# Patient Record
Sex: MALE | Race: WHITE | NOT HISPANIC OR LATINO | URBAN - METROPOLITAN AREA
[De-identification: names, ages, dates, MRNs, and addresses within clinical notes are randomized per-mention and may not be internally consistent; named-entity substitution may affect disease eponyms.]

---

## 2017-09-06 ENCOUNTER — INPATIENT (INPATIENT)
Facility: HOSPITAL | Age: 72
LOS: 5 days | Discharge: ANOTHER HOSPITAL | DRG: 280 | End: 2017-09-12
Attending: INTERNAL MEDICINE | Admitting: INTERNAL MEDICINE
Payer: MEDICARE

## 2017-09-06 VITALS
RESPIRATION RATE: 18 BRPM | TEMPERATURE: 98 F | HEIGHT: 68 IN | DIASTOLIC BLOOD PRESSURE: 100 MMHG | WEIGHT: 190.04 LBS | OXYGEN SATURATION: 98 % | HEART RATE: 74 BPM | SYSTOLIC BLOOD PRESSURE: 207 MMHG

## 2017-09-06 DIAGNOSIS — I21.4 NON-ST ELEVATION (NSTEMI) MYOCARDIAL INFARCTION: ICD-10-CM

## 2017-09-06 DIAGNOSIS — I16.1 HYPERTENSIVE EMERGENCY: ICD-10-CM

## 2017-09-06 DIAGNOSIS — Z29.9 ENCOUNTER FOR PROPHYLACTIC MEASURES, UNSPECIFIED: ICD-10-CM

## 2017-09-06 DIAGNOSIS — N20.0 CALCULUS OF KIDNEY: ICD-10-CM

## 2017-09-06 DIAGNOSIS — E11.22 TYPE 2 DIABETES MELLITUS WITH DIABETIC CHRONIC KIDNEY DISEASE: ICD-10-CM

## 2017-09-06 DIAGNOSIS — N17.9 ACUTE KIDNEY FAILURE, UNSPECIFIED: ICD-10-CM

## 2017-09-06 DIAGNOSIS — N12 TUBULO-INTERSTITIAL NEPHRITIS, NOT SPECIFIED AS ACUTE OR CHRONIC: ICD-10-CM

## 2017-09-06 LAB
ALBUMIN SERPL ELPH-MCNC: 3.7 G/DL — SIGNIFICANT CHANGE UP (ref 3.5–5)
ALP SERPL-CCNC: 83 U/L — SIGNIFICANT CHANGE UP (ref 40–120)
ALT FLD-CCNC: 19 U/L DA — SIGNIFICANT CHANGE UP (ref 10–60)
ANION GAP SERPL CALC-SCNC: 8 MMOL/L — SIGNIFICANT CHANGE UP (ref 5–17)
APPEARANCE UR: ABNORMAL
APTT BLD: 35.9 SEC — SIGNIFICANT CHANGE UP (ref 27.5–37.4)
AST SERPL-CCNC: 15 U/L — SIGNIFICANT CHANGE UP (ref 10–40)
BASOPHILS # BLD AUTO: 0 K/UL — SIGNIFICANT CHANGE UP (ref 0–0.2)
BASOPHILS NFR BLD AUTO: 0.3 % — SIGNIFICANT CHANGE UP (ref 0–2)
BILIRUB SERPL-MCNC: 0.4 MG/DL — SIGNIFICANT CHANGE UP (ref 0.2–1.2)
BILIRUB UR-MCNC: NEGATIVE — SIGNIFICANT CHANGE UP
BUN SERPL-MCNC: 35 MG/DL — HIGH (ref 7–18)
CALCIUM SERPL-MCNC: 10 MG/DL — SIGNIFICANT CHANGE UP (ref 8.4–10.5)
CHLORIDE SERPL-SCNC: 107 MMOL/L — SIGNIFICANT CHANGE UP (ref 96–108)
CO2 SERPL-SCNC: 25 MMOL/L — SIGNIFICANT CHANGE UP (ref 22–31)
COLOR SPEC: ABNORMAL
CREAT SERPL-MCNC: 3.55 MG/DL — HIGH (ref 0.5–1.3)
DIFF PNL FLD: ABNORMAL
EOSINOPHIL # BLD AUTO: 0 K/UL — SIGNIFICANT CHANGE UP (ref 0–0.5)
EOSINOPHIL NFR BLD AUTO: 0 % — SIGNIFICANT CHANGE UP (ref 0–6)
GLUCOSE SERPL-MCNC: 189 MG/DL — HIGH (ref 70–99)
GLUCOSE UR QL: 50 MG/DL
HCT VFR BLD CALC: 39.8 % — SIGNIFICANT CHANGE UP (ref 39–50)
HGB BLD-MCNC: 12.9 G/DL — LOW (ref 13–17)
INR BLD: 1.13 RATIO — SIGNIFICANT CHANGE UP (ref 0.88–1.16)
KETONES UR-MCNC: ABNORMAL
LEUKOCYTE ESTERASE UR-ACNC: ABNORMAL
LYMPHOCYTES # BLD AUTO: 1 K/UL — SIGNIFICANT CHANGE UP (ref 1–3.3)
LYMPHOCYTES # BLD AUTO: 7.7 % — LOW (ref 13–44)
MCHC RBC-ENTMCNC: 28.2 PG — SIGNIFICANT CHANGE UP (ref 27–34)
MCHC RBC-ENTMCNC: 32.5 GM/DL — SIGNIFICANT CHANGE UP (ref 32–36)
MCV RBC AUTO: 86.9 FL — SIGNIFICANT CHANGE UP (ref 80–100)
MONOCYTES # BLD AUTO: 0.4 K/UL — SIGNIFICANT CHANGE UP (ref 0–0.9)
MONOCYTES NFR BLD AUTO: 3.3 % — SIGNIFICANT CHANGE UP (ref 2–14)
NEUTROPHILS # BLD AUTO: 11.9 K/UL — HIGH (ref 1.8–7.4)
NEUTROPHILS NFR BLD AUTO: 88.7 % — HIGH (ref 43–77)
NITRITE UR-MCNC: POSITIVE
PH UR: 5 — SIGNIFICANT CHANGE UP (ref 5–8)
PLATELET # BLD AUTO: 229 K/UL — SIGNIFICANT CHANGE UP (ref 150–400)
POTASSIUM SERPL-MCNC: 5.1 MMOL/L — SIGNIFICANT CHANGE UP (ref 3.5–5.3)
POTASSIUM SERPL-SCNC: 5.1 MMOL/L — SIGNIFICANT CHANGE UP (ref 3.5–5.3)
PROT SERPL-MCNC: 8 G/DL — SIGNIFICANT CHANGE UP (ref 6–8.3)
PROT UR-MCNC: 500 MG/DL
PROTHROM AB SERPL-ACNC: 12.3 SEC — SIGNIFICANT CHANGE UP (ref 9.8–12.7)
RBC # BLD: 4.58 M/UL — SIGNIFICANT CHANGE UP (ref 4.2–5.8)
RBC # FLD: 13.1 % — SIGNIFICANT CHANGE UP (ref 10.3–14.5)
SODIUM SERPL-SCNC: 140 MMOL/L — SIGNIFICANT CHANGE UP (ref 135–145)
SP GR SPEC: 1.01 — SIGNIFICANT CHANGE UP (ref 1.01–1.02)
TROPONIN I SERPL-MCNC: 0.19 NG/ML — HIGH (ref 0–0.04)
UROBILINOGEN FLD QL: NEGATIVE — SIGNIFICANT CHANGE UP
WBC # BLD: 13.5 K/UL — HIGH (ref 3.8–10.5)
WBC # FLD AUTO: 13.5 K/UL — HIGH (ref 3.8–10.5)

## 2017-09-06 PROCEDURE — 71010: CPT | Mod: 26

## 2017-09-06 PROCEDURE — 99222 1ST HOSP IP/OBS MODERATE 55: CPT

## 2017-09-06 PROCEDURE — 74176 CT ABD & PELVIS W/O CONTRAST: CPT | Mod: 26

## 2017-09-06 PROCEDURE — 99285 EMERGENCY DEPT VISIT HI MDM: CPT

## 2017-09-06 PROCEDURE — 76770 US EXAM ABDO BACK WALL COMP: CPT | Mod: 26

## 2017-09-06 PROCEDURE — 76775 US EXAM ABDO BACK WALL LIM: CPT | Mod: 26

## 2017-09-06 RX ORDER — METOPROLOL TARTRATE 50 MG
50 TABLET ORAL
Qty: 0 | Refills: 0 | Status: DISCONTINUED | OUTPATIENT
Start: 2017-09-06 | End: 2017-09-07

## 2017-09-06 RX ORDER — ASPIRIN/CALCIUM CARB/MAGNESIUM 324 MG
81 TABLET ORAL DAILY
Qty: 0 | Refills: 0 | Status: DISCONTINUED | OUTPATIENT
Start: 2017-09-06 | End: 2017-09-12

## 2017-09-06 RX ORDER — LABETALOL HCL 100 MG
10 TABLET ORAL ONCE
Qty: 0 | Refills: 0 | Status: COMPLETED | OUTPATIENT
Start: 2017-09-06 | End: 2017-09-06

## 2017-09-06 RX ORDER — DEXTROSE 50 % IN WATER 50 %
12.5 SYRINGE (ML) INTRAVENOUS ONCE
Qty: 0 | Refills: 0 | Status: DISCONTINUED | OUTPATIENT
Start: 2017-09-06 | End: 2017-09-07

## 2017-09-06 RX ORDER — HYDROMORPHONE HYDROCHLORIDE 2 MG/ML
0.5 INJECTION INTRAMUSCULAR; INTRAVENOUS; SUBCUTANEOUS
Qty: 0 | Refills: 0 | Status: DISCONTINUED | OUTPATIENT
Start: 2017-09-06 | End: 2017-09-10

## 2017-09-06 RX ORDER — ASPIRIN/CALCIUM CARB/MAGNESIUM 324 MG
325 TABLET ORAL ONCE
Qty: 0 | Refills: 0 | Status: DISCONTINUED | OUTPATIENT
Start: 2017-09-06 | End: 2017-09-06

## 2017-09-06 RX ORDER — CEFTRIAXONE 500 MG/1
1 INJECTION, POWDER, FOR SOLUTION INTRAMUSCULAR; INTRAVENOUS EVERY 24 HOURS
Qty: 0 | Refills: 0 | Status: DISCONTINUED | OUTPATIENT
Start: 2017-09-06 | End: 2017-09-12

## 2017-09-06 RX ORDER — HEPARIN SODIUM 5000 [USP'U]/ML
INJECTION INTRAVENOUS; SUBCUTANEOUS
Qty: 25000 | Refills: 0 | Status: DISCONTINUED | OUTPATIENT
Start: 2017-09-06 | End: 2017-09-07

## 2017-09-06 RX ORDER — HEPARIN SODIUM 5000 [USP'U]/ML
4000 INJECTION INTRAVENOUS; SUBCUTANEOUS EVERY 6 HOURS
Qty: 0 | Refills: 0 | Status: DISCONTINUED | OUTPATIENT
Start: 2017-09-06 | End: 2017-09-06

## 2017-09-06 RX ORDER — ACETAMINOPHEN WITH CODEINE 300MG-30MG
1 TABLET ORAL EVERY 4 HOURS
Qty: 0 | Refills: 0 | Status: DISCONTINUED | OUTPATIENT
Start: 2017-09-06 | End: 2017-09-06

## 2017-09-06 RX ORDER — HEPARIN SODIUM 5000 [USP'U]/ML
4000 INJECTION INTRAVENOUS; SUBCUTANEOUS ONCE
Qty: 0 | Refills: 0 | Status: COMPLETED | OUTPATIENT
Start: 2017-09-06 | End: 2017-09-06

## 2017-09-06 RX ORDER — PANTOPRAZOLE SODIUM 20 MG/1
40 TABLET, DELAYED RELEASE ORAL
Qty: 0 | Refills: 0 | Status: DISCONTINUED | OUTPATIENT
Start: 2017-09-06 | End: 2017-09-11

## 2017-09-06 RX ORDER — ATORVASTATIN CALCIUM 80 MG/1
80 TABLET, FILM COATED ORAL AT BEDTIME
Qty: 0 | Refills: 0 | Status: DISCONTINUED | OUTPATIENT
Start: 2017-09-06 | End: 2017-09-12

## 2017-09-06 RX ORDER — SODIUM CHLORIDE 9 MG/ML
1000 INJECTION INTRAMUSCULAR; INTRAVENOUS; SUBCUTANEOUS ONCE
Qty: 0 | Refills: 0 | Status: COMPLETED | OUTPATIENT
Start: 2017-09-06 | End: 2017-09-06

## 2017-09-06 RX ORDER — ONDANSETRON 8 MG/1
4 TABLET, FILM COATED ORAL ONCE
Qty: 0 | Refills: 0 | Status: COMPLETED | OUTPATIENT
Start: 2017-09-06 | End: 2017-09-06

## 2017-09-06 RX ORDER — NITROGLYCERIN 6.5 MG
10 CAPSULE, EXTENDED RELEASE ORAL
Qty: 50 | Refills: 0 | Status: DISCONTINUED | OUTPATIENT
Start: 2017-09-06 | End: 2017-09-06

## 2017-09-06 RX ORDER — CLOPIDOGREL BISULFATE 75 MG/1
75 TABLET, FILM COATED ORAL DAILY
Qty: 0 | Refills: 0 | Status: DISCONTINUED | OUTPATIENT
Start: 2017-09-06 | End: 2017-09-10

## 2017-09-06 RX ORDER — ACETAMINOPHEN WITH CODEINE 300MG-30MG
1 TABLET ORAL ONCE
Qty: 0 | Refills: 0 | Status: DISCONTINUED | OUTPATIENT
Start: 2017-09-06 | End: 2017-09-06

## 2017-09-06 RX ORDER — METOPROLOL TARTRATE 50 MG
5 TABLET ORAL ONCE
Qty: 0 | Refills: 0 | Status: DISCONTINUED | OUTPATIENT
Start: 2017-09-06 | End: 2017-09-06

## 2017-09-06 RX ORDER — INSULIN LISPRO 100/ML
VIAL (ML) SUBCUTANEOUS
Qty: 0 | Refills: 0 | Status: DISCONTINUED | OUTPATIENT
Start: 2017-09-06 | End: 2017-09-10

## 2017-09-06 RX ORDER — GLUCAGON INJECTION, SOLUTION 0.5 MG/.1ML
1 INJECTION, SOLUTION SUBCUTANEOUS ONCE
Qty: 0 | Refills: 0 | Status: DISCONTINUED | OUTPATIENT
Start: 2017-09-06 | End: 2017-09-07

## 2017-09-06 RX ORDER — DEXTROSE 50 % IN WATER 50 %
25 SYRINGE (ML) INTRAVENOUS ONCE
Qty: 0 | Refills: 0 | Status: DISCONTINUED | OUTPATIENT
Start: 2017-09-06 | End: 2017-09-07

## 2017-09-06 RX ORDER — CLOPIDOGREL BISULFATE 75 MG/1
300 TABLET, FILM COATED ORAL ONCE
Qty: 0 | Refills: 0 | Status: COMPLETED | OUTPATIENT
Start: 2017-09-06 | End: 2017-09-06

## 2017-09-06 RX ORDER — BUDESONIDE AND FORMOTEROL FUMARATE DIHYDRATE 160; 4.5 UG/1; UG/1
2 AEROSOL RESPIRATORY (INHALATION)
Qty: 0 | Refills: 0 | Status: DISCONTINUED | OUTPATIENT
Start: 2017-09-06 | End: 2017-09-12

## 2017-09-06 RX ORDER — NITROGLYCERIN 6.5 MG
0.4 CAPSULE, EXTENDED RELEASE ORAL ONCE
Qty: 0 | Refills: 0 | Status: DISCONTINUED | OUTPATIENT
Start: 2017-09-06 | End: 2017-09-06

## 2017-09-06 RX ORDER — DEXTROSE 50 % IN WATER 50 %
1 SYRINGE (ML) INTRAVENOUS ONCE
Qty: 0 | Refills: 0 | Status: DISCONTINUED | OUTPATIENT
Start: 2017-09-06 | End: 2017-09-07

## 2017-09-06 RX ORDER — NIFEDIPINE 30 MG
30 TABLET, EXTENDED RELEASE 24 HR ORAL DAILY
Qty: 0 | Refills: 0 | Status: DISCONTINUED | OUTPATIENT
Start: 2017-09-07 | End: 2017-09-08

## 2017-09-06 RX ORDER — TAMSULOSIN HYDROCHLORIDE 0.4 MG/1
0.4 CAPSULE ORAL AT BEDTIME
Qty: 0 | Refills: 0 | Status: DISCONTINUED | OUTPATIENT
Start: 2017-09-06 | End: 2017-09-06

## 2017-09-06 RX ORDER — ATORVASTATIN CALCIUM 80 MG/1
40 TABLET, FILM COATED ORAL AT BEDTIME
Qty: 0 | Refills: 0 | Status: DISCONTINUED | OUTPATIENT
Start: 2017-09-06 | End: 2017-09-06

## 2017-09-06 RX ORDER — SODIUM CHLORIDE 9 MG/ML
1000 INJECTION, SOLUTION INTRAVENOUS
Qty: 0 | Refills: 0 | Status: DISCONTINUED | OUTPATIENT
Start: 2017-09-06 | End: 2017-09-07

## 2017-09-06 RX ORDER — TAMSULOSIN HYDROCHLORIDE 0.4 MG/1
0.4 CAPSULE ORAL AT BEDTIME
Qty: 0 | Refills: 0 | Status: DISCONTINUED | OUTPATIENT
Start: 2017-09-06 | End: 2017-09-12

## 2017-09-06 RX ORDER — METOPROLOL TARTRATE 50 MG
25 TABLET ORAL
Qty: 0 | Refills: 0 | Status: DISCONTINUED | OUTPATIENT
Start: 2017-09-06 | End: 2017-09-06

## 2017-09-06 RX ORDER — ASPIRIN/CALCIUM CARB/MAGNESIUM 324 MG
325 TABLET ORAL ONCE
Qty: 0 | Refills: 0 | Status: COMPLETED | OUTPATIENT
Start: 2017-09-06 | End: 2017-09-06

## 2017-09-06 RX ORDER — NITROGLYCERIN 6.5 MG
0.4 CAPSULE, EXTENDED RELEASE ORAL ONCE
Qty: 0 | Refills: 0 | Status: COMPLETED | OUTPATIENT
Start: 2017-09-06 | End: 2017-09-06

## 2017-09-06 RX ORDER — RANOLAZINE 500 MG/1
1000 TABLET, FILM COATED, EXTENDED RELEASE ORAL
Qty: 0 | Refills: 0 | Status: DISCONTINUED | OUTPATIENT
Start: 2017-09-06 | End: 2017-09-06

## 2017-09-06 RX ADMIN — CLOPIDOGREL BISULFATE 300 MILLIGRAM(S): 75 TABLET, FILM COATED ORAL at 18:24

## 2017-09-06 RX ADMIN — HEPARIN SODIUM 4000 UNIT(S): 5000 INJECTION INTRAVENOUS; SUBCUTANEOUS at 18:34

## 2017-09-06 RX ADMIN — Medication 1 TABLET(S): at 16:52

## 2017-09-06 RX ADMIN — CEFTRIAXONE 100 GRAM(S): 500 INJECTION, POWDER, FOR SOLUTION INTRAMUSCULAR; INTRAVENOUS at 20:38

## 2017-09-06 RX ADMIN — Medication 325 MILLIGRAM(S): at 21:07

## 2017-09-06 RX ADMIN — Medication 1 TABLET(S): at 17:10

## 2017-09-06 RX ADMIN — Medication 10 MILLIGRAM(S): at 19:15

## 2017-09-06 RX ADMIN — ONDANSETRON 4 MILLIGRAM(S): 8 TABLET, FILM COATED ORAL at 16:52

## 2017-09-06 RX ADMIN — SODIUM CHLORIDE 2000 MILLILITER(S): 9 INJECTION INTRAMUSCULAR; INTRAVENOUS; SUBCUTANEOUS at 16:53

## 2017-09-06 RX ADMIN — TAMSULOSIN HYDROCHLORIDE 0.4 MILLIGRAM(S): 0.4 CAPSULE ORAL at 22:33

## 2017-09-06 RX ADMIN — Medication 50 MILLIGRAM(S): at 20:38

## 2017-09-06 RX ADMIN — BUDESONIDE AND FORMOTEROL FUMARATE DIHYDRATE 2 PUFF(S): 160; 4.5 AEROSOL RESPIRATORY (INHALATION) at 23:03

## 2017-09-06 RX ADMIN — HEPARIN SODIUM 1000 UNIT(S)/HR: 5000 INJECTION INTRAVENOUS; SUBCUTANEOUS at 18:35

## 2017-09-06 RX ADMIN — ATORVASTATIN CALCIUM 80 MILLIGRAM(S): 80 TABLET, FILM COATED ORAL at 22:34

## 2017-09-06 NOTE — H&P ADULT - PROBLEM SELECTOR PLAN 3
LIN on CKD, last creatinine 2.1 in 2016  CT abdomen/pelvis: right hydronephrosis, right ureteral stone; bilateral renal calculi, left renal atrophy.  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  patient refused james  - check urine Na, creatinine, calculate FENA  - urology consult tonight LIN on CKD, last creatinine 2.1 in 2016  CT abdomen/pelvis: right hydronephrosis, right ureteral stone; bilateral renal calculi, left renal atrophy.  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  patient refused james  - check urine Na, creatinine, calculate FENA  - urology consult tonight - Dr Irvin consulted

## 2017-09-06 NOTE — ED PROVIDER NOTE - OBJECTIVE STATEMENT
71 y/o M pt with a significant PMHx of coronary atherosclerosis, lumbar radiculopathy, HTN, DM, HLD, Bipolar 1 disorder, cholecystitis, hydronephrosis, dizziness, cardiac arrhythmia, fainting, COPD, CKD, acute renal failure and anemia presents to the ED c/o intermittent R flank pain becoming severe today associated with nausea and NBNB vomiting. Pt is Austrian speaking with daughter as ; pt refuses translation phone services. 73 y/o M pt with a significant PMHx of coronary atherosclerosis, lumbar radiculopathy, HTN, DM, HLD, Bipolar 1 disorder, cholecystitis, hydronephrosis, dizziness, cardiac arrhythmia, fainting, COPD, CKD, acute renal failure and anemia presents to the ED c/o intermittent R flank pain becoming severe today associated with nausea and NBNB vomiting. Pt is Finnish speaking with daughter as ; pt refuses translation phone services. Pt was sent in by his pmd for uncontrollable pain despite taking Vimovo and inability to tolerate PO. Pt notes he has had multiple CT and US demonstrating numerous kidney stones. Pt is intermittently adherent with Flomax. Pt also complains of subjective fever x today. Pt refused IV medication at the ED and requests tylenol number 3. Pt also refuses urinary catheter but will agree to IV line, labs and an attempt to control pain and nausea. Pt denies diarrhea or any other complaints. NKDA. 71 y/o M pt with a significant PMHx of coronary atherosclerosis, lumbar radiculopathy, HTN, DM, HLD, Bipolar 1 disorder, cholecystitis, hydronephrosis, dizziness, cardiac arrhythmia, fainting, COPD, CKD, acute renal failure and anemia presents to the ED c/o intermittent R flank pain becoming severe today associated with nausea and NBNB vomiting. Pt is [place] speaking with daughter as ; pt refuses translation phone services. Pt was sent in by his pPMD for uncontrollable pain despite taking Vimovo and inability to tolerate PO. Pt notes he has had multiple CT and US demonstrating numerous kidney.  Requests Tylenol #3 for pain, refuses IV Tylenol, Percocet, or Morphine. Pt also refuses urinary catheter but will agree to IV line, labs and an attempt to control pain and nausea. Pt denies diarrhea or any other complaints. NKDA.

## 2017-09-06 NOTE — H&P ADULT - ASSESSMENT
71yo male, Nauruan speaking, wife is current patient in ICU, daughter at bedside to [facility], [facility] CAD s/p 12-14 stents (placed [phone]), DM, HTN, HLD, CVA (4 yrs ago, residual left sided facial weakness), lumbar radiculopathy with herniated disc, COPD (never smoker, passive smoke exposure), CKD (last creatinine 2 months ago was 1.2), bipolar disorder (no meds, sees psychiatrist), chronic kidney stones (has had ureteral stent in past) p/w 2 days intractable right flank pain radiating to groin, chills, vomiting 2/2 kidney stones/UTI, rule out pyelonephritis/obstruction.  Patient with NSTEMI with unstable angina and TWI lateral leads

## 2017-09-06 NOTE — H&P ADULT - PROBLEM SELECTOR PLAN 4
fever, chills, vomiting, perinephric stranding on CT scan, + R CVAT  UA positive nitrite/LE/WBC  - ceftriaxone  - follow up UCx and Bcx

## 2017-09-06 NOTE — H&P ADULT - HISTORY OF PRESENT ILLNESS
73yo male, Albanian speaking, wife is current patient in ICU, daughter at bedside to [facility], [facility] CAD s/p 12-14 stents (placed [id number]), DM, HTN, HLD, CVA (4 yrs ago, residual left sided facial weakness), lumbar radiculopathy with herniated disc, COPD (never smoker, passive smoke exposure), CKD (last creatinine 2 months ago was 1.2), bipolar disorder (no meds, sees psychiatrist), chronic kidney stones (has had ureteral stent in past) p/w 2 days intractable right flank pain radiating to groin, chills, vomiting.  BIBA.  In ED, endorsed 10/10 substernal chest tightness with radiation to jaw.  ROS + chest pain at rest, CHAMPION after 2 steps, lower abdominal pain.  Denies fever, orthopnea, leg swelling, sore throat, diarrhea, dysuria, hematuria.  Last cardiac cath 2months ago, showed stent migration and stenosis, but patient refused stent.  Patient refusing james    In ED,  patient comfortable in stretcher  tmax 99 P 74 /100 RR 18 02 sat 98% RA  /77 after labetolol 10 IV and lopressor 5mg IV  s/p , plavix 300, NTG 0.4 SL  UA + WBC and RBC  Cr 3.55, renal ultrasound

## 2017-09-06 NOTE — ED ADULT TRIAGE NOTE - CHIEF COMPLAINT QUOTE
via ambulance ambulatory w/ daughter sent from pmd office for Rt flank pains this morning w/ nausea and vomiting

## 2017-09-06 NOTE — ED PROVIDER NOTE - CONSTITUTIONAL, MLM
normal... Well appearing, well nourished, awake, alert, oriented to person, place, time/situation and in no acute distress.

## 2017-09-06 NOTE — ED ADULT NURSE NOTE - OBJECTIVE STATEMENT
Pt co having right flank pain, HX kidney stones. co nausea and vomiting today. denies diarrhea and trouble urination

## 2017-09-06 NOTE — ED PROVIDER NOTE - MUSCULOSKELETAL, MLM
Spine appears normal, range of motion is not limited, no muscle or joint tenderness. R CVA tenderness.

## 2017-09-06 NOTE — ED PROVIDER NOTE - NS_ ATTENDINGSCRIBEDETAILS _ED_A_ED_FT
The scribe's documentation has been prepared under my direction and personally reviewed by me, Saskia Win MD, in its entirety. I confirm that the note above accurately reflects all work, treatment, procedures, and medical decision making performed by me.

## 2017-09-06 NOTE — ED PROVIDER NOTE - MEDICAL DECISION MAKING DETAILS
71 y/o M pt presents with R flank pain likely 2/2 recurrent kidney stones. Will give IV fluids, Zofran, labs, tylenol number 3 and consider Toradol when creatine is back, US and reassess. 71 y/o M pt presents with R flank pain likely 2/2 recurrent kidney stones. Will give IV fluids, Zofran, labs, tylenol number 3 and consider Toradol when creatine is back, US and reassess.    ADDENDUM by Kenia Mcneil MD: Please see  clinical event note for further clinical course.

## 2017-09-06 NOTE — H&P ADULT - PROBLEM SELECTOR PLAN 1
patient presented with unstable patient presented with unstable angina in ED  TWI precordial and lateral leads, elevated troponin  currently, chest pain free after ASA, plavix loading, sl NTG, heparin gtt  no beds in CCU and no cath tonight given naman on ckd  - trend troponins  - TTE in AM  - ASA, statin, BB, heparin gtt  - cardiology consult in AM

## 2017-09-06 NOTE — H&P ADULT - ATTENDING COMMENTS
72 male with hx of CAD s/p prior PCI, CKD, HTN, nephrolithiasis, ureteral stent, CVA, COPD, presented to ED complaining of right flank pain along with chest pain.  Found to have kidney stones, mild hydronephrosis, LIN, UTI.  Also with NSTEMI, EKG abnormalities (inverted T-waves in precordial leads).  Transfer center contacted however they currently have no CCU beds, and they do not want to cath given the LIN.    Will admit to ICU, start ASA, plavix, heparin drip, nitro drip, abx for UTI, metoprolol, statin.  Will check echo, troponins, obtain cardiology consult in AM.  CT abdomen/pelvis ordered.

## 2017-09-06 NOTE — ED PROVIDER NOTE - PMH
Acute renal failure    Anemia    Bipolar 1 disorder    Cardiac arrhythmia    Cholecystitis    CKD (chronic kidney disease)    COPD (chronic obstructive pulmonary disease)    Coronary atherosclerosis    Dizziness    DM (diabetes mellitus)    Fainting    HLD (hyperlipidemia)    HTN (hypertension)    Hydronephrosis    Left heart failure    Lumbar radiculopathy    Reflux esophagitis

## 2017-09-06 NOTE — ED PROVIDER NOTE - CARE PLAN
Principal Discharge DX:	NSTEMI (non-ST elevated myocardial infarction) Principal Discharge DX:	NSTEMI (non-ST elevated myocardial infarction)  Secondary Diagnosis:	Renal stone

## 2017-09-06 NOTE — CONSULT NOTE ADULT - SUBJECTIVE AND OBJECTIVE BOX
HPI:  73yo male, Tanzanian speaking, wife is current patient in ICU, daughter at bedside to [facility], [facility] CAD s/p 12-14 stents (placed [phone]), DM, HTN, HLD, CVA (4 yrs ago, residual left sided facial weakness), lumbar radiculopathy with herniated disc, COPD (never smoker, passive smoke exposure), CKD (last creatinine 2 months ago was 1.2), bipolar disorder (no meds, sees psychiatrist), chronic kidney stones (has had ureteral stent in past) p/w 2 days intractable right flank pain radiating to groin, chills, vomiting.  BIBA.  In ED, endorsed 10/10 substernal chest tightness with radiation to jaw.  ROS + chest pain at rest, CHAMPION after 2 steps, lower abdominal pain.  Denies fever, orthopnea, leg swelling, sore throat, diarrhea, dysuria, hematuria.  Last cardiac cath 2months ago, showed stent migration and stenosis, but patient refused stent.  Patient refusing james    In ED,  patient comfortable in stretcher  tmax 99 P 74 /100 RR 18 02 sat 98% RA  /77 after labetolol 10 IV and lopressor 5mg IV  s/p , plavix 300, NTG 0.4 SL  UA + WBC and RBC  Cr 3.55, renal ultrasound (06 Sep 2017 20:37)      PAST MEDICAL & SURGICAL HISTORY:      Vital Signs Last 24 Hrs  T(C): 37.6 (06 Sep 2017 20:59), Max: 37.6 (06 Sep 2017 20:59)  T(F): 99.6 (06 Sep 2017 20:59), Max: 99.6 (06 Sep 2017 20:59)  HR: 83 (06 Sep 2017 21:45) (74 - 99)  BP: 160/76 (06 Sep 2017 21:45) (159/78 - 207/100)  BP(mean): 97 (06 Sep 2017 21:45) (97 - 104)  RR: 17 (06 Sep 2017 21:45) (17 - 25)  SpO2: 99% (06 Sep 2017 21:45) (97% - 99%)                          12.9   13.5  )-----------( 229      ( 06 Sep 2017 16:27 )             39.8     09-06    140  |  107  |  35<H>  ----------------------------<  189<H>  5.1   |  25  |  3.55<H>    Ca    10.0      06 Sep 2017 16:27    TPro  8.0  /  Alb  3.7  /  TBili  0.4  /  DBili  x   /  AST  15  /  ALT  19  /  AlkPhos  83  09-06    PT/INR - ( 06 Sep 2017 16:27 )   PT: 12.3 sec;   INR: 1.13 ratio         PTT - ( 06 Sep 2017 16:27 )  PTT:35.9 sec      < from: US Renal (09.06.17 @ 16:50) >  Right kidney: Normal size, measures 12.0 x 6.1 x 6.2 cm. There is a 1.0   cm shadowing calculus in the lower pole. Mild hydronephrosis. No   perinephric fluid collection.    Left kidney: Normal size, measures 10.2 x 4.3 x 4.8 cm. Multiple   shadowing calculi measuring up to 1.2 cm. No hydronephrosis or   perinephric fluid collection. There is a cyst in the upper pole measuring   1.6 cm.    Urinary bladder contains a 1.9 cm shadowing calculus. Urinary bladder is   incompletely distended, which limits evaluation.    < end of copied text >    PHYSICAL EXAM  resting comfortably  Abdomen: soft, mild suprapubic tenderness    ASSESSMENT/ PLAN:  71 y/o male admitted to ICU with Acute on CRF, NSTEMI, renal calculi with mild R hydro  on hep gtt per ICU  pt/family refusing james  iv abx per ICU; trend wbc, chem  needs med/cardiac clearance; w/u  urology will follow  case discussed with Dr Irvin

## 2017-09-06 NOTE — ED PROVIDER NOTE - PROGRESS NOTE DETAILS
NIC HOWARD  ATTENDING, MD: During sign out, as per RN, patient c/o chest pain and EKG was obtained.   EKG-signed and handed to Dr. Mcneil at sign out.

## 2017-09-07 DIAGNOSIS — N20.0 CALCULUS OF KIDNEY: ICD-10-CM

## 2017-09-07 DIAGNOSIS — N18.9 CHRONIC KIDNEY DISEASE, UNSPECIFIED: ICD-10-CM

## 2017-09-07 DIAGNOSIS — E87.2 ACIDOSIS: ICD-10-CM

## 2017-09-07 DIAGNOSIS — I15.1 HYPERTENSION SECONDARY TO OTHER RENAL DISORDERS: ICD-10-CM

## 2017-09-07 LAB
ALBUMIN SERPL ELPH-MCNC: 3.1 G/DL — LOW (ref 3.5–5)
ALP SERPL-CCNC: 69 U/L — SIGNIFICANT CHANGE UP (ref 40–120)
ALT FLD-CCNC: 13 U/L DA — SIGNIFICANT CHANGE UP (ref 10–60)
ANION GAP SERPL CALC-SCNC: 10 MMOL/L — SIGNIFICANT CHANGE UP (ref 5–17)
ANION GAP SERPL CALC-SCNC: 11 MMOL/L — SIGNIFICANT CHANGE UP (ref 5–17)
APPEARANCE UR: CLEAR — SIGNIFICANT CHANGE UP
APTT BLD: 64.8 SEC — HIGH (ref 27.5–37.4)
APTT BLD: 68.8 SEC — HIGH (ref 27.5–37.4)
APTT BLD: 77.8 SEC — HIGH (ref 27.5–37.4)
APTT BLD: 86.5 SEC — HIGH (ref 27.5–37.4)
AST SERPL-CCNC: 19 U/L — SIGNIFICANT CHANGE UP (ref 10–40)
BASE EXCESS BLDA CALC-SCNC: -3.6 MMOL/L — LOW (ref -2–2)
BASOPHILS # BLD AUTO: 0.1 K/UL — SIGNIFICANT CHANGE UP (ref 0–0.2)
BASOPHILS NFR BLD AUTO: 0.7 % — SIGNIFICANT CHANGE UP (ref 0–2)
BILIRUB SERPL-MCNC: 0.6 MG/DL — SIGNIFICANT CHANGE UP (ref 0.2–1.2)
BILIRUB UR-MCNC: NEGATIVE — SIGNIFICANT CHANGE UP
BLOOD GAS COMMENTS ARTERIAL: SIGNIFICANT CHANGE UP
BUN SERPL-MCNC: 42 MG/DL — HIGH (ref 7–18)
BUN SERPL-MCNC: 43 MG/DL — HIGH (ref 7–18)
CALCIUM SERPL-MCNC: 8.6 MG/DL — SIGNIFICANT CHANGE UP (ref 8.4–10.5)
CALCIUM SERPL-MCNC: 9.3 MG/DL — SIGNIFICANT CHANGE UP (ref 8.4–10.5)
CHLORIDE SERPL-SCNC: 108 MMOL/L — SIGNIFICANT CHANGE UP (ref 96–108)
CHLORIDE SERPL-SCNC: 108 MMOL/L — SIGNIFICANT CHANGE UP (ref 96–108)
CHOLEST SERPL-MCNC: 171 MG/DL — SIGNIFICANT CHANGE UP (ref 10–199)
CK MB BLD-MCNC: 2.5 % — SIGNIFICANT CHANGE UP (ref 0–3.5)
CK MB CFR SERPL CALC: 4.3 NG/ML — HIGH (ref 0–3.6)
CK SERPL-CCNC: 175 U/L — SIGNIFICANT CHANGE UP (ref 35–232)
CO2 SERPL-SCNC: 21 MMOL/L — LOW (ref 22–31)
CO2 SERPL-SCNC: 22 MMOL/L — SIGNIFICANT CHANGE UP (ref 22–31)
COLOR SPEC: YELLOW — SIGNIFICANT CHANGE UP
CREAT SERPL-MCNC: 3.92 MG/DL — HIGH (ref 0.5–1.3)
CREAT SERPL-MCNC: 3.93 MG/DL — HIGH (ref 0.5–1.3)
CULTURE RESULTS: NO GROWTH — SIGNIFICANT CHANGE UP
DIFF PNL FLD: ABNORMAL
EOSINOPHIL # BLD AUTO: 0 K/UL — SIGNIFICANT CHANGE UP (ref 0–0.5)
EOSINOPHIL NFR BLD AUTO: 0.4 % — SIGNIFICANT CHANGE UP (ref 0–6)
GLUCOSE SERPL-MCNC: 122 MG/DL — HIGH (ref 70–99)
GLUCOSE SERPL-MCNC: 145 MG/DL — HIGH (ref 70–99)
GLUCOSE UR QL: NEGATIVE — SIGNIFICANT CHANGE UP
HBA1C BLD-MCNC: 6.5 % — HIGH (ref 4–5.6)
HCO3 BLDA-SCNC: 20 MMOL/L — LOW (ref 23–27)
HCT VFR BLD CALC: 34.6 % — LOW (ref 39–50)
HCT VFR BLD CALC: 36.3 % — LOW (ref 39–50)
HCT VFR BLD CALC: 39.9 % — SIGNIFICANT CHANGE UP (ref 39–50)
HDLC SERPL-MCNC: 36 MG/DL — LOW (ref 40–125)
HGB BLD-MCNC: 11.3 G/DL — LOW (ref 13–17)
HGB BLD-MCNC: 11.7 G/DL — LOW (ref 13–17)
HGB BLD-MCNC: 12.7 G/DL — LOW (ref 13–17)
HOROWITZ INDEX BLDA+IHG-RTO: 100 — SIGNIFICANT CHANGE UP
INR BLD: 1.15 RATIO — SIGNIFICANT CHANGE UP (ref 0.88–1.16)
IRON SATN MFR SERPL: 20 % — SIGNIFICANT CHANGE UP (ref 20–55)
IRON SATN MFR SERPL: 50 UG/DL — LOW (ref 65–170)
KETONES UR-MCNC: ABNORMAL
LEUKOCYTE ESTERASE UR-ACNC: ABNORMAL
LIPID PNL WITH DIRECT LDL SERPL: 104 MG/DL — SIGNIFICANT CHANGE UP
LYMPHOCYTES # BLD AUTO: 1.8 K/UL — SIGNIFICANT CHANGE UP (ref 1–3.3)
LYMPHOCYTES # BLD AUTO: 17.5 % — SIGNIFICANT CHANGE UP (ref 13–44)
MAGNESIUM SERPL-MCNC: 2.1 MG/DL — SIGNIFICANT CHANGE UP (ref 1.6–2.6)
MCHC RBC-ENTMCNC: 27.6 PG — SIGNIFICANT CHANGE UP (ref 27–34)
MCHC RBC-ENTMCNC: 27.7 PG — SIGNIFICANT CHANGE UP (ref 27–34)
MCHC RBC-ENTMCNC: 27.9 PG — SIGNIFICANT CHANGE UP (ref 27–34)
MCHC RBC-ENTMCNC: 31.9 GM/DL — LOW (ref 32–36)
MCHC RBC-ENTMCNC: 32.2 GM/DL — SIGNIFICANT CHANGE UP (ref 32–36)
MCHC RBC-ENTMCNC: 32.5 GM/DL — SIGNIFICANT CHANGE UP (ref 32–36)
MCV RBC AUTO: 85.1 FL — SIGNIFICANT CHANGE UP (ref 80–100)
MCV RBC AUTO: 85.6 FL — SIGNIFICANT CHANGE UP (ref 80–100)
MCV RBC AUTO: 87.4 FL — SIGNIFICANT CHANGE UP (ref 80–100)
MONOCYTES # BLD AUTO: 0.8 K/UL — SIGNIFICANT CHANGE UP (ref 0–0.9)
MONOCYTES NFR BLD AUTO: 8.2 % — SIGNIFICANT CHANGE UP (ref 2–14)
NEUTROPHILS # BLD AUTO: 7.5 K/UL — HIGH (ref 1.8–7.4)
NEUTROPHILS NFR BLD AUTO: 73.2 % — SIGNIFICANT CHANGE UP (ref 43–77)
NITRITE UR-MCNC: NEGATIVE — SIGNIFICANT CHANGE UP
OSMOLALITY UR: 357 MOS/KG — SIGNIFICANT CHANGE UP (ref 50–1200)
PCO2 BLDA: 36 MMHG — SIGNIFICANT CHANGE UP (ref 32–46)
PH BLDA: 7.38 — SIGNIFICANT CHANGE UP (ref 7.35–7.45)
PH UR: 5 — SIGNIFICANT CHANGE UP (ref 5–8)
PHOSPHATE SERPL-MCNC: 4.6 MG/DL — HIGH (ref 2.5–4.5)
PLATELET # BLD AUTO: 194 K/UL — SIGNIFICANT CHANGE UP (ref 150–400)
PLATELET # BLD AUTO: 195 K/UL — SIGNIFICANT CHANGE UP (ref 150–400)
PLATELET # BLD AUTO: 210 K/UL — SIGNIFICANT CHANGE UP (ref 150–400)
PO2 BLDA: 70 MMHG — LOW (ref 74–108)
POTASSIUM SERPL-MCNC: 4.1 MMOL/L — SIGNIFICANT CHANGE UP (ref 3.5–5.3)
POTASSIUM SERPL-MCNC: 4.4 MMOL/L — SIGNIFICANT CHANGE UP (ref 3.5–5.3)
POTASSIUM SERPL-SCNC: 4.1 MMOL/L — SIGNIFICANT CHANGE UP (ref 3.5–5.3)
POTASSIUM SERPL-SCNC: 4.4 MMOL/L — SIGNIFICANT CHANGE UP (ref 3.5–5.3)
PROT ?TM UR-MCNC: 50 MG/DL — HIGH (ref 0–12)
PROT SERPL-MCNC: 6.7 G/DL — SIGNIFICANT CHANGE UP (ref 6–8.3)
PROT UR-MCNC: 100
PROTHROM AB SERPL-ACNC: 12.6 SEC — SIGNIFICANT CHANGE UP (ref 9.8–12.7)
RBC # BLD: 4 M/UL — LOW (ref 4.2–5.8)
RBC # BLD: 4.06 M/UL — LOW (ref 4.2–5.8)
RBC # BLD: 4.24 M/UL — SIGNIFICANT CHANGE UP (ref 4.2–5.8)
RBC # BLD: 4.57 M/UL — SIGNIFICANT CHANGE UP (ref 4.2–5.8)
RBC # FLD: 12.9 % — SIGNIFICANT CHANGE UP (ref 10.3–14.5)
RBC # FLD: 13 % — SIGNIFICANT CHANGE UP (ref 10.3–14.5)
RBC # FLD: 13.1 % — SIGNIFICANT CHANGE UP (ref 10.3–14.5)
RETICS #: 36.4 K/UL — SIGNIFICANT CHANGE UP (ref 25–125)
RETICS/RBC NFR: 0.9 % — SIGNIFICANT CHANGE UP (ref 0.5–2.5)
SAO2 % BLDA: 94 % — SIGNIFICANT CHANGE UP (ref 92–96)
SODIUM SERPL-SCNC: 140 MMOL/L — SIGNIFICANT CHANGE UP (ref 135–145)
SODIUM SERPL-SCNC: 140 MMOL/L — SIGNIFICANT CHANGE UP (ref 135–145)
SODIUM UR-SCNC: 47 MMOL/L — SIGNIFICANT CHANGE UP (ref 40–220)
SP GR SPEC: 1.02 — SIGNIFICANT CHANGE UP (ref 1.01–1.02)
SPECIMEN SOURCE: SIGNIFICANT CHANGE UP
TIBC SERPL-MCNC: 249 UG/DL — LOW (ref 250–450)
TOTAL CHOLESTEROL/HDL RATIO MEASUREMENT: 4.8 RATIO — SIGNIFICANT CHANGE UP (ref 3.4–9.6)
TRIGL SERPL-MCNC: 153 MG/DL — HIGH (ref 10–149)
TROPONIN I SERPL-MCNC: 1.18 NG/ML — HIGH (ref 0–0.04)
TROPONIN I SERPL-MCNC: 1.51 NG/ML — HIGH (ref 0–0.04)
TROPONIN I SERPL-MCNC: 1.89 NG/ML — HIGH (ref 0–0.04)
UIBC SERPL-MCNC: 199 UG/DL — SIGNIFICANT CHANGE UP (ref 110–370)
UROBILINOGEN FLD QL: NEGATIVE — SIGNIFICANT CHANGE UP
WBC # BLD: 10.2 K/UL — SIGNIFICANT CHANGE UP (ref 3.8–10.5)
WBC # BLD: 11 K/UL — HIGH (ref 3.8–10.5)
WBC # BLD: 11 K/UL — HIGH (ref 3.8–10.5)
WBC # FLD AUTO: 10.2 K/UL — SIGNIFICANT CHANGE UP (ref 3.8–10.5)
WBC # FLD AUTO: 11 K/UL — HIGH (ref 3.8–10.5)
WBC # FLD AUTO: 11 K/UL — HIGH (ref 3.8–10.5)

## 2017-09-07 PROCEDURE — 71010: CPT | Mod: 26

## 2017-09-07 RX ORDER — HYDROMORPHONE HYDROCHLORIDE 2 MG/ML
2 INJECTION INTRAMUSCULAR; INTRAVENOUS; SUBCUTANEOUS ONCE
Qty: 0 | Refills: 0 | Status: DISCONTINUED | OUTPATIENT
Start: 2017-09-07 | End: 2017-09-07

## 2017-09-07 RX ORDER — HYDROMORPHONE HYDROCHLORIDE 2 MG/ML
0.5 INJECTION INTRAMUSCULAR; INTRAVENOUS; SUBCUTANEOUS ONCE
Qty: 0 | Refills: 0 | Status: DISCONTINUED | OUTPATIENT
Start: 2017-09-07 | End: 2017-09-07

## 2017-09-07 RX ORDER — METOPROLOL TARTRATE 50 MG
25 TABLET ORAL ONCE
Qty: 0 | Refills: 0 | Status: COMPLETED | OUTPATIENT
Start: 2017-09-07 | End: 2017-09-07

## 2017-09-07 RX ORDER — HEPARIN SODIUM 5000 [USP'U]/ML
900 INJECTION INTRAVENOUS; SUBCUTANEOUS
Qty: 25000 | Refills: 0 | Status: DISCONTINUED | OUTPATIENT
Start: 2017-09-07 | End: 2017-09-09

## 2017-09-07 RX ORDER — LABETALOL HCL 100 MG
10 TABLET ORAL ONCE
Qty: 0 | Refills: 0 | Status: COMPLETED | OUTPATIENT
Start: 2017-09-07 | End: 2017-09-07

## 2017-09-07 RX ORDER — NITROGLYCERIN 6.5 MG
0.4 CAPSULE, EXTENDED RELEASE ORAL ONCE
Qty: 0 | Refills: 0 | Status: COMPLETED | OUTPATIENT
Start: 2017-09-07 | End: 2017-09-07

## 2017-09-07 RX ORDER — METOPROLOL TARTRATE 50 MG
75 TABLET ORAL
Qty: 0 | Refills: 0 | Status: DISCONTINUED | OUTPATIENT
Start: 2017-09-08 | End: 2017-09-08

## 2017-09-07 RX ORDER — HYDROMORPHONE HYDROCHLORIDE 2 MG/ML
1 INJECTION INTRAMUSCULAR; INTRAVENOUS; SUBCUTANEOUS ONCE
Qty: 0 | Refills: 0 | Status: DISCONTINUED | OUTPATIENT
Start: 2017-09-07 | End: 2017-09-07

## 2017-09-07 RX ORDER — FUROSEMIDE 40 MG
80 TABLET ORAL ONCE
Qty: 0 | Refills: 0 | Status: COMPLETED | OUTPATIENT
Start: 2017-09-07 | End: 2017-09-07

## 2017-09-07 RX ORDER — NITROGLYCERIN 6.5 MG
0.4 CAPSULE, EXTENDED RELEASE ORAL
Qty: 0 | Refills: 0 | Status: COMPLETED | OUTPATIENT
Start: 2017-09-07 | End: 2017-09-07

## 2017-09-07 RX ORDER — NITROGLYCERIN 6.5 MG
10 CAPSULE, EXTENDED RELEASE ORAL
Qty: 50 | Refills: 0 | Status: DISCONTINUED | OUTPATIENT
Start: 2017-09-07 | End: 2017-09-08

## 2017-09-07 RX ADMIN — TAMSULOSIN HYDROCHLORIDE 0.4 MILLIGRAM(S): 0.4 CAPSULE ORAL at 22:32

## 2017-09-07 RX ADMIN — Medication 0.4 MILLIGRAM(S): at 17:14

## 2017-09-07 RX ADMIN — Medication 30 MILLIGRAM(S): at 06:04

## 2017-09-07 RX ADMIN — HEPARIN SODIUM 900 UNIT(S)/HR: 5000 INJECTION INTRAVENOUS; SUBCUTANEOUS at 01:19

## 2017-09-07 RX ADMIN — Medication 0.4 MILLIGRAM(S): at 20:39

## 2017-09-07 RX ADMIN — PANTOPRAZOLE SODIUM 40 MILLIGRAM(S): 20 TABLET, DELAYED RELEASE ORAL at 06:04

## 2017-09-07 RX ADMIN — BUDESONIDE AND FORMOTEROL FUMARATE DIHYDRATE 2 PUFF(S): 160; 4.5 AEROSOL RESPIRATORY (INHALATION) at 12:31

## 2017-09-07 RX ADMIN — HEPARIN SODIUM 900 UNIT(S)/HR: 5000 INJECTION INTRAVENOUS; SUBCUTANEOUS at 09:04

## 2017-09-07 RX ADMIN — Medication 25 MILLIGRAM(S): at 20:39

## 2017-09-07 RX ADMIN — Medication 81 MILLIGRAM(S): at 12:26

## 2017-09-07 RX ADMIN — Medication 50 MILLIGRAM(S): at 17:39

## 2017-09-07 RX ADMIN — HEPARIN SODIUM 900 UNIT(S)/HR: 5000 INJECTION INTRAVENOUS; SUBCUTANEOUS at 15:06

## 2017-09-07 RX ADMIN — ATORVASTATIN CALCIUM 80 MILLIGRAM(S): 80 TABLET, FILM COATED ORAL at 22:32

## 2017-09-07 RX ADMIN — HYDROMORPHONE HYDROCHLORIDE 1 MILLIGRAM(S): 2 INJECTION INTRAMUSCULAR; INTRAVENOUS; SUBCUTANEOUS at 13:56

## 2017-09-07 RX ADMIN — Medication 0.4 MILLIGRAM(S): at 19:18

## 2017-09-07 RX ADMIN — Medication 116 MILLIGRAM(S): at 22:31

## 2017-09-07 RX ADMIN — Medication 0.4 MILLIGRAM(S): at 13:57

## 2017-09-07 RX ADMIN — Medication 50 MILLIGRAM(S): at 06:04

## 2017-09-07 RX ADMIN — HEPARIN SODIUM 900 UNIT(S)/HR: 5000 INJECTION INTRAVENOUS; SUBCUTANEOUS at 22:34

## 2017-09-07 RX ADMIN — BUDESONIDE AND FORMOTEROL FUMARATE DIHYDRATE 2 PUFF(S): 160; 4.5 AEROSOL RESPIRATORY (INHALATION) at 22:38

## 2017-09-07 RX ADMIN — Medication 2: at 17:32

## 2017-09-07 RX ADMIN — CEFTRIAXONE 100 GRAM(S): 500 INJECTION, POWDER, FOR SOLUTION INTRAMUSCULAR; INTRAVENOUS at 18:54

## 2017-09-07 RX ADMIN — Medication 10 MILLIGRAM(S): at 23:33

## 2017-09-07 RX ADMIN — CLOPIDOGREL BISULFATE 75 MILLIGRAM(S): 75 TABLET, FILM COATED ORAL at 12:26

## 2017-09-07 RX ADMIN — Medication 0.4 MILLIGRAM(S): at 17:01

## 2017-09-07 RX ADMIN — HYDROMORPHONE HYDROCHLORIDE 1 MILLIGRAM(S): 2 INJECTION INTRAMUSCULAR; INTRAVENOUS; SUBCUTANEOUS at 13:30

## 2017-09-07 NOTE — CONSULT NOTE ADULT - ASSESSMENT
73yo male, St [place] speaking, wife is current patient in ICU, daughter at bedside to [facility], [facility] CAD s/p 12-14 stents (placed [phone]), DM, HTN, HLD, CVA (4 yrs ago, residual left sided facial weakness), lumbar radiculopathy with herniated disc, COPD (never smoker, passive smoke exposure), CKD (last creatinine 2 months ago was 1.2), bipolar disorder (no meds, sees psychiatrist), chronic kidney stones (has had ureteral stent in past) p/w 2 days intractable right flank pain radiating to groin, chills, vomiting.  BIBA.    ID is consulted for treatment of pyelonephritis.     Impression and plan :   1.complicated UTI with b/l U stones and mild right hydronephrosis. u/a grossly positive, nitrite +, likley G -ve infection.     Patient was started on ceftriaxone and responded well. Afebrile, hemodynamically stable, WBC trended down and no hx of MDR UTI in past.     ·	continue ceftriaxone.   ·	follow up final urine and blood cultures.   ·	urology follow up for need of cystoscopy and stone removal    2. LIN on CKD:   follow up I and O   daily BMP    3. Positive troponins, hxof CAD:   management as per ICU and cards.     Case discussed in detail with the primary team.  Thanks for this consultation. please call me if any questions at [phone].

## 2017-09-07 NOTE — CONSULT NOTE ADULT - PROBLEM SELECTOR RECOMMENDATION 4
hx of recurrent renal stones /ureteral stones, with rt.mild hydro  -plan as per urology  -pt will need renal stone w/u as outpatient  -may have uric acid stones  -f/u urine PH

## 2017-09-07 NOTE — PROGRESS NOTE ADULT - SUBJECTIVE AND OBJECTIVE BOX
INTERVAL HPI/OVERNIGHT EVENTS: Patient currently denied chest pain. Vitals are stable.         Antimicrobial:  cefTRIAXone   IVPB 1 Gram(s) IV Intermittent every 24 hours    Cardiovascular:  tamsulosin 0.4 milliGRAM(s) Oral at bedtime  metoprolol 50 milliGRAM(s) Oral two times a day  NIFEdipine XL 30 milliGRAM(s) Oral daily    Pulmonary:  buDESOnide  80 MICROgram(s)/formoterol 4.5 MICROgram(s) Inhaler 2 Puff(s) Inhalation two times a day    Hematalogic:  heparin  Infusion.  Unit(s)/Hr IV Continuous <Continuous>  aspirin  chewable 81 milliGRAM(s) Oral daily  clopidogrel Tablet 75 milliGRAM(s) Oral daily    Other:  HYDROmorphone  Injectable 0.5 milliGRAM(s) IV Push every 3 hours PRN  pantoprazole    Tablet 40 milliGRAM(s) Oral before breakfast  atorvastatin 80 milliGRAM(s) Oral at bedtime  insulin lispro (HumaLOG) corrective regimen sliding scale   SubCutaneous Before meals and at bedtime  dextrose 5%. 1000 milliLiter(s) IV Continuous <Continuous>  dextrose Gel 1 Dose(s) Oral once PRN  dextrose 50% Injectable 12.5 Gram(s) IV Push once  dextrose 50% Injectable 25 Gram(s) IV Push once  dextrose 50% Injectable 25 Gram(s) IV Push once  glucagon  Injectable 1 milliGRAM(s) IntraMuscular once PRN    heparin  Infusion.  Unit(s)/Hr IV Continuous <Continuous>  HYDROmorphone  Injectable 0.5 milliGRAM(s) IV Push every 3 hours PRN  aspirin  chewable 81 milliGRAM(s) Oral daily  cefTRIAXone   IVPB 1 Gram(s) IV Intermittent every 24 hours  buDESOnide  80 MICROgram(s)/formoterol 4.5 MICROgram(s) Inhaler 2 Puff(s) Inhalation two times a day  tamsulosin 0.4 milliGRAM(s) Oral at bedtime  pantoprazole    Tablet 40 milliGRAM(s) Oral before breakfast  metoprolol 50 milliGRAM(s) Oral two times a day  NIFEdipine XL 30 milliGRAM(s) Oral daily  atorvastatin 80 milliGRAM(s) Oral at bedtime  insulin lispro (HumaLOG) corrective regimen sliding scale   SubCutaneous Before meals and at bedtime  dextrose 5%. 1000 milliLiter(s) IV Continuous <Continuous>  dextrose Gel 1 Dose(s) Oral once PRN  dextrose 50% Injectable 12.5 Gram(s) IV Push once  dextrose 50% Injectable 25 Gram(s) IV Push once  dextrose 50% Injectable 25 Gram(s) IV Push once  glucagon  Injectable 1 milliGRAM(s) IntraMuscular once PRN  clopidogrel Tablet 75 milliGRAM(s) Oral daily    Drug Dosing Weight  Height (cm): 172.72 (06 Sep 2017 21:30)  Weight (kg): 88.7 (06 Sep 2017 21:30)  BMI (kg/m2): 29.7 (06 Sep 2017 21:30)  BSA (m2): 2.02 (06 Sep 2017 21:30)    CENTRAL LINE: [ ] YES [x ] NO  LOCATION:   DATE INSERTED:  REMOVE: [ ] YES [ ] NO  EXPLAIN:    CERVANTES: [ ] YES [x ] NO    DATE INSERTED:  REMOVE:  [ ] YES [ ] NO  EXPLAIN:    A-LINE:  [ ] YES [x ] NO  LOCATION:   DATE INSERTED:  REMOVE:  [ ] YES [ ] NO  EXPLAIN:    PMH -reviewed admission note, no change since admission    ICU Vital Signs Last 24 Hrs  T(C): 35.9 (07 Sep 2017 06:00), Max: 37.6 (06 Sep 2017 20:59)  T(F): 96.7 (07 Sep 2017 06:00), Max: 99.6 (06 Sep 2017 20:59)  HR: 92 (07 Sep 2017 06:00) (67 - 99)  BP: 158/101 (07 Sep 2017 06:00) (138/82 - 207/100)  BP(mean): 108 (07 Sep 2017 06:00) (88 - 108)  ABP: --  ABP(mean): --  RR: 12 (07 Sep 2017 06:00) (12 - 26)  SpO2: 97% (07 Sep 2017 06:00) (97% - 99%)             @ 07:01  -  - @ 07:00  --------------------------------------------------------  IN: 65 mL / OUT: 275 mL / NET: -210 mL            PHYSICAL EXAM:    GENERAL: [x ]NAD, [x ]well-groomed, [x ]well-developed  HEAD:  [ x]Atraumatic, [ x]Normocephalic  EYES: [x ]EOMI, [x ]PERRLA, [ ]conjunctiva and sclera clear  ENMT: [x ]No tonsillar erythema, exudates, or enlargement; [ ]Moist mucous membranes, [ ]Good dentition, [ ]No lesions  NECK: [ x]Supple, normal appearance, [ ]No JVD; [ ]Normal thyroid; [ ]Trachea midline  NERVOUS SYSTEM:  [x ]Alert & Oriented X3, [x ]Good concentration; [ ]Motor Strength 5/5 B/L upper and lower extremities; [ ]DTRs 2+ intact and symmetric  CHEST/LUNG: [x ]No chest deformity; [ ]Normal percussion bilaterally; [ ]No rales, rhonchi, wheezing   HEART: [x ]Regular rate and rhythm; [ ]No murmurs, rubs, or gallops  ABDOMEN: [x ]Soft, Nontender, Nondistended; [ ]Bowel sounds present  EXTREMITIES:  [ x]2+ Peripheral Pulses, [ ]No clubbing, cyanosis, or edema  LYMPH: [x ]No lymphadenopathy noted  SKIN: [ x]No rashes or lesions; [ ]Good capillary refill      LABS:  CBC Full  -  ( 07 Sep 2017 06:38 )  WBC Count : 10.2 K/uL  Hemoglobin : 11.7 g/dL  Hematocrit : 36.3 %  Platelet Count - Automated : 194 K/uL  Mean Cell Volume : 85.6 fl  Mean Cell Hemoglobin : 27.6 pg  Mean Cell Hemoglobin Concentration : 32.2 gm/dL  Auto Neutrophil # : 7.5 K/uL  Auto Lymphocyte # : 1.8 K/uL  Auto Monocyte # : 0.8 K/uL  Auto Eosinophil # : 0.0 K/uL  Auto Basophil # : 0.1 K/uL  Auto Neutrophil % : 73.2 %  Auto Lymphocyte % : 17.5 %  Auto Monocyte % : 8.2 %  Auto Eosinophil % : 0.4 %  Auto Basophil % : 0.7 %        140  |  108  |  42<H>  ----------------------------<  122<H>  4.1   |  21<L>  |  3.93<H>    Ca    8.6      07 Sep 2017 06:37  Phos  4.6       Mg     2.1         TPro  6.7  /  Alb  3.1<L>  /  TBili  0.6  /  DBili  x   /  AST  19  /  ALT  13  /  AlkPhos  69  -    PT/INR - ( 06 Sep 2017 16:27 )   PT: 12.3 sec;   INR: 1.13 ratio         PTT - ( 07 Sep 2017 07:05 )  PTT:64.8 sec  Urinalysis Basic - ( 06 Sep 2017 17:50 )    Color: Red / Appearance: x / S.010 / pH: x  Gluc: x / Ketone: Trace  / Bili: Negative / Urobili: Negative   Blood: x / Protein: 500 mg/dL / Nitrite: Positive   Leuk Esterase: Small / RBC: >50 /HPF / WBC 6-10 /HPF   Sq Epi: x / Non Sq Epi: x / Bacteria: Trace /HPF          RADIOLOGY & ADDITIONAL STUDIES REVIEWED:  ***    [ ]GOALS OF CARE DISCUSSION WITH PATIENT/FAMILY/PROXY:    CRITICAL CARE TIME SPENT: 35 minutes INTERVAL HPI/OVERNIGHT EVENTS: Patient currently denied chest pain. Vitals are stable.         Antimicrobial:  cefTRIAXone   IVPB 1 Gram(s) IV Intermittent every 24 hours    Cardiovascular:  tamsulosin 0.4 milliGRAM(s) Oral at bedtime  metoprolol 50 milliGRAM(s) Oral two times a day  NIFEdipine XL 30 milliGRAM(s) Oral daily    Pulmonary:  buDESOnide  80 MICROgram(s)/formoterol 4.5 MICROgram(s) Inhaler 2 Puff(s) Inhalation two times a day    Hematalogic:  heparin  Infusion.  Unit(s)/Hr IV Continuous <Continuous>  aspirin  chewable 81 milliGRAM(s) Oral daily  clopidogrel Tablet 75 milliGRAM(s) Oral daily    Other:  HYDROmorphone  Injectable 0.5 milliGRAM(s) IV Push every 3 hours PRN  pantoprazole    Tablet 40 milliGRAM(s) Oral before breakfast  atorvastatin 80 milliGRAM(s) Oral at bedtime  insulin lispro (HumaLOG) corrective regimen sliding scale   SubCutaneous Before meals and at bedtime  dextrose 5%. 1000 milliLiter(s) IV Continuous <Continuous>  dextrose Gel 1 Dose(s) Oral once PRN  dextrose 50% Injectable 12.5 Gram(s) IV Push once  dextrose 50% Injectable 25 Gram(s) IV Push once  dextrose 50% Injectable 25 Gram(s) IV Push once  glucagon  Injectable 1 milliGRAM(s) IntraMuscular once PRN    heparin  Infusion.  Unit(s)/Hr IV Continuous <Continuous>  HYDROmorphone  Injectable 0.5 milliGRAM(s) IV Push every 3 hours PRN  aspirin  chewable 81 milliGRAM(s) Oral daily  cefTRIAXone   IVPB 1 Gram(s) IV Intermittent every 24 hours  buDESOnide  80 MICROgram(s)/formoterol 4.5 MICROgram(s) Inhaler 2 Puff(s) Inhalation two times a day  tamsulosin 0.4 milliGRAM(s) Oral at bedtime  pantoprazole    Tablet 40 milliGRAM(s) Oral before breakfast  metoprolol 50 milliGRAM(s) Oral two times a day  NIFEdipine XL 30 milliGRAM(s) Oral daily  atorvastatin 80 milliGRAM(s) Oral at bedtime  insulin lispro (HumaLOG) corrective regimen sliding scale   SubCutaneous Before meals and at bedtime  dextrose 5%. 1000 milliLiter(s) IV Continuous <Continuous>  dextrose Gel 1 Dose(s) Oral once PRN  dextrose 50% Injectable 12.5 Gram(s) IV Push once  dextrose 50% Injectable 25 Gram(s) IV Push once  dextrose 50% Injectable 25 Gram(s) IV Push once  glucagon  Injectable 1 milliGRAM(s) IntraMuscular once PRN  clopidogrel Tablet 75 milliGRAM(s) Oral daily    Drug Dosing Weight  Height (cm): 172.72 (06 Sep 2017 21:30)  Weight (kg): 88.7 (06 Sep 2017 21:30)  BMI (kg/m2): 29.7 (06 Sep 2017 21:30)  BSA (m2): 2.02 (06 Sep 2017 21:30)    CENTRAL LINE: [ ] YES [x ] NO  LOCATION:   DATE INSERTED:  REMOVE: [ ] YES [ ] NO  EXPLAIN:    CERVANTES: [ ] YES [x ] NO    DATE INSERTED:  REMOVE:  [ ] YES [ ] NO  EXPLAIN:    A-LINE:  [ ] YES [x ] NO  LOCATION:   DATE INSERTED:  REMOVE:  [ ] YES [ ] NO  EXPLAIN:    PMH -reviewed admission note, no change since admission    ICU Vital Signs Last 24 Hrs  T(C): 35.9 (07 Sep 2017 06:00), Max: 37.6 (06 Sep 2017 20:59)  T(F): 96.7 (07 Sep 2017 06:00), Max: 99.6 (06 Sep 2017 20:59)  HR: 92 (07 Sep 2017 06:00) (67 - 99)  BP: 158/101 (07 Sep 2017 06:00) (138/82 - 207/100)  BP(mean): 108 (07 Sep 2017 06:00) (88 - 108)  ABP: --  ABP(mean): --  RR: 12 (07 Sep 2017 06:00) (12 - 26)  SpO2: 97% (07 Sep 2017 06:00) (97% - 99%)             @ 07:01  -  - @ 07:00  --------------------------------------------------------  IN: 65 mL / OUT: 275 mL / NET: -210 mL            PHYSICAL EXAM:    GENERAL: [x ]NAD, [x ]well-groomed, [x ]well-developed  HEAD:  [ x]Atraumatic, [ x]Normocephalic  EYES: [x ]EOMI, [x ]PERRLA, [ ]conjunctiva and sclera clear  ENMT: [x ]No tonsillar erythema, exudates, or enlargement; [ ]Moist mucous membranes, [ ]Good dentition, [ ]No lesions  NECK: [ x]Supple, normal appearance, [ ]No JVD; [ ]Normal thyroid; [ ]Trachea midline  NERVOUS SYSTEM:  [x ]Alert & Oriented X3, [x ]Good concentration; [ ]Motor Strength 5/5 B/L upper and lower extremities; [ ]DTRs 2+ intact and symmetric  CHEST/LUNG: [x ]No chest deformity; [ ]Normal percussion bilaterally; [ ]No rales, rhonchi, wheezing   HEART: [x ]Regular rate and rhythm; [ ]No murmurs, rubs, or gallops  ABDOMEN: [x ]Soft, Nontender, Nondistended; [ ]Bowel sounds present  EXTREMITIES:  [ x]2+ Peripheral Pulses, [ ]No clubbing, cyanosis, or edema  LYMPH: [x ]No lymphadenopathy noted  SKIN: [ x]No rashes or lesions; [ ]Good capillary refill      LABS:  CBC Full  -  ( 07 Sep 2017 06:38 )  WBC Count : 10.2 K/uL  Hemoglobin : 11.7 g/dL  Hematocrit : 36.3 %  Platelet Count - Automated : 194 K/uL  Mean Cell Volume : 85.6 fl  Mean Cell Hemoglobin : 27.6 pg  Mean Cell Hemoglobin Concentration : 32.2 gm/dL  Auto Neutrophil # : 7.5 K/uL  Auto Lymphocyte # : 1.8 K/uL  Auto Monocyte # : 0.8 K/uL  Auto Eosinophil # : 0.0 K/uL  Auto Basophil # : 0.1 K/uL  Auto Neutrophil % : 73.2 %  Auto Lymphocyte % : 17.5 %  Auto Monocyte % : 8.2 %  Auto Eosinophil % : 0.4 %  Auto Basophil % : 0.7 %        140  |  108  |  42<H>  ----------------------------<  122<H>  4.1   |  21<L>  |  3.93<H>    Ca    8.6      07 Sep 2017 06:37  Phos  4.6       Mg     2.1         TPro  6.7  /  Alb  3.1<L>  /  TBili  0.6  /  DBili  x   /  AST  19  /  ALT  13  /  AlkPhos  69  -    PT/INR - ( 06 Sep 2017 16:27 )   PT: 12.3 sec;   INR: 1.13 ratio         PTT - ( 07 Sep 2017 07:05 )  PTT:64.8 sec  Urinalysis Basic - ( 06 Sep 2017 17:50 )    Color: Red / Appearance: x / S.010 / pH: x  Gluc: x / Ketone: Trace  / Bili: Negative / Urobili: Negative   Blood: x / Protein: 500 mg/dL / Nitrite: Positive   Leuk Esterase: Small / RBC: >50 /HPF / WBC 6-10 /HPF   Sq Epi: x / Non Sq Epi: x / Bacteria: Trace /HPF        [x ]GOALS OF CARE DISCUSSION WITH PATIENT/FAMILY/PROXY: Discussed with daughter    CRITICAL CARE TIME SPENT: 35 minutes

## 2017-09-07 NOTE — PROGRESS NOTE ADULT - ATTENDING COMMENTS
73yo male, [place] speaking, wife is current patient in ICU, daughter at bedside to [facility], [facility] CAD s/p 12-14 stents (placed [id number]), DM, HTN, HLD, CVA (4 yrs ago, residual left sided facial weakness), lumbar radiculopathy with herniated disc, COPD (never smoker, passive smoke exposure), CKD (last creatinine 2 months ago was 1.2), bipolar disorder (no meds, sees psychiatrist), chronic kidney stones (has had ureteral stent in past) p/w 2 days intractable right flank pain radiating to groin, chills, vomiting 2/2 kidney stones/UTI, rule out pyelonephritis/obstruction.  Patient with NSTEMI with unstable angina and TWI lateral leads    Problem/Plan - 1:  ·  Problem: NSTEMI (non-ST elevated myocardial infarction).  Plan: patient presented with unstable angina in ED  TWI precordial and lateral leads, elevated troponin  currently, chest pain free after ASA, plavix loading, s/p NTG, heparin gtt  no beds in CCU and no cath tonight given lin on ckd  - trend troponin: last troponin 1.8.   - TTE in AM  - ASA, statin, BB, heparin gtt  - cardiology consult -Dr Briseno.     Problem/Plan - 2:  ·  Problem: Hypertensive emergency.  Plan: SBP 200s, responded to labetalol and lopressor IV  - NGT drip for 3 hour and switched to procardia and metoprolol.     Problem/Plan - 3:  ·  Problem: LIN (acute kidney injury).  Plan: LIN on CKD, last creatinine 2.1 in 2016  CT abdomen/pelvis: right hydronephrosis, right ureteral stone; bilateral renal calculi, left renal atrophy.  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  patient refused james  - urology consult tonight - Dr Irvin consulted.     Problem/Plan - 4:  ·  Problem: Pyelonephritis.  Plan: fever, chills, vomiting, perinephric stranding on CT scan, + R CVAT  UA positive nitrite/LE/WBC  - ceftriaxone day 1  - follow up UCx and Bcx.     Problem/Plan - 5:  ·  Problem: Type 2 diabetes mellitus with diabetic chronic kidney disease, unspecified CKD stage, unspecified long term insulin use status.  Plan: BSL controlled  - hold oral hypoglycemics  - diabetic diet, HSS, follow up A1C.     Problem/Plan - 6:  Problem: Need for prophylactic measure. Plan: improve score = 2, on heparin gtt 71yo male, [place] speaking, wife is current patient in ICU, daughter at bedside to [facility], [facility] CAD s/p 12-14 stents (placed [id number]), DM, HTN, HLD, CVA (4 yrs ago, residual left sided facial weakness), lumbar radiculopathy with herniated disc, COPD (never smoker, passive smoke exposure), CKD (last creatinine 2 months ago was 1.2), bipolar disorder (no meds, sees psychiatrist), chronic kidney stones (has had ureteral stent in past) p/w 2 days intractable right flank pain radiating to groin, chills, vomiting 2/2 kidney stones/UTI, rule out pyelonephritis/obstruction.  Patient with NSTEMI with unstable angina and TWI lateral leads    Problem/Plan - 1:  ·  Problem: NSTEMI (non-ST elevated myocardial infarction).  Plan: patient presented with unstable angina in ED  TWI precordial and lateral leads, elevated troponin    I spoke with daughter at bedside about plan and care  currently, chest pain free after ASA, plavix loading, s/p NTG, heparin gtt  no beds in CCU and no cath tonight given lin on ckd  - trend troponin: last troponin 1.8.   - TTE in AM  - ASA, statin, BB, heparin gtt  - cardiology consult -Dr Briseno.     Problem/Plan - 2:  ·  Problem: Hypertensive emergency.  Plan: SBP 200s, responded to labetalol and lopressor IV  - NGT drip for 3 hour and switched to procardia and metoprolol.     Problem/Plan - 3:  ·  Problem: LIN (acute kidney injury).  Plan: LIN on CKD, last creatinine 2.1 in 2016  CT abdomen/pelvis: right hydronephrosis, right ureteral stone; bilateral renal calculi, left renal atrophy.  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  patient refused james  - urology consult tonight - Dr Irvin consulted.     Problem/Plan - 4:  ·  Problem: Pyelonephritis.  Plan: fever, chills, vomiting, perinephric stranding on CT scan, + R CVAT  UA positive nitrite/LE/WBC  - ceftriaxone day 1  - follow up UCx and Bcx.     Problem/Plan - 5:  ·  Problem: Type 2 diabetes mellitus with diabetic chronic kidney disease, unspecified CKD stage, unspecified long term insulin use status.  Plan: BSL controlled  - hold oral hypoglycemics  - diabetic diet, HSS, follow up A1C.     Problem/Plan - 6:  Problem: Need for prophylactic measure. Plan: improve score = 2, on heparin gtt

## 2017-09-07 NOTE — PROGRESS NOTE ADULT - PROBLEM SELECTOR PLAN 4
fever, chills, vomiting, perinephric stranding on CT scan, + R CVAT  UA positive nitrite/LE/WBC  - ceftriaxone day 1  - follow up UCx and Bcx

## 2017-09-07 NOTE — CONSULT NOTE ADULT - PROBLEM SELECTOR RECOMMENDATION 2
stage uknown, r/o stage 3 , secondary to dm/htn  -Keep patient euvolemic and renal diet  -Avoid Nephrotoxic Meds/ Agents such as (NSAIDs, IV contrast, Aminoglycosides such as gentamicin, -Gadolinium contrast, Phosphate containing enemas, etc..)  -Adjust Medications according to eGFR

## 2017-09-07 NOTE — CONSULT NOTE ADULT - SUBJECTIVE AND OBJECTIVE BOX
Patient is a 72y Male whom presented to the hospital with flank pain ,being treated for NSTEMI, noted to have naman.    Medical HPI:  71yo male, Icelandic speaking, wife is current patient in ICU, daughter at bedside to translate, PMH CAD s/p 12-14 stents (placed [id number]), DM, HTN, HLD, CVA (4 yrs ago, residual left sided facial weakness), lumbar radiculopathy with herniated disc, COPD (never smoker, passive smoke exposure), CKD (last creatinine 2 months ago was 1.2), bipolar disorder (no meds, sees psychiatrist), chronic kidney stones (has had ureteral stent in past) p/w 2 days intractable right flank pain radiating to groin, chills, vomiting.  BIBA.  In ED, endorsed 10/10 substernal chest tightness with radiation to jaw.  ROS + chest pain at rest, CHAMPION after 2 steps, lower abdominal pain.  Denies fever, orthopnea, leg swelling, sore throat, diarrhea, dysuria, hematuria.  Last cardiac cath 2months ago, showed stent migration and stenosis, but patient refused stent.  Patient refusing james    In ED,  patient comfortable in stretcher  tmax 99 P 74 /100 RR 18 02 sat 98% RA  /77 after labetolol 10 IV and lopressor 5mg IV  s/p , plavix 300, NTG 0.4 SL  UA + WBC and RBC  Cr 3.55, renal ultrasound (06 Sep 2017 20:37)  Renal HPI:  pts current chart reviewed and case discussed with resident  pt is known to have renal ds(ckd) with baseline cr of 2.0 in 2017 as per pts primary renal MD.pt also gives hx of renal stones/naman secondary to obstructive uropathy    He denies pmh of nephrotic syndrome, recurrent uti ,bph or proteinuria  pt denies Nsaids use or otc other nephrotoxic supplements    PAST MEDICAL & SURGICAL HISTORY:  HLD (hyperlipidemia)  Anemia  Acute renal failure  CKD (chronic kidney disease)  COPD (chronic obstructive pulmonary disease)  Fainting  Cardiac arrhythmia  Dizziness  Hydronephrosis  Cholecystitis  Bipolar 1 disorder  DM (diabetes mellitus)  HTN (hypertension)  Lumbar radiculopathy  Left heart failure  Reflux esophagitis  Coronary atherosclerosis  No significant past surgical history      Home Medications: Reviewed    MEDICATIONS  (STANDING):  aspirin  chewable 81 milliGRAM(s) Oral daily  cefTRIAXone   IVPB 1 Gram(s) IV Intermittent every 24 hours  buDESOnide  80 MICROgram(s)/formoterol 4.5 MICROgram(s) Inhaler 2 Puff(s) Inhalation two times a day  tamsulosin 0.4 milliGRAM(s) Oral at bedtime  pantoprazole    Tablet 40 milliGRAM(s) Oral before breakfast  metoprolol 50 milliGRAM(s) Oral two times a day  NIFEdipine XL 30 milliGRAM(s) Oral daily  atorvastatin 80 milliGRAM(s) Oral at bedtime  insulin lispro (HumaLOG) corrective regimen sliding scale   SubCutaneous Before meals and at bedtime  clopidogrel Tablet 75 milliGRAM(s) Oral daily  heparin  Infusion. 900 Unit(s)/Hr (9 mL/Hr) IV Continuous <Continuous>    MEDICATIONS  (PRN):  HYDROmorphone  Injectable 0.5 milliGRAM(s) IV Push every 3 hours PRN pain  nitroglycerin     SubLingual 0.4 milliGRAM(s) SubLingual every 5 minutes PRN Chest Pain      Allergies    No Known Allergies    Intolerances        SOCIAL HISTORY:  Alcohol use [X ] No  [ ] Yes  Smoking  [ X] No  [ ] Yes  Drug Abuse [X ] No  [ ] Yes  Tattoo [X ] No  [ ] Yes  History of Blood transfusion [ X] No  [ ] Yes    FAMILY HISTORY:n/c      REVIEW OF SYSTEMS:  CONSTITUTIONAL: No weakness, fevers or chills  EYES/ENT: No visual changes;  No vertigo or throat pain   NECK: No pain or stiffness  RESPIRATORY: No cough, wheezing, hemoptysis; + shortness of breath  CARDIOVASCULAR: + chest pain but no palpitations  GASTROINTESTINAL: No abdominal or epigastric pain. No nausea, vomiting, or hematemesis; No diarrhea or constipation. No melena or hematochezia.  GENITOURINARY: No dysuria, frequency or hematuria.pt has rt.sided filank pain on admission , but denies any pain now.  NEUROLOGICAL: No numbness or weakness  SKIN: No itching, burning, rashes, or lesions   All other review of systems is negative unless indicated above    Vital Signs  T(F): 96.8 (17 @ 16:54), Max: 99.6 (17 @ 20:59)  HR: 99 (17 @ 17:00) (67 - 99)  BP: 179/90 (17 @ 17:00) (138/82 - 198/113)  ABP: --  RR: 22 (17 @ 17:00) (12 - 26)  SpO2: 87% (17 @ 17:00) (86% - 100%)  Wt(kg): --  CVP(cm H2O): --  CO: --  PCWP: --    I and O's:     @ 07:01  -   @ 07:00  --------------------------------------------------------  IN:    heparin  Infusion.: 65 mL  Total IN: 65 mL    OUT:    Voided: 275 mL  Total OUT: 275 mL    Total NET: -210 mL       @ 07:01  -   @ 17:51  --------------------------------------------------------  IN:    heparin  Infusion.: 9 mL    heparin  Infusion.: 63 mL    Oral Fluid: 500 mL  Total IN: 572 mL    OUT:    Voided: 300 mL  Total OUT: 300 mL    Total NET: 272 mL        Daily Height in cm: 172.72 (06 Sep 2017 21:30)    Daily     PHYSICAL EXAM:  Constitutional: well developed, obese  and in nad  HEENT: PERRLA,  no icteric sclera and non  pallor of conjunctiva noted  Neck: No JVD, thyromegaly or adenopathy  Respiratory: reduced air entry lower lungs with no rales, wheezing or rhonchi  Cardiovascular: S1 and S2 normally heard  Gastrointestinal:Obese, soft, nondistended, nontender and normal bowel sounds heard  Extremities: No peripheral edema or cyanosis  Neurological: A/O x 3, no focal deficits  Psychiatric: Normal mood, normal affect  : No flank tenderness  Skin: No rashes        LABS:                        12.7   11.0  )-----------( 210      ( 07 Sep 2017 14:03 )             39.9     4.6  --            140  |  108  |  43<H>  ----------------------------<  145<H>  4.4   |  22  |  3.92<H>      140  |  108  |  42<H>  ----------------------------<  122<H>  4.1   |  21<L>  |  3.93<H>      140  |  107  |  35<H>  ----------------------------<  189<H>  5.1   |  25  |  3.55<H>    Ca    9.3      07 Sep 2017 14:03  Ca    8.6      07 Sep 2017 06:37  Ca    10.0      06 Sep 2017 16:27  Phos  4.6       Mg     2.1         TPro  6.7  /  Alb  3.1<L>  /  TBili  0.6  /  DBili  x   /  AST  19  /  ALT  13  /  AlkPhos  69    TPro  8.0  /  Alb  3.7  /  TBili  0.4  /  DBili  x   /  AST  15  /  ALT  19  /  AlkPhos  83            URINE STUDIES:  Urinalysis Basic - ( 06 Sep 2017 17:50 )    Color: Red / Appearance: x / S.010 / pH: x  Gluc: x / Ketone: Trace  / Bili: Negative / Urobili: Negative   Blood: x / Protein: 500 mg/dL / Nitrite: Positive   Leuk Esterase: Small / RBC: >50 /HPF / WBC 6-10 /HPF   Sq Epi: x / Non Sq Epi: x / Bacteria: Trace /HPF        Sodium, Random Urine: 47 mmol/L ( @ 14:03)  Osmolality, Random Urine: 357 mos/kg ( @ 14:03)        Radiology studies:      < from: CT Abdomen and Pelvis No Cont (17 @ 21:23) >  EXAM:  CT ABDOMEN AND PELVIS                            PROCEDURE DATE:  2017          INTERPRETATION:  CLINICAL INFORMATION: History of renal stones    COMPARISON: 5/3/2012 and ultrasound dated 2017    PROCEDURE:     Serial axial sections through the abdomen and pelvis are obtained without   the use of intravenous contrast from the diaphragms to the symphysis   pubis. 3-D coronal and sagittal reformations were then created from the   axial images.      FINDINGS:    LOWER CHEST: Smallright pleural effusion. Bibasilar atelectasis and   septal thickening. Coronary artery calcifications. Small pericardial   effusion.    Evaluation of solid visceral organs and vascular structures is limited by   lack of intravenous contrast.     ABDOMEN:  LIVER: Within normal limits.  BILE DUCTS: Normal caliber.  GALLBLADDER: No calcified gallstones. Normal caliber wall.  PANCREAS: Stable appearance of pancreas.  SPLEEN: Within normal limits.  ADRENALS: Within normal limits.  KIDNEYS/URETERS: Tworight proximal ureteral calculi measuring 7 mm and 8   mm. Mild right hydronephrosis and right perinephric stranding/fluid.   Additional nonobstructive right renal calculi measuring up to 10 mm.   Atrophic left kidney containing nonobstructive calculi measuring up to 2   mm. No left hydronephrosis. Hypodense renal lesions, possibly cysts.    PELVIS:  REPRODUCTIVE ORGANS: The prostate gland and seminal vesicles are within   normal limits.  URINARY BLADDER: Left ureterovesical junction calculi measuring up to 12   mm.    BOWEL: Colonic diverticulosis. No bowel obstruction. Duodenal   diverticulum.  APPENDIX: Within normal limits.  PERITONEUM: No ascites or free air. No fluid collection.  VESSELS: Atherosclerotic changes.  RETROPERITONEUM: Within normal limits.  ABDOMINAL WALL: Tiny fat-containing umbilical hernia.  BONES: Spinal degenerative changes.    IMPRESSION:      Two RIGHT proximal ureteral calculi measuring 7 mm and 8 mm.   Mild RIGHT hydronephrosis.    < end of copied text >      < from: US Renal (17 @ 16:50) >  EXAM:  US KIDNEY(S)                            PROCEDURE DATE:  2017          INTERPRETATION:  EXAM: US KIDNEY(S)   INDICATION: Kidney pain.  COMPARISON: None.    TECHNIQUE: Grayscale ultrasound of the kidneys was performed.    FINDINGS:    Right kidney: Normal size, measures 12.0 x 6.1 x 6.2 cm. There is a 1.0   cm shadowing calculus in the lower pole. Mild hydronephrosis. No   perinephric fluid collection.    Left kidney: Normal size, measures 10.2 x 4.3 x 4.8 cm. Multiple   shadowing calculi measuring up to 1.2 cm. No hydronephrosis or   perinephric fluid collection. There is a cyst in the upper pole measuring   1.6 cm.    Urinary bladder contains a 1.9 cm shadowing calculus. Urinary bladder is   incompletely distended, which limits evaluation.    Visualized proximal abdominal aorta measures 1.9 cm in AP dimension.   Visualized IVC is grossly unremarkable.    IMPRESSION:   Bilateral renal calculi. Mild right-sided hydronephrosis. Urinary bladder   calculus.        < end of copied text >  < from: Xray Chest 1 View AP- PORTABLE-Urgent (17 @ 19:34) >  EXAM:  CHEST-PORTABLE URGENT                            PROCEDURE DATE:  2017          INTERPRETATION:  PORTABLE CHEST X-RAY    HISTORY: CP.    COMPARISON: None.    FINDINGS:  There is diffuse interstitial prominence of the right lung and theleft   lung base, which may be due to asymmetric edema or infection.  No pneumothorax or significant pleural effusion.   The cardiac silhouette is not accurately assessed by AP technique. Aortic   calcifications.    IMPRESSION:  Diffuse interstitial prominence of the right lung and the left lung base,   which may be due to asymmetric edema or infection.      < end of copied text > Patient is a 72y Male whom presented to the hospital with flank pain ,being treated for NSTEMI, noted to have naman.    Medical HPI:  71yo male, Maltese speaking, wife is current patient in ICU, daughter at bedside to translate, PMH CAD s/p 12-14 stents (placed [id number]), DM, HTN, HLD, CVA (4 yrs ago, residual left sided facial weakness), lumbar radiculopathy with herniated disc, COPD (never smoker, passive smoke exposure), CKD (last creatinine 2 months ago was 1.2), bipolar disorder (no meds, sees psychiatrist), chronic kidney stones (has had ureteral stent in past) p/w 2 days intractable right flank pain radiating to groin, chills, vomiting.  BIBA.  In ED, endorsed 10/10 substernal chest tightness with radiation to jaw.  ROS + chest pain at rest, CHAMPION after 2 steps, lower abdominal pain.  Denies fever, orthopnea, leg swelling, sore throat, diarrhea, dysuria, hematuria.  Last cardiac cath 2months ago, showed stent migration and stenosis, but patient refused stent.  Patient refusing james    In ED,  patient comfortable in stretcher  tmax 99 P 74 /100 RR 18 02 sat 98% RA  /77 after labetolol 10 IV and lopressor 5mg IV  s/p , plavix 300, NTG 0.4 SL  UA + WBC and RBC  Cr 3.55, renal ultrasound (06 Sep 2017 20:37)  Renal HPI:Hx obtained from pts daughter at bedside and renal status d/w pts primary Renal MD  pts current chart reviewed and case discussed with resident  pt is known to have renal ds(ckd) with baseline cr of 2.0 in 2017 as per pts primary renal MD.pt also gives hx of renal stones/naman secondary to obstructive uropathy    He denies pmh of nephrotic syndrome, recurrent uti ,bph or proteinuria  pt denies Nsaids use or otc other nephrotoxic supplements    PAST MEDICAL & SURGICAL HISTORY:  HLD (hyperlipidemia)  Anemia  Acute renal failure  CKD (chronic kidney disease)  COPD (chronic obstructive pulmonary disease)  Fainting  Cardiac arrhythmia  Dizziness  Hydronephrosis  Cholecystitis  Bipolar 1 disorder  DM (diabetes mellitus)  HTN (hypertension)  Lumbar radiculopathy  Left heart failure  Reflux esophagitis  Coronary atherosclerosis  No significant past surgical history      Home Medications: Reviewed    MEDICATIONS  (STANDING):  aspirin  chewable 81 milliGRAM(s) Oral daily  cefTRIAXone   IVPB 1 Gram(s) IV Intermittent every 24 hours  buDESOnide  80 MICROgram(s)/formoterol 4.5 MICROgram(s) Inhaler 2 Puff(s) Inhalation two times a day  tamsulosin 0.4 milliGRAM(s) Oral at bedtime  pantoprazole    Tablet 40 milliGRAM(s) Oral before breakfast  metoprolol 50 milliGRAM(s) Oral two times a day  NIFEdipine XL 30 milliGRAM(s) Oral daily  atorvastatin 80 milliGRAM(s) Oral at bedtime  insulin lispro (HumaLOG) corrective regimen sliding scale   SubCutaneous Before meals and at bedtime  clopidogrel Tablet 75 milliGRAM(s) Oral daily  heparin  Infusion. 900 Unit(s)/Hr (9 mL/Hr) IV Continuous <Continuous>    MEDICATIONS  (PRN):  HYDROmorphone  Injectable 0.5 milliGRAM(s) IV Push every 3 hours PRN pain  nitroglycerin     SubLingual 0.4 milliGRAM(s) SubLingual every 5 minutes PRN Chest Pain      Allergies    No Known Allergies    Intolerances        SOCIAL HISTORY:  Alcohol use [X ] No  [ ] Yes  Smoking  [ X] No  [ ] Yes  Drug Abuse [X ] No  [ ] Yes  Tattoo [X ] No  [ ] Yes  History of Blood transfusion [ X] No  [ ] Yes    FAMILY HISTORY:n/c      REVIEW OF SYSTEMS:  CONSTITUTIONAL: No weakness, fevers or chills  EYES/ENT: No visual changes;  No vertigo or throat pain   NECK: No pain or stiffness  RESPIRATORY: No cough, wheezing, hemoptysis; + shortness of breath  CARDIOVASCULAR: + chest pain but no palpitations  GASTROINTESTINAL: No abdominal or epigastric pain. No nausea, vomiting, or hematemesis; No diarrhea or constipation. No melena or hematochezia.  GENITOURINARY: No dysuria, frequency or hematuria.pt had rt.sided filank pain on admission , but denies any pain now.  NEUROLOGICAL: No numbness or weakness  SKIN: No itching, burning, rashes, or lesions   All other review of systems is negative unless indicated above    Vital Signs  T(F): 96.8 (17 @ 16:54), Max: 99.6 (17 @ 20:59)  HR: 99 (17 @ 17:00) (67 - 99)  BP: 179/90 (17 @ 17:00) (138/82 - 198/113)  ABP: --  RR: 22 (17 @ 17:00) (12 - 26)  SpO2: 87% (17 @ 17:00) (86% - 100%)  Wt(kg): --  CVP(cm H2O): --  CO: --  PCWP: --    I and O's:     @ 07:01  -   @ 07:00  --------------------------------------------------------  IN:    heparin  Infusion.: 65 mL  Total IN: 65 mL    OUT:    Voided: 275 mL  Total OUT: 275 mL    Total NET: -210 mL       @ 07:01  -   @ 17:51  --------------------------------------------------------  IN:    heparin  Infusion.: 9 mL    heparin  Infusion.: 63 mL    Oral Fluid: 500 mL  Total IN: 572 mL    OUT:    Voided: 300 mL  Total OUT: 300 mL    Total NET: 272 mL        Daily Height in cm: 172.72 (06 Sep 2017 21:30)    Daily     PHYSICAL EXAM:  Constitutional: well developed, obese  and in nad  HEENT: PERRLA,  no icteric sclera and non  pallor of conjunctiva noted  Neck: No JVD, thyromegaly or adenopathy  Respiratory: reduced air entry lower lungs with no rales, wheezing or rhonchi  Cardiovascular: S1 and S2 normally heard  Gastrointestinal:Obese, soft, nondistended, nontender and normal bowel sounds heard  Extremities: No peripheral edema or cyanosis  Neurological: A/O x 3, no focal deficits  Psychiatric: Normal mood, normal affect  : No flank tenderness  Skin: No rashes        LABS:                        12.7   11.0  )-----------( 210      ( 07 Sep 2017 14:03 )             39.9     4.6  --            140  |  108  |  43<H>  ----------------------------<  145<H>  4.4   |  22  |  3.92<H>      140  |  108  |  42<H>  ----------------------------<  122<H>  4.1   |  21<L>  |  3.93<H>      140  |  107  |  35<H>  ----------------------------<  189<H>  5.1   |  25  |  3.55<H>    Ca    9.3      07 Sep 2017 14:03  Ca    8.6      07 Sep 2017 06:37  Ca    10.0      06 Sep 2017 16:27  Phos  4.6       Mg     2.1         TPro  6.7  /  Alb  3.1<L>  /  TBili  0.6  /  DBili  x   /  AST  19  /  ALT  13  /  AlkPhos  69    TPro  8.0  /  Alb  3.7  /  TBili  0.4  /  DBili  x   /  AST  15  /  ALT  19  /  AlkPhos  83            URINE STUDIES:  Urinalysis Basic - ( 06 Sep 2017 17:50 )    Color: Red / Appearance: x / S.010 / pH: x  Gluc: x / Ketone: Trace  / Bili: Negative / Urobili: Negative   Blood: x / Protein: 500 mg/dL / Nitrite: Positive   Leuk Esterase: Small / RBC: >50 /HPF / WBC 6-10 /HPF   Sq Epi: x / Non Sq Epi: x / Bacteria: Trace /HPF        Sodium, Random Urine: 47 mmol/L ( @ 14:03)  Osmolality, Random Urine: 357 mos/kg ( @ 14:03)        Radiology studies:      < from: CT Abdomen and Pelvis No Cont (17 @ 21:23) >  EXAM:  CT ABDOMEN AND PELVIS                            PROCEDURE DATE:  2017          INTERPRETATION:  CLINICAL INFORMATION: History of renal stones    COMPARISON: 5/3/2012 and ultrasound dated 2017    PROCEDURE:     Serial axial sections through the abdomen and pelvis are obtained without   the use of intravenous contrast from the diaphragms to the symphysis   pubis. 3-D coronal and sagittal reformations were then created from the   axial images.      FINDINGS:    LOWER CHEST: Smallright pleural effusion. Bibasilar atelectasis and   septal thickening. Coronary artery calcifications. Small pericardial   effusion.    Evaluation of solid visceral organs and vascular structures is limited by   lack of intravenous contrast.     ABDOMEN:  LIVER: Within normal limits.  BILE DUCTS: Normal caliber.  GALLBLADDER: No calcified gallstones. Normal caliber wall.  PANCREAS: Stable appearance of pancreas.  SPLEEN: Within normal limits.  ADRENALS: Within normal limits.  KIDNEYS/URETERS: Tworight proximal ureteral calculi measuring 7 mm and 8   mm. Mild right hydronephrosis and right perinephric stranding/fluid.   Additional nonobstructive right renal calculi measuring up to 10 mm.   Atrophic left kidney containing nonobstructive calculi measuring up to 2   mm. No left hydronephrosis. Hypodense renal lesions, possibly cysts.    PELVIS:  REPRODUCTIVE ORGANS: The prostate gland and seminal vesicles are within   normal limits.  URINARY BLADDER: Left ureterovesical junction calculi measuring up to 12   mm.    BOWEL: Colonic diverticulosis. No bowel obstruction. Duodenal   diverticulum.  APPENDIX: Within normal limits.  PERITONEUM: No ascites or free air. No fluid collection.  VESSELS: Atherosclerotic changes.  RETROPERITONEUM: Within normal limits.  ABDOMINAL WALL: Tiny fat-containing umbilical hernia.  BONES: Spinal degenerative changes.    IMPRESSION:      Two RIGHT proximal ureteral calculi measuring 7 mm and 8 mm.   Mild RIGHT hydronephrosis.    < end of copied text >      < from: US Renal (17 @ 16:50) >  EXAM:  US KIDNEY(S)                            PROCEDURE DATE:  2017          INTERPRETATION:  EXAM: US KIDNEY(S)   INDICATION: Kidney pain.  COMPARISON: None.    TECHNIQUE: Grayscale ultrasound of the kidneys was performed.    FINDINGS:    Right kidney: Normal size, measures 12.0 x 6.1 x 6.2 cm. There is a 1.0   cm shadowing calculus in the lower pole. Mild hydronephrosis. No   perinephric fluid collection.    Left kidney: Normal size, measures 10.2 x 4.3 x 4.8 cm. Multiple   shadowing calculi measuring up to 1.2 cm. No hydronephrosis or   perinephric fluid collection. There is a cyst in the upper pole measuring   1.6 cm.    Urinary bladder contains a 1.9 cm shadowing calculus. Urinary bladder is   incompletely distended, which limits evaluation.    Visualized proximal abdominal aorta measures 1.9 cm in AP dimension.   Visualized IVC is grossly unremarkable.    IMPRESSION:   Bilateral renal calculi. Mild right-sided hydronephrosis. Urinary bladder   calculus.        < end of copied text >  < from: Xray Chest 1 View AP- PORTABLE-Urgent (17 @ 19:34) >  EXAM:  CHEST-PORTABLE URGENT                            PROCEDURE DATE:  2017          INTERPRETATION:  PORTABLE CHEST X-RAY    HISTORY: CP.    COMPARISON: None.    FINDINGS:  There is diffuse interstitial prominence of the right lung and theleft   lung base, which may be due to asymmetric edema or infection.  No pneumothorax or significant pleural effusion.   The cardiac silhouette is not accurately assessed by AP technique. Aortic   calcifications.    IMPRESSION:  Diffuse interstitial prominence of the right lung and the left lung base,   which may be due to asymmetric edema or infection.      < end of copied text >

## 2017-09-07 NOTE — CONSULT NOTE ADULT - SUBJECTIVE AND OBJECTIVE BOX
HPI:  71yo male, [place] speaking, wife is current patient in ICU, daughter at bedside to [facility], [facility] CAD s/p 12-14 stents (placed [id number]), DM, HTN, HLD, CVA (4 yrs ago, residual left sided facial weakness), lumbar radiculopathy with herniated disc, COPD (never smoker, passive smoke exposure), CKD (last creatinine 2 months ago was 1.2), bipolar disorder (no meds, sees psychiatrist), chronic kidney stones (has had ureteral stent in past) p/w 2 days intractable right flank pain radiating to groin, chills, vomiting.  BIBA.  In ED, endorsed 10/10 substernal chest tightness with radiation to jaw.  ROS + chest pain at rest, CHAMPION after 2 steps, lower abdominal pain.  Denies fever, orthopnea, leg swelling, sore throat, diarrhea, dysuria, hematuria.  Last cardiac cath 2months ago, showed stent migration and stenosis, but patient refused stent.  Patient refusing james    In ED,  patient comfortable in stretcher  tmax 99 P 74 /100 RR 18 02 sat 98% RA  /77 after labetolol 10 IV and lopressor 5mg IV  s/p , plavix 300, NTG 0.4 SL  UA + WBC and RBC  Cr 3.55, renal ultrasound (06 Sep 2017 20:37)   Patient seen and examined.     PAST MEDICAL & SURGICAL HISTORY:  HLD (hyperlipidemia)  Anemia  Acute renal failure  CKD (chronic kidney disease)  COPD (chronic obstructive pulmonary disease)  Fainting  Cardiac arrhythmia  Dizziness  Hydronephrosis  Cholecystitis  Bipolar 1 disorder  DM (diabetes mellitus)  HTN (hypertension)  Lumbar radiculopathy  Left heart failure  Reflux esophagitis  Coronary atherosclerosis  No significant past surgical history      Allergies    No Known Allergies    Intolerances        MEDICATIONS  (STANDING):  aspirin  chewable 81 milliGRAM(s) Oral daily  cefTRIAXone   IVPB 1 Gram(s) IV Intermittent every 24 hours  buDESOnide  80 MICROgram(s)/formoterol 4.5 MICROgram(s) Inhaler 2 Puff(s) Inhalation two times a day  tamsulosin 0.4 milliGRAM(s) Oral at bedtime  pantoprazole    Tablet 40 milliGRAM(s) Oral before breakfast  metoprolol 50 milliGRAM(s) Oral two times a day  NIFEdipine XL 30 milliGRAM(s) Oral daily  atorvastatin 80 milliGRAM(s) Oral at bedtime  insulin lispro (HumaLOG) corrective regimen sliding scale   SubCutaneous Before meals and at bedtime  clopidogrel Tablet 75 milliGRAM(s) Oral daily  heparin  Infusion. 900 Unit(s)/Hr (9 mL/Hr) IV Continuous <Continuous>      MEDICATIONS  (PRN):  HYDROmorphone  Injectable 0.5 milliGRAM(s) IV Push every 3 hours PRN pain   Medications up to date at time of exam.    Medications up to date at time of exam.    FAMILY HISTORY:    no pertinent.      SOCIAL HISTORY:  lives with wife  walks with cane  never smoker  quit etoh 13 years ago  Smoking History:   Living Condition:  Work History:   Travel History: denies recent travel  Illicit Substance Use: denies  Alcohol Use: denies  Pets:    REVIEW OF SYSTEMS:    Detailed 10 point ROS done.       PHYSICAL EXAMINATION:      Vital Signs Last 24 Hrs  T(C): 35.9 (07 Sep 2017 06:00), Max: 37.6 (06 Sep 2017 20:59)  T(F): 96.7 (07 Sep 2017 06:00), Max: 99.6 (06 Sep 2017 20:59)  HR: 70 (07 Sep 2017 08:00) (67 - 99)  BP: 153/72 (07 Sep 2017 08:00) (138/82 - 207/100)  BP(mean): 92 (07 Sep 2017 08:00) (88 - 108)  RR: 18 (07 Sep 2017 08:00) (12 - 26)  SpO2: 98% (07 Sep 2017 08:00) (97% - 99%)   (if applicable)    GENERAL: The patient is a well-developed, well-nourished, in no apparent distress.     HEENT: Head is normocephalic and atraumatic. Extraocular muscles are intact. Mucous membranes are moist.     NECK: Supple, no palpable adenopathy.    LUNGS: Clear to auscultation, no wheezing, rales, or rhonchi.    HEART: Regular rate and rhythm without murmur.    ABDOMEN: Soft, nontender, and nondistended.  No hepatosplenomegaly is noted.    EXTREMITIES: Without any cyanosis, clubbing, rash, lesions or edema.    NEUROLOGIC: Awake, alert, oriented.     SKIN: Warm, dry, good turgor.      LABS:                        11.7   10.2  )-----------( 194      ( 07 Sep 2017 06:38 )             36.3         140  |  108  |  42<H>  ----------------------------<  122<H>  4.1   |  21<L>  |  3.93<H>    Ca    8.6      07 Sep 2017 06:37  Phos  4.6       Mg     2.1         TPro  6.7  /  Alb  3.1<L>  /  TBili  0.6  /  DBili  x   /  AST  19  /  ALT  13  /  AlkPhos  69      PT/INR - ( 06 Sep 2017 16:27 )   PT: 12.3 sec;   INR: 1.13 ratio         PTT - ( 07 Sep 2017 07:05 )  PTT:64.8 sec  Urinalysis Basic - ( 06 Sep 2017 17:50 )    Color: Red / Appearance: x / S.010 / pH: x  Gluc: x / Ketone: Trace  / Bili: Negative / Urobili: Negative   Blood: x / Protein: 500 mg/dL / Nitrite: Positive   Leuk Esterase: Small / RBC: >50 /HPF / WBC 6-10 /HPF   Sq Epi: x / Non Sq Epi: x / Bacteria: Trace /HPF        CARDIAC MARKERS ( 07 Sep 2017 06:37 )  1.890 ng/mL / x     / 159 U/L / x     / 4.3 ng/mL  CARDIAC MARKERS ( 07 Sep 2017 00:39 )  1.180 ng/mL / x     / x     / x     / x      CARDIAC MARKERS ( 06 Sep 2017 19:06 )  0.193 ng/mL / x     / x     / x     / x      CARDIAC MARKERS ( 06 Sep 2017 17:56 )  0.120 ng/mL / x     / x     / x     / x            < from: CT Abdomen and Pelvis No Cont (17 @ 21:23) >  IMPRESSION:      Two RIGHT proximal ureteral calculi measuring 7 mm and 8 mm.   Mild RIGHT hydronephrosis.  Atrophic left kidney, possibly chronic.  Additional nonobstructive bilateral renal calculi.  Left ureterovesical junction calculi measuring up to 12 mm. No left   hydronephrosis.  Small right pleural effusion.    < end of copied text >        < from: US Renal (17 @ 16:50) >  IMPRESSION:   Bilateral renal calculi. Mild right-sided hydronephrosis. Urinary bladder   calculus.    < end of copied text >      < from: Xray Chest 1 View AP- PORTABLE-Urgent (17 @ 19:34) >  IMPRESSION:  Diffuse interstitial prominence of the right lung and the left lung base,   which may be due to asymmetric edema or infection.    < end of copied text >            MICROBIOLOGY: (if applicable)    RADIOLOGY & ADDITIONAL STUDIES:  EKG:   CXR:  ECHO:      RECOMMENDATIONS: HPI:(HPI frpm chart, G son , daughter)  71yo male, Vincentian speaking, wife is current patient in ICU, daughter at bedside to translate, [facility] CAD s/p 12-14 stents (placed [id number]), DM, HTN, HLD, CVA (4 yrs ago, residual left sided facial weakness), lumbar radiculopathy with herniated disc, COPD (never smoker, passive smoke exposure), CKD (last creatinine 2 months ago was 1.2), bipolar disorder (no meds, sees psychiatrist), chronic kidney stones (has had ureteral stent in past) p/w 2 days intractable right flank pain radiating to groin, chills, vomiting.  BIBA.  In ED, endorsed 10/10 substernal chest tightness with radiation to jaw.  ROS + chest pain at rest, CHAMPION after 2 steps, lower abdominal pain.  Denies fever, orthopnea, leg swelling, sore throat, diarrhea, dysuria, hematuria.  Last cardiac cath 2months ago, showed stent migration and stenosis, but patient refused stent.  Patient refusing james    In ED,  patient comfortable in stretcher  tmax 99 P 74 /100 RR 18 02 sat 98% RA  /77 after labetolol 10 IV and lopressor 5mg IV  s/p , plavix 300, NTG 0.4 SL  UA + WBC and RBC  Cr 3.55, renal ultrasound (06 Sep 2017 20:37)     Patient seen and examined. at bedside. G son translated   pt had off and on long standing chest  and right flank pain. he also s/o some discomfort on urination for months.   no N/V/D.   no other symptom on 10 point ROS.   pt feels better since admission.     PAST MEDICAL & SURGICAL HISTORY:  HLD (hyperlipidemia)  Anemia  Acute renal failure  CKD (chronic kidney disease)  COPD (chronic obstructive pulmonary disease)  Fainting  Cardiac arrhythmia  Dizziness  Hydronephrosis  Cholecystitis  Bipolar 1 disorder  DM (diabetes mellitus)  HTN (hypertension)  Lumbar radiculopathy  Left heart failure  Reflux esophagitis  Coronary atherosclerosis  No significant past surgical history      Allergies    No Known Allergies    Intolerances        MEDICATIONS  (STANDING):  aspirin  chewable 81 milliGRAM(s) Oral daily  cefTRIAXone   IVPB 1 Gram(s) IV Intermittent every 24 hours  buDESOnide  80 MICROgram(s)/formoterol 4.5 MICROgram(s) Inhaler 2 Puff(s) Inhalation two times a day  tamsulosin 0.4 milliGRAM(s) Oral at bedtime  pantoprazole    Tablet 40 milliGRAM(s) Oral before breakfast  metoprolol 50 milliGRAM(s) Oral two times a day  NIFEdipine XL 30 milliGRAM(s) Oral daily  atorvastatin 80 milliGRAM(s) Oral at bedtime  insulin lispro (HumaLOG) corrective regimen sliding scale   SubCutaneous Before meals and at bedtime  clopidogrel Tablet 75 milliGRAM(s) Oral daily  heparin  Infusion. 900 Unit(s)/Hr (9 mL/Hr) IV Continuous <Continuous>      MEDICATIONS  (PRN):  HYDROmorphone  Injectable 0.5 milliGRAM(s) IV Push every 3 hours PRN pain   Medications up to date at time of exam.    Medications up to date at time of exam.    FAMILY HISTORY:    no pertinent.      SOCIAL HISTORY:  lives with wife  walks with cane  never smoker  quit etoh 13 years ago  Smoking History:   Living Condition:  Work History:   Travel History: denies recent travel  Illicit Substance Use: denies  Alcohol Use: denies  Pets:    REVIEW OF SYSTEMS:    Detailed 10 point ROS done.       PHYSICAL EXAMINATION:      Vital Signs Last 24 Hrs  T(C): 35.9 (07 Sep 2017 06:00), Max: 37.6 (06 Sep 2017 20:59)  T(F): 96.7 (07 Sep 2017 06:00), Max: 99.6 (06 Sep 2017 20:59)  HR: 70 (07 Sep 2017 08:00) (67 - 99)  BP: 153/72 (07 Sep 2017 08:00) (138/82 - 207/100)  BP(mean): 92 (07 Sep 2017 08:00) (88 - 108)  RR: 18 (07 Sep 2017 08:00) (12 - 26)  SpO2: 98% (07 Sep 2017 08:00) (97% - 99%)   (if applicable)    GENERAL: The patient is a well-developed, well-nourished, in no apparent distress.     HEENT: Head is normocephalic and atraumatic. Extraocular muscles are intact. Mucous membranes are moist.     NECK: Supple, no palpable adenopathy.    LUNGS: Clear to auscultation, no wheezing, rales, or rhonchi.    HEART: Regular rate and rhythm without murmur.    ABDOMEN: Soft, nontender, and nondistended.  No hepatosplenomegaly is noted. no suprapubic or CVA tenderness.     EXTREMITIES: Without any cyanosis, clubbing, rash, lesions or edema.    NEUROLOGIC: Awake, alert, oriented.     SKIN: Warm, dry, good turgor.      LABS:                        11.7   10.2  )-----------( 194      ( 07 Sep 2017 06:38 )             36.3         140  |  108  |  42<H>  ----------------------------<  122<H>  4.1   |  21<L>  |  3.93<H>    Ca    8.6      07 Sep 2017 06:37  Phos  4.6       Mg     2.1         TPro  6.7  /  Alb  3.1<L>  /  TBili  0.6  /  DBili  x   /  AST  19  /  ALT  13  /  AlkPhos  69      PT/INR - ( 06 Sep 2017 16:27 )   PT: 12.3 sec;   INR: 1.13 ratio         PTT - ( 07 Sep 2017 07:05 )  PTT:64.8 sec  Urinalysis Basic - ( 06 Sep 2017 17:50 )    Color: Red / Appearance: x / S.010 / pH: x  Gluc: x / Ketone: Trace  / Bili: Negative / Urobili: Negative   Blood: x / Protein: 500 mg/dL / Nitrite: Positive   Leuk Esterase: Small / RBC: >50 /HPF / WBC 6-10 /HPF   Sq Epi: x / Non Sq Epi: x / Bacteria: Trace /HPF        CARDIAC MARKERS ( 07 Sep 2017 06:37 )  1.890 ng/mL / x     / 159 U/L / x     / 4.3 ng/mL  CARDIAC MARKERS ( 07 Sep 2017 00:39 )  1.180 ng/mL / x     / x     / x     / x      CARDIAC MARKERS ( 06 Sep 2017 19:06 )  0.193 ng/mL / x     / x     / x     / x      CARDIAC MARKERS ( 06 Sep 2017 17:56 )  0.120 ng/mL / x     / x     / x     / x            < from: CT Abdomen and Pelvis No Cont (17 @ 21:23) >  IMPRESSION:      Two RIGHT proximal ureteral calculi measuring 7 mm and 8 mm.   Mild RIGHT hydronephrosis.  Atrophic left kidney, possibly chronic.  Additional nonobstructive bilateral renal calculi.  Left ureterovesical junction calculi measuring up to 12 mm. No left   hydronephrosis.  Small right pleural effusion.    < end of copied text >        < from: US Renal (17 @ 16:50) >  IMPRESSION:   Bilateral renal calculi. Mild right-sided hydronephrosis. Urinary bladder   calculus.    < end of copied text >      < from: Xray Chest 1 View AP- PORTABLE-Urgent (17 @ 19:34) >  IMPRESSION:  Diffuse interstitial prominence of the right lung and the left lung base,   which may be due to asymmetric edema or infection.    < end of copied text >            MICROBIOLOGY: (if applicable)    RADIOLOGY & ADDITIONAL STUDIES:  EKG:   CXR:  ECHO:      RECOMMENDATIONS:

## 2017-09-07 NOTE — PROGRESS NOTE ADULT - SUBJECTIVE AND OBJECTIVE BOX
INTERVAL HPI/OVERNIGHT EVENTS:    Pt seen and examined at bedside. Admits to mild right flank pain, no abdominal pain, no nausea, vomiting. Admits to dysuria, hematuria. Refusing james.  Denies fever, chills, SOB or CP.      Vital Signs Last 24 Hrs  T(C): 35.9 (07 Sep 2017 06:00), Max: 37.6 (06 Sep 2017 20:59)  T(F): 96.7 (07 Sep 2017 06:00), Max: 99.6 (06 Sep 2017 20:59)  HR: 70 (07 Sep 2017 08:00) (67 - 99)  BP: 153/72 (07 Sep 2017 08:00) (138/82 - 207/100)  BP(mean): 92 (07 Sep 2017 08:00) (88 - 108)  RR: 18 (07 Sep 2017 08:00) (12 - 26)  SpO2: 98% (07 Sep 2017 08:00) (97% - 99%)  I&O's Detail    06 Sep 2017 07:01  -  07 Sep 2017 07:00  --------------------------------------------------------  IN:    heparin  Infusion.: 65 mL  Total IN: 65 mL    OUT:    Voided: 275 mL  Total OUT: 275 mL    Total NET: -210 mL        cefTRIAXone   IVPB 1 Gram(s) IV Intermittent every 24 hours  pantoprazole    Tablet 40 milliGRAM(s) Oral before breakfast      Physical Exam  General: AAOx3, No acute distress  Abdomen: obese, soft, nondistended, mild suprapubic TTP, no rebound tenderness, no guarding, no palpable masses, no CVA TTP b/l  : Normal external genitalia  Extremities: non edematous, no calf pain bilaterally      Labs:                        11.7   10.2  )-----------( 194      ( 07 Sep 2017 06:38 )             36.3     09-07    140  |  108  |  42<H>  ----------------------------<  122<H>  4.1   |  21<L>  |  3.93<H>    Ca    8.6      07 Sep 2017 06:37  Phos  4.6     09-07  Mg     2.1     09-07    TPro  6.7  /  Alb  3.1<L>  /  TBili  0.6  /  DBili  x   /  AST  19  /  ALT  13  /  AlkPhos  69  09-07    PT/INR - ( 06 Sep 2017 16:27 )   PT: 12.3 sec;   INR: 1.13 ratio         PTT - ( 07 Sep 2017 07:05 )  PTT:64.8 sec

## 2017-09-07 NOTE — PROGRESS NOTE ADULT - PROBLEM SELECTOR PLAN 1
Pt refusing james   C/w IV abx   Monitor Cr levels   Cardio/ medical clearance for poss cysto/ stent  Care as per ICU   Will follow

## 2017-09-07 NOTE — PROGRESS NOTE ADULT - ASSESSMENT
73 y/o Male w/ b/l renal and b/l ureteral calculi, mild right   hydroureteronephrosis, UTI, NSTEMI on hep gtt

## 2017-09-07 NOTE — PROGRESS NOTE ADULT - PROBLEM SELECTOR PLAN 3
LIN on CKD, last creatinine 2.1 in 2016  CT abdomen/pelvis: right hydronephrosis, right ureteral stone; bilateral renal calculi, left renal atrophy.  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  patient refused james  - urology consult tonight - Dr Irvin consulted

## 2017-09-07 NOTE — PROGRESS NOTE ADULT - PROBLEM SELECTOR PLAN 2
SBP 200s, responded to labetalol and lopressor IV  - NGT drip for 3 hour and switched to procardia and metoprolol.

## 2017-09-07 NOTE — CONSULT NOTE ADULT - PROBLEM SELECTOR RECOMMENDATION 9
-r/o secondary to rt.sided obstructive uropathy plus pre renal azotemia secondary to NSTEMI ? .Pts renal function is unchanged last 24 hrs  -Keep patient euvolemic and renal diet  -Avoid Nephrotoxic Meds/ Agents such as (NSAIDs, IV contrast, Aminoglycosides such as gentamicin, -Gadolinium contrast, Phosphate containing enemas, etc..)  -Adjust Medications according to eGFR  -f/u pth and serum uric acid

## 2017-09-07 NOTE — PROGRESS NOTE ADULT - SUBJECTIVE AND OBJECTIVE BOX
Please see the consult  and follow up notes from our service.  This patient has calculi in both ureters and kidneys, with minimal right hydronephrosis and no hydronephrosis seen in an atrophic left kidney.  The patient is not a candidate at this point from urologic stenting secondary to his acute and chronic medical issues.  In the past he had refused intervention and is now also refusing urethral catherization to better monitor his i/o status.    If clinically indicated, the interventional radiology staff can be consulted to consider nephrostomy tube placement.  I have discussed my recommendations with both the attending and resident ICU staff

## 2017-09-07 NOTE — PROGRESS NOTE ADULT - PROBLEM SELECTOR PLAN 1
patient presented with unstable angina in ED  TWI precordial and lateral leads, elevated troponin  currently, chest pain free after ASA, plavix loading, s/p NTG, heparin gtt  no beds in CCU and no cath tonight given naman on ckd  - trend troponin: last troponin 1.8.   - TTE in AM  - ASA, statin, BB, heparin gtt  - cardiology consult -Dr Briseno

## 2017-09-07 NOTE — PROGRESS NOTE ADULT - ASSESSMENT
73yo male, [place] speaking, wife is current patient in ICU, daughter at bedside to [facility], [facility] CAD s/p 12-14 stents (placed [id number]), DM, HTN, HLD, CVA (4 yrs ago, residual left sided facial weakness), lumbar radiculopathy with herniated disc, COPD (never smoker, passive smoke exposure), CKD (last creatinine 2 months ago was 1.2), bipolar disorder (no meds, sees psychiatrist), chronic kidney stones (has had ureteral stent in past) p/w 2 days intractable right flank pain radiating to groin, chills, vomiting 2/2 kidney stones/UTI, rule out pyelonephritis/obstruction.  Patient with NSTEMI with unstable angina and TWI lateral leads

## 2017-09-07 NOTE — CONSULT NOTE ADULT - SUBJECTIVE AND OBJECTIVE BOX
CHIEF COMPLAINT: Patient is a 72y old  Male who presents with a chief complaint of right flank pain, chest pain (06 Sep 2017 20:37)      HPI:  71yo male, American speaking, wife is current patient in ICU, daughter at bedside to [facility], [facility] CAD s/p 12-14 stents (placed [id number]), DM, HTN, HLD, CVA (4 yrs ago, residual left sided facial weakness), lumbar radiculopathy with herniated disc, COPD (never smoker, passive smoke exposure), CKD (last creatinine 2 months ago was 1.2), bipolar disorder (no meds, sees psychiatrist), chronic kidney stones (has had ureteral stent in past) p/w 2 days intractable right flank pain radiating to groin, chills, vomiting.  BIBA.  In ED, endorsed 10/10 substernal chest tightness with radiation to jaw.  ROS + chest pain at rest, CHAMPION after 2 steps, lower abdominal pain.  Denies fever, orthopnea, leg swelling, sore throat, diarrhea, dysuria, hematuria.  Last cardiac cath 2months ago, showed stent migration and stenosis, but patient refused stent.  Patient refusing james    In ED,  patient comfortable in stretcher  tmax 99 P 74 /100 RR 18 02 sat 98% RA  /77 after labetolol 10 IV and lopressor 5mg IV  s/p , plavix 300, NTG 0.4 SL  UA + WBC and RBC  Cr 3.55, renal ultrasound (06 Sep 2017 20:37)   Patient seen and examined.     PAST MEDICAL & SURGICAL HISTORY:  HLD (hyperlipidemia)  Anemia  Acute renal failure  CKD (chronic kidney disease)  COPD (chronic obstructive pulmonary disease)  Fainting  Cardiac arrhythmia  Dizziness  Hydronephrosis  Cholecystitis  Bipolar 1 disorder  DM (diabetes mellitus)  HTN (hypertension)  Lumbar radiculopathy  Left heart failure  Reflux esophagitis  Coronary atherosclerosis  No significant past surgical history      Allergies    No Known Allergies    Intolerances        MEDICATIONS  (STANDING):  aspirin  chewable 81 milliGRAM(s) Oral daily  cefTRIAXone   IVPB 1 Gram(s) IV Intermittent every 24 hours  buDESOnide  80 MICROgram(s)/formoterol 4.5 MICROgram(s) Inhaler 2 Puff(s) Inhalation two times a day  tamsulosin 0.4 milliGRAM(s) Oral at bedtime  pantoprazole    Tablet 40 milliGRAM(s) Oral before breakfast  metoprolol 50 milliGRAM(s) Oral two times a day  NIFEdipine XL 30 milliGRAM(s) Oral daily  atorvastatin 80 milliGRAM(s) Oral at bedtime  insulin lispro (HumaLOG) corrective regimen sliding scale   SubCutaneous Before meals and at bedtime  clopidogrel Tablet 75 milliGRAM(s) Oral daily  heparin  Infusion. 900 Unit(s)/Hr (9 mL/Hr) IV Continuous <Continuous>      MEDICATIONS  (PRN):  HYDROmorphone  Injectable 0.5 milliGRAM(s) IV Push every 3 hours PRN pain       Medications up to date at time of exam.    FAMILY HISTORY:      SOCIAL HISTORY  Smoking History: [   ] smoking/smoke exposure, [   ] former smoker  Living Condition: [   ] apartment, [   ] private house  Work History:   Travel History: denies recent travel  Illicit Substance Use: denies  Alcohol Use: denies    REVIEW OF SYSTEMS:    CONSTITUTIONAL:  denies fevers, chills, sweats, weight loss    HEENT:  denies diplopia or blurred vision, sore throat or runny nose.    CARDIOVASCULAR:  denies pressure, squeezing, tightness, or heaviness about the chest; no palpitations.    RESPIRATORY:  +SOB, cough, CHAMPION, wheezing.    GASTROINTESTINAL:  denies abdominal pain, nausea, vomiting or diarrhea.    GENITOURINARY: denies dysuria, frequency or urgency.    NEUROLOGIC:  denies numbness, tingling, seizures or weakness.    PSYCHIATRIC:  denies disorder of thought or mood.    MSK: denies swelling, redness      PHYSICAL EXAMINATION:    GENERAL: The patient is a well-developed, well-nourished, in no apparent distress.     Vital Signs Last 24 Hrs  T(C): 36.1 (07 Sep 2017 08:00), Max: 37.6 (06 Sep 2017 20:59)  T(F): 97 (07 Sep 2017 08:00), Max: 99.6 (06 Sep 2017 20:59)  HR: 71 (07 Sep 2017 12:00) (67 - 99)  BP: 158/93 (07 Sep 2017 12:00) (138/82 - 207/100)  BP(mean): 111 (07 Sep 2017 12:00) (87 - 111)  RR: 18 (07 Sep 2017 12:00) (12 - 26)  SpO2: 96% (07 Sep 2017 12:00) (96% - 100%)    HEENT: head is normocephalic and atraumatic. mucous membranes are moist.     NECK: supple, no palpable adenopathy.    LUNGS: clear to auscultation, no wheezing, rales, or rhonchi.    HEART: regular rate and rhythm + II/VI HSM.    ABDOMEN: soft, nontender, and nondistended.     EXTREMITIES: without any cyanosis, clubbing, rash, lesions or edema.    NEUROLOGIC: awake, alert, oriented.     SKIN: warm, dry, good turgor.      LABS:                        11.7   10.2  )-----------( 194      ( 07 Sep 2017 06:38 )             36.3     -07    140  |  108  |  42<H>  ----------------------------<  122<H>  4.1   |  21<L>  |  3.93<H>    Ca    8.6      07 Sep 2017 06:37  Phos  4.6       Mg     2.1         TPro  6.7  /  Alb  3.1<L>  /  TBili  0.6  /  DBili  x   /  AST  19  /  ALT  13  /  AlkPhos  69  -07    PT/INR - ( 06 Sep 2017 16:27 )   PT: 12.3 sec;   INR: 1.13 ratio         PTT - ( 07 Sep 2017 07:05 )  PTT:64.8 sec  Urinalysis Basic - ( 06 Sep 2017 17:50 )    Color: Red / Appearance: x / S.010 / pH: x  Gluc: x / Ketone: Trace  / Bili: Negative / Urobili: Negative   Blood: x / Protein: 500 mg/dL / Nitrite: Positive   Leuk Esterase: Small / RBC: >50 /HPF / WBC 6-10 /HPF   Sq Epi: x / Non Sq Epi: x / Bacteria: Trace /HPF        CARDIAC MARKERS ( 07 Sep 2017 06:37 )  1.890 ng/mL / x     / 159 U/L / x     / 4.3 ng/mL  CARDIAC MARKERS ( 07 Sep 2017 00:39 )  1.180 ng/mL / x     / x     / x     / x      CARDIAC MARKERS ( 06 Sep 2017 19:06 )  0.193 ng/mL / x     / x     / x     / x      CARDIAC MARKERS ( 06 Sep 2017 17:56 )  0.120 ng/mL / x     / x     / x     / x                    Troponin 1.890  @ 06:37   09-07 @ 06:37  CKMB --  @ 06:37  Troponin 1.180  @ 00:39  CK -- - @ 00:39  CKMB --  @ 00:39  Troponin 0.193  @ 19:06  CK -- - @ 19:06  CKMB --  @ 19:06  .    MICROBIOLOGY: (if applicable)    RADIOLOGY & ADDITIONAL STUDIES:  EKG: ischemia anterior leads  CXR:  ECHO:  TELE: SR w/ TWI    IMPRESSION: 72y Male PAST MEDICAL & SURGICAL HISTORY:  HLD (hyperlipidemia)  Anemia  Acute renal failure  CKD (chronic kidney disease)  COPD (chronic obstructive pulmonary disease)  Fainting  Cardiac arrhythmia  Dizziness  Hydronephrosis  Cholecystitis  Bipolar 1 disorder  DM (diabetes mellitus)  HTN (hypertension)  Lumbar radiculopathy  Left heart failure  Reflux esophagitis  Coronary atherosclerosis  No significant past surgical history     p/w CP, SOB due to NSTEMI. Pt w/ hx of 15 stents, refused stents 2 months ago.    RECOMMENDATIONS:    Discussed w/ patient's PMD from [facility] Dr. Moss, patient's baseline creatinine 2 months ago 2.5, has been as high as 4. Patient does not want any interventions at present time, medical mgmt for NSTEMI.    Echo pending

## 2017-09-08 LAB
APTT BLD: 67 SEC — HIGH (ref 27.5–37.4)
BASE EXCESS BLDA CALC-SCNC: -3.6 MMOL/L — LOW (ref -2–2)
BLOOD GAS COMMENTS ARTERIAL: SIGNIFICANT CHANGE UP
CALCIUM SERPL-MCNC: 8.5 MG/DL — SIGNIFICANT CHANGE UP (ref 8.4–10.5)
CK MB BLD-MCNC: 2.3 % — SIGNIFICANT CHANGE UP (ref 0–3.5)
CK MB CFR SERPL CALC: 2.2 NG/ML — SIGNIFICANT CHANGE UP (ref 0–3.6)
CK SERPL-CCNC: 94 U/L — SIGNIFICANT CHANGE UP (ref 35–232)
FERRITIN SERPL-MCNC: 88 NG/ML — SIGNIFICANT CHANGE UP (ref 30–400)
HCO3 BLDA-SCNC: 21 MMOL/L — LOW (ref 23–27)
HCT VFR BLD CALC: 32.2 % — LOW (ref 39–50)
HCT VFR BLD CALC: 34.7 % — LOW (ref 39–50)
HGB BLD-MCNC: 10.6 G/DL — LOW (ref 13–17)
HGB BLD-MCNC: 11.5 G/DL — LOW (ref 13–17)
HOROWITZ INDEX BLDA+IHG-RTO: 100 — SIGNIFICANT CHANGE UP
MCHC RBC-ENTMCNC: 28.2 PG — SIGNIFICANT CHANGE UP (ref 27–34)
MCHC RBC-ENTMCNC: 28.3 PG — SIGNIFICANT CHANGE UP (ref 27–34)
MCHC RBC-ENTMCNC: 32.9 GM/DL — SIGNIFICANT CHANGE UP (ref 32–36)
MCHC RBC-ENTMCNC: 33.3 GM/DL — SIGNIFICANT CHANGE UP (ref 32–36)
MCV RBC AUTO: 85.2 FL — SIGNIFICANT CHANGE UP (ref 80–100)
MCV RBC AUTO: 85.8 FL — SIGNIFICANT CHANGE UP (ref 80–100)
PCO2 BLDA: 36 MMHG — SIGNIFICANT CHANGE UP (ref 32–46)
PH BLDA: 7.37 — SIGNIFICANT CHANGE UP (ref 7.35–7.45)
PHOSPHATE 24H UR-MCNC: 45 MG/DL — SIGNIFICANT CHANGE UP
PLATELET # BLD AUTO: 198 K/UL — SIGNIFICANT CHANGE UP (ref 150–400)
PLATELET # BLD AUTO: 208 K/UL — SIGNIFICANT CHANGE UP (ref 150–400)
PO2 BLDA: 116 MMHG — HIGH (ref 74–108)
PTH-INTACT FLD-MCNC: 156 PG/ML — HIGH (ref 15–65)
RBC # BLD: 3.75 M/UL — LOW (ref 4.2–5.8)
RBC # BLD: 4.07 M/UL — LOW (ref 4.2–5.8)
RBC # FLD: 12.5 % — SIGNIFICANT CHANGE UP (ref 10.3–14.5)
RBC # FLD: 12.7 % — SIGNIFICANT CHANGE UP (ref 10.3–14.5)
SAO2 % BLDA: 98 % — HIGH (ref 92–96)
TROPONIN I SERPL-MCNC: 0.89 NG/ML — HIGH (ref 0–0.04)
URATE SERPL-MCNC: 8.7 MG/DL — SIGNIFICANT CHANGE UP (ref 3.4–8.8)
URATE UR-MCNC: 25.2 MG/DL — SIGNIFICANT CHANGE UP
WBC # BLD: 12.5 K/UL — HIGH (ref 3.8–10.5)
WBC # BLD: 12.8 K/UL — HIGH (ref 3.8–10.5)
WBC # FLD AUTO: 12.5 K/UL — HIGH (ref 3.8–10.5)
WBC # FLD AUTO: 12.8 K/UL — HIGH (ref 3.8–10.5)

## 2017-09-08 PROCEDURE — 71010: CPT | Mod: 26

## 2017-09-08 RX ORDER — NIFEDIPINE 30 MG
60 TABLET, EXTENDED RELEASE 24 HR ORAL DAILY
Qty: 0 | Refills: 0 | Status: DISCONTINUED | OUTPATIENT
Start: 2017-09-08 | End: 2017-09-08

## 2017-09-08 RX ORDER — ISOSORBIDE MONONITRATE 60 MG/1
30 TABLET, EXTENDED RELEASE ORAL DAILY
Qty: 0 | Refills: 0 | Status: DISCONTINUED | OUTPATIENT
Start: 2017-09-08 | End: 2017-09-08

## 2017-09-08 RX ORDER — LABETALOL HCL 100 MG
10 TABLET ORAL ONCE
Qty: 0 | Refills: 0 | Status: COMPLETED | OUTPATIENT
Start: 2017-09-08 | End: 2017-09-08

## 2017-09-08 RX ORDER — FUROSEMIDE 40 MG
80 TABLET ORAL ONCE
Qty: 0 | Refills: 0 | Status: COMPLETED | OUTPATIENT
Start: 2017-09-08 | End: 2017-09-08

## 2017-09-08 RX ORDER — NITROGLYCERIN 6.5 MG
10 CAPSULE, EXTENDED RELEASE ORAL
Qty: 50 | Refills: 0 | Status: DISCONTINUED | OUTPATIENT
Start: 2017-09-08 | End: 2017-09-10

## 2017-09-08 RX ORDER — METOPROLOL TARTRATE 50 MG
100 TABLET ORAL
Qty: 0 | Refills: 0 | Status: DISCONTINUED | OUTPATIENT
Start: 2017-09-08 | End: 2017-09-10

## 2017-09-08 RX ORDER — ISOSORBIDE MONONITRATE 60 MG/1
30 TABLET, EXTENDED RELEASE ORAL DAILY
Qty: 0 | Refills: 0 | Status: DISCONTINUED | OUTPATIENT
Start: 2017-09-08 | End: 2017-09-10

## 2017-09-08 RX ADMIN — CEFTRIAXONE 100 GRAM(S): 500 INJECTION, POWDER, FOR SOLUTION INTRAMUSCULAR; INTRAVENOUS at 18:52

## 2017-09-08 RX ADMIN — PANTOPRAZOLE SODIUM 40 MILLIGRAM(S): 20 TABLET, DELAYED RELEASE ORAL at 06:16

## 2017-09-08 RX ADMIN — CLOPIDOGREL BISULFATE 75 MILLIGRAM(S): 75 TABLET, FILM COATED ORAL at 11:42

## 2017-09-08 RX ADMIN — Medication 2: at 18:31

## 2017-09-08 RX ADMIN — Medication 3 MICROGRAM(S)/MIN: at 00:30

## 2017-09-08 RX ADMIN — Medication 80 MILLIGRAM(S): at 09:20

## 2017-09-08 RX ADMIN — Medication 10 MILLIGRAM(S): at 02:36

## 2017-09-08 RX ADMIN — BUDESONIDE AND FORMOTEROL FUMARATE DIHYDRATE 2 PUFF(S): 160; 4.5 AEROSOL RESPIRATORY (INHALATION) at 11:02

## 2017-09-08 RX ADMIN — BUDESONIDE AND FORMOTEROL FUMARATE DIHYDRATE 2 PUFF(S): 160; 4.5 AEROSOL RESPIRATORY (INHALATION) at 21:03

## 2017-09-08 RX ADMIN — HEPARIN SODIUM 900 UNIT(S)/HR: 5000 INJECTION INTRAVENOUS; SUBCUTANEOUS at 08:34

## 2017-09-08 RX ADMIN — Medication 100 MILLIGRAM(S): at 18:33

## 2017-09-08 RX ADMIN — ISOSORBIDE MONONITRATE 30 MILLIGRAM(S): 60 TABLET, EXTENDED RELEASE ORAL at 22:01

## 2017-09-08 RX ADMIN — Medication 2: at 06:29

## 2017-09-08 RX ADMIN — ATORVASTATIN CALCIUM 80 MILLIGRAM(S): 80 TABLET, FILM COATED ORAL at 21:03

## 2017-09-08 RX ADMIN — TAMSULOSIN HYDROCHLORIDE 0.4 MILLIGRAM(S): 0.4 CAPSULE ORAL at 21:03

## 2017-09-08 RX ADMIN — Medication 81 MILLIGRAM(S): at 11:42

## 2017-09-08 RX ADMIN — Medication 2: at 21:03

## 2017-09-08 RX ADMIN — HYDROMORPHONE HYDROCHLORIDE 0.5 MILLIGRAM(S): 2 INJECTION INTRAMUSCULAR; INTRAVENOUS; SUBCUTANEOUS at 13:47

## 2017-09-08 RX ADMIN — Medication 75 MILLIGRAM(S): at 06:16

## 2017-09-08 RX ADMIN — Medication 2: at 11:53

## 2017-09-08 RX ADMIN — HYDROMORPHONE HYDROCHLORIDE 0.5 MILLIGRAM(S): 2 INJECTION INTRAMUSCULAR; INTRAVENOUS; SUBCUTANEOUS at 14:18

## 2017-09-08 RX ADMIN — Medication 60 MILLIGRAM(S): at 06:16

## 2017-09-08 NOTE — PROGRESS NOTE ADULT - PROBLEM SELECTOR PLAN 4
fever, chills, vomiting, perinephric stranding on CT scan, + R CVAT  UA positive nitrite/LE/WBC  - ceftriaxone day 1  - follow up UCx and Bcx fever, chills, vomiting, perinephric stranding on CT scan, + R CVAT  UA positive nitrite/LE/WBC  - ceftriaxone   - follow up UCx and Bcx

## 2017-09-08 NOTE — PROGRESS NOTE ADULT - SUBJECTIVE AND OBJECTIVE BOX
INTERVAL HPI/OVERNIGHT EVENTS: Patient got more SOB overnight and CXR was repeated        Antimicrobial:  cefTRIAXone   IVPB 1 Gram(s) IV Intermittent every 24 hours    Cardiovascular:  tamsulosin 0.4 milliGRAM(s) Oral at bedtime  metoprolol 75 milliGRAM(s) Oral two times a day  nitroglycerin  Infusion 10 MICROgram(s)/Min IV Continuous <Continuous>  NIFEdipine XL 60 milliGRAM(s) Oral daily    Pulmonary:  buDESOnide  80 MICROgram(s)/formoterol 4.5 MICROgram(s) Inhaler 2 Puff(s) Inhalation two times a day    Hematalogic:  aspirin  chewable 81 milliGRAM(s) Oral daily  clopidogrel Tablet 75 milliGRAM(s) Oral daily  heparin  Infusion. 900 Unit(s)/Hr IV Continuous <Continuous>    Other:  HYDROmorphone  Injectable 0.5 milliGRAM(s) IV Push every 3 hours PRN  pantoprazole    Tablet 40 milliGRAM(s) Oral before breakfast  atorvastatin 80 milliGRAM(s) Oral at bedtime  insulin lispro (HumaLOG) corrective regimen sliding scale   SubCutaneous Before meals and at bedtime    HYDROmorphone  Injectable 0.5 milliGRAM(s) IV Push every 3 hours PRN  aspirin  chewable 81 milliGRAM(s) Oral daily  cefTRIAXone   IVPB 1 Gram(s) IV Intermittent every 24 hours  buDESOnide  80 MICROgram(s)/formoterol 4.5 MICROgram(s) Inhaler 2 Puff(s) Inhalation two times a day  tamsulosin 0.4 milliGRAM(s) Oral at bedtime  pantoprazole    Tablet 40 milliGRAM(s) Oral before breakfast  atorvastatin 80 milliGRAM(s) Oral at bedtime  insulin lispro (HumaLOG) corrective regimen sliding scale   SubCutaneous Before meals and at bedtime  clopidogrel Tablet 75 milliGRAM(s) Oral daily  heparin  Infusion. 900 Unit(s)/Hr IV Continuous <Continuous>  metoprolol 75 milliGRAM(s) Oral two times a day  nitroglycerin  Infusion 10 MICROgram(s)/Min IV Continuous <Continuous>  NIFEdipine XL 60 milliGRAM(s) Oral daily    Drug Dosing Weight  Height (cm): 172.72 (06 Sep 2017 21:30)  Weight (kg): 88.7 (06 Sep 2017 21:30)  BMI (kg/m2): 29.7 (06 Sep 2017 21:30)  BSA (m2): 2.02 (06 Sep 2017 21:30)    CENTRAL LINE: [ ] YES [ ] NO  LOCATION:   DATE INSERTED:  REMOVE: [ ] YES [ ] NO  EXPLAIN:    CERVANTES: [ ] YES [ ] NO    DATE INSERTED:  REMOVE:  [ ] YES [ ] NO  EXPLAIN:    A-LINE:  [ ] YES [ ] NO  LOCATION:   DATE INSERTED:  REMOVE:  [ ] YES [ ] NO  EXPLAIN:    PMH -reviewed admission note, no change since admission  Heart faliure: acute [ ] chronic [ ] acute or chronic [ ] diastolic [ ] systolic [ ] combied systolic and diastolic[ ]  LIN: ATN[ ] renal medullary necrosis [ ] CKD I [ ]CKDII [ ]CKD III [ ]CKD IV [ ]CKD V [ ]Other pathological lesions [ ]  Abdominal Nutrition Status: malnutrition [ ] cachexia [ ] morbid obesity/BMI=40 [ ] Supplement ordered [___________]     ICU Vital Signs Last 24 Hrs  T(C): 36.6 (08 Sep 2017 08:00), Max: 36.8 (07 Sep 2017 23:00)  T(F): 97.8 (08 Sep 2017 08:00), Max: 98.3 (07 Sep 2017 23:00)  HR: 85 (08 Sep 2017 08:30) (67 - 99)  BP: 149/90 (08 Sep 2017 08:30) (144/69 - 179/90)  BP(mean): 105 (08 Sep 2017 08:30) (79 - 112)  ABP: --  ABP(mean): --  RR: 23 (08 Sep 2017 08:30) (13 - 37)  SpO2: 91% (08 Sep 2017 08:30) (86% - 100%)      ABG - ( 08 Sep 2017 04:51 )  pH: 7.37  /  pCO2: 36    /  pO2: 116   / HCO3: 21    / Base Excess: -3.6  /  SaO2: 98                     @ 07:01  -   @ 07:00  --------------------------------------------------------  IN: 922 mL / OUT: 1255 mL / NET: -333 mL          PHYSICAL EXAM:    GENERAL: [x ]NAD, [x ]well-groomed, [x ]well-developed  HEAD:  [ x]Atraumatic, [ x]Normocephalic  EYES: [x ]EOMI, [x ]PERRLA, [ ]conjunctiva and sclera clear  ENMT: [x ]No tonsillar erythema, exudates, or enlargement; [ ]Moist mucous membranes, [ ]Good dentition, [ ]No lesions  NECK: [ x]Supple, normal appearance, [ ]No JVD; [ ]Normal thyroid; [ ]Trachea midline  NERVOUS SYSTEM:  [x ]Alert & Oriented X3, [x ]Good concentration; [ ]Motor Strength 5/5 B/L upper and lower extremities; [ ]DTRs 2+ intact and symmetric  CHEST/LUNG: [x ]No chest deformity; [ ]Normal percussion bilaterally; [ ]No rales, rhonchi, wheezing   HEART: [x ]Regular rate and rhythm; [ ]No murmurs, rubs, or gallops  ABDOMEN: [x ]Soft, Nontender, Nondistended; [ ]Bowel sounds present  EXTREMITIES:  [ x]2+ Peripheral Pulses, [ ]No clubbing, cyanosis, or edema  LYMPH: [x ]No lymphadenopathy noted  SKIN: [ x]No rashes or lesions; [ ]Good capillary refill          LABS:  CBC Full  -  ( 08 Sep 2017 06:32 )  WBC Count : 12.5 K/uL  Hemoglobin : 11.5 g/dL  Hematocrit : 34.7 %  Platelet Count - Automated : 208 K/uL  Mean Cell Volume : 85.2 fl  Mean Cell Hemoglobin : 28.3 pg  Mean Cell Hemoglobin Concentration : 33.3 gm/dL        140  |  108  |  43<H>  ----------------------------<  145<H>  4.4   |  22  |  3.92<H>    Ca    9.3      07 Sep 2017 14:03  Phos  4.6       Mg     2.1         TPro  6.7  /  Alb  3.1<L>  /  TBili  0.6  /  DBili  x   /  AST  19  /  ALT  13  /  AlkPhos  69      PT/INR - ( 07 Sep 2017 14:03 )   PT: 12.6 sec;   INR: 1.15 ratio         PTT - ( 08 Sep 2017 06:31 )  PTT:67.0 sec  Urinalysis Basic - ( 07 Sep 2017 19:24 )    Color: Yellow / Appearance: Clear / S.020 / pH: x  Gluc: x / Ketone: Trace  / Bili: Negative / Urobili: Negative   Blood: x / Protein: 100 / Nitrite: Negative   Leuk Esterase: Small / RBC: >50 /HPF / WBC 6-10 /HPF   Sq Epi: x / Non Sq Epi: Few / Bacteria: Moderate /HPF          RADIOLOGY & ADDITIONAL STUDIES REVIEWED:  ***    [ ]GOALS OF CARE DISCUSSION WITH PATIENT/FAMILY/PROXY:    CRITICAL CARE TIME SPENT: 35 minutes INTERVAL HPI/OVERNIGHT EVENTS: Patient got more SOB overnight and CXR was repeated. Today patient was saturating 90% on venti mask and tapered down to Nasal cannula and breathing comfortably.         Antimicrobial:  cefTRIAXone   IVPB 1 Gram(s) IV Intermittent every 24 hours    Cardiovascular:  tamsulosin 0.4 milliGRAM(s) Oral at bedtime  metoprolol 75 milliGRAM(s) Oral two times a day  nitroglycerin  Infusion 10 MICROgram(s)/Min IV Continuous <Continuous>  NIFEdipine XL 60 milliGRAM(s) Oral daily    Pulmonary:  buDESOnide  80 MICROgram(s)/formoterol 4.5 MICROgram(s) Inhaler 2 Puff(s) Inhalation two times a day    Hematalogic:  aspirin  chewable 81 milliGRAM(s) Oral daily  clopidogrel Tablet 75 milliGRAM(s) Oral daily  heparin  Infusion. 900 Unit(s)/Hr IV Continuous <Continuous>    Other:  HYDROmorphone  Injectable 0.5 milliGRAM(s) IV Push every 3 hours PRN  pantoprazole    Tablet 40 milliGRAM(s) Oral before breakfast  atorvastatin 80 milliGRAM(s) Oral at bedtime  insulin lispro (HumaLOG) corrective regimen sliding scale   SubCutaneous Before meals and at bedtime    HYDROmorphone  Injectable 0.5 milliGRAM(s) IV Push every 3 hours PRN  aspirin  chewable 81 milliGRAM(s) Oral daily  cefTRIAXone   IVPB 1 Gram(s) IV Intermittent every 24 hours  buDESOnide  80 MICROgram(s)/formoterol 4.5 MICROgram(s) Inhaler 2 Puff(s) Inhalation two times a day  tamsulosin 0.4 milliGRAM(s) Oral at bedtime  pantoprazole    Tablet 40 milliGRAM(s) Oral before breakfast  atorvastatin 80 milliGRAM(s) Oral at bedtime  insulin lispro (HumaLOG) corrective regimen sliding scale   SubCutaneous Before meals and at bedtime  clopidogrel Tablet 75 milliGRAM(s) Oral daily  heparin  Infusion. 900 Unit(s)/Hr IV Continuous <Continuous>  metoprolol 75 milliGRAM(s) Oral two times a day  nitroglycerin  Infusion 10 MICROgram(s)/Min IV Continuous <Continuous>  NIFEdipine XL 60 milliGRAM(s) Oral daily    Drug Dosing Weight  Height (cm): 172.72 (06 Sep 2017 21:30)  Weight (kg): 88.7 (06 Sep 2017 21:30)  BMI (kg/m2): 29.7 (06 Sep 2017 21:30)  BSA (m2): 2.02 (06 Sep 2017 21:30)    CENTRAL LINE: [ ] YES [x ] NO  LOCATION:   DATE INSERTED:  REMOVE: [ ] YES [ ] NO  EXPLAIN:    CERVANTES: [x ] YES [ ] NO    DATE INSERTED:  REMOVE:  [ ] YES [ ] NO  EXPLAIN:    A-LINE:  [ ] YES [x ] NO  LOCATION:   DATE INSERTED:  REMOVE:  [ ] YES [ ] NO  EXPLAIN:    PMH -reviewed admission note, no change since admission  Heart faliure: acute [ ] chronic [ ] acute or chronic [ ] diastolic [ ] systolic [ ] combied systolic and diastolic[ ]  LIN: ATN[ ] renal medullary necrosis [ ] CKD I [ ]CKDII [ ]CKD III [ ]CKD IV [ ]CKD V [ ]Other pathological lesions [ ]  Abdominal Nutrition Status: malnutrition [ ] cachexia [ ] morbid obesity/BMI=40 [ ] Supplement ordered [___________]     ICU Vital Signs Last 24 Hrs  T(C): 36.6 (08 Sep 2017 08:00), Max: 36.8 (07 Sep 2017 23:00)  T(F): 97.8 (08 Sep 2017 08:00), Max: 98.3 (07 Sep 2017 23:00)  HR: 85 (08 Sep 2017 08:30) (67 - 99)  BP: 149/90 (08 Sep 2017 08:30) (144/69 - 179/90)  BP(mean): 105 (08 Sep 2017 08:30) (79 - 112)  ABP: --  ABP(mean): --  RR: 23 (08 Sep 2017 08:30) (13 - 37)  SpO2: 91% (08 Sep 2017 08:30) (86% - 100%)      ABG - ( 08 Sep 2017 04:51 )  pH: 7.37  /  pCO2: 36    /  pO2: 116   / HCO3: 21    / Base Excess: -3.6  /  SaO2: 98                     @ 07:01  -   @ 07:00  --------------------------------------------------------  IN: 922 mL / OUT: 1255 mL / NET: -333 mL          PHYSICAL EXAM:    GENERAL: [x ]NAD, [x ]well-groomed, [x ]well-developed  HEAD:  [ x]Atraumatic, [ x]Normocephalic  EYES: [x ]EOMI, [x ]PERRLA, [ ]conjunctiva and sclera clear  ENMT: [x ]No tonsillar erythema, exudates, or enlargement; [ ]Moist mucous membranes, [ ]Good dentition, [ ]No lesions  NECK: [ x]Supple, normal appearance, [ ]No JVD; [ ]Normal thyroid; [ ]Trachea midline  NERVOUS SYSTEM:  [x ]Alert & Oriented X3, [x ]Good concentration; [ ]Motor Strength 5/5 B/L upper and lower extremities; [ ]DTRs 2+ intact and symmetric  CHEST/LUNG: [x ]No chest deformity; [ ]Normal percussion bilaterally; [ ]No rales, rhonchi, wheezing   HEART: [x ]Regular rate and rhythm; [ ]No murmurs, rubs, or gallops  ABDOMEN: [x ]Soft, Nontender, Nondistended; [ ]Bowel sounds present  EXTREMITIES:  [ x]2+ Peripheral Pulses, [ ]No clubbing, cyanosis, or edema  LYMPH: [x ]No lymphadenopathy noted  SKIN: [ x]No rashes or lesions; [ ]Good capillary refill          LABS:  CBC Full  -  ( 08 Sep 2017 06:32 )  WBC Count : 12.5 K/uL  Hemoglobin : 11.5 g/dL  Hematocrit : 34.7 %  Platelet Count - Automated : 208 K/uL  Mean Cell Volume : 85.2 fl  Mean Cell Hemoglobin : 28.3 pg  Mean Cell Hemoglobin Concentration : 33.3 gm/dL        140  |  108  |  43<H>  ----------------------------<  145<H>  4.4   |  22  |  3.92<H>    Ca    9.3      07 Sep 2017 14:03  Phos  4.6       Mg     2.1         TPro  6.7  /  Alb  3.1<L>  /  TBili  0.6  /  DBili  x   /  AST  19  /  ALT  13  /  AlkPhos  69      PT/INR - ( 07 Sep 2017 14:03 )   PT: 12.6 sec;   INR: 1.15 ratio         PTT - ( 08 Sep 2017 06:31 )  PTT:67.0 sec  Urinalysis Basic - ( 07 Sep 2017 19:24 )    Color: Yellow / Appearance: Clear / S.020 / pH: x  Gluc: x / Ketone: Trace  / Bili: Negative / Urobili: Negative   Blood: x / Protein: 100 / Nitrite: Negative   Leuk Esterase: Small / RBC: >50 /HPF / WBC 6-10 /HPF   Sq Epi: x / Non Sq Epi: Few / Bacteria: Moderate /HPF          RADIOLOGY & ADDITIONAL STUDIES REVIEWED:  ***    [ ]GOALS OF CARE DISCUSSION WITH PATIENT/FAMILY/PROXY:    CRITICAL CARE TIME SPENT: 35 minutes

## 2017-09-08 NOTE — PROGRESS NOTE ADULT - SUBJECTIVE AND OBJECTIVE BOX
Patient seen and examined.   feels okay.   c/o left flank pain and mild dysuria.     MEDICATIONS  (STANDING):  aspirin  chewable 81 milliGRAM(s) Oral daily  cefTRIAXone   IVPB 1 Gram(s) IV Intermittent every 24 hours  buDESOnide  80 MICROgram(s)/formoterol 4.5 MICROgram(s) Inhaler 2 Puff(s) Inhalation two times a day  tamsulosin 0.4 milliGRAM(s) Oral at bedtime  pantoprazole    Tablet 40 milliGRAM(s) Oral before breakfast  atorvastatin 80 milliGRAM(s) Oral at bedtime  insulin lispro (HumaLOG) corrective regimen sliding scale   SubCutaneous Before meals and at bedtime  clopidogrel Tablet 75 milliGRAM(s) Oral daily  heparin  Infusion. 900 Unit(s)/Hr (9 mL/Hr) IV Continuous <Continuous>  metoprolol 75 milliGRAM(s) Oral two times a day  nitroglycerin  Infusion 10 MICROgram(s)/Min (3 mL/Hr) IV Continuous <Continuous>  NIFEdipine XL 60 milliGRAM(s) Oral daily  isosorbide   mononitrate ER Tablet (IMDUR) 30 milliGRAM(s) Oral daily      MEDICATIONS  (PRN):  HYDROmorphone  Injectable 0.5 milliGRAM(s) IV Push every 3 hours PRN pain       Medications up to date at time of exam.      PHYSICAL EXAMINATION:      Vital Signs Last 24 Hrs  T(C): 37.1 (08 Sep 2017 12:00), Max: 37.1 (08 Sep 2017 12:00)  T(F): 98.7 (08 Sep 2017 12:00), Max: 98.7 (08 Sep 2017 12:00)  HR: 87 (08 Sep 2017 12:30) (73 - 99)  BP: 143/66 (08 Sep 2017 12:30) (134/70 - 179/90)  BP(mean): 85 (08 Sep 2017 12:30) (79 - 112)  RR: 15 (08 Sep 2017 12:30) (14 - 37)  SpO2: 94% (08 Sep 2017 12:30) (86% - 100%)   (if applicable)    GENERAL: The patient is a well-developed, well-nourished, in no apparent distress.     HEENT: Head is normocephalic and atraumatic. Extraocular muscles are intact. Mucous membranes are moist.     NECK: Supple, no palpable adenopathy.    LUNGS: Clear to auscultation, no wheezing, rales, or rhonchi.    HEART: Regular rate and rhythm without murmur.    ABDOMEN: Soft, nontender, and nondistended.  No hepatosplenomegaly is noted. right suprapubic tendermness    EXTREMITIES: Without any cyanosis, clubbing, rash, lesions or edema.    NEUROLOGIC: Awake, alert, oriented.     SKIN: Warm, dry, good turgor.      LABS:                        11.5   12.5  )-----------( 208      ( 08 Sep 2017 06:32 )             34.7         137  |  106  |  55<H>  ----------------------------<  161<H>  4.3   |  20<L>  |  4.96<H>    Ca    8.7      08 Sep 2017 06:31  Phos  4.6       Mg     2.1         TPro  6.7  /  Alb  3.1<L>  /  TBili  0.6  /  DBili  x   /  AST  19  /  ALT  13  /  AlkPhos  69  -07    PT/INR - ( 07 Sep 2017 14:03 )   PT: 12.6 sec;   INR: 1.15 ratio         PTT - ( 08 Sep 2017 06:31 )  PTT:67.0 sec  Urinalysis Basic - ( 07 Sep 2017 19:24 )    Color: Yellow / Appearance: Clear / S.020 / pH: x  Gluc: x / Ketone: Trace  / Bili: Negative / Urobili: Negative   Blood: x / Protein: 100 / Nitrite: Negative   Leuk Esterase: Small / RBC: >50 /HPF / WBC 6-10 /HPF   Sq Epi: x / Non Sq Epi: Few / Bacteria: Moderate /HPF      ABG - ( 08 Sep 2017 04:51 )  pH: 7.37  /  pCO2: 36    /  pO2: 116   / HCO3: 21    / Base Excess: -3.6  /  SaO2: 98                CARDIAC MARKERS ( 07 Sep 2017 14:03 )  1.510 ng/mL / x     / 175 U/L / x     / 4.3 ng/mL  CARDIAC MARKERS ( 07 Sep 2017 06:37 )  1.890 ng/mL / x     / 159 U/L / x     / 4.3 ng/mL  CARDIAC MARKERS ( 07 Sep 2017 00:39 )  1.180 ng/mL / x     / x     / x     / x      CARDIAC MARKERS ( 06 Sep 2017 19:06 )  0.193 ng/mL / x     / x     / x     / x      CARDIAC MARKERS ( 06 Sep 2017 17:56 )  0.120 ng/mL / x     / x     / x     / x            Culture - Blood (17 @ 10:02)    Specimen Source: .Blood Blood-Peripheral    Culture Results:   No growth to date.    Culture - Urine (17 @ 23:53)    Specimen Source: .Urine Clean Catch (Midstream)    Culture Results:   No growth    < from: CT Abdomen and Pelvis No Cont (17 @ 21:23) >  IMPRESSION:      Two RIGHT proximal ureteral calculi measuring 7 mm and 8 mm.   Mild RIGHT hydronephrosis.  Atrophic left kidney, possibly chronic.  Additional nonobstructive bilateral renal calculi.  Left ureterovesical junction calculi measuring up to 12 mm. No left   hydronephrosis.  Small right pleural effusion.    < end of copied text >    < from: Xray Chest 1 View AP- PORTABLE-Urgent (17 @ 20:15) >  Impression: New airspace opacification in the right mid to lower lung   zone for which clinical correlation with pneumonia is recommended.    No gross pleural effusion or pneumothorax. Skinfold on the left.    Stable cardiac silhouette.    < end of copied text >    < from: Transthoracic Echocardiogram (17 @ 06:51) >  CONCLUSIONS:  1. Mitral annular calcification, and calcified mitral  leaflets with normal diastolic opening. Mild mitral  regurgitation.  2. Aortic valve leaflet morphology not well visualized,  appears calcified with reduced opening. Peak transaortic  valve gradient equals 19 mm Hg, mean transaortic valve  gradient equals 10 mm Hg, consistent with mild aortic  stenosis. Trace aortic regurgitation.  3. Normal left ventricular internal dimensions and wall  thicknesses.  4. Severe segmental left ventricular dysfunction.  The mid  to distal anterior wall, distal inferior wall, anteroseptum  and apex are akinetic. Moderate diastolic dysfunction  (Stage II).  5. Normal right ventricular size and function.  6. RVS Pressure is 46 mm Hg.  Mild pulmonary hypertension.    < end of copied text >    culture  MICROBIOLOGY: (if applicable)    RADIOLOGY & ADDITIONAL STUDIES:  EKG:   CXR:  ECHO:      RECOMMENDATIONS:

## 2017-09-08 NOTE — PROGRESS NOTE ADULT - ASSESSMENT
73yo male, Citizen of [place] speaking, wife is current patient in ICU, daughter at bedside to [facility], [facility] CAD s/p 12-14 stents (placed [phone]), DM, HTN, HLD, CVA (4 yrs ago, residual left sided facial weakness), lumbar radiculopathy with herniated disc, COPD (never smoker, passive smoke exposure), CKD (last creatinine 2 months ago was 1.2), bipolar disorder (no meds, sees psychiatrist), chronic kidney stones (has had ureteral stent in past) p/w 2 days intractable right flank pain radiating to groin, chills, vomiting 2/2 kidney stones/UTI, rule out pyelonephritis/obstruction.  Patient with NSTEMI with unstable angina and TWI lateral leads

## 2017-09-08 NOTE — PROGRESS NOTE ADULT - ASSESSMENT
73yo male, [place] Islander speaking, wife is current patient in ICU, daughter at bedside to [facility], [facility] CAD s/p 12-14 stents (placed [phone]), DM, HTN, HLD, CVA (4 yrs ago, residual left sided facial weakness), lumbar radiculopathy with herniated disc, COPD (never smoker, passive smoke exposure), CKD (last creatinine 2 months ago was 1.2), bipolar disorder (no meds, sees psychiatrist), chronic kidney stones (has had ureteral stent in past) p/w 2 days intractable right flank pain radiating to groin, chills, vomiting.  BIBA.    ID is consulted for treatment of pyelonephritis.     Impression and plan :   1.complicated UTI with b/l U stones and mild right hydronephrosis. u/a grossly positive, nitrite +, likley G -ve infection.     Patient was started on ceftriaxone and responded well. Afebrile, hemodynamically stable, WBC trended down and no hx of MDR UTI in past.     ·	continue ceftriaxone.   ·	urology follow up for need of cystoscopy and stone removal    2. LIN on CKD:   follow up I and O   daily BMP    3. Positive troponins, hxof CAD:   management as per ICU and cards.     4. ? pneumonia on CXR: continue ceftriaxone.     Case discussed in detail with the primary team.  Thanks for this consultation. please call me if any questions at [phone]. 73yo male, [place] speaking, wife is current patient in ICU, daughter at bedside to [facility], [facility] CAD s/p 12-14 stents (placed [phone]), DM, HTN, HLD, CVA (4 yrs ago, residual left sided facial weakness), lumbar radiculopathy with herniated disc, COPD (never smoker, passive smoke exposure), CKD (last creatinine 2 months ago was 1.2), bipolar disorder (no meds, sees psychiatrist), chronic kidney stones (has had ureteral stent in past) p/w 2 days intractable right flank pain radiating to groin, chills, vomiting.  BIBA.    ID is consulted for treatment of pyelonephritis.     Impression and plan :   1.complicated UTI with b/l U stones and mild right hydronephrosis. u/a grossly positive, nitrite +, likley G -ve infection.     Patient was started on ceftriaxone and responded well. Afebrile, hemodynamically stable, WBC trended down and no hx of MDR UTI in past.     ·	continue ceftriaxone.   ·	urology follow up for need of cystoscopy and stone removal    2. LIN on CKD:   follow up I and O   daily BMP    3. Positive troponins, hxof CAD:   management as per ICU and cards.     4. ? pneumonia on CXR: possibly pul edema in setting of CHF. pt on diuresis.   can follow CXR after diuresis.    Case discussed in detail with the primary team.  Thanks for this consultation. please call me if any questions at [phone].

## 2017-09-08 NOTE — PROGRESS NOTE ADULT - SUBJECTIVE AND OBJECTIVE BOX
Patient seen and examined.   Time of visit:    MEDICATIONS  (STANDING):  aspirin  chewable 81 milliGRAM(s) Oral daily  cefTRIAXone   IVPB 1 Gram(s) IV Intermittent every 24 hours  buDESOnide  80 MICROgram(s)/formoterol 4.5 MICROgram(s) Inhaler 2 Puff(s) Inhalation two times a day  tamsulosin 0.4 milliGRAM(s) Oral at bedtime  pantoprazole    Tablet 40 milliGRAM(s) Oral before breakfast  atorvastatin 80 milliGRAM(s) Oral at bedtime  insulin lispro (HumaLOG) corrective regimen sliding scale   SubCutaneous Before meals and at bedtime  clopidogrel Tablet 75 milliGRAM(s) Oral daily  heparin  Infusion. 900 Unit(s)/Hr (9 mL/Hr) IV Continuous <Continuous>  metoprolol 75 milliGRAM(s) Oral two times a day  nitroglycerin  Infusion 10 MICROgram(s)/Min (3 mL/Hr) IV Continuous <Continuous>  isosorbide   mononitrate ER Tablet (IMDUR) 30 milliGRAM(s) Oral daily      MEDICATIONS  (PRN):  HYDROmorphone  Injectable 0.5 milliGRAM(s) IV Push every 3 hours PRN pain   Medications up to date at time of exam.      PHYSICAL EXAMINATION:  Patient has no new complaints.  GENERAL: The patient is a well-developed, well-nourished, in no apparent distress.     Vital Signs Last 24 Hrs  T(C): 37.1 (08 Sep 2017 12:00), Max: 37.1 (08 Sep 2017 12:00)  T(F): 98.7 (08 Sep 2017 12:00), Max: 98.7 (08 Sep 2017 12:00)  HR: 87 (08 Sep 2017 12:30) (73 - 99)  BP: 143/66 (08 Sep 2017 12:30) (134/70 - 179/90)  BP(mean): 85 (08 Sep 2017 12:30) (79 - 112)  RR: 15 (08 Sep 2017 12:30) (15 - 37)  SpO2: 94% (08 Sep 2017 12:30) (86% - 100%)   (if applicable)      HEENT: Head is normocephalic and atraumatic.     NECK: Supple, no palpable adenopathy.    LUNGS: Clear to auscultation, no wheezing, rales, or rhonchi.    HEART: Regular rate and rhythm + II/VI HSM.    ABDOMEN: Soft, nontender, and nondistended.  No hepatosplenomegaly is noted.    EXTREMITIES: Without any cyanosis, clubbing, rash, lesions or edema.    NEUROLOGIC: Awake, alert.    SKIN: Warm, dry, good turgor.      LABS:                        11.5   12.5  )-----------( 208      ( 08 Sep 2017 06:32 )             34.7         137  |  106  |  55<H>  ----------------------------<  161<H>  4.3   |  20<L>  |  4.96<H>    Ca    8.7      08 Sep 2017 06:31  Phos  4.6       Mg     2.1         TPro  6.7  /  Alb  3.1<L>  /  TBili  0.6  /  DBili  x   /  AST  19  /  ALT  13  /  AlkPhos  69  07    PT/INR - ( 07 Sep 2017 14:03 )   PT: 12.6 sec;   INR: 1.15 ratio         PTT - ( 08 Sep 2017 06:31 )  PTT:67.0 sec  Urinalysis Basic - ( 07 Sep 2017 19:24 )    Color: Yellow / Appearance: Clear / S.020 / pH: x  Gluc: x / Ketone: Trace  / Bili: Negative / Urobili: Negative   Blood: x / Protein: 100 / Nitrite: Negative   Leuk Esterase: Small / RBC: >50 /HPF / WBC 6-10 /HPF   Sq Epi: x / Non Sq Epi: Few / Bacteria: Moderate /HPF      ABG - ( 08 Sep 2017 04:51 )  pH: 7.37  /  pCO2: 36    /  pO2: 116   / HCO3: 21    / Base Excess: -3.6  /  SaO2: 98                CARDIAC MARKERS ( 07 Sep 2017 14:03 )  1.510 ng/mL / x     / 175 U/L / x     / 4.3 ng/mL  CARDIAC MARKERS ( 07 Sep 2017 06:37 )  1.890 ng/mL / x     / 159 U/L / x     / 4.3 ng/mL  CARDIAC MARKERS ( 07 Sep 2017 00:39 )  1.180 ng/mL / x     / x     / x     / x      CARDIAC MARKERS ( 06 Sep 2017 19:06 )  0.193 ng/mL / x     / x     / x     / x      CARDIAC MARKERS ( 06 Sep 2017 17:56 )  0.120 ng/mL / x     / x     / x     / x                    MICROBIOLOGY: (if applicable)    RADIOLOGY & ADDITIONAL STUDIES:  EKG:   CXR:  ECHO:  < from: Transthoracic Echocardiogram (17 @ 06:51) >  Patient name: JIMI BUCHANAN  YOB: 1945   Age: 72 (M)   MR#: [id number]  Study Date: 2017  Location: [place] [id number]: Jimi Nguyen [facility]  Study quality: Fair  Referring Physician:  JAMILA LOVELACE MD  Blood Pressure: 153/72 mmHg  Height: 172 cm  Weight: 88 kg  BSA: 2 m2  ------------------------------------------------------------------------    PROCEDURE: Transthoracic echocardiogram with 2-D, M-Mode  and complete spectral and color flow Doppler.  INDICATION: Unstable angina (I20.0)  ------------------------------------------------------------------------  DIMENSIONS:  Dimensions:     Normal Values:  LA:     3.1 cm    2.0 - 4.0 cm  Ao:     3.1 cm    2.0 - 3.8 cm  SEPTUM: 1.0 cm    0.6 - 1.2 cm  PWT:    0.8 cm    0.6 - 1.1 cm  LVIDd:  4.9 cm    3.0 - 5.6 cm  LVIDs:  4.2 cm    1.8 - 4.0 cm      Derived Variables:  LVMI: 76 g/m2  RWT: 0.32  Ejection Fraction Visual Estimate: 30-35 %  Peak Velocity (m/sec): AoV=2.2  ------------------------------------------------------------------------  OBSERVATIONS:  Mitral Valve: Mitral annular calcification, and calcified  mitral leaflets with normal diastolic opening. Mild mitral  regurgitation.  Aortic Root: Normal aortic root.  Aortic Valve: Aortic valve leaflet morphology not well  visualized, appears calcified with reduced opening. Peak  transaortic valve gradient equals 19 mm Hg, mean  transaortic valve gradient equals 10 mm Hg, consistent with  mild aortic stenosis. Trace aortic regurgitation.  Left Atrium: Normal left atrium.  Left Ventricle: Severe segmental left ventricular  dysfunction.  The mid to distal anterior wall, distal  inferior wall, anteroseptum and apex are akinetic. Moderate  diastolic dysfunction (Stage II). Normal left ventricular  internal dimensions and wall thicknesses.  Right Heart: Normal right atrium. Normal right ventricular  size and function. There is trace tricuspid regurgitation.  Normal pulmonic valve.  Pericardium/PleuraNormal pericardium with no pericardial  effusion.  Hemodynamic: RA Pressure is 10 mm Hg. RVS Pressure is 46 mm  Hg.  Mild pulmonary hypertension.  ------------------------------------------------------------------------  CONCLUSIONS:  1. Mitral annular calcification, and calcified mitral  leaflets with normal diastolic opening. Mild mitral  regurgitation.  2. Aortic valve leaflet morphology not well visualized,  appears calcified with reduced opening. Peak transaortic  valve gradient equals 19 mm Hg, mean transaortic valve  gradient equals 10 mm Hg, consistent with mild aortic  stenosis. Trace aortic regurgitation.  3. Normal left ventricular internal dimensions and wall  thicknesses.  4. Severe segmental left ventricular dysfunction.  The mid  to distal anterior wall, distal inferior wall, anteroseptum  and apex are akinetic. Moderate diastolic dysfunction  (Stage II).  5. Normal right ventricular size and function.  6. RVS Pressure is 46 mm Hg.  Mild pulmonary hypertension.    < end of copied text >      IMPRESSION: 72y Male PAST MEDICAL & SURGICAL HISTORY:  HLD (hyperlipidemia)  Anemia  Acute renal failure  CKD (chronic kidney disease)  COPD (chronic obstructive pulmonary disease)  Fainting  Cardiac arrhythmia  Dizziness  Hydronephrosis  Cholecystitis  Bipolar 1 disorder  DM (diabetes mellitus)  HTN (hypertension)  Lumbar radiculopathy  Left heart failure  Reflux esophagitis  Coronary atherosclerosis  No significant past surgical history       p/w CP, SOB due to NSTEMI. Pt w/ hx of 15 stents, refused stents 2 months ago.    + worsening creatinine 4.96 today    RECOMMENDATIONS:    Echo shows EF 30 - 35%, mild aortic stenosis, moderate diastolic dysfunction  Discussed w/ patient's PMD from [facility] Dr. Moss, patient's baseline creatinine 2 months ago 2.5, has been as high as 4. Patient does not want any interventions at present time, therefore medical mgmt for now.

## 2017-09-08 NOTE — PROGRESS NOTE ADULT - ATTENDING COMMENTS
- pat required high FiO2 supplementation overnight due to worsening pulmonary edema. Started on high dose lasix today. Keep james to monitor i/o  taper iv NTG and medical management. not a candidate for cardiac cath due to renal failure. He has mild hydronephrosis but pat and familly refuse james and percutanous nephrostomy

## 2017-09-08 NOTE — PROGRESS NOTE ADULT - PROBLEM SELECTOR PLAN 2
SBP 200s, responded to labetalol and lopressor IV  - NGT drip for 3 hour and switched to procardia and metoprolol. SBP in 140/150, responded to labetalol and lopressor IV  - NGT drip for 3 hour and switched to procardia and metoprolol.

## 2017-09-08 NOTE — PROGRESS NOTE ADULT - PROBLEM SELECTOR PLAN 1
patient presented with unstable angina in ED  TWI precordial and lateral leads, elevated troponin  currently, chest pain free after ASA, plavix loading, s/p NTG, heparin gtt  no beds in CCU and no cath tonight given naman on ckd  - trend troponin: last troponin 1.8.   - TTE in AM  - ASA, statin, BB, heparin gtt  - cardiology consult -Dr Briseno patient presented with unstable angina in ED  TWI precordial and lateral leads, elevated troponin  currently, with minimal chest pain after ASA, Plavix loading, put back on  NTG, heparin gtt  not as candidate for cath given naman on ckd  - trend troponin: last troponin 1.8->1.5.   - TTE in AM  - ASA, statin, BB, heparin gtt  - cardiology consult -Dr Briseno patient presented with unstable angina in ED  TWI precordial and lateral leads, elevated troponin  currently, with minimal chest pain after ASA, Plavix loading, put back on  NTG, heparin gtt  not as candidate for cath given naman on ckd  - trend troponin: last troponin 1.8->1.5.   - TTE with 35% EF and G2DD  - ASA, statin, BB, heparin gtt  - cardiology consult -Dr Briseno

## 2017-09-08 NOTE — PROGRESS NOTE ADULT - SUBJECTIVE AND OBJECTIVE BOX
CC: Patient denies CP, SOB, n/v/d, fever or chills.    Vital Signs Last 24 Hrs  T(C): 37.1 (08 Sep 2017 12:00), Max: 37.1 (08 Sep 2017 12:00)  T(F): 98.7 (08 Sep 2017 12:00), Max: 98.7 (08 Sep 2017 12:00)  HR: 87 (08 Sep 2017 12:30) (73 - 99)  BP: 143/66 (08 Sep 2017 12:30) (134/70 - 179/90)  BP(mean): 85 (08 Sep 2017 12:30) (79 - 112)  RR: 15 (08 Sep 2017 12:30) (15 - 37)  SpO2: 94% (08 Sep 2017 12:30) (86% - 100%)     @ 07:  -   @ 07:00  --------------------------------------------------------  IN: 922 mL / OUT: 1255 mL / NET: -333 mL     @ 07:  -  08 @ 15:07  --------------------------------------------------------  IN: 505 mL / OUT: 450 mL / NET: 55 mL    PHYSICAL EXAM:  Constitutional: well developed, obese  and in nad  HEENT: Sclera anicteric  Neck: No JVD, supple  Respiratory: reduced air entry lower lungs   Cardiovascular: S1 and S2 normally heard  Gastrointestinal: Obese, soft, nondistended, nontender and normal bowel sounds heard  Extremities: No peripheral edema   Skin: Normal turgor    MEDICATIONS:  HYDROmorphone  Injectable 0.5 milliGRAM(s) IV Push every 3 hours PRN  aspirin  chewable 81 milliGRAM(s) Oral daily  cefTRIAXone   IVPB 1 Gram(s) IV Intermittent every 24 hours  buDESOnide  80 MICROgram(s)/formoterol 4.5 MICROgram(s) Inhaler 2 Puff(s) Inhalation two times a day  tamsulosin 0.4 milliGRAM(s) Oral at bedtime  pantoprazole    Tablet 40 milliGRAM(s) Oral before breakfast  atorvastatin 80 milliGRAM(s) Oral at bedtime  insulin lispro (HumaLOG) corrective regimen sliding scale   SubCutaneous Before meals and at bedtime  clopidogrel Tablet 75 milliGRAM(s) Oral daily  heparin  Infusion. 900 Unit(s)/Hr IV Continuous <Continuous>  metoprolol 75 milliGRAM(s) Oral two times a day  nitroglycerin  Infusion 10 MICROgram(s)/Min IV Continuous <Continuous>  isosorbide   mononitrate ER Tablet (IMDUR) 30 milliGRAM(s) Oral daily    LABS:                      11.5   12.5  )-----------( 208      ( 08 Sep 2017 06:32 )             34.7         137  |  106  |  55<H>  ----------------------------<  161<H>  4.3   |  20<L>  |  4.96<H>    Ca    8.7      08 Sep 2017 06:31  Phos  4.6       Mg     2.1         TPro  6.7  /  Alb  3.1<L>  /  TBili  0.6  /  DBili  x   /  AST  19  /  ALT  13  /  AlkPhos  69      PT/INR - ( 07 Sep 2017 14:03 )   PT: 12.6 sec;   INR: 1.15 ratio       PTT - ( 08 Sep 2017 06:31 )  PTT:67.0 sec  Urinalysis Basic - ( 07 Sep 2017 19:24 )    Color: Yellow / Appearance: Clear / S.020 / pH: x  Gluc: x / Ketone: Trace  / Bili: Negative / Urobili: Negative   Blood: x / Protein: 100 / Nitrite: Negative   Leuk Esterase: Small / RBC: >50 /HPF / WBC 6-10 /HPF   Sq Epi: x / Non Sq Epi: Few / Bacteria: Moderate /HPF    Intact PTH: 156 pg/mL ( @ 09:29)    ASSESSMENT AND PLAN:     Problem/Recommendation - 1:  Problem: LIN (acute kidney injury) secondary to rt.sided obstructive uropathy plus pre renal azotemia secondary to NSTEMI. No improvement of renal function.  -Keep patient euvolemic and renal diet  -Avoid Nephrotoxic Meds/ Agents such as (NSAIDs, IV contrast, Aminoglycosides such as gentamicin, -Gadolinium contrast, Phosphate containing enemas, etc..)  -Adjust Medications according to eGFR      Problem/Recommendation - 2:  ·  Problem: Chronic kidney disease, unspecified CKD stage secondary to dm/htn    Problem/Recommendation - 3:  ·  Problem: Acidosis, metabolic.  Recommendation: mild , secondary to lin on ckd  -f/o co2 daily  -get abg if co2 is worsening    Problem/Recommendation - 4:  ·  Problem: Renal stones.  Recommendation: hx of recurrent renal stones /ureteral stones, with rt.mild hydro  -plan as per urology  -pt will need renal stone w/u as outpatient    Problem/Recommendation - 5:  ·  Problem: Hypertension secondary to other renal disorders.   -Titrate BP medication to keep systolic bp <140.     Problem/Recommendation - 6:  - Secondary Hyperparathyroidism. No need for Rx.  - Observe PTH.

## 2017-09-09 LAB
ALBUMIN SERPL ELPH-MCNC: 2.5 G/DL — LOW (ref 3.5–5)
ALP SERPL-CCNC: 66 U/L — SIGNIFICANT CHANGE UP (ref 40–120)
ALT FLD-CCNC: 13 U/L DA — SIGNIFICANT CHANGE UP (ref 10–60)
ANION GAP SERPL CALC-SCNC: 15 MMOL/L — SIGNIFICANT CHANGE UP (ref 5–17)
APTT BLD: 34.5 SEC — SIGNIFICANT CHANGE UP (ref 27.5–37.4)
AST SERPL-CCNC: 47 U/L — HIGH (ref 10–40)
BASOPHILS # BLD AUTO: 0.1 K/UL — SIGNIFICANT CHANGE UP (ref 0–0.2)
BASOPHILS NFR BLD AUTO: 0.5 % — SIGNIFICANT CHANGE UP (ref 0–2)
BILIRUB SERPL-MCNC: 0.5 MG/DL — SIGNIFICANT CHANGE UP (ref 0.2–1.2)
BUN SERPL-MCNC: 70 MG/DL — HIGH (ref 7–18)
CALCIUM SERPL-MCNC: 8.8 MG/DL — SIGNIFICANT CHANGE UP (ref 8.4–10.5)
CHLORIDE SERPL-SCNC: 102 MMOL/L — SIGNIFICANT CHANGE UP (ref 96–108)
CO2 SERPL-SCNC: 18 MMOL/L — LOW (ref 22–31)
CREAT SERPL-MCNC: 6.17 MG/DL — HIGH (ref 0.5–1.3)
EOSINOPHIL # BLD AUTO: 0 K/UL — SIGNIFICANT CHANGE UP (ref 0–0.5)
EOSINOPHIL NFR BLD AUTO: 0.2 % — SIGNIFICANT CHANGE UP (ref 0–6)
GLUCOSE SERPL-MCNC: 191 MG/DL — HIGH (ref 70–99)
HCT VFR BLD CALC: 32.2 % — LOW (ref 39–50)
HGB BLD-MCNC: 10.7 G/DL — LOW (ref 13–17)
INR BLD: 1.2 RATIO — HIGH (ref 0.88–1.16)
LYMPHOCYTES # BLD AUTO: 1.2 K/UL — SIGNIFICANT CHANGE UP (ref 1–3.3)
LYMPHOCYTES # BLD AUTO: 10.2 % — LOW (ref 13–44)
MAGNESIUM SERPL-MCNC: 2.2 MG/DL — SIGNIFICANT CHANGE UP (ref 1.6–2.6)
MCHC RBC-ENTMCNC: 27.8 PG — SIGNIFICANT CHANGE UP (ref 27–34)
MCHC RBC-ENTMCNC: 33.1 GM/DL — SIGNIFICANT CHANGE UP (ref 32–36)
MCV RBC AUTO: 84 FL — SIGNIFICANT CHANGE UP (ref 80–100)
MONOCYTES # BLD AUTO: 0.8 K/UL — SIGNIFICANT CHANGE UP (ref 0–0.9)
MONOCYTES NFR BLD AUTO: 7 % — SIGNIFICANT CHANGE UP (ref 2–14)
NEUTROPHILS # BLD AUTO: 9.5 K/UL — HIGH (ref 1.8–7.4)
NEUTROPHILS NFR BLD AUTO: 82 % — HIGH (ref 43–77)
PCP SPEC-MCNC: SIGNIFICANT CHANGE UP
PHOSPHATE SERPL-MCNC: 5.1 MG/DL — HIGH (ref 2.5–4.5)
PLATELET # BLD AUTO: 216 K/UL — SIGNIFICANT CHANGE UP (ref 150–400)
POTASSIUM SERPL-MCNC: 4.4 MMOL/L — SIGNIFICANT CHANGE UP (ref 3.5–5.3)
POTASSIUM SERPL-SCNC: 4.4 MMOL/L — SIGNIFICANT CHANGE UP (ref 3.5–5.3)
PROT SERPL-MCNC: 6.9 G/DL — SIGNIFICANT CHANGE UP (ref 6–8.3)
PROTHROM AB SERPL-ACNC: 13.1 SEC — HIGH (ref 9.8–12.7)
RBC # BLD: 3.84 M/UL — LOW (ref 4.2–5.8)
RBC # FLD: 12.8 % — SIGNIFICANT CHANGE UP (ref 10.3–14.5)
SODIUM SERPL-SCNC: 135 MMOL/L — SIGNIFICANT CHANGE UP (ref 135–145)
WBC # BLD: 11.6 K/UL — HIGH (ref 3.8–10.5)
WBC # FLD AUTO: 11.6 K/UL — HIGH (ref 3.8–10.5)

## 2017-09-09 PROCEDURE — 71010: CPT | Mod: 26

## 2017-09-09 RX ADMIN — BUDESONIDE AND FORMOTEROL FUMARATE DIHYDRATE 2 PUFF(S): 160; 4.5 AEROSOL RESPIRATORY (INHALATION) at 23:20

## 2017-09-09 RX ADMIN — HYDROMORPHONE HYDROCHLORIDE 0.5 MILLIGRAM(S): 2 INJECTION INTRAMUSCULAR; INTRAVENOUS; SUBCUTANEOUS at 17:23

## 2017-09-09 RX ADMIN — HYDROMORPHONE HYDROCHLORIDE 0.5 MILLIGRAM(S): 2 INJECTION INTRAMUSCULAR; INTRAVENOUS; SUBCUTANEOUS at 12:08

## 2017-09-09 RX ADMIN — BUDESONIDE AND FORMOTEROL FUMARATE DIHYDRATE 2 PUFF(S): 160; 4.5 AEROSOL RESPIRATORY (INHALATION) at 10:33

## 2017-09-09 RX ADMIN — Medication 100 MILLIGRAM(S): at 18:30

## 2017-09-09 RX ADMIN — Medication 100 MILLIGRAM(S): at 05:00

## 2017-09-09 RX ADMIN — CLOPIDOGREL BISULFATE 75 MILLIGRAM(S): 75 TABLET, FILM COATED ORAL at 11:46

## 2017-09-09 RX ADMIN — TAMSULOSIN HYDROCHLORIDE 0.4 MILLIGRAM(S): 0.4 CAPSULE ORAL at 21:15

## 2017-09-09 RX ADMIN — HYDROMORPHONE HYDROCHLORIDE 0.5 MILLIGRAM(S): 2 INJECTION INTRAMUSCULAR; INTRAVENOUS; SUBCUTANEOUS at 22:29

## 2017-09-09 RX ADMIN — CEFTRIAXONE 100 GRAM(S): 500 INJECTION, POWDER, FOR SOLUTION INTRAMUSCULAR; INTRAVENOUS at 21:15

## 2017-09-09 RX ADMIN — HYDROMORPHONE HYDROCHLORIDE 0.5 MILLIGRAM(S): 2 INJECTION INTRAMUSCULAR; INTRAVENOUS; SUBCUTANEOUS at 08:25

## 2017-09-09 RX ADMIN — Medication 81 MILLIGRAM(S): at 11:46

## 2017-09-09 RX ADMIN — HYDROMORPHONE HYDROCHLORIDE 0.5 MILLIGRAM(S): 2 INJECTION INTRAMUSCULAR; INTRAVENOUS; SUBCUTANEOUS at 08:15

## 2017-09-09 RX ADMIN — Medication: at 11:41

## 2017-09-09 RX ADMIN — ISOSORBIDE MONONITRATE 30 MILLIGRAM(S): 60 TABLET, EXTENDED RELEASE ORAL at 11:46

## 2017-09-09 RX ADMIN — HYDROMORPHONE HYDROCHLORIDE 0.5 MILLIGRAM(S): 2 INJECTION INTRAMUSCULAR; INTRAVENOUS; SUBCUTANEOUS at 12:06

## 2017-09-09 RX ADMIN — HYDROMORPHONE HYDROCHLORIDE 0.5 MILLIGRAM(S): 2 INJECTION INTRAMUSCULAR; INTRAVENOUS; SUBCUTANEOUS at 01:45

## 2017-09-09 RX ADMIN — ATORVASTATIN CALCIUM 80 MILLIGRAM(S): 80 TABLET, FILM COATED ORAL at 21:15

## 2017-09-09 RX ADMIN — PANTOPRAZOLE SODIUM 40 MILLIGRAM(S): 20 TABLET, DELAYED RELEASE ORAL at 06:00

## 2017-09-09 RX ADMIN — HYDROMORPHONE HYDROCHLORIDE 0.5 MILLIGRAM(S): 2 INJECTION INTRAMUSCULAR; INTRAVENOUS; SUBCUTANEOUS at 01:50

## 2017-09-09 RX ADMIN — Medication 4: at 21:14

## 2017-09-09 RX ADMIN — HYDROMORPHONE HYDROCHLORIDE 0.5 MILLIGRAM(S): 2 INJECTION INTRAMUSCULAR; INTRAVENOUS; SUBCUTANEOUS at 17:05

## 2017-09-09 NOTE — PHYSICAL THERAPY INITIAL EVALUATION ADULT - IMPAIRMENTS FOUND, PT EVAL
gait, locomotion, and balance/muscle strength/aerobic capacity/endurance/ventilation and respiration/gas exchange

## 2017-09-09 NOTE — PROGRESS NOTE ADULT - SUBJECTIVE AND OBJECTIVE BOX
Patient is seen and examined at the bed side, is afebrile. The Leukocytosis is trending down, He is c/o Lower abdominal tenderness but refusing all  work up including Placing a james catheter.            ROS: All other review systems are negative            ICU Vital Signs Last 24 Hrs  T(C): 36.8 (08 Sep 2017 17:30), Max: 36.8 (08 Sep 2017 17:30)  T(F): 98.2 (08 Sep 2017 17:30), Max: 98.2 (08 Sep 2017 17:30)  HR: 90 (09 Sep 2017 15:00) (83 - 137)  BP: 138/62 (09 Sep 2017 15:00) (109/72 - 149/76)  BP(mean): 82 (09 Sep 2017 15:00) (76 - 93)  ABP: --  ABP(mean): --  RR: 20 (09 Sep 2017 15:00) (14 - 25)  SpO2: 92% (09 Sep 2017 15:00) (81% - 94%)          PHYSICAL EXAMINATION:      GENERAL: Not in acute distress  CVS: s1 and s2 present  RESP: Air entry b/L  ABDOMEN: RLQ tenderness > LLQ tenderness  EXTREMITIES: No pedal edema.  NEUROLOGIC: Awake, alert, oriented.                 LABS:                        10.7   11.6  )-----------( 216      ( 09 Sep 2017 04:08 )             32.2                           11.5   12.5  )-----------( 208      ( 08 Sep 2017 06:32 )             34.7             09-09    135  |  102  |  70<H>  ----------------------------<  191<H>  4.4   |  18<L>  |  6.17<H>    Ca    8.8      09 Sep 2017 04:08  Phos  5.1     09-09  Mg     2.2     -    TPro  6.9  /  Alb  2.5<L>  /  TBili  0.5  /  DBili  x   /  AST  47<H>  /  ALT  13  /  AlkPhos  66  09-09        TPro  6.7  /  Alb  3.1<L>  /  TBili  0.6  /  DBili  x   /  AST  19  /  ALT  13  /  AlkPhos  69  09-07    PT/INR - ( 07 Sep 2017 14:03 )   PT: 12.6 sec;   INR: 1.15 ratio         PTT - ( 08 Sep 2017 06:31 )  PTT:67.0 sec  Urinalysis Basic - ( 07 Sep 2017 19:24 )    Color: Yellow / Appearance: Clear / S.020 / pH: x  Gluc: x / Ketone: Trace  / Bili: Negative / Urobili: Negative   Blood: x / Protein: 100 / Nitrite: Negative   Leuk Esterase: Small / RBC: >50 /HPF / WBC 6-10 /HPF   Sq Epi: x / Non Sq Epi: Few / Bacteria: Moderate /HPF      ABG - ( 08 Sep 2017 04:51 )  pH: 7.37  /  pCO2: 36    /  pO2: 116   / HCO3: 21    / Base Excess: -3.6  /  SaO2: 98              MICROBIOLOGY DATA:        Culture - Blood (17 @ 10:02)    Specimen Source: .Blood Blood-Peripheral    Culture Results:   No growth to date.    Culture - Urine (17 @ 23:53)    Specimen Source: .Urine Clean Catch (Midstream)    Culture Results:   No growth        RADIOLOGY DATA:      17: CT Abdomen and Pelvis No Cont (17 @ 21:23) : Two RIGHT proximal ureteral calculi measuring 7 mm and 8 mm. Mild RIGHT hydronephrosis.  Atrophic left kidney, possibly chronic. Additional nonobstructive bilateral renal calculi. Left ureterovesical junction calculi measuring up to 12 mm. No left   hydronephrosis. Small right pleural effusion.      17 : Xray Chest 1 View AP- PORTABLE-Urgent (17 @ 20:15) Impression: New airspace opacification in the right mid to lower lung zone for which clinical correlation with pneumonia is recommended. No gross pleural effusion or pneumothorax. Skinfold on the left. Stable cardiac silhouette.        < from: Transthoracic Echocardiogram (17 @ 06:51) >  CONCLUSIONS:  1. Mitral annular calcification, and calcified mitral  leaflets with normal diastolic opening. Mild mitral  regurgitation.  2. Aortic valve leaflet morphology not well visualized,  appears calcified with reduced opening. Peak transaortic  valve gradient equals 19 mm Hg, mean transaortic valve  gradient equals 10 mm Hg, consistent with mild aortic  stenosis. Trace aortic regurgitation.  3. Normal left ventricular internal dimensions and wall  thicknesses.  4. Severe segmental left ventricular dysfunction.  The mid  to distal anterior wall, distal inferior wall, anteroseptum  and apex are akinetic. Moderate diastolic dysfunction  (Stage II).  5. Normal right ventricular size and function.  6. RVS Pressure is 46 mm Hg.  Mild pulmonary hypertension.

## 2017-09-09 NOTE — PHYSICAL THERAPY INITIAL EVALUATION ADULT - DIAGNOSIS, PT EVAL
impaired cardiopulmonary function; impaired mobility and function; shortness of breath with exertion/activity

## 2017-09-09 NOTE — PROGRESS NOTE ADULT - ASSESSMENT
71yo male, German speaking, wife is current patient in ICU, daughter at bedside to [facility], [facility] CAD s/p 12-14 stents (placed [phone]), DM, HTN, HLD, CVA (4 yrs ago, residual left sided facial weakness), lumbar radiculopathy with herniated disc, COPD (never smoker, passive smoke exposure), CKD (last creatinine 2 months ago was 1.2), bipolar disorder (no meds, sees psychiatrist), chronic kidney stones (has had ureteral stent in past) p/w 2 days intractable right flank pain radiating to groin, chills, vomiting.  BIBA.       # complicated UTI with b/l U stones and mild right hydronephrosis. u/a grossly positive, nitrite +, likley G -ve infection.     Would recommendation:  1. Continue Ceftriaxone  2. Urology recommendation  noted  3. Monitor WBC count, is trending down  4. IVF     d/w patient and ICU  team

## 2017-09-10 LAB
ALBUMIN SERPL ELPH-MCNC: 2.8 G/DL — LOW (ref 3.5–5)
ALP SERPL-CCNC: 75 U/L — SIGNIFICANT CHANGE UP (ref 40–120)
ALT FLD-CCNC: 11 U/L DA — SIGNIFICANT CHANGE UP (ref 10–60)
ANION GAP SERPL CALC-SCNC: 16 MMOL/L — SIGNIFICANT CHANGE UP (ref 5–17)
APTT BLD: 35.7 SEC — SIGNIFICANT CHANGE UP (ref 27.5–37.4)
AST SERPL-CCNC: 26 U/L — SIGNIFICANT CHANGE UP (ref 10–40)
BASE EXCESS BLDA CALC-SCNC: -10.5 MMOL/L — LOW (ref -2–2)
BASE EXCESS BLDA CALC-SCNC: -7.2 MMOL/L — LOW (ref -2–2)
BASOPHILS # BLD AUTO: 0 K/UL — SIGNIFICANT CHANGE UP (ref 0–0.2)
BASOPHILS NFR BLD AUTO: 0.3 % — SIGNIFICANT CHANGE UP (ref 0–2)
BILIRUB SERPL-MCNC: 0.6 MG/DL — SIGNIFICANT CHANGE UP (ref 0.2–1.2)
BLOOD GAS COMMENTS ARTERIAL: SIGNIFICANT CHANGE UP
BLOOD GAS COMMENTS ARTERIAL: SIGNIFICANT CHANGE UP
BUN SERPL-MCNC: 90 MG/DL — HIGH (ref 7–18)
CALCIUM SERPL-MCNC: 9 MG/DL — SIGNIFICANT CHANGE UP (ref 8.4–10.5)
CHLORIDE SERPL-SCNC: 100 MMOL/L — SIGNIFICANT CHANGE UP (ref 96–108)
CO2 SERPL-SCNC: 17 MMOL/L — LOW (ref 22–31)
CREAT SERPL-MCNC: 8.23 MG/DL — HIGH (ref 0.5–1.3)
EOSINOPHIL # BLD AUTO: 0 K/UL — SIGNIFICANT CHANGE UP (ref 0–0.5)
EOSINOPHIL NFR BLD AUTO: 0.1 % — SIGNIFICANT CHANGE UP (ref 0–6)
GLUCOSE SERPL-MCNC: 180 MG/DL — HIGH (ref 70–99)
HAV IGM SER-ACNC: SIGNIFICANT CHANGE UP
HBV CORE IGM SER-ACNC: SIGNIFICANT CHANGE UP
HBV SURFACE AG SER-ACNC: SIGNIFICANT CHANGE UP
HCO3 BLDA-SCNC: 16 MMOL/L — LOW (ref 23–27)
HCO3 BLDA-SCNC: 17 MMOL/L — LOW (ref 23–27)
HCT VFR BLD CALC: 36.6 % — LOW (ref 39–50)
HCV AB S/CO SERPL IA: 0.22 S/CO — SIGNIFICANT CHANGE UP
HCV AB SERPL-IMP: SIGNIFICANT CHANGE UP
HGB BLD-MCNC: 12 G/DL — LOW (ref 13–17)
HOROWITZ INDEX BLDA+IHG-RTO: 100 — SIGNIFICANT CHANGE UP
HOROWITZ INDEX BLDA+IHG-RTO: 28 — SIGNIFICANT CHANGE UP
INR BLD: 1.25 RATIO — HIGH (ref 0.88–1.16)
LYMPHOCYTES # BLD AUTO: 0.9 K/UL — LOW (ref 1–3.3)
LYMPHOCYTES # BLD AUTO: 6.6 % — LOW (ref 13–44)
MAGNESIUM SERPL-MCNC: 2.4 MG/DL — SIGNIFICANT CHANGE UP (ref 1.6–2.6)
MCHC RBC-ENTMCNC: 28.3 PG — SIGNIFICANT CHANGE UP (ref 27–34)
MCHC RBC-ENTMCNC: 32.8 GM/DL — SIGNIFICANT CHANGE UP (ref 32–36)
MCV RBC AUTO: 86.3 FL — SIGNIFICANT CHANGE UP (ref 80–100)
MONOCYTES # BLD AUTO: 0.9 K/UL — SIGNIFICANT CHANGE UP (ref 0–0.9)
MONOCYTES NFR BLD AUTO: 7 % — SIGNIFICANT CHANGE UP (ref 2–14)
NEUTROPHILS # BLD AUTO: 11.1 K/UL — HIGH (ref 1.8–7.4)
NEUTROPHILS NFR BLD AUTO: 86 % — HIGH (ref 43–77)
PCO2 BLDA: 34 MMHG — SIGNIFICANT CHANGE UP (ref 32–46)
PCO2 BLDA: 36 MMHG — SIGNIFICANT CHANGE UP (ref 32–46)
PH BLDA: 7.25 — LOW (ref 7.35–7.45)
PH BLDA: 7.33 — LOW (ref 7.35–7.45)
PHOSPHATE SERPL-MCNC: 6.3 MG/DL — HIGH (ref 2.5–4.5)
PLATELET # BLD AUTO: 226 K/UL — SIGNIFICANT CHANGE UP (ref 150–400)
PO2 BLDA: 61 MMHG — LOW (ref 74–108)
PO2 BLDA: 69 MMHG — LOW (ref 74–108)
POTASSIUM SERPL-MCNC: 4.4 MMOL/L — SIGNIFICANT CHANGE UP (ref 3.5–5.3)
POTASSIUM SERPL-SCNC: 4.4 MMOL/L — SIGNIFICANT CHANGE UP (ref 3.5–5.3)
PROT SERPL-MCNC: 7.6 G/DL — SIGNIFICANT CHANGE UP (ref 6–8.3)
PROTHROM AB SERPL-ACNC: 13.7 SEC — HIGH (ref 9.8–12.7)
RBC # BLD: 4.25 M/UL — SIGNIFICANT CHANGE UP (ref 4.2–5.8)
RBC # FLD: 13 % — SIGNIFICANT CHANGE UP (ref 10.3–14.5)
SAO2 % BLDA: 88 % — LOW (ref 92–96)
SAO2 % BLDA: 90 % — LOW (ref 92–96)
SODIUM SERPL-SCNC: 133 MMOL/L — LOW (ref 135–145)
WBC # BLD: 12.9 K/UL — HIGH (ref 3.8–10.5)
WBC # FLD AUTO: 12.9 K/UL — HIGH (ref 3.8–10.5)

## 2017-09-10 PROCEDURE — 74000: CPT | Mod: 26

## 2017-09-10 PROCEDURE — 71010: CPT | Mod: 26,76

## 2017-09-10 RX ORDER — CLOPIDOGREL BISULFATE 75 MG/1
75 TABLET, FILM COATED ORAL DAILY
Qty: 0 | Refills: 0 | Status: DISCONTINUED | OUTPATIENT
Start: 2017-09-10 | End: 2017-09-12

## 2017-09-10 RX ORDER — SODIUM CHLORIDE 9 MG/ML
1000 INJECTION, SOLUTION INTRAVENOUS
Qty: 0 | Refills: 0 | Status: DISCONTINUED | OUTPATIENT
Start: 2017-09-10 | End: 2017-09-10

## 2017-09-10 RX ORDER — DEXTROSE 50 % IN WATER 50 %
12.5 SYRINGE (ML) INTRAVENOUS ONCE
Qty: 0 | Refills: 0 | Status: DISCONTINUED | OUTPATIENT
Start: 2017-09-10 | End: 2017-09-10

## 2017-09-10 RX ORDER — FUROSEMIDE 40 MG
15 TABLET ORAL
Qty: 100 | Refills: 0 | Status: DISCONTINUED | OUTPATIENT
Start: 2017-09-10 | End: 2017-09-10

## 2017-09-10 RX ORDER — MORPHINE SULFATE 50 MG/1
2 CAPSULE, EXTENDED RELEASE ORAL ONCE
Qty: 0 | Refills: 0 | Status: DISCONTINUED | OUTPATIENT
Start: 2017-09-10 | End: 2017-09-10

## 2017-09-10 RX ORDER — FUROSEMIDE 40 MG
15 TABLET ORAL
Qty: 500 | Refills: 0 | Status: DISCONTINUED | OUTPATIENT
Start: 2017-09-10 | End: 2017-09-10

## 2017-09-10 RX ORDER — NITROGLYCERIN 6.5 MG
10 CAPSULE, EXTENDED RELEASE ORAL
Qty: 50 | Refills: 0 | Status: DISCONTINUED | OUTPATIENT
Start: 2017-09-10 | End: 2017-09-10

## 2017-09-10 RX ORDER — DEXTROSE 50 % IN WATER 50 %
25 SYRINGE (ML) INTRAVENOUS ONCE
Qty: 0 | Refills: 0 | Status: DISCONTINUED | OUTPATIENT
Start: 2017-09-10 | End: 2017-09-10

## 2017-09-10 RX ORDER — PHENYLEPHRINE HYDROCHLORIDE 10 MG/ML
0.1 INJECTION INTRAVENOUS
Qty: 160 | Refills: 0 | Status: DISCONTINUED | OUTPATIENT
Start: 2017-09-10 | End: 2017-09-11

## 2017-09-10 RX ORDER — DEXTROSE 50 % IN WATER 50 %
1 SYRINGE (ML) INTRAVENOUS ONCE
Qty: 0 | Refills: 0 | Status: DISCONTINUED | OUTPATIENT
Start: 2017-09-10 | End: 2017-09-10

## 2017-09-10 RX ORDER — PROPOFOL 10 MG/ML
0.1 INJECTION, EMULSION INTRAVENOUS
Qty: 1000 | Refills: 0 | Status: DISCONTINUED | OUTPATIENT
Start: 2017-09-10 | End: 2017-09-11

## 2017-09-10 RX ORDER — HEPARIN SODIUM 5000 [USP'U]/ML
5000 INJECTION INTRAVENOUS; SUBCUTANEOUS EVERY 12 HOURS
Qty: 0 | Refills: 0 | Status: DISCONTINUED | OUTPATIENT
Start: 2017-09-10 | End: 2017-09-12

## 2017-09-10 RX ORDER — FUROSEMIDE 40 MG
15 TABLET ORAL
Qty: 500 | Refills: 0 | Status: DISCONTINUED | OUTPATIENT
Start: 2017-09-10 | End: 2017-09-11

## 2017-09-10 RX ORDER — GLUCAGON INJECTION, SOLUTION 0.5 MG/.1ML
1 INJECTION, SOLUTION SUBCUTANEOUS ONCE
Qty: 0 | Refills: 0 | Status: DISCONTINUED | OUTPATIENT
Start: 2017-09-10 | End: 2017-09-10

## 2017-09-10 RX ORDER — INSULIN LISPRO 100/ML
VIAL (ML) SUBCUTANEOUS EVERY 6 HOURS
Qty: 0 | Refills: 0 | Status: DISCONTINUED | OUTPATIENT
Start: 2017-09-10 | End: 2017-09-12

## 2017-09-10 RX ORDER — HYDROMORPHONE HYDROCHLORIDE 2 MG/ML
1 INJECTION INTRAMUSCULAR; INTRAVENOUS; SUBCUTANEOUS EVERY 4 HOURS
Qty: 0 | Refills: 0 | Status: DISCONTINUED | OUTPATIENT
Start: 2017-09-10 | End: 2017-09-11

## 2017-09-10 RX ADMIN — Medication 7.5 MG/HR: at 18:05

## 2017-09-10 RX ADMIN — MORPHINE SULFATE 2 MILLIGRAM(S): 50 CAPSULE, EXTENDED RELEASE ORAL at 01:08

## 2017-09-10 RX ADMIN — Medication 2: at 06:04

## 2017-09-10 RX ADMIN — Medication 3 MICROGRAM(S)/MIN: at 00:30

## 2017-09-10 RX ADMIN — TAMSULOSIN HYDROCHLORIDE 0.4 MILLIGRAM(S): 0.4 CAPSULE ORAL at 21:55

## 2017-09-10 RX ADMIN — ATORVASTATIN CALCIUM 80 MILLIGRAM(S): 80 TABLET, FILM COATED ORAL at 21:55

## 2017-09-10 RX ADMIN — PROPOFOL 0.05 MICROGRAM(S)/KG/MIN: 10 INJECTION, EMULSION INTRAVENOUS at 13:25

## 2017-09-10 RX ADMIN — CLOPIDOGREL BISULFATE 75 MILLIGRAM(S): 75 TABLET, FILM COATED ORAL at 18:12

## 2017-09-10 RX ADMIN — HYDROMORPHONE HYDROCHLORIDE 0.5 MILLIGRAM(S): 2 INJECTION INTRAMUSCULAR; INTRAVENOUS; SUBCUTANEOUS at 04:43

## 2017-09-10 RX ADMIN — Medication 3 MICROGRAM(S)/MIN: at 09:23

## 2017-09-10 RX ADMIN — CEFTRIAXONE 100 GRAM(S): 500 INJECTION, POWDER, FOR SOLUTION INTRAMUSCULAR; INTRAVENOUS at 19:15

## 2017-09-10 RX ADMIN — Medication 7.5 MG/HR: at 14:59

## 2017-09-10 RX ADMIN — PROPOFOL 0.05 MICROGRAM(S)/KG/MIN: 10 INJECTION, EMULSION INTRAVENOUS at 12:30

## 2017-09-10 RX ADMIN — PROPOFOL 0.05 MICROGRAM(S)/KG/MIN: 10 INJECTION, EMULSION INTRAVENOUS at 21:16

## 2017-09-10 RX ADMIN — Medication 81 MILLIGRAM(S): at 13:17

## 2017-09-10 RX ADMIN — BUDESONIDE AND FORMOTEROL FUMARATE DIHYDRATE 2 PUFF(S): 160; 4.5 AEROSOL RESPIRATORY (INHALATION) at 09:22

## 2017-09-10 RX ADMIN — HEPARIN SODIUM 5000 UNIT(S): 5000 INJECTION INTRAVENOUS; SUBCUTANEOUS at 18:12

## 2017-09-10 RX ADMIN — PANTOPRAZOLE SODIUM 40 MILLIGRAM(S): 20 TABLET, DELAYED RELEASE ORAL at 06:01

## 2017-09-10 RX ADMIN — Medication 7.5 MG/HR: at 13:14

## 2017-09-10 RX ADMIN — HYDROMORPHONE HYDROCHLORIDE 0.5 MILLIGRAM(S): 2 INJECTION INTRAMUSCULAR; INTRAVENOUS; SUBCUTANEOUS at 07:22

## 2017-09-10 RX ADMIN — HYDROMORPHONE HYDROCHLORIDE 1 MILLIGRAM(S): 2 INJECTION INTRAMUSCULAR; INTRAVENOUS; SUBCUTANEOUS at 08:25

## 2017-09-10 RX ADMIN — Medication 100 MILLIGRAM(S): at 06:02

## 2017-09-10 RX ADMIN — PHENYLEPHRINE HYDROCHLORIDE 1.66 MICROGRAM(S)/KG/MIN: 10 INJECTION INTRAVENOUS at 11:30

## 2017-09-10 RX ADMIN — HYDROMORPHONE HYDROCHLORIDE 1 MILLIGRAM(S): 2 INJECTION INTRAMUSCULAR; INTRAVENOUS; SUBCUTANEOUS at 08:55

## 2017-09-10 RX ADMIN — Medication 2: at 13:16

## 2017-09-10 NOTE — PROGRESS NOTE ADULT - ASSESSMENT
71yo male, Turkish speaking, wife is current patient in ICU, daughter at bedside to [facility], [facility] CAD s/p 12-14 stents (placed [phone]), DM, HTN, HLD, CVA (4 yrs ago, residual left sided facial weakness), lumbar radiculopathy with herniated disc, COPD (never smoker, passive smoke exposure), CKD (last creatinine 2 months ago was 1.2), bipolar disorder (no meds, sees psychiatrist), chronic kidney stones (has had ureteral stent in past) p/w 2 days intractable right flank pain radiating to groin, chills, vomiting 2/2 kidney stones/UTI, rule out pyelonephritis/obstruction.  Patient with NSTEMI with unstable angina and TWI lateral leads

## 2017-09-10 NOTE — PROGRESS NOTE ADULT - ASSESSMENT
A 73yo male, Ivorian speaking, wife is current patient in ICU, daughter at bedside to [facility], [facility] CAD s/p 12-14 stents (placed [phone]), DM, HTN, HLD, CVA (4 yrs ago, residual left sided facial weakness), lumbar radiculopathy with herniated disc, COPD (never smoker, passive smoke exposure), CKD (last creatinine 2 months ago was 1.2), bipolar disorder (no meds, sees psychiatrist), chronic kidney stones (has had ureteral stent in past) p/w 2 days intractable right flank pain radiating to groin, chills, vomiting.  BIBA.       # complicated UTI with b/l U stones and mild right hydronephrosis. u/a grossly positive, nitrite +, likley G -ve infection.     Would recommendation:    1. Obtain repeat UA and culture  2. Continue Ceftriaxone for now  2. Urology recommendation  noted  3. Monitor WBC count, is mildly elevated  4. IVF     d/w Family at the bed side and ICU  team

## 2017-09-10 NOTE — PROGRESS NOTE ADULT - PROBLEM SELECTOR PLAN 3
LIN on CKD, last creatinine 2.1 in 2016  CT abdomen/pelvis: right hydronephrosis, right ureteral stone; bilateral renal calculi, left renal atrophy.  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  patient refused james  - urology consult tonight - Dr Irvin consulted LIN on CKD, last creatinine increasing to 8.23 with BUN 90.   Patient refused james, dialysis and IR guided nephrostomy.   CT abdomen/pelvis: right hydronephrosis, right ureteral stone; bilateral renal calculi, left renal atrophy.  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    - urology consult tonight - Dr Irvin consulted

## 2017-09-10 NOTE — PROGRESS NOTE ADULT - PROBLEM SELECTOR PLAN 5
BSL controlled  - hold oral hypoglycemics  - diabetic diet, HSS, follow up A1C BSL controlled  - hold oral hypoglycemics  - diabetic diet, HSS,

## 2017-09-10 NOTE — DIETITIAN INITIAL EVALUATION ADULT. - OTHER INFO
Patient seen in ICU for length of stay. Patient from home. Visited pt. intubated in am & on vent, per RN plans to HD today via PermCath, presently no family at bedside, wife also admitted to medical floor, skin intact. Propofol infusing noted.

## 2017-09-10 NOTE — AIRWAY PLACEMENT NOTE ADULT - POST AIRWAY PLACEMENT ASSESSMENT:
positive end tidal CO2 noted/CXR pending/chest excursion noted/breath sounds bilateral/breath sounds equal/skin color improved

## 2017-09-10 NOTE — PROCEDURE NOTE - NSPROCDETAILS_GEN_ALL_CORE
sterile dressing applied/guidewire recovered/sterile technique, catheter placed/lumen(s) aspirated and flushed

## 2017-09-10 NOTE — PROGRESS NOTE ADULT - PROBLEM SELECTOR PLAN 2
SBP in 140/150, responded to labetalol and lopressor IV  - NGT drip for 3 hour and switched to procardia and metoprolol. SBP in 140/150, responded to labetalol and lopressor IV  - NGT drip was restated due to patient recurrent chest pain.

## 2017-09-10 NOTE — DIETITIAN INITIAL EVALUATION ADULT. - MD RECOMMEND
Advance diet with nutrition supplement as medically feasible. or consider alternate nutrition support if NPO prolonged. If tube feeding considered recommend (without propofol) Nepro, goal: 40ml/hr x 24hrs (969ml, 1728 Kcal, Protein 78 grams, Free Water 704 ml) MD to adust fluids as needed, as medically feasible

## 2017-09-10 NOTE — CHART NOTE - NSCHARTNOTEFT_GEN_A_CORE
pat became agitated , tachypneic, tachycardiac , using accessory muscles for resp and paradoxic abd muscles too. CXR shows b/l pul edema and woresning renal fx with metabolic acidosis. Family and renal agreed for emergent dialysis . Pat was intubated in icu for acute resp failure.  Dialysis cath will be inserted by surgery and dialysis team is coming in as per nursing supervisor

## 2017-09-10 NOTE — PROGRESS NOTE ADULT - SUBJECTIVE AND OBJECTIVE BOX
CC: Patient is on ventilator, NAD. No fever or chills.    Vital Signs Last 24 Hrs  T(C): 36.1 (10 Sep 2017 06:17), Max: 37.4 (10 Sep 2017 00:09)  T(F): 96.9 (10 Sep 2017 06:17), Max: 99.4 (10 Sep 2017 00:09)  HR: 125 (10 Sep 2017 11:34) (89 - 125)  BP: 138/79 (10 Sep 2017 08:30) (121/85 - 161/81)  BP(mean): 93 (10 Sep 2017 08:30) (81 - 120)  RR: 14 (10 Sep 2017 08:30) (10 - 30)  SpO2: 96% (10 Sep 2017 11:34) (86% - 96%)    09-09 @ 07:01  -  09-10 @ 07:00  --------------------------------------------------------  IN: 600 mL / OUT: 675 mL / NET: -75 mL    PHYSICAL EXAM:  Constitutional: well developed, obese   HEENT: Sclera anicteric  Neck: No JVD, supple  Respiratory: reduced air entry lower lungs, bilatera crackles  Cardiovascular: S1 and S2 normally heard  Gastrointestinal: Obese, soft, nondistended, nontender and normal bowel sounds heard  Extremities: No peripheral edema   Skin: Normal turgor  MEDICATIONS:  aspirin  chewable 81 milliGRAM(s) Oral daily  cefTRIAXone   IVPB 1 Gram(s) IV Intermittent every 24 hours  buDESOnide  80 MICROgram(s)/formoterol 4.5 MICROgram(s) Inhaler 2 Puff(s) Inhalation two times a day  tamsulosin 0.4 milliGRAM(s) Oral at bedtime  pantoprazole    Tablet 40 milliGRAM(s) Oral before breakfast  atorvastatin 80 milliGRAM(s) Oral at bedtime  metoprolol 100 milliGRAM(s) Oral two times a day  isosorbide   mononitrate ER Tablet (IMDUR) 30 milliGRAM(s) Oral daily  nitroglycerin  Infusion 10 MICROgram(s)/Min IV Continuous <Continuous>  HYDROmorphone  Injectable 1 milliGRAM(s) IV Push every 4 hours PRN  insulin lispro (HumaLOG) corrective regimen sliding scale   SubCutaneous every 6 hours  phenylephrine    Infusion 0.1 MICROgram(s)/kG/Min IV Continuous <Continuous>  propofol Infusion 0.1 MICROgram(s)/kG/Min IV Continuous <Continuous>  furosemide Infusion 15 mG/Hr IV Continuous <Continuous>    LABS:                        12.0   12.9  )-----------( 226      ( 10 Sep 2017 05:12 )             36.6     09-10    133<L>  |  100  |  90<H>  ----------------------------<  180<H>  4.4   |  17<L>  |  8.23<H>    Ca    9.0      10 Sep 2017 05:12  Phos  6.3     09-10  Mg     2.4     09-10    TPro  7.6  /  Alb  2.8<L>  /  TBili  0.6  /  DBili  x   /  AST  26  /  ALT  11  /  AlkPhos  75  09-10    PT/INR - ( 10 Sep 2017 05:12 )   PT: 13.7 sec;   INR: 1.25 ratio         PTT - ( 10 Sep 2017 05:12 )  PTT:35.7 sec    Magnesium, Serum: 2.4 mg/dL (09-10 @ 05:12)  Phosphorus Level, Serum: 6.3 mg/dL (09-10 @ 05:12)    ASSESSMENT AND PLAN:     Problem/Recommendation - 1:  Problem: LIN (acute kidney injury). Worsening of renal function with severe pulmonary congestion requiring intubation.  Family consented for dialysis. Risks, benefits, complications and alternatives explained.  -Surgery for temporary HD catheter placement  -HD today with UF as tolerated.  -Avoid Nephrotoxic Meds/ Agents such as (NSAIDs, IV contrast, Aminoglycosides such as gentamicin, -Gadolinium contrast, Phosphate containing enemas, etc..)  -Adjust Medications according to eGFR    Problem/Recommendation - 2:  ·  Problem: Chronic kidney disease, unspecified CKD stage secondary to dm/htn    Problem/Recommendation - 3:  ·  Problem: Acidosis, metabolic.  Recommendation: mild , secondary to lin on ckd  -f/o co2 daily    Problem/Recommendation - 4:  ·  Problem: Renal stones.  Recommendation: hx of recurrent renal stones /ureteral stones, with rt.mild hydro  -plan as per urology  -pt will need renal stone w/u as outpatient    Problem/Recommendation - 5:  ·  Problem: Hypertension secondary to other renal disorders.   -Titrate BP medication to keep systolic bp <140.     Problem/Recommendation - 6:  - Secondary Hyperparathyroidism. No need for Rx.  - Observe PTH.  Above discussed with family at bedside.  Time spent 40 min.

## 2017-09-10 NOTE — DIETITIAN INITIAL EVALUATION ADULT. - PROBLEM SELECTOR PLAN 3
LIN on CKD, last creatinine 2.1 in 2016  CT abdomen/pelvis: right hydronephrosis, right ureteral stone; bilateral renal calculi, left renal atrophy.  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  patient refused james  - check urine Na, creatinine, calculate FENA  - urology consult tonight - Dr Irvin consulted

## 2017-09-10 NOTE — PROGRESS NOTE ADULT - ATTENDING COMMENTS
-pat with acute NSTEMI with LIN now with total body fluid over load , metabolic acidosis requiring emergent dialysis. Because of renal failure, cardiac cath was not done.  -dialysis cath placed by surgery  -HD today as per renal  -family now agreed for james's  -continue current care]  -antibx for pyelonephritis

## 2017-09-10 NOTE — PROGRESS NOTE ADULT - SUBJECTIVE AND OBJECTIVE BOX
Patient seen and examined.   Time of visit:    MEDICATIONS  (STANDING):  aspirin  chewable 81 milliGRAM(s) Oral daily  cefTRIAXone   IVPB 1 Gram(s) IV Intermittent every 24 hours  buDESOnide  80 MICROgram(s)/formoterol 4.5 MICROgram(s) Inhaler 2 Puff(s) Inhalation two times a day  tamsulosin 0.4 milliGRAM(s) Oral at bedtime  pantoprazole    Tablet 40 milliGRAM(s) Oral before breakfast  atorvastatin 80 milliGRAM(s) Oral at bedtime  insulin lispro (HumaLOG) corrective regimen sliding scale   SubCutaneous Before meals and at bedtime  clopidogrel Tablet 75 milliGRAM(s) Oral daily  metoprolol 100 milliGRAM(s) Oral two times a day  isosorbide   mononitrate ER Tablet (IMDUR) 30 milliGRAM(s) Oral daily  nitroglycerin  Infusion 10 MICROgram(s)/Min (3 mL/Hr) IV Continuous <Continuous>  furosemide Infusion 15 mG/Hr (7.5 mL/Hr) IV Continuous <Continuous>      MEDICATIONS  (PRN):  HYDROmorphone  Injectable 1 milliGRAM(s) IV Push every 4 hours PRN Severe Pain (7 - 10)   Medications up to date at time of exam.      PHYSICAL EXAMINATION:  Patient has no new complaints.  GENERAL: The patient is a well-developed, well-nourished, in no apparent distress.     Vital Signs Last 24 Hrs  T(C): 36.1 (10 Sep 2017 06:17), Max: 37.4 (10 Sep 2017 00:09)  T(F): 96.9 (10 Sep 2017 06:17), Max: 99.4 (10 Sep 2017 00:09)  HR: 109 (10 Sep 2017 08:30) (84 - 117)  BP: 138/79 (10 Sep 2017 08:30) (121/85 - 161/81)  BP(mean): 93 (10 Sep 2017 08:30) (81 - 120)  RR: 14 (10 Sep 2017 08:30) (10 - 30)  SpO2: 88% (10 Sep 2017 08:30) (81% - 96%)   (if applicable)      HEENT: Head is normocephalic and atraumatic. Extraocular muscles are intact. Mucous membranes are moist.     NECK: Supple, no palpable adenopathy.    LUNGS: Bilateral rales    HEART: Regular rate and rhythm II/VI HSM @ LSB    ABDOMEN: Soft, nontender, distended    EXTREMITIES: Without any cyanosis, clubbing, rash, lesions 2+ edema    NEUROLOGIC: Awake, alert, oriented.     SKIN: Warm, dry, good turgor.      LABS:                        12.0   12.9  )-----------( 226      ( 10 Sep 2017 05:12 )             36.6     09-10    133<L>  |  100  |  90<H>  ----------------------------<  180<H>  4.4   |  17<L>  |  8.23<H>    Ca    9.0      10 Sep 2017 05:12  Phos  6.3     09-10  Mg     2.4     09-10    TPro  7.6  /  Alb  2.8<L>  /  TBili  0.6  /  DBili  x   /  AST  26  /  ALT  11  /  AlkPhos  75  09-10    PT/INR - ( 10 Sep 2017 05:12 )   PT: 13.7 sec;   INR: 1.25 ratio         PTT - ( 10 Sep 2017 05:12 )  PTT:35.7 sec    ABG - ( 10 Sep 2017 07:18 )  pH: 7.33  /  pCO2: 34    /  pO2: 61    / HCO3: 17    / Base Excess: -7.2  /  SaO2: 88                CARDIAC MARKERS ( 08 Sep 2017 15:14 )  0.885 ng/mL / x     / 94 U/L / x     / 2.2 ng/mL                MICROBIOLOGY: (if applicable)    RADIOLOGY & ADDITIONAL STUDIES:  EKG:   CXR:  ECHO:    IMPRESSION: 72y Male PAST MEDICAL & SURGICAL HISTORY:  HLD (hyperlipidemia)  Anemia  Acute renal failure  CKD (chronic kidney disease)  COPD (chronic obstructive pulmonary disease)  Fainting  Cardiac arrhythmia  Dizziness  Hydronephrosis  Cholecystitis  Bipolar 1 disorder  DM (diabetes mellitus)  HTN (hypertension)  Lumbar radiculopathy  Left heart failure  Reflux esophagitis  Coronary atherosclerosis  No significant past surgical history   p/w     RECOMMENDATIONS:  Pt is currently in acute renal failure with creatinine increasing to 8.2  He is fluid overloaded with no urine output  He will need urgent hemodialysis   Will discuss with the daughter patient will most likely need a cardiac cath.  He has been refusing the cath up until now.

## 2017-09-10 NOTE — PROGRESS NOTE ADULT - PROBLEM SELECTOR PLAN 4
fever, chills, vomiting, perinephric stranding on CT scan, + R CVAT  UA positive nitrite/LE/WBC  - ceftriaxone   - follow up UCx and Bcx fever, chills, vomiting, perinephric stranding on CT scan, + R CVAT  UA positive nitrite/LE/WBC  - ceftriaxone day 5  - follow up UCx and Bcx- negative up to date

## 2017-09-10 NOTE — PROGRESS NOTE ADULT - SUBJECTIVE AND OBJECTIVE BOX
Patient is seen and examined at the bed side, is afebrile. He is s/p intubated after clinically deteriorate yesterday, family agrees to intubation and james catheter.  He has also started on dialysis.            ROS: Unable to obtain due to intubation              ICU Vital Signs Last 24 Hrs  T(C): 36.3 (10 Sep 2017 16:00), Max: 37.4 (10 Sep 2017 00:09)  T(F): 97.3 (10 Sep 2017 16:00), Max: 99.4 (10 Sep 2017 00:09)  HR: 81 (10 Sep 2017 16:30) (70 - 128)  BP: 129/70 (10 Sep 2017 16:30) (109/59 - 161/139)  BP(mean): 85 (10 Sep 2017 16:30) (72 - 145)  ABP: --  ABP(mean): --  RR: 16 (10 Sep 2017 16:30) (10 - 28)  SpO2: 100% (10 Sep 2017 16:30) (83% - 100%)            PHYSICAL EXAMINATION:      GENERAL: Intubated /sedated  HEENT: ET tube in placed  CVS: s1 and s2 present  RESP: Air entry b/L  GI: RLQ tenderness > LLQ tenderness  : james catheter in placed  EXTREMITIES: No pedal edema.  NEUROLOGIC: intubated/sedated                 LABS:                        12.0   12.9  )-----------( 226      ( 10 Sep 2017 05:12 )             36.6                           10.7   11.6  )-----------( 216      ( 09 Sep 2017 04:08 )             32.2                           11.5   12.5  )-----------( 208      ( 08 Sep 2017 06:32 )             34.7             09-10    133<L>  |  100  |  90<H>  ----------------------------<  180<H>  4.4   |  17<L>  |  8.23<H>    Ca    9.0      10 Sep 2017 05:12  Phos  6.3     09-10  Mg     2.4     09-10    TPro  7.6  /  Alb  2.8<L>  /  TBili  0.6  /  DBili  x   /  AST  26  /  ALT  11  /  AlkPhos  75  09-10         PTT - ( 08 Sep 2017 06:31 )  PTT:67.0 sec  Urinalysis Basic - ( 07 Sep 2017 19:24 )    Color: Yellow / Appearance: Clear / S.020 / pH: x  Gluc: x / Ketone: Trace  / Bili: Negative / Urobili: Negative   Blood: x / Protein: 100 / Nitrite: Negative   Leuk Esterase: Small / RBC: >50 /HPF / WBC 6-10 /HPF   Sq Epi: x / Non Sq Epi: Few / Bacteria: Moderate /HPF      ABG - ( 08 Sep 2017 04:51 )  pH: 7.37  /  pCO2: 36    /  pO2: 116   / HCO3: 21    / Base Excess: -3.6  /  SaO2: 98                    MEDICATIONS  (STANDING):  aspirin  chewable 81 milliGRAM(s) Oral daily  cefTRIAXone   IVPB 1 Gram(s) IV Intermittent every 24 hours  buDESOnide  80 MICROgram(s)/formoterol 4.5 MICROgram(s) Inhaler 2 Puff(s) Inhalation two times a day  tamsulosin 0.4 milliGRAM(s) Oral at bedtime  pantoprazole    Tablet 40 milliGRAM(s) Oral before breakfast  atorvastatin 80 milliGRAM(s) Oral at bedtime  insulin lispro (HumaLOG) corrective regimen sliding scale   SubCutaneous every 6 hours  phenylephrine    Infusion 0.1 MICROgram(s)/kG/Min (1.663 mL/Hr) IV Continuous <Continuous>  propofol Infusion 0.1 MICROgram(s)/kG/Min (0.053 mL/Hr) IV Continuous <Continuous>  clopidogrel Tablet 75 milliGRAM(s) Oral daily  heparin  Injectable 5000 Unit(s) SubCutaneous every 12 hours  furosemide Infusion 15 mG/Hr (7.5 mL/Hr) IV Continuous <Continuous>                MICROBIOLOGY DATA:        Culture - Blood (17 @ 10:02)    Specimen Source: .Blood Blood-Peripheral    Culture Results:   No growth to date.    Culture - Urine (17 @ 23:53)    Specimen Source: .Urine Clean Catch (Midstream)    Culture Results:   No growth        RADIOLOGY DATA:      17: CT Abdomen and Pelvis No Cont (17 @ 21:23) : Two RIGHT proximal ureteral calculi measuring 7 mm and 8 mm. Mild RIGHT hydronephrosis.  Atrophic left kidney, possibly chronic. Additional nonobstructive bilateral renal calculi. Left ureterovesical junction calculi measuring up to 12 mm. No left   hydronephrosis. Small right pleural effusion.      17 : Xray Chest 1 View AP- PORTABLE-Urgent (17 @ 20:15) Impression: New airspace opacification in the right mid to lower lung zone for which clinical correlation with pneumonia is recommended. No gross pleural effusion or pneumothorax. Sk[place]old on the left. Stable cardiac silhouette.        < from: Transthoracic Echocardiogram (17 @ 06:51) >  CONCLUSIONS:  1. Mitral annular calcification, and calcified mitral  leaflets with normal diastolic opening. Mild mitral  regurgitation.  2. Aortic valve leaflet morphology not well visualized,  appears calcified with reduced opening. Peak transaortic  valve gradient equals 19 mm Hg, mean transaortic valve  gradient equals 10 mm Hg, consistent with mild aortic  stenosis. Trace aortic regurgitation.  3. Normal left ventricular internal dimensions and wall  thicknesses.  4. Severe segmental left ventricular dysfunction.  The mid  to distal anterior wall, distal inferior wall, anteroseptum  and apex are akinetic. Moderate diastolic dysfunction  (Stage II).  5. Normal right ventricular size and function.  6. RVS Pressure is 46 mm Hg.  Mild pulmonary hypertension.

## 2017-09-10 NOTE — PROGRESS NOTE ADULT - SUBJECTIVE AND OBJECTIVE BOX
INTERVAL HPI/OVERNIGHT EVENTS: Overnight pt continued to have episodic CP, and was agitated.  Nitro drip was restarted.       Antimicrobial:  cefTRIAXone   IVPB 1 Gram(s) IV Intermittent every 24 hours    Cardiovascular:  tamsulosin 0.4 milliGRAM(s) Oral at bedtime  metoprolol 75 milliGRAM(s) Oral two times a day  nitroglycerin  Infusion 10 MICROgram(s)/Min IV Continuous <Continuous>  NIFEdipine XL 60 milliGRAM(s) Oral daily    Pulmonary:  buDESOnide  80 MICROgram(s)/formoterol 4.5 MICROgram(s) Inhaler 2 Puff(s) Inhalation two times a day    Hematalogic:  aspirin  chewable 81 milliGRAM(s) Oral daily  clopidogrel Tablet 75 milliGRAM(s) Oral daily  heparin  Infusion. 900 Unit(s)/Hr IV Continuous <Continuous>    Other:  HYDROmorphone  Injectable 0.5 milliGRAM(s) IV Push every 3 hours PRN  pantoprazole    Tablet 40 milliGRAM(s) Oral before breakfast  atorvastatin 80 milliGRAM(s) Oral at bedtime  insulin lispro (HumaLOG) corrective regimen sliding scale   SubCutaneous Before meals and at bedtime    HYDROmorphone  Injectable 0.5 milliGRAM(s) IV Push every 3 hours PRN  aspirin  chewable 81 milliGRAM(s) Oral daily  cefTRIAXone   IVPB 1 Gram(s) IV Intermittent every 24 hours  buDESOnide  80 MICROgram(s)/formoterol 4.5 MICROgram(s) Inhaler 2 Puff(s) Inhalation two times a day  tamsulosin 0.4 milliGRAM(s) Oral at bedtime  pantoprazole    Tablet 40 milliGRAM(s) Oral before breakfast  atorvastatin 80 milliGRAM(s) Oral at bedtime  insulin lispro (HumaLOG) corrective regimen sliding scale   SubCutaneous Before meals and at bedtime  clopidogrel Tablet 75 milliGRAM(s) Oral daily  heparin  Infusion. 900 Unit(s)/Hr IV Continuous <Continuous>  metoprolol 75 milliGRAM(s) Oral two times a day  nitroglycerin  Infusion 10 MICROgram(s)/Min IV Continuous <Continuous>  NIFEdipine XL 60 milliGRAM(s) Oral daily    Drug Dosing Weight  Height (cm): 172.72 (06 Sep 2017 21:30)  Weight (kg): 88.7 (06 Sep 2017 21:30)  BMI (kg/m2): 29.7 (06 Sep 2017 21:30)  BSA (m2): 2.02 (06 Sep 2017 21:30)    CENTRAL LINE: [ ] YES [x ] NO  LOCATION:   DATE INSERTED:  REMOVE: [ ] YES [ ] NO  EXPLAIN:    CERVANTES: [ ] YES [x ] NO    DATE INSERTED:  REMOVE:  [ ] YES [ ] NO  EXPLAIN:    A-LINE:  [ ] YES [x ] NO  LOCATION:   DATE INSERTED:  REMOVE:  [ ] YES [ ] NO  EXPLAIN:    PMH -reviewed admission note, no change since admission    ICU Vital Signs Last 24 Hrs  T(C): 37.4 (10 Sep 2017 00:09), Max: 37.4 (10 Sep 2017 00:09)  T(F): 99.4 (10 Sep 2017 00:09), Max: 99.4 (10 Sep 2017 00:09)  HR: 113 (10 Sep 2017 01:00) (84 - 117)  BP: 143/112 (10 Sep 2017 01:00) (128/75 - 161/81)  BP(mean): 120 (10 Sep 2017 01:00) (81 - 120)  ABP: --  ABP(mean): --  RR: 26 (10 Sep 2017 01:00) (17 - 30)  SpO2: 89% (10 Sep 2017 01:00) (81% - 95%)      ABG - ( 08 Sep 2017 04:51 )  pH: 7.37  /  pCO2: 36    /  pO2: 116   / HCO3: 21    / Base Excess: -3.6  /  SaO2: 98              PHYSICAL EXAM:    GENERAL: [x ]NAD, [x ]well-groomed, [x ]well-developed  HEAD:  [ x]Atraumatic, [ x]Normocephalic  EYES: [x ]EOMI, [x ]PERRLA, [ ]conjunctiva and sclera clear  ENMT: [x ]No tonsillar erythema, exudates, or enlargement; [ ]Moist mucous membranes, [ ]Good dentition, [ ]No lesions  NECK: [ x]Supple, normal appearance, [ ]No JVD; [ ]Normal thyroid; [ ]Trachea midline  NERVOUS SYSTEM:  [x ]Alert & Oriented X3, [x ]Good concentration; [ ]Motor Strength 5/5 B/L upper and lower extremities; [ ]DTRs 2+ intact and symmetric  CHEST/LUNG: [x ]No chest deformity; [ ]Normal percussion bilaterally; [ ]No rales, rhonchi, wheezing   HEART: [x ]Regular rate and rhythm; [ ]No murmurs, rubs, or gallops  ABDOMEN: [x ]Soft, Nontender, Nondistended; [ ]Bowel sounds present  EXTREMITIES:  [ x]2+ Peripheral Pulses, [ ]No clubbing, cyanosis, or edema  LYMPH: [x ]No lymphadenopathy noted  SKIN: [ x]No rashes or lesions; [ ]Good capillary refill          LABS:                   Color: Yellow / Appearance: Clear / S.020 / pH: x  Gluc: x / Ketone: Trace  / Bili: Negative / Urobili: Negative   Blood: x / Protein: 100 / Nitrite: Negative   Leuk Esterase: Small / RBC: >50 /HPF / WBC 6-10 /HPF   Sq Epi: x / Non Sq Epi: Few / Bacteria: Moderate /HPF          RADIOLOGY & ADDITIONAL STUDIES REVIEWED:  ***    [ ]GOALS OF CARE DISCUSSION WITH PATIENT/FAMILY/PROXY:    CRITICAL CARE TIME SPENT: 35 minutes INTERVAL HPI/OVERNIGHT EVENTS: Overnight pt continued to have episodic CP, and was agitated.  Nitro drip was restarted.       Antimicrobial:  cefTRIAXone   IVPB 1 Gram(s) IV Intermittent every 24 hours    Cardiovascular:  tamsulosin 0.4 milliGRAM(s) Oral at bedtime  metoprolol 75 milliGRAM(s) Oral two times a day  nitroglycerin  Infusion 10 MICROgram(s)/Min IV Continuous <Continuous>  NIFEdipine XL 60 milliGRAM(s) Oral daily    Pulmonary:  buDESOnide  80 MICROgram(s)/formoterol 4.5 MICROgram(s) Inhaler 2 Puff(s) Inhalation two times a day    Hematalogic:  aspirin  chewable 81 milliGRAM(s) Oral daily  clopidogrel Tablet 75 milliGRAM(s) Oral daily  heparin  Infusion. 900 Unit(s)/Hr IV Continuous <Continuous>    Other:  HYDROmorphone  Injectable 0.5 milliGRAM(s) IV Push every 3 hours PRN  pantoprazole    Tablet 40 milliGRAM(s) Oral before breakfast  atorvastatin 80 milliGRAM(s) Oral at bedtime  insulin lispro (HumaLOG) corrective regimen sliding scale   SubCutaneous Before meals and at bedtime    HYDROmorphone  Injectable 0.5 milliGRAM(s) IV Push every 3 hours PRN  aspirin  chewable 81 milliGRAM(s) Oral daily  cefTRIAXone   IVPB 1 Gram(s) IV Intermittent every 24 hours  buDESOnide  80 MICROgram(s)/formoterol 4.5 MICROgram(s) Inhaler 2 Puff(s) Inhalation two times a day  tamsulosin 0.4 milliGRAM(s) Oral at bedtime  pantoprazole    Tablet 40 milliGRAM(s) Oral before breakfast  atorvastatin 80 milliGRAM(s) Oral at bedtime  insulin lispro (HumaLOG) corrective regimen sliding scale   SubCutaneous Before meals and at bedtime  clopidogrel Tablet 75 milliGRAM(s) Oral daily  heparin  Infusion. 900 Unit(s)/Hr IV Continuous <Continuous>  metoprolol 75 milliGRAM(s) Oral two times a day  nitroglycerin  Infusion 10 MICROgram(s)/Min IV Continuous <Continuous>  NIFEdipine XL 60 milliGRAM(s) Oral daily    Drug Dosing Weight  Height (cm): 172.72 (06 Sep 2017 21:30)  Weight (kg): 88.7 (06 Sep 2017 21:30)  BMI (kg/m2): 29.7 (06 Sep 2017 21:30)  BSA (m2): 2.02 (06 Sep 2017 21:30)    CENTRAL LINE: [ ] YES [x ] NO  LOCATION:   DATE INSERTED:  REMOVE: [ ] YES [ ] NO  EXPLAIN:    CERVANTES: [ ] YES [x ] NO    DATE INSERTED:  REMOVE:  [ ] YES [ ] NO  EXPLAIN:    A-LINE:  [ ] YES [x ] NO  LOCATION:   DATE INSERTED:  REMOVE:  [ ] YES [ ] NO  EXPLAIN:    PMH -reviewed admission note, no change since admission    ICU Vital Signs Last 24 Hrs  T(C): 37.4 (10 Sep 2017 00:09), Max: 37.4 (10 Sep 2017 00:09)  T(F): 99.4 (10 Sep 2017 00:09), Max: 99.4 (10 Sep 2017 00:09)  HR: 113 (10 Sep 2017 01:00) (84 - 117)  BP: 143/112 (10 Sep 2017 01:00) (128/75 - 161/81)  BP(mean): 120 (10 Sep 2017 01:00) (81 - 120)  ABP: --  ABP(mean): --  RR: 26 (10 Sep 2017 01:00) (17 - 30)  SpO2: 89% (10 Sep 2017 01:00) (81% - 95%)      ABG - ( 08 Sep 2017 04:51 )  pH: 7.37  /  pCO2: 36    /  pO2: 116   / HCO3: 21    / Base Excess: -3.6  /  SaO2: 98              PHYSICAL EXAM:    GENERAL: [x ]NAD, [x ]well-groomed, [x ]well-developed  HEAD:  [ x]Atraumatic, [ x]Normocephalic  EYES: [x ]EOMI, [x ]PERRLA, [ ]conjunctiva and sclera clear  ENMT: [x ]No tonsillar erythema, exudates, or enlargement; [ ]Moist mucous membranes, [ ]Good dentition, [ ]No lesions  NECK: [ x]Supple, normal appearance, [ ]No JVD; [ ]Normal thyroid; [ ]Trachea midline  NERVOUS SYSTEM:  [x ]Alert & Oriented X3, [x ]Good concentration; [ ]Motor Strength 5/5 B/L upper and lower extremities; [ ]DTRs 2+ intact and symmetric  CHEST/LUNG: [x ]No chest deformity; [ ]Normal percussion bilaterally; [ ]No rales, rhonchi, wheezing   HEART: [x ]Regular rate and rhythm; [ ]No murmurs, rubs, or gallops  ABDOMEN: [x ]Soft, Nontender, Nondistended; [ ]Bowel sounds present  EXTREMITIES:  [ x]2+ Peripheral Pulses, [ ]No clubbing, cyanosis, or edema  LYMPH: [x ]No lymphadenopathy noted  SKIN: [ x]No rashes or lesions; [ ]Good capillary refill          LABS:    Labs:                        12.0   12.9  )-----------( 226      ( 10 Sep 2017 05:12 )             36.6     09-10    133<L>  |  100  |  90<H>  ----------------------------<  180<H>  4.4   |  17<L>  |  8.23<H>    Ca    9.0      10 Sep 2017 05:12  Phos  6.3     09-10  Mg     2.4     09-10    TPro  7.6  /  Alb  2.8<L>  /  TBili  0.6  /  DBili  x   /  AST  26  /  ALT  11  /  AlkPhos  75  -10    09-07 Chol 171  HDL 36<L> Trig 153<H>                 Color: Yellow / Appearance: Clear / S.020 / pH: x  Gluc: x / Ketone: Trace  / Bili: Negative / Urobili: Negative   Blood: x / Protein: 100 / Nitrite: Negative   Leuk Esterase: Small / RBC: >50 /HPF / WBC 6-10 /HPF   Sq Epi: x / Non Sq Epi: Few / Bacteria: Moderate /HPF          RADIOLOGY & ADDITIONAL STUDIES REVIEWED:  ***    [ ]GOALS OF CARE DISCUSSION WITH PATIENT/FAMILY/PROXY:    CRITICAL CARE TIME SPENT: 35 minutes

## 2017-09-10 NOTE — PROGRESS NOTE ADULT - SUBJECTIVE AND OBJECTIVE BOX
· Subjective and Objective: 	  INTERVAL HPI/OVERNIGHT EVENTS: Overnight pt continued to have episodic CP, and was agitated.  Nitro drip was restarted.   Patient became a gitated,agressive,confused  and may require intubation,family was notified and agreed with plan    Antimicrobial:  cefTRIAXone   IVPB 1 Gram(s) IV Intermittent every 24 hours    Cardiovascular:  tamsulosin 0.4 milliGRAM(s) Oral at bedtime  metoprolol 75 milliGRAM(s) Oral two times a day  nitroglycerin  Infusion 10 MICROgram(s)/Min IV Continuous <Continuous>  NIFEdipine XL 60 milliGRAM(s) Oral daily    Pulmonary:  buDESOnide  80 MICROgram(s)/formoterol 4.5 MICROgram(s) Inhaler 2 Puff(s) Inhalation two times a day    Hematalogic:  aspirin  chewable 81 milliGRAM(s) Oral daily  clopidogrel Tablet 75 milliGRAM(s) Oral daily  heparin  Infusion. 900 Unit(s)/Hr IV Continuous <Continuous>    Other:  HYDROmorphone  Injectable 0.5 milliGRAM(s) IV Push every 3 hours PRN  pantoprazole    Tablet 40 milliGRAM(s) Oral before breakfast  atorvastatin 80 milliGRAM(s) Oral at bedtime  insulin lispro (HumaLOG) corrective regimen sliding scale   SubCutaneous Before meals and at bedtime    HYDROmorphone  Injectable 0.5 milliGRAM(s) IV Push every 3 hours PRN  aspirin  chewable 81 milliGRAM(s) Oral daily  cefTRIAXone   IVPB 1 Gram(s) IV Intermittent every 24 hours  buDESOnide  80 MICROgram(s)/formoterol 4.5 MICROgram(s) Inhaler 2 Puff(s) Inhalation two times a day  tamsulosin 0.4 milliGRAM(s) Oral at bedtime  pantoprazole    Tablet 40 milliGRAM(s) Oral before breakfast  atorvastatin 80 milliGRAM(s) Oral at bedtime  insulin lispro (HumaLOG) corrective regimen sliding scale   SubCutaneous Before meals and at bedtime  clopidogrel Tablet 75 milliGRAM(s) Oral daily  heparin  Infusion. 900 Unit(s)/Hr IV Continuous <Continuous>  metoprolol 75 milliGRAM(s) Oral two times a day  nitroglycerin  Infusion 10 MICROgram(s)/Min IV Continuous <Continuous>  NIFEdipine XL 60 milliGRAM(s) Oral daily    Drug Dosing Weight  Height (cm): 172.72 (06 Sep 2017 21:30)  Weight (kg): 88.7 (06 Sep 2017 21:30)  BMI (kg/m2): 29.7 (06 Sep 2017 21:30)  BSA (m2): 2.02 (06 Sep 2017 21:30)    CENTRAL LINE: [ ] YES [x ] NO  LOCATION:   DATE INSERTED:  REMOVE: [ ] YES [ ] NO  EXPLAIN:    JAMES: [ ] YES [x ] NO    DATE INSERTED:  REMOVE:  [ ] YES [ ] NO  EXPLAIN:    A-LINE:  [ ] YES [x ] NO  LOCATION:   DATE INSERTED:  REMOVE:  [ ] YES [ ] NO  EXPLAIN:    PMH -reviewed admission note, no change since admission    ICU Vital Signs Last 24 Hrs  T(C): 37.4 (10 Sep 2017 00:09), Max: 37.4 (10 Sep 2017 00:09)  T(F): 99.4 (10 Sep 2017 00:09), Max: 99.4 (10 Sep 2017 00:09)  HR: 113 (10 Sep 2017 01:00) (84 - 117)  BP: 143/112 (10 Sep 2017 01:00) (128/75 - 161/81)  BP(mean): 120 (10 Sep 2017 01:00) (81 - 120)  ABP: --  ABP(mean): --  RR: 26 (10 Sep 2017 01:00) (17 - 30)  SpO2: 89% (10 Sep 2017 01:00) (81% - 95%)      ABG - ( 08 Sep 2017 04:51 )  pH: 7.37  /  pCO2: 36    /  pO2: 116   / HCO3: 21    / Base Excess: -3.6  /  SaO2: 98              PHYSICAL EXAM:    GENERAL: [x ]NAD, [x ]well-groomed, [x ]well-developed  HEAD:  [ x]Atraumatic, [ x]Normocephalic  EYES: [x ]EOMI, [x ]PERRLA, [ ]conjunctiva and sclera clear  ENMT: [x ]No tonsillar erythema, exudates, or enlargement; [ ]Moist mucous membranes, [ ]Good dentition, [ ]No lesions  NECK: [ x]Supple, normal appearance, [ ]No JVD; [ ]Normal thyroid; [ ]Trachea midline  NERVOUS SYSTEM:  [x ]Alert & Oriented X3, [x ]Good concentration; [ ]Motor Strength 5/5 B/L upper and lower extremities; [ ]DTRs 2+ intact and symmetric  CHEST/LUNG: [x ]No chest deformity; [ ]Normal percussion bilaterally; [ ]No rales, rhonchi, wheezing   HEART: [x ]Regular rate and rhythm; [ ]No murmurs, rubs, or gallops  ABDOMEN: [x ]Soft, Nontender, Nondistended; [ ]Bowel sounds present  EXTREMITIES:  [ x]2+ Peripheral Pulses, [ ]No clubbing, cyanosis, or edema  LYMPH: [x ]No lymphadenopathy noted  SKIN: [ x]No rashes or lesions; [ ]Good capillary refill          LABS:    Labs:                        12.0   12.9  )-----------( 226      ( 10 Sep 2017 05:12 )             36.6     09-10    133<L>  |  100  |  90<H>  ----------------------------<  180<H>  4.4   |  17<L>  |  8.23<H>    Ca    9.0      10 Sep 2017 05:12  Phos  6.3     09-10  Mg     2.4     09-10    TPro  7.6  /  Alb  2.8<L>  /  TBili  0.6  /  DBili  x   /  AST  26  /  ALT  11  /  AlkPhos  75  09-10    09-07 Chol 171  HDL 36<L> Trig 153<H>                 Color: Yellow / Appearance: Clear / S.020 / pH: x  Gluc: x / Ketone: Trace  / Bili: Negative / Urobili: Negative   Blood: x / Protein: 100 / Nitrite: Negative   Leuk Esterase: Small / RBC: >50 /HPF / WBC 6-10 /HPF   Sq Epi: x / Non Sq Epi: Few / Bacteria: Moderate /HPF          RADIOLOGY & ADDITIONAL STUDIES REVIEWED:  ***    [ ]GOALS OF CARE DISCUSSION WITH PATIENT/FAMILY/PROXY:    CRITICAL CARE TIME SPENT: 35 minutes   INTERVAL HPI/OVERNIGHT EVENTS: Overnight pt continued to have episodic CP, and was agitated.  Nitro drip was restarted.       Antimicrobial:  cefTRIAXone   IVPB 1 Gram(s) IV Intermittent every 24 hours    Cardiovascular:  tamsulosin 0.4 milliGRAM(s) Oral at bedtime  metoprolol 75 milliGRAM(s) Oral two times a day  nitroglycerin  Infusion 10 MICROgram(s)/Min IV Continuous <Continuous>  NIFEdipine XL 60 milliGRAM(s) Oral daily    Pulmonary:  buDESOnide  80 MICROgram(s)/formoterol 4.5 MICROgram(s) Inhaler 2 Puff(s) Inhalation two times a day    Hematalogic:  aspirin  chewable 81 milliGRAM(s) Oral daily  clopidogrel Tablet 75 milliGRAM(s) Oral daily  heparin  Infusion. 900 Unit(s)/Hr IV Continuous <Continuous>    Other:  HYDROmorphone  Injectable 0.5 milliGRAM(s) IV Push every 3 hours PRN  pantoprazole    Tablet 40 milliGRAM(s) Oral before breakfast  atorvastatin 80 milliGRAM(s) Oral at bedtime  insulin lispro (HumaLOG) corrective regimen sliding scale   SubCutaneous Before meals and at bedtime    HYDROmorphone  Injectable 0.5 milliGRAM(s) IV Push every 3 hours PRN  aspirin  chewable 81 milliGRAM(s) Oral daily  cefTRIAXone   IVPB 1 Gram(s) IV Intermittent every 24 hours  buDESOnide  80 MICROgram(s)/formoterol 4.5 MICROgram(s) Inhaler 2 Puff(s) Inhalation two times a day  tamsulosin 0.4 milliGRAM(s) Oral at bedtime  pantoprazole    Tablet 40 milliGRAM(s) Oral before breakfast  atorvastatin 80 milliGRAM(s) Oral at bedtime  insulin lispro (HumaLOG) corrective regimen sliding scale   SubCutaneous Before meals and at bedtime  clopidogrel Tablet 75 milliGRAM(s) Oral daily  heparin  Infusion. 900 Unit(s)/Hr IV Continuous <Continuous>  metoprolol 75 milliGRAM(s) Oral two times a day  nitroglycerin  Infusion 10 MICROgram(s)/Min IV Continuous <Continuous>  NIFEdipine XL 60 milliGRAM(s) Oral daily    Drug Dosing Weight  Height (cm): 172.72 (06 Sep 2017 21:30)  Weight (kg): 88.7 (06 Sep 2017 21:30)  BMI (kg/m2): 29.7 (06 Sep 2017 21:30)  BSA (m2): 2.02 (06 Sep 2017 21:30)    CENTRAL LINE: [ ] YES [x ] NO  LOCATION:   DATE INSERTED:  REMOVE: [ ] YES [ ] NO  EXPLAIN:    JAMES: [ ] YES [x ] NO    DATE INSERTED:  REMOVE:  [ ] YES [ ] NO  EXPLAIN:    A-LINE:  [ ] YES [x ] NO  LOCATION:   DATE INSERTED:  REMOVE:  [ ] YES [ ] NO  EXPLAIN:    PMH -reviewed admission note, no change since admission    ICU Vital Signs Last 24 Hrs  T(C): 37.4 (10 Sep 2017 00:09), Max: 37.4 (10 Sep 2017 00:09)  T(F): 99.4 (10 Sep 2017 00:09), Max: 99.4 (10 Sep 2017 00:09)  HR: 113 (10 Sep 2017 01:) (84 - 117)  BP: 143/112 (10 Sep 2017 01:) (128/75 - 161/81)  BP(mean): 120 (10 Sep 2017 01:) (81 - 120)  ABP: --  ABP(mean): --  RR: 26 (10 Sep 2017 01:) (17 - 30)  SpO2: 89% (10 Sep 2017 01:) (81% - 95%)      ABG - ( 08 Sep 2017 04:51 )  pH: 7.37  /  pCO2: 36    /  pO2: 116   / HCO3: 21    / Base Excess: -3.6  /  SaO2: 98              PHYSICAL EXAM:    GENERAL: [x ]NAD, [x ]well-groomed, [x ]well-developed  HEAD:  [ x]Atraumatic, [ x]Normocephalic  EYES: [x ]EOMI, [x ]PERRLA, [ ]conjunctiva and sclera clear  ENMT: [x ]No tonsillar erythema, exudates, or enlargement; [ ]Moist mucous membranes, [ ]Good dentition, [ ]No lesions  NECK: [ x]Supple, normal appearance, [ ]No JVD; [ ]Normal thyroid; [ ]Trachea midline  NERVOUS SYSTEM:  [x ]Alert & Oriented X3, [x ]Good concentration; [ ]Motor Strength 5/5 B/L upper and lower extremities; [ ]DTRs 2+ intact and symmetric  CHEST/LUNG: [x ]No chest deformity; [ ]Normal percussion bilaterally; [ ]No rales, rhonchi, wheezing   HEART: [x ]Regular rate and rhythm; [ ]No murmurs, rubs, or gallops  ABDOMEN: [x ]Soft, Nontender, Nondistended; [ ]Bowel sounds present  EXTREMITIES:  [ x]2+ Peripheral Pulses, [ ]No clubbing, cyanosis, or edema  LYMPH: [x ]No lymphadenopathy noted  SKIN: [ x]No rashes or lesions; [ ]Good capillary refill          LABS:                   Color: Yellow / Appearance: Clear / S.020 / pH: x  Gluc: x / Ketone: Trace  / Bili: Negative / Urobili: Negative   Blood: x / Protein: 100 / Nitrite: Negative   Leuk Esterase: Small / RBC: >50 /HPF / WBC 6-10 /HPF   Sq Epi: x / Non Sq Epi: Few / Bacteria: Moderate /HPF          RADIOLOGY & ADDITIONAL STUDIES REVIEWED:  ***    [ ]GOALS OF CARE DISCUSSION WITH PATIENT/FAMILY/PROXY:    CRITICAL CARE TIME SPENT: 35 minutes     Assessment and Plan:   · Assessment		  71yo male, [place] speaking, wife is current patient in ICU, daughter at bedside to [facility], [facility] CAD s/p 12-14 stents (placed [id number]), DM, HTN, HLD, CVA (4 yrs ago, residual left sided facial weakness), lumbar radiculopathy with herniated disc, COPD (never smoker, passive smoke exposure), CKD (last creatinine 2 months ago was 1.2), bipolar disorder (no meds, sees psychiatrist), chronic kidney stones (has had ureteral stent in past) p/w 2 days intractable right flank pain radiating to groin, chills, vomiting 2/2 kidney stones/UTI, rule out pyelonephritis/obstruction.  Patient with NSTEMI with unstable angina and TWI lateral leads     Problem/Plan - 1:  ·  Problem: NSTEMI (non-ST elevated myocardial infarction)./Respiratory failure-kev be placed on respirator    Plan: patient presented with unstable angina in ED  TWI precordial and lateral leads, elevated troponin  currently, with minimal chest pain after ASA, Plavix loading, put back on  NTG, heparin gtt  not as candidate for cath given lin on ckd  - trend troponin: last troponin 1.8->1.5.   - TTE with 35% EF and G2DD  - ASA, statin, BB, heparin gtt  - cardiology consult -Dr Briseno.      Problem/Plan - 2:  ·  Problem: Hypertensive emergency.  Plan: SBP in 140/150, responded to labetalol and lopressor IV  - NGT drip was restated due to patient recurrent chest pain. SBP in 140/150, responded to labetalol and lopressor IV  - NGT drip for 3 hour and switched to procardia and metoprolol.      Problem/Plan - 3:  ·  Problem: LIN (acute kidney injury).  Plan: LIN on CKD, last creatinine increasing to 8.23 with BUN 90.   Patient refused james, dialysis and IR guided nephrostomy.   CT abdomen/pelvis: right hydronephrosis, right ureteral stone; bilateral renal calculi, left renal atrophy.  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    - urology consult tonight - Dr Irvin consulted. LIN on CKD, last creatinine 2.1 in 2016  CT abdomen/pelvis: right hydronephrosis, right ureteral stone; bilateral renal calculi, left renal atrophy.  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  patient refused james  - urology consult tonight - Dr Irvin consulted      Problem/Plan - 4:  ·  Problem: Pyelonephritis.  Plan: fever, chills, vomiting, perinephric stranding on CT scan, + R CVAT  UA positive nitrite/LE/WBC  - ceftriaxone day 5  - follow up UCx and Bcx- negative up to date. fever, chills, vomiting, perinephric stranding on CT scan, + R CVAT  UA positive nitrite/LE/WBC  - ceftriaxone   - follow up UCx and Bcx      Problem/Plan - 5:  ·  Problem: Type 2 diabetes mellitus with diabetic chronic kidney disease, unspecified CKD stage, unspecified long term insulin use status.  Plan: BSL controlled  - hold oral hypoglycemics  - diabetic diet, HSS, BSL controlled  - hold oral hypoglycemics  - diabetic diet, HSS, follow up A1C      Problem/Plan - 6:  Problem: Need for prophylactic measure. Plan: improve score = 2, on heparin gtt  GI ppx.    7.FULL CODE  D/W family abnd agrred with plan of treatment

## 2017-09-10 NOTE — PROGRESS NOTE ADULT - PROBLEM SELECTOR PLAN 1
patient presented with unstable angina in ED  TWI precordial and lateral leads, elevated troponin  currently, with minimal chest pain after ASA, Plavix loading, put back on  NTG, heparin gtt  not as candidate for cath given naman on ckd  - trend troponin: last troponin 1.8->1.5.   - TTE with 35% EF and G2DD  - ASA, statin, BB, heparin gtt  - cardiology consult -Dr Briseno

## 2017-09-11 DIAGNOSIS — E83.39 OTHER DISORDERS OF PHOSPHORUS METABOLISM: ICD-10-CM

## 2017-09-11 DIAGNOSIS — R06.00 DYSPNEA, UNSPECIFIED: ICD-10-CM

## 2017-09-11 LAB
ALBUMIN SERPL ELPH-MCNC: 2.4 G/DL — LOW (ref 3.5–5)
ALP SERPL-CCNC: 78 U/L — SIGNIFICANT CHANGE UP (ref 40–120)
ALT FLD-CCNC: 11 U/L DA — SIGNIFICANT CHANGE UP (ref 10–60)
ANION GAP SERPL CALC-SCNC: 14 MMOL/L — SIGNIFICANT CHANGE UP (ref 5–17)
APTT BLD: 31.7 SEC — SIGNIFICANT CHANGE UP (ref 27.5–37.4)
AST SERPL-CCNC: 35 U/L — SIGNIFICANT CHANGE UP (ref 10–40)
BASE EXCESS BLDA CALC-SCNC: -3 MMOL/L — LOW (ref -2–2)
BASOPHILS # BLD AUTO: 0 K/UL — SIGNIFICANT CHANGE UP (ref 0–0.2)
BASOPHILS NFR BLD AUTO: 0.4 % — SIGNIFICANT CHANGE UP (ref 0–2)
BILIRUB SERPL-MCNC: 0.6 MG/DL — SIGNIFICANT CHANGE UP (ref 0.2–1.2)
BLOOD GAS COMMENTS ARTERIAL: SIGNIFICANT CHANGE UP
BUN SERPL-MCNC: 76 MG/DL — HIGH (ref 7–18)
CALCIUM SERPL-MCNC: 8.8 MG/DL — SIGNIFICANT CHANGE UP (ref 8.4–10.5)
CHLORIDE SERPL-SCNC: 102 MMOL/L — SIGNIFICANT CHANGE UP (ref 96–108)
CO2 SERPL-SCNC: 23 MMOL/L — SIGNIFICANT CHANGE UP (ref 22–31)
CREAT SERPL-MCNC: 6.86 MG/DL — HIGH (ref 0.5–1.3)
EOSINOPHIL # BLD AUTO: 0.1 K/UL — SIGNIFICANT CHANGE UP (ref 0–0.5)
EOSINOPHIL NFR BLD AUTO: 0.6 % — SIGNIFICANT CHANGE UP (ref 0–6)
GLUCOSE SERPL-MCNC: 89 MG/DL — SIGNIFICANT CHANGE UP (ref 70–99)
HCO3 BLDA-SCNC: 21 MMOL/L — LOW (ref 23–27)
HCT VFR BLD CALC: 31.7 % — LOW (ref 39–50)
HGB BLD-MCNC: 10.4 G/DL — LOW (ref 13–17)
HOROWITZ INDEX BLDA+IHG-RTO: 40 — SIGNIFICANT CHANGE UP
INR BLD: 1.21 RATIO — HIGH (ref 0.88–1.16)
LYMPHOCYTES # BLD AUTO: 1.3 K/UL — SIGNIFICANT CHANGE UP (ref 1–3.3)
LYMPHOCYTES # BLD AUTO: 13.1 % — SIGNIFICANT CHANGE UP (ref 13–44)
MAGNESIUM SERPL-MCNC: 2.6 MG/DL — SIGNIFICANT CHANGE UP (ref 1.6–2.6)
MCHC RBC-ENTMCNC: 27.6 PG — SIGNIFICANT CHANGE UP (ref 27–34)
MCHC RBC-ENTMCNC: 32.8 GM/DL — SIGNIFICANT CHANGE UP (ref 32–36)
MCV RBC AUTO: 84.1 FL — SIGNIFICANT CHANGE UP (ref 80–100)
MONOCYTES # BLD AUTO: 0.7 K/UL — SIGNIFICANT CHANGE UP (ref 0–0.9)
MONOCYTES NFR BLD AUTO: 7.3 % — SIGNIFICANT CHANGE UP (ref 2–14)
NEUTROPHILS # BLD AUTO: 8 K/UL — HIGH (ref 1.8–7.4)
NEUTROPHILS NFR BLD AUTO: 78.5 % — HIGH (ref 43–77)
PCO2 BLDA: 34 MMHG — SIGNIFICANT CHANGE UP (ref 32–46)
PH BLDA: 7.4 — SIGNIFICANT CHANGE UP (ref 7.35–7.45)
PHOSPHATE SERPL-MCNC: 7 MG/DL — HIGH (ref 2.5–4.5)
PLATELET # BLD AUTO: 304 K/UL — SIGNIFICANT CHANGE UP (ref 150–400)
PO2 BLDA: 92 MMHG — SIGNIFICANT CHANGE UP (ref 74–108)
POTASSIUM SERPL-MCNC: 3.9 MMOL/L — SIGNIFICANT CHANGE UP (ref 3.5–5.3)
POTASSIUM SERPL-SCNC: 3.9 MMOL/L — SIGNIFICANT CHANGE UP (ref 3.5–5.3)
PROT SERPL-MCNC: 7.2 G/DL — SIGNIFICANT CHANGE UP (ref 6–8.3)
PROTHROM AB SERPL-ACNC: 13.2 SEC — HIGH (ref 9.8–12.7)
RBC # BLD: 3.77 M/UL — LOW (ref 4.2–5.8)
RBC # FLD: 12.8 % — SIGNIFICANT CHANGE UP (ref 10.3–14.5)
SAO2 % BLDA: 96 % — SIGNIFICANT CHANGE UP (ref 92–96)
SODIUM SERPL-SCNC: 139 MMOL/L — SIGNIFICANT CHANGE UP (ref 135–145)
WBC # BLD: 10.2 K/UL — SIGNIFICANT CHANGE UP (ref 3.8–10.5)
WBC # FLD AUTO: 10.2 K/UL — SIGNIFICANT CHANGE UP (ref 3.8–10.5)

## 2017-09-11 PROCEDURE — 71010: CPT | Mod: 26

## 2017-09-11 RX ORDER — NITROGLYCERIN 6.5 MG
0.6 CAPSULE, EXTENDED RELEASE ORAL ONCE
Qty: 0 | Refills: 0 | Status: COMPLETED | OUTPATIENT
Start: 2017-09-11 | End: 2017-09-11

## 2017-09-11 RX ORDER — METOPROLOL TARTRATE 50 MG
5 TABLET ORAL ONCE
Qty: 0 | Refills: 0 | Status: COMPLETED | OUTPATIENT
Start: 2017-09-11 | End: 2017-09-11

## 2017-09-11 RX ORDER — NIFEDIPINE 30 MG
60 TABLET, EXTENDED RELEASE 24 HR ORAL DAILY
Qty: 0 | Refills: 0 | Status: DISCONTINUED | OUTPATIENT
Start: 2017-09-11 | End: 2017-09-12

## 2017-09-11 RX ORDER — PANTOPRAZOLE SODIUM 20 MG/1
40 TABLET, DELAYED RELEASE ORAL DAILY
Qty: 0 | Refills: 0 | Status: DISCONTINUED | OUTPATIENT
Start: 2017-09-11 | End: 2017-09-12

## 2017-09-11 RX ORDER — ISOSORBIDE MONONITRATE 60 MG/1
30 TABLET, EXTENDED RELEASE ORAL DAILY
Qty: 0 | Refills: 0 | Status: DISCONTINUED | OUTPATIENT
Start: 2017-09-11 | End: 2017-09-12

## 2017-09-11 RX ORDER — SEVELAMER CARBONATE 2400 MG/1
1600 POWDER, FOR SUSPENSION ORAL
Qty: 0 | Refills: 0 | Status: DISCONTINUED | OUTPATIENT
Start: 2017-09-11 | End: 2017-09-12

## 2017-09-11 RX ORDER — METOPROLOL TARTRATE 50 MG
50 TABLET ORAL EVERY 8 HOURS
Qty: 0 | Refills: 0 | Status: DISCONTINUED | OUTPATIENT
Start: 2017-09-11 | End: 2017-09-12

## 2017-09-11 RX ADMIN — PANTOPRAZOLE SODIUM 40 MILLIGRAM(S): 20 TABLET, DELAYED RELEASE ORAL at 12:46

## 2017-09-11 RX ADMIN — SEVELAMER CARBONATE 1600 MILLIGRAM(S): 2400 POWDER, FOR SUSPENSION ORAL at 17:54

## 2017-09-11 RX ADMIN — BUDESONIDE AND FORMOTEROL FUMARATE DIHYDRATE 2 PUFF(S): 160; 4.5 AEROSOL RESPIRATORY (INHALATION) at 23:37

## 2017-09-11 RX ADMIN — PROPOFOL 0.05 MICROGRAM(S)/KG/MIN: 10 INJECTION, EMULSION INTRAVENOUS at 07:36

## 2017-09-11 RX ADMIN — PROPOFOL 0.05 MICROGRAM(S)/KG/MIN: 10 INJECTION, EMULSION INTRAVENOUS at 05:13

## 2017-09-11 RX ADMIN — HEPARIN SODIUM 5000 UNIT(S): 5000 INJECTION INTRAVENOUS; SUBCUTANEOUS at 17:51

## 2017-09-11 RX ADMIN — Medication 50 MILLIGRAM(S): at 21:13

## 2017-09-11 RX ADMIN — Medication 50 MILLIGRAM(S): at 17:50

## 2017-09-11 RX ADMIN — Medication 5 MILLIGRAM(S): at 15:26

## 2017-09-11 RX ADMIN — Medication 60 MILLIGRAM(S): at 14:55

## 2017-09-11 RX ADMIN — CEFTRIAXONE 100 GRAM(S): 500 INJECTION, POWDER, FOR SOLUTION INTRAMUSCULAR; INTRAVENOUS at 18:50

## 2017-09-11 RX ADMIN — ISOSORBIDE MONONITRATE 30 MILLIGRAM(S): 60 TABLET, EXTENDED RELEASE ORAL at 14:55

## 2017-09-11 RX ADMIN — Medication 0.6 MILLIGRAM(S): at 15:21

## 2017-09-11 RX ADMIN — Medication 81 MILLIGRAM(S): at 12:46

## 2017-09-11 RX ADMIN — TAMSULOSIN HYDROCHLORIDE 0.4 MILLIGRAM(S): 0.4 CAPSULE ORAL at 21:13

## 2017-09-11 RX ADMIN — CLOPIDOGREL BISULFATE 75 MILLIGRAM(S): 75 TABLET, FILM COATED ORAL at 12:46

## 2017-09-11 RX ADMIN — ATORVASTATIN CALCIUM 80 MILLIGRAM(S): 80 TABLET, FILM COATED ORAL at 21:13

## 2017-09-11 RX ADMIN — HEPARIN SODIUM 5000 UNIT(S): 5000 INJECTION INTRAVENOUS; SUBCUTANEOUS at 05:13

## 2017-09-11 NOTE — PROGRESS NOTE ADULT - PROBLEM SELECTOR PLAN 2
r/o stage 3 , secondary to dm/htn  -Keep patient euvolemic and renal diet  -Avoid Nephrotoxic Meds/ Agents such as (NSAIDs, IV contrast, Aminoglycosides such as gentamicin, -Gadolinium contrast, Phosphate containing enemas, etc..)  -Adjust Medications according to eGFR.

## 2017-09-11 NOTE — PROGRESS NOTE ADULT - PROBLEM SELECTOR PLAN 1
r/o secondary to rt.sided obstructive uropathy plus ATN  secondary to NSTEMI ?   -s/p HD X 1 yesterday for fluid overload/chf  -no need for hd today. we will plan for hd tomorrow  -we will avoid Lasix as pt ha spoor U/O on lasix drip  -Keep patient euvolemic and renal diet  -Avoid Nephrotoxic Meds/ Agents such as (NSAIDs, IV contrast, Aminoglycosides such as gentamicin, -Gadolinium contrast, Phosphate containing enemas, etc..)  -Adjust Medications according to eGFR  -pt needs cardiac cath.pt is high risk for CN post  cardiac cath and may develop irreversible renal damage and may need hd for long term/permanently r/o secondary to rt.sided obstructive uropathy plus ATN  secondary to NSTEMI ?   -s/p HD X 1 yesterday for fluid overload/chf  -no need for hd today. we will plan for hd tomorrow  -we will avoid Lasix as pt ha spoor U/O on lasix drip  -Keep patient euvolemic and renal diet  -Avoid Nephrotoxic Meds/ Agents such as (NSAIDs, IV contrast, Aminoglycosides such as gentamicin, -Gadolinium contrast, Phosphate containing enemas, etc..)  -Adjust Medications according to eGFR  -pt needs cardiac cath.pt is high risk for CN post  cardiac cath and may develop irreversible renal damage and may need hd for long term/permanently   d/w pts daughter at length pts above risk and benefits from cardiac cath

## 2017-09-11 NOTE — PROGRESS NOTE ADULT - SUBJECTIVE AND OBJECTIVE BOX
Patient seen and examined.   MEDICATIONS  (STANDING):  aspirin  chewable 81 milliGRAM(s) Oral daily  cefTRIAXone   IVPB 1 Gram(s) IV Intermittent every 24 hours  buDESOnide  80 MICROgram(s)/formoterol 4.5 MICROgram(s) Inhaler 2 Puff(s) Inhalation two times a day  tamsulosin 0.4 milliGRAM(s) Oral at bedtime  atorvastatin 80 milliGRAM(s) Oral at bedtime  insulin lispro (HumaLOG) corrective regimen sliding scale   SubCutaneous every 6 hours  clopidogrel Tablet 75 milliGRAM(s) Oral daily  heparin  Injectable 5000 Unit(s) SubCutaneous every 12 hours  furosemide Infusion 15 mG/Hr (7.5 mL/Hr) IV Continuous <Continuous>  pantoprazole  Injectable 40 milliGRAM(s) IV Push daily  NIFEdipine XL 60 milliGRAM(s) Oral daily  isosorbide   mononitrate ER Tablet (IMDUR) 30 milliGRAM(s) Oral daily  nitroglycerin     SubLingual 0.6 milliGRAM(s) SubLingual once  metoprolol Injectable 5 milliGRAM(s) IV Push once      MEDICATIONS  (PRN):   Medications up to date at time of exam.      PHYSICAL EXAMINATION:  Patient has no new complaints.  GENERAL: The patient is a well-developed, well-nourished, in no apparent distress.     Vital Signs Last 24 Hrs  T(C): 37.5 (11 Sep 2017 12:00), Max: 37.5 (11 Sep 2017 12:00)  T(F): 99.5 (11 Sep 2017 12:00), Max: 99.5 (11 Sep 2017 12:00)  HR: 105 (11 Sep 2017 13:00) (68 - 121)  BP: 178/82 (11 Sep 2017 13:00) (112/62 - 178/82)  BP(mean): 106 (11 Sep 2017 13:00) (74 - 106)  RR: 17 (11 Sep 2017 13:00) (13 - 25)  SpO2: 94% (11 Sep 2017 13:00) (88% - 100%)  Mode: CPAP with PS  FiO2: 40  PEEP: 5  PS: 5  ITime: 1  MAP: 6  PIP: 11   (if applicable)      HEENT: Head is normocephalic and atraumatic.     NECK: Supple, no palpable adenopathy.    LUNGS: Clear to auscultation, no wheezing, rales, or rhonchi.    HEART: Regular rate and rhythm + II/VI HSM @ LSB.    ABDOMEN: Soft, nontender, and nondistended.  No hepatosplenomegaly is noted.    EXTREMITIES: Without any cyanosis, clubbing, rash, lesions or edema.    NEUROLOGIC: Awake, alert.    SKIN: Warm, dry, good turgor.      LABS:                        10.4   10.2  )-----------( 304      ( 11 Sep 2017 06:21 )             31.7     09-11    139  |  102  |  76<H>  ----------------------------<  89  3.9   |  23  |  6.86<H>    Ca    8.8      11 Sep 2017 06:21  Phos  7.0     09-11  Mg     2.6     09-11    TPro  7.2  /  Alb  2.4<L>  /  TBili  0.6  /  DBili  x   /  AST  35  /  ALT  11  /  AlkPhos  78  09-11    PT/INR - ( 11 Sep 2017 06:21 )   PT: 13.2 sec;   INR: 1.21 ratio         PTT - ( 11 Sep 2017 06:21 )  PTT:31.7 sec    ABG - ( 11 Sep 2017 04:52 )  pH: 7.40  /  pCO2: 34    /  pO2: 92    / HCO3: 21    / Base Excess: -3.0  /  SaO2: 96                              MICROBIOLOGY: (if applicable)    RADIOLOGY & ADDITIONAL STUDIES:  EKG:   CXR:  ECHO:    IMPRESSION: 72y Male PAST MEDICAL & SURGICAL HISTORY:  HLD (hyperlipidemia)  Anemia  Acute renal failure  CKD (chronic kidney disease)  COPD (chronic obstructive pulmonary disease)  Fainting  Cardiac arrhythmia  Dizziness  Hydronephrosis  Cholecystitis  Bipolar 1 disorder  DM (diabetes mellitus)  HTN (hypertension)  Lumbar radiculopathy  Left heart failure  Reflux esophagitis  Coronary atherosclerosis  No significant past surgical history       p/w CP, SOB due to NSTEMI. Pt w/ hx of 15 stents, refused stents 2 months ago.    + s/p intubation, now extubated  + worsening creatinine s/p emergent HD    RECOMMENDATIONS:    Echo shows EF 30 - 35%, mild aortic stenosis, moderate diastolic dysfunction  Discussed w/ patient's PMD from [facility] Dr. Moss, patient's baseline creatinine 2 months ago 2.5, has been as high as 4. Patient intubated over the weekend. If creatinine not due to obstruction, then patient can have cardiac cath, however pt still does not want any interventions at present time, therefore medical mgmt for now. Patient seen and examined.   MEDICATIONS  (STANDING):  aspirin  chewable 81 milliGRAM(s) Oral daily  cefTRIAXone   IVPB 1 Gram(s) IV Intermittent every 24 hours  buDESOnide  80 MICROgram(s)/formoterol 4.5 MICROgram(s) Inhaler 2 Puff(s) Inhalation two times a day  tamsulosin 0.4 milliGRAM(s) Oral at bedtime  atorvastatin 80 milliGRAM(s) Oral at bedtime  insulin lispro (HumaLOG) corrective regimen sliding scale   SubCutaneous every 6 hours  clopidogrel Tablet 75 milliGRAM(s) Oral daily  heparin  Injectable 5000 Unit(s) SubCutaneous every 12 hours  furosemide Infusion 15 mG/Hr (7.5 mL/Hr) IV Continuous <Continuous>  pantoprazole  Injectable 40 milliGRAM(s) IV Push daily  NIFEdipine XL 60 milliGRAM(s) Oral daily  isosorbide   mononitrate ER Tablet (IMDUR) 30 milliGRAM(s) Oral daily  nitroglycerin     SubLingual 0.6 milliGRAM(s) SubLingual once  metoprolol Injectable 5 milliGRAM(s) IV Push once      MEDICATIONS  (PRN):   Medications up to date at time of exam.      PHYSICAL EXAMINATION:  Patient has no new complaints.  GENERAL: The patient is a well-developed, well-nourished, in no apparent distress.     Vital Signs Last 24 Hrs  T(C): 37.5 (11 Sep 2017 12:00), Max: 37.5 (11 Sep 2017 12:00)  T(F): 99.5 (11 Sep 2017 12:00), Max: 99.5 (11 Sep 2017 12:00)  HR: 105 (11 Sep 2017 13:00) (68 - 121)  BP: 178/82 (11 Sep 2017 13:00) (112/62 - 178/82)  BP(mean): 106 (11 Sep 2017 13:00) (74 - 106)  RR: 17 (11 Sep 2017 13:00) (13 - 25)  SpO2: 94% (11 Sep 2017 13:00) (88% - 100%)  Mode: CPAP with PS  FiO2: 40  PEEP: 5  PS: 5  ITime: 1  MAP: 6  PIP: 11   (if applicable)      HEENT: Head is normocephalic and atraumatic.     NECK: Supple, no palpable adenopathy.    LUNGS: Clear to auscultation, no wheezing, rales, or rhonchi.    HEART: Regular rate and rhythm + II/VI HSM @ LSB.    ABDOMEN: Soft, nontender, and nondistended.  No hepatosplenomegaly is noted.    EXTREMITIES: Without any cyanosis, clubbing, rash, lesions or edema.    NEUROLOGIC: Awake, alert.    SKIN: Warm, dry, good turgor.      LABS:                        10.4   10.2  )-----------( 304      ( 11 Sep 2017 06:21 )             31.7     09-11    139  |  102  |  76<H>  ----------------------------<  89  3.9   |  23  |  6.86<H>    Ca    8.8      11 Sep 2017 06:21  Phos  7.0     09-11  Mg     2.6     09-11    TPro  7.2  /  Alb  2.4<L>  /  TBili  0.6  /  DBili  x   /  AST  35  /  ALT  11  /  AlkPhos  78  09-11    PT/INR - ( 11 Sep 2017 06:21 )   PT: 13.2 sec;   INR: 1.21 ratio         PTT - ( 11 Sep 2017 06:21 )  PTT:31.7 sec    ABG - ( 11 Sep 2017 04:52 )  pH: 7.40  /  pCO2: 34    /  pO2: 92    / HCO3: 21    / Base Excess: -3.0  /  SaO2: 96                              MICROBIOLOGY: (if applicable)    RADIOLOGY & ADDITIONAL STUDIES:  EKG:   CXR:  ECHO:    IMPRESSION: 72y Male PAST MEDICAL & SURGICAL HISTORY:  HLD (hyperlipidemia)  Anemia  Acute renal failure  CKD (chronic kidney disease)  COPD (chronic obstructive pulmonary disease)  Fainting  Cardiac arrhythmia  Dizziness  Hydronephrosis  Cholecystitis  Bipolar 1 disorder  DM (diabetes mellitus)  HTN (hypertension)  Lumbar radiculopathy  Left heart failure  Reflux esophagitis  Coronary atherosclerosis  No significant past surgical history       p/w CP, SOB due to NSTEMI. Pt w/ hx of 15 stents, refused stents 2 months ago.    + s/p intubation, now extubated  + worsening creatinine s/p emergent HD    RECOMMENDATIONS:    Echo shows EF 30 - 35%, mild aortic stenosis, moderate diastolic dysfunction  Discussed w/ patient's PMD from [facility] Dr. Moss, patient's baseline creatinine 2 months ago 2.5, has been as high as 4. Patient intubated over the weekend.     Patient scheduled for HD per primary team tomorrow. Now consenting for cardiac cath. Plan is to have patient transferred after HD tomorrow to [place] for cath w/ Dr. Maria Ines Graf.

## 2017-09-11 NOTE — PROGRESS NOTE ADULT - ASSESSMENT
R hydro, R proximal ureteral calculi s/p james catheter placement, emergent HD. Intubated yesterday.  Cr improving. urine output improved

## 2017-09-11 NOTE — PROGRESS NOTE ADULT - ASSESSMENT
A 73yo male, Bur[place]ian speaking, wife is current patient in ICU, daughter at bedside to [facility], [facility] CAD s/p 12-14 stents (placed [phone]), DM, HTN, HLD, CVA (4 yrs ago, residual left sided facial weakness), lumbar radiculopathy with herniated disc, COPD (never smoker, passive smoke exposure), CKD (last creatinine 2 months ago was 1.2), bipolar disorder (no meds, sees psychiatrist), chronic kidney stones (has had ureteral stent in past) p/w 2 days intractable right flank pain radiating to groin, chills, vomiting.  BIBA.       # complicated UTI with b/l U stones and mild right hydronephrosis. u/a grossly positive, nitrite +, likley G -ve infection.     Would recommendation:    1. Obtain repeat UA and culture  2. Continue Ceftriaxone for now  2. Urology recommendation  noted  3. Monitor WBC count, is mildly elevated  4. IVF     d/w Family at the bed side and ICU  team    note not complete yet!. A 73yo male, Macanese speaking, wife is current patient in ICU, daughter at bedside to [facility], [facility] CAD s/p 12-14 stents (placed [phone]), DM, HTN, HLD, CVA (4 yrs ago, residual left sided facial weakness), lumbar radiculopathy with herniated disc, COPD (never smoker, passive smoke exposure), CKD (last creatinine 2 months ago was 1.2), bipolar disorder (no meds, sees psychiatrist), chronic kidney stones (has had ureteral stent in past) p/w 2 days intractable right flank pain radiating to groin, chills, vomiting.  BIBA.       # complicated UTI with b/l U stones and mild right hydronephrosis. u/a grossly positive, nitrite +, likley G -ve infection.   also noted that patient was intubated, now extubated, sec to pulmonary edema, fluid overload and received HD.     Would recommendation:    1. Obtain repeat UA and culture  2. Continue Ceftriaxone for now  2. Urology recommendation  noted  3. Monitor WBC count    d/w Family at the bed side and ICU  team    note not complete yet!. A 71yo male, [place] Island[place] speaking, wife is current patient in ICU, daughter at bedside to [facility] [place], [facility] CAD s/p 12-14 stents (placed [phone]), DM, HTN, HLD, CVA (4 yrs ago, residual left sided facial weakness), lumbar radiculopathy with herniated disc, COPD (never smoker, passive smoke exposure), CKD (last creatinine 2 months ago was 1.2), bipolar disorder (no meds, sees psychiatrist), chronic kidney stones (has had ureteral stent in past) p/w 2 days intractable right flank pain radiating to groin, chills, vomiting.  BIBA.       # complicated UTI with b/l U stones and mild right hydronephrosis. u/a grossly positive, nitrite +, likley G -ve infection.   also noted that patient was intubated, now extubated, sec to pulmonary edema, fluid overload and received HD.     Would recommendation:    1. Obtain repeat UA and culture  2. Continue Ceftriaxone for now  2. Urology recommendation  noted  3. Monitor WBC count    d/w  ICU  team

## 2017-09-11 NOTE — PROGRESS NOTE ADULT - ASSESSMENT
73yo male, Micronesian speaking, wife is current patient in ICU, daughter at bedside to [facility], [facility] CAD s/p 12-14 stents (placed [phone]), DM, HTN, HLD, CVA (4 yrs ago, residual left sided facial weakness), lumbar radiculopathy with herniated disc, COPD (never smoker, passive smoke exposure), CKD (last creatinine 2 months ago was 1.2), bipolar disorder (no meds, sees psychiatrist), chronic kidney stones (has had ureteral stent in past) p/w 2 days intractable right flank pain radiating to groin, chills, vomiting 2/2 kidney stones/UTI, rule out pyelonephritis/obstruction.  Patient with NSTEMI with unstable angina and TWI lateral leads

## 2017-09-11 NOTE — PROGRESS NOTE ADULT - SUBJECTIVE AND OBJECTIVE BOX
INTERVAL HPI/ OVERNIGHT EVENTS: Patient was intubated and put on Lasix drip due to fluid overload. Patient had emergency HD yesterday.         Antimicrobial:  cefTRIAXone   IVPB 1 Gram(s) IV Intermittent every 24 hours    Cardiovascular:  tamsulosin 0.4 milliGRAM(s) Oral at bedtime  phenylephrine    Infusion 0.1 MICROgram(s)/kG/Min IV Continuous <Continuous>  furosemide Infusion 15 mG/Hr IV Continuous <Continuous>    Pulmonary:  buDESOnide  80 MICROgram(s)/formoterol 4.5 MICROgram(s) Inhaler 2 Puff(s) Inhalation two times a day    Hematalogic:  aspirin  chewable 81 milliGRAM(s) Oral daily  clopidogrel Tablet 75 milliGRAM(s) Oral daily  heparin  Injectable 5000 Unit(s) SubCutaneous every 12 hours    Other:  atorvastatin 80 milliGRAM(s) Oral at bedtime  HYDROmorphone  Injectable 1 milliGRAM(s) IV Push every 4 hours PRN  insulin lispro (HumaLOG) corrective regimen sliding scale   SubCutaneous every 6 hours  propofol Infusion 0.1 MICROgram(s)/kG/Min IV Continuous <Continuous>  pantoprazole  Injectable 40 milliGRAM(s) IV Push daily    aspirin  chewable 81 milliGRAM(s) Oral daily  cefTRIAXone   IVPB 1 Gram(s) IV Intermittent every 24 hours  buDESOnide  80 MICROgram(s)/formoterol 4.5 MICROgram(s) Inhaler 2 Puff(s) Inhalation two times a day  tamsulosin 0.4 milliGRAM(s) Oral at bedtime  atorvastatin 80 milliGRAM(s) Oral at bedtime  HYDROmorphone  Injectable 1 milliGRAM(s) IV Push every 4 hours PRN  insulin lispro (HumaLOG) corrective regimen sliding scale   SubCutaneous every 6 hours  phenylephrine    Infusion 0.1 MICROgram(s)/kG/Min IV Continuous <Continuous>  propofol Infusion 0.1 MICROgram(s)/kG/Min IV Continuous <Continuous>  clopidogrel Tablet 75 milliGRAM(s) Oral daily  heparin  Injectable 5000 Unit(s) SubCutaneous every 12 hours  furosemide Infusion 15 mG/Hr IV Continuous <Continuous>  pantoprazole  Injectable 40 milliGRAM(s) IV Push daily    Drug Dosing Weight  Height (cm): 172.72 (06 Sep 2017 21:30)  Weight (kg): 88.7 (06 Sep 2017 21:30)  BMI (kg/m2): 29.7 (06 Sep 2017 21:30)  BSA (m2): 2.02 (06 Sep 2017 21:30)    CENTRAL LINE: [ ] YES [ ] NO  LOCATION:   DATE INSERTED:  REMOVE: [ ] YES [ ] NO  EXPLAIN:    CERVANTES: [ ] YES [ ] NO    DATE INSERTED:  REMOVE:  [ ] YES [ ] NO  EXPLAIN:    A-LINE:  [ ] YES [ ] NO  LOCATION:   DATE INSERTED:  REMOVE:  [ ] YES [ ] NO  EXPLAIN:    PMH -reviewed admission note, no change since admission  Heart faliure: acute [x ] chronic [x ] acute or chronic [ ] diastolic [x ] systolic [ ] combied systolic and diastolic[ ]  LIN: +    ICU Vital Signs Last 24 Hrs  T(C): 36.8 (11 Sep 2017 04:00), Max: 36.8 (11 Sep 2017 04:00)  T(F): 98.3 (11 Sep 2017 04:00), Max: 98.3 (11 Sep 2017 04:00)  HR: 87 (11 Sep 2017 07:30) (68 - 128)  BP: 146/70 (11 Sep 2017 07:30) (109/59 - 161/139)  BP(mean): 89 (11 Sep 2017 07:30) (72 - 145)  ABP: --  ABP(mean): --  RR: 17 (11 Sep 2017 07:30) (14 - 26)  SpO2: 99% (11 Sep 2017 07:30) (83% - 100%)      ABG - ( 11 Sep 2017 04:52 )  pH: 7.40  /  pCO2: 34    /  pO2: 92    / HCO3: 21    / Base Excess: -3.0  /  SaO2: 96                    09-10 @ 07:01  -  09-11 @ 07:00  --------------------------------------------------------  IN: 1128.5 mL / OUT: 2264 mL / NET: -1135.5 mL        Mode: AC/ CMV (Assist Control/ Continuous Mandatory Ventilation)  RR (machine): 16  TV (machine): 500  FiO2: 40  PEEP: 5  ITime: 1  MAP: 9.8  PIP: 23      PHYSICAL EXAM:    GENERAL: [x ]NAD, [x ]well-groomed, [x ]well-developed  HEAD:  [ x]Atraumatic, [ x]Normocephalic  EYES: [x ]EOMI, [x ]PERRLA, [ ]conjunctiva and sclera clear  ENMT: [x ]No tonsillar erythema, exudates, or enlargement; [ ]Moist mucous membranes, [ ]Good dentition, [ ]No lesions  NECK: [ x]Supple, normal appearance, [ ]No JVD; [ ]Normal thyroid; [ ]Trachea midline  NERVOUS SYSTEM:  [x ]Alert & Oriented X3, [x ]Good concentration; [ ]Motor Strength 5/5 B/L upper and lower extremities; [ ]DTRs 2+ intact and symmetric  CHEST/LUNG: [x ]No chest deformity; [ x) rales, rhonchi, and intubated  HEART: [x ]Regular rate and rhythm; [ ]No murmurs, rubs, or gallops  ABDOMEN: [x ]Soft, Nontender, Nondistended; [ ]Bowel sounds present  EXTREMITIES:  [ x]2+ Peripheral Pulses, [ ]No clubbing, cyanosis, or edema  LYMPH: [x ]No lymphadenopathy noted  SKIN: [ x]No rashes or lesions; [ ]Good capillary refill        LABS:  CBC Full  -  ( 11 Sep 2017 06:21 )  WBC Count : 10.2 K/uL  Hemoglobin : 10.4 g/dL  Hematocrit : 31.7 %  Platelet Count - Automated : 304 K/uL  Mean Cell Volume : 84.1 fl  Mean Cell Hemoglobin : 27.6 pg  Mean Cell Hemoglobin Concentration : 32.8 gm/dL  Auto Neutrophil # : 8.0 K/uL  Auto Lymphocyte # : 1.3 K/uL  Auto Monocyte # : 0.7 K/uL  Auto Eosinophil # : 0.1 K/uL  Auto Basophil # : 0.0 K/uL  Auto Neutrophil % : 78.5 %  Auto Lymphocyte % : 13.1 %  Auto Monocyte % : 7.3 %  Auto Eosinophil % : 0.6 %  Auto Basophil % : 0.4 %    09-11    139  |  102  |  76<H>  ----------------------------<  89  3.9   |  23  |  6.86<H>    Ca    8.8      11 Sep 2017 06:21  Phos  7.0     09-11  Mg     2.6     09-11    TPro  7.2  /  Alb  2.4<L>  /  TBili  0.6  /  DBili  x   /  AST  35  /  ALT  11  /  AlkPhos  78  09-11    PT/INR - ( 11 Sep 2017 06:21 )   PT: 13.2 sec;   INR: 1.21 ratio         PTT - ( 11 Sep 2017 06:21 )  PTT:31.7 sec        [x ]GOALS OF CARE DISCUSSION WITH PATIENT/FAMILY/PROXY: Daughter    CRITICAL CARE TIME SPENT: 35 minutes

## 2017-09-11 NOTE — CHART NOTE - NSCHARTNOTEFT_GEN_A_CORE
Cardiologist Dr Fernando explained patient the need for cardiac cath in Am and patient defer the decision to daughter.   Daughter Anisa was informed by myself, Dr Dumas about the risk of permanent dialysis after cath even with dialysis after.   Daughter understand and agree to go through with  cath and transfer.

## 2017-09-11 NOTE — PROGRESS NOTE ADULT - PROBLEM SELECTOR PLAN 5
fever, chills, vomiting, perinephric stranding on CT scan, + R CVAT  UA positive nitrite/LE/WBC  - ceftriaxone day 6  - follow up UCx and Bcx- negative up to date

## 2017-09-11 NOTE — PROGRESS NOTE ADULT - SUBJECTIVE AND OBJECTIVE BOX
Pt seen at bedside  Patient is a 72y old  Male who presents with a chief complaint of right flank pain, chest pain (06 Sep 2017 20:37)      INTERVAL HPI/OVERNIGHT EVENTS:  Intubated yesterday. Received emergent HD yesterday    Vital Signs Last 24 Hrs  T(C): 37.2 (11 Sep 2017 08:00), Max: 37.2 (11 Sep 2017 08:00)  T(F): 99 (11 Sep 2017 08:00), Max: 99 (11 Sep 2017 08:00)  HR: 99 (11 Sep 2017 09:30) (68 - 128)  BP: 158/76 (11 Sep 2017 09:30) (109/59 - 161/139)  BP(mean): 96 (11 Sep 2017 09:30) (72 - 145)  RR: 13 (11 Sep 2017 09:30) (13 - 26)  SpO2: 99% (11 Sep 2017 09:30) (83% - 100%)    Physical Exam:    Gen: intubated but awake, alert   HEENT: NGT in place with bilious output  Abd: soft NT ND no suprapubic tenderness  Pelvic:  james catheter in place with clear urine  Ext: warm to touch no c/c/e    MEDICATIONS  (STANDING):  aspirin  chewable 81 milliGRAM(s) Oral daily  cefTRIAXone   IVPB 1 Gram(s) IV Intermittent every 24 hours  buDESOnide  80 MICROgram(s)/formoterol 4.5 MICROgram(s) Inhaler 2 Puff(s) Inhalation two times a day  tamsulosin 0.4 milliGRAM(s) Oral at bedtime  atorvastatin 80 milliGRAM(s) Oral at bedtime  insulin lispro (HumaLOG) corrective regimen sliding scale   SubCutaneous every 6 hours  clopidogrel Tablet 75 milliGRAM(s) Oral daily  heparin  Injectable 5000 Unit(s) SubCutaneous every 12 hours  furosemide Infusion 15 mG/Hr (7.5 mL/Hr) IV Continuous <Continuous>  pantoprazole  Injectable 40 milliGRAM(s) IV Push daily    MEDICATIONS  (PRN):                            10.4   10.2  )-----------( 304      ( 11 Sep 2017 06:21 )             31.7     09-11    139  |  102  |  76<H>  ----------------------------<  89  3.9   |  23  |  6.86<H>    Ca    8.8      11 Sep 2017 06:21  Phos  7.0     09-11  Mg     2.6     09-11    TPro  7.2  /  Alb  2.4<L>  /  TBili  0.6  /  DBili  x   /  AST  35  /  ALT  11  /  AlkPhos  78  09-11    PT/INR - ( 11 Sep 2017 06:21 )   PT: 13.2 sec;   INR: 1.21 ratio         PTT - ( 11 Sep 2017 06:21 )  PTT:31.7 sec    I&O's Detail    10 Sep 2017 07:01  -  11 Sep 2017 07:00  --------------------------------------------------------  IN:    furosemide Infusion: 22.5 mL    furosemide Infusion: 135 mL    nitroglycerin  Infusion: 6 mL    nitroglycerin  Infusion: 36 mL    phenylephrine   Infusion: 320.4 mL    propofol Infusion: 558.6 mL    Solution: 50 mL  Total IN: 1128.5 mL    OUT:    Indwelling Catheter - Urethral: 1924 mL    Nasoenteral Tube: 350 mL  Total OUT: 2274 mL    Total NET: -1145.5 mL      11 Sep 2017 07:01  -  11 Sep 2017 10:07  --------------------------------------------------------  IN:    furosemide Infusion: 15 mL    propofol Infusion: 26.6 mL  Total IN: 41.6 mL    OUT:    Indwelling Catheter - Urethral: 10 mL  Total OUT: 10 mL    Total NET: 31.6 mL

## 2017-09-11 NOTE — PROGRESS NOTE ADULT - PROBLEM SELECTOR PLAN 4
LIN on CKD, last creatinine increasing to 8.23 with BUN 90.   Patient refused james, dialysis and IR guided nephrostomy.   CT abdomen/pelvis: right hydronephrosis, right ureteral stone; bilateral renal calculi, left renal atrophy.  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    - urology consult tonight - Dr Irvin consulted

## 2017-09-11 NOTE — PROGRESS NOTE ADULT - PROBLEM SELECTOR PLAN 3
SBP in 140/150, responded to labetalol and lopressor IV  - NGT drip was restated due to patient recurrent chest pain.

## 2017-09-11 NOTE — PROGRESS NOTE ADULT - PROBLEM SELECTOR PLAN 4
hx of recurrent renal stones /ureteral stones, with rt.mild hydro  -plan as per urology  -pt will need renal stone w/u as outpatient after cardiac work up  -may have uric acid stones  -f/u urine PH.

## 2017-09-11 NOTE — PROGRESS NOTE ADULT - PROBLEM SELECTOR PLAN 1
Hypoxic respiratory distress from Pul edema from CHF from acute NSTEMI  Intubated and sedated on propofol.   Lasix drip was started yesterday with /hr with total negative 1.4 L during hospital stay.

## 2017-09-11 NOTE — PROGRESS NOTE ADULT - ATTENDING COMMENTS
72 male with hx of CAD s/p prior PCI, CKD, HTN, nephrolithiasis, ureteral stent, CVA, COPD, presented to with chest and flank pain, found to have NSTEMI, CHRrEF, mild AS, kidney stones/ hydronephrosis, LIN eventually requiring dialysis, subsequently developing acute resp failure requiring intubation.  Patient initially refusing james, ureteral stent, or cardiac cath.  Will continue diuresis, may need nephrostomy tube.  Will discuss goals of care with family.  Total CC time 35 min.

## 2017-09-11 NOTE — PROGRESS NOTE ADULT - SUBJECTIVE AND OBJECTIVE BOX
Patient seen and examined.   tm 99    MEDICATIONS  (STANDING):  aspirin  chewable 81 milliGRAM(s) Oral daily  cefTRIAXone   IVPB 1 Gram(s) IV Intermittent every 24 hours  buDESOnide  80 MICROgram(s)/formoterol 4.5 MICROgram(s) Inhaler 2 Puff(s) Inhalation two times a day  tamsulosin 0.4 milliGRAM(s) Oral at bedtime  atorvastatin 80 milliGRAM(s) Oral at bedtime  insulin lispro (HumaLOG) corrective regimen sliding scale   SubCutaneous every 6 hours  clopidogrel Tablet 75 milliGRAM(s) Oral daily  heparin  Injectable 5000 Unit(s) SubCutaneous every 12 hours  furosemide Infusion 15 mG/Hr (7.5 mL/Hr) IV Continuous <Continuous>  pantoprazole  Injectable 40 milliGRAM(s) IV Push daily      MEDICATIONS  (PRN):       Medications up to date at time of exam.      PHYSICAL EXAMINATION:      Vital Signs Last 24 Hrs  T(C): 37.2 (11 Sep 2017 08:00), Max: 37.2 (11 Sep 2017 08:00)  T(F): 99 (11 Sep 2017 08:00), Max: 99 (11 Sep 2017 08:00)  HR: 99 (11 Sep 2017 09:30) (68 - 128)  BP: 158/76 (11 Sep 2017 09:30) (109/59 - 161/139)  BP(mean): 96 (11 Sep 2017 09:30) (72 - 145)  RR: 13 (11 Sep 2017 09:30) (13 - 26)  SpO2: 99% (11 Sep 2017 09:30) (83% - 100%)  Mode: CPAP with PS  FiO2: 40  PEEP: 5  PS: 5  ITime: 1  MAP: 6  PIP: 11   (if applicable)      GENERAL: The patient is a well-developed, well-nourished, in no apparent distress.     HEENT: Head is normocephalic and atraumatic. Extraocular muscles are intact. Mucous membranes are moist.     NECK: Supple, no palpable adenopathy.    LUNGS: Clear to auscultation, no wheezing, rales, or rhonchi.    HEART: Regular rate and rhythm without murmur.    ABDOMEN: Soft, nontender, and nondistended.  No hepatosplenomegaly is noted. right suprapubic tendermness    EXTREMITIES: Without any cyanosis, clubbing, rash, lesions or edema.    NEUROLOGIC: Awake, alert, oriented.     SKIN: Warm, dry, good turgor.    LABS:                        10.4   10.2  )-----------( 304      ( 11 Sep 2017 06:21 )             31.7     09-11    139  |  102  |  76<H>  ----------------------------<  89  3.9   |  23  |  6.86<H>    Ca    8.8      11 Sep 2017 06:21  Phos  7.0     09-11  Mg     2.6     09-11    TPro  7.2  /  Alb  2.4<L>  /  TBili  0.6  /  DBili  x   /  AST  35  /  ALT  11  /  AlkPhos  78  09-11    PT/INR - ( 11 Sep 2017 06:21 )   PT: 13.2 sec;   INR: 1.21 ratio         PTT - ( 11 Sep 2017 06:21 )  PTT:31.7 sec    ABG - ( 11 Sep 2017 04:52 )  pH: 7.40  /  pCO2: 34    /  pO2: 92    / HCO3: 21    / Base Excess: -3.0  /  SaO2: 96            Culture - Blood (09.08.17 @ 14:17)    Specimen Source: .Blood Blood    Culture Results:   No growth to date.    Culture - Blood (09.07.17 @ 10:02)    Specimen Source: .Blood Blood-Peripheral    Culture Results:   No growth to date.    Culture - Urine (09.06.17 @ 23:53)    Specimen Source: .Urine Clean Catch (Midstream)    Culture Results:   No growth          < from: Xray Chest 1 View AP -PORTABLE-Routine (09.11.17 @ 09:59) >  INTERPRETATION:  AP semierect chest on September 11 at 8:53 AM.    Heart magnified by technique.    Endotracheal tube and nasogastric tube remain.    On September 10 there were significant bilateral infiltrates.    Present film suggests slight improvement particularly in the right lung.    There is an increasing right effusion on present study.    IMPRESSION: As above.    < end of copied text >      < from: Xray Chest 1 View AP -PORTABLE-Routine (09.10.17 @ 10:25) >    Impression: The heart is unchanged. There is acute diffuse interstitial   opacification consistent with pulmonary edema.    < end of copied text >        culture  MICROBIOLOGY: (if applicable)    RADIOLOGY & ADDITIONAL STUDIES:  EKG:   CXR:  ECHO:      RECOMMENDATIONS: Patient seen and examined.   tm 99  extubated  no acute distress    MEDICATIONS  (STANDING):  aspirin  chewable 81 milliGRAM(s) Oral daily  cefTRIAXone   IVPB 1 Gram(s) IV Intermittent every 24 hours  buDESOnide  80 MICROgram(s)/formoterol 4.5 MICROgram(s) Inhaler 2 Puff(s) Inhalation two times a day  tamsulosin 0.4 milliGRAM(s) Oral at bedtime  atorvastatin 80 milliGRAM(s) Oral at bedtime  insulin lispro (HumaLOG) corrective regimen sliding scale   SubCutaneous every 6 hours  clopidogrel Tablet 75 milliGRAM(s) Oral daily  heparin  Injectable 5000 Unit(s) SubCutaneous every 12 hours  furosemide Infusion 15 mG/Hr (7.5 mL/Hr) IV Continuous <Continuous>  pantoprazole  Injectable 40 milliGRAM(s) IV Push daily      MEDICATIONS  (PRN):       Medications up to date at time of exam.      PHYSICAL EXAMINATION:      Vital Signs Last 24 Hrs  T(C): 37.2 (11 Sep 2017 08:00), Max: 37.2 (11 Sep 2017 08:00)  T(F): 99 (11 Sep 2017 08:00), Max: 99 (11 Sep 2017 08:00)  HR: 99 (11 Sep 2017 09:30) (68 - 128)  BP: 158/76 (11 Sep 2017 09:30) (109/59 - 161/139)  BP(mean): 96 (11 Sep 2017 09:30) (72 - 145)  RR: 13 (11 Sep 2017 09:30) (13 - 26)  SpO2: 99% (11 Sep 2017 09:30) (83% - 100%)  Mode: CPAP with PS  FiO2: 40  PEEP: 5  PS: 5  ITime: 1  MAP: 6  PIP: 11   (if applicable)      GENERAL: The patient is a well-developed, well-nourished, in no apparent distress.     HEENT: Head is normocephalic and atraumatic. Extraocular muscles are intact. Mucous membranes are moist.     NECK: Supple, no palpable adenopathy.    LUNGS: BLAE present all over, some basal crepts,    HEART: Regular rate and rhythm without murmur.    ABDOMEN: Soft, nontender, and nondistended.  No hepatosplenomegaly is noted. right suprapubic tendermness  right groin HD catheter.     EXTREMITIES: Without any cyanosis, clubbing, rash, lesions or edema.    NEUROLOGIC: Awake, alert, oriented.     SKIN: Warm, dry, good turgor.    LABS:                        10.4   10.2  )-----------( 304      ( 11 Sep 2017 06:21 )             31.7     09-11    139  |  102  |  76<H>  ----------------------------<  89  3.9   |  23  |  6.86<H>    Ca    8.8      11 Sep 2017 06:21  Phos  7.0     09-11  Mg     2.6     09-11    TPro  7.2  /  Alb  2.4<L>  /  TBili  0.6  /  DBili  x   /  AST  35  /  ALT  11  /  AlkPhos  78  09-11    PT/INR - ( 11 Sep 2017 06:21 )   PT: 13.2 sec;   INR: 1.21 ratio         PTT - ( 11 Sep 2017 06:21 )  PTT:31.7 sec    ABG - ( 11 Sep 2017 04:52 )  pH: 7.40  /  pCO2: 34    /  pO2: 92    / HCO3: 21    / Base Excess: -3.0  /  SaO2: 96            Culture - Blood (09.08.17 @ 14:17)    Specimen Source: .Blood Blood    Culture Results:   No growth to date.    Culture - Blood (09.07.17 @ 10:02)    Specimen Source: .Blood Blood-Peripheral    Culture Results:   No growth to date.    Culture - Urine (09.06.17 @ 23:53)    Specimen Source: .Urine Clean Catch (Midstream)    Culture Results:   No growth          < from: Xray Chest 1 View AP -PORTABLE-Routine (09.11.17 @ 09:59) >  INTERPRETATION:  AP semierect chest on September 11 at 8:53 AM.    Heart magnified by technique.    Endotracheal tube and nasogastric tube remain.    On September 10 there were significant bilateral infiltrates.    Present film suggests slight improvement particularly in the right lung.    There is an increasing right effusion on present study.    IMPRESSION: As above.    < end of copied text >      < from: Xray Chest 1 View AP -PORTABLE-Routine (09.10.17 @ 10:25) >    Impression: The heart is unchanged. There is acute diffuse interstitial   opacification consistent with pulmonary edema.    < end of copied text >        < from: Transthoracic Echocardiogram (09.07.17 @ 06:51) >  CONCLUSIONS:  1. Mitral annular calcification, and calcified mitral  leaflets with normal diastolic opening. Mild mitral  regurgitation.  2. Aortic valve leaflet morphology not well visualized,  appears calcified with reduced opening. Peak transaortic  valve gradient equals 19 mm Hg, mean transaortic valve  gradient equals 10 mm Hg, consistent with mild aortic  stenosis. Trace aortic regurgitation.  3. Normal left ventricular internal dimensions and wall  thicknesses.  4. Severe segmental left ventricular dysfunction.  The mid  to distal anterior wall, distal inferior wall, anteroseptum  and apex are akinetic. Moderate diastolic dysfunction  (Stage II).  5. Normal right ventricular size and function.  6. RVS Pressure is 46 mm Hg.  Mild pulmonary hypertension.    < end of copied text >  culture  MICROBIOLOGY: (if applicable)    RADIOLOGY & ADDITIONAL STUDIES:  EKG:   CXR:  ECHO:      RECOMMENDATIONS:

## 2017-09-11 NOTE — PROGRESS NOTE ADULT - SUBJECTIVE AND OBJECTIVE BOX
pt seen and examined.    SUBJECTIVE:  pt is doing much better today, s/p HD X 1 , now extubated   pt is comfortble on nasal o2 but still having left sided ches dyscomfort but no sob, gi symptons or uremic symptons.    REVIEW OF SYSTEMS:  CONSTITUTIONAL: No weakness, fevers or chills  EYES/ENT: No visual changes;  No vertigo or throat pain   NECK: No pain or stiffness  RESPIRATORY: No cough, wheezing, hemoptysis; No shortness of breath  CARDIOVASCULAR: + chest pain, no  palpitations  GASTROINTESTINAL: No abdominal or epigastric pain. No nausea, vomiting, or hematemesis; No diarrhea or constipation. No melena or hematochezia.  GENITOURINARY: No dysuria, frequency , hematuria, flank pain or nocturia  NEUROLOGICAL: No numbness or weakness  SKIN: No itching, burning, rashes, or lesions   All other review of systems is negative unless indicated above    Current meds:    aspirin  chewable 81 milliGRAM(s) Oral daily  cefTRIAXone   IVPB 1 Gram(s) IV Intermittent every 24 hours  buDESOnide  80 MICROgram(s)/formoterol 4.5 MICROgram(s) Inhaler 2 Puff(s) Inhalation two times a day  tamsulosin 0.4 milliGRAM(s) Oral at bedtime  atorvastatin 80 milliGRAM(s) Oral at bedtime  insulin lispro (HumaLOG) corrective regimen sliding scale   SubCutaneous every 6 hours  clopidogrel Tablet 75 milliGRAM(s) Oral daily  heparin  Injectable 5000 Unit(s) SubCutaneous every 12 hours  pantoprazole  Injectable 40 milliGRAM(s) IV Push daily  NIFEdipine XL 60 milliGRAM(s) Oral daily  isosorbide   mononitrate ER Tablet (IMDUR) 30 milliGRAM(s) Oral daily  metoprolol 50 milliGRAM(s) Oral every 8 hours  sevelamer hydrochloride 1600 milliGRAM(s) Oral three times a day with meals      Vital Signs    T(F): 99.5 (17 @ 12:00), Max: 99.5 (17 @ 12:00)  HR: 139 (17 @ 15:30) (68 - 159)  BP: 144/84 (17 @ 15:30) (112/62 - 180/82)  ABP: --  RR: 21 (17 @ 15:30) (13 - 28)  SpO2: 92% (17 @ 15:30) (88% - 100%)  Wt(kg): --  CVP(cm H2O): --  CO: --  PCWP: --    I and O's:     @ 07:  -  09-10 @ 07:00  --------------------------------------------------------  IN:    nitroglycerin  Infusion: 6 mL    Oral Fluid: 600 mL  Total IN: 606 mL    OUT:    Voided: 675 mL  Total OUT: 675 mL    Total NET: -69 mL      09-10 @ 07:  -   @ 07:00  --------------------------------------------------------  IN:    furosemide Infusion: 22.5 mL    furosemide Infusion: 135 mL    nitroglycerin  Infusion: 6 mL    nitroglycerin  Infusion: 36 mL    phenylephrine   Infusion: 320.4 mL    propofol Infusion: 558.6 mL    Solution: 50 mL  Total IN: 1128.5 mL    OUT:    Indwelling Catheter - Urethral: 1924 mL    Nasoenteral Tube: 350 mL  Total OUT: 2274 mL    Total NET: -1145.5 mL       @ :  -   @ 16:45  --------------------------------------------------------  IN:    furosemide Infusion: 52.5 mL    propofol Infusion: 26.6 mL  Total IN: 79.1 mL    OUT:    Indwelling Catheter - Urethral: 50 mL    Nasoenteral Tube: 150 mL  Total OUT: 200 mL    Total NET: -120.9 mL        Daily     Daily Weight in k.3 (11 Sep 2017 08:00)    PHYSICAL EXAM:  Constitutional: well developed, mildly obese  and in nad  HEENT: PERRLA,  no icteric sclera and mild pallor of conjunctiva noted  Neck: No JVD, thyromegaly or adenopathy  Respiratory: reduced air entry lower lungs with no rales, wheezing or rhonchi  Cardiovascular: S1 and S2 normally heard  Gastrointestinal: soft, nondistended, nontender and normal bowel sounds heard  Extremities: No peripheral edema or cyanosis  Neurological: A/O x 3, no focal deficits  : No flank or cva tenderness palpated.  Skin: No rashes      LABS:    CBC:                          10.4   10.2  )-----------( 304      ( 11 Sep 2017 06:21 )             31.7           BMP:        139  |  102  |  76<H>  ----------------------------<  89  3.9   |  23  |  6.86<H>  09-10    133<L>  |  100  |  90<H>  ----------------------------<  180<H>  4.4   |  17<L>  |  8.23<H>      135  |  102  |  70<H>  ----------------------------<  191<H>  4.4   |  18<L>  |  6.17<H>    Ca    8.8      11 Sep 2017 06:21  Ca    9.0      10 Sep 2017 05:12  Ca    8.8      09 Sep 2017 04:08  Phos  7.0       Phos  6.3     09-10  Phos  5.1       Mg     2.6       Mg     2.4     09-10  Mg     2.2         TPro  7.2  /  Alb  2.4<L>  /  TBili  0.6  /  DBili  x   /  AST  35  /  ALT  11  /  AlkPhos  78    TPro  7.6  /  Alb  2.8<L>  /  TBili  0.6  /  DBili  x   /  AST  26  /  ALT  11  /  AlkPhos  75  09-10  TPro  6.9  /  Alb  2.5<L>  /  TBili  0.5  /  DBili  x   /  AST  47<H>  /  ALT  13  /  AlkPhos  66            URINE STUDIES:        Creatinine, Random Urine: 126 mg/dL ( @ 18:06)  Sodium, Random Urine: 47 mmol/L ( @ 14:03)  Osmolality, Random Urine: 357 mos/kg ( @ 14:03)                  RADIOLOGY & ADDITIONAL STUDIES:  < from: Xray Chest 1 View AP -PORTABLE-Routine (17 @ 09:59) >  EXAM:  CHEST PORTABLE ROUTINE                            PROCEDURE DATE:  2017          INTERPRETATION:  AP semierect chest on  at 8:53 AM.    Heart magnified by technique.    Endotracheal tube and nasogastric tube remain.    On September 10 there were significant bilateral infiltrates.    Present film suggests slight improvement particularly in the right lung.    There is an increasing right effusion on present study.    IMPRESSION: As above.    < end of copied text >

## 2017-09-12 ENCOUNTER — INPATIENT (INPATIENT)
Facility: HOSPITAL | Age: 72
LOS: 35 days | Discharge: HOME | DRG: 216 | End: 2017-10-18
Attending: INTERNAL MEDICINE | Admitting: INTERNAL MEDICINE
Payer: MEDICARE

## 2017-09-12 ENCOUNTER — TRANSCRIPTION ENCOUNTER (OUTPATIENT)
Age: 72
End: 2017-09-12

## 2017-09-12 VITALS
WEIGHT: 179.9 LBS | SYSTOLIC BLOOD PRESSURE: 147 MMHG | RESPIRATION RATE: 16 BRPM | HEART RATE: 86 BPM | OXYGEN SATURATION: 96 % | DIASTOLIC BLOOD PRESSURE: 78 MMHG | HEIGHT: 68 IN

## 2017-09-12 VITALS — RESPIRATION RATE: 24 BRPM | OXYGEN SATURATION: 95 % | HEART RATE: 84 BPM

## 2017-09-12 DIAGNOSIS — I21.4 NON-ST ELEVATION (NSTEMI) MYOCARDIAL INFARCTION: ICD-10-CM

## 2017-09-12 DIAGNOSIS — Z98.890 OTHER SPECIFIED POSTPROCEDURAL STATES: Chronic | ICD-10-CM

## 2017-09-12 LAB
ALBUMIN SERPL ELPH-MCNC: 2.4 G/DL — LOW (ref 3.5–5)
ALP SERPL-CCNC: 82 U/L — SIGNIFICANT CHANGE UP (ref 40–120)
ALT FLD-CCNC: 13 U/L DA — SIGNIFICANT CHANGE UP (ref 10–60)
ANION GAP SERPL CALC-SCNC: 17 MMOL/L — SIGNIFICANT CHANGE UP (ref 5–17)
APTT BLD: 43.4 SEC — HIGH (ref 27.5–37.4)
AST SERPL-CCNC: 24 U/L — SIGNIFICANT CHANGE UP (ref 10–40)
BASOPHILS # BLD AUTO: 0.1 K/UL — SIGNIFICANT CHANGE UP (ref 0–0.2)
BASOPHILS NFR BLD AUTO: 0.7 % — SIGNIFICANT CHANGE UP (ref 0–2)
BILIRUB SERPL-MCNC: 0.8 MG/DL — SIGNIFICANT CHANGE UP (ref 0.2–1.2)
BUN SERPL-MCNC: 103 MG/DL — HIGH (ref 7–18)
CALCIUM SERPL-MCNC: 8.9 MG/DL — SIGNIFICANT CHANGE UP (ref 8.4–10.5)
CHLORIDE SERPL-SCNC: 100 MMOL/L — SIGNIFICANT CHANGE UP (ref 96–108)
CO2 SERPL-SCNC: 18 MMOL/L — LOW (ref 22–31)
CREAT SERPL-MCNC: 9.63 MG/DL — HIGH (ref 0.5–1.3)
CULTURE RESULTS: SIGNIFICANT CHANGE UP
EOSINOPHIL # BLD AUTO: 0 K/UL — SIGNIFICANT CHANGE UP (ref 0–0.5)
EOSINOPHIL NFR BLD AUTO: 0.4 % — SIGNIFICANT CHANGE UP (ref 0–6)
GLUCOSE SERPL-MCNC: 135 MG/DL — HIGH (ref 70–99)
HCT VFR BLD CALC: 33.6 % — LOW (ref 39–50)
HGB BLD-MCNC: 11.2 G/DL — LOW (ref 13–17)
LYMPHOCYTES # BLD AUTO: 1 K/UL — SIGNIFICANT CHANGE UP (ref 1–3.3)
LYMPHOCYTES # BLD AUTO: 9.5 % — LOW (ref 13–44)
MAGNESIUM SERPL-MCNC: 2.8 MG/DL — HIGH (ref 1.6–2.6)
MCHC RBC-ENTMCNC: 28.4 PG — SIGNIFICANT CHANGE UP (ref 27–34)
MCHC RBC-ENTMCNC: 33.3 GM/DL — SIGNIFICANT CHANGE UP (ref 32–36)
MCV RBC AUTO: 85.3 FL — SIGNIFICANT CHANGE UP (ref 80–100)
MONOCYTES # BLD AUTO: 0.9 K/UL — SIGNIFICANT CHANGE UP (ref 0–0.9)
MONOCYTES NFR BLD AUTO: 9 % — SIGNIFICANT CHANGE UP (ref 2–14)
NEUTROPHILS # BLD AUTO: 8.1 K/UL — HIGH (ref 1.8–7.4)
NEUTROPHILS NFR BLD AUTO: 80.4 % — HIGH (ref 43–77)
PHOSPHATE SERPL-MCNC: 8.8 MG/DL — HIGH (ref 2.5–4.5)
PLATELET # BLD AUTO: 248 K/UL — SIGNIFICANT CHANGE UP (ref 150–400)
POTASSIUM SERPL-MCNC: 4.2 MMOL/L — SIGNIFICANT CHANGE UP (ref 3.5–5.3)
POTASSIUM SERPL-SCNC: 4.2 MMOL/L — SIGNIFICANT CHANGE UP (ref 3.5–5.3)
PROT SERPL-MCNC: 7.4 G/DL — SIGNIFICANT CHANGE UP (ref 6–8.3)
RBC # BLD: 3.94 M/UL — LOW (ref 4.2–5.8)
RBC # FLD: 12.5 % — SIGNIFICANT CHANGE UP (ref 10.3–14.5)
SODIUM SERPL-SCNC: 135 MMOL/L — SIGNIFICANT CHANGE UP (ref 135–145)
SPECIMEN SOURCE: SIGNIFICANT CHANGE UP
WBC # BLD: 10.1 K/UL — SIGNIFICANT CHANGE UP (ref 3.8–10.5)
WBC # FLD AUTO: 10.1 K/UL — SIGNIFICANT CHANGE UP (ref 3.8–10.5)

## 2017-09-12 PROCEDURE — 76770 US EXAM ABDO BACK WALL COMP: CPT

## 2017-09-12 PROCEDURE — 83970 ASSAY OF PARATHORMONE: CPT

## 2017-09-12 PROCEDURE — 83935 ASSAY OF URINE OSMOLALITY: CPT

## 2017-09-12 PROCEDURE — 83036 HEMOGLOBIN GLYCOSYLATED A1C: CPT

## 2017-09-12 PROCEDURE — 85610 PROTHROMBIN TIME: CPT

## 2017-09-12 PROCEDURE — 83735 ASSAY OF MAGNESIUM: CPT

## 2017-09-12 PROCEDURE — 82728 ASSAY OF FERRITIN: CPT

## 2017-09-12 PROCEDURE — 80074 ACUTE HEPATITIS PANEL: CPT

## 2017-09-12 PROCEDURE — 80048 BASIC METABOLIC PNL TOTAL CA: CPT

## 2017-09-12 PROCEDURE — 71045 X-RAY EXAM CHEST 1 VIEW: CPT

## 2017-09-12 PROCEDURE — 80061 LIPID PANEL: CPT

## 2017-09-12 PROCEDURE — 87040 BLOOD CULTURE FOR BACTERIA: CPT

## 2017-09-12 PROCEDURE — 76775 US EXAM ABDO BACK WALL LIM: CPT

## 2017-09-12 PROCEDURE — 83550 IRON BINDING TEST: CPT

## 2017-09-12 PROCEDURE — 80307 DRUG TEST PRSMV CHEM ANLYZR: CPT

## 2017-09-12 PROCEDURE — 84100 ASSAY OF PHOSPHORUS: CPT

## 2017-09-12 PROCEDURE — 81001 URINALYSIS AUTO W/SCOPE: CPT

## 2017-09-12 PROCEDURE — 85045 AUTOMATED RETICULOCYTE COUNT: CPT

## 2017-09-12 PROCEDURE — 87086 URINE CULTURE/COLONY COUNT: CPT

## 2017-09-12 PROCEDURE — 36415 COLL VENOUS BLD VENIPUNCTURE: CPT

## 2017-09-12 PROCEDURE — 93010 ELECTROCARDIOGRAM REPORT: CPT

## 2017-09-12 PROCEDURE — 93005 ELECTROCARDIOGRAM TRACING: CPT

## 2017-09-12 PROCEDURE — 71010: CPT | Mod: 26

## 2017-09-12 PROCEDURE — 82570 ASSAY OF URINE CREATININE: CPT

## 2017-09-12 PROCEDURE — 84105 ASSAY OF URINE PHOSPHORUS: CPT

## 2017-09-12 PROCEDURE — 82553 CREATINE MB FRACTION: CPT

## 2017-09-12 PROCEDURE — 82803 BLOOD GASES ANY COMBINATION: CPT

## 2017-09-12 PROCEDURE — 80053 COMPREHEN METABOLIC PANEL: CPT

## 2017-09-12 PROCEDURE — 93458 L HRT ARTERY/VENTRICLE ANGIO: CPT | Mod: 26,GC

## 2017-09-12 PROCEDURE — 84484 ASSAY OF TROPONIN QUANT: CPT

## 2017-09-12 PROCEDURE — 82310 ASSAY OF CALCIUM: CPT

## 2017-09-12 PROCEDURE — 85730 THROMBOPLASTIN TIME PARTIAL: CPT

## 2017-09-12 PROCEDURE — 82550 ASSAY OF CK (CPK): CPT

## 2017-09-12 PROCEDURE — 74018 RADEX ABDOMEN 1 VIEW: CPT

## 2017-09-12 PROCEDURE — 94640 AIRWAY INHALATION TREATMENT: CPT

## 2017-09-12 PROCEDURE — 85027 COMPLETE CBC AUTOMATED: CPT

## 2017-09-12 PROCEDURE — 84560 ASSAY OF URINE/URIC ACID: CPT

## 2017-09-12 PROCEDURE — 99261: CPT

## 2017-09-12 PROCEDURE — 94003 VENT MGMT INPAT SUBQ DAY: CPT

## 2017-09-12 PROCEDURE — 74176 CT ABD & PELVIS W/O CONTRAST: CPT

## 2017-09-12 PROCEDURE — 99285 EMERGENCY DEPT VISIT HI MDM: CPT | Mod: 25

## 2017-09-12 PROCEDURE — 84300 ASSAY OF URINE SODIUM: CPT

## 2017-09-12 PROCEDURE — 84156 ASSAY OF PROTEIN URINE: CPT

## 2017-09-12 PROCEDURE — 84550 ASSAY OF BLOOD/URIC ACID: CPT

## 2017-09-12 PROCEDURE — 93306 TTE W/DOPPLER COMPLETE: CPT

## 2017-09-12 RX ORDER — SEVELAMER CARBONATE 2400 MG/1
2 POWDER, FOR SUSPENSION ORAL
Qty: 0 | Refills: 0 | COMMUNITY
Start: 2017-09-12

## 2017-09-12 RX ORDER — DEXTROSE 50 % IN WATER 50 %
25 SYRINGE (ML) INTRAVENOUS ONCE
Qty: 0 | Refills: 0 | Status: DISCONTINUED | OUTPATIENT
Start: 2017-09-12 | End: 2017-09-22

## 2017-09-12 RX ORDER — INSULIN LISPRO 100/ML
VIAL (ML) SUBCUTANEOUS
Qty: 0 | Refills: 0 | Status: DISCONTINUED | OUTPATIENT
Start: 2017-09-12 | End: 2017-09-22

## 2017-09-12 RX ORDER — LINACLOTIDE 145 UG/1
1 CAPSULE, GELATIN COATED ORAL
Qty: 0 | Refills: 0 | COMMUNITY

## 2017-09-12 RX ORDER — GLIMEPIRIDE 1 MG
1 TABLET ORAL
Qty: 0 | Refills: 0 | COMMUNITY

## 2017-09-12 RX ORDER — NITROGLYCERIN 6.5 MG
0.6 CAPSULE, EXTENDED RELEASE ORAL ONCE
Qty: 0 | Refills: 0 | Status: COMPLETED | OUTPATIENT
Start: 2017-09-12 | End: 2017-09-12

## 2017-09-12 RX ORDER — ASPIRIN/CALCIUM CARB/MAGNESIUM 324 MG
81 TABLET ORAL ONCE
Qty: 0 | Refills: 0 | Status: COMPLETED | OUTPATIENT
Start: 2017-09-12 | End: 2017-09-12

## 2017-09-12 RX ORDER — SITAGLIPTIN AND METFORMIN HYDROCHLORIDE 500; 50 MG/1; MG/1
1 TABLET, FILM COATED ORAL
Qty: 0 | Refills: 0 | COMMUNITY

## 2017-09-12 RX ORDER — ATORVASTATIN CALCIUM 80 MG/1
40 TABLET, FILM COATED ORAL AT BEDTIME
Qty: 0 | Refills: 0 | Status: DISCONTINUED | OUTPATIENT
Start: 2017-09-12 | End: 2017-09-15

## 2017-09-12 RX ORDER — TAMSULOSIN HYDROCHLORIDE 0.4 MG/1
0.4 CAPSULE ORAL AT BEDTIME
Qty: 0 | Refills: 0 | Status: DISCONTINUED | OUTPATIENT
Start: 2017-09-12 | End: 2017-10-18

## 2017-09-12 RX ORDER — PENCICLOVIR 10 MG/G
1 CREAM TOPICAL
Qty: 0 | Refills: 0 | COMMUNITY

## 2017-09-12 RX ORDER — PANTOPRAZOLE SODIUM 20 MG/1
40 TABLET, DELAYED RELEASE ORAL
Qty: 0 | Refills: 0 | COMMUNITY
Start: 2017-09-12

## 2017-09-12 RX ORDER — HEPARIN SODIUM 5000 [USP'U]/ML
5000 INJECTION INTRAVENOUS; SUBCUTANEOUS EVERY 6 HOURS
Qty: 0 | Refills: 0 | Status: DISCONTINUED | OUTPATIENT
Start: 2017-09-12 | End: 2017-09-14

## 2017-09-12 RX ORDER — HEPARIN SODIUM 5000 [USP'U]/ML
0 INJECTION INTRAVENOUS; SUBCUTANEOUS
Qty: 0 | Refills: 0 | COMMUNITY
Start: 2017-09-12

## 2017-09-12 RX ORDER — HEPARIN SODIUM 5000 [USP'U]/ML
INJECTION INTRAVENOUS; SUBCUTANEOUS
Qty: 25000 | Refills: 0 | Status: DISCONTINUED | OUTPATIENT
Start: 2017-09-12 | End: 2017-09-14

## 2017-09-12 RX ORDER — CLOPIDOGREL BISULFATE 75 MG/1
75 TABLET, FILM COATED ORAL DAILY
Qty: 0 | Refills: 0 | Status: DISCONTINUED | OUTPATIENT
Start: 2017-09-13 | End: 2017-10-18

## 2017-09-12 RX ORDER — INSULIN LISPRO 100/ML
VIAL (ML) SUBCUTANEOUS AT BEDTIME
Qty: 0 | Refills: 0 | Status: DISCONTINUED | OUTPATIENT
Start: 2017-09-12 | End: 2017-09-22

## 2017-09-12 RX ORDER — NITROGLYCERIN 6.5 MG
1 CAPSULE, EXTENDED RELEASE ORAL
Qty: 0 | Refills: 0 | COMMUNITY

## 2017-09-12 RX ORDER — TAMSULOSIN HYDROCHLORIDE 0.4 MG/1
1 CAPSULE ORAL
Qty: 0 | Refills: 0 | COMMUNITY

## 2017-09-12 RX ORDER — ISOSORBIDE MONONITRATE 60 MG/1
1 TABLET, EXTENDED RELEASE ORAL
Qty: 0 | Refills: 0 | COMMUNITY
Start: 2017-09-12

## 2017-09-12 RX ORDER — INSULIN LISPRO 100/ML
0 VIAL (ML) SUBCUTANEOUS
Qty: 0 | Refills: 0 | COMMUNITY
Start: 2017-09-12

## 2017-09-12 RX ORDER — HEPARIN SODIUM 5000 [USP'U]/ML
5000 INJECTION INTRAVENOUS; SUBCUTANEOUS ONCE
Qty: 0 | Refills: 0 | Status: DISCONTINUED | OUTPATIENT
Start: 2017-09-12 | End: 2017-09-12

## 2017-09-12 RX ORDER — CLOPIDOGREL BISULFATE 75 MG/1
75 TABLET, FILM COATED ORAL ONCE
Qty: 0 | Refills: 0 | Status: COMPLETED | OUTPATIENT
Start: 2017-09-12 | End: 2017-09-12

## 2017-09-12 RX ORDER — NITROGLYCERIN 6.5 MG
0.4 CAPSULE, EXTENDED RELEASE ORAL ONCE
Qty: 0 | Refills: 0 | Status: COMPLETED | OUTPATIENT
Start: 2017-09-12 | End: 2017-09-12

## 2017-09-12 RX ORDER — ALBUTEROL 90 UG/1
2 AEROSOL, METERED ORAL EVERY 6 HOURS
Qty: 0 | Refills: 0 | Status: DISCONTINUED | OUTPATIENT
Start: 2017-09-12 | End: 2017-09-22

## 2017-09-12 RX ORDER — FLUTICASONE FUROATE AND VILANTEROL TRIFENATATE 100; 25 UG/1; UG/1
1 POWDER RESPIRATORY (INHALATION)
Qty: 0 | Refills: 0 | COMMUNITY

## 2017-09-12 RX ORDER — DEXTROSE 50 % IN WATER 50 %
12.5 SYRINGE (ML) INTRAVENOUS ONCE
Qty: 0 | Refills: 0 | Status: DISCONTINUED | OUTPATIENT
Start: 2017-09-12 | End: 2017-09-22

## 2017-09-12 RX ORDER — SEVELAMER CARBONATE 2400 MG/1
800 POWDER, FOR SUSPENSION ORAL
Qty: 0 | Refills: 0 | Status: DISCONTINUED | OUTPATIENT
Start: 2017-09-12 | End: 2017-09-19

## 2017-09-12 RX ORDER — CEFTRIAXONE 500 MG/1
INJECTION, POWDER, FOR SOLUTION INTRAMUSCULAR; INTRAVENOUS
Qty: 0 | Refills: 0 | Status: DISCONTINUED | OUTPATIENT
Start: 2017-09-12 | End: 2017-09-12

## 2017-09-12 RX ORDER — NIFEDIPINE 30 MG
60 TABLET, EXTENDED RELEASE 24 HR ORAL DAILY
Qty: 0 | Refills: 0 | Status: DISCONTINUED | OUTPATIENT
Start: 2017-09-12 | End: 2017-09-18

## 2017-09-12 RX ORDER — HEPARIN SODIUM 5000 [USP'U]/ML
INJECTION INTRAVENOUS; SUBCUTANEOUS
Qty: 25000 | Refills: 0 | Status: DISCONTINUED | OUTPATIENT
Start: 2017-09-12 | End: 2017-09-12

## 2017-09-12 RX ORDER — RANOLAZINE 500 MG/1
1000 TABLET, FILM COATED, EXTENDED RELEASE ORAL
Qty: 0 | Refills: 0 | Status: DISCONTINUED | OUTPATIENT
Start: 2017-09-12 | End: 2017-09-22

## 2017-09-12 RX ORDER — ALTEPLASE 100 MG
2 KIT INTRAVENOUS ONCE
Qty: 0 | Refills: 0 | Status: COMPLETED | OUTPATIENT
Start: 2017-09-12 | End: 2017-09-12

## 2017-09-12 RX ORDER — SODIUM CHLORIDE 9 MG/ML
1000 INJECTION, SOLUTION INTRAVENOUS
Qty: 0 | Refills: 0 | Status: DISCONTINUED | OUTPATIENT
Start: 2017-09-12 | End: 2017-09-22

## 2017-09-12 RX ORDER — DEXTROSE 50 % IN WATER 50 %
1 SYRINGE (ML) INTRAVENOUS ONCE
Qty: 0 | Refills: 0 | Status: DISCONTINUED | OUTPATIENT
Start: 2017-09-12 | End: 2017-09-22

## 2017-09-12 RX ORDER — LIPASE/PROTEASE/AMYLASE 16-48-48K
1 CAPSULE,DELAYED RELEASE (ENTERIC COATED) ORAL
Qty: 0 | Refills: 0 | COMMUNITY

## 2017-09-12 RX ORDER — METOPROLOL TARTRATE 50 MG
1 TABLET ORAL
Qty: 0 | Refills: 0 | COMMUNITY
Start: 2017-09-12

## 2017-09-12 RX ORDER — CEFTRIAXONE 500 MG/1
1 INJECTION, POWDER, FOR SOLUTION INTRAMUSCULAR; INTRAVENOUS ONCE
Qty: 0 | Refills: 0 | Status: COMPLETED | OUTPATIENT
Start: 2017-09-12 | End: 2017-09-12

## 2017-09-12 RX ORDER — BUDESONIDE AND FORMOTEROL FUMARATE DIHYDRATE 160; 4.5 UG/1; UG/1
0 AEROSOL RESPIRATORY (INHALATION)
Qty: 0 | Refills: 0 | COMMUNITY
Start: 2017-09-12

## 2017-09-12 RX ORDER — ASPIRIN/CALCIUM CARB/MAGNESIUM 324 MG
1 TABLET ORAL
Qty: 0 | Refills: 0 | COMMUNITY
Start: 2017-09-12

## 2017-09-12 RX ORDER — LUBIPROSTONE 24 UG/1
1 CAPSULE, GELATIN COATED ORAL
Qty: 0 | Refills: 0 | COMMUNITY

## 2017-09-12 RX ORDER — GLUCAGON INJECTION, SOLUTION 0.5 MG/.1ML
1 INJECTION, SOLUTION SUBCUTANEOUS ONCE
Qty: 0 | Refills: 0 | Status: DISCONTINUED | OUTPATIENT
Start: 2017-09-12 | End: 2017-09-22

## 2017-09-12 RX ORDER — NIFEDIPINE 30 MG
1 TABLET, EXTENDED RELEASE 24 HR ORAL
Qty: 0 | Refills: 0 | COMMUNITY
Start: 2017-09-12

## 2017-09-12 RX ORDER — ALBUTEROL 90 UG/1
1 AEROSOL, METERED ORAL
Qty: 0 | Refills: 0 | COMMUNITY

## 2017-09-12 RX ORDER — ISOSORBIDE MONONITRATE 60 MG/1
30 TABLET, EXTENDED RELEASE ORAL DAILY
Qty: 0 | Refills: 0 | Status: DISCONTINUED | OUTPATIENT
Start: 2017-09-12 | End: 2017-09-22

## 2017-09-12 RX ORDER — DEXLANSOPRAZOLE 30 MG/1
1 CAPSULE, DELAYED RELEASE ORAL
Qty: 0 | Refills: 0 | COMMUNITY

## 2017-09-12 RX ORDER — CEFTRIAXONE 500 MG/1
INJECTION, POWDER, FOR SOLUTION INTRAMUSCULAR; INTRAVENOUS
Qty: 0 | Refills: 0 | Status: DISCONTINUED | OUTPATIENT
Start: 2017-09-12 | End: 2017-09-22

## 2017-09-12 RX ORDER — LIDOCAINE 4 G/100G
1 CREAM TOPICAL
Qty: 0 | Refills: 0 | COMMUNITY

## 2017-09-12 RX ORDER — BUDESONIDE AND FORMOTEROL FUMARATE DIHYDRATE 160; 4.5 UG/1; UG/1
2 AEROSOL RESPIRATORY (INHALATION)
Qty: 0 | Refills: 0 | Status: DISCONTINUED | OUTPATIENT
Start: 2017-09-12 | End: 2017-09-22

## 2017-09-12 RX ORDER — METOPROLOL TARTRATE 50 MG
50 TABLET ORAL EVERY 8 HOURS
Qty: 0 | Refills: 0 | Status: DISCONTINUED | OUTPATIENT
Start: 2017-09-12 | End: 2017-09-15

## 2017-09-12 RX ORDER — ALTEPLASE 100 MG
2 KIT INTRAVENOUS ONCE
Qty: 0 | Refills: 0 | Status: DISCONTINUED | OUTPATIENT
Start: 2017-09-12 | End: 2017-09-12

## 2017-09-12 RX ORDER — CEFTRIAXONE 500 MG/1
1 INJECTION, POWDER, FOR SOLUTION INTRAMUSCULAR; INTRAVENOUS ONCE
Qty: 0 | Refills: 0 | Status: DISCONTINUED | OUTPATIENT
Start: 2017-09-12 | End: 2017-09-12

## 2017-09-12 RX ORDER — ASPIRIN/CALCIUM CARB/MAGNESIUM 324 MG
81 TABLET ORAL DAILY
Qty: 0 | Refills: 0 | Status: DISCONTINUED | OUTPATIENT
Start: 2017-09-13 | End: 2017-09-22

## 2017-09-12 RX ORDER — CEFTRIAXONE 500 MG/1
1 INJECTION, POWDER, FOR SOLUTION INTRAMUSCULAR; INTRAVENOUS EVERY 24 HOURS
Qty: 0 | Refills: 0 | Status: DISCONTINUED | OUTPATIENT
Start: 2017-09-13 | End: 2017-09-22

## 2017-09-12 RX ADMIN — HEPARIN SODIUM 5000 UNIT(S): 5000 INJECTION INTRAVENOUS; SUBCUTANEOUS at 06:28

## 2017-09-12 RX ADMIN — SEVELAMER CARBONATE 1600 MILLIGRAM(S): 2400 POWDER, FOR SUSPENSION ORAL at 07:57

## 2017-09-12 RX ADMIN — ALTEPLASE 2 MILLIGRAM(S): KIT at 10:12

## 2017-09-12 RX ADMIN — RANOLAZINE 1000 MILLIGRAM(S): 500 TABLET, FILM COATED, EXTENDED RELEASE ORAL at 22:46

## 2017-09-12 RX ADMIN — TAMSULOSIN HYDROCHLORIDE 0.4 MILLIGRAM(S): 0.4 CAPSULE ORAL at 22:46

## 2017-09-12 RX ADMIN — Medication 81 MILLIGRAM(S): at 15:06

## 2017-09-12 RX ADMIN — Medication 0.6 MILLIGRAM(S): at 11:13

## 2017-09-12 RX ADMIN — Medication 60 MILLIGRAM(S): at 06:54

## 2017-09-12 RX ADMIN — Medication 0.6 MILLIGRAM(S): at 04:57

## 2017-09-12 RX ADMIN — CLOPIDOGREL BISULFATE 75 MILLIGRAM(S): 75 TABLET, FILM COATED ORAL at 15:06

## 2017-09-12 RX ADMIN — PANTOPRAZOLE SODIUM 40 MILLIGRAM(S): 20 TABLET, DELAYED RELEASE ORAL at 14:44

## 2017-09-12 RX ADMIN — Medication 50 MILLIGRAM(S): at 14:43

## 2017-09-12 RX ADMIN — ATORVASTATIN CALCIUM 40 MILLIGRAM(S): 80 TABLET, FILM COATED ORAL at 22:46

## 2017-09-12 RX ADMIN — Medication 50 MILLIGRAM(S): at 06:28

## 2017-09-12 NOTE — PROGRESS NOTE ADULT - PROBLEM SELECTOR PROBLEM 6
Hyperphosphatemia
Hyperphosphatemia
Need for prophylactic measure
Type 2 diabetes mellitus with diabetic chronic kidney disease, unspecified CKD stage, unspecified long term insulin use status
Type 2 diabetes mellitus with diabetic chronic kidney disease, unspecified CKD stage, unspecified long term insulin use status

## 2017-09-12 NOTE — PROGRESS NOTE ADULT - PROBLEM SELECTOR PROBLEM 4
Renal stones
Renal stones
LIN (acute kidney injury)
LIN (acute kidney injury)
Pyelonephritis

## 2017-09-12 NOTE — PROGRESS NOTE ADULT - PROBLEM SELECTOR PROBLEM 2
Chronic kidney disease, unspecified CKD stage
Chronic kidney disease, unspecified CKD stage
Hypertensive emergency
NSTEMI (non-ST elevated myocardial infarction)
NSTEMI (non-ST elevated myocardial infarction)

## 2017-09-12 NOTE — PROGRESS NOTE ADULT - ASSESSMENT
A 71yo male, [place] speaking, wife is current patient in ICU, daughter at bedside to [facility] CAD s/p 12-14 stents (placed [phone]), DM, HTN, HLD, CVA (4 yrs ago, residual left sided facial weakness), lumbar radiculopathy with herniated disc, COPD (never smoker, passive smoke exposure), CKD (last creatinine 2 months ago was 1.2), bipolar disorder (no meds, sees psychiatrist), chronic kidney stones (has had ureteral stent in past) p/w 2 days intractable right flank pain radiating to groin, chills, vomiting.  BIBA.       # complicated UTI with b/l U stones and mild right hydronephrosis. u/a grossly positive, nitrite +, likley G -ve infection.   patient is s/p intubation, now extubated, sec to pulmonary edema, fluid overload and received HD.   last 24 hrs has mild fever. but no leukocytosis.  urine culture and blood cultures no growth.     Would recommendation:    1. Obtain repeat UA and culture  2. Continue Ceftriaxone for now  3. Urology follow up  4. Monitor WBC count    d/w ICU  team       A 71yo male, [place] speaking, wife is current patient in ICU, daughter at bedside to [facility] CAD s/p 12-14 stents (placed [phone]), DM, HTN, HLD, CVA (4 yrs ago, residual left sided facial weakness), lumbar radiculopathy with herniated disc, COPD (never smoker, passive smoke exposure), CKD (last creatinine 2 months ago was 1.2), bipolar disorder (no meds, sees psychiatrist), chronic kidney stones (has had ureteral stent in past) p/w 2 days intractable right flank pain radiating to groin, chills, vomiting.  BIBA.       # complicated UTI with b/l U stones and mild right hydronephrosis. u/a grossly positive, nitrite +, likley G -ve infection.     Would recommendation:    1. Obtain repeat UA and culture  2. Continue Ceftriaxone for now  2. Urology recommendation  noted  3. Monitor WBC count, is mildly elevated  4. IVF     d/w Family at the bed side and ICU  team    note not complete yet!. A 73yo male, [place] speaking, wife is current patient in ICU, daughter at bedside to [facility] CAD s/p 12-14 stents (placed [phone]), DM, HTN, HLD, CVA (4 yrs ago, residual left sided facial weakness), lumbar radiculopathy with herniated disc, COPD (never smoker, passive smoke exposure), CKD (last creatinine 2 months ago was 1.2), bipolar disorder (no meds, sees psychiatrist), chronic kidney stones (has had ureteral stent in past) p/w 2 days intractable right flank pain radiating to groin, chills, vomiting.  BIBA.       # complicated UTI with b/l U stones and mild right hydronephrosis. u/a grossly positive, nitrite +, likley G -ve infection.   patient is s/p intubation, now extubated, sec to pulmonary edema, fluid overload and received HD.   last 24 hrs has mild fever. but no leukocytosis.  urine culture and blood cultures no growth.     Would recommendation:    1. Obtain repeat UA and culture  2. Continue Ceftriaxone for now. Aim to treat for 10 days.  3. Urology follow up  4. Monitor WBC count    d/w ICU  team       A 73yo male, [place] speaking, wife is current patient in ICU, daughter at bedside to [facility] CAD s/p 12-14 stents (placed [phone]), DM, HTN, HLD, CVA (4 yrs ago, residual left sided facial weakness), lumbar radiculopathy with herniated disc, COPD (never smoker, passive smoke exposure), CKD (last creatinine 2 months ago was 1.2), bipolar disorder (no meds, sees psychiatrist), chronic kidney stones (has had ureteral stent in past) p/w 2 days intractable right flank pain radiating to groin, chills, vomiting.  BIBA.       # complicated UTI with b/l U stones and mild right hydronephrosis. u/a grossly positive, nitrite +, likley G -ve infection.     Would recommendation:    1. Obtain repeat UA and culture  2. Continue Ceftriaxone for now  2. Urology recommendation  noted  3. Monitor WBC count, is mildly elevated  4. IVF     d/w Family at the bed side and ICU  team    note not complete yet!.

## 2017-09-12 NOTE — PROGRESS NOTE ADULT - PROVIDER SPECIALTY LIST ADULT
Cardiology
Critical Care
Infectious Disease
Internal Medicine
Internal Medicine
Nephrology
Urology
Urology
Internal Medicine
Urology

## 2017-09-12 NOTE — PROGRESS NOTE ADULT - SUBJECTIVE AND OBJECTIVE BOX
Patient seen and examined.   Time of visit: 0830    MEDICATIONS  (STANDING):  aspirin  chewable 81 milliGRAM(s) Oral daily  cefTRIAXone   IVPB 1 Gram(s) IV Intermittent every 24 hours  buDESOnide  80 MICROgram(s)/formoterol 4.5 MICROgram(s) Inhaler 2 Puff(s) Inhalation two times a day  tamsulosin 0.4 milliGRAM(s) Oral at bedtime  atorvastatin 80 milliGRAM(s) Oral at bedtime  insulin lispro (HumaLOG) corrective regimen sliding scale   SubCutaneous every 6 hours  clopidogrel Tablet 75 milliGRAM(s) Oral daily  heparin  Injectable 5000 Unit(s) SubCutaneous every 12 hours  pantoprazole  Injectable 40 milliGRAM(s) IV Push daily  NIFEdipine XL 60 milliGRAM(s) Oral daily  isosorbide   mononitrate ER Tablet (IMDUR) 30 milliGRAM(s) Oral daily  metoprolol 50 milliGRAM(s) Oral every 8 hours  sevelamer hydrochloride 1600 milliGRAM(s) Oral three times a day with meals      MEDICATIONS  (PRN):   Medications up to date at time of exam.      PHYSICAL EXAMINATION:  Patient has no new complaints.  GENERAL: The patient is a well-developed, well-nourished, in no apparent distress.     Vital Signs Last 24 Hrs  T(C): 36.7 (12 Sep 2017 07:00), Max: 37.5 (11 Sep 2017 12:00)  T(F): 98 (12 Sep 2017 07:00), Max: 99.5 (11 Sep 2017 12:00)  HR: 81 (12 Sep 2017 08:00) (81 - 159)  BP: 120/54 (12 Sep 2017 08:00) (105/57 - 180/82)  BP(mean): 68 (12 Sep 2017 08:00) (68 - 106)  RR: 22 (12 Sep 2017 08:00) (13 - 28)  SpO2: 94% (12 Sep 2017 08:00) (88% - 100%)  Mode: CPAP with PS  FiO2: 40  PEEP: 5  PS: 5  ITime: 1  MAP: 6  PIP: 11   (if applicable)      HEENT: Head is normocephalic and atraumatic.     NECK: Supple, no palpable adenopathy.    LUNGS: Clear to auscultation, no wheezing, rales, or rhonchi.    HEART: Regular rate and rhythm + II/VI HSM.    ABDOMEN: Soft, nontender, and nondistended.  No hepatosplenomegaly is noted.    EXTREMITIES: Without any cyanosis, clubbing, rash, lesions or edema.    NEUROLOGIC: Awake, alert.    SKIN: Warm, dry, good turgor.      LABS:                        11.2   10.1  )-----------( 248      ( 12 Sep 2017 04:47 )             33.6     09-12    135  |  100  |  103<H>  ----------------------------<  135<H>  4.2   |  18<L>  |  9.63<H>    Ca    8.9      12 Sep 2017 04:47  Phos  8.8     09-12  Mg     2.8     09-12    TPro  7.4  /  Alb  2.4<L>  /  TBili  0.8  /  DBili  x   /  AST  24  /  ALT  13  /  AlkPhos  82  09-12    PT/INR - ( 11 Sep 2017 06:21 )   PT: 13.2 sec;   INR: 1.21 ratio         PTT - ( 11 Sep 2017 06:21 )  PTT:31.7 sec    ABG - ( 11 Sep 2017 04:52 )  pH: 7.40  /  pCO2: 34    /  pO2: 92    / HCO3: 21    / Base Excess: -3.0  /  SaO2: 96            MICROBIOLOGY: (if applicable)    RADIOLOGY & ADDITIONAL STUDIES:  EKG:   CXR:  ECHO:    IMPRESSION: 72y Male PAST MEDICAL & SURGICAL HISTORY:  HLD (hyperlipidemia)  Anemia  Acute renal failure  CKD (chronic kidney disease)  COPD (chronic obstructive pulmonary disease)  Fainting  Cardiac arrhythmia  Dizziness  Hydronephrosis  Cholecystitis  Bipolar 1 disorder  DM (diabetes mellitus)  HTN (hypertension)  Lumbar radiculopathy  Left heart failure  Reflux esophagitis  Coronary atherosclerosis  No significant past surgical history       p/w CP, SOB due to NSTEMI. Pt w/ hx of 15 stents, refused stents 2 months ago.    + s/p intubation, now extubated  + worsening creatinine s/p emergent HD    RECOMMENDATIONS:    Echo shows EF 30 - 35%, mild aortic stenosis, moderate diastolic dysfunction  Discussed w/ patient's PMD from [facility] Dr. Moss, patient's baseline creatinine 2 months ago 2.5, has been as high as 4. Patient intubated over the weekend.     Patient at HD now. Now consenting for cardiac cath. Plan is to have patient transferred after HD to [place] for cath w/ Dr. Maria Ines Graf Patient seen and examined.   Time of visit: 0830    MEDICATIONS  (STANDING):  aspirin  chewable 81 milliGRAM(s) Oral daily  cefTRIAXone   IVPB 1 Gram(s) IV Intermittent every 24 hours  buDESOnide  80 MICROgram(s)/formoterol 4.5 MICROgram(s) Inhaler 2 Puff(s) Inhalation two times a day  tamsulosin 0.4 milliGRAM(s) Oral at bedtime  atorvastatin 80 milliGRAM(s) Oral at bedtime  insulin lispro (HumaLOG) corrective regimen sliding scale   SubCutaneous every 6 hours  clopidogrel Tablet 75 milliGRAM(s) Oral daily  heparin  Injectable 5000 Unit(s) SubCutaneous every 12 hours  pantoprazole  Injectable 40 milliGRAM(s) IV Push daily  NIFEdipine XL 60 milliGRAM(s) Oral daily  isosorbide   mononitrate ER Tablet (IMDUR) 30 milliGRAM(s) Oral daily  metoprolol 50 milliGRAM(s) Oral every 8 hours  sevelamer hydrochloride 1600 milliGRAM(s) Oral three times a day with meals      MEDICATIONS  (PRN):   Medications up to date at time of exam.      PHYSICAL EXAMINATION:  Patient has no new complaints.  GENERAL: The patient is a well-developed, well-nourished, in no apparent distress.     Vital Signs Last 24 Hrs  T(C): 36.7 (12 Sep 2017 07:00), Max: 37.5 (11 Sep 2017 12:00)  T(F): 98 (12 Sep 2017 07:00), Max: 99.5 (11 Sep 2017 12:00)  HR: 81 (12 Sep 2017 08:00) (81 - 159)  BP: 120/54 (12 Sep 2017 08:00) (105/57 - 180/82)  BP(mean): 68 (12 Sep 2017 08:00) (68 - 106)  RR: 22 (12 Sep 2017 08:00) (13 - 28)  SpO2: 94% (12 Sep 2017 08:00) (88% - 100%)  Mode: CPAP with PS  FiO2: 40  PEEP: 5  PS: 5  ITime: 1  MAP: 6  PIP: 11   (if applicable)      HEENT: Head is normocephalic and atraumatic.     NECK: Supple, no palpable adenopathy.    LUNGS: Clear to auscultation, no wheezing, rales, or rhonchi.    HEART: Regular rate and rhythm + II/VI HSM.    ABDOMEN: Soft, nontender, and nondistended.  No hepatosplenomegaly is noted.    EXTREMITIES: Without any cyanosis, clubbing, rash, lesions or edema.    NEUROLOGIC: Awake, alert.    SKIN: Warm, dry, good turgor.      LABS:                        11.2   10.1  )-----------( 248      ( 12 Sep 2017 04:47 )             33.6     09-12    135  |  100  |  103<H>  ----------------------------<  135<H>  4.2   |  18<L>  |  9.63<H>    Ca    8.9      12 Sep 2017 04:47  Phos  8.8     09-12  Mg     2.8     09-12    TPro  7.4  /  Alb  2.4<L>  /  TBili  0.8  /  DBili  x   /  AST  24  /  ALT  13  /  AlkPhos  82  09-12    PT/INR - ( 11 Sep 2017 06:21 )   PT: 13.2 sec;   INR: 1.21 ratio         PTT - ( 11 Sep 2017 06:21 )  PTT:31.7 sec    ABG - ( 11 Sep 2017 04:52 )  pH: 7.40  /  pCO2: 34    /  pO2: 92    / HCO3: 21    / Base Excess: -3.0  /  SaO2: 96            MICROBIOLOGY: (if applicable)    RADIOLOGY & ADDITIONAL STUDIES:  EKG:   CXR:  ECHO:    IMPRESSION: 72y Male PAST MEDICAL & SURGICAL HISTORY:  HLD (hyperlipidemia)  Anemia  Acute renal failure  CKD (chronic kidney disease)  COPD (chronic obstructive pulmonary disease)  Fainting  Cardiac arrhythmia  Dizziness  Hydronephrosis  Cholecystitis  Bipolar 1 disorder  DM (diabetes mellitus)  HTN (hypertension)  Lumbar radiculopathy  Left heart failure  Reflux esophagitis  Coronary atherosclerosis  No significant past surgical history       p/w CP, SOB due to NSTEMI. Pt w/ hx of 15 stents, refused stents 2 months ago.    + s/p intubation, now extubated  + worsening creatinine s/p emergent HD    RECOMMENDATIONS:    Echo shows EF 30 - 35%, mild aortic stenosis, moderate diastolic dysfunction  Discussed w/ patient's PMD from [facility] Dr. Moss, patient's baseline creatinine 2 months ago 2.5, has been as high as 4. Patient intubated over the weekend.     Patient at HD now. Now consenting for cardiac cath. Plan is to have patient transferred after HD to [place] for cath w/ Dr. Maria Ines Graf, confirmed w/ cath booking Diana.

## 2017-09-12 NOTE — DISCHARGE NOTE ADULT - CARE PLAN
Principal Discharge DX:	NSTEMI (non-ST elevated myocardial infarction)  Goal:	to get cardiac cath and proceed accordingly.  Instructions for follow-up, activity and diet:	Continue aspirin, statin and Plavix. Transfer to [place] for cardiac cath and get hemodialysis after. Proceed as per cardio.  Secondary Diagnosis:	Acute systolic heart failure  Instructions for follow-up, activity and diet:	Continue aspirin, statin and Plavix. Transfer to [place] for cardiac cath and get hemodialysis after. Proceed as per cardio. Optimize BP control. Continue anti-HTN medication as prescribed and adjust as necessary.  Secondary Diagnosis:	Acute renal failure  Instructions for follow-up, activity and diet:	s/p 2x hemodialysis. If renal function does not improve, patient will need new HD set up. Plan to HD after cardiac cath.  Secondary Diagnosis:	Pyelonephritis  Instructions for follow-up, activity and diet:	Continue Rocephin as blood and urine culture negative. But UA positive.  Secondary Diagnosis:	Renal stone  Instructions for follow-up, activity and diet:	Partial obstruction with mild hydronephrosis on right side. Possible candidate for stent or nephrostomy after cardiac cath and stabilized.

## 2017-09-12 NOTE — PROGRESS NOTE ADULT - PROBLEM SELECTOR PLAN 1
Hypoxic respiratory distress from Pul edema from CHF from acute NSTEMI  Patient got extubated yesterday and Lasix drip was stopped since pt not making urine at all.   -Will dialysis him today for fluid overload.

## 2017-09-12 NOTE — PROGRESS NOTE ADULT - PROBLEM SELECTOR PROBLEM 1
LIN (acute kidney injury)
LIN (acute kidney injury)
Renal stones
NSTEMI (non-ST elevated myocardial infarction)
Respiratory distress
Respiratory distress
Renal stones

## 2017-09-12 NOTE — PROGRESS NOTE ADULT - PROBLEM SELECTOR PROBLEM 3
Acidosis, metabolic
Acidosis, metabolic
Hypertensive emergency
Hypertensive emergency
LIN (acute kidney injury)

## 2017-09-12 NOTE — PROGRESS NOTE ADULT - PROBLEM SELECTOR PLAN 6
secondary to naman on ckd  -we will add renagel 1600 mg po tid with meals  -f/u phos level daily
secondary to naman on ckd  -we will continue  renagel 1600 mg po tid with meals  -f/u phos level daily
BSL controlled  - hold oral hypoglycemics  - diabetic diet, HSS,
BSL controlled  - hold oral hypoglycemics  - diabetic diet, HSS,
improve score = 2, on heparin gtt  GI ppx

## 2017-09-12 NOTE — PROGRESS NOTE ADULT - PROBLEM SELECTOR PLAN 1
r/o secondary to rt.sided obstructive uropathy plus ATN  secondary to NSTEMI ?  , on hd  -pt was dialysed today.pts dialysis cath was not functioning well despite cathflow use.pt may need new dialysis cath for next hd  -Hd orders written and discussed with dialysis RN.  -Keep patient euvolemic and renal diet  -Avoid Nephrotoxic Meds/ Agents such as (NSAIDs, IV contrast, Aminoglycosides such as gentamicin, -Gadolinium contrast, Phosphate containing enemas, etc..)  -Adjust Medications according to eGFR  -pt needs cardiac cath.pt is high risk for CN post  cardiac cath and may develop irreversible renal damage and may need hd for long term/permanently   d/w pts daughter at length pts above risk and benefits from cardiac cath

## 2017-09-12 NOTE — PROGRESS NOTE ADULT - PROBLEM SELECTOR PROBLEM 5
Hypertension secondary to other renal disorders
Hypertension secondary to other renal disorders
Pyelonephritis
Pyelonephritis
Type 2 diabetes mellitus with diabetic chronic kidney disease, unspecified CKD stage, unspecified long term insulin use status

## 2017-09-12 NOTE — DISCHARGE NOTE ADULT - MEDICATION SUMMARY - MEDICATIONS TO STOP TAKING
I will STOP taking the medications listed below when I get home from the hospital:    Creon 24,000 units oral delayed release capsule  -- 1 cap(s) by mouth 3 times a day    Flomax 0.4 mg oral capsule  -- 1 cap(s) by mouth once a day    Dexilant 60 mg oral delayed release capsule  -- 1 cap(s) by mouth once a day    Janumet 50 mg-500 mg oral tablet  -- 1 tab(s) by mouth 2 times a day    Linzess 145 mcg oral capsule  -- 1 cap(s) by mouth once a day    Amaryl 4 mg oral tablet  -- 1 tab(s) by mouth once a day    Amitiza 24 mcg oral capsule  -- 1 cap(s) by mouth 2 times a day

## 2017-09-12 NOTE — PROGRESS NOTE ADULT - SUBJECTIVE AND OBJECTIVE BOX
pt seen and examined.pts current chart reviewed and case discussed with resident covering.    SUBJECTIVE:  pt feels ok now , had cp prior to dialysis but felt better after Nitro and tolerated the dialysis well  pt is now being transferred to [facility] for cardiac catth  pts daughter at bedside    REVIEW OF SYSTEMS:  CONSTITUTIONAL: No weakness, fevers or chills  EYES/ENT: No visual changes;  No vertigo or throat pain   NECK: No pain or stiffness  RESPIRATORY: No cough, wheezing, hemoptysis; No shortness of breath  CARDIOVASCULAR: No chest pain or palpitations  GASTROINTESTINAL: No abdominal or epigastric pain. No nausea, vomiting, or hematemesis; No diarrhea or constipation. No melena or hematochezia.  GENITOURINARY: No dysuria, frequency , hematuria, flank pain or nocturia  NEUROLOGICAL: No numbness or weakness  SKIN: No itching, burning, rashes, or lesions   All other review of systems is negative unless indicated above    Current meds:    aspirin  chewable 81 milliGRAM(s) Oral daily  cefTRIAXone   IVPB 1 Gram(s) IV Intermittent every 24 hours  buDESOnide  80 MICROgram(s)/formoterol 4.5 MICROgram(s) Inhaler 2 Puff(s) Inhalation two times a day  tamsulosin 0.4 milliGRAM(s) Oral at bedtime  atorvastatin 80 milliGRAM(s) Oral at bedtime  insulin lispro (HumaLOG) corrective regimen sliding scale   SubCutaneous every 6 hours  clopidogrel Tablet 75 milliGRAM(s) Oral daily  heparin  Injectable 5000 Unit(s) SubCutaneous every 12 hours  pantoprazole  Injectable 40 milliGRAM(s) IV Push daily  NIFEdipine XL 60 milliGRAM(s) Oral daily  isosorbide   mononitrate ER Tablet (IMDUR) 30 milliGRAM(s) Oral daily  metoprolol 50 milliGRAM(s) Oral every 8 hours  sevelamer hydrochloride 1600 milliGRAM(s) Oral three times a day with meals      Vital Signs    T(F): 97.4 (17 @ 15:16), Max: 99.5 (17 @ 19:49)  HR: 82 (17 @ 16:00) (81 - 102)  BP: 134/75 (17 @ 16:00) (105/57 - 165/82)  ABP: --  RR: 25 (17 @ 16:00) (19 - 27)  SpO2: 94% (17 @ 16:00) (91% - 97%)  Wt(kg): --  CVP(cm H2O): --  CO: --  PCWP: --    I and O's:    09-10 @ 07:  -   @ 07:00  --------------------------------------------------------  IN:    furosemide Infusion: 22.5 mL    furosemide Infusion: 135 mL    nitroglycerin  Infusion: 6 mL    nitroglycerin  Infusion: 36 mL    phenylephrine   Infusion: 320.4 mL    propofol Infusion: 558.6 mL    Solution: 50 mL  Total IN: 1128.5 mL    OUT:    Indwelling Catheter - Urethral: 1924 mL    Nasoenteral Tube: 350 mL  Total OUT: 2274 mL    Total NET: -1145.5 mL       @ :  -   @ 07:00  --------------------------------------------------------  IN:    furosemide Infusion: 60 mL    Oral Fluid: 630 mL    propofol Infusion: 26.6 mL    Solution: 50 mL  Total IN: 766.6 mL    OUT:    Indwelling Catheter - Urethral: 195 mL    Nasoenteral Tube: 150 mL  Total OUT: 345 mL    Total NET: 421.6 mL       @ :  -   @ 16:17  --------------------------------------------------------  IN:    Oral Fluid: 240 mL  Total IN: 240 mL    OUT:    Indwelling Catheter - Urethral: 40 mL  Total OUT: 40 mL    Total NET: 200 mL        Daily     Daily Weight in k.3 (12 Sep 2017 08:00)    PHYSICAL EXAM:  Constitutional: well developed, mildly obese  and in nad  HEENT: PERRLA,  no icteric sclera and mild pallor of conjunctiva noted  Neck: No JVD, thyromegaly or adenopathy  Respiratory: reduced air entry lower lungs with no rales, wheezing or rhonchi  Cardiovascular: S1 and S2 normally heard  Gastrointestinal: soft, nondistended, nontender and normal bowel sounds heard  Extremities: No peripheral edema or cyanosis  Neurological: A/O x 3, no focal deficits  : No flank or cva tenderness palpated.  Skin: No rashes      LABS:    CBC:                          11.2   10.1  )-----------( 248      ( 12 Sep 2017 04:47 )             33.6           BMP:        135  |  100  |  103<H>  ----------------------------<  135<H>  4.2   |  18<L>  |  9.63<H>      139  |  102  |  76<H>  ----------------------------<  89  3.9   |  23  |  6.86<H>  09-10    133<L>  |  100  |  90<H>  ----------------------------<  180<H>  4.4   |  17<L>  |  8.23<H>    Ca    8.9      12 Sep 2017 04:47  Ca    8.8      11 Sep 2017 06:21  Ca    9.0      10 Sep 2017 05:12  Phos  8.8       Phos  7.0       Phos  6.3     09-10  Mg     2.8       Mg     2.6       Mg     2.4     09-10    TPro  7.4  /  Alb  2.4<L>  /  TBili  0.8  /  DBili  x   /  AST  24  /  ALT  13  /  AlkPhos  82    TPro  7.2  /  Alb  2.4<L>  /  TBili  0.6  /  DBili  x   /  AST  35  /  ALT  11  /  AlkPhos  78    TPro  7.6  /  Alb  2.8<L>  /  TBili  0.6  /  DBili  x   /  AST  26  /  ALT  11  /  AlkPhos  75  09-10          URINE STUDIES:        Creatinine, Random Urine: 126 mg/dL ( @ 18:06)  Sodium, Random Urine: 47 mmol/L ( @ 14:03)  Osmolality, Random Urine: 357 mos/kg ( @ 14:03)                  RADIOLOGY & ADDITIONAL STUDIES:    < from: Xray Chest 1 View AP -PORTABLE-Routine (17 @ 09:59) >  EXAM:  CHEST PORTABLE ROUTINE                            PROCEDURE DATE:  2017          INTERPRETATION:  AP semierect chest on  at 8:53 AM.    Heart magnified by technique.    Endotracheal tube and nasogastric tube remain.    On September 10 there were significant bilateral infiltrates.    Present film suggests slight improvement particularly in the right lung.    There is an increasing right effusion on present study.    IMPRESSION: As above.      < end of copied text >

## 2017-09-12 NOTE — DISCHARGE NOTE ADULT - PATIENT PORTAL LINK FT
“You can access the [facility] Patient Portal, offered by [facility], by registering with the following website: [email or web address]”

## 2017-09-12 NOTE — PROGRESS NOTE ADULT - SUBJECTIVE AND OBJECTIVE BOX
Patient seen and examined.   low grade fevers    MEDICATIONS  (STANDING):  aspirin  chewable 81 milliGRAM(s) Oral daily  cefTRIAXone   IVPB 1 Gram(s) IV Intermittent every 24 hours  buDESOnide  80 MICROgram(s)/formoterol 4.5 MICROgram(s) Inhaler 2 Puff(s) Inhalation two times a day  tamsulosin 0.4 milliGRAM(s) Oral at bedtime  atorvastatin 80 milliGRAM(s) Oral at bedtime  insulin lispro (HumaLOG) corrective regimen sliding scale   SubCutaneous every 6 hours  clopidogrel Tablet 75 milliGRAM(s) Oral daily  heparin  Injectable 5000 Unit(s) SubCutaneous every 12 hours  pantoprazole  Injectable 40 milliGRAM(s) IV Push daily  NIFEdipine XL 60 milliGRAM(s) Oral daily  isosorbide   mononitrate ER Tablet (IMDUR) 30 milliGRAM(s) Oral daily  metoprolol 50 milliGRAM(s) Oral every 8 hours  sevelamer hydrochloride 1600 milliGRAM(s) Oral three times a day with meals  alteplase for catheter clearance 2 milliGRAM(s) Catheter once  nitroglycerin     SubLingual 0.6 milliGRAM(s) SubLingual once      MEDICATIONS  (PRN):       Medications up to date at time of exam.      PHYSICAL EXAMINATION:      Vital Signs Last 24 Hrs  T(C): 36.7 (12 Sep 2017 07:00), Max: 37.5 (11 Sep 2017 12:00)  T(F): 98 (12 Sep 2017 07:00), Max: 99.5 (11 Sep 2017 12:00)  HR: 81 (12 Sep 2017 08:00) (81 - 159)  BP: 120/54 (12 Sep 2017 08:00) (105/57 - 180/82)  BP(mean): 68 (12 Sep 2017 08:00) (68 - 106)  RR: 22 (12 Sep 2017 08:00) (17 - 28)  SpO2: 94% (12 Sep 2017 08:00) (88% - 97%)   (if applicable)      GENERAL: The patient is a well-developed, well-nourished, in no apparent distress.     HEENT: Head is normocephalic and atraumatic. Extraocular muscles are intact. Mucous membranes are moist.     NECK: Supple, no palpable adenopathy.    LUNGS: BLAE present all over, some basal crepts,    HEART: Regular rate and rhythm without murmur.    ABDOMEN: Soft, nontender, and nondistended.  No hepatosplenomegaly is noted. right suprapubic tendermness  right groin HD catheter.     EXTREMITIES: Without any cyanosis, clubbing, rash, lesions or edema.    NEUROLOGIC: Awake, alert, oriented.     SKIN: Warm, dry, good turgor.    LABS:                        11.2   10.1  )-----------( 248      ( 12 Sep 2017 04:47 )             33.6     09-12    135  |  100  |  103<H>  ----------------------------<  135<H>  4.2   |  18<L>  |  9.63<H>    Ca    8.9      12 Sep 2017 04:47  Phos  8.8     09-12  Mg     2.8     09-12    TPro  7.4  /  Alb  2.4<L>  /  TBili  0.8  /  DBili  x   /  AST  24  /  ALT  13  /  AlkPhos  82  09-12    PT/INR - ( 11 Sep 2017 06:21 )   PT: 13.2 sec;   INR: 1.21 ratio         PTT - ( 11 Sep 2017 06:21 )  PTT:31.7 sec    ABG - ( 11 Sep 2017 04:52 )  pH: 7.40  /  pCO2: 34    /  pO2: 92    / HCO3: 21    / Base Excess: -3.0  /  SaO2: 96              Culture - Blood (09.08.17 @ 14:17)    Specimen Source: .Blood Blood    Culture Results:   No growth to date.    Culture - Blood (09.07.17 @ 10:02)    Specimen Source: .Blood Blood-Peripheral    Culture Results:   No growth to date.    Culture - Urine (09.06.17 @ 23:53)    Specimen Source: .Urine Clean Catch (Midstream)    Culture Results:   No growth      < from: Xray Chest 1 View AP -PORTABLE-Routine (09.11.17 @ 09:59) >  INTERPRETATION:  AP semierect chest on September 11 at 8:53 AM.    Heart magnified by technique.    Endotracheal tube and nasogastric tube remain.    On September 10 there were significant bilateral infiltrates.    Present film suggests slight improvement particularly in the right lung.    There is an increasing right effusion on present study.    < end of copied text >      < from: Transthoracic Echocardiogram (09.07.17 @ 06:51) >  CONCLUSIONS:  1. Mitral annular calcification, and calcified mitral  leaflets with normal diastolic opening. Mild mitral  regurgitation.  2. Aortic valve leaflet morphology not well visualized,  appears calcified with reduced opening. Peak transaortic  valve gradient equals 19 mm Hg, mean transaortic valve  gradient equals 10 mm Hg, consistent with mild aortic  stenosis. Trace aortic regurgitation.  3. Normal left ventricular internal dimensions and wall  thicknesses.  4. Severe segmental left ventricular dysfunction.  The mid  to distal anterior wall, distal inferior wall, anteroseptum  and apex are akinetic. Moderate diastolic dysfunction  (Stage II).  5. Normal right ventricular size and function.  6. RVS Pressure is 46 mm Hg.  Mild pulmonary hypertension.    < end of copied text >    MICROBIOLOGY: (if applicable)    RADIOLOGY & ADDITIONAL STUDIES:  EKG:   CXR:  ECHO:      RECOMMENDATIONS: Patient seen and examined.   low grade fevers  getting HD    MEDICATIONS  (STANDING):  aspirin  chewable 81 milliGRAM(s) Oral daily  cefTRIAXone   IVPB 1 Gram(s) IV Intermittent every 24 hours  buDESOnide  80 MICROgram(s)/formoterol 4.5 MICROgram(s) Inhaler 2 Puff(s) Inhalation two times a day  tamsulosin 0.4 milliGRAM(s) Oral at bedtime  atorvastatin 80 milliGRAM(s) Oral at bedtime  insulin lispro (HumaLOG) corrective regimen sliding scale   SubCutaneous every 6 hours  clopidogrel Tablet 75 milliGRAM(s) Oral daily  heparin  Injectable 5000 Unit(s) SubCutaneous every 12 hours  pantoprazole  Injectable 40 milliGRAM(s) IV Push daily  NIFEdipine XL 60 milliGRAM(s) Oral daily  isosorbide   mononitrate ER Tablet (IMDUR) 30 milliGRAM(s) Oral daily  metoprolol 50 milliGRAM(s) Oral every 8 hours  sevelamer hydrochloride 1600 milliGRAM(s) Oral three times a day with meals  alteplase for catheter clearance 2 milliGRAM(s) Catheter once  nitroglycerin     SubLingual 0.6 milliGRAM(s) SubLingual once      MEDICATIONS  (PRN):       Medications up to date at time of exam.      PHYSICAL EXAMINATION:      Vital Signs Last 24 Hrs  T(C): 36.7 (12 Sep 2017 07:00), Max: 37.5 (11 Sep 2017 12:00)  T(F): 98 (12 Sep 2017 07:00), Max: 99.5 (11 Sep 2017 12:00)  HR: 81 (12 Sep 2017 08:00) (81 - 159)  BP: 120/54 (12 Sep 2017 08:00) (105/57 - 180/82)  BP(mean): 68 (12 Sep 2017 08:00) (68 - 106)  RR: 22 (12 Sep 2017 08:00) (17 - 28)  SpO2: 94% (12 Sep 2017 08:00) (88% - 97%)   (if applicable)      GENERAL: The patient is a well-developed, well-nourished, in no apparent distress.     HEENT: Head is normocephalic and atraumatic. Extraocular muscles are intact. Mucous membranes are moist.     NECK: Supple, no palpable adenopathy.    LUNGS: BLAE present all over, some basal crepts,    HEART: Regular rate and rhythm without murmur.    ABDOMEN: Soft, nontender, and nondistended.  No hepatosplenomegaly is noted. right suprapubic tendermness  right groin HD catheter.     EXTREMITIES: Without any cyanosis, clubbing, rash, lesions or edema.    NEUROLOGIC: Awake, alert, oriented.     SKIN: Warm, dry, good turgor.    LABS:                        11.2   10.1  )-----------( 248      ( 12 Sep 2017 04:47 )             33.6     09-12    135  |  100  |  103<H>  ----------------------------<  135<H>  4.2   |  18<L>  |  9.63<H>    Ca    8.9      12 Sep 2017 04:47  Phos  8.8     09-12  Mg     2.8     09-12    TPro  7.4  /  Alb  2.4<L>  /  TBili  0.8  /  DBili  x   /  AST  24  /  ALT  13  /  AlkPhos  82  09-12    PT/INR - ( 11 Sep 2017 06:21 )   PT: 13.2 sec;   INR: 1.21 ratio         PTT - ( 11 Sep 2017 06:21 )  PTT:31.7 sec    ABG - ( 11 Sep 2017 04:52 )  pH: 7.40  /  pCO2: 34    /  pO2: 92    / HCO3: 21    / Base Excess: -3.0  /  SaO2: 96              Culture - Blood (09.08.17 @ 14:17)    Specimen Source: .Blood Blood    Culture Results:   No growth to date.    Culture - Blood (09.07.17 @ 10:02)    Specimen Source: .Blood Blood-Peripheral    Culture Results:   No growth to date.    Culture - Urine (09.06.17 @ 23:53)    Specimen Source: .Urine Clean Catch (Midstream)    Culture Results:   No growth      < from: Xray Chest 1 View AP -PORTABLE-Routine (09.11.17 @ 09:59) >  INTERPRETATION:  AP semierect chest on September 11 at 8:53 AM.    Heart magnified by technique.    Endotracheal tube and nasogastric tube remain.    On September 10 there were significant bilateral infiltrates.    Present film suggests slight improvement particularly in the right lung.    There is an increasing right effusion on present study.    < end of copied text >      < from: Transthoracic Echocardiogram (09.07.17 @ 06:51) >  CONCLUSIONS:  1. Mitral annular calcification, and calcified mitral  leaflets with normal diastolic opening. Mild mitral  regurgitation.  2. Aortic valve leaflet morphology not well visualized,  appears calcified with reduced opening. Peak transaortic  valve gradient equals 19 mm Hg, mean transaortic valve  gradient equals 10 mm Hg, consistent with mild aortic  stenosis. Trace aortic regurgitation.  3. Normal left ventricular internal dimensions and wall  thicknesses.  4. Severe segmental left ventricular dysfunction.  The mid  to distal anterior wall, distal inferior wall, anteroseptum  and apex are akinetic. Moderate diastolic dysfunction  (Stage II).  5. Normal right ventricular size and function.  6. RVS Pressure is 46 mm Hg.  Mild pulmonary hypertension.    < end of copied text >    MICROBIOLOGY: (if applicable)    RADIOLOGY & ADDITIONAL STUDIES:  EKG:   CXR:  ECHO:      RECOMMENDATIONS:

## 2017-09-12 NOTE — PROGRESS NOTE ADULT - PROBLEM SELECTOR PLAN 3
SBP in 140/150, s/p labetalol  - s/p NGT drip  -on Imdur and nifedipine, lopressor  -not on ACEI/ARB as acute renal failure

## 2017-09-12 NOTE — PROGRESS NOTE ADULT - SUBJECTIVE AND OBJECTIVE BOX
INTERVAL HPI/ OVERNIGHT EVENTS: No major event overnight.       Antimicrobial:  cefTRIAXone   IVPB 1 Gram(s) IV Intermittent every 24 hours    Cardiovascular:  tamsulosin 0.4 milliGRAM(s) Oral at bedtime  NIFEdipine XL 60 milliGRAM(s) Oral daily  isosorbide   mononitrate ER Tablet (IMDUR) 30 milliGRAM(s) Oral daily  metoprolol 50 milliGRAM(s) Oral every 8 hours    Pulmonary:  buDESOnide  80 MICROgram(s)/formoterol 4.5 MICROgram(s) Inhaler 2 Puff(s) Inhalation two times a day    Hematalogic:  aspirin  chewable 81 milliGRAM(s) Oral daily  clopidogrel Tablet 75 milliGRAM(s) Oral daily  heparin  Injectable 5000 Unit(s) SubCutaneous every 12 hours    Other:  atorvastatin 80 milliGRAM(s) Oral at bedtime  insulin lispro (HumaLOG) corrective regimen sliding scale   SubCutaneous every 6 hours  pantoprazole  Injectable 40 milliGRAM(s) IV Push daily  sevelamer hydrochloride 1600 milliGRAM(s) Oral three times a day with meals    aspirin  chewable 81 milliGRAM(s) Oral daily  cefTRIAXone   IVPB 1 Gram(s) IV Intermittent every 24 hours  buDESOnide  80 MICROgram(s)/formoterol 4.5 MICROgram(s) Inhaler 2 Puff(s) Inhalation two times a day  tamsulosin 0.4 milliGRAM(s) Oral at bedtime  atorvastatin 80 milliGRAM(s) Oral at bedtime  insulin lispro (HumaLOG) corrective regimen sliding scale   SubCutaneous every 6 hours  clopidogrel Tablet 75 milliGRAM(s) Oral daily  heparin  Injectable 5000 Unit(s) SubCutaneous every 12 hours  pantoprazole  Injectable 40 milliGRAM(s) IV Push daily  NIFEdipine XL 60 milliGRAM(s) Oral daily  isosorbide   mononitrate ER Tablet (IMDUR) 30 milliGRAM(s) Oral daily  metoprolol 50 milliGRAM(s) Oral every 8 hours  sevelamer hydrochloride 1600 milliGRAM(s) Oral three times a day with meals    Drug Dosing Weight  Height (cm): 172.72 (06 Sep 2017 21:30)  Weight (kg): 88.7 (06 Sep 2017 21:30)  BMI (kg/m2): 29.7 (06 Sep 2017 21:30)  BSA (m2): 2.02 (06 Sep 2017 21:30)    CENTRAL LINE: [ ] YES [x ] NO  LOCATION:   DATE INSERTED:  REMOVE: [ ] YES [ ] NO  EXPLAIN:    CERVANTES: [x ] YES [ ] NO    DATE INSERTED:  REMOVE:  [ ] YES [x ] NO  EXPLAIN: I/O monitor    A-LINE:  [ ] YES [x ] NO  LOCATION:   DATE INSERTED:  REMOVE:  [ ] YES [ ] NO  EXPLAIN:    PMH -reviewed admission note, no change since admission      ICU Vital Signs Last 24 Hrs  T(C): 37.5 (12 Sep 2017 00:00), Max: 37.5 (11 Sep 2017 12:00)  T(F): 99.5 (12 Sep 2017 00:00), Max: 99.5 (11 Sep 2017 12:00)  HR: 97 (12 Sep 2017 06:00) (85 - 159)  BP: 134/56 (12 Sep 2017 06:00) (105/57 - 180/82)  BP(mean): 75 (12 Sep 2017 06:00) (68 - 106)  ABP: --  ABP(mean): --  RR: 22 (12 Sep 2017 06:00) (13 - 28)  SpO2: 95% (12 Sep 2017 06:00) (88% - 100%)      ABG - ( 11 Sep 2017 04:52 )  pH: 7.40  /  pCO2: 34    /  pO2: 92    / HCO3: 21    / Base Excess: -3.0  /  SaO2: 96                    09-11 @ 07:01  -  09-12 @ 07:00  --------------------------------------------------------  IN: 766.6 mL / OUT: 325 mL / NET: 441.6 mL        Mode: CPAP with PS  FiO2: 40  PEEP: 5  PS: 5  ITime: 1  MAP: 6  PIP: 11      PHYSICAL EXAM:    GENERAL: [x ]NAD, [x ]well-groomed, [x ]well-developed  HEAD:  [ x]Atraumatic, [ x]Normocephalic  EYES: [x ]EOMI, [x ]PERRLA, [ ]conjunctiva and sclera clear  ENMT: [x ]No tonsillar erythema, exudates, or enlargement; [ ]Moist mucous membranes, [ ]Good dentition, [ ]No lesions  NECK: [ x]Supple, normal appearance, [ ]No JVD; [ ]Normal thyroid; [ ]Trachea midline  NERVOUS SYSTEM:  [x ]Alert & Oriented X3, [x ]Good concentration; [ ]Motor Strength 5/5 B/L upper and lower extremities; [ ]DTRs 2+ intact and symmetric  CHEST/LUNG: [x ]No chest deformity; [ x) rales, rhonchi, and intubated  HEART: [x ]Regular rate and rhythm; [ ]No murmurs, rubs, or gallops  ABDOMEN: [x ]Soft, Nontender, Nondistended; [ ]Bowel sounds present  EXTREMITIES:  [ x]2+ Peripheral Pulses, [ ]No clubbing, cyanosis, or edema  LYMPH: [x ]No lymphadenopathy noted  SKIN: [ x]No rashes or lesions; [ ]Good capillary refill      LABS:  CBC Full  -  ( 12 Sep 2017 04:47 )  WBC Count : 10.1 K/uL  Hemoglobin : 11.2 g/dL  Hematocrit : 33.6 %  Platelet Count - Automated : 248 K/uL  Mean Cell Volume : 85.3 fl  Mean Cell Hemoglobin : 28.4 pg  Mean Cell Hemoglobin Concentration : 33.3 gm/dL  Auto Neutrophil # : 8.1 K/uL  Auto Lymphocyte # : 1.0 K/uL  Auto Monocyte # : 0.9 K/uL  Auto Eosinophil # : 0.0 K/uL  Auto Basophil # : 0.1 K/uL  Auto Neutrophil % : 80.4 %  Auto Lymphocyte % : 9.5 %  Auto Monocyte % : 9.0 %  Auto Eosinophil % : 0.4 %  Auto Basophil % : 0.7 %    09-12    135  |  100  |  103<H>  ----------------------------<  135<H>  4.2   |  18<L>  |  9.63<H>    Ca    8.9      12 Sep 2017 04:47  Phos  8.8     09-12  Mg     2.8     09-12    TPro  7.4  /  Alb  2.4<L>  /  TBili  0.8  /  DBili  x   /  AST  24  /  ALT  13  /  AlkPhos  82  09-12    PT/INR - ( 11 Sep 2017 06:21 )   PT: 13.2 sec;   INR: 1.21 ratio         PTT - ( 11 Sep 2017 06:21 )  PTT:31.7 sec        [x ]GOALS OF CARE DISCUSSION WITH PATIENT/FAMILY/PROXY: Daughter Augusta    CRITICAL CARE TIME SPENT: 35 minutes

## 2017-09-12 NOTE — PROGRESS NOTE ADULT - SUBJECTIVE AND OBJECTIVE BOX
· Subjective and Objective: 	  INTERVAL HPI/ OVERNIGHT EVENTS: No major event overnight.       Antimicrobial:  cefTRIAXone   IVPB 1 Gram(s) IV Intermittent every 24 hours    Cardiovascular:  tamsulosin 0.4 milliGRAM(s) Oral at bedtime  NIFEdipine XL 60 milliGRAM(s) Oral daily  isosorbide   mononitrate ER Tablet (IMDUR) 30 milliGRAM(s) Oral daily  metoprolol 50 milliGRAM(s) Oral every 8 hours    Pulmonary:  buDESOnide  80 MICROgram(s)/formoterol 4.5 MICROgram(s) Inhaler 2 Puff(s) Inhalation two times a day    Hematalogic:  aspirin  chewable 81 milliGRAM(s) Oral daily  clopidogrel Tablet 75 milliGRAM(s) Oral daily  heparin  Injectable 5000 Unit(s) SubCutaneous every 12 hours    Other:  atorvastatin 80 milliGRAM(s) Oral at bedtime  insulin lispro (HumaLOG) corrective regimen sliding scale   SubCutaneous every 6 hours  pantoprazole  Injectable 40 milliGRAM(s) IV Push daily  sevelamer hydrochloride 1600 milliGRAM(s) Oral three times a day with meals    aspirin  chewable 81 milliGRAM(s) Oral daily  cefTRIAXone   IVPB 1 Gram(s) IV Intermittent every 24 hours  buDESOnide  80 MICROgram(s)/formoterol 4.5 MICROgram(s) Inhaler 2 Puff(s) Inhalation two times a day  tamsulosin 0.4 milliGRAM(s) Oral at bedtime  atorvastatin 80 milliGRAM(s) Oral at bedtime  insulin lispro (HumaLOG) corrective regimen sliding scale   SubCutaneous every 6 hours  clopidogrel Tablet 75 milliGRAM(s) Oral daily  heparin  Injectable 5000 Unit(s) SubCutaneous every 12 hours  pantoprazole  Injectable 40 milliGRAM(s) IV Push daily  NIFEdipine XL 60 milliGRAM(s) Oral daily  isosorbide   mononitrate ER Tablet (IMDUR) 30 milliGRAM(s) Oral daily  metoprolol 50 milliGRAM(s) Oral every 8 hours  sevelamer hydrochloride 1600 milliGRAM(s) Oral three times a day with meals    Drug Dosing Weight  Height (cm): 172.72 (06 Sep 2017 21:30)  Weight (kg): 88.7 (06 Sep 2017 21:30)  BMI (kg/m2): 29.7 (06 Sep 2017 21:30)  BSA (m2): 2.02 (06 Sep 2017 21:30)    CENTRAL LINE: [ ] YES [x ] NO  LOCATION:   DATE INSERTED:  REMOVE: [ ] YES [ ] NO  EXPLAIN:    JAMES: [x ] YES [ ] NO    DATE INSERTED:  REMOVE:  [ ] YES [x ] NO  EXPLAIN: I/O monitor    A-LINE:  [ ] YES [x ] NO  LOCATION:   DATE INSERTED:  REMOVE:  [ ] YES [ ] NO  EXPLAIN:    PMH -reviewed admission note, no change since admission      ICU Vital Signs Last 24 Hrs  T(C): 37.5 (12 Sep 2017 00:00), Max: 37.5 (11 Sep 2017 12:00)  T(F): 99.5 (12 Sep 2017 00:00), Max: 99.5 (11 Sep 2017 12:00)  HR: 97 (12 Sep 2017 06:00) (85 - 159)  BP: 134/56 (12 Sep 2017 06:00) (105/57 - 180/82)  BP(mean): 75 (12 Sep 2017 06:00) (68 - 106)  ABP: --  ABP(mean): --  RR: 22 (12 Sep 2017 06:00) (13 - 28)  SpO2: 95% (12 Sep 2017 06:00) (88% - 100%)      ABG - ( 11 Sep 2017 04:52 )  pH: 7.40  /  pCO2: 34    /  pO2: 92    / HCO3: 21    / Base Excess: -3.0  /  SaO2: 96                    09-11 @ 07:01  -  09-12 @ 07:00  --------------------------------------------------------  IN: 766.6 mL / OUT: 325 mL / NET: 441.6 mL        Mode: CPAP with PS  FiO2: 40  PEEP: 5  PS: 5  ITime: 1  MAP: 6  PIP: 11      PHYSICAL EXAM:    GENERAL: [x ]NAD, [x ]well-groomed, [x ]well-developed  HEAD:  [ x]Atraumatic, [ x]Normocephalic  EYES: [x ]EOMI, [x ]PERRLA, [ ]conjunctiva and sclera clear  ENMT: [x ]No tonsillar erythema, exudates, or enlargement; [ ]Moist mucous membranes, [ ]Good dentition, [ ]No lesions  NECK: [ x]Supple, normal appearance, [ ]No JVD; [ ]Normal thyroid; [ ]Trachea midline  NERVOUS SYSTEM:  [x ]Alert & Oriented X3, [x ]Good concentration; [ ]Motor Strength 5/5 B/L upper and lower extremities; [ ]DTRs 2+ intact and symmetric  CHEST/LUNG: [x ]No chest deformity; [ x) rales, rhonchi, and intubated  HEART: [x ]Regular rate and rhythm; [ ]No murmurs, rubs, or gallops  ABDOMEN: [x ]Soft, Nontender, Nondistended; [ ]Bowel sounds present  EXTREMITIES:  [ x]2+ Peripheral Pulses, [ ]No clubbing, cyanosis, or edema  LYMPH: [x ]No lymphadenopathy noted  SKIN: [ x]No rashes or lesions; [ ]Good capillary refill      LABS:  CBC Full  -  ( 12 Sep 2017 04:47 )  WBC Count : 10.1 K/uL  Hemoglobin : 11.2 g/dL  Hematocrit : 33.6 %  Platelet Count - Automated : 248 K/uL  Mean Cell Volume : 85.3 fl  Mean Cell Hemoglobin : 28.4 pg  Mean Cell Hemoglobin Concentration : 33.3 gm/dL  Auto Neutrophil # : 8.1 K/uL  Auto Lymphocyte # : 1.0 K/uL  Auto Monocyte # : 0.9 K/uL  Auto Eosinophil # : 0.0 K/uL  Auto Basophil # : 0.1 K/uL  Auto Neutrophil % : 80.4 %  Auto Lymphocyte % : 9.5 %  Auto Monocyte % : 9.0 %  Auto Eosinophil % : 0.4 %  Auto Basophil % : 0.7 %    09-12    135  |  100  |  103<H>  ----------------------------<  135<H>  4.2   |  18<L>  |  9.63<H>    Ca    8.9      12 Sep 2017 04:47  Phos  8.8     09-12  Mg     2.8     09-12    TPro  7.4  /  Alb  2.4<L>  /  TBili  0.8  /  DBili  x   /  AST  24  /  ALT  13  /  AlkPhos  82  09-12    PT/INR - ( 11 Sep 2017 06:21 )   PT: 13.2 sec;   INR: 1.21 ratio         PTT - ( 11 Sep 2017 06:21 )  PTT:31.7 sec        [x ]GOALS OF CARE DISCUSSION WITH PATIENT/FAMILY/PROXY: Daughter Augusta    CRITICAL CARE TIME SPENT: 35 minutes    Assessment and Plan:   · Assessment		  71yo male, [place]ian speaking, wife is current patient in ICU, daughter at bedside to [facility], [facility] CAD s/p 12-14 stents (placed [id number]), DM, HTN, HLD, CVA (4 yrs ago, residual left sided facial weakness), lumbar radiculopathy with herniated disc, COPD (never smoker, passive smoke exposure), CKD (last creatinine 2 months ago was 1.2), bipolar disorder (no meds, sees psychiatrist), chronic kidney stones (has had ureteral stent in past) p/w 2 days intractable right flank pain radiating to groin, chills, vomiting 2/2 kidney stones/UTI, rule out pyelonephritis/obstruction.  Patient with NSTEMI with unstable angina and TWI lateral leads     Problem/Plan - 1:  ·  Problem: Respiratory distress.  Plan: Hypoxic respiratory distress from Pul edema from CHF from acute NSTEMI  Patient got extubated yesterday and Lasix drip was stopped since pt not making urine at all.   -Will dialysis him today for fluid overload.      Problem/Plan - 2:  ·  Problem: NSTEMI (non-ST elevated myocardial infarction).  Plan: patient presented with unstable angina in ED  TWI precordial and lateral leads, elevated troponin  currently, with minimal chest pain after ASA, Plavix loading,   s/p NTG, heparin gtt  - trend troponin: last troponin 1.8->1.5.   - TTE with 35% EF and G2DD(new onset)  - ASA, statin, BB, heparin gtt  - cardiology consult -Dr Briseno  -Transfer to [place] today fro cardiac cath.      Problem/Plan - 3:  ·  Problem: Hypertensive emergency.  Plan: SBP in 140/150, s/p labetalol  - s/p NGT drip  -on Imdur and nifedipine, lopressor  -not on ACEI/ARB as acute renal failure.      Problem/Plan - 4:  ·  Problem: LIN (acute kidney injury).  Plan: LIN on CKD, last creatinine increasing to 8.23 with BUN 90.   Patient refused james, dialysis and IR guided nephrostomy in the beginning of the hospital stay.   CT abdomen/pelvis: right hydronephrosis, right ureteral stone; bilateral renal calculi, left renal atrophy.  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    - urology consult tonight - Dr Irvin consulted  -Patient agreed to HD and james after repeated family meeting and had dialysed 2 times..  Today is the last HD.      Problem/Plan - 5:  ·  Problem: Pyelonephritis.  Plan: fever, chills, vomiting, perinephric stranding on CT scan, + R CVAT  UA positive nitrite/LE/WBC  - ceftriaxone day 6  - follow up UCx and Bcx- negative up to date.      Problem/Plan - 6:  Problem: Type 2 diabetes mellitus with diabetic chronic kidney disease, unspecified CKD stage, unspecified long term insulin use status. Plan: BSL controlled  - hold oral hypoglycemics  - diabetic diet, HSS,     Problem/Plan - 7:  ·  Problem: Need for prophylactic measure.  Plan: improve score = 2, on heparin Q8  GI ppx.

## 2017-09-12 NOTE — PROGRESS NOTE ADULT - PROBLEM SELECTOR PLAN 2
patient presented with unstable angina in ED  TWI precordial and lateral leads, elevated troponin  currently, with minimal chest pain after ASA, Plavix loading,   s/p NTG, heparin gtt  - trend troponin: last troponin 1.8->1.5.   - TTE with 35% EF and G2DD(new onset)  - ASA, statin, BB, heparin gtt  - cardiology consult -Dr Briseno  -Transfer to [place] today fro cardiac cath.

## 2017-09-12 NOTE — DISCHARGE NOTE ADULT - CARE PROVIDER_API CALL
Raji Hinojosa), Cardiology Medicine  [address] [place] [address]  Phone: [phone]  Fax: [phone]

## 2017-09-12 NOTE — PROGRESS NOTE ADULT - PROBLEM SELECTOR PLAN 4
LIN on CKD, last creatinine increasing to 8.23 with BUN 90.   Patient refused james, dialysis and IR guided nephrostomy in the beginning of the hospital stay.   CT abdomen/pelvis: right hydronephrosis, right ureteral stone; bilateral renal calculi, left renal atrophy.  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    - urology consult tonight - Dr Irvin consulted  -Patient agreed to HD and james after repeated family meeting and had dialysed 2 times..  Today is the last HD.

## 2017-09-12 NOTE — PROGRESS NOTE ADULT - ATTENDING COMMENTS
72 male with hx of CAD s/p prior PCI, CKD, HTN, nephrolithiasis, ureteral stent, CVA, COPD, presented to with chest and flank pain, found to have NSTEMI, CHRrEF, mild AS, kidney stones/ hydronephrosis, LIN eventually requiring dialysis, subsequently developing acute resp failure requiring intubation, extubated 9/11.  Patient initially refusing james, ureteral stent, or cardiac cath, now agreeable.  Possible transfer to [facility] for cardiac cath today.  Total CC time spent 35 min.

## 2017-09-12 NOTE — H&P CARDIOLOGY - HISTORY OF PRESENT ILLNESS
This is 73 yo male  who is Bangladeshi speaking male with PMH CAD s/p 12-14  stents placed [id number], T2 DM. HTN HLD, CVA 4 years ago with residual left sided facial weaknes, radiculopathy with herniated disc, COPD, CKD creatine 2 months ago 1.2, bipolar  ( no meds followed by psychiatry) , chronic kidney stones and has had urethral tents in the past who presented to [facility] hospital  with 2 days intractable right flank pain radiating  to the groin with  with chills.. Patient was admitted  with pyelonephritis started on ceftriaxone. 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 Patient is now s/p james making no urine, dialysis today via femoral catheter for creatine of . Patient treated in the ICU for  NSTEMI with TWI precordial and lateral leads  with increased troponin I  0.120>0.193 . Patient was also started on Heparin gtt, loaded with ASa and plavix, TTE with EF 35 %. He was intubated with hypoxic respiratory distress from pulm edema from the acute NSTEMI This is 71 yo male  who is Namibian speaking male with PMH CAD s/p 12-14  stents placed [id number], T2 DM. HTN HLD, CVA 4 years ago with residual left sided facial weaknes, radiculopathy with herniated disc, COPD, CKD creatine 2 months ago 1.2 ( nephrologist is Dr berg [phone]) , bipolar  ( no meds followed by psychiatry) , chronic kidney stones and has had urethral tents in the past who presented to [facility] hospital  with 2 days intractable right flank pain radiating  to the groin with  with chills.. Patient was admitted  with pyelonephritis started on ceftriaxone. 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 Patient is now s/p james making no urine, dialysis today via femoral catheter for creatine of . Patient treated in the ICU for  NSTEMI with TWI precordial and lateral leads  with increased troponin I  0.120>0.193 . Patient was also started on Heparin gtt, loaded with ASa and plavix, TTE with EF 35 %. He was intubated with hypoxic respiratory distress from pulm edema from the acute NSTEMI as well as treated for  Hypertensive emergency with NTG gtt.  He is now transfered to [facility]  Hospital  for cardiac angiogram This is 71 yo male  who is Kyrgyz speaking male with PMH CAD s/p 12-14  stents placed [id number], T2 DM. HTN HLD, CVA 4 years ago with residual left sided facial weaknes, radiculopathy with herniated disc, COPD, CKD creatine 2 months ago 1.2 ( nephrologist is Dr berg [phone]) , bipolar  ( no meds followed by psychiatry) , chronic kidney stones and has had urethral tents in the past who presented to [facility] hospital  with 2 days intractable right flank pain radiating  to the groin with  with chills.. Patient was admitted  with pyelonephritis started on ceftriaxone. 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 Patient is now s/p james making no urine, 2nd dialysis today via femoral catheter for creatine of . Patient treated in the ICU for  NSTEMI with TWI precordial and lateral leads  with increased troponin I  0.120>0.193 . Patient was also started on Heparin gtt, loaded with ASa and plavix, TTE with EF 35 %. He was intubated with hypoxic respiratory distress from pulm edema from the acute NSTEMI as well as treated for  Hypertensive emergency with NTG gtt.  He is now transfered to [facility]  Hospital  for cardiac angiogram    Hx was obtained from chart and daughter Anisa Lara  [phone] and entered post procedure This is 71 yo male  who is Salvadorean speaking male with PMH CAD s/p 12-14  stents placed [phone], T2 DM. HTN HLD, CVA 4 years ago with residual left sided facial weaknes, radiculopathy with herniated disc, COPD, CKD creatine 2 months ago 1.2 ( nephrologist is Dr berg [phone]) , bipolar  ( no meds followed by psychiatry) , chronic kidney stones and has had urethral tents in the past who presented to [facility] hospital  with 2 days intractable right flank pain radiating  to the groin with  with chills.. Patient was admitted  with pyelonephritis started on ceftriaxone. 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 Patient is now s/p james making no urine, 2nd dialysis today via femoral catheter for creatine of . Patient treated in the ICU for  NSTEMI with TWI precordial and lateral leads  with increased troponin I  0.120>0.193 . Patient was also started on Heparin gtt, loaded with ASa and plavix, TTE with EF 35 %. He was intubated with hypoxic respiratory distress from pulm edema from the acute NSTEMI as well as treated for  Hypertensive emergency with NTG gtt.  He is now transfered to [facility]  Hospital  for cardiac angiogram    Hx was obtained from chart and daughter Anisa Lara  [phone] and entered post procedure   renal is dr charles mosquera [phone]  cardiology dr lord

## 2017-09-12 NOTE — H&P CARDIOLOGY - PMH
Bipolar affective disorder    BPH (benign prostatic hyperplasia)    CKD (chronic kidney disease)    COPD (chronic obstructive pulmonary disease)    Coronary artery disease    CVA (cerebral vascular accident)    Diabetes mellitus    Dyslipidemia    Hypertension    Pancreatitis

## 2017-09-12 NOTE — PROGRESS NOTE ADULT - ASSESSMENT
71yo male, Algerian speaking, wife is current patient in ICU, daughter at bedside to [facility] CAD s/p 12-14 stents (placed [id number]), DM, HTN, HLD, CVA (4 yrs ago, residual left sided facial weakness), lumbar radiculopathy with herniated disc, COPD (never smoker, passive smoke exposure), CKD (last creatinine 2 months ago was 1.2), bipolar disorder (no meds, sees psychiatrist), chronic kidney stones (has had ureteral stent in past) p/w 2 days intractable right flank pain radiating to groin, chills, vomiting 2/2 kidney stones/UTI, rule out pyelonephritis/obstruction.  Patient with NSTEMI with unstable angina and TWI lateral leads

## 2017-09-12 NOTE — DISCHARGE NOTE ADULT - PLAN OF CARE
s/p 2x hemodialysis. If renal function does not improve, patient will need new HD set up. Plan to HD after cardiac cath. Continue Rocephin as blood and urine culture negative. But UA positive. Partial obstruction with mild hydronephrosis on right side. Possible candidate for stent or nephrostomy after cardiac cath and stabilized. to get cardiac cath and proceed accordingly. Continue aspirin, statin and Plavix. Transfer to [place] for cardiac cath and get hemodialysis after. Proceed as per cardio. Continue aspirin, statin and Plavix. Transfer to [place] for cardiac cath and get hemodialysis after. Proceed as per cardio. Optimize BP control. Continue anti-HTN medication as prescribed and adjust as necessary.

## 2017-09-12 NOTE — DISCHARGE NOTE ADULT - HOSPITAL COURSE
71yo male, Turkish speaking, wife is current patient in ICU, daughter at bedside to [facility] CAD s/p 12-14 stents (placed [phone]), DM, HTN, HLD, CVA (4 yrs ago, residual left sided facial weakness), lumbar radiculopathy with herniated disc, COPD (never smoker, passive smoke exposure), CKD (last creatinine 2 months ago was 1.2), bipolar disorder (no meds, sees psychiatrist), chronic kidney stones (has had ureteral stent in past) p/w 2 days intractable right flank pain radiating to groin, chills, vomiting 2/2 kidney stones/UTI, rule out pyelonephritis/obstruction.  Patient with NSTEMI with unstable angina and TWI lateral leads     Problem/Plan - 1:  ·  Problem: Respiratory distress.  Plan: Hypoxic respiratory distress from Pul edema from CHF from acute NSTEMI  Patient got extubated yesterday and Lasix drip was stopped since pt not making urine at all.   -Will dialysis him today for fluid overload.      Problem/Plan - 2:  ·  Problem: NSTEMI (non-ST elevated myocardial infarction).  Plan: patient presented with unstable angina in ED  TWI precordial and lateral leads, elevated troponin  currently, with minimal chest pain after ASA, Plavix loading,   s/p NTG, heparin gtt  - trend troponin: last troponin 1.8->1.5.   - TTE with 35% EF and G2DD(new onset)  - ASA, statin, BB, heparin gtt  - cardiology consult -Dr Briseno  -Transfer to [place] today fro cardiac cath.      Problem/Plan - 3:  ·  Problem: Hypertensive emergency.  Plan: SBP in 140/150, s/p labetalol  - s/p NGT drip  -on Imdur and nifedipine, lopressor  -not on ACEI/ARB as acute renal failure.      Problem/Plan - 4:  ·  Problem: LIN (acute kidney injury).  Plan: LIN on CKD, last creatinine increasing to 8.23 with BUN 90.   Patient refused james, dialysis and IR guided nephrostomy in the beginning of the hospital stay.   CT abdomen/pelvis: right hydronephrosis, right ureteral stone; bilateral renal calculi, left renal atrophy.  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    - urology consult tonight - Dr Irvin consulted  -Patient agreed to HD and james after repeated family meeting and had dialysed 2 times..  Today is the last HD.      Problem/Plan - 5:  ·  Problem: Pyelonephritis.  Plan: fever, chills, vomiting, perinephric stranding on CT scan, + R CVAT  UA positive nitrite/LE/WBC  - ceftriaxone day 6  - follow up UCx and Bcx- negative up to date.      Problem/Plan - 6:  Problem: Type 2 diabetes mellitus with diabetic chronic kidney disease, unspecified CKD stage, unspecified long term insulin use status. Plan: BSL controlled  - hold oral hypoglycemics  - diabetic diet, HSS,     Problem/Plan - 7:  ·  Problem: Need for prophylactic measure.  Plan: improve score = 2, on heparin Q8  GI ppx.       Discussed with ICU attending and patient is medically stable to transfer to [place] for cardiac cath.

## 2017-09-13 LAB
ANION GAP SERPL CALC-SCNC: 28 MMOL/L — HIGH (ref 5–17)
APTT BLD: 71.8 SEC — HIGH (ref 27.5–37.4)
APTT BLD: 77.5 SEC — HIGH (ref 27.5–37.4)
BUN SERPL-MCNC: 111 MG/DL — HIGH (ref 7–23)
CALCIUM SERPL-MCNC: 9.5 MG/DL — SIGNIFICANT CHANGE UP (ref 8.4–10.5)
CHLORIDE SERPL-SCNC: 91 MMOL/L — LOW (ref 96–108)
CO2 SERPL-SCNC: 17 MMOL/L — LOW (ref 22–31)
CREAT SERPL-MCNC: 9.79 MG/DL — HIGH (ref 0.5–1.3)
CULTURE RESULTS: SIGNIFICANT CHANGE UP
GLUCOSE SERPL-MCNC: 145 MG/DL — HIGH (ref 70–99)
HBA1C BLD-MCNC: 6.5 % — HIGH (ref 4–5.6)
HCT VFR BLD CALC: 34.8 % — LOW (ref 39–50)
HGB BLD-MCNC: 11.3 G/DL — LOW (ref 13–17)
MCHC RBC-ENTMCNC: 28.1 PG — SIGNIFICANT CHANGE UP (ref 27–34)
MCHC RBC-ENTMCNC: 32.6 GM/DL — SIGNIFICANT CHANGE UP (ref 32–36)
MCV RBC AUTO: 86.1 FL — SIGNIFICANT CHANGE UP (ref 80–100)
PLATELET # BLD AUTO: 292 K/UL — SIGNIFICANT CHANGE UP (ref 150–400)
POTASSIUM SERPL-MCNC: 4.8 MMOL/L — SIGNIFICANT CHANGE UP (ref 3.5–5.3)
POTASSIUM SERPL-SCNC: 4.8 MMOL/L — SIGNIFICANT CHANGE UP (ref 3.5–5.3)
RBC # BLD: 4.04 M/UL — LOW (ref 4.2–5.8)
RBC # FLD: 12.3 % — SIGNIFICANT CHANGE UP (ref 10.3–14.5)
SODIUM SERPL-SCNC: 136 MMOL/L — SIGNIFICANT CHANGE UP (ref 135–145)
SPECIMEN SOURCE: SIGNIFICANT CHANGE UP
WBC # BLD: 10.9 K/UL — HIGH (ref 3.8–10.5)
WBC # FLD AUTO: 10.9 K/UL — HIGH (ref 3.8–10.5)

## 2017-09-13 PROCEDURE — 93306 TTE W/DOPPLER COMPLETE: CPT | Mod: 26

## 2017-09-13 PROCEDURE — 93010 ELECTROCARDIOGRAM REPORT: CPT

## 2017-09-13 RX ADMIN — SEVELAMER CARBONATE 800 MILLIGRAM(S): 2400 POWDER, FOR SUSPENSION ORAL at 11:30

## 2017-09-13 RX ADMIN — RANOLAZINE 1000 MILLIGRAM(S): 500 TABLET, FILM COATED, EXTENDED RELEASE ORAL at 05:24

## 2017-09-13 RX ADMIN — SEVELAMER CARBONATE 800 MILLIGRAM(S): 2400 POWDER, FOR SUSPENSION ORAL at 08:18

## 2017-09-13 RX ADMIN — Medication 81 MILLIGRAM(S): at 05:24

## 2017-09-13 RX ADMIN — CEFTRIAXONE 100 GRAM(S): 500 INJECTION, POWDER, FOR SOLUTION INTRAMUSCULAR; INTRAVENOUS at 05:23

## 2017-09-13 RX ADMIN — Medication 5 MILLIGRAM(S): at 18:21

## 2017-09-13 RX ADMIN — Medication 50 MILLIGRAM(S): at 05:24

## 2017-09-13 RX ADMIN — HEPARIN SODIUM 900 UNIT(S)/HR: 5000 INJECTION INTRAVENOUS; SUBCUTANEOUS at 09:01

## 2017-09-13 RX ADMIN — CLOPIDOGREL BISULFATE 75 MILLIGRAM(S): 75 TABLET, FILM COATED ORAL at 05:24

## 2017-09-13 RX ADMIN — Medication 1: at 11:28

## 2017-09-13 RX ADMIN — Medication 1: at 21:35

## 2017-09-13 RX ADMIN — RANOLAZINE 1000 MILLIGRAM(S): 500 TABLET, FILM COATED, EXTENDED RELEASE ORAL at 18:21

## 2017-09-13 RX ADMIN — HEPARIN SODIUM 1000 UNIT(S)/HR: 5000 INJECTION INTRAVENOUS; SUBCUTANEOUS at 01:31

## 2017-09-13 RX ADMIN — Medication 5 MILLIGRAM(S): at 05:24

## 2017-09-13 RX ADMIN — Medication 50 MILLIGRAM(S): at 16:46

## 2017-09-13 RX ADMIN — ATORVASTATIN CALCIUM 40 MILLIGRAM(S): 80 TABLET, FILM COATED ORAL at 21:33

## 2017-09-13 RX ADMIN — SEVELAMER CARBONATE 800 MILLIGRAM(S): 2400 POWDER, FOR SUSPENSION ORAL at 18:20

## 2017-09-13 RX ADMIN — Medication 60 MILLIGRAM(S): at 05:24

## 2017-09-13 RX ADMIN — TAMSULOSIN HYDROCHLORIDE 0.4 MILLIGRAM(S): 0.4 CAPSULE ORAL at 21:33

## 2017-09-13 RX ADMIN — ISOSORBIDE MONONITRATE 30 MILLIGRAM(S): 60 TABLET, EXTENDED RELEASE ORAL at 11:28

## 2017-09-13 RX ADMIN — BUDESONIDE AND FORMOTEROL FUMARATE DIHYDRATE 2 PUFF(S): 160; 4.5 AEROSOL RESPIRATORY (INHALATION) at 18:21

## 2017-09-13 RX ADMIN — CEFTRIAXONE 100 GRAM(S): 500 INJECTION, POWDER, FOR SOLUTION INTRAMUSCULAR; INTRAVENOUS at 21:42

## 2017-09-13 RX ADMIN — Medication 50 MILLIGRAM(S): at 21:33

## 2017-09-13 RX ADMIN — HEPARIN SODIUM 900 UNIT(S)/HR: 5000 INJECTION INTRAVENOUS; SUBCUTANEOUS at 16:46

## 2017-09-13 RX ADMIN — BUDESONIDE AND FORMOTEROL FUMARATE DIHYDRATE 2 PUFF(S): 160; 4.5 AEROSOL RESPIRATORY (INHALATION) at 05:24

## 2017-09-13 NOTE — CONSULT NOTE ADULT - SUBJECTIVE AND OBJECTIVE BOX
Pt is 73 yo male Egyptian speaking with PMH CAD s/p stents, T2 DM, HTN HLD, CVA 4 years ago with residual left sided facial weaknes, radiculopathy with herniated disc, COPD, CKD creatine 2 months ago 1.2 ( nephrologist is Dr Devlin [phone]) , bipolar  ( no meds followed by psychiatry) , kidney stones, had ureteral stents in the past who presented to [facility] with 2 days intractable right flank pain radiating  to the groin with chills. Patient was admitted  with pyelonephritis, started on Ceftriaxone.  On CT noted to have L kidney atrophy and R hydro due to stone, noted to have obstructive LIN. Patient is s/p james with minimal UO, started on HD in [facility]. Per chart declined  intervention. Patient was also noted to have NSTEMI. He is now transfered to [facility]  for cardiac angiogram. Due for HD today. Awake, alert, poor historian, this am denies CP, SOB, N/V, D/C, F/C, pain.    Past Medical History:  Bipolar affective disorder    BPH (benign prostatic hyperplasia)    CKD (chronic kidney disease)    COPD (chronic obstructive pulmonary disease)    Coronary artery disease    CVA (cerebral vascular accident)    Diabetes mellitus    Dyslipidemia    Hypertension    Pancreatitis.      Social History: negative    FH: [place]    Review of Systems: as above    All: NKDA    Meds:  insulin lispro (HumaLOG) corrective regimen sliding scale   SubCutaneous three times a day before meals  insulin lispro (HumaLOG) corrective regimen sliding scale   SubCutaneous at bedtime  dextrose 5%. 1000 milliLiter(s) IV Continuous <Continuous>  dextrose Gel 1 Dose(s) Oral once PRN  dextrose 50% Injectable 12.5 Gram(s) IV Push once  dextrose 50% Injectable 25 Gram(s) IV Push once  dextrose 50% Injectable 25 Gram(s) IV Push once  glucagon  Injectable 1 milliGRAM(s) IntraMuscular once PRN  aspirin enteric coated 81 milliGRAM(s) Oral daily  tamsulosin 0.4 milliGRAM(s) Oral at bedtime  isosorbide   mononitrate ER Tablet (IMDUR) 30 milliGRAM(s) Oral daily  ranolazine 1000 milliGRAM(s) Oral two times a day  atorvastatin 40 milliGRAM(s) Oral at bedtime  clopidogrel Tablet 75 milliGRAM(s) Oral daily  heparin  Infusion.  Unit(s)/Hr IV Continuous <Continuous>  heparin  Injectable 5000 Unit(s) IV Push every 6 hours PRN  cefTRIAXone   IVPB 1 Gram(s) IV Intermittent every 24 hours  metoprolol 50 milliGRAM(s) Oral every 8 hours  NIFEdipine XL 60 milliGRAM(s) Oral daily  buDESOnide  80 MICROgram(s)/formoterol 4.5 MICROgram(s) Inhaler 2 Puff(s) Inhalation two times a day  ALBUTerol    90 MICROgram(s) HFA Inhaler 2 Puff(s) Inhalation every 6 hours PRN  sevelamer hydrochloride 800 milliGRAM(s) Oral three times a day with meals  busPIRone 5 milliGRAM(s) Oral two times a day  cefTRIAXone   IVPB        Vital Signs Last 24 Hrs  T(C): 36.6 (09-13-17 @ 13:00), Max: 37.1 (09-13-17 @ 12:06)  T(F): 97.8 (09-13-17 @ 13:00), Max: 98.8 (09-13-17 @ 12:06)  HR: 80 (09-13-17 @ 13:00) (80 - 97)  BP: 131/68 (09-13-17 @ 13:00) (126/74 - 147/81)  BP(mean): 101 (09-12-17 @ 19:23) (101 - 101)  RR: 17 (09-13-17 @ 13:00) (16 - 18)  SpO2: 95% (09-13-17 @ 13:00) (93% - 96%)     Physical Exam:  · Constitutional	Well-developed, well nourished	  · Neck	supple	  		  · Respiratory Details	decr BS at bases b/l	  · Cardiovascular	Regular rate & rhythm, normal S1, S2; no murmurs, gallops or rubs	  · Gastrointestinal	Soft, non-tender, ND, normal bowel sounds	  · Extremities	no edema	  · Extremities Comments	Right femoral dialysis catheter in place	  		  		  		                            11.3   10.9  )-----------( 292      ( 13 Sep 2017 07:46 )             34.8     13 Sep 2017 07:46    136    |  91     |  111    ----------------------------<  145    4.8     |  17     |  9.79     Ca    9.5        13 Sep 2017 07:46       Assessment /  Plan:    S/p cath 9/12  Obstructive LIN on CKD started on HD in [facility]  HD #3 today via femoral cath  TMP as able  2K bath  Heparin free  For PCI 9/14  HD again post PCI 9/14 pm or 9/15 am  Pt w/LIN on CKD in setting of L kidney atrophy and obstructed R kidney  Will get renal SONO to eval for R hydro   eval  BMP daily  No nephrotoxins

## 2017-09-13 NOTE — PROGRESS NOTE ADULT - SUBJECTIVE AND OBJECTIVE BOX
Patient is a 72y old  Male who presents with CAD 12 Sep 2017 19:27). S/p Cardiac cath - diagnostic LAD 75%, Cx moderate disease, RCA (80%).           Allergies    No Known Allergies    Intolerances        Medications:  insulin lispro (HumaLOG) corrective regimen sliding scale   SubCutaneous three times a day before meals  insulin lispro (HumaLOG) corrective regimen sliding scale   SubCutaneous at bedtime  dextrose 5%. 1000 milliLiter(s) IV Continuous <Continuous>  dextrose Gel 1 Dose(s) Oral once PRN  dextrose 50% Injectable 12.5 Gram(s) IV Push once  dextrose 50% Injectable 25 Gram(s) IV Push once  dextrose 50% Injectable 25 Gram(s) IV Push once  glucagon  Injectable 1 milliGRAM(s) IntraMuscular once PRN  aspirin enteric coated 81 milliGRAM(s) Oral daily  tamsulosin 0.4 milliGRAM(s) Oral at bedtime  isosorbide   mononitrate ER Tablet (IMDUR) 30 milliGRAM(s) Oral daily  ranolazine 1000 milliGRAM(s) Oral two times a day  atorvastatin 40 milliGRAM(s) Oral at bedtime  clopidogrel Tablet 75 milliGRAM(s) Oral daily  heparin  Infusion.  Unit(s)/Hr IV Continuous <Continuous>  heparin  Injectable 5000 Unit(s) IV Push every 6 hours PRN  cefTRIAXone   IVPB 1 Gram(s) IV Intermittent every 24 hours  metoprolol 50 milliGRAM(s) Oral every 8 hours  NIFEdipine XL 60 milliGRAM(s) Oral daily  buDESOnide  80 MICROgram(s)/formoterol 4.5 MICROgram(s) Inhaler 2 Puff(s) Inhalation two times a day  ALBUTerol    90 MICROgram(s) HFA Inhaler 2 Puff(s) Inhalation every 6 hours PRN  sevelamer hydrochloride 800 milliGRAM(s) Oral three times a day with meals  busPIRone 5 milliGRAM(s) Oral two times a day  cefTRIAXone   IVPB          Vitals:  T(C): 36.7 (17 @ 05:21), Max: 36.9 (17 @ 19:40)  HR: 91 (17 @ 05:21) (86 - 97)  BP: 128/94 (17 @ 05:21) (126/74 - 147/81)  BP(mean): 101 (17 @ 19:23) (101 - 101)  RR: 17 (17 @ 05:21) (16 - 17)  SpO2: 94% (17 @ 05:21) (93% - 96%)  Wt(kg): --  Daily Height in cm: 172.72 (12 Sep 2017 19:40)    Daily Weight in k.6 (12 Sep 2017 19:23)  I&O's Summary        Physical Exam:  Appearance: Normal  HENT: Normal oral mus[place]a, NC/AT  Cardiovascular: S1S2, RRR, No M/R/G, no JVD, No Lower extremity edema  Procedural Access Site: Right radial  No hematoma, Non-tender to palpation, 2+ pulse, No bruit, No Ecchymosis  Respiratory: Clear to auscultation bilaterally  Gastrointestinal: Soft, Non tender, Normal Bowel Sounds  Musculoskeletal: No clubbing, No joint deformity   Neurologic: Non-focal  Psychiatry: AAOx3, Mood & affect appropriate  Skin: No rashes, No ecchymoses, No cyanosis      PTT - ( 12 Sep 2017 22:51 )  PTT:43.4 sec    ECG:    Interpretation of Telemetry:    Assessment/Plan  Now s/p diagnostic cardiac cath LAD (75%), Cx moderate disease. RCA (80%) via right radial . Pt tolerated the procedure well. cardiac cath site benign. Plan for HD and TTE  today and PCI with impella assisted pending ? Thus

## 2017-09-14 DIAGNOSIS — J44.9 CHRONIC OBSTRUCTIVE PULMONARY DISEASE, UNSPECIFIED: ICD-10-CM

## 2017-09-14 DIAGNOSIS — I10 ESSENTIAL (PRIMARY) HYPERTENSION: ICD-10-CM

## 2017-09-14 DIAGNOSIS — I50.21 ACUTE SYSTOLIC (CONGESTIVE) HEART FAILURE: ICD-10-CM

## 2017-09-14 DIAGNOSIS — N17.9 ACUTE KIDNEY FAILURE, UNSPECIFIED: ICD-10-CM

## 2017-09-14 DIAGNOSIS — E78.5 HYPERLIPIDEMIA, UNSPECIFIED: ICD-10-CM

## 2017-09-14 DIAGNOSIS — E11.9 TYPE 2 DIABETES MELLITUS WITHOUT COMPLICATIONS: ICD-10-CM

## 2017-09-14 DIAGNOSIS — N12 TUBULO-INTERSTITIAL NEPHRITIS, NOT SPECIFIED AS ACUTE OR CHRONIC: ICD-10-CM

## 2017-09-14 DIAGNOSIS — Z29.9 ENCOUNTER FOR PROPHYLACTIC MEASURES, UNSPECIFIED: ICD-10-CM

## 2017-09-14 DIAGNOSIS — I25.118 ATHEROSCLEROTIC HEART DISEASE OF NATIVE CORONARY ARTERY WITH OTHER FORMS OF ANGINA PECTORIS: ICD-10-CM

## 2017-09-14 LAB
ANION GAP SERPL CALC-SCNC: 28 MMOL/L — HIGH (ref 5–17)
APTT BLD: 90.9 SEC — HIGH (ref 27.5–37.4)
BUN SERPL-MCNC: 95 MG/DL — HIGH (ref 7–23)
CALCIUM SERPL-MCNC: 9.5 MG/DL — SIGNIFICANT CHANGE UP (ref 8.4–10.5)
CHLORIDE SERPL-SCNC: 92 MMOL/L — LOW (ref 96–108)
CO2 SERPL-SCNC: 17 MMOL/L — LOW (ref 22–31)
CREAT SERPL-MCNC: 8.84 MG/DL — HIGH (ref 0.5–1.3)
GLUCOSE SERPL-MCNC: 137 MG/DL — HIGH (ref 70–99)
HCT VFR BLD CALC: 33.8 % — LOW (ref 39–50)
HGB BLD-MCNC: 11.4 G/DL — LOW (ref 13–17)
INR BLD: 1.34 RATIO — HIGH (ref 0.88–1.16)
MCHC RBC-ENTMCNC: 28.8 PG — SIGNIFICANT CHANGE UP (ref 27–34)
MCHC RBC-ENTMCNC: 33.7 GM/DL — SIGNIFICANT CHANGE UP (ref 32–36)
MCV RBC AUTO: 85.4 FL — SIGNIFICANT CHANGE UP (ref 80–100)
PLATELET # BLD AUTO: 285 K/UL — SIGNIFICANT CHANGE UP (ref 150–400)
POTASSIUM SERPL-MCNC: 5.3 MMOL/L — SIGNIFICANT CHANGE UP (ref 3.5–5.3)
POTASSIUM SERPL-SCNC: 5.3 MMOL/L — SIGNIFICANT CHANGE UP (ref 3.5–5.3)
PROTHROM AB SERPL-ACNC: 14.7 SEC — HIGH (ref 9.8–12.7)
RBC # BLD: 3.95 M/UL — LOW (ref 4.2–5.8)
RBC # FLD: 12.3 % — SIGNIFICANT CHANGE UP (ref 10.3–14.5)
SODIUM SERPL-SCNC: 137 MMOL/L — SIGNIFICANT CHANGE UP (ref 135–145)
WBC # BLD: 12.3 K/UL — HIGH (ref 3.8–10.5)
WBC # FLD AUTO: 12.3 K/UL — HIGH (ref 3.8–10.5)

## 2017-09-14 PROCEDURE — 99233 SBSQ HOSP IP/OBS HIGH 50: CPT | Mod: GC

## 2017-09-14 PROCEDURE — 93010 ELECTROCARDIOGRAM REPORT: CPT

## 2017-09-14 PROCEDURE — 76775 US EXAM ABDO BACK WALL LIM: CPT | Mod: 26

## 2017-09-14 RX ORDER — NITROGLYCERIN 6.5 MG
0.5 CAPSULE, EXTENDED RELEASE ORAL ONCE
Qty: 0 | Refills: 0 | Status: COMPLETED | OUTPATIENT
Start: 2017-09-14 | End: 2017-09-14

## 2017-09-14 RX ORDER — ACETYLCYSTEINE 200 MG/ML
600 VIAL (ML) MISCELLANEOUS
Qty: 0 | Refills: 0 | Status: COMPLETED | OUTPATIENT
Start: 2017-09-14 | End: 2017-09-16

## 2017-09-14 RX ORDER — FENTANYL CITRATE 50 UG/ML
25 INJECTION INTRAVENOUS ONCE
Qty: 0 | Refills: 0 | Status: DISCONTINUED | OUTPATIENT
Start: 2017-09-14 | End: 2017-09-14

## 2017-09-14 RX ADMIN — Medication 1: at 23:08

## 2017-09-14 RX ADMIN — Medication 5 MILLIGRAM(S): at 23:30

## 2017-09-14 RX ADMIN — FENTANYL CITRATE 25 MICROGRAM(S): 50 INJECTION INTRAVENOUS at 16:52

## 2017-09-14 RX ADMIN — Medication 50 MILLIGRAM(S): at 23:10

## 2017-09-14 RX ADMIN — Medication 50 MILLIGRAM(S): at 05:41

## 2017-09-14 RX ADMIN — BUDESONIDE AND FORMOTEROL FUMARATE DIHYDRATE 2 PUFF(S): 160; 4.5 AEROSOL RESPIRATORY (INHALATION) at 23:32

## 2017-09-14 RX ADMIN — CEFTRIAXONE 100 GRAM(S): 500 INJECTION, POWDER, FOR SOLUTION INTRAMUSCULAR; INTRAVENOUS at 23:11

## 2017-09-14 RX ADMIN — RANOLAZINE 1000 MILLIGRAM(S): 500 TABLET, FILM COATED, EXTENDED RELEASE ORAL at 23:30

## 2017-09-14 RX ADMIN — HEPARIN SODIUM 0 UNIT(S)/HR: 5000 INJECTION INTRAVENOUS; SUBCUTANEOUS at 05:46

## 2017-09-14 RX ADMIN — ATORVASTATIN CALCIUM 40 MILLIGRAM(S): 80 TABLET, FILM COATED ORAL at 23:09

## 2017-09-14 RX ADMIN — Medication 600 MILLIGRAM(S): at 23:30

## 2017-09-14 RX ADMIN — BUDESONIDE AND FORMOTEROL FUMARATE DIHYDRATE 2 PUFF(S): 160; 4.5 AEROSOL RESPIRATORY (INHALATION) at 05:41

## 2017-09-14 RX ADMIN — HEPARIN SODIUM 750 UNIT(S)/HR: 5000 INJECTION INTRAVENOUS; SUBCUTANEOUS at 06:18

## 2017-09-14 RX ADMIN — Medication 0.5 INCH(S): at 14:45

## 2017-09-14 RX ADMIN — Medication 60 MILLIGRAM(S): at 05:41

## 2017-09-14 RX ADMIN — ISOSORBIDE MONONITRATE 30 MILLIGRAM(S): 60 TABLET, EXTENDED RELEASE ORAL at 16:52

## 2017-09-14 RX ADMIN — Medication 50 MILLIGRAM(S): at 16:52

## 2017-09-14 RX ADMIN — FENTANYL CITRATE 25 MICROGRAM(S): 50 INJECTION INTRAVENOUS at 17:22

## 2017-09-14 RX ADMIN — Medication 81 MILLIGRAM(S): at 05:41

## 2017-09-14 RX ADMIN — RANOLAZINE 1000 MILLIGRAM(S): 500 TABLET, FILM COATED, EXTENDED RELEASE ORAL at 05:40

## 2017-09-14 RX ADMIN — CLOPIDOGREL BISULFATE 75 MILLIGRAM(S): 75 TABLET, FILM COATED ORAL at 05:41

## 2017-09-14 RX ADMIN — TAMSULOSIN HYDROCHLORIDE 0.4 MILLIGRAM(S): 0.4 CAPSULE ORAL at 23:10

## 2017-09-14 RX ADMIN — Medication 5 MILLIGRAM(S): at 05:40

## 2017-09-14 NOTE — CONSULT NOTE ADULT - ASSESSMENT
71 y/o M with Right functional solitary kidney now obstructed and in renal failure with NSTEMI    -Currently patient undergoing cardiac cath. Of prime importance is patient's cardiac status and hemodynamic stability. Given active MI, patient is very high risk for cardiac morbidity if given anesthesia for possible right ureteral stent. Discussed with IR for Right nephrostomy tube under local but patient already on heparin gtt and plavix loaded and is at considerable risk for bleeding post-op and is also not a candidate for this    -Continue HD as needed with Renal  -Once patient stabilizes from a cardiac stand point we will look into options for decompressing the right kidney, however too high risk at this point  -Urine and blood cultures are negative from original hospital, however, patient with nitrite positive urine. Would treat with a total of 7 day of abx  -Will continue to follow while inpatient    -Case discussed with Chief Resident Anthony Campo and Attendings Eugenio Yanes and Hector Cochran

## 2017-09-14 NOTE — PROGRESS NOTE ADULT - PROBLEM SELECTOR PLAN 1
PMH CAD w/ multiple stents. Diagnostic cath 9/12 diffuse 99% stenosis in mid LAD, 30% stenosis in mid PCX, 80% stenosis in mid RCA, 85% distal RCA. Therapeutic cath 9/14 - 2 stents in LAD, 3 in RCA.  - c/w ASA, plavix  - c/w ranexa, imdur  - followed by cards PMH CAD w/ multiple stents. Diagnostic cath 9/12 diffuse 99% stenosis in mid LAD, 30% stenosis in mid PCX, 80% stenosis in mid RCA, 85% distal RCA. Therapeutic cath 9/14 - 2 stents in LAD, 3 in RCA.  - c/w ASA, plavix, lipitor  - c/w ranexa, imdur  - followed by cards PMH CAD w/ multiple stents. Diagnostic cath 9/12 diffuse 99% stenosis in mid LAD, 30% stenosis in mid PCX, 80% stenosis in mid RCA, 85% distal RCA. Therapeutic cath 9/14 - 2 stents in LAD, 3 in RCA.  - c/w ASA, plavix, lipitor  - c/w ranexa, imdur  - c/w procardia xl  - followed by cards

## 2017-09-14 NOTE — PROGRESS NOTE ADULT - PROBLEM SELECTOR PLAN 2
Pt. w/o PMH CHF. Echo showing EF 35%. Mild-moderate MR, moderate AS, mild LV enlargement, eccentric LV hypertrophy (dilated LV w/ nl relative wall thickness). Mod-severe segmental LV sys dysfunction. The mid to distal septum, mid to distal inferior, apical cap akinetic. Nl RV size & sys fxn  - c/w procardia xl  - c/w metoprolol 40 q8 Pt. w/o PMH CHF. Echo showing EF 35%. Mild-moderate MR, moderate AS, mild LV enlargement, eccentric LV hypertrophy (dilated LV w/ nl relative wall thickness). Mod-severe segmental LV sys dysfunction. The mid to distal septum, mid to distal inferior, apical cap akinetic. Nl RV size & sys fxn  - c/w metoprolol 40 q8

## 2017-09-14 NOTE — PROGRESS NOTE ADULT - SUBJECTIVE AND OBJECTIVE BOX
No CP, SOB    Vital Signs Last 24 Hrs  T(C): 36.8 (09-14-17 @ 11:45), Max: 36.9 (09-13-17 @ 19:28)  T(F): 98.3 (09-14-17 @ 11:45), Max: 98.4 (09-13-17 @ 19:28)  HR: 78 (09-14-17 @ 11:45) (78 - 84)  BP: 145/89 (09-14-17 @ 11:45) (131/68 - 148/81)  BP(mean): --  RR: 17 (09-14-17 @ 11:45) (17 - 18)  SpO2: 94% (09-14-17 @ 11:45) (94% - 97%)    Physical Exam:  · Constitutional	Well-developed, well nourished	  · Neck	supple	  		  · Respiratory Details	decr BS at bases b/l	  · Cardiovascular	Regular rate & rhythm, normal S1, S2; no murmurs, gallops or rubs	  · Gastrointestinal	Soft, non-tender, ND, normal bowel sounds	  · Extremities	no edema	  · Extremities Comments	Right femoral dialysis catheter in place	  		  		  		      insulin lispro (HumaLOG) corrective regimen sliding scale   SubCutaneous three times a day before meals  insulin lispro (HumaLOG) corrective regimen sliding scale   SubCutaneous at bedtime  dextrose 5%. 1000 milliLiter(s) IV Continuous <Continuous>  dextrose Gel 1 Dose(s) Oral once PRN  dextrose 50% Injectable 12.5 Gram(s) IV Push once  dextrose 50% Injectable 25 Gram(s) IV Push once  dextrose 50% Injectable 25 Gram(s) IV Push once  glucagon  Injectable 1 milliGRAM(s) IntraMuscular once PRN  aspirin enteric coated 81 milliGRAM(s) Oral daily  tamsulosin 0.4 milliGRAM(s) Oral at bedtime  isosorbide   mononitrate ER Tablet (IMDUR) 30 milliGRAM(s) Oral daily  ranolazine 1000 milliGRAM(s) Oral two times a day  atorvastatin 40 milliGRAM(s) Oral at bedtime  clopidogrel Tablet 75 milliGRAM(s) Oral daily  heparin  Infusion.  Unit(s)/Hr IV Continuous <Continuous>  heparin  Injectable 5000 Unit(s) IV Push every 6 hours PRN  cefTRIAXone   IVPB 1 Gram(s) IV Intermittent every 24 hours  metoprolol 50 milliGRAM(s) Oral every 8 hours  NIFEdipine XL 60 milliGRAM(s) Oral daily  buDESOnide  80 MICROgram(s)/formoterol 4.5 MICROgram(s) Inhaler 2 Puff(s) Inhalation two times a day  ALBUTerol    90 MICROgram(s) HFA Inhaler 2 Puff(s) Inhalation every 6 hours PRN  sevelamer hydrochloride 800 milliGRAM(s) Oral three times a day with meals  busPIRone 5 milliGRAM(s) Oral two times a day  cefTRIAXone   IVPB                            11.4   12.3  )-----------( 285      ( 14 Sep 2017 05:06 )             33.8     14 Sep 2017 05:06    137    |  92     |  95     ----------------------------<  137    5.3     |  17     |  8.84     Ca    9.5        14 Sep 2017 05:06           Assessment /  Plan:    DM, obstructive LIN on CKD in setting of L kidney atrophy and R hydro due to stone,  started on HD in [facility]  S/p cath 9/12  S?p HD #3 9/13 am  2K bath  Heparin free  For PCI today  HD again post PCI 9/14 pm or 9/15 am  Pt w/LIN on CKD in setting of L kidney atrophy and obstructed R kidney   to eval  BMP daily  No nephrotoxins

## 2017-09-14 NOTE — PROGRESS NOTE ADULT - PROBLEM SELECTOR PLAN 3
Patient with h/o HTN  - LIN on CKD in setting of L kidney atrophy (on US) and obstructed R kidney with hydro. Cr 2 mo ago 1.2. 9.8 on admission to [facility]. Patient began HD at [facility]. HD#3 9/13 am.   - HD tonight or tomorrow morning  - pull groin shiley HD cath after next HD   - will talk to IR in morning about placing new shiley & possible percutaneous nephrostomy  - currently with indwelling james

## 2017-09-14 NOTE — PROGRESS NOTE ADULT - SUBJECTIVE AND OBJECTIVE BOX
CC:    HPI:          Daughter Anisa Lara  [phone]  renal is dr charles mosquera [phone]  cardiology dr lord (12 Sep 2017 19:23)      PAST MEDICAL & SURGICAL HISTORY:  CVA (cerebral vascular accident)  COPD (chronic obstructive pulmonary disease)  BPH (benign prostatic hyperplasia)  Bipolar affective disorder  CKD (chronic kidney disease)  Pancreatitis  Hypertension  Dyslipidemia  Diabetes mellitus  Coronary artery disease  History of cardiac catheterization      Review of Systems:   CONSTITUTIONAL: No fever, weight loss, or fatigue  EYES: No eye pain, visual disturbances, or discharge  ENMT:  No difficulty hearing, tinnitus, vertigo; No sinus or throat pain  NECK: No pain or stiffness  BREASTS: No pain, masses, or nipple discharge  RESPIRATORY: No cough, wheezing, chills or hemoptysis; No shortness of breath  CARDIOVASCULAR: No chest pain, palpitations, dizziness, or leg swelling  GASTROINTESTINAL: No abdominal or epigastric pain. No nausea, vomiting, or hematemesis; No diarrhea or constipation. No melena or hematochezia.  GENITOURINARY: No dysuria, frequency, hematuria, or incontinence  NEUROLOGICAL: No headaches, memory loss, loss of strength, numbness, or tremors  SKIN: No itching, burning, rashes, or lesions   LYMPH NODES: No enlarged glands  ENDOCRINE: No heat or cold intolerance; No hair loss  MUSCULOSKELETAL: No joint pain or swelling; No muscle, back, or extremity pain  PSYCHIATRIC: No depression, anxiety, mood swings, or difficulty sleeping  HEME/LYMPH: No easy bruising, or bleeding gums  ALLERY AND IMMUNOLOGIC: No hives or eczema  [ ] all other systems negative or as per HPI    Allergies  No Known Allergies    Social History:       FAMILY HISTORY:      MEDICATIONS  (STANDING):  insulin lispro (HumaLOG) corrective regimen sliding scale   SubCutaneous three times a day before meals  insulin lispro (HumaLOG) corrective regimen sliding scale   SubCutaneous at bedtime  dextrose 5%. 1000 milliLiter(s) (50 mL/Hr) IV Continuous <Continuous>  dextrose 50% Injectable 12.5 Gram(s) IV Push once  dextrose 50% Injectable 25 Gram(s) IV Push once  dextrose 50% Injectable 25 Gram(s) IV Push once  aspirin enteric coated 81 milliGRAM(s) Oral daily  tamsulosin 0.4 milliGRAM(s) Oral at bedtime  isosorbide   mononitrate ER Tablet (IMDUR) 30 milliGRAM(s) Oral daily  ranolazine 1000 milliGRAM(s) Oral two times a day  atorvastatin 40 milliGRAM(s) Oral at bedtime  clopidogrel Tablet 75 milliGRAM(s) Oral daily  cefTRIAXone   IVPB 1 Gram(s) IV Intermittent every 24 hours  metoprolol 50 milliGRAM(s) Oral every 8 hours  NIFEdipine XL 60 milliGRAM(s) Oral daily  buDESOnide  80 MICROgram(s)/formoterol 4.5 MICROgram(s) Inhaler 2 Puff(s) Inhalation two times a day  sevelamer hydrochloride 800 milliGRAM(s) Oral three times a day with meals  busPIRone 5 milliGRAM(s) Oral two times a day  cefTRIAXone   IVPB      acetylcysteine  Oral Solution 600 milliGRAM(s) Oral two times a day    MEDICATIONS  (PRN):  dextrose Gel 1 Dose(s) Oral once PRN Blood Glucose LESS THAN 70 milliGRAM(s)/deciliter  glucagon  Injectable 1 milliGRAM(s) IntraMuscular once PRN Glucose LESS THAN 70 milligrams/deciliter  ALBUTerol    90 MICROgram(s) HFA Inhaler 2 Puff(s) Inhalation every 6 hours PRN Shortness of Breath      Vital Signs Last 24 Hrs  T(C): 36.8 (09-14-17 @ 11:45)  T(F): 98.3 (09-14-17 @ 11:45), Max: 98.4 (09-13-17 @ 19:28)  HR: 80 (09-14-17 @ 15:20) (78 - 80)  BP: 158/80 (09-14-17 @ 15:20)  BP(mean): --  RR: 18 (09-14-17 @ 15:20) (17 - 18)  SpO2: 95% (09-14-17 @ 15:20) (94% - 97%)  Wt(kg): --    09-13 @ 07:01  -  09-14 @ 07:00  --------------------------------------------------------  IN: 840 mL / OUT: 2550 mL / NET: -1710 mL    09-14 @ 07:01 - 09-14 @ 16:11  --------------------------------------------------------  IN: 480 mL / OUT: 150 mL / NET: 330 mL      CAPILLARY BLOOD GLUCOSE  168 (14 Sep 2017 11:05)  171 (14 Sep 2017 07:26)  271 (13 Sep 2017 20:57)  148 (13 Sep 2017 17:09)          PHYSICAL EXAM:  GENERAL: NAD, well-developed  HEAD:  Atraumatic, Normocephalic  EYES: EOMI, PERRLA, conjunctiva and sclera clear  NECK: Supple, No JVD  CHEST/LUNG: Clear to auscultation bilaterally; No wheeze  HEART: Regular rate and rhythm; No murmurs, rubs, or gallops  ABDOMEN: Soft, Nontender, Nondistended; Bowel sounds present  EXTREMITIES:  2+ Peripheral Pulses, No clubbing, cyanosis, or edema  PSYCH: AAOx3  NEUROLOGY: non-focal  SKIN: No rashes or lesions    LABS:                        11.4   12.3  )-----------( 285      ( 14 Sep 2017 05:06 )             33.8     09-14    137  |  92<L>  |  95<H>  ----------------------------<  137<H>  5.3   |  17<L>  |  8.84<H>    Ca    9.5      14 Sep 2017 05:06    PT/INR - ( 14 Sep 2017 05:06 )   PT: 14.7 sec;   INR: 1.34 ratio    PTT - ( 14 Sep 2017 05:06 )  PTT:90.9 sec        RADIOLOGY & ADDITIONAL TESTS:    Imaging Personally Reviewed:    Consultant(s) Notes Reviewed:      Care Discussed with Consultants/Other Providers: CC:    HPI:          Daughter Anisa Lara  [phone]  renal is dr charles mosquera [phone]  cardiology dr lord (12 Sep 2017 19:23)      PAST MEDICAL & SURGICAL HISTORY:  CVA (cerebral vascular accident)  COPD (chronic obstructive pulmonary disease)  BPH (benign prostatic hyperplasia)  Bipolar affective disorder  CKD (chronic kidney disease)  Pancreatitis  Hypertension  Dyslipidemia  Diabetes mellitus  Coronary artery disease  History of cardiac catheterization      Review of Systems:   CONSTITUTIONAL: No fever, weight loss, or fatigue  EYES: No eye pain, visual disturbances, or discharge  ENMT:  No difficulty hearing, tinnitus, vertigo; No sinus or throat pain  NECK: No pain or stiffness  BREASTS: No pain, masses, or nipple discharge  RESPIRATORY: No cough, wheezing, chills or hemoptysis; No shortness of breath  CARDIOVASCULAR: No chest pain, palpitations, dizziness, or leg swelling  GASTROINTESTINAL: No abdominal or epigastric pain. No nausea, vomiting, or hematemesis; No diarrhea or constipation. No melena or hematochezia.  GENITOURINARY: No dysuria, frequency, hematuria, or incontinence  NEUROLOGICAL: No headaches, memory loss, loss of strength, numbness, or tremors  SKIN: No itching, burning, rashes, or lesions   LYMPH NODES: No enlarged glands  ENDOCRINE: No heat or cold intolerance; No hair loss  MUSCULOSKELETAL: No joint pain or swelling; No muscle, back, or extremity pain  PSYCHIATRIC: No depression, anxiety, mood swings, or difficulty sleeping  HEME/LYMPH: No easy bruising, or bleeding gums  ALLERY AND IMMUNOLOGIC: No hives or eczema  [ ] all other systems negative or as per HPI    Allergies  No Known Allergies    Social History:       FAMILY HISTORY:      MEDICATIONS  (STANDING):  insulin lispro (HumaLOG) corrective regimen sliding scale   SubCutaneous three times a day before meals  insulin lispro (HumaLOG) corrective regimen sliding scale   SubCutaneous at bedtime  dextrose 5%. 1000 milliLiter(s) (50 mL/Hr) IV Continuous <Continuous>  dextrose 50% Injectable 12.5 Gram(s) IV Push once  dextrose 50% Injectable 25 Gram(s) IV Push once  dextrose 50% Injectable 25 Gram(s) IV Push once  aspirin enteric coated 81 milliGRAM(s) Oral daily  tamsulosin 0.4 milliGRAM(s) Oral at bedtime  isosorbide   mononitrate ER Tablet (IMDUR) 30 milliGRAM(s) Oral daily  ranolazine 1000 milliGRAM(s) Oral two times a day  atorvastatin 40 milliGRAM(s) Oral at bedtime  clopidogrel Tablet 75 milliGRAM(s) Oral daily  cefTRIAXone   IVPB 1 Gram(s) IV Intermittent every 24 hours  metoprolol 50 milliGRAM(s) Oral every 8 hours  NIFEdipine XL 60 milliGRAM(s) Oral daily  buDESOnide  80 MICROgram(s)/formoterol 4.5 MICROgram(s) Inhaler 2 Puff(s) Inhalation two times a day  sevelamer hydrochloride 800 milliGRAM(s) Oral three times a day with meals  busPIRone 5 milliGRAM(s) Oral two times a day  cefTRIAXone   IVPB      acetylcysteine  Oral Solution 600 milliGRAM(s) Oral two times a day    MEDICATIONS  (PRN):  dextrose Gel 1 Dose(s) Oral once PRN Blood Glucose LESS THAN 70 milliGRAM(s)/deciliter  glucagon  Injectable 1 milliGRAM(s) IntraMuscular once PRN Glucose LESS THAN 70 milligrams/deciliter  ALBUTerol    90 MICROgram(s) HFA Inhaler 2 Puff(s) Inhalation every 6 hours PRN Shortness of Breath      Vital Signs Last 24 Hrs  T(C): 36.8 (09-14-17 @ 11:45)  T(F): 98.3 (09-14-17 @ 11:45), Max: 98.4 (09-13-17 @ 19:28)  HR: 80 (09-14-17 @ 15:20) (78 - 80)  BP: 158/80 (09-14-17 @ 15:20)  BP(mean): --  RR: 18 (09-14-17 @ 15:20) (17 - 18)  SpO2: 95% (09-14-17 @ 15:20) (94% - 97%)  Wt(kg): --    09-13 @ 07:01  -  09-14 @ 07:00  --------------------------------------------------------  IN: 840 mL / OUT: 2550 mL / NET: -1710 mL    09-14 @ 07:01 - 09-14 @ 16:11  --------------------------------------------------------  IN: 480 mL / OUT: 150 mL / NET: 330 mL      CAPILLARY BLOOD GLUCOSE  168 (14 Sep 2017 11:05)  171 (14 Sep 2017 07:26)  271 (13 Sep 2017 20:57)  148 (13 Sep 2017 17:09)          PHYSICAL EXAM:  GENERAL: NAD, well-developed  HEAD:  Atraumatic, Normocephalic  EYES: EOMI, PERRLA, conjunctiva and sclera clear  NECK: Supple, No JVD  CHEST/LUNG: Clear to auscultation bilaterally; No wheeze  HEART: Regular rate and rhythm; No murmurs, rubs, or gallops  ABDOMEN: Soft, Nontender, Nondistended; Bowel sounds present  EXTREMITIES:  2+ Peripheral Pulses, No clubbing, cyanosis, or edema  PSYCH: AAOx3  NEUROLOGY: non-focal  SKIN: No rashes or lesions    LABS:    Labs reviewed personally.                        11.4   12.3  )-----------( 285      ( 14 Sep 2017 05:06 )             33.8     Hgb Trend: 11.4<--, 11.3<--  09-14    137  |  92<L>  |  95<H>  ----------------------------<  137<H>  5.3   |  17<L>  |  8.84<H>    Ca    9.5      14 Sep 2017 05:06    Creatinine Trend: 8.84<--, 9.79<--    PT/INR - ( 14 Sep 2017 05:06 )   PT: 14.7 sec;   INR: 1.34 ratio         PTT - ( 14 Sep 2017 05:06 )  PTT:90.9 sec      Imaging reviewed personally. 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 Patient was admitted and started on ceftriaxone.           Daughter Anisa Lara  [phone]  renal is dr charles mosquera [phone]  cardiology dr lord (12 Sep 2017 19:23)      PAST MEDICAL & SURGICAL HISTORY:  CVA (cerebral vascular accident)  COPD (chronic obstructive pulmonary disease)  BPH (benign prostatic hyperplasia)  Bipolar affective disorder  CKD (chronic kidney disease)  Pancreatitis  Hypertension  Dyslipidemia  Diabetes mellitus  Coronary artery disease  History of cardiac catheterization      Review of Systems:   CONSTITUTIONAL: No fever, weight loss, or fatigue  EYES: No eye pain, visual disturbances, or discharge  ENMT:  No difficulty hearing, tinnitus, vertigo; No sinus or throat pain  NECK: No pain or stiffness  BREASTS: No pain, masses, or nipple discharge  RESPIRATORY: No cough, wheezing, chills or hemoptysis; No shortness of breath  CARDIOVASCULAR: No chest pain, palpitations, dizziness, or leg swelling  GASTROINTESTINAL: No abdominal or epigastric pain. No nausea, vomiting, or hematemesis; No diarrhea or constipation. No melena or hematochezia.  GENITOURINARY: No dysuria, frequency, hematuria, or incontinence  NEUROLOGICAL: No headaches, memory loss, loss of strength, numbness, or tremors  SKIN: No itching, burning, rashes, or lesions   LYMPH NODES: No enlarged glands  ENDOCRINE: No heat or cold intolerance; No hair loss  MUSCULOSKELETAL: No joint pain or swelling; No muscle, back, or extremity pain  PSYCHIATRIC: No depression, anxiety, mood swings, or difficulty sleeping  HEME/LYMPH: No easy bruising, or bleeding gums  ALLERY AND IMMUNOLOGIC: No hives or eczema  [ ] all other systems negative or as per HPI    Allergies  No Known Allergies    Social History:       FAMILY HISTORY:      MEDICATIONS  (STANDING):  insulin lispro (HumaLOG) corrective regimen sliding scale   SubCutaneous three times a day before meals  insulin lispro (HumaLOG) corrective regimen sliding scale   SubCutaneous at bedtime  dextrose 5%. 1000 milliLiter(s) (50 mL/Hr) IV Continuous <Continuous>  dextrose 50% Injectable 12.5 Gram(s) IV Push once  dextrose 50% Injectable 25 Gram(s) IV Push once  dextrose 50% Injectable 25 Gram(s) IV Push once  aspirin enteric coated 81 milliGRAM(s) Oral daily  tamsulosin 0.4 milliGRAM(s) Oral at bedtime  isosorbide   mononitrate ER Tablet (IMDUR) 30 milliGRAM(s) Oral daily  ranolazine 1000 milliGRAM(s) Oral two times a day  atorvastatin 40 milliGRAM(s) Oral at bedtime  clopidogrel Tablet 75 milliGRAM(s) Oral daily  cefTRIAXone   IVPB 1 Gram(s) IV Intermittent every 24 hours  metoprolol 50 milliGRAM(s) Oral every 8 hours  NIFEdipine XL 60 milliGRAM(s) Oral daily  buDESOnide  80 MICROgram(s)/formoterol 4.5 MICROgram(s) Inhaler 2 Puff(s) Inhalation two times a day  sevelamer hydrochloride 800 milliGRAM(s) Oral three times a day with meals  busPIRone 5 milliGRAM(s) Oral two times a day  cefTRIAXone   IVPB      acetylcysteine  Oral Solution 600 milliGRAM(s) Oral two times a day    MEDICATIONS  (PRN):  dextrose Gel 1 Dose(s) Oral once PRN Blood Glucose LESS THAN 70 milliGRAM(s)/deciliter  glucagon  Injectable 1 milliGRAM(s) IntraMuscular once PRN Glucose LESS THAN 70 milligrams/deciliter  ALBUTerol    90 MICROgram(s) HFA Inhaler 2 Puff(s) Inhalation every 6 hours PRN Shortness of Breath      Vital Signs Last 24 Hrs  T(C): 36.8 (09-14-17 @ 11:45)  T(F): 98.3 (09-14-17 @ 11:45), Max: 98.4 (09-13-17 @ 19:28)  HR: 80 (09-14-17 @ 15:20) (78 - 80)  BP: 158/80 (09-14-17 @ 15:20)  BP(mean): --  RR: 18 (09-14-17 @ 15:20) (17 - 18)  SpO2: 95% (09-14-17 @ 15:20) (94% - 97%)  Wt(kg): --    09-13 @ 07:01 - 09-14 @ 07:00  --------------------------------------------------------  IN: 840 mL / OUT: 2550 mL / NET: -1710 mL    09-14 @ 07:01 - 09-14 @ 16:11  --------------------------------------------------------  IN: 480 mL / OUT: 150 mL / NET: 330 mL      CAPILLARY BLOOD GLUCOSE  168 (14 Sep 2017 11:05)  171 (14 Sep 2017 07:26)  271 (13 Sep 2017 20:57)  148 (13 Sep 2017 17:09)          PHYSICAL EXAM:  GENERAL: NAD, well-developed  HEAD:  Atraumatic, Normocephalic  EYES: EOMI, PERRLA, conjunctiva and sclera clear  NECK: Supple, No JVD  CHEST/LUNG: Clear to auscultation bilaterally; No wheeze  HEART: Regular rate and rhythm; No murmurs, rubs, or gallops  ABDOMEN: Soft, Nontender, Nondistended; Bowel sounds present  EXTREMITIES:  2+ Peripheral Pulses, No clubbing, cyanosis, or edema  PSYCH: AAOx3  NEUROLOGY: non-focal  SKIN: No rashes or lesions    LABS:    Labs reviewed personally.                        11.4   12.3  )-----------( 285      ( 14 Sep 2017 05:06 )             33.8     Hgb Trend: 11.4<--, 11.3<--  09-14    137  |  92<L>  |  95<H>  ----------------------------<  137<H>  5.3   |  17<L>  |  8.84<H>    Ca    9.5      14 Sep 2017 05:06    Creatinine Trend: 8.84<--, 9.79<--    PT/INR - ( 14 Sep 2017 05:06 )   PT: 14.7 sec;   INR: 1.34 ratio         PTT - ( 14 Sep 2017 05:06 )  PTT:90.9 sec      Imaging reviewed personally. 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 Patient was admitted and started on ceftriaxone. 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 Patient was making no urine, and [facility] Course c/b NSTEMI with +CE, and was started on heparin gtt, ASA, plavix. TTE showed new onset CHF with EF 35%. Patient was intubated in ICU for hypoxic respiratory distress from pulmonary edema. Course further c/b hypertensive emergency with NTG gtt. Patient was transferred to [facility] for cardiac angiogram on 9/12.     At [facility] patient received diagnostic cath showing stenosis in LAD and RCA. LAD (2) and RCA (3) stents placed on 9/14. Patient is continuing to receive dialysis as his LIN is not resolving. Patient was also seen by urology who believe patient is cardiologically too high risk for R ureteral stent.     At time of exam, patient is s/p stent placement. He endorses 8/10 crushing chest pain that radiates to his armpit, and feels "choking". Pain is unrelieved by nitropaste. Patient denies SOB, F/C.     Daughter Anisa Lara  [phone]  renal is dr charles mosquera [phone]  cardiology dr lord (12 Sep 2017 19:23)      PAST MEDICAL & SURGICAL HISTORY:  CVA (cerebral vascular accident)  COPD (chronic obstructive pulmonary disease)  BPH (benign prostatic hyperplasia)  Bipolar affective disorder  CKD (chronic kidney disease)  Pancreatitis  Hypertension  Dyslipidemia  Diabetes mellitus  Coronary artery disease  History of cardiac catheterization      REVIEW OF SYSTEMS:    CONSTITUTIONAL: Denies weakness, fevers and chills  EYES/ENT: Denies visual changes;  denies vertigo and throat pain   NECK: Denies neck pain and stiffness  RESPIRATORY: Denies shortness of breath, denies cough, wheezing, hemoptysis  CARDIOVASCULAR: ++ Chest pain, Denies palpitations  ENDOCRINE: Denies weight loss/gain. Denies cold or heat intolerance  GASTROINTESTINAL: Denies abdominal or epigastric pain. denies nausea, vomiting, and hematemesis; Denies diarrhea & constipation. Denies melena and hematochezia.  GENITOURINARY: Denies dysuria, frequency and hematuria  NEUROLOGICAL: Denies numbness and weakness  SKIN: Denies itching and rashes    Allergies  No Known Allergies    Social History: Lives at home with his wife. Denies a/t/d.    MEDICATIONS  (STANDING):  insulin lispro (HumaLOG) corrective regimen sliding scale   SubCutaneous three times a day before meals  insulin lispro (HumaLOG) corrective regimen sliding scale   SubCutaneous at bedtime  dextrose 5%. 1000 milliLiter(s) (50 mL/Hr) IV Continuous <Continuous>  dextrose 50% Injectable 12.5 Gram(s) IV Push once  dextrose 50% Injectable 25 Gram(s) IV Push once  dextrose 50% Injectable 25 Gram(s) IV Push once  aspirin enteric coated 81 milliGRAM(s) Oral daily  tamsulosin 0.4 milliGRAM(s) Oral at bedtime  isosorbide   mononitrate ER Tablet (IMDUR) 30 milliGRAM(s) Oral daily  ranolazine 1000 milliGRAM(s) Oral two times a day  atorvastatin 40 milliGRAM(s) Oral at bedtime  clopidogrel Tablet 75 milliGRAM(s) Oral daily  cefTRIAXone   IVPB 1 Gram(s) IV Intermittent every 24 hours  metoprolol 50 milliGRAM(s) Oral every 8 hours  NIFEdipine XL 60 milliGRAM(s) Oral daily  buDESOnide  80 MICROgram(s)/formoterol 4.5 MICROgram(s) Inhaler 2 Puff(s) Inhalation two times a day  sevelamer hydrochloride 800 milliGRAM(s) Oral three times a day with meals  busPIRone 5 milliGRAM(s) Oral two times a day  cefTRIAXone   IVPB      acetylcysteine  Oral Solution 600 milliGRAM(s) Oral two times a day    MEDICATIONS  (PRN):  dextrose Gel 1 Dose(s) Oral once PRN Blood Glucose LESS THAN 70 milliGRAM(s)/deciliter  glucagon  Injectable 1 milliGRAM(s) IntraMuscular once PRN Glucose LESS THAN 70 milligrams/deciliter  ALBUTerol    90 MICROgram(s) HFA Inhaler 2 Puff(s) Inhalation every 6 hours PRN Shortness of Breath      Vital Signs Last 24 Hrs  T(C): 36.8 (09-14-17 @ 11:45)  T(F): 98.3 (09-14-17 @ 11:45), Max: 98.4 (09-13-17 @ 19:28)  HR: 80 (09-14-17 @ 15:20) (78 - 80)  BP: 158/80 (09-14-17 @ 15:20)  BP(mean): --  RR: 18 (09-14-17 @ 15:20) (17 - 18)  SpO2: 95% (09-14-17 @ 15:20) (94% - 97%)  Wt(kg): --    09-13 @ 07:01  -  09-14 @ 07:00  --------------------------------------------------------  IN: 840 mL / OUT: 2550 mL / NET: -1710 mL    09-14 @ 07:01  -  09-14 @ 16:11  --------------------------------------------------------  IN: 480 mL / OUT: 150 mL / NET: 330 mL      CAPILLARY BLOOD GLUCOSE  168 (14 Sep 2017 11:05)  171 (14 Sep 2017 07:26)  271 (13 Sep 2017 20:57)  148 (13 Sep 2017 17:09)          PHYSICAL EXAM:  GENERAL: NAD, well-developed  HEAD:  Atraumatic, Normocephalic  EYES: EOMI, PERRLA, conjunctiva and sclera clear  NECK: Supple, No JVD  CHEST/LUNG: Clear to auscultation bilaterally; No wheeze  HEART: Regular rate and rhythm; No murmurs, rubs, or gallops  ABDOMEN: Soft, Nontender, Nondistended; Bowel sounds present  EXTREMITIES:  1+ L DP 2+ other pulses, No clubbing, cyanosis, or edema  PSYCH: AAOx3  NEUROLOGY: non-focal  SKIN: Hematoma at site of impella in L femoral region. No rashes or lesions  TUBES: Shiley R femoral. L arm peripheral IV. James    LABS:    Labs reviewed personally.                        11.4   12.3  )-----------( 285      ( 14 Sep 2017 05:06 )             33.8     Hgb Trend: 11.4<--, 11.3<--  09-14    137  |  92<L>  |  95<H>  ----------------------------<  137<H>  5.3   |  17<L>  |  8.84<H>    Ca    9.5      14 Sep 2017 05:06    Creatinine Trend: 8.84<--, 9.79<--    PT/INR - ( 14 Sep 2017 05:06 )   PT: 14.7 sec;   INR: 1.34 ratio         PTT - ( 14 Sep 2017 05:06 )  PTT:90.9 sec      Imaging reviewed personally. 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 Patient was admitted and started on ceftriaxone. 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 Patient was making no urine, and was started on HD x2 at [facility]. Course c/b NSTEMI with +CE, and was started on heparin gtt, ASA, plavix. TTE showed new onset CHF with EF 35%. Patient was intubated in ICU for hypoxic respiratory distress from pulmonary edema. Course further c/b hypertensive emergency with NTG gtt. Patient was transferred to [facility] for cardiac angiogram on 9/12.     At [facility] patient received diagnostic cath showing stenosis in LAD and RCA. LAD (2) and RCA (3) stents placed on 9/14. Patient is continuing to receive dialysis as his LIN is not resolving. Patient was also seen by urology who believe patient is cardiologically too high risk for R ureteral stent.     At time of exam, patient is s/p stent placement. He endorses 8/10 crushing chest pain that radiates to his armpit, and feels "choking". Pain is unrelieved by nitropaste. Patient denies SOB, F/C. EKG reviewed by cardiology largely unchanged since cath this afternoon.     Daughter Anisa Lara  [phone]  renal is dr charles mosquera [phone]  cardiology dr lord (12 Sep 2017 19:23)      PAST MEDICAL & SURGICAL HISTORY:  CVA (cerebral vascular accident)  COPD (chronic obstructive pulmonary disease)  BPH (benign prostatic hyperplasia)  Bipolar affective disorder  CKD (chronic kidney disease)  Pancreatitis  Hypertension  Dyslipidemia  Diabetes mellitus  Coronary artery disease  History of cardiac catheterization      REVIEW OF SYSTEMS:    CONSTITUTIONAL: Denies weakness, fevers and chills  EYES/ENT: Denies visual changes;  denies vertigo and throat pain   NECK: Denies neck pain and stiffness  RESPIRATORY: Denies shortness of breath, denies cough, wheezing, hemoptysis  CARDIOVASCULAR: ++ Chest pain, Denies palpitations  ENDOCRINE: Denies weight loss/gain. Denies cold or heat intolerance  GASTROINTESTINAL: Denies abdominal or epigastric pain. denies nausea, vomiting, and hematemesis; Denies diarrhea & constipation. Denies melena and hematochezia.  GENITOURINARY: Denies dysuria, frequency and hematuria  NEUROLOGICAL: Denies numbness and weakness  SKIN: Denies itching and rashes    Allergies  No Known Allergies    Social History: Lives at home with his wife. Denies a/t/d.    MEDICATIONS  (STANDING):  insulin lispro (HumaLOG) corrective regimen sliding scale   SubCutaneous three times a day before meals  insulin lispro (HumaLOG) corrective regimen sliding scale   SubCutaneous at bedtime  dextrose 5%. 1000 milliLiter(s) (50 mL/Hr) IV Continuous <Continuous>  dextrose 50% Injectable 12.5 Gram(s) IV Push once  dextrose 50% Injectable 25 Gram(s) IV Push once  dextrose 50% Injectable 25 Gram(s) IV Push once  aspirin enteric coated 81 milliGRAM(s) Oral daily  tamsulosin 0.4 milliGRAM(s) Oral at bedtime  isosorbide   mononitrate ER Tablet (IMDUR) 30 milliGRAM(s) Oral daily  ranolazine 1000 milliGRAM(s) Oral two times a day  atorvastatin 40 milliGRAM(s) Oral at bedtime  clopidogrel Tablet 75 milliGRAM(s) Oral daily  cefTRIAXone   IVPB 1 Gram(s) IV Intermittent every 24 hours  metoprolol 50 milliGRAM(s) Oral every 8 hours  NIFEdipine XL 60 milliGRAM(s) Oral daily  buDESOnide  80 MICROgram(s)/formoterol 4.5 MICROgram(s) Inhaler 2 Puff(s) Inhalation two times a day  sevelamer hydrochloride 800 milliGRAM(s) Oral three times a day with meals  busPIRone 5 milliGRAM(s) Oral two times a day  cefTRIAXone   IVPB      acetylcysteine  Oral Solution 600 milliGRAM(s) Oral two times a day    MEDICATIONS  (PRN):  dextrose Gel 1 Dose(s) Oral once PRN Blood Glucose LESS THAN 70 milliGRAM(s)/deciliter  glucagon  Injectable 1 milliGRAM(s) IntraMuscular once PRN Glucose LESS THAN 70 milligrams/deciliter  ALBUTerol    90 MICROgram(s) HFA Inhaler 2 Puff(s) Inhalation every 6 hours PRN Shortness of Breath      Vital Signs Last 24 Hrs  T(C): 36.8 (09-14-17 @ 11:45)  T(F): 98.3 (09-14-17 @ 11:45), Max: 98.4 (09-13-17 @ 19:28)  HR: 80 (09-14-17 @ 15:20) (78 - 80)  BP: 158/80 (09-14-17 @ 15:20)  BP(mean): --  RR: 18 (09-14-17 @ 15:20) (17 - 18)  SpO2: 95% (09-14-17 @ 15:20) (94% - 97%)  Wt(kg): --    09-13 @ 07:01  -  09-14 @ 07:00  --------------------------------------------------------  IN: 840 mL / OUT: 2550 mL / NET: -1710 mL    09-14 @ 07:01  -  09-14 @ 16:11  --------------------------------------------------------  IN: 480 mL / OUT: 150 mL / NET: 330 mL      CAPILLARY BLOOD GLUCOSE  168 (14 Sep 2017 11:05)  171 (14 Sep 2017 07:26)  271 (13 Sep 2017 20:57)  148 (13 Sep 2017 17:09)          PHYSICAL EXAM:  GENERAL: NAD, well-developed  HEAD:  Atraumatic, Normocephalic  EYES: EOMI, PERRLA, conjunctiva and sclera clear  NECK: Supple, No JVD  CHEST/LUNG: Clear to auscultation bilaterally; No wheeze  HEART: Regular rate and rhythm; No murmurs, rubs, or gallops  ABDOMEN: Soft, Nontender, Nondistended; Bowel sounds present  EXTREMITIES:  1+ L DP 2+ other pulses, No clubbing, cyanosis, or edema  PSYCH: AAOx3  NEUROLOGY: non-focal  SKIN: Hematoma at site of impella in L femoral region. No rashes or lesions  TUBES: Shiley R femoral. L arm peripheral IV. James    LABS:    Labs reviewed personally.                        11.4   12.3  )-----------( 285      ( 14 Sep 2017 05:06 )             33.8     Hgb Trend: 11.4<--, 11.3<--  09-14    137  |  92<L>  |  95<H>  ----------------------------<  137<H>  5.3   |  17<L>  |  8.84<H>    Ca    9.5      14 Sep 2017 05:06    Creatinine Trend: 8.84<--, 9.79<--    PT/INR - ( 14 Sep 2017 05:06 )   PT: 14.7 sec;   INR: 1.34 ratio         PTT - ( 14 Sep 2017 05:06 )  PTT:90.9 sec      Imaging reviewed personally. 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 on 9/6 with 2 days intractable R flank pain radiating to the groin with associated chills. Patient was admitted and started on ceftriaxone. 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 Patient was making no urine, and was started on HD x2 at [facility]. Course c/b NSTEMI with +CE, and was started on heparin gtt, ASA, plavix. TTE showed new onset CHF with EF 35%. Patient was intubated in ICU for hypoxic respiratory distress from pulmonary edema. Course further c/b hypertensive emergency with NTG gtt. Patient was transferred to [facility] for cardiac angiogram on 9/12.     At [facility] patient received diagnostic cath showing stenosis in LAD and RCA. LAD (2) and RCA (3) stents placed on 9/14. Patient is continuing to receive dialysis as his LIN is not resolving. Patient was also seen by urology who believe patient is cardiologically too high risk for R ureteral stent.     At time of exam, patient is s/p stent placement. He endorses 8/10 crushing chest pain that radiates to his armpit, and feels "choking". Pain is unrelieved by nitropaste. However, he does not appear in acute distress. Patient denies SOB, F/C. EKG reviewed by cardiology largely unchanged since cath this afternoon.     Daughter Anisa Lara  [phone]  renal is dr charles msoquera [phone]  cardiology dr lord (12 Sep 2017 19:23)      PAST MEDICAL & SURGICAL HISTORY:  CVA (cerebral vascular accident)  COPD (chronic obstructive pulmonary disease)  BPH (benign prostatic hyperplasia)  Bipolar affective disorder  CKD (chronic kidney disease)  Pancreatitis  Hypertension  Dyslipidemia  Diabetes mellitus  Coronary artery disease  History of cardiac catheterization      REVIEW OF SYSTEMS:    CONSTITUTIONAL: Denies weakness, fevers and chills  EYES/ENT: Denies visual changes;  denies vertigo and throat pain   NECK: Denies neck pain and stiffness  RESPIRATORY: Denies shortness of breath, denies cough, wheezing, hemoptysis  CARDIOVASCULAR: ++ Chest pain, Denies palpitations  ENDOCRINE: Denies weight loss/gain. Denies cold or heat intolerance  GASTROINTESTINAL: Denies abdominal or epigastric pain. denies nausea, vomiting, and hematemesis; Denies diarrhea & constipation. Denies melena and hematochezia.  GENITOURINARY: Denies dysuria, frequency and hematuria  NEUROLOGICAL: Denies numbness and weakness  SKIN: Denies itching and rashes    Allergies  No Known Allergies    Social History: Lives at home with his wife. Denies a/t/d.    MEDICATIONS  (STANDING):  insulin lispro (HumaLOG) corrective regimen sliding scale   SubCutaneous three times a day before meals  insulin lispro (HumaLOG) corrective regimen sliding scale   SubCutaneous at bedtime  dextrose 5%. 1000 milliLiter(s) (50 mL/Hr) IV Continuous <Continuous>  dextrose 50% Injectable 12.5 Gram(s) IV Push once  dextrose 50% Injectable 25 Gram(s) IV Push once  dextrose 50% Injectable 25 Gram(s) IV Push once  aspirin enteric coated 81 milliGRAM(s) Oral daily  tamsulosin 0.4 milliGRAM(s) Oral at bedtime  isosorbide   mononitrate ER Tablet (IMDUR) 30 milliGRAM(s) Oral daily  ranolazine 1000 milliGRAM(s) Oral two times a day  atorvastatin 40 milliGRAM(s) Oral at bedtime  clopidogrel Tablet 75 milliGRAM(s) Oral daily  cefTRIAXone   IVPB 1 Gram(s) IV Intermittent every 24 hours  metoprolol 50 milliGRAM(s) Oral every 8 hours  NIFEdipine XL 60 milliGRAM(s) Oral daily  buDESOnide  80 MICROgram(s)/formoterol 4.5 MICROgram(s) Inhaler 2 Puff(s) Inhalation two times a day  sevelamer hydrochloride 800 milliGRAM(s) Oral three times a day with meals  busPIRone 5 milliGRAM(s) Oral two times a day  cefTRIAXone   IVPB      acetylcysteine  Oral Solution 600 milliGRAM(s) Oral two times a day    MEDICATIONS  (PRN):  dextrose Gel 1 Dose(s) Oral once PRN Blood Glucose LESS THAN 70 milliGRAM(s)/deciliter  glucagon  Injectable 1 milliGRAM(s) IntraMuscular once PRN Glucose LESS THAN 70 milligrams/deciliter  ALBUTerol    90 MICROgram(s) HFA Inhaler 2 Puff(s) Inhalation every 6 hours PRN Shortness of Breath      Vital Signs Last 24 Hrs  T(C): 36.8 (09-14-17 @ 11:45)  T(F): 98.3 (09-14-17 @ 11:45), Max: 98.4 (09-13-17 @ 19:28)  HR: 80 (09-14-17 @ 15:20) (78 - 80)  BP: 158/80 (09-14-17 @ 15:20)  BP(mean): --  RR: 18 (09-14-17 @ 15:20) (17 - 18)  SpO2: 95% (09-14-17 @ 15:20) (94% - 97%)  Wt(kg): --    09-13 @ 07:01  -  09-14 @ 07:00  --------------------------------------------------------  IN: 840 mL / OUT: 2550 mL / NET: -1710 mL    09-14 @ 07:01  -  09-14 @ 16:11  --------------------------------------------------------  IN: 480 mL / OUT: 150 mL / NET: 330 mL      CAPILLARY BLOOD GLUCOSE  168 (14 Sep 2017 11:05)  171 (14 Sep 2017 07:26)  271 (13 Sep 2017 20:57)  148 (13 Sep 2017 17:09)          PHYSICAL EXAM:  GENERAL: NAD, well-developed  HEAD:  Atraumatic, Normocephalic  EYES: EOMI, PERRLA, conjunctiva and sclera clear  NECK: Supple, No JVD  CHEST/LUNG: Clear to auscultation bilaterally; No wheeze  HEART: Regular rate and rhythm; No murmurs, rubs, or gallops  ABDOMEN: Soft, Nontender, Nondistended; Bowel sounds present  EXTREMITIES:  1+ L DP 2+ other pulses, No clubbing, cyanosis, or edema  PSYCH: AAOx3  NEUROLOGY: non-focal  SKIN: Hematoma at site of impella in L femoral region. No rashes or lesions  TUBES: Shiley R femoral. L arm peripheral IV. James    LABS:    Labs reviewed personally.                        11.4   12.3  )-----------( 285      ( 14 Sep 2017 05:06 )             33.8     Hgb Trend: 11.4<--, 11.3<--  09-14    137  |  92<L>  |  95<H>  ----------------------------<  137<H>  5.3   |  17<L>  |  8.84<H>    Ca    9.5      14 Sep 2017 05:06    Creatinine Trend: 8.84<--, 9.79<--    PT/INR - ( 14 Sep 2017 05:06 )   PT: 14.7 sec;   INR: 1.34 ratio         PTT - ( 14 Sep 2017 05:06 )  PTT:90.9 sec      Imaging reviewed personally.

## 2017-09-14 NOTE — PROGRESS NOTE ADULT - ASSESSMENT
Patient is a 73 yo male transferred from [facility] for NSTEMI. Patient now s/p 5 stent placement, with new onset CHF, LIN on CKD, pyelonephritis.

## 2017-09-14 NOTE — CHART NOTE - NSCHARTNOTEFT_GEN_A_CORE
FEMORAL ANGIOSEAL ASSESSMENT NOTE    s/p impella assisted PCI ( impella was placed via LEFT groin- artery sealed post procedure by Md Portillo)   left groin hematoma noted at seal site, measuring approx 2X2  pt noted to be moving lower extremities often despite being told multiple times to keep left extremity straight   pt is Palestinian speaking ONLY,  services used,  # [id number]  pt very combative during hematoma compression despite understanding rationale for  doing so ( as explained by ) and after speaking to daughter, over the phone, as well     LEFT groin hematoma manually compressed by cath lab nurse Dominique Green with good hemostatis, for approx 10 min  site now w/o any bleeding or hematoma  Pulses in the LEFT lower extremity are palpable, ( weak + 1)  left groin is soft/non tender, + capillary refill   Patient denies leg/s  or foot pain, numbness, tingling,  or chest pain  post 12 lead EKG WNL, no ST or T wave changes noted when compared to pre procedural EKG  Md Homar Portillo at bedside s/p cath, assessed pt and EKG himself   no further intervention to be taken at this time    cardiology consult called to follow up on patient   pt is now under hospitalist service team 4        Comments: right radial band to be take off at 16:30 but cath lab RN FEMORAL ANGIOSEAL ASSESSMENT NOTE    s/p impella assisted PCI ( impella was placed via LEFT groin- artery sealed post procedure by Md Portillo)   left groin hematoma noted at seal site, measuring approx 2X2  pt noted to be moving lower extremities often despite being told multiple times to keep left extremity straight   pt is [place] speaking ONLY,  services used,  # [id number]  pt very combative during hematoma compression despite understanding rationale for  doing so ( as explained by ) and after speaking to daughter, over the phone, as well     LEFT groin hematoma manually compressed by cath lab nurse Dominique Green with good hemostatis, for approx 10 min  site now w/o any bleeding or hematoma  Pulses in the LEFT lower extremity are palpable, ( weak + 1)  left groin is soft/non tender, + capillary refill   Patient denies leg/s  or foot pain, numbness, tingling,  or chest pain        cardiology consult called to follow up on patient   pt is now under hospitalist service team 4        Comments: right radial band to be take off at 16:30 but cath lab RN

## 2017-09-14 NOTE — PROGRESS NOTE ADULT - PROBLEM SELECTOR PROBLEM 4
Pyelonephritis Type 2 diabetes mellitus without complication, unspecified long term insulin use status Essential hypertension

## 2017-09-14 NOTE — PROGRESS NOTE ADULT - PROBLEM SELECTOR PLAN 5
Patient started on ceftriaxone ~9/6 for pyelonephritis.  - c/w ceftriaxone  - ID Patient started on ceftriaxone ~9/6 for pyelonephritis.  - c/w ceftriaxone  - ID following

## 2017-09-14 NOTE — PROGRESS NOTE ADULT - ATTENDING COMMENTS
NSTEMI  s/p LHC w/ BABAR x5 to LAD and RCA  - c/w ASA, plavix  - c/w statin    LIN on CKD, requiring HD  Likely obstructive nephropathy   started on HD  - f/u renal     Pyelonephritis  - c/w CTX

## 2017-09-14 NOTE — CONSULT NOTE ADULT - SUBJECTIVE AND OBJECTIVE BOX
Patient is a 72y old  Male who presents with a chief complaint of s/p cardiac cath (12 Sep 2017 19:27)    HPI:  This is 71 yo male  who is [place]an speaking male with PMH CAD s/p 12-14  stents placed [id number], T2 DM. HTN HLD, CVA 4 years ago with residual left sided facial weaknes, radiculopathy with herniated disc, COPD, CKD creatine 2 months ago 1.2 ( nephrologist is Dr berg [phone]) , bipolar  ( no meds followed by psychiatry) , chronic kidney stones and has had urethral tents in the past who presented to [facility]  with 2 days intractable right flank pain radiating  to the groin with  with chills.. Patient was admitted  with pyelonephritis started on ceftriaxone. 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 Patient is now s/p james making no urine, 2nd dialysis today via femoral catheter for creatine of . Patient treated in the ICU for  NSTEMI with TWI precordial and lateral leads  with increased troponin I  0.120>0.193 . Patient was also started on Heparin gtt, loaded with ASa and plavix, TTE with EF 35 %. He was intubated with hypoxic respiratory distress from pulm edema from the acute NSTEMI as well as treated for  Hypertensive emergency with NTG gtt.  He is now transfered to [facility]  for cardiac angiogram    Hx was obtained from chart and daughter Anisa Lara  [phone] and entered post procedure   renal is dr charles mosquera [phone]  cardiology dr lord (12 Sep 2017 19:23)  While at [facility], he had a negative blood culutre and urine culture and was treated for about 8 days with empiric ceftriaxone.     PAST MEDICAL & SURGICAL HISTORY:  CVA (cerebral vascular accident)  COPD (chronic obstructive pulmonary disease)  BPH (benign prostatic hyperplasia)  Bipolar affective disorder  CKD (chronic kidney disease)  Pancreatitis  Hypertension  Dyslipidemia  Diabetes mellitus  Coronary artery disease  History of cardiac catheterization      Social history:    FAMILY HISTORY:      Allergies    No Known Allergies    Intolerances        Antimicrobials:    cefTRIAXone   IVPB 1 Gram(s) IV Intermittent every 24 hours  cefTRIAXone   IVPB            Vital Signs Last 24 Hrs  T(C): 36.8 (14 Sep 2017 11:45), Max: 36.9 (13 Sep 2017 19:28)  T(F): 98.3 (14 Sep 2017 11:45), Max: 98.4 (13 Sep 2017 19:28)  HR: 80 (14 Sep 2017 14:35) (78 - 84)  BP: 157/84 (14 Sep 2017 14:35) (132/73 - 157/84)  BP(mean): --  RR: 18 (14 Sep 2017 14:35) (17 - 18)  SpO2: 96% (14 Sep 2017 14:35) (94% - 97%)    PHYSICAL EXAM:    was not able to exam the valentinoeitn because he was at procedure during the afternoon.                             11.4   12.3  )-----------( 285      ( 14 Sep 2017 05:06 )             33.8         09-14    137  |  92<L>  |  95<H>  ----------------------------<  137<H>  5.3   |  17<L>  |  8.84<H>    Ca    9.5      14 Sep 2017 05:06        RECENT CULTURES:      MICROBIOLOGY:          Radiology:      Assessment:        Recommendations and Plan:    Pager [phone]  After 5 pm/weekends or if no response :[phone]

## 2017-09-14 NOTE — PROGRESS NOTE ADULT - ASSESSMENT
This is 73 yo male  who is Portuguese speaking male with PMH CAD s/p 12-14  stents placed [id number], T2 DM. HTN HLD, CVA 4 years ago with residual left sided facial weaknes, radiculopathy with herniated disc, COPD, CKD creatine 2 months ago 1.2 ( nephrologist is Dr berg [phone]) , bipolar  ( no meds followed by psychiatry) , chronic kidney stones and has had urethral tents in the past who presented to [facility] hospital  with 2 days intractable right flank pain radiating  to the groin with  with chills.. Patient was admitted  with pyelonephritis started on ceftriaxone. 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 Patient is now s/p james making no urine, 2nd dialysis today via femoral catheter for creatine of . Patient treated in the ICU for  NSTEMI with TWI precordial and lateral leads  with increased troponin I  0.120>0.193 . Patient was also started on Heparin gtt, loaded with ASa and plavix, TTE with EF 35 %. He was intubated with hypoxic respiratory distress from pulm edema from the acute NSTEMI as well as treated for  Hypertensive emergency with NTG gtt.  He is now transfered to [facility]  for cardiac angiogram    Hx was obtained from chart and daughter Anisa Lara  [phone] and entered post procedure   renal is dr charles mosquera [phone]  cardiology dr lord (12 Sep 2017 19:23)

## 2017-09-14 NOTE — CONSULT NOTE ADULT - ASSESSMENT
pt with multiple medical problems including recent AMI has an obstructed kidney from stones and renal insuffiencey . He is having a cath procedure and the family may agree to nephrostomy, although they previously did not want it.  Cultures have been negative and repeat cultures are to be sent. At present he is not hypotensive and it seems reason to continue the ceftriaxone longer until we see if he is going to go for the needed nephrostomy. After the latter ,the antibiotics can be stopped. ID service to cover.

## 2017-09-14 NOTE — CONSULT NOTE ADULT - SUBJECTIVE AND OBJECTIVE BOX
This is 71 yo male  who is [place]isian speaking male with PMH CAD s/p 12-14  stents placed [id number], T2 DM. 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 Patient was admitted  with pyelonephritis started on ceftriaxone. 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 Patient is now s/p james making no urine, 2nd dialysis today via femoral catheter for creatine of . Patient treated in the ICU for  NSTEMI with TWI precordial and lateral leads  with increased troponin I  0.120>0.193 . Patient was also started on Heparin gtt, loaded with ASa and plavix, TTE with EF 35 %. He was intubated with hypoxic respiratory distress from pulm edema from the acute NSTEMI as well as treated for  Hypertensive emergency with NTG gtt.  He is now transfered to [facility]  for cardiac angiogram.    Currently the patient is in Cardiac cath undergoing PCI.  He was extubated once he was transferred to [facility].       PAST MEDICAL & SURGICAL HISTORY:  CVA (cerebral vascular accident)  COPD (chronic obstructive pulmonary disease)  BPH (benign prostatic hyperplasia)  Bipolar affective disorder  CKD (chronic kidney disease)  Pancreatitis  Hypertension  Dyslipidemia  Diabetes mellitus  Coronary artery disease  History of cardiac catheterization    MEDICATIONS  (STANDING):  insulin lispro (HumaLOG) corrective regimen sliding scale   SubCutaneous three times a day before meals  insulin lispro (HumaLOG) corrective regimen sliding scale   SubCutaneous at bedtime  dextrose 5%. 1000 milliLiter(s) (50 mL/Hr) IV Continuous <Continuous>  dextrose 50% Injectable 12.5 Gram(s) IV Push once  dextrose 50% Injectable 25 Gram(s) IV Push once  dextrose 50% Injectable 25 Gram(s) IV Push once  aspirin enteric coated 81 milliGRAM(s) Oral daily  tamsulosin 0.4 milliGRAM(s) Oral at bedtime  isosorbide   mononitrate ER Tablet (IMDUR) 30 milliGRAM(s) Oral daily  ranolazine 1000 milliGRAM(s) Oral two times a day  atorvastatin 40 milliGRAM(s) Oral at bedtime  clopidogrel Tablet 75 milliGRAM(s) Oral daily  heparin  Infusion.  Unit(s)/Hr (10 mL/Hr) IV Continuous <Continuous>  cefTRIAXone   IVPB 1 Gram(s) IV Intermittent every 24 hours  metoprolol 50 milliGRAM(s) Oral every 8 hours  NIFEdipine XL 60 milliGRAM(s) Oral daily  buDESOnide  80 MICROgram(s)/formoterol 4.5 MICROgram(s) Inhaler 2 Puff(s) Inhalation two times a day  sevelamer hydrochloride 800 milliGRAM(s) Oral three times a day with meals  busPIRone 5 milliGRAM(s) Oral two times a day  cefTRIAXone   IVPB      acetylcysteine  Oral Solution 600 milliGRAM(s) Oral two times a day    MEDICATIONS  (PRN):  dextrose Gel 1 Dose(s) Oral once PRN Blood Glucose LESS THAN 70 milliGRAM(s)/deciliter  glucagon  Injectable 1 milliGRAM(s) IntraMuscular once PRN Glucose LESS THAN 70 milligrams/deciliter  heparin  Injectable 5000 Unit(s) IV Push every 6 hours PRN For aPTT less than 40  ALBUTerol    90 MICROgram(s) HFA Inhaler 2 Puff(s) Inhalation every 6 hours PRN Shortness of Breath    FAMILY HISTORY:    Allergies    No Known Allergies      SOCIAL HISTORY:   Tobacco hx: non smoker    REVIEW OF SYSTEMS: Pertinent positives and negatives as stated in HPI, otherwise negative    Vital signs  T(C): 36.8 (09-14-17 @ 11:45), Max: 36.9 (09-13-17 @ 19:28)  HR: 78 (09-14-17 @ 11:45)  BP: 145/89 (09-14-17 @ 11:45)  SpO2: 94% (09-14-17 @ 11:45)  Wt(kg): --    Output  I&O's Summary    13 Sep 2017 07:01  -  14 Sep 2017 07:00  --------------------------------------------------------  IN: 840 mL / OUT: 2550 mL / NET: -1710 mL    14 Sep 2017 07:01  -  14 Sep 2017 12:56  --------------------------------------------------------  IN: 480 mL / OUT: 150 mL / NET: 330 mL      Physical Exam  Gen: NAD  Pulm: No respiratory distress, no subcostal retractions  CV: RRR, no JVD  Abd:  Back:   MSK: No edema present    LABS:          09-14 @ 05:06    WBC 12.3  / Hct 33.8  / SCr 8.84     09-13 @ 07:46    WBC 10.9  / Hct 34.8  / SCr 9.79     09-14    137  |  92<L>  |  95<H>  ----------------------------<  137<H>  5.3   |  17<L>  |  8.84<H>    Ca    9.5      14 Sep 2017 05:06      PT/INR - ( 14 Sep 2017 05:06 )   PT: 14.7 sec;   INR: 1.34 ratio         PTT - ( 14 Sep 2017 05:06 )  PTT:90.9 sec      Urine Cx:     Blood Cx:    RADIOLOGY:  CT Mild Rt hydro, 2 prox stones, 7mm and 8mm  No left hydro, 12mm LUVJ stone This is 71 yo male  who is Vincentian speaking male with PMH CAD s/p 12-14  stents placed [phone], T2 DM. 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 Devlin [phone]) , bipolar (no meds followed by psychiatry) , chronic kidney stones treated with ureteral stents the past who presented to [facility] with 2 days intractable right flank pain radiating  to the groin. Patient was admitted with nitrite positive urine and started on ceftriaxone. He had LIN most likely secondary to post obstructive LIN on CKD but he initially refused james and IR guided nephrostomy. Patient was assessed by IR and Urology at [place] but was deemed too high risk at that time for intervention given his cardiac status. Patient is now s/p james and anuric. He was started on HD by renal secondary to fluid overload and florid renal failure. Just completed 3rd dialysis via femoral catheter. Patient treated in the ICU for NSTEMI with TWI in the precordial and lateral leads with increased troponin I  0.120>0.193 . Patient was also started on Heparin gtt, loaded with ASA and plavix with a TTE with EF 35 %. He was intubated with hypoxic respiratory distress from pulm edema from the acute NSTEMI as well as treated for Hypertensive emergency with NTG gtt.  He is now transferred to [facility]  for cardiac angiogram.    Pt was extubated once he was transferred to [facility].  Currently the patient is in Cardiac cath undergoing PCI and unable to be fully assessed.     PAST MEDICAL & SURGICAL HISTORY:  CVA (cerebral vascular accident)  COPD (chronic obstructive pulmonary disease)  BPH (benign prostatic hyperplasia)  Bipolar affective disorder  CKD (chronic kidney disease)  Pancreatitis  Hypertension  Dyslipidemia  Diabetes mellitus  Coronary artery disease  History of cardiac catheterization    MEDICATIONS  (STANDING):  insulin lispro (HumaLOG) corrective regimen sliding scale   SubCutaneous three times a day before meals  insulin lispro (HumaLOG) corrective regimen sliding scale   SubCutaneous at bedtime  dextrose 5%. 1000 milliLiter(s) (50 mL/Hr) IV Continuous <Continuous>  dextrose 50% Injectable 12.5 Gram(s) IV Push once  dextrose 50% Injectable 25 Gram(s) IV Push once  dextrose 50% Injectable 25 Gram(s) IV Push once  aspirin enteric coated 81 milliGRAM(s) Oral daily  tamsulosin 0.4 milliGRAM(s) Oral at bedtime  isosorbide   mononitrate ER Tablet (IMDUR) 30 milliGRAM(s) Oral daily  ranolazine 1000 milliGRAM(s) Oral two times a day  atorvastatin 40 milliGRAM(s) Oral at bedtime  clopidogrel Tablet 75 milliGRAM(s) Oral daily  heparin  Infusion.  Unit(s)/Hr (10 mL/Hr) IV Continuous <Continuous>  cefTRIAXone   IVPB 1 Gram(s) IV Intermittent every 24 hours  metoprolol 50 milliGRAM(s) Oral every 8 hours  NIFEdipine XL 60 milliGRAM(s) Oral daily  buDESOnide  80 MICROgram(s)/formoterol 4.5 MICROgram(s) Inhaler 2 Puff(s) Inhalation two times a day  sevelamer hydrochloride 800 milliGRAM(s) Oral three times a day with meals  busPIRone 5 milliGRAM(s) Oral two times a day  cefTRIAXone   IVPB      acetylcysteine  Oral Solution 600 milliGRAM(s) Oral two times a day    MEDICATIONS  (PRN):  dextrose Gel 1 Dose(s) Oral once PRN Blood Glucose LESS THAN 70 milliGRAM(s)/deciliter  glucagon  Injectable 1 milliGRAM(s) IntraMuscular once PRN Glucose LESS THAN 70 milligrams/deciliter  heparin  Injectable 5000 Unit(s) IV Push every 6 hours PRN For aPTT less than 40  ALBUTerol    90 MICROgram(s) HFA Inhaler 2 Puff(s) Inhalation every 6 hours PRN Shortness of Breath    FAMILY HISTORY:    Allergies    No Known Allergies      SOCIAL HISTORY:   Tobacco hx: non smoker    REVIEW OF SYSTEMS: Pertinent positives and negatives as stated in HPI, otherwise negative    Vital signs  T(C): 36.8 (09-14-17 @ 11:45), Max: 36.9 (09-13-17 @ 19:28)  HR: 78 (09-14-17 @ 11:45)  BP: 145/89 (09-14-17 @ 11:45)  SpO2: 94% (09-14-17 @ 11:45)  Wt(kg): --    Output  I&O's Summary    13 Sep 2017 07:01  -  14 Sep 2017 07:00  --------------------------------------------------------  IN: 840 mL / OUT: 2550 mL / NET: -1710 mL    14 Sep 2017 07:01  -  14 Sep 2017 12:56  --------------------------------------------------------  IN: 480 mL / OUT: 150 mL / NET: 330 mL      Physical Exam  UNABLE TO PERFORM AT THIS TIME BECAUSE PATIENT BEING TRANSPORTED TO CATH LAB. WILL REASSESS AFTER CATHETERIZATION    LABS:          09-14 @ 05:06    WBC 12.3  / Hct 33.8  / SCr 8.84     09-13 @ 07:46    WBC 10.9  / Hct 34.8  / SCr 9.79     09-14    137  |  92<L>  |  95<H>  ----------------------------<  137<H>  5.3   |  17<L>  |  8.84<H>    Ca    9.5      14 Sep 2017 05:06      PT/INR - ( 14 Sep 2017 05:06 )   PT: 14.7 sec;   INR: 1.34 ratio         PTT - ( 14 Sep 2017 05:06 )  PTT:90.9 sec      Urine Cx:     Blood Cx:    RADIOLOGY:  CT Mild Rt hydro, 2 prox stones, 7mm and 8mm  No left hydro, 12mm LUVJ stone

## 2017-09-14 NOTE — PROGRESS NOTE ADULT - PROBLEM SELECTOR PROBLEM 3
Type 2 diabetes mellitus without complication, unspecified long term insulin use status Essential hypertension Acute kidney injury superimposed on CKD

## 2017-09-14 NOTE — PROGRESS NOTE ADULT - PROBLEM SELECTOR PROBLEM 6
Acute kidney injury superimposed on CKD Type 2 diabetes mellitus without complication, unspecified long term insulin use status

## 2017-09-14 NOTE — CHART NOTE - NSCHARTNOTEFT_GEN_A_CORE
This is 73 yo male  who is Senegalese speaking male with PMH CAD s/p 12-14  stents placed [id number], T2 DM. 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 Patient was admitted  with pyelonephritis started on ceftriaxone. 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 Patient is now s/p james making no urine, 2nd dialysis today via femoral catheter for creatine of . Patient treated in the ICU for  NSTEMI with TWI precordial and lateral leads  with increased troponin I  0.120>0.193 . Patient was also started on Heparin gtt, loaded with ASa and plavix, TTE with EF 35 %. He was intubated with hypoxic respiratory distress from pulm edema from the acute NSTEMI as well as treated for  Hypertensive emergency with NTG gtt.  He is now transfered to [facility]  for cardiac angiogram    9/14 s/p cath via RRA impella assisted via RRA   LAD X2 BABAR and RCA X3 BABAR     ICU Vital Signs Last 24 Hrs  T(C): 36.8 (14 Sep 2017 11:45), Max: 36.9 (13 Sep 2017 19:28)  T(F): 98.3 (14 Sep 2017 11:45), Max: 98.4 (13 Sep 2017 19:28)  HR: 83 (14 Sep 2017 17:20) (78 - 83)  BP: 114/85 (14 Sep 2017 17:20) (114/85 - 164/89)  BP(mean): --  ABP: --  ABP(mean): --  RR: 16 (14 Sep 2017 17:20) (16 - 18)  SpO2: 98% (14 Sep 2017 17:20) (94% - 99%)    MEDICATIONS  (STANDING):  insulin lispro (HumaLOG) corrective regimen sliding scale   SubCutaneous three times a day before meals  insulin lispro (HumaLOG) corrective regimen sliding scale   SubCutaneous at bedtime  dextrose 5%. 1000 milliLiter(s) (50 mL/Hr) IV Continuous <Continuous>  dextrose 50% Injectable 12.5 Gram(s) IV Push once  dextrose 50% Injectable 25 Gram(s) IV Push once  dextrose 50% Injectable 25 Gram(s) IV Push once  aspirin enteric coated 81 milliGRAM(s) Oral daily  tamsulosin 0.4 milliGRAM(s) Oral at bedtime  isosorbide   mononitrate ER Tablet (IMDUR) 30 milliGRAM(s) Oral daily  ranolazine 1000 milliGRAM(s) Oral two times a day  atorvastatin 40 milliGRAM(s) Oral at bedtime  clopidogrel Tablet 75 milliGRAM(s) Oral daily  cefTRIAXone   IVPB 1 Gram(s) IV Intermittent every 24 hours  metoprolol 50 milliGRAM(s) Oral every 8 hours  NIFEdipine XL 60 milliGRAM(s) Oral daily  buDESOnide  80 MICROgram(s)/formoterol 4.5 MICROgram(s) Inhaler 2 Puff(s) Inhalation two times a day  sevelamer hydrochloride 800 milliGRAM(s) Oral three times a day with meals  busPIRone 5 milliGRAM(s) Oral two times a day  cefTRIAXone   IVPB      acetylcysteine  Oral Solution 600 milliGRAM(s) Oral two times a day    MEDICATIONS  (PRN):  dextrose Gel 1 Dose(s) Oral once PRN Blood Glucose LESS THAN 70 milliGRAM(s)/deciliter  glucagon  Injectable 1 milliGRAM(s) IntraMuscular once PRN Glucose LESS THAN 70 milligrams/deciliter  ALBUTerol    90 MICROgram(s) HFA Inhaler 2 Puff(s) Inhalation every 6 hours PRN Shortness of Breath      PROBLEM 1 :CHEST PAIN   9/14 s/p cath via RRA impella assisted via RRA   LAD X2 BABAR and RCA X3 BABAR     Patient developed chest pain while getting cardiac stents in lab  out in [facility] post cath cont to have chest pain. Chest pain is located "across chest", intermittent, non radiating,  8/10  Md Homar Portillo aware, came to assess patient at bed side, post EKG w/o any significant changes post cath when compared to pre cath EKG   Nitroglycerin ointment 2% .5 inch applied to chest.  CP most likely related to "stretching" of arteries s/p X5 stents placement ( as expected)   cont to monitor on tele    Despite Nitroglycerin, pt cont to have Chest pain now 7/10   Md Gumaro Castro called at bed side, (MD is Senegalese speaking)  EKG repeated, as per Md Persits no significant findings noted on EKG  cont to monitor, recommends Fentanyl 25mg IVP X1   Md Graf aware and spoke to Md Castro about findings/plan and agrees with current interventions     Chest pain is currently down to a 4/10. Patient comfortably resting in bed  in no acute distress  left groin benign ( w/o any bleeding or hematoma)  RRA benign ( w/o any bleeding pr hematoma)   pt denies leg/s or arm/wrist pain This is 73 yo male  who is Belgian speaking male with PMH CAD s/p 12-14  stents placed [id number], T2 DM. 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 Patient was admitted  with pyelonephritis started on ceftriaxone. 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 Patient is now s/p james making no urine, 2nd dialysis today via femoral catheter for creatine of . Patient treated in the ICU for  NSTEMI with TWI precordial and lateral leads  with increased troponin I  0.120>0.193 . Patient was also started on Heparin gtt, loaded with ASa and plavix, TTE with EF 35 %. He was intubated with hypoxic respiratory distress from pulm edema from the acute NSTEMI as well as treated for  Hypertensive emergency with NTG gtt.  He is now transfered to [facility]  for cardiac angiogram    9/14 s/p cath via RRA impella assisted via LFA   LAD X2 BABAR and RCA X3 BABAR     ICU Vital Signs Last 24 Hrs  T(C): 36.8 (14 Sep 2017 11:45), Max: 36.9 (13 Sep 2017 19:28)  T(F): 98.3 (14 Sep 2017 11:45), Max: 98.4 (13 Sep 2017 19:28)  HR: 83 (14 Sep 2017 17:20) (78 - 83)  BP: 114/85 (14 Sep 2017 17:20) (114/85 - 164/89)  BP(mean): --  ABP: --  ABP(mean): --  RR: 16 (14 Sep 2017 17:20) (16 - 18)  SpO2: 98% (14 Sep 2017 17:20) (94% - 99%)    MEDICATIONS  (STANDING):  insulin lispro (HumaLOG) corrective regimen sliding scale   SubCutaneous three times a day before meals  insulin lispro (HumaLOG) corrective regimen sliding scale   SubCutaneous at bedtime  dextrose 5%. 1000 milliLiter(s) (50 mL/Hr) IV Continuous <Continuous>  dextrose 50% Injectable 12.5 Gram(s) IV Push once  dextrose 50% Injectable 25 Gram(s) IV Push once  dextrose 50% Injectable 25 Gram(s) IV Push once  aspirin enteric coated 81 milliGRAM(s) Oral daily  tamsulosin 0.4 milliGRAM(s) Oral at bedtime  isosorbide   mononitrate ER Tablet (IMDUR) 30 milliGRAM(s) Oral daily  ranolazine 1000 milliGRAM(s) Oral two times a day  atorvastatin 40 milliGRAM(s) Oral at bedtime  clopidogrel Tablet 75 milliGRAM(s) Oral daily  cefTRIAXone   IVPB 1 Gram(s) IV Intermittent every 24 hours  metoprolol 50 milliGRAM(s) Oral every 8 hours  NIFEdipine XL 60 milliGRAM(s) Oral daily  buDESOnide  80 MICROgram(s)/formoterol 4.5 MICROgram(s) Inhaler 2 Puff(s) Inhalation two times a day  sevelamer hydrochloride 800 milliGRAM(s) Oral three times a day with meals  busPIRone 5 milliGRAM(s) Oral two times a day  cefTRIAXone   IVPB      acetylcysteine  Oral Solution 600 milliGRAM(s) Oral two times a day    MEDICATIONS  (PRN):  dextrose Gel 1 Dose(s) Oral once PRN Blood Glucose LESS THAN 70 milliGRAM(s)/deciliter  glucagon  Injectable 1 milliGRAM(s) IntraMuscular once PRN Glucose LESS THAN 70 milligrams/deciliter  ALBUTerol    90 MICROgram(s) HFA Inhaler 2 Puff(s) Inhalation every 6 hours PRN Shortness of Breath      PROBLEM 1 :CHEST PAIN   9/14 s/p cath via RRA,  impella assisted via LFA   LAD X2 BABAR and RCA X3 BABAR     Patient developed chest pain while getting cardiac stents in lab  out in [facility] post cath cont to have chest pain. Chest pain is located "across chest", intermittent, non radiating,  8/10  Md Homar Portillo aware, came to assess patient at bed side, post EKG w/o any significant changes post cath when compared to pre cath EKG   Nitroglycerin ointment 2% .5 inch applied to chest.  CP most likely related to "stretching" of arteries s/p X5 stents placement ( as expected)   cont to monitor on tele    Despite Nitroglycerin, pt cont to have Chest pain now 7/10   Md Gumaro Castro called at bed side, (MD is Belgian speaking)  EKG repeated, as per Md Hansenits no significant findings noted on EKG  cont to monitor, recommends Fentanyl 25mg IVP X1   Md Graf aware and spoke to Md Castro about findings/plan and agrees with current interventions     Chest pain is currently down to a 4/10. Patient comfortably resting in bed  in no acute distress  left groin benign ( w/o any bleeding or hematoma)  RRA benign ( w/o any bleeding pr hematoma)   pt denies leg/s or arm/wrist pain

## 2017-09-15 DIAGNOSIS — I25.110 ATHEROSCLEROTIC HEART DISEASE OF NATIVE CORONARY ARTERY WITH UNSTABLE ANGINA PECTORIS: ICD-10-CM

## 2017-09-15 DIAGNOSIS — E66.9 OBESITY, UNSPECIFIED: ICD-10-CM

## 2017-09-15 DIAGNOSIS — N18.3 CHRONIC KIDNEY DISEASE, STAGE 3 (MODERATE): ICD-10-CM

## 2017-09-15 DIAGNOSIS — E11.22 TYPE 2 DIABETES MELLITUS WITH DIABETIC CHRONIC KIDNEY DISEASE: ICD-10-CM

## 2017-09-15 DIAGNOSIS — Z95.5 PRESENCE OF CORONARY ANGIOPLASTY IMPLANT AND GRAFT: ICD-10-CM

## 2017-09-15 DIAGNOSIS — I13.0 HYPERTENSIVE HEART AND CHRONIC KIDNEY DISEASE WITH HEART FAILURE AND STAGE 1 THROUGH STAGE 4 CHRONIC KIDNEY DISEASE, OR UNSPECIFIED CHRONIC KIDNEY DISEASE: ICD-10-CM

## 2017-09-15 DIAGNOSIS — J96.01 ACUTE RESPIRATORY FAILURE WITH HYPOXIA: ICD-10-CM

## 2017-09-15 DIAGNOSIS — Z79.84 LONG TERM (CURRENT) USE OF ORAL HYPOGLYCEMIC DRUGS: ICD-10-CM

## 2017-09-15 DIAGNOSIS — Z79.02 LONG TERM (CURRENT) USE OF ANTITHROMBOTICS/ANTIPLATELETS: ICD-10-CM

## 2017-09-15 DIAGNOSIS — N13.6 PYONEPHROSIS: ICD-10-CM

## 2017-09-15 DIAGNOSIS — D72.829 ELEVATED WHITE BLOOD CELL COUNT, UNSPECIFIED: ICD-10-CM

## 2017-09-15 DIAGNOSIS — R31.9 HEMATURIA, UNSPECIFIED: ICD-10-CM

## 2017-09-15 DIAGNOSIS — E83.39 OTHER DISORDERS OF PHOSPHORUS METABOLISM: ICD-10-CM

## 2017-09-15 DIAGNOSIS — J44.9 CHRONIC OBSTRUCTIVE PULMONARY DISEASE, UNSPECIFIED: ICD-10-CM

## 2017-09-15 DIAGNOSIS — I69.392 FACIAL WEAKNESS FOLLOWING CEREBRAL INFARCTION: ICD-10-CM

## 2017-09-15 DIAGNOSIS — I16.1 HYPERTENSIVE EMERGENCY: ICD-10-CM

## 2017-09-15 DIAGNOSIS — I21.4 NON-ST ELEVATION (NSTEMI) MYOCARDIAL INFARCTION: ICD-10-CM

## 2017-09-15 DIAGNOSIS — F31.9 BIPOLAR DISORDER, UNSPECIFIED: ICD-10-CM

## 2017-09-15 DIAGNOSIS — N17.0 ACUTE KIDNEY FAILURE WITH TUBULAR NECROSIS: ICD-10-CM

## 2017-09-15 DIAGNOSIS — Z87.442 PERSONAL HISTORY OF URINARY CALCULI: ICD-10-CM

## 2017-09-15 DIAGNOSIS — I50.21 ACUTE SYSTOLIC (CONGESTIVE) HEART FAILURE: ICD-10-CM

## 2017-09-15 DIAGNOSIS — E87.2 ACIDOSIS: ICD-10-CM

## 2017-09-15 DIAGNOSIS — E78.5 HYPERLIPIDEMIA, UNSPECIFIED: ICD-10-CM

## 2017-09-15 LAB
ALBUMIN SERPL ELPH-MCNC: 3.3 G/DL — SIGNIFICANT CHANGE UP (ref 3.3–5)
ALP SERPL-CCNC: 110 U/L — SIGNIFICANT CHANGE UP (ref 40–120)
ALT FLD-CCNC: 28 U/L RC — SIGNIFICANT CHANGE UP (ref 10–45)
ANION GAP SERPL CALC-SCNC: 22 MMOL/L — HIGH (ref 5–17)
AST SERPL-CCNC: 36 U/L — SIGNIFICANT CHANGE UP (ref 10–40)
BILIRUB SERPL-MCNC: 0.4 MG/DL — SIGNIFICANT CHANGE UP (ref 0.2–1.2)
BUN SERPL-MCNC: 83 MG/DL — HIGH (ref 7–23)
CALCIUM SERPL-MCNC: 9 MG/DL — SIGNIFICANT CHANGE UP (ref 8.4–10.5)
CHLORIDE SERPL-SCNC: 91 MMOL/L — LOW (ref 96–108)
CK MB CFR SERPL CALC: 4.7 NG/ML — SIGNIFICANT CHANGE UP (ref 0–6.7)
CK SERPL-CCNC: 85 U/L — SIGNIFICANT CHANGE UP (ref 30–200)
CO2 SERPL-SCNC: 20 MMOL/L — LOW (ref 22–31)
CREAT SERPL-MCNC: 7.96 MG/DL — HIGH (ref 0.5–1.3)
GLUCOSE SERPL-MCNC: 178 MG/DL — HIGH (ref 70–99)
HCT VFR BLD CALC: 30.4 % — LOW (ref 39–50)
HGB BLD-MCNC: 10.4 G/DL — LOW (ref 13–17)
MAGNESIUM SERPL-MCNC: 2.5 MG/DL — SIGNIFICANT CHANGE UP (ref 1.6–2.6)
MCHC RBC-ENTMCNC: 29 PG — SIGNIFICANT CHANGE UP (ref 27–34)
MCHC RBC-ENTMCNC: 34.2 GM/DL — SIGNIFICANT CHANGE UP (ref 32–36)
MCV RBC AUTO: 85 FL — SIGNIFICANT CHANGE UP (ref 80–100)
PHOSPHATE SERPL-MCNC: 8.4 MG/DL — HIGH (ref 2.5–4.5)
PLATELET # BLD AUTO: 299 K/UL — SIGNIFICANT CHANGE UP (ref 150–400)
POTASSIUM SERPL-MCNC: 5 MMOL/L — SIGNIFICANT CHANGE UP (ref 3.5–5.3)
POTASSIUM SERPL-SCNC: 5 MMOL/L — SIGNIFICANT CHANGE UP (ref 3.5–5.3)
PROT SERPL-MCNC: 7.5 G/DL — SIGNIFICANT CHANGE UP (ref 6–8.3)
RBC # BLD: 3.58 M/UL — LOW (ref 4.2–5.8)
RBC # FLD: 12.2 % — SIGNIFICANT CHANGE UP (ref 10.3–14.5)
SODIUM SERPL-SCNC: 133 MMOL/L — LOW (ref 135–145)
TROPONIN T SERPL-MCNC: 2.85 NG/ML — HIGH (ref 0–0.06)
WBC # BLD: 8.3 K/UL — SIGNIFICANT CHANGE UP (ref 3.8–10.5)
WBC # FLD AUTO: 8.3 K/UL — SIGNIFICANT CHANGE UP (ref 3.8–10.5)

## 2017-09-15 PROCEDURE — 93010 ELECTROCARDIOGRAM REPORT: CPT

## 2017-09-15 PROCEDURE — 99232 SBSQ HOSP IP/OBS MODERATE 35: CPT

## 2017-09-15 PROCEDURE — 99233 SBSQ HOSP IP/OBS HIGH 50: CPT

## 2017-09-15 PROCEDURE — 99233 SBSQ HOSP IP/OBS HIGH 50: CPT | Mod: GC

## 2017-09-15 PROCEDURE — 74176 CT ABD & PELVIS W/O CONTRAST: CPT | Mod: 26

## 2017-09-15 RX ORDER — METOPROLOL TARTRATE 50 MG
150 TABLET ORAL DAILY
Qty: 0 | Refills: 0 | Status: DISCONTINUED | OUTPATIENT
Start: 2017-09-16 | End: 2017-09-22

## 2017-09-15 RX ORDER — ATORVASTATIN CALCIUM 80 MG/1
80 TABLET, FILM COATED ORAL AT BEDTIME
Qty: 0 | Refills: 0 | Status: DISCONTINUED | OUTPATIENT
Start: 2017-09-15 | End: 2017-09-22

## 2017-09-15 RX ORDER — METOPROLOL TARTRATE 50 MG
50 TABLET ORAL
Qty: 0 | Refills: 0 | Status: COMPLETED | OUTPATIENT
Start: 2017-09-15 | End: 2017-09-15

## 2017-09-15 RX ORDER — HEPARIN SODIUM 5000 [USP'U]/ML
5000 INJECTION INTRAVENOUS; SUBCUTANEOUS EVERY 8 HOURS
Qty: 0 | Refills: 0 | Status: DISCONTINUED | OUTPATIENT
Start: 2017-09-15 | End: 2017-09-20

## 2017-09-15 RX ORDER — METOPROLOL TARTRATE 50 MG
50 TABLET ORAL EVERY 8 HOURS
Qty: 0 | Refills: 0 | Status: DISCONTINUED | OUTPATIENT
Start: 2017-09-15 | End: 2017-09-15

## 2017-09-15 RX ORDER — METOPROLOL TARTRATE 50 MG
150 TABLET ORAL DAILY
Qty: 0 | Refills: 0 | Status: DISCONTINUED | OUTPATIENT
Start: 2017-09-15 | End: 2017-09-15

## 2017-09-15 RX ORDER — ACETAMINOPHEN 500 MG
650 TABLET ORAL EVERY 6 HOURS
Qty: 0 | Refills: 0 | Status: DISCONTINUED | OUTPATIENT
Start: 2017-09-15 | End: 2017-09-22

## 2017-09-15 RX ORDER — OXYCODONE HYDROCHLORIDE 5 MG/1
5 TABLET ORAL EVERY 4 HOURS
Qty: 0 | Refills: 0 | Status: DISCONTINUED | OUTPATIENT
Start: 2017-09-15 | End: 2017-09-20

## 2017-09-15 RX ADMIN — RANOLAZINE 1000 MILLIGRAM(S): 500 TABLET, FILM COATED, EXTENDED RELEASE ORAL at 05:29

## 2017-09-15 RX ADMIN — Medication 5 MILLIGRAM(S): at 06:13

## 2017-09-15 RX ADMIN — Medication 81 MILLIGRAM(S): at 08:11

## 2017-09-15 RX ADMIN — Medication 650 MILLIGRAM(S): at 16:18

## 2017-09-15 RX ADMIN — OXYCODONE HYDROCHLORIDE 5 MILLIGRAM(S): 5 TABLET ORAL at 21:37

## 2017-09-15 RX ADMIN — Medication 650 MILLIGRAM(S): at 14:34

## 2017-09-15 RX ADMIN — SEVELAMER CARBONATE 800 MILLIGRAM(S): 2400 POWDER, FOR SUSPENSION ORAL at 16:54

## 2017-09-15 RX ADMIN — BUDESONIDE AND FORMOTEROL FUMARATE DIHYDRATE 2 PUFF(S): 160; 4.5 AEROSOL RESPIRATORY (INHALATION) at 16:56

## 2017-09-15 RX ADMIN — OXYCODONE HYDROCHLORIDE 5 MILLIGRAM(S): 5 TABLET ORAL at 21:08

## 2017-09-15 RX ADMIN — Medication 50 MILLIGRAM(S): at 23:24

## 2017-09-15 RX ADMIN — Medication 1: at 11:59

## 2017-09-15 RX ADMIN — Medication 5 MILLIGRAM(S): at 16:55

## 2017-09-15 RX ADMIN — Medication 0.5 INCH(S): at 02:30

## 2017-09-15 RX ADMIN — CEFTRIAXONE 100 GRAM(S): 500 INJECTION, POWDER, FOR SOLUTION INTRAMUSCULAR; INTRAVENOUS at 23:24

## 2017-09-15 RX ADMIN — HEPARIN SODIUM 5000 UNIT(S): 5000 INJECTION INTRAVENOUS; SUBCUTANEOUS at 23:24

## 2017-09-15 RX ADMIN — Medication 1: at 08:07

## 2017-09-15 RX ADMIN — CLOPIDOGREL BISULFATE 75 MILLIGRAM(S): 75 TABLET, FILM COATED ORAL at 08:11

## 2017-09-15 RX ADMIN — Medication 50 MILLIGRAM(S): at 14:50

## 2017-09-15 RX ADMIN — RANOLAZINE 1000 MILLIGRAM(S): 500 TABLET, FILM COATED, EXTENDED RELEASE ORAL at 16:55

## 2017-09-15 RX ADMIN — BUDESONIDE AND FORMOTEROL FUMARATE DIHYDRATE 2 PUFF(S): 160; 4.5 AEROSOL RESPIRATORY (INHALATION) at 05:30

## 2017-09-15 RX ADMIN — Medication 600 MILLIGRAM(S): at 05:27

## 2017-09-15 RX ADMIN — SEVELAMER CARBONATE 800 MILLIGRAM(S): 2400 POWDER, FOR SUSPENSION ORAL at 08:08

## 2017-09-15 RX ADMIN — TAMSULOSIN HYDROCHLORIDE 0.4 MILLIGRAM(S): 0.4 CAPSULE ORAL at 23:24

## 2017-09-15 RX ADMIN — Medication 50 MILLIGRAM(S): at 05:27

## 2017-09-15 RX ADMIN — Medication 2: at 16:52

## 2017-09-15 RX ADMIN — Medication 60 MILLIGRAM(S): at 05:27

## 2017-09-15 RX ADMIN — ISOSORBIDE MONONITRATE 30 MILLIGRAM(S): 60 TABLET, EXTENDED RELEASE ORAL at 08:10

## 2017-09-15 RX ADMIN — SEVELAMER CARBONATE 800 MILLIGRAM(S): 2400 POWDER, FOR SUSPENSION ORAL at 11:57

## 2017-09-15 RX ADMIN — Medication 600 MILLIGRAM(S): at 23:24

## 2017-09-15 RX ADMIN — ATORVASTATIN CALCIUM 80 MILLIGRAM(S): 80 TABLET, FILM COATED ORAL at 23:24

## 2017-09-15 NOTE — PROGRESS NOTE ADULT - PROBLEM SELECTOR PLAN 7
Patient with h/o COPD  - symbicort BID  - albuterol PRN  - nasal cannula O2 PRN Patient with h/o COPD  - Symbicort BID  - albuterol PRN  - nasal cannula O2 PRN

## 2017-09-15 NOTE — PROGRESS NOTE ADULT - PROBLEM SELECTOR PLAN 4
Patient with h/o HTN  - c/w lopressor, procardia Patient with h/o HTN  - c/w procardia  - metoprolol succ 150

## 2017-09-15 NOTE — PROGRESS NOTE ADULT - ASSESSMENT
71 y/o M with Right functional solitary kidney now obstructed with ureteral stone c/b renal failure requiring initiation of hemodialysis,  new NSTEMI s/p cardiac cath with 5 stents.    --patient not a candidate for nephrostomy tube given he is on plavix  --currently still very high risk for general anesthesia with severe cardiac disease, on anticoagulation  --would continue to optimize medically, continue flomax, strict I/Os, dialysis as needed  --if able to be optimized for OR, will consider ureteral stent insertion on this admission  --d/w Dr. Yanes

## 2017-09-15 NOTE — PROGRESS NOTE ADULT - ASSESSMENT
72 white male with a history of DM, HTN, CAD, CHF, CAD, CVA, RT hydro, S/P james.  S/P MI, S/P cath with BABAR x 5 in LAD and RCA.   note appreciated, patient needs PCN to relieve pressure in the right kidney.  Also needs a perma cath or change over the dialysis catheter into the jugular vein.  in the meanwhile will continue maintenance dialysis.    and Cardiology to decide on the further course of action in consultation with the family.   spoke to family at bed side.

## 2017-09-15 NOTE — PROGRESS NOTE ADULT - PROBLEM SELECTOR PLAN 5
Patient started on ceftriaxone ~9/6 for pyelonephritis.  - c/w ceftriaxone  - ID following Patient started on ceftriaxone ~9/6 for pyelonephritis. Day 10/14 today.  - c/w ceftriaxone  - ID following

## 2017-09-15 NOTE — PROGRESS NOTE ADULT - PROBLEM SELECTOR PLAN 1
PMH CAD w/ multiple stents. Diagnostic cath 9/12 diffuse 99% stenosis in mid LAD, 30% stenosis in mid PCX, 80% stenosis in mid RCA, 85% distal RCA. Therapeutic cath 9/14 - 2 stents in LAD, 3 in RCA.  - c/w ASA, plavix, lipitor  - c/w ranexa, imdur  - c/w procardia xl  - followed by cards PMH CAD w/ multiple stents. Diagnostic cath 9/12 diffuse 99% stenosis in mid LAD, 30% stenosis in mid PCX, 80% stenosis in mid RCA, 85% distal RCA. Therapeutic cath 9/14 - 2 stents in LAD, 3 in RCA.  - c/w ASA, plavix  - increased lipitor from 40 to 80  - c/w ranexa, imdur  - c/w procardia xl  - followed by cards  - daily EKG

## 2017-09-15 NOTE — PROGRESS NOTE ADULT - ATTENDING COMMENTS
Sean Gallego  Attending Physician   Division of Infectious Disease  Pager #[phone]  After 5pm/weekend #[phone]    Please call the ID service [phone] with questions or concerns over the weekend.

## 2017-09-15 NOTE — PROGRESS NOTE ADULT - PROBLEM SELECTOR PLAN 2
Pt. w/o PMH CHF. Echo showing EF 35%. Mild-moderate MR, moderate AS, mild LV enlargement, eccentric LV hypertrophy (dilated LV w/ nl relative wall thickness). Mod-severe segmental LV sys dysfunction. The mid to distal septum, mid to distal inferior, apical cap akinetic. Nl RV size & sys fxn  - c/w metoprolol 40 q8 Pt. w/o PMH CHF. Echo showing EF 35%. Mild-moderate MR, moderate AS, mild LV enlargement, eccentric LV hypertrophy (dilated LV w/ nl relative wall thickness). Mod-severe segmental LV sys dysfunction. The mid to distal septum, mid to distal inferior, apical cap akinetic. Nl RV size & sys fxn  - change metoprolol 40 q8 to metoprolol succ 150 - will titrate w/ BPs

## 2017-09-15 NOTE — PROGRESS NOTE ADULT - ASSESSMENT
71 yo male transferred from [facility] for NSTEMI. Patient now s/p 5 stent placement, with new onset CHF, LIN on CKD, pyelonephritis.

## 2017-09-15 NOTE — PROGRESS NOTE ADULT - PROBLEM SELECTOR PLAN 3
LIN on CKD in setting of L kidney atrophy (on US) and obstructed R kidney with hydro. Cr 2 mo ago 1.2. 9.8 on admission to [facility]. Patient began HD at [facility]. HD#3 9/13 am.   - HD tonight or tomorrow morning  - pull groin shiley HD cath after next HD   - will talk to IR in morning about placing new shiley & possible percutaneous nephrostomy  - currently with indwelling james LIN on CKD in setting of L kidney atrophy (on US) and obstructed R kidney with hydro. Cr 2 mo ago 1.2. 9.8 on admission to [facility]. Patient began HD at [facility]. HD#4 9/14.   - IR will not put in new HD cath at this time as patient is bleeding risk  - also with high bleeding risk for percutaneous nephrostomy  - currently with indwelling james  - tylenol for pain PRN

## 2017-09-15 NOTE — PROGRESS NOTE ADULT - SUBJECTIVE AND OBJECTIVE BOX
Subjective    Patient seen and examined. S/P cardiac cath with stent yesterday. Transferred to floor. Complains of R flank pain. No n/v. No fevers. Tolerating diet.     Objective    Vital signs  T(F): , Max: 98.6 (09-14-17 @ 21:25)  HR: 79 (09-15-17 @ 11:44)  BP: 136/80 (09-15-17 @ 11:44)  SpO2: 98% (09-15-17 @ 11:44)  Wt(kg): --    Output     09-14 @ 07:01  -  09-15 @ 07:00  --------------------------------------------------------  IN: 1760 mL / OUT: 2100 mL / NET: -340 mL    09-15 @ 07:01  -  09-15 @ 17:06  --------------------------------------------------------  IN: 720 mL / OUT: 0 mL / NET: 720 mL        General: NAD  Abdomen: soft/non-tender/non-distended      Labs      09-15 @ 05:33    WBC 8.3   / Hct 30.4  / SCr 7.96     09-14 @ 05:06    WBC 12.3  / Hct 33.8  / SCr 8.84

## 2017-09-15 NOTE — PROGRESS NOTE ADULT - SUBJECTIVE AND OBJECTIVE BOX
Patient is a 72y old  Male who presents with a chief complaint of s/p cardiac cath (12 Sep 2017 19:27)      SUBJECTIVE / OVERNIGHT EVENTS: No acute events overnight. Patient denies F/C, HA, CP, SOB, abdominal pain, N/V/D/C.     MEDICATIONS  (STANDING):  insulin lispro (HumaLOG) corrective regimen sliding scale   SubCutaneous three times a day before meals  insulin lispro (HumaLOG) corrective regimen sliding scale   SubCutaneous at bedtime  dextrose 5%. 1000 milliLiter(s) (50 mL/Hr) IV Continuous <Continuous>  dextrose 50% Injectable 12.5 Gram(s) IV Push once  dextrose 50% Injectable 25 Gram(s) IV Push once  dextrose 50% Injectable 25 Gram(s) IV Push once  aspirin enteric coated 81 milliGRAM(s) Oral daily  tamsulosin 0.4 milliGRAM(s) Oral at bedtime  isosorbide   mononitrate ER Tablet (IMDUR) 30 milliGRAM(s) Oral daily  ranolazine 1000 milliGRAM(s) Oral two times a day  atorvastatin 40 milliGRAM(s) Oral at bedtime  clopidogrel Tablet 75 milliGRAM(s) Oral daily  cefTRIAXone   IVPB 1 Gram(s) IV Intermittent every 24 hours  metoprolol 50 milliGRAM(s) Oral every 8 hours  NIFEdipine XL 60 milliGRAM(s) Oral daily  buDESOnide  80 MICROgram(s)/formoterol 4.5 MICROgram(s) Inhaler 2 Puff(s) Inhalation two times a day  sevelamer hydrochloride 800 milliGRAM(s) Oral three times a day with meals  busPIRone 5 milliGRAM(s) Oral two times a day  cefTRIAXone   IVPB      acetylcysteine  Oral Solution 600 milliGRAM(s) Oral two times a day    MEDICATIONS  (PRN):  dextrose Gel 1 Dose(s) Oral once PRN Blood Glucose LESS THAN 70 milliGRAM(s)/deciliter  glucagon  Injectable 1 milliGRAM(s) IntraMuscular once PRN Glucose LESS THAN 70 milligrams/deciliter  ALBUTerol    90 MICROgram(s) HFA Inhaler 2 Puff(s) Inhalation every 6 hours PRN Shortness of Breath      T(F): 97.9 (09-15 @ 04:38), Max: 98.6 (09-14 @ 21:25)  HR: 85 (09-15 @ 04:38) (77 - 85)  BP: 133/75 (09-15 @ 04:38) (104/66 - 164/89)  RR: 18 (09-15 @ 04:38) (16 - 18)  SpO2: 97% (09-15 @ 04:38) (94% - 99%)    CAPILLARY BLOOD GLUCOSE  177 (15 Sep 2017 07:22)  274 (14 Sep 2017 22:44)  148 (14 Sep 2017 17:16)  168 (14 Sep 2017 11:05)  171 (14 Sep 2017 07:26)          I&O's Summary    14 Sep 2017 07:01  -  15 Sep 2017 07:00  --------------------------------------------------------  IN: 1760 mL / OUT: 2100 mL / NET: -340 mL        PHYSICAL EXAM:  GEN: Appears in no acute distress  HEENT: PERRLA, EOMI and accommodate, neck supple, no lymphadenopathy, no JVD  MOUTH: moist mucous membranes, no exudates or lesions   PULM: Clear to auscultation bilaterally, no wheezes, rales, rhonchi  CV: Regular rate and rhythm, S1S2, no murmurs, rubs or gallops  ABD: Soft, nontender to palpation, non-distended, normoactive bowel sounds  EXTREMITIES: No edema, + peripheral pulses  NEURO: AAOx3, moving all four extremities spontaneously  SKIN: No rashes, lesions, hematomas, ecchymosis    LABS & IMAGING:  Labs personally reviewed [X]                        10.4   8.3   )-----------( 299      ( 15 Sep 2017 05:33 )             30.4     Hgb Trend: 10.4<--, 11.4<--, 11.3<--    09-15    133<L>  |  91<L>  |  83<H>  ----------------------------<  178<H>  5.0   |  20<L>  |  7.96<H>    Ca    9.0      15 Sep 2017 05:33    TPro  7.5  /  Alb  3.3  /  TBili  0.4  /  DBili  x   /  AST  36  /  ALT  28  /  AlkPhos  110  09-15    Creatinine Trend: 7.96<--, 8.84<--, 9.79<--    PT/INR - ( 14 Sep 2017 05:06 )   PT: 14.7 sec;   INR: 1.34 ratio         PTT - ( 14 Sep 2017 05:06 )  PTT:90.9 sec Patient is a 72y old  Male who presents with a chief complaint of s/p cardiac cath (12 Sep 2017 19:27)      SUBJECTIVE / OVERNIGHT EVENTS: No acute events overnight. Patient endorses R flank pain that has been long-standing. He has some residual chest pain that is currently not bothering him. He denies SOB, abdominal pain, N/V/D/C.     Tele: NSV 70s-90s.     MEDICATIONS  (STANDING):  insulin lispro (HumaLOG) corrective regimen sliding scale   SubCutaneous three times a day before meals  insulin lispro (HumaLOG) corrective regimen sliding scale   SubCutaneous at bedtime  dextrose 5%. 1000 milliLiter(s) (50 mL/Hr) IV Continuous <Continuous>  dextrose 50% Injectable 12.5 Gram(s) IV Push once  dextrose 50% Injectable 25 Gram(s) IV Push once  dextrose 50% Injectable 25 Gram(s) IV Push once  aspirin enteric coated 81 milliGRAM(s) Oral daily  tamsulosin 0.4 milliGRAM(s) Oral at bedtime  isosorbide   mononitrate ER Tablet (IMDUR) 30 milliGRAM(s) Oral daily  ranolazine 1000 milliGRAM(s) Oral two times a day  atorvastatin 40 milliGRAM(s) Oral at bedtime  clopidogrel Tablet 75 milliGRAM(s) Oral daily  cefTRIAXone   IVPB 1 Gram(s) IV Intermittent every 24 hours  metoprolol 50 milliGRAM(s) Oral every 8 hours  NIFEdipine XL 60 milliGRAM(s) Oral daily  buDESOnide  80 MICROgram(s)/formoterol 4.5 MICROgram(s) Inhaler 2 Puff(s) Inhalation two times a day  sevelamer hydrochloride 800 milliGRAM(s) Oral three times a day with meals  busPIRone 5 milliGRAM(s) Oral two times a day  cefTRIAXone   IVPB      acetylcysteine  Oral Solution 600 milliGRAM(s) Oral two times a day    MEDICATIONS  (PRN):  dextrose Gel 1 Dose(s) Oral once PRN Blood Glucose LESS THAN 70 milliGRAM(s)/deciliter  glucagon  Injectable 1 milliGRAM(s) IntraMuscular once PRN Glucose LESS THAN 70 milligrams/deciliter  ALBUTerol    90 MICROgram(s) HFA Inhaler 2 Puff(s) Inhalation every 6 hours PRN Shortness of Breath      T(F): 97.9 (09-15 @ 04:38), Max: 98.6 (09-14 @ 21:25)  HR: 85 (09-15 @ 04:38) (77 - 85)  BP: 133/75 (09-15 @ 04:38) (104/66 - 164/89)  RR: 18 (09-15 @ 04:38) (16 - 18)  SpO2: 97% (09-15 @ 04:38) (94% - 99%)    CAPILLARY BLOOD GLUCOSE  177 (15 Sep 2017 07:22)  274 (14 Sep 2017 22:44)  148 (14 Sep 2017 17:16)  168 (14 Sep 2017 11:05)  171 (14 Sep 2017 07:26)          I&O's Summary    14 Sep 2017 07:01  -  15 Sep 2017 07:00  --------------------------------------------------------  IN: 1760 mL / OUT: 2100 mL / NET: -340 mL        PHYSICAL EXAM:  GEN: Appears in no acute distress  HEENT: PERRLA, EOMI and accommodate, neck supple, no lymphadenopathy, no JVD  MOUTH: moist mucous membranes, no exudates or lesions   PULM: Clear to auscultation bilaterally, no wheezes, rales, rhonchi  CV: Regular rate and rhythm, S1S2, no murmurs, rubs or gallops  ABD: Soft, nontender to palpation, non-distended, normoactive bowel sounds  EXTREMITIES: No edema, + peripheral pulses  NEURO: AAOx3, moving all four extremities spontaneously  SKIN: No rashes, lesions, hematomas, ecchymosis    LABS & IMAGING:  Labs personally reviewed [X]                        10.4   8.3   )-----------( 299      ( 15 Sep 2017 05:33 )             30.4     Hgb Trend: 10.4<--, 11.4<--, 11.3<--    09-15    133<L>  |  91<L>  |  83<H>  ----------------------------<  178<H>  5.0   |  20<L>  |  7.96<H>    Ca    9.0      15 Sep 2017 05:33    TPro  7.5  /  Alb  3.3  /  TBili  0.4  /  DBili  x   /  AST  36  /  ALT  28  /  AlkPhos  110  09-15    Creatinine Trend: 7.96<--, 8.84<--, 9.79<--    PT/INR - ( 14 Sep 2017 05:06 )   PT: 14.7 sec;   INR: 1.34 ratio         PTT - ( 14 Sep 2017 05:06 )  PTT:90.9 sec Patient is a 72y old  Male who presents with a chief complaint of s/p cardiac cath (12 Sep 2017 19:27)      SUBJECTIVE / OVERNIGHT EVENTS: No acute events overnight. Patient endorses R flank pain that has been long-standing. He has some residual chest pain that is currently not bothering him. He denies SOB, abdominal pain, N/V/D/C.     Tele: NSV 70s-90s.     MEDICATIONS  (STANDING):  insulin lispro (HumaLOG) corrective regimen sliding scale   SubCutaneous three times a day before meals  insulin lispro (HumaLOG) corrective regimen sliding scale   SubCutaneous at bedtime  dextrose 5%. 1000 milliLiter(s) (50 mL/Hr) IV Continuous <Continuous>  dextrose 50% Injectable 12.5 Gram(s) IV Push once  dextrose 50% Injectable 25 Gram(s) IV Push once  dextrose 50% Injectable 25 Gram(s) IV Push once  aspirin enteric coated 81 milliGRAM(s) Oral daily  tamsulosin 0.4 milliGRAM(s) Oral at bedtime  isosorbide   mononitrate ER Tablet (IMDUR) 30 milliGRAM(s) Oral daily  ranolazine 1000 milliGRAM(s) Oral two times a day  atorvastatin 40 milliGRAM(s) Oral at bedtime  clopidogrel Tablet 75 milliGRAM(s) Oral daily  cefTRIAXone   IVPB 1 Gram(s) IV Intermittent every 24 hours  metoprolol 50 milliGRAM(s) Oral every 8 hours  NIFEdipine XL 60 milliGRAM(s) Oral daily  buDESOnide  80 MICROgram(s)/formoterol 4.5 MICROgram(s) Inhaler 2 Puff(s) Inhalation two times a day  sevelamer hydrochloride 800 milliGRAM(s) Oral three times a day with meals  busPIRone 5 milliGRAM(s) Oral two times a day  cefTRIAXone   IVPB      acetylcysteine  Oral Solution 600 milliGRAM(s) Oral two times a day    MEDICATIONS  (PRN):  dextrose Gel 1 Dose(s) Oral once PRN Blood Glucose LESS THAN 70 milliGRAM(s)/deciliter  glucagon  Injectable 1 milliGRAM(s) IntraMuscular once PRN Glucose LESS THAN 70 milligrams/deciliter  ALBUTerol    90 MICROgram(s) HFA Inhaler 2 Puff(s) Inhalation every 6 hours PRN Shortness of Breath      T(F): 97.9 (09-15 @ 04:38), Max: 98.6 (09-14 @ 21:25)  HR: 85 (09-15 @ 04:38) (77 - 85)  BP: 133/75 (09-15 @ 04:38) (104/66 - 164/89)  RR: 18 (09-15 @ 04:38) (16 - 18)  SpO2: 97% (09-15 @ 04:38) (94% - 99%)    CAPILLARY BLOOD GLUCOSE  177 (15 Sep 2017 07:22)  274 (14 Sep 2017 22:44)  148 (14 Sep 2017 17:16)  168 (14 Sep 2017 11:05)  171 (14 Sep 2017 07:26)    I&O's Summary    14 Sep 2017 07:01  -  15 Sep 2017 07:00  --------------------------------------------------------  IN: 1760 mL / OUT: 2100 mL / NET: -340 mL    PHYSICAL EXAM:  GEN: Appears in no acute distress  HEENT: PERRLA, EOMI and accommodate, neck supple, no lymphadenopathy, no JVD  MOUTH: moist mucous membranes, no exudates or lesions   PULM: Clear to auscultation bilaterally, no wheezes, rales, rhonchi  CV: distant heart sounds, Regular rate and rhythm, S1S2, no murmurs, rubs or gallops  ABD: Soft, nontender to palpation, non-distended, normoactive bowel sounds  EXTREMITIES: No edema, + peripheral pulses  NEURO: AAOx3, moving all four extremities spontaneously  SKIN: No rashes, lesions, hematomas, ecchymosis  TUBES: L peripheral IV, james draining clear yellow urine low volume, shiley R femoral    LABS & IMAGING:  Labs personally reviewed [X]                        10.4   8.3   )-----------( 299      ( 15 Sep 2017 05:33 )             30.4     Hgb Trend: 10.4<--, 11.4<--, 11.3<--    09-15    133<L>  |  91<L>  |  83<H>  ----------------------------<  178<H>  5.0   |  20<L>  |  7.96<H>    Ca    9.0      15 Sep 2017 05:33    TPro  7.5  /  Alb  3.3  /  TBili  0.4  /  DBili  x   /  AST  36  /  ALT  28  /  AlkPhos  110  09-15    Creatinine Trend: 7.96<--, 8.84<--, 9.79<--    PT/INR - ( 14 Sep 2017 05:06 )   PT: 14.7 sec;   INR: 1.34 ratio         PTT - ( 14 Sep 2017 05:06 )  PTT:90.9 sec Patient is a 72y old  Male who presents with a chief complaint of s/p cardiac cath (12 Sep 2017 19:27)      SUBJECTIVE / OVERNIGHT EVENTS: No acute events overnight. Patient endorses R flank pain that has been long-standing. He has some residual chest pain that is currently not bothering him. He denies SOB, abdominal pain, N/V/D/C.     Tele: NSV 70s-90s.     MEDICATIONS  (STANDING):  insulin lispro (HumaLOG) corrective regimen sliding scale   SubCutaneous three times a day before meals  insulin lispro (HumaLOG) corrective regimen sliding scale   SubCutaneous at bedtime  dextrose 5%. 1000 milliLiter(s) (50 mL/Hr) IV Continuous <Continuous>  dextrose 50% Injectable 12.5 Gram(s) IV Push once  dextrose 50% Injectable 25 Gram(s) IV Push once  dextrose 50% Injectable 25 Gram(s) IV Push once  aspirin enteric coated 81 milliGRAM(s) Oral daily  tamsulosin 0.4 milliGRAM(s) Oral at bedtime  isosorbide   mononitrate ER Tablet (IMDUR) 30 milliGRAM(s) Oral daily  ranolazine 1000 milliGRAM(s) Oral two times a day  clopidogrel Tablet 75 milliGRAM(s) Oral daily  cefTRIAXone   IVPB 1 Gram(s) IV Intermittent every 24 hours  NIFEdipine XL 60 milliGRAM(s) Oral daily  buDESOnide  80 MICROgram(s)/formoterol 4.5 MICROgram(s) Inhaler 2 Puff(s) Inhalation two times a day  sevelamer hydrochloride 800 milliGRAM(s) Oral three times a day with meals  busPIRone 5 milliGRAM(s) Oral two times a day  cefTRIAXone   IVPB      acetylcysteine  Oral Solution 600 milliGRAM(s) Oral two times a day  atorvastatin 80 milliGRAM(s) Oral at bedtime  metoprolol succinate  milliGRAM(s) Oral daily    MEDICATIONS  (PRN):  dextrose Gel 1 Dose(s) Oral once PRN Blood Glucose LESS THAN 70 milliGRAM(s)/deciliter  glucagon  Injectable 1 milliGRAM(s) IntraMuscular once PRN Glucose LESS THAN 70 milligrams/deciliter  ALBUTerol    90 MICROgram(s) HFA Inhaler 2 Puff(s) Inhalation every 6 hours PRN Shortness of Breath  acetaminophen   Tablet. 650 milliGRAM(s) Oral every 6 hours PRN pain      T(F): 97.9 (09-15 @ 04:38), Max: 98.6 (09-14 @ 21:25)  HR: 85 (09-15 @ 04:38) (77 - 85)  BP: 133/75 (09-15 @ 04:38) (104/66 - 164/89)  RR: 18 (09-15 @ 04:38) (16 - 18)  SpO2: 97% (09-15 @ 04:38) (94% - 99%)    CAPILLARY BLOOD GLUCOSE  177 (15 Sep 2017 07:22)  274 (14 Sep 2017 22:44)  148 (14 Sep 2017 17:16)  168 (14 Sep 2017 11:05)  171 (14 Sep 2017 07:26)    I&O's Summary    14 Sep 2017 07:01  -  15 Sep 2017 07:00  --------------------------------------------------------  IN: 1760 mL / OUT: 2100 mL / NET: -340 mL    PHYSICAL EXAM:  GEN: Appears in no acute distress  HEENT: PERRLA, EOMI and accommodate, neck supple, no lymphadenopathy, no JVD  MOUTH: moist mucous membranes, no exudates or lesions   PULM: Clear to auscultation bilaterally, no wheezes, rales, rhonchi  CV: distant heart sounds, Regular rate and rhythm, S1S2, no murmurs, rubs or gallops  ABD: Soft, nontender to palpation, non-distended, normoactive bowel sounds  EXTREMITIES: No edema, + peripheral pulses  NEURO: AAOx3, moving all four extremities spontaneously  SKIN: No rashes, lesions, hematomas, ecchymosis  TUBES: L peripheral IV, james draining clear yellow urine low volume, shiley R femoral    LABS & IMAGING:  Labs personally reviewed [X]                        10.4   8.3   )-----------( 299      ( 15 Sep 2017 05:33 )             30.4     Hgb Trend: 10.4<--, 11.4<--, 11.3<--    09-15    133<L>  |  91<L>  |  83<H>  ----------------------------<  178<H>  5.0   |  20<L>  |  7.96<H>    Ca    9.0      15 Sep 2017 05:33    TPro  7.5  /  Alb  3.3  /  TBili  0.4  /  DBili  x   /  AST  36  /  ALT  28  /  AlkPhos  110  09-15    Creatinine Trend: 7.96<--, 8.84<--, 9.79<--    PT/INR - ( 14 Sep 2017 05:06 )   PT: 14.7 sec;   INR: 1.34 ratio         PTT - ( 14 Sep 2017 05:06 )  PTT:90.9 sec

## 2017-09-15 NOTE — PROGRESS NOTE ADULT - ASSESSMENT
Patient is a 73 yo male transferred from [facility] for NSTEMI. Patient now s/p 5 stent placement, with new onset CHF, LIN on CKD, pyelonephritis.

## 2017-09-15 NOTE — PROGRESS NOTE ADULT - SUBJECTIVE AND OBJECTIVE BOX
JIMI BUCHANAN 72y [id number]    Patient is a 72y old  Male who presents with a chief complaint of s/p cardiac cath (12 Sep 2017 19:27)      Follow Up/CC:  Pyelonephritis    Interval History/ROS:    Allergies    No Known Allergies    Intolerances        ANTIMICROBIALS:  cefTRIAXone   IVPB 1 every 24 hours  cefTRIAXone   IVPB        MEDICATIONS  (STANDING):  insulin lispro (HumaLOG) corrective regimen sliding scale   SubCutaneous three times a day before meals  insulin lispro (HumaLOG) corrective regimen sliding scale   SubCutaneous at bedtime  dextrose 5%. 1000 milliLiter(s) (50 mL/Hr) IV Continuous <Continuous>  dextrose 50% Injectable 12.5 Gram(s) IV Push once  dextrose 50% Injectable 25 Gram(s) IV Push once  dextrose 50% Injectable 25 Gram(s) IV Push once  aspirin enteric coated 81 milliGRAM(s) Oral daily  tamsulosin 0.4 milliGRAM(s) Oral at bedtime  isosorbide   mononitrate ER Tablet (IMDUR) 30 milliGRAM(s) Oral daily  ranolazine 1000 milliGRAM(s) Oral two times a day  atorvastatin 40 milliGRAM(s) Oral at bedtime  clopidogrel Tablet 75 milliGRAM(s) Oral daily  cefTRIAXone   IVPB 1 Gram(s) IV Intermittent every 24 hours  metoprolol 50 milliGRAM(s) Oral every 8 hours  NIFEdipine XL 60 milliGRAM(s) Oral daily  buDESOnide  80 MICROgram(s)/formoterol 4.5 MICROgram(s) Inhaler 2 Puff(s) Inhalation two times a day  sevelamer hydrochloride 800 milliGRAM(s) Oral three times a day with meals  busPIRone 5 milliGRAM(s) Oral two times a day  cefTRIAXone   IVPB      acetylcysteine  Oral Solution 600 milliGRAM(s) Oral two times a day    MEDICATIONS  (PRN):  dextrose Gel 1 Dose(s) Oral once PRN Blood Glucose LESS THAN 70 milliGRAM(s)/deciliter  glucagon  Injectable 1 milliGRAM(s) IntraMuscular once PRN Glucose LESS THAN 70 milligrams/deciliter  ALBUTerol    90 MICROgram(s) HFA Inhaler 2 Puff(s) Inhalation every 6 hours PRN Shortness of Breath  acetaminophen   Tablet. 650 milliGRAM(s) Oral every 6 hours PRN pain        Vital Signs Last 24 Hrs  T(C): 36.6 (15 Sep 2017 04:38), Max: 37 (14 Sep 2017 21:25)  T(F): 97.9 (15 Sep 2017 04:38), Max: 98.6 (14 Sep 2017 21:25)  HR: 85 (15 Sep 2017 04:38) (77 - 85)  BP: 133/75 (15 Sep 2017 04:38) (104/66 - 164/89)  BP(mean): --  RR: 18 (15 Sep 2017 04:38) (16 - 18)  SpO2: 97% (15 Sep 2017 04:38) (94% - 99%)    CBC Full  -  ( 15 Sep 2017 05:33 )  WBC Count : 8.3 K/uL  Hemoglobin : 10.4 g/dL  Hematocrit : 30.4 %  Platelet Count - Automated : 299 K/uL  Mean Cell Volume : 85.0 fl  Mean Cell Hemoglobin : 29.0 pg  Mean Cell Hemoglobin Concentration : 34.2 gm/dL  Auto Neutrophil # : x  Auto Lymphocyte # : x  Auto Monocyte # : x  Auto Eosinophil # : x  Auto Basophil # : x  Auto Neutrophil % : x  Auto Lymphocyte % : x  Auto Monocyte % : x  Auto Eosinophil % : x  Auto Basophil % : x    09-15    133<L>  |  91<L>  |  83<H>  ----------------------------<  178<H>  5.0   |  20<L>  |  7.96<H>    Ca    9.0      15 Sep 2017 05:33  Phos  8.4     09-15  Mg     2.5     09-15    TPro  7.5  /  Alb  3.3  /  TBili  0.4  /  DBili  x   /  AST  36  /  ALT  28  /  AlkPhos  110  09-15    LIVER FUNCTIONS - ( 15 Sep 2017 05:33 )  Alb: 3.3 g/dL / Pro: 7.5 g/dL / ALK PHOS: 110 U/L / ALT: 28 U/L RC / AST: 36 U/L / GGT: x               MICROBIOLOGY:          v            RADIOLOGY    US Renal (09.14.17 @ 10:20) >  Mild to moderate right-sided hydronephrosis and proximal right   hydroureter. The mid and distal right ureter not visualized. No previous   abdominal imaging for comparison.    Bilateral nonobstructing renal calculi.    Bilateral tiny/small renal cysts. JIMI BUCHANAN 72y [id number]    Patient is a 72y old  Male who presents with a chief complaint of s/p cardiac cath (12 Sep 2017 19:27)      Follow Up/CC:  Pyelonephritis    Interval History/ROS: no acute issues    Allergies    No Known Allergies    Intolerances        ANTIMICROBIALS:  cefTRIAXone   IVPB 1 every 24 hours  cefTRIAXone   IVPB        MEDICATIONS  (STANDING):  insulin lispro (HumaLOG) corrective regimen sliding scale   SubCutaneous three times a day before meals  insulin lispro (HumaLOG) corrective regimen sliding scale   SubCutaneous at bedtime  dextrose 5%. 1000 milliLiter(s) (50 mL/Hr) IV Continuous <Continuous>  dextrose 50% Injectable 12.5 Gram(s) IV Push once  dextrose 50% Injectable 25 Gram(s) IV Push once  dextrose 50% Injectable 25 Gram(s) IV Push once  aspirin enteric coated 81 milliGRAM(s) Oral daily  tamsulosin 0.4 milliGRAM(s) Oral at bedtime  isosorbide   mononitrate ER Tablet (IMDUR) 30 milliGRAM(s) Oral daily  ranolazine 1000 milliGRAM(s) Oral two times a day  atorvastatin 40 milliGRAM(s) Oral at bedtime  clopidogrel Tablet 75 milliGRAM(s) Oral daily  cefTRIAXone   IVPB 1 Gram(s) IV Intermittent every 24 hours  metoprolol 50 milliGRAM(s) Oral every 8 hours  NIFEdipine XL 60 milliGRAM(s) Oral daily  buDESOnide  80 MICROgram(s)/formoterol 4.5 MICROgram(s) Inhaler 2 Puff(s) Inhalation two times a day  sevelamer hydrochloride 800 milliGRAM(s) Oral three times a day with meals  busPIRone 5 milliGRAM(s) Oral two times a day  cefTRIAXone   IVPB      acetylcysteine  Oral Solution 600 milliGRAM(s) Oral two times a day    MEDICATIONS  (PRN):  dextrose Gel 1 Dose(s) Oral once PRN Blood Glucose LESS THAN 70 milliGRAM(s)/deciliter  glucagon  Injectable 1 milliGRAM(s) IntraMuscular once PRN Glucose LESS THAN 70 milligrams/deciliter  ALBUTerol    90 MICROgram(s) HFA Inhaler 2 Puff(s) Inhalation every 6 hours PRN Shortness of Breath  acetaminophen   Tablet. 650 milliGRAM(s) Oral every 6 hours PRN pain        Vital Signs Last 24 Hrs  T(C): 36.6 (15 Sep 2017 04:38), Max: 37 (14 Sep 2017 21:25)  T(F): 97.9 (15 Sep 2017 04:38), Max: 98.6 (14 Sep 2017 21:25)  HR: 85 (15 Sep 2017 04:38) (77 - 85)  BP: 133/75 (15 Sep 2017 04:38) (104/66 - 164/89)  BP(mean): --  RR: 18 (15 Sep 2017 04:38) (16 - 18)  SpO2: 97% (15 Sep 2017 04:38) (94% - 99%)    CBC Full  -  ( 15 Sep 2017 05:33 )  WBC Count : 8.3 K/uL  Hemoglobin : 10.4 g/dL  Hematocrit : 30.4 %  Platelet Count - Automated : 299 K/uL  Mean Cell Volume : 85.0 fl  Mean Cell Hemoglobin : 29.0 pg  Mean Cell Hemoglobin Concentration : 34.2 gm/dL  Auto Neutrophil # : x  Auto Lymphocyte # : x  Auto Monocyte # : x  Auto Eosinophil # : x  Auto Basophil # : x  Auto Neutrophil % : x  Auto Lymphocyte % : x  Auto Monocyte % : x  Auto Eosinophil % : x  Auto Basophil % : x    09-15    133<L>  |  91<L>  |  83<H>  ----------------------------<  178<H>  5.0   |  20<L>  |  7.96<H>    Ca    9.0      15 Sep 2017 05:33  Phos  8.4     09-15  Mg     2.5     09-15    TPro  7.5  /  Alb  3.3  /  TBili  0.4  /  DBili  x   /  AST  36  /  ALT  28  /  AlkPhos  110  09-15    LIVER FUNCTIONS - ( 15 Sep 2017 05:33 )  Alb: 3.3 g/dL / Pro: 7.5 g/dL / ALK PHOS: 110 U/L / ALT: 28 U/L RC / AST: 36 U/L / GGT: x               MICROBIOLOGY:          v            RADIOLOGY    US Renal (09.14.17 @ 10:20) >  Mild to moderate right-sided hydronephrosis and proximal right   hydroureter. The mid and distal right ureter not visualized. No previous   abdominal imaging for comparison.    Bilateral nonobstructing renal calculi.    Bilateral tiny/small renal cysts.

## 2017-09-15 NOTE — PROGRESS NOTE ADULT - SUBJECTIVE AND OBJECTIVE BOX
24H hour events: Hematoma was compressed overnight by [facility] NPs. Patient found sitting up this am at bedside w/ HD catheter in RFV. I alerted NP/RN staff and asked patient to remain laying supine until catheter relocated.    MEDICATIONS:  aspirin enteric coated 81 milliGRAM(s) Oral daily  tamsulosin 0.4 milliGRAM(s) Oral at bedtime  isosorbide   mononitrate ER Tablet (IMDUR) 30 milliGRAM(s) Oral daily  ranolazine 1000 milliGRAM(s) Oral two times a day  clopidogrel Tablet 75 milliGRAM(s) Oral daily  metoprolol 50 milliGRAM(s) Oral every 8 hours  NIFEdipine XL 60 milliGRAM(s) Oral daily    cefTRIAXone   IVPB 1 Gram(s) IV Intermittent every 24 hours  cefTRIAXone   IVPB        buDESOnide  80 MICROgram(s)/formoterol 4.5 MICROgram(s) Inhaler 2 Puff(s) Inhalation two times a day  ALBUTerol    90 MICROgram(s) HFA Inhaler 2 Puff(s) Inhalation every 6 hours PRN    busPIRone 5 milliGRAM(s) Oral two times a day  acetaminophen   Tablet. 650 milliGRAM(s) Oral every 6 hours PRN      insulin lispro (HumaLOG) corrective regimen sliding scale   SubCutaneous three times a day before meals  insulin lispro (HumaLOG) corrective regimen sliding scale   SubCutaneous at bedtime  dextrose Gel 1 Dose(s) Oral once PRN  dextrose 50% Injectable 12.5 Gram(s) IV Push once  dextrose 50% Injectable 25 Gram(s) IV Push once  dextrose 50% Injectable 25 Gram(s) IV Push once  glucagon  Injectable 1 milliGRAM(s) IntraMuscular once PRN  atorvastatin 40 milliGRAM(s) Oral at bedtime    dextrose 5%. 1000 milliLiter(s) IV Continuous <Continuous>      REVIEW OF SYSTEMS:  Ongoing right flank pain; denies CP, SOB, orthopnea, pnd, palpitations    PHYSICAL EXAM:  T(C): 36.6 (09-15-17 @ 04:38), Max: 37 (09-14-17 @ 21:25)  HR: 85 (09-15-17 @ 04:38) (77 - 85)  BP: 133/75 (09-15-17 @ 04:38) (104/66 - 164/89)  RR: 18 (09-15-17 @ 04:38) (16 - 18)  SpO2: 97% (09-15-17 @ 04:38) (94% - 99%)  Wt(kg): --  I&O's Summary    14 Sep 2017 07:01  -  15 Sep 2017 07:00  --------------------------------------------------------  IN: 1760 mL / OUT: 2100 mL / NET: -340 mL    15 Sep 2017 07:01  -  15 Sep 2017 10:17  --------------------------------------------------------  IN: 360 mL / OUT: 0 mL / NET: 360 mL      Appearance: Normal, appears stated age  HEENT:   Normal oral mucosa, PERRL, EOMI	  Lymphatic: No lymphadenopathy  Cardiovascular: Normal S1 S2, No JVD, No murmurs, No edema  Respiratory: Lungs clear to auscultation	  Psychiatry: A & O x 3, Mood & affect appropriate  Gastrointestinal:  Soft, Non-tender, + BS	  Skin: No rashes, No ecchymoses, No cyanosis	  Neurologic: Non-focal  Extremities: Normal range of motion, No clubbing, cyanosis or edema  Vascular: Peripheral pulses palpable 2+ bilaterally      LABS:	 	    CBC Full  -  ( 15 Sep 2017 05:33 )  WBC Count : 8.3 K/uL  Hemoglobin : 10.4 g/dL  Hematocrit : 30.4 %  Platelet Count - Automated : 299 K/uL  Mean Cell Volume : 85.0 fl  Mean Cell Hemoglobin : 29.0 pg  Mean Cell Hemoglobin Concentration : 34.2 gm/dL  Auto Neutrophil # : x  Auto Lymphocyte # : x  Auto Monocyte # : x  Auto Eosinophil # : x  Auto Basophil # : x  Auto Neutrophil % : x  Auto Lymphocyte % : x  Auto Monocyte % : x  Auto Eosinophil % : x  Auto Basophil % : x    09-15    133<L>  |  91<L>  |  83<H>  ----------------------------<  178<H>  5.0   |  20<L>  |  7.96<H>  09-14    137  |  92<L>  |  95<H>  ----------------------------<  137<H>  5.3   |  17<L>  |  8.84<H>    Ca    9.0      15 Sep 2017 05:33  Ca    9.5      14 Sep 2017 05:06  Phos  8.4     09-15  Mg     2.5     09-15    TPro  7.5  /  Alb  3.3  /  TBili  0.4  /  DBili  x   /  AST  36  /  ALT  28  /  AlkPhos  110  09-15      proBNP:   Lipid Profile:   HgA1c:   TSH:       CARDIAC MARKERS:            TELEMETRY: 	    ECG:  	  RADIOLOGY:  OTHER: 	    PREVIOUS DIAGNOSTIC TESTING:    [ ] Echocardiogram:  [ ]  Catheterization:  [ ] Stress Test:  	  	  ASSESSMENT/PLAN: 	    73 yo man, Colombian speaking man ( #[id number])    -CAD s/p 12-14  stents placed [id number],   -T2 DM. 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,   -radiculopathy with herniated disc,   -COPD,   -CKD creatine 2 months ago 1.2 ( nephrologist is Dr berg [phone]) , chronic kidney stones  -bipolar  ( no meds followed by psychiatry) ,     presented to [facility] hospital with 2 days intractable right flank pain radiating  to the groin with  with chillls, found pyelonephritis started on ceftriaxone; found LIN on CKD started on HD via RFV temp catheter, c/b NSTEMI s/p BABAR x5 9/14/17    daughter Anisa Lara  [phone]  renal is dr charles mosquera [phone]  cardiology dr lord    nstemi  -cont 24H hour events: Hematoma was compressed overnight by [facility] NPs. Patient found sitting up this am at bedside w/ HD catheter in RFV. I alerted NP/RN staff and asked patient to remain laying supine until catheter relocated.    MEDICATIONS:  aspirin enteric coated 81 milliGRAM(s) Oral daily  tamsulosin 0.4 milliGRAM(s) Oral at bedtime  isosorbide   mononitrate ER Tablet (IMDUR) 30 milliGRAM(s) Oral daily  ranolazine 1000 milliGRAM(s) Oral two times a day  clopidogrel Tablet 75 milliGRAM(s) Oral daily  metoprolol 50 milliGRAM(s) Oral every 8 hours  NIFEdipine XL 60 milliGRAM(s) Oral daily    cefTRIAXone   IVPB 1 Gram(s) IV Intermittent every 24 hours  cefTRIAXone   IVPB        buDESOnide  80 MICROgram(s)/formoterol 4.5 MICROgram(s) Inhaler 2 Puff(s) Inhalation two times a day  ALBUTerol    90 MICROgram(s) HFA Inhaler 2 Puff(s) Inhalation every 6 hours PRN    busPIRone 5 milliGRAM(s) Oral two times a day  acetaminophen   Tablet. 650 milliGRAM(s) Oral every 6 hours PRN      insulin lispro (HumaLOG) corrective regimen sliding scale   SubCutaneous three times a day before meals  insulin lispro (HumaLOG) corrective regimen sliding scale   SubCutaneous at bedtime  dextrose Gel 1 Dose(s) Oral once PRN  dextrose 50% Injectable 12.5 Gram(s) IV Push once  dextrose 50% Injectable 25 Gram(s) IV Push once  dextrose 50% Injectable 25 Gram(s) IV Push once  glucagon  Injectable 1 milliGRAM(s) IntraMuscular once PRN  atorvastatin 40 milliGRAM(s) Oral at bedtime    dextrose 5%. 1000 milliLiter(s) IV Continuous <Continuous>      REVIEW OF SYSTEMS:  Ongoing right flank pain; denies CP, SOB, orthopnea, pnd, palpitations    PHYSICAL EXAM:  T(C): 36.6 (09-15-17 @ 04:38), Max: 37 (09-14-17 @ 21:25)  HR: 85 (09-15-17 @ 04:38) (77 - 85)  BP: 133/75 (09-15-17 @ 04:38) (104/66 - 164/89)  RR: 18 (09-15-17 @ 04:38) (16 - 18)  SpO2: 97% (09-15-17 @ 04:38) (94% - 99%)  Wt(kg): --  I&O's Summary    14 Sep 2017 07:01  -  15 Sep 2017 07:00  --------------------------------------------------------  IN: 1760 mL / OUT: 2100 mL / NET: -340 mL    15 Sep 2017 07:01  -  15 Sep 2017 10:17  --------------------------------------------------------  IN: 360 mL / OUT: 0 mL / NET: 360 mL      Appearance: Normal, appears stated age  HEENT:   Normal oral mucosa, PERRL, EOMI	  Lymphatic: No lymphadenopathy  Cardiovascular: Normal S1 S2, No JVD, No murmurs, No edema  right radial pulse intact, hand strength/sensation equal b/l  left fem access large echymotic area, tender to palp, distal pulse doplerable  right fem vein HD catheter distal pulse doplerable, site nontender  Respiratory: Lungs clear to auscultation	  Psychiatry: A & O x 3, Mood & affect appropriate  Gastrointestinal:  Soft, Non-tender, + BS; right flank tender to deep palp  Skin: No rashes, No ecchymoses, No cyanosis	  Neurologic: Non-focal  Extremities: Normal range of motion, No clubbing, cyanosis or edema  Vascular: Peripheral pulses palpable 2+ bilaterally      LABS:	 	    CBC Full  -  ( 15 Sep 2017 05:33 )  WBC Count : 8.3 K/uL  Hemoglobin : 10.4 g/dL  Hematocrit : 30.4 %  Platelet Count - Automated : 299 K/uL  Mean Cell Volume : 85.0 fl  Mean Cell Hemoglobin : 29.0 pg  Mean Cell Hemoglobin Concentration : 34.2 gm/dL  Auto Neutrophil # : x  Auto Lymphocyte # : x  Auto Monocyte # : x  Auto Eosinophil # : x  Auto Basophil # : x  Auto Neutrophil % : x  Auto Lymphocyte % : x  Auto Monocyte % : x  Auto Eosinophil % : x  Auto Basophil % : x    09-15    133<L>  |  91<L>  |  83<H>  ----------------------------<  178<H>  5.0   |  20<L>  |  7.96<H>  09-14    137  |  92<L>  |  95<H>  ----------------------------<  137<H>  5.3   |  17<L>  |  8.84<H>    Ca    9.0      15 Sep 2017 05:33  Ca    9.5      14 Sep 2017 05:06  Phos  8.4     09-15  Mg     2.5     09-15    TPro  7.5  /  Alb  3.3  /  TBili  0.4  /  DBili  x   /  AST  36  /  ALT  28  /  AlkPhos  110  09-15      TELEMETRY: sinus 70-80	      < from: TTE with Doppler (w/Cont) (09.13.17 @ 22:23) >  Dimensions:    Normal Values:  LA:     4.4    2.0 - 4.0 cm  Ao:            2.0 - 3.8 cm  SEPTUM: 1.2    0.6 - 1.2 cm  PWT:    1.0    0.6 - 1.1 cm  LVIDd:  5.2    3.0 - 5.6 cm  LVIDs:  4.2    1.8 - 4.0 cm  Derived variables:  LVMI: 113 g/m2  RWT: 0.38  Fractional short: 19 %  EF (Visual Estimate): 35 %  Doppler Peak Velocity (m/sec): AoV=1.9    < end of copied text >  < from: TTE with Doppler (w/Cont) (09.13.17 @ 22:23) >  -----------------------------------------------------------------------  Conclusions:  1. Mild mitral annular calcification. Thickened and  tethered mitral valve leaflets with normal opening.  Mild-moderate mitral regurgitation.  2. Aortic valve not well visualized; appears calcified.  Peak transaortic valve gradient equals 14 mm Hg, mean  transaortic valve gradient equals 7 mm Hg, estimated aortic  valve area equals 1.4 sqcm (by continuity equation), aortic  valve velocity time integral equals 41 cm, consistent with  moderate aortic stenosis. No aortic valve regurgitation  seen.  3. Mild left ventricular enlargement. Eccentric left  ventricular hypertrophy (dilated left ventricle with normal  relative wall thickness).  4. Endocardial visualization enhanced with intravenous  injection of echo contrast (Definity). Moderate-severe  segmental left ventricular systolic dysfunction. The mid to  distal septum, mid to distal inferior, and the apical cap  are akinetic.  No left ventricular thrombus.  5. Normal right ventricular size and systolic function.  6. Thickened pericardium inferior to the right heart. Trace  pericardial effusion adjacent to the right heart. No  evidence of hemodynamic compromise.  *** No previous Echo exam.  ------------------------------------------------------------------------    < end of copied text >	    ASSESSMENT/PLAN: 	    73 yo man, Moldovan speaking man ( #[id number])    -CAD s/p 12-14  stents placed [id number],   -T2 DM. 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,   -radiculopathy with herniated disc,   -COPD,   -CKD creatine 2 months ago 1.2 ( nephrologist is Dr berg [phone]) , chronic kidney stones  -bipolar  ( no meds followed by psychiatry) ,     presented to [facility] hospital with 2 days intractable right flank pain radiating  to the groin with  with chillls, found pyelonephritis started on ceftriaxone; found LIN on CKD started on HD via RFV temp catheter, c/b NSTEMI s/p BABAR x5 (p/m LAD and p/m/d RCA) 9/14/17 impella assisted    daughter Anisa Lara  [phone]  renal is dr charles mosquera [phone]  cardiology dr lord    nstemi  -cont dapt 12mo  -inc statin to atorvastatin 80mg  -vascular checks q4h  **please recheck CT abd/pelv eval impela access site and right flank pain    HFrEF  -ACE/ARB when LIN resolves or if committed to HD  -convert metoprolol to succinate 150mg daily; titrate as BP tolerates/HR tolerates  -maintain negative balance - via HD?    LIN on CKD  ****please move catheter site as patient may try to sit up; explained to him importance of remaining supine and laying flat****    [id number] 24H hour events: Hematoma was compressed overnight by [facility] NPs. Patient found sitting up this am at bedside w/ HD catheter in RFV. I alerted NP/RN staff and asked patient to remain laying supine until catheter relocated.    MEDICATIONS:  aspirin enteric coated 81 milliGRAM(s) Oral daily  tamsulosin 0.4 milliGRAM(s) Oral at bedtime  isosorbide   mononitrate ER Tablet (IMDUR) 30 milliGRAM(s) Oral daily  ranolazine 1000 milliGRAM(s) Oral two times a day  clopidogrel Tablet 75 milliGRAM(s) Oral daily  metoprolol 50 milliGRAM(s) Oral every 8 hours  NIFEdipine XL 60 milliGRAM(s) Oral daily    cefTRIAXone   IVPB 1 Gram(s) IV Intermittent every 24 hours  cefTRIAXone   IVPB        buDESOnide  80 MICROgram(s)/formoterol 4.5 MICROgram(s) Inhaler 2 Puff(s) Inhalation two times a day  ALBUTerol    90 MICROgram(s) HFA Inhaler 2 Puff(s) Inhalation every 6 hours PRN    busPIRone 5 milliGRAM(s) Oral two times a day  acetaminophen   Tablet. 650 milliGRAM(s) Oral every 6 hours PRN      insulin lispro (HumaLOG) corrective regimen sliding scale   SubCutaneous three times a day before meals  insulin lispro (HumaLOG) corrective regimen sliding scale   SubCutaneous at bedtime  dextrose Gel 1 Dose(s) Oral once PRN  dextrose 50% Injectable 12.5 Gram(s) IV Push once  dextrose 50% Injectable 25 Gram(s) IV Push once  dextrose 50% Injectable 25 Gram(s) IV Push once  glucagon  Injectable 1 milliGRAM(s) IntraMuscular once PRN  atorvastatin 40 milliGRAM(s) Oral at bedtime    dextrose 5%. 1000 milliLiter(s) IV Continuous <Continuous>      REVIEW OF SYSTEMS:  Ongoing right flank pain; denies CP, SOB, orthopnea, pnd, palpitations    PHYSICAL EXAM:  T(C): 36.6 (09-15-17 @ 04:38), Max: 37 (09-14-17 @ 21:25)  HR: 85 (09-15-17 @ 04:38) (77 - 85)  BP: 133/75 (09-15-17 @ 04:38) (104/66 - 164/89)  RR: 18 (09-15-17 @ 04:38) (16 - 18)  SpO2: 97% (09-15-17 @ 04:38) (94% - 99%)  Wt(kg): --  I&O's Summary    14 Sep 2017 07:01  -  15 Sep 2017 07:00  --------------------------------------------------------  IN: 1760 mL / OUT: 2100 mL / NET: -340 mL    15 Sep 2017 07:01  -  15 Sep 2017 10:17  --------------------------------------------------------  IN: 360 mL / OUT: 0 mL / NET: 360 mL      Appearance: Normal, appears stated age  HEENT:   Normal oral mucosa, PERRL, EOMI	  Lymphatic: No lymphadenopathy  Cardiovascular: Normal S1 S2, No JVD, No murmurs, No edema  right radial pulse intact, hand strength/sensation equal b/l  left fem access large echymotic area, tender to palp, distal pulse doplerable  right fem vein HD catheter distal pulse doplerable, site nontender  Respiratory: Lungs clear to auscultation	  Psychiatry: A & O x 3, Mood & affect appropriate  Gastrointestinal:  Soft, Non-tender, + BS; right flank tender to deep palp  Skin: No rashes, No ecchymoses, No cyanosis	  Neurologic: Non-focal  Extremities: Normal range of motion, No clubbing, cyanosis or edema  Vascular: Peripheral pulses palpable 2+ bilaterally      LABS:	 	    CBC Full  -  ( 15 Sep 2017 05:33 )  WBC Count : 8.3 K/uL  Hemoglobin : 10.4 g/dL  Hematocrit : 30.4 %  Platelet Count - Automated : 299 K/uL  Mean Cell Volume : 85.0 fl  Mean Cell Hemoglobin : 29.0 pg  Mean Cell Hemoglobin Concentration : 34.2 gm/dL  Auto Neutrophil # : x  Auto Lymphocyte # : x  Auto Monocyte # : x  Auto Eosinophil # : x  Auto Basophil # : x  Auto Neutrophil % : x  Auto Lymphocyte % : x  Auto Monocyte % : x  Auto Eosinophil % : x  Auto Basophil % : x    09-15    133<L>  |  91<L>  |  83<H>  ----------------------------<  178<H>  5.0   |  20<L>  |  7.96<H>  09-14    137  |  92<L>  |  95<H>  ----------------------------<  137<H>  5.3   |  17<L>  |  8.84<H>    Ca    9.0      15 Sep 2017 05:33  Ca    9.5      14 Sep 2017 05:06  Phos  8.4     09-15  Mg     2.5     09-15    TPro  7.5  /  Alb  3.3  /  TBili  0.4  /  DBili  x   /  AST  36  /  ALT  28  /  AlkPhos  110  09-15      TELEMETRY: sinus 70-80	      < from: TTE with Doppler (w/Cont) (09.13.17 @ 22:23) >  Dimensions:    Normal Values:  LA:     4.4    2.0 - 4.0 cm  Ao:            2.0 - 3.8 cm  SEPTUM: 1.2    0.6 - 1.2 cm  PWT:    1.0    0.6 - 1.1 cm  LVIDd:  5.2    3.0 - 5.6 cm  LVIDs:  4.2    1.8 - 4.0 cm  Derived variables:  LVMI: 113 g/m2  RWT: 0.38  Fractional short: 19 %  EF (Visual Estimate): 35 %  Doppler Peak Velocity (m/sec): AoV=1.9    < end of copied text >  < from: TTE with Doppler (w/Cont) (09.13.17 @ 22:23) >  -----------------------------------------------------------------------  Conclusions:  1. Mild mitral annular calcification. Thickened and  tethered mitral valve leaflets with normal opening.  Mild-moderate mitral regurgitation.  2. Aortic valve not well visualized; appears calcified.  Peak transaortic valve gradient equals 14 mm Hg, mean  transaortic valve gradient equals 7 mm Hg, estimated aortic  valve area equals 1.4 sqcm (by continuity equation), aortic  valve velocity time integral equals 41 cm, consistent with  moderate aortic stenosis. No aortic valve regurgitation  seen.  3. Mild left ventricular enlargement. Eccentric left  ventricular hypertrophy (dilated left ventricle with normal  relative wall thickness).  4. Endocardial visualization enhanced with intravenous  injection of echo contrast (Definity). Moderate-severe  segmental left ventricular systolic dysfunction. The mid to  distal septum, mid to distal inferior, and the apical cap  are akinetic.  No left ventricular thrombus.  5. Normal right ventricular size and systolic function.  6. Thickened pericardium inferior to the right heart. Trace  pericardial effusion adjacent to the right heart. No  evidence of hemodynamic compromise.  *** No previous Echo exam.  ------------------------------------------------------------------------    < end of copied text >	    ASSESSMENT/PLAN: 	    71 yo man, Congolese speaking man ( #[id number])    -CAD s/p 12-14  stents placed [id number],   -T2 DM. 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,   -radiculopathy with herniated disc,   -COPD,   -CKD creatine 2 months ago 1.2 ( nephrologist is Dr berg [phone]) , chronic kidney stones  -bipolar  ( no meds followed by psychiatry) ,     presented to [facility] hospital with 2 days intractable right flank pain radiating  to the groin with  with chillls, found pyelonephritis started on ceftriaxone; found LIN on CKD started on HD via RFV temp catheter, c/b NSTEMI s/p BABAR x5 (p/m LAD and p/m/d RCA) 9/14/17 impella assisted    daughter Anisa Lara  [phone]  renal is dr charles mosquera [phone]  cardiology dr lord    nstemi  -cont dapt 12mo  -inc statin to atorvastatin 80mg  -vascular checks q4h  **please recheck CT abd/pelv eval impela access site and right flank pain  **daily EKG    HFrEF  -ACE/ARB when LIN resolves or if committed to HD  -convert metoprolol to succinate 150mg daily; titrate as BP tolerates/HR tolerates  -maintain negative balance - via HD?    LIN on CKD  ****please move catheter site as patient may try to sit up; explained to him importance of remaining supine and laying flat****    [id number] 24H hour events: Hematoma was compressed overnight by [facility] NPs. Patient found sitting up this am at bedside w/ HD catheter in RFV. I alerted NP/RN staff and asked patient to remain laying supine until catheter relocated.    MEDICATIONS:  aspirin enteric coated 81 milliGRAM(s) Oral daily  tamsulosin 0.4 milliGRAM(s) Oral at bedtime  isosorbide   mononitrate ER Tablet (IMDUR) 30 milliGRAM(s) Oral daily  ranolazine 1000 milliGRAM(s) Oral two times a day  clopidogrel Tablet 75 milliGRAM(s) Oral daily  metoprolol 50 milliGRAM(s) Oral every 8 hours  NIFEdipine XL 60 milliGRAM(s) Oral daily  cefTRIAXone   IVPB 1 Gram(s) IV Intermittent every 24 hours  cefTRIAXone   IVPB      buDESOnide  80 MICROgram(s)/formoterol 4.5 MICROgram(s) Inhaler 2 Puff(s) Inhalation two times a day  ALBUTerol    90 MICROgram(s) HFA Inhaler 2 Puff(s) Inhalation every 6 hours PRN  busPIRone 5 milliGRAM(s) Oral two times a day  acetaminophen   Tablet. 650 milliGRAM(s) Oral every 6 hours PRN  insulin lispro (HumaLOG) corrective regimen sliding scale   SubCutaneous three times a day before meals  insulin lispro (HumaLOG) corrective regimen sliding scale   SubCutaneous at bedtime  dextrose Gel 1 Dose(s) Oral once PRN  dextrose 50% Injectable 12.5 Gram(s) IV Push once  dextrose 50% Injectable 25 Gram(s) IV Push once  dextrose 50% Injectable 25 Gram(s) IV Push once  glucagon  Injectable 1 milliGRAM(s) IntraMuscular once PRN  atorvastatin 40 milliGRAM(s) Oral at bedtime  dextrose 5%. 1000 milliLiter(s) IV Continuous <Continuous>    REVIEW OF SYSTEMS:  Ongoing right flank pain; denies CP, SOB, orthopnea, pnd, palpitations    PHYSICAL EXAM:  T(C): 36.6 (09-15-17 @ 04:38), Max: 37 (09-14-17 @ 21:25)  HR: 85 (09-15-17 @ 04:38) (77 - 85)  BP: 133/75 (09-15-17 @ 04:38) (104/66 - 164/89)  RR: 18 (09-15-17 @ 04:38) (16 - 18)  SpO2: 97% (09-15-17 @ 04:38) (94% - 99%)    Appearance: Normal, appears stated age  HEENT:   Normal oral mucosa, PERRL, EOMI	  Lymphatic: No lymphadenopathy  Cardiovascular: Normal S1 S2, No JVD, No murmurs, No edema  right radial pulse intact, hand strength/sensation equal b/l  left fem access large echymotic area, tender to palp, distal pulse Doppler-able  right fem vein HD catheter distal pulse Doppler-able, site nontender  Respiratory: Lungs clear to auscultation	  Psychiatry: A & O x 3, Mood & affect appropriate  Gastrointestinal:  Soft, Non-tender, + BS; right flank tender to deep palp  Skin: No rashes, No ecchymoses, No cyanosis	  Neurologic: Non-focal  Extremities: Normal range of motion, No clubbing, cyanosis or edema    Tele:  OSIEL     I&O's Summary    14 Sep 2017 07:01  -  15 Sep 2017 07:00  --------------------------------------------------------  IN: 1760 mL / OUT: 2100 mL / NET: -340 mL    15 Sep 2017 07:01  -  15 Sep 2017 10:17  --------------------------------------------------------  IN: 360 mL / OUT: 0 mL / NET: 360 mL    LABS:	 	    CBC Full  -  ( 15 Sep 2017 05:33 )  WBC Count : 8.3 K/uL  Hemoglobin : 10.4 g/dL  Hematocrit : 30.4 %  Platelet Count - Automated : 299 K/uL    09-15  133<L>  |  91<L>  |  83<H>  ----------------------------<  178<H>  5.0   |  20<L>  |  7.96<H>    09-14  137  |  92<L>  |  95<H>  ----------------------------<  137<H>  5.3   |  17<L>  |  8.84<H>    Ca    9.0      15 Sep 2017 05:33  Ca    9.5      14 Sep 2017 05:06    Phos  8.4     09-15  Mg     2.5     09-15    TPro  7.5  /  Alb  3.3  /  TBili  0.4  /  DBili  x   /  AST  36  /  ALT  28  /  AlkPhos  110  09-15    TTE with Doppler (w/Cont) (09.13.17 @ 22:23) >  Dimensions:    Normal Values:  LA:     4.4    2.0 - 4.0 cm  Ao:            2.0 - 3.8 cm  SEPTUM: 1.2    0.6 - 1.2 cm  PWT:    1.0    0.6 - 1.1 cm  LVIDd:  5.2    3.0 - 5.6 cm  LVIDs:  4.2    1.8 - 4.0 cm  Derived variables:  LVMI: 113 g/m2  RWT: 0.38  Fractional short: 19 %  EF (Visual Estimate): 35 %  Doppler Peak Velocity (m/sec): AoV=1.9  ----------------------------------------------------------------------  Conclusions:  1. Mild mitral annular calcification. Thickened and tethered mitral valve leaflets with normal opening. Mild-moderate mitral regurgitation.  2. Aortic valve not well visualized; appears calcified. Peak transaortic valve gradient equals 14 mm Hg, mean transaortic valve gradient equals 7 mm Hg, estimated aortic valve area equals 1.4 sqcm (by continuity equation), aortic valve velocity time integral equals 41 cm, consistent with moderate aortic stenosis. No aortic valve regurgitation seen.  3. Mild left ventricular enlargement. Eccentric left ventricular hypertrophy (dilated left ventricle with normal relative wall thickness).  4. Endocardial visualization enhanced with intravenous injection of echo contrast (Definity). Moderate-severe segmental left ventricular systolic dysfunction. The mid to distal septum, mid to distal inferior, and the apical cap are akinetic.  No left ventricular thrombus.  5. Normal right ventricular size and systolic function.   6. Thickened pericardium inferior to the right heart. Trace pericardial effusion adjacent to the right heart. No evidence of hemodynamic compromise.  *** No previous Echo exam.	      ASSESSMENT/PLAN: 	  73 yo man, [place] speaking man ( #[id number])    -CAD s/p 12-14  stents placed [id number],   -T2 DM. 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,   -radiculopathy with herniated disc,   -COPD,   -CKD creatine 2 months ago 1.2 ( nephrologist is Dr berg [phone]) , chronic kidney stones  -bipolar  ( no meds followed by psychiatry) ,     presented to [facility] with 2 days intractable right flank pain radiating  to the groin with  with chillls, found pyelonephritis started on ceftriaxone; found LIN on CKD started on HD via RFV temp catheter, c/b NSTEMI s/p BABAR x5 (p/m LAD and p/m/d RCA) 9/14/17 impella assisted    daughter Anisa Lara  [phone]  renal is Dr. Betty Foss [phone]  cardiology Dr. Hinojosa    nstemi  -cont dapt 12mo  -inc statin to atorvastatin 80mg  -vascular checks q4h  **please recheck CT abd/pelv eval impella access site and right flank pain  **daily EKG    HFrEF  -ACE/ARB when LIN resolves or if committed to HD  -convert metoprolol to succinate 150mg daily; titrate as BP tolerates/HR tolerates  -maintain negative balance - via HD?    LIN on CKD  ****please move catheter site as patient may try to sit up; explained to him importance of remaining supine and laying flat****    DESHAWN Mark M.D.  [id number]  Patient seen and examined; discussed with cardiology fellow. Hx., exam and labs as above.  I agree with the assessment and recommendations.  JAMES Roy M.D.  Cardiology Consult Service  [phone] or [phone]

## 2017-09-15 NOTE — PROGRESS NOTE ADULT - SUBJECTIVE AND OBJECTIVE BOX
JIMI BUCHANAN  72y  Male    Patient is a 72y old  Male who presents with a chief complaint of s/p cardiac cath (12 Sep 2017 19:27)    family at bed side. complains of right flank pain. Denies any chest pain or shortness of breath.  had cardiac cath and had BABAR in LAD and RCA. now on plavix and asa.  Renal sonogram shows right hydronephrosis.  Has a james catheter urine out put is minimal          PAST MEDICAL & SURGICAL HISTORY:  CVA (cerebral vascular accident)  COPD (chronic obstructive pulmonary disease)  BPH (benign prostatic hyperplasia)  Bipolar affective disorder  CKD (chronic kidney disease)  Pancreatitis  Hypertension  Dyslipidemia  Diabetes mellitus  Coronary artery disease  History of cardiac catheterization          PHYSICAL EXAM:    T(C): 36.7 (09-15-17 @ 11:44), Max: 37 (09-14-17 @ 21:25)  HR: 79 (09-15-17 @ 11:44) (77 - 85)  BP: 136/80 (09-15-17 @ 11:44) (104/66 - 164/89)  RR: 18 (09-15-17 @ 11:44) (16 - 18)  SpO2: 98% (09-15-17 @ 11:44) (94% - 99%)  Wt(kg): --    I&O's Detail    14 Sep 2017 07:01  -  15 Sep 2017 07:00  --------------------------------------------------------  IN:    Oral Fluid: 960 mL    Other: 800 mL  Total IN: 1760 mL    OUT:    Indwelling Catheter - Urethral: 300 mL    Other: 1800 mL  Total OUT: 2100 mL    Total NET: -340 mL      15 Sep 2017 07:01  -  15 Sep 2017 12:35  --------------------------------------------------------  IN:    Oral Fluid: 360 mL  Total IN: 360 mL    OUT:  Total OUT: 0 mL    Total NET: 360 mL          Respiratory: clear anteriorly, decreased BS at bases  Cardiovascular: S1 S2  Gastrointestinal: soft NT ND +BS  Extremities: edema   Neuro: Awake and alert    MEDICATIONS  (STANDING):  insulin lispro (HumaLOG) corrective regimen sliding scale   SubCutaneous three times a day before meals  insulin lispro (HumaLOG) corrective regimen sliding scale   SubCutaneous at bedtime  dextrose 5%. 1000 milliLiter(s) (50 mL/Hr) IV Continuous <Continuous>  dextrose 50% Injectable 12.5 Gram(s) IV Push once  dextrose 50% Injectable 25 Gram(s) IV Push once  dextrose 50% Injectable 25 Gram(s) IV Push once  aspirin enteric coated 81 milliGRAM(s) Oral daily  tamsulosin 0.4 milliGRAM(s) Oral at bedtime  isosorbide   mononitrate ER Tablet (IMDUR) 30 milliGRAM(s) Oral daily  ranolazine 1000 milliGRAM(s) Oral two times a day  clopidogrel Tablet 75 milliGRAM(s) Oral daily  cefTRIAXone   IVPB 1 Gram(s) IV Intermittent every 24 hours  NIFEdipine XL 60 milliGRAM(s) Oral daily  buDESOnide  80 MICROgram(s)/formoterol 4.5 MICROgram(s) Inhaler 2 Puff(s) Inhalation two times a day  sevelamer hydrochloride 800 milliGRAM(s) Oral three times a day with meals  busPIRone 5 milliGRAM(s) Oral two times a day  cefTRIAXone   IVPB      acetylcysteine  Oral Solution 600 milliGRAM(s) Oral two times a day  atorvastatin 80 milliGRAM(s) Oral at bedtime  metoprolol succinate  milliGRAM(s) Oral daily    MEDICATIONS  (PRN):  dextrose Gel 1 Dose(s) Oral once PRN Blood Glucose LESS THAN 70 milliGRAM(s)/deciliter  glucagon  Injectable 1 milliGRAM(s) IntraMuscular once PRN Glucose LESS THAN 70 milligrams/deciliter  ALBUTerol    90 MICROgram(s) HFA Inhaler 2 Puff(s) Inhalation every 6 hours PRN Shortness of Breath  acetaminophen   Tablet. 650 milliGRAM(s) Oral every 6 hours PRN pain                            10.4   8.3   )-----------( 299      ( 15 Sep 2017 05:33 )             30.4       09-15    133<L>  |  91<L>  |  83<H>  ----------------------------<  178<H>  5.0   |  20<L>  |  7.96<H>    Ca    9.0      15 Sep 2017 05:33  Phos  8.4     09-15  Mg     2.5     09-15    TPro  7.5  /  Alb  3.3  /  TBili  0.4  /  DBili  x   /  AST  36  /  ALT  28  /  AlkPhos  110  09-15

## 2017-09-16 LAB
ALBUMIN SERPL ELPH-MCNC: 3.1 G/DL — LOW (ref 3.3–5)
ALP SERPL-CCNC: 99 U/L — SIGNIFICANT CHANGE UP (ref 40–120)
ALT FLD-CCNC: 21 U/L — SIGNIFICANT CHANGE UP (ref 10–45)
ANION GAP SERPL CALC-SCNC: 27 MMOL/L — HIGH (ref 5–17)
APTT BLD: 35.7 SEC — SIGNIFICANT CHANGE UP (ref 27.5–37.4)
AST SERPL-CCNC: 31 U/L — SIGNIFICANT CHANGE UP (ref 10–40)
BILIRUB SERPL-MCNC: 0.5 MG/DL — SIGNIFICANT CHANGE UP (ref 0.2–1.2)
BLD GP AB SCN SERPL QL: NEGATIVE — SIGNIFICANT CHANGE UP
BUN SERPL-MCNC: 103 MG/DL — HIGH (ref 7–23)
CALCIUM SERPL-MCNC: 9.7 MG/DL — SIGNIFICANT CHANGE UP (ref 8.4–10.5)
CHLORIDE SERPL-SCNC: 87 MMOL/L — LOW (ref 96–108)
CK MB CFR SERPL CALC: 5.3 NG/ML — SIGNIFICANT CHANGE UP (ref 0–6.7)
CK MB CFR SERPL CALC: 6 NG/ML — SIGNIFICANT CHANGE UP (ref 0–6.7)
CK SERPL-CCNC: 85 U/L — SIGNIFICANT CHANGE UP (ref 30–200)
CK SERPL-CCNC: 91 U/L — SIGNIFICANT CHANGE UP (ref 30–200)
CO2 SERPL-SCNC: 15 MMOL/L — LOW (ref 22–31)
CREAT SERPL-MCNC: 9.55 MG/DL — HIGH (ref 0.5–1.3)
GLUCOSE SERPL-MCNC: 148 MG/DL — HIGH (ref 70–99)
HCT VFR BLD CALC: 28 % — LOW (ref 39–50)
HGB BLD-MCNC: 9.4 G/DL — LOW (ref 13–17)
INR BLD: 1.37 RATIO — HIGH (ref 0.88–1.16)
MAGNESIUM SERPL-MCNC: 2.6 MG/DL — SIGNIFICANT CHANGE UP (ref 1.6–2.6)
MCHC RBC-ENTMCNC: 26.9 PG — LOW (ref 27–34)
MCHC RBC-ENTMCNC: 33.6 GM/DL — SIGNIFICANT CHANGE UP (ref 32–36)
MCV RBC AUTO: 80 FL — SIGNIFICANT CHANGE UP (ref 80–100)
PHOSPHATE SERPL-MCNC: 10.7 MG/DL — HIGH (ref 2.5–4.5)
PLATELET # BLD AUTO: 294 K/UL — SIGNIFICANT CHANGE UP (ref 150–400)
POTASSIUM SERPL-MCNC: 6.2 MMOL/L — CRITICAL HIGH (ref 3.5–5.3)
POTASSIUM SERPL-SCNC: 6.2 MMOL/L — CRITICAL HIGH (ref 3.5–5.3)
PROT SERPL-MCNC: 7.3 G/DL — SIGNIFICANT CHANGE UP (ref 6–8.3)
PROTHROM AB SERPL-ACNC: 14.9 SEC — HIGH (ref 9.8–12.7)
RBC # BLD: 3.5 M/UL — LOW (ref 4.2–5.8)
RBC # FLD: 13.4 % — SIGNIFICANT CHANGE UP (ref 10.3–14.5)
RH IG SCN BLD-IMP: NEGATIVE — SIGNIFICANT CHANGE UP
SODIUM SERPL-SCNC: 129 MMOL/L — LOW (ref 135–145)
TROPONIN T SERPL-MCNC: 2.52 NG/ML — HIGH (ref 0–0.06)
TROPONIN T SERPL-MCNC: 2.85 NG/ML — HIGH (ref 0–0.06)
WBC # BLD: 11.99 K/UL — HIGH (ref 3.8–10.5)
WBC # FLD AUTO: 11.99 K/UL — HIGH (ref 3.8–10.5)

## 2017-09-16 PROCEDURE — 93010 ELECTROCARDIOGRAM REPORT: CPT

## 2017-09-16 PROCEDURE — 99233 SBSQ HOSP IP/OBS HIGH 50: CPT | Mod: GC

## 2017-09-16 PROCEDURE — 99233 SBSQ HOSP IP/OBS HIGH 50: CPT

## 2017-09-16 PROCEDURE — 71010: CPT | Mod: 26

## 2017-09-16 RX ORDER — MORPHINE SULFATE 50 MG/1
2 CAPSULE, EXTENDED RELEASE ORAL ONCE
Qty: 0 | Refills: 0 | Status: DISCONTINUED | OUTPATIENT
Start: 2017-09-16 | End: 2017-09-16

## 2017-09-16 RX ADMIN — Medication 81 MILLIGRAM(S): at 11:10

## 2017-09-16 RX ADMIN — CLOPIDOGREL BISULFATE 75 MILLIGRAM(S): 75 TABLET, FILM COATED ORAL at 11:10

## 2017-09-16 RX ADMIN — CEFTRIAXONE 100 GRAM(S): 500 INJECTION, POWDER, FOR SOLUTION INTRAMUSCULAR; INTRAVENOUS at 21:54

## 2017-09-16 RX ADMIN — ISOSORBIDE MONONITRATE 30 MILLIGRAM(S): 60 TABLET, EXTENDED RELEASE ORAL at 18:04

## 2017-09-16 RX ADMIN — BUDESONIDE AND FORMOTEROL FUMARATE DIHYDRATE 2 PUFF(S): 160; 4.5 AEROSOL RESPIRATORY (INHALATION) at 06:02

## 2017-09-16 RX ADMIN — RANOLAZINE 1000 MILLIGRAM(S): 500 TABLET, FILM COATED, EXTENDED RELEASE ORAL at 18:05

## 2017-09-16 RX ADMIN — HEPARIN SODIUM 5000 UNIT(S): 5000 INJECTION INTRAVENOUS; SUBCUTANEOUS at 21:51

## 2017-09-16 RX ADMIN — BUDESONIDE AND FORMOTEROL FUMARATE DIHYDRATE 2 PUFF(S): 160; 4.5 AEROSOL RESPIRATORY (INHALATION) at 18:04

## 2017-09-16 RX ADMIN — SEVELAMER CARBONATE 800 MILLIGRAM(S): 2400 POWDER, FOR SUSPENSION ORAL at 08:29

## 2017-09-16 RX ADMIN — Medication 600 MILLIGRAM(S): at 07:00

## 2017-09-16 RX ADMIN — Medication 1: at 18:07

## 2017-09-16 RX ADMIN — Medication 1: at 08:28

## 2017-09-16 RX ADMIN — TAMSULOSIN HYDROCHLORIDE 0.4 MILLIGRAM(S): 0.4 CAPSULE ORAL at 21:51

## 2017-09-16 RX ADMIN — RANOLAZINE 1000 MILLIGRAM(S): 500 TABLET, FILM COATED, EXTENDED RELEASE ORAL at 06:03

## 2017-09-16 RX ADMIN — Medication 5 MILLIGRAM(S): at 06:03

## 2017-09-16 RX ADMIN — Medication 150 MILLIGRAM(S): at 06:06

## 2017-09-16 RX ADMIN — Medication 60 MILLIGRAM(S): at 06:02

## 2017-09-16 RX ADMIN — ATORVASTATIN CALCIUM 80 MILLIGRAM(S): 80 TABLET, FILM COATED ORAL at 21:51

## 2017-09-16 RX ADMIN — Medication 5 MILLIGRAM(S): at 18:05

## 2017-09-16 RX ADMIN — SEVELAMER CARBONATE 800 MILLIGRAM(S): 2400 POWDER, FOR SUSPENSION ORAL at 18:04

## 2017-09-16 NOTE — PROGRESS NOTE ADULT - PROBLEM SELECTOR PLAN 5
Patient started on ceftriaxone ~9/6 for pyelonephritis. Day 10/14 today.  - c/w ceftriaxone  - ID following Patient started on ceftriaxone ~9/6 for pyelonephritis. Day 11/14 today.  - c/w ceftriaxone  - ID following

## 2017-09-16 NOTE — PROGRESS NOTE ADULT - SUBJECTIVE AND OBJECTIVE BOX
Subjective: Seen on HD. Acutely SOB earlier today. BW noted including K of 6.2      MEDICATIONS  (STANDING):  insulin lispro (HumaLOG) corrective regimen sliding scale   SubCutaneous three times a day before meals  insulin lispro (HumaLOG) corrective regimen sliding scale   SubCutaneous at bedtime  dextrose 5%. 1000 milliLiter(s) (50 mL/Hr) IV Continuous <Continuous>  dextrose 50% Injectable 12.5 Gram(s) IV Push once  dextrose 50% Injectable 25 Gram(s) IV Push once  dextrose 50% Injectable 25 Gram(s) IV Push once  aspirin enteric coated 81 milliGRAM(s) Oral daily  tamsulosin 0.4 milliGRAM(s) Oral at bedtime  isosorbide   mononitrate ER Tablet (IMDUR) 30 milliGRAM(s) Oral daily  ranolazine 1000 milliGRAM(s) Oral two times a day  clopidogrel Tablet 75 milliGRAM(s) Oral daily  cefTRIAXone   IVPB 1 Gram(s) IV Intermittent every 24 hours  NIFEdipine XL 60 milliGRAM(s) Oral daily  buDESOnide  80 MICROgram(s)/formoterol 4.5 MICROgram(s) Inhaler 2 Puff(s) Inhalation two times a day  sevelamer hydrochloride 800 milliGRAM(s) Oral three times a day with meals  busPIRone 5 milliGRAM(s) Oral two times a day  cefTRIAXone   IVPB      atorvastatin 80 milliGRAM(s) Oral at bedtime  metoprolol succinate  milliGRAM(s) Oral daily  heparin  Injectable 5000 Unit(s) SubCutaneous every 8 hours    MEDICATIONS  (PRN):  dextrose Gel 1 Dose(s) Oral once PRN Blood Glucose LESS THAN 70 milliGRAM(s)/deciliter  glucagon  Injectable 1 milliGRAM(s) IntraMuscular once PRN Glucose LESS THAN 70 milligrams/deciliter  ALBUTerol    90 MICROgram(s) HFA Inhaler 2 Puff(s) Inhalation every 6 hours PRN Shortness of Breath  acetaminophen   Tablet. 650 milliGRAM(s) Oral every 6 hours PRN pain  oxyCODONE    IR 5 milliGRAM(s) Oral every 4 hours PRN Severe Pain (7 - 10)          T(C): 36.4 (09-16-17 @ 11:08), Max: 36.7 (09-15-17 @ 20:01)  HR: 75 (09-16-17 @ 11:08) (71 - 79)  BP: 108/74 (09-16-17 @ 11:08) (108/74 - 148/83)  RR: 18 (09-16-17 @ 11:08) (18 - 19)  SpO2: 92% (09-16-17 @ 11:08) (92% - 100%)  Wt(kg): --        I&O's Detail    15 Sep 2017 07:01  -  16 Sep 2017 07:00  --------------------------------------------------------  IN:    Oral Fluid: 720 mL  Total IN: 720 mL    OUT:    Indwelling Catheter - Urethral: 100 mL  Total OUT: 100 mL    Total NET: 620 mL      16 Sep 2017 07:01  -  16 Sep 2017 13:02  --------------------------------------------------------  IN:    Oral Fluid: 340 mL  Total IN: 340 mL    OUT:    Indwelling Catheter - Urethral: 400 mL  Total OUT: 400 mL    Total NET: -60 mL               PHYSICAL EXAM:    GENERAL: dyspneic  EYES: EOMI, PERRLA, conjunctiva and sclera clear  NECK: Supple, no inc in JVP  CHEST/LUNG: crackles  HEART: S1S2  ABDOMEN: Soft, Nontender, Nondistended; Bowel sounds present  EXTREMITIES: trace edema   ACCESS: R fem QC      LABS:  CBC Full  -  ( 16 Sep 2017 08:13 )  WBC Count : 11.99 K/uL  Hemoglobin : 9.4 g/dL  Hematocrit : 28.0 %  Platelet Count - Automated : 294 K/uL  Mean Cell Volume : 80.0 fl  Mean Cell Hemoglobin : 26.9 pg  Mean Cell Hemoglobin Concentration : 33.6 gm/dL  Auto Neutrophil # : x  Auto Lymphocyte # : x  Auto Monocyte # : x  Auto Eosinophil # : x  Auto Basophil # : x  Auto Neutrophil % : x  Auto Lymphocyte % : x  Auto Monocyte % : x  Auto Eosinophil % : x  Auto Basophil % : x    09-16    129<L>  |  87<L>  |  103<H>  ----------------------------<  148<H>  6.2<HH>   |  15<L>  |  9.55<H>    Ca    9.7      16 Sep 2017 08:13  Phos  10.7     09-16  Mg     2.6     09-16    TPro  7.3  /  Alb  3.1<L>  /  TBili  0.5  /  DBili  x   /  AST  31  /  ALT  21  /  AlkPhos  99  09-16    PT/INR - ( 16 Sep 2017 07:20 )   PT: 14.9 sec;   INR: 1.37 ratio         PTT - ( 16 Sep 2017 07:20 )  PTT:35.7 sec        ASSESSMENT and PLAN:    * LIN due to an obstructed solitary functioning R kidney. Awaiting Urological decompression  * Remains HD dependent. Dialysis #3 today. UF goal 2.5-3kg as BP permits to tx pulm edema. K free for last 30 min for moderated hyperK  * Fem HD catheter over 1 week old. Will maintain for now. However, if pt becomes febrile, dc temp access and give a dose of Vanco

## 2017-09-16 NOTE — PROGRESS NOTE ADULT - SUBJECTIVE AND OBJECTIVE BOX
[facility] Progress Note  Consult Spectra [id number]    Interval Events:  Pt overnight with some chest pain resolving with NRB. Not noted to be hypoxic at that time. EKGS reviewed - no significant change in ST segments. Troponin noted to be elevated post-PCI without elevation in CK or CK-MB.     The chest pressure/squeezing has been present for several days now. worse with lying down. Accompanied by orthopnea.    Review of Systems:  Constitutional: [- ] Fever [- ] Chills [ ] Fatigue [ ] Weight change   HEENT: [ ] Blurred vision [ ] Eye Pain [ -] Headache [ ] Runny nose [ ] Sore Throat   Respiratory: [ ] Cough [ ] Wheezing [x ] Shortness of breath  Cardiovascular: [x ] Chest Pain [- ] Palpitations [ ] CHAMPION [ ] PND [x ] Orthopnea  Gastrointestinal: [- ] Abdominal Pain [ ] Diarrhea [ ] Constipation [ ] Hemorrhoids [- ] Nausea [- ] Vomiting  Genitourinary: [ ] Nocturia [- ] Dysuria [ ] Incontinence  Extremities: [- ] Swelling [- ] Joint Pain  Neurologic: [ ] Focal deficit [ ] Paresthesias [- ] Syncope  Skin: [- ] Rash [ ] Ecchymoses [ ] Wounds [ ] Lesions  Psychiatry: [- ] Depression [ ] Suicidal/Homicidal Ideation [ ] Anxiety [ ] Sleep Disturbances  [ x] 10 point review of systems is otherwise negative except as mentioned above            [ ]Unable to obtain    MEDICATIONS:  aspirin enteric coated 81 milliGRAM(s) Oral daily  tamsulosin 0.4 milliGRAM(s) Oral at bedtime  isosorbide   mononitrate ER Tablet (IMDUR) 30 milliGRAM(s) Oral daily  ranolazine 1000 milliGRAM(s) Oral two times a day  clopidogrel Tablet 75 milliGRAM(s) Oral daily  NIFEdipine XL 60 milliGRAM(s) Oral daily  metoprolol succinate  milliGRAM(s) Oral daily  heparin  Injectable 5000 Unit(s) SubCutaneous every 8 hours    cefTRIAXone   IVPB 1 Gram(s) IV Intermittent every 24 hours  cefTRIAXone   IVPB        buDESOnide  80 MICROgram(s)/formoterol 4.5 MICROgram(s) Inhaler 2 Puff(s) Inhalation two times a day  ALBUTerol    90 MICROgram(s) HFA Inhaler 2 Puff(s) Inhalation every 6 hours PRN    busPIRone 5 milliGRAM(s) Oral two times a day  acetaminophen   Tablet. 650 milliGRAM(s) Oral every 6 hours PRN  oxyCODONE    IR 5 milliGRAM(s) Oral every 4 hours PRN  morphine  - Injectable 2 milliGRAM(s) IV Push once  insulin lispro (HumaLOG) corrective regimen sliding scale   SubCutaneous three times a day before meals  insulin lispro (HumaLOG) corrective regimen sliding scale   SubCutaneous at bedtime    dextrose 5%. 1000 milliLiter(s) IV Continuous <Continuous>    PHYSICAL EXAM:  T(C): 36.4 (09-16-17 @ 08:03), Max: 36.7 (09-15-17 @ 11:44)  HR: 71 (09-16-17 @ 08:23) (71 - 79)  BP: 129/82 (09-16-17 @ 08:03) (125/74 - 148/83)  RR: 19 (09-16-17 @ 08:23) (18 - 19)  SpO2: 96% (09-16-17 @ 08:23) (94% - 100%)    15 Sep 2017 07:01  -  16 Sep 2017 07:00  --------------------------------------------------------  IN: 720 mL / OUT: 100 mL / NET: 620 mL    16 Sep 2017 07:01  -  16 Sep 2017 10:51  --------------------------------------------------------  IN: 340 mL / OUT: 400 mL / NET: -60 mL    Daily     Daily     Appearance: No acute distress  HEENT:   mmm	  Cardiovascular: Normal S1 S2, JVP moderately elevated with +HJR  Respiratory: bibasilar crackles with mildly reduced air movement	  Psychiatry: mood & affect appropriate  Gastrointestinal:  soft nt nd  Skin: No rashes, No cyanosis	  Neurologic: reports some short term memory issues  Extremities: no LE edema    LABS:	 	  CBC Full  -  ( 16 Sep 2017 08:13 )  WBC Count : 11.99 K/uL  Hemoglobin : 9.4 g/dL  Hematocrit : 28.0 %  Platelet Count - Automated : 294 K/uL    09-15    133<L>  |  91<L>  |  83<H>  ----------------------------<  178<H>  5.0   |  20<L>  |  7.96<H>    Ca    9.0      15 Sep 2017 05:33  Phos  8.4     09-15  Mg     2.5     09-15    TPro  7.5  /  Alb  3.3  /  TBili  0.4  /  DBili  x   /  AST  36  /  ALT  28  /  AlkPhos  110  09-15  HgA1c: Hemoglobin A1C, Whole Blood: 6.5 % (09-13 @ 10:32)    TSH:     CARDIAC MARKERS:  Troponin T, Serum: 2.85 ng/mL (09-16 @ 04:29)  Troponin T, Serum: 2.85 ng/mL (09-15 @ 23:11)    TELEMETRY: 	SR 1AVB    ECG:  SR, ant q waves with nonspecific twave changes [facility] Progress Note  Consult Spectra [id number]    Pt overnight with some chest pain resolving with NRB. Not noted to be hypoxic at that time. EKGS reviewed - no significant change in ST segments. Troponin noted to be elevated post-PCI without elevation in CK or CK-MB.     The chest pressure/squeezing has been present for several days now. worse with lying down. Accompanied by orthopnea.    Review of Systems:  Constitutional: [- ] Fever [- ] Chills [ ] Fatigue [ ] Weight change   HEENT: [ ] Blurred vision [ ] Eye Pain [ -] Headache [ ] Runny nose [ ] Sore Throat   Respiratory: [ ] Cough [ ] Wheezing [x ] Shortness of breath  Cardiovascular: [x ] Chest Pain [- ] Palpitations [ ] CHAMPION [ ] PND [x ] Orthopnea  Gastrointestinal: [- ] Abdominal Pain [ ] Diarrhea [ ] Constipation [ ] Hemorrhoids [- ] Nausea [- ] Vomiting  Genitourinary: [ ] Nocturia [- ] Dysuria [ ] Incontinence  Extremities: [- ] Swelling [- ] Joint Pain  Neurologic: [ ] Focal deficit [ ] Paresthesias [- ] Syncope  Skin: [- ] Rash [ ] Ecchymoses [ ] Wounds [ ] Lesions  Psychiatry: [- ] Depression [ ] Suicidal/Homicidal Ideation [ ] Anxiety [ ] Sleep Disturbances  [ x] 10 point review of systems is otherwise negative except as mentioned above            [ ]Unable to obtain    MEDICATIONS:  aspirin enteric coated 81 milliGRAM(s) Oral daily  tamsulosin 0.4 milliGRAM(s) Oral at bedtime  isosorbide   mononitrate ER Tablet (IMDUR) 30 milliGRAM(s) Oral daily  ranolazine 1000 milliGRAM(s) Oral two times a day  clopidogrel Tablet 75 milliGRAM(s) Oral daily  NIFEdipine XL 60 milliGRAM(s) Oral daily  metoprolol succinate  milliGRAM(s) Oral daily  heparin  Injectable 5000 Unit(s) SubCutaneous every 8 hours  cefTRIAXone   IVPB 1 Gram(s) IV Intermittent every 24 hours  cefTRIAXone   IVPB      buDESOnide  80 MICROgram(s)/formoterol 4.5 MICROgram(s) Inhaler 2 Puff(s) Inhalation two times a day  ALBUTerol    90 MICROgram(s) HFA Inhaler 2 Puff(s) Inhalation every 6 hours PRN  busPIRone 5 milliGRAM(s) Oral two times a day  acetaminophen   Tablet. 650 milliGRAM(s) Oral every 6 hours PRN  oxyCODONE    IR 5 milliGRAM(s) Oral every 4 hours PRN  morphine  - Injectable 2 milliGRAM(s) IV Push once  insulin lispro (HumaLOG) corrective regimen sliding scale   SubCutaneous three times a day before meals  insulin lispro (HumaLOG) corrective regimen sliding scale   SubCutaneous at bedtime  dextrose 5%. 1000 milliLiter(s) IV Continuous <Continuous>    PHYSICAL EXAM:  T(C): 36.4 (09-16-17 @ 08:03), Max: 36.7 (09-15-17 @ 11:44)  HR: 71 (09-16-17 @ 08:23) (71 - 79)  BP: 129/82 (09-16-17 @ 08:03) (125/74 - 148/83)  RR: 19 (09-16-17 @ 08:23) (18 - 19)  SpO2: 96% (09-16-17 @ 08:23) (94% - 100%)    Appearance: No acute distress  HEENT:   mmm	  Cardiovascular: Normal S1 S2, JVP moderately elevated with +HJR  Respiratory: bibasilar crackles with mildly reduced air movement	  Psychiatry: mood & affect appropriate  Gastrointestinal:  soft nt nd  Skin: No rashes, No cyanosis	  Neurologic: reports some short term memory issues  Extremities: no LE edema    TELEMETRY: 	SR 1AVB      ECG:  SR, ant q waves with nonspecific twave changes    15 Sep 2017 07:01  -  16 Sep 2017 07:00  --------------------------------------------------------  IN: 720 mL / OUT: 100 mL / NET: 620 mL    16 Sep 2017 07:01  -  16 Sep 2017 10:51  --------------------------------------------------------  IN: 340 mL / OUT: 400 mL / NET: -60 mL    LABS:	 	  CBC Full  -  ( 16 Sep 2017 08:13 )  WBC Count : 11.99 K/uL  Hemoglobin : 9.4 g/dL  Hematocrit : 28.0 %  Platelet Count - Automated : 294 K/uL    09-15    133<L>  |  91<L>  |  83<H>  ----------------------------<  178<H>  5.0   |  20<L>  |  7.96<H>    Ca    9.0      15 Sep 2017 05:33  Phos  8.4     09-15  Mg     2.5     09-15    TPro  7.5  /  Alb  3.3  /  TBili  0.4  /  DBili  x   /  AST  36  /  ALT  28  /  AlkPhos  110  09-15  HgA1c: Hemoglobin A1C, Whole Blood: 6.5 % (09-13 @ 10:32)    CARDIAC MARKERS:  Troponin T, Serum: 2.85 ng/mL (09-16 @ 04:29)  Troponin T, Serum: 2.85 ng/mL (09-15 @ 23:11)

## 2017-09-16 NOTE — PROGRESS NOTE ADULT - PROBLEM SELECTOR PLAN 1
PMH CAD w/ multiple stents. Diagnostic cath 9/12 diffuse 99% stenosis in mid LAD, 30% stenosis in mid PCX, 80% stenosis in mid RCA, 85% distal RCA. Therapeutic cath 9/14 - 2 stents in LAD, 3 in RCA.  - cards on board with new onset CP o/n  - continue to trend CE today  - c/w ASA, plavix  - c/w lipitor 80  - c/w ranexa, imdur  - c/w procardia xl  - followed by cards  - daily EKG PMH CAD w/ multiple stents. Diagnostic cath 9/12 diffuse 99% stenosis in mid LAD, 30% stenosis in mid PCX, 80% stenosis in mid RCA, 85% distal RCA. Therapeutic cath 9/14 - 2 stents in LAD, 3 in RCA.  - cards on board with new onset CP o/n  - likely 2/2 fluid overload - will get HD today  - continue to trend CE today - likely just from CKD + recent stent  - c/w ASA, plavix  - c/w lipitor 80  - c/w ranexa, imdur  - per cards, if BP still high after HD, will give another Imdur 30  - c/w procardia xl  - followed by cards  - daily EKG

## 2017-09-16 NOTE — PROGRESS NOTE ADULT - PROBLEM SELECTOR PLAN 7
Patient with h/o COPD  - Symbicort BID  - albuterol PRN  - nasal cannula O2 PRN Patient with h/o COPD  - Symbicort BID  - albuterol PRN  - nasal cannula O2 PRN  - if very dyspneic, will start Bipap

## 2017-09-16 NOTE — PROGRESS NOTE ADULT - PROBLEM SELECTOR PLAN 3
LIN on CKD in setting of L kidney atrophy (on US) and obstructed R kidney with hydro. Cr 2 mo ago 1.2. 9.8 on admission to [facility]. Patient began HD at [facility]. HD#4 9/14.   - IR will not put in new HD cath at this time as patient is bleeding risk  - also with high bleeding risk for percutaneous nephrostomy  - currently with indwelling james  - tylenol for pain PRN  - oxycodone IR 5 q4 for severe pain LIN on CKD in setting of L kidney atrophy (on US) and obstructed R kidney with hydro. Cr 2 mo ago 1.2. 9.8 on admission to [facility]. Patient began HD at [facility]. HD#4 9/14.   - HD next today via R femoral Shiley  - IR will not put in new HD cath at this time as patient is bleeding risk  - also with high bleeding risk for percutaneous nephrostomy  - currently with indwelling james draining blood in urine likely 2/2 ACs  - tylenol for pain PRN  - oxycodone IR 5 q4 for severe pain LIN on CKD in setting of L kidney atrophy (on US) and obstructed R kidney with hydro. Cr 2 mo ago 1.2. 9.8 on admission to [facility]. Patient began HD at [facility]. HD#4 9/14.   - patient with SOB & crackles today - likely fluid overload  - HD next today via R femoral Shiley - per nephro, will pull if patient febrile  - IR will not put in new HD cath at this time as patient is bleeding risk  - also with high bleeding risk for percutaneous nephrostomy  - currently with indwelling james draining blood in urine likely 2/2 ACs  - tylenol for pain PRN  - oxycodone IR 5 q4 for severe pain

## 2017-09-16 NOTE — PROGRESS NOTE ADULT - PROBLEM SELECTOR PLAN 2
Pt. w/o PMH CHF. Echo showing EF 35%. Mild-moderate MR, moderate AS, mild LV enlargement, eccentric LV hypertrophy (dilated LV w/ nl relative wall thickness). Mod-severe segmental LV sys dysfunction. The mid to distal septum, mid to distal inferior, apical cap akinetic. Nl RV size & sys fxn  - change metoprolol 40 q8 to metoprolol succ 150 - will titrate w/ BPs Pt. w/o PMH CHF. Echo showing EF 35%. Mild-moderate MR, moderate AS, mild LV enlargement, eccentric LV hypertrophy (dilated LV w/ nl relative wall thickness). Mod-severe segmental LV sys dysfunction. The mid to distal septum, mid to distal inferior, apical cap akinetic. Nl RV size & sys fxn  - metoprolol succ 150 - will titrate w/ BPs

## 2017-09-16 NOTE — PROGRESS NOTE ADULT - SUBJECTIVE AND OBJECTIVE BOX
Patient is a 72y old  Male who presents with a chief complaint of s/p cardiac cath (12 Sep 2017 19:27)      SUBJECTIVE / OVERNIGHT EVENTS: Overnight, night intern was called for patient having chest pain and tightness. On assessment, his chest pain was improved on NRB. Cardiology examined patient. EKG at that time showed some ST elevation in V1-V3 though it did not meet criteria for STEMI. Troponins 2.85, CKMD 4.7, CK 85. (last troponins from [facility] during NSTEMI 0.19 and 0.12) Repeat CE 6 hours later were unchanged. Cardiology discussed possible heparin gtt, however patient has bleeding into james and had an episode of blood tinged sputum. PT/PTT/INR ordered. Cardiology will see in morning and reassess.    This morning, patient said his chest pain is very minimal. His breathing is improved on NRB, and he was switched to NC. Will keep in close contact with cardiology about plan.     MEDICATIONS  (STANDING):  insulin lispro (HumaLOG) corrective regimen sliding scale   SubCutaneous three times a day before meals  insulin lispro (HumaLOG) corrective regimen sliding scale   SubCutaneous at bedtime  dextrose 5%. 1000 milliLiter(s) (50 mL/Hr) IV Continuous <Continuous>  dextrose 50% Injectable 12.5 Gram(s) IV Push once  dextrose 50% Injectable 25 Gram(s) IV Push once  dextrose 50% Injectable 25 Gram(s) IV Push once  aspirin enteric coated 81 milliGRAM(s) Oral daily  tamsulosin 0.4 milliGRAM(s) Oral at bedtime  isosorbide   mononitrate ER Tablet (IMDUR) 30 milliGRAM(s) Oral daily  ranolazine 1000 milliGRAM(s) Oral two times a day  clopidogrel Tablet 75 milliGRAM(s) Oral daily  cefTRIAXone   IVPB 1 Gram(s) IV Intermittent every 24 hours  NIFEdipine XL 60 milliGRAM(s) Oral daily  buDESOnide  80 MICROgram(s)/formoterol 4.5 MICROgram(s) Inhaler 2 Puff(s) Inhalation two times a day  sevelamer hydrochloride 800 milliGRAM(s) Oral three times a day with meals  busPIRone 5 milliGRAM(s) Oral two times a day  cefTRIAXone   IVPB      atorvastatin 80 milliGRAM(s) Oral at bedtime  metoprolol succinate  milliGRAM(s) Oral daily  heparin  Injectable 5000 Unit(s) SubCutaneous every 8 hours    MEDICATIONS  (PRN):  dextrose Gel 1 Dose(s) Oral once PRN Blood Glucose LESS THAN 70 milliGRAM(s)/deciliter  glucagon  Injectable 1 milliGRAM(s) IntraMuscular once PRN Glucose LESS THAN 70 milligrams/deciliter  ALBUTerol    90 MICROgram(s) HFA Inhaler 2 Puff(s) Inhalation every 6 hours PRN Shortness of Breath  acetaminophen   Tablet. 650 milliGRAM(s) Oral every 6 hours PRN pain  oxyCODONE    IR 5 milliGRAM(s) Oral every 4 hours PRN Severe Pain (7 - 10)      T(F): 97.5 (09-16 @ 08:03), Max: 98.1 (09-15 @ 11:44)  HR: 71 (09-16 @ 08:23) (71 - 79)  BP: 129/82 (09-16 @ 08:03) (125/74 - 148/83)  RR: 19 (09-16 @ 08:23) (18 - 19)  SpO2: 96% (09-16 @ 08:23) (94% - 100%)    CAPILLARY BLOOD GLUCOSE  151 (16 Sep 2017 07:33)  137 (15 Sep 2017 21:39)  205 (15 Sep 2017 16:13)  188 (15 Sep 2017 11:15)      I&O's Summary    15 Sep 2017 07:01  -  16 Sep 2017 07:00  --------------------------------------------------------  IN: 720 mL / OUT: 100 mL / NET: 620 mL      PHYSICAL EXAM:  GEN: Appears in no acute distress  HEENT: PERRLA, EOMI and accommodate, neck supple, no lymphadenopathy, no JVD  MOUTH: moist mucous membranes, no exudates or lesions   PULM: Clear to auscultation bilaterally, no wheezes, rales, rhonchi  CV: distant heart sounds, Regular rate and rhythm, S1S2, no murmurs, rubs or gallops  ABD: Soft, nontender to palpation, non-distended, normoactive bowel sounds  EXTREMITIES: No edema, + peripheral pulses  NEURO: AAOx3, moving all four extremities spontaneously  SKIN: No rashes, lesions, hematomas, ecchymosis  TUBES: L peripheral IV, james draining dark urine with blood in it low volume, shiley R femoral      LABS & IMAGING:  Labs personally reviewed [X]                        9.4    11.99 )-----------( 294      ( 16 Sep 2017 08:13 )             28.0     Hgb Trend: 9.4<--, 10.4<--, 11.4<--, 11.3<--    09-15    133<L>  |  91<L>  |  83<H>  ----------------------------<  178<H>  5.0   |  20<L>  |  7.96<H>    Ca    9.0      15 Sep 2017 05:33  Phos  8.4     09-15  Mg     2.5     09-15    TPro  7.5  /  Alb  3.3  /  TBili  0.4  /  DBili  x   /  AST  36  /  ALT  28  /  AlkPhos  110  09-15    Creatinine Trend: 7.96<--, 8.84<--, 9.79<--    PT/INR - ( 16 Sep 2017 07:20 )   PT: 14.9 sec;   INR: 1.37 ratio         PTT - ( 16 Sep 2017 07:20 )  PTT:35.7 sec      Imaging personally reviewed [ ] Patient is a 72y old  Male who presents with a chief complaint of s/p cardiac cath (12 Sep 2017 19:27)      SUBJECTIVE / OVERNIGHT EVENTS: Overnight, night intern was called for patient having chest pain and tightness. On assessment, his chest pain was improved on NRB. Cardiology examined patient. EKG at that time showed some ST elevation in V1-V3 though it did not meet criteria for STEMI. Troponins 2.85, CKMD 4.7, CK 85. (last troponins from [facility] during NSTEMI 0.19 and 0.12) Repeat CE 6 hours later were unchanged. Cardiology discussed possible heparin gtt, however patient has bleeding into james and had an episode of blood tinged sputum. PT/PTT/INR ordered. Cardiology will see in morning and reassess.    This morning, patient said his chest pain is very minimal. His breathing is improved on NRB, and he was switched to NC. Will keep in close contact with cardiology about plan.     Tele: NSR 1st degree 70s-90s    MEDICATIONS  (STANDING):  insulin lispro (HumaLOG) corrective regimen sliding scale   SubCutaneous three times a day before meals  insulin lispro (HumaLOG) corrective regimen sliding scale   SubCutaneous at bedtime  dextrose 5%. 1000 milliLiter(s) (50 mL/Hr) IV Continuous <Continuous>  dextrose 50% Injectable 12.5 Gram(s) IV Push once  dextrose 50% Injectable 25 Gram(s) IV Push once  dextrose 50% Injectable 25 Gram(s) IV Push once  aspirin enteric coated 81 milliGRAM(s) Oral daily  tamsulosin 0.4 milliGRAM(s) Oral at bedtime  isosorbide   mononitrate ER Tablet (IMDUR) 30 milliGRAM(s) Oral daily  ranolazine 1000 milliGRAM(s) Oral two times a day  clopidogrel Tablet 75 milliGRAM(s) Oral daily  cefTRIAXone   IVPB 1 Gram(s) IV Intermittent every 24 hours  NIFEdipine XL 60 milliGRAM(s) Oral daily  buDESOnide  80 MICROgram(s)/formoterol 4.5 MICROgram(s) Inhaler 2 Puff(s) Inhalation two times a day  sevelamer hydrochloride 800 milliGRAM(s) Oral three times a day with meals  busPIRone 5 milliGRAM(s) Oral two times a day  cefTRIAXone   IVPB      atorvastatin 80 milliGRAM(s) Oral at bedtime  metoprolol succinate  milliGRAM(s) Oral daily  heparin  Injectable 5000 Unit(s) SubCutaneous every 8 hours    MEDICATIONS  (PRN):  dextrose Gel 1 Dose(s) Oral once PRN Blood Glucose LESS THAN 70 milliGRAM(s)/deciliter  glucagon  Injectable 1 milliGRAM(s) IntraMuscular once PRN Glucose LESS THAN 70 milligrams/deciliter  ALBUTerol    90 MICROgram(s) HFA Inhaler 2 Puff(s) Inhalation every 6 hours PRN Shortness of Breath  acetaminophen   Tablet. 650 milliGRAM(s) Oral every 6 hours PRN pain  oxyCODONE    IR 5 milliGRAM(s) Oral every 4 hours PRN Severe Pain (7 - 10)      T(F): 97.5 (09-16 @ 08:03), Max: 98.1 (09-15 @ 11:44)  HR: 71 (09-16 @ 08:23) (71 - 79)  BP: 129/82 (09-16 @ 08:03) (125/74 - 148/83)  RR: 19 (09-16 @ 08:23) (18 - 19)  SpO2: 96% (09-16 @ 08:23) (94% - 100%)    CAPILLARY BLOOD GLUCOSE  151 (16 Sep 2017 07:33)  137 (15 Sep 2017 21:39)  205 (15 Sep 2017 16:13)  188 (15 Sep 2017 11:15)      I&O's Summary    15 Sep 2017 07:01  -  16 Sep 2017 07:00  --------------------------------------------------------  IN: 720 mL / OUT: 100 mL / NET: 620 mL      PHYSICAL EXAM:  GEN: Appears in no acute distress  HEENT: PERRLA, EOMI and accommodate, neck supple, no lymphadenopathy, no JVD  MOUTH: moist mucous membranes, no exudates or lesions   PULM: Clear to auscultation bilaterally, no wheezes, rales, rhonchi  CV: distant heart sounds, Regular rate and rhythm, S1S2, no murmurs, rubs or gallops  ABD: Soft, nontender to palpation, non-distended, normoactive bowel sounds  EXTREMITIES: No edema, + peripheral pulses  NEURO: AAOx3, moving all four extremities spontaneously  SKIN: No rashes, lesions, hematomas, ecchymosis  TUBES: L peripheral IV, james draining dark urine with blood in it low volume, shiley R femoral      LABS & IMAGING:  Labs personally reviewed [X]                        9.4    11.99 )-----------( 294      ( 16 Sep 2017 08:13 )             28.0     Hgb Trend: 9.4<--, 10.4<--, 11.4<--, 11.3<--      Creatinine Trend: 7.96<--, 8.84<--, 9.79<--    PT/INR - ( 16 Sep 2017 07:20 )   PT: 14.9 sec;   INR: 1.37 ratio         PTT - ( 16 Sep 2017 07:20 )  PTT:35.7 sec      Imaging personally reviewed [ ] Patient is a 72y old  Male who presents with a chief complaint of s/p cardiac cath (12 Sep 2017 19:27)      SUBJECTIVE / OVERNIGHT EVENTS: Overnight, night intern was called for patient having chest pain and tightness. On assessment, his chest pain was improved on NRB. Cardiology examined patient. EKG at that time showed some ST elevation in V1-V3 though it did not meet criteria for STEMI. Troponins 2.85, CKMD 4.7, CK 85. (last troponins from [facility] during NSTEMI 0.19 and 0.12) Repeat CE 6 hours later were unchanged. Cardiology discussed possible heparin gtt, however patient has bleeding into james and had an episode of blood tinged sputum. PT/PTT/INR ordered. Cardiology will see in morning and reassess.    This morning, patient said his chest pain is very minimal. His breathing is improved on NRB.. Will keep in close contact with cardiology about plan.     Tele: NSR 1st degree 70s-90s    MEDICATIONS  (STANDING):  insulin lispro (HumaLOG) corrective regimen sliding scale   SubCutaneous three times a day before meals  insulin lispro (HumaLOG) corrective regimen sliding scale   SubCutaneous at bedtime  dextrose 5%. 1000 milliLiter(s) (50 mL/Hr) IV Continuous <Continuous>  dextrose 50% Injectable 12.5 Gram(s) IV Push once  dextrose 50% Injectable 25 Gram(s) IV Push once  dextrose 50% Injectable 25 Gram(s) IV Push once  aspirin enteric coated 81 milliGRAM(s) Oral daily  tamsulosin 0.4 milliGRAM(s) Oral at bedtime  isosorbide   mononitrate ER Tablet (IMDUR) 30 milliGRAM(s) Oral daily  ranolazine 1000 milliGRAM(s) Oral two times a day  clopidogrel Tablet 75 milliGRAM(s) Oral daily  cefTRIAXone   IVPB 1 Gram(s) IV Intermittent every 24 hours  NIFEdipine XL 60 milliGRAM(s) Oral daily  buDESOnide  80 MICROgram(s)/formoterol 4.5 MICROgram(s) Inhaler 2 Puff(s) Inhalation two times a day  sevelamer hydrochloride 800 milliGRAM(s) Oral three times a day with meals  busPIRone 5 milliGRAM(s) Oral two times a day  cefTRIAXone   IVPB      atorvastatin 80 milliGRAM(s) Oral at bedtime  metoprolol succinate  milliGRAM(s) Oral daily  heparin  Injectable 5000 Unit(s) SubCutaneous every 8 hours    MEDICATIONS  (PRN):  dextrose Gel 1 Dose(s) Oral once PRN Blood Glucose LESS THAN 70 milliGRAM(s)/deciliter  glucagon  Injectable 1 milliGRAM(s) IntraMuscular once PRN Glucose LESS THAN 70 milligrams/deciliter  ALBUTerol    90 MICROgram(s) HFA Inhaler 2 Puff(s) Inhalation every 6 hours PRN Shortness of Breath  acetaminophen   Tablet. 650 milliGRAM(s) Oral every 6 hours PRN pain  oxyCODONE    IR 5 milliGRAM(s) Oral every 4 hours PRN Severe Pain (7 - 10)      T(F): 97.5 (09-16 @ 08:03), Max: 98.1 (09-15 @ 11:44)  HR: 71 (09-16 @ 08:23) (71 - 79)  BP: 129/82 (09-16 @ 08:03) (125/74 - 148/83)  RR: 19 (09-16 @ 08:23) (18 - 19)  SpO2: 96% (09-16 @ 08:23) (94% - 100%)    CAPILLARY BLOOD GLUCOSE  151 (16 Sep 2017 07:33)  137 (15 Sep 2017 21:39)  205 (15 Sep 2017 16:13)  188 (15 Sep 2017 11:15)      I&O's Summary    15 Sep 2017 07:01  -  16 Sep 2017 07:00  --------------------------------------------------------  IN: 720 mL / OUT: 100 mL / NET: 620 mL      PHYSICAL EXAM:  GEN: Appears in no acute distress  HEENT: PERRLA, EOMI and accommodate, neck supple, no lymphadenopathy, no JVD  MOUTH: moist mucous membranes, no exudates or lesions   PULM: bilateral crackles. Pt on NRB satting 94% at time of exam  CV: distant heart sounds, Regular rate and rhythm, S1S2, no murmurs, rubs or gallops  ABD: Soft, nontender to palpation, non-distended, normoactive bowel sounds  EXTREMITIES: No edema, + peripheral pulses  NEURO: AAOx3, moving all four extremities spontaneously  SKIN: No rashes, lesions, hematomas, ecchymosis  TUBES: L peripheral IV, james draining dark urine (casts? blood?) in it low volume, sammi KRAUES femoral      LABS & IMAGING:  Labs personally reviewed [X]                        9.4    11.99 )-----------( 294      ( 16 Sep 2017 08:13 )             28.0     Hgb Trend: 9.4<--, 10.4<--, 11.4<--, 11.3<--      Creatinine Trend: 7.96<--, 8.84<--, 9.79<--    PT/INR - ( 16 Sep 2017 07:20 )   PT: 14.9 sec;   INR: 1.37 ratio         PTT - ( 16 Sep 2017 07:20 )  PTT:35.7 sec      Imaging personally reviewed [ ] Patient is a 72y old  Male who presents with a chief complaint of s/p cardiac cath (12 Sep 2017 19:27)      SUBJECTIVE / OVERNIGHT EVENTS: Overnight, night intern was called for patient having chest pain and tightness. On assessment, his chest pain was improved on NRB. Cardiology examined patient. EKG at that time showed some ST elevation in V1-V3 though it did not meet criteria for STEMI. Troponins 2.85, CKMD 4.7, CK 85. (last troponins from [facility] during NSTEMI 0.19 and 0.12) Repeat CE 6 hours later were unchanged. Cardiology discussed possible heparin gtt, however patient has bleeding into james and had an episode of blood tinged sputum. PT/PTT/INR ordered. Cardiology will see in morning and reassess.    This morning, patient said his chest pain is very minimal. His breathing is improved on NRB.. Will keep in close contact with cardiology about plan.     Tele: NSR 1st degree 70s-90s    MEDICATIONS  (STANDING):  insulin lispro (HumaLOG) corrective regimen sliding scale   SubCutaneous three times a day before meals  insulin lispro (HumaLOG) corrective regimen sliding scale   SubCutaneous at bedtime  dextrose 5%. 1000 milliLiter(s) (50 mL/Hr) IV Continuous <Continuous>  dextrose 50% Injectable 12.5 Gram(s) IV Push once  dextrose 50% Injectable 25 Gram(s) IV Push once  dextrose 50% Injectable 25 Gram(s) IV Push once  aspirin enteric coated 81 milliGRAM(s) Oral daily  tamsulosin 0.4 milliGRAM(s) Oral at bedtime  isosorbide   mononitrate ER Tablet (IMDUR) 30 milliGRAM(s) Oral daily  ranolazine 1000 milliGRAM(s) Oral two times a day  clopidogrel Tablet 75 milliGRAM(s) Oral daily  cefTRIAXone   IVPB 1 Gram(s) IV Intermittent every 24 hours  NIFEdipine XL 60 milliGRAM(s) Oral daily  buDESOnide  80 MICROgram(s)/formoterol 4.5 MICROgram(s) Inhaler 2 Puff(s) Inhalation two times a day  sevelamer hydrochloride 800 milliGRAM(s) Oral three times a day with meals  busPIRone 5 milliGRAM(s) Oral two times a day  cefTRIAXone   IVPB      atorvastatin 80 milliGRAM(s) Oral at bedtime  metoprolol succinate  milliGRAM(s) Oral daily  heparin  Injectable 5000 Unit(s) SubCutaneous every 8 hours    MEDICATIONS  (PRN):  dextrose Gel 1 Dose(s) Oral once PRN Blood Glucose LESS THAN 70 milliGRAM(s)/deciliter  glucagon  Injectable 1 milliGRAM(s) IntraMuscular once PRN Glucose LESS THAN 70 milligrams/deciliter  ALBUTerol    90 MICROgram(s) HFA Inhaler 2 Puff(s) Inhalation every 6 hours PRN Shortness of Breath  acetaminophen   Tablet. 650 milliGRAM(s) Oral every 6 hours PRN pain  oxyCODONE    IR 5 milliGRAM(s) Oral every 4 hours PRN Severe Pain (7 - 10)      T(F): 97.5 (09-16 @ 08:03), Max: 98.1 (09-15 @ 11:44)  HR: 71 (09-16 @ 08:23) (71 - 79)  BP: 129/82 (09-16 @ 08:03) (125/74 - 148/83)  RR: 19 (09-16 @ 08:23) (18 - 19)  SpO2: 96% (09-16 @ 08:23) (94% - 100%)    CAPILLARY BLOOD GLUCOSE  151 (16 Sep 2017 07:33)  137 (15 Sep 2017 21:39)  205 (15 Sep 2017 16:13)  188 (15 Sep 2017 11:15)      I&O's Summary    15 Sep 2017 07:01  -  16 Sep 2017 07:00  --------------------------------------------------------  IN: 720 mL / OUT: 100 mL / NET: 620 mL      PHYSICAL EXAM:  GEN: Appears in no acute distress  HEENT: PERRLA, EOMI and accommodate, neck supple, no lymphadenopathy, no JVD  MOUTH: moist mucous membranes, no exudates or lesions   PULM: bilateral crackles. Pt on NRB satting 94% at time of exam  CV: distant heart sounds, Regular rate and rhythm, S1S2, no murmurs, rubs or gallops  ABD: Soft, nontender to palpation, non-distended, normoactive bowel sounds  EXTREMITIES: No edema, + peripheral pulses  NEURO: AAOx3, moving all four extremities spontaneously  SKIN: No rashes, lesions, hematomas, ecchymosis  TUBES: L peripheral IV, james draining dark urine (casts? blood?) in it low volume, sammi R femoral      LABS & IMAGING:  Labs personally reviewed [X]                        9.4    11.99 )-----------( 294      ( 16 Sep 2017 08:13 )             28.0     Hgb Trend: 9.4<--, 10.4<--, 11.4<--, 11.3<--    09-16    129<L>  |  87<L>  |  103<H>  ----------------------------<  148<H>  6.2<HH>   |  15<L>  |  9.55<H>    Ca    9.7      16 Sep 2017 08:13  Phos  10.7     09-16  Mg     2.6     09-16    TPro  7.3  /  Alb  3.1<L>  /  TBili  0.5  /  DBili  x   /  AST  31  /  ALT  21  /  AlkPhos  99  09-16    Creatinine Trend: 7.96<--, 8.84<--, 9.79<--    PT/INR - ( 16 Sep 2017 07:20 )   PT: 14.9 sec;   INR: 1.37 ratio         PTT - ( 16 Sep 2017 07:20 )  PTT:35.7 sec      Imaging personally reviewed [ ]

## 2017-09-16 NOTE — PROGRESS NOTE ADULT - ASSESSMENT
Patient is a 71 yo male transferred from [facility] for NSTEMI. Patient now s/p 5 stent placement, with new onset CHF, LIN on CKD, pyelonephritis. [facility] is a 71 [facility] Patient now s/p 5 stent placement, with new onset CHF, LIN on CKD, pyelonephritis. Patient with increased chest tightness and SOB o/n, with some EKG changes, not meeting criteria for STEMI. Cardiology evaluating. Patient is a 73 yo male transferred from [facility] for NSTEMI. Patient now s/p 5 stent placement, with new onset CHF, LIN on CKD, pyelonephritis. Patient with increased chest tightness and SOB o/n, with some EKG changes, not meeting criteria for STEMI. Cardiac vs fluid overload in etiology. Cardiology evaluating. Patient is a 71 yo male transferred from [facility] for NSTEMI. Patient now s/p 5 stent placement, with new onset CHF, LIN on CKD, pyelonephritis. Patient with increased chest tightness and SOB o/n, with some EKG changes, not meeting criteria for STEMI. Cardiac vs fluid overload in etiology.

## 2017-09-16 NOTE — PROGRESS NOTE ADULT - ASSESSMENT
72M CAD with multiple stents, DM2, HTN, HLD, COPD, LIN on CKD currently minimal UOP and on HD presented with pyelonephritis c/b LIN on CKD, c/b NSTEMI s/p BABAR x 5 (LAD and RCA) on 9/14/2017 with use of impella device.    Chest pain - EKG not significantly changed accounting for difference in lead placement. Pt with elevated SBP's and evidence of fluid overload. +JVP, +HJR, and orthopnea. trop elevation with nl CK/CKMB. SOB though not hypoxic.  -afterload reduction - cont current BP meds, increase isosorbide mononitrate to 60mg daily  -minimal UOP - recommend additional fluid removal during HD. Pt is scheduled for HD today.    NSTEMI - s/p PCI  -cont DAPT  -cont atorvastatin 80mg daily  -given decreasing Hgb (with recent intervention, reported hematuria), rec evaluating site for possible bleed/trending hgb  -bblocker as above    HFrEF - with evidence of fluid overload. suspect this is the etiology of his symptoms.  -if pt to be on permanent HD, would start ACEi  -cont metoprolol succinate 150mg daily  -fluid removal during HD as above 72M CAD with multiple stent  DM2, HTN, HLD, COPD  LIN on CKD currently minimal UOP and on HD  Presented with pyelonephritis, c/b LIN on CKD, c/b NSTEMI s/p BABAR x 5 (LAD and RCA) on 9/14/2017 with use of Impella device.    Chest pain - EKG not significantly changed, accounting for difference in lead placement. Pt with elevated SBP's and evidence of fluid overload. +JVP, +HJR, and orthopnea. trop elevation with nl CK/CKMB. SOB though not hypoxic.  -afterload reduction - cont current BP meds, increase isosorbide mononitrate to 60mg daily  -minimal UOP - recommend additional fluid removal during HD, if possible. Pt is scheduled for HD today.    NSTEMI - s/p PCI  -cont DAPT  -cont atorvastatin 80mg daily  -given decreasing Hgb (with recent intervention, reported hematuria), rec evaluating site for possible bleed/trending hgb  -bblocker as above    HFrEF - with evidence of fluid overload. suspect this is the etiology of his symptoms.  -if pt to be on permanent HD, would start ACEi  -cont metoprolol succinate 150mg daily  -continue fluid removal during HD, as above

## 2017-09-16 NOTE — CHART NOTE - NSCHARTNOTEFT_GEN_A_CORE
RRT called for respiratory distress.  Per RN, pt had been on NRB all night for SOB, titrated down to ventimask, but after some time he began to c/o SOB again (without hypoxia), placed back on NRB.   Upon arrival, pt appeared relatively comfortable, satting at 95% O2 on 100% NRB, RR 18 not tachypneic, not using accessory muscles, bilateral crackles to mid-lung without wheezing. RN who is Swiss-speaking provided translation- pt reported SOB improved with NRB. No events on telemetry.    Mr. Lara is a 73yo M with complicated medical hx, newly diagnosed HFrEF and ESRD now on HD via femoral shiley, CAD s/p 5 PCI 2 days ago. CXR in AM showed diffuse pulmonary vascular congestion suggestive of pulmonary edema. Labs also remarkable for hyperkalemia to 6.3 and hyperphosphatemia to 10.7. Pt is planned for urgent HD soon.     Plan:   - HD now  - c/w NRB for now  - consider BiPAP if respiratory sxs worsen  - D/w primary Team 4 intern  - D/w hospitalist Dr. Dusty Chavez, PGY3  MAR  Spectra [phone]

## 2017-09-16 NOTE — CHART NOTE - NSCHARTNOTEFT_GEN_A_CORE
Notified by nurse a little after 11pm that patient was having some chest pain and tightness. Went to assess patient with family at bedside, reported that chest pain subsided when on the NRB. Obtained EKG which in comparison with prior EKG shows some degree of ST changes in V1 and V3. Ordered stat Cardiac Enzymes and called cards fellow on call. As per conversation, no emergent or revealing ST elevation EKG changes that would warrant emergent cath.     Called cards fellow again at midnight when Troponin came back at 2.85 with CKMB at 4.7 and CK 85. Last Troponin level was at OSH (09/12) which was 0.19 from 0.12 at an earlier draw. EKG repeated    Repeat Enzymes ordered at 4:30 AM along with EKG Notified by nurse a little after 11pm that patient was having some chest pain and tightness. Went to assess patient with family at bedside, reported that chest pain subsided when on the NRB. Obtained EKG which in comparison with prior EKG shows some degree of ST changes in V1 and V3. Ordered stat Cardiac Enzymes and called cards fellow on call, Dr. Padilla. From conversation, no emerging changes that would warrant emergent cath. Patient initially came in with NSTEMI s/p BABAR x 5 to LAD and RCA     Called cards fellow again at midnight when Troponin came back at 2.85 with CKMB at 4.7 and CK 85. Last Troponin level was at OSH (09/12) which was 0.19 from 0.12 at an earlier draw. EKG repeated. Cardiology fellow came up to see the patient, who denies any active chest pain and reports some shortness of breath but better on the NRB. Repeat EKG showing some elevation in V2 and V3, but not meeting STEMI criteria. Pattern could be his baseline post stent. Will continue to assess and monitor with repeat enzymes ordered for 4:30 AM with EKG. Notified by nurse a little after 11pm that patient was having some chest pain and tightness. Went to assess patient with family at bedside, reported that chest pain subsided when on the NRB. Obtained EKG which in comparison with prior EKG shows some degree of ST changes in V1 and V3. Ordered stat Cardiac Enzymes and called cards fellow on call, Dr. Padilla. From conversation, no emerging changes that would warrant emergent cath. Patient initially came in with NSTEMI s/p BABAR x 5 to LAD and RCA     Called cards fellow again at midnight when Troponin came back at 2.85 with CKMB at 4.7 and CK 85. Last Troponin level was at OSH (09/12) which was 0.19 from 0.12 at an earlier draw. EKG repeated. Cardiology fellow came up to see the patient, who denies any active chest pain and reports some shortness of breath but better on the NRB. Repeat EKG showing some elevation in V2 and V3, but not meeting STEMI criteria. Pattern could be his baseline post stent. Will continue to assess and monitor with repeat enzymes ordered for 4:30 AM with EKG.    4:30 AM EKG looks essentially unchanged, discussed imaging with cardiac fellow. Will follow up cardiac enzymes. Notified by nurse a little after 11pm that patient was having some chest pain and tightness. Went to assess patient with family at bedside, reported that chest pain subsided when on the NRB. Obtained EKG which in comparison with prior EKG shows some degree of ST changes in V1 and V3. Ordered stat Cardiac Enzymes and called cards fellow on call, Dr. Padilla. From conversation, no emerging changes that would warrant emergent cath. Patient initially came in with NSTEMI s/p BABAR x 5 to LAD and RCA     Called cards fellow again at midnight when Troponin came back at 2.85 with CKMB at 4.7 and CK 85. Last Troponin level was at OSH (09/12) which was 0.19 from 0.12 at an earlier draw. EKG repeated. Cardiology fellow came up to see the patient, who denies any active chest pain and reports some shortness of breath but better on the NRB. Repeat EKG showing some elevation in V2 and V3, but not meeting STEMI criteria. Pattern could be his baseline post stent. Will continue to assess and monitor with repeat enzymes ordered for 4:30 AM with EKG.    4:30 AM EKG looks essentially unchanged, discussed imaging with cardiac fellow. No remarkable changes in cardiac enzymes, Troponin stayed the same with very minimal change in CKMB but still below threshold. Notified by nurse a little after 11pm that patient was having some chest pain and tightness. Went to assess patient with family at bedside, reported that chest pain subsided when on the NRB. Obtained EKG which in comparison with prior EKG shows some degree of ST changes in V1 and V3. Ordered stat Cardiac Enzymes and called cards fellow on call, Dr. Padilla. From conversation, no emerging changes that would warrant emergent cath. Patient initially came in with NSTEMI s/p BABAR x 5 to LAD and RCA     Called cards fellow again at midnight when Troponin came back at 2.85 with CKMB at 4.7 and CK 85. Last Troponin level was at OSH (09/12) which was 0.19 from 0.12 at an earlier draw. EKG repeated. Cardiology fellow came up to see the patient, who denies any active chest pain and reports some shortness of breath but better on the NRB. Repeat EKG showing some elevation in V2 and V3, but not meeting STEMI criteria. Pattern could be his baseline post stent. Will continue to assess and monitor with repeat enzymes ordered for 4:30 AM with EKG.    4:30 AM EKG looks essentially unchanged, discussed imaging with cardiac fellow. No remarkable changes in cardiac enzymes, Troponin stayed the same with very minimal change in CKMB but still below threshold.    Discussed the possibility of starting heparin drip but notified by nurse that patient has blood in his james and had an episode of blood tinged sputum. PT/INR, PTT stat ordered. Spoke with fellow who will sign out to day team, regarding heparin drip.

## 2017-09-16 NOTE — PROGRESS NOTE ADULT - SUBJECTIVE AND OBJECTIVE BOX
Subjective  Friend translating at bedside.  Patient seen and examined. Patient complaining of chest pain overnight, improved this AM with non-rebreather mask, cardiology closely following. States right flank pain is currently mild. No n/v. No fevers. Tolerating diet.     Objective  T(C): 36.4 (09-16-17 @ 08:03), Max: 36.7 (09-15-17 @ 11:44)  HR: 71 (09-16-17 @ 08:23) (71 - 79)  BP: 129/82 (09-16-17 @ 08:03) (125/74 - 148/83)  ABP: --  ABP(mean): --  RR: 19 (09-16-17 @ 08:23) (18 - 19)  SpO2: 96% (09-16-17 @ 08:23) (94% - 100%)  Wt(kg): --  CVP(mm Hg): --      09-15 @ 07:01  -  09-16 @ 07:00  --------------------------------------------------------  IN:    Oral Fluid: 720 mL  Total IN: 720 mL    OUT:    Indwelling Catheter - Urethral: 100 mL  Total OUT: 100 mL    Total NET: 620 mL    General: NAD  Abdomen: soft/non-tender/non-distended  : No CVA tenderness,   Extremities: Left Groin hematoma from dialysis catheter, Patient has new dialysis catheter in right groin, femoral vein

## 2017-09-16 NOTE — PROGRESS NOTE ADULT - ASSESSMENT
73 y/o M with Right functional solitary kidney now obstructed with ureteral stone c/b renal failure requiring initiation of hemodialysis,  new NSTEMI s/p cardiac cath with 5 stents. Patient has extensive high risk cardiac disease with multiple prior stents.    --patient's represent a high risk nephrostomy tube given plavix  --currently still very high risk for general anesthesia with severe cardiac disease, on anticoagulation  --continue to optimize medically, continue flomax, strict I/Os, dialysis as needed  --Follow up cardiology/medicine/nephrology.  --if able to be optimized for OR, will consider ureteral stent insertion on this admission

## 2017-09-17 DIAGNOSIS — D64.9 ANEMIA, UNSPECIFIED: ICD-10-CM

## 2017-09-17 LAB
ALBUMIN SERPL ELPH-MCNC: 3.4 G/DL — SIGNIFICANT CHANGE UP (ref 3.3–5)
ALP SERPL-CCNC: 106 U/L — SIGNIFICANT CHANGE UP (ref 40–120)
ALT FLD-CCNC: 23 U/L — SIGNIFICANT CHANGE UP (ref 10–45)
ANION GAP SERPL CALC-SCNC: 23 MMOL/L — HIGH (ref 5–17)
ANION GAP SERPL CALC-SCNC: 24 MMOL/L — HIGH (ref 5–17)
ANION GAP SERPL CALC-SCNC: 28 MMOL/L — HIGH (ref 5–17)
AST SERPL-CCNC: 29 U/L — SIGNIFICANT CHANGE UP (ref 10–40)
BILIRUB SERPL-MCNC: 0.5 MG/DL — SIGNIFICANT CHANGE UP (ref 0.2–1.2)
BUN SERPL-MCNC: 48 MG/DL — HIGH (ref 7–23)
BUN SERPL-MCNC: 72 MG/DL — HIGH (ref 7–23)
BUN SERPL-MCNC: 97 MG/DL — HIGH (ref 7–23)
CALCIUM SERPL-MCNC: 9.3 MG/DL — SIGNIFICANT CHANGE UP (ref 8.4–10.5)
CALCIUM SERPL-MCNC: 9.7 MG/DL — SIGNIFICANT CHANGE UP (ref 8.4–10.5)
CALCIUM SERPL-MCNC: 9.7 MG/DL — SIGNIFICANT CHANGE UP (ref 8.4–10.5)
CHLORIDE SERPL-SCNC: 87 MMOL/L — LOW (ref 96–108)
CHLORIDE SERPL-SCNC: 90 MMOL/L — LOW (ref 96–108)
CHLORIDE SERPL-SCNC: 90 MMOL/L — LOW (ref 96–108)
CO2 SERPL-SCNC: 16 MMOL/L — LOW (ref 22–31)
CO2 SERPL-SCNC: 20 MMOL/L — LOW (ref 22–31)
CO2 SERPL-SCNC: 22 MMOL/L — SIGNIFICANT CHANGE UP (ref 22–31)
CREAT SERPL-MCNC: 5.05 MG/DL — HIGH (ref 0.5–1.3)
CREAT SERPL-MCNC: 7.22 MG/DL — HIGH (ref 0.5–1.3)
CREAT SERPL-MCNC: 9.7 MG/DL — HIGH (ref 0.5–1.3)
GLUCOSE SERPL-MCNC: 127 MG/DL — HIGH (ref 70–99)
GLUCOSE SERPL-MCNC: 144 MG/DL — HIGH (ref 70–99)
GLUCOSE SERPL-MCNC: 97 MG/DL — SIGNIFICANT CHANGE UP (ref 70–99)
HCT VFR BLD CALC: 27.4 % — LOW (ref 39–50)
HGB BLD-MCNC: 9.2 G/DL — LOW (ref 13–17)
MAGNESIUM SERPL-MCNC: 2.7 MG/DL — HIGH (ref 1.6–2.6)
MCHC RBC-ENTMCNC: 26.8 PG — LOW (ref 27–34)
MCHC RBC-ENTMCNC: 33.6 GM/DL — SIGNIFICANT CHANGE UP (ref 32–36)
MCV RBC AUTO: 79.9 FL — LOW (ref 80–100)
PHOSPHATE SERPL-MCNC: 10.9 MG/DL — HIGH (ref 2.5–4.5)
PLATELET # BLD AUTO: 306 K/UL — SIGNIFICANT CHANGE UP (ref 150–400)
POTASSIUM SERPL-MCNC: 3.5 MMOL/L — SIGNIFICANT CHANGE UP (ref 3.5–5.3)
POTASSIUM SERPL-MCNC: 5 MMOL/L — SIGNIFICANT CHANGE UP (ref 3.5–5.3)
POTASSIUM SERPL-MCNC: 5.9 MMOL/L — HIGH (ref 3.5–5.3)
POTASSIUM SERPL-SCNC: 3.5 MMOL/L — SIGNIFICANT CHANGE UP (ref 3.5–5.3)
POTASSIUM SERPL-SCNC: 5 MMOL/L — SIGNIFICANT CHANGE UP (ref 3.5–5.3)
POTASSIUM SERPL-SCNC: 5.9 MMOL/L — HIGH (ref 3.5–5.3)
PROT SERPL-MCNC: 7.4 G/DL — SIGNIFICANT CHANGE UP (ref 6–8.3)
RBC # BLD: 3.43 M/UL — LOW (ref 4.2–5.8)
RBC # FLD: 13.7 % — SIGNIFICANT CHANGE UP (ref 10.3–14.5)
SODIUM SERPL-SCNC: 131 MMOL/L — LOW (ref 135–145)
SODIUM SERPL-SCNC: 134 MMOL/L — LOW (ref 135–145)
SODIUM SERPL-SCNC: 135 MMOL/L — SIGNIFICANT CHANGE UP (ref 135–145)
WBC # BLD: 12.62 K/UL — HIGH (ref 3.8–10.5)
WBC # FLD AUTO: 12.62 K/UL — HIGH (ref 3.8–10.5)

## 2017-09-17 PROCEDURE — 99232 SBSQ HOSP IP/OBS MODERATE 35: CPT

## 2017-09-17 PROCEDURE — 93010 ELECTROCARDIOGRAM REPORT: CPT

## 2017-09-17 PROCEDURE — 99233 SBSQ HOSP IP/OBS HIGH 50: CPT | Mod: GC

## 2017-09-17 RX ORDER — ALBUTEROL 90 UG/1
10 AEROSOL, METERED ORAL ONCE
Qty: 0 | Refills: 0 | Status: COMPLETED | OUTPATIENT
Start: 2017-09-17 | End: 2017-09-17

## 2017-09-17 RX ORDER — INSULIN HUMAN 100 [IU]/ML
10 INJECTION, SOLUTION SUBCUTANEOUS ONCE
Qty: 0 | Refills: 0 | Status: COMPLETED | OUTPATIENT
Start: 2017-09-17 | End: 2017-09-17

## 2017-09-17 RX ORDER — DEXTROSE 50 % IN WATER 50 %
50 SYRINGE (ML) INTRAVENOUS ONCE
Qty: 0 | Refills: 0 | Status: COMPLETED | OUTPATIENT
Start: 2017-09-17 | End: 2017-09-17

## 2017-09-17 RX ORDER — ALBUTEROL 90 UG/1
2.5 AEROSOL, METERED ORAL ONCE
Qty: 0 | Refills: 0 | Status: DISCONTINUED | OUTPATIENT
Start: 2017-09-17 | End: 2017-09-17

## 2017-09-17 RX ORDER — SODIUM POLYSTYRENE SULFONATE 4.1 MEQ/G
30 POWDER, FOR SUSPENSION ORAL ONCE
Qty: 0 | Refills: 0 | Status: COMPLETED | OUTPATIENT
Start: 2017-09-17 | End: 2017-09-17

## 2017-09-17 RX ADMIN — SEVELAMER CARBONATE 800 MILLIGRAM(S): 2400 POWDER, FOR SUSPENSION ORAL at 08:44

## 2017-09-17 RX ADMIN — CEFTRIAXONE 100 GRAM(S): 500 INJECTION, POWDER, FOR SOLUTION INTRAMUSCULAR; INTRAVENOUS at 22:10

## 2017-09-17 RX ADMIN — Medication 81 MILLIGRAM(S): at 12:12

## 2017-09-17 RX ADMIN — HEPARIN SODIUM 5000 UNIT(S): 5000 INJECTION INTRAVENOUS; SUBCUTANEOUS at 22:09

## 2017-09-17 RX ADMIN — ATORVASTATIN CALCIUM 80 MILLIGRAM(S): 80 TABLET, FILM COATED ORAL at 22:03

## 2017-09-17 RX ADMIN — Medication 60 MILLIGRAM(S): at 05:32

## 2017-09-17 RX ADMIN — Medication 2: at 12:11

## 2017-09-17 RX ADMIN — Medication 1: at 16:46

## 2017-09-17 RX ADMIN — Medication 5 MILLIGRAM(S): at 05:32

## 2017-09-17 RX ADMIN — HEPARIN SODIUM 5000 UNIT(S): 5000 INJECTION INTRAVENOUS; SUBCUTANEOUS at 05:40

## 2017-09-17 RX ADMIN — Medication 150 MILLIGRAM(S): at 05:32

## 2017-09-17 RX ADMIN — HEPARIN SODIUM 5000 UNIT(S): 5000 INJECTION INTRAVENOUS; SUBCUTANEOUS at 16:48

## 2017-09-17 RX ADMIN — Medication 1: at 08:42

## 2017-09-17 RX ADMIN — RANOLAZINE 1000 MILLIGRAM(S): 500 TABLET, FILM COATED, EXTENDED RELEASE ORAL at 05:33

## 2017-09-17 RX ADMIN — CLOPIDOGREL BISULFATE 75 MILLIGRAM(S): 75 TABLET, FILM COATED ORAL at 12:12

## 2017-09-17 RX ADMIN — SEVELAMER CARBONATE 800 MILLIGRAM(S): 2400 POWDER, FOR SUSPENSION ORAL at 12:12

## 2017-09-17 RX ADMIN — BUDESONIDE AND FORMOTEROL FUMARATE DIHYDRATE 2 PUFF(S): 160; 4.5 AEROSOL RESPIRATORY (INHALATION) at 22:06

## 2017-09-17 RX ADMIN — TAMSULOSIN HYDROCHLORIDE 0.4 MILLIGRAM(S): 0.4 CAPSULE ORAL at 22:03

## 2017-09-17 RX ADMIN — Medication 5 MILLIGRAM(S): at 22:03

## 2017-09-17 RX ADMIN — ISOSORBIDE MONONITRATE 30 MILLIGRAM(S): 60 TABLET, EXTENDED RELEASE ORAL at 22:03

## 2017-09-17 RX ADMIN — RANOLAZINE 1000 MILLIGRAM(S): 500 TABLET, FILM COATED, EXTENDED RELEASE ORAL at 22:04

## 2017-09-17 RX ADMIN — BUDESONIDE AND FORMOTEROL FUMARATE DIHYDRATE 2 PUFF(S): 160; 4.5 AEROSOL RESPIRATORY (INHALATION) at 05:42

## 2017-09-17 NOTE — PROVIDER CONTACT NOTE (MEDICATION) - SITUATION
Potassium 5.9. Dextrose 50% injectable, Insulin Regular 10 units, Kayexalate & Albuterol ordered by Dr. Stefanie Yanez.

## 2017-09-17 NOTE — PROGRESS NOTE ADULT - ATTENDING COMMENTS
Pt fluid status much improved on examination today, no on NC. BMP with elevation in potassium to 5.6. Check ECG, contact nephrology regarding possible dialysis for today. Give Hyperkalemia cocktail in addition to kayexylate while awaiting renal.

## 2017-09-17 NOTE — PROGRESS NOTE ADULT - PROBLEM SELECTOR PLAN 1
PMH CAD w/ multiple stents. Diagnostic cath 9/12 diffuse 99% stenosis in mid LAD, 30% stenosis in mid PCX, 80% stenosis in mid RCA, 85% distal RCA. Therapeutic cath 9/14 - 2 stents in LAD, 3 in RCA with Impella assistance.   --continues to have intermittent chest pain, worse when overloaded  --Jocelyn downtrending, will stop checking  - c/w ASA, plavix, lipitor, BB  - c/w ranexa, imdur  - c/w procardia xl  - appreciate cards recs

## 2017-09-17 NOTE — PROGRESS NOTE ADULT - SUBJECTIVE AND OBJECTIVE BOX
[facility]      24H hour events: no acute events overnight, pt denies CP, SOB, syncope. tele: NSR 70-80s    MEDICATIONS:  aspirin enteric coated 81 milliGRAM(s) Oral daily  tamsulosin 0.4 milliGRAM(s) Oral at bedtime  isosorbide   mononitrate ER Tablet (IMDUR) 30 milliGRAM(s) Oral daily  ranolazine 1000 milliGRAM(s) Oral two times a day  clopidogrel Tablet 75 milliGRAM(s) Oral daily  NIFEdipine XL 60 milliGRAM(s) Oral daily  metoprolol succinate  milliGRAM(s) Oral daily  heparin  Injectable 5000 Unit(s) SubCutaneous every 8 hours    cefTRIAXone   IVPB 1 Gram(s) IV Intermittent every 24 hours  cefTRIAXone   IVPB        buDESOnide  80 MICROgram(s)/formoterol 4.5 MICROgram(s) Inhaler 2 Puff(s) Inhalation two times a day  ALBUTerol    90 MICROgram(s) HFA Inhaler 2 Puff(s) Inhalation every 6 hours PRN    busPIRone 5 milliGRAM(s) Oral two times a day  acetaminophen   Tablet. 650 milliGRAM(s) Oral every 6 hours PRN  oxyCODONE    IR 5 milliGRAM(s) Oral every 4 hours PRN      insulin lispro (HumaLOG) corrective regimen sliding scale   SubCutaneous three times a day before meals  insulin lispro (HumaLOG) corrective regimen sliding scale   SubCutaneous at bedtime  dextrose Gel 1 Dose(s) Oral once PRN  dextrose 50% Injectable 12.5 Gram(s) IV Push once  dextrose 50% Injectable 25 Gram(s) IV Push once  dextrose 50% Injectable 25 Gram(s) IV Push once  glucagon  Injectable 1 milliGRAM(s) IntraMuscular once PRN  atorvastatin 80 milliGRAM(s) Oral at bedtime    dextrose 5%. 1000 milliLiter(s) IV Continuous <Continuous>      REVIEW OF SYSTEMS:  pt denies CP, SOB, palpitations, syncope,  n/v/abd pain, f/c, HA, AMS      PHYSICAL EXAM:  T(C): 36.8 (09-17-17 @ 08:48), Max: 37.2 (09-16-17 @ 20:24)  HR: 73 (09-17-17 @ 08:48) (73 - 78)  BP: 115/73 (09-17-17 @ 08:48) (108/74 - 135/77)  RR: 19 (09-17-17 @ 08:48) (18 - 24)  SpO2: 99% (09-17-17 @ 08:48) (92% - 100%)  Wt(kg): --  I&O's Summary    16 Sep 2017 07:01  -  17 Sep 2017 07:00  --------------------------------------------------------  IN: 780 mL / OUT: 3525 mL / NET: -2745 mL    17 Sep 2017 07:01  -  17 Sep 2017 11:02  --------------------------------------------------------  IN: 220 mL / OUT: 0 mL / NET: 220 mL        Appearance: Normal	  HEENT:   Normal oral mucosa, PERRL, EOMI	  Lymphatic: No lymphadenopathy  Cardiovascular: Normal S1 S2, No JVD, No murmurs, No edema  Respiratory: Coarse bs b/l  Psychiatry: A & O x 3, Mood & affect appropriate  Gastrointestinal:  Soft, Non-tender, + BS	  Skin: No rashes, No ecchymoses, No cyanosis	  Neurologic: Non-focal  Extremities: Normal range of motion, No clubbing, cyanosis or edema  Vascular: Peripheral pulses palpable 2+ bilaterally        LABS:	 	    CBC Full  -  ( 17 Sep 2017 08:27 )  WBC Count : 12.62 K/uL  Hemoglobin : 9.2 g/dL  Hematocrit : 27.4 %  Platelet Count - Automated : 306 K/uL  Mean Cell Volume : 79.9 fl  Mean Cell Hemoglobin : 26.8 pg  Mean Cell Hemoglobin Concentration : 33.6 gm/dL  Auto Neutrophil # : x  Auto Lymphocyte # : x  Auto Monocyte # : x  Auto Eosinophil # : x  Auto Basophil # : x  Auto Neutrophil % : x  Auto Lymphocyte % : x  Auto Monocyte % : x  Auto Eosinophil % : x  Auto Basophil % : x    09-17    131<L>  |  87<L>  |  97<H>  ----------------------------<  144<H>  5.9<H>   |  16<L>  |  9.70<H>  09-16    129<L>  |  87<L>  |  103<H>  ----------------------------<  148<H>  6.2<HH>   |  15<L>  |  9.55<H>    Ca    9.7      17 Sep 2017 08:30  Ca    9.7      16 Sep 2017 08:13  Phos  10.9     09-17  Phos  10.7     09-16  Mg     2.7     09-17  Mg     2.6     09-16    TPro  7.4  /  Alb  3.4  /  TBili  0.5  /  DBili  x   /  AST  29  /  ALT  23  /  AlkPhos  106  09-17  TPro  7.3  /  Alb  3.1<L>  /  TBili  0.5  /  DBili  x   /  AST  31  /  ALT  21  /  AlkPhos  99  09-16      proBNP:   Lipid Profile:   HgA1c:   TSH:       CARDIAC MARKERS:            TELEMETRY: 	    ECG:  	  RADIOLOGY:  OTHER: 	    PREVIOUS DIAGNOSTIC TESTING:    [ ] Echocardiogram: reviewed  [ ]  Catheterization: reviewed  [ ] Stress Test:  	  	  ASSESSMENT/PLAN: 	    72M CAD with multiple stent, DM2, HTN, HLD, COPD, LIN on CKD on HD, initially Presented with pyelonephritis, course c/b LIN on CKD, c/b NSTEMI s/p impella assisted BABAR x 5 (LAD and RCA) on 9/14/2017       1. NSTEMI - s/p PCI  -cont DAPT, cont statin 80mg daily  -cont bbpatsy boschexa  -lifevest eval    2. HFrEF - with evidence of fluid overload. suspect this is the etiology of his symptoms.  -last HD yesterday, with fluid removal, pt with improved symptoms of SOB, LE edema trace  -cont metoprolol succinate 150mg daily, procardia, imdur  -continue fluid removal during HD, as above  -monitor on tele      Gray Kilpatrick [place] [id number] [facility]    24H hour events: no acute events overnight, pt denies CP, SOB, syncope. tele: NSR 70-80s    MEDICATIONS:  aspirin enteric coated 81 milliGRAM(s) Oral daily  tamsulosin 0.4 milliGRAM(s) Oral at bedtime  isosorbide   mononitrate ER Tablet (IMDUR) 30 milliGRAM(s) Oral daily  ranolazine 1000 milliGRAM(s) Oral two times a day  clopidogrel Tablet 75 milliGRAM(s) Oral daily  NIFEdipine XL 60 milliGRAM(s) Oral daily  metoprolol succinate  milliGRAM(s) Oral daily  heparin  Injectable 5000 Unit(s) SubCutaneous every 8 hours  cefTRIAXone   IVPB 1 Gram(s) IV Intermittent every 24 hours  buDESOnide  80 MICROgram(s)/formoterol 4.5 MICROgram(s) Inhaler 2 Puff(s) Inhalation two times a day  ALBUTerol    90 MICROgram(s) HFA Inhaler 2 Puff(s) Inhalation every 6 hours PRN  busPIRone 5 milliGRAM(s) Oral two times a day  acetaminophen   Tablet. 650 milliGRAM(s) Oral every 6 hours PRN  oxyCODONE    IR 5 milliGRAM(s) Oral every 4 hours PRN  insulin lispro (HumaLOG) corrective regimen sliding scale   SubCutaneous three times a day before meals  insulin lispro (HumaLOG) corrective regimen sliding scale   SubCutaneous at bedtime  dextrose Gel 1 Dose(s) Oral once PRN  dextrose 50% Injectable 12.5 Gram(s) IV Push once  dextrose 50% Injectable 25 Gram(s) IV Push once  dextrose 50% Injectable 25 Gram(s) IV Push once  glucagon  Injectable 1 milliGRAM(s) IntraMuscular once PRN  atorvastatin 80 milliGRAM(s) Oral at bedtime  dextrose 5%. 1000 milliLiter(s) IV Continuous <Continuous>    REVIEW OF SYSTEMS:   pt denies CP, SOB, palpitations, syncope,  n/v/abd pain, f/c, HA, AMS  PHYSICAL EXAM:  T(C): 36.8 (09-17-17 @ 08:48), Max: 37.2 (09-16-17 @ 20:24)  HR: 73 (09-17-17 @ 08:48) (73 - 78)  BP: 115/73 (09-17-17 @ 08:48) (108/74 - 135/77)  RR: 19 (09-17-17 @ 08:48) (18 - 24)  SpO2: 99% (09-17-17 @ 08:48) (92% - 100%)    Appearance: Normal	  HEENT:   Normal oral mucosa, PERRL, EOMI	  Lymphatic: No lymphadenopathy  Cardiovascular: Normal S1 S2, No JVD, No murmurs, No edema  Respiratory: Coarse bs b/l  Psychiatry: A & O x 3, Mood & affect appropriate  Gastrointestinal:  Soft, Non-tender, + BS	  Skin: No rashes, No ecchymoses, No cyanosis	  Neurologic: Non-focal  Extremities: Normal range of motion, No clubbing, cyanosis or edema  Vascular: Peripheral pulses palpable 2+ bilaterally    I&O's Summary    16 Sep 2017 07:01  -  17 Sep 2017 07:00  --------------------------------------------------------  IN: 780 mL / OUT: 3525 mL / NET: -2745 mL    17 Sep 2017 07:01  -  17 Sep 2017 11:02  --------------------------------------------------------  IN: 220 mL / OUT: 0 mL / NET: 220 mL    LABS:	 	    CBC Full  -  ( 17 Sep 2017 08:27 )  WBC Count : 12.62 K/uL  Hemoglobin : 9.2 g/dL  Hematocrit : 27.4 %  Platelet Count - Automated : 306 K/uL  Mean Cell Volume : 79.9 fl  Mean Cell Hemoglobin : 26.8 pg  Mean Cell Hemoglobin Concentration : 33.6 gm/dL    09-17  131<L>  |  87<L>  |  97<H>  ----------------------------<  144<H>  5.9<H>   |  16<L>  |  9.70<H>    09-16  129<L>  |  87<L>  |  103<H>  ----------------------------<  148<H>  6.2<HH>   |  15<L>  |  9.55<H>    Ca    9.7      17 Sep 2017 08:30  Ca    9.7      16 Sep 2017 08:13  Phos  10.9     09-17  Phos  10.7     09-16  Mg     2.7     09-17  Mg     2.6     09-16    TPro  7.4  /  Alb  3.4  /  TBili  0.5  /  DBili  x   /  AST  29  /  ALT  23  /  AlkPhos  106  09-17  TPro  7.3  /  Alb  3.1<L>  /  TBili  0.5  /  DBili  x   /  AST  31  /  ALT  21  /  AlkPhos  99  09-16    	  ASSESSMENT/PLAN: 	    72M CAD with multiple stent, DM2, HTN, HLD, COPD, LIN on CKD on HD, initially Presented with pyelonephritis, course c/b LIN on CKD, c/b NSTEMI s/p impella assisted BABAR x 5 (LAD and RCA) on 9/14/2017       1. NSTEMI - s/p PCI  -cont DAPT, cont statin 80mg daily  -cont bblocker, ranexa  -lifevest eval    2. HFrEF - with evidence of fluid overload. suspect this is the etiology of his symptoms.  -last HD yesterday, with fluid removal, pt with improved symptoms of SOB, LE edema trace  -cont metoprolol succinate 150mg daily, procardia, imdur  -continue fluid removal during HD, as above  -monitor on tele      Gray Kilpatrick [place] [id number]  Patient seen and examined; discussed with cardiology fellow. Hx., exam and labs as above.  I agree with the assessment and recommendations.  Erick Roy M.D.  Cardiology Consult Service  [phone] or [phone]

## 2017-09-17 NOTE — PROGRESS NOTE ADULT - PROBLEM SELECTOR PLAN 6
- Patient started on ceftriaxone ~9/6 for pyelonephritis. Day 12/14 today.  - c/w ceftriaxone  - ID following

## 2017-09-17 NOTE — PROGRESS NOTE ADULT - PROBLEM SELECTOR PLAN 3
--Acute renal failure on CKD in setting of obstructed R kidney (L kidney atrophic).   --c/w HD, received yesterday  --R fem shiley one week old  --readdress more permanent access on MONDAY with IR now that patient more stable following cardiac interventions  --pt very high bleeding risk for urological intervention vs perc nephrostomy tube. Unlikely to be general anesthesia candidate.   - mild hematuria, started with DAPTs, continue to monitor, urology following

## 2017-09-17 NOTE — CHART NOTE - NSCHARTNOTEFT_GEN_A_CORE
Hyperkalemia 5.9 today on AM labs (no hemolysis). He was aggressively dialyzed to remove potassium and volume yesterday.     Spoke with nephrology on call [phone]. Patient without any concerning EKG changes, appears similar to prior. Also not significantly volume overloaded. Would prefer not to dialyze today, so will give temporizing measures including kayexalate. Repeat BMP this evening.     Stefanie Yanez MD  [phone]  (After 12 noon, covered by night float pager [phone]).

## 2017-09-17 NOTE — PROGRESS NOTE ADULT - SUBJECTIVE AND OBJECTIVE BOX
Subjective: improved dyspnea after HD/UF yest. Repeat BW noted. Appetite is diminished      MEDICATIONS  (STANDING):  insulin lispro (HumaLOG) corrective regimen sliding scale   SubCutaneous three times a day before meals  insulin lispro (HumaLOG) corrective regimen sliding scale   SubCutaneous at bedtime  dextrose 5%. 1000 milliLiter(s) (50 mL/Hr) IV Continuous <Continuous>  dextrose 50% Injectable 12.5 Gram(s) IV Push once  dextrose 50% Injectable 25 Gram(s) IV Push once  dextrose 50% Injectable 25 Gram(s) IV Push once  aspirin enteric coated 81 milliGRAM(s) Oral daily  tamsulosin 0.4 milliGRAM(s) Oral at bedtime  isosorbide   mononitrate ER Tablet (IMDUR) 30 milliGRAM(s) Oral daily  ranolazine 1000 milliGRAM(s) Oral two times a day  clopidogrel Tablet 75 milliGRAM(s) Oral daily  cefTRIAXone   IVPB 1 Gram(s) IV Intermittent every 24 hours  NIFEdipine XL 60 milliGRAM(s) Oral daily  buDESOnide  80 MICROgram(s)/formoterol 4.5 MICROgram(s) Inhaler 2 Puff(s) Inhalation two times a day  sevelamer hydrochloride 800 milliGRAM(s) Oral three times a day with meals  busPIRone 5 milliGRAM(s) Oral two times a day  cefTRIAXone   IVPB      atorvastatin 80 milliGRAM(s) Oral at bedtime  metoprolol succinate  milliGRAM(s) Oral daily  heparin  Injectable 5000 Unit(s) SubCutaneous every 8 hours  insulin regular  human recombinant 10 Unit(s) IV Push once  dextrose 50% Injectable 50 milliLiter(s) IV Push once  sodium polystyrene sulfonate Suspension 30 Gram(s) Oral once  ALBUTerol    0.083%. 10 milliGRAM(s) Nebulizer once    MEDICATIONS  (PRN):  dextrose Gel 1 Dose(s) Oral once PRN Blood Glucose LESS THAN 70 milliGRAM(s)/deciliter  glucagon  Injectable 1 milliGRAM(s) IntraMuscular once PRN Glucose LESS THAN 70 milligrams/deciliter  ALBUTerol    90 MICROgram(s) HFA Inhaler 2 Puff(s) Inhalation every 6 hours PRN Shortness of Breath  acetaminophen   Tablet. 650 milliGRAM(s) Oral every 6 hours PRN pain  oxyCODONE    IR 5 milliGRAM(s) Oral every 4 hours PRN Severe Pain (7 - 10)          T(C): 36.3 (09-17-17 @ 11:52), Max: 37.2 (09-16-17 @ 20:24)  HR: 73 (09-17-17 @ 12:14) (72 - 78)  BP: 105/65 (09-17-17 @ 12:14) (105/65 - 135/77)  RR: 18 (09-17-17 @ 12:14) (18 - 20)  SpO2: 98% (09-17-17 @ 12:14) (94% - 100%)  Wt(kg): --        I&O's Detail    16 Sep 2017 07:01  -  17 Sep 2017 07:00  --------------------------------------------------------  IN:    Oral Fluid: 780 mL  Total IN: 780 mL    OUT:    Indwelling Catheter - Urethral: 725 mL    Other: 2800 mL  Total OUT: 3525 mL    Total NET: -2745 mL      17 Sep 2017 07:01  -  17 Sep 2017 12:59  --------------------------------------------------------  IN:    Oral Fluid: 220 mL  Total IN: 220 mL    OUT:    Indwelling Catheter - Urethral: 50 mL  Total OUT: 50 mL    Total NET: 170 mL               PHYSICAL EXAM:    GENERAL: more comfortable  EYES: EOMI, PERRLA, conjunctiva and sclera clear  NECK: Supple, no inc in JVP  CHEST/LUNG: crackles  HEART: S1S2  ABDOMEN: Soft, Nontender, Nondistended; Bowel sounds present  EXTREMITIES: trace edema   ACCESS: R fem QC        LABS:  CBC Full  -  ( 17 Sep 2017 08:27 )  WBC Count : 12.62 K/uL  Hemoglobin : 9.2 g/dL  Hematocrit : 27.4 %  Platelet Count - Automated : 306 K/uL  Mean Cell Volume : 79.9 fl  Mean Cell Hemoglobin : 26.8 pg  Mean Cell Hemoglobin Concentration : 33.6 gm/dL  Auto Neutrophil # : x  Auto Lymphocyte # : x  Auto Monocyte # : x  Auto Eosinophil # : x  Auto Basophil # : x  Auto Neutrophil % : x  Auto Lymphocyte % : x  Auto Monocyte % : x  Auto Eosinophil % : x  Auto Basophil % : x    09-17    131<L>  |  87<L>  |  97<H>  ----------------------------<  144<H>  5.9<H>   |  16<L>  |  9.70<H>    Ca    9.7      17 Sep 2017 08:30  Phos  10.9     09-17  Mg     2.7     09-17    TPro  7.4  /  Alb  3.4  /  TBili  0.5  /  DBili  x   /  AST  29  /  ALT  23  /  AlkPhos  106  09-17    PT/INR - ( 16 Sep 2017 07:20 )   PT: 14.9 sec;   INR: 1.37 ratio         PTT - ( 16 Sep 2017 07:20 )  PTT:35.7 sec        ASSESSMENT and PLAN:       LIN due to an obstructed solitary functioning R kidney. Awaiting Urological decompression  * Remains HD dependent. Dialysis #3 yest. BUN/Cr without change over 24h likely due to poor cath flow. HD today again to further tx hyperK, overload.  UF goal 1.5-2kg. K free for last 30 min for moderate hyperK. If cath flow remains poor, will need new temporary access  * Fem HD catheter over 1 week old. Will maintain for now. However, if pt becomes febrile, dc temp access and give a dose of Vanco

## 2017-09-17 NOTE — PROGRESS NOTE ADULT - ASSESSMENT
72M PMH extensive CAD with large # of stents, CKD, COPD, T2DM, ?bipolar, prior CVA transferred from OSH for NSTEMI. Initially had been admitted for pyelonephritis complicated by ARF (due to obstructing stones and infection), required intubation for hypoxic respiratory failure due to pulmonary edema, now requiring HD for his renal failure. Patient now s/p 5 stent placement with acute/chronic HFrEF. Very high risk candidate for urologic intervention to decompress kidney. Likely to require lifelong HD.

## 2017-09-17 NOTE — PROGRESS NOTE ADULT - PROBLEM SELECTOR PLAN 2
--volume status remains an issue (new HD and CHF), today volume status improved following HD yesterday.   --Echo showing EF 35%. Mild-moderate MR, moderate AS, mild LV enlargement, eccentric LV hypertrophy (dilated LV w/ nl relative wall thickness). Mod-severe segmental LV sys dysfunction. The mid to distal septum, mid to distal inferior, apical cap akinetic. Nl RV size & sys fxn  - c/w metoprolol succ 150 - will titrate w/ BPs  - c/w ASA, plavix, lipitor  - c/w ranexa, imdur  - c/w procardia xl  --plan to start ACE-I once patient is deemed ESRD and HD dependant

## 2017-09-17 NOTE — PROGRESS NOTE ADULT - SUBJECTIVE AND OBJECTIVE BOX
Patient is a 72y old  Male who presents with a chief complaint of s/p cardiac cath (12 Sep 2017 19:27)    SUBJECTIVE / OVERNIGHT EVENTS:    [place]  [id number] used.     Yesterday patient had RRT called for chest pain and worsening respiratory status. He required increased oxygenation, up to NRB. Exhibited signs of volume overload. Symptoms improved yesterday after HD.     He currently denies chest pain, SOB, on 4L NC satting 99%. He denies all complaints, however admits to some groin tenderness at prior Impella site. Large ecchymosis but no large palpable hematoma. Some blood tinged urine in the james.     MEDICATIONS  (STANDING):  insulin lispro (HumaLOG) corrective regimen sliding scale   SubCutaneous three times a day before meals  insulin lispro (HumaLOG) corrective regimen sliding scale   SubCutaneous at bedtime  dextrose 5%. 1000 milliLiter(s) (50 mL/Hr) IV Continuous <Continuous>  dextrose 50% Injectable 12.5 Gram(s) IV Push once  dextrose 50% Injectable 25 Gram(s) IV Push once  dextrose 50% Injectable 25 Gram(s) IV Push once  aspirin enteric coated 81 milliGRAM(s) Oral daily  tamsulosin 0.4 milliGRAM(s) Oral at bedtime  isosorbide   mononitrate ER Tablet (IMDUR) 30 milliGRAM(s) Oral daily  ranolazine 1000 milliGRAM(s) Oral two times a day  clopidogrel Tablet 75 milliGRAM(s) Oral daily  cefTRIAXone   IVPB 1 Gram(s) IV Intermittent every 24 hours  NIFEdipine XL 60 milliGRAM(s) Oral daily  buDESOnide  80 MICROgram(s)/formoterol 4.5 MICROgram(s) Inhaler 2 Puff(s) Inhalation two times a day  sevelamer hydrochloride 800 milliGRAM(s) Oral three times a day with meals  busPIRone 5 milliGRAM(s) Oral two times a day  cefTRIAXone   IVPB      atorvastatin 80 milliGRAM(s) Oral at bedtime  metoprolol succinate  milliGRAM(s) Oral daily  heparin  Injectable 5000 Unit(s) SubCutaneous every 8 hours    MEDICATIONS  (PRN):  dextrose Gel 1 Dose(s) Oral once PRN Blood Glucose LESS THAN 70 milliGRAM(s)/deciliter  glucagon  Injectable 1 milliGRAM(s) IntraMuscular once PRN Glucose LESS THAN 70 milligrams/deciliter  ALBUTerol    90 MICROgram(s) HFA Inhaler 2 Puff(s) Inhalation every 6 hours PRN Shortness of Breath  acetaminophen   Tablet. 650 milliGRAM(s) Oral every 6 hours PRN pain  oxyCODONE    IR 5 milliGRAM(s) Oral every 4 hours PRN Severe Pain (7 - 10)    Vital Signs Last 24 Hrs  T(C): 36.8 (09-17-17 @ 08:48)  T(F): 98.2 (09-17-17 @ 08:48), Max: 98.9 (09-16-17 @ 20:24)  HR: 73 (09-17-17 @ 08:48) (73 - 78)  BP: 115/73 (09-17-17 @ 08:48)  BP(mean): --  RR: 19 (09-17-17 @ 08:48) (18 - 24)  SpO2: 99% (09-17-17 @ 08:48) (92% - 100%)  Wt(kg): --    09-16 @ 07:01  -  09-17 @ 07:00  --------------------------------------------------------  IN: 780 mL / OUT: 3525 mL / NET: -2745 mL    09-17 @ 07:01  -  09-17 @ 09:53  --------------------------------------------------------  IN: 220 mL / OUT: 0 mL / NET: 220 mL    CAPILLARY BLOOD GLUCOSE  151 (17 Sep 2017 07:56)  152 (16 Sep 2017 21:12)  175 (16 Sep 2017 16:05)  160 (16 Sep 2017 11:32)    PHYSICAL EXAM:  GENERAL: NAD, well-developed  HEAD:  Atraumatic, Normocephalic  EYES: EOMI, PERRLA, conjunctiva and sclera clear  NECK: Supple, No JVD  CHEST/LUNG: Clear to auscultation bilaterally; No wheeze  HEART: Regular rate and rhythm; No murmurs, rubs, or gallops  ABDOMEN: Soft, mildly distended, Nontender  EXTREMITIES:  1+ Peripheral Pulses, 1+ pitting edema in extremities  PSYCH: AAOx3  NEUROLOGY: non-focal  SKIN: Large ecchymoses L groin with mild tenderness. R groin shiley clean and dry, no signs of infection.   : + james    LABS:             9.2    12.62 )-----------( 306      ( 17 Sep 2017 08:27 )             27.4     129<L>  |  87<L>  |  103<H>  ----------------------------<  148<H>  6.2<HH>   |  15<L>  |  9.55<H>    Ca    9.7      16 Sep 2017 08:13  Phos  10.7     09-16  Mg     2.6     09-16    TPro  7.3  /  Alb  3.1<L>  /  TBili  0.5  /  DBili  x   /  AST  31  /  ALT  21  /  AlkPhos  99  09-16    PT/INR - ( 16 Sep 2017 07:20 )   PT: 14.9 sec;   INR: 1.37 ratio      PTT - ( 16 Sep 2017 07:20 )  PTT:35.7 sec  CARDIAC MARKERS ( 16 Sep 2017 11:56 )  x     / 2.52 ng/mL / 91 U/L / x     / 6.0 ng/mL  CARDIAC MARKERS ( 16 Sep 2017 08:13 )  x     / x     / 85 U/L / x     / x      CARDIAC MARKERS ( 16 Sep 2017 04:29 )  x     / 2.85 ng/mL / x     / x     / 5.3 ng/mL  CARDIAC MARKERS ( 15 Sep 2017 23:11 )  x     / 2.85 ng/mL / 85 U/L / x     / 4.7 ng/mL    RADIOLOGY & ADDITIONAL TESTS:    Imaging Personally Reviewed:    Consultant(s) Notes Reviewed:  Cards, urology, RRT, renal    Care Discussed with Consultants/Other Providers:    Contact: Stefanie Yanez [phone]

## 2017-09-17 NOTE — PROGRESS NOTE ADULT - PROBLEM SELECTOR PLAN 4
--patient with hematuria as well as large ecchymoses in L groin, no signs of hematoma.   --Hgb stable today, but if downtrending, would consider imaging of groin to assess for underlying non-palpable hematoma.  --not requiring transfusion

## 2017-09-18 LAB
ALBUMIN SERPL ELPH-MCNC: 3.2 G/DL — LOW (ref 3.3–5)
ALP SERPL-CCNC: 102 U/L — SIGNIFICANT CHANGE UP (ref 40–120)
ALT FLD-CCNC: 21 U/L — SIGNIFICANT CHANGE UP (ref 10–45)
ANION GAP SERPL CALC-SCNC: 27 MMOL/L — HIGH (ref 5–17)
AST SERPL-CCNC: 28 U/L — SIGNIFICANT CHANGE UP (ref 10–40)
BILIRUB SERPL-MCNC: 0.5 MG/DL — SIGNIFICANT CHANGE UP (ref 0.2–1.2)
BUN SERPL-MCNC: 84 MG/DL — HIGH (ref 7–23)
CALCIUM SERPL-MCNC: 9.5 MG/DL — SIGNIFICANT CHANGE UP (ref 8.4–10.5)
CHLORIDE SERPL-SCNC: 88 MMOL/L — LOW (ref 96–108)
CO2 SERPL-SCNC: 17 MMOL/L — LOW (ref 22–31)
CREAT SERPL-MCNC: 8.75 MG/DL — HIGH (ref 0.5–1.3)
GLUCOSE SERPL-MCNC: 115 MG/DL — HIGH (ref 70–99)
HCT VFR BLD CALC: 26.1 % — LOW (ref 39–50)
HGB BLD-MCNC: 8.8 G/DL — LOW (ref 13–17)
MAGNESIUM SERPL-MCNC: 2.6 MG/DL — SIGNIFICANT CHANGE UP (ref 1.6–2.6)
MCHC RBC-ENTMCNC: 27.1 PG — SIGNIFICANT CHANGE UP (ref 27–34)
MCHC RBC-ENTMCNC: 33.7 GM/DL — SIGNIFICANT CHANGE UP (ref 32–36)
MCV RBC AUTO: 80.3 FL — SIGNIFICANT CHANGE UP (ref 80–100)
PHOSPHATE SERPL-MCNC: 10 MG/DL — HIGH (ref 2.5–4.5)
PLATELET # BLD AUTO: 314 K/UL — SIGNIFICANT CHANGE UP (ref 150–400)
POTASSIUM SERPL-MCNC: 6.1 MMOL/L — HIGH (ref 3.5–5.3)
POTASSIUM SERPL-SCNC: 6.1 MMOL/L — HIGH (ref 3.5–5.3)
PROT SERPL-MCNC: 7.6 G/DL — SIGNIFICANT CHANGE UP (ref 6–8.3)
RBC # BLD: 3.25 M/UL — LOW (ref 4.2–5.8)
RBC # FLD: 13.5 % — SIGNIFICANT CHANGE UP (ref 10.3–14.5)
SODIUM SERPL-SCNC: 132 MMOL/L — LOW (ref 135–145)
WBC # BLD: 13.69 K/UL — HIGH (ref 3.8–10.5)
WBC # FLD AUTO: 13.69 K/UL — HIGH (ref 3.8–10.5)

## 2017-09-18 PROCEDURE — 99233 SBSQ HOSP IP/OBS HIGH 50: CPT | Mod: GC

## 2017-09-18 PROCEDURE — 99232 SBSQ HOSP IP/OBS MODERATE 35: CPT

## 2017-09-18 PROCEDURE — 93010 ELECTROCARDIOGRAM REPORT: CPT

## 2017-09-18 RX ORDER — ALBUTEROL 90 UG/1
2.5 AEROSOL, METERED ORAL ONCE
Qty: 0 | Refills: 0 | Status: COMPLETED | OUTPATIENT
Start: 2017-09-18 | End: 2017-09-18

## 2017-09-18 RX ORDER — INSULIN HUMAN 100 [IU]/ML
10 INJECTION, SOLUTION SUBCUTANEOUS ONCE
Qty: 0 | Refills: 0 | Status: DISCONTINUED | OUTPATIENT
Start: 2017-09-18 | End: 2017-09-18

## 2017-09-18 RX ORDER — HYDRALAZINE HCL 50 MG
10 TABLET ORAL THREE TIMES A DAY
Qty: 0 | Refills: 0 | Status: DISCONTINUED | OUTPATIENT
Start: 2017-09-19 | End: 2017-09-22

## 2017-09-18 RX ORDER — DEXTROSE 50 % IN WATER 50 %
50 SYRINGE (ML) INTRAVENOUS ONCE
Qty: 0 | Refills: 0 | Status: DISCONTINUED | OUTPATIENT
Start: 2017-09-18 | End: 2017-09-18

## 2017-09-18 RX ADMIN — Medication 81 MILLIGRAM(S): at 17:09

## 2017-09-18 RX ADMIN — Medication 2: at 11:57

## 2017-09-18 RX ADMIN — BUDESONIDE AND FORMOTEROL FUMARATE DIHYDRATE 2 PUFF(S): 160; 4.5 AEROSOL RESPIRATORY (INHALATION) at 17:10

## 2017-09-18 RX ADMIN — Medication 5 MILLIGRAM(S): at 17:10

## 2017-09-18 RX ADMIN — RANOLAZINE 1000 MILLIGRAM(S): 500 TABLET, FILM COATED, EXTENDED RELEASE ORAL at 17:09

## 2017-09-18 RX ADMIN — CEFTRIAXONE 100 GRAM(S): 500 INJECTION, POWDER, FOR SOLUTION INTRAMUSCULAR; INTRAVENOUS at 21:42

## 2017-09-18 RX ADMIN — BUDESONIDE AND FORMOTEROL FUMARATE DIHYDRATE 2 PUFF(S): 160; 4.5 AEROSOL RESPIRATORY (INHALATION) at 05:11

## 2017-09-18 RX ADMIN — SEVELAMER CARBONATE 800 MILLIGRAM(S): 2400 POWDER, FOR SUSPENSION ORAL at 17:10

## 2017-09-18 RX ADMIN — RANOLAZINE 1000 MILLIGRAM(S): 500 TABLET, FILM COATED, EXTENDED RELEASE ORAL at 05:06

## 2017-09-18 RX ADMIN — SEVELAMER CARBONATE 800 MILLIGRAM(S): 2400 POWDER, FOR SUSPENSION ORAL at 07:50

## 2017-09-18 RX ADMIN — ATORVASTATIN CALCIUM 80 MILLIGRAM(S): 80 TABLET, FILM COATED ORAL at 21:42

## 2017-09-18 RX ADMIN — TAMSULOSIN HYDROCHLORIDE 0.4 MILLIGRAM(S): 0.4 CAPSULE ORAL at 21:43

## 2017-09-18 RX ADMIN — ISOSORBIDE MONONITRATE 30 MILLIGRAM(S): 60 TABLET, EXTENDED RELEASE ORAL at 17:16

## 2017-09-18 RX ADMIN — Medication 5 MILLIGRAM(S): at 05:06

## 2017-09-18 RX ADMIN — CLOPIDOGREL BISULFATE 75 MILLIGRAM(S): 75 TABLET, FILM COATED ORAL at 17:09

## 2017-09-18 RX ADMIN — OXYCODONE HYDROCHLORIDE 5 MILLIGRAM(S): 5 TABLET ORAL at 11:00

## 2017-09-18 RX ADMIN — Medication 150 MILLIGRAM(S): at 05:05

## 2017-09-18 RX ADMIN — HEPARIN SODIUM 5000 UNIT(S): 5000 INJECTION INTRAVENOUS; SUBCUTANEOUS at 21:43

## 2017-09-18 RX ADMIN — HEPARIN SODIUM 5000 UNIT(S): 5000 INJECTION INTRAVENOUS; SUBCUTANEOUS at 05:08

## 2017-09-18 RX ADMIN — ALBUTEROL 2.5 MILLIGRAM(S): 90 AEROSOL, METERED ORAL at 11:47

## 2017-09-18 RX ADMIN — OXYCODONE HYDROCHLORIDE 5 MILLIGRAM(S): 5 TABLET ORAL at 10:16

## 2017-09-18 RX ADMIN — Medication 60 MILLIGRAM(S): at 05:06

## 2017-09-18 NOTE — PROGRESS NOTE ADULT - ATTENDING COMMENTS
Patient interviewed, examined and chart reviewed.  Case discussed with fellow.  Agree w/ Assessment and Plan as outlined.    P: [phone]  Spectra:  [phone]  Office: [phone]

## 2017-09-18 NOTE — PROGRESS NOTE ADULT - SUBJECTIVE AND OBJECTIVE BOX
Patient is a 72y old  Male who presents with a chief complaint of s/p cardiac cath (12 Sep 2017 19:27)      SUBJECTIVE / OVERNIGHT EVENTS: No acute events overnight. Patient denies F/C, HA, CP, SOB, abdominal pain, N/V/D/C.     MEDICATIONS  (STANDING):  insulin lispro (HumaLOG) corrective regimen sliding scale   SubCutaneous three times a day before meals  insulin lispro (HumaLOG) corrective regimen sliding scale   SubCutaneous at bedtime  dextrose 5%. 1000 milliLiter(s) (50 mL/Hr) IV Continuous <Continuous>  dextrose 50% Injectable 12.5 Gram(s) IV Push once  dextrose 50% Injectable 25 Gram(s) IV Push once  dextrose 50% Injectable 25 Gram(s) IV Push once  aspirin enteric coated 81 milliGRAM(s) Oral daily  tamsulosin 0.4 milliGRAM(s) Oral at bedtime  isosorbide   mononitrate ER Tablet (IMDUR) 30 milliGRAM(s) Oral daily  ranolazine 1000 milliGRAM(s) Oral two times a day  clopidogrel Tablet 75 milliGRAM(s) Oral daily  cefTRIAXone   IVPB 1 Gram(s) IV Intermittent every 24 hours  NIFEdipine XL 60 milliGRAM(s) Oral daily  buDESOnide  80 MICROgram(s)/formoterol 4.5 MICROgram(s) Inhaler 2 Puff(s) Inhalation two times a day  sevelamer hydrochloride 800 milliGRAM(s) Oral three times a day with meals  busPIRone 5 milliGRAM(s) Oral two times a day  cefTRIAXone   IVPB      atorvastatin 80 milliGRAM(s) Oral at bedtime  metoprolol succinate  milliGRAM(s) Oral daily  heparin  Injectable 5000 Unit(s) SubCutaneous every 8 hours    MEDICATIONS  (PRN):  dextrose Gel 1 Dose(s) Oral once PRN Blood Glucose LESS THAN 70 milliGRAM(s)/deciliter  glucagon  Injectable 1 milliGRAM(s) IntraMuscular once PRN Glucose LESS THAN 70 milligrams/deciliter  ALBUTerol    90 MICROgram(s) HFA Inhaler 2 Puff(s) Inhalation every 6 hours PRN Shortness of Breath  acetaminophen   Tablet. 650 milliGRAM(s) Oral every 6 hours PRN pain  oxyCODONE    IR 5 milliGRAM(s) Oral every 4 hours PRN Severe Pain (7 - 10)      T(F): 98 (09-18 @ 04:15), Max: 98.4 (09-17 @ 17:20)  HR: 78 (09-18 @ 05:02) (72 - 78)  BP: 118/69 (09-18 @ 05:02) (105/65 - 133/70)  RR: 19 (09-18 @ 04:15) (18 - 22)  SpO2: 94% (09-18 @ 04:15) (92% - 100%)    CAPILLARY BLOOD GLUCOSE  141 (18 Sep 2017 07:07)  207 (17 Sep 2017 20:58)  160 (17 Sep 2017 16:17)  213 (17 Sep 2017 11:09)  151 (17 Sep 2017 07:56)          I&O's Summary    17 Sep 2017 07:01  -  18 Sep 2017 07:00  --------------------------------------------------------  IN: 560 mL / OUT: 1280 mL / NET: -720 mL        PHYSICAL EXAM:  GEN: Appears in no acute distress  HEENT: PERRLA, EOMI and accommodate, neck supple, no lymphadenopathy, no JVD  MOUTH: moist mucous membranes, no exudates or lesions   PULM: Clear to auscultation bilaterally, no wheezes, rales, rhonchi  CV: Regular rate and rhythm, S1S2, no murmurs, rubs or gallops  ABD: Soft, nontender to palpation, non-distended, normoactive bowel sounds  EXTREMITIES: No edema, + peripheral pulses  NEURO: AAOx3, moving all four extremities spontaneously  SKIN: No rashes, lesions, hematomas, ecchymosis    LABS & IMAGING:  Labs personally reviewed [X]                        9.2    12.62 )-----------( 306      ( 17 Sep 2017 08:27 )             27.4     Hgb Trend: 9.2<--, 9.4<--, 10.4<--, 11.4<--, 11.3<--    09-17    135  |  90<L>  |  48<H>  ----------------------------<  97  3.5   |  22  |  5.05<H>    Ca    9.3      17 Sep 2017 21:48  Phos  10.9     09-17  Mg     2.7     09-17    TPro  7.4  /  Alb  3.4  /  TBili  0.5  /  DBili  x   /  AST  29  /  ALT  23  /  AlkPhos  106  09-17    Creatinine Trend: 5.05<--, 7.22<--, 9.70<--, 9.55<--, 7.96<--, 8.84<--          Imaging personally reviewed [ ] Patient is a 72y old  Male who presents with a chief complaint of s/p cardiac cath (12 Sep 2017 19:27)      SUBJECTIVE / OVERNIGHT EVENTS: No acute events overnight. Patient complains of R flank pain that has been ongoing. Denies CP or SOB. Tolerated last HD well yesterday. Patient denies F/C, HA, N/V/D/C.     MEDICATIONS  (STANDING):  insulin lispro (HumaLOG) corrective regimen sliding scale   SubCutaneous three times a day before meals  insulin lispro (HumaLOG) corrective regimen sliding scale   SubCutaneous at bedtime  dextrose 5%. 1000 milliLiter(s) (50 mL/Hr) IV Continuous <Continuous>  dextrose 50% Injectable 12.5 Gram(s) IV Push once  dextrose 50% Injectable 25 Gram(s) IV Push once  dextrose 50% Injectable 25 Gram(s) IV Push once  aspirin enteric coated 81 milliGRAM(s) Oral daily  tamsulosin 0.4 milliGRAM(s) Oral at bedtime  isosorbide   mononitrate ER Tablet (IMDUR) 30 milliGRAM(s) Oral daily  ranolazine 1000 milliGRAM(s) Oral two times a day  clopidogrel Tablet 75 milliGRAM(s) Oral daily  cefTRIAXone   IVPB 1 Gram(s) IV Intermittent every 24 hours  NIFEdipine XL 60 milliGRAM(s) Oral daily  buDESOnide  80 MICROgram(s)/formoterol 4.5 MICROgram(s) Inhaler 2 Puff(s) Inhalation two times a day  sevelamer hydrochloride 800 milliGRAM(s) Oral three times a day with meals  busPIRone 5 milliGRAM(s) Oral two times a day  cefTRIAXone   IVPB      atorvastatin 80 milliGRAM(s) Oral at bedtime  metoprolol succinate  milliGRAM(s) Oral daily  heparin  Injectable 5000 Unit(s) SubCutaneous every 8 hours    MEDICATIONS  (PRN):  dextrose Gel 1 Dose(s) Oral once PRN Blood Glucose LESS THAN 70 milliGRAM(s)/deciliter  glucagon  Injectable 1 milliGRAM(s) IntraMuscular once PRN Glucose LESS THAN 70 milligrams/deciliter  ALBUTerol    90 MICROgram(s) HFA Inhaler 2 Puff(s) Inhalation every 6 hours PRN Shortness of Breath  acetaminophen   Tablet. 650 milliGRAM(s) Oral every 6 hours PRN pain  oxyCODONE    IR 5 milliGRAM(s) Oral every 4 hours PRN Severe Pain (7 - 10)      T(F): 98 (09-18 @ 04:15), Max: 98.4 (09-17 @ 17:20)  HR: 78 (09-18 @ 05:02) (72 - 78)  BP: 118/69 (09-18 @ 05:02) (105/65 - 133/70)  RR: 19 (09-18 @ 04:15) (18 - 22)  SpO2: 94% (09-18 @ 04:15) (92% - 100%)    CAPILLARY BLOOD GLUCOSE  141 (18 Sep 2017 07:07)  207 (17 Sep 2017 20:58)  160 (17 Sep 2017 16:17)  213 (17 Sep 2017 11:09)  151 (17 Sep 2017 07:56)      I&O's Summary    17 Sep 2017 07:01  -  18 Sep 2017 07:00  --------------------------------------------------------  IN: 560 mL / OUT: 1280 mL / NET: -720 mL      PHYSICAL EXAM:  GEN: Appears in no acute distress  HEENT: PERRLA, EOMI and accommodate, neck supple, no lymphadenopathy, no JVD  MOUTH: moist mucous membranes, no exudates or lesions   PULM: bilateral crackles. Pt on NC at time of exam  CV: distant heart sounds, Regular rate and rhythm, S1S2, no murmurs, rubs or gallops  ABD: Soft, nontender to palpation, non-distended, normoactive bowel sounds  EXTREMITIES: No edema, +1 peripheral pulses  NEURO: AAOx3, moving all four extremities spontaneously  TUBES: L peripheral IV, james draining dark urine (casts? blood?) in it low volume, shiley R femoral    LABS & IMAGING: Patient is a 72y old  Male who presents with a chief complaint of s/p cardiac cath (12 Sep 2017 19:27)      SUBJECTIVE / OVERNIGHT EVENTS: No acute events overnight. Patient complains of R flank pain that has been ongoing. Denies CP or SOB. Tolerated last HD well yesterday. Patient denies F/C, HA, N/V/D/C.     MEDICATIONS  (STANDING):  insulin lispro (HumaLOG) corrective regimen sliding scale   SubCutaneous three times a day before meals  insulin lispro (HumaLOG) corrective regimen sliding scale   SubCutaneous at bedtime  dextrose 5%. 1000 milliLiter(s) (50 mL/Hr) IV Continuous <Continuous>  dextrose 50% Injectable 12.5 Gram(s) IV Push once  dextrose 50% Injectable 25 Gram(s) IV Push once  dextrose 50% Injectable 25 Gram(s) IV Push once  aspirin enteric coated 81 milliGRAM(s) Oral daily  tamsulosin 0.4 milliGRAM(s) Oral at bedtime  isosorbide   mononitrate ER Tablet (IMDUR) 30 milliGRAM(s) Oral daily  ranolazine 1000 milliGRAM(s) Oral two times a day  clopidogrel Tablet 75 milliGRAM(s) Oral daily  cefTRIAXone   IVPB 1 Gram(s) IV Intermittent every 24 hours  NIFEdipine XL 60 milliGRAM(s) Oral daily  buDESOnide  80 MICROgram(s)/formoterol 4.5 MICROgram(s) Inhaler 2 Puff(s) Inhalation two times a day  sevelamer hydrochloride 800 milliGRAM(s) Oral three times a day with meals  busPIRone 5 milliGRAM(s) Oral two times a day  cefTRIAXone   IVPB      atorvastatin 80 milliGRAM(s) Oral at bedtime  metoprolol succinate  milliGRAM(s) Oral daily  heparin  Injectable 5000 Unit(s) SubCutaneous every 8 hours    MEDICATIONS  (PRN):  dextrose Gel 1 Dose(s) Oral once PRN Blood Glucose LESS THAN 70 milliGRAM(s)/deciliter  glucagon  Injectable 1 milliGRAM(s) IntraMuscular once PRN Glucose LESS THAN 70 milligrams/deciliter  ALBUTerol    90 MICROgram(s) HFA Inhaler 2 Puff(s) Inhalation every 6 hours PRN Shortness of Breath  acetaminophen   Tablet. 650 milliGRAM(s) Oral every 6 hours PRN pain  oxyCODONE    IR 5 milliGRAM(s) Oral every 4 hours PRN Severe Pain (7 - 10)      T(F): 98 (09-18 @ 04:15), Max: 98.4 (09-17 @ 17:20)  HR: 78 (09-18 @ 05:02) (72 - 78)  BP: 118/69 (09-18 @ 05:02) (105/65 - 133/70)  RR: 19 (09-18 @ 04:15) (18 - 22)  SpO2: 94% (09-18 @ 04:15) (92% - 100%)    CAPILLARY BLOOD GLUCOSE  141 (18 Sep 2017 07:07)  207 (17 Sep 2017 20:58)  160 (17 Sep 2017 16:17)  213 (17 Sep 2017 11:09)  151 (17 Sep 2017 07:56)      I&O's Summary    17 Sep 2017 07:01  -  18 Sep 2017 07:00  --------------------------------------------------------  IN: 560 mL / OUT: 1280 mL / NET: -720 mL      PHYSICAL EXAM:  GEN: Appears in no acute distress  HEENT: PERRLA, EOMI and accommodate, neck supple, no lymphadenopathy, no JVD  MOUTH: moist mucous membranes, no exudates or lesions   PULM: bilateral crackles. Pt on NC at time of exam  CV: distant heart sounds, Regular rate and rhythm, S1S2, no murmurs, rubs or gallops  ABD: Soft, nontender to palpation, non-distended, normoactive bowel sounds  EXTREMITIES: No edema, +1 peripheral pulses  NEURO: AAOx3, moving all four extremities spontaneously  TUBES: L peripheral IV, james draining dark urine (casts? blood?) in it low volume, shiley R femoral    LABS & IMAGING:  Labs reviewed personally.                        8.8    13.69 )-----------( 314      ( 18 Sep 2017 07:16 )             26.1     Hgb Trend: 8.8<--, 9.2<--, 9.4<--, 10.4<--, 11.4<--        Imaging reviewed personally. Patient is a 72y old  Male who presents with a chief complaint of s/p cardiac cath (12 Sep 2017 19:27)      SUBJECTIVE / OVERNIGHT EVENTS: No acute events overnight. Patient complains of R flank pain that has been ongoing. Denies CP or SOB. Tolerated last HD well yesterday. Patient denies F/C, HA, N/V/D/C. Plan for HD again today.    MEDICATIONS  (STANDING):  insulin lispro (HumaLOG) corrective regimen sliding scale   SubCutaneous three times a day before meals  insulin lispro (HumaLOG) corrective regimen sliding scale   SubCutaneous at bedtime  dextrose 5%. 1000 milliLiter(s) (50 mL/Hr) IV Continuous <Continuous>  dextrose 50% Injectable 12.5 Gram(s) IV Push once  dextrose 50% Injectable 25 Gram(s) IV Push once  dextrose 50% Injectable 25 Gram(s) IV Push once  aspirin enteric coated 81 milliGRAM(s) Oral daily  tamsulosin 0.4 milliGRAM(s) Oral at bedtime  isosorbide   mononitrate ER Tablet (IMDUR) 30 milliGRAM(s) Oral daily  ranolazine 1000 milliGRAM(s) Oral two times a day  clopidogrel Tablet 75 milliGRAM(s) Oral daily  cefTRIAXone   IVPB 1 Gram(s) IV Intermittent every 24 hours  NIFEdipine XL 60 milliGRAM(s) Oral daily  buDESOnide  80 MICROgram(s)/formoterol 4.5 MICROgram(s) Inhaler 2 Puff(s) Inhalation two times a day  sevelamer hydrochloride 800 milliGRAM(s) Oral three times a day with meals  busPIRone 5 milliGRAM(s) Oral two times a day  cefTRIAXone   IVPB      atorvastatin 80 milliGRAM(s) Oral at bedtime  metoprolol succinate  milliGRAM(s) Oral daily  heparin  Injectable 5000 Unit(s) SubCutaneous every 8 hours    MEDICATIONS  (PRN):  dextrose Gel 1 Dose(s) Oral once PRN Blood Glucose LESS THAN 70 milliGRAM(s)/deciliter  glucagon  Injectable 1 milliGRAM(s) IntraMuscular once PRN Glucose LESS THAN 70 milligrams/deciliter  ALBUTerol    90 MICROgram(s) HFA Inhaler 2 Puff(s) Inhalation every 6 hours PRN Shortness of Breath  acetaminophen   Tablet. 650 milliGRAM(s) Oral every 6 hours PRN pain  oxyCODONE    IR 5 milliGRAM(s) Oral every 4 hours PRN Severe Pain (7 - 10)      T(F): 98 (09-18 @ 04:15), Max: 98.4 (09-17 @ 17:20)  HR: 78 (09-18 @ 05:02) (72 - 78)  BP: 118/69 (09-18 @ 05:02) (105/65 - 133/70)  RR: 19 (09-18 @ 04:15) (18 - 22)  SpO2: 94% (09-18 @ 04:15) (92% - 100%)    CAPILLARY BLOOD GLUCOSE  141 (18 Sep 2017 07:07)  207 (17 Sep 2017 20:58)  160 (17 Sep 2017 16:17)  213 (17 Sep 2017 11:09)  151 (17 Sep 2017 07:56)      I&O's Summary    17 Sep 2017 07:01  -  18 Sep 2017 07:00  --------------------------------------------------------  IN: 560 mL / OUT: 1280 mL / NET: -720 mL      PHYSICAL EXAM:  GEN: Appears in no acute distress  HEENT: PERRLA, EOMI and accommodate, neck supple, no lymphadenopathy, no JVD  MOUTH: moist mucous membranes, no exudates or lesions   PULM: bilateral crackles. Pt on NC at time of exam  CV: distant heart sounds, Regular rate and rhythm, S1S2, no murmurs, rubs or gallops  ABD: Soft, nontender to palpation, non-distended, normoactive bowel sounds  EXTREMITIES: No edema, +1 peripheral pulses  NEURO: AAOx3, moving all four extremities spontaneously  TUBES: L peripheral IV, james draining dark urine (casts? blood?) in it low volume, shiley R femoral    LABS & IMAGING:  Labs reviewed personally.                        8.8    13.69 )-----------( 314      ( 18 Sep 2017 07:16 )             26.1     Hgb Trend: 8.8<--, 9.2<--, 9.4<--, 10.4<--, 11.4<--    09-18    132<L>  |  88<L>  |  84<H>  ----------------------------<  115<H>  6.1<H>   |  17<L>  |  8.75<H>    Ca    9.5      18 Sep 2017 09:36  Phos  10.0     09-18  Mg     2.6     09-18    TPro  7.6  /  Alb  3.2<L>  /  TBili  0.5  /  DBili  x   /  AST  28  /  ALT  21  /  AlkPhos  102  09-18        Imaging reviewed personally. Patient is a 72y old  Male who presents with a chief complaint of s/p cardiac cath (12 Sep 2017 19:27)      SUBJECTIVE / OVERNIGHT EVENTS: No acute events overnight. Patient complains of R flank pain that has been ongoing. Denies CP or SOB. Tolerated last HD well yesterday. Patient denies F/C, HA, N/V/D/C. Plan for HD again today.    MEDICATIONS  (STANDING):  insulin lispro (HumaLOG) corrective regimen sliding scale   SubCutaneous three times a day before meals  insulin lispro (HumaLOG) corrective regimen sliding scale   SubCutaneous at bedtime  dextrose 5%. 1000 milliLiter(s) (50 mL/Hr) IV Continuous <Continuous>  dextrose 50% Injectable 12.5 Gram(s) IV Push once  dextrose 50% Injectable 25 Gram(s) IV Push once  dextrose 50% Injectable 25 Gram(s) IV Push once  aspirin enteric coated 81 milliGRAM(s) Oral daily  tamsulosin 0.4 milliGRAM(s) Oral at bedtime  isosorbide   mononitrate ER Tablet (IMDUR) 30 milliGRAM(s) Oral daily  ranolazine 1000 milliGRAM(s) Oral two times a day  clopidogrel Tablet 75 milliGRAM(s) Oral daily  cefTRIAXone   IVPB 1 Gram(s) IV Intermittent every 24 hours  NIFEdipine XL 60 milliGRAM(s) Oral daily  buDESOnide  80 MICROgram(s)/formoterol 4.5 MICROgram(s) Inhaler 2 Puff(s) Inhalation two times a day  sevelamer hydrochloride 800 milliGRAM(s) Oral three times a day with meals  busPIRone 5 milliGRAM(s) Oral two times a day  cefTRIAXone   IVPB      atorvastatin 80 milliGRAM(s) Oral at bedtime  metoprolol succinate  milliGRAM(s) Oral daily  heparin  Injectable 5000 Unit(s) SubCutaneous every 8 hours    MEDICATIONS  (PRN):  dextrose Gel 1 Dose(s) Oral once PRN Blood Glucose LESS THAN 70 milliGRAM(s)/deciliter  glucagon  Injectable 1 milliGRAM(s) IntraMuscular once PRN Glucose LESS THAN 70 milligrams/deciliter  ALBUTerol    90 MICROgram(s) HFA Inhaler 2 Puff(s) Inhalation every 6 hours PRN Shortness of Breath  acetaminophen   Tablet. 650 milliGRAM(s) Oral every 6 hours PRN pain  oxyCODONE    IR 5 milliGRAM(s) Oral every 4 hours PRN Severe Pain (7 - 10)      T(F): 98 (09-18 @ 04:15), Max: 98.4 (09-17 @ 17:20)  HR: 78 (09-18 @ 05:02) (72 - 78)  BP: 118/69 (09-18 @ 05:02) (105/65 - 133/70)  RR: 19 (09-18 @ 04:15) (18 - 22)  SpO2: 94% (09-18 @ 04:15) (92% - 100%)    CAPILLARY BLOOD GLUCOSE  141 (18 Sep 2017 07:07)  207 (17 Sep 2017 20:58)  160 (17 Sep 2017 16:17)  213 (17 Sep 2017 11:09)  151 (17 Sep 2017 07:56)      I&O's Summary    17 Sep 2017 07:01  -  18 Sep 2017 07:00  --------------------------------------------------------  IN: 560 mL / OUT: 1280 mL / NET: -720 mL      PHYSICAL EXAM:  GEN: Appears in no acute distress  HEENT: PERRLA, EOMI and accommodate, neck supple, no lymphadenopathy, no JVD  MOUTH: moist mucous membranes, no exudates or lesions   PULM: bilateral crackles. Pt on NC at time of exam  CV: distant heart sounds, Regular rate and rhythm, S1S2, no murmurs, rubs or gallops  ABD: Soft, nontender to palpation, non-distended, normoactive bowel sounds  EXTREMITIES: No edema, +1 peripheral pulses, large ecchymosis on L side from impella  NEURO: AAOx3, moving all four extremities spontaneously  TUBES: L peripheral IV, james draining dark urine (casts? blood?) in it low volume, shiley R femoral    LABS & IMAGING:  Labs reviewed personally.                        8.8    13.69 )-----------( 314      ( 18 Sep 2017 07:16 )             26.1     Hgb Trend: 8.8<--, 9.2<--, 9.4<--, 10.4<--, 11.4<--    09-18    132<L>  |  88<L>  |  84<H>  ----------------------------<  115<H>  6.1<H>   |  17<L>  |  8.75<H>    Ca    9.5      18 Sep 2017 09:36  Phos  10.0     09-18  Mg     2.6     09-18    TPro  7.6  /  Alb  3.2<L>  /  TBili  0.5  /  DBili  x   /  AST  28  /  ALT  21  /  AlkPhos  102  09-18        Imaging reviewed personally.

## 2017-09-18 NOTE — PROGRESS NOTE ADULT - ASSESSMENT
71 yo male transferred from [facility] for NSTEMI. Patient now s/p 5 stent placement, with new onset CHF, LIN on CKD, pyelonephritis.

## 2017-09-18 NOTE — PROGRESS NOTE ADULT - ATTENDING COMMENTS
Sean Gallego  Attending Physician   Division of Infectious Disease  Pager #[phone]  After 5pm/weekend #[phone]

## 2017-09-18 NOTE — PROGRESS NOTE ADULT - PROBLEM SELECTOR PLAN 2
--volume status remains an issue (new HD and CHF), today volume status improved following HD yesterday.   --Echo showing EF 35%. Mild-moderate MR, moderate AS, mild LV enlargement, eccentric LV hypertrophy (dilated LV w/ nl relative wall thickness). Mod-severe segmental LV sys dysfunction. The mid to distal septum, mid to distal inferior, apical cap akinetic. Nl RV size & sys fxn  - c/w metoprolol succ 150 - will titrate w/ BPs  - c/w ASA, plavix, lipitor  - c/w ranexa, imdur  - c/w procardia xl  --plan to start ACE-I once patient is deemed ESRD and HD dependant - volume status remains an issue (new HD and CHF), today volume status improved following HD yesterday.   - Echo showing EF 35%. Mild-moderate MR, moderate AS, mild LV enlargement, eccentric LV hypertrophy (dilated LV w/ nl relative wall thickness). Mod-severe segmental LV sys dysfunction. The mid to distal septum, mid to distal inferior, apical cap akinetic. Nl RV size & sys fxn  - c/w metoprolol succ 150 - will titrate w/ BPs  - c/w ASA, plavix, lipitor  - c/w ranexa, imdur  - c/w procardia xl  - plan to start ACE-I once patient is deemed ESRD and HD dependant Volume status remains an issue (new HD and CHF), today volume status improved following HD yesterday.   - Echo showing EF 35%. Mild-moderate MR, moderate AS, mild LV enlargement, eccentric LV hypertrophy (dilated LV w/ nl relative wall thickness). Mod-severe segmental LV sys dysfunction. The mid to distal septum, mid to distal inferior, apical cap akinetic. Nl RV size & sys fxn  - c/w metoprolol succ 150 - will titrate w/ BPs  - c/w ASA, plavix, lipitor  - c/w ranexa, imdur  - c/w procardia xl  - plan to start ACE-I once patient is deemed ESRD and HD dependant

## 2017-09-18 NOTE — PROGRESS NOTE ADULT - PROBLEM SELECTOR PLAN 3
- Acute renal failure on CKD in setting of obstructed R kidney (L kidney atrophic).   - c/w HD, received yesterday  - R fem shihal > one week old  - readdress more permanent access today with IR now that patient more stable following cardiac interventions  - pt very high bleeding risk for urological intervention vs perc nephrostomy tube. Unlikely to be general anesthesia candidate.   - mild hematuria, started with DAPTs, continue to monitor, urology following - Acute renal failure on CKD in setting of obstructed R kidney (L kidney atrophic).   - c/w HD, next today  - R fem shiley > one week old  - readdress more permanent access today with IR now that patient more stable following cardiac interventions  - pt very high bleeding risk for urological intervention vs perc nephrostomy tube. Unlikely to be general anesthesia candidate.   - mild hematuria, started with DAPTs, continue to monitor, urology following  - K 6.1 today - given albuterol/insulin/glucose - will get HD this afternoon - Acute renal failure on CKD in setting of obstructed R kidney (L kidney atrophic).   - c/w HD, next today  - R fem shiley > one week old  - readdress more permanent access today with IR now that patient more stable following cardiac interventions  - Urology will proceed with ureteral stent, awaiting urine clx and cardiac clearance  - mild hematuria, started with DAPTs, continue to monitor, urology following  - K 6.1 today - given albuterol/insulin/glucose - will get HD this afternoon

## 2017-09-18 NOTE — PROGRESS NOTE ADULT - SUBJECTIVE AND OBJECTIVE BOX
· Subjective and Objective: 	  Seen on HD, no CP, SOB    Vital Signs Last 24 Hrs  T(C): 36.3 (09-18-17 @ 12:40), Max: 36.9 (09-17-17 @ 17:20)  T(F): 97.4 (09-18-17 @ 12:40), Max: 98.4 (09-17-17 @ 17:20)  HR: 80 (09-18-17 @ 12:40) (75 - 80)  BP: 103/58 (09-18-17 @ 12:40) (103/58 - 133/70)  BP(mean): --  RR: 18 (09-18-17 @ 12:40) (18 - 22)  SpO2: 91% (09-18-17 @ 12:40) (91% - 100%)    Physical Exam:  · Constitutional	Well-developed, well nourished	  · Neck	supple	  		  · Respiratory Details	decr BS at bases b/l	  · Cardiovascular	Regular rate & rhythm, normal S1, S2; no murmurs, gallops or rubs	  · Gastrointestinal	Soft, non-tender, ND, normal bowel sounds	  · Extremities	no edema	  · Extremities Comments	Right femoral dialysis catheter in place	  		  		  		      insulin lispro (HumaLOG) corrective regimen sliding scale   SubCutaneous three times a day before meals  insulin lispro (HumaLOG) corrective regimen sliding scale   SubCutaneous at bedtime  dextrose 5%. 1000 milliLiter(s) IV Continuous <Continuous>  dextrose Gel 1 Dose(s) Oral once PRN  dextrose 50% Injectable 12.5 Gram(s) IV Push once  dextrose 50% Injectable 25 Gram(s) IV Push once  dextrose 50% Injectable 25 Gram(s) IV Push once  glucagon  Injectable 1 milliGRAM(s) IntraMuscular once PRN  aspirin enteric coated 81 milliGRAM(s) Oral daily  tamsulosin 0.4 milliGRAM(s) Oral at bedtime  isosorbide   mononitrate ER Tablet (IMDUR) 30 milliGRAM(s) Oral daily  ranolazine 1000 milliGRAM(s) Oral two times a day  clopidogrel Tablet 75 milliGRAM(s) Oral daily  cefTRIAXone   IVPB 1 Gram(s) IV Intermittent every 24 hours  NIFEdipine XL 60 milliGRAM(s) Oral daily  buDESOnide  80 MICROgram(s)/formoterol 4.5 MICROgram(s) Inhaler 2 Puff(s) Inhalation two times a day  ALBUTerol    90 MICROgram(s) HFA Inhaler 2 Puff(s) Inhalation every 6 hours PRN  sevelamer hydrochloride 800 milliGRAM(s) Oral three times a day with meals  busPIRone 5 milliGRAM(s) Oral two times a day  cefTRIAXone   IVPB      acetaminophen   Tablet. 650 milliGRAM(s) Oral every 6 hours PRN  atorvastatin 80 milliGRAM(s) Oral at bedtime  oxyCODONE    IR 5 milliGRAM(s) Oral every 4 hours PRN  metoprolol succinate  milliGRAM(s) Oral daily  heparin  Injectable 5000 Unit(s) SubCutaneous every 8 hours                                        8.8    13.69 )-----------( 314      ( 18 Sep 2017 07:16 )             26.1     18 Sep 2017 09:36    132    |  88     |  84     ----------------------------<  115    6.1     |  17     |  8.75     Ca    9.5        18 Sep 2017 09:36  Phos  10.0      18 Sep 2017 09:36  Mg     2.6       18 Sep 2017 09:36    TPro  7.6    /  Alb  3.2    /  TBili  0.5    /  DBili  x      /  AST  28     /  ALT  21     /  AlkPhos  102    18 Sep 2017 09:36    LIVER FUNCTIONS - ( 18 Sep 2017 09:36 )  Alb: 3.2 g/dL / Pro: 7.6 g/dL / ALK PHOS: 102 U/L / ALT: 21 U/L / AST: 28 U/L / GGT: x                Assessment /  Plan:    DM, obstructive LIN on CKD in setting of L kidney atrophy and R hydro due to stone  Started on HD in [facility]  S/p cath, PCI  HD today for hyperkalemia  Pt w/LIN on CKD in setting of L kidney atrophy and obstructed R kidney   following  R PCN as soon as cleared by Cardiology  BMP daily  No nephrotoxins  D/w family at bedside

## 2017-09-18 NOTE — PROGRESS NOTE ADULT - PROBLEM SELECTOR PLAN 9
--c/w HSQ as patient very high risk, if HGB continues to downtrend will discontinue - c/w HSQ as patient very high risk, if HGB continues to downtrend will discontinue

## 2017-09-18 NOTE — PROGRESS NOTE ADULT - SUBJECTIVE AND OBJECTIVE BOX
JIMI BUCHANAN 72y [id number]    Patient is a 72y old  Male who presents with a chief complaint of s/p cardiac cath (12 Sep 2017 19:27)      Follow Up/CC:  pyelonephritis    Interval History/ROS: no fevers    Allergies    No Known Allergies    Intolerances        ANTIMICROBIALS:  cefTRIAXone   IVPB 1 every 24 hours  cefTRIAXone   IVPB        MEDICATIONS  (STANDING):  insulin lispro (HumaLOG) corrective regimen sliding scale   SubCutaneous three times a day before meals  insulin lispro (HumaLOG) corrective regimen sliding scale   SubCutaneous at bedtime  dextrose 5%. 1000 milliLiter(s) (50 mL/Hr) IV Continuous <Continuous>  dextrose 50% Injectable 12.5 Gram(s) IV Push once  dextrose 50% Injectable 25 Gram(s) IV Push once  dextrose 50% Injectable 25 Gram(s) IV Push once  aspirin enteric coated 81 milliGRAM(s) Oral daily  tamsulosin 0.4 milliGRAM(s) Oral at bedtime  isosorbide   mononitrate ER Tablet (IMDUR) 30 milliGRAM(s) Oral daily  ranolazine 1000 milliGRAM(s) Oral two times a day  clopidogrel Tablet 75 milliGRAM(s) Oral daily  cefTRIAXone   IVPB 1 Gram(s) IV Intermittent every 24 hours  NIFEdipine XL 60 milliGRAM(s) Oral daily  buDESOnide  80 MICROgram(s)/formoterol 4.5 MICROgram(s) Inhaler 2 Puff(s) Inhalation two times a day  sevelamer hydrochloride 800 milliGRAM(s) Oral three times a day with meals  busPIRone 5 milliGRAM(s) Oral two times a day  cefTRIAXone   IVPB      atorvastatin 80 milliGRAM(s) Oral at bedtime  metoprolol succinate  milliGRAM(s) Oral daily  heparin  Injectable 5000 Unit(s) SubCutaneous every 8 hours    MEDICATIONS  (PRN):  dextrose Gel 1 Dose(s) Oral once PRN Blood Glucose LESS THAN 70 milliGRAM(s)/deciliter  glucagon  Injectable 1 milliGRAM(s) IntraMuscular once PRN Glucose LESS THAN 70 milligrams/deciliter  ALBUTerol    90 MICROgram(s) HFA Inhaler 2 Puff(s) Inhalation every 6 hours PRN Shortness of Breath  acetaminophen   Tablet. 650 milliGRAM(s) Oral every 6 hours PRN pain  oxyCODONE    IR 5 milliGRAM(s) Oral every 4 hours PRN Severe Pain (7 - 10)        Vital Signs Last 24 Hrs  T(C): 36.3 (18 Sep 2017 12:40), Max: 36.9 (17 Sep 2017 17:20)  T(F): 97.4 (18 Sep 2017 12:40), Max: 98.4 (17 Sep 2017 17:20)  HR: 80 (18 Sep 2017 12:40) (75 - 80)  BP: 103/58 (18 Sep 2017 12:40) (103/58 - 133/70)  BP(mean): --  RR: 18 (18 Sep 2017 12:40) (18 - 22)  SpO2: 91% (18 Sep 2017 12:40) (91% - 100%)    CBC Full  -  ( 18 Sep 2017 07:16 )  WBC Count : 13.69 K/uL  Hemoglobin : 8.8 g/dL  Hematocrit : 26.1 %  Platelet Count - Automated : 314 K/uL  Mean Cell Volume : 80.3 fl  Mean Cell Hemoglobin : 27.1 pg  Mean Cell Hemoglobin Concentration : 33.7 gm/dL  Auto Neutrophil # : x  Auto Lymphocyte # : x  Auto Monocyte # : x  Auto Eosinophil # : x  Auto Basophil # : x  Auto Neutrophil % : x  Auto Lymphocyte % : x  Auto Monocyte % : x  Auto Eosinophil % : x  Auto Basophil % : x    09-18    132<L>  |  88<L>  |  84<H>  ----------------------------<  115<H>  6.1<H>   |  17<L>  |  8.75<H>    Ca    9.5      18 Sep 2017 09:36  Phos  10.0     09-18  Mg     2.6     09-18    TPro  7.6  /  Alb  3.2<L>  /  TBili  0.5  /  DBili  x   /  AST  28  /  ALT  21  /  AlkPhos  102  09-18    LIVER FUNCTIONS - ( 18 Sep 2017 09:36 )  Alb: 3.2 g/dL / Pro: 7.6 g/dL / ALK PHOS: 102 U/L / ALT: 21 U/L / AST: 28 U/L / GGT: x               MICROBIOLOGY:                    RADIOLOGY    CXR:    CT HEAD:    CT CHEST:    CT ABDOMEN:    MRI:    OTHER:

## 2017-09-18 NOTE — PROGRESS NOTE ADULT - PROBLEM SELECTOR PLAN 4
- patient with hematuria as well as large ecchymoses in L groin, no signs of hematoma.   - Hgb stable today, but if downtrending, would consider imaging of groin to assess for underlying non-palpable hematoma.  - not requiring transfusion Patient with hematuria as well as large ecchymoses in L groin, no signs of hematoma.   - steadily downtrending - if continues to downtrend, will consider repeat imaging to reassess hematoma from impella  - not requiring transfusion  - daily CBC

## 2017-09-18 NOTE — PROGRESS NOTE ADULT - SUBJECTIVE AND OBJECTIVE BOX
24H hour events: No acute events.    MEDICATIONS:  aspirin enteric coated 81 milliGRAM(s) Oral daily  tamsulosin 0.4 milliGRAM(s) Oral at bedtime  isosorbide   mononitrate ER Tablet (IMDUR) 30 milliGRAM(s) Oral daily  ranolazine 1000 milliGRAM(s) Oral two times a day  clopidogrel Tablet 75 milliGRAM(s) Oral daily  NIFEdipine XL 60 milliGRAM(s) Oral daily  metoprolol succinate  milliGRAM(s) Oral daily  heparin  Injectable 5000 Unit(s) SubCutaneous every 8 hours    cefTRIAXone   IVPB 1 Gram(s) IV Intermittent every 24 hours  cefTRIAXone   IVPB        buDESOnide  80 MICROgram(s)/formoterol 4.5 MICROgram(s) Inhaler 2 Puff(s) Inhalation two times a day  ALBUTerol    90 MICROgram(s) HFA Inhaler 2 Puff(s) Inhalation every 6 hours PRN    busPIRone 5 milliGRAM(s) Oral two times a day  acetaminophen   Tablet. 650 milliGRAM(s) Oral every 6 hours PRN  oxyCODONE    IR 5 milliGRAM(s) Oral every 4 hours PRN      insulin lispro (HumaLOG) corrective regimen sliding scale   SubCutaneous three times a day before meals  insulin lispro (HumaLOG) corrective regimen sliding scale   SubCutaneous at bedtime  dextrose Gel 1 Dose(s) Oral once PRN  dextrose 50% Injectable 12.5 Gram(s) IV Push once  dextrose 50% Injectable 25 Gram(s) IV Push once  dextrose 50% Injectable 25 Gram(s) IV Push once  glucagon  Injectable 1 milliGRAM(s) IntraMuscular once PRN  atorvastatin 80 milliGRAM(s) Oral at bedtime    dextrose 5%. 1000 milliLiter(s) IV Continuous <Continuous>      REVIEW OF SYSTEMS:  Denies CP, SOB, orthopnea. Complains of R flank pain  Remaining 10point ROS negative.    PHYSICAL EXAM:  T(C): 36.7 (09-18-17 @ 04:15), Max: 36.9 (09-17-17 @ 17:20)  HR: 78 (09-18-17 @ 05:02) (72 - 78)  BP: 118/69 (09-18-17 @ 05:02) (105/65 - 133/70)  RR: 19 (09-18-17 @ 04:15) (18 - 22)  SpO2: 94% (09-18-17 @ 04:15) (92% - 100%)  Wt(kg): --  I&O's Summary    17 Sep 2017 07:01  -  18 Sep 2017 07:00  --------------------------------------------------------  IN: 560 mL / OUT: 1280 mL / NET: -720 mL    18 Sep 2017 07:01  -  18 Sep 2017 10:17  --------------------------------------------------------  IN: 220 mL / OUT: 200 mL / NET: 20 mL    Appearance: Normal	  HEENT:   Normal oral mucosa, PERRL, EOMI	  Lymphatic: No lymphadenopathy  Cardiovascular: Normal S1 S2, No JVD, No murmurs, No edema  left femoral temp HD cath  right fem access small hematoma, nontender, distal pulses doplerable  Respiratory: Lungs clear to auscultation	  Psychiatry: A & O x 3, Mood & affect appropriate  Gastrointestinal:  Soft, Non-tender, + BS	  Skin: No rashes, No ecchymoses, No cyanosis	  Neurologic: Non-focal  Extremities: Normal range of motion, No clubbing, cyanosis or edema  Vascular: Peripheral pulses palpable 2+ bilaterally    LABS:	 	    CBC Full  -  ( 18 Sep 2017 07:16 )  WBC Count : 13.69 K/uL  Hemoglobin : 8.8 g/dL  Hematocrit : 26.1 %  Platelet Count - Automated : 314 K/uL  Mean Cell Volume : 80.3 fl  Mean Cell Hemoglobin : 27.1 pg  Mean Cell Hemoglobin Concentration : 33.7 gm/dL  Auto Neutrophil # : x  Auto Lymphocyte # : x  Auto Monocyte # : x  Auto Eosinophil # : x  Auto Basophil # : x  Auto Neutrophil % : x  Auto Lymphocyte % : x  Auto Monocyte % : x  Auto Eosinophil % : x  Auto Basophil % : x    09-17    135  |  90<L>  |  48<H>  ----------------------------<  97  3.5   |  22  |  5.05<H>  09-17    134<L>  |  90<L>  |  72<H>  ----------------------------<  127<H>  5.0   |  20<L>  |  7.22<H>    Ca    9.3      17 Sep 2017 21:48  Ca    9.7      17 Sep 2017 20:32  Phos  10.9     09-17  Mg     2.7     09-17    TPro  7.4  /  Alb  3.4  /  TBili  0.5  /  DBili  x   /  AST  29  /  ALT  23  /  AlkPhos  106  09-17    TELEMETRY: sinus 70-80      < from: TTE with Doppler (w/Cont) (09.13.17 @ 22:23) >  Dimensions:    Normal Values:  LA:     4.4    2.0 - 4.0 cm  Ao:            2.0 - 3.8 cm  SEPTUM: 1.2    0.6 - 1.2 cm  PWT:    1.0    0.6 - 1.1 cm  LVIDd:  5.2    3.0 - 5.6 cm  LVIDs:  4.2    1.8 - 4.0 cm  Derived variables:  LVMI: 113 g/m2  RWT: 0.38  Fractional short: 19 %  EF (Visual Estimate): 35 %  Doppler Peak Velocity (m/sec): AoV=1.9    < end of copied text >  < from: TTE with Doppler (w/Cont) (09.13.17 @ 22:23) >  ------------------------------------------------------------------------  Conclusions:  1. Mild mitral annular calcification. Thickened and  tethered mitral valve leaflets with normal opening.  Mild-moderate mitral regurgitation.  2. Aortic valve not well visualized; appears calcified.  Peak transaortic valve gradient equals 14 mm Hg, mean  transaortic valve gradient equals 7 mm Hg, estimated aortic  valve area equals 1.4 sqcm (by continuity equation), aortic  valve velocity time integral equals 41 cm, consistent with  moderate aortic stenosis. No aortic valve regurgitation  seen.  3. Mild left ventricular enlargement. Eccentric left  ventricular hypertrophy (dilated left ventricle with normal  relative wall thickness).  4. Endocardial visualization enhanced with intravenous  injection of echo contrast (Definity). Moderate-severe  segmental left ventricular systolic dysfunction. The mid to  distal septum, mid to distal inferior, and the apical cap  are akinetic.  No left ventricular thrombus.  5. Normal right ventricular size and systolic function.  6. Thickened pericardium inferior to the right heart. Trace  pericardial effusion adjacent to the right heart. No  evidence of hemodynamic compromise.  *** No previous Echo exam.  ------------------------------------------------------------------------    < end of copied text >    < from: Cardiac Cath Lab - Adult (09.12.17 @ 18:38) >  PROCEDURE:  --  Left heart catheterization.  --  Left coronary angiography.  --  Right coronary angiography.    < end of copied text >  	    ASSESSMENT/PLAN: 	    72 year old man w/ CAD with multiple stent, DM2, HTN, HLD, COPD, LIN on CKD on HD, initially presented with pyelonephritis, course c/b LIN on CKD, c/b NSTEMI s/p impella assisted BABAR x 5 (LAD and RCA) on 9/14/2017       1. NSTEMI - s/p PCI  -cont DAPT, cont statin 80mg daily  -cont bblocker, ranexa  -lifevest eval    2. HFrEF -   -cont metoprolol succinate 150mg daily, imdur  **please add hydralazine 10mg tid; inc to 25mg tid if tolerates **titrate up hydralazine as titrate off nifedipine setting HFrEF  -eventual ACE/ARB when renal function stabilizes - likely outpatient  -continue fluid removal during HD  -monitor on tele    05503 24H hour events: No acute events.    MEDICATIONS:  aspirin enteric coated 81 milliGRAM(s) Oral daily  tamsulosin 0.4 milliGRAM(s) Oral at bedtime  isosorbide   mononitrate ER Tablet (IMDUR) 30 milliGRAM(s) Oral daily  ranolazine 1000 milliGRAM(s) Oral two times a day  clopidogrel Tablet 75 milliGRAM(s) Oral daily  NIFEdipine XL 60 milliGRAM(s) Oral daily  metoprolol succinate  milliGRAM(s) Oral daily  heparin  Injectable 5000 Unit(s) SubCutaneous every 8 hours    cefTRIAXone   IVPB 1 Gram(s) IV Intermittent every 24 hours  cefTRIAXone   IVPB        buDESOnide  80 MICROgram(s)/formoterol 4.5 MICROgram(s) Inhaler 2 Puff(s) Inhalation two times a day  ALBUTerol    90 MICROgram(s) HFA Inhaler 2 Puff(s) Inhalation every 6 hours PRN    busPIRone 5 milliGRAM(s) Oral two times a day  acetaminophen   Tablet. 650 milliGRAM(s) Oral every 6 hours PRN  oxyCODONE    IR 5 milliGRAM(s) Oral every 4 hours PRN      insulin lispro (HumaLOG) corrective regimen sliding scale   SubCutaneous three times a day before meals  insulin lispro (HumaLOG) corrective regimen sliding scale   SubCutaneous at bedtime  dextrose Gel 1 Dose(s) Oral once PRN  dextrose 50% Injectable 12.5 Gram(s) IV Push once  dextrose 50% Injectable 25 Gram(s) IV Push once  dextrose 50% Injectable 25 Gram(s) IV Push once  glucagon  Injectable 1 milliGRAM(s) IntraMuscular once PRN  atorvastatin 80 milliGRAM(s) Oral at bedtime    dextrose 5%. 1000 milliLiter(s) IV Continuous <Continuous>      REVIEW OF SYSTEMS:  Denies CP, SOB, orthopnea. Complains of R flank pain  Remaining 10point ROS negative.    PHYSICAL EXAM:  T(C): 36.7 (09-18-17 @ 04:15), Max: 36.9 (09-17-17 @ 17:20)  HR: 78 (09-18-17 @ 05:02) (72 - 78)  BP: 118/69 (09-18-17 @ 05:02) (105/65 - 133/70)  RR: 19 (09-18-17 @ 04:15) (18 - 22)  SpO2: 94% (09-18-17 @ 04:15) (92% - 100%)  Wt(kg): --  I&O's Summary    17 Sep 2017 07:01  -  18 Sep 2017 07:00  --------------------------------------------------------  IN: 560 mL / OUT: 1280 mL / NET: -720 mL    18 Sep 2017 07:01  -  18 Sep 2017 10:17  --------------------------------------------------------  IN: 220 mL / OUT: 200 mL / NET: 20 mL    Appearance: Normal	  HEENT:   Normal oral mucosa, PERRL, EOMI	  Lymphatic: No lymphadenopathy  Cardiovascular: Normal S1 S2, No JVD, No murmurs, No edema  left femoral temp HD cath  right fem access small hematoma, nontender, distal pulses doplerable  Respiratory: Lungs clear to auscultation	  Psychiatry: A & O x 3, Mood & affect appropriate  Gastrointestinal:  Soft, Non-tender, + BS	  Skin: No rashes, No ecchymoses, No cyanosis	  Neurologic: Non-focal  Extremities: Normal range of motion, No clubbing, cyanosis or edema  Vascular: Peripheral pulses palpable 2+ bilaterally    LABS:	 	    CBC Full  -  ( 18 Sep 2017 07:16 )  WBC Count : 13.69 K/uL  Hemoglobin : 8.8 g/dL  Hematocrit : 26.1 %  Platelet Count - Automated : 314 K/uL  Mean Cell Volume : 80.3 fl  Mean Cell Hemoglobin : 27.1 pg  Mean Cell Hemoglobin Concentration : 33.7 gm/dL  Auto Neutrophil # : x  Auto Lymphocyte # : x  Auto Monocyte # : x  Auto Eosinophil # : x  Auto Basophil # : x  Auto Neutrophil % : x  Auto Lymphocyte % : x  Auto Monocyte % : x  Auto Eosinophil % : x  Auto Basophil % : x    09-17    135  |  90<L>  |  48<H>  ----------------------------<  97  3.5   |  22  |  5.05<H>  09-17    134<L>  |  90<L>  |  72<H>  ----------------------------<  127<H>  5.0   |  20<L>  |  7.22<H>    Ca    9.3      17 Sep 2017 21:48  Ca    9.7      17 Sep 2017 20:32  Phos  10.9     09-17  Mg     2.7     09-17    TPro  7.4  /  Alb  3.4  /  TBili  0.5  /  DBili  x   /  AST  29  /  ALT  23  /  AlkPhos  106  09-17    TELEMETRY: sinus 70-80      < from: TTE with Doppler (w/Cont) (09.13.17 @ 22:23) >  Dimensions:    Normal Values:  LA:     4.4    2.0 - 4.0 cm  Ao:            2.0 - 3.8 cm  SEPTUM: 1.2    0.6 - 1.2 cm  PWT:    1.0    0.6 - 1.1 cm  LVIDd:  5.2    3.0 - 5.6 cm  LVIDs:  4.2    1.8 - 4.0 cm  Derived variables:  LVMI: 113 g/m2  RWT: 0.38  Fractional short: 19 %  EF (Visual Estimate): 35 %  Doppler Peak Velocity (m/sec): AoV=1.9    < end of copied text >  < from: TTE with Doppler (w/Cont) (09.13.17 @ 22:23) >  ------------------------------------------------------------------------  Conclusions:  1. Mild mitral annular calcification. Thickened and  tethered mitral valve leaflets with normal opening.  Mild-moderate mitral regurgitation.  2. Aortic valve not well visualized; appears calcified.  Peak transaortic valve gradient equals 14 mm Hg, mean  transaortic valve gradient equals 7 mm Hg, estimated aortic  valve area equals 1.4 sqcm (by continuity equation), aortic  valve velocity time integral equals 41 cm, consistent with  moderate aortic stenosis. No aortic valve regurgitation  seen.  3. Mild left ventricular enlargement. Eccentric left  ventricular hypertrophy (dilated left ventricle with normal  relative wall thickness).  4. Endocardial visualization enhanced with intravenous  injection of echo contrast (Definity). Moderate-severe  segmental left ventricular systolic dysfunction. The mid to  distal septum, mid to distal inferior, and the apical cap  are akinetic.  No left ventricular thrombus.  5. Normal right ventricular size and systolic function.  6. Thickened pericardium inferior to the right heart. Trace  pericardial effusion adjacent to the right heart. No  evidence of hemodynamic compromise.  *** No previous Echo exam.  ------------------------------------------------------------------------    < end of copied text >    < from: Cardiac Cath Lab - Adult (09.12.17 @ 18:38) >  PROCEDURE:  --  Left heart catheterization.  --  Left coronary angiography.  --  Right coronary angiography.    < end of copied text >  	    ASSESSMENT/PLAN: 	    72 year old man w/ CAD with multiple stent, DM2, HTN, HLD, COPD, LIN on CKD on HD, initially presented with pyelonephritis, course c/b LIN on CKD, c/b NSTEMI s/p impella assisted BABAR x 5 (LAD and RCA) on 9/14/2017       1. NSTEMI - s/p PCI  -cont DAPT, cont statin 80mg daily  **to whom it may concern, please maintain on dual antiplatelets in light of recent coronary stenting unless life threatening bleed or high risk procedure  -cont bblocker, ranexa  -lifevest eval    2. HFrEF -   -cont metoprolol succinate 150mg daily, imdur  **please add hydralazine 10mg tid; inc to 25mg tid if tolerates **titrate up hydralazine as titrate off nifedipine setting HFrEF  -eventual ACE/ARB when renal function stabilizes - likely outpatient  -continue fluid removal during HD  -monitor on tele    23054 24H hour events: No acute events.    MEDICATIONS:  aspirin enteric coated 81 milliGRAM(s) Oral daily  tamsulosin 0.4 milliGRAM(s) Oral at bedtime  isosorbide   mononitrate ER Tablet (IMDUR) 30 milliGRAM(s) Oral daily  ranolazine 1000 milliGRAM(s) Oral two times a day  clopidogrel Tablet 75 milliGRAM(s) Oral daily  NIFEdipine XL 60 milliGRAM(s) Oral daily  metoprolol succinate  milliGRAM(s) Oral daily  heparin  Injectable 5000 Unit(s) SubCutaneous every 8 hours    cefTRIAXone   IVPB 1 Gram(s) IV Intermittent every 24 hours  cefTRIAXone   IVPB        buDESOnide  80 MICROgram(s)/formoterol 4.5 MICROgram(s) Inhaler 2 Puff(s) Inhalation two times a day  ALBUTerol    90 MICROgram(s) HFA Inhaler 2 Puff(s) Inhalation every 6 hours PRN    busPIRone 5 milliGRAM(s) Oral two times a day  acetaminophen   Tablet. 650 milliGRAM(s) Oral every 6 hours PRN  oxyCODONE    IR 5 milliGRAM(s) Oral every 4 hours PRN      insulin lispro (HumaLOG) corrective regimen sliding scale   SubCutaneous three times a day before meals  insulin lispro (HumaLOG) corrective regimen sliding scale   SubCutaneous at bedtime  dextrose Gel 1 Dose(s) Oral once PRN  dextrose 50% Injectable 12.5 Gram(s) IV Push once  dextrose 50% Injectable 25 Gram(s) IV Push once  dextrose 50% Injectable 25 Gram(s) IV Push once  glucagon  Injectable 1 milliGRAM(s) IntraMuscular once PRN  atorvastatin 80 milliGRAM(s) Oral at bedtime    dextrose 5%. 1000 milliLiter(s) IV Continuous <Continuous>      REVIEW OF SYSTEMS:  Denies CP, SOB, orthopnea. Complains of R flank pain  Remaining 10point ROS negative.    PHYSICAL EXAM:  T(C): 36.7 (09-18-17 @ 04:15), Max: 36.9 (09-17-17 @ 17:20)  HR: 78 (09-18-17 @ 05:02) (72 - 78)  BP: 118/69 (09-18-17 @ 05:02) (105/65 - 133/70)  RR: 19 (09-18-17 @ 04:15) (18 - 22)  SpO2: 94% (09-18-17 @ 04:15) (92% - 100%)  Wt(kg): --  I&O's Summary    17 Sep 2017 07:01  -  18 Sep 2017 07:00  --------------------------------------------------------  IN: 560 mL / OUT: 1280 mL / NET: -720 mL    18 Sep 2017 07:01  -  18 Sep 2017 10:17  --------------------------------------------------------  IN: 220 mL / OUT: 200 mL / NET: 20 mL    Appearance: Normal	  HEENT:   Normal oral mucosa, PERRL, EOMI	  Lymphatic: No lymphadenopathy  Cardiovascular: Normal S1 S2, No JVD, No murmurs, No edema  left femoral temp HD cath  right fem access small hematoma, nontender, distal pulses doplerable  Respiratory: Lungs clear to auscultation	  Psychiatry: A & O x 3, Mood & affect appropriate  Gastrointestinal:  Soft, Non-tender, + BS	  Skin: No rashes, No ecchymoses, No cyanosis	  Neurologic: Non-focal  Extremities: Normal range of motion, No clubbing, cyanosis or edema  Vascular: Peripheral pulses palpable 2+ bilaterally    LABS:	 	    CBC Full  -  ( 18 Sep 2017 07:16 )  WBC Count : 13.69 K/uL  Hemoglobin : 8.8 g/dL  Hematocrit : 26.1 %  Platelet Count - Automated : 314 K/uL  Mean Cell Volume : 80.3 fl  Mean Cell Hemoglobin : 27.1 pg  Mean Cell Hemoglobin Concentration : 33.7 gm/dL  Auto Neutrophil # : x  Auto Lymphocyte # : x  Auto Monocyte # : x  Auto Eosinophil # : x  Auto Basophil # : x  Auto Neutrophil % : x  Auto Lymphocyte % : x  Auto Monocyte % : x  Auto Eosinophil % : x  Auto Basophil % : x    09-17    135  |  90<L>  |  48<H>  ----------------------------<  97  3.5   |  22  |  5.05<H>  09-17    134<L>  |  90<L>  |  72<H>  ----------------------------<  127<H>  5.0   |  20<L>  |  7.22<H>    Ca    9.3      17 Sep 2017 21:48  Ca    9.7      17 Sep 2017 20:32  Phos  10.9     09-17  Mg     2.7     09-17    TPro  7.4  /  Alb  3.4  /  TBili  0.5  /  DBili  x   /  AST  29  /  ALT  23  /  AlkPhos  106  09-17    TELEMETRY: sinus 70-80      < from: TTE with Doppler (w/Cont) (09.13.17 @ 22:23) >  Dimensions:    Normal Values:  LA:     4.4    2.0 - 4.0 cm  Ao:            2.0 - 3.8 cm  SEPTUM: 1.2    0.6 - 1.2 cm  PWT:    1.0    0.6 - 1.1 cm  LVIDd:  5.2    3.0 - 5.6 cm  LVIDs:  4.2    1.8 - 4.0 cm  Derived variables:  LVMI: 113 g/m2  RWT: 0.38  Fractional short: 19 %  EF (Visual Estimate): 35 %  Doppler Peak Velocity (m/sec): AoV=1.9    < end of copied text >  < from: TTE with Doppler (w/Cont) (09.13.17 @ 22:23) >  ------------------------------------------------------------------------  Conclusions:  1. Mild mitral annular calcification. Thickened and  tethered mitral valve leaflets with normal opening.  Mild-moderate mitral regurgitation.  2. Aortic valve not well visualized; appears calcified.  Peak transaortic valve gradient equals 14 mm Hg, mean  transaortic valve gradient equals 7 mm Hg, estimated aortic  valve area equals 1.4 sqcm (by continuity equation), aortic  valve velocity time integral equals 41 cm, consistent with  moderate aortic stenosis. No aortic valve regurgitation  seen.  3. Mild left ventricular enlargement. Eccentric left  ventricular hypertrophy (dilated left ventricle with normal  relative wall thickness).  4. Endocardial visualization enhanced with intravenous  injection of echo contrast (Definity). Moderate-severe  segmental left ventricular systolic dysfunction. The mid to  distal septum, mid to distal inferior, and the apical cap  are akinetic.  No left ventricular thrombus.  5. Normal right ventricular size and systolic function.  6. Thickened pericardium inferior to the right heart. Trace  pericardial effusion adjacent to the right heart. No  evidence of hemodynamic compromise.  *** No previous Echo exam.  ------------------------------------------------------------------------    < end of copied text >    < from: Cardiac Cath Lab - Adult (09.12.17 @ 18:38) >  PROCEDURE:  --  Left heart catheterization.  --  Left coronary angiography.  --  Right coronary angiography.    < end of copied text >  	    ASSESSMENT/PLAN: 	    72 year old man w/ CAD with multiple stent, DM2, HTN, HLD, COPD, LIN on CKD on HD, initially presented with pyelonephritis, course c/b LIN on CKD, c/b NSTEMI s/p impella assisted BABAR x 5 (LAD and RCA) on 9/14/2017       1. NSTEMI - s/p PCI  -cont DAPT, cont statin 80mg daily  **to whom it may concern, please maintain on dual antiplatelets in light of recent coronary stenting unless life threatening bleed or high risk procedure  **anesthesia request preop eval - rcri score 3 (11% risk major cardiac event) patient is high risk; please cont periop beta blocker  -cont bblocker, ranexa  -lifevest eval    2. HFrEF -   -cont metoprolol succinate 150mg daily, imdur  **please add hydralazine 10mg tid; inc to 25mg tid if tolerates **titrate up hydralazine as titrate off nifedipine setting HFrEF  -eventual ACE/ARB when renal function stabilizes - likely outpatient  -continue fluid removal during HD  -monitor on tele    32023 24H hour events: No acute events.  c/o weakness/ fatigue  MEDICATIONS:  aspirin enteric coated 81 milliGRAM(s) Oral daily  tamsulosin 0.4 milliGRAM(s) Oral at bedtime  isosorbide   mononitrate ER Tablet (IMDUR) 30 milliGRAM(s) Oral daily  ranolazine 1000 milliGRAM(s) Oral two times a day  clopidogrel Tablet 75 milliGRAM(s) Oral daily  NIFEdipine XL 60 milliGRAM(s) Oral daily  metoprolol succinate  milliGRAM(s) Oral daily  heparin  Injectable 5000 Unit(s) SubCutaneous every 8 hours    cefTRIAXone   IVPB 1 Gram(s) IV Intermittent every 24 hours  cefTRIAXone   IVPB        buDESOnide  80 MICROgram(s)/formoterol 4.5 MICROgram(s) Inhaler 2 Puff(s) Inhalation two times a day  ALBUTerol    90 MICROgram(s) HFA Inhaler 2 Puff(s) Inhalation every 6 hours PRN    busPIRone 5 milliGRAM(s) Oral two times a day  acetaminophen   Tablet. 650 milliGRAM(s) Oral every 6 hours PRN  oxyCODONE    IR 5 milliGRAM(s) Oral every 4 hours PRN      insulin lispro (HumaLOG) corrective regimen sliding scale   SubCutaneous three times a day before meals  insulin lispro (HumaLOG) corrective regimen sliding scale   SubCutaneous at bedtime  dextrose Gel 1 Dose(s) Oral once PRN  dextrose 50% Injectable 12.5 Gram(s) IV Push once  dextrose 50% Injectable 25 Gram(s) IV Push once  dextrose 50% Injectable 25 Gram(s) IV Push once  glucagon  Injectable 1 milliGRAM(s) IntraMuscular once PRN  atorvastatin 80 milliGRAM(s) Oral at bedtime    dextrose 5%. 1000 milliLiter(s) IV Continuous <Continuous>      REVIEW OF SYSTEMS:  Denies CP, SOB, orthopnea. Complains of R flank pain  Remaining 10point ROS negative.    PHYSICAL EXAM:  T(C): 36.7 (09-18-17 @ 04:15), Max: 36.9 (09-17-17 @ 17:20)  HR: 78 (09-18-17 @ 05:02) (72 - 78)  BP: 118/69 (09-18-17 @ 05:02) (105/65 - 133/70)  RR: 19 (09-18-17 @ 04:15) (18 - 22)  SpO2: 94% (09-18-17 @ 04:15) (92% - 100%)  Wt(kg): --  I&O's Summary    17 Sep 2017 07:01  -  18 Sep 2017 07:00  --------------------------------------------------------  IN: 560 mL / OUT: 1280 mL / NET: -720 mL    18 Sep 2017 07:01  -  18 Sep 2017 10:17  --------------------------------------------------------  IN: 220 mL / OUT: 200 mL / NET: 20 mL    Appearance: Normal	  HEENT:   Normal oral mucosa, PERRL, EOMI	  Lymphatic: No lymphadenopathy  Cardiovascular: Normal S1 S2, No JVD, No murmurs, No edema  left femoral temp HD cath  right fem access small hematoma, nontender, distal pulses doplerable  Respiratory: Lungs clear to auscultation	  Psychiatry: A & O x 3, Mood & affect appropriate  Gastrointestinal:  Soft, Non-tender, + BS	  Skin: No rashes, No ecchymoses, No cyanosis	  Neurologic: Non-focal  Extremities: Normal range of motion, No clubbing, cyanosis or edema  Vascular: Peripheral pulses palpable 2+ bilaterally    LABS:	 	    CBC Full  -  ( 18 Sep 2017 07:16 )  WBC Count : 13.69 K/uL  Hemoglobin : 8.8 g/dL  Hematocrit : 26.1 %  Platelet Count - Automated : 314 K/uL  Mean Cell Volume : 80.3 fl  Mean Cell Hemoglobin : 27.1 pg  Mean Cell Hemoglobin Concentration : 33.7 gm/dL  Auto Neutrophil # : x  Auto Lymphocyte # : x  Auto Monocyte # : x  Auto Eosinophil # : x  Auto Basophil # : x  Auto Neutrophil % : x  Auto Lymphocyte % : x  Auto Monocyte % : x  Auto Eosinophil % : x  Auto Basophil % : x    09-17    135  |  90<L>  |  48<H>  ----------------------------<  97  3.5   |  22  |  5.05<H>  09-17    134<L>  |  90<L>  |  72<H>  ----------------------------<  127<H>  5.0   |  20<L>  |  7.22<H>    Ca    9.3      17 Sep 2017 21:48  Ca    9.7      17 Sep 2017 20:32  Phos  10.9     09-17  Mg     2.7     09-17    TPro  7.4  /  Alb  3.4  /  TBili  0.5  /  DBili  x   /  AST  29  /  ALT  23  /  AlkPhos  106  09-17    TELEMETRY: sinus 70-80      < from: TTE with Doppler (w/Cont) (09.13.17 @ 22:23) >  Dimensions:    Normal Values:  LA:     4.4    2.0 - 4.0 cm  Ao:            2.0 - 3.8 cm  SEPTUM: 1.2    0.6 - 1.2 cm  PWT:    1.0    0.6 - 1.1 cm  LVIDd:  5.2    3.0 - 5.6 cm  LVIDs:  4.2    1.8 - 4.0 cm  Derived variables:  LVMI: 113 g/m2  RWT: 0.38  Fractional short: 19 %  EF (Visual Estimate): 35 %  Doppler Peak Velocity (m/sec): AoV=1.9    < end of copied text >  < from: TTE with Doppler (w/Cont) (09.13.17 @ 22:23) >  ------------------------------------------------------------------------  Conclusions:  1. Mild mitral annular calcification. Thickened and  tethered mitral valve leaflets with normal opening.  Mild-moderate mitral regurgitation.  2. Aortic valve not well visualized; appears calcified.  Peak transaortic valve gradient equals 14 mm Hg, mean  transaortic valve gradient equals 7 mm Hg, estimated aortic  valve area equals 1.4 sqcm (by continuity equation), aortic  valve velocity time integral equals 41 cm, consistent with  moderate aortic stenosis. No aortic valve regurgitation  seen.  3. Mild left ventricular enlargement. Eccentric left  ventricular hypertrophy (dilated left ventricle with normal  relative wall thickness).  4. Endocardial visualization enhanced with intravenous  injection of echo contrast (Definity). Moderate-severe  segmental left ventricular systolic dysfunction. The mid to  distal septum, mid to distal inferior, and the apical cap  are akinetic.  No left ventricular thrombus.  5. Normal right ventricular size and systolic function.  6. Thickened pericardium inferior to the right heart. Trace  pericardial effusion adjacent to the right heart. No  evidence of hemodynamic compromise.  *** No previous Echo exam.  ------------------------------------------------------------------------    < end of copied text >    < from: Cardiac Cath Lab - Adult (09.12.17 @ 18:38) >  PROCEDURE:  --  Left heart catheterization.  --  Left coronary angiography.  --  Right coronary angiography.    < end of copied text >  	    ASSESSMENT/PLAN: 	    72 year old man w/ CAD with multiple stent, DM2, HTN, HLD, COPD, LIN on CKD on HD, initially presented with pyelonephritis, course c/b LIN on CKD, c/b NSTEMI s/p impella assisted BABAR x 5 (LAD and RCA) on 9/14/2017       1. NSTEMI - s/p PCI  -cont DAPT, cont statin 80mg daily  **to whom it may concern, please maintain on dual antiplatelets in light of recent coronary stenting unless life threatening bleed or high risk procedure  **anesthesia request preop eval - rcri score 3 (11% risk major cardiac event) patient is high risk; please cont periop beta blocker  -cont bblocker, ranexa  -lifevest eval    2. HFrEF -   -cont metoprolol succinate 150mg daily, imdur  **please add hydralazine 10mg tid; inc to 25mg tid if tolerates **titrate up hydralazine as titrate off nifedipine setting HFrEF  -eventual ACE/ARB when renal function stabilizes - likely outpatient  -continue fluid removal during HD  -monitor on tele    [id number]

## 2017-09-18 NOTE — PROGRESS NOTE ADULT - PROBLEM SELECTOR PLAN 1
PMH CAD w/ multiple stents. Diagnostic cath 9/12 diffuse 99% stenosis in mid LAD, 30% stenosis in mid PCX, 80% stenosis in mid RCA, 85% distal RCA. Therapeutic cath 9/14 - 2 stents in LAD, 3 in RCA with Impella assistance.   - continues to have intermittent chest pain, worse when overloaded  - Jocelyn downtrending, stopped checking  - c/w ASA, plavix, lipitor, BB  - c/w ranexa, imdur  - c/w procardia xl  - appreciate cards recs

## 2017-09-18 NOTE — PROGRESS NOTE ADULT - PROBLEM SELECTOR PLAN 6
- Patient started on ceftriaxone ~9/6 for pyelonephritis. Day 12/14 today.  - c/w ceftriaxone  - ID following - Patient started on ceftriaxone ~9/6 for pyelonephritis. Day 13 today.  - c/w ceftriaxone  - ID following  - given increased WBC today, will get UClx, BClx

## 2017-09-19 LAB
ANION GAP SERPL CALC-SCNC: 22 MMOL/L — HIGH (ref 5–17)
ANION GAP SERPL CALC-SCNC: 27 MMOL/L — HIGH (ref 5–17)
ANION GAP SERPL CALC-SCNC: 28 MMOL/L — HIGH (ref 5–17)
BASOPHILS # BLD AUTO: 0.04 K/UL — SIGNIFICANT CHANGE UP (ref 0–0.2)
BASOPHILS NFR BLD AUTO: 0.2 % — SIGNIFICANT CHANGE UP (ref 0–2)
BUN SERPL-MCNC: 68 MG/DL — HIGH (ref 7–23)
BUN SERPL-MCNC: 76 MG/DL — HIGH (ref 7–23)
BUN SERPL-MCNC: 90 MG/DL — HIGH (ref 7–23)
CALCIUM SERPL-MCNC: 9.4 MG/DL — SIGNIFICANT CHANGE UP (ref 8.4–10.5)
CALCIUM SERPL-MCNC: 9.6 MG/DL — SIGNIFICANT CHANGE UP (ref 8.4–10.5)
CALCIUM SERPL-MCNC: 9.8 MG/DL — SIGNIFICANT CHANGE UP (ref 8.4–10.5)
CHLORIDE SERPL-SCNC: 84 MMOL/L — LOW (ref 96–108)
CHLORIDE SERPL-SCNC: 85 MMOL/L — LOW (ref 96–108)
CHLORIDE SERPL-SCNC: 87 MMOL/L — LOW (ref 96–108)
CO2 SERPL-SCNC: 17 MMOL/L — LOW (ref 22–31)
CO2 SERPL-SCNC: 19 MMOL/L — LOW (ref 22–31)
CO2 SERPL-SCNC: 22 MMOL/L — SIGNIFICANT CHANGE UP (ref 22–31)
CREAT SERPL-MCNC: 7.46 MG/DL — HIGH (ref 0.5–1.3)
CREAT SERPL-MCNC: 7.75 MG/DL — HIGH (ref 0.5–1.3)
CREAT SERPL-MCNC: 9.28 MG/DL — HIGH (ref 0.5–1.3)
CULTURE RESULTS: NO GROWTH — SIGNIFICANT CHANGE UP
EOSINOPHIL # BLD AUTO: 0.12 K/UL — SIGNIFICANT CHANGE UP (ref 0–0.5)
EOSINOPHIL NFR BLD AUTO: 0.6 % — SIGNIFICANT CHANGE UP (ref 0–6)
GLUCOSE SERPL-MCNC: 101 MG/DL — HIGH (ref 70–99)
GLUCOSE SERPL-MCNC: 119 MG/DL — HIGH (ref 70–99)
GLUCOSE SERPL-MCNC: 86 MG/DL — SIGNIFICANT CHANGE UP (ref 70–99)
HCT VFR BLD CALC: 27.6 % — LOW (ref 39–50)
HGB BLD-MCNC: 9.1 G/DL — LOW (ref 13–17)
IMM GRANULOCYTES NFR BLD AUTO: 0.6 % — SIGNIFICANT CHANGE UP (ref 0–1.5)
LYMPHOCYTES # BLD AUTO: 1.75 K/UL — SIGNIFICANT CHANGE UP (ref 1–3.3)
LYMPHOCYTES # BLD AUTO: 8.6 % — LOW (ref 13–44)
MAGNESIUM SERPL-MCNC: 2.7 MG/DL — HIGH (ref 1.6–2.6)
MCHC RBC-ENTMCNC: 26.8 PG — LOW (ref 27–34)
MCHC RBC-ENTMCNC: 33 GM/DL — SIGNIFICANT CHANGE UP (ref 32–36)
MCV RBC AUTO: 81.2 FL — SIGNIFICANT CHANGE UP (ref 80–100)
MONOCYTES # BLD AUTO: 1.59 K/UL — HIGH (ref 0–0.9)
MONOCYTES NFR BLD AUTO: 7.8 % — SIGNIFICANT CHANGE UP (ref 2–14)
NEUTROPHILS # BLD AUTO: 16.67 K/UL — HIGH (ref 1.8–7.4)
NEUTROPHILS NFR BLD AUTO: 82.2 % — HIGH (ref 43–77)
PHOSPHATE SERPL-MCNC: 9.8 MG/DL — HIGH (ref 2.5–4.5)
PLATELET # BLD AUTO: 393 K/UL — SIGNIFICANT CHANGE UP (ref 150–400)
POTASSIUM SERPL-MCNC: 5.9 MMOL/L — HIGH (ref 3.5–5.3)
POTASSIUM SERPL-MCNC: 6.1 MMOL/L — HIGH (ref 3.5–5.3)
POTASSIUM SERPL-MCNC: 6.4 MMOL/L — CRITICAL HIGH (ref 3.5–5.3)
POTASSIUM SERPL-SCNC: 5.9 MMOL/L — HIGH (ref 3.5–5.3)
POTASSIUM SERPL-SCNC: 6.1 MMOL/L — HIGH (ref 3.5–5.3)
POTASSIUM SERPL-SCNC: 6.4 MMOL/L — CRITICAL HIGH (ref 3.5–5.3)
RBC # BLD: 3.4 M/UL — LOW (ref 4.2–5.8)
RBC # FLD: 13.5 % — SIGNIFICANT CHANGE UP (ref 10.3–14.5)
SODIUM SERPL-SCNC: 130 MMOL/L — LOW (ref 135–145)
SODIUM SERPL-SCNC: 130 MMOL/L — LOW (ref 135–145)
SODIUM SERPL-SCNC: 131 MMOL/L — LOW (ref 135–145)
SPECIMEN SOURCE: SIGNIFICANT CHANGE UP
WBC # BLD: 20.29 K/UL — HIGH (ref 3.8–10.5)
WBC # FLD AUTO: 20.29 K/UL — HIGH (ref 3.8–10.5)

## 2017-09-19 PROCEDURE — 99232 SBSQ HOSP IP/OBS MODERATE 35: CPT | Mod: GC

## 2017-09-19 PROCEDURE — 93010 ELECTROCARDIOGRAM REPORT: CPT

## 2017-09-19 PROCEDURE — 99233 SBSQ HOSP IP/OBS HIGH 50: CPT | Mod: GC

## 2017-09-19 RX ORDER — ALBUTEROL 90 UG/1
2.5 AEROSOL, METERED ORAL ONCE
Qty: 0 | Refills: 0 | Status: COMPLETED | OUTPATIENT
Start: 2017-09-19 | End: 2017-09-19

## 2017-09-19 RX ORDER — INSULIN HUMAN 100 [IU]/ML
10 INJECTION, SOLUTION SUBCUTANEOUS ONCE
Qty: 0 | Refills: 0 | Status: COMPLETED | OUTPATIENT
Start: 2017-09-19 | End: 2017-09-19

## 2017-09-19 RX ORDER — SEVELAMER CARBONATE 2400 MG/1
1600 POWDER, FOR SUSPENSION ORAL
Qty: 0 | Refills: 0 | Status: DISCONTINUED | OUTPATIENT
Start: 2017-09-19 | End: 2017-09-22

## 2017-09-19 RX ORDER — SODIUM POLYSTYRENE SULFONATE 4.1 MEQ/G
30 POWDER, FOR SUSPENSION ORAL ONCE
Qty: 0 | Refills: 0 | Status: COMPLETED | OUTPATIENT
Start: 2017-09-19 | End: 2017-09-19

## 2017-09-19 RX ORDER — DEXTROSE 50 % IN WATER 50 %
50 SYRINGE (ML) INTRAVENOUS ONCE
Qty: 0 | Refills: 0 | Status: COMPLETED | OUTPATIENT
Start: 2017-09-19 | End: 2017-09-19

## 2017-09-19 RX ORDER — SODIUM POLYSTYRENE SULFONATE 4.1 MEQ/G
15 POWDER, FOR SUSPENSION ORAL EVERY 4 HOURS
Qty: 0 | Refills: 0 | Status: DISCONTINUED | OUTPATIENT
Start: 2017-09-19 | End: 2017-09-20

## 2017-09-19 RX ORDER — INSULIN HUMAN 100 [IU]/ML
10 INJECTION, SOLUTION SUBCUTANEOUS ONCE
Qty: 0 | Refills: 0 | Status: DISCONTINUED | OUTPATIENT
Start: 2017-09-19 | End: 2017-09-19

## 2017-09-19 RX ADMIN — SEVELAMER CARBONATE 1600 MILLIGRAM(S): 2400 POWDER, FOR SUSPENSION ORAL at 12:05

## 2017-09-19 RX ADMIN — Medication 10 MILLIGRAM(S): at 21:24

## 2017-09-19 RX ADMIN — Medication 5 MILLIGRAM(S): at 05:04

## 2017-09-19 RX ADMIN — CLOPIDOGREL BISULFATE 75 MILLIGRAM(S): 75 TABLET, FILM COATED ORAL at 12:05

## 2017-09-19 RX ADMIN — SODIUM POLYSTYRENE SULFONATE 15 GRAM(S): 4.1 POWDER, FOR SUSPENSION ORAL at 21:24

## 2017-09-19 RX ADMIN — Medication 81 MILLIGRAM(S): at 12:05

## 2017-09-19 RX ADMIN — ALBUTEROL 2.5 MILLIGRAM(S): 90 AEROSOL, METERED ORAL at 02:20

## 2017-09-19 RX ADMIN — HEPARIN SODIUM 5000 UNIT(S): 5000 INJECTION INTRAVENOUS; SUBCUTANEOUS at 05:04

## 2017-09-19 RX ADMIN — ALBUTEROL 2.5 MILLIGRAM(S): 90 AEROSOL, METERED ORAL at 14:23

## 2017-09-19 RX ADMIN — INSULIN HUMAN 10 UNIT(S): 100 INJECTION, SOLUTION SUBCUTANEOUS at 02:35

## 2017-09-19 RX ADMIN — HEPARIN SODIUM 5000 UNIT(S): 5000 INJECTION INTRAVENOUS; SUBCUTANEOUS at 14:14

## 2017-09-19 RX ADMIN — BUDESONIDE AND FORMOTEROL FUMARATE DIHYDRATE 2 PUFF(S): 160; 4.5 AEROSOL RESPIRATORY (INHALATION) at 05:04

## 2017-09-19 RX ADMIN — ISOSORBIDE MONONITRATE 30 MILLIGRAM(S): 60 TABLET, EXTENDED RELEASE ORAL at 12:04

## 2017-09-19 RX ADMIN — ALBUTEROL 2.5 MILLIGRAM(S): 90 AEROSOL, METERED ORAL at 18:00

## 2017-09-19 RX ADMIN — RANOLAZINE 1000 MILLIGRAM(S): 500 TABLET, FILM COATED, EXTENDED RELEASE ORAL at 18:00

## 2017-09-19 RX ADMIN — INSULIN HUMAN 10 UNIT(S): 100 INJECTION, SOLUTION SUBCUTANEOUS at 14:12

## 2017-09-19 RX ADMIN — Medication 10 MILLIGRAM(S): at 14:15

## 2017-09-19 RX ADMIN — HEPARIN SODIUM 5000 UNIT(S): 5000 INJECTION INTRAVENOUS; SUBCUTANEOUS at 21:24

## 2017-09-19 RX ADMIN — RANOLAZINE 1000 MILLIGRAM(S): 500 TABLET, FILM COATED, EXTENDED RELEASE ORAL at 05:04

## 2017-09-19 RX ADMIN — Medication 50 MILLILITER(S): at 02:35

## 2017-09-19 RX ADMIN — Medication 2: at 12:01

## 2017-09-19 RX ADMIN — TAMSULOSIN HYDROCHLORIDE 0.4 MILLIGRAM(S): 0.4 CAPSULE ORAL at 21:24

## 2017-09-19 RX ADMIN — BUDESONIDE AND FORMOTEROL FUMARATE DIHYDRATE 2 PUFF(S): 160; 4.5 AEROSOL RESPIRATORY (INHALATION) at 18:00

## 2017-09-19 RX ADMIN — SEVELAMER CARBONATE 800 MILLIGRAM(S): 2400 POWDER, FOR SUSPENSION ORAL at 08:17

## 2017-09-19 RX ADMIN — INSULIN HUMAN 10 UNIT(S): 100 INJECTION, SOLUTION SUBCUTANEOUS at 17:53

## 2017-09-19 RX ADMIN — SODIUM POLYSTYRENE SULFONATE 30 GRAM(S): 4.1 POWDER, FOR SUSPENSION ORAL at 17:49

## 2017-09-19 RX ADMIN — Medication 50 MILLILITER(S): at 14:11

## 2017-09-19 RX ADMIN — CEFTRIAXONE 100 GRAM(S): 500 INJECTION, POWDER, FOR SOLUTION INTRAMUSCULAR; INTRAVENOUS at 23:47

## 2017-09-19 RX ADMIN — Medication 50 MILLILITER(S): at 17:54

## 2017-09-19 RX ADMIN — Medication 10 MILLIGRAM(S): at 05:04

## 2017-09-19 RX ADMIN — ATORVASTATIN CALCIUM 80 MILLIGRAM(S): 80 TABLET, FILM COATED ORAL at 21:24

## 2017-09-19 RX ADMIN — SODIUM POLYSTYRENE SULFONATE 30 GRAM(S): 4.1 POWDER, FOR SUSPENSION ORAL at 14:11

## 2017-09-19 RX ADMIN — Medication 5 MILLIGRAM(S): at 18:01

## 2017-09-19 RX ADMIN — Medication 150 MILLIGRAM(S): at 05:04

## 2017-09-19 NOTE — PROGRESS NOTE ADULT - PROBLEM SELECTOR PLAN 6
- Patient started on ceftriaxone ~9/6 for pyelonephritis. Day 13 today.  - c/w ceftriaxone  - ID following  - given increased WBC today, will get UClx, BClx - Patient started on ceftriaxone ~9/6 for pyelonephritis. Day 14 today.  - c/w ceftriaxone  - ID following  - given increased WBC today, pending UClx, BClx results Patient with h/o HTN  - c/w procardia, imdur, metoprolol. Start ACE-I when deemed ESRD.

## 2017-09-19 NOTE — PROGRESS NOTE ADULT - PROBLEM SELECTOR PLAN 4
Patient with hematuria as well as large ecchymoses in L groin, no signs of hematoma.   - steadily downtrending - if continues to downtrend, will consider repeat imaging to reassess hematoma from impella  - not requiring transfusion  - daily CBC Patient w/ hematuria as well as large ecchymoses in L groin, no signs of hematoma.   - steadily downtrending - if continues to downtrend, will consider repeat imaging to reassess hematoma from impella  - not requiring transfusion  - daily CBC Volume status remains an issue (new HD & CHF)  - Echo: EF 35%. Mild-moderate MR, mod AS, mild LV enlargement, eccentric LV hypertrophy (dilated LV w/ nl relative wall thickness). Mod-severe segmental LV sys dysfunction. The mid to distal septum, mid to distal inferior, apical cap akinetic. Nl RV size & sys fxn  - c/w metoprolol succ 150 - will titrate w/ BPs  - c/w ASA, plavix, lipitor  - c/w ranexa, imdur  - c/w procardia xl  - plan to start ACE-I once patient is deemed ESRD and HD dependant Volume status remains an issue (new HD & CHF)  - Echo: EF 35%. Mild-moderate MR, mod AS, mild LV enlargement, eccentric LV hypertrophy (dilated LV w/ nl relative wall thickness). Mod-severe segmental LV sys dysfunction. The mid to distal septum, mid to distal inferior, apical cap akinetic. Nl RV size & sys fxn  - c/w metoprolol succ 150 - will titrate w/ BPs  - c/w ASA, plavix, lipitor  - c/w ranexa, imdur  - c/w hydralazine 10 tid  - plan to start ACE-I once patient is deemed ESRD and HD dependant

## 2017-09-19 NOTE — CHART NOTE - NSCHARTNOTEFT_GEN_A_CORE
K 6.1, underwent HD 9/18. Post-HD K 5.9. Repeat EKG showed no peaked T waves. With [place]  phone, pt refused to answer questions about whether symptomatic except c/o back pain that improved with repositioning and refused to be examined; he wanted to sleep. Pt was in NAD, refused kayexalate. K 6.1, underwent HD 9/18. Post-HD K 5.9. Repeat EKG showed no peaked T waves. With [place]  phone, pt refused to answer questions about whether symptomatic except c/o back pain that improved with repositioning and refused to be examined; he wanted to sleep. Pt was in NAD, refused kayexalate. Insulin regular 10u x 1, D50, albuterol neb ordered. K 6.1, underwent HD 9/18. Post-HD K 5.9. Repeat EKG showed no peaked T waves. With [place]  phone, pt refused to answer questions about whether symptomatic except c/o back pain that improved with repositioning and refused to be examined; he wanted to sleep. Pt was in NAD. Refused kayexalate even though explained he needed treatment for high potassium which could be dangerous for the heart. Insulin regular 10u x 1, D50, albuterol neb ordered.

## 2017-09-19 NOTE — PROGRESS NOTE ADULT - SUBJECTIVE AND OBJECTIVE BOX
Interventional Radiology     71 y/o Male with PMH CVA (cerebral vascular accident), COPD (chronic obstructive pulmonary disease), BPH (benign prostatic hyperplasia), Bipolar affective disorder, CKD (chronic kidney disease), Pancreatitis, Hypertension, Dyslipidemia, Diabetes mellitus, Coronary artery disease now referred to IR for temporary HD catheter placement.  Previously pt was on HD via groin temp HD Catheter.      Allergies    No Known Allergies    Intolerances      Pertinent labs:                        9.1    20.29 )-----------( 393      ( 19 Sep 2017 07:32 )             27.6     09-19    130<L>  |  85<L>  |  76<H>  ----------------------------<  86  6.1<H>   |  17<L>  |  7.75<H>    Ca    9.8      19 Sep 2017 07:33  Phos  9.8     09-19  Mg     2.7     09-19    TPro  7.6  /  Alb  3.2<L>  /  TBili  0.5  /  DBili  x   /  AST  28  /  ALT  21  /  AlkPhos  102  09-18    Activated Partial Thromboplastin Time in AM (09.16.17 @ 07:20)    Activated Partial Thromboplastin Time: 35.7  Prothrombin Time and INR, Plasma in AM (09.16.17 @ 07:20)    Prothrombin Time, Plasma: 14.9: Effective March 21st, the reference range for PT has changed. sec    INR: 1.37    A/P   71 y/o M with PMH as above with LIN on CKD on HD    Will obtain consent and plan for procedure [place]  via pacific telephone  services (ID# [id number] first name: NOEMI)    Interventional Radiology     71 y/o Male with PMH CVA (cerebral vascular accident), COPD (chronic obstructive pulmonary disease), BPH (benign prostatic hyperplasia), Bipolar affective disorder, CKD (chronic kidney disease), Pancreatitis, Hypertension, Dyslipidemia, Diabetes mellitus, Coronary artery disease now referred to IR for temporary HD catheter placement.  Previously pt was on HD via groin temp HD Catheter that was since removed.    Allergies    No Known Allergies    Intolerances    PE:  Gen Appearance: pt is in NAD    MS: Alert OX3     Pertinent labs:                        9.1    20.29 )-----------( 393      ( 19 Sep 2017 07:32 )             27.6     09-19    130<L>  |  85<L>  |  76<H>  ----------------------------<  86  6.1<H>   |  17<L>  |  7.75<H>    Ca    9.8      19 Sep 2017 07:33  Phos  9.8     09-19  Mg     2.7     09-19    TPro  7.6  /  Alb  3.2<L>  /  TBili  0.5  /  DBili  x   /  AST  28  /  ALT  21  /  AlkPhos  102  09-18    Activated Partial Thromboplastin Time in AM (09.16.17 @ 07:20)    Activated Partial Thromboplastin Time: 35.7  Prothrombin Time and INR, Plasma in AM (09.16.17 @ 07:20)    Prothrombin Time, Plasma: 14.9: Effective March 21st, the reference range for PT has changed. sec    INR: 1.37    A/P   71 y/o M with PMH as above with LIN on CKD on HD    After discussion with the patient at length with [place]  via pacific telephone  services (ID# [id number] first name: NOEMI) and explaining the risks, benefits, alternatives of the procedure and necessity of the procedure and the risks of not having dialysis including death the patient continued to refuse the procedure.  The primary team intern Dr. RITA White came to IR and also spoke with the patient using the same [place] telephone  and the patient maintained his decision to refuse the procedure.  As a result of the patient's decision the procedure was cancelled and pt was returned to [address].  Primary team is aware of the above.      HIEN Guzman  [facility] [id number]  Ext [phone]

## 2017-09-19 NOTE — PROGRESS NOTE ADULT - PROBLEM SELECTOR PLAN 3
- Acute renal failure on CKD in setting of obstructed R kidney (L kidney atrophic).   - c/w HD, next today  - R fem shiley > one week old  - readdress more permanent access today with IR now that patient more stable following cardiac interventions  - Urology will proceed with ureteral stent, awaiting urine clx and cardiac clearance  - mild hematuria, started with DAPTs, continue to monitor, urology following  - K 6.1 today - given albuterol/insulin/glucose - will get HD this afternoon - Acute renal failure on CKD in setting of obstructed R kidney (L kidney atrophic).   - c/w HD, last yesterday  - R fem shiley > one week old - IR to replace today  - Urology will proceed with ureteral stent, awaiting urine clx and cardiac clearance  - mild hematuria, started with DAPTs, continue to monitor, urology following  - monitor daily K and BMP CAD w/ multiple stents. s/p cath 9/14 - 2 stents LAD, 3 RCA w/ Impella assistance.   - continues to have intermittent chest pain, worse when overloaded  - c/w ASA, plavix, lipitor, BB  - c/w ranexa, imdur  - c/w procardia xl  - appreciate cards recs CAD w/ multiple stents. s/p cath 9/14 - 2 stents LAD, 3 RCA w/ Impella assistance.   - continues to have intermittent chest pain, worse when overloaded  - c/w ASA, plavix, lipitor, BB  - c/w ranexa, imdur  - c/w hydralazine 10 tid  - appreciate cards recs

## 2017-09-19 NOTE — PROGRESS NOTE ADULT - ASSESSMENT
72M PMH extensive CAD with large # of stents, CKD, COPD, T2DM, ?bipolar, prior CVA transferred from OSH for NSTEMI. Initially had been admitted for pyelonephritis complicated by ARF (due to obstructing stones and infection), required intubation for hypoxic respiratory failure due to pulmonary edema, now requiring HD for his renal failure. Patient now s/p 5 stent placement with acute/chronic HFrEF. Very high risk candidate for urologic intervention to decompress kidney. Likely to require lifelong HD. 72M PMH extensive CAD with large # of stents, CKD, COPD, T2DM, ?bipolar, prior CVA transferred from OSH for NSTEMI. Initially had been admitted for pyelonephritis complicated by ARF (due to obstructing stones and infection), required intubation for hypoxic respiratory failure due to pulmonary edema, now requiring HD for his renal failure. Patient now s/p 5 stent placement with acute/chronic HFrEF. Urology will likely do ureteral stent to decompress kidney after clearance by ID and cardiology.

## 2017-09-19 NOTE — PROGRESS NOTE ADULT - PROBLEM SELECTOR PLAN 1
PMH CAD w/ multiple stents. Diagnostic cath 9/12 diffuse 99% stenosis in mid LAD, 30% stenosis in mid PCX, 80% stenosis in mid RCA, 85% distal RCA. Therapeutic cath 9/14 - 2 stents in LAD, 3 in RCA with Impella assistance.   - continues to have intermittent chest pain, worse when overloaded  - Jocelyn downtrending, stopped checking  - c/w ASA, plavix, lipitor, BB  - c/w ranexa, imdur  - c/w procardia xl  - appreciate cards recs CAD w/ multiple stents. s/p cath 9/14 - 2 stents LAD, 3 RCA w/ Impella assistance.   - continues to have intermittent chest pain, worse when overloaded  - c/w ASA, plavix, lipitor, BB  - c/w ranexa, imdur  - c/w procardia xl  - appreciate cards recs - Acute renal failure on CKD in setting of obstructed R kidney (L kidney atrophic).   - c/w HD, last yesterday  - R fem shiley > one week old - IR to replace today  - Urology will proceed with ureteral stent, awaiting urine clx and cardiac clearance  - mild hematuria, started with DAPTs, continue to monitor, urology following  - monitor daily K and BMP - Acute renal failure on CKD in setting of obstructed R kidney (L kidney atrophic).   - c/w HD, last yesterday  - R fem shiley > one week old - IR to replace today  - Urology will proceed with ureteral stent, awaiting urine clx and cardiac clearance  - mild hematuria, started with DAPTs, continue to monitor, urology following  - monitor daily K and BMP  - K 6.1 today - albuterol/insulin/d50/kayexalate as HD will be later today

## 2017-09-19 NOTE — CHART NOTE - NSCHARTNOTEFT_GEN_A_CORE
Spoke with patient and family members, brother, daughter extensively at bedside regarding refusal of temporizing measures for hyperkalemia as well as repeat potassium given that patient refused HD catheter and dialysis. Patient understands he is at risk for death given refusal of HD especially in the setting of his hyperkalemia. Eventually agreed after family convinced to take insulin, D50, and kayexelate. Also, after much convincing agreed to BMP. Patient aware of risk of death, stating that he is 72 years old. Patient expressing frustration with situation but still refusing HD catheter and dialysis despite family's attempt to convince him otherwise. Attempted to discuss DNR status with patient which he refused to discuss. Explained he may develop an arrhythmia and his heart may stop and resuscitation will be futile given his decision to refuse HD, which he continued to refuse. Will follow up repeat BMP and continue with temporizing measures for now. Plan discussed with attending, Dr. Orosco.

## 2017-09-19 NOTE — PROGRESS NOTE ADULT - SUBJECTIVE AND OBJECTIVE BOX
24H hour events: No acute events.     MEDICATIONS:  aspirin enteric coated 81 milliGRAM(s) Oral daily  tamsulosin 0.4 milliGRAM(s) Oral at bedtime  isosorbide   mononitrate ER Tablet (IMDUR) 30 milliGRAM(s) Oral daily  ranolazine 1000 milliGRAM(s) Oral two times a day  clopidogrel Tablet 75 milliGRAM(s) Oral daily  metoprolol succinate  milliGRAM(s) Oral daily  heparin  Injectable 5000 Unit(s) SubCutaneous every 8 hours  hydrALAZINE 10 milliGRAM(s) Oral three times a day    cefTRIAXone   IVPB 1 Gram(s) IV Intermittent every 24 hours  cefTRIAXone   IVPB        buDESOnide  80 MICROgram(s)/formoterol 4.5 MICROgram(s) Inhaler 2 Puff(s) Inhalation two times a day  ALBUTerol    90 MICROgram(s) HFA Inhaler 2 Puff(s) Inhalation every 6 hours PRN    busPIRone 5 milliGRAM(s) Oral two times a day  acetaminophen   Tablet. 650 milliGRAM(s) Oral every 6 hours PRN  oxyCODONE    IR 5 milliGRAM(s) Oral every 4 hours PRN      insulin lispro (HumaLOG) corrective regimen sliding scale   SubCutaneous three times a day before meals  insulin lispro (HumaLOG) corrective regimen sliding scale   SubCutaneous at bedtime  dextrose Gel 1 Dose(s) Oral once PRN  dextrose 50% Injectable 12.5 Gram(s) IV Push once  dextrose 50% Injectable 25 Gram(s) IV Push once  dextrose 50% Injectable 25 Gram(s) IV Push once  glucagon  Injectable 1 milliGRAM(s) IntraMuscular once PRN  atorvastatin 80 milliGRAM(s) Oral at bedtime    dextrose 5%. 1000 milliLiter(s) IV Continuous <Continuous>      REVIEW OF SYSTEMS:  Denies CP, SOB, orthopnea, pnd, palpitations. right flank pain improved.    PHYSICAL EXAM:  T(C): 36.8 (09-19-17 @ 12:07), Max: 36.8 (09-18-17 @ 20:18)  HR: 73 (09-19-17 @ 12:07) (72 - 83)  BP: 114/72 (09-19-17 @ 12:07) (102/68 - 144/79)  RR: 18 (09-19-17 @ 04:30) (18 - 18)  SpO2: 98% (09-19-17 @ 12:07) (96% - 100%)  Wt(kg): --  I&O's Summary    18 Sep 2017 07:01  -  19 Sep 2017 07:00  --------------------------------------------------------  IN: 830 mL / OUT: 2560 mL / NET: -1730 mL    19 Sep 2017 07:01  -  19 Sep 2017 14:40  --------------------------------------------------------  IN: 470 mL / OUT: 0 mL / NET: 470 mL    Appearance: Normal	  HEENT:   Normal oral mucosa, PERRL, EOMI	  Lymphatic: No lymphadenopathy  Cardiovascular: Normal S1 S2, No JVD, No murmurs, No edema  right femoral temp HD cath  left fem access small hematoma, nontender, distal pulses doplerable  Respiratory: Lungs clear to auscultation	  Psychiatry: A & O x 3, Mood & affect appropriate  Gastrointestinal:  Soft, Non-tender, + BS	  Skin: No rashes, No ecchymoses, No cyanosis	  Neurologic: Non-focal  Extremities: Normal range of motion, No clubbing, cyanosis or edema  Vascular: Peripheral pulses palpable 2+ bilaterally      LABS:	 	    CBC Full  -  ( 19 Sep 2017 07:32 )  WBC Count : 20.29 K/uL  Hemoglobin : 9.1 g/dL  Hematocrit : 27.6 %  Platelet Count - Automated : 393 K/uL  Mean Cell Volume : 81.2 fl  Mean Cell Hemoglobin : 26.8 pg  Mean Cell Hemoglobin Concentration : 33.0 gm/dL  Auto Neutrophil # : x  Auto Lymphocyte # : x  Auto Monocyte # : x  Auto Eosinophil # : x  Auto Basophil # : x  Auto Neutrophil % : x  Auto Lymphocyte % : x  Auto Monocyte % : x  Auto Eosinophil % : x  Auto Basophil % : x    09-19    130<L>  |  85<L>  |  76<H>  ----------------------------<  86  6.1<H>   |  17<L>  |  7.75<H>  09-18    131<L>  |  87<L>  |  68<H>  ----------------------------<  119<H>  5.9<H>   |  22  |  7.46<H>    Ca    9.8      19 Sep 2017 07:33  Ca    9.4      18 Sep 2017 23:53  Phos  9.8     09-19  Phos  10.0     09-18  Mg     2.7     09-19  Mg     2.6     09-18    TPro  7.6  /  Alb  3.2<L>  /  TBili  0.5  /  DBili  x   /  AST  28  /  ALT  21  /  AlkPhos  102  09-18    TELEMETRY: sinus 60-80      < from: TTE with Doppler (w/Cont) (09.13.17 @ 22:23) >  Dimensions:    Normal Values:  LA:     4.4    2.0 - 4.0 cm  Ao:            2.0 - 3.8 cm  SEPTUM: 1.2    0.6 - 1.2 cm  PWT:    1.0    0.6 - 1.1 cm  LVIDd:  5.2    3.0 - 5.6 cm  LVIDs:  4.2    1.8 - 4.0 cm  Derived variables:  LVMI: 113 g/m2  RWT: 0.38  Fractional short: 19 %  EF (Visual Estimate): 35 %  Doppler Peak Velocity (m/sec): AoV=1.9    < end of copied text >    	  ASSESSMENT/PLAN: 	    72 year old man w/ CAD with multiple stent, DM2, HTN, HLD, COPD, LIN on CKD on HD, initially presented with pyelonephritis, course c/b LIN on CKD, c/b NSTEMI s/p impella assisted BABAR x 5 (LAD and RCA) on 9/14/2017     1. NSTEMI - s/p PCI  -cont DAPT, cont statin 80mg daily  **please maintain on dual antiplatelets in light of recent coronary stenting unless life threatening bleed or high risk procedure  **anesthesia request preop eval - rcri score 3 (11% risk major cardiac event) patient is high risk; please cont periop beta blocker  -cont bblocker, ranexa  -lifevest    2. HFrEF -   -cont metoprolol succinate 150mg daily, imdur  -cont hydralazine 10mg tid; inc as tolerated  -eventual ACE/ARB when renal function stabilizes - likely outpatient  -continue fluid removal during HD  -monitor on tele    [id number]   24H hour events: No acute events.    MEDICATIONS:  aspirin enteric coated 81 milliGRAM(s) Oral daily  tamsulosin 0.4 milliGRAM(s) Oral at bedtime  isosorbide   mononitrate ER Tablet (IMDUR) 30 milliGRAM(s) Oral daily  ranolazine 1000 milliGRAM(s) Oral two times a day  clopidogrel Tablet 75 milliGRAM(s) Oral daily  NIFEdipine XL 60 milliGRAM(s) Oral daily  metoprolol succinate  milliGRAM(s) Oral daily  heparin  Injectable 5000 Unit(s) SubCutaneous every 8 hours    cefTRIAXone   IVPB 1 Gram(s) IV Intermittent every 24 hours  cefTRIAXone   IVPB        buDESOnide  80 MICROgram(s)/formoterol 4.5 MICROgram(s) Inhaler 2 Puff(s) Inhalation two times a day  ALBUTerol    90 MICROgram(s) HFA Inhaler 2 Puff(s) Inhalation every 6 hours PRN    busPIRone 5 milliGRAM(s) Oral two times a day  acetaminophen   Tablet. 650 milliGRAM(s) Oral every 6 hours PRN  oxyCODONE    IR 5 milliGRAM(s) Oral every 4 hours PRN      insulin lispro (HumaLOG) corrective regimen sliding scale   SubCutaneous three times a day before meals  insulin lispro (HumaLOG) corrective regimen sliding scale   SubCutaneous at bedtime  dextrose Gel 1 Dose(s) Oral once PRN  dextrose 50% Injectable 12.5 Gram(s) IV Push once  dextrose 50% Injectable 25 Gram(s) IV Push once  dextrose 50% Injectable 25 Gram(s) IV Push once  glucagon  Injectable 1 milliGRAM(s) IntraMuscular once PRN  atorvastatin 80 milliGRAM(s) Oral at bedtime    dextrose 5%. 1000 milliLiter(s) IV Continuous <Continuous>      REVIEW OF SYSTEMS:  Denies CP, SOB, orthopnea. Complains of R flank pain  Remaining 10point ROS negative.    PHYSICAL EXAM:  T(C): 36.7 (09-18-17 @ 04:15), Max: 36.9 (09-17-17 @ 17:20)  HR: 78 (09-18-17 @ 05:02) (72 - 78)  BP: 118/69 (09-18-17 @ 05:02) (105/65 - 133/70)  RR: 19 (09-18-17 @ 04:15) (18 - 22)  SpO2: 94% (09-18-17 @ 04:15) (92% - 100%)  Wt(kg): --  I&O's Summary    17 Sep 2017 07:01  -  18 Sep 2017 07:00  --------------------------------------------------------  IN: 560 mL / OUT: 1280 mL / NET: -720 mL    18 Sep 2017 07:01  -  18 Sep 2017 10:17  --------------------------------------------------------  IN: 220 mL / OUT: 200 mL / NET: 20 mL    Appearance: Normal	  HEENT:   Normal oral mucosa, PERRL, EOMI	  Lymphatic: No lymphadenopathy  Cardiovascular: Normal S1 S2, No JVD, No murmurs, No edema  left femoral temp HD cath  right fem access small hematoma, nontender, distal pulses doplerable  Respiratory: Lungs clear to auscultation	  Psychiatry: A & O x 3, Mood & affect appropriate  Gastrointestinal:  Soft, Non-tender, + BS	  Skin: No rashes, No ecchymoses, No cyanosis	  Neurologic: Non-focal  Extremities: Normal range of motion, No clubbing, cyanosis or edema  Vascular: Peripheral pulses palpable 2+ bilaterally    LABS:	 	    CBC Full  -  ( 18 Sep 2017 07:16 )  WBC Count : 13.69 K/uL  Hemoglobin : 8.8 g/dL  Hematocrit : 26.1 %  Platelet Count - Automated : 314 K/uL  Mean Cell Volume : 80.3 fl  Mean Cell Hemoglobin : 27.1 pg  Mean Cell Hemoglobin Concentration : 33.7 gm/dL  Auto Neutrophil # : x  Auto Lymphocyte # : x  Auto Monocyte # : x  Auto Eosinophil # : x  Auto Basophil # : x  Auto Neutrophil % : x  Auto Lymphocyte % : x  Auto Monocyte % : x  Auto Eosinophil % : x  Auto Basophil % : x    09-17    135  |  90<L>  |  48<H>  ----------------------------<  97  3.5   |  22  |  5.05<H>  09-17    134<L>  |  90<L>  |  72<H>  ----------------------------<  127<H>  5.0   |  20<L>  |  7.22<H>    Ca    9.3      17 Sep 2017 21:48  Ca    9.7      17 Sep 2017 20:32  Phos  10.9     09-17  Mg     2.7     09-17    TPro  7.4  /  Alb  3.4  /  TBili  0.5  /  DBili  x   /  AST  29  /  ALT  23  /  AlkPhos  106  09-17    TELEMETRY: sinus 70-80      < from: TTE with Doppler (w/Cont) (09.13.17 @ 22:23) >  Dimensions:    Normal Values:  LA:     4.4    2.0 - 4.0 cm  Ao:            2.0 - 3.8 cm  SEPTUM: 1.2    0.6 - 1.2 cm  PWT:    1.0    0.6 - 1.1 cm  LVIDd:  5.2    3.0 - 5.6 cm  LVIDs:  4.2    1.8 - 4.0 cm  Derived variables:  LVMI: 113 g/m2  RWT: 0.38  Fractional short: 19 %  EF (Visual Estimate): 35 %  Doppler Peak Velocity (m/sec): AoV=1.9    < end of copied text >  < from: TTE with Doppler (w/Cont) (09.13.17 @ 22:23) >  ------------------------------------------------------------------------  Conclusions:  1. Mild mitral annular calcification. Thickened and  tethered mitral valve leaflets with normal opening.  Mild-moderate mitral regurgitation.  2. Aortic valve not well visualized; appears calcified.  Peak transaortic valve gradient equals 14 mm Hg, mean  transaortic valve gradient equals 7 mm Hg, estimated aortic  valve area equals 1.4 sqcm (by continuity equation), aortic  valve velocity time integral equals 41 cm, consistent with  moderate aortic stenosis. No aortic valve regurgitation  seen.  3. Mild left ventricular enlargement. Eccentric left  ventricular hypertrophy (dilated left ventricle with normal  relative wall thickness).  4. Endocardial visualization enhanced with intravenous  injection of echo contrast (Definity). Moderate-severe  segmental left ventricular systolic dysfunction. The mid to  distal septum, mid to distal inferior, and the apical cap  are akinetic.  No left ventricular thrombus.  5. Normal right ventricular size and systolic function.  6. Thickened pericardium inferior to the right heart. Trace  pericardial effusion adjacent to the right heart. No  evidence of hemodynamic compromise.  *** No previous Echo exam.  ------------------------------------------------------------------------    < end of copied text >    < from: Cardiac Cath Lab - Adult (09.12.17 @ 18:38) >  PROCEDURE:  --  Left heart catheterization.  --  Left coronary angiography.  --  Right coronary angiography.    < end of copied text >  	    ASSESSMENT/PLAN: 	    72 year old man w/ CAD with multiple stent, DM2, HTN, HLD, COPD, LIN on CKD on HD, initially presented with pyelonephritis, course c/b LIN on CKD, c/b NSTEMI s/p impella assisted BABAR x 5 (LAD and RCA) on 9/14/2017       1. NSTEMI - s/p PCI  -cont DAPT, cont statin 80mg daily  **to whom it may concern, please maintain on dual antiplatelets in light of recent coronary stenting unless life threatening bleed or high risk procedure  **anesthesia request preop eval - rcri score 3 (11% risk major cardiac event) patient is high risk; please cont periop beta blocker  -cont bblocker, ranexa  -lifevest eval    2. HFrEF -   -cont metoprolol succinate 150mg daily, imdur  **please add hydralazine 10mg tid; inc to 25mg tid if tolerates **titrate up hydralazine as titrate off nifedipine setting HFrEF  -eventual ACE/ARB when renal function stabilizes - likely outpatient  -continue fluid removal during HD  -monitor on tele    [id number] cc: "I am better"    24H hour events: No acute events.     MEDICATIONS:  aspirin enteric coated 81 milliGRAM(s) Oral daily  tamsulosin 0.4 milliGRAM(s) Oral at bedtime  isosorbide   mononitrate ER Tablet (IMDUR) 30 milliGRAM(s) Oral daily  ranolazine 1000 milliGRAM(s) Oral two times a day  clopidogrel Tablet 75 milliGRAM(s) Oral daily  metoprolol succinate  milliGRAM(s) Oral daily  heparin  Injectable 5000 Unit(s) SubCutaneous every 8 hours  hydrALAZINE 10 milliGRAM(s) Oral three times a day    cefTRIAXone   IVPB 1 Gram(s) IV Intermittent every 24 hours  cefTRIAXone   IVPB        buDESOnide  80 MICROgram(s)/formoterol 4.5 MICROgram(s) Inhaler 2 Puff(s) Inhalation two times a day  ALBUTerol    90 MICROgram(s) HFA Inhaler 2 Puff(s) Inhalation every 6 hours PRN    busPIRone 5 milliGRAM(s) Oral two times a day  acetaminophen   Tablet. 650 milliGRAM(s) Oral every 6 hours PRN  oxyCODONE    IR 5 milliGRAM(s) Oral every 4 hours PRN      insulin lispro (HumaLOG) corrective regimen sliding scale   SubCutaneous three times a day before meals  insulin lispro (HumaLOG) corrective regimen sliding scale   SubCutaneous at bedtime  dextrose Gel 1 Dose(s) Oral once PRN  dextrose 50% Injectable 12.5 Gram(s) IV Push once  dextrose 50% Injectable 25 Gram(s) IV Push once  dextrose 50% Injectable 25 Gram(s) IV Push once  glucagon  Injectable 1 milliGRAM(s) IntraMuscular once PRN  atorvastatin 80 milliGRAM(s) Oral at bedtime    dextrose 5%. 1000 milliLiter(s) IV Continuous <Continuous>      REVIEW OF SYSTEMS:  Denies CP, SOB, orthopnea, pnd, palpitations. right flank pain improved.    PHYSICAL EXAM:  T(C): 36.8 (09-19-17 @ 12:07), Max: 36.8 (09-18-17 @ 20:18)  HR: 73 (09-19-17 @ 12:07) (72 - 83)  BP: 114/72 (09-19-17 @ 12:07) (102/68 - 144/79)  RR: 18 (09-19-17 @ 04:30) (18 - 18)  SpO2: 98% (09-19-17 @ 12:07) (96% - 100%)  Wt(kg): --  I&O's Summary    18 Sep 2017 07:01  -  19 Sep 2017 07:00  --------------------------------------------------------  IN: 830 mL / OUT: 2560 mL / NET: -1730 mL    19 Sep 2017 07:01  -  19 Sep 2017 14:40  --------------------------------------------------------  IN: 470 mL / OUT: 0 mL / NET: 470 mL    Appearance: Normal	  HEENT:   Normal oral mucosa, PERRL, EOMI	  Lymphatic: No lymphadenopathy  Cardiovascular: Normal S1 S2, No JVD, No murmurs, No edema  right femoral temp HD cath  left fem access small hematoma, nontender, distal pulses doplerable  Respiratory: Lungs clear to auscultation	  Psychiatry: A & O x 3, Mood & affect appropriate  Gastrointestinal:  Soft, Non-tender, + BS	  Skin: No rashes, No ecchymoses, No cyanosis	  Neurologic: Non-focal  Extremities: Normal range of motion, No clubbing, cyanosis or edema  Vascular: Peripheral pulses palpable 2+ bilaterally      LABS:	 	    CBC Full  -  ( 19 Sep 2017 07:32 )  WBC Count : 20.29 K/uL  Hemoglobin : 9.1 g/dL  Hematocrit : 27.6 %  Platelet Count - Automated : 393 K/uL  Mean Cell Volume : 81.2 fl  Mean Cell Hemoglobin : 26.8 pg  Mean Cell Hemoglobin Concentration : 33.0 gm/dL  Auto Neutrophil # : x  Auto Lymphocyte # : x  Auto Monocyte # : x  Auto Eosinophil # : x  Auto Basophil # : x  Auto Neutrophil % : x  Auto Lymphocyte % : x  Auto Monocyte % : x  Auto Eosinophil % : x  Auto Basophil % : x    09-19    130<L>  |  85<L>  |  76<H>  ----------------------------<  86  6.1<H>   |  17<L>  |  7.75<H>  09-18    131<L>  |  87<L>  |  68<H>  ----------------------------<  119<H>  5.9<H>   |  22  |  7.46<H>    Ca    9.8      19 Sep 2017 07:33  Ca    9.4      18 Sep 2017 23:53  Phos  9.8     09-19  Phos  10.0     09-18  Mg     2.7     09-19  Mg     2.6     09-18    TPro  7.6  /  Alb  3.2<L>  /  TBili  0.5  /  DBili  x   /  AST  28  /  ALT  21  /  AlkPhos  102  09-18    TELEMETRY: sinus 60-80      < from: TTE with Doppler (w/Cont) (09.13.17 @ 22:23) >  Dimensions:    Normal Values:  LA:     4.4    2.0 - 4.0 cm  Ao:            2.0 - 3.8 cm  SEPTUM: 1.2    0.6 - 1.2 cm  PWT:    1.0    0.6 - 1.1 cm  LVIDd:  5.2    3.0 - 5.6 cm  LVIDs:  4.2    1.8 - 4.0 cm  Derived variables:  LVMI: 113 g/m2  RWT: 0.38  Fractional short: 19 %  EF (Visual Estimate): 35 %  Doppler Peak Velocity (m/sec): AoV=1.9    < end of copied text >    	  ASSESSMENT/PLAN: 	    72 year old man w/ CAD with multiple stent, DM2, HTN, HLD, COPD, LIN on CKD on HD, initially presented with pyelonephritis, course c/b LIN on CKD, c/b NSTEMI s/p impella assisted BABAR x 5 (LAD and RCA) on 9/14/2017     1. NSTEMI - s/p PCI  -cont DAPT, cont statin 80mg daily  **please maintain on dual antiplatelets in light of recent coronary stenting unless life threatening bleed or high risk procedure  **anesthesia request preop eval - rcri score 3 (11% risk major cardiac event) patient is high risk; please cont periop beta blocker  -cont bblocker, ranexa  -lifevest    2. HFrEF -   -cont metoprolol succinate 150mg daily, imdur  -cont hydralazine 10mg tid; inc as tolerated  -eventual ACE/ARB when renal function stabilizes - likely outpatient  -continue fluid removal during HD  -monitor on tele    [id number]   24H hour events: No acute events.    MEDICATIONS:  aspirin enteric coated 81 milliGRAM(s) Oral daily  tamsulosin 0.4 milliGRAM(s) Oral at bedtime  isosorbide   mononitrate ER Tablet (IMDUR) 30 milliGRAM(s) Oral daily  ranolazine 1000 milliGRAM(s) Oral two times a day  clopidogrel Tablet 75 milliGRAM(s) Oral daily  NIFEdipine XL 60 milliGRAM(s) Oral daily  metoprolol succinate  milliGRAM(s) Oral daily  heparin  Injectable 5000 Unit(s) SubCutaneous every 8 hours    cefTRIAXone   IVPB 1 Gram(s) IV Intermittent every 24 hours  cefTRIAXone   IVPB        buDESOnide  80 MICROgram(s)/formoterol 4.5 MICROgram(s) Inhaler 2 Puff(s) Inhalation two times a day  ALBUTerol    90 MICROgram(s) HFA Inhaler 2 Puff(s) Inhalation every 6 hours PRN    busPIRone 5 milliGRAM(s) Oral two times a day  acetaminophen   Tablet. 650 milliGRAM(s) Oral every 6 hours PRN  oxyCODONE    IR 5 milliGRAM(s) Oral every 4 hours PRN      insulin lispro (HumaLOG) corrective regimen sliding scale   SubCutaneous three times a day before meals  insulin lispro (HumaLOG) corrective regimen sliding scale   SubCutaneous at bedtime  dextrose Gel 1 Dose(s) Oral once PRN  dextrose 50% Injectable 12.5 Gram(s) IV Push once  dextrose 50% Injectable 25 Gram(s) IV Push once  dextrose 50% Injectable 25 Gram(s) IV Push once  glucagon  Injectable 1 milliGRAM(s) IntraMuscular once PRN  atorvastatin 80 milliGRAM(s) Oral at bedtime    dextrose 5%. 1000 milliLiter(s) IV Continuous <Continuous>      REVIEW OF SYSTEMS:  Denies CP, SOB, orthopnea. Complains of R flank pain  Remaining 10point ROS negative.    PHYSICAL EXAM:  T(C): 36.7 (09-18-17 @ 04:15), Max: 36.9 (09-17-17 @ 17:20)  HR: 78 (09-18-17 @ 05:02) (72 - 78)  BP: 118/69 (09-18-17 @ 05:02) (105/65 - 133/70)  RR: 19 (09-18-17 @ 04:15) (18 - 22)  SpO2: 94% (09-18-17 @ 04:15) (92% - 100%)  Wt(kg): --  I&O's Summary    17 Sep 2017 07:01  -  18 Sep 2017 07:00  --------------------------------------------------------  IN: 560 mL / OUT: 1280 mL / NET: -720 mL    18 Sep 2017 07:01  -  18 Sep 2017 10:17  --------------------------------------------------------  IN: 220 mL / OUT: 200 mL / NET: 20 mL    Appearance: Normal	  HEENT:   Normal oral mucosa, PERRL, EOMI	  Lymphatic: No lymphadenopathy  Cardiovascular: Normal S1 S2, No JVD, No murmurs, No edema  left femoral temp HD cath  right fem access small hematoma, nontender, distal pulses doplerable  Respiratory: Lungs clear to auscultation	  Psychiatry: A & O x 3, Mood & affect appropriate  Gastrointestinal:  Soft, Non-tender, + BS	  Skin: No rashes, No ecchymoses, No cyanosis	  Neurologic: Non-focal  Extremities: Normal range of motion, No clubbing, cyanosis or edema  Vascular: Peripheral pulses palpable 2+ bilaterally    LABS:	 	    CBC Full  -  ( 18 Sep 2017 07:16 )  WBC Count : 13.69 K/uL  Hemoglobin : 8.8 g/dL  Hematocrit : 26.1 %  Platelet Count - Automated : 314 K/uL  Mean Cell Volume : 80.3 fl  Mean Cell Hemoglobin : 27.1 pg  Mean Cell Hemoglobin Concentration : 33.7 gm/dL  Auto Neutrophil # : x  Auto Lymphocyte # : x  Auto Monocyte # : x  Auto Eosinophil # : x  Auto Basophil # : x  Auto Neutrophil % : x  Auto Lymphocyte % : x  Auto Monocyte % : x  Auto Eosinophil % : x  Auto Basophil % : x    09-17    135  |  90<L>  |  48<H>  ----------------------------<  97  3.5   |  22  |  5.05<H>  09-17    134<L>  |  90<L>  |  72<H>  ----------------------------<  127<H>  5.0   |  20<L>  |  7.22<H>    Ca    9.3      17 Sep 2017 21:48  Ca    9.7      17 Sep 2017 20:32  Phos  10.9     09-17  Mg     2.7     09-17    TPro  7.4  /  Alb  3.4  /  TBili  0.5  /  DBili  x   /  AST  29  /  ALT  23  /  AlkPhos  106  09-17    TELEMETRY: sinus 70-80      < from: TTE with Doppler (w/Cont) (09.13.17 @ 22:23) >  Dimensions:    Normal Values:  LA:     4.4    2.0 - 4.0 cm  Ao:            2.0 - 3.8 cm  SEPTUM: 1.2    0.6 - 1.2 cm  PWT:    1.0    0.6 - 1.1 cm  LVIDd:  5.2    3.0 - 5.6 cm  LVIDs:  4.2    1.8 - 4.0 cm  Derived variables:  LVMI: 113 g/m2  RWT: 0.38  Fractional short: 19 %  EF (Visual Estimate): 35 %  Doppler Peak Velocity (m/sec): AoV=1.9    < end of copied text >  < from: TTE with Doppler (w/Cont) (09.13.17 @ 22:23) >  ------------------------------------------------------------------------  Conclusions:  1. Mild mitral annular calcification. Thickened and  tethered mitral valve leaflets with normal opening.  Mild-moderate mitral regurgitation.  2. Aortic valve not well visualized; appears calcified.  Peak transaortic valve gradient equals 14 mm Hg, mean  transaortic valve gradient equals 7 mm Hg, estimated aortic  valve area equals 1.4 sqcm (by continuity equation), aortic  valve velocity time integral equals 41 cm, consistent with  moderate aortic stenosis. No aortic valve regurgitation  seen.  3. Mild left ventricular enlargement. Eccentric left  ventricular hypertrophy (dilated left ventricle with normal  relative wall thickness).  4. Endocardial visualization enhanced with intravenous  injection of echo contrast (Definity). Moderate-severe  segmental left ventricular systolic dysfunction. The mid to  distal septum, mid to distal inferior, and the apical cap  are akinetic.  No left ventricular thrombus.  5. Normal right ventricular size and systolic function.  6. Thickened pericardium inferior to the right heart. Trace  pericardial effusion adjacent to the right heart. No  evidence of hemodynamic compromise.  *** No previous Echo exam.  ------------------------------------------------------------------------    < end of copied text >    < from: Cardiac Cath Lab - Adult (09.12.17 @ 18:38) >  PROCEDURE:  --  Left heart catheterization.  --  Left coronary angiography.  --  Right coronary angiography.    < end of copied text >  	    ASSESSMENT/PLAN: 	    72 year old man w/ CAD with multiple stent, DM2, HTN, HLD, COPD, LIN on CKD on HD, initially presented with pyelonephritis, course c/b LIN on CKD, c/b NSTEMI s/p impella assisted BABAR x 5 (LAD and RCA) on 9/14/2017       1. NSTEMI - s/p PCI  -cont DAPT, cont statin 80mg daily  **to whom it may concern, please maintain on dual antiplatelets in light of recent coronary stenting unless life threatening bleed or high risk procedure  **anesthesia request preop eval - rcri score 3 (11% risk major cardiac event) patient is high risk; please cont periop beta blocker  -cont bblocker, ranexa  -lifevest eval    2. HFrEF -   -cont metoprolol succinate 150mg daily, imdur  **please add hydralazine 10mg tid; inc to 25mg tid if tolerates **titrate up hydralazine as titrate off nifedipine setting HFrEF  -eventual ACE/ARB when renal function stabilizes - likely outpatient  -continue fluid removal during HD  -monitor on tele    [id number]

## 2017-09-19 NOTE — PROGRESS NOTE ADULT - PROBLEM SELECTOR PROBLEM 1
Coronary artery disease of native artery of native heart with stable angina pectoris Acute kidney injury superimposed on CKD

## 2017-09-19 NOTE — PROGRESS NOTE ADULT - PROBLEM SELECTOR PLAN 5
Patient with h/o HTN  - c/w procardia, imdur, metoprolol. Start ACE-I when deemed ESRD. Patient w/ hematuria as well as large ecchymoses in L groin, no signs of hematoma.   - steadily downtrending - if continues to downtrend, will consider repeat imaging to reassess hematoma from impella  - not requiring transfusion  - daily CBC Patient w/ hematuria as well as large ecchymoses in L groin, no signs of hematoma.   - stable  - not requiring transfusion  - daily CBC

## 2017-09-19 NOTE — PROGRESS NOTE ADULT - PROBLEM SELECTOR PLAN 2
Volume status remains an issue (new HD and CHF), today volume status improved following HD yesterday.   - Echo showing EF 35%. Mild-moderate MR, moderate AS, mild LV enlargement, eccentric LV hypertrophy (dilated LV w/ nl relative wall thickness). Mod-severe segmental LV sys dysfunction. The mid to distal septum, mid to distal inferior, apical cap akinetic. Nl RV size & sys fxn  - c/w metoprolol succ 150 - will titrate w/ BPs  - c/w ASA, plavix, lipitor  - c/w ranexa, imdur  - c/w procardia xl  - plan to start ACE-I once patient is deemed ESRD and HD dependant Volume status remains an issue (new HD & CHF)  - Echo: EF 35%. Mild-moderate MR, mod AS, mild LV enlargement, eccentric LV hypertrophy (dilated LV w/ nl relative wall thickness). Mod-severe segmental LV sys dysfunction. The mid to distal septum, mid to distal inferior, apical cap akinetic. Nl RV size & sys fxn  - c/w metoprolol succ 150 - will titrate w/ BPs  - c/w ASA, plavix, lipitor  - c/w ranexa, imdur  - c/w procardia xl  - plan to start ACE-I once patient is deemed ESRD and HD dependant - Patient started on ceftriaxone ~9/6 for pyelonephritis. Day 14 today.  - c/w ceftriaxone  - ID following  - given increased WBC today, pending UClx, BClx results - Patient started on ceftriaxone ~9/6 for pyelonephritis. Day 14 today.  - c/w ceftriaxone  - ID following  - given increased WBC today, pending UClx, BClx results  - repeat US renal given increasing WBC

## 2017-09-19 NOTE — PROVIDER CONTACT NOTE (FALL NOTIFICATION) - ASSESSMENT
Upon assessment, patient was OOB, ambulating around bed with alarm going off and unsteady gait.  Patient was assisted to floor.

## 2017-09-19 NOTE — CHART NOTE - NSCHARTNOTEFT_GEN_A_CORE
Called by RENNY ANNE patient refusing HD cath replacement. Had extensive conversation using [employer] . Spent upwards of 20 minutes explaining thoroughly to the patient the risk of not getting the HD cathether as patient requires HD. Explained that the risk of not getting HD includes the risk of death especially in the setting of his known hyperkalemia, patient still refused. Patient AAOX3, aware of risk of death, still refused catheter. Procedure cancelled. Attending notified.    Christi White, R2  [phone]

## 2017-09-19 NOTE — PROVIDER CONTACT NOTE (FALL NOTIFICATION) - BACKGROUND
Patient admitted for STEMI. PMH of CVA, COPD, BPH, Bipolar affective disorder, CKD, Pancreatitis, HTN, Dyslipidemia, Diabetes mellitus, CAD.

## 2017-09-19 NOTE — PROGRESS NOTE ADULT - SUBJECTIVE AND OBJECTIVE BOX
Patient is a 72y old  Male who presents with a chief complaint of s/p cardiac cath (12 Sep 2017 19:27)      SUBJECTIVE / OVERNIGHT EVENTS: No acute events overnight. Patient denies F/C, HA, CP, SOB, abdominal pain, N/V/D/C.     MEDICATIONS  (STANDING):  insulin lispro (HumaLOG) corrective regimen sliding scale   SubCutaneous three times a day before meals  insulin lispro (HumaLOG) corrective regimen sliding scale   SubCutaneous at bedtime  dextrose 5%. 1000 milliLiter(s) (50 mL/Hr) IV Continuous <Continuous>  dextrose 50% Injectable 12.5 Gram(s) IV Push once  dextrose 50% Injectable 25 Gram(s) IV Push once  dextrose 50% Injectable 25 Gram(s) IV Push once  aspirin enteric coated 81 milliGRAM(s) Oral daily  tamsulosin 0.4 milliGRAM(s) Oral at bedtime  isosorbide   mononitrate ER Tablet (IMDUR) 30 milliGRAM(s) Oral daily  ranolazine 1000 milliGRAM(s) Oral two times a day  clopidogrel Tablet 75 milliGRAM(s) Oral daily  cefTRIAXone   IVPB 1 Gram(s) IV Intermittent every 24 hours  buDESOnide  80 MICROgram(s)/formoterol 4.5 MICROgram(s) Inhaler 2 Puff(s) Inhalation two times a day  sevelamer hydrochloride 800 milliGRAM(s) Oral three times a day with meals  busPIRone 5 milliGRAM(s) Oral two times a day  cefTRIAXone   IVPB      atorvastatin 80 milliGRAM(s) Oral at bedtime  metoprolol succinate  milliGRAM(s) Oral daily  heparin  Injectable 5000 Unit(s) SubCutaneous every 8 hours  hydrALAZINE 10 milliGRAM(s) Oral three times a day    MEDICATIONS  (PRN):  dextrose Gel 1 Dose(s) Oral once PRN Blood Glucose LESS THAN 70 milliGRAM(s)/deciliter  glucagon  Injectable 1 milliGRAM(s) IntraMuscular once PRN Glucose LESS THAN 70 milligrams/deciliter  ALBUTerol    90 MICROgram(s) HFA Inhaler 2 Puff(s) Inhalation every 6 hours PRN Shortness of Breath  acetaminophen   Tablet. 650 milliGRAM(s) Oral every 6 hours PRN pain  oxyCODONE    IR 5 milliGRAM(s) Oral every 4 hours PRN Severe Pain (7 - 10)      T(F): 98.3 (09-19 @ 04:30), Max: 98.3 (09-18 @ 20:18)  HR: 83 (09-19 @ 04:30) (72 - 83)  BP: 121/77 (09-19 @ 04:30) (102/68 - 144/79)  RR: 18 (09-19 @ 04:30) (18 - 18)  SpO2: 98% (09-19 @ 04:30) (91% - 100%)    CAPILLARY BLOOD GLUCOSE  122 (19 Sep 2017 03:34)  129 (18 Sep 2017 21:17)  138 (18 Sep 2017 16:37)  216 (18 Sep 2017 11:27)          I&O's Summary    18 Sep 2017 07:01  -  19 Sep 2017 07:00  --------------------------------------------------------  IN: 680 mL / OUT: 2560 mL / NET: -1880 mL        PHYSICAL EXAM:  GEN: Appears in no acute distress  HEENT: PERRLA, EOMI and accommodate, neck supple, no lymphadenopathy, no JVD  MOUTH: moist mucous membranes, no exudates or lesions   PULM: Clear to auscultation bilaterally, no wheezes, rales, rhonchi  CV: Regular rate and rhythm, S1S2, no murmurs, rubs or gallops  ABD: Soft, nontender to palpation, non-distended, normoactive bowel sounds  EXTREMITIES: No edema, + peripheral pulses  NEURO: AAOx3, moving all four extremities spontaneously  SKIN: No rashes, lesions, hematomas, ecchymosis    LABS & IMAGING:  Labs personally reviewed [X]                        8.8    13.69 )-----------( 314      ( 18 Sep 2017 07:16 )             26.1     Hgb Trend: 8.8<--, 9.2<--, 9.4<--, 10.4<--, 11.4<--    09-18    131<L>  |  87<L>  |  68<H>  ----------------------------<  119<H>  5.9<H>   |  22  |  7.46<H>    Ca    9.4      18 Sep 2017 23:53  Phos  10.0     09-18  Mg     2.6     09-18    TPro  7.6  /  Alb  3.2<L>  /  TBili  0.5  /  DBili  x   /  AST  28  /  ALT  21  /  AlkPhos  102  09-18    Creatinine Trend: 7.46<--, 8.75<--, 5.05<--, 7.22<--, 9.70<--, 9.55<--          Imaging personally reviewed [ ] Patient is a 72y old  Male who presents with a chief complaint of s/p cardiac cath (12 Sep 2017 19:27)      SUBJECTIVE / OVERNIGHT EVENTS: Repeat BMP after dialysis showed K 5.9. No changed on EKG, asymptomatic. Patient given insulin/d50/albuterol, but refused kayexalate. Patient has been drinking coconut water given by family, and was counselled on avoiding K containing foods. This morning, patient feeling well. Continues to have R flank pain. Denies CP, SOB, abdominal pain.     MEDICATIONS  (STANDING):  insulin lispro (HumaLOG) corrective regimen sliding scale   SubCutaneous three times a day before meals  insulin lispro (HumaLOG) corrective regimen sliding scale   SubCutaneous at bedtime  dextrose 5%. 1000 milliLiter(s) (50 mL/Hr) IV Continuous <Continuous>  dextrose 50% Injectable 12.5 Gram(s) IV Push once  dextrose 50% Injectable 25 Gram(s) IV Push once  dextrose 50% Injectable 25 Gram(s) IV Push once  aspirin enteric coated 81 milliGRAM(s) Oral daily  tamsulosin 0.4 milliGRAM(s) Oral at bedtime  isosorbide   mononitrate ER Tablet (IMDUR) 30 milliGRAM(s) Oral daily  ranolazine 1000 milliGRAM(s) Oral two times a day  clopidogrel Tablet 75 milliGRAM(s) Oral daily  cefTRIAXone   IVPB 1 Gram(s) IV Intermittent every 24 hours  buDESOnide  80 MICROgram(s)/formoterol 4.5 MICROgram(s) Inhaler 2 Puff(s) Inhalation two times a day  sevelamer hydrochloride 800 milliGRAM(s) Oral three times a day with meals  busPIRone 5 milliGRAM(s) Oral two times a day  cefTRIAXone   IVPB      atorvastatin 80 milliGRAM(s) Oral at bedtime  metoprolol succinate  milliGRAM(s) Oral daily  heparin  Injectable 5000 Unit(s) SubCutaneous every 8 hours  hydrALAZINE 10 milliGRAM(s) Oral three times a day    MEDICATIONS  (PRN):  dextrose Gel 1 Dose(s) Oral once PRN Blood Glucose LESS THAN 70 milliGRAM(s)/deciliter  glucagon  Injectable 1 milliGRAM(s) IntraMuscular once PRN Glucose LESS THAN 70 milligrams/deciliter  ALBUTerol    90 MICROgram(s) HFA Inhaler 2 Puff(s) Inhalation every 6 hours PRN Shortness of Breath  acetaminophen   Tablet. 650 milliGRAM(s) Oral every 6 hours PRN pain  oxyCODONE    IR 5 milliGRAM(s) Oral every 4 hours PRN Severe Pain (7 - 10)      T(F): 98.3 (09-19 @ 04:30), Max: 98.3 (09-18 @ 20:18)  HR: 83 (09-19 @ 04:30) (72 - 83)  BP: 121/77 (09-19 @ 04:30) (102/68 - 144/79)  RR: 18 (09-19 @ 04:30) (18 - 18)  SpO2: 98% (09-19 @ 04:30) (91% - 100%)    CAPILLARY BLOOD GLUCOSE  122 (19 Sep 2017 03:34)  129 (18 Sep 2017 21:17)  138 (18 Sep 2017 16:37)  216 (18 Sep 2017 11:27)    I&O's Summary    18 Sep 2017 07:01  -  19 Sep 2017 07:00  --------------------------------------------------------  IN: 680 mL / OUT: 2560 mL / NET: -1880 mL        PHYSICAL EXAM:  GEN: Appears in no acute distress  HEENT: PERRLA, EOMI and accommodate, neck supple, no lymphadenopathy, no JVD  MOUTH: moist mucous membranes, no exudates or lesions   PULM: bilateral crackles. Pt on NC at time of exam  CV: distant heart sounds, Regular rate and rhythm, S1S2, no murmurs, rubs or gallops  ABD: Soft, nontender to palpation, non-distended, normoactive bowel sounds  EXTREMITIES: No edema, +1 peripheral pulses  NEURO: AAOx3, moving all four extremities spontaneously  TUBES: L peripheral IV, james draining dark urine (casts? blood?) in it low volume, shiley R femoral      LABS & IMAGING:  Labs personally reviewed [X]                        8.8    13.69 )-----------( 314      ( 18 Sep 2017 07:16 )             26.1     Hgb Trend: 8.8<--, 9.2<--, 9.4<--, 10.4<--, 11.4<--    09-18    131<L>  |  87<L>  |  68<H>  ----------------------------<  119<H>  5.9<H>   |  22  |  7.46<H>    Ca    9.4      18 Sep 2017 23:53  Phos  10.0     09-18  Mg     2.6     09-18    TPro  7.6  /  Alb  3.2<L>  /  TBili  0.5  /  DBili  x   /  AST  28  /  ALT  21  /  AlkPhos  102  09-18    Creatinine Trend: 7.46<--, 8.75<--, 5.05<--, 7.22<--, 9.70<--, 9.55<--          Imaging personally reviewed [ ] Patient is a 72y old  Male who presents with a chief complaint of s/p cardiac cath (12 Sep 2017 19:27)      SUBJECTIVE / OVERNIGHT EVENTS: Repeat BMP after dialysis showed K 5.9. No changed on EKG, asymptomatic. Patient given insulin/d50/albuterol, but refused kayexalate. Patient has been drinking coconut water given by family, and was counselled on avoiding K containing foods. This morning, patient feeling well. Continues to have R flank pain. Denies CP, SOB, abdominal pain.     MEDICATIONS  (STANDING):  insulin lispro (HumaLOG) corrective regimen sliding scale   SubCutaneous three times a day before meals  insulin lispro (HumaLOG) corrective regimen sliding scale   SubCutaneous at bedtime  dextrose 5%. 1000 milliLiter(s) (50 mL/Hr) IV Continuous <Continuous>  dextrose 50% Injectable 12.5 Gram(s) IV Push once  dextrose 50% Injectable 25 Gram(s) IV Push once  dextrose 50% Injectable 25 Gram(s) IV Push once  aspirin enteric coated 81 milliGRAM(s) Oral daily  tamsulosin 0.4 milliGRAM(s) Oral at bedtime  isosorbide   mononitrate ER Tablet (IMDUR) 30 milliGRAM(s) Oral daily  ranolazine 1000 milliGRAM(s) Oral two times a day  clopidogrel Tablet 75 milliGRAM(s) Oral daily  cefTRIAXone   IVPB 1 Gram(s) IV Intermittent every 24 hours  buDESOnide  80 MICROgram(s)/formoterol 4.5 MICROgram(s) Inhaler 2 Puff(s) Inhalation two times a day  sevelamer hydrochloride 800 milliGRAM(s) Oral three times a day with meals  busPIRone 5 milliGRAM(s) Oral two times a day  cefTRIAXone   IVPB      atorvastatin 80 milliGRAM(s) Oral at bedtime  metoprolol succinate  milliGRAM(s) Oral daily  heparin  Injectable 5000 Unit(s) SubCutaneous every 8 hours  hydrALAZINE 10 milliGRAM(s) Oral three times a day    MEDICATIONS  (PRN):  dextrose Gel 1 Dose(s) Oral once PRN Blood Glucose LESS THAN 70 milliGRAM(s)/deciliter  glucagon  Injectable 1 milliGRAM(s) IntraMuscular once PRN Glucose LESS THAN 70 milligrams/deciliter  ALBUTerol    90 MICROgram(s) HFA Inhaler 2 Puff(s) Inhalation every 6 hours PRN Shortness of Breath  acetaminophen   Tablet. 650 milliGRAM(s) Oral every 6 hours PRN pain  oxyCODONE    IR 5 milliGRAM(s) Oral every 4 hours PRN Severe Pain (7 - 10)      T(F): 98.3 (09-19 @ 04:30), Max: 98.3 (09-18 @ 20:18)  HR: 83 (09-19 @ 04:30) (72 - 83)  BP: 121/77 (09-19 @ 04:30) (102/68 - 144/79)  RR: 18 (09-19 @ 04:30) (18 - 18)  SpO2: 98% (09-19 @ 04:30) (91% - 100%)    CAPILLARY BLOOD GLUCOSE  122 (19 Sep 2017 03:34)  129 (18 Sep 2017 21:17)  138 (18 Sep 2017 16:37)  216 (18 Sep 2017 11:27)    I&O's Summary    18 Sep 2017 07:01  -  19 Sep 2017 07:00  --------------------------------------------------------  IN: 680 mL / OUT: 2560 mL / NET: -1880 mL        PHYSICAL EXAM:  GEN: Appears in no acute distress  HEENT: PERRLA, EOMI and accommodate, neck supple, no lymphadenopathy, no JVD  MOUTH: moist mucous membranes, no exudates or lesions   PULM: bilateral crackles. Pt on NC at time of exam  CV: distant heart sounds, Regular rate and rhythm, S1S2, no murmurs, rubs or gallops  ABD: Soft, nontender to palpation, non-distended, normoactive bowel sounds  EXTREMITIES: No edema, +1 peripheral pulses, large ecchymosis on L side from impella  NEURO: AAOx3, moving all four extremities spontaneously  TUBES: L peripheral IV, james draining dark urine (casts? blood?) in it low volume, shiley R femoral      LABS & IMAGING:  Labs personally reviewed [X]                        8.8    13.69 )-----------( 314      ( 18 Sep 2017 07:16 )             26.1     Hgb Trend: 8.8<--, 9.2<--, 9.4<--, 10.4<--, 11.4<--    09-18    131<L>  |  87<L>  |  68<H>  ----------------------------<  119<H>  5.9<H>   |  22  |  7.46<H>    Ca    9.4      18 Sep 2017 23:53  Phos  10.0     09-18  Mg     2.6     09-18    TPro  7.6  /  Alb  3.2<L>  /  TBili  0.5  /  DBili  x   /  AST  28  /  ALT  21  /  AlkPhos  102  09-18    Creatinine Trend: 7.46<--, 8.75<--, 5.05<--, 7.22<--, 9.70<--, 9.55<--          Imaging personally reviewed [ ] Patient is a 72y old  Male who presents with a chief complaint of s/p cardiac cath (12 Sep 2017 19:27)      SUBJECTIVE / OVERNIGHT EVENTS: Repeat BMP after dialysis showed K 5.9. No changed on EKG, asymptomatic. Patient given insulin/d50/albuterol, but refused kayexalate. Patient has been drinking coconut water given by family, and was counselled on avoiding K containing foods. This morning, patient feeling well. Continues to have R flank pain. Denies CP, SOB, abdominal pain.     MEDICATIONS  (STANDING):  insulin lispro (HumaLOG) corrective regimen sliding scale   SubCutaneous three times a day before meals  insulin lispro (HumaLOG) corrective regimen sliding scale   SubCutaneous at bedtime  dextrose 5%. 1000 milliLiter(s) (50 mL/Hr) IV Continuous <Continuous>  dextrose 50% Injectable 12.5 Gram(s) IV Push once  dextrose 50% Injectable 25 Gram(s) IV Push once  dextrose 50% Injectable 25 Gram(s) IV Push once  aspirin enteric coated 81 milliGRAM(s) Oral daily  tamsulosin 0.4 milliGRAM(s) Oral at bedtime  isosorbide   mononitrate ER Tablet (IMDUR) 30 milliGRAM(s) Oral daily  ranolazine 1000 milliGRAM(s) Oral two times a day  clopidogrel Tablet 75 milliGRAM(s) Oral daily  cefTRIAXone   IVPB 1 Gram(s) IV Intermittent every 24 hours  buDESOnide  80 MICROgram(s)/formoterol 4.5 MICROgram(s) Inhaler 2 Puff(s) Inhalation two times a day  sevelamer hydrochloride 800 milliGRAM(s) Oral three times a day with meals  busPIRone 5 milliGRAM(s) Oral two times a day  cefTRIAXone   IVPB      atorvastatin 80 milliGRAM(s) Oral at bedtime  metoprolol succinate  milliGRAM(s) Oral daily  heparin  Injectable 5000 Unit(s) SubCutaneous every 8 hours  hydrALAZINE 10 milliGRAM(s) Oral three times a day    MEDICATIONS  (PRN):  dextrose Gel 1 Dose(s) Oral once PRN Blood Glucose LESS THAN 70 milliGRAM(s)/deciliter  glucagon  Injectable 1 milliGRAM(s) IntraMuscular once PRN Glucose LESS THAN 70 milligrams/deciliter  ALBUTerol    90 MICROgram(s) HFA Inhaler 2 Puff(s) Inhalation every 6 hours PRN Shortness of Breath  acetaminophen   Tablet. 650 milliGRAM(s) Oral every 6 hours PRN pain  oxyCODONE    IR 5 milliGRAM(s) Oral every 4 hours PRN Severe Pain (7 - 10)      T(F): 98.3 (09-19 @ 04:30), Max: 98.3 (09-18 @ 20:18)  HR: 83 (09-19 @ 04:30) (72 - 83)  BP: 121/77 (09-19 @ 04:30) (102/68 - 144/79)  RR: 18 (09-19 @ 04:30) (18 - 18)  SpO2: 98% (09-19 @ 04:30) (91% - 100%)    CAPILLARY BLOOD GLUCOSE  122 (19 Sep 2017 03:34)  129 (18 Sep 2017 21:17)  138 (18 Sep 2017 16:37)  216 (18 Sep 2017 11:27)    I&O's Summary    18 Sep 2017 07:01  -  19 Sep 2017 07:00  --------------------------------------------------------  IN: 680 mL / OUT: 2560 mL / NET: -1880 mL        PHYSICAL EXAM:  GEN: Appears in no acute distress  HEENT: PERRLA, EOMI and accommodate, neck supple, no lymphadenopathy, no JVD  MOUTH: moist mucous membranes, no exudates or lesions   PULM: bilateral crackles. Pt on NC at time of exam  CV: distant heart sounds, Regular rate and rhythm, S1S2, no murmurs, rubs or gallops  ABD: Soft, nontender to palpation, non-distended, normoactive bowel sounds  EXTREMITIES: No edema, +1 peripheral pulses, large ecchymosis on L side from impella  NEURO: AAOx3, moving all four extremities spontaneously  TUBES: L peripheral IV, james draining dark urine (casts? blood?) in it low volume, shiley R femoral      LABS & IMAGING:  Labs reviewed personally.                        9.1    20.29 )-----------( 393      ( 19 Sep 2017 07:32 )             27.6     Hgb Trend: 9.1<--, 8.8<--, 9.2<--, 9.4<--, 10.4<--  09-19    130<L>  |  85<L>  |  76<H>  ----------------------------<  86  6.1<H>   |  17<L>  |  7.75<H>    Ca    9.8      19 Sep 2017 07:33  Phos  9.8     09-19  Mg     2.7     09-19    TPro  7.6  /  Alb  3.2<L>  /  TBili  0.5  /  DBili  x   /  AST  28  /  ALT  21  /  AlkPhos  102  09-18    Creatinine Trend: 7.75<--, 7.46<--, 8.75<--, 5.05<--, 7.22<--, 9.70<--        Imaging reviewed personally.

## 2017-09-19 NOTE — PROGRESS NOTE ADULT - SUBJECTIVE AND OBJECTIVE BOX
No CP, SOB, c/o R flank pain    Vital Signs Last 24 Hrs  T(C): 36.8 (09-19-17 @ 04:30), Max: 36.8 (09-18-17 @ 20:18)  T(F): 98.3 (09-19-17 @ 04:30), Max: 98.3 (09-18-17 @ 20:18)  HR: 83 (09-19-17 @ 04:30) (72 - 83)  BP: 121/77 (09-19-17 @ 04:30) (102/68 - 144/79)  BP(mean): --  RR: 18 (09-19-17 @ 04:30) (18 - 18)  SpO2: 98% (09-19-17 @ 04:30) (91% - 100%)    Physical Exam:  · Constitutional	Well-developed, well nourished	  · Neck	supple	  		  · Respiratory Details	decr BS at bases b/l	  · Cardiovascular	Regular rate & rhythm, normal S1, S2; no murmurs, gallops or rubs	  · Gastrointestinal	Soft, non-tender, ND, normal bowel sounds	  · Extremities	no edema	  · Extremities Comments	Right femoral dialysis catheter in place	  		  		  		    insulin lispro (HumaLOG) corrective regimen sliding scale   SubCutaneous three times a day before meals  insulin lispro (HumaLOG) corrective regimen sliding scale   SubCutaneous at bedtime  dextrose 5%. 1000 milliLiter(s) IV Continuous <Continuous>  dextrose Gel 1 Dose(s) Oral once PRN  dextrose 50% Injectable 12.5 Gram(s) IV Push once  dextrose 50% Injectable 25 Gram(s) IV Push once  dextrose 50% Injectable 25 Gram(s) IV Push once  glucagon  Injectable 1 milliGRAM(s) IntraMuscular once PRN  aspirin enteric coated 81 milliGRAM(s) Oral daily  tamsulosin 0.4 milliGRAM(s) Oral at bedtime  isosorbide   mononitrate ER Tablet (IMDUR) 30 milliGRAM(s) Oral daily  ranolazine 1000 milliGRAM(s) Oral two times a day  clopidogrel Tablet 75 milliGRAM(s) Oral daily  cefTRIAXone   IVPB 1 Gram(s) IV Intermittent every 24 hours  buDESOnide  80 MICROgram(s)/formoterol 4.5 MICROgram(s) Inhaler 2 Puff(s) Inhalation two times a day  ALBUTerol    90 MICROgram(s) HFA Inhaler 2 Puff(s) Inhalation every 6 hours PRN  busPIRone 5 milliGRAM(s) Oral two times a day  cefTRIAXone   IVPB      acetaminophen   Tablet. 650 milliGRAM(s) Oral every 6 hours PRN  atorvastatin 80 milliGRAM(s) Oral at bedtime  oxyCODONE    IR 5 milliGRAM(s) Oral every 4 hours PRN  metoprolol succinate  milliGRAM(s) Oral daily  heparin  Injectable 5000 Unit(s) SubCutaneous every 8 hours  hydrALAZINE 10 milliGRAM(s) Oral three times a day  sevelamer hydrochloride 1600 milliGRAM(s) Oral three times a day with meals                                            9.1    20.29 )-----------( 393      ( 19 Sep 2017 07:32 )             27.6     19 Sep 2017 07:33    130    |  85     |  76     ----------------------------<  86     6.1     |  17     |  7.75     Ca    9.8        19 Sep 2017 07:33  Phos  9.8       19 Sep 2017 07:33  Mg     2.7       19 Sep 2017 07:33    TPro  7.6    /  Alb  3.2    /  TBili  0.5    /  DBili  x      /  AST  28     /  ALT  21     /  AlkPhos  102    18 Sep 2017 09:36    LIVER FUNCTIONS - ( 18 Sep 2017 09:36 )  Alb: 3.2 g/dL / Pro: 7.6 g/dL / ALK PHOS: 102 U/L / ALT: 21 U/L / AST: 28 U/L / GGT: x                Assessment /  Plan:    DM, obstructive LIN on CKD in setting of L kidney atrophy and R hydro due to stone  Started on HD [facility]  S/p cath, PCI  HD today  Pt w/LIN on CKD in setting of L kidney atrophy and obstructed R kidney   following, R PCN/stent as soon as possible  BMP daily  No nephrotoxins  Femoral HD cath to be d/c-d and new cath placed by IR hopefully today  Renal diet

## 2017-09-19 NOTE — PROGRESS NOTE ADULT - ATTENDING COMMENTS
Patient interviewed, examined and chart reviewed.  Case discussed with fellow.  Agree w/ Assessment and Plan as outlined.    Iggy Ken MD [facility]  P: [phone]  Spectra:  [phone]  Office: [phone]

## 2017-09-19 NOTE — PROVIDER CONTACT NOTE (FALL NOTIFICATION) - ACTION/TREATMENT ORDERED:
Please see fall assessment kit.  Patient placed back into bed with bed locked and lowest to ground, bed alarm set.  Red fall socks and yellow fall risk bracelet remain in place.  Re-education provided

## 2017-09-20 LAB
ANION GAP SERPL CALC-SCNC: 26 MMOL/L — HIGH (ref 5–17)
ANION GAP SERPL CALC-SCNC: 31 MMOL/L — HIGH (ref 5–17)
BASOPHILS # BLD AUTO: 0.02 K/UL — SIGNIFICANT CHANGE UP (ref 0–0.2)
BASOPHILS NFR BLD AUTO: 0.1 % — SIGNIFICANT CHANGE UP (ref 0–2)
BLD GP AB SCN SERPL QL: NEGATIVE — SIGNIFICANT CHANGE UP
BUN SERPL-MCNC: 85 MG/DL — HIGH (ref 7–23)
BUN SERPL-MCNC: 94 MG/DL — HIGH (ref 7–23)
CALCIUM SERPL-MCNC: 8.7 MG/DL — SIGNIFICANT CHANGE UP (ref 8.4–10.5)
CALCIUM SERPL-MCNC: 9.3 MG/DL — SIGNIFICANT CHANGE UP (ref 8.4–10.5)
CHLORIDE SERPL-SCNC: 83 MMOL/L — LOW (ref 96–108)
CHLORIDE SERPL-SCNC: 88 MMOL/L — LOW (ref 96–108)
CK MB BLD-MCNC: 3.7 % — HIGH (ref 0–3.5)
CK MB BLD-MCNC: 3.9 % — HIGH (ref 0–3.5)
CK MB CFR SERPL CALC: 3.1 NG/ML — SIGNIFICANT CHANGE UP (ref 0–6.7)
CK MB CFR SERPL CALC: 3.6 NG/ML — SIGNIFICANT CHANGE UP (ref 0–6.7)
CK SERPL-CCNC: 83 U/L — SIGNIFICANT CHANGE UP (ref 30–200)
CK SERPL-CCNC: 92 U/L — SIGNIFICANT CHANGE UP (ref 30–200)
CO2 SERPL-SCNC: 17 MMOL/L — LOW (ref 22–31)
CO2 SERPL-SCNC: 22 MMOL/L — SIGNIFICANT CHANGE UP (ref 22–31)
CREAT SERPL-MCNC: 10.55 MG/DL — HIGH (ref 0.5–1.3)
CREAT SERPL-MCNC: 8.96 MG/DL — HIGH (ref 0.5–1.3)
EOSINOPHIL # BLD AUTO: 0.05 K/UL — SIGNIFICANT CHANGE UP (ref 0–0.5)
EOSINOPHIL NFR BLD AUTO: 0.3 % — SIGNIFICANT CHANGE UP (ref 0–6)
GLUCOSE SERPL-MCNC: 156 MG/DL — HIGH (ref 70–99)
GLUCOSE SERPL-MCNC: 160 MG/DL — HIGH (ref 70–99)
HCT VFR BLD CALC: 23.6 % — LOW (ref 39–50)
HCT VFR BLD CALC: 25.4 % — LOW (ref 39–50)
HGB BLD-MCNC: 7.8 G/DL — LOW (ref 13–17)
HGB BLD-MCNC: 8.4 G/DL — LOW (ref 13–17)
IMM GRANULOCYTES NFR BLD AUTO: 0.6 % — SIGNIFICANT CHANGE UP (ref 0–1.5)
LYMPHOCYTES # BLD AUTO: 1.27 K/UL — SIGNIFICANT CHANGE UP (ref 1–3.3)
LYMPHOCYTES # BLD AUTO: 8.1 % — LOW (ref 13–44)
MAGNESIUM SERPL-MCNC: 2.5 MG/DL — SIGNIFICANT CHANGE UP (ref 1.6–2.6)
MAGNESIUM SERPL-MCNC: 2.6 MG/DL — SIGNIFICANT CHANGE UP (ref 1.6–2.6)
MCHC RBC-ENTMCNC: 26.4 PG — LOW (ref 27–34)
MCHC RBC-ENTMCNC: 28.1 PG — SIGNIFICANT CHANGE UP (ref 27–34)
MCHC RBC-ENTMCNC: 32.9 GM/DL — SIGNIFICANT CHANGE UP (ref 32–36)
MCHC RBC-ENTMCNC: 33.1 GM/DL — SIGNIFICANT CHANGE UP (ref 32–36)
MCV RBC AUTO: 79.9 FL — LOW (ref 80–100)
MCV RBC AUTO: 85.5 FL — SIGNIFICANT CHANGE UP (ref 80–100)
MONOCYTES # BLD AUTO: 1.15 K/UL — HIGH (ref 0–0.9)
MONOCYTES NFR BLD AUTO: 7.3 % — SIGNIFICANT CHANGE UP (ref 2–14)
NEUTROPHILS # BLD AUTO: 13.15 K/UL — HIGH (ref 1.8–7.4)
NEUTROPHILS NFR BLD AUTO: 83.6 % — HIGH (ref 43–77)
PHOSPHATE SERPL-MCNC: 10.6 MG/DL — HIGH (ref 2.5–4.5)
PHOSPHATE SERPL-MCNC: 12.3 MG/DL — HIGH (ref 2.5–4.5)
PLATELET # BLD AUTO: 295 K/UL — SIGNIFICANT CHANGE UP (ref 150–400)
PLATELET # BLD AUTO: 387 K/UL — SIGNIFICANT CHANGE UP (ref 150–400)
POTASSIUM SERPL-MCNC: 3.9 MMOL/L — SIGNIFICANT CHANGE UP (ref 3.5–5.3)
POTASSIUM SERPL-MCNC: 5.3 MMOL/L — SIGNIFICANT CHANGE UP (ref 3.5–5.3)
POTASSIUM SERPL-SCNC: 3.9 MMOL/L — SIGNIFICANT CHANGE UP (ref 3.5–5.3)
POTASSIUM SERPL-SCNC: 5.3 MMOL/L — SIGNIFICANT CHANGE UP (ref 3.5–5.3)
RBC # BLD: 2.76 M/UL — LOW (ref 4.2–5.8)
RBC # BLD: 3.18 M/UL — LOW (ref 4.2–5.8)
RBC # FLD: 12.2 % — SIGNIFICANT CHANGE UP (ref 10.3–14.5)
RBC # FLD: 13.4 % — SIGNIFICANT CHANGE UP (ref 10.3–14.5)
RH IG SCN BLD-IMP: NEGATIVE — SIGNIFICANT CHANGE UP
SODIUM SERPL-SCNC: 131 MMOL/L — LOW (ref 135–145)
SODIUM SERPL-SCNC: 136 MMOL/L — SIGNIFICANT CHANGE UP (ref 135–145)
TROPONIN T SERPL-MCNC: 1.98 NG/ML — HIGH (ref 0–0.06)
TROPONIN T SERPL-MCNC: 2.1 NG/ML — HIGH (ref 0–0.06)
WBC # BLD: 15.73 K/UL — HIGH (ref 3.8–10.5)
WBC # BLD: 17.5 K/UL — HIGH (ref 3.8–10.5)
WBC # FLD AUTO: 15.73 K/UL — HIGH (ref 3.8–10.5)
WBC # FLD AUTO: 17.5 K/UL — HIGH (ref 3.8–10.5)

## 2017-09-20 PROCEDURE — 76937 US GUIDE VASCULAR ACCESS: CPT | Mod: 26

## 2017-09-20 PROCEDURE — 71010: CPT | Mod: 26

## 2017-09-20 PROCEDURE — 36556 INSERT NON-TUNNEL CV CATH: CPT

## 2017-09-20 PROCEDURE — 99233 SBSQ HOSP IP/OBS HIGH 50: CPT | Mod: GC

## 2017-09-20 PROCEDURE — 77001 FLUOROGUIDE FOR VEIN DEVICE: CPT | Mod: 26

## 2017-09-20 PROCEDURE — 93010 ELECTROCARDIOGRAM REPORT: CPT

## 2017-09-20 PROCEDURE — 99232 SBSQ HOSP IP/OBS MODERATE 35: CPT | Mod: GC

## 2017-09-20 RX ORDER — OXYCODONE HYDROCHLORIDE 5 MG/1
5 TABLET ORAL EVERY 4 HOURS
Qty: 0 | Refills: 0 | Status: DISCONTINUED | OUTPATIENT
Start: 2017-09-20 | End: 2017-09-22

## 2017-09-20 RX ADMIN — SEVELAMER CARBONATE 1600 MILLIGRAM(S): 2400 POWDER, FOR SUSPENSION ORAL at 17:55

## 2017-09-20 RX ADMIN — HEPARIN SODIUM 5000 UNIT(S): 5000 INJECTION INTRAVENOUS; SUBCUTANEOUS at 06:07

## 2017-09-20 RX ADMIN — Medication 10 MILLIGRAM(S): at 06:07

## 2017-09-20 RX ADMIN — Medication 10 MILLIGRAM(S): at 23:00

## 2017-09-20 RX ADMIN — Medication 1: at 17:54

## 2017-09-20 RX ADMIN — BUDESONIDE AND FORMOTEROL FUMARATE DIHYDRATE 2 PUFF(S): 160; 4.5 AEROSOL RESPIRATORY (INHALATION) at 06:08

## 2017-09-20 RX ADMIN — RANOLAZINE 1000 MILLIGRAM(S): 500 TABLET, FILM COATED, EXTENDED RELEASE ORAL at 06:07

## 2017-09-20 RX ADMIN — Medication 5 MILLIGRAM(S): at 06:07

## 2017-09-20 RX ADMIN — CLOPIDOGREL BISULFATE 75 MILLIGRAM(S): 75 TABLET, FILM COATED ORAL at 17:56

## 2017-09-20 RX ADMIN — Medication 1: at 08:17

## 2017-09-20 RX ADMIN — SODIUM POLYSTYRENE SULFONATE 15 GRAM(S): 4.1 POWDER, FOR SUSPENSION ORAL at 09:29

## 2017-09-20 RX ADMIN — ATORVASTATIN CALCIUM 80 MILLIGRAM(S): 80 TABLET, FILM COATED ORAL at 23:00

## 2017-09-20 RX ADMIN — Medication 150 MILLIGRAM(S): at 06:07

## 2017-09-20 RX ADMIN — RANOLAZINE 1000 MILLIGRAM(S): 500 TABLET, FILM COATED, EXTENDED RELEASE ORAL at 17:55

## 2017-09-20 RX ADMIN — Medication 5 MILLIGRAM(S): at 17:56

## 2017-09-20 RX ADMIN — ISOSORBIDE MONONITRATE 30 MILLIGRAM(S): 60 TABLET, EXTENDED RELEASE ORAL at 17:55

## 2017-09-20 RX ADMIN — SEVELAMER CARBONATE 1600 MILLIGRAM(S): 2400 POWDER, FOR SUSPENSION ORAL at 12:53

## 2017-09-20 RX ADMIN — TAMSULOSIN HYDROCHLORIDE 0.4 MILLIGRAM(S): 0.4 CAPSULE ORAL at 23:00

## 2017-09-20 RX ADMIN — Medication 81 MILLIGRAM(S): at 17:57

## 2017-09-20 RX ADMIN — BUDESONIDE AND FORMOTEROL FUMARATE DIHYDRATE 2 PUFF(S): 160; 4.5 AEROSOL RESPIRATORY (INHALATION) at 17:56

## 2017-09-20 RX ADMIN — Medication 1: at 12:53

## 2017-09-20 RX ADMIN — SODIUM POLYSTYRENE SULFONATE 15 GRAM(S): 4.1 POWDER, FOR SUSPENSION ORAL at 06:07

## 2017-09-20 RX ADMIN — SEVELAMER CARBONATE 1600 MILLIGRAM(S): 2400 POWDER, FOR SUSPENSION ORAL at 08:18

## 2017-09-20 RX ADMIN — SODIUM POLYSTYRENE SULFONATE 15 GRAM(S): 4.1 POWDER, FOR SUSPENSION ORAL at 01:18

## 2017-09-20 RX ADMIN — CEFTRIAXONE 100 GRAM(S): 500 INJECTION, POWDER, FOR SOLUTION INTRAMUSCULAR; INTRAVENOUS at 20:00

## 2017-09-20 NOTE — PROGRESS NOTE ADULT - ATTENDING COMMENTS
Pt amenable to receiving IJ dialysis catheter today in IR. Plan to dialyze post catheter placement given electrolyte derangements and worsening uremia. Monitor CBC given episode of blood in stool this am.

## 2017-09-20 NOTE — PROGRESS NOTE ADULT - SUBJECTIVE AND OBJECTIVE BOX
Patient seen in follow up for ESRD. Earlier he developed brief syncopal episode with hypotension and tachycardia about 25 minutes into treatment. UF at the time was 300 ml which was returned. HD was terminated; patient denied chest pain but complained of throat pain; EKG done. Patient was evaluated by team.    He is currently comfortable, no distress. Mild throat discomfort; family at bedside; BP and HR stable.    MEDICATIONS  (STANDING):  insulin lispro (HumaLOG) corrective regimen sliding scale   SubCutaneous three times a day before meals  insulin lispro (HumaLOG) corrective regimen sliding scale   SubCutaneous at bedtime  dextrose 5%. 1000 milliLiter(s) (50 mL/Hr) IV Continuous <Continuous>  dextrose 50% Injectable 12.5 Gram(s) IV Push once  dextrose 50% Injectable 25 Gram(s) IV Push once  dextrose 50% Injectable 25 Gram(s) IV Push once  aspirin enteric coated 81 milliGRAM(s) Oral daily  tamsulosin 0.4 milliGRAM(s) Oral at bedtime  isosorbide   mononitrate ER Tablet (IMDUR) 30 milliGRAM(s) Oral daily  ranolazine 1000 milliGRAM(s) Oral two times a day  clopidogrel Tablet 75 milliGRAM(s) Oral daily  cefTRIAXone   IVPB 1 Gram(s) IV Intermittent every 24 hours  buDESOnide  80 MICROgram(s)/formoterol 4.5 MICROgram(s) Inhaler 2 Puff(s) Inhalation two times a day  busPIRone 5 milliGRAM(s) Oral two times a day  cefTRIAXone   IVPB      atorvastatin 80 milliGRAM(s) Oral at bedtime  metoprolol succinate  milliGRAM(s) Oral daily  heparin  Injectable 5000 Unit(s) SubCutaneous every 8 hours  hydrALAZINE 10 milliGRAM(s) Oral three times a day  sevelamer hydrochloride 1600 milliGRAM(s) Oral three times a day with meals    MEDICATIONS  (PRN):  dextrose Gel 1 Dose(s) Oral once PRN Blood Glucose LESS THAN 70 milliGRAM(s)/deciliter  glucagon  Injectable 1 milliGRAM(s) IntraMuscular once PRN Glucose LESS THAN 70 milligrams/deciliter  ALBUTerol    90 MICROgram(s) HFA Inhaler 2 Puff(s) Inhalation every 6 hours PRN Shortness of Breath  acetaminophen   Tablet. 650 milliGRAM(s) Oral every 6 hours PRN pain  oxyCODONE    IR 5 milliGRAM(s) Oral every 4 hours PRN Severe Pain (7 - 10)    PHYSICAL EXAM:      T(C): 36.7 (09-20-17 @ 15:53), Max: 36.8 (09-19-17 @ 20:25)  HR: 91 (09-20-17 @ 15:53) (71 - 91)  BP: 129/86 (09-20-17 @ 15:53) (119/69 - 149/81)  RR: 18 (09-20-17 @ 15:53) (17 - 18)  SpO2: 97% (09-20-17 @ 15:53) (93% - 100%)  Wt(kg): --  Respiratory: clear anteriorly, decreased BS at bases  Cardiovascular: S1 S2 irregular  Gastrointestinal: soft NT ND +BS  Extremities:   1 edema                                    7.8    17.5  )-----------( 295      ( 20 Sep 2017 15:02 )             23.6     09-20    136  |  88<L>  |  85<H>  ----------------------------<  160<H>  3.9   |  22  |  8.96<H>    Ca    8.7      20 Sep 2017 15:02  Phos  10.6     09-20  Mg     2.5     09-20            Assessment and Plan:    ESRD on HD; syncopal episode with tachycardia, afib/flutter; hypotension.  Hold HD for today; cardiology follow up;  Phos binder rx.  Will evaluate for tomorrow.

## 2017-09-20 NOTE — PROVIDER CONTACT NOTE (CHANGE IN STATUS NOTIFICATION) - RECOMMENDATIONS
Treatment terminated, Normal saline bolus 200 cc given EKG done . administered oxygen 2l/min nasal cannula. Bailey srivastava gp back to floor per. Dr. White

## 2017-09-20 NOTE — PROGRESS NOTE ADULT - ASSESSMENT
71 yo male transferred from [facility] for NSTEMI. Patient now s/p 5 stent placement, with new onset CHF, LIN on CKD, pyelonephritis.   samaria paniagua  plan to complete the ceftriaxone on  Friday

## 2017-09-20 NOTE — PROGRESS NOTE ADULT - SUBJECTIVE AND OBJECTIVE BOX
Subjective    Patient seen and examined. Denies fevers/chills, n/v, flank pain. Noted to be high risk by medicine and cardiology for anesthesia. Continues to make some urine. Initially refusing HD, but had agreed. HD stopped today due to unstable vital signs.     Objective    Vital signs  T(F): , Max: 98.2 (09-20-17 @ 20:47)  HR: 88 (09-20-17 @ 21:14)  BP: 138/75 (09-20-17 @ 21:14)  SpO2: 100% (09-20-17 @ 21:14)  Wt(kg): --    Output     09-19 @ 07:01  -  09-20 @ 07:00  --------------------------------------------------------  IN: 760 mL / OUT: 95 mL / NET: 665 mL    09-20 @ 07:01  -  09-20 @ 22:25  --------------------------------------------------------  IN: 1080 mL / OUT: 334 mL / NET: 746 mL        General: NAD  Resp: non-labored, on 02  Abdomen: soft/non-tender/non-distended  : james in place, draining yellow urine, intermittent cranberry, no clots      Labs      09-20 @ 15:02    WBC 17.5  / Hct 23.6  / SCr 8.96     09-20 @ 07:30    WBC 15.73 / Hct 25.4  / SCr --

## 2017-09-20 NOTE — PROGRESS NOTE ADULT - PROBLEM SELECTOR PLAN 4
Volume status remains an issue (new HD & CHF)  - Echo: EF 35%. Mild-moderate MR, mod AS, mild LV enlargement, eccentric LV hypertrophy (dilated LV w/ nl relative wall thickness). Mod-severe segmental LV sys dysfunction. The mid to distal septum, mid to distal inferior, apical cap akinetic. Nl RV size & sys fxn  - c/w metoprolol succ 150 - will titrate w/ BPs  - c/w ASA, plavix, lipitor  - c/w ranexa, imdur  - c/w hydralazine 10 tid  - plan to start ACE-I once patient is deemed ESRD and HD dependant

## 2017-09-20 NOTE — PROGRESS NOTE ADULT - ASSESSMENT
73 y/o M with Right functional solitary kidney now obstructed with ureteral stone c/b renal failure requiring initiation of hemodialysis,  new NSTEMI s/p cardiac cath with 5 stents. Patient has extensive high risk cardiac disease with multiple prior stents.    --patient still with active medical issues, electrolyte abnormalities, not able to tolerate dialysis today.   --currently still very high risk for general anesthesia with severe cardiac disease, on anticoagulation. Needs further optimization before consideration for ureteral stent insertion.   --continue to optimize medically, continue flomax, strict I/Os, dialysis as needed  --Follow up cardiology/medicine/nephrology.  --d/w Dr. Yanes

## 2017-09-20 NOTE — PROCEDURE NOTE - NSPROCDETAILS_GEN_ALL_CORE
guidewire recovered/sterile dressing applied/sterile technique, catheter placed/ultrasound guidance/lumen(s) aspirated and flushed

## 2017-09-20 NOTE — PROGRESS NOTE ADULT - PROBLEM SELECTOR PLAN 2
- Patient started on ceftriaxone ~9/6 for pyelonephritis. Day 14 today.  - c/w ceftriaxone  - ID following  - given increased WBC today, pending UClx, BClx results  - repeat US renal given increasing WBC - c/w ceftriaxone 9/6 for pyelonephritis. Day 15 today - likely to finish Friday  - ID following  - UClx neg  - BClx NGTD

## 2017-09-20 NOTE — PROVIDER CONTACT NOTE (CHANGE IN STATUS NOTIFICATION) - BACKGROUND
Pt had new Right IJ insertion at IR placement was confirmed started hemo dialysis treatment. past 20 minutes on treatment ,pt became hypotensive and tachy and was passing out. b/p=88/52 LG=096's

## 2017-09-20 NOTE — PROGRESS NOTE ADULT - SUBJECTIVE AND OBJECTIVE BOX
73 yo Irish speaking male CAD s/p stents and CKD presented to IR for non tunneled  HD catheter. Pt is ao X 2 ( doesn't know where he is) . Spoke with pt via Irish  ([id number]) and family at bedside .     Allergies:No Known Allergies      PAST MEDICAL & SURGICAL HISTORY:  CVA (cerebral vascular accident)  COPD (chronic obstructive pulmonary disease)  BPH (benign prostatic hyperplasia)  Bipolar affective disorder  CKD (chronic kidney disease)  Pancreatitis  Hypertension  Dyslipidemia  Diabetes mellitus  Coronary artery disease  History of cardiac catheterization        Pertinent labs:                      8.4    15.73 )-----------( 387      ( 20 Sep 2017 07:30 )             25.4   09-20    131<L>  |  83<L>  |  94<H>  ----------------------------<  156<H>  5.3   |  17<L>  |  10.55<H>    Ca    9.3      20 Sep 2017 07:25  Phos  12.3     09-20  Mg     2.6     09-20        Consent: Procedure/risks/ Benefits explained. Informed consent obtained from daughter via phone. Pt's daughter  verbalizes understanding.

## 2017-09-20 NOTE — CHART NOTE - NSCHARTNOTEFT_GEN_A_CORE
Called by RN in dialysis as patient syncopized during dialysis. Received approximately 30 minutes of dialysis prior to syncope, had 300cc returned, patient returned to baseline mental status. Noted to be tachycardic to the 130s. Patient denies chest pain, shortness of breath. Reports "tightness" feeling in his throat. EKG obtained. Noted to have new Afib/flutter. BPs stable. Full set of labs obtained - CBC, BMP, Mag, Phos, cardiac enzymes. Will call cardiology. HD stopped. Patient to be transferred back to the floor. Called by RN in dialysis as patient syncopized during dialysis. Received approximately 30 minutes of dialysis prior to syncope, had 300cc returned, patient returned to baseline mental status. Noted to be tachycardic to the 130s. Spoke with patient via [employer]  #[phone] and patient denies chest pain, shortness of breath. Reports "tightness" feeling in his throat. EKG obtained. Noted to have new Afib/flutter. BPs stable. Full set of labs obtained - CBC, BMP, Mag, Phos, cardiac enzymes. Will call cardiology. HD stopped. Patient to be transferred back to the floor.

## 2017-09-20 NOTE — PROGRESS NOTE ADULT - PROBLEM SELECTOR PLAN 3
CAD w/ multiple stents. s/p cath 9/14 - 2 stents LAD, 3 RCA w/ Impella assistance.   - continues to have intermittent chest pain, worse when overloaded  - c/w ASA, plavix, lipitor, BB  - c/w ranexa, imdur  - c/w hydralazine 10 tid  - appreciate cards recs

## 2017-09-20 NOTE — PROGRESS NOTE ADULT - PROBLEM SELECTOR PLAN 5
Patient w/ hematuria as well as large ecchymoses in L groin, no signs of hematoma.   - stable  - not requiring transfusion  - daily CBC

## 2017-09-20 NOTE — PROGRESS NOTE ADULT - PROBLEM SELECTOR PLAN 1
- Acute renal failure on CKD in setting of obstructed R kidney (L kidney atrophic).   - c/w HD, last yesterday  - R fem shiley > one week old - IR to replace today  - Urology will proceed with ureteral stent, awaiting urine clx and cardiac clearance  - mild hematuria, started with DAPTs, continue to monitor, urology following  - monitor daily K and BMP  - K 6.1 today - albuterol/insulin/d50/kayexalate as HD will be later today - Acute renal failure on CKD in setting of obstructed R kidney (L kidney atrophic).   - c/w HD, next today  - R fem shiley removed yesterday without complication  - IR to replace today in IJ  - Urology will proceed with ureteral stent, awaiting urine clx and cardiac clearance  - mild hematuria, started with DAPTs, continue to monitor, urology following  - monitor daily K and BMP  - K 6.1 today - albuterol/insulin/d50/kayexalate as HD will be later today - Acute renal failure on CKD in setting of obstructed R kidney (L kidney atrophic).   - c/w HD, next today  - R fem shiley removed yesterday without complication  - IR to replace today in IJ  - Urology will proceed with ureteral stent, awaiting urine clx and cardiac clearance  - mild hematuria, started with DAPTs, continue to monitor, urology following  - monitor daily K and BMP  - K 5.3 s/p kayexalate overnight

## 2017-09-20 NOTE — PROGRESS NOTE ADULT - ASSESSMENT
72M PMH extensive CAD with large # of stents, CKD, COPD, T2DM, ?bipolar, prior CVA transferred [facility] for NSTEMI. Initially had been admitted for pyelonephritis complicated by ARF (due to obstructing stones and infection), required intubation for hypoxic respiratory failure due to pulmonary edema, now requiring HD for his renal failure. Patient now s/p 5 stent placement with acute/chronic HFrEF. Urology will likely do ureteral stent to decompress kidney after clearance by ID and cardiology.

## 2017-09-20 NOTE — PROVIDER CONTACT NOTE (CHANGE IN STATUS NOTIFICATION) - ACTION/TREATMENT ORDERED:
Seen and evaluated by Dr. White with  [id number]. read EKG, cardiac enzymes sent CBC, BMP ,POst vitals b/p 121/82 HR=89 O2 sat=98% on 2 l/min N.C.. Report given to primary floor RN

## 2017-09-20 NOTE — PROGRESS NOTE ADULT - SUBJECTIVE AND OBJECTIVE BOX
infectious diseases progress note:    Patient is a 72y old  Male who presents with a chief complaint of s/p cardiac cath (12 Sep 2017 19:27)        Non-ST elevation (NSTEMI) myocardial infarction        ROS:  CONSTITUTIONAL:  Negative fever or chills, feels well, good appetite  EYES:  Negative  blurry vision or double vision  CARDIOVASCULAR:  Negative for chest pain or palpitations  RESPIRATORY:  Negative for cough, wheezing, or SOB   GASTROINTESTINAL:  Negative for nausea, vomiting, diarrhea, constipation, or abdominal pain  GENITOURINARY:  Negative frequency, urgency or dysuria  NEUROLOGIC:  No headache, confusion, dizziness, lightheadedness    Allergies    No Known Allergies    Intolerances        ANTIBIOTICS/RELEVANT:  antimicrobials  cefTRIAXone   IVPB 1 Gram(s) IV Intermittent every 24 hours  cefTRIAXone   IVPB        immunologic:    OTHER:  insulin lispro (HumaLOG) corrective regimen sliding scale   SubCutaneous three times a day before meals  insulin lispro (HumaLOG) corrective regimen sliding scale   SubCutaneous at bedtime  dextrose 5%. 1000 milliLiter(s) IV Continuous <Continuous>  dextrose Gel 1 Dose(s) Oral once PRN  dextrose 50% Injectable 12.5 Gram(s) IV Push once  dextrose 50% Injectable 25 Gram(s) IV Push once  dextrose 50% Injectable 25 Gram(s) IV Push once  glucagon  Injectable 1 milliGRAM(s) IntraMuscular once PRN  aspirin enteric coated 81 milliGRAM(s) Oral daily  tamsulosin 0.4 milliGRAM(s) Oral at bedtime  isosorbide   mononitrate ER Tablet (IMDUR) 30 milliGRAM(s) Oral daily  ranolazine 1000 milliGRAM(s) Oral two times a day  clopidogrel Tablet 75 milliGRAM(s) Oral daily  buDESOnide  80 MICROgram(s)/formoterol 4.5 MICROgram(s) Inhaler 2 Puff(s) Inhalation two times a day  ALBUTerol    90 MICROgram(s) HFA Inhaler 2 Puff(s) Inhalation every 6 hours PRN  busPIRone 5 milliGRAM(s) Oral two times a day  acetaminophen   Tablet. 650 milliGRAM(s) Oral every 6 hours PRN  atorvastatin 80 milliGRAM(s) Oral at bedtime  oxyCODONE    IR 5 milliGRAM(s) Oral every 4 hours PRN  metoprolol succinate  milliGRAM(s) Oral daily  heparin  Injectable 5000 Unit(s) SubCutaneous every 8 hours  hydrALAZINE 10 milliGRAM(s) Oral three times a day  sevelamer hydrochloride 1600 milliGRAM(s) Oral three times a day with meals  sodium polystyrene sulfonate Suspension 15 Gram(s) Oral every 4 hours      Objective:  Vital Signs Last 24 Hrs  T(C): 36.7 (20 Sep 2017 05:03), Max: 36.8 (19 Sep 2017 12:07)  T(F): 98 (20 Sep 2017 05:03), Max: 98.3 (19 Sep 2017 20:25)  HR: 82 (20 Sep 2017 05:03) (70 - 82)  BP: 149/81 (20 Sep 2017 05:03) (112/69 - 149/81)  BP(mean): --  RR: 18 (20 Sep 2017 05:03) (17 - 18)  SpO2: 96% (20 Sep 2017 05:03) (93% - 98%)    PHYSICAL EXAM:  Constitutional:Well-developed, well nourished--no acute distress  Eyes:CHARLINE, EOMI  Ear/Nose/Throat: no oral lesion, no sinus tenderness on percussion	  Neck:no JVD, no lymphadenopathy, supple  Respiratory: CTA susan  Cardiovascular: S1S2 RRR, no murmurs  Gastrointestinal:soft, (+) BS, no HSM  Extremities:no e/e/c        LABS:                        9.1    20.29 )-----------( 393      ( 19 Sep 2017 07:32 )             27.6     09-19    130<L>  |  84<L>  |  90<H>  ----------------------------<  101<H>  6.4<HH>   |  19<L>  |  9.28<H>    Ca    9.6      19 Sep 2017 19:45  Phos  9.8     09-19  Mg     2.7     09-19    TPro  7.6  /  Alb  3.2<L>  /  TBili  0.5  /  DBili  x   /  AST  28  /  ALT  21  /  AlkPhos  102  09-18            MICROBIOLOGY:    RECENT CULTURES:        RESPIRATORY CULTURES:              RADIOLOGY & ADDITIONAL STUDIES:        Pager [phone]  After 5 pm/weekends or if no response :[phone]

## 2017-09-20 NOTE — PROGRESS NOTE ADULT - SUBJECTIVE AND OBJECTIVE BOX
Patient is a 72y old  Male who presents with a chief complaint of s/p cardiac cath (12 Sep 2017 19:27)      SUBJECTIVE / OVERNIGHT EVENTS: No acute events overnight. Patient denies F/C, HA, CP, SOB, abdominal pain, N/V/D/C.     MEDICATIONS  (STANDING):  insulin lispro (HumaLOG) corrective regimen sliding scale   SubCutaneous three times a day before meals  insulin lispro (HumaLOG) corrective regimen sliding scale   SubCutaneous at bedtime  dextrose 5%. 1000 milliLiter(s) (50 mL/Hr) IV Continuous <Continuous>  dextrose 50% Injectable 12.5 Gram(s) IV Push once  dextrose 50% Injectable 25 Gram(s) IV Push once  dextrose 50% Injectable 25 Gram(s) IV Push once  aspirin enteric coated 81 milliGRAM(s) Oral daily  tamsulosin 0.4 milliGRAM(s) Oral at bedtime  isosorbide   mononitrate ER Tablet (IMDUR) 30 milliGRAM(s) Oral daily  ranolazine 1000 milliGRAM(s) Oral two times a day  clopidogrel Tablet 75 milliGRAM(s) Oral daily  cefTRIAXone   IVPB 1 Gram(s) IV Intermittent every 24 hours  buDESOnide  80 MICROgram(s)/formoterol 4.5 MICROgram(s) Inhaler 2 Puff(s) Inhalation two times a day  busPIRone 5 milliGRAM(s) Oral two times a day  cefTRIAXone   IVPB      atorvastatin 80 milliGRAM(s) Oral at bedtime  metoprolol succinate  milliGRAM(s) Oral daily  heparin  Injectable 5000 Unit(s) SubCutaneous every 8 hours  hydrALAZINE 10 milliGRAM(s) Oral three times a day  sevelamer hydrochloride 1600 milliGRAM(s) Oral three times a day with meals  sodium polystyrene sulfonate Suspension 15 Gram(s) Oral every 4 hours    MEDICATIONS  (PRN):  dextrose Gel 1 Dose(s) Oral once PRN Blood Glucose LESS THAN 70 milliGRAM(s)/deciliter  glucagon  Injectable 1 milliGRAM(s) IntraMuscular once PRN Glucose LESS THAN 70 milligrams/deciliter  ALBUTerol    90 MICROgram(s) HFA Inhaler 2 Puff(s) Inhalation every 6 hours PRN Shortness of Breath  acetaminophen   Tablet. 650 milliGRAM(s) Oral every 6 hours PRN pain  oxyCODONE    IR 5 milliGRAM(s) Oral every 4 hours PRN Severe Pain (7 - 10)      T(F): 98 (09-20 @ 05:03), Max: 98.3 (09-19 @ 20:25)  HR: 82 (09-20 @ 05:03) (70 - 82)  BP: 149/81 (09-20 @ 05:03) (112/69 - 149/81)  RR: 18 (09-20 @ 05:03) (17 - 18)  SpO2: 96% (09-20 @ 05:03) (93% - 98%)    CAPILLARY BLOOD GLUCOSE  137 (19 Sep 2017 21:02)  138 (19 Sep 2017 17:46)  205 (19 Sep 2017 11:32)  140 (19 Sep 2017 07:48)          I&O's Summary    19 Sep 2017 07:01  -  20 Sep 2017 07:00  --------------------------------------------------------  IN: 760 mL / OUT: 95 mL / NET: 665 mL        PHYSICAL EXAM:  GEN: Appears in no acute distress  HEENT: PERRLA, EOMI and accommodate, neck supple, no lymphadenopathy, no JVD  MOUTH: moist mucous membranes, no exudates or lesions   PULM: Clear to auscultation bilaterally, no wheezes, rales, rhonchi  CV: Regular rate and rhythm, S1S2, no murmurs, rubs or gallops  ABD: Soft, nontender to palpation, non-distended, normoactive bowel sounds  EXTREMITIES: No edema, + peripheral pulses  NEURO: AAOx3, moving all four extremities spontaneously  SKIN: No rashes, lesions, hematomas, ecchymosis    LABS & IMAGING:  Labs personally reviewed [X]                        9.1    20.29 )-----------( 393      ( 19 Sep 2017 07:32 )             27.6     Hgb Trend: 9.1<--, 8.8<--, 9.2<--, 9.4<--, 10.4<--    09-19    130<L>  |  84<L>  |  90<H>  ----------------------------<  101<H>  6.4<HH>   |  19<L>  |  9.28<H>    Ca    9.6      19 Sep 2017 19:45  Phos  9.8     09-19  Mg     2.7     09-19    TPro  7.6  /  Alb  3.2<L>  /  TBili  0.5  /  DBili  x   /  AST  28  /  ALT  21  /  AlkPhos  102  09-18    Creatinine Trend: 9.28<--, 7.75<--, 7.46<--, 8.75<--, 5.05<--, 7.22<--          Imaging personally reviewed [ ] Patient is a 72y old  Male who presents with a chief complaint of s/p cardiac cath (12 Sep 2017 19:27)      SUBJECTIVE / OVERNIGHT EVENTS: Shiley in groin removed yesterday without complication. Patient refused replacement Shiley. Was extensively counseled on need for HD cath and emergent dialysis given his hyperkalemia. Was informed multiple times of his risk of sudden death. Patient's family was present. Patient showed capacity and reiterated that he did not want the cath despite his known risk of death. Patient was not agreeable to sign DNR at that time, though it was explained that he may not be able to be revived if he has a code from emergent hyperkalemia.     Patient was given kayexalate last night after K 6.4 result, had 3 BM o/n. This morning, BM had justin blood in it. Patient denied abdominal pain, dizziness.     This morning, patient was agreeable to receive the procedure, without hesitation. Need for the procedure, and of the need to sign the consent for it were reiterated. Patient continues to complain of R flank pain. Denies chest pain or shortness of breath.     MEDICATIONS  (STANDING):  insulin lispro (HumaLOG) corrective regimen sliding scale   SubCutaneous three times a day before meals  insulin lispro (HumaLOG) corrective regimen sliding scale   SubCutaneous at bedtime  dextrose 5%. 1000 milliLiter(s) (50 mL/Hr) IV Continuous <Continuous>  dextrose 50% Injectable 12.5 Gram(s) IV Push once  dextrose 50% Injectable 25 Gram(s) IV Push once  dextrose 50% Injectable 25 Gram(s) IV Push once  aspirin enteric coated 81 milliGRAM(s) Oral daily  tamsulosin 0.4 milliGRAM(s) Oral at bedtime  isosorbide   mononitrate ER Tablet (IMDUR) 30 milliGRAM(s) Oral daily  ranolazine 1000 milliGRAM(s) Oral two times a day  clopidogrel Tablet 75 milliGRAM(s) Oral daily  cefTRIAXone   IVPB 1 Gram(s) IV Intermittent every 24 hours  buDESOnide  80 MICROgram(s)/formoterol 4.5 MICROgram(s) Inhaler 2 Puff(s) Inhalation two times a day  busPIRone 5 milliGRAM(s) Oral two times a day  cefTRIAXone   IVPB      atorvastatin 80 milliGRAM(s) Oral at bedtime  metoprolol succinate  milliGRAM(s) Oral daily  heparin  Injectable 5000 Unit(s) SubCutaneous every 8 hours  hydrALAZINE 10 milliGRAM(s) Oral three times a day  sevelamer hydrochloride 1600 milliGRAM(s) Oral three times a day with meals  sodium polystyrene sulfonate Suspension 15 Gram(s) Oral every 4 hours    MEDICATIONS  (PRN):  dextrose Gel 1 Dose(s) Oral once PRN Blood Glucose LESS THAN 70 milliGRAM(s)/deciliter  glucagon  Injectable 1 milliGRAM(s) IntraMuscular once PRN Glucose LESS THAN 70 milligrams/deciliter  ALBUTerol    90 MICROgram(s) HFA Inhaler 2 Puff(s) Inhalation every 6 hours PRN Shortness of Breath  acetaminophen   Tablet. 650 milliGRAM(s) Oral every 6 hours PRN pain  oxyCODONE    IR 5 milliGRAM(s) Oral every 4 hours PRN Severe Pain (7 - 10)      T(F): 98 (09-20 @ 05:03), Max: 98.3 (09-19 @ 20:25)  HR: 82 (09-20 @ 05:03) (70 - 82)  BP: 149/81 (09-20 @ 05:03) (112/69 - 149/81)  RR: 18 (09-20 @ 05:03) (17 - 18)  SpO2: 96% (09-20 @ 05:03) (93% - 98%)    CAPILLARY BLOOD GLUCOSE  137 (19 Sep 2017 21:02)  138 (19 Sep 2017 17:46)  205 (19 Sep 2017 11:32)  140 (19 Sep 2017 07:48)          I&O's Summary    19 Sep 2017 07:01  -  20 Sep 2017 07:00  --------------------------------------------------------  IN: 760 mL / OUT: 95 mL / NET: 665 mL        PHYSICAL EXAM:  GEN: Appears in no acute distress  HEENT: PERRLA, EOMI and accommodate, neck supple, no lymphadenopathy, no JVD  MOUTH: moist mucous membranes, no exudates or lesions   PULM: Clear to auscultation bilaterally, no wheezes, rales, rhonchi  CV: Regular rate and rhythm, S1S2, no murmurs, rubs or gallops  ABD: Soft, nontender to palpation, non-distended, normoactive bowel sounds  EXTREMITIES: No edema, + peripheral pulses  NEURO: AAOx3, moving all four extremities spontaneously  SKIN: No rashes, lesions, hematomas, ecchymosis    LABS & IMAGING:  Labs reviewed personally.                        8.4    15.73 )-----------( 387      ( 20 Sep 2017 07:30 )             25.4     Hgb Trend: 8.4<--, 9.1<--, 8.8<--, 9.2<--, 9.4<--  09-20    131<L>  |  83<L>  |  94<H>  ----------------------------<  156<H>  5.3   |  17<L>  |  10.55<H>    Ca    9.3      20 Sep 2017 07:25  Phos  12.3     09-20  Mg     2.6     09-20      Creatinine Trend: 10.55<--, 9.28<--, 7.75<--, 7.46<--, 8.75<--, 5.05<--        Imaging reviewed personally. Patient is a 72y old  Male who presents with a chief complaint of s/p cardiac cath (12 Sep 2017 19:27)      SUBJECTIVE / OVERNIGHT EVENTS: Shiley in groin removed yesterday without complication. Patient refused replacement Shiley. Was extensively counseled on need for HD cath and emergent dialysis given his hyperkalemia. Was informed multiple times of his risk of sudden death. Patient's family was present. Patient showed capacity and reiterated that he did not want the cath despite his known risk of death. Patient was not agreeable to sign DNR at that time, though it was explained that he may not be able to be revived if he has a code from emergent hyperkalemia.     Patient was given kayexalate last night after K 6.4 result, had 3 BM o/n. This morning, BM had justin blood in it. Patient denied abdominal pain, dizziness.     This morning, patient was agreeable to receive the procedure, without hesitation. Need for the procedure, and of the need to sign the consent for it were reiterated. Patient continues to complain of R flank pain. Denies chest pain or shortness of breath.     MEDICATIONS  (STANDING):  insulin lispro (HumaLOG) corrective regimen sliding scale   SubCutaneous three times a day before meals  insulin lispro (HumaLOG) corrective regimen sliding scale   SubCutaneous at bedtime  dextrose 5%. 1000 milliLiter(s) (50 mL/Hr) IV Continuous <Continuous>  dextrose 50% Injectable 12.5 Gram(s) IV Push once  dextrose 50% Injectable 25 Gram(s) IV Push once  dextrose 50% Injectable 25 Gram(s) IV Push once  aspirin enteric coated 81 milliGRAM(s) Oral daily  tamsulosin 0.4 milliGRAM(s) Oral at bedtime  isosorbide   mononitrate ER Tablet (IMDUR) 30 milliGRAM(s) Oral daily  ranolazine 1000 milliGRAM(s) Oral two times a day  clopidogrel Tablet 75 milliGRAM(s) Oral daily  cefTRIAXone   IVPB 1 Gram(s) IV Intermittent every 24 hours  buDESOnide  80 MICROgram(s)/formoterol 4.5 MICROgram(s) Inhaler 2 Puff(s) Inhalation two times a day  busPIRone 5 milliGRAM(s) Oral two times a day  cefTRIAXone   IVPB      atorvastatin 80 milliGRAM(s) Oral at bedtime  metoprolol succinate  milliGRAM(s) Oral daily  heparin  Injectable 5000 Unit(s) SubCutaneous every 8 hours  hydrALAZINE 10 milliGRAM(s) Oral three times a day  sevelamer hydrochloride 1600 milliGRAM(s) Oral three times a day with meals  sodium polystyrene sulfonate Suspension 15 Gram(s) Oral every 4 hours    MEDICATIONS  (PRN):  dextrose Gel 1 Dose(s) Oral once PRN Blood Glucose LESS THAN 70 milliGRAM(s)/deciliter  glucagon  Injectable 1 milliGRAM(s) IntraMuscular once PRN Glucose LESS THAN 70 milligrams/deciliter  ALBUTerol    90 MICROgram(s) HFA Inhaler 2 Puff(s) Inhalation every 6 hours PRN Shortness of Breath  acetaminophen   Tablet. 650 milliGRAM(s) Oral every 6 hours PRN pain  oxyCODONE    IR 5 milliGRAM(s) Oral every 4 hours PRN Severe Pain (7 - 10)      T(F): 98 (09-20 @ 05:03), Max: 98.3 (09-19 @ 20:25)  HR: 82 (09-20 @ 05:03) (70 - 82)  BP: 149/81 (09-20 @ 05:03) (112/69 - 149/81)  RR: 18 (09-20 @ 05:03) (17 - 18)  SpO2: 96% (09-20 @ 05:03) (93% - 98%)    CAPILLARY BLOOD GLUCOSE  137 (19 Sep 2017 21:02)  138 (19 Sep 2017 17:46)  205 (19 Sep 2017 11:32)  140 (19 Sep 2017 07:48)          I&O's Summary    19 Sep 2017 07:01  -  20 Sep 2017 07:00  --------------------------------------------------------  IN: 760 mL / OUT: 95 mL / NET: 665 mL      PHYSICAL EXAM:  GEN: Appears in no acute distress  HEENT: PERRLA, EOMI and accommodate, neck supple, no lymphadenopathy, no JVD  MOUTH: moist mucous membranes, no exudates or lesions   PULM: bilateral crackles. Pt on RA at time of exam  CV: distant heart sounds, Regular rate and rhythm, S1S2, no murmurs, rubs or gallops  ABD: Soft, nontender to palpation, non-distended, normoactive bowel sounds  EXTREMITIES: No edema, +1 peripheral pulses, large ecchymosis on L side from impella  NEURO: AAOx3, moving all four extremities spontaneously  TUBES: L peripheral IV, james draining dark urine (casts? blood?) in it low volume, sammi KRAUSE femoral        LABS & IMAGING:  Labs reviewed personally.                        8.4    15.73 )-----------( 387      ( 20 Sep 2017 07:30 )             25.4     Hgb Trend: 8.4<--, 9.1<--, 8.8<--, 9.2<--, 9.4<--  09-20    131<L>  |  83<L>  |  94<H>  ----------------------------<  156<H>  5.3   |  17<L>  |  10.55<H>    Ca    9.3      20 Sep 2017 07:25  Phos  12.3     09-20  Mg     2.6     09-20      Creatinine Trend: 10.55<--, 9.28<--, 7.75<--, 7.46<--, 8.75<--, 5.05<--        Imaging reviewed personally.

## 2017-09-21 DIAGNOSIS — I48.91 UNSPECIFIED ATRIAL FIBRILLATION: ICD-10-CM

## 2017-09-21 LAB
ALBUMIN SERPL ELPH-MCNC: 3 G/DL — LOW (ref 3.3–5)
ALBUMIN SERPL ELPH-MCNC: 3.3 G/DL — SIGNIFICANT CHANGE UP (ref 3.3–5)
ALP SERPL-CCNC: 84 U/L — SIGNIFICANT CHANGE UP (ref 40–120)
ALP SERPL-CCNC: 86 U/L — SIGNIFICANT CHANGE UP (ref 40–120)
ALT FLD-CCNC: 17 U/L RC — SIGNIFICANT CHANGE UP (ref 10–45)
ALT FLD-CCNC: 20 U/L RC — SIGNIFICANT CHANGE UP (ref 10–45)
ANION GAP SERPL CALC-SCNC: 18 MMOL/L — HIGH (ref 5–17)
ANION GAP SERPL CALC-SCNC: 23 MMOL/L — HIGH (ref 5–17)
APTT BLD: 190.9 SEC — CRITICAL HIGH (ref 27.5–37.4)
APTT BLD: 36.7 SEC — SIGNIFICANT CHANGE UP (ref 27.5–37.4)
AST SERPL-CCNC: 20 U/L — SIGNIFICANT CHANGE UP (ref 10–40)
AST SERPL-CCNC: 25 U/L — SIGNIFICANT CHANGE UP (ref 10–40)
BASOPHILS # BLD AUTO: 0 K/UL — SIGNIFICANT CHANGE UP (ref 0–0.2)
BASOPHILS NFR BLD AUTO: 0.1 % — SIGNIFICANT CHANGE UP (ref 0–2)
BILIRUB SERPL-MCNC: 0.5 MG/DL — SIGNIFICANT CHANGE UP (ref 0.2–1.2)
BILIRUB SERPL-MCNC: 0.5 MG/DL — SIGNIFICANT CHANGE UP (ref 0.2–1.2)
BUN SERPL-MCNC: 27 MG/DL — HIGH (ref 7–23)
BUN SERPL-MCNC: 86 MG/DL — HIGH (ref 7–23)
CALCIUM SERPL-MCNC: 8.9 MG/DL — SIGNIFICANT CHANGE UP (ref 8.4–10.5)
CALCIUM SERPL-MCNC: 8.9 MG/DL — SIGNIFICANT CHANGE UP (ref 8.4–10.5)
CHLORIDE SERPL-SCNC: 89 MMOL/L — LOW (ref 96–108)
CHLORIDE SERPL-SCNC: 96 MMOL/L — SIGNIFICANT CHANGE UP (ref 96–108)
CK MB CFR SERPL CALC: 3.6 NG/ML — SIGNIFICANT CHANGE UP (ref 0–6.7)
CK SERPL-CCNC: 54 U/L — SIGNIFICANT CHANGE UP (ref 30–200)
CK SERPL-CCNC: 76 U/L — SIGNIFICANT CHANGE UP (ref 30–200)
CO2 SERPL-SCNC: 22 MMOL/L — SIGNIFICANT CHANGE UP (ref 22–31)
CO2 SERPL-SCNC: 22 MMOL/L — SIGNIFICANT CHANGE UP (ref 22–31)
CREAT SERPL-MCNC: 3.1 MG/DL — HIGH (ref 0.5–1.3)
CREAT SERPL-MCNC: 8.92 MG/DL — HIGH (ref 0.5–1.3)
EOSINOPHIL # BLD AUTO: 0.1 K/UL — SIGNIFICANT CHANGE UP (ref 0–0.5)
EOSINOPHIL NFR BLD AUTO: 1 % — SIGNIFICANT CHANGE UP (ref 0–6)
GAS PNL BLDA: SIGNIFICANT CHANGE UP
GLUCOSE SERPL-MCNC: 116 MG/DL — HIGH (ref 70–99)
GLUCOSE SERPL-MCNC: 201 MG/DL — HIGH (ref 70–99)
HCT VFR BLD CALC: 24.5 % — LOW (ref 39–50)
HCT VFR BLD CALC: 25.9 % — LOW (ref 39–50)
HCT VFR BLD CALC: 26.4 % — LOW (ref 39–50)
HGB BLD-MCNC: 8.4 G/DL — LOW (ref 13–17)
HGB BLD-MCNC: 8.9 G/DL — LOW (ref 13–17)
HGB BLD-MCNC: 8.9 G/DL — LOW (ref 13–17)
INR BLD: 1.41 RATIO — HIGH (ref 0.88–1.16)
INR BLD: 1.44 RATIO — HIGH (ref 0.88–1.16)
LYMPHOCYTES # BLD AUTO: 1.2 K/UL — SIGNIFICANT CHANGE UP (ref 1–3.3)
LYMPHOCYTES # BLD AUTO: 11 % — LOW (ref 13–44)
MAGNESIUM SERPL-MCNC: 2.4 MG/DL — SIGNIFICANT CHANGE UP (ref 1.6–2.6)
MCHC RBC-ENTMCNC: 29.1 PG — SIGNIFICANT CHANGE UP (ref 27–34)
MCHC RBC-ENTMCNC: 29.2 PG — SIGNIFICANT CHANGE UP (ref 27–34)
MCHC RBC-ENTMCNC: 29.4 PG — SIGNIFICANT CHANGE UP (ref 27–34)
MCHC RBC-ENTMCNC: 33.6 GM/DL — SIGNIFICANT CHANGE UP (ref 32–36)
MCHC RBC-ENTMCNC: 34.1 GM/DL — SIGNIFICANT CHANGE UP (ref 32–36)
MCHC RBC-ENTMCNC: 34.3 GM/DL — SIGNIFICANT CHANGE UP (ref 32–36)
MCV RBC AUTO: 85.6 FL — SIGNIFICANT CHANGE UP (ref 80–100)
MCV RBC AUTO: 85.7 FL — SIGNIFICANT CHANGE UP (ref 80–100)
MCV RBC AUTO: 86.5 FL — SIGNIFICANT CHANGE UP (ref 80–100)
MONOCYTES # BLD AUTO: 0.7 K/UL — SIGNIFICANT CHANGE UP (ref 0–0.9)
MONOCYTES NFR BLD AUTO: 6.5 % — SIGNIFICANT CHANGE UP (ref 2–14)
NEUTROPHILS # BLD AUTO: 8.9 K/UL — HIGH (ref 1.8–7.4)
NEUTROPHILS NFR BLD AUTO: 81.4 % — HIGH (ref 43–77)
PHOSPHATE SERPL-MCNC: 10.3 MG/DL — HIGH (ref 2.5–4.5)
PLATELET # BLD AUTO: 288 K/UL — SIGNIFICANT CHANGE UP (ref 150–400)
PLATELET # BLD AUTO: 296 K/UL — SIGNIFICANT CHANGE UP (ref 150–400)
PLATELET # BLD AUTO: 341 K/UL — SIGNIFICANT CHANGE UP (ref 150–400)
POTASSIUM SERPL-MCNC: 3.5 MMOL/L — SIGNIFICANT CHANGE UP (ref 3.5–5.3)
POTASSIUM SERPL-MCNC: 3.6 MMOL/L — SIGNIFICANT CHANGE UP (ref 3.5–5.3)
POTASSIUM SERPL-SCNC: 3.5 MMOL/L — SIGNIFICANT CHANGE UP (ref 3.5–5.3)
POTASSIUM SERPL-SCNC: 3.6 MMOL/L — SIGNIFICANT CHANGE UP (ref 3.5–5.3)
PROT SERPL-MCNC: 7.3 G/DL — SIGNIFICANT CHANGE UP (ref 6–8.3)
PROT SERPL-MCNC: 7.4 G/DL — SIGNIFICANT CHANGE UP (ref 6–8.3)
PROTHROM AB SERPL-ACNC: 15.5 SEC — HIGH (ref 9.8–12.7)
PROTHROM AB SERPL-ACNC: 15.7 SEC — HIGH (ref 9.8–12.7)
RBC # BLD: 2.86 M/UL — LOW (ref 4.2–5.8)
RBC # BLD: 3.02 M/UL — LOW (ref 4.2–5.8)
RBC # BLD: 3.05 M/UL — LOW (ref 4.2–5.8)
RBC # FLD: 12.3 % — SIGNIFICANT CHANGE UP (ref 10.3–14.5)
SODIUM SERPL-SCNC: 134 MMOL/L — LOW (ref 135–145)
SODIUM SERPL-SCNC: 136 MMOL/L — SIGNIFICANT CHANGE UP (ref 135–145)
TROPONIN T SERPL-MCNC: 1.96 NG/ML — HIGH (ref 0–0.06)
TROPONIN T SERPL-MCNC: 2.06 NG/ML — HIGH (ref 0–0.06)
WBC # BLD: 10.9 K/UL — HIGH (ref 3.8–10.5)
WBC # BLD: 12.3 K/UL — HIGH (ref 3.8–10.5)
WBC # BLD: 12.7 K/UL — HIGH (ref 3.8–10.5)
WBC # FLD AUTO: 10.9 K/UL — HIGH (ref 3.8–10.5)
WBC # FLD AUTO: 12.3 K/UL — HIGH (ref 3.8–10.5)
WBC # FLD AUTO: 12.7 K/UL — HIGH (ref 3.8–10.5)

## 2017-09-21 PROCEDURE — 99232 SBSQ HOSP IP/OBS MODERATE 35: CPT

## 2017-09-21 PROCEDURE — 99233 SBSQ HOSP IP/OBS HIGH 50: CPT | Mod: GC

## 2017-09-21 PROCEDURE — 76775 US EXAM ABDO BACK WALL LIM: CPT | Mod: 26

## 2017-09-21 PROCEDURE — 93010 ELECTROCARDIOGRAM REPORT: CPT | Mod: 76

## 2017-09-21 PROCEDURE — 93010 ELECTROCARDIOGRAM REPORT: CPT | Mod: 77

## 2017-09-21 PROCEDURE — 71010: CPT | Mod: 26

## 2017-09-21 RX ORDER — HEPARIN SODIUM 5000 [USP'U]/ML
5000 INJECTION INTRAVENOUS; SUBCUTANEOUS EVERY 8 HOURS
Qty: 0 | Refills: 0 | Status: DISCONTINUED | OUTPATIENT
Start: 2017-09-21 | End: 2017-09-22

## 2017-09-21 RX ORDER — PANTOPRAZOLE SODIUM 20 MG/1
40 TABLET, DELAYED RELEASE ORAL ONCE
Qty: 0 | Refills: 0 | Status: COMPLETED | OUTPATIENT
Start: 2017-09-21 | End: 2017-09-22

## 2017-09-21 RX ORDER — PROPOFOL 10 MG/ML
10 INJECTION, EMULSION INTRAVENOUS
Qty: 500 | Refills: 0 | Status: DISCONTINUED | OUTPATIENT
Start: 2017-09-21 | End: 2017-09-22

## 2017-09-21 RX ORDER — NOREPINEPHRINE BITARTRATE/D5W 8 MG/250ML
0.05 PLASTIC BAG, INJECTION (ML) INTRAVENOUS
Qty: 8 | Refills: 0 | Status: DISCONTINUED | OUTPATIENT
Start: 2017-09-21 | End: 2017-09-22

## 2017-09-21 RX ADMIN — Medication 5 MILLIGRAM(S): at 05:23

## 2017-09-21 RX ADMIN — Medication 10 MILLIGRAM(S): at 05:23

## 2017-09-21 RX ADMIN — Medication 10 MILLIGRAM(S): at 22:10

## 2017-09-21 RX ADMIN — Medication 5 MILLIGRAM(S): at 17:03

## 2017-09-21 RX ADMIN — CLOPIDOGREL BISULFATE 75 MILLIGRAM(S): 75 TABLET, FILM COATED ORAL at 11:44

## 2017-09-21 RX ADMIN — Medication 10 MILLIGRAM(S): at 14:37

## 2017-09-21 RX ADMIN — HEPARIN SODIUM 5000 UNIT(S): 5000 INJECTION INTRAVENOUS; SUBCUTANEOUS at 22:10

## 2017-09-21 RX ADMIN — SEVELAMER CARBONATE 1600 MILLIGRAM(S): 2400 POWDER, FOR SUSPENSION ORAL at 12:27

## 2017-09-21 RX ADMIN — ISOSORBIDE MONONITRATE 30 MILLIGRAM(S): 60 TABLET, EXTENDED RELEASE ORAL at 11:47

## 2017-09-21 RX ADMIN — RANOLAZINE 1000 MILLIGRAM(S): 500 TABLET, FILM COATED, EXTENDED RELEASE ORAL at 05:24

## 2017-09-21 RX ADMIN — BUDESONIDE AND FORMOTEROL FUMARATE DIHYDRATE 2 PUFF(S): 160; 4.5 AEROSOL RESPIRATORY (INHALATION) at 17:04

## 2017-09-21 RX ADMIN — RANOLAZINE 1000 MILLIGRAM(S): 500 TABLET, FILM COATED, EXTENDED RELEASE ORAL at 17:54

## 2017-09-21 RX ADMIN — TAMSULOSIN HYDROCHLORIDE 0.4 MILLIGRAM(S): 0.4 CAPSULE ORAL at 22:10

## 2017-09-21 RX ADMIN — BUDESONIDE AND FORMOTEROL FUMARATE DIHYDRATE 2 PUFF(S): 160; 4.5 AEROSOL RESPIRATORY (INHALATION) at 05:24

## 2017-09-21 RX ADMIN — Medication 3: at 12:26

## 2017-09-21 RX ADMIN — SEVELAMER CARBONATE 1600 MILLIGRAM(S): 2400 POWDER, FOR SUSPENSION ORAL at 17:03

## 2017-09-21 RX ADMIN — Medication 150 MILLIGRAM(S): at 05:24

## 2017-09-21 RX ADMIN — Medication 81 MILLIGRAM(S): at 11:44

## 2017-09-21 RX ADMIN — CEFTRIAXONE 100 GRAM(S): 500 INJECTION, POWDER, FOR SOLUTION INTRAMUSCULAR; INTRAVENOUS at 22:11

## 2017-09-21 RX ADMIN — ATORVASTATIN CALCIUM 80 MILLIGRAM(S): 80 TABLET, FILM COATED ORAL at 22:10

## 2017-09-21 NOTE — PROGRESS NOTE ADULT - PROBLEM SELECTOR PLAN 9
- c/w HSQ as patient very high risk, if HGB continues to downtrend will discontinue - Symbicort BID  - albuterol PRN  - nasal cannula O2 PRN  - if very dyspneic, will start Bipap

## 2017-09-21 NOTE — PROGRESS NOTE ADULT - PROBLEM SELECTOR PROBLEM 7
Type 2 diabetes mellitus without complication, unspecified long term insulin use status Essential hypertension

## 2017-09-21 NOTE — PROGRESS NOTE ADULT - PROBLEM SELECTOR PLAN 7
A1C 6.5. Patient on sitagliptin + metformin at home.  - low SSI  - FS at goal Patient with h/o HTN  - c/w procardia, imdur, metoprolol. Start ACE-I when deemed ESRD.

## 2017-09-21 NOTE — PROGRESS NOTE ADULT - SUBJECTIVE AND OBJECTIVE BOX
Subjective    Patient seen and examined. Noted to have intermittent A fib. Tolerating dialysis. No fevers. Denies flank pain. UOP improved.     Objective    Vital signs  T(F): , Max: 99.2 (09-21-17 @ 18:20)  HR: 81 (09-21-17 @ 18:20)  BP: 122/71 (09-21-17 @ 18:20)  SpO2: 100% (09-21-17 @ 18:20)  Wt(kg): --    Output     09-20 @ 07:01  -  09-21 @ 07:00  --------------------------------------------------------  IN: 1560 mL / OUT: 1834 mL / NET: -274 mL    09-21 @ 07:01  -  09-21 @ 19:36  --------------------------------------------------------  IN: 790 mL / OUT: 1000 mL / NET: -210 mL        General: NAD  Abdomen: soft/non-tender/non-distended  : james clear yellow, mild sediment, draining well     Labs      09-21 @ 03:59    WBC 10.9  / Hct 25.9  / SCr 8.92     09-21 @ 01:17    WBC 12.3  / Hct 24.5  / SCr --         < from: US Renal (09.21.17 @ 15:59) >  IMPRESSION:     No hydronephrosis bilaterally.    Atrophic appearing left kidney as was seen on CT examination and prior   sonography dated September 14, 2017    < end of copied text >  Imaging

## 2017-09-21 NOTE — PROGRESS NOTE ADULT - SUBJECTIVE AND OBJECTIVE BOX
infectious diseases progress note:    Patient is a 72y old  Male who presents with a chief complaint of s/p cardiac cath (12 Sep 2017 19:27)        Non-ST elevation (NSTEMI) myocardial infarction        ROS:  CONSTITUTIONAL:  Negative fever or chills, feels well, good appetite  EYES:  Negative  blurry vision or double vision  CARDIOVASCULAR:  Negative for chest pain or palpitations  RESPIRATORY:  Negative for cough, wheezing, or SOB   GASTROINTESTINAL:  Negative for nausea, vomiting, diarrhea, constipation, or abdominal pain  GENITOURINARY:  Negative frequency, urgency or dysuria  NEUROLOGIC:  No headache, confusion, dizziness, lightheadedness    Allergies    No Known Allergies    Intolerances        ANTIBIOTICS/RELEVANT:  antimicrobials  cefTRIAXone   IVPB 1 Gram(s) IV Intermittent every 24 hours  cefTRIAXone   IVPB        immunologic:    OTHER:  insulin lispro (HumaLOG) corrective regimen sliding scale   SubCutaneous three times a day before meals  insulin lispro (HumaLOG) corrective regimen sliding scale   SubCutaneous at bedtime  dextrose 5%. 1000 milliLiter(s) IV Continuous <Continuous>  dextrose Gel 1 Dose(s) Oral once PRN  dextrose 50% Injectable 12.5 Gram(s) IV Push once  dextrose 50% Injectable 25 Gram(s) IV Push once  dextrose 50% Injectable 25 Gram(s) IV Push once  glucagon  Injectable 1 milliGRAM(s) IntraMuscular once PRN  aspirin enteric coated 81 milliGRAM(s) Oral daily  tamsulosin 0.4 milliGRAM(s) Oral at bedtime  isosorbide   mononitrate ER Tablet (IMDUR) 30 milliGRAM(s) Oral daily  ranolazine 1000 milliGRAM(s) Oral two times a day  clopidogrel Tablet 75 milliGRAM(s) Oral daily  buDESOnide  80 MICROgram(s)/formoterol 4.5 MICROgram(s) Inhaler 2 Puff(s) Inhalation two times a day  ALBUTerol    90 MICROgram(s) HFA Inhaler 2 Puff(s) Inhalation every 6 hours PRN  busPIRone 5 milliGRAM(s) Oral two times a day  acetaminophen   Tablet. 650 milliGRAM(s) Oral every 6 hours PRN  atorvastatin 80 milliGRAM(s) Oral at bedtime  metoprolol succinate  milliGRAM(s) Oral daily  hydrALAZINE 10 milliGRAM(s) Oral three times a day  sevelamer hydrochloride 1600 milliGRAM(s) Oral three times a day with meals  oxyCODONE    IR 5 milliGRAM(s) Oral every 4 hours PRN      Objective:  Vital Signs Last 24 Hrs  T(C): 36.4 (21 Sep 2017 04:35), Max: 37 (21 Sep 2017 02:52)  T(F): 97.6 (21 Sep 2017 04:35), Max: 98.6 (21 Sep 2017 02:52)  HR: 103 (21 Sep 2017 04:35) (74 - 110)  BP: 139/77 (21 Sep 2017 04:35) (112/69 - 144/79)  BP(mean): --  RR: 18 (21 Sep 2017 04:35) (17 - 20)  SpO2: 98% (21 Sep 2017 04:35) (93% - 100%)    PHYSICAL EXAM:  Constitutional:Well-developed, well nourished--no acute distress  Eyes:CHARLINE, EOMI  Ear/Nose/Throat: no oral lesion, no sinus tenderness on percussion	  Neck:no JVD, no lymphadenopathy, supple  Respiratory: CTA susan  Cardiovascular: S1S2 RRR, no murmurs  Gastrointestinal:soft, (+) BS, no HSM  Extremities:no e/e/c        LABS:                        8.9    10.9  )-----------( 296      ( 21 Sep 2017 03:59 )             25.9     09-21    134<L>  |  89<L>  |  86<H>  ----------------------------<  116<H>  3.5   |  22  |  8.92<H>    Ca    8.9      21 Sep 2017 03:59  Phos  10.3     09-21  Mg     2.4     09-21    TPro  7.3  /  Alb  3.0<L>  /  TBili  0.5  /  DBili  x   /  AST  20  /  ALT  17  /  AlkPhos  84  09-21    PT/INR - ( 21 Sep 2017 03:59 )   PT: 15.5 sec;   INR: 1.41 ratio         PTT - ( 21 Sep 2017 03:59 )  PTT:36.7 sec        MICROBIOLOGY:    RECENT CULTURES:        RESPIRATORY CULTURES:              RADIOLOGY & ADDITIONAL STUDIES:        Pager [phone]  After 5 pm/weekends or if no response :[phone]

## 2017-09-21 NOTE — PROGRESS NOTE ADULT - SUBJECTIVE AND OBJECTIVE BOX
24H hour events: Yesterday, while at HD developed atrial fibrillation. Had temp HD access changed via IR from R fem to R IJ    MEDICATIONS:  aspirin enteric coated 81 milliGRAM(s) Oral daily  tamsulosin 0.4 milliGRAM(s) Oral at bedtime  isosorbide   mononitrate ER Tablet (IMDUR) 30 milliGRAM(s) Oral daily  ranolazine 1000 milliGRAM(s) Oral two times a day  clopidogrel Tablet 75 milliGRAM(s) Oral daily  metoprolol succinate  milliGRAM(s) Oral daily  hydrALAZINE 10 milliGRAM(s) Oral three times a day    cefTRIAXone   IVPB 1 Gram(s) IV Intermittent every 24 hours  cefTRIAXone   IVPB        buDESOnide  80 MICROgram(s)/formoterol 4.5 MICROgram(s) Inhaler 2 Puff(s) Inhalation two times a day  ALBUTerol    90 MICROgram(s) HFA Inhaler 2 Puff(s) Inhalation every 6 hours PRN    busPIRone 5 milliGRAM(s) Oral two times a day  acetaminophen   Tablet. 650 milliGRAM(s) Oral every 6 hours PRN  oxyCODONE    IR 5 milliGRAM(s) Oral every 4 hours PRN      insulin lispro (HumaLOG) corrective regimen sliding scale   SubCutaneous three times a day before meals  insulin lispro (HumaLOG) corrective regimen sliding scale   SubCutaneous at bedtime  dextrose Gel 1 Dose(s) Oral once PRN  dextrose 50% Injectable 12.5 Gram(s) IV Push once  dextrose 50% Injectable 25 Gram(s) IV Push once  dextrose 50% Injectable 25 Gram(s) IV Push once  glucagon  Injectable 1 milliGRAM(s) IntraMuscular once PRN  atorvastatin 80 milliGRAM(s) Oral at bedtime    dextrose 5%. 1000 milliLiter(s) IV Continuous <Continuous>      REVIEW OF SYSTEMS:  Denies CP, SOB, orthopnea, pnd, LH.  Complete 10point ROS negative.    PHYSICAL EXAM:  T(C): 36.8 (09-21-17 @ 07:55), Max: 37 (09-21-17 @ 02:52)  HR: 100 (09-21-17 @ 07:55) (74 - 110)  BP: 114/75 (09-21-17 @ 07:55) (112/69 - 144/79)  RR: 18 (09-21-17 @ 07:55) (17 - 20)  SpO2: 99% (09-21-17 @ 07:55) (93% - 100%)  Wt(kg): --  I&O's Summary    20 Sep 2017 07:01  -  21 Sep 2017 07:00  --------------------------------------------------------  IN: 1560 mL / OUT: 1834 mL / NET: -274 mL      Appearance: Normal	  HEENT:   Normal oral mucosa, PERRL, EOMI	  Lymphatic: No lymphadenopathy  Cardiovascular: Normal S1 S2, No JVD, No murmurs, No edema  left fem impella site large echymosis, spreading, nontender, no palp hematoma  right fem temp HD nontender, small echymosis, no hematoma  distal pulses doplerable  Respiratory: Lungs clear to auscultation	  Psychiatry: A & O x 3, Mood & affect appropriate  Gastrointestinal:  Soft, Non-tender, + BS	  Skin: No rashes, No ecchymoses, No cyanosis	  Neurologic: Non-focal  Extremities: Normal range of motion, No clubbing, cyanosis or edema  Vascular: Peripheral pulses palpable 2+ bilaterally      LABS:	 	    CBC Full  -  ( 21 Sep 2017 03:59 )  WBC Count : 10.9 K/uL  Hemoglobin : 8.9 g/dL  Hematocrit : 25.9 %  Platelet Count - Automated : 296 K/uL  Mean Cell Volume : 85.7 fl  Mean Cell Hemoglobin : 29.4 pg  Mean Cell Hemoglobin Concentration : 34.3 gm/dL  Auto Neutrophil # : 8.9 K/uL  Auto Lymphocyte # : 1.2 K/uL  Auto Monocyte # : 0.7 K/uL  Auto Eosinophil # : 0.1 K/uL  Auto Basophil # : 0.0 K/uL  Auto Neutrophil % : 81.4 %  Auto Lymphocyte % : 11.0 %  Auto Monocyte % : 6.5 %  Auto Eosinophil % : 1.0 %  Auto Basophil % : 0.1 %    09-21    134<L>  |  89<L>  |  86<H>  ----------------------------<  116<H>  3.5   |  22  |  8.92<H>  09-20    136  |  88<L>  |  85<H>  ----------------------------<  160<H>  3.9   |  22  |  8.96<H>    Ca    8.9      21 Sep 2017 03:59  Ca    8.7      20 Sep 2017 15:02  Phos  10.3     09-21  Phos  10.6     09-20  Mg     2.4     09-21  Mg     2.5     09-20    TPro  7.3  /  Alb  3.0<L>  /  TBili  0.5  /  DBili  x   /  AST  20  /  ALT  17  /  AlkPhos  84  09-21    TELEMETRY: a fib/flutter 70-120s      	  ASSESSMENT/PLAN: 	    72 year old man w/ CAD with multiple stent, DM2, HTN, HLD, COPD, LIN on CKD on HD, initially presented with pyelonephritis, course c/b LIN on CKD, c/b NSTEMI s/p impella assisted BABAR x 5 (LAD and RCA) on 9/14/2017     1. NSTEMI - s/p PCI  -cont DAPT, cont statin 80mg daily  **please maintain on dual antiplatelets in light of recent coronary stenting unless life threatening bleed or high risk procedure  **anesthesia request preop eval - rcri score 3 (11% risk major cardiac event) patient is high risk; please cont periop beta blocker  -cont bblocker, ranexa  -lifevest on d/c    2. HFrEF -   -cont metoprolol succinate 150mg daily, imdur  -cont hydralazine 10mg tid; inc as tolerated  -eventual ACE/ARB when renal function stabilizes or committed to HD - likely outpatient  -maintain euvol via HD    3. new atrial fibrillation  -please check EKG today  -rate control goal HR <110 via metoprolol  -chadsvasc 4 - will require systemic therapeutic anticoagulation for cva ppx when stable from medical perspective 24H hour events: No acute events.    MEDICATIONS:  aspirin enteric coated 81 milliGRAM(s) Oral daily  tamsulosin 0.4 milliGRAM(s) Oral at bedtime  isosorbide   mononitrate ER Tablet (IMDUR) 30 milliGRAM(s) Oral daily  ranolazine 1000 milliGRAM(s) Oral two times a day  clopidogrel Tablet 75 milliGRAM(s) Oral daily  NIFEdipine XL 60 milliGRAM(s) Oral daily  metoprolol succinate  milliGRAM(s) Oral daily  heparin  Injectable 5000 Unit(s) SubCutaneous every 8 hours    cefTRIAXone   IVPB 1 Gram(s) IV Intermittent every 24 hours  cefTRIAXone   IVPB        buDESOnide  80 MICROgram(s)/formoterol 4.5 MICROgram(s) Inhaler 2 Puff(s) Inhalation two times a day  ALBUTerol    90 MICROgram(s) HFA Inhaler 2 Puff(s) Inhalation every 6 hours PRN    busPIRone 5 milliGRAM(s) Oral two times a day  acetaminophen   Tablet. 650 milliGRAM(s) Oral every 6 hours PRN  oxyCODONE    IR 5 milliGRAM(s) Oral every 4 hours PRN      insulin lispro (HumaLOG) corrective regimen sliding scale   SubCutaneous three times a day before meals  insulin lispro (HumaLOG) corrective regimen sliding scale   SubCutaneous at bedtime  dextrose Gel 1 Dose(s) Oral once PRN  dextrose 50% Injectable 12.5 Gram(s) IV Push once  dextrose 50% Injectable 25 Gram(s) IV Push once  dextrose 50% Injectable 25 Gram(s) IV Push once  glucagon  Injectable 1 milliGRAM(s) IntraMuscular once PRN  atorvastatin 80 milliGRAM(s) Oral at bedtime    dextrose 5%. 1000 milliLiter(s) IV Continuous <Continuous>      REVIEW OF SYSTEMS:  Denies CP, SOB, orthopnea. Complains of R flank pain  Remaining 10point ROS negative.    PHYSICAL EXAM:  T(C): 36.7 (09-18-17 @ 04:15), Max: 36.9 (09-17-17 @ 17:20)  HR: 78 (09-18-17 @ 05:02) (72 - 78)  BP: 118/69 (09-18-17 @ 05:02) (105/65 - 133/70)  RR: 19 (09-18-17 @ 04:15) (18 - 22)  SpO2: 94% (09-18-17 @ 04:15) (92% - 100%)  Wt(kg): --  I&O's Summary    17 Sep 2017 07:01  -  18 Sep 2017 07:00  --------------------------------------------------------  IN: 560 mL / OUT: 1280 mL / NET: -720 mL    18 Sep 2017 07:01  -  18 Sep 2017 10:17  --------------------------------------------------------  IN: 220 mL / OUT: 200 mL / NET: 20 mL    Appearance: Normal	  HEENT:   Normal oral mucosa, PERRL, EOMI	  Lymphatic: No lymphadenopathy  Cardiovascular: Normal S1 S2, No JVD, No murmurs, No edema  left femoral temp HD cath  right fem access small hematoma, nontender, distal pulses doplerable  Respiratory: Lungs clear to auscultation	  Psychiatry: A & O x 3, Mood & affect appropriate  Gastrointestinal:  Soft, Non-tender, + BS	  Skin: No rashes, No ecchymoses, No cyanosis	  Neurologic: Non-focal  Extremities: Normal range of motion, No clubbing, cyanosis or edema  Vascular: Peripheral pulses palpable 2+ bilaterally    LABS:	 	    CBC Full  -  ( 18 Sep 2017 07:16 )  WBC Count : 13.69 K/uL  Hemoglobin : 8.8 g/dL  Hematocrit : 26.1 %  Platelet Count - Automated : 314 K/uL  Mean Cell Volume : 80.3 fl  Mean Cell Hemoglobin : 27.1 pg  Mean Cell Hemoglobin Concentration : 33.7 gm/dL  Auto Neutrophil # : x  Auto Lymphocyte # : x  Auto Monocyte # : x  Auto Eosinophil # : x  Auto Basophil # : x  Auto Neutrophil % : x  Auto Lymphocyte % : x  Auto Monocyte % : x  Auto Eosinophil % : x  Auto Basophil % : x    09-17    135  |  90<L>  |  48<H>  ----------------------------<  97  3.5   |  22  |  5.05<H>  09-17    134<L>  |  90<L>  |  72<H>  ----------------------------<  127<H>  5.0   |  20<L>  |  7.22<H>    Ca    9.3      17 Sep 2017 21:48  Ca    9.7      17 Sep 2017 20:32  Phos  10.9     09-17  Mg     2.7     09-17    TPro  7.4  /  Alb  3.4  /  TBili  0.5  /  DBili  x   /  AST  29  /  ALT  23  /  AlkPhos  106  09-17    TELEMETRY: sinus 70-80      < from: TTE with Doppler (w/Cont) (09.13.17 @ 22:23) >  Dimensions:    Normal Values:  LA:     4.4    2.0 - 4.0 cm  Ao:            2.0 - 3.8 cm  SEPTUM: 1.2    0.6 - 1.2 cm  PWT:    1.0    0.6 - 1.1 cm  LVIDd:  5.2    3.0 - 5.6 cm  LVIDs:  4.2    1.8 - 4.0 cm  Derived variables:  LVMI: 113 g/m2  RWT: 0.38  Fractional short: 19 %  EF (Visual Estimate): 35 %  Doppler Peak Velocity (m/sec): AoV=1.9    < end of copied text >  < from: TTE with Doppler (w/Cont) (09.13.17 @ 22:23) >  ------------------------------------------------------------------------  Conclusions:  1. Mild mitral annular calcification. Thickened and  tethered mitral valve leaflets with normal opening.  Mild-moderate mitral regurgitation.  2. Aortic valve not well visualized; appears calcified.  Peak transaortic valve gradient equals 14 mm Hg, mean  transaortic valve gradient equals 7 mm Hg, estimated aortic  valve area equals 1.4 sqcm (by continuity equation), aortic  valve velocity time integral equals 41 cm, consistent with  moderate aortic stenosis. No aortic valve regurgitation  seen.  3. Mild left ventricular enlargement. Eccentric left  ventricular hypertrophy (dilated left ventricle with normal  relative wall thickness).  4. Endocardial visualization enhanced with intravenous  injection of echo contrast (Definity). Moderate-severe  segmental left ventricular systolic dysfunction. The mid to  distal septum, mid to distal inferior, and the apical cap  are akinetic.  No left ventricular thrombus.  5. Normal right ventricular size and systolic function.  6. Thickened pericardium inferior to the right heart. Trace  pericardial effusion adjacent to the right heart. No  evidence of hemodynamic compromise.  *** No previous Echo exam.  ------------------------------------------------------------------------    < end of copied text >    < from: Cardiac Cath Lab - Adult (09.12.17 @ 18:38) >  PROCEDURE:  --  Left heart catheterization.  --  Left coronary angiography.  --  Right coronary angiography.    < end of copied text >  	    ASSESSMENT/PLAN: 	    72 year old man w/ CAD with multiple stent, DM2, HTN, HLD, COPD, LIN on CKD on HD, initially presented with pyelonephritis, course c/b LIN on CKD, c/b NSTEMI s/p impella assisted BABAR x 5 (LAD and RCA) on 9/14/2017       1. NSTEMI - s/p PCI  -cont DAPT, cont statin 80mg daily  **to whom it may concern, please maintain on dual antiplatelets in light of recent coronary stenting unless life threatening bleed or high risk procedure  **anesthesia request preop eval - rcri score 3 (11% risk major cardiac event) patient is high risk; please cont periop beta blocker  -cont bblocker, ranexa  -lifevest eval    2. HFrEF -   -cont metoprolol succinate 150mg daily, imdur  **please add hydralazine 10mg tid; inc to 25mg tid if tolerates **titrate up hydralazine as titrate off nifedipine setting HFrEF  -eventual ACE/ARB when renal function stabilizes - likely outpatient  -continue fluid removal during HD  -monitor on tele    [phone] [id number]

## 2017-09-21 NOTE — PROGRESS NOTE ADULT - PROBLEM SELECTOR PROBLEM 8
Chronic obstructive pulmonary disease, unspecified COPD type Type 2 diabetes mellitus without complication, unspecified long term insulin use status

## 2017-09-21 NOTE — PROGRESS NOTE ADULT - SUBJECTIVE AND OBJECTIVE BOX
Patient is a 72y old  Male who presents with a chief complaint of s/p cardiac cath (12 Sep 2017 19:27)      SUBJECTIVE / OVERNIGHT EVENTS: No acute events overnight. Patient denies F/C, HA, CP, SOB, abdominal pain, N/V/D/C.     MEDICATIONS  (STANDING):  insulin lispro (HumaLOG) corrective regimen sliding scale   SubCutaneous three times a day before meals  insulin lispro (HumaLOG) corrective regimen sliding scale   SubCutaneous at bedtime  dextrose 5%. 1000 milliLiter(s) (50 mL/Hr) IV Continuous <Continuous>  dextrose 50% Injectable 12.5 Gram(s) IV Push once  dextrose 50% Injectable 25 Gram(s) IV Push once  dextrose 50% Injectable 25 Gram(s) IV Push once  aspirin enteric coated 81 milliGRAM(s) Oral daily  tamsulosin 0.4 milliGRAM(s) Oral at bedtime  isosorbide   mononitrate ER Tablet (IMDUR) 30 milliGRAM(s) Oral daily  ranolazine 1000 milliGRAM(s) Oral two times a day  clopidogrel Tablet 75 milliGRAM(s) Oral daily  cefTRIAXone   IVPB 1 Gram(s) IV Intermittent every 24 hours  buDESOnide  80 MICROgram(s)/formoterol 4.5 MICROgram(s) Inhaler 2 Puff(s) Inhalation two times a day  busPIRone 5 milliGRAM(s) Oral two times a day  cefTRIAXone   IVPB      atorvastatin 80 milliGRAM(s) Oral at bedtime  metoprolol succinate  milliGRAM(s) Oral daily  hydrALAZINE 10 milliGRAM(s) Oral three times a day  sevelamer hydrochloride 1600 milliGRAM(s) Oral three times a day with meals    MEDICATIONS  (PRN):  dextrose Gel 1 Dose(s) Oral once PRN Blood Glucose LESS THAN 70 milliGRAM(s)/deciliter  glucagon  Injectable 1 milliGRAM(s) IntraMuscular once PRN Glucose LESS THAN 70 milligrams/deciliter  ALBUTerol    90 MICROgram(s) HFA Inhaler 2 Puff(s) Inhalation every 6 hours PRN Shortness of Breath  acetaminophen   Tablet. 650 milliGRAM(s) Oral every 6 hours PRN pain  oxyCODONE    IR 5 milliGRAM(s) Oral every 4 hours PRN Severe Pain (7 - 10)      T(F): 97.6 (09-21 @ 04:35), Max: 98.6 (09-21 @ 02:52)  HR: 103 (09-21 @ 04:35) (74 - 110)  BP: 139/77 (09-21 @ 04:35) (112/69 - 144/79)  RR: 18 (09-21 @ 04:35) (17 - 20)  SpO2: 98% (09-21 @ 04:35) (93% - 100%)    CAPILLARY BLOOD GLUCOSE  216 (20 Sep 2017 21:00)  160 (20 Sep 2017 16:08)  153 (20 Sep 2017 12:52)          I&O's Summary    20 Sep 2017 07:01  -  21 Sep 2017 07:00  --------------------------------------------------------  IN: 1560 mL / OUT: 1834 mL / NET: -274 mL        PHYSICAL EXAM:  GEN: Appears in no acute distress  HEENT: PERRLA, EOMI and accommodate, neck supple, no lymphadenopathy, no JVD  MOUTH: moist mucous membranes, no exudates or lesions   PULM: Clear to auscultation bilaterally, no wheezes, rales, rhonchi  CV: Regular rate and rhythm, S1S2, no murmurs, rubs or gallops  ABD: Soft, nontender to palpation, non-distended, normoactive bowel sounds  EXTREMITIES: No edema, + peripheral pulses  NEURO: AAOx3, moving all four extremities spontaneously  SKIN: No rashes, lesions, hematomas, ecchymosis    LABS & IMAGING:  Labs personally reviewed [X]                        8.9    10.9  )-----------( 296      ( 21 Sep 2017 03:59 )             25.9     Hgb Trend: 8.9<--, 8.4<--, 7.8<--, 8.4<--, 9.1<--    09-21    134<L>  |  89<L>  |  86<H>  ----------------------------<  116<H>  3.5   |  22  |  8.92<H>    Ca    8.9      21 Sep 2017 03:59  Phos  10.3     09-21  Mg     2.4     09-21    TPro  7.3  /  Alb  3.0<L>  /  TBili  0.5  /  DBili  x   /  AST  20  /  ALT  17  /  AlkPhos  84  09-21    Creatinine Trend: 8.92<--, 8.96<--, 10.55<--, 9.28<--, 7.75<--, 7.46<--    PT/INR - ( 21 Sep 2017 03:59 )   PT: 15.5 sec;   INR: 1.41 ratio         PTT - ( 21 Sep 2017 03:59 )  PTT:36.7 sec      CARDIAC MARKERS ( 21 Sep 2017 03:59 )  x     / 2.06 ng/mL / 76 U/L / x     / 3.6 ng/mL  CARDIAC MARKERS ( 20 Sep 2017 22:26 )  x     / 1.98 ng/mL / 92 U/L / x     / 3.6 ng/mL  CARDIAC MARKERS ( 20 Sep 2017 15:02 )  x     / 2.10 ng/mL / 83 U/L / x     / 3.1 ng/mL Patient is a 72y old  Male who presents with a chief complaint of s/p cardiac cath (12 Sep 2017 19:27)      SUBJECTIVE / OVERNIGHT EVENTS: Patient in and out of afib and aflutter, rate controlled overnight. Patient asymptomatic. This morning, patient in aflutter in 90s, still asymptomatic. Patient supposed to get HD, but will need to be moved to remain on heart monitor. Denies CP, SOB, flank pain. Patient making more urine today.     MEDICATIONS  (STANDING):  insulin lispro (HumaLOG) corrective regimen sliding scale   SubCutaneous three times a day before meals  insulin lispro (HumaLOG) corrective regimen sliding scale   SubCutaneous at bedtime  dextrose 5%. 1000 milliLiter(s) (50 mL/Hr) IV Continuous <Continuous>  dextrose 50% Injectable 12.5 Gram(s) IV Push once  dextrose 50% Injectable 25 Gram(s) IV Push once  dextrose 50% Injectable 25 Gram(s) IV Push once  aspirin enteric coated 81 milliGRAM(s) Oral daily  tamsulosin 0.4 milliGRAM(s) Oral at bedtime  isosorbide   mononitrate ER Tablet (IMDUR) 30 milliGRAM(s) Oral daily  ranolazine 1000 milliGRAM(s) Oral two times a day  clopidogrel Tablet 75 milliGRAM(s) Oral daily  cefTRIAXone   IVPB 1 Gram(s) IV Intermittent every 24 hours  buDESOnide  80 MICROgram(s)/formoterol 4.5 MICROgram(s) Inhaler 2 Puff(s) Inhalation two times a day  busPIRone 5 milliGRAM(s) Oral two times a day  cefTRIAXone   IVPB      atorvastatin 80 milliGRAM(s) Oral at bedtime  metoprolol succinate  milliGRAM(s) Oral daily  hydrALAZINE 10 milliGRAM(s) Oral three times a day  sevelamer hydrochloride 1600 milliGRAM(s) Oral three times a day with meals    MEDICATIONS  (PRN):  dextrose Gel 1 Dose(s) Oral once PRN Blood Glucose LESS THAN 70 milliGRAM(s)/deciliter  glucagon  Injectable 1 milliGRAM(s) IntraMuscular once PRN Glucose LESS THAN 70 milligrams/deciliter  ALBUTerol    90 MICROgram(s) HFA Inhaler 2 Puff(s) Inhalation every 6 hours PRN Shortness of Breath  acetaminophen   Tablet. 650 milliGRAM(s) Oral every 6 hours PRN pain  oxyCODONE    IR 5 milliGRAM(s) Oral every 4 hours PRN Severe Pain (7 - 10)      T(F): 97.6 (09-21 @ 04:35), Max: 98.6 (09-21 @ 02:52)  HR: 103 (09-21 @ 04:35) (74 - 110)  BP: 139/77 (09-21 @ 04:35) (112/69 - 144/79)  RR: 18 (09-21 @ 04:35) (17 - 20)  SpO2: 98% (09-21 @ 04:35) (93% - 100%)    CAPILLARY BLOOD GLUCOSE  216 (20 Sep 2017 21:00)  160 (20 Sep 2017 16:08)  153 (20 Sep 2017 12:52)    I&O's Summary    20 Sep 2017 07:01  -  21 Sep 2017 07:00  --------------------------------------------------------  IN: 1560 mL / OUT: 1834 mL / NET: -274 mL  Urine: 1500    PHYSICAL EXAM:  GEN: Appears in no acute distress  HEENT: PERRLA, EOMI and accommodate, neck supple, no lymphadenopathy, no JVD  MOUTH: moist mucous membranes, no exudates or lesions   PULM: bilateral minimal crackles. Pt on 3L nc at time of exam  CV: distant heart sounds, atrial fibrillation  ABD: Soft, nontender to palpation, non-distended, normoactive bowel sounds  EXTREMITIES: No edema, +1 peripheral pulses, large ecchymosis on L side from impella  NEURO: AAOx3, moving all four extremities spontaneously  TUBES: L peripheral IV, james draining clear yellow urine with some muddy casts. R IJ HD cath      LABS & IMAGING:  Labs personally reviewed [X]                        8.9    10.9  )-----------( 296      ( 21 Sep 2017 03:59 )             25.9     Hgb Trend: 8.9<--, 8.4<--, 7.8<--, 8.4<--, 9.1<--    09-21    134<L>  |  89<L>  |  86<H>  ----------------------------<  116<H>  3.5   |  22  |  8.92<H>    Ca    8.9      21 Sep 2017 03:59  Phos  10.3     09-21  Mg     2.4     09-21    TPro  7.3  /  Alb  3.0<L>  /  TBili  0.5  /  DBili  x   /  AST  20  /  ALT  17  /  AlkPhos  84  09-21    Creatinine Trend: 8.92<--, 8.96<--, 10.55<--, 9.28<--, 7.75<--, 7.46<--    PT/INR - ( 21 Sep 2017 03:59 )   PT: 15.5 sec;   INR: 1.41 ratio         PTT - ( 21 Sep 2017 03:59 )  PTT:36.7 sec      CARDIAC MARKERS ( 21 Sep 2017 03:59 )  x     / 2.06 ng/mL / 76 U/L / x     / 3.6 ng/mL  CARDIAC MARKERS ( 20 Sep 2017 22:26 )  x     / 1.98 ng/mL / 92 U/L / x     / 3.6 ng/mL  CARDIAC MARKERS ( 20 Sep 2017 15:02 )  x     / 2.10 ng/mL / 83 U/L / x     / 3.1 ng/mL

## 2017-09-21 NOTE — PROGRESS NOTE ADULT - PROBLEM SELECTOR PLAN 2
- c/w ceftriaxone 9/6 for pyelonephritis. Day 15 today - likely to finish Friday  - ID following  - UClx neg  - BClx NGTD - Acute renal failure on CKD in setting of obstructed R kidney (L kidney atrophic).   - patient had syncope at HD yesterday, fluid returned, new onset afib/aflutter  - c/w HD, next today - will need to remain on telemetry during HD  - s/p R IJ HD cath 9/20   - Urology recs: needs cardiac optimization prior to proceeding with ureteral stent  - monitor daily K and BMP  - K wnl

## 2017-09-21 NOTE — PROGRESS NOTE ADULT - ASSESSMENT
71 yo male transferred from [facility] for NSTEMI. Patient now s/p 5 stent placement, with new onset CHF, LIN on CKD, pyelonephritis.   overall better  plan to complete the ceftriaxone on  Friday     dc ceftriaxone in  am and reculture prn

## 2017-09-21 NOTE — PROGRESS NOTE ADULT - ASSESSMENT
71 y/o M with Right functional solitary kidney now obstructed with ureteral stone c/b renal failure requiring initiation of hemodialysis,  new NSTEMI s/p cardiac cath with 5 stents. Patient has extensive high risk cardiac disease with multiple prior stents.    --ultrasound today with no hydronephrosis of R kidney. UOP improving.   --recommend CT A/P non-contrast to assess for interval passage of ureteral stones  --continue flomax, strain urine, strict i/o  --f/up medicine, cards, nephrology

## 2017-09-21 NOTE — PROGRESS NOTE ADULT - SUBJECTIVE AND OBJECTIVE BOX
Subjective: decreased nausea. Appetite is somewhat better. UO 300cc/24h      MEDICATIONS  (STANDING):  insulin lispro (HumaLOG) corrective regimen sliding scale   SubCutaneous three times a day before meals  insulin lispro (HumaLOG) corrective regimen sliding scale   SubCutaneous at bedtime  dextrose 5%. 1000 milliLiter(s) (50 mL/Hr) IV Continuous <Continuous>  dextrose 50% Injectable 12.5 Gram(s) IV Push once  dextrose 50% Injectable 25 Gram(s) IV Push once  dextrose 50% Injectable 25 Gram(s) IV Push once  aspirin enteric coated 81 milliGRAM(s) Oral daily  tamsulosin 0.4 milliGRAM(s) Oral at bedtime  isosorbide   mononitrate ER Tablet (IMDUR) 30 milliGRAM(s) Oral daily  ranolazine 1000 milliGRAM(s) Oral two times a day  clopidogrel Tablet 75 milliGRAM(s) Oral daily  cefTRIAXone   IVPB 1 Gram(s) IV Intermittent every 24 hours  buDESOnide  80 MICROgram(s)/formoterol 4.5 MICROgram(s) Inhaler 2 Puff(s) Inhalation two times a day  busPIRone 5 milliGRAM(s) Oral two times a day  cefTRIAXone   IVPB      atorvastatin 80 milliGRAM(s) Oral at bedtime  metoprolol succinate  milliGRAM(s) Oral daily  hydrALAZINE 10 milliGRAM(s) Oral three times a day  sevelamer hydrochloride 1600 milliGRAM(s) Oral three times a day with meals    MEDICATIONS  (PRN):  dextrose Gel 1 Dose(s) Oral once PRN Blood Glucose LESS THAN 70 milliGRAM(s)/deciliter  glucagon  Injectable 1 milliGRAM(s) IntraMuscular once PRN Glucose LESS THAN 70 milligrams/deciliter  ALBUTerol    90 MICROgram(s) HFA Inhaler 2 Puff(s) Inhalation every 6 hours PRN Shortness of Breath  acetaminophen   Tablet. 650 milliGRAM(s) Oral every 6 hours PRN pain  oxyCODONE    IR 5 milliGRAM(s) Oral every 4 hours PRN Severe Pain (7 - 10)          T(C): 36.6 (09-21-17 @ 12:21), Max: 37 (09-21-17 @ 02:52)  HR: 94 (09-21-17 @ 12:21) (80 - 110)  BP: 136/88 (09-21-17 @ 12:21) (112/69 - 143/92)  RR: 18 (09-21-17 @ 12:21) (17 - 20)  SpO2: 100% (09-21-17 @ 12:21) (93% - 100%)  Wt(kg): --        I&O's Detail    20 Sep 2017 07:01  -  21 Sep 2017 07:00  --------------------------------------------------------  IN:    Oral Fluid: 1060 mL    Other: 500 mL  Total IN: 1560 mL    OUT:    Indwelling Catheter - Urethral: 1500 mL    Other: 334 mL  Total OUT: 1834 mL    Total NET: -274 mL      21 Sep 2017 07:01  -  21 Sep 2017 14:33  --------------------------------------------------------  IN:    Oral Fluid: 430 mL  Total IN: 430 mL    OUT:    Indwelling Catheter - Urethral: 300 mL  Total OUT: 300 mL    Total NET: 130 mL               PHYSICAL EXAM:    GENERAL: no distress.   EYES: EOMI, PERRLA, conjunctiva and sclera clear  NECK: Supple, no inc in JVP  CHEST/LUNG: Clear  HEART: S1S2  ABDOMEN: Soft, Nontender, Nondistended; Bowel sounds present  EXTREMITIES:  trace edema  NEURO: no asterixis  ACCESS: R IJ PC      LABS:  CBC Full  -  ( 21 Sep 2017 03:59 )  WBC Count : 10.9 K/uL  Hemoglobin : 8.9 g/dL  Hematocrit : 25.9 %  Platelet Count - Automated : 296 K/uL  Mean Cell Volume : 85.7 fl  Mean Cell Hemoglobin : 29.4 pg  Mean Cell Hemoglobin Concentration : 34.3 gm/dL  Auto Neutrophil # : 8.9 K/uL  Auto Lymphocyte # : 1.2 K/uL  Auto Monocyte # : 0.7 K/uL  Auto Eosinophil # : 0.1 K/uL  Auto Basophil # : 0.0 K/uL  Auto Neutrophil % : 81.4 %  Auto Lymphocyte % : 11.0 %  Auto Monocyte % : 6.5 %  Auto Eosinophil % : 1.0 %  Auto Basophil % : 0.1 %    09-21    134<L>  |  89<L>  |  86<H>  ----------------------------<  116<H>  3.5   |  22  |  8.92<H>    Ca    8.9      21 Sep 2017 03:59  Phos  10.3     09-21  Mg     2.4     09-21    TPro  7.3  /  Alb  3.0<L>  /  TBili  0.5  /  DBili  x   /  AST  20  /  ALT  17  /  AlkPhos  84  09-21    PT/INR - ( 21 Sep 2017 03:59 )   PT: 15.5 sec;   INR: 1.41 ratio         PTT - ( 21 Sep 2017 03:59 )  PTT:36.7 sec      ASSESSMENT and PLAN:  HD dependent LIN due to an obstructed solitary functioning R kidney.   * s/p NSTEMI -- S/p impella assisted multivessel BABAR X5.   * Awaiting urological intervention to decompress obstructed solitary kidney pending Cardiac clearance  * Remains HD dependent with no signs of recovery. Maint dialysis today via new temporary access.

## 2017-09-21 NOTE — PROGRESS NOTE ADULT - PROBLEM SELECTOR PLAN 6
Patient with h/o HTN  - c/w procardia, imdur, metoprolol. Start ACE-I when deemed ESRD. - c/w ceftriaxone 9/6 for pyelonephritis. Day 16/17 today  - ID following  - UClx neg  - BClx NGTD

## 2017-09-21 NOTE — PROGRESS NOTE ADULT - PROBLEM SELECTOR PLAN 1
- Acute renal failure on CKD in setting of obstructed R kidney (L kidney atrophic).   - c/w HD, next today  - R fem shiley removed yesterday without complication  - IR to replace today in IJ  - Urology will proceed with ureteral stent, awaiting urine clx and cardiac clearance  - mild hematuria, started with DAPTs, continue to monitor, urology following  - monitor daily K and BMP  - K 5.3 s/p kayexalate overnight New onset during syncopal episode in HD on 9/20. Continues to be in New onset during syncopal episode in HD on 9/20. Continues to be in aflutter in 90s.   - CHADSVASc: 7 (11.2% risk/year)  - BRBPR yesterday - will address AC with cardiology who continue to follow  - will need to remain on monitor through HD today  - cont. telemetry

## 2017-09-21 NOTE — AIRWAY PLACEMENT NOTE ADULT - POST AIRWAY PLACEMENT ASSESSMENT:
positive end tidal CO2 noted/CXR pending/breath sounds bilateral/breath sounds equal/chest excursion noted

## 2017-09-21 NOTE — PROGRESS NOTE ADULT - PROBLEM SELECTOR PLAN 8
- Symbicort BID  - albuterol PRN  - nasal cannula O2 PRN  - if very dyspneic, will start Bipap A1C 6.5. Patient on sitagliptin + metformin at home.  - low SSI  - FS at goal

## 2017-09-21 NOTE — PROGRESS NOTE ADULT - PROBLEM SELECTOR PLAN 5
Patient w/ hematuria as well as large ecchymoses in L groin, no signs of hematoma.   - stable  - not requiring transfusion  - daily CBC Volume status remains an issue (new HD & CHF)  - Echo: EF 35%. Mild-moderate MR, mod AS, mild LV enlargement, eccentric LV hypertrophy (dilated LV w/ nl relative wall thickness). Mod-severe segmental LV sys dysfunction. The mid to distal septum, mid to distal inferior, apical cap akinetic. Nl RV size & sys fxn  - c/w metoprolol succ 150 - will titrate w/ BPs  - c/w ASA, plavix, lipitor  - c/w ranexa, imdur  - c/w hydralazine 10 tid  - plan to start ACE-I once patient is deemed ESRD and HD dependant

## 2017-09-21 NOTE — PROGRESS NOTE ADULT - PROBLEM SELECTOR PLAN 4
Volume status remains an issue (new HD & CHF)  - Echo: EF 35%. Mild-moderate MR, mod AS, mild LV enlargement, eccentric LV hypertrophy (dilated LV w/ nl relative wall thickness). Mod-severe segmental LV sys dysfunction. The mid to distal septum, mid to distal inferior, apical cap akinetic. Nl RV size & sys fxn  - c/w metoprolol succ 150 - will titrate w/ BPs  - c/w ASA, plavix, lipitor  - c/w ranexa, imdur  - c/w hydralazine 10 tid  - plan to start ACE-I once patient is deemed ESRD and HD dependant CAD w/ multiple stents. s/p cath 9/14 - 2 stents LAD, 3 RCA w/ Impella assistance.   - continues to have intermittent chest pain, worse when overloaded  - c/w ASA, plavix, lipitor, BB  - c/w ranexa, imdur  - c/w hydralazine 10 tid  - appreciate cards recs

## 2017-09-21 NOTE — PROGRESS NOTE ADULT - PROBLEM SELECTOR PLAN 3
CAD w/ multiple stents. s/p cath 9/14 - 2 stents LAD, 3 RCA w/ Impella assistance.   - continues to have intermittent chest pain, worse when overloaded  - c/w ASA, plavix, lipitor, BB  - c/w ranexa, imdur  - c/w hydralazine 10 tid  - appreciate cards recs Patient w/ hematuria as well as large ecchymoses in L groin, no signs of hematoma. Yesterday had BRBPR after multiple BMs 2/2 kayexalate   - Hgb 7.8 yesterday - given 1/2 u pRBCS to avoid vol overload  - Hgb 8.9 today s/p 1/2 unit  - daily CBC - will continue to monitor for further blood in stool

## 2017-09-21 NOTE — PROGRESS NOTE ADULT - ATTENDING COMMENTS
Pt appears comfortable on NC, making more urine. Now in A flutter. Pt scheduled for dialysis today. Given syncopal episode and arrhythmia at dialysis yesterday, pt requires telemetry monitoring during dialysis. Plan to arrange pt to be transferred to a room capable of accomodating telemetry with dialysis.

## 2017-09-21 NOTE — CHART NOTE - NSCHARTNOTEFT_GEN_A_CORE
HPI:  This is 71 yo male  who is Grenadian speaking male with PMH CAD s/p 12-14  stents placed [id number], T2 DM. 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 Patient was admitted  with pyelonephritis started on ceftriaxone. He had LIN most likely secondary to  post obstructive neuropathy but he initially refused james and IR guided nephrostomy. Patient is now s/p james making no urine. Patient was started on dialysis there. Patient treated in the ICU for NSTEMI with TWI precordial and lateral leads  with increased troponin I  0.120>0.193 . Patient was also started on Heparin gtt, loaded with ASa and plavix, TTE with EF 35 %. He was intubated with hypoxic respiratory distress from pulm edema from the acute NSTEMI as well as treated for  Hypertensive emergency with NTG gtt.  Transferred to [facility]  for cardiac angiogram on Sept 12.     RRT called for hypotension and altered mental status.   Patient was unresponsive. No reaction to painful stimuli.  Non reactive pupils. Faint lung and heart sounds.  Patient was hypotensive to 68 systolic.   Patient had Dialysis earlier and 2L were removed.  Patient received dose of hydralazine today as well.   Patient was started on 1L NS on pressor bag.  Blood work was repeated included cardiac enzymes and ABG.   Patient's EKG was obtained.  Initially patient's HR was in the 70s but dropped to 20.  Patient lost pulse.   Cardiac compressions were initiated. Patient was given a total of 5 rounds of Epinephrine.  300 mg of Amiodarone during the third pulse check when he was in VT.  Patient went back to PEA and compressions were continued for the second two rounds.  Patient also received amp of bicarb and calcium.  Patient was intubated by anesthesia.  After the fifth pulse check, the code was stopped, no pulse, however couple minutes later patient's pulse came back.  Patient's initial BP was 120s systolic, Levophed was initiated. Patient was in VT and Amiodarone was started.  Patient was transferred to CCU for further care. HPI:  This is 71 yo male  who is Martiniquais speaking male with PMH CAD s/p 12-14  stents placed [id number], T2 DM. 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 Patient was admitted  with pyelonephritis started on ceftriaxone. He had LIN most likely secondary to  post obstructive neuropathy but he initially refused james and IR guided nephrostomy. Patient is now s/p james making no urine. Patient was started on dialysis there. Patient treated in the ICU for NSTEMI with TWI precordial and lateral leads  with increased troponin I  0.120>0.193 . Patient was also started on Heparin gtt, loaded with ASa and plavix, TTE with EF 35 %. He was intubated with hypoxic respiratory distress from pulm edema from the acute NSTEMI as well as treated for  Hypertensive emergency with NTG gtt.  Transferred to [facility]  for cardiac angiogram on Sept 12.     RRT called for hypotension and altered mental status.   Patient was unresponsive. No reaction to painful stimuli.  Non reactive pupils. Faint lung and heart sounds.  Patient was hypotensive to 68 systolic.   Patient had Dialysis earlier and 2L were removed.  Patient received dose of hydralazine today as well. Patient was started on 1L NS on pressor bag.  Blood work was repeated included cardiac enzymes and ABG. Patient's EKG was obtained.  Initially patient's HR was in the 70s but dropped to 20.  Patient lost pulse.   Cardiac compressions were initiated. Patient was given a total of 5 rounds of Epinephrine.  300 mg of Amiodarone during the third pulse check when he was in VT.  Patient went back to PEA and compressions were continued for the second two rounds.  Patient also received amp of bicarb and calcium.  Patient was intubated by anesthesia.  After the fifth pulse check, the code was stopped, no pulse, however couple minutes later patient's pulse came back.  Patient's initial BP was 120s systolic, Levophed was initiated. Patient was in VT and Amiodarone was started.  Patient was transferred to CCU for further care.

## 2017-09-21 NOTE — PROGRESS NOTE ADULT - PROBLEM SELECTOR PROBLEM 4
Acute systolic congestive heart failure Coronary artery disease of native artery of native heart with stable angina pectoris

## 2017-09-22 ENCOUNTER — TRANSCRIPTION ENCOUNTER (OUTPATIENT)
Age: 72
End: 2017-09-22

## 2017-09-22 DIAGNOSIS — J96.90 RESPIRATORY FAILURE, UNSPECIFIED, UNSPECIFIED WHETHER WITH HYPOXIA OR HYPERCAPNIA: ICD-10-CM

## 2017-09-22 DIAGNOSIS — I46.9 CARDIAC ARREST, CAUSE UNSPECIFIED: ICD-10-CM

## 2017-09-22 DIAGNOSIS — I63.9 CEREBRAL INFARCTION, UNSPECIFIED: ICD-10-CM

## 2017-09-22 LAB
ALBUMIN SERPL ELPH-MCNC: 2.7 G/DL — LOW (ref 3.3–5)
ALBUMIN SERPL ELPH-MCNC: 3.1 G/DL — LOW (ref 3.3–5)
ALBUMIN SERPL ELPH-MCNC: 3.1 G/DL — LOW (ref 3.3–5)
ALBUMIN SERPL ELPH-MCNC: 3.2 G/DL — LOW (ref 3.3–5)
ALP SERPL-CCNC: 77 U/L — SIGNIFICANT CHANGE UP (ref 40–120)
ALP SERPL-CCNC: 79 U/L — SIGNIFICANT CHANGE UP (ref 40–120)
ALP SERPL-CCNC: 83 U/L — SIGNIFICANT CHANGE UP (ref 40–120)
ALP SERPL-CCNC: 88 U/L — SIGNIFICANT CHANGE UP (ref 40–120)
ALT FLD-CCNC: 103 U/L RC — HIGH (ref 10–45)
ALT FLD-CCNC: 112 U/L RC — HIGH (ref 10–45)
ALT FLD-CCNC: 113 U/L RC — HIGH (ref 10–45)
ALT FLD-CCNC: 120 U/L RC — HIGH (ref 10–45)
ANION GAP SERPL CALC-SCNC: 16 MMOL/L — SIGNIFICANT CHANGE UP (ref 5–17)
ANION GAP SERPL CALC-SCNC: 18 MMOL/L — HIGH (ref 5–17)
ANION GAP SERPL CALC-SCNC: 18 MMOL/L — HIGH (ref 5–17)
ANION GAP SERPL CALC-SCNC: 19 MMOL/L — HIGH (ref 5–17)
ANION GAP SERPL CALC-SCNC: 23 MMOL/L — HIGH (ref 5–17)
ANISOCYTOSIS BLD QL: SLIGHT — SIGNIFICANT CHANGE UP
APTT BLD: 163.5 SEC — CRITICAL HIGH (ref 27.5–37.4)
APTT BLD: 94.7 SEC — HIGH (ref 27.5–37.4)
AST SERPL-CCNC: 184 U/L — HIGH (ref 10–40)
AST SERPL-CCNC: 185 U/L — HIGH (ref 10–40)
AST SERPL-CCNC: 190 U/L — HIGH (ref 10–40)
AST SERPL-CCNC: 379 U/L — HIGH (ref 10–40)
BASOPHILS # BLD AUTO: 0 K/UL — SIGNIFICANT CHANGE UP (ref 0–0.2)
BASOPHILS # BLD AUTO: 0 K/UL — SIGNIFICANT CHANGE UP (ref 0–0.2)
BASOPHILS NFR BLD AUTO: 0 % — SIGNIFICANT CHANGE UP (ref 0–2)
BASOPHILS NFR BLD AUTO: 0.1 % — SIGNIFICANT CHANGE UP (ref 0–2)
BILIRUB DIRECT SERPL-MCNC: 0.2 MG/DL — SIGNIFICANT CHANGE UP (ref 0–0.2)
BILIRUB INDIRECT FLD-MCNC: 0.4 MG/DL — SIGNIFICANT CHANGE UP (ref 0.2–1)
BILIRUB SERPL-MCNC: 0.4 MG/DL — SIGNIFICANT CHANGE UP (ref 0.2–1.2)
BILIRUB SERPL-MCNC: 0.4 MG/DL — SIGNIFICANT CHANGE UP (ref 0.2–1.2)
BILIRUB SERPL-MCNC: 0.6 MG/DL — SIGNIFICANT CHANGE UP (ref 0.2–1.2)
BILIRUB SERPL-MCNC: 0.6 MG/DL — SIGNIFICANT CHANGE UP (ref 0.2–1.2)
BUN SERPL-MCNC: 29 MG/DL — HIGH (ref 7–23)
BUN SERPL-MCNC: 33 MG/DL — HIGH (ref 7–23)
BUN SERPL-MCNC: 39 MG/DL — HIGH (ref 7–23)
BUN SERPL-MCNC: 42 MG/DL — HIGH (ref 7–23)
BUN SERPL-MCNC: 49 MG/DL — HIGH (ref 7–23)
CALCIUM SERPL-MCNC: 8.4 MG/DL — SIGNIFICANT CHANGE UP (ref 8.4–10.5)
CALCIUM SERPL-MCNC: 8.5 MG/DL — SIGNIFICANT CHANGE UP (ref 8.4–10.5)
CALCIUM SERPL-MCNC: 8.9 MG/DL — SIGNIFICANT CHANGE UP (ref 8.4–10.5)
CALCIUM SERPL-MCNC: 9 MG/DL — SIGNIFICANT CHANGE UP (ref 8.4–10.5)
CALCIUM SERPL-MCNC: 9.3 MG/DL — SIGNIFICANT CHANGE UP (ref 8.4–10.5)
CHLORIDE SERPL-SCNC: 94 MMOL/L — LOW (ref 96–108)
CHLORIDE SERPL-SCNC: 95 MMOL/L — LOW (ref 96–108)
CHLORIDE SERPL-SCNC: 96 MMOL/L — SIGNIFICANT CHANGE UP (ref 96–108)
CK MB BLD-MCNC: 3.1 % — SIGNIFICANT CHANGE UP (ref 0–3.5)
CK MB BLD-MCNC: 3.1 % — SIGNIFICANT CHANGE UP (ref 0–3.5)
CK MB BLD-MCNC: 4.8 % — HIGH (ref 0–3.5)
CK MB BLD-MCNC: 5.7 % — HIGH (ref 0–3.5)
CK MB CFR SERPL CALC: 2.6 NG/ML — SIGNIFICANT CHANGE UP (ref 0–6.7)
CK MB CFR SERPL CALC: 26.8 NG/ML — HIGH (ref 0–6.7)
CK MB CFR SERPL CALC: 31.8 NG/ML — HIGH (ref 0–6.7)
CK MB CFR SERPL CALC: 4.9 NG/ML — SIGNIFICANT CHANGE UP (ref 0–6.7)
CK MB CFR SERPL CALC: 53.3 NG/ML — HIGH (ref 0–6.7)
CK SERPL-CCNC: 160 U/L — SIGNIFICANT CHANGE UP (ref 30–200)
CK SERPL-CCNC: 664 U/L — HIGH (ref 30–200)
CK SERPL-CCNC: 865 U/L — HIGH (ref 30–200)
CK SERPL-CCNC: 928 U/L — HIGH (ref 30–200)
CO2 SERPL-SCNC: 18 MMOL/L — LOW (ref 22–31)
CO2 SERPL-SCNC: 20 MMOL/L — LOW (ref 22–31)
CO2 SERPL-SCNC: 20 MMOL/L — LOW (ref 22–31)
CO2 SERPL-SCNC: 22 MMOL/L — SIGNIFICANT CHANGE UP (ref 22–31)
CO2 SERPL-SCNC: 23 MMOL/L — SIGNIFICANT CHANGE UP (ref 22–31)
CREAT SERPL-MCNC: 3.28 MG/DL — HIGH (ref 0.5–1.3)
CREAT SERPL-MCNC: 3.84 MG/DL — HIGH (ref 0.5–1.3)
CREAT SERPL-MCNC: 4.61 MG/DL — HIGH (ref 0.5–1.3)
CREAT SERPL-MCNC: 4.86 MG/DL — HIGH (ref 0.5–1.3)
CREAT SERPL-MCNC: 5.7 MG/DL — HIGH (ref 0.5–1.3)
DACRYOCYTES BLD QL SMEAR: SLIGHT — SIGNIFICANT CHANGE UP
EOSINOPHIL # BLD AUTO: 0 K/UL — SIGNIFICANT CHANGE UP (ref 0–0.5)
EOSINOPHIL # BLD AUTO: 0 K/UL — SIGNIFICANT CHANGE UP (ref 0–0.5)
EOSINOPHIL NFR BLD AUTO: 0.1 % — SIGNIFICANT CHANGE UP (ref 0–6)
EOSINOPHIL NFR BLD AUTO: 1 % — SIGNIFICANT CHANGE UP (ref 0–6)
GAS PNL BLDA: SIGNIFICANT CHANGE UP
GAS PNL BLDV: SIGNIFICANT CHANGE UP
GLUCOSE SERPL-MCNC: 110 MG/DL — HIGH (ref 70–99)
GLUCOSE SERPL-MCNC: 173 MG/DL — HIGH (ref 70–99)
GLUCOSE SERPL-MCNC: 258 MG/DL — HIGH (ref 70–99)
GLUCOSE SERPL-MCNC: 328 MG/DL — HIGH (ref 70–99)
GLUCOSE SERPL-MCNC: 359 MG/DL — HIGH (ref 70–99)
HCT VFR BLD CALC: 21.7 % — LOW (ref 39–50)
HCT VFR BLD CALC: 22.5 % — LOW (ref 39–50)
HCT VFR BLD CALC: 25.1 % — LOW (ref 39–50)
HCT VFR BLD CALC: 25.1 % — LOW (ref 39–50)
HCT VFR BLD CALC: 25.3 % — LOW (ref 39–50)
HGB BLD-MCNC: 7.3 G/DL — LOW (ref 13–17)
HGB BLD-MCNC: 7.4 G/DL — LOW (ref 13–17)
HGB BLD-MCNC: 8.4 G/DL — LOW (ref 13–17)
HGB BLD-MCNC: 8.4 G/DL — LOW (ref 13–17)
HGB BLD-MCNC: 8.5 G/DL — LOW (ref 13–17)
HYPOCHROMIA BLD QL: SLIGHT — SIGNIFICANT CHANGE UP
INR BLD: 1.42 RATIO — HIGH (ref 0.88–1.16)
INR BLD: 1.46 RATIO — HIGH (ref 0.88–1.16)
LACTATE SERPL-SCNC: 1.8 MMOL/L — SIGNIFICANT CHANGE UP (ref 0.7–2)
LACTATE SERPL-SCNC: 2.7 MMOL/L — HIGH (ref 0.7–2)
LYMPHOCYTES # BLD AUTO: 0.8 K/UL — LOW (ref 1–3.3)
LYMPHOCYTES # BLD AUTO: 1.4 K/UL — SIGNIFICANT CHANGE UP (ref 1–3.3)
LYMPHOCYTES # BLD AUTO: 2 % — LOW (ref 13–44)
LYMPHOCYTES # BLD AUTO: 4.4 % — LOW (ref 13–44)
MAGNESIUM SERPL-MCNC: 2 MG/DL — SIGNIFICANT CHANGE UP (ref 1.6–2.6)
MAGNESIUM SERPL-MCNC: 2.3 MG/DL — SIGNIFICANT CHANGE UP (ref 1.6–2.6)
MCHC RBC-ENTMCNC: 29 PG — SIGNIFICANT CHANGE UP (ref 27–34)
MCHC RBC-ENTMCNC: 29.1 PG — SIGNIFICANT CHANGE UP (ref 27–34)
MCHC RBC-ENTMCNC: 29.4 PG — SIGNIFICANT CHANGE UP (ref 27–34)
MCHC RBC-ENTMCNC: 29.5 PG — SIGNIFICANT CHANGE UP (ref 27–34)
MCHC RBC-ENTMCNC: 29.6 PG — SIGNIFICANT CHANGE UP (ref 27–34)
MCHC RBC-ENTMCNC: 33.1 GM/DL — SIGNIFICANT CHANGE UP (ref 32–36)
MCHC RBC-ENTMCNC: 33.4 GM/DL — SIGNIFICANT CHANGE UP (ref 32–36)
MCHC RBC-ENTMCNC: 33.4 GM/DL — SIGNIFICANT CHANGE UP (ref 32–36)
MCHC RBC-ENTMCNC: 33.5 GM/DL — SIGNIFICANT CHANGE UP (ref 32–36)
MCHC RBC-ENTMCNC: 33.9 GM/DL — SIGNIFICANT CHANGE UP (ref 32–36)
MCV RBC AUTO: 87.2 FL — SIGNIFICANT CHANGE UP (ref 80–100)
MCV RBC AUTO: 87.2 FL — SIGNIFICANT CHANGE UP (ref 80–100)
MCV RBC AUTO: 87.5 FL — SIGNIFICANT CHANGE UP (ref 80–100)
MCV RBC AUTO: 87.6 FL — SIGNIFICANT CHANGE UP (ref 80–100)
MCV RBC AUTO: 88.3 FL — SIGNIFICANT CHANGE UP (ref 80–100)
METAMYELOCYTES # FLD: 1 % — HIGH (ref 0–0)
MONOCYTES # BLD AUTO: 1.3 K/UL — HIGH (ref 0–0.9)
MONOCYTES # BLD AUTO: 1.7 K/UL — HIGH (ref 0–0.9)
MONOCYTES NFR BLD AUTO: 5.4 % — SIGNIFICANT CHANGE UP (ref 2–14)
MONOCYTES NFR BLD AUTO: 7 % — SIGNIFICANT CHANGE UP (ref 2–14)
NEUTROPHILS # BLD AUTO: 26 K/UL — HIGH (ref 1.8–7.4)
NEUTROPHILS # BLD AUTO: 28.8 K/UL — HIGH (ref 1.8–7.4)
NEUTROPHILS NFR BLD AUTO: 86 % — HIGH (ref 43–77)
NEUTROPHILS NFR BLD AUTO: 90.1 % — HIGH (ref 43–77)
NEUTS BAND # BLD: 3 % — SIGNIFICANT CHANGE UP (ref 0–8)
NEUTS HYPERSEG # BLD: PRESENT — SIGNIFICANT CHANGE UP
OVALOCYTES BLD QL SMEAR: SLIGHT — SIGNIFICANT CHANGE UP
PHOSPHATE SERPL-MCNC: 4.5 MG/DL — SIGNIFICANT CHANGE UP (ref 2.5–4.5)
PHOSPHATE SERPL-MCNC: 5.9 MG/DL — HIGH (ref 2.5–4.5)
PHOSPHATE SERPL-MCNC: 6.1 MG/DL — HIGH (ref 2.5–4.5)
PHOSPHATE SERPL-MCNC: 6.4 MG/DL — HIGH (ref 2.5–4.5)
PLAT MORPH BLD: NORMAL — SIGNIFICANT CHANGE UP
PLATELET # BLD AUTO: 131 K/UL — LOW (ref 150–400)
PLATELET # BLD AUTO: 151 K/UL — SIGNIFICANT CHANGE UP (ref 150–400)
PLATELET # BLD AUTO: 166 K/UL — SIGNIFICANT CHANGE UP (ref 150–400)
PLATELET # BLD AUTO: 175 K/UL — SIGNIFICANT CHANGE UP (ref 150–400)
PLATELET # BLD AUTO: 195 K/UL — SIGNIFICANT CHANGE UP (ref 150–400)
POIKILOCYTOSIS BLD QL AUTO: SLIGHT — SIGNIFICANT CHANGE UP
POLYCHROMASIA BLD QL SMEAR: SLIGHT — SIGNIFICANT CHANGE UP
POTASSIUM SERPL-MCNC: 4 MMOL/L — SIGNIFICANT CHANGE UP (ref 3.5–5.3)
POTASSIUM SERPL-MCNC: 4.3 MMOL/L — SIGNIFICANT CHANGE UP (ref 3.5–5.3)
POTASSIUM SERPL-MCNC: 4.6 MMOL/L — SIGNIFICANT CHANGE UP (ref 3.5–5.3)
POTASSIUM SERPL-MCNC: 4.7 MMOL/L — SIGNIFICANT CHANGE UP (ref 3.5–5.3)
POTASSIUM SERPL-MCNC: 4.8 MMOL/L — SIGNIFICANT CHANGE UP (ref 3.5–5.3)
POTASSIUM SERPL-SCNC: 4 MMOL/L — SIGNIFICANT CHANGE UP (ref 3.5–5.3)
POTASSIUM SERPL-SCNC: 4.3 MMOL/L — SIGNIFICANT CHANGE UP (ref 3.5–5.3)
POTASSIUM SERPL-SCNC: 4.6 MMOL/L — SIGNIFICANT CHANGE UP (ref 3.5–5.3)
POTASSIUM SERPL-SCNC: 4.7 MMOL/L — SIGNIFICANT CHANGE UP (ref 3.5–5.3)
POTASSIUM SERPL-SCNC: 4.8 MMOL/L — SIGNIFICANT CHANGE UP (ref 3.5–5.3)
PROT SERPL-MCNC: 6.4 G/DL — SIGNIFICANT CHANGE UP (ref 6–8.3)
PROT SERPL-MCNC: 6.5 G/DL — SIGNIFICANT CHANGE UP (ref 6–8.3)
PROT SERPL-MCNC: 6.6 G/DL — SIGNIFICANT CHANGE UP (ref 6–8.3)
PROT SERPL-MCNC: 6.9 G/DL — SIGNIFICANT CHANGE UP (ref 6–8.3)
PROTHROM AB SERPL-ACNC: 15.4 SEC — HIGH (ref 9.8–12.7)
PROTHROM AB SERPL-ACNC: 15.9 SEC — HIGH (ref 9.8–12.7)
RBC # BLD: 2.47 M/UL — LOW (ref 4.2–5.8)
RBC # BLD: 2.57 M/UL — LOW (ref 4.2–5.8)
RBC # BLD: 2.86 M/UL — LOW (ref 4.2–5.8)
RBC # BLD: 2.87 M/UL — LOW (ref 4.2–5.8)
RBC # BLD: 2.88 M/UL — LOW (ref 4.2–5.8)
RBC # FLD: 12.3 % — SIGNIFICANT CHANGE UP (ref 10.3–14.5)
RBC # FLD: 12.3 % — SIGNIFICANT CHANGE UP (ref 10.3–14.5)
RBC # FLD: 12.4 % — SIGNIFICANT CHANGE UP (ref 10.3–14.5)
RBC # FLD: 12.5 % — SIGNIFICANT CHANGE UP (ref 10.3–14.5)
RBC # FLD: 12.6 % — SIGNIFICANT CHANGE UP (ref 10.3–14.5)
RBC BLD AUTO: ABNORMAL
SODIUM SERPL-SCNC: 134 MMOL/L — LOW (ref 135–145)
SODIUM SERPL-SCNC: 134 MMOL/L — LOW (ref 135–145)
SODIUM SERPL-SCNC: 135 MMOL/L — SIGNIFICANT CHANGE UP (ref 135–145)
SODIUM SERPL-SCNC: 135 MMOL/L — SIGNIFICANT CHANGE UP (ref 135–145)
SODIUM SERPL-SCNC: 136 MMOL/L — SIGNIFICANT CHANGE UP (ref 135–145)
TOXIC GRANULES BLD QL SMEAR: PRESENT — SIGNIFICANT CHANGE UP
TROPONIN T SERPL-MCNC: 1.98 NG/ML — HIGH (ref 0–0.06)
TROPONIN T SERPL-MCNC: 3.24 NG/ML — HIGH (ref 0–0.06)
TROPONIN T SERPL-MCNC: 6.3 NG/ML — HIGH (ref 0–0.06)
TROPONIN T SERPL-MCNC: 8.14 NG/ML — HIGH (ref 0–0.06)
WBC # BLD: 24.4 K/UL — HIGH (ref 3.8–10.5)
WBC # BLD: 26.5 K/UL — HIGH (ref 3.8–10.5)
WBC # BLD: 28.2 K/UL — HIGH (ref 3.8–10.5)
WBC # BLD: 28.4 K/UL — HIGH (ref 3.8–10.5)
WBC # BLD: 31.9 K/UL — HIGH (ref 3.8–10.5)
WBC # FLD AUTO: 24.4 K/UL — HIGH (ref 3.8–10.5)
WBC # FLD AUTO: 26.5 K/UL — HIGH (ref 3.8–10.5)
WBC # FLD AUTO: 28.2 K/UL — HIGH (ref 3.8–10.5)
WBC # FLD AUTO: 28.4 K/UL — HIGH (ref 3.8–10.5)
WBC # FLD AUTO: 31.9 K/UL — HIGH (ref 3.8–10.5)

## 2017-09-22 PROCEDURE — 99223 1ST HOSP IP/OBS HIGH 75: CPT | Mod: GC

## 2017-09-22 PROCEDURE — 70450 CT HEAD/BRAIN W/O DYE: CPT | Mod: 26

## 2017-09-22 PROCEDURE — 99291 CRITICAL CARE FIRST HOUR: CPT

## 2017-09-22 PROCEDURE — 71010: CPT | Mod: 26

## 2017-09-22 PROCEDURE — 93306 TTE W/DOPPLER COMPLETE: CPT | Mod: 26

## 2017-09-22 PROCEDURE — 93010 ELECTROCARDIOGRAM REPORT: CPT

## 2017-09-22 RX ORDER — AMIODARONE HYDROCHLORIDE 400 MG/1
1 TABLET ORAL
Qty: 900 | Refills: 0 | Status: DISCONTINUED | OUTPATIENT
Start: 2017-09-22 | End: 2017-09-23

## 2017-09-22 RX ORDER — FENTANYL CITRATE 50 UG/ML
1 INJECTION INTRAVENOUS
Qty: 2500 | Refills: 0 | Status: DISCONTINUED | OUTPATIENT
Start: 2017-09-22 | End: 2017-09-22

## 2017-09-22 RX ORDER — SODIUM CHLORIDE 9 MG/ML
250 INJECTION INTRAMUSCULAR; INTRAVENOUS; SUBCUTANEOUS ONCE
Qty: 0 | Refills: 0 | Status: COMPLETED | OUTPATIENT
Start: 2017-09-22 | End: 2017-09-22

## 2017-09-22 RX ORDER — GLUCAGON INJECTION, SOLUTION 0.5 MG/.1ML
1 INJECTION, SOLUTION SUBCUTANEOUS ONCE
Qty: 0 | Refills: 0 | Status: DISCONTINUED | OUTPATIENT
Start: 2017-09-22 | End: 2017-09-22

## 2017-09-22 RX ORDER — SODIUM CHLORIDE 9 MG/ML
1000 INJECTION, SOLUTION INTRAVENOUS
Qty: 0 | Refills: 0 | Status: DISCONTINUED | OUTPATIENT
Start: 2017-09-22 | End: 2017-09-22

## 2017-09-22 RX ORDER — INSULIN HUMAN 100 [IU]/ML
2 INJECTION, SOLUTION SUBCUTANEOUS
Qty: 100 | Refills: 0 | Status: DISCONTINUED | OUTPATIENT
Start: 2017-09-22 | End: 2017-09-22

## 2017-09-22 RX ORDER — VANCOMYCIN HCL 1 G
1000 VIAL (EA) INTRAVENOUS EVERY 12 HOURS
Qty: 0 | Refills: 0 | Status: DISCONTINUED | OUTPATIENT
Start: 2017-09-22 | End: 2017-09-22

## 2017-09-22 RX ORDER — ASPIRIN/CALCIUM CARB/MAGNESIUM 324 MG
81 TABLET ORAL DAILY
Qty: 0 | Refills: 0 | Status: DISCONTINUED | OUTPATIENT
Start: 2017-09-22 | End: 2017-10-12

## 2017-09-22 RX ORDER — INSULIN LISPRO 100/ML
VIAL (ML) SUBCUTANEOUS
Qty: 0 | Refills: 0 | Status: DISCONTINUED | OUTPATIENT
Start: 2017-09-22 | End: 2017-09-22

## 2017-09-22 RX ORDER — METOPROLOL TARTRATE 50 MG
12.5 TABLET ORAL
Qty: 0 | Refills: 0 | Status: DISCONTINUED | OUTPATIENT
Start: 2017-09-22 | End: 2017-09-22

## 2017-09-22 RX ORDER — ATORVASTATIN CALCIUM 80 MG/1
40 TABLET, FILM COATED ORAL AT BEDTIME
Qty: 0 | Refills: 0 | Status: DISCONTINUED | OUTPATIENT
Start: 2017-09-22 | End: 2017-10-18

## 2017-09-22 RX ORDER — PANTOPRAZOLE SODIUM 20 MG/1
40 TABLET, DELAYED RELEASE ORAL DAILY
Qty: 0 | Refills: 0 | Status: DISCONTINUED | OUTPATIENT
Start: 2017-09-22 | End: 2017-09-26

## 2017-09-22 RX ORDER — INSULIN LISPRO 100/ML
5 VIAL (ML) SUBCUTANEOUS ONCE
Qty: 0 | Refills: 0 | Status: COMPLETED | OUTPATIENT
Start: 2017-09-22 | End: 2017-09-22

## 2017-09-22 RX ORDER — FENTANYL CITRATE 50 UG/ML
1 INJECTION INTRAVENOUS
Qty: 5000 | Refills: 0 | Status: DISCONTINUED | OUTPATIENT
Start: 2017-09-22 | End: 2017-09-22

## 2017-09-22 RX ORDER — AMIODARONE HYDROCHLORIDE 400 MG/1
0.5 TABLET ORAL
Qty: 900 | Refills: 0 | Status: COMPLETED | OUTPATIENT
Start: 2017-09-22 | End: 2017-09-22

## 2017-09-22 RX ORDER — MIDAZOLAM HYDROCHLORIDE 1 MG/ML
1 INJECTION, SOLUTION INTRAMUSCULAR; INTRAVENOUS
Qty: 100 | Refills: 0 | Status: DISCONTINUED | OUTPATIENT
Start: 2017-09-22 | End: 2017-09-22

## 2017-09-22 RX ORDER — PIPERACILLIN AND TAZOBACTAM 4; .5 G/20ML; G/20ML
3.38 INJECTION, POWDER, LYOPHILIZED, FOR SOLUTION INTRAVENOUS EVERY 12 HOURS
Qty: 0 | Refills: 0 | Status: DISCONTINUED | OUTPATIENT
Start: 2017-09-22 | End: 2017-09-23

## 2017-09-22 RX ORDER — DEXTROSE 50 % IN WATER 50 %
1 SYRINGE (ML) INTRAVENOUS ONCE
Qty: 0 | Refills: 0 | Status: DISCONTINUED | OUTPATIENT
Start: 2017-09-22 | End: 2017-09-22

## 2017-09-22 RX ORDER — DEXTROSE 50 % IN WATER 50 %
25 SYRINGE (ML) INTRAVENOUS ONCE
Qty: 0 | Refills: 0 | Status: DISCONTINUED | OUTPATIENT
Start: 2017-09-22 | End: 2017-09-22

## 2017-09-22 RX ORDER — VANCOMYCIN HCL 1 G
1000 VIAL (EA) INTRAVENOUS EVERY 24 HOURS
Qty: 0 | Refills: 0 | Status: DISCONTINUED | OUTPATIENT
Start: 2017-09-22 | End: 2017-09-22

## 2017-09-22 RX ORDER — DEXTROSE 50 % IN WATER 50 %
12.5 SYRINGE (ML) INTRAVENOUS ONCE
Qty: 0 | Refills: 0 | Status: DISCONTINUED | OUTPATIENT
Start: 2017-09-22 | End: 2017-09-22

## 2017-09-22 RX ORDER — INSULIN LISPRO 100/ML
VIAL (ML) SUBCUTANEOUS EVERY 6 HOURS
Qty: 0 | Refills: 0 | Status: DISCONTINUED | OUTPATIENT
Start: 2017-09-22 | End: 2017-09-29

## 2017-09-22 RX ADMIN — AMIODARONE HYDROCHLORIDE 16.67 MG/MIN: 400 TABLET ORAL at 19:55

## 2017-09-22 RX ADMIN — Medication 5 MILLIGRAM(S): at 17:01

## 2017-09-22 RX ADMIN — PANTOPRAZOLE SODIUM 40 MILLIGRAM(S): 20 TABLET, DELAYED RELEASE ORAL at 07:08

## 2017-09-22 RX ADMIN — PANTOPRAZOLE SODIUM 40 MILLIGRAM(S): 20 TABLET, DELAYED RELEASE ORAL at 11:48

## 2017-09-22 RX ADMIN — Medication 250 MILLIGRAM(S): at 11:29

## 2017-09-22 RX ADMIN — PIPERACILLIN AND TAZOBACTAM 25 GRAM(S): 4; .5 INJECTION, POWDER, LYOPHILIZED, FOR SOLUTION INTRAVENOUS at 17:01

## 2017-09-22 RX ADMIN — AMIODARONE HYDROCHLORIDE 33.33 MG/MIN: 400 TABLET ORAL at 01:44

## 2017-09-22 RX ADMIN — Medication 5 UNIT(S): at 03:04

## 2017-09-22 RX ADMIN — Medication 81 MILLIGRAM(S): at 11:48

## 2017-09-22 RX ADMIN — Medication 5 MILLIGRAM(S): at 07:08

## 2017-09-22 RX ADMIN — CLOPIDOGREL BISULFATE 75 MILLIGRAM(S): 75 TABLET, FILM COATED ORAL at 11:48

## 2017-09-22 RX ADMIN — HEPARIN SODIUM 5000 UNIT(S): 5000 INJECTION INTRAVENOUS; SUBCUTANEOUS at 07:09

## 2017-09-22 RX ADMIN — TAMSULOSIN HYDROCHLORIDE 0.4 MILLIGRAM(S): 0.4 CAPSULE ORAL at 21:10

## 2017-09-22 RX ADMIN — FENTANYL CITRATE 4.08 MICROGRAM(S)/KG/HR: 50 INJECTION INTRAVENOUS at 01:28

## 2017-09-22 RX ADMIN — ATORVASTATIN CALCIUM 40 MILLIGRAM(S): 80 TABLET, FILM COATED ORAL at 21:10

## 2017-09-22 RX ADMIN — SODIUM CHLORIDE 500 MILLILITER(S): 9 INJECTION INTRAMUSCULAR; INTRAVENOUS; SUBCUTANEOUS at 08:03

## 2017-09-22 RX ADMIN — AMIODARONE HYDROCHLORIDE 16.67 MG/MIN: 400 TABLET ORAL at 10:05

## 2017-09-22 RX ADMIN — PROPOFOL 4.9 MICROGRAM(S)/KG/MIN: 10 INJECTION, EMULSION INTRAVENOUS at 01:28

## 2017-09-22 NOTE — PROGRESS NOTE ADULT - ATTENDING COMMENTS
Patient is seen and examined with fellow, NP and the CCU house-staff. I agree with the history, physical and the assessment and plan.  cardiac arrest most likely due to fluid shift  maintain CVP 10--12  wean off of pressors  repeat ABG now - possible CPAP  no HD today  trend CE

## 2017-09-22 NOTE — DISCHARGE NOTE ADULT - CARE PLAN
Principal Discharge DX:	CVA (cerebral vascular accident)  Goal:	Rehabilitation  Secondary Diagnosis:	NSTEMI (non-ST elevated myocardial infarction)  Instructions for follow-up, activity and diet:	You were brought to [employer] for a cardiac event you had at an outside hospital that required 5 stents to be placed. You have been followed closely by cardiology while admitted to the hospital. Because you have these stents, please continue to take your daily PLAVIX and ASPIRIN to prevent clots at the stents.  Secondary Diagnosis:	ESRD (end stage renal disease)  Instructions for follow-up, activity and diet:	Because of the blockage in you kidney by the stone you had, you developed end stage renal disease. This is also because your left kidney is non functioning. You have required dialysis while you were admitted as you were unable to make sufficient urine. You have received a permacath while admitted so that you can continue your dialysis after you are discharged. Please go to all of your dialysis appointments. If you miss appointments, you can develop a serious arrhythmia due to electrolyte imbalance or severe shortness of breath from too much fluid.  Secondary Diagnosis:	Hypertension  Instructions for follow-up, activity and diet:	You have high blood pressure (also called hypertension). This means the force of blood against your artery walls is too strong. It also means your heart is working hard to move blood. Take your blood pressure medicine exactly as directed. Don't skip doses. Missing doses can cause your blood pressure to get out of control.  Secondary Diagnosis:	Type 2 diabetes mellitus  Instructions for follow-up, activity and diet:	You were diagnosed with diabetes prior to admission to the hospital. We have been controlling your blood sugars with insulin to ensure we have close control of the levels. Upon discharge, please take your home diabetes medications as you have before admission.  Secondary Diagnosis:	Atrial fibrillation and flutter  Instructions for follow-up, activity and diet:	You developed an arrhythmia in your heart called atrial fibrillation and atrial flutter. We have ensured that your heart rate was controlled well while you were admitted. We have also started you on an anticoagulant called warfarin (or coumadin) to prevent clots than can cause a stroke in the long run. This medication requires close monitoring of a lab value, INR for dosing. Please go regularly to get your INR checked so that the warfarin remains within a particular level.  Secondary Diagnosis:	Acute systolic congestive heart failure  Instructions for follow-up, activity and diet:	You came in with prior issues with your heart. While you were here, you have a cardiac arrest. This cardiac arrest damaged your heart further, so you now have severe dysfunction. Please follow closely with a cardiologist to monitor your heart function. Please take your medications to ensure your heart failure does not worsen or cause other problems. Principal Discharge DX:	CVA (cerebral vascular accident)  Goal:	Rehabilitation  Instructions for follow-up, activity and diet:	While in the hospital, you experienced a cerebral vascular accident with neurological defects likely related to your arrythmia in your heart. Please continue taking coumadin as directed to thin your blood and prevent future emboli from causing infarct in the brain.  Secondary Diagnosis:	NSTEMI (non-ST elevated myocardial infarction)  Goal:	Management  Instructions for follow-up, activity and diet:	You were brought to [place] [facility] for a cardiac event you had at an outside hospital that required 5 stents to be placed. You have been followed closely by cardiology while admitted to the hospital. Because you have these stents, please continue to take your daily PLAVIX and ASPIRIN to prevent clots at the stents.  Secondary Diagnosis:	ESRD (end stage renal disease)  Goal:	Management  Instructions for follow-up, activity and diet:	Because of the blockage in you kidney by the stone you had, you developed end stage renal disease. This is also because your left kidney is non functioning. You have required dialysis while you were admitted as you were unable to make sufficient urine. You have received a permacath while admitted so that you can continue your dialysis after you are discharged. Please go to all of your dialysis appointments. If you miss appointments, you can develop a serious arrhythmia due to electrolyte imbalance or severe shortness of breath from too much fluid.  Secondary Diagnosis:	Hypertension  Goal:	Management  Instructions for follow-up, activity and diet:	You have high blood pressure (also called hypertension). This means the force of blood against your artery walls is too strong. It also means your heart is working hard to move blood. Take your blood pressure medicine exactly as directed. Don't skip doses. Missing doses can cause your blood pressure to get out of control.  Secondary Diagnosis:	Type 2 diabetes mellitus  Goal:	Management  Instructions for follow-up, activity and diet:	You were diagnosed with diabetes prior to admission to the hospital. We have been controlling your blood sugars with insulin to ensure we have close control of the levels. Upon discharge, please take your home diabetes medications as you have before admission.  Secondary Diagnosis:	Atrial fibrillation and flutter  Goal:	Management  Instructions for follow-up, activity and diet:	You developed an arrhythmia in your heart called atrial fibrillation and atrial flutter. We have ensured that your heart rate was controlled well while you were admitted. We have also started you on an anticoagulant called warfarin (or coumadin) to prevent clots than can cause a stroke in the long run. This medication requires close monitoring of a lab value, INR for dosing. Please go regularly to get your INR checked so that the warfarin remains within a particular level.  Secondary Diagnosis:	Acute systolic congestive heart failure  Goal:	Management  Instructions for follow-up, activity and diet:	You came in with prior issues with your heart. While you were here, you have a cardiac arrest. This cardiac arrest damaged your heart further, so you now have severe dysfunction. Please follow closely with a cardiologist to monitor your heart function. Please take your medications to ensure your heart failure does not worsen or cause other problems.

## 2017-09-22 NOTE — DIETITIAN INITIAL EVALUATION ADULT. - ENERGY NEEDS
Height: 68 inches, Weight:179 pounds (noted wt fluctuation 186.2lbs(9/20)-177.4lbs(today) fluid shift?  BMI: 27 kg/m2 IBW: 154 pounds (+/-10%), %IBW: 116%  Pertinent Info: Per chart, 71 y/o Ghanaian speaking male with PMH of CAD s/p 12-14 stents, T2DM, HTN, HLD, CVA 4 years ago, COPD, ESRD started on HD this admission, admitted with NSTEMI s/p impella assisted LHC (1 week ago), s/p cardiac arrest/v tach and ROSC, hypoxic respiratory distress 2/2 pulmonary edema and HTN emergency, intubated and sedated, s/p Code Stroke today, on bedside HD (9/21) off sedation and possible CPAP trial today per RN. No edema, no pressure ulcers noted at this time.

## 2017-09-22 NOTE — DISCHARGE NOTE ADULT - SECONDARY DIAGNOSIS.
NSTEMI (non-ST elevated myocardial infarction) ESRD (end stage renal disease) Hypertension Type 2 diabetes mellitus Atrial fibrillation and flutter Acute systolic congestive heart failure

## 2017-09-22 NOTE — DISCHARGE NOTE ADULT - CARE PROVIDER_API CALL
Veronica Prado), Internal Medicine  [address] [place] [address]  Phone: [phone]  Fax: [phone]    Raji Hinojosa), Cardiology Medicine  [address] [place] [address]  Phone: [phone]  Fax: [phone]

## 2017-09-22 NOTE — DIETITIAN INITIAL EVALUATION ADULT. - OTHER INFO
Pt seen for LOS in CCU. Limited subjective information obtained at this time as pt intubated with no family at bedside. Pt had fair to good po intakes in-house prior to being intubated, noted % po intakes per flowsheet. No acute GI distress reported, +BM 9/20. Per chart, pt with T2DM, well controlled with HgbA1c 6.5% on Janumet and Glimepiride per H&P.

## 2017-09-22 NOTE — CONSULT NOTE ADULT - ASSESSMENT
73YO [place] speaking male with CAD s/p 12-14  stents placed [id number], DM. HTN HLD, CKD, bipolar transferred 9/12 for cardiac cath s/p 4-5 stents and s/p code blue today after 18 minutes of CPR/compressions, with code stroke called for decreased movement on R side. Primary team unable to provide accurate last known normal, per family he was "normal" at some point before being intubated. Currently pt intubated with behavioral issues, limiting exam. Appears to have more spontaneous movement on the L side overall. Changes may be due to acute CVA- pt had episode of hypotension / anoxia for 18 min during CPR, vs cardiac source for embolus.   CT Head shows no acute intracranial changes.          - MRI Brain, MRA Head/Neck when stable   - can Rp Head CT within 24-48 hrs if unable to obtain MRI Brain   - c/w dual antiplatelet as per cards   - will continue to follow up imaging

## 2017-09-22 NOTE — PROCEDURE NOTE - NSINFORMCONSENT_GEN_A_CORE
Jose Lara/Benefits, risks, and possible complications of procedure explained to patient/caregiver who verbalized understanding and gave verbal consent.
from family/Benefits, risks, and possible complications of procedure explained to patient/caregiver who verbalized understanding and gave verbal consent.

## 2017-09-22 NOTE — STROKE CODE NOTE - NIH STROKE SCALE: 1A. LEVEL OF CONSCIOUSNESS, QM
(1) Not alert; but arousable by minor stimulation to obey, answer, or respond
(1) Not alert; but arousable by minor stimulation to obey, answer, or respond

## 2017-09-22 NOTE — PROGRESS NOTE ADULT - SUBJECTIVE AND OBJECTIVE BOX
PATIENT:  JIMI BUCHANAN  [id number]    CHIEF COMPLAINT:  Patient is a 72y old  Male who presents with a chief complaint of back pain sent by [facility] for NSTEMI in hospital.      INTERVAL HISTORY:  Patient seen and examined at bedside. Patient is intubated and sedated, unable to obtain ros.     MEDICATIONS  (STANDING):  aspirin enteric coated 81 milliGRAM(s) Oral daily  tamsulosin 0.4 milliGRAM(s) Oral at bedtime  clopidogrel Tablet 75 milliGRAM(s) Oral daily  cefTRIAXone   IVPB 1 Gram(s) IV Intermittent every 24 hours  busPIRone 5 milliGRAM(s) Oral two times a day  cefTRIAXone   IVPB      atorvastatin 80 milliGRAM(s) Oral at bedtime  sevelamer hydrochloride 1600 milliGRAM(s) Oral three times a day with meals  heparin  Injectable 5000 Unit(s) SubCutaneous every 8 hours  propofol Infusion 10 MICROgram(s)/kG/Min (4.896 mL/Hr) IV Continuous <Continuous>  pantoprazole  Injectable 40 milliGRAM(s) IV Push once  norepinephrine Infusion 0.05 MICROgram(s)/kG/Min (7.65 mL/Hr) IV Continuous <Continuous>  fentaNYL   Infusion 1 MICROgram(s)/kG/Hr (4.08 mL/Hr) IV Continuous <Continuous>  amiodarone Infusion 1 mG/Min (33.333 mL/Hr) IV Continuous <Continuous>  amiodarone Infusion 0.5 mG/Min (16.667 mL/Hr) IV Continuous <Continuous>  pantoprazole  Injectable 40 milliGRAM(s) IV Push daily  insulin Infusion 2 Unit(s)/Hr (2 mL/Hr) IV Continuous <Continuous>      OBJECTIVE:  ICU Vital Signs Last 24 Hrs  T(C): 36.4 (22 Sep 2017 06:00), Max: 37.3 (21 Sep 2017 18:20)  T(F): 97.6 (22 Sep 2017 06:00), Max: 99.2 (21 Sep 2017 18:20)  HR: 68 (22 Sep 2017 06:00) (68 - 122)  BP: 109/57 (22 Sep 2017 06:00) (86/52 - 168/87)  BP(mean): 67 (22 Sep 2017 06:00) (62 - 117)  ABP: --  ABP(mean): --  RR: 12 (22 Sep 2017 06:00) (12 - 32)  SpO2: 100% (22 Sep 2017 06:00) (96% - 100%)    Mode: AC/ CMV (Assist Control/ Continuous Mandatory Ventilation)  RR (machine): 12  TV (machine): 500  FiO2: 70  PEEP: 5  ITime: 1  MAP: 11  PIP: 19    CAPILLARY BLOOD GLUCOSE  317 (22 Sep 2017 06:00)  377 (22 Sep 2017 01:50)  377 (22 Sep 2017 00:41)  118 (21 Sep 2017 20:38)  137 (21 Sep 2017 16:05)  276 (21 Sep 2017 11:22)  132 (21 Sep 2017 07:44)        CAPILLARY BLOOD GLUCOSE  317 (22 Sep 2017 06:00)          I&O's Summary    20 Sep 2017 07:01  -  21 Sep 2017 07:00  --------------------------------------------------------  IN: 1560 mL / OUT: 1834 mL / NET: -274 mL    21 Sep 2017 07:01  -  22 Sep 2017 06:48  --------------------------------------------------------  IN: 2283.7 mL / OUT: 4165 mL / NET: -1881.3 mL      Daily     Daily Weight in k.5 (22 Sep 2017 00:35)    PHYSICAL EXAM:  General: Intubated, sedated, easily awake    HEENT: ET tubes in place. R SC shiley L IJ triple lumen.    CVS: RRR, nl S1, S2, no murmurs, gallops, regurg  Pulm: Lungs CTA b/l anteriorly  GI: Nl BS, soft, nontender, nondistended  Ext: +2 Peripheral pulses, no edema, cyanosis, clubbing  Neuro: Pt spontaneously moving LLE and LUE but not RUE or RLE     TELEMETRY:   NSR, no vtach sicne admission to ccu     EKG:     IMAGING:  CXR:   LABS:  ABG - ( 22 Sep 2017 00:25 )  pH: 7.39  /  pCO2: 34    /  pO2: 374   / HCO3: 20    / Base Excess: -3.8  /  SaO2: 100                                     8.5    28.2  )-----------( 166      ( 22 Sep 2017 04:17 )             25.1         135  |  96  |  33<H>  ----------------------------<  328<H>  4.6   |  20<L>  |  3.84<H>    Ca    9.0      22 Sep 2017 04:17  Phos  4.5       Mg     2.0         TPro  7.4  /  Alb  3.3  /  TBili  0.5  /  DBili  x   /  AST  25  /  ALT  20  /  AlkPhos  86      LIVER FUNCTIONS - ( 21 Sep 2017 23:04 )  Alb: 3.3 g/dL / Pro: 7.4 g/dL / ALK PHOS: 86 U/L / ALT: 20 U/L RC / AST: 25 U/L / GGT: x           PT/INR - ( 22 Sep 2017 04:17 )   PT: 15.9 sec;   INR: 1.46 ratio         PTT - ( 22 Sep 2017 04:17 )  PTT:94.7 sec  CKMB Units: 4.9 ng/mL ( @ 00:31)  Creatine Kinase, Serum: 160 U/L ( @ 00:31)  Troponin T, Serum: 1.98 ng/mL ( @ 00:31)  Creatine Kinase, Serum: 54 U/L ( @ 23:04)  CKMB Units: 2.6 ng/mL ( @ 23:04)  Troponin T, Serum: 1.96 ng/mL ( @ 23:04) PATIENT:  JIMI BUCHANAN  [id number]    CHIEF COMPLAINT:  Patient is a 72y old  Male who presents with a chief complaint of back pain sent by [facility] for NSTEMI in hospital.      INTERVAL HISTORY:  Patient seen and examined at bedside. Patient is intubated and sedated, unable to obtain ros.     MEDICATIONS  (STANDING):  aspirin enteric coated 81 milliGRAM(s) Oral daily  tamsulosin 0.4 milliGRAM(s) Oral at bedtime  clopidogrel Tablet 75 milliGRAM(s) Oral daily  cefTRIAXone   IVPB 1 Gram(s) IV Intermittent every 24 hours  busPIRone 5 milliGRAM(s) Oral two times a day  cefTRIAXone   IVPB      atorvastatin 80 milliGRAM(s) Oral at bedtime  sevelamer hydrochloride 1600 milliGRAM(s) Oral three times a day with meals  heparin  Injectable 5000 Unit(s) SubCutaneous every 8 hours  propofol Infusion 10 MICROgram(s)/kG/Min (4.896 mL/Hr) IV Continuous <Continuous>  pantoprazole  Injectable 40 milliGRAM(s) IV Push once  norepinephrine Infusion 0.05 MICROgram(s)/kG/Min (7.65 mL/Hr) IV Continuous <Continuous>  fentaNYL   Infusion 1 MICROgram(s)/kG/Hr (4.08 mL/Hr) IV Continuous <Continuous>  amiodarone Infusion 1 mG/Min (33.333 mL/Hr) IV Continuous <Continuous>  amiodarone Infusion 0.5 mG/Min (16.667 mL/Hr) IV Continuous <Continuous>  pantoprazole  Injectable 40 milliGRAM(s) IV Push daily  insulin Infusion 2 Unit(s)/Hr (2 mL/Hr) IV Continuous <Continuous>      OBJECTIVE:  ICU Vital Signs Last 24 Hrs  T(C): 36.4 (22 Sep 2017 06:00), Max: 37.3 (21 Sep 2017 18:20)  T(F): 97.6 (22 Sep 2017 06:00), Max: 99.2 (21 Sep 2017 18:20)  HR: 68 (22 Sep 2017 06:00) (68 - 122)  BP: 109/57 (22 Sep 2017 06:00) (86/52 - 168/87)  BP(mean): 67 (22 Sep 2017 06:00) (62 - 117)  ABP: --  ABP(mean): --  RR: 12 (22 Sep 2017 06:00) (12 - 32)  SpO2: 100% (22 Sep 2017 06:00) (96% - 100%)    Mode: AC/ CMV (Assist Control/ Continuous Mandatory Ventilation)  RR (machine): 12  TV (machine): 500  FiO2: 70  PEEP: 5  ITime: 1  MAP: 11  PIP: 19    CAPILLARY BLOOD GLUCOSE  317 (22 Sep 2017 06:00)  377 (22 Sep 2017 01:50)  377 (22 Sep 2017 00:41)  118 (21 Sep 2017 20:38)  137 (21 Sep 2017 16:05)  276 (21 Sep 2017 11:22)  132 (21 Sep 2017 07:44)        CAPILLARY BLOOD GLUCOSE  317 (22 Sep 2017 06:00)          I&O's Summary    20 Sep 2017 07:01  -  21 Sep 2017 07:00  --------------------------------------------------------  IN: 1560 mL / OUT: 1834 mL / NET: -274 mL    21 Sep 2017 07:01  -  22 Sep 2017 06:48  --------------------------------------------------------  IN: 2283.7 mL / OUT: 4165 mL / NET: -1881.3 mL      Daily     Daily Weight in k.5 (22 Sep 2017 00:35)    PHYSICAL EXAM:  General: Intubated, sedated, easily awake    HEENT: ET tubes in place. R SC shiley L IJ triple lumen.    CVS: RRR, nl S1, S2, no murmurs, gallops, regurg  Pulm: Lungs CTA b/l anteriorly  GI: Nl BS, soft, nontender, nondistended  Ext: +2 Peripheral pulses, no edema, cyanosis, clubbing  Neuro: Pt spontaneously moving LLE and LUE but not RUE or RLE overnight, no movement this morning as pt sedated     TELEMETRY:   NSR, no vtach sicne admission to ccu     EKG:  NSR, poor R wave progression in lateral leads,     IMAGING:  CXR:   LABS:  ABG - ( 22 Sep 2017 00:25 )  pH: 7.39  /  pCO2: 34    /  pO2: 374   / HCO3: 20    / Base Excess: -3.8  /  SaO2: 100                                     8.5    28.2  )-----------( 166      ( 22 Sep 2017 04:17 )             25.1         135  |  96  |  33<H>  ----------------------------<  328<H>  4.6   |  20<L>  |  3.84<H>    Ca    9.0      22 Sep 2017 04:17  Phos  4.5       Mg     2.0         TPro  7.4  /  Alb  3.3  /  TBili  0.5  /  DBili  x   /  AST  25  /  ALT  20  /  AlkPhos  86      LIVER FUNCTIONS - ( 21 Sep 2017 23:04 )  Alb: 3.3 g/dL / Pro: 7.4 g/dL / ALK PHOS: 86 U/L / ALT: 20 U/L RC / AST: 25 U/L / GGT: x           PT/INR - ( 22 Sep 2017 04:17 )   PT: 15.9 sec;   INR: 1.46 ratio         PTT - ( 22 Sep 2017 04:17 )  PTT:94.7 sec  CKMB Units: 4.9 ng/mL ( @ 00:31)  Creatine Kinase, Serum: 160 U/L ( @ 00:31)  Troponin T, Serum: 1.98 ng/mL ( @ 00:31)  Creatine Kinase, Serum: 54 U/L ( @ 23:04)  CKMB Units: 2.6 ng/mL ( @ 23:04)  Troponin T, Serum: 1.96 ng/mL ( @ 23:04) PATIENT:  JIMI BUCHANAN  [id number]    CHIEF COMPLAINT:  Patient is a 72y old  Male who presents with a chief complaint of back pain sent by [facility] for NSTEMI in hospital.      INTERVAL HISTORY:  Patient seen and examined at bedside. Patient is intubated and sedated, unable to obtain ros.     MEDICATIONS  (STANDING):  aspirin enteric coated 81 milliGRAM(s) Oral daily  tamsulosin 0.4 milliGRAM(s) Oral at bedtime  clopidogrel Tablet 75 milliGRAM(s) Oral daily  cefTRIAXone   IVPB 1 Gram(s) IV Intermittent every 24 hours  busPIRone 5 milliGRAM(s) Oral two times a day  cefTRIAXone   IVPB      atorvastatin 80 milliGRAM(s) Oral at bedtime  sevelamer hydrochloride 1600 milliGRAM(s) Oral three times a day with meals  heparin  Injectable 5000 Unit(s) SubCutaneous every 8 hours  propofol Infusion 10 MICROgram(s)/kG/Min (4.896 mL/Hr) IV Continuous <Continuous>  pantoprazole  Injectable 40 milliGRAM(s) IV Push once  norepinephrine Infusion 0.05 MICROgram(s)/kG/Min (7.65 mL/Hr) IV Continuous <Continuous>  fentaNYL   Infusion 1 MICROgram(s)/kG/Hr (4.08 mL/Hr) IV Continuous <Continuous>  amiodarone Infusion 1 mG/Min (33.333 mL/Hr) IV Continuous <Continuous>  amiodarone Infusion 0.5 mG/Min (16.667 mL/Hr) IV Continuous <Continuous>  pantoprazole  Injectable 40 milliGRAM(s) IV Push daily  insulin Infusion 2 Unit(s)/Hr (2 mL/Hr) IV Continuous <Continuous>      OBJECTIVE:  ICU Vital Signs Last 24 Hrs  T(C): 36.4 (22 Sep 2017 06:00), Max: 37.3 (21 Sep 2017 18:20)  T(F): 97.6 (22 Sep 2017 06:00), Max: 99.2 (21 Sep 2017 18:20)  HR: 68 (22 Sep 2017 06:00) (68 - 122)  BP: 109/57 (22 Sep 2017 06:00) (86/52 - 168/87)  BP(mean): 67 (22 Sep 2017 06:00) (62 - 117)  ABP: --  ABP(mean): --  RR: 12 (22 Sep 2017 06:00) (12 - 32)  SpO2: 100% (22 Sep 2017 06:00) (96% - 100%)    Mode: AC/ CMV (Assist Control/ Continuous Mandatory Ventilation)  RR (machine): 12  TV (machine): 500  FiO2: 70  PEEP: 5  ITime: 1  MAP: 11  PIP: 19    CAPILLARY BLOOD GLUCOSE  317 (22 Sep 2017 06:00)  377 (22 Sep 2017 01:50)  377 (22 Sep 2017 00:41)  118 (21 Sep 2017 20:38)  137 (21 Sep 2017 16:05)  276 (21 Sep 2017 11:22)  132 (21 Sep 2017 07:44)        CAPILLARY BLOOD GLUCOSE  317 (22 Sep 2017 06:00)          I&O's Summary    20 Sep 2017 07:01  -  21 Sep 2017 07:00  --------------------------------------------------------  IN: 1560 mL / OUT: 1834 mL / NET: -274 mL    21 Sep 2017 07:01  -  22 Sep 2017 06:48  --------------------------------------------------------  IN: 2283.7 mL / OUT: 4165 mL / NET: -1881.3 mL      Daily     Daily Weight in k.5 (22 Sep 2017 00:35)    PHYSICAL EXAM:  General: Intubated, sedated, easily awake    HEENT: ET tubes in place. R SC shiley L IJ triple lumen.    CVS: RRR, nl S1, S2, no murmurs, gallops, regurg  Pulm: Lungs CTA b/l anteriorly  GI: Nl BS, soft, nontender, nondistended  : james in place. L groin ecchymosis, appears stable.   Ext: +2 Peripheral pulses, no edema, cyanosis, clubbing  Neuro: Pt spontaneously moving LLE and LUE but not RUE or RLE overnight, no movement this morning as pt sedated     TELEMETRY:   NSR, no vtach sicne admission to ccu     EKG:  NSR, poor R wave progression in lateral leads,     IMAGING:  CXR:   LABS:  ABG - ( 22 Sep 2017 00:25 )  pH: 7.39  /  pCO2: 34    /  pO2: 374   / HCO3: 20    / Base Excess: -3.8  /  SaO2: 100                                     8.5    28.2  )-----------( 166      ( 22 Sep 2017 04:17 )             25.1         135  |  96  |  33<H>  ----------------------------<  328<H>  4.6   |  20<L>  |  3.84<H>    Ca    9.0      22 Sep 2017 04:17  Phos  4.5       Mg     2.0         TPro  7.4  /  Alb  3.3  /  TBili  0.5  /  DBili  x   /  AST  25  /  ALT  20  /  AlkPhos  86      LIVER FUNCTIONS - ( 21 Sep 2017 23:04 )  Alb: 3.3 g/dL / Pro: 7.4 g/dL / ALK PHOS: 86 U/L / ALT: 20 U/L RC / AST: 25 U/L / GGT: x           PT/INR - ( 22 Sep 2017 04:17 )   PT: 15.9 sec;   INR: 1.46 ratio         PTT - ( 22 Sep 2017 04:17 )  PTT:94.7 sec  CKMB Units: 4.9 ng/mL ( @ 00:31)  Creatine Kinase, Serum: 160 U/L ( @ 00:31)  Troponin T, Serum: 1.98 ng/mL ( @ 00:31)  Creatine Kinase, Serum: 54 U/L ( @ 23:04)  CKMB Units: 2.6 ng/mL ( @ 23:04)  Troponin T, Serum: 1.96 ng/mL ( @ 23:04)

## 2017-09-22 NOTE — DISCHARGE NOTE ADULT - HOSPITAL COURSE
History of present illness:  73 yo English speaking M w pmhx of CAD s/p 14(?) stents in 3086-2869, CKD stage I 2mo ago w acute progression to ESRD on HD this admission ([facility]), T2DM (A1C 6.5), COPD, CVA 4 years ago (family denies and states no deficits at baseline), Bipolar Disorder (not on meds)  initially presented to OSH with 2 days of right flank pain radiating to the groin associated with chills. Patient was admitted for pyelonephritis and started on ceftriaxone. Patient has a hx of chronic kidney stones and has had urethral stents in the past. Initially patient had LIN presumably secondary to post obstructive nephropathy but refused james/nephrostomy. Patient subsequently stopped making urine and was started on HD. Hospital course was complicated by NSTEMI with TWI in precordial and lateral leads and troponin I 0.120, 0.193. Patient was started on heparin gtt, loaded with aspirin, plavix. EF on TTE was 35%. Patient had hypoxic respiratory distress from pulmonary edema in the setting of acute NSTEMI requiring intubation for 1 day and ICU monitoring for 3-4 days at the outside hospital. Patient was transferred to [facility] for PCI on Sep 12.     Hospital course:  At [facility] patient underwent LHC with impella assistance and had BABAR x 3 to prox/mid/distal RCA and BABAR x 2 to prox/mid LAD. Patient was continued on ceftriaxone and received HD almost daily. Hospital course was complicated by intermittent episodes of SOB resolving with brief NRB treatment. Shiley was removed for replacement on 9/19 due to difficulty with HD however, patient refused new shiley placement. At that time, potassium was uptrending into high 6 but patient absolutely refused HD and new catheter placement. On 9/20, patient developed new BRBPR episode. He was agreeable to shiley, and had it placed with subsequent HD. Patient syncopized during the HD and require fluid return. During this time, he developed new episode of afib/aflutter. He was given half a unit of blood. On 21st patient had no BRBPR and Hb stabilized. He had HD with 2L removed. Patient was also started on hydralazine and heparin sq.     While on the Medicine floor, shortly after an HD session, pt was noted to become unresponsive with low systolic BP before becoming bradycardic. Shortly after, the patient went into cardiac arrest. He was coded for ~ 20 minutes before ROSC, during which and after he was noted to have VT. He was given a bolus of Amiodarone followed by infusion. Patient was also noted to exhibit apparent motor weakness in the RUE/RLE. A Code Stroke was called, and a subsequent head CT showed multiple areas of hypodensities within the bilateral cerebral and   cerebellar hemispheres concerning for embolic infarct. Patient was transferred to the CCU for further management after cardiac arrest.    While in the CCU, patient was started on a Heparin gtt given his A-fib and evidence of possible embolic infarcts on head CT. Goal pTT was 50-70 per Neuro's recommendations. Patient was continued on DAPT and Lipitor as well. TTE was obtained on 9/22 which showed severe segmental LV systolic dysfunction (EF ~ 20%). Patient's blood pressure improved and his rate was grossly controlled while on an Amiodarone gtt. Patient's respiratory status also improved and he was able to be extubated on 9/25. Patient has been breathing comfortably and saturating well on RA since that time. After extubation, patient has been seen to move his LUE on command and is able to move his proximal LLE. However, no movement has been seen of his RUE/RLE. Patient has also remained grossly nonverbal since extubation, though he is alert and able to follow some commands. A repeat head CT was obtained on day of transfer which showed no evidence for hemorrhagic transformation. or acute intracranial hemorrhage. In addition, patient has continued to undergo HD per Renal recs. After d/w Renal, pt will likely require a Permacath placement. Patient was started on Lopressor 25mg BID; however, starting patient on an ACEi/ARB has been held for now per Renal recs. Patient is currently NPO pending a formal S+S evaluation, which is pending. An NGT is currently in place. Amiodarone was transitioned to PO and plan is for a 10g load. Patient had a repeat TTE prior to transfer which found an EF of 20% as well. EP was consulted for an ICD evaluation and are currently following. Plan is to bridge patient from Heparin gtt to Coumadin. Patient is ordered for his first dose of Coumadin on night of transfer. Of note, patient's Hgb has been around 7-8 recently. His hospital course had been c/b a left groin hematoma prior to transferring to the CCU; however, a repeat CT A+P showed a decrease in size of the hematoma as well as no evidence of an RP bleed. Patient has had no overt signs of bleeding clinically. Patient is currently HDS and is medically stable for transfer to telemetry. History of present illness:  71 yo Japanese speaking M w pmhx of CAD s/p 14(?) stents in 3833-5587, CKD stage I 2mo ago w acute progression to ESRD on HD this admission ([facility]), T2DM (A1C 6.5), COPD, CVA 4 years ago (family denies and states no deficits at baseline), Bipolar Disorder (not on meds)  initially presented to OSH with 2 days of right flank pain radiating to the groin associated with chills. Patient was admitted for pyelonephritis and started on ceftriaxone. Patient has a hx of chronic kidney stones and has had urethral stents in the past. Initially patient had LIN presumably secondary to post obstructive nephropathy but refused james/nephrostomy. Patient subsequently stopped making urine and was started on HD. Hospital course was complicated by NSTEMI with TWI in precordial and lateral leads and troponin I 0.120, 0.193. Patient was started on heparin gtt, loaded with aspirin, plavix. EF on TTE was 35%. Patient had hypoxic respiratory distress from pulmonary edema in the setting of acute NSTEMI requiring intubation for 1 day and ICU monitoring for 3-4 days at the outside hospital. Patient was transferred to [facility] for PCI on Sep 12.     Hospital course:  At [facility] patient underwent LHC with impella assistance and had BABAR x 3 to prox/mid/distal RCA and BABAR x 2 to prox/mid LAD. Patient was continued on ceftriaxone and received HD almost daily. Hospital course was complicated by intermittent episodes of SOB resolving with brief NRB treatment. Shiley was removed for replacement on 9/19 due to difficulty with HD however, patient refused new shiley placement. At that time, potassium was uptrending into high 6 but patient absolutely refused HD and new catheter placement. On 9/20, patient developed new BRBPR episode. He was agreeable to shiley, and had it placed with subsequent HD. Patient syncopized during the HD and require fluid return. During this time, he developed new episode of afib/aflutter. He was given half a unit of blood. On 21st patient had no BRBPR and Hb stabilized. He had HD with 2L removed. Patient was also started on hydralazine and heparin sq.     While on the Medicine floor, shortly after an HD session, pt was noted to become unresponsive with low systolic BP before becoming bradycardic. Shortly after, the patient went into cardiac arrest. He was coded for ~ 20 minutes before ROSC, during which and after he was noted to have VT. He was given a bolus of Amiodarone followed by infusion. Patient was also noted to exhibit apparent motor weakness in the RUE/RLE. A Code Stroke was called, and a subsequent head CT showed multiple areas of hypodensities within the bilateral cerebral and   cerebellar hemispheres concerning for embolic infarct. Patient was transferred to the CCU for further management after cardiac arrest.    While in the CCU, patient was started on a Heparin gtt given his A-fib and evidence of possible embolic infarcts on head CT. Goal pTT was 50-70 per Neuro's recommendations. Patient was continued on DAPT and Lipitor as well. TTE was obtained on 9/22 which showed severe segmental LV systolic dysfunction (EF ~ 20%). Patient's blood pressure improved and his rate was grossly controlled while on an Amiodarone gtt. Patient's respiratory status also improved and he was able to be extubated on 9/25. Patient has been breathing comfortably and saturating well on RA since that time. After extubation, patient has been seen to move his LUE on command and is able to move his proximal LLE. However, no movement has been seen of his RUE/RLE. Patient has also remained grossly nonverbal since extubation, though he is alert and able to follow some commands. A repeat head CT was obtained on day of transfer which showed no evidence for hemorrhagic transformation. or acute intracranial hemorrhage. In addition, patient has continued to undergo HD per Renal recs. After d/w Renal, pt will likely require a Permacath placement. Patient was started on Lopressor 25mg BID; however, starting patient on an ACEi/ARB has been held for now per Renal recs. Patient is currently NPO pending a formal S+S evaluation, which is pending. An NGT is currently in place. Amiodarone was transitioned to PO and plan is for a 10g load. Patient had a repeat TTE prior to transfer which found an EF of 20% as well. EP was consulted for an ICD evaluation and are currently following. Plan is to bridge patient from Heparin gtt to Coumadin. Patient is ordered for his first dose of Coumadin on night of transfer. Of note, patient's Hgb has been around 7-8 recently. His hospital course had been c/b a left groin hematoma prior to transferring to the CCU; however, a repeat CT A+P showed a decrease in size of the hematoma as well as no evidence of an RP bleed. Patient has had no overt signs of bleeding clinically. Patient is HDS and is medically stable for transfer to telemetry.    The patient was transferred back to the floors on 9/29. Speech and swallow eval determined a dysphagia 1 diet with honey liquids. Palliative care was consulted. A meeting was arranged with the patient's daughter, HCP. Patient was made DNR/DNI and the expressed wishes to continue for rehab and continued hemodialysis. IR was consulted for permacath placement, pending INR<1.5. Coumadin was held. Permacath in Right thorax was placed by IR. Pt continued to received HD on MWF. Coumadin was restarted for goal INR of 2-3. PPM placement was discussed with daughter, and she expressed wanting to wait until he is stronger. Coumadin was held for PPM placement by EP. History of present illness:  73 yo Armenian speaking M w pmhx of CAD s/p 14(?) stents in 6219-5861, CKD stage I 2mo ago w acute progression to ESRD on HD this admission ([facility]), T2DM (A1C 6.5), COPD, CVA 4 years ago (family denies and states no deficits at baseline), Bipolar Disorder (not on meds)  initially presented to OSH with 2 days of right flank pain radiating to the groin associated with chills. Patient was admitted for pyelonephritis and started on ceftriaxone. Patient has a hx of chronic kidney stones and has had urethral stents in the past. Initially patient had LIN presumably secondary to post obstructive nephropathy but refused james/nephrostomy. Patient subsequently stopped making urine and was started on HD. Hospital course was complicated by NSTEMI with TWI in precordial and lateral leads and troponin I 0.120, 0.193. Patient was started on heparin gtt, loaded with aspirin, plavix. EF on TTE was 35%. Patient had hypoxic respiratory distress from pulmonary edema in the setting of acute NSTEMI requiring intubation for 1 day and ICU monitoring for 3-4 days at the outside hospital. Patient was transferred to [facility] for PCI on Sep 12.     Hospital course:  At [facility] patient underwent LHC with impella assistance and had BABAR x 3 to prox/mid/distal RCA and BABAR x 2 to prox/mid LAD. Patient was continued on ceftriaxone and received HD almost daily. Hospital course was complicated by intermittent episodes of SOB resolving with brief NRB treatment. Shiley was removed for replacement on 9/19 due to difficulty with HD however, patient refused new shiley placement. At that time, potassium was uptrending into high 6 but patient absolutely refused HD and new catheter placement. On 9/20, patient developed new BRBPR episode. He was agreeable to shiley, and had it placed with subsequent HD. Patient syncopized during the HD and require fluid return. During this time, he developed new episode of afib/aflutter. He was given half a unit of blood. On 21st patient had no BRBPR and Hb stabilized. He had HD with 2L removed. Patient was also started on hydralazine and heparin sq.     While on the Medicine floor, shortly after an HD session, pt was noted to become unresponsive with low systolic BP before becoming bradycardic. Shortly after, the patient went into cardiac arrest. He was coded for ~ 20 minutes before ROSC, during which and after he was noted to have VT. He was given a bolus of Amiodarone followed by infusion. Patient was also noted to exhibit apparent motor weakness in the RUE/RLE. A Code Stroke was called, and a subsequent head CT showed multiple areas of hypodensities within the bilateral cerebral and   cerebellar hemispheres concerning for embolic infarct. Patient was transferred to the CCU for further management after cardiac arrest.    While in the CCU, patient was started on a Heparin gtt given his A-fib and evidence of possible embolic infarcts on head CT. Goal pTT was 50-70 per Neuro's recommendations. Patient was continued on DAPT and Lipitor as well. TTE was obtained on 9/22 which showed severe segmental LV systolic dysfunction (EF ~ 20%). Patient's blood pressure improved and his rate was grossly controlled while on an Amiodarone gtt. Patient's respiratory status also improved and he was able to be extubated on 9/25. Patient has been breathing comfortably and saturating well on RA since that time. After extubation, patient has been seen to move his LUE on command and is able to move his proximal LLE. However, no movement has been seen of his RUE/RLE. Patient has also remained grossly nonverbal since extubation, though he is alert and able to follow some commands. A repeat head CT was obtained on day of transfer which showed no evidence for hemorrhagic transformation. or acute intracranial hemorrhage. In addition, patient has continued to undergo HD per Renal recs. After d/w Renal, pt will likely require a Permacath placement. Patient was started on Lopressor 25mg BID; however, starting patient on an ACEi/ARB has been held for now per Renal recs. Patient is currently NPO pending a formal S+S evaluation, which is pending. An NGT is currently in place. Amiodarone was transitioned to PO and plan is for a 10g load. Patient had a repeat TTE prior to transfer which found an EF of 20% as well. EP was consulted for an ICD evaluation and are currently following. Plan is to bridge patient from Heparin gtt to Coumadin. Patient is ordered for his first dose of Coumadin on night of transfer. Of note, patient's Hgb has been around 7-8 recently. His hospital course had been c/b a left groin hematoma prior to transferring to the CCU; however, a repeat CT A+P showed a decrease in size of the hematoma as well as no evidence of an RP bleed. Patient has had no overt signs of bleeding clinically. Patient is HDS and is medically stable for transfer to telemetry.    The patient was transferred back to the floors on 9/29. Speech and swallow eval determined a dysphagia 1 diet with honey liquids. Palliative care was consulted. A meeting was arranged with the patient's daughter, HCP. Patient was made DNR/DNI and the expressed wishes to continue for rehab and continued hemodialysis. IR was consulted for permacath placement, pending INR<1.5. Coumadin was held. Permacath in Right thorax was placed by IR. Pt continued to received HD on MWF. Coumadin was restarted for goal INR of 2-3. PPM placement was discussed with daughter, and she expressed wanting to wait until he is stronger. PPM declined by daughter later.

## 2017-09-22 NOTE — DIETITIAN INITIAL EVALUATION ADULT. - PERTINENT LABORATORY DATA
Na 135 [135 - 145], K+ 4.7 [3.5 - 5.3], BUN 39 [7 - 23], Cr 4.61 [0.50 - 1.30],  [70 - 99], Phos 6.4 [2.5 - 4.5], Alk Phos 83 [40 - 120],  [10 - 40],  [10 - 45], Mg 2.3 [1.6 - 2.6], Ca 8.9 [8.4 - 10.5], HbA1c 6.5%; [employer] (9/20-22) [phone]

## 2017-09-22 NOTE — DISCHARGE NOTE ADULT - OTHER SIGNIFICANT FINDINGS
EXAM:  CT BRAIN                            PROCEDURE DATE:  09/27/2017            INTERPRETATION:  Noncontrast CT of the brain.    CLINICAL INDICATION: Follow-up acute multifocal infarcts.    TECHNIQUE : Axial CT scanning of the brain was obtained from the skull   base to the vertex without the administration of intravenous contrast.      COMPARISON: CT brain 9/23/2017 at 9/22/2017    FINDINGS:  Redemonstration of acute infarct involving the bilateral   inferior cerebellar hemispheres, right greater than left parietal lobes,   and posterior right temporal lobe. No CT evidence for hemorrhagic   transformation. No acute intracranial hemorrhage.    Chronic right occipital and bilateral basal ganglia infarcts are again   seen.    IMPRESSION: Redemonstration of acute infarct involving the bilateral   inferior cerebellar hemispheres, right greater than left parietal lobes,   and posterior right temporal lobe. No CT evidence for hemorrhagic   transformation. No acute intracranial hemorrhage.    Chronic right occipital and bilateral basal ganglia infarcts are again   seen.

## 2017-09-22 NOTE — PROCEDURE NOTE - NSPROCDETAILS_GEN_ALL_CORE
ultrasound guidance/guidewire recovered/lumen(s) aspirated and flushed/sterile dressing applied/sterile technique, catheter placed

## 2017-09-22 NOTE — STROKE CODE NOTE - NIH STROKE SCALE: 8. SENSORY, QM
(0) Normal; no sensory loss
(1) Mild-to-moderate sensory loss; patient feels pinprick is less sharp or is dull on the affected side; or there is a loss of superficial pain with pinprick, but patient is aware of being touched

## 2017-09-22 NOTE — DISCHARGE NOTE ADULT - NS AS DC FOLLOWUP STROKE INST
Stroke (includes: TIA/SAH/ICH/Ischemic Stroke) Stroke (includes: TIA/SAH/ICH/Ischemic Stroke)/Coumadin/Warfarin

## 2017-09-22 NOTE — PROGRESS NOTE ADULT - ASSESSMENT
HD dependent LIN due to an obstructed solitary functioning Right kidney.   S/p arrest, s/p NSTEMI -- S/p impella assisted multivessel BABAR X5.   Awaiting urological intervention to decompress obstructed solitary kidney pending Cardiac stabilization.  Will plan for dialysis tomorrow via temporary access.   D/w house staff.

## 2017-09-22 NOTE — DIETITIAN INITIAL EVALUATION ADULT. - PT NOT SOURCE
Per chart, pt speaks [place], no family at bedside, intubated and off sedation today, unable to conduct interview/intubated

## 2017-09-22 NOTE — CHART NOTE - NSCHARTNOTEFT_GEN_A_CORE
====================  CCU MIDNIGHT ROUNDS  ====================    JIMI BUCHANAN  [id number]  Patient is a 72y old  Male who presents with a chief complaint of s/p cardiac cath (22 Sep 2017 12:34)      ====================  SUMMARY:  ====================    71 yo M w extensive CAD, T2DM, ESRD new HD, prior CVA who was transferred from OSH for NSTEMI s/p impella assisted LHC 1wk ago being treated for pyelonephritis with course c/b obstructive nephropathy requiring HD transferred from floor to CCU after hypotensive episode leading into sinus anthony and subsequent cardiac arrest, vtach and ROSC. CCU course complicated by focal R sided neurological deficits on admission with code stroke called, neg CT head, but decline in MS and spontaneous movements during the day. Patient failed trial of CPAP earlier today, currently off sedation or pressor support.     ====================  NEW EVENTS:  ====================    Patient remains heavily sedated with minimal response to noxious stimuli. He is overbreathing the vent and maintaining good bp without support.     ====================  VITALS (Last 12 hrs):  ====================    T(C): 36.9 (09-22-17 @ 22:00), Max: 38 (09-22-17 @ 16:30)  T(F): 98.5 (09-22-17 @ 22:00), Max: 100.4 (09-22-17 @ 16:30)  HR: 108 (09-22-17 @ 22:30) (70 - 108)  BP: --  BP(mean): --  ABP: 124/56 (09-22-17 @ 22:30) (104/46 - 148/60)  ABP(mean): 76 (09-22-17 @ 22:30) (62 - 92)  RR: 23 (09-22-17 @ 22:30) (12 - 23)  SpO2: 98% (09-22-17 @ 22:30) (93% - 100%)  Wt(kg): --  CVP(mm Hg): 2 (09-22-17 @ 22:30) (2 - 4)    I&O's Summary    21 Sep 2017 07:01  -  22 Sep 2017 07:00  --------------------------------------------------------  IN: 2335.1 mL / OUT: 4170 mL / NET: -1834.9 mL    22 Sep 2017 07:01  -  22 Sep 2017 22:54  --------------------------------------------------------  IN: 1594.5 mL / OUT: 15 mL / NET: 1579.5 mL        Mode: AC/ CMV (Assist Control/ Continuous Mandatory Ventilation)  RR (machine): 14  TV (machine): 500  FiO2: 40  PEEP: 5  ITime: 1  MAP: 9  PIP: 19      ====================  NEW LABS:  ====================                          8.4    24.4  )-----------( 151      ( 22 Sep 2017 21:57 )             25.3     09-22    134<L>  |  96  |  49<H>  ----------------------------<  110<H>  4.3   |  20<L>  |  5.70<H>    Ca    8.5      22 Sep 2017 21:57  Phos  5.9     09-22  Mg     2.0     09-22    TPro  6.4  /  Alb  3.2<L>  /  TBili  0.6  /  DBili  x   /  AST  379<H>  /  ALT  112<H>  /  AlkPhos  79  09-22    PT/INR - ( 22 Sep 2017 04:17 )   PT: 15.9 sec;   INR: 1.46 ratio         PTT - ( 22 Sep 2017 04:17 )  PTT:94.7 sec  Creatine Kinase, Serum: 865 U/L <H> (09-22-17 @ 21:57)  Troponin T, Serum: 8.14 ng/mL <H> (09-22-17 @ 21:57)  Creatine Kinase, Serum: 928 U/L <H> (09-22-17 @ 12:02)  Troponin T, Serum: 6.30 ng/mL <H> (09-22-17 @ 12:02)  Creatine Kinase, Serum: 664 U/L <H> (09-22-17 @ 04:17)  Troponin T, Serum: 3.24 ng/mL <H> (09-22-17 @ 04:17)  Creatine Kinase, Serum: 160 U/L (09-22-17 @ 00:31)  Troponin T, Serum: 1.98 ng/mL <H> (09-22-17 @ 00:31)  Creatine Kinase, Serum: 54 U/L (09-21-17 @ 23:04)  Troponin T, Serum: 1.96 ng/mL <H> (09-21-17 @ 23:04)    CKMB Units: 26.8 ng/mL (09-22 @ 21:57)  CKMB Units: 53.3 ng/mL (09-22 @ 12:02)  CKMB Units: 31.8 ng/mL (09-22 @ 04:17)  CKMB Units: 4.9 ng/mL (09-22 @ 00:31)  CKMB Units: 2.6 ng/mL (09-21 @ 23:04)    ABG - ( 22 Sep 2017 14:25 )  pH: 7.39  /  pCO2: 38    /  pO2: 139   / HCO3: 23    / Base Excess: -1.6  /  SaO2: 99                    ====================  PLAN:  ====================  Neuro:       MS not significantly improved from this morning despite being off sedation. Will maintain off sedation, neuro checks q1h.        Plan for MRI brain after more stable vs repeat CT head in am if no improvement in neuro status    Cards:     Maintain on amio gtt at 0.5 tonight - 24 hr load completed at midnight tonight; however pt npo and cannot give po maintenance. would switch to IV amio 150 bolus daily if prolonged intubation.  Monitor LFTs       Cw asa, plavix, lipitor 40.       Repeat TTE concerning for significantly decreased LV function compared to last week. Troponins uptrending.  New MI entirely possible in the setting of hypotension, poor perfusion especially given the new hydralazine dose yesterday prior to code which could have created coronary steal. At this time pt not stable for LHC, but will require a repeat PCI after more stable.         Pulm:      Minimal vent settings, overbreathing the vent, will try CPAP in the am.     GI:     Npo for now     Renal:      No urine output, worsening renal function despite lack of obstructive process as per last renal US. Plan for HD tomorrow. Maybe possible cause of currently sedative state.     Heme:    Leukocytosis - cw zosyn and vanc for now. F/u cultures. ====================  CCU MIDNIGHT ROUNDS  ====================    JIMI BUCHANAN  [id number]  Patient is a 72y old  Male who presents with a chief complaint of s/p cardiac cath (22 Sep 2017 12:34)      ====================  SUMMARY:  ====================    71 yo M w extensive CAD, T2DM, ESRD new HD, prior CVA who was transferred from OSH for NSTEMI s/p impella assisted LHC 1wk ago being treated for pyelonephritis with course c/b obstructive nephropathy requiring HD transferred from floor to CCU after hypotensive episode leading into sinus anthony and subsequent cardiac arrest, vtach and ROSC. CCU course complicated by focal R sided neurological deficits on admission with code stroke called, neg CT head, but decline in MS and spontaneous movements during the day. Patient failed trial of CPAP earlier today, currently off sedation or pressor support.     ====================  NEW EVENTS:  ====================    Patient remains heavily sedated with minimal response to noxious stimuli. He is overbreathing the vent and maintaining good bp without support.     ====================  VITALS (Last 12 hrs):  ====================    T(C): 36.9 (09-22-17 @ 22:00), Max: 38 (09-22-17 @ 16:30)  T(F): 98.5 (09-22-17 @ 22:00), Max: 100.4 (09-22-17 @ 16:30)  HR: 108 (09-22-17 @ 22:30) (70 - 108)  BP: --  BP(mean): --  ABP: 124/56 (09-22-17 @ 22:30) (104/46 - 148/60)  ABP(mean): 76 (09-22-17 @ 22:30) (62 - 92)  RR: 23 (09-22-17 @ 22:30) (12 - 23)  SpO2: 98% (09-22-17 @ 22:30) (93% - 100%)  Wt(kg): --  CVP(mm Hg): 2 (09-22-17 @ 22:30) (2 - 4)    I&O's Summary    21 Sep 2017 07:01  -  22 Sep 2017 07:00  --------------------------------------------------------  IN: 2335.1 mL / OUT: 4170 mL / NET: -1834.9 mL    22 Sep 2017 07:01  -  22 Sep 2017 22:54  --------------------------------------------------------  IN: 1594.5 mL / OUT: 15 mL / NET: 1579.5 mL        Mode: AC/ CMV (Assist Control/ Continuous Mandatory Ventilation)  RR (machine): 14  TV (machine): 500  FiO2: 40  PEEP: 5  ITime: 1  MAP: 9  PIP: 19      ====================  NEW LABS:  ====================                          8.4    24.4  )-----------( 151      ( 22 Sep 2017 21:57 )             25.3     09-22    134<L>  |  96  |  49<H>  ----------------------------<  110<H>  4.3   |  20<L>  |  5.70<H>    Ca    8.5      22 Sep 2017 21:57  Phos  5.9     09-22  Mg     2.0     09-22    TPro  6.4  /  Alb  3.2<L>  /  TBili  0.6  /  DBili  x   /  AST  379<H>  /  ALT  112<H>  /  AlkPhos  79  09-22    PT/INR - ( 22 Sep 2017 04:17 )   PT: 15.9 sec;   INR: 1.46 ratio         PTT - ( 22 Sep 2017 04:17 )  PTT:94.7 sec  Creatine Kinase, Serum: 865 U/L <H> (09-22-17 @ 21:57)  Troponin T, Serum: 8.14 ng/mL <H> (09-22-17 @ 21:57)  Creatine Kinase, Serum: 928 U/L <H> (09-22-17 @ 12:02)  Troponin T, Serum: 6.30 ng/mL <H> (09-22-17 @ 12:02)  Creatine Kinase, Serum: 664 U/L <H> (09-22-17 @ 04:17)  Troponin T, Serum: 3.24 ng/mL <H> (09-22-17 @ 04:17)  Creatine Kinase, Serum: 160 U/L (09-22-17 @ 00:31)  Troponin T, Serum: 1.98 ng/mL <H> (09-22-17 @ 00:31)  Creatine Kinase, Serum: 54 U/L (09-21-17 @ 23:04)  Troponin T, Serum: 1.96 ng/mL <H> (09-21-17 @ 23:04)    CKMB Units: 26.8 ng/mL (09-22 @ 21:57)  CKMB Units: 53.3 ng/mL (09-22 @ 12:02)  CKMB Units: 31.8 ng/mL (09-22 @ 04:17)  CKMB Units: 4.9 ng/mL (09-22 @ 00:31)  CKMB Units: 2.6 ng/mL (09-21 @ 23:04)    ABG - ( 22 Sep 2017 14:25 )  pH: 7.39  /  pCO2: 38    /  pO2: 139   / HCO3: 23    / Base Excess: -1.6  /  SaO2: 99                    ====================  PLAN:  ====================  Neuro:       MS not significantly improved from this morning despite being off sedation. Will maintain off sedation, neuro checks q1h.        Plan for MRI brain after more stable vs repeat CT head in am if no improvement in neuro status    Cards:     Maintain on amio gtt at 0.5 tonight - 24 hr load completed at midnight tonight; however pt npo and cannot give po maintenance. would switch to IV amio 150 bolus daily if prolonged intubation.  Monitor LFTs       Cw asa, plavix, lipitor 40.       Repeat TTE concerning for significantly decreased LV function compared to last week. Troponins uptrending.  New MI entirely possible in the setting of hypotension, poor perfusion especially given the new hydralazine dose yesterday prior to code which could have created coronary steal. At this time pt not stable for LHC, but will require a repeat PCI after more stable.       CVP 6 - goal 10-14. Pt s/p 1.5u pRBC w adequate Hb response, but may benefit from additional 0.5 u to improve oxygenation as well as preload. Will discuss w fellow.         Pulm:      Minimal vent settings, overbreathing the vent, will try CPAP in the am.     GI:     Npo for now     Renal:      No urine output, worsening renal function despite lack of obstructive process as per last renal US. Plan for HD tomorrow. Maybe possible cause of currently sedative state.     Heme:    Leukocytosis - cw zosyn and vanc for now. F/u cultures.

## 2017-09-22 NOTE — DIETITIAN INITIAL EVALUATION ADULT. - NS AS NUTRI INTERV MEALS SNACK
RD remains available for recommendation. Consider Consistent Carbohydrate Renal diet in consistency tolerated by pt when medically feasible

## 2017-09-22 NOTE — CONSULT NOTE ADULT - SUBJECTIVE AND OBJECTIVE BOX
NEUROLOGY CONSULT       HPI:  This is 73 yo male who is Turkish speaking male with PMH CAD s/p 12-14  stents placed [id number], T2 DM. 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 Patient was admitted  with pyelonephritis started on ceftriaxone. 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 Patient is now s/p james making no urine, 2nd dialysis today via femoral catheter for creatine of . Patient treated in the ICU for  NSTEMI with TWI precordial and lateral leads  with increased troponin I  0.120>0.193 . Patient was also started on Heparin gtt, loaded with ASa and plavix, TTE with EF 35 %. He was intubated with hypoxic respiratory distress from pulm edema from the acute NSTEMI as well as treated for  Hypertensive emergency with NTG gtt.  He is transfered to [facility] for cardiac angiogram. S/p 4-5 stents.    Code Blue called 2-3 hours prior for hypotension and altered mental status.   Patient was unresponsive. No reaction to painful stimuli.  Non reactive pupils. Faint lung and heart sounds.  Patient was hypotensive to 68 systolic.   Patient had Dialysis earlier and 2L were removed.  Patient received dose of hydralazine today as well. Patient was started on 1L NS on pressor bag.  Blood work was repeated included cardiac enzymes and ABG. Patient's EKG was obtained.  Initially patient's HR was in the 70s but dropped to 20.  Patient lost pulse.   Cardiac compressions were initiated. Patient was given a total of 5 rounds of Epinephrine.  300 mg of Amiodarone during the third pulse check when he was in VT.  Patient went back to PEA and compressions were continued for the second two rounds.  Patient also received amp of bicarb and calcium.  Patient was intubated by anesthesia.  After the fifth pulse check, the code was stopped, no pulse, however couple minutes later patient's pulse came back.  Patient's initial BP was 120s systolic, Levophed was initiated. Patient was in VT and Amiodarone was started.  Patient was transferred to CCU for further care.     Code stroke now called for decreased movement on L side. Exam inconsistent, limited due to cooperation. Last known normal unclear. During code blue, pt was not moving extremities x4, no exam noted. Per family it was at some point prior to intubation that they are able to verify that he was normal. Pt taken for stat CT Head which shows no acute changes.   NIHSS 14 , MRS 2 prior to discharge.       PAST MEDICAL & SURGICAL HISTORY:  CVA (cerebral vascular accident)  COPD (chronic obstructive pulmonary disease)  BPH (benign prostatic hyperplasia)  Bipolar affective disorder  CKD (chronic kidney disease)  Pancreatitis  Hypertension  Dyslipidemia  Diabetes mellitus  Coronary artery disease  History of cardiac catheterization      Allergies    No Known Allergies    Intolerances      MEDICATIONS  (STANDING):  insulin lispro (HumaLOG) corrective regimen sliding scale   SubCutaneous three times a day before meals  insulin lispro (HumaLOG) corrective regimen sliding scale   SubCutaneous at bedtime  dextrose 5%. 1000 milliLiter(s) (50 mL/Hr) IV Continuous <Continuous>  dextrose 50% Injectable 12.5 Gram(s) IV Push once  dextrose 50% Injectable 25 Gram(s) IV Push once  dextrose 50% Injectable 25 Gram(s) IV Push once  aspirin enteric coated 81 milliGRAM(s) Oral daily  tamsulosin 0.4 milliGRAM(s) Oral at bedtime  isosorbide   mononitrate ER Tablet (IMDUR) 30 milliGRAM(s) Oral daily  ranolazine 1000 milliGRAM(s) Oral two times a day  clopidogrel Tablet 75 milliGRAM(s) Oral daily  cefTRIAXone   IVPB 1 Gram(s) IV Intermittent every 24 hours  buDESOnide  80 MICROgram(s)/formoterol 4.5 MICROgram(s) Inhaler 2 Puff(s) Inhalation two times a day  busPIRone 5 milliGRAM(s) Oral two times a day  cefTRIAXone   IVPB      atorvastatin 80 milliGRAM(s) Oral at bedtime  metoprolol succinate  milliGRAM(s) Oral daily  hydrALAZINE 10 milliGRAM(s) Oral three times a day  sevelamer hydrochloride 1600 milliGRAM(s) Oral three times a day with meals  heparin  Injectable 5000 Unit(s) SubCutaneous every 8 hours  propofol Infusion 10 MICROgram(s)/kG/Min (4.896 mL/Hr) IV Continuous <Continuous>  pantoprazole  Injectable 40 milliGRAM(s) IV Push once  norepinephrine Infusion 0.05 MICROgram(s)/kG/Min (7.65 mL/Hr) IV Continuous <Continuous>  fentaNYL   Infusion 1 MICROgram(s)/kG/Hr (4.08 mL/Hr) IV Continuous <Continuous>  amiodarone Infusion 1 mG/Min (33.333 mL/Hr) IV Continuous <Continuous>  amiodarone Infusion 0.5 mG/Min (16.667 mL/Hr) IV Continuous <Continuous>    MEDICATIONS  (PRN):  dextrose Gel 1 Dose(s) Oral once PRN Blood Glucose LESS THAN 70 milliGRAM(s)/deciliter  glucagon  Injectable 1 milliGRAM(s) IntraMuscular once PRN Glucose LESS THAN 70 milligrams/deciliter  ALBUTerol    90 MICROgram(s) HFA Inhaler 2 Puff(s) Inhalation every 6 hours PRN Shortness of Breath  acetaminophen   Tablet. 650 milliGRAM(s) Oral every 6 hours PRN pain      SOCIAL HISTORY: Unknown      FAMILY HISTORY:  Unknown      REVIEW OF SYSTEMS:  CONSTITUTIONAL:  No weight loss, fever, chills, weakness or fatigue.  HEENT:  Eyes:  No visual loss, blurred vision, double vision or yellow sclerae. Ears, Nose, Throat:  No hearing loss, sneezing, congestion, runny nose or sore throat.  SKIN:  No rash or itching.  CARDIOVASCULAR:  No chest pain, chest pressure or chest discomfort. No palpitations or edema.  RESPIRATORY:  No shortness of breath, cough or sputum.  GASTROINTESTINAL:  No anorexia, nausea, vomiting or diarrhea. No abdominal pain or blood.  GENITOURINARY:  denies incontinence/retention   NEUROLOGICAL:  see HPI   MUSCULOSKELETAL:  No muscle, back pain, joint pain or stiffness.  HEMATOLOGIC:  No anemia, bleeding or bruising.  LYMPHATICS:  No enlarged nodes. No history of splenectomy.  PSYCHIATRIC:  No history of depression or anxiety.  ENDOCRINOLOGIC:  No reports of sweating, cold or heat intolerance. No polyuria or polydipsia.      Vital Signs Last 24 Hrs  T(C): 36.5 (21 Sep 2017 23:45), Max: 37.3 (21 Sep 2017 18:20)  T(F): 97.7 (21 Sep 2017 23:45), Max: 99.2 (21 Sep 2017 18:20)  HR: 84 (22 Sep 2017 01:35) (79 - 122)  BP: 113/64 (22 Sep 2017 01:35) (110/64 - 168/87)  BP(mean): 78 (22 Sep 2017 01:35) (74 - 117)  RR: 17 (22 Sep 2017 01:35) (17 - 32)  SpO2: 100% (22 Sep 2017 01:35) (96% - 100%)        PHYSICAL EXAM:   General appearance: Intubated. No sedation. Mild acute distress, agitated /combative               Mental Status: Intubated, does not follow commands, eyes closed most of the time however opened them spontaneously at times and attentive to people in room, seems behavioral   Cranial Nerves: EOMI, no obvious visual neglect, PERRL, face symmetric  Motor: 3/5 RUE, at least 4/5 LUE, 3/5 b/l LE  Sensation: no withdrawal to pain x4 extremities.   Coordination: unable to assess      LABS:                          8.4    28.4  )-----------( 131      ( 22 Sep 2017 00:31 )             25.1     09-22    136  |  95<L>  |  29<H>  ----------------------------<  359<H>  4.0   |  18<L>  |  3.28<H>    Ca    9.3      22 Sep 2017 00:31  Phos  6.1     09-22  Mg     2.0     09-22    TPro  7.4  /  Alb  3.3  /  TBili  0.5  /  DBili  x   /  AST  25  /  ALT  20  /  AlkPhos  86  09-21      IMAGING:    CT Head:   No acute intracranial hemorrhage, mass effect, or CT evidence of a large  acute or transcortical infarct. Chronic-appearing bilateral basal ganglia  and right occipital infarcts and mild chronic microvascular change.

## 2017-09-22 NOTE — PROCEDURE NOTE - NSPOSTPRCRAD_GEN_A_CORE
central line located in the superior vena cava/post procedure radiography not performed/no pneumothorax
central line located in the superior vena cava/no pneumothorax

## 2017-09-22 NOTE — DISCHARGE NOTE ADULT - NS AS DC STROKE ED MATERIALS
Need for Followup After Discharge/Stroke Warning Signs and Symptoms/Call 911 for Stroke/Stroke Education Booklet/Risk Factors for Stroke/Prescribed Medications

## 2017-09-22 NOTE — DISCHARGE NOTE ADULT - PATIENT PORTAL LINK FT
“You can access the [facility] Patient Portal, offered by [facility], by registering with the following website: [email or web address]”

## 2017-09-22 NOTE — STROKE CODE NOTE - NIH STROKE SCALE: 11. EXTINCTION AND INATTENTION, QM
(0) No abnormality
(1) Visual, tactile, auditory, spatial, or personal inattention or extinction to bilateral simultaneous stimulation in one of the sensory modalities

## 2017-09-22 NOTE — DISCHARGE NOTE ADULT - PLAN OF CARE
Rehabilitation You were brought to [place] for a cardiac event you had at an outside hospital that required 5 stents to be placed. You have been followed closely by cardiology while admitted to the hospital. Because you have these stents, please continue to take your daily PLAVIX and ASPIRIN to prevent clots at the stents. Because of the blockage in you kidney by the stone you had, you developed end stage renal disease. This is also because your left kidney is non functioning. You have required dialysis while you were admitted as you were unable to make sufficient urine. You have received a permacath while admitted so that you can continue your dialysis after you are discharged. Please go to all of your dialysis appointments. If you miss appointments, you can develop a serious arrhythmia due to electrolyte imbalance or severe shortness of breath from too much fluid. You have high blood pressure (also called hypertension). This means the force of blood against your artery walls is too strong. It also means your heart is working hard to move blood. Take your blood pressure medicine exactly as directed. Don't skip doses. Missing doses can cause your blood pressure to get out of control. You were diagnosed with diabetes prior to admission to the hospital. We have been controlling your blood sugars with insulin to ensure we have close control of the levels. Upon discharge, please take your home diabetes medications as you have before admission. You developed an arrhythmia in your heart called atrial fibrillation and atrial flutter. We have ensured that your heart rate was controlled well while you were admitted. We have also started you on an anticoagulant called warfarin (or coumadin) to prevent clots than can cause a stroke in the long run. This medication requires close monitoring of a lab value, INR for dosing. Please go regularly to get your INR checked so that the warfarin remains within a particular level. You came in with prior issues with your heart. While you were here, you have a cardiac arrest. This cardiac arrest damaged your heart further, so you now have severe dysfunction. Please follow closely with a cardiologist to monitor your heart function. Please take your medications to ensure your heart failure does not worsen or cause other problems. While in the hospital, you experienced a cerebral vascular accident with neurological defects likely related to your arrythmia in your heart. Please continue taking coumadin as directed to thin your blood and prevent future emboli from causing infarct in the brain. Management

## 2017-09-22 NOTE — DISCHARGE NOTE ADULT - MEDICATION SUMMARY - MEDICATIONS TO TAKE
I will START or STAY ON the medications listed below when I get home from the hospital:    Aspirin Enteric Coated 81 mg oral delayed release tablet  -- 1 tab(s) by mouth once a day, hosp  -- Indication: For Coronary artery disease    tamsulosin 0.4 mg oral capsule  -- 1 cap(s) by mouth once a day, home  -- Indication: For BPH (benign prostatic hyperplasia)    Flomax 0.4 mg oral capsule  -- 1 cap(s) by mouth once a day  -- Indication: For BPH (benign prostatic hyperplasia)    amiodarone 200 mg oral tablet  -- 1 tab(s) by mouth once a day  -- Indication: For Atrial fibrillation and flutter    Coumadin 3 mg oral tablet  -- 1 tab(s) by mouth once a day  -- Indication: For Atrial fibrillation and flutter    Januvia 50 mg oral tablet  -- 1 tab(s) by mouth once a day  -- Indication: For Diabetes mellitus    atorvastatin 40 mg oral tablet  -- 1 tab(s) by mouth once a day (at bedtime)  -- Indication: For Dyslipidemia    clopidogrel 75 mg oral tablet  -- 1 tab(s) by mouth once a day  -- Indication: For Coronary artery disease    busPIRone 5 mg oral tablet  -- 1 tab(s) by mouth 2 times a day  -- Indication: For Coronary artery disease    metoprolol tartrate 25 mg oral tablet  -- 1 tab(s) by mouth 2 times a day  -- Indication: For Coronary artery disease    ProAir HFA 90 mcg/inh inhalation aerosol  -- 2 puff(s) inhaled 2 times a day, As Needed, home  -- Indication: For Chronic obstructive pulmonary disease, unspecified COPD type    Breo Ellipta 100 mcg-25 mcg/inh inhalation powder  -- 1 puff(s) inhaled once a day, home  -- Indication: For Chronic obstructive pulmonary disease, unspecified COPD type    sevelamer hydrochloride 800 mg oral tablet  -- 2 tab(s) by mouth 3 times a day (with meals)  -- Indication: For ESRD (end stage renal disease)    pantoprazole 40 mg intravenous injection  -- 40 milligram(s) intravenous once a day  -- Indication: For Need for prophylactic measure    Vitamin D3 50,000 intl units oral capsule  -- 1 cap(s) by mouth once a month  -- Indication: For ESRD (end stage renal disease)

## 2017-09-22 NOTE — PROGRESS NOTE ADULT - ASSESSMENT
71 yo M w extensive CAD, T2DM, ESRD new HD, prior CVA who was transferred from OSH for NSTEMI s/p impella assisted LHC 1wk ago transferred from floor to CCU after cardiac arrest, vtach and subsequent ROSC. 71 yo M w extensive CAD, T2DM, ESRD new HD, prior CVA who was transferred from OSH for NSTEMI s/p impella assisted C 1wk ago being treated for pyelonephritis with course c/b obstructive nephropathy requiring HD transferred from floor to CCU after cardiac arrest, vtach and subsequent ROSC.    #Neuro       Intubated and sedated.       - Possible hypoperfusion/ischemia in brain              No spontaneous RUE/RLE movement earlier in the night when patient awake.              S/p Code stroke. CT head negative for acute infarct/hemorrhage. Pt not a tPA candidate.              Pt heavily sedated this morning making assessment of neurological function difficult. Will require repeat CT head or MRI if stable later today.     #CVS     - S/p Cardiac arrest/vtach                Unclear etiology. Patient was sinus anthony -- asystole -- vtach -- NSR.  Has been NSR in CCU.                C/w amio gtt. Arrythmia likely triggered by electrolyte abnormalities in the setting of pt refusing HD for few days.                Given the recent NSTEMI, complicated cath, ischemia/cath complication incl. instent thrombosis a possibility. Cardiac enzymes minimally elevated, down from before. Will repeat TTE to assess cardiac function.     - c/w aspirin, plavix, lipitor.  Hold BB/Ace in the setting of hypotension.     #Pulm:      - Acute hypoxic respiratory failure in the setting of cardiac arrest              Decreasing vent requirement overnight, FiO2 down to 60% now with O2 sat 100%.  Will try CPAP today and plan to extubate if successful.        #GI:        NG tube in place, pt NPO except for medication.     #Renal:      - Pyelo with LIN progressing to ESRD requiring HD:  on Ceftriaxone day 11. Acute leukocytosis more likely 2/2 cardiac arrest related systemic inflammatory response.                   No hydro on recent US. Pt not producing urine w acute kidney injury. Atrophic kidneys - possibly ATN vs intrinsic kidney disease. Requires further renal evaluation for the etiology of his renal failure. Will discuss w nephro.                 Would escalate abx to zosyn for now.     #ID:      Leukocytosis, likely systemic response. Will monitor for now.     #Endo        T2DM 71 yo M w extensive CAD, T2DM, ESRD new HD, prior CVA who was transferred from OSH for NSTEMI s/p impella assisted C 1wk ago being treated for pyelonephritis with course c/b obstructive nephropathy requiring HD transferred from floor to CCU after cardiac arrest, vtach and subsequent ROSC.    #Neuro       Intubated and sedated.       - Possible hypoperfusion/ischemia in brain              No spontaneous RUE/RLE movement earlier in the night when patient awake.              S/p Code stroke. CT head negative for acute infarct/hemorrhage. Pt not a tPA candidate.              Pt heavily sedated this morning making assessment of neurological function difficult. Will require repeat CT head or MRI if stable later today.     #CVS     - S/p Cardiac arrest/vtach                Unclear etiology. Patient was sinus anthony -- asystole -- vtach -- NSR.  Has been NSR in CCU.                C/w amio gtt. Arrythmia likely triggered by electrolyte abnormalities in the setting of pt refusing HD for few days.                Given the recent NSTEMI, complicated cath, ischemia/cath complication incl. instent thrombosis a possibility. Cardiac enzymes minimally elevated, down from before. Will repeat TTE to assess cardiac function.     - c/w aspirin, plavix, lipitor.  Hold BB/Ace in the setting of hypotension.     - CVP 6, IVC collapsable. Will give 250 cc bolus.     #Pulm:      - Acute hypoxic respiratory failure in the setting of cardiac arrest              Decreasing vent requirement overnight, FiO2 down to 60% now with O2 sat 100%.  Will try CPAP today and plan to extubate if successful.        #GI:        NG tube in place, pt NPO except for medication.     #Renal:      - Pyelo with LIN progressing to ESRD requiring HD:  on Ceftriaxone day 11. Acute leukocytosis more likely 2/2 cardiac arrest related systemic inflammatory response.                   No hydro on recent US. Pt not producing urine w acute kidney injury. Atrophic kidneys - possibly ATN vs intrinsic kidney disease. Requires further renal evaluation for the etiology of his renal failure. Will discuss w nephro.                 Would escalate abx to zosyn for now.     #ID:      Leukocytosis, likely systemic response. Will monitor for now.     #Endo        T2DM A1C 6.5   Blood glucose >380 ON, AG 23. On insulin gtt, continue till AG closes. pH nl, unlikely to be DKA.     #Heme     DVT ppx: hep sq 73 yo M w extensive CAD, T2DM, ESRD new HD, prior CVA who was transferred from OSH for NSTEMI s/p impella assisted LHC 1wk ago being treated for pyelonephritis with course c/b obstructive nephropathy requiring HD transferred from floor to CCU after cardiac arrest, vtach and subsequent ROSC.    #Neuro       Intubated and sedated.       - Possible hypoperfusion/ischemia in brain              No spontaneous RUE/RLE movement earlier in the night when patient awake.              S/p Code stroke. CT head negative for acute infarct/hemorrhage. Pt not a tPA candidate.              Pt heavily sedated this morning making assessment of neurological function difficult. Will require repeat CT head or MRI if stable later today. Fu Neuro recs     #CVS     - S/p Cardiac arrest/vtach                Unclear etiology. Patient was sinus anthoyn -- asystole -- vtach -- NSR.  Has been NSR in CCU.                C/w amio gtt. Arrythmia likely triggered by electrolyte abnormalities in the setting of pt refusing HD for few days.                Given the recent NSTEMI, complicated cath, ischemia/cath complication incl. instent thrombosis a possibility. Cardiac enzymes minimally elevated, down from before. Will repeat TTE to assess cardiac function.     - c/w aspirin, plavix, lipitor.  Hold BB/Ace in the setting of hypotension.     - CVP 6, IVC collapsable. Will give 250 cc bolus.     #Pulm:      - Acute hypoxic respiratory failure in the setting of cardiac arrest              Decreasing vent requirement overnight, FiO2 down to 60% now with O2 sat 100%.  Will try CPAP today and plan to extubate if successful.        #GI:        NG tube in place, pt NPO except for medication.     #Renal:      - Pyelo with LIN progressing to ESRD requiring HD:  on Ceftriaxone day 11. Acute leukocytosis more likely 2/2 cardiac arrest related systemic inflammatory response.                   No hydro on recent US. Pt not producing urine w acute kidney injury. Atrophic kidneys - possibly ATN vs intrinsic kidney disease. Requires further renal evaluation for the etiology of his renal failure. Will discuss w nephro.                 Would escalate abx to zosyn for now.     #ID:      Leukocytosis, likely systemic response. Will monitor for now.     #Endo        T2DM A1C 6.5   Blood glucose >380 ON, AG 23. On insulin gtt, continue till AG closes. pH nl, unlikely to be DKA.     #Heme     DVT ppx: hep sq

## 2017-09-22 NOTE — DISCHARGE NOTE ADULT - MEDICATION SUMMARY - MEDICATIONS TO STOP TAKING
I will STOP taking the medications listed below when I get home from the hospital:    BuSpar 5 mg oral tablet  -- 3 tab(s) by mouth 2 times a day, home    simvastatin 80 mg oral tablet  -- 1 tab(s) by mouth once a day (at bedtime) home & hosp    Metoprolol Succinate  mg oral tablet, extended release  -- 1 tab(s) by mouth once a day, home    NIFEdipine 60 mg oral tablet, extended release  -- 1 tab(s) by mouth once a day, home & hosp    Edarbi 80 mg oral tablet  -- 1 tab(s) by mouth once a day, home    nitroglycerin 0.6 mg/hr transdermal film, extended release  -- 1 patch by transdermal patch once a day 12 hours on for 12 hours, , home    Janumet 50 mg-1000 mg oral tablet  -- 1 tab(s) by mouth 2 times a day, home    glimepiride 4 mg oral tablet  -- 1 tab(s) by mouth once a day, home    Ranexa 1000 mg oral tablet, extended release  -- 1 tab(s) by mouth 2 times a day, home    cefTRIAXone 1 g injection  -- 1 gram(s) injectable every 24 hours,hosp started on 9/6    Imdur 30 mg oral tablet, extended release  -- 1 tab(s) by mouth once a day (in the morning),hosp    heparin 5000 units/mL injectable solution  -- injectable every 12 hours,hosp

## 2017-09-23 DIAGNOSIS — I63.49 CEREBRAL INFARCTION DUE TO EMBOLISM OF OTHER CEREBRAL ARTERY: ICD-10-CM

## 2017-09-23 LAB
ALBUMIN SERPL ELPH-MCNC: 2.9 G/DL — LOW (ref 3.3–5)
ALP SERPL-CCNC: 77 U/L — SIGNIFICANT CHANGE UP (ref 40–120)
ALT FLD-CCNC: 95 U/L RC — HIGH (ref 10–45)
ANION GAP SERPL CALC-SCNC: 21 MMOL/L — HIGH (ref 5–17)
APTT BLD: 37.9 SEC — HIGH (ref 27.5–37.4)
AST SERPL-CCNC: 336 U/L — HIGH (ref 10–40)
BASOPHILS # BLD AUTO: 0.1 K/UL — SIGNIFICANT CHANGE UP (ref 0–0.2)
BASOPHILS NFR BLD AUTO: 0.3 % — SIGNIFICANT CHANGE UP (ref 0–2)
BILIRUB SERPL-MCNC: 0.5 MG/DL — SIGNIFICANT CHANGE UP (ref 0.2–1.2)
BUN SERPL-MCNC: 56 MG/DL — HIGH (ref 7–23)
CALCIUM SERPL-MCNC: 8.4 MG/DL — SIGNIFICANT CHANGE UP (ref 8.4–10.5)
CHLORIDE SERPL-SCNC: 94 MMOL/L — LOW (ref 96–108)
CK MB BLD-MCNC: 2.4 % — SIGNIFICANT CHANGE UP (ref 0–3.5)
CK MB CFR SERPL CALC: 17.7 NG/ML — HIGH (ref 0–6.7)
CK SERPL-CCNC: 729 U/L — HIGH (ref 30–200)
CO2 SERPL-SCNC: 19 MMOL/L — LOW (ref 22–31)
CREAT SERPL-MCNC: 6.13 MG/DL — HIGH (ref 0.5–1.3)
EOSINOPHIL # BLD AUTO: 0 K/UL — SIGNIFICANT CHANGE UP (ref 0–0.5)
EOSINOPHIL NFR BLD AUTO: 0.2 % — SIGNIFICANT CHANGE UP (ref 0–6)
GAS PNL BLDA: SIGNIFICANT CHANGE UP
GLUCOSE SERPL-MCNC: 141 MG/DL — HIGH (ref 70–99)
HCT VFR BLD CALC: 23.5 % — LOW (ref 39–50)
HGB BLD-MCNC: 7.9 G/DL — LOW (ref 13–17)
LYMPHOCYTES # BLD AUTO: 1.8 K/UL — SIGNIFICANT CHANGE UP (ref 1–3.3)
LYMPHOCYTES # BLD AUTO: 7.8 % — LOW (ref 13–44)
MAGNESIUM SERPL-MCNC: 2 MG/DL — SIGNIFICANT CHANGE UP (ref 1.6–2.6)
MCHC RBC-ENTMCNC: 29.5 PG — SIGNIFICANT CHANGE UP (ref 27–34)
MCHC RBC-ENTMCNC: 33.7 GM/DL — SIGNIFICANT CHANGE UP (ref 32–36)
MCV RBC AUTO: 87.4 FL — SIGNIFICANT CHANGE UP (ref 80–100)
MONOCYTES # BLD AUTO: 1.6 K/UL — HIGH (ref 0–0.9)
MONOCYTES NFR BLD AUTO: 7 % — SIGNIFICANT CHANGE UP (ref 2–14)
NEUTROPHILS # BLD AUTO: 19.3 K/UL — HIGH (ref 1.8–7.4)
NEUTROPHILS NFR BLD AUTO: 84.7 % — HIGH (ref 43–77)
PHOSPHATE SERPL-MCNC: 5.8 MG/DL — HIGH (ref 2.5–4.5)
PLATELET # BLD AUTO: 168 K/UL — SIGNIFICANT CHANGE UP (ref 150–400)
POTASSIUM SERPL-MCNC: 4.3 MMOL/L — SIGNIFICANT CHANGE UP (ref 3.5–5.3)
POTASSIUM SERPL-SCNC: 4.3 MMOL/L — SIGNIFICANT CHANGE UP (ref 3.5–5.3)
PROT SERPL-MCNC: 6.4 G/DL — SIGNIFICANT CHANGE UP (ref 6–8.3)
RBC # BLD: 2.69 M/UL — LOW (ref 4.2–5.8)
RBC # FLD: 12.5 % — SIGNIFICANT CHANGE UP (ref 10.3–14.5)
SODIUM SERPL-SCNC: 134 MMOL/L — LOW (ref 135–145)
TROPONIN T SERPL-MCNC: 7.64 NG/ML — HIGH (ref 0–0.06)
VANCOMYCIN TROUGH SERPL-MCNC: 12.7 UG/ML — SIGNIFICANT CHANGE UP (ref 10–20)
WBC # BLD: 22.8 K/UL — HIGH (ref 3.8–10.5)
WBC # FLD AUTO: 22.8 K/UL — HIGH (ref 3.8–10.5)

## 2017-09-23 PROCEDURE — 70450 CT HEAD/BRAIN W/O DYE: CPT | Mod: 26

## 2017-09-23 PROCEDURE — 71010: CPT | Mod: 26

## 2017-09-23 PROCEDURE — 99233 SBSQ HOSP IP/OBS HIGH 50: CPT

## 2017-09-23 PROCEDURE — 99291 CRITICAL CARE FIRST HOUR: CPT

## 2017-09-23 PROCEDURE — 93010 ELECTROCARDIOGRAM REPORT: CPT

## 2017-09-23 PROCEDURE — 74176 CT ABD & PELVIS W/O CONTRAST: CPT | Mod: 26

## 2017-09-23 RX ORDER — MEROPENEM 1 G/30ML
500 INJECTION INTRAVENOUS EVERY 24 HOURS
Qty: 0 | Refills: 0 | Status: DISCONTINUED | OUTPATIENT
Start: 2017-09-23 | End: 2017-09-26

## 2017-09-23 RX ORDER — AMIODARONE HYDROCHLORIDE 400 MG/1
0.5 TABLET ORAL
Qty: 900 | Refills: 0 | Status: DISCONTINUED | OUTPATIENT
Start: 2017-09-23 | End: 2017-09-23

## 2017-09-23 RX ORDER — ACETAMINOPHEN 500 MG
650 TABLET ORAL ONCE
Qty: 0 | Refills: 0 | Status: COMPLETED | OUTPATIENT
Start: 2017-09-23 | End: 2017-09-23

## 2017-09-23 RX ORDER — FENTANYL CITRATE 50 UG/ML
25 INJECTION INTRAVENOUS ONCE
Qty: 0 | Refills: 0 | Status: DISCONTINUED | OUTPATIENT
Start: 2017-09-23 | End: 2017-09-23

## 2017-09-23 RX ORDER — VANCOMYCIN HCL 1 G
1000 VIAL (EA) INTRAVENOUS ONCE
Qty: 0 | Refills: 0 | Status: COMPLETED | OUTPATIENT
Start: 2017-09-23 | End: 2017-09-23

## 2017-09-23 RX ORDER — HEPARIN SODIUM 5000 [USP'U]/ML
5000 INJECTION INTRAVENOUS; SUBCUTANEOUS EVERY 8 HOURS
Qty: 0 | Refills: 0 | Status: DISCONTINUED | OUTPATIENT
Start: 2017-09-23 | End: 2017-09-24

## 2017-09-23 RX ORDER — SODIUM CHLORIDE 9 MG/ML
250 INJECTION INTRAMUSCULAR; INTRAVENOUS; SUBCUTANEOUS ONCE
Qty: 0 | Refills: 0 | Status: COMPLETED | OUTPATIENT
Start: 2017-09-23 | End: 2017-09-23

## 2017-09-23 RX ORDER — ACETAMINOPHEN 500 MG
650 TABLET ORAL ONCE
Qty: 0 | Refills: 0 | Status: DISCONTINUED | OUTPATIENT
Start: 2017-09-23 | End: 2017-09-23

## 2017-09-23 RX ORDER — AMIODARONE HYDROCHLORIDE 400 MG/1
0.5 TABLET ORAL
Qty: 900 | Refills: 0 | Status: DISCONTINUED | OUTPATIENT
Start: 2017-09-23 | End: 2017-09-24

## 2017-09-23 RX ORDER — CHLORHEXIDINE GLUCONATE 213 G/1000ML
15 SOLUTION TOPICAL
Qty: 0 | Refills: 0 | Status: DISCONTINUED | OUTPATIENT
Start: 2017-09-23 | End: 2017-09-25

## 2017-09-23 RX ADMIN — CHLORHEXIDINE GLUCONATE 15 MILLILITER(S): 213 SOLUTION TOPICAL at 18:41

## 2017-09-23 RX ADMIN — ATORVASTATIN CALCIUM 40 MILLIGRAM(S): 80 TABLET, FILM COATED ORAL at 22:06

## 2017-09-23 RX ADMIN — Medication 81 MILLIGRAM(S): at 12:11

## 2017-09-23 RX ADMIN — Medication 5 MILLIGRAM(S): at 22:06

## 2017-09-23 RX ADMIN — Medication 250 MILLIGRAM(S): at 22:06

## 2017-09-23 RX ADMIN — Medication 5 MILLIGRAM(S): at 05:05

## 2017-09-23 RX ADMIN — TAMSULOSIN HYDROCHLORIDE 0.4 MILLIGRAM(S): 0.4 CAPSULE ORAL at 22:06

## 2017-09-23 RX ADMIN — HEPARIN SODIUM 5000 UNIT(S): 5000 INJECTION INTRAVENOUS; SUBCUTANEOUS at 22:06

## 2017-09-23 RX ADMIN — FENTANYL CITRATE 25 MICROGRAM(S): 50 INJECTION INTRAVENOUS at 20:30

## 2017-09-23 RX ADMIN — PANTOPRAZOLE SODIUM 40 MILLIGRAM(S): 20 TABLET, DELAYED RELEASE ORAL at 12:11

## 2017-09-23 RX ADMIN — AMIODARONE HYDROCHLORIDE 16.67 MG/MIN: 400 TABLET ORAL at 18:46

## 2017-09-23 RX ADMIN — CLOPIDOGREL BISULFATE 75 MILLIGRAM(S): 75 TABLET, FILM COATED ORAL at 12:11

## 2017-09-23 RX ADMIN — SODIUM CHLORIDE 500 MILLILITER(S): 9 INJECTION INTRAMUSCULAR; INTRAVENOUS; SUBCUTANEOUS at 00:14

## 2017-09-23 RX ADMIN — Medication 650 MILLIGRAM(S): at 08:46

## 2017-09-23 RX ADMIN — Medication 1: at 12:20

## 2017-09-23 RX ADMIN — FENTANYL CITRATE 25 MICROGRAM(S): 50 INJECTION INTRAVENOUS at 20:09

## 2017-09-23 RX ADMIN — MEROPENEM 200 MILLIGRAM(S): 1 INJECTION INTRAVENOUS at 20:09

## 2017-09-23 RX ADMIN — AMIODARONE HYDROCHLORIDE 16.67 MG/MIN: 400 TABLET ORAL at 19:15

## 2017-09-23 RX ADMIN — PIPERACILLIN AND TAZOBACTAM 25 GRAM(S): 4; .5 INJECTION, POWDER, LYOPHILIZED, FOR SOLUTION INTRAVENOUS at 05:05

## 2017-09-23 NOTE — PROGRESS NOTE ADULT - SUBJECTIVE AND OBJECTIVE BOX
CHIEF COMPLAINT: Cardiac arrest.    Interval Events: Afib 's ON, HDS. Amiodarone continued, resolved. H/H continuing to drop s/p 1.5 u pRBC, CTH AP performed pending read. Fentanyl undialyzable as per conversation with renal, plan for HD today.    REVIEW OF SYSTEMS:  Constitutional:   Eyes:  ENT:  CV:  Resp:  GI:  :  MSK:  Integumentary:  Neurological:  Psychiatric:  Endocrine:  Hematologic/Lymphatic:  Allergic/Immunologic:  [ ] All other systems negative  [x] Unable to assess ROS because Unable to assess due to intubation    OBJECTIVE:  ICU Vital Signs Last 24 Hrs  T(C): 37.4 (23 Sep 2017 05:00), Max: 38 (22 Sep 2017 16:30)  T(F): 99.3 (23 Sep 2017 05:00), Max: 100.4 (22 Sep 2017 16:30)  HR: 96 (23 Sep 2017 07:00) (68 - 116)  BP: 106/60 (22 Sep 2017 07:50) (106/60 - 143/71)  BP(mean): 74 (22 Sep 2017 07:50) (74 - 92)  ABP: 136/58 (23 Sep 2017 07:00) (80/44 - 148/60)  ABP(mean): 82 (23 Sep 2017 07:00) (56 - 92)  RR: 23 (23 Sep 2017 07:00) (11 - 23)  SpO2: 95% (23 Sep 2017 07:00) (76% - 100%)    Mode: AC/ CMV (Assist Control/ Continuous Mandatory Ventilation), RR (machine): 14, TV (machine): 500, FiO2: 40, PEEP: 5, ITime: 1, MAP: 8, PIP: 18    09-22 @ 07:01 - 09-23 @ 07:00  --------------------------------------------------------  IN: 2268.1 mL / OUT: 30 mL / NET: 2238.1 mL    09-23 @ 07:01 - 09-23 @ 07:42  --------------------------------------------------------  IN: 25 mL / OUT: 0 mL / NET: 25 mL      CAPILLARY BLOOD GLUCOSE  142 (23 Sep 2017 05:00)    PHYSICAL EXAM:  General: NAD  HEENT: NCAT, Sclera nonicteric, PERRLA  Lymph Nodes: No LAD  Neck: ET tube inplace. Trachea midline, thyroid non palpable.  Respiratory:  CTAB, no wheezes, rales or rhonchi.   Cardiovascular: Normal S1, S2, no murmurs rubs or gallops  Abdomen: Soft, Nondistended, Nontender. +BS x4.  Extremities: No clubbing, cyanosis or edema.  Skin: Clean, Dry Intact.  Neurological: Intubated.  Psychiatry: Intubated.    HOSPITAL MEDICATIONS:  MEDICATIONS  (STANDING):  tamsulosin 0.4 milliGRAM(s) Oral at bedtime  clopidogrel Tablet 75 milliGRAM(s) Oral daily  busPIRone 5 milliGRAM(s) Oral two times a day  amiodarone Infusion 1 mG/Min (33.333 mL/Hr) IV Continuous <Continuous>  pantoprazole  Injectable 40 milliGRAM(s) IV Push daily  atorvastatin 40 milliGRAM(s) Oral at bedtime  piperacillin/tazobactam IVPB. 3.375 Gram(s) IV Intermittent every 12 hours  aspirin  chewable 81 milliGRAM(s) Oral daily  insulin lispro (HumaLOG) corrective regimen sliding scale   SubCutaneous every 6 hours    MEDICATIONS  (PRN):      LABS:                        7.9    22.8  )-----------( 168      ( 23 Sep 2017 05:01 )             23.5     09-23    134<L>  |  94<L>  |  56<H>  ----------------------------<  141<H>  4.3   |  19<L>  |  6.13<H>    Ca    8.4      23 Sep 2017 05:01  Phos  5.8     09-23  Mg     2.0     09-23    TPro  6.4  /  Alb  2.9<L>  /  TBili  0.5  /  DBili  x   /  AST  336<H>  /  ALT  95<H>  /  AlkPhos  77  09-23    PT/INR - ( 22 Sep 2017 04:17 )   PT: 15.9 sec;   INR: 1.46 ratio         PTT - ( 23 Sep 2017 05:01 )  PTT:37.9 sec    Arterial Blood Gas:  09-23 @ 04:59  7.43/31/129/20/99/-3.3  ABG lactate: --  Arterial Blood Gas:  09-22 @ 14:25  7.39/38/139/23/99/-1.6  ABG lactate: --  Arterial Blood Gas:  09-22 @ 09:32  7.34/46/110/24/98/-1.3  ABG lactate: --  Arterial Blood Gas:  09-22 @ 08:08  7.33/46/204/24/100/-1.5  ABG lactate: --  Arterial Blood Gas:  09-22 @ 00:25  7.39/34/374/20/100/-3.8  ABG lactate: --  Arterial Blood Gas:  09-21 @ 22:46  7.46/34/87/24/97/.3  ABG lactate: --    Venous Blood Gas:  09-22 @ 04:19  7.35/49/41/26/66  VBG Lactate: 2.2      MICROBIOLOGY:     RADIOLOGY:  [ ] Reviewed and interpreted by me    EKG:    ASSESSMENT AND PLAN: CHIEF COMPLAINT: Cardiac arrest.    Interval Events: Afib 's ON, HDS. Amiodarone continued, resolved. H/H continuing to drop s/p 1.5 u pRBC, CTH AP performed pending read. Fentanyl undialyzable as per conversation with renal, plan for HD today.    REVIEW OF SYSTEMS:  Constitutional:   Eyes:  ENT:  CV:  Resp:  GI:  :  MSK:  Integumentary:  Neurological:  Psychiatric:  Endocrine:  Hematologic/Lymphatic:  Allergic/Immunologic:  [ ] All other systems negative  [x] Unable to assess ROS because Unable to assess due to intubation    OBJECTIVE:  ICU Vital Signs Last 24 Hrs  T(C): 37.4 (23 Sep 2017 05:00), Max: 38 (22 Sep 2017 16:30)  T(F): 99.3 (23 Sep 2017 05:00), Max: 100.4 (22 Sep 2017 16:30)  HR: 96 (23 Sep 2017 07:00) (68 - 116)  BP: 106/60 (22 Sep 2017 07:50) (106/60 - 143/71)  BP(mean): 74 (22 Sep 2017 07:50) (74 - 92)  ABP: 136/58 (23 Sep 2017 07:00) (80/44 - 148/60)  ABP(mean): 82 (23 Sep 2017 07:00) (56 - 92)  RR: 23 (23 Sep 2017 07:00) (11 - 23)  SpO2: 95% (23 Sep 2017 07:00) (76% - 100%)    Mode: AC/ CMV (Assist Control/ Continuous Mandatory Ventilation), RR (machine): 14, TV (machine): 500, FiO2: 40, PEEP: 5, ITime: 1, MAP: 8, PIP: 18    09-22 @ 07:01 - 09-23 @ 07:00  --------------------------------------------------------  IN: 2268.1 mL / OUT: 30 mL / NET: 2238.1 mL    09-23 @ 07:01 - 09-23 @ 07:42  --------------------------------------------------------  IN: 25 mL / OUT: 0 mL / NET: 25 mL      CAPILLARY BLOOD GLUCOSE  142 (23 Sep 2017 05:00)    PHYSICAL EXAM:  General: NAD  HEENT: NCAT, Sclera nonicteric, PERRLA  Lymph Nodes: No LAD  Neck: ET tube inplace. Trachea midline, thyroid non palpable.  Respiratory:  CTAB, no wheezes, rales or rhonchi.   Cardiovascular: Normal S1, S2, no murmurs rubs or gallops  Abdomen: Soft, Nondistended, Nontender. +BS x4.  Extremities: No clubbing, cyanosis or edema. Hematoma in the L groin stable.  Skin: Clean, Dry Intact.  Neurological: Intubated. Opening eyes spontaneously. Responsive to noxious stimuli on the L hand side, not the R.   Psychiatry: Intubated.    HOSPITAL MEDICATIONS:  MEDICATIONS  (STANDING):  tamsulosin 0.4 milliGRAM(s) Oral at bedtime  clopidogrel Tablet 75 milliGRAM(s) Oral daily  busPIRone 5 milliGRAM(s) Oral two times a day  amiodarone Infusion 1 mG/Min (33.333 mL/Hr) IV Continuous <Continuous>  pantoprazole  Injectable 40 milliGRAM(s) IV Push daily  atorvastatin 40 milliGRAM(s) Oral at bedtime  piperacillin/tazobactam IVPB. 3.375 Gram(s) IV Intermittent every 12 hours  aspirin  chewable 81 milliGRAM(s) Oral daily  insulin lispro (HumaLOG) corrective regimen sliding scale   SubCutaneous every 6 hours    MEDICATIONS  (PRN):      LABS:                        7.9    22.8  )-----------( 168      ( 23 Sep 2017 05:01 )             23.5     09-23    134<L>  |  94<L>  |  56<H>  ----------------------------<  141<H>  4.3   |  19<L>  |  6.13<H>    Ca    8.4      23 Sep 2017 05:01  Phos  5.8     09-23  Mg     2.0     09-23    TPro  6.4  /  Alb  2.9<L>  /  TBili  0.5  /  DBili  x   /  AST  336<H>  /  ALT  95<H>  /  AlkPhos  77  09-23    PT/INR - ( 22 Sep 2017 04:17 )   PT: 15.9 sec;   INR: 1.46 ratio         PTT - ( 23 Sep 2017 05:01 )  PTT:37.9 sec    Arterial Blood Gas:  09-23 @ 04:59  7.43/31/129/20/99/-3.3  ABG lactate: --  Arterial Blood Gas:  09-22 @ 14:25  7.39/38/139/23/99/-1.6  ABG lactate: --  Arterial Blood Gas:  09-22 @ 09:32  7.34/46/110/24/98/-1.3  ABG lactate: --  Arterial Blood Gas:  09-22 @ 08:08  7.33/46/204/24/100/-1.5  ABG lactate: --  Arterial Blood Gas:  09-22 @ 00:25  7.39/34/374/20/100/-3.8  ABG lactate: --  Arterial Blood Gas:  09-21 @ 22:46  7.46/34/87/24/97/.3  ABG lactate: --    Venous Blood Gas:  09-22 @ 04:19  7.35/49/41/26/66  VBG Lactate: 2.2      MICROBIOLOGY:     RADIOLOGY:  [ ] Reviewed and interpreted by me    EKG:    ASSESSMENT AND PLAN:

## 2017-09-23 NOTE — PROGRESS NOTE ADULT - PROBLEM SELECTOR PLAN 1
Cardioembolic shower secondary to atrial fibrillation.   Strokes are small enough to warrant anticoagulation.  Primary team to choose heparin-- recommend patient specific nomogram with no bolus and goal ptt 50-70. Discussed with covering resident   - MRI Brain, MRA Head/Neck when stable   - can Rp Head CT within 24-48 hrs if unable to obtain MRI Brain

## 2017-09-23 NOTE — CHART NOTE - NSCHARTNOTEFT_GEN_A_CORE
====================  CCU MIDNIGHT ROUNDS  ====================    JIMI BUCHANAN  [id number]  Patient is a 72y old  Male who presents with a chief complaint of s/p cardiac cath (22 Sep 2017 12:34)      ====================  SUMMARY:  73 yo M w extensive CAD, T2DM, ESRD new HD, prior CVA who was transferred from OSH for NSTEMI s/p impella assisted C 1wk ago being treated for pyelonephritis with course c/b obstructive nephropathy requiring HD transferred from floor to CCU after cardiac arrest, vtach and subsequent ROSC.    ====================        ====================  NEW EVENTS: 1900 HD in progress no acute distress noted  2000 patient using accessory muscles, increase HR, and BP ordered and administered Fentanyl 25mcg with good effects  ====================        ====================  VITALS (Last 12 hrs):  ====================    T(C): 37.5 (09-23-17 @ 20:00), Max: 38.2 (09-23-17 @ 10:00)  T(F): 99.5 (09-23-17 @ 20:00), Max: 100.7 (09-23-17 @ 10:00)  HR: 102 (09-23-17 @ 20:12) (80 - 114)  BP: --  BP(mean): --  ABP: 160/70 (09-23-17 @ 20:00) (106/56 - 160/70)  ABP(mean): 104 (09-23-17 @ 20:00) (68 - 104)  RR: 28 (09-23-17 @ 20:00) (18 - 32)  SpO2: 100% (09-23-17 @ 20:12) (95% - 100%)  Wt(kg): --  CVP(mm Hg): 10 (09-23-17 @ 20:00) (5 - 11)  CVP(cm H2O): --  CO: --  CI: --  PA: --  PA(mean): --  PCWP: --  SVR: --  PVR: --    I&O's Summary    22 Sep 2017 07:01  -  23 Sep 2017 07:00  --------------------------------------------------------  IN: 2268.1 mL / OUT: 30 mL / NET: 2238.1 mL    23 Sep 2017 07:01  -  23 Sep 2017 20:57  --------------------------------------------------------  IN: 487.1 mL / OUT: 13 mL / NET: 474.1 mL        Mode: AC/ CMV (Assist Control/ Continuous Mandatory Ventilation)  RR (machine): 14  TV (machine): 500  FiO2: 40  PEEP: 5  ITime: 1  MAP: 10  PIP: 18      ====================  NEW LABS:  ====================                          7.9    22.8  )-----------( 168      ( 23 Sep 2017 05:01 )             23.5     09-23    134<L>  |  94<L>  |  56<H>  ----------------------------<  141<H>  4.3   |  19<L>  |  6.13<H>    Ca    8.4      23 Sep 2017 05:01  Phos  5.8     09-23  Mg     2.0     09-23    TPro  6.4  /  Alb  2.9<L>  /  TBili  0.5  /  DBili  x   /  AST  336<H>  /  ALT  95<H>  /  AlkPhos  77  09-23    PT/INR - ( 22 Sep 2017 04:17 )   PT: 15.9 sec;   INR: 1.46 ratio         PTT - ( 23 Sep 2017 05:01 )  PTT:37.9 sec  Creatine Kinase, Serum: 729 U/L <H> (09-23-17 @ 05:01)  Troponin T, Serum: 7.64 ng/mL <H> (09-23-17 @ 05:01)  Creatine Kinase, Serum: 865 U/L <H> (09-22-17 @ 21:57)  Troponin T, Serum: 8.14 ng/mL <H> (09-22-17 @ 21:57)    CKMB Units: 17.7 ng/mL (09-23 @ 05:01)  CKMB Units: 26.8 ng/mL (09-22 @ 21:57)    ABG - ( 23 Sep 2017 04:59 )  pH: 7.43  /  pCO2: 31    /  pO2: 129   / HCO3: 20    / Base Excess: -3.3  /  SaO2: 99          ====================  PLAN:  ====================  #Neuro       Intubated, Sedation d/c.      - Possible hypoperfusion/ischemia in brain              F.u CT Head.    #CVS     - S/p Cardiac arrest/vtach               Afib with RVR: Continue amiodarone.               C/w amio gtt. Arrythmia likely triggered by electrolyte abnormalities in the setting of pt refusing HD for few days.                Given the recent NSTEMI, complicated cath, ischemia/cath complication incl. instent thrombosis a possibility. Cardiac enzymes  elevated, possible s/p code.     - c/w aspirin, plavix, lipitor.        #Pulm:      - Acute hypoxic respiratory failure in the setting of cardiac arrest              Will try CPAP today and plan to extubate if successful.        #GI:        NG tube in place, pt NPO except for medication.     #Renal:      - Pyelo with LIN progressing to ESRD requiring HD:  on Vanc and Zosyn since 9/22. Cultures pending. OSH contacted for culture data. Acute leukocytosis more likely 2/2 cardiac arrest related systemic inflammatory response.                 HD per renal        #ID:     On Vanc and Zosyn since 9/22. Cultures pending. OSH contacted for culture data.  Hx of Pyelonephritis.    #Endo        Transitioned to insulin SS.    #Heme: Inappropriate response to 1.5 units of pRBC, will f/u read of CTAP for source of possible bleeding.

## 2017-09-23 NOTE — PROGRESS NOTE ADULT - ATTENDING COMMENTS
Patient is seen and examined with fellow, NP and the CCU house-staff. I agree with the history, physical and the assessment and plan.  CT c/w new stroke - neuro following  CPAP trial today  on DAPT  rate/rhythm control with Amiodarone  d/w neuro regarding AC with heparin

## 2017-09-23 NOTE — PROGRESS NOTE ADULT - SUBJECTIVE AND OBJECTIVE BOX
Patient seen in follow up for dialysis dependent LIN. 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 Cardiac status preventing urologic intervention.   Events noted, remains in CCU off pressors, comfortable, responsive, vented, does not follow commands. Scant U.O. Started on antibiotics.    PMH:  CVA (cerebral vascular accident)  COPD (chronic obstructive pulmonary disease)  BPH (benign prostatic hyperplasia)  Bipolar affective disorder  CKD (chronic kidney disease)  Pancreatitis  Hypertension  Dyslipidemia  Diabetes mellitus  Coronary artery disease  Cardiomyopathy    MEDICATIONS  (STANDING):  tamsulosin 0.4 milliGRAM(s) Oral at bedtime  clopidogrel Tablet 75 milliGRAM(s) Oral daily  busPIRone 5 milliGRAM(s) Oral two times a day  amiodarone Infusion 1 mG/Min (33.333 mL/Hr) IV Continuous <Continuous>  pantoprazole  Injectable 40 milliGRAM(s) IV Push daily  atorvastatin 40 milliGRAM(s) Oral at bedtime  aspirin  chewable 81 milliGRAM(s) Oral daily  insulin lispro (HumaLOG) corrective regimen sliding scale   SubCutaneous every 6 hours  amiodarone Infusion 1 mG/Min (33.333 mL/Hr) IV Continuous <Continuous>  vancomycin  IVPB 1000 milliGRAM(s) IV Intermittent once  meropenem IVPB 500 milliGRAM(s) IV Intermittent every 24 hours    MEDICATIONS  (PRN):    T(C): 38.2 (09-23-17 @ 10:00), Max: 38.3 (09-23-17 @ 08:00)  HR: 96 (09-23-17 @ 13:00) (64 - 116)  BP: 106/60 (09-22-17 @ 07:50) (72/44 - 143/71)  RR: 23 (09-23-17 @ 13:00) (11 - 24)  SpO2: 100% (09-23-17 @ 13:00) (76% - 100%)  Wt(kg): --  I&O's Detail    22 Sep 2017 07:01  -  23 Sep 2017 07:00  --------------------------------------------------------  IN:    amiodarone Infusion: 200.4 mL    amiodarone Infusion: 200.4 mL    Enteral Tube Flush: 420 mL    insulin Infusion: 57 mL    IV PiggyBack: 350 mL    norepinephrine Infusion: 15.3 mL    Packed Red Blood Cells: 525 mL    Sodium Chloride 0.9% IV Bolus: 500 mL  Total IN: 2268.1 mL    OUT:    Indwelling Catheter - Urethral: 30 mL  Total OUT: 30 mL    Total NET: 2238.1 mL      23 Sep 2017 07:01  -  23 Sep 2017 13:47  --------------------------------------------------------  IN:    amiodarone Infusion: 100.2 mL    Enteral Tube Flush: 120 mL    IV PiggyBack: 50 mL  Total IN: 270.2 mL    OUT:    Indwelling Catheter - Urethral: 7 mL  Total OUT: 7 mL    Total NET: 263.2 mL          PHYSICAL EXAM:  General: NAD, vented. Right radial A. Line, Right I.J. HD catheter, left Triple lumen, Landa  Respiratory: b/l air entry, clear anteriorly  Cardiovascular: S1 S2 reg  Gastrointestinal: soft, NT, BS present  Extremities:  no edema, feet are cool to touch  No rash    CBC Full  -  ( 23 Sep 2017 05:01 )  WBC Count : 22.8 K/uL  Hemoglobin : 7.9 g/dL  Hematocrit : 23.5 %  Platelet Count - Automated : 168 K/uL  Mean Cell Volume : 87.4 fl  Mean Cell Hemoglobin : 29.5 pg  Mean Cell Hemoglobin Concentration : 33.7 gm/dL  Auto Neutrophil # : 19.3 K/uL  Auto Lymphocyte # : 1.8 K/uL  Auto Monocyte # : 1.6 K/uL  Auto Eosinophil # : 0.0 K/uL  Auto Basophil # : 0.1 K/uL  Auto Neutrophil % : 84.7 %  Auto Lymphocyte % : 7.8 %  Auto Monocyte % : 7.0 %  Auto Eosinophil % : 0.2 %  Auto Basophil % : 0.3 %    09-23    134<L>  |  94<L>  |  56<H>  ----------------------------<  141<H>  4.3   |  19<L>  |  6.13<H>    Ca    8.4      23 Sep 2017 05:01  Phos  5.8     09-23  Mg     2.0     09-23    TPro  6.4  /  Alb  2.9<L>  /  TBili  0.5  /  DBili  x   /  AST  336<H>  /  ALT  95<H>  /  AlkPhos  77  09-23    ABG - ( 23 Sep 2017 04:59 )  pH: 7.43  /  pCO2: 31    /  pO2: 129   / HCO3: 20    / Base Excess: -3.3  /  SaO2: 99                  Sodium, Serum: 134 (09-23 @ 05:01)  Sodium, Serum: 134 (09-22 @ 21:57)  Sodium, Serum: 134 (09-22 @ 14:40)  Sodium, Serum: 135 (09-22 @ 08:18)  Sodium, Serum: 135 (09-22 @ 04:17)  Sodium, Serum: 136 (09-22 @ 00:31)  Sodium, Serum: 136 (09-21 @ 23:04)  Sodium, Serum: 134 (09-21 @ 03:59)  Sodium, Serum: 136 (09-20 @ 15:02)    Creatinine, Serum: 6.13 (09-23 @ 05:01)  Creatinine, Serum: 5.70 (09-22 @ 21:57)  Creatinine, Serum: 4.86 (09-22 @ 14:40)  Creatinine, Serum: 4.61 (09-22 @ 08:18)  Creatinine, Serum: 3.84 (09-22 @ 04:17)  Creatinine, Serum: 3.28 (09-22 @ 00:31)  Creatinine, Serum: 3.10 (09-21 @ 23:04)  Creatinine, Serum: 8.92 (09-21 @ 03:59)  Creatinine, Serum: 8.96 (09-20 @ 15:02)    Potassium, Serum: 4.3 (09-23 @ 05:01)  Potassium, Serum: 4.3 (09-22 @ 21:57)  Potassium, Serum: 4.8 (09-22 @ 14:40)  Potassium, Serum: 4.7 (09-22 @ 08:18)  Potassium, Serum: 4.6 (09-22 @ 04:17)  Potassium, Serum: 4.0 (09-22 @ 00:31)  Potassium, Serum: 3.6 (09-21 @ 23:04)  Potassium, Serum: 3.5 (09-21 @ 03:59)  Potassium, Serum: 3.9 (09-20 @ 15:02)    Hemoglobin: 7.9 (09-23 @ 05:01)  Hemoglobin: 8.4 (09-22 @ 21:57)  Hemoglobin: 7.4 (09-22 @ 14:40)  Hemoglobin: 7.3 (09-22 @ 08:18)  Hemoglobin: 8.5 (09-22 @ 04:17)  Hemoglobin: 8.4 (09-22 @ 00:31)  Hemoglobin: 8.9 (09-21 @ 23:04)  Hemoglobin: 8.9 (09-21 @ 03:59)  Hemoglobin: 8.4 (09-21 @ 01:17)  Hemoglobin: 7.8 (09-20 @ 15:02)

## 2017-09-23 NOTE — PROGRESS NOTE ADULT - SUBJECTIVE AND OBJECTIVE BOX
Subjective    Patient seen and examined. Afib 's ON resolved with amiodarone drip. Pt obtained CT a/p overnight which shows resolution of hydro with migration of UVJ stone. No longer obstructing stones on CT. Patient remains critically ill on dialysis.    Objective    T(C): 38.2 (09-23-17 @ 10:00), Max: 38.3 (09-23-17 @ 08:00)  HR: 108 (09-23-17 @ 10:00) (68 - 116)  BP: --  ABP: 126/58 (09-23-17 @ 10:00) (104/46 - 148/60)  ABP(mean): 80 (09-23-17 @ 10:00) (62 - 102)  RR: 23 (09-23-17 @ 10:00) (12 - 23)  SpO2: 100% (09-23-17 @ 10:00) (93% - 100%)  Wt(kg): --  CVP(mm Hg): 6 (09-23-17 @ 10:00) (2 - 9)      09-22 @ 07:01  -  09-23 @ 07:00  --------------------------------------------------------  IN:    amiodarone Infusion: 200.4 mL    amiodarone Infusion: 200.4 mL    Enteral Tube Flush: 420 mL    insulin Infusion: 57 mL    IV PiggyBack: 350 mL    norepinephrine Infusion: 15.3 mL    Packed Red Blood Cells: 525 mL    Sodium Chloride 0.9% IV Bolus: 500 mL  Total IN: 2268.1 mL    OUT:    Indwelling Catheter - Urethral: 30 mL  Total OUT: 30 mL    Total NET: 2238.1 mL      09-23 @ 07:01  -  09-23 @ 10:34  --------------------------------------------------------  IN:    amiodarone Infusion: 50.1 mL    Enteral Tube Flush: 60 mL    IV PiggyBack: 50 mL  Total IN: 160.1 mL    OUT:    Indwelling Catheter - Urethral: 5 mL  Total OUT: 5 mL    Total NET: 155.1 mL      General: NAD  Abdomen: soft/non-tender/non-distended  : james clear yellow, mild sediment, draining well                                               7.9                   Neurophils% (auto):   84.7   (09-23 @ 05:01):    22.8 )-----------(168          Lymphocytes% (auto):  7.8                                           23.5                   Eosinphils% (auto):   0.2      Manual%: Neutrophils x    ; Lymphocytes x    ; Eosinophils x    ; Bands%: x    ; Blasts x          09-23    134<L>  |  94<L>  |  56<H>  ----------------------------<  141<H>  4.3   |  19<L>  |  6.13<H>    Ca    8.4      23 Sep 2017 05:01  Phos  5.8     09-23  Mg     2.0     09-23    TPro  6.4  /  Alb  2.9<L>  /  TBili  0.5  /  DBili  x   /  AST  336<H>  /  ALT  95<H>  /  AlkPhos  77  09-23    ( 09-23 @ 05:01 )   PT: x    ;   INR: x      aPTT: 37.9 sec    ABG - ( 23 Sep 2017 04:59 )  pH: 7.43  /  pCO2: 31    /  pO2: 129   / HCO3: 20    / Base Excess: -3.3  /  SaO2: 99    / Lactate: x        VBG - ( 22 Sep 2017 04:19 )  pH: 7.35  /  pCO2: 49    /  pO2: 41    / HCO3: 26    / Base Excess: 1.0   /  SvO2: 66    / Lactate: 2.2      CT A/P:  KIDNEYS/URETERS: The right hydronephrosis and hydroureter have resolved.  Nonobstructing calculi in the upper and midpole of the right kidney, the  largest of which measures 4 mm in the right upper pole. Right renal vascular  calcifications are unchanged. The calculi at the right UVJ has migrated into  the bladder.    The left kidney is atrophic, a finding which is unchanged. The  nonobstructing calculi, the largest of which measures 5 mm, are unchanged.  No left hydronephrosis or hydroureter.

## 2017-09-23 NOTE — PROGRESS NOTE ADULT - ASSESSMENT
71 y/o M with Right functional solitary kidney previously obstructed with ureteral stone c/b renal failure requiring initiation of hemodialysis, new NSTEMI s/p cardiac cath with 5 stents. Patient has extensive high risk cardiac disease with multiple prior stents. Patient recently coded following suspected NSTEMI and stroke.    --CT A/P demonstrates passage of stone and resolution of hydronephrosis.  --No urological intervention warranted following stone passage.  --Monitor creatinine and follow nephrology recommendations.  --continue flomax, strain urine, strict i/o  --f/up medicine, cards, nephrology

## 2017-09-23 NOTE — PROGRESS NOTE ADULT - SUBJECTIVE AND OBJECTIVE BOX
S: called to bedside for CT results and tele revealed AF    O:  ICU Vital Signs Last 24 Hrs  T(C): 38.2 (23 Sep 2017 10:00), Max: 38.3 (23 Sep 2017 08:00)  T(F): 100.7 (23 Sep 2017 10:00), Max: 101 (23 Sep 2017 08:00)  HR: 96 (23 Sep 2017 13:00) (74 - 116)  BP: --  BP(mean): --  ABP: 126/56 (23 Sep 2017 13:00) (104/46 - 152/66)  ABP(mean): 76 (23 Sep 2017 13:00) (62 - 102)  RR: 23 (23 Sep 2017 13:00) (13 - 24)  SpO2: 100% (23 Sep 2017 13:00) (94% - 100%) S: called to bedside for CT results and tele revealed AF    O:  ICU Vital Signs Last 24 Hrs  T(C): 38.2 (23 Sep 2017 10:00), Max: 38.3 (23 Sep 2017 08:00)  T(F): 100.7 (23 Sep 2017 10:00), Max: 101 (23 Sep 2017 08:00)  HR: 96 (23 Sep 2017 13:00) (74 - 116)  BP: --  BP(mean): --  ABP: 126/56 (23 Sep 2017 13:00) (104/46 - 152/66)  ABP(mean): 76 (23 Sep 2017 13:00) (62 - 102)  RR: 23 (23 Sep 2017 13:00) (13 - 24)  SpO2: 100% (23 Sep 2017 13:00) (94% - 100%)    PHYSICAL EXAM:   General appearance: Intubated. No sedation.             Mental Status: Intubated, does not follow commands, eyes open spontaneously and attentive/tracking  Cranial Nerves: EOMI, no obvious visual neglect, PERRL, face symmetric  Motor: 3/5 RUE, at least 4/5 LUE, 3/5 b/l LE moving left side spontaneously  Sensation: no withdrawal to pain x4 extremities.   Coordination: unable to assess                          7.9    22.8  )-----------( 168      ( 23 Sep 2017 05:01 )             23.5   09-23    134<L>  |  94<L>  |  56<H>  ----------------------------<  141<H>  4.3   |  19<L>  |  6.13<H>    Ca    8.4      23 Sep 2017 05:01  Phos  5.8     09-23  Mg     2.0     09-23    TPro  6.4  /  Alb  2.9<L>  /  TBili  0.5  /  DBili  x   /  AST  336<H>  /  ALT  95<H>  /  AlkPhos  77  09-23  Troponin T, Serum: 7.64: Reference Interval for Troponin T  Less than 0.06 ng/mL - includes the 99th percentile of a healthy  population at a method C.V. of 10% or less.  NOTE: Troponin T is measured by the Roche ECLIA method and these  results are not interchangable with Tro7.64: ponin I results since they are  different assays with different reference intervals. ng/mL (09.23.17 @ 05:01)    < from: CT Head No Cont (09.23.17 @ 06:32) >  There are multiple areas of hypodensities within bilateral cerebral   hemispheres, as well as the bilateral cerebellar hemispheres. Findings   are concerning for embolic infarcts.    Chronic right occipital, left basal ganglia and right thalamic infarcts   are again noted.    There is no CT evidence of acute intracranial hemorrhage, extra-axial   collection, vasogenic edema, mass effect, midline shift, central   herniation, or hydrocephalus.     There is age-appropriate cerebral volume loss. There is moderate patchy   white matter hypoattenuation which is likely related to chronic   microvascular ischemic disease.    < end of copied text >

## 2017-09-23 NOTE — PROGRESS NOTE ADULT - ASSESSMENT
73YO [place] speaking male with CAD s/p 12-14  stents placed [phone], DM. HTN HLD, CKD, bipolar transferred 9/12 for cardiac cath s/p 4-5 stents and s/p code blue today after 18 minutes of CPR/compressions, with code stroke called for decreased movement on R side--was not a tpa candidate.

## 2017-09-23 NOTE — PROGRESS NOTE ADULT - ATTENDING COMMENTS
VASCULAR NEUROLOGY ATTENDING  The patient is seen and examined the history and imaging are reviewed. I agree with the resident note unless otherwise noted. Suspect cardioembolism in the setting of PAF and markedly reduced EF. No neurologic contraindication to heparin to PTT 60-70. May obtain MRI when able to determine precise infarct burden.

## 2017-09-23 NOTE — PROGRESS NOTE ADULT - ASSESSMENT
HD dependent LIN due to an obstructed solitary functioning Right kidney and probable ischemic injury.   S/p arrest on 9/21/17, s/p NSTEMI -- S/p impella assisted multivessel BABAR X5.  Vented, unresponsive.  Awaiting urological intervention to decompress obstructed solitary kidney pending Cardiac and overall stabilization.  For dialysis today via temporary access, fluid removal as tolerable.

## 2017-09-23 NOTE — PROGRESS NOTE ADULT - ASSESSMENT
71 yo M w extensive CAD, T2DM, ESRD new HD, prior CVA who was transferred from OSH for NSTEMI s/p impella assisted LHC 1wk ago being treated for pyelonephritis with course c/b obstructive nephropathy requiring HD transferred from floor to CCU after cardiac arrest, vtach and subsequent ROSC.    #Neuro       Intubated, Sedation d/c.      - Possible hypoperfusion/ischemia in brain              F.u CT Head.    #CVS     - S/p Cardiac arrest/vtach               Afib with RVR: Continue amiodarone.               C/w amio gtt. Arrythmia likely triggered by electrolyte abnormalities in the setting of pt refusing HD for few days.                Given the recent NSTEMI, complicated cath, ischemia/cath complication incl. instent thrombosis a possibility. Cardiac enzymes  elevated, possible s/p code.     - c/w aspirin, plavix, lipitor.        #Pulm:      - Acute hypoxic respiratory failure in the setting of cardiac arrest              Will try CPAP today and plan to extubate if successful.        #GI:        NG tube in place, pt NPO except for medication.     #Renal:      - Pyelo with LIN progressing to ESRD requiring HD:  on Vanc and Zosyn since 9/22. Cultures pending. OSH contacted for culture data. Acute leukocytosis more likely 2/2 cardiac arrest related systemic inflammatory response.                 HD per renal        #ID:     On Vanc and Zosyn since 9/22. Cultures pending. OSH contacted for culture data.  Hx of Pyelonephritis.    #Endo        Transitioned to insulin SS.    #Heme: Inappropriate response to 1.5 units of pRBC, will f/u read of CTAP for source of possible bleeding.

## 2017-09-24 LAB
ALBUMIN SERPL ELPH-MCNC: 3 G/DL — LOW (ref 3.3–5)
ALBUMIN SERPL ELPH-MCNC: 3.1 G/DL — LOW (ref 3.3–5)
ALP SERPL-CCNC: 78 U/L — SIGNIFICANT CHANGE UP (ref 40–120)
ALP SERPL-CCNC: 83 U/L — SIGNIFICANT CHANGE UP (ref 40–120)
ALT FLD-CCNC: 67 U/L RC — HIGH (ref 10–45)
ALT FLD-CCNC: 70 U/L RC — HIGH (ref 10–45)
ANION GAP SERPL CALC-SCNC: 15 MMOL/L — SIGNIFICANT CHANGE UP (ref 5–17)
ANION GAP SERPL CALC-SCNC: 18 MMOL/L — HIGH (ref 5–17)
AST SERPL-CCNC: 158 U/L — HIGH (ref 10–40)
AST SERPL-CCNC: 176 U/L — HIGH (ref 10–40)
BASOPHILS # BLD AUTO: 0 K/UL — SIGNIFICANT CHANGE UP (ref 0–0.2)
BASOPHILS NFR BLD AUTO: 0.1 % — SIGNIFICANT CHANGE UP (ref 0–2)
BILIRUB SERPL-MCNC: 0.7 MG/DL — SIGNIFICANT CHANGE UP (ref 0.2–1.2)
BILIRUB SERPL-MCNC: 0.7 MG/DL — SIGNIFICANT CHANGE UP (ref 0.2–1.2)
BLD GP AB SCN SERPL QL: NEGATIVE — SIGNIFICANT CHANGE UP
BUN SERPL-MCNC: 32 MG/DL — HIGH (ref 7–23)
BUN SERPL-MCNC: 38 MG/DL — HIGH (ref 7–23)
CALCIUM SERPL-MCNC: 8.5 MG/DL — SIGNIFICANT CHANGE UP (ref 8.4–10.5)
CALCIUM SERPL-MCNC: 8.6 MG/DL — SIGNIFICANT CHANGE UP (ref 8.4–10.5)
CHLORIDE SERPL-SCNC: 92 MMOL/L — LOW (ref 96–108)
CHLORIDE SERPL-SCNC: 95 MMOL/L — LOW (ref 96–108)
CO2 SERPL-SCNC: 24 MMOL/L — SIGNIFICANT CHANGE UP (ref 22–31)
CO2 SERPL-SCNC: 24 MMOL/L — SIGNIFICANT CHANGE UP (ref 22–31)
CREAT SERPL-MCNC: 4.17 MG/DL — HIGH (ref 0.5–1.3)
CREAT SERPL-MCNC: 4.66 MG/DL — HIGH (ref 0.5–1.3)
CULTURE RESULTS: SIGNIFICANT CHANGE UP
CULTURE RESULTS: SIGNIFICANT CHANGE UP
EOSINOPHIL # BLD AUTO: 0 K/UL — SIGNIFICANT CHANGE UP (ref 0–0.5)
EOSINOPHIL NFR BLD AUTO: 0 % — SIGNIFICANT CHANGE UP (ref 0–6)
GAS PNL BLDA: SIGNIFICANT CHANGE UP
GLUCOSE SERPL-MCNC: 195 MG/DL — HIGH (ref 70–99)
GLUCOSE SERPL-MCNC: 211 MG/DL — HIGH (ref 70–99)
HCT VFR BLD CALC: 22.1 % — LOW (ref 39–50)
HCT VFR BLD CALC: 23.7 % — LOW (ref 39–50)
HCT VFR BLD CALC: 23.9 % — LOW (ref 39–50)
HCT VFR BLD CALC: 25.7 % — LOW (ref 39–50)
HGB BLD-MCNC: 7.6 G/DL — LOW (ref 13–17)
HGB BLD-MCNC: 8.1 G/DL — LOW (ref 13–17)
HGB BLD-MCNC: 8.2 G/DL — LOW (ref 13–17)
HGB BLD-MCNC: 8.7 G/DL — LOW (ref 13–17)
LYMPHOCYTES # BLD AUTO: 1.1 K/UL — SIGNIFICANT CHANGE UP (ref 1–3.3)
LYMPHOCYTES # BLD AUTO: 5 % — LOW (ref 13–44)
MAGNESIUM SERPL-MCNC: 2.1 MG/DL — SIGNIFICANT CHANGE UP (ref 1.6–2.6)
MAGNESIUM SERPL-MCNC: 2.1 MG/DL — SIGNIFICANT CHANGE UP (ref 1.6–2.6)
MCHC RBC-ENTMCNC: 29.9 PG — SIGNIFICANT CHANGE UP (ref 27–34)
MCHC RBC-ENTMCNC: 30 PG — SIGNIFICANT CHANGE UP (ref 27–34)
MCHC RBC-ENTMCNC: 30.2 PG — SIGNIFICANT CHANGE UP (ref 27–34)
MCHC RBC-ENTMCNC: 30.3 PG — SIGNIFICANT CHANGE UP (ref 27–34)
MCHC RBC-ENTMCNC: 34 GM/DL — SIGNIFICANT CHANGE UP (ref 32–36)
MCHC RBC-ENTMCNC: 34 GM/DL — SIGNIFICANT CHANGE UP (ref 32–36)
MCHC RBC-ENTMCNC: 34.1 GM/DL — SIGNIFICANT CHANGE UP (ref 32–36)
MCHC RBC-ENTMCNC: 34.3 GM/DL — SIGNIFICANT CHANGE UP (ref 32–36)
MCV RBC AUTO: 87.5 FL — SIGNIFICANT CHANGE UP (ref 80–100)
MCV RBC AUTO: 88.1 FL — SIGNIFICANT CHANGE UP (ref 80–100)
MCV RBC AUTO: 88.4 FL — SIGNIFICANT CHANGE UP (ref 80–100)
MCV RBC AUTO: 88.7 FL — SIGNIFICANT CHANGE UP (ref 80–100)
MONOCYTES # BLD AUTO: 1.3 K/UL — HIGH (ref 0–0.9)
MONOCYTES NFR BLD AUTO: 5.9 % — SIGNIFICANT CHANGE UP (ref 2–14)
NEUTROPHILS # BLD AUTO: 19.4 K/UL — HIGH (ref 1.8–7.4)
NEUTROPHILS NFR BLD AUTO: 88.9 % — HIGH (ref 43–77)
OB PNL STL: NEGATIVE — SIGNIFICANT CHANGE UP
PHOSPHATE SERPL-MCNC: 4.3 MG/DL — SIGNIFICANT CHANGE UP (ref 2.5–4.5)
PHOSPHATE SERPL-MCNC: 4.5 MG/DL — SIGNIFICANT CHANGE UP (ref 2.5–4.5)
PLATELET # BLD AUTO: 143 K/UL — LOW (ref 150–400)
PLATELET # BLD AUTO: 156 K/UL — SIGNIFICANT CHANGE UP (ref 150–400)
PLATELET # BLD AUTO: 158 K/UL — SIGNIFICANT CHANGE UP (ref 150–400)
PLATELET # BLD AUTO: 171 K/UL — SIGNIFICANT CHANGE UP (ref 150–400)
POTASSIUM SERPL-MCNC: 4.1 MMOL/L — SIGNIFICANT CHANGE UP (ref 3.5–5.3)
POTASSIUM SERPL-MCNC: 4.2 MMOL/L — SIGNIFICANT CHANGE UP (ref 3.5–5.3)
POTASSIUM SERPL-SCNC: 4.1 MMOL/L — SIGNIFICANT CHANGE UP (ref 3.5–5.3)
POTASSIUM SERPL-SCNC: 4.2 MMOL/L — SIGNIFICANT CHANGE UP (ref 3.5–5.3)
PROT SERPL-MCNC: 6.3 G/DL — SIGNIFICANT CHANGE UP (ref 6–8.3)
PROT SERPL-MCNC: 6.7 G/DL — SIGNIFICANT CHANGE UP (ref 6–8.3)
RBC # BLD: 2.5 M/UL — LOW (ref 4.2–5.8)
RBC # BLD: 2.71 M/UL — LOW (ref 4.2–5.8)
RBC # BLD: 2.72 M/UL — LOW (ref 4.2–5.8)
RBC # BLD: 2.9 M/UL — LOW (ref 4.2–5.8)
RBC # FLD: 12.6 % — SIGNIFICANT CHANGE UP (ref 10.3–14.5)
RBC # FLD: 12.8 % — SIGNIFICANT CHANGE UP (ref 10.3–14.5)
RH IG SCN BLD-IMP: NEGATIVE — SIGNIFICANT CHANGE UP
SODIUM SERPL-SCNC: 134 MMOL/L — LOW (ref 135–145)
SODIUM SERPL-SCNC: 134 MMOL/L — LOW (ref 135–145)
SPECIMEN SOURCE: SIGNIFICANT CHANGE UP
SPECIMEN SOURCE: SIGNIFICANT CHANGE UP
VANCOMYCIN TROUGH SERPL-MCNC: 22.1 UG/ML — HIGH (ref 10–20)
WBC # BLD: 21.8 K/UL — HIGH (ref 3.8–10.5)
WBC # BLD: 24.1 K/UL — HIGH (ref 3.8–10.5)
WBC # BLD: 24.2 K/UL — HIGH (ref 3.8–10.5)
WBC # BLD: 24.9 K/UL — HIGH (ref 3.8–10.5)
WBC # FLD AUTO: 21.8 K/UL — HIGH (ref 3.8–10.5)
WBC # FLD AUTO: 24.1 K/UL — HIGH (ref 3.8–10.5)
WBC # FLD AUTO: 24.2 K/UL — HIGH (ref 3.8–10.5)
WBC # FLD AUTO: 24.9 K/UL — HIGH (ref 3.8–10.5)

## 2017-09-24 PROCEDURE — 71010: CPT | Mod: 26,77

## 2017-09-24 PROCEDURE — 71010: CPT | Mod: 26

## 2017-09-24 PROCEDURE — 99291 CRITICAL CARE FIRST HOUR: CPT

## 2017-09-24 PROCEDURE — 93010 ELECTROCARDIOGRAM REPORT: CPT

## 2017-09-24 RX ORDER — ACETAMINOPHEN 500 MG
1000 TABLET ORAL ONCE
Qty: 0 | Refills: 0 | Status: COMPLETED | OUTPATIENT
Start: 2017-09-24 | End: 2017-09-24

## 2017-09-24 RX ORDER — HEPARIN SODIUM 5000 [USP'U]/ML
500 INJECTION INTRAVENOUS; SUBCUTANEOUS
Qty: 25000 | Refills: 0 | Status: DISCONTINUED | OUTPATIENT
Start: 2017-09-24 | End: 2017-09-24

## 2017-09-24 RX ORDER — AMIODARONE HYDROCHLORIDE 400 MG/1
0.5 TABLET ORAL
Qty: 900 | Refills: 0 | Status: DISCONTINUED | OUTPATIENT
Start: 2017-09-24 | End: 2017-09-26

## 2017-09-24 RX ORDER — HEPARIN SODIUM 5000 [USP'U]/ML
500 INJECTION INTRAVENOUS; SUBCUTANEOUS
Qty: 25000 | Refills: 0 | Status: DISCONTINUED | OUTPATIENT
Start: 2017-09-24 | End: 2017-09-25

## 2017-09-24 RX ADMIN — Medication 5 MILLIGRAM(S): at 22:00

## 2017-09-24 RX ADMIN — Medication 81 MILLIGRAM(S): at 10:49

## 2017-09-24 RX ADMIN — Medication 5 MILLIGRAM(S): at 10:48

## 2017-09-24 RX ADMIN — Medication 2: at 18:40

## 2017-09-24 RX ADMIN — Medication 1: at 06:32

## 2017-09-24 RX ADMIN — Medication: at 01:00

## 2017-09-24 RX ADMIN — MEROPENEM 200 MILLIGRAM(S): 1 INJECTION INTRAVENOUS at 19:54

## 2017-09-24 RX ADMIN — HEPARIN SODIUM 5 UNIT(S)/HR: 5000 INJECTION INTRAVENOUS; SUBCUTANEOUS at 03:42

## 2017-09-24 RX ADMIN — CHLORHEXIDINE GLUCONATE 15 MILLILITER(S): 213 SOLUTION TOPICAL at 18:39

## 2017-09-24 RX ADMIN — Medication 2: at 12:36

## 2017-09-24 RX ADMIN — ATORVASTATIN CALCIUM 40 MILLIGRAM(S): 80 TABLET, FILM COATED ORAL at 22:00

## 2017-09-24 RX ADMIN — CHLORHEXIDINE GLUCONATE 15 MILLILITER(S): 213 SOLUTION TOPICAL at 05:28

## 2017-09-24 RX ADMIN — CLOPIDOGREL BISULFATE 75 MILLIGRAM(S): 75 TABLET, FILM COATED ORAL at 10:49

## 2017-09-24 RX ADMIN — HEPARIN SODIUM 5 UNIT(S)/HR: 5000 INJECTION INTRAVENOUS; SUBCUTANEOUS at 20:17

## 2017-09-24 RX ADMIN — TAMSULOSIN HYDROCHLORIDE 0.4 MILLIGRAM(S): 0.4 CAPSULE ORAL at 22:00

## 2017-09-24 RX ADMIN — PANTOPRAZOLE SODIUM 40 MILLIGRAM(S): 20 TABLET, DELAYED RELEASE ORAL at 10:49

## 2017-09-24 RX ADMIN — Medication 1000 MILLIGRAM(S): at 18:08

## 2017-09-24 RX ADMIN — Medication 400 MILLIGRAM(S): at 17:42

## 2017-09-24 NOTE — PROGRESS NOTE ADULT - ASSESSMENT
71 yo M w extensive CAD, T2DM, ESRD new HD, prior CVA who was transferred from OSH for NSTEMI s/p impella assisted LHC 1wk ago being treated for pyelonephritis with course c/b obstructive nephropathy requiring HD transferred from floor to CCU after cardiac arrest, vtach and subsequent ROSC.    #Neuro       Intubated, Sedation d/c.      - Possible hypoperfusion/ischemia in brain              F.u CT Head.    #CVS     - S/p Cardiac arrest/vtach               Afib with RVR: Continue amiodarone.               C/w amio gtt. Arrythmia likely triggered by electrolyte abnormalities in the setting of pt refusing HD for few days.                Given the recent NSTEMI, complicated cath, ischemia/cath complication incl. instent thrombosis a possibility. Cardiac enzymes  elevated, possible s/p code.     - c/w aspirin, plavix, lipitor.        #Pulm:      - Acute hypoxic respiratory failure in the setting of cardiac arrest              Will try CPAP today and plan to extubate if successful.        #GI:        NG tube in place, pt NPO except for medication.     #Renal:      - Pyelo with LIN progressing to ESRD requiring HD:  on Vanc and Zosyn since 9/22. Cultures pending. OSH contacted for culture data. Acute leukocytosis more likely 2/2 cardiac arrest related systemic inflammatory response.                 HD per renal        #ID:     On Vanc and Zosyn since 9/22. Cultures pending. OSH contacted for culture data.  Hx of Pyelonephritis.    #Endo        Transitioned to insulin SS.    #Heme: Inappropriate response to 1.5 units of pRBC, will f/u read of CTAP for source of possible bleeding. 71 yo M w extensive CAD, T2DM, ESRD new HD, prior CVA who was transferred from OSH for NSTEMI s/p impella assisted LHC 1wk ago being treated for pyelonephritis with course c/b obstructive nephropathy requiring HD transferred from floor to CCU after cardiac arrest, vtach and subsequent ROSC.    #Neuro       Intubated, Sedation d/c. MS improving. CT head on 9/23 showed new embolic stroke. Heparin gtt started last night      - heparin gtt on hold over concern for bleed, pending neurology attending input, will f/u recs.       - CPAP trial today for possible extubation           #CVS     - S/p Cardiac arrest/vtach               Afib with RVR: Continue amiodarone.               C/w amio gtt. Arrythmia likely triggered by electrolyte abnormalities in the setting of pt refusing HD for few days.                Given the recent NSTEMI, complicated cath, ischemia/cath complication incl. instent thrombosis a possibility. Cardiac       - c/w aspirin, plavix, lipitor.        #Pulm:      - Acute hypoxic respiratory failure in the setting of cardiac arrest              Will try CPAP today and plan to extubate if successful.        #GI:        NG tube in place, pt NPO except for medication. If pt fails CPAP trial, will start tube feeds.    #Renal:      - Pyelo with LIN progressing to ESRD requiring HD:  on Vanc and Zosyn since 9/22. Cultures ngtd. OSH contacted for culture data. Acute leukocytosis more likely 2/2 cardiac arrest related systemic inflammatory response.                 HD per renal        #ID:     On Vanc since 9/22, meropenem since 9/23. Cultures ngtd. OSH contacted for culture data.  Hx of Pyelonephritis.    #Endo        Transitioned to insulin SS.    #Heme: Inappropriate response to 1.5 units of pRBC, CT a/p showed no RP bleed.  	- check FOBT                  - maintain active T&S

## 2017-09-24 NOTE — PROGRESS NOTE ADULT - ASSESSMENT
HD dependent LIN due to an obstructed solitary functioning Right kidney and probable ischemic injury.   S/p arrest on 9/21/17, s/p NSTEMI -- S/p impella assisted multivessel BABAR X5.  S/p CVA on Heparin drip/ Anemia receiving pRBC.  On antibiotcs for ? PNA  Awaiting urological intervention to decompress obstructed solitary kidney pending Cardiac and overall stabilization.  For dialysis tomorrow via temporary access, fluid removal as tolerable.   Please monitor Vancomycin trough levels.

## 2017-09-24 NOTE — CHART NOTE - NSCHARTNOTEFT_GEN_A_CORE
====================  CCU MIDNIGHT ROUNDS  ====================    JIMI BUCHANAN  [id number]    ====================  SUMMARY:  ====================    73 yo M w extensive CAD, T2DM, ESRD new HD, prior CVA who was transferred from OSH for NSTEMI s/p impella assisted LHC 1wk ago being treated for pyelonephritis with course c/b obstructive nephropathy requiring HD transferred from floor to CCU after cardiac arrest, vtach and subsequent ROSC. c/b a fib RVR, new acute embolic stroke w/ R sided hemiparesis     ====================  NEW EVENTS:  ====================    s/p 1 un PRBC w/ appropriate response, no s/s bleeding. CTAP neg RP bleed.     ====================  VITALS (Last 12 hrs):  ====================    T(C): 37.2 (09-24-17 @ 21:00), Max: 37.5 (09-24-17 @ 14:00)  HR: 86 (09-24-17 @ 22:00) (83 - 102)  ABP: 150/70 (09-24-17 @ 22:00) (134/54 - 176/74)  ABP(mean): 100 (09-24-17 @ 22:00) (80 - 110)  RR: 22 (09-24-17 @ 22:00) (17 - 25)  SpO2: 96% (09-24-17 @ 22:00) (96% - 100%)  CVP(mm Hg): 13 (09-24-17 @ 22:00) (5 - 13)    TELEMETRY: sinus     Mode: AC/ CMV (Assist Control/ Continuous Mandatory Ventilation)  RR (machine): 14  TV (machine): 500  FiO2: 40  PEEP: 5  ITime: 1  MAP: 7  PIP: 13    I&O's Summary    23 Sep 2017 07:01  -  24 Sep 2017 07:00  --------------------------------------------------------  IN: 810.8 mL / OUT: 33 mL / NET: 777.8 mL    24 Sep 2017 07:01  -  24 Sep 2017 23:18  --------------------------------------------------------  IN: 905.5 mL / OUT: 36 mL / NET: 869.5 mL    ====================  PLAN:  ====================  c/w ASA, plavix, lipitor for CAD   w/ acute stroke - c/w ASA, lipitor, eventual PT/OT, MRI   c/w amio and heparin for A fib - currently in sinus - on patient specific s/t recent acute stroke   c/w broad spec ABX, f/u cultures, vanco by level   cpap trial in AM    Jenelle Tejada CCU NP   #[id number] ====================  CCU MIDNIGHT ROUNDS  ====================    JIMI BUCHANAN  [id number]    ====================  SUMMARY:  ====================    73 yo M w extensive CAD, T2DM, ESRD new HD, prior CVA who was transferred from OSH for NSTEMI s/p impella assisted LHC 1wk ago being treated for pyelonephritis with course c/b obstructive nephropathy requiring HD transferred from floor to CCU after cardiac arrest, vtach and subsequent ROSC. c/b a fib RVR, new acute embolic stroke w/ R sided hemiparesis     ====================  NEW EVENTS:  ====================    s/p 1 un PRBC w/ appropriate response, no s/s bleeding. CTAP neg RP bleed.     ====================  VITALS (Last 12 hrs):  ====================    T(C): 37.2 (09-24-17 @ 21:00), Max: 37.5 (09-24-17 @ 14:00)  HR: 86 (09-24-17 @ 22:00) (83 - 102)  ABP: 150/70 (09-24-17 @ 22:00) (134/54 - 176/74)  ABP(mean): 100 (09-24-17 @ 22:00) (80 - 110)  RR: 22 (09-24-17 @ 22:00) (17 - 25)  SpO2: 96% (09-24-17 @ 22:00) (96% - 100%)  CVP(mm Hg): 13 (09-24-17 @ 22:00) (5 - 13)    TELEMETRY: sinus     Mode: AC/ CMV (Assist Control/ Continuous Mandatory Ventilation)  RR (machine): 14  TV (machine): 500  FiO2: 40  PEEP: 5  ITime: 1  MAP: 7  PIP: 13    I&O's Summary    23 Sep 2017 07:01  -  24 Sep 2017 07:00  --------------------------------------------------------  IN: 810.8 mL / OUT: 33 mL / NET: 777.8 mL    24 Sep 2017 07:01  -  24 Sep 2017 23:18  --------------------------------------------------------  IN: 905.5 mL / OUT: 36 mL / NET: 869.5 mL    ====================  PLAN:  ====================  c/w ASA, plavix, lipitor for CAD s/p BABAR x 3 LAD, BABAR x 2 RCA   w/ acute stroke - c/w ASA, lipitor, eventual PT/OT, MRI   c/w amio and heparin for A fib - currently in sinus - on patient specific s/t recent acute stroke   c/w broad spec ABX, f/u cultures, vanco by level   cpap trial in AM  HD per renal   trend H&H, transfuse PRN - no s/s active bleeding    Jenelle Tejada CCU NP   #[id number] ====================  CCU MIDNIGHT ROUNDS  ====================    JIMI BUCHANAN  [id number]    ====================  SUMMARY:  ====================    73 yo M w extensive CAD, T2DM, ESRD new HD, prior CVA who was transferred from OSH for NSTEMI s/p impella assisted LHC 1wk ago being treated for pyelonephritis with course c/b obstructive nephropathy requiring HD transferred from floor to CCU after cardiac arrest, vtach and subsequent ROSC. c/b a fib RVR, new acute embolic stroke w/ R sided hemiparesis     ====================  NEW EVENTS:  ====================    s/p 1 un PRBC w/ appropriate response, no s/s bleeding. CTAP neg RP bleed.     ====================  VITALS (Last 12 hrs):  ====================    T(C): 37.2 (09-24-17 @ 21:00), Max: 37.5 (09-24-17 @ 14:00)  HR: 86 (09-24-17 @ 22:00) (83 - 102)  ABP: 150/70 (09-24-17 @ 22:00) (134/54 - 176/74)  ABP(mean): 100 (09-24-17 @ 22:00) (80 - 110)  RR: 22 (09-24-17 @ 22:00) (17 - 25)  SpO2: 96% (09-24-17 @ 22:00) (96% - 100%)  CVP(mm Hg): 13 (09-24-17 @ 22:00) (5 - 13)    TELEMETRY: sinus     Mode: AC/ CMV (Assist Control/ Continuous Mandatory Ventilation)  RR (machine): 14  TV (machine): 500  FiO2: 40  PEEP: 5  ITime: 1  MAP: 7  PIP: 13    I&O's Summary    23 Sep 2017 07:01  -  24 Sep 2017 07:00  --------------------------------------------------------  IN: 810.8 mL / OUT: 33 mL / NET: 777.8 mL    24 Sep 2017 07:01  -  24 Sep 2017 23:18  --------------------------------------------------------  IN: 905.5 mL / OUT: 36 mL / NET: 869.5 mL    ====================  PLAN:  ====================  c/w ASA, plavix, lipitor for CAD s/p BABAR x 3 LAD, BABAR x 2 RCA   w/ acute stroke - c/w ASA, lipitor, eventual PT/OT, MRI   c/w amio and heparin for A fib - currently in sinus - on patient specific s/t recent acute stroke   c/w broad spec ABX, f/u cultures, vanco by level   cpap trial in AM  HD per renal   trend H&H, transfuse PRN - no s/s active bleeding    Jenelle Tejada CCU NP   #[id number]    Fellow Addendum:   72M w/CAD, T2DM, ESRD new HD, prior CVA who was transferred from OSH for NSTEMI s/p impella assisted LHC managed for obstructive nephropathy and pyelonephritis course c/b cardiac arrest, afib w/RVR, and acute embolic stroke w/ R sided hemiparesis; remains intubated, not on any sedation, following commands more reliably today.   - Tolerated cpap trial approx 3 1/2 hrs with slightly decreased tidal volumes (330-360) compared to 9/23 trial  - Started enteral feeds  - Hb appropriately responded to 1 unit PRBC   - Neuro on board with heparin gtt

## 2017-09-24 NOTE — PROGRESS NOTE ADULT - ATTENDING COMMENTS
Patient is seen and examined with fellow, NP and the CCU house-staff. I agree with the history, physical and the assessment and plan.  on amiodarone for afib   hep gtt n hold until further neuro eval  CPAP trial  on DAPT  more responsive today

## 2017-09-24 NOTE — PROGRESS NOTE ADULT - SUBJECTIVE AND OBJECTIVE BOX
Patient seen in follow up for dialysis dependent LIN. Events noted, remains in CCU off pressors, on Heparin drip for new CVA, receiving pRBC, comfortable, responsive, vented, follows commands. Scant U.O. 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 Cardiac status preventing urologic intervention.     PMH:  CVA (cerebral vascular accident)  COPD (chronic obstructive pulmonary disease)  BPH (benign prostatic hyperplasia)  Bipolar affective disorder  CKD (chronic kidney disease)  Pancreatitis  Hypertension  Dyslipidemia  Diabetes mellitus  Coronary artery disease  Cardiomyopathy      MEDICATIONS  (STANDING):  tamsulosin 0.4 milliGRAM(s) Oral at bedtime  clopidogrel Tablet 75 milliGRAM(s) Oral daily  busPIRone 5 milliGRAM(s) Oral two times a day  pantoprazole  Injectable 40 milliGRAM(s) IV Push daily  atorvastatin 40 milliGRAM(s) Oral at bedtime  aspirin  chewable 81 milliGRAM(s) Oral daily  insulin lispro (HumaLOG) corrective regimen sliding scale   SubCutaneous every 6 hours  meropenem IVPB 500 milliGRAM(s) IV Intermittent every 24 hours  chlorhexidine 0.12% Liquid 15 milliLiter(s) Swish and Spit two times a day  amiodarone Infusion 0.5 mG/Min (16.667 mL/Hr) IV Continuous <Continuous>    MEDICATIONS  (PRN):    T(C): 37 (09-24-17 @ 15:00), Max: 38.3 (09-23-17 @ 08:00)  HR: 102 (09-24-17 @ 16:00) (76 - 114)  BP: --  RR: 25 (09-24-17 @ 16:00) (14 - 36)  SpO2: 100% (09-24-17 @ 16:00) (95% - 100%)  Wt(kg): --  I&O's Detail    23 Sep 2017 07:01  -  24 Sep 2017 07:00  --------------------------------------------------------  IN:    amiodarone Infusion: 167 mL    amiodarone Infusion: 16.7 mL    amiodarone Infusion: 217.1 mL    Enteral Tube Flush: 240 mL    heparin Infusion: 20 mL    IV PiggyBack: 150 mL  Total IN: 810.8 mL    OUT:    Indwelling Catheter - Urethral: 33 mL  Total OUT: 33 mL    Total NET: 777.8 mL      24 Sep 2017 07:01  -  24 Sep 2017 16:42  --------------------------------------------------------  IN:    amiodarone Infusion: 150.3 mL    Enteral Tube Flush: 60 mL    heparin Infusion: 10 mL    Packed Red Blood Cells: 200 mL  Total IN: 420.3 mL    OUT:    Indwelling Catheter - Urethral: 6 mL  Total OUT: 6 mL    Total NET: 414.3 mL          PHYSICAL EXAM:  General: NAD, right side is weak, vented. Right radial A. Line, Right I.J. HD catheter, left Triple lumen, Landa  Respiratory: b/l air entry, clear anteriorly  Cardiovascular: S1 S2 reg  Gastrointestinal: soft, NT, BS present  Extremities:  no edema  No rash    CBC Full  -  ( 24 Sep 2017 13:47 )  WBC Count : 21.8 K/uL  Hemoglobin : 7.6 g/dL  Hematocrit : 22.1 %  Platelet Count - Automated : 143 K/uL  Mean Cell Volume : 88.4 fl  Mean Cell Hemoglobin : 30.3 pg  Mean Cell Hemoglobin Concentration : 34.3 gm/dL  Auto Neutrophil # : 19.4 K/uL  Auto Lymphocyte # : 1.1 K/uL  Auto Monocyte # : 1.3 K/uL  Auto Eosinophil # : 0.0 K/uL  Auto Basophil # : 0.0 K/uL  Auto Neutrophil % : 88.9 %  Auto Lymphocyte % : 5.0 %  Auto Monocyte % : 5.9 %  Auto Eosinophil % : 0.0 %  Auto Basophil % : 0.1 %    09-24    134<L>  |  92<L>  |  38<H>  ----------------------------<  195<H>  4.2   |  24  |  4.66<H>    Ca    8.5      24 Sep 2017 05:55  Phos  4.5     09-24  Mg     2.1     09-24    TPro  6.7  /  Alb  3.0<L>  /  TBili  0.7  /  DBili  x   /  AST  158<H>  /  ALT  67<H>  /  AlkPhos  83  09-24    ABG - ( 24 Sep 2017 05:40 )  pH: 7.50  /  pCO2: 32    /  pO2: 141   / HCO3: 24    / Base Excess: 1.4   /  SaO2: 100                 Sodium, Serum: 134 (09-24 @ 05:55)  Sodium, Serum: 134 (09-24 @ 00:28)  Sodium, Serum: 134 (09-23 @ 05:01)  Sodium, Serum: 134 (09-22 @ 21:57)  Sodium, Serum: 134 (09-22 @ 14:40)  Sodium, Serum: 135 (09-22 @ 08:18)  Sodium, Serum: 135 (09-22 @ 04:17)  Sodium, Serum: 136 (09-22 @ 00:31)  Sodium, Serum: 136 (09-21 @ 23:04)    Creatinine, Serum: 4.66 (09-24 @ 05:55)  Creatinine, Serum: 4.17 (09-24 @ 00:28)  Creatinine, Serum: 6.13 (09-23 @ 05:01)  Creatinine, Serum: 5.70 (09-22 @ 21:57)  Creatinine, Serum: 4.86 (09-22 @ 14:40)  Creatinine, Serum: 4.61 (09-22 @ 08:18)  Creatinine, Serum: 3.84 (09-22 @ 04:17)  Creatinine, Serum: 3.28 (09-22 @ 00:31)  Creatinine, Serum: 3.10 (09-21 @ 23:04)    Potassium, Serum: 4.2 (09-24 @ 05:55)  Potassium, Serum: 4.1 (09-24 @ 00:28)  Potassium, Serum: 4.3 (09-23 @ 05:01)  Potassium, Serum: 4.3 (09-22 @ 21:57)  Potassium, Serum: 4.8 (09-22 @ 14:40)  Potassium, Serum: 4.7 (09-22 @ 08:18)  Potassium, Serum: 4.6 (09-22 @ 04:17)  Potassium, Serum: 4.0 (09-22 @ 00:31)  Potassium, Serum: 3.6 (09-21 @ 23:04)    Hemoglobin: 7.6 (09-24 @ 13:47)  Hemoglobin: 8.2 (09-24 @ 05:57)  Hemoglobin: 8.1 (09-24 @ 00:28)  Hemoglobin: 7.9 (09-23 @ 05:01)  Hemoglobin: 8.4 (09-22 @ 21:57)  Hemoglobin: 7.4 (09-22 @ 14:40)  Hemoglobin: 7.3 (09-22 @ 08:18)  Hemoglobin: 8.5 (09-22 @ 04:17)  Hemoglobin: 8.4 (09-22 @ 00:31)  Hemoglobin: 8.9 (09-21 @ 23:04)

## 2017-09-24 NOTE — PROGRESS NOTE ADULT - SUBJECTIVE AND OBJECTIVE BOX
JIMI BUCHANAN  72y  Male      Patient is a 72y old  Male who presents with a chief complaint of s/p cardiac cath (22 Sep 2017 12:34)      INTERVAL HPI/OVERNIGHT EVENTS:    T(C): 36.9 (09-24-17 @ 06:00), Max: 38.3 (09-23-17 @ 08:00)  HR: 90 (09-24-17 @ 07:00) (76 - 114)  BP: --  RR: 16 (09-24-17 @ 07:00) (14 - 36)  SpO2: 100% (09-24-17 @ 07:00) (95% - 100%)  Wt(kg): --Vital Signs Last 24 Hrs  T(C): 36.9 (24 Sep 2017 06:00), Max: 38.3 (23 Sep 2017 08:00)  T(F): 98.5 (24 Sep 2017 06:00), Max: 101 (23 Sep 2017 08:00)  HR: 90 (24 Sep 2017 07:00) (76 - 114)  BP: --  BP(mean): --  RR: 16 (24 Sep 2017 07:00) (14 - 36)  SpO2: 100% (24 Sep 2017 07:00) (95% - 100%)  Wt(kg): --    PHYSICAL EXAM:  GENERAL: NAD, well-groomed, well-developed  HEAD:  Atraumatic, Normocephalic  EYES: EOMI, PERRLA, conjunctiva and sclera clear  ENMT: No tonsillar erythema, exudates,; Moist mucous membranes. No lesions  NECK: Supple, No JVD, Normal thyroid  CHEST/LUNG: Clear to percussion bilaterally; No rales, rhonchi, wheezing, or rubs  HEART: Regular rate and rhythm; No murmurs, rubs, or gallops  ABDOMEN: Soft, Nontender, Nondistended; Bowel sounds present.  No hepatosplenomegaly  EXTREMITIES:  2+ Peripheral Pulses, No clubbing, cyanosis, or edema  LYMPH: No lymphadenopathy noted  SKIN: No rashes or lesions  PSYCH: Alert & Oriented x3  NERVOUS SYSTEM:  ; Motor Strength 5/5 B/L upper and lower extremities.  Sensory intact    Consultant(s) Notes Reviewed:  [x ] YES  [ ] NO  Care Discussed with Consultants/Other Providers [ x] YES  [ ] NO    LABS:                        8.2    24.9  )-----------( 171      ( 24 Sep 2017 05:57 )             23.9     09-24    134<L>  |  92<L>  |  38<H>  ----------------------------<  195<H>  4.2   |  24  |  4.66<H>    Ca    8.5      24 Sep 2017 05:55  Phos  4.5     09-24  Mg     2.1     09-24    TPro  6.7  /  Alb  3.0<L>  /  TBili  0.7  /  DBili  x   /  AST  158<H>  /  ALT  67<H>  /  AlkPhos  83  09-24    PTT - ( 23 Sep 2017 05:01 )  PTT:37.9 sec    CAPILLARY BLOOD GLUCOSE  190 (24 Sep 2017 06:00)  142 (23 Sep 2017 17:54)  180 (23 Sep 2017 12:15)          ABG - ( 24 Sep 2017 05:40 )  pH: 7.50  /  pCO2: 32    /  pO2: 141   / HCO3: 24    / Base Excess: 1.4   /  SaO2: 100                   RADIOLOGY & ADDITIONAL TESTS:    Imaging Personally Reviewed:  [ ] YES  [ ] NO JIMI BUCHANAN  72y  Male      Patient is a 72y old  Male who presents with a chief complaint of s/p cardiac cath (22 Sep 2017 12:34)      INTERVAL HPI/OVERNIGHT EVENTS: Overnight pt was placed on heparin gtt with goal aPTT of 50-60 for embolic stroke. Pt currently more alert this AM, able to follow commands, responds to voice.     T(C): 36.9 (09-24-17 @ 06:00), Max: 38.3 (09-23-17 @ 08:00)  HR: 90 (09-24-17 @ 07:00) (76 - 114)  BP: --  RR: 16 (09-24-17 @ 07:00) (14 - 36)  SpO2: 100% (09-24-17 @ 07:00) (95% - 100%)  Wt(kg): --Vital Signs Last 24 Hrs  T(C): 36.9 (24 Sep 2017 06:00), Max: 38.3 (23 Sep 2017 08:00)  T(F): 98.5 (24 Sep 2017 06:00), Max: 101 (23 Sep 2017 08:00)  HR: 90 (24 Sep 2017 07:00) (76 - 114)  BP: --  BP(mean): --  RR: 16 (24 Sep 2017 07:00) (14 - 36)  SpO2: 100% (24 Sep 2017 07:00) (95% - 100%)  Wt(kg): --    PHYSICAL EXAM:  GENERAL: Off sedation on ventilator  HEAD:  Atraumatic, Normocephalic  EYES: EOMI, PERRLA, conjunctiva and sclera clear  NECK: ET tube in place, Supple, No JVD, Normal thyroid  CHEST/LUNG: Clear to percussion bilaterally; No rales, rhonchi, wheezing, or rubs  HEART: Regular rate and rhythm; No murmurs, rubs, or gallops  ABDOMEN: Soft, Nontender, Nondistended; Bowel sounds present.   EXTREMITIES:  2+ Peripheral Pulses, No clubbing, cyanosis, or edema  LYMPH: No lymphadenopathy noted  SKIN: No rashes or lesions  NERVOUS SYSTEM:  ; Intubated. Opening eyes spontaneously. Moves L upper and lower extremities on command, unable to move on right.      Consultant(s) Notes Reviewed:  [x ] YES  [ ] NO  Care Discussed with Consultants/Other Providers [ x] YES  [ ] NO    LABS:                        8.2    24.9  )-----------( 171      ( 24 Sep 2017 05:57 )             23.9     09-24    134<L>  |  92<L>  |  38<H>  ----------------------------<  195<H>  4.2   |  24  |  4.66<H>    Ca    8.5      24 Sep 2017 05:55  Phos  4.5     09-24  Mg     2.1     09-24    TPro  6.7  /  Alb  3.0<L>  /  TBili  0.7  /  DBili  x   /  AST  158<H>  /  ALT  67<H>  /  AlkPhos  83  09-24    PTT - ( 23 Sep 2017 05:01 )  PTT:37.9 sec    CAPILLARY BLOOD GLUCOSE  190 (24 Sep 2017 06:00)  142 (23 Sep 2017 17:54)  180 (23 Sep 2017 12:15)          ABG - ( 24 Sep 2017 05:40 )  pH: 7.50  /  pCO2: 32    /  pO2: 141   / HCO3: 24    / Base Excess: 1.4   /  SaO2: 100                   RADIOLOGY & ADDITIONAL TESTS:    Imaging Personally Reviewed:  [ x ] YES  [ ] NO

## 2017-09-25 LAB
ALBUMIN SERPL ELPH-MCNC: 2.5 G/DL — LOW (ref 3.3–5)
ALP SERPL-CCNC: 114 U/L — SIGNIFICANT CHANGE UP (ref 40–120)
ALT FLD-CCNC: 54 U/L RC — HIGH (ref 10–45)
ANION GAP SERPL CALC-SCNC: 19 MMOL/L — HIGH (ref 5–17)
ANION GAP SERPL CALC-SCNC: 19 MMOL/L — HIGH (ref 5–17)
APTT BLD: 43.8 SEC — HIGH (ref 27.5–37.4)
APTT BLD: 47.1 SEC — HIGH (ref 27.5–37.4)
APTT BLD: 47.9 SEC — HIGH (ref 27.5–37.4)
APTT BLD: 69 SEC — HIGH (ref 27.5–37.4)
AST SERPL-CCNC: 77 U/L — HIGH (ref 10–40)
BASE EXCESS BLDA CALC-SCNC: -1.5 MMOL/L — SIGNIFICANT CHANGE UP (ref -2–2)
BASE EXCESS BLDA CALC-SCNC: -2 MMOL/L — SIGNIFICANT CHANGE UP (ref -2–2)
BASE EXCESS BLDA CALC-SCNC: 1.3 MMOL/L — SIGNIFICANT CHANGE UP (ref -2–2)
BASOPHILS # BLD AUTO: 0 K/UL — SIGNIFICANT CHANGE UP (ref 0–0.2)
BASOPHILS # BLD AUTO: 0 K/UL — SIGNIFICANT CHANGE UP (ref 0–0.2)
BASOPHILS NFR BLD AUTO: 0.1 % — SIGNIFICANT CHANGE UP (ref 0–2)
BASOPHILS NFR BLD AUTO: 0.1 % — SIGNIFICANT CHANGE UP (ref 0–2)
BILIRUB SERPL-MCNC: 0.7 MG/DL — SIGNIFICANT CHANGE UP (ref 0.2–1.2)
BUN SERPL-MCNC: 36 MG/DL — HIGH (ref 7–23)
BUN SERPL-MCNC: 60 MG/DL — HIGH (ref 7–23)
CALCIUM SERPL-MCNC: 8.2 MG/DL — LOW (ref 8.4–10.5)
CALCIUM SERPL-MCNC: 8.3 MG/DL — LOW (ref 8.4–10.5)
CHLORIDE SERPL-SCNC: 92 MMOL/L — LOW (ref 96–108)
CHLORIDE SERPL-SCNC: 95 MMOL/L — LOW (ref 96–108)
CO2 BLDA-SCNC: 22 MMOL/L — SIGNIFICANT CHANGE UP (ref 22–30)
CO2 BLDA-SCNC: 23 MMOL/L — SIGNIFICANT CHANGE UP (ref 22–30)
CO2 BLDA-SCNC: 25 MMOL/L — SIGNIFICANT CHANGE UP (ref 22–30)
CO2 SERPL-SCNC: 21 MMOL/L — LOW (ref 22–31)
CO2 SERPL-SCNC: 21 MMOL/L — LOW (ref 22–31)
CREAT SERPL-MCNC: 4.71 MG/DL — HIGH (ref 0.5–1.3)
CREAT SERPL-MCNC: 6.95 MG/DL — HIGH (ref 0.5–1.3)
EOSINOPHIL # BLD AUTO: 0.1 K/UL — SIGNIFICANT CHANGE UP (ref 0–0.5)
EOSINOPHIL # BLD AUTO: 0.1 K/UL — SIGNIFICANT CHANGE UP (ref 0–0.5)
EOSINOPHIL NFR BLD AUTO: 0.3 % — SIGNIFICANT CHANGE UP (ref 0–6)
EOSINOPHIL NFR BLD AUTO: 0.6 % — SIGNIFICANT CHANGE UP (ref 0–6)
GAS PNL BLDA: SIGNIFICANT CHANGE UP
GLUCOSE SERPL-MCNC: 163 MG/DL — HIGH (ref 70–99)
GLUCOSE SERPL-MCNC: 197 MG/DL — HIGH (ref 70–99)
HCO3 BLDA-SCNC: 22 MMOL/L — SIGNIFICANT CHANGE UP (ref 21–29)
HCO3 BLDA-SCNC: 22 MMOL/L — SIGNIFICANT CHANGE UP (ref 21–29)
HCO3 BLDA-SCNC: 24 MMOL/L — SIGNIFICANT CHANGE UP (ref 21–29)
HCT VFR BLD CALC: 23.9 % — LOW (ref 39–50)
HCT VFR BLD CALC: 24 % — LOW (ref 39–50)
HCT VFR BLD CALC: 24.6 % — LOW (ref 39–50)
HCT VFR BLD CALC: 24.9 % — LOW (ref 39–50)
HGB BLD-MCNC: 8.2 G/DL — LOW (ref 13–17)
HGB BLD-MCNC: 8.4 G/DL — LOW (ref 13–17)
HOROWITZ INDEX BLDA+IHG-RTO: 40 — SIGNIFICANT CHANGE UP
LYMPHOCYTES # BLD AUTO: 1.1 K/UL — SIGNIFICANT CHANGE UP (ref 1–3.3)
LYMPHOCYTES # BLD AUTO: 1.1 K/UL — SIGNIFICANT CHANGE UP (ref 1–3.3)
LYMPHOCYTES # BLD AUTO: 6.2 % — LOW (ref 13–44)
LYMPHOCYTES # BLD AUTO: 6.2 % — LOW (ref 13–44)
MAGNESIUM SERPL-MCNC: 2.3 MG/DL — SIGNIFICANT CHANGE UP (ref 1.6–2.6)
MCHC RBC-ENTMCNC: 30.3 PG — SIGNIFICANT CHANGE UP (ref 27–34)
MCHC RBC-ENTMCNC: 30.5 PG — SIGNIFICANT CHANGE UP (ref 27–34)
MCHC RBC-ENTMCNC: 30.7 PG — SIGNIFICANT CHANGE UP (ref 27–34)
MCHC RBC-ENTMCNC: 31.1 PG — SIGNIFICANT CHANGE UP (ref 27–34)
MCHC RBC-ENTMCNC: 33.9 GM/DL — SIGNIFICANT CHANGE UP (ref 32–36)
MCHC RBC-ENTMCNC: 34.3 GM/DL — SIGNIFICANT CHANGE UP (ref 32–36)
MCHC RBC-ENTMCNC: 34.4 GM/DL — SIGNIFICANT CHANGE UP (ref 32–36)
MCHC RBC-ENTMCNC: 35 GM/DL — SIGNIFICANT CHANGE UP (ref 32–36)
MCV RBC AUTO: 88.9 FL — SIGNIFICANT CHANGE UP (ref 80–100)
MCV RBC AUTO: 88.9 FL — SIGNIFICANT CHANGE UP (ref 80–100)
MCV RBC AUTO: 89.3 FL — SIGNIFICANT CHANGE UP (ref 80–100)
MCV RBC AUTO: 89.6 FL — SIGNIFICANT CHANGE UP (ref 80–100)
MONOCYTES # BLD AUTO: 0.8 K/UL — SIGNIFICANT CHANGE UP (ref 0–0.9)
MONOCYTES # BLD AUTO: 1 K/UL — HIGH (ref 0–0.9)
MONOCYTES NFR BLD AUTO: 4.5 % — SIGNIFICANT CHANGE UP (ref 2–14)
MONOCYTES NFR BLD AUTO: 5.2 % — SIGNIFICANT CHANGE UP (ref 2–14)
NEUTROPHILS # BLD AUTO: 15.9 K/UL — HIGH (ref 1.8–7.4)
NEUTROPHILS # BLD AUTO: 16.2 K/UL — HIGH (ref 1.8–7.4)
NEUTROPHILS NFR BLD AUTO: 88.1 % — HIGH (ref 43–77)
NEUTROPHILS NFR BLD AUTO: 88.6 % — HIGH (ref 43–77)
PCO2 BLDA: 33 MMHG — SIGNIFICANT CHANGE UP (ref 32–46)
PCO2 BLDA: 33 MMHG — SIGNIFICANT CHANGE UP (ref 32–46)
PCO2 BLDA: 34 MMHG — SIGNIFICANT CHANGE UP (ref 32–46)
PH BLDA: 7.42 — SIGNIFICANT CHANGE UP (ref 7.35–7.45)
PH BLDA: 7.44 — SIGNIFICANT CHANGE UP (ref 7.35–7.45)
PH BLDA: 7.48 — HIGH (ref 7.35–7.45)
PHOSPHATE SERPL-MCNC: 6.1 MG/DL — HIGH (ref 2.5–4.5)
PLATELET # BLD AUTO: 144 K/UL — LOW (ref 150–400)
PLATELET # BLD AUTO: 146 K/UL — LOW (ref 150–400)
PLATELET # BLD AUTO: 155 K/UL — SIGNIFICANT CHANGE UP (ref 150–400)
PLATELET # BLD AUTO: 156 K/UL — SIGNIFICANT CHANGE UP (ref 150–400)
PO2 BLDA: 127 MMHG — HIGH (ref 74–108)
PO2 BLDA: 128 MMHG — HIGH (ref 74–108)
PO2 BLDA: 130 MMHG — HIGH (ref 74–108)
POTASSIUM SERPL-MCNC: 3.5 MMOL/L — SIGNIFICANT CHANGE UP (ref 3.5–5.3)
POTASSIUM SERPL-MCNC: 3.9 MMOL/L — SIGNIFICANT CHANGE UP (ref 3.5–5.3)
POTASSIUM SERPL-SCNC: 3.5 MMOL/L — SIGNIFICANT CHANGE UP (ref 3.5–5.3)
POTASSIUM SERPL-SCNC: 3.9 MMOL/L — SIGNIFICANT CHANGE UP (ref 3.5–5.3)
PROT SERPL-MCNC: 6.4 G/DL — SIGNIFICANT CHANGE UP (ref 6–8.3)
RBC # BLD: 2.69 M/UL — LOW (ref 4.2–5.8)
RBC # BLD: 2.7 M/UL — LOW (ref 4.2–5.8)
RBC # BLD: 2.74 M/UL — LOW (ref 4.2–5.8)
RBC # BLD: 2.79 M/UL — LOW (ref 4.2–5.8)
RBC # FLD: 12.8 % — SIGNIFICANT CHANGE UP (ref 10.3–14.5)
RBC # FLD: 12.8 % — SIGNIFICANT CHANGE UP (ref 10.3–14.5)
RBC # FLD: 13 % — SIGNIFICANT CHANGE UP (ref 10.3–14.5)
RBC # FLD: 13.1 % — SIGNIFICANT CHANGE UP (ref 10.3–14.5)
SAO2 % BLDA: 99 % — HIGH (ref 92–96)
SODIUM SERPL-SCNC: 132 MMOL/L — LOW (ref 135–145)
SODIUM SERPL-SCNC: 135 MMOL/L — SIGNIFICANT CHANGE UP (ref 135–145)
VANCOMYCIN TROUGH SERPL-MCNC: 16 UG/ML — SIGNIFICANT CHANGE UP (ref 10–20)
VANCOMYCIN TROUGH SERPL-MCNC: 17.6 UG/ML — SIGNIFICANT CHANGE UP (ref 10–20)
WBC # BLD: 17.9 K/UL — HIGH (ref 3.8–10.5)
WBC # BLD: 18 K/UL — HIGH (ref 3.8–10.5)
WBC # BLD: 18.4 K/UL — HIGH (ref 3.8–10.5)
WBC # BLD: 20 K/UL — HIGH (ref 3.8–10.5)
WBC # FLD AUTO: 17.9 K/UL — HIGH (ref 3.8–10.5)
WBC # FLD AUTO: 18 K/UL — HIGH (ref 3.8–10.5)
WBC # FLD AUTO: 18.4 K/UL — HIGH (ref 3.8–10.5)
WBC # FLD AUTO: 20 K/UL — HIGH (ref 3.8–10.5)

## 2017-09-25 PROCEDURE — 93926 LOWER EXTREMITY STUDY: CPT | Mod: 26,LT

## 2017-09-25 PROCEDURE — 99291 CRITICAL CARE FIRST HOUR: CPT

## 2017-09-25 PROCEDURE — 71010: CPT | Mod: 26

## 2017-09-25 PROCEDURE — 93010 ELECTROCARDIOGRAM REPORT: CPT

## 2017-09-25 RX ORDER — METOPROLOL TARTRATE 50 MG
25 TABLET ORAL
Qty: 0 | Refills: 0 | Status: DISCONTINUED | OUTPATIENT
Start: 2017-09-25 | End: 2017-10-06

## 2017-09-25 RX ORDER — LISINOPRIL 2.5 MG/1
5 TABLET ORAL DAILY
Qty: 0 | Refills: 0 | Status: DISCONTINUED | OUTPATIENT
Start: 2017-09-25 | End: 2017-09-25

## 2017-09-25 RX ORDER — CALCIUM GLUCONATE 100 MG/ML
2 VIAL (ML) INTRAVENOUS EVERY 4 HOURS
Qty: 0 | Refills: 0 | Status: COMPLETED | OUTPATIENT
Start: 2017-09-25 | End: 2017-09-25

## 2017-09-25 RX ORDER — HEPARIN SODIUM 5000 [USP'U]/ML
600 INJECTION INTRAVENOUS; SUBCUTANEOUS
Qty: 25000 | Refills: 0 | Status: DISCONTINUED | OUTPATIENT
Start: 2017-09-25 | End: 2017-09-27

## 2017-09-25 RX ORDER — MAGNESIUM SULFATE 500 MG/ML
2 VIAL (ML) INJECTION ONCE
Qty: 0 | Refills: 0 | Status: COMPLETED | OUTPATIENT
Start: 2017-09-25 | End: 2017-09-25

## 2017-09-25 RX ORDER — POTASSIUM CHLORIDE 20 MEQ
40 PACKET (EA) ORAL ONCE
Qty: 0 | Refills: 0 | Status: COMPLETED | OUTPATIENT
Start: 2017-09-25 | End: 2017-09-25

## 2017-09-25 RX ORDER — METOPROLOL TARTRATE 50 MG
25 TABLET ORAL ONCE
Qty: 0 | Refills: 0 | Status: DISCONTINUED | OUTPATIENT
Start: 2017-09-25 | End: 2017-09-25

## 2017-09-25 RX ORDER — HYDRALAZINE HCL 50 MG
10 TABLET ORAL ONCE
Qty: 0 | Refills: 0 | Status: DISCONTINUED | OUTPATIENT
Start: 2017-09-25 | End: 2017-09-25

## 2017-09-25 RX ORDER — HEPARIN SODIUM 5000 [USP'U]/ML
550 INJECTION INTRAVENOUS; SUBCUTANEOUS
Qty: 25000 | Refills: 0 | Status: DISCONTINUED | OUTPATIENT
Start: 2017-09-25 | End: 2017-09-25

## 2017-09-25 RX ORDER — DEXMEDETOMIDINE HYDROCHLORIDE IN 0.9% SODIUM CHLORIDE 4 UG/ML
0.2 INJECTION INTRAVENOUS
Qty: 200 | Refills: 0 | Status: DISCONTINUED | OUTPATIENT
Start: 2017-09-25 | End: 2017-09-25

## 2017-09-25 RX ORDER — METOPROLOL TARTRATE 50 MG
25 TABLET ORAL ONCE
Qty: 0 | Refills: 0 | Status: COMPLETED | OUTPATIENT
Start: 2017-09-25 | End: 2017-09-25

## 2017-09-25 RX ADMIN — Medication 25 MILLIGRAM(S): at 11:50

## 2017-09-25 RX ADMIN — DEXMEDETOMIDINE HYDROCHLORIDE IN 0.9% SODIUM CHLORIDE 4.08 MICROGRAM(S)/KG/HR: 4 INJECTION INTRAVENOUS at 16:00

## 2017-09-25 RX ADMIN — Medication 400 GRAM(S): at 17:50

## 2017-09-25 RX ADMIN — AMIODARONE HYDROCHLORIDE 16.67 MG/MIN: 400 TABLET ORAL at 12:00

## 2017-09-25 RX ADMIN — Medication 400 GRAM(S): at 23:21

## 2017-09-25 RX ADMIN — CHLORHEXIDINE GLUCONATE 15 MILLILITER(S): 213 SOLUTION TOPICAL at 17:55

## 2017-09-25 RX ADMIN — Medication 5 MILLIGRAM(S): at 08:03

## 2017-09-25 RX ADMIN — Medication 1: at 06:25

## 2017-09-25 RX ADMIN — HEPARIN SODIUM 5.5 UNIT(S)/HR: 5000 INJECTION INTRAVENOUS; SUBCUTANEOUS at 02:14

## 2017-09-25 RX ADMIN — HEPARIN SODIUM 6 UNIT(S)/HR: 5000 INJECTION INTRAVENOUS; SUBCUTANEOUS at 22:56

## 2017-09-25 RX ADMIN — Medication 40 MILLIEQUIVALENT(S): at 08:04

## 2017-09-25 RX ADMIN — Medication 1: at 17:58

## 2017-09-25 RX ADMIN — CHLORHEXIDINE GLUCONATE 15 MILLILITER(S): 213 SOLUTION TOPICAL at 05:04

## 2017-09-25 RX ADMIN — HEPARIN SODIUM 6.5 UNIT(S)/HR: 5000 INJECTION INTRAVENOUS; SUBCUTANEOUS at 17:00

## 2017-09-25 RX ADMIN — LISINOPRIL 5 MILLIGRAM(S): 2.5 TABLET ORAL at 16:00

## 2017-09-25 RX ADMIN — Medication 1: at 00:38

## 2017-09-25 RX ADMIN — Medication 1: at 11:51

## 2017-09-25 RX ADMIN — Medication 25 MILLIGRAM(S): at 16:00

## 2017-09-25 RX ADMIN — HEPARIN SODIUM 6 UNIT(S)/HR: 5000 INJECTION INTRAVENOUS; SUBCUTANEOUS at 09:48

## 2017-09-25 RX ADMIN — CLOPIDOGREL BISULFATE 75 MILLIGRAM(S): 75 TABLET, FILM COATED ORAL at 11:50

## 2017-09-25 RX ADMIN — PANTOPRAZOLE SODIUM 40 MILLIGRAM(S): 20 TABLET, DELAYED RELEASE ORAL at 11:50

## 2017-09-25 RX ADMIN — Medication 63.75 MILLIMOLE(S): at 23:22

## 2017-09-25 RX ADMIN — Medication 50 GRAM(S): at 23:19

## 2017-09-25 RX ADMIN — Medication 81 MILLIGRAM(S): at 11:50

## 2017-09-25 RX ADMIN — MEROPENEM 200 MILLIGRAM(S): 1 INJECTION INTRAVENOUS at 21:41

## 2017-09-25 NOTE — CHART NOTE - NSCHARTNOTEFT_GEN_A_CORE
====================  CCU MIDNIGHT ROUNDS  ====================    JIMI BUCHANAN  [id number]    ====================  SUMMARY:  ====================    71 yo M w extensive CAD, T2DM, ESRD new HD, prior CVA who was transferred from OSH for NSTEMI s/p impella assisted C 1wk ago being treated for pyelonephritis with course c/b obstructive nephropathy requiring HD transferred from floor to CCU after cardiac arrest, vtach and subsequent ROSC. c/b a fib RVR, new acute embolic stroke w/ R sided hemiparesis. S/p extubation 9/25     ====================  NEW EVENTS:  ====================    successfully extubated, pending bedside s&s    ====================  VITALS (Last 12 hrs):  ====================    T(C): 37.3 (09-25-17 @ 19:00), Max: 37.3 (09-25-17 @ 19:00)  HR: 78 (09-25-17 @ 23:00) (76 - 119)  ABP: 140/56 (09-25-17 @ 23:00) (126/49 - 158/64)  ABP(mean): 84 (09-25-17 @ 23:00) (74 - 106)  RR: 20 (09-25-17 @ 23:00) (19 - 30)  SpO2: 100% (09-25-17 @ 23:00) (98% - 100%)  CVP(mm Hg): 3 (09-25-17 @ 23:00) (0 - 6)    TELEMETRY: a fib     I&O's Summary    24 Sep 2017 07:01  -  25 Sep 2017 07:00  --------------------------------------------------------  IN: 1273.8 mL / OUT: 66 mL / NET: 1207.8 mL    25 Sep 2017 07:01  -  25 Sep 2017 23:53  --------------------------------------------------------  IN: 1605.9 mL / OUT: 1647 mL / NET: -41.1 mL    ====================  PLAN:  ====================  c/w ASA, plavix, lipitor for CAD s/p BABAR x 3 LAD, BABAR x 2 RCA   acute stroke - c/w ASA, lipitor, plan for MRI when stable, PT/OT   c/w amio and patient specific heparin for a fib, eventual PO amio if passes bedside s&s to 10 gm load   c/w broad spec ABX, f/u cultures, vanco by level   HD per renal   PT, OT, ST Jenelle Tejada CCU NP   #[id number]

## 2017-09-25 NOTE — PROGRESS NOTE ADULT - ASSESSMENT
Patient is a 71 y/o M w/ a PMHx of extensive CAD, T2DM, ESRD new HD, prior CVA who was transferred from OSH for NSTEMI s/p impella assisted LHC and pyelonephritis with course c/b obstructive nephropathy requiring HD transferred from floor to CCU after cardiac arrest, v-tach and subsequent ROSC w/ hospital course further c/b embolic CVA (9/23).    # Neuro       Intubated, Sedation d/c. MS improving. CT head on 9/23 showed new embolic stroke. Heparin gtt started last night      - heparin gtt on hold over concern for bleed, pending neurology attending input, will f/u recs.       - CPAP trial today for possible extubation           #CVS     - S/p Cardiac arrest/vtach               Afib with RVR: Continue amiodarone.               C/w amio gtt. Arrythmia likely triggered by electrolyte abnormalities in the setting of pt refusing HD for few days.                Given the recent NSTEMI, complicated cath, ischemia/cath complication incl. instent thrombosis a possibility. Cardiac       - c/w aspirin, plavix, lipitor.        #Pulm:      - Acute hypoxic respiratory failure in the setting of cardiac arrest              Will try CPAP today and plan to extubate if successful.        #GI:        NG tube in place, pt NPO except for medication. If pt fails CPAP trial, will start tube feeds.    #Renal:      - Pyelo with LIN progressing to ESRD requiring HD:  on Vanc and Zosyn since 9/22. Cultures ngtd. OSH contacted for culture data. Acute leukocytosis more likely 2/2 cardiac arrest related systemic inflammatory response.                 HD per renal        #ID:     On Vanc since 9/22, meropenem since 9/23. Cultures ngtd. OSH contacted for culture data.  Hx of Pyelonephritis.    #Endo        Transitioned to insulin SS.    #Heme: Inappropriate response to 1.5 units of pRBC, CT a/p showed no RP bleed.  	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    # Neuro  - Patient is currently intubated, off sedation. CT head on 9/23 showed new embolic stroke. Mental status is improving. He is following commands this AM.  - Neurology following, appreciate recs. Pt is currently on a Heparin gtt (goal pTT = 50-60). C/w ASA and Lipitor.    # CVS     - S/p Cardiac arrest/vtach               Afib with RVR: Continue amiodarone.               C/w amio gtt. Arrythmia likely triggered by electrolyte abnormalities in the setting of pt refusing HD for few days.                Given the recent NSTEMI, complicated cath, ischemia/cath complication incl. instent thrombosis a possibility. Cardiac       - c/w aspirin, plavix, lipitor.        #Pulm:      - Acute hypoxic respiratory failure in the setting of cardiac arrest              Will try CPAP today and plan to extubate if successful.        #GI:        NG tube in place, pt NPO except for medication. If pt fails CPAP trial, will start tube feeds.    #Renal:      - Pyelo with LIN progressing to ESRD requiring HD:  on Vanc and Zosyn since 9/22. Cultures ngtd. OSH contacted for culture data. Acute leukocytosis more likely 2/2 cardiac arrest related systemic inflammatory response.                 HD per renal        #ID:     On Vanc since 9/22, meropenem since 9/23. Cultures ngtd. OSH contacted for culture data.  Hx of Pyelonephritis.    #Endo        Transitioned to insulin SS.    #Heme: Inappropriate response to 1.5 units of pRBC, CT a/p showed no RP bleed.  	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    # Neuro  - Patient is currently intubated, off sedation. CT head on 9/23 showed new embolic stroke. Mental status is improving. He is following commands this AM.  - Neurology following, appreciate recs. Pt is currently on a Heparin gtt (goal pTT = 50-60). C/w ASA and Lipitor.  # CVS  - Patient initially p/w NSTEMI s/p Impella assisted PCI w/ 3 BABAR placed to LAD and 2 BABAR placed to RCA on 9/14/2017. C/w DAPT.  - Hospital course c/b cardiac arrest/V-tach on 9/21 s/p Intubation. Patient also developed new-onset A-fib. He is currently on an Amiodarone gtt. Will plan for a 10g load. Rate has been grossly controlled while on Amio gtt.  - TTE on 9/22 showed severe segmental LV systolic dysfunction (EF ~ 20%).  - As pt's BP has improved, will start Lopressor 25mg PO BID today. If BP remains stable, will plan to start Lisinopril 5mg in the PM as well.  - C/w Lipitor 40mg QHS (continue with current dose while on Amiodarone).   # Resp   - Pt intubated on 9/21 due to acute hypoxic respiratory failure in the setting of cardiac arrest  - Will try to extubate today pending CPAP trials  # GI:   - Patient is NPO with tube feeds. C/w IV Protonix 40mg QD.  # Renal:   - Patient p/w pyelonephritis and nephrolithiasis w/ LIN which progressed to ESRD requiring HD.   - Nephrology following, appreciate recs. Patient is currently with minimal UOP. Plan for HD today.  - Monitor Is/Os      # ID:   - On Vanc since 9/22, meropenem since 9/23. Cultures ngtd. OSH contacted for culture data.  Hx of Pyelonephritis.     on Vanc and Zosyn since 9/22. Cultures ngtd. OSH contacted for culture data. Acute leukocytosis more likely 2/2 cardiac arrest related systemic inflammatory response.     #Endo        Transitioned to insulin SS.    #Heme: Inappropriate response to 1.5 units of pRBC, CT a/p showed no RP bleed.  	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    # Neuro  - Patient is currently intubated, off sedation. CT head on 9/23 showed new embolic stroke. Mental status is improving. He is following commands this AM.  - Neurology following, appreciate recs. Pt is currently on a Heparin gtt (goal pTT = 50-60).   - C/w ASA and Lipitor.  # CVS  - Patient initially p/w NSTEMI s/p Impella assisted PCI w/ 3 BABAR placed to LAD and 2 BABAR placed to RCA on 9/14/2017. C/w DAPT.  - Hospital course c/b cardiac arrest/V-tach on 9/21 s/p Intubation. Patient also developed new-onset A-fib. He is currently on an Amiodarone gtt. Will plan for a 10g load. Rate has been grossly controlled while on Amio gtt.  - TTE on 9/22 showed severe segmental LV systolic dysfunction (EF ~ 20%).  - As pt's BP has improved, will start Lopressor 25mg PO BID today. If BP remains stable, will plan to start Lisinopril 5mg in the PM as well.  - C/w Lipitor 40mg QHS (continue with current dose while on Amiodarone).   # Resp   - Pt intubated on 9/21 due to acute hypoxic respiratory failure in the setting of cardiac arrest  - Will try to extubate today pending CPAP trials  # GI:   - Patient is NPO with tube feeds. C/w IV Protonix 40mg QD.  # Renal:   - Patient p/w pyelonephritis and nephrolithiasis w/ LIN which progressed to ESRD requiring HD.   - Nephrology following, appreciate recs. Patient is currently with minimal UOP. Plan for HD today.  - Monitor Is/Os      # ID:   - Pt spiked a Temp of 101F on 9/23. Tmax of 100.1F in the last 24 hours. Pt has a leukocytosis which is currently downtrending. Unclear if leukocytosis is 2/2 inflammatory response from recent cardiac arrest.   - Pt is currently on Vancomycin (by-level) (started 9/22) and IV Meropenem 500mg Q24H (started 9/23). Will continue to monitor Vanc trough levels.  - Blood Cx (9/22): NGTD. Unable to obtain a urine sample to assess UA/UCx. No obvious consolidation on CXR. Unclear source for infection at this time.   - Will continue to monitor CBC and fever curve  # Endo  - Patient with T2DM. C/w HISS. Monitor FS  # Heme:  - Hospital course c/b left groin hematoma which was seen to decrease in size on CT A+P on 9/23. Patient does have an area of ecchymosis on his left proximal anterior thigh extending to his LLQ. Will closely monitor for extension of ecchymosis. Patient's Hgb has been around 7-8 recently. Suspect AOCD component given ESRD. Patient is on a Heparin gtt. CT A+P showed no evidence of an RP bleed. No overt signs of bleeding clinically. FOBT was negative. Patient is s/p 4U of pRBCs this admission.   - Will c/w heparin gtt (goal pTT = 50-60). Will monitor CBC closely and maintain and active T+S.  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    # Neuro  - Patient is currently intubated, off sedation. CT head on 9/23 showed new embolic stroke. Mental status is improving. He is following commands this AM.  - Neurology following, appreciate recs. Pt is currently on a Heparin gtt (goal pTT = 50-60).   - C/w ASA and Lipitor.  # CVS  - Patient initially p/w NSTEMI s/p Impella assisted PCI w/ 3 BABAR placed to LAD and 2 BABAR placed to RCA on 9/14/2017. C/w DAPT.  - Hospital course c/b cardiac arrest/V-tach on 9/21 s/p Intubation. Patient also developed new-onset A-fib. He is currently on an Amiodarone gtt. Will plan for a 10g load. Rate has been grossly controlled while on Amio gtt.  - TTE on 9/22 showed severe segmental LV systolic dysfunction (EF ~ 20%).  - As pt's BP has improved, will start Lopressor 25mg PO BID today. If BP remains stable, will plan to start Lisinopril 5mg in the PM as well.  - C/w Lipitor 40mg QHS (continue with current dose while on Amiodarone).   # Resp   - Pt intubated on 9/21 due to acute hypoxic respiratory failure in the setting of cardiac arrest  - Will check an ABG and try to extubate today pending CPAP trials  # GI:   - Patient is NPO with tube feeds. C/w IV Protonix 40mg QD.  # Renal:   - Patient p/w pyelonephritis and nephrolithiasis w/ LIN which progressed to ESRD requiring HD.   - Nephrology following, appreciate recs. Patient is currently with minimal UOP. Plan for HD today.  - Monitor Is/Os      # ID:   - Pt spiked a Temp of 101F on 9/23. Tmax of 100.1F in the last 24 hours. Pt has a leukocytosis which is currently downtrending. Unclear if leukocytosis is 2/2 inflammatory response from recent cardiac arrest.   - Pt is currently on Vancomycin (by-level) (started 9/22) and IV Meropenem 500mg Q24H (started 9/23). Will continue to monitor Vanc trough levels.  - Blood Cx (9/22): NGTD. Unable to obtain a urine sample to assess UA/UCx. No obvious consolidation on CXR. Unclear source for infection at this time.   - Will continue to monitor CBC and fever curve  # Endo  - Patient with T2DM. C/w HISS. Monitor FS  # Heme:  - Hospital course c/b left groin hematoma which was seen to decrease in size on CT A+P on 9/23. Patient does have an area of ecchymosis on his left proximal anterior thigh extending to his LLQ. Will closely monitor for extension of ecchymosis. Patient's Hgb has been around 7-8 recently. Suspect AOCD component given ESRD. Patient is on a Heparin gtt. CT A+P showed no evidence of an RP bleed. No overt signs of bleeding clinically. FOBT was negative. Patient is s/p 4U of pRBCs this admission.   - Will c/w heparin gtt (goal pTT = 50-60). Will monitor CBC closely and maintain and active T+S.  # DVT PPx  - Pt currently on a Heparin gtt Patient is a 71 y/o M w/ a PMHx of extensive CAD, T2DM, ESRD new HD, prior CVA who was transferred from OSH for NSTEMI s/p impella assisted LHC and pyelonephritis with course c/b obstructive nephropathy requiring HD transferred from floor to CCU after cardiac arrest, v-tach and subsequent ROSC w/ hospital course further c/b embolic CVA (9/23).    # Neuro  - Patient is currently intubated, off sedation. CT head on 9/23 showed new embolic stroke. Mental status is improving. He is following commands this AM.  - Neurology following, appreciate recs. Pt is currently on a Heparin gtt (goal pTT = 50-70).   - C/w ASA and Lipitor.  # CVS  - Patient initially p/w NSTEMI s/p Impella assisted PCI w/ 3 BABAR placed to LAD and 2 BABAR placed to RCA on 9/14/2017. C/w DAPT.  - Hospital course c/b cardiac arrest/V-tach on 9/21 s/p Intubation. Patient also developed new-onset A-fib. He is currently on an Amiodarone gtt. Will plan for a 10g load. Rate has been grossly controlled while on Amio gtt.  - TTE on 9/22 showed severe segmental LV systolic dysfunction (EF ~ 20%).  - As pt's BP has improved, will start Lopressor 25mg PO BID today. If BP remains stable, will plan to start Lisinopril 5mg in the PM as well.  - C/w Lipitor 40mg QHS (continue with current dose while on Amiodarone).   # Resp   - Pt intubated on 9/21 due to acute hypoxic respiratory failure in the setting of cardiac arrest  - Will check an ABG and try to extubate today pending CPAP trials  # GI:   - Patient is NPO with tube feeds. C/w IV Protonix 40mg QD.  # Renal:   - Patient p/w pyelonephritis and nephrolithiasis w/ LIN which progressed to ESRD requiring HD.   - Nephrology following, appreciate recs. Patient is currently with minimal UOP. Plan for HD today.  - Monitor Is/Os      # ID:   - Pt spiked a Temp of 101F on 9/23. Tmax of 100.1F in the last 24 hours. Pt has a leukocytosis which is currently downtrending. Unclear if leukocytosis is 2/2 inflammatory response from recent cardiac arrest.   - Pt is currently on Vancomycin (by-level) (started 9/22) and IV Meropenem 500mg Q24H (started 9/23). Will continue to monitor Vanc trough levels.  - Blood Cx (9/22): NGTD. Unable to obtain a urine sample to assess UA/UCx. No obvious consolidation on CXR. Unclear source for infection at this time.   - Will continue to monitor CBC and fever curve  # Endo  - Patient with T2DM. C/w HISS. Monitor FS  # Heme:  - Hospital course c/b left groin hematoma which was seen to decrease in size on CT A+P on 9/23. Patient does have an area of ecchymosis on his left proximal anterior thigh extending to his LLQ. Will closely monitor for extension of ecchymosis. Patient's Hgb has been around 7-8 recently. Suspect AOCD component given ESRD. Patient is on a Heparin gtt. CT A+P showed no evidence of an RP bleed. No overt signs of bleeding clinically. FOBT was negative. Patient is s/p 4U of pRBCs this admission.   - Will c/w heparin gtt (goal pTT = 50-70). Will monitor CBC closely and maintain and active T+S.  # DVT PPx  - Pt currently on a Heparin gtt

## 2017-09-25 NOTE — PROGRESS NOTE ADULT - SUBJECTIVE AND OBJECTIVE BOX
Seen in CCU, awake, alert    Vital Signs Last 24 Hrs  T(C): 36.7 (09-25-17 @ 15:30), Max: 37.8 (09-25-17 @ 07:00)  T(F): 98.1 (09-25-17 @ 15:30), Max: 100.1 (09-25-17 @ 07:00)  HR: 76 (09-25-17 @ 18:00) (76 - 124)  BP: 127/69 (09-25-17 @ 07:00) (127/69 - 127/69)  BP(mean): 93 (09-25-17 @ 07:00) (93 - 93)  RR: 19 (09-25-17 @ 18:00) (17 - 30)  SpO2: 100% (09-25-17 @ 18:00) (96% - 100%)                   8.4    18.4  )-----------( 156      ( 25 Sep 2017 17:42 )             24.6     25 Sep 2017 17:42    135    |  95     |  36     ----------------------------<  163    3.9     |  21     |  4.71     Ca    8.3        25 Sep 2017 17:42  Phos  6.1       25 Sep 2017 06:01  Mg     2.3       25 Sep 2017 06:01    TPro  6.4    /  Alb  2.5    /  TBili  0.7    /  DBili  x      /  AST  77     /  ALT  54     /  AlkPhos  114    25 Sep 2017 17:42    LIVER FUNCTIONS - ( 25 Sep 2017 17:42 )  Alb: 2.5 g/dL / Pro: 6.4 g/dL / ALK PHOS: 114 U/L / ALT: 54 U/L RC / AST: 77 U/L / GGT: x           General: NAD  Respiratory: b/l air entry, clear anteriorly  Cardiovascular: S1 S2 reg  Gastrointestinal: soft, NT, BS present  Extremities:  no edema    tamsulosin 0.4 milliGRAM(s) Oral at bedtime  clopidogrel Tablet 75 milliGRAM(s) Oral daily  busPIRone 5 milliGRAM(s) Oral two times a day  pantoprazole  Injectable 40 milliGRAM(s) IV Push daily  atorvastatin 40 milliGRAM(s) Oral at bedtime  aspirin  chewable 81 milliGRAM(s) Oral daily  insulin lispro (HumaLOG) corrective regimen sliding scale   SubCutaneous every 6 hours  meropenem IVPB 500 milliGRAM(s) IV Intermittent every 24 hours  chlorhexidine 0.12% Liquid 15 milliLiter(s) Swish and Spit two times a day  amiodarone Infusion 0.5 mG/Min IV Continuous <Continuous>  heparin  Infusion 550 Unit(s)/Hr IV Continuous <Continuous>  metoprolol 25 milliGRAM(s) Enteral Tube two times a day  calcium gluconate IVPB 2 Gram(s) IV Intermittent every 4 hours  dexmedetomidine Infusion 0.2 MICROgram(s)/kG/Hr IV Continuous <Continuous>  lisinopril 5 milliGRAM(s) Oral daily      Assessment and Plan:   		  CM, NSTEMI, s/p cath, PCI, s/p arrest, intubation  S/p CVA, RHP  LIN on CKD in setting of non-fx L kidney and R hydro due to stone plus hemodynamic/dye injury  Started on HD in [facility]  Per most recent CT A/P passed stone and hydro resolved  UO still minimal  Daily HD via RIJ HD cath w/fluid removal as able as no signs of renal function recovery so far  While it is likely that pt will require long term dialysis, at this point would avoid ACE/ARB

## 2017-09-25 NOTE — PROGRESS NOTE ADULT - ATTENDING COMMENTS
Patient is seen and examined with fellow, NP and the CCU house-staff. I agree with the history, physical and the assessment and plan.  CPAP trial  start low dose Beta blocker and ace inh  c/w amiodarone  tolerating heparin with stable H/H

## 2017-09-25 NOTE — AIRWAY REMOVAL NOTE  ADULT & PEDS - ARTIFICAL AIRWAY REMOVAL COMMENTS
pt extubated on aerosol mask 40% by respiratory accompanied by RN Terrell, pt tolerating well, no distress noted, will continue to monitor

## 2017-09-25 NOTE — PROGRESS NOTE ADULT - SUBJECTIVE AND OBJECTIVE BOX
Patient is a 72y old  Male who presents with a chief complaint of s/p cardiac cath (22 Sep 2017 12:34)      SUBJECTIVE / OVERNIGHT EVENTS:    MEDICATIONS  (STANDING):  tamsulosin 0.4 milliGRAM(s) Oral at bedtime  clopidogrel Tablet 75 milliGRAM(s) Oral daily  busPIRone 5 milliGRAM(s) Oral two times a day  pantoprazole  Injectable 40 milliGRAM(s) IV Push daily  atorvastatin 40 milliGRAM(s) Oral at bedtime  aspirin  chewable 81 milliGRAM(s) Oral daily  insulin lispro (HumaLOG) corrective regimen sliding scale   SubCutaneous every 6 hours  meropenem IVPB 500 milliGRAM(s) IV Intermittent every 24 hours  chlorhexidine 0.12% Liquid 15 milliLiter(s) Swish and Spit two times a day  amiodarone Infusion 0.5 mG/Min (16.667 mL/Hr) IV Continuous <Continuous>  heparin  Infusion 550 Unit(s)/Hr (5.5 mL/Hr) IV Continuous <Continuous>    MEDICATIONS  (PRN):        CAPILLARY BLOOD GLUCOSE  187 (25 Sep 2017 06:00)  204 (24 Sep 2017 18:23)  214 (24 Sep 2017 12:34)        I&O's Summary    24 Sep 2017 07:01  -  25 Sep 2017 07:00  --------------------------------------------------------  IN: 1273.8 mL / OUT: 66 mL / NET: 1207.8 mL    25 Sep 2017 07:01  -  25 Sep 2017 08:33  --------------------------------------------------------  IN: 122.2 mL / OUT: 10 mL / NET: 112.2 mL        PHYSICAL EXAM:  GENERAL: NAD, well-developed  HEAD:  Atraumatic, Normocephalic  EYES: EOMI, PERRLA, conjunctiva and sclera clear  NECK: Supple, No JVD  CHEST/LUNG: Clear to auscultation bilaterally; No wheeze  HEART: Regular rate and rhythm; No murmurs, rubs, or gallops  ABDOMEN: Soft, Nontender, Nondistended; Bowel sounds present  EXTREMITIES:  2+ Peripheral Pulses, No clubbing, cyanosis, or edema  PSYCH: AAOx3  NEUROLOGY: non-focal  SKIN: No rashes or lesions    LABS:                        8.2    17.9  )-----------( 146      ( 25 Sep 2017 06:01 )             24.0     09-25    132<L>  |  92<L>  |  60<H>  ----------------------------<  197<H>  3.5   |  21<L>  |  6.95<H>    Ca    8.2<L>      25 Sep 2017 06:01  Phos  6.1     09-25  Mg     2.3     09-25    TPro  6.7  /  Alb  3.0<L>  /  TBili  0.7  /  DBili  x   /  AST  158<H>  /  ALT  67<H>  /  AlkPhos  83  09-24    PTT - ( 25 Sep 2017 01:21 )  PTT:43.8 sec          RADIOLOGY & ADDITIONAL TESTS:    Imaging Personally Reviewed:    Consultant(s) Notes Reviewed:      Care Discussed with Consultants/Other Providers: Patient is a 72y old  Male who presents with a chief complaint of s/p cardiac cath (22 Sep 2017 12:34)      SUBJECTIVE / OVERNIGHT EVENTS:    MEDICATIONS  (STANDING):  tamsulosin 0.4 milliGRAM(s) Oral at bedtime  clopidogrel Tablet 75 milliGRAM(s) Oral daily  busPIRone 5 milliGRAM(s) Oral two times a day  pantoprazole  Injectable 40 milliGRAM(s) IV Push daily  atorvastatin 40 milliGRAM(s) Oral at bedtime  aspirin  chewable 81 milliGRAM(s) Oral daily  insulin lispro (HumaLOG) corrective regimen sliding scale   SubCutaneous every 6 hours  meropenem IVPB 500 milliGRAM(s) IV Intermittent every 24 hours  chlorhexidine 0.12% Liquid 15 milliLiter(s) Swish and Spit two times a day  amiodarone Infusion 0.5 mG/Min (16.667 mL/Hr) IV Continuous <Continuous>  heparin  Infusion 550 Unit(s)/Hr (5.5 mL/Hr) IV Continuous <Continuous>    MEDICATIONS  (PRN):        CAPILLARY BLOOD GLUCOSE  187 (25 Sep 2017 06:00)  204 (24 Sep 2017 18:23)  214 (24 Sep 2017 12:34)        I&O's Summary    24 Sep 2017 07:01  -  25 Sep 2017 07:00  --------------------------------------------------------  IN: 1273.8 mL / OUT: 66 mL / NET: 1207.8 mL    25 Sep 2017 07:01  -  25 Sep 2017 08:33  --------------------------------------------------------  IN: 122.2 mL / OUT: 10 mL / NET: 112.2 mL    PHYSICAL EXAM:  GENERAL: Off sedation on ventilator  HEAD:  Atraumatic, Normocephalic  EYES: EOMI, PERRLA, conjunctiva and sclera clear  NECK: ET tube in place, Supple, No JVD, Normal thyroid  CHEST/LUNG: Clear to percussion bilaterally; No rales, rhonchi, wheezing, or rubs  HEART: Regular rate and rhythm; No murmurs, rubs, or gallops  ABDOMEN: Soft, Nontender, Nondistended; Bowel sounds present.   EXTREMITIES:  2+ Peripheral Pulses, No clubbing, cyanosis, or edema  LYMPH: No lymphadenopathy noted  SKIN: No rashes or lesions  NERVOUS SYSTEM:  ; Intubated. Opening eyes spontaneously. Moves L upper and lower extremities on command, unable to move on right.      LABS:                        8.2    17.9  )-----------( 146      ( 25 Sep 2017 06:01 )             24.0     09-25    132<L>  |  92<L>  |  60<H>  ----------------------------<  197<H>  3.5   |  21<L>  |  6.95<H>    Ca    8.2<L>      25 Sep 2017 06:01  Phos  6.1     09-25  Mg     2.3     09-25    TPro  6.7  /  Alb  3.0<L>  /  TBili  0.7  /  DBili  x   /  AST  158<H>  /  ALT  67<H>  /  AlkPhos  83  09-24    PTT - ( 25 Sep 2017 01:21 )  PTT:43.8 sec          RADIOLOGY & ADDITIONAL TESTS:    Imaging Personally Reviewed:    Consultant(s) Notes Reviewed:      Care Discussed with Consultants/Other Providers: Patient is a 72y old  Male who presents with a chief complaint of s/p cardiac cath (22 Sep 2017 12:34)      SUBJECTIVE / OVERNIGHT EVENTS: Patient seen and examined. Patient is intubated and off sedation. He opens his eyes spontaneously and can move LUE and LLE on command. He continues to have hemiparesis on the right side. Rate has been grossly controlled on Amio gtt. He is currently on Heparin gtt as well.    MEDICATIONS  (STANDING):  tamsulosin 0.4 milliGRAM(s) Oral at bedtime  clopidogrel Tablet 75 milliGRAM(s) Oral daily  busPIRone 5 milliGRAM(s) Oral two times a day  pantoprazole  Injectable 40 milliGRAM(s) IV Push daily  atorvastatin 40 milliGRAM(s) Oral at bedtime  aspirin  chewable 81 milliGRAM(s) Oral daily  insulin lispro (HumaLOG) corrective regimen sliding scale   SubCutaneous every 6 hours  meropenem IVPB 500 milliGRAM(s) IV Intermittent every 24 hours  chlorhexidine 0.12% Liquid 15 milliLiter(s) Swish and Spit two times a day  amiodarone Infusion 0.5 mG/Min (16.667 mL/Hr) IV Continuous <Continuous>  heparin  Infusion 550 Unit(s)/Hr (5.5 mL/Hr) IV Continuous <Continuous>    MEDICATIONS  (PRN):        CAPILLARY BLOOD GLUCOSE  187 (25 Sep 2017 06:00)  204 (24 Sep 2017 18:23)  214 (24 Sep 2017 12:34)        I&O's Summary    24 Sep 2017 07:01  -  25 Sep 2017 07:00  --------------------------------------------------------  IN: 1273.8 mL / OUT: 66 mL / NET: 1207.8 mL    25 Sep 2017 07:01  -  25 Sep 2017 08:33  --------------------------------------------------------  IN: 122.2 mL / OUT: 10 mL / NET: 112.2 mL    PHYSICAL EXAM:  GENERAL: Off sedation on ventilator  HEAD:  Atraumatic, Normocephalic  EYES: EOMI, PERRLA, conjunctiva and sclera clear  NECK: ET tube in place, Supple, No JVD, Normal thyroid  CHEST/LUNG: Clear to percussion bilaterally; No rales, rhonchi, wheezing, or rubs  HEART: Regular rate and rhythm; No murmurs, rubs, or gallops  ABDOMEN: Soft, Nontender, Nondistended; Bowel sounds present.   EXTREMITIES:  2+ Peripheral Pulses, No clubbing, cyanosis, or edema  LYMPH: No lymphadenopathy noted  SKIN: No rashes or lesions  NERVOUS SYSTEM:  ; Intubated. Opening eyes spontaneously. Moves L upper and lower extremities on command, unable to move on right.      LABS:                        8.2    17.9  )-----------( 146      ( 25 Sep 2017 06:01 )             24.0     09-25    132<L>  |  92<L>  |  60<H>  ----------------------------<  197<H>  3.5   |  21<L>  |  6.95<H>    Ca    8.2<L>      25 Sep 2017 06:01  Phos  6.1     09-25  Mg     2.3     09-25    TPro  6.7  /  Alb  3.0<L>  /  TBili  0.7  /  DBili  x   /  AST  158<H>  /  ALT  67<H>  /  AlkPhos  83  09-24    PTT - ( 25 Sep 2017 01:21 )  PTT:43.8 sec          RADIOLOGY & ADDITIONAL TESTS:    Imaging Personally Reviewed:    Consultant(s) Notes Reviewed:      Care Discussed with Consultants/Other Providers: Patient is a 72y old  Male who presents with a chief complaint of s/p cardiac cath (22 Sep 2017 12:34)    SUBJECTIVE / OVERNIGHT EVENTS:   Patient is currently intubated and off sedation. He opens his eyes spontaneously and can move LUE and LLE on command. He continues to have hemiparesis on the right side. Rate has been grossly controlled on Amio gtt. He is currently on Heparin gtt as well. No active signs of bleeding has been seen.    MEDICATIONS  (STANDING):  tamsulosin 0.4 milliGRAM(s) Oral at bedtime  clopidogrel Tablet 75 milliGRAM(s) Oral daily  busPIRone 5 milliGRAM(s) Oral two times a day  pantoprazole  Injectable 40 milliGRAM(s) IV Push daily  atorvastatin 40 milliGRAM(s) Oral at bedtime  aspirin  chewable 81 milliGRAM(s) Oral daily  insulin lispro (HumaLOG) corrective regimen sliding scale   SubCutaneous every 6 hours  meropenem IVPB 500 milliGRAM(s) IV Intermittent every 24 hours  chlorhexidine 0.12% Liquid 15 milliLiter(s) Swish and Spit two times a day  amiodarone Infusion 0.5 mG/Min (16.667 mL/Hr) IV Continuous <Continuous>  heparin  Infusion 550 Unit(s)/Hr (5.5 mL/Hr) IV Continuous <Continuous>    MEDICATIONS  (PRN):    CAPILLARY BLOOD GLUCOSE  187 (25 Sep 2017 06:00)  204 (24 Sep 2017 18:23)  214 (24 Sep 2017 12:34)    I&O's Summary    24 Sep 2017 07:01  -  25 Sep 2017 07:00  --------------------------------------------------------  IN: 1273.8 mL / OUT: 66 mL / NET: 1207.8 mL    25 Sep 2017 07:01  -  25 Sep 2017 08:33  --------------------------------------------------------  IN: 122.2 mL / OUT: 10 mL / NET: 112.2 mL    PHYSICAL EXAM:  GENERAL: Off sedation on ventilator, NAD  HEAD: NC/AT  NECK: ET tube in place, Supple  CHEST/LUNG: Grossly ; No rales, rhonchi, wheezing, or rubs  HEART: Regular rate and rhythm; No murmurs, rubs, or gallops  ABDOMEN: Soft, Nontender, Nondistended; Bowel sounds present.   EXTREMITIES:  2+ Peripheral Pulses, No clubbing, cyanosis, or edema  LYMPH: No lymphadenopathy noted  SKIN: No rashes or lesions  NERVOUS SYSTEM:  ; Intubated. Opening eyes spontaneously. Moves L upper and lower extremities on command, unable to move on right.      LABS:                        8.2    17.9  )-----------( 146      ( 25 Sep 2017 06:01 )             24.0     09-25    132<L>  |  92<L>  |  60<H>  ----------------------------<  197<H>  3.5   |  21<L>  |  6.95<H>    Ca    8.2<L>      25 Sep 2017 06:01  Phos  6.1     09-25  Mg     2.3     09-25    TPro  6.7  /  Alb  3.0<L>  /  TBili  0.7  /  DBili  x   /  AST  158<H>  /  ALT  67<H>  /  AlkPhos  83  09-24    PTT - ( 25 Sep 2017 01:21 )  PTT:43.8 sec          RADIOLOGY & ADDITIONAL TESTS:    Imaging Personally Reviewed:    Consultant(s) Notes Reviewed:      Care Discussed with Consultants/Other Providers: Patient is a 72y old  Male who presents with a chief complaint of s/p cardiac cath (22 Sep 2017 12:34)    SUBJECTIVE / OVERNIGHT EVENTS:   Patient is currently intubated and off sedation. He opens his eyes spontaneously and can move LUE and LLE on command. He continues to have hemiparesis on the right side. Rate has been grossly controlled on Amio gtt. He is currently on Heparin gtt as well. No active signs of bleeding has been seen.    MEDICATIONS  (STANDING):  tamsulosin 0.4 milliGRAM(s) Oral at bedtime  clopidogrel Tablet 75 milliGRAM(s) Oral daily  busPIRone 5 milliGRAM(s) Oral two times a day  pantoprazole  Injectable 40 milliGRAM(s) IV Push daily  atorvastatin 40 milliGRAM(s) Oral at bedtime  aspirin  chewable 81 milliGRAM(s) Oral daily  insulin lispro (HumaLOG) corrective regimen sliding scale   SubCutaneous every 6 hours  meropenem IVPB 500 milliGRAM(s) IV Intermittent every 24 hours  chlorhexidine 0.12% Liquid 15 milliLiter(s) Swish and Spit two times a day  amiodarone Infusion 0.5 mG/Min (16.667 mL/Hr) IV Continuous <Continuous>  heparin  Infusion 550 Unit(s)/Hr (5.5 mL/Hr) IV Continuous <Continuous>    MEDICATIONS  (PRN):    CAPILLARY BLOOD GLUCOSE  187 (25 Sep 2017 06:00)  204 (24 Sep 2017 18:23)  214 (24 Sep 2017 12:34)    I&O's Summary    24 Sep 2017 07:01  -  25 Sep 2017 07:00  --------------------------------------------------------  IN: 1273.8 mL / OUT: 66 mL / NET: 1207.8 mL    25 Sep 2017 07:01  -  25 Sep 2017 08:33  --------------------------------------------------------  IN: 122.2 mL / OUT: 10 mL / NET: 112.2 mL    PHYSICAL EXAM:  GENERAL: Off sedation on ventilator, NAD  HEAD: NC/AT  NECK: ET tube in place, Supple  CHEST/LUNG: Grossly ; No rales, rhonchi, wheezing, or rubs  HEART: Irregular rhythm, Normal rate  ABDOMEN: +BS, Soft, No rigidity   EXTREMITIES: No significant peripheral edema  SKIN: Ecchymosis seen on left proximal anterior thigh extending to LLQ  NERVOUS SYSTEM: Pt is Intubated. Opening eyes spontaneously. Moves L upper and lower extremities on command, unable to move on right.  Psych: Unable to assess      LABS:                        8.2    17.9  )-----------( 146      ( 25 Sep 2017 06:01 )             24.0     09-25    132<L>  |  92<L>  |  60<H>  ----------------------------<  197<H>  3.5   |  21<L>  |  6.95<H>    Ca    8.2<L>      25 Sep 2017 06:01  Phos  6.1     09-25  Mg     2.3     09-25    TPro  6.7  /  Alb  3.0<L>  /  TBili  0.7  /  DBili  x   /  AST  158<H>  /  ALT  67<H>  /  AlkPhos  83  09-24    PTT - ( 25 Sep 2017 01:21 )  PTT:43.8 sec    RADIOLOGY & ADDITIONAL TESTS:    TTE (9/22): Endocardial visualization enhanced with intravenous injection of echo contrast (Definity). Severe segmental left ventricular systolic dysfunction. EF approximately 20%. The inferior, anterior, septal, and apical cap are akinetic. The lateral and inferolateral walls are the best preserved. No left ventricular thrombus. The right ventricle is not well visualized; grossly normal right ventricular systolic function. No pericardial effusion seen.    Head CT (9/23): Multiple areas of hypodensities within the bilateral cerebral and cerebellar hemispheres concerning for embolic infarcts.     CT A+P (9/23): No retroperitoneal hemorrhage. Slightly decreased in size of the left hematoma superficial to the left femoral artery and a decrease in the subcutaneous fat stranding in the left inguinal region. The right hydronephrosis and hydroureter have resolved. The calculi at the right UVJ have migrated into the bladder. No change in the additional, bilateral nonobstructing renal calculi. Multiple bladder calculi. Patient is a 72y old  Male who presents with a chief complaint of s/p cardiac cath (22 Sep 2017 12:34)    SUBJECTIVE / OVERNIGHT EVENTS:   Patient is currently intubated and off sedation. He opens his eyes spontaneously and has good strength in his LUE; however, he continues to have hemiparesis on the right side and his LLE is weak. Rate has been grossly controlled on Amio gtt. He is currently on Heparin gtt as well. No active signs of bleeding has been seen.    MEDICATIONS  (STANDING):  tamsulosin 0.4 milliGRAM(s) Oral at bedtime  clopidogrel Tablet 75 milliGRAM(s) Oral daily  busPIRone 5 milliGRAM(s) Oral two times a day  pantoprazole  Injectable 40 milliGRAM(s) IV Push daily  atorvastatin 40 milliGRAM(s) Oral at bedtime  aspirin  chewable 81 milliGRAM(s) Oral daily  insulin lispro (HumaLOG) corrective regimen sliding scale   SubCutaneous every 6 hours  meropenem IVPB 500 milliGRAM(s) IV Intermittent every 24 hours  chlorhexidine 0.12% Liquid 15 milliLiter(s) Swish and Spit two times a day  amiodarone Infusion 0.5 mG/Min (16.667 mL/Hr) IV Continuous <Continuous>  heparin  Infusion 550 Unit(s)/Hr (5.5 mL/Hr) IV Continuous <Continuous>    MEDICATIONS  (PRN):    CAPILLARY BLOOD GLUCOSE  187 (25 Sep 2017 06:00)  204 (24 Sep 2017 18:23)  214 (24 Sep 2017 12:34)    I&O's Summary    24 Sep 2017 07:01  -  25 Sep 2017 07:00  --------------------------------------------------------  IN: 1273.8 mL / OUT: 66 mL / NET: 1207.8 mL    25 Sep 2017 07:01  -  25 Sep 2017 08:33  --------------------------------------------------------  IN: 122.2 mL / OUT: 10 mL / NET: 112.2 mL    PHYSICAL EXAM:  GENERAL: Off sedation on ventilator, NAD  HEAD: NC/AT  NECK: ET tube in place, Supple  CHEST/LUNG: Grossly CTAB anteriorly, No wheeze appreciated  HEART: Irregular rhythm, Normal rate  ABDOMEN: +BS, Soft, No rigidity   EXTREMITIES: No significant peripheral edema  SKIN: Ecchymosis seen on left proximal anterior thigh extending to LLQ  NERVOUS SYSTEM: Pt is Intubated. Opening eyes spontaneously. Moves LUE on command, Occasional movement of LLE is seen, Pt is unable to move RUE/RLE.  Psych: Unable to assess      LABS:                        8.2    17.9  )-----------( 146      ( 25 Sep 2017 06:01 )             24.0     09-25    132<L>  |  92<L>  |  60<H>  ----------------------------<  197<H>  3.5   |  21<L>  |  6.95<H>    Ca    8.2<L>      25 Sep 2017 06:01  Phos  6.1     09-25  Mg     2.3     09-25    TPro  6.7  /  Alb  3.0<L>  /  TBili  0.7  /  DBili  x   /  AST  158<H>  /  ALT  67<H>  /  AlkPhos  83  09-24    PTT - ( 25 Sep 2017 01:21 )  PTT:43.8 sec    RADIOLOGY & ADDITIONAL TESTS:    TTE (9/22): Endocardial visualization enhanced with intravenous injection of echo contrast (Definity). Severe segmental left ventricular systolic dysfunction. EF approximately 20%. The inferior, anterior, septal, and apical cap are akinetic. The lateral and inferolateral walls are the best preserved. No left ventricular thrombus. The right ventricle is not well visualized; grossly normal right ventricular systolic function. No pericardial effusion seen.    Head CT (9/23): Multiple areas of hypodensities within the bilateral cerebral and cerebellar hemispheres concerning for embolic infarcts.     CT A+P (9/23): No retroperitoneal hemorrhage. Slightly decreased in size of the left hematoma superficial to the left femoral artery and a decrease in the subcutaneous fat stranding in the left inguinal region. The right hydronephrosis and hydroureter have resolved. The calculi at the right UVJ have migrated into the bladder. No change in the additional, bilateral nonobstructing renal calculi. Multiple bladder calculi.

## 2017-09-26 LAB
ANION GAP SERPL CALC-SCNC: 17 MMOL/L — SIGNIFICANT CHANGE UP (ref 5–17)
APTT BLD: 57.3 SEC — HIGH (ref 27.5–37.4)
APTT BLD: 57.3 SEC — HIGH (ref 27.5–37.4)
BASOPHILS # BLD AUTO: 0 K/UL — SIGNIFICANT CHANGE UP (ref 0–0.2)
BASOPHILS NFR BLD AUTO: 0.1 % — SIGNIFICANT CHANGE UP (ref 0–2)
BASOPHILS NFR BLD AUTO: 0.1 % — SIGNIFICANT CHANGE UP (ref 0–2)
BASOPHILS NFR BLD AUTO: 0.2 % — SIGNIFICANT CHANGE UP (ref 0–2)
BUN SERPL-MCNC: 49 MG/DL — HIGH (ref 7–23)
CALCIUM SERPL-MCNC: 9.1 MG/DL — SIGNIFICANT CHANGE UP (ref 8.4–10.5)
CHLORIDE SERPL-SCNC: 93 MMOL/L — LOW (ref 96–108)
CO2 SERPL-SCNC: 22 MMOL/L — SIGNIFICANT CHANGE UP (ref 22–31)
CREAT SERPL-MCNC: 5.91 MG/DL — HIGH (ref 0.5–1.3)
EOSINOPHIL # BLD AUTO: 0 K/UL — SIGNIFICANT CHANGE UP (ref 0–0.5)
EOSINOPHIL # BLD AUTO: 0.1 K/UL — SIGNIFICANT CHANGE UP (ref 0–0.5)
EOSINOPHIL # BLD AUTO: 0.1 K/UL — SIGNIFICANT CHANGE UP (ref 0–0.5)
EOSINOPHIL NFR BLD AUTO: 0.4 % — SIGNIFICANT CHANGE UP (ref 0–6)
EOSINOPHIL NFR BLD AUTO: 0.4 % — SIGNIFICANT CHANGE UP (ref 0–6)
EOSINOPHIL NFR BLD AUTO: 0.9 % — SIGNIFICANT CHANGE UP (ref 0–6)
GLUCOSE SERPL-MCNC: 158 MG/DL — HIGH (ref 70–99)
HCT VFR BLD CALC: 22 % — LOW (ref 39–50)
HCT VFR BLD CALC: 22.5 % — LOW (ref 39–50)
HCT VFR BLD CALC: 23.5 % — LOW (ref 39–50)
HGB BLD-MCNC: 7.6 G/DL — LOW (ref 13–17)
HGB BLD-MCNC: 7.7 G/DL — LOW (ref 13–17)
HGB BLD-MCNC: 8 G/DL — LOW (ref 13–17)
LYMPHOCYTES # BLD AUTO: 1.1 K/UL — SIGNIFICANT CHANGE UP (ref 1–3.3)
LYMPHOCYTES # BLD AUTO: 7.8 % — LOW (ref 13–44)
LYMPHOCYTES # BLD AUTO: 8.8 % — LOW (ref 13–44)
LYMPHOCYTES # BLD AUTO: 8.9 % — LOW (ref 13–44)
MAGNESIUM SERPL-MCNC: 2.9 MG/DL — HIGH (ref 1.6–2.6)
MCHC RBC-ENTMCNC: 30.7 PG — SIGNIFICANT CHANGE UP (ref 27–34)
MCHC RBC-ENTMCNC: 30.9 PG — SIGNIFICANT CHANGE UP (ref 27–34)
MCHC RBC-ENTMCNC: 31 PG — SIGNIFICANT CHANGE UP (ref 27–34)
MCHC RBC-ENTMCNC: 34.1 GM/DL — SIGNIFICANT CHANGE UP (ref 32–36)
MCHC RBC-ENTMCNC: 34.2 GM/DL — SIGNIFICANT CHANGE UP (ref 32–36)
MCHC RBC-ENTMCNC: 34.5 GM/DL — SIGNIFICANT CHANGE UP (ref 32–36)
MCV RBC AUTO: 89.9 FL — SIGNIFICANT CHANGE UP (ref 80–100)
MCV RBC AUTO: 89.9 FL — SIGNIFICANT CHANGE UP (ref 80–100)
MCV RBC AUTO: 90.2 FL — SIGNIFICANT CHANGE UP (ref 80–100)
MONOCYTES # BLD AUTO: 1 K/UL — HIGH (ref 0–0.9)
MONOCYTES # BLD AUTO: 1 K/UL — HIGH (ref 0–0.9)
MONOCYTES # BLD AUTO: 1.1 K/UL — HIGH (ref 0–0.9)
MONOCYTES NFR BLD AUTO: 7.2 % — SIGNIFICANT CHANGE UP (ref 2–14)
MONOCYTES NFR BLD AUTO: 8 % — SIGNIFICANT CHANGE UP (ref 2–14)
MONOCYTES NFR BLD AUTO: 8 % — SIGNIFICANT CHANGE UP (ref 2–14)
NEUTROPHILS # BLD AUTO: 10.4 K/UL — HIGH (ref 1.8–7.4)
NEUTROPHILS # BLD AUTO: 10.5 K/UL — HIGH (ref 1.8–7.4)
NEUTROPHILS # BLD AUTO: 12.3 K/UL — HIGH (ref 1.8–7.4)
NEUTROPHILS NFR BLD AUTO: 82.1 % — HIGH (ref 43–77)
NEUTROPHILS NFR BLD AUTO: 82.6 % — HIGH (ref 43–77)
NEUTROPHILS NFR BLD AUTO: 84.4 % — HIGH (ref 43–77)
PHOSPHATE SERPL-MCNC: 6.9 MG/DL — HIGH (ref 2.5–4.5)
PLATELET # BLD AUTO: 145 K/UL — LOW (ref 150–400)
PLATELET # BLD AUTO: 149 K/UL — LOW (ref 150–400)
PLATELET # BLD AUTO: 159 K/UL — SIGNIFICANT CHANGE UP (ref 150–400)
POTASSIUM SERPL-MCNC: 4 MMOL/L — SIGNIFICANT CHANGE UP (ref 3.5–5.3)
POTASSIUM SERPL-SCNC: 4 MMOL/L — SIGNIFICANT CHANGE UP (ref 3.5–5.3)
RBC # BLD: 2.45 M/UL — LOW (ref 4.2–5.8)
RBC # BLD: 2.5 M/UL — LOW (ref 4.2–5.8)
RBC # BLD: 2.61 M/UL — LOW (ref 4.2–5.8)
RBC # FLD: 13.1 % — SIGNIFICANT CHANGE UP (ref 10.3–14.5)
RBC # FLD: 13.2 % — SIGNIFICANT CHANGE UP (ref 10.3–14.5)
RBC # FLD: 13.2 % — SIGNIFICANT CHANGE UP (ref 10.3–14.5)
SODIUM SERPL-SCNC: 132 MMOL/L — LOW (ref 135–145)
WBC # BLD: 12.6 K/UL — HIGH (ref 3.8–10.5)
WBC # BLD: 12.8 K/UL — HIGH (ref 3.8–10.5)
WBC # BLD: 14.6 K/UL — HIGH (ref 3.8–10.5)
WBC # FLD AUTO: 12.6 K/UL — HIGH (ref 3.8–10.5)
WBC # FLD AUTO: 12.8 K/UL — HIGH (ref 3.8–10.5)
WBC # FLD AUTO: 14.6 K/UL — HIGH (ref 3.8–10.5)

## 2017-09-26 PROCEDURE — 93306 TTE W/DOPPLER COMPLETE: CPT | Mod: 26

## 2017-09-26 PROCEDURE — 99291 CRITICAL CARE FIRST HOUR: CPT

## 2017-09-26 PROCEDURE — 71010: CPT | Mod: 26

## 2017-09-26 PROCEDURE — 93010 ELECTROCARDIOGRAM REPORT: CPT

## 2017-09-26 RX ORDER — AMIODARONE HYDROCHLORIDE 400 MG/1
200 TABLET ORAL DAILY
Qty: 0 | Refills: 0 | Status: DISCONTINUED | OUTPATIENT
Start: 2017-10-03 | End: 2017-10-18

## 2017-09-26 RX ORDER — ERYTHROPOIETIN 10000 [IU]/ML
10000 INJECTION, SOLUTION INTRAVENOUS; SUBCUTANEOUS ONCE
Qty: 0 | Refills: 0 | Status: COMPLETED | OUTPATIENT
Start: 2017-09-26 | End: 2017-09-26

## 2017-09-26 RX ORDER — AMIODARONE HYDROCHLORIDE 400 MG/1
400 TABLET ORAL EVERY 8 HOURS
Qty: 0 | Refills: 0 | Status: COMPLETED | OUTPATIENT
Start: 2017-09-27 | End: 2017-10-02

## 2017-09-26 RX ORDER — PANTOPRAZOLE SODIUM 20 MG/1
40 TABLET, DELAYED RELEASE ORAL
Qty: 0 | Refills: 0 | Status: DISCONTINUED | OUTPATIENT
Start: 2017-09-26 | End: 2017-09-28

## 2017-09-26 RX ORDER — AMIODARONE HYDROCHLORIDE 400 MG/1
0.5 TABLET ORAL
Qty: 900 | Refills: 0 | Status: DISCONTINUED | OUTPATIENT
Start: 2017-09-26 | End: 2017-09-27

## 2017-09-26 RX ADMIN — HEPARIN SODIUM 6 UNIT(S)/HR: 5000 INJECTION INTRAVENOUS; SUBCUTANEOUS at 13:15

## 2017-09-26 RX ADMIN — Medication: at 01:30

## 2017-09-26 RX ADMIN — ATORVASTATIN CALCIUM 40 MILLIGRAM(S): 80 TABLET, FILM COATED ORAL at 22:02

## 2017-09-26 RX ADMIN — Medication 1: at 06:09

## 2017-09-26 RX ADMIN — ERYTHROPOIETIN 10000 UNIT(S): 10000 INJECTION, SOLUTION INTRAVENOUS; SUBCUTANEOUS at 17:13

## 2017-09-26 RX ADMIN — Medication 5 MILLIGRAM(S): at 22:02

## 2017-09-26 RX ADMIN — CLOPIDOGREL BISULFATE 75 MILLIGRAM(S): 75 TABLET, FILM COATED ORAL at 22:02

## 2017-09-26 RX ADMIN — AMIODARONE HYDROCHLORIDE 16.67 MG/MIN: 400 TABLET ORAL at 03:28

## 2017-09-26 RX ADMIN — HEPARIN SODIUM 6 UNIT(S)/HR: 5000 INJECTION INTRAVENOUS; SUBCUTANEOUS at 06:09

## 2017-09-26 RX ADMIN — Medication 25 MILLIGRAM(S): at 22:02

## 2017-09-26 RX ADMIN — TAMSULOSIN HYDROCHLORIDE 0.4 MILLIGRAM(S): 0.4 CAPSULE ORAL at 22:02

## 2017-09-26 RX ADMIN — Medication 81 MILLIGRAM(S): at 22:02

## 2017-09-26 NOTE — PHYSICAL THERAPY INITIAL EVALUATION ADULT - LIVES WITH, PROFILE
spouse/as per the  , patient lives in private house with spouse and grandchildren, has 8 hour aide/ 5 days a week ,speaks [place] African , has rolling walker , unsure reg steps present ?? as per the  , patient lives in private house with spouse and grandchildren, has 8 hour aide/ 5 days a week ,speaks Macedonian , has rolling walker , unsure reg steps present ?? 9/28- As per conversation with pt's daughter Anisa [phone] Pt lives alone in apartment with elevator, daughter lives in the same building. Pt ambulates independently with no assistive device, has a rolling walker at home. Pt has [facility] 8 hrs/day (for cleaning, food prep, medications, etc.), grandson stays with him in the evenings, and pt is alone at night./spouse

## 2017-09-26 NOTE — PHYSICAL THERAPY INITIAL EVALUATION ADULT - PASSIVE RANGE OF MOTION EXAMINATION, REHAB EVAL
no Passive ROM deficits were identified/Left UE Passive ROM was WNL (within normal limits)/Right UE Passive ROM was WNL (within normal limits)

## 2017-09-26 NOTE — PHYSICAL THERAPY INITIAL EVALUATION ADULT - PERTINENT HX OF CURRENT PROBLEM, REHAB EVAL
73 yo male  who is Pakistani speaking male with PMH CAD s/p 12-14  stents placed [id number], T2 DM. 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

## 2017-09-26 NOTE — PROGRESS NOTE ADULT - ATTENDING COMMENTS
Patient presented with NSTEMI, now status post PCI, hospital course further complicated by subsequent cardiac arrest.  Await EP evaluation for ICD vs. LifeVest.    Continue dual antiplatelet therapy, high intensity statin, and beta-blocker.  Hold ACEi/ARB in setting of renal insufficiency.

## 2017-09-26 NOTE — PHYSICAL THERAPY INITIAL EVALUATION ADULT - FOLLOWS COMMANDS/ANSWERS QUESTIONS, REHAB EVAL
unsure if aphasic ( expressive ), unable to respond to any commands inspite of interpretor phone, only understands when name called

## 2017-09-26 NOTE — PHYSICAL THERAPY INITIAL EVALUATION ADULT - RANGE OF MOTION EXAMINATION, REHAB EVAL
unable to assess any arom , unable to follow commands, except right upper hemiplegic secondary to recent stroke

## 2017-09-26 NOTE — PHYSICAL THERAPY INITIAL EVALUATION ADULT - IMPAIRED TRANSFERS: SIT/STAND, REHAB EVAL
impaired balance/decreased strength/abnormal muscle tone/impaired motor control/impaired postural control

## 2017-09-26 NOTE — PROGRESS NOTE ADULT - SUBJECTIVE AND OBJECTIVE BOX
Patient is a 72y old  Male who presents with a chief complaint of s/p cardiac cath (22 Sep 2017 12:34)      SUBJECTIVE / OVERNIGHT EVENTS:    MEDICATIONS  (STANDING):  tamsulosin 0.4 milliGRAM(s) Oral at bedtime  clopidogrel Tablet 75 milliGRAM(s) Oral daily  busPIRone 5 milliGRAM(s) Oral two times a day  pantoprazole  Injectable 40 milliGRAM(s) IV Push daily  atorvastatin 40 milliGRAM(s) Oral at bedtime  aspirin  chewable 81 milliGRAM(s) Oral daily  insulin lispro (HumaLOG) corrective regimen sliding scale   SubCutaneous every 6 hours  meropenem IVPB 500 milliGRAM(s) IV Intermittent every 24 hours  metoprolol 25 milliGRAM(s) Enteral Tube two times a day  heparin  Infusion 600 Unit(s)/Hr (6 mL/Hr) IV Continuous <Continuous>  amiodarone Infusion 0.5 mG/Min (16.667 mL/Hr) IV Continuous <Continuous>    MEDICATIONS  (PRN):        CAPILLARY BLOOD GLUCOSE  158 (26 Sep 2017 06:00)  179 (26 Sep 2017 01:00)  182 (25 Sep 2017 12:00)  189 (25 Sep 2017 09:51)        I&O's Summary    25 Sep 2017 07:01  -  26 Sep 2017 07:00  --------------------------------------------------------  IN: 1764.8 mL / OUT: 1697 mL / NET: 67.8 mL        PHYSICAL EXAM:  GENERAL: NAD, well-developed  HEAD:  Atraumatic, Normocephalic  EYES: EOMI, PERRLA, conjunctiva and sclera clear  NECK: Supple, No JVD  CHEST/LUNG: Clear to auscultation bilaterally; No wheeze  HEART: Regular rate and rhythm; No murmurs, rubs, or gallops  ABDOMEN: Soft, Nontender, Nondistended; Bowel sounds present  EXTREMITIES:  2+ Peripheral Pulses, No clubbing, cyanosis, or edema  PSYCH: AAOx3  NEUROLOGY: non-focal  SKIN: No rashes or lesions    LABS:                        7.7    12.8  )-----------( 149      ( 26 Sep 2017 06:09 )             22.5     09-26    132<L>  |  93<L>  |  49<H>  ----------------------------<  158<H>  4.0   |  22  |  5.91<H>    Ca    9.1      26 Sep 2017 05:18  Phos  6.9     09-26  Mg     2.9     09-26    TPro  6.4  /  Alb  2.5<L>  /  TBili  0.7  /  DBili  x   /  AST  77<H>  /  ALT  54<H>  /  AlkPhos  114  09-25    PTT - ( 26 Sep 2017 05:18 )  PTT:57.3 sec    RADIOLOGY & ADDITIONAL TESTS:    TTE (9/22): Endocardial visualization enhanced with intravenous injection of echo contrast (Definity). Severe segmental left ventricular systolic dysfunction. EF approximately 20%. The inferior, anterior, septal, and apical cap are akinetic. The lateral and inferolateral walls are the best preserved. No left ventricular thrombus. The right ventricle is not well visualized; grossly normal right ventricular systolic function. No pericardial effusion seen.    Head CT (9/23): Multiple areas of hypodensities within the bilateral cerebral and cerebellar hemispheres concerning for embolic infarcts.     CT A+P (9/23): No retroperitoneal hemorrhage. Slightly decreased in size of the left hematoma superficial to the left femoral artery and a decrease in the subcutaneous fat stranding in the left inguinal region. The right hydronephrosis and hydroureter have resolved. The calculi at the right UVJ have migrated into the bladder. No change in the additional, bilateral nonobstructing renal calculi. Multiple bladder calculi.: Patient is a 72y old  Male who presents with a chief complaint of s/p cardiac cath (22 Sep 2017 12:34)    SUBJECTIVE / OVERNIGHT EVENTS:  Patient extubated successfully yesterday. No acute events overnight. Patient is Slovak-speaking, and so history this AM was obtained with the help of a  (#[id number]). Patient did not verbally respond to questions. He was able to follow commands with his LUE and was seen to move his proximal LLE. No movement was seen on the right side. Pt did not express that he was in pain. Patient appeared comfortable and was breathing comfortably on room air.     MEDICATIONS  (STANDING):  tamsulosin 0.4 milliGRAM(s) Oral at bedtime  clopidogrel Tablet 75 milliGRAM(s) Oral daily  busPIRone 5 milliGRAM(s) Oral two times a day  pantoprazole  Injectable 40 milliGRAM(s) IV Push daily  atorvastatin 40 milliGRAM(s) Oral at bedtime  aspirin  chewable 81 milliGRAM(s) Oral daily  insulin lispro (HumaLOG) corrective regimen sliding scale   SubCutaneous every 6 hours  meropenem IVPB 500 milliGRAM(s) IV Intermittent every 24 hours  metoprolol 25 milliGRAM(s) Enteral Tube two times a day  heparin  Infusion 600 Unit(s)/Hr (6 mL/Hr) IV Continuous <Continuous>  amiodarone Infusion 0.5 mG/Min (16.667 mL/Hr) IV Continuous <Continuous>    MEDICATIONS  (PRN):      CAPILLARY BLOOD GLUCOSE  158 (26 Sep 2017 06:00)  179 (26 Sep 2017 01:00)  182 (25 Sep 2017 12:00)  189 (25 Sep 2017 09:51)    I&O's Summary    25 Sep 2017 07:01  -  26 Sep 2017 07:00  --------------------------------------------------------  IN: 1764.8 mL / OUT: 1697 mL / NET: 67.8 mL    PHYSICAL EXAM:  GENERAL: NAD  HEAD: NC/AT  NECK: Supple  CHEST/LUNG: Grossly CTAB anteriorly, No wheeze appreciated  HEART: Irregular rhythm, Normal rate  ABDOMEN: +BS, Soft, Nontender, No rigidity  EXTREMITIES: No significant peripheral edema  PSYCH: Calm  NEUROLOGY: PERRLA, Moves LUE on command, Able to move proximal LLE, No movement seen of RUE/RLE  SKIN: Ecchymosis seen on left proximal anterior thigh extending to LLQ     LABS:                        7.7    12.8  )-----------( 149      ( 26 Sep 2017 06:09 )             22.5     09-26    132<L>  |  93<L>  |  49<H>  ----------------------------<  158<H>  4.0   |  22  |  5.91<H>    Ca    9.1      26 Sep 2017 05:18  Phos  6.9     09-26  Mg     2.9     09-26    TPro  6.4  /  Alb  2.5<L>  /  TBili  0.7  /  DBili  x   /  AST  77<H>  /  ALT  54<H>  /  AlkPhos  114  09-25    PTT - ( 26 Sep 2017 05:18 )  PTT:57.3 sec    RADIOLOGY & ADDITIONAL TESTS:    TTE (9/22): Endocardial visualization enhanced with intravenous injection of echo contrast (Definity). Severe segmental left ventricular systolic dysfunction. EF approximately 20%. The inferior, anterior, septal, and apical cap are akinetic. The lateral and inferolateral walls are the best preserved. No left ventricular thrombus. The right ventricle is not well visualized; grossly normal right ventricular systolic function. No pericardial effusion seen.    Head CT (9/23): Multiple areas of hypodensities within the bilateral cerebral and cerebellar hemispheres concerning for embolic infarcts.     CT A+P (9/23): No retroperitoneal hemorrhage. Slightly decreased in size of the left hematoma superficial to the left femoral artery and a decrease in the subcutaneous fat stranding in the left inguinal region. The right hydronephrosis and hydroureter have resolved. The calculi at the right UVJ have migrated into the bladder. No change in the additional, bilateral nonobstructing renal calculi. Multiple bladder calculi.: Patient is a 72y old  Male who presents with a chief complaint of s/p cardiac cath (22 Sep 2017 12:34)    SUBJECTIVE / OVERNIGHT EVENTS:  Patient extubated successfully yesterday. No acute events overnight. Patient is Macanese-speaking, and so history this AM was obtained with the help of a  (#[id number]). Patient did not verbally respond to questions. He was able to follow commands with his LUE and was seen to move his proximal LLE. No movement was seen on the right side. Pt did not express that he was in pain. Patient appeared comfortable and was breathing comfortably on room air.     MEDICATIONS  (STANDING):  tamsulosin 0.4 milliGRAM(s) Oral at bedtime  clopidogrel Tablet 75 milliGRAM(s) Oral daily  busPIRone 5 milliGRAM(s) Oral two times a day  pantoprazole  Injectable 40 milliGRAM(s) IV Push daily  atorvastatin 40 milliGRAM(s) Oral at bedtime  aspirin  chewable 81 milliGRAM(s) Oral daily  insulin lispro (HumaLOG) corrective regimen sliding scale   SubCutaneous every 6 hours  meropenem IVPB 500 milliGRAM(s) IV Intermittent every 24 hours  metoprolol 25 milliGRAM(s) Enteral Tube two times a day  heparin  Infusion 600 Unit(s)/Hr (6 mL/Hr) IV Continuous <Continuous>  amiodarone Infusion 0.5 mG/Min (16.667 mL/Hr) IV Continuous <Continuous>    MEDICATIONS  (PRN):      CAPILLARY BLOOD GLUCOSE  158 (26 Sep 2017 06:00)  179 (26 Sep 2017 01:00)  182 (25 Sep 2017 12:00)  189 (25 Sep 2017 09:51)    I&O's Summary    25 Sep 2017 07:01  -  26 Sep 2017 07:00  --------------------------------------------------------  IN: 1764.8 mL / OUT: 1697 mL / NET: 67.8 mL    PHYSICAL EXAM:  GENERAL: NAD  HEAD: NC/AT  NECK: Supple  CHEST/LUNG: Grossly CTAB anteriorly, No wheeze appreciated  HEART: Irregular rhythm, Normal rate  ABDOMEN: +BS, Soft, Nontender, No rigidity  EXTREMITIES: No significant peripheral edema  PSYCH: Calm  NEUROLOGY: PERRLA, Moves LUE on command, Able to move proximal LLE, No movement seen of RUE/RLE  SKIN: Ecchymosis seen on left proximal anterior thigh extending to LLQ/Left flank which has not worsened compared to yesterday     LABS:                        7.7    12.8  )-----------( 149      ( 26 Sep 2017 06:09 )             22.5     09-26    132<L>  |  93<L>  |  49<H>  ----------------------------<  158<H>  4.0   |  22  |  5.91<H>    Ca    9.1      26 Sep 2017 05:18  Phos  6.9     09-26  Mg     2.9     09-26    TPro  6.4  /  Alb  2.5<L>  /  TBili  0.7  /  DBili  x   /  AST  77<H>  /  ALT  54<H>  /  AlkPhos  114  09-25    PTT - ( 26 Sep 2017 05:18 )  PTT:57.3 sec    RADIOLOGY & ADDITIONAL TESTS:    TTE (9/22): Endocardial visualization enhanced with intravenous injection of echo contrast (Definity). Severe segmental left ventricular systolic dysfunction. EF approximately 20%. The inferior, anterior, septal, and apical cap are akinetic. The lateral and inferolateral walls are the best preserved. No left ventricular thrombus. The right ventricle is not well visualized; grossly normal right ventricular systolic function. No pericardial effusion seen.    Head CT (9/23): Multiple areas of hypodensities within the bilateral cerebral and cerebellar hemispheres concerning for embolic infarcts.     CT A+P (9/23): No retroperitoneal hemorrhage. Slightly decreased in size of the left hematoma superficial to the left femoral artery and a decrease in the subcutaneous fat stranding in the left inguinal region. The right hydronephrosis and hydroureter have resolved. The calculi at the right UVJ have migrated into the bladder. No change in the additional, bilateral nonobstructing renal calculi. Multiple bladder calculi.    LLE U/S (9/25): Small left groin hematoma components, without evidence of pseudoaneurysm.

## 2017-09-26 NOTE — PROGRESS NOTE ADULT - SUBJECTIVE AND OBJECTIVE BOX
THE PATIENT WAS SEEN AND EXAMINED ALBERTO Bishop NP with findings reviewed with Dr. Marshall.   HPI:  This is 71 yo male  who is Mauritian speaking male with PMH CAD s/p stents placed [id number], T2 DM. 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 Patient was admitted  with pyelonephritis started on ceftriaxone. 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 Patient is now s/p james making no urine, undergoing dialysis via femoral catheter . Patient treated in the ICU for  NSTEMI with TWI precordial and lateral leads  with increased troponin I  0.120>0.193 . Patient was also started on Heparin gtt, loaded with ASa and plavix, TTE with EF 35 %. He was intubated with hypoxic respiratory distress from pulm edema from the acute NSTEMI as well as treated for  Hypertensive emergency with NTG gtt.  He was transfered to [facility]  for cardiac angiogram.    Hx was obtained from chart and daughter Anisa Lara  [phone] and entered post procedure   renal is dr charles mosquera [phone]  cardiology dr lord (12 Sep 2017 19:23)      SUBJECTIVE: Called to see patient, non verbal, concern for cerebrovascular etiology.    tamsulosin 0.4 milliGRAM(s) Oral at bedtime  clopidogrel Tablet 75 milliGRAM(s) Oral daily  busPIRone 5 milliGRAM(s) Oral two times a day  atorvastatin 40 milliGRAM(s) Oral at bedtime  aspirin  chewable 81 milliGRAM(s) Oral daily  insulin lispro (HumaLOG) corrective regimen sliding scale   SubCutaneous every 6 hours  metoprolol 25 milliGRAM(s) Enteral Tube two times a day  heparin  Infusion 600 Unit(s)/Hr IV Continuous <Continuous>  amiodarone Infusion 0.5 mG/Min IV Continuous <Continuous>  pantoprazole    Tablet 40 milliGRAM(s) Oral before breakfast  epoetin georgie Injectable 22067 Unit(s) IV Push once      PHYSICAL EXAM:   Vital Signs Last 24 Hrs  T(C): 37.3 (26 Sep 2017 14:00), Max: 37.3 (25 Sep 2017 19:00)  T(F): 99.2 (26 Sep 2017 14:00), Max: 99.2 (26 Sep 2017 14:00)  HR: 80 (26 Sep 2017 16:00) (72 - 108)  BP: --  BP(mean): --  RR: 18 (26 Sep 2017 16:00) (14 - 25)  SpO2: 94% (26 Sep 2017 16:00) (94% - 100%)      Exam is limited as patient is not following commands. Language barrier, Mauritian spearing only, use of [employer]  phone and Mauritian speaking nurse.    General: Well developed, well nourished older male. Resting in bed.  No acute distress. s/p successful extubation yesterday , off sedation.  HEENT: PERRL, EOM intact, able to visually track, no discernible neglect.  Abdomen: Soft, nontender, nondistended. Keeofeed tube insitu, awaiting placement confirmation. Marked dark purple ecchymosis from area below left axilla, down left flank  extending to groin. No extension beyond marking, but areas are firm to touch.   Extremities: No edema    NEUROLOGICAL EXAM: Limited exam despite use of [employer] , Sophia  Mauritian  # [phone] and assist of Mauritian speaking nurse. Patient nods head intermittently to questions. Only one hypophonic "yes" response to question. Concern for aphasia, versus depression in setting of patient with known bipolar disorder on Buspar. No clear differentiation.   Mental status: Awake, alert. Not following commands, shaking head yes or no intermittently and then puts head down, resting chin to chest.   Cranial Nerves: No appreciable facial asymmetry, no nystagmus. Failed dysphagia Keofeed tube in place.  Motor exam: Normal tone, Right sided hemiparesis. Spontaneous movement left side.  Sensation: N/A  Coordination/ Gait: Unable to assess     LABS:                        7.7    12.8  )-----------( 149      ( 26 Sep 2017 06:09 )             22.5    09-26    132<L>  |  93<L>  |  49<H>  ----------------------------<  158<H>  4.0   |  22  |  5.91<H>    Ca    9.1      26 Sep 2017 05:18  Phos  6.9     09-26  Mg     2.9     09-26    TPro  6.4  /  Alb  2.5<L>  /  TBili  0.7  /  DBili  x   /  AST  77<H>  /  ALT  54<H>  /  AlkPhos  114  09-25  PTT - ( 26 Sep 2017 11:30 )  PTT:57.3 sec  Hemoglobin A1C, Whole Blood: 6.5 % (09-13 @ 10:32)      IMAGING:   < from: CT Head No Cont (09.23.17 @ 06:32) >  EXAM:  CT BRAIN                            PROCEDURE DATE:  09/23/2017            INTERPRETATION:  CLINICAL INFORMATION: Right-sided weakness    TECHNIQUE: Noncontrast axial CT images were acquired through the head.   Two-dimensional sagittal and coronal reformats were generated.    COMPARISON STUDY: Brain CT dated 9/20/2017 at 12:58 AM    FINDINGS:       There are multiple areas of hypodensities within bilateral cerebral   hemispheres, as well as the bilateral cerebellar hemispheres. Findings   are concerning for embolic infarcts.    Chronic right occipital, left basal ganglia and right thalamic infarcts   are again noted.    There is no CT evidence of acute intracranial hemorrhage, extra-axial   collection, vasogenic edema, mass effect, midline shift, central   herniation, or hydrocephalus.     There is age-appropriate cerebral volume loss. There is moderate patchy   white matter hypoattenuation which is likely related to chronic   microvascular ischemic disease.    The visualized paranasal sinuses are clear.    The mastoid air cells and middle ear cavities are grossly clear.    The calvarium is intact.    IMPRESSION:   Multiple areas of hypodensities within the bilateral cerebral and   cerebellar hemispheres concerning for embolic infarcts.     Findings were discussed with Dr. Powers by Dr. Chong on 9/23/2017 at   9:15 AM with readback.    < end of copied text >

## 2017-09-26 NOTE — PROGRESS NOTE ADULT - ASSESSMENT
Patient is a 71 y/o M w/ a PMHx of extensive CAD, T2DM, ESRD new HD, prior CVA who was transferred from OSH for NSTEMI s/p impella assisted LHC and pyelonephritis with course c/b obstructive nephropathy requiring HD transferred from floor to CCU after cardiac arrest, v-tach and subsequent ROSC w/ hospital course further c/b embolic CVA (9/23).    # Neuro  - Patient is currently intubated, off sedation. CT head on 9/23 showed new embolic stroke. Mental status is improving. He is following commands this AM.  - Neurology following, appreciate recs. Pt is currently on a Heparin gtt (goal pTT = 50-70).   - C/w ASA and Lipitor.  # CVS  - Patient initially p/w NSTEMI s/p Impella assisted PCI w/ 3 BABAR placed to LAD and 2 BABAR placed to RCA on 9/14/2017. C/w DAPT.  - Hospital course c/b cardiac arrest/V-tach on 9/21 s/p Intubation. Patient also developed new-onset A-fib. He is currently on an Amiodarone gtt. Will plan for a 10g load. Rate has been grossly controlled while on Amio gtt.  - TTE on 9/22 showed severe segmental LV systolic dysfunction (EF ~ 20%).  - As pt's BP has improved, will start Lopressor 25mg PO BID today. If BP remains stable, will plan to start Lisinopril 5mg in the PM as well.  - C/w Lipitor 40mg QHS (continue with current dose while on Amiodarone).   # Resp   - Pt intubated on 9/21 due to acute hypoxic respiratory failure in the setting of cardiac arrest  - Will check an ABG and try to extubate today pending CPAP trials  # GI:   - Patient is NPO with tube feeds. C/w IV Protonix 40mg QD.  # Renal:   - Patient p/w pyelonephritis and nephrolithiasis w/ LIN which progressed to ESRD requiring HD.   - Nephrology following, appreciate recs. Patient is currently with minimal UOP. Plan for HD today.  - Monitor Is/Os      # ID:   - Pt spiked a Temp of 101F on 9/23. Tmax of 100.1F in the last 24 hours. Pt has a leukocytosis which is currently downtrending. Unclear if leukocytosis is 2/2 inflammatory response from recent cardiac arrest.   - Pt is currently on Vancomycin (by-level) (started 9/22) and IV Meropenem 500mg Q24H (started 9/23). Will continue to monitor Vanc trough levels.  - Blood Cx (9/22): NGTD. Unable to obtain a urine sample to assess UA/UCx. No obvious consolidation on CXR. Unclear source for infection at this time.   - Will continue to monitor CBC and fever curve  # Endo  - Patient with T2DM. C/w HISS. Monitor FS  # Heme:  - Hospital course c/b left groin hematoma which was seen to decrease in size on CT A+P on 9/23. Patient does have an area of ecchymosis on his left proximal anterior thigh extending to his LLQ. Will closely monitor for extension of ecchymosis. Patient's Hgb has been around 7-8 recently. Suspect AOCD component given ESRD. Patient is on a Heparin gtt. CT A+P showed no evidence of an RP bleed. No overt signs of bleeding clinically. FOBT was negative. Patient is s/p 4U of pRBCs this admission.   - Will c/w heparin gtt (goal pTT = 50-70). Will monitor CBC closely and maintain and active T+S.  # DVT PPx  - Pt currently on a Heparin gtt Patient is a 71 y/o M w/ a PMHx of extensive CAD, T2DM, ESRD new HD, prior CVA who was transferred from OSH for NSTEMI s/p impella assisted LHC and pyelonephritis with course c/b obstructive nephropathy requiring HD transferred from floor to CCU after cardiac arrest, v-tach and subsequent ROSC w/ hospital course further c/b embolic CVA (9/23).    # Neuro  - CT head on 9/23 showed new embolic stroke. Mental status has improved. Patient is more alert and he is able to follow some commands.  - Neurology following, appreciate recs. Pt is currently on a Heparin gtt (goal pTT = 50-70).   - C/w ASA and Lipitor.  # CVS  - Patient initially p/w NSTEMI s/p Impella assisted PCI w/ 3 BABAR placed to LAD and 2 BABAR placed to RCA on 9/14/2017. C/w DAPT.  - Hospital course c/b cardiac arrest/V-tach on 9/21 s/p Intubation. Patient also developed new-onset A-fib. He is currently on an Amiodarone gtt. Will plan for a 10g load. Rate has been grossly controlled while on Amio gtt.  - TTE on 9/22 showed severe segmental LV systolic dysfunction (EF ~ 20%).  - Patient started on Lopressor 25mg PO BID and Lisinopril 5mg QD yesterday which patient tolerated well. Will c/w Lopressor and Lisinopril.   - C/w Lipitor 40mg QHS (continue with current dose while on Amiodarone).   # Resp   - Pt intubated on 9/21 due to acute hypoxic respiratory failure in the setting of cardiac arrest, s/p extubation on 9/25.  - Patient currently saturating well on RA, breathing comfortably. Will continue to monitor.  # GI:   - Patient is NPO with tube feeds. C/w IV Protonix 40mg QD.  # Renal:   - Patient p/w pyelonephritis and nephrolithiasis w/ LIN which progressed to ESRD requiring HD.   - Nephrology following, appreciate recs. Patient is currently with minimal UOP. Plan for HD today.  - Monitor Is/Os      # ID:   - Pt spiked a Temp of 101F on 9/23. Tmax of 100.1F in the last 24 hours. Pt has a leukocytosis which is currently downtrending. Unclear if leukocytosis is 2/2 inflammatory response from recent cardiac arrest.   - Pt is currently on Vancomycin (by-level) (started 9/22) and IV Meropenem 500mg Q24H (started 9/23). Will continue to monitor Vanc trough levels.  - Blood Cx (9/22): NGTD. Unable to obtain a urine sample to assess UA/UCx. No obvious consolidation on CXR. Unclear source for infection at this time.   - Will continue to monitor CBC and fever curve  # Endo  - Patient with T2DM. C/w HISS. Monitor FS  # Heme:  - Hospital course c/b left groin hematoma which was seen to decrease in size on CT A+P on 9/23. Patient does have an area of ecchymosis on his left proximal anterior thigh extending to his LLQ. Will closely monitor for extension of ecchymosis. Patient's Hgb has been around 7-8 recently. Suspect AOCD component given ESRD. Patient is on a Heparin gtt. CT A+P showed no evidence of an RP bleed. No overt signs of bleeding clinically. FOBT was negative. Patient is s/p 4U of pRBCs this admission.   - Will c/w heparin gtt (goal pTT = 50-70). Will monitor CBC closely and maintain and active T+S.  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    # Neuro  - CT head on 9/23 showed new embolic stroke. Mental status has improved. Patient is more alert and he is able to follow some commands.  - Neurology following, appreciate recs. Pt is currently on a Heparin gtt (goal pTT = 50-70).   - C/w ASA and Lipitor.  # CVS  - Patient initially p/w NSTEMI s/p Impella assisted PCI w/ 3 BABAR placed to LAD and 2 BAABR placed to RCA on 9/14/2017. C/w DAPT.  - Hospital course c/b cardiac arrest/V-tach on 9/21 s/p Intubation. Patient also developed new-onset A-fib. He is currently on an Amiodarone gtt. Will plan for a 10g load. Rate has been grossly controlled while on Amio gtt.  - TTE on 9/22 showed severe segmental LV systolic dysfunction (EF ~ 20%).  - Patient started on Lopressor 25mg PO BID and Lisinopril 5mg QD yesterday which patient tolerated well. Will c/w Lopressor and Lisinopril.   - C/w Lipitor 40mg QHS (continue with current dose while on Amiodarone).   # Resp   - Pt intubated on 9/21 due to acute hypoxic respiratory failure in the setting of cardiac arrest, s/p extubation on 9/25.  - Patient currently saturating well on RA, breathing comfortably. Will continue to monitor.  # GI:   - Given recent CVA and extubation, will keep patient NPO for now pending bedside S+S evaluation.  Pt is currently on PO Protonix 40mg QD.  # Renal:   - Patient p/w pyelonephritis and nephrolithiasis w/ LIN which progressed to ESRD requiring HD.   - Nephrology following, appreciate recs. Patient is currently with minimal UOP. Patient underwent HD yesterday. Plan for another session of HD today.  - Monitor Is/Os      # ID:   - Pt spiked a Temp of 101F on 9/23. Pt afebrile overnight. Pt has a leukocytosis which is currently downtrending. Unclear if leukocytosis is 2/2 inflammatory response from recent cardiac arrest.   - Pt is currently on Vancomycin (by-level) (started 9/22) and IV Meropenem 500mg Q24H (started 9/23). Will continue to monitor Vanc trough levels.  - Blood Cx (9/22): NGTD. Unable to obtain a urine sample to assess UA/UCx. No obvious consolidation on CXR. Unclear source for infection at this time.   - Will continue to monitor CBC and fever curve  # Endo  - Patient with T2DM. C/w HISS. Monitor FS  # Heme:  - Hospital course c/b left groin hematoma which was seen to decrease in size on CT A+P on 9/23. Patient does have an area of ecchymosis on his left proximal anterior thigh extending to his LLQ. Will closely monitor for extension of ecchymosis. Patient's Hgb has been around 7-8 recently. Suspect AOCD component given ESRD. Patient is on a Heparin gtt. CT A+P showed no evidence of an RP bleed. No overt signs of bleeding clinically. FOBT was negative. Patient is s/p 4U of pRBCs this admission.   - Will c/w heparin gtt (goal pTT = 50-70). Will monitor CBC closely and maintain and active T+S.  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    # Neuro  - CT head on 9/23 showed new embolic stroke. Mental status has improved. Patient is more alert and he is able to follow some commands.  - Neurology following, appreciate recs. Pt is currently on a Heparin gtt (goal pTT = 50-70).   - C/w ASA and Lipitor.  # CVS  - Patient initially p/w NSTEMI s/p Impella assisted PCI w/ 3 BABAR placed to LAD and 2 BABAR placed to RCA on 9/14/2017. C/w DAPT.  - Hospital course c/b cardiac arrest/V-tach on 9/21 s/p Intubation. Patient also developed new-onset A-fib. He is currently on an Amiodarone gtt. Will plan for a 10g load. Rate has been grossly controlled while on Amio gtt.  - TTE on 9/22 showed severe segmental LV systolic dysfunction (EF ~ 20%).  - Patient started on Lopressor 25mg PO BID and Lisinopril 5mg QD yesterday which patient tolerated well. Will c/w Lopressor and Lisinopril.   - C/w Lipitor 40mg QHS (continue with current dose while on Amiodarone).   # Resp   - Pt intubated on 9/21 due to acute hypoxic respiratory failure in the setting of cardiac arrest, s/p extubation on 9/25.  - Patient currently saturating well on RA, breathing comfortably. Will continue to monitor.  # GI:   - Given recent CVA and extubation, will keep patient NPO for now pending bedside S+S evaluation.  Pt is currently on PO Protonix 40mg QD.  # Renal:   - Patient p/w pyelonephritis and nephrolithiasis w/ LIN which progressed to ESRD requiring HD.   - Nephrology following, appreciate recs. Patient is currently with minimal UOP. Patient underwent HD yesterday. Plan for another session of HD today.  - Monitor Is/Os      # ID:   - Pt spiked a Temp of 101F on 9/23. Pt afebrile overnight. Pt has a leukocytosis which is currently downtrending. Unclear if leukocytosis is 2/2 inflammatory response from recent cardiac arrest.   - Pt is currently on Vancomycin (by-level) (started 9/22) and IV Meropenem 500mg Q24H (started 9/23). Will continue to monitor Vanc trough levels.  - Blood Cx (9/22): NGTD. Unable to obtain a urine sample to assess UA/UCx. No obvious consolidation on CXR. Unclear source for infection at this time.   - Will continue to monitor CBC and fever curve  # Endo  - Patient with T2DM. C/w HISS. Monitor FS  # Heme:  - Hospital course c/b left groin hematoma which was seen to decrease in size on CT A+P on 9/23. Patient does have an area of ecchymosis on his left proximal anterior thigh extending to his LLQ/left flank which is grossly unchanged. Will closely monitor for extension of ecchymosis. Patient's Hgb has been around 7-8 recently. Suspect AOCD component given ESRD. Patient is on a Heparin gtt. CT A+P showed no evidence of an RP bleed. No overt signs of bleeding clinically. FOBT was negative. Patient is s/p 4U of pRBCs this admission.   - Will c/w heparin gtt (goal pTT = 50-70). Will monitor CBC closely and maintain and active T+S.  # DVT PPx  - Pt currently on a Heparin gtt Patient is a 71 y/o M w/ a PMHx of extensive CAD, T2DM, ESRD new HD, prior CVA who was transferred from OSH for NSTEMI s/p impella assisted LHC and pyelonephritis with course c/b obstructive nephropathy requiring HD transferred from floor to CCU after cardiac arrest, v-tach and subsequent ROSC w/ hospital course further c/b embolic CVA (9/23).    # Neuro  - CT head on 9/23 showed new embolic stroke. Mental status has improved. Patient is more alert and he is able to follow some commands.  - Stroke team following, appreciate recs. Pt is currently on a Heparin gtt (goal pTT = 50-70).   - C/w ASA and Lipitor.  # CVS  - Patient initially p/w NSTEMI s/p Impella assisted PCI w/ 3 BABAR placed to LAD and 2 BABAR placed to RCA on 9/14/2017. C/w DAPT.  - Hospital course c/b cardiac arrest/V-tach on 9/21 s/p Intubation. Patient also developed new-onset A-fib. He is currently on an Amiodarone gtt. Will plan for a 10g load. Rate has been grossly controlled while on Amio gtt.  - TTE on 9/22 showed severe segmental LV systolic dysfunction (EF ~ 20%).  - C/w Lopressor 25mg PO BI. Will hold ACEi/ARB for now per Renal recs.   - C/w Lipitor 40mg QHS (continue with current dose while on Amiodarone).   # Resp   - Pt intubated on 9/21 due to acute hypoxic respiratory failure in the setting of cardiac arrest, s/p extubation on 9/25.  - Patient currently saturating well on RA, breathing comfortably. Will continue to monitor.  # GI:   - Given recent CVA and extubation, will keep patient NPO for now pending bedside S+S evaluation. Will place NGT. Pt is currently on PO Protonix 40mg QD.  # Renal:   - Patient p/w pyelonephritis and nephrolithiasis w/ LIN which progressed to ESRD requiring HD.   - Nephrology following, appreciate recs. Patient is currently with minimal UOP. Patient underwent HD yesterday. Plan for another session of HD today.  - Monitor Is/Os      # ID:   - Pt spiked a Temp of 101F on 9/23. Pt afebrile overnight. Pt has a leukocytosis which is currently downtrending. Leukocytosis may be 2/2 inflammatory response from recent cardiac arrest.   - Pt has been on Vancomycin (by-level) (started 9/22) and IV Meropenem 500mg Q24H (started 9/23).   - Blood Cx (9/22): NGTD. Unable to obtain a urine sample to assess UA/UCx. No obvious consolidation on CXR. As there is no known source for infection at this time, will monitor patient off abx for now. Will plan to resend blood cultures and restart antibiotics if patient becomes febrile.   - Will continue to monitor CBC and fever curve  # Endo  - Patient with T2DM. C/w HISS. Monitor FS  # Heme:  - Hospital course c/b left groin hematoma which was seen to decrease in size on CT A+P on 9/23. Patient does have an area of ecchymosis on his left proximal anterior thigh extending to his LLQ/left flank which is grossly unchanged. Will closely monitor for extension of ecchymosis. Patient's Hgb has been around 7-8 recently. Suspect AOCD component given ESRD. Patient is on a Heparin gtt. CT A+P showed no evidence of an RP bleed. No overt signs of bleeding clinically. FOBT was negative. Patient is s/p 4U of pRBCs this admission.   - Will c/w heparin gtt (goal pTT = 50-70). Will monitor CBC closely and maintain and active T+S.  # DVT PPx  - Pt currently on a Heparin gtt

## 2017-09-26 NOTE — PROGRESS NOTE ADULT - SUBJECTIVE AND OBJECTIVE BOX
Seen in CCU, extubated, awake, alert, non-verbal    Vital Signs Last 24 Hrs  T(C): 36.6 (09-26-17 @ 12:50), Max: 37.3 (09-25-17 @ 19:00)  T(F): 97.9 (09-26-17 @ 12:50), Max: 99.1 (09-25-17 @ 19:00)  HR: 108 (09-26-17 @ 13:00) (72 - 119)  RR: 21 (09-26-17 @ 13:00) (14 - 28)  SpO2: 97% (09-26-17 @ 13:00) (95% - 100%)                                7.7    12.8  )-----------( 149      ( 26 Sep 2017 06:09 )             22.5     26 Sep 2017 05:18    132    |  93     |  49     ----------------------------<  158    4.0     |  22     |  5.91     Ca    9.1        26 Sep 2017 05:18  Phos  6.9       26 Sep 2017 05:18  Mg     2.9       26 Sep 2017 05:18    TPro  6.4    /  Alb  2.5    /  TBili  0.7    /  DBili  x      /  AST  77     /  ALT  54     /  AlkPhos  114    25 Sep 2017 17:42    LIVER FUNCTIONS - ( 25 Sep 2017 17:42 )  Alb: 2.5 g/dL / Pro: 6.4 g/dL / ALK PHOS: 114 U/L / ALT: 54 U/L RC / AST: 77 U/L / GGT: x           General: NAD  Respiratory: b/l air entry, clear anteriorly  Cardiovascular: S1 S2 reg  Gastrointestinal: soft, NT, BS present  Extremities:  no edema    tamsulosin 0.4 milliGRAM(s) Oral at bedtime  clopidogrel Tablet 75 milliGRAM(s) Oral daily  busPIRone 5 milliGRAM(s) Oral two times a day  atorvastatin 40 milliGRAM(s) Oral at bedtime  aspirin  chewable 81 milliGRAM(s) Oral daily  insulin lispro (HumaLOG) corrective regimen sliding scale   SubCutaneous every 6 hours  metoprolol 25 milliGRAM(s) Enteral Tube two times a day  heparin  Infusion 600 Unit(s)/Hr IV Continuous <Continuous>  amiodarone Infusion 0.5 mG/Min IV Continuous <Continuous>  pantoprazole    Tablet 40 milliGRAM(s) Oral before breakfast  epoetin georgie Injectable 52036 Unit(s) IV Push once      Assessment and Plan:   		  CM, NSTEMI, s/p cath, PCI, s/p arrest, intubation  S/p CVA, RHP  Neuro f/u  LIN on CKD in setting of non-fx L kidney and R hydro due to stone plus hemodynamic/dye injury  Started on HD in [facility]  Per most recent CT A/P passed stone and hydro resolved  UO still minimal  Extubated  Will continue HD via temporary RIJ HD cath TTS w/fluid removal as able  Procrit w/HD  While most likely pt will require long term dialysis, at this point would avoid ACE/ARB or other nephrotoxins  D/w CCU team

## 2017-09-26 NOTE — PHYSICAL THERAPY INITIAL EVALUATION ADULT - ADDITIONAL COMMENTS
Pt lives with his wife ,daughter and grandvhildren and has a walker at  home.Pt has an aide 8hrs/5 days/week from [facility].Pt nameshis  daughter:Anisa at [phone] as his  caregiver. PMH : and has had urethral tents in the past who presented to [facility] hospital  with 2 days intractable right flank pain radiating  to the groin with  with chills.. Patient was admitted  with pyelonephritis started on ceftriaxone. 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 Patient is now s/p james making no urine, 2nd dialysis today via femoral   catheter for creatine of  Patient treated in the ICU for  NSTEMI with TWI precordial and lateral leads  with increased troponin I  0.120>0.193 . Patient was also started on Heparin gtt, loaded with ASa and plavix, TTE with EF 35 %. He was intubated with hypoxic respiratory distress from pulm edema from the acute NSTEMI as well as treated for  Hypertensive

## 2017-09-26 NOTE — PHYSICAL THERAPY INITIAL EVALUATION ADULT - IMPAIRMENTS CONTRIBUTING IMPAIRED BED MOBILITY, REHAB EVAL
impaired balance/cognition/impaired motor control/decreased strength/impaired coordination/abnormal muscle tone/decreased ROM

## 2017-09-26 NOTE — CHART NOTE - NSCHARTNOTEFT_GEN_A_CORE
====================  CCU MIDNIGHT ROUNDS  ====================    JIMI BUCHANAN  [id number]    ====================  SUMMARY:  ====================    71 yo M w extensive CAD, T2DM, ESRD new HD, prior CVA who was transferred from OSH for NSTEMI s/p impella assisted C 1wk ago being treated for pyelonephritis with course c/b obstructive nephropathy requiring HD transferred from floor to CCU after cardiac arrest, vtach and subsequent ROSC. c/b a fib RVR, new acute embolic stroke w/ R sided hemiparesis. S/p extubation 9/25     ====================  NEW EVENTS:  ====================        ====================  VITALS (Last 12 hrs):  ====================    T(C): 36.9 (09-26-17 @ 22:00), Max: 37.3 (09-26-17 @ 14:00)  HR: 78 (09-26-17 @ 23:00) (78 - 114)  BP: 107/54 (09-26-17 @ 23:00) (107/54 - 142/85)  BP(mean): 69 (09-26-17 @ 23:00) (69 - 103)  ABP: 140/56 (09-26-17 @ 17:00) (134/50 - 150/60)  ABP(mean): 84 (09-26-17 @ 17:00) (74 - 88)  RR: 11 (09-26-17 @ 23:00) (11 - 26)  SpO2: 96% (09-26-17 @ 23:00) (94% - 99%)  CVP(mm Hg): -2 (09-26-17 @ 23:00) (-4 - 6)    TELEMETRY: a fib     I&O's Summary    25 Sep 2017 07:01  -  26 Sep 2017 07:00  --------------------------------------------------------  IN: 1787.5 mL / OUT: 1697 mL / NET: 90.5 mL    26 Sep 2017 07:01  -  26 Sep 2017 23:05  --------------------------------------------------------  IN: 1083.2 mL / OUT: 2600 mL / NET: -1516.8 mL    ====================  PLAN:  ====================    HEALTH ISSUES - PROBLEM Dx:  Cerebrovascular accident (CVA) due to embolism of other cerebral artery: Cerebrovascular accident (CVA) due to embolism of other cerebral artery  CVA (cerebral vascular accident): CVA (cerebral vascular accident)  Respiratory failure: Respiratory failure  Cardiac arrest with successful resuscitation: Cardiac arrest with successful resuscitation  Atrial fibrillation and flutter: Atrial fibrillation and flutter  Anemia: Anemia  Chronic obstructive pulmonary disease, unspecified COPD type: Chronic obstructive pulmonary disease, unspecified COPD type  Acute kidney injury superimposed on CKD: Acute kidney injury superimposed on CKD  Acute systolic congestive heart failure: Acute systolic congestive heart failure  Need for prophylactic measure: Need for prophylactic measure  Pyelonephritis: Pyelonephritis  Essential hypertension: Essential hypertension  Type 2 diabetes mellitus without complication, unspecified long term insulin use status: Type 2 diabetes mellitus without complication, unspecified long term insulin use status  Dyslipidemia: Dyslipidemia  Hypertension, unspecified type: Hypertension, unspecified type  Coronary artery disease of native artery of native heart with stable angina pectoris: Coronary artery disease of native artery of native heart with stable angina pectoris        HEALTH ISSUES - R/O PROBLEM Dx:      Jenelle Tejada CCU NP   # ====================  CCU MIDNIGHT ROUNDS  ====================    JIMI BUCHANAN  [id number]    ====================  SUMMARY:  ====================    73 yo M w extensive CAD, T2DM, ESRD new HD, prior CVA who was transferred from OSH for NSTEMI s/p impella assisted C 1wk ago being treated for pyelonephritis with course c/b obstructive nephropathy requiring HD transferred from floor to CCU after cardiac arrest, vtach and subsequent ROSC. c/b a fib RVR, new acute embolic stroke w/ R sided hemiparesis. S/p extubation 9/25     ====================  NEW EVENTS:  ====================    failed bedside s&s, KO feed placed. Patient pulled out KO feed, and it was replaced, placement confirmed.     ====================  VITALS (Last 12 hrs):  ====================    T(C): 36.9 (09-26-17 @ 22:00), Max: 37.3 (09-26-17 @ 14:00)  HR: 78 (09-26-17 @ 23:00) (78 - 114)  BP: 107/54 (09-26-17 @ 23:00) (107/54 - 142/85)  BP(mean): 69 (09-26-17 @ 23:00) (69 - 103)  ABP: 140/56 (09-26-17 @ 17:00) (134/50 - 150/60)  ABP(mean): 84 (09-26-17 @ 17:00) (74 - 88)  RR: 11 (09-26-17 @ 23:00) (11 - 26)  SpO2: 96% (09-26-17 @ 23:00) (94% - 99%)  CVP(mm Hg): -2 (09-26-17 @ 23:00) (-4 - 6)    TELEMETRY: a fib     I&O's Summary    25 Sep 2017 07:01  -  26 Sep 2017 07:00  --------------------------------------------------------  IN: 1787.5 mL / OUT: 1697 mL / NET: 90.5 mL    26 Sep 2017 07:01  -  26 Sep 2017 23:05  --------------------------------------------------------  IN: 1083.2 mL / OUT: 2600 mL / NET: -1516.8 mL    ====================  PLAN:  ====================  c/w ASA, plavix, lipitor s/p BABAR x 3 LAD, BABAR x 2 RCA, c/w BB, holding ACE in light of renal function   a fib - c/w heparin gtt, c/w BB  acute stroke - c/w lipitor, ASA, per neuro rec, repeat CTH   failed bedside speech and swallow, KO tube in place, feeds started, if tolerated TF o/n, will start PO amio load to 10 gm for VT   s/p VT arrest - c/w amio 10 gm load for VT, will start PO load in AM if tolerating TF   c/w broad spec ABX, f/u cultures, vanco by level   HD per renal, needs permacath per renal   OT, ST Jenelle Tejada CCU NP   #[id number]

## 2017-09-26 NOTE — PHYSICAL THERAPY INITIAL EVALUATION ADULT - IMPAIRMENTS FOUND, PT EVAL
ROM/aerobic capacity/endurance/arousal, attention, and cognition/muscle strength/gait, locomotion, and balance

## 2017-09-26 NOTE — PHYSICAL THERAPY INITIAL EVALUATION ADULT - MANUAL MUSCLE TESTING RESULTS, REHAB EVAL
unable to assess any arom  secondary to patient unableto follow any commands , on observation  left upper 2 +/5 and left lower 2+/5 right upper and lower 1+/5

## 2017-09-26 NOTE — PHYSICAL THERAPY INITIAL EVALUATION ADULT - CRITERIA FOR SKILLED THERAPEUTIC INTERVENTIONS
predicted duration of therapy intervention/risk reduction/prevention/impairments found/functional limitations in following categories/anticipated discharge recommendation/rehab potential/therapy frequency

## 2017-09-26 NOTE — PROGRESS NOTE ADULT - ASSESSMENT
ASSESSMENT: This is a 72yman CAD s/p stents, T2 DM. HTN HLD, CVA 4 years ago with residual left sided facial weakness, radiculopathy,, COPD, CKD,, bipolar disorder , chronic kidney stones (prior renal stents) presented to [facility] with right flank pain admitted  with pyelonephritis complicated by LIN requiring dialysis. Patient experienced subsequent Acute NSTEMI and  hypoxic respiratory distress from pulm edema as well as treated for  Hypertensive emergency with NTG gtt.  He was transferred to [facility]  for cardiac angiogram with successful revascularization achieved: insertion of  4-5 stents. On 9/23, Patient experienced cardiac arrest with ROSC after 18 minutes. Following arrest patient found to have decreased movement right side, deemed not TPA candidate.  Head  Imaging revealed embolic shower, etiology cardioembolic source likely due to afib. Patient on anticoagulation and intubated for respiratory support. Patient was successfully extubated yesterday, while intubated patient would follow some commands and mimic some gestures during neurological exam and was more interactive> Today  patient is not following commands and is non verbal; concern is for cerebrovascular etiology possible aphasia versus withdrawal due to depression in patient with known bipolar disorder. Utilization of  phone and Moroccan speaking nurse unsuccessful. Patient H&H also dropping, promoting more fatigue, less engagement concern for possible bleeding source.        Findings and recommendations  discussed with CCU resident and Primary nurse.  Recommend Repeat head CT without contrast to rule out  possible ICH, Hemorrhagic conversion.   Recommend  Imaging studies of flank, groin areas for possible bleeding source, given decrease in H&H.   Recommend Psych to evaluate for treatment of  bipolar disorder.      NEURO: Continue close monitoring for neurologic deterioration, permissive HTN, titrate statin to LDL goal less than 70. Physical therapy/OT/Speech eval/treatment.     ANTITHROMBOTIC THERAPY: Currently on full anticoagulation, pending imaging result.    PULMONARY: Protecting airway, saturating well     CARDIOVASCULAR: Continue cardiac monitoring                              SBP goal: <180 Systolic    GASTROINTESTINAL: Ulcer prophylaxis, dysphagia screen       Diet:  pending placement confirmation of Keofeed, start NGT feeding.    RENAL: BUN/Cr 49/ 5.91  on dialysis. Patient is anuric.       HEMATOLOGY: H/H 7.7/25, Platelets 149, 000. Continue to monitor for bleeding.     DVT ppx: Heparin s.c [] LMWH []      Patient currently on full anticoagulation.    ID: afebrile, Increase in WBC, patient is asymptomatic. Continue to monitor.     DISPOSITION: Rehab depending on PT eval once stable and workup is complete      CORE MEASURES:        Admission NIHSS:  Y     TPA: [] YES [x] NO      LDL/HDL: Pending     Depression Screen: Limited due to language barrier, patient to be seen by psych . H/O Bipolar disorder.     Statin Therapy: Y     Dysphagia Screen: [] PASS [x] FAIL     Smoking [] YES [x] NO      Afib [x] YES [] NO     Stroke Education [x] YES [] NO

## 2017-09-27 DIAGNOSIS — R13.10 DYSPHAGIA, UNSPECIFIED: ICD-10-CM

## 2017-09-27 DIAGNOSIS — N18.6 END STAGE RENAL DISEASE: ICD-10-CM

## 2017-09-27 DIAGNOSIS — I21.4 NON-ST ELEVATION (NSTEMI) MYOCARDIAL INFARCTION: ICD-10-CM

## 2017-09-27 DIAGNOSIS — J44.9 CHRONIC OBSTRUCTIVE PULMONARY DISEASE, UNSPECIFIED: ICD-10-CM

## 2017-09-27 DIAGNOSIS — E11.9 TYPE 2 DIABETES MELLITUS WITHOUT COMPLICATIONS: ICD-10-CM

## 2017-09-27 LAB
ALBUMIN SERPL ELPH-MCNC: 2.6 G/DL — LOW (ref 3.3–5)
ALP SERPL-CCNC: 99 U/L — SIGNIFICANT CHANGE UP (ref 40–120)
ALT FLD-CCNC: 40 U/L RC — SIGNIFICANT CHANGE UP (ref 10–45)
ANION GAP SERPL CALC-SCNC: 19 MMOL/L — HIGH (ref 5–17)
APTT BLD: 32.4 SEC — SIGNIFICANT CHANGE UP (ref 27.5–37.4)
APTT BLD: 46.9 SEC — HIGH (ref 27.5–37.4)
APTT BLD: 57 SEC — HIGH (ref 27.5–37.4)
AST SERPL-CCNC: 56 U/L — HIGH (ref 10–40)
BASOPHILS # BLD AUTO: 0 K/UL — SIGNIFICANT CHANGE UP (ref 0–0.2)
BASOPHILS NFR BLD AUTO: 0 % — SIGNIFICANT CHANGE UP (ref 0–2)
BILIRUB SERPL-MCNC: 0.6 MG/DL — SIGNIFICANT CHANGE UP (ref 0.2–1.2)
BUN SERPL-MCNC: 41 MG/DL — HIGH (ref 7–23)
CALCIUM SERPL-MCNC: 8.5 MG/DL — SIGNIFICANT CHANGE UP (ref 8.4–10.5)
CHLORIDE SERPL-SCNC: 94 MMOL/L — LOW (ref 96–108)
CHOLEST SERPL-MCNC: 105 MG/DL — SIGNIFICANT CHANGE UP (ref 10–199)
CO2 SERPL-SCNC: 23 MMOL/L — SIGNIFICANT CHANGE UP (ref 22–31)
CREAT SERPL-MCNC: 4.85 MG/DL — HIGH (ref 0.5–1.3)
CULTURE RESULTS: SIGNIFICANT CHANGE UP
CULTURE RESULTS: SIGNIFICANT CHANGE UP
EOSINOPHIL # BLD AUTO: 0.1 K/UL — SIGNIFICANT CHANGE UP (ref 0–0.5)
EOSINOPHIL NFR BLD AUTO: 0.7 % — SIGNIFICANT CHANGE UP (ref 0–6)
GLUCOSE SERPL-MCNC: 168 MG/DL — HIGH (ref 70–99)
HCT VFR BLD CALC: 23.5 % — LOW (ref 39–50)
HDLC SERPL-MCNC: 35 MG/DL — LOW (ref 40–125)
HGB BLD-MCNC: 8 G/DL — LOW (ref 13–17)
INR BLD: 1.3 RATIO — HIGH (ref 0.88–1.16)
INR BLD: 1.3 RATIO — HIGH (ref 0.88–1.16)
LIPID PNL WITH DIRECT LDL SERPL: 35 MG/DL — SIGNIFICANT CHANGE UP
LYMPHOCYTES # BLD AUTO: 1.2 K/UL — SIGNIFICANT CHANGE UP (ref 1–3.3)
LYMPHOCYTES # BLD AUTO: 13.1 % — SIGNIFICANT CHANGE UP (ref 13–44)
MAGNESIUM SERPL-MCNC: 2.4 MG/DL — SIGNIFICANT CHANGE UP (ref 1.6–2.6)
MCHC RBC-ENTMCNC: 31 PG — SIGNIFICANT CHANGE UP (ref 27–34)
MCHC RBC-ENTMCNC: 33.9 GM/DL — SIGNIFICANT CHANGE UP (ref 32–36)
MCV RBC AUTO: 91.5 FL — SIGNIFICANT CHANGE UP (ref 80–100)
MONOCYTES # BLD AUTO: 0.8 K/UL — SIGNIFICANT CHANGE UP (ref 0–0.9)
MONOCYTES NFR BLD AUTO: 9.5 % — SIGNIFICANT CHANGE UP (ref 2–14)
NEUTROPHILS # BLD AUTO: 6.8 K/UL — SIGNIFICANT CHANGE UP (ref 1.8–7.4)
NEUTROPHILS NFR BLD AUTO: 76.7 % — SIGNIFICANT CHANGE UP (ref 43–77)
PHOSPHATE SERPL-MCNC: 4.4 MG/DL — SIGNIFICANT CHANGE UP (ref 2.5–4.5)
PLATELET # BLD AUTO: 155 K/UL — SIGNIFICANT CHANGE UP (ref 150–400)
POTASSIUM SERPL-MCNC: 4 MMOL/L — SIGNIFICANT CHANGE UP (ref 3.5–5.3)
POTASSIUM SERPL-SCNC: 4 MMOL/L — SIGNIFICANT CHANGE UP (ref 3.5–5.3)
PROT SERPL-MCNC: 6.4 G/DL — SIGNIFICANT CHANGE UP (ref 6–8.3)
PROTHROM AB SERPL-ACNC: 14.1 SEC — HIGH (ref 9.8–12.7)
PROTHROM AB SERPL-ACNC: 14.1 SEC — HIGH (ref 9.8–12.7)
RBC # BLD: 2.57 M/UL — LOW (ref 4.2–5.8)
RBC # FLD: 13.7 % — SIGNIFICANT CHANGE UP (ref 10.3–14.5)
SODIUM SERPL-SCNC: 136 MMOL/L — SIGNIFICANT CHANGE UP (ref 135–145)
SPECIMEN SOURCE: SIGNIFICANT CHANGE UP
SPECIMEN SOURCE: SIGNIFICANT CHANGE UP
TOTAL CHOLESTEROL/HDL RATIO MEASUREMENT: 3 RATIO — LOW (ref 3.4–9.6)
TRIGL SERPL-MCNC: 177 MG/DL — HIGH (ref 10–149)
WBC # BLD: 8.9 K/UL — SIGNIFICANT CHANGE UP (ref 3.8–10.5)
WBC # FLD AUTO: 8.9 K/UL — SIGNIFICANT CHANGE UP (ref 3.8–10.5)

## 2017-09-27 PROCEDURE — 99233 SBSQ HOSP IP/OBS HIGH 50: CPT | Mod: GC

## 2017-09-27 PROCEDURE — 93010 ELECTROCARDIOGRAM REPORT: CPT

## 2017-09-27 PROCEDURE — 99291 CRITICAL CARE FIRST HOUR: CPT

## 2017-09-27 PROCEDURE — 99223 1ST HOSP IP/OBS HIGH 75: CPT

## 2017-09-27 PROCEDURE — 70450 CT HEAD/BRAIN W/O DYE: CPT | Mod: 26

## 2017-09-27 RX ORDER — WARFARIN SODIUM 2.5 MG/1
5 TABLET ORAL ONCE
Qty: 0 | Refills: 0 | Status: COMPLETED | OUTPATIENT
Start: 2017-09-27 | End: 2017-09-27

## 2017-09-27 RX ORDER — HEPARIN SODIUM 5000 [USP'U]/ML
650 INJECTION INTRAVENOUS; SUBCUTANEOUS
Qty: 25000 | Refills: 0 | Status: DISCONTINUED | OUTPATIENT
Start: 2017-09-27 | End: 2017-09-28

## 2017-09-27 RX ADMIN — CLOPIDOGREL BISULFATE 75 MILLIGRAM(S): 75 TABLET, FILM COATED ORAL at 12:33

## 2017-09-27 RX ADMIN — Medication 5 MILLIGRAM(S): at 12:33

## 2017-09-27 RX ADMIN — ATORVASTATIN CALCIUM 40 MILLIGRAM(S): 80 TABLET, FILM COATED ORAL at 21:53

## 2017-09-27 RX ADMIN — HEPARIN SODIUM 6.5 UNIT(S)/HR: 5000 INJECTION INTRAVENOUS; SUBCUTANEOUS at 23:05

## 2017-09-27 RX ADMIN — TAMSULOSIN HYDROCHLORIDE 0.4 MILLIGRAM(S): 0.4 CAPSULE ORAL at 21:53

## 2017-09-27 RX ADMIN — Medication 81 MILLIGRAM(S): at 12:33

## 2017-09-27 RX ADMIN — Medication 2: at 12:33

## 2017-09-27 RX ADMIN — Medication 25 MILLIGRAM(S): at 18:09

## 2017-09-27 RX ADMIN — AMIODARONE HYDROCHLORIDE 400 MILLIGRAM(S): 400 TABLET ORAL at 13:56

## 2017-09-27 RX ADMIN — AMIODARONE HYDROCHLORIDE 400 MILLIGRAM(S): 400 TABLET ORAL at 21:53

## 2017-09-27 RX ADMIN — Medication 25 MILLIGRAM(S): at 05:10

## 2017-09-27 RX ADMIN — Medication 2: at 18:39

## 2017-09-27 RX ADMIN — AMIODARONE HYDROCHLORIDE 400 MILLIGRAM(S): 400 TABLET ORAL at 05:10

## 2017-09-27 RX ADMIN — PANTOPRAZOLE SODIUM 40 MILLIGRAM(S): 20 TABLET, DELAYED RELEASE ORAL at 06:32

## 2017-09-27 RX ADMIN — HEPARIN SODIUM 6.5 UNIT(S)/HR: 5000 INJECTION INTRAVENOUS; SUBCUTANEOUS at 05:55

## 2017-09-27 RX ADMIN — Medication 1: at 06:12

## 2017-09-27 RX ADMIN — WARFARIN SODIUM 5 MILLIGRAM(S): 2.5 TABLET ORAL at 21:53

## 2017-09-27 NOTE — OCCUPATIONAL THERAPY INITIAL EVALUATION ADULT - RANGE OF MOTION EXAMINATION, LOWER EXTREMITY
Right LE Passive ROM was WFL  (within functional limits)/Left LE Active Assistive ROM was WFL (within functional limits)

## 2017-09-27 NOTE — PROGRESS NOTE ADULT - ASSESSMENT
Patient is a 71 y/o M w/ a PMHx of extensive CAD, T2DM, ESRD new HD, prior CVA who was transferred from OSH for NSTEMI s/p impella assisted LHC and pyelonephritis with course c/b obstructive nephropathy requiring HD transferred from floor to CCU after cardiac arrest, v-tach and subsequent ROSC w/ hospital course further c/b embolic CVA (9/23).    # Neuro  - CT head on 9/23 showed new embolic stroke. Mental status has improved. Patient is more alert and he is able to follow some commands.  - Stroke team following, appreciate recs. Pt is currently on a Heparin gtt (goal pTT = 50-70).   - C/w ASA and Lipitor.  # CVS  - Patient initially p/w NSTEMI s/p Impella assisted PCI w/ 3 BABAR placed to LAD and 2 BABAR placed to RCA on 9/14/2017. C/w DAPT.  - Hospital course c/b cardiac arrest/V-tach on 9/21 s/p Intubation. Patient also developed new-onset A-fib. He is currently on an Amiodarone gtt. Will plan for a 10g load. Rate has been grossly controlled while on Amio gtt.  - TTE on 9/22 showed severe segmental LV systolic dysfunction (EF ~ 20%).  - C/w Lopressor 25mg PO BI. Will hold ACEi/ARB for now per Renal recs.   - C/w Lipitor 40mg QHS (continue with current dose while on Amiodarone).   # Resp   - Pt intubated on 9/21 due to acute hypoxic respiratory failure in the setting of cardiac arrest, s/p extubation on 9/25.  - Patient currently saturating well on RA, breathing comfortably. Will continue to monitor.  # GI:   - Given recent CVA and extubation, will keep patient NPO for now pending bedside S+S evaluation. Will place NGT. Pt is currently on PO Protonix 40mg QD.  # Renal:   - Patient p/w pyelonephritis and nephrolithiasis w/ LNI which progressed to ESRD requiring HD.   - Nephrology following, appreciate recs. Patient is currently with minimal UOP. Patient underwent HD yesterday. Plan for another session of HD today.  - Monitor Is/Os      # ID:   - Pt spiked a Temp of 101F on 9/23. Pt afebrile overnight. Pt has a leukocytosis which is currently downtrending. Leukocytosis may be 2/2 inflammatory response from recent cardiac arrest.   - Pt has been on Vancomycin (by-level) (started 9/22) and IV Meropenem 500mg Q24H (started 9/23).   - Blood Cx (9/22): NGTD. Unable to obtain a urine sample to assess UA/UCx. No obvious consolidation on CXR. As there is no known source for infection at this time, will monitor patient off abx for now. Will plan to resend blood cultures and restart antibiotics if patient becomes febrile.   - Will continue to monitor CBC and fever curve  # Endo  - Patient with T2DM. C/w HISS. Monitor FS  # Heme:  - Hospital course c/b left groin hematoma which was seen to decrease in size on CT A+P on 9/23. Patient does have an area of ecchymosis on his left proximal anterior thigh extending to his LLQ/left flank which is grossly unchanged. Will closely monitor for extension of ecchymosis. Patient's Hgb has been around 7-8 recently. Suspect AOCD component given ESRD. Patient is on a Heparin gtt. CT A+P showed no evidence of an RP bleed. No overt signs of bleeding clinically. FOBT was negative. Patient is s/p 4U of pRBCs this admission.   - Will c/w heparin gtt (goal pTT = 50-70). Will monitor CBC closely and maintain and active T+S.  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    # Neuro  - CT head on 9/23 showed new embolic stroke. Mental status has improved. Patient is more alert and he is able to follow some commands.  - Stroke team following, appreciate recs. Will recheck a head CT today. Pt is currently on a Heparin gtt (goal pTT = 50-70). Will start bridge to Coumadin tonight.  - C/w ASA and Lipitor.  # CVS  - Patient initially p/w NSTEMI s/p Impella assisted PCI w/ 3 BABAR placed to LAD and 2 BABAR placed to RCA on 9/14/2017. C/w DAPT.  - Hospital course c/b cardiac arrest/V-tach on 9/21 s/p Intubation. Patient also developed new-onset A-fib. He had been on an Amiodarone gtt which was transitioned to PO Amiodarone through NGT last night. Will plan for a 10g load. Rate has been grossly controlled while on Amiodarone.  - TTE on 9/22 showed severe segmental LV systolic dysfunction (EF ~ 20%). TTE repeated yesterday. Will f/u results.  - EP consulted for ICD evaluation. Will f/u recs.  - C/w Lopressor 25mg PO BI. Will hold ACEi/ARB for now per Renal recs.   - C/w Lipitor 40mg QHS (continue with current dose while on Amiodarone).   # Resp   - Pt intubated on 9/21 due to acute hypoxic respiratory failure in the setting of cardiac arrest, s/p extubation on 9/25.  - Patient currently saturating well on RA, breathing comfortably. Will continue to monitor.  # GI:   - Given recent CVA and extubation, will keep patient NPO for now pending bedside S+S evaluation. NGT currently in place. Pt is currently on PO Protonix 40mg QD.  # Renal:   - Patient p/w pyelonephritis and nephrolithiasis w/ LIN which progressed to ESRD requiring HD.   - Nephrology following, appreciate recs. Patient is currently with minimal UOP. Patient underwent HD yesterday.  - Monitor Is/Os      # ID:   - Pt spiked a Temp of 101F on 9/23. Pt afebrile overnight. Pt had a leukocytosis which resolved this AM. Leukocytosis may be 2/2 inflammatory response from recent cardiac arrest.   - Blood Cx (9/22): NGTD. Unable to obtain a urine sample to assess UA/UCx. No obvious consolidation on CXR.   - Continue to monitor pt off abx. Will plan to resend blood cultures and restart antibiotics if patient becomes febrile.   - Will continue to monitor CBC and fever curve  # Endo  - Patient with T2DM. C/w HISS. Monitor FS  # Heme:  - Hospital course c/b left groin hematoma which was seen to decrease in size on CT A+P on 9/23. Patient does have an area of ecchymosis on his left proximal anterior thigh extending to his LLQ/left flank which is grossly unchanged. Will closely monitor for extension of ecchymosis. Patient's Hgb has been around 7-8 recently. Suspect AOCD component given ESRD. Patient is on a Heparin gtt. CT A+P showed no evidence of an RP bleed. No overt signs of bleeding clinically. FOBT was negative. Patient is s/p 4U of pRBCs this admission.   - Will c/w heparin gtt (goal pTT = 50-70). Plan to transition patient to Coumadin. Will monitor CBC closely and maintain and active T+S.  # DVT PPx  - Pt currently on a Heparin gtt. Plan to bridge to Coumadin tonight.

## 2017-09-27 NOTE — OCCUPATIONAL THERAPY INITIAL EVALUATION ADULT - PERTINENT HX OF CURRENT PROBLEM, REHAB EVAL
73 yo male  who is Welsh speaking male with PMH CAD s/p 12-14  stents placed [id number], T2 DM. HTN HLD, CVA 4 years ago with residual L sided facial weaknes, radiculopathy with herniated disc, COPD, CKD creatine 2 months ago 1.2, bipolar, chronic kidney stones and has had urethral tents in the past who presented to [facility] hospital  with 2 days intractable right flank pain radiating  to the groin with  with chills.

## 2017-09-27 NOTE — PROGRESS NOTE ADULT - PROBLEM SELECTOR PLAN 6
-euvolemic on exam, systolic function worsened s/p NSTEMI/arrest  -per EP, no role for LifeVest. Will discuss with family BiV-ICD, vs PPM vs palliative  -fluid removal by intermittent HD

## 2017-09-27 NOTE — CHART NOTE - NSCHARTNOTEFT_GEN_A_CORE
PT being transferred from CCU to [facility]    HPI:  This is 73 yo male  who is Micronesian speaking male with PMH CAD s/p 12-14  stents placed [id number], T2 DM. HTN HLD, CVA 4 years ago with residual left sided facial weaknes, radiculopathy with herniated disc, COPD, CKD creatine 2 months ago 1.2 , bipolar  ( no meds followed by psychiatry) , chronic kidney stones and has had urethral tents in the past who presented to [facility] hospital  with 2 days intractable right flank pain radiating  to the groin with  with chills.. Patient was admitted  with pyelonephritis started on ceftriaxone. 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 Patient is now s/p james making no urine, 2nd dialysis today via femoral catheter for creatine of . Patient treated in the ICU for  NSTEMI with TWI precordial and lateral leads  with increased troponin I  0.120>0.193 . Patient was also started on Heparin gtt, loaded with ASa and plavix, TTE with EF 35 %. He was intubated with hypoxic respiratory distress from pulm edema from the acute NSTEMI as well as treated for  Hypertensive emergency with NTG gtt.  He was transferred to [facility]  for cardiac angiogram.       He ultimately underwent Impella-assisted PCI to RCA/LAD on 9/14/2017.  Pt was initiated on HD.  He had cardiac arrest on Sept 21, received rosc. Patient had stroke now with R sided weakness.    Patient had another cath with 4 stents placed.  Now total of 19 stents??     For Followup:  -  Patient is currently on a Heparin gtt (goal pTT of 50-70). Bridge to Coumadin to begin tonight. Monitor PT/INR  - C/w PO Amiodarone    - Patient is currently NPO. NGT is in place. Follow up speech swallow.    Primary team to take over care in the Morning - Team 4

## 2017-09-27 NOTE — OCCUPATIONAL THERAPY INITIAL EVALUATION ADULT - MANUAL MUSCLE TESTING RESULTS, REHAB EVAL
grossly assessed due to/RUE/RLE 0/5, L shoulder and elbow 2/5 (difficult to assess with command follow), L digits and wrist 3/5, L hip/knee 2+/5, L ankle 3/5

## 2017-09-27 NOTE — PROGRESS NOTE ADULT - ASSESSMENT
71yo M with PMH of extensive CAD, T2DM, CKD, prior CVA transferred from OSH for NSTEMI s/p Impella-assisted LHC w/ BABAR x5 and obstructive nephropathy requiring HD transferred from CCU after PEA/Vtach arrest with ROSC c/b new embolic CVA.

## 2017-09-27 NOTE — OCCUPATIONAL THERAPY INITIAL EVALUATION ADULT - RANGE OF MOTION EXAMINATION, UPPER EXTREMITY
Left UE Active Assistive ROM was WFL  (within functional limits)/Right UE Passive ROM was WFL  (within functional limits)

## 2017-09-27 NOTE — OCCUPATIONAL THERAPY INITIAL EVALUATION ADULT - NS ASR FOLLOW COMMAND OT EVAL
25% of the time/aphasia/oral apraxia/with verbal, visual and tactile cueing/able to follow single-step instructions

## 2017-09-27 NOTE — PROGRESS NOTE ADULT - PROBLEM SELECTOR PLAN 8
-Hgb A1c = 6.5%  -FS ranging 100-200, given significant debility would not favor tighter control  -c/w HISS/FS q6h while on tube feeds

## 2017-09-27 NOTE — PROGRESS NOTE ADULT - PROBLEM SELECTOR PLAN 2
-c/w PO Amio, load to 10g (completes in 5 days)  -c/w heparin gtt, bridge to coumadin  -daily INR, dose coumadin accordingly  -c/w Tele monitoring  -Cards following

## 2017-09-27 NOTE — PROGRESS NOTE ADULT - PROBLEM SELECTOR PLAN 4
-s/p Impella-assisted Cath with BABAR x5 to LAD/RCA  -c/w DAPT, Statin, and BetaBlocker  -holding ACEI given LIN/CKD, discuss with Renal

## 2017-09-27 NOTE — CONSULT NOTE ADULT - ASSESSMENT
72yoM, hx of CAD (DESx18), CKDm DM2, COPD, CVA (remote, no deficits), Bipolar (not on meds). Presented with flank pain, developed NSTEMI, hypertensive emergency,  TWI. [facility] on 9/14 with BABAR to pRCA.  9/21 had cardiac arrest, tele shows bradycardia, evolving intop VF.  18 minutes CPR, intubated.   9/23 with new CVA dx, new aphasia, weakness. 9/27 EF 20%      VF arrest  - no role for lifevest  - will discuss with family, BiV - ICD, vs pacing only vs palliative.   - EP to continue to follow  - please maintain K>4 , Mg>2  - For any questions or If there are any concerns, please call [phone] during daytime, covered by [id number] after-hours 72yoM, hx of CAD (DESx18), CKDm DM2, COPD, CVA (remote, no deficits), Bipolar (not on meds). Presented with flank pain, developed NSTEMI, hypertensive emergency,  TWI. [facility] on 9/14 with BABAR to pRCA.  9/21 had cardiac arrest, tele shows bradycardia, evolving intop VF.  18 minutes CPR, intubated.   9/23 with new CVA dx, new aphasia, weakness. 9/27 EF 20%      VF arrest  - no role for lifevest  - will discuss with family, pacemaker vs. ICD vs palliative.   - EP to continue to follow  - please maintain K>4 , Mg>2  - For any questions or If there are any concerns, please call [phone] during daytime, covered by [id number] after-hours

## 2017-09-27 NOTE — CHART NOTE - NSCHARTNOTEFT_GEN_A_CORE
CCU Transfer Note    Transfer from: CCU    Transfer to: (  ) Medicine    (x) Telemetry     (   ) RCU        (    ) Palliative         (   ) Stroke Unit          (   ) __________________    Accepting physican:      CCU COURSE:          ASSESSMENT & PLAN:             For Followup:          Vital Signs Last 24 Hrs  T(C): 37.2 (27 Sep 2017 08:00), Max: 97.9 (27 Sep 2017 04:00)  T(F): 98.9 (27 Sep 2017 08:00), Max: 208.2 (27 Sep 2017 04:00)  HR: 78 (27 Sep 2017 14:17) (76 - 114)  BP: 118/58 (27 Sep 2017 12:05) (94/54 - 142/85)  BP(mean): 82 (27 Sep 2017 12:05) (66 - 103)  RR: 20 (27 Sep 2017 14:17) (14 - 26)  SpO2: 100% (27 Sep 2017 14:17) (91% - 100%)  I&O's Summary    26 Sep 2017 07:01  -  27 Sep 2017 07:00  --------------------------------------------------------  IN: 1583.1 mL / OUT: 2600 mL / NET: -1016.9 mL    27 Sep 2017 07:01  -  27 Sep 2017 14:44  --------------------------------------------------------  IN: 202.5 mL / OUT: 0 mL / NET: 202.5 mL        MEDICATIONS  (STANDING):  tamsulosin 0.4 milliGRAM(s) Oral at bedtime  clopidogrel Tablet 75 milliGRAM(s) Oral daily  busPIRone 5 milliGRAM(s) Oral two times a day  atorvastatin 40 milliGRAM(s) Oral at bedtime  aspirin  chewable 81 milliGRAM(s) Oral daily  insulin lispro (HumaLOG) corrective regimen sliding scale   SubCutaneous every 6 hours  metoprolol 25 milliGRAM(s) Enteral Tube two times a day  pantoprazole    Tablet 40 milliGRAM(s) Oral before breakfast  amiodarone    Tablet 400 milliGRAM(s) Oral every 8 hours  heparin  Infusion 650 Unit(s)/Hr (6.5 mL/Hr) IV Continuous <Continuous>    MEDICATIONS  (PRN):        LABS                                            8.0                   Neurophils% (auto):   76.7   (09-27 @ 04:46):    8.9  )-----------(155          Lymphocytes% (auto):  13.1                                          23.5                   Eosinphils% (auto):   0.7      Manual%: Neutrophils x    ; Lymphocytes x    ; Eosinophils x    ; Bands%: x    ; Blasts x                                    136    |  94     |  41                  Calcium: 8.5   / iCa: x      (09-27 @ 04:47)    ----------------------------<  168       Magnesium: 2.4                              4.0     |  23     |  4.85             Phosphorous: 4.4      TPro  6.4    /  Alb  2.6    /  TBili  0.6    /  DBili  x      /  AST  56     /  ALT  40     /  AlkPhos  99     27 Sep 2017 04:47    ( 09-27 @ 12:40 )   PT: x    ;   INR: x      aPTT: 32.4 sec CCU Transfer Note    Transfer from: CCU    Transfer to: (  ) Medicine    (x) Telemetry     (   ) RCU        (    ) Palliative         (   ) Stroke Unit          (   ) __________________    Accepting physican:      CCU COURSE:  Pt is a 73 y/o man with h/o ESRD on HD, CAD, who was transferred to [facility] for [facility] after he was found to have an NSTEMI. At the time, he was being treated for Pyelonephritis. He ultimately underwent Impella-assisted PCI to RCA/LAD on 9/14/2017 without significant acute complication. His hospital course since that time had been notable intermittent dyspnea and difficulty with HD access, confounded by patient refusal to allow HD access catheter to be replaced. Patient underwent HD yesterday. He was later noted to have become unresponsive with low Systolic BP before becoming bradycardic. Shortly after, the patient went into Cardiac Arrest. He was coded for ~ 20 minutes before ROSC, during which and after he was noted to have VT. He was given a bolus of Amiodarone followed by infusion, and up to this time there has not been a recurrence of VT noted. The patient appeared to wake and became combative, attempting to remove ETT, which required sedation. The patient's BP increased to ~ 160s/80s, and Levophed was weaned, though later restarted at a lower dose. In the interim, he was noted to exhibit apparent motor weakness in the RUE/RLE. A Code Stroke was called, and CT Head did not reveal any acute abnormalities. Due recent CPR, among other factors, he was deemed not to be a candidate for tPA. The cause of the patient's decompensation is unclear, but there is suspicion for hypotension leading to poor coronary perfusion followed by arrythmia, as electrolytes/acid-base disorders were not evident on kae-code labs. Will continue to treat supportively for now and keep patient intubated for the time being given recent code. Will continue Amiodarone infusion for now given VT during and post-code.        ASSESSMENT & PLAN:             For Followup:          Vital Signs Last 24 Hrs  T(C): 37.2 (27 Sep 2017 08:00), Max: 97.9 (27 Sep 2017 04:00)  T(F): 98.9 (27 Sep 2017 08:00), Max: 208.2 (27 Sep 2017 04:00)  HR: 78 (27 Sep 2017 14:17) (76 - 114)  BP: 118/58 (27 Sep 2017 12:05) (94/54 - 142/85)  BP(mean): 82 (27 Sep 2017 12:05) (66 - 103)  RR: 20 (27 Sep 2017 14:17) (14 - 26)  SpO2: 100% (27 Sep 2017 14:17) (91% - 100%)  I&O's Summary    26 Sep 2017 07:01  -  27 Sep 2017 07:00  --------------------------------------------------------  IN: 1583.1 mL / OUT: 2600 mL / NET: -1016.9 mL    27 Sep 2017 07:01  -  27 Sep 2017 14:44  --------------------------------------------------------  IN: 202.5 mL / OUT: 0 mL / NET: 202.5 mL        MEDICATIONS  (STANDING):  tamsulosin 0.4 milliGRAM(s) Oral at bedtime  clopidogrel Tablet 75 milliGRAM(s) Oral daily  busPIRone 5 milliGRAM(s) Oral two times a day  atorvastatin 40 milliGRAM(s) Oral at bedtime  aspirin  chewable 81 milliGRAM(s) Oral daily  insulin lispro (HumaLOG) corrective regimen sliding scale   SubCutaneous every 6 hours  metoprolol 25 milliGRAM(s) Enteral Tube two times a day  pantoprazole    Tablet 40 milliGRAM(s) Oral before breakfast  amiodarone    Tablet 400 milliGRAM(s) Oral every 8 hours  heparin  Infusion 650 Unit(s)/Hr (6.5 mL/Hr) IV Continuous <Continuous>    MEDICATIONS  (PRN):        LABS                                            8.0                   Neurophils% (auto):   76.7   (09-27 @ 04:46):    8.9  )-----------(155          Lymphocytes% (auto):  13.1                                          23.5                   Eosinphils% (auto):   0.7      Manual%: Neutrophils x    ; Lymphocytes x    ; Eosinophils x    ; Bands%: x    ; Blasts x                                    136    |  94     |  41                  Calcium: 8.5   / iCa: x      (09-27 @ 04:47)    ----------------------------<  168       Magnesium: 2.4                              4.0     |  23     |  4.85             Phosphorous: 4.4      TPro  6.4    /  Alb  2.6    /  TBili  0.6    /  DBili  x      /  AST  56     /  ALT  40     /  AlkPhos  99     27 Sep 2017 04:47    ( 09-27 @ 12:40 )   PT: x    ;   INR: x      aPTT: 32.4 sec CCU Transfer Note    Transfer from: CCU    Transfer to: (  ) Medicine    (x) Telemetry     (   ) RCU        (    ) Palliative         (   ) Stroke Unit          (   ) __________________    Accepting physican:      CCU COURSE:  Pt is a 73 y/o man with h/o ESRD on HD, CAD, who was transferred to [facility] for C after he was found to have an NSTEMI. At the time, he was being treated for Pyelonephritis. He ultimately underwent Impella-assisted PCI to RCA/LAD on 9/14/2017 without significant acute complication. His hospital course had been notable for intermittent dyspnea and difficulty with HD access, confounded by patient refusal to allow HD access catheter to be replaced. While on the Medicine, shortly after an HD session, pt was noted to have become unresponsive with low Systolic BP before becoming bradycardic. Shortly after, the patient went into cardiac arrest. He was coded for ~ 20 minutes before ROSC, during which and after he was noted to have VT. He was given a bolus of Amiodarone followed by infusion. Patient was also noted to exhibit apparent motor weakness in the RUE/RLE. A Code Stroke was called, and subsequent head CT showed multiple areas of hypodensities within the bilateral cerebral and   cerebellar hemispheres concerning for embolic infarct. Patient was transferred to the CCU for further management after cardiac arrest.    While in the CCU, patient was started on a Heparin gtt given his A-fib and evidence of possible embolic infarcts on head CT. Goal pTT was 50-70 per Neuro's recommendations. Patient was continued on DAPT and Lipitor as well. TTE was obtained on 9/22 which showed severe segmental LV systolic dysfunction (EF ~ 20%). Patient's blood pressure improved and his rate was grossly controlled while on an Amiodarone gtt. Patient's respiratory status also improved and he was able to be extubated on 9/25. Patient has been breathing comfortably and saturating well on RA since that time. After extubation, patient has been seen to move his LUE on command and is able to move his proximal LLE. However, no movement has been seen of his RUE/RLE. Patient has also remained grossly nonverbal since extubation, though he is alert and able to follow some commands. A repeat head CT was obtained on day of transfer which showed no evidence for hemorrhagic transformation. or acute intracranial hemorrhage. In addition, patient has continued to undergo HD per Renal recs. After d/w Renal, pt will likely require a Permacath placement. Patient was started on Lopressor 25mg BID; however, holding starting patient on an ACEi/ARB for now per Renal recs. Patient is currently NPO pending a formal S+S evaluation, which is pending. An NGT is currently in placed. Amiodarone was transitioned to PO and plan is for a 10g load. Patient had a repeat TTE prior to transfer which found an EF of 20% as well. EP was consulted ICD evaluation and are currently following. Plan is to bridge patient from Heparin gtt to Coumadin. Patient is ordered for his first dose of Coumadin on day of transfer. Of note, patient's Hgb has been around 7-8 recently. His hospital course had been c/b a left groin hematoma prior to transferring to the CCU; however, a repeat CT A+P showed a decrease in size of the hematoma as well as no evidence of an RP bleed. Patient has had no overt signs of bleeding clinically. Patient is currently HDS and is medically stable for transfer to telemetry.    For Followup:          Vital Signs Last 24 Hrs  T(C): 37.2 (27 Sep 2017 08:00), Max: 97.9 (27 Sep 2017 04:00)  T(F): 98.9 (27 Sep 2017 08:00), Max: 208.2 (27 Sep 2017 04:00)  HR: 78 (27 Sep 2017 14:17) (76 - 114)  BP: 118/58 (27 Sep 2017 12:05) (94/54 - 142/85)  BP(mean): 82 (27 Sep 2017 12:05) (66 - 103)  RR: 20 (27 Sep 2017 14:17) (14 - 26)  SpO2: 100% (27 Sep 2017 14:17) (91% - 100%)  I&O's Summary    26 Sep 2017 07:01  -  27 Sep 2017 07:00  --------------------------------------------------------  IN: 1583.1 mL / OUT: 2600 mL / NET: -1016.9 mL    27 Sep 2017 07:01  -  27 Sep 2017 14:44  --------------------------------------------------------  IN: 202.5 mL / OUT: 0 mL / NET: 202.5 mL        MEDICATIONS  (STANDING):  tamsulosin 0.4 milliGRAM(s) Oral at bedtime  clopidogrel Tablet 75 milliGRAM(s) Oral daily  busPIRone 5 milliGRAM(s) Oral two times a day  atorvastatin 40 milliGRAM(s) Oral at bedtime  aspirin  chewable 81 milliGRAM(s) Oral daily  insulin lispro (HumaLOG) corrective regimen sliding scale   SubCutaneous every 6 hours  metoprolol 25 milliGRAM(s) Enteral Tube two times a day  pantoprazole    Tablet 40 milliGRAM(s) Oral before breakfast  amiodarone    Tablet 400 milliGRAM(s) Oral every 8 hours  heparin  Infusion 650 Unit(s)/Hr (6.5 mL/Hr) IV Continuous <Continuous>    MEDICATIONS  (PRN):        LABS                                            8.0                   Neurophils% (auto):   76.7   (09-27 @ 04:46):    8.9  )-----------(155          Lymphocytes% (auto):  13.1                                          23.5                   Eosinphils% (auto):   0.7      Manual%: Neutrophils x    ; Lymphocytes x    ; Eosinophils x    ; Bands%: x    ; Blasts x                                    136    |  94     |  41                  Calcium: 8.5   / iCa: x      (09-27 @ 04:47)    ----------------------------<  168       Magnesium: 2.4                              4.0     |  23     |  4.85             Phosphorous: 4.4      TPro  6.4    /  Alb  2.6    /  TBili  0.6    /  DBili  x      /  AST  56     /  ALT  40     /  AlkPhos  99     27 Sep 2017 04:47    ( 09-27 @ 12:40 )   PT: x    ;   INR: x      aPTT: 32.4 sec CCU Transfer Note    Transfer from: CCU    Transfer to: (  ) Medicine    (x) Telemetry     (   ) RCU        (    ) Palliative         (   ) Stroke Unit          (   ) __________________    Accepting physican:      CCU COURSE:  Pt is a 73 y/o man with h/o ESRD on HD, CAD, who was transferred to [facility] for C after he was found to have an NSTEMI. At the time, he was being treated for Pyelonephritis. He ultimately underwent Impella-assisted PCI to RCA/LAD on 9/14/2017 without significant acute complication. His hospital course had been notable for intermittent dyspnea and difficulty with HD access, confounded by patient refusal to allow HD access catheter to be replaced. While on the Medicine, shortly after an HD session, pt was noted to have become unresponsive with low Systolic BP before becoming bradycardic. Shortly after, the patient went into cardiac arrest. He was coded for ~ 20 minutes before ROSC, during which and after he was noted to have VT. He was given a bolus of Amiodarone followed by infusion. Patient was also noted to exhibit apparent motor weakness in the RUE/RLE. A Code Stroke was called, and subsequent head CT showed multiple areas of hypodensities within the bilateral cerebral and   cerebellar hemispheres concerning for embolic infarct. Patient was transferred to the CCU for further management after cardiac arrest.    While in the CCU, patient was started on a Heparin gtt given his A-fib and evidence of possible embolic infarcts on head CT. Goal pTT was 50-70 per Neuro's recommendations. Patient was continued on DAPT and Lipitor as well. TTE was obtained on 9/22 which showed severe segmental LV systolic dysfunction (EF ~ 20%). Patient's blood pressure improved and his rate was grossly controlled while on an Amiodarone gtt. Patient's respiratory status also improved and he was able to be extubated on 9/25. Patient has been breathing comfortably and saturating well on RA since that time. After extubation, patient has been seen to move his LUE on command and is able to move his proximal LLE. However, no movement has been seen of his RUE/RLE. Patient has also remained grossly nonverbal since extubation, though he is alert and able to follow some commands. A repeat head CT was obtained on day of transfer which showed no evidence for hemorrhagic transformation. or acute intracranial hemorrhage. In addition, patient has continued to undergo HD per Renal recs. After d/w Renal, pt will likely require a Permacath placement. Patient was started on Lopressor 25mg BID; however, holding starting patient on an ACEi/ARB for now per Renal recs. Patient is currently NPO pending a formal S+S evaluation, which is pending. An NGT is currently in placed. Amiodarone was transitioned to PO and plan is for a 10g load. Patient had a repeat TTE prior to transfer which found an EF of 20% as well. EP was consulted ICD evaluation and are currently following. Plan is to bridge patient from Heparin gtt to Coumadin. Patient is ordered for his first dose of Coumadin on day of transfer. Of note, patient's Hgb has been around 7-8 recently. His hospital course had been c/b a left groin hematoma prior to transferring to the CCU; however, a repeat CT A+P showed a decrease in size of the hematoma as well as no evidence of an RP bleed. Patient has had no overt signs of bleeding clinically. Patient is currently HDS and is medically stable for transfer to telemetry.    For Followup:  - F/u Cardiology recs. EP is also following for ICD evaluation. C/w ASA, Plavix, Lipitor, and Lopressor. Patient is currently on a Heparin gtt (goal pTT of 50-70). Bridge to Coumadin to begin tonight. Monitor PT/INR  - C/w PO Amiodarone (through NGT). Plan for a 10g load.   - F/u Stroke team recs. Of note, patient with hemiparesis on his right side. He also has been grossly nonverbal since extubation. PT/OT is following.  - Patient is currently NPO pending S+S evaluation which is pending. NGT is in place.  - F/u Renal recs regarding HD. Patient will likely require Permacath placement.   - Patient had spiked a temperature a/w leukocytosis earlier in hospital course. He had been on Vancomycin (by-level) since 9/22 and Meropenem since 9/23 which were discontinued on 9/26. Blood Cx (9/22) was negative. Patient has been afebrile and WBC normalized while off antibiotics. Continue to monitor patient off antibiotics. Restart if patient becomes febrile.    - Trend H/H (Hgb has been stable between 7-8 recently). Patient received 4U of pRBCs this admission (last on 9/24). No overt signs of bleeding currently.    Vital Signs Last 24 Hrs  T(C): 37.2 (27 Sep 2017 08:00), Max: 97.9 (27 Sep 2017 04:00)  T(F): 98.9 (27 Sep 2017 08:00), Max: 208.2 (27 Sep 2017 04:00)  HR: 78 (27 Sep 2017 14:17) (76 - 114)  BP: 118/58 (27 Sep 2017 12:05) (94/54 - 142/85)  BP(mean): 82 (27 Sep 2017 12:05) (66 - 103)  RR: 20 (27 Sep 2017 14:17) (14 - 26)  SpO2: 100% (27 Sep 2017 14:17) (91% - 100%)  I&O's Summary    26 Sep 2017 07:01  -  27 Sep 2017 07:00  --------------------------------------------------------  IN: 1583.1 mL / OUT: 2600 mL / NET: -1016.9 mL    27 Sep 2017 07:01  -  27 Sep 2017 14:44  --------------------------------------------------------  IN: 202.5 mL / OUT: 0 mL / NET: 202.5 mL        MEDICATIONS  (STANDING):  tamsulosin 0.4 milliGRAM(s) Oral at bedtime  clopidogrel Tablet 75 milliGRAM(s) Oral daily  busPIRone 5 milliGRAM(s) Oral two times a day  atorvastatin 40 milliGRAM(s) Oral at bedtime  aspirin  chewable 81 milliGRAM(s) Oral daily  insulin lispro (HumaLOG) corrective regimen sliding scale   SubCutaneous every 6 hours  metoprolol 25 milliGRAM(s) Enteral Tube two times a day  pantoprazole    Tablet 40 milliGRAM(s) Oral before breakfast  amiodarone    Tablet 400 milliGRAM(s) Oral every 8 hours  heparin  Infusion 650 Unit(s)/Hr (6.5 mL/Hr) IV Continuous <Continuous>    MEDICATIONS  (PRN):        LABS                                            8.0                   Neurophils% (auto):   76.7   (09-27 @ 04:46):    8.9  )-----------(155          Lymphocytes% (auto):  13.1                                          23.5                   Eosinphils% (auto):   0.7      Manual%: Neutrophils x    ; Lymphocytes x    ; Eosinophils x    ; Bands%: x    ; Blasts x                                    136    |  94     |  41                  Calcium: 8.5   / iCa: x      (09-27 @ 04:47)    ----------------------------<  168       Magnesium: 2.4                              4.0     |  23     |  4.85             Phosphorous: 4.4      TPro  6.4    /  Alb  2.6    /  TBili  0.6    /  DBili  x      /  AST  56     /  ALT  40     /  AlkPhos  99     27 Sep 2017 04:47    ( 09-27 @ 12:40 )   PT: x    ;   INR: x      aPTT: 32.4 sec CCU Transfer Note    Transfer from: CCU    Transfer to: (  ) Medicine    (x) Telemetry     (   ) RCU        (    ) Palliative         (   ) Stroke Unit          (   ) __________________    Accepting physician: Dr. Destin Ugarte      CCU COURSE:  Pt is a 71 y/o man with h/o ESRD on HD, CAD, who was transferred to [facility] for C after he was found to have an NSTEMI. At the time, he was being treated for Pyelonephritis. He ultimately underwent Impella-assisted PCI to RCA/LAD on 9/14/2017 without significant acute complication. His hospital course had been notable for intermittent dyspnea and difficulty with HD access, confounded by patient refusal to allow HD access catheter to be replaced. While on the Medicine, shortly after an HD session, pt was noted to have become unresponsive with low Systolic BP before becoming bradycardic. Shortly after, the patient went into cardiac arrest. He was coded for ~ 20 minutes before ROSC, during which and after he was noted to have VT. He was given a bolus of Amiodarone followed by infusion. Patient was also noted to exhibit apparent motor weakness in the RUE/RLE. A Code Stroke was called, and subsequent head CT showed multiple areas of hypodensities within the bilateral cerebral and   cerebellar hemispheres concerning for embolic infarct. Patient was transferred to the CCU for further management after cardiac arrest.    While in the CCU, patient was started on a Heparin gtt given his A-fib and evidence of possible embolic infarcts on head CT. Goal pTT was 50-70 per Neuro's recommendations. Patient was continued on DAPT and Lipitor as well. TTE was obtained on 9/22 which showed severe segmental LV systolic dysfunction (EF ~ 20%). Patient's blood pressure improved and his rate was grossly controlled while on an Amiodarone gtt. Patient's respiratory status also improved and he was able to be extubated on 9/25. Patient has been breathing comfortably and saturating well on RA since that time. After extubation, patient has been seen to move his LUE on command and is able to move his proximal LLE. However, no movement has been seen of his RUE/RLE. Patient has also remained grossly nonverbal since extubation, though he is alert and able to follow some commands. A repeat head CT was obtained on day of transfer which showed no evidence for hemorrhagic transformation. or acute intracranial hemorrhage. In addition, patient has continued to undergo HD per Renal recs. After d/w Renal, pt will likely require a Permacath placement. Patient was started on Lopressor 25mg BID; however, holding starting patient on an ACEi/ARB for now per Renal recs. Patient is currently NPO pending a formal S+S evaluation, which is pending. An NGT is currently in placed. Amiodarone was transitioned to PO and plan is for a 10g load. Patient had a repeat TTE prior to transfer which found an EF of 20% as well. EP was consulted ICD evaluation and are currently following. Plan is to bridge patient from Heparin gtt to Coumadin. Patient is ordered for his first dose of Coumadin on day of transfer. Of note, patient's Hgb has been around 7-8 recently. His hospital course had been c/b a left groin hematoma prior to transferring to the CCU; however, a repeat CT A+P showed a decrease in size of the hematoma as well as no evidence of an RP bleed. Patient has had no overt signs of bleeding clinically. Patient is currently HDS and is medically stable for transfer to telemetry.    For Followup:  - F/u Cardiology recs. EP is also following for ICD evaluation. C/w ASA, Plavix, Lipitor, and Lopressor. Patient is currently on a Heparin gtt (goal pTT of 50-70). Bridge to Coumadin to begin tonight. Monitor PT/INR  - C/w PO Amiodarone (through NGT). Plan for a 10g load.   - F/u Stroke team recs. Of note, patient with hemiparesis on his right side. He also has been grossly nonverbal since extubation. PT/OT is following.  - Patient is currently NPO pending S+S evaluation which is pending. NGT is in place.  - F/u Renal recs regarding HD. Patient will likely require Permacath placement.   - Patient had spiked a temperature a/w leukocytosis earlier in hospital course. He had been on Vancomycin (by-level) since 9/22 and Meropenem since 9/23 which were discontinued on 9/26. Blood Cx (9/22) was negative. Patient has been afebrile and WBC normalized while off antibiotics. Continue to monitor patient off antibiotics. Restart if patient becomes febrile.    - Trend H/H (Hgb has been stable between 7-8 recently). Patient received 4U of pRBCs this admission (last on 9/24). No overt signs of bleeding currently.    Vital Signs Last 24 Hrs  T(C): 37.2 (27 Sep 2017 08:00), Max: 97.9 (27 Sep 2017 04:00)  T(F): 98.9 (27 Sep 2017 08:00), Max: 208.2 (27 Sep 2017 04:00)  HR: 78 (27 Sep 2017 14:17) (76 - 114)  BP: 118/58 (27 Sep 2017 12:05) (94/54 - 142/85)  BP(mean): 82 (27 Sep 2017 12:05) (66 - 103)  RR: 20 (27 Sep 2017 14:17) (14 - 26)  SpO2: 100% (27 Sep 2017 14:17) (91% - 100%)  I&O's Summary    26 Sep 2017 07:01  -  27 Sep 2017 07:00  --------------------------------------------------------  IN: 1583.1 mL / OUT: 2600 mL / NET: -1016.9 mL    27 Sep 2017 07:01  -  27 Sep 2017 14:44  --------------------------------------------------------  IN: 202.5 mL / OUT: 0 mL / NET: 202.5 mL        MEDICATIONS  (STANDING):  tamsulosin 0.4 milliGRAM(s) Oral at bedtime  clopidogrel Tablet 75 milliGRAM(s) Oral daily  busPIRone 5 milliGRAM(s) Oral two times a day  atorvastatin 40 milliGRAM(s) Oral at bedtime  aspirin  chewable 81 milliGRAM(s) Oral daily  insulin lispro (HumaLOG) corrective regimen sliding scale   SubCutaneous every 6 hours  metoprolol 25 milliGRAM(s) Enteral Tube two times a day  pantoprazole    Tablet 40 milliGRAM(s) Oral before breakfast  amiodarone    Tablet 400 milliGRAM(s) Oral every 8 hours  heparin  Infusion 650 Unit(s)/Hr (6.5 mL/Hr) IV Continuous <Continuous>    MEDICATIONS  (PRN):        LABS                                            8.0                   Neurophils% (auto):   76.7   (09-27 @ 04:46):    8.9  )-----------(155          Lymphocytes% (auto):  13.1                                          23.5                   Eosinphils% (auto):   0.7      Manual%: Neutrophils x    ; Lymphocytes x    ; Eosinophils x    ; Bands%: x    ; Blasts x                                    136    |  94     |  41                  Calcium: 8.5   / iCa: x      (09-27 @ 04:47)    ----------------------------<  168       Magnesium: 2.4                              4.0     |  23     |  4.85             Phosphorous: 4.4      TPro  6.4    /  Alb  2.6    /  TBili  0.6    /  DBili  x      /  AST  56     /  ALT  40     /  AlkPhos  99     27 Sep 2017 04:47    ( 09-27 @ 12:40 )   PT: x    ;   INR: x      aPTT: 32.4 sec CCU Transfer Note    Transfer from: CCU    Transfer to: (  ) Medicine    (x) Telemetry     (   ) RCU        (    ) Palliative         (   ) Stroke Unit          (   ) __________________    Accepting physician: Dr. Destin Ugarte      CCU COURSE:  Pt is a 73 y/o man with h/o ESRD on HD, CAD, who was transferred to [facility] for C after he was found to have an NSTEMI. At the time, he was being treated for Pyelonephritis. He ultimately underwent Impella-assisted PCI to RCA/LAD on 9/14/2017 without significant acute complication. His hospital course had been notable for intermittent dyspnea and difficulty with HD access, confounded by patient refusal to allow HD access catheter to be replaced. While on the Medicine floor, shortly after an HD session, pt was noted to become unresponsive with low systolic BP before becoming bradycardic. Shortly after, the patient went into cardiac arrest. He was coded for ~ 20 minutes before ROSC, during which and after he was noted to have VT. He was given a bolus of Amiodarone followed by infusion. Patient was also noted to exhibit apparent motor weakness in the RUE/RLE. A Code Stroke was called, and a subsequent head CT showed multiple areas of hypodensities within the bilateral cerebral and   cerebellar hemispheres concerning for embolic infarct. Patient was transferred to the CCU for further management after cardiac arrest.    While in the CCU, patient was started on a Heparin gtt given his A-fib and evidence of possible embolic infarcts on head CT. Goal pTT was 50-70 per Neuro's recommendations. Patient was continued on DAPT and Lipitor as well. TTE was obtained on 9/22 which showed severe segmental LV systolic dysfunction (EF ~ 20%). Patient's blood pressure improved and his rate was grossly controlled while on an Amiodarone gtt. Patient's respiratory status also improved and he was able to be extubated on 9/25. Patient has been breathing comfortably and saturating well on RA since that time. After extubation, patient has been seen to move his LUE on command and is able to move his proximal LLE. However, no movement has been seen of his RUE/RLE. Patient has also remained grossly nonverbal since extubation, though he is alert and able to follow some commands. A repeat head CT was obtained on day of transfer which showed no evidence for hemorrhagic transformation. or acute intracranial hemorrhage. In addition, patient has continued to undergo HD per Renal recs. After d/w Renal, pt will likely require a Permacath placement. Patient was started on Lopressor 25mg BID; however, starting patient on an ACEi/ARB has been held for now per Renal recs. Patient is currently NPO pending a formal S+S evaluation, which is pending. An NGT is currently in place. Amiodarone was transitioned to PO and plan is for a 10g load. Patient had a repeat TTE prior to transfer which found an EF of 20% as well. EP was consulted for an ICD evaluation and are currently following. Plan is to bridge patient from Heparin gtt to Coumadin. Patient is ordered for his first dose of Coumadin on night of transfer. Of note, patient's Hgb has been around 7-8 recently. His hospital course had been c/b a left groin hematoma prior to transferring to the CCU; however, a repeat CT A+P showed a decrease in size of the hematoma as well as no evidence of an RP bleed. Patient has had no overt signs of bleeding clinically. Patient is currently HDS and is medically stable for transfer to telemetry.    For Followup:  - F/u Cardiology recs. EP is also following for ICD evaluation. C/w ASA, Plavix, Lipitor, and Lopressor. Patient is currently on a Heparin gtt (goal pTT of 50-70). Bridge to Coumadin to begin tonight. Monitor PT/INR  - C/w PO Amiodarone (through NGT). Plan for a 10g load.   - F/u Stroke team recs. Of note, patient with hemiparesis on his right side. He also has been grossly nonverbal since extubation. PT/OT is following.  - Patient is currently NPO pending S+S evaluation which is pending. NGT is in place.  - F/u Renal recs regarding HD. Patient will likely require Permacath placement.   - Patient had spiked a temperature a/w leukocytosis earlier in hospital course. He had been on Vancomycin (by-level) since 9/22 and Meropenem since 9/23 which were discontinued on 9/26. Blood Cx (9/22) was negative. Patient has been afebrile and WBC normalized while off antibiotics. Continue to monitor patient off antibiotics. Restart if patient becomes febrile.    - Trend H/H (Hgb has been stable between 7-8 recently). Patient received 4U of pRBCs this admission (last on 9/24). No overt signs of bleeding currently.    Vital Signs Last 24 Hrs  T(C): 37.2 (27 Sep 2017 08:00), Max: 97.9 (27 Sep 2017 04:00)  T(F): 98.9 (27 Sep 2017 08:00), Max: 208.2 (27 Sep 2017 04:00)  HR: 78 (27 Sep 2017 14:17) (76 - 114)  BP: 118/58 (27 Sep 2017 12:05) (94/54 - 142/85)  BP(mean): 82 (27 Sep 2017 12:05) (66 - 103)  RR: 20 (27 Sep 2017 14:17) (14 - 26)  SpO2: 100% (27 Sep 2017 14:17) (91% - 100%)  I&O's Summary    26 Sep 2017 07:01  -  27 Sep 2017 07:00  --------------------------------------------------------  IN: 1583.1 mL / OUT: 2600 mL / NET: -1016.9 mL    27 Sep 2017 07:01  -  27 Sep 2017 14:44  --------------------------------------------------------  IN: 202.5 mL / OUT: 0 mL / NET: 202.5 mL        MEDICATIONS  (STANDING):  tamsulosin 0.4 milliGRAM(s) Oral at bedtime  clopidogrel Tablet 75 milliGRAM(s) Oral daily  busPIRone 5 milliGRAM(s) Oral two times a day  atorvastatin 40 milliGRAM(s) Oral at bedtime  aspirin  chewable 81 milliGRAM(s) Oral daily  insulin lispro (HumaLOG) corrective regimen sliding scale   SubCutaneous every 6 hours  metoprolol 25 milliGRAM(s) Enteral Tube two times a day  pantoprazole    Tablet 40 milliGRAM(s) Oral before breakfast  amiodarone    Tablet 400 milliGRAM(s) Oral every 8 hours  heparin  Infusion 650 Unit(s)/Hr (6.5 mL/Hr) IV Continuous <Continuous>    MEDICATIONS  (PRN):        LABS                                            8.0                   Neurophils% (auto):   76.7   (09-27 @ 04:46):    8.9  )-----------(155          Lymphocytes% (auto):  13.1                                          23.5                   Eosinphils% (auto):   0.7      Manual%: Neutrophils x    ; Lymphocytes x    ; Eosinophils x    ; Bands%: x    ; Blasts x                                    136    |  94     |  41                  Calcium: 8.5   / iCa: x      (09-27 @ 04:47)    ----------------------------<  168       Magnesium: 2.4                              4.0     |  23     |  4.85             Phosphorous: 4.4      TPro  6.4    /  Alb  2.6    /  TBili  0.6    /  DBili  x      /  AST  56     /  ALT  40     /  AlkPhos  99     27 Sep 2017 04:47    ( 09-27 @ 12:40 )   PT: x    ;   INR: x      aPTT: 32.4 sec

## 2017-09-27 NOTE — CONSULT NOTE ADULT - SUBJECTIVE AND OBJECTIVE BOX
HISTORY OF PRESENT ILLNESS:   72yoM, hx of CAD (DESx18), CKDm DM2, COPD, CVA (remote, no deficits), Bipolar (not on meds).    He initially presented to an OSH for flank pain. His CE were found to be elevated and he was transferred for [facility], and had PCI to RCA. 9/14.   9/21 had cardiac arrest, tele shows bradycardia, evolving intop VF.  18 minutes CPR, intubated.   9/23 with new CVA dx, new aphasia, weakness. 9/27 EF 20%    Allergies    No Known Allergies    Intolerances    	    MEDICATIONS:  tamsulosin 0.4 milliGRAM(s) Oral at bedtime  clopidogrel Tablet 75 milliGRAM(s) Oral daily  aspirin  chewable 81 milliGRAM(s) Oral daily  metoprolol 25 milliGRAM(s) Enteral Tube two times a day  amiodarone    Tablet 400 milliGRAM(s) Oral every 8 hours  heparin  Infusion 650 Unit(s)/Hr IV Continuous <Continuous>        busPIRone 5 milliGRAM(s) Oral two times a day    pantoprazole    Tablet 40 milliGRAM(s) Oral before breakfast    atorvastatin 40 milliGRAM(s) Oral at bedtime  insulin lispro (HumaLOG) corrective regimen sliding scale   SubCutaneous every 6 hours        PAST MEDICAL & SURGICAL HISTORY:  CVA (cerebral vascular accident)  COPD (chronic obstructive pulmonary disease)  BPH (benign prostatic hyperplasia)  Bipolar affective disorder  CKD (chronic kidney disease)  Pancreatitis  Hypertension  Dyslipidemia  Diabetes mellitus  Coronary artery disease  History of cardiac catheterization      FAMILY HISTORY:      SOCIAL HISTORY:    [ x] Non-smoker  [ ] Smoker  [ ] Alcohol      REVIEW OF SYSTEMS: unable due to aphasia  All others negative except as stated above and in HPI.    PHYSICAL EXAM:  T(C): 37.2 (09-27-17 @ 08:00), Max: 97.9 (09-27-17 @ 04:00)  HR: 84 (09-27-17 @ 12:05) (76 - 114)  BP: 118/58 (09-27-17 @ 12:05) (94/54 - 142/85)  RR: 14 (09-27-17 @ 12:05) (14 - 26)  SpO2: 97% (09-27-17 @ 12:05) (91% - 99%)  Wt(kg): --  I&O's Summary    26 Sep 2017 07:01  -  27 Sep 2017 07:00  --------------------------------------------------------  IN: 1583.1 mL / OUT: 2600 mL / NET: -1016.9 mL    27 Sep 2017 07:01  -  27 Sep 2017 14:01  --------------------------------------------------------  IN: 202.5 mL / OUT: 0 mL / NET: 202.5 mL        Appearance: Normal	  HEENT:   Normal oral mucosa, PERRL, EOMI	  Lymphatic: No lymphadenopathy  Cardiovascular: Normal S1 S2, No JVD, No murmurs, No edema  Respiratory: Lungs clear to auscultation	  Psychiatry: A & O x 3, Mood & affect appropriate  Gastrointestinal:  Soft, Non-tender, + BS	  Skin: No rashes, No ecchymoses, No cyanosis	  Neurologic: Non-focal  Extremities: Normal range of motion, No clubbing, cyanosis or edema  Vascular: Peripheral pulses palpable 2+ bilaterally        LABS:	 	    CBC Full  -  ( 27 Sep 2017 04:46 )  WBC Count : 8.9 K/uL  Hemoglobin : 8.0 g/dL  Hematocrit : 23.5 %  Platelet Count - Automated : 155 K/uL  Mean Cell Volume : 91.5 fl  Mean Cell Hemoglobin : 31.0 pg  Mean Cell Hemoglobin Concentration : 33.9 gm/dL  Auto Neutrophil # : 6.8 K/uL  Auto Lymphocyte # : 1.2 K/uL  Auto Monocyte # : 0.8 K/uL  Auto Eosinophil # : 0.1 K/uL  Auto Basophil # : 0.0 K/uL  Auto Neutrophil % : 76.7 %  Auto Lymphocyte % : 13.1 %  Auto Monocyte % : 9.5 %  Auto Eosinophil % : 0.7 %  Auto Basophil % : 0.0 %    09-27    136  |  94<L>  |  41<H>  ----------------------------<  168<H>  4.0   |  23  |  4.85<H>  09-26    132<L>  |  93<L>  |  49<H>  ----------------------------<  158<H>  4.0   |  22  |  5.91<H>    Ca    8.5      27 Sep 2017 04:47  Ca    9.1      26 Sep 2017 05:18  Phos  4.4     09-27  Phos  6.9     09-26  Mg     2.4     09-27  Mg     2.9     09-26    TPro  6.4  /  Alb  2.6<L>  /  TBili  0.6  /  DBili  x   /  AST  56<H>  /  ALT  40  /  AlkPhos  99  09-27  TPro  6.4  /  Alb  2.5<L>  /  TBili  0.7  /  DBili  x   /  AST  77<H>  /  ALT  54<H>  /  AlkPhos  114  09-25      proBNP:   Lipid Profile:   HgA1c:   TSH:       CARDIAC MARKERS:            TELEMETRY: 	    ECG:  	  RADIOLOGY:  OTHER: 	    PREVIOUS DIAGNOSTIC TESTING:    [ ] Echocardiogram: < from: TTE with Doppler (w/Cont) (09.26.17 @ 16:50) >    Patient name: JIMI BUCHANAN  YOB: 1945   Age: 72 (M)   MR#: [id number]  Study Date: 9/26/2017  Location: [place] [id number]: Idalia Wilkerson RDCS  Study quality: Technically difficult  Referring Physician: MAYRA LI MD  Blood Pressure: 146/52 mmHg  Height: 173 cm  Weight: 82 kg  BSA: 2 m2  ------------------------------------------------------------------------  PROCEDURE: Transthoracic echocardiogram with 2-D, M-Mode  and complete spectral and color flow Doppler. Verbal  consent was obtained for injection of echo contrast  following a discussion of risks and benefits. Following  intravenous injection of contrast, harmonic imaging was  performed.  INDICATION: Cardiomyopathy, unspecified (I42.9)  ------------------------------------------------------------------------  Observations:  Mitral Valve: Tethered mitral valve leaflets with normal  opening. Mild mitral regurgitation.  Aortic Valve/Aorta: Aortic valve not well visualized. Peak  transaortic valve gradientequals 15 mm Hg, mean  transaortic valve gradient equals 8 mm Hg, aortic valve  velocity time integral equals 30 cm. Peak left ventricular  outflow tract gradient equals 6 mm Hg, mean gradient is  equal to 3 mm Hg, LVOT velocity time integral equals 18 cm.  Aortic Root: 2.5 cm.  Left Atrium: Normal left atrium.  LA volume index = 19  cc/m2.  Left Ventricle: Endocardial visualization enhanced with  intravenous injection of echo contrast (Definity). Severe  left ventricular systolic dysfunction. Regional wall motion  variation is noted. No left ventricular thrombus. EF 20%  Right Heart: Normal right atrium. The right ventricle is  not well visualized; grossly normal right ventricular  systolic function. Normal tricuspid valve. Minimal  tricuspid regurgitation. Normal pulmonic valve.  Pericardium/Pleura: Normal pericardium with no pericardial  effusion.  Hemodynamic: Estimated right atrial pressure is 8 mm Hg.  ------------------------------------------------------------------------  Conclusions:  1. Tethered mitral valve leaflets with normal opening. Mild  mitral regurgitation.  2. Endocardial visualization enhanced with intravenous  injection of echo contrast (Definity). Severe left  ventricular systolic dysfunction. Regional wall motion  variation is noted. No left ventricular thrombus. EF about  20%  3. The right ventricle is not well visualized; grossly  normal right ventricular systolic function.  ------------------------------------------------------------------------  Confirmed on  9/27/2017 - 10:58:16 by Taisha Garay M.D.  ------------------------------------------------------------------------    < end of copied text >    [ ]  Catheterization:  [ ] Stress Test:

## 2017-09-27 NOTE — PROGRESS NOTE ADULT - SUBJECTIVE AND OBJECTIVE BOX
MEDICINE ACCEPT NOTE    CCU Transfer to Medicine/Tele  Accepting Physician: Destin Torito  Admitted to Medicine/Tele 9/12, Transferred to CCU 9/21 (s/p PEA -> VT Arrest), Extubated 9/25, Transferred to Medicine/Tele 9/27    HPI/INTERVAL HISTORY:   73yo [place] speaking M with PMH CAD (s/p 12-14 stents placed in 1990s), HTN, DM2, h/o CVA (residual facial weakness), COPD, AFib, now ESRD (h/o chronic kidney/urethral stents) transferred from OSH for cardiac cath s/p Impella-assisted BABAR x5 to LAD (x2) and RCA (x3) c/b cardiac arrest with CCU stay further complicated by multifocal CVA possibly due to embolic event. Patient presented to OSH w/ flank pain and refused Perc Nephrostomy for obstructive nephropathy. He was newly started on HD when he became anuric and was then found to have NSTEMI and transferred for cardiac cath. Underwent  Impella-assisted cardiac cath with BABAR x5 to LAD (x2) and RCA (x3), tolerated without acute complication. He continued to be dialyzed for his LIN on CKD. Following HD on 9/21, patient was noted to be unresponsive, hypotensive, and bradycardic. He subsequently went into PEA arrest, CPR was initiated and patient was intubated, then was found to be in VT. ROSC after ~20min, started on Amiodarone and was transferred to the CCU. Upon arrival to the CCU, patient was noted to have loss of motor function of the RUE/RLE. A Code Stroke was called, and a subsequent head CT showed multiple areas within the bilateral cerebral and cerebellar hemispheres concerning for embolic infarct. Patient was started on heparin gtt given h/o AFib and concern for embolic infarcts, neuro consulted and following. Patient was extubated on 9/25 and has been satting well without supplemental O2. No appreciable motor fxn/strength noted on R-side and pt has been largely nonverbal but following commands, repeat CTH showed no evidence for hemorrhagic transformation.  Repeat TTE post-arrest showed severe segmental LV systolic dysfunction (EF ~ 20%), EP has been consulted for possible AICD. Patient was rhythm-controlled on Amio and transitioned to PO. Patient has continued on DAPT, statin, and started on beta-blocker.    PAST MEDICAL & SURGICAL HISTORY:  CVA (cerebral vascular accident)  COPD (chronic obstructive pulmonary disease)  BPH (benign prostatic hyperplasia)  Bipolar affective disorder  CKD (chronic kidney disease)  Pancreatitis  Hypertension  Dyslipidemia  Diabetes mellitus  Coronary artery disease  History of cardiac catheterization      REVIEW OF SYSTEMS:   Unable to obtain as patient is non-verbal    ALLERGIES:  No Known Allergies    SOCIAL HISTORY:    No Smoking/EtOH/Substance Abuse    MEDICATIONS  (STANDING):  tamsulosin 0.4 milliGRAM(s) Oral at bedtime  clopidogrel Tablet 75 milliGRAM(s) Oral daily  busPIRone 5 milliGRAM(s) Oral two times a day  atorvastatin 40 milliGRAM(s) Oral at bedtime  aspirin  chewable 81 milliGRAM(s) Oral daily  insulin lispro (HumaLOG) corrective regimen sliding scale   SubCutaneous every 6 hours  metoprolol 25 milliGRAM(s) Enteral Tube two times a day  pantoprazole    Tablet 40 milliGRAM(s) Oral before breakfast  amiodarone    Tablet 400 milliGRAM(s) Oral every 8 hours  heparin  Infusion 650 Unit(s)/Hr (6.5 mL/Hr) IV Continuous <Continuous>  warfarin 5 milliGRAM(s) Oral once    VITALS:  T(C): 36.8 (27 Sep 2017 16:51), Max: 97.9 (27 Sep 2017 04:00)  T(F): 98.2 (27 Sep 2017 16:51), Max: 208.2 (27 Sep 2017 04:00)  HR: 86 (27 Sep 2017 18:00) (76 - 114)  BP: 137/80 (27 Sep 2017 18:00) (94/54 - 156/98)  BP(mean): 97 (27 Sep 2017 16:05) (66 - 103)  RR: 16 (27 Sep 2017 16:51) (14 - 28)  SpO2: 97% (27 Sep 2017 16:51) (91% - 100%)  CAPILLARY BLOOD GLUCOSE  208 (27 Sep 2017 18:02)  209 (27 Sep 2017 12:05)    I&O's:  26 Sep 2017 07:01  -  27 Sep 2017 07:00  --------------------------------------------------------  IN: 1583.1 mL / OUT: 2600 mL / NET: -1016.9 mL    27 Sep 2017 07:01  -  27 Sep 2017 19:28  --------------------------------------------------------  IN: 708 mL / OUT: 0 mL / NET: 708 mL    PHYSICAL EXAM:  GENERAL: NAD, chronically ill-appearing  HEAD:  Atraumatic, Normocephalic  EYES: PERRLA, conjunctiva and sclera clear  NECK: Supple, No JVD  CHEST/LUNG: Clear to auscultation bilaterally  HEART: Regular rate and rhythm; No appreciable murmur  ABDOMEN: Soft, Nontender, Nondistended; Bowel sounds present  EXTREMITIES:  No cyanosis or edema  PSYCH: Awake, following commands  NEUROLOGY: no spontaneous R-sided movement  SKIN: LLE echhymosis    LABS: Personally Read and Reviewed                        8.0    8.9   )-----------( 155      ( 27 Sep 2017 04:46 )             23.5       136  |  94<L>  |  41<H>  ----------------------------<  168<H>  4.0   |  23  |  4.85<H>    Ca    8.5      27 Sep 2017 04:47  Phos  4.4     09-27  Mg     2.4     09-27    TPro  6.4  /  Alb  2.6<L>  /  TBili  0.6  /  DBili  x   /  AST  56<H>  /  ALT  40  /  AlkPhos  99  09-27    PT/INR - ( 27 Sep 2017 12:40 )   PT: 14.1 sec;   INR: 1.30 ratio    PTT - ( 27 Sep 2017 12:40 )  PTT:32.4 sec      RADIOLOGY & ADDITIONAL TESTS:  Imaging Personally Reviewed:  CT Head (9/27): Redemonstration of acute infarct involving the bilateral inferior cerebellar hemispheres, right greater than left parietal lobes, and posterior right temporal lobe. No CT evidence for hemorrhagic transformation. No acute intracranial hemorrhage. Chronic right occipital and bilateral basal ganglia infarcts  CXR (9/26): clear lungs    TTE (9/26): Mild mitral regurgitation. Severe left ventricular systolic dysfunction. No left ventricular thrombus. EF ~20%. Grossly normal right ventricular systolic function.    Consultant(s) Notes Reviewed:    Cardiology  Electrophysiology  Neurology MEDICINE ACCEPT NOTE    CCU Transfer to Medicine/Tele  Accepting Physician: Destin Torito  Admitted to Medicine/Tele 9/12, Transferred to CCU 9/21 (s/p PEA -> VT Arrest), Extubated 9/25, Transferred to Medicine/Tele 9/27    HPI/INTERVAL HISTORY:   73yo [place]an speaking M with PMH CAD (s/p 12-14 stents placed in 1990s), HTN, DM2, h/o CVA (residual facial weakness), COPD, AFib, now ESRD (h/o chronic kidney/urethral stents) transferred from OSH for cardiac cath s/p Impella-assisted BABAR x5 to LAD (x2) and RCA (x3) c/b cardiac arrest with CCU stay further complicated by multifocal CVA possibly due to embolic event. Patient presented to OSH w/ flank pain and refused Perc Nephrostomy for obstructive nephropathy. He was newly started on HD when he became anuric and was then found to have NSTEMI and transferred for cardiac cath. Underwent  Impella-assisted cardiac cath with BABAR x5 to LAD (x2) and RCA (x3), tolerated without acute complication. He continued to be dialyzed for his LIN on CKD. Following HD on 9/21, patient was noted to be unresponsive, hypotensive, and bradycardic. He subsequently went into PEA arrest, CPR was initiated and patient was intubated, then was found to be in VT. ROSC after ~20min, started on Amiodarone and was transferred to the CCU. Upon arrival to the CCU, patient was noted to have loss of motor function of the RUE/RLE. A Code Stroke was called, and a subsequent head CT showed multiple areas within the bilateral cerebral and cerebellar hemispheres concerning for embolic infarct. Patient was started on heparin gtt given h/o AFib and concern for embolic infarcts, neuro consulted and following. Patient was extubated on 9/25 and has been satting well without supplemental O2. No appreciable motor fxn/strength noted on R-side and pt has been largely nonverbal but following commands, repeat CTH showed no evidence for hemorrhagic transformation.  Repeat TTE post-arrest showed severe segmental LV systolic dysfunction (EF ~ 20%), EP has been consulted for possible AICD. Patient was rhythm-controlled on Amio and transitioned to PO. Patient has continued on DAPT, statin, and started on beta-blocker.    PAST MEDICAL & SURGICAL HISTORY:  CVA (cerebral vascular accident)  COPD (chronic obstructive pulmonary disease)  BPH (benign prostatic hyperplasia)  Bipolar affective disorder  CKD (chronic kidney disease)  Pancreatitis  Hypertension  Dyslipidemia  Diabetes mellitus  Coronary artery disease  History of cardiac catheterization      REVIEW OF SYSTEMS:   Unable to obtain as patient is non-verbal    ALLERGIES:  No Known Allergies    SOCIAL HISTORY:    No Smoking/EtOH/Substance Abuse    MEDICATIONS  (STANDING):  tamsulosin 0.4 milliGRAM(s) Oral at bedtime  clopidogrel Tablet 75 milliGRAM(s) Oral daily  busPIRone 5 milliGRAM(s) Oral two times a day  atorvastatin 40 milliGRAM(s) Oral at bedtime  aspirin  chewable 81 milliGRAM(s) Oral daily  insulin lispro (HumaLOG) corrective regimen sliding scale   SubCutaneous every 6 hours  metoprolol 25 milliGRAM(s) Enteral Tube two times a day  pantoprazole    Tablet 40 milliGRAM(s) Oral before breakfast  amiodarone    Tablet 400 milliGRAM(s) Oral every 8 hours  heparin  Infusion 650 Unit(s)/Hr (6.5 mL/Hr) IV Continuous <Continuous>  warfarin 5 milliGRAM(s) Oral once    VITALS:  T(C): 36.8 (27 Sep 2017 16:51), Max: 97.9 (27 Sep 2017 04:00)  T(F): 98.2 (27 Sep 2017 16:51), Max: 208.2 (27 Sep 2017 04:00)  HR: 86 (27 Sep 2017 18:00) (76 - 114)  BP: 137/80 (27 Sep 2017 18:00) (94/54 - 156/98)  BP(mean): 97 (27 Sep 2017 16:05) (66 - 103)  RR: 16 (27 Sep 2017 16:51) (14 - 28)  SpO2: 97% (27 Sep 2017 16:51) (91% - 100%)  CAPILLARY BLOOD GLUCOSE  208 (27 Sep 2017 18:02)  209 (27 Sep 2017 12:05)    I&O's:  26 Sep 2017 07:01  -  27 Sep 2017 07:00  --------------------------------------------------------  IN: 1583.1 mL / OUT: 2600 mL / NET: -1016.9 mL    27 Sep 2017 07:01  -  27 Sep 2017 19:28  --------------------------------------------------------  IN: 708 mL / OUT: 0 mL / NET: 708 mL    PHYSICAL EXAM:  GENERAL: NAD, chronically ill-appearing  HEAD:  Atraumatic, Normocephalic  EYES: PERRLA, conjunctiva and sclera clear  NECK: Supple, No JVD. + R IJ [place]  CHEST/LUNG: Clear to auscultation bilaterally  HEART: Regular rate and rhythm; No appreciable murmur  ABDOMEN: Soft, Nontender, Nondistended; Bowel sounds present  EXTREMITIES:  No cyanosis or edema, resolving L anterior thigh hematoma -> old in appearance  PSYCH: Awake, following commands  NEUROLOGY: no spontaneous R-sided movement  SKIN: LLE echhymosis    LABS: Personally Read and Reviewed                        8.0    8.9   )-----------( 155      ( 27 Sep 2017 04:46 )             23.5       136  |  94<L>  |  41<H>  ----------------------------<  168<H>  4.0   |  23  |  4.85<H>    Ca    8.5      27 Sep 2017 04:47  Phos  4.4     09-27  Mg     2.4     09-27    TPro  6.4  /  Alb  2.6<L>  /  TBili  0.6  /  DBili  x   /  AST  56<H>  /  ALT  40  /  AlkPhos  99  09-27    PT/INR - ( 27 Sep 2017 12:40 )   PT: 14.1 sec;   INR: 1.30 ratio    PTT - ( 27 Sep 2017 12:40 )  PTT:32.4 sec      RADIOLOGY & ADDITIONAL TESTS:  Imaging Personally Reviewed:  CT Head (9/27): Redemonstration of acute infarct involving the bilateral inferior cerebellar hemispheres, right greater than left parietal lobes, and posterior right temporal lobe. No CT evidence for hemorrhagic transformation. No acute intracranial hemorrhage. Chronic right occipital and bilateral basal ganglia infarcts  CXR (9/26): clear lungs    TTE (9/26): Mild mitral regurgitation. Severe left ventricular systolic dysfunction. No left ventricular thrombus. EF ~20%. Grossly normal right ventricular systolic function.    Consultant(s) Notes Reviewed:    Cardiology  Electrophysiology  Neurology

## 2017-09-27 NOTE — OCCUPATIONAL THERAPY INITIAL EVALUATION ADULT - ADDITIONAL COMMENTS
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 He was intubated with hypoxic respiratory distress from pulm edema from the acute NSTEMI as well as treated for  Hypertensive emergency with NTG gtt. Xray Chest: The heart is slightly enlarged. The lungs are clear. NG tube is in good position. The remainder of the life supporting devices are all in good position and unchanged when compared to previous study done 9/26/2017. No pneumothorax. Calcified aortic knob. CT Brain: Multiple areas of hypodensities within the bilateral cerebral and cerebellar hemispheres concerning for embolic infarcts.

## 2017-09-27 NOTE — OCCUPATIONAL THERAPY INITIAL EVALUATION ADULT - LIVES WITH, PROFILE
as per the  , patient lives in private house with spouse and grandchildren, has 8 hour aide/ 5 days a week ,speaks Mal[place]ian , has rolling walker/spouse

## 2017-09-27 NOTE — PROGRESS NOTE ADULT - PROBLEM SELECTOR PLAN 5
-s/p HD yesterday with 2L removal  -will need to transition to Permacath for HD access  -avoid nephrotoxins  -f/u Renal recs

## 2017-09-27 NOTE — PROGRESS NOTE ADULT - PROBLEM SELECTOR PLAN 7
-likely component of chronic disease/inflammation, EPO per Renal  -no overt bleeding at this time, FOBT negative, monitor for bleeding on triple AC  -trend H/H, relatively stable  -s/p CT A/P with no evidence of RP bleed -likely component of chronic disease/inflammation, EPO per Renal  -no overt bleeding at this time, FOBT negative, monitor for bleeding on triple AC  -trend H/H, relatively stable  -check Iron studies, b12, folate  -s/p CT A/P with no evidence of RP bleed

## 2017-09-27 NOTE — OCCUPATIONAL THERAPY INITIAL EVALUATION ADULT - PLANNED THERAPY INTERVENTIONS, OT EVAL
bed mobility training/fine motor coordination training/ROM/strengthening/transfer training/neuromuscular re-education/ADL retraining/balance training

## 2017-09-27 NOTE — PROGRESS NOTE ADULT - PROBLEM SELECTOR PLAN 1
-multifocal infarcts concerning for embolic strokes  -c/w Heparin gtt, goal PTT 50-70, bridge to Coumadin  -will likely require highest intensity statin, currently on Atorva 40mg  -S&S assessment pending, NPO for now  -PT/OT/Neuro following

## 2017-09-28 LAB
ALBUMIN SERPL ELPH-MCNC: 2.9 G/DL — LOW (ref 3.3–5)
ALP SERPL-CCNC: 110 U/L — SIGNIFICANT CHANGE UP (ref 40–120)
ALT FLD-CCNC: 45 U/L RC — SIGNIFICANT CHANGE UP (ref 10–45)
ANION GAP SERPL CALC-SCNC: 22 MMOL/L — HIGH (ref 5–17)
APTT BLD: 55.2 SEC — HIGH (ref 27.5–37.4)
APTT BLD: 56.9 SEC — HIGH (ref 27.5–37.4)
AST SERPL-CCNC: 54 U/L — HIGH (ref 10–40)
BILIRUB SERPL-MCNC: 0.5 MG/DL — SIGNIFICANT CHANGE UP (ref 0.2–1.2)
BUN SERPL-MCNC: 79 MG/DL — HIGH (ref 7–23)
CALCIUM SERPL-MCNC: 8.5 MG/DL — SIGNIFICANT CHANGE UP (ref 8.4–10.5)
CHLORIDE SERPL-SCNC: 93 MMOL/L — LOW (ref 96–108)
CO2 SERPL-SCNC: 21 MMOL/L — LOW (ref 22–31)
CREAT SERPL-MCNC: 7.02 MG/DL — HIGH (ref 0.5–1.3)
FERRITIN SERPL-MCNC: 787 NG/ML — HIGH (ref 30–400)
FOLATE SERPL-MCNC: 6.3 NG/ML — SIGNIFICANT CHANGE UP (ref 4.8–24.2)
GLUCOSE SERPL-MCNC: 225 MG/DL — HIGH (ref 70–99)
HCT VFR BLD CALC: 26.3 % — LOW (ref 39–50)
HGB BLD-MCNC: 8.7 G/DL — LOW (ref 13–17)
INR BLD: 1.24 RATIO — HIGH (ref 0.88–1.16)
IRON SATN MFR SERPL: 18 % — SIGNIFICANT CHANGE UP (ref 16–55)
IRON SATN MFR SERPL: 38 UG/DL — LOW (ref 45–165)
MAGNESIUM SERPL-MCNC: 2.7 MG/DL — HIGH (ref 1.6–2.6)
MCHC RBC-ENTMCNC: 30.6 PG — SIGNIFICANT CHANGE UP (ref 27–34)
MCHC RBC-ENTMCNC: 33.2 GM/DL — SIGNIFICANT CHANGE UP (ref 32–36)
MCV RBC AUTO: 92.1 FL — SIGNIFICANT CHANGE UP (ref 80–100)
PHOSPHATE SERPL-MCNC: 4.8 MG/DL — HIGH (ref 2.5–4.5)
PLATELET # BLD AUTO: 213 K/UL — SIGNIFICANT CHANGE UP (ref 150–400)
POTASSIUM SERPL-MCNC: 4.2 MMOL/L — SIGNIFICANT CHANGE UP (ref 3.5–5.3)
POTASSIUM SERPL-SCNC: 4.2 MMOL/L — SIGNIFICANT CHANGE UP (ref 3.5–5.3)
PROT SERPL-MCNC: 6.9 G/DL — SIGNIFICANT CHANGE UP (ref 6–8.3)
PROTHROM AB SERPL-ACNC: 13.4 SEC — HIGH (ref 9.8–12.7)
RBC # BLD: 2.85 M/UL — LOW (ref 4.2–5.8)
RBC # FLD: 13.6 % — SIGNIFICANT CHANGE UP (ref 10.3–14.5)
SODIUM SERPL-SCNC: 136 MMOL/L — SIGNIFICANT CHANGE UP (ref 135–145)
TIBC SERPL-MCNC: 212 UG/DL — LOW (ref 220–430)
UIBC SERPL-MCNC: 174 UG/DL — SIGNIFICANT CHANGE UP (ref 110–370)
VIT B12 SERPL-MCNC: 492 PG/ML — SIGNIFICANT CHANGE UP (ref 243–894)
WBC # BLD: 12.4 K/UL — HIGH (ref 3.8–10.5)
WBC # FLD AUTO: 12.4 K/UL — HIGH (ref 3.8–10.5)

## 2017-09-28 PROCEDURE — 71010: CPT | Mod: 26

## 2017-09-28 PROCEDURE — 99233 SBSQ HOSP IP/OBS HIGH 50: CPT | Mod: GC

## 2017-09-28 RX ORDER — HEPARIN SODIUM 5000 [USP'U]/ML
650 INJECTION INTRAVENOUS; SUBCUTANEOUS
Qty: 25000 | Refills: 0 | Status: DISCONTINUED | OUTPATIENT
Start: 2017-09-28 | End: 2017-09-29

## 2017-09-28 RX ORDER — WARFARIN SODIUM 2.5 MG/1
5 TABLET ORAL ONCE
Qty: 0 | Refills: 0 | Status: COMPLETED | OUTPATIENT
Start: 2017-09-28 | End: 2017-09-28

## 2017-09-28 RX ORDER — ERYTHROPOIETIN 10000 [IU]/ML
10000 INJECTION, SOLUTION INTRAVENOUS; SUBCUTANEOUS
Qty: 0 | Refills: 0 | Status: DISCONTINUED | OUTPATIENT
Start: 2017-09-28 | End: 2017-10-03

## 2017-09-28 RX ORDER — PANTOPRAZOLE SODIUM 20 MG/1
40 TABLET, DELAYED RELEASE ORAL DAILY
Qty: 0 | Refills: 0 | Status: DISCONTINUED | OUTPATIENT
Start: 2017-09-28 | End: 2017-10-18

## 2017-09-28 RX ORDER — HEPARIN SODIUM 5000 [USP'U]/ML
6.5 INJECTION INTRAVENOUS; SUBCUTANEOUS
Qty: 25000 | Refills: 0 | Status: DISCONTINUED | OUTPATIENT
Start: 2017-09-28 | End: 2017-09-28

## 2017-09-28 RX ADMIN — Medication 3: at 18:51

## 2017-09-28 RX ADMIN — CLOPIDOGREL BISULFATE 75 MILLIGRAM(S): 75 TABLET, FILM COATED ORAL at 18:51

## 2017-09-28 RX ADMIN — Medication 25 MILLIGRAM(S): at 18:51

## 2017-09-28 RX ADMIN — WARFARIN SODIUM 5 MILLIGRAM(S): 2.5 TABLET ORAL at 21:20

## 2017-09-28 RX ADMIN — Medication 5 MILLIGRAM(S): at 00:51

## 2017-09-28 RX ADMIN — PANTOPRAZOLE SODIUM 40 MILLIGRAM(S): 20 TABLET, DELAYED RELEASE ORAL at 15:29

## 2017-09-28 RX ADMIN — Medication 1: at 00:49

## 2017-09-28 RX ADMIN — ERYTHROPOIETIN 10000 UNIT(S): 10000 INJECTION, SOLUTION INTRAVENOUS; SUBCUTANEOUS at 11:38

## 2017-09-28 RX ADMIN — TAMSULOSIN HYDROCHLORIDE 0.4 MILLIGRAM(S): 0.4 CAPSULE ORAL at 21:20

## 2017-09-28 RX ADMIN — Medication 81 MILLIGRAM(S): at 18:51

## 2017-09-28 RX ADMIN — ATORVASTATIN CALCIUM 40 MILLIGRAM(S): 80 TABLET, FILM COATED ORAL at 21:20

## 2017-09-28 RX ADMIN — HEPARIN SODIUM 6.5 UNIT(S)/HR: 5000 INJECTION INTRAVENOUS; SUBCUTANEOUS at 15:46

## 2017-09-28 RX ADMIN — Medication 5 MILLIGRAM(S): at 18:51

## 2017-09-28 RX ADMIN — HEPARIN SODIUM 0.07 UNIT(S)/HR: 5000 INJECTION INTRAVENOUS; SUBCUTANEOUS at 03:04

## 2017-09-28 RX ADMIN — Medication 25 MILLIGRAM(S): at 06:49

## 2017-09-28 RX ADMIN — AMIODARONE HYDROCHLORIDE 400 MILLIGRAM(S): 400 TABLET ORAL at 06:49

## 2017-09-28 RX ADMIN — Medication 2: at 06:48

## 2017-09-28 RX ADMIN — AMIODARONE HYDROCHLORIDE 400 MILLIGRAM(S): 400 TABLET ORAL at 18:51

## 2017-09-28 NOTE — PROGRESS NOTE ADULT - SUBJECTIVE AND OBJECTIVE BOX
Patient is a 72y old  Male who presents with a chief complaint of s/p cardiac cath (22 Sep 2017 12:34)        SUBJECTIVE / OVERNIGHT EVENTS:      MEDICATIONS  (STANDING):  tamsulosin 0.4 milliGRAM(s) Oral at bedtime  clopidogrel Tablet 75 milliGRAM(s) Oral daily  busPIRone 5 milliGRAM(s) Oral two times a day  atorvastatin 40 milliGRAM(s) Oral at bedtime  aspirin  chewable 81 milliGRAM(s) Oral daily  insulin lispro (HumaLOG) corrective regimen sliding scale   SubCutaneous every 6 hours  metoprolol 25 milliGRAM(s) Enteral Tube two times a day  pantoprazole    Tablet 40 milliGRAM(s) Oral before breakfast  amiodarone    Tablet 400 milliGRAM(s) Oral every 8 hours  heparin  Infusion 6.5 Unit(s)/Hr (0.065 mL/Hr) IV Continuous <Continuous>    MEDICATIONS  (PRN):      T(C): 36.9 (09-28-17 @ 04:13), Max: 37.2 (09-27-17 @ 08:00)  HR: 80 (09-28-17 @ 04:13) (78 - 99)  BP: 128/79 (09-28-17 @ 04:13) (99/56 - 156/98)  RR: 18 (09-28-17 @ 04:13) (14 - 28)  SpO2: 98% (09-28-17 @ 04:13) (91% - 100%)  CAPILLARY BLOOD GLUCOSE  225 (28 Sep 2017 06:20)  200 (28 Sep 2017 00:27)  211 (27 Sep 2017 21:17)  208 (27 Sep 2017 18:02)  209 (27 Sep 2017 12:05)        I&O's Summary    27 Sep 2017 07:01  -  28 Sep 2017 07:00  --------------------------------------------------------  IN: 708 mL / OUT: 0 mL / NET: 708 mL        PHYSICAL EXAM  GENERAL: NAD, well-developed  HEAD:  Atraumatic, Normocephalic  EYES: EOMI, PERRLA, conjunctiva and sclera clear  NECK: Supple, No JVD  CHEST/LUNG: Clear to auscultation bilaterally; No wheeze  HEART: Regular rate and rhythm; No murmurs, rubs, or gallops  ABDOMEN: Soft, Nontender, Nondistended; Bowel sounds present  EXTREMITIES:  2+ Peripheral Pulses, No clubbing, cyanosis, or edema  PSYCH: AAOx3  SKIN: No rashes or lesions    LABS:                        8.0    8.9   )-----------( 155      ( 27 Sep 2017 04:46 )             23.5     09-27    136  |  94<L>  |  41<H>  ----------------------------<  168<H>  4.0   |  23  |  4.85<H>    Ca    8.5      27 Sep 2017 04:47  Phos  4.4     09-27  Mg     2.4     09-27    TPro  6.4  /  Alb  2.6<L>  /  TBili  0.6  /  DBili  x   /  AST  56<H>  /  ALT  40  /  AlkPhos  99  09-27    PT/INR - ( 27 Sep 2017 19:45 )   PT: 14.1 sec;   INR: 1.30 ratio         PTT - ( 28 Sep 2017 02:23 )  PTT:56.9 sec          RADIOLOGY & ADDITIONAL TESTS:    Imaging Personally Reviewed:  Consultant(s) Notes Reviewed:    Care Discussed with Consultants/Other Providers: Patient is a 72y old  Male who presents with a chief complaint of s/p cardiac cath (22 Sep 2017 12:34)        SUBJECTIVE / OVERNIGHT EVENTS: Pt minimally verbal. Able to follow commands. Brother at bedside. Pt denied any chest pain with head movements. NGT in place. Pending Dialysis today.      MEDICATIONS  (STANDING):  tamsulosin 0.4 milliGRAM(s) Oral at bedtime  clopidogrel Tablet 75 milliGRAM(s) Oral daily  busPIRone 5 milliGRAM(s) Oral two times a day  atorvastatin 40 milliGRAM(s) Oral at bedtime  aspirin  chewable 81 milliGRAM(s) Oral daily  insulin lispro (HumaLOG) corrective regimen sliding scale   SubCutaneous every 6 hours  metoprolol 25 milliGRAM(s) Enteral Tube two times a day  pantoprazole    Tablet 40 milliGRAM(s) Oral before breakfast  amiodarone    Tablet 400 milliGRAM(s) Oral every 8 hours  heparin  Infusion 6.5 Unit(s)/Hr (0.065 mL/Hr) IV Continuous <Continuous>    MEDICATIONS  (PRN):      T(C): 36.9 (09-28-17 @ 04:13), Max: 37.2 (09-27-17 @ 08:00)  HR: 80 (09-28-17 @ 04:13) (78 - 99)  BP: 128/79 (09-28-17 @ 04:13) (99/56 - 156/98)  RR: 18 (09-28-17 @ 04:13) (14 - 28)  SpO2: 98% (09-28-17 @ 04:13) (91% - 100%)  CAPILLARY BLOOD GLUCOSE  225 (28 Sep 2017 06:20)  200 (28 Sep 2017 00:27)  211 (27 Sep 2017 21:17)  208 (27 Sep 2017 18:02)  209 (27 Sep 2017 12:05)        I&O's Summary    27 Sep 2017 07:01  -  28 Sep 2017 07:00  --------------------------------------------------------  IN: 708 mL / OUT: 0 mL / NET: 708 mL        PHYSICAL EXAM  GENERAL: NAD, well-developed  HEAD:  Atraumatic, Normocephalic  EYES: EOMI, PERRLA, conjunctiva and sclera clear  NECK: Supple, No JVD  CHEST/LUNG: Clear to auscultation bilaterally; No wheeze  HEART:Irregularly, irregular,  No murmurs, rubs, or gallops  ABDOMEN: Soft, Nontender, Nondistended; Bowel sounds present  EXTREMITIES:  2+ Peripheral Pulses, No clubbing, cyanosis, or edema  PSYCH: Unable to assess as pt is nonverbal  SKIN: No rashes or lesions  Lines: NGT in place     LABS:                           8.7    12.4  )-----------( 213      ( 28 Sep 2017 07:09 )             26.3     09-28    136  |  93<L>  |  79<H>  ----------------------------<  225<H>  4.2   |  21<L>  |  7.02<H>    Ca    8.5      28 Sep 2017 07:09  Phos  4.8     09-28  Mg     2.7     09-28    TPro  6.9  /  Alb  2.9<L>  /  TBili  0.5  /  DBili  x   /  AST  54<H>  /  ALT  45  /  AlkPhos  110  09-28    PT/INR - ( 28 Sep 2017 07:09 )   PT: 13.4 sec;   INR: 1.24 ratio         PTT - ( 28 Sep 2017 07:09 )  PTT:55.2 sec              RADIOLOGY & ADDITIONAL TESTS:    Imaging Personally Reviewed:  Consultant(s) Notes Reviewed:    Care Discussed with Consultants/Other Providers: Patient is a 72y old  Male who presents with a chief complaint of s/p cardiac cath (22 Sep 2017 12:34)        SUBJECTIVE / OVERNIGHT EVENTS: Pt minimally verbal. Able to follow commands. Unable to move RUE and RLE. Brother at bedside. Pt denied any chest pain with head movements. NGT in place. Pending Dialysis today.      MEDICATIONS  (STANDING):  tamsulosin 0.4 milliGRAM(s) Oral at bedtime  clopidogrel Tablet 75 milliGRAM(s) Oral daily  busPIRone 5 milliGRAM(s) Oral two times a day  atorvastatin 40 milliGRAM(s) Oral at bedtime  aspirin  chewable 81 milliGRAM(s) Oral daily  insulin lispro (HumaLOG) corrective regimen sliding scale   SubCutaneous every 6 hours  metoprolol 25 milliGRAM(s) Enteral Tube two times a day  pantoprazole    Tablet 40 milliGRAM(s) Oral before breakfast  amiodarone    Tablet 400 milliGRAM(s) Oral every 8 hours  heparin  Infusion 6.5 Unit(s)/Hr (0.065 mL/Hr) IV Continuous <Continuous>    MEDICATIONS  (PRN):      T(C): 36.9 (09-28-17 @ 04:13), Max: 37.2 (09-27-17 @ 08:00)  HR: 80 (09-28-17 @ 04:13) (78 - 99)  BP: 128/79 (09-28-17 @ 04:13) (99/56 - 156/98)  RR: 18 (09-28-17 @ 04:13) (14 - 28)  SpO2: 98% (09-28-17 @ 04:13) (91% - 100%)  CAPILLARY BLOOD GLUCOSE  225 (28 Sep 2017 06:20)  200 (28 Sep 2017 00:27)  211 (27 Sep 2017 21:17)  208 (27 Sep 2017 18:02)  209 (27 Sep 2017 12:05)        I&O's Summary    27 Sep 2017 07:01  -  28 Sep 2017 07:00  --------------------------------------------------------  IN: 708 mL / OUT: 0 mL / NET: 708 mL        PHYSICAL EXAM  GENERAL: NAD, well-developed  HEAD:  Atraumatic, Normocephalic  EYES: EOMI, PERRLA, conjunctiva and sclera clear  NECK: Supple, No JVD  CHEST/LUNG: Clear to auscultation bilaterally; No wheeze  HEART:Irregularly, irregular,  No murmurs, rubs, or gallops  ABDOMEN: Soft, Nontender, Nondistended; Bowel sounds present  EXTREMITIES:  2+ Peripheral Pulses. 5/5 strength on LUE and LLE. 0/5 strength on RUE and RLE.  PSYCH: Unable to assess as pt is nonverbal  SKIN: No rashes or lesions  Lines: NGT in place     LABS:                           8.7    12.4  )-----------( 213      ( 28 Sep 2017 07:09 )             26.3     09-28    136  |  93<L>  |  79<H>  ----------------------------<  225<H>  4.2   |  21<L>  |  7.02<H>    Ca    8.5      28 Sep 2017 07:09  Phos  4.8     09-28  Mg     2.7     09-28    TPro  6.9  /  Alb  2.9<L>  /  TBili  0.5  /  DBili  x   /  AST  54<H>  /  ALT  45  /  AlkPhos  110  09-28    PT/INR - ( 28 Sep 2017 07:09 )   PT: 13.4 sec;   INR: 1.24 ratio         PTT - ( 28 Sep 2017 07:09 )  PTT:55.2 sec              RADIOLOGY & ADDITIONAL TESTS:    Imaging Personally Reviewed:  Consultant(s) Notes Reviewed:    Care Discussed with Consultants/Other Providers:

## 2017-09-28 NOTE — PROGRESS NOTE ADULT - PROBLEM SELECTOR PLAN 7
-likely component of chronic disease/inflammation, EPO per Renal  -no overt bleeding at this time, FOBT negative, monitor for bleeding on triple AC  -trend H/H, relatively stable  -check Iron studies, b12, folate  -s/p CT A/P with no evidence of RP bleed -likely component of chronic disease/inflammation, EPO per Renal  -no overt bleeding at this time, FOBT negative, monitor for bleeding on triple AC  -trend H/H, relatively stable -likely component of chronic disease/inflammation, EPO per Renal  -no overt bleeding at this time, FOBT negative, monitor for bleeding on triple AC  -trend H/H, relatively stable  - likely Anemia of Chronic disease in setting of ESRD and low TIBC

## 2017-09-28 NOTE — PROGRESS NOTE ADULT - PROBLEM SELECTOR PLAN 1
-multifocal infarcts concerning for embolic strokes  -c/w Heparin gtt, goal PTT 50-70, bridge to Coumadin  -will likely require highest intensity statin, currently on Atorva 40mg  -S&S assessment pending, NPO for now  -PT/OT/Neuro following -multifocal infarcts concerning for embolic strokes  -c/w Heparin gtt, goal PTT 50-70, bridge to Coumadin  - dose 5mg coumadin  - Atorvastin 40mg  - S&S assessment pending, NPO for now  -PT/OT/Neuro following  - NGT in place

## 2017-09-28 NOTE — PROGRESS NOTE ADULT - PROBLEM SELECTOR PLAN 5
-s/p HD yesterday with 2L removal  -will need to transition to Permacath for HD access  -avoid nephrotoxins  -f/u Renal recs - pending HD today  -will need to transition to Permacath for HD access  -avoid nephrotoxins  -f/u Renal recs

## 2017-09-28 NOTE — PROGRESS NOTE ADULT - ATTENDING COMMENTS
Scheduled for dialysis today. Pending speech and swallow evaluation given CVA. Consider palliative consult for goals of care discussion pending swallow eval as pt would likely require PEG. EP consulted for possible BIV-AICD placement given EF 20%. Continue heparin bridge to coumadin

## 2017-09-28 NOTE — PROGRESS NOTE ADULT - SUBJECTIVE AND OBJECTIVE BOX
Interval History: Briefly this is a 73 yo M with PMH of extensive CAD, T2DM, CKD, prior CVA transferred from OSH for NSTEMI s/p Impella-assisted LHC w/ BABAR x5 and obstructive nephropathy requiring HD transferred from CCU after PEA/Vtach arrest with ROSC c/b new embolic CVA. s/p transfer from CCU.  Pt minimally verbal. Able to follow commands and currently stable on medical floor.     Review Of Systems:  Constitutional: [ ] Fever [ ] Chills [ ] Fatigue [ ] Weight change   HEENT: [ ] Blurred vision [ ] Eye Pain [ ] Headache [ ] Runny nose [ ] Sore Throat   Respiratory: [ ] Cough [ ] Wheezing [ ] Shortness of breath  Cardiovascular: [ ] Chest Pain [ ] Palpitations [ ] CHAMPION [ ] PND [ ] Orthopnea  Gastrointestinal: [ ] Abdominal Pain [ ] Diarrhea [ ] Constipation [ ] Hemorrhoids [ ] Nausea [ ] Vomiting  Genitourinary: [ ] Nocturia [ ] Dysuria [ ] Incontinence  Extremities: [ ] Swelling [ ] Joint Pain  Neurologic: [ ] Focal deficit [ ] Paresthesias [ ] Syncope  Lymphatic: [ ] Swelling [ ] Lymphadenopathy   Skin: [ ] Rash [ ] Ecchymoses [ ] Wounds [ ] Lesions  Psychiatry: [ ] Depression [ ] Suicidal/Homicidal Ideation [ ] Anxiety [ ] Sleep Disturbances  [ ] 10 point review of systems is otherwise negative except as mentioned above            [x ]Unable to obtain    Medications:  tamsulosin 0.4 milliGRAM(s) Oral at bedtime  clopidogrel Tablet 75 milliGRAM(s) Oral daily  busPIRone 5 milliGRAM(s) Oral two times a day  atorvastatin 40 milliGRAM(s) Oral at bedtime  aspirin  chewable 81 milliGRAM(s) Oral daily  insulin lispro (HumaLOG) corrective regimen sliding scale   SubCutaneous every 6 hours  metoprolol 25 milliGRAM(s) Enteral Tube two times a day  amiodarone    Tablet 400 milliGRAM(s) Oral every 8 hours  warfarin 5 milliGRAM(s) Oral once  epoetin georgie Injectable 67099 Unit(s) IV Push <User Schedule>  pantoprazole  Injectable 40 milliGRAM(s) IV Push daily  heparin  Infusion 650 Unit(s)/Hr IV Continuous <Continuous>    PMH/PSH/FH/SH: [ ] Unchanged  Vitals:  T(C): 37.1 (17 @ 14:48), Max: 37.2 (17 @ 10:15)  HR: 94 (17 @ 18:42) (76 - 101)  BP: 113/77 (17 @ 18:42) (113/77 - 144/77)  BP(mean): 80 (17 @ 04:13) (80 - 80)  RR: 20 (17 @ 14:48) (16 - 20)  SpO2: 99% (17 @ 14:48) (95% - 99%)  Wt(kg): --  Daily     Daily Weight in k.8 (28 Sep 2017 14:00)  I&O's Summary    27 Sep 2017 07:01  -  28 Sep 2017 07:00  --------------------------------------------------------  IN: 2108 mL / OUT: 0 mL / NET: 2108 mL    28 Sep 2017 07:01  -  28 Sep 2017 19:13  --------------------------------------------------------  IN: 100 mL / OUT: 1900 mL / NET: -1800 mL        Physical Exam:  GENERAL: NAD  HEAD: NC/AT  NECK: Supple  CHEST/LUNG: Grossly CTAB anteriorly, No wheeze appreciated  HEART: RRR  ABDOMEN: +BS, Soft, Nontender, No rigidity  EXTREMITIES: No significant peripheral edema  PSYCH: Calm  NEUROLOGY: PERRLA, Moves LUE on command, Able to move proximal LLE, No movement seen of RUE/RLE  SKIN:  ecchymosis on left proximal anterior thigh which extends to LLQ/Left flank    Labs:                        8.7    12.4  )-----------( 213      ( 28 Sep 2017 07:09 )             26.3         136  |  93<L>  |  79<H>  ----------------------------<  225<H>  4.2   |  21<L>  |  7.02<H>    Ca    8.5      28 Sep 2017 07:09  Phos  4.8       Mg     2.7         TPro  6.9  /  Alb  2.9<L>  /  TBili  0.5  /  DBili  x   /  AST  54<H>  /  ALT  45  /  AlkPhos  110      PT/INR - ( 28 Sep 2017 07:09 )   PT: 13.4 sec;   INR: 1.24 ratio         PTT - ( 28 Sep 2017 07:09 )  PTT:55.2 sec      Tele: Sinus Rhythm/Afib occasionally about 100BPM

## 2017-09-28 NOTE — PROGRESS NOTE ADULT - SUBJECTIVE AND OBJECTIVE BOX
24H hour events:  No acute events, the patient feels well, improved since admission.    MEDICATIONS:  tamsulosin 0.4 milliGRAM(s) Oral at bedtime  clopidogrel Tablet 75 milliGRAM(s) Oral daily  aspirin  chewable 81 milliGRAM(s) Oral daily  metoprolol 25 milliGRAM(s) Enteral Tube two times a day  amiodarone    Tablet 400 milliGRAM(s) Oral every 8 hours  heparin  Infusion 6.5 Unit(s)/Hr IV Continuous <Continuous>  warfarin 5 milliGRAM(s) Oral once        busPIRone 5 milliGRAM(s) Oral two times a day    pantoprazole  Injectable 40 milliGRAM(s) IV Push daily    atorvastatin 40 milliGRAM(s) Oral at bedtime  insulin lispro (HumaLOG) corrective regimen sliding scale   SubCutaneous every 6 hours    epoetin georgie Injectable 16551 Unit(s) IV Push <User Schedule>        PHYSICAL EXAM:  T(C): 37.2 (09-28-17 @ 10:15), Max: 37.2 (09-28-17 @ 10:15)  HR: 76 (09-28-17 @ 10:15) (76 - 99)  BP: 123/73 (09-28-17 @ 10:15) (123/73 - 156/98)  RR: 20 (09-28-17 @ 10:15) (16 - 23)  SpO2: 98% (09-28-17 @ 10:15) (96% - 100%)  Wt(kg): --  I&O's Summary    27 Sep 2017 07:01  -  28 Sep 2017 07:00  --------------------------------------------------------  IN: 2108 mL / OUT: 0 mL / NET: 2108 mL    28 Sep 2017 07:01  -  28 Sep 2017 14:27  --------------------------------------------------------  IN: 100 mL / OUT: 0 mL / NET: 100 mL        Appearance: Normal	  HEENT:   Normal oral mucosa  Lymphatic: No lymphadenopathy  Cardiovascular: Normal S1 S2,         No JVD,      No murmurs,      No edema  Respiratory: Lungs clear to auscultation	  Psychiatry: Mood & affect appropriate  Gastrointestinal:  Soft, Non-tender	  Skin: No rashes, No ecchymoses, No cyanosis	  Neurologic: Non-focal  Extremities: Normal range of motion, No clubbing, cyanosis   Vascular: warm extremities        LABS:	 	    CBC Full  -  ( 28 Sep 2017 07:09 )  WBC Count : 12.4 K/uL  Hemoglobin : 8.7 g/dL  Hematocrit : 26.3 %  Platelet Count - Automated : 213 K/uL  Mean Cell Volume : 92.1 fl  Mean Cell Hemoglobin : 30.6 pg  Mean Cell Hemoglobin Concentration : 33.2 gm/dL  Auto Neutrophil # : x  Auto Lymphocyte # : x  Auto Monocyte # : x  Auto Eosinophil # : x  Auto Basophil # : x  Auto Neutrophil % : x  Auto Lymphocyte % : x  Auto Monocyte % : x  Auto Eosinophil % : x  Auto Basophil % : x    09-28    136  |  93<L>  |  79<H>  ----------------------------<  225<H>  4.2   |  21<L>  |  7.02<H>  09-27    136  |  94<L>  |  41<H>  ----------------------------<  168<H>  4.0   |  23  |  4.85<H>    Ca    8.5      28 Sep 2017 07:09  Ca    8.5      27 Sep 2017 04:47  Phos  4.8     09-28  Phos  4.4     09-27  Mg     2.7     09-28  Mg     2.4     09-27    TPro  6.9  /  Alb  2.9<L>  /  TBili  0.5  /  DBili  x   /  AST  54<H>  /  ALT  45  /  AlkPhos  110  09-28  TPro  6.4  /  Alb  2.6<L>  /  TBili  0.6  /  DBili  x   /  AST  56<H>  /  ALT  40  /  AlkPhos  99  09-27    Telemetry : Afib 70-90

## 2017-09-28 NOTE — PROGRESS NOTE ADULT - ASSESSMENT
73yo M with PMH of extensive CAD, T2DM, CKD, prior CVA transferred from OSH for NSTEMI s/p Impella-assisted LHC w/ BABAR x5 and obstructive nephropathy requiring HD transferred from CCU after PEA/Vtach arrest with ROSC c/b new embolic CVA.

## 2017-09-28 NOTE — PROGRESS NOTE ADULT - PROBLEM SELECTOR PLAN 2
-c/w PO Amio, load to 10g (completes in 5 days)  -c/w heparin gtt, bridge to coumadin  -daily INR, dose coumadin accordingly  -c/w Tele monitoring  -Cards following -c/w PO Amio, load to 10g (completes in 5 days)  -c/w heparin gtt, bridge to coumadin  - dose 5mg coumadin  -daily INR  -c/w Tele monitoring  -Cards following

## 2017-09-28 NOTE — PROGRESS NOTE ADULT - ASSESSMENT
72yoM, hx of CAD (DESx18), CKDm DM2, COPD, CVA (remote, no deficits), Bipolar (not on meds). Presented with flank pain, developed NSTEMI, hypertensive emergency,  TWI. [facility] on 9/14 with BABAR to pRCA.  9/21 had cardiac arrest, tele shows bradycardia, evolving intop VF.  18 minutes CPR, intubated.   9/23 with new CVA dx, new aphasia, weakness. 9/27 EF 20%      VF arrest  - I spoke with the main health care decision maker, the patients daughter, Ruth ([phone])   - She is interested in a palliative approach.  - recommend palliative care consult.   - EP to continue to follow  - please maintain K>4 , Mg>2  - For any questions or If there are any concerns, please call [phone] during daytime, covered by [id number] after-hours

## 2017-09-28 NOTE — PROGRESS NOTE ADULT - PROBLEM SELECTOR PLAN 10
-DVT PPX: heparin gtt        Tod Loaiza M.D.  Medicine Resident, PGY-2  Pager: [phone] -DVT PPX: heparin gtt

## 2017-09-28 NOTE — SWALLOW BEDSIDE ASSESSMENT ADULT - PHARYNGEAL PHASE
intermittent repeat swallow/Delayed pharyngeal swallow Delayed pharyngeal swallow/Multiple swallows/increased WOB and RR rate post intake, possibly suggestive of laryngeal penetration and/or aspiration

## 2017-09-28 NOTE — SWALLOW BEDSIDE ASSESSMENT ADULT - NS ASR SWALLOW FINDINGS DISCUS
Physician/Nursing/Patient/Family Covering RN, MD Lima, pt's daughter via telephone/Physician/Patient/Family/Nursing

## 2017-09-28 NOTE — SWALLOW BEDSIDE ASSESSMENT ADULT - SWALLOW EVAL: DIAGNOSIS
Subjective assessment limited; unable to assess vocal quality given pt nonverbal and ? dysphonic. Patient presents with 1) oral and suspected pharyngeal dysphagia characterized by mildly prolonged oral transfer, suspected premature loss, delayed pharyngeal swallow trigger with honey thickened liquids, intermittent repeat swallow suggestive of pharyngeal retention, and increased WOB and subtle wet breath sounds post intake of honey thickened liquids possibly suggestive of laryngeal penetration and/or aspiration. 2) Possible aphasia vs cognitive-linguistic deficits. 3) Dysphonia vs poor respiratory effort. Subjective assessment limited; unable to assess vocal quality given pt nonverbal and ? dysphonic. Patient presents with 1) oral and suspected pharyngeal dysphagia characterized by prolonged oral transfer, suspected premature loss, delayed pharyngeal swallow trigger with honey thickened liquids, intermittent repeat swallow suggestive of pharyngeal retention, and increased WOB and subtle wet breath sounds post intake of honey thickened liquids possibly suggestive of laryngeal penetration and/or aspiration. 2) Possible aphasia vs cognitive-linguistic deficits. 3) Dysphonia vs poor respiratory effort. Subjective assessment limited; unable to assess vocal quality given pt nonverbal and ? dysphonic. Patient presents with 1) oral and suspected pharyngeal dysphagia characterized by prolonged oral transfer, suspected premature loss, delayed pharyngeal swallow trigger, intermittent repeat swallow suggestive of pharyngeal retention, and increased WOB and subtle wet breath sounds post intake of honey thickened liquids possibly suggestive of laryngeal penetration and/or aspiration. 2) Possible aphasia vs cognitive-linguistic deficits. 3) Dysphonia vs poor respiratory effort.

## 2017-09-28 NOTE — SWALLOW BEDSIDE ASSESSMENT ADULT - SWALLOW EVAL: PATIENT/FAMILY GOALS STATEMENT
Unable to obtain as pt nonverbal. Contacted patient's daughter via telephone, reports h/o 2-3 CVAs PTA, however states the only deficit following same was reduced L facial sensation and movement. Endorses baseline cough for years 2/2 h/o taking "sucking tobacco."

## 2017-09-28 NOTE — SWALLOW BEDSIDE ASSESSMENT ADULT - SWALLOW EVAL: FUNCTIONAL LEVEL AT TIME OF EVAL
Hx contd: Seen by EP pt w/ onset of Wenkebach progressing to complete heart block followed by a VF arrest 7 days post revascularization. Prolonged period of resuscitation with subsequent stroke leaving the patient aphasic and hemiparetic but alert. Post code LV function at 20%. At this point he is a candidate for either a dual chamber pacemaker or dual chamber ICD, the latter option, given his age and functional status, pending a goals of care discussion with the family.  *See Addendum for Imaging

## 2017-09-28 NOTE — SWALLOW BEDSIDE ASSESSMENT ADULT - SLP PERTINENT HISTORY OF CURRENT PROBLEM
71 yo M PMH CAD s/p 12-14 stents placed [id number], T2 DM. HTN HLD, CVA 4 yrs ago w/ residual L sided facial weaknes, radiculopathy with herniated disc, COPD, CKD Cr 2 months ago 1.2, bipolar (no meds followed by psychiatry), chronic kidney stones and has had urethral tents in the past who presented to [facility] w/ 2 days intractable right flank pain radiating to groin w/ chills. Pt was admitted w/ pyelonephritis started on ceftriaxone. 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 Pt is now s/p james making no urine. Treated in the ICU for NSTEMI w/ TWI precordial and lateral leads w/ increased troponin I. Also started on Hep gtt, loaded with ASa and plavix, TTE with EF 35 %. He was intubated w/ hypoxic respiratory distress from pulm edema from the acute NSTEMI as well as treated for hypertensive emergency with NTG gtt. Now transferred to [facility] for cardiac angiogram.

## 2017-09-28 NOTE — PROGRESS NOTE ADULT - ASSESSMENT
71 y/o M w/ a PMHx of extensive CAD, T2DM, ESRD new HD, prior CVA who was transferred from OSH for NSTEMI s/p impella assisted LHC and pyelonephritis with course c/b obstructive nephropathy requiring HD transferred from floor to CCU after cardiac arrest, v-tach and subsequent ROSC w/ hospital course further c/b embolic CVA (9/23).    NSTEMI c/b Vtach arrest s/p Impella-assisted Cath with BABAR x5 to LAD/RCA  - c/w DAPT, Lipitor 40mg QHS, and Lopressor 25mg PO BI. .   - holding ACEI given LIN/CKD  - EP had discussion with family. No plan for ICD at this time    Atrial fibrillation and flutter- relatively rate controlled and remains hemodynamically stable  - c/w PO Amio, load to 10g (completes in 5 days)  - c/w heparin gtt, bridge to coumadin 71 y/o M w/ a PMHx of extensive CAD, T2DM, ESRD new HD, prior CVA who was transferred from OSH for NSTEMI s/p impella assisted LHC and pyelonephritis with course c/b obstructive nephropathy requiring HD transferred from floor to CCU after cardiac arrest, v-tach and subsequent ROSC w/ hospital course further c/b embolic CVA (9/23).    NSTEMI c/b Vtach arrest s/p Impella-assisted Cath with BABAR x5 to LAD/RCA  - c/w DAPT, Lipitor 40mg QHS, and Lopressor 25mg PO BI. .   - holding ACEI given LIN/CKD. f/u with renal regarding further dialysis   - EP had discussion with family. No plan for ICD at this time    Atrial fibrillation and flutter- relatively rate controlled and remains hemodynamically stable  - c/w PO Amio, load to 10g (completes in 5 days)  - c/w heparin gtt, bridge to coumadin

## 2017-09-28 NOTE — PROVIDER CONTACT NOTE (MEDICATION) - ASSESSMENT
Patient therapeutic on heparin drip at 6.5 ml hour (650 units/hour). Current order states to infuse at 6.5 units/hr. Previous heparin drip order was also running at 650 units/hour.

## 2017-09-28 NOTE — PROGRESS NOTE ADULT - SUBJECTIVE AND OBJECTIVE BOX
Seen on HD    Vital Signs Last 24 Hrs  T(C): 37.2 (09-28-17 @ 10:15), Max: 37.2 (09-28-17 @ 10:15)  T(F): 98.9 (09-28-17 @ 10:15), Max: 98.9 (09-28-17 @ 10:15)  HR: 76 (09-28-17 @ 10:15) (76 - 99)  BP: 123/73 (09-28-17 @ 10:15) (117/77 - 156/98)  BP(mean): 80 (09-28-17 @ 04:13) (80 - 98)  RR: 20 (09-28-17 @ 10:15) (14 - 28)  SpO2: 98% (09-28-17 @ 10:15) (96% - 100%)                            8.7    12.4  )-----------( 213      ( 28 Sep 2017 07:09 )             26.3     28 Sep 2017 07:09    136    |  93     |  79     ----------------------------<  225    4.2     |  21     |  7.02     Ca    8.5        28 Sep 2017 07:09  Phos  4.8       28 Sep 2017 07:09  Mg     2.7       28 Sep 2017 07:09    TPro  6.9    /  Alb  2.9    /  TBili  0.5    /  DBili  x      /  AST  54     /  ALT  45     /  AlkPhos  110    28 Sep 2017 07:09    LIVER FUNCTIONS - ( 28 Sep 2017 07:09 )  Alb: 2.9 g/dL / Pro: 6.9 g/dL / ALK PHOS: 110 U/L / ALT: 45 U/L RC / AST: 54 U/L / GGT: x           PT/INR - ( 28 Sep 2017 07:09 )   PT: 13.4 sec;   INR: 1.24 ratio      General: NAD  Respiratory: good air entry  Cardiovascular: S1 S2   Gastrointestinal: soft, ND, BS present  Extremities:  no edema    tamsulosin 0.4 milliGRAM(s) Oral at bedtime  clopidogrel Tablet 75 milliGRAM(s) Oral daily  busPIRone 5 milliGRAM(s) Oral two times a day  atorvastatin 40 milliGRAM(s) Oral at bedtime  aspirin  chewable 81 milliGRAM(s) Oral daily  insulin lispro (HumaLOG) corrective regimen sliding scale   SubCutaneous every 6 hours  metoprolol 25 milliGRAM(s) Enteral Tube two times a day  pantoprazole    Tablet 40 milliGRAM(s) Oral before breakfast  amiodarone    Tablet 400 milliGRAM(s) Oral every 8 hours  heparin  Infusion 6.5 Unit(s)/Hr IV Continuous <Continuous>  warfarin 5 milliGRAM(s) Oral once  epoetin georgie Injectable 39586 Unit(s) IV Push <User Schedule>      Assessment and Plan:   		  CM, NSTEMI, s/p cath, PCI, s/p arrest, intubation  Afib, s/p CVA  S/p LIN on CKD in setting of non-fx L kidney and R hydro due to stone plus hemodynamic/dye injury  Started on HD in [facility]  Per most recent CT A/P passed stone and hydro resolved  UO minimal  Extubated  Will continue HD via temporary RIJ HD cath TTS w/fluid removal as able  Procrit w/HD  While most likely pt will require long term dialysis, at this point would avoid ACE/ARB or other nephrotoxins  Pt will need permanent HD access prior to d/c

## 2017-09-28 NOTE — SWALLOW BEDSIDE ASSESSMENT ADULT - SWALLOW EVAL: PROGNOSIS
Hx contd: 9/23 Pt febrile to 101. 9/24 enteral feeds started. Per CCU transfer note 9/27: Pt's respiratory status improved, was extubated on 9/25. Pt has been breathing comfortably and saturating well on RA since that time. Pt able to move his LUE on command and proximal LLE. However no movement seen of RUE/RLE. Also has remained grossly nonverbal since extubation, though he is alert and able to follow some commands. Pt has continued to undergo HD; per d/w Renal, will likely require a Permacath. Pt was started on Lopressor however, starting ACEi/ARB is held for now per Renal. Pt currently NPO pending formal S+S evaluation. NGT in place. Pt had a repeat TTE prior to transfer; EF of 20%.

## 2017-09-28 NOTE — SWALLOW BEDSIDE ASSESSMENT ADULT - SLP GENERAL OBSERVATIONS
Patient encountered supine in bed, positioned upright for purpose of evaluation. +KFT. Evaluation performed in [place] by this clinician who is a fluent speaker. Patient nonverbal; nodded in the affirmative when asked if his name is Shahram. Pt with minimal gestural responsiveness to questions. Patient primarily did not follow directives, however in 2 instances when asked to say "ah" pt noted to attempt to do same; ? dysphonic vs poor respiratory effort.

## 2017-09-28 NOTE — PROVIDER CONTACT NOTE (MEDICATION) - ACTION/TREATMENT ORDERED:
MD made aware. MD to modify order to infuse at 650 units/hour (6.5ml/hour). MD stated to continue with daily PTT as patient has been therapeutic.

## 2017-09-28 NOTE — SWALLOW BEDSIDE ASSESSMENT ADULT - COMMENTS
HC: 9/21 Pt in new afib-> metoprolol given. Per CCU accept note pt transferred to [facility] for LHC. Pt was being treated for Pyelonephritis. He ultimately underwent Impella-assisted PCI to RCA/LAD on 9/14/2017 without significant acute complication. His HC has since been notable for intermittent dyspnea & difficulty w/ HD access, confounded by pt refusal to allow HD access catheter to be replaced. Pt underwent HD and was later noted to become unresponsive w/ low Systolic BP before becoming bradycardic. Shortly after pt went into Cardiac Arrest. He was coded for ~20 minutes before ROSC, during which and after he was noted to have VT. Was given a bolus of Amiodarone followed by infusion, up to this time there has not been a recurrence of VT noted. The pt appeared to wake and became combative, attempting to remove ETT, which required sedation. Pt's BP increased to ~160s/80s, and Levophed was weaned, though later restarted at a lower dose. In the interim, he was noted to exhibit apparent motor weakness in the RUE/RLE. A Code Stroke was called, and CT Head did not reveal any acute abnormalities. Due recent CPR, among other factors, he was deemed not to be a candidate for tPA. The cause of the pt's decompensation is unclear, but there is suspicion for hypotension leading to poor coronary perfusion followed by arrythmia, as electrolytes/acid-base disorders were not evident on kae-code labs. Will continue to treat supportively, keep pt intubated. C/w Amiodarone infusion for now given VT during and post-code. 9/22 Code stroke called x2; initial NIHSS 14, secondary 24. Seen by Neuro, CT brain showed old left MCA distribution stroke involving internal capsule and corona radiata but did not show any acute intracranial pathology. Impression: Cerebral embolism/thrombosis with cerebral infarction. Probably the L MCA distribution stroke - of undetermined etiology versus metabolic insufficiency causing reactivation of old stroke symptoms (MICROs). unable to assess patient's vocal quality as pt nonverbal

## 2017-09-28 NOTE — SWALLOW BEDSIDE ASSESSMENT ADULT - ORAL PHASE
suspected premature loss/Delayed oral transit time Delayed oral transit time/suspected premature loss

## 2017-09-28 NOTE — PROGRESS NOTE ADULT - ATTENDING COMMENTS
72 year old man transferred out of CCU, complicated course as detailed above. Electrophysiology actively involved in management as well. Patient is non-verbal, appears to be tolerating hemodialysis without complaints of chest pain. Amiodarone loading for atrial fibrillation and ventricular tachycardia. Optimizing medical regimen.

## 2017-09-29 LAB
ANION GAP SERPL CALC-SCNC: 22 MMOL/L — HIGH (ref 5–17)
ANISOCYTOSIS BLD QL: SLIGHT — SIGNIFICANT CHANGE UP
APTT BLD: 57 SEC — HIGH (ref 27.5–37.4)
APTT BLD: 62.7 SEC — HIGH (ref 27.5–37.4)
APTT BLD: 72.9 SEC — HIGH (ref 27.5–37.4)
BASOPHILS # BLD AUTO: 0.1 K/UL — SIGNIFICANT CHANGE UP (ref 0–0.2)
BASOPHILS NFR BLD AUTO: 0 % — SIGNIFICANT CHANGE UP (ref 0–2)
BUN SERPL-MCNC: 60 MG/DL — HIGH (ref 7–23)
CALCIUM SERPL-MCNC: 8.7 MG/DL — SIGNIFICANT CHANGE UP (ref 8.4–10.5)
CHLORIDE SERPL-SCNC: 92 MMOL/L — LOW (ref 96–108)
CO2 SERPL-SCNC: 21 MMOL/L — LOW (ref 22–31)
CREAT SERPL-MCNC: 5.34 MG/DL — HIGH (ref 0.5–1.3)
DACRYOCYTES BLD QL SMEAR: SLIGHT — SIGNIFICANT CHANGE UP
EOSINOPHIL # BLD AUTO: 0 K/UL — SIGNIFICANT CHANGE UP (ref 0–0.5)
EOSINOPHIL NFR BLD AUTO: 0 % — SIGNIFICANT CHANGE UP (ref 0–6)
GLUCOSE SERPL-MCNC: 257 MG/DL — HIGH (ref 70–99)
HCT VFR BLD CALC: 26.8 % — LOW (ref 39–50)
HGB BLD-MCNC: 8.8 G/DL — LOW (ref 13–17)
HYPOCHROMIA BLD QL: SLIGHT — SIGNIFICANT CHANGE UP
INR BLD: 1.75 RATIO — HIGH (ref 0.88–1.16)
LYMPHOCYTES # BLD AUTO: 4 % — LOW (ref 13–44)
LYMPHOCYTES # BLD AUTO: SIGNIFICANT CHANGE UP (ref 1–3.3)
MAGNESIUM SERPL-MCNC: 2.3 MG/DL — SIGNIFICANT CHANGE UP (ref 1.6–2.6)
MCHC RBC-ENTMCNC: 30.3 PG — SIGNIFICANT CHANGE UP (ref 27–34)
MCHC RBC-ENTMCNC: 32.9 GM/DL — SIGNIFICANT CHANGE UP (ref 32–36)
MCV RBC AUTO: 92.2 FL — SIGNIFICANT CHANGE UP (ref 80–100)
MONOCYTES # BLD AUTO: 1 K/UL — HIGH (ref 0–0.9)
MONOCYTES NFR BLD AUTO: 8 % — SIGNIFICANT CHANGE UP (ref 2–14)
NEUTROPHILS # BLD AUTO: SIGNIFICANT CHANGE UP (ref 1.8–7.4)
NEUTROPHILS NFR BLD AUTO: 87 % — HIGH (ref 43–77)
NEUTS BAND # BLD: 1 % — SIGNIFICANT CHANGE UP (ref 0–8)
OVALOCYTES BLD QL SMEAR: SLIGHT — SIGNIFICANT CHANGE UP
PHOSPHATE SERPL-MCNC: 3.7 MG/DL — SIGNIFICANT CHANGE UP (ref 2.5–4.5)
PLAT MORPH BLD: NORMAL — SIGNIFICANT CHANGE UP
PLATELET # BLD AUTO: 187 K/UL — SIGNIFICANT CHANGE UP (ref 150–400)
POIKILOCYTOSIS BLD QL AUTO: SLIGHT — SIGNIFICANT CHANGE UP
POLYCHROMASIA BLD QL SMEAR: SLIGHT — SIGNIFICANT CHANGE UP
POTASSIUM SERPL-MCNC: 4.6 MMOL/L — SIGNIFICANT CHANGE UP (ref 3.5–5.3)
POTASSIUM SERPL-SCNC: 4.6 MMOL/L — SIGNIFICANT CHANGE UP (ref 3.5–5.3)
PROTHROM AB SERPL-ACNC: 19.3 SEC — HIGH (ref 9.8–12.7)
RBC # BLD: 2.91 M/UL — LOW (ref 4.2–5.8)
RBC # FLD: 13.7 % — SIGNIFICANT CHANGE UP (ref 10.3–14.5)
RBC BLD AUTO: ABNORMAL
SCHISTOCYTES BLD QL AUTO: SLIGHT — SIGNIFICANT CHANGE UP
SODIUM SERPL-SCNC: 135 MMOL/L — SIGNIFICANT CHANGE UP (ref 135–145)
WBC # BLD: 15.9 K/UL — HIGH (ref 3.8–10.5)
WBC # FLD AUTO: 15.9 K/UL — HIGH (ref 3.8–10.5)

## 2017-09-29 PROCEDURE — 99233 SBSQ HOSP IP/OBS HIGH 50: CPT | Mod: GC

## 2017-09-29 PROCEDURE — 74230 X-RAY XM SWLNG FUNCJ C+: CPT | Mod: 26

## 2017-09-29 RX ORDER — INSULIN LISPRO 100/ML
VIAL (ML) SUBCUTANEOUS
Qty: 0 | Refills: 0 | Status: DISCONTINUED | OUTPATIENT
Start: 2017-09-29 | End: 2017-10-18

## 2017-09-29 RX ORDER — HEPARIN SODIUM 5000 [USP'U]/ML
600 INJECTION INTRAVENOUS; SUBCUTANEOUS
Qty: 25000 | Refills: 0 | Status: DISCONTINUED | OUTPATIENT
Start: 2017-09-29 | End: 2017-09-30

## 2017-09-29 RX ORDER — INSULIN LISPRO 100/ML
VIAL (ML) SUBCUTANEOUS AT BEDTIME
Qty: 0 | Refills: 0 | Status: DISCONTINUED | OUTPATIENT
Start: 2017-09-29 | End: 2017-10-18

## 2017-09-29 RX ORDER — SODIUM CHLORIDE 9 MG/ML
1000 INJECTION, SOLUTION INTRAVENOUS
Qty: 0 | Refills: 0 | Status: DISCONTINUED | OUTPATIENT
Start: 2017-09-29 | End: 2017-10-18

## 2017-09-29 RX ORDER — DEXTROSE 50 % IN WATER 50 %
25 SYRINGE (ML) INTRAVENOUS ONCE
Qty: 0 | Refills: 0 | Status: DISCONTINUED | OUTPATIENT
Start: 2017-09-29 | End: 2017-10-18

## 2017-09-29 RX ORDER — WARFARIN SODIUM 2.5 MG/1
5 TABLET ORAL ONCE
Qty: 0 | Refills: 0 | Status: COMPLETED | OUTPATIENT
Start: 2017-09-29 | End: 2017-09-29

## 2017-09-29 RX ORDER — DEXTROSE 50 % IN WATER 50 %
12.5 SYRINGE (ML) INTRAVENOUS ONCE
Qty: 0 | Refills: 0 | Status: DISCONTINUED | OUTPATIENT
Start: 2017-09-29 | End: 2017-10-18

## 2017-09-29 RX ORDER — DEXTROSE 50 % IN WATER 50 %
1 SYRINGE (ML) INTRAVENOUS ONCE
Qty: 0 | Refills: 0 | Status: DISCONTINUED | OUTPATIENT
Start: 2017-09-29 | End: 2017-10-18

## 2017-09-29 RX ORDER — GLUCAGON INJECTION, SOLUTION 0.5 MG/.1ML
1 INJECTION, SOLUTION SUBCUTANEOUS ONCE
Qty: 0 | Refills: 0 | Status: DISCONTINUED | OUTPATIENT
Start: 2017-09-29 | End: 2017-10-18

## 2017-09-29 RX ADMIN — ATORVASTATIN CALCIUM 40 MILLIGRAM(S): 80 TABLET, FILM COATED ORAL at 21:31

## 2017-09-29 RX ADMIN — Medication 3: at 06:06

## 2017-09-29 RX ADMIN — WARFARIN SODIUM 5 MILLIGRAM(S): 2.5 TABLET ORAL at 21:31

## 2017-09-29 RX ADMIN — HEPARIN SODIUM 6 UNIT(S)/HR: 5000 INJECTION INTRAVENOUS; SUBCUTANEOUS at 09:32

## 2017-09-29 RX ADMIN — CLOPIDOGREL BISULFATE 75 MILLIGRAM(S): 75 TABLET, FILM COATED ORAL at 09:49

## 2017-09-29 RX ADMIN — PANTOPRAZOLE SODIUM 40 MILLIGRAM(S): 20 TABLET, DELAYED RELEASE ORAL at 09:50

## 2017-09-29 RX ADMIN — AMIODARONE HYDROCHLORIDE 400 MILLIGRAM(S): 400 TABLET ORAL at 09:49

## 2017-09-29 RX ADMIN — AMIODARONE HYDROCHLORIDE 400 MILLIGRAM(S): 400 TABLET ORAL at 01:35

## 2017-09-29 RX ADMIN — AMIODARONE HYDROCHLORIDE 400 MILLIGRAM(S): 400 TABLET ORAL at 17:23

## 2017-09-29 RX ADMIN — Medication 25 MILLIGRAM(S): at 17:23

## 2017-09-29 RX ADMIN — Medication 5 MILLIGRAM(S): at 06:00

## 2017-09-29 RX ADMIN — TAMSULOSIN HYDROCHLORIDE 0.4 MILLIGRAM(S): 0.4 CAPSULE ORAL at 21:31

## 2017-09-29 RX ADMIN — HEPARIN SODIUM 6 UNIT(S)/HR: 5000 INJECTION INTRAVENOUS; SUBCUTANEOUS at 17:22

## 2017-09-29 RX ADMIN — Medication 5 MILLIGRAM(S): at 17:22

## 2017-09-29 RX ADMIN — Medication 25 MILLIGRAM(S): at 06:00

## 2017-09-29 RX ADMIN — Medication 81 MILLIGRAM(S): at 09:50

## 2017-09-29 RX ADMIN — Medication 2: at 13:19

## 2017-09-29 RX ADMIN — Medication 2: at 18:16

## 2017-09-29 RX ADMIN — Medication 2: at 00:26

## 2017-09-29 NOTE — PROGRESS NOTE ADULT - SUBJECTIVE AND OBJECTIVE BOX
Interval History: Patient remained stable overnight. No acute issues.     Review Of Systems:  Constitutional: [ ] Fever [ ] Chills [ ] Fatigue [ ] Weight change   HEENT: [ ] Blurred vision [ ] Eye Pain [ ] Headache [ ] Runny nose [ ] Sore Throat   Respiratory: [ ] Cough [ ] Wheezing [ ] Shortness of breath  Cardiovascular: [ ] Chest Pain [ ] Palpitations [ ] CHAMPION [ ] PND [ ] Orthopnea  Gastrointestinal: [ ] Abdominal Pain [ ] Diarrhea [ ] Constipation [ ] Hemorrhoids [ ] Nausea [ ] Vomiting  Genitourinary: [ ] Nocturia [ ] Dysuria [ ] Incontinence  Extremities: [ ] Swelling [ ] Joint Pain  Neurologic: [ ] Focal deficit [ ] Paresthesias [ ] Syncope  Lymphatic: [ ] Swelling [ ] Lymphadenopathy   Skin: [ ] Rash [ ] Ecchymoses [ ] Wounds [ ] Lesions  Psychiatry: [ ] Depression [ ] Suicidal/Homicidal Ideation [ ] Anxiety [ ] Sleep Disturbances  [x] 10 point review of systems is otherwise negative except as mentioned above            [x ]Unable to obtain    Medications:  tamsulosin 0.4 milliGRAM(s) Oral at bedtime  clopidogrel Tablet 75 milliGRAM(s) Oral daily  busPIRone 5 milliGRAM(s) Oral two times a day  atorvastatin 40 milliGRAM(s) Oral at bedtime  aspirin  chewable 81 milliGRAM(s) Oral daily  insulin lispro (HumaLOG) corrective regimen sliding scale   SubCutaneous every 6 hours  metoprolol 25 milliGRAM(s) Enteral Tube two times a day  amiodarone    Tablet 400 milliGRAM(s) Oral every 8 hours  epoetin georgie Injectable 86149 Unit(s) IV Push <User Schedule>  pantoprazole  Injectable 40 milliGRAM(s) IV Push daily  heparin  Infusion 600 Unit(s)/Hr IV Continuous <Continuous>  warfarin 5 milliGRAM(s) Oral once    PMH/PSH/FH/SH: [ ] Unchanged  Vitals:  T(C): 36.7 (17 @ 04:37), Max: 37.2 (17 @ 10:15)  HR: 88 (17 @ 04:37) (76 - 101)  BP: 116/74 (17 @ 04:37) (113/77 - 124/75)  BP(mean): --  RR: 18 (17 @ 04:37) (18 - 20)  SpO2: 97% (17 @ 04:37) (95% - 100%)  Wt(kg): --  Daily     Daily Weight in k (29 Sep 2017 04:37)  I&O's Summary    28 Sep 2017 07:01  -  29 Sep 2017 07:00  --------------------------------------------------------  IN: 378 mL / OUT: 1900 mL / NET: -1522 mL        Physical Exam:  GENERAL: NAD; nonverbal  HEAD: NC/AT  NECK: Supple  CHEST/LUNG: Grossly CTAB anteriorly, No wheeze appreciated  HEART: RRR  ABDOMEN: +BS, Soft, Nontender, No rigidity  EXTREMITIES: No significant peripheral edema  PSYCH: Calm  NEUROLOGY: PERRLA, Moves LUE on command, Able to move proximal LLE, No movement seen of RUE/RLE  SKIN:  ecchymosis on left proximal anterior thigh which extends to LLQ/Left flank    Labs:                        8.8    15.9  )-----------( 187      ( 29 Sep 2017 06:48 )             26.8         135  |  92<L>  |  60<H>  ----------------------------<  257<H>  4.6   |  21<L>  |  5.34<H>    Ca    8.7      29 Sep 2017 06:48  Phos  3.7       Mg     2.3         TPro  6.9  /  Alb  2.9<L>  /  TBili  0.5  /  DBili  x   /  AST  54<H>  /  ALT  45  /  AlkPhos  110      PT/INR - ( 29 Sep 2017 06:48 )   PT: 19.3 sec;   INR: 1.75 ratio         PTT - ( 29 Sep 2017 06:48 )  PTT:72.9 sec    Echo:   Confirmed on  2017 - 09:46:37 by Tiffany Mancuso M.D.  Technically difficult, limited study to evaluate the  pericardium and the left ventricle.  1. Endocardial visualization enhanced with intravenous  injection of echo contrast (Definity). Severe segmental  left ventricular systolic dysfunction. EF approximately  20%. The inferior, anterior, septal, and apical cap are  akinetic. The lateral and inferolateral walls are the best  preserved. No left ventricular thrombus.  2. The right ventricle is not well visualized; grossly  normal right ventricular systolic function.  3. No pericardial effusion seen.  *** Compared with echocardiogram of 2017, severe LV  systolic dysfunction is seen. The LV is better viusalized  on today's study which may account for variation of LV  function.  ------------------------------------------------------------------------      Stress Testing:     Cath:    Imaging:    Interpretation of Telemetry: SR 8090 Interval History: Patient remained stable overnight. No acute issues.     Review Of Systems:  Constitutional: [ ] Fever [ ] Chills [ ] Fatigue [ ] Weight change   HEENT: [ ] Blurred vision [ ] Eye Pain [ ] Headache [ ] Runny nose [ ] Sore Throat   Respiratory: [ ] Cough [ ] Wheezing [ ] Shortness of breath  Cardiovascular: [ ] Chest Pain [ ] Palpitations [ ] CHAMPION [ ] PND [ ] Orthopnea  Gastrointestinal: [ ] Abdominal Pain [ ] Diarrhea [ ] Constipation [ ] Hemorrhoids [ ] Nausea [ ] Vomiting  Genitourinary: [ ] Nocturia [ ] Dysuria [ ] Incontinence  Extremities: [ ] Swelling [ ] Joint Pain  Neurologic: [ ] Focal deficit [ ] Paresthesias [ ] Syncope  Lymphatic: [ ] Swelling [ ] Lymphadenopathy   Skin: [ ] Rash [ ] Ecchymoses [ ] Wounds [ ] Lesions  Psychiatry: [ ] Depression [ ] Suicidal/Homicidal Ideation [ ] Anxiety [ ] Sleep Disturbances  [x] 10 point review of systems is otherwise negative except as mentioned above            [x ]Unable to obtain    Medications:  tamsulosin 0.4 milliGRAM(s) Oral at bedtime  clopidogrel Tablet 75 milliGRAM(s) Oral daily  busPIRone 5 milliGRAM(s) Oral two times a day  atorvastatin 40 milliGRAM(s) Oral at bedtime  aspirin  chewable 81 milliGRAM(s) Oral daily  insulin lispro (HumaLOG) corrective regimen sliding scale   SubCutaneous every 6 hours  metoprolol 25 milliGRAM(s) Enteral Tube two times a day  amiodarone    Tablet 400 milliGRAM(s) Oral every 8 hours  epoetin georgie Injectable 20499 Unit(s) IV Push <User Schedule>  pantoprazole  Injectable 40 milliGRAM(s) IV Push daily  heparin  Infusion 600 Unit(s)/Hr IV Continuous <Continuous>  warfarin 5 milliGRAM(s) Oral once    PMH/PSH/FH/SH: [ ] Unchanged  Vitals:  T(C): 36.7 (17 @ 04:37), Max: 37.2 (17 @ 10:15)  HR: 88 (17 @ 04:37) (76 - 101)  BP: 116/74 (17 @ 04:37) (113/77 - 124/75)  BP(mean): --  RR: 18 (17 @ 04:37) (18 - 20)  SpO2: 97% (17 @ 04:37) (95% - 100%)  Wt(kg): --  Daily     Daily Weight in k (29 Sep 2017 04:37)  I&O's Summary    28 Sep 2017 07:01  -  29 Sep 2017 07:00  --------------------------------------------------------  IN: 378 mL / OUT: 1900 mL / NET: -1522 mL        Physical Exam:  GENERAL: NAD; nonverbal  HEAD: NC/AT  NECK: Supple  CHEST/LUNG: Grossly CTAB anteriorly, No wheeze appreciated  HEART: RRR  ABDOMEN: +BS, Soft, Nontender, No rigidity  EXTREMITIES: No significant peripheral edema  PSYCH: Calm  NEUROLOGY: PERRLA, Moves LUE on command, Able to move proximal LLE, No movement seen of RUE/RLE  SKIN:  ecchymosis on left proximal anterior thigh which extends to LLQ/Left flank    Labs:                        8.8    15.9  )-----------( 187      ( 29 Sep 2017 06:48 )             26.8         135  |  92<L>  |  60<H>  ----------------------------<  257<H>  4.6   |  21<L>  |  5.34<H>    Ca    8.7      29 Sep 2017 06:48  Phos  3.7       Mg     2.3         TPro  6.9  /  Alb  2.9<L>  /  TBili  0.5  /  DBili  x   /  AST  54<H>  /  ALT  45  /  AlkPhos  110      PT/INR - ( 29 Sep 2017 06:48 )   PT: 19.3 sec;   INR: 1.75 ratio         PTT - ( 29 Sep 2017 06:48 )  PTT:72.9 sec    Echo:   Confirmed on  2017 - 09:46:37 by Tiffany Mancuso M.D.  Technically difficult, limited study to evaluate the  pericardium and the left ventricle.  1. Endocardial visualization enhanced with intravenous  injection of echo contrast (Definity). Severe segmental  left ventricular systolic dysfunction. EF approximately  20%. The inferior, anterior, septal, and apical cap are  akinetic. The lateral and inferolateral walls are the best  preserved. No left ventricular thrombus.  2. The right ventricle is not well visualized; grossly  normal right ventricular systolic function.  3. No pericardial effusion seen.  *** Compared with echocardiogram of 2017, severe LV  systolic dysfunction is seen. The LV is better viusalized  on today's study which may account for variation of LV  function.  ------------------------------------------------------------------------      Interpretation of Telemetry: SR 80-90

## 2017-09-29 NOTE — SWALLOW VFSS/MBS ASSESSMENT ADULT - DELAYED INITIATION OF THE PHARYNGEAL SWALLOW (IN SECONDS)
~1.6s up to ~2.4s ~1.8s delay with primary swallow, ~7.5s delay with spontaneous repeat swallow of oral residue ~1.3s delay with primary swallow, ~1.8 with secondary swallow

## 2017-09-29 NOTE — SWALLOW VFSS/MBS ASSESSMENT ADULT - DIAGNOSTIC IMPRESSIONS
Patient presents with oropharyngeal dysphagia characterized by impaired oral management, premature bolus spillover, penetration with thick puree subsequently cleared with liquid wash, silent laryngeal penetration with nectar thickened liquids, and with thin liquids without clearance. Patient with weak cued cough, ineffective in clearing material from laryngeal vestibule. Delayed cough noted post study suggestive of possible aspiration of penetrant. Although nectar thickened liquid appears to be cleared from vestibule, given oral deficits patient is safest managed conservatively at this time.     Disorders include: reduced lingual strength/ROM/Rate of motion, reduced BOT to posterior pharyngeal wall contact, delay in trigger of the swallow reflex, reduced hyo-laryngeal excursion, reduced laryngeal closure, reduced pharyngeal contractility, reduced supraglottic sensation, reduced subglottic sensation. Patient presents with oropharyngeal dysphagia characterized by impaired oral management, premature bolus spillover, penetration with thick puree subsequently cleared with liquid wash, silent laryngeal penetration with nectar thickened liquids, and with thin liquids without clearance. Patient with weak cued cough, ineffective in clearing material from laryngeal vestibule. Delayed cough noted post study suggestive of possible aspiration of penetrant. Although nectar thickened liquid appears to be cleared from vestibule, given oral deficits patient is safest managed conservatively at this time.     Disorders include: reduced lingual strength/ROM/Rate of motion, reduced tongue to palate contact, delay in trigger of the swallow reflex, mildly reduced hyo-laryngeal elevation, reduced laryngeal closure, and s/s of reduced supraglottic sensation. Patient presents with oropharyngeal dysphagia characterized by impaired oral management, premature bolus spillover, penetration with thick puree subsequently cleared with liquid wash, silent laryngeal penetration with nectar thickened liquids, and with thin liquids without clearance. Delayed cough noted post study suggestive of possible aspiration of thin liquid. Although nectar thickened liquid appears to be cleared from vestibule, given oral deficits patient is safest managed conservatively at this time. Patient not a candidate for compensatory strategies 2/2 mentation and impaired command following.     Disorders include: reduced lingual strength/ROM/Rate of motion, reduced tongue to palate contact, delay in trigger of the swallow reflex, mildly reduced hyo-laryngeal elevation, reduced laryngeal closure, and s/s of reduced supraglottic sensation.

## 2017-09-29 NOTE — SWALLOW VFSS/MBS ASSESSMENT ADULT - ORAL PHASE COMMENTS
episode of mild-moderate spillover to the level of the valleculae up to ~10s oral transit time  +mild spillover to the level of the valleculae +mild spillover of oral residue to the level of the valleculae +trace-mild spillover of oral residue to the level of the valleculae moderate-maximal spillover to the level of the valleculae

## 2017-09-29 NOTE — PROGRESS NOTE ADULT - SUBJECTIVE AND OBJECTIVE BOX
Patient is a 72y old  Male who presents with a chief complaint of s/p cardiac cath (22 Sep 2017 12:34)        SUBJECTIVE / OVERNIGHT EVENTS:      MEDICATIONS  (STANDING):  tamsulosin 0.4 milliGRAM(s) Oral at bedtime  clopidogrel Tablet 75 milliGRAM(s) Oral daily  busPIRone 5 milliGRAM(s) Oral two times a day  atorvastatin 40 milliGRAM(s) Oral at bedtime  aspirin  chewable 81 milliGRAM(s) Oral daily  insulin lispro (HumaLOG) corrective regimen sliding scale   SubCutaneous every 6 hours  metoprolol 25 milliGRAM(s) Enteral Tube two times a day  amiodarone    Tablet 400 milliGRAM(s) Oral every 8 hours  epoetin georgie Injectable 62388 Unit(s) IV Push <User Schedule>  pantoprazole  Injectable 40 milliGRAM(s) IV Push daily  heparin  Infusion 650 Unit(s)/Hr (6.5 mL/Hr) IV Continuous <Continuous>    MEDICATIONS  (PRN):      T(C): 36.7 (09-29-17 @ 04:37), Max: 37.2 (09-28-17 @ 10:15)  HR: 88 (09-29-17 @ 04:37) (76 - 101)  BP: 116/74 (09-29-17 @ 04:37) (113/77 - 124/75)  RR: 18 (09-29-17 @ 04:37) (18 - 20)  SpO2: 97% (09-29-17 @ 04:37) (95% - 100%)  CAPILLARY BLOOD GLUCOSE  263 (29 Sep 2017 06:04)  244 (29 Sep 2017 00:14)  269 (28 Sep 2017 17:55)  124 (28 Sep 2017 12:00)        I&O's Summary    28 Sep 2017 07:01  -  29 Sep 2017 07:00  --------------------------------------------------------  IN: 378 mL / OUT: 1900 mL / NET: -1522 mL        PHYSICAL EXAM  GENERAL: NAD, well-developed  HEAD:  Atraumatic, Normocephalic  EYES: EOMI, PERRLA, conjunctiva and sclera clear  NECK: Supple, No JVD  CHEST/LUNG: Clear to auscultation bilaterally; No wheeze  HEART: Regular rate and rhythm; No murmurs, rubs, or gallops  ABDOMEN: Soft, Nontender, Nondistended; Bowel sounds present  EXTREMITIES:  2+ Peripheral Pulses, No clubbing, cyanosis, or edema  PSYCH: AAOx3  SKIN: No rashes or lesions    LABS:                        8.8    15.9  )-----------( 187      ( 29 Sep 2017 06:48 )             26.8     09-28    136  |  93<L>  |  79<H>  ----------------------------<  225<H>  4.2   |  21<L>  |  7.02<H>    Ca    8.5      28 Sep 2017 07:09  Phos  4.8     09-28  Mg     2.7     09-28    TPro  6.9  /  Alb  2.9<L>  /  TBili  0.5  /  DBili  x   /  AST  54<H>  /  ALT  45  /  AlkPhos  110  09-28    PT/INR - ( 29 Sep 2017 06:48 )   PT: 19.3 sec;   INR: 1.75 ratio         PTT - ( 29 Sep 2017 06:48 )  PTT:72.9 sec          RADIOLOGY & ADDITIONAL TESTS:    Imaging Personally Reviewed:  Consultant(s) Notes Reviewed:    Care Discussed with Consultants/Other Providers: Patient is a 72y old  Male who presents with a chief complaint of s/p cardiac cath (22 Sep 2017 12:34)        SUBJECTIVE / OVERNIGHT EVENTS: Pt enroute to [facility]. S/p HD yesterday. EP spoke with daughter yesterday who reports she would like palliative measures. As per nursing, pt remains non verbal.       MEDICATIONS  (STANDING):  tamsulosin 0.4 milliGRAM(s) Oral at bedtime  clopidogrel Tablet 75 milliGRAM(s) Oral daily  busPIRone 5 milliGRAM(s) Oral two times a day  atorvastatin 40 milliGRAM(s) Oral at bedtime  aspirin  chewable 81 milliGRAM(s) Oral daily  insulin lispro (HumaLOG) corrective regimen sliding scale   SubCutaneous every 6 hours  metoprolol 25 milliGRAM(s) Enteral Tube two times a day  amiodarone    Tablet 400 milliGRAM(s) Oral every 8 hours  epoetin georgie Injectable 52140 Unit(s) IV Push <User Schedule>  pantoprazole  Injectable 40 milliGRAM(s) IV Push daily  heparin  Infusion 650 Unit(s)/Hr (6.5 mL/Hr) IV Continuous <Continuous>    MEDICATIONS  (PRN):      T(C): 36.7 (09-29-17 @ 04:37), Max: 37.2 (09-28-17 @ 10:15)  HR: 88 (09-29-17 @ 04:37) (76 - 101)  BP: 116/74 (09-29-17 @ 04:37) (113/77 - 124/75)  RR: 18 (09-29-17 @ 04:37) (18 - 20)  SpO2: 97% (09-29-17 @ 04:37) (95% - 100%)  CAPILLARY BLOOD GLUCOSE  263 (29 Sep 2017 06:04)  244 (29 Sep 2017 00:14)  269 (28 Sep 2017 17:55)  124 (28 Sep 2017 12:00)        I&O's Summary    28 Sep 2017 07:01  -  29 Sep 2017 07:00  --------------------------------------------------------  IN: 378 mL / OUT: 1900 mL / NET: -1522 mL        PHYSICAL EXAM  GENERAL: NAD, well-developed  HEAD:  Atraumatic, Normocephalic  EYES: EOMI, PERRLA, conjunctiva and sclera clear  NECK: Supple, No JVD  CHEST/LUNG: Clear to auscultation bilaterally; No wheeze  HEART: Regular rate and rhythm; No murmurs, rubs, or gallops  ABDOMEN: Soft, Nontender, Nondistended; Bowel sounds present  EXTREMITIES:  2+ Peripheral Pulses, No clubbing, cyanosis, or edema  PSYCH: AAOx3  SKIN: No rashes or lesions    LABS:                                 8.8    15.9  )-----------( 187      ( 29 Sep 2017 06:48 )             26.8     09-29    135  |  92<L>  |  60<H>  ----------------------------<  257<H>  4.6   |  21<L>  |  5.34<H>    Ca    8.7      29 Sep 2017 06:48  Phos  3.7     09-29  Mg     2.3     09-29    TPro  6.9  /  Alb  2.9<L>  /  TBili  0.5  /  DBili  x   /  AST  54<H>  /  ALT  45  /  AlkPhos  110  09-28    PT/INR - ( 29 Sep 2017 06:48 )   PT: 19.3 sec;   INR: 1.75 ratio         PTT - ( 29 Sep 2017 06:48 )  PTT:72.9 sec          RADIOLOGY & ADDITIONAL TESTS:    Imaging Personally Reviewed:  Consultant(s) Notes Reviewed:  EP, Cardio, Nephro  Care Discussed with Consultants/Other Providers:

## 2017-09-29 NOTE — PROGRESS NOTE ADULT - PROBLEM SELECTOR PLAN 2
-c/w PO Amio, load to 10g (completes in 5 days)  -c/w heparin gtt, bridge to coumadin  - dose 5mg coumadin  -daily INR  -c/w Tele monitoring  -Cards following -c/w PO Amio, load to 10g (completes in 3 days)  -c/w heparin gtt, bridge to coumadin  - dose 5mg coumadin  -daily INR  -c/w Tele monitoring  -Cards following

## 2017-09-29 NOTE — SWALLOW VFSS/MBS ASSESSMENT ADULT - ADDITIONAL INFORMATION
+calcification of thyroid and arytenoid cartilages  +? calcification of vessels in lateral neck vs other  +KFT  +small cervical osteophytes    Patient with reduced neck support; exam performed in partial neck flexion. PROM provided intermittently to improve visualization, however pt unable to independently maintain head in midline position.

## 2017-09-29 NOTE — PROGRESS NOTE ADULT - SUBJECTIVE AND OBJECTIVE BOX
JIMI BUCHANAN  72y  Male    Patient is a 72y old  Male who presents with a chief complaint of s/p cardiac cath (22 Sep 2017 12:34)    lethargic and non verbal.  Has a temporary dialysis catheter.       HPI:  This is 73 yo male  who is [place]uvian speaking male with PMH CAD s/p 12-14  stents placed [id number], T2 DM. 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 Patient was admitted  with pyelonephritis started on ceftriaxone. 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 Patient is now s/p james making no urine, 2nd dialysis today via femoral catheter for creatine of . Patient treated in the ICU for  NSTEMI with TWI precordial and lateral leads  with increased troponin I  0.120>0.193 . Patient was also started on Heparin gtt, loaded with ASa and plavix, TTE with EF 35 %. He was intubated with hypoxic respiratory distress from pulm edema from the acute NSTEMI as well as treated for  Hypertensive emergency with NTG gtt.  He is now transfered to [facility]  for cardiac angiogram    PAST MEDICAL & SURGICAL HISTORY:  CVA (cerebral vascular accident)  COPD (chronic obstructive pulmonary disease)  BPH (benign prostatic hyperplasia)  Bipolar affective disorder  CKD (chronic kidney disease)  Pancreatitis  Hypertension  Dyslipidemia  Diabetes mellitus  Coronary artery disease  History of cardiac catheterization          PHYSICAL EXAM:    T(C): 36.6 (09-29-17 @ 13:17), Max: 37.1 (09-28-17 @ 14:48)  HR: 81 (09-29-17 @ 13:17) (81 - 95)  BP: 123/82 (09-29-17 @ 13:17) (113/77 - 123/82)  RR: 19 (09-29-17 @ 13:17) (18 - 20)  SpO2: 98% (09-29-17 @ 13:17) (97% - 100%)  Wt(kg): --    I&O's Detail    28 Sep 2017 07:01  -  29 Sep 2017 07:00  --------------------------------------------------------  IN:    Enteral Tube Flush: 250 mL    heparin Infusion: 78 mL    Nepro with Carb Steady: 50 mL  Total IN: 378 mL    OUT:    Other: 1900 mL  Total OUT: 1900 mL    Total NET: -1522 mL          Respiratory: clear anteriorly, decreased BS at bases  Cardiovascular: S1 S2  Gastrointestinal: soft NT ND +BS  Extremities: edema   Neuro: poorly arousable.    MEDICATIONS  (STANDING):  tamsulosin 0.4 milliGRAM(s) Oral at bedtime  clopidogrel Tablet 75 milliGRAM(s) Oral daily  busPIRone 5 milliGRAM(s) Oral two times a day  atorvastatin 40 milliGRAM(s) Oral at bedtime  aspirin  chewable 81 milliGRAM(s) Oral daily  insulin lispro (HumaLOG) corrective regimen sliding scale   SubCutaneous every 6 hours  metoprolol 25 milliGRAM(s) Enteral Tube two times a day  amiodarone    Tablet 400 milliGRAM(s) Oral every 8 hours  epoetin georgie Injectable 24100 Unit(s) IV Push <User Schedule>  pantoprazole  Injectable 40 milliGRAM(s) IV Push daily  heparin  Infusion 600 Unit(s)/Hr (6 mL/Hr) IV Continuous <Continuous>  warfarin 5 milliGRAM(s) Oral once    MEDICATIONS  (PRN):                            8.8    15.9  )-----------( 187      ( 29 Sep 2017 06:48 )             26.8       09-29    135  |  92<L>  |  60<H>  ----------------------------<  257<H>  4.6   |  21<L>  |  5.34<H>    Ca    8.7      29 Sep 2017 06:48  Phos  3.7     09-29  Mg     2.3     09-29    TPro  6.9  /  Alb  2.9<L>  /  TBili  0.5  /  DBili  x   /  AST  54<H>  /  ALT  45  /  AlkPhos  110  09-28      < from: Xray Chest 1 View AP- PORTABLE-Urgent (09.28.17 @ 16:54) >  INTERPRETATION:  HISTORY: NG tube placement    TECHNIQUE: Portable frontal chest x-ray    COMPARISON: Chest x-ray from September 26, 2017    FINDINGS:    Nasogastric tube seen coursing below the diaphragm, with tip in stomach.  Right-sided central venous catheter with tip in SVC.  Trace left pleural effusion.  No focal consolidation.  There is no pneumothorax.   The cardiomediastinal silhouette cannotbe adequately assessed on this   projection.    IMPRESSION:   Nasogastric tube seen coursing below the diaphragm, with tip in stomach.  Trace left pleural effusion.

## 2017-09-29 NOTE — CHART NOTE - NSCHARTNOTEFT_GEN_A_CORE
Source: Patient [ ]    Family [ ]     other [x]chart, Nurse    Diet : ENTERAL,NPOENTERAL  TEAM 4 STATES PT WILL BE CONTINUING ON HD INTERMITTENTLY AS PER NEPHROLOGY      Patient reports [ ] nausea  [ ] vomiting [ ] diarrhea [ ] constipation  [ ]chewing problems [ ] swallowing issues  [X ] other: PT NON-VERBAL     PO intake:  < 50% [ ] 50-75% [ ]   % [ ]  other :     Source for PO intake [ ] Patient [ ] family [ ] chart [ ] staff [ ] other     Enteral /Parenteral Nutrition: NEPRO @ 50ML/HR X 24 HR. PROVIDES 2160KCAL/97GRAMS PROTEIN. 27KCAL/KG,1.2GRAMS PROTEIN/KG.  BASED ON CURRENT WEIGHT 79KG/BMI 27       Current Weight: 79KG/174.1 POUNDS  % Weight Change:9% LOSS SINCE 9/13    Pertinent Medications: MEDICATIONS  (STANDING):  tamsulosin 0.4 milliGRAM(s) Oral at bedtime  clopidogrel Tablet 75 milliGRAM(s) Oral daily  busPIRone 5 milliGRAM(s) Oral two times a day  atorvastatin 40 milliGRAM(s) Oral at bedtime  aspirin  chewable 81 milliGRAM(s) Oral daily  insulin lispro (HumaLOG) corrective regimen sliding scale   SubCutaneous every 6 hours  metoprolol 25 milliGRAM(s) Enteral Tube two times a day  amiodarone    Tablet 400 milliGRAM(s) Oral every 8 hours  epoetin georgie Injectable 88147 Unit(s) IV Push <User Schedule>  pantoprazole  Injectable 40 milliGRAM(s) IV Push daily  heparin  Infusion 600 Unit(s)/Hr (6 mL/Hr) IV Continuous <Continuous>  warfarin 5 milliGRAM(s) Oral once    MEDICATIONS  (PRN):    Pertinent Labs:  09-29 Na135 mmol/L Glu 257 mg/dL<H> K+ 4.6 mmol/L Cr  5.34 mg/dL<H> BUN 60 mg/dL<H> Phos 3.7 mg/dL       CAPILLARY BLOOD GLUCOSE  263 (29 Sep 2017 06:04)  244 (29 Sep 2017 00:14)  269 (28 Sep 2017 17:55)  124 (28 Sep 2017 12:00)        Hemoglobin A1C, Whole Blood: 6.5 % (09-13 @ 10:32)           Skin: +1 RIGHT/LEFT LEG, +2 RIGHT ARM    Estimated Needs:   [X ] no change since previous assessment  [ ] recalculated:       Previous Nutrition Diagnosis:     [X ] Inadequate PROTEIN-Energy Intake [ ]Inadequate Oral Intake [ ] Excessive Energy Intake     [ ] Underweight [ ] Increased Nutrient Needs [ ] Overweight/Obesity     [ ] Altered GI Function [ ] Unintended Weight Loss [ ] Food & Nutrition Related Knowledge Deficit [ ] Malnutrition          Nutrition Diagnosis is [X ] ongoing  [ ] resolved [ ] not applicable -BEING ADDRESSED WITH TUBE FEEDS         New Nutrition Diagnosis: [X ] not applicable    [ ] Inadequate Protein Energy Intake [ ]Inadequate Oral Intake [ ] Excessive Energy Intake     [ ] Underweight [ ] Increased Nutrient Needs [ ] Overweight/Obesity     [ ] Altered GI Function [ ] Unintended Weight Loss [ ] Food & Nutrition Related Knowledge Deficit[ ] Limited Adherence to nutrition related recommendations [ ] Malnutrition  [ ] other: Free text       Related to:      As evidenced by:      Interventions:     Recommend    [ ] Change Diet To:    [ ] Nutrition Supplement    [ X] Nutrition Support-CONTINUE CURRENT NEPRO @ 50ML/HR X 24 HOURS    [ ] Other:        Monitoring and Evaluation:     [ ] PO intake [ ] Tolerance to diet prescription [ X] weights [ X] follow up per protocol    [ ] other:

## 2017-09-29 NOTE — PROGRESS NOTE ADULT - PROBLEM SELECTOR PLAN 4
-s/p Impella-assisted Cath with BABAR x5 to LAD/RCA  -c/w DAPT, Statin, and BetaBlocker  -holding ACEI given LIN/CKD, discuss with Renal -s/p Impella-assisted Cath with BABAR x5 to LAD/RCA  -c/w DAPT, Statin, and BetaBlocker  -holding ACEI given LIN/CKD, discuss with Renal  - cards following

## 2017-09-29 NOTE — PROGRESS NOTE ADULT - PROBLEM SELECTOR PLAN 3
-NPO given recent CVA and intubations  -S&S assessment pending  -NGT for meds and feeds  - s/s eval appreciated, pendign MBS -NPO given recent CVA and intubations  -NGT for meds and feeds  - s/s eval appreciated, pendign MBS

## 2017-09-29 NOTE — SWALLOW VFSS/MBS ASSESSMENT ADULT - RECOMMENDED FEEDING/EATING TECHNIQUES
crush medication (when feasible)/alternate food with liquid/small sips/bites/no straws/position upright (90 degrees)

## 2017-09-29 NOTE — PROGRESS NOTE ADULT - PROBLEM SELECTOR PLAN 7
-likely component of chronic disease/inflammation, EPO per Renal  -no overt bleeding at this time, FOBT negative, monitor for bleeding on triple AC  -trend H/H, relatively stable  - likely Anemia of Chronic disease in setting of ESRD and low TIBC

## 2017-09-29 NOTE — PROGRESS NOTE ADULT - ATTENDING COMMENTS
72 year old man transferred out of CCU, complicated course as detailed above. Electrophysiology consideration for ICD, but not anticipated at this time. Patient is non-verbal, appears to be tolerating hemodialysis without complaints of chest pain. Amiodarone loading for atrial fibrillation and ventricular tachycardia. Optimizing medical regimen.

## 2017-09-29 NOTE — SWALLOW VFSS/MBS ASSESSMENT ADULT - THE ABOVE FINDINGS WERE DISCUSSED WITH
Nursing/MD Lima, RN Yamile, patient's daughter Anisa ([phone]), BLAINE Burton/Patient/Family/Physician Physician/Family/MD Lima, RN Yaimle, patient's daughter Anisa ([phone]), BLAINE Burton/Patient/Nursing

## 2017-09-29 NOTE — SWALLOW VFSS/MBS ASSESSMENT ADULT - ROSENBEK'S PENETRATION ASPIRATION SCALE
(3) contrast remains above the vocal cords, visible residue remains (penetration) (1) no aspiration, contrast does not enter airway (2) contrast enters airway, remains above the vocal cords, no residue remains (penetration)

## 2017-09-29 NOTE — SWALLOW VFSS/MBS ASSESSMENT ADULT - SLP PERTINENT HISTORY OF CURRENT PROBLEM
71 yo M PMH CAD s/p 12-14 stents placed [id number], T2 DM. 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 Pt was admitted w/ pyelonephritis started on ceftriaxone. 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 Pt is now s/p james making no urine. Treated in the ICU for NSTEMI w/ TWI precordial and lateral leads w/ increased troponin I. Also started on Hep gtt, loaded with ASa and plavix, TTE with EF 35 %. He was intubated w/ hypoxic respiratory distress from pulm edema from the acute NSTEMI as well as treated for hypertensive emergency with NTG gtt. Now transferred to [facility] for cardiac angiogram.

## 2017-09-29 NOTE — SWALLOW VFSS/MBS ASSESSMENT ADULT - PHARYNGEAL PHASE COMMENTS
No penetration noted initially, however following back to back trials given off flouro to assess for decompensation, trace material noted along the laryngeal surface of the epiglottis upon reinitiation of flouro, indicative of silent laryngeal penetration, without retrieval. Material did not descend further into laryngeal vestibule, and was subsequently cleared. Cued cough weak; ineffective in clearing material from laryngeal vestibule. Patient with latent cough following completion of exam, possibly indicative of aspiration of thin liquid.

## 2017-09-29 NOTE — PROGRESS NOTE ADULT - PROBLEM SELECTOR PLAN 6
-euvolemic on exam, systolic function worsened s/p NSTEMI/arrest  -per EP, daughter declined AICD and would like to pursue palliative  -fluid removal by intermittent HD

## 2017-09-29 NOTE — SWALLOW VFSS/MBS ASSESSMENT ADULT - LARYNGEAL PENETRATION DURING THE SWALLOW - SILENT
Trace/over the laryngeal surface of the epiglottis and over the arytenoids, deep, without retrieval/Mild Mild/Trace/shallow, over the arytenoids, appears to be retrieved

## 2017-09-29 NOTE — SWALLOW VFSS/MBS ASSESSMENT ADULT - RESIDUE IN LARYNGEAL VESTIBULE / VENTRICLE
Trace/along the laryngeal surface of the epiglottis Trace/along the laryngeal surface of the epiglottis, cleared on subsequent trials

## 2017-09-29 NOTE — PROGRESS NOTE ADULT - ASSESSMENT
73yo M with PMH of extensive CAD, T2DM, CKD, prior CVA transferred from OSH for NSTEMI s/p Impella-assisted LHC w/ BABAR x5 and LIN requiring HD.   Also had PEA/Vtach arrest with ROSC c/b new embolic CVA.  ESRD on HD, for dialysis in AM via right perma cath   overall prognosis is very poor;  comfort care is more appropriate.

## 2017-09-29 NOTE — PROGRESS NOTE ADULT - PROBLEM SELECTOR PLAN 1
-multifocal infarcts concerning for embolic strokes  -c/w Heparin gtt, goal PTT 50-70, bridge to Coumadin  - dose 5mg coumadin  - Atorvastin 40mg  - S&S assessment pending, NPO for now  - PT/OT/Neuro following  - NGT in place -multifocal infarcts concerning for embolic strokes  -c/w Heparin gtt, goal PTT 50-70, bridge to Coumadin  - dose 5mg coumadin  - Atorvastin 40mg  - S&S assessment, remain NPO, f/u MBS  - PT/OT/Neuro following  - NGT in place

## 2017-09-29 NOTE — PROGRESS NOTE ADULT - ASSESSMENT
71 y/o M w/ a PMHx of extensive CAD, T2DM, ESRD new HD, prior CVA w/ multiple sites of infarcts who was transferred from OSH for NSTEMI s/p impella assisted LHC and pyelonephritis with course c/b obstructive nephropathy requiring HD transferred from floor to CCU after cardiac arrest, v-tach and subsequent ROSC w/ hospital course further c/b embolic CVA (9/23). Pt remains status quo today.  He has a guarded prognosis but otherwise stable. Family is hopeful that patient will regain some function which is possible.     # NSTEMI c/b Vtach arrest s/p Impella-assisted Cath with BABAR x5 to LAD/RCA -- stable on tele w/ no sig arrhythmias.   - c/w DAPT, Lipitor 40mg QHS, and Lopressor 25mg PO BI. .   - holding ACEI given LIN/CKD. f/u with renal regarding further dialysis. Once renal function stabilizes we can introduce a regimen to help with his EF in addition to the beta-blocker.  - EP had discussion with family. No plan for ICD at this time for secondary prevention    # Atrial fibrillation and flutter- relatively rate controlled and remains hemodynamically stable  - c/w PO Amio, load to 10g (completes in 5 days)  - c/w heparin gtt, bridge to coumadin

## 2017-09-29 NOTE — SWALLOW VFSS/MBS ASSESSMENT ADULT - RECOMMENDED CONSISTENCY
Dysphagia 1 with Honey thickened liquids Dysphagia 1 with Honey thickened liquids with all fluids VIA TEASPOON ONLY Dysphagia 1 with Honey thickened liquids with all fluids VIA TEASPOON ONLY. ***NO MOLDED PUREES.

## 2017-09-29 NOTE — SWALLOW VFSS/MBS ASSESSMENT ADULT - NS SWALLOW VFSS REC ASPIR MON
Monitor for s/s aspiration/laryngeal penetration. If noted:  D/C p.o. intake, provide non-oral nutrition/hydration/meds, and contact this service @ [phone]/jodi voice/pneumonia/throat clearing/upper respiratory infection/change of breathing pattern/cough/fever

## 2017-09-30 LAB
ANION GAP SERPL CALC-SCNC: 25 MMOL/L — HIGH (ref 5–17)
APTT BLD: 58.9 SEC — HIGH (ref 27.5–37.4)
BUN SERPL-MCNC: 93 MG/DL — HIGH (ref 7–23)
CALCIUM SERPL-MCNC: 9 MG/DL — SIGNIFICANT CHANGE UP (ref 8.4–10.5)
CHLORIDE SERPL-SCNC: 91 MMOL/L — LOW (ref 96–108)
CO2 SERPL-SCNC: 21 MMOL/L — LOW (ref 22–31)
CREAT SERPL-MCNC: 7.58 MG/DL — HIGH (ref 0.5–1.3)
GAS PNL BLDA: SIGNIFICANT CHANGE UP
GLUCOSE SERPL-MCNC: 194 MG/DL — HIGH (ref 70–99)
HCT VFR BLD CALC: 25.7 % — LOW (ref 39–50)
HGB BLD-MCNC: 8.5 G/DL — LOW (ref 13–17)
INR BLD: 2.46 RATIO — HIGH (ref 0.88–1.16)
MAGNESIUM SERPL-MCNC: 2.8 MG/DL — HIGH (ref 1.6–2.6)
MCHC RBC-ENTMCNC: 30.7 PG — SIGNIFICANT CHANGE UP (ref 27–34)
MCHC RBC-ENTMCNC: 33.3 GM/DL — SIGNIFICANT CHANGE UP (ref 32–36)
MCV RBC AUTO: 92.2 FL — SIGNIFICANT CHANGE UP (ref 80–100)
PHOSPHATE SERPL-MCNC: 6.4 MG/DL — HIGH (ref 2.5–4.5)
PLATELET # BLD AUTO: 215 K/UL — SIGNIFICANT CHANGE UP (ref 150–400)
POTASSIUM SERPL-MCNC: 5.4 MMOL/L — HIGH (ref 3.5–5.3)
POTASSIUM SERPL-SCNC: 5.4 MMOL/L — HIGH (ref 3.5–5.3)
PROTHROM AB SERPL-ACNC: 27.1 SEC — HIGH (ref 9.8–12.7)
RBC # BLD: 2.79 M/UL — LOW (ref 4.2–5.8)
RBC # FLD: 13.8 % — SIGNIFICANT CHANGE UP (ref 10.3–14.5)
SODIUM SERPL-SCNC: 137 MMOL/L — SIGNIFICANT CHANGE UP (ref 135–145)
WBC # BLD: 13.6 K/UL — HIGH (ref 3.8–10.5)
WBC # FLD AUTO: 13.6 K/UL — HIGH (ref 3.8–10.5)

## 2017-09-30 PROCEDURE — 99233 SBSQ HOSP IP/OBS HIGH 50: CPT | Mod: GC

## 2017-09-30 PROCEDURE — 99233 SBSQ HOSP IP/OBS HIGH 50: CPT

## 2017-09-30 RX ORDER — WARFARIN SODIUM 2.5 MG/1
5 TABLET ORAL ONCE
Qty: 0 | Refills: 0 | Status: COMPLETED | OUTPATIENT
Start: 2017-09-30 | End: 2017-09-30

## 2017-09-30 RX ADMIN — HEPARIN SODIUM 6 UNIT(S)/HR: 5000 INJECTION INTRAVENOUS; SUBCUTANEOUS at 07:56

## 2017-09-30 RX ADMIN — AMIODARONE HYDROCHLORIDE 400 MILLIGRAM(S): 400 TABLET ORAL at 21:38

## 2017-09-30 RX ADMIN — WARFARIN SODIUM 5 MILLIGRAM(S): 2.5 TABLET ORAL at 21:38

## 2017-09-30 RX ADMIN — Medication 5 MILLIGRAM(S): at 05:21

## 2017-09-30 RX ADMIN — Medication 25 MILLIGRAM(S): at 18:06

## 2017-09-30 RX ADMIN — HEPARIN SODIUM 6 UNIT(S)/HR: 5000 INJECTION INTRAVENOUS; SUBCUTANEOUS at 00:02

## 2017-09-30 RX ADMIN — Medication 5 MILLIGRAM(S): at 18:05

## 2017-09-30 RX ADMIN — Medication 1: at 18:04

## 2017-09-30 RX ADMIN — ERYTHROPOIETIN 10000 UNIT(S): 10000 INJECTION, SOLUTION INTRAVENOUS; SUBCUTANEOUS at 11:11

## 2017-09-30 RX ADMIN — AMIODARONE HYDROCHLORIDE 400 MILLIGRAM(S): 400 TABLET ORAL at 14:18

## 2017-09-30 RX ADMIN — HEPARIN SODIUM 6 UNIT(S)/HR: 5000 INJECTION INTRAVENOUS; SUBCUTANEOUS at 12:39

## 2017-09-30 RX ADMIN — Medication 81 MILLIGRAM(S): at 14:18

## 2017-09-30 RX ADMIN — TAMSULOSIN HYDROCHLORIDE 0.4 MILLIGRAM(S): 0.4 CAPSULE ORAL at 21:38

## 2017-09-30 RX ADMIN — AMIODARONE HYDROCHLORIDE 400 MILLIGRAM(S): 400 TABLET ORAL at 00:44

## 2017-09-30 RX ADMIN — PANTOPRAZOLE SODIUM 40 MILLIGRAM(S): 20 TABLET, DELAYED RELEASE ORAL at 13:43

## 2017-09-30 RX ADMIN — CLOPIDOGREL BISULFATE 75 MILLIGRAM(S): 75 TABLET, FILM COATED ORAL at 14:19

## 2017-09-30 RX ADMIN — Medication 1: at 07:55

## 2017-09-30 RX ADMIN — ATORVASTATIN CALCIUM 40 MILLIGRAM(S): 80 TABLET, FILM COATED ORAL at 21:38

## 2017-09-30 NOTE — PROGRESS NOTE ADULT - PROBLEM SELECTOR PLAN 1
-multifocal infarcts concerning for embolic strokes  -c/w Heparin gtt, goal PTT 50-70, bridge to Coumadin  - dose 5mg coumadin  - Atorvastin 40mg  - S&S assessment - honey nectar cons.  - PT/OT/Neuro following  - removed NGT

## 2017-09-30 NOTE — PROGRESS NOTE ADULT - ATTENDING COMMENTS
Agree with R1/R2 plan of care  - Off IV heparin as INR 2.48 today, c/w coumadin (INR 2-3)  c/w Amiodarone load 10gm, then 200mg daily, BB, ASA, Plavix, statin  - Cardiology and EP plans noted. No plan for AICD after conversation with family  - HD with UF per Renal  - He is a DNR

## 2017-09-30 NOTE — PROGRESS NOTE ADULT - SUBJECTIVE AND OBJECTIVE BOX
NEPHROLOGY PROGRESS NOTE    CHIEF COMPLAINT:  ESRD    HPI:  Had dialysis earlier this AM.  Now he is lethargic and does not want to eat.    ROS:  unable    EXAM:  T(F): 98.6 (09-30-17 @ 13:35)  HR: 86 (09-30-17 @ 13:35)  BP: 115/73 (09-30-17 @ 13:35)  RR: 18 (09-30-17 @ 13:35)  SpO2: 100% (09-30-17 @ 13:35)    Conversant, in no apparent distress  Normal respiratory effort, lungs clear bilaterally  Heart RRR with no murmur, no peripheral edema         LABS                             8.5    13.6  )-----------( 215      ( 30 Sep 2017 07:11 )             25.7          09-30    137  |  91<L>  |  93<H>  ----------------------------<  194<H>  5.4<H>   |  21<L>  |  7.58<H>    Ca    9.0      30 Sep 2017 07:11  Phos  6.4     09-30  Mg     2.8     09-30      ASSESSMENT:  1.  ESRD on hemodialysis  2.  s/p stroke and cardiac arrest    PLAN:  Continue dialysis on TTS schedule  Do not plan permanent access until goals of care have been delineated

## 2017-09-30 NOTE — PROGRESS NOTE ADULT - PROBLEM SELECTOR PLAN 2
-c/w PO Amio, load to 10g (completes in 3 days)  -c/w heparin gtt, bridge to coumadin  - dose 5mg coumadin  -daily INR  -c/w Tele monitoring  -Cards following  - theurapeutic INR, likely require 24hrs

## 2017-09-30 NOTE — PROGRESS NOTE ADULT - ATTENDING COMMENTS
Patient seen and examined; agree with Cardiology Fellow assessment and plan.  --Amiodarone loading for atrial fibrillation and VT.   --Optimization of CHF regimen as Cr and BP can tolerate.  --Fluid removal via dialysis today; patient remains short-of-breath.   --EP input noted; no plan for device implantation at this time per EPS discussion with family.

## 2017-09-30 NOTE — PROGRESS NOTE ADULT - SUBJECTIVE AND OBJECTIVE BOX
Patient is a 72y old  Male who presents with a chief complaint of s/p cardiac cath (22 Sep 2017 12:34)        SUBJECTIVE / OVERNIGHT EVENTS: Increased work of breathing and diaphoretic o/n, ABG Wnl, , afebrile. Pending HD today. Pt is alert but non verbal, with Right side hemiplegia.        MEDICATIONS  (STANDING):  tamsulosin 0.4 milliGRAM(s) Oral at bedtime  clopidogrel Tablet 75 milliGRAM(s) Oral daily  busPIRone 5 milliGRAM(s) Oral two times a day  atorvastatin 40 milliGRAM(s) Oral at bedtime  aspirin  chewable 81 milliGRAM(s) Oral daily  metoprolol 25 milliGRAM(s) Enteral Tube two times a day  amiodarone    Tablet 400 milliGRAM(s) Oral every 8 hours  epoetin georgie Injectable 79725 Unit(s) IV Push <User Schedule>  pantoprazole  Injectable 40 milliGRAM(s) IV Push daily  heparin  Infusion 600 Unit(s)/Hr (6 mL/Hr) IV Continuous <Continuous>  insulin lispro (HumaLOG) corrective regimen sliding scale   SubCutaneous three times a day before meals  insulin lispro (HumaLOG) corrective regimen sliding scale   SubCutaneous at bedtime  dextrose 5%. 1000 milliLiter(s) (50 mL/Hr) IV Continuous <Continuous>  dextrose 50% Injectable 12.5 Gram(s) IV Push once  dextrose 50% Injectable 25 Gram(s) IV Push once  dextrose 50% Injectable 25 Gram(s) IV Push once    MEDICATIONS  (PRN):  dextrose Gel 1 Dose(s) Oral once PRN Blood Glucose LESS THAN 70 milliGRAM(s)/deciLiter  glucagon  Injectable 1 milliGRAM(s) IntraMuscular once PRN Glucose <70 milliGRAM(s)/deciLiter      T(C): 36.3 (09-30-17 @ 08:10), Max: 36.8 (09-29-17 @ 21:36)  HR: 85 (09-30-17 @ 08:10) (75 - 91)  BP: 130/79 (09-30-17 @ 08:10) (114/76 - 144/82)  RR: 19 (09-30-17 @ 08:10) (18 - 19)  SpO2: 100% (09-30-17 @ 08:10) (91% - 100%)  CAPILLARY BLOOD GLUCOSE  196 (30 Sep 2017 07:50)  202 (30 Sep 2017 02:23)  209 (29 Sep 2017 22:04)  201 (29 Sep 2017 18:07)  223 (29 Sep 2017 13:17)        I&O's Summary    29 Sep 2017 07:01  -  30 Sep 2017 07:00  --------------------------------------------------------  IN: 367 mL / OUT: 0 mL / NET: 367 mL        PHYSICAL EXAM  GENERAL: NAD, well-developed  HEAD:  Atraumatic, Normocephalic  EYES: EOMI, PERRLA, conjunctiva and sclera clear  NECK: Supple, No JVD  CHEST/LUNG: Clear to auscultation bilaterally; No wheeze  HEART: Regular rate and rhythm; No murmurs, rubs, or gallops  ABDOMEN: Soft, Nontender, Nondistended; Bowel sounds present  EXTREMITIES:  2+ Peripheral Pulses, No clubbing, cyanosis, or edema  PSYCH: AAOx3  SKIN: No rashes or lesions    LABS:                        8.5    13.6  )-----------( 215      ( 30 Sep 2017 07:11 )             25.7     09-30    137  |  91<L>  |  93<H>  ----------------------------<  194<H>  5.4<H>   |  21<L>  |  7.58<H>    Ca    9.0      30 Sep 2017 07:11  Phos  6.4     09-30  Mg     2.8     09-30      PT/INR - ( 30 Sep 2017 07:11 )   PT: 27.1 sec;   INR: 2.46 ratio         PTT - ( 30 Sep 2017 07:11 )  PTT:58.9 sec          RADIOLOGY & ADDITIONAL TESTS:    Imaging Personally Reviewed:  Consultant(s) Notes Reviewed:    Care Discussed with Consultants/Other Providers:

## 2017-09-30 NOTE — PROGRESS NOTE ADULT - ASSESSMENT
71 y/o M w/ a PMHx of extensive CAD, T2DM, ESRD new HD, prior CVA w/ multiple sites of infarcts who was transferred from OSH for NSTEMI s/p impella assisted LHC and pyelonephritis with course c/b obstructive nephropathy requiring HD transferred from floor to CCU after cardiac arrest, v-tach and subsequent ROSC w/ hospital course further c/b embolic CVA (9/23). Pt remains status quo today.  He has a guarded prognosis but otherwise stable. Family is hopeful that patient will regain some function which is possible.     # NSTEMI c/b Vtach arrest s/p Impella-assisted Cath with BABAR x5 to LAD/RCA, stable  - c/w DAPT, Lipitor 40mg QHS, and Lopressor 25mg PO BID.   - while not significantly volume overloaded on exam, he continues to complain of increased SOB; will benefit from HD today for fluid removal  - holding ACEI given hyperkalemia; once renal function stabilizes we can introduce a regimen to help with his EF in addition to the beta-blocker  - EP had discussion with family. No plan for ICD at this time for secondary prevention    # Atrial fibrillation and flutter, in SR   - c/w PO Amio, load to 10g (completes in 5 days), once load is completed, change to 200mg OD  - c/w coumadin (INR goal 2-3); may DC heparin given INR is now 2.46      [employer] MD  [id number]

## 2017-09-30 NOTE — PROGRESS NOTE ADULT - SUBJECTIVE AND OBJECTIVE BOX
HPI:  Patient alert but nonverbal and with R hemiplegia. Continues to have shortness of breath and increased work of breathing.    Review Of Systems:  [x] 10 point review of systems is otherwise negative except as mentioned above    Medications:  tamsulosin 0.4 milliGRAM(s) Oral at bedtime  clopidogrel Tablet 75 milliGRAM(s) Oral daily  busPIRone 5 milliGRAM(s) Oral two times a day  atorvastatin 40 milliGRAM(s) Oral at bedtime  aspirin  chewable 81 milliGRAM(s) Oral daily  metoprolol 25 milliGRAM(s) Enteral Tube two times a day  amiodarone    Tablet 400 milliGRAM(s) Oral every 8 hours  epoetin georgie Injectable 23058 Unit(s) IV Push <User Schedule>  pantoprazole  Injectable 40 milliGRAM(s) IV Push daily  insulin lispro (HumaLOG) corrective regimen sliding scale   SubCutaneous three times a day before meals  insulin lispro (HumaLOG) corrective regimen sliding scale   SubCutaneous at bedtime  dextrose 5%. 1000 milliLiter(s) IV Continuous <Continuous>  dextrose Gel 1 Dose(s) Oral once PRN  dextrose 50% Injectable 12.5 Gram(s) IV Push once  dextrose 50% Injectable 25 Gram(s) IV Push once  dextrose 50% Injectable 25 Gram(s) IV Push once  glucagon  Injectable 1 milliGRAM(s) IntraMuscular once PRN  warfarin 5 milliGRAM(s) Oral once    PMH/PSH/FH/SH: [ ] Unchanged  Vitals:  T(C): 36.9 (17 @ 09:15), Max: 36.9 (17 @ 09:15)  HR: 95 (17 @ 09:15) (75 - 95)  BP: 108/76 (17 @ 09:15) (108/76 - 144/82)  BP(mean): --  RR: 16 (17 @ 09:15) (16 - 19)  SpO2: 96% (17 @ 09:15) (91% - 100%)  Wt(kg): --  Daily     Daily Weight in k.8 (30 Sep 2017 09:15)  I&O's Summary    29 Sep 2017 07:01  -  30 Sep 2017 07:00  --------------------------------------------------------  IN: 367 mL / OUT: 0 mL / NET: 367 mL        Physical Exam:  HEENT:   Normal oral mucosa, PERRL, EOMI	  Lymphatic: No lymphadenopathy  Cardiovascular: Normal S1 S2, No JVD, No murmurs, No edema  Respiratory: Decreased breath sounds in bases	  Psychiatry: A & O x 3, Mood & affect appropriate  Gastrointestinal:  Soft, Non-tender, + BS	  Skin: ecchymosis on left proximal anterior thigh which extends to LLQ/Left flank  Neurologic: Nonverbal, R hemiplegia  Extremities: Normal range of motion, No clubbing, cyanosis or edema  Vascular: Peripheral pulses palpable 2+ bilaterally        137  |  91<L>  |  93<H>  ----------------------------<  194<H>  5.4<H>   |  21<L>  |  7.58<H>    Ca    9.0      30 Sep 2017 07:11  Phos  6.4       Mg     2.8     -30    PT/INR - ( 30 Sep 2017 07:11 )   PT: 27.1 sec;   INR: 2.46 ratio      PTT - ( 30 Sep 2017 07:11 )  PTT:58.9 sec    Interpretation of Telemetry: SR

## 2017-10-01 LAB
ANION GAP SERPL CALC-SCNC: 22 MMOL/L — HIGH (ref 5–17)
BASOPHILS # BLD AUTO: 0 K/UL — SIGNIFICANT CHANGE UP (ref 0–0.2)
BASOPHILS NFR BLD AUTO: 0.4 % — SIGNIFICANT CHANGE UP (ref 0–2)
BUN SERPL-MCNC: 81 MG/DL — HIGH (ref 7–23)
CALCIUM SERPL-MCNC: 9 MG/DL — SIGNIFICANT CHANGE UP (ref 8.4–10.5)
CHLORIDE SERPL-SCNC: 96 MMOL/L — SIGNIFICANT CHANGE UP (ref 96–108)
CO2 SERPL-SCNC: 22 MMOL/L — SIGNIFICANT CHANGE UP (ref 22–31)
CREAT SERPL-MCNC: 6.77 MG/DL — HIGH (ref 0.5–1.3)
EOSINOPHIL # BLD AUTO: 0.1 K/UL — SIGNIFICANT CHANGE UP (ref 0–0.5)
EOSINOPHIL NFR BLD AUTO: 0.6 % — SIGNIFICANT CHANGE UP (ref 0–6)
GLUCOSE SERPL-MCNC: 180 MG/DL — HIGH (ref 70–99)
HCT VFR BLD CALC: 26.5 % — LOW (ref 39–50)
HGB BLD-MCNC: 8.7 G/DL — LOW (ref 13–17)
INR BLD: 3.68 RATIO — HIGH (ref 0.88–1.16)
LYMPHOCYTES # BLD AUTO: 1.5 K/UL — SIGNIFICANT CHANGE UP (ref 1–3.3)
LYMPHOCYTES # BLD AUTO: 14.6 % — SIGNIFICANT CHANGE UP (ref 13–44)
MAGNESIUM SERPL-MCNC: 2.6 MG/DL — SIGNIFICANT CHANGE UP (ref 1.6–2.6)
MCHC RBC-ENTMCNC: 30.1 PG — SIGNIFICANT CHANGE UP (ref 27–34)
MCHC RBC-ENTMCNC: 32.7 GM/DL — SIGNIFICANT CHANGE UP (ref 32–36)
MCV RBC AUTO: 92 FL — SIGNIFICANT CHANGE UP (ref 80–100)
MONOCYTES # BLD AUTO: 0.9 K/UL — SIGNIFICANT CHANGE UP (ref 0–0.9)
MONOCYTES NFR BLD AUTO: 9.1 % — SIGNIFICANT CHANGE UP (ref 2–14)
NEUTROPHILS # BLD AUTO: 7.5 K/UL — HIGH (ref 1.8–7.4)
NEUTROPHILS NFR BLD AUTO: 75.2 % — SIGNIFICANT CHANGE UP (ref 43–77)
PHOSPHATE SERPL-MCNC: 6.7 MG/DL — HIGH (ref 2.5–4.5)
PLATELET # BLD AUTO: 215 K/UL — SIGNIFICANT CHANGE UP (ref 150–400)
POTASSIUM SERPL-MCNC: 5.2 MMOL/L — SIGNIFICANT CHANGE UP (ref 3.5–5.3)
POTASSIUM SERPL-SCNC: 5.2 MMOL/L — SIGNIFICANT CHANGE UP (ref 3.5–5.3)
PROTHROM AB SERPL-ACNC: 40.8 SEC — HIGH (ref 9.8–12.7)
RBC # BLD: 2.88 M/UL — LOW (ref 4.2–5.8)
RBC # FLD: 13.7 % — SIGNIFICANT CHANGE UP (ref 10.3–14.5)
SODIUM SERPL-SCNC: 140 MMOL/L — SIGNIFICANT CHANGE UP (ref 135–145)
WBC # BLD: 10 K/UL — SIGNIFICANT CHANGE UP (ref 3.8–10.5)
WBC # FLD AUTO: 10 K/UL — SIGNIFICANT CHANGE UP (ref 3.8–10.5)

## 2017-10-01 PROCEDURE — 99232 SBSQ HOSP IP/OBS MODERATE 35: CPT | Mod: GC

## 2017-10-01 RX ADMIN — Medication 5 MILLIGRAM(S): at 05:31

## 2017-10-01 RX ADMIN — Medication 81 MILLIGRAM(S): at 11:34

## 2017-10-01 RX ADMIN — AMIODARONE HYDROCHLORIDE 400 MILLIGRAM(S): 400 TABLET ORAL at 21:23

## 2017-10-01 RX ADMIN — AMIODARONE HYDROCHLORIDE 400 MILLIGRAM(S): 400 TABLET ORAL at 13:31

## 2017-10-01 RX ADMIN — PANTOPRAZOLE SODIUM 40 MILLIGRAM(S): 20 TABLET, DELAYED RELEASE ORAL at 11:35

## 2017-10-01 RX ADMIN — Medication 25 MILLIGRAM(S): at 05:31

## 2017-10-01 RX ADMIN — TAMSULOSIN HYDROCHLORIDE 0.4 MILLIGRAM(S): 0.4 CAPSULE ORAL at 21:23

## 2017-10-01 RX ADMIN — CLOPIDOGREL BISULFATE 75 MILLIGRAM(S): 75 TABLET, FILM COATED ORAL at 11:34

## 2017-10-01 RX ADMIN — ATORVASTATIN CALCIUM 40 MILLIGRAM(S): 80 TABLET, FILM COATED ORAL at 21:23

## 2017-10-01 RX ADMIN — AMIODARONE HYDROCHLORIDE 400 MILLIGRAM(S): 400 TABLET ORAL at 05:31

## 2017-10-01 RX ADMIN — Medication 1: at 09:19

## 2017-10-01 RX ADMIN — Medication 1: at 13:30

## 2017-10-01 RX ADMIN — Medication 25 MILLIGRAM(S): at 17:08

## 2017-10-01 RX ADMIN — Medication 5 MILLIGRAM(S): at 17:07

## 2017-10-01 NOTE — PROGRESS NOTE ADULT - ATTENDING COMMENTS
Agree with R1/R2 resident plan of care  Afebrile, no acute chest pain, SOB.  - INR 3.68 today, held coumadin (INR 2-3)  - c/w Amiodarone load 10gm, then 200mg daily, BB, ASA, Plavix, statin  - Cardiology and EP plans noted. No plan for AICD after conversation with family  - HD with +/- UF per Renal  - He is DNR . d/c planning with Palliative care/Hospice

## 2017-10-01 NOTE — PROGRESS NOTE ADULT - PROBLEM SELECTOR PLAN 4
-s/p Impella-assisted Cath with BABAR x5 to LAD/RCA  -c/w DAPT, Statin, and BetaBlocker  -holding ACEI given LIN/CKD, discuss with Renal  - cards following -s/p Impella-assisted Cath with BABAR x5 to LAD/RCA  -c/w DAPT, Statin, and BetaBlocker  -holding ACEI given LIN/CKD, discuss with Renal if can restart, atleast on non HD days  - cards following

## 2017-10-01 NOTE — PROGRESS NOTE ADULT - PROBLEM SELECTOR PLAN 10
-DVT PPX: heparin gtt -DVT PPX: heparin gtt  -pall care consult placed, pending family meeting on monday. Daughter will check with job if monday is possible.

## 2017-10-01 NOTE — PROGRESS NOTE ADULT - PROBLEM SELECTOR PLAN 1
-multifocal infarcts concerning for embolic strokes  -c/w Heparin gtt, goal PTT 50-70, bridge to Coumadin  - dose 5mg coumadin  - Atorvastin 40mg  - S&S assessment - honey nectar cons.  - PT/OT/Neuro following  - removed NGT -multifocal infarcts concerning for embolic strokes  - d/cd heparin  - dose 5mg coumadin  - Atorvastin 40mg  - S&S assessment - honey nectar cons.  - PT/OT/Neuro following

## 2017-10-01 NOTE — PROGRESS NOTE ADULT - SUBJECTIVE AND OBJECTIVE BOX
Patient is a 72y old  Male who presents with a chief complaint of s/p cardiac cath (22 Sep 2017 12:34)        SUBJECTIVE / OVERNIGHT EVENTS: Pt nonverbal, tolerating feeds and oral meds. Strength present on L extremities. Daughter reports new bump in posterior right neck.       MEDICATIONS  (STANDING):  tamsulosin 0.4 milliGRAM(s) Oral at bedtime  clopidogrel Tablet 75 milliGRAM(s) Oral daily  busPIRone 5 milliGRAM(s) Oral two times a day  atorvastatin 40 milliGRAM(s) Oral at bedtime  aspirin  chewable 81 milliGRAM(s) Oral daily  metoprolol 25 milliGRAM(s) Enteral Tube two times a day  amiodarone    Tablet 400 milliGRAM(s) Oral every 8 hours  epoetin georgie Injectable 84802 Unit(s) IV Push <User Schedule>  pantoprazole  Injectable 40 milliGRAM(s) IV Push daily  insulin lispro (HumaLOG) corrective regimen sliding scale   SubCutaneous three times a day before meals  insulin lispro (HumaLOG) corrective regimen sliding scale   SubCutaneous at bedtime  dextrose 5%. 1000 milliLiter(s) (50 mL/Hr) IV Continuous <Continuous>  dextrose 50% Injectable 12.5 Gram(s) IV Push once  dextrose 50% Injectable 25 Gram(s) IV Push once  dextrose 50% Injectable 25 Gram(s) IV Push once    MEDICATIONS  (PRN):  dextrose Gel 1 Dose(s) Oral once PRN Blood Glucose LESS THAN 70 milliGRAM(s)/deciLiter  glucagon  Injectable 1 milliGRAM(s) IntraMuscular once PRN Glucose <70 milliGRAM(s)/deciLiter      T(C): 36.6 (10-01-17 @ 04:46), Max: 37 (09-30-17 @ 13:35)  HR: 82 (10-01-17 @ 04:46) (82 - 99)  BP: 113/75 (10-01-17 @ 04:46) (96/61 - 115/73)  RR: 18 (10-01-17 @ 04:46) (16 - 18)  SpO2: 98% (10-01-17 @ 04:46) (96% - 100%)  CAPILLARY BLOOD GLUCOSE  168 (01 Oct 2017 08:45)  179 (30 Sep 2017 21:05)  164 (30 Sep 2017 17:23)  142 (30 Sep 2017 13:35)        I&O's Summary    30 Sep 2017 07:01  -  01 Oct 2017 07:00  --------------------------------------------------------  IN: 320 mL / OUT: 1600 mL / NET: -1280 mL        PHYSICAL EXAM  GENERAL: NAD, well-developed  HEAD:  Atraumatic, Normocephalic  EYES: EOMI, PERRLA, conjunctiva and sclera clear  NECK: Supple, No JVD  CHEST/LUNG: Clear to auscultation bilaterally; No wheeze  HEART: Regular rate and rhythm; No murmurs, rubs, or gallops  ABDOMEN: Soft, Nontender, Nondistended; Bowel sounds present  EXTREMITIES:  2+ Peripheral Pulses, No clubbing, cyanosis, or edema  PSYCH: AAOx3  SKIN: No rashes or lesions    LABS:                        8.7    10.0  )-----------( 215      ( 01 Oct 2017 07:58 )             26.5     10-01    140  |  96  |  81<H>  ----------------------------<  180<H>  5.2   |  22  |  6.77<H>    Ca    9.0      01 Oct 2017 07:58  Phos  6.7     10-01  Mg     2.6     10-01      PT/INR - ( 30 Sep 2017 07:11 )   PT: 27.1 sec;   INR: 2.46 ratio         PTT - ( 30 Sep 2017 07:11 )  PTT:58.9 sec          RADIOLOGY & ADDITIONAL TESTS:    Imaging Personally Reviewed:  Consultant(s) Notes Reviewed:    Care Discussed with Consultants/Other Providers: Patient is a 72y old  Male who presents with a chief complaint of s/p cardiac cath (22 Sep 2017 12:34)        SUBJECTIVE / OVERNIGHT EVENTS: Pt nonverbal, tolerating feeds and oral meds. Strength present on L extremities. Daughter reports new bump in posterior right neck.       MEDICATIONS  (STANDING):  tamsulosin 0.4 milliGRAM(s) Oral at bedtime  clopidogrel Tablet 75 milliGRAM(s) Oral daily  busPIRone 5 milliGRAM(s) Oral two times a day  atorvastatin 40 milliGRAM(s) Oral at bedtime  aspirin  chewable 81 milliGRAM(s) Oral daily  metoprolol 25 milliGRAM(s) Enteral Tube two times a day  amiodarone    Tablet 400 milliGRAM(s) Oral every 8 hours  epoetin georgie Injectable 88932 Unit(s) IV Push <User Schedule>  pantoprazole  Injectable 40 milliGRAM(s) IV Push daily  insulin lispro (HumaLOG) corrective regimen sliding scale   SubCutaneous three times a day before meals  insulin lispro (HumaLOG) corrective regimen sliding scale   SubCutaneous at bedtime  dextrose 5%. 1000 milliLiter(s) (50 mL/Hr) IV Continuous <Continuous>  dextrose 50% Injectable 12.5 Gram(s) IV Push once  dextrose 50% Injectable 25 Gram(s) IV Push once  dextrose 50% Injectable 25 Gram(s) IV Push once    MEDICATIONS  (PRN):  dextrose Gel 1 Dose(s) Oral once PRN Blood Glucose LESS THAN 70 milliGRAM(s)/deciLiter  glucagon  Injectable 1 milliGRAM(s) IntraMuscular once PRN Glucose <70 milliGRAM(s)/deciLiter      T(C): 36.6 (10-01-17 @ 04:46), Max: 37 (09-30-17 @ 13:35)  HR: 82 (10-01-17 @ 04:46) (82 - 99)  BP: 113/75 (10-01-17 @ 04:46) (96/61 - 115/73)  RR: 18 (10-01-17 @ 04:46) (16 - 18)  SpO2: 98% (10-01-17 @ 04:46) (96% - 100%)  CAPILLARY BLOOD GLUCOSE  168 (01 Oct 2017 08:45)  179 (30 Sep 2017 21:05)  164 (30 Sep 2017 17:23)  142 (30 Sep 2017 13:35)        I&O's Summary    30 Sep 2017 07:01  -  01 Oct 2017 07:00  --------------------------------------------------------  IN: 320 mL / OUT: 1600 mL / NET: -1280 mL        PHYSICAL EXAM  GENERAL: NAD, well-developed  HEAD:  Atraumatic, Normocephalic  EYES: EOMI, PERRLA, conjunctiva and sclera clear  NECK: Supple, No JVD,  CHEST/LUNG: Clear to auscultation bilaterally; No wheeze  HEART: Regular rate and rhythm; No murmurs, rubs, or gallops  ABDOMEN: Soft, Nontender, Nondistended; Bowel sounds present  EXTREMITIES:  2+ Peripheral Pulses, No clubbing, cyanosis, or edema  PSYCH: unable to assess  SKIN: No rashes or lesions    LABS:                        8.7    10.0  )-----------( 215      ( 01 Oct 2017 07:58 )             26.5     10-01    140  |  96  |  81<H>  ----------------------------<  180<H>  5.2   |  22  |  6.77<H>    Ca    9.0      01 Oct 2017 07:58  Phos  6.7     10-01  Mg     2.6     10-01      PT/INR - ( 30 Sep 2017 07:11 )   PT: 27.1 sec;   INR: 2.46 ratio         PTT - ( 30 Sep 2017 07:11 )  PTT:58.9 sec          RADIOLOGY & ADDITIONAL TESTS:    Imaging Personally Reviewed:  Consultant(s) Notes Reviewed:    Care Discussed with Consultants/Other Providers: Patient is a 72y old  Male who presents with a chief complaint of s/p cardiac cath (22 Sep 2017 12:34)        SUBJECTIVE / OVERNIGHT EVENTS: Pt nonverbal, tolerating feeds and oral meds. Strength present on L extremities. Daughter reports new bump in posterior right neck.       MEDICATIONS  (STANDING):  tamsulosin 0.4 milliGRAM(s) Oral at bedtime  clopidogrel Tablet 75 milliGRAM(s) Oral daily  busPIRone 5 milliGRAM(s) Oral two times a day  atorvastatin 40 milliGRAM(s) Oral at bedtime  aspirin  chewable 81 milliGRAM(s) Oral daily  metoprolol 25 milliGRAM(s) Enteral Tube two times a day  amiodarone    Tablet 400 milliGRAM(s) Oral every 8 hours  epoetin georgie Injectable 01868 Unit(s) IV Push <User Schedule>  pantoprazole  Injectable 40 milliGRAM(s) IV Push daily  insulin lispro (HumaLOG) corrective regimen sliding scale   SubCutaneous three times a day before meals  insulin lispro (HumaLOG) corrective regimen sliding scale   SubCutaneous at bedtime  dextrose 5%. 1000 milliLiter(s) (50 mL/Hr) IV Continuous <Continuous>  dextrose 50% Injectable 12.5 Gram(s) IV Push once  dextrose 50% Injectable 25 Gram(s) IV Push once  dextrose 50% Injectable 25 Gram(s) IV Push once    MEDICATIONS  (PRN):  dextrose Gel 1 Dose(s) Oral once PRN Blood Glucose LESS THAN 70 milliGRAM(s)/deciLiter  glucagon  Injectable 1 milliGRAM(s) IntraMuscular once PRN Glucose <70 milliGRAM(s)/deciLiter      T(C): 36.6 (10-01-17 @ 04:46), Max: 37 (09-30-17 @ 13:35)  HR: 82 (10-01-17 @ 04:46) (82 - 99)  BP: 113/75 (10-01-17 @ 04:46) (96/61 - 115/73)  RR: 18 (10-01-17 @ 04:46) (16 - 18)  SpO2: 98% (10-01-17 @ 04:46) (96% - 100%)  CAPILLARY BLOOD GLUCOSE  168 (01 Oct 2017 08:45)  179 (30 Sep 2017 21:05)  164 (30 Sep 2017 17:23)  142 (30 Sep 2017 13:35)        I&O's Summary    30 Sep 2017 07:01  -  01 Oct 2017 07:00  --------------------------------------------------------  IN: 320 mL / OUT: 1600 mL / NET: -1280 mL        PHYSICAL EXAM  GENERAL: NAD, well-developed  HEAD:  Atraumatic, Normocephalic  EYES: EOMI, PERRLA, conjunctiva and sclera clear  NECK: Supple, No JVD,[id number] will defined round mass,  CHEST/LUNG: Clear to auscultation bilaterally; No wheeze  HEART: Regular rate and rhythm; No murmurs, rubs, or gallops  ABDOMEN: Soft, Nontender, Nondistended; Bowel sounds present  EXTREMITIES:  2+ Peripheral Pulses, No clubbing, cyanosis, or edema  PSYCH: unable to assess  SKIN: No rashes or lesions    LABS:                        8.7    10.0  )-----------( 215      ( 01 Oct 2017 07:58 )             26.5     10-01    140  |  96  |  81<H>  ----------------------------<  180<H>  5.2   |  22  |  6.77<H>    Ca    9.0      01 Oct 2017 07:58  Phos  6.7     10-01  Mg     2.6     10-01      PT/INR - ( 30 Sep 2017 07:11 )   PT: 27.1 sec;   INR: 2.46 ratio         PTT - ( 30 Sep 2017 07:11 )  PTT:58.9 sec          RADIOLOGY & ADDITIONAL TESTS:    Imaging Personally Reviewed:  Consultant(s) Notes Reviewed:    Care Discussed with Consultants/Other Providers:

## 2017-10-01 NOTE — PROGRESS NOTE ADULT - ATTENDING COMMENTS
Patient seen and examined; agree with Cardiology Fellow assessment and plan.  --No significant acute changes.  --Hold coumadin for today in setting of supratherapeutic INR.  --Amiodarone load in progress.  --Dialysis for fluid removal per Nephrology.  --Modification of CHF regimen as BP and Cr can tolerate.  --EPS follow-up.

## 2017-10-01 NOTE — PROGRESS NOTE ADULT - PROBLEM SELECTOR PLAN 2
-c/w PO Amio, load to 10g (completes in 3 days)  -c/w heparin gtt, bridge to coumadin  - dose 5mg coumadin  -daily INR  -c/w Tele monitoring  -Cards following  - [facility] INR, likely require 24hrs -c/w PO Amio, load to 10g (completes in 3 days)  - dose 5mg coumadin  -daily INR  -c/w Tele monitoring  -Cards following  - [facility] INR -c/w PO Amio, load to 10g (completes in 3 days)  - dose 5mg coumadin  -daily INR check   -c/w Tele monitoring  -Cards following  - [facility] INR

## 2017-10-01 NOTE — PROGRESS NOTE ADULT - PROBLEM SELECTOR PLAN 5
- s/p HD  -will need to transition to Permacath for HD access  -avoid nephrotoxins  -f/u Renal recs - s/p HD  -will need to transition to Permacath for HD access, pending GOC/pall care with family  -avoid nephrotoxins  -f/u Renal recs  - will d/w renal to restart ace i

## 2017-10-01 NOTE — PROGRESS NOTE ADULT - SUBJECTIVE AND OBJECTIVE BOX
24H hour events:   Shortness of breath has improved s/p hemodialysis yesterday. Following dialysis, nephrology notes noted lethargy. This morning he is no longer lethargic. Shortness of breath has resolved    MEDICATIONS:  tamsulosin 0.4 milliGRAM(s) Oral at bedtime  clopidogrel Tablet 75 milliGRAM(s) Oral daily  aspirin  chewable 81 milliGRAM(s) Oral daily  metoprolol 25 milliGRAM(s) Enteral Tube two times a day  amiodarone    Tablet 400 milliGRAM(s) Oral every 8 hours        busPIRone 5 milliGRAM(s) Oral two times a day    pantoprazole  Injectable 40 milliGRAM(s) IV Push daily    atorvastatin 40 milliGRAM(s) Oral at bedtime  insulin lispro (HumaLOG) corrective regimen sliding scale   SubCutaneous three times a day before meals  insulin lispro (HumaLOG) corrective regimen sliding scale   SubCutaneous at bedtime  dextrose Gel 1 Dose(s) Oral once PRN  dextrose 50% Injectable 12.5 Gram(s) IV Push once  dextrose 50% Injectable 25 Gram(s) IV Push once  dextrose 50% Injectable 25 Gram(s) IV Push once  glucagon  Injectable 1 milliGRAM(s) IntraMuscular once PRN    epoetin georgie Injectable 25416 Unit(s) IV Push <User Schedule>  dextrose 5%. 1000 milliLiter(s) IV Continuous <Continuous>      REVIEW OF SYSTEMS:  Complete 10point ROS negative.    PHYSICAL EXAM:  T(C): 36.6 (10-01-17 @ 04:46), Max: 37 (09-30-17 @ 13:35)  HR: 82 (10-01-17 @ 04:46) (82 - 99)  BP: 113/75 (10-01-17 @ 04:46) (96/61 - 130/79)  RR: 18 (10-01-17 @ 04:46) (16 - 19)  SpO2: 98% (10-01-17 @ 04:46) (96% - 100%)  Wt(kg): --  I&O's Summary    30 Sep 2017 07:01  -  01 Oct 2017 07:00  --------------------------------------------------------  IN: 320 mL / OUT: 1600 mL / NET: -1280 mL        Appearance: Normal	  HEENT:   Normal oral mucosa, PERRL, EOMI	  Lymphatic: No lymphadenopathy  Cardiovascular: Normal S1 S2, No JVD, No murmurs, No edema  Respiratory: Lungs clear to auscultation	  Psychiatry: A & O x 3, Mood & affect appropriate  Gastrointestinal:  Soft, Non-tender, + BS	  Skin: ecchymosis on left proximal anterior thigh which extends to LLQ/Left flank	  Neurologic: Nonverbal, R hemiplegia  Extremities: Normal range of motion, No clubbing, cyanosis or edema  Vascular: Peripheral pulses palpable 2+ bilaterally        LABS:	 	    CBC Full  -  ( 30 Sep 2017 07:11 )  WBC Count : 13.6 K/uL  Hemoglobin : 8.5 g/dL  Hematocrit : 25.7 %  Platelet Count - Automated : 215 K/uL  Mean Cell Volume : 92.2 fl  Mean Cell Hemoglobin : 30.7 pg  Mean Cell Hemoglobin Concentration : 33.3 gm/dL  Auto Neutrophil # : x  Auto Lymphocyte # : x  Auto Monocyte # : x  Auto Eosinophil # : x  Auto Basophil # : x  Auto Neutrophil % : x  Auto Lymphocyte % : x  Auto Monocyte % : x  Auto Eosinophil % : x  Auto Basophil % : x    09-30    137  |  91<L>  |  93<H>  ----------------------------<  194<H>  5.4<H>   |  21<L>  |  7.58<H>    Ca    9.0      30 Sep 2017 07:11  Phos  6.4     09-30  Mg     2.8     09-30    LABS PENDING    proBNP:   Lipid Profile:   HgA1c:   TSH:       CARDIAC MARKERS:          TELEMETRY: No events on telemetry  	    PREVIOUS DIAGNOSTIC TESTING:    [ ] Echocardiogram:  Conclusions:  1. Tethered mitral valve leaflets with normal opening. Mild  mitral regurgitation.  2. Endocardial visualization enhanced with intravenous  injection of echo contrast (Definity). Severe left  ventricular systolic dysfunction. Regional wall motion  variation is noted. No left ventricular thrombus. EF about  20%  3. The right ventricle is not well visualized; grossly  normal right ventricular systolic function.  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 Pt remains status quo today.  He has a guarded prognosis but otherwise stable. Family is hopeful that patient will regain some function which is possible.     1. NSTEMI c/b VT arrest s/p impella-assisted PCI with BABAR x 5 to LAD/RCA. Now stable  -cont. DAPT, lipitor 40mg QD, metoprolol 25mg PO BID  -ACEI has been held in the setting of ESRD with hyperkalemia. Will continue to monitor potassium. ACEI may be started at some point with renal input    2. ischemic cardiomyopathy  - NYHA 2-3, ACC/AHA C-currently euvolemic  -holding aceI, Not yet starting aldactone in the setting of hyperkalemia. If aceI cannot be started as inpatient, the combination of hydralazine and isordil may be of benefit  -metoprolol at 25mg bid. HR not yet at goal. BP  systolic. metoprolol should be uptitrated as much as possible prior to discharge. Increase to 37.5mg BID today and monitor BP and HR. He should be transitioned to once daily metoprolol succinate prior to discharge  -EP has been consulted and there is no plan for ICD at this time for secondary prevention    3. atrial fibrillation/flutter   -currently in NSR  -cont PO amio load to 10G (completes in 4 days). Once load is complete, change to 200mg OD  -cont. warfarin with INR goal 2-3. INR yesterday was 2.46. re-check today    Destin Stinson MD  [id number] 24H hour events:   Shortness of breath has improved s/p hemodialysis yesterday. Following dialysis, nephrology notes noted lethargy. This morning he is no longer lethargic. Shortness of breath has resolved    MEDICATIONS:  tamsulosin 0.4 milliGRAM(s) Oral at bedtime  clopidogrel Tablet 75 milliGRAM(s) Oral daily  aspirin  chewable 81 milliGRAM(s) Oral daily  metoprolol 25 milliGRAM(s) Enteral Tube two times a day  amiodarone    Tablet 400 milliGRAM(s) Oral every 8 hours        busPIRone 5 milliGRAM(s) Oral two times a day    pantoprazole  Injectable 40 milliGRAM(s) IV Push daily    atorvastatin 40 milliGRAM(s) Oral at bedtime  insulin lispro (HumaLOG) corrective regimen sliding scale   SubCutaneous three times a day before meals  insulin lispro (HumaLOG) corrective regimen sliding scale   SubCutaneous at bedtime  dextrose Gel 1 Dose(s) Oral once PRN  dextrose 50% Injectable 12.5 Gram(s) IV Push once  dextrose 50% Injectable 25 Gram(s) IV Push once  dextrose 50% Injectable 25 Gram(s) IV Push once  glucagon  Injectable 1 milliGRAM(s) IntraMuscular once PRN    epoetin georgie Injectable 10808 Unit(s) IV Push <User Schedule>  dextrose 5%. 1000 milliLiter(s) IV Continuous <Continuous>      REVIEW OF SYSTEMS:  Complete 10point ROS negative.    PHYSICAL EXAM:  T(C): 36.6 (10-01-17 @ 04:46), Max: 37 (09-30-17 @ 13:35)  HR: 82 (10-01-17 @ 04:46) (82 - 99)  BP: 113/75 (10-01-17 @ 04:46) (96/61 - 130/79)  RR: 18 (10-01-17 @ 04:46) (16 - 19)  SpO2: 98% (10-01-17 @ 04:46) (96% - 100%)  Wt(kg): --  I&O's Summary    30 Sep 2017 07:01  -  01 Oct 2017 07:00  --------------------------------------------------------  IN: 320 mL / OUT: 1600 mL / NET: -1280 mL        Appearance: Normal	  HEENT:   Normal oral mucosa, PERRL, EOMI	  Lymphatic: No lymphadenopathy  Cardiovascular: Normal S1 S2, No JVD, No murmurs, No edema  Respiratory: Lungs clear to auscultation	  Psychiatry: A & O x 3, Mood & affect appropriate  Gastrointestinal:  Soft, Non-tender, + BS	  Skin: ecchymosis on left proximal anterior thigh which extends to LLQ/Left flank	  Neurologic: Nonverbal, R hemiplegia  Extremities: Normal range of motion, No clubbing, cyanosis or edema  Vascular: Peripheral pulses palpable 2+ bilaterally        LABS:	 	    CBC Full  -  ( 30 Sep 2017 07:11 )  WBC Count : 13.6 K/uL  Hemoglobin : 8.5 g/dL  Hematocrit : 25.7 %  Platelet Count - Automated : 215 K/uL  Mean Cell Volume : 92.2 fl  Mean Cell Hemoglobin : 30.7 pg  Mean Cell Hemoglobin Concentration : 33.3 gm/dL  Auto Neutrophil # : x  Auto Lymphocyte # : x  Auto Monocyte # : x  Auto Eosinophil # : x  Auto Basophil # : x  Auto Neutrophil % : x  Auto Lymphocyte % : x  Auto Monocyte % : x  Auto Eosinophil % : x  Auto Basophil % : x    09-30    137  |  91<L>  |  93<H>  ----------------------------<  194<H>  5.4<H>   |  21<L>  |  7.58<H>    Ca    9.0      30 Sep 2017 07:11  Phos  6.4     09-30  Mg     2.8     09-30    LABS PENDING    proBNP:   Lipid Profile:   HgA1c:   TSH:       CARDIAC MARKERS:          TELEMETRY: No events on telemetry  	    PREVIOUS DIAGNOSTIC TESTING:    [ ] Echocardiogram:  Conclusions:  1. Tethered mitral valve leaflets with normal opening. Mild  mitral regurgitation.  2. Endocardial visualization enhanced with intravenous  injection of echo contrast (Definity). Severe left  ventricular systolic dysfunction. Regional wall motion  variation is noted. No left ventricular thrombus. EF about  20%  3. The right ventricle is not well visualized; grossly  normal right ventricular systolic function.  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 Pt remains status quo today.  He has a guarded prognosis but otherwise stable. Family is hopeful that patient will regain some function which is possible.     1. NSTEMI c/b VT arrest s/p impella-assisted PCI with BABAR x 5 to LAD/RCA. Now stable  -cont. DAPT, lipitor 40mg QD, metoprolol 25mg PO BID  -ACEI has been held in the setting of ESRD with hyperkalemia. Will continue to monitor potassium. ACEI may be started at some point with renal input    2. ischemic cardiomyopathy  - NYHA 2-3, ACC/AHA C-currently euvolemic  -holding aceI, Not yet starting aldactone in the setting of hyperkalemia. If aceI cannot be started as inpatient, the combination of hydralazine and isordil may be of benefit  -metoprolol at 25mg bid. HR not yet at goal. BP  systolic. metoprolol should be uptitrated as much as possible prior to discharge. Increase to 37.5mg BID today and monitor BP and HR. He should be transitioned to once daily metoprolol succinate prior to discharge  -EP has been consulted and there is no plan for ICD at this time for secondary prevention    3. atrial fibrillation/flutter   -currently in NSR  -cont PO amio load to 10G (completes in 4 days). Once load is complete, change to 200mg OD  -hold warfarin in the setting of supratheraputic INR. continue to follow.    Destin Stinson MD  [id number]

## 2017-10-02 DIAGNOSIS — Z51.5 ENCOUNTER FOR PALLIATIVE CARE: ICD-10-CM

## 2017-10-02 DIAGNOSIS — R54 AGE-RELATED PHYSICAL DEBILITY: ICD-10-CM

## 2017-10-02 LAB
ANION GAP SERPL CALC-SCNC: 24 MMOL/L — HIGH (ref 5–17)
ANION GAP SERPL CALC-SCNC: 27 MMOL/L — HIGH (ref 5–17)
BASOPHILS # BLD AUTO: 0 K/UL — SIGNIFICANT CHANGE UP (ref 0–0.2)
BASOPHILS NFR BLD AUTO: 0.3 % — SIGNIFICANT CHANGE UP (ref 0–2)
BUN SERPL-MCNC: 115 MG/DL — HIGH (ref 7–23)
BUN SERPL-MCNC: 135 MG/DL — HIGH (ref 7–23)
CALCIUM SERPL-MCNC: 8.6 MG/DL — SIGNIFICANT CHANGE UP (ref 8.4–10.5)
CALCIUM SERPL-MCNC: 9 MG/DL — SIGNIFICANT CHANGE UP (ref 8.4–10.5)
CHLORIDE SERPL-SCNC: 91 MMOL/L — LOW (ref 96–108)
CHLORIDE SERPL-SCNC: 93 MMOL/L — LOW (ref 96–108)
CO2 SERPL-SCNC: 19 MMOL/L — LOW (ref 22–31)
CO2 SERPL-SCNC: 22 MMOL/L — SIGNIFICANT CHANGE UP (ref 22–31)
CREAT SERPL-MCNC: 10.1 MG/DL — HIGH (ref 0.5–1.3)
CREAT SERPL-MCNC: 9.02 MG/DL — HIGH (ref 0.5–1.3)
EOSINOPHIL # BLD AUTO: 0.2 K/UL — SIGNIFICANT CHANGE UP (ref 0–0.5)
EOSINOPHIL NFR BLD AUTO: 1.8 % — SIGNIFICANT CHANGE UP (ref 0–6)
GLUCOSE SERPL-MCNC: 149 MG/DL — HIGH (ref 70–99)
GLUCOSE SERPL-MCNC: 208 MG/DL — HIGH (ref 70–99)
HCT VFR BLD CALC: 26.7 % — LOW (ref 39–50)
HGB BLD-MCNC: 8.9 G/DL — LOW (ref 13–17)
INR BLD: 4.28 RATIO — HIGH (ref 0.88–1.16)
LYMPHOCYTES # BLD AUTO: 1.2 K/UL — SIGNIFICANT CHANGE UP (ref 1–3.3)
LYMPHOCYTES # BLD AUTO: 12.9 % — LOW (ref 13–44)
MAGNESIUM SERPL-MCNC: 2.8 MG/DL — HIGH (ref 1.6–2.6)
MCHC RBC-ENTMCNC: 30.8 PG — SIGNIFICANT CHANGE UP (ref 27–34)
MCHC RBC-ENTMCNC: 33.5 GM/DL — SIGNIFICANT CHANGE UP (ref 32–36)
MCV RBC AUTO: 91.9 FL — SIGNIFICANT CHANGE UP (ref 80–100)
MONOCYTES # BLD AUTO: 0.6 K/UL — SIGNIFICANT CHANGE UP (ref 0–0.9)
MONOCYTES NFR BLD AUTO: 6.9 % — SIGNIFICANT CHANGE UP (ref 2–14)
NEUTROPHILS # BLD AUTO: 6.9 K/UL — SIGNIFICANT CHANGE UP (ref 1.8–7.4)
NEUTROPHILS NFR BLD AUTO: 78.1 % — HIGH (ref 43–77)
PHOSPHATE SERPL-MCNC: 9.3 MG/DL — HIGH (ref 2.5–4.5)
PLATELET # BLD AUTO: 248 K/UL — SIGNIFICANT CHANGE UP (ref 150–400)
POTASSIUM SERPL-MCNC: 5.5 MMOL/L — HIGH (ref 3.5–5.3)
POTASSIUM SERPL-MCNC: 6.2 MMOL/L — CRITICAL HIGH (ref 3.5–5.3)
POTASSIUM SERPL-SCNC: 5.5 MMOL/L — HIGH (ref 3.5–5.3)
POTASSIUM SERPL-SCNC: 6.2 MMOL/L — CRITICAL HIGH (ref 3.5–5.3)
PROTHROM AB SERPL-ACNC: 47.6 SEC — HIGH (ref 9.8–12.7)
RBC # BLD: 2.9 M/UL — LOW (ref 4.2–5.8)
RBC # FLD: 13.7 % — SIGNIFICANT CHANGE UP (ref 10.3–14.5)
SODIUM SERPL-SCNC: 137 MMOL/L — SIGNIFICANT CHANGE UP (ref 135–145)
SODIUM SERPL-SCNC: 139 MMOL/L — SIGNIFICANT CHANGE UP (ref 135–145)
WBC # BLD: 8.9 K/UL — SIGNIFICANT CHANGE UP (ref 3.8–10.5)
WBC # FLD AUTO: 8.9 K/UL — SIGNIFICANT CHANGE UP (ref 3.8–10.5)

## 2017-10-02 PROCEDURE — 93010 ELECTROCARDIOGRAM REPORT: CPT

## 2017-10-02 PROCEDURE — 99232 SBSQ HOSP IP/OBS MODERATE 35: CPT

## 2017-10-02 PROCEDURE — 99232 SBSQ HOSP IP/OBS MODERATE 35: CPT | Mod: GC

## 2017-10-02 PROCEDURE — 99223 1ST HOSP IP/OBS HIGH 75: CPT | Mod: GC

## 2017-10-02 RX ORDER — SEVELAMER CARBONATE 2400 MG/1
800 POWDER, FOR SUSPENSION ORAL
Qty: 0 | Refills: 0 | Status: DISCONTINUED | OUTPATIENT
Start: 2017-10-02 | End: 2017-10-04

## 2017-10-02 RX ORDER — CALCIUM GLUCONATE 100 MG/ML
1 VIAL (ML) INTRAVENOUS ONCE
Qty: 0 | Refills: 0 | Status: COMPLETED | OUTPATIENT
Start: 2017-10-02 | End: 2017-10-02

## 2017-10-02 RX ORDER — SEVELAMER CARBONATE 2400 MG/1
800 POWDER, FOR SUSPENSION ORAL
Qty: 0 | Refills: 0 | Status: DISCONTINUED | OUTPATIENT
Start: 2017-10-02 | End: 2017-10-02

## 2017-10-02 RX ORDER — DEXTROSE 50 % IN WATER 50 %
50 SYRINGE (ML) INTRAVENOUS ONCE
Qty: 0 | Refills: 0 | Status: COMPLETED | OUTPATIENT
Start: 2017-10-02 | End: 2017-10-02

## 2017-10-02 RX ORDER — INSULIN HUMAN 100 [IU]/ML
10 INJECTION, SOLUTION SUBCUTANEOUS ONCE
Qty: 0 | Refills: 0 | Status: COMPLETED | OUTPATIENT
Start: 2017-10-02 | End: 2017-10-02

## 2017-10-02 RX ADMIN — Medication 25 MILLIGRAM(S): at 17:17

## 2017-10-02 RX ADMIN — Medication 200 GRAM(S): at 20:10

## 2017-10-02 RX ADMIN — CLOPIDOGREL BISULFATE 75 MILLIGRAM(S): 75 TABLET, FILM COATED ORAL at 09:40

## 2017-10-02 RX ADMIN — SEVELAMER CARBONATE 800 MILLIGRAM(S): 2400 POWDER, FOR SUSPENSION ORAL at 13:47

## 2017-10-02 RX ADMIN — TAMSULOSIN HYDROCHLORIDE 0.4 MILLIGRAM(S): 0.4 CAPSULE ORAL at 21:07

## 2017-10-02 RX ADMIN — Medication 1: at 09:39

## 2017-10-02 RX ADMIN — AMIODARONE HYDROCHLORIDE 400 MILLIGRAM(S): 400 TABLET ORAL at 05:33

## 2017-10-02 RX ADMIN — Medication 50 MILLILITER(S): at 18:53

## 2017-10-02 RX ADMIN — Medication 81 MILLIGRAM(S): at 09:40

## 2017-10-02 RX ADMIN — Medication 2: at 13:46

## 2017-10-02 RX ADMIN — Medication 5 MILLIGRAM(S): at 17:18

## 2017-10-02 RX ADMIN — SEVELAMER CARBONATE 800 MILLIGRAM(S): 2400 POWDER, FOR SUSPENSION ORAL at 17:17

## 2017-10-02 RX ADMIN — INSULIN HUMAN 10 UNIT(S): 100 INJECTION, SOLUTION SUBCUTANEOUS at 18:53

## 2017-10-02 RX ADMIN — Medication 25 MILLIGRAM(S): at 05:33

## 2017-10-02 RX ADMIN — Medication 2: at 18:00

## 2017-10-02 RX ADMIN — Medication 5 MILLIGRAM(S): at 05:33

## 2017-10-02 RX ADMIN — PANTOPRAZOLE SODIUM 40 MILLIGRAM(S): 20 TABLET, DELAYED RELEASE ORAL at 09:41

## 2017-10-02 RX ADMIN — ATORVASTATIN CALCIUM 40 MILLIGRAM(S): 80 TABLET, FILM COATED ORAL at 21:07

## 2017-10-02 NOTE — CONSULT NOTE ADULT - SUBJECTIVE AND OBJECTIVE BOX
HPI:  This is 71 yo male  who is Surinamese speaking male with PMH CAD s/p 12-14  stents placed [id number], T2 DM. 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 Patient was admitted  with pyelonephritis started on ceftriaxone. 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 Patient is now s/p james making no urine, 2nd dialysis today via femoral catheter for creatine of . Patient treated in the ICU for  NSTEMI with TWI precordial and lateral leads  with increased troponin I  0.120>0.193 . Patient was also started on Heparin gtt, loaded with ASa and plavix, TTE with EF 35 %. He was intubated with hypoxic respiratory distress from pulm edema from the acute NSTEMI as well as treated for  Hypertensive emergency with NTG gtt.  He is now transfered to [facility]  Hospital  for cardiac angiogram. Found to have multifocal infarcts likely embolic strokes, Afib on AC, dysphagia S&S - honey necktar thickened, NSTEMI, ESRD dependent on HD, CHF with worsening systolic function s/p NSTEMI.    Used Surinamese  #[id number]  Patient seen and examined at bedside. With  unable to communicate with patient, not following commands, not acknowledging yes/no questions. Per nursing staff, non-verbal and does not follow commands. Spoke to daughter Emmy over the phone. Scheduled meeting at 2PM tomorrow, it will be over the phone as she is unable to come to the hospital.     PERTINENT PM/SXH:   CVA (cerebral vascular accident)  COPD (chronic obstructive pulmonary disease)  BPH (benign prostatic hyperplasia)  Bipolar affective disorder  CKD (chronic kidney disease)  Pancreatitis  Hypertension  Dyslipidemia  Diabetes mellitus  Coronary artery disease    History of cardiac catheterization    SOCIAL HISTORY:   Significant other/partner:  [ ] YES  [x ] NO               Children:  [ x] YES  [ ] NO                   Jain/Spirituality:  Substance hx:  [ ] YES   [ x] NO                   Tobacco hx:  [ ] YES  [ x] NO                       Alcohol hx: [ ] YES  [x ] NO         Home Opioid hx:  [ ] YES  [x ] NO   Living Situation: [x ] Home  [ ] Long term care  [ ] Rehab [ ] Other    FAMILY HISTORY:    [x ] Family history non-contributory     BASELINE (I)ADLs (prior to admission):  [place]: [ ] total  [x ] moderate [ ] dependent    ADVANCE DIRECTIVES:    DNR Yes    MOLST  [ ] YES [x ] NO                      [ ] Completed  Health Care Proxy [ ] YES  [x ] NO   [ ] Completed  Living Will  [ ] YES [x ] NO             [x ] Surrogate  [ ] HCP  [ ] Guardian: Emmy Lara  Phone#: [phone]    Allergies    No Known Allergies    Intolerances    MEDICATIONS  (STANDING):  tamsulosin 0.4 milliGRAM(s) Oral at bedtime  clopidogrel Tablet 75 milliGRAM(s) Oral daily  busPIRone 5 milliGRAM(s) Oral two times a day  atorvastatin 40 milliGRAM(s) Oral at bedtime  aspirin  chewable 81 milliGRAM(s) Oral daily  metoprolol 25 milliGRAM(s) Enteral Tube two times a day  epoetin georgie Injectable 76355 Unit(s) IV Push <User Schedule>  pantoprazole  Injectable 40 milliGRAM(s) IV Push daily  insulin lispro (HumaLOG) corrective regimen sliding scale   SubCutaneous three times a day before meals  insulin lispro (HumaLOG) corrective regimen sliding scale   SubCutaneous at bedtime  dextrose 5%. 1000 milliLiter(s) (50 mL/Hr) IV Continuous <Continuous>  dextrose 50% Injectable 12.5 Gram(s) IV Push once  dextrose 50% Injectable 25 Gram(s) IV Push once  dextrose 50% Injectable 25 Gram(s) IV Push once  sevelamer hydrochloride 800 milliGRAM(s) Oral three times a day with meals    MEDICATIONS  (PRN):  dextrose Gel 1 Dose(s) Oral once PRN Blood Glucose LESS THAN 70 milliGRAM(s)/deciLiter  glucagon  Injectable 1 milliGRAM(s) IntraMuscular once PRN Glucose <70 milliGRAM(s)/deciLiter    PRESENT SYMPTOMS:  Source: [ ] Patient   [ ] Family   [x ] Team     Pain:                        [x ] No [ ] Yes             [ ] Mild [ ] Moderate [ ] Severe    Onset -  Location -  Duration -  Character -  Alleviating/Aggravating -  Radiation -  Timing -    Dyspnea:                [x ] No [ ] Yes             [ ] Mild [ ] Moderate [ ] Severe    Anxiety:                  [x ] No [ ] Yes             [ ] Mild [ ] Moderate [ ] Severe    Fatigue:                  [ x] No [ ] Yes             [ ] Mild [ ] Moderate [ ] Severe    Nausea:                  [x ] No [ ] Yes             [ ] Mild [ ] Moderate [ ] Severe    Loss of appetite:   [x ] No [ ] Yes             [ ] Mild [ ] Moderate [ ] Severe    Constipation:        [x ] No [ ] Yes             [ ] Mild [ ] Moderate [ ] Severe    Other Symptoms:  [ ] All other review of systems negative   [x ] Unable to obtain due to poor mentation     Karnofsky Performance Score/Palliative Performance Status Version 2:  30%    PHYSICAL EXAM:  Vital Signs Last 24 Hrs  T(C): 36.9 (02 Oct 2017 11:55), Max: 37.1 (02 Oct 2017 04:19)  T(F): 98.4 (02 Oct 2017 11:55), Max: 98.8 (02 Oct 2017 04:19)  HR: 77 (02 Oct 2017 11:55) (73 - 80)  BP: 121/75 (02 Oct 2017 11:55) (98/60 - 121/75)  BP(mean): --  RR: 17 (02 Oct 2017 11:55) (17 - 18)  SpO2: 97% (02 Oct 2017 11:55) (97% - 100%) I&O's Summary    01 Oct 2017 07:01  -  02 Oct 2017 07:00  --------------------------------------------------------  IN: 260 mL / OUT: 0 mL / NET: 260 mL    02 Oct 2017 07:01  -  02 Oct 2017 13:41  --------------------------------------------------------  IN: 100 mL / OUT: 0 mL / NET: 100 mL    General:  [x ] Alert  [ ] Oriented x      [ ] Lethargic  [ ] Agitated   [ ] Cachexia   [ ] Unarousable  [ ] Verbal  [x ] Non-Verbal    HEENT:  [ ] Normal   [x ] Dry mouth   [ ] ET Tube    [ ] Trach  [ ] Oral lesions    Lungs:   [ ] Clear [ ] Tachypnea  [ ] Audible excessive secretions   [x ] Rhonchi        [ ] Right [ ] Left [x ] Bilateral  [ ] Crackles        [ ] Right [ ] Left [ ] Bilateral  [ ] Wheezing     [ ] Right [ ] Left [ ] Bilateral    Cardiovascular:  [x ] Regular [ ] Irregular [ ] Tachycardia   [ ] Bradycardia  [ ] Murmur [ ] Other    Abdomen: [x ] Soft  [ ] Distended   [ ] +BS  [ ] Non tender [ ] Tender  [ ]PEG   [ ]OGT/ NGT   Last BM:       Genitourinary: [ ] Normal [ ] Incontinent   [ ] Oliguria/Anuria   [ ] James    Musculoskeletal:  [ ] Normal   [ ] Weakness  [ ] Bedbound/Wheelchair bound [ ] Edema    Neurological: [ ] No focal deficits  [ ] Cognitive impairment  [ ] Dysphagia [ ] Dysarthria [ ] Paresis [ ] Other     Skin: [ ] Normal   [ ] Pressure ulcer(s)                  [ ] Rash    LABS:                        8.9    8.9   )-----------( 248      ( 02 Oct 2017 06:10 )             26.7     10-02    139  |  93<L>  |  115<H>  ----------------------------<  149<H>  5.5<H>   |  22  |  9.02<H>    Ca    9.0      02 Oct 2017 06:10  Phos  9.3     10-02  Mg     2.8     10-02      PT/INR - ( 02 Oct 2017 06:10 )   PT: 47.6 sec;   INR: 4.28 ratio               Shock: [ ] Septic [ ] Cardiogenic [ ] Neurologic [ ] Hypovolemic  Vasopressors x   Inotrophs x     Protein Calorie Malnutrition: [ ] Mild [ ] Moderate [ ] Severe    Oral Intake: [ ] Unable/mouth care only [ ] Minimal [ ] Moderate [ ] Full Capability  Diet: [ ] NPO [ ] Tube feeds [ ] TPN [ ] Other     RADIOLOGY & ADDITIONAL STUDIES:    REFERRALS:   [ ] Chaplaincy  [ ] Hospice  [ ] Child Life  [ ] Social Work  [ ] Case management [ ] Holistic Therapy HPI:  This is 73 yo male  who is Eritrean speaking male with PMH CAD s/p 12-14  stents placed [id number], T2 DM. 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 Patient was admitted  with pyelonephritis started on ceftriaxone. 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 Patient is now s/p james making no urine, 2nd dialysis today via femoral catheter for creatine of . Patient treated in the ICU for  NSTEMI with TWI precordial and lateral leads  with increased troponin I  0.120>0.193 . Patient was also started on Heparin gtt, loaded with ASa and plavix, TTE with EF 35 %. He was intubated with hypoxic respiratory distress from pulm edema from the acute NSTEMI as well as treated for  Hypertensive emergency with NTG gtt.  He is now transfered to [facility]  for cardiac angiogram. Found to have multifocal infarcts likely embolic strokes, Afib on AC, dysphagia S&S - honey nectar thickened, NSTEMI, ESRD dependent on HD, CHF with worsening systolic function s/p NSTEMI.    Used Eritrean  #[id number]  Patient seen and examined at bedside. With  unable to communicate with patient, not following commands, not acknowledging yes/no questions. Per nursing staff, non-verbal and does not follow commands. Spoke to daughter Emmy over the phone. Scheduled meeting at 2PM tomorrow, it will be over the phone as she is unable to come to the hospital.     PERTINENT PM/SXH:   CVA (cerebral vascular accident)  COPD (chronic obstructive pulmonary disease)  BPH (benign prostatic hyperplasia)  Bipolar affective disorder  CKD (chronic kidney disease)  Pancreatitis  Hypertension  Dyslipidemia  Diabetes mellitus  Coronary artery disease    History of cardiac catheterization    SOCIAL HISTORY:   Significant other/partner:  [ ] YES  [x ] NO               Children:  [ x] YES  [ ] NO                   Hindu/Spirituality:  Substance hx:  [ ] YES   [ x] NO                   Tobacco hx:  [ ] YES  [ x] NO                       Alcohol hx: [ ] YES  [x ] NO         Home Opioid hx:  [ ] YES  [x ] NO   Living Situation: [x ] Home  [ ] Long term care  [ ] Rehab [ ] Other    FAMILY HISTORY:    [x ] Family history non-contributory     BASELINE (I)ADLs (prior to admission):  [place]: [ ] total  [x ] moderate [ ] dependent    ADVANCE DIRECTIVES:    DNR Yes    MOLST  [ ] YES [x ] NO                      [ ] Completed  Health Care Proxy [ ] YES  [x ] NO   [ ] Completed  Living Will  [ ] YES [x ] NO             [x ] Surrogate  [ ] HCP  [ ] Guardian: Emmy Lara  Phone#: [phone]    Allergies    No Known Allergies    Intolerances    MEDICATIONS  (STANDING):  tamsulosin 0.4 milliGRAM(s) Oral at bedtime  clopidogrel Tablet 75 milliGRAM(s) Oral daily  busPIRone 5 milliGRAM(s) Oral two times a day  atorvastatin 40 milliGRAM(s) Oral at bedtime  aspirin  chewable 81 milliGRAM(s) Oral daily  metoprolol 25 milliGRAM(s) Enteral Tube two times a day  epoetin georgie Injectable 38401 Unit(s) IV Push <User Schedule>  pantoprazole  Injectable 40 milliGRAM(s) IV Push daily  insulin lispro (HumaLOG) corrective regimen sliding scale   SubCutaneous three times a day before meals  insulin lispro (HumaLOG) corrective regimen sliding scale   SubCutaneous at bedtime  dextrose 5%. 1000 milliLiter(s) (50 mL/Hr) IV Continuous <Continuous>  dextrose 50% Injectable 12.5 Gram(s) IV Push once  dextrose 50% Injectable 25 Gram(s) IV Push once  dextrose 50% Injectable 25 Gram(s) IV Push once  sevelamer hydrochloride 800 milliGRAM(s) Oral three times a day with meals    MEDICATIONS  (PRN):  dextrose Gel 1 Dose(s) Oral once PRN Blood Glucose LESS THAN 70 milliGRAM(s)/deciLiter  glucagon  Injectable 1 milliGRAM(s) IntraMuscular once PRN Glucose <70 milliGRAM(s)/deciLiter    PRESENT SYMPTOMS:  Source: [ ] Patient   [ ] Family   [x ] Team     Pain:                        [x ] No [ ] Yes             [ ] Mild [ ] Moderate [ ] Severe    Onset -  Location -  Duration -  Character -  Alleviating/Aggravating -  Radiation -  Timing -    Dyspnea:                [x ] No [ ] Yes             [ ] Mild [ ] Moderate [ ] Severe    Anxiety:                  [x ] No [ ] Yes             [ ] Mild [ ] Moderate [ ] Severe    Fatigue:                  [ x] No [ ] Yes             [ ] Mild [ ] Moderate [ ] Severe    Nausea:                  [x ] No [ ] Yes             [ ] Mild [ ] Moderate [ ] Severe    Loss of appetite:   [x ] No [ ] Yes             [ ] Mild [ ] Moderate [ ] Severe    Constipation:        [x ] No [ ] Yes             [ ] Mild [ ] Moderate [ ] Severe    Other Symptoms:  [ ] All other review of systems negative   [x ] Unable to obtain due to poor mentation     Karnofsky Performance Score/Palliative Performance Status Version 2:  30%    PHYSICAL EXAM:  Vital Signs Last 24 Hrs  T(C): 36.9 (02 Oct 2017 11:55), Max: 37.1 (02 Oct 2017 04:19)  T(F): 98.4 (02 Oct 2017 11:55), Max: 98.8 (02 Oct 2017 04:19)  HR: 77 (02 Oct 2017 11:55) (73 - 80)  BP: 121/75 (02 Oct 2017 11:55) (98/60 - 121/75)  BP(mean): --  RR: 17 (02 Oct 2017 11:55) (17 - 18)  SpO2: 97% (02 Oct 2017 11:55) (97% - 100%) I&O's Summary    01 Oct 2017 07:01  -  02 Oct 2017 07:00  --------------------------------------------------------  IN: 260 mL / OUT: 0 mL / NET: 260 mL    02 Oct 2017 07:01  -  02 Oct 2017 13:41  --------------------------------------------------------  IN: 100 mL / OUT: 0 mL / NET: 100 mL    General:  [x ] Alert  [ ] Oriented x      [ ] Lethargic  [ ] Agitated   [ ] Cachexia   [ ] Unarousable  [ ] Verbal  [x ] Non-Verbal    HEENT:  [ ] Normal   [x ] Dry mouth   [ ] ET Tube    [ ] Trach  [ ] Oral lesions    Lungs:   [ ] Clear [ ] Tachypnea  [ ] Audible excessive secretions   [x ] Rhonchi        [ ] Right [ ] Left [x ] Bilateral  [ ] Crackles        [ ] Right [ ] Left [ ] Bilateral  [ ] Wheezing     [ ] Right [ ] Left [ ] Bilateral    Cardiovascular:  [x ] Regular [ ] Irregular [ ] Tachycardia   [ ] Bradycardia  [ ] Murmur [ ] Other    Abdomen: [x ] Soft  [ ] Distended   [ ] +BS  [ ] Non tender [ ] Tender  [ ]PEG   [ ]OGT/ NGT   Last BM:       Genitourinary: [ ] Normal [x ] Incontinent   [ ] Oliguria/Anuria   [ ] James    Musculoskeletal:  [ ] Normal   [ ] Weakness  [x ] Bedbound/Wheelchair bound [ ] Edema    Neurological: [ ] No focal deficits  [x ] Cognitive impairment  [x ] Dysphagia [ ] Dysarthria [ ] Paresis [ ] Other     Skin: [ ] Normal   [ ] Pressure ulcer(s)                  [ ] Rash    LABS:                        8.9    8.9   )-----------( 248      ( 02 Oct 2017 06:10 )             26.7     10-02    139  |  93<L>  |  115<H>  ----------------------------<  149<H>  5.5<H>   |  22  |  9.02<H>    Ca    9.0      02 Oct 2017 06:10  Phos  9.3     10-02  Mg     2.8     10-02      PT/INR - ( 02 Oct 2017 06:10 )   PT: 47.6 sec;   INR: 4.28 ratio      Shock: No [ ] Septic [ ] Cardiogenic [ ] Neurologic [ ] Hypovolemic  Vasopressors x None  Inotrophs x None    Protein Calorie Malnutrition: [ ] Mild [ ] Moderate [ ] Severe    Oral Intake: [ ] Unable/mouth care only [x ] Minimal [ ] Moderate [ ] Full Capability  Diet: [ ] NPO [ ] Tube feeds [ ] TPN [x ] Other     RADIOLOGY & ADDITIONAL STUDIES: reviewed.    REFERRALS:   [ ] Chaplaincy  [ ] Hospice  [ ] Child Life  [ ] Social Work  [ ] Case management [ ] Holistic Therapy

## 2017-10-02 NOTE — PROGRESS NOTE ADULT - SUBJECTIVE AND OBJECTIVE BOX
Interval History: No acute issues overnight.    Review Of Systems:  Constitutional: [ ] Fever [ ] Chills [ ] Fatigue [ ] Weight change   HEENT: [ ] Blurred vision [ ] Eye Pain [ ] Headache [ ] Runny nose [ ] Sore Throat   Respiratory: [ ] Cough [ ] Wheezing [ ] Shortness of breath  Cardiovascular: [ ] Chest Pain [ ] Palpitations [ ] CHAMPION [ ] PND [ ] Orthopnea  Gastrointestinal: [ ] Abdominal Pain [ ] Diarrhea [ ] Constipation [ ] Hemorrhoids [ ] Nausea [ ] Vomiting  Genitourinary: [ ] Nocturia [ ] Dysuria [ ] Incontinence  Extremities: [ ] Swelling [ ] Joint Pain  Neurologic: [ ] Focal deficit [ ] Paresthesias [ ] Syncope  Lymphatic: [ ] Swelling [ ] Lymphadenopathy   Skin: [ ] Rash [ ] Ecchymoses [ ] Wounds [ ] Lesions  Psychiatry: [ ] Depression [ ] Suicidal/Homicidal Ideation [ ] Anxiety [ ] Sleep Disturbances  [x ] 10 point review of systems is otherwise negative except as mentioned above            [ ]Unable to obtain    Medications:  tamsulosin 0.4 milliGRAM(s) Oral at bedtime  clopidogrel Tablet 75 milliGRAM(s) Oral daily  busPIRone 5 milliGRAM(s) Oral two times a day  atorvastatin 40 milliGRAM(s) Oral at bedtime  aspirin  chewable 81 milliGRAM(s) Oral daily  metoprolol 25 milliGRAM(s) Enteral Tube two times a day  epoetin georgie Injectable 47458 Unit(s) IV Push <User Schedule>  pantoprazole  Injectable 40 milliGRAM(s) IV Push daily  insulin lispro (HumaLOG) corrective regimen sliding scale   SubCutaneous three times a day before meals  insulin lispro (HumaLOG) corrective regimen sliding scale   SubCutaneous at bedtime  dextrose 5%. 1000 milliLiter(s) IV Continuous <Continuous>  dextrose Gel 1 Dose(s) Oral once PRN  dextrose 50% Injectable 12.5 Gram(s) IV Push once  dextrose 50% Injectable 25 Gram(s) IV Push once  dextrose 50% Injectable 25 Gram(s) IV Push once  glucagon  Injectable 1 milliGRAM(s) IntraMuscular once PRN  sevelamer hydrochloride 800 milliGRAM(s) Oral three times a day with meals    PMH/PSH/FH/SH: [x ] Unchanged  Vitals:  T(C): 36.9 (10-02-17 @ 11:55), Max: 37.1 (10-02-17 @ 04:19)  HR: 77 (10-02-17 @ 11:55) (73 - 80)  BP: 121/75 (10-02-17 @ 11:55) (98/60 - 121/75)  BP(mean): --  RR: 17 (10-02-17 @ 11:55) (17 - 18)  SpO2: 97% (10-02-17 @ 11:55) (97% - 100%)  Wt(kg): --  Daily     Daily Weight in k.9 (02 Oct 2017 04:19)  I&O's Summary    01 Oct 2017 07:  -  02 Oct 2017 07:00  --------------------------------------------------------  IN: 260 mL / OUT: 0 mL / NET: 260 mL    02 Oct 2017 07:01  -  02 Oct 2017 15:24  --------------------------------------------------------  IN: 340 mL / OUT: 0 mL / NET: 340 mL        Physical Exam:  Appearance: NAD  Eyes: PERRL, EOMI  HENT: Normal oral [place] NC/AT  Lymphatic: No lymphadenopathy  Cardiovascular: Normal S1 S2, No JVD, No murmurs, No edema  Respiratory: Lungs clear to auscultation	  Gastrointestinal:  Soft, Non-tender, + BS	  Skin: ecchymosis on left proximal anterior thigh which extends to LLQ/Left flank	  Neurologic: Nonverbal  Extremities: Normal range of motion, No clubbing, cyanosis or edema  Vascular: Peripheral pulses palpable 2+ bilaterally    Labs:                        8.9    8.9   )-----------( 248      ( 02 Oct 2017 06:10 )             26.7     10    139  |  93<L>  |  115<H>  ----------------------------<  149<H>  5.5<H>   |  22  |  9.02<H>    Ca    9.0      02 Oct 2017 06:10  Phos  9.3     10-02  Mg     2.8     10-02      PT/INR - ( 02 Oct 2017 06:10 )   PT: 47.6 sec;   INR: 4.28 ratio        Interpretation of Telemetry: sinus 60-90's Interval History: No acute issues overnight. Pt non-verbal but able to follow commands.    Review Of Systems:  Constitutional: [ ] Fever [ ] Chills [ ] Fatigue [ ] Weight change   HEENT: [ ] Blurred vision [ ] Eye Pain [ ] Headache [ ] Runny nose [ ] Sore Throat   Respiratory: [ ] Cough [ ] Wheezing [ ] Shortness of breath  Cardiovascular: [ ] Chest Pain [ ] Palpitations [ ] CHAMPION [ ] PND [ ] Orthopnea  Gastrointestinal: [ ] Abdominal Pain [ ] Diarrhea [ ] Constipation [ ] Hemorrhoids [ ] Nausea [ ] Vomiting  Genitourinary: [ ] Nocturia [ ] Dysuria [ ] Incontinence  Extremities: [ ] Swelling [ ] Joint Pain  Neurologic: [ ] Focal deficit [ ] Paresthesias [ ] Syncope  Lymphatic: [ ] Swelling [ ] Lymphadenopathy   Skin: [ ] Rash [ ] Ecchymoses [ ] Wounds [ ] Lesions  Psychiatry: [ ] Depression [ ] Suicidal/Homicidal Ideation [ ] Anxiety [ ] Sleep Disturbances  [x ] 10 point review of systems is otherwise negative except as mentioned above            [ ]Unable to obtain    Medications:  tamsulosin 0.4 milliGRAM(s) Oral at bedtime  clopidogrel Tablet 75 milliGRAM(s) Oral daily  busPIRone 5 milliGRAM(s) Oral two times a day  atorvastatin 40 milliGRAM(s) Oral at bedtime  aspirin  chewable 81 milliGRAM(s) Oral daily  metoprolol 25 milliGRAM(s) Enteral Tube two times a day  epoetin georgie Injectable 41281 Unit(s) IV Push <User Schedule>  pantoprazole  Injectable 40 milliGRAM(s) IV Push daily  insulin lispro (HumaLOG) corrective regimen sliding scale   SubCutaneous three times a day before meals  insulin lispro (HumaLOG) corrective regimen sliding scale   SubCutaneous at bedtime  dextrose 5%. 1000 milliLiter(s) IV Continuous <Continuous>  dextrose Gel 1 Dose(s) Oral once PRN  dextrose 50% Injectable 12.5 Gram(s) IV Push once  dextrose 50% Injectable 25 Gram(s) IV Push once  dextrose 50% Injectable 25 Gram(s) IV Push once  glucagon  Injectable 1 milliGRAM(s) IntraMuscular once PRN  sevelamer hydrochloride 800 milliGRAM(s) Oral three times a day with meals    PMH/PSH/FH/SH: [x ] Unchanged  Vitals:  T(C): 36.9 (10-02-17 @ 11:55), Max: 37.1 (10-02-17 @ 04:19)  HR: 77 (10-02-17 @ 11:55) (73 - 80)  BP: 121/75 (10-02-17 @ 11:55) (98/60 - 121/75)  BP(mean): --  RR: 17 (10-02-17 @ 11:55) (17 - 18)  SpO2: 97% (10-02-17 @ 11:55) (97% - 100%)  Wt(kg): --  Daily     Daily Weight in k.9 (02 Oct 2017 04:19)  I&O's Summary    01 Oct 2017 07:  -  02 Oct 2017 07:00  --------------------------------------------------------  IN: 260 mL / OUT: 0 mL / NET: 260 mL    02 Oct 2017 07:01  -  02 Oct 2017 15:24  --------------------------------------------------------  IN: 340 mL / OUT: 0 mL / NET: 340 mL        Physical Exam:  Appearance: NAD; follows commands  Eyes: PERRL, EOMI  HENT: Normal oral [place] NC/AT  Lymphatic: No lymphadenopathy  Cardiovascular: Normal S1 S2, No JVD, No murmurs, No edema  Respiratory: Lungs clear to auscultation	  Gastrointestinal:  Soft, Non-tender, + BS	  Skin: ecchymosis on left proximal anterior thigh which extends to LLQ/Left flank	  Neurologic: Nonverbal; able to move left arm and leg  Extremities: Normal range of motion, No clubbing, cyanosis or edema  Vascular: Peripheral pulses palpable 2+ bilaterally    Labs:                        8.9    8.9   )-----------( 248      ( 02 Oct 2017 06:10 )             26.7     10-02    139  |  93<L>  |  115<H>  ----------------------------<  149<H>  5.5<H>   |  22  |  9.02<H>    Ca    9.0      02 Oct 2017 06:10  Phos  9.3     10-02  Mg     2.8     10      PT/INR - ( 02 Oct 2017 06:10 )   PT: 47.6 sec;   INR: 4.28 ratio        Interpretation of Telemetry: sinus 60-90's

## 2017-10-02 NOTE — PROGRESS NOTE ADULT - ATTENDING COMMENTS
Agree with R1/R2 resident plan of care.  71yo M with PMH of extensive CAD, T2DM, CKD, prior CVA transferred from OSH for NSTEMI s/p Impella-assisted LHC w/ BABAR x5 and obstructive nephropathy requiring HD transferred from CCU after PEA/Vtach arrest with ROSC c/b new embolic CVA.  - Afebrile, no acute event on Tele, Vitals stable. No distress.  - Will call Family for a meeting with palliative care. If decision is made no to have HD, will ask Hospice consult.  - Spoke to Renal, will not start ACE I now. If family agrres with HD, will arrange permcath and d/c to Rehab. Hospice will not take a patient with Dx of ESRD and HD.  - c/w ASA, Plavix, BB, statin and amiodarone   He is DNR

## 2017-10-02 NOTE — PROVIDER CONTACT NOTE (CRITICAL VALUE NOTIFICATION) - RECOMMENDATIONS
d50 and regulaar insulin
CBC also sent.  Reassess after receiving results of CBC.
Dr. Lima contacted and aware.
COntinue current management, continue to monitor
No heparin gtt currently, continue to monitor for s/s bleeding
continue to monitor and assess
give 1/2 units PRBC, monitor for s/s of bleeding.
waiting serum cbc, give blood as needed.
Continue to monitor.

## 2017-10-02 NOTE — PROGRESS NOTE ADULT - PROBLEM SELECTOR PLAN 6
- systolic function worsened s/p NSTEMI/arrest  -per EP, daughter declined AICD and would like to pursue palliative  -fluid removal by intermittent HD

## 2017-10-02 NOTE — PROGRESS NOTE ADULT - PROBLEM SELECTOR PLAN 5
- s/p HD  -will need to transition to Permacath for HD access, pending GOC/pall care with family  -avoid nephrotoxins  -f/u Renal recs  - will d/w renal to restart Ace-I

## 2017-10-02 NOTE — PROVIDER CONTACT NOTE (CRITICAL VALUE NOTIFICATION) - PERSON GIVING RESULT:
Ariadne Emanuel
Ariadne KNOWLES
Ariadne Mccall
Dany KNOWLES
Mary Baird
Mayito Yeh, lAB
Dany Robles
mona bermudez
Gail Singh/ Geovanni

## 2017-10-02 NOTE — CONSULT NOTE ADULT - PROBLEM SELECTOR RECOMMENDATION 3
Patient with multiple c-morbidities, Poor baseline s/p CVA. Needs assistance with all ADLs. Needs support. Pending discussion with daughter.

## 2017-10-02 NOTE — PROGRESS NOTE ADULT - SUBJECTIVE AND OBJECTIVE BOX
24H hour events:  No acute events    MEDICATIONS:  tamsulosin 0.4 milliGRAM(s) Oral at bedtime  clopidogrel Tablet 75 milliGRAM(s) Oral daily  aspirin  chewable 81 milliGRAM(s) Oral daily  metoprolol 25 milliGRAM(s) Enteral Tube two times a day        busPIRone 5 milliGRAM(s) Oral two times a day    pantoprazole  Injectable 40 milliGRAM(s) IV Push daily    atorvastatin 40 milliGRAM(s) Oral at bedtime  insulin lispro (HumaLOG) corrective regimen sliding scale   SubCutaneous three times a day before meals  insulin lispro (HumaLOG) corrective regimen sliding scale   SubCutaneous at bedtime  dextrose Gel 1 Dose(s) Oral once PRN  dextrose 50% Injectable 12.5 Gram(s) IV Push once  dextrose 50% Injectable 25 Gram(s) IV Push once  dextrose 50% Injectable 25 Gram(s) IV Push once  glucagon  Injectable 1 milliGRAM(s) IntraMuscular once PRN    epoetin georgie Injectable 93131 Unit(s) IV Push <User Schedule>  dextrose 5%. 1000 milliLiter(s) IV Continuous <Continuous>        PHYSICAL EXAM:  T(C): 36.9 (10-02-17 @ 11:55), Max: 37.1 (10-02-17 @ 04:19)  HR: 77 (10-02-17 @ 11:55) (73 - 80)  BP: 121/75 (10-02-17 @ 11:55) (98/60 - 121/75)  RR: 17 (10-02-17 @ 11:55) (17 - 18)  SpO2: 97% (10-02-17 @ 11:55) (97% - 100%)  Wt(kg): --  I&O's Summary    01 Oct 2017 07:01  -  02 Oct 2017 07:00  --------------------------------------------------------  IN: 260 mL / OUT: 0 mL / NET: 260 mL    02 Oct 2017 07:01  -  02 Oct 2017 15:28  --------------------------------------------------------  IN: 340 mL / OUT: 0 mL / NET: 340 mL        Appearance: Normal	  HEENT:   Normal oral mucosa  Lymphatic: No lymphadenopathy  Cardiovascular: Normal S1 S2,         No JVD,      No murmurs,      No edema  Respiratory: Lungs clear to auscultation	  Psychiatry: Mood & affect appropriate  Gastrointestinal:  Soft, Non-tender	  Skin: No rashes, No ecchymoses, No cyanosis	  Neurologic: Non-focal  Extremities: Normal range of motion, No clubbing, cyanosis   Vascular: warm extremities        LABS:	 	    CBC Full  -  ( 02 Oct 2017 06:10 )  WBC Count : 8.9 K/uL  Hemoglobin : 8.9 g/dL  Hematocrit : 26.7 %  Platelet Count - Automated : 248 K/uL  Mean Cell Volume : 91.9 fl  Mean Cell Hemoglobin : 30.8 pg  Mean Cell Hemoglobin Concentration : 33.5 gm/dL  Auto Neutrophil # : 6.9 K/uL  Auto Lymphocyte # : 1.2 K/uL  Auto Monocyte # : 0.6 K/uL  Auto Eosinophil # : 0.2 K/uL  Auto Basophil # : 0.0 K/uL  Auto Neutrophil % : 78.1 %  Auto Lymphocyte % : 12.9 %  Auto Monocyte % : 6.9 %  Auto Eosinophil % : 1.8 %  Auto Basophil % : 0.3 %    10-02    139  |  93<L>  |  115<H>  ----------------------------<  149<H>  5.5<H>   |  22  |  9.02<H>  10-01    140  |  96  |  81<H>  ----------------------------<  180<H>  5.2   |  22  |  6.77<H>    Ca    9.0      02 Oct 2017 06:10  Ca    9.0      01 Oct 2017 07:58  Phos  9.3     10-02  Phos  6.7     10-01  Mg     2.8     10-02  Mg     2.6     10-01        proBNP:     TELEMETRY:  S70

## 2017-10-02 NOTE — PROGRESS NOTE ADULT - PROBLEM SELECTOR PLAN 1
-multifocal infarcts concerning for embolic strokes  - heparin to warfarin bridge, holding warfarin as INR supratherapeutic  - Atorvastin 40mg  - S&S assessment - honey nectar cons.  - PT/OT/Neuro following

## 2017-10-02 NOTE — PROVIDER CONTACT NOTE (CRITICAL VALUE NOTIFICATION) - SITUATION
Potassium 6.2
Patient cardiac arrested the floor
Potassium 6.4 Not hemolyized
XJV=569.9
lactate=5.2
patient hemoglobin/hemocrit  6.9/21.7
pt hemocrit 22 on ABG
PTT= 163.5
k 6.2

## 2017-10-02 NOTE — CONSULT NOTE ADULT - PROBLEM SELECTOR RECOMMENDATION 4
Patient is DNR/DNI. Surrogate is daughter Maggie Lara. Family meeting scheduled for tomorrow 10/3 to discuss: nutritional goals, HD and further plans for the future.

## 2017-10-02 NOTE — CONSULT NOTE ADULT - PROBLEM SELECTOR RECOMMENDATION 9
Multifocal infarcts concerning for embolic strokes. Currently bedbound, non-verbal, does not follow commands. S/p S&S assessment - honey nectar consistency. High risk of aspiration. PT/OT/Neuro following.

## 2017-10-02 NOTE — PROGRESS NOTE ADULT - ATTENDING COMMENTS
Agree with above. Await results of tomorrow's [facility] meeting for decision regarding permanent pacing.

## 2017-10-02 NOTE — CONSULT NOTE ADULT - PROBLEM SELECTOR RECOMMENDATION 2
S/p HD. Scheduled family meeting with patient's daughter at 2PM tomorrow 10/3, to discuss goals of therapy to further assess if pt should have Permacath placed for HD access.

## 2017-10-02 NOTE — CONSULT NOTE ADULT - ATTENDING COMMENTS
I have seen and examined this patient with the stroke neurology team.     History was reviewed with the patient and/or available family members.   ROS: All negative except documented in the HPI.   Neurological exam was performed and agree with exam as documented above.   Laboratory results and imaging studies were reviewed by me.   I agree with the neurology resident note as documented above.    72 years old man with multiple vascular risk factors including HTN, DM II, HPLD, CVA 4 years ago, CKD and age is evaluated at [facility] for right-sided weakness. He was initially admitted to OS with NSTEMI and subsequently transferred to [facility] for coronary angiogram. Hospital course was complicated by hypotension and cardiac arrest. He was reported to have right-sided weakness since then. CT brain showed old left MCA distribution stroke involving internal capsule and corona radiata but did not show any acute intracranial pathology.     Impression:  Cerebral embolism/thrombosis with cerebral infarction. Probably the left MCA distribution stroke - of undetermined etiology versus metabolic insufficiency causing reactivation of old stroke symptoms (MICROs)    Plan:  - Would benefit from continuing with single antiplatelet agent for secondary stroke prevention. No absolute neurological contraindication to dual antiplatelet therapy, if indicated from cardiac standpoint  - Atorvastatin as per cardiac indications and LDL results  - Awaiting MRI brain, MRA head and neck  - Transthoracic echocardiogram with bubble study and continue telemetry to screen for cardiac source of embolism  - Supportive care as per primary team  - PT/OT/speech and swallow evaluation to be considered once medically stable  - Would continue to follow
Agree with above fellow's note.     Onset of Wenkebach progressing to complete heart block followed by a VF arrest 7 days post revascularization. Prolonged period of resuscitation with subsequent stroke leaving the patient aphasic and hemiparetic but alert. Post code LV function at 20%. At this point he is a candidate for either a dual chamber pacemaker or dual chamber ICD, the latter option, given his age and functional status, pending a goals of care discussion with the family.
Agree with above. Will need to clarify patient's baseline functional and mental status. Family meeting with daughter via phone tomorrow at 2pm.

## 2017-10-02 NOTE — PROVIDER CONTACT NOTE (CRITICAL VALUE NOTIFICATION) - ASSESSMENT
asymptomatic
OCTAVIA. NSR of tele.
No overt s/s bleeding present
Pt A&oX3 Pt. denies SOB, chest pain, difficulty breathing. .
Pt intubated, on pressor support.
patient with left groin hematoma, site ecchymotic, no overt s/s of bleeding.
pt with left groin hematoma, site ecchymotic, no overt signs of bleeding at this time,
No overt signs or symptoms of bleeding. Pt not moving right arm. Pt moving left arm. Code stroke called. Labs obtained.

## 2017-10-02 NOTE — PROGRESS NOTE ADULT - ASSESSMENT
71 y/o M w/ a PMHx of extensive CAD, T2DM, ESRD new HD, prior CVA w/ multiple sites of infarcts who was transferred from OSH for NSTEMI s/p impella assisted LHC and pyelonephritis with course c/b obstructive nephropathy requiring HD transferred from floor to CCU after cardiac arrest, v-tach and subsequent ROSC w/ hospital course further c/b embolic CVA (9/23). Pt remains status quo today.  He has a guarded prognosis but otherwise stable. Creatinine has been notable worsening and there is some consideration about permanent dialysis pending family discussion     1. NSTEMI c/b VT arrest s/p impella-assisted PCI with BABAR x 5 to LAD/RCA. Now stable  -cont. DAPT, lipitor 40mg QD, metoprolol 25mg PO BID  - cont holding ACE/ ARB       2. ischemic cardiomyopathy  - NYHA 2-3, ACC/AHA C-currently euvolemic  - hold ACE and aldactone  - c/w metoprolol at 25mg bid  -EP has been consulted and there is no plan for ICD at this time for secondary prevention    3. atrial fibrillation/flutter   -currently in NSR  -cont PO amio 200mg QD  -hold warfarin in the setting of supratheraputic INR. 73 y/o M w/ a PMHx of extensive CAD, T2DM, ESRD new HD, prior CVA w/ multiple sites of infarcts who was transferred from OSH for NSTEMI s/p impella assisted LHC and pyelonephritis with course c/b obstructive nephropathy requiring HD transferred from floor to CCU after cardiac arrest, v-tach and subsequent ROSC w/ hospital course further c/b embolic CVA (9/23). Pt remains status quo today.  He has a guarded prognosis but otherwise stable. Creatinine has been notable worsening and there is some consideration about permanent dialysis pending family discussion     1. NSTEMI c/b VT arrest s/p impella-assisted PCI with BABAR x 5 to LAD/RCA. Now stable  -cont. DAPT, lipitor 40mg QD, metoprolol 25mg PO BID  - cont holding ACE/ ARB       2. ischemic cardiomyopathy  - NYHA 2-3, ACC/AHA C-currently euvolemic  - hold ACE and aldactone  - c/w metoprolol at 25mg bid  -EP has been consulted and there is no plan for ICD at this time for secondary prevention    3. atrial fibrillation/flutter   -currently in NSR  -cont PO amio 200mg QD  -hold warfarin in the setting of supratheraputic INR.

## 2017-10-02 NOTE — PROGRESS NOTE ADULT - PROBLEM SELECTOR PLAN 2
-c/w PO Amio, load to 10g (completes today)  - hold warfarin  -daily INR check   -c/w Tele monitoring  -Cards following  - supratheurapeutic INR

## 2017-10-02 NOTE — PROVIDER CONTACT NOTE (CRITICAL VALUE NOTIFICATION) - TEST AND RESULT REPORTED:
Potassium 6.2
H/h 6.9/21.7
HCT 22
Hemocrit 22
JQB=209.9
Potassium 6.4 Not hemolyized
lactate=5.2
PTT= 163.5
k 6.2

## 2017-10-02 NOTE — PROGRESS NOTE ADULT - ASSESSMENT
72yoM, hx of CAD (DESx18), CKDm DM2, COPD, CVA (remote, no deficits), Bipolar (not on meds). Presented with flank pain, developed NSTEMI, hypertensive emergency,  TWI. [facility] on 9/14 with BABAR to pRCA.  9/21 had cardiac arrest, tele shows bradycardia, evolving intop VF.  18 minutes CPR, intubated.   9/23 with new CVA dx, new aphasia, weakness. 9/27 EF 20%      VF arrest  -  Arrest mediated by bradycardia. A single lead ppm is a minimally invasive short procedure which could help decrease risk of future arrest.   - pt failed s/s. Palliative care meeting planned tomorrow.   - If family decides to stop HD, palliative feeds, then PPM seems not in line with goals.  - if family decides to give PEG, cont HD, then PPM seems reasonable.   - EP to continue to follow  - please maintain K>4 , Mg>2  - For any questions or If there are any concerns, please call [phone] during daytime, covered by [phone] after-hours

## 2017-10-02 NOTE — CHART NOTE - NSCHARTNOTEFT_GEN_A_CORE
Called by RN at 2130 for ecchymosis on pt's L groin extending to flank and rib cage noticed by pt's brother who stated it appeared worse. Pt examined at bedside with brother present. Unable to assess ROS as pt is now nonverbal after recent CVA, however, per pt's brother pt developed ecchymosis after LHC two weeks ago. His coumadin is currently being held in setting of supratherapeutic INR. Of note, pt's most recent INR is 4.28 from 3.    Vital signs:  Temp:   T(C): 37 (10-02-17 @ 20:15), Max: 37.1 (10-02-17 @ 04:19)  HR: 77 (10-02-17 @ 20:15) (77 - 82)  BP: 118/72 (10-02-17 @ 20:15) (111/69 - 127/76)  RR: 18 (10-02-17 @ 20:15) (17 - 18)  SpO2: 98% (10-02-17 @ 20:15) (97% - 98%)    Plan:   -Continue to monitor VS for hypotension/tachycardia  -To go for HD this evening  - Called by RN at 2130 for ecchymosis on pt's L groin extending to flank and rib cage noticed by pt's brother who stated it appeared worse. Pt examined at bedside with brother present. Unable to assess ROS as pt is now nonverbal after recent CVA, however, per pt's brother pt developed ecchymosis after LHC two weeks ago. His coumadin is currently being held in setting of supratherapeutic INR. Of note, pt's most recent INR increased from 3.68 to 4.28. H&H stable from prior.     Vital signs:  T 98.6  HR: 77  BP: 118/72  RR: 18  SpO2: 98% on RA    PE:  Gen: Lying comfortably, NAD  Card: RRR, normal S1/S2, no murmurs  Abd: Soft, nondistended, nontender to palpation  Skin: Large ecchymosis extending from L groin up L flank and ending at ribcage; no tenderness to palpation    A/P:  72M with CAD s/p recent LHC with BABAR x5, afib on coumadin, obstructive nephropathy requiring HD transferred from CCU after cardiac arrest s/p ROSC now with new embolic CVA.   -Per Cardiology note from today, ecchymosis appears to be stable  -VSS  -Continue to monitor VS for hypotension/tachycardia  -To go for HD this evening  -Holding coumadin for supratherapeutic INR

## 2017-10-02 NOTE — PROGRESS NOTE ADULT - SUBJECTIVE AND OBJECTIVE BOX
Patient is a 72y old  Male who presents with a chief complaint of s/p cardiac cath (22 Sep 2017 12:34)        SUBJECTIVE / OVERNIGHT EVENTS:  Phone used for English-speaking patient. Pt non verbal, in no apparent distress. Pt unresponsive on R extremities.       MEDICATIONS  (STANDING):  tamsulosin 0.4 milliGRAM(s) Oral at bedtime  clopidogrel Tablet 75 milliGRAM(s) Oral daily  busPIRone 5 milliGRAM(s) Oral two times a day  atorvastatin 40 milliGRAM(s) Oral at bedtime  aspirin  chewable 81 milliGRAM(s) Oral daily  metoprolol 25 milliGRAM(s) Enteral Tube two times a day  epoetin georgie Injectable 39856 Unit(s) IV Push <User Schedule>  pantoprazole  Injectable 40 milliGRAM(s) IV Push daily  insulin lispro (HumaLOG) corrective regimen sliding scale   SubCutaneous three times a day before meals  insulin lispro (HumaLOG) corrective regimen sliding scale   SubCutaneous at bedtime  dextrose 5%. 1000 milliLiter(s) (50 mL/Hr) IV Continuous <Continuous>  dextrose 50% Injectable 12.5 Gram(s) IV Push once  dextrose 50% Injectable 25 Gram(s) IV Push once  dextrose 50% Injectable 25 Gram(s) IV Push once    MEDICATIONS  (PRN):  dextrose Gel 1 Dose(s) Oral once PRN Blood Glucose LESS THAN 70 milliGRAM(s)/deciLiter  glucagon  Injectable 1 milliGRAM(s) IntraMuscular once PRN Glucose <70 milliGRAM(s)/deciLiter      T(C): 37.1 (10-02-17 @ 04:19), Max: 37.1 (10-02-17 @ 04:19)  HR: 77 (10-02-17 @ 04:19) (73 - 80)  BP: 111/69 (10-02-17 @ 04:19) (98/60 - 113/70)  RR: 18 (10-02-17 @ 04:19) (18 - 18)  SpO2: 98% (10-02-17 @ 04:19) (98% - 100%)  CAPILLARY BLOOD GLUCOSE  214 (01 Oct 2017 20:22)  127 (01 Oct 2017 17:49)  151 (01 Oct 2017 12:39)  168 (01 Oct 2017 08:45)        I&O's Summary    01 Oct 2017 07:01  -  02 Oct 2017 07:00  --------------------------------------------------------  IN: 260 mL / OUT: 0 mL / NET: 260 mL        PHYSICAL EXAM  GENERAL: NAD, well-developed  HEAD:  Atraumatic, Normocephalic  EYES: EOMI, PERRLA, conjunctiva and sclera clear  NECK: Supple, No JVD, [id number], well defined non tender mass in posterior auricular region  CHEST/LUNG: Clear to auscultation bilaterally; b/l wheezing  HEART: Regular rate and rhythm; No murmurs, rubs, or gallops  ABDOMEN: Soft, Nontender, Nondistended; Bowel sounds present  EXTREMITIES:  2+ Peripheral Pulses, No clubbing, cyanosis, or edema  PSYCH: Alert, non verbal, 5/5 strength in RUE and RLE  SKIN: shiley in R upper thorax    LABS:                        8.9    8.9   )-----------( 248      ( 02 Oct 2017 06:10 )             26.7     10-02    139  |  93<L>  |  115<H>  ----------------------------<  149<H>  5.5<H>   |  22  |  9.02<H>    Ca    9.0      02 Oct 2017 06:10  Phos  9.3     10-02  Mg     2.8     10-02      PT/INR - ( 02 Oct 2017 06:10 )   PT: 47.6 sec;   INR: 4.28 ratio         RADIOLOGY & ADDITIONAL TESTS:    Imaging Personally Reviewed:  Consultant(s) Notes Reviewed:  Cardiology, Nephrology  Care Discussed with Consultants/Other Providers: Patient is a 72y old  Male who presents with a chief complaint of s/p cardiac cath.        SUBJECTIVE / OVERNIGHT EVENTS:  Phone used for Azerbaijani-speaking patient. Pt non verbal, in no apparent distress. Pt has been weak on R extremities.       MEDICATIONS  (STANDING):  tamsulosin 0.4 milliGRAM(s) Oral at bedtime  clopidogrel Tablet 75 milliGRAM(s) Oral daily  busPIRone 5 milliGRAM(s) Oral two times a day  atorvastatin 40 milliGRAM(s) Oral at bedtime  aspirin  chewable 81 milliGRAM(s) Oral daily  metoprolol 25 milliGRAM(s) Enteral Tube two times a day  epoetin georgie Injectable 56760 Unit(s) IV Push <User Schedule>  pantoprazole  Injectable 40 milliGRAM(s) IV Push daily  insulin lispro (HumaLOG) corrective regimen sliding scale   SubCutaneous three times a day before meals  insulin lispro (HumaLOG) corrective regimen sliding scale   SubCutaneous at bedtime  dextrose 5%. 1000 milliLiter(s) (50 mL/Hr) IV Continuous <Continuous>  dextrose 50% Injectable 12.5 Gram(s) IV Push once  dextrose 50% Injectable 25 Gram(s) IV Push once  dextrose 50% Injectable 25 Gram(s) IV Push once    MEDICATIONS  (PRN):  dextrose Gel 1 Dose(s) Oral once PRN Blood Glucose LESS THAN 70 milliGRAM(s)/deciLiter  glucagon  Injectable 1 milliGRAM(s) IntraMuscular once PRN Glucose <70 milliGRAM(s)/deciLiter      T(C): 37.1 (10-02-17 @ 04:19), Max: 37.1 (10-02-17 @ 04:19)  HR: 77 (10-02-17 @ 04:19) (73 - 80)  BP: 111/69 (10-02-17 @ 04:19) (98/60 - 113/70)  RR: 18 (10-02-17 @ 04:19) (18 - 18)  SpO2: 98% (10-02-17 @ 04:19) (98% - 100%)  CAPILLARY BLOOD GLUCOSE  214 (01 Oct 2017 20:22)  127 (01 Oct 2017 17:49)  151 (01 Oct 2017 12:39)  168 (01 Oct 2017 08:45)        I&O's Summary    01 Oct 2017 07:01  -  02 Oct 2017 07:00  --------------------------------------------------------  IN: 260 mL / OUT: 0 mL / NET: 260 mL        PHYSICAL EXAM  GENERAL: NAD, well-developed  HEAD:  Atraumatic, Normocephalic  EYES: EOMI, PERRLA, conjunctiva and sclera clear  NECK: Supple, No JVD, [id number], well defined non tender mass in posterior auricular region  CHEST/LUNG: Clear to auscultation bilaterally; b/l wheezing  HEART: Regular rate and rhythm; No murmurs, rubs, or gallops  ABDOMEN: Soft, Nontender, Nondistended; Bowel sounds present  EXTREMITIES:  2+ Peripheral Pulses, No clubbing, cyanosis, or edema  PSYCH: Alert, non verbal, 5/5 strength in RUE and RLE  SKIN: shiley in R upper thorax    LABS:                        8.9    8.9   )-----------( 248      ( 02 Oct 2017 06:10 )             26.7     10-02    139  |  93<L>  |  115<H>  ----------------------------<  149<H>  5.5<H>   |  22  |  9.02<H>    Ca    9.0      02 Oct 2017 06:10  Phos  9.3     10-02  Mg     2.8     10-02      PT/INR - ( 02 Oct 2017 06:10 )   PT: 47.6 sec;   INR: 4.28 ratio         RADIOLOGY & ADDITIONAL TESTS:    Imaging Personally Reviewed:  Consultant(s) Notes Reviewed:  Cardiology, Nephrology  Care Discussed with Consultants/Other Providers:

## 2017-10-02 NOTE — PROGRESS NOTE ADULT - SUBJECTIVE AND OBJECTIVE BOX
No distress, non-verbal post CVA    Vital Signs Last 24 Hrs  T(C): 36.9 (10-02-17 @ 11:55), Max: 37.1 (10-02-17 @ 04:19)  T(F): 98.4 (10-02-17 @ 11:55), Max: 98.8 (10-02-17 @ 04:19)  HR: 77 (10-02-17 @ 11:55) (73 - 80)  BP: 121/75 (10-02-17 @ 11:55) (98/60 - 121/75)  BP(mean): --  RR: 17 (10-02-17 @ 11:55) (17 - 18)  SpO2: 97% (10-02-17 @ 11:55) (97% - 100%)                          8.9    8.9   )-----------( 248      ( 02 Oct 2017 06:10 )             26.7     02 Oct 2017 06:10    139    |  93     |  115    ----------------------------<  149    5.5     |  22     |  9.02     Ca    9.0        02 Oct 2017 06:10  Phos  9.3       02 Oct 2017 06:10  Mg     2.8       02 Oct 2017 06:10      General: NAD  Respiratory: good air entry  Cardiovascular: S1 S2   Gastrointestinal: soft, ND, BS present  Extremities:  no edema    tamsulosin 0.4 milliGRAM(s) Oral at bedtime  clopidogrel Tablet 75 milliGRAM(s) Oral daily  busPIRone 5 milliGRAM(s) Oral two times a day  atorvastatin 40 milliGRAM(s) Oral at bedtime  aspirin  chewable 81 milliGRAM(s) Oral daily  metoprolol 25 milliGRAM(s) Enteral Tube two times a day  epoetin georgie Injectable 08558 Unit(s) IV Push <User Schedule>  pantoprazole  Injectable 40 milliGRAM(s) IV Push daily  insulin lispro (HumaLOG) corrective regimen sliding scale   SubCutaneous three times a day before meals  insulin lispro (HumaLOG) corrective regimen sliding scale   SubCutaneous at bedtime  dextrose 5%. 1000 milliLiter(s) IV Continuous <Continuous>  dextrose Gel 1 Dose(s) Oral once PRN  dextrose 50% Injectable 12.5 Gram(s) IV Push once  dextrose 50% Injectable 25 Gram(s) IV Push once  dextrose 50% Injectable 25 Gram(s) IV Push once  glucagon  Injectable 1 milliGRAM(s) IntraMuscular once PRN  sevelamer hydrochloride 800 milliGRAM(s) Oral three times a day with meals      Assessment and Plan:   		  CM, NSTEMI, s/p cath, PCI  Afib, s/p CVA  S/p arrest, intubation  S/p LIN on CKD in setting of non-fx L kidney and R hydro due to stone plus hemodynamic/dye injury  Started on HD in [facility]  Per most recent CT A/P passed stone and hydro resolved  No signs of renal fx recovery so far  Will continue HD via temporary RIJ HD cath TTS w/fluid removal as able  Procrit w/HD  Renal diet  While most likely pt will require long term dialysis, at this point would avoid ACE/ARB or other nephrotoxins  Plans for perm HD access pending d/w family/HCP re: goal of care  D/w

## 2017-10-02 NOTE — CONSULT NOTE ADULT - ASSESSMENT
This is 71 yo male  who is Jordanian speaking male with PMH CAD s/p 12-14  stents placed [id number], T2 DM. HTN HLD, CVA 4 years ago with residual left sided facial weaknes, radiculopathy with herniated disc, COPD, CKD creatine 2 months ago 1.2 ( nephrologist is Dr berg [phone]) , bipolar  ( no meds followed by psychiatry) , chronic kidney stones and has had urethral tents. Transfered to [facility] from [facility] for cardiac angiogram. Found to have multifocal infarcts likely embolic strokes, Afib on AC, dysphagia S&S - honey nectar thickened, NSTEMI, ESRD dependent on HD, CHF with worsening systolic function s/p NSTEMI.

## 2017-10-02 NOTE — PROGRESS NOTE ADULT - PROBLEM SELECTOR PLAN 10
-DVT PPX: heparin gtt  -pall care consult placed, pending family meeting on monday. Daughter will check with job if monday is possible.

## 2017-10-02 NOTE — PROVIDER CONTACT NOTE (CRITICAL VALUE NOTIFICATION) - ACTION/TREATMENT ORDERED:
Continue to monitor. No heparin.
d50 and regulaar janneth
Dr. Lima contacted and aware. Dialysis scheduled this AM. Will continue to monitor.
Wait for CBC results and reassess.
COntinue current management, continue to monitor
Continue to monitor and assess
No heparin gtt currently, continue to monitor for s/s bleeding
give 1/2 units PRBC, monitor for s/s of bleeding.
waiting serum cbc, give blood as needed.

## 2017-10-02 NOTE — PROGRESS NOTE ADULT - PROBLEM SELECTOR PLAN 4
-s/p Impella-assisted Cath with BABAR x5 to LAD/RCA  -c/w DAPT, Statin, and BetaBlocker  -holding ACEI given LIN/CKD, discuss with Renal if can restart, atleast on non HD days  - cards following

## 2017-10-02 NOTE — PROVIDER CONTACT NOTE (CRITICAL VALUE NOTIFICATION) - BACKGROUND
diaylsis pt
Patient admitted on9/12/17 for NSTEMI
PMH :CVA, COPD.  DX: NSTEMI
Patient received 1/2 unit of blood
Pt admitted for NSTEMI, c/b code blue on 2DSU. Pt tx to CCU for management. HX new ESRD
Pt s./p CODE BLUE On 2DSU, transfer to CCU
patient s/p cardiac arrest
pt s/p cardiac arrest,
Cath procedure done on 9/14/17. Code blue on 9/21/17. Code stroke 9/22/17. Pt not on anticoagulation.

## 2017-10-03 LAB
ANION GAP SERPL CALC-SCNC: 21 MMOL/L — HIGH (ref 5–17)
BUN SERPL-MCNC: 52 MG/DL — HIGH (ref 7–23)
CALCIUM SERPL-MCNC: 8.8 MG/DL — SIGNIFICANT CHANGE UP (ref 8.4–10.5)
CHLORIDE SERPL-SCNC: 94 MMOL/L — LOW (ref 96–108)
CO2 SERPL-SCNC: 24 MMOL/L — SIGNIFICANT CHANGE UP (ref 22–31)
CREAT SERPL-MCNC: 4.75 MG/DL — HIGH (ref 0.5–1.3)
GLUCOSE SERPL-MCNC: 125 MG/DL — HIGH (ref 70–99)
HCT VFR BLD CALC: 30 % — LOW (ref 39–50)
HGB BLD-MCNC: 9.8 G/DL — LOW (ref 13–17)
INR BLD: 4.16 RATIO — HIGH (ref 0.88–1.16)
MAGNESIUM SERPL-MCNC: 2.1 MG/DL — SIGNIFICANT CHANGE UP (ref 1.6–2.6)
MCHC RBC-ENTMCNC: 30.4 PG — SIGNIFICANT CHANGE UP (ref 27–34)
MCHC RBC-ENTMCNC: 32.8 GM/DL — SIGNIFICANT CHANGE UP (ref 32–36)
MCV RBC AUTO: 92.4 FL — SIGNIFICANT CHANGE UP (ref 80–100)
PHOSPHATE SERPL-MCNC: 4.7 MG/DL — HIGH (ref 2.5–4.5)
PLATELET # BLD AUTO: 211 K/UL — SIGNIFICANT CHANGE UP (ref 150–400)
POTASSIUM SERPL-MCNC: 4.7 MMOL/L — SIGNIFICANT CHANGE UP (ref 3.5–5.3)
POTASSIUM SERPL-SCNC: 4.7 MMOL/L — SIGNIFICANT CHANGE UP (ref 3.5–5.3)
PROTHROM AB SERPL-ACNC: 46.7 SEC — HIGH (ref 9.8–12.7)
RBC # BLD: 3.24 M/UL — LOW (ref 4.2–5.8)
RBC # FLD: 14.5 % — SIGNIFICANT CHANGE UP (ref 10.3–14.5)
SODIUM SERPL-SCNC: 139 MMOL/L — SIGNIFICANT CHANGE UP (ref 135–145)
WBC # BLD: 10.6 K/UL — HIGH (ref 3.8–10.5)
WBC # FLD AUTO: 10.6 K/UL — HIGH (ref 3.8–10.5)

## 2017-10-03 PROCEDURE — 99232 SBSQ HOSP IP/OBS MODERATE 35: CPT

## 2017-10-03 PROCEDURE — 99233 SBSQ HOSP IP/OBS HIGH 50: CPT

## 2017-10-03 PROCEDURE — 99497 ADVNCD CARE PLAN 30 MIN: CPT | Mod: 25

## 2017-10-03 PROCEDURE — 99232 SBSQ HOSP IP/OBS MODERATE 35: CPT | Mod: GC

## 2017-10-03 RX ORDER — ERYTHROPOIETIN 10000 [IU]/ML
10000 INJECTION, SOLUTION INTRAVENOUS; SUBCUTANEOUS EVERY OTHER DAY
Qty: 0 | Refills: 0 | Status: DISCONTINUED | OUTPATIENT
Start: 2017-10-03 | End: 2017-10-17

## 2017-10-03 RX ADMIN — CLOPIDOGREL BISULFATE 75 MILLIGRAM(S): 75 TABLET, FILM COATED ORAL at 12:42

## 2017-10-03 RX ADMIN — Medication 81 MILLIGRAM(S): at 12:43

## 2017-10-03 RX ADMIN — TAMSULOSIN HYDROCHLORIDE 0.4 MILLIGRAM(S): 0.4 CAPSULE ORAL at 21:32

## 2017-10-03 RX ADMIN — AMIODARONE HYDROCHLORIDE 200 MILLIGRAM(S): 400 TABLET ORAL at 05:24

## 2017-10-03 RX ADMIN — PANTOPRAZOLE SODIUM 40 MILLIGRAM(S): 20 TABLET, DELAYED RELEASE ORAL at 12:42

## 2017-10-03 RX ADMIN — Medication 5 MILLIGRAM(S): at 17:40

## 2017-10-03 RX ADMIN — Medication 25 MILLIGRAM(S): at 05:25

## 2017-10-03 RX ADMIN — Medication 25 MILLIGRAM(S): at 17:40

## 2017-10-03 RX ADMIN — SEVELAMER CARBONATE 800 MILLIGRAM(S): 2400 POWDER, FOR SUSPENSION ORAL at 09:55

## 2017-10-03 RX ADMIN — SEVELAMER CARBONATE 800 MILLIGRAM(S): 2400 POWDER, FOR SUSPENSION ORAL at 12:43

## 2017-10-03 RX ADMIN — ERYTHROPOIETIN 10000 UNIT(S): 10000 INJECTION, SOLUTION INTRAVENOUS; SUBCUTANEOUS at 00:20

## 2017-10-03 RX ADMIN — SEVELAMER CARBONATE 800 MILLIGRAM(S): 2400 POWDER, FOR SUSPENSION ORAL at 17:40

## 2017-10-03 RX ADMIN — Medication 5 MILLIGRAM(S): at 05:24

## 2017-10-03 RX ADMIN — ATORVASTATIN CALCIUM 40 MILLIGRAM(S): 80 TABLET, FILM COATED ORAL at 21:32

## 2017-10-03 NOTE — PROGRESS NOTE ADULT - SUBJECTIVE AND OBJECTIVE BOX
No distress, non-verbal post CVA    Vital Signs Last 24 Hrs  T(C): 37 (10-03-17 @ 13:03), Max: 37 (10-02-17 @ 20:15)  T(F): 98.6 (10-03-17 @ 13:03), Max: 98.6 (10-02-17 @ 20:15)  HR: 90 (10-03-17 @ 13:03) (75 - 126)  BP: 124/78 (10-03-17 @ 13:03) (109/62 - 127/76)  BP(mean): --  RR: 18 (10-03-17 @ 13:03) (17 - 18)  SpO2: 99% (10-03-17 @ 13:03) (98% - 100%)                                   9.8    10.6  )-----------( 211      ( 03 Oct 2017 04:52 )             30.0     03 Oct 2017 04:50    139    |  94     |  52     ----------------------------<  125    4.7     |  24     |  4.75     Ca    8.8        03 Oct 2017 04:50  Phos  4.7       03 Oct 2017 04:50  Mg     2.1       03 Oct 2017 04:50    General: NAD  Respiratory: good air entry  Cardiovascular: S1 S2   Gastrointestinal: soft, ND, BS present  Extremities:  no edema    amiodarone    Tablet 200 milliGRAM(s) Oral daily  aspirin  chewable 81 milliGRAM(s) Oral daily  atorvastatin 40 milliGRAM(s) Oral at bedtime  busPIRone 5 milliGRAM(s) Oral two times a day  clopidogrel Tablet 75 milliGRAM(s) Oral daily  dextrose 5%. 1000 milliLiter(s) IV Continuous <Continuous>  dextrose 50% Injectable 12.5 Gram(s) IV Push once  dextrose 50% Injectable 25 Gram(s) IV Push once  dextrose 50% Injectable 25 Gram(s) IV Push once  dextrose Gel 1 Dose(s) Oral once PRN  epoetin georgie Injectable 79470 Unit(s) IV Push every other day  glucagon  Injectable 1 milliGRAM(s) IntraMuscular once PRN  insulin lispro (HumaLOG) corrective regimen sliding scale   SubCutaneous three times a day before meals  insulin lispro (HumaLOG) corrective regimen sliding scale   SubCutaneous at bedtime  metoprolol 25 milliGRAM(s) Enteral Tube two times a day  pantoprazole  Injectable 40 milliGRAM(s) IV Push daily  sevelamer hydrochloride 800 milliGRAM(s) Oral three times a day with meals  tamsulosin 0.4 milliGRAM(s) Oral at bedtime        Assessment and Plan:   		  CM, NSTEMI, s/p cath, PCI  S/p arrest, intubation  PAfib, s/p CVA  S/p LIN on CKD in setting of non-fx L kidney and R hydro due to stone plus hemodynamic/dye injury  [facility]  Per most recent CT A/P passed stone and hydro resolved  No signs of renal fx recovery so far, PVR very small  K 6.2 yesterday, s/p HD yesterday for hyperkalemia  Will continue HD via temporary RIJ HD cath MWF w/fluid removal as able  Procrit w/HD  Renal diet  While most likely pt will require long term dialysis, at this point would avoid ACE/ARB or other nephrotoxins  Plans for perm HD access pending d/w family/HCP re: goals of care

## 2017-10-03 NOTE — PROGRESS NOTE ADULT - PROBLEM SELECTOR PLAN 2
S/p HD. Per meeting today with daughter, would want to pursue HD. Patient will need long term access. Discussed with daughter. S/p HD. Per meeting today with daughter, would want to pursue HD. Patient will need long term access. Discussed with daughter who would like to proceed. Patient's primary nephrologist also weighed in and in agreement with plan.

## 2017-10-03 NOTE — PROGRESS NOTE ADULT - ATTENDING COMMENTS
Agree with R1/R2 resident plan of care.  72 M with h/o CAD, T2DM, CKD, prior CVA transferred from OSH for NSTEMI s/p Impella-assisted LHC w/ BABAR x5 and obstructive nephropathy requiring HD transferred from CCU after PEA/Vtach arrest with ROSC c/b new embolic CVA.  - Calm, afebrile, no acute event on Tele, Vitals stable. No distress.  - Had HD for hyperkalemia/acidosis yesterday. Palliative care will speak to family today. If decision is made no to have HD, will ask Hospice consult.  - Spoke to Renal, will not start ACE I now. If family agrres with HD, will arrange permcath and d/c to Rehab.   - c/w ASA, Plavix, BB, statin and amiodarone, Held AC for elevated INR. f/u PT/INR daily   He is DNR .

## 2017-10-03 NOTE — PROGRESS NOTE ADULT - SUBJECTIVE AND OBJECTIVE BOX
Patient is a 72y old  Male who presents with a chief complaint of s/p cardiac cath (22 Sep 2017 12:34)      SUBJECTIVE / OVERNIGHT EVENTS: Yesterday evening, patient noted to have elevated K to 6.2, taken to HD. Episode of Afib 90s-120s, converted on his own to NSR, remains in NSR this morning. He remains non-verbal, however alert and following some commands.    MEDICATIONS  (STANDING):  amiodarone    Tablet 200 milliGRAM(s) Oral daily  aspirin  chewable 81 milliGRAM(s) Oral daily  atorvastatin 40 milliGRAM(s) Oral at bedtime  busPIRone 5 milliGRAM(s) Oral two times a day  clopidogrel Tablet 75 milliGRAM(s) Oral daily  dextrose 5%. 1000 milliLiter(s) (50 mL/Hr) IV Continuous <Continuous>  dextrose 50% Injectable 12.5 Gram(s) IV Push once  dextrose 50% Injectable 25 Gram(s) IV Push once  dextrose 50% Injectable 25 Gram(s) IV Push once  epoetin georgie Injectable 70408 Unit(s) IV Push <User Schedule>  insulin lispro (HumaLOG) corrective regimen sliding scale   SubCutaneous three times a day before meals  insulin lispro (HumaLOG) corrective regimen sliding scale   SubCutaneous at bedtime  metoprolol 25 milliGRAM(s) Enteral Tube two times a day  pantoprazole  Injectable 40 milliGRAM(s) IV Push daily  sevelamer hydrochloride 800 milliGRAM(s) Oral three times a day with meals  tamsulosin 0.4 milliGRAM(s) Oral at bedtime    MEDICATIONS  (PRN):  dextrose Gel 1 Dose(s) Oral once PRN Blood Glucose LESS THAN 70 milliGRAM(s)/deciLiter  glucagon  Injectable 1 milliGRAM(s) IntraMuscular once PRN Glucose <70 milliGRAM(s)/deciLiter    CAPILLARY BLOOD GLUCOSE  160 (02 Oct 2017 21:16)  206 (02 Oct 2017 17:58)  211 (02 Oct 2017 12:36)  168 (02 Oct 2017 08:30)    I&O's Summary    02 Oct 2017 07:01  -  03 Oct 2017 07:00  --------------------------------------------------------  IN: 770 mL / OUT: 1100 mL / NET: -330 mL    PHYSICAL EXAM:  GENERAL: NAD, well-developed  HEAD:  Atraumatic, Normocephalic  EYES: EOMI, PERRLA, conjunctiva and sclera clear  NECK: Supple, No JVD  CHEST/LUNG: Clear to auscultation bilaterally; No wheeze  HEART: Regular rate and rhythm; No murmurs, rubs, or gallops  ABDOMEN: Soft, Nontender, Nondistended; Bowel sounds present  EXTREMITIES:  2+ Peripheral Pulses, No clubbing, cyanosis, or edema  PSYCH: AAOx3  NEUROLOGY: non-focal  SKIN: No rashes or lesions    LABS:                        9.8    10.6  )-----------( 211      ( 03 Oct 2017 04:52 )             30.0     10-03    139  |  94<L>  |  52<H>  ----------------------------<  125<H>  4.7   |  24  |  4.75<H>    Ca    8.8      03 Oct 2017 04:50  Phos  4.7     10-03  Mg     2.1     10-03    PT/INR - ( 03 Oct 2017 04:52 )   PT: 46.7 sec;   INR: 4.16 ratio      RADIOLOGY & ADDITIONAL TESTS:    Imaging Personally Reviewed:    Consultant(s) Notes Reviewed:      Care Discussed with Consultants/Other Providers: Patient is a 72y old  Male who presents with a chief complaint of s/p cardiac cath (22 Sep 2017 12:34)      SUBJECTIVE / OVERNIGHT EVENTS: Yesterday evening, patient noted to have elevated K to 6.2, taken to HD. Episode of Afib 90s-120s, converted on his own to NSR, remains in NSR this morning. He remains non-verbal, however alert and following some commands.    MEDICATIONS  (STANDING):  amiodarone    Tablet 200 milliGRAM(s) Oral daily  aspirin  chewable 81 milliGRAM(s) Oral daily  atorvastatin 40 milliGRAM(s) Oral at bedtime  busPIRone 5 milliGRAM(s) Oral two times a day  clopidogrel Tablet 75 milliGRAM(s) Oral daily  dextrose 5%. 1000 milliLiter(s) (50 mL/Hr) IV Continuous <Continuous>  dextrose 50% Injectable 12.5 Gram(s) IV Push once  dextrose 50% Injectable 25 Gram(s) IV Push once  dextrose 50% Injectable 25 Gram(s) IV Push once  epoetin georgie Injectable 62462 Unit(s) IV Push <User Schedule>  insulin lispro (HumaLOG) corrective regimen sliding scale   SubCutaneous three times a day before meals  insulin lispro (HumaLOG) corrective regimen sliding scale   SubCutaneous at bedtime  metoprolol 25 milliGRAM(s) Enteral Tube two times a day  pantoprazole  Injectable 40 milliGRAM(s) IV Push daily  sevelamer hydrochloride 800 milliGRAM(s) Oral three times a day with meals  tamsulosin 0.4 milliGRAM(s) Oral at bedtime    MEDICATIONS  (PRN):  dextrose Gel 1 Dose(s) Oral once PRN Blood Glucose LESS THAN 70 milliGRAM(s)/deciLiter  glucagon  Injectable 1 milliGRAM(s) IntraMuscular once PRN Glucose <70 milliGRAM(s)/deciLiter    CAPILLARY BLOOD GLUCOSE  160 (02 Oct 2017 21:16)  206 (02 Oct 2017 17:58)  211 (02 Oct 2017 12:36)  168 (02 Oct 2017 08:30)    I&O's Summary    02 Oct 2017 07:01  -  03 Oct 2017 07:00  --------------------------------------------------------  IN: 770 mL / OUT: 1100 mL / NET: -330 mL    PHYSICAL EXAM:  GENERAL: NAD, well-developed  HEAD:  Atraumatic, Normocephalic  EYES: EOMI, PERRLA, conjunctiva and sclera clear  NECK: Supple, No JVD  CHEST/LUNG: Clear to auscultation bilaterally; No wheeze  HEART: Regular rate and rhythm; No murmurs, rubs, or gallops  ABDOMEN: Soft, Nontender, Nondistended; Bowel sounds present  EXTREMITIES:  2+ Peripheral Pulses, No clubbing, cyanosis, or edema  PSYCH: awake and alert, following some commands  NEUROLOGY: right-sided neglect/hemiparesis  SKIN: No rashes or lesions    LABS:                        9.8    10.6  )-----------( 211      ( 03 Oct 2017 04:52 )             30.0     10-03    139  |  94<L>  |  52<H>  ----------------------------<  125<H>  4.7   |  24  |  4.75<H>    Ca    8.8      03 Oct 2017 04:50  Phos  4.7     10-03  Mg     2.1     10-03    PT/INR - ( 03 Oct 2017 04:52 )   PT: 46.7 sec;   INR: 4.16 ratio      RADIOLOGY & ADDITIONAL TESTS:    Imaging Personally Reviewed:    Consultant(s) Notes Reviewed:      Care Discussed with Consultants/Other Providers:

## 2017-10-03 NOTE — PROGRESS NOTE ADULT - PROBLEM SELECTOR PLAN 3
Patient with multiple c-morbidities, Poor baseline s/p CVA. Needs assistance with all ADLs. Needs support.

## 2017-10-03 NOTE — PROGRESS NOTE ADULT - PROBLEM SELECTOR PLAN 2
patient with CTH (9/23) demonstrating multifocal infarcts concerning for embolic strokes  - c/w statin, coumadin on hold for supratherapeutic INR  - S&S assessment - honey nectar consistency  - PT/OT/Neuro following

## 2017-10-03 NOTE — PROGRESS NOTE ADULT - ATTENDING COMMENTS
72 year old man complicated course including ventricular tachycardia cardiac arrest, NSTEMI, Impella assisted PCI, CVA. Electrophysiology consideration for ICD, but not anticipated at this time. Patient is non-verbal, appears to be tolerating hemodialysis without evident chest pain and maintaining sinus rhythm after Amiodarone loading. On optimal medical regimen.

## 2017-10-03 NOTE — PROGRESS NOTE ADULT - PROBLEM SELECTOR PLAN 5
Patient initially presented with NSTEMI, s/p Impella-assisted Cath with BABAR x5 to LAD/RCA  - c/w DAPT, statin, and beta-blocker  - holding ACEI given LIN/CKD, per renal, no plan to start ACE/ARB  - cards following

## 2017-10-03 NOTE — PROGRESS NOTE ADULT - PROBLEM SELECTOR PLAN 7
Patient with anemia, likely anemia of chronic disease in setting of ESRD and low TIBC  - no overt bleeding at this time, FOBT negative, monitor for bleeding on AC  - trend H/H  - c/w EPO per renal

## 2017-10-03 NOTE — PROGRESS NOTE ADULT - ASSESSMENT
73 yo M w/PMH of extensive CAD, T2DM, CKD, prior CVA transferred from OSH for NSTEMI s/p Impella-assisted LHC w/BABAR x 5 and obstructive nephropathy requiring HD transferred from CCU after PEA/Vtach arrest with ROSC c/b new embolic CVA.

## 2017-10-03 NOTE — PROGRESS NOTE ADULT - PROBLEM SELECTOR PLAN 1
Multifocal infarcts concerning for embolic strokes. Currently bedbound, non-verbal, does not follow commands. S/p S&S assessment - honey nectar consistency. High risk of aspiration. PT/OT/Neuro following. Per daughter, patient would not want a feeding tube.

## 2017-10-03 NOTE — PROGRESS NOTE ADULT - PROBLEM SELECTOR PLAN 4
Patient is DNR/DNI. Surrogate is daughter Maggie Lara. After meeting over the phone with daughter, confirmation of DNR/DNI, no feeding tubes and continue with HD. After d/c daughter would want patient to go to rehab. Patient is DNR/DNI. Surrogate is daughter Maggie Lara. After discussion with daughter, confirmation of DNR/DNI, no feeding tubes and with to continue with HD. After d/c daughter would want patient to go to rehab.

## 2017-10-03 NOTE — PROGRESS NOTE ADULT - PROBLEM SELECTOR PLAN 1
patient with new ESRD complicated by hyperkalemia yesterday, now resolved s/p HD  - will need to transition to Permacath for HD access, pending further goals of care discussions with family  - avoid nephrotoxins, renally dose meds  - c/w HD per nephrology

## 2017-10-03 NOTE — PROGRESS NOTE ADULT - ATTENDING COMMENTS
I personally spent 30 minutes with advance care planning as per above. Patient's daughter who is surrorgate decision maker wishes to proceed with rehab and long term HD. Goals of care delineated. Please reconsult as needed.

## 2017-10-03 NOTE — PROGRESS NOTE ADULT - SUBJECTIVE AND OBJECTIVE BOX
INTERVAL HPI/OVERNIGHT EVENTS:    Patient seen and examined at bedside, appears very sleepy. Opens eyes with verbal stimuli for brief moment then goes back to sleep. Had HD yesterday for hyperK. Discussion with daughter over the phone today. See [facility] note.    Allergies    No Known Allergies    Intolerances    ADVANCE DIRECTIVES:    DNR Yes  Yes    MOLST [ ] YES [x ] NO --> will fill out.    MEDICATIONS  (STANDING):  amiodarone    Tablet 200 milliGRAM(s) Oral daily  aspirin  chewable 81 milliGRAM(s) Oral daily  atorvastatin 40 milliGRAM(s) Oral at bedtime  busPIRone 5 milliGRAM(s) Oral two times a day  clopidogrel Tablet 75 milliGRAM(s) Oral daily  dextrose 5%. 1000 milliLiter(s) (50 mL/Hr) IV Continuous <Continuous>  dextrose 50% Injectable 12.5 Gram(s) IV Push once  dextrose 50% Injectable 25 Gram(s) IV Push once  dextrose 50% Injectable 25 Gram(s) IV Push once  epoetin georgie Injectable 62060 Unit(s) IV Push every other day  insulin lispro (HumaLOG) corrective regimen sliding scale   SubCutaneous three times a day before meals  insulin lispro (HumaLOG) corrective regimen sliding scale   SubCutaneous at bedtime  metoprolol 25 milliGRAM(s) Enteral Tube two times a day  pantoprazole  Injectable 40 milliGRAM(s) IV Push daily  sevelamer hydrochloride 800 milliGRAM(s) Oral three times a day with meals  tamsulosin 0.4 milliGRAM(s) Oral at bedtime    MEDICATIONS  (PRN):  dextrose Gel 1 Dose(s) Oral once PRN Blood Glucose LESS THAN 70 milliGRAM(s)/deciLiter  glucagon  Injectable 1 milliGRAM(s) IntraMuscular once PRN Glucose <70 milliGRAM(s)/deciLiter    PRESENT SYMPTOMS:  Source: [ ] Patient   [ ] Family   [x ] Team     Pain:                        [x ] No [ ] Yes             [ ] Mild [ ] Moderate [ ] Severe    Onset -  Location -  Duration -  Character -  Alleviating/Aggravating -  Radiation -  Timing -    Dyspnea:                [x ] No [ ] Yes             [ ] Mild [ ] Moderate [ ] Severe    Anxiety:                  [x ] No [ ] Yes             [ ] Mild [ ] Moderate [ ] Severe    Fatigue:                  [x ] No [ ] Yes             [ ] Mild [ ] Moderate [ ] Severe    Nausea:                  [ x] No [ ] Yes             [ ] Mild [ ] Moderate [ ] Severe    Loss of appetite:   [x ] No [ ] Yes             [ ] Mild [ ] Moderate [ ] Severe    Constipation:        [x ] No [ ] Yes             [ ] Mild [ ] Moderate [ ] Severe    Other Symptoms:  [ ] All other review of systems negative   [ x] Unable to obtain due to poor mentation     Karnofsky Performance Score/Palliative Performance Status Version 2:  30%    PHYSICAL EXAM:  Vital Signs Last 24 Hrs  T(C): 37 (03 Oct 2017 13:03), Max: 37 (02 Oct 2017 20:15)  T(F): 98.6 (03 Oct 2017 13:03), Max: 98.6 (02 Oct 2017 20:15)  HR: 90 (03 Oct 2017 13:03) (75 - 126)  BP: 124/78 (03 Oct 2017 13:03) (109/62 - 124/78)  BP(mean): --  RR: 18 (03 Oct 2017 13:03) (17 - 18)  SpO2: 99% (03 Oct 2017 13:03) (98% - 100%) I&O's Summary    02 Oct 2017 07:01  -  03 Oct 2017 07:00  --------------------------------------------------------  IN: 770 mL / OUT: 1100 mL / NET: -330 mL    03 Oct 2017 07:01  -  03 Oct 2017 16:40  --------------------------------------------------------  IN: 100 mL / OUT: 0 mL / NET: 100 mL    General:  [ ] Alert  [ ] Oriented x      [x ] Lethargic  [ ] Agitated   [ ] Cachexia   [ ] Unarousable  [ ] Verbal  [x ] Non-Verbal    HEENT:  [ ] Normal   [x ] Dry mouth   [ ] ET Tube    [ ] Trach  [ ] Oral lesions    Lungs:   [x ] Clear [ ] Tachypnea  [ ] Audible excessive secretions   [ ] Rhonchi        [ ] Right [ ] Left [ ] Bilateral  [ ] Crackles        [ ] Right [ ] Left [ ] Bilateral  [ ] Wheezing     [ ] Right [ ] Left [ ] Bilateral    Cardiovascular:  [x ] Regular [ ] Irregular [ ] Tachycardia   [ ] Bradycardia  [ ] Murmur [ ] Other    Abdomen: [x ] Soft  [ ] Distended   [ ] +BS  [ ] Non tender [ ] Tender  [ ]PEG   [ ]OGT/ NGT   Last BM:       Genitourinary: [ ] Normal [ ] Incontinent   [x ] Oliguria/Anuria   [ ] Landa    Musculoskeletal:  [ ] Normal   [ x] Weakness  [ ] Bedbound/Wheelchair bound [ ] Edema    Neurological: [ ] No focal deficits  [ x] Cognitive impairment  [ ] Dysphagia [ ] Dysarthria [ ] Paresis [ ] Other     Skin: [ ] Normal   [ ] Pressure ulcer(s)                  [ ] Rash    LABS:                        9.8    10.6  )-----------( 211      ( 03 Oct 2017 04:52 )             30.0     10-03    139  |  94<L>  |  52<H>  ----------------------------<  125<H>  4.7   |  24  |  4.75<H>    Ca    8.8      03 Oct 2017 04:50  Phos  4.7     10-03  Mg     2.1     10-03    PT/INR - ( 03 Oct 2017 04:52 )   PT: 46.7 sec;   INR: 4.16 ratio      Shock: No [ ] Septic [ ] Cardiogenic [ ] Neurologic [ ] Hypovolemic  Vasopressors x None   Inotrophs x None    Oral Intake: [ ] Unable/mouth care only [ ] Minimal [x ] Moderate [ ] Full Capability    Diet: [ ] NPO [ ] Tube feeds [ ] TPN [x ] Other     RADIOLOGY & ADDITIONAL STUDIES: Reviewed.    REFERRALS:   [ ] Chaplaincy  [ ] Hospice  [ ] Child Life  [ ] Social Work  [ ] Case management [ ] Holistic Therapy

## 2017-10-03 NOTE — PROGRESS NOTE ADULT - ASSESSMENT
This is 73 yo male  who is Cymraes speaking male with PMH CAD s/p 12-14  stents placed [id number], T2 DM. 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 Transfered to [facility] from [facility] for cardiac angiogram. Found to have multifocal infarcts likely embolic strokes, Afib on AC, dysphagia S&S - honey nectar thickened, NSTEMI, ESRD dependent on HD, CHF with worsening systolic function s/p NSTEMI.

## 2017-10-03 NOTE — PROGRESS NOTE ADULT - SUBJECTIVE AND OBJECTIVE BOX
Interval History: Remains non-verbal but following commands. Yesterday pt required dialysis for elevated K, went into afib and later in the evening converted back to NSR.     Review Of Systems:  Constitutional: [ ] Fever [ ] Chills [ ] Fatigue [ ] Weight change   HEENT: [ ] Blurred vision [ ] Eye Pain [ ] Headache [ ] Runny nose [ ] Sore Throat   Respiratory: [ ] Cough [ ] Wheezing [ ] Shortness of breath  Cardiovascular: [ ] Chest Pain [ ] Palpitations [ ] CHAMPION [ ] PND [ ] Orthopnea  Gastrointestinal: [ ] Abdominal Pain [ ] Diarrhea [ ] Constipation [ ] Hemorrhoids [ ] Nausea [ ] Vomiting  Genitourinary: [ ] Nocturia [ ] Dysuria [ ] Incontinence  Extremities: [ ] Swelling [ ] Joint Pain  Neurologic: [ ] Focal deficit [ ] Paresthesias [ ] Syncope  Lymphatic: [ ] Swelling [ ] Lymphadenopathy   Skin: [ ] Rash [ ] Ecchymoses [ ] Wounds [ ] Lesions  Psychiatry: [ ] Depression [ ] Suicidal/Homicidal Ideation [ ] Anxiety [ ] Sleep Disturbances  [ ] 10 point review of systems is otherwise negative except as mentioned above            [x ]Unable to obtain    Medications:  amiodarone    Tablet 200 milliGRAM(s) Oral daily  aspirin  chewable 81 milliGRAM(s) Oral daily  atorvastatin 40 milliGRAM(s) Oral at bedtime  busPIRone 5 milliGRAM(s) Oral two times a day  clopidogrel Tablet 75 milliGRAM(s) Oral daily  dextrose 5%. 1000 milliLiter(s) IV Continuous <Continuous>  dextrose 50% Injectable 12.5 Gram(s) IV Push once  dextrose 50% Injectable 25 Gram(s) IV Push once  dextrose 50% Injectable 25 Gram(s) IV Push once  dextrose Gel 1 Dose(s) Oral once PRN  epoetin georgie Injectable 87248 Unit(s) IV Push <User Schedule>  glucagon  Injectable 1 milliGRAM(s) IntraMuscular once PRN  insulin lispro (HumaLOG) corrective regimen sliding scale   SubCutaneous three times a day before meals  insulin lispro (HumaLOG) corrective regimen sliding scale   SubCutaneous at bedtime  metoprolol 25 milliGRAM(s) Enteral Tube two times a day  pantoprazole  Injectable 40 milliGRAM(s) IV Push daily  sevelamer hydrochloride 800 milliGRAM(s) Oral three times a day with meals  tamsulosin 0.4 milliGRAM(s) Oral at bedtime      Vitals:  T(C): 36.9 (10-03-17 @ 04:41), Max: 37 (10-02-17 @ 20:15)  HR: 96 (10-03-17 @ 04:41) (75 - 126)  BP: 112/71 (10-03-17 @ 04:41) (109/62 - 127/76)  BP(mean): --  RR: 18 (10-03-17 @ 04:41) (17 - 18)  SpO2: 99% (10-03-17 @ 04:41) (97% - 100%)  Wt(kg): --  Daily     Daily Weight in k.3 (03 Oct 2017 04:41)  I&O's Summary    02 Oct 2017 07:01  -  03 Oct 2017 07:00  --------------------------------------------------------  IN: 770 mL / OUT: 1100 mL / NET: -330 mL    03 Oct 2017 07:01  -  03 Oct 2017 11:26  --------------------------------------------------------  IN: 100 mL / OUT: 0 mL / NET: 100 mL        Physical Exam:  Appearance: NAD  Eyes: PERRL, EOMI  HENT: Normal oral [place] NC/AT  Cardiovascular: S1, S2, RRR, No m/r/g appreciated, No edema, no elevation in JVP  Respiratory: examined anteriorly - mild rhonchi present  Gastrointestinal: Soft, Non-tender, Non-distended, BS+  Musculoskeletal:  No clubbing, No joint deformity   Neurologic: Non-focal  Lymphatic: No lymphadenopathy  Psychiatry: nonverbal  Skin: scattered ecchymosis     Labs:                        9.8    10.6  )-----------( 211      ( 03 Oct 2017 04:52 )             30.0     10-03    139  |  94<L>  |  52<H>  ----------------------------<  125<H>  4.7   |  24  |  4.75<H>    Ca    8.8      03 Oct 2017 04:50  Phos  4.7     10-03  Mg     2.1     10-03      PT/INR - ( 03 Oct 2017 04:52 )   PT: 46.7 sec;   INR: 4.16 ratio        Interpretation of Telemetry: afib 100-130 converted to sinus around 2:30AM

## 2017-10-03 NOTE — PROGRESS NOTE ADULT - PROBLEM SELECTOR PLAN 3
Patient with new-onset paroxysmal Afib/flutter, additional short episode of Afib overnight, converted to NSR  - c/w amiodarone 200 QD per EP  - daily INR checks, will dose coumadin as INR drops, on hold for now

## 2017-10-03 NOTE — PROGRESS NOTE ADULT - PROBLEM SELECTOR PLAN 6
Patient with worsening systolic dysfunction s/p NSTEMI/arrest  - per EP, daughter declined AICD and would like to pursue palliative  - c/w fluid removal by intermittent HD

## 2017-10-03 NOTE — PROGRESS NOTE ADULT - PROBLEM SELECTOR PLAN 4
Patient with new dysphagia in the setting of CVA  - S&S eval appreciated, honey nectar consistency diet  - tolerating diet, continue to monitor

## 2017-10-03 NOTE — GOALS OF CARE CONVERSATION - PERSONAL ADVANCE DIRECTIVE - CONVERSATION DETAILS
Spoke with the patient's daughter and HCP, Anisa Lara. We discussed the patient's diagnosis of NSTEMI with cardiac arrest, ESRD on HD, and new CVA. I updated her on patient's current treatment and that multiple teams are caring for the patient, including Cardiology, Nephrology, and Neurology. The daughter reports that she would like to make her father DNR at this time but declined DNI. She would like to currently proceed with all other medical care.    I will follow up to set up a palliative care meeting with her and other family.
Discussion with daughter in regards of patients current care. Per daughter, patient would not want to be intubated or resuscitated, would like to allow for natural death. Would like to continue with HD, it was explained to daughter that dialysis would be likely long term and that patient would require long term access. Daughter in agreement to continue HD and placing long term access. Would not want a feeding tube, will continue with thickened food, positioning and assisted feeding. Daughter would want patient to get discharged to rehab.

## 2017-10-03 NOTE — PROGRESS NOTE ADULT - PROBLEM SELECTOR PLAN 8
Patient with HbA1c of 6.5%  - FS ranging 160s-200s, given significant debility would not favor tighter control  - c/w HISS Ac/Hs

## 2017-10-03 NOTE — PROGRESS NOTE ADULT - ASSESSMENT
73 y/o M w/ a PMHx of extensive CAD, T2DM, ESRD new HD, prior CVA w/ multiple sites of infarcts who was transferred from OSH for NSTEMI s/p impella assisted LHC and pyelonephritis with course c/b obstructive nephropathy requiring HD transferred from floor to CCU after cardiac arrest, v-tach and subsequent ROSC w/ hospital course further c/b embolic CVA (9/23).     # GOC - discussion today between palliative and family at 2PM  - this will help guide further cardiac management, specifically, from an EP, HF and nephrology perspective    # NSTEMI c/b VT arrest s/p impella-assisted PCI with BABAR x 5 to LAD/RCA. Now stable  -cont. DAPT, lipitor 40mg QD, metoprolol 25mg PO BID  -cont holding ACE/ ARB. If plans for long term dialysis, recommendations will change.      # ICM (EF~20%)- NYHA 2-3, ACC/AHA C-currently euvolemic  - hold ACE/ARB and aldacton. If plans for long term dialysis, recommendations will change.  - c/w metoprolol at 25mg bid  - EP reccs regarding PPM apprecaited    # Atrial fibrillation/flutter -currently in NSR  -cont PO amio 200mg QD  -hold warfarin in the setting of supratheraputic INR.

## 2017-10-03 NOTE — PROGRESS NOTE ADULT - PROBLEM SELECTOR PLAN 10
- DVT PPx: c/w coumadin as INR allows  - f/u palliative care c/s re: further GOC discussions  - patient is DNR but not DNI per discussion with daughter

## 2017-10-04 DIAGNOSIS — Z71.89 OTHER SPECIFIED COUNSELING: ICD-10-CM

## 2017-10-04 LAB
ANION GAP SERPL CALC-SCNC: 23 MMOL/L — HIGH (ref 5–17)
BUN SERPL-MCNC: 93 MG/DL — HIGH (ref 7–23)
CALCIUM SERPL-MCNC: 8.9 MG/DL — SIGNIFICANT CHANGE UP (ref 8.4–10.5)
CHLORIDE SERPL-SCNC: 94 MMOL/L — LOW (ref 96–108)
CO2 SERPL-SCNC: 21 MMOL/L — LOW (ref 22–31)
CREAT SERPL-MCNC: 8.29 MG/DL — HIGH (ref 0.5–1.3)
GLUCOSE SERPL-MCNC: 109 MG/DL — HIGH (ref 70–99)
HCT VFR BLD CALC: 27.5 % — LOW (ref 39–50)
HGB BLD-MCNC: 9 G/DL — LOW (ref 13–17)
INR BLD: 2.67 RATIO — HIGH (ref 0.88–1.16)
MAGNESIUM SERPL-MCNC: 2.6 MG/DL — SIGNIFICANT CHANGE UP (ref 1.6–2.6)
MCHC RBC-ENTMCNC: 30.2 PG — SIGNIFICANT CHANGE UP (ref 27–34)
MCHC RBC-ENTMCNC: 32.9 GM/DL — SIGNIFICANT CHANGE UP (ref 32–36)
MCV RBC AUTO: 91.8 FL — SIGNIFICANT CHANGE UP (ref 80–100)
PHOSPHATE SERPL-MCNC: 7.7 MG/DL — HIGH (ref 2.5–4.5)
PLATELET # BLD AUTO: 294 K/UL — SIGNIFICANT CHANGE UP (ref 150–400)
POTASSIUM SERPL-MCNC: 5.6 MMOL/L — HIGH (ref 3.5–5.3)
POTASSIUM SERPL-SCNC: 5.6 MMOL/L — HIGH (ref 3.5–5.3)
PROTHROM AB SERPL-ACNC: 29.4 SEC — HIGH (ref 9.8–12.7)
RBC # BLD: 3 M/UL — LOW (ref 4.2–5.8)
RBC # FLD: 14.4 % — SIGNIFICANT CHANGE UP (ref 10.3–14.5)
SODIUM SERPL-SCNC: 138 MMOL/L — SIGNIFICANT CHANGE UP (ref 135–145)
WBC # BLD: 9.1 K/UL — SIGNIFICANT CHANGE UP (ref 3.8–10.5)
WBC # FLD AUTO: 9.1 K/UL — SIGNIFICANT CHANGE UP (ref 3.8–10.5)

## 2017-10-04 PROCEDURE — 99232 SBSQ HOSP IP/OBS MODERATE 35: CPT | Mod: GC

## 2017-10-04 PROCEDURE — 93010 ELECTROCARDIOGRAM REPORT: CPT

## 2017-10-04 PROCEDURE — 99232 SBSQ HOSP IP/OBS MODERATE 35: CPT

## 2017-10-04 RX ORDER — SEVELAMER CARBONATE 2400 MG/1
1600 POWDER, FOR SUSPENSION ORAL
Qty: 0 | Refills: 0 | Status: DISCONTINUED | OUTPATIENT
Start: 2017-10-04 | End: 2017-10-06

## 2017-10-04 RX ADMIN — Medication 1: at 13:11

## 2017-10-04 RX ADMIN — CLOPIDOGREL BISULFATE 75 MILLIGRAM(S): 75 TABLET, FILM COATED ORAL at 09:26

## 2017-10-04 RX ADMIN — Medication 25 MILLIGRAM(S): at 05:50

## 2017-10-04 RX ADMIN — Medication 5 MILLIGRAM(S): at 18:10

## 2017-10-04 RX ADMIN — SEVELAMER CARBONATE 1600 MILLIGRAM(S): 2400 POWDER, FOR SUSPENSION ORAL at 13:11

## 2017-10-04 RX ADMIN — SEVELAMER CARBONATE 1600 MILLIGRAM(S): 2400 POWDER, FOR SUSPENSION ORAL at 18:10

## 2017-10-04 RX ADMIN — ATORVASTATIN CALCIUM 40 MILLIGRAM(S): 80 TABLET, FILM COATED ORAL at 21:28

## 2017-10-04 RX ADMIN — ERYTHROPOIETIN 10000 UNIT(S): 10000 INJECTION, SOLUTION INTRAVENOUS; SUBCUTANEOUS at 13:51

## 2017-10-04 RX ADMIN — Medication 81 MILLIGRAM(S): at 09:23

## 2017-10-04 RX ADMIN — Medication 5 MILLIGRAM(S): at 05:49

## 2017-10-04 RX ADMIN — SEVELAMER CARBONATE 800 MILLIGRAM(S): 2400 POWDER, FOR SUSPENSION ORAL at 09:23

## 2017-10-04 RX ADMIN — TAMSULOSIN HYDROCHLORIDE 0.4 MILLIGRAM(S): 0.4 CAPSULE ORAL at 21:44

## 2017-10-04 RX ADMIN — Medication 25 MILLIGRAM(S): at 18:10

## 2017-10-04 RX ADMIN — PANTOPRAZOLE SODIUM 40 MILLIGRAM(S): 20 TABLET, DELAYED RELEASE ORAL at 13:12

## 2017-10-04 RX ADMIN — AMIODARONE HYDROCHLORIDE 200 MILLIGRAM(S): 400 TABLET ORAL at 05:49

## 2017-10-04 NOTE — PROGRESS NOTE ADULT - SUBJECTIVE AND OBJECTIVE BOX
Patient is a 72y old  Male who presents with a chief complaint of s/p cardiac cath (22 Sep 2017 12:34)        SUBJECTIVE / OVERNIGHT EVENTS: Used  Services([facility] #[id number]). Pt remains nonverbal. Difficult to assess. Pending HD today. As per pall care conversation yesterday, family would to maintain dialysis and cont with DNR. Pt will require permacath.    MEDICATIONS  (STANDING):  amiodarone    Tablet 200 milliGRAM(s) Oral daily  aspirin  chewable 81 milliGRAM(s) Oral daily  atorvastatin 40 milliGRAM(s) Oral at bedtime  busPIRone 5 milliGRAM(s) Oral two times a day  clopidogrel Tablet 75 milliGRAM(s) Oral daily  dextrose 5%. 1000 milliLiter(s) (50 mL/Hr) IV Continuous <Continuous>  dextrose 50% Injectable 12.5 Gram(s) IV Push once  dextrose 50% Injectable 25 Gram(s) IV Push once  dextrose 50% Injectable 25 Gram(s) IV Push once  epoetin georgie Injectable 81476 Unit(s) IV Push every other day  insulin lispro (HumaLOG) corrective regimen sliding scale   SubCutaneous three times a day before meals  insulin lispro (HumaLOG) corrective regimen sliding scale   SubCutaneous at bedtime  metoprolol 25 milliGRAM(s) Enteral Tube two times a day  pantoprazole  Injectable 40 milliGRAM(s) IV Push daily  sevelamer hydrochloride 800 milliGRAM(s) Oral three times a day with meals  tamsulosin 0.4 milliGRAM(s) Oral at bedtime    MEDICATIONS  (PRN):  dextrose Gel 1 Dose(s) Oral once PRN Blood Glucose LESS THAN 70 milliGRAM(s)/deciLiter  glucagon  Injectable 1 milliGRAM(s) IntraMuscular once PRN Glucose <70 milliGRAM(s)/deciLiter      T(C): 36.7 (10-04-17 @ 04:35), Max: 37 (10-03-17 @ 13:03)  HR: 84 (10-04-17 @ 04:35) (81 - 90)  BP: 118/74 (10-04-17 @ 04:35) (114/73 - 124/78)  RR: 18 (10-04-17 @ 04:35) (17 - 18)  SpO2: 98% (10-04-17 @ 04:35) (97% - 99%)  CAPILLARY BLOOD GLUCOSE  144 (04 Oct 2017 08:45)  190 (03 Oct 2017 20:34)  126 (03 Oct 2017 17:43)  135 (03 Oct 2017 12:52)        I&O's Summary    03 Oct 2017 07:01  -  04 Oct 2017 07:00  --------------------------------------------------------  IN: 100 mL / OUT: 0 mL / NET: 100 mL        PHYSICAL EXAM  GENERAL: NAD, well-developed  HEAD:  Atraumatic, Normocephalic  EYES: EOMI, PERRLA, conjunctiva and sclera clear  NECK: Supple, No JVD  CHEST/LUNG: Clear to auscultation bilaterally; No wheeze  HEART: Regular rate and rhythm; No murmurs, rubs, or gallops  ABDOMEN: Soft, Nontender, Nondistended; Bowel sounds present, left groin hematoma  EXTREMITIES:  2+ Peripheral Pulses, No clubbing, cyanosis, or edema  PSYCH: non verbal, R hemiplegia.   SKIN: R thorax shiley site, non erythematous    LABS:                        9.0    9.1   )-----------( 294      ( 04 Oct 2017 06:58 )             27.5     10-04    138  |  94<L>  |  93<H>  ----------------------------<  109<H>  5.6<H>   |  21<L>  |  8.29<H>    Ca    8.9      04 Oct 2017 06:58  Phos  7.7     10-04  Mg     2.6     10-04      PT/INR - ( 04 Oct 2017 06:58 )   PT: 29.4 sec;   INR: 2.67 ratio           RADIOLOGY & ADDITIONAL TESTS:        Imaging Personally Reviewed:  Consultant(s) Notes Reviewed:    Care Discussed with Consultants/Other Providers:

## 2017-10-04 NOTE — PROGRESS NOTE ADULT - ASSESSMENT
73 y/o M w/ a PMHx of extensive CAD, T2DM, ESRD new HD, prior CVA w/ multiple sites of infarcts who was transferred from OSH for NSTEMI s/p impella assisted LHC and pyelonephritis with course c/b obstructive nephropathy requiring HD transferred from floor to CCU after cardiac arrest, v-tach and subsequent ROSC w/ hospital course further c/b embolic CVA (9/23). GOC discussion had and plans are to continue with dialysis and continue thick feeds    # NSTEMI c/b VT arrest s/p impella-assisted PCI with BABAR x 5 to LAD/RCA. Now stable  -cont. DAPT, lipitor 40mg QD, metoprolol 25mg PO BID      # ICM (EF~20%)- NYHA 2-3, ACC/AHA C-currently euvolemic  - hold ACE/ARB. If plans for long term dialysis and nephrology feels it is safe, we can introduce afterload reduction to help optimize his medical regimen. Will await nephrology's input on this.  - c/w metoprolol at 25mg bid  - EP to likely offer pacemaker. Reccs to follow.    # Atrial fibrillation/flutter -currently in NSR  -cont PO amio 200mg QD  -hold warfarin in the setting of supratheraputic INR.

## 2017-10-04 NOTE — PROGRESS NOTE ADULT - PROBLEM SELECTOR PLAN 1
patient with new ESRD complicated by hyperkalemia yesterday, now resolved s/p HD  - will need to transition to Permacath for HD access, as family would like to continue HD  - avoid nephrotoxins, renally dose meds  - c/w HD per nephrology

## 2017-10-04 NOTE — PROGRESS NOTE ADULT - SUBJECTIVE AND OBJECTIVE BOX
Seen on HD, non-verbal post CVA    Vital Signs Last 24 Hrs  T(C): 36.6 (10-04-17 @ 17:00), Max: 37.1 (10-04-17 @ 12:05)  T(F): 97.8 (10-04-17 @ 17:00), Max: 98.7 (10-04-17 @ 12:05)  HR: 99 (10-04-17 @ 17:00) (77 - 99)  BP: 110/67 (10-04-17 @ 17:00) (110/67 - 118/74)  BP(mean): --  RR: 18 (10-04-17 @ 17:00) (16 - 18)  SpO2: 99% (10-04-17 @ 17:00) (97% - 99%)                                   9.0    9.1   )-----------( 294      ( 04 Oct 2017 06:58 )             27.5     04 Oct 2017 06:58    138    |  94     |  93     ----------------------------<  109    5.6     |  21     |  8.29     Ca    8.9        04 Oct 2017 06:58  Phos  7.7       04 Oct 2017 06:58  Mg     2.6       04 Oct 2017 06:58    General: NAD  Respiratory: good air entry  Cardiovascular: S1 S2   Gastrointestinal: soft, ND, BS present  Extremities:  no edema    amiodarone    Tablet 200 milliGRAM(s) Oral daily  aspirin  chewable 81 milliGRAM(s) Oral daily  atorvastatin 40 milliGRAM(s) Oral at bedtime  busPIRone 5 milliGRAM(s) Oral two times a day  clopidogrel Tablet 75 milliGRAM(s) Oral daily  dextrose 5%. 1000 milliLiter(s) IV Continuous <Continuous>  dextrose 50% Injectable 12.5 Gram(s) IV Push once  dextrose 50% Injectable 25 Gram(s) IV Push once  dextrose 50% Injectable 25 Gram(s) IV Push once  dextrose Gel 1 Dose(s) Oral once PRN  epoetin georgie Injectable 18077 Unit(s) IV Push every other day  glucagon  Injectable 1 milliGRAM(s) IntraMuscular once PRN  insulin lispro (HumaLOG) corrective regimen sliding scale   SubCutaneous three times a day before meals  insulin lispro (HumaLOG) corrective regimen sliding scale   SubCutaneous at bedtime  metoprolol 25 milliGRAM(s) Enteral Tube two times a day  pantoprazole  Injectable 40 milliGRAM(s) IV Push daily  sevelamer hydrochloride 1600 milliGRAM(s) Oral three times a day with meals  tamsulosin 0.4 milliGRAM(s) Oral at bedtime      Assessment and Plan:   		  CM, NSTEMI, s/p cath, PCI  S/p arrest, intubation  PAfib, s/p CVA  S/p LIN on CKD in setting of non-fx L kidney and R hydro due to stone plus hemodynamic/dye injury  Started on HD in [facility]  Subsequently passed stone and hydro resolved  No signs of renal fx recovery so far, PVR very small  On HD MWF  Procrit w/HD  Renal diet  While most likely pt will require long term dialysis, at this point would avoid ACE/ARB or other nephrotoxins if possible  Needs perm cath for OP HD prior to d/c

## 2017-10-04 NOTE — PROGRESS NOTE ADULT - ASSESSMENT
71 yo M w/PMH of extensive CAD, T2DM, CKD, prior CVA transferred from OSH for NSTEMI s/p Impella-assisted LHC w/BABAR x 5 and obstructive nephropathy requiring HD transferred from CCU after PEA/Vtach arrest with ROSC c/b new embolic CVA.

## 2017-10-04 NOTE — PROGRESS NOTE ADULT - ATTENDING COMMENTS
Agree with R1/R2 resident plan of care.  72 M with h/o CAD, T2DM, CKD, prior CVA transferred from OSH for NSTEMI s/p Impella-assisted LHC w/ BABAR x5 and obstructive nephropathy requiring HD transferred from CCU after PEA/Vtach arrest with ROSC c/b new embolic CVA.  - No acute event on Tele, Calm, Vitals stable. No distress.  - Palliative care spoke to Daughter, she wants dialysis to continue. made DNR/DNI. Spoke to Dr Solis (Renal). Will arrange for permcath by IR., will not start ACE I now.   - c/w ASA, Plavix, BB, statin and amiodarone, Held AC for elevated INR. f/u PT/INR daily

## 2017-10-04 NOTE — PROGRESS NOTE ADULT - PROBLEM SELECTOR PLAN 5
Patient initially presented with NSTEMI, s/p Impella-assisted Cath with BABAR x5 to LAD/RCA  - c/w DAPT, statin, and beta-blocker  -  per renal, no plan to start ACE/ARB  - cards following

## 2017-10-04 NOTE — PROGRESS NOTE ADULT - ASSESSMENT
72yoM, hx of CAD (DESx18), CKDm DM2, COPD, CVA (remote, no deficits), Bipolar (not on meds). Presented with flank pain, developed NSTEMI, hypertensive emergency,  TWI. [facility] on 9/14 with BABAR to pRCA.  9/21 had cardiac arrest, tele shows bradycardia, evolving intop VF.  18 minutes CPR, intubated.   9/23 with new CVA dx, new aphasia, weakness. 9/27 EF 20%      VF arrest  - I spoke with the patients daughter , Ruth ([phone])   - She was agreeable towards pacemaker. She wants to wait at least one day however, wants it placed prior to DC.   - I will try to speak with her when she comes in the evening.   - EP to continue to follow  - please maintain K>4 , Mg>2  - For any questions or If there are any concerns, please call [phone] during daytime, covered by [id number] after-hours

## 2017-10-04 NOTE — PROGRESS NOTE ADULT - SUBJECTIVE AND OBJECTIVE BOX
Interval History: No acute issues overnight. Pt remains non-verbal.    Review Of Systems:  Constitutional: [ ] Fever [ ] Chills [ ] Fatigue [ ] Weight change   HEENT: [ ] Blurred vision [ ] Eye Pain [ ] Headache [ ] Runny nose [ ] Sore Throat   Respiratory: [ ] Cough [ ] Wheezing [ ] Shortness of breath  Cardiovascular: [ ] Chest Pain [ ] Palpitations [ ] CHAMPION [ ] PND [ ] Orthopnea  Gastrointestinal: [ ] Abdominal Pain [ ] Diarrhea [ ] Constipation [ ] Hemorrhoids [ ] Nausea [ ] Vomiting  Genitourinary: [ ] Nocturia [ ] Dysuria [ ] Incontinence  Extremities: [ ] Swelling [ ] Joint Pain  Neurologic: [ ] Focal deficit [ ] Paresthesias [ ] Syncope  Lymphatic: [ ] Swelling [ ] Lymphadenopathy   Skin: [ ] Rash [ ] Ecchymoses [ ] Wounds [ ] Lesions  Psychiatry: [ ] Depression [ ] Suicidal/Homicidal Ideation [ ] Anxiety [ ] Sleep Disturbances  [ ] 10 point review of systems is otherwise negative except as mentioned above            [x]Unable to obtain    Medications:  amiodarone    Tablet 200 milliGRAM(s) Oral daily  aspirin  chewable 81 milliGRAM(s) Oral daily  atorvastatin 40 milliGRAM(s) Oral at bedtime  busPIRone 5 milliGRAM(s) Oral two times a day  clopidogrel Tablet 75 milliGRAM(s) Oral daily  dextrose 5%. 1000 milliLiter(s) IV Continuous <Continuous>  dextrose 50% Injectable 12.5 Gram(s) IV Push once  dextrose 50% Injectable 25 Gram(s) IV Push once  dextrose 50% Injectable 25 Gram(s) IV Push once  dextrose Gel 1 Dose(s) Oral once PRN  epoetin georgie Injectable 94480 Unit(s) IV Push every other day  glucagon  Injectable 1 milliGRAM(s) IntraMuscular once PRN  insulin lispro (HumaLOG) corrective regimen sliding scale   SubCutaneous three times a day before meals  insulin lispro (HumaLOG) corrective regimen sliding scale   SubCutaneous at bedtime  metoprolol 25 milliGRAM(s) Enteral Tube two times a day  pantoprazole  Injectable 40 milliGRAM(s) IV Push daily  sevelamer hydrochloride 800 milliGRAM(s) Oral three times a day with meals  tamsulosin 0.4 milliGRAM(s) Oral at bedtime      Vitals:  T(C): 36.7 (10-04-17 @ 04:35), Max: 37 (10-03-17 @ 13:03)  HR: 84 (10-04-17 @ 04:35) (81 - 90)  BP: 118/74 (10-04-17 @ 04:35) (114/73 - 124/78)  BP(mean): --  RR: 18 (10-04-17 @ 04:35) (17 - 18)  SpO2: 98% (10-04-17 @ 04:35) (97% - 99%)  Wt(kg): --  Daily     Daily Weight in k (04 Oct 2017 04:35)  I&O's Summary    03 Oct 2017 07:  -  04 Oct 2017 07:00  --------------------------------------------------------  IN: 100 mL / OUT: 0 mL / NET: 100 mL    04 Oct 2017 07:01  -  04 Oct 2017 09:58  --------------------------------------------------------  IN: 240 mL / OUT: 0 mL / NET: 240 mL        Physical Exam:  Appearance: NAD; follows commands  Eyes: PERRL, EOMI  HENT: Normal oral muscosa NC/AT  Lymphatic: No lymphadenopathy  Cardiovascular: Normal S1 S2, No JVD, No murmurs, No edema  Respiratory: Lungs clear to auscultation	  Gastrointestinal:  Soft, Non-tender, + BS	  Skin: ecchymosis on left proximal anterior thigh which extends to LLQ/Left flank	  Neurologic: Nonverbal; able to move left arm and leg  Extremities: Normal range of motion, No clubbing, cyanosis or edema  Vascular: Peripheral pulses palpable 2+ bilaterally    Labs:                        9.0    9.1   )-----------( 294      ( 04 Oct 2017 06:58 )             27.5     10-04    138  |  94<L>  |  93<H>  ----------------------------<  109<H>  5.6<H>   |  21<L>  |  8.29<H>    Ca    8.9      04 Oct 2017 06:58  Phos  7.7     10-04  Mg     2.6     10-04      PT/INR - ( 04 Oct 2017 06:58 )   PT: 29.4 sec;   INR: 2.67 ratio        Interpretation of Telemetry: [id number]

## 2017-10-04 NOTE — PROGRESS NOTE ADULT - SUBJECTIVE AND OBJECTIVE BOX
24H hour events:  aphasic    MEDICATIONS:  amiodarone    Tablet 200 milliGRAM(s) Oral daily  aspirin  chewable 81 milliGRAM(s) Oral daily  clopidogrel Tablet 75 milliGRAM(s) Oral daily  metoprolol 25 milliGRAM(s) Enteral Tube two times a day  tamsulosin 0.4 milliGRAM(s) Oral at bedtime        busPIRone 5 milliGRAM(s) Oral two times a day    pantoprazole  Injectable 40 milliGRAM(s) IV Push daily    atorvastatin 40 milliGRAM(s) Oral at bedtime  dextrose 50% Injectable 12.5 Gram(s) IV Push once  dextrose 50% Injectable 25 Gram(s) IV Push once  dextrose 50% Injectable 25 Gram(s) IV Push once  dextrose Gel 1 Dose(s) Oral once PRN  glucagon  Injectable 1 milliGRAM(s) IntraMuscular once PRN  insulin lispro (HumaLOG) corrective regimen sliding scale   SubCutaneous three times a day before meals  insulin lispro (HumaLOG) corrective regimen sliding scale   SubCutaneous at bedtime    dextrose 5%. 1000 milliLiter(s) IV Continuous <Continuous>  epoetin georgie Injectable 66363 Unit(s) IV Push every other day        PHYSICAL EXAM:  T(C): 36.7 (10-04-17 @ 04:35), Max: 37 (10-03-17 @ 13:03)  HR: 84 (10-04-17 @ 04:35) (81 - 90)  BP: 118/74 (10-04-17 @ 04:35) (114/73 - 124/78)  RR: 18 (10-04-17 @ 04:35) (17 - 18)  SpO2: 98% (10-04-17 @ 04:35) (97% - 99%)  Wt(kg): --  I&O's Summary    03 Oct 2017 07:01  -  04 Oct 2017 07:00  --------------------------------------------------------  IN: 100 mL / OUT: 0 mL / NET: 100 mL    04 Oct 2017 07:01  -  04 Oct 2017 11:46  --------------------------------------------------------  IN: 240 mL / OUT: 0 mL / NET: 240 mL        Appearance: Normal	  HEENT:   Normal oral mucosa  Lymphatic: No lymphadenopathy  Cardiovascular: Normal S1 S2,         No JVD,      No murmurs,      No edema  Respiratory: Lungs clear to auscultation	  Psychiatry: Mood & affect appropriate  Gastrointestinal:  Soft, Non-tender	  Skin: No rashes, No ecchymoses, No cyanosis	  Neurologic: Non-focal  Extremities: Normal range of motion, No clubbing, cyanosis   Vascular: warm extremities        LABS:	 	    CBC Full  -  ( 04 Oct 2017 06:58 )  WBC Count : 9.1 K/uL  Hemoglobin : 9.0 g/dL  Hematocrit : 27.5 %  Platelet Count - Automated : 294 K/uL  Mean Cell Volume : 91.8 fl  Mean Cell Hemoglobin : 30.2 pg  Mean Cell Hemoglobin Concentration : 32.9 gm/dL  Auto Neutrophil # : x  Auto Lymphocyte # : x  Auto Monocyte # : x  Auto Eosinophil # : x  Auto Basophil # : x  Auto Neutrophil % : x  Auto Lymphocyte % : x  Auto Monocyte % : x  Auto Eosinophil % : x  Auto Basophil % : x    10-04    138  |  94<L>  |  93<H>  ----------------------------<  109<H>  5.6<H>   |  21<L>  |  8.29<H>  10-03    139  |  94<L>  |  52<H>  ----------------------------<  125<H>  4.7   |  24  |  4.75<H>    Ca    8.9      04 Oct 2017 06:58  Ca    8.8      03 Oct 2017 04:50  Phos  7.7     10-04  Phos  4.7     10-03  Mg     2.6     10-04  Mg     2.1     10-03        proBNP:     TELEMETRY:

## 2017-10-05 LAB
ANION GAP SERPL CALC-SCNC: 17 MMOL/L — SIGNIFICANT CHANGE UP (ref 5–17)
BASOPHILS # BLD AUTO: 0.1 K/UL — SIGNIFICANT CHANGE UP (ref 0–0.2)
BASOPHILS NFR BLD AUTO: 0.6 % — SIGNIFICANT CHANGE UP (ref 0–2)
BUN SERPL-MCNC: 64 MG/DL — HIGH (ref 7–23)
CALCIUM SERPL-MCNC: 9 MG/DL — SIGNIFICANT CHANGE UP (ref 8.4–10.5)
CHLORIDE SERPL-SCNC: 94 MMOL/L — LOW (ref 96–108)
CO2 SERPL-SCNC: 23 MMOL/L — SIGNIFICANT CHANGE UP (ref 22–31)
CREAT SERPL-MCNC: 6.01 MG/DL — HIGH (ref 0.5–1.3)
EOSINOPHIL # BLD AUTO: 0.1 K/UL — SIGNIFICANT CHANGE UP (ref 0–0.5)
EOSINOPHIL NFR BLD AUTO: 1.2 % — SIGNIFICANT CHANGE UP (ref 0–6)
GLUCOSE SERPL-MCNC: 120 MG/DL — HIGH (ref 70–99)
HCT VFR BLD CALC: 30 % — LOW (ref 39–50)
HGB BLD-MCNC: 9.8 G/DL — LOW (ref 13–17)
INR BLD: 2.5 RATIO — HIGH (ref 0.88–1.16)
LYMPHOCYTES # BLD AUTO: 1.9 K/UL — SIGNIFICANT CHANGE UP (ref 1–3.3)
LYMPHOCYTES # BLD AUTO: 18.6 % — SIGNIFICANT CHANGE UP (ref 13–44)
MAGNESIUM SERPL-MCNC: 2.3 MG/DL — SIGNIFICANT CHANGE UP (ref 1.6–2.6)
MCHC RBC-ENTMCNC: 30 PG — SIGNIFICANT CHANGE UP (ref 27–34)
MCHC RBC-ENTMCNC: 32.7 GM/DL — SIGNIFICANT CHANGE UP (ref 32–36)
MCV RBC AUTO: 91.8 FL — SIGNIFICANT CHANGE UP (ref 80–100)
MONOCYTES # BLD AUTO: 0.7 K/UL — SIGNIFICANT CHANGE UP (ref 0–0.9)
MONOCYTES NFR BLD AUTO: 7.5 % — SIGNIFICANT CHANGE UP (ref 2–14)
NEUTROPHILS # BLD AUTO: 7.2 K/UL — SIGNIFICANT CHANGE UP (ref 1.8–7.4)
NEUTROPHILS NFR BLD AUTO: 72.1 % — SIGNIFICANT CHANGE UP (ref 43–77)
PHOSPHATE SERPL-MCNC: 6 MG/DL — HIGH (ref 2.5–4.5)
PLATELET # BLD AUTO: 306 K/UL — SIGNIFICANT CHANGE UP (ref 150–400)
POTASSIUM SERPL-MCNC: 5.2 MMOL/L — SIGNIFICANT CHANGE UP (ref 3.5–5.3)
POTASSIUM SERPL-SCNC: 5.2 MMOL/L — SIGNIFICANT CHANGE UP (ref 3.5–5.3)
PROTHROM AB SERPL-ACNC: 27.8 SEC — HIGH (ref 9.8–12.7)
RBC # BLD: 3.27 M/UL — LOW (ref 4.2–5.8)
RBC # FLD: 14.7 % — HIGH (ref 10.3–14.5)
SODIUM SERPL-SCNC: 134 MMOL/L — LOW (ref 135–145)
WBC # BLD: 10 K/UL — SIGNIFICANT CHANGE UP (ref 3.8–10.5)
WBC # FLD AUTO: 10 K/UL — SIGNIFICANT CHANGE UP (ref 3.8–10.5)

## 2017-10-05 PROCEDURE — 99232 SBSQ HOSP IP/OBS MODERATE 35: CPT

## 2017-10-05 PROCEDURE — 99232 SBSQ HOSP IP/OBS MODERATE 35: CPT | Mod: GC

## 2017-10-05 RX ADMIN — Medication 25 MILLIGRAM(S): at 06:00

## 2017-10-05 RX ADMIN — TAMSULOSIN HYDROCHLORIDE 0.4 MILLIGRAM(S): 0.4 CAPSULE ORAL at 21:56

## 2017-10-05 RX ADMIN — CLOPIDOGREL BISULFATE 75 MILLIGRAM(S): 75 TABLET, FILM COATED ORAL at 14:23

## 2017-10-05 RX ADMIN — Medication 1: at 14:23

## 2017-10-05 RX ADMIN — AMIODARONE HYDROCHLORIDE 200 MILLIGRAM(S): 400 TABLET ORAL at 06:00

## 2017-10-05 RX ADMIN — Medication 1 TABLET(S): at 14:23

## 2017-10-05 RX ADMIN — Medication 25 MILLIGRAM(S): at 18:17

## 2017-10-05 RX ADMIN — Medication 5 MILLIGRAM(S): at 06:00

## 2017-10-05 RX ADMIN — Medication 81 MILLIGRAM(S): at 14:23

## 2017-10-05 RX ADMIN — PANTOPRAZOLE SODIUM 40 MILLIGRAM(S): 20 TABLET, DELAYED RELEASE ORAL at 14:24

## 2017-10-05 RX ADMIN — Medication 5 MILLIGRAM(S): at 18:18

## 2017-10-05 RX ADMIN — ATORVASTATIN CALCIUM 40 MILLIGRAM(S): 80 TABLET, FILM COATED ORAL at 21:56

## 2017-10-05 NOTE — PROGRESS NOTE ADULT - ATTENDING COMMENTS
Agree with R1/R2 resident plan of care.  72 M with h/o CAD, T2DM, CKD, prior CVA transferred from OSH for NSTEMI s/p Impella-assisted LHC w/ BABAR x5 and obstructive nephropathy requiring HD transferred from CCU after PEA/Vtach arrest with ROSC c/b new embolic CVA.  - Afebrile, no seizure, No acute event on Tele, Vitals stable. No distress.  - Palliative care spoke to Daughter, she wants dialysis to continue. Will put perm cath by IR once INR <2, no coumadin now  - c/w ASA, Plavix, BB, statin and amiodarone  - PPM by card before d/c

## 2017-10-05 NOTE — PROGRESS NOTE ADULT - PROBLEM SELECTOR PLAN 3
Patient with new-onset paroxysmal Afib/flutter, additional short episode of Afib overnight, converted to NSR  - c/w amiodarone 200 QD per EP  - daily INR checks, will dose coumadin as INR drops, on hold for now for possible permacath

## 2017-10-05 NOTE — PROGRESS NOTE ADULT - SUBJECTIVE AND OBJECTIVE BOX
Interval History: No acute issues overnight. Patient remains non-verbal    Review Of Systems:  Constitutional: [ ] Fever [ ] Chills [ ] Fatigue [ ] Weight change   HEENT: [ ] Blurred vision [ ] Eye Pain [ ] Headache [ ] Runny nose [ ] Sore Throat   Respiratory: [ ] Cough [ ] Wheezing [ ] Shortness of breath  Cardiovascular: [ ] Chest Pain [ ] Palpitations [ ] CHAMPION [ ] PND [ ] Orthopnea  Gastrointestinal: [ ] Abdominal Pain [ ] Diarrhea [ ] Constipation [ ] Hemorrhoids [ ] Nausea [ ] Vomiting  Genitourinary: [ ] Nocturia [ ] Dysuria [ ] Incontinence  Extremities: [ ] Swelling [ ] Joint Pain  Neurologic: [ ] Focal deficit [ ] Paresthesias [ ] Syncope  Lymphatic: [ ] Swelling [ ] Lymphadenopathy   Skin: [ ] Rash [ ] Ecchymoses [ ] Wounds [ ] Lesions  Psychiatry: [ ] Depression [ ] Suicidal/Homicidal Ideation [ ] Anxiety [ ] Sleep Disturbances  [ ] 10 point review of systems is otherwise negative except as mentioned above            [x ]Unable to obtain    Medications:  amiodarone    Tablet 200 milliGRAM(s) Oral daily  aspirin  chewable 81 milliGRAM(s) Oral daily  atorvastatin 40 milliGRAM(s) Oral at bedtime  busPIRone 5 milliGRAM(s) Oral two times a day  clopidogrel Tablet 75 milliGRAM(s) Oral daily  dextrose 5%. 1000 milliLiter(s) IV Continuous <Continuous>  dextrose 50% Injectable 12.5 Gram(s) IV Push once  dextrose 50% Injectable 25 Gram(s) IV Push once  dextrose 50% Injectable 25 Gram(s) IV Push once  dextrose Gel 1 Dose(s) Oral once PRN  epoetin georgie Injectable 88562 Unit(s) IV Push every other day  glucagon  Injectable 1 milliGRAM(s) IntraMuscular once PRN  insulin lispro (HumaLOG) corrective regimen sliding scale   SubCutaneous three times a day before meals  insulin lispro (HumaLOG) corrective regimen sliding scale   SubCutaneous at bedtime  metoprolol 25 milliGRAM(s) Enteral Tube two times a day  Nephro-sophie 1 Tablet(s) Oral daily  pantoprazole  Injectable 40 milliGRAM(s) IV Push daily  sevelamer hydrochloride 1600 milliGRAM(s) Oral three times a day with meals  tamsulosin 0.4 milliGRAM(s) Oral at bedtime      Vitals:  T(C): 37.1 (10-05-17 @ 12:32), Max: 37.1 (10-05-17 @ 12:32)  HR: 78 (10-05-17 @ 12:32) (78 - 112)  BP: 109/70 (10-05-17 @ 12:32) (109/70 - 139/84)  BP(mean): --  RR: 18 (10-05-17 @ 12:32) (18 - 19)  SpO2: 100% (10-05-17 @ 12:32) (98% - 100%)  Wt(kg): --  Daily     Daily Weight in k (05 Oct 2017 04:19)  I&O's Summary    04 Oct 2017 07:01  -  05 Oct 2017 07:00  --------------------------------------------------------  IN: 460 mL / OUT: 1000 mL / NET: -540 mL        Physical Exam:  Appearance: NAD; reponds to name  Eyes: PERRL, EOMI  HENT: Normal oral muscosa NC/AT  Lymphatic: No lymphadenopathy  Cardiovascular: Normal S1 S2, No JVD, No murmurs, No edema  Respiratory: Lungs clear to auscultation	  Gastrointestinal:  Soft, Non-tender, + BS	  Skin: ecchymosis on left proximal anterior thigh which extends to LLQ/Left flank	  Neurologic: Nonverbal; able to move left arm and leg  Extremities: Normal range of motion, No clubbing, cyanosis or edema  Vascular: Peripheral pulses palpable 2+ bilaterally    Labs:                        9.8    10.0  )-----------( 306      ( 05 Oct 2017 05:58 )             30.0     10-05    134<L>  |  94<L>  |  64<H>  ----------------------------<  120<H>  5.2   |  23  |  6.01<H>    Ca    9.0      05 Oct 2017 05:58  Phos  6.0     10-05  Mg     2.3     10-05      PT/INR - ( 05 Oct 2017 05:58 )   PT: 27.8 sec;   INR: 2.50 ratio           Interpretation of Telemetry: sinus 70-90's / Yesterday 3-11 shift had documented afib. currently sinus

## 2017-10-05 NOTE — CHART NOTE - NSCHARTNOTEFT_GEN_A_CORE
Source: Patient [ ]    Family [ ]     other [x ]PCA, chart    Diet : PUREED HONEY CONSISTENCY LIQUID/CSTCHOSN/RENAL  pt receives HD, he is non-verbal  PMHx of extensive CAD, T2DM, ESRD new HD, prior CVA w/ multiple sites of infarcts who was transferred from OSH for NSTEMI s/p impella assisted LHC and pyelonephritis with course c/b obstructive nephropathy requiring HD transferred from floor to CCU after cardiac arrest, v-tach and subsequent ROSC w/ hospital course further c/b embolic CVA (9/23). GOC discussion had and plans are to continue with dialysis and continue thick feeds, as per Cardiology note.  Being followed by Palliative    Patient reports [ ] nausea  [ ] vomiting [ ] diarrhea [ ] constipation  [ ]chewing problems [ ] swallowing issues  [ ] other:      PO intake:  < 50% [ ] 50-75% [ ]   % [X ]  other :OB     Source for PO intake [ ] Patient [ ] family [ ] chart [X ] staff [ X] other: OBSERVED AT BREAKFAST THIS MORNING     Enteral /Parenteral Nutrition:       Current Weight: 165.3POUNDS/75KG  % Weight Change 13% LOSS SINCE 9/13 AS PER FLOW SHEET    Pertinent Medications: MEDICATIONS  (STANDING):  amiodarone    Tablet 200 milliGRAM(s) Oral daily  aspirin  chewable 81 milliGRAM(s) Oral daily  atorvastatin 40 milliGRAM(s) Oral at bedtime  busPIRone 5 milliGRAM(s) Oral two times a day  clopidogrel Tablet 75 milliGRAM(s) Oral daily  dextrose 5%. 1000 milliLiter(s) (50 mL/Hr) IV Continuous <Continuous>  dextrose 50% Injectable 12.5 Gram(s) IV Push once  dextrose 50% Injectable 25 Gram(s) IV Push once  dextrose 50% Injectable 25 Gram(s) IV Push once  epoetin georgie Injectable 69222 Unit(s) IV Push every other day  insulin lispro (HumaLOG) corrective regimen sliding scale   SubCutaneous three times a day before meals  insulin lispro (HumaLOG) corrective regimen sliding scale   SubCutaneous at bedtime  metoprolol 25 milliGRAM(s) Enteral Tube two times a day  pantoprazole  Injectable 40 milliGRAM(s) IV Push daily  sevelamer hydrochloride 1600 milliGRAM(s) Oral three times a day with meals  tamsulosin 0.4 milliGRAM(s) Oral at bedtime    MEDICATIONS  (PRN):  dextrose Gel 1 Dose(s) Oral once PRN Blood Glucose LESS THAN 70 milliGRAM(s)/deciLiter  glucagon  Injectable 1 milliGRAM(s) IntraMuscular once PRN Glucose <70 milliGRAM(s)/deciLiter    Pertinent Labs:  10-05 Na134 mmol/L<L> Glu 120 mg/dL<H> K+ 5.2 mmol/L Cr  6.01 mg/dL<H> BUN 64 mg/dL<H> Phos 6.0 mg/dL<H>       CAPILLARY BLOOD GLUCOSE  147 (05 Oct 2017 09:40)  216 (04 Oct 2017 20:52)  147 (04 Oct 2017 18:15)  140 (04 Oct 2017 16:20)  154 (04 Oct 2017 12:48)        Hemoglobin A1C, Whole Blood: 6.5 % (09-13 @ 10:32)           Skin: +1 LEFT ARM, s/p CATH    Estimated Needs:   [X ] no change since previous assessment  [ ] recalculated:       Previous Nutrition Diagnosis:     [X ] Inadequate protein-Energy Intake [ ]Inadequate Oral Intake [ ] Excessive Energy Intake     [ ] Underweight [ ] Increased Nutrient Needs [ ] Overweight/Obesity     [ ] Altered GI Function [ ] Unintended Weight Loss [ ] Food & Nutrition Related Knowledge Deficit [ ] Malnutrition          Nutrition Diagnosis is [x ] ongoing  [ ] resolved [ ] not applicable-being addressed with supplements.         New Nutrition Diagnosis: [ ] not applicable    [ ] Inadequate Protein Energy Intake [ ]Inadequate Oral Intake [ ] Excessive Energy Intake     [ ] Underweight [ ] Increased Nutrient Needs [ ] Overweight/Obesity     [ ] Altered GI Function [ ] Unintended Weight Loss [ ] Food & Nutrition Related Knowledge Deficit[ ] Limited Adherence to nutrition related recommendations [ ] Malnutrition  [ ] other: Free text       Related to:      As evidenced by:      Interventions:     Recommend    [ ] Change Diet To:    [ x] Nutrition Supplement: Nepro x 1 can daily    [ ] Nutrition Support    [ x] Other: Nephro-sophie daily       Monitoring and Evaluation:     [x ] PO intake [ x] Tolerance to diet prescription [ x] weights [ x] follow up per protocol    [ ] other:

## 2017-10-05 NOTE — PROGRESS NOTE ADULT - PROBLEM SELECTOR PLAN 1
patient with new ESRD complicated by hyperkalemia yesterday, now resolved s/p HD  - will need to transition to Permacath for HD access, as family would like to continue HD  - avoid nephrotoxins, renally dose meds  - c/w HD per nephrology  - d/w IR for permacath, will require INR<2 for permacath

## 2017-10-05 NOTE — PROGRESS NOTE ADULT - ASSESSMENT
73 y/o M w/ a PMHx of extensive CAD, T2DM, ESRD new HD, prior CVA w/ multiple sites of infarcts who was transferred from OSH for NSTEMI s/p impella assisted LHC and pyelonephritis with course c/b obstructive nephropathy requiring HD transferred from floor to CCU after cardiac arrest, v-tach and subsequent ROSC w/ hospital course further c/b embolic CVA (9/23). GOC discussion had and plans are to continue with dialysis and continue thick feeds    # NSTEMI c/b VT arrest s/p impella-assisted PCI with BABAR x 5 to LAD/RCA. Now stable  -cont. DAPT, lipitor 40mg QD, metoprolol 25mg PO BID    # ICM (EF~20%)- NYHA 2-3, ACC/AHA C-currently euvolemic  - hold ACE/ARB for now  - c/w metoprolol at 25mg bid  - EP encounter indicates patient to receive pacemaker    # Atrial fibrillation/flutter -currently in NSR  - cont PO amio 200mg QD  - dose coumadin. Goal INR 2-3

## 2017-10-05 NOTE — PROGRESS NOTE ADULT - SUBJECTIVE AND OBJECTIVE BOX
Patient is a 72y old  Male who presents with a chief complaint of s/p cardiac cath (22 Sep 2017 12:34)        SUBJECTIVE / OVERNIGHT EVENTS: Pt nonverbal with R hemiplegia. Responsive with motor strength on left side. Afebrile overnight with acute events. D/w IR, permacath can be done with INR<2.       MEDICATIONS  (STANDING):  amiodarone    Tablet 200 milliGRAM(s) Oral daily  aspirin  chewable 81 milliGRAM(s) Oral daily  atorvastatin 40 milliGRAM(s) Oral at bedtime  busPIRone 5 milliGRAM(s) Oral two times a day  clopidogrel Tablet 75 milliGRAM(s) Oral daily  dextrose 5%. 1000 milliLiter(s) (50 mL/Hr) IV Continuous <Continuous>  dextrose 50% Injectable 12.5 Gram(s) IV Push once  dextrose 50% Injectable 25 Gram(s) IV Push once  dextrose 50% Injectable 25 Gram(s) IV Push once  epoetin georgie Injectable 30094 Unit(s) IV Push every other day  insulin lispro (HumaLOG) corrective regimen sliding scale   SubCutaneous three times a day before meals  insulin lispro (HumaLOG) corrective regimen sliding scale   SubCutaneous at bedtime  metoprolol 25 milliGRAM(s) Enteral Tube two times a day  Nephro-sophie 1 Tablet(s) Oral daily  pantoprazole  Injectable 40 milliGRAM(s) IV Push daily  sevelamer hydrochloride 1600 milliGRAM(s) Oral three times a day with meals  tamsulosin 0.4 milliGRAM(s) Oral at bedtime    MEDICATIONS  (PRN):  dextrose Gel 1 Dose(s) Oral once PRN Blood Glucose LESS THAN 70 milliGRAM(s)/deciLiter  glucagon  Injectable 1 milliGRAM(s) IntraMuscular once PRN Glucose <70 milliGRAM(s)/deciLiter      T(C): 37.1 (10-05-17 @ 12:32), Max: 37.1 (10-05-17 @ 12:32)  HR: 78 (10-05-17 @ 12:32) (78 - 112)  BP: 109/70 (10-05-17 @ 12:32) (109/70 - 139/84)  RR: 18 (10-05-17 @ 12:32) (18 - 19)  SpO2: 100% (10-05-17 @ 12:32) (97% - 100%)  CAPILLARY BLOOD GLUCOSE  147 (05 Oct 2017 09:40)  216 (04 Oct 2017 20:52)  147 (04 Oct 2017 18:15)  140 (04 Oct 2017 16:20)  154 (04 Oct 2017 12:48)        I&O's Summary    04 Oct 2017 07:01  -  05 Oct 2017 07:00  --------------------------------------------------------  IN: 460 mL / OUT: 1000 mL / NET: -540 mL        PHYSICAL EXAM  GENERAL: NAD, well-developed  HEAD:  Atraumatic, Normocephalic  EYES: EOMI, PERRLA, conjunctiva and sclera clear  NECK: Supple, No JVD  CHEST/LUNG: Clear to auscultation bilaterally; No wheeze  HEART: Regular rate and rhythm; No murmurs, rubs, or gallops  ABDOMEN: Soft, Nontender, Nondistended; Bowel sounds present  EXTREMITIES:  2+ Peripheral Pulses, right hemiplegia.   PSYCH: unresponsive.   SKIN: No rashes or lesions    LABS:                        9.8    10.0  )-----------( 306      ( 05 Oct 2017 05:58 )             30.0     10-05    134<L>  |  94<L>  |  64<H>  ----------------------------<  120<H>  5.2   |  23  |  6.01<H>    Ca    9.0      05 Oct 2017 05:58  Phos  6.0     10-05  Mg     2.3     10-05      PT/INR - ( 05 Oct 2017 05:58 )   PT: 27.8 sec;   INR: 2.50 ratio                   RADIOLOGY & ADDITIONAL TESTS:    Imaging Personally Reviewed:  Consultant(s) Notes Reviewed:    Care Discussed with Consultants/Other Providers:

## 2017-10-05 NOTE — PROGRESS NOTE ADULT - SUBJECTIVE AND OBJECTIVE BOX
Non-verbal post CVA, more alert, not in distress    Vital Signs Last 24 Hrs  T(C): 37.1 (10-05-17 @ 12:32), Max: 37.1 (10-05-17 @ 12:32)  T(F): 98.7 (10-05-17 @ 12:32), Max: 98.7 (10-05-17 @ 12:32)  HR: 76 (10-05-17 @ 14:27) (76 - 112)  BP: 124/77 (10-05-17 @ 14:27) (109/70 - 139/84)  BP(mean): --  RR: 18 (10-05-17 @ 12:32) (18 - 19)  SpO2: 98% (10-05-17 @ 14:27) (98% - 100%)                                   9.8    10.0  )-----------( 306      ( 05 Oct 2017 05:58 )             30.0     05 Oct 2017 05:58    134    |  94     |  64     ----------------------------<  120    5.2     |  23     |  6.01     Ca    9.0        05 Oct 2017 05:58  Phos  6.0       05 Oct 2017 05:58  Mg     2.3       05 Oct 2017 05:58      General: NAD  Respiratory: good air entry  Cardiovascular: S1 S2   Gastrointestinal: soft, ND, BS present  Extremities:  no edema    amiodarone    Tablet 200 milliGRAM(s) Oral daily  aspirin  chewable 81 milliGRAM(s) Oral daily  atorvastatin 40 milliGRAM(s) Oral at bedtime  busPIRone 5 milliGRAM(s) Oral two times a day  clopidogrel Tablet 75 milliGRAM(s) Oral daily  dextrose 5%. 1000 milliLiter(s) IV Continuous <Continuous>  dextrose 50% Injectable 12.5 Gram(s) IV Push once  dextrose 50% Injectable 25 Gram(s) IV Push once  dextrose 50% Injectable 25 Gram(s) IV Push once  dextrose Gel 1 Dose(s) Oral once PRN  epoetin georgie Injectable 67125 Unit(s) IV Push every other day  glucagon  Injectable 1 milliGRAM(s) IntraMuscular once PRN  insulin lispro (HumaLOG) corrective regimen sliding scale   SubCutaneous three times a day before meals  insulin lispro (HumaLOG) corrective regimen sliding scale   SubCutaneous at bedtime  metoprolol 25 milliGRAM(s) Enteral Tube two times a day  Nephro-sophie 1 Tablet(s) Oral daily  pantoprazole  Injectable 40 milliGRAM(s) IV Push daily  sevelamer hydrochloride 1600 milliGRAM(s) Oral three times a day with meals  tamsulosin 0.4 milliGRAM(s) Oral at bedtime        Assessment and Plan:   		  CM, NSTEMI, s/p cath, PCI  S/p arrest, intubation  PAfib, s/p CVA  S/p LIN on CKD in setting of non-fx L kidney and R hydro due to stone plus hemodynamic/dye injury  Started on HD in [facility]  Subsequently passed stone and hydro resolved  No signs of renal fx recovery so far  On HD MWF  Procrit w/HD  Renal diet  While most likely pt will require long term dialysis, at this point would avoid ACE/ARB or other nephrotoxins if possible  Needs perm cath for OP HD prior to d/c

## 2017-10-05 NOTE — PROGRESS NOTE ADULT - PROBLEM SELECTOR PLAN 10
- DVT PPx: c/w coumadin as INR allows  - f/u palliative care c/s re: further GOC discussions  - patient is DNR but not DNI per discussion with daughter - DVT PPx: c/w coumadin as INR allows  - patient is DNR/DNI

## 2017-10-05 NOTE — PROGRESS NOTE ADULT - ATTENDING COMMENTS
72 year old man complicated course including ventricular tachycardia cardiac arrest, NSTEMI, Impella assisted PCI, CVA. Electrophysiology consideration for ICD, but not anticipated at this time. Patient is non-verbal, continues to tolerate hemodialysis without evident chest pain and mostly maintaining sinus rhythm after Amiodarone loading. On optimal medical regimen.

## 2017-10-06 LAB
ANION GAP SERPL CALC-SCNC: 25 MMOL/L — HIGH (ref 5–17)
BASOPHILS # BLD AUTO: 0.1 K/UL — SIGNIFICANT CHANGE UP (ref 0–0.2)
BASOPHILS NFR BLD AUTO: 0.7 % — SIGNIFICANT CHANGE UP (ref 0–2)
BUN SERPL-MCNC: 98 MG/DL — HIGH (ref 7–23)
CALCIUM SERPL-MCNC: 8.7 MG/DL — SIGNIFICANT CHANGE UP (ref 8.4–10.5)
CHLORIDE SERPL-SCNC: 93 MMOL/L — LOW (ref 96–108)
CO2 SERPL-SCNC: 20 MMOL/L — LOW (ref 22–31)
CREAT SERPL-MCNC: 8.46 MG/DL — HIGH (ref 0.5–1.3)
EOSINOPHIL # BLD AUTO: 0.2 K/UL — SIGNIFICANT CHANGE UP (ref 0–0.5)
EOSINOPHIL NFR BLD AUTO: 2 % — SIGNIFICANT CHANGE UP (ref 0–6)
GLUCOSE SERPL-MCNC: 101 MG/DL — HIGH (ref 70–99)
HCT VFR BLD CALC: 30.1 % — LOW (ref 39–50)
HGB BLD-MCNC: 10 G/DL — LOW (ref 13–17)
INR BLD: 1.93 RATIO — HIGH (ref 0.88–1.16)
LYMPHOCYTES # BLD AUTO: 1.2 K/UL — SIGNIFICANT CHANGE UP (ref 1–3.3)
LYMPHOCYTES # BLD AUTO: 14.4 % — SIGNIFICANT CHANGE UP (ref 13–44)
MAGNESIUM SERPL-MCNC: 2.5 MG/DL — SIGNIFICANT CHANGE UP (ref 1.6–2.6)
MCHC RBC-ENTMCNC: 30.2 PG — SIGNIFICANT CHANGE UP (ref 27–34)
MCHC RBC-ENTMCNC: 33.1 GM/DL — SIGNIFICANT CHANGE UP (ref 32–36)
MCV RBC AUTO: 91.2 FL — SIGNIFICANT CHANGE UP (ref 80–100)
MONOCYTES # BLD AUTO: 0.5 K/UL — SIGNIFICANT CHANGE UP (ref 0–0.9)
MONOCYTES NFR BLD AUTO: 5.8 % — SIGNIFICANT CHANGE UP (ref 2–14)
NEUTROPHILS # BLD AUTO: 6.4 K/UL — SIGNIFICANT CHANGE UP (ref 1.8–7.4)
NEUTROPHILS NFR BLD AUTO: 77 % — SIGNIFICANT CHANGE UP (ref 43–77)
PHOSPHATE SERPL-MCNC: 8.3 MG/DL — HIGH (ref 2.5–4.5)
PLATELET # BLD AUTO: 340 K/UL — SIGNIFICANT CHANGE UP (ref 150–400)
POTASSIUM SERPL-MCNC: 5.8 MMOL/L — HIGH (ref 3.5–5.3)
POTASSIUM SERPL-SCNC: 5.8 MMOL/L — HIGH (ref 3.5–5.3)
PROTHROM AB SERPL-ACNC: 21.1 SEC — HIGH (ref 9.8–12.7)
RBC # BLD: 3.3 M/UL — LOW (ref 4.2–5.8)
RBC # FLD: 14.3 % — SIGNIFICANT CHANGE UP (ref 10.3–14.5)
SODIUM SERPL-SCNC: 138 MMOL/L — SIGNIFICANT CHANGE UP (ref 135–145)
WBC # BLD: 8.4 K/UL — SIGNIFICANT CHANGE UP (ref 3.8–10.5)
WBC # FLD AUTO: 8.4 K/UL — SIGNIFICANT CHANGE UP (ref 3.8–10.5)

## 2017-10-06 PROCEDURE — 99232 SBSQ HOSP IP/OBS MODERATE 35: CPT

## 2017-10-06 PROCEDURE — 99233 SBSQ HOSP IP/OBS HIGH 50: CPT | Mod: GC

## 2017-10-06 RX ORDER — CALCIUM ACETATE 667 MG
667 TABLET ORAL
Qty: 0 | Refills: 0 | Status: DISCONTINUED | OUTPATIENT
Start: 2017-10-06 | End: 2017-10-18

## 2017-10-06 RX ORDER — METOPROLOL TARTRATE 50 MG
25 TABLET ORAL
Qty: 0 | Refills: 0 | Status: DISCONTINUED | OUTPATIENT
Start: 2017-10-06 | End: 2017-10-18

## 2017-10-06 RX ADMIN — Medication 81 MILLIGRAM(S): at 10:03

## 2017-10-06 RX ADMIN — Medication 5 MILLIGRAM(S): at 06:13

## 2017-10-06 RX ADMIN — CLOPIDOGREL BISULFATE 75 MILLIGRAM(S): 75 TABLET, FILM COATED ORAL at 10:02

## 2017-10-06 RX ADMIN — Medication 25 MILLIGRAM(S): at 06:18

## 2017-10-06 RX ADMIN — Medication 667 MILLIGRAM(S): at 18:52

## 2017-10-06 RX ADMIN — PANTOPRAZOLE SODIUM 40 MILLIGRAM(S): 20 TABLET, DELAYED RELEASE ORAL at 10:02

## 2017-10-06 RX ADMIN — Medication 5 MILLIGRAM(S): at 18:51

## 2017-10-06 RX ADMIN — AMIODARONE HYDROCHLORIDE 200 MILLIGRAM(S): 400 TABLET ORAL at 06:12

## 2017-10-06 RX ADMIN — TAMSULOSIN HYDROCHLORIDE 0.4 MILLIGRAM(S): 0.4 CAPSULE ORAL at 21:21

## 2017-10-06 RX ADMIN — ERYTHROPOIETIN 10000 UNIT(S): 10000 INJECTION, SOLUTION INTRAVENOUS; SUBCUTANEOUS at 14:23

## 2017-10-06 RX ADMIN — ATORVASTATIN CALCIUM 40 MILLIGRAM(S): 80 TABLET, FILM COATED ORAL at 21:21

## 2017-10-06 RX ADMIN — Medication 1 TABLET(S): at 10:02

## 2017-10-06 RX ADMIN — Medication: at 18:50

## 2017-10-06 NOTE — PROGRESS NOTE ADULT - PROBLEM SELECTOR PLAN 3
Patient with new-onset paroxysmal Afib/flutter, additional short episode of Afib overnight, converted to NSR  - c/w amiodarone 200 QD per EP  - daily INR checks, will dose coumadin as INR drops, on hold for now for permacath

## 2017-10-06 NOTE — PROGRESS NOTE ADULT - PROBLEM SELECTOR PLAN 6
Patient with worsening systolic dysfunction s/p NSTEMI/arrest  - as with family GOC, EP can place single lead PPM when ready  - c/w fluid removal by intermittent HD

## 2017-10-06 NOTE — PROGRESS NOTE ADULT - SUBJECTIVE AND OBJECTIVE BOX
Seen on HD, non-verbal post CVA    Vital Signs Last 24 Hrs  T(C): 36.9 (10-06-17 @ 12:28), Max: 37.2 (10-05-17 @ 20:12)  T(F): 98.4 (10-06-17 @ 12:28), Max: 98.9 (10-05-17 @ 20:12)  HR: 77 (10-06-17 @ 12:28) (75 - 82)  BP: 123/77 (10-06-17 @ 12:28) (123/77 - 143/80)  BP(mean): --  RR: 17 (10-06-17 @ 12:28) (17 - 18)  SpO2: 98% (10-06-17 @ 12:28) (97% - 98%)                                            10.0   8.4   )-----------( 340      ( 06 Oct 2017 06:56 )             30.1     06 Oct 2017 06:56    138    |  93     |  98     ----------------------------<  101    5.8     |  20     |  8.46     Ca    8.7        06 Oct 2017 06:56  Phos  8.3       06 Oct 2017 06:56  Mg     2.5       06 Oct 2017 06:56    General: NAD  Respiratory: good air entry  Cardiovascular: S1 S2   Gastrointestinal: soft, ND, BS present  Extremities:  no edema    amiodarone    Tablet 200 milliGRAM(s) Oral daily  aspirin  chewable 81 milliGRAM(s) Oral daily  atorvastatin 40 milliGRAM(s) Oral at bedtime  busPIRone 5 milliGRAM(s) Oral two times a day  calcium acetate 667 milliGRAM(s) Oral three times a day with meals  clopidogrel Tablet 75 milliGRAM(s) Oral daily  dextrose 5%. 1000 milliLiter(s) IV Continuous <Continuous>  dextrose 50% Injectable 12.5 Gram(s) IV Push once  dextrose 50% Injectable 25 Gram(s) IV Push once  dextrose 50% Injectable 25 Gram(s) IV Push once  dextrose Gel 1 Dose(s) Oral once PRN  epoetin georgie Injectable 31625 Unit(s) IV Push every other day  glucagon  Injectable 1 milliGRAM(s) IntraMuscular once PRN  insulin lispro (HumaLOG) corrective regimen sliding scale   SubCutaneous three times a day before meals  insulin lispro (HumaLOG) corrective regimen sliding scale   SubCutaneous at bedtime  metoprolol 25 milliGRAM(s) Oral two times a day  Nephro-sophie 1 Tablet(s) Oral daily  pantoprazole  Injectable 40 milliGRAM(s) IV Push daily  tamsulosin 0.4 milliGRAM(s) Oral at bedtime      Assessment and Plan:   		  CM, NSTEMI, s/p cath, PCI  S/p arrest, intubation  PAfib, s/p CVA  For PPM   S/p LIN on CKD in setting of non-fx L kidney and R hydro due to stone plus hemodynamic/dye injury  Started on HD in [facility]  Subsequently passed stone and hydro resolved, but no signs of renal fx recovery so far  On HD MWF  Procrit w/HD  Renal diet  While most likely pt will require long term dialysis, would still avoid ACE/ARB or other nephrotoxins for now  For perm cath with IR  D/c planning to rehab when ready

## 2017-10-06 NOTE — PROGRESS NOTE ADULT - ATTENDING COMMENTS
72 year old man complicated course including ventricular tachycardia cardiac arrest, NSTEMI, Impella assisted PCI, CVA. Electrophysiology consideration for ICD, but not anticipated at this time, but now pacemaker planned. Patient is non-verbal, continues to tolerate hemodialysis without evident chest pain and mostly maintaining sinus rhythm after Amiodarone loading. On optimal medical regimen.

## 2017-10-06 NOTE — PROGRESS NOTE ADULT - ATTENDING COMMENTS
Agree with R1/R2 resident plan of care  No acute SOB, HA, chest pain.  - IR will put perm cath once INR <1.5, held AC  - c/w current meds, aspiration precaution Agree with R1/R2 resident plan of care  No acute SOB, HA, chest pain.  - IR will put perm cath once INR <1.5, held AC  - c/w current meds, aspiration precaution  - HD per renal  - DNR/DNI

## 2017-10-06 NOTE — PROGRESS NOTE ADULT - PROBLEM SELECTOR PLAN 1
patient with new ESRD complicated by hyperkalemia, resolved s/p HD  - needs transition to Permacath for HD access, as family would like to continue HD  - avoid nephrotoxins, renally dose meds  - c/w HD per nephrology  - d/w IR for permacath, will require INR<2 for permacath  - tentative permacath for monday with IR

## 2017-10-06 NOTE — PROGRESS NOTE ADULT - PROBLEM SELECTOR PLAN 4
Patient with new dysphagia in the setting of CVA  - honey nectar consistency diet  - tolerating diet, continue to monitor

## 2017-10-06 NOTE — PROGRESS NOTE ADULT - SUBJECTIVE AND OBJECTIVE BOX
HPI:  Lying supine, no evident distress, but non-verbal and does not respond, nod, etc. to questions.    Medications:  amiodarone    Tablet 200 milliGRAM(s) Oral daily  aspirin  chewable 81 milliGRAM(s) Oral daily  atorvastatin 40 milliGRAM(s) Oral at bedtime  busPIRone 5 milliGRAM(s) Oral two times a day  clopidogrel Tablet 75 milliGRAM(s) Oral daily  dextrose 5%. 1000 milliLiter(s) IV Continuous <Continuous>  dextrose 50% Injectable 12.5 Gram(s) IV Push once  dextrose 50% Injectable 25 Gram(s) IV Push once  dextrose 50% Injectable 25 Gram(s) IV Push once  dextrose Gel 1 Dose(s) Oral once PRN  epoetin georgie Injectable 17017 Unit(s) IV Push every other day  glucagon  Injectable 1 milliGRAM(s) IntraMuscular once PRN  insulin lispro (HumaLOG) corrective regimen sliding scale   SubCutaneous three times a day before meals  insulin lispro (HumaLOG) corrective regimen sliding scale   SubCutaneous at bedtime  metoprolol 25 milliGRAM(s) Oral two times a day  Nephro-sophie 1 Tablet(s) Oral daily  pantoprazole  Injectable 40 milliGRAM(s) IV Push daily  sevelamer hydrochloride 1600 milliGRAM(s) Oral three times a day with meals  tamsulosin 0.4 milliGRAM(s) Oral at bedtime    PMH/PSH/FH/SH:  Unchanged    ROS:  unable.    Vitals:  T(C): 37.2 (10-06-17 @ 04:20), Max: 37.2 (10-05-17 @ 20:12)  HR: 81 (10-06-17 @ 05:58) (75 - 82)  BP: 140/74 (10-06-17 @ 05:58) (109/70 - 143/80)  BP(mean): --  RR: 18 (10-06-17 @ 04:20) (18 - 18)  SpO2: 98% (10-06-17 @ 04:20) (97% - 100%)  Wt(kg): --  Daily     Daily Weight in k.2 (06 Oct 2017 04:20)    Physical exam:  Appearance: NAD  Eyes: PERRL, EOMI  HENT: Normal oral [place] NC/AT  Lymphatic: No lymphadenopathy  Cardiovascular: Normal S1 S2, No JVD, No murmurs, No edema  Respiratory: Lungs clear to auscultation	  Gastrointestinal:  Soft, Non-tender, + BS	  Skin: ecchymoses  Neurologic: Nonverbal; able to move left arm and leg  Extremities: Normal range of motion, No clubbing, cyanosis or edema  Vascular: Peripheral pulses palpable 2+ bilaterally    I&O's Summary    05 Oct 2017 07:01  -  06 Oct 2017 07:00  --------------------------------------------------------  IN: 60 mL / OUT: 0 mL / NET: 60 mL    06 Oct 2017 07:01  -  06 Oct 2017 10:13  --------------------------------------------------------  IN: 0 mL / OUT: 0 mL / NET: 0 mL                              10.0   8.4   )-----------( 340      ( 06 Oct 2017 06:56 )             30.1     10-06    138  |  93<L>  |  98<H>  ----------------------------<  101<H>  5.8<H>   |  20<L>  |  8.46<H>    Ca    8.7      06 Oct 2017 06:56  Phos  8.3     10-  Mg     2.5     10-      PT/INR - ( 06 Oct 2017 06:56 )   PT: 21.1 sec;   INR: 1.93 ratio      Interpretation of Telemetry: maintaining sinus rhythm, appropriate rates.

## 2017-10-06 NOTE — PROGRESS NOTE ADULT - ASSESSMENT
71 y/o M w/ a PMHx of extensive CAD, T2DM, ESRD new HD, prior CVA w/ multiple sites of infarcts who was transferred from OSH for NSTEMI s/p impella assisted LHC and pyelonephritis with course c/b obstructive nephropathy requiring HD transferred from floor to CCU after cardiac arrest, v-tach and subsequent ROSC w/ hospital course further c/b embolic CVA (9/23). GOC discussion had and plans are to continue with dialysis and continue thick feeds    # NSTEMI with VT arrest, Impella-assisted PCI with BABAR x 5 to LAD/RCA. Now stable  -cont. DAPT, lipitor 40mg QD, metoprolol 25mg PO BID    # ICM (EF~20%)- NYHA 2-3, ACC/AHA C-currently euvolemic  - hold ACE/ARB for now  - c/w metoprolol at 25mg bid  - EP planning pacemaker implant    # Atrial fibrillation/flutter -currently in NSR  - cont PO amio 200mg QD  - warfarin stopped pending pacemaker implant and Permacath placement.

## 2017-10-06 NOTE — PROGRESS NOTE ADULT - SUBJECTIVE AND OBJECTIVE BOX
Patient is a 72y old  Male who presents with a chief complaint of s/p cardiac cath (22 Sep 2017 12:34)        SUBJECTIVE / OVERNIGHT EVENTS: Spoke with patient through  services (Radha #[id number]).Pt remains nonverbal and with right hemiplegia. Awake and alert in no acute distress.         MEDICATIONS  (STANDING):  amiodarone    Tablet 200 milliGRAM(s) Oral daily  aspirin  chewable 81 milliGRAM(s) Oral daily  atorvastatin 40 milliGRAM(s) Oral at bedtime  busPIRone 5 milliGRAM(s) Oral two times a day  clopidogrel Tablet 75 milliGRAM(s) Oral daily  dextrose 5%. 1000 milliLiter(s) (50 mL/Hr) IV Continuous <Continuous>  dextrose 50% Injectable 12.5 Gram(s) IV Push once  dextrose 50% Injectable 25 Gram(s) IV Push once  dextrose 50% Injectable 25 Gram(s) IV Push once  epoetin georgie Injectable 72350 Unit(s) IV Push every other day  insulin lispro (HumaLOG) corrective regimen sliding scale   SubCutaneous three times a day before meals  insulin lispro (HumaLOG) corrective regimen sliding scale   SubCutaneous at bedtime  metoprolol 25 milliGRAM(s) Oral two times a day  Nephro-sophie 1 Tablet(s) Oral daily  pantoprazole  Injectable 40 milliGRAM(s) IV Push daily  sevelamer hydrochloride 1600 milliGRAM(s) Oral three times a day with meals  tamsulosin 0.4 milliGRAM(s) Oral at bedtime    MEDICATIONS  (PRN):  dextrose Gel 1 Dose(s) Oral once PRN Blood Glucose LESS THAN 70 milliGRAM(s)/deciLiter  glucagon  Injectable 1 milliGRAM(s) IntraMuscular once PRN Glucose <70 milliGRAM(s)/deciLiter      T(C): 37.2 (10-06-17 @ 04:20), Max: 37.2 (10-05-17 @ 20:12)  HR: 81 (10-06-17 @ 05:58) (75 - 82)  BP: 140/74 (10-06-17 @ 05:58) (109/70 - 143/80)  RR: 18 (10-06-17 @ 04:20) (18 - 18)  SpO2: 98% (10-06-17 @ 04:20) (97% - 100%)  CAPILLARY BLOOD GLUCOSE  204 (05 Oct 2017 21:23)  100 (05 Oct 2017 17:52)  183 (05 Oct 2017 12:42)  147 (05 Oct 2017 09:40)        I&O's Summary    05 Oct 2017 07:01  -  06 Oct 2017 07:00  --------------------------------------------------------  IN: 60 mL / OUT: 0 mL / NET: 60 mL        PHYSICAL EXAM  GENERAL: NAD, well-developed  HEAD:  Atraumatic, Normocephalic  EYES: EOMI, PERRLA, conjunctiva and sclera clear  NECK: Supple, No JVD  CHEST/LUNG: Clear to auscultation bilaterally; No wheeze  HEART: Regular rate and rhythm; No murmurs, rubs, or gallops  ABDOMEN: Soft, Nontender, Nondistended; Bowel sounds present  EXTREMITIES:  2+ Peripheral Pulses, No clubbing, cyanosis, or edema  PSYCH: non verbal, spont movement of L extremities  SKIN: right upper thorax shiley in place, left groin ecchymosis    LABS:                        10.0   8.4   )-----------( 340      ( 06 Oct 2017 06:56 )             30.1     10-06    138  |  93<L>  |  98<H>  ----------------------------<  101<H>  5.8<H>   |  20<L>  |  8.46<H>    Ca    8.7      06 Oct 2017 06:56  Phos  8.3     10-06  Mg     2.5     10-06      PT/INR - ( 06 Oct 2017 06:56 )   PT: 21.1 sec;   INR: 1.93 ratio                   RADIOLOGY & ADDITIONAL TESTS:    Imaging Personally Reviewed:  Consultant(s) Notes Reviewed:    Care Discussed with Consultants/Other Providers:

## 2017-10-07 LAB
ANION GAP SERPL CALC-SCNC: 18 MMOL/L — HIGH (ref 5–17)
BUN SERPL-MCNC: 61 MG/DL — HIGH (ref 7–23)
CALCIUM SERPL-MCNC: 8.8 MG/DL — SIGNIFICANT CHANGE UP (ref 8.4–10.5)
CHLORIDE SERPL-SCNC: 100 MMOL/L — SIGNIFICANT CHANGE UP (ref 96–108)
CO2 SERPL-SCNC: 22 MMOL/L — SIGNIFICANT CHANGE UP (ref 22–31)
CREAT SERPL-MCNC: 6.01 MG/DL — HIGH (ref 0.5–1.3)
GLUCOSE SERPL-MCNC: 141 MG/DL — HIGH (ref 70–99)
HCT VFR BLD CALC: 28.6 % — LOW (ref 39–50)
HGB BLD-MCNC: 9.4 G/DL — LOW (ref 13–17)
INR BLD: 1.79 RATIO — HIGH (ref 0.88–1.16)
MAGNESIUM SERPL-MCNC: 2.3 MG/DL — SIGNIFICANT CHANGE UP (ref 1.6–2.6)
MCHC RBC-ENTMCNC: 30.1 PG — SIGNIFICANT CHANGE UP (ref 27–34)
MCHC RBC-ENTMCNC: 33 GM/DL — SIGNIFICANT CHANGE UP (ref 32–36)
MCV RBC AUTO: 91.2 FL — SIGNIFICANT CHANGE UP (ref 80–100)
PHOSPHATE SERPL-MCNC: 5.2 MG/DL — HIGH (ref 2.5–4.5)
PLATELET # BLD AUTO: 377 K/UL — SIGNIFICANT CHANGE UP (ref 150–400)
POTASSIUM SERPL-MCNC: 4.8 MMOL/L — SIGNIFICANT CHANGE UP (ref 3.5–5.3)
POTASSIUM SERPL-SCNC: 4.8 MMOL/L — SIGNIFICANT CHANGE UP (ref 3.5–5.3)
PROTHROM AB SERPL-ACNC: 19.7 SEC — HIGH (ref 9.8–12.7)
RBC # BLD: 3.14 M/UL — LOW (ref 4.2–5.8)
RBC # FLD: 14.6 % — HIGH (ref 10.3–14.5)
SODIUM SERPL-SCNC: 140 MMOL/L — SIGNIFICANT CHANGE UP (ref 135–145)
WBC # BLD: 7.5 K/UL — SIGNIFICANT CHANGE UP (ref 3.8–10.5)
WBC # FLD AUTO: 7.5 K/UL — SIGNIFICANT CHANGE UP (ref 3.8–10.5)

## 2017-10-07 PROCEDURE — 99232 SBSQ HOSP IP/OBS MODERATE 35: CPT

## 2017-10-07 RX ADMIN — Medication 5 MILLIGRAM(S): at 17:27

## 2017-10-07 RX ADMIN — AMIODARONE HYDROCHLORIDE 200 MILLIGRAM(S): 400 TABLET ORAL at 05:21

## 2017-10-07 RX ADMIN — Medication 1 TABLET(S): at 09:18

## 2017-10-07 RX ADMIN — Medication 1: at 17:30

## 2017-10-07 RX ADMIN — Medication 25 MILLIGRAM(S): at 05:21

## 2017-10-07 RX ADMIN — TAMSULOSIN HYDROCHLORIDE 0.4 MILLIGRAM(S): 0.4 CAPSULE ORAL at 21:37

## 2017-10-07 RX ADMIN — PANTOPRAZOLE SODIUM 40 MILLIGRAM(S): 20 TABLET, DELAYED RELEASE ORAL at 09:19

## 2017-10-07 RX ADMIN — Medication 1: at 13:36

## 2017-10-07 RX ADMIN — Medication 667 MILLIGRAM(S): at 17:27

## 2017-10-07 RX ADMIN — Medication 25 MILLIGRAM(S): at 17:27

## 2017-10-07 RX ADMIN — CLOPIDOGREL BISULFATE 75 MILLIGRAM(S): 75 TABLET, FILM COATED ORAL at 09:18

## 2017-10-07 RX ADMIN — Medication 667 MILLIGRAM(S): at 13:36

## 2017-10-07 RX ADMIN — Medication 81 MILLIGRAM(S): at 09:18

## 2017-10-07 RX ADMIN — Medication 667 MILLIGRAM(S): at 09:18

## 2017-10-07 RX ADMIN — Medication 5 MILLIGRAM(S): at 05:21

## 2017-10-07 RX ADMIN — ATORVASTATIN CALCIUM 40 MILLIGRAM(S): 80 TABLET, FILM COATED ORAL at 21:37

## 2017-10-07 NOTE — PROGRESS NOTE ADULT - SUBJECTIVE AND OBJECTIVE BOX
Patient is a 72y old  Male who presents with a chief complaint of s/p cardiac cath (22 Sep 2017 12:34)      SUBJECTIVE / OVERNIGHT EVENTS:  No acute events overnight. Patient is non-verbal but laying comfortably in bed.    REVIEW OF SYSTEMS:  -unable to obtain because patient is non-verbal    MEDICATIONS  (STANDING):  amiodarone    Tablet 200 milliGRAM(s) Oral daily  aspirin  chewable 81 milliGRAM(s) Oral daily  atorvastatin 40 milliGRAM(s) Oral at bedtime  busPIRone 5 milliGRAM(s) Oral two times a day  calcium acetate 667 milliGRAM(s) Oral three times a day with meals  clopidogrel Tablet 75 milliGRAM(s) Oral daily  dextrose 5%. 1000 milliLiter(s) (50 mL/Hr) IV Continuous <Continuous>  dextrose 50% Injectable 12.5 Gram(s) IV Push once  dextrose 50% Injectable 25 Gram(s) IV Push once  dextrose 50% Injectable 25 Gram(s) IV Push once  epoetin georgie Injectable 19873 Unit(s) IV Push every other day  insulin lispro (HumaLOG) corrective regimen sliding scale   SubCutaneous three times a day before meals  insulin lispro (HumaLOG) corrective regimen sliding scale   SubCutaneous at bedtime  metoprolol 25 milliGRAM(s) Oral two times a day  Nephro-sophie 1 Tablet(s) Oral daily  pantoprazole  Injectable 40 milliGRAM(s) IV Push daily  tamsulosin 0.4 milliGRAM(s) Oral at bedtime    MEDICATIONS  (PRN):  dextrose Gel 1 Dose(s) Oral once PRN Blood Glucose LESS THAN 70 milliGRAM(s)/deciLiter  glucagon  Injectable 1 milliGRAM(s) IntraMuscular once PRN Glucose <70 milliGRAM(s)/deciLiter      CAPILLARY BLOOD GLUCOSE  148 (07 Oct 2017 09:05)  165 (06 Oct 2017 21:21)  152 (06 Oct 2017 18:52)  122 (06 Oct 2017 15:40)  135 (06 Oct 2017 12:00)        I&O's Summary    06 Oct 2017 07:01  -  07 Oct 2017 07:00  --------------------------------------------------------  IN: 0 mL / OUT: 500 mL / NET: -500 mL    07 Oct 2017 07:01  -  07 Oct 2017 11:52  --------------------------------------------------------  IN: 480 mL / OUT: 0 mL / NET: 480 mL        PHYSICAL EXAM:  GENERAL: NAD, laying comfortably in bed  EYES:  PERRLA, conjunctiva and sclera clear  NECK: Supple, No JVD  CHEST/LUNG: Clear to auscultation bilaterally; No wheeze  HEART: Regular rate and rhythm; No murmurs, rubs, or gallops  ABDOMEN: Soft, Nontender, Nondistended; Bowel sounds present  EXTREMITIES:  No clubbing, cyanosis, or edema  PSYCH: unable to assess  NEUROLOGY: non-verbal; unable to assess  SKIN: No rashes or lesions    LABS:                        9.4    7.5   )-----------( 377      ( 07 Oct 2017 05:56 )             28.6     10-07    140  |  100  |  61<H>  ----------------------------<  141<H>  4.8   |  22  |  6.01<H>    Ca    8.8      07 Oct 2017 05:56  Phos  5.2     10-07  Mg     2.3     10-07      PT/INR - ( 07 Oct 2017 05:56 )   PT: 19.7 sec;   INR: 1.79 ratio         Tele - NSR             RADIOLOGY & ADDITIONAL TESTS:    Imaging Personally Reviewed:    Consultant(s) Notes Reviewed:  Cardiology, Nephrology    Care Discussed with Consultants/Other Providers:

## 2017-10-07 NOTE — PROGRESS NOTE ADULT - PROBLEM SELECTOR PLAN 3
-atrial fibrillation stable  - c/w amiodarone 200 QD per EP; continue metoprolol  - hold warfarin as patient is awaiting catheter placement; goal INR for now < 1.5

## 2017-10-07 NOTE — PROGRESS NOTE ADULT - PROBLEM SELECTOR PLAN 1
patient with CTH (9/23) demonstrating multifocal infarcts concerning for embolic strokes  - c/w aspirin, plavix, atorvastatin  - PT/OT/Neuro following

## 2017-10-07 NOTE — PROGRESS NOTE ADULT - PROBLEM SELECTOR PLAN 7
Patient with HbA1c of 6.5%  - FS ranging 160s-200s, given significant debility would not favor tighter control  - c/w Lispro SS

## 2017-10-07 NOTE — PROGRESS NOTE ADULT - PROBLEM SELECTOR PLAN 2
patient with new ESRD on HD  - needs transition to Permacath for HD access, as family would like to continue HD  - avoid nephrotoxins, renally dose meds  - c/w HD per nephrology; next HD on Monday  - awaiting placement of permacath on Monday by IR

## 2017-10-07 NOTE — PROGRESS NOTE ADULT - SUBJECTIVE AND OBJECTIVE BOX
Non-verbal post CVA    Vital Signs Last 24 Hrs  T(C): 37.5 (10-07-17 @ 13:15), Max: 37.5 (10-07-17 @ 13:15)  T(F): 99.5 (10-07-17 @ 13:15), Max: 99.5 (10-07-17 @ 13:15)  HR: 85 (10-07-17 @ 13:15) (81 - 96)  BP: 135/81 (10-07-17 @ 13:15) (106/62 - 135/81)  BP(mean): --  RR: 18 (10-07-17 @ 13:15) (18 - 18)  SpO2: 97% (10-07-17 @ 13:15) (95% - 98%)                                   9.4    7.5   )-----------( 377      ( 07 Oct 2017 05:56 )             28.6     07 Oct 2017 05:56    140    |  100    |  61     ----------------------------<  141    4.8     |  22     |  6.01     Ca    8.8        07 Oct 2017 05:56  Phos  5.2       07 Oct 2017 05:56  Mg     2.3       07 Oct 2017 05:56    General: NAD  Respiratory: good air entry  Cardiovascular: S1 S2   Gastrointestinal: soft, ND, BS present  Extremities:  no edema    amiodarone    Tablet 200 milliGRAM(s) Oral daily  aspirin  chewable 81 milliGRAM(s) Oral daily  atorvastatin 40 milliGRAM(s) Oral at bedtime  busPIRone 5 milliGRAM(s) Oral two times a day  calcium acetate 667 milliGRAM(s) Oral three times a day with meals  clopidogrel Tablet 75 milliGRAM(s) Oral daily  dextrose 5%. 1000 milliLiter(s) IV Continuous <Continuous>  dextrose 50% Injectable 12.5 Gram(s) IV Push once  dextrose 50% Injectable 25 Gram(s) IV Push once  dextrose 50% Injectable 25 Gram(s) IV Push once  dextrose Gel 1 Dose(s) Oral once PRN  epoetin georgie Injectable 92766 Unit(s) IV Push every other day  glucagon  Injectable 1 milliGRAM(s) IntraMuscular once PRN  insulin lispro (HumaLOG) corrective regimen sliding scale   SubCutaneous three times a day before meals  insulin lispro (HumaLOG) corrective regimen sliding scale   SubCutaneous at bedtime  metoprolol 25 milliGRAM(s) Oral two times a day  Nephro-sophie 1 Tablet(s) Oral daily  pantoprazole  Injectable 40 milliGRAM(s) IV Push daily  tamsulosin 0.4 milliGRAM(s) Oral at bedtime      Assessment and Plan:   		  CM, NSTEMI, s/p cath, PCI  S/p arrest, intubation  PAfib, s/p CVA  For PPM   S/p LIN on CKD in setting of non-fx L kidney and R hydro due to stone plus hemodynamic/dye injury  Started on HD in [facility]  Subsequently passed stone and hydro resolved, but no signs of renal fx recovery so far  On HD MWF  Procrit w/HD  Renal diet  While most likely pt will require long term dialysis, would still avoid ACE/ARB or other nephrotoxins for now  Perm cath with IR  D/c planning to rehab when ready  D/w brother at bedside

## 2017-10-07 NOTE — PROGRESS NOTE ADULT - ATTENDING COMMENTS
Agree with house staff plan of care.  Calm, resting, no acute SOB, HA, chest pain. Vitals stable  - IR will put perm cath once INR <1.5, held AC, will check INR daily. Daughter wanted HD  - c/w current meds, aspiration precaution  - HD per renal  - DNR/DNI. D/C planning to Acute/Subacute rehab with HD next week.

## 2017-10-07 NOTE — PROGRESS NOTE ADULT - PROBLEM SELECTOR PLAN 5
Patient initially presented with NSTEMI, s/p Impella-assisted Cath with BABAR x5 to LAD/RCA; complicated by subsequent PEA arrest  - c/w DAPT, statin, and beta-blocker  -  per renal, no plan to start ACE/ARB given CKD  - cards following

## 2017-10-08 LAB
ANION GAP SERPL CALC-SCNC: 22 MMOL/L — HIGH (ref 5–17)
BUN SERPL-MCNC: 92 MG/DL — HIGH (ref 7–23)
CALCIUM SERPL-MCNC: 9.2 MG/DL — SIGNIFICANT CHANGE UP (ref 8.4–10.5)
CHLORIDE SERPL-SCNC: 98 MMOL/L — SIGNIFICANT CHANGE UP (ref 96–108)
CO2 SERPL-SCNC: 19 MMOL/L — LOW (ref 22–31)
CREAT SERPL-MCNC: 8.33 MG/DL — HIGH (ref 0.5–1.3)
GLUCOSE SERPL-MCNC: 118 MG/DL — HIGH (ref 70–99)
HCT VFR BLD CALC: 30.4 % — LOW (ref 39–50)
HGB BLD-MCNC: 9.8 G/DL — LOW (ref 13–17)
INR BLD: 1.35 RATIO — HIGH (ref 0.88–1.16)
MAGNESIUM SERPL-MCNC: 2.3 MG/DL — SIGNIFICANT CHANGE UP (ref 1.6–2.6)
MCHC RBC-ENTMCNC: 31 PG — SIGNIFICANT CHANGE UP (ref 27–34)
MCHC RBC-ENTMCNC: 32.4 GM/DL — SIGNIFICANT CHANGE UP (ref 32–36)
MCV RBC AUTO: 95.8 FL — SIGNIFICANT CHANGE UP (ref 80–100)
PHOSPHATE SERPL-MCNC: 7.1 MG/DL — HIGH (ref 2.5–4.5)
PLATELET # BLD AUTO: 387 K/UL — SIGNIFICANT CHANGE UP (ref 150–400)
POTASSIUM SERPL-MCNC: 4.9 MMOL/L — SIGNIFICANT CHANGE UP (ref 3.5–5.3)
POTASSIUM SERPL-SCNC: 4.9 MMOL/L — SIGNIFICANT CHANGE UP (ref 3.5–5.3)
PROTHROM AB SERPL-ACNC: 14.7 SEC — HIGH (ref 9.8–12.7)
RBC # BLD: 3.18 M/UL — LOW (ref 4.2–5.8)
RBC # FLD: 14.6 % — HIGH (ref 10.3–14.5)
SODIUM SERPL-SCNC: 139 MMOL/L — SIGNIFICANT CHANGE UP (ref 135–145)
WBC # BLD: 6.9 K/UL — SIGNIFICANT CHANGE UP (ref 3.8–10.5)
WBC # FLD AUTO: 6.9 K/UL — SIGNIFICANT CHANGE UP (ref 3.8–10.5)

## 2017-10-08 PROCEDURE — 99233 SBSQ HOSP IP/OBS HIGH 50: CPT | Mod: GC

## 2017-10-08 RX ADMIN — Medication 667 MILLIGRAM(S): at 09:17

## 2017-10-08 RX ADMIN — Medication 667 MILLIGRAM(S): at 13:09

## 2017-10-08 RX ADMIN — Medication 25 MILLIGRAM(S): at 17:20

## 2017-10-08 RX ADMIN — Medication 5 MILLIGRAM(S): at 05:20

## 2017-10-08 RX ADMIN — Medication 81 MILLIGRAM(S): at 09:17

## 2017-10-08 RX ADMIN — AMIODARONE HYDROCHLORIDE 200 MILLIGRAM(S): 400 TABLET ORAL at 05:20

## 2017-10-08 RX ADMIN — Medication 1 TABLET(S): at 09:17

## 2017-10-08 RX ADMIN — Medication 667 MILLIGRAM(S): at 17:20

## 2017-10-08 RX ADMIN — Medication 5 MILLIGRAM(S): at 17:20

## 2017-10-08 RX ADMIN — PANTOPRAZOLE SODIUM 40 MILLIGRAM(S): 20 TABLET, DELAYED RELEASE ORAL at 09:17

## 2017-10-08 RX ADMIN — TAMSULOSIN HYDROCHLORIDE 0.4 MILLIGRAM(S): 0.4 CAPSULE ORAL at 21:38

## 2017-10-08 RX ADMIN — Medication 1: at 09:18

## 2017-10-08 RX ADMIN — Medication 25 MILLIGRAM(S): at 05:20

## 2017-10-08 RX ADMIN — ATORVASTATIN CALCIUM 40 MILLIGRAM(S): 80 TABLET, FILM COATED ORAL at 21:38

## 2017-10-08 RX ADMIN — CLOPIDOGREL BISULFATE 75 MILLIGRAM(S): 75 TABLET, FILM COATED ORAL at 09:17

## 2017-10-08 NOTE — PROVIDER CONTACT NOTE (OTHER) - ASSESSMENT
MAP remains >60, pt tolerates full vent support
Patient on patient specific heparin drip with 2 therapeutic results (PTT between 50-70). Order states to collect PTT daily after 2 therapeutic results.  1945 PTT = 57  0223 PTT = 56.9  0709 PTT = 55.2
Pt asymptomatic, VSS, upon reviewing the chart with MD it is noted that an EKG was done when pt was in dialysis showing that he was in AFIB. Pt noted to have PAF yesterday as well.
FS = 202, BP = 144/82, HR 80s-90s sinus rhythm on monitor
Patient non-verbal, unable to assess mental status. Left wrist restraint DC'd at 0930 as patient was calm. Patient partially removed NGT; anchoring device found removed with tube pulled back about 10cm. NGT shut off immediately; patient appearing in no apparent respiratory distress. Lung sounds clear to auscultation. NGT re-anchored with additional securement device.
Patient nonverbal, arouses to voice and touch. No s/s of chest pain or shortness of breath noted. NSR on cardiac monitor with . VSS.
Pt c/o of sob, chest pain, and chest tightness. VSS. /82; T 98.1; HR82; O2 95% RR20
Pt is A&Ox4 [place] speaking refusing  phone but able to make needs met. Denies any pain or discomfort, VSS.
Pt is A&Ox4 denies pain/ discomfort. Refusing to use  phone at this time but able to make needs met in English. Basic metabolic panel scheduled for 09/19/17 at 06:00.
Pt nonverbal, VSS.
Pt. Alert & nonverbal. VSS. Pt to receive metoprolol at 1800. No s/s of pain, discomfot, or distress. Asymptomatic
Upon assessment, patient is resting in bed, disgruntled, refusing blood draws, stating he will have them "tomorrow".
Upon assessment, patient noted to have had moderate amount of BRBPR in commode bucket after BM.  As per night shift RNDinora, this is the first BM that he had with blood in it.  No active bleeding noted elsewhere.
VSS. RR 23; SPO2 90%-94% on nonrebreather.
blood tinged sputum and blood tinged urine
no respiratory distress noted at this time
no signs of bleeding
pt alert but nonverbal. pt seems comfortable. no particular expression of pain or discomfort.
pt asymptomatic
pt follows commands Aisha Oh at bedside speaking Russiam to pt, MAP remains >60
pt remains NPO, enteral feeds on hold not started as per Dr. Feliciano
pt tolerating full vent support, MAP remains >60
pt. stable. pt. incontinent of urine

## 2017-10-08 NOTE — PROGRESS NOTE ADULT - SUBJECTIVE AND OBJECTIVE BOX
Resting    Vital Signs Last 24 Hrs  T(C): 37.1 (10-08-17 @ 20:50), Max: 37.1 (10-08-17 @ 20:50)  T(F): 98.8 (10-08-17 @ 20:50), Max: 98.8 (10-08-17 @ 20:50)  HR: 79 (10-08-17 @ 20:50) (71 - 83)  BP: 135/77 (10-08-17 @ 20:50) (113/72 - 156/81)  BP(mean): --  RR: 18 (10-08-17 @ 20:50) (18 - 18)  SpO2: 97% (10-08-17 @ 20:50) (97% - 100%)                                   9.8    6.9   )-----------( 387      ( 08 Oct 2017 06:42 )             30.4     08 Oct 2017 06:42    139    |  98     |  92     ----------------------------<  118    4.9     |  19     |  8.33     Ca    9.2        08 Oct 2017 06:42  Phos  7.1       08 Oct 2017 06:42  Mg     2.3       08 Oct 2017 06:42    Respiratory: good air entry  Cardiovascular: S1 S2   Gastrointestinal: soft, ND, BS present  Extremities:  no edema    amiodarone    Tablet 200 milliGRAM(s) Oral daily  aspirin  chewable 81 milliGRAM(s) Oral daily  atorvastatin 40 milliGRAM(s) Oral at bedtime  busPIRone 5 milliGRAM(s) Oral two times a day  calcium acetate 667 milliGRAM(s) Oral three times a day with meals  clopidogrel Tablet 75 milliGRAM(s) Oral daily  dextrose 5%. 1000 milliLiter(s) IV Continuous <Continuous>  dextrose 50% Injectable 12.5 Gram(s) IV Push once  dextrose 50% Injectable 25 Gram(s) IV Push once  dextrose 50% Injectable 25 Gram(s) IV Push once  dextrose Gel 1 Dose(s) Oral once PRN  epoetin georgie Injectable 41058 Unit(s) IV Push every other day  glucagon  Injectable 1 milliGRAM(s) IntraMuscular once PRN  insulin lispro (HumaLOG) corrective regimen sliding scale   SubCutaneous three times a day before meals  insulin lispro (HumaLOG) corrective regimen sliding scale   SubCutaneous at bedtime  metoprolol 25 milliGRAM(s) Oral two times a day  Nephro-sophie 1 Tablet(s) Oral daily  pantoprazole  Injectable 40 milliGRAM(s) IV Push daily  tamsulosin 0.4 milliGRAM(s) Oral at bedtime        Assessment and Plan:   		  CM, NSTEMI, s/p cath, PCI  S/p arrest, intubation  PAfib, s/p CVA  For PPM   S/p LIN on CKD in setting of non-fx L kidney and R hydro due to stone plus hemodynamic/dye injury  Started on HD in [facility]  Subsequently passed stone and hydro resolved, but no signs of renal fx recovery so far  On HD MWF  Procrit w/HD  Renal diet  While most likely pt will require long term dialysis, would still avoid ACE/ARB or other nephrotoxins for now  Perm cath with IR  D/c planning to rehab when ready

## 2017-10-08 NOTE — PROVIDER CONTACT NOTE (OTHER) - RECOMMENDATIONS
as per pharmacy Vancomycin and meropenem should be given after HD on dialysis days
MD to review PTT results.
?Cardiology consult, ?need for BB now
12 Lead EKG, Give due dose of Lopressor
Assess pt review PMH.
MD Jaramillo made aware. EKG? Trops?
MD Jaramillo made aware. Hold Heparin SQ?
MD contacted and aware.
MD to clarify if patient to go to dialysis now or if he should be held for NGT assessment.
Mamie Lima MD made aware.
Mamie Lima MD made aware.
catheterize patient for urine?
consider CXR to confirm OGT placement
consider cpap trial
consider tylenol, LFTs elevated
continue heparin gtt. rate at 6 cc/hr?
continue to monitor
oxygen 3 LN placed, oxygen 95 % on 3 LN
will continue to monitor
will continue to monitor

## 2017-10-08 NOTE — PROVIDER CONTACT NOTE (OTHER) - BACKGROUND
Afib, s/p cardiac arrest, intubated, s/p CVA, CKD on HD, febrile today
Patient admitted for NSTEMI
Pt admitted with NSTEMI, ARF, CHF, with new dialysis with history of CVA, HTN, kidney stones.
Admitted for NSTEMI
DX:NSTEMI  PMH: CA, COPD, BP, bipolar affective disorder, CKD, pancreatitis, hypertension, dyslipidemia, diabetes mellitus, Coronary artery disease.
Patient admitted for NSTEMI
Patient admitted for NSTEMI
Patient admitted for NSTEMI.  PMH of CVA, COPD, BPH, Bipolar affective disorder, CKD, Pancreatitis, HTN, Dyslipidemia, Diabetes Mellitus, CAD.
Patient admitted for NSTEMI.  PMH of CVA, COPD, BPH, bipolar affective disorder, CKD, Pancreatitis, HTN, dyslipidemia, Diabetes mellitus.
Patient admitted on 9/12/17 for NSTEMI. Pt s/p cath 912/17.
Pt admitted for NSTEMI
Pt admitted for NSTEMI. PMH- CVA, COPD, CKD, HTN, DM
Pt admitted for Non-ST elevation myocardial infraction. Pts problem dx acute kidney injury superimposed on CKD.
Pt admitted for non-ST elevation myocardial infarction. Pts problem dx acute kidney injury superimposed on CKD.
Pt admitted with NSTEMI PMH of CVA, HTN, Pancreatitis, pt had previous episodes of afib which converted into NSR. Pt is nonverbal. Pt is currently DNR.
admitted with NSTEMI
admitted with NSTEMI. ARF
afib- sr   hx cva
intubated, s/p cardiac arrest, on HD, s/p cardiac stents, afib, elevated LFTs
s/p CVA, right LE and right UE no movement, s/p cardiac arrest, intubated, pt remains off sedation
s/p cardiac arrest, ESRD

## 2017-10-08 NOTE — PROGRESS NOTE ADULT - PROBLEM SELECTOR PLAN 1
patient with new ESRD on HD, c/b hyperkalemia   - needs transition to Permacath for HD access, as family would like to continue HD  - avoid nephrotoxins, renally dose meds  - c/w HD per nephrology; next HD on Monday  - awaiting placement of permacath on Monday by IR, advised INR  must be <1.5  - INR at target for procedure

## 2017-10-08 NOTE — PROGRESS NOTE ADULT - ATTENDING COMMENTS
Patient seen and examined by me. Discussed with housestaff and agree with the residents findings and plan as documented in the resident note, with the following revision(s) made as necessary:    pt calm today  awaiting permacath placement likely tomorrow, keep inr < 1.5  cont HD, dnr dni

## 2017-10-08 NOTE — PROVIDER CONTACT NOTE (OTHER) - REASON
ABG results
BRBPR
Ectopy
OGT advanced
Patient c/o sob on nonrebreather
Patient converted from atrial fibrillation to NSR
Patient partially removed NGT; pending transport to dialysis.
Patient refusing blood draws
Potassium 6.1
Pt c/o of sob, chest pain, and chest tightness. VSS
Pt has blood tinged sputum and blood tinged urine. Pt has heparin SQ this AM
UA ordered, pt. is incontinent, catheterize patient for urine?
Urine Output 60 ml during shift
febrile 101 F rectally
hr down to 42 for first time
patient noted to have abdominal breathing and diaphoretic. pox = 91% on RA
pt afib 
pt follows commands
pt received from HD in afib
ptt.  = 62.7
vancomycin and meropenem IVSS doses due now, pt pending HD today
PTT results for patient specific heparin drip
Pt noted to be in afib 100-120

## 2017-10-08 NOTE — PROVIDER CONTACT NOTE (OTHER) - DATE AND TIME:
21-Sep-2017 03:30
03-Oct-2017 02:10
03-Oct-2017 02:55
04-Oct-2017 18:00
08-Oct-2017 08:31
15-Sep-2017 23:22
16-Sep-2017 06:20
16-Sep-2017 12:00
18-Sep-2017 23:10
19-Sep-2017 04:54
19-Sep-2017 15:30
20-Sep-2017 07:20
23-Sep-2017 08:03
24-Sep-2017 10:26
24-Sep-2017 15:43
28-Sep-2017 09:58
29-Sep-2017 20:30
30-Sep-2017 00:00
30-Sep-2017 02:30
30-Sep-2017 05:30
30-Sep-2017 15:45
23-Sep-2017 14:40
28-Sep-2017 08:00

## 2017-10-08 NOTE — PROGRESS NOTE ADULT - PROBLEM SELECTOR PLAN 3
patient with CTH (9/23) demonstrating multifocal infarcts concerning for embolic strokes  - c/w aspirin, plavix, atorvastatin  - PT/OT following

## 2017-10-08 NOTE — PROGRESS NOTE ADULT - PROBLEM SELECTOR PLAN 2
Patient with worsening systolic dysfunction s/p NSTEMI/arrest  - as with family GOC, EP can place single lead PPM when ready  - c/w fluid removal by intermittent HD  - c/w metoprolol 25mg BID  -cardio following

## 2017-10-08 NOTE — PROVIDER CONTACT NOTE (OTHER) - NAME OF MD/NP/PA/DO NOTIFIED:
Dr Burnett
Dr. Avalos
Dr. Deysi Fry
Dr. Feliciano
Dr. Leonard
Dr. Lima [phone]
Emeterio Berry MD
MD Deysi Fry
MD Deysi Fry
MD Jaramillo
MD Jaramillo
MD Jay Team 4
MD Kristi Storm
Mamie Lima MD
Mamie Lima MD
Team 4, MD Goss
lOi Goss MD
Dr. Avalos
Oli Goss MD
Dr Fry

## 2017-10-08 NOTE — PROVIDER CONTACT NOTE (OTHER) - SITUATION
PTT results for patient specific heparin drip
pt in new afib, asymptomatic, VSS,
ABG results
OGT checked for placement and no audible confirmation obtained, OGT advanced with audible confirmation
Patient c/o sob on nonrebreather
Patient converted from atrial fibrillation to NSR
Patient due to have BMP rechecked post Potassium lowering treatment.  Patient is refusing blood draws at this time.
Patient noted to have moderate amount of BRBPR after BM for the first time.
Patient partially removed NGT; pending transport to \[facility]\"".
Pt c/o of sob, chest pain, and chest tightness. VSS
Pt febrile 101 F rectally
Pt has blood tinged sputum and blood tinged urine. Pt due heparin SQ this AM
Pt has james catheter in place, only 60 ml of urine put out during shift. Pt received dialysis today.
Pt returned from dialysis and was placed on tele to be discovered in the A-fib.
Pts potassium resulted at 09:36 as 6.1. Pt received dialysis today.
UA ordered, pt. is incontinent, catheterize patient for urine?
hr down to 42 on tele monitor for first time
patient noted to have abdominal breathing and diaphoretic
pt afib  106/78  hr 90  pox 99 %  room air
pt is able to follow commands, pt squeezed left hand on command, pt took a deep breath on command, pt instructed to lift his head off pillow and pt followed command, Dr. Leonard at bedside
pt received from HD in afib
ptt.  = 62.7
vancomycin and meropenem IVSS doses due now, pt pending HD today

## 2017-10-08 NOTE — PROVIDER CONTACT NOTE (OTHER) - ACTION/TREATMENT ORDERED:
okay to give Vancomycin and meropenem after HD today
MD made aware. MD stated to maintain heparin drip at current rate. MD will order PTT for daily collection on 9/29
Labs ordered by MD, EKG ordered/performed, plan for cardiology consult. Per MD give scheduled metoprolol this morning. -110
MD Fry aware, no further interventions at this time will continue to monitor pt.
MD aware awaiting further lab orders at this time, will continue to monitor pt.
MD made aware. No further actions at this time. Continue to monitor patient.
12 Lead EKG, Give due dose of Lopressor. will continue to monitor and maintain safety.
ABG stat
CXR ordered
MD Jaramillo made aware. As per MD Jaramillo EKG and labs. Will continue to monitor
MD Jaramillo made aware. As per MD Jaramillo hold Heparin SQ this AM. Will continue to monitor
MD aware, will continue to monitor.
MD contacted & aware. Stat chest x ray completed. Dialysis contacted and scheduled; pt off tele for dialysis.
MD made aware; MD stated to send patient to dialysis. MD to evaluate NGT upon return to the floor.
MD to discuss with day team
MD to speak with patient at bedside. Will continue to monitor.
No new orders noted at this time.  MD to assess patient at bedside.  Will continue to closely monitor.
VS- 75hr, 133/80, 18, 99% RA.
continue heparin gtt. rate at 6 cc/hr, ptt. in AM
continue to monitor
cpap trial as tolerated
no new orders at this time
urine analysis and urine culture

## 2017-10-09 LAB
ANION GAP SERPL CALC-SCNC: 23 MMOL/L — HIGH (ref 5–17)
BASOPHILS # BLD AUTO: 0.1 K/UL — SIGNIFICANT CHANGE UP (ref 0–0.2)
BASOPHILS NFR BLD AUTO: 0.9 % — SIGNIFICANT CHANGE UP (ref 0–2)
BUN SERPL-MCNC: 116 MG/DL — HIGH (ref 7–23)
CALCIUM SERPL-MCNC: 9.1 MG/DL — SIGNIFICANT CHANGE UP (ref 8.4–10.5)
CHLORIDE SERPL-SCNC: 97 MMOL/L — SIGNIFICANT CHANGE UP (ref 96–108)
CO2 SERPL-SCNC: 20 MMOL/L — LOW (ref 22–31)
CREAT SERPL-MCNC: 10.11 MG/DL — HIGH (ref 0.5–1.3)
EOSINOPHIL # BLD AUTO: 0.2 K/UL — SIGNIFICANT CHANGE UP (ref 0–0.5)
EOSINOPHIL NFR BLD AUTO: 3.1 % — SIGNIFICANT CHANGE UP (ref 0–6)
GLUCOSE SERPL-MCNC: 125 MG/DL — HIGH (ref 70–99)
HCT VFR BLD CALC: 28.1 % — LOW (ref 39–50)
HGB BLD-MCNC: 9.3 G/DL — LOW (ref 13–17)
INR BLD: 1.33 RATIO — HIGH (ref 0.88–1.16)
LYMPHOCYTES # BLD AUTO: 1.6 K/UL — SIGNIFICANT CHANGE UP (ref 1–3.3)
LYMPHOCYTES # BLD AUTO: 21 % — SIGNIFICANT CHANGE UP (ref 13–44)
MAGNESIUM SERPL-MCNC: 2.7 MG/DL — HIGH (ref 1.6–2.6)
MCHC RBC-ENTMCNC: 30.1 PG — SIGNIFICANT CHANGE UP (ref 27–34)
MCHC RBC-ENTMCNC: 33.1 GM/DL — SIGNIFICANT CHANGE UP (ref 32–36)
MCV RBC AUTO: 91.1 FL — SIGNIFICANT CHANGE UP (ref 80–100)
MONOCYTES # BLD AUTO: 0.5 K/UL — SIGNIFICANT CHANGE UP (ref 0–0.9)
MONOCYTES NFR BLD AUTO: 7.1 % — SIGNIFICANT CHANGE UP (ref 2–14)
NEUTROPHILS # BLD AUTO: 5 K/UL — SIGNIFICANT CHANGE UP (ref 1.8–7.4)
NEUTROPHILS NFR BLD AUTO: 67.9 % — SIGNIFICANT CHANGE UP (ref 43–77)
PHOSPHATE SERPL-MCNC: 8.2 MG/DL — HIGH (ref 2.5–4.5)
PLATELET # BLD AUTO: 399 K/UL — SIGNIFICANT CHANGE UP (ref 150–400)
POTASSIUM SERPL-MCNC: 5.4 MMOL/L — HIGH (ref 3.5–5.3)
POTASSIUM SERPL-SCNC: 5.4 MMOL/L — HIGH (ref 3.5–5.3)
PROTHROM AB SERPL-ACNC: 14.6 SEC — HIGH (ref 9.8–12.7)
RBC # BLD: 3.08 M/UL — LOW (ref 4.2–5.8)
RBC # FLD: 14.4 % — SIGNIFICANT CHANGE UP (ref 10.3–14.5)
SODIUM SERPL-SCNC: 140 MMOL/L — SIGNIFICANT CHANGE UP (ref 135–145)
WBC # BLD: 7.4 K/UL — SIGNIFICANT CHANGE UP (ref 3.8–10.5)
WBC # FLD AUTO: 7.4 K/UL — SIGNIFICANT CHANGE UP (ref 3.8–10.5)

## 2017-10-09 PROCEDURE — 99232 SBSQ HOSP IP/OBS MODERATE 35: CPT

## 2017-10-09 PROCEDURE — 36558 INSERT TUNNELED CV CATH: CPT

## 2017-10-09 PROCEDURE — 76937 US GUIDE VASCULAR ACCESS: CPT | Mod: 26

## 2017-10-09 PROCEDURE — 77001 FLUOROGUIDE FOR VEIN DEVICE: CPT | Mod: 26

## 2017-10-09 RX ORDER — WARFARIN SODIUM 2.5 MG/1
2 TABLET ORAL ONCE
Qty: 0 | Refills: 0 | Status: COMPLETED | OUTPATIENT
Start: 2017-10-09 | End: 2017-10-09

## 2017-10-09 RX ADMIN — Medication 5 MILLIGRAM(S): at 17:50

## 2017-10-09 RX ADMIN — PANTOPRAZOLE SODIUM 40 MILLIGRAM(S): 20 TABLET, DELAYED RELEASE ORAL at 17:49

## 2017-10-09 RX ADMIN — Medication 25 MILLIGRAM(S): at 17:50

## 2017-10-09 RX ADMIN — WARFARIN SODIUM 2 MILLIGRAM(S): 2.5 TABLET ORAL at 22:42

## 2017-10-09 RX ADMIN — Medication 25 MILLIGRAM(S): at 05:46

## 2017-10-09 RX ADMIN — AMIODARONE HYDROCHLORIDE 200 MILLIGRAM(S): 400 TABLET ORAL at 05:46

## 2017-10-09 RX ADMIN — TAMSULOSIN HYDROCHLORIDE 0.4 MILLIGRAM(S): 0.4 CAPSULE ORAL at 22:42

## 2017-10-09 RX ADMIN — ERYTHROPOIETIN 10000 UNIT(S): 10000 INJECTION, SOLUTION INTRAVENOUS; SUBCUTANEOUS at 19:11

## 2017-10-09 RX ADMIN — Medication 667 MILLIGRAM(S): at 17:50

## 2017-10-09 RX ADMIN — Medication 1: at 10:13

## 2017-10-09 RX ADMIN — Medication 5 MILLIGRAM(S): at 05:46

## 2017-10-09 RX ADMIN — ATORVASTATIN CALCIUM 40 MILLIGRAM(S): 80 TABLET, FILM COATED ORAL at 22:42

## 2017-10-09 RX ADMIN — Medication 81 MILLIGRAM(S): at 17:50

## 2017-10-09 RX ADMIN — Medication 1 TABLET(S): at 17:50

## 2017-10-09 RX ADMIN — CLOPIDOGREL BISULFATE 75 MILLIGRAM(S): 75 TABLET, FILM COATED ORAL at 17:49

## 2017-10-09 NOTE — PROGRESS NOTE ADULT - SUBJECTIVE AND OBJECTIVE BOX
Non distress, non-verbal    Vital Signs Last 24 Hrs  T(C): 37.2 (10-09-17 @ 12:20), Max: 37.2 (10-09-17 @ 04:20)  T(F): 98.9 (10-09-17 @ 12:20), Max: 98.9 (10-09-17 @ 04:20)  HR: 81 (10-09-17 @ 12:20) (79 - 81)  BP: 131/74 (10-09-17 @ 12:20) (110/73 - 135/77)  BP(mean): --  RR: 17 (10-09-17 @ 12:20) (17 - 18)  SpO2: 98% (10-09-17 @ 12:20) (97% - 98%)             amiodarone    Tablet 200 milliGRAM(s) Oral daily  aspirin  chewable 81 milliGRAM(s) Oral daily  atorvastatin 40 milliGRAM(s) Oral at bedtime  busPIRone 5 milliGRAM(s) Oral two times a day  calcium acetate 667 milliGRAM(s) Oral three times a day with meals  clopidogrel Tablet 75 milliGRAM(s) Oral daily  dextrose 5%. 1000 milliLiter(s) IV Continuous <Continuous>  dextrose 50% Injectable 12.5 Gram(s) IV Push once  dextrose 50% Injectable 25 Gram(s) IV Push once  dextrose 50% Injectable 25 Gram(s) IV Push once  dextrose Gel 1 Dose(s) Oral once PRN  epoetin georgie Injectable 63046 Unit(s) IV Push every other day  glucagon  Injectable 1 milliGRAM(s) IntraMuscular once PRN  insulin lispro (HumaLOG) corrective regimen sliding scale   SubCutaneous three times a day before meals  insulin lispro (HumaLOG) corrective regimen sliding scale   SubCutaneous at bedtime  metoprolol 25 milliGRAM(s) Oral two times a day  Nephro-sophie 1 Tablet(s) Oral daily  pantoprazole  Injectable 40 milliGRAM(s) IV Push daily  tamsulosin 0.4 milliGRAM(s) Oral at bedtime  warfarin 2 milliGRAM(s) Oral once    Respiratory: good air entry  Cardiovascular: S1 S2   Gastrointestinal: soft, ND, BS present  Extremities:  no edema                          9.3    7.4   )-----------( 399      ( 09 Oct 2017 05:37 )             28.1     09 Oct 2017 05:37    140    |  97     |  116    ----------------------------<  125    5.4     |  20     |  10.11    Ca    9.1        09 Oct 2017 05:37  Phos  8.2       09 Oct 2017 05:37  Mg     2.7       09 Oct 2017 05:37      Assessment and Plan:   		  CM, NSTEMI, s/p cath, PCI  S/p arrest, intubation  PAfib, s/p CVA  For PPM   S/p LIN on CKD in setting of non-fx L kidney and R hydro due to stone plus hemodynamic/dye injury  Started on HD in [facility]  Subsequently passed stone and hydro resolved, but no signs of renal fx recovery so far  On HD MWF  Procrit w/HD  Renal diet  While most likely pt will require long term dialysis, would still avoid ACE/ARB or other nephrotoxins for now  Perm cath with IR today, HD later today once perm cath is in place  D/c planning to rehab when ready

## 2017-10-09 NOTE — PROGRESS NOTE ADULT - ASSESSMENT
73 yo M w/PMH of extensive CAD, T2DM, CKD, prior CVA transferred from OSH for NSTEMI s/p Impella-assisted LHC w/BABAR x 5 and obstructive nephropathy requiring HD transferred from CCU after PEA/Vtach arrest with ROSC c/b new embolic CVA. Pt remains nonverbal, with right hemiplegia, receiving HD MWF,  permacath by IR today (INR 10/9 1.33)    CVS  1.  NSTEMI (non-ST elevated myocardial infarction).  Plan: Patient initially presented with NSTEMI, s/p Impella-assisted Cath with BABAR x5 to LAD/RCA; complicated by subsequent PEA arrest  	- c/w DAPT, statin, and beta-blocker  	-  per renal, no plan to start ACE/ARB given CKD  2. Acute systolic congestive heart failure.  Plan: Patient with worsening systolic dysfunction s/p NSTEMI/arrest  	- as with family GOC, EP can place single lead PPM when ready  	- c/w fluid removal by intermittent HD; permacath by IR today  	- c/w metoprolol 25mg BID  3. CVA (cerebral vascular accident).  Plan: patient with CTH (9/23) demonstrating multifocal infarcts concerning for embolic strokes  	- c/w DAPT  	 -c/w atorvastatin  	- PT/OT following  	- right hemiplegia, can follow commands with left extremities.   4. Atrial fibrillation and flutter.  Plan: -atrial fibrillation stable; INR today 1.33  	- c/w amiodarone 200 QD per EP;   	- c/w continue metoprolol  	- warfarin held for goal INR < 1.5 (1.3 ideal) for catheter placement     Pulmonology  1. COPD (chronic obstructive pulmonary disease).  Plan: - stable on room air  - c/w DuoNebs PRN, incentive spirometer.     Nephrology   1. ESRD (end stage renal disease).  Plan: patient with new ESRD on HD, c/b hyperkalemia   	-permacath will be placed today by IR  	- avoid nephrotoxins, renally dose meds  	- c/w HD per nephrology - MWF.    Gastrointestinal   1. Dysphagia. Plan: Patient with new dysphagia in the setting of CVA  	- honey nectar consistency diet  	    Endocrinology  1. Type 2 diabetes mellitus.  Plan: Patient with HbA1c of 6.5%  	- serum glucose 125, given significant debility would not favor tighter control  	- c/w Lispro SS.     Code: DNR/DNI  DVT PPx: None because permacath will be placed today.  On Coumadin but INR too high for permacath placement.   Diet: Honey nectar consistency diet 71 yo M w/PMH of extensive CAD, T2DM, CKD, prior CVA.  Transferred from OSH for NSTEMI, s/p Impella-assisted LHC w/BABAR x 5.  Obstructive nephropathy, requiring HD   PEA/V Tach arrest with ROSC c/b new embolic CVA.   Pt remains nonverbal, with right hemiplegia, receiving HD MWF,  permacath by IR today (INR 10/9 1.33)    Cardiovascular-  1.  NSTEMI (non-ST elevated MI) Plan: Patient presented with NSTEMI, s/p Impella-assisted Cath with BABAR x5 to LAD/RCA; complicated by subsequent PEA arrest  	- c/w DAPT, statin, and beta-blocker  	- per renal, no plan to start ACE/ARB given CKD    2. Acute systolic congestive heart failure.  Plan: Patient with worsening systolic dysfunction s/p NSTEMI/arrest  	- as with family GOC, EP can place single lead PPM when ready  	- c/w fluid removal by intermittent HD; permacath by IR today  	- c/w metoprolol 25mg BID    3. CVA (cerebral vascular accident).  Plan: patient with CTH (9/23) demonstrating multifocal infarcts concerning for embolic strokes  	- c/w DAPT  	 -c/w atorvastatin  	- PT/OT following  	- right hemiplegia, can follow commands with left extremities.     4. Atrial fibrillation and flutter.  Plan: -atrial fibrillation stable; INR today 1.33  	- c/w amiodarone 200 QD per EP;   	- c/w continue metoprolol  	- warfarin held for goal INR < 1.5 (1.3 ideal) for catheter placement     Pulmonology  1. COPD (chronic obstructive pulmonary disease).  Plan: - stable on room air  - c/w DuoNebs PRN, incentive spirometer.     Nephrology   1. ESRD (end stage renal disease).  Plan: patient with new ESRD on HD, c/b hyperkalemia   	-permacath will be placed today by IR  	- avoid nephrotoxins, renally dose meds  	- c/w HD per nephrology - MWF.    Gastrointestinal   1. Dysphagia. Plan: Patient with new dysphagia in the setting of CVA  	- honey nectar consistency diet  	  Endocrinology  1. Type 2 diabetes mellitus.  Plan: Patient with HbA1c of 6.5%  	- serum glucose 125, given significant debility would not favor tighter control  	- c/w Lispro SS.     Code: DNR/DNI  DVT PPx: None because permacath will be placed today.  On Coumadin but INR too high for permacath placement.   Diet: Honey nectar consistency diet      MONSERRAT Montano, med student    Erick Roy M.D.  Cardiology Consult Service  [phone] or [phone]

## 2017-10-09 NOTE — PROGRESS NOTE ADULT - PROBLEM SELECTOR PLAN 3
patient with CTH (9/23) demonstrating multifocal infarcts concerning for embolic strokes  - c/w aspirin, plavix, atorvastatin  - PT/OT following  - right hemiplegia, can follow commands with left extremities.

## 2017-10-09 NOTE — PROGRESS NOTE ADULT - ATTENDING COMMENTS
Agree with R1/R2 resident plan of care  Unchanged status, little more alert  - Scheduled to have perm cath as INR <1.5 today  - Cardiology to decide with family if they agrees with PPM prior to DC  - Renal f/u for HD  - Resume coumadin tonight, maintain INR 2-3  - c/w other meds.  - DNR/DNI

## 2017-10-09 NOTE — PROGRESS NOTE ADULT - SUBJECTIVE AND OBJECTIVE BOX
Patient is a 72y old  Male who presents with a chief complaint of s/p cardiac cath (22 Sep 2017 12:34)        SUBJECTIVE / OVERNIGHT EVENTS: [place] ([employer]#[id number]) service used. Pt remains non verbal. Responded to command of LUE movement.  No events on tele.       MEDICATIONS  (STANDING):  amiodarone    Tablet 200 milliGRAM(s) Oral daily  aspirin  chewable 81 milliGRAM(s) Oral daily  atorvastatin 40 milliGRAM(s) Oral at bedtime  busPIRone 5 milliGRAM(s) Oral two times a day  calcium acetate 667 milliGRAM(s) Oral three times a day with meals  clopidogrel Tablet 75 milliGRAM(s) Oral daily  dextrose 5%. 1000 milliLiter(s) (50 mL/Hr) IV Continuous <Continuous>  dextrose 50% Injectable 12.5 Gram(s) IV Push once  dextrose 50% Injectable 25 Gram(s) IV Push once  dextrose 50% Injectable 25 Gram(s) IV Push once  epoetin georgie Injectable 54503 Unit(s) IV Push every other day  insulin lispro (HumaLOG) corrective regimen sliding scale   SubCutaneous three times a day before meals  insulin lispro (HumaLOG) corrective regimen sliding scale   SubCutaneous at bedtime  metoprolol 25 milliGRAM(s) Oral two times a day  Nephro-sophie 1 Tablet(s) Oral daily  pantoprazole  Injectable 40 milliGRAM(s) IV Push daily  tamsulosin 0.4 milliGRAM(s) Oral at bedtime  warfarin 2 milliGRAM(s) Oral once    MEDICATIONS  (PRN):  dextrose Gel 1 Dose(s) Oral once PRN Blood Glucose LESS THAN 70 milliGRAM(s)/deciLiter  glucagon  Injectable 1 milliGRAM(s) IntraMuscular once PRN Glucose <70 milliGRAM(s)/deciLiter      T(C): 37.2 (10-09-17 @ 04:20), Max: 37.2 (10-09-17 @ 04:20)  HR: 80 (10-09-17 @ 04:20) (71 - 80)  BP: 110/73 (10-09-17 @ 04:20) (110/73 - 135/77)  RR: 17 (10-09-17 @ 04:20) (17 - 18)  SpO2: 98% (10-09-17 @ 04:20) (97% - 100%)  CAPILLARY BLOOD GLUCOSE  235 (08 Oct 2017 21:35)  116 (08 Oct 2017 17:44)  143 (08 Oct 2017 13:05)  160 (08 Oct 2017 09:19)        I&O's Summary    08 Oct 2017 07:01  -  09 Oct 2017 07:00  --------------------------------------------------------  IN: 290 mL / OUT: 0 mL / NET: 290 mL        PHYSICAL EXAM  GENERAL: NAD, well-developed  HEAD:  Atraumatic, Normocephalic  EYES: EOMI, PERRLA, conjunctiva and sclera clear  NECK: Supple, No JVD  CHEST/LUNG: Clear to auscultation bilaterally; No wheeze  HEART: Regular rate and rhythm; No murmurs, rubs, or gallops  ABDOMEN: Soft, Nontender, Nondistended; Bowel sounds present  EXTREMITIES:  2+ Peripheral Pulses, right hemiplegia  PSYCH: nonverbal, responds to commands  SKIN: right upper thorax [facility]    LABS:                        9.3    7.4   )-----------( 399      ( 09 Oct 2017 05:37 )             28.1     10-09    140  |  97  |  116<H>  ----------------------------<  125<H>  5.4<H>   |  20<L>  |  10.11<H>    Ca    9.1      09 Oct 2017 05:37  Phos  8.2     10-09  Mg     2.7     10-09      PT/INR - ( 09 Oct 2017 05:37 )   PT: 14.6 sec;   INR: 1.33 ratio           RADIOLOGY & ADDITIONAL TESTS:        Imaging Personally Reviewed:  Consultant(s) Notes Reviewed:    Care Discussed with Consultants/Other Providers:

## 2017-10-09 NOTE — PROGRESS NOTE ADULT - SUBJECTIVE AND OBJECTIVE BOX
SUBJ: Patient alert but non responsive. According to nurse, patient is usually very lethargic and does not usually respond to questions.  He responds to pain.      TELE: Overnight NSR; 70-80 bpm.           MEDICATIONS  (STANDING):  amiodarone    Tablet 200 milliGRAM(s) Oral daily  aspirin  chewable 81 milliGRAM(s) Oral daily  atorvastatin 40 milliGRAM(s) Oral at bedtime  busPIRone 5 milliGRAM(s) Oral two times a day  calcium acetate 667 milliGRAM(s) Oral three times a day with meals  clopidogrel Tablet 75 milliGRAM(s) Oral daily  dextrose 5%. 1000 milliLiter(s) (50 mL/Hr) IV Continuous <Continuous>  dextrose 50% Injectable 12.5 Gram(s) IV Push once  dextrose 50% Injectable 25 Gram(s) IV Push once  dextrose 50% Injectable 25 Gram(s) IV Push once  epoetin georgie Injectable 91753 Unit(s) IV Push every other day  insulin lispro (HumaLOG) corrective regimen sliding scale   SubCutaneous three times a day before meals  insulin lispro (HumaLOG) corrective regimen sliding scale   SubCutaneous at bedtime  metoprolol 25 milliGRAM(s) Oral two times a day  Nephro-sophie 1 Tablet(s) Oral daily  pantoprazole  Injectable 40 milliGRAM(s) IV Push daily  tamsulosin 0.4 milliGRAM(s) Oral at bedtime  warfarin 2 milliGRAM(s) Oral once    MEDICATIONS  (PRN):  dextrose Gel 1 Dose(s) Oral once PRN Blood Glucose LESS THAN 70 milliGRAM(s)/deciLiter  glucagon  Injectable 1 milliGRAM(s) IntraMuscular once PRN Glucose <70 milliGRAM(s)/deciLiter            Vital Signs Last 24 Hrs  T(C): 37.2 (09 Oct 2017 04:20), Max: 37.2 (09 Oct 2017 04:20)  T(F): 98.9 (09 Oct 2017 04:20), Max: 98.9 (09 Oct 2017 04:20)  HR: 80 (09 Oct 2017 04:20) (71 - 80)  BP: 110/73 (09 Oct 2017 04:20) (110/73 - 135/77)  BP(mean): --  RR: 17 (09 Oct 2017 04:20) (17 - 18)  SpO2: 98% (09 Oct 2017 04:20) (97% - 100%)    REVIEW OF SYSTEMS:  CONSTITUTIONAL: No fever, weight loss, or fatigue  EYES: No eye pain, visual disturbances, or discharge  ENMT:  No difficulty hearing, tinnitus, vertigo; No sinus or throat pain  NECK: No pain or stiffness  RESPIRATORY: No cough, wheezing, chills or hemoptysis; No shortness of breath  CARDIOVASCULAR: No chest pain, palpitations, dizziness, or leg swelling  GASTROINTESTINAL: No abdominal or epigastric pain. No nausea, vomiting, or hematemesis; No diarrhea or constipation. No melena or hematochezia.  GENITOURINARY: No dysuria, frequency, hematuria, or incontinence  NEUROLOGICAL: No headaches, memory loss, loss of strength, numbness, or tremors  SKIN: No itching, burning, rashes, or lesions   LYMPH NODES: No enlarged glands  ENDOCRINE: No heat or cold intolerance; No hair loss  MUSCULOSKELETAL: No joint pain or swelling; No muscle, back, or extremity pain  PSYCHIATRIC: No depression, anxiety, mood swings, or difficulty sleeping  HEME/LYMPH: No easy bruising, or bleeding gums  ALLERY AND IMMUNOLOGIC: No hives or eczema    PHYSICAL EXAM:  · CONSTITUTIONAL:	Well-developed, well nourished    BMI-  · EYES:	EOMI; PERRL; no drainage or redness  · ENMT	No oral lesions; no gross abnormalities  · NECK:	No bruits; no thyromegaly or nodules  ·BACK:	No deformity or limitation of movement  ·RESPIRATORY:   airway patent; breath sounds equal; good air movement; respirations non-labored; clear to auscultation bilaterally; no chest wall tenderness; no intercostal retractions; no rales,rhonchi or wheeze  · CARDIOVASCULAR	regular rate and rhythm  no rub  no murmur  normal PMI  . GASTROINTESTINAL:  no distention; no masses palpable; bowel sounds normal; no rebound tenderness; no guarding; no rigidity; no organomegaly  · EXTREMITIES: No cyanosis, clubbing or edema  · VASCULAR: 	Equal and normal pulses (carotid, femoral, dorsalis pedis)  ·NEUROLOGICAL:   alert and oriented x 3; sensation intact; deep reflexes intact; cranial nerves intact; no spontaneous movement; superficial reflexes intact; normal strength  · SKIN:	No lesions; no rash  . LYMPH NODES:	No lymphadedenopathy  · MUSCULOSKELETAL:   No calf tenderness  no joint swelling	  TELEMETRY:    ECG:    TTE:    LABS:                        9.3    7.4   )-----------( 399      ( 09 Oct 2017 05:37 )             28.1     10-09    140  |  97  |  116<H>  ----------------------------<  125<H>  5.4<H>   |  20<L>  |  10.11<H>    Ca    9.1      09 Oct 2017 05:37  Phos  8.2     10-09  Mg     2.7     10-09          PT/INR - ( 09 Oct 2017 05:37 )   PT: 14.6 sec;   INR: 1.33 ratio           Creatinine Trend: 10.11<--, 8.33<--, 6.01<--, 8.46<--, 6.01<--, 8.29<--  I&O's Summary    08 Oct 2017 07:01  -  09 Oct 2017 07:00  --------------------------------------------------------  IN: 290 mL / OUT: 0 mL / NET: 290 mL      BNP  RADIOLOGY & ADDITIONAL STUDIES:    IMPRESSION AND PLAN: SUBJ: Patient appears alert but is non verbal. According to nurse, patient is usually very lethargic and does not usually respond to questions.  He responds to pain.      MEDICATIONS  (STANDING):  amiodarone    Tablet 200 milliGRAM(s) Oral daily  aspirin  chewable 81 milliGRAM(s) Oral daily  atorvastatin 40 milliGRAM(s) Oral at bedtime  busPIRone 5 milliGRAM(s) Oral two times a day  calcium acetate 667 milliGRAM(s) Oral three times a day with meals  clopidogrel Tablet 75 milliGRAM(s) Oral daily  dextrose 5%. 1000 milliLiter(s) (50 mL/Hr) IV Continuous <Continuous>  dextrose 50% Injectable 12.5 Gram(s) IV Push once  dextrose 50% Injectable 25 Gram(s) IV Push once  dextrose 50% Injectable 25 Gram(s) IV Push once  epoetin georgie Injectable 60549 Unit(s) IV Push every other day  insulin lispro (HumaLOG) corrective regimen sliding scale   SubCutaneous three times a day before meals  insulin lispro (HumaLOG) corrective regimen sliding scale   SubCutaneous at bedtime  metoprolol 25 milliGRAM(s) Oral two times a day  Nephro-sophie 1 Tablet(s) Oral daily  pantoprazole  Injectable 40 milliGRAM(s) IV Push daily  tamsulosin 0.4 milliGRAM(s) Oral at bedtime  warfarin 2 milliGRAM(s) Oral once    MEDICATIONS  (PRN):  dextrose Gel 1 Dose(s) Oral once PRN Blood Glucose LESS THAN 70 milliGRAM(s)/deciLiter  glucagon  Injectable 1 milliGRAM(s) IntraMuscular once PRN Glucose <70 milliGRAM(s)/deciLiter    REVIEW OF SYSTEMS:  unable to obtain; patient non-verbal.    Vital Signs Last 24 Hrs  T(C): 37.2 (09 Oct 2017 04:20), Max: 37.2 (09 Oct 2017 04:20)  T(F): 98.9 (09 Oct 2017 04:20), Max: 98.9 (09 Oct 2017 04:20)  HR: 80 (09 Oct 2017 04:20) (71 - 80)  BP: 110/73 (09 Oct 2017 04:20) (110/73 - 135/77)  RR: 17 (09 Oct 2017 04:20) (17 - 18)  SpO2: 98% (09 Oct 2017 04:20) (97% - 100%)    PHYSICAL EXAM:  · CONSTITUTIONAL:  Well-developed, well nourished     · EYES:	EOMI; PERRL; no drainage or redness  · ENMT	No oral lesions; no gross abnormalities  · NECK:	No bruits; no thyromegaly or nodules  · BACK:	No deformity or limitation of movement  · RESPIRATORY:   airway patent; breath sounds equal; good air movement; respirations non-labored; clear to auscultation bilaterally; no chest wall tenderness; no intercostal retractions; no rales, rhonchi or wheeze  · CARDIOVASCULAR:  regular rate and rhythm  PMI not displaced.  Normal S1 and S2.  No rub  no murmur    . GASTROINTESTINAL:  no distention; no masses palpable; bowel sounds normal; no rebound tenderness; no guarding; no rigidity; no organomegaly  · EXTREMITIES: No cyanosis, clubbing or edema  · VASCULAR: 	Equal and normal pulses (carotid, femoral, dorsalis pedis)  · NEURO:  sensation intact; deep reflexes intact; cranial nerves intact; no spontaneous movement; superficial reflexes intact.  · SKIN:	No lesions; no rash  . LYMPH NODES:	No lymphadedenopathy  · MUSCULOSKELETAL:   No calf tenderness  no joint swelling	    TELE: Overnight NSR; 70-80 bpm.         LABS:                        9.3    7.4   )-----------( 399      ( 09 Oct 2017 05:37 )             28.1     10-09    140  |  97  |  116<H>  ----------------------------<  125<H>  5.4<H>   |  20<L>  |  10.11<H>    Ca    9.1      09 Oct 2017 05:37  Phos  8.2     10-09  Mg     2.7     10-09    PT/INR - ( 09 Oct 2017 05:37 )   PT: 14.6 sec;   INR: 1.33 ratio      Creatinine Trend: 10.11<--, 8.33<--, 6.01<--, 8.46<--, 6.01<--, 8.29<--    I&O's Summary    08 Oct 2017 07:01  -  09 Oct 2017 07:00  --------------------------------------------------------  IN: 290 mL / OUT: 0 mL / NET: 290 mL

## 2017-10-09 NOTE — PROGRESS NOTE ADULT - ASSESSMENT
73 yo M w/PMH of extensive CAD, T2DM, CKD, prior CVA transferred from OSH for NSTEMI s/p Impella-assisted LHC w/BABAR x 5 and obstructive nephropathy requiring HD transferred from CCU after PEA/Vtach arrest with ROSC c/b new embolic CVA. Pt remains nonverbal, with right hemiplegia, receiving HD MWF, pending permacath by IR today.

## 2017-10-09 NOTE — PROGRESS NOTE ADULT - PROBLEM SELECTOR PLAN 1
patient with new ESRD on HD, c/b hyperkalemia   - avoid nephrotoxins, renally dose meds  - c/w HD per nephrology - MWF, pending today.  - awaiting placement of permacath today by IR, advised INR  must be <1.5  - INR at target for procedure, 1.3

## 2017-10-10 LAB
ANION GAP SERPL CALC-SCNC: 19 MMOL/L — HIGH (ref 5–17)
BASOPHILS # BLD AUTO: 0 K/UL — SIGNIFICANT CHANGE UP (ref 0–0.2)
BASOPHILS NFR BLD AUTO: 0.5 % — SIGNIFICANT CHANGE UP (ref 0–2)
BUN SERPL-MCNC: 51 MG/DL — HIGH (ref 7–23)
CALCIUM SERPL-MCNC: 9.3 MG/DL — SIGNIFICANT CHANGE UP (ref 8.4–10.5)
CHLORIDE SERPL-SCNC: 96 MMOL/L — SIGNIFICANT CHANGE UP (ref 96–108)
CO2 SERPL-SCNC: 25 MMOL/L — SIGNIFICANT CHANGE UP (ref 22–31)
CREAT SERPL-MCNC: 5.24 MG/DL — HIGH (ref 0.5–1.3)
EOSINOPHIL # BLD AUTO: 0.1 K/UL — SIGNIFICANT CHANGE UP (ref 0–0.5)
EOSINOPHIL NFR BLD AUTO: 0.9 % — SIGNIFICANT CHANGE UP (ref 0–6)
GLUCOSE BLDC GLUCOMTR-MCNC: 161 MG/DL — HIGH (ref 70–99)
GLUCOSE BLDC GLUCOMTR-MCNC: 171 MG/DL — HIGH (ref 70–99)
GLUCOSE BLDC GLUCOMTR-MCNC: 210 MG/DL — HIGH (ref 70–99)
GLUCOSE SERPL-MCNC: 144 MG/DL — HIGH (ref 70–99)
HCT VFR BLD CALC: 30.8 % — LOW (ref 39–50)
HGB BLD-MCNC: 10.2 G/DL — LOW (ref 13–17)
INR BLD: 1.41 RATIO — HIGH (ref 0.88–1.16)
LYMPHOCYTES # BLD AUTO: 1.3 K/UL — SIGNIFICANT CHANGE UP (ref 1–3.3)
LYMPHOCYTES # BLD AUTO: 19.4 % — SIGNIFICANT CHANGE UP (ref 13–44)
MAGNESIUM SERPL-MCNC: 2.2 MG/DL — SIGNIFICANT CHANGE UP (ref 1.6–2.6)
MCHC RBC-ENTMCNC: 29.9 PG — SIGNIFICANT CHANGE UP (ref 27–34)
MCHC RBC-ENTMCNC: 33 GM/DL — SIGNIFICANT CHANGE UP (ref 32–36)
MCV RBC AUTO: 90.7 FL — SIGNIFICANT CHANGE UP (ref 80–100)
MONOCYTES # BLD AUTO: 0.6 K/UL — SIGNIFICANT CHANGE UP (ref 0–0.9)
MONOCYTES NFR BLD AUTO: 8 % — SIGNIFICANT CHANGE UP (ref 2–14)
NEUTROPHILS # BLD AUTO: 4.9 K/UL — SIGNIFICANT CHANGE UP (ref 1.8–7.4)
NEUTROPHILS NFR BLD AUTO: 71.2 % — SIGNIFICANT CHANGE UP (ref 43–77)
PHOSPHATE SERPL-MCNC: 5.4 MG/DL — HIGH (ref 2.5–4.5)
PLATELET # BLD AUTO: 370 K/UL — SIGNIFICANT CHANGE UP (ref 150–400)
POTASSIUM SERPL-MCNC: 4.7 MMOL/L — SIGNIFICANT CHANGE UP (ref 3.5–5.3)
POTASSIUM SERPL-SCNC: 4.7 MMOL/L — SIGNIFICANT CHANGE UP (ref 3.5–5.3)
PROTHROM AB SERPL-ACNC: 15.3 SEC — HIGH (ref 9.8–12.7)
RBC # BLD: 3.4 M/UL — LOW (ref 4.2–5.8)
RBC # FLD: 14.6 % — HIGH (ref 10.3–14.5)
SODIUM SERPL-SCNC: 140 MMOL/L — SIGNIFICANT CHANGE UP (ref 135–145)
WBC # BLD: 6.9 K/UL — SIGNIFICANT CHANGE UP (ref 3.8–10.5)
WBC # FLD AUTO: 6.9 K/UL — SIGNIFICANT CHANGE UP (ref 3.8–10.5)

## 2017-10-10 PROCEDURE — 99232 SBSQ HOSP IP/OBS MODERATE 35: CPT

## 2017-10-10 RX ORDER — WARFARIN SODIUM 2.5 MG/1
2 TABLET ORAL ONCE
Qty: 0 | Refills: 0 | Status: COMPLETED | OUTPATIENT
Start: 2017-10-10 | End: 2017-10-10

## 2017-10-10 RX ORDER — ALBUTEROL 90 UG/1
2 AEROSOL, METERED ORAL
Qty: 0 | Refills: 0 | COMMUNITY

## 2017-10-10 RX ADMIN — PANTOPRAZOLE SODIUM 40 MILLIGRAM(S): 20 TABLET, DELAYED RELEASE ORAL at 11:41

## 2017-10-10 RX ADMIN — CLOPIDOGREL BISULFATE 75 MILLIGRAM(S): 75 TABLET, FILM COATED ORAL at 11:41

## 2017-10-10 RX ADMIN — Medication 667 MILLIGRAM(S): at 11:41

## 2017-10-10 RX ADMIN — Medication 81 MILLIGRAM(S): at 11:41

## 2017-10-10 RX ADMIN — Medication 1 TABLET(S): at 11:41

## 2017-10-10 RX ADMIN — WARFARIN SODIUM 2 MILLIGRAM(S): 2.5 TABLET ORAL at 22:50

## 2017-10-10 RX ADMIN — Medication 25 MILLIGRAM(S): at 17:01

## 2017-10-10 RX ADMIN — Medication 25 MILLIGRAM(S): at 05:33

## 2017-10-10 RX ADMIN — Medication 1: at 17:58

## 2017-10-10 RX ADMIN — Medication 2: at 13:08

## 2017-10-10 RX ADMIN — Medication 667 MILLIGRAM(S): at 17:01

## 2017-10-10 RX ADMIN — ATORVASTATIN CALCIUM 40 MILLIGRAM(S): 80 TABLET, FILM COATED ORAL at 22:50

## 2017-10-10 RX ADMIN — AMIODARONE HYDROCHLORIDE 200 MILLIGRAM(S): 400 TABLET ORAL at 05:33

## 2017-10-10 RX ADMIN — Medication: at 10:24

## 2017-10-10 RX ADMIN — TAMSULOSIN HYDROCHLORIDE 0.4 MILLIGRAM(S): 0.4 CAPSULE ORAL at 22:50

## 2017-10-10 RX ADMIN — Medication 5 MILLIGRAM(S): at 05:33

## 2017-10-10 RX ADMIN — Medication 5 MILLIGRAM(S): at 17:01

## 2017-10-10 NOTE — PROGRESS NOTE ADULT - SUBJECTIVE AND OBJECTIVE BOX
SUBJ: Mr. Lara was seen and examined today at bedside.  Mr. Lara is nonverbal. [employer]  ([employer]#[id number]) services used. Patient did not move left hand upon 's command.  Patient raised left hand during the interview.      MEDICATIONS  (STANDING):  amiodarone    Tablet 200 milliGRAM(s) Oral daily  aspirin  chewable 81 milliGRAM(s) Oral daily  atorvastatin 40 milliGRAM(s) Oral at bedtime  busPIRone 5 milliGRAM(s) Oral two times a day  calcium acetate 667 milliGRAM(s) Oral three times a day with meals  clopidogrel Tablet 75 milliGRAM(s) Oral daily  dextrose 5%. 1000 milliLiter(s) (50 mL/Hr) IV Continuous <Continuous>  dextrose 50% Injectable 12.5 Gram(s) IV Push once  dextrose 50% Injectable 25 Gram(s) IV Push once  dextrose 50% Injectable 25 Gram(s) IV Push once  epoetin georgie Injectable 28150 Unit(s) IV Push every other day  insulin lispro (HumaLOG) corrective regimen sliding scale   SubCutaneous three times a day before meals  insulin lispro (HumaLOG) corrective regimen sliding scale   SubCutaneous at bedtime  metoprolol 25 milliGRAM(s) Oral two times a day  Nephro-sophie 1 Tablet(s) Oral daily  pantoprazole  Injectable 40 milliGRAM(s) IV Push daily  tamsulosin 0.4 milliGRAM(s) Oral at bedtime  warfarin 2 milliGRAM(s) Oral once    MEDICATIONS  (PRN):  dextrose Gel 1 Dose(s) Oral once PRN Blood Glucose LESS THAN 70 milliGRAM(s)/deciLiter  glucagon  Injectable 1 milliGRAM(s) IntraMuscular once PRN Glucose <70 milliGRAM(s)/deciLiter            Vital Signs Last 24 Hrs  T(C): 36.4 (10 Oct 2017 05:15), Max: 37.2 (09 Oct 2017 12:20)  T(F): 97.6 (10 Oct 2017 05:15), Max: 98.9 (09 Oct 2017 12:20)  HR: 82 (10 Oct 2017 10:03) (76 - 84)  BP: 130/77 (10 Oct 2017 05:15) (120/68 - 137/67)  BP(mean): --  RR: 18 (10 Oct 2017 05:15) (17 - 18)  SpO2: 96% (10 Oct 2017 10:03) (96% - 100%)    REVIEW OF SYSTEMS:  Not done because patient is nonverbal    PHYSICAL EXAM:  · CONSTITUTIONAL:  Nonverbal.  Lies in bed.  Limited response to commands.  Lethargic. Restricted facial expression  · NECK:	No bruits;  ·RESPIRATORY:  Minimal breath sounds.  CTA bilaterally.  No rhonchi, wheezes, rales appreciated   · CARDIOVASCULAr: regular rate and rhythm.  S1/S2 present  no rub  no murmur  no gallops  normal PMI  · EXTREMITIES: No cyanosis, clubbing or edema  · VASCULAR: 	Equal and normal pulses (carotid, femoral, dorsalis pedis)  ·NEUROLOGICAL:  Patient nonverbal. Opens eyes after shaking his hand and repeatedly saying his name.  Does not respond to command with .     	  TELEMETRY: NSR.  HR 80-90s    ECG:    TTE:    LABS:                        10.2   6.9   )-----------( 370      ( 10 Oct 2017 05:52 )             30.8     10-10    140  |  96  |  51<H>  ----------------------------<  144<H>  4.7   |  25  |  5.24<H>    Ca    9.3      10 Oct 2017 05:52  Phos  5.4     10-10  Mg     2.2     10-10          PT/INR - ( 10 Oct 2017 05:52 )   PT: 15.3 sec;   INR: 1.41 ratio           Creatinine Trend: 5.24<--, 10.11<--, 8.33<--, 6.01<--, 8.46<--, 6.01<--  I&O's Summary    09 Oct 2017 07:01  -  10 Oct 2017 07:00  --------------------------------------------------------  IN: 250 mL / OUT: 500 mL / NET: -250 mL      BNP  RADIOLOGY & ADDITIONAL STUDIES:    IMPRESSION AND PLAN: SUBJ: Mr. Lara was seen and examined today at bedside.  Mr. Lara is non-verbal. [employer]  ([facility]#[id number]) services used. Patient did not move left hand upon 's command.  Patient raised left hand during the interview.    MEDICATIONS  (STANDING):  amiodarone    Tablet 200 milliGRAM(s) Oral daily  aspirin  chewable 81 milliGRAM(s) Oral daily  atorvastatin 40 milliGRAM(s) Oral at bedtime  busPIRone 5 milliGRAM(s) Oral two times a day  calcium acetate 667 milliGRAM(s) Oral three times a day with meals  clopidogrel Tablet 75 milliGRAM(s) Oral daily  dextrose 5%. 1000 milliLiter(s) (50 mL/Hr) IV Continuous <Continuous>  dextrose 50% Injectable 12.5 Gram(s) IV Push once  dextrose 50% Injectable 25 Gram(s) IV Push once  dextrose 50% Injectable 25 Gram(s) IV Push once  epoetin georgie Injectable 37990 Unit(s) IV Push every other day  insulin lispro (HumaLOG) corrective regimen sliding scale   SubCutaneous three times a day before meals  insulin lispro (HumaLOG) corrective regimen sliding scale   SubCutaneous at bedtime  metoprolol 25 milliGRAM(s) Oral two times a day  Nephro-sophie 1 Tablet(s) Oral daily  pantoprazole  Injectable 40 milliGRAM(s) IV Push daily  tamsulosin 0.4 milliGRAM(s) Oral at bedtime  warfarin 2 milliGRAM(s) Oral once    MEDICATIONS  (PRN):  dextrose Gel 1 Dose(s) Oral once PRN Blood Glucose LESS THAN 70 milliGRAM(s)/deciLiter  glucagon  Injectable 1 milliGRAM(s) IntraMuscular once PRN Glucose <70 milliGRAM(s)/deciLiter    REVIEW OF SYSTEMS:  Not done because patient is nonverbal    Vital Signs Last 24 Hrs  T(C): 36.4 (10 Oct 2017 05:15), Max: 37.2 (09 Oct 2017 12:20)  T(F): 97.6 (10 Oct 2017 05:15), Max: 98.9 (09 Oct 2017 12:20)  HR: 82 (10 Oct 2017 10:03) (76 - 84)  BP: 130/77 (10 Oct 2017 05:15) (120/68 - 137/67)  RR: 18 (10 Oct 2017 05:15) (17 - 18)  SpO2: 96% (10 Oct 2017 10:03) (96% - 100%)    PHYSICAL EXAM:  · CONSTITUTIONAL:  Nonverbal.  Lies in bed.  Limited response to commands.  Lethargic. Restricted facial expression  · NECK:	No bruits;  · RESPIRATORY:  Minimal breath sounds.  CTA bilaterally.  No rhonchi, wheezes, rales appreciated   · CARDIOVASCULAR:   PMI not displaced.  Regular rate and rhythm.  S1/S2 normal.  No rub, murmur or gallop.  · EXTREMITIES: No cyanosis, clubbing or edema  · VASCULAR: 	Equal and normal pulses (carotid, femoral, dorsalis pedis)  · NEUROLOGICAL:  Patient non-verbal. Opens eyes after shaking his hand and repeatedly saying his name.  Does not respond to command with .     	  TELEMETRY: NSR,  HR 80-90s    LABS:                        10.2   6.9   )-----------( 370      ( 10 Oct 2017 05:52 )             30.8     10-10    140  |  96  |  51<H>  ----------------------------<  144<H>  4.7   |  25  |  5.24<H>    Ca    9.3      10 Oct 2017 05:52  Phos  5.4     10-10  Mg     2.2     10-10    PT/INR - ( 10 Oct 2017 05:52 )   PT: 15.3 sec;   INR: 1.41 ratio      Creatinine Trend: 5.24<--, 10.11<--, 8.33<--, 6.01<--, 8.46<--, 6.01<--    I&O's Summary    09 Oct 2017 07:01  -  10 Oct 2017 07:00  --------------------------------------------------------  IN: 250 mL / OUT: 500 mL / NET: -250 mL

## 2017-10-10 NOTE — PROGRESS NOTE ADULT - ASSESSMENT
72yoM, hx of CAD (DESx18), CKDm DM2, COPD, CVA (remote, no deficits), Bipolar (not on meds). Presented with flank pain, developed NSTEMI, hypertensive emergency,  TWI. [facility] on 9/14 with BABAR to pRCA.  9/21 had cardiac arrest, tele shows bradycardia, evolving intop VF.  18 minutes CPR, intubated.   9/23 with new CVA dx, new aphasia, weakness. 9/27 EF 20%      VF arrest  - likely for PPM tomorrow  - NPO at midnight.  - EP to continue to follow  - please maintain K>4 , Mg>2  - For any questions or If there are any concerns, please call [phone] during daytime, covered by [phone] after-hours

## 2017-10-10 NOTE — PROGRESS NOTE ADULT - PROBLEM SELECTOR PLAN 1
patient with new ESRD on HD, c/b hyperkalemia   - avoid nephrotoxins, renally dose meds  - c/w HD per nephrology; HD M/W/F  - s/p permacath 10/9 by IR

## 2017-10-10 NOTE — PROGRESS NOTE ADULT - PROBLEM SELECTOR PLAN 2
Patient with worsening systolic dysfunction s/p NSTEMI/arrest  - will d/w family for possible PPM placement  - c/w fluid removal by intermittent HD  - c/w metoprolol 25mg BID  - cardio following

## 2017-10-10 NOTE — PROGRESS NOTE ADULT - ATTENDING COMMENTS
Agree w R1/R2 resident plan of care  72M with h/o CAD (DESx18), CKDm DM2, COPD, CVA (remote, no deficits), Bipolar (not on meds). Presented with flank pain, developed NSTEMI, hypertensive emergency,  TWI. [facility] on 9/14 with BABAR to pRCA.  9/21 had cardiac arrest, tele shows bradycardia, evolving intop VF.  18 minutes CPR, intubated.   9/23 with new CVA dx, new aphasia, weakness. 9/27 EF 20%  - Afebrile, unchanged status, stable vitals, no acute distress  Resumed coumadin yesterday after perm cath done by IR( family wants HD). HD per Renal  - Cardiology plan noted- PPM tomorrow.  - c/w ASA, plavix, BB, statin, amiodarone and AC (INR 2-3)  - DNR Agree w R1/R2 resident plan of care  72M with h/o CAD (DESx18), CKD, DM2, COPD, CVA (remote, no deficits), Bipolar (not on meds). Presented with flank pain, developed NSTEMI, hypertensive emergency,  TWI. [facility] on 9/14 with BABAR to pRCA.  9/21 had cardiac arrest, tele shows bradycardia, evolving intop VF.  18 minutes CPR, intubated.   9/23 with new CVA dx, new aphasia, weakness. 9/27 EF 20%  - Afebrile, unchanged status, stable vitals, no acute distress  Resumed coumadin yesterday after perm cath done by IR( family wants HD). HD per Renal  - Cardiology plan noted- PPM recommended but Family refused PPM  - c/w ASA, plavix, BB, statin, amiodarone and AC (INR 2-3)  - DNR

## 2017-10-10 NOTE — PROGRESS NOTE ADULT - ASSESSMENT
71 yo M w/PMH of extensive CAD, T2DM, CKD, prior CVA transferred from OSH for NSTEMI s/p Impella-assisted LHC w/BABAR x 5 and obstructive nephropathy requiring HD transferred from CCU after PEA/Vtach arrest with ROSC c/b new embolic CVA. S/p permacath placement by IR on 10/9

## 2017-10-10 NOTE — PROGRESS NOTE ADULT - ASSESSMENT
Kelsy Lara is a 71 yo M w/PMH of extensive CAD, prior CVA, T2DM, ESRD HD via permacath, transferred from OSH for NSTEMI, and pyelonephritis with course c/b obstructive nephropathy, s/p impella assisted LHC with DESx5 who suffered cardiac arrest, vtach, and subsequent ROSC w/ hospital course further c/b embolic CVA has now right hemiplegia, and nonverbal.         Neurology-  1.  CVA (cerebral vascular accident).  Plan: patient with CTH (9/23) demonstrating multifocal infarcts concerning for embolic strokes  	- c/w DAPT  	 -c/w atorvastatin  	- PT/OT following  	- non verbal   	-right hemiplegia, can follow commands with left extremities.      Cardiovascular-  1.  NSTEMI (non-ST elevated MI) Plan: Patient presented with NSTEMI, s/p Impella-assisted Cath with BABAR x5 to LAD/RCA; complicated by subsequent PEA arrest  	- c/w DAPT, statin, and beta-blocker  	- per renal, no plan to start ACE/ARB given CKD    2. Acute systolic congestive heart failure (EF-20%, NYHA 2-3, ACC/AHA C-euvolemic).  Plan: Patient with worsening systolic dysfunction s/p NSTEMI/arrest  	- EP will place single lead PPM today or tomorrow as of 10/10 10am.    	- c/w fluid removal by intermittent HD;   	- c/w metoprolol 25mg BID    3. Atrial fibrillation and flutter.  Plan: -atrial fibrillation stable; INR today 1.33  	- c/w amiodarone 200 QD per EP;   	- c/w continue metoprolol  	- warfarin held for goal INR < 1.5 (1.3 ideal) for catheter placement and pacemaker    Pulmonology  1. COPD (chronic obstructive pulmonary disease).  Plan: - stable on room air  - c/w DuoNebs PRN, incentive spirometer.     Nephrology   1. ESRD (end stage renal disease).  Plan: patient with new ESRD on HD, c/b hyperkalemia   	-s/p permacath day 1   	- avoid nephrotoxins, renally dose meds  	- c/w HD per nephrology - MWF.    Gastrointestinal   1. Dysphagia. Plan: Patient with new dysphagia in the setting of CVA  	- honey nectar consistency diet  	  Endocrinology  1. Type 2 diabetes mellitus.  Plan: Patient with HbA1c of 6.5%  	- serum glucose 125, given significant debility would not favor tighter control  	- c/w Lispro SS.     Code: DNR/DNI  DVT PPx: Coumadin held for pacemaker placement.   Diet: Honey nectar consistency diet Mr. Lara is a 71 yo M w/PMH of extensive CAD, prior CVA, T2DM, ESRD HD via permacath.  Transferred from OSH for N-STEMI and pyelonephritis, with course c/b obstructive nephropathy, s/p impella assisted LHC with DESx5 who suffered cardiac arrest, V. Tach, and subsequent ROSC w/ hospital course further c/b embolic CVA has now right hemiplegia, and nonverbal.         Neurology-  1.  CVA (cerebral vascular accident).  Plan: patient with CTH (9/23) demonstrating multifocal infarcts concerning for embolic strokes  	- c/w DAPT  	- c/w atorvastatin  	- PT/OT following  	- non verbal   	- right hemiplegia, can follow commands with left extremities.      Cardiovascular-  1.  NSTEMI (non-ST elevated MI) Plan: Patient presented with NSTEMI, s/p Impella-assisted Cath with BABAR x5 to LAD/RCA; complicated by subsequent PEA arrest  	- c/w DAPT, statin, and beta-blocker  	- per renal, no plan to start ACE/ARB given CKD    2. Acute systolic congestive heart failure (EF-20%, NYHA 2-3, ACC/AHA C-euvolemic).  Plan: Patient with worsening systolic dysfunction s/p NSTEMI/arrest  	- EP will place single lead PPM today or tomorrow as of 10/10 10am.    	- c/w fluid removal by intermittent HD;   	- c/w metoprolol 25mg BID    3. Atrial fibrillation and flutter.  Plan: -atrial fibrillation stable; INR today 1.33  	- c/w amiodarone 200 QD per EP;   	- c/w continue metoprolol  	- warfarin held for goal INR < 1.5 (1.3 ideal) for catheter placement and pacemaker    Pulmonology  1. COPD (chronic obstructive pulmonary disease).  Plan: - stable on room air  - c/w DuoNebs PRN, incentive spirometer.     Nephrology   1. ESRD (end stage renal disease).  Plan: patient with new ESRD on HD, c/b hyperkalemia   	-s/p permacath day 1   	- avoid nephrotoxins, renally dose meds  	- c/w HD per nephrology - MWF.    Gastrointestinal   1. Dysphagia. Plan: Patient with new dysphagia in the setting of CVA  	- honey nectar consistency diet  	  Endocrinology  1. Type 2 diabetes mellitus.  Plan: Patient with HbA1c of 6.5%  	- serum glucose 125, given significant debility would not favor tighter control  	- c/w Lispro SS.     Code: DNR/DNI  DVT PPx: Coumadin held for pacemaker placement.   Diet: Honey nectar consistency diet    MONSERRAT Montano, MS4  [id number]    Patient seen and examined; discussed with student, MONSERRAT Montano and cardiology fellow, RENATE Villalobos. Hx., exam and labs as above.  I agree with the assessment and recommendations.   Erick Roy M.D.  Cardiology Consult Service  [phone] or [phone]

## 2017-10-10 NOTE — PROGRESS NOTE ADULT - SUBJECTIVE AND OBJECTIVE BOX
24H hour events:  aphasic  MEDICATIONS:  amiodarone    Tablet 200 milliGRAM(s) Oral daily  aspirin  chewable 81 milliGRAM(s) Oral daily  clopidogrel Tablet 75 milliGRAM(s) Oral daily  metoprolol 25 milliGRAM(s) Oral two times a day  tamsulosin 0.4 milliGRAM(s) Oral at bedtime  warfarin 2 milliGRAM(s) Oral once        busPIRone 5 milliGRAM(s) Oral two times a day    pantoprazole  Injectable 40 milliGRAM(s) IV Push daily    atorvastatin 40 milliGRAM(s) Oral at bedtime  dextrose 50% Injectable 12.5 Gram(s) IV Push once  dextrose 50% Injectable 25 Gram(s) IV Push once  dextrose 50% Injectable 25 Gram(s) IV Push once  dextrose Gel 1 Dose(s) Oral once PRN  glucagon  Injectable 1 milliGRAM(s) IntraMuscular once PRN  insulin lispro (HumaLOG) corrective regimen sliding scale   SubCutaneous three times a day before meals  insulin lispro (HumaLOG) corrective regimen sliding scale   SubCutaneous at bedtime    calcium acetate 667 milliGRAM(s) Oral three times a day with meals  dextrose 5%. 1000 milliLiter(s) IV Continuous <Continuous>  epoetin georgie Injectable 17664 Unit(s) IV Push every other day  Nephro-sophie 1 Tablet(s) Oral daily        PHYSICAL EXAM:  T(C): 36.4 (10-10-17 @ 05:15), Max: 36.9 (10-09-17 @ 22:00)  HR: 82 (10-10-17 @ 10:03) (76 - 84)  BP: 130/77 (10-10-17 @ 05:15) (120/68 - 137/67)  RR: 18 (10-10-17 @ 05:15) (18 - 18)  SpO2: 96% (10-10-17 @ 10:03) (96% - 100%)  Wt(kg): --  I&O's Summary    09 Oct 2017 07:01  -  10 Oct 2017 07:00  --------------------------------------------------------  IN: 250 mL / OUT: 500 mL / NET: -250 mL    10 Oct 2017 07:01  -  10 Oct 2017 12:43  --------------------------------------------------------  IN: 360 mL / OUT: 0 mL / NET: 360 mL        Appearance: Normal	  HEENT:   Normal oral mucosa  Lymphatic: No lymphadenopathy  Cardiovascular: Normal S1 S2,         No JVD,      No murmurs,      No edema  Respiratory: Lungs clear to auscultation	  Psychiatry: Mood & affect appropriate  Gastrointestinal:  Soft, Non-tender	  Skin: No rashes, No ecchymoses, No cyanosis	  Neurologic: Non-focal  Extremities: Normal range of motion, No clubbing, cyanosis   Vascular: warm extremities        LABS:	 	    CBC Full  -  ( 10 Oct 2017 05:52 )  WBC Count : 6.9 K/uL  Hemoglobin : 10.2 g/dL  Hematocrit : 30.8 %  Platelet Count - Automated : 370 K/uL  Mean Cell Volume : 90.7 fl  Mean Cell Hemoglobin : 29.9 pg  Mean Cell Hemoglobin Concentration : 33.0 gm/dL  Auto Neutrophil # : 4.9 K/uL  Auto Lymphocyte # : 1.3 K/uL  Auto Monocyte # : 0.6 K/uL  Auto Eosinophil # : 0.1 K/uL  Auto Basophil # : 0.0 K/uL  Auto Neutrophil % : 71.2 %  Auto Lymphocyte % : 19.4 %  Auto Monocyte % : 8.0 %  Auto Eosinophil % : 0.9 %  Auto Basophil % : 0.5 %    10-10    140  |  96  |  51<H>  ----------------------------<  144<H>  4.7   |  25  |  5.24<H>  10-09    140  |  97  |  116<H>  ----------------------------<  125<H>  5.4<H>   |  20<L>  |  10.11<H>    Ca    9.3      10 Oct 2017 05:52  Ca    9.1      09 Oct 2017 05:37  Phos  5.4     10-10  Phos  8.2     10-09  Mg     2.2     10-10  Mg     2.7     10-09        proBNP:     TELEMETRY: 	  S 72

## 2017-10-10 NOTE — PROGRESS NOTE ADULT - SUBJECTIVE AND OBJECTIVE BOX
No distress, non-verbal, s/p perm cath for HD 10/9    Vital Signs Last 24 Hrs  T(C): 36.4 (10-10-17 @ 05:15), Max: 36.9 (10-09-17 @ 22:00)  T(F): 97.6 (10-10-17 @ 05:15), Max: 98.5 (10-09-17 @ 22:34)  HR: 82 (10-10-17 @ 10:03) (76 - 84)  BP: 130/77 (10-10-17 @ 05:15) (120/68 - 137/67)  BP(mean): --  RR: 18 (10-10-17 @ 05:15) (18 - 18)  SpO2: 96% (10-10-17 @ 10:03) (96% - 100%)             amiodarone    Tablet 200 milliGRAM(s) Oral daily  aspirin  chewable 81 milliGRAM(s) Oral daily  atorvastatin 40 milliGRAM(s) Oral at bedtime  busPIRone 5 milliGRAM(s) Oral two times a day  calcium acetate 667 milliGRAM(s) Oral three times a day with meals  clopidogrel Tablet 75 milliGRAM(s) Oral daily  dextrose 5%. 1000 milliLiter(s) IV Continuous <Continuous>  dextrose 50% Injectable 12.5 Gram(s) IV Push once  dextrose 50% Injectable 25 Gram(s) IV Push once  dextrose 50% Injectable 25 Gram(s) IV Push once  dextrose Gel 1 Dose(s) Oral once PRN  epoetin georgie Injectable 55493 Unit(s) IV Push every other day  glucagon  Injectable 1 milliGRAM(s) IntraMuscular once PRN  insulin lispro (HumaLOG) corrective regimen sliding scale   SubCutaneous three times a day before meals  insulin lispro (HumaLOG) corrective regimen sliding scale   SubCutaneous at bedtime  metoprolol 25 milliGRAM(s) Oral two times a day  Nephro-sophie 1 Tablet(s) Oral daily  pantoprazole  Injectable 40 milliGRAM(s) IV Push daily  tamsulosin 0.4 milliGRAM(s) Oral at bedtime  warfarin 2 milliGRAM(s) Oral once    Respiratory: good air entry  Cardiovascular: S1 S2   Gastrointestinal: soft, ND, BS present  Extremities:  no edema                           10.2   6.9   )-----------( 370      ( 10 Oct 2017 05:52 )             30.8     10 Oct 2017 05:52    140    |  96     |  51     ----------------------------<  144    4.7     |  25     |  5.24     Ca    9.3        10 Oct 2017 05:52  Phos  5.4       10 Oct 2017 05:52  Mg     2.2       10 Oct 2017 05:52      Assessment and Plan:   		  CM, NSTEMI, s/p cath, PCI  S/p arrest, intubation  PAfib, s/p CVA  For PPM 10/11  S/p LIN on CKD in setting of non-fx L kidney and R hydro due to stone plus hemodynamic/dye injury  Started on HD in [facility]  Subsequently passed stone and hydro resolved, but no renal fx recovery so far  On HD MWF via perm cath  Procrit w/HD  Renal diet  While most likely pt will require long term dialysis, would avoid ACE/ARB or other nephrotoxins for now  D/c planning to rehab when ready

## 2017-10-10 NOTE — PROGRESS NOTE ADULT - SUBJECTIVE AND OBJECTIVE BOX
Patient is a 72y old  Male who presents with a chief complaint of s/p cardiac cath (22 Sep 2017 12:34)        SUBJECTIVE / OVERNIGHT EVENTS: S/p permacath and HD yesterday. Pt remains nonverbal. [facility]  ([facility]#[id number]) services used. Pt move left hand to command with .       MEDICATIONS  (STANDING):  amiodarone    Tablet 200 milliGRAM(s) Oral daily  aspirin  chewable 81 milliGRAM(s) Oral daily  atorvastatin 40 milliGRAM(s) Oral at bedtime  busPIRone 5 milliGRAM(s) Oral two times a day  calcium acetate 667 milliGRAM(s) Oral three times a day with meals  clopidogrel Tablet 75 milliGRAM(s) Oral daily  dextrose 5%. 1000 milliLiter(s) (50 mL/Hr) IV Continuous <Continuous>  dextrose 50% Injectable 12.5 Gram(s) IV Push once  dextrose 50% Injectable 25 Gram(s) IV Push once  dextrose 50% Injectable 25 Gram(s) IV Push once  epoetin georgie Injectable 29042 Unit(s) IV Push every other day  insulin lispro (HumaLOG) corrective regimen sliding scale   SubCutaneous three times a day before meals  insulin lispro (HumaLOG) corrective regimen sliding scale   SubCutaneous at bedtime  metoprolol 25 milliGRAM(s) Oral two times a day  Nephro-sophie 1 Tablet(s) Oral daily  pantoprazole  Injectable 40 milliGRAM(s) IV Push daily  tamsulosin 0.4 milliGRAM(s) Oral at bedtime  warfarin 2 milliGRAM(s) Oral once    MEDICATIONS  (PRN):  dextrose Gel 1 Dose(s) Oral once PRN Blood Glucose LESS THAN 70 milliGRAM(s)/deciLiter  glucagon  Injectable 1 milliGRAM(s) IntraMuscular once PRN Glucose <70 milliGRAM(s)/deciLiter      T(C): 36.4 (10-10-17 @ 05:15), Max: 37.2 (10-09-17 @ 12:20)  HR: 84 (10-10-17 @ 05:15) (76 - 84)  BP: 130/77 (10-10-17 @ 05:15) (120/68 - 137/67)  RR: 18 (10-10-17 @ 05:15) (17 - 18)  SpO2: 97% (10-10-17 @ 05:15) (97% - 100%)  CAPILLARY BLOOD GLUCOSE  126 (09 Oct 2017 22:34)  120 (09 Oct 2017 17:34)        I&O's Summary    09 Oct 2017 07:01  -  10 Oct 2017 07:00  --------------------------------------------------------  IN: 250 mL / OUT: 500 mL / NET: -250 mL        PHYSICAL EXAM  GENERAL: NAD, well-developed  HEAD:  Atraumatic, Normocephalic  EYES: EOMI, PERRLA, conjunctiva and sclera clear  NECK: Supple, No JVD  CHEST/LUNG: Clear to auscultation bilaterally; No wheeze  HEART: Regular rate and rhythm; No murmurs, rubs, or gallops  ABDOMEN: Soft, Nontender, Nondistended; Bowel sounds present  EXTREMITIES:  2+ Peripheral Pulses, No clubbing, cyanosis, or edema  PSYCH: AAOx3  SKIN: R upper thorax permacath    LABS:                        10.2   6.9   )-----------( 370      ( 10 Oct 2017 05:52 )             30.8     10-10    140  |  96  |  51<H>  ----------------------------<  144<H>  4.7   |  25  |  5.24<H>    Ca    9.3      10 Oct 2017 05:52  Phos  5.4     10-10  Mg     2.2     10-10      PT/INR - ( 10 Oct 2017 05:52 )   PT: 15.3 sec;   INR: 1.41 ratio                   RADIOLOGY & ADDITIONAL TESTS:   IR Procedure (10.09.17 @ 14:22)   Impression: Successful placement of the right internal jugular vein   tunneled 19 cm tip to cuff hemodialysis catheter.      Imaging Personally Reviewed:  Consultant(s) Notes Reviewed:  Nephro, Cardio  Care Discussed with Consultants/Other Providers:

## 2017-10-11 LAB
ANION GAP SERPL CALC-SCNC: 21 MMOL/L — HIGH (ref 5–17)
BASOPHILS # BLD AUTO: 0.1 K/UL — SIGNIFICANT CHANGE UP (ref 0–0.2)
BASOPHILS NFR BLD AUTO: 1 % — SIGNIFICANT CHANGE UP (ref 0–2)
BUN SERPL-MCNC: 92 MG/DL — HIGH (ref 7–23)
CALCIUM SERPL-MCNC: 9.1 MG/DL — SIGNIFICANT CHANGE UP (ref 8.4–10.5)
CHLORIDE SERPL-SCNC: 95 MMOL/L — LOW (ref 96–108)
CO2 SERPL-SCNC: 23 MMOL/L — SIGNIFICANT CHANGE UP (ref 22–31)
CREAT SERPL-MCNC: 7.46 MG/DL — HIGH (ref 0.5–1.3)
EOSINOPHIL # BLD AUTO: 0.2 K/UL — SIGNIFICANT CHANGE UP (ref 0–0.5)
EOSINOPHIL NFR BLD AUTO: 2.6 % — SIGNIFICANT CHANGE UP (ref 0–6)
GLUCOSE BLDC GLUCOMTR-MCNC: 112 MG/DL — HIGH (ref 70–99)
GLUCOSE BLDC GLUCOMTR-MCNC: 123 MG/DL — HIGH (ref 70–99)
GLUCOSE BLDC GLUCOMTR-MCNC: 169 MG/DL — HIGH (ref 70–99)
GLUCOSE BLDC GLUCOMTR-MCNC: 200 MG/DL — HIGH (ref 70–99)
GLUCOSE SERPL-MCNC: 131 MG/DL — HIGH (ref 70–99)
HBV SURFACE AB SER-ACNC: <3 MIU/ML — LOW
HBV SURFACE AG SER-ACNC: SIGNIFICANT CHANGE UP
HCT VFR BLD CALC: 31.8 % — LOW (ref 39–50)
HCV AB S/CO SERPL IA: 0.33 S/CO — SIGNIFICANT CHANGE UP
HCV AB SERPL-IMP: SIGNIFICANT CHANGE UP
HGB BLD-MCNC: 10.2 G/DL — LOW (ref 13–17)
INR BLD: 1.5 RATIO — HIGH (ref 0.88–1.16)
LYMPHOCYTES # BLD AUTO: 1.5 K/UL — SIGNIFICANT CHANGE UP (ref 1–3.3)
LYMPHOCYTES # BLD AUTO: 23 % — SIGNIFICANT CHANGE UP (ref 13–44)
MAGNESIUM SERPL-MCNC: 2.4 MG/DL — SIGNIFICANT CHANGE UP (ref 1.6–2.6)
MCHC RBC-ENTMCNC: 29.6 PG — SIGNIFICANT CHANGE UP (ref 27–34)
MCHC RBC-ENTMCNC: 32.1 GM/DL — SIGNIFICANT CHANGE UP (ref 32–36)
MCV RBC AUTO: 92 FL — SIGNIFICANT CHANGE UP (ref 80–100)
MONOCYTES # BLD AUTO: 0.6 K/UL — SIGNIFICANT CHANGE UP (ref 0–0.9)
MONOCYTES NFR BLD AUTO: 8.3 % — SIGNIFICANT CHANGE UP (ref 2–14)
NEUTROPHILS # BLD AUTO: 4.3 K/UL — SIGNIFICANT CHANGE UP (ref 1.8–7.4)
NEUTROPHILS NFR BLD AUTO: 65.1 % — SIGNIFICANT CHANGE UP (ref 43–77)
PHOSPHATE SERPL-MCNC: 7.2 MG/DL — HIGH (ref 2.5–4.5)
PLATELET # BLD AUTO: 377 K/UL — SIGNIFICANT CHANGE UP (ref 150–400)
POTASSIUM SERPL-MCNC: 4.8 MMOL/L — SIGNIFICANT CHANGE UP (ref 3.5–5.3)
POTASSIUM SERPL-SCNC: 4.8 MMOL/L — SIGNIFICANT CHANGE UP (ref 3.5–5.3)
PROTHROM AB SERPL-ACNC: 16.5 SEC — HIGH (ref 9.8–12.7)
RBC # BLD: 3.46 M/UL — LOW (ref 4.2–5.8)
RBC # FLD: 14.3 % — SIGNIFICANT CHANGE UP (ref 10.3–14.5)
SODIUM SERPL-SCNC: 139 MMOL/L — SIGNIFICANT CHANGE UP (ref 135–145)
WBC # BLD: 6.7 K/UL — SIGNIFICANT CHANGE UP (ref 3.8–10.5)
WBC # FLD AUTO: 6.7 K/UL — SIGNIFICANT CHANGE UP (ref 3.8–10.5)

## 2017-10-11 PROCEDURE — 99232 SBSQ HOSP IP/OBS MODERATE 35: CPT

## 2017-10-11 RX ORDER — WARFARIN SODIUM 2.5 MG/1
2 TABLET ORAL ONCE
Qty: 0 | Refills: 0 | Status: COMPLETED | OUTPATIENT
Start: 2017-10-11 | End: 2017-10-11

## 2017-10-11 RX ADMIN — Medication 81 MILLIGRAM(S): at 08:06

## 2017-10-11 RX ADMIN — Medication 5 MILLIGRAM(S): at 05:01

## 2017-10-11 RX ADMIN — ERYTHROPOIETIN 10000 UNIT(S): 10000 INJECTION, SOLUTION INTRAVENOUS; SUBCUTANEOUS at 10:38

## 2017-10-11 RX ADMIN — CLOPIDOGREL BISULFATE 75 MILLIGRAM(S): 75 TABLET, FILM COATED ORAL at 08:06

## 2017-10-11 RX ADMIN — WARFARIN SODIUM 2 MILLIGRAM(S): 2.5 TABLET ORAL at 21:52

## 2017-10-11 RX ADMIN — PANTOPRAZOLE SODIUM 40 MILLIGRAM(S): 20 TABLET, DELAYED RELEASE ORAL at 08:06

## 2017-10-11 RX ADMIN — Medication 667 MILLIGRAM(S): at 12:56

## 2017-10-11 RX ADMIN — ATORVASTATIN CALCIUM 40 MILLIGRAM(S): 80 TABLET, FILM COATED ORAL at 21:51

## 2017-10-11 RX ADMIN — TAMSULOSIN HYDROCHLORIDE 0.4 MILLIGRAM(S): 0.4 CAPSULE ORAL at 21:51

## 2017-10-11 RX ADMIN — Medication 1 TABLET(S): at 08:06

## 2017-10-11 RX ADMIN — Medication 5 MILLIGRAM(S): at 17:04

## 2017-10-11 RX ADMIN — Medication 667 MILLIGRAM(S): at 17:04

## 2017-10-11 RX ADMIN — Medication 1: at 17:49

## 2017-10-11 RX ADMIN — AMIODARONE HYDROCHLORIDE 200 MILLIGRAM(S): 400 TABLET ORAL at 05:01

## 2017-10-11 RX ADMIN — Medication 667 MILLIGRAM(S): at 08:06

## 2017-10-11 RX ADMIN — Medication 25 MILLIGRAM(S): at 17:05

## 2017-10-11 NOTE — PROGRESS NOTE ADULT - PROBLEM SELECTOR PLAN 3
patient with new ESRD on HD, c/b hyperkalemia   - avoid nephrotoxins, renally dose meds  - c/w HD per nephrology; HD M/W/F  - s/p permacath 10/9 by IR -remains NSR  - c/w amiodarone 200 QD per EP; continue metoprolol  - dose coumadin 2mg, goal 2-3 INR

## 2017-10-11 NOTE — PROGRESS NOTE ADULT - SUBJECTIVE AND OBJECTIVE BOX
S/p stable HD today    Vital Signs Last 24 Hrs  T(C): 37.1 (10-11-17 @ 12:35), Max: 37.1 (10-11-17 @ 12:35)  T(F): 98.8 (10-11-17 @ 12:35), Max: 98.8 (10-11-17 @ 12:35)  HR: 99 (10-11-17 @ 12:35) (74 - 99)  BP: 121/73 (10-11-17 @ 12:35) (92/61 - 121/73)  BP(mean): --  RR: 18 (10-11-17 @ 12:35) (16 - 18)  SpO2: 97% (10-11-17 @ 12:35) (97% - 98%)             amiodarone    Tablet 200 milliGRAM(s) Oral daily  aspirin  chewable 81 milliGRAM(s) Oral daily  atorvastatin 40 milliGRAM(s) Oral at bedtime  busPIRone 5 milliGRAM(s) Oral two times a day  calcium acetate 667 milliGRAM(s) Oral three times a day with meals  clopidogrel Tablet 75 milliGRAM(s) Oral daily  dextrose 5%. 1000 milliLiter(s) IV Continuous <Continuous>  dextrose 50% Injectable 12.5 Gram(s) IV Push once  dextrose 50% Injectable 25 Gram(s) IV Push once  dextrose 50% Injectable 25 Gram(s) IV Push once  dextrose Gel 1 Dose(s) Oral once PRN  epoetin georgie Injectable 48949 Unit(s) IV Push every other day  glucagon  Injectable 1 milliGRAM(s) IntraMuscular once PRN  insulin lispro (HumaLOG) corrective regimen sliding scale   SubCutaneous three times a day before meals  insulin lispro (HumaLOG) corrective regimen sliding scale   SubCutaneous at bedtime  metoprolol 25 milliGRAM(s) Oral two times a day  Nephro-sophie 1 Tablet(s) Oral daily  pantoprazole  Injectable 40 milliGRAM(s) IV Push daily  tamsulosin 0.4 milliGRAM(s) Oral at bedtime  warfarin 2 milliGRAM(s) Oral once      Respiratory: good air entry  Cardiovascular: S1 S2   Gastrointestinal: soft, ND, BS present  Extremities:  no edema                          10.2   6.7   )-----------( 377      ( 11 Oct 2017 06:10 )             31.8     11 Oct 2017 06:10    139    |  95     |  92     ----------------------------<  131    4.8     |  23     |  7.46     Ca    9.1        11 Oct 2017 06:10  Phos  7.2       11 Oct 2017 06:10  Mg     2.4       11 Oct 2017 06:10        Assessment and Plan:   		  CM, NSTEMI, s/p cath, PCI  S/p arrest, intubation  PAfib, s/p CVA  S/p LIN on CKD in setting of non-fx L kidney and R hydro due to stone plus hemodynamic/dye injury  Started on HD in [facility]  Subsequently passed stone and hydro resolved, but no renal fx recovery so far  On HD MWF via perm cath  Procrit w/HD  Renal diet  While most likely pt will require long term dialysis, would avoid ACE/ARB or other nephrotoxins for now  D/c planning to rehab when ready

## 2017-10-11 NOTE — PROGRESS NOTE ADULT - ASSESSMENT
73 yo M w/PMH of extensive CAD, T2DM, CKD, prior CVA transferred from OSH for NSTEMI s/p Impella-assisted LHC w/BABAR x 5 and obstructive nephropathy requiring HD transferred from CCU after PEA/Vtach arrest with ROSC c/b new embolic CVA. S/p permacath placement by IR on 10/9, pending placement to rehab 73 yo M w/PMH of extensive CAD, T2DM, CKD, prior CVA transferred from OSH for NSTEMI s/p Impella-assisted LHC w/BABAR x 5 and obstructive nephropathy requiring HD transferred from CCU after PEA/Vtach arrest with ROSC c/b new embolic CVA. S/p permacath placement by IR on 10/9, family does not wish to PPM at this time, pending placement to acute rehab

## 2017-10-11 NOTE — PROGRESS NOTE ADULT - ASSESSMENT
73 yo M w/PMH of extensive CAD, prior CVA, T2DM, ESRD HD via permacath. Transferred from OSH for N-STEMI and pyelonephritis, with course c/b obstructive nephropathy, s/p impella assisted LHC with DESx5 who suffered cardiac arrest, V. Tach, and subsequent ROSC w/ hospital course further c/b embolic CVA has now right hemiplegia, and nonverbal.         # NSTEMI s/p Impella-assisted Cath with BABAR x5 to LAD/RCA; complicated by subsequent VT arrest  - c/w DAPT, statin, and beta-blocker  - per renal, cont holding  ACE/ARB given    # ICM (EF~20%)- NYHA 2-3, ACC/AHA C-currently euvolemic  - In the setting of ESRD and demonstrating dialysis requirements - we can start optimizing her heart failure regimen. If it is okay from renal we can begin introducing ACE to help with after load  - c/w metoprolol at 25mg bid  - Until further notice, appears family is unwilling to continue with PPM. Family not at bedside this morning. Will speak with EP to confirm    # Atrial fibrillation/flutter -currently in NSR  - cont PO amio 200mg QD  - would resume warfarin if no plans for PPM    # CVA (cerebral vascular accident) w/ CTH (9/23) demonstrating multifocal infarcts concerning for embolic strokes  - c/w coumadin  - c/w atorvastatin  - PT/OT following 71 yo M w/PMH of extensive CAD, prior CVA, T2DM, ESRD HD via permacath. Transferred from OSH for N-STEMI and pyelonephritis, with course c/b obstructive nephropathy, s/p impella assisted LHC with DESx5 who suffered cardiac arrest, V. Tach, and subsequent ROSC w/ hospital course further c/b embolic CVA has now right hemiplegia, and nonverbal.         # NSTEMI s/p Impella-assisted Cath with BABAR x5 to LAD/RCA; complicated by subsequent VT arrest  - c/w DAPT, statin, and beta-blocker  - per renal, cont holding  ACE/ARB given    # ICM (EF~20%)- NYHA 2-3, ACC/AHA C-currently euvolemic  - In the setting of ESRD and demonstrating dialysis requirements - we can start optimizing her heart failure regimen. If it is okay from renal we can begin introducing ACE to help with after load. This does not have to hold up discharge planning, however.  - c/w metoprolol at 25mg bid  - Until further notice, appears family is unwilling to continue with PPM. Family not at bedside this morning. Will speak with EP to confirm    # Atrial fibrillation/flutter -currently in NSR  - cont PO amio 200mg QD  - would resume warfarin if no plans for PPM    # CVA (cerebral vascular accident) w/ CTH (9/23) demonstrating multifocal infarcts concerning for embolic strokes  - c/w coumadin  - c/w atorvastatin  - PT/OT following 73 yo M w/PMH of extensive CAD, prior CVA, T2DM, ESRD HD via permacath.   Transferred from OSH for N-STEMI and pyelonephritis, with course c/b obstructive nephropathy  S/P impella assisted LHC with DESx5.  Suffered cardiac arrest, V. Tach, and subsequent ROSC w/ hospital course further c/b embolic CVA has now right hemiplegia, and nonverbal.         # NSTEMI s/p Impella-assisted Cath with BABAR x5 to LAD/RCA; complicated by subsequent VT arrest  - c/w DAPT, statin, and beta-blocker    # ICM (EF~20%)- NYHA 2-3, ACC/AHA C-currently euvolemic  - Renal failure on HD.  Nephrology advises against ACE or ARB at this time.   - c/w metoprolol at 25mg bid  - Until further notice, appears family is unwilling to continue with PPM. Family not at bedside this morning. Will speak with EP to confirm    # Atrial fibrillation/flutter -currently in NSR  - cont PO amio 200mg QD  - would resume warfarin if no plans for PPM    # CVA (cerebral vascular accident) w/ CTH (9/23) demonstrating multifocal infarcts concerning for embolic strokes  - c/w coumadin  - c/w atorvastatin  - PT/OT following       RENATE Villalobos M.D.  [id number]    Patient seen and examined; discussed with cardiology fellow, Dr. Villalobos. Hx., exam and labs as above.  I agree with the assessment and recommendations.   Erick Roy M.D.  Cardiology Consult Service  [phone] or [phone]

## 2017-10-11 NOTE — PROGRESS NOTE ADULT - ASSESSMENT
72yoM, hx of CAD (DESx18), CKDm DM2, COPD, CVA (remote, no deficits), Bipolar (not on meds). Presented with flank pain, developed NSTEMI, hypertensive emergency,  TWI. [facility] on  with BABAR to pRCA.   had cardiac arrest, tele shows bradycardia, evolving intop VF.  18 minutes CPR, intubated.    with new CVA dx, new aphasia, weakness.  EF 20%      Bradycardia mediated VF arrest.   - I spoke with the patient daughter. her mother just  and she would like to not have ppm today.   - suggest ppm prior to dc  - EP to continue to follow  - please maintain K>4 , Mg>2  - For any questions or If there are any concerns, please call [phone] during daytime, covered by [phone] after-hours

## 2017-10-11 NOTE — PROGRESS NOTE ADULT - PROBLEM SELECTOR PLAN 2
-remains NSR  - c/w amiodarone 200 QD per EP; continue metoprolol  - dose coumadin 2mg, goal 2-3 INR patient with CTH (9/23) demonstrating multifocal infarcts concerning for embolic strokes  - c/w aspirin, plavix, atorvastatin  - PT/OT following  - pending placement to acute rehab

## 2017-10-11 NOTE — PROGRESS NOTE ADULT - SUBJECTIVE AND OBJECTIVE BOX
Interval History: No acute issues overnight. Based on IM note - apparently pt's family now refusing PPM.      Review Of Systems:  Constitutional: [ ] Fever [ ] Chills [ ] Fatigue [ ] Weight change   HEENT: [ ] Blurred vision [ ] Eye Pain [ ] Headache [ ] Runny nose [ ] Sore Throat   Respiratory: [ ] Cough [ ] Wheezing [ ] Shortness of breath  Cardiovascular: [ ] Chest Pain [ ] Palpitations [ ] CHAMPION [ ] PND [ ] Orthopnea  Gastrointestinal: [ ] Abdominal Pain [ ] Diarrhea [ ] Constipation [ ] Hemorrhoids [ ] Nausea [ ] Vomiting  Genitourinary: [ ] Nocturia [ ] Dysuria [ ] Incontinence  Extremities: [ ] Swelling [ ] Joint Pain  Neurologic: [ ] Focal deficit [ ] Paresthesias [ ] Syncope  Lymphatic: [ ] Swelling [ ] Lymphadenopathy   Skin: [ ] Rash [ ] Ecchymoses [ ] Wounds [ ] Lesions  Psychiatry: [ ] Depression [ ] Suicidal/Homicidal Ideation [ ] Anxiety [ ] Sleep Disturbances  [ ] 10 point review of systems is otherwise negative except as mentioned above            [ x]Unable to obtain    Medications:  amiodarone    Tablet 200 milliGRAM(s) Oral daily  aspirin  chewable 81 milliGRAM(s) Oral daily  atorvastatin 40 milliGRAM(s) Oral at bedtime  busPIRone 5 milliGRAM(s) Oral two times a day  calcium acetate 667 milliGRAM(s) Oral three times a day with meals  clopidogrel Tablet 75 milliGRAM(s) Oral daily  dextrose 5%. 1000 milliLiter(s) IV Continuous <Continuous>  dextrose 50% Injectable 12.5 Gram(s) IV Push once  dextrose 50% Injectable 25 Gram(s) IV Push once  dextrose 50% Injectable 25 Gram(s) IV Push once  dextrose Gel 1 Dose(s) Oral once PRN  epoetin georgie Injectable 80875 Unit(s) IV Push every other day  glucagon  Injectable 1 milliGRAM(s) IntraMuscular once PRN  insulin lispro (HumaLOG) corrective regimen sliding scale   SubCutaneous three times a day before meals  insulin lispro (HumaLOG) corrective regimen sliding scale   SubCutaneous at bedtime  metoprolol 25 milliGRAM(s) Oral two times a day  Nephro-sophie 1 Tablet(s) Oral daily  pantoprazole  Injectable 40 milliGRAM(s) IV Push daily  tamsulosin 0.4 milliGRAM(s) Oral at bedtime      Vitals:  T(C): 37 (10-11-17 @ 04:46), Max: 37 (10-11-17 @ 04:46)  HR: 81 (10-11-17 @ 04:46) (74 - 85)  BP: 99/66 (10-11-17 @ 04:46) (92/61 - 127/82)  BP(mean): --  RR: 18 (10-11-17 @ 04:46) (18 - 18)  SpO2: 97% (10-11-17 @ 04:46) (96% - 97%)  Wt(kg): --  Daily     Daily Weight in k.2 (11 Oct 2017 04:46)  I&O's Summary    10 Oct 2017 07:01  -  11 Oct 2017 07:00  --------------------------------------------------------  IN: 900 mL / OUT: 0 mL / NET: 900 mL        Physical Exam:  · CONSTITUTIONAL:  Nonverbal.  Lies in bed.  Limited response to commands.  Lethargic. Restricted facial expression  · NECK:	No bruits;  · RESPIRATORY:  Minimal breath sounds.  CTA bilaterally.  No rhonchi, wheezes, rales appreciated   · CARDIOVASCULAR:   PMI not displaced.  Regular rate and rhythm.  S1/S2 normal.  No rub, murmur or gallop.  · EXTREMITIES: No cyanosis, clubbing or edema  · VASCULAR: 	Equal and normal pulses (carotid, femoral, dorsalis pedis)  · NEUROLOGICAL:  Patient non-verbal. Opens eyes after shaking his hand and repeatedly saying his name.  Does not respond to command with .       Labs:                        10.2   6.9   )-----------( 370      ( 10 Oct 2017 05:52 )             30.8     10-11    139  |  95<L>  |  92<H>  ----------------------------<  131<H>  4.8   |  23  |  7.46<H>    Ca    9.1      11 Oct 2017 06:10  Phos  7.2     10-11  Mg     2.4     10-11      PT/INR - ( 11 Oct 2017 06:10 )   PT: 16.5 sec;   INR: 1.50 ratio          Interpretation of Telemetry: SR 70-80s Interval History: No acute issues overnight. Based on IM note - apparently pt's family now refusing PPM.      Review Of Systems:  Constitutional: [ ] Fever [ ] Chills [ ] Fatigue [ ] Weight change   HEENT: [ ] Blurred vision [ ] Eye Pain [ ] Headache [ ] Runny nose [ ] Sore Throat   Respiratory: [ ] Cough [ ] Wheezing [ ] Shortness of breath  Cardiovascular: [ ] Chest Pain [ ] Palpitations [ ] CHAMPION [ ] PND [ ] Orthopnea  Gastrointestinal: [ ] Abdominal Pain [ ] Diarrhea [ ] Constipation [ ] Hemorrhoids [ ] Nausea [ ] Vomiting  Genitourinary: [ ] Nocturia [ ] Dysuria [ ] Incontinence  Extremities: [ ] Swelling [ ] Joint Pain  Neurologic: [ ] Focal deficit [ ] Paresthesias [ ] Syncope  Lymphatic: [ ] Swelling [ ] Lymphadenopathy   Skin: [ ] Rash [ ] Ecchymoses [ ] Wounds [ ] Lesions  Psychiatry: [ ] Depression [ ] Suicidal/Homicidal Ideation [ ] Anxiety [ ] Sleep Disturbances  [ ] 10 point review of systems is otherwise negative except as mentioned above             Medications:  amiodarone    Tablet 200 milliGRAM(s) Oral daily  aspirin  chewable 81 milliGRAM(s) Oral daily  atorvastatin 40 milliGRAM(s) Oral at bedtime  busPIRone 5 milliGRAM(s) Oral two times a day  calcium acetate 667 milliGRAM(s) Oral three times a day with meals  clopidogrel Tablet 75 milliGRAM(s) Oral daily  dextrose 5%. 1000 milliLiter(s) IV Continuous <Continuous>  dextrose 50% Injectable 12.5 Gram(s) IV Push once  dextrose 50% Injectable 25 Gram(s) IV Push once  dextrose 50% Injectable 25 Gram(s) IV Push once  dextrose Gel 1 Dose(s) Oral once PRN  epoetin georgie Injectable 56858 Unit(s) IV Push every other day  glucagon  Injectable 1 milliGRAM(s) IntraMuscular once PRN  insulin lispro (HumaLOG) corrective regimen sliding scale   SubCutaneous three times a day before meals  insulin lispro (HumaLOG) corrective regimen sliding scale   SubCutaneous at bedtime  metoprolol 25 milliGRAM(s) Oral two times a day  Nephro-sophie 1 Tablet(s) Oral daily  pantoprazole  Injectable 40 milliGRAM(s) IV Push daily  tamsulosin 0.4 milliGRAM(s) Oral at bedtime    Vitals:  T(C): 37 (10-11-17 @ 04:46), Max: 37 (10-11-17 @ 04:46)  HR: 81 (10-11-17 @ 04:46) (74 - 85)  BP: 99/66 (10-11-17 @ 04:46) (92/61 - 127/82)  RR: 18 (10-11-17 @ 04:46) (18 - 18)  SpO2: 97% (10-11-17 @ 04:46) (96% - 97%)  Daily Weight in k.2 (11 Oct 2017 04:46)    Physical Exam:  · CONSTITUTIONAL:  Nonverbal.  Lies in bed.  Limited response to commands.  Lethargic. Restricted facial expression  · NECK:	No bruits;  · RESPIRATORY:  Minimal breath sounds.  CTA bilaterally.  No rhonchi, wheezes, rales appreciated   · CARDIOVASCULAR:   PMI not displaced.  Regular rate and rhythm.  S1/S2 normal.  No rub, murmur or gallop.  · EXTREMITIES: No cyanosis, clubbing or edema  · VASCULAR: 	Equal and normal pulses (carotid, femoral, dorsalis pedis)  · NEUROLOGICAL:  Patient non-verbal. Opens eyes after shaking his hand and repeatedly saying his name.  Does not respond to command with .       Interpretation of Telemetry: SR 70-80s    Labs:                        10.2   6.9   )-----------( 370      ( 10 Oct 2017 05:52 )             30.8     10-11    139  |  95<L>  |  92<H>  ----------------------------<  131<H>  4.8   |  23  |  7.46<H>    Ca    9.1      11 Oct 2017 06:10  Phos  7.2     10-11  Mg     2.4     10-11    PT/INR - ( 11 Oct 2017 06:10 )   PT: 16.5 sec;   INR: 1.50 ratio

## 2017-10-11 NOTE — PROGRESS NOTE ADULT - SUBJECTIVE AND OBJECTIVE BOX
24H hour events:  No acute events, remains aphasic    MEDICATIONS:  amiodarone    Tablet 200 milliGRAM(s) Oral daily  aspirin  chewable 81 milliGRAM(s) Oral daily  clopidogrel Tablet 75 milliGRAM(s) Oral daily  metoprolol 25 milliGRAM(s) Oral two times a day  tamsulosin 0.4 milliGRAM(s) Oral at bedtime  warfarin 2 milliGRAM(s) Oral once        busPIRone 5 milliGRAM(s) Oral two times a day    pantoprazole  Injectable 40 milliGRAM(s) IV Push daily    atorvastatin 40 milliGRAM(s) Oral at bedtime  dextrose 50% Injectable 12.5 Gram(s) IV Push once  dextrose 50% Injectable 25 Gram(s) IV Push once  dextrose 50% Injectable 25 Gram(s) IV Push once  dextrose Gel 1 Dose(s) Oral once PRN  glucagon  Injectable 1 milliGRAM(s) IntraMuscular once PRN  insulin lispro (HumaLOG) corrective regimen sliding scale   SubCutaneous three times a day before meals  insulin lispro (HumaLOG) corrective regimen sliding scale   SubCutaneous at bedtime    calcium acetate 667 milliGRAM(s) Oral three times a day with meals  dextrose 5%. 1000 milliLiter(s) IV Continuous <Continuous>  epoetin georgie Injectable 96049 Unit(s) IV Push every other day  Nephro-sophie 1 Tablet(s) Oral daily        PHYSICAL EXAM:  T(C): 37.1 (10-11-17 @ 12:35), Max: 37.1 (10-11-17 @ 12:35)  HR: 99 (10-11-17 @ 12:35) (74 - 99)  BP: 121/73 (10-11-17 @ 12:35) (92/61 - 121/73)  RR: 18 (10-11-17 @ 12:35) (16 - 18)  SpO2: 97% (10-11-17 @ 12:35) (97% - 98%)  Wt(kg): --  I&O's Summary    10 Oct 2017 07:01  -  11 Oct 2017 07:00  --------------------------------------------------------  IN: 900 mL / OUT: 0 mL / NET: 900 mL    11 Oct 2017 07:01  -  11 Oct 2017 15:07  --------------------------------------------------------  IN: 420 mL / OUT: 500 mL / NET: -80 mL        Appearance: Normal	  HEENT:   Normal oral mucosa  Lymphatic: No lymphadenopathy  Cardiovascular: Normal S1 S2,         No JVD,      No murmurs,      No edema  Respiratory: Lungs clear to auscultation	  Psychiatry: Mood & affect appropriate  Gastrointestinal:  Soft, Non-tender	  Skin: No rashes, No ecchymoses, No cyanosis	  Neurologic: Non-focal  Extremities: Normal range of motion, No clubbing, cyanosis   Vascular: warm extremities        LABS:	 	    CBC Full  -  ( 11 Oct 2017 06:10 )  WBC Count : 6.7 K/uL  Hemoglobin : 10.2 g/dL  Hematocrit : 31.8 %  Platelet Count - Automated : 377 K/uL  Mean Cell Volume : 92.0 fl  Mean Cell Hemoglobin : 29.6 pg  Mean Cell Hemoglobin Concentration : 32.1 gm/dL  Auto Neutrophil # : 4.3 K/uL  Auto Lymphocyte # : 1.5 K/uL  Auto Monocyte # : 0.6 K/uL  Auto Eosinophil # : 0.2 K/uL  Auto Basophil # : 0.1 K/uL  Auto Neutrophil % : 65.1 %  Auto Lymphocyte % : 23.0 %  Auto Monocyte % : 8.3 %  Auto Eosinophil % : 2.6 %  Auto Basophil % : 1.0 %    10-11    139  |  95<L>  |  92<H>  ----------------------------<  131<H>  4.8   |  23  |  7.46<H>  10-10    140  |  96  |  51<H>  ----------------------------<  144<H>  4.7   |  25  |  5.24<H>    Ca    9.1      11 Oct 2017 06:10  Ca    9.3      10 Oct 2017 05:52  Phos  7.2     10-11  Phos  5.4     10-10  Mg     2.4     10-11  Mg     2.2     10-10        proBNP:     TELEMETRY: 	  SR 80

## 2017-10-11 NOTE — PROGRESS NOTE ADULT - ATTENDING COMMENTS
Agree with R1/R2 resident plan of care  Unchaged status, not in distress, afebrile  - spoke to Bother at bedside, gave detail plan of care  Acute rehab didn't accept him, will apply for [facility]  - c/w coumadin, asa, plavix, BB, statin. NO ACE I/ARB for frequent hyperkalemia  - PPM after Rehab per family  - HD per renal Agree with R1/R2 resident plan of care  Unchaged status, not in distress, afebrile  - spoke to Bother at bedside, gave detail plan of care  Acute rehab didn't accept him, will apply for [facility]  - c/w coumadin, asa, plavix, BB, statin. NO ACE I/ARB for frequent hyperkalemia  - PPM after Rehab per family  - HD per renal  - DNR

## 2017-10-11 NOTE — PROGRESS NOTE ADULT - PROBLEM SELECTOR PLAN 4
patient with CTH (9/23) demonstrating multifocal infarcts concerning for embolic strokes  - c/w aspirin, plavix, atorvastatin  - PT/OT following patient with new ESRD on HD, c/b hyperkalemia   - avoid nephrotoxins, renally dose meds  - c/w HD per nephrology; HD M/W/F  - s/p permacath 10/9 by IR

## 2017-10-11 NOTE — PROGRESS NOTE ADULT - SUBJECTIVE AND OBJECTIVE BOX
Patient is a 72y old  Male who presents with a chief complaint of s/p cardiac cath (22 Sep 2017 12:34)        SUBJECTIVE / OVERNIGHT EVENTS: D/w daughter yesterday for PPM, she wants to wait until "he is stronger." She understands he will not remain in hospital and will have to coordinate for outpatient procedure. [place]  service used( Yelena #[id number], [place] ). Pt remained nonverbal, responded to verbal command with movement of LUE.       MEDICATIONS  (STANDING):  amiodarone    Tablet 200 milliGRAM(s) Oral daily  aspirin  chewable 81 milliGRAM(s) Oral daily  atorvastatin 40 milliGRAM(s) Oral at bedtime  busPIRone 5 milliGRAM(s) Oral two times a day  calcium acetate 667 milliGRAM(s) Oral three times a day with meals  clopidogrel Tablet 75 milliGRAM(s) Oral daily  dextrose 5%. 1000 milliLiter(s) (50 mL/Hr) IV Continuous <Continuous>  dextrose 50% Injectable 12.5 Gram(s) IV Push once  dextrose 50% Injectable 25 Gram(s) IV Push once  dextrose 50% Injectable 25 Gram(s) IV Push once  epoetin georgie Injectable 36456 Unit(s) IV Push every other day  insulin lispro (HumaLOG) corrective regimen sliding scale   SubCutaneous three times a day before meals  insulin lispro (HumaLOG) corrective regimen sliding scale   SubCutaneous at bedtime  metoprolol 25 milliGRAM(s) Oral two times a day  Nephro-sophie 1 Tablet(s) Oral daily  pantoprazole  Injectable 40 milliGRAM(s) IV Push daily  tamsulosin 0.4 milliGRAM(s) Oral at bedtime  warfarin 2 milliGRAM(s) Oral once    MEDICATIONS  (PRN):  dextrose Gel 1 Dose(s) Oral once PRN Blood Glucose LESS THAN 70 milliGRAM(s)/deciLiter  glucagon  Injectable 1 milliGRAM(s) IntraMuscular once PRN Glucose <70 milliGRAM(s)/deciLiter      T(C): 36.6 (10-11-17 @ 08:20), Max: 37 (10-11-17 @ 04:46)  HR: 85 (10-11-17 @ 08:20) (74 - 85)  BP: 107/61 (10-11-17 @ 08:20) (92/61 - 127/82)  RR: 16 (10-11-17 @ 08:20) (16 - 18)  SpO2: 98% (10-11-17 @ 08:20) (96% - 98%)  CAPILLARY BLOOD GLUCOSE      POCT Blood Glucose.: 123 mg/dL (11 Oct 2017 08:04)  POCT Blood Glucose.: 161 mg/dL (10 Oct 2017 22:32)  POCT Blood Glucose.: 171 mg/dL (10 Oct 2017 17:49)  POCT Blood Glucose.: 210 mg/dL (10 Oct 2017 13:00)    I&O's Summary    10 Oct 2017 07:01  -  11 Oct 2017 07:00  --------------------------------------------------------  IN: 900 mL / OUT: 0 mL / NET: 900 mL        PHYSICAL EXAM  GENERAL: NAD, obese  HEAD:  Atraumatic, Normocephalic  EYES: EOMI, PERRLA, conjunctiva and sclera clear  NECK: Supple, No JVD  CHEST/LUNG: Clear to auscultation bilaterally; No wheeze  HEART: Regular rate and rhythm; No murmurs, rubs, or gallops  ABDOMEN: Soft, Nontender, Nondistended; Bowel sounds present  EXTREMITIES:  2+ Peripheral Pulses, No clubbing, cyanosis, or edema  PSYCH: nonverbal  SKIN: ecchymosis throughout left side, RIJ permacath in place    LABS:                        10.2   6.7   )-----------( 377      ( 11 Oct 2017 06:10 )             31.8     10-11    139  |  95<L>  |  92<H>  ----------------------------<  131<H>  4.8   |  23  |  7.46<H>    Ca    9.1      11 Oct 2017 06:10  Phos  7.2     10-11  Mg     2.4     10-11      PT/INR - ( 11 Oct 2017 06:10 )   PT: 16.5 sec;   INR: 1.50 ratio                   RADIOLOGY & ADDITIONAL TESTS:    Imaging Personally Reviewed:  Consultant(s) Notes Reviewed:  Cardiology, Nephrology, EP  Care Discussed with Consultants/Other Providers: EP, nephro Patient is a 72y old  Male who presents with a chief complaint of s/p cardiac cath (22 Sep 2017 12:34)        SUBJECTIVE / OVERNIGHT EVENTS: D/w daughter yesterday for PPM, she wants to wait until "he is stronger." She understands he will not remain in hospital and will have to coordinate for outpatient procedure. [place]  service used( Yelena #[id number], [place] ). Pt remained nonverbal, responded to verbal command with movement of LUE.       MEDICATIONS  (STANDING):  amiodarone    Tablet 200 milliGRAM(s) Oral daily  aspirin  chewable 81 milliGRAM(s) Oral daily  atorvastatin 40 milliGRAM(s) Oral at bedtime  busPIRone 5 milliGRAM(s) Oral two times a day  calcium acetate 667 milliGRAM(s) Oral three times a day with meals  clopidogrel Tablet 75 milliGRAM(s) Oral daily  dextrose 5%. 1000 milliLiter(s) (50 mL/Hr) IV Continuous <Continuous>  dextrose 50% Injectable 12.5 Gram(s) IV Push once  dextrose 50% Injectable 25 Gram(s) IV Push once  dextrose 50% Injectable 25 Gram(s) IV Push once  epoetin georgie Injectable 60329 Unit(s) IV Push every other day  insulin lispro (HumaLOG) corrective regimen sliding scale   SubCutaneous three times a day before meals  insulin lispro (HumaLOG) corrective regimen sliding scale   SubCutaneous at bedtime  metoprolol 25 milliGRAM(s) Oral two times a day  Nephro-sophie 1 Tablet(s) Oral daily  pantoprazole  Injectable 40 milliGRAM(s) IV Push daily  tamsulosin 0.4 milliGRAM(s) Oral at bedtime  warfarin 2 milliGRAM(s) Oral once    MEDICATIONS  (PRN):  dextrose Gel 1 Dose(s) Oral once PRN Blood Glucose LESS THAN 70 milliGRAM(s)/deciLiter  glucagon  Injectable 1 milliGRAM(s) IntraMuscular once PRN Glucose <70 milliGRAM(s)/deciLiter      T(C): 36.6 (10-11-17 @ 08:20), Max: 37 (10-11-17 @ 04:46)  HR: 85 (10-11-17 @ 08:20) (74 - 85)  BP: 107/61 (10-11-17 @ 08:20) (92/61 - 127/82)  RR: 16 (10-11-17 @ 08:20) (16 - 18)  SpO2: 98% (10-11-17 @ 08:20) (96% - 98%)  CAPILLARY BLOOD GLUCOSE      POCT Blood Glucose.: 123 mg/dL (11 Oct 2017 08:04)  POCT Blood Glucose.: 161 mg/dL (10 Oct 2017 22:32)  POCT Blood Glucose.: 171 mg/dL (10 Oct 2017 17:49)  POCT Blood Glucose.: 210 mg/dL (10 Oct 2017 13:00)    I&O's Summary    10 Oct 2017 07:01  -  11 Oct 2017 07:00  --------------------------------------------------------  IN: 900 mL / OUT: 0 mL / NET: 900 mL        PHYSICAL EXAM  GENERAL: NAD, obese  HEAD:  Atraumatic, Normocephalic  EYES: EOMI, PERRLA, conjunctiva and sclera clear  NECK: Supple, No JVD  CHEST/LUNG: Clear to auscultation bilaterally; No wheeze  HEART: Regular rate and rhythm; No murmurs, rubs, or gallops  ABDOMEN: Soft, Nontender, Nondistended; Bowel sounds present  EXTREMITIES:  2+ Peripheral Pulses, No clubbing, cyanosis, or edema, right hemiplegia  NEURO: nonverbal, right hemiplegia  SKIN: ecchymosis throughout left side, RIJ permacath in place    LABS:                        10.2   6.7   )-----------( 377      ( 11 Oct 2017 06:10 )             31.8     10-11    139  |  95<L>  |  92<H>  ----------------------------<  131<H>  4.8   |  23  |  7.46<H>    Ca    9.1      11 Oct 2017 06:10  Phos  7.2     10-11  Mg     2.4     10-11      PT/INR - ( 11 Oct 2017 06:10 )   PT: 16.5 sec;   INR: 1.50 ratio             RADIOLOGY & ADDITIONAL TESTS:    Imaging Personally Reviewed:  Consultant(s) Notes Reviewed:  Cardiology, Nephrology, EP  Care Discussed with Consultants/Other Providers: EP, nephro

## 2017-10-11 NOTE — PROGRESS NOTE ADULT - PROBLEM SELECTOR PLAN 1
Patient with worsening systolic dysfunction s/p NSTEMI/arrest.   - daughter does not want PPM at this time  - c/w fluid removal by intermittent HD  - c/w metoprolol 25mg BID  - cardio following

## 2017-10-12 DIAGNOSIS — L98.421 NON-PRESSURE CHRONIC ULCER OF BACK LIMITED TO BREAKDOWN OF SKIN: ICD-10-CM

## 2017-10-12 LAB
ANION GAP SERPL CALC-SCNC: 20 MMOL/L — HIGH (ref 5–17)
BASOPHILS # BLD AUTO: 0.1 K/UL — SIGNIFICANT CHANGE UP (ref 0–0.2)
BASOPHILS NFR BLD AUTO: 1.2 % — SIGNIFICANT CHANGE UP (ref 0–2)
BUN SERPL-MCNC: 59 MG/DL — HIGH (ref 7–23)
CALCIUM SERPL-MCNC: 9 MG/DL — SIGNIFICANT CHANGE UP (ref 8.4–10.5)
CHLORIDE SERPL-SCNC: 98 MMOL/L — SIGNIFICANT CHANGE UP (ref 96–108)
CO2 SERPL-SCNC: 22 MMOL/L — SIGNIFICANT CHANGE UP (ref 22–31)
CREAT SERPL-MCNC: 5.41 MG/DL — HIGH (ref 0.5–1.3)
EOSINOPHIL # BLD AUTO: 0.2 K/UL — SIGNIFICANT CHANGE UP (ref 0–0.5)
EOSINOPHIL NFR BLD AUTO: 2.7 % — SIGNIFICANT CHANGE UP (ref 0–6)
GLUCOSE BLDC GLUCOMTR-MCNC: 142 MG/DL — HIGH (ref 70–99)
GLUCOSE BLDC GLUCOMTR-MCNC: 148 MG/DL — HIGH (ref 70–99)
GLUCOSE BLDC GLUCOMTR-MCNC: 159 MG/DL — HIGH (ref 70–99)
GLUCOSE BLDC GLUCOMTR-MCNC: 178 MG/DL — HIGH (ref 70–99)
GLUCOSE SERPL-MCNC: 116 MG/DL — HIGH (ref 70–99)
HCT VFR BLD CALC: 30.2 % — LOW (ref 39–50)
HGB BLD-MCNC: 9.7 G/DL — LOW (ref 13–17)
INR BLD: 1.51 RATIO — HIGH (ref 0.88–1.16)
LYMPHOCYTES # BLD AUTO: 1.5 K/UL — SIGNIFICANT CHANGE UP (ref 1–3.3)
LYMPHOCYTES # BLD AUTO: 22.8 % — SIGNIFICANT CHANGE UP (ref 13–44)
MAGNESIUM SERPL-MCNC: 2.2 MG/DL — SIGNIFICANT CHANGE UP (ref 1.6–2.6)
MCHC RBC-ENTMCNC: 29.6 PG — SIGNIFICANT CHANGE UP (ref 27–34)
MCHC RBC-ENTMCNC: 32 GM/DL — SIGNIFICANT CHANGE UP (ref 32–36)
MCV RBC AUTO: 92.5 FL — SIGNIFICANT CHANGE UP (ref 80–100)
MONOCYTES # BLD AUTO: 0.6 K/UL — SIGNIFICANT CHANGE UP (ref 0–0.9)
MONOCYTES NFR BLD AUTO: 8.5 % — SIGNIFICANT CHANGE UP (ref 2–14)
NEUTROPHILS # BLD AUTO: 4.2 K/UL — SIGNIFICANT CHANGE UP (ref 1.8–7.4)
NEUTROPHILS NFR BLD AUTO: 64.9 % — SIGNIFICANT CHANGE UP (ref 43–77)
PHOSPHATE SERPL-MCNC: 5 MG/DL — HIGH (ref 2.5–4.5)
PLATELET # BLD AUTO: 338 K/UL — SIGNIFICANT CHANGE UP (ref 150–400)
POTASSIUM SERPL-MCNC: 4.4 MMOL/L — SIGNIFICANT CHANGE UP (ref 3.5–5.3)
POTASSIUM SERPL-SCNC: 4.4 MMOL/L — SIGNIFICANT CHANGE UP (ref 3.5–5.3)
PROTHROM AB SERPL-ACNC: 16.5 SEC — HIGH (ref 9.8–12.7)
RBC # BLD: 3.26 M/UL — LOW (ref 4.2–5.8)
RBC # FLD: 14.3 % — SIGNIFICANT CHANGE UP (ref 10.3–14.5)
SODIUM SERPL-SCNC: 140 MMOL/L — SIGNIFICANT CHANGE UP (ref 135–145)
WBC # BLD: 6.6 K/UL — SIGNIFICANT CHANGE UP (ref 3.8–10.5)
WBC # FLD AUTO: 6.6 K/UL — SIGNIFICANT CHANGE UP (ref 3.8–10.5)

## 2017-10-12 PROCEDURE — 99232 SBSQ HOSP IP/OBS MODERATE 35: CPT

## 2017-10-12 RX ORDER — ASPIRIN/CALCIUM CARB/MAGNESIUM 324 MG
81 TABLET ORAL DAILY
Qty: 0 | Refills: 0 | Status: DISCONTINUED | OUTPATIENT
Start: 2017-10-12 | End: 2017-10-18

## 2017-10-12 RX ORDER — WARFARIN SODIUM 2.5 MG/1
3 TABLET ORAL ONCE
Qty: 0 | Refills: 0 | Status: COMPLETED | OUTPATIENT
Start: 2017-10-12 | End: 2017-10-12

## 2017-10-12 RX ADMIN — Medication 1 TABLET(S): at 11:57

## 2017-10-12 RX ADMIN — Medication 25 MILLIGRAM(S): at 18:24

## 2017-10-12 RX ADMIN — Medication 667 MILLIGRAM(S): at 18:47

## 2017-10-12 RX ADMIN — Medication 5 MILLIGRAM(S): at 18:24

## 2017-10-12 RX ADMIN — Medication 667 MILLIGRAM(S): at 09:38

## 2017-10-12 RX ADMIN — Medication 81 MILLIGRAM(S): at 13:18

## 2017-10-12 RX ADMIN — Medication 25 MILLIGRAM(S): at 06:22

## 2017-10-12 RX ADMIN — Medication 1: at 13:18

## 2017-10-12 RX ADMIN — AMIODARONE HYDROCHLORIDE 200 MILLIGRAM(S): 400 TABLET ORAL at 06:22

## 2017-10-12 RX ADMIN — Medication 667 MILLIGRAM(S): at 13:18

## 2017-10-12 RX ADMIN — ATORVASTATIN CALCIUM 40 MILLIGRAM(S): 80 TABLET, FILM COATED ORAL at 21:40

## 2017-10-12 RX ADMIN — Medication 5 MILLIGRAM(S): at 06:22

## 2017-10-12 RX ADMIN — WARFARIN SODIUM 3 MILLIGRAM(S): 2.5 TABLET ORAL at 21:40

## 2017-10-12 RX ADMIN — PANTOPRAZOLE SODIUM 40 MILLIGRAM(S): 20 TABLET, DELAYED RELEASE ORAL at 11:57

## 2017-10-12 RX ADMIN — CLOPIDOGREL BISULFATE 75 MILLIGRAM(S): 75 TABLET, FILM COATED ORAL at 11:57

## 2017-10-12 RX ADMIN — TAMSULOSIN HYDROCHLORIDE 0.4 MILLIGRAM(S): 0.4 CAPSULE ORAL at 21:40

## 2017-10-12 NOTE — PROGRESS NOTE ADULT - SUBJECTIVE AND OBJECTIVE BOX
Interval History: No acute issues overnight. Based on IM note - pt's family declining PPM.      Medications:  amiodarone    Tablet 200 milliGRAM(s) Oral daily  aspirin  chewable 81 milliGRAM(s) Oral daily  atorvastatin 40 milliGRAM(s) Oral at bedtime  busPIRone 5 milliGRAM(s) Oral two times a day  calcium acetate 667 milliGRAM(s) Oral three times a day with meals  clopidogrel Tablet 75 milliGRAM(s) Oral daily  dextrose 5%. 1000 milliLiter(s) IV Continuous <Continuous>  dextrose 50% Injectable 12.5 Gram(s) IV Push once  dextrose 50% Injectable 25 Gram(s) IV Push once  dextrose 50% Injectable 25 Gram(s) IV Push once  dextrose Gel 1 Dose(s) Oral once PRN  epoetin georgie Injectable 08313 Unit(s) IV Push every other day  glucagon  Injectable 1 milliGRAM(s) IntraMuscular once PRN  insulin lispro (HumaLOG) corrective regimen sliding scale   SubCutaneous three times a day before meals  insulin lispro (HumaLOG) corrective regimen sliding scale   SubCutaneous at bedtime  metoprolol 25 milliGRAM(s) Oral two times a day  Nephro-sophie 1 Tablet(s) Oral daily  pantoprazole  Injectable 40 milliGRAM(s) IV Push daily  tamsulosin 0.4 milliGRAM(s) Oral at bedtime  warfarin 3 milliGRAM(s) Oral once    PMH/PSH/FH/SH: Unchanged    Review Of Systems:  Constitutional: [ ] Fever [ ] Chills [ ] Fatigue [ ] Weight change   HEENT: [ ] Blurred vision [ ] Eye Pain [ ] Headache [ ] Runny nose [ ] Sore Throat   Respiratory: [ ] Cough [ ] Wheezing [ ] Shortness of breath  Cardiovascular: [ ] Chest Pain [ ] Palpitations [ ] CHAMPION [ ] PND [ ] Orthopnea  Gastrointestinal: [ ] Abdominal Pain [ ] Diarrhea [ ] Constipation [ ] Hemorrhoids [ ] Nausea [ ] Vomiting  Genitourinary: [ ] Nocturia [ ] Dysuria [ ] Incontinence  Extremities: [ ] Swelling [ ] Joint Pain  Neurologic: [ ] Focal deficit [ ] Paresthesias [ ] Syncope  Lymphatic: [ ] Swelling [ ] Lymphadenopathy   Skin: [ ] Rash [ ] Ecchymoses [ ] Wounds [ ] Lesions  Psychiatry: [ ] Depression [ ] Suicidal/Homicidal Ideation [ ] Anxiety [ ] Sleep Disturbances  [ ] 10 point review of systems is otherwise negative except as mentioned above             Vitals:  T(C): 36.3 (10-12-17 @ 12:43), Max: 36.7 (10-12-17 @ 05:06)  HR: 74 (10-12-17 @ 12:43) (74 - 80)  BP: 121/74 (10-12-17 @ 12:43) (111/72 - 121/74)  RR: 17 (10-12-17 @ 12:43) (17 - 18)  SpO2: 99% (10-12-17 @ 12:43) (97% - 99%)    Physical Exam:  · CONSTITUTIONAL:  Nonverbal.  Lies in bed.  Limited response to commands.  Lethargic. Restricted facial expression  · NECK:	No bruits; no JVD  · RESPIRATORY:  Minimal breath sounds.  CTA bilaterally.  No rhonchi, wheezes, rales appreciated   · CARDIOVASCULAR:   PMI not displaced.  Regular rate and rhythm.  S1/S2 normal.  No rub, murmur or gallop.  · EXTREMITIES: No cyanosis, clubbing or edema  · VASCULAR: 	Equal and normal pulses (carotid, femoral, dorsalis pedis)    ABD:  Soft, NT.  + BS  · NEUROLOGICAL:  Patient non-verbal. Opens eyes after shaking his hand and repeatedly saying his name.  Does not respond to command with .      EXT:  No edema   SKIN:  No rash    Interpretation of Telemetry:  NSR 60-80s    I&O's Summary    11 Oct 2017 07:01  -  12 Oct 2017 07:00  --------------------------------------------------------  IN: 660 mL / OUT: 500 mL / NET: 160 mL    12 Oct 2017 07:01  -  12 Oct 2017 15:05  --------------------------------------------------------  IN: 480 mL / OUT: 0 mL / NET: 480 mL    LABS:    Complete Blood Count + Automated Diff (10.12.17 @ 07:02)    WBC Count: 6.6 K/uL    RBC Count: 3.26 M/uL    Hemoglobin: 9.7 g/dL    Hematocrit: 30.2 %    Mean Cell Volume: 92.5 fl    Mean Cell Hemoglobin: 29.6 pg    Mean Cell Hemoglobin Conc: 32.0 gm/dL    Red Cell Distrib Width: 14.3 %    Platelet Count - Automated: 338 K/uL    Auto Neutrophil #: 4.2 K/uL    Auto Lymphocyte #: 1.5 K/uL    Auto Monocyte #: 0.6 K/uL    Auto Eosinophil #: 0.2 K/uL    Auto Basophil #: 0.1 K/uL    Auto Neutrophil %: 64.9: Differential percentages must be correlated with absolute numbers for  clinical significance. %    Auto Lymphocyte %: 22.8 %    Auto Monocyte %: 8.5 %    Auto Eosinophil %: 2.7 %    Auto Basophil %: 1.2 %    10-12    140  |  98  |  59<H>  ----------------------------<  116<H>  4.4   |  22  |  5.41<H>    Ca    9.0      12 Oct 2017 07:02  Phos  5.0     10-12  Mg     2.2     10-12    PT/INR - ( 12 Oct 2017 07:02 )   PT: 16.5 sec;   INR: 1.51 ratio

## 2017-10-12 NOTE — PROGRESS NOTE ADULT - ASSESSMENT
71 yo M w/PMH of extensive CAD, T2DM, CKD, prior CVA transferred from OSH for NSTEMI s/p Impella-assisted LHC w/BABAR x 5 and obstructive nephropathy requiring HD transferred from CCU after PEA/Vtach arrest with ROSC c/b new embolic CVA. S/p permacath placement by IR on 10/9, family does not wish to PPM at this time, pending placement to acute rehab

## 2017-10-12 NOTE — PROGRESS NOTE ADULT - ASSESSMENT
71 yo M w/PMH of extensive CAD, prior CVA, T2DM, ESRD HD via permacath.   Transferred from OSH for N-STEMI and pyelonephritis, with course c/b obstructive nephropathy  S/P impella assisted LHC with DESx5.  Suffered cardiac arrest, V. Tach, w/ hospital course further c/b embolic CVA.  Now has right hemiplegia, and is non-verbal.         # NSTEMI s/p Impella-assisted Cath with BABAR x5 to LAD/RCA; complicated by subsequent VT arrest  - c/w DAPT, statin, and beta-blocker    # ICM (EF~20%)- NYHA 2-3, ACC/AHA C-currently euvolemic  - Renal failure on HD.  Nephrology advises against ACE or ARB at this time.   - c/w metoprolol at 25mg bid  - Family declining  PPM.     # Paroxysmal atrial fibrillation/flutter -currently in NSR  - cont PO amio 200mg QD  - would resume warfarin if no plans for PPM    # CVA (cerebral vascular accident) w/ CTH (9/23) demonstrating multifocal infarcts concerning for embolic strokes  - c/w coumadin  - c/w atorvastatin  - PT/OT following       RENATE Villalobos M.D.  [id number]    Patient seen and examined; discussed with cardiology fellow, Dr. Villalobos. Hx., exam and labs as above.  I agree with the assessment and recommendations.   Erick Roy M.D.  Cardiology Consult Service  [phone] or [phone]

## 2017-10-12 NOTE — PROGRESS NOTE ADULT - PROBLEM SELECTOR PLAN 2
patient with CTH (9/23) demonstrating multifocal infarcts concerning for embolic strokes  - c/w aspirin, plavix, atorvastatin  - PT/OT following  - consider speech eval  - will d/w neuro for MRI, unlikely to change treatment  - pending placement to acute rehab

## 2017-10-12 NOTE — PROGRESS NOTE ADULT - SUBJECTIVE AND OBJECTIVE BOX
Resting, non-verbal    Vital Signs Last 24 Hrs  T(C): 36.3 (10-12-17 @ 12:43), Max: 36.7 (10-12-17 @ 05:06)  T(F): 97.4 (10-12-17 @ 12:43), Max: 98.1 (10-12-17 @ 05:06)  HR: 74 (10-12-17 @ 12:43) (74 - 80)  BP: 121/74 (10-12-17 @ 12:43) (111/72 - 121/74)  BP(mean): --  RR: 17 (10-12-17 @ 12:43) (17 - 18)  SpO2: 99% (10-12-17 @ 12:43) (97% - 99%)             amiodarone    Tablet 200 milliGRAM(s) Oral daily  aspirin  chewable 81 milliGRAM(s) Oral daily  atorvastatin 40 milliGRAM(s) Oral at bedtime  busPIRone 5 milliGRAM(s) Oral two times a day  calcium acetate 667 milliGRAM(s) Oral three times a day with meals  clopidogrel Tablet 75 milliGRAM(s) Oral daily  dextrose 5%. 1000 milliLiter(s) IV Continuous <Continuous>  dextrose 50% Injectable 12.5 Gram(s) IV Push once  dextrose 50% Injectable 25 Gram(s) IV Push once  dextrose 50% Injectable 25 Gram(s) IV Push once  dextrose Gel 1 Dose(s) Oral once PRN  epoetin georgie Injectable 82271 Unit(s) IV Push every other day  glucagon  Injectable 1 milliGRAM(s) IntraMuscular once PRN  insulin lispro (HumaLOG) corrective regimen sliding scale   SubCutaneous three times a day before meals  insulin lispro (HumaLOG) corrective regimen sliding scale   SubCutaneous at bedtime  metoprolol 25 milliGRAM(s) Oral two times a day  Nephro-sophie 1 Tablet(s) Oral daily  pantoprazole  Injectable 40 milliGRAM(s) IV Push daily  tamsulosin 0.4 milliGRAM(s) Oral at bedtime  warfarin 3 milliGRAM(s) Oral once    Respiratory: good air entry  Cardiovascular: S1 S2   Gastrointestinal: soft, ND, BS present  Extremities:  no edema                          9.7    6.6   )-----------( 338      ( 12 Oct 2017 07:02 )             30.2     12 Oct 2017 07:02    140    |  98     |  59     ----------------------------<  116    4.4     |  22     |  5.41     Ca    9.0        12 Oct 2017 07:02  Phos  5.0       12 Oct 2017 07:02  Mg     2.2       12 Oct 2017 07:02      Assessment and Plan:   		  CM, NSTEMI, s/p cath, PCI  S/p arrest, intubation  PAfib, s/p CVA  S/p LIN on CKD in setting of non-fx L kidney and R hydro due to stone plus hemodynamic/dye injury  Started on HD in [facility]  Subsequently passed stone and hydro resolved, but no renal fx recovery so far  On HD MWF via perm cath  Procrit w/HD  Renal diet  While most likely pt will require long term dialysis, would avoid ACE/ARB or other nephrotoxins for now  D/c planning to rehab when ready

## 2017-10-12 NOTE — CHART NOTE - NSCHARTNOTEFT_GEN_A_CORE
Source: Patient [ ]    Family [ ]     other [x ]PCA, Nurse, chart    Diet : CSTCHOSN/DYS1HC/RENAL  1 x Nepro daily-Nursing states he may benefit from 2 x Nepro daily.    PMH of extensive CAD, T2DM, CKD, prior CVA transferred from OSH for NSTEMI s/p Impella-assisted LHC w/BABAR x 5 and obstructive nephropathy requiring HD transferred from CCU after PEA/Vtach arrest with ROSC c/b new embolic CVA. S/p permacath placement by IR on 10/9, family does not wish to PPM at this time, pending placement to acute rehab    Patient reports [ ] nausea  [ ] vomiting [ ] diarrhea [ ] constipation  [ ]chewing problems [ ] swallowing issues  [x ] other: non-verbal     PO intake:  < 50% [ ] 50-75% [ ]   % [ x]  other :     Source for PO intake [ ] Patient [ ] family [ ] chart [ x] staff [ ] other     Enteral /Parenteral Nutrition:       Current Weight: 157.1pounds/71.3kg  % Weight Change: 5% loss since previous assess on 10/5    Pertinent Medications: MEDICATIONS  (STANDING):  amiodarone    Tablet 200 milliGRAM(s) Oral daily  aspirin  chewable 81 milliGRAM(s) Oral daily  atorvastatin 40 milliGRAM(s) Oral at bedtime  busPIRone 5 milliGRAM(s) Oral two times a day  calcium acetate 667 milliGRAM(s) Oral three times a day with meals  clopidogrel Tablet 75 milliGRAM(s) Oral daily  dextrose 5%. 1000 milliLiter(s) (50 mL/Hr) IV Continuous <Continuous>  dextrose 50% Injectable 12.5 Gram(s) IV Push once  dextrose 50% Injectable 25 Gram(s) IV Push once  dextrose 50% Injectable 25 Gram(s) IV Push once  epoetin georgie Injectable 56447 Unit(s) IV Push every other day  insulin lispro (HumaLOG) corrective regimen sliding scale   SubCutaneous three times a day before meals  insulin lispro (HumaLOG) corrective regimen sliding scale   SubCutaneous at bedtime  metoprolol 25 milliGRAM(s) Oral two times a day  Nephro-sophie 1 Tablet(s) Oral daily  pantoprazole  Injectable 40 milliGRAM(s) IV Push daily  tamsulosin 0.4 milliGRAM(s) Oral at bedtime  warfarin 3 milliGRAM(s) Oral once    MEDICATIONS  (PRN):  dextrose Gel 1 Dose(s) Oral once PRN Blood Glucose LESS THAN 70 milliGRAM(s)/deciLiter  glucagon  Injectable 1 milliGRAM(s) IntraMuscular once PRN Glucose <70 milliGRAM(s)/deciLiter    Pertinent Labs:  10-12 Na140 mmol/L Glu 116 mg/dL<H> K+ 4.4 mmol/L Cr  5.41 mg/dL<H> BUN 59 mg/dL<H> Phos 5.0 mg/dL<H>          POCT Blood Glucose.: 178 mg/dL (12 Oct 2017 12:31)  POCT Blood Glucose.: 142 mg/dL (12 Oct 2017 08:27)  POCT Blood Glucose.: 200 mg/dL (11 Oct 2017 21:06)  POCT Blood Glucose.: 169 mg/dL (11 Oct 2017 17:40)  POCT Blood Glucose.: 112 mg/dL (11 Oct 2017 12:53)    Hemoglobin A1C, Whole Blood: 6.5 % (09-13 @ 10:32)  Hemoglobin A1C, Whole Blood: 6.5 % (09-07 @ 09:57)          Skin: no edema, questionable pressure ulcer on sacrum     Estimated Needs:   [ x] no change since previous assessment  [ ] recalculated:       Previous Nutrition Diagnosis:     [ x] Inadequate Protein-Energy Intake [ ]Inadequate Oral Intake [ ] Excessive Energy Intake     [ ] Underweight [ ] Increased Nutrient Needs [ ] Overweight/Obesity     [ ] Altered GI Function [ ] Unintended Weight Loss [ ] Food & Nutrition Related Knowledge Deficit [ ] Malnutrition          Nutrition Diagnosis is [x ] ongoing  [ ] resolved [ ] not applicable -being addressed with supplements         New Nutrition Diagnosis: [x ] not applicable    [ ] Inadequate Protein Energy Intake [ ]Inadequate Oral Intake [ ] Excessive Energy Intake     [ ] Underweight [ ] Increased Nutrient Needs [ ] Overweight/Obesity     [ ] Altered GI Function [ ] Unintended Weight Loss [ ] Food & Nutrition Related Knowledge Deficit[ ] Limited Adherence to nutrition related recommendations [ ] Malnutrition  [ ] other: Free text       Related to:      As evidenced by:      Interventions:     Recommend    [ ] Change Diet To:    [x ] Nutrition Supplement-increase Nepro to 2 x daily    [ ] Nutrition Support    [ ] Other:        Monitoring and Evaluation:     [x ] PO intake [x ] Tolerance to diet prescription [ x] weights [ x] follow up per protocol    [ ] other:

## 2017-10-12 NOTE — PROGRESS NOTE ADULT - ATTENDING COMMENTS
Agree with R1/R2 resident plan of care  Unchanged mental status- AA, not expressive, afebrile, No distress  - Cardiology f/u plan noted. No PPM as family refused. c/w current meds  - AC bridge with IV heparin gtt and coumadin in progress  - HD per renal  - DNR

## 2017-10-12 NOTE — PROGRESS NOTE ADULT - SUBJECTIVE AND OBJECTIVE BOX
Patient is a 72y old  Male who presents with a chief complaint of s/p cardiac cath (22 Sep 2017 12:34)        SUBJECTIVE / OVERNIGHT EVENTS: Pt remains nonverbal.       MEDICATIONS  (STANDING):  amiodarone    Tablet 200 milliGRAM(s) Oral daily  aspirin  chewable 81 milliGRAM(s) Oral daily  atorvastatin 40 milliGRAM(s) Oral at bedtime  busPIRone 5 milliGRAM(s) Oral two times a day  calcium acetate 667 milliGRAM(s) Oral three times a day with meals  clopidogrel Tablet 75 milliGRAM(s) Oral daily  dextrose 5%. 1000 milliLiter(s) (50 mL/Hr) IV Continuous <Continuous>  dextrose 50% Injectable 12.5 Gram(s) IV Push once  dextrose 50% Injectable 25 Gram(s) IV Push once  dextrose 50% Injectable 25 Gram(s) IV Push once  epoetin georgie Injectable 07407 Unit(s) IV Push every other day  insulin lispro (HumaLOG) corrective regimen sliding scale   SubCutaneous three times a day before meals  insulin lispro (HumaLOG) corrective regimen sliding scale   SubCutaneous at bedtime  metoprolol 25 milliGRAM(s) Oral two times a day  Nephro-sophie 1 Tablet(s) Oral daily  pantoprazole  Injectable 40 milliGRAM(s) IV Push daily  tamsulosin 0.4 milliGRAM(s) Oral at bedtime  warfarin 3 milliGRAM(s) Oral once    MEDICATIONS  (PRN):  dextrose Gel 1 Dose(s) Oral once PRN Blood Glucose LESS THAN 70 milliGRAM(s)/deciLiter  glucagon  Injectable 1 milliGRAM(s) IntraMuscular once PRN Glucose <70 milliGRAM(s)/deciLiter      T(C): 36.7 (10-12-17 @ 05:06), Max: 37.1 (10-11-17 @ 12:35)  HR: 76 (10-12-17 @ 05:06) (76 - 99)  BP: 111/72 (10-12-17 @ 05:06) (111/72 - 121/73)  RR: 18 (10-12-17 @ 05:06) (16 - 18)  SpO2: 98% (10-12-17 @ 05:06) (97% - 98%)  CAPILLARY BLOOD GLUCOSE      POCT Blood Glucose.: 142 mg/dL (12 Oct 2017 08:27)  POCT Blood Glucose.: 200 mg/dL (11 Oct 2017 21:06)  POCT Blood Glucose.: 169 mg/dL (11 Oct 2017 17:40)  POCT Blood Glucose.: 112 mg/dL (11 Oct 2017 12:53)    I&O's Summary    11 Oct 2017 07:01  -  12 Oct 2017 07:00  --------------------------------------------------------  IN: 660 mL / OUT: 500 mL / NET: 160 mL        PHYSICAL EXAM  GENERAL: NAD, well-developed  HEAD:  Atraumatic, Normocephalic  EYES: EOMI, PERRLA, conjunctiva and sclera clear  NECK: Supple, No JVD  CHEST/LUNG: Clear to auscultation bilaterally; No wheeze  HEART: Regular rate and rhythm; No murmurs, rubs, or gallops  ABDOMEN: Soft, Nontender, Nondistended; Bowel sounds present  EXTREMITIES:  2+ Peripheral Pulses, No clubbing, cyanosis, or edema  PSYCH: AAOx3  SKIN: No rashes or lesions    LABS:                        9.7    6.6   )-----------( 338      ( 12 Oct 2017 07:02 )             30.2     10-12    140  |  98  |  59<H>  ----------------------------<  116<H>  4.4   |  22  |  5.41<H>    Ca    9.0      12 Oct 2017 07:02  Phos  5.0     10-12  Mg     2.2     10-12      PT/INR - ( 12 Oct 2017 07:02 )   PT: 16.5 sec;   INR: 1.51 ratio                   RADIOLOGY & ADDITIONAL TESTS:    Imaging Personally Reviewed:  Consultant(s) Notes Reviewed:    Care Discussed with Consultants/Other Providers: Patient is a 72y old  Male who presents with a chief complaint of s/p cardiac cath (22 Sep 2017 12:34)        SUBJECTIVE / OVERNIGHT EVENTS: [place]  services used. Pt remains nonverbal.       MEDICATIONS  (STANDING):  amiodarone    Tablet 200 milliGRAM(s) Oral daily  aspirin  chewable 81 milliGRAM(s) Oral daily  atorvastatin 40 milliGRAM(s) Oral at bedtime  busPIRone 5 milliGRAM(s) Oral two times a day  calcium acetate 667 milliGRAM(s) Oral three times a day with meals  clopidogrel Tablet 75 milliGRAM(s) Oral daily  dextrose 5%. 1000 milliLiter(s) (50 mL/Hr) IV Continuous <Continuous>  dextrose 50% Injectable 12.5 Gram(s) IV Push once  dextrose 50% Injectable 25 Gram(s) IV Push once  dextrose 50% Injectable 25 Gram(s) IV Push once  epoetin georgie Injectable 83883 Unit(s) IV Push every other day  insulin lispro (HumaLOG) corrective regimen sliding scale   SubCutaneous three times a day before meals  insulin lispro (HumaLOG) corrective regimen sliding scale   SubCutaneous at bedtime  metoprolol 25 milliGRAM(s) Oral two times a day  Nephro-sophie 1 Tablet(s) Oral daily  pantoprazole  Injectable 40 milliGRAM(s) IV Push daily  tamsulosin 0.4 milliGRAM(s) Oral at bedtime  warfarin 3 milliGRAM(s) Oral once    MEDICATIONS  (PRN):  dextrose Gel 1 Dose(s) Oral once PRN Blood Glucose LESS THAN 70 milliGRAM(s)/deciLiter  glucagon  Injectable 1 milliGRAM(s) IntraMuscular once PRN Glucose <70 milliGRAM(s)/deciLiter      T(C): 36.7 (10-12-17 @ 05:06), Max: 37.1 (10-11-17 @ 12:35)  HR: 76 (10-12-17 @ 05:06) (76 - 99)  BP: 111/72 (10-12-17 @ 05:06) (111/72 - 121/73)  RR: 18 (10-12-17 @ 05:06) (16 - 18)  SpO2: 98% (10-12-17 @ 05:06) (97% - 98%)  CAPILLARY BLOOD GLUCOSE      POCT Blood Glucose.: 142 mg/dL (12 Oct 2017 08:27)  POCT Blood Glucose.: 200 mg/dL (11 Oct 2017 21:06)  POCT Blood Glucose.: 169 mg/dL (11 Oct 2017 17:40)  POCT Blood Glucose.: 112 mg/dL (11 Oct 2017 12:53)    I&O's Summary    11 Oct 2017 07:01  -  12 Oct 2017 07:00  --------------------------------------------------------  IN: 660 mL / OUT: 500 mL / NET: 160 mL        PHYSICAL EXAM  GENERAL: NAD, well-developed  HEAD:  Atraumatic, Normocephalic  EYES: EOMI, PERRLA, conjunctiva and sclera clear  NECK: Supple, No JVD  CHEST/LUNG: Clear to auscultation bilaterally; No wheeze  HEART: Regular rate and rhythm; No murmurs, rubs, or gallops  ABDOMEN: Soft, Nontender, Nondistended; Bowel sounds present  EXTREMITIES:  2+ Peripheral Pulses, No clubbing, cyanosis, or edema  PSYCH: AAOx3  SKIN: No rashes or lesions    LABS:                        9.7    6.6   )-----------( 338      ( 12 Oct 2017 07:02 )             30.2     10-12    140  |  98  |  59<H>  ----------------------------<  116<H>  4.4   |  22  |  5.41<H>    Ca    9.0      12 Oct 2017 07:02  Phos  5.0     10-12  Mg     2.2     10-12      PT/INR - ( 12 Oct 2017 07:02 )   PT: 16.5 sec;   INR: 1.51 ratio                   RADIOLOGY & ADDITIONAL TESTS:    Imaging Personally Reviewed:  Consultant(s) Notes Reviewed:    Care Discussed with Consultants/Other Providers:

## 2017-10-13 LAB
ANION GAP SERPL CALC-SCNC: 22 MMOL/L — HIGH (ref 5–17)
BASOPHILS # BLD AUTO: 0 K/UL — SIGNIFICANT CHANGE UP (ref 0–0.2)
BASOPHILS NFR BLD AUTO: 0.2 % — SIGNIFICANT CHANGE UP (ref 0–2)
BUN SERPL-MCNC: 89 MG/DL — HIGH (ref 7–23)
CALCIUM SERPL-MCNC: 9 MG/DL — SIGNIFICANT CHANGE UP (ref 8.4–10.5)
CHLORIDE SERPL-SCNC: 98 MMOL/L — SIGNIFICANT CHANGE UP (ref 96–108)
CO2 SERPL-SCNC: 19 MMOL/L — LOW (ref 22–31)
CREAT SERPL-MCNC: 7.53 MG/DL — HIGH (ref 0.5–1.3)
EOSINOPHIL # BLD AUTO: 0.2 K/UL — SIGNIFICANT CHANGE UP (ref 0–0.5)
EOSINOPHIL NFR BLD AUTO: 2.9 % — SIGNIFICANT CHANGE UP (ref 0–6)
GLUCOSE BLDC GLUCOMTR-MCNC: 117 MG/DL — HIGH (ref 70–99)
GLUCOSE BLDC GLUCOMTR-MCNC: 137 MG/DL — HIGH (ref 70–99)
GLUCOSE BLDC GLUCOMTR-MCNC: 238 MG/DL — HIGH (ref 70–99)
GLUCOSE BLDC GLUCOMTR-MCNC: 247 MG/DL — HIGH (ref 70–99)
GLUCOSE SERPL-MCNC: 119 MG/DL — HIGH (ref 70–99)
HCT VFR BLD CALC: 31 % — LOW (ref 39–50)
HGB BLD-MCNC: 10.5 G/DL — LOW (ref 13–17)
INR BLD: 1.57 RATIO — HIGH (ref 0.88–1.16)
LYMPHOCYTES # BLD AUTO: 1.8 K/UL — SIGNIFICANT CHANGE UP (ref 1–3.3)
LYMPHOCYTES # BLD AUTO: 27.5 % — SIGNIFICANT CHANGE UP (ref 13–44)
MAGNESIUM SERPL-MCNC: 2.5 MG/DL — SIGNIFICANT CHANGE UP (ref 1.6–2.6)
MCHC RBC-ENTMCNC: 31.1 PG — SIGNIFICANT CHANGE UP (ref 27–34)
MCHC RBC-ENTMCNC: 33.8 GM/DL — SIGNIFICANT CHANGE UP (ref 32–36)
MCV RBC AUTO: 92.1 FL — SIGNIFICANT CHANGE UP (ref 80–100)
MONOCYTES # BLD AUTO: 0.5 K/UL — SIGNIFICANT CHANGE UP (ref 0–0.9)
MONOCYTES NFR BLD AUTO: 7.7 % — SIGNIFICANT CHANGE UP (ref 2–14)
NEUTROPHILS # BLD AUTO: 4 K/UL — SIGNIFICANT CHANGE UP (ref 1.8–7.4)
NEUTROPHILS NFR BLD AUTO: 61.7 % — SIGNIFICANT CHANGE UP (ref 43–77)
PHOSPHATE SERPL-MCNC: 5.6 MG/DL — HIGH (ref 2.5–4.5)
PLATELET # BLD AUTO: 314 K/UL — SIGNIFICANT CHANGE UP (ref 150–400)
POTASSIUM SERPL-MCNC: 4.7 MMOL/L — SIGNIFICANT CHANGE UP (ref 3.5–5.3)
POTASSIUM SERPL-SCNC: 4.7 MMOL/L — SIGNIFICANT CHANGE UP (ref 3.5–5.3)
PROTHROM AB SERPL-ACNC: 17.1 SEC — HIGH (ref 9.8–12.7)
RBC # BLD: 3.36 M/UL — LOW (ref 4.2–5.8)
RBC # FLD: 14.6 % — HIGH (ref 10.3–14.5)
SODIUM SERPL-SCNC: 139 MMOL/L — SIGNIFICANT CHANGE UP (ref 135–145)
WBC # BLD: 6.5 K/UL — SIGNIFICANT CHANGE UP (ref 3.8–10.5)
WBC # FLD AUTO: 6.5 K/UL — SIGNIFICANT CHANGE UP (ref 3.8–10.5)

## 2017-10-13 PROCEDURE — 99232 SBSQ HOSP IP/OBS MODERATE 35: CPT

## 2017-10-13 RX ORDER — WARFARIN SODIUM 2.5 MG/1
3 TABLET ORAL ONCE
Qty: 0 | Refills: 0 | Status: COMPLETED | OUTPATIENT
Start: 2017-10-13 | End: 2017-10-13

## 2017-10-13 RX ADMIN — Medication 1 TABLET(S): at 13:24

## 2017-10-13 RX ADMIN — WARFARIN SODIUM 3 MILLIGRAM(S): 2.5 TABLET ORAL at 21:24

## 2017-10-13 RX ADMIN — ATORVASTATIN CALCIUM 40 MILLIGRAM(S): 80 TABLET, FILM COATED ORAL at 21:24

## 2017-10-13 RX ADMIN — TAMSULOSIN HYDROCHLORIDE 0.4 MILLIGRAM(S): 0.4 CAPSULE ORAL at 21:24

## 2017-10-13 RX ADMIN — Medication 5 MILLIGRAM(S): at 18:14

## 2017-10-13 RX ADMIN — Medication 667 MILLIGRAM(S): at 18:14

## 2017-10-13 RX ADMIN — Medication 667 MILLIGRAM(S): at 08:04

## 2017-10-13 RX ADMIN — Medication 81 MILLIGRAM(S): at 13:24

## 2017-10-13 RX ADMIN — Medication 667 MILLIGRAM(S): at 13:33

## 2017-10-13 RX ADMIN — Medication 5 MILLIGRAM(S): at 06:06

## 2017-10-13 RX ADMIN — Medication 2: at 18:14

## 2017-10-13 RX ADMIN — AMIODARONE HYDROCHLORIDE 200 MILLIGRAM(S): 400 TABLET ORAL at 06:06

## 2017-10-13 RX ADMIN — PANTOPRAZOLE SODIUM 40 MILLIGRAM(S): 20 TABLET, DELAYED RELEASE ORAL at 13:24

## 2017-10-13 RX ADMIN — Medication 25 MILLIGRAM(S): at 18:14

## 2017-10-13 RX ADMIN — Medication 25 MILLIGRAM(S): at 06:06

## 2017-10-13 RX ADMIN — ERYTHROPOIETIN 10000 UNIT(S): 10000 INJECTION, SOLUTION INTRAVENOUS; SUBCUTANEOUS at 10:01

## 2017-10-13 RX ADMIN — CLOPIDOGREL BISULFATE 75 MILLIGRAM(S): 75 TABLET, FILM COATED ORAL at 13:24

## 2017-10-13 NOTE — PROGRESS NOTE ADULT - SUBJECTIVE AND OBJECTIVE BOX
Patient is a 72y old  Male who presents with a chief complaint of s/p cardiac cath (22 Sep 2017 12:34)        SUBJECTIVE / OVERNIGHT EVENTS: Pt enroute to HD.       MEDICATIONS  (STANDING):  amiodarone    Tablet 200 milliGRAM(s) Oral daily  aspirin  chewable 81 milliGRAM(s) Oral daily  atorvastatin 40 milliGRAM(s) Oral at bedtime  busPIRone 5 milliGRAM(s) Oral two times a day  calcium acetate 667 milliGRAM(s) Oral three times a day with meals  clopidogrel Tablet 75 milliGRAM(s) Oral daily  dextrose 5%. 1000 milliLiter(s) (50 mL/Hr) IV Continuous <Continuous>  dextrose 50% Injectable 12.5 Gram(s) IV Push once  dextrose 50% Injectable 25 Gram(s) IV Push once  dextrose 50% Injectable 25 Gram(s) IV Push once  epoetin georgie Injectable 82534 Unit(s) IV Push every other day  insulin lispro (HumaLOG) corrective regimen sliding scale   SubCutaneous three times a day before meals  insulin lispro (HumaLOG) corrective regimen sliding scale   SubCutaneous at bedtime  metoprolol 25 milliGRAM(s) Oral two times a day  Nephro-sophie 1 Tablet(s) Oral daily  pantoprazole  Injectable 40 milliGRAM(s) IV Push daily  tamsulosin 0.4 milliGRAM(s) Oral at bedtime    MEDICATIONS  (PRN):  dextrose Gel 1 Dose(s) Oral once PRN Blood Glucose LESS THAN 70 milliGRAM(s)/deciLiter  glucagon  Injectable 1 milliGRAM(s) IntraMuscular once PRN Glucose <70 milliGRAM(s)/deciLiter      T(C): 36.7 (10-13-17 @ 09:00), Max: 36.9 (10-12-17 @ 20:15)  HR: 71 (10-13-17 @ 09:00) (71 - 78)  BP: 129/66 (10-13-17 @ 09:00) (121/74 - 148/83)  RR: 17 (10-13-17 @ 09:00) (17 - 18)  SpO2: 98% (10-13-17 @ 09:00) (98% - 99%)  CAPILLARY BLOOD GLUCOSE  137 (13 Oct 2017 07:50)      POCT Blood Glucose.: 137 mg/dL (13 Oct 2017 07:48)  POCT Blood Glucose.: 159 mg/dL (12 Oct 2017 21:32)  POCT Blood Glucose.: 148 mg/dL (12 Oct 2017 17:45)  POCT Blood Glucose.: 178 mg/dL (12 Oct 2017 12:31)    I&O's Summary    12 Oct 2017 07:01  -  13 Oct 2017 07:00  --------------------------------------------------------  IN: 760 mL / OUT: 0 mL / NET: 760 mL    13 Oct 2017 07:01  -  13 Oct 2017 09:42  --------------------------------------------------------  IN: 240 mL / OUT: 0 mL / NET: 240 mL        PHYSICAL EXAM  GENERAL: NAD, well-developed  HEAD:  Atraumatic, Normocephalic  EYES: EOMI, PERRLA, conjunctiva and sclera clear  NECK: Supple, No JVD  CHEST/LUNG: Clear to auscultation bilaterally; No wheeze  HEART: Regular rate and rhythm; No murmurs, rubs, or gallops  ABDOMEN: Soft, Nontender, Nondistended; Bowel sounds present  EXTREMITIES:  2+ Peripheral Pulses, No clubbing, cyanosis, or edema  NEURO: right hemiplegia  SKIN: R upper thorax permacath, sacral ulcer, ecchymosis throughout left side    LABS:                        10.5   6.5   )-----------( 314      ( 13 Oct 2017 06:32 )             31.0     10-13    139  |  98  |  89<H>  ----------------------------<  119<H>  4.7   |  19<L>  |  7.53<H>    Ca    9.0      13 Oct 2017 06:32  Phos  5.6     10-13  Mg     2.5     10-13      PT/INR - ( 12 Oct 2017 07:02 )   PT: 16.5 sec;   INR: 1.51 ratio                   RADIOLOGY & ADDITIONAL TESTS:    Imaging Personally Reviewed:  Consultant(s) Notes Reviewed:    Care Discussed with Consultants/Other Providers:

## 2017-10-13 NOTE — PROGRESS NOTE ADULT - SUBJECTIVE AND OBJECTIVE BOX
24H hour events:  No acute events    MEDICATIONS:  amiodarone    Tablet 200 milliGRAM(s) Oral daily  aspirin  chewable 81 milliGRAM(s) Oral daily  clopidogrel Tablet 75 milliGRAM(s) Oral daily  metoprolol 25 milliGRAM(s) Oral two times a day  tamsulosin 0.4 milliGRAM(s) Oral at bedtime  warfarin 3 milliGRAM(s) Oral once        busPIRone 5 milliGRAM(s) Oral two times a day    pantoprazole  Injectable 40 milliGRAM(s) IV Push daily    atorvastatin 40 milliGRAM(s) Oral at bedtime  dextrose 50% Injectable 12.5 Gram(s) IV Push once  dextrose 50% Injectable 25 Gram(s) IV Push once  dextrose 50% Injectable 25 Gram(s) IV Push once  dextrose Gel 1 Dose(s) Oral once PRN  glucagon  Injectable 1 milliGRAM(s) IntraMuscular once PRN  insulin lispro (HumaLOG) corrective regimen sliding scale   SubCutaneous three times a day before meals  insulin lispro (HumaLOG) corrective regimen sliding scale   SubCutaneous at bedtime    calcium acetate 667 milliGRAM(s) Oral three times a day with meals  dextrose 5%. 1000 milliLiter(s) IV Continuous <Continuous>  epoetin georgie Injectable 67181 Unit(s) IV Push every other day  Nephro-sophie 1 Tablet(s) Oral daily        PHYSICAL EXAM:  T(C): 36.7 (10-13-17 @ 09:00), Max: 36.9 (10-12-17 @ 20:15)  HR: 71 (10-13-17 @ 09:00) (71 - 78)  BP: 129/66 (10-13-17 @ 09:00) (121/74 - 148/83)  RR: 17 (10-13-17 @ 09:00) (17 - 18)  SpO2: 98% (10-13-17 @ 09:00) (98% - 99%)  Wt(kg): --  I&O's Summary    12 Oct 2017 07:01  -  13 Oct 2017 07:00  --------------------------------------------------------  IN: 760 mL / OUT: 0 mL / NET: 760 mL    13 Oct 2017 07:01  -  13 Oct 2017 11:11  --------------------------------------------------------  IN: 240 mL / OUT: 0 mL / NET: 240 mL        Appearance: Normal	  HEENT:   Normal oral mucosa  Lymphatic: No lymphadenopathy  Cardiovascular: Normal S1 S2,         No JVD,      No murmurs,      No edema  Respiratory: Lungs clear to auscultation	  Psychiatry: Mood & affect appropriate  Gastrointestinal:  Soft, Non-tender	  Skin: No rashes, No ecchymoses, No cyanosis	  Neurologic: Non-focal, nonverbal  Extremities: Normal range of motion, No clubbing, cyanosis   Vascular: warm extremities        LABS:	 	    CBC Full  -  ( 13 Oct 2017 06:32 )  WBC Count : 6.5 K/uL  Hemoglobin : 10.5 g/dL  Hematocrit : 31.0 %  Platelet Count - Automated : 314 K/uL  Mean Cell Volume : 92.1 fl  Mean Cell Hemoglobin : 31.1 pg  Mean Cell Hemoglobin Concentration : 33.8 gm/dL  Auto Neutrophil # : 4.0 K/uL  Auto Lymphocyte # : 1.8 K/uL  Auto Monocyte # : 0.5 K/uL  Auto Eosinophil # : 0.2 K/uL  Auto Basophil # : 0.0 K/uL  Auto Neutrophil % : 61.7 %  Auto Lymphocyte % : 27.5 %  Auto Monocyte % : 7.7 %  Auto Eosinophil % : 2.9 %  Auto Basophil % : 0.2 %    10-13    139  |  98  |  89<H>  ----------------------------<  119<H>  4.7   |  19<L>  |  7.53<H>  10-12    140  |  98  |  59<H>  ----------------------------<  116<H>  4.4   |  22  |  5.41<H>    Ca    9.0      13 Oct 2017 06:32  Ca    9.0      12 Oct 2017 07:02  Phos  5.6     10-13  Phos  5.0     10-12  Mg     2.5     10-13  Mg     2.2     10-12        proBNP:     TELEMETRY: 	S 70

## 2017-10-13 NOTE — PROGRESS NOTE ADULT - SUBJECTIVE AND OBJECTIVE BOX
S/p stable HD    Vital Signs Last 24 Hrs  T(C): 36.8 (10-13-17 @ 12:28), Max: 36.9 (10-12-17 @ 20:15)  T(F): 98.3 (10-13-17 @ 12:28), Max: 98.5 (10-12-17 @ 20:15)  HR: 85 (10-13-17 @ 12:28) (71 - 85)  BP: 125/78 (10-13-17 @ 12:28) (124/69 - 148/83)  BP(mean): --  RR: 20 (10-13-17 @ 12:28) (17 - 20)  SpO2: 99% (10-13-17 @ 12:28) (98% - 99%)             amiodarone    Tablet 200 milliGRAM(s) Oral daily  aspirin  chewable 81 milliGRAM(s) Oral daily  atorvastatin 40 milliGRAM(s) Oral at bedtime  busPIRone 5 milliGRAM(s) Oral two times a day  calcium acetate 667 milliGRAM(s) Oral three times a day with meals  clopidogrel Tablet 75 milliGRAM(s) Oral daily  dextrose 5%. 1000 milliLiter(s) IV Continuous <Continuous>  dextrose 50% Injectable 12.5 Gram(s) IV Push once  dextrose 50% Injectable 25 Gram(s) IV Push once  dextrose 50% Injectable 25 Gram(s) IV Push once  dextrose Gel 1 Dose(s) Oral once PRN  epoetin georgie Injectable 04375 Unit(s) IV Push every other day  glucagon  Injectable 1 milliGRAM(s) IntraMuscular once PRN  insulin lispro (HumaLOG) corrective regimen sliding scale   SubCutaneous three times a day before meals  insulin lispro (HumaLOG) corrective regimen sliding scale   SubCutaneous at bedtime  metoprolol 25 milliGRAM(s) Oral two times a day  Nephro-sophie 1 Tablet(s) Oral daily  pantoprazole  Injectable 40 milliGRAM(s) IV Push daily  tamsulosin 0.4 milliGRAM(s) Oral at bedtime  warfarin 3 milliGRAM(s) Oral once      Respiratory: good air entry  Cardiovascular: S1 S2   Gastrointestinal: soft, ND, BS present  Extremities:  no edema                                 10.5   6.5   )-----------( 314      ( 13 Oct 2017 06:32 )             31.0     13 Oct 2017 06:32    139    |  98     |  89     ----------------------------<  119    4.7     |  19     |  7.53     Ca    9.0        13 Oct 2017 06:32  Phos  5.6       13 Oct 2017 06:32  Mg     2.5       13 Oct 2017 06:32        Assessment and Plan:   		  CM, NSTEMI, s/p cath, PCI  S/p arrest, intubation  PAfib, s/p CVA  S/p LIN on CKD in setting of non-fx L kidney and R hydro due to stone plus hemodynamic/dye injury  Started on HD in [facility]  Subsequently passed stone and hydro resolved, but no renal fx recovery so far  On HD MWF via perm cath  Procrit w/HD  Renal diet  While most likely pt will require long term dialysis, would avoid nephrotoxins for now  D/c planning to rehab when ready  PPM prior to d/c if HCP agrees

## 2017-10-13 NOTE — PROGRESS NOTE ADULT - SUBJECTIVE AND OBJECTIVE BOX
SUBJ: ([employer]  used).  Mr. Lara was seen today at bedside.  No acute events reported overnight.        MEDICATIONS  (STANDING):  amiodarone    Tablet 200 milliGRAM(s) Oral daily  aspirin  chewable 81 milliGRAM(s) Oral daily  atorvastatin 40 milliGRAM(s) Oral at bedtime  busPIRone 5 milliGRAM(s) Oral two times a day  calcium acetate 667 milliGRAM(s) Oral three times a day with meals  clopidogrel Tablet 75 milliGRAM(s) Oral daily  dextrose 5%. 1000 milliLiter(s) (50 mL/Hr) IV Continuous <Continuous>  dextrose 50% Injectable 12.5 Gram(s) IV Push once  dextrose 50% Injectable 25 Gram(s) IV Push once  dextrose 50% Injectable 25 Gram(s) IV Push once  epoetin georgie Injectable 39150 Unit(s) IV Push every other day  insulin lispro (HumaLOG) corrective regimen sliding scale   SubCutaneous three times a day before meals  insulin lispro (HumaLOG) corrective regimen sliding scale   SubCutaneous at bedtime  metoprolol 25 milliGRAM(s) Oral two times a day  Nephro-sophie 1 Tablet(s) Oral daily  pantoprazole  Injectable 40 milliGRAM(s) IV Push daily  tamsulosin 0.4 milliGRAM(s) Oral at bedtime  warfarin 3 milliGRAM(s) Oral once    MEDICATIONS  (PRN):  dextrose Gel 1 Dose(s) Oral once PRN Blood Glucose LESS THAN 70 milliGRAM(s)/deciLiter  glucagon  Injectable 1 milliGRAM(s) IntraMuscular once PRN Glucose <70 milliGRAM(s)/deciLiter            Vital Signs Last 24 Hrs  T(C): 36.7 (13 Oct 2017 09:00), Max: 36.9 (12 Oct 2017 20:15)  T(F): 98.1 (13 Oct 2017 09:00), Max: 98.5 (12 Oct 2017 20:15)  HR: 71 (13 Oct 2017 09:00) (71 - 78)  BP: 129/66 (13 Oct 2017 09:00) (121/74 - 148/83)  BP(mean): --  RR: 17 (13 Oct 2017 09:00) (17 - 18)  SpO2: 98% (13 Oct 2017 09:00) (98% - 99%)    REVIEW OF SYSTEMS:  CONSTITUTIONAL: No fever, weight loss, or fatigue  EYES: No eye pain, visual disturbances, or discharge  ENMT:  No difficulty hearing, tinnitus, vertigo; No sinus or throat pain  NECK: No pain or stiffness  RESPIRATORY: No cough, wheezing, chills or hemoptysis; No shortness of breath  CARDIOVASCULAR: No chest pain, palpitations, dizziness, or leg swelling  GASTROINTESTINAL: No abdominal or epigastric pain. No nausea, vomiting, or hematemesis; No diarrhea or constipation. No melena or hematochezia.  GENITOURINARY: No dysuria, frequency, hematuria, or incontinence  NEUROLOGICAL: No headaches, memory loss, loss of strength, numbness, or tremors  SKIN: No itching, burning, rashes, or lesions   LYMPH NODES: No enlarged glands  ENDOCRINE: No heat or cold intolerance; No hair loss  MUSCULOSKELETAL: No joint pain or swelling; No muscle, back, or extremity pain  PSYCHIATRIC: No depression, anxiety, mood swings, or difficulty sleeping  HEME/LYMPH: No easy bruising, or bleeding gums  ALLERY AND IMMUNOLOGIC: No hives or eczema    PHYSICAL EXAM:  · CONSTITUTIONAL:	Well-developed, well nourished    BMI-  · EYES:	EOMI; PERRL; no drainage or redness  · ENMT	No oral lesions; no gross abnormalities  · NECK:	No bruits; no thyromegaly or nodules  ·BACK:	No deformity or limitation of movement  ·RESPIRATORY:   airway patent; breath sounds equal; good air movement; respirations non-labored; clear to auscultation bilaterally; no chest wall tenderness; no intercostal retractions; no rales,rhonchi or wheeze  · CARDIOVASCULAR	regular rate and rhythm  no rub  no murmur  normal PMI  . GASTROINTESTINAL:  no distention; no masses palpable; bowel sounds normal; no rebound tenderness; no guarding; no rigidity; no organomegaly  · EXTREMITIES: No cyanosis, clubbing or edema  · VASCULAR: 	Equal and normal pulses (carotid, femoral, dorsalis pedis)  ·NEUROLOGICAL:   alert and oriented x 3; sensation intact; deep reflexes intact; cranial nerves intact; no spontaneous movement; superficial reflexes intact; normal strength  · SKIN:	No lesions; no rash  . LYMPH NODES:	No lymphadedenopathy  · MUSCULOSKELETAL:   No calf tenderness  no joint swelling	  TELEMETRY: NSR 60-80s    ECG:    TTE:    LABS:                        10.5   6.5   )-----------( 314      ( 13 Oct 2017 06:32 )             31.0     10-13    139  |  98  |  89<H>  ----------------------------<  119<H>  4.7   |  19<L>  |  7.53<H>    Ca    9.0      13 Oct 2017 06:32  Phos  5.6     10-13  Mg     2.5     10-13          PT/INR - ( 13 Oct 2017 09:59 )   PT: 17.1 sec;   INR: 1.57 ratio           Creatinine Trend: 7.53<--, 5.41<--, 7.46<--, 5.24<--, 10.11<--, 8.33<--  I&O's Summary    12 Oct 2017 07:01  -  13 Oct 2017 07:00  --------------------------------------------------------  IN: 760 mL / OUT: 0 mL / NET: 760 mL    13 Oct 2017 07:01  -  13 Oct 2017 10:47  --------------------------------------------------------  IN: 240 mL / OUT: 0 mL / NET: 240 mL      BNP  RADIOLOGY & ADDITIONAL STUDIES:    IMPRESSION AND PLAN: SUBJ: ([employer]  used).  Mr. Lara is nonverbal.  Mr. Lara was seen today at bedside.  No acute events reported overnight.  Patient did not respond to commands with the use of a Serbian .    Unable to conduct a ROS because patient is nonverbal.        MEDICATIONS  (STANDING):  amiodarone    Tablet 200 milliGRAM(s) Oral daily  aspirin  chewable 81 milliGRAM(s) Oral daily  atorvastatin 40 milliGRAM(s) Oral at bedtime  busPIRone 5 milliGRAM(s) Oral two times a day  calcium acetate 667 milliGRAM(s) Oral three times a day with meals  clopidogrel Tablet 75 milliGRAM(s) Oral daily  dextrose 5%. 1000 milliLiter(s) (50 mL/Hr) IV Continuous <Continuous>  dextrose 50% Injectable 12.5 Gram(s) IV Push once  dextrose 50% Injectable 25 Gram(s) IV Push once  dextrose 50% Injectable 25 Gram(s) IV Push once  epoetin georgie Injectable 98789 Unit(s) IV Push every other day  insulin lispro (HumaLOG) corrective regimen sliding scale   SubCutaneous three times a day before meals  insulin lispro (HumaLOG) corrective regimen sliding scale   SubCutaneous at bedtime  metoprolol 25 milliGRAM(s) Oral two times a day  Nephro-sophie 1 Tablet(s) Oral daily  pantoprazole  Injectable 40 milliGRAM(s) IV Push daily  tamsulosin 0.4 milliGRAM(s) Oral at bedtime  warfarin 3 milliGRAM(s) Oral once    MEDICATIONS  (PRN):  dextrose Gel 1 Dose(s) Oral once PRN Blood Glucose LESS THAN 70 milliGRAM(s)/deciLiter  glucagon  Injectable 1 milliGRAM(s) IntraMuscular once PRN Glucose <70 milliGRAM(s)/deciLiter            Vital Signs Last 24 Hrs  T(C): 36.7 (13 Oct 2017 09:00), Max: 36.9 (12 Oct 2017 20:15)  T(F): 98.1 (13 Oct 2017 09:00), Max: 98.5 (12 Oct 2017 20:15)  HR: 71 (13 Oct 2017 09:00) (71 - 78)  BP: 129/66 (13 Oct 2017 09:00) (121/74 - 148/83)  BP(mean): --  RR: 17 (13 Oct 2017 09:00) (17 - 18)  SpO2: 98% (13 Oct 2017 09:00) (98% - 99%)    PHYSICAL EXAM:  · CONSTITUTIONAL: Nonverbal.  Rt hemiplegia.  Non responsive to command.  Eating semi-solid food with the assistance of nurse  	  ·RESPIRATORY: Assessed only anterior lung fields.  Breath sounds equal.  CTA bilaterally of anterior lung fields.  Did not appreciate wheezes, rhonchi, rales of anterior lung fields.  · CARDIOVASCULAR: RRR.  No murmurs, rubs, gallops.  . GASTROINTESTINAL:  Bowels sounds present.  Could not assess tender on palpation because patient is nonresponsive.  No organmegaly appreciated.      TELEMETRY: NSR 60-80s    ECG:    TTE:    LABS:                        10.5   6.5   )-----------( 314      ( 13 Oct 2017 06:32 )             31.0     10-13    139  |  98  |  89<H>  ----------------------------<  119<H>  4.7   |  19<L>  |  7.53<H>    Ca    9.0      13 Oct 2017 06:32  Phos  5.6     10-13  Mg     2.5     10-13          PT/INR - ( 13 Oct 2017 09:59 )   PT: 17.1 sec;   INR: 1.57 ratio           Creatinine Trend: 7.53<--, 5.41<--, 7.46<--, 5.24<--, 10.11<--, 8.33<--  I&O's Summary    12 Oct 2017 07:01  -  13 Oct 2017 07:00  --------------------------------------------------------  IN: 760 mL / OUT: 0 mL / NET: 760 mL    13 Oct 2017 07:01  -  13 Oct 2017 10:47  --------------------------------------------------------  IN: 240 mL / OUT: 0 mL / NET: 240 mL      BNP  RADIOLOGY & ADDITIONAL STUDIES:    IMPRESSION AND PLAN: SUBJ: Mr. Lara is non-verbal.  No acute events reported overnight.  Patient did not respond to commands with the use of a Scottish translato ([employer]  used).        Unable to conduct a ROS because patient is nonverbal.      MEDICATIONS  (STANDING):  amiodarone    Tablet 200 milliGRAM(s) Oral daily  aspirin  chewable 81 milliGRAM(s) Oral daily  atorvastatin 40 milliGRAM(s) Oral at bedtime  busPIRone 5 milliGRAM(s) Oral two times a day  calcium acetate 667 milliGRAM(s) Oral three times a day with meals  clopidogrel Tablet 75 milliGRAM(s) Oral daily  dextrose 5%. 1000 milliLiter(s) (50 mL/Hr) IV Continuous <Continuous>  dextrose 50% Injectable 12.5 Gram(s) IV Push once  dextrose 50% Injectable 25 Gram(s) IV Push once  dextrose 50% Injectable 25 Gram(s) IV Push once  epoetin georgie Injectable 27599 Unit(s) IV Push every other day  insulin lispro (HumaLOG) corrective regimen sliding scale   SubCutaneous three times a day before meals  insulin lispro (HumaLOG) corrective regimen sliding scale   SubCutaneous at bedtime  metoprolol 25 milliGRAM(s) Oral two times a day  Nephro-sophie 1 Tablet(s) Oral daily  pantoprazole  Injectable 40 milliGRAM(s) IV Push daily  tamsulosin 0.4 milliGRAM(s) Oral at bedtime  warfarin 3 milliGRAM(s) Oral once    MEDICATIONS  (PRN):  dextrose Gel 1 Dose(s) Oral once PRN Blood Glucose LESS THAN 70 milliGRAM(s)/deciLiter  glucagon  Injectable 1 milliGRAM(s) IntraMuscular once PRN Glucose <70 milliGRAM(s)/deciLiter    Vital Signs Last 24 Hrs  T(C): 36.7 (13 Oct 2017 09:00), Max: 36.9 (12 Oct 2017 20:15)  T(F): 98.1 (13 Oct 2017 09:00), Max: 98.5 (12 Oct 2017 20:15)  HR: 71 (13 Oct 2017 09:00) (71 - 78)  BP: 129/66 (13 Oct 2017 09:00) (121/74 - 148/83)  RR: 17 (13 Oct 2017 09:00) (17 - 18)  SpO2: 98% (13 Oct 2017 09:00) (98% - 99%)    PHYSICAL EXAM:  . GENERAL:  Alert, no distress  · CONSTITUTIONAL: Non-verbal. Eating semi-solid food with the assistance of nurse  	  . NECK: Normal carotid upstrokes, no bruit; No JVD  · RESPIRATORY:   Chest clear anteriorly; no wheezes, rhonchi, rales.  · CARDIOVASCULAR: RRR.  Normal S1 and S2.  No murmur, rub, gallop.  . GASTROINTESTINAL:  Bowels sounds present.  Could not assess tender on palpation because patient is nonresponsive.  No organmegaly   appreciated.    . EXT:  No edema, no varicosities, 2+ pulses in upper and lower extremities.  . NEURO:   Rt hemiplegia.  Non responsive to command.    . SKIN:  No rash     TELEMETRY: NSR 60-80s    LABS:                        10.5   6.5   )-----------( 314      ( 13 Oct 2017 06:32 )             31.0     10-13    139  |  98  |  89<H>  ----------------------------<  119<H>  4.7   |  19<L>  |  7.53<H>    Ca    9.0      13 Oct 2017 06:32  Phos  5.6     10-13  Mg     2.5     10-13    PT/INR - ( 13 Oct 2017 09:59 )   PT: 17.1 sec;   INR: 1.57 ratio      Creatinine Trend: 7.53<--, 5.41<--, 7.46<--, 5.24<--, 10.11<--, 8.33<--

## 2017-10-13 NOTE — PROGRESS NOTE ADULT - ATTENDING COMMENTS
Agree with R1/R2 resident plan of care  Not in distress, agitated  Mental staus unchanged with no comprehension, no expression  - Reviewed labs, imagings  - Cardiology and EP f/u noted. Patient's daughter refused PPM, she knows the risk of bradycardia, heart block.  will speak to her again.  - c/w ASA, metprolol, amiodarone, plavix, AC bridge with heparin gtt and coumadin(INR 1.57), statin. No ACE I/ARB per Renal given hyperkalemia  - Daughter wants HD, so he has bee on HD per renal via perm cath  - DNR/DNI Agree with R1/R2 resident plan of care  Not in distress, agitated  Mental status unchanged with no comprehension, no expression  - Reviewed labs, imaging  - Cardiology and EP f/u noted. Patient's daughter refused PPM, she knows the risk of bradycardia, heart block.  will speak to her again.  - c/w ASA, metoprolol, amiodarone, Plavix, AC bridge with heparin gtt and coumadin(INR 1.57), statin. No ACE I/ARB per Renal given hyperkalemia  - Daughter wants HD, so he has bee on HD per renal via perm cath  - DNR/DNI

## 2017-10-13 NOTE — PROGRESS NOTE ADULT - PROBLEM SELECTOR PLAN 1
patient with CTH (9/23) demonstrating multifocal infarcts concerning for embolic strokes  - c/w aspirin, plavix, atorvastatin  - PT/OT following  - pending placement to subacute rehab with HD

## 2017-10-13 NOTE — PROGRESS NOTE ADULT - ASSESSMENT
72yoM, hx of CAD (DESx18), CKDm DM2, COPD, CVA (remote, no deficits), Bipolar (not on meds). Presented with flank pain, developed NSTEMI, hypertensive emergency,  TWI. [facility] on 9/14 with BABAR to pRCA.  9/21 had cardiac arrest, tele shows bradycardia, evolving intop VF.  18 minutes CPR, intubated.   9/23 with new CVA dx, new aphasia, weakness. 9/27 EF 20%      Bradycardia mediated VF arrest.   - per my conversations with patients daughter, she is agreeable towards ppm, however wanted to wait.   - would only DC if daughter understands that he will be "unprotected" from a bradycardia mediated cardiac arrest, which was the cause of his prior arrest.   - EP to continue to follow  - please maintain K>4 , Mg>2  - For any questions or If there are any concerns, please call [phone] during daytime, covered by [phone] after-hours

## 2017-10-13 NOTE — PROGRESS NOTE ADULT - ASSESSMENT
73 yo M w/PMH of extensive CAD, prior CVA, T2DM, ESRD HD via permacath,  N-STEMI and pyelonephritis, with course c/b obstructive nephropathy, s/p impella assisted LHC with DESx5, hospital course c/b cardiac arrest, V. Tach, embolic CVA, now has right hemiplegia, and is non-verbal        # NSTEMI s/p Impella-assisted Cath with BABAR x5 to LAD/RCA; complicated by subsequent VT arrest  - c/w DAPT, statin, and beta-blocker    # ICM (EF~20%)- NYHA 2-3, ACC/AHA C-currently euvolemic  - Renal failure on HD.  Nephrology advises against ACE or ARB at this time.   - c/w metoprolol at 25mg bid  - Family declining  PPM.     # Paroxysmal atrial fibrillation/flutter -currently in NSR  - cont PO amio 200mg QD  - would resume warfarin if no plans for PPM    # CVA (cerebral vascular accident) w/ CTH (9/23) demonstrating multifocal infarcts concerning for embolic strokes  - c/w coumadin  - c/w atorvastatin  - PT/OT following     Patient is awaiting approval for rehab service. 71 yo M w/PMH of extensive CAD, prior CVA, T2DM, ESRD HD via permacath,  N-STEMI and pyelonephritis, with course c/b obstructive nephropathy, s/p impella assisted LHC with DESx5, hospital course c/b cardiac arrest, V. Tach, embolic CVA, now has right hemiplegia, and is non-verbal        # NSTEMI s/p Impella-assisted Cath with BABAR x5 to LAD/RCA; complicated by subsequent VT arrest  - c/w DAPT, statin, and beta-blocker    # ICM (EF~20%)- NYHA 2-3, ACC/AHA C-currently euvolemic  - Renal failure on HD.  Nephrology advises against ACE or ARB at this time.   - c/w metoprolol at 25mg bid  - Family declining  PPM.     # Paroxysmal atrial fibrillation/flutter -currently in NSR  - cont PO amio 200mg QD  - would resume warfarin if no plans for PPM    # CVA (cerebral vascular accident) w/ CTH (9/23) demonstrating multifocal infarcts concerning for embolic strokes  - c/w coumadin  - c/w atorvastatin  - PT/OT following     Patient is awaiting approval for rehab service.      MONSERRAT Montano, MS4  [id number]    Patient seen and examined; discussed with cardiology student, MONSERRAT Montano, and fellow, RENATE Villalobos.  Hx., exam and labs as above.  I agree with the assessment and recommendations as outlined.   Erick Roy M.D.  Cardiology Consult Service  [phone] or [phone]

## 2017-10-13 NOTE — PROGRESS NOTE ADULT - PROBLEM SELECTOR PLAN 3
-remains NSR  - c/w amiodarone 200 QD per EP; continue metoprolol  - c/w coumadin 2mg, goal 2-3 INR Patient with worsening systolic dysfunction s/p NSTEMI/arrest.   - daughter denies PPM at this time, would like to wait. As per EP, reported wanting to wait closer to d/c. will cont to discuss with her.   - c/w fluid removal by intermittent HD  - c/w metoprolol 25mg BID  - cardio following

## 2017-10-13 NOTE — PROGRESS NOTE ADULT - NSHPATTENDINGPLANDISCUSS_GEN_ALL_CORE
Patient seen and examined; discussed with cardiology fellow, Dr. Lowry. Hx., exam and labs as above.  I agree with the assessment and recommendations.  Erick Roy M.D.  Cardiology Consult Service  [phone] or [phone]
to reach Cardiology Attending call during weekdays Spectra [phone].
to reach Cardiology Attending call during weekdays Spectra [phone].
to reach Cardiology Attending call during weekdays Spectra [phone].
to reach Cardiology Attending call during weekdays Spectra [phone].
to reach Cardiology Attending call during weekdays Spectra [phone].
cardiology student MONSERRAT Montano and fellow RENATE Villalobos. Patient seen and examined by me; hx., exam and labs as above.  I agree with the assessment and recommendations as outlined.               Erick Roy M.D.  Cardiology Consult Service  [phone] or [phone]
to reach Cardiology Attending call during weekdays Spectra [phone].
to reach Cardiology Attending call during weekdays Spectra [phone].
house staff
Renal, House staff
house staff

## 2017-10-13 NOTE — PROGRESS NOTE ADULT - PROBLEM SELECTOR PLAN 2
likely 2/2 immobility  - currently followed by PT/OT  - wound care consult -remains NSR  - c/w amiodarone 200 QD per EP; continue metoprolol  - increased coumadin to 3mg, goal 2-3 INR

## 2017-10-13 NOTE — PROGRESS NOTE ADULT - PROBLEM SELECTOR PLAN 4
Patient with worsening systolic dysfunction s/p NSTEMI/arrest.   - daughter does not want PPM at this time  - c/w fluid removal by intermittent HD  - c/w metoprolol 25mg BID  - cardio following likely 2/2 immobility  - currently followed by PT/OT  - wound care consult

## 2017-10-14 LAB
ANION GAP SERPL CALC-SCNC: 18 MMOL/L — HIGH (ref 5–17)
BUN SERPL-MCNC: 54 MG/DL — HIGH (ref 7–23)
CALCIUM SERPL-MCNC: 9.1 MG/DL — SIGNIFICANT CHANGE UP (ref 8.4–10.5)
CHLORIDE SERPL-SCNC: 96 MMOL/L — SIGNIFICANT CHANGE UP (ref 96–108)
CO2 SERPL-SCNC: 25 MMOL/L — SIGNIFICANT CHANGE UP (ref 22–31)
CREAT SERPL-MCNC: 5.2 MG/DL — HIGH (ref 0.5–1.3)
GLUCOSE BLDC GLUCOMTR-MCNC: 145 MG/DL — HIGH (ref 70–99)
GLUCOSE BLDC GLUCOMTR-MCNC: 155 MG/DL — HIGH (ref 70–99)
GLUCOSE BLDC GLUCOMTR-MCNC: 168 MG/DL — HIGH (ref 70–99)
GLUCOSE BLDC GLUCOMTR-MCNC: 226 MG/DL — HIGH (ref 70–99)
GLUCOSE SERPL-MCNC: 147 MG/DL — HIGH (ref 70–99)
HCT VFR BLD CALC: 34.5 % — LOW (ref 39–50)
HGB BLD-MCNC: 11.2 G/DL — LOW (ref 13–17)
INR BLD: 1.66 RATIO — HIGH (ref 0.88–1.16)
MAGNESIUM SERPL-MCNC: 2 MG/DL — SIGNIFICANT CHANGE UP (ref 1.6–2.6)
MCHC RBC-ENTMCNC: 30 PG — SIGNIFICANT CHANGE UP (ref 27–34)
MCHC RBC-ENTMCNC: 32.6 GM/DL — SIGNIFICANT CHANGE UP (ref 32–36)
MCV RBC AUTO: 92 FL — SIGNIFICANT CHANGE UP (ref 80–100)
PHOSPHATE SERPL-MCNC: 3.3 MG/DL — SIGNIFICANT CHANGE UP (ref 2.5–4.5)
PLATELET # BLD AUTO: 288 K/UL — SIGNIFICANT CHANGE UP (ref 150–400)
POTASSIUM SERPL-MCNC: 4 MMOL/L — SIGNIFICANT CHANGE UP (ref 3.5–5.3)
POTASSIUM SERPL-SCNC: 4 MMOL/L — SIGNIFICANT CHANGE UP (ref 3.5–5.3)
PROTHROM AB SERPL-ACNC: 18.1 SEC — HIGH (ref 9.8–12.7)
RBC # BLD: 3.75 M/UL — LOW (ref 4.2–5.8)
RBC # FLD: 14.9 % — HIGH (ref 10.3–14.5)
SODIUM SERPL-SCNC: 139 MMOL/L — SIGNIFICANT CHANGE UP (ref 135–145)
WBC # BLD: 6.9 K/UL — SIGNIFICANT CHANGE UP (ref 3.8–10.5)
WBC # FLD AUTO: 6.9 K/UL — SIGNIFICANT CHANGE UP (ref 3.8–10.5)

## 2017-10-14 PROCEDURE — 99233 SBSQ HOSP IP/OBS HIGH 50: CPT | Mod: GC

## 2017-10-14 RX ORDER — WARFARIN SODIUM 2.5 MG/1
3.5 TABLET ORAL ONCE
Qty: 0 | Refills: 0 | Status: COMPLETED | OUTPATIENT
Start: 2017-10-14 | End: 2017-10-14

## 2017-10-14 RX ORDER — WARFARIN SODIUM 2.5 MG/1
3 TABLET ORAL ONCE
Qty: 0 | Refills: 0 | Status: DISCONTINUED | OUTPATIENT
Start: 2017-10-14 | End: 2017-10-14

## 2017-10-14 RX ADMIN — Medication 667 MILLIGRAM(S): at 13:50

## 2017-10-14 RX ADMIN — Medication 667 MILLIGRAM(S): at 17:21

## 2017-10-14 RX ADMIN — Medication 5 MILLIGRAM(S): at 17:21

## 2017-10-14 RX ADMIN — Medication 1 TABLET(S): at 09:49

## 2017-10-14 RX ADMIN — TAMSULOSIN HYDROCHLORIDE 0.4 MILLIGRAM(S): 0.4 CAPSULE ORAL at 21:27

## 2017-10-14 RX ADMIN — CLOPIDOGREL BISULFATE 75 MILLIGRAM(S): 75 TABLET, FILM COATED ORAL at 09:50

## 2017-10-14 RX ADMIN — Medication 81 MILLIGRAM(S): at 09:50

## 2017-10-14 RX ADMIN — Medication 25 MILLIGRAM(S): at 17:21

## 2017-10-14 RX ADMIN — Medication 5 MILLIGRAM(S): at 05:55

## 2017-10-14 RX ADMIN — Medication 1: at 17:31

## 2017-10-14 RX ADMIN — Medication 1: at 09:49

## 2017-10-14 RX ADMIN — AMIODARONE HYDROCHLORIDE 200 MILLIGRAM(S): 400 TABLET ORAL at 05:55

## 2017-10-14 RX ADMIN — ATORVASTATIN CALCIUM 40 MILLIGRAM(S): 80 TABLET, FILM COATED ORAL at 21:26

## 2017-10-14 RX ADMIN — PANTOPRAZOLE SODIUM 40 MILLIGRAM(S): 20 TABLET, DELAYED RELEASE ORAL at 09:50

## 2017-10-14 RX ADMIN — WARFARIN SODIUM 3.5 MILLIGRAM(S): 2.5 TABLET ORAL at 21:27

## 2017-10-14 RX ADMIN — Medication 25 MILLIGRAM(S): at 05:54

## 2017-10-14 RX ADMIN — Medication 667 MILLIGRAM(S): at 09:49

## 2017-10-14 NOTE — PROGRESS NOTE ADULT - PROBLEM SELECTOR PLAN 1
patient with CTH (9/23) demonstrating multifocal infarcts concerning for embolic strokes  - c/w aspirin, plavix, atorvastatin  - PT/OT following  - pending placement to subacute rehab with HD -remains NSR  - c/w amiodarone 200 QD per EP; continue metoprolol  - increased coumadin to 3mg, goal 2-3 INR, trending slowly -remains NSR  - c/w amiodarone 200 QD per EP; continue metoprolol  - increased coumadin to 3mg, goal 2-3 INR, trending slowly  INR monitoring

## 2017-10-14 NOTE — PROGRESS NOTE ADULT - PROBLEM SELECTOR PLAN 3
Patient with worsening systolic dysfunction s/p NSTEMI/arrest.   - daughter denies PPM at this time, would like to wait. As per EP, reported wanting to wait closer to d/c. will cont to discuss with her.   - c/w fluid removal by intermittent HD  - c/w metoprolol 25mg BID  - cardio following

## 2017-10-14 NOTE — PROGRESS NOTE ADULT - ASSESSMENT
73 yo M w/PMH of extensive CAD, T2DM, CKD, prior CVA transferred from OSH for NSTEMI s/p Impella-assisted LHC w/BABAR x 5 and obstructive nephropathy requiring HD transferred from CCU after PEA/Vtach arrest with ROSC c/b new embolic CVA. S/p permacath placement by IR on 10/9, family does not wish to PPM at this time, pending placement to subacute rehab

## 2017-10-14 NOTE — PROGRESS NOTE ADULT - PROBLEM SELECTOR PLAN 2
-remains NSR  - c/w amiodarone 200 QD per EP; continue metoprolol  - increased coumadin to 3mg, goal 2-3 INR -remains NSR  - c/w amiodarone 200 QD per EP; continue metoprolol  - increased coumadin to 3mg, goal 2-3 INR, trending slowly patient with CTH (9/23) demonstrating multifocal infarcts concerning for embolic strokes  - c/w aspirin, plavix, atorvastatin  - PT/OT following  - pending placement to subacute rehab with HD

## 2017-10-14 NOTE — PROGRESS NOTE ADULT - SUBJECTIVE AND OBJECTIVE BOX
Patient is a 72y old  Male who presents with a chief complaint of s/p cardiac cath (22 Sep 2017 12:34)        SUBJECTIVE / OVERNIGHT EVENTS:      MEDICATIONS  (STANDING):  amiodarone    Tablet 200 milliGRAM(s) Oral daily  aspirin  chewable 81 milliGRAM(s) Oral daily  atorvastatin 40 milliGRAM(s) Oral at bedtime  busPIRone 5 milliGRAM(s) Oral two times a day  calcium acetate 667 milliGRAM(s) Oral three times a day with meals  clopidogrel Tablet 75 milliGRAM(s) Oral daily  dextrose 5%. 1000 milliLiter(s) (50 mL/Hr) IV Continuous <Continuous>  dextrose 50% Injectable 12.5 Gram(s) IV Push once  dextrose 50% Injectable 25 Gram(s) IV Push once  dextrose 50% Injectable 25 Gram(s) IV Push once  epoetin georgie Injectable 07709 Unit(s) IV Push every other day  insulin lispro (HumaLOG) corrective regimen sliding scale   SubCutaneous three times a day before meals  insulin lispro (HumaLOG) corrective regimen sliding scale   SubCutaneous at bedtime  metoprolol 25 milliGRAM(s) Oral two times a day  Nephro-sophie 1 Tablet(s) Oral daily  pantoprazole  Injectable 40 milliGRAM(s) IV Push daily  tamsulosin 0.4 milliGRAM(s) Oral at bedtime    MEDICATIONS  (PRN):  dextrose Gel 1 Dose(s) Oral once PRN Blood Glucose LESS THAN 70 milliGRAM(s)/deciLiter  glucagon  Injectable 1 milliGRAM(s) IntraMuscular once PRN Glucose <70 milliGRAM(s)/deciLiter      T(C): 37.3 (10-14-17 @ 05:53), Max: 37.3 (10-14-17 @ 05:53)  HR: 85 (10-14-17 @ 05:53) (71 - 95)  BP: 135/74 (10-14-17 @ 05:53) (124/76 - 138/70)  RR: 19 (10-14-17 @ 05:53) (17 - 20)  SpO2: 95% (10-14-17 @ 05:53) (95% - 99%)  CAPILLARY BLOOD GLUCOSE  137 (13 Oct 2017 07:50)      POCT Blood Glucose.: 238 mg/dL (13 Oct 2017 20:54)  POCT Blood Glucose.: 247 mg/dL (13 Oct 2017 18:06)  POCT Blood Glucose.: 117 mg/dL (13 Oct 2017 13:11)  POCT Blood Glucose.: 137 mg/dL (13 Oct 2017 07:48)    I&O's Summary    13 Oct 2017 07:01  -  14 Oct 2017 07:00  --------------------------------------------------------  IN: 520 mL / OUT: 1000 mL / NET: -480 mL        PHYSICAL EXAM  GENERAL: NAD, well-developed  HEAD:  Atraumatic, Normocephalic  EYES: EOMI, PERRLA, conjunctiva and sclera clear  NECK: Supple, No JVD  CHEST/LUNG: Clear to auscultation bilaterally; No wheeze  HEART: Regular rate and rhythm; No murmurs, rubs, or gallops  ABDOMEN: Soft, Nontender, Nondistended; Bowel sounds present  EXTREMITIES:  2+ Peripheral Pulses, No clubbing, cyanosis, or edema  PSYCH: AAOx3  SKIN: No rashes or lesions    LABS:                        10.5   6.5   )-----------( 314      ( 13 Oct 2017 06:32 )             31.0     10-14    139  |  96  |  54<H>  ----------------------------<  147<H>  4.0   |  25  |  5.20<H>    Ca    9.1      14 Oct 2017 06:45  Phos  3.3     10-14  Mg     2.0     10-14      PT/INR - ( 14 Oct 2017 06:45 )   PT: 18.1 sec;   INR: 1.66 ratio                   RADIOLOGY & ADDITIONAL TESTS:    Imaging Personally Reviewed:  Consultant(s) Notes Reviewed:    Care Discussed with Consultants/Other Providers: Patient is a 72y old  Male who presents with a chief complaint of s/p cardiac cath (22 Sep 2017 12:34)        SUBJECTIVE / OVERNIGHT EVENTS: Pt non verbal with right hemiplegia. PEnding placement to [facility]      MEDICATIONS  (STANDING):  amiodarone    Tablet 200 milliGRAM(s) Oral daily  aspirin  chewable 81 milliGRAM(s) Oral daily  atorvastatin 40 milliGRAM(s) Oral at bedtime  busPIRone 5 milliGRAM(s) Oral two times a day  calcium acetate 667 milliGRAM(s) Oral three times a day with meals  clopidogrel Tablet 75 milliGRAM(s) Oral daily  dextrose 5%. 1000 milliLiter(s) (50 mL/Hr) IV Continuous <Continuous>  dextrose 50% Injectable 12.5 Gram(s) IV Push once  dextrose 50% Injectable 25 Gram(s) IV Push once  dextrose 50% Injectable 25 Gram(s) IV Push once  epoetin georgie Injectable 65805 Unit(s) IV Push every other day  insulin lispro (HumaLOG) corrective regimen sliding scale   SubCutaneous three times a day before meals  insulin lispro (HumaLOG) corrective regimen sliding scale   SubCutaneous at bedtime  metoprolol 25 milliGRAM(s) Oral two times a day  Nephro-sophie 1 Tablet(s) Oral daily  pantoprazole  Injectable 40 milliGRAM(s) IV Push daily  tamsulosin 0.4 milliGRAM(s) Oral at bedtime    MEDICATIONS  (PRN):  dextrose Gel 1 Dose(s) Oral once PRN Blood Glucose LESS THAN 70 milliGRAM(s)/deciLiter  glucagon  Injectable 1 milliGRAM(s) IntraMuscular once PRN Glucose <70 milliGRAM(s)/deciLiter      T(C): 37.3 (10-14-17 @ 05:53), Max: 37.3 (10-14-17 @ 05:53)  HR: 85 (10-14-17 @ 05:53) (71 - 95)  BP: 135/74 (10-14-17 @ 05:53) (124/76 - 138/70)  RR: 19 (10-14-17 @ 05:53) (17 - 20)  SpO2: 95% (10-14-17 @ 05:53) (95% - 99%)  CAPILLARY BLOOD GLUCOSE  137 (13 Oct 2017 07:50)      POCT Blood Glucose.: 238 mg/dL (13 Oct 2017 20:54)  POCT Blood Glucose.: 247 mg/dL (13 Oct 2017 18:06)  POCT Blood Glucose.: 117 mg/dL (13 Oct 2017 13:11)  POCT Blood Glucose.: 137 mg/dL (13 Oct 2017 07:48)    I&O's Summary    13 Oct 2017 07:01  -  14 Oct 2017 07:00  --------------------------------------------------------  IN: 520 mL / OUT: 1000 mL / NET: -480 mL        PHYSICAL EXAM  GENERAL: NAD, well-developed  HEAD:  Atraumatic, Normocephalic  EYES: EOMI, PERRLA, conjunctiva and sclera clear  NECK: Supple, No JVD  CHEST/LUNG: Clear to auscultation bilaterally; No wheeze  HEART: Regular rate and rhythm; No murmurs, rubs, or gallops  ABDOMEN: Soft, Nontender, Nondistended; Bowel sounds present  EXTREMITIES:  2+ Peripheral Pulses, No clubbing, cyanosis, or edema  PSYCH: AAOx3  SKIN: R thorax permacath in place. left groin ecchymosis    LABS:                                 11.2   6.9   )-----------( 288      ( 14 Oct 2017 06:45 )             34.5     10-14    139  |  96  |  54<H>  ----------------------------<  147<H>  4.0   |  25  |  5.20<H>    Ca    9.1      14 Oct 2017 06:45  Phos  3.3     10-14  Mg     2.0     10-14      PT/INR - ( 14 Oct 2017 06:45 )   PT: 18.1 sec;   INR: 1.66 ratio            RADIOLOGY & ADDITIONAL TESTS:    Imaging Personally Reviewed:  Consultant(s) Notes Reviewed:    Care Discussed with Consultants/Other Providers:

## 2017-10-15 LAB
ANION GAP SERPL CALC-SCNC: 21 MMOL/L — HIGH (ref 5–17)
BUN SERPL-MCNC: 87 MG/DL — HIGH (ref 7–23)
CALCIUM SERPL-MCNC: 9.2 MG/DL — SIGNIFICANT CHANGE UP (ref 8.4–10.5)
CHLORIDE SERPL-SCNC: 97 MMOL/L — SIGNIFICANT CHANGE UP (ref 96–108)
CO2 SERPL-SCNC: 23 MMOL/L — SIGNIFICANT CHANGE UP (ref 22–31)
CREAT SERPL-MCNC: 7.08 MG/DL — HIGH (ref 0.5–1.3)
GLUCOSE BLDC GLUCOMTR-MCNC: 142 MG/DL — HIGH (ref 70–99)
GLUCOSE BLDC GLUCOMTR-MCNC: 156 MG/DL — HIGH (ref 70–99)
GLUCOSE BLDC GLUCOMTR-MCNC: 211 MG/DL — HIGH (ref 70–99)
GLUCOSE BLDC GLUCOMTR-MCNC: 215 MG/DL — HIGH (ref 70–99)
GLUCOSE SERPL-MCNC: 127 MG/DL — HIGH (ref 70–99)
HCT VFR BLD CALC: 31.6 % — LOW (ref 39–50)
HGB BLD-MCNC: 10.5 G/DL — LOW (ref 13–17)
INR BLD: 2.25 RATIO — HIGH (ref 0.88–1.16)
MAGNESIUM SERPL-MCNC: 2.3 MG/DL — SIGNIFICANT CHANGE UP (ref 1.6–2.6)
MCHC RBC-ENTMCNC: 30.5 PG — SIGNIFICANT CHANGE UP (ref 27–34)
MCHC RBC-ENTMCNC: 33.3 GM/DL — SIGNIFICANT CHANGE UP (ref 32–36)
MCV RBC AUTO: 91.4 FL — SIGNIFICANT CHANGE UP (ref 80–100)
PHOSPHATE SERPL-MCNC: 5.4 MG/DL — HIGH (ref 2.5–4.5)
PLATELET # BLD AUTO: 284 K/UL — SIGNIFICANT CHANGE UP (ref 150–400)
POTASSIUM SERPL-MCNC: 4.2 MMOL/L — SIGNIFICANT CHANGE UP (ref 3.5–5.3)
POTASSIUM SERPL-SCNC: 4.2 MMOL/L — SIGNIFICANT CHANGE UP (ref 3.5–5.3)
PROTHROM AB SERPL-ACNC: 24.9 SEC — HIGH (ref 9.8–12.7)
RBC # BLD: 3.46 M/UL — LOW (ref 4.2–5.8)
RBC # FLD: 14.4 % — SIGNIFICANT CHANGE UP (ref 10.3–14.5)
SODIUM SERPL-SCNC: 141 MMOL/L — SIGNIFICANT CHANGE UP (ref 135–145)
WBC # BLD: 7.8 K/UL — SIGNIFICANT CHANGE UP (ref 3.8–10.5)
WBC # FLD AUTO: 7.8 K/UL — SIGNIFICANT CHANGE UP (ref 3.8–10.5)

## 2017-10-15 PROCEDURE — 99233 SBSQ HOSP IP/OBS HIGH 50: CPT | Mod: GC

## 2017-10-15 PROCEDURE — 99232 SBSQ HOSP IP/OBS MODERATE 35: CPT | Mod: GC

## 2017-10-15 RX ORDER — WARFARIN SODIUM 2.5 MG/1
3 TABLET ORAL ONCE
Qty: 0 | Refills: 0 | Status: DISCONTINUED | OUTPATIENT
Start: 2017-10-15 | End: 2017-10-15

## 2017-10-15 RX ADMIN — Medication 81 MILLIGRAM(S): at 12:53

## 2017-10-15 RX ADMIN — Medication 25 MILLIGRAM(S): at 06:30

## 2017-10-15 RX ADMIN — Medication 5 MILLIGRAM(S): at 17:39

## 2017-10-15 RX ADMIN — Medication 1: at 18:14

## 2017-10-15 RX ADMIN — TAMSULOSIN HYDROCHLORIDE 0.4 MILLIGRAM(S): 0.4 CAPSULE ORAL at 22:39

## 2017-10-15 RX ADMIN — Medication 2: at 13:39

## 2017-10-15 RX ADMIN — Medication 667 MILLIGRAM(S): at 12:54

## 2017-10-15 RX ADMIN — Medication 667 MILLIGRAM(S): at 17:39

## 2017-10-15 RX ADMIN — Medication 5 MILLIGRAM(S): at 06:28

## 2017-10-15 RX ADMIN — Medication 25 MILLIGRAM(S): at 17:54

## 2017-10-15 RX ADMIN — CLOPIDOGREL BISULFATE 75 MILLIGRAM(S): 75 TABLET, FILM COATED ORAL at 12:58

## 2017-10-15 RX ADMIN — PANTOPRAZOLE SODIUM 40 MILLIGRAM(S): 20 TABLET, DELAYED RELEASE ORAL at 12:58

## 2017-10-15 RX ADMIN — Medication 1 TABLET(S): at 12:53

## 2017-10-15 RX ADMIN — ATORVASTATIN CALCIUM 40 MILLIGRAM(S): 80 TABLET, FILM COATED ORAL at 22:39

## 2017-10-15 RX ADMIN — AMIODARONE HYDROCHLORIDE 200 MILLIGRAM(S): 400 TABLET ORAL at 06:28

## 2017-10-15 RX ADMIN — Medication 667 MILLIGRAM(S): at 09:28

## 2017-10-15 NOTE — PROGRESS NOTE ADULT - PROBLEM SELECTOR PLAN 1
- patient remains in NSR  - c/w amiodarone 200 QD per EP; continue metoprolol  - c/w coumadin 3mg, goal 2-3 INR, now therapeutic  - daily INR checks and coumadin dosing - patient remains in NSR  - c/w amiodarone 200 QD per EP; continue metoprolol  - c/w coumadin 3mg, goal 2-3 INR, now therapeutic/ But is placed on HOLD as per EP for possible PPM if daughter agrees.    - daily INR checks

## 2017-10-15 NOTE — PROGRESS NOTE ADULT - SUBJECTIVE AND OBJECTIVE BOX
Patient is a 72y old  Male who presents with a chief complaint of s/p cardiac cath (22 Sep 2017 12:34)      SUBJECTIVE / OVERNIGHT EVENTS: No acute events overnight. Patient remains non-verbal, following some commands.    MEDICATIONS  (STANDING):  amiodarone    Tablet 200 milliGRAM(s) Oral daily  aspirin  chewable 81 milliGRAM(s) Oral daily  atorvastatin 40 milliGRAM(s) Oral at bedtime  busPIRone 5 milliGRAM(s) Oral two times a day  calcium acetate 667 milliGRAM(s) Oral three times a day with meals  clopidogrel Tablet 75 milliGRAM(s) Oral daily  dextrose 5%. 1000 milliLiter(s) (50 mL/Hr) IV Continuous <Continuous>  dextrose 50% Injectable 12.5 Gram(s) IV Push once  dextrose 50% Injectable 25 Gram(s) IV Push once  dextrose 50% Injectable 25 Gram(s) IV Push once  epoetin georgie Injectable 26766 Unit(s) IV Push every other day  insulin lispro (HumaLOG) corrective regimen sliding scale   SubCutaneous three times a day before meals  insulin lispro (HumaLOG) corrective regimen sliding scale   SubCutaneous at bedtime  metoprolol 25 milliGRAM(s) Oral two times a day  Nephro-sophie 1 Tablet(s) Oral daily  pantoprazole  Injectable 40 milliGRAM(s) IV Push daily  tamsulosin 0.4 milliGRAM(s) Oral at bedtime  warfarin 3 milliGRAM(s) Oral once    MEDICATIONS  (PRN):  dextrose Gel 1 Dose(s) Oral once PRN Blood Glucose LESS THAN 70 milliGRAM(s)/deciLiter  glucagon  Injectable 1 milliGRAM(s) IntraMuscular once PRN Glucose <70 milliGRAM(s)/deciLiter    CAPILLARY BLOOD GLUCOSE  168 (14 Oct 2017 17:32)  POCT Blood Glucose.: 226 mg/dL (14 Oct 2017 21:47)  POCT Blood Glucose.: 168 mg/dL (14 Oct 2017 17:23)  POCT Blood Glucose.: 145 mg/dL (14 Oct 2017 13:10)  POCT Blood Glucose.: 155 mg/dL (14 Oct 2017 09:18)    I&O's Summary    14 Oct 2017 07:01  -  15 Oct 2017 07:00  --------------------------------------------------------  IN: 500 mL / OUT: 0 mL / NET: 500 mL    PHYSICAL EXAM:  GENERAL: NAD, well-developed  HEAD:  Atraumatic, Normocephalic  EYES: EOMI, PERRLA, conjunctiva and sclera clear  NECK: Supple, No JVD  CHEST/LUNG: Clear to auscultation bilaterally; No wheeze  HEART: Regular rate and rhythm; No murmurs, rubs, or gallops  ABDOMEN: Soft, Nontender, Nondistended; Bowel sounds present  EXTREMITIES:  2+ Peripheral Pulses, No clubbing, cyanosis, or edema  PSYCH: awake and alert  NEUROLOGY: non-focal  SKIN: No rashes or lesions    LABS:                        10.5   7.8   )-----------( 284      ( 15 Oct 2017 06:36 )             31.6     10-15    141  |  97  |  87<H>  ----------------------------<  127<H>  4.2   |  23  |  7.08<H>    Ca    9.2      15 Oct 2017 06:36  Phos  5.4     10-15  Mg     2.3     10-15      PT/INR - ( 15 Oct 2017 06:36 )   PT: 24.9 sec;   INR: 2.25 ratio        RADIOLOGY & ADDITIONAL TESTS:    Imaging Personally Reviewed:    Consultant(s) Notes Reviewed:      Care Discussed with Consultants/Other Providers:

## 2017-10-15 NOTE — PROGRESS NOTE ADULT - PROBLEM SELECTOR PLAN 6
Patient initially presented with NSTEMI, s/p Impella-assisted Cath with BABAR x5 to LAD/RCA; complicated by subsequent PEA arrest  - c/w DAPT, statin, and beta-blocker  -  per renal, no plan to start ACE/ARB given ESRD and hyperkalemia  - cards following

## 2017-10-15 NOTE — PROGRESS NOTE ADULT - PROBLEM SELECTOR PLAN 3
Patient with worsening systolic dysfunction s/p NSTEMI/arrest  - daughter declining PPM at this time, would like to wait  - as per EP, reported wanting to wait closer to d/c. will cont to discuss with her   - c/w fluid removal by intermittent HD  - c/w metoprolol 25mg BID  - cardio following

## 2017-10-15 NOTE — PROGRESS NOTE ADULT - ASSESSMENT
72 white male with a history of cardiac arrest, CVA, AF, Sepsis, LIN on CKD now ESRD on HD.  possible PPM tomorrow.  for routine dialysis via the perma cath tomorrow.   labs and medications reviewed. will follow.

## 2017-10-15 NOTE — PROGRESS NOTE ADULT - SUBJECTIVE AND OBJECTIVE BOX
JIMI BUCHANAN  72y  Male    Patient is a 72y old  Male who presents with a chief complaint of s/p cardiac cath (22 Sep 2017 12:34)    lethargic but arousable.         PAST MEDICAL & SURGICAL HISTORY:  CVA (cerebral vascular accident)  COPD (chronic obstructive pulmonary disease)  BPH (benign prostatic hyperplasia)  Bipolar affective disorder  CKD (chronic kidney disease)  Pancreatitis  Hypertension  Dyslipidemia  Diabetes mellitus  Coronary artery disease  HLD (hyperlipidemia)  Anemia  Acute renal failure  CKD (chronic kidney disease)  COPD (chronic obstructive pulmonary disease)  Fainting  Cardiac arrhythmia  Dizziness  Hydronephrosis  Cholecystitis  Bipolar 1 disorder  DM (diabetes mellitus)  HTN (hypertension)  Lumbar radiculopathy  Left heart failure  Reflux esophagitis  Coronary atherosclerosis  History of cardiac catheterization  No significant past surgical history          PHYSICAL EXAM:    T(C): 37.4 (10-15-17 @ 12:15), Max: 37.4 (10-15-17 @ 12:15)  HR: 76 (10-15-17 @ 12:15) (70 - 77)  BP: 125/74 (10-15-17 @ 12:15) (103/64 - 152/82)  RR: 18 (10-15-17 @ 12:15) (18 - 18)  SpO2: 97% (10-15-17 @ 12:15) (97% - 100%)  Wt(kg): --    I&O's Detail    14 Oct 2017 07:01  -  15 Oct 2017 07:00  --------------------------------------------------------  IN:    Oral Fluid: 500 mL  Total IN: 500 mL    OUT:  Total OUT: 0 mL    Total NET: 500 mL      15 Oct 2017 07:01  -  15 Oct 2017 15:59  --------------------------------------------------------  IN:    Oral Fluid: 740 mL  Total IN: 740 mL    OUT:  Total OUT: 0 mL    Total NET: 740 mL          Respiratory: clear anteriorly, decreased BS at bases  Cardiovascular: S1 S2  Gastrointestinal: soft NT ND +BS  Extremities: edema   Neuro: lethargic     MEDICATIONS  (STANDING):  amiodarone    Tablet 200 milliGRAM(s) Oral daily  aspirin  chewable 81 milliGRAM(s) Oral daily  atorvastatin 40 milliGRAM(s) Oral at bedtime  busPIRone 5 milliGRAM(s) Oral two times a day  calcium acetate 667 milliGRAM(s) Oral three times a day with meals  clopidogrel Tablet 75 milliGRAM(s) Oral daily  dextrose 5%. 1000 milliLiter(s) (50 mL/Hr) IV Continuous <Continuous>  dextrose 50% Injectable 12.5 Gram(s) IV Push once  dextrose 50% Injectable 25 Gram(s) IV Push once  dextrose 50% Injectable 25 Gram(s) IV Push once  epoetin georgie Injectable 24715 Unit(s) IV Push every other day  insulin lispro (HumaLOG) corrective regimen sliding scale   SubCutaneous three times a day before meals  insulin lispro (HumaLOG) corrective regimen sliding scale   SubCutaneous at bedtime  metoprolol 25 milliGRAM(s) Oral two times a day  Nephro-sophie 1 Tablet(s) Oral daily  pantoprazole  Injectable 40 milliGRAM(s) IV Push daily  tamsulosin 0.4 milliGRAM(s) Oral at bedtime    MEDICATIONS  (PRN):  dextrose Gel 1 Dose(s) Oral once PRN Blood Glucose LESS THAN 70 milliGRAM(s)/deciLiter  glucagon  Injectable 1 milliGRAM(s) IntraMuscular once PRN Glucose <70 milliGRAM(s)/deciLiter                            10.5   7.8   )-----------( 284      ( 15 Oct 2017 06:36 )             31.6       10-15    141  |  97  |  87<H>  ----------------------------<  127<H>  4.2   |  23  |  7.08<H>    Ca    9.2      15 Oct 2017 06:36  Phos  5.4     10-15  Mg     2.3     10-15

## 2017-10-15 NOTE — PROGRESS NOTE ADULT - PROBLEM SELECTOR PLAN 5
patient with new ESRD on HD, c/b hyperkalemia   - avoid nephrotoxins, renally dose meds  - c/w HD via permacath (placed 10/9) per nephrology; HD M/W/F patient with new ESRD on HD, c/b hyperkalemia which is now resolved  - avoid nephrotoxins, renally dose meds  - c/w HD via permacath (placed 10/9) per nephrology; HD M/W/F

## 2017-10-15 NOTE — PROGRESS NOTE ADULT - PROBLEM SELECTOR PLAN 2
patient with CTH (9/23) demonstrating multifocal infarcts concerning for embolic strokes  - c/w aspirin, Plavix, atorvastatin  - PT/OT following  - pending placement to subacute rehab that offers HD

## 2017-10-15 NOTE — CHART NOTE - NSCHARTNOTEFT_GEN_A_CORE
Attempted to speak to patient using Icelandic , however, he is non verbal currently. Called daughter Anisa Lara and she states that she is not ready for PPM now. She will be in tomorrow to further discuss. For now...    **please make patient NPO after midnight**    EP   [id number]; evening [id number] Attempted to speak to patient using Taiwanese , however, he is non verbal currently. Called daughter Anisa Lara and she states that she is not ready for PPM now. She will be in tomorrow to further discuss. For now...    **please make patient NPO after midnight**  **hold coumadin, start hep gtt for INR <2**    in case daughter is agreeable to procedure.    EP   [id number]; evening [id number] Attempted to speak to patient using Kenyan , however, he is non verbal currently. Called daughter Anisa Lara and she states that she is not ready for PPM now. She will be in tomorrow to further discuss. For now...    **please make patient NPO after midnight / adjust DM meds**  **hold coumadin, start hep gtt for INR <2**    in case daughter is agreeable to procedure.    EP   [id number]; evening [id number]

## 2017-10-15 NOTE — PROGRESS NOTE ADULT - PROBLEM SELECTOR PLAN 7
Patient with new dysphagia in the setting of CVA  - c/w dysphagia 1, honey nectar consistency diet  - tolerating diet, continue to monitor

## 2017-10-15 NOTE — PROGRESS NOTE ADULT - ASSESSMENT
73 yo M w/PMH of extensive CAD, T2DM, CKD, prior CVA transferred from OSH for NSTEMI s/p Impella-assisted LHC w/BABAR x 5 and obstructive nephropathy requiring HD transferred from CCU after PEA/Vtach arrest with ROSC c/b new embolic CVA. S/p permacath placement by IR on 10/9, family does not wish to PPM at this time, pending placement to subacute rehab 71 yo M w/PMH of extensive CAD, T2DM, CKD, prior CVA transferred from OSH for NSTEMI s/p Impella-assisted LHC w/BABAR x 5 and obstructive nephropathy requiring HD transferred from CCU after PEA/Vtach arrest with ROSC c/b new embolic CVA. S/p permacath placement by IR on 10/9, family does not wish to PPM at this time, pending placement to subacute rehab.  Daughter will come to the hospital to discuss with EP team about PPM

## 2017-10-16 LAB
ANION GAP SERPL CALC-SCNC: 23 MMOL/L — HIGH (ref 5–17)
BASOPHILS # BLD AUTO: 0.1 K/UL — SIGNIFICANT CHANGE UP (ref 0–0.2)
BASOPHILS NFR BLD AUTO: 0.7 % — SIGNIFICANT CHANGE UP (ref 0–2)
BUN SERPL-MCNC: 106 MG/DL — HIGH (ref 7–23)
CALCIUM SERPL-MCNC: 9.2 MG/DL — SIGNIFICANT CHANGE UP (ref 8.4–10.5)
CHLORIDE SERPL-SCNC: 95 MMOL/L — LOW (ref 96–108)
CO2 SERPL-SCNC: 20 MMOL/L — LOW (ref 22–31)
CREAT SERPL-MCNC: 8.19 MG/DL — HIGH (ref 0.5–1.3)
EOSINOPHIL # BLD AUTO: 0.2 K/UL — SIGNIFICANT CHANGE UP (ref 0–0.5)
EOSINOPHIL NFR BLD AUTO: 3.4 % — SIGNIFICANT CHANGE UP (ref 0–6)
GLUCOSE BLDC GLUCOMTR-MCNC: 122 MG/DL — HIGH (ref 70–99)
GLUCOSE BLDC GLUCOMTR-MCNC: 144 MG/DL — HIGH (ref 70–99)
GLUCOSE BLDC GLUCOMTR-MCNC: 169 MG/DL — HIGH (ref 70–99)
GLUCOSE BLDC GLUCOMTR-MCNC: 185 MG/DL — HIGH (ref 70–99)
GLUCOSE SERPL-MCNC: 137 MG/DL — HIGH (ref 70–99)
HCT VFR BLD CALC: 33.9 % — LOW (ref 39–50)
HGB BLD-MCNC: 10.6 G/DL — LOW (ref 13–17)
INR BLD: 2.15 RATIO — HIGH (ref 0.88–1.16)
LYMPHOCYTES # BLD AUTO: 1.7 K/UL — SIGNIFICANT CHANGE UP (ref 1–3.3)
LYMPHOCYTES # BLD AUTO: 24.9 % — SIGNIFICANT CHANGE UP (ref 13–44)
MAGNESIUM SERPL-MCNC: 2.4 MG/DL — SIGNIFICANT CHANGE UP (ref 1.6–2.6)
MCHC RBC-ENTMCNC: 28.3 PG — SIGNIFICANT CHANGE UP (ref 27–34)
MCHC RBC-ENTMCNC: 31.1 GM/DL — LOW (ref 32–36)
MCV RBC AUTO: 91 FL — SIGNIFICANT CHANGE UP (ref 80–100)
MONOCYTES # BLD AUTO: 0.5 K/UL — SIGNIFICANT CHANGE UP (ref 0–0.9)
MONOCYTES NFR BLD AUTO: 7.2 % — SIGNIFICANT CHANGE UP (ref 2–14)
NEUTROPHILS # BLD AUTO: 4.3 K/UL — SIGNIFICANT CHANGE UP (ref 1.8–7.4)
NEUTROPHILS NFR BLD AUTO: 63.8 % — SIGNIFICANT CHANGE UP (ref 43–77)
PHOSPHATE SERPL-MCNC: 5.2 MG/DL — HIGH (ref 2.5–4.5)
PLATELET # BLD AUTO: 323 K/UL — SIGNIFICANT CHANGE UP (ref 150–400)
POTASSIUM SERPL-MCNC: 4.6 MMOL/L — SIGNIFICANT CHANGE UP (ref 3.5–5.3)
POTASSIUM SERPL-SCNC: 4.6 MMOL/L — SIGNIFICANT CHANGE UP (ref 3.5–5.3)
PROTHROM AB SERPL-ACNC: 23.6 SEC — HIGH (ref 9.8–12.7)
RBC # BLD: 3.73 M/UL — LOW (ref 4.2–5.8)
RBC # FLD: 14.7 % — HIGH (ref 10.3–14.5)
SODIUM SERPL-SCNC: 138 MMOL/L — SIGNIFICANT CHANGE UP (ref 135–145)
WBC # BLD: 6.8 K/UL — SIGNIFICANT CHANGE UP (ref 3.8–10.5)
WBC # FLD AUTO: 6.8 K/UL — SIGNIFICANT CHANGE UP (ref 3.8–10.5)

## 2017-10-16 PROCEDURE — 99232 SBSQ HOSP IP/OBS MODERATE 35: CPT | Mod: GC

## 2017-10-16 PROCEDURE — 99233 SBSQ HOSP IP/OBS HIGH 50: CPT | Mod: GC

## 2017-10-16 RX ORDER — WARFARIN SODIUM 2.5 MG/1
3 TABLET ORAL ONCE
Qty: 0 | Refills: 0 | Status: COMPLETED | OUTPATIENT
Start: 2017-10-16 | End: 2017-10-16

## 2017-10-16 RX ADMIN — Medication 5 MILLIGRAM(S): at 06:10

## 2017-10-16 RX ADMIN — CLOPIDOGREL BISULFATE 75 MILLIGRAM(S): 75 TABLET, FILM COATED ORAL at 12:22

## 2017-10-16 RX ADMIN — AMIODARONE HYDROCHLORIDE 200 MILLIGRAM(S): 400 TABLET ORAL at 06:10

## 2017-10-16 RX ADMIN — TAMSULOSIN HYDROCHLORIDE 0.4 MILLIGRAM(S): 0.4 CAPSULE ORAL at 21:21

## 2017-10-16 RX ADMIN — Medication 667 MILLIGRAM(S): at 12:23

## 2017-10-16 RX ADMIN — Medication 5 MILLIGRAM(S): at 18:08

## 2017-10-16 RX ADMIN — Medication 25 MILLIGRAM(S): at 18:09

## 2017-10-16 RX ADMIN — Medication 81 MILLIGRAM(S): at 12:23

## 2017-10-16 RX ADMIN — ATORVASTATIN CALCIUM 40 MILLIGRAM(S): 80 TABLET, FILM COATED ORAL at 21:21

## 2017-10-16 RX ADMIN — Medication 1: at 13:27

## 2017-10-16 RX ADMIN — PANTOPRAZOLE SODIUM 40 MILLIGRAM(S): 20 TABLET, DELAYED RELEASE ORAL at 12:25

## 2017-10-16 RX ADMIN — Medication 667 MILLIGRAM(S): at 18:08

## 2017-10-16 RX ADMIN — WARFARIN SODIUM 3 MILLIGRAM(S): 2.5 TABLET ORAL at 21:21

## 2017-10-16 RX ADMIN — Medication 25 MILLIGRAM(S): at 06:13

## 2017-10-16 RX ADMIN — Medication 1 TABLET(S): at 12:22

## 2017-10-16 NOTE — PROGRESS NOTE ADULT - SUBJECTIVE AND OBJECTIVE BOX
Interval History: No acute issues overnight.    Review Of Systems:  Constitutional: [ ] Fever [ ] Chills [ ] Fatigue [ ] Weight change   HEENT: [ ] Blurred vision [ ] Eye Pain [ ] Headache [ ] Runny nose [ ] Sore Throat   Respiratory: [ ] Cough [ ] Wheezing [ ] Shortness of breath  Cardiovascular: [ ] Chest Pain [ ] Palpitations [ ] CHAMPION [ ] PND [ ] Orthopnea  Gastrointestinal: [ ] Abdominal Pain [ ] Diarrhea [ ] Constipation [ ] Hemorrhoids [ ] Nausea [ ] Vomiting  Genitourinary: [ ] Nocturia [ ] Dysuria [ ] Incontinence  Extremities: [ ] Swelling [ ] Joint Pain  Neurologic: [ ] Focal deficit [ ] Paresthesias [ ] Syncope  Lymphatic: [ ] Swelling [ ] Lymphadenopathy   Skin: [ ] Rash [ ] Ecchymoses [ ] Wounds [ ] Lesions  Psychiatry: [ ] Depression [ ] Suicidal/Homicidal Ideation [ ] Anxiety [ ] Sleep Disturbances  [ ] 10 point review of systems is otherwise negative except as mentioned above            [ x]Unable to obtain    Medications:  amiodarone    Tablet 200 milliGRAM(s) Oral daily  aspirin  chewable 81 milliGRAM(s) Oral daily  atorvastatin 40 milliGRAM(s) Oral at bedtime  busPIRone 5 milliGRAM(s) Oral two times a day  calcium acetate 667 milliGRAM(s) Oral three times a day with meals  clopidogrel Tablet 75 milliGRAM(s) Oral daily  dextrose 5%. 1000 milliLiter(s) IV Continuous <Continuous>  dextrose 50% Injectable 12.5 Gram(s) IV Push once  dextrose 50% Injectable 25 Gram(s) IV Push once  dextrose 50% Injectable 25 Gram(s) IV Push once  dextrose Gel 1 Dose(s) Oral once PRN  epoetin georgie Injectable 52411 Unit(s) IV Push every other day  glucagon  Injectable 1 milliGRAM(s) IntraMuscular once PRN  insulin lispro (HumaLOG) corrective regimen sliding scale   SubCutaneous three times a day before meals  insulin lispro (HumaLOG) corrective regimen sliding scale   SubCutaneous at bedtime  metoprolol 25 milliGRAM(s) Oral two times a day  Nephro-sophie 1 Tablet(s) Oral daily  pantoprazole  Injectable 40 milliGRAM(s) IV Push daily  tamsulosin 0.4 milliGRAM(s) Oral at bedtime  warfarin 3 milliGRAM(s) Oral once      Vitals:  T(C): 36.7 (10-16-17 @ 16:50), Max: 37.3 (10-15-17 @ 20:34)  HR: 83 (10-16-17 @ 16:50) (72 - 83)  BP: 123/68 (10-16-17 @ 16:50) (100/63 - 135/72)  BP(mean): --  RR: 18 (10-16-17 @ 16:50) (18 - 18)  SpO2: 96% (10-16-17 @ 16:50) (96% - 98%)  Wt(kg): --  Daily     Daily Weight in k (16 Oct 2017 16:50)  I&O's Summary    15 Oct 2017 07:  -  16 Oct 2017 07:00  --------------------------------------------------------  IN: 1100 mL / OUT: 0 mL / NET: 1100 mL    16 Oct 2017 07:01  -  16 Oct 2017 18:58  --------------------------------------------------------  IN: 240 mL / OUT: 1000 mL / NET: -760 mL        Physical Exam:  · CONSTITUTIONAL:  Nonverbal.  Lies in bed.  Limited response to commands.  Lethargic. Restricted facial expression  · NECK:	No bruits; no JVD  · RESPIRATORY:  Minimal breath sounds.  CTA bilaterally.  No rhonchi, wheezes, rales appreciated   · CARDIOVASCULAR:   PMI not displaced.  Regular rate and rhythm.  S1/S2 normal.  No rub, murmur or gallop.  · EXTREMITIES: No cyanosis, clubbing or edema  · VASCULAR: 	Equal and normal pulses (carotid, femoral, dorsalis pedis)    ABD:  Soft, NT.  + BS  · NEUROLOGICAL:  Patient non-verbal. Opens eyes after shaking his hand and repeatedly saying his name.  Does not respond to command with .      EXT:  No edema   SKIN:  No rash    Labs:                        10.6   6.8   )-----------( 323      ( 16 Oct 2017 06:12 )             33.9     10-16    138  |  95<L>  |  106<H>  ----------------------------<  137<H>  4.6   |  20<L>  |  8.19<H>    Ca    9.2      16 Oct 2017 06:12  Phos  5.2     10-16  Mg     2.4     10-16      PT/INR - ( 16 Oct 2017 06:12 )   PT: 23.6 sec;   INR: 2.15 ratio      Interpretation of Telemetry: SR 70-90s

## 2017-10-16 NOTE — PROGRESS NOTE ADULT - ASSESSMENT
72yoM, hx of CAD (DESx18), CKDm DM2, COPD, CVA (remote, no deficits), Bipolar (not on meds). Presented with flank pain, developed NSTEMI, hypertensive emergency,  TWI. [facility] on 9/14 with BABAR to pRCA.  9/21 had cardiac arrest, tele shows bradycardia, evolving intop VF.  18 minutes CPR, intubated.   9/23 with new CVA dx, new aphasia, weakness. 9/27 EF 20%      Bradycardia mediated VF arrest.   - suggest ppm prior to dc  - daughter has not given consent for PPM  -  EP will sign off, please re-consult as needed, or when ppm desired.  [id number]

## 2017-10-16 NOTE — PROGRESS NOTE ADULT - SUBJECTIVE AND OBJECTIVE BOX
Patient is a 72y old  Male who presents with a chief complaint of s/p cardiac cath (22 Sep 2017 12:34)      SUBJECTIVE / OVERNIGHT EVENTS: No acute events overnight. Patient remains non-verbal, following some commands.    MEDICATIONS  (STANDING):  amiodarone    Tablet 200 milliGRAM(s) Oral daily  aspirin  chewable 81 milliGRAM(s) Oral daily  atorvastatin 40 milliGRAM(s) Oral at bedtime  busPIRone 5 milliGRAM(s) Oral two times a day  calcium acetate 667 milliGRAM(s) Oral three times a day with meals  clopidogrel Tablet 75 milliGRAM(s) Oral daily  dextrose 5%. 1000 milliLiter(s) (50 mL/Hr) IV Continuous <Continuous>  dextrose 50% Injectable 12.5 Gram(s) IV Push once  dextrose 50% Injectable 25 Gram(s) IV Push once  dextrose 50% Injectable 25 Gram(s) IV Push once  epoetin georgie Injectable 97439 Unit(s) IV Push every other day  insulin lispro (HumaLOG) corrective regimen sliding scale   SubCutaneous three times a day before meals  insulin lispro (HumaLOG) corrective regimen sliding scale   SubCutaneous at bedtime  metoprolol 25 milliGRAM(s) Oral two times a day  Nephro-sophie 1 Tablet(s) Oral daily  pantoprazole  Injectable 40 milliGRAM(s) IV Push daily  tamsulosin 0.4 milliGRAM(s) Oral at bedtime  warfarin 3 milliGRAM(s) Oral once    MEDICATIONS  (PRN):  dextrose Gel 1 Dose(s) Oral once PRN Blood Glucose LESS THAN 70 milliGRAM(s)/deciLiter  glucagon  Injectable 1 milliGRAM(s) IntraMuscular once PRN Glucose <70 milliGRAM(s)/deciLiter    CAPILLARY BLOOD GLUCOSE  168 (14 Oct 2017 17:32)  POCT Blood Glucose.: 226 mg/dL (14 Oct 2017 21:47)  POCT Blood Glucose.: 168 mg/dL (14 Oct 2017 17:23)  POCT Blood Glucose.: 145 mg/dL (14 Oct 2017 13:10)  POCT Blood Glucose.: 155 mg/dL (14 Oct 2017 09:18)    I&O's Summary    14 Oct 2017 07:01  -  15 Oct 2017 07:00  --------------------------------------------------------  IN: 500 mL / OUT: 0 mL / NET: 500 mL    PHYSICAL EXAM:  GENERAL: NAD, well-developed  HEAD:  Atraumatic, Normocephalic  EYES: EOMI, PERRLA, conjunctiva and sclera clear  NECK: Supple, No JVD  CHEST/LUNG: Clear to auscultation bilaterally; No wheeze  HEART: Regular rate and rhythm; No murmurs, rubs, or gallops  ABDOMEN: Soft, Nontender, Nondistended; Bowel sounds present  EXTREMITIES:  2+ Peripheral Pulses, No clubbing, cyanosis, or edema  PSYCH: awake and alert  NEUROLOGY: non-focal  SKIN: No rashes or lesions    LABS:                        10.5   7.8   )-----------( 284      ( 15 Oct 2017 06:36 )             31.6     10-15    141  |  97  |  87<H>  ----------------------------<  127<H>  4.2   |  23  |  7.08<H>    Ca    9.2      15 Oct 2017 06:36  Phos  5.4     10-15  Mg     2.3     10-15      PT/INR - ( 15 Oct 2017 06:36 )   PT: 24.9 sec;   INR: 2.25 ratio        RADIOLOGY & ADDITIONAL TESTS:    Imaging Personally Reviewed:    Consultant(s) Notes Reviewed:      Care Discussed with Consultants/Other Providers: TEAM 4 Intern: Oscar Lorenzo  Spectralink: [phone]  Pager: [phone]    HPI/INTERVAL HISTORY: No acute events overnight. Patient seen and examined at bedside. Patient alert, but not following commands with  phone. Spoke with daughter over phone and she is adamant that patient should not get pacemaker placed.  [id number] in [place].    OBJECTIVE:  VITAL SIGNS:  ICU Vital Signs Last 24 Hrs  T(C): 37 (16 Oct 2017 08:00), Max: 37.3 (15 Oct 2017 20:34)  T(F): 98.6 (16 Oct 2017 08:00), Max: 99.1 (15 Oct 2017 20:34)  HR: 72 (16 Oct 2017 08:00) (72 - 76)  BP: 132/77 (16 Oct 2017 08:00) (100/63 - 132/77)  BP(mean): --  ABP: --  ABP(mean): --  RR: 18 (16 Oct 2017 08:00) (18 - 18)  SpO2: 96% (16 Oct 2017 08:00) (96% - 98%)        10-15 @ 07:01  -  10-16 @ 07:00  --------------------------------------------------------  IN: 1100 mL / OUT: 0 mL / NET: 1100 mL      CAPILLARY BLOOD GLUCOSE  168 (14 Oct 2017 17:32)      POCT Blood Glucose.: 185 mg/dL (16 Oct 2017 13:22)      PHYSICAL EXAM:  GENERAL: NAD, well-developed  HEAD:  Atraumatic, Normocephalic  EYES: EOMI, PERRLA, conjunctiva and sclera clear  NECK: Supple, No JVD  CHEST/LUNG: Clear to auscultation bilaterally; No wheeze  HEART: Regular rate and rhythm; No murmurs, rubs, or gallops  ABDOMEN: Soft, Nontender, Nondistended; Bowel sounds present  EXTREMITIES:  2+ Peripheral Pulses, No clubbing, cyanosis, or edema  PSYCH: awake and alert, not following commands  SKIN: No rashes or lesions      LABS:                        10.6   6.8   )-----------( 323      ( 16 Oct 2017 06:12 )             33.9     10-16    138  |  95<L>  |  106<H>  ----------------------------<  137<H>  4.6   |  20<L>  |  8.19<H>    Ca    9.2      16 Oct 2017 06:12  Phos  5.2     10-16  Mg     2.4     10-16        PT/INR - ( 16 Oct 2017 06:12 )   PT: 23.6 sec;   INR: 2.15 ratio                   RADIOLOGY & ADDITIONAL TESTS: Reviewed.    amiodarone    Tablet 200 milliGRAM(s) Oral daily  aspirin  chewable 81 milliGRAM(s) Oral daily  atorvastatin 40 milliGRAM(s) Oral at bedtime  busPIRone 5 milliGRAM(s) Oral two times a day  calcium acetate 667 milliGRAM(s) Oral three times a day with meals  clopidogrel Tablet 75 milliGRAM(s) Oral daily  dextrose 5%. 1000 milliLiter(s) IV Continuous <Continuous>  dextrose 50% Injectable 12.5 Gram(s) IV Push once  dextrose 50% Injectable 25 Gram(s) IV Push once  dextrose 50% Injectable 25 Gram(s) IV Push once  dextrose Gel 1 Dose(s) Oral once PRN  epoetin georgie Injectable 06315 Unit(s) IV Push every other day  glucagon  Injectable 1 milliGRAM(s) IntraMuscular once PRN  insulin lispro (HumaLOG) corrective regimen sliding scale   SubCutaneous three times a day before meals  insulin lispro (HumaLOG) corrective regimen sliding scale   SubCutaneous at bedtime  metoprolol 25 milliGRAM(s) Oral two times a day  Nephro-sophie 1 Tablet(s) Oral daily  pantoprazole  Injectable 40 milliGRAM(s) IV Push daily  tamsulosin 0.4 milliGRAM(s) Oral at bedtime      No Known Allergies

## 2017-10-16 NOTE — PROGRESS NOTE ADULT - SUBJECTIVE AND OBJECTIVE BOX
S/p stable HD    Vital Signs Last 24 Hrs  T(C): 36.8 (10-16-17 @ 13:40), Max: 37.3 (10-15-17 @ 20:34)  T(F): 98.2 (10-16-17 @ 13:40), Max: 99.1 (10-15-17 @ 20:34)  HR: 83 (10-16-17 @ 13:40) (72 - 83)  BP: 135/72 (10-16-17 @ 13:40) (100/63 - 135/72)  BP(mean): --  RR: 18 (10-16-17 @ 13:40) (18 - 18)  SpO2: 97% (10-16-17 @ 13:40) (96% - 98%)             amiodarone    Tablet 200 milliGRAM(s) Oral daily  aspirin  chewable 81 milliGRAM(s) Oral daily  atorvastatin 40 milliGRAM(s) Oral at bedtime  busPIRone 5 milliGRAM(s) Oral two times a day  calcium acetate 667 milliGRAM(s) Oral three times a day with meals  clopidogrel Tablet 75 milliGRAM(s) Oral daily  dextrose 5%. 1000 milliLiter(s) IV Continuous <Continuous>  dextrose 50% Injectable 12.5 Gram(s) IV Push once  dextrose 50% Injectable 25 Gram(s) IV Push once  dextrose 50% Injectable 25 Gram(s) IV Push once  dextrose Gel 1 Dose(s) Oral once PRN  epoetin georgie Injectable 70970 Unit(s) IV Push every other day  glucagon  Injectable 1 milliGRAM(s) IntraMuscular once PRN  insulin lispro (HumaLOG) corrective regimen sliding scale   SubCutaneous three times a day before meals  insulin lispro (HumaLOG) corrective regimen sliding scale   SubCutaneous at bedtime  metoprolol 25 milliGRAM(s) Oral two times a day  Nephro-sophie 1 Tablet(s) Oral daily  pantoprazole  Injectable 40 milliGRAM(s) IV Push daily  tamsulosin 0.4 milliGRAM(s) Oral at bedtime    Respiratory: good air entry  Cardiovascular: S1 S2   Gastrointestinal: soft, ND, BS present  Extremities:  no edema                          10.6   6.8   )-----------( 323      ( 16 Oct 2017 06:12 )             33.9     16 Oct 2017 06:12    138    |  95     |  106    ----------------------------<  137    4.6     |  20     |  8.19     Ca    9.2        16 Oct 2017 06:12  Phos  5.2       16 Oct 2017 06:12  Mg     2.4       16 Oct 2017 06:12      Assessment and Plan:   		  CM, NSTEMI, s/p cath, PCI  S/p arrest, intubation  PAfib, s/p CVA  S/p LIN on CKD in setting of non-fx L kidney and R hydro due to stone plus hemodynamic/dye injury  Started on HD in [facility]  Subsequently passed stone and hydro resolved, but no renal fx recovery so far  On HD MWF via perm cath  Procrit w/HD  Renal diet  While most likely pt will require long term dialysis, would avoid nephrotoxins for now  D/c planning to rehab when ready  PPM prior to d/c if HCP agrees

## 2017-10-16 NOTE — PROGRESS NOTE ADULT - ASSESSMENT
73 yo M w/PMH of extensive CAD, T2DM, CKD, prior CVA transferred from OSH for NSTEMI s/p Impella-assisted LHC w/BABAR x 5 and obstructive nephropathy requiring HD transferred from CCU after PEA/Vtach arrest with ROSC c/b new embolic CVA. S/p permacath placement by IR on 10/9, family does not wish to PPM at this time, pending placement to subacute rehab.  Daughter will come to the hospital to discuss with EP team about PPM 71 yo M w/PMH of extensive CAD, T2DM, CKD, prior CVA transferred from OSH for NSTEMI s/p Impella-assisted LHC w/BABAR x 5 and obstructive nephropathy requiring HD transferred from CCU after PEA/Vtach arrest with ROSC c/b new embolic CVA. S/p permacath placement by IR on 10/9, family does not wish to PPM at this time, pending placement to subacute rehab.

## 2017-10-16 NOTE — PROGRESS NOTE ADULT - SUBJECTIVE AND OBJECTIVE BOX
24H hour events: aphasic    MEDICATIONS:  amiodarone    Tablet 200 milliGRAM(s) Oral daily  aspirin  chewable 81 milliGRAM(s) Oral daily  clopidogrel Tablet 75 milliGRAM(s) Oral daily  metoprolol 25 milliGRAM(s) Oral two times a day  tamsulosin 0.4 milliGRAM(s) Oral at bedtime        busPIRone 5 milliGRAM(s) Oral two times a day    pantoprazole  Injectable 40 milliGRAM(s) IV Push daily    atorvastatin 40 milliGRAM(s) Oral at bedtime  dextrose 50% Injectable 12.5 Gram(s) IV Push once  dextrose 50% Injectable 25 Gram(s) IV Push once  dextrose 50% Injectable 25 Gram(s) IV Push once  dextrose Gel 1 Dose(s) Oral once PRN  glucagon  Injectable 1 milliGRAM(s) IntraMuscular once PRN  insulin lispro (HumaLOG) corrective regimen sliding scale   SubCutaneous three times a day before meals  insulin lispro (HumaLOG) corrective regimen sliding scale   SubCutaneous at bedtime    calcium acetate 667 milliGRAM(s) Oral three times a day with meals  dextrose 5%. 1000 milliLiter(s) IV Continuous <Continuous>  epoetin georgie Injectable 62554 Unit(s) IV Push every other day  Nephro-sophie 1 Tablet(s) Oral daily        PHYSICAL EXAM:  T(C): 37 (10-16-17 @ 08:00), Max: 37.4 (10-15-17 @ 12:15)  HR: 72 (10-16-17 @ 08:00) (72 - 76)  BP: 132/77 (10-16-17 @ 08:00) (100/63 - 132/77)  RR: 18 (10-16-17 @ 08:00) (18 - 18)  SpO2: 96% (10-16-17 @ 08:00) (96% - 98%)  Wt(kg): --  I&O's Summary    15 Oct 2017 07:01  -  16 Oct 2017 07:00  --------------------------------------------------------  IN: 1100 mL / OUT: 0 mL / NET: 1100 mL        Appearance: Normal	  HEENT:   Normal oral mucosa  Lymphatic: No lymphadenopathy  Cardiovascular: Normal S1 S2,         No JVD,      No murmurs,      No edema  Respiratory: Lungs clear to auscultation	  Psychiatry: Mood & affect appropriate  Gastrointestinal:  Soft, Non-tender	  Skin: No rashes, No ecchymoses, No cyanosis	  Neurologic: Non-focal  Extremities: Normal range of motion, No clubbing, cyanosis   Vascular: warm extremities        LABS:	 	    CBC Full  -  ( 16 Oct 2017 06:12 )  WBC Count : 6.8 K/uL  Hemoglobin : 10.6 g/dL  Hematocrit : 33.9 %  Platelet Count - Automated : 323 K/uL  Mean Cell Volume : 91.0 fl  Mean Cell Hemoglobin : 28.3 pg  Mean Cell Hemoglobin Concentration : 31.1 gm/dL  Auto Neutrophil # : 4.3 K/uL  Auto Lymphocyte # : 1.7 K/uL  Auto Monocyte # : 0.5 K/uL  Auto Eosinophil # : 0.2 K/uL  Auto Basophil # : 0.1 K/uL  Auto Neutrophil % : 63.8 %  Auto Lymphocyte % : 24.9 %  Auto Monocyte % : 7.2 %  Auto Eosinophil % : 3.4 %  Auto Basophil % : 0.7 %    10-16    138  |  95<L>  |  106<H>  ----------------------------<  137<H>  4.6   |  20<L>  |  8.19<H>  10-15    141  |  97  |  87<H>  ----------------------------<  127<H>  4.2   |  23  |  7.08<H>    Ca    9.2      16 Oct 2017 06:12  Ca    9.2      15 Oct 2017 06:36  Phos  5.2     10-16  Phos  5.4     10-15  Mg     2.4     10-16  Mg     2.3     10-15        proBNP:     TELEMETRY: 	  s 72

## 2017-10-16 NOTE — PROGRESS NOTE ADULT - ATTENDING COMMENTS
Patient interviewed, examined and chart reviewed.  Case discussed with fellow.  Agree w/ Assessment and Plan as outlined.    Iggy Ken MD [facility]  P: [phone]  Spectra:  [phone]  Office: [phone]

## 2017-10-16 NOTE — PROGRESS NOTE ADULT - ASSESSMENT
71 yo M w/PMH of extensive CAD, prior CVA, T2DM, ESRD HD via permacath. Transferred from OSH for N-STEMI and pyelonephritis, with course c/b obstructive nephropathy S/P impella assisted LHC with DESx5.  Suffered cardiac arrest, V. Tach, w/ hospital course further c/b embolic CVA.  Now has right hemiplegia, and is non-verbal.         # NSTEMI s/p Impella-assisted Cath with BABAR x5 to LAD/RCA; complicated by subsequent VT arrest  - c/w DAPT, statin, and beta-blocker    # ICM (EF~20%)- NYHA 2-3, ACC/AHA C-currently euvolemic  - Renal failure on HD.  Nephrology advises against ACE or ARB at this time.   - c/w metoprolol at 25mg bid  - Family does not want PPM as per medicine conversation  - HF medication regimen optimization as outpatient    # Paroxysmal atrial fibrillation/flutter -currently in NSR  - cont PO amio 200mg QD  - c/w coumadin    # CVA (cerebral vascular accident) w/ CTH (9/23) demonstrating multifocal infarcts concerning for embolic strokes  - c/w coumadin  - c/w atorvastatin  - PT/OT following - JENNIFFER placement pending      RENATE Villalobos M.D.  [id number]

## 2017-10-16 NOTE — PROGRESS NOTE ADULT - PROBLEM SELECTOR PLAN 1
- patient remains in NSR  - c/w amiodarone 200 QD per EP; continue metoprolol  - c/w coumadin 3mg, goal 2-3 INR, now therapeutic/ But is placed on HOLD as per EP for possible PPM if daughter agrees.    - daily INR checks - patient remains in NSR  - c/w amiodarone 200 QD per EP; continue metoprolol  - c/w coumadin 3mg, goal 2-3 INR, now therapeutic  - daily INR checks

## 2017-10-16 NOTE — PROGRESS NOTE ADULT - PROBLEM SELECTOR PLAN 5
patient with new ESRD on HD, c/b hyperkalemia which is now resolved  - avoid nephrotoxins, renally dose meds  - c/w HD via permacath (placed 10/9) per nephrology; HD M/W/F

## 2017-10-17 LAB
ANION GAP SERPL CALC-SCNC: 19 MMOL/L — HIGH (ref 5–17)
BUN SERPL-MCNC: 64 MG/DL — HIGH (ref 7–23)
CALCIUM SERPL-MCNC: 9 MG/DL — SIGNIFICANT CHANGE UP (ref 8.4–10.5)
CHLORIDE SERPL-SCNC: 96 MMOL/L — SIGNIFICANT CHANGE UP (ref 96–108)
CO2 SERPL-SCNC: 24 MMOL/L — SIGNIFICANT CHANGE UP (ref 22–31)
CREAT SERPL-MCNC: 5.49 MG/DL — HIGH (ref 0.5–1.3)
GLUCOSE BLDC GLUCOMTR-MCNC: 137 MG/DL — HIGH (ref 70–99)
GLUCOSE BLDC GLUCOMTR-MCNC: 185 MG/DL — HIGH (ref 70–99)
GLUCOSE BLDC GLUCOMTR-MCNC: 217 MG/DL — HIGH (ref 70–99)
GLUCOSE BLDC GLUCOMTR-MCNC: 228 MG/DL — HIGH (ref 70–99)
GLUCOSE SERPL-MCNC: 135 MG/DL — HIGH (ref 70–99)
HCT VFR BLD CALC: 32.8 % — LOW (ref 39–50)
HGB BLD-MCNC: 11.1 G/DL — LOW (ref 13–17)
INR BLD: 1.73 RATIO — HIGH (ref 0.88–1.16)
MCHC RBC-ENTMCNC: 30.7 PG — SIGNIFICANT CHANGE UP (ref 27–34)
MCHC RBC-ENTMCNC: 33.8 GM/DL — SIGNIFICANT CHANGE UP (ref 32–36)
MCV RBC AUTO: 90.8 FL — SIGNIFICANT CHANGE UP (ref 80–100)
PLATELET # BLD AUTO: 274 K/UL — SIGNIFICANT CHANGE UP (ref 150–400)
POTASSIUM SERPL-MCNC: 4 MMOL/L — SIGNIFICANT CHANGE UP (ref 3.5–5.3)
POTASSIUM SERPL-SCNC: 4 MMOL/L — SIGNIFICANT CHANGE UP (ref 3.5–5.3)
PROTHROM AB SERPL-ACNC: 19.1 SEC — HIGH (ref 9.8–12.7)
RBC # BLD: 3.61 M/UL — LOW (ref 4.2–5.8)
RBC # FLD: 14.5 % — SIGNIFICANT CHANGE UP (ref 10.3–14.5)
SODIUM SERPL-SCNC: 139 MMOL/L — SIGNIFICANT CHANGE UP (ref 135–145)
WBC # BLD: 6.1 K/UL — SIGNIFICANT CHANGE UP (ref 3.8–10.5)
WBC # FLD AUTO: 6.1 K/UL — SIGNIFICANT CHANGE UP (ref 3.8–10.5)

## 2017-10-17 PROCEDURE — 99233 SBSQ HOSP IP/OBS HIGH 50: CPT

## 2017-10-17 PROCEDURE — 99233 SBSQ HOSP IP/OBS HIGH 50: CPT | Mod: GC

## 2017-10-17 RX ORDER — WARFARIN SODIUM 2.5 MG/1
3 TABLET ORAL ONCE
Qty: 0 | Refills: 0 | Status: COMPLETED | OUTPATIENT
Start: 2017-10-17 | End: 2017-10-17

## 2017-10-17 RX ADMIN — Medication 25 MILLIGRAM(S): at 05:08

## 2017-10-17 RX ADMIN — Medication 1: at 18:25

## 2017-10-17 RX ADMIN — AMIODARONE HYDROCHLORIDE 200 MILLIGRAM(S): 400 TABLET ORAL at 05:08

## 2017-10-17 RX ADMIN — Medication 667 MILLIGRAM(S): at 18:25

## 2017-10-17 RX ADMIN — Medication 5 MILLIGRAM(S): at 05:08

## 2017-10-17 RX ADMIN — CLOPIDOGREL BISULFATE 75 MILLIGRAM(S): 75 TABLET, FILM COATED ORAL at 11:37

## 2017-10-17 RX ADMIN — Medication 81 MILLIGRAM(S): at 11:38

## 2017-10-17 RX ADMIN — Medication 1 TABLET(S): at 11:39

## 2017-10-17 RX ADMIN — TAMSULOSIN HYDROCHLORIDE 0.4 MILLIGRAM(S): 0.4 CAPSULE ORAL at 21:11

## 2017-10-17 RX ADMIN — Medication 667 MILLIGRAM(S): at 11:38

## 2017-10-17 RX ADMIN — Medication 5 MILLIGRAM(S): at 18:26

## 2017-10-17 RX ADMIN — WARFARIN SODIUM 3 MILLIGRAM(S): 2.5 TABLET ORAL at 21:11

## 2017-10-17 RX ADMIN — ATORVASTATIN CALCIUM 40 MILLIGRAM(S): 80 TABLET, FILM COATED ORAL at 21:11

## 2017-10-17 RX ADMIN — Medication: at 13:30

## 2017-10-17 RX ADMIN — Medication 25 MILLIGRAM(S): at 18:24

## 2017-10-17 RX ADMIN — PANTOPRAZOLE SODIUM 40 MILLIGRAM(S): 20 TABLET, DELAYED RELEASE ORAL at 11:38

## 2017-10-17 NOTE — PROGRESS NOTE ADULT - PROBLEM SELECTOR PLAN 1
- patient remains in NSR  - c/w amiodarone 200 QD per EP; continue metoprolol  - c/w coumadin 3mg, goal 2-3 INR, now therapeutic  - daily INR checks - patient remains in NSR  - c/w amiodarone 200 QD per EP; continue metoprolol  - c/w coumadin 3mg, goal 2-3 INR  - daily INR checks

## 2017-10-17 NOTE — PROGRESS NOTE ADULT - ASSESSMENT
73 yo M w/PMH of extensive CAD, T2DM, CKD, prior CVA transferred from OSH for NSTEMI s/p Impella-assisted LHC w/BABAR x 5 and obstructive nephropathy requiring HD transferred from CCU after PEA/Vtach arrest with ROSC c/b new embolic CVA. S/p permacath placement by IR on 10/9, family does not wish to PPM at this time, pending placement to subacute rehab.

## 2017-10-17 NOTE — PROGRESS NOTE ADULT - ATTENDING COMMENTS
Patient interviewed, examined and chart reviewed.  Case discussed with fellow.  Agree w/ Assessment and Plan as outlined.    Iggy Ken MD [facility]  P: [phone]  Spectra:  [phone]  Office: [phone]

## 2017-10-17 NOTE — PROGRESS NOTE ADULT - SUBJECTIVE AND OBJECTIVE BOX
Resting, aphasic    Vital Signs Last 24 Hrs  T(C): 36.9 (10-17-17 @ 05:07), Max: 36.9 (10-17-17 @ 05:07)  T(F): 98.4 (10-17-17 @ 05:07), Max: 98.4 (10-17-17 @ 05:07)  HR: 75 (10-17-17 @ 05:07) (75 - 83)  BP: 116/76 (10-17-17 @ 05:07) (116/76 - 135/72)  BP(mean): --  RR: 19 (10-17-17 @ 05:07) (18 - 20)  SpO2: 98% (10-17-17 @ 05:07) (96% - 98%)             amiodarone    Tablet 200 milliGRAM(s) Oral daily  aspirin  chewable 81 milliGRAM(s) Oral daily  atorvastatin 40 milliGRAM(s) Oral at bedtime  busPIRone 5 milliGRAM(s) Oral two times a day  calcium acetate 667 milliGRAM(s) Oral three times a day with meals  clopidogrel Tablet 75 milliGRAM(s) Oral daily  dextrose 5%. 1000 milliLiter(s) IV Continuous <Continuous>  dextrose 50% Injectable 12.5 Gram(s) IV Push once  dextrose 50% Injectable 25 Gram(s) IV Push once  dextrose 50% Injectable 25 Gram(s) IV Push once  dextrose Gel 1 Dose(s) Oral once PRN  glucagon  Injectable 1 milliGRAM(s) IntraMuscular once PRN  insulin lispro (HumaLOG) corrective regimen sliding scale   SubCutaneous three times a day before meals  insulin lispro (HumaLOG) corrective regimen sliding scale   SubCutaneous at bedtime  metoprolol 25 milliGRAM(s) Oral two times a day  Nephro-sophie 1 Tablet(s) Oral daily  pantoprazole  Injectable 40 milliGRAM(s) IV Push daily  tamsulosin 0.4 milliGRAM(s) Oral at bedtime  warfarin 3 milliGRAM(s) Oral once    Respiratory: good air entry  Cardiovascular: S1 S2   Gastrointestinal: soft, ND, BS present  Extremities:  no edema                          11.1   6.1   )-----------( 274      ( 17 Oct 2017 05:19 )             32.8     17 Oct 2017 05:19    139    |  96     |  64     ----------------------------<  135    4.0     |  24     |  5.49     Ca    9.0        17 Oct 2017 05:19  Phos  5.2       16 Oct 2017 06:12  Mg     2.4       16 Oct 2017 06:12      Assessment and Plan:   		  CM, NSTEMI, s/p cath, PCI  S/p arrest, intubation  PAfib, s/p CVA  S/p LIN on CKD in setting of non-fx L kidney and R hydro due to stone plus hemodynamic/dye injury  [facility]  Subsequently passed stone and hydro resolved, but no renal fx recovery   On HD MWF via perm cath  Renal diet  While most likely pt will require long term dialysis, would avoid nephrotoxins for now  D/c planning to rehab when ready

## 2017-10-17 NOTE — PROGRESS NOTE ADULT - SUBJECTIVE AND OBJECTIVE BOX
HPI/INTERVAL HISTORY:  Patient seen and examined at bedside.    OBJECTIVE:  VITAL SIGNS:  ICU Vital Signs Last 24 Hrs  T(C): 36.9 (17 Oct 2017 05:07), Max: 37 (16 Oct 2017 08:00)  T(F): 98.4 (17 Oct 2017 05:07), Max: 98.6 (16 Oct 2017 08:00)  HR: 75 (17 Oct 2017 05:07) (72 - 83)  BP: 116/76 (17 Oct 2017 05:07) (116/76 - 135/72)  BP(mean): --  ABP: --  ABP(mean): --  RR: 19 (17 Oct 2017 05:07) (18 - 20)  SpO2: 98% (17 Oct 2017 05:07) (96% - 98%)        10-15 @ 07:01  -  10-16 @ 07:00  --------------------------------------------------------  IN: 1100 mL / OUT: 0 mL / NET: 1100 mL    10-16 @ 07:01  -  10-17 @ 06:55  --------------------------------------------------------  IN: 360 mL / OUT: 1000 mL / NET: -640 mL      CAPILLARY BLOOD GLUCOSE      POCT Blood Glucose.: 169 mg/dL (16 Oct 2017 20:17)      PHYSICAL EXAM:  Gen:   HEENT: NC/AT; PERRL, anicteric sclera  Neck: supple, no JVD  Resp: clear to ausculation B/L; no wheezes, rales or rhonchi  Cardiovasc: S1S2 normal; RRR; no murmurs, rubs or gallops  GI: soft, nondistended, nontender; +BS  Extr: warm, well-perfused, PT/DP pulses 2+ B/L; no LE edema  Skin: normal color and turgor  Neuro:     LABS:                        11.1   6.1   )-----------( 274      ( 17 Oct 2017 05:19 )             32.8     10-17    139  |  96  |  x   ----------------------------<  x   4.0   |  x   |  x     Ca    9.2      16 Oct 2017 06:12  Phos  5.2     10-16  Mg     2.4     10-16        PT/INR - ( 17 Oct 2017 05:19 )   PT: 19.1 sec;   INR: 1.73 ratio                   RADIOLOGY & ADDITIONAL TESTS: Reviewed.    amiodarone    Tablet 200 milliGRAM(s) Oral daily  aspirin  chewable 81 milliGRAM(s) Oral daily  atorvastatin 40 milliGRAM(s) Oral at bedtime  busPIRone 5 milliGRAM(s) Oral two times a day  calcium acetate 667 milliGRAM(s) Oral three times a day with meals  clopidogrel Tablet 75 milliGRAM(s) Oral daily  dextrose 5%. 1000 milliLiter(s) IV Continuous <Continuous>  dextrose 50% Injectable 12.5 Gram(s) IV Push once  dextrose 50% Injectable 25 Gram(s) IV Push once  dextrose 50% Injectable 25 Gram(s) IV Push once  dextrose Gel 1 Dose(s) Oral once PRN  epoetin georgie Injectable 31347 Unit(s) IV Push every other day  glucagon  Injectable 1 milliGRAM(s) IntraMuscular once PRN  insulin lispro (HumaLOG) corrective regimen sliding scale   SubCutaneous three times a day before meals  insulin lispro (HumaLOG) corrective regimen sliding scale   SubCutaneous at bedtime  metoprolol 25 milliGRAM(s) Oral two times a day  Nephro-sophie 1 Tablet(s) Oral daily  pantoprazole  Injectable 40 milliGRAM(s) IV Push daily  tamsulosin 0.4 milliGRAM(s) Oral at bedtime      No Known Allergies HPI/INTERVAL HISTORY:  Patient seen and examined at bedside. Patient nonverbal at baseline. Attempted to speak with patient with  phone (ID #:[id number]), however patient not following commands but appears alert. In no apparent discomfort.    OBJECTIVE:  VITAL SIGNS:  ICU Vital Signs Last 24 Hrs  T(C): 36.9 (17 Oct 2017 05:07), Max: 37 (16 Oct 2017 08:00)  T(F): 98.4 (17 Oct 2017 05:07), Max: 98.6 (16 Oct 2017 08:00)  HR: 75 (17 Oct 2017 05:07) (72 - 83)  BP: 116/76 (17 Oct 2017 05:07) (116/76 - 135/72)  BP(mean): --  ABP: --  ABP(mean): --  RR: 19 (17 Oct 2017 05:07) (18 - 20)  SpO2: 98% (17 Oct 2017 05:07) (96% - 98%)        10-15 @ 07:01  -  10-16 @ 07:00  --------------------------------------------------------  IN: 1100 mL / OUT: 0 mL / NET: 1100 mL    10-16 @ 07:01  -  10-17 @ 06:55  --------------------------------------------------------  IN: 360 mL / OUT: 1000 mL / NET: -640 mL      CAPILLARY BLOOD GLUCOSE      POCT Blood Glucose.: 169 mg/dL (16 Oct 2017 20:17)      PHYSICAL EXAM:  GENERAL: NAD, well-developed  HEAD:  Atraumatic, Normocephalic  EYES: EOMI, PERRLA, conjunctiva and sclera clear  NECK: Supple, No JVD  CHEST/LUNG: Clear to auscultation bilaterally; No wheeze  HEART: Regular rate and rhythm; No murmurs, rubs, or gallops  ABDOMEN: Soft, Nontender, Nondistended; Bowel sounds present  EXTREMITIES:  2+ Peripheral Pulses, No clubbing, cyanosis, or edema  PSYCH: awake and alert, not following commands  SKIN: No rashes or lesions      LABS:                        11.1   6.1   )-----------( 274      ( 17 Oct 2017 05:19 )             32.8     10-17    139  |  96  |  x   ----------------------------<  x   4.0   |  x   |  x     Ca    9.2      16 Oct 2017 06:12  Phos  5.2     10-16  Mg     2.4     10-16        PT/INR - ( 17 Oct 2017 05:19 )   PT: 19.1 sec;   INR: 1.73 ratio                   RADIOLOGY & ADDITIONAL TESTS: Reviewed.    amiodarone    Tablet 200 milliGRAM(s) Oral daily  aspirin  chewable 81 milliGRAM(s) Oral daily  atorvastatin 40 milliGRAM(s) Oral at bedtime  busPIRone 5 milliGRAM(s) Oral two times a day  calcium acetate 667 milliGRAM(s) Oral three times a day with meals  clopidogrel Tablet 75 milliGRAM(s) Oral daily  dextrose 5%. 1000 milliLiter(s) IV Continuous <Continuous>  dextrose 50% Injectable 12.5 Gram(s) IV Push once  dextrose 50% Injectable 25 Gram(s) IV Push once  dextrose 50% Injectable 25 Gram(s) IV Push once  dextrose Gel 1 Dose(s) Oral once PRN  epoetin georgie Injectable 82932 Unit(s) IV Push every other day  glucagon  Injectable 1 milliGRAM(s) IntraMuscular once PRN  insulin lispro (HumaLOG) corrective regimen sliding scale   SubCutaneous three times a day before meals  insulin lispro (HumaLOG) corrective regimen sliding scale   SubCutaneous at bedtime  metoprolol 25 milliGRAM(s) Oral two times a day  Nephro-sophie 1 Tablet(s) Oral daily  pantoprazole  Injectable 40 milliGRAM(s) IV Push daily  tamsulosin 0.4 milliGRAM(s) Oral at bedtime      No Known Allergies TEAM 4 Medicine Intern: Oscar Lorenzo  Spectralink: [id number]  Pager: [phone]    HPI/INTERVAL HISTORY:  Patient seen and examined at bedside. Patient nonverbal at baseline. Attempted to speak with patient with  phone (ID #:[id number]), however patient not following commands but appears alert. In no apparent discomfort.    OBJECTIVE:  VITAL SIGNS:  ICU Vital Signs Last 24 Hrs  T(C): 36.9 (17 Oct 2017 05:07), Max: 37 (16 Oct 2017 08:00)  T(F): 98.4 (17 Oct 2017 05:07), Max: 98.6 (16 Oct 2017 08:00)  HR: 75 (17 Oct 2017 05:07) (72 - 83)  BP: 116/76 (17 Oct 2017 05:07) (116/76 - 135/72)  BP(mean): --  ABP: --  ABP(mean): --  RR: 19 (17 Oct 2017 05:07) (18 - 20)  SpO2: 98% (17 Oct 2017 05:07) (96% - 98%)        10-15 @ 07:01  -  10-16 @ 07:00  --------------------------------------------------------  IN: 1100 mL / OUT: 0 mL / NET: 1100 mL    10-16 @ 07:01  -  10-17 @ 06:55  --------------------------------------------------------  IN: 360 mL / OUT: 1000 mL / NET: -640 mL      CAPILLARY BLOOD GLUCOSE      POCT Blood Glucose.: 169 mg/dL (16 Oct 2017 20:17)      PHYSICAL EXAM:  GENERAL: NAD, well-developed  HEAD:  Atraumatic, Normocephalic  EYES: EOMI, PERRLA, conjunctiva and sclera clear  NECK: Supple, No JVD  CHEST/LUNG: Clear to auscultation bilaterally; No wheeze  HEART: Regular rate and rhythm; No murmurs, rubs, or gallops  ABDOMEN: Soft, Nontender, Nondistended; Bowel sounds present  EXTREMITIES:  2+ Peripheral Pulses, No clubbing, cyanosis, or edema  PSYCH: awake and alert, not following commands  SKIN: No rashes or lesions      LABS:                        11.1   6.1   )-----------( 274      ( 17 Oct 2017 05:19 )             32.8     10-17    139  |  96  |  x   ----------------------------<  x   4.0   |  x   |  x     Ca    9.2      16 Oct 2017 06:12  Phos  5.2     10-16  Mg     2.4     10-16        PT/INR - ( 17 Oct 2017 05:19 )   PT: 19.1 sec;   INR: 1.73 ratio                   RADIOLOGY & ADDITIONAL TESTS: Reviewed.    amiodarone    Tablet 200 milliGRAM(s) Oral daily  aspirin  chewable 81 milliGRAM(s) Oral daily  atorvastatin 40 milliGRAM(s) Oral at bedtime  busPIRone 5 milliGRAM(s) Oral two times a day  calcium acetate 667 milliGRAM(s) Oral three times a day with meals  clopidogrel Tablet 75 milliGRAM(s) Oral daily  dextrose 5%. 1000 milliLiter(s) IV Continuous <Continuous>  dextrose 50% Injectable 12.5 Gram(s) IV Push once  dextrose 50% Injectable 25 Gram(s) IV Push once  dextrose 50% Injectable 25 Gram(s) IV Push once  dextrose Gel 1 Dose(s) Oral once PRN  epoetin georgie Injectable 66722 Unit(s) IV Push every other day  glucagon  Injectable 1 milliGRAM(s) IntraMuscular once PRN  insulin lispro (HumaLOG) corrective regimen sliding scale   SubCutaneous three times a day before meals  insulin lispro (HumaLOG) corrective regimen sliding scale   SubCutaneous at bedtime  metoprolol 25 milliGRAM(s) Oral two times a day  Nephro-sophie 1 Tablet(s) Oral daily  pantoprazole  Injectable 40 milliGRAM(s) IV Push daily  tamsulosin 0.4 milliGRAM(s) Oral at bedtime      No Known Allergies

## 2017-10-17 NOTE — PROGRESS NOTE ADULT - SUBJECTIVE AND OBJECTIVE BOX
Date of Admission: 9/12/17    24H hour events: No acute events overnight.     MEDICATIONS:  amiodarone    Tablet 200 milliGRAM(s) Oral daily  aspirin  chewable 81 milliGRAM(s) Oral daily  clopidogrel Tablet 75 milliGRAM(s) Oral daily  metoprolol 25 milliGRAM(s) Oral two times a day  tamsulosin 0.4 milliGRAM(s) Oral at bedtime  warfarin 3 milliGRAM(s) Oral once    busPIRone 5 milliGRAM(s) Oral two times a day    pantoprazole  Injectable 40 milliGRAM(s) IV Push daily    atorvastatin 40 milliGRAM(s) Oral at bedtime  dextrose 50% Injectable 12.5 Gram(s) IV Push once  dextrose 50% Injectable 25 Gram(s) IV Push once  dextrose 50% Injectable 25 Gram(s) IV Push once  dextrose Gel 1 Dose(s) Oral once PRN  glucagon  Injectable 1 milliGRAM(s) IntraMuscular once PRN  insulin lispro (HumaLOG) corrective regimen sliding scale   SubCutaneous three times a day before meals  insulin lispro (HumaLOG) corrective regimen sliding scale   SubCutaneous at bedtime    calcium acetate 667 milliGRAM(s) Oral three times a day with meals  dextrose 5%. 1000 milliLiter(s) IV Continuous <Continuous>  epoetin georgie Injectable 55936 Unit(s) IV Push every other day  Nephro-sophie 1 Tablet(s) Oral daily      REVIEW OF SYSTEMS:  Complete 10point ROS negative.    PHYSICAL EXAM:  T(C): 36.9 (10-17-17 @ 05:07), Max: 37 (10-16-17 @ 08:00)  HR: 75 (10-17-17 @ 05:07) (72 - 83)  BP: 116/76 (10-17-17 @ 05:07) (116/76 - 135/72)  RR: 19 (10-17-17 @ 05:07) (18 - 20)  SpO2: 98% (10-17-17 @ 05:07) (96% - 98%)  Wt(kg): --  I&O's Summary    16 Oct 2017 07:01  -  17 Oct 2017 07:00  --------------------------------------------------------  IN: 360 mL / OUT: 1000 mL / NET: -640 mL        General::  Nonverbal.  Lies in bed.  Limited response to commands.  Lethargic. Restricted facial expression  Neck:	No bruits; no JVD  Lung: Minimal breath sounds.  CTA bilaterally.  No rhonchi, wheezes, rales appreciated   CV: PMI not displaced.  Regular rate and rhythm.  S1/S2 normal.  No rub, murmur or gallop.  · EXTREMITIES: No cyanosis, clubbing or edema  Vasc: Equal and normal pulses (carotid, femoral, dorsalis pedis)  Abd:  Soft, NT.  + BS  Neuro: Patient non-verbal. Opens his eyes to voice and physical stimuli  Ext:  No edema  Skin: No rash          LABS:	 	    CBC Full  -  ( 17 Oct 2017 05:19 )  WBC Count : 6.1 K/uL  Hemoglobin : 11.1 g/dL  Hematocrit : 32.8 %  Platelet Count - Automated : 274 K/uL  Mean Cell Volume : 90.8 fl  Mean Cell Hemoglobin : 30.7 pg  Mean Cell Hemoglobin Concentration : 33.8 gm/dL      10-17    139  |  96  |  64<H>  ----------------------------<  135<H>  4.0   |  24  |  5.49<H>  10-16    138  |  95<L>  |  106<H>  ----------------------------<  137<H>  4.6   |  20<L>  |  8.19<H>    Ca    9.0      17 Oct 2017 05:19  Ca    9.2      16 Oct 2017 06:12  Phos  5.2     10-16  Mg     2.4     10-16    TELEMETRY: 	  NSR 70-90s 	    ASSESSMENT/PLAN: 	    73 yo M w/PMH of extensive CAD, prior CVA, T2DM, ESRD HD via permacath. Transferred from OSH for N-STEMI and pyelonephritis, with course c/b obstructive nephropathy S/P impella assisted LHC with DESx5.  Suffered cardiac arrest, V. Tach, w/ hospital course further c/b embolic CVA with right hemiplegia, and is non-verbal.         # NSTEMI s/p Impella-assisted Cath with BABAR x5 to LAD/RCA; complicated by subsequent VT arrest  - c/w DAPT, statin, and BB    # ICM (EF~20%)- NYHA 2-3, ACC/AHA C;Euvolemic  - Renal failure on HD.  Nephrology advises against ACE or ARB at this time.   - c/w metoprolol at 25mg bid  - Family is currently not interested in PPM at this time.  - HF medication regimen optimization as OP    # Paroxysmal atrial fibrillation/flutter -currently in NSR  - cont PO amio 200mg QD  - c/w coumadin, Monitor INR    # CVA (cerebral vascular accident) w/ CTH (9/23) demonstrating multifocal infarcts concerning for embolic strokes  - c/w coumadin  - c/w atorvastatin  - PT/OT following - JENNIFFER placement pending    [id number] Date of Admission: 9/12/17    24H hour events: No acute events overnight.     MEDICATIONS:  amiodarone    Tablet 200 milliGRAM(s) Oral daily  aspirin  chewable 81 milliGRAM(s) Oral daily  clopidogrel Tablet 75 milliGRAM(s) Oral daily  metoprolol 25 milliGRAM(s) Oral two times a day  tamsulosin 0.4 milliGRAM(s) Oral at bedtime  warfarin 3 milliGRAM(s) Oral once    busPIRone 5 milliGRAM(s) Oral two times a day    pantoprazole  Injectable 40 milliGRAM(s) IV Push daily    atorvastatin 40 milliGRAM(s) Oral at bedtime  dextrose 50% Injectable 12.5 Gram(s) IV Push once  dextrose 50% Injectable 25 Gram(s) IV Push once  dextrose 50% Injectable 25 Gram(s) IV Push once  dextrose Gel 1 Dose(s) Oral once PRN  glucagon  Injectable 1 milliGRAM(s) IntraMuscular once PRN  insulin lispro (HumaLOG) corrective regimen sliding scale   SubCutaneous three times a day before meals  insulin lispro (HumaLOG) corrective regimen sliding scale   SubCutaneous at bedtime    calcium acetate 667 milliGRAM(s) Oral three times a day with meals  dextrose 5%. 1000 milliLiter(s) IV Continuous <Continuous>  epoetin georgie Injectable 83529 Unit(s) IV Push every other day  Nephro-sophie 1 Tablet(s) Oral daily      REVIEW OF SYSTEMS:  Complete 10point ROS negative.    PHYSICAL EXAM:  T(C): 36.9 (10-17-17 @ 05:07), Max: 37 (10-16-17 @ 08:00)  HR: 75 (10-17-17 @ 05:07) (72 - 83)  BP: 116/76 (10-17-17 @ 05:07) (116/76 - 135/72)  RR: 19 (10-17-17 @ 05:07) (18 - 20)  SpO2: 98% (10-17-17 @ 05:07) (96% - 98%)  Wt(kg): --  I&O's Summary    16 Oct 2017 07:01  -  17 Oct 2017 07:00  --------------------------------------------------------  IN: 360 mL / OUT: 1000 mL / NET: -640 mL        General::  Nonverbal. Sitting in  a chair at bedside, no distress  Limited response to commands.  Lethargic. Restricted facial expression  Neck:	No bruits; no JVD  Lung: Minimal breath sounds.  CTA bilaterally.  No rhonchi, wheezes, rales appreciated   CV: PMI not displaced.  Regular rate and rhythm.  S1/S2 normal.  No rub, murmur or gallop.  · EXTREMITIES: No cyanosis, clubbing or edema  Vasc: Equal and normal pulses (carotid, femoral, dorsalis pedis)  Abd:  Soft, NT.  + BS  Neuro: Patient non-verbal. Opens his eyes to voice and physical stimuli  Ext:  No edema  Skin: No rash          LABS:	 	    CBC Full  -  ( 17 Oct 2017 05:19 )  WBC Count : 6.1 K/uL  Hemoglobin : 11.1 g/dL  Hematocrit : 32.8 %  Platelet Count - Automated : 274 K/uL  Mean Cell Volume : 90.8 fl  Mean Cell Hemoglobin : 30.7 pg  Mean Cell Hemoglobin Concentration : 33.8 gm/dL      10-17    139  |  96  |  64<H>  ----------------------------<  135<H>  4.0   |  24  |  5.49<H>  10-16    138  |  95<L>  |  106<H>  ----------------------------<  137<H>  4.6   |  20<L>  |  8.19<H>    Ca    9.0      17 Oct 2017 05:19  Ca    9.2      16 Oct 2017 06:12  Phos  5.2     10-16  Mg     2.4     10-16    TELEMETRY: 	  NSR 70-90s 	    ASSESSMENT/PLAN: 	    71 yo M w/PMH of extensive CAD, prior CVA, T2DM, ESRD HD via permacath. Transferred from OSH for N-STEMI and pyelonephritis, with course c/b obstructive nephropathy S/P impella assisted LHC with DESx5.  Suffered cardiac arrest, V. Tach, w/ hospital course further c/b embolic CVA with right hemiplegia, and is non-verbal.         # NSTEMI s/p Impella-assisted Cath with BABAR x5 to LAD/RCA; complicated by subsequent VT arrest  - c/w DAPT, statin, and BB    # ICM (EF~20%)- NYHA 2-3, ACC/AHA C;Euvolemic  - Renal failure on HD.  Nephrology advises against ACE or ARB at this time.   - c/w metoprolol at 25mg bid  - Family is currently not interested in PPM at this time.  - HF medication regimen optimization as OP    # Paroxysmal atrial fibrillation/flutter -currently in NSR  - cont PO amio 200mg QD  - c/w coumadin, Monitor INR    # CVA (cerebral vascular accident) w/ CTH (9/23) demonstrating multifocal infarcts concerning for embolic strokes  - c/w coumadin  - c/w atorvastatin  - PT/OT following - JENNIFFER placement pending    [id number]

## 2017-10-17 NOTE — PROGRESS NOTE ADULT - PROBLEM SELECTOR PLAN 10
- DVT PPx: INR therapeutic on coumadin  - patient is DNR/DNI - DVT PPx: coumadin  - patient is DNR/DNI

## 2017-10-17 NOTE — PROGRESS NOTE ADULT - PROBLEM SELECTOR PLAN 3
Patient with worsening systolic dysfunction s/p NSTEMI/arrest  - daughter declining PPM at this time, would like to wait  - as per EP, reported wanting to wait closer to d/c. will cont to discuss with her   - c/w fluid removal by intermittent HD  - c/w metoprolol 25mg BID  - cardio following Patient with worsening systolic dysfunction s/p NSTEMI/arrest  - daughter declining PPM at this time,   - c/w fluid removal by intermittent HD  - c/w metoprolol 25mg BID  - cardio following

## 2017-10-18 VITALS
DIASTOLIC BLOOD PRESSURE: 66 MMHG | HEART RATE: 88 BPM | OXYGEN SATURATION: 98 % | SYSTOLIC BLOOD PRESSURE: 103 MMHG | TEMPERATURE: 99 F | RESPIRATION RATE: 17 BRPM

## 2017-10-18 LAB
ANION GAP SERPL CALC-SCNC: 23 MMOL/L — HIGH (ref 5–17)
APTT BLD: 34.9 SEC — SIGNIFICANT CHANGE UP (ref 27.5–37.4)
BUN SERPL-MCNC: 97 MG/DL — HIGH (ref 7–23)
CALCIUM SERPL-MCNC: 9.1 MG/DL — SIGNIFICANT CHANGE UP (ref 8.4–10.5)
CHLORIDE SERPL-SCNC: 93 MMOL/L — LOW (ref 96–108)
CO2 SERPL-SCNC: 21 MMOL/L — LOW (ref 22–31)
CREAT SERPL-MCNC: 6.97 MG/DL — HIGH (ref 0.5–1.3)
GLUCOSE BLDC GLUCOMTR-MCNC: 145 MG/DL — HIGH (ref 70–99)
GLUCOSE BLDC GLUCOMTR-MCNC: 192 MG/DL — HIGH (ref 70–99)
GLUCOSE SERPL-MCNC: 134 MG/DL — HIGH (ref 70–99)
HCT VFR BLD CALC: 32.4 % — LOW (ref 39–50)
HGB BLD-MCNC: 11 G/DL — LOW (ref 13–17)
INR BLD: 1.83 RATIO — HIGH (ref 0.88–1.16)
MAGNESIUM SERPL-MCNC: 2.1 MG/DL — SIGNIFICANT CHANGE UP (ref 1.6–2.6)
MCHC RBC-ENTMCNC: 30.7 PG — SIGNIFICANT CHANGE UP (ref 27–34)
MCHC RBC-ENTMCNC: 33.9 GM/DL — SIGNIFICANT CHANGE UP (ref 32–36)
MCV RBC AUTO: 90.5 FL — SIGNIFICANT CHANGE UP (ref 80–100)
PHOSPHATE SERPL-MCNC: 5.2 MG/DL — HIGH (ref 2.5–4.5)
PLATELET # BLD AUTO: 268 K/UL — SIGNIFICANT CHANGE UP (ref 150–400)
POTASSIUM SERPL-MCNC: 4.5 MMOL/L — SIGNIFICANT CHANGE UP (ref 3.5–5.3)
POTASSIUM SERPL-SCNC: 4.5 MMOL/L — SIGNIFICANT CHANGE UP (ref 3.5–5.3)
PROTHROM AB SERPL-ACNC: 20 SEC — HIGH (ref 9.8–12.7)
RBC # BLD: 3.59 M/UL — LOW (ref 4.2–5.8)
RBC # FLD: 14.2 % — SIGNIFICANT CHANGE UP (ref 10.3–14.5)
SODIUM SERPL-SCNC: 137 MMOL/L — SIGNIFICANT CHANGE UP (ref 135–145)
WBC # BLD: 6.9 K/UL — SIGNIFICANT CHANGE UP (ref 3.8–10.5)
WBC # FLD AUTO: 6.9 K/UL — SIGNIFICANT CHANGE UP (ref 3.8–10.5)

## 2017-10-18 PROCEDURE — 99239 HOSP IP/OBS DSCHRG MGMT >30: CPT

## 2017-10-18 RX ORDER — AMIODARONE HYDROCHLORIDE 400 MG/1
1 TABLET ORAL
Qty: 0 | Refills: 0 | COMMUNITY
Start: 2017-10-18

## 2017-10-18 RX ORDER — PANTOPRAZOLE SODIUM 20 MG/1
40 TABLET, DELAYED RELEASE ORAL
Qty: 0 | Refills: 0 | COMMUNITY

## 2017-10-18 RX ORDER — BUDESONIDE AND FORMOTEROL FUMARATE DIHYDRATE 160; 4.5 UG/1; UG/1
2 AEROSOL RESPIRATORY (INHALATION)
Qty: 0 | Refills: 0 | COMMUNITY

## 2017-10-18 RX ORDER — ATORVASTATIN CALCIUM 80 MG/1
1 TABLET, FILM COATED ORAL
Qty: 0 | Refills: 0 | COMMUNITY

## 2017-10-18 RX ORDER — GLIMEPIRIDE 1 MG
1 TABLET ORAL
Qty: 0 | Refills: 0 | COMMUNITY

## 2017-10-18 RX ORDER — PANTOPRAZOLE SODIUM 20 MG/1
40 TABLET, DELAYED RELEASE ORAL
Qty: 0 | Refills: 0 | COMMUNITY
Start: 2017-10-18

## 2017-10-18 RX ORDER — SEVELAMER CARBONATE 2400 MG/1
2 POWDER, FOR SUSPENSION ORAL
Qty: 0 | Refills: 0 | COMMUNITY

## 2017-10-18 RX ORDER — METOPROLOL TARTRATE 50 MG
1 TABLET ORAL
Qty: 0 | Refills: 0 | COMMUNITY

## 2017-10-18 RX ORDER — ATORVASTATIN CALCIUM 80 MG/1
1 TABLET, FILM COATED ORAL
Qty: 0 | Refills: 0 | COMMUNITY
Start: 2017-10-18

## 2017-10-18 RX ORDER — CEFTRIAXONE 500 MG/1
1 INJECTION, POWDER, FOR SOLUTION INTRAMUSCULAR; INTRAVENOUS
Qty: 0 | Refills: 0 | COMMUNITY

## 2017-10-18 RX ORDER — SITAGLIPTIN AND METFORMIN HYDROCHLORIDE 500; 50 MG/1; MG/1
1 TABLET, FILM COATED ORAL
Qty: 0 | Refills: 0 | COMMUNITY

## 2017-10-18 RX ORDER — METOPROLOL TARTRATE 50 MG
1 TABLET ORAL
Qty: 0 | Refills: 0 | COMMUNITY
Start: 2017-10-18

## 2017-10-18 RX ORDER — WARFARIN SODIUM 2.5 MG/1
3 TABLET ORAL ONCE
Qty: 0 | Refills: 0 | Status: DISCONTINUED | OUTPATIENT
Start: 2017-10-18 | End: 2017-10-18

## 2017-10-18 RX ORDER — ISOSORBIDE MONONITRATE 60 MG/1
1 TABLET, EXTENDED RELEASE ORAL
Qty: 0 | Refills: 0 | COMMUNITY

## 2017-10-18 RX ORDER — CLOPIDOGREL BISULFATE 75 MG/1
1 TABLET, FILM COATED ORAL
Qty: 0 | Refills: 0 | COMMUNITY
Start: 2017-10-18

## 2017-10-18 RX ORDER — CLOPIDOGREL BISULFATE 75 MG/1
1 TABLET, FILM COATED ORAL
Qty: 0 | Refills: 0 | COMMUNITY

## 2017-10-18 RX ORDER — RANOLAZINE 500 MG/1
1 TABLET, FILM COATED, EXTENDED RELEASE ORAL
Qty: 0 | Refills: 0 | COMMUNITY

## 2017-10-18 RX ORDER — AZILSARTAN KAMEDOXOMIL 40 MG/1
1 TABLET ORAL
Qty: 0 | Refills: 0 | COMMUNITY

## 2017-10-18 RX ORDER — HEPARIN SODIUM 5000 [USP'U]/ML
0 INJECTION INTRAVENOUS; SUBCUTANEOUS
Qty: 0 | Refills: 0 | COMMUNITY

## 2017-10-18 RX ORDER — NITROGLYCERIN 6.5 MG
1 CAPSULE, EXTENDED RELEASE ORAL
Qty: 0 | Refills: 0 | COMMUNITY

## 2017-10-18 RX ORDER — SIMVASTATIN 20 MG/1
1 TABLET, FILM COATED ORAL
Qty: 0 | Refills: 0 | COMMUNITY

## 2017-10-18 RX ORDER — NIFEDIPINE 30 MG
1 TABLET, EXTENDED RELEASE 24 HR ORAL
Qty: 0 | Refills: 0 | COMMUNITY

## 2017-10-18 RX ADMIN — Medication 5 MILLIGRAM(S): at 05:08

## 2017-10-18 RX ADMIN — Medication 1: at 13:18

## 2017-10-18 RX ADMIN — PANTOPRAZOLE SODIUM 40 MILLIGRAM(S): 20 TABLET, DELAYED RELEASE ORAL at 12:46

## 2017-10-18 RX ADMIN — CLOPIDOGREL BISULFATE 75 MILLIGRAM(S): 75 TABLET, FILM COATED ORAL at 12:25

## 2017-10-18 RX ADMIN — Medication 25 MILLIGRAM(S): at 05:08

## 2017-10-18 RX ADMIN — AMIODARONE HYDROCHLORIDE 200 MILLIGRAM(S): 400 TABLET ORAL at 05:08

## 2017-10-18 RX ADMIN — Medication 1 TABLET(S): at 12:26

## 2017-10-18 RX ADMIN — Medication 81 MILLIGRAM(S): at 12:24

## 2017-10-18 RX ADMIN — Medication 667 MILLIGRAM(S): at 12:25

## 2017-10-18 NOTE — PROGRESS NOTE ADULT - ASSESSMENT
71 yo M w/PMH of extensive CAD, T2DM, CKD, prior CVA transferred from OSH for NSTEMI s/p Impella-assisted LHC w/BABAR x 5 and obstructive nephropathy requiring HD transferred from CCU after PEA/Vtach arrest with ROSC c/b new embolic CVA. S/p permacath placement by IR on 10/9, family does not wish to PPM at this time, pending placement to subacute rehab.

## 2017-10-18 NOTE — PROGRESS NOTE ADULT - SUBJECTIVE AND OBJECTIVE BOX
Seen on HD, aphasic    Vital Signs Last 24 Hrs  T(C): 36.8 (10-18-17 @ 08:30), Max: 37.1 (10-18-17 @ 04:48)  T(F): 98.3 (10-18-17 @ 08:30), Max: 98.7 (10-18-17 @ 04:48)  HR: 71 (10-18-17 @ 08:30) (71 - 83)  BP: 121/67 (10-18-17 @ 08:30) (102/66 - 121/67)  BP(mean): --  RR: 18 (10-18-17 @ 08:30) (18 - 19)  SpO2: 98% (10-18-17 @ 08:30) (98% - 98%)             amiodarone    Tablet 200 milliGRAM(s) Oral daily  aspirin  chewable 81 milliGRAM(s) Oral daily  atorvastatin 40 milliGRAM(s) Oral at bedtime  busPIRone 5 milliGRAM(s) Oral two times a day  calcium acetate 667 milliGRAM(s) Oral three times a day with meals  clopidogrel Tablet 75 milliGRAM(s) Oral daily  dextrose 5%. 1000 milliLiter(s) IV Continuous <Continuous>  dextrose 50% Injectable 12.5 Gram(s) IV Push once  dextrose 50% Injectable 25 Gram(s) IV Push once  dextrose 50% Injectable 25 Gram(s) IV Push once  dextrose Gel 1 Dose(s) Oral once PRN  glucagon  Injectable 1 milliGRAM(s) IntraMuscular once PRN  insulin lispro (HumaLOG) corrective regimen sliding scale   SubCutaneous three times a day before meals  insulin lispro (HumaLOG) corrective regimen sliding scale   SubCutaneous at bedtime  metoprolol 25 milliGRAM(s) Oral two times a day  Nephro-sophie 1 Tablet(s) Oral daily  pantoprazole  Injectable 40 milliGRAM(s) IV Push daily  tamsulosin 0.4 milliGRAM(s) Oral at bedtime    Respiratory: good air entry  Cardiovascular: S1 S2   Gastrointestinal: soft, ND, BS present  Extremities:  no edema                          11.0   6.9   )-----------( 268      ( 18 Oct 2017 05:12 )             32.4     18 Oct 2017 05:12    137    |  93     |  97     ----------------------------<  134    4.5     |  21     |  6.97     Ca    9.1        18 Oct 2017 05:12  Phos  5.2       18 Oct 2017 05:12  Mg     2.1       18 Oct 2017 05:12        Assessment and Plan:   		  CM, NSTEMI, s/p cath, PCI  S/p arrest, intubation  PAfib, s/p CVA  S/p LIN on CKD in setting of non-fx L kidney and R hydro due to stone plus hemodynamic/dye injury  Started on HD in [facility]  Subsequently passed stone and hydro resolved, but no renal fx recovery   On HD MWF via perm cath  Renal diet  D/c planning to rehab

## 2017-10-18 NOTE — PROGRESS NOTE ADULT - PROBLEM SELECTOR PLAN 1
- patient remains in NSR  - c/w amiodarone 200 QD per EP; continue metoprolol  - c/w coumadin 3mg, goal 2-3 INR  - daily INR checks

## 2017-10-18 NOTE — PROGRESS NOTE ADULT - SUBJECTIVE AND OBJECTIVE BOX
No events reported today .  NO fever or diarrhea   Patient received HD today             OBJECTIVE:  VITAL SIGNS:    T(C): 98.7F   HR: 88  BP: 103/66  RR: 17  SpO2: 98% RA     PHYSICAL EXAM:  GENERAL: NAD, well-developed  HEAD:  Atraumatic, Normocephalic  EYES: conjunctiva and sclera clear  NECK: Supple, No JVD  CHEST/LUNG: Clear to auscultation bilaterally; No wheeze  HEART: Regular rate and rhythm; No murmurs, rubs, or gallops  ABDOMEN: Soft, Nontender, Nondistended; Bowel sounds present  EXTREMITIES:  2+ Peripheral Pulses, No clubbing, cyanosis, or edema  PSYCH: awake and alert, not following commands  SKIN: No rashes or lesions                 11.0   6.9   )-----------( 268 ( 18Oct 2017)             32.4        137  |  93  |  97  ----------------------------<  134  4.5   |  21  |  6.97          PT/INR - : ( 10/18)   INR: 1.83

## 2017-10-18 NOTE — PROGRESS NOTE ADULT - PROVIDER SPECIALTY LIST ADULT
CCU
Cardiology
Electrophysiology
Infectious Disease
Internal Medicine
Intervent Radiology
Nephrology
Neurology
Neurology
Palliative Care
Urology
Cardiology
Cardiology
Intervent Radiology
Nephrology
Cardiology
Cardiology
Electrophysiology
Electrophysiology
CCU
Internal Medicine

## 2017-10-18 NOTE — PROGRESS NOTE ADULT - I WAS PHYSICALLY PRESENT FOR THE KEY PORTIONS OF THE EVALUATION AND MANAGEMENT (E/M) SERVICE PROVIDED.  I AGREE WITH THE ABOVE HISTORY, PHYSICAL, AND PLAN WHICH I HAVE REVIEWED AND EDITED WHERE APPROPRIATE

## 2017-10-18 NOTE — PROGRESS NOTE ADULT - ATTENDING COMMENTS
Patient will be d/c to a rehabilitation center today( [facility]) and will have HD at [facility] .  Patient is to have coumadin tonight with INR monitoring  as per Rehab protocol .  Patient's daughter , Jane Lange was called by me and informed of the discharge from the hospital.

## 2017-10-20 ENCOUNTER — INPATIENT (INPATIENT)
Facility: HOSPITAL | Age: 72
LOS: 31 days | Discharge: NURSING, REHAB OR LONG-TERM CARE | DRG: 264 | End: 2017-11-21
Attending: INTERNAL MEDICINE | Admitting: HOSPITALIST
Payer: MEDICARE

## 2017-10-20 VITALS — DIASTOLIC BLOOD PRESSURE: 62 MMHG | HEART RATE: 78 BPM | SYSTOLIC BLOOD PRESSURE: 99 MMHG

## 2017-10-20 DIAGNOSIS — Z98.890 OTHER SPECIFIED POSTPROCEDURAL STATES: Chronic | ICD-10-CM

## 2017-10-20 DIAGNOSIS — I95.9 HYPOTENSION, UNSPECIFIED: ICD-10-CM

## 2017-10-20 LAB
ALBUMIN SERPL ELPH-MCNC: 3 G/DL — LOW (ref 3.3–5)
ALP SERPL-CCNC: 101 U/L — SIGNIFICANT CHANGE UP (ref 40–120)
ALT FLD-CCNC: 38 U/L RC — SIGNIFICANT CHANGE UP (ref 10–45)
ANION GAP SERPL CALC-SCNC: 25 MMOL/L — HIGH (ref 5–17)
APTT BLD: 40.4 SEC — HIGH (ref 27.5–37.4)
AST SERPL-CCNC: 31 U/L — SIGNIFICANT CHANGE UP (ref 10–40)
BASOPHILS # BLD AUTO: 0.1 K/UL — SIGNIFICANT CHANGE UP (ref 0–0.2)
BASOPHILS NFR BLD AUTO: 0.8 % — SIGNIFICANT CHANGE UP (ref 0–2)
BILIRUB SERPL-MCNC: 0.4 MG/DL — SIGNIFICANT CHANGE UP (ref 0.2–1.2)
BUN SERPL-MCNC: 108 MG/DL — HIGH (ref 7–23)
CALCIUM SERPL-MCNC: 8.7 MG/DL — SIGNIFICANT CHANGE UP (ref 8.4–10.5)
CHLORIDE SERPL-SCNC: 97 MMOL/L — SIGNIFICANT CHANGE UP (ref 96–108)
CO2 SERPL-SCNC: 18 MMOL/L — LOW (ref 22–31)
CREAT SERPL-MCNC: 7.37 MG/DL — HIGH (ref 0.5–1.3)
EOSINOPHIL # BLD AUTO: 0.2 K/UL — SIGNIFICANT CHANGE UP (ref 0–0.5)
EOSINOPHIL NFR BLD AUTO: 2.7 % — SIGNIFICANT CHANGE UP (ref 0–6)
GAS PNL BLDV: SIGNIFICANT CHANGE UP
GLUCOSE SERPL-MCNC: 143 MG/DL — HIGH (ref 70–99)
HCT VFR BLD CALC: 32.6 % — LOW (ref 39–50)
HGB BLD-MCNC: 10.4 G/DL — LOW (ref 13–17)
INR BLD: 3.09 RATIO — HIGH (ref 0.88–1.16)
LYMPHOCYTES # BLD AUTO: 1.3 K/UL — SIGNIFICANT CHANGE UP (ref 1–3.3)
LYMPHOCYTES # BLD AUTO: 17.3 % — SIGNIFICANT CHANGE UP (ref 13–44)
MAGNESIUM SERPL-MCNC: 2.4 MG/DL — SIGNIFICANT CHANGE UP (ref 1.6–2.6)
MCHC RBC-ENTMCNC: 28.8 PG — SIGNIFICANT CHANGE UP (ref 27–34)
MCHC RBC-ENTMCNC: 31.9 GM/DL — LOW (ref 32–36)
MCV RBC AUTO: 90.3 FL — SIGNIFICANT CHANGE UP (ref 80–100)
MONOCYTES # BLD AUTO: 0.6 K/UL — SIGNIFICANT CHANGE UP (ref 0–0.9)
MONOCYTES NFR BLD AUTO: 8.6 % — SIGNIFICANT CHANGE UP (ref 2–14)
NEUTROPHILS # BLD AUTO: 5.2 K/UL — SIGNIFICANT CHANGE UP (ref 1.8–7.4)
NEUTROPHILS NFR BLD AUTO: 70.7 % — SIGNIFICANT CHANGE UP (ref 43–77)
PHOSPHATE SERPL-MCNC: 7.4 MG/DL — HIGH (ref 2.5–4.5)
PLATELET # BLD AUTO: 231 K/UL — SIGNIFICANT CHANGE UP (ref 150–400)
POTASSIUM SERPL-MCNC: 4.9 MMOL/L — SIGNIFICANT CHANGE UP (ref 3.5–5.3)
POTASSIUM SERPL-SCNC: 4.9 MMOL/L — SIGNIFICANT CHANGE UP (ref 3.5–5.3)
PROT SERPL-MCNC: 6.8 G/DL — SIGNIFICANT CHANGE UP (ref 6–8.3)
PROTHROM AB SERPL-ACNC: 34.5 SEC — HIGH (ref 9.8–12.7)
RBC # BLD: 3.61 M/UL — LOW (ref 4.2–5.8)
RBC # FLD: 13.8 % — SIGNIFICANT CHANGE UP (ref 10.3–14.5)
SODIUM SERPL-SCNC: 140 MMOL/L — SIGNIFICANT CHANGE UP (ref 135–145)
WBC # BLD: 7.4 K/UL — SIGNIFICANT CHANGE UP (ref 3.8–10.5)
WBC # FLD AUTO: 7.4 K/UL — SIGNIFICANT CHANGE UP (ref 3.8–10.5)

## 2017-10-20 PROCEDURE — 71010: CPT | Mod: 26

## 2017-10-20 PROCEDURE — 93010 ELECTROCARDIOGRAM REPORT: CPT

## 2017-10-20 PROCEDURE — 99285 EMERGENCY DEPT VISIT HI MDM: CPT | Mod: 25,GC

## 2017-10-20 NOTE — ED PROVIDER NOTE - PHYSICAL EXAMINATION
***GEN - unresponsive to questions, withdraws to pain.    ***HEAD - NC/AT  ***EYES/NOSE - PEERL, EOMI, mucous membranes moist, no discharge   ***THROAT: Oral cavity and pharynx normal. No inflammation, swelling, exudate, or lesions.    ***NECK: supple, non-tender no lymphadenopathy  ***PULMONARY - CTA b/l, symmetric breath sounds.   ***CARDIAC- s1s2, RRR, no murmur  ***ABDOMEN - +BS, ND, NT, soft, no guarding, no rebound, no organomegaly  ***BACK - no CVA tenderness, Normal  spine, no spinal TTP  ***EXTREMITIES - symmetric pulses, 2+ dp, capillary refill < 2 seconds, no clubbing, no cyanosis, no edema   ***SKIN - warm, dry, intact, no rash or bruising.  Port anterior R chest wall.    ***NEUROLOGIC - patient with residual R sided weakness.  Unable to participate in neurologic examination.  Withdraws to pain only.

## 2017-10-20 NOTE — ED ADULT NURSE NOTE - OBJECTIVE STATEMENT
72 year old male arrived by EMS for hypotension.  Patient was supposed to have HD (M,W,F) and was not dialyzed because he was hypotensive.  Patient from facility and no family present upon arrival to ED.  Patient lethargic, nonverbal and withdraws form pain.  Patient has right anterior chest wall catheter for HD and site free of redness or drainage.  Patient has equal and symmetrical chest rise and breathing is unlabored.  Patient placed on 2L NC by EMS, but patient does not normally wear O2.  IV established by EMS in left wrist #18 and #20 IV established in left AC by ED.  Patient arrived with brace on right foot and right arm.  Patient not following commands or verbal.  Patient placed on CM and in SR and blood glucose was 146 at bedside.  Safety ensured. 72 year old male arrived by EMS for hypotension.  Patient was supposed to have HD (M,W,F) and was not dialyzed because he was hypotensive.  Patient from facility and no family present upon arrival to ED.  Patient lethargic, nonverbal and withdraws form pain.  Patient has right anterior chest wall catheter for HD and site free of redness or drainage.  Patient has equal and symmetrical chest rise and breathing is unlabored.  Patient placed on 2L NC by EMS, but patient does not normally wear O2.  IV established by EMS in left wrist #18 and #20 IV established in left AC by ED.  Patient arrived with brace on right foot and right arm.  Patient not following commands or verbal.  Patient has healing sacral ulcer free of drainage with bandage CDI and surrounding SDTI.  L/s clear b/l, abdomen is soft and nontender, no swelling of the extremities noted.  Patient placed on CM and in SR and blood glucose was 146 at bedside.  Safety ensured.

## 2017-10-20 NOTE — ED ADULT NURSE NOTE - PMH
Acute renal failure    Anemia    Bipolar 1 disorder    Bipolar affective disorder    BPH (benign prostatic hyperplasia)    Cardiac arrhythmia    Cholecystitis    CKD (chronic kidney disease)    CKD (chronic kidney disease)    COPD (chronic obstructive pulmonary disease)    COPD (chronic obstructive pulmonary disease)    Coronary artery disease    Coronary atherosclerosis    CVA (cerebral vascular accident)    Diabetes mellitus    Dizziness    DM (diabetes mellitus)    Dyslipidemia    Fainting    HLD (hyperlipidemia)    HTN (hypertension)    Hydronephrosis    Hypertension    Left heart failure    Lumbar radiculopathy    Pancreatitis    Reflux esophagitis

## 2017-10-20 NOTE — ED PROVIDER NOTE - MEDICAL DECISION MAKING DETAILS
Hypotension likely normal for patient in setting of dialysis.  Patient has missed dialysis today.  Will admit for dialysis and hypotension. 73 yo M PMH CAD s/p 12-14 stents placed [id number], T2 DM. HTN HLD, w/ residual R sided facial weakness, radiculopathy with herniated disc, COPD, CKD Cr 2 months ago 1.2 on dialysis MWF, Hypotension likely normal for patient in setting of dialysis.  Patient has missed dialysis today.  Will admit for dialysis and hypotension. BP stable in ED, labs, CXR, EKG, reassess

## 2017-10-20 NOTE — ED PROVIDER NOTE - ATTENDING CONTRIBUTION TO CARE
71 yo M PMH CAD s/p 12-14 stents placed [id number], T2 DM. HTN HLD, w/ residual R sided facial weakness, radiculopathy with herniated disc, COPD, CKD Cr 2 months ago 1.2 on dialysis MWF, Hypotension likely normal for patient in setting of dialysis.  Patient has missed dialysis today.  Will admit for dialysis and hypotension. BP stable in ED, labs, CXR, EKG, reassess

## 2017-10-21 DIAGNOSIS — I95.3 HYPOTENSION OF HEMODIALYSIS: ICD-10-CM

## 2017-10-21 DIAGNOSIS — I48.0 PAROXYSMAL ATRIAL FIBRILLATION: ICD-10-CM

## 2017-10-21 DIAGNOSIS — R53.2 FUNCTIONAL QUADRIPLEGIA: ICD-10-CM

## 2017-10-21 DIAGNOSIS — R78.81 BACTEREMIA: ICD-10-CM

## 2017-10-21 DIAGNOSIS — R21 RASH AND OTHER NONSPECIFIC SKIN ERUPTION: ICD-10-CM

## 2017-10-21 DIAGNOSIS — R53.81 OTHER MALAISE: ICD-10-CM

## 2017-10-21 DIAGNOSIS — I21.4 NON-ST ELEVATION (NSTEMI) MYOCARDIAL INFARCTION: ICD-10-CM

## 2017-10-21 DIAGNOSIS — N40.1 BENIGN PROSTATIC HYPERPLASIA WITH LOWER URINARY TRACT SYMPTOMS: ICD-10-CM

## 2017-10-21 DIAGNOSIS — J44.9 CHRONIC OBSTRUCTIVE PULMONARY DISEASE, UNSPECIFIED: ICD-10-CM

## 2017-10-21 DIAGNOSIS — N18.6 END STAGE RENAL DISEASE: ICD-10-CM

## 2017-10-21 DIAGNOSIS — I10 ESSENTIAL (PRIMARY) HYPERTENSION: ICD-10-CM

## 2017-10-21 DIAGNOSIS — E11.9 TYPE 2 DIABETES MELLITUS WITHOUT COMPLICATIONS: ICD-10-CM

## 2017-10-21 DIAGNOSIS — R13.10 DYSPHAGIA, UNSPECIFIED: ICD-10-CM

## 2017-10-21 LAB
-  CANDIDA ALBICANS: SIGNIFICANT CHANGE UP
-  CANDIDA GLABRATA: SIGNIFICANT CHANGE UP
-  CANDIDA KRUSEI: SIGNIFICANT CHANGE UP
-  CANDIDA PARAPSILOSIS: SIGNIFICANT CHANGE UP
-  CANDIDA TROPICALIS: SIGNIFICANT CHANGE UP
-  COAGULASE NEGATIVE STAPHYLOCOCCUS: SIGNIFICANT CHANGE UP
-  K. PNEUMONIAE GROUP: SIGNIFICANT CHANGE UP
-  KPC RESISTANCE GENE: SIGNIFICANT CHANGE UP
-  STREPTOCOCCUS SP. (NOT GRP A, B OR S PNEUMONIAE): SIGNIFICANT CHANGE UP
A BAUMANNII DNA SPEC QL NAA+PROBE: SIGNIFICANT CHANGE UP
ANION GAP SERPL CALC-SCNC: 25 MMOL/L — HIGH (ref 5–17)
BASOPHILS # BLD AUTO: 0.03 K/UL — SIGNIFICANT CHANGE UP (ref 0–0.2)
BASOPHILS NFR BLD AUTO: 0.4 % — SIGNIFICANT CHANGE UP (ref 0–2)
BUN SERPL-MCNC: 114 MG/DL — HIGH (ref 7–23)
CALCIUM SERPL-MCNC: 9 MG/DL — SIGNIFICANT CHANGE UP (ref 8.4–10.5)
CHLORIDE SERPL-SCNC: 97 MMOL/L — SIGNIFICANT CHANGE UP (ref 96–108)
CK MB BLD-MCNC: 2.9 % — SIGNIFICANT CHANGE UP (ref 0–3.5)
CK MB CFR SERPL CALC: 5.9 NG/ML — SIGNIFICANT CHANGE UP (ref 0–6.7)
CK SERPL-CCNC: 176 U/L — SIGNIFICANT CHANGE UP (ref 30–200)
CK SERPL-CCNC: 203 U/L — HIGH (ref 30–200)
CO2 SERPL-SCNC: 20 MMOL/L — LOW (ref 22–31)
CORTIS AM PEAK SERPL-MCNC: 21.3 UG/DL — HIGH (ref 6–18.4)
CREAT SERPL-MCNC: 7.78 MG/DL — HIGH (ref 0.5–1.3)
E CLOAC COMP DNA BLD POS QL NAA+PROBE: SIGNIFICANT CHANGE UP
E COLI DNA BLD POS QL NAA+NON-PROBE: SIGNIFICANT CHANGE UP
ENTEROCOC DNA BLD POS QL NAA+NON-PROBE: SIGNIFICANT CHANGE UP
ENTEROCOC DNA BLD POS QL NAA+NON-PROBE: SIGNIFICANT CHANGE UP
EOSINOPHIL # BLD AUTO: 0.29 K/UL — SIGNIFICANT CHANGE UP (ref 0–0.5)
EOSINOPHIL NFR BLD AUTO: 4.3 % — SIGNIFICANT CHANGE UP (ref 0–6)
GLUCOSE BLDC GLUCOMTR-MCNC: 105 MG/DL — HIGH (ref 70–99)
GLUCOSE BLDC GLUCOMTR-MCNC: 138 MG/DL — HIGH (ref 70–99)
GLUCOSE BLDC GLUCOMTR-MCNC: 162 MG/DL — HIGH (ref 70–99)
GLUCOSE BLDC GLUCOMTR-MCNC: 97 MG/DL — SIGNIFICANT CHANGE UP (ref 70–99)
GLUCOSE SERPL-MCNC: 110 MG/DL — HIGH (ref 70–99)
GP B STREP DNA BLD POS QL NAA+NON-PROBE: SIGNIFICANT CHANGE UP
GRAM STN FLD: SIGNIFICANT CHANGE UP
HAEM INFLU DNA BLD POS QL NAA+NON-PROBE: SIGNIFICANT CHANGE UP
HCT VFR BLD CALC: 32.2 % — LOW (ref 39–50)
HGB BLD-MCNC: 10.1 G/DL — LOW (ref 13–17)
IMM GRANULOCYTES NFR BLD AUTO: 0.1 % — SIGNIFICANT CHANGE UP (ref 0–1.5)
INR BLD: 2.93 RATIO — HIGH (ref 0.88–1.16)
K OXYTOCA DNA BLD POS QL NAA+NON-PROBE: SIGNIFICANT CHANGE UP
L MONOCYTOG DNA BLD POS QL NAA+NON-PROBE: SIGNIFICANT CHANGE UP
LYMPHOCYTES # BLD AUTO: 1.48 K/UL — SIGNIFICANT CHANGE UP (ref 1–3.3)
LYMPHOCYTES # BLD AUTO: 22 % — SIGNIFICANT CHANGE UP (ref 13–44)
MAGNESIUM SERPL-MCNC: 2.3 MG/DL — SIGNIFICANT CHANGE UP (ref 1.6–2.6)
MCHC RBC-ENTMCNC: 28 PG — SIGNIFICANT CHANGE UP (ref 27–34)
MCHC RBC-ENTMCNC: 31.4 GM/DL — LOW (ref 32–36)
MCV RBC AUTO: 89.2 FL — SIGNIFICANT CHANGE UP (ref 80–100)
METHOD TYPE: SIGNIFICANT CHANGE UP
MONOCYTES # BLD AUTO: 0.38 K/UL — SIGNIFICANT CHANGE UP (ref 0–0.9)
MONOCYTES NFR BLD AUTO: 5.7 % — SIGNIFICANT CHANGE UP (ref 2–14)
MRSA SPEC QL CULT: SIGNIFICANT CHANGE UP
MSSA DNA SPEC QL NAA+PROBE: SIGNIFICANT CHANGE UP
N MEN ISLT CULT: SIGNIFICANT CHANGE UP
NEUTROPHILS # BLD AUTO: 4.53 K/UL — SIGNIFICANT CHANGE UP (ref 1.8–7.4)
NEUTROPHILS NFR BLD AUTO: 67.5 % — SIGNIFICANT CHANGE UP (ref 43–77)
P AERUGINOSA DNA BLD POS NAA+NON-PROBE: SIGNIFICANT CHANGE UP
PHOSPHATE SERPL-MCNC: 8 MG/DL — HIGH (ref 2.5–4.5)
PLATELET # BLD AUTO: 240 K/UL — SIGNIFICANT CHANGE UP (ref 150–400)
POTASSIUM SERPL-MCNC: 4.9 MMOL/L — SIGNIFICANT CHANGE UP (ref 3.5–5.3)
POTASSIUM SERPL-SCNC: 4.9 MMOL/L — SIGNIFICANT CHANGE UP (ref 3.5–5.3)
PROTEUS SP DNA BLD POS QL NAA+NON-PROBE: SIGNIFICANT CHANGE UP
PROTHROM AB SERPL-ACNC: 33.8 SEC — HIGH (ref 10–13.1)
RBC # BLD: 3.61 M/UL — LOW (ref 4.2–5.8)
RBC # FLD: 14.7 % — HIGH (ref 10.3–14.5)
S MARCESCENS DNA BLD POS NAA+NON-PROBE: SIGNIFICANT CHANGE UP
S PNEUM DNA BLD POS QL NAA+NON-PROBE: SIGNIFICANT CHANGE UP
S PYO DNA BLD POS QL NAA+NON-PROBE: SIGNIFICANT CHANGE UP
SODIUM SERPL-SCNC: 142 MMOL/L — SIGNIFICANT CHANGE UP (ref 135–145)
SPECIMEN SOURCE: SIGNIFICANT CHANGE UP
TROPONIN T SERPL-MCNC: 0.41 NG/ML — HIGH (ref 0–0.06)
TROPONIN T SERPL-MCNC: 0.43 NG/ML — HIGH (ref 0–0.06)
TSH SERPL-MCNC: 5.93 UIU/ML — HIGH (ref 0.27–4.2)
WBC # BLD: 6.72 K/UL — SIGNIFICANT CHANGE UP (ref 3.8–10.5)
WBC # FLD AUTO: 6.72 K/UL — SIGNIFICANT CHANGE UP (ref 3.8–10.5)

## 2017-10-21 PROCEDURE — 99223 1ST HOSP IP/OBS HIGH 75: CPT | Mod: AI

## 2017-10-21 PROCEDURE — 99222 1ST HOSP IP/OBS MODERATE 55: CPT | Mod: GC

## 2017-10-21 RX ORDER — FLUTICASONE PROPIONATE 220 MCG
1 AEROSOL WITH ADAPTER (GRAM) INHALATION DAILY
Qty: 0 | Refills: 0 | Status: DISCONTINUED | OUTPATIENT
Start: 2017-10-21 | End: 2017-11-16

## 2017-10-21 RX ORDER — DEXTROSE 50 % IN WATER 50 %
1 SYRINGE (ML) INTRAVENOUS ONCE
Qty: 0 | Refills: 0 | Status: DISCONTINUED | OUTPATIENT
Start: 2017-10-21 | End: 2017-11-16

## 2017-10-21 RX ORDER — GLUCAGON INJECTION, SOLUTION 0.5 MG/.1ML
1 INJECTION, SOLUTION SUBCUTANEOUS ONCE
Qty: 0 | Refills: 0 | Status: DISCONTINUED | OUTPATIENT
Start: 2017-10-21 | End: 2017-11-16

## 2017-10-21 RX ORDER — LORATADINE 10 MG/1
10 TABLET ORAL ONCE
Qty: 0 | Refills: 0 | Status: COMPLETED | OUTPATIENT
Start: 2017-10-21 | End: 2017-10-21

## 2017-10-21 RX ORDER — VANCOMYCIN HCL 1 G
1000 VIAL (EA) INTRAVENOUS ONCE
Qty: 0 | Refills: 0 | Status: COMPLETED | OUTPATIENT
Start: 2017-10-21 | End: 2017-10-21

## 2017-10-21 RX ORDER — DEXTROSE 50 % IN WATER 50 %
25 SYRINGE (ML) INTRAVENOUS ONCE
Qty: 0 | Refills: 0 | Status: DISCONTINUED | OUTPATIENT
Start: 2017-10-21 | End: 2017-11-16

## 2017-10-21 RX ORDER — DEXTROSE 50 % IN WATER 50 %
12.5 SYRINGE (ML) INTRAVENOUS ONCE
Qty: 0 | Refills: 0 | Status: DISCONTINUED | OUTPATIENT
Start: 2017-10-21 | End: 2017-11-16

## 2017-10-21 RX ORDER — ATORVASTATIN CALCIUM 80 MG/1
40 TABLET, FILM COATED ORAL AT BEDTIME
Qty: 0 | Refills: 0 | Status: DISCONTINUED | OUTPATIENT
Start: 2017-10-21 | End: 2017-11-16

## 2017-10-21 RX ORDER — ASPIRIN/CALCIUM CARB/MAGNESIUM 324 MG
81 TABLET ORAL DAILY
Qty: 0 | Refills: 0 | Status: DISCONTINUED | OUTPATIENT
Start: 2017-10-21 | End: 2017-11-06

## 2017-10-21 RX ORDER — CLOTRIMAZOLE AND BETAMETHASONE DIPROPIONATE 10; .5 MG/G; MG/G
1 CREAM TOPICAL
Qty: 0 | Refills: 0 | Status: DISCONTINUED | OUTPATIENT
Start: 2017-10-21 | End: 2017-11-16

## 2017-10-21 RX ORDER — PIPERACILLIN AND TAZOBACTAM 4; .5 G/20ML; G/20ML
3.38 INJECTION, POWDER, LYOPHILIZED, FOR SOLUTION INTRAVENOUS EVERY 12 HOURS
Qty: 0 | Refills: 0 | Status: DISCONTINUED | OUTPATIENT
Start: 2017-10-21 | End: 2017-10-23

## 2017-10-21 RX ORDER — CLOPIDOGREL BISULFATE 75 MG/1
75 TABLET, FILM COATED ORAL DAILY
Qty: 0 | Refills: 0 | Status: DISCONTINUED | OUTPATIENT
Start: 2017-10-21 | End: 2017-11-16

## 2017-10-21 RX ORDER — INSULIN LISPRO 100/ML
VIAL (ML) SUBCUTANEOUS
Qty: 0 | Refills: 0 | Status: DISCONTINUED | OUTPATIENT
Start: 2017-10-21 | End: 2017-10-25

## 2017-10-21 RX ORDER — TAMSULOSIN HYDROCHLORIDE 0.4 MG/1
0.4 CAPSULE ORAL AT BEDTIME
Qty: 0 | Refills: 0 | Status: DISCONTINUED | OUTPATIENT
Start: 2017-10-21 | End: 2017-11-16

## 2017-10-21 RX ORDER — METOPROLOL TARTRATE 50 MG
25 TABLET ORAL
Qty: 0 | Refills: 0 | Status: DISCONTINUED | OUTPATIENT
Start: 2017-10-21 | End: 2017-10-21

## 2017-10-21 RX ORDER — SEVELAMER CARBONATE 2400 MG/1
1600 POWDER, FOR SUSPENSION ORAL
Qty: 0 | Refills: 0 | Status: DISCONTINUED | OUTPATIENT
Start: 2017-10-21 | End: 2017-10-30

## 2017-10-21 RX ORDER — TAMSULOSIN HYDROCHLORIDE 0.4 MG/1
0.4 CAPSULE ORAL AT BEDTIME
Qty: 0 | Refills: 0 | Status: DISCONTINUED | OUTPATIENT
Start: 2017-10-21 | End: 2017-10-21

## 2017-10-21 RX ORDER — AMIODARONE HYDROCHLORIDE 400 MG/1
200 TABLET ORAL DAILY
Qty: 0 | Refills: 0 | Status: DISCONTINUED | OUTPATIENT
Start: 2017-10-21 | End: 2017-11-16

## 2017-10-21 RX ORDER — PANTOPRAZOLE SODIUM 20 MG/1
40 TABLET, DELAYED RELEASE ORAL
Qty: 0 | Refills: 0 | Status: DISCONTINUED | OUTPATIENT
Start: 2017-10-21 | End: 2017-10-23

## 2017-10-21 RX ORDER — ALBUTEROL 90 UG/1
2 AEROSOL, METERED ORAL EVERY 6 HOURS
Qty: 0 | Refills: 0 | Status: DISCONTINUED | OUTPATIENT
Start: 2017-10-21 | End: 2017-11-16

## 2017-10-21 RX ORDER — SEVELAMER CARBONATE 2400 MG/1
1600 POWDER, FOR SUSPENSION ORAL
Qty: 0 | Refills: 0 | Status: DISCONTINUED | OUTPATIENT
Start: 2017-10-21 | End: 2017-10-21

## 2017-10-21 RX ORDER — SODIUM CHLORIDE 9 MG/ML
1000 INJECTION, SOLUTION INTRAVENOUS
Qty: 0 | Refills: 0 | Status: DISCONTINUED | OUTPATIENT
Start: 2017-10-21 | End: 2017-11-16

## 2017-10-21 RX ADMIN — TAMSULOSIN HYDROCHLORIDE 0.4 MILLIGRAM(S): 0.4 CAPSULE ORAL at 22:51

## 2017-10-21 RX ADMIN — PANTOPRAZOLE SODIUM 40 MILLIGRAM(S): 20 TABLET, DELAYED RELEASE ORAL at 08:43

## 2017-10-21 RX ADMIN — LORATADINE 10 MILLIGRAM(S): 10 TABLET ORAL at 12:43

## 2017-10-21 RX ADMIN — Medication 5 MILLIGRAM(S): at 06:54

## 2017-10-21 RX ADMIN — Medication 81 MILLIGRAM(S): at 12:43

## 2017-10-21 RX ADMIN — SEVELAMER CARBONATE 1600 MILLIGRAM(S): 2400 POWDER, FOR SUSPENSION ORAL at 12:39

## 2017-10-21 RX ADMIN — ATORVASTATIN CALCIUM 40 MILLIGRAM(S): 80 TABLET, FILM COATED ORAL at 22:50

## 2017-10-21 RX ADMIN — Medication 5 MILLIGRAM(S): at 22:50

## 2017-10-21 RX ADMIN — Medication 1: at 12:40

## 2017-10-21 RX ADMIN — SEVELAMER CARBONATE 1600 MILLIGRAM(S): 2400 POWDER, FOR SUSPENSION ORAL at 22:51

## 2017-10-21 RX ADMIN — CLOTRIMAZOLE AND BETAMETHASONE DIPROPIONATE 1 APPLICATION(S): 10; .5 CREAM TOPICAL at 22:52

## 2017-10-21 RX ADMIN — CLOPIDOGREL BISULFATE 75 MILLIGRAM(S): 75 TABLET, FILM COATED ORAL at 12:43

## 2017-10-21 RX ADMIN — Medication 250 MILLIGRAM(S): at 17:05

## 2017-10-21 RX ADMIN — PIPERACILLIN AND TAZOBACTAM 25 GRAM(S): 4; .5 INJECTION, POWDER, LYOPHILIZED, FOR SOLUTION INTRAVENOUS at 17:05

## 2017-10-21 RX ADMIN — AMIODARONE HYDROCHLORIDE 200 MILLIGRAM(S): 400 TABLET ORAL at 06:54

## 2017-10-21 RX ADMIN — Medication 1 PUFF(S): at 12:40

## 2017-10-21 NOTE — H&P ADULT - NSHPLABSRESULTS_GEN_ALL_CORE
labs reviewed personally by me - cbc stable  inr 3.09  bun 108, creat 7.37, lact 2.5  ekg reviewed personally by me, depressed t's v3-v6 new from 10/2  CXR no acute changes

## 2017-10-21 NOTE — CONSULT NOTE ADULT - SUBJECTIVE AND OBJECTIVE BOX
HPI:  72M with CAD s/p multiple PCI, DM, HTN, HLD, COPD, ESRD on HD, Bipolar disorder admitted 10/20/17 from dialysis for hypotension.   Recently admitted 9/6-10/18/17 for NSTEMI s/p PCI, course complicated by VT arrest and CVA.   Seen with pyelonephritis.       PAST MEDICAL & SURGICAL HISTORY:  CVA (cerebral vascular accident)  COPD (chronic obstructive pulmonary disease)  BPH (benign prostatic hyperplasia)  Bipolar affective disorder  CKD (chronic kidney disease)  Pancreatitis  Hypertension  Dyslipidemia  Diabetes mellitus  Coronary artery disease  HLD (hyperlipidemia)  Anemia  Acute renal failure  CKD (chronic kidney disease)  COPD (chronic obstructive pulmonary disease)  Fainting  Cardiac arrhythmia  Dizziness  Hydronephrosis  Cholecystitis  Bipolar 1 disorder  DM (diabetes mellitus)  HTN (hypertension)  Lumbar radiculopathy  Left heart failure  Reflux esophagitis  Coronary atherosclerosis  History of cardiac catheterization  No significant past surgical history      Social history:     Smoking,    ETOH,      IVDU       FAMILY HISTORY:  No pertinent family history in first degree relatives  - Reviewed,Non contributory   REVIEW OF SYSTEMS  General:	Denies any malaise fatigue or chills. Fevers absent    Skin:No rash  	  Ophthalmologic:Denies any visual complaints,discharge redness or photophobia  	  ENMT:No nasal discharge,headache,sinus congestion or throat pain.No dental complaints    Respiratory and Thorax:No cough,sputum or chest pain.Denies shortness of breath  	  Cardiovascular:	No chest pain,palpitaions or dizziness    Gastrointestinal:	NO nausea,abdominal pain or diarrhea.    Genitourinary:	No dysuria,frequency. No flank pain    Musculoskeletal:	No joint swelling or pain.No weakness    Neurological:No confusion,diziness.No extremity weakness.No bladder or bowel incontinence	    Psychiatric:No delusions or hallucinations	    Hematology/Lymphatics:	No LN swelling.No gum bleeding     Endocrine:	No recent weight gain or loss.No abnormal heat/cold intolerance    Allergic/Immunologic:	No hives or rash   Allergies    No Known Allergies    Intolerances        Antimicrobials:          Vital Signs Last 24 Hrs  T(C): 36.9 (21 Oct 2017 07:30), Max: 36.9 (21 Oct 2017 02:57)  T(F): 98.5 (21 Oct 2017 07:30), Max: 98.5 (21 Oct 2017 07:30)  HR: 85 (21 Oct 2017 07:30) (77 - 85)  BP: 120/69 (21 Oct 2017 07:30) (98/60 - 136/73)  BP(mean): 72 (21 Oct 2017 00:52) (72 - 72)  RR: 18 (21 Oct 2017 07:30) (16 - 19)  SpO2: 99% (21 Oct 2017 07:30) (97% - 100%)    PHYSICAL EXAM:Pleasant patient in no acute distress.      Constitutional:Comfortable.Awake and alert  No cachexia     Eyes:PERRL EOMI.NO discharge or conjunctival injection    ENMT:No sinus tenderness.No thrush.No pharyngeal exudate or erythema.Fair dental hygiene    Neck:Supple,No LN,no JVD      Respiratory:Good air entry bilaterally,CTA    Cardiovascular:S1 S2 wnl, No murmurs,rub or gallops    Gastrointestinal:Soft BS(+) no tenderness no masses ,No rebound or guarding    Genitourinary:No CVA tendereness     Rectal:    Extremities:No cyanosis,clubbing or edema.    Vascular:peripheral pulses felt    Neurological:AAO X 3,No grossly focal deficits    Skin:No rash     Lymph Nodes:No palpable LNs    Musculoskeletal:No joint swelling or LOM    Psychiatric:Affect normal.                                10.1   6.72  )-----------( 240      ( 21 Oct 2017 08:48 )             32.2         10-21    142  |  97  |  114<H>  ----------------------------<  110<H>  4.9   |  20<L>  |  7.78<H>    Ca    9.0      21 Oct 2017 06:09  Phos  8.0     10-21  Mg     2.3     10-21    TPro  6.8  /  Alb  3.0<L>  /  TBili  0.4  /  DBili  x   /  AST  31  /  ALT  38  /  AlkPhos  101  10-20      RECENT CULTURES:  10-21 @ 00:35  .Blood Blood-Peripheral  --  --  --    Growth in aerobic bottle: Gram Variable Rods  ***Blood Panel PCR results on this specimen are available  approximately 3 hours after the Gram stain result.***  Gram stain, PCR, and/or culture results may not always  correspond due to difference in methodologies.  ************************************************************  This PCR assay was performed using [employer].  The following targets are tested for: Enterococcus,  vancomycin resistant enterococci, Listeria monocytogenes,  coagulase negative staphylococci, S. aureus,  methicillin resistant S. aureus, Streptococcus agalactiae  (Group B), S. pneumoniae, S. pyogenes (Group A),  Acinetobacter baumannii, Enterobacter cloacae, E. coli,  Klebsiella oxytoca, K. pneumoniae, Proteus sp.,  Serratia marcescens, Haemophilus influenzae,  Neisseria meningitidis, Pseudomonas aeruginosa, Candida  albicans, C. glabrata, C krusei, C parapsilosis,  C. tropicalis and the KPC resistance gene.  --        Radiology: HPI:  72M with DM, HTN, HLD, CAD s/p numerous PCI, prior CVA with some left hemiparesis, COPD, CKD with chronic nephrolithiasis, and Bipolar disorder was recently admitted 9/6-10/18/17 for NSTEMI s/p PCI, course complicated by PEA/VT arrest, new embolic CVA and   initially for flank pain and pyelonephritis, treat with Ceftriaxone, but hospital course complicated by     was admitted 10/20/17 from his dialysis center for hypotension.   During the course he was seen by ID and treated with a course of Ceftriaxone for pyelonephritis, completed 9/21/17. Blood and urine cultures were all negative.       PAST MEDICAL & SURGICAL HISTORY:  CVA (cerebral vascular accident)  COPD (chronic obstructive pulmonary disease)  BPH (benign prostatic hyperplasia)  Bipolar affective disorder  CKD (chronic kidney disease)  Pancreatitis  Hypertension  Dyslipidemia  Diabetes mellitus  Coronary artery disease  HLD (hyperlipidemia)  Anemia  Acute renal failure  CKD (chronic kidney disease)  COPD (chronic obstructive pulmonary disease)  Fainting  Cardiac arrhythmia  Dizziness  Hydronephrosis  Cholecystitis  Bipolar 1 disorder  DM (diabetes mellitus)  HTN (hypertension)  Lumbar radiculopathy  Left heart failure  Reflux esophagitis  Coronary atherosclerosis  History of cardiac catheterization  No significant past surgical history      Social history:     Smoking,    ETOH,      IVDU       FAMILY HISTORY:  No pertinent family history in first degree relatives  - Reviewed,Non contributory   REVIEW OF SYSTEMS  General:	Denies any malaise fatigue or chills. Fevers absent    Skin:No rash  	  Ophthalmologic:Denies any visual complaints,discharge redness or photophobia  	  ENMT:No nasal discharge,headache,sinus congestion or throat pain.No dental complaints    Respiratory and Thorax:No cough,sputum or chest pain.Denies shortness of breath  	  Cardiovascular:	No chest pain,palpitaions or dizziness    Gastrointestinal:	NO nausea,abdominal pain or diarrhea.    Genitourinary:	No dysuria,frequency. No flank pain    Musculoskeletal:	No joint swelling or pain.No weakness    Neurological:No confusion,diziness.No extremity weakness.No bladder or bowel incontinence	    Psychiatric:No delusions or hallucinations	    Hematology/Lymphatics:	No LN swelling.No gum bleeding     Endocrine:	No recent weight gain or loss.No abnormal heat/cold intolerance    Allergic/Immunologic:	No hives or rash   Allergies    No Known Allergies    Intolerances        Antimicrobials:          Vital Signs Last 24 Hrs  T(C): 36.9 (21 Oct 2017 07:30), Max: 36.9 (21 Oct 2017 02:57)  T(F): 98.5 (21 Oct 2017 07:30), Max: 98.5 (21 Oct 2017 07:30)  HR: 85 (21 Oct 2017 07:30) (77 - 85)  BP: 120/69 (21 Oct 2017 07:30) (98/60 - 136/73)  BP(mean): 72 (21 Oct 2017 00:52) (72 - 72)  RR: 18 (21 Oct 2017 07:30) (16 - 19)  SpO2: 99% (21 Oct 2017 07:30) (97% - 100%)    PHYSICAL EXAM:Pleasant patient in no acute distress.      Constitutional:Comfortable.Awake and alert  No cachexia     Eyes:PERRL EOMI.NO discharge or conjunctival injection    ENMT:No sinus tenderness.No thrush.No pharyngeal exudate or erythema.Fair dental hygiene    Neck:Supple,No LN,no JVD      Respiratory:Good air entry bilaterally,CTA    Cardiovascular:S1 S2 wnl, No murmurs,rub or gallops    Gastrointestinal:Soft BS(+) no tenderness no masses ,No rebound or guarding    Genitourinary:No CVA tendereness     Rectal:    Extremities:No cyanosis,clubbing or edema.    Vascular:peripheral pulses felt    Neurological:AAO X 3,No grossly focal deficits    Skin:No rash     Lymph Nodes:No palpable LNs    Musculoskeletal:No joint swelling or LOM    Psychiatric:Affect normal.                                10.1   6.72  )-----------( 240      ( 21 Oct 2017 08:48 )             32.2         10-21    142  |  97  |  114<H>  ----------------------------<  110<H>  4.9   |  20<L>  |  7.78<H>    Ca    9.0      21 Oct 2017 06:09  Phos  8.0     10-21  Mg     2.3     10-21    TPro  6.8  /  Alb  3.0<L>  /  TBili  0.4  /  DBili  x   /  AST  31  /  ALT  38  /  AlkPhos  101  10-20      RECENT CULTURES:  Culture - Blood (10.21.17 @ 00:35)  Growth in aerobic bottle: Gram Variable Rods    -  Multidrug (KPC pos) resistant organism: Nondet    -  Staphylococcus aureus: Nondet    -  Methicillin resistant Staphylococcus aureus (MRSA): Nondet    -  Coagulase negative Staphylococcus: Nondet    -  Enterococcus species: Nondet    -  Vancomycin resistant Enterococcus sp.: Nondet    -  Escherichia coli: Nondet    -  Klebsiella oxytoca: Nondet    -  Klebsiella pneumoniae: Nondet    -  Serratia marcescens: Nondet    -  Proteus species: Nondet    -  Haemophilus influenzae: Nondet    -  Listeria monocytogenes: Nondet    -  Neisseria meningitidis: Nondet    -  Pseudomonas aeruginosa: Nondet    -  Acinetobacter baumanii: Nondet    -  Enterobacter cloacae complex: Nondet    -  Streptococcus sp. (Not Grp A, B or S pneumoniae): Nondet    -  Streptococcus agalactiae (Group B): Nondet    -  Streptococcus pyogenes (Group A): Nondet    -  Streptococcus pneumoniae: Nondet    -  Candida albicans: Nondet    -  Candida glabrata: Nondet    -  Candida krusei: Nondet    -  Candida parapsilosis: Nondet    -  Candida tropicalis: Nondet    Radiology:  Xray Chest 1 View AP- PORTABLE-Urgent (10.20.17 @ 21:47)   Right-sided dialysis catheter tip in superior vena cava.  No gross opacity. No pleural effusion or pneumothorax.   Cardiomegaly despite projection.   No acute osseous normality.  IMPRESSION:  No acute disease. HPI:  72M with DM, HTN, HLD, CAD s/p numerous PCI, prior CVA with some left hemiparesis, COPD, CKD with chronic nephrolithiasis, and Bipolar disorder was recently admitted 9/6-10/18/17 for NSTEMI s/p PCI, course complicated by PEA/VT arrest, new embolic CVA and progression of renal failure now ESRD on HD via Right permacath. Also presented with flank pain at the time and appears to have been treated for pyelonephritis with Ceftriaxone from 9/6-9/21/17. Blood and urine cultures were all negative.   Readmitted 10/20/17 for hypotension while receiving dialysis.   ID consulted for gram variable rods on blood cultures.   Patient is awake but not very interactive, not talking, unable to interview.     PAST MEDICAL & SURGICAL HISTORY:  CVA (cerebral vascular accident)  COPD (chronic obstructive pulmonary disease)  BPH (benign prostatic hyperplasia)  Bipolar affective disorder  CKD (chronic kidney disease)  Pancreatitis  Hypertension  Dyslipidemia  Diabetes mellitus  Coronary artery disease  HLD (hyperlipidemia)  Anemia  Acute renal failure  CKD (chronic kidney disease)  COPD (chronic obstructive pulmonary disease)  Fainting  Cardiac arrhythmia  Dizziness  Hydronephrosis  Cholecystitis  Bipolar 1 disorder  DM (diabetes mellitus)  HTN (hypertension)  Lumbar radiculopathy  Left heart failure  Reflux esophagitis  Coronary atherosclerosis  History of cardiac catheterization  No significant past surgical history    Social history:  Non smoker. Fully dependent for ADLs.     FAMILY HISTORY:  No pertinent family history in first degree relatives  - Reviewed,Non contributory     REVIEW OF SYSTEMS  Nods his head when asked if he has pain, unable to elaborate, does not answer when visual cues given for pain location   Unable to complete ROS due to clinical condition     Allergies    No Known Allergies    Intolerances        Antimicrobials:          Vital Signs Last 24 Hrs  T(C): 36.9 (21 Oct 2017 07:30), Max: 36.9 (21 Oct 2017 02:57)  T(F): 98.5 (21 Oct 2017 07:30), Max: 98.5 (21 Oct 2017 07:30)  HR: 85 (21 Oct 2017 07:30) (77 - 85)  BP: 120/69 (21 Oct 2017 07:30) (98/60 - 136/73)  BP(mean): 72 (21 Oct 2017 00:52) (72 - 72)  RR: 18 (21 Oct 2017 07:30) (16 - 19)  SpO2: 99% (21 Oct 2017 07:30) (97% - 100%)    PHYSICAL EXAM:  GEN: elderly, somewhat chronically ill appearing but actually in NAD. makes some eye contact, nods his head sometimes, opens mouth to command. not talking.   HEENT: normocephalic, oral mucosa moist, no tongue lesions   Neck: no LND  CVS: distant heart sounds, RRR   RESP: nonlabored on room air, clear, no wheezing or rhonchi   Abdomen: nondistended, bowel sounds present, soft, nontender  Extremities: atraumatic, no wounds to legs/feet. right leg and arm are stabilized in a sort of cast.   Skin: right IJ permacath in place, dressed, no signs of infection. patchy areas of faint erythema to right arm, right medial thigh. some erythema along left hip where diaper is against the skin. no clear cellulitis.   Neuro: awake, alert. unable to assess further                         10.1   6.72  )-----------( 240      ( 21 Oct 2017 08:48 )             32.2         10-21    142  |  97  |  114<H>  ----------------------------<  110<H>  4.9   |  20<L>  |  7.78<H>    Ca    9.0      21 Oct 2017 06:09  Phos  8.0     10-21  Mg     2.3     10-21    TPro  6.8  /  Alb  3.0<L>  /  TBili  0.4  /  DBili  x   /  AST  31  /  ALT  38  /  AlkPhos  101  10-20      RECENT CULTURES:  Culture - Blood (10.21.17 @ 00:35)  Growth in aerobic bottle: Gram Variable Rods    -  Multidrug (KPC pos) resistant organism: Nondet    -  Staphylococcus aureus: Nondet    -  Methicillin resistant Staphylococcus aureus (MRSA): Nondet    -  Coagulase negative Staphylococcus: Nondet    -  Enterococcus species: Nondet    -  Vancomycin resistant Enterococcus sp.: Nondet    -  Escherichia coli: Nondet    -  Klebsiella oxytoca: Nondet    -  Klebsiella pneumoniae: Nondet    -  Serratia marcescens: Nondet    -  Proteus species: Nondet    -  Haemophilus influenzae: Nondet    -  Listeria monocytogenes: Nondet    -  Neisseria meningitidis: Nondet    -  Pseudomonas aeruginosa: Nondet    -  Acinetobacter baumanii: Nondet    -  Enterobacter cloacae complex: Nondet    -  Streptococcus sp. (Not Grp A, B or S pneumoniae): Nondet    -  Streptococcus agalactiae (Group B): Nondet    -  Streptococcus pyogenes (Group A): Nondet    -  Streptococcus pneumoniae: Nondet    -  Candida albicans: Nondet    -  Candida glabrata: Nondet    -  Candida krusei: Nondet    -  Candida parapsilosis: Nondet    -  Candida tropicalis: Nondet    Radiology:  Xray Chest 1 View AP- PORTABLE-Urgent (10.20.17 @ 21:47)   Right-sided dialysis catheter tip in superior vena cava.  No gross opacity. No pleural effusion or pneumothorax.   Cardiomegaly despite projection.   No acute osseous normality.  IMPRESSION:  No acute disease.

## 2017-10-21 NOTE — PROGRESS NOTE ADULT - PROBLEM SELECTOR PLAN 1
Prelim blood cultures with gram variable rods - patient with hypotension in HD.  ID called for consult, repeat blood culture  Patient afebrile, at baseline non verbal, patient also with diffuse maculopapular rash

## 2017-10-21 NOTE — PROGRESS NOTE ADULT - ASSESSMENT
72 M hx ESRD on HD, CAD/PCI, T2DM, CVA with R sided weakness, COPD a/w episode of hypotension in hemodialysis found with diffuse maculopapular rash and now blood cultures with gram variable rods

## 2017-10-21 NOTE — H&P ADULT - PROBLEM SELECTOR PLAN 9
pt currently in sinus rhythm  monitor for episodes of AF on tele  INR slightly supratherapeutic, holding coumadin tonight and ck inr in am

## 2017-10-21 NOTE — PROGRESS NOTE ADULT - PROBLEM SELECTOR PROBLEM 7
pt here with  for evaluation. states she took her BP and it has been low. pts blood pressure at this time WNL. pt sitting in chair in room 2, appears comfortable and offers no complaints. pt states she has hx of anemia and doesn't take iron because it makes her constipated. labs sent. to rw for eval. Benign prostatic hyperplasia with lower urinary tract symptoms, symptom details unspecified

## 2017-10-21 NOTE — H&P ADULT - RS GEN PE MLT RESP DETAILS PC
airway patent/respirations non-labored/clear to auscultation bilaterally/breath sounds equal/good air movement

## 2017-10-21 NOTE — PROGRESS NOTE ADULT - PROBLEM SELECTOR PLAN 2
diffuse maculopapular rash, discussed with daughter - rash is new, no new medications at rehab, no new meds given in ED.

## 2017-10-21 NOTE — CONSULT NOTE ADULT - SUBJECTIVE AND OBJECTIVE BOX
Patient is a 72y old  Male re-admitted with bacteremia. PMH of LIN due to hemodynamic ATN with no recovery on HD via Right IJ Permacath MWF,, CAD s/p 12-14 stents placed [id number], DM. HTN, HLD, Embolic CVA with right hemiplegia, COPD, bipolar, bilateral nephrolithiasis and bladder stones, admitted from rehab facility for episode of hypotension to SBP 90s while at dialysis. Pt was discharged 10/18 after long hospital stay when initially presented with NSTEMI/pulm edema, UTI, VT s/p shock, embolic CVA, new AF, DNR/DNI.  Chronic hypotension. Seen by I.D., started on antibiotics. Has extensive maculo-papular rash probably due to one of the antibiotics given in E.R.    PAST MEDICAL & SURGICAL HISTORY:  CVA (cerebral vascular accident)  COPD (chronic obstructive pulmonary disease)  BPH (benign prostatic hyperplasia)  Bipolar affective disorder  CKD  Pancreatitis  Hypertension  Dyslipidemia  Diabetes mellitus  Coronary artery disease  HLD (hyperlipidemia)  Anemia  Acute renal failure  COPD (chronic obstructive pulmonary disease)  Cardiac arrhythmia  Nephrolithiasis  Hydronephrosis  Cholecystitis  Bipolar 1 disorder  DM (diabetes mellitus)  HTN (hypertension)  Lumbar radiculopathy  Left heart failure  Reflux esophagitis  Coronary atherosclerosis    No Known Allergies    MEDICATIONS  (STANDING):  amiodarone    Tablet 200 milliGRAM(s) Oral daily  aspirin enteric coated 81 milliGRAM(s) Oral daily  atorvastatin 40 milliGRAM(s) Oral at bedtime  busPIRone 5 milliGRAM(s) Oral two times a day  clopidogrel Tablet 75 milliGRAM(s) Oral daily  clotrimazole/betamethasone Cream 1 Application(s) Topical two times a day  dextrose 5%. 1000 milliLiter(s) (50 mL/Hr) IV Continuous <Continuous>  dextrose 50% Injectable 12.5 Gram(s) IV Push once  dextrose 50% Injectable 25 Gram(s) IV Push once  dextrose 50% Injectable 25 Gram(s) IV Push once  fluticasone propionate   110 MICROgram(s) HFA Inhaler 1 Puff(s) Inhalation daily  insulin lispro (HumaLOG) corrective regimen sliding scale   SubCutaneous three times a day before meals  pantoprazole    Tablet 40 milliGRAM(s) Oral before breakfast  piperacillin/tazobactam IVPB. 3.375 Gram(s) IV Intermittent every 12 hours  sevelamer carbonate Powder 1600 milliGRAM(s) Oral three times a day with meals  tamsulosin 0.4 milliGRAM(s) Oral at bedtime  vancomycin  IVPB 1000 milliGRAM(s) IV Intermittent once    MEDICATIONS  (PRN):  ALBUTerol    90 MICROgram(s) HFA Inhaler 2 Puff(s) Inhalation every 6 hours PRN Shortness of Breath and/or Wheezing  dextrose Gel 1 Dose(s) Oral once PRN Blood Glucose LESS THAN 70 milliGRAM(s)/deciliter  glucagon  Injectable 1 milliGRAM(s) IntraMuscular once PRN Glucose LESS THAN 70 milligrams/deciliter    I&O's Detail    Vital Signs Last 24 Hrs  T(C): 36.2 (21 Oct 2017 14:47), Max: 36.9 (21 Oct 2017 02:57)  T(F): 97.2 (21 Oct 2017 14:47), Max: 98.5 (21 Oct 2017 07:30)  HR: 88 (21 Oct 2017 14:47) (77 - 88)  BP: 122/77 (21 Oct 2017 14:47) (98/60 - 136/73)  BP(mean): 72 (21 Oct 2017 00:52) (72 - 72)  RR: 18 (21 Oct 2017 14:47) (16 - 19)  SpO2: 99% (21 Oct 2017 14:47) (97% - 100%)    PHYSICAL EXAM:  General: NAD, responsive to name, makes eye contact when asked  No JVD on Left  Righ IJ Permacath dressed, dry, not tender  Respiratory: b/l air entry, clear  Cardiovascular: S1 S2 regular  Gastrointestinal: soft, NT, BS present  Extremities: no edema     CAPILLARY BLOOD GLUCOSE    POCT Blood Glucose.: 162 mg/dL (21 Oct 2017 11:37)  POCT Blood Glucose.: 105 mg/dL (21 Oct 2017 08:09)  POCT Blood Glucose.: 146 mg/dL (20 Oct 2017 19:56)    10-21    142  |  97  |  114<H>  ----------------------------<  110<H>  4.9   |  20<L>  |  7.78<H>    Ca    9.0      21 Oct 2017 06:09  Phos  8.0     10-21  Mg     2.3     10-21    TPro  6.8  /  Alb  3.0<L>  /  TBili  0.4  /  DBili  x   /  AST  31  /  ALT  38  /  AlkPhos  101  10-20    Hemoglobin: 10.1 g/dL (10-21 @ 08:48)  Hematocrit: 32.2 % (10-21 @ 08:48)  Blood Urea Nitrogen, Serum: 114 mg/dL (10-21 @ 06:09)  Potassium, Serum: 4.9 mmol/L (10-21 @ 06:09)  Calcium, Total Serum: 9.0 mg/dL (10-21 @ 06:09)  Hemoglobin: 10.4 g/dL (10-20 @ 21:00)  Hematocrit: 32.6 % (10-20 @ 21:00)  Potassium, Serum: 4.9 mmol/L (10-20 @ 21:00)  Blood Urea Nitrogen, Serum: 108 mg/dL (10-20 @ 21:00)  Calcium, Total Serum: 8.7 mg/dL (10-20 @ 21:00)      Creatinine, Serum: 7.78 (10-21 @ 06:09)  Creatinine, Serum: 7.37 (10-20 @ 21:00)    CBC Full  -  ( 21 Oct 2017 08:48 )  WBC Count : 6.72 K/uL  Hemoglobin : 10.1 g/dL  Hematocrit : 32.2 %  Platelet Count - Automated : 240 K/uL  Mean Cell Volume : 89.2 fl  Mean Cell Hemoglobin : 28.0 pg  Mean Cell Hemoglobin Concentration : 31.4 gm/dL  Auto Neutrophil # : 4.53 K/uL  Auto Lymphocyte # : 1.48 K/uL  Auto Monocyte # : 0.38 K/uL  Auto Eosinophil # : 0.29 K/uL  Auto Basophil # : 0.03 K/uL  Auto Neutrophil % : 67.5 %  Auto Lymphocyte % : 22.0 %  Auto Monocyte % : 5.7 %  Auto Eosinophil % : 4.3 %  Auto Basophil % : 0.4 %

## 2017-10-21 NOTE — PROGRESS NOTE ADULT - SUBJECTIVE AND OBJECTIVE BOX
Patient is a 72y old  Male who presents with a chief complaint of     SUBJECTIVE / OVERNIGHT EVENTS: Patient seen and examined at bedside, patient awake, nods but nonverbal    ROS:  Unable to obtain due to nonverbal and cannot follow commands    Allergies    No Known Allergies    Intolerances        MEDICATIONS  (STANDING):  amiodarone    Tablet 200 milliGRAM(s) Oral daily  aspirin enteric coated 81 milliGRAM(s) Oral daily  atorvastatin 40 milliGRAM(s) Oral at bedtime  busPIRone 5 milliGRAM(s) Oral two times a day  clopidogrel Tablet 75 milliGRAM(s) Oral daily  dextrose 5%. 1000 milliLiter(s) (50 mL/Hr) IV Continuous <Continuous>  dextrose 50% Injectable 12.5 Gram(s) IV Push once  dextrose 50% Injectable 25 Gram(s) IV Push once  dextrose 50% Injectable 25 Gram(s) IV Push once  fluticasone propionate   110 MICROgram(s) HFA Inhaler 1 Puff(s) Inhalation daily  insulin lispro (HumaLOG) corrective regimen sliding scale   SubCutaneous three times a day before meals  pantoprazole    Tablet 40 milliGRAM(s) Oral before breakfast  sevelamer carbonate Powder 1600 milliGRAM(s) Oral three times a day with meals    MEDICATIONS  (PRN):  ALBUTerol    90 MICROgram(s) HFA Inhaler 2 Puff(s) Inhalation every 6 hours PRN Shortness of Breath and/or Wheezing  dextrose Gel 1 Dose(s) Oral once PRN Blood Glucose LESS THAN 70 milliGRAM(s)/deciliter  glucagon  Injectable 1 milliGRAM(s) IntraMuscular once PRN Glucose LESS THAN 70 milligrams/deciliter      Vital Signs Last 24 Hrs  T(C): 36.9 (21 Oct 2017 07:30), Max: 36.9 (21 Oct 2017 02:57)  T(F): 98.5 (21 Oct 2017 07:30), Max: 98.5 (21 Oct 2017 07:30)  HR: 85 (21 Oct 2017 07:30) (77 - 85)  BP: 120/69 (21 Oct 2017 07:30) (98/60 - 136/73)  BP(mean): 72 (21 Oct 2017 00:52) (72 - 72)  RR: 18 (21 Oct 2017 07:30) (16 - 19)  SpO2: 99% (21 Oct 2017 07:30) (97% - 100%)  CAPILLARY BLOOD GLUCOSE      POCT Blood Glucose.: 162 mg/dL (21 Oct 2017 11:37)  POCT Blood Glucose.: 105 mg/dL (21 Oct 2017 08:09)  POCT Blood Glucose.: 146 mg/dL (20 Oct 2017 19:56)    I&O's Summary      PHYSICAL EXAM:  GENERAL: NAD  HEAD:  Atraumatic, Normocephalic  EYES: EOMI, PERRLA, conjunctiva and sclera clear  NECK: Supple, No JVD  CHEST/LUNG: Clear to auscultation bilaterally; No wheeze  HEART: Regular rate and rhythm; No murmurs, rubs, or gallops  ABDOMEN: Soft, Nontender, Nondistended; Bowel sounds present  EXTREMITIES:  2+ Peripheral Pulses, No clubbing, cyanosis, or edema  NEUROLOGY: AAOx0, non-focal  PSYCH: calm  SKIN: Diffuse maculopapular rash on b/l legs, arms, back, groin    LABS:                        10.1   6.72  )-----------( 240      ( 21 Oct 2017 08:48 )             32.2     10-21    142  |  97  |  114<H>  ----------------------------<  110<H>  4.9   |  20<L>  |  7.78<H>    Ca    9.0      21 Oct 2017 06:09  Phos  8.0     10-21  Mg     2.3     10-21    TPro  6.8  /  Alb  3.0<L>  /  TBili  0.4  /  DBili  x   /  AST  31  /  ALT  38  /  AlkPhos  101  10-20    PT/INR - ( 21 Oct 2017 08:48 )   PT: 33.8 sec;   INR: 2.93 ratio         PTT - ( 20 Oct 2017 21:04 )  PTT:40.4 sec  CARDIAC MARKERS ( 21 Oct 2017 12:00 )  x     / 0.41 ng/mL / 203 U/L / x     / 5.9 ng/mL  CARDIAC MARKERS ( 21 Oct 2017 06:09 )  x     / 0.43 ng/mL / 176 U/L / x     / x      CARDIAC MARKERS ( 20 Oct 2017 21:00 )  x     / 0.43 ng/mL / x     / x     / 3.7 ng/mL          Case Discussed with Family: d/w daughter - rash in new    Goals of Care: DNR

## 2017-10-21 NOTE — H&P ADULT - CONSTITUTIONAL COMMENTS
sleeping, moves left side in response to verbal stimuli but does not open eyes, breathing comfortably

## 2017-10-21 NOTE — H&P ADULT - HISTORY OF PRESENT ILLNESS
71 yo M PMH CAD s/p 12-14 stents placed [id number], T2 DM. HTN HLD, w/ residual R sided facial weakness, radiculopathy with herniated disc, COPD, CKD Cr 2 months ago 1.2 on dialysis MWF, bipolar (no meds followed by psychiatry), chronic kidney stones and has had urethral tents in the past presents from rehab facility for episode of hypotension to SBP 90s while at dialysis today.   Pt was discharged 10/18 after long hospital stay when initially presented with NSTEMI/pulm edema and transferred to [facility] 9/12 for LHC + BABAR x 3 c/b ARF +HD, + UTI on CTX.  Pt declined HD at one point, rising K, did undergo HD and later afterwards + unresponsive, CODE BLUE x 20 min with ROSC c/b VT s/p shock/amio load c/b CVA (r.weakness + embolic CVAs on CT), + new AF + heparin gtt c/b groin hematoma s/p txn, new CHF EF 35%, + palliative care discussions + dnr/dni.  Planned to cont HD via new [facility]even.  dysphagia treated with dysphagia 1 diet with honey liquids, PPM declined by family and sent to rehab.    In ED 99/62, hr 78, rr 18, temp 98.3, sat 100  no interventions done. 73 yo M PMH CAD s/p 12-14 stents placed [id number], T2 DM. HTN HLD, w/ residual R sided facial weakness, radiculopathy with herniated disc, COPD, CKD Cr 2 months ago 1.2 on dialysis MWF, bipolar (no meds followed by psychiatry), chronic kidney stones and has had urethral tents in the past presents from rehab facility for episode of hypotension to SBP 90s while at dialysis today.   Pt was discharged 10/18 after long hospital stay when initially presented with NSTEMI/pulm edema and transferred to [facility] 9/12 for LHC + BABAR x 3 + ARF on CKD +HD, + UTI on CTX.  Pt declined HD at one point, rising K, did undergo HD and later afterwards + unresponsive, CODE BLUE x 20 min with ROSC c/b VT s/p shock/amio load c/b CVA (r.weakness + embolic CVAs on CT), + new AF + heparin gtt c/b groin hematoma s/p txn, new CHF EF 35%, + palliative care discussions + dnr/dni.  [facility]  dysphagia treated with dysphagia 1 diet with honey liquids, PPM declined by family and sent to rehab.  Pt sbps low 100s to 110s.  on discharge trop 7.64    Spoke with daughter Anisa Lara [phone].  Pt has been poorly responsive since CVA.  Reportedly pt can grimace or shake head yes/no to some questions.  Has been eating/drinking and tolerated regular food yesterday.  Pt went to first HD yesterday and became hypotensive but daughter had not noted any change in his behavior or cognition.  Confirmed dnr/dni.    In ED 99/62, hr 78, rr 18, temp 98.3, sat 100  no interventions done.

## 2017-10-21 NOTE — H&P ADULT - PROBLEM SELECTOR PLAN 1
likely 2nd volume shifts in new hemodialysis patient  will monitor bp trend  hold beta blocker  may benefit from midodrine  ck am cortisol and tsh

## 2017-10-21 NOTE — H&P ADULT - PROBLEM SELECTOR PLAN 10
will call palliative care in am to assist in discussion of risks/benefits of HD given overall poor prognosis of patient

## 2017-10-21 NOTE — H&P ADULT - PROBLEM SELECTOR PLAN 2
though pt has depressed t's on ekg trop is dramatically lower than last troponin  will monitor on telemetry  ck Ghanshyam in am for trend  cont asa/plavix

## 2017-10-21 NOTE — PATIENT PROFILE ADULT. - FALL HARM RISK
bones(Osteoporosis,prev fx,steroid use,metastatic bone ca/coagulation(Bleeding disorder R/T clinical cond/anti-coags)/surgery

## 2017-10-22 LAB
-  COAGULASE NEGATIVE STAPHYLOCOCCUS: SIGNIFICANT CHANGE UP
ANION GAP SERPL CALC-SCNC: 16 MMOL/L — SIGNIFICANT CHANGE UP (ref 5–17)
BASOPHILS # BLD AUTO: 0 K/UL — SIGNIFICANT CHANGE UP (ref 0–0.2)
BASOPHILS NFR BLD AUTO: 0.7 % — SIGNIFICANT CHANGE UP (ref 0–2)
BUN SERPL-MCNC: 47 MG/DL — HIGH (ref 7–23)
CALCIUM SERPL-MCNC: 8.7 MG/DL — SIGNIFICANT CHANGE UP (ref 8.4–10.5)
CHLORIDE SERPL-SCNC: 98 MMOL/L — SIGNIFICANT CHANGE UP (ref 96–108)
CO2 SERPL-SCNC: 26 MMOL/L — SIGNIFICANT CHANGE UP (ref 22–31)
CREAT SERPL-MCNC: 3.86 MG/DL — HIGH (ref 0.5–1.3)
CULTURE RESULTS: SIGNIFICANT CHANGE UP
CULTURE RESULTS: SIGNIFICANT CHANGE UP
EOSINOPHIL # BLD AUTO: 0.3 K/UL — SIGNIFICANT CHANGE UP (ref 0–0.5)
EOSINOPHIL NFR BLD AUTO: 4.5 % — SIGNIFICANT CHANGE UP (ref 0–6)
GLUCOSE BLDC GLUCOMTR-MCNC: 110 MG/DL — HIGH (ref 70–99)
GLUCOSE BLDC GLUCOMTR-MCNC: 113 MG/DL — HIGH (ref 70–99)
GLUCOSE BLDC GLUCOMTR-MCNC: 122 MG/DL — HIGH (ref 70–99)
GLUCOSE BLDC GLUCOMTR-MCNC: 156 MG/DL — HIGH (ref 70–99)
GLUCOSE SERPL-MCNC: 101 MG/DL — HIGH (ref 70–99)
GRAM STN FLD: SIGNIFICANT CHANGE UP
HCT VFR BLD CALC: 31.2 % — LOW (ref 39–50)
HGB BLD-MCNC: 10 G/DL — LOW (ref 13–17)
INR BLD: 2.92 RATIO — HIGH (ref 0.88–1.16)
LYMPHOCYTES # BLD AUTO: 1.3 K/UL — SIGNIFICANT CHANGE UP (ref 1–3.3)
LYMPHOCYTES # BLD AUTO: 19.5 % — SIGNIFICANT CHANGE UP (ref 13–44)
MCHC RBC-ENTMCNC: 28.9 PG — SIGNIFICANT CHANGE UP (ref 27–34)
MCHC RBC-ENTMCNC: 32.1 GM/DL — SIGNIFICANT CHANGE UP (ref 32–36)
MCV RBC AUTO: 90.1 FL — SIGNIFICANT CHANGE UP (ref 80–100)
METHOD TYPE: SIGNIFICANT CHANGE UP
MONOCYTES # BLD AUTO: 0.6 K/UL — SIGNIFICANT CHANGE UP (ref 0–0.9)
MONOCYTES NFR BLD AUTO: 9.1 % — SIGNIFICANT CHANGE UP (ref 2–14)
NEUTROPHILS # BLD AUTO: 4.4 K/UL — SIGNIFICANT CHANGE UP (ref 1.8–7.4)
NEUTROPHILS NFR BLD AUTO: 66.1 % — SIGNIFICANT CHANGE UP (ref 43–77)
ORGANISM # SPEC MICROSCOPIC CNT: SIGNIFICANT CHANGE UP
PLATELET # BLD AUTO: 214 K/UL — SIGNIFICANT CHANGE UP (ref 150–400)
POTASSIUM SERPL-MCNC: 3.9 MMOL/L — SIGNIFICANT CHANGE UP (ref 3.5–5.3)
POTASSIUM SERPL-SCNC: 3.9 MMOL/L — SIGNIFICANT CHANGE UP (ref 3.5–5.3)
PROTHROM AB SERPL-ACNC: 32.2 SEC — HIGH (ref 9.8–12.7)
RBC # BLD: 3.46 M/UL — LOW (ref 4.2–5.8)
RBC # FLD: 13.7 % — SIGNIFICANT CHANGE UP (ref 10.3–14.5)
SODIUM SERPL-SCNC: 140 MMOL/L — SIGNIFICANT CHANGE UP (ref 135–145)
SPECIMEN SOURCE: SIGNIFICANT CHANGE UP
SPECIMEN SOURCE: SIGNIFICANT CHANGE UP
WBC # BLD: 6.6 K/UL — SIGNIFICANT CHANGE UP (ref 3.8–10.5)
WBC # FLD AUTO: 6.6 K/UL — SIGNIFICANT CHANGE UP (ref 3.8–10.5)

## 2017-10-22 PROCEDURE — 99233 SBSQ HOSP IP/OBS HIGH 50: CPT

## 2017-10-22 PROCEDURE — 74176 CT ABD & PELVIS W/O CONTRAST: CPT | Mod: 26

## 2017-10-22 RX ORDER — VANCOMYCIN HCL 1 G
1000 VIAL (EA) INTRAVENOUS
Qty: 0 | Refills: 0 | Status: DISCONTINUED | OUTPATIENT
Start: 2017-10-22 | End: 2017-10-23

## 2017-10-22 RX ADMIN — AMIODARONE HYDROCHLORIDE 200 MILLIGRAM(S): 400 TABLET ORAL at 06:47

## 2017-10-22 RX ADMIN — SEVELAMER CARBONATE 1600 MILLIGRAM(S): 2400 POWDER, FOR SUSPENSION ORAL at 13:02

## 2017-10-22 RX ADMIN — ATORVASTATIN CALCIUM 40 MILLIGRAM(S): 80 TABLET, FILM COATED ORAL at 22:08

## 2017-10-22 RX ADMIN — SEVELAMER CARBONATE 1600 MILLIGRAM(S): 2400 POWDER, FOR SUSPENSION ORAL at 08:59

## 2017-10-22 RX ADMIN — Medication 1 PUFF(S): at 22:09

## 2017-10-22 RX ADMIN — Medication 81 MILLIGRAM(S): at 13:01

## 2017-10-22 RX ADMIN — Medication 5 MILLIGRAM(S): at 06:47

## 2017-10-22 RX ADMIN — SEVELAMER CARBONATE 1600 MILLIGRAM(S): 2400 POWDER, FOR SUSPENSION ORAL at 18:54

## 2017-10-22 RX ADMIN — PIPERACILLIN AND TAZOBACTAM 25 GRAM(S): 4; .5 INJECTION, POWDER, LYOPHILIZED, FOR SOLUTION INTRAVENOUS at 08:54

## 2017-10-22 RX ADMIN — CLOTRIMAZOLE AND BETAMETHASONE DIPROPIONATE 1 APPLICATION(S): 10; .5 CREAM TOPICAL at 18:56

## 2017-10-22 RX ADMIN — CLOTRIMAZOLE AND BETAMETHASONE DIPROPIONATE 1 APPLICATION(S): 10; .5 CREAM TOPICAL at 06:48

## 2017-10-22 RX ADMIN — PIPERACILLIN AND TAZOBACTAM 25 GRAM(S): 4; .5 INJECTION, POWDER, LYOPHILIZED, FOR SOLUTION INTRAVENOUS at 19:01

## 2017-10-22 RX ADMIN — Medication 1 DROP(S): at 22:07

## 2017-10-22 RX ADMIN — Medication 1: at 13:46

## 2017-10-22 RX ADMIN — CLOPIDOGREL BISULFATE 75 MILLIGRAM(S): 75 TABLET, FILM COATED ORAL at 13:01

## 2017-10-22 RX ADMIN — PANTOPRAZOLE SODIUM 40 MILLIGRAM(S): 20 TABLET, DELAYED RELEASE ORAL at 06:47

## 2017-10-22 RX ADMIN — TAMSULOSIN HYDROCHLORIDE 0.4 MILLIGRAM(S): 0.4 CAPSULE ORAL at 22:08

## 2017-10-22 RX ADMIN — Medication 5 MILLIGRAM(S): at 22:09

## 2017-10-22 NOTE — PROGRESS NOTE ADULT - SUBJECTIVE AND OBJECTIVE BOX
Patient is a 72y old  Male who presents with a chief complaint of s/p cardiac cath (21 Oct 2017 15:35)    Being followed by ID for bacteremia  fever    Interval history:  Blood Cx also with CNS  Patient appears comfortable-awake but no verbal response  Does not follow commands  NO observed cough or diarrhea per RN      ROS:  Not obtainable       Antimicrobials:    piperacillin/tazobactam IVPB. 3.375 Gram(s) IV Intermittent every 12 hours      Vital Signs Last 24 Hrs  T(C): 37.4 (10-22-17 @ 04:55), Max: 37.4 (10-22-17 @ 04:55)  T(F): 99.4 (10-22-17 @ 04:55), Max: 99.4 (10-22-17 @ 04:55)  HR: 93 (10-22-17 @ 04:55) (66 - 97)  BP: 123/75 (10-22-17 @ 04:55) (117/74 - 137/79)  BP(mean): --  RR: 18 (10-22-17 @ 04:55) (18 - 18)  SpO2: 100% (10-22-17 @ 04:55) (96% - 100%)    Physical Exam:    Constitutional well preserved,comfortable,pleasant    HEENT PERRLA ,No pallor or icterus    No oral exudate or erythema    Neck supple no JVD or LN    Chest Good AE,CTA    R SC HD catheter no erythema or tenderness    CVS RRR S1 S2 WNl No murmur or rub or gallop    Abd soft BS normal No tenderness no masses    Ext No cyanosis clubbing or edema    IV site no erythema tenderness or discharge    Joints no swelling or LOM    Skin R forearm,thigh and left groin-fading macular rash  small sacral decub no purulence or discharge    CNS as above     Lab Data:                          10.0   6.6   )-----------( 214      ( 22 Oct 2017 06:29 )             31.2       10-22    140  |  98  |  47<H>  ----------------------------<  101<H>  3.9   |  26  |  3.86<H>    Ca    8.7      22 Oct 2017 06:29  Phos  8.0     10-21  Mg     2.3     10-21    TPro  6.8  /  Alb  3.0<L>  /  TBili  0.4  /  DBili  x   /  AST  31  /  ALT  38  /  AlkPhos  101  10-20        Culture - Blood (collected 21 Oct 2017 00:35)  Source: .Blood Blood-Peripheral  Gram Stain (22 Oct 2017 02:48):    Growth in aerobic bottle: Gram Positive Cocci in Clusters  Preliminary Report (22 Oct 2017 02:49):    Growth in aerobic bottle: Gram Positive Cocci in Clusters    ***Blood Panel PCR results on this specimen are available    approximately 3 hours after the Gram stain result.***    Gram stain, PCR, and/or culture results may not always    correspond due to difference in methodologies.    ************************************************************    This PCR assay was performed using [employer].    The following targets are tested for: Enterococcus,    vancomycin resistant enterococci, Listeria monocytogenes,    coagulase negative staphylococci, S. aureus,    methicillin resistant S. aureus, Streptococcus agalactiae    (Group B), S. pneumoniae, S. pyogenes (Group A),    Acinetobacter baumannii, Enterobacter cloacae, E. coli,    Klebsiella oxytoca, K. pneumoniae, Proteus sp.,    Serratia marcescens, Haemophilus influenzae,    Neisseria meningitidis, Pseudomonas aeruginosa, Candida    albicans, C. glabrata, C krusei, C parapsilosis,    C. tropicalis and the KPC resistance gene.  Organism: Blood Culture PCR (22 Oct 2017 04:20)  Organism: Blood Culture PCR (22 Oct 2017 04:20)      -  Coagulase negative Staphylococcus: Detec      Method Type: PCR    Culture - Blood (collected 21 Oct 2017 00:35)  Source: .Blood Blood-Peripheral  Gram Stain (21 Oct 2017 11:31):    Growth in aerobic bottle: Gram Variable Rods  Final Report (22 Oct 2017 09:26):    Growth in aerobic bottle: Bacillus species not anthracis    ***Blood Panel PCR results on this specimen are available    approximately 3 hours after the Gram stain result.***    Gram stain, PCR, and/or culture results may not always    correspond due to difference in methodologies.    ************************************************************    This PCR assay was performed using [employer].    The following targets are tested for: Enterococcus,    vancomycin resistant enterococci, Listeria monocytogenes,    coagulase negative staphylococci, S. aureus,    methicillin resistant S. aureus, Streptococcus agalactiae    (Group B), S. pneumoniae, S. pyogenes (Group A),    Acinetobacter baumannii, Enterobacter cloacae, E. coli,    Klebsiella oxytoca, K. pneumoniae, Proteus sp.,    Serratia marcescens, Haemophilus influenzae,    Neisseria meningitidis, Pseudomonas aeruginosa, Candida    albicans, C. glabrata, C krusei, C parapsilosis,    C. tropicalis and the KPC resistance gene.  Organism: Blood Culture PCR (22 Oct 2017 09:26)  Organism: Blood Culture PCR (22 Oct 2017 09:26)      -  Acinetobacter baumanii: Nondet      -  Candida albicans: Nondet      -  Candida glabrata: Nondet      -  Candida krusei: Nondet      -  Candida parapsilosis: Nondet      -  Candida tropicalis: Nondet      -  Coagulase negative Staphylococcus: Nondet      -  Enterobacter cloacae complex: Nondet      -  Enterococcus species: Nondet      -  Escherichia coli: Nondet      -  Haemophilus influenzae: Nondet      -  Klebsiella oxytoca: Nondet      -  Klebsiella pneumoniae: Nondet      -  Listeria monocytogenes: Nondet      -  Methicillin resistant Staphylococcus aureus (MRSA): Nondet      -  Multidrug (KPC pos) resistant organism: Nondet      -  Neisseria meningitidis: Nondet      -  Proteus species: Nondet      -  Pseudomonas aeruginosa: Nondet      -  Serratia marcescens: Nondet      -  Staphylococcus aureus: Nondet      -  Streptococcus agalactiae (Group B): Nondet      -  Streptococcus pneumoniae: Nondet      -  Streptococcus pyogenes (Group A): Nondet      -  Streptococcus sp. (Not Grp A, B or S pneumoniae): Nondet      -  Vancomycin resistant Enterococcus sp.: Nondet      Method Type: PCR

## 2017-10-22 NOTE — PROGRESS NOTE ADULT - PROBLEM SELECTOR PLAN 1
Prelim blood cultures with gram variable rods - patient with hypotension in HD.  ID called for consult, repeat blood culture - source can be sacral decub vs. urine  ?Occult abd foci,?Line related ? contaminant diptheroid  Patient afebrile, at baseline non verbal, patient also with diffuse maculopapular rash ? fungal. Rash was there prior to any administration of antibiotics  Per ID will c/w vancomycin 1g post HD and zosyn Prelim blood cultures with gram variable rods - patient with hypotension in HD.  ID called for consult, repeat blood culture - source can be sacral decub vs. urine  ?Occult abd foci,?Line related ? contaminant diptheroid  Patient afebrile, at baseline non verbal, patient also with diffuse maculopapular rash ? fungal. Rash was there prior to any administration of antibiotics  Per ID will c/w vancomycin 1g post HD and zosyn  Check CT A/P to eval occult cause

## 2017-10-22 NOTE — PROGRESS NOTE ADULT - PROBLEM SELECTOR PLAN 2
diffuse maculopapular rash, discussed with daughter - rash is new, no new medications at rehab, no new meds given in ED.  Rash improved today - monitor closely, if no improvement would consider derm on Monday

## 2017-10-22 NOTE — PROGRESS NOTE ADULT - PROBLEM SELECTOR PLAN 9
pt currently in sinus rhythm  monitor for episodes of AF on tele  INR elevated, holding coumadin tonight again and check inr in am Monday

## 2017-10-22 NOTE — PROGRESS NOTE ADULT - ASSESSMENT
71 yo with DM,ESRD recent HD,CVA ,non verbal.Recent bacteremia,pyelo s/p antimicrobials  Now with hypotension during HD  Exam with faint macular rash RUE,left flank and thigh but not on rest of body  No new recent antimicrobials or meds  Blood Cx with Gram variable rods in aerobic bottle,CNS   Abd exam benign,no observe cough/diarrhea  R SC HD catheter no erythema or tenderness  ?Occult abd foci,?Line related ? contaminant diptheroid.  rec:  1) repeat blood Cx  2) follow ID & sensi  3) Ct abd pelvis  4) Continue empiric vanco post HD and zosyn 3.375 gm IV q 12  5) observe rash-consider derm eval    Tailor plan per course,results.  case d/w med np  Will Follow.  Beeper [id number]-[id number]/afterhours/No response-[phone]

## 2017-10-22 NOTE — PROGRESS NOTE ADULT - ASSESSMENT
72 M hx ESRD on HD, CAD/PCI, T2DM, CVA with R sided weakness, COPD, stage 3 sacral decub a/w episode of hypotension in hemodialysis found with diffuse maculopapular rash and now blood cultures with gram variable rods

## 2017-10-22 NOTE — PROGRESS NOTE ADULT - PROBLEM SELECTOR PLAN 10
Patient with severe debility due to CVA, dementia - supportive care Patient with severe debility due to CVA, dementia - supportive care  Patient is DNR

## 2017-10-22 NOTE — PROGRESS NOTE ADULT - SUBJECTIVE AND OBJECTIVE BOX
Patient is a 72y old  Male who presents with a chief complaint of s/p cardiac cath (21 Oct 2017 15:35)      SUBJECTIVE / OVERNIGHT EVENTS: Patient seen and examined at bedside - patient laying in bed, opens eyes, wakes up but cannot follow directions.    ROS:  All other review of systems negative    Allergies    No Known Allergies    Intolerances        MEDICATIONS  (STANDING):  amiodarone    Tablet 200 milliGRAM(s) Oral daily  aspirin enteric coated 81 milliGRAM(s) Oral daily  atorvastatin 40 milliGRAM(s) Oral at bedtime  busPIRone 5 milliGRAM(s) Oral two times a day  clopidogrel Tablet 75 milliGRAM(s) Oral daily  clotrimazole/betamethasone Cream 1 Application(s) Topical two times a day  dextrose 5%. 1000 milliLiter(s) (50 mL/Hr) IV Continuous <Continuous>  dextrose 50% Injectable 12.5 Gram(s) IV Push once  dextrose 50% Injectable 25 Gram(s) IV Push once  dextrose 50% Injectable 25 Gram(s) IV Push once  fluticasone propionate   110 MICROgram(s) HFA Inhaler 1 Puff(s) Inhalation daily  insulin lispro (HumaLOG) corrective regimen sliding scale   SubCutaneous three times a day before meals  pantoprazole    Tablet 40 milliGRAM(s) Oral before breakfast  piperacillin/tazobactam IVPB. 3.375 Gram(s) IV Intermittent every 12 hours  sevelamer carbonate Powder 1600 milliGRAM(s) Oral three times a day with meals  tamsulosin 0.4 milliGRAM(s) Oral at bedtime    MEDICATIONS  (PRN):  ALBUTerol    90 MICROgram(s) HFA Inhaler 2 Puff(s) Inhalation every 6 hours PRN Shortness of Breath and/or Wheezing  dextrose Gel 1 Dose(s) Oral once PRN Blood Glucose LESS THAN 70 milliGRAM(s)/deciliter  glucagon  Injectable 1 milliGRAM(s) IntraMuscular once PRN Glucose LESS THAN 70 milligrams/deciliter      Vital Signs Last 24 Hrs  T(C): 37.4 (22 Oct 2017 04:55), Max: 37.4 (22 Oct 2017 04:55)  T(F): 99.4 (22 Oct 2017 04:55), Max: 99.4 (22 Oct 2017 04:55)  HR: 93 (22 Oct 2017 04:55) (66 - 97)  BP: 123/75 (22 Oct 2017 04:55) (117/74 - 137/79)  BP(mean): --  RR: 18 (22 Oct 2017 04:55) (18 - 18)  SpO2: 100% (22 Oct 2017 04:55) (96% - 100%)  CAPILLARY BLOOD GLUCOSE      POCT Blood Glucose.: 113 mg/dL (22 Oct 2017 08:57)  POCT Blood Glucose.: 97 mg/dL (21 Oct 2017 22:31)  POCT Blood Glucose.: 138 mg/dL (21 Oct 2017 17:31)  POCT Blood Glucose.: 162 mg/dL (21 Oct 2017 11:37)    I&O's Summary    21 Oct 2017 07:01  -  22 Oct 2017 07:00  --------------------------------------------------------  IN: 1250 mL / OUT: 900 mL / NET: 350 mL        PHYSICAL EXAM:  GENERAL: NAD, well-developed  HEAD:  Atraumatic, Normocephalic  EYES: EOMI, PERRLA, conjunctiva and sclera clear  NECK: Supple, No JVD  CHEST/LUNG: Clear to auscultation bilaterally; No wheeze  HEART: Regular rate and rhythm; No murmurs, rubs, or gallops  ABDOMEN: Soft, Nontender, Nondistended; Bowel sounds present  EXTREMITIES:  2+ Peripheral Pulses, No clubbing, cyanosis, or edema  NEUROLOGY: AAOx0, non-focal, nonverbal  PSYCH: calm  SKIN: Rash maculpapular diffuse improved, stage 3 sacral decub    LABS:                        10.0   6.6   )-----------( 214      ( 22 Oct 2017 06:29 )             31.2     10-22    140  |  98  |  47<H>  ----------------------------<  101<H>  3.9   |  26  |  3.86<H>    Ca    8.7      22 Oct 2017 06:29  Phos  8.0     10-21  Mg     2.3     10-21    TPro  6.8  /  Alb  3.0<L>  /  TBili  0.4  /  DBili  x   /  AST  31  /  ALT  38  /  AlkPhos  101  10-20    PT/INR - ( 22 Oct 2017 06:29 )   PT: 32.2 sec;   INR: 2.92 ratio         PTT - ( 20 Oct 2017 21:04 )  PTT:40.4 sec  CARDIAC MARKERS ( 21 Oct 2017 12:00 )  x     / 0.41 ng/mL / 203 U/L / x     / 5.9 ng/mL  CARDIAC MARKERS ( 21 Oct 2017 06:09 )  x     / 0.43 ng/mL / 176 U/L / x     / x      CARDIAC MARKERS ( 20 Oct 2017 21:00 )  x     / 0.43 ng/mL / x     / x     / 3.7 ng/mL        Telemetery - sinus 80-90    Consultant(s) Notes Reviewed:  ID - 1) repeat blood Cx 2) follow ID & sensi  3) Ct abd pelvis  4) Continue empiric vanco post HD and zosyn 3.375 gm IV q 12  5) observe rash-consider derm eval    Goals of Care: DNR

## 2017-10-22 NOTE — PROGRESS NOTE ADULT - PROBLEM SELECTOR PLAN 4
though pt has depressed t's on ekg trop is dramatically lower than last troponin  will monitor on telemetry  cont asa/plavix

## 2017-10-23 LAB
ALBUMIN SERPL ELPH-MCNC: 3.2 G/DL — LOW (ref 3.3–5)
ALP SERPL-CCNC: 85 U/L — SIGNIFICANT CHANGE UP (ref 40–120)
ALT FLD-CCNC: 29 U/L RC — SIGNIFICANT CHANGE UP (ref 10–45)
ANION GAP SERPL CALC-SCNC: 19 MMOL/L — HIGH (ref 5–17)
APTT BLD: 33.9 SEC — SIGNIFICANT CHANGE UP (ref 27.5–37.4)
AST SERPL-CCNC: 27 U/L — SIGNIFICANT CHANGE UP (ref 10–40)
BILIRUB SERPL-MCNC: 0.5 MG/DL — SIGNIFICANT CHANGE UP (ref 0.2–1.2)
BUN SERPL-MCNC: 63 MG/DL — HIGH (ref 7–23)
CALCIUM SERPL-MCNC: 8.8 MG/DL — SIGNIFICANT CHANGE UP (ref 8.4–10.5)
CHLORIDE SERPL-SCNC: 97 MMOL/L — SIGNIFICANT CHANGE UP (ref 96–108)
CO2 SERPL-SCNC: 26 MMOL/L — SIGNIFICANT CHANGE UP (ref 22–31)
CREAT SERPL-MCNC: 5.62 MG/DL — HIGH (ref 0.5–1.3)
GLUCOSE BLDC GLUCOMTR-MCNC: 111 MG/DL — HIGH (ref 70–99)
GLUCOSE BLDC GLUCOMTR-MCNC: 119 MG/DL — HIGH (ref 70–99)
GLUCOSE BLDC GLUCOMTR-MCNC: 129 MG/DL — HIGH (ref 70–99)
GLUCOSE BLDC GLUCOMTR-MCNC: 144 MG/DL — HIGH (ref 70–99)
GLUCOSE SERPL-MCNC: 93 MG/DL — SIGNIFICANT CHANGE UP (ref 70–99)
HCT VFR BLD CALC: 31.3 % — LOW (ref 39–50)
HGB BLD-MCNC: 10.1 G/DL — LOW (ref 13–17)
INR BLD: 1.72 RATIO — HIGH (ref 0.88–1.16)
MCHC RBC-ENTMCNC: 29.3 PG — SIGNIFICANT CHANGE UP (ref 27–34)
MCHC RBC-ENTMCNC: 32.3 GM/DL — SIGNIFICANT CHANGE UP (ref 32–36)
MCV RBC AUTO: 90.9 FL — SIGNIFICANT CHANGE UP (ref 80–100)
PLATELET # BLD AUTO: 203 K/UL — SIGNIFICANT CHANGE UP (ref 150–400)
POTASSIUM SERPL-MCNC: 4.1 MMOL/L — SIGNIFICANT CHANGE UP (ref 3.5–5.3)
POTASSIUM SERPL-SCNC: 4.1 MMOL/L — SIGNIFICANT CHANGE UP (ref 3.5–5.3)
PROT SERPL-MCNC: 6.5 G/DL — SIGNIFICANT CHANGE UP (ref 6–8.3)
PROTHROM AB SERPL-ACNC: 19 SEC — HIGH (ref 9.8–12.7)
RBC # BLD: 3.44 M/UL — LOW (ref 4.2–5.8)
RBC # FLD: 13.6 % — SIGNIFICANT CHANGE UP (ref 10.3–14.5)
SODIUM SERPL-SCNC: 142 MMOL/L — SIGNIFICANT CHANGE UP (ref 135–145)
WBC # BLD: 6.3 K/UL — SIGNIFICANT CHANGE UP (ref 3.8–10.5)
WBC # FLD AUTO: 6.3 K/UL — SIGNIFICANT CHANGE UP (ref 3.8–10.5)

## 2017-10-23 PROCEDURE — 99232 SBSQ HOSP IP/OBS MODERATE 35: CPT

## 2017-10-23 PROCEDURE — 99233 SBSQ HOSP IP/OBS HIGH 50: CPT

## 2017-10-23 RX ORDER — PANTOPRAZOLE SODIUM 20 MG/1
40 TABLET, DELAYED RELEASE ORAL
Qty: 0 | Refills: 0 | Status: DISCONTINUED | OUTPATIENT
Start: 2017-10-23 | End: 2017-10-25

## 2017-10-23 RX ORDER — WARFARIN SODIUM 2.5 MG/1
3 TABLET ORAL ONCE
Qty: 0 | Refills: 0 | Status: COMPLETED | OUTPATIENT
Start: 2017-10-23 | End: 2017-10-23

## 2017-10-23 RX ADMIN — PIPERACILLIN AND TAZOBACTAM 25 GRAM(S): 4; .5 INJECTION, POWDER, LYOPHILIZED, FOR SOLUTION INTRAVENOUS at 05:40

## 2017-10-23 RX ADMIN — Medication 1 PUFF(S): at 13:52

## 2017-10-23 RX ADMIN — AMIODARONE HYDROCHLORIDE 200 MILLIGRAM(S): 400 TABLET ORAL at 05:39

## 2017-10-23 RX ADMIN — SEVELAMER CARBONATE 1600 MILLIGRAM(S): 2400 POWDER, FOR SUSPENSION ORAL at 09:19

## 2017-10-23 RX ADMIN — SEVELAMER CARBONATE 1600 MILLIGRAM(S): 2400 POWDER, FOR SUSPENSION ORAL at 13:52

## 2017-10-23 RX ADMIN — TAMSULOSIN HYDROCHLORIDE 0.4 MILLIGRAM(S): 0.4 CAPSULE ORAL at 22:22

## 2017-10-23 RX ADMIN — Medication 1 DROP(S): at 13:49

## 2017-10-23 RX ADMIN — Medication 1 DROP(S): at 18:31

## 2017-10-23 RX ADMIN — SEVELAMER CARBONATE 1600 MILLIGRAM(S): 2400 POWDER, FOR SUSPENSION ORAL at 18:34

## 2017-10-23 RX ADMIN — PANTOPRAZOLE SODIUM 40 MILLIGRAM(S): 20 TABLET, DELAYED RELEASE ORAL at 05:47

## 2017-10-23 RX ADMIN — ATORVASTATIN CALCIUM 40 MILLIGRAM(S): 80 TABLET, FILM COATED ORAL at 22:22

## 2017-10-23 RX ADMIN — WARFARIN SODIUM 3 MILLIGRAM(S): 2.5 TABLET ORAL at 22:22

## 2017-10-23 RX ADMIN — Medication 5 MILLIGRAM(S): at 18:32

## 2017-10-23 RX ADMIN — CLOPIDOGREL BISULFATE 75 MILLIGRAM(S): 75 TABLET, FILM COATED ORAL at 13:50

## 2017-10-23 RX ADMIN — Medication 5 MILLIGRAM(S): at 05:39

## 2017-10-23 RX ADMIN — CLOTRIMAZOLE AND BETAMETHASONE DIPROPIONATE 1 APPLICATION(S): 10; .5 CREAM TOPICAL at 18:38

## 2017-10-23 RX ADMIN — Medication 1 DROP(S): at 05:39

## 2017-10-23 RX ADMIN — Medication 81 MILLIGRAM(S): at 13:51

## 2017-10-23 RX ADMIN — CLOTRIMAZOLE AND BETAMETHASONE DIPROPIONATE 1 APPLICATION(S): 10; .5 CREAM TOPICAL at 05:39

## 2017-10-23 RX ADMIN — Medication 1 DROP(S): at 23:21

## 2017-10-23 NOTE — PROGRESS NOTE ADULT - SUBJECTIVE AND OBJECTIVE BOX
Patient is a 72y old  Male who presents with a chief complaint of s/p cardiac cath (21 Oct 2017 15:35)    Being followed by ID for bacteremia    Interval history:More awake  Brothera t bedside-opens eyes  No other acute events      ROS:  Not obtainable    Antimicrobials:    DCed today    Vital Signs Last 24 Hrs  T(C): 36.7 (10-23-17 @ 04:58), Max: 37.2 (10-22-17 @ 20:44)  T(F): 98.1 (10-23-17 @ 04:58), Max: 99 (10-22-17 @ 20:44)  HR: 80 (10-23-17 @ 04:58) (80 - 90)  BP: 123/77 (10-23-17 @ 04:58) (118/75 - 123/77)  BP(mean): --  RR: 18 (10-23-17 @ 04:58) (18 - 18)  SpO2: 100% (10-23-17 @ 04:58) (98% - 100%)    Physical Exam:    comfortable,    HEENT PERRLA EOMI,No pallor or icterus    No oral exudate or erythema    R SC HD catheter no erythema or tenderness    Neck supple no JVD or LN    Chest Good AE,bibasilar crackles    CVS RRR S1 S2 WNl No murmur or rub or gallop    Abd soft BS normal No tenderness no masses  PEG    Ext rash on right arm and groin faded    IV site no erythema tenderness or discharge    Joints no swelling or LOM    CNS  as above   Lab Data:                          10.1   6.3   )-----------( 203      ( 23 Oct 2017 06:10 )             31.3       10-23    142  |  97  |  63<H>  ----------------------------<  93  4.1   |  26  |  5.62<H>    Ca    8.8      23 Oct 2017 06:10    TPro  6.5  /  Alb  3.2<L>  /  TBili  0.5  /  DBili  x   /  AST  27  /  ALT  29  /  AlkPhos  85  10-23        Culture - Blood (collected 22 Oct 2017 09:45)  Source: .Blood Blood  Preliminary Report (23 Oct 2017 10:01):    No growth to date.    Culture - Blood (collected 22 Oct 2017 09:45)  Source: .Blood Blood  Preliminary Report (23 Oct 2017 10:01):    No growth to date.    Culture - Blood (collected 22 Oct 2017 05:02)  Source: .Blood Blood-Peripheral  Preliminary Report (23 Oct 2017 06:00):    No growth to date.    Culture - Blood (collected 22 Oct 2017 05:02)  Source: .Blood Blood-Peripheral  Preliminary Report (23 Oct 2017 06:00):    No growth to date.    Culture - Blood (collected 21 Oct 2017 00:35)  Source: .Blood Blood-Peripheral  Gram Stain (22 Oct 2017 02:48):    Growth in aerobic bottle: Gram Positive Cocci in Clusters  Final Report (22 Oct 2017 19:34):    Growth in aerobic bottle: Coag Negative Staphylococcus    Single set isolate, possible contaminant. Contact    Microbiology if susceptibility testing clinically          Culture - Blood (collected 21 Oct 2017 00:35)  Source: .Blood Blood-Peripheral  Gram Stain (21 Oct 2017 11:31):    Growth in aerobic bottle: Gram Variable Rods  Final Report (22 Oct 2017 09:26):    Growth in aerobic bottle: Bacillus species not anthracis        < from: CT Abdomen and Pelvis No Cont (10.22.17 @ 11:45) >  IMPRESSION: No intra-abdominal source for bacteremia demonstrated.    Nonobstructing renal calculi and bladder calculi.      < from: Xray Chest 1 View AP- PORTABLE-Urgent (10.20.17 @ 21:47) >  IMPRESSION:    No acute disease.             < end of copied text >          < end of copied text >

## 2017-10-23 NOTE — PROGRESS NOTE ADULT - PROBLEM SELECTOR PLAN 6
breo not on formulary  cont fluticasone  cont albuterol prn breo not on formulary  - c/w fluticasone  - c/w albuterol prn

## 2017-10-23 NOTE — CONSULT NOTE ADULT - ATTENDING COMMENTS
Patient seen and examined  Above verified   Multiple comorbidities as above  recent bacteremia,pyelo s/p antimicrobials  Now with hypotension during HD  No ROS available.  Exam with faint macular rash RUE,left flank and thigh but not on rest of body  No new recent antimicrobials or meds  Blood Cx with Gram variable rods in aerobic bottle  Abd exam benign,no observe cough/diarrhea  R SC HD catheter no erythema or tenderness  ?Occult abd foci,?Line related ? contaminant diptheroid.  rec:  1) repeat blood Cx  2) follow ID & sensi  30 Ct abd pelvis  4) empiric vanco x 1 and zosyn 3.375 gm IV q 12  5) observe rash-consider derm eval  ?topical clotrimazole  Tailor plan per course,results.  case d/w med np  Will Follow.  Beeper [id number]-tblx/afterhours/No response-[id number]
Awake , but non responsive,  71 yo diabetic male , non ambulatory, with a horse shoe shaped Stage 3 wound of sacrum and buttocks.  Wound care provided, and orders d/w staff.

## 2017-10-23 NOTE — PROGRESS NOTE ADULT - ASSESSMENT
71 yo with DM,ESRD recent HD,CVA ,non verbal.Recent bacteremia,pyelo s/p antimicrobials  Now with hypotension during HD  Exam with faint macular rash which has resolved  Blood Cx-CNS and Bacillus-not sustained   Repeat Cx negative  CT abd pelvis and CXR negative  Clinically stable  No fevers  ?Contaminant  Less likley HD cath related BSI  Given this would favor stopping antimicrobials and observing  Surveillance blood Cx in 24-48 hrs  Tailor plan per course,results.  case d/w Med NP  Will Follow.  Beeper [id number]-kovx/afterhours/No response-[id number]

## 2017-10-23 NOTE — PROGRESS NOTE ADULT - PROBLEM SELECTOR PLAN 9
pt currently in sinus rhythm  monitor for episodes of AF on tele  INR elevated, holding coumadin tonight again and check inr in am Monday pt currently in sinus rhythm  monitor for episodes of AF on tele  INR now subtherapeutic, will dose Coumadin

## 2017-10-23 NOTE — PROGRESS NOTE ADULT - SUBJECTIVE AND OBJECTIVE BOX
Resting, non-verbal    Vital Signs Last 24 Hrs  T(C): 36.3 (10-23-17 @ 12:35), Max: 37.2 (10-22-17 @ 20:44)  T(F): 97.4 (10-23-17 @ 12:35), Max: 99 (10-22-17 @ 20:44)  HR: 80 (10-23-17 @ 12:35) (80 - 90)  BP: 115/70 (10-23-17 @ 12:35) (115/70 - 123/77)  BP(mean): --  RR: 18 (10-23-17 @ 12:35) (18 - 18)  SpO2: 98% (10-23-17 @ 12:35) (98% - 100%)    Respiratory: b/l air entry, clear  Cardiovascular: S1 S2 regular  Gastrointestinal: soft, NT, BS present  Extremities: no edema     ALBUTerol    90 MICROgram(s) HFA Inhaler 2 Puff(s) Inhalation every 6 hours PRN  amiodarone    Tablet 200 milliGRAM(s) Oral daily  artificial  tears Solution 1 Drop(s) Both EYES four times a day  aspirin enteric coated 81 milliGRAM(s) Oral daily  atorvastatin 40 milliGRAM(s) Oral at bedtime  busPIRone 5 milliGRAM(s) Oral two times a day  clopidogrel Tablet 75 milliGRAM(s) Oral daily  clotrimazole/betamethasone Cream 1 Application(s) Topical two times a day  dextrose 5%. 1000 milliLiter(s) IV Continuous <Continuous>  dextrose 50% Injectable 12.5 Gram(s) IV Push once  dextrose 50% Injectable 25 Gram(s) IV Push once  dextrose 50% Injectable 25 Gram(s) IV Push once  dextrose Gel 1 Dose(s) Oral once PRN  fluticasone propionate   110 MICROgram(s) HFA Inhaler 1 Puff(s) Inhalation daily  glucagon  Injectable 1 milliGRAM(s) IntraMuscular once PRN  insulin lispro (HumaLOG) corrective regimen sliding scale   SubCutaneous three times a day before meals  pantoprazole   Suspension 40 milliGRAM(s) Oral before breakfast  sevelamer carbonate Powder 1600 milliGRAM(s) Oral three times a day with meals  tamsulosin 0.4 milliGRAM(s) Oral at bedtime                          10.1   6.3   )-----------( 203      ( 23 Oct 2017 06:10 )             31.3     23 Oct 2017 06:10    142    |  97     |  63     ----------------------------<  93     4.1     |  26     |  5.62     Ca    8.8        23 Oct 2017 06:10    TPro  6.5    /  Alb  3.2    /  TBili  0.5    /  DBili  x      /  AST  27     /  ALT  29     /  AlkPhos  85     23 Oct 2017 06:10    LIVER FUNCTIONS - ( 23 Oct 2017 06:10 )  Alb: 3.2 g/dL / Pro: 6.5 g/dL / ALK PHOS: 85 U/L / ALT: 29 U/L RC / AST: 27 U/L / GGT: x           PT/INR - ( 23 Oct 2017 06:11 )   PT: 19.0 sec;   INR: 1.72 ratio      Assessment and Recommendation:   · Assessment		  72 year old male with PMH of DM, HTN, HLD, Embolic CVA with right hemiplegia, COPD, bipolar, bilateral nephrolithiasis and bladder stones, ASHD, discharged 10/18 after long hospital stay when initially presented with NSTEMI/pulm edema, UTI, VT s/p shock, embolic CVA, new AF, re-admitted with bacteremia.   Abx per ID  Will f/u repeat bld cx  ESRD, HD TTS  TMP as able

## 2017-10-23 NOTE — PROGRESS NOTE ADULT - ASSESSMENT
72 M hx ESRD on HD, CAD/PCI, T2DM, CVA with R sided weakness, COPD, stage 3 sacral decub a/w episode of hypotension in hemodialysis found with diffuse maculopapular rash and now blood cultures with gram variable rods 72 year old male with PMH of DM, HTN, HLD, Embolic CVA with right hemiplegia, COPD, bipolar, bilateral nephrolithiasis and bladder stones, ASHD, discharged 10/18 after long hospital stay when initially presented with NSTEMI/pulm edema, UTI, VT s/p shock, embolic CVA, new AF, re-admitted after hypotensive episode in dialysis found with bacteremia x2 likely contaminants

## 2017-10-23 NOTE — CHART NOTE - NSCHARTNOTEFT_GEN_A_CORE
Patient known to palliative care from recent admissions. Discussed with primary team- at the moment, goals of care are delineated. Should new needs arise, please call palliative care for official consult.

## 2017-10-23 NOTE — PROGRESS NOTE ADULT - SUBJECTIVE AND OBJECTIVE BOX
TE[employer] 4 Medicine Intern: Oscar Lorenzo  Spectralink: [id number]  Pager: [phone]    HPI/INTERVAL HISTORY: Overnight, no acute events. Patient seen and examined at bedside this AM. Patient unresponsive at baseline. Alert and awake for some portions of exam.    OBJECTIVE:  VITAL SIGNS:  ICU Vital Signs Last 24 Hrs  T(C): 36.7 (23 Oct 2017 04:58), Max: 37.2 (22 Oct 2017 20:44)  T(F): 98.1 (23 Oct 2017 04:58), Max: 99 (22 Oct 2017 20:44)  HR: 80 (23 Oct 2017 04:58) (80 - 90)  BP: 123/77 (23 Oct 2017 04:58) (118/75 - 123/77)  BP(mean): --  ABP: --  ABP(mean): --  RR: 18 (23 Oct 2017 04:58) (18 - 18)  SpO2: 100% (23 Oct 2017 04:58) (98% - 100%)        10-22 @ 07:01  -  10-23 @ 07:00  --------------------------------------------------------  IN: 390 mL / OUT: 0 mL / NET: 390 mL      CAPILLARY BLOOD GLUCOSE      POCT Blood Glucose.: 129 mg/dL (23 Oct 2017 12:28)      PHYSICAL EXAM:  GENERAL: NAD, well-developed  HEAD:  Atraumatic, Normocephalic  EYES: conjunctiva and sclera clear  NECK: Supple, No JVD  CHEST/LUNG: Clear to auscultation bilaterally; No wheeze  HEART: Regular rate and rhythm; No murmurs, rubs, or gallops  ABDOMEN: Soft, Nontender, Nondistended; Bowel sounds present  EXTREMITIES:  2+ Peripheral Pulses, No clubbing, cyanosis, or edema  PSYCH: awake and alert, not following commands  SKIN: small area of maculopapualr rash around R axilla, but seems resolved. sacral decubitus stage 3 ulcer present, permacath site in R anterior chest wall in place    LABS:                        10.1   6.3   )-----------( 203      ( 23 Oct 2017 06:10 )             31.3     10-23    142  |  97  |  63<H>  ----------------------------<  93  4.1   |  26  |  5.62<H>    Ca    8.8      23 Oct 2017 06:10    TPro  6.5  /  Alb  3.2<L>  /  TBili  0.5  /  DBili  x   /  AST  27  /  ALT  29  /  AlkPhos  85  10-23    LIVER FUNCTIONS - ( 23 Oct 2017 06:10 )  Alb: 3.2 g/dL / Pro: 6.5 g/dL / ALK PHOS: 85 U/L / ALT: 29 U/L RC / AST: 27 U/L / GGT: x           PT/INR - ( 23 Oct 2017 06:11 )   PT: 19.0 sec;   INR: 1.72 ratio         PTT - ( 23 Oct 2017 06:11 )  PTT:33.9 sec          RADIOLOGY & ADDITIONAL TESTS: Reviewed.    ALBUTerol    90 MICROgram(s) HFA Inhaler 2 Puff(s) Inhalation every 6 hours PRN  amiodarone    Tablet 200 milliGRAM(s) Oral daily  artificial  tears Solution 1 Drop(s) Both EYES four times a day  aspirin enteric coated 81 milliGRAM(s) Oral daily  atorvastatin 40 milliGRAM(s) Oral at bedtime  busPIRone 5 milliGRAM(s) Oral two times a day  clopidogrel Tablet 75 milliGRAM(s) Oral daily  clotrimazole/betamethasone Cream 1 Application(s) Topical two times a day  dextrose 5%. 1000 milliLiter(s) IV Continuous <Continuous>  dextrose 50% Injectable 12.5 Gram(s) IV Push once  dextrose 50% Injectable 25 Gram(s) IV Push once  dextrose 50% Injectable 25 Gram(s) IV Push once  dextrose Gel 1 Dose(s) Oral once PRN  fluticasone propionate   110 MICROgram(s) HFA Inhaler 1 Puff(s) Inhalation daily  glucagon  Injectable 1 milliGRAM(s) IntraMuscular once PRN  insulin lispro (HumaLOG) corrective regimen sliding scale   SubCutaneous three times a day before meals  pantoprazole   Suspension 40 milliGRAM(s) Oral before breakfast  sevelamer carbonate Powder 1600 milliGRAM(s) Oral three times a day with meals  tamsulosin 0.4 milliGRAM(s) Oral at bedtime      No Known Allergies

## 2017-10-23 NOTE — PROGRESS NOTE ADULT - PROBLEM SELECTOR PLAN 1
Prelim blood cultures with gram variable rods - patient with hypotension in HD.  ID called for consult, repeat blood culture - source can be sacral decub vs. urine  ?Occult abd foci,?Line related ? contaminant diptheroid  Patient afebrile, at baseline non verbal, patient also with diffuse maculopapular rash ? fungal. Rash was there prior to any administration of antibiotics  Per ID will c/w vancomycin 1g post HD and zosyn  Check CT A/P to eval occult cause Prelim blood cultures with bacillus/coag negative staph likely contaminants- patient with hypotension in HD.  ID called for consult, repeat blood culture - source can be sacral decub vs. urine  - maculopapular resolving  - CT A/P negative  Per ID will c/w vancomycin 1g post HD and zosyn Prelim blood cultures with bacillus/coag negative staph likely contaminants- patient with hypotension in HD.  ID called for consult, repeat blood culture - source can be sacral decub vs. urine  - maculopapular resolving  - CT A/P negative, CXR negative  Per ID will d/c antibiotics and observe

## 2017-10-23 NOTE — CONSULT NOTE ADULT - SUBJECTIVE AND OBJECTIVE BOX
Wound SURGERY CONSULT NOTE    HPI:  73 yo M PMH CAD s/p 12-14 stents placed 6273-7251, T2 DM. HTN HLD, w/ residual R sided facial weakness, radiculopathy with herniated disc, COPD, CKD Cr 2 months ago 1.2 on dialysis MWF, bipolar (no meds followed by psychiatry), chronic kidney stones and has had urethral tents in the past presents from rehab facility for episode of hypotension to SBP 90s while at dialysis today.   Pt was discharged 10/18 after long hospital stay when initially presented with NSTEMI/pulm edema and transferred to [facility] 9/12 for LHC + BABAR x 3 + ARF on CKD +HD, + UTI on CTX.  Pt declined HD at one point, rising K, did undergo HD and later afterwards + unresponsive, CODE BLUE x 20 min with ROSC c/b VT s/p shock/amio load c/b CVA (r.weakness + embolic CVAs on CT), + new AF + heparin gtt c/b groin hematoma s/p txn, new CHF EF 35%, + palliative care discussions + dnr/dni.  Planned to cont HD via new r.permcat.  dysphagia treated with dysphagia 1 diet with honey liquids, PPM declined by family and sent to rehab.  Pt sbps low 100s to 110s.  on discharge trop 7.64    Spoke with daughter Anisa Lara [phone].  Pt has been poorly responsive since CVA.  Reportedly pt can grimace or shake head yes/no to some questions.  Has been eating/drinking and tolerated regular food yesterday.  Pt went to first HD yesterday and became hypotensive but daughter had not noted any change in his behavior or cognition.  Confirmed dnr/dni.    In ED 99/62, hr 78, rr 18, temp 98.3, sat 100  no interventions done. (21 Oct 2017 00:52)      PAST MEDICAL & SURGICAL HISTORY:  CVA (cerebral vascular accident)  COPD (chronic obstructive pulmonary disease)  BPH (benign prostatic hyperplasia)  Bipolar affective disorder  CKD (chronic kidney disease)  Pancreatitis  Hypertension  Dyslipidemia  Diabetes mellitus  Coronary artery disease  HLD (hyperlipidemia)  Anemia  Acute renal failure  CKD (chronic kidney disease)  COPD (chronic obstructive pulmonary disease)  Fainting  Cardiac arrhythmia  Dizziness  Hydronephrosis  Cholecystitis  Bipolar 1 disorder  DM (diabetes mellitus)  HTN (hypertension)  Lumbar radiculopathy  Left heart failure  Reflux esophagitis  Coronary atherosclerosis  History of cardiac catheterization  No significant past surgical history      REVIEW OF SYSTEMS      General:	    Skin/Breast:  	  Ophthalmologic:  	  ENMT:	    Respiratory and Thorax:  	  Cardiovascular:	    Gastrointestinal:	    Genitourinary:	    Musculoskeletal:	    Neurological:	    Psychiatric:	    Hematology/Lymphatics:	    Endocrine:	    Allergic/Immunologic:	    MEDICATIONS  (STANDING):  amiodarone    Tablet 200 milliGRAM(s) Oral daily  artificial  tears Solution 1 Drop(s) Both EYES four times a day  aspirin enteric coated 81 milliGRAM(s) Oral daily  atorvastatin 40 milliGRAM(s) Oral at bedtime  busPIRone 5 milliGRAM(s) Oral two times a day  clopidogrel Tablet 75 milliGRAM(s) Oral daily  clotrimazole/betamethasone Cream 1 Application(s) Topical two times a day  dextrose 5%. 1000 milliLiter(s) (50 mL/Hr) IV Continuous <Continuous>  dextrose 50% Injectable 12.5 Gram(s) IV Push once  dextrose 50% Injectable 25 Gram(s) IV Push once  dextrose 50% Injectable 25 Gram(s) IV Push once  fluticasone propionate   110 MICROgram(s) HFA Inhaler 1 Puff(s) Inhalation daily  insulin lispro (HumaLOG) corrective regimen sliding scale   SubCutaneous three times a day before meals  pantoprazole   Suspension 40 milliGRAM(s) Oral before breakfast  piperacillin/tazobactam IVPB. 3.375 Gram(s) IV Intermittent every 12 hours  sevelamer carbonate Powder 1600 milliGRAM(s) Oral three times a day with meals  tamsulosin 0.4 milliGRAM(s) Oral at bedtime  vancomycin  IVPB 1000 milliGRAM(s) IV Intermittent <User Schedule>    MEDICATIONS  (PRN):  ALBUTerol    90 MICROgram(s) HFA Inhaler 2 Puff(s) Inhalation every 6 hours PRN Shortness of Breath and/or Wheezing  dextrose Gel 1 Dose(s) Oral once PRN Blood Glucose LESS THAN 70 milliGRAM(s)/deciliter  glucagon  Injectable 1 milliGRAM(s) IntraMuscular once PRN Glucose LESS THAN 70 milligrams/deciliter      Allergies    No Known Allergies    Intolerances        SOCIAL HISTORY:  / single/ ; (+)HHA; Denies smoking, ETOH, drugs    FAMILY HISTORY:  No pertinent family history in first degree relatives      Vital Signs Last 24 Hrs  T(C): 36.7 (23 Oct 2017 04:58), Max: 37.2 (22 Oct 2017 20:44)  T(F): 98.1 (23 Oct 2017 04:58), Max: 99 (22 Oct 2017 20:44)  HR: 80 (23 Oct 2017 04:58) (80 - 90)  BP: 123/77 (23 Oct 2017 04:58) (109/63 - 123/77)  BP(mean): --  RR: 18 (23 Oct 2017 04:58) (18 - 18)  SpO2: 100% (23 Oct 2017 04:58) (98% - 100%)    NAD / gaurded but stable,  A&Ox3/ Alert  cachectic/ MO/ Obese  WD/ WN/ WG  Total Care Sport/ Versa Care P500 bed    Cardiovascular: RRR (+)m    Respiratory: CTA    Gastrointestinal soft NT/ND (+)BS    Neurology  weakened strength & sensation grossly intact/ paraesthesia  nonverbal, no follow commands    Musculoskeletal/Vascular:  Rt side hemiparesthesis  Passive ROM Lt sided   No edema, BLE equally warm  no deformities/ contractures- RU/LE  w/ splints in place    Skin:  moist w/ good turgor  frail,  ecchymosis w/o hematoma  Sacral probable Stage III pressure ulcer w/ evolving DTI   4cm x 2cm x 0.2cm   serosanguinous drainage  No odor, erythema, increased warmth, tenderness, induration, fluctuance    LABS:                        10.1   6.3   )-----------( 203      ( 23 Oct 2017 06:10 )             31.3     10-23    142  |  97  |  63<H>  ----------------------------<  93  4.1   |  26  |  5.62<H>    Ca    8.8      23 Oct 2017 06:10    TPro  6.5  /  Alb  3.2<L>  /  TBili  0.5  /  DBili  x   /  AST  27  /  ALT  29  /  AlkPhos  85  10-23    PT/INR - ( 23 Oct 2017 06:11 )   PT: 19.0 sec;   INR: 1.72 ratio         PTT - ( 23 Oct 2017 06:11 )  PTT:33.9 sec      Albumin, Serum: 3.2 g/dL (10-23 @ 06:10)  Albumin, Serum: 3.0 g/dL (10-20 @ 21:00)      HgA1c  09-13 @ 10:32  6.5    RADIOLOGY & ADDITIONAL STUDIES:  Cultures:    A/P:    con't Nutrition (as tolerated)  con't Offloading   con't Pericare  Care as per medicine will follow w/ you  [facility] [address] [place] [address] [phone]  Seen w/ attng and D/w team  Thank you for this consult  Kenisha Chicas PA-C CWS [id number] Wound SURGERY CONSULT NOTE    HPI:  71 yo M PMH CAD s/p 12-14 stents placed [id number], T2 DM. HTN HLD, CVA w/ residual R sided facial weakness, radiculopathy with herniated disc, COPD, CKD on dialysis MWF, bipolar (no meds followed by psychiatry), chronic kidney stones and s/p had urethral stents in the past presents from rehab facility for episode of hypotension to SBP 90s while at dialysis.   Pt was discharged 10/18 after long hospital stay when initially presented with NSTEMI/pulm edema and transferred to [facility] 9/12 for LHC + BABAR x 3 + ARF on CKD +HD, + UTI on CTX.  Pt declined HD at one point, rising K, did undergo HD and later afterwards + unresponsive, CODE BLUE x 20 min with ROSC c/b VT s/p shock/amio load c/b CVA (r.weakness + embolic CVAs on CT), + new AF + heparin gtt c/b groin hematoma s/p txn, new CHF EF 35%, + palliative care discussions + dnr/dni.  Planned to cont HD via new r.permcath.  dysphagia treated with dysphagia 1 diet with honey liquids, PPM declined by family and sent to rehab.  Pt sbps low 100s to 110s.  on discharge trop 7.64    Spoke with daughter Anisa Lara [phone].  Pt has been poorly responsive since CVA.  Reportedly pt can grimace or shake head yes/no to some questions.  Has been eating/drinking and tolerated regular food yesterday.  daughter had not noted any change in his behavior or cognition w/ episode of hypotn.  Confirmed dnr/dni.    Wound consult requested to help manage sacral wound.  Foam dressing placed while awaiting consult.  No fevers, redness, warmth, or drainage noted.  (+) incontinent & sedentary.  Off loading and pericare protocols initiated upon admission.      PAST MEDICAL & SURGICAL HISTORY:  CVA (cerebral vascular accident)  COPD (chronic obstructive pulmonary disease)  BPH (benign prostatic hyperplasia)  Bipolar affective disorder  CKD (chronic kidney disease) s/p AVF /permacath on HD  Pancreatitis  Hypertension  Diabetes mellitus  Coronary artery disease s/p  cardiac catheterization  HLD (hyperlipidemia)  Anemia  Fainting/ Dizziness  Cardiac arrhythmia  Hydronephrosis  Cholecystitis  Bipolar 1 disorder  Lumbar radiculopathy  Left heart failure  Reflux esophagitis    No significant past surgical history      REVIEW OF SYSTEMS  Pt unable to offer    MEDICATIONS  (STANDING):  amiodarone    Tablet 200 milliGRAM(s) Oral daily  artificial  tears Solution 1 Drop(s) Both EYES four times a day  aspirin enteric coated 81 milliGRAM(s) Oral daily  atorvastatin 40 milliGRAM(s) Oral at bedtime  busPIRone 5 milliGRAM(s) Oral two times a day  clopidogrel Tablet 75 milliGRAM(s) Oral daily  clotrimazole/betamethasone Cream 1 Application(s) Topical two times a day  dextrose 5%. 1000 milliLiter(s) (50 mL/Hr) IV Continuous <Continuous>  dextrose 50% Injectable 12.5 Gram(s) IV Push once  dextrose 50% Injectable 25 Gram(s) IV Push once  dextrose 50% Injectable 25 Gram(s) IV Push once  fluticasone propionate   110 MICROgram(s) HFA Inhaler 1 Puff(s) Inhalation daily  insulin lispro (HumaLOG) corrective regimen sliding scale   SubCutaneous three times a day before meals  pantoprazole   Suspension 40 milliGRAM(s) Oral before breakfast  piperacillin/tazobactam IVPB. 3.375 Gram(s) IV Intermittent every 12 hours  sevelamer carbonate Powder 1600 milliGRAM(s) Oral three times a day with meals  tamsulosin 0.4 milliGRAM(s) Oral at bedtime  vancomycin  IVPB 1000 milliGRAM(s) IV Intermittent <User Schedule>    MEDICATIONS  (PRN):  ALBUTerol    90 MICROgram(s) HFA Inhaler 2 Puff(s) Inhalation every 6 hours PRN Shortness of Breath and/or Wheezing  dextrose Gel 1 Dose(s) Oral once PRN Blood Glucose LESS THAN 70 milliGRAM(s)/deciliter  glucagon  Injectable 1 milliGRAM(s) IntraMuscular once PRN Glucose LESS THAN 70 milligrams/deciliter    No Known Allergies    SOCIAL HISTORY:  (+)HHA; fully dependent for ADLS; h/o ETOH use, but no longer, Denies smoking, drugs    FAMILY HISTORY:  No pertinent family history in first degree relatives      Vital Signs Last 24 Hrs  T(C): 36.7 (23 Oct 2017 04:58), Max: 37.2 (22 Oct 2017 20:44)  T(F): 98.1 (23 Oct 2017 04:58), Max: 99 (22 Oct 2017 20:44)  HR: 80 (23 Oct 2017 04:58) (80 - 90)  BP: 123/77 (23 Oct 2017 04:58) (109/63 - 123/77)  BP(mean): --  RR: 18 (23 Oct 2017 04:58) (18 - 18)  SpO2: 100% (23 Oct 2017 04:58) (98% - 100%)    NAD /  Alert  Versa Care P500 bed    Cardiovascular: RRR     Respiratory: CTA    Gastrointestinal soft NT/ND (+)BS    Neurology  weakened strength & sensation grossly intact/ paraesthesia  nonverbal, no follow commands    Musculoskeletal/Vascular:  Rt side hemiparesthesis  Passive ROM Lt sided   No edema, BLE equally warm  no deformities/ contractures- RU/LE  w/ splints in place    Skin:  frail w/ decreased turgor, but good cap refil    Sacral probable Stage III pressure ulcer moist & granular w/ evolving DTI noted centrally in wound  4cm x 2cm x 0.2cm   (+) scant serosanguinous drainage  No odor, erythema, increased warmth, tenderness, induration, fluctuance    LABS:                        10.1   6.3   )-----------( 203      ( 23 Oct 2017 06:10 )             31.3     10-23    142  |  97  |  63<H>  ----------------------------<  93  4.1   |  26  |  5.62<H>    Ca    8.8      23 Oct 2017 06:10    TPro  6.5  /  Alb  3.2<L>  /  TBili  0.5  /  DBili  x   /  AST  27  /  ALT  29  /  AlkPhos  85  10-23    PT/INR - ( 23 Oct 2017 06:11 )   PT: 19.0 sec;   INR: 1.72 ratio     PTT - ( 23 Oct 2017 06:11 )  PTT:33.9 sec    HgA1c  09-13 @ 10:32  6.5    RADIOLOGY & ADDITIONAL STUDIES:  Cultures:      < from: CT Abdomen and Pelvis No Cont (10.22.17 @ 11:45) >  LOWER CHEST: Minimal bibasilar linear atelectasis.. Coronary artery   atherosclerosis. Aortic valve and mitral annular calcifications. Venous   catheter terminates at the distal superior vena cava.    LIVER: Within normal limits.  BILE DUCTS: Normal caliber.  GALLBLADDER: Within normal limits.  SPLEEN: Within normal limits.  PANCREAS: Within normal limits.  ADRENALS: Within normal limits.  KIDNEYS/URETERS: Atrophic left kidney. Bilateral subcentimeter   nonobstructing renal calculi, unchanged. No hydronephrosis.    BLADDER: Multiple bladder calculi measuringup to 1.5 cm.. Small amount   of submucosal fat in the anterior bladder wall.  REPRODUCTIVE ORGANS: The prostate is within normal limits.    BOWEL: No bowel obstruction. Colonic diverticulosis. Appendix is normal.  PERITONEUM: No ascites.  VESSELS:  Atherosclerotic changes.  RETROPERITONEUM: No lymphadenopathy.    ABDOMINAL WALL: Within normal limits.  BONES: Degenerative changes of the spine.    < end of copied text > Wound SURGERY CONSULT NOTE    HPI:  73 yo M PMH CAD s/p 12-14 stents placed [id number], T2 DM. HTN HLD, CVA w/ residual R sided facial weakness, radiculopathy with herniated disc, COPD, CKD on dialysis MWF, bipolar (no meds followed by psychiatry), chronic kidney stones and s/p had urethral stents in the past presents from rehab facility for episode of hypotension to SBP 90s while at dialysis.   Pt was discharged 10/18 after long hospital stay when initially presented with NSTEMI/pulm edema and transferred to [facility] 9/12 for LHC + BABAR x 3 + ARF on CKD +HD, + UTI on CTX.  Pt declined HD at one point, rising K, did undergo HD and later afterwards + unresponsive, CODE BLUE x 20 min with ROSC c/b VT s/p shock/amio load c/b CVA (r.weakness + embolic CVAs on CT), + new AF + heparin gtt c/b groin hematoma s/p txn, new CHF EF 35%, + palliative care discussions + dnr/dni.  Planned to cont HD via new r.permcath.  dysphagia treated with dysphagia 1 diet with honey liquids, PPM declined by family and sent to rehab.  Pt sbps low 100s to 110s.  on discharge trop 7.64    Pt has been poorly responsive since CVA.  Reportedly pt can grimace or shake head yes/no to some questions.  Has been eating/drinking and tolerated regular food yesterday.  daughter had not noted any change in his behavior or cognition w/ episode of hypotn.  Confirmed dnr/dni.    Wound consult requested to help manage sacral wound.  Foam dressing placed while awaiting consult.  No fevers, redness, warmth, or drainage noted.  (+) incontinent & sedentary.  Off loading and pericare protocols initiated upon admission.      PAST MEDICAL & SURGICAL HISTORY:  CVA (cerebral vascular accident)  COPD (chronic obstructive pulmonary disease)  BPH (benign prostatic hyperplasia)  Bipolar affective disorder  CKD (chronic kidney disease) s/p AVF /permacath on HD  Pancreatitis  Hypertension  Diabetes mellitus  Coronary artery disease s/p  cardiac catheterization  HLD (hyperlipidemia)  Anemia  Fainting/ Dizziness  Cardiac arrhythmia  Hydronephrosis  Cholecystitis  Bipolar 1 disorder  Lumbar radiculopathy  Left heart failure  Reflux esophagitis    No significant past surgical history      REVIEW OF SYSTEMS  Pt unable to offer    MEDICATIONS  (STANDING):  amiodarone    Tablet 200 milliGRAM(s) Oral daily  artificial  tears Solution 1 Drop(s) Both EYES four times a day  aspirin enteric coated 81 milliGRAM(s) Oral daily  atorvastatin 40 milliGRAM(s) Oral at bedtime  busPIRone 5 milliGRAM(s) Oral two times a day  clopidogrel Tablet 75 milliGRAM(s) Oral daily  clotrimazole/betamethasone Cream 1 Application(s) Topical two times a day  dextrose 5%. 1000 milliLiter(s) (50 mL/Hr) IV Continuous <Continuous>  dextrose 50% Injectable 12.5 Gram(s) IV Push once  dextrose 50% Injectable 25 Gram(s) IV Push once  dextrose 50% Injectable 25 Gram(s) IV Push once  fluticasone propionate   110 MICROgram(s) HFA Inhaler 1 Puff(s) Inhalation daily  insulin lispro (HumaLOG) corrective regimen sliding scale   SubCutaneous three times a day before meals  pantoprazole   Suspension 40 milliGRAM(s) Oral before breakfast  piperacillin/tazobactam IVPB. 3.375 Gram(s) IV Intermittent every 12 hours  sevelamer carbonate Powder 1600 milliGRAM(s) Oral three times a day with meals  tamsulosin 0.4 milliGRAM(s) Oral at bedtime  vancomycin  IVPB 1000 milliGRAM(s) IV Intermittent <User Schedule>    MEDICATIONS  (PRN):  ALBUTerol    90 MICROgram(s) HFA Inhaler 2 Puff(s) Inhalation every 6 hours PRN Shortness of Breath and/or Wheezing  dextrose Gel 1 Dose(s) Oral once PRN Blood Glucose LESS THAN 70 milliGRAM(s)/deciliter  glucagon  Injectable 1 milliGRAM(s) IntraMuscular once PRN Glucose LESS THAN 70 milligrams/deciliter    No Known Allergies    SOCIAL HISTORY:  (+)HHA; fully dependent for ADLS; h/o ETOH use, but no longer, Denies smoking, drugs    FAMILY HISTORY:  No pertinent family history in first degree relatives      Vital Signs Last 24 Hrs  T(C): 36.7 (23 Oct 2017 04:58), Max: 37.2 (22 Oct 2017 20:44)  T(F): 98.1 (23 Oct 2017 04:58), Max: 99 (22 Oct 2017 20:44)  HR: 80 (23 Oct 2017 04:58) (80 - 90)  BP: 123/77 (23 Oct 2017 04:58) (109/63 - 123/77)  BP(mean): --  RR: 18 (23 Oct 2017 04:58) (18 - 18)  SpO2: 100% (23 Oct 2017 04:58) (98% - 100%)    NAD /  Alert  Versa Care P500 bed    Cardiovascular: RRR     Respiratory: CTA    Gastrointestinal soft NT/ND (+)BS    Neurology  Rt sided hemiparesis/ nonverbal, no follow commands    Musculoskeletal/Vascular:  Rt side hemiparesthesis  Passive ROM Lt sided   No edema, BLE equally warm  no deformities/ contractures- RU/LE  w/ splints in place    Skin:  frail w/ decreased turgor, but good cap refil    Sacral probable Stage III pressure ulcer moist & granular w/ evolving DTI noted centrally in wound  4cm x 2cm x 0.2cm   (+) scant serosanguinous drainage  No odor, erythema, increased warmth, tenderness, induration, fluctuance    LABS:                        10.1   6.3   )-----------( 203      ( 23 Oct 2017 06:10 )             31.3     10-23    142  |  97  |  63<H>  ----------------------------<  93  4.1   |  26  |  5.62<H>    Ca    8.8      23 Oct 2017 06:10    TPro  6.5  /  Alb  3.2<L>  /  TBili  0.5  /  DBili  x   /  AST  27  /  ALT  29  /  AlkPhos  85  10-23    PT/INR - ( 23 Oct 2017 06:11 )   PT: 19.0 sec;   INR: 1.72 ratio     PTT - ( 23 Oct 2017 06:11 )  PTT:33.9 sec    HgA1c  09-13 @ 10:32  6.5    RADIOLOGY & ADDITIONAL STUDIES:  Cultures:      < from: CT Abdomen and Pelvis No Cont (10.22.17 @ 11:45) >  LOWER CHEST: Minimal bibasilar linear atelectasis.. Coronary artery   atherosclerosis. Aortic valve and mitral annular calcifications. Venous   catheter terminates at the distal superior vena cava.    LIVER: Within normal limits.  BILE DUCTS: Normal caliber.  GALLBLADDER: Within normal limits.  SPLEEN: Within normal limits.  PANCREAS: Within normal limits.  ADRENALS: Within normal limits.  KIDNEYS/URETERS: Atrophic left kidney. Bilateral subcentimeter   nonobstructing renal calculi, unchanged. No hydronephrosis.    BLADDER: Multiple bladder calculi measuringup to 1.5 cm.. Small amount   of submucosal fat in the anterior bladder wall.  REPRODUCTIVE ORGANS: The prostate is within normal limits.    BOWEL: No bowel obstruction. Colonic diverticulosis. Appendix is normal.  PERITONEUM: No ascites.  VESSELS:  Atherosclerotic changes.  RETROPERITONEUM: No lymphadenopathy.    ABDOMINAL WALL: Within normal limits.  BONES: Degenerative changes of the spine.    < end of copied text >

## 2017-10-23 NOTE — PROGRESS NOTE ADULT - PROBLEM SELECTOR PLAN 4
though pt has depressed t's on ekg trop is dramatically lower than last troponin  will monitor on telemetry  cont asa/plavix though pt has depressed t's on ekg trop is dramatically lower than last troponin  will monitor on telemetry  - cont asa/plavix

## 2017-10-24 ENCOUNTER — TRANSCRIPTION ENCOUNTER (OUTPATIENT)
Age: 72
End: 2017-10-24

## 2017-10-24 LAB
ANION GAP SERPL CALC-SCNC: 22 MMOL/L — HIGH (ref 5–17)
APTT BLD: 30.7 SEC — SIGNIFICANT CHANGE UP (ref 27.5–37.4)
BASOPHILS # BLD AUTO: 0 K/UL — SIGNIFICANT CHANGE UP (ref 0–0.2)
BASOPHILS NFR BLD AUTO: 0.4 % — SIGNIFICANT CHANGE UP (ref 0–2)
BUN SERPL-MCNC: 82 MG/DL — HIGH (ref 7–23)
CALCIUM SERPL-MCNC: 8.9 MG/DL — SIGNIFICANT CHANGE UP (ref 8.4–10.5)
CHLORIDE SERPL-SCNC: 99 MMOL/L — SIGNIFICANT CHANGE UP (ref 96–108)
CO2 SERPL-SCNC: 22 MMOL/L — SIGNIFICANT CHANGE UP (ref 22–31)
CREAT SERPL-MCNC: 7.06 MG/DL — HIGH (ref 0.5–1.3)
EOSINOPHIL # BLD AUTO: 0.3 K/UL — SIGNIFICANT CHANGE UP (ref 0–0.5)
EOSINOPHIL NFR BLD AUTO: 4.1 % — SIGNIFICANT CHANGE UP (ref 0–6)
GLUCOSE BLDC GLUCOMTR-MCNC: 119 MG/DL — HIGH (ref 70–99)
GLUCOSE BLDC GLUCOMTR-MCNC: 125 MG/DL — HIGH (ref 70–99)
GLUCOSE BLDC GLUCOMTR-MCNC: 165 MG/DL — HIGH (ref 70–99)
GLUCOSE BLDC GLUCOMTR-MCNC: 98 MG/DL — SIGNIFICANT CHANGE UP (ref 70–99)
GLUCOSE SERPL-MCNC: 97 MG/DL — SIGNIFICANT CHANGE UP (ref 70–99)
HCT VFR BLD CALC: 29.3 % — LOW (ref 39–50)
HGB BLD-MCNC: 9.7 G/DL — LOW (ref 13–17)
INR BLD: 1.37 RATIO — HIGH (ref 0.88–1.16)
LYMPHOCYTES # BLD AUTO: 1.4 K/UL — SIGNIFICANT CHANGE UP (ref 1–3.3)
LYMPHOCYTES # BLD AUTO: 21.3 % — SIGNIFICANT CHANGE UP (ref 13–44)
MAGNESIUM SERPL-MCNC: 2.2 MG/DL — SIGNIFICANT CHANGE UP (ref 1.6–2.6)
MCHC RBC-ENTMCNC: 29.8 PG — SIGNIFICANT CHANGE UP (ref 27–34)
MCHC RBC-ENTMCNC: 33 GM/DL — SIGNIFICANT CHANGE UP (ref 32–36)
MCV RBC AUTO: 90.4 FL — SIGNIFICANT CHANGE UP (ref 80–100)
MONOCYTES # BLD AUTO: 0.6 K/UL — SIGNIFICANT CHANGE UP (ref 0–0.9)
MONOCYTES NFR BLD AUTO: 8.5 % — SIGNIFICANT CHANGE UP (ref 2–14)
NEUTROPHILS # BLD AUTO: 4.4 K/UL — SIGNIFICANT CHANGE UP (ref 1.8–7.4)
NEUTROPHILS NFR BLD AUTO: 65.7 % — SIGNIFICANT CHANGE UP (ref 43–77)
PHOSPHATE SERPL-MCNC: 5.6 MG/DL — HIGH (ref 2.5–4.5)
PLATELET # BLD AUTO: 208 K/UL — SIGNIFICANT CHANGE UP (ref 150–400)
POTASSIUM SERPL-MCNC: 4.1 MMOL/L — SIGNIFICANT CHANGE UP (ref 3.5–5.3)
POTASSIUM SERPL-SCNC: 4.1 MMOL/L — SIGNIFICANT CHANGE UP (ref 3.5–5.3)
PROTHROM AB SERPL-ACNC: 14.9 SEC — HIGH (ref 9.8–12.7)
RBC # BLD: 3.24 M/UL — LOW (ref 4.2–5.8)
RBC # FLD: 13.5 % — SIGNIFICANT CHANGE UP (ref 10.3–14.5)
SODIUM SERPL-SCNC: 143 MMOL/L — SIGNIFICANT CHANGE UP (ref 135–145)
WBC # BLD: 6.7 K/UL — SIGNIFICANT CHANGE UP (ref 3.8–10.5)
WBC # FLD AUTO: 6.7 K/UL — SIGNIFICANT CHANGE UP (ref 3.8–10.5)

## 2017-10-24 PROCEDURE — 99233 SBSQ HOSP IP/OBS HIGH 50: CPT | Mod: GC

## 2017-10-24 PROCEDURE — 99232 SBSQ HOSP IP/OBS MODERATE 35: CPT

## 2017-10-24 RX ORDER — WARFARIN SODIUM 2.5 MG/1
3 TABLET ORAL ONCE
Qty: 0 | Refills: 0 | Status: COMPLETED | OUTPATIENT
Start: 2017-10-24 | End: 2017-10-24

## 2017-10-24 RX ORDER — ERYTHROPOIETIN 10000 [IU]/ML
10000 INJECTION, SOLUTION INTRAVENOUS; SUBCUTANEOUS
Qty: 0 | Refills: 0 | Status: DISCONTINUED | OUTPATIENT
Start: 2017-10-24 | End: 2017-11-05

## 2017-10-24 RX ADMIN — Medication 81 MILLIGRAM(S): at 12:53

## 2017-10-24 RX ADMIN — WARFARIN SODIUM 3 MILLIGRAM(S): 2.5 TABLET ORAL at 21:31

## 2017-10-24 RX ADMIN — CLOTRIMAZOLE AND BETAMETHASONE DIPROPIONATE 1 APPLICATION(S): 10; .5 CREAM TOPICAL at 06:45

## 2017-10-24 RX ADMIN — Medication 5 MILLIGRAM(S): at 17:12

## 2017-10-24 RX ADMIN — CLOTRIMAZOLE AND BETAMETHASONE DIPROPIONATE 1 APPLICATION(S): 10; .5 CREAM TOPICAL at 17:13

## 2017-10-24 RX ADMIN — Medication 1 DROP(S): at 12:53

## 2017-10-24 RX ADMIN — AMIODARONE HYDROCHLORIDE 200 MILLIGRAM(S): 400 TABLET ORAL at 06:44

## 2017-10-24 RX ADMIN — PANTOPRAZOLE SODIUM 40 MILLIGRAM(S): 20 TABLET, DELAYED RELEASE ORAL at 06:45

## 2017-10-24 RX ADMIN — CLOPIDOGREL BISULFATE 75 MILLIGRAM(S): 75 TABLET, FILM COATED ORAL at 12:54

## 2017-10-24 RX ADMIN — SEVELAMER CARBONATE 1600 MILLIGRAM(S): 2400 POWDER, FOR SUSPENSION ORAL at 17:12

## 2017-10-24 RX ADMIN — Medication 1 DROP(S): at 06:45

## 2017-10-24 RX ADMIN — Medication 1 PUFF(S): at 12:54

## 2017-10-24 RX ADMIN — ATORVASTATIN CALCIUM 40 MILLIGRAM(S): 80 TABLET, FILM COATED ORAL at 21:31

## 2017-10-24 RX ADMIN — SEVELAMER CARBONATE 1600 MILLIGRAM(S): 2400 POWDER, FOR SUSPENSION ORAL at 12:56

## 2017-10-24 RX ADMIN — Medication 5 MILLIGRAM(S): at 06:45

## 2017-10-24 RX ADMIN — Medication 1 DROP(S): at 17:12

## 2017-10-24 RX ADMIN — TAMSULOSIN HYDROCHLORIDE 0.4 MILLIGRAM(S): 0.4 CAPSULE ORAL at 21:31

## 2017-10-24 NOTE — PROGRESS NOTE ADULT - PROBLEM SELECTOR PLAN 9
pt currently in sinus rhythm  monitor for episodes of AF on tele  INR now subtherapeutic, will dose Coumadin pt currently in sinus rhythm  monitor for episodes of AF on tele  INR subtherapeutic, will dose Coumadin pt currently in sinus rhythm  monitor for episodes of AF on tele  INR subtherapeutic, will dose Coumadin/ F/up INR In AM

## 2017-10-24 NOTE — PROGRESS NOTE ADULT - ASSESSMENT
71 yo with DM,ESRD recent HD,CVA ,non verbal.Recent bacteremia,pyelo s/p antimicrobials  Now with hypotension during HD  Exam with faint macular rash which has resolved  Blood Cx-CNS and Bacillus-not sustained   Repeat Cx negative  CT abd pelvis and CXR negative  Clinically stable  No fevers  ?Contaminant  Less likley HD cath related BSI  Stable off abx  Would check Surveillance blood Cx in 24-48 hrs  Tailor plan per course,results.  Other plan per primary team  I am away tomorrow,Infectious Diseases Service will cover .  Please call [phone] if issues

## 2017-10-24 NOTE — DISCHARGE NOTE ADULT - CARE PLAN
Principal Discharge DX:	Bacteremia associated with intravascular line  Goal:	Please follow up with a primary care provider within one week of discharge.  Instructions for follow-up, activity and diet:	During your hospitalization, multiple blood cultures that were drawn grew coagulase negative staphylococcus. The permacath that you had when you came to the hospital was removed and replaced with a new permacath. Please seek medical attention if you develop a high fever or worsening mental status. Please follow up with your primary care provider within one week of your discharge from the hospital.  Secondary Diagnosis:	ESRD (end stage renal disease) on dialysis  Goal:	Please follow up with a primary care provider within one week of discharge as well as vascular surgeon, Dr. Saez, and your nephrologist.  Instructions for follow-up, activity and diet:	You have a history of end stage renal disease which means that your kidney function is significantly reduced. You had an arteriovenous fistula placed in your right arm by Vascular Surgery. Please continue with dialysis treatments three times per week. Please consume a renal diet with Nepro cans three times daily. Please follow up with your primary care provider within one week of your discharge from the hospital as well as your nephrologist, Dr. Solis, and vascular surgeon, Dr. Saez.  Secondary Diagnosis:	Paroxysmal atrial fibrillation  Goal:	Please follow up with a primary care provider within one week of discharge as well as a cardiologist.  Instructions for follow-up, activity and diet:	You have a history of atrial fibrillation which means that you have an irregular heart rate and rhythm. Please take your medication as prescribed. Please seek medical attention if you notice your heart is beating too fast. Please follow up with your primary care provider within one week of your discharge from the hospital as well as a cardiologist.  Secondary Diagnosis:	Diabetes mellitus  Goal:	Please follow up with a primary care provider within one week of discharge.  Instructions for follow-up, activity and diet:	You have a history of diabetes mellitus which means that your blood sugar levels are high. Please limit dietary sugar consumption. Please follow up with your primary care provider within one week of your discharge from the hospital.  Secondary Diagnosis:	Coronary artery disease  Goal:	Please follow up with a primary care provider within one week of discharge as well as a cardiologist.  Instructions for follow-up, activity and diet:	You have a history of heart disease. Please take your medications as prescribed. Please seek medical attention if you develop severe chest pain, nausea, vomiting, sweating, or left shoulder, arm, neck, or jaw pain. Please follow up with your primary care provider within one week of your discharge from the hospital as well as a cardiologist.  Secondary Diagnosis:	CVA (cerebral vascular accident)  Goal:	Please follow up with a primary care provider within one week of discharge.  Instructions for follow-up, activity and diet:	You have a history of stroke. Please take your medications as prescribed. Please seek medical attention if you develop muscle weakness, numbness, tingling, or vision changes. Please follow up with your primary care provider within one week of your discharge from the hospital.  Secondary Diagnosis:	COPD (chronic obstructive pulmonary disease)  Goal:	Please follow up with a primary care provider within one week of discharge.  Instructions for follow-up, activity and diet:	You have a history of chronic obstructive pulmonary disease. Please take your medications as prescribed. Please seek medical attention if you develop severe cough or shortness of breath. Please follow up with your primary care provider within one week of your discharge from the hospital. Principal Discharge DX:	Bacteremia associated with intravascular line  Goal:	Please follow up with a primary care provider within one week of discharge.  Instructions for follow-up, activity and diet:	Gram positive bacteremia with hypotension and sever sepsis on presentation.   During your hospitalization, multiple blood cultures that were drawn grew coagulase negative staphylococcus. The PermCath that you had when you came to the hospital was removed and replaced with a new permacath. Please seek medical attention if you develop a high fever or worsening mental status. Please follow up with your primary care provider within one week of your discharge from the hospital.  Secondary Diagnosis:	ESRD (end stage renal disease) on dialysis  Goal:	Please follow up with a primary care provider within one week of discharge as well as vascular surgeon, Dr. Saez, and your nephrologist.  Instructions for follow-up, activity and diet:	You have a history of end stage renal disease which means that your kidney function is significantly reduced. You had an arteriovenous fistula placed in your right arm by Vascular Surgery. Please continue with dialysis treatments three times per week. Please consume a renal diet with Nepro cans three times daily. Please follow up with your primary care provider within one week of your discharge from the hospital as well as your nephrologist, Dr. Solis, and vascular surgeon, Dr. Saez.  Secondary Diagnosis:	Paroxysmal atrial fibrillation  Goal:	Please follow up with a primary care provider within one week of discharge as well as a cardiologist.  Instructions for follow-up, activity and diet:	You have a history of atrial fibrillation which means that you have an irregular heart rate and rhythm. Please take your medication as prescribed. Please seek medical attention if you notice your heart is beating too fast. Please follow up with your primary care provider within one week of your discharge from the hospital as well as a cardiologist. Will need daily INR with goal INR of 2-3  Secondary Diagnosis:	Diabetes mellitus  Goal:	Please follow up with a primary care provider within one week of discharge.  Instructions for follow-up, activity and diet:	You have a history of diabetes mellitus which means that your blood sugar levels are high. Please limit dietary sugar consumption. Please follow up with your primary care provider within one week of your discharge from the hospital.  Secondary Diagnosis:	Coronary artery disease  Goal:	Please follow up with a primary care provider within one week of discharge as well as a cardiologist.  Instructions for follow-up, activity and diet:	You have a history of heart disease. Please take your medications as prescribed. Please seek medical attention if you develop severe chest pain, nausea, vomiting, sweating, or left shoulder, arm, neck, or jaw pain. Please follow up with your primary care provider within one week of your discharge from the hospital as well as a cardiologist.  Secondary Diagnosis:	CVA (cerebral vascular accident)  Goal:	Please follow up with a primary care provider within one week of discharge.  Instructions for follow-up, activity and diet:	You have a history of stroke. Please take your medications as prescribed. Please seek medical attention if you develop muscle weakness, numbness, tingling, or vision changes. Please follow up with your primary care provider within one week of your discharge from the hospital.  Secondary Diagnosis:	COPD (chronic obstructive pulmonary disease)  Goal:	Please follow up with a primary care provider within one week of discharge.  Instructions for follow-up, activity and diet:	You have a history of chronic obstructive pulmonary disease. Please take your medications as prescribed. Please seek medical attention if you develop severe cough or shortness of breath. Please follow up with your primary care provider within one week of your discharge from the hospital. Principal Discharge DX:	Bacteremia associated with intravascular line  Goal:	Please follow up with a primary care provider within one week of discharge.  Instructions for follow-up, activity and diet:	Gram positive bacteremia with hypotension and sever sepsis on admission.   During your hospitalization, multiple blood cultures that were drawn grew coagulase negative staphylococcus. The PermCath that you had when you came to the hospital was removed and replaced with a new permacath. Please seek medical attention if you develop a high fever or worsening mental status. Please follow up with your primary care provider within one week of your discharge from the hospital.  Secondary Diagnosis:	ESRD (end stage renal disease) on dialysis  Goal:	Please follow up with a primary care provider within one week of discharge as well as vascular surgeon, Dr. Saez, and your nephrologist.  Instructions for follow-up, activity and diet:	You have a history of end stage renal disease which means that your kidney function is significantly reduced. You had an arteriovenous fistula placed in your right arm by Vascular Surgery. Please continue with dialysis treatments three times per week. Please consume a renal diet with Nepro cans three times daily. Please follow up with your primary care provider within one week of your discharge from the hospital as well as your nephrologist, Dr. Solis, and vascular surgeon, Dr. Saez.  Secondary Diagnosis:	Paroxysmal atrial fibrillation  Goal:	Please follow up with a primary care provider within one week of discharge as well as a cardiologist.  Instructions for follow-up, activity and diet:	You have a history of atrial fibrillation which means that you have an irregular heart rate and rhythm. Please take your medication as prescribed. Please seek medical attention if you notice your heart is beating too fast. Please follow up with your primary care provider within one week of your discharge from the hospital as well as a cardiologist. Will need daily INR with goal INR of 2-3  Secondary Diagnosis:	Diabetes mellitus  Goal:	Please follow up with a primary care provider within one week of discharge.  Instructions for follow-up, activity and diet:	You have a history of diabetes mellitus which means that your blood sugar levels are high. Please limit dietary sugar consumption. Please follow up with your primary care provider within one week of your discharge from the hospital. Daily INR with goal INR 2-3  Secondary Diagnosis:	Coronary artery disease  Goal:	Please follow up with a primary care provider within one week of discharge as well as a cardiologist.  Instructions for follow-up, activity and diet:	You have a history of heart disease. Please take your medications as prescribed. Please seek medical attention if you develop severe chest pain, nausea, vomiting, sweating, or left shoulder, arm, neck, or jaw pain. Please follow up with your primary care provider within one week of your discharge from the hospital as well as a cardiologist.  Secondary Diagnosis:	CVA (cerebral vascular accident)  Goal:	Please follow up with a primary care provider within one week of discharge.  Instructions for follow-up, activity and diet:	You have a history of stroke. Please take your medications as prescribed. Please seek medical attention if you develop muscle weakness, numbness, tingling, or vision changes. Please follow up with your primary care provider within one week of your discharge from the hospital.  Secondary Diagnosis:	COPD (chronic obstructive pulmonary disease)  Goal:	Please follow up with a primary care provider within one week of discharge.  Instructions for follow-up, activity and diet:	You have a history of chronic obstructive pulmonary disease. Please take your medications as prescribed. Please seek medical attention if you develop severe cough or shortness of breath. Please follow up with your primary care provider within one week of your discharge from the hospital.

## 2017-10-24 NOTE — PROGRESS NOTE ADULT - SUBJECTIVE AND OBJECTIVE BOX
Patient is a 72y old  Male who presents with a chief complaint of s/p cardiac cath (21 Oct 2017 15:35)    Being followed by ID for bacteremia    Interval history:Undergoing HD  No verbal response  No acute events      ROS:  Not obtainable     Antimicrobials:        Vital Signs Last 24 Hrs  T(C): 36.4 (10-24-17 @ 08:35), Max: 37 (10-23-17 @ 21:01)  T(F): 97.6 (10-24-17 @ 08:35), Max: 98.6 (10-23-17 @ 21:01)  HR: 87 (10-24-17 @ 08:35) (80 - 87)  BP: 132/76 (10-24-17 @ 08:35) (115/70 - 139/74)  BP(mean): --  RR: 18 (10-24-17 @ 08:35) (18 - 18)  SpO2: 99% (10-24-17 @ 08:35) (96% - 99%)    Physical Exam:    Constitutional well preserved,comfortable,pleasant    HEENT PERRLA EOMI,No pallor or icterus    No oral exudate or erythema    Neck supple no JVD or LN    R SC HD acth no erythema or tenderness    Chest Good AE,CTA    CVS RRR S1 S2 WNl No murmur or rub or gallop    Abd soft BS normal No tenderness no masses  PEG    Ext No cyanosis clubbing or edema    IV site no erythema tenderness or discharge    Joints no swelling or LOM    CNS Alert awkw,no verbal rsponse   does not follwo commands      Lab Data:                          9.7    6.7   )-----------( 208      ( 24 Oct 2017 07:15 )             29.3       10-24    143  |  99  |  82<H>  ----------------------------<  97  4.1   |  22  |  7.06<H>    Ca    8.9      24 Oct 2017 07:15  Phos  5.6     10-24  Mg     2.2     10-24    TPro  6.5  /  Alb  3.2<L>  /  TBili  0.5  /  DBili  x   /  AST  27  /  ALT  29  /  AlkPhos  85  10-23        Culture - Blood (collected 22 Oct 2017 09:45)  Source: .Blood Blood  Preliminary Report (23 Oct 2017 10:01):    No growth to date.    Culture - Blood (collected 22 Oct 2017 09:45)  Source: .Blood Blood  Preliminary Report (23 Oct 2017 10:01):    No growth to date.    Culture - Blood (collected 22 Oct 2017 05:02)  Source: .Blood Blood-Peripheral  Preliminary Report (23 Oct 2017 06:00):    No growth to date.    Culture - Blood (collected 22 Oct 2017 05:02)  Source: .Blood Blood-Peripheral  Preliminary Report (23 Oct 2017 06:00):    No growth to date.    Culture - Blood (collected 21 Oct 2017 00:35)  Source: .Blood Blood-Peripheral  Gram Stain (22 Oct 2017 02:48):    Growth in aerobic bottle: Gram Positive Cocci in Clusters  Final Report (22 Oct 2017 19:34):    Growth in aerobic bottle: Coag Negative Staphylococcus    Single set isolate, possible contaminant. Contact       Culture - Blood (collected 21 Oct 2017 00:35)  Source: .Blood Blood-Peripheral  Gram Stain (21 Oct 2017 11:31):    Growth in aerobic bottle: Gram Variable Rods  Final Report (22 Oct 2017 09:26):    Growth in aerobic bottle: Bacillus species not anthracis    *

## 2017-10-24 NOTE — DISCHARGE NOTE ADULT - PATIENT PORTAL LINK FT
“You can access the [facility] Patient Portal, offered by [facility], by registering with the following website: [email or web address]”

## 2017-10-24 NOTE — PROGRESS NOTE ADULT - PROBLEM SELECTOR PLAN 2
- unknown etiology, diffuse maculopapular rash now resolved with small area still present around R axilla  - discussed with daughter - rash is new, no new medications at rehab, no new meds given in ED. - unknown etiology, diffuse maculopapular rash now resolved

## 2017-10-24 NOTE — DISCHARGE NOTE ADULT - CARE PROVIDERS DIRECT ADDRESSES
,[email or web address]

## 2017-10-24 NOTE — DISCHARGE NOTE ADULT - HOSPITAL COURSE
History of Present Illness  71 yo M PMH CAD s/p 12-14 stents placed 2230-2095, T2 DM. HTN HLD, w/ residual R sided facial weakness, radiculopathy with herniated disc, COPD, CKD Cr 2 months ago 1.2 on dialysis MWF, bipolar (no meds followed by psychiatry), chronic kidney stones and has had urethral tents in the past presents from rehab facility for episode of hypotension to SBP 90s while at dialysis today.   Pt was discharged 10/18 after long hospital stay when initially presented with NSTEMI/pulm edema and transferred to [facility] 9/12 for LHC + BABAR x 3 + ARF on CKD +HD, + UTI on CTX.  Pt declined HD at one point, rising K, did undergo HD and later afterwards + unresponsive, CODE BLUE x 20 min with ROSC c/b VT s/p shock/amio load c/b CVA (r.weakness + embolic CVAs on CT), + new AF + heparin gtt c/b groin hematoma s/p txn, new CHF EF 35%, + palliative care discussions + dnr/dni.  [facility]  dysphagia treated with dysphagia 1 diet with honey liquids, PPM declined by family and sent to rehab.  Pt sbps low 100s to 110s.  on discharge trop 7.64    Hospital Course  Spoke with daughter Anisa Lara [phone].  Pt has been poorly responsive since CVA.  Reportedly pt can grimace or shake head yes/no to some questions.  Has been eating/drinking and tolerated regular food yesterday.  Pt went to first HD yesterday and became hypotensive but daughter had not noted any change in his behavior or cognition.  Confirmed DNR/DNI. In ED 99/62, hr 78, rr 18, temp 98.3, sat 100 no interventions done. History of Present Illness  This is a 72 year old male with a PMHx of CAD s/p 12-14 stents placed [id number], T2 DM. HTN HLD, w/ residual R sided facial weakness, radiculopathy with herniated disc, COPD, CKD on dialysis T/Th/S, bipolar (no meds followed by psychiatry), chronic kidney stones and has had urethral stents in the past presents from rehab facility for episode of hypotension to SBP 90s while at dialysis today. Of note, patient was discharged on 10/18 after a long and complicated hospital stay where he initially presented with NSTEMI/pulm edema and transferred to [facility] 9/12 for left heart cath and 3 drug eluding stents. The hospitalization was complicated by acute renal failure requiring dialysis, code blue, new atrial fibrillation, new heart failure with reduced EF, embolic CVA which left him nonverbal, groin hematoma and debilitated, and sepsis 2/2 pyelonephritis. Patient was eventually stabilized, placed on dysphagia 1 diet, and permacath was placed for dialysis. Family declined PPM and was sent to rehab. While at rehab dialysis session, patient was noted to be hypotensive down to systolic blood pressure of 90s.    Hospital Course  In the emergency room, patient was afebrile and vital signs were stable. Initial labs were within normal limits with no leukocytosis. CXR was clear and UA indicated no infection. CT Abdomen and Pelvis demonstrated no intra-abdominal source for bacteremia demonstrated. Patient was admitted to medicine. Patient was found with a diffuse maculopapular rash which resolved within 24 hours. Preliminary blood cultures from admission (10/21) grew gram bacillus and coagulase negative staph. Patient was started on empiric antibiotics for presumed infection. Infectious disease and nephrology were consulted. Repeat blood cultures from 10/22 were all negative so antibiotics were discontinued. However, repeat cultures from 10/24 grew back coagulase negative staph again. Patient was started on 500mg vancomycin post dialysis on 10/26 due to these repeat blood cultures.        10/21	admitted with hypotension, NSTE-MI, Rash, ESRD/HD, house renal was called to find out whether the Fellow got consult message--I was told by service that Fellow got the consult.  Addendum--he has private nephrologist (Sanford Solis)--called, claritin for rash, coumadin on hold.  Blood culture shows gm variable rods in anaerobic bottle, house ID consult called 	  10/21:  3D - Rec'd report for ED.  Patient was seen by ID for bacteremia and noted to have full body rash prior to Vanco and Zosyn.  Unclear etiology for rash, started on Lotrizone cream.  Hold coumadin tonight per cards.  10/21- bc/s GPC in clusters pt on zosyn and vanc  10/22: Pending CT of Abdomen/Pelvis -- source of infection from abdomen??  10/23: Seen by ID.  Discontinue all antibiotics.  Do Surveillance blood cultures  24-48 hours on the day of hemodialysis. Seen by wound care.  Continue present wound care regimen to the sacrum.    10/23 - no fevers. BCx with contaminants x2. ID consult says to consider taking off abx and observing. will reculture during HD tomorrow  10/24 - patient went to HD today, tolerated without an episode of hypotension.   10/25 - patient with episode of sinus tachycardioa after PT assessment which resolved. cultures from 10/24 1 day off abx growing gram positive cocci in clusters in aerobic and anerobic bottles.  10/26: No issues today. Per ID c/w vanco. may need further suppression with abx.  10/27 - status quo  10/28 - For dialysis today, will give vancomycin post HD  10/29 - nothing  10/30 - blood culture drawn  10/31 -[ Getting dialysis today, dose vanc 500mg post HD  11/1 - INR remains subtherapeutic today, dosed 5 mg. Bcx from 10/30 negative after 48 hours  11/2 Dosed coumadin today, dosed Vanc after dialysis, will draw blood culture Last day of vanc is today   11/3 Hold coumadin for today, supratherapetic   11/4: there is a policy that blood cultures are not drawn in dialysis according to RN. hold coumadin for supratherapeutic INR. blood cultures from 11/2 are positive (gram +cocci). will f/u with ID on need for repeat. awaiting draw by phlebotomy. blood cultures remain positive which necessitates removal of permacath however INR is > 3. will dose vancomycin today. IR should rermove on monday.   11/5 Holding coumadin, as IR will not remove permacath until INR is <2   11/6 - patient will have permacath exchanged by IR today. will dose vanco after dialysis session. vascular for AV fistula creation on Thursday  Will need to get in touch with nephrology when next dialysis will be in order to place shiley  11/7 will get a vanc trough to determine if pt will need abx mid week, otherwise will ask Dr. Solis when next dialysis will be. will call IR to set up shiley placement.    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 Of note, patient was discharged on 10/18/2017 after a long and complicated hospital stay where he initially presented with non-ST segment elevation myocardial infarction and pulmonary edema and was transferred to [facility] on 9/12/2017 for left heart catheterization during which 3 drug eluting stents were placed. Patient's course was complicated by acute renal failure requiring dialysis, code blue, new-onset atrial fibrillation, new-onset heart failure with reduced ejection fraction, embolic stroke which left patient nonverbal, debilitated, and with groin hematoma, and sepsis secondary to pyelonephritis. Patient was eventually stabilized, placed on a dysphagia 1 diet, and underwent permacath placement for dialysis. Patient's family declined permanent pacemaker and patient was sent to rehab. While at a dialysis session in rehab, patient was noted to be hypotensive and was brought to the emergency department. In the emergency department, patient was hemodynamically stable. Initial labs were within normal limits with no leukocytosis. CXR was clear and UA indicated no infection. CT Abdomen and Pelvis demonstrated no intra-abdominal source for bacteremia demonstrated. Patient was admitted to medicine. Patient was found with a diffuse maculopapular rash which resolved within 24 hours. Preliminary blood cultures from admission (10/21) grew gram bacillus and coagulase negative staph. Patient was started on empiric antibiotics for presumed infection. Infectious disease and nephrology were consulted. Repeat blood cultures from 10/22 were all negative so antibiotics were discontinued. However, repeat cultures from 10/24 grew back coagulase negative staph again. Patient was started on 500mg vancomycin post dialysis on 10/26 due to these repeat blood cultures.        10/21	admitted with hypotension, NSTE-MI, Rash, ESRD/HD, andriy kent was called to find out whether the Fellow got consult message--I was told by service that Fellow got the consult.  Addendum--he has private nephrologist (Sanford Solis)--called, claritin for rash, coumadin on hold.  Blood culture shows gm variable rods in anaerobic bottle, house ID consult called 	  10/21:  3D - Rec'd report for ED.  Patient was seen by ID for bacteremia and noted to have full body rash prior to Vanco and Zosyn.  Unclear etiology for rash, started on Lotrizone cream.  Hold coumadin tonight per cards.  10/21- bc/s GPC in clusters pt on zosyn and vanc  10/22: Pending CT of Abdomen/Pelvis -- source of infection from abdomen??  10/23: Seen by ID.  Discontinue all antibiotics.  Do Surveillance blood cultures  24-48 hours on the day of hemodialysis. Seen by wound care.  Continue present wound care regimen to the sacrum.    10/23 - no fevers. BCx with contaminants x2. ID consult says to consider taking off abx and observing. will reculture during HD tomorrow  10/24 - patient went to HD today, tolerated without an episode of hypotension.   10/25 - patient with episode of sinus tachycardioa after PT assessment which resolved. cultures from 10/24 1 day off abx growing gram positive cocci in clusters in aerobic and anerobic bottles.  10/26: No issues today. Per ID c/w vanco. may need further suppression with abx.  10/27 - status quo  10/28 - For dialysis today, will give vancomycin post HD  10/29 - nothing  10/30 - blood culture drawn  10/31 -[ Getting dialysis today, dose vanc 500mg post HD  11/1 - INR remains subtherapeutic today, dosed 5 mg. Bcx from 10/30 negative after 48 hours  11/2 Dosed coumadin today, dosed Vanc after dialysis, will draw blood culture Last day of vanc is today   11/3 Hold coumadin for today, supratherapetic   11/4: there is a policy that blood cultures are not drawn in dialysis according to RN. hold coumadin for supratherapeutic INR. blood cultures from 11/2 are positive (gram +cocci). will f/u with ID on need for repeat. awaiting draw by phlebotomy. blood cultures remain positive which necessitates removal of permacath however INR is > 3. will dose vancomycin today. IR should rermove on monday.   11/5 Holding coumadin, as IR will not remove permacath until INR is <2   11/6 - patient will have permacath exchanged by IR today. will dose vanco after dialysis session. vascular for AV fistula creation on Thursday  Will need to get in touch with nephrology when next dialysis will be in order to place shiley  11/7 will get a vanc trough to determine if pt will need abx mid week, otherwise will ask Dr. Solis when next dialysis will be. will call IR to set up shiley placement.    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 Of note, patient was discharged on 10/18/2017 after a long and complicated hospital stay where he initially presented with non-ST segment elevation myocardial infarction and pulmonary edema and was transferred to [facility] on 9/12/2017 for left heart catheterization during which 3 drug eluting stents were placed. Patient's course was complicated by acute renal failure requiring dialysis, code blue, new-onset atrial fibrillation, new-onset heart failure with reduced ejection fraction, embolic stroke which left patient nonverbal, debilitated, and with groin hematoma, and sepsis secondary to pyelonephritis. Patient was eventually stabilized, placed on a dysphagia 1 diet, and underwent permacath placement for dialysis. Patient's family declined permanent pacemaker and patient was sent to rehab. While at a dialysis session in rehab, patient was noted to be hypotensive and was brought to the emergency department. In the emergency department, patient was hypotensive with systolic blood pressure in the 90s but was otherwise hemodynamically stable. Patient did not have leukocytosis. Chest x-ray demonstrated no acute disease. Patient was admitted to medicine for further management. Patient was found to have a diffuse maculopapular rash that resolved with lotrizone cream. Initial blood cultures grew gram variable rods and coagulase negative staphylococcus. Patient was started on empiric antibiotic therapy with vancomycin and zosyn and blood cultures were repeated per Infectious Disease recommendations. CT scan of the abdomen and pelvis demonstrated no intra-abdominal source of bacteremia. Nephrology was consulted to arrange dialysis. Repeat blood cultures came back negative so antibiotics were discontinued. However, surveillance blood cultures grew coagulase negative staphylococcus so vancomycin after dialysis sessions was started. Subsequent blood cultures were negative. Patient completed course of vancomycin. Vascular Surgery was consulted for placement of arteriovenous fistula for dialysis. Repeat surveillance blood cultures, which were obtained per Infectious Disease recommendations, grew coagulase negative staphylococcus. Due to concern that permacath was the source of patient's bacteremia, permacath was removed by Interventional Radiology. Subsequent blood cultures came back negative. Intervention Radiology placed a new permacath which oozed clotted blood. Patient was placed on heparin drip in preparation for arteriovenous fistula placement. Permacath continued to ooze clotted blood but patient was hemodynamically stable. Eventually, permacath ceased to ooze blood. Heparin drip was held beginning at midnight prior to arteriovenous fistula placement. Patient tolerated the procedure well. Patient was bridged back to coumadin with heparin drip. 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 Of note, patient was discharged on 10/18/2017 after a long and complicated hospital stay where he initially presented with non-ST segment elevation myocardial infarction and pulmonary edema and was transferred to [facility] on 9/12/2017 for left heart catheterization during which 3 drug eluting stents were placed. Patient's course was complicated by acute renal failure requiring dialysis, code blue, new-onset atrial fibrillation, new-onset heart failure with reduced ejection fraction, embolic stroke which left patient nonverbal, debilitated, and with groin hematoma, and sepsis secondary to pyelonephritis. Patient was eventually stabilized, placed on a dysphagia 1 diet, and underwent permacath placement for dialysis. Patient's family declined permanent pacemaker and patient was sent to rehab. While at a dialysis session in rehab, patient was noted to be hypotensive and was brought to the emergency department. In the emergency department, patient was hypotensive with systolic blood pressure in the 90s but was otherwise hemodynamically stable. Patient did not have leukocytosis. Chest x-ray demonstrated no acute disease. Patient was admitted to medicine for further management. Patient was found to have a diffuse maculopapular rash that resolved with lotrizone cream. Initial blood cultures grew gram variable rods and coagulase negative staphylococcus. Patient was started on empiric antibiotic therapy with vancomycin and zosyn and blood cultures were repeated per Infectious Disease recommendations. CT scan of the abdomen and pelvis demonstrated no intra-abdominal source of bacteremia. Nephrology was consulted to arrange dialysis. Repeat blood cultures came back negative so antibiotics were discontinued. However, surveillance blood cultures grew coagulase negative staphylococcus so vancomycin after dialysis sessions was started. Subsequent blood cultures were negative. Patient completed course of vancomycin. Vascular Surgery was consulted for placement of arteriovenous fistula for dialysis. Repeat surveillance blood cultures, which were obtained per Infectious Disease recommendations, grew coagulase negative staphylococcus. Due to concern that permacath was the source of patient's bacteremia, permacath was removed by Interventional Radiology. Subsequent blood cultures came back negative. Intervention Radiology placed a new permacath which oozed clotted blood. Patient was placed on heparin drip in preparation for arteriovenous fistula placement. Permacath continued to ooze clotted blood but patient was hemodynamically stable. Eventually, permacath ceased to ooze blood. Heparin drip was held beginning at midnight prior to right upper extremity arteriovenous fistula placement. Patient tolerated and recovered from the procedure well. Patient was bridged back to coumadin with heparin drip. 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 Of note, patient was discharged on 10/18/2017 after a long and complicated hospital stay where he initially presented with non-ST segment elevation myocardial infarction and pulmonary edema and was transferred to [facility] on 9/12/2017 for left heart catheterization during which 3 drug eluting stents were placed. Patient's course was complicated by acute renal failure requiring dialysis, code blue, new-onset atrial fibrillation, new-onset heart failure with reduced ejection fraction, embolic stroke which left patient nonverbal, debilitated, and with groin hematoma, and sepsis secondary to pyelonephritis. Patient was eventually stabilized, placed on a dysphagia 1 diet, and underwent permacath placement for dialysis. Patient's family declined permanent pacemaker and patient was sent to rehab. While at a dialysis session in rehab, patient was noted to be hypotensive and was brought to the emergency department. In the emergency department, patient was hypotensive with systolic blood pressure in the 90s but was otherwise hemodynamically stable. Patient did not have leukocytosis. Chest x-ray demonstrated no acute disease. Patient was admitted to medicine for further management. Patient was found to have a diffuse maculopapular rash that resolved with Lotrisone cream. Initial blood cultures grew gram variable rods and coagulase negative staphylococcus. Patient was started on empiric antibiotic therapy with vancomycin and zosyn and blood cultures were repeated per Infectious Disease recommendations. CT scan of the abdomen and pelvis demonstrated no intra-abdominal source of bacteremia. Nephrology was consulted to arrange dialysis. Repeat blood cultures came back negative so antibiotics were discontinued. However, surveillance blood cultures grew coagulase negative staphylococcus so vancomycin after dialysis sessions was started. Subsequent blood cultures were negative. Patient completed course of vancomycin. Vascular Surgery was consulted for placement of arteriovenous fistula for dialysis. Repeat surveillance blood cultures, which were obtained per Infectious Disease recommendations, grew coagulase negative staphylococcus. Due to concern that permacath was the source of patient's bacteremia, permacath was removed by Interventional Radiology. Subsequent blood cultures came back negative. Intervention Radiology placed a new permacath which oozed clotted blood. Patient was placed on heparin drip in preparation for arteriovenous fistula placement. Permacath continued to ooze clotted blood but patient was hemodynamically stable. Eventually, permacath ceased to ooze blood. Patient tolerated and recovered from the procedure well. Patient was bridged back to coumadin with heparin drip. Given the risk of Bleeding heparin was stopped, last INR:1.71. Will need daily INR with goal INR 2-3. Patient will need wound care for Stage 3 pressure ulcer. 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 Of note, patient was discharged on 10/18/2017 after a long and complicated hospital stay where he initially presented with non-ST segment elevation myocardial infarction and pulmonary edema and was transferred to [facility] on 9/12/2017 for left heart catheterization during which 3 drug eluting stents were placed. Patient's course was complicated by acute renal failure requiring dialysis, code blue, new-onset atrial fibrillation, new-onset heart failure with reduced ejection fraction, embolic stroke which left patient nonverbal, debilitated, and with groin hematoma, and sepsis secondary to pyelonephritis. Patient was eventually stabilized, placed on a dysphagia 1 diet, and underwent permacath placement for dialysis. Patient's family declined permanent pacemaker and patient was sent to rehab. While at a dialysis session in rehab, patient was noted to be hypotensive and was brought to the emergency department. In the emergency department, patient was hypotensive with systolic blood pressure in the 90s but was otherwise hemodynamically stable. Patient did not have leukocytosis. Chest x-ray demonstrated no acute disease. Patient was admitted to medicine for further management. Patient was found to have a diffuse maculopapular rash that resolved with Lotrisone cream. Initial blood cultures grew gram variable rods and coagulase negative staphylococcus. Patient was started on empiric antibiotic therapy with vancomycin and zosyn and blood cultures were repeated per Infectious Disease recommendations. CT scan of the abdomen and pelvis demonstrated no intra-abdominal source of bacteremia. Nephrology was consulted to arrange dialysis. Repeat blood cultures came back negative so antibiotics were discontinued. However, surveillance blood cultures grew coagulase negative staphylococcus so vancomycin after dialysis sessions was started. Subsequent blood cultures were negative. Patient completed course of vancomycin. Vascular Surgery was consulted for placement of arteriovenous fistula for dialysis. Repeat surveillance blood cultures, which were obtained per Infectious Disease recommendations, grew coagulase negative staphylococcus. Due to concern that permacath was the source of patient's bacteremia, permacath was removed by Interventional Radiology. Subsequent blood cultures came back negative. Intervention Radiology placed a new permacath which oozed clotted blood. Patient was placed on heparin drip in preparation for arteriovenous fistula placement. Permacath continued to ooze clotted blood but patient was hemodynamically stable. Eventually, permacath ceased to ooze blood. Patient tolerated and recovered from the procedure well. Patient was bridged back to coumadin with heparin drip. Given the risk of Bleeding heparin was stopped, last INR:1.71. Will need daily INR with goal INR 2-3. Patient will need wound care for Stage 3 pressure ulcer. Patient has poor prognosis.

## 2017-10-24 NOTE — DISCHARGE NOTE ADULT - MEDICATION SUMMARY - MEDICATIONS TO TAKE
I will START or STAY ON the medications listed below when I get home from the hospital:    aspirin 81 mg oral tablet, chewable  -- 1 tab(s) by mouth once a day  -- Indication: For Prophylactic measure    tamsulosin 0.4 mg oral capsule  -- 1 cap(s) by mouth once a day (at bedtime)  -- Indication: For BPH (benign prostatic hyperplasia)    amiodarone 200 mg oral tablet  -- 1 tab(s) by mouth once a day  -- Indication: For Cardiac    Coumadin 3 mg oral tablet  -- 1 tab(s) by mouth once a day  start tomorrow  -- Indication: For Paroxysmal atrial fibrillation    insulin lispro 100 units/mL subcutaneous solution  --  subcutaneous 4 times a day (before meals and at bedtime); 1 Unit(s) if Glucose 151 - 200  2 Unit(s) if Glucose 201 - 250  3 Unit(s) if Glucose 251 - 300  4 Unit(s) if Glucose 301 - 350  5 Unit(s) if Glucose 351 - 400  6 Unit(s) if Glucose Greater Than 400  -- Indication: For Diabetes mellitus    atorvastatin 40 mg oral tablet  -- 1 tab(s) by mouth once a day (at bedtime)  -- Indication: For High cholesterol    clopidogrel 75 mg oral tablet  -- 1 tab(s) by mouth once a day  -- Indication: For Cad    busPIRone 5 mg oral tablet  -- 1 tab(s) by mouth 2 times a day  -- Indication: For Depression    metoprolol tartrate 25 mg oral tablet  -- 1 tab(s) by mouth 2 times a day  -- Indication: For Paroxysmal atrial fibrillation    Breo Ellipta 100 mcg-25 mcg/inh inhalation powder  -- 1 puff(s) inhaled once a day, home  -- Indication: For COPD (chronic obstructive pulmonary disease)    bacitracin 500 units/g topical ointment  -- 1 application on skin 2 times a day  -- Indication: For wound    clotrimazole-betamethasone dipropionate 1%-0.05% topical cream  -- 1 application on skin 2 times a day  -- Indication: For Rash    epoetin georgie  -- 83339 unit(s) ivp every other day intradialysis  -- Indication: For anemia    ocular lubricant ophthalmic solution  -- 1 drop(s) to each affected eye 4 times a day  -- Indication: For Dry eyes    sevelamer hydrochloride 800 mg oral tablet  -- 2 tab(s) by mouth 3 times a day (with meals)  -- Indication: For ESRD (end stage renal disease) on dialysis    pantoprazole 40 mg oral granule, enteric coated  -- 1 dose(s) by mouth once a day  -- Indication: For acid reflux    fluticasone CFC free 110 mcg/inh inhalation aerosol  -- 1 puff(s) inhaled once a day  -- Indication: For allergies    Nephro-Rachel oral tablet  -- 1 tab(s) by mouth once a day  -- Indication: For Supplement    Vitamin D3 50,000 intl units oral capsule  -- 1 cap(s) by mouth once a month  -- Indication: For Supplement I will START or STAY ON the medications listed below when I get home from the hospital:    aspirin 81 mg oral tablet, chewable  -- 1 tab(s) by mouth once a day  -- Indication: For Prophylactic measure    tamsulosin 0.4 mg oral capsule  -- 1 cap(s) by mouth once a day (at bedtime)  -- Indication: For BPH (benign prostatic hyperplasia)    amiodarone 200 mg oral tablet  -- 1 tab(s) by mouth once a day  -- Indication: For Cardiac    Coumadin 3 mg oral tablet  -- 1 tab(s) by mouth once a day  start tomorrow  -- Indication: For Paroxysmal atrial fibrillation    insulin lispro 100 units/mL subcutaneous solution  --  subcutaneous 4 times a day (before meals and at bedtime); 1 Unit(s) if Glucose 151 - 200  2 Unit(s) if Glucose 201 - 250  3 Unit(s) if Glucose 251 - 300  4 Unit(s) if Glucose 301 - 350  5 Unit(s) if Glucose 351 - 400  6 Unit(s) if Glucose Greater Than 400  -- Indication: For Diabetes mellitus    atorvastatin 40 mg oral tablet  -- 1 tab(s) by mouth once a day (at bedtime)  -- Indication: For High cholesterol    clopidogrel 75 mg oral tablet  -- 1 tab(s) by mouth once a day  -- Indication: For Cad    busPIRone 5 mg oral tablet  -- 1 tab(s) by mouth 2 times a day  -- Indication: For Depression    metoprolol tartrate 25 mg oral tablet  -- 1 tab(s) by mouth 2 times a day  -- Indication: For Paroxysmal atrial fibrillation    Breo Ellipta 100 mcg-25 mcg/inh inhalation powder  -- 1 puff(s) inhaled once a day, home  -- Indication: For COPD (chronic obstructive pulmonary disease)    bacitracin 500 units/g topical ointment  -- 1 application on skin 2 times a day  -- Indication: For Hand abrasion    clotrimazole-betamethasone dipropionate 1%-0.05% topical cream  -- 1 application on skin 2 times a day  -- Indication: For Rash    epoetin georgie  -- 11018 unit(s) ivp every other day intradialysis  -- Indication: For anemia    ocular lubricant ophthalmic solution  -- 1 drop(s) to each affected eye 4 times a day  -- Indication: For Dry eyes    sevelamer hydrochloride 800 mg oral tablet  -- 2 tab(s) by mouth 3 times a day (with meals)  -- Indication: For ESRD (end stage renal disease) on dialysis    pantoprazole 40 mg oral granule, enteric coated  -- 1 dose(s) by mouth once a day  -- Indication: For acid reflux    fluticasone CFC free 110 mcg/inh inhalation aerosol  -- 1 puff(s) inhaled once a day  -- Indication: For allergies    Nephro-Rachel oral tablet  -- 1 tab(s) by mouth once a day  -- Indication: For Supplement    Vitamin D3 50,000 intl units oral capsule  -- 1 cap(s) by mouth once a month  -- Indication: For Supplement I will START or STAY ON the medications listed below when I get home from the hospital:    aspirin 81 mg oral tablet, chewable  -- 1 tab(s) by mouth once a day  -- Indication: For Prophylactic measure    tamsulosin 0.4 mg oral capsule  -- 1 cap(s) by mouth once a day (at bedtime)  -- Indication: For BPH (benign prostatic hyperplasia)    amiodarone 200 mg oral tablet  -- 1 tab(s) by mouth once a day  -- Indication: For Cardiac    Coumadin 3 mg oral tablet  -- 1 tab(s) by mouth once a day  start tomorrow  -- Indication: For Paroxysmal atrial fibrillation    insulin lispro 100 units/mL subcutaneous solution  --  subcutaneous 4 times a day (before meals and at bedtime); 1 Unit(s) if Glucose 151 - 200  2 Unit(s) if Glucose 201 - 250  3 Unit(s) if Glucose 251 - 300  4 Unit(s) if Glucose 301 - 350  5 Unit(s) if Glucose 351 - 400  6 Unit(s) if Glucose Greater Than 400  -- Indication: For Diabetes mellitus    atorvastatin 40 mg oral tablet  -- 1 tab(s) by mouth once a day (at bedtime)  -- Indication: For High cholesterol    clopidogrel 75 mg oral tablet  -- 1 tab(s) by mouth once a day  -- Indication: For Cad    busPIRone 5 mg oral tablet  -- 1 tab(s) by mouth 2 times a day  -- Indication: For Depression    metoprolol tartrate 25 mg oral tablet  -- 1 tab(s) by mouth 2 times a day  -- Indication: For Paroxysmal atrial fibrillation    Breo Ellipta 100 mcg-25 mcg/inh inhalation powder  -- 1 puff(s) inhaled once a day, home  -- Indication: For COPD (chronic obstructive pulmonary disease)    DuoNeb 0.5 mg-2.5 mg/3 mL inhalation solution  -- 3 milliliter(s) inhaled 4 times a day, As Needed sob, wheezing  -- Indication: For Chronic obstructive pulmonary disease, unspecified COPD type    bacitracin 500 units/g topical ointment  -- 1 application on skin 2 times a day  -- Indication: For Hand abrasion    clotrimazole-betamethasone dipropionate 1%-0.05% topical cream  -- 1 application on skin 2 times a day  -- Indication: For Rash    epoetin georgie  -- 49472 unit(s) ivp every other day intradialysis  -- Indication: For anemia    ocular lubricant ophthalmic solution  -- 1 drop(s) to each affected eye 4 times a day  -- Indication: For Dry eyes    sevelamer hydrochloride 800 mg oral tablet  -- 2 tab(s) by mouth 3 times a day (with meals)  -- Indication: For ESRD (end stage renal disease) on dialysis    pantoprazole 40 mg oral granule, enteric coated  -- 1 dose(s) by mouth once a day  -- Indication: For acid reflux    fluticasone CFC free 110 mcg/inh inhalation aerosol  -- 1 puff(s) inhaled once a day  -- Indication: For allergies    Nephro-Rachel oral tablet  -- 1 tab(s) by mouth once a day  -- Indication: For Supplement    Vitamin D3 50,000 intl units oral capsule  -- 1 cap(s) by mouth once a month  -- Indication: For Supplement

## 2017-10-24 NOTE — DISCHARGE NOTE ADULT - NS AS DC STROKE ED MATERIALS
Call 911 for Stroke/Risk Factors for Stroke/Prescribed Medications/Need for Followup After Discharge/Stroke Education Booklet/Stroke Warning Signs and Symptoms/pt provided with stroke packet pt provided with stroke packet

## 2017-10-24 NOTE — PROGRESS NOTE ADULT - PROBLEM SELECTOR PLAN 4
though pt has depressed t's on ekg trop is dramatically lower than last troponin  will monitor on telemetry  - cont asa/plavix

## 2017-10-24 NOTE — PROGRESS NOTE ADULT - SUBJECTIVE AND OBJECTIVE BOX
Seen on HD, non-verbal    Vital Signs Last 24 Hrs  T(C): 37 (10-24-17 @ 12:15), Max: 37 (10-23-17 @ 21:01)  T(F): 98.6 (10-24-17 @ 12:15), Max: 98.6 (10-23-17 @ 21:01)  HR: 101 (10-24-17 @ 12:15) (80 - 101)  BP: 134/81 (10-24-17 @ 12:15) (115/70 - 139/74)  BP(mean): --  RR: 18 (10-24-17 @ 12:15) (18 - 18)  SpO2: 98% (10-24-17 @ 12:15) (96% - 99%)    Respiratory: b/l air entry, clear  Cardiovascular: S1 S2 regular  Gastrointestinal: soft, NT, BS present  Extremities: no edema     ALBUTerol    90 MICROgram(s) HFA Inhaler 2 Puff(s) Inhalation every 6 hours PRN  amiodarone    Tablet 200 milliGRAM(s) Oral daily  artificial  tears Solution 1 Drop(s) Both EYES four times a day  aspirin enteric coated 81 milliGRAM(s) Oral daily  atorvastatin 40 milliGRAM(s) Oral at bedtime  busPIRone 5 milliGRAM(s) Oral two times a day  clopidogrel Tablet 75 milliGRAM(s) Oral daily  clotrimazole/betamethasone Cream 1 Application(s) Topical two times a day  dextrose 5%. 1000 milliLiter(s) IV Continuous <Continuous>  dextrose 50% Injectable 12.5 Gram(s) IV Push once  dextrose 50% Injectable 25 Gram(s) IV Push once  dextrose 50% Injectable 25 Gram(s) IV Push once  dextrose Gel 1 Dose(s) Oral once PRN  fluticasone propionate   110 MICROgram(s) HFA Inhaler 1 Puff(s) Inhalation daily  glucagon  Injectable 1 milliGRAM(s) IntraMuscular once PRN  insulin lispro (HumaLOG) corrective regimen sliding scale   SubCutaneous three times a day before meals  pantoprazole   Suspension 40 milliGRAM(s) Oral before breakfast  sevelamer carbonate Powder 1600 milliGRAM(s) Oral three times a day with meals  tamsulosin 0.4 milliGRAM(s) Oral at bedtime                          9.7    6.7   )-----------( 208      ( 24 Oct 2017 07:15 )             29.3     24 Oct 2017 07:15    143    |  99     |  82     ----------------------------<  97     4.1     |  22     |  7.06     Ca    8.9        24 Oct 2017 07:15  Phos  5.6       24 Oct 2017 07:15  Mg     2.2       24 Oct 2017 07:15    TPro  6.5    /  Alb  3.2    /  TBili  0.5    /  DBili  x      /  AST  27     /  ALT  29     /  AlkPhos  85     23 Oct 2017 06:10    LIVER FUNCTIONS - ( 23 Oct 2017 06:10 )  Alb: 3.2 g/dL / Pro: 6.5 g/dL / ALK PHOS: 85 U/L / ALT: 29 U/L RC / AST: 27 U/L / GGT: x           PT/INR - ( 24 Oct 2017 07:15 )   PT: 14.9 sec;   INR: 1.37 ratio        Assessment and Recommendation:     ESRD on HD  HD TTS, TMP as able  Procrit w/HD  HD cath site clean, no erythema, no d/c  Adm w/bacteremia, ? contam  Off Abx per ID  D/c planning to [facility] when ready

## 2017-10-24 NOTE — DISCHARGE NOTE ADULT - SECONDARY DIAGNOSIS.
ESRD (end stage renal disease) on dialysis Paroxysmal atrial fibrillation Diabetes mellitus Coronary artery disease CVA (cerebral vascular accident) COPD (chronic obstructive pulmonary disease)

## 2017-10-24 NOTE — PROGRESS NOTE ADULT - ASSESSMENT
72 year old male with PMH of DM, HTN, HLD, Embolic CVA with right hemiplegia, COPD, bipolar, bilateral nephrolithiasis and bladder stones, ASHD, discharged 10/18 after long hospital stay when initially presented with NSTEMI/pulm edema, UTI, VT s/p shock, embolic CVA, new AF, re-admitted after hypotensive episode in dialysis found with bacteremia x2 likely contaminants

## 2017-10-24 NOTE — DISCHARGE NOTE ADULT - PLAN OF CARE
Please follow up with a primary care provider within one week of discharge. During your hospitalization, multiple blood cultures that were drawn grew coagulase negative staphylococcus. The permacath that you had when you came to the hospital was removed and replaced with a new permacath. Please seek medical attention if you develop a high fever or worsening mental status. Please follow up with your primary care provider within one week of your discharge from the hospital. Please follow up with a primary care provider within one week of discharge as well as vascular surgeon, Dr. Saez, and your nephrologist. You have a history of end stage renal disease which means that your kidney function is significantly reduced. You had an arteriovenous fistula placed in your right arm by Vascular Surgery. Please continue with dialysis treatments three times per week. Please consume a renal diet with Nepro cans three times daily. Please follow up with your primary care provider within one week of your discharge from the hospital as well as your nephrologist, Dr. Solis, and vascular surgeon, Dr. Saez. Please follow up with a primary care provider within one week of discharge as well as a cardiologist. You have a history of atrial fibrillation which means that you have an irregular heart rate and rhythm. Please take your medication as prescribed. Please seek medical attention if you notice your heart is beating too fast. Please follow up with your primary care provider within one week of your discharge from the hospital as well as a cardiologist. You have a history of diabetes mellitus which means that your blood sugar levels are high. Please limit dietary sugar consumption. Please follow up with your primary care provider within one week of your discharge from the hospital. You have a history of heart disease. Please take your medications as prescribed. Please seek medical attention if you develop severe chest pain, nausea, vomiting, sweating, or left shoulder, arm, neck, or jaw pain. Please follow up with your primary care provider within one week of your discharge from the hospital as well as a cardiologist. You have a history of stroke. Please take your medications as prescribed. Please seek medical attention if you develop muscle weakness, numbness, tingling, or vision changes. Please follow up with your primary care provider within one week of your discharge from the hospital. You have a history of chronic obstructive pulmonary disease. Please take your medications as prescribed. Please seek medical attention if you develop severe cough or shortness of breath. Please follow up with your primary care provider within one week of your discharge from the hospital. Gram positive bacteremia with hypotension and sever sepsis on presentation.   During your hospitalization, multiple blood cultures that were drawn grew coagulase negative staphylococcus. The PermCath that you had when you came to the hospital was removed and replaced with a new permacath. Please seek medical attention if you develop a high fever or worsening mental status. Please follow up with your primary care provider within one week of your discharge from the hospital. You have a history of atrial fibrillation which means that you have an irregular heart rate and rhythm. Please take your medication as prescribed. Please seek medical attention if you notice your heart is beating too fast. Please follow up with your primary care provider within one week of your discharge from the hospital as well as a cardiologist. Will need daily INR with goal INR of 2-3 Gram positive bacteremia with hypotension and sever sepsis on admission.   During your hospitalization, multiple blood cultures that were drawn grew coagulase negative staphylococcus. The PermCath that you had when you came to the hospital was removed and replaced with a new permacath. Please seek medical attention if you develop a high fever or worsening mental status. Please follow up with your primary care provider within one week of your discharge from the hospital. You have a history of diabetes mellitus which means that your blood sugar levels are high. Please limit dietary sugar consumption. Please follow up with your primary care provider within one week of your discharge from the hospital. Daily INR with goal INR 2-3

## 2017-10-24 NOTE — PROGRESS NOTE ADULT - SUBJECTIVE AND OBJECTIVE BOX
[facility] Medicine Intern: Oscar Lorenzo  Spectralink: [id number]  Pager: [phone]    HPI/INTERVAL HISTORY: Overnight, no acute events. Patient seen and examined at bedside this AM. Patient is nonverbal at baseline now. Patient tolerated HD today with no hypotension recorded.     OBJECTIVE:  VITAL SIGNS:  ICU Vital Signs Last 24 Hrs  T(C): 37.1 (24 Oct 2017 12:50), Max: 37.1 (24 Oct 2017 12:50)  T(F): 98.8 (24 Oct 2017 12:50), Max: 98.8 (24 Oct 2017 12:50)  HR: 97 (24 Oct 2017 12:50) (82 - 101)  BP: 121/77 (24 Oct 2017 12:50) (120/75 - 139/74)  BP(mean): --  ABP: --  ABP(mean): --  RR: 18 (24 Oct 2017 12:50) (18 - 18)  SpO2: 100% (24 Oct 2017 12:50) (96% - 100%)        10-23 @ 07:01  -  10-24 @ 07:00  --------------------------------------------------------  IN: 360 mL / OUT: 0 mL / NET: 360 mL    10-24 @ 07:01  -  10-24 @ 15:22  --------------------------------------------------------  IN: 120 mL / OUT: 1000 mL / NET: -880 mL      CAPILLARY BLOOD GLUCOSE      POCT Blood Glucose.: 98 mg/dL (24 Oct 2017 12:42)      PHYSICAL EXAM:  GENERAL: NAD, well-developed  HEAD:  Atraumatic, Normocephalic  EYES: conjunctiva and sclera clear  NECK: Supple, No JVD  CHEST/LUNG: Clear to auscultation bilaterally; No wheeze  HEART: Regular rate and rhythm; No murmurs, rubs, or gallops  ABDOMEN: Soft, Nontender, Nondistended; Bowel sounds present  EXTREMITIES:  2+ Peripheral Pulses, No clubbing, cyanosis, or edema  PSYCH: awake and alert, not following commands  SKIN: small area of maculopapualr rash around R axilla, but seems resolved. sacral decubitus stage 3 ulcer present, permacath site in R anterior chest wall in place    LABS:                        9.7    6.7   )-----------( 208      ( 24 Oct 2017 07:15 )             29.3     10-24    143  |  99  |  82<H>  ----------------------------<  97  4.1   |  22  |  7.06<H>    Ca    8.9      24 Oct 2017 07:15  Phos  5.6     10-24  Mg     2.2     10-24    TPro  6.5  /  Alb  3.2<L>  /  TBili  0.5  /  DBili  x   /  AST  27  /  ALT  29  /  AlkPhos  85  10-23    LIVER FUNCTIONS - ( 23 Oct 2017 06:10 )  Alb: 3.2 g/dL / Pro: 6.5 g/dL / ALK PHOS: 85 U/L / ALT: 29 U/L RC / AST: 27 U/L / GGT: x           PT/INR - ( 24 Oct 2017 07:15 )   PT: 14.9 sec;   INR: 1.37 ratio         PTT - ( 24 Oct 2017 07:15 )  PTT:30.7 sec          RADIOLOGY & ADDITIONAL TESTS: Reviewed.    ALBUTerol    90 MICROgram(s) HFA Inhaler 2 Puff(s) Inhalation every 6 hours PRN  amiodarone    Tablet 200 milliGRAM(s) Oral daily  artificial  tears Solution 1 Drop(s) Both EYES four times a day  aspirin enteric coated 81 milliGRAM(s) Oral daily  atorvastatin 40 milliGRAM(s) Oral at bedtime  busPIRone 5 milliGRAM(s) Oral two times a day  clopidogrel Tablet 75 milliGRAM(s) Oral daily  clotrimazole/betamethasone Cream 1 Application(s) Topical two times a day  dextrose 5%. 1000 milliLiter(s) IV Continuous <Continuous>  dextrose 50% Injectable 12.5 Gram(s) IV Push once  dextrose 50% Injectable 25 Gram(s) IV Push once  dextrose 50% Injectable 25 Gram(s) IV Push once  dextrose Gel 1 Dose(s) Oral once PRN  epoetin georgie Injectable 79066 Unit(s) IV Push <User Schedule>  fluticasone propionate   110 MICROgram(s) HFA Inhaler 1 Puff(s) Inhalation daily  glucagon  Injectable 1 milliGRAM(s) IntraMuscular once PRN  insulin lispro (HumaLOG) corrective regimen sliding scale   SubCutaneous three times a day before meals  pantoprazole   Suspension 40 milliGRAM(s) Oral before breakfast  sevelamer carbonate Powder 1600 milliGRAM(s) Oral three times a day with meals  tamsulosin 0.4 milliGRAM(s) Oral at bedtime      No Known Allergies

## 2017-10-24 NOTE — DISCHARGE NOTE ADULT - CARE PROVIDER_API CALL
Elif Solis), Nephrology  [address]  [place], [place] [id number]  Phone: [phone]  Fax: [phone]    Carl Saez), Surgery  Vascular  [address]  [address]  [place] [address]  Phone: [phone]  Fax: [phone]

## 2017-10-24 NOTE — DISCHARGE NOTE ADULT - ADDITIONAL INSTRUCTIONS
Please follow up with your primary care provider within one week of your discharge from hospital as well as your nephrologist, Dr. Solis, and vascular surgeon, Dr. Saez.  Please schedule an appointment with the Wound Center on [address] [facility] ([phone]) for follow up of your stage 3 sacral pressure ulcer. Please follow up with your primary care provider within one week of your discharge from hospital as well as your nephrologist, Dr. Solis, and vascular surgeon, Dr. Saez.  Please schedule an appointment with the Wound Center on [address] [facility] ([phone]) for follow up of your stage 3 sacral pressure ulcer.  Wound care- Cleanse w/ NS. Pat dry. CAVILON to periwound skin . AQUACEL + gauze + tegaderm to wound Please follow up with your primary care provider within one week of your discharge from hospital as well as your nephrologist, Dr. Solis, and vascular surgeon, Dr. Saez.  Please schedule an appointment with the Wound Center on [address] [facility] ([phone]) for follow up of your stage 3 sacral pressure ulcer.  Wound care- Cleanse with Normal saline. Pat dry. CAVILON to periwound skin . Place aquacel,  gauze and tegaderm to wound **PLEASE CHECK INR TOMORROW- PT IS ON COUMADIN**  Please follow up with your primary care provider within one week of your discharge from hospital as well as your nephrologist, Dr. Solis, and vascular surgeon, Dr. Saez.  Please schedule an appointment with the Wound Center on [address] [facility] ([phone]) for follow up of your stage 3 sacral pressure ulcer.  Wound care- Cleanse with Normal saline. Pat dry. CAVILON to periwound skin . Place aquacel,  gauze and tegaderm to wound

## 2017-10-24 NOTE — PROGRESS NOTE ADULT - PROBLEM SELECTOR PLAN 1
Prelim blood cultures with bacillus/coag negative staph likely contaminants- patient with hypotension in HD.  ID called for consult, repeat blood culture - source can be sacral decub vs. urine  - maculopapular resolving  - CT A/P negative, CXR negative  Per ID will d/c antibiotics and observe Prelim blood cultures with bacillus/coag negative staph likely contaminants- patient with hypotension in HD.  - repeat BCx negative (In total, BCx negative x4)  - maculopapular resolved  - CT A/P negative, CXR negative  - antibiotics discontinued on 10/23 no evidence of an infection Prelim blood cultures with bacillus/coag negative staph likely contaminants- patient with hypotension in HD.  - repeat BCx negative (In total, BCx negative x4)  - maculopapular resolved  - CT A/P negative, CXR negative  - antibiotics discontinued on 10/23 no evidence of an infection  will repeat blood culture and monitor off antibiotics

## 2017-10-25 LAB
ANION GAP SERPL CALC-SCNC: 19 MMOL/L — HIGH (ref 5–17)
APTT BLD: 31.1 SEC — SIGNIFICANT CHANGE UP (ref 27.5–37.4)
BASOPHILS # BLD AUTO: 0.1 K/UL — SIGNIFICANT CHANGE UP (ref 0–0.2)
BASOPHILS NFR BLD AUTO: 0.7 % — SIGNIFICANT CHANGE UP (ref 0–2)
BUN SERPL-MCNC: 46 MG/DL — HIGH (ref 7–23)
CALCIUM SERPL-MCNC: 8.9 MG/DL — SIGNIFICANT CHANGE UP (ref 8.4–10.5)
CHLORIDE SERPL-SCNC: 98 MMOL/L — SIGNIFICANT CHANGE UP (ref 96–108)
CO2 SERPL-SCNC: 25 MMOL/L — SIGNIFICANT CHANGE UP (ref 22–31)
CREAT SERPL-MCNC: 4.77 MG/DL — HIGH (ref 0.5–1.3)
EOSINOPHIL # BLD AUTO: 0.3 K/UL — SIGNIFICANT CHANGE UP (ref 0–0.5)
EOSINOPHIL NFR BLD AUTO: 3.6 % — SIGNIFICANT CHANGE UP (ref 0–6)
GLUCOSE BLDC GLUCOMTR-MCNC: 123 MG/DL — HIGH (ref 70–99)
GLUCOSE BLDC GLUCOMTR-MCNC: 126 MG/DL — HIGH (ref 70–99)
GLUCOSE BLDC GLUCOMTR-MCNC: 204 MG/DL — HIGH (ref 70–99)
GLUCOSE BLDC GLUCOMTR-MCNC: 220 MG/DL — HIGH (ref 70–99)
GLUCOSE SERPL-MCNC: 121 MG/DL — HIGH (ref 70–99)
GRAM STN FLD: SIGNIFICANT CHANGE UP
GRAM STN FLD: SIGNIFICANT CHANGE UP
HCT VFR BLD CALC: 30.7 % — LOW (ref 39–50)
HGB BLD-MCNC: 9.9 G/DL — LOW (ref 13–17)
INR BLD: 1.3 RATIO — HIGH (ref 0.88–1.16)
LYMPHOCYTES # BLD AUTO: 1.5 K/UL — SIGNIFICANT CHANGE UP (ref 1–3.3)
LYMPHOCYTES # BLD AUTO: 20.8 % — SIGNIFICANT CHANGE UP (ref 13–44)
MAGNESIUM SERPL-MCNC: 2.2 MG/DL — SIGNIFICANT CHANGE UP (ref 1.6–2.6)
MCHC RBC-ENTMCNC: 29.3 PG — SIGNIFICANT CHANGE UP (ref 27–34)
MCHC RBC-ENTMCNC: 32.4 GM/DL — SIGNIFICANT CHANGE UP (ref 32–36)
MCV RBC AUTO: 90.6 FL — SIGNIFICANT CHANGE UP (ref 80–100)
MONOCYTES # BLD AUTO: 0.6 K/UL — SIGNIFICANT CHANGE UP (ref 0–0.9)
MONOCYTES NFR BLD AUTO: 8.5 % — SIGNIFICANT CHANGE UP (ref 2–14)
NEUTROPHILS # BLD AUTO: 4.9 K/UL — SIGNIFICANT CHANGE UP (ref 1.8–7.4)
NEUTROPHILS NFR BLD AUTO: 66.4 % — SIGNIFICANT CHANGE UP (ref 43–77)
PHOSPHATE SERPL-MCNC: 3.9 MG/DL — SIGNIFICANT CHANGE UP (ref 2.5–4.5)
PLATELET # BLD AUTO: 230 K/UL — SIGNIFICANT CHANGE UP (ref 150–400)
POTASSIUM SERPL-MCNC: 3.7 MMOL/L — SIGNIFICANT CHANGE UP (ref 3.5–5.3)
POTASSIUM SERPL-SCNC: 3.7 MMOL/L — SIGNIFICANT CHANGE UP (ref 3.5–5.3)
PROTHROM AB SERPL-ACNC: 14.2 SEC — HIGH (ref 9.8–12.7)
RBC # BLD: 3.39 M/UL — LOW (ref 4.2–5.8)
RBC # FLD: 13.4 % — SIGNIFICANT CHANGE UP (ref 10.3–14.5)
SODIUM SERPL-SCNC: 142 MMOL/L — SIGNIFICANT CHANGE UP (ref 135–145)
SPECIMEN SOURCE: SIGNIFICANT CHANGE UP
WBC # BLD: 7.4 K/UL — SIGNIFICANT CHANGE UP (ref 3.8–10.5)
WBC # FLD AUTO: 7.4 K/UL — SIGNIFICANT CHANGE UP (ref 3.8–10.5)

## 2017-10-25 PROCEDURE — 99233 SBSQ HOSP IP/OBS HIGH 50: CPT | Mod: GC

## 2017-10-25 RX ORDER — PANTOPRAZOLE SODIUM 20 MG/1
40 TABLET, DELAYED RELEASE ORAL
Qty: 0 | Refills: 0 | Status: DISCONTINUED | OUTPATIENT
Start: 2017-10-25 | End: 2017-11-03

## 2017-10-25 RX ORDER — VANCOMYCIN HCL 1 G
1000 VIAL (EA) INTRAVENOUS ONCE
Qty: 0 | Refills: 0 | Status: COMPLETED | OUTPATIENT
Start: 2017-10-25 | End: 2017-10-25

## 2017-10-25 RX ORDER — INSULIN LISPRO 100/ML
VIAL (ML) SUBCUTANEOUS
Qty: 0 | Refills: 0 | Status: DISCONTINUED | OUTPATIENT
Start: 2017-10-25 | End: 2017-11-16

## 2017-10-25 RX ADMIN — CLOTRIMAZOLE AND BETAMETHASONE DIPROPIONATE 1 APPLICATION(S): 10; .5 CREAM TOPICAL at 17:53

## 2017-10-25 RX ADMIN — Medication 1 DROP(S): at 06:38

## 2017-10-25 RX ADMIN — ATORVASTATIN CALCIUM 40 MILLIGRAM(S): 80 TABLET, FILM COATED ORAL at 21:40

## 2017-10-25 RX ADMIN — Medication 1 DROP(S): at 17:52

## 2017-10-25 RX ADMIN — Medication 5 MILLIGRAM(S): at 17:54

## 2017-10-25 RX ADMIN — Medication 250 MILLIGRAM(S): at 19:01

## 2017-10-25 RX ADMIN — SEVELAMER CARBONATE 1600 MILLIGRAM(S): 2400 POWDER, FOR SUSPENSION ORAL at 09:21

## 2017-10-25 RX ADMIN — PANTOPRAZOLE SODIUM 40 MILLIGRAM(S): 20 TABLET, DELAYED RELEASE ORAL at 06:38

## 2017-10-25 RX ADMIN — AMIODARONE HYDROCHLORIDE 200 MILLIGRAM(S): 400 TABLET ORAL at 06:37

## 2017-10-25 RX ADMIN — Medication 1 DROP(S): at 00:11

## 2017-10-25 RX ADMIN — Medication 1 PUFF(S): at 12:54

## 2017-10-25 RX ADMIN — SEVELAMER CARBONATE 1600 MILLIGRAM(S): 2400 POWDER, FOR SUSPENSION ORAL at 17:52

## 2017-10-25 RX ADMIN — CLOTRIMAZOLE AND BETAMETHASONE DIPROPIONATE 1 APPLICATION(S): 10; .5 CREAM TOPICAL at 06:38

## 2017-10-25 RX ADMIN — TAMSULOSIN HYDROCHLORIDE 0.4 MILLIGRAM(S): 0.4 CAPSULE ORAL at 21:40

## 2017-10-25 RX ADMIN — Medication 2: at 21:41

## 2017-10-25 RX ADMIN — SEVELAMER CARBONATE 1600 MILLIGRAM(S): 2400 POWDER, FOR SUSPENSION ORAL at 12:53

## 2017-10-25 RX ADMIN — Medication 5 MILLIGRAM(S): at 06:37

## 2017-10-25 RX ADMIN — Medication 2: at 13:43

## 2017-10-25 RX ADMIN — Medication 81 MILLIGRAM(S): at 12:52

## 2017-10-25 RX ADMIN — CLOPIDOGREL BISULFATE 75 MILLIGRAM(S): 75 TABLET, FILM COATED ORAL at 12:52

## 2017-10-25 RX ADMIN — Medication 1 TABLET(S): at 21:40

## 2017-10-25 RX ADMIN — Medication 1 DROP(S): at 12:53

## 2017-10-25 NOTE — PHYSICAL THERAPY INITIAL EVALUATION ADULT - IMPAIRMENTS CONTRIBUTING IMPAIRED BED MOBILITY, REHAB EVAL
impaired coordination/impaired motor control/abnormal muscle tone/decreased strength/decreased flexibility

## 2017-10-25 NOTE — PHYSICAL THERAPY INITIAL EVALUATION ADULT - BALANCE DISTURBANCE, IDENTIFIED IMPAIRMENT CONTRIBUTE, REHAB EVAL
abnormal muscle tone/impaired coordination/decreased strength/impaired postural control/impaired motor control

## 2017-10-25 NOTE — PHYSICAL THERAPY INITIAL EVALUATION ADULT - MANUAL MUSCLE TESTING RESULTS, REHAB EVAL
unable to formally assess due to pt unable to follow multi step commands. No movement noted b/l UE, at lest 2-/5 L elbow flexion and finger flexion, at least 2-/5 R finger and wrist flexion

## 2017-10-25 NOTE — PHYSICAL THERAPY INITIAL EVALUATION ADULT - ACTIVE RANGE OF MOTION EXAMINATION, REHAB EVAL
no AROM R UE except for / finger flexion (unable to open hand after squeezing), no active motion noted R LE or L LE, active elbow and finger flexion noted in L UE

## 2017-10-25 NOTE — PROGRESS NOTE ADULT - ATTENDING COMMENTS
I have seen patient and noted the left scapular skin tag with denuded skin, no surrounding erythema, no purulence or induration noted , axillary areas are also examined with no induration  or fluctuance.  On the left hand there is a small area of irritation.  We will continue to monitor patient .  No fever ( Tm= 99.2) with periods of tachycardia.  Of note, Blood culture done off of antibiotics on 10/24/17 with Gram Positive Cocci in Clusters in both aerobic and anerobic bottles / As per Micro, PCR will not be done , unsure if contaminant or not.  Will give one dose of Vancomycin tonight , Patient has HD tomorrow , will get random Vancomycin level tomorrow and f/up Identification of organisma and repeat Blood CX.  I have called and updated patient's daughter , Krystal Lara.        Nicki Hassanre   Hospitalist   [phone] I have seen patient and noted the left scapular skin tag with denuded skin, no surrounding erythema, no purulence or induration noted , axillary areas are also examined with no induration  or fluctuance.  On the left hand there is a small area of irritation.  We will continue to monitor patient .  No fever ( Tm= 99.2) with periods of tachycardia.  Of note, Blood culture done off of antibiotics on 10/24/17 with Gram Positive Cocci in Clusters in both aerobic and anerobic bottles / As per Micro, PCR will not be done , unsure if contaminant or not.  Will give one dose of Vancomycin tonight , Patient has HD tomorrow , will get random Vancomycin level tomorrow and f/up Identification of organisma and repeat Blood CX.  I have called and updated patient's daughter , Krystal Lara.   Coumadin tonight and INR in AM , was reported that patient was off of coumadin which could explain the subtherapeutic iNR.        Nicki Sampson   Hospitalist   [phone]

## 2017-10-25 NOTE — DIETITIAN INITIAL EVALUATION ADULT. - DIET TYPE
renal replacement pts:no protein restr,no conc K & phos, low sodium/dysphagia 1, pureed, honey consistency fluid/consistent carbohydrate (no snacks)

## 2017-10-25 NOTE — DIETITIAN INITIAL EVALUATION ADULT. - PERTINENT LABORATORY DATA
Na 142, K+ 3.7, BUN 46, Cr 4.77, , Phos 3.9,  POCT [id number] Na 142, K+ 3.7, BUN 46, Cr 4.77, , Phos 3.9,  POCT [id number],165,125, HgbA1c 6.5

## 2017-10-25 NOTE — PROGRESS NOTE ADULT - PROBLEM SELECTOR PLAN 3
ESRD on HD, consult called Dr. Elif Solis - HD M, W, F ESRD on HD, consult called Dr. Elif Solis - HD T, Thu, Saturday

## 2017-10-25 NOTE — PROGRESS NOTE ADULT - SUBJECTIVE AND OBJECTIVE BOX
Subjective: no dyspnea. Confused      MEDICATIONS  (STANDING):  amiodarone    Tablet 200 milliGRAM(s) Oral daily  artificial  tears Solution 1 Drop(s) Both EYES four times a day  aspirin enteric coated 81 milliGRAM(s) Oral daily  atorvastatin 40 milliGRAM(s) Oral at bedtime  busPIRone 5 milliGRAM(s) Oral two times a day  clopidogrel Tablet 75 milliGRAM(s) Oral daily  clotrimazole/betamethasone Cream 1 Application(s) Topical two times a day  dextrose 5%. 1000 milliLiter(s) (50 mL/Hr) IV Continuous <Continuous>  dextrose 50% Injectable 12.5 Gram(s) IV Push once  dextrose 50% Injectable 25 Gram(s) IV Push once  dextrose 50% Injectable 25 Gram(s) IV Push once  epoetin georgie Injectable 10517 Unit(s) IV Push <User Schedule>  fluticasone propionate   110 MICROgram(s) HFA Inhaler 1 Puff(s) Inhalation daily  insulin lispro (HumaLOG) corrective regimen sliding scale   SubCutaneous three times a day before meals  pantoprazole   Suspension 40 milliGRAM(s) Oral before breakfast  sevelamer carbonate Powder 1600 milliGRAM(s) Oral three times a day with meals  tamsulosin 0.4 milliGRAM(s) Oral at bedtime    MEDICATIONS  (PRN):  ALBUTerol    90 MICROgram(s) HFA Inhaler 2 Puff(s) Inhalation every 6 hours PRN Shortness of Breath and/or Wheezing  dextrose Gel 1 Dose(s) Oral once PRN Blood Glucose LESS THAN 70 milliGRAM(s)/deciliter  glucagon  Injectable 1 milliGRAM(s) IntraMuscular once PRN Glucose LESS THAN 70 milligrams/deciliter          T(C): 36.7 (10-25-17 @ 04:00), Max: 37.3 (10-25-17 @ 00:10)  HR: 114 (10-25-17 @ 10:50) (91 - 114)  BP: 125/75 (10-25-17 @ 10:50) (99/64 - 129/74)  RR: 18 (10-25-17 @ 04:00) (18 - 18)  SpO2: 97% (10-25-17 @ 10:50) (95% - 100%)  Wt(kg): --        I&O's Detail    24 Oct 2017 07:01  -  25 Oct 2017 07:00  --------------------------------------------------------  IN:    Oral Fluid: 360 mL  Total IN: 360 mL    OUT:    Other: 1000 mL  Total OUT: 1000 mL    Total NET: -640 mL      25 Oct 2017 07:01  -  25 Oct 2017 12:18  --------------------------------------------------------  IN:    Oral Fluid: 120 mL  Total IN: 120 mL    OUT:  Total OUT: 0 mL    Total NET: 120 mL               PHYSICAL EXAM:    GENERAL: no distress  EYES: EOMI, PERRLA, conjunctiva and sclera clear  NECK: Supple, no inc in JVP  CHEST/LUNG: Clear  HEART: S1S2  ABDOMEN: Soft, Nontender, Nondistended; Bowel sounds present  EXTREMITIES:  trace edema  NEURO: non-focal exam. Confused  ACCESS: R chest PC      LABS:  CBC Full  -  ( 25 Oct 2017 06:59 )  WBC Count : 7.4 K/uL  Hemoglobin : 9.9 g/dL  Hematocrit : 30.7 %  Platelet Count - Automated : 230 K/uL  Mean Cell Volume : 90.6 fl  Mean Cell Hemoglobin : 29.3 pg  Mean Cell Hemoglobin Concentration : 32.4 gm/dL  Auto Neutrophil # : 4.9 K/uL  Auto Lymphocyte # : 1.5 K/uL  Auto Monocyte # : 0.6 K/uL  Auto Eosinophil # : 0.3 K/uL  Auto Basophil # : 0.1 K/uL  Auto Neutrophil % : 66.4 %  Auto Lymphocyte % : 20.8 %  Auto Monocyte % : 8.5 %  Auto Eosinophil % : 3.6 %  Auto Basophil % : 0.7 %    10-25    142  |  98  |  46<H>  ----------------------------<  121<H>  3.7   |  25  |  4.77<H>    Ca    8.9      25 Oct 2017 06:58  Phos  3.9     10-25  Mg     2.2     10-25      PT/INR - ( 25 Oct 2017 06:59 )   PT: 14.2 sec;   INR: 1.30 ratio         PTT - ( 25 Oct 2017 06:59 )  PTT:31.1 sec    Culture Results:   Growth in anaerobic bottle: Gram Positive Cocci in Clusters (10-24 @ 17:42)        Assessment and Recommendation:     ESRD on HD, admitted for eval/management of bacteremia presumed to be due to contamination. Monitored off Abx  Next maint HD 10/26. Gentle UF if BP permits. 3K bath  No objection to dc from renal POV

## 2017-10-25 NOTE — PROGRESS NOTE ADULT - PROBLEM SELECTOR PLAN 1
Prelim blood cultures with bacillus/coag negative staph likely contaminants- patient with hypotension in HD.  - repeat BCx negative (In total, BCx negative x4)  - maculopapular resolved  - CT A/P negative, CXR negative  - antibiotics discontinued on 10/23 no evidence of an infection  will repeat blood culture and monitor off antibiotics Prelim blood cultures with bacillus/coag negative staph likely contaminants- patient with hypotension @ rehab HD.  - repeat BCx negative (In total, BCx negative x4)  - maculopapular resolved  - CT A/P negative, CXR negative  - antibiotics discontinued on 10/23 no evidence of an infection  - will repeat blood culture and monitor off antibiotics Prelim blood cultures with bacillus/coag negative staph likely contaminants- patient with hypotension @ rehab HD.  - repeat BCx s/p 24 hours off abx growing gram + cocci in clusters (likely contaminant)  - repeat BCx negative (In total, BCx negative x4)  - maculopapular resolved  - CT A/P negative, CXR negative  - antibiotics discontinued on 10/23 no evidence of an infection  - will repeat blood culture and monitor off antibiotics Prelim blood cultures with bacillus/coag negative staph likely contaminants- patient with hypotension @ rehab HD/ has been stable in the hospital.   - repeat BCx s/p 24 hours off abx growing gram + cocci in clusters (likely contaminant)  - repeat BCx negative (In total, BCx negative x4)  - maculopapular resolved  - CT A/P negative, CXR negative  - antibiotics discontinued on 10/23 no evidence of an infection

## 2017-10-25 NOTE — PROGRESS NOTE ADULT - PROBLEM SELECTOR PLAN 9
pt currently in sinus rhythm  monitor for episodes of AF on tele  INR subtherapeutic, will dose Coumadin/ F/up INR In AM pt currently in sinus rhythm  monitor for episodes of AF on tele  INR subtherapeutic again today, will dose Coumadin/ F/up INR tmrw In AM

## 2017-10-25 NOTE — PHYSICAL THERAPY INITIAL EVALUATION ADULT - PERTINENT HX OF CURRENT PROBLEM, REHAB EVAL
72 y.o. M presents from rehabs/p hypotensive episode while at HD. Pt was d/c from [facility] 10/18 after prolonged hospital stay initially presented with NSTEMI/pulm edema, UTI, VT s/p shock, embolic CVA, new AF. Pt now with + blood culture gram variable rods.

## 2017-10-25 NOTE — PROGRESS NOTE ADULT - SUBJECTIVE AND OBJECTIVE BOX
TEAM 4 Medicine Intern: Oscar Lorenzo  Spectralink: [phone]  Pager: [phone]    HPI/INTERVAL HISTORY:  Patient seen and examined at bedside.    OBJECTIVE:  VITAL SIGNS:  ICU Vital Signs Last 24 Hrs  T(C): 36.7 (25 Oct 2017 04:00), Max: 37.3 (25 Oct 2017 00:10)  T(F): 98.1 (25 Oct 2017 04:00), Max: 99.2 (25 Oct 2017 00:10)  HR: 93 (25 Oct 2017 04:00) (87 - 101)  BP: 122/77 (25 Oct 2017 04:00) (115/75 - 134/81)  BP(mean): --  ABP: --  ABP(mean): --  RR: 18 (25 Oct 2017 04:00) (18 - 18)  SpO2: 95% (25 Oct 2017 04:00) (95% - 100%)        10-24 @ 07:01  -  10-25 @ 07:00  --------------------------------------------------------  IN: 360 mL / OUT: 1000 mL / NET: -640 mL      CAPILLARY BLOOD GLUCOSE      POCT Blood Glucose.: 165 mg/dL (24 Oct 2017 21:28)      PHYSICAL EXAM:  GENERAL: NAD, well-developed, sleeping comfortably in bed  HEAD:  Atraumatic, Normocephalic  EYES: conjunctiva and sclera clear  NECK: Supple, No JVD  CHEST/LUNG: Clear to auscultation bilaterally; No wheeze  HEART: Regular rate and rhythm; No murmurs, rubs, or gallops  ABDOMEN: Soft, Nontender, Nondistended; Bowel sounds present  EXTREMITIES:  2+ Peripheral Pulses, No clubbing, cyanosis, or edema  PSYCH: awake and alert, not following commands  SKIN: small area of maculopapualr rash around R axilla, but seems resolved. sacral decubitus stage 3 ulcer present, permacath site in R anterior chest wall in place    LABS:                        9.9    7.4   )-----------( 230      ( 25 Oct 2017 06:59 )             30.7     10-25    142  |  98  |  46<H>  ----------------------------<  121<H>  3.7   |  25  |  4.77<H>    Ca    8.9      25 Oct 2017 06:58  Phos  3.9     10-25  Mg     2.2     10-25        PT/INR - ( 25 Oct 2017 06:59 )   PT: 14.2 sec;   INR: 1.30 ratio         PTT - ( 25 Oct 2017 06:59 )  PTT:31.1 sec          RADIOLOGY & ADDITIONAL TESTS: Reviewed.    ALBUTerol    90 MICROgram(s) HFA Inhaler 2 Puff(s) Inhalation every 6 hours PRN  amiodarone    Tablet 200 milliGRAM(s) Oral daily  artificial  tears Solution 1 Drop(s) Both EYES four times a day  aspirin enteric coated 81 milliGRAM(s) Oral daily  atorvastatin 40 milliGRAM(s) Oral at bedtime  busPIRone 5 milliGRAM(s) Oral two times a day  clopidogrel Tablet 75 milliGRAM(s) Oral daily  clotrimazole/betamethasone Cream 1 Application(s) Topical two times a day  dextrose 5%. 1000 milliLiter(s) IV Continuous <Continuous>  dextrose 50% Injectable 12.5 Gram(s) IV Push once  dextrose 50% Injectable 25 Gram(s) IV Push once  dextrose 50% Injectable 25 Gram(s) IV Push once  dextrose Gel 1 Dose(s) Oral once PRN  epoetin georgie Injectable 84673 Unit(s) IV Push <User Schedule>  fluticasone propionate   110 MICROgram(s) HFA Inhaler 1 Puff(s) Inhalation daily  glucagon  Injectable 1 milliGRAM(s) IntraMuscular once PRN  insulin lispro (HumaLOG) corrective regimen sliding scale   SubCutaneous three times a day before meals  pantoprazole   Suspension 40 milliGRAM(s) Oral before breakfast  sevelamer carbonate Powder 1600 milliGRAM(s) Oral three times a day with meals  tamsulosin 0.4 milliGRAM(s) Oral at bedtime      No Known Allergies TEAM 4 Medicine Intern: Oscar Lorenzo  Spectralink: [phone]  Pager: [phone]    HPI/INTERVAL HISTORY: Overnight, no acute events. Patient seen and examined at bedside this AM. This morning after PT evaluation, patient was sat up in a chair and was tachycardic to 120s. Telemetry showed sinus tachycardia, pt asymptomatic. Patient is nonverbal at baseline.     OBJECTIVE:  VITAL SIGNS:  ICU Vital Signs Last 24 Hrs  T(C): 36.7 (25 Oct 2017 04:00), Max: 37.3 (25 Oct 2017 00:10)  T(F): 98.1 (25 Oct 2017 04:00), Max: 99.2 (25 Oct 2017 00:10)  HR: 93 (25 Oct 2017 04:00) (87 - 101)  BP: 122/77 (25 Oct 2017 04:00) (115/75 - 134/81)  BP(mean): --  ABP: --  ABP(mean): --  RR: 18 (25 Oct 2017 04:00) (18 - 18)  SpO2: 95% (25 Oct 2017 04:00) (95% - 100%)        10-24 @ 07:01  -  10-25 @ 07:00  --------------------------------------------------------  IN: 360 mL / OUT: 1000 mL / NET: -640 mL      CAPILLARY BLOOD GLUCOSE      POCT Blood Glucose.: 165 mg/dL (24 Oct 2017 21:28)      PHYSICAL EXAM:  GENERAL: NAD, well-developed, sleeping comfortably in bed  HEAD:  Atraumatic, Normocephalic  EYES: conjunctiva and sclera clear  NECK: Supple, No JVD  CHEST/LUNG: Clear to auscultation bilaterally; No wheeze  HEART: Regular rate and rhythm; holosytolic murmur, rubs, or gallops  ABDOMEN: Soft, Nontender, Nondistended; Bowel sounds present  EXTREMITIES:  2+ Peripheral Pulses, No clubbing, cyanosis, or edema  PSYCH: awake and alert, not following commands  SKIN: small area of maculopapualr rash around R axilla, but seems resolved. sacral decubitus stage 3 ulcer present, permacath site in R anterior chest wall in place    LABS:                        9.9    7.4   )-----------( 230      ( 25 Oct 2017 06:59 )             30.7     10-25    142  |  98  |  46<H>  ----------------------------<  121<H>  3.7   |  25  |  4.77<H>    Ca    8.9      25 Oct 2017 06:58  Phos  3.9     10-25  Mg     2.2     10-25        PT/INR - ( 25 Oct 2017 06:59 )   PT: 14.2 sec;   INR: 1.30 ratio         PTT - ( 25 Oct 2017 06:59 )  PTT:31.1 sec          RADIOLOGY & ADDITIONAL TESTS: Reviewed.    ALBUTerol    90 MICROgram(s) HFA Inhaler 2 Puff(s) Inhalation every 6 hours PRN  amiodarone    Tablet 200 milliGRAM(s) Oral daily  artificial  tears Solution 1 Drop(s) Both EYES four times a day  aspirin enteric coated 81 milliGRAM(s) Oral daily  atorvastatin 40 milliGRAM(s) Oral at bedtime  busPIRone 5 milliGRAM(s) Oral two times a day  clopidogrel Tablet 75 milliGRAM(s) Oral daily  clotrimazole/betamethasone Cream 1 Application(s) Topical two times a day  dextrose 5%. 1000 milliLiter(s) IV Continuous <Continuous>  dextrose 50% Injectable 12.5 Gram(s) IV Push once  dextrose 50% Injectable 25 Gram(s) IV Push once  dextrose 50% Injectable 25 Gram(s) IV Push once  dextrose Gel 1 Dose(s) Oral once PRN  epoetin georgie Injectable 97742 Unit(s) IV Push <User Schedule>  fluticasone propionate   110 MICROgram(s) HFA Inhaler 1 Puff(s) Inhalation daily  glucagon  Injectable 1 milliGRAM(s) IntraMuscular once PRN  insulin lispro (HumaLOG) corrective regimen sliding scale   SubCutaneous three times a day before meals  pantoprazole   Suspension 40 milliGRAM(s) Oral before breakfast  sevelamer carbonate Powder 1600 milliGRAM(s) Oral three times a day with meals  tamsulosin 0.4 milliGRAM(s) Oral at bedtime      No Known Allergies

## 2017-10-25 NOTE — PROGRESS NOTE ADULT - ASSESSMENT
72 year old male with PMH of DM, HTN, HLD, Embolic CVA with right hemiplegia, COPD, bipolar, bilateral nephrolithiasis and bladder stones, ASHD, discharged 10/18 after long hospital stay when initially presented with NSTEMI/pulm edema, UTI, VT s/p shock, embolic CVA, new AF, re-admitted after hypotensive episode in dialysis found with bacteremia x2 likely contaminants 72 year old male with PMH of DM, HTN, HLD, Embolic CVA with right hemiplegia, COPD, bipolar, bilateral nephrolithiasis and bladder stones, ASHD, discharged 10/18 after long hospital stay when initially presented with NSTEMI/pulm edema, UTI, VT s/p shock, embolic CVA, new AF, re-admitted after hypotensive episode in dialysis found with bacteremia x2/ with 2 different organisms likely contaminants.

## 2017-10-25 NOTE — PHYSICAL THERAPY INITIAL EVALUATION ADULT - ADDITIONAL COMMENTS
History taken from PT eval initial hospital admission 9/26/17: Pt lives alone in apartment with elevator, daughter lives in the same building. Pt ambulates independently with no assistive device, has a rolling walker at home. Pt has [facility] 8 hrs/day (for cleaning, food prep, medications, etc.), grandson stays with him in the evenings, and pt is alone at night.

## 2017-10-26 LAB
ANION GAP SERPL CALC-SCNC: 20 MMOL/L — HIGH (ref 5–17)
APTT BLD: 31.7 SEC — SIGNIFICANT CHANGE UP (ref 27.5–37.4)
BUN SERPL-MCNC: 75 MG/DL — HIGH (ref 7–23)
CALCIUM SERPL-MCNC: 9.2 MG/DL — SIGNIFICANT CHANGE UP (ref 8.4–10.5)
CHLORIDE SERPL-SCNC: 97 MMOL/L — SIGNIFICANT CHANGE UP (ref 96–108)
CO2 SERPL-SCNC: 22 MMOL/L — SIGNIFICANT CHANGE UP (ref 22–31)
CREAT SERPL-MCNC: 7.26 MG/DL — HIGH (ref 0.5–1.3)
CULTURE RESULTS: SIGNIFICANT CHANGE UP
GLUCOSE BLDC GLUCOMTR-MCNC: 112 MG/DL — HIGH (ref 70–99)
GLUCOSE BLDC GLUCOMTR-MCNC: 129 MG/DL — HIGH (ref 70–99)
GLUCOSE BLDC GLUCOMTR-MCNC: 147 MG/DL — HIGH (ref 70–99)
GLUCOSE BLDC GLUCOMTR-MCNC: 154 MG/DL — HIGH (ref 70–99)
GLUCOSE SERPL-MCNC: 196 MG/DL — HIGH (ref 70–99)
HCT VFR BLD CALC: 29.3 % — LOW (ref 39–50)
HGB BLD-MCNC: 9.8 G/DL — LOW (ref 13–17)
INR BLD: 1.35 RATIO — HIGH (ref 0.88–1.16)
MAGNESIUM SERPL-MCNC: 2.2 MG/DL — SIGNIFICANT CHANGE UP (ref 1.6–2.6)
MCHC RBC-ENTMCNC: 30.2 PG — SIGNIFICANT CHANGE UP (ref 27–34)
MCHC RBC-ENTMCNC: 33.5 GM/DL — SIGNIFICANT CHANGE UP (ref 32–36)
MCV RBC AUTO: 90 FL — SIGNIFICANT CHANGE UP (ref 80–100)
PHOSPHATE SERPL-MCNC: 4.9 MG/DL — HIGH (ref 2.5–4.5)
PLATELET # BLD AUTO: 226 K/UL — SIGNIFICANT CHANGE UP (ref 150–400)
POTASSIUM SERPL-MCNC: 3.6 MMOL/L — SIGNIFICANT CHANGE UP (ref 3.5–5.3)
POTASSIUM SERPL-SCNC: 3.6 MMOL/L — SIGNIFICANT CHANGE UP (ref 3.5–5.3)
PROTHROM AB SERPL-ACNC: 14.7 SEC — HIGH (ref 9.8–12.7)
RBC # BLD: 3.26 M/UL — LOW (ref 4.2–5.8)
RBC # FLD: 13.3 % — SIGNIFICANT CHANGE UP (ref 10.3–14.5)
SODIUM SERPL-SCNC: 139 MMOL/L — SIGNIFICANT CHANGE UP (ref 135–145)
SPECIMEN SOURCE: SIGNIFICANT CHANGE UP
VANCOMYCIN FLD-MCNC: 21.8 UG/ML — SIGNIFICANT CHANGE UP
WBC # BLD: 7.1 K/UL — SIGNIFICANT CHANGE UP (ref 3.8–10.5)
WBC # FLD AUTO: 7.1 K/UL — SIGNIFICANT CHANGE UP (ref 3.8–10.5)

## 2017-10-26 PROCEDURE — 99233 SBSQ HOSP IP/OBS HIGH 50: CPT | Mod: GC

## 2017-10-26 PROCEDURE — 99233 SBSQ HOSP IP/OBS HIGH 50: CPT

## 2017-10-26 RX ORDER — VANCOMYCIN HCL 1 G
1000 VIAL (EA) INTRAVENOUS ONCE
Qty: 0 | Refills: 0 | Status: COMPLETED | OUTPATIENT
Start: 2017-10-26 | End: 2017-10-26

## 2017-10-26 RX ORDER — WARFARIN SODIUM 2.5 MG/1
3.5 TABLET ORAL ONCE
Qty: 0 | Refills: 0 | Status: COMPLETED | OUTPATIENT
Start: 2017-10-26 | End: 2017-10-26

## 2017-10-26 RX ADMIN — TAMSULOSIN HYDROCHLORIDE 0.4 MILLIGRAM(S): 0.4 CAPSULE ORAL at 21:55

## 2017-10-26 RX ADMIN — Medication 1 DROP(S): at 21:55

## 2017-10-26 RX ADMIN — Medication 1 PUFF(S): at 13:05

## 2017-10-26 RX ADMIN — PANTOPRAZOLE SODIUM 40 MILLIGRAM(S): 20 TABLET, DELAYED RELEASE ORAL at 05:19

## 2017-10-26 RX ADMIN — Medication 81 MILLIGRAM(S): at 13:05

## 2017-10-26 RX ADMIN — Medication 250 MILLIGRAM(S): at 14:39

## 2017-10-26 RX ADMIN — Medication 1 DROP(S): at 05:19

## 2017-10-26 RX ADMIN — Medication: at 10:18

## 2017-10-26 RX ADMIN — WARFARIN SODIUM 3.5 MILLIGRAM(S): 2.5 TABLET ORAL at 17:51

## 2017-10-26 RX ADMIN — CLOTRIMAZOLE AND BETAMETHASONE DIPROPIONATE 1 APPLICATION(S): 10; .5 CREAM TOPICAL at 17:28

## 2017-10-26 RX ADMIN — Medication 1 TABLET(S): at 13:04

## 2017-10-26 RX ADMIN — AMIODARONE HYDROCHLORIDE 200 MILLIGRAM(S): 400 TABLET ORAL at 05:19

## 2017-10-26 RX ADMIN — Medication 5 MILLIGRAM(S): at 17:28

## 2017-10-26 RX ADMIN — SEVELAMER CARBONATE 1600 MILLIGRAM(S): 2400 POWDER, FOR SUSPENSION ORAL at 17:28

## 2017-10-26 RX ADMIN — ERYTHROPOIETIN 10000 UNIT(S): 10000 INJECTION, SOLUTION INTRAVENOUS; SUBCUTANEOUS at 10:58

## 2017-10-26 RX ADMIN — SEVELAMER CARBONATE 1600 MILLIGRAM(S): 2400 POWDER, FOR SUSPENSION ORAL at 10:18

## 2017-10-26 RX ADMIN — Medication 1 DROP(S): at 17:28

## 2017-10-26 RX ADMIN — CLOTRIMAZOLE AND BETAMETHASONE DIPROPIONATE 1 APPLICATION(S): 10; .5 CREAM TOPICAL at 05:19

## 2017-10-26 RX ADMIN — ATORVASTATIN CALCIUM 40 MILLIGRAM(S): 80 TABLET, FILM COATED ORAL at 21:55

## 2017-10-26 RX ADMIN — Medication 1 DROP(S): at 00:59

## 2017-10-26 RX ADMIN — CLOPIDOGREL BISULFATE 75 MILLIGRAM(S): 75 TABLET, FILM COATED ORAL at 13:04

## 2017-10-26 RX ADMIN — Medication 1 DROP(S): at 13:04

## 2017-10-26 RX ADMIN — SEVELAMER CARBONATE 1600 MILLIGRAM(S): 2400 POWDER, FOR SUSPENSION ORAL at 13:05

## 2017-10-26 RX ADMIN — Medication 5 MILLIGRAM(S): at 05:19

## 2017-10-26 NOTE — PROGRESS NOTE ADULT - ASSESSMENT
72 year old male with PMH of DM, HTN, HLD, Embolic CVA with right hemiplegia, COPD, bipolar, bilateral nephrolithiasis and bladder stones, ASHD, discharged 10/18 after long hospital stay when initially presented with NSTEMI/pulm edema, UTI, VT s/p shock, embolic CVA, new AF, re-admitted after hypotensive episode in dialysis found with bacteremia x2/ with 2 different organisms likely contaminants.

## 2017-10-26 NOTE — PROGRESS NOTE ADULT - SUBJECTIVE AND OBJECTIVE BOX
TEAM 4     HPI/INTERVAL HISTORY: Overnight, no acute events. Patient seen and examined at bedside this AM. This morning after PT evaluation, patient was sat up in a chair and was tachycardic to 120s. Telemetry showed sinus tachycardia, pt asymptomatic. Patient is nonverbal at baseline.     MEDICATIONS  (STANDING):  amiodarone    Tablet 200 milliGRAM(s) Oral daily  artificial  tears Solution 1 Drop(s) Both EYES four times a day  aspirin enteric coated 81 milliGRAM(s) Oral daily  atorvastatin 40 milliGRAM(s) Oral at bedtime  busPIRone 5 milliGRAM(s) Oral two times a day  clopidogrel Tablet 75 milliGRAM(s) Oral daily  clotrimazole/betamethasone Cream 1 Application(s) Topical two times a day  dextrose 5%. 1000 milliLiter(s) (50 mL/Hr) IV Continuous <Continuous>  dextrose 50% Injectable 12.5 Gram(s) IV Push once  dextrose 50% Injectable 25 Gram(s) IV Push once  dextrose 50% Injectable 25 Gram(s) IV Push once  epoetin georgie Injectable 37943 Unit(s) IV Push <User Schedule>  fluticasone propionate   110 MICROgram(s) HFA Inhaler 1 Puff(s) Inhalation daily  insulin lispro (HumaLOG) corrective regimen sliding scale   SubCutaneous Before meals and at bedtime  Nephro-sophie 1 Tablet(s) Oral daily  pantoprazole    Tablet 40 milliGRAM(s) Oral before breakfast  sevelamer carbonate Powder 1600 milliGRAM(s) Oral three times a day with meals  tamsulosin 0.4 milliGRAM(s) Oral at bedtime  ALBUTerol    90 MICROgram(s) HFA Inhaler 2 Puff(s) Inhalation every 6 hours PRN Shortness of Breath and/or Wheezing  dextrose Gel 1 Dose(s) Oral once PRN Blood Glucose LESS THAN 70 milliGRAM(s)/deciliter  glucagon  Injectable 1 milliGRAM(s) IntraMuscular once PRN Glucose LESS THAN 70 milligrams/deciliter      OBJECTIVE:  ICU Vital Signs Last 24 Hrs  T(C): 36.8 (26 Oct 2017 04:00), Max: 37.1 (25 Oct 2017 13:12)  T(F): 98.2 (26 Oct 2017 04:00), Max: 98.8 (25 Oct 2017 13:12)  HR: 83 (26 Oct 2017 04:00) (82 - 114)  BP: 143/77 (26 Oct 2017 04:00) (99/64 - 143/77)  BP(mean): --  ABP: --  ABP(mean): --  RR: 17 (26 Oct 2017 04:00) (17 - 18)  SpO2: 95% (26 Oct 2017 04:00) (95% - 100%)      PHYSICAL EXAM:  GENERAL: NAD, well-developed, sleeping comfortably in bed  HEAD:  Atraumatic, Normocephalic  EYES: conjunctiva and sclera clear  NECK: Supple, No JVD  CHEST/LUNG: Clear to auscultation bilaterally; No wheeze  HEART: Regular rate and rhythm; holosytolic murmur, rubs, or gallops  ABDOMEN: Soft, Nontender, Nondistended; Bowel sounds present  EXTREMITIES:  2+ Peripheral Pulses, No clubbing, cyanosis, or edema  PSYCH: awake and alert, not following commands  SKIN: small area of maculopapualr rash around R axilla, but seems resolved. sacral decubitus stage 3 ulcer present, permacath site in R anterior chest wall in place    LABS:                        9.8    7.1   )-----------( 226      ( 26 Oct 2017 06:40 )             29.3     Hgb Trend: 9.8<--, 9.9<--, 9.7<--, 10.1<--, 10.0<--  10-26    139  |  97  |  x   ----------------------------<  196<H>  3.6   |  22  |  7.26<H>    Ca    9.2      26 Oct 2017 06:40  Phos  4.9     10-26  Mg     2.2     10-25      Creatinine Trend: 7.26<--, 4.77<--, 7.06<--, 5.62<--, 3.86<--, 7.78<--  PT/INR - ( 26 Oct 2017 06:40 )   PT: 14.7 sec;   INR: 1.35 ratio         PTT - ( 26 Oct 2017 06:40 )  PTT:31.7 sec        Culture - Blood (10.24.17 @ 17:43)    Specimen Source: .Blood Blood-Peripheral    Culture Results:   No growth to date.    Culture - Blood (10.24.17 @ 17:42)    Gram Stain:   Growth in anaerobic bottle: Gram Positive Cocci in Clusters  Growth in aerobic bottle: Gram Positive Cocci in Clusters    Specimen Source: .Blood Blood    Culture Results:   Growth in anaerobic bottle: Gram Positive Cocci in Clusters  Growth in aerobic bottle: Gram Positive Cocci in Clusters        RADIOLOGY & ADDITIONAL TESTS: Reviewed. TEAM 4     HPI/INTERVAL HISTORY: Overnight, no acute events. Patient is nonverbal at baseline. Per nurse, no acute changes. Nurse denies any further bleeding of skin tag on shoulder.      MEDICATIONS  (STANDING):  amiodarone    Tablet 200 milliGRAM(s) Oral daily  artificial  tears Solution 1 Drop(s) Both EYES four times a day  aspirin enteric coated 81 milliGRAM(s) Oral daily  atorvastatin 40 milliGRAM(s) Oral at bedtime  busPIRone 5 milliGRAM(s) Oral two times a day  clopidogrel Tablet 75 milliGRAM(s) Oral daily  clotrimazole/betamethasone Cream 1 Application(s) Topical two times a day  dextrose 5%. 1000 milliLiter(s) (50 mL/Hr) IV Continuous <Continuous>  dextrose 50% Injectable 12.5 Gram(s) IV Push once  dextrose 50% Injectable 25 Gram(s) IV Push once  dextrose 50% Injectable 25 Gram(s) IV Push once  epoetin georgie Injectable 22502 Unit(s) IV Push <User Schedule>  fluticasone propionate   110 MICROgram(s) HFA Inhaler 1 Puff(s) Inhalation daily  insulin lispro (HumaLOG) corrective regimen sliding scale   SubCutaneous Before meals and at bedtime  Nephro-sophie 1 Tablet(s) Oral daily  pantoprazole    Tablet 40 milliGRAM(s) Oral before breakfast  sevelamer carbonate Powder 1600 milliGRAM(s) Oral three times a day with meals  tamsulosin 0.4 milliGRAM(s) Oral at bedtime  ALBUTerol    90 MICROgram(s) HFA Inhaler 2 Puff(s) Inhalation every 6 hours PRN Shortness of Breath and/or Wheezing  dextrose Gel 1 Dose(s) Oral once PRN Blood Glucose LESS THAN 70 milliGRAM(s)/deciliter  glucagon  Injectable 1 milliGRAM(s) IntraMuscular once PRN Glucose LESS THAN 70 milligrams/deciliter      OBJECTIVE:  ICU Vital Signs Last 24 Hrs  T(C): 36.8 (26 Oct 2017 04:00), Max: 37.1 (25 Oct 2017 13:12)  T(F): 98.2 (26 Oct 2017 04:00), Max: 98.8 (25 Oct 2017 13:12)  HR: 83 (26 Oct 2017 04:00) (82 - 114)  BP: 143/77 (26 Oct 2017 04:00) (99/64 - 143/77)  BP(mean): --  ABP: --  ABP(mean): --  RR: 17 (26 Oct 2017 04:00) (17 - 18)  SpO2: 95% (26 Oct 2017 04:00) (95% - 100%)      PHYSICAL EXAM:  GENERAL: NAD, well-developed, sleeping comfortably in bed  HEAD:  Atraumatic, Normocephalic  EYES: conjunctiva and sclera clear  NECK: Supple, No JVD  CHEST/LUNG: Clear to auscultation bilaterally; No wheeze  HEART: Regular rate and rhythm; holosytolic murmur, rubs, or gallops  ABDOMEN: Soft, Nontender, Nondistended; Bowel sounds present  EXTREMITIES:  2+ Peripheral Pulses, No clubbing, cyanosis, or edema  PSYCH: awake and alert, not following commands  SKIN: sacral decubitus stage 3 ulcer present, permacath site in R anterior chest wall in place, small blister on lateral edge of R thumb    LABS:                        9.8    7.1   )-----------( 226      ( 26 Oct 2017 06:40 )	             29.3     Hgb Trend: 9.8<--, 9.9<--, 9.7<--, 10.1<--, 10.0<--  10-26    139  |  97  |  x   ----------------------------<  196<H>  3.6   |  22  |  7.26<H>    Ca    9.2      26 Oct 2017 06:40  Phos  4.9     10-26  Mg     2.2     10-25      Creatinine Trend: 7.26<--, 4.77<--, 7.06<--, 5.62<--, 3.86<--, 7.78<--  PT/INR - ( 26 Oct 2017 06:40 )   PT: 14.7 sec;   INR: 1.35 ratio         PTT - ( 26 Oct 2017 06:40 )  PTT:31.7 sec        Culture - Blood (10.24.17 @ 17:43)    Specimen Source: .Blood Blood-Peripheral    Culture Results:   No growth to date.    Culture - Blood (10.24.17 @ 17:42)    Gram Stain:   Growth in anaerobic bottle: Gram Positive Cocci in Clusters  Growth in aerobic bottle: Gram Positive Cocci in Clusters    Specimen Source: .Blood Blood    Culture Results:   Growth in anaerobic bottle: Gram Positive Cocci in Clusters  Growth in aerobic bottle: Gram Positive Cocci in Clusters        RADIOLOGY & ADDITIONAL TESTS: Reviewed.

## 2017-10-26 NOTE — PROGRESS NOTE ADULT - PROBLEM SELECTOR PLAN 9
pt currently in sinus rhythm  monitor for episodes of AF on tele  INR subtherapeutic again today, will dose Coumadin/ F/up INR tmrw In AM

## 2017-10-26 NOTE — PROGRESS NOTE ADULT - SUBJECTIVE AND OBJECTIVE BOX
Resting, non-verbal    Vital Signs Last 24 Hrs  T(C): 36.3 (10-26-17 @ 13:33), Max: 36.8 (10-25-17 @ 20:58)  T(F): 97.3 (10-26-17 @ 13:33), Max: 98.3 (10-25-17 @ 20:58)  HR: 92 (10-26-17 @ 13:33) (82 - 94)  BP: 100/63 (10-26-17 @ 13:33) (100/63 - 143/77)  BP(mean): --  RR: 18 (10-26-17 @ 13:33) (17 - 18)  SpO2: 98% (10-26-17 @ 13:33) (95% - 100%)    Respiratory: b/l air entry, clear  Cardiovascular: S1 S2 regular  Gastrointestinal: soft, NT, BS present  Extremities: no edema     ALBUTerol    90 MICROgram(s) HFA Inhaler 2 Puff(s) Inhalation every 6 hours PRN  amiodarone    Tablet 200 milliGRAM(s) Oral daily  artificial  tears Solution 1 Drop(s) Both EYES four times a day  aspirin enteric coated 81 milliGRAM(s) Oral daily  atorvastatin 40 milliGRAM(s) Oral at bedtime  busPIRone 5 milliGRAM(s) Oral two times a day  clopidogrel Tablet 75 milliGRAM(s) Oral daily  clotrimazole/betamethasone Cream 1 Application(s) Topical two times a day  dextrose 5%. 1000 milliLiter(s) IV Continuous <Continuous>  dextrose 50% Injectable 12.5 Gram(s) IV Push once  dextrose 50% Injectable 25 Gram(s) IV Push once  dextrose 50% Injectable 25 Gram(s) IV Push once  dextrose Gel 1 Dose(s) Oral once PRN  epoetin georgie Injectable 78356 Unit(s) IV Push <User Schedule>  fluticasone propionate   110 MICROgram(s) HFA Inhaler 1 Puff(s) Inhalation daily  glucagon  Injectable 1 milliGRAM(s) IntraMuscular once PRN  insulin lispro (HumaLOG) corrective regimen sliding scale   SubCutaneous Before meals and at bedtime  Nephro-sophie 1 Tablet(s) Oral daily  pantoprazole    Tablet 40 milliGRAM(s) Oral before breakfast  sevelamer carbonate Powder 1600 milliGRAM(s) Oral three times a day with meals  tamsulosin 0.4 milliGRAM(s) Oral at bedtime                          9.8    7.1   )-----------( 226      ( 26 Oct 2017 06:40 )             29.3     26 Oct 2017 06:40    139    |  97     |  75     ----------------------------<  196    3.6     |  22     |  7.26     Ca    9.2        26 Oct 2017 06:40  Phos  4.9       26 Oct 2017 06:40  Mg     2.2       26 Oct 2017 06:40      Assessment and Recommendation:     ESRD on HD  HD TTS, TMP as able  Procrit w/HD  HD cath site clean, no erythema, no d/c  Adm w/bacteremia, ? contam  Off Abx per ID  D/c planning to [facility] when ready

## 2017-10-26 NOTE — PROGRESS NOTE ADULT - SUBJECTIVE AND OBJECTIVE BOX
Patient is a 72y old  Male who presents with a chief complaint of s/p cardiac cath (24 Oct 2017 15:51)    Being followed by ID for    Interval history:  No other acute events      ROS:  No cough,SOB,CP  No N/V/D  No abd pain  No urinary complaints  No HA  No joint or limb pain  No other complaints      Antimicrobials:        Vital Signs Last 24 Hrs  T(C): 36.7 (10-26-17 @ 09:00), Max: 37.1 (10-25-17 @ 13:12)  T(F): 98.1 (10-26-17 @ 09:00), Max: 98.8 (10-25-17 @ 13:12)  HR: 84 (10-26-17 @ 09:00) (82 - 97)  BP: 137/70 (10-26-17 @ 09:00) (126/81 - 143/77)  BP(mean): --  RR: 18 (10-26-17 @ 09:00) (17 - 18)  SpO2: 100% (10-26-17 @ 09:00) (95% - 100%)    Physical Exam:    Constitutional well preserved,comfortable,pleasant    HEENT PERRLA EOMI,No pallor or icterus    No oral exudate or erythema    Neck supple no JVD or LN    Chest Good AE,CTA    CVS RRR S1 S2 WNl No murmur or rub or gallop    Abd soft BS normal No tenderness no masses    Ext No cyanosis clubbing or edema    IV site no erythema tenderness or discharge    Joints no swelling or LOM    CNS AAO X 3 no focal    Lab Data:                          9.8    7.1   )-----------( 226      ( 26 Oct 2017 06:40 )             29.3       10-26    139  |  97  |  75<H>  ----------------------------<  196<H>  3.6   |  22  |  7.26<H>    Ca    9.2      26 Oct 2017 06:40  Phos  4.9     10-26  Mg     2.2     10-26          Culture - Blood (collected 24 Oct 2017 17:43)  Source: .Blood Blood-Peripheral  Preliminary Report (25 Oct 2017 18:01):    No growth to date.    Culture - Blood (collected 24 Oct 2017 17:42)  Source: .Blood Blood  Gram Stain (25 Oct 2017 14:04):    Growth in anaerobic bottle: Gram Positive Cocci in Clusters    Growth in aerobic bottle: Gram Positive Cocci in Clusters  Final Report (26 Oct 2017 10:44):    Growth in aerobic and anaerobic bottles: Coag Negative Staphylococcus    Single set isolate, possible contaminant. Contact    Microbiology if susceptibility testing clinically    indicated.    Culture - Blood (collected 22 Oct 2017 09:45)  Source: .Blood Blood  Preliminary Report (23 Oct 2017 10:01):    No growth to date.    Culture - Blood (collected 22 Oct 2017 09:45)  Source: .Blood Blood  Preliminary Report (23 Oct 2017 10:01):    No growth to date.    Culture - Blood (collected 22 Oct 2017 05:02)  Source: .Blood Blood-Peripheral  Preliminary Report (23 Oct 2017 06:00):    No growth to date.    Culture - Blood (collected 22 Oct 2017 05:02)  Source: .Blood Blood-Peripheral  Preliminary Report (23 Oct 2017 06:00):    No growth to date.              Vancomycin Level, Random: 21.8 ug/mL (10-26-17 @ 06:40) Patient is a 72y old  Male who presents with a chief complaint of s/p cardiac cath (24 Oct 2017 15:51)    Being followed by ID for bacteremia  Minimal response to stimuli  No other acute events  ROS:  Not obtainable    Antimicrobials:  vanco by levels        Vital Signs Last 24 Hrs  T(C): 36.7 (10-26-17 @ 09:00), Max: 37.1 (10-25-17 @ 13:12)  T(F): 98.1 (10-26-17 @ 09:00), Max: 98.8 (10-25-17 @ 13:12)  HR: 84 (10-26-17 @ 09:00) (82 - 97)  BP: 137/70 (10-26-17 @ 09:00) (126/81 - 143/77)  BP(mean): --  RR: 18 (10-26-17 @ 09:00) (17 - 18)  SpO2: 100% (10-26-17 @ 09:00) (95% - 100%)    Physical Exam:    Constitutional comfortable    HEENT PERRLA EOMI,No pallor or icterus    No oral exudate or erythema    Neck supple no JVD or LN    R SC HD catheter no erythema or discharge    Chest Good AE,CTA    CVS RRR S1 S2 WNl No murmur or rub or gallop    Abd soft BS normal No tenderness no masses    Ext No cyanosis clubbing or edema    IV site no erythema tenderness or discharge    Joints no swelling or LOM    CNS As above     Lab Data:                          9.8    7.1   )-----------( 226      ( 26 Oct 2017 06:40 )             29.3       10-26    139  |  97  |  75<H>  ----------------------------<  196<H>  3.6   |  22  |  7.26<H>    Ca    9.2      26 Oct 2017 06:40  Phos  4.9     10-26  Mg     2.2     10-26          Culture - Blood (collected 24 Oct 2017 17:43)  Source: .Blood Blood-Peripheral  Preliminary Report (25 Oct 2017 18:01):    No growth to date.    Culture - Blood (collected 24 Oct 2017 17:42)  Source: .Blood Blood  Gram Stain (25 Oct 2017 14:04):    Growth in anaerobic bottle: Gram Positive Cocci in Clusters    Growth in aerobic bottle: Gram Positive Cocci in Clusters  Final Report (26 Oct 2017 10:44):    Growth in aerobic and anaerobic bottles: Coag Negative Staphylococcus    Single set isolate, possible contaminant. Contact    Microbiology if susceptibility testing clinically    indicated.    Culture - Blood (collected 22 Oct 2017 09:45)  Source: .Blood Blood  Preliminary Report (23 Oct 2017 10:01):    No growth to date.    Culture - Blood (collected 22 Oct 2017 09:45)  Source: .Blood Blood  Preliminary Report (23 Oct 2017 10:01):    No growth to date.    Culture - Blood (collected 22 Oct 2017 05:02)  Source: .Blood Blood-Peripheral  Preliminary Report (23 Oct 2017 06:00):    No growth to date.    Culture - Blood (collected 22 Oct 2017 05:02)  Source: .Blood Blood-Peripheral  Preliminary Report (23 Oct 2017 06:00):    No growth to date.    Culture - Blood (10.21.17 @ 00:35)    Gram Stain:   Growth in aerobic bottle: Gram Positive Cocci in Clusters    -  Coagulase negative Staphylococcus: Detec    Specimen Source: .Blood Blood-Peripheral    Organism: Blood Culture PCR    Culture Results:   Growth in aerobic bottle: Coag Negative Staphylococcus  Single set isolate, possible contaminant. Contact  Microbiology if susceptibility testing clinically  indicated.  ***Blood Panel PCR results on this specimen are available  approximately 3 hours after the Gram stain result.***  Gram stain, PCR, and/or culture results may not always  correspond due to difference in methodologies.  ************************************************************  This PCR assay was performed using [employer].  The following targets are tested for: Enterococcus,  vancomycin resistant enterococci, Listeria monocytogenes,  coagulase negative staphylococci, S. aureus,  methicillin resistant S. aureus, Streptococcus agalactiae  (Group B), S. pneumoniae, S.pyogenes (Group A),  Acinetobacter baumannii, Enterobacter cloacae, E. coli,  Klebsiella oxytoca, K. pneumoniae, Proteus sp.,  Serratia marcescens, Haemophilus influenzae,  Neisseria meningitidis, Pseudomonas aeruginosa, Candida  albicans, C. glabrata, C krusei, C parapsilosis,  C. tropicalis and the KPC resistance gene.    Organism Identification: Blood Culture PCR    Method Type: PCR                  Vancomycin Level, Random: 21.8 ug/mL (10-26-17 @ 06:40)      Culture - Blood (10.21.17 @ 00:35)    Gram Stain:   Growth in aerobic bottle: Gram Variable Rods    -  Enterococcus species: Nondet    -  Vancomycin resistant Enterococcus sp.: Nondet    -  Escherichia coli: Nondet    -  Klebsiella oxytoca: Nondet    -  Klebsiella pneumoniae: Nondet    -  Serratia marcescens: Nondet    -  Proteus species: Nondet    -  Haemophilus influenzae: Nondet    -  Listeria monocytogenes: Nondet    -  Neisseria meningitidis: Nondet    -  Pseudomonas aeruginosa: Nondet    -  Acinetobacter baumanii: Nondet    -  Enterobacter cloacae complex: Nondet    -  Streptococcus sp. (Not Grp A, B or S pneumoniae): Nondet    -  Streptococcus agalactiae (Group B): Nondet    -  Streptococcus pyogenes (Group A): Nondet    -  Streptococcus pneumoniae: Nondet    -  Candida albicans: Nondet    -  Candida glabrata: Nondet    -  Candida krusei: Nondet    -  Candida parapsilosis: Nondet    -  Candida tropicalis: Nondet    -  Multidrug (KPC pos) resistant organism: Nondet    -  Staphylococcus aureus: Nondet    -  Methicillin resistant Staphylococcus aureus (MRSA): Nondet    -  Coagulase negative Staphylococcus: Nondet    Specimen Source: .Blood Blood-Peripheral    Organism: Blood Culture PCR    Culture Results:   Growth in aerobic bottle: Bacillus species not anthracis  ***Blood Panel PCR results on this specimen are available  approximately 3 hours after the Gram stain result.***  Gram stain, PCR, and/or culture results may not always  correspond due to difference in methodologies.  ************************************************************  This PCR assay was performed using [employer].  The following targets are tested for: Enterococcus,  vancomycin resistant enterococci, Listeria monocytogenes,  coagulase negative staphylococci, S. aureus,  methicillin resistant S. aureus, Streptococcus agalactiae  (Group B), S. pneumoniae, S. pyogenes (Group A),  Acinetobacter baumannii, Enterobacter cloacae, E. coli,  Klebsiella oxytoca, K. pneumoniae, Proteus sp.,  Serratia marcescens, Haemophilus influenzae,  Neisseria meningitidis, Pseudomonas aeruginosa, Candida  albicans, C. glabrata, C krusei, C parapsilosis,  C. tropicalis and the KPC resistance gene.    Organism Identification: Blood Culture PCR    Method Type: PCR

## 2017-10-26 NOTE — PROGRESS NOTE ADULT - ASSESSMENT
73 yo with DM,ESRD recent HD,CVA ,non verbal.Recent bacteremia,pyelo s/p antimicrobia   hypotension during HD    Blood Cx-CNS and Bacillus-Initial Cx negative  But 10/24 blood Cx again with GPC clusters    CT abd pelvis and CXR negative  Clinically stable  No fevers  ?Line related CNS BSI v contamination  Will recommend continue IV vanco 500 mg post HD X 7 days   Repeat surveillance cultures after vanco finishes  If still positive may need HD catheter removal  case d/w primary team  Will Follow.  Beeper [id number]-[id number]/afterhours/No response-[id number]

## 2017-10-26 NOTE — PROGRESS NOTE ADULT - PROBLEM SELECTOR PLAN 1
Prelim blood cultures with bacillus/coag negative staph likely contaminants- patient with hypotension @ rehab HD/ has been stable in the hospital.   - repeat BCx s/p 24 hours off abx growing gram + cocci in clusters in aerobic/anaerobic bottles  - CT A/P negative, CXR negative  - antibiotics discontinued on 10/23 no symptoms, will continue to monitor off of abx Prelim blood cultures with bacillus/coag negative staph likely contaminants- patient with hypotension @ rehab HD/ has been stable in the hospital.   - repeat BCx s/p 24 hours off abx growing gram + cocci in clusters in aerobic/anaerobic bottles  - CT A/P negative, CXR negative  - antibiotics discontinued on 10/23 but Vancomycin restarted due GPC in blood / fup ID and sens

## 2017-10-27 LAB
CULTURE RESULTS: SIGNIFICANT CHANGE UP
GLUCOSE BLDC GLUCOMTR-MCNC: 129 MG/DL — HIGH (ref 70–99)
GLUCOSE BLDC GLUCOMTR-MCNC: 132 MG/DL — HIGH (ref 70–99)
GLUCOSE BLDC GLUCOMTR-MCNC: 134 MG/DL — HIGH (ref 70–99)
GLUCOSE BLDC GLUCOMTR-MCNC: 143 MG/DL — HIGH (ref 70–99)
INR BLD: 1.71 RATIO — HIGH (ref 0.88–1.16)
PROTHROM AB SERPL-ACNC: 18.7 SEC — HIGH (ref 9.8–12.7)
SPECIMEN SOURCE: SIGNIFICANT CHANGE UP
VANCOMYCIN FLD-MCNC: 27.9 UG/ML

## 2017-10-27 PROCEDURE — 99232 SBSQ HOSP IP/OBS MODERATE 35: CPT

## 2017-10-27 PROCEDURE — 99233 SBSQ HOSP IP/OBS HIGH 50: CPT | Mod: GC

## 2017-10-27 RX ORDER — BACITRACIN ZINC 500 UNIT/G
1 OINTMENT IN PACKET (EA) TOPICAL
Qty: 0 | Refills: 0 | Status: DISCONTINUED | OUTPATIENT
Start: 2017-10-27 | End: 2017-11-16

## 2017-10-27 RX ORDER — WARFARIN SODIUM 2.5 MG/1
3 TABLET ORAL ONCE
Qty: 0 | Refills: 0 | Status: COMPLETED | OUTPATIENT
Start: 2017-10-27 | End: 2017-10-27

## 2017-10-27 RX ADMIN — Medication 5 MILLIGRAM(S): at 05:07

## 2017-10-27 RX ADMIN — CLOTRIMAZOLE AND BETAMETHASONE DIPROPIONATE 1 APPLICATION(S): 10; .5 CREAM TOPICAL at 17:15

## 2017-10-27 RX ADMIN — Medication 1 DROP(S): at 23:39

## 2017-10-27 RX ADMIN — Medication 1 DROP(S): at 12:35

## 2017-10-27 RX ADMIN — Medication 1 PUFF(S): at 10:01

## 2017-10-27 RX ADMIN — Medication 1 APPLICATION(S): at 22:50

## 2017-10-27 RX ADMIN — SEVELAMER CARBONATE 1600 MILLIGRAM(S): 2400 POWDER, FOR SUSPENSION ORAL at 10:00

## 2017-10-27 RX ADMIN — WARFARIN SODIUM 3 MILLIGRAM(S): 2.5 TABLET ORAL at 21:55

## 2017-10-27 RX ADMIN — Medication 1 DROP(S): at 05:08

## 2017-10-27 RX ADMIN — Medication 81 MILLIGRAM(S): at 10:00

## 2017-10-27 RX ADMIN — Medication 1 TABLET(S): at 10:02

## 2017-10-27 RX ADMIN — Medication 1 DROP(S): at 17:15

## 2017-10-27 RX ADMIN — SEVELAMER CARBONATE 1600 MILLIGRAM(S): 2400 POWDER, FOR SUSPENSION ORAL at 17:15

## 2017-10-27 RX ADMIN — PANTOPRAZOLE SODIUM 40 MILLIGRAM(S): 20 TABLET, DELAYED RELEASE ORAL at 05:07

## 2017-10-27 RX ADMIN — SEVELAMER CARBONATE 1600 MILLIGRAM(S): 2400 POWDER, FOR SUSPENSION ORAL at 12:34

## 2017-10-27 RX ADMIN — TAMSULOSIN HYDROCHLORIDE 0.4 MILLIGRAM(S): 0.4 CAPSULE ORAL at 21:56

## 2017-10-27 RX ADMIN — ATORVASTATIN CALCIUM 40 MILLIGRAM(S): 80 TABLET, FILM COATED ORAL at 21:56

## 2017-10-27 RX ADMIN — AMIODARONE HYDROCHLORIDE 200 MILLIGRAM(S): 400 TABLET ORAL at 05:08

## 2017-10-27 RX ADMIN — CLOTRIMAZOLE AND BETAMETHASONE DIPROPIONATE 1 APPLICATION(S): 10; .5 CREAM TOPICAL at 05:08

## 2017-10-27 RX ADMIN — CLOPIDOGREL BISULFATE 75 MILLIGRAM(S): 75 TABLET, FILM COATED ORAL at 10:01

## 2017-10-27 RX ADMIN — Medication 5 MILLIGRAM(S): at 17:15

## 2017-10-27 NOTE — PROGRESS NOTE ADULT - PROBLEM SELECTOR PLAN 1
Prelim blood cultures with bacillus/coag negative staph likely contaminants- patient with hypotension @ rehab HD/ has been stable in the hospital.   - repeat BCx s/p 24 hours off abx growing gram + cocci in clusters in aerobic/anaerobic bottles  - CT A/P negative, CXR negative  - antibiotics discontinued on 10/23 but Vancomycin restarted due GPC in blood / fup ID and sens Prelim blood cultures with bacillus/coag negative staph likely contaminants- patient with hypotension @ rehab HD/ has been stable in the hospital.   - repeat BCx s/p 24 hours off abx growing gram + cocci in clusters in aerobic/anaerobic bottles  - CT A/P negative, CXR negative  - antibiotics discontinued on 10/23 but Vancomycin restarted d/t GPC in blood  - as per ID, IV vanco 500 mg post HD X 7 days, repeat surveillance cultures after vanco finishes Prelim blood cultures with bacillus/coag negative staph likely contaminants- patient with hypotension @ rehab HD/ has been stable in the hospital.   - repeat BCx s/p 24 hours off abx growing gram + cocci in clusters in aerobic/anaerobic bottles/ COANS  - CT A/P negative, CXR negative  - antibiotics discontinued on 10/23 but Vancomycin restarted d/t GPC in blood  - as per ID, IV vanco 500 mg post HD X 7 days, repeat surveillance cultures after vanco finishes/ as discussed with ID / Dr Ruelas

## 2017-10-27 NOTE — PROGRESS NOTE ADULT - ASSESSMENT
72 year old male with PMH of DM, HTN, HLD, Embolic CVA with right hemiplegia, COPD, bipolar, bilateral nephrolithiasis and bladder stones, ASHD, discharged 10/18 after long hospital stay when initially presented with NSTEMI/pulm edema, UTI, VT s/p shock, embolic CVA, new AF, re-admitted after hypotensive episode in dialysis found with bacteremia x2/ with 2 different organisms likely contaminants.      now on vancomycin 500 mg with each dialysis. ID followup  dialysis in AM as ordered. will follow.

## 2017-10-27 NOTE — PROGRESS NOTE ADULT - SUBJECTIVE AND OBJECTIVE BOX
TE[employer] Medicine Intern: Oscar Lorenzo  Spectralink: [id number]  Pager: [phone]    HPI/INTERVAL HISTORY: Overnight, no acute events. Patient seen and examined at bedside this AM. Patient is nonverbal at baseline. No further bleeding on skin tag on L shoulder.    OBJECTIVE:  VITAL SIGNS:  ICU Vital Signs Last 24 Hrs  T(C): 36.7 (27 Oct 2017 04:30), Max: 36.8 (26 Oct 2017 21:35)  T(F): 98 (27 Oct 2017 04:30), Max: 98.3 (26 Oct 2017 21:35)  HR: 86 (27 Oct 2017 04:30) (84 - 94)  BP: 113/73 (27 Oct 2017 04:30) (100/63 - 137/70)  BP(mean): --  ABP: --  ABP(mean): --  RR: 18 (27 Oct 2017 04:30) (18 - 18)  SpO2: 96% (27 Oct 2017 04:30) (96% - 100%)        10-26 @ 07:01  -  10-27 @ 07:00  --------------------------------------------------------  IN: 1040 mL / OUT: 1300 mL / NET: -260 mL      CAPILLARY BLOOD GLUCOSE      POCT Blood Glucose.: 112 mg/dL (26 Oct 2017 21:12)      PHYSICAL EXAM:  GENERAL: NAD, well-developed, sleeping comfortably in bed  HEAD:  Atraumatic, Normocephalic  EYES: conjunctiva and sclera clear  NECK: Supple, No JVD  CHEST/LUNG: Clear to auscultation bilaterally; No wheeze  HEART: Regular rate and rhythm; holosytolic murmur, rubs, or gallops  ABDOMEN: Soft, Nontender, Nondistended; Bowel sounds present  EXTREMITIES:  2+ Peripheral Pulses, No clubbing, cyanosis, or edema  PSYCH: awake and alert, not following commands  SKIN: sacral decubitus stage 3 ulcer present, permacath site in R anterior chest wall in place, small blister on lateral edge of R thumb    LABS:                        9.8    7.1   )-----------( 226      ( 26 Oct 2017 06:40 )             29.3     10-26    139  |  97  |  75<H>  ----------------------------<  196<H>  3.6   |  22  |  7.26<H>    Ca    9.2      26 Oct 2017 06:40  Phos  4.9     10-26  Mg     2.2     10-26        PT/INR - ( 26 Oct 2017 06:40 )   PT: 14.7 sec;   INR: 1.35 ratio         PTT - ( 26 Oct 2017 06:40 )  PTT:31.7 sec          RADIOLOGY & ADDITIONAL TESTS: Reviewed.    ALBUTerol    90 MICROgram(s) HFA Inhaler 2 Puff(s) Inhalation every 6 hours PRN  amiodarone    Tablet 200 milliGRAM(s) Oral daily  artificial  tears Solution 1 Drop(s) Both EYES four times a day  aspirin enteric coated 81 milliGRAM(s) Oral daily  atorvastatin 40 milliGRAM(s) Oral at bedtime  busPIRone 5 milliGRAM(s) Oral two times a day  clopidogrel Tablet 75 milliGRAM(s) Oral daily  clotrimazole/betamethasone Cream 1 Application(s) Topical two times a day  dextrose 5%. 1000 milliLiter(s) IV Continuous <Continuous>  dextrose 50% Injectable 12.5 Gram(s) IV Push once  dextrose 50% Injectable 25 Gram(s) IV Push once  dextrose 50% Injectable 25 Gram(s) IV Push once  dextrose Gel 1 Dose(s) Oral once PRN  epoetin georgie Injectable 60059 Unit(s) IV Push <User Schedule>  fluticasone propionate   110 MICROgram(s) HFA Inhaler 1 Puff(s) Inhalation daily  glucagon  Injectable 1 milliGRAM(s) IntraMuscular once PRN  insulin lispro (HumaLOG) corrective regimen sliding scale   SubCutaneous Before meals and at bedtime  Nephro-sophie 1 Tablet(s) Oral daily  pantoprazole    Tablet 40 milliGRAM(s) Oral before breakfast  sevelamer carbonate Powder 1600 milliGRAM(s) Oral three times a day with meals  tamsulosin 0.4 milliGRAM(s) Oral at bedtime      No Known Allergies

## 2017-10-27 NOTE — PROGRESS NOTE ADULT - SUBJECTIVE AND OBJECTIVE BOX
JIMI BUCHANAN  72y  Male    Patient is a 72y old  Male who presents with a chief complaint of s/p cardiac cath (24 Oct 2017 15:51)    seen at bed side,. poorly arousable.       PAST MEDICAL & SURGICAL HISTORY:  CVA (cerebral vascular accident)  COPD (chronic obstructive pulmonary disease)  BPH (benign prostatic hyperplasia)  Bipolar affective disorder  CKD (chronic kidney disease)  Pancreatitis  Hypertension  Dyslipidemia  Diabetes mellitus  Coronary artery disease  HLD (hyperlipidemia)  Anemia  Acute renal failure  CKD (chronic kidney disease)  COPD (chronic obstructive pulmonary disease)  Fainting  Cardiac arrhythmia  Dizziness  Hydronephrosis  Cholecystitis  Bipolar 1 disorder  DM (diabetes mellitus)  HTN (hypertension)  Lumbar radiculopathy  Left heart failure  Reflux esophagitis  Coronary atherosclerosis  History of cardiac catheterization  No significant past surgical history          PHYSICAL EXAM:    T(C): 36.7 (10-27-17 @ 04:30), Max: 36.8 (10-26-17 @ 21:35)  HR: 86 (10-27-17 @ 04:30) (85 - 92)  BP: 113/73 (10-27-17 @ 04:30) (100/63 - 115/74)  RR: 18 (10-27-17 @ 04:30) (18 - 18)  SpO2: 96% (10-27-17 @ 04:30) (96% - 98%)  Wt(kg): --    I&O's Detail    26 Oct 2017 07:01  -  27 Oct 2017 07:00  --------------------------------------------------------  IN:    Oral Fluid: 340 mL    Other: 700 mL  Total IN: 1040 mL    OUT:    Other: 1300 mL  Total OUT: 1300 mL    Total NET: -260 mL          Respiratory: clear anteriorly, decreased BS at bases  Cardiovascular: S1 S2  Gastrointestinal: soft NT ND +BS  Extremities: edema   Neuro: Awake and alert    MEDICATIONS  (STANDING):  amiodarone    Tablet 200 milliGRAM(s) Oral daily  artificial  tears Solution 1 Drop(s) Both EYES four times a day  aspirin enteric coated 81 milliGRAM(s) Oral daily  atorvastatin 40 milliGRAM(s) Oral at bedtime  busPIRone 5 milliGRAM(s) Oral two times a day  clopidogrel Tablet 75 milliGRAM(s) Oral daily  clotrimazole/betamethasone Cream 1 Application(s) Topical two times a day  dextrose 5%. 1000 milliLiter(s) (50 mL/Hr) IV Continuous <Continuous>  dextrose 50% Injectable 12.5 Gram(s) IV Push once  dextrose 50% Injectable 25 Gram(s) IV Push once  dextrose 50% Injectable 25 Gram(s) IV Push once  epoetin georgie Injectable 56402 Unit(s) IV Push <User Schedule>  fluticasone propionate   110 MICROgram(s) HFA Inhaler 1 Puff(s) Inhalation daily  insulin lispro (HumaLOG) corrective regimen sliding scale   SubCutaneous Before meals and at bedtime  Nephro-sophie 1 Tablet(s) Oral daily  pantoprazole    Tablet 40 milliGRAM(s) Oral before breakfast  sevelamer carbonate Powder 1600 milliGRAM(s) Oral three times a day with meals  tamsulosin 0.4 milliGRAM(s) Oral at bedtime    MEDICATIONS  (PRN):  ALBUTerol    90 MICROgram(s) HFA Inhaler 2 Puff(s) Inhalation every 6 hours PRN Shortness of Breath and/or Wheezing  dextrose Gel 1 Dose(s) Oral once PRN Blood Glucose LESS THAN 70 milliGRAM(s)/deciliter  glucagon  Injectable 1 milliGRAM(s) IntraMuscular once PRN Glucose LESS THAN 70 milligrams/deciliter                            9.8    7.1   )-----------( 226      ( 26 Oct 2017 06:40 )             29.3       10-26    139  |  97  |  75<H>  ----------------------------<  196<H>  3.6   |  22  |  7.26<H>    Ca    9.2      26 Oct 2017 06:40  Phos  4.9     10-26  Mg     2.2     10-26    < from: CT Abdomen and Pelvis No Cont (10.22.17 @ 11:45) >  INTERPRETATION:  CLINICAL INFORMATION: Bacteremia, evaluate for   intra-abdominal pathology.    COMPARISON: CT abdomen/pelvis 9/23/2017.    PROCEDURE:   CT of the Abdomen and Pelvis was performed without intravenous contrast.   Intravenous contrast: None.  Oral contrast: None.  Sagittal and coronal reformats were performed.    FINDINGS:    LOWER CHEST: Minimal bibasilar linear atelectasis.. Coronary artery   atherosclerosis. Aortic valve and mitral annular calcifications. Venous   catheter terminates at the distal superior vena cava.    LIVER: Within normal limits.  BILE DUCTS: Normal caliber.  GALLBLADDER: Within normal limits.  SPLEEN: Within normal limits.  PANCREAS: Within normal limits.  ADRENALS: Within normal limits.  KIDNEYS/URETERS: Atrophic left kidney. Bilateral subcentimeter   nonobstructing renal calculi, unchanged. No hydronephrosis.    BLADDER: Multiple bladder calculi measuringup to 1.5 cm.. Small amount   of submucosal fat in the anterior bladder wall.  REPRODUCTIVE ORGANS: The prostate is within normal limits.    BOWEL: No bowel obstruction. Colonic diverticulosis. Appendix is normal.  PERITONEUM: No ascites.  VESSELS:  Atherosclerotic changes.  RETROPERITONEUM: No lymphadenopathy.    ABDOMINAL WALL: Within normal limits.  BONES: Degenerative changes of the spine.    IMPRESSION: No intra-abdominal source for bacteremia demonstrated.    Nonobstructing renal calculi and bladder calculi.

## 2017-10-27 NOTE — PROGRESS NOTE ADULT - ASSESSMENT
72 year old male with PMH of DM, HTN, HLD, Embolic CVA with right hemiplegia, COPD, bipolar, bilateral nephrolithiasis and bladder stones, ASHD, discharged 10/18 after long hospital stay when initially presented with NSTEMI/pulm edema, UTI, VT s/p shock, embolic CVA, new AF, re-admitted after hypotensive episode in dialysis found with bacteremia x2/ with 2 different organisms likely contaminants. 72 year old male with PMH of DM, HTN, HLD, Embolic CVA with right hemiplegia, COPD, bipolar, bilateral nephrolithiasis and bladder stones, ASHD, discharged 10/18 after long hospital stay when initially presented with NSTEMI/pulm edema, UTI, VT s/p shock, embolic CVA, new AF, re-admitted after hypotensive episode in dialysis found with bacteremia x2/ with 2 different organisms likely contaminants.  However, repeat blood culture done off antibiotics grew COANS and was restarted on Vancomycin per ID.

## 2017-10-27 NOTE — PROGRESS NOTE ADULT - ASSESSMENT
73 yo with DM,ESRD recent HD,CVA ,non verbal.Recent bacteremia,pyelo s/p antimicrobia   hypotension during HD    Blood Cx-CNS and Bacillus-Initial Cx negative  But 10/24 blood Cx again with CNS    CT abd pelvis and CXR negative  Clinically stable  No fevers  ?Line related CNS BSI v contamination  Will recommend continue IV vanco 500 mg post HD X 7 days   Repeat surveillance cultures after vanco finishes  If still positive may need HD catheter removal  case d/w primary team-Dr horan  Infectious Diseases Service will cover over weekend.  Please call [phone] if issues

## 2017-10-28 LAB
ALBUMIN SERPL ELPH-MCNC: 3.1 G/DL — LOW (ref 3.3–5)
ALP SERPL-CCNC: 86 U/L — SIGNIFICANT CHANGE UP (ref 40–120)
ALT FLD-CCNC: 37 U/L RC — SIGNIFICANT CHANGE UP (ref 10–45)
ANION GAP SERPL CALC-SCNC: 18 MMOL/L — HIGH (ref 5–17)
APTT BLD: 31.9 SEC — SIGNIFICANT CHANGE UP (ref 27.5–37.4)
AST SERPL-CCNC: 32 U/L — SIGNIFICANT CHANGE UP (ref 10–40)
BASOPHILS # BLD AUTO: 0.1 K/UL — SIGNIFICANT CHANGE UP (ref 0–0.2)
BASOPHILS NFR BLD AUTO: 1 % — SIGNIFICANT CHANGE UP (ref 0–2)
BILIRUB SERPL-MCNC: 0.3 MG/DL — SIGNIFICANT CHANGE UP (ref 0.2–1.2)
BUN SERPL-MCNC: 60 MG/DL — HIGH (ref 7–23)
CALCIUM SERPL-MCNC: 9.3 MG/DL — SIGNIFICANT CHANGE UP (ref 8.4–10.5)
CHLORIDE SERPL-SCNC: 97 MMOL/L — SIGNIFICANT CHANGE UP (ref 96–108)
CO2 SERPL-SCNC: 24 MMOL/L — SIGNIFICANT CHANGE UP (ref 22–31)
CREAT SERPL-MCNC: 5.89 MG/DL — HIGH (ref 0.5–1.3)
EOSINOPHIL # BLD AUTO: 0.5 K/UL — SIGNIFICANT CHANGE UP (ref 0–0.5)
EOSINOPHIL NFR BLD AUTO: 7.2 % — HIGH (ref 0–6)
GLUCOSE BLDC GLUCOMTR-MCNC: 102 MG/DL — HIGH (ref 70–99)
GLUCOSE BLDC GLUCOMTR-MCNC: 123 MG/DL — HIGH (ref 70–99)
GLUCOSE BLDC GLUCOMTR-MCNC: 125 MG/DL — HIGH (ref 70–99)
GLUCOSE BLDC GLUCOMTR-MCNC: 220 MG/DL — HIGH (ref 70–99)
GLUCOSE SERPL-MCNC: 106 MG/DL — HIGH (ref 70–99)
HCT VFR BLD CALC: 31.5 % — LOW (ref 39–50)
HGB BLD-MCNC: 10.1 G/DL — LOW (ref 13–17)
INR BLD: 1.6 RATIO — HIGH (ref 0.88–1.16)
LYMPHOCYTES # BLD AUTO: 2.2 K/UL — SIGNIFICANT CHANGE UP (ref 1–3.3)
LYMPHOCYTES # BLD AUTO: 31.1 % — SIGNIFICANT CHANGE UP (ref 13–44)
MCHC RBC-ENTMCNC: 29.1 PG — SIGNIFICANT CHANGE UP (ref 27–34)
MCHC RBC-ENTMCNC: 32 GM/DL — SIGNIFICANT CHANGE UP (ref 32–36)
MCV RBC AUTO: 90.8 FL — SIGNIFICANT CHANGE UP (ref 80–100)
MONOCYTES # BLD AUTO: 0.6 K/UL — SIGNIFICANT CHANGE UP (ref 0–0.9)
MONOCYTES NFR BLD AUTO: 8.5 % — SIGNIFICANT CHANGE UP (ref 2–14)
NEUTROPHILS # BLD AUTO: 3.6 K/UL — SIGNIFICANT CHANGE UP (ref 1.8–7.4)
NEUTROPHILS NFR BLD AUTO: 52.1 % — SIGNIFICANT CHANGE UP (ref 43–77)
PLATELET # BLD AUTO: 251 K/UL — SIGNIFICANT CHANGE UP (ref 150–400)
POTASSIUM SERPL-MCNC: 3.8 MMOL/L — SIGNIFICANT CHANGE UP (ref 3.5–5.3)
POTASSIUM SERPL-SCNC: 3.8 MMOL/L — SIGNIFICANT CHANGE UP (ref 3.5–5.3)
PROT SERPL-MCNC: 6.5 G/DL — SIGNIFICANT CHANGE UP (ref 6–8.3)
PROTHROM AB SERPL-ACNC: 17.5 SEC — HIGH (ref 9.8–12.7)
RBC # BLD: 3.46 M/UL — LOW (ref 4.2–5.8)
RBC # FLD: 13.4 % — SIGNIFICANT CHANGE UP (ref 10.3–14.5)
SODIUM SERPL-SCNC: 139 MMOL/L — SIGNIFICANT CHANGE UP (ref 135–145)
WBC # BLD: 7 K/UL — SIGNIFICANT CHANGE UP (ref 3.8–10.5)
WBC # FLD AUTO: 7 K/UL — SIGNIFICANT CHANGE UP (ref 3.8–10.5)

## 2017-10-28 PROCEDURE — 99233 SBSQ HOSP IP/OBS HIGH 50: CPT | Mod: GC

## 2017-10-28 RX ORDER — VANCOMYCIN HCL 1 G
500 VIAL (EA) INTRAVENOUS ONCE
Qty: 0 | Refills: 0 | Status: COMPLETED | OUTPATIENT
Start: 2017-10-28 | End: 2017-10-28

## 2017-10-28 RX ORDER — WARFARIN SODIUM 2.5 MG/1
3 TABLET ORAL ONCE
Qty: 0 | Refills: 0 | Status: COMPLETED | OUTPATIENT
Start: 2017-10-28 | End: 2017-10-28

## 2017-10-28 RX ADMIN — Medication 5 MILLIGRAM(S): at 06:00

## 2017-10-28 RX ADMIN — AMIODARONE HYDROCHLORIDE 200 MILLIGRAM(S): 400 TABLET ORAL at 05:59

## 2017-10-28 RX ADMIN — Medication 1 DROP(S): at 18:04

## 2017-10-28 RX ADMIN — PANTOPRAZOLE SODIUM 40 MILLIGRAM(S): 20 TABLET, DELAYED RELEASE ORAL at 06:17

## 2017-10-28 RX ADMIN — WARFARIN SODIUM 3 MILLIGRAM(S): 2.5 TABLET ORAL at 21:28

## 2017-10-28 RX ADMIN — Medication 5 MILLIGRAM(S): at 18:02

## 2017-10-28 RX ADMIN — Medication 1 APPLICATION(S): at 05:59

## 2017-10-28 RX ADMIN — SEVELAMER CARBONATE 1600 MILLIGRAM(S): 2400 POWDER, FOR SUSPENSION ORAL at 09:16

## 2017-10-28 RX ADMIN — ATORVASTATIN CALCIUM 40 MILLIGRAM(S): 80 TABLET, FILM COATED ORAL at 21:50

## 2017-10-28 RX ADMIN — CLOPIDOGREL BISULFATE 75 MILLIGRAM(S): 75 TABLET, FILM COATED ORAL at 18:02

## 2017-10-28 RX ADMIN — Medication 1 TABLET(S): at 18:06

## 2017-10-28 RX ADMIN — SEVELAMER CARBONATE 1600 MILLIGRAM(S): 2400 POWDER, FOR SUSPENSION ORAL at 18:02

## 2017-10-28 RX ADMIN — Medication 1 DROP(S): at 05:59

## 2017-10-28 RX ADMIN — Medication 1 APPLICATION(S): at 18:02

## 2017-10-28 RX ADMIN — Medication 1 PUFF(S): at 18:06

## 2017-10-28 RX ADMIN — ERYTHROPOIETIN 10000 UNIT(S): 10000 INJECTION, SOLUTION INTRAVENOUS; SUBCUTANEOUS at 12:45

## 2017-10-28 RX ADMIN — Medication 1 DROP(S): at 23:15

## 2017-10-28 RX ADMIN — Medication 2: at 21:26

## 2017-10-28 RX ADMIN — TAMSULOSIN HYDROCHLORIDE 0.4 MILLIGRAM(S): 0.4 CAPSULE ORAL at 21:29

## 2017-10-28 RX ADMIN — Medication 100 MILLIGRAM(S): at 23:14

## 2017-10-28 RX ADMIN — CLOTRIMAZOLE AND BETAMETHASONE DIPROPIONATE 1 APPLICATION(S): 10; .5 CREAM TOPICAL at 18:03

## 2017-10-28 RX ADMIN — Medication 81 MILLIGRAM(S): at 18:02

## 2017-10-28 RX ADMIN — CLOTRIMAZOLE AND BETAMETHASONE DIPROPIONATE 1 APPLICATION(S): 10; .5 CREAM TOPICAL at 06:00

## 2017-10-28 NOTE — PROGRESS NOTE ADULT - SUBJECTIVE AND OBJECTIVE BOX
[employer] Intern: Oscar Lorenzo  Spectralink: [phone]  Pager: [phone]    HPI/INTERVAL HISTORY: Overnight, no acute events. VSS. Patient seen and examined at bedside this AM. Patient nonverbal at baseline.    OBJECTIVE:  VITAL SIGNS:  ICU Vital Signs Last 24 Hrs  T(C): 36.9 (28 Oct 2017 04:42), Max: 37.3 (27 Oct 2017 12:48)  T(F): 98.5 (28 Oct 2017 04:42), Max: 99.2 (27 Oct 2017 12:48)  HR: 85 (28 Oct 2017 04:42) (80 - 89)  BP: 158/90 (28 Oct 2017 04:42) (110/68 - 158/90)  BP(mean): --  ABP: --  ABP(mean): --  RR: 18 (28 Oct 2017 04:42) (18 - 18)  SpO2: 96% (28 Oct 2017 04:42) (96% - 97%)        10-27 @ 07:01  -  10-28 @ 07:00  --------------------------------------------------------  IN: 600 mL / OUT: 0 mL / NET: 600 mL      CAPILLARY BLOOD GLUCOSE      POCT Blood Glucose.: 123 mg/dL (28 Oct 2017 08:30)      PHYSICAL EXAM:  GENERAL: NAD, well-developed, sleeping comfortably in bed  HEAD:  Atraumatic, Normocephalic  EYES: conjunctiva and sclera clear  NECK: Supple, No JVD  CHEST/LUNG: Clear to auscultation bilaterally; No wheeze  HEART: Regular rate and rhythm; holosytolic murmur, rubs, or gallops  ABDOMEN: Soft, Nontender, Nondistended; Bowel sounds present  EXTREMITIES:  2+ Peripheral Pulses, No clubbing, cyanosis, or edema  PSYCH: awake and alert, not following commands  SKIN: sacral decubitus stage 3 ulcer present, permacath site in R anterior chest wall in place, small blister on lateral edge of R thumb    LABS:                        10.1   7.0   )-----------( 251      ( 28 Oct 2017 07:23 )             31.5     10-28    139  |  97  |  60<H>  ----------------------------<  106<H>  3.8   |  24  |  5.89<H>    Ca    9.3      28 Oct 2017 07:13    TPro  6.5  /  Alb  3.1<L>  /  TBili  0.3  /  DBili  x   /  AST  32  /  ALT  37  /  AlkPhos  86  10-28    LIVER FUNCTIONS - ( 28 Oct 2017 07:13 )  Alb: 3.1 g/dL / Pro: 6.5 g/dL / ALK PHOS: 86 U/L / ALT: 37 U/L RC / AST: 32 U/L / GGT: x           PT/INR - ( 28 Oct 2017 07:23 )   PT: 17.5 sec;   INR: 1.60 ratio         PTT - ( 28 Oct 2017 07:23 )  PTT:31.9 sec          RADIOLOGY & ADDITIONAL TESTS: Reviewed.    ALBUTerol    90 MICROgram(s) HFA Inhaler 2 Puff(s) Inhalation every 6 hours PRN  amiodarone    Tablet 200 milliGRAM(s) Oral daily  artificial  tears Solution 1 Drop(s) Both EYES four times a day  aspirin enteric coated 81 milliGRAM(s) Oral daily  atorvastatin 40 milliGRAM(s) Oral at bedtime  BACItracin   Ointment 1 Application(s) Topical two times a day  busPIRone 5 milliGRAM(s) Oral two times a day  clopidogrel Tablet 75 milliGRAM(s) Oral daily  clotrimazole/betamethasone Cream 1 Application(s) Topical two times a day  dextrose 5%. 1000 milliLiter(s) IV Continuous <Continuous>  dextrose 50% Injectable 12.5 Gram(s) IV Push once  dextrose 50% Injectable 25 Gram(s) IV Push once  dextrose 50% Injectable 25 Gram(s) IV Push once  dextrose Gel 1 Dose(s) Oral once PRN  epoetin georgie Injectable 94008 Unit(s) IV Push <User Schedule>  fluticasone propionate   110 MICROgram(s) HFA Inhaler 1 Puff(s) Inhalation daily  glucagon  Injectable 1 milliGRAM(s) IntraMuscular once PRN  insulin lispro (HumaLOG) corrective regimen sliding scale   SubCutaneous Before meals and at bedtime  Nephro-sophie 1 Tablet(s) Oral daily  pantoprazole    Tablet 40 milliGRAM(s) Oral before breakfast  sevelamer carbonate Powder 1600 milliGRAM(s) Oral three times a day with meals  tamsulosin 0.4 milliGRAM(s) Oral at bedtime      No Known Allergies

## 2017-10-28 NOTE — PROGRESS NOTE ADULT - ASSESSMENT
72 year old male with PMH of DM, HTN, HLD, Embolic CVA with right hemiplegia, COPD, bipolar, bilateral nephrolithiasis and bladder stones, ASHD, discharged 10/18 after long hospital stay when initially presented with NSTEMI/pulm edema, UTI, VT s/p shock, embolic CVA, new AF, re-admitted after hypotensive episode in dialysis found with bacteremia x2/ with 2 different organisms likely contaminants.  However, repeat blood culture done off antibiotics grew COANS and was restarted on Vancomycin per ID.

## 2017-10-28 NOTE — PROGRESS NOTE ADULT - SUBJECTIVE AND OBJECTIVE BOX
NEPHROLOGY PROGRESS NOTE    CHIEF COMPLAINT:  ESRD    He is due for dialysis later today    ROS:  somnolent    EXAM:  T(F): 98.5 (10-28-17 @ 04:42)  HR: 85 (10-28-17 @ 04:42)  BP: 158/90 (10-28-17 @ 04:42)  RR: 18 (10-28-17 @ 04:42)  SpO2: 96% (10-28-17 @ 04:42)    Normal respiratory effort, lungs clear bilaterally  Heart RRR with no murmur, no peripheral edema         LABS                             10.1   7.0   )-----------( 251      ( 28 Oct 2017 07:23 )             31.5          10-28    139  |  97  |  60<H>  ----------------------------<  106<H>  3.8   |  24  |  5.89<H>    Ca    9.3      28 Oct 2017 07:13    TPro  6.5  /  Alb  3.1<L>  /  TBili  0.3  /  DBili  x   /  AST  32  /  ALT  37  /  AlkPhos  86  10-28      ASSESSMENT:  1.  ESRD on hemodialysis  2.  Gram positive bacteremia    PLAN:  Dialysis will be ordered later today  Vancomycin may be dosed at any time since it is not a dialyzable drug

## 2017-10-28 NOTE — PROGRESS NOTE ADULT - PROBLEM SELECTOR PLAN 1
Prelim blood cultures with bacillus/coag negative staph likely contaminants- patient with hypotension @ rehab HD/ has been stable in the hospital.   - repeat BCx s/p 24 hours off abx growing gram + cocci in clusters in aerobic/anaerobic bottles/ COANS  - CT A/P negative, CXR negative  - antibiotics discontinued on 10/23 but Vancomycin restarted d/t GPC in blood  - as per ID, IV vanco 500 mg post HD X 7 days, repeat surveillance cultures after vanco finishes/ as discussed with ID / Dr Ruelas

## 2017-10-29 LAB
ANION GAP SERPL CALC-SCNC: 16 MMOL/L — SIGNIFICANT CHANGE UP (ref 5–17)
APTT BLD: 33.8 SEC — SIGNIFICANT CHANGE UP (ref 27.5–37.4)
BUN SERPL-MCNC: 34 MG/DL — HIGH (ref 7–23)
CALCIUM SERPL-MCNC: 9.1 MG/DL — SIGNIFICANT CHANGE UP (ref 8.4–10.5)
CHLORIDE SERPL-SCNC: 98 MMOL/L — SIGNIFICANT CHANGE UP (ref 96–108)
CO2 SERPL-SCNC: 26 MMOL/L — SIGNIFICANT CHANGE UP (ref 22–31)
CREAT SERPL-MCNC: 3.73 MG/DL — HIGH (ref 0.5–1.3)
CULTURE RESULTS: SIGNIFICANT CHANGE UP
GLUCOSE BLDC GLUCOMTR-MCNC: 106 MG/DL — HIGH (ref 70–99)
GLUCOSE BLDC GLUCOMTR-MCNC: 130 MG/DL — HIGH (ref 70–99)
GLUCOSE BLDC GLUCOMTR-MCNC: 153 MG/DL — HIGH (ref 70–99)
GLUCOSE BLDC GLUCOMTR-MCNC: 169 MG/DL — HIGH (ref 70–99)
GLUCOSE SERPL-MCNC: 121 MG/DL — HIGH (ref 70–99)
HCT VFR BLD CALC: 31.1 % — LOW (ref 39–50)
HGB BLD-MCNC: 9.8 G/DL — LOW (ref 13–17)
INR BLD: 1.79 RATIO — HIGH (ref 0.88–1.16)
MAGNESIUM SERPL-MCNC: 2.1 MG/DL — SIGNIFICANT CHANGE UP (ref 1.6–2.6)
MCHC RBC-ENTMCNC: 28.7 PG — SIGNIFICANT CHANGE UP (ref 27–34)
MCHC RBC-ENTMCNC: 31.6 GM/DL — LOW (ref 32–36)
MCV RBC AUTO: 90.8 FL — SIGNIFICANT CHANGE UP (ref 80–100)
PHOSPHATE SERPL-MCNC: 2.8 MG/DL — SIGNIFICANT CHANGE UP (ref 2.5–4.5)
PLATELET # BLD AUTO: 279 K/UL — SIGNIFICANT CHANGE UP (ref 150–400)
POTASSIUM SERPL-MCNC: 3.7 MMOL/L — SIGNIFICANT CHANGE UP (ref 3.5–5.3)
POTASSIUM SERPL-SCNC: 3.7 MMOL/L — SIGNIFICANT CHANGE UP (ref 3.5–5.3)
PROTHROM AB SERPL-ACNC: 19.8 SEC — HIGH (ref 9.8–12.7)
RBC # BLD: 3.43 M/UL — LOW (ref 4.2–5.8)
RBC # FLD: 13.5 % — SIGNIFICANT CHANGE UP (ref 10.3–14.5)
SODIUM SERPL-SCNC: 140 MMOL/L — SIGNIFICANT CHANGE UP (ref 135–145)
SPECIMEN SOURCE: SIGNIFICANT CHANGE UP
WBC # BLD: 6.8 K/UL — SIGNIFICANT CHANGE UP (ref 3.8–10.5)
WBC # FLD AUTO: 6.8 K/UL — SIGNIFICANT CHANGE UP (ref 3.8–10.5)

## 2017-10-29 PROCEDURE — 99233 SBSQ HOSP IP/OBS HIGH 50: CPT | Mod: GC

## 2017-10-29 RX ORDER — WARFARIN SODIUM 2.5 MG/1
3 TABLET ORAL ONCE
Qty: 0 | Refills: 0 | Status: COMPLETED | OUTPATIENT
Start: 2017-10-29 | End: 2017-10-29

## 2017-10-29 RX ADMIN — AMIODARONE HYDROCHLORIDE 200 MILLIGRAM(S): 400 TABLET ORAL at 05:40

## 2017-10-29 RX ADMIN — WARFARIN SODIUM 3 MILLIGRAM(S): 2.5 TABLET ORAL at 22:11

## 2017-10-29 RX ADMIN — Medication 1 DROP(S): at 22:11

## 2017-10-29 RX ADMIN — Medication 1 TABLET(S): at 09:35

## 2017-10-29 RX ADMIN — Medication 1 DROP(S): at 13:59

## 2017-10-29 RX ADMIN — Medication 1 APPLICATION(S): at 05:40

## 2017-10-29 RX ADMIN — CLOTRIMAZOLE AND BETAMETHASONE DIPROPIONATE 1 APPLICATION(S): 10; .5 CREAM TOPICAL at 05:41

## 2017-10-29 RX ADMIN — Medication 5 MILLIGRAM(S): at 05:40

## 2017-10-29 RX ADMIN — CLOPIDOGREL BISULFATE 75 MILLIGRAM(S): 75 TABLET, FILM COATED ORAL at 09:36

## 2017-10-29 RX ADMIN — Medication 1 DROP(S): at 18:32

## 2017-10-29 RX ADMIN — SEVELAMER CARBONATE 1600 MILLIGRAM(S): 2400 POWDER, FOR SUSPENSION ORAL at 09:35

## 2017-10-29 RX ADMIN — Medication 1 APPLICATION(S): at 18:32

## 2017-10-29 RX ADMIN — SEVELAMER CARBONATE 1600 MILLIGRAM(S): 2400 POWDER, FOR SUSPENSION ORAL at 18:31

## 2017-10-29 RX ADMIN — Medication 5 MILLIGRAM(S): at 18:32

## 2017-10-29 RX ADMIN — SEVELAMER CARBONATE 1600 MILLIGRAM(S): 2400 POWDER, FOR SUSPENSION ORAL at 13:59

## 2017-10-29 RX ADMIN — ATORVASTATIN CALCIUM 40 MILLIGRAM(S): 80 TABLET, FILM COATED ORAL at 22:11

## 2017-10-29 RX ADMIN — Medication 1: at 22:11

## 2017-10-29 RX ADMIN — CLOTRIMAZOLE AND BETAMETHASONE DIPROPIONATE 1 APPLICATION(S): 10; .5 CREAM TOPICAL at 18:34

## 2017-10-29 RX ADMIN — TAMSULOSIN HYDROCHLORIDE 0.4 MILLIGRAM(S): 0.4 CAPSULE ORAL at 22:11

## 2017-10-29 RX ADMIN — PANTOPRAZOLE SODIUM 40 MILLIGRAM(S): 20 TABLET, DELAYED RELEASE ORAL at 09:36

## 2017-10-29 RX ADMIN — Medication 1 PUFF(S): at 18:34

## 2017-10-29 RX ADMIN — Medication 81 MILLIGRAM(S): at 09:36

## 2017-10-29 RX ADMIN — Medication 1: at 14:00

## 2017-10-29 RX ADMIN — Medication 1 DROP(S): at 05:41

## 2017-10-29 NOTE — PROGRESS NOTE ADULT - SUBJECTIVE AND OBJECTIVE BOX
[employer] Intern: Oscar Lorenzo  Spectralink: [phone]  Pager: [phone]    HPI/INTERVAL HISTORY: Overnight, no acute events. VSS. Patient seen and examined at bedside this AM. Patient nonverbal at baseline.    OBJECTIVE:  VITAL SIGNS:  ICU Vital Signs Last 24 Hrs  T(C): 36.5 (29 Oct 2017 04:00), Max: 36.8 (28 Oct 2017 21:49)  T(F): 97.7 (29 Oct 2017 04:00), Max: 98.3 (28 Oct 2017 21:49)  HR: 85 (29 Oct 2017 04:00) (84 - 86)  BP: 126/79 (29 Oct 2017 04:00) (122/76 - 149/77)  BP(mean): --  ABP: --  ABP(mean): --  RR: 18 (29 Oct 2017 04:00) (17 - 18)  SpO2: 100% (29 Oct 2017 04:00) (99% - 100%)        10-28 @ 07:01  -  10-29 @ 07:00  --------------------------------------------------------  IN: 560 mL / OUT: 800 mL / NET: -240 mL      CAPILLARY BLOOD GLUCOSE      POCT Blood Glucose.: 130 mg/dL (29 Oct 2017 08:36)      PHYSICAL EXAM:  GENERAL: NAD, well-developed, sleeping comfortably in bed  HEAD:  Atraumatic, Normocephalic  EYES: conjunctiva and sclera clear  NECK: Supple, No JVD  CHEST/LUNG: Clear to auscultation bilaterally; No wheeze  HEART: Regular rate and rhythm; holosytolic murmur, rubs, or gallops  ABDOMEN: Soft, Nontender, Nondistended; Bowel sounds present  EXTREMITIES:  2+ Peripheral Pulses, No clubbing, cyanosis, or edema  PSYCH: awake and alert, not following commands  SKIN: sacral decubitus stage 3 ulcer present, permacath site in R anterior chest wall in place, small blister on lateral edge of R thumb        LABS:                        9.8    6.8   )-----------( 279      ( 29 Oct 2017 07:07 )             31.1     10-29    140  |  98  |  34<H>  ----------------------------<  121<H>  3.7   |  26  |  3.73<H>    Ca    9.1      29 Oct 2017 07:07  Phos  2.8     10-29  Mg     2.1     10-29    TPro  6.5  /  Alb  3.1<L>  /  TBili  0.3  /  DBili  x   /  AST  32  /  ALT  37  /  AlkPhos  86  10-28    LIVER FUNCTIONS - ( 28 Oct 2017 07:13 )  Alb: 3.1 g/dL / Pro: 6.5 g/dL / ALK PHOS: 86 U/L / ALT: 37 U/L RC / AST: 32 U/L / GGT: x           PT/INR - ( 29 Oct 2017 07:07 )   PT: 19.8 sec;   INR: 1.79 ratio         PTT - ( 29 Oct 2017 07:07 )  PTT:33.8 sec          RADIOLOGY & ADDITIONAL TESTS: Reviewed.    ALBUTerol    90 MICROgram(s) HFA Inhaler 2 Puff(s) Inhalation every 6 hours PRN  amiodarone    Tablet 200 milliGRAM(s) Oral daily  artificial  tears Solution 1 Drop(s) Both EYES four times a day  aspirin enteric coated 81 milliGRAM(s) Oral daily  atorvastatin 40 milliGRAM(s) Oral at bedtime  BACItracin   Ointment 1 Application(s) Topical two times a day  busPIRone 5 milliGRAM(s) Oral two times a day  clopidogrel Tablet 75 milliGRAM(s) Oral daily  clotrimazole/betamethasone Cream 1 Application(s) Topical two times a day  dextrose 5%. 1000 milliLiter(s) IV Continuous <Continuous>  dextrose 50% Injectable 12.5 Gram(s) IV Push once  dextrose 50% Injectable 25 Gram(s) IV Push once  dextrose 50% Injectable 25 Gram(s) IV Push once  dextrose Gel 1 Dose(s) Oral once PRN  epoetin georgie Injectable 48950 Unit(s) IV Push <User Schedule>  fluticasone propionate   110 MICROgram(s) HFA Inhaler 1 Puff(s) Inhalation daily  glucagon  Injectable 1 milliGRAM(s) IntraMuscular once PRN  insulin lispro (HumaLOG) corrective regimen sliding scale   SubCutaneous Before meals and at bedtime  Nephro-sophie 1 Tablet(s) Oral daily  pantoprazole    Tablet 40 milliGRAM(s) Oral before breakfast  sevelamer carbonate Powder 1600 milliGRAM(s) Oral three times a day with meals  tamsulosin 0.4 milliGRAM(s) Oral at bedtime      No Known Allergies

## 2017-10-29 NOTE — PROGRESS NOTE ADULT - PROBLEM SELECTOR PLAN 1
Prelim blood cultures with bacillus/coag negative staph likely contaminants- patient with hypotension @ rehab HD/ has been stable in the hospital.   - repeat BCx s/p 24 hours off abx growing gram + cocci in clusters in aerobic/anaerobic bottles/ COANS  - CT A/P negative, CXR negative  - antibiotics discontinued on 10/23 but Vancomycin restarted d/t GPC in blood  - as per ID, IV vanco 500 mg post HD X 7 days, repeat surveillance cultures after vanco finishes/ as discussed with ID / Dr Ruelas Prelim blood cultures with bacillus/coag negative staph likely contaminants- patient with hypotension @ rehab HD/ has been stable in the hospital.   - repeat BCx s/p 24 hours off abx growing gram + cocci in clusters in aerobic/anaerobic bottles/ COANS  - CT A/P negative, CXR negative  - antibiotics discontinued on 10/23 but Vancomycin restarted d/t GPC in blood  - as per ID, IV vanco 500 mg post HD X 7 days, repeat surveillance cultures after vanco finishes/ as discussed with ID / Dr Ruelas (10/29 is day 3)

## 2017-10-30 LAB
ANION GAP SERPL CALC-SCNC: 17 MMOL/L — SIGNIFICANT CHANGE UP (ref 5–17)
APTT BLD: 36.4 SEC — SIGNIFICANT CHANGE UP (ref 27.5–37.4)
BUN SERPL-MCNC: 51 MG/DL — HIGH (ref 7–23)
CALCIUM SERPL-MCNC: 9 MG/DL — SIGNIFICANT CHANGE UP (ref 8.4–10.5)
CHLORIDE SERPL-SCNC: 100 MMOL/L — SIGNIFICANT CHANGE UP (ref 96–108)
CO2 SERPL-SCNC: 24 MMOL/L — SIGNIFICANT CHANGE UP (ref 22–31)
CREAT SERPL-MCNC: 5.52 MG/DL — HIGH (ref 0.5–1.3)
GLUCOSE BLDC GLUCOMTR-MCNC: 106 MG/DL — HIGH (ref 70–99)
GLUCOSE BLDC GLUCOMTR-MCNC: 115 MG/DL — HIGH (ref 70–99)
GLUCOSE BLDC GLUCOMTR-MCNC: 148 MG/DL — HIGH (ref 70–99)
GLUCOSE BLDC GLUCOMTR-MCNC: 200 MG/DL — HIGH (ref 70–99)
GLUCOSE SERPL-MCNC: 97 MG/DL — SIGNIFICANT CHANGE UP (ref 70–99)
HCT VFR BLD CALC: 29.5 % — LOW (ref 39–50)
HGB BLD-MCNC: 9.7 G/DL — LOW (ref 13–17)
INR BLD: 2.24 RATIO — HIGH (ref 0.88–1.16)
MAGNESIUM SERPL-MCNC: 2.2 MG/DL — SIGNIFICANT CHANGE UP (ref 1.6–2.6)
MCHC RBC-ENTMCNC: 29.6 PG — SIGNIFICANT CHANGE UP (ref 27–34)
MCHC RBC-ENTMCNC: 32.7 GM/DL — SIGNIFICANT CHANGE UP (ref 32–36)
MCV RBC AUTO: 90.5 FL — SIGNIFICANT CHANGE UP (ref 80–100)
PHOSPHATE SERPL-MCNC: 2.7 MG/DL — SIGNIFICANT CHANGE UP (ref 2.5–4.5)
PLATELET # BLD AUTO: 286 K/UL — SIGNIFICANT CHANGE UP (ref 150–400)
POTASSIUM SERPL-MCNC: 3.7 MMOL/L — SIGNIFICANT CHANGE UP (ref 3.5–5.3)
POTASSIUM SERPL-SCNC: 3.7 MMOL/L — SIGNIFICANT CHANGE UP (ref 3.5–5.3)
PROTHROM AB SERPL-ACNC: 24.6 SEC — HIGH (ref 9.8–12.7)
RBC # BLD: 3.26 M/UL — LOW (ref 4.2–5.8)
RBC # FLD: 13.8 % — SIGNIFICANT CHANGE UP (ref 10.3–14.5)
SODIUM SERPL-SCNC: 141 MMOL/L — SIGNIFICANT CHANGE UP (ref 135–145)
WBC # BLD: 6.7 K/UL — SIGNIFICANT CHANGE UP (ref 3.8–10.5)
WBC # FLD AUTO: 6.7 K/UL — SIGNIFICANT CHANGE UP (ref 3.8–10.5)

## 2017-10-30 PROCEDURE — 99232 SBSQ HOSP IP/OBS MODERATE 35: CPT

## 2017-10-30 PROCEDURE — 99233 SBSQ HOSP IP/OBS HIGH 50: CPT

## 2017-10-30 RX ORDER — WARFARIN SODIUM 2.5 MG/1
3 TABLET ORAL ONCE
Qty: 0 | Refills: 0 | Status: COMPLETED | OUTPATIENT
Start: 2017-10-30 | End: 2017-10-30

## 2017-10-30 RX ADMIN — TAMSULOSIN HYDROCHLORIDE 0.4 MILLIGRAM(S): 0.4 CAPSULE ORAL at 22:42

## 2017-10-30 RX ADMIN — Medication 1 DROP(S): at 18:31

## 2017-10-30 RX ADMIN — Medication 1 TABLET(S): at 13:38

## 2017-10-30 RX ADMIN — CLOPIDOGREL BISULFATE 75 MILLIGRAM(S): 75 TABLET, FILM COATED ORAL at 13:39

## 2017-10-30 RX ADMIN — Medication 1 DROP(S): at 23:15

## 2017-10-30 RX ADMIN — Medication 5 MILLIGRAM(S): at 18:31

## 2017-10-30 RX ADMIN — ATORVASTATIN CALCIUM 40 MILLIGRAM(S): 80 TABLET, FILM COATED ORAL at 22:42

## 2017-10-30 RX ADMIN — Medication 5 MILLIGRAM(S): at 05:33

## 2017-10-30 RX ADMIN — Medication 81 MILLIGRAM(S): at 13:38

## 2017-10-30 RX ADMIN — Medication 1 DROP(S): at 13:37

## 2017-10-30 RX ADMIN — Medication 1 PUFF(S): at 18:32

## 2017-10-30 RX ADMIN — CLOTRIMAZOLE AND BETAMETHASONE DIPROPIONATE 1 APPLICATION(S): 10; .5 CREAM TOPICAL at 05:33

## 2017-10-30 RX ADMIN — AMIODARONE HYDROCHLORIDE 200 MILLIGRAM(S): 400 TABLET ORAL at 05:33

## 2017-10-30 RX ADMIN — WARFARIN SODIUM 3 MILLIGRAM(S): 2.5 TABLET ORAL at 22:42

## 2017-10-30 RX ADMIN — Medication 1 APPLICATION(S): at 18:31

## 2017-10-30 RX ADMIN — PANTOPRAZOLE SODIUM 40 MILLIGRAM(S): 20 TABLET, DELAYED RELEASE ORAL at 05:35

## 2017-10-30 RX ADMIN — Medication 1 DROP(S): at 05:33

## 2017-10-30 RX ADMIN — Medication 1: at 22:54

## 2017-10-30 RX ADMIN — Medication 1 APPLICATION(S): at 05:33

## 2017-10-30 RX ADMIN — CLOTRIMAZOLE AND BETAMETHASONE DIPROPIONATE 1 APPLICATION(S): 10; .5 CREAM TOPICAL at 18:32

## 2017-10-30 NOTE — PROGRESS NOTE ADULT - PROBLEM SELECTOR PLAN 9
pt currently in sinus rhythm  monitor for episodes of AF on tele  INR subtherapeutic again today, will dose Coumadin/ F/up INR tmrw In AM pt currently in sinus rhythm  monitor for episodes of AF on tele  INR therapeutic today, will dose Coumadin/ F/up INR tmrw In AM

## 2017-10-30 NOTE — PROGRESS NOTE ADULT - PROBLEM SELECTOR PLAN 7
"    Preventive Care at the 18 Month Visit  Growth Measurements & Percentiles  Head Circumference: 18\" (45.7 cm) (23 %, Source: WHO (Girls, 0-2 years)) 23 %ile based on WHO (Girls, 0-2 years) head circumference-for-age data using vitals from 4/28/2017.   Weight: 27 lbs 13 oz / 12.6 kg (actual weight) / 88 %ile based on WHO (Girls, 0-2 years) weight-for-age data using vitals from 4/28/2017.   Length: 2' 10\" / 86.4 cm 80 %ile based on WHO (Girls, 0-2 years) length-for-age data using vitals from 4/28/2017.   Weight for length: 83 %ile based on WHO (Girls, 0-2 years) weight-for-recumbent length data using vitals from 4/28/2017.    Your toddler s next Preventive Check-up will be at 4 years of age    Development  At this age, most children will:    Walk fast, run stiffly, walk backwards and walk up stairs with one hand held.    Sit in a small chair and climb into an adult chair.    Kick and throw a ball.    Stack three or four blocks and put rings on a cone.    Turn single pages in a book or magazine, look at pictures and name some objects    Speak four to 10 words, combine two-word phrases, understand and follow simple directions, and point to a body part when asked.    Imitate a crayon stroke on paper.    Feed herself, use a spoon and hold and drink from a sippy cup fairly well.    Use a household toy (like a toy telephone) well.    Feeding Tips    Your toddler's food likes and dislikes may change.  Do not make mealtimes a jackman.  Your toddler may be stubborn, but she often copies your eating habits.  This is not done on purpose.  Give your toddler a good example and eat healthy every day.    Offer your toddler a variety of foods.    The amount of food your toddler should eat should average one  good  meal each day.    To see if your toddler has a healthy diet, look at a four or five day span to see if she is eating a good balance of foods from the food groups.    Your toddler may have an interest in sweets.  Try to " offer nutritional, naturally sweet foods such as fruit or dried fruits.  Offer sweets no more than once each day.  Avoid offering sweets as a reward for completing a meal.    Teach your toddler to wash his or her hands and face often.  This is important before eating and drinking.    Toilet Training    Your toddler may show interest in potty training.  Signs she may be ready include dry naps, use of words like  pee pee,   wee wee  or  poo,  grunting and straining after meals, wanting to be changed when they are dirty, realizing the need to go, going to the potty alone and undressing.  For most children, this interest in toilet training happens between the ages of 2 and 3.    Sleep    Most children this age take one nap a day.  If your toddler does not nap, you may want to start a  quiet time.     Your toddler may have night fears.  Using a night light or opening the bedroom door may help calm fears.    Choose calm activities before bedtime.    Continue your regular nighttime routine: bath, brushing teeth and reading.    Safety    Use an approved toddler car seat every time your child rides in the car.  Make sure to install it in the back seat.  Your toddler should remain rear-facing until 2 years of age.    Protect your toddler from falls, burns, drowning, choking and other accidents.    Keep all medicines, cleaning supplies and poisons out of your toddler s reach. Call the poison control center or your health care provider for directions in case your toddler swallows poison.    Put the poison control number on all phones:  1-793.363.6573.    Use sunscreen with a SPF of more than 15 when your toddler is outside.    Never leave your child alone in the bathtub or near water.    Do not leave your child alone in the car, even if he or she is asleep.    What Your Toddler Needs    Your toddler may become stubborn and possessive.  Do not expect him or her to share toys with other children.  Give your toddler strong toys  that can pull apart, be put together or be used to build.  Stay away from toys with small or sharp parts.    Your toddler may become interested in what s in drawers, cabinets and wastebaskets.  If possible, let her look through (unload and re-load) some drawers or cupboards.    Make sure your toddler is getting consistent discipline at home and at day care. Talk with your  provider if this isn t the case.    Praise your toddler for positive, appropriate behavior.  Your toddler does not understand danger or remember the word  no.     Read to your toddler often.    Dental Care    Brush your toddler s teeth one to two times each day with a soft-bristled toothbrush.    Use a small amount (smaller than pea size) of fluoridated toothpaste once daily.    Let your toddler play with the toothbrush after brushing    Your pediatric provider will speak with you regarding the need for regular dental appointments for cleanings and check-ups starting when your child s first tooth appears. (Your child may need fluoride supplements if you have well water.)           Restart flomax

## 2017-10-30 NOTE — PROGRESS NOTE ADULT - PROBLEM SELECTOR PLAN 1
Prelim blood cultures with bacillus/coag negative staph likely contaminants- patient with hypotension @ rehab HD/ has been stable in the hospital.   - repeat BCx s/p 24 hours off abx growing gram + cocci in clusters in aerobic/anaerobic bottles/ COANS  - CT A/P negative, CXR negative  - antibiotics discontinued on 10/23 but Vancomycin restarted d/t GPC in blood  - as per ID, IV vanco 500 mg post HD X 7 days, repeat surveillance cultures after vanco finishes/ as discussed with ID / Dr Ruelas (10/29 is day 3) Prelim blood cultures with bacillus/coag negative staph likely contaminants- patient with hypotension @ rehab HD/ has been stable in the hospital.   - repeat BCx s/p 24 hours off abx growing gram + cocci in clusters in aerobic/anaerobic bottles/ COANS  - CT A/P negative, CXR negative  - antibiotics discontinued on 10/23 but Vancomycin restarted d/t GPC in blood  - as per ID, IV vanco 500 mg post HD X 7 days, repeat surveillance cultures after vanco finishes/ as discussed with ID / Dr Ruelas (10/30 is day 4)

## 2017-10-30 NOTE — PROGRESS NOTE ADULT - ASSESSMENT
71 yo with DM,ESRD recent HD,CVA ,non verbal.Recent bacteremia,pyelo s/p antimicrobia   hypotension during HD    Blood Cx-CNS and Bacillus-Initial Cx negative  But 10/24 blood Cx again with CNS    CT abd pelvis and CXR negative  Clinically stable  No fevers  ?Line related CNS BSI v contamination  Will recommend continue IV vanco 500 mg post HD X 5 more  days   Repeat surveillance cultures after vanco finishes  If still positive may need HD catheter removal  Other plan per primary team  Will Follow.  Beeper [id number]/afterhours/No response-[phone]

## 2017-10-30 NOTE — PROGRESS NOTE ADULT - SUBJECTIVE AND OBJECTIVE BOX
TEAM 4 Medicine Intern: Oscar Lorenzo  Spectralink: [phone]  Pager: [phone]    HPI/INTERVAL HISTORY: Overnight, no acute events. VSS. Patient seen and examined at bedside this AM. Patient nonverbal at baseline.    OBJECTIVE:  VITAL SIGNS:  ICU Vital Signs Last 24 Hrs  T(C): 36.7 (30 Oct 2017 04:25), Max: 36.8 (29 Oct 2017 21:28)  T(F): 98 (30 Oct 2017 04:25), Max: 98.3 (29 Oct 2017 21:28)  HR: 82 (30 Oct 2017 04:25) (80 - 86)  BP: 117/71 (30 Oct 2017 04:25) (107/78 - 130/81)  BP(mean): --  ABP: --  ABP(mean): --  RR: 18 (30 Oct 2017 04:25) (17 - 18)  SpO2: 97% (30 Oct 2017 04:25) (97% - 102%)        10-29 @ 07:01  -  10-30 @ 07:00  --------------------------------------------------------  IN: 540 mL / OUT: 0 mL / NET: 540 mL      CAPILLARY BLOOD GLUCOSE      POCT Blood Glucose.: 115 mg/dL (30 Oct 2017 10:18)      PHYSICAL EXAM:  GENERAL: NAD, well-developed, sleeping comfortably in bed  HEAD:  Atraumatic, Normocephalic  EYES: conjunctiva and sclera clear  NECK: Supple, No JVD  CHEST/LUNG: Clear to auscultation bilaterally; No wheeze  HEART: Regular rate and rhythm; holosytolic murmur, rubs, or gallops  ABDOMEN: Soft, Nontender, Nondistended; Bowel sounds present  EXTREMITIES:  2+ Peripheral Pulses, No clubbing, cyanosis, or edema  PSYCH: awake and alert, not following commands  SKIN: sacral decubitus stage 3 ulcer present, permacath site in R anterior chest wall in place, small blister on lateral edge of R thumb     LABS:                        9.7    6.7   )-----------( 286      ( 30 Oct 2017 06:50 )             29.5     10-30    141  |  100  |  51<H>  ----------------------------<  97  3.7   |  24  |  5.52<H>    Ca    9.0      30 Oct 2017 06:50  Phos  2.7     10-30  Mg     2.2     10-30        PT/INR - ( 30 Oct 2017 06:50 )   PT: 24.6 sec;   INR: 2.24 ratio         PTT - ( 30 Oct 2017 06:50 )  PTT:36.4 sec    RADIOLOGY & ADDITIONAL TESTS: Reviewed.    ALBUTerol    90 MICROgram(s) HFA Inhaler 2 Puff(s) Inhalation every 6 hours PRN  amiodarone    Tablet 200 milliGRAM(s) Oral daily  artificial  tears Solution 1 Drop(s) Both EYES four times a day  aspirin enteric coated 81 milliGRAM(s) Oral daily  atorvastatin 40 milliGRAM(s) Oral at bedtime  BACItracin   Ointment 1 Application(s) Topical two times a day  busPIRone 5 milliGRAM(s) Oral two times a day  clopidogrel Tablet 75 milliGRAM(s) Oral daily  clotrimazole/betamethasone Cream 1 Application(s) Topical two times a day  dextrose 5%. 1000 milliLiter(s) IV Continuous <Continuous>  dextrose 50% Injectable 12.5 Gram(s) IV Push once  dextrose 50% Injectable 25 Gram(s) IV Push once  dextrose 50% Injectable 25 Gram(s) IV Push once  dextrose Gel 1 Dose(s) Oral once PRN  epoetin georgie Injectable 81428 Unit(s) IV Push <User Schedule>  fluticasone propionate   110 MICROgram(s) HFA Inhaler 1 Puff(s) Inhalation daily  glucagon  Injectable 1 milliGRAM(s) IntraMuscular once PRN  insulin lispro (HumaLOG) corrective regimen sliding scale   SubCutaneous Before meals and at bedtime  Nephro-sophie 1 Tablet(s) Oral daily  pantoprazole    Tablet 40 milliGRAM(s) Oral before breakfast  sevelamer carbonate Powder 1600 milliGRAM(s) Oral three times a day with meals  tamsulosin 0.4 milliGRAM(s) Oral at bedtime      No Known Allergies

## 2017-10-30 NOTE — PROGRESS NOTE ADULT - SUBJECTIVE AND OBJECTIVE BOX
Patient is a 72y old  Male who presents with a chief complaint of s/p cardiac cath (24 Oct 2017 15:51)    Being followed by ID for bacteremia    Interval history:  No acute events      ROS:  Not obtainable  minimal verbal response  Antimicrobials:        Vital Signs Last 24 Hrs  T(C): 36.7 (10-30-17 @ 04:25), Max: 36.8 (10-29-17 @ 21:28)  T(F): 98 (10-30-17 @ 04:25), Max: 98.3 (10-29-17 @ 21:28)  HR: 82 (10-30-17 @ 04:25) (80 - 86)  BP: 117/71 (10-30-17 @ 04:25) (107/78 - 130/81)  BP(mean): --  RR: 18 (10-30-17 @ 04:25) (17 - 18)  SpO2: 97% (10-30-17 @ 04:25) (97% - 102%)    Physical Exam:    Constitutional well preserved,comfortable,pleasant    HEENT PERRLA EOMI,No pallor or icterus    No oral exudate or erythema    HD actheter no erythema or tenderness    Neck supple no JVD or LN    Chest Good AE,CTA    CVS RRR S1 S2 WNl No murmur or rub or gallop    Abd soft BS normal No tenderness no masses    Ext No cyanosis clubbing or edema    IV site no erythema tenderness or discharge    Joints no swelling or LOM    CNS minimal response to stimuli    Lab Data:                          9.7    6.7   )-----------( 286      ( 30 Oct 2017 06:50 )             29.5       10-30    141  |  100  |  51<H>  ----------------------------<  97  3.7   |  24  |  5.52<H>    Ca    9.0      30 Oct 2017 06:50  Phos  2.7     10-30  Mg     2.2     10-30

## 2017-10-30 NOTE — PROGRESS NOTE ADULT - SUBJECTIVE AND OBJECTIVE BOX
Resting, non-verbal    Vital Signs Last 24 Hrs  T(C): 36.7 (10-30-17 @ 04:25), Max: 36.8 (10-29-17 @ 21:28)  T(F): 98 (10-30-17 @ 04:25), Max: 98.3 (10-29-17 @ 21:28)  HR: 82 (10-30-17 @ 04:25) (80 - 86)  BP: 117/71 (10-30-17 @ 04:25) (107/78 - 130/81)  BP(mean): --  RR: 18 (10-30-17 @ 04:25) (17 - 18)  SpO2: 97% (10-30-17 @ 04:25) (97% - 102%)    Respiratory: b/l air entry, clear  Cardiovascular: S1 S2 regular  Gastrointestinal: soft, NT, BS present  Extremities: no edema     ALBUTerol    90 MICROgram(s) HFA Inhaler 2 Puff(s) Inhalation every 6 hours PRN  amiodarone    Tablet 200 milliGRAM(s) Oral daily  artificial  tears Solution 1 Drop(s) Both EYES four times a day  aspirin enteric coated 81 milliGRAM(s) Oral daily  atorvastatin 40 milliGRAM(s) Oral at bedtime  BACItracin   Ointment 1 Application(s) Topical two times a day  busPIRone 5 milliGRAM(s) Oral two times a day  clopidogrel Tablet 75 milliGRAM(s) Oral daily  clotrimazole/betamethasone Cream 1 Application(s) Topical two times a day  dextrose 5%. 1000 milliLiter(s) IV Continuous <Continuous>  dextrose 50% Injectable 12.5 Gram(s) IV Push once  dextrose 50% Injectable 25 Gram(s) IV Push once  dextrose 50% Injectable 25 Gram(s) IV Push once  dextrose Gel 1 Dose(s) Oral once PRN  epoetin georgie Injectable 25837 Unit(s) IV Push <User Schedule>  fluticasone propionate   110 MICROgram(s) HFA Inhaler 1 Puff(s) Inhalation daily  glucagon  Injectable 1 milliGRAM(s) IntraMuscular once PRN  insulin lispro (HumaLOG) corrective regimen sliding scale   SubCutaneous Before meals and at bedtime  Nephro-sophie 1 Tablet(s) Oral daily  pantoprazole    Tablet 40 milliGRAM(s) Oral before breakfast  tamsulosin 0.4 milliGRAM(s) Oral at bedtime  warfarin 3 milliGRAM(s) Oral once                        9.7    6.7   )-----------( 286      ( 30 Oct 2017 06:50 )             29.5     30 Oct 2017 06:50    141    |  100    |  51     ----------------------------<  97     3.7     |  24     |  5.52     Ca    9.0        30 Oct 2017 06:50  Phos  2.7       30 Oct 2017 06:50  Mg     2.2       30 Oct 2017 06:50      Assessment and Recommendation:     ESRD on HD  HD TTS, TMP as able  Procrit w/HD  HD cath site clean, no erythema, no d/c  Adm w/bacteremia, initially thought to be contam, but repeat bld cx w/CNS again  On Vanco 3 x week per ID  If f/u blood cx still positive, would favor d/c perm Cath

## 2017-10-30 NOTE — PROGRESS NOTE ADULT - ATTENDING COMMENTS
Agree with R1/R2 plan of care  - Afebrile, not in distress. Reviewed labs, c/s and imaging  - ID, renal plan noted  - c/w current med  - d/c planning in progress

## 2017-10-31 LAB
ANION GAP SERPL CALC-SCNC: 18 MMOL/L — HIGH (ref 5–17)
APTT BLD: 36.1 SEC — SIGNIFICANT CHANGE UP (ref 27.5–37.4)
BUN SERPL-MCNC: 63 MG/DL — HIGH (ref 7–23)
CALCIUM SERPL-MCNC: 9.6 MG/DL — SIGNIFICANT CHANGE UP (ref 8.4–10.5)
CHLORIDE SERPL-SCNC: 99 MMOL/L — SIGNIFICANT CHANGE UP (ref 96–108)
CO2 SERPL-SCNC: 25 MMOL/L — SIGNIFICANT CHANGE UP (ref 22–31)
CREAT SERPL-MCNC: 6.49 MG/DL — HIGH (ref 0.5–1.3)
GLUCOSE BLDC GLUCOMTR-MCNC: 127 MG/DL — HIGH (ref 70–99)
GLUCOSE BLDC GLUCOMTR-MCNC: 131 MG/DL — HIGH (ref 70–99)
GLUCOSE BLDC GLUCOMTR-MCNC: 145 MG/DL — HIGH (ref 70–99)
GLUCOSE BLDC GLUCOMTR-MCNC: 160 MG/DL — HIGH (ref 70–99)
GLUCOSE BLDC GLUCOMTR-MCNC: 199 MG/DL — HIGH (ref 70–99)
GLUCOSE SERPL-MCNC: 113 MG/DL — HIGH (ref 70–99)
HCT VFR BLD CALC: 31.2 % — LOW (ref 39–50)
HGB BLD-MCNC: 10.5 G/DL — LOW (ref 13–17)
INR BLD: 1.96 RATIO — HIGH (ref 0.88–1.16)
MAGNESIUM SERPL-MCNC: 2.4 MG/DL — SIGNIFICANT CHANGE UP (ref 1.6–2.6)
MCHC RBC-ENTMCNC: 30.5 PG — SIGNIFICANT CHANGE UP (ref 27–34)
MCHC RBC-ENTMCNC: 33.7 GM/DL — SIGNIFICANT CHANGE UP (ref 32–36)
MCV RBC AUTO: 90.3 FL — SIGNIFICANT CHANGE UP (ref 80–100)
PHOSPHATE SERPL-MCNC: 3.1 MG/DL — SIGNIFICANT CHANGE UP (ref 2.5–4.5)
PLATELET # BLD AUTO: 327 K/UL — SIGNIFICANT CHANGE UP (ref 150–400)
POTASSIUM SERPL-MCNC: 4.1 MMOL/L — SIGNIFICANT CHANGE UP (ref 3.5–5.3)
POTASSIUM SERPL-SCNC: 4.1 MMOL/L — SIGNIFICANT CHANGE UP (ref 3.5–5.3)
PROTHROM AB SERPL-ACNC: 21.7 SEC — HIGH (ref 9.8–12.7)
RBC # BLD: 3.46 M/UL — LOW (ref 4.2–5.8)
RBC # FLD: 14.2 % — SIGNIFICANT CHANGE UP (ref 10.3–14.5)
SODIUM SERPL-SCNC: 142 MMOL/L — SIGNIFICANT CHANGE UP (ref 135–145)
WBC # BLD: 7.2 K/UL — SIGNIFICANT CHANGE UP (ref 3.8–10.5)
WBC # FLD AUTO: 7.2 K/UL — SIGNIFICANT CHANGE UP (ref 3.8–10.5)

## 2017-10-31 PROCEDURE — 99233 SBSQ HOSP IP/OBS HIGH 50: CPT

## 2017-10-31 PROCEDURE — 99232 SBSQ HOSP IP/OBS MODERATE 35: CPT

## 2017-10-31 RX ORDER — VANCOMYCIN HCL 1 G
500 VIAL (EA) INTRAVENOUS ONCE
Qty: 0 | Refills: 0 | Status: COMPLETED | OUTPATIENT
Start: 2017-10-31 | End: 2017-10-31

## 2017-10-31 RX ORDER — WARFARIN SODIUM 2.5 MG/1
3 TABLET ORAL ONCE
Qty: 0 | Refills: 0 | Status: COMPLETED | OUTPATIENT
Start: 2017-10-31 | End: 2017-10-31

## 2017-10-31 RX ADMIN — Medication 1 DROP(S): at 05:34

## 2017-10-31 RX ADMIN — Medication: at 08:33

## 2017-10-31 RX ADMIN — Medication 1 DROP(S): at 11:24

## 2017-10-31 RX ADMIN — AMIODARONE HYDROCHLORIDE 200 MILLIGRAM(S): 400 TABLET ORAL at 05:34

## 2017-10-31 RX ADMIN — Medication: at 14:34

## 2017-10-31 RX ADMIN — ERYTHROPOIETIN 10000 UNIT(S): 10000 INJECTION, SOLUTION INTRAVENOUS; SUBCUTANEOUS at 10:54

## 2017-10-31 RX ADMIN — Medication 1 DROP(S): at 18:22

## 2017-10-31 RX ADMIN — Medication 100 MILLIGRAM(S): at 19:36

## 2017-10-31 RX ADMIN — Medication 5 MILLIGRAM(S): at 19:23

## 2017-10-31 RX ADMIN — ATORVASTATIN CALCIUM 40 MILLIGRAM(S): 80 TABLET, FILM COATED ORAL at 23:18

## 2017-10-31 RX ADMIN — PANTOPRAZOLE SODIUM 40 MILLIGRAM(S): 20 TABLET, DELAYED RELEASE ORAL at 05:34

## 2017-10-31 RX ADMIN — CLOTRIMAZOLE AND BETAMETHASONE DIPROPIONATE 1 APPLICATION(S): 10; .5 CREAM TOPICAL at 19:23

## 2017-10-31 RX ADMIN — Medication 81 MILLIGRAM(S): at 14:24

## 2017-10-31 RX ADMIN — CLOPIDOGREL BISULFATE 75 MILLIGRAM(S): 75 TABLET, FILM COATED ORAL at 14:24

## 2017-10-31 RX ADMIN — Medication 1 DROP(S): at 23:19

## 2017-10-31 RX ADMIN — Medication 1 TABLET(S): at 14:24

## 2017-10-31 RX ADMIN — TAMSULOSIN HYDROCHLORIDE 0.4 MILLIGRAM(S): 0.4 CAPSULE ORAL at 23:17

## 2017-10-31 RX ADMIN — Medication 5 MILLIGRAM(S): at 05:34

## 2017-10-31 RX ADMIN — Medication 1 PUFF(S): at 19:23

## 2017-10-31 RX ADMIN — CLOTRIMAZOLE AND BETAMETHASONE DIPROPIONATE 1 APPLICATION(S): 10; .5 CREAM TOPICAL at 05:35

## 2017-10-31 RX ADMIN — Medication 1 APPLICATION(S): at 05:34

## 2017-10-31 RX ADMIN — WARFARIN SODIUM 3 MILLIGRAM(S): 2.5 TABLET ORAL at 23:23

## 2017-10-31 RX ADMIN — Medication 1 APPLICATION(S): at 18:22

## 2017-10-31 NOTE — PROGRESS NOTE ADULT - SUBJECTIVE AND OBJECTIVE BOX
Patient is a 72y old  Male who presents with a chief complaint of s/p cardiac cath (24 Oct 2017 15:51)    Being followed by ID for bacteremia    Interval history:Undergoing HD  no verbal response  No acute events      ROS:  No cough,SOB,CP  No N/V/D./abd pain  No other complaints      Antimicrobials:Vanco post HD        Vital Signs Last 24 Hrs  T(C): 37 (10-31-17 @ 08:30), Max: 37 (10-31-17 @ 08:30)  T(F): 98.6 (10-31-17 @ 08:30), Max: 98.6 (10-31-17 @ 08:30)  HR: 78 (10-31-17 @ 08:30) (77 - 81)  BP: 133/71 (10-31-17 @ 08:30) (118/70 - 133/71)  BP(mean): --  RR: 18 (10-31-17 @ 08:30) (16 - 18)  SpO2: 100% (10-31-17 @ 08:30) (96% - 100%)    Physical Exam:    Constitutional well preserved,comfortable,pleasant    HEENT PERRLA EOMI,No pallor or icterus    No oral exudate or erythema    Neck supple no JVD or LN    R SC HD catheter no erythema or tenderness    Chest Good AE,CTA    CVS RRR S1 S2 WNl No murmur or rub or gallop    Abd soft BS normal No tenderness no masses    Ext No cyanosis clubbing or edema    IV site no erythema tenderness or discharge    Joints no swelling or LOM    CNS non verbal     Lab Data:                          10.5   7.2   )-----------( 327      ( 31 Oct 2017 06:42 )             31.2       10-31    142  |  99  |  63<H>  ----------------------------<  113<H>  4.1   |  25  |  6.49<H>    Ca    9.6      31 Oct 2017 06:42  Phos  3.1     10-31  Mg     2.4     10-31

## 2017-10-31 NOTE — PROGRESS NOTE ADULT - ATTENDING COMMENTS
Seen, examined and agree with R1/R2 resident plan of care  - Afebrile, no distress, or SOB  - ID plan noted, IV vanco 500mg 5 more days post HD, will f/u repeat blood c/s done yesterday  - Will speak to Dr. Solis (Renal) to see if AVF can be arranged as Rehab requires it.  HD per renal.  - c/w other meds

## 2017-10-31 NOTE — PROGRESS NOTE ADULT - PROBLEM SELECTOR PLAN 1
Prelim blood cultures with bacillus/coag negative staph likely contaminants- patient with hypotension @ rehab HD/ has been stable in the hospital.   - repeat BCx s/p 24 hours off abx growing gram + cocci in clusters in aerobic/anaerobic bottles/ COANS  - CT A/P negative, CXR negative  - antibiotics discontinued on 10/23 but Vancomycin restarted d/t GPC in blood  - as per ID, IV vanco 500 mg post HD X 7 days, repeat surveillance cultures after vanco finishes/ as discussed with ID / Dr Ruelas (10/30 is day 4) blood cultures with bacillus/coag negative staph likely contaminants- patient with hypotension @ rehab HD/ has been stable in the hospital.   - repeat BCx s/p 24 hours off abx growing gram + cocci in clusters in aerobic/anaerobic bottles/ COANS  - CT A/P negative, CXR negative  - antibiotics discontinued on 10/23 but Vancomycin restarted d/t GPC in blood  - as per ID, IV vanco 500 mg post HD X 7 days, repeat surveillance cultures after vanco finishes/ as discussed with ID / Dr Ruelas (10/31 is day 5)

## 2017-10-31 NOTE — PROGRESS NOTE ADULT - ASSESSMENT
71 yo with DM,ESRD recent HD,CVA ,non verbal.Recent bacteremia,pyelo s/p antimicrobia   hypotension during HD    Blood Cx-CNS and Bacillus-Initial Cx negative  But 10/24 blood Cx again with CNS      Clinically stable  No fevers  ?Line related CNS BSI v contamination  Will recommend continue IV vanco 500 mg post HD X 5 more  days   Repeat surveillance cultures after vanco finishes  If still positive may need HD catheter removal  Other plan per primary team  Will Follow.  Beeper [id number]-obhb/afterhours/No response-[phone]

## 2017-10-31 NOTE — PROGRESS NOTE ADULT - SUBJECTIVE AND OBJECTIVE BOX
TE[employer] 4 Medicine Intern: Oscar Lorenzo  Spectralink: [phone]  Pager: [phone]    HPI/INTERVAL HISTORY: Overnight, no acute events. VSS. Patient seen and examined at bedside this AM. Patient nonverbal at baseline.    OBJECTIVE:  VITAL SIGNS:  ICU Vital Signs Last 24 Hrs  T(C): 36.4 (31 Oct 2017 04:42), Max: 36.7 (30 Oct 2017 14:20)  T(F): 97.5 (31 Oct 2017 04:42), Max: 98.1 (30 Oct 2017 14:20)  HR: 77 (31 Oct 2017 04:42) (77 - 81)  BP: 131/77 (31 Oct 2017 04:42) (118/70 - 131/77)  BP(mean): --  ABP: --  ABP(mean): --  RR: 17 (31 Oct 2017 04:42) (16 - 17)  SpO2: 100% (31 Oct 2017 04:42) (96% - 100%)        10-30 @ 07:01  -  10-31 @ 07:00  --------------------------------------------------------  IN: 480 mL / OUT: 0 mL / NET: 480 mL      CAPILLARY BLOOD GLUCOSE      POCT Blood Glucose.: 127 mg/dL (31 Oct 2017 08:23)      PHYSICAL EXAM:  GENERAL: NAD, well-developed, sleeping comfortably in bed  HEAD:  Atraumatic, Normocephalic  EYES: conjunctiva and sclera clear  NECK: Supple, No JVD  CHEST/LUNG: Clear to auscultation bilaterally; No wheeze  HEART: Regular rate and rhythm; holosytolic murmur, rubs, or gallops  ABDOMEN: Soft, Nontender, Nondistended; Bowel sounds present  EXTREMITIES:  2+ Peripheral Pulses, No clubbing, cyanosis, or edema  PSYCH: awake and alert, not following commands  SKIN: sacral decubitus stage 3 ulcer present, permacath site in R anterior chest wall in place, small blister on lateral edge of R thumb     LABS:                        10.5   7.2   )-----------( 327      ( 31 Oct 2017 06:42 )             31.2     10-31    142  |  99  |  63<H>  ----------------------------<  113<H>  4.1   |  25  |  6.49<H>    Ca    9.6      31 Oct 2017 06:42  Phos  3.1     10-31  Mg     2.4     10-31        PT/INR - ( 31 Oct 2017 06:42 )   PT: 21.7 sec;   INR: 1.96 ratio         PTT - ( 31 Oct 2017 06:42 )  PTT:36.1 sec          RADIOLOGY & ADDITIONAL TESTS: Reviewed.    ALBUTerol    90 MICROgram(s) HFA Inhaler 2 Puff(s) Inhalation every 6 hours PRN  amiodarone    Tablet 200 milliGRAM(s) Oral daily  artificial  tears Solution 1 Drop(s) Both EYES four times a day  aspirin enteric coated 81 milliGRAM(s) Oral daily  atorvastatin 40 milliGRAM(s) Oral at bedtime  BACItracin   Ointment 1 Application(s) Topical two times a day  busPIRone 5 milliGRAM(s) Oral two times a day  clopidogrel Tablet 75 milliGRAM(s) Oral daily  clotrimazole/betamethasone Cream 1 Application(s) Topical two times a day  dextrose 5%. 1000 milliLiter(s) IV Continuous <Continuous>  dextrose 50% Injectable 12.5 Gram(s) IV Push once  dextrose 50% Injectable 25 Gram(s) IV Push once  dextrose 50% Injectable 25 Gram(s) IV Push once  dextrose Gel 1 Dose(s) Oral once PRN  epoetin georgie Injectable 71568 Unit(s) IV Push <User Schedule>  fluticasone propionate   110 MICROgram(s) HFA Inhaler 1 Puff(s) Inhalation daily  glucagon  Injectable 1 milliGRAM(s) IntraMuscular once PRN  insulin lispro (HumaLOG) corrective regimen sliding scale   SubCutaneous Before meals and at bedtime  Nephro-sophie 1 Tablet(s) Oral daily  pantoprazole    Tablet 40 milliGRAM(s) Oral before breakfast  tamsulosin 0.4 milliGRAM(s) Oral at bedtime  warfarin 3 milliGRAM(s) Oral once      No Known Allergies

## 2017-11-01 DIAGNOSIS — I66.9 OCCLUSION AND STENOSIS OF UNSPECIFIED CEREBRAL ARTERY: ICD-10-CM

## 2017-11-01 DIAGNOSIS — R78.81 BACTEREMIA: ICD-10-CM

## 2017-11-01 LAB
ANION GAP SERPL CALC-SCNC: 15 MMOL/L — SIGNIFICANT CHANGE UP (ref 5–17)
APTT BLD: 34.8 SEC — SIGNIFICANT CHANGE UP (ref 27.5–37.4)
BUN SERPL-MCNC: 37 MG/DL — HIGH (ref 7–23)
CALCIUM SERPL-MCNC: 9 MG/DL — SIGNIFICANT CHANGE UP (ref 8.4–10.5)
CHLORIDE SERPL-SCNC: 96 MMOL/L — SIGNIFICANT CHANGE UP (ref 96–108)
CO2 SERPL-SCNC: 26 MMOL/L — SIGNIFICANT CHANGE UP (ref 22–31)
CREAT SERPL-MCNC: 3.79 MG/DL — HIGH (ref 0.5–1.3)
GLUCOSE BLDC GLUCOMTR-MCNC: 114 MG/DL — HIGH (ref 70–99)
GLUCOSE BLDC GLUCOMTR-MCNC: 130 MG/DL — HIGH (ref 70–99)
GLUCOSE BLDC GLUCOMTR-MCNC: 157 MG/DL — HIGH (ref 70–99)
GLUCOSE BLDC GLUCOMTR-MCNC: 159 MG/DL — HIGH (ref 70–99)
GLUCOSE SERPL-MCNC: 120 MG/DL — HIGH (ref 70–99)
HCT VFR BLD CALC: 34.9 % — LOW (ref 39–50)
HGB BLD-MCNC: 11 G/DL — LOW (ref 13–17)
INR BLD: 1.79 RATIO — HIGH (ref 0.88–1.16)
MCHC RBC-ENTMCNC: 28.9 PG — SIGNIFICANT CHANGE UP (ref 27–34)
MCHC RBC-ENTMCNC: 31.7 GM/DL — LOW (ref 32–36)
MCV RBC AUTO: 91.2 FL — SIGNIFICANT CHANGE UP (ref 80–100)
PLATELET # BLD AUTO: 298 K/UL — SIGNIFICANT CHANGE UP (ref 150–400)
POTASSIUM SERPL-MCNC: 4.3 MMOL/L — SIGNIFICANT CHANGE UP (ref 3.5–5.3)
POTASSIUM SERPL-SCNC: 4.3 MMOL/L — SIGNIFICANT CHANGE UP (ref 3.5–5.3)
PROTHROM AB SERPL-ACNC: 19.6 SEC — HIGH (ref 9.8–12.7)
RBC # BLD: 3.82 M/UL — LOW (ref 4.2–5.8)
RBC # FLD: 14.7 % — HIGH (ref 10.3–14.5)
SODIUM SERPL-SCNC: 137 MMOL/L — SIGNIFICANT CHANGE UP (ref 135–145)
WBC # BLD: 8.1 K/UL — SIGNIFICANT CHANGE UP (ref 3.8–10.5)
WBC # FLD AUTO: 8.1 K/UL — SIGNIFICANT CHANGE UP (ref 3.8–10.5)

## 2017-11-01 PROCEDURE — 99232 SBSQ HOSP IP/OBS MODERATE 35: CPT

## 2017-11-01 PROCEDURE — 99233 SBSQ HOSP IP/OBS HIGH 50: CPT | Mod: GC

## 2017-11-01 RX ORDER — WARFARIN SODIUM 2.5 MG/1
5 TABLET ORAL ONCE
Qty: 0 | Refills: 0 | Status: COMPLETED | OUTPATIENT
Start: 2017-11-01 | End: 2017-11-01

## 2017-11-01 RX ADMIN — CLOTRIMAZOLE AND BETAMETHASONE DIPROPIONATE 1 APPLICATION(S): 10; .5 CREAM TOPICAL at 06:56

## 2017-11-01 RX ADMIN — Medication 1 PUFF(S): at 16:28

## 2017-11-01 RX ADMIN — Medication 5 MILLIGRAM(S): at 16:30

## 2017-11-01 RX ADMIN — WARFARIN SODIUM 5 MILLIGRAM(S): 2.5 TABLET ORAL at 21:06

## 2017-11-01 RX ADMIN — AMIODARONE HYDROCHLORIDE 200 MILLIGRAM(S): 400 TABLET ORAL at 06:55

## 2017-11-01 RX ADMIN — Medication 81 MILLIGRAM(S): at 12:21

## 2017-11-01 RX ADMIN — CLOPIDOGREL BISULFATE 75 MILLIGRAM(S): 75 TABLET, FILM COATED ORAL at 12:21

## 2017-11-01 RX ADMIN — Medication 1 DROP(S): at 16:29

## 2017-11-01 RX ADMIN — Medication 1 DROP(S): at 06:54

## 2017-11-01 RX ADMIN — Medication: at 21:33

## 2017-11-01 RX ADMIN — ATORVASTATIN CALCIUM 40 MILLIGRAM(S): 80 TABLET, FILM COATED ORAL at 21:07

## 2017-11-01 RX ADMIN — CLOTRIMAZOLE AND BETAMETHASONE DIPROPIONATE 1 APPLICATION(S): 10; .5 CREAM TOPICAL at 16:27

## 2017-11-01 RX ADMIN — Medication 1 APPLICATION(S): at 06:55

## 2017-11-01 RX ADMIN — Medication 1 DROP(S): at 12:20

## 2017-11-01 RX ADMIN — Medication 1 TABLET(S): at 12:21

## 2017-11-01 RX ADMIN — Medication 1: at 13:50

## 2017-11-01 RX ADMIN — Medication 5 MILLIGRAM(S): at 06:55

## 2017-11-01 RX ADMIN — Medication 1 DROP(S): at 23:34

## 2017-11-01 RX ADMIN — Medication 1 APPLICATION(S): at 16:28

## 2017-11-01 RX ADMIN — TAMSULOSIN HYDROCHLORIDE 0.4 MILLIGRAM(S): 0.4 CAPSULE ORAL at 21:08

## 2017-11-01 NOTE — PROGRESS NOTE ADULT - PROBLEM SELECTOR PROBLEM 7
Benign prostatic hyperplasia with lower urinary tract symptoms, symptom details unspecified Dysphagia, unspecified type

## 2017-11-01 NOTE — PROGRESS NOTE ADULT - PROBLEM SELECTOR PLAN 2
- unknown etiology, diffuse maculopapular rash now resolved ESRD on HD, consult called Dr. Elif Solis - HD T, Thu, Saturday [facility] [employer]. Spoke to Samantha-Dtr thtt AVF is necessary for placement in HD centers. She agreed to ask Vascular Surgery, once blood c/s is negative

## 2017-11-01 NOTE — PROGRESS NOTE ADULT - SUBJECTIVE AND OBJECTIVE BOX
Patient is a 72y old  Male who presents with a chief complaint of s/p cardiac cath (24 Oct 2017 15:51)    Being followed by ID for bactyeremia    Interval history:  No acute events      ROS:  No ROS obtaqinable as non verbal      Antimicrobials:    vanco post HD    Vital Signs Last 24 Hrs  T(C): 36.7 (11-01-17 @ 10:19), Max: 36.8 (10-31-17 @ 21:18)  T(F): 98 (11-01-17 @ 10:19), Max: 98.2 (10-31-17 @ 21:18)  HR: 83 (11-01-17 @ 10:19) (79 - 90)  BP: 118/72 (11-01-17 @ 10:19) (110/72 - 139/79)  BP(mean): --  RR: 18 (11-01-17 @ 10:19) (17 - 18)  SpO2: 100% (11-01-17 @ 10:19) (96% - 100%)    Physical Exam:    Constitutional well preserved,comfortable,    HEENT PERRLA EOMI,No pallor or icterus    No oral exudate or erythema    Neck supple no JVD or LN    R SC HD actheter no erythema or tenderness    Chest Good AE,CTA    CVS RRR S1 S2 WNl No murmur or rub or gallop    Abd soft BS normal No tenderness no masses    Ext No cyanosis clubbing or edema    IV site no erythema tenderness or discharge    Joints no swelling or LOM    CNS non verbal    Lab Data:                          11.0   8.1   )-----------( 298      ( 01 Nov 2017 06:58 )             34.9       11-01    137  |  96  |  37<H>  ----------------------------<  120<H>  4.3   |  26  |  3.79<H>    Ca    9.0      01 Nov 2017 06:58  Phos  3.1     10-31  Mg     2.4     10-31          Culture - Blood (collected 31 Oct 2017 09:19)  Source: .Blood Blood-Venous  Preliminary Report (01 Nov 2017 10:00):    No growth to date.    Culture - Blood (collected 31 Oct 2017 09:19)  Source: .Blood Blood-Peripheral  Preliminary Report (01 Nov 2017 10:00):    No growth to date.

## 2017-11-01 NOTE — PROGRESS NOTE ADULT - ASSESSMENT
73 yo with DM,ESRD recent HD,CVA ,non verbal.Recent bacteremia,pyelo s/p antimicrobia   hypotension during HD    Blood Cx-CNS and Bacillus-Initial Cx negative  But 10/24 blood Cx again with CNS      Clinically stable  No fevers  ?Line related CNS BSI v contamination  Will recommend continue IV vanco 500 mg post HD X 2 more doses  Repeat surveillance cultures after vanco finishes  If still positive may need HD catheter removal  Other plan per primary team  Will Follow.  case d/w Dr Macias yesterday  Beeper [id number]-rlve/afterhours/No response-[phone]

## 2017-11-01 NOTE — PROGRESS NOTE ADULT - SUBJECTIVE AND OBJECTIVE BOX
CC: hypotension @ dialysis    TEAM 4 Medicine Intern: Oscar Veraeras  Spectralink: [phone]  Pager: [phone]    HPI/INTERVAL HISTORY:  Overnight, no acute events. Patient seen and examined at bedside this AM. Patient nonverbal at baseline.    OBJECTIVE:  VITAL SIGNS:  ICU Vital Signs Last 24 Hrs  T(C): 36.4 (01 Nov 2017 05:49), Max: 36.8 (31 Oct 2017 21:18)  T(F): 97.6 (01 Nov 2017 05:49), Max: 98.2 (31 Oct 2017 21:18)  HR: 79 (01 Nov 2017 05:49) (79 - 90)  BP: 110/72 (01 Nov 2017 05:49) (110/72 - 139/79)  BP(mean): --  ABP: --  ABP(mean): --  RR: 18 (01 Nov 2017 05:49) (17 - 18)  SpO2: 100% (01 Nov 2017 05:49) (96% - 100%)        10-31 @ 07:01  -  11-01 @ 07:00  --------------------------------------------------------  IN: 360 mL / OUT: 700 mL / NET: -340 mL      CAPILLARY BLOOD GLUCOSE      POCT Blood Glucose.: 130 mg/dL (01 Nov 2017 08:42)      PHYSICAL EXAM:  GENERAL: NAD, well-developed, sleeping comfortably in bed  HEAD:  Atraumatic, Normocephalic  EYES: conjunctiva and sclera clear  NECK: Supple, No JVD  CHEST/LUNG: Clear to auscultation bilaterally; No wheeze  HEART: Regular rate and rhythm; holosytolic murmur, rubs, or gallops  ABDOMEN: Soft, Nontender, Nondistended; Bowel sounds present  EXTREMITIES:  2+ Peripheral Pulses, No clubbing, cyanosis, or edema  PSYCH: awake and alert, not following commands  SKIN: sacral decubitus stage 3 ulcer present, permacath site in R anterior chest wall in place, small blister on lateral edge of R thumb     LABS:                        11.0   8.1   )-----------( 298      ( 01 Nov 2017 06:58 )             34.9     11-01    137  |  96  |  37<H>  ----------------------------<  120<H>  4.3   |  26  |  3.79<H>    Ca    9.0      01 Nov 2017 06:58  Phos  3.1     10-31  Mg     2.4     10-31        PT/INR - ( 01 Nov 2017 06:58 )   PT: 19.6 sec;   INR: 1.79 ratio         PTT - ( 01 Nov 2017 06:58 )  PTT:34.8 sec          RADIOLOGY & ADDITIONAL TESTS: Reviewed.    ALBUTerol    90 MICROgram(s) HFA Inhaler 2 Puff(s) Inhalation every 6 hours PRN  amiodarone    Tablet 200 milliGRAM(s) Oral daily  artificial  tears Solution 1 Drop(s) Both EYES four times a day  aspirin enteric coated 81 milliGRAM(s) Oral daily  atorvastatin 40 milliGRAM(s) Oral at bedtime  BACItracin   Ointment 1 Application(s) Topical two times a day  busPIRone 5 milliGRAM(s) Oral two times a day  clopidogrel Tablet 75 milliGRAM(s) Oral daily  clotrimazole/betamethasone Cream 1 Application(s) Topical two times a day  dextrose 5%. 1000 milliLiter(s) IV Continuous <Continuous>  dextrose 50% Injectable 12.5 Gram(s) IV Push once  dextrose 50% Injectable 25 Gram(s) IV Push once  dextrose 50% Injectable 25 Gram(s) IV Push once  dextrose Gel 1 Dose(s) Oral once PRN  epoetin georgie Injectable 20462 Unit(s) IV Push <User Schedule>  fluticasone propionate   110 MICROgram(s) HFA Inhaler 1 Puff(s) Inhalation daily  glucagon  Injectable 1 milliGRAM(s) IntraMuscular once PRN  insulin lispro (HumaLOG) corrective regimen sliding scale   SubCutaneous Before meals and at bedtime  Nephro-sophie 1 Tablet(s) Oral daily  pantoprazole    Tablet 40 milliGRAM(s) Oral before breakfast  tamsulosin 0.4 milliGRAM(s) Oral at bedtime  warfarin 5 milliGRAM(s) Oral once      No Known Allergies

## 2017-11-01 NOTE — PROGRESS NOTE ADULT - PROBLEM SELECTOR PLAN 8
will cont dysphagia 1 honey diet pt currently in sinus rhythm  monitor for episodes of AF on tele  INR subtherapeutic today, will dose Coumadin/ F/up INR tmrw In AM

## 2017-11-01 NOTE — PROGRESS NOTE ADULT - PROBLEM SELECTOR PLAN 9
pt currently in sinus rhythm  monitor for episodes of AF on tele  INR subtherapeutic today, will dose Coumadin/ F/up INR tmrw In AM Patient with severe debility due to CVA, dementia - supportive care  Patient is DNR

## 2017-11-01 NOTE — PROGRESS NOTE ADULT - PROBLEM SELECTOR PLAN 10
Patient with severe debility due to CVA, dementia - supportive care  Patient is DNR Had recent embolic CVA. Has been stable  - c/w ASA, Plavix, coumadin, statin

## 2017-11-01 NOTE — PROGRESS NOTE ADULT - PROBLEM SELECTOR PLAN 1
blood cultures with bacillus/coag negative staph likely contaminants- patient with hypotension @ rehab HD/ has been stable in the hospital.   - repeat BCx s/p 24 hours off abx growing gram + cocci in clusters in aerobic/anaerobic bottles/ COANS  - CT A/P negative, CXR negative  - antibiotics discontinued on 10/23 but Vancomycin restarted d/t GPC in blood  - as per ID, IV vanco 500 mg post HD X 7 days, repeat surveillance cultures after vanco finishes/ as discussed with ID / Dr Ruelas (10/31 is day 5) blood cultures with bacillus/coag negative staph likely contaminants- patient with hypotension @ rehab HD/ has been stable in the hospital.   - repeat BCx s/p 24 hours off abx growing gram + cocci in clusters in aerobic/anaerobic bottles/ COANS  - CT A/P negative, CXR negative  - antibiotics discontinued on 10/23 but Vancomycin restarted d/t GPC in blood  - as per ID, IV vanco 500 mg post HD X 7 days, repeat surveillance cultures after vanco finishes/ as discussed with ID / Dr Ruelas (11/1 is day 6) blood cultures with bacillus/coag negative staph likely contaminants- patient with hypotension @ rehab HD/ has been stable in the hospital.   - repeat BCx s/p 24 hours off abx growing gram + cocci in clusters in aerobic/anaerobic bottles/ COANS  - CT A/P negative, CXR negative  - antibiotics discontinued on 10/23 but Vancomycin restarted d/t GPC in blood  - as per ID, IV vanco 500 mg post HD X 7 days,   - will repeat blood cultures before/after next dialysis session and after last vancomycin tx (Saturday 11/4) as per ID

## 2017-11-01 NOTE — PROGRESS NOTE ADULT - ATTENDING COMMENTS
Seen, examined around 11:30am, agree with R1/R2 resident plan od care  - Afebrile, no event on Tele, not in distress  - spoke to Samantha (Dtr) [phone] about the current status and plan of care in details  - Reviewed labs, c/s and ID plan  - Spoke to Dr Solis. Will c/w ID plan, involve Vascular for AVF if repeat blood c/s negative  - c/w current meds  - PT

## 2017-11-01 NOTE — PROGRESS NOTE ADULT - PROBLEM SELECTOR PLAN 3
ESRD on HD, consult called Dr. Elif Solis - HD T, Thu, Saturday though pt has depressed t's on ekg trop is dramatically lower than last troponin  will monitor on telemetry  - cont asa/plavix

## 2017-11-02 LAB
ANION GAP SERPL CALC-SCNC: 19 MMOL/L — HIGH (ref 5–17)
APTT BLD: 38.5 SEC — HIGH (ref 27.5–37.4)
BUN SERPL-MCNC: 50 MG/DL — HIGH (ref 7–23)
CALCIUM SERPL-MCNC: 9 MG/DL — SIGNIFICANT CHANGE UP (ref 8.4–10.5)
CHLORIDE SERPL-SCNC: 96 MMOL/L — SIGNIFICANT CHANGE UP (ref 96–108)
CO2 SERPL-SCNC: 26 MMOL/L — SIGNIFICANT CHANGE UP (ref 22–31)
CREAT SERPL-MCNC: 4.96 MG/DL — HIGH (ref 0.5–1.3)
GLUCOSE BLDC GLUCOMTR-MCNC: 130 MG/DL — HIGH (ref 70–99)
GLUCOSE BLDC GLUCOMTR-MCNC: 134 MG/DL — HIGH (ref 70–99)
GLUCOSE BLDC GLUCOMTR-MCNC: 183 MG/DL — HIGH (ref 70–99)
GLUCOSE BLDC GLUCOMTR-MCNC: 225 MG/DL — HIGH (ref 70–99)
GLUCOSE SERPL-MCNC: 119 MG/DL — HIGH (ref 70–99)
HCT VFR BLD CALC: 30.4 % — LOW (ref 39–50)
HGB BLD-MCNC: 9.7 G/DL — LOW (ref 13–17)
INR BLD: 2.33 RATIO — HIGH (ref 0.88–1.16)
MAGNESIUM SERPL-MCNC: 2.2 MG/DL — SIGNIFICANT CHANGE UP (ref 1.6–2.6)
MCHC RBC-ENTMCNC: 28.9 PG — SIGNIFICANT CHANGE UP (ref 27–34)
MCHC RBC-ENTMCNC: 31.7 GM/DL — LOW (ref 32–36)
MCV RBC AUTO: 91.1 FL — SIGNIFICANT CHANGE UP (ref 80–100)
PHOSPHATE SERPL-MCNC: 3.5 MG/DL — SIGNIFICANT CHANGE UP (ref 2.5–4.5)
PLATELET # BLD AUTO: 286 K/UL — SIGNIFICANT CHANGE UP (ref 150–400)
POTASSIUM SERPL-MCNC: 3.7 MMOL/L — SIGNIFICANT CHANGE UP (ref 3.5–5.3)
POTASSIUM SERPL-SCNC: 3.7 MMOL/L — SIGNIFICANT CHANGE UP (ref 3.5–5.3)
PROTHROM AB SERPL-ACNC: 25.8 SEC — HIGH (ref 9.8–12.7)
RBC # BLD: 3.34 M/UL — LOW (ref 4.2–5.8)
RBC # FLD: 14.5 % — SIGNIFICANT CHANGE UP (ref 10.3–14.5)
SODIUM SERPL-SCNC: 141 MMOL/L — SIGNIFICANT CHANGE UP (ref 135–145)
WBC # BLD: 6.7 K/UL — SIGNIFICANT CHANGE UP (ref 3.8–10.5)
WBC # FLD AUTO: 6.7 K/UL — SIGNIFICANT CHANGE UP (ref 3.8–10.5)

## 2017-11-02 PROCEDURE — 99232 SBSQ HOSP IP/OBS MODERATE 35: CPT

## 2017-11-02 PROCEDURE — 99233 SBSQ HOSP IP/OBS HIGH 50: CPT

## 2017-11-02 RX ORDER — VANCOMYCIN HCL 1 G
500 VIAL (EA) INTRAVENOUS ONCE
Qty: 0 | Refills: 0 | Status: COMPLETED | OUTPATIENT
Start: 2017-11-02 | End: 2017-11-02

## 2017-11-02 RX ORDER — WARFARIN SODIUM 2.5 MG/1
3 TABLET ORAL ONCE
Qty: 0 | Refills: 0 | Status: COMPLETED | OUTPATIENT
Start: 2017-11-02 | End: 2017-11-02

## 2017-11-02 RX ADMIN — Medication 1 PUFF(S): at 17:28

## 2017-11-02 RX ADMIN — Medication 81 MILLIGRAM(S): at 13:06

## 2017-11-02 RX ADMIN — WARFARIN SODIUM 3 MILLIGRAM(S): 2.5 TABLET ORAL at 21:47

## 2017-11-02 RX ADMIN — Medication 1 DROP(S): at 06:03

## 2017-11-02 RX ADMIN — ERYTHROPOIETIN 10000 UNIT(S): 10000 INJECTION, SOLUTION INTRAVENOUS; SUBCUTANEOUS at 10:30

## 2017-11-02 RX ADMIN — Medication 5 MILLIGRAM(S): at 05:56

## 2017-11-02 RX ADMIN — TAMSULOSIN HYDROCHLORIDE 0.4 MILLIGRAM(S): 0.4 CAPSULE ORAL at 21:42

## 2017-11-02 RX ADMIN — CLOTRIMAZOLE AND BETAMETHASONE DIPROPIONATE 1 APPLICATION(S): 10; .5 CREAM TOPICAL at 06:04

## 2017-11-02 RX ADMIN — ATORVASTATIN CALCIUM 40 MILLIGRAM(S): 80 TABLET, FILM COATED ORAL at 21:43

## 2017-11-02 RX ADMIN — Medication 100 MILLIGRAM(S): at 15:10

## 2017-11-02 RX ADMIN — CLOTRIMAZOLE AND BETAMETHASONE DIPROPIONATE 1 APPLICATION(S): 10; .5 CREAM TOPICAL at 17:28

## 2017-11-02 RX ADMIN — CLOPIDOGREL BISULFATE 75 MILLIGRAM(S): 75 TABLET, FILM COATED ORAL at 13:06

## 2017-11-02 RX ADMIN — Medication 1 DROP(S): at 13:05

## 2017-11-02 RX ADMIN — Medication 5 MILLIGRAM(S): at 17:27

## 2017-11-02 RX ADMIN — Medication 1 TABLET(S): at 17:29

## 2017-11-02 RX ADMIN — AMIODARONE HYDROCHLORIDE 200 MILLIGRAM(S): 400 TABLET ORAL at 06:03

## 2017-11-02 RX ADMIN — Medication 1 DROP(S): at 17:26

## 2017-11-02 RX ADMIN — PANTOPRAZOLE SODIUM 40 MILLIGRAM(S): 20 TABLET, DELAYED RELEASE ORAL at 06:04

## 2017-11-02 RX ADMIN — Medication 2: at 18:21

## 2017-11-02 RX ADMIN — Medication 1 APPLICATION(S): at 06:03

## 2017-11-02 RX ADMIN — Medication 1 APPLICATION(S): at 17:27

## 2017-11-02 RX ADMIN — Medication: at 22:45

## 2017-11-02 NOTE — PROGRESS NOTE ADULT - ASSESSMENT
71 yo with DM,ESRD recent HD,CVA ,non verbal.Recent bacteremia,pyelo s/p antimicrobia   hypotension during HD    Blood Cx-CNS and Bacillus-Initial Cx negative  But 10/24 blood Cx again with CNS      Clinically stable  No fevers  ?Line related CNS BSI v contamination  Will recommend continue IV vanco 500 mg post HD X 2 more doses  Repeat surveillance cultures after vanco finishes  If still positive may need HD catheter removal  Other plan per primary team  Will Follow.  Beeper [phone]-tsce/afterhours/No response-[id number]

## 2017-11-02 NOTE — PROGRESS NOTE ADULT - ATTENDING COMMENTS
Seen, examined. agree with R1/R2 plan of care  spoke to ID, will repeat blood c/s in next HD, IV vanco last dose today  - Vascular Sx consult for AVF prior to d/c to [facility]  - c/w current care, will speak to Dtr for dysphagia w/u

## 2017-11-02 NOTE — PROGRESS NOTE ADULT - SUBJECTIVE AND OBJECTIVE BOX
CC: hypotension @ dialysis    TEAM 4 Medicine Intern: Oscar Lorenzo  Spectralink: [phone]  Pager: [phone] CC: hypotension @ dialysis    TEAM 4 Medicine Intern: Oscar Lorenzo  Spectralink: [phone]  Pager: [phone]    HPI/INTERVAL HISTORY: Overnight, no acute events. Patient daughter noted that patient was complaining of food being stuck in his throat. Talked to patient family at length regarding issue, appears that it was a one time issue. Patient recently had speech and swallow evaluation in September after large stroke. Patient seen and examined at bedside this AM. Patient remains mostly nonverbal with staff at baseline.    OBJECTIVE:  VITAL SIGNS:  ICU Vital Signs Last 24 Hrs  T(C): 37.3 (02 Nov 2017 12:48), Max: 37.3 (02 Nov 2017 12:48)  T(F): 99.2 (02 Nov 2017 12:48), Max: 99.2 (02 Nov 2017 12:48)  HR: 98 (02 Nov 2017 12:48) (81 - 98)  BP: 103/61 (02 Nov 2017 12:48) (102/73 - 145/86)  BP(mean): --  ABP: --  ABP(mean): --  RR: 18 (02 Nov 2017 12:48) (17 - 18)  SpO2: 98% (02 Nov 2017 12:48) (95% - 100%)        11-01 @ 07:01 - 11-02 @ 07:00  --------------------------------------------------------  IN: 540 mL / OUT: 0 mL / NET: 540 mL    11-02 @ 07:01  -  11-02 @ 14:49  --------------------------------------------------------  IN: 320 mL / OUT: 1000 mL / NET: -680 mL      CAPILLARY BLOOD GLUCOSE      POCT Blood Glucose.: 130 mg/dL (02 Nov 2017 12:46)      PHYSICAL EXAM:  GENERAL: NAD, well-developed, sleeping comfortably in bed  HEAD:  Atraumatic, Normocephalic  EYES: conjunctiva and sclera clear  NECK: Supple, No JVD  CHEST/LUNG: Clear to auscultation bilaterally; No wheeze  HEART: Regular rate and rhythm; holosytolic murmur, rubs, or gallops  ABDOMEN: Soft, Nontender, Nondistended; Bowel sounds present  EXTREMITIES:  2+ Peripheral Pulses, No clubbing, cyanosis, or edema  PSYCH: awake and alert, not following commands  SKIN: sacral decubitus stage 3 ulcer present, permacath site in R anterior chest wall in place, small blister on lateral edge of R thumb     LABS:                        9.7    6.7   )-----------( 286      ( 02 Nov 2017 09:38 )             30.4     11-02    141  |  96  |  50<H>  ----------------------------<  119<H>  3.7   |  26  |  4.96<H>    Ca    9.0      02 Nov 2017 09:38  Phos  3.5     11-02  Mg     2.2     11-02        PT/INR - ( 02 Nov 2017 09:38 )   PT: 25.8 sec;   INR: 2.33 ratio         PTT - ( 02 Nov 2017 09:38 )  PTT:38.5 sec          RADIOLOGY & ADDITIONAL TESTS: Reviewed.    ALBUTerol    90 MICROgram(s) HFA Inhaler 2 Puff(s) Inhalation every 6 hours PRN  amiodarone    Tablet 200 milliGRAM(s) Oral daily  artificial  tears Solution 1 Drop(s) Both EYES four times a day  aspirin enteric coated 81 milliGRAM(s) Oral daily  atorvastatin 40 milliGRAM(s) Oral at bedtime  BACItracin   Ointment 1 Application(s) Topical two times a day  busPIRone 5 milliGRAM(s) Oral two times a day  clopidogrel Tablet 75 milliGRAM(s) Oral daily  clotrimazole/betamethasone Cream 1 Application(s) Topical two times a day  dextrose 5%. 1000 milliLiter(s) IV Continuous <Continuous>  dextrose 50% Injectable 12.5 Gram(s) IV Push once  dextrose 50% Injectable 25 Gram(s) IV Push once  dextrose 50% Injectable 25 Gram(s) IV Push once  dextrose Gel 1 Dose(s) Oral once PRN  epoetin georgie Injectable 88869 Unit(s) IV Push <User Schedule>  fluticasone propionate   110 MICROgram(s) HFA Inhaler 1 Puff(s) Inhalation daily  glucagon  Injectable 1 milliGRAM(s) IntraMuscular once PRN  insulin lispro (HumaLOG) corrective regimen sliding scale   SubCutaneous Before meals and at bedtime  Nephro-sophie 1 Tablet(s) Oral daily  pantoprazole    Tablet 40 milliGRAM(s) Oral before breakfast  tamsulosin 0.4 milliGRAM(s) Oral at bedtime  vancomycin  IVPB 500 milliGRAM(s) IV Intermittent once  warfarin 3 milliGRAM(s) Oral once      No Known Allergies

## 2017-11-02 NOTE — PROGRESS NOTE ADULT - SUBJECTIVE AND OBJECTIVE BOX
Patient is a 72y old  Male who presents with a chief complaint of s/p cardiac cath (24 Oct 2017 15:51)    Being followed by ID for bacteremia    Interval history:Undergoing HD  No acute events      ROS:  Not available    Antimicrobials:    vanco post HD     Vital Signs Last 24 Hrs  T(C): 36.7 (11-02-17 @ 08:20), Max: 36.8 (11-01-17 @ 12:53)  T(F): 98.1 (11-02-17 @ 08:20), Max: 98.3 (11-01-17 @ 12:53)  HR: 82 (11-02-17 @ 08:20) (81 - 92)  BP: 130/72 (11-02-17 @ 08:20) (113/73 - 145/86)  BP(mean): --  RR: 17 (11-02-17 @ 08:20) (17 - 18)  SpO2: 99% (11-02-17 @ 08:20) (95% - 100%)    Physical Exam:    Constitutional well preserved,comfortable,pleasant    HEENT PERRLA EOMI,No pallor or icterus    No oral exudate or erythema    Neck supple no JVD or LN    R SC HD no erythema or tenderness    Chest Good AE,CTA    CVS RRR S1 S2 WNl No murmur or rub or gallop    Abd soft BS normal No tenderness no masses    Ext No cyanosis clubbing or edema    IV site no erythema tenderness or discharge    Joints no swelling or LOM    CNS non verbal     Lab Data:                          9.7    6.7   )-----------( 286      ( 02 Nov 2017 09:38 )             30.4       11-02    141  |  96  |  50<H>  ----------------------------<  119<H>  3.7   |  26  |  4.96<H>    Ca    9.0      02 Nov 2017 09:38  Phos  3.5     11-02  Mg     2.2     11-02          Culture - Blood (collected 31 Oct 2017 09:19)  Source: .Blood Blood-Venous  Preliminary Report (01 Nov 2017 10:00):    No growth to date.    Culture - Blood (collected 31 Oct 2017 09:19)  Source: .Blood Blood-Peripheral  Preliminary Report (01 Nov 2017 10:00):    No growth to date.

## 2017-11-02 NOTE — PROGRESS NOTE ADULT - PROBLEM SELECTOR PLAN 1
patient with hypotension @ rehab HD/ has been stable and afebrile in the hospital  - repeat BCx s/p 24 hours growing coagulase negative staph in aerobic/anaerobic bottles  - CT A/P negative, CXR negative  - patient completed 7 day course of vancomycin 500mg post dialysis today (11/2)  - will repeat blood cultures after next dialysis session on Saturday (11/4)  - if repeat blood cultures are positive, will consider removing permacath

## 2017-11-02 NOTE — PROGRESS NOTE ADULT - PROBLEM SELECTOR PLAN 8
pt currently in sinus rhythm  monitor for episodes of AF on tele  INR therapeutic today, will dose Coumadin 3mg tonight and follow up INR in AM

## 2017-11-02 NOTE — PROGRESS NOTE ADULT - SUBJECTIVE AND OBJECTIVE BOX
Subjective: Sitting in bed. Confused. Being assisted with pureed diet. No dyspnea. Stable HD earlier today.       MEDICATIONS  (STANDING):  amiodarone    Tablet 200 milliGRAM(s) Oral daily  artificial  tears Solution 1 Drop(s) Both EYES four times a day  aspirin enteric coated 81 milliGRAM(s) Oral daily  atorvastatin 40 milliGRAM(s) Oral at bedtime  BACItracin   Ointment 1 Application(s) Topical two times a day  busPIRone 5 milliGRAM(s) Oral two times a day  clopidogrel Tablet 75 milliGRAM(s) Oral daily  clotrimazole/betamethasone Cream 1 Application(s) Topical two times a day  dextrose 5%. 1000 milliLiter(s) (50 mL/Hr) IV Continuous <Continuous>  dextrose 50% Injectable 12.5 Gram(s) IV Push once  dextrose 50% Injectable 25 Gram(s) IV Push once  dextrose 50% Injectable 25 Gram(s) IV Push once  epoetin georgie Injectable 07510 Unit(s) IV Push <User Schedule>  fluticasone propionate   110 MICROgram(s) HFA Inhaler 1 Puff(s) Inhalation daily  insulin lispro (HumaLOG) corrective regimen sliding scale   SubCutaneous Before meals and at bedtime  Nephro-sophie 1 Tablet(s) Oral daily  pantoprazole    Tablet 40 milliGRAM(s) Oral before breakfast  tamsulosin 0.4 milliGRAM(s) Oral at bedtime  vancomycin  IVPB 500 milliGRAM(s) IV Intermittent once  warfarin 3 milliGRAM(s) Oral once    MEDICATIONS  (PRN):  ALBUTerol    90 MICROgram(s) HFA Inhaler 2 Puff(s) Inhalation every 6 hours PRN Shortness of Breath and/or Wheezing  dextrose Gel 1 Dose(s) Oral once PRN Blood Glucose LESS THAN 70 milliGRAM(s)/deciliter  glucagon  Injectable 1 milliGRAM(s) IntraMuscular once PRN Glucose LESS THAN 70 milligrams/deciliter          T(C): 37.3 (11-02-17 @ 12:48), Max: 37.3 (11-02-17 @ 12:48)  HR: 98 (11-02-17 @ 12:48) (81 - 98)  BP: 103/61 (11-02-17 @ 12:48) (102/73 - 145/86)  RR: 18 (11-02-17 @ 12:48) (17 - 18)  SpO2: 98% (11-02-17 @ 12:48) (95% - 100%)  Wt(kg): --        I&O's Detail    01 Nov 2017 07:01  -  02 Nov 2017 07:00  --------------------------------------------------------  IN:    Oral Fluid: 540 mL  Total IN: 540 mL    OUT:  Total OUT: 0 mL    Total NET: 540 mL      02 Nov 2017 07:01  -  02 Nov 2017 14:54  --------------------------------------------------------  IN:    Oral Fluid: 320 mL  Total IN: 320 mL    OUT:    Other: 1000 mL  Total OUT: 1000 mL    Total NET: -680 mL               PHYSICAL EXAM:    GENERAL: comfortable.   EYES: EOMI, PERRLA, conjunctiva and sclera clear  NECK: Supple, no inc in JVP  CHEST/LUNG: poor effort  HEART: S1S2  ABDOMEN: Soft, Nontender, Nondistended; Bowel sounds present  EXTREMITIES:  trace edema  NEURO: no asterixis  ACCESS: R chest PC      LABS:  CBC Full  -  ( 02 Nov 2017 09:38 )  WBC Count : 6.7 K/uL  Hemoglobin : 9.7 g/dL  Hematocrit : 30.4 %  Platelet Count - Automated : 286 K/uL  Mean Cell Volume : 91.1 fl  Mean Cell Hemoglobin : 28.9 pg  Mean Cell Hemoglobin Concentration : 31.7 gm/dL  Auto Neutrophil # : x  Auto Lymphocyte # : x  Auto Monocyte # : x  Auto Eosinophil # : x  Auto Basophil # : x  Auto Neutrophil % : x  Auto Lymphocyte % : x  Auto Monocyte % : x  Auto Eosinophil % : x  Auto Basophil % : x    11-02    141  |  96  |  50<H>  ----------------------------<  119<H>  3.7   |  26  |  4.96<H>    Ca    9.0      02 Nov 2017 09:38  Phos  3.5     11-02  Mg     2.2     11-02      PT/INR - ( 02 Nov 2017 09:38 )   PT: 25.8 sec;   INR: 2.33 ratio         PTT - ( 02 Nov 2017 09:38 )  PTT:38.5 sec    Culture Results:   No growth to date. (10-31 @ 09:19)  Culture Results:   No growth to date. (10-31 @ 09:19)      Assessment and Recommendation:     ESRD on HD, admitted for eval/management of bacteremia. On Vanco thrice weekly after HD  Follow repeat BCx. If persistently pos may indicate cath seeding.   AVF eventually once cleared by ID  Stable HD earlier today. 1.5kg removed

## 2017-11-02 NOTE — PROGRESS NOTE ADULT - PROBLEM SELECTOR PLAN 2
ESRD on HD, consult called Dr. Elif Solis - HD T, Thu, Saturday [email or web address]. Spoke to Samantha-Dtr thtt AVF is necessary for placement in HD centers. She agreed to ask Vascular Surgery, once blood c/s is negative

## 2017-11-03 LAB
ANION GAP SERPL CALC-SCNC: 16 MMOL/L — SIGNIFICANT CHANGE UP (ref 5–17)
APTT BLD: 38.8 SEC — HIGH (ref 27.5–37.4)
BUN SERPL-MCNC: 39 MG/DL — HIGH (ref 7–23)
CALCIUM SERPL-MCNC: 9.2 MG/DL — SIGNIFICANT CHANGE UP (ref 8.4–10.5)
CHLORIDE SERPL-SCNC: 97 MMOL/L — SIGNIFICANT CHANGE UP (ref 96–108)
CO2 SERPL-SCNC: 26 MMOL/L — SIGNIFICANT CHANGE UP (ref 22–31)
CREAT SERPL-MCNC: 3.9 MG/DL — HIGH (ref 0.5–1.3)
GLUCOSE BLDC GLUCOMTR-MCNC: 148 MG/DL — HIGH (ref 70–99)
GLUCOSE BLDC GLUCOMTR-MCNC: 197 MG/DL — HIGH (ref 70–99)
GLUCOSE BLDC GLUCOMTR-MCNC: 209 MG/DL — HIGH (ref 70–99)
GLUCOSE BLDC GLUCOMTR-MCNC: 236 MG/DL — HIGH (ref 70–99)
GLUCOSE SERPL-MCNC: 155 MG/DL — HIGH (ref 70–99)
HCT VFR BLD CALC: 32.5 % — LOW (ref 39–50)
HGB BLD-MCNC: 10.2 G/DL — LOW (ref 13–17)
INR BLD: 3.01 RATIO — HIGH (ref 0.88–1.16)
MAGNESIUM SERPL-MCNC: 2.1 MG/DL — SIGNIFICANT CHANGE UP (ref 1.6–2.6)
MCHC RBC-ENTMCNC: 28.5 PG — SIGNIFICANT CHANGE UP (ref 27–34)
MCHC RBC-ENTMCNC: 31.4 GM/DL — LOW (ref 32–36)
MCV RBC AUTO: 90.9 FL — SIGNIFICANT CHANGE UP (ref 80–100)
PHOSPHATE SERPL-MCNC: 2.4 MG/DL — LOW (ref 2.5–4.5)
PLATELET # BLD AUTO: 283 K/UL — SIGNIFICANT CHANGE UP (ref 150–400)
POTASSIUM SERPL-MCNC: 3.8 MMOL/L — SIGNIFICANT CHANGE UP (ref 3.5–5.3)
POTASSIUM SERPL-SCNC: 3.8 MMOL/L — SIGNIFICANT CHANGE UP (ref 3.5–5.3)
PROTHROM AB SERPL-ACNC: 33.2 SEC — HIGH (ref 9.8–12.7)
RBC # BLD: 3.58 M/UL — LOW (ref 4.2–5.8)
RBC # FLD: 14.9 % — HIGH (ref 10.3–14.5)
SODIUM SERPL-SCNC: 139 MMOL/L — SIGNIFICANT CHANGE UP (ref 135–145)
WBC # BLD: 6.8 K/UL — SIGNIFICANT CHANGE UP (ref 3.8–10.5)
WBC # FLD AUTO: 6.8 K/UL — SIGNIFICANT CHANGE UP (ref 3.8–10.5)

## 2017-11-03 PROCEDURE — 99222 1ST HOSP IP/OBS MODERATE 55: CPT | Mod: GC

## 2017-11-03 PROCEDURE — 99232 SBSQ HOSP IP/OBS MODERATE 35: CPT

## 2017-11-03 PROCEDURE — 99233 SBSQ HOSP IP/OBS HIGH 50: CPT

## 2017-11-03 RX ORDER — PANTOPRAZOLE SODIUM 20 MG/1
40 TABLET, DELAYED RELEASE ORAL
Qty: 0 | Refills: 0 | Status: DISCONTINUED | OUTPATIENT
Start: 2017-11-03 | End: 2017-11-16

## 2017-11-03 RX ADMIN — Medication 1 DROP(S): at 00:10

## 2017-11-03 RX ADMIN — Medication 2: at 21:41

## 2017-11-03 RX ADMIN — Medication 81 MILLIGRAM(S): at 12:07

## 2017-11-03 RX ADMIN — Medication 5 MILLIGRAM(S): at 17:34

## 2017-11-03 RX ADMIN — Medication 2: at 13:26

## 2017-11-03 RX ADMIN — Medication 1 DROP(S): at 06:46

## 2017-11-03 RX ADMIN — PANTOPRAZOLE SODIUM 40 MILLIGRAM(S): 20 TABLET, DELAYED RELEASE ORAL at 06:47

## 2017-11-03 RX ADMIN — CLOPIDOGREL BISULFATE 75 MILLIGRAM(S): 75 TABLET, FILM COATED ORAL at 12:07

## 2017-11-03 RX ADMIN — Medication 5 MILLIGRAM(S): at 06:47

## 2017-11-03 RX ADMIN — TAMSULOSIN HYDROCHLORIDE 0.4 MILLIGRAM(S): 0.4 CAPSULE ORAL at 21:41

## 2017-11-03 RX ADMIN — Medication 1 PUFF(S): at 17:34

## 2017-11-03 RX ADMIN — CLOTRIMAZOLE AND BETAMETHASONE DIPROPIONATE 1 APPLICATION(S): 10; .5 CREAM TOPICAL at 17:34

## 2017-11-03 RX ADMIN — ATORVASTATIN CALCIUM 40 MILLIGRAM(S): 80 TABLET, FILM COATED ORAL at 21:41

## 2017-11-03 RX ADMIN — AMIODARONE HYDROCHLORIDE 200 MILLIGRAM(S): 400 TABLET ORAL at 06:47

## 2017-11-03 RX ADMIN — Medication 1 TABLET(S): at 12:07

## 2017-11-03 RX ADMIN — Medication 1 APPLICATION(S): at 17:38

## 2017-11-03 RX ADMIN — Medication 1 DROP(S): at 17:34

## 2017-11-03 RX ADMIN — CLOTRIMAZOLE AND BETAMETHASONE DIPROPIONATE 1 APPLICATION(S): 10; .5 CREAM TOPICAL at 06:47

## 2017-11-03 RX ADMIN — Medication 1 APPLICATION(S): at 06:46

## 2017-11-03 RX ADMIN — Medication 1 DROP(S): at 12:07

## 2017-11-03 RX ADMIN — Medication 1: at 17:35

## 2017-11-03 NOTE — PROGRESS NOTE ADULT - ATTENDING COMMENTS
Seen, examined. No distress, no CP/SOB, afebrile, VSS  Repeat blood c/s in next HD, IV vanco last dose was yesterday.  - ID plan noted.  - Vascular Sx consult for AVF prior to d/c to [facility]  - c/w current care. Seen, examined. No distress, no CP/SOB, afebrile, VSS  Repeat blood c/s in next HD, IV vanco last dose was yesterday.  - ID plan noted.  - Vascular Sx consult for AVF prior to d/c to [facility]  - c/w current care. Spoke to Samantha-Dtr  - DNR

## 2017-11-03 NOTE — PROGRESS NOTE ADULT - SUBJECTIVE AND OBJECTIVE BOX
CC: hypotension @ dialysis    TEAM 4 Medicine Intern: Oscar Lorenzo  Spectralink: [phone]  Pager: [phone] CC: hypotension @ dialysis    TEAM 4 Medicine Intern: Oscar Lorenzo  Spectralink: [phone]  Pager: [phone]    HPI/INTERVAL HISTORY:  Patient seen and examined at bedside.    OBJECTIVE:  VITAL SIGNS:  ICU Vital Signs Last 24 Hrs  T(C): 36.8 (03 Nov 2017 04:29), Max: 37.3 (02 Nov 2017 12:48)  T(F): 98.3 (03 Nov 2017 04:29), Max: 99.2 (02 Nov 2017 12:48)  HR: 98 (03 Nov 2017 04:29) (73 - 98)  BP: 118/47 (03 Nov 2017 04:29) (102/73 - 124/80)  BP(mean): --  ABP: --  ABP(mean): --  RR: 18 (03 Nov 2017 04:29) (18 - 18)  SpO2: 95% (03 Nov 2017 04:29) (95% - 98%)        11-02 @ 07:01  -  11-03 @ 07:00  --------------------------------------------------------  IN: 1060 mL / OUT: 1000 mL / NET: 60 mL      CAPILLARY BLOOD GLUCOSE      POCT Blood Glucose.: 148 mg/dL (03 Nov 2017 08:45)    PHYSICAL EXAM:  GENERAL: NAD, well-developed, sleeping comfortably in bed  HEAD:  Atraumatic, Normocephalic  EYES: conjunctiva and sclera clear  NECK: Supple, No JVD  CHEST/LUNG: Clear to auscultation bilaterally; No wheeze  HEART: Regular rate and rhythm; holosytolic murmur, rubs, or gallops  ABDOMEN: Soft, Nontender, Nondistended; Bowel sounds present  EXTREMITIES:  2+ Peripheral Pulses, No clubbing, cyanosis, or edema  PSYCH: awake and alert, not following commands  SKIN: sacral decubitus stage 3 ulcer present, permacath site in R anterior chest wall in place, small blister on lateral edge of R thumb       LABS:                        9.7    6.7   )-----------( 286      ( 02 Nov 2017 09:38 )             30.4     11-02    141  |  96  |  50<H>  ----------------------------<  119<H>  3.7   |  26  |  4.96<H>    Ca    9.0      02 Nov 2017 09:38  Phos  3.5     11-02  Mg     2.2     11-02        PT/INR - ( 02 Nov 2017 09:38 )   PT: 25.8 sec;   INR: 2.33 ratio         PTT - ( 02 Nov 2017 09:38 )  PTT:38.5 sec          RADIOLOGY & ADDITIONAL TESTS: Reviewed.    ALBUTerol    90 MICROgram(s) HFA Inhaler 2 Puff(s) Inhalation every 6 hours PRN  amiodarone    Tablet 200 milliGRAM(s) Oral daily  artificial  tears Solution 1 Drop(s) Both EYES four times a day  aspirin enteric coated 81 milliGRAM(s) Oral daily  atorvastatin 40 milliGRAM(s) Oral at bedtime  BACItracin   Ointment 1 Application(s) Topical two times a day  busPIRone 5 milliGRAM(s) Oral two times a day  clopidogrel Tablet 75 milliGRAM(s) Oral daily  clotrimazole/betamethasone Cream 1 Application(s) Topical two times a day  dextrose 5%. 1000 milliLiter(s) IV Continuous <Continuous>  dextrose 50% Injectable 12.5 Gram(s) IV Push once  dextrose 50% Injectable 25 Gram(s) IV Push once  dextrose 50% Injectable 25 Gram(s) IV Push once  dextrose Gel 1 Dose(s) Oral once PRN  epoetin georgie Injectable 94917 Unit(s) IV Push <User Schedule>  fluticasone propionate   110 MICROgram(s) HFA Inhaler 1 Puff(s) Inhalation daily  glucagon  Injectable 1 milliGRAM(s) IntraMuscular once PRN  insulin lispro (HumaLOG) corrective regimen sliding scale   SubCutaneous Before meals and at bedtime  Nephro-sophie 1 Tablet(s) Oral daily  pantoprazole    Tablet 40 milliGRAM(s) Oral before breakfast  tamsulosin 0.4 milliGRAM(s) Oral at bedtime      No Known Allergies

## 2017-11-03 NOTE — PROGRESS NOTE ADULT - PROBLEM SELECTOR PLAN 1
patient with hypotension @ rehab HD/ has been stable and afebrile in the hospital  - repeat BCxs growing coagulase negative staph in aerobic/anaerobic bottles  - CT A/P negative, CXR negative  - patient completed 7 day course of vancomycin 500mg post dialysis yesterday (11/2)  - will repeat blood cultures after next dialysis session on Saturday (11/4)  - if repeat blood cultures are positive, will consider removing permacath

## 2017-11-03 NOTE — PROGRESS NOTE ADULT - PROBLEM SELECTOR PLAN 2
ESRD on HD, consult called Dr. Elif Solis - HD T, Thu, Saturday [email or web address]. Spoke to Samantha-Dtr thtt AVF is necessary for placement in HD centers. She agreed to ask Vascular Surgery, once blood c/s is negative

## 2017-11-03 NOTE — PROGRESS NOTE ADULT - ASSESSMENT
72 year old male with PMH of DM, HTN, HLD, Embolic CVA with right hemiplegia, COPD, bipolar, bilateral nephrolithiasis and bladder stones, ASHD, discharged 10/18 after long hospital stay when initially presented with NSTEMI/pulm edema, UTI, VT s/p shock, embolic CVA, new AF, re-admitted after hypotensive episode in dialysis found with bacteremia x2/ with 2 different organisms likely contaminants.  However, repeat blood culture done off antibiotics grew COANS and was restarted on Vancomycin per ID

## 2017-11-03 NOTE — CONSULT NOTE ADULT - ASSESSMENT
72 year old male with PMH of DM. HTN, HLD, Embolic CVA with right hemiplegia, COPD, bipolar, bilateral nephrolithiasis and bladder stones, ASHD, discharged 10/18 after long hospital stay when initially presented with NSTEMI/pulm edema, UTI, VT s/p shock, embolic CVA, new AF, re-admitted with bacteremia. Appears to have drug rash.  We will arrange for routine dialysis today. F/u final cultures results.  I.D. to evaluate antibiotics coverage given drug rash.
72M new ESRD for fistula planning  - f/u vein mapping  - plan for fistula vs graft early next week - will update with more info  - seen and examined with Dr. Se Malagon MD [id number]
A/P:  Sacral pressure ulcer Probable stage III w/ evolving DTI in center  TRIAD cream w/ pericare    con't Nutrition (as tolerated)  con't Offloading   con't Pericare  Care as per medicine will follow w/ you  Upon discharge f/u as outpatient at [facility] [address] [facility] [phone]  Seen w/ attng and D/w team  Thank you for this consult  Kenisha Chicas PA-C CWS [id number]
72M with multiple comorbidities recently admitted 9/6-10/18/17 for NSTEMI s/p PCI, course complicated by PEA/VT arrest, new embolic CVA and progression of CKD now ESRD on HD via Right permacath. Also treated for pyelonephritis with Ceftriaxone from 9/6-9/21/17. Blood and urine cultures were all negative. Readmitted 10/20/17 for hypotension while receiving dialysis.   ID consulted for gram variable rods on blood cultures.   Unclear source or if true bacteremia. No fevers and no leukocytosis. Does not appear toxic.   Rash unclear etiology. Does not appear like cellulitis.     Recommend  -repeat blood cultures   -f/u current cultures in lab   -CT abdomen pelvis noncontrast   -Vancomycin 1GM once then Zosyn 3.375GM IV q12h empirically     Discussed with Dr Huntley and primary team

## 2017-11-03 NOTE — CONSULT NOTE ADULT - SUBJECTIVE AND OBJECTIVE BOX
GENERAL SURGERY CONSULT NOTE    Patient is a 72y old  Male who presents with a chief complaint of s/p cardiac cath (24 Oct 2017 15:51)      HPI:  71 yo M PMH CAD s/p 12-14 stents placed [id number], T2 DM. HTN HLD, w/ residual R sided facial weakness, radiculopathy with herniated disc, COPD, CKD Cr 2 months ago 1.2 on dialysis MWF, bipolar (no meds followed by psychiatry), chronic kidney stones and has had urethral tents in the past presents from rehab facility for episode of hypotension to SBP 90s while at dialysis today.   Pt was discharged 10/18 after long hospital stay when initially presented with NSTEMI/pulm edema and transferred to [facility]  for LHC + BABAR x 3 + ARF on CKD +HD, + UTI on CTX.  Pt declined HD at one point, rising K, did undergo HD and later afterwards + unresponsive, CODE BLUE x 20 min with ROSC c/b VT s/p shock/amio load c/b CVA (r.weakness + embolic CVAs on CT), + new AF + heparin gtt c/b groin hematoma s/p txn, new CHF EF 35%, + palliative care discussions + dnr/dni.  Planned to cont HD via new r.permcath.  dysphagia treated with dysphagia 1 diet with honey liquids, PPM declined by family and sent to rehab.  Pt sbps low 100s to 110s.  on discharge trop 7.64    Pt needs AV Fistula for placement in HD centers. Vascular surgery was consulted in this context. The patient is minimally verbal, but Dr. Saez saw him with his brother at the bedside. I also called his daughter, Anisa, to update her on the vascular surgery plan.    PAST MEDICAL & SURGICAL HISTORY:  CVA (cerebral vascular accident)  COPD (chronic obstructive pulmonary disease)  BPH (benign prostatic hyperplasia)  Bipolar affective disorder  CKD (chronic kidney disease)  Pancreatitis  Hypertension  Dyslipidemia  Diabetes mellitus  Coronary artery disease  HLD (hyperlipidemia)  Anemia  Acute renal failure  CKD (chronic kidney disease)  COPD (chronic obstructive pulmonary disease)  Fainting  Cardiac arrhythmia  Dizziness  Hydronephrosis  Cholecystitis  Bipolar 1 disorder  DM (diabetes mellitus)  HTN (hypertension)  Lumbar radiculopathy  Left heart failure  Reflux esophagitis  Coronary atherosclerosis  History of cardiac catheterization  No significant past surgical history        FAMILY HISTORY:  No pertinent family history in first degree relatives      SOCIAL HISTORY:    MEDICATIONS  (STANDING):  amiodarone    Tablet 200 milliGRAM(s) Oral daily  artificial  tears Solution 1 Drop(s) Both EYES four times a day  aspirin enteric coated 81 milliGRAM(s) Oral daily  atorvastatin 40 milliGRAM(s) Oral at bedtime  BACItracin   Ointment 1 Application(s) Topical two times a day  busPIRone 5 milliGRAM(s) Oral two times a day  clopidogrel Tablet 75 milliGRAM(s) Oral daily  clotrimazole/betamethasone Cream 1 Application(s) Topical two times a day  dextrose 5%. 1000 milliLiter(s) (50 mL/Hr) IV Continuous <Continuous>  dextrose 50% Injectable 12.5 Gram(s) IV Push once  dextrose 50% Injectable 25 Gram(s) IV Push once  dextrose 50% Injectable 25 Gram(s) IV Push once  epoetin georgie Injectable 28367 Unit(s) IV Push <User Schedule>  fluticasone propionate   110 MICROgram(s) HFA Inhaler 1 Puff(s) Inhalation daily  insulin lispro (HumaLOG) corrective regimen sliding scale   SubCutaneous Before meals and at bedtime  Nephro-sophie 1 Tablet(s) Oral daily  pantoprazole    Tablet 40 milliGRAM(s) Oral before breakfast  tamsulosin 0.4 milliGRAM(s) Oral at bedtime    MEDICATIONS  (PRN):  ALBUTerol    90 MICROgram(s) HFA Inhaler 2 Puff(s) Inhalation every 6 hours PRN Shortness of Breath and/or Wheezing  dextrose Gel 1 Dose(s) Oral once PRN Blood Glucose LESS THAN 70 milliGRAM(s)/deciliter  glucagon  Injectable 1 milliGRAM(s) IntraMuscular once PRN Glucose LESS THAN 70 milligrams/deciliter    Allergies    No Known Allergies    Intolerances        Vital Signs Last 24 Hrs  T(C): 37 (2017 12:23), Max: 37.2 (2017 21:03)  T(F): 98.6 (2017 12:23), Max: 98.9 (2017 21:03)  HR: 92 (2017 12:23) (73 - 98)  BP: 122/66 (2017 12:23) (118/47 - 124/80)  BP(mean): --  RR: 18 (2017 12:23) (18 - 18)  SpO2: 97% (2017 12:23) (95% - 98%)  Daily     Daily Weight in k.9 (2017 07:06)    PE:  NAD, awake and alert, nonverbal  Residual Right sided weakness  Resp nonlabored  CV Reg  RUE in splint  No skin changes   Palp radial pulses b/l  Hands warm, well perfused                          10.2   6.8   )-----------( 283      ( 2017 09:32 )             32.5     11-03    139  |  97  |  39<H>  ----------------------------<  155<H>  3.8   |  26  |  3.90<H>    Ca    9.2      2017 09:32  Phos  2.4     11-  Mg     2.1     11-03      PT/INR - ( 2017 09:32 )   PT: 33.2 sec;   INR: 3.01 ratio         PTT - ( 2017 09:32 )  PTT:38.8 sec      IMAGING STUDIES:  Vein mapping pending

## 2017-11-03 NOTE — PROGRESS NOTE ADULT - ASSESSMENT
71 yo with DM,ESRD recent HD,CVA ,non verbal.Recent bacteremia,pyelo s/p antimicrobia   hypotension during HD    Blood Cx-CNS and Bacillus-Initial Cx negative  But 10/24 blood Cx again with CNS      Clinically stable  No fevers  ?Line related CNS BSI v contamination  s/p vanco   For Repeat surveillance cultures after next HD   If still positive may need HD catheter removal  Other plan per primary team  Case d/w Dr Macias yesterday  Infectious Diseases Service will cover over weekend.  Please call [phone] if issues

## 2017-11-03 NOTE — PROGRESS NOTE ADULT - SUBJECTIVE AND OBJECTIVE BOX
Patient is a 72y old  Male who presents with a chief complaint of s/p cardiac cath (24 Oct 2017 15:51)    Being followed by ID for bacteremia     Interval history:  No acute events      ROS:  Not obtainable      Antimicrobials:    vanco post HD     Vital Signs Last 24 Hrs  T(C): 36.8 (11-03-17 @ 04:29), Max: 37.3 (11-02-17 @ 12:48)  T(F): 98.3 (11-03-17 @ 04:29), Max: 99.2 (11-02-17 @ 12:48)  HR: 98 (11-03-17 @ 04:29) (73 - 98)  BP: 118/47 (11-03-17 @ 04:29) (102/73 - 124/80)  BP(mean): --  RR: 18 (11-03-17 @ 04:29) (18 - 18)  SpO2: 95% (11-03-17 @ 04:29) (95% - 98%)    Physical Exam:    Constitutional well preserved,comfortable,pleasant    HEENT PERRLA EOMI,No pallor or icterus    No oral exudate or erythema    Neck supple no JVD or LN  R SC HD catheter no erythema or tenderness    Chest Good AE,CTA    CVS RRR S1 S2 WNl No murmur or rub or gallop    Abd soft BS normal No tenderness no masses    Ext No cyanosis clubbing or edema    IV site no erythema tenderness or discharge    Joints no swelling or LOM    CNS Non verbal     Lab Data:                          10.2   6.8   )-----------( 283      ( 03 Nov 2017 09:32 )             32.5       11-03    139  |  97  |  39<H>  ----------------------------<  155<H>  3.8   |  26  |  3.90<H>    Ca    9.2      03 Nov 2017 09:32  Phos  2.4     11-03  Mg     2.1     11-03          Culture - Blood (collected 31 Oct 2017 09:19)  Source: .Blood Blood-Venous  Preliminary Report (01 Nov 2017 10:00):    No growth to date.    Culture - Blood (collected 31 Oct 2017 09:19)  Source: .Blood Blood-Peripheral  Preliminary Report (01 Nov 2017 10:00):    No growth to date.

## 2017-11-04 LAB
ANION GAP SERPL CALC-SCNC: 13 MMOL/L — SIGNIFICANT CHANGE UP (ref 5–17)
APTT BLD: 45.8 SEC — HIGH (ref 27.5–37.4)
BASOPHILS # BLD AUTO: 0 K/UL — SIGNIFICANT CHANGE UP (ref 0–0.2)
BASOPHILS NFR BLD AUTO: 0.6 % — SIGNIFICANT CHANGE UP (ref 0–2)
BUN SERPL-MCNC: 56 MG/DL — HIGH (ref 7–23)
CALCIUM SERPL-MCNC: 9.3 MG/DL — SIGNIFICANT CHANGE UP (ref 8.4–10.5)
CHLORIDE SERPL-SCNC: 96 MMOL/L — SIGNIFICANT CHANGE UP (ref 96–108)
CO2 SERPL-SCNC: 29 MMOL/L — SIGNIFICANT CHANGE UP (ref 22–31)
CREAT SERPL-MCNC: 4.85 MG/DL — HIGH (ref 0.5–1.3)
EOSINOPHIL # BLD AUTO: 0.3 K/UL — SIGNIFICANT CHANGE UP (ref 0–0.5)
EOSINOPHIL NFR BLD AUTO: 4.8 % — SIGNIFICANT CHANGE UP (ref 0–6)
GLUCOSE BLDC GLUCOMTR-MCNC: 117 MG/DL — HIGH (ref 70–99)
GLUCOSE BLDC GLUCOMTR-MCNC: 131 MG/DL — HIGH (ref 70–99)
GLUCOSE BLDC GLUCOMTR-MCNC: 184 MG/DL — HIGH (ref 70–99)
GLUCOSE BLDC GLUCOMTR-MCNC: 231 MG/DL — HIGH (ref 70–99)
GLUCOSE SERPL-MCNC: 144 MG/DL — HIGH (ref 70–99)
GRAM STN FLD: SIGNIFICANT CHANGE UP
HCT VFR BLD CALC: 29.1 % — LOW (ref 39–50)
HGB BLD-MCNC: 9.6 G/DL — LOW (ref 13–17)
INR BLD: 3.11 RATIO — HIGH (ref 0.88–1.16)
LYMPHOCYTES # BLD AUTO: 2 K/UL — SIGNIFICANT CHANGE UP (ref 1–3.3)
LYMPHOCYTES # BLD AUTO: 29.8 % — SIGNIFICANT CHANGE UP (ref 13–44)
MCHC RBC-ENTMCNC: 30.1 PG — SIGNIFICANT CHANGE UP (ref 27–34)
MCHC RBC-ENTMCNC: 33.1 GM/DL — SIGNIFICANT CHANGE UP (ref 32–36)
MCV RBC AUTO: 91 FL — SIGNIFICANT CHANGE UP (ref 80–100)
MONOCYTES # BLD AUTO: 0.5 K/UL — SIGNIFICANT CHANGE UP (ref 0–0.9)
MONOCYTES NFR BLD AUTO: 7.2 % — SIGNIFICANT CHANGE UP (ref 2–14)
NEUTROPHILS # BLD AUTO: 3.8 K/UL — SIGNIFICANT CHANGE UP (ref 1.8–7.4)
NEUTROPHILS NFR BLD AUTO: 57.6 % — SIGNIFICANT CHANGE UP (ref 43–77)
PLATELET # BLD AUTO: 259 K/UL — SIGNIFICANT CHANGE UP (ref 150–400)
POTASSIUM SERPL-MCNC: 3.6 MMOL/L — SIGNIFICANT CHANGE UP (ref 3.5–5.3)
POTASSIUM SERPL-SCNC: 3.6 MMOL/L — SIGNIFICANT CHANGE UP (ref 3.5–5.3)
PROTHROM AB SERPL-ACNC: 34.7 SEC — HIGH (ref 9.8–12.7)
RBC # BLD: 3.2 M/UL — LOW (ref 4.2–5.8)
RBC # FLD: 15.1 % — HIGH (ref 10.3–14.5)
SODIUM SERPL-SCNC: 138 MMOL/L — SIGNIFICANT CHANGE UP (ref 135–145)
WBC # BLD: 6.5 K/UL — SIGNIFICANT CHANGE UP (ref 3.8–10.5)
WBC # FLD AUTO: 6.5 K/UL — SIGNIFICANT CHANGE UP (ref 3.8–10.5)

## 2017-11-04 PROCEDURE — 99233 SBSQ HOSP IP/OBS HIGH 50: CPT

## 2017-11-04 RX ORDER — VANCOMYCIN HCL 1 G
500 VIAL (EA) INTRAVENOUS ONCE
Qty: 0 | Refills: 0 | Status: COMPLETED | OUTPATIENT
Start: 2017-11-04 | End: 2017-11-04

## 2017-11-04 RX ADMIN — PANTOPRAZOLE SODIUM 40 MILLIGRAM(S): 20 TABLET, DELAYED RELEASE ORAL at 06:59

## 2017-11-04 RX ADMIN — CLOPIDOGREL BISULFATE 75 MILLIGRAM(S): 75 TABLET, FILM COATED ORAL at 13:57

## 2017-11-04 RX ADMIN — CLOTRIMAZOLE AND BETAMETHASONE DIPROPIONATE 1 APPLICATION(S): 10; .5 CREAM TOPICAL at 18:35

## 2017-11-04 RX ADMIN — AMIODARONE HYDROCHLORIDE 200 MILLIGRAM(S): 400 TABLET ORAL at 13:57

## 2017-11-04 RX ADMIN — ERYTHROPOIETIN 10000 UNIT(S): 10000 INJECTION, SOLUTION INTRAVENOUS; SUBCUTANEOUS at 09:34

## 2017-11-04 RX ADMIN — Medication 1 APPLICATION(S): at 18:35

## 2017-11-04 RX ADMIN — Medication 1 DROP(S): at 18:35

## 2017-11-04 RX ADMIN — Medication 5 MILLIGRAM(S): at 07:00

## 2017-11-04 RX ADMIN — Medication 1: at 18:13

## 2017-11-04 RX ADMIN — Medication 100 MILLIGRAM(S): at 18:11

## 2017-11-04 RX ADMIN — ATORVASTATIN CALCIUM 40 MILLIGRAM(S): 80 TABLET, FILM COATED ORAL at 21:46

## 2017-11-04 RX ADMIN — Medication 1 APPLICATION(S): at 07:00

## 2017-11-04 RX ADMIN — Medication 2: at 21:46

## 2017-11-04 RX ADMIN — Medication 1 DROP(S): at 06:59

## 2017-11-04 RX ADMIN — Medication 1 DROP(S): at 00:08

## 2017-11-04 RX ADMIN — Medication 1 DROP(S): at 13:58

## 2017-11-04 RX ADMIN — Medication 81 MILLIGRAM(S): at 13:57

## 2017-11-04 RX ADMIN — Medication 1 TABLET(S): at 13:57

## 2017-11-04 RX ADMIN — CLOTRIMAZOLE AND BETAMETHASONE DIPROPIONATE 1 APPLICATION(S): 10; .5 CREAM TOPICAL at 07:00

## 2017-11-04 RX ADMIN — Medication 5 MILLIGRAM(S): at 18:12

## 2017-11-04 RX ADMIN — TAMSULOSIN HYDROCHLORIDE 0.4 MILLIGRAM(S): 0.4 CAPSULE ORAL at 21:46

## 2017-11-04 NOTE — PROGRESS NOTE ADULT - SUBJECTIVE AND OBJECTIVE BOX
Tiki Grady MD  PGY 2  Pager [phone]      Patient is a 72y old  Male who presents with a chief complaint of s/p cardiac cath (24 Oct 2017 15:51)      INTERVAL HPI/OVERNIGHT EVENTS: no overnight events. Attempted use of pacific  however patient not conversing; PCA and RN state this is his baseline. [employer]  [id number]        REVIEW OF SYSTEMS: unable to obtain in non-verbal patient    T(C): 36.6 (11-04-17 @ 04:07), Max: 37 (11-03-17 @ 12:23)  HR: 81 (11-04-17 @ 04:07) (81 - 92)  BP: 133/73 (11-04-17 @ 04:07) (122/66 - 150/80)  RR: 18 (11-04-17 @ 04:07) (18 - 19)  SpO2: 99% (11-04-17 @ 04:07) (97% - 99%)  Wt(kg): --Vital Signs Last 24 Hrs  T(C): 36.6 (04 Nov 2017 04:07), Max: 37 (03 Nov 2017 12:23)  T(F): 97.8 (04 Nov 2017 04:07), Max: 98.6 (03 Nov 2017 12:23)  HR: 81 (04 Nov 2017 04:07) (81 - 92)  BP: 133/73 (04 Nov 2017 04:07) (122/66 - 150/80)  BP(mean): --  RR: 18 (04 Nov 2017 04:07) (18 - 19)  SpO2: 99% (04 Nov 2017 04:07) (97% - 99%)    PHYSICAL EXAM:  GENERAL: NAD  EYES: clear conjunctiva; EOMI  ENMT: Moist mucous membranes  NECK: Supple, No JVD, Normal thyroid  CHEST/LUNG: Clear to auscultation bilaterally; No rales, rhonchi, wheezing, or rubs  HEART: S1, S2, Regular rate and rhythm  ABDOMEN: Soft, Nontender, Nondistended; Bowel sounds present  NEURO: Alert & Oriented X 0  EXTREMITIES: No LE edema, no calf tenderness  LYMPH: No lymphadenopathy noted  SKIN: No rashes or lesions    Consultant(s) Notes Reviewed:  [x ] YES  [ ] NO  Care Discussed with Consultants/Other Providers [ x] YES  [ ] NO    LABS:                        10.2   6.8   )-----------( 283      ( 03 Nov 2017 09:32 )             32.5     11-03    139  |  97  |  39<H>  ----------------------------<  155<H>  3.8   |  26  |  3.90<H>    Ca    9.2      03 Nov 2017 09:32  Phos  2.4     11-03  Mg     2.1     11-03      PT/INR - ( 03 Nov 2017 09:32 )   PT: 33.2 sec;   INR: 3.01 ratio         PTT - ( 03 Nov 2017 09:32 )  PTT:38.8 sec    CAPILLARY BLOOD GLUCOSE      POCT Blood Glucose.: 236 mg/dL (03 Nov 2017 21:01)  POCT Blood Glucose.: 197 mg/dL (03 Nov 2017 17:13)  POCT Blood Glucose.: 209 mg/dL (03 Nov 2017 13:13)  POCT Blood Glucose.: 148 mg/dL (03 Nov 2017 08:45)            RADIOLOGY & ADDITIONAL TESTS:    Imaging Personally Reviewed:  [ ] YES  [ ] NO

## 2017-11-04 NOTE — PROGRESS NOTE ADULT - PROBLEM SELECTOR PLAN 2
ESRD on HD, consult called Dr. Elif Solis - HD T, Thu, Saturday via permcath. Spoke to Samantha-Dtr thtt AVF is necessary for placement in HD centers.   -Vascular sx consult for AVF.

## 2017-11-04 NOTE — PROGRESS NOTE ADULT - PROBLEM SELECTOR PLAN 6
-will cont dysphagia 1 honey diet, aspiration precaution  -RN requests nutrition cs as patient's family concerned about nutrition.

## 2017-11-04 NOTE — PROGRESS NOTE ADULT - ASSESSMENT
72/M with T2DM, HTN, HLD, Embolic CVA with right hemiplegia, COPD, bipolar, bilateral nephrolithiasis and bladder stones, ASHD, discharged 10/18 after long hospital stay when initially presented with NSTEMI/pulm edema, UTI, VT s/p shock, embolic CVA, new AF, re-admitted after hypotensive episode in dialysis found with bacteremia x2/ with 2 different organisms likely contaminants.  However, repeat blood culture done off antibiotics grew COANS and was restarted on Vancomycin per ID.

## 2017-11-04 NOTE — PROGRESS NOTE ADULT - PROBLEM SELECTOR PLAN 1
patient with hypotension @ rehab HD/ has been stable and afebrile in the hospital  - repeat BCxs growing coagulase negative staph in aerobic/anaerobic bottles  - CT A/P negative, CXR negative  - patient completed 7 day course of vancomycin 500mg post dialysis yesterday (11/2)  - dialysis RN refusal to draw blood cultures due to hospital policy. Ordered for peripheral draw by phlebotomy when patient returns from dialysis. D/w RN.   - if repeat blood cultures are positive, will consider removing permacath Afebrile, No SOB, repeat blood c/s growing GPC-11/2  - c/w IV Vanco 500mg post HD  - CT A/P negative, CXR negative  ( dialysis RN refusal to draw blood cultures due to hospital policy. Ordered for peripheral draw by phlebotomy when patient returns from dialysis. D/w RN.)  Called [facility]- Dr Hall ext [phone] to remove the permcath. Because of INR 3.1 he recommended to remove the permcath once INR <2.0. Renal aware

## 2017-11-04 NOTE — PROGRESS NOTE ADULT - SUBJECTIVE AND OBJECTIVE BOX
Seen on HD, non-verbal    Vital Signs Last 24 Hrs  T(C): 36.5 (11-04-17 @ 09:02), Max: 36.8 (11-03-17 @ 23:50)  T(F): 97.7 (11-04-17 @ 09:02), Max: 98.2 (11-03-17 @ 23:50)  HR: 81 (11-04-17 @ 09:02) (81 - 90)  BP: 133/76 (11-04-17 @ 09:02) (133/73 - 150/80)  BP(mean): --  RR: 18 (11-04-17 @ 09:02) (18 - 19)  SpO2: 100% (11-04-17 @ 09:02) (98% - 100%)    Respiratory: b/l air entry, clear  Cardiovascular: S1 S2 regular  Gastrointestinal: soft, NT, BS present  Extremities: no edema     ALBUTerol    90 MICROgram(s) HFA Inhaler 2 Puff(s) Inhalation every 6 hours PRN  amiodarone    Tablet 200 milliGRAM(s) Oral daily  artificial  tears Solution 1 Drop(s) Both EYES four times a day  aspirin enteric coated 81 milliGRAM(s) Oral daily  atorvastatin 40 milliGRAM(s) Oral at bedtime  BACItracin   Ointment 1 Application(s) Topical two times a day  busPIRone 5 milliGRAM(s) Oral two times a day  clopidogrel Tablet 75 milliGRAM(s) Oral daily  clotrimazole/betamethasone Cream 1 Application(s) Topical two times a day  dextrose 5%. 1000 milliLiter(s) IV Continuous <Continuous>  dextrose 50% Injectable 12.5 Gram(s) IV Push once  dextrose 50% Injectable 25 Gram(s) IV Push once  dextrose 50% Injectable 25 Gram(s) IV Push once  dextrose Gel 1 Dose(s) Oral once PRN  epoetin georgie Injectable 88500 Unit(s) IV Push <User Schedule>  fluticasone propionate   110 MICROgram(s) HFA Inhaler 1 Puff(s) Inhalation daily  glucagon  Injectable 1 milliGRAM(s) IntraMuscular once PRN  insulin lispro (HumaLOG) corrective regimen sliding scale   SubCutaneous Before meals and at bedtime  Nephro-sophie 1 Tablet(s) Oral daily  pantoprazole   Suspension 40 milliGRAM(s) Oral before breakfast  tamsulosin 0.4 milliGRAM(s) Oral at bedtime                        9.6    6.5   )-----------( 259      ( 04 Nov 2017 09:32 )             29.1     04 Nov 2017 09:32    138    |  96     |  56     ----------------------------<  144    3.6     |  29     |  4.85     Ca    9.3        04 Nov 2017 09:32  Phos  2.4       03 Nov 2017 09:32  Mg     2.1       03 Nov 2017 09:32    Assessment and Recommendation:     ESRD on HD  HD TTS, TMP 1-2 kg  Procrit w/HD  HD cath site clean, no erythema, no d/c  Adm w/bacteremia, initially thought to be contam, but repeat bld cx still positive  S/p Vanco 3 x week per ID  Favor d/c perm Cath after HD today  Timing of next HD pending clinical course  Timing of new temp/perm HD cath pending clinical course and ID recommendations  Abx per ID  Vascular to eval for AVF once cleared by ID, Cards  D/w medical team  D/w ID fellow on call

## 2017-11-04 NOTE — PROGRESS NOTE ADULT - ATTENDING COMMENTS
Afebrile, calm, VSS  - repeat blood c/s- 11/2 grew GPC likely from permcath infection, on IV Vancomycin 500mg post HD  Called Rad-Dr Hall to remove the permcath, but INR is 3.1  IR will remove it once INR <2.0. Spoke to Renal-Dr Solis  - Hold coumadin, f/u INR daily  - c/w all other meds  ** spoke to Dtr-Samantha Afebrile, calm, VSS  - repeat blood c/s- 11/2 grew GPC likely from permcath infection, on IV Vancomycin 500mg post HD  Called Rad-Dr Hall to remove the permcath, but INR is 3.1  IR will remove it once INR <2.0. Spoke to Renal-Dr Solis  - Hold coumadin, f/u INR daily  - c/w all other meds, aspiration precaution  - DNR  ** spoke to Dtr-Samantha

## 2017-11-05 LAB
ANION GAP SERPL CALC-SCNC: 18 MMOL/L — HIGH (ref 5–17)
APTT BLD: 38.4 SEC — HIGH (ref 27.5–37.4)
BUN SERPL-MCNC: 35 MG/DL — HIGH (ref 7–23)
CALCIUM SERPL-MCNC: 9.5 MG/DL — SIGNIFICANT CHANGE UP (ref 8.4–10.5)
CHLORIDE SERPL-SCNC: 96 MMOL/L — SIGNIFICANT CHANGE UP (ref 96–108)
CO2 SERPL-SCNC: 25 MMOL/L — SIGNIFICANT CHANGE UP (ref 22–31)
CREAT SERPL-MCNC: 3.94 MG/DL — HIGH (ref 0.5–1.3)
CULTURE RESULTS: SIGNIFICANT CHANGE UP
GLUCOSE BLDC GLUCOMTR-MCNC: 164 MG/DL — HIGH (ref 70–99)
GLUCOSE BLDC GLUCOMTR-MCNC: 169 MG/DL — HIGH (ref 70–99)
GLUCOSE BLDC GLUCOMTR-MCNC: 176 MG/DL — HIGH (ref 70–99)
GLUCOSE BLDC GLUCOMTR-MCNC: 187 MG/DL — HIGH (ref 70–99)
GLUCOSE SERPL-MCNC: 170 MG/DL — HIGH (ref 70–99)
HCT VFR BLD CALC: 36 % — LOW (ref 39–50)
HGB BLD-MCNC: 11.6 G/DL — LOW (ref 13–17)
INR BLD: 2.53 RATIO — HIGH (ref 0.88–1.16)
MCHC RBC-ENTMCNC: 29.2 PG — SIGNIFICANT CHANGE UP (ref 27–34)
MCHC RBC-ENTMCNC: 32.1 GM/DL — SIGNIFICANT CHANGE UP (ref 32–36)
MCV RBC AUTO: 91 FL — SIGNIFICANT CHANGE UP (ref 80–100)
PLATELET # BLD AUTO: 293 K/UL — SIGNIFICANT CHANGE UP (ref 150–400)
POTASSIUM SERPL-MCNC: 4.1 MMOL/L — SIGNIFICANT CHANGE UP (ref 3.5–5.3)
POTASSIUM SERPL-SCNC: 4.1 MMOL/L — SIGNIFICANT CHANGE UP (ref 3.5–5.3)
PROTHROM AB SERPL-ACNC: 27.8 SEC — HIGH (ref 9.8–12.7)
RBC # BLD: 3.96 M/UL — LOW (ref 4.2–5.8)
RBC # FLD: 15.3 % — HIGH (ref 10.3–14.5)
SODIUM SERPL-SCNC: 139 MMOL/L — SIGNIFICANT CHANGE UP (ref 135–145)
SPECIMEN SOURCE: SIGNIFICANT CHANGE UP
WBC # BLD: 7 K/UL — SIGNIFICANT CHANGE UP (ref 3.8–10.5)
WBC # FLD AUTO: 7 K/UL — SIGNIFICANT CHANGE UP (ref 3.8–10.5)

## 2017-11-05 PROCEDURE — 99233 SBSQ HOSP IP/OBS HIGH 50: CPT

## 2017-11-05 RX ADMIN — Medication 1 TABLET(S): at 14:07

## 2017-11-05 RX ADMIN — CLOTRIMAZOLE AND BETAMETHASONE DIPROPIONATE 1 APPLICATION(S): 10; .5 CREAM TOPICAL at 06:47

## 2017-11-05 RX ADMIN — ATORVASTATIN CALCIUM 40 MILLIGRAM(S): 80 TABLET, FILM COATED ORAL at 22:22

## 2017-11-05 RX ADMIN — AMIODARONE HYDROCHLORIDE 200 MILLIGRAM(S): 400 TABLET ORAL at 06:46

## 2017-11-05 RX ADMIN — Medication 1 DROP(S): at 23:09

## 2017-11-05 RX ADMIN — Medication 1 PUFF(S): at 18:21

## 2017-11-05 RX ADMIN — CLOPIDOGREL BISULFATE 75 MILLIGRAM(S): 75 TABLET, FILM COATED ORAL at 14:08

## 2017-11-05 RX ADMIN — Medication: at 18:03

## 2017-11-05 RX ADMIN — Medication 1 APPLICATION(S): at 06:47

## 2017-11-05 RX ADMIN — Medication 81 MILLIGRAM(S): at 14:07

## 2017-11-05 RX ADMIN — Medication 1 DROP(S): at 06:57

## 2017-11-05 RX ADMIN — Medication 1: at 14:08

## 2017-11-05 RX ADMIN — PANTOPRAZOLE SODIUM 40 MILLIGRAM(S): 20 TABLET, DELAYED RELEASE ORAL at 06:47

## 2017-11-05 RX ADMIN — Medication 5 MILLIGRAM(S): at 06:46

## 2017-11-05 RX ADMIN — Medication 1: at 22:22

## 2017-11-05 RX ADMIN — CLOTRIMAZOLE AND BETAMETHASONE DIPROPIONATE 1 APPLICATION(S): 10; .5 CREAM TOPICAL at 17:19

## 2017-11-05 RX ADMIN — Medication 5 MILLIGRAM(S): at 17:18

## 2017-11-05 RX ADMIN — Medication 1 DROP(S): at 14:03

## 2017-11-05 RX ADMIN — Medication 1 DROP(S): at 00:15

## 2017-11-05 RX ADMIN — Medication 1 DROP(S): at 17:19

## 2017-11-05 RX ADMIN — TAMSULOSIN HYDROCHLORIDE 0.4 MILLIGRAM(S): 0.4 CAPSULE ORAL at 22:22

## 2017-11-05 RX ADMIN — Medication 1 APPLICATION(S): at 17:54

## 2017-11-05 NOTE — PROGRESS NOTE ADULT - PROBLEM SELECTOR PLAN 9
Had recent embolic CVA. Has been stable  - c/w ASA, Plavix, coumadin, statin Had recent embolic CVA during last hospital stay. Has been stable  - c/w ASA, Plavix, coumadin, statin

## 2017-11-05 NOTE — PROGRESS NOTE ADULT - SUBJECTIVE AND OBJECTIVE BOX
CC: hypotension @ dialysis    [employer] Medicine Intern: Oscar Lorenzo  Spectralink: [phone]  Pager: [phone] CC: hypotension @ dialysis    [employer] Medicine Intern: Oscar Lorenzo  Spectralink: [phone]  Pager: [phone]    HPI/INTERVAL HISTORY: Overnight, no acute events. Patient seen and examined at bedside this AM. Patient is nonverbal at baseline. However, patient appears to be in no acute distress and with no tenderness or pain on physical examination.    OBJECTIVE:  VITAL SIGNS:  ICU Vital Signs Last 24 Hrs  T(C): 36.7 (05 Nov 2017 04:10), Max: 36.7 (04 Nov 2017 21:25)  T(F): 98.1 (05 Nov 2017 04:10), Max: 98.1 (05 Nov 2017 04:10)  HR: 84 (05 Nov 2017 04:10) (81 - 103)  BP: 132/79 (05 Nov 2017 04:10) (120/75 - 133/76)  BP(mean): --  ABP: --  ABP(mean): --  RR: 18 (05 Nov 2017 04:10) (18 - 18)  SpO2: 100% (05 Nov 2017 04:10) (98% - 100%)        11-04 @ 08:01  -  11-05 @ 07:00  --------------------------------------------------------  IN: 360 mL / OUT: 1000 mL / NET: -640 mL      CAPILLARY BLOOD GLUCOSE      POCT Blood Glucose.: 187 mg/dL (05 Nov 2017 08:47)    PHYSICAL EXAM:  GENERAL: NAD, well-developed, sleeping comfortably in bed  HEAD:  Atraumatic, Normocephalic  EYES: conjunctiva and sclera clear  NECK: Supple, No JVD  CHEST/LUNG: Clear to auscultation bilaterally; No wheeze  HEART: Regular rate and rhythm; holosytolic murmur, rubs, or gallops  ABDOMEN: Soft, Nontender, Nondistended; Bowel sounds present  EXTREMITIES:  2+ Peripheral Pulses, No clubbing, cyanosis, or edema  PSYCH: awake and alert, not following commands  SKIN: sacral decubitus stage 3 ulcer present, permacath site in R anterior chest wall in place, small blister on lateral edge of R thumb     LABS:                        9.6    6.5   )-----------( 259      ( 04 Nov 2017 09:32 )             29.1     11-04    138  |  96  |  56<H>  ----------------------------<  144<H>  3.6   |  29  |  4.85<H>    Ca    9.3      04 Nov 2017 09:32  Phos  2.4     11-03  Mg     2.1     11-03        PT/INR - ( 04 Nov 2017 09:32 )   PT: 34.7 sec;   INR: 3.11 ratio         PTT - ( 04 Nov 2017 09:32 )  PTT:45.8 sec          RADIOLOGY & ADDITIONAL TESTS: Reviewed.    ALBUTerol    90 MICROgram(s) HFA Inhaler 2 Puff(s) Inhalation every 6 hours PRN  amiodarone    Tablet 200 milliGRAM(s) Oral daily  artificial  tears Solution 1 Drop(s) Both EYES four times a day  aspirin enteric coated 81 milliGRAM(s) Oral daily  atorvastatin 40 milliGRAM(s) Oral at bedtime  BACItracin   Ointment 1 Application(s) Topical two times a day  busPIRone 5 milliGRAM(s) Oral two times a day  clopidogrel Tablet 75 milliGRAM(s) Oral daily  clotrimazole/betamethasone Cream 1 Application(s) Topical two times a day  dextrose 5%. 1000 milliLiter(s) IV Continuous <Continuous>  dextrose 50% Injectable 12.5 Gram(s) IV Push once  dextrose 50% Injectable 25 Gram(s) IV Push once  dextrose 50% Injectable 25 Gram(s) IV Push once  dextrose Gel 1 Dose(s) Oral once PRN  epoetin georgie Injectable 58570 Unit(s) IV Push <User Schedule>  fluticasone propionate   110 MICROgram(s) HFA Inhaler 1 Puff(s) Inhalation daily  glucagon  Injectable 1 milliGRAM(s) IntraMuscular once PRN  insulin lispro (HumaLOG) corrective regimen sliding scale   SubCutaneous Before meals and at bedtime  Nephro-sophie 1 Tablet(s) Oral daily  pantoprazole   Suspension 40 milliGRAM(s) Oral before breakfast  tamsulosin 0.4 milliGRAM(s) Oral at bedtime      No Known Allergies

## 2017-11-05 NOTE — PROGRESS NOTE ADULT - ATTENDING COMMENTS
Afebrile, no distress, no HA, SOB  - Reviewed labs, blood c/s- GPC  - ID plan and Renal rec noted  c/w IV vancomycin 500mg post HD  - spoke to Rad yesterday, once INR <2 will remove the permcath and then will do repeat blood c/s  - HD per renal  - c/w all current meds  - DNR  - Dtr-Samantha knows the plan

## 2017-11-05 NOTE — PROGRESS NOTE ADULT - PROBLEM SELECTOR PLAN 7
-supratherapeutic INR yesterday; f/u repeat this AM and will hold coumadin for IR procedure (permacath removal)

## 2017-11-05 NOTE — PROGRESS NOTE ADULT - PROBLEM SELECTOR PLAN 2
ESRD on HD, consult called Dr. Elif Solis - HD T, Thu, Saturday via permcath. Spoke to Samantha-Dtr thtt AVF is necessary for placement in HD centers.   -Vascular sx consult for AVF. ESRD on HD, consult called Dr. Elif Solis - HD T, Thu, Saturday via permcath.   - Plan for removal of permacath after next dialysis session  -Vascular sx consult for AVF, plan for OR sometime next week

## 2017-11-05 NOTE — PROGRESS NOTE ADULT - PROBLEM SELECTOR PLAN 1
Afebrile, No SOB, repeat blood c/s growing GPC-11/2  - c/w IV Vanco 500mg post HD  - CT A/P negative, CXR negative  ( dialysis RN refusal to draw blood cultures due to hospital policy. Ordered for peripheral draw by phlebotomy when patient returns from dialysis. D/w RN.)  Called [facility]- Dr Hall ext [phone] to remove the permcath. Because of INR 3.1 he recommended to remove the permcath once INR <2.0. Renal aware Afebrile, No SOB, repeat blood culture (11/2) growing GPC  - c/w IV Vanco 500mg post HD  - plan for removal of permacath after next dialysis session (Tuesday) if INR <2  - CT A/P negative, CXR negative  - ID and Nephrology following, appreciate recommendations. Would like to give dose of Vanc after next dialysis session on Tues and hold Thursday dialysis session pending nephrology clearance

## 2017-11-06 LAB
ANION GAP SERPL CALC-SCNC: 20 MMOL/L — HIGH (ref 5–17)
APTT BLD: 36.4 SEC — SIGNIFICANT CHANGE UP (ref 27.5–37.4)
BUN SERPL-MCNC: 53 MG/DL — HIGH (ref 7–23)
CALCIUM SERPL-MCNC: 9.5 MG/DL — SIGNIFICANT CHANGE UP (ref 8.4–10.5)
CHLORIDE SERPL-SCNC: 94 MMOL/L — LOW (ref 96–108)
CO2 SERPL-SCNC: 23 MMOL/L — SIGNIFICANT CHANGE UP (ref 22–31)
CREAT SERPL-MCNC: 5.05 MG/DL — HIGH (ref 0.5–1.3)
GLUCOSE BLDC GLUCOMTR-MCNC: 117 MG/DL — HIGH (ref 70–99)
GLUCOSE BLDC GLUCOMTR-MCNC: 138 MG/DL — HIGH (ref 70–99)
GLUCOSE BLDC GLUCOMTR-MCNC: 154 MG/DL — HIGH (ref 70–99)
GLUCOSE BLDC GLUCOMTR-MCNC: 156 MG/DL — HIGH (ref 70–99)
GLUCOSE SERPL-MCNC: 147 MG/DL — HIGH (ref 70–99)
HCT VFR BLD CALC: 33.6 % — LOW (ref 39–50)
HGB BLD-MCNC: 10.9 G/DL — LOW (ref 13–17)
INR BLD: 2.12 RATIO — HIGH (ref 0.88–1.16)
MCHC RBC-ENTMCNC: 29.5 PG — SIGNIFICANT CHANGE UP (ref 27–34)
MCHC RBC-ENTMCNC: 32.3 GM/DL — SIGNIFICANT CHANGE UP (ref 32–36)
MCV RBC AUTO: 91.3 FL — SIGNIFICANT CHANGE UP (ref 80–100)
PLATELET # BLD AUTO: 288 K/UL — SIGNIFICANT CHANGE UP (ref 150–400)
POTASSIUM SERPL-MCNC: 3.9 MMOL/L — SIGNIFICANT CHANGE UP (ref 3.5–5.3)
POTASSIUM SERPL-SCNC: 3.9 MMOL/L — SIGNIFICANT CHANGE UP (ref 3.5–5.3)
PROTHROM AB SERPL-ACNC: 23.3 SEC — HIGH (ref 9.8–12.7)
RBC # BLD: 3.68 M/UL — LOW (ref 4.2–5.8)
RBC # FLD: 15.4 % — HIGH (ref 10.3–14.5)
SODIUM SERPL-SCNC: 137 MMOL/L — SIGNIFICANT CHANGE UP (ref 135–145)
WBC # BLD: 7 K/UL — SIGNIFICANT CHANGE UP (ref 3.8–10.5)
WBC # FLD AUTO: 7 K/UL — SIGNIFICANT CHANGE UP (ref 3.8–10.5)

## 2017-11-06 PROCEDURE — 99232 SBSQ HOSP IP/OBS MODERATE 35: CPT | Mod: GC

## 2017-11-06 PROCEDURE — 99233 SBSQ HOSP IP/OBS HIGH 50: CPT

## 2017-11-06 RX ORDER — VANCOMYCIN HCL 1 G
500 VIAL (EA) INTRAVENOUS ONCE
Qty: 0 | Refills: 0 | Status: COMPLETED | OUTPATIENT
Start: 2017-11-06 | End: 2017-11-06

## 2017-11-06 RX ORDER — ASPIRIN/CALCIUM CARB/MAGNESIUM 324 MG
81 TABLET ORAL DAILY
Qty: 0 | Refills: 0 | Status: DISCONTINUED | OUTPATIENT
Start: 2017-11-06 | End: 2017-11-16

## 2017-11-06 RX ORDER — VANCOMYCIN HCL 1 G
500 VIAL (EA) INTRAVENOUS ONCE
Qty: 0 | Refills: 0 | Status: COMPLETED | OUTPATIENT
Start: 2017-11-06 | End: 2018-10-05

## 2017-11-06 RX ADMIN — Medication 5 MILLIGRAM(S): at 06:15

## 2017-11-06 RX ADMIN — CLOTRIMAZOLE AND BETAMETHASONE DIPROPIONATE 1 APPLICATION(S): 10; .5 CREAM TOPICAL at 06:16

## 2017-11-06 RX ADMIN — Medication 1 TABLET(S): at 19:51

## 2017-11-06 RX ADMIN — Medication 1 DROP(S): at 22:17

## 2017-11-06 RX ADMIN — Medication 1 DROP(S): at 06:16

## 2017-11-06 RX ADMIN — Medication 1 DROP(S): at 13:13

## 2017-11-06 RX ADMIN — CLOPIDOGREL BISULFATE 75 MILLIGRAM(S): 75 TABLET, FILM COATED ORAL at 19:51

## 2017-11-06 RX ADMIN — PANTOPRAZOLE SODIUM 40 MILLIGRAM(S): 20 TABLET, DELAYED RELEASE ORAL at 06:16

## 2017-11-06 RX ADMIN — Medication 100 MILLIGRAM(S): at 23:07

## 2017-11-06 RX ADMIN — Medication 5 MILLIGRAM(S): at 19:51

## 2017-11-06 RX ADMIN — Medication 1: at 22:20

## 2017-11-06 RX ADMIN — Medication: at 13:13

## 2017-11-06 RX ADMIN — Medication 1 APPLICATION(S): at 06:16

## 2017-11-06 RX ADMIN — AMIODARONE HYDROCHLORIDE 200 MILLIGRAM(S): 400 TABLET ORAL at 06:16

## 2017-11-06 RX ADMIN — TAMSULOSIN HYDROCHLORIDE 0.4 MILLIGRAM(S): 0.4 CAPSULE ORAL at 22:18

## 2017-11-06 RX ADMIN — ATORVASTATIN CALCIUM 40 MILLIGRAM(S): 80 TABLET, FILM COATED ORAL at 22:17

## 2017-11-06 NOTE — PROGRESS NOTE ADULT - SUBJECTIVE AND OBJECTIVE BOX
73 yo M PMH CAD s/p 12-14 stents placed [id number], T2 DM. HTN HLD, CVA on coumadin and plavix, CKD  with tunneled HD catheter on  10/9/17 with positive blood culture on 10/21 and 11/2 and negative 11/4, ID Dr. Huntley and Neprology ( dr. Solis ) requesting removal of the tunneled catheter due to high suspicion for bacteriemia due to central line.  Also, d/w Dr. Teague ( IR )     Allergies: No Known Allergies      PAST MEDICAL & SURGICAL HISTORY:  CVA (cerebral vascular accident)  COPD (chronic obstructive pulmonary disease)  BPH (benign prostatic hyperplasia)  Bipolar affective disorder  CKD (chronic kidney disease)  Pancreatitis  Hypertension  Dyslipidemia  Diabetes mellitus  Coronary artery disease  HLD (hyperlipidemia)  Anemia  Acute renal failure  CKD (chronic kidney disease)  COPD (chronic obstructive pulmonary disease)  Fainting  Cardiac arrhythmia  Dizziness  Hydronephrosis  Cholecystitis  Bipolar 1 disorder  DM (diabetes mellitus)  HTN (hypertension)  Lumbar radiculopathy  Left heart failure  Reflux esophagitis  Coronary atherosclerosis  History of cardiac catheterization  No significant past surgical history        Pertinent labs:                      10.9   7.0   )-----------( 288      ( 06 Nov 2017 06:19 )             33.6   11-06    137  |  94<L>  |  53<H>  ----------------------------<  147<H>  3.9   |  23  |  5.05<H>    Ca    9.5      06 Nov 2017 06:19    PT/INR - ( 06 Nov 2017 06:19 )   PT: 23.3 sec;   INR: 2.12 ratio         PTT - ( 06 Nov 2017 06:19 )  PTT:36.4 sec    Consent: Procedure/risks/ Benefits explained. Informed consent obtained from daughter via telephone. Pt's daughter verbalizes understanding.

## 2017-11-06 NOTE — PROGRESS NOTE ADULT - SUBJECTIVE AND OBJECTIVE BOX
Patient is a 72y old  Male who presents with a chief complaint of s/p cardiac cath (24 Oct 2017 15:51)    Being followed by ID for bacteremia    Interval history:Repeat blood Cx after abx stopped again with CNS  For catheter removal      ROS:  Not obtainable    Antimicrobials:  vanco post HD restarted       Vital Signs Last 24 Hrs  T(C): 36.7 (11-06-17 @ 04:47), Max: 36.7 (11-06-17 @ 00:34)  T(F): 98.1 (11-06-17 @ 04:47), Max: 98.1 (11-06-17 @ 04:47)  HR: 88 (11-06-17 @ 04:47) (80 - 91)  BP: 153/90 (11-06-17 @ 04:47) (126/78 - 153/90)  BP(mean): --  RR: 18 (11-06-17 @ 04:47) (17 - 18)  SpO2: 100% (11-06-17 @ 04:47) (99% - 100%)    Physical Exam:        HEENT PERRLA ,No pallor or icterus    No oral exudate or erythema    Neck supple no JVD or LN    R SC catheter site no erythema tenderness or swelling    Chest Good AE,CTA    CVS RRR S1 S2 WNl No murmur or rub or gallop    Abd soft BS normal No tenderness no masses    Ext No cyanosis clubbing or edema    IV site no erythema tenderness or discharge    Joints no swelling or LOM    CNS no verbal response,does not follow commands    Lab Data:                          10.9   7.0   )-----------( 288      ( 06 Nov 2017 06:19 )             33.6       11-06    137  |  94<L>  |  53<H>  ----------------------------<  147<H>  3.9   |  23  |  5.05<H>    Ca    9.5      06 Nov 2017 06:19          Culture - Blood (collected 04 Nov 2017 22:37)  Source: .Blood Blood-Peripheral  Preliminary Report (05 Nov 2017 22:01):    No growth to date.    Culture - Blood (collected 04 Nov 2017 22:37)  Source: .Blood Blood  Preliminary Report (05 Nov 2017 22:01):    No growth to date.    Culture - Blood (collected 02 Nov 2017 18:07)  Source: .Blood Blood-Venous  Preliminary Report (03 Nov 2017 19:01):    No growth to date.    Culture - Blood (collected 02 Nov 2017 18:07)  Source: .Blood Blood-Peripheral  Gram Stain (04 Nov 2017 09:37):    Growth in anaerobic bottle: Gram Positive Cocci in Clusters  Final Report (05 Nov 2017 15:36):    Growth in anaerobic bottle: Coag Negative Staphylococcus    Single set isolate, possible contaminant. Contact    Microbiology if susceptibility testing clinically    indicated.

## 2017-11-06 NOTE — PROGRESS NOTE ADULT - PROBLEM SELECTOR PLAN 9
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 Has been stable  - c/w ASA, Plavix, coumadin, statin

## 2017-11-06 NOTE — PROGRESS NOTE ADULT - PROBLEM SELECTOR PLAN 1
Afebrile, No SOB, repeat blood culture (11/2) growing GPC, however, 11/4 cultures preliminary negative x2  - c/w IV Vanco 500mg post HD  - plan for removal of permacath after next dialysis session (Tuesday) if INR <2  - CT A/P negative, CXR negative  - ID and Nephrology following, appreciate recommendations. Would like to give dose of Vanc after next dialysis session on Tues and hold Thursday dialysis session pending nephrology clearance Afebrile, No SOB, repeat blood culture (11/2) growing GPC, however, 11/4 cultures preliminary negative x2. Patient will have permacath exchanged by IR today.  - c/w IV Vanco 500mg post HD  - CT A/P negative, CXR negative

## 2017-11-06 NOTE — PROGRESS NOTE ADULT - ATTENDING COMMENTS
Not in distress, lying down in HD, afebrile, no SOB  - Spoke to ID, Renal and IR, Blood c/s +ve for CN staph 11/2 but -ve on 11/4  - Plan is to remove Perm cath today after HD  - will do 1 set of blood c/s in am tomorrow  - c/w IV vancomycin 500mg after each HD, temp HD cath in few days per Renal  - c/w all other meds  - DNR  ** spoke to Samantha-Lalito

## 2017-11-06 NOTE — CHART NOTE - NSCHARTNOTEFT_GEN_A_CORE
Interventional Radiology post- permacath removal    Site was prepped and draped with chloro prep. Permacath removed w/o difficulty. Jugular and site pressure held for 10minutes. no active bleeding noted. Pt tolerated well. Please check site with vitals every 15min x1hr then every 30 min x1hr. Contact IR with any questions/ concerns at [phone].    Steph Raphael PA-C  Spectra #[phone]  IR #[id number]

## 2017-11-06 NOTE — PROGRESS NOTE ADULT - ASSESSMENT
73 yo with DM,ESRD recent HD,CVA ,non verbal.Recent bacteremia,pyelo s/p antimicrobia   hypotension during HD    Blood Cx-CNS and Bacillus-Initial Cx negative  But 10/24 blood Cx again with CNS  s/p RX  11/2 blood Cx still with CNS-though single set but persistent growth is suspicious for ?catheter related CNS BSI  Clinically stable  No fevers  Would consider catheter removal  Continue Vanco  Check repeat Cx  Tailor plan per course,results.  case d/w Dr Macias and IR  Will Follow.  Beeper [id number]-uocp/afterhours/No response-[id number]

## 2017-11-06 NOTE — PROGRESS NOTE ADULT - PROBLEM SELECTOR PLAN 2
ESRD on HD, nephrology on board Dr. Elif Solis - HD T, Thu, Saturday via permcath.   - Plan for removal of permacath after next dialysis session on Tuesday and skipping Thursday dialysis pending nephrology clearance  -Vascular sx consult for AVF, plan for OR sometime later this week ESRD on HD, nephrology on board Dr. Elif Solis - HD T, Thu, Saturday via permcath.   - Plan for removal of permacath after today's dialysis session (11/6)   - Vascular sx consult for AVF, plan for OR on Thursday

## 2017-11-06 NOTE — PROGRESS NOTE ADULT - SUBJECTIVE AND OBJECTIVE BOX
No distress, non-verbal    Vital Signs Last 24 Hrs  T(C): 36.7 (11-06-17 @ 13:30), Max: 36.7 (11-06-17 @ 00:34)  T(F): 98.1 (11-06-17 @ 13:30), Max: 98.1 (11-06-17 @ 04:47)  HR: 88 (11-06-17 @ 13:30) (80 - 90)  BP: 135/73 (11-06-17 @ 13:30) (133/82 - 153/90)  BP(mean): --  RR: 18 (11-06-17 @ 13:30) (18 - 18)  SpO2: 98% (11-06-17 @ 13:30) (98% - 100%)    Respiratory: b/l air entry, clear  Cardiovascular: S1 S2 regular  Gastrointestinal: soft, NT, BS present  Extremities: no edema     ALBUTerol    90 MICROgram(s) HFA Inhaler 2 Puff(s) Inhalation every 6 hours PRN  amiodarone    Tablet 200 milliGRAM(s) Oral daily  artificial  tears Solution 1 Drop(s) Both EYES four times a day  aspirin  chewable 81 milliGRAM(s) Oral daily  atorvastatin 40 milliGRAM(s) Oral at bedtime  BACItracin   Ointment 1 Application(s) Topical two times a day  busPIRone 5 milliGRAM(s) Oral two times a day  clopidogrel Tablet 75 milliGRAM(s) Oral daily  clotrimazole/betamethasone Cream 1 Application(s) Topical two times a day  dextrose 5%. 1000 milliLiter(s) IV Continuous <Continuous>  dextrose 50% Injectable 12.5 Gram(s) IV Push once  dextrose 50% Injectable 25 Gram(s) IV Push once  dextrose 50% Injectable 25 Gram(s) IV Push once  dextrose Gel 1 Dose(s) Oral once PRN  fluticasone propionate   110 MICROgram(s) HFA Inhaler 1 Puff(s) Inhalation daily  glucagon  Injectable 1 milliGRAM(s) IntraMuscular once PRN  insulin lispro (HumaLOG) corrective regimen sliding scale   SubCutaneous Before meals and at bedtime  Nephro-sophie 1 Tablet(s) Oral daily  pantoprazole   Suspension 40 milliGRAM(s) Oral before breakfast  tamsulosin 0.4 milliGRAM(s) Oral at bedtime                        10.9   7.0   )-----------( 288      ( 06 Nov 2017 06:19 )             33.6     06 Nov 2017 06:19    137    |  94     |  53     ----------------------------<  147    3.9     |  23     |  5.05     Ca    9.5        06 Nov 2017 06:19    Assessment and Recommendation:     ESRD on HD  Adm w/bacteremia, initially thought to be contam, but repeat bld cx still positive  S/p Vanco 3 x week per ID  Favor d/c perm Cath after HD today  Timing of next HD pending clinical course  Timing of new temp/perm HD cath pending clinical course and ID recommendations  Abx per ID  AVF planned for 11/9 as long as ok w/ID  D/w medical team  D/w IR, d/w vascular

## 2017-11-06 NOTE — PROGRESS NOTE ADULT - SUBJECTIVE AND OBJECTIVE BOX
TEAM 4 Medicine Intern: Oscar Lorenzo  Spectralink: [id number]  Pager: [phone]      Patient is a 72y old  Male who presents with a chief complaint of s/p cardiac cath (24 Oct 2017 15:51)      INTERVAL HPI/OVERNIGHT EVENTS:        REVIEW OF SYSTEMS:  CONSTITUTIONAL: No fever, weight loss, or fatigue  EYES: No eye pain, visual disturbances, or discharge  ENMT:  No difficulty hearing, tinnitus, vertigo; No sinus or throat pain  NECK: No pain or stiffness  BREASTS: No pain, masses, or nipple discharge  RESPIRATORY: No cough, wheezing, chills or hemoptysis; No shortness of breath  CARDIOVASCULAR: No chest pain, palpitations, dizziness, or leg swelling  GASTROINTESTINAL: No abdominal or epigastric pain. No nausea, vomiting, or hematemesis; No diarrhea or constipation. No melena or hematochezia.  GENITOURINARY: No dysuria, frequency, hematuria, or incontinence  NEUROLOGICAL: No headaches, memory loss, loss of strength, numbness, or tremors  SKIN: No itching, burning, rashes, or lesions   LYMPH NODES: No enlarged glands  ENDOCRINE: No heat or cold intolerance; No hair loss  MUSCULOSKELETAL: No joint pain or swelling; No muscle, back, or extremity pain  PSYCHIATRIC: No depression, anxiety, mood swings, or difficulty sleeping  HEME/LYMPH: No easy bruising, or bleeding gums  ALLERY AND IMMUNOLOGIC: No hives or eczema    T(C): 36.7 (11-06-17 @ 04:47), Max: 36.7 (11-06-17 @ 00:34)  HR: 88 (11-06-17 @ 04:47) (80 - 91)  BP: 153/90 (11-06-17 @ 04:47) (126/78 - 153/90)  RR: 18 (11-06-17 @ 04:47) (17 - 18)  SpO2: 100% (11-06-17 @ 04:47) (99% - 100%)  Wt(kg): --Vital Signs Last 24 Hrs  T(C): 36.7 (06 Nov 2017 04:47), Max: 36.7 (06 Nov 2017 00:34)  T(F): 98.1 (06 Nov 2017 04:47), Max: 98.1 (06 Nov 2017 04:47)  HR: 88 (06 Nov 2017 04:47) (80 - 91)  BP: 153/90 (06 Nov 2017 04:47) (126/78 - 153/90)  BP(mean): --  RR: 18 (06 Nov 2017 04:47) (17 - 18)  SpO2: 100% (06 Nov 2017 04:47) (99% - 100%)    PHYSICAL EXAM:  GENERAL: NAD, well-groomed, well-developed  HEAD:  Atraumatic, Normocephalic  EYES: EOMI, PERRLA, conjunctiva and sclera clear  ENMT: No tonsillar erythema, exudates, or enlargement; Moist mucous membranes, Good dentition, No lesions  NECK: Supple, No JVD, Normal thyroid  NERVOUS SYSTEM:  Alert & Oriented X3, Good concentration; Motor Strength 5/5 B/L upper and lower extremities; DTRs 2+ intact and symmetric  CHEST/LUNG: Clear to percussion bilaterally; No rales, rhonchi, wheezing, or rubs  HEART: Regular rate and rhythm; No murmurs, rubs, or gallops  ABDOMEN: Soft, Nontender, Nondistended; Bowel sounds present  EXTREMITIES:  2+ Peripheral Pulses, No clubbing, cyanosis, or edema  LYMPH: No lymphadenopathy noted  SKIN: No rashes or lesions    Consultant(s) Notes Reviewed:  [x ] YES  [ ] NO  Care Discussed with Consultants/Other Providers [ x] YES  [ ] NO    LABS:                        10.9   7.0   )-----------( 288      ( 06 Nov 2017 06:19 )             33.6     11-06    137  |  94<L>  |  53<H>  ----------------------------<  147<H>  3.9   |  23  |  5.05<H>    Ca    9.5      06 Nov 2017 06:19      PT/INR - ( 06 Nov 2017 06:19 )   PT: 23.3 sec;   INR: 2.12 ratio         PTT - ( 06 Nov 2017 06:19 )  PTT:36.4 sec    CAPILLARY BLOOD GLUCOSE      POCT Blood Glucose.: 169 mg/dL (05 Nov 2017 21:26)  POCT Blood Glucose.: 164 mg/dL (05 Nov 2017 17:56)  POCT Blood Glucose.: 176 mg/dL (05 Nov 2017 12:56)  POCT Blood Glucose.: 187 mg/dL (05 Nov 2017 08:47)            RADIOLOGY & ADDITIONAL TESTS:    Imaging Personally Reviewed:  [ ] YES  [ ] NO TEAM 4 Medicine Intern: Oscar Lorenzo  Spectralink: [phone]  Pager: [phone]      Patient is a 72y old  Male who presents with a chief complaint of s/p cardiac cath (24 Oct 2017 15:51)      INTERVAL HPI/OVERNIGHT EVENTS: Overnight, no acute events. Patient vital signs stable. Patient is non verbal with staff at baseline.     T(C): 36.7 (11-06-17 @ 04:47), Max: 36.7 (11-06-17 @ 00:34)  HR: 88 (11-06-17 @ 04:47) (80 - 91)  BP: 153/90 (11-06-17 @ 04:47) (126/78 - 153/90)  RR: 18 (11-06-17 @ 04:47) (17 - 18)  SpO2: 100% (11-06-17 @ 04:47) (99% - 100%)  Wt(kg): --Vital Signs Last 24 Hrs  T(C): 36.7 (06 Nov 2017 04:47), Max: 36.7 (06 Nov 2017 00:34)  T(F): 98.1 (06 Nov 2017 04:47), Max: 98.1 (06 Nov 2017 04:47)  HR: 88 (06 Nov 2017 04:47) (80 - 91)  BP: 153/90 (06 Nov 2017 04:47) (126/78 - 153/90)  BP(mean): --  RR: 18 (06 Nov 2017 04:47) (17 - 18)  SpO2: 100% (06 Nov 2017 04:47) (99% - 100%)    PHYSICAL EXAM:  GENERAL: NAD, well-groomed, well-developed  HEAD:  Atraumatic, Normocephalic  EYES: EOMI, PERRLA, conjunctiva and sclera clear  ENMT: No tonsillar erythema, exudates, or enlargement; Moist mucous membranes, Good dentition, No lesions  NECK: Supple, No JVD, Normal thyroid  NERVOUS SYSTEM:  Alert & Oriented X3, Good concentration; Motor Strength 5/5 B/L upper and lower extremities; DTRs 2+ intact and symmetric  CHEST/LUNG: Clear to percussion bilaterally; No rales, rhonchi, wheezing, or rubs  HEART: Regular rate and rhythm; No murmurs, rubs, or gallops  ABDOMEN: Soft, Nontender, Nondistended; Bowel sounds present  EXTREMITIES:  2+ Peripheral Pulses, No clubbing, cyanosis, or edema  LYMPH: No lymphadenopathy noted  SKIN: No rashes or lesions    Consultant(s) Notes Reviewed:  [x ] YES  [ ] NO  Care Discussed with Consultants/Other Providers [ x] YES  [ ] NO    LABS:                        10.9   7.0   )-----------( 288      ( 06 Nov 2017 06:19 )             33.6     11-06    137  |  94<L>  |  53<H>  ----------------------------<  147<H>  3.9   |  23  |  5.05<H>    Ca    9.5      06 Nov 2017 06:19      PT/INR - ( 06 Nov 2017 06:19 )   PT: 23.3 sec;   INR: 2.12 ratio         PTT - ( 06 Nov 2017 06:19 )  PTT:36.4 sec    CAPILLARY BLOOD GLUCOSE      POCT Blood Glucose.: 169 mg/dL (05 Nov 2017 21:26)  POCT Blood Glucose.: 164 mg/dL (05 Nov 2017 17:56)  POCT Blood Glucose.: 176 mg/dL (05 Nov 2017 12:56)  POCT Blood Glucose.: 187 mg/dL (05 Nov 2017 08:47)            RADIOLOGY & ADDITIONAL TESTS:    Imaging Personally Reviewed:  [ ] YES  [ ] NO TEAM 4 Medicine Intern: Oscar Lorenzo  Spectralink: [id number]  Pager: [phone]      Patient is a 72y old  Male who presents with a chief complaint of s/p cardiac cath (24 Oct 2017 15:51)      INTERVAL HPI/OVERNIGHT EVENTS: Overnight, no acute events. Patient vital signs stable. Blood cultures x2 from 11/4 preliminary read is negative so far. Plan is to remove permacath after dialysis once INR <2.0 after Tuesday dialysis session. Patient examined at bedside this AM. Patient is non verbal with staff at baseline.     T(C): 36.7 (11-06-17 @ 04:47), Max: 36.7 (11-06-17 @ 00:34)  HR: 88 (11-06-17 @ 04:47) (80 - 91)  BP: 153/90 (11-06-17 @ 04:47) (126/78 - 153/90)  RR: 18 (11-06-17 @ 04:47) (17 - 18)  SpO2: 100% (11-06-17 @ 04:47) (99% - 100%)  Wt(kg): --Vital Signs Last 24 Hrs  T(C): 36.7 (06 Nov 2017 04:47), Max: 36.7 (06 Nov 2017 00:34)  T(F): 98.1 (06 Nov 2017 04:47), Max: 98.1 (06 Nov 2017 04:47)  HR: 88 (06 Nov 2017 04:47) (80 - 91)  BP: 153/90 (06 Nov 2017 04:47) (126/78 - 153/90)  BP(mean): --  RR: 18 (06 Nov 2017 04:47) (17 - 18)  SpO2: 100% (06 Nov 2017 04:47) (99% - 100%)    PHYSICAL EXAM:  GENERAL: NAD, well-developed, sleeping comfortably in bed  HEAD:  Atraumatic, Normocephalic  EYES: conjunctiva and sclera clear  NECK: Supple, No JVD  CHEST/LUNG: Clear to auscultation bilaterally; No wheeze  HEART: Regular rate and rhythm; holosytolic murmur, rubs, or gallops  ABDOMEN: Soft, Nontender, Nondistended; Bowel sounds present  EXTREMITIES:  2+ Peripheral Pulses, No clubbing, cyanosis, or edema  PSYCH: awake and alert, not following commands  SKIN: sacral decubitus stage 3 ulcer present, permacath site in R anterior chest wall in place, small blister on lateral edge of R thumb     Consultant(s) Notes Reviewed:  [x ] YES  [ ] NO  Care Discussed with Consultants/Other Providers [ x] YES  [ ] NO    LABS:                        10.9   7.0   )-----------( 288      ( 06 Nov 2017 06:19 )             33.6     11-06    137  |  94<L>  |  53<H>  ----------------------------<  147<H>  3.9   |  23  |  5.05<H>    Ca    9.5      06 Nov 2017 06:19      PT/INR - ( 06 Nov 2017 06:19 )   PT: 23.3 sec;   INR: 2.12 ratio         PTT - ( 06 Nov 2017 06:19 )  PTT:36.4 sec    CAPILLARY BLOOD GLUCOSE      POCT Blood Glucose.: 169 mg/dL (05 Nov 2017 21:26)  POCT Blood Glucose.: 164 mg/dL (05 Nov 2017 17:56)  POCT Blood Glucose.: 176 mg/dL (05 Nov 2017 12:56)  POCT Blood Glucose.: 187 mg/dL (05 Nov 2017 08:47)            RADIOLOGY & ADDITIONAL TESTS:    Imaging Personally Reviewed:  [ ] YES  [ ] NO

## 2017-11-07 LAB
ANION GAP SERPL CALC-SCNC: 16 MMOL/L — SIGNIFICANT CHANGE UP (ref 5–17)
APTT BLD: 35.6 SEC — SIGNIFICANT CHANGE UP (ref 27.5–37.4)
BUN SERPL-MCNC: 31 MG/DL — HIGH (ref 7–23)
CALCIUM SERPL-MCNC: 9.6 MG/DL — SIGNIFICANT CHANGE UP (ref 8.4–10.5)
CHLORIDE SERPL-SCNC: 95 MMOL/L — LOW (ref 96–108)
CO2 SERPL-SCNC: 26 MMOL/L — SIGNIFICANT CHANGE UP (ref 22–31)
CREAT SERPL-MCNC: 3.37 MG/DL — HIGH (ref 0.5–1.3)
CULTURE RESULTS: SIGNIFICANT CHANGE UP
GLUCOSE BLDC GLUCOMTR-MCNC: 139 MG/DL — HIGH (ref 70–99)
GLUCOSE BLDC GLUCOMTR-MCNC: 143 MG/DL — HIGH (ref 70–99)
GLUCOSE BLDC GLUCOMTR-MCNC: 144 MG/DL — HIGH (ref 70–99)
GLUCOSE BLDC GLUCOMTR-MCNC: 218 MG/DL — HIGH (ref 70–99)
GLUCOSE SERPL-MCNC: 123 MG/DL — HIGH (ref 70–99)
HCT VFR BLD CALC: 37.3 % — LOW (ref 39–50)
HGB BLD-MCNC: 11.8 G/DL — LOW (ref 13–17)
INR BLD: 1.75 RATIO — HIGH (ref 0.88–1.16)
MAGNESIUM SERPL-MCNC: 2.2 MG/DL — SIGNIFICANT CHANGE UP (ref 1.6–2.6)
MCHC RBC-ENTMCNC: 29 PG — SIGNIFICANT CHANGE UP (ref 27–34)
MCHC RBC-ENTMCNC: 31.6 GM/DL — LOW (ref 32–36)
MCV RBC AUTO: 91.8 FL — SIGNIFICANT CHANGE UP (ref 80–100)
PHOSPHATE SERPL-MCNC: 3.6 MG/DL — SIGNIFICANT CHANGE UP (ref 2.5–4.5)
PLATELET # BLD AUTO: 296 K/UL — SIGNIFICANT CHANGE UP (ref 150–400)
POTASSIUM SERPL-MCNC: 4.9 MMOL/L — SIGNIFICANT CHANGE UP (ref 3.5–5.3)
POTASSIUM SERPL-SCNC: 4.9 MMOL/L — SIGNIFICANT CHANGE UP (ref 3.5–5.3)
PROTHROM AB SERPL-ACNC: 19.3 SEC — HIGH (ref 9.8–12.7)
RBC # BLD: 4.07 M/UL — LOW (ref 4.2–5.8)
RBC # FLD: 15.4 % — HIGH (ref 10.3–14.5)
SODIUM SERPL-SCNC: 137 MMOL/L — SIGNIFICANT CHANGE UP (ref 135–145)
SPECIMEN SOURCE: SIGNIFICANT CHANGE UP
WBC # BLD: 7.6 K/UL — SIGNIFICANT CHANGE UP (ref 3.8–10.5)
WBC # FLD AUTO: 7.6 K/UL — SIGNIFICANT CHANGE UP (ref 3.8–10.5)

## 2017-11-07 PROCEDURE — 99232 SBSQ HOSP IP/OBS MODERATE 35: CPT

## 2017-11-07 PROCEDURE — 93970 EXTREMITY STUDY: CPT | Mod: 26

## 2017-11-07 PROCEDURE — 99233 SBSQ HOSP IP/OBS HIGH 50: CPT

## 2017-11-07 RX ORDER — SODIUM POLYSTYRENE SULFONATE 4.1 MEQ/G
30 POWDER, FOR SUSPENSION ORAL ONCE
Qty: 0 | Refills: 0 | Status: DISCONTINUED | OUTPATIENT
Start: 2017-11-07 | End: 2017-11-07

## 2017-11-07 RX ADMIN — Medication 1 TABLET(S): at 12:46

## 2017-11-07 RX ADMIN — Medication 1 DROP(S): at 12:45

## 2017-11-07 RX ADMIN — Medication 1 PUFF(S): at 21:30

## 2017-11-07 RX ADMIN — TAMSULOSIN HYDROCHLORIDE 0.4 MILLIGRAM(S): 0.4 CAPSULE ORAL at 21:30

## 2017-11-07 RX ADMIN — Medication 2: at 21:36

## 2017-11-07 RX ADMIN — Medication 1 APPLICATION(S): at 17:55

## 2017-11-07 RX ADMIN — ATORVASTATIN CALCIUM 40 MILLIGRAM(S): 80 TABLET, FILM COATED ORAL at 21:30

## 2017-11-07 RX ADMIN — Medication 5 MILLIGRAM(S): at 17:54

## 2017-11-07 RX ADMIN — Medication 5 MILLIGRAM(S): at 06:12

## 2017-11-07 RX ADMIN — Medication 81 MILLIGRAM(S): at 12:46

## 2017-11-07 RX ADMIN — CLOTRIMAZOLE AND BETAMETHASONE DIPROPIONATE 1 APPLICATION(S): 10; .5 CREAM TOPICAL at 17:53

## 2017-11-07 RX ADMIN — AMIODARONE HYDROCHLORIDE 200 MILLIGRAM(S): 400 TABLET ORAL at 06:12

## 2017-11-07 RX ADMIN — Medication 1 APPLICATION(S): at 06:11

## 2017-11-07 RX ADMIN — PANTOPRAZOLE SODIUM 40 MILLIGRAM(S): 20 TABLET, DELAYED RELEASE ORAL at 06:14

## 2017-11-07 RX ADMIN — CLOPIDOGREL BISULFATE 75 MILLIGRAM(S): 75 TABLET, FILM COATED ORAL at 12:45

## 2017-11-07 RX ADMIN — Medication 1 DROP(S): at 06:12

## 2017-11-07 RX ADMIN — CLOTRIMAZOLE AND BETAMETHASONE DIPROPIONATE 1 APPLICATION(S): 10; .5 CREAM TOPICAL at 06:14

## 2017-11-07 RX ADMIN — Medication 1 DROP(S): at 17:53

## 2017-11-07 NOTE — PROGRESS NOTE ADULT - ASSESSMENT
72/M with T2DM, HTN, HLD, Embolic CVA with right hemiplegia, COPD, bipolar, bilateral nephrolithiasis and bladder stones, ASHD, discharged 10/18 after long hospital stay when initially presented with NSTEMI/pulm edema, UTI, VT s/p shock, embolic CVA, new AF, re-admitted after hypotensive episode in dialysis found with bacteremia x2/ with 2 different organisms likely contaminants.  However, repeat blood culture done off antibiotics grew COANS and was restarted on Vancomycin per ID. 72/M with T2DM, HTN, HLD, Embolic CVA with right hemiplegia, COPD, bipolar, bilateral nephrolithiasis and bladder stones, ASHD, discharged 10/18 after long hospital stay when initially presented with NSTEMI/pulm edema, UTI, VT s/p shock, embolic CVA, new AF, re-admitted after hypotensive episode in dialysis found with bacteremia x2/ with 2 different organisms likely contaminants.  However, repeat blood culture done off antibiotics grew COANS and was restarted on Vancomycin per ID

## 2017-11-07 NOTE — PROGRESS NOTE ADULT - PROBLEM SELECTOR PLAN 6
-will cont dysphagia 1 honey diet, aspiration precaution  -RN requests nutrition cs as patient's family concerned about nutrition. -will cont dysphagia 1 honey diet, aspiration precaution

## 2017-11-07 NOTE — PROGRESS NOTE ADULT - PROBLEM SELECTOR PLAN 7
- INR is >2 today. Will hold coumadin for possible IR permacath removal on Tuesday - holding coumadin in setting of possible procedures for short term dialysis access and AV fistula on Thursday

## 2017-11-07 NOTE — PROGRESS NOTE ADULT - SUBJECTIVE AND OBJECTIVE BOX
SUBJECTIVE: Pt seen, chart reviewed.  Pt w/ some improvement w/ motor function noted  Pt unable to offer complaints  no f/c/s, redness, warmth, odor    ROS skin see HPI  All other systems    aspirin  chewable 81 milliGRAM(s) Oral daily  clopidogrel Tablet 75 milliGRAM(s) Oral daily      Physical Exam:  Vital Signs Last 24 Hrs  T(C): 36.3 (07 Nov 2017 04:35), Max: 36.8 (06 Nov 2017 17:00)  T(F): 97.3 (07 Nov 2017 04:35), Max: 98.3 (06 Nov 2017 17:00)  HR: 70 (07 Nov 2017 04:35) (70 - 94)  BP: 124/75 (07 Nov 2017 04:35) (111/73 - 148/75)  BP(mean): --  RR: 18 (07 Nov 2017 04:35) (18 - 18)  SpO2: 99% (07 Nov 2017 04:35) (98% - 100%)    General Appearance:  NAD, A&Ox3,   Versa Care P500    Skin:  frail w/ decreased turgor, but good cap refill    Sacral probable Stage III pressure ulcer pale moist & granular w/ resolved DTI in wound  3cm x  1.5cm x 0.2cm    (+) scant serosanguinous drainage  No odor, erythema, increased warmth, tenderness, induration, fluctuance    LABS:                        11.8   7.6   )-----------( 296      ( 07 Nov 2017 06:41 )             37.3     PT/INR - ( 07 Nov 2017 06:41 )   PT: 19.3 sec;   INR: 1.75 ratio         PTT - ( 07 Nov 2017 06:41 )  PTT:35.6 sec

## 2017-11-07 NOTE — PROGRESS NOTE ADULT - PROBLEM SELECTOR PLAN 1
Afebrile, No SOB, repeat blood culture (11/2) growing GPC, however, 11/4 cultures preliminary negative x2. Patient will have permacath exchanged by IR today.  - c/w IV Vanco 500mg post HD  - CT A/P negative, CXR negative Afebrile, No SOB, repeat blood culture (11/2) growing GPC, however, 11/4 cultures  negative x2 thus far  - s/p permacath removal yesterday 11/6, repeat BCx x2 pending  - c/w IV Vanco 500mg post HD (s/p vancomycin post-HD for 10 days now)  - CT A/P negative, CXR negative

## 2017-11-07 NOTE — PROGRESS NOTE ADULT - SUBJECTIVE AND OBJECTIVE BOX
No distress, non-verbal    Vital Signs Last 24 Hrs  T(C): 36.3 (11-07-17 @ 04:35), Max: 36.8 (11-06-17 @ 17:00)  T(F): 97.3 (11-07-17 @ 04:35), Max: 98.3 (11-06-17 @ 17:00)  HR: 70 (11-07-17 @ 04:35) (70 - 94)  BP: 124/75 (11-07-17 @ 04:35) (111/73 - 148/75)  BP(mean): --  RR: 18 (11-07-17 @ 04:35) (18 - 18)  SpO2: 99% (11-07-17 @ 04:35) (98% - 100%)    Respiratory: b/l air entry, clear  Cardiovascular: S1 S2 regular  Gastrointestinal: soft, NT, BS present  Extremities: no edema     ALBUTerol    90 MICROgram(s) HFA Inhaler 2 Puff(s) Inhalation every 6 hours PRN  amiodarone    Tablet 200 milliGRAM(s) Oral daily  artificial  tears Solution 1 Drop(s) Both EYES four times a day  aspirin  chewable 81 milliGRAM(s) Oral daily  atorvastatin 40 milliGRAM(s) Oral at bedtime  BACItracin   Ointment 1 Application(s) Topical two times a day  busPIRone 5 milliGRAM(s) Oral two times a day  clopidogrel Tablet 75 milliGRAM(s) Oral daily  clotrimazole/betamethasone Cream 1 Application(s) Topical two times a day  dextrose 5%. 1000 milliLiter(s) IV Continuous <Continuous>  dextrose 50% Injectable 12.5 Gram(s) IV Push once  dextrose 50% Injectable 25 Gram(s) IV Push once  dextrose 50% Injectable 25 Gram(s) IV Push once  dextrose Gel 1 Dose(s) Oral once PRN  fluticasone propionate   110 MICROgram(s) HFA Inhaler 1 Puff(s) Inhalation daily  glucagon  Injectable 1 milliGRAM(s) IntraMuscular once PRN  insulin lispro (HumaLOG) corrective regimen sliding scale   SubCutaneous Before meals and at bedtime  Nephro-sophie 1 Tablet(s) Oral daily  pantoprazole   Suspension 40 milliGRAM(s) Oral before breakfast  tamsulosin 0.4 milliGRAM(s) Oral at bedtime                          11.8   7.6   )-----------( 296      ( 07 Nov 2017 06:41 )             37.3     07 Nov 2017 06:41    137    |  95     |  31     ----------------------------<  123    4.9     |  26     |  3.37     Ca    9.6        07 Nov 2017 06:41  Phos  3.6       07 Nov 2017 06:41  Mg     2.2       07 Nov 2017 06:41    Assessment and Recommendation:     ESRD on HD  Adm w/bacteremia, initially thought to be contam, but repeat bld cx on 10/24 and 11/2 positive for CNS  Vanco 3 x week per ID  Perm Cath d/c-d after HD 11/6  Timing of next HD pending clinical course  Daily BMP  New perm cath in next few days, pls clear w/ID  AVF scheduled for 11/9 as long as ID clears  D/w IR and vascular

## 2017-11-07 NOTE — PROGRESS NOTE ADULT - ASSESSMENT
71 yo with DM,ESRD recent HD,CVA ,non verbal.Recent bacteremia,pyelo s/p antimicrobia   hypotension during HD    Blood Cx-CNS and Bacillus-Initial Cx negative  But 10/24 blood Cx again with CNS  s/p RX  11/2 blood Cx still with CNS-though single set but persistent growth is suspicious for ?catheter related CNS BSI  s/p catheter removal  Clinically stable  No fevers  Continue Vanco post HD   Check repeat blood Cx  Tailor plan per course,results.    Will Follow.  Beeper [id number]-albb/afterhours/No response-[id number]

## 2017-11-07 NOTE — PROGRESS NOTE ADULT - PROBLEM SELECTOR PLAN 9
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 Has been stable  - c/w ASA, Plavix, coumadin, statin Had recent embolic CVA during last hospital stay. Has been stable  - c/w ASA, Plavix, statin  - holding coumadin in setting of possible procedures for short term dialysis access and AV fistula on Thursday

## 2017-11-07 NOTE — PROGRESS NOTE ADULT - ASSESSMENT
A/P:  Sacral pressure ulcer Probable stage III w/ evolving DTI in center  TRIAD cream w/ pericare    con't Nutrition (as tolerated)  con't Offloading   con't Pericare  Care as per medicine will follow w/ you  Upon discharge f/u as outpatient at [facility] [address] [facility] [phone]  Seen w/ attng and D/w team  Kneisha Chicas PA-C CWS [id number]  I spent 15  minutes w/ this pt of which more than 50% of the time was spent counseling & coordinating care of this pt.

## 2017-11-07 NOTE — PROGRESS NOTE ADULT - SUBJECTIVE AND OBJECTIVE BOX
TEAM 4 Medicine Intern: Oscar Lorenzo  Spectralink: [id number]  Pager: [phone]      Patient is a 72y old  Male who presents with a chief complaint of s/p cardiac cath (24 Oct 2017 15:51)      INTERVAL HPI/OVERNIGHT EVENTS:        REVIEW OF SYSTEMS:  CONSTITUTIONAL: No fever, weight loss, or fatigue  EYES: No eye pain, visual disturbances, or discharge  ENMT:  No difficulty hearing, tinnitus, vertigo; No sinus or throat pain  NECK: No pain or stiffness  BREASTS: No pain, masses, or nipple discharge  RESPIRATORY: No cough, wheezing, chills or hemoptysis; No shortness of breath  CARDIOVASCULAR: No chest pain, palpitations, dizziness, or leg swelling  GASTROINTESTINAL: No abdominal or epigastric pain. No nausea, vomiting, or hematemesis; No diarrhea or constipation. No melena or hematochezia.  GENITOURINARY: No dysuria, frequency, hematuria, or incontinence  NEUROLOGICAL: No headaches, memory loss, loss of strength, numbness, or tremors  SKIN: No itching, burning, rashes, or lesions   LYMPH NODES: No enlarged glands  ENDOCRINE: No heat or cold intolerance; No hair loss  MUSCULOSKELETAL: No joint pain or swelling; No muscle, back, or extremity pain  PSYCHIATRIC: No depression, anxiety, mood swings, or difficulty sleeping  HEME/LYMPH: No easy bruising, or bleeding gums  ALLERY AND IMMUNOLOGIC: No hives or eczema    T(C): 36.3 (11-07-17 @ 04:35), Max: 36.8 (11-06-17 @ 17:00)  HR: 70 (11-07-17 @ 04:35) (70 - 94)  BP: 124/75 (11-07-17 @ 04:35) (111/73 - 148/75)  RR: 18 (11-07-17 @ 04:35) (18 - 18)  SpO2: 99% (11-07-17 @ 04:35) (98% - 100%)  Wt(kg): --Vital Signs Last 24 Hrs  T(C): 36.3 (07 Nov 2017 04:35), Max: 36.8 (06 Nov 2017 17:00)  T(F): 97.3 (07 Nov 2017 04:35), Max: 98.3 (06 Nov 2017 17:00)  HR: 70 (07 Nov 2017 04:35) (70 - 94)  BP: 124/75 (07 Nov 2017 04:35) (111/73 - 148/75)  BP(mean): --  RR: 18 (07 Nov 2017 04:35) (18 - 18)  SpO2: 99% (07 Nov 2017 04:35) (98% - 100%)    PHYSICAL EXAM:  GENERAL: NAD, well-groomed, well-developed  HEAD:  Atraumatic, Normocephalic  EYES: EOMI, PERRLA, conjunctiva and sclera clear  ENMT: No tonsillar erythema, exudates, or enlargement; Moist mucous membranes, Good dentition, No lesions  NECK: Supple, No JVD, Normal thyroid  NERVOUS SYSTEM:  Alert & Oriented X3, Good concentration; Motor Strength 5/5 B/L upper and lower extremities; DTRs 2+ intact and symmetric  CHEST/LUNG: Clear to percussion bilaterally; No rales, rhonchi, wheezing, or rubs  HEART: Regular rate and rhythm; No murmurs, rubs, or gallops  ABDOMEN: Soft, Nontender, Nondistended; Bowel sounds present  EXTREMITIES:  2+ Peripheral Pulses, No clubbing, cyanosis, or edema  LYMPH: No lymphadenopathy noted  SKIN: No rashes or lesions    Consultant(s) Notes Reviewed:  [x ] YES  [ ] NO  Care Discussed with Consultants/Other Providers [ x] YES  [ ] NO    LABS:                        11.8   7.6   )-----------( 296      ( 07 Nov 2017 06:41 )             37.3     11-06    137  |  94<L>  |  53<H>  ----------------------------<  147<H>  3.9   |  23  |  5.05<H>    Ca    9.5      06 Nov 2017 06:19      PT/INR - ( 07 Nov 2017 06:41 )   PT: 19.3 sec;   INR: 1.75 ratio         PTT - ( 07 Nov 2017 06:41 )  PTT:35.6 sec    CAPILLARY BLOOD GLUCOSE      POCT Blood Glucose.: 154 mg/dL (06 Nov 2017 22:00)  POCT Blood Glucose.: 117 mg/dL (06 Nov 2017 19:08)  POCT Blood Glucose.: 156 mg/dL (06 Nov 2017 12:54)  POCT Blood Glucose.: 138 mg/dL (06 Nov 2017 09:09)            RADIOLOGY & ADDITIONAL TESTS:    Imaging Personally Reviewed:  [ ] YES  [ ] NO TEAM 4 Medicine Intern: Oscar Lorenzo  Spectralink: [id number]  Pager: [phone]      Patient is a 72y old  Male who presents with a chief complaint of s/p cardiac cath (24 Oct 2017 15:51)      INTERVAL HPI/OVERNIGHT EVENTS: Overnight, no acute events. Yesterday, patient had dialysis sessions after which vancomycin was given and permacath was removed. Will need to touch base with ID and Nephrology regarding plan for next dialysis session and short term dialysis access. Patient is scheduled for OR with vascular surgery on Thursday for AV fistula. This AM patient lying in bed with no distress. Nonverbal with staff at baseline.    T(C): 36.3 (11-07-17 @ 04:35), Max: 36.8 (11-06-17 @ 17:00)  HR: 70 (11-07-17 @ 04:35) (70 - 94)  BP: 124/75 (11-07-17 @ 04:35) (111/73 - 148/75)  RR: 18 (11-07-17 @ 04:35) (18 - 18)  SpO2: 99% (11-07-17 @ 04:35) (98% - 100%)  Wt(kg): --Vital Signs Last 24 Hrs  T(C): 36.3 (07 Nov 2017 04:35), Max: 36.8 (06 Nov 2017 17:00)  T(F): 97.3 (07 Nov 2017 04:35), Max: 98.3 (06 Nov 2017 17:00)  HR: 70 (07 Nov 2017 04:35) (70 - 94)  BP: 124/75 (07 Nov 2017 04:35) (111/73 - 148/75)  BP(mean): --  RR: 18 (07 Nov 2017 04:35) (18 - 18)  SpO2: 99% (07 Nov 2017 04:35) (98% - 100%)    PHYSICAL EXAM:  GENERAL: NAD, well-developed, sleeping comfortably in bed  HEAD:  Atraumatic, Normocephalic  EYES: conjunctiva and sclera clear  NECK: Supple, No JVD  CHEST/LUNG: Clear to auscultation bilaterally; No wheeze, permacath removal site taped and draped no sign of infection/bleeding/erythema  HEART: Regular rate and rhythm; holosytolic murmur, rubs, or gallops  ABDOMEN: Soft, Nontender, Nondistended; Bowel sounds present  EXTREMITIES:  2+ Peripheral Pulses, No clubbing, cyanosis, or edema  PSYCH: awake and alert, not following commands  SKIN: sacral decubitus stage 3 ulcer present, permacath site in R anterior chest wall in place, small blister on lateral edge of R thumb     Consultant(s) Notes Reviewed:  [x ] YES  [ ] NO  Care Discussed with Consultants/Other Providers [ x] YES  [ ] NO    LABS:                        11.8   7.6   )-----------( 296      ( 07 Nov 2017 06:41 )             37.3     11-06    137  |  94<L>  |  53<H>  ----------------------------<  147<H>  3.9   |  23  |  5.05<H>    Ca    9.5      06 Nov 2017 06:19      PT/INR - ( 07 Nov 2017 06:41 )   PT: 19.3 sec;   INR: 1.75 ratio         PTT - ( 07 Nov 2017 06:41 )  PTT:35.6 sec    CAPILLARY BLOOD GLUCOSE      POCT Blood Glucose.: 154 mg/dL (06 Nov 2017 22:00)  POCT Blood Glucose.: 117 mg/dL (06 Nov 2017 19:08)  POCT Blood Glucose.: 156 mg/dL (06 Nov 2017 12:54)  POCT Blood Glucose.: 138 mg/dL (06 Nov 2017 09:09)            RADIOLOGY & ADDITIONAL TESTS:    Imaging Personally Reviewed:  [ ] YES  [ ] NO

## 2017-11-07 NOTE — PROGRESS NOTE ADULT - ATTENDING COMMENTS
Calm, resting comfortably, afebrile, VSS  - will arrange permcath in 2 days by IR, spoke to Renal  -ID wants to c/w vanco 500mg post HD, vanco traugh level tomorrow  - Held coumadin for permcath in 2 days  - c/w asa, plavix, statin, amiodarone

## 2017-11-07 NOTE — PROGRESS NOTE ADULT - PROBLEM SELECTOR PLAN 2
ESRD on HD, nephrology on board Dr. Elif Solis - HD T, Thu, Saturday via permcath.   - Plan for removal of permacath after today's dialysis session (11/6)   - Vascular sx consult for AVF, plan for OR on Thursday ESRD on HD, nephrology on board Dr. Elif Solis - HD T, Thu, Saturday via permcath.   - s/p permacath removal yesterday 11/6  - will need to discuss next dialysis session and short term dialysis access with nephrology and ID  - Vascular sx consult for AVF, plan for OR on Thursday

## 2017-11-07 NOTE — CHART NOTE - NSCHARTNOTEFT_GEN_A_CORE
Vascular Surgery    Consulted for AVF creation.     Vein mapping being performed today. Pt had permcath removed yesterday for persistently positive blood cultures. New blood cultures were drawn today and are still pending.     Discussed with Dr. Huntley from ID, he would recommend waiting for negative blood cultures before performing AVF, especially if there's a chance a foreign body may be implanted (graft).     - Will plan for AVF creation on NEXT Thursday (11/16/17)  - Will continue to follow.   - D/W Dr. Saez.    Beth Duarte   [id number]

## 2017-11-07 NOTE — PROGRESS NOTE ADULT - PROBLEM SELECTOR PLAN 8
-Patient with severe debility due to CVA, dementia - supportive care  -Patient is DNR - Patient with severe debility due to CVA, dementia - supportive care  - Patient is DNR

## 2017-11-07 NOTE — PROGRESS NOTE ADULT - SUBJECTIVE AND OBJECTIVE BOX
Patient is a 72y old  Male who presents with a chief complaint of s/p cardiac cath (24 Oct 2017 15:51)    Being followed by ID for bacteremia    Interval history:s/p permacath removal after dialysis  No other acute events      ROS:  Non verbal    Antimicrobials:    vanco post HD    Vital Signs Last 24 Hrs  T(C): 36.3 (11-07-17 @ 04:35), Max: 36.8 (11-06-17 @ 17:00)  T(F): 97.3 (11-07-17 @ 04:35), Max: 98.3 (11-06-17 @ 17:00)  HR: 70 (11-07-17 @ 04:35) (70 - 94)  BP: 124/75 (11-07-17 @ 04:35) (111/73 - 148/75)  BP(mean): --  RR: 18 (11-07-17 @ 04:35) (18 - 18)  SpO2: 99% (11-07-17 @ 04:35) (98% - 100%)    Physical Exam:    Constitutional comfortable,    HEENT PERRLA EOMI,No pallor or icterus    No oral exudate or erythema    Neck supple no JVD or LN    R SC removed permactah site -no erythema tenderness or discharge     Chest Good AE,CTA    CVS RRR S1 S2 WNl No murmur or rub or gallop    Abd soft BS normal No tenderness no masses    Ext No cyanosis clubbing or edema    IV site no erythema tenderness or discharge    Joints no swelling or LOM    CNS Non verbal -does not follow commands    Lab Data:                          11.8   7.6   )-----------( 296      ( 07 Nov 2017 06:41 )             37.3       11-07    137  |  95<L>  |  31<H>  ----------------------------<  123<H>  4.9   |  26  |  3.37<H>    Ca    9.6      07 Nov 2017 06:41  Phos  3.6     11-07  Mg     2.2     11-07          Culture - Blood (collected 04 Nov 2017 22:37)  Source: .Blood Blood-Peripheral  Preliminary Report (05 Nov 2017 22:01):    No growth to date.    Culture - Blood (collected 04 Nov 2017 22:37)  Source: .Blood Blood  Preliminary Report (05 Nov 2017 22:01):    No growth to date.    Culture - Blood (collected 02 Nov 2017 18:07)  Source: .Blood Blood-Venous  Preliminary Report (03 Nov 2017 19:01):    No growth to date.    Culture - Blood (collected 02 Nov 2017 18:07)  Source: .Blood Blood-Peripheral  Gram Stain (04 Nov 2017 09:37):    Growth in anaerobic bottle: Gram Positive Cocci in Clusters  Final Report (05 Nov 2017 15:36):    Growth in anaerobic bottle: Coag Negative Staphylococcus    Single set isolate, possible contaminant. Contact    Microbiology if susceptibility testing clinically    indicated.

## 2017-11-08 LAB
ANION GAP SERPL CALC-SCNC: 18 MMOL/L — HIGH (ref 5–17)
APTT BLD: 34.3 SEC — SIGNIFICANT CHANGE UP (ref 27.5–37.4)
BUN SERPL-MCNC: 54 MG/DL — HIGH (ref 7–23)
CALCIUM SERPL-MCNC: 9.9 MG/DL — SIGNIFICANT CHANGE UP (ref 8.4–10.5)
CHLORIDE SERPL-SCNC: 96 MMOL/L — SIGNIFICANT CHANGE UP (ref 96–108)
CO2 SERPL-SCNC: 23 MMOL/L — SIGNIFICANT CHANGE UP (ref 22–31)
CREAT SERPL-MCNC: 4.91 MG/DL — HIGH (ref 0.5–1.3)
CULTURE RESULTS: SIGNIFICANT CHANGE UP
GLUCOSE BLDC GLUCOMTR-MCNC: 140 MG/DL — HIGH (ref 70–99)
GLUCOSE BLDC GLUCOMTR-MCNC: 170 MG/DL — HIGH (ref 70–99)
GLUCOSE BLDC GLUCOMTR-MCNC: 174 MG/DL — HIGH (ref 70–99)
GLUCOSE BLDC GLUCOMTR-MCNC: 196 MG/DL — HIGH (ref 70–99)
GLUCOSE SERPL-MCNC: 139 MG/DL — HIGH (ref 70–99)
HCT VFR BLD CALC: 36.8 % — LOW (ref 39–50)
HGB BLD-MCNC: 12 G/DL — LOW (ref 13–17)
INR BLD: 1.57 RATIO — HIGH (ref 0.88–1.16)
MCHC RBC-ENTMCNC: 29.8 PG — SIGNIFICANT CHANGE UP (ref 27–34)
MCHC RBC-ENTMCNC: 32.6 GM/DL — SIGNIFICANT CHANGE UP (ref 32–36)
MCV RBC AUTO: 91.6 FL — SIGNIFICANT CHANGE UP (ref 80–100)
PLATELET # BLD AUTO: 289 K/UL — SIGNIFICANT CHANGE UP (ref 150–400)
POTASSIUM SERPL-MCNC: 4.8 MMOL/L — SIGNIFICANT CHANGE UP (ref 3.5–5.3)
POTASSIUM SERPL-SCNC: 4.8 MMOL/L — SIGNIFICANT CHANGE UP (ref 3.5–5.3)
PROTHROM AB SERPL-ACNC: 17.1 SEC — HIGH (ref 9.8–12.7)
RBC # BLD: 4.02 M/UL — LOW (ref 4.2–5.8)
RBC # FLD: 15.1 % — HIGH (ref 10.3–14.5)
SODIUM SERPL-SCNC: 137 MMOL/L — SIGNIFICANT CHANGE UP (ref 135–145)
SPECIMEN SOURCE: SIGNIFICANT CHANGE UP
WBC # BLD: 6.4 K/UL — SIGNIFICANT CHANGE UP (ref 3.8–10.5)
WBC # FLD AUTO: 6.4 K/UL — SIGNIFICANT CHANGE UP (ref 3.8–10.5)

## 2017-11-08 PROCEDURE — 99233 SBSQ HOSP IP/OBS HIGH 50: CPT

## 2017-11-08 PROCEDURE — 99232 SBSQ HOSP IP/OBS MODERATE 35: CPT

## 2017-11-08 RX ADMIN — Medication 1 TABLET(S): at 11:52

## 2017-11-08 RX ADMIN — AMIODARONE HYDROCHLORIDE 200 MILLIGRAM(S): 400 TABLET ORAL at 06:20

## 2017-11-08 RX ADMIN — Medication 81 MILLIGRAM(S): at 11:53

## 2017-11-08 RX ADMIN — CLOTRIMAZOLE AND BETAMETHASONE DIPROPIONATE 1 APPLICATION(S): 10; .5 CREAM TOPICAL at 17:37

## 2017-11-08 RX ADMIN — TAMSULOSIN HYDROCHLORIDE 0.4 MILLIGRAM(S): 0.4 CAPSULE ORAL at 22:26

## 2017-11-08 RX ADMIN — Medication 1 APPLICATION(S): at 17:36

## 2017-11-08 RX ADMIN — CLOTRIMAZOLE AND BETAMETHASONE DIPROPIONATE 1 APPLICATION(S): 10; .5 CREAM TOPICAL at 06:21

## 2017-11-08 RX ADMIN — Medication 1 DROP(S): at 06:21

## 2017-11-08 RX ADMIN — Medication 1 DROP(S): at 00:15

## 2017-11-08 RX ADMIN — Medication 5 MILLIGRAM(S): at 17:37

## 2017-11-08 RX ADMIN — Medication 1 DROP(S): at 11:52

## 2017-11-08 RX ADMIN — Medication 1: at 22:26

## 2017-11-08 RX ADMIN — Medication 1: at 09:23

## 2017-11-08 RX ADMIN — ATORVASTATIN CALCIUM 40 MILLIGRAM(S): 80 TABLET, FILM COATED ORAL at 22:26

## 2017-11-08 RX ADMIN — Medication 1 PUFF(S): at 17:37

## 2017-11-08 RX ADMIN — CLOPIDOGREL BISULFATE 75 MILLIGRAM(S): 75 TABLET, FILM COATED ORAL at 11:53

## 2017-11-08 RX ADMIN — Medication 1 DROP(S): at 17:36

## 2017-11-08 RX ADMIN — Medication 5 MILLIGRAM(S): at 06:20

## 2017-11-08 RX ADMIN — Medication 1: at 13:07

## 2017-11-08 RX ADMIN — Medication 1 APPLICATION(S): at 06:21

## 2017-11-08 RX ADMIN — PANTOPRAZOLE SODIUM 40 MILLIGRAM(S): 20 TABLET, DELAYED RELEASE ORAL at 06:21

## 2017-11-08 NOTE — PROGRESS NOTE ADULT - ASSESSMENT
71 yo with DM,ESRD recent HD,CVA ,non verbal.Recent bacteremia,pyelo s/p antimicrobia   hypotension during HD    Blood Cx-CNS and Bacillus-Initial Cx negative  But 10/24 blood Cx again with CNS  s/p RX  11/2 blood Cx still with CNS-though single set but persistent growth  suspicious for ?catheter related CNS BSI  s/p catheter removal  Clinically stable  No fevers  Continue Vanco post HD   repeat Cx so far negative   If stay negative at 72 hrs no ID contraindication to permacath    Tailor plan per course,results.  case d/w primary team     Will Follow.  Beeper [id number]-saku/afterhours/No response-[id number]

## 2017-11-08 NOTE — PROGRESS NOTE ADULT - ASSESSMENT
72/M with T2DM, HTN, HLD, Embolic CVA with right hemiplegia, COPD, bipolar, bilateral nephrolithiasis and bladder stones, ASHD, discharged 10/18 after long hospital stay when initially presented with NSTEMI/pulm edema, UTI, VT s/p shock, embolic CVA, new AF, re-admitted after hypotensive episode in dialysis found with bacteremia x2/ with 2 different organisms likely contaminants.  However, repeat blood culture done off antibiotics grew COANS and was restarted on Vancomycin per ID

## 2017-11-08 NOTE — PROGRESS NOTE ADULT - ATTENDING COMMENTS
Afebrile, VSS, calm, no acute distress  - Reviewed labs, imaging, blood c/s neg so far  - will arrange permcath by IR tomorrow if blood c/s remains neg growth, ABF next week on Thursday  - Spoke to ID, Renal  - c/w current meds  ** Dtr knows the plan

## 2017-11-08 NOTE — PROGRESS NOTE ADULT - PROBLEM SELECTOR PLAN 9
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 Has been stable  - c/w ASA, Plavix, statin  - holding coumadin in setting of possible procedures for short term dialysis access and AV fistula on Thursday

## 2017-11-08 NOTE — PROGRESS NOTE ADULT - PROBLEM SELECTOR PLAN 1
Afebrile, No SOB, repeat blood culture (11/2) growing GPC, however, 11/4 cultures  negative x2. Awaiting cultures from 11/7. If negative, will proceed with permacath.  - will check random vancomycin level tomorrow in the AM  - s/p permacath removal 11/6, repeat BCx x2 from 11/7 pending  - c/w IV Vanco 500mg post HD (s/p vancomycin post-HD for 10 days now)  - CT A/P negative, CXR negative

## 2017-11-08 NOTE — PROGRESS NOTE ADULT - SUBJECTIVE AND OBJECTIVE BOX
TE[employer] 4 Medicine Intern: Oscar Lorenzo  Spectralink: [phone]  Pager: [phone]      Patient is a 72y old  Male who presents with a chief complaint of s/p cardiac cath (24 Oct 2017 15:51)      INTERVAL HPI/OVERNIGHT EVENTS: Overnight, no acute events. Discussed with ID regarding placement of permacath, would like to wait 48-72 hours for cultures from 11/7 to result before proceeding. If these cultures are negative, then it would be okay to proceed with permacath, however, if they are negative will need to consider Shiley. Spoke with Dr. Solis yesterday regarding next dialysis session, would like to do this on Thursday or Friday. Patient is nonverbal with staff at baseline.      T(C): 36.6 (11-08-17 @ 04:10), Max: 36.7 (11-07-17 @ 20:49)  HR: 88 (11-08-17 @ 04:10) (70 - 92)  BP: 107/67 (11-08-17 @ 04:10) (107/67 - 137/76)  RR: 178 (11-08-17 @ 04:10) (17 - 178)  SpO2: 99% (11-08-17 @ 04:10) (98% - 99%)  Wt(kg): --Vital Signs Last 24 Hrs  T(C): 36.6 (08 Nov 2017 04:10), Max: 36.7 (07 Nov 2017 20:49)  T(F): 97.9 (08 Nov 2017 04:10), Max: 98 (07 Nov 2017 20:49)  HR: 88 (08 Nov 2017 04:10) (70 - 92)  BP: 107/67 (08 Nov 2017 04:10) (107/67 - 137/76)  BP(mean): --  RR: 178 (08 Nov 2017 04:10) (17 - 178)  SpO2: 99% (08 Nov 2017 04:10) (98% - 99%)    PHYSICAL EXAM:  GENERAL: NAD, well-developed, sleeping comfortably in bed  HEAD:  Atraumatic, Normocephalic  EYES: conjunctiva and sclera clear  NECK: Supple, No JVD  CHEST/LUNG: Clear to auscultation bilaterally; No wheeze, permacath removal site taped and draped no sign of infection/bleeding/erythema  HEART: Regular rate and rhythm; holosytolic murmur, rubs, or gallops  ABDOMEN: Soft, Nontender, Nondistended; Bowel sounds present  EXTREMITIES:  2+ Peripheral Pulses, No clubbing, cyanosis, or edema  PSYCH: awake and alert, not following commands  SKIN: sacral decubitus stage 3 ulcer present, permacath site in R anterior chest wall in place, small blister on lateral edge of R thumb     Consultant(s) Notes Reviewed:  [x ] YES  [ ] NO  Care Discussed with Consultants/Other Providers [ x] YES  [ ] NO    LABS:                        12.0   6.4   )-----------( 289      ( 08 Nov 2017 07:08 )             36.8     11-08    137  |  96  |  54<H>  ----------------------------<  139<H>  4.8   |  23  |  4.91<H>    Ca    9.9      08 Nov 2017 07:08  Phos  3.6     11-07  Mg     2.2     11-07      PT/INR - ( 08 Nov 2017 07:08 )   PT: 17.1 sec;   INR: 1.57 ratio         PTT - ( 08 Nov 2017 07:08 )  PTT:34.3 sec    CAPILLARY BLOOD GLUCOSE  139 (07 Nov 2017 17:52)      POCT Blood Glucose.: 218 mg/dL (07 Nov 2017 21:03)  POCT Blood Glucose.: 139 mg/dL (07 Nov 2017 17:52)  POCT Blood Glucose.: 144 mg/dL (07 Nov 2017 13:15)  POCT Blood Glucose.: 143 mg/dL (07 Nov 2017 08:58)            RADIOLOGY & ADDITIONAL TESTS:    Imaging Personally Reviewed:  [ ] YES  [ ] NO

## 2017-11-08 NOTE — PROGRESS NOTE ADULT - PROBLEM SELECTOR PLAN 2
ESRD on HD, nephrology on board Dr. Elif Solis - HD T, Thu, Saturday via permcath.   - s/p permacath removal 11/6, will place permacath pending 11/7 cultures 48-72 hours. If positive, however, will need to consider shiley placement  - d/w Dr. Solis, would like for next dialysis session to be either on 11/9 or 11/10  - Vascular sx consult for AVF, plan for OR next Thursday

## 2017-11-08 NOTE — PROGRESS NOTE ADULT - SUBJECTIVE AND OBJECTIVE BOX
Patient seen in follow up for ESRD; no distress.    MEDICATIONS  (STANDING):  amiodarone    Tablet 200 milliGRAM(s) Oral daily  artificial  tears Solution 1 Drop(s) Both EYES four times a day  aspirin  chewable 81 milliGRAM(s) Oral daily  atorvastatin 40 milliGRAM(s) Oral at bedtime  BACItracin   Ointment 1 Application(s) Topical two times a day  busPIRone 5 milliGRAM(s) Oral two times a day  clopidogrel Tablet 75 milliGRAM(s) Oral daily  clotrimazole/betamethasone Cream 1 Application(s) Topical two times a day  dextrose 5%. 1000 milliLiter(s) (50 mL/Hr) IV Continuous <Continuous>  dextrose 50% Injectable 12.5 Gram(s) IV Push once  dextrose 50% Injectable 25 Gram(s) IV Push once  dextrose 50% Injectable 25 Gram(s) IV Push once  fluticasone propionate   110 MICROgram(s) HFA Inhaler 1 Puff(s) Inhalation daily  insulin lispro (HumaLOG) corrective regimen sliding scale   SubCutaneous Before meals and at bedtime  Nephro-sophie 1 Tablet(s) Oral daily  pantoprazole   Suspension 40 milliGRAM(s) Oral before breakfast  tamsulosin 0.4 milliGRAM(s) Oral at bedtime    MEDICATIONS  (PRN):  ALBUTerol    90 MICROgram(s) HFA Inhaler 2 Puff(s) Inhalation every 6 hours PRN Shortness of Breath and/or Wheezing  dextrose Gel 1 Dose(s) Oral once PRN Blood Glucose LESS THAN 70 milliGRAM(s)/deciliter  glucagon  Injectable 1 milliGRAM(s) IntraMuscular once PRN Glucose LESS THAN 70 milligrams/deciliter    PHYSICAL EXAM:      T(C): 36.7 (11-08-17 @ 12:48), Max: 36.7 (11-07-17 @ 20:49)  HR: 90 (11-08-17 @ 12:48) (78 - 94)  BP: 143/84 (11-08-17 @ 12:48) (107/67 - 143/84)  RR: 16 (11-08-17 @ 12:48) (15 - 178)  SpO2: 99% (11-08-17 @ 12:48) (94% - 99%)  Wt(kg): --  Respiratory: clear anteriorly, decreased BS at bases  Cardiovascular: S1 S2  Gastrointestinal: soft NT ND +BS  Extremities:   1 edema                                    12.0   6.4   )-----------( 289      ( 08 Nov 2017 07:08 )             36.8     11-08    137  |  96  |  54<H>  ----------------------------<  139<H>  4.8   |  23  |  4.91<H>    Ca    9.9      08 Nov 2017 07:08  Phos  3.6     11-07  Mg     2.2     11-07            Assessment and Plan:  No immediate indication for HD, continue catheter free for now.   Will follow.

## 2017-11-08 NOTE — PROGRESS NOTE ADULT - SUBJECTIVE AND OBJECTIVE BOX
Patient is a 72y old  Male who presents with a chief complaint of s/p cardiac cath (24 Oct 2017 15:51)    Being followed by ID for bacteremia    Interval history:  No acute events      ROS:  Not obtainable  Antimicrobials:  vanco post HD       Other medications reviewed    Vital Signs Last 24 Hrs  T(C): 36.3 (11-08-17 @ 10:00), Max: 36.7 (11-07-17 @ 20:49)  T(F): 97.3 (11-08-17 @ 10:00), Max: 98 (11-07-17 @ 20:49)  HR: 94 (11-08-17 @ 10:00) (78 - 94)  BP: 140/85 (11-08-17 @ 10:00) (107/67 - 140/85)  BP(mean): --  RR: 15 (11-08-17 @ 10:00) (15 - 178)  SpO2: 94% (11-08-17 @ 10:00) (94% - 99%)    Physical Exam:        HEENT PERRLA ,No pallor or icterus    No oral exudate or erythema    Neck supple no JVD or LN  removed HD cath site no erythema or tenderness    Chest Good AE,CTA    CVS RRR S1 S2 WNl No murmur or rub or gallop    Abd soft BS normal No tenderness no masses    Ext No cyanosis clubbing or edema    IV site no erythema tenderness or discharge    Joints no swelling or LOM    CNSNOn verbal     Lab Data:                          12.0   6.4   )-----------( 289      ( 08 Nov 2017 07:08 )             36.8       11-08    137  |  96  |  54<H>  ----------------------------<  139<H>  4.8   |  23  |  4.91<H>    Ca    9.9      08 Nov 2017 07:08  Phos  3.6     11-07  Mg     2.2     11-07          Culture - Blood (collected 07 Nov 2017 08:47)  Source: .Blood Blood-Venous  Preliminary Report (08 Nov 2017 09:01):    No growth to date.    Culture - Blood (collected 07 Nov 2017 08:47)  Source: .Blood Blood-Peripheral  Preliminary Report (08 Nov 2017 09:01):    No growth to date.    Culture - Catheter (collected 06 Nov 2017 23:36)  Source: .Catheter IV Dialysis Perm Cath  Preliminary Report (07 Nov 2017 16:19):    No growth to date.    Culture - Blood (collected 04 Nov 2017 22:37)  Source: .Blood Blood-Peripheral  Preliminary Report (05 Nov 2017 22:01):    No growth to date.    Culture - Blood (collected 04 Nov 2017 22:37)  Source: .Blood Blood  Preliminary Report (05 Nov 2017 22:01):    No growth to date.

## 2017-11-09 LAB
ANION GAP SERPL CALC-SCNC: 22 MMOL/L — HIGH (ref 5–17)
APTT BLD: 32.6 SEC — SIGNIFICANT CHANGE UP (ref 27.5–37.4)
BUN SERPL-MCNC: 71 MG/DL — HIGH (ref 7–23)
CALCIUM SERPL-MCNC: 9.1 MG/DL — SIGNIFICANT CHANGE UP (ref 8.4–10.5)
CHLORIDE SERPL-SCNC: 96 MMOL/L — SIGNIFICANT CHANGE UP (ref 96–108)
CO2 SERPL-SCNC: 23 MMOL/L — SIGNIFICANT CHANGE UP (ref 22–31)
CREAT SERPL-MCNC: 6.02 MG/DL — HIGH (ref 0.5–1.3)
CULTURE RESULTS: SIGNIFICANT CHANGE UP
CULTURE RESULTS: SIGNIFICANT CHANGE UP
GLUCOSE BLDC GLUCOMTR-MCNC: 173 MG/DL — HIGH (ref 70–99)
GLUCOSE BLDC GLUCOMTR-MCNC: 201 MG/DL — HIGH (ref 70–99)
GLUCOSE SERPL-MCNC: 145 MG/DL — HIGH (ref 70–99)
HCT VFR BLD CALC: 33.4 % — LOW (ref 39–50)
HGB BLD-MCNC: 11 G/DL — LOW (ref 13–17)
INR BLD: 1.47 RATIO — HIGH (ref 0.88–1.16)
MCHC RBC-ENTMCNC: 29.7 PG — SIGNIFICANT CHANGE UP (ref 27–34)
MCHC RBC-ENTMCNC: 32.9 GM/DL — SIGNIFICANT CHANGE UP (ref 32–36)
MCV RBC AUTO: 90.4 FL — SIGNIFICANT CHANGE UP (ref 80–100)
PLATELET # BLD AUTO: 283 K/UL — SIGNIFICANT CHANGE UP (ref 150–400)
POTASSIUM SERPL-MCNC: 4.1 MMOL/L — SIGNIFICANT CHANGE UP (ref 3.5–5.3)
POTASSIUM SERPL-SCNC: 4.1 MMOL/L — SIGNIFICANT CHANGE UP (ref 3.5–5.3)
PROTHROM AB SERPL-ACNC: 16.1 SEC — HIGH (ref 9.8–12.7)
RBC # BLD: 3.7 M/UL — LOW (ref 4.2–5.8)
RBC # FLD: 14.8 % — HIGH (ref 10.3–14.5)
SODIUM SERPL-SCNC: 141 MMOL/L — SIGNIFICANT CHANGE UP (ref 135–145)
SPECIMEN SOURCE: SIGNIFICANT CHANGE UP
SPECIMEN SOURCE: SIGNIFICANT CHANGE UP
VANCOMYCIN FLD-MCNC: 17.7 UG/ML — SIGNIFICANT CHANGE UP
WBC # BLD: 6.9 K/UL — SIGNIFICANT CHANGE UP (ref 3.8–10.5)
WBC # FLD AUTO: 6.9 K/UL — SIGNIFICANT CHANGE UP (ref 3.8–10.5)

## 2017-11-09 PROCEDURE — 77001 FLUOROGUIDE FOR VEIN DEVICE: CPT | Mod: 26

## 2017-11-09 PROCEDURE — 76937 US GUIDE VASCULAR ACCESS: CPT | Mod: 26

## 2017-11-09 PROCEDURE — 36558 INSERT TUNNELED CV CATH: CPT

## 2017-11-09 PROCEDURE — 99232 SBSQ HOSP IP/OBS MODERATE 35: CPT

## 2017-11-09 RX ORDER — VANCOMYCIN HCL 1 G
500 VIAL (EA) INTRAVENOUS ONCE
Qty: 0 | Refills: 0 | Status: COMPLETED | OUTPATIENT
Start: 2017-11-09 | End: 2017-11-09

## 2017-11-09 RX ORDER — WARFARIN SODIUM 2.5 MG/1
3 TABLET ORAL ONCE
Qty: 0 | Refills: 0 | Status: COMPLETED | OUTPATIENT
Start: 2017-11-09 | End: 2017-11-09

## 2017-11-09 RX ADMIN — Medication 1 DROP(S): at 13:59

## 2017-11-09 RX ADMIN — Medication 1: at 22:16

## 2017-11-09 RX ADMIN — Medication 1 APPLICATION(S): at 21:38

## 2017-11-09 RX ADMIN — Medication 81 MILLIGRAM(S): at 13:59

## 2017-11-09 RX ADMIN — AMIODARONE HYDROCHLORIDE 200 MILLIGRAM(S): 400 TABLET ORAL at 06:26

## 2017-11-09 RX ADMIN — Medication 1 DROP(S): at 06:25

## 2017-11-09 RX ADMIN — Medication 1 DROP(S): at 21:37

## 2017-11-09 RX ADMIN — ATORVASTATIN CALCIUM 40 MILLIGRAM(S): 80 TABLET, FILM COATED ORAL at 21:38

## 2017-11-09 RX ADMIN — Medication 1 DROP(S): at 00:43

## 2017-11-09 RX ADMIN — CLOTRIMAZOLE AND BETAMETHASONE DIPROPIONATE 1 APPLICATION(S): 10; .5 CREAM TOPICAL at 21:38

## 2017-11-09 RX ADMIN — Medication 1 PUFF(S): at 21:39

## 2017-11-09 RX ADMIN — Medication 2: at 14:00

## 2017-11-09 RX ADMIN — Medication 5 MILLIGRAM(S): at 06:25

## 2017-11-09 RX ADMIN — TAMSULOSIN HYDROCHLORIDE 0.4 MILLIGRAM(S): 0.4 CAPSULE ORAL at 21:39

## 2017-11-09 RX ADMIN — CLOPIDOGREL BISULFATE 75 MILLIGRAM(S): 75 TABLET, FILM COATED ORAL at 13:59

## 2017-11-09 RX ADMIN — PANTOPRAZOLE SODIUM 40 MILLIGRAM(S): 20 TABLET, DELAYED RELEASE ORAL at 06:26

## 2017-11-09 RX ADMIN — CLOTRIMAZOLE AND BETAMETHASONE DIPROPIONATE 1 APPLICATION(S): 10; .5 CREAM TOPICAL at 06:25

## 2017-11-09 RX ADMIN — Medication 1 TABLET(S): at 13:59

## 2017-11-09 RX ADMIN — Medication 5 MILLIGRAM(S): at 21:39

## 2017-11-09 RX ADMIN — WARFARIN SODIUM 3 MILLIGRAM(S): 2.5 TABLET ORAL at 21:38

## 2017-11-09 RX ADMIN — Medication 100 MILLIGRAM(S): at 22:01

## 2017-11-09 RX ADMIN — Medication 1 APPLICATION(S): at 06:25

## 2017-11-09 NOTE — PROGRESS NOTE ADULT - ATTENDING COMMENTS
Not in distress, no chest pain or SOB  - NPO now for pericardial drain by IR around 11:30a after family and the patient decided to go for the procedure    Had 55 min conversation with family and patient yesterday about the plans of care, the risks  - Spoke to Card, Renal and palliative care  - Depending on the characteristic of pericardial fluid (hemorrhagic or not) will resume IV heparin gtt  - HD post drain if stable per Renal  - DNR Calm, no fever, chest pain VSS  - spoke to ID and cleared for PermCath placement today by IR, Repeat blood c/s neg, IV anbx post HD per ID  - Renal agrees with the plan, Will have HD today. Next week he will have AVF per Vascular Sx  - will start coumadin tonight  - c/w all his meds  - PT eval

## 2017-11-09 NOTE — PROGRESS NOTE ADULT - SUBJECTIVE AND OBJECTIVE BOX
TEAM 4 Medicine Intern: Oscar Lorenzo  Spectralink: [phone]  Pager: [phone]      Patient is a 72y old  Male who presents with a chief complaint of s/p cardiac cath (24 Oct 2017 15:51)      INTERVAL HPI/OVERNIGHT EVENTS:        REVIEW OF SYSTEMS:  CONSTITUTIONAL: No fever, weight loss, or fatigue  EYES: No eye pain, visual disturbances, or discharge  ENMT:  No difficulty hearing, tinnitus, vertigo; No sinus or throat pain  NECK: No pain or stiffness  BREASTS: No pain, masses, or nipple discharge  RESPIRATORY: No cough, wheezing, chills or hemoptysis; No shortness of breath  CARDIOVASCULAR: No chest pain, palpitations, dizziness, or leg swelling  GASTROINTESTINAL: No abdominal or epigastric pain. No nausea, vomiting, or hematemesis; No diarrhea or constipation. No melena or hematochezia.  GENITOURINARY: No dysuria, frequency, hematuria, or incontinence  NEUROLOGICAL: No headaches, memory loss, loss of strength, numbness, or tremors  SKIN: No itching, burning, rashes, or lesions   LYMPH NODES: No enlarged glands  ENDOCRINE: No heat or cold intolerance; No hair loss  MUSCULOSKELETAL: No joint pain or swelling; No muscle, back, or extremity pain  PSYCHIATRIC: No depression, anxiety, mood swings, or difficulty sleeping  HEME/LYMPH: No easy bruising, or bleeding gums  ALLERY AND IMMUNOLOGIC: No hives or eczema    T(C): 36.8 (11-09-17 @ 05:25), Max: 36.9 (11-09-17 @ 00:26)  HR: 86 (11-09-17 @ 05:25) (82 - 94)  BP: 149/94 (11-09-17 @ 05:25) (115/75 - 149/94)  RR: 18 (11-09-17 @ 05:25) (15 - 18)  SpO2: 99% (11-09-17 @ 05:25) (94% - 100%)  Wt(kg): --Vital Signs Last 24 Hrs  T(C): 36.8 (09 Nov 2017 05:25), Max: 36.9 (09 Nov 2017 00:26)  T(F): 98.3 (09 Nov 2017 05:25), Max: 98.5 (09 Nov 2017 00:26)  HR: 86 (09 Nov 2017 05:25) (82 - 94)  BP: 149/94 (09 Nov 2017 05:25) (115/75 - 149/94)  BP(mean): --  RR: 18 (09 Nov 2017 05:25) (15 - 18)  SpO2: 99% (09 Nov 2017 05:25) (94% - 100%)    PHYSICAL EXAM:  GENERAL: NAD, well-groomed, well-developed  HEAD:  Atraumatic, Normocephalic  EYES: EOMI, PERRLA, conjunctiva and sclera clear  ENMT: No tonsillar erythema, exudates, or enlargement; Moist mucous membranes, Good dentition, No lesions  NECK: Supple, No JVD, Normal thyroid  NERVOUS SYSTEM:  Alert & Oriented X3, Good concentration; Motor Strength 5/5 B/L upper and lower extremities; DTRs 2+ intact and symmetric  CHEST/LUNG: Clear to percussion bilaterally; No rales, rhonchi, wheezing, or rubs  HEART: Regular rate and rhythm; No murmurs, rubs, or gallops  ABDOMEN: Soft, Nontender, Nondistended; Bowel sounds present  EXTREMITIES:  2+ Peripheral Pulses, No clubbing, cyanosis, or edema  LYMPH: No lymphadenopathy noted  SKIN: No rashes or lesions    Consultant(s) Notes Reviewed:  [x ] YES  [ ] NO  Care Discussed with Consultants/Other Providers [ x] YES  [ ] NO    LABS:                        12.0   6.4   )-----------( 289      ( 08 Nov 2017 07:08 )             36.8     11-08    137  |  96  |  54<H>  ----------------------------<  139<H>  4.8   |  23  |  4.91<H>    Ca    9.9      08 Nov 2017 07:08      PT/INR - ( 08 Nov 2017 07:08 )   PT: 17.1 sec;   INR: 1.57 ratio         PTT - ( 08 Nov 2017 07:08 )  PTT:34.3 sec    CAPILLARY BLOOD GLUCOSE      POCT Blood Glucose.: 196 mg/dL (08 Nov 2017 20:47)  POCT Blood Glucose.: 140 mg/dL (08 Nov 2017 17:48)  POCT Blood Glucose.: 174 mg/dL (08 Nov 2017 12:59)  POCT Blood Glucose.: 170 mg/dL (08 Nov 2017 08:46)            RADIOLOGY & ADDITIONAL TESTS:    Imaging Personally Reviewed:  [ ] YES  [ ] NO TEAM 4 Medicine Intern: Oscar Lorenzo  Spectralink: [phone]  Pager: [phone]      Patient is a 72y old  Male who presents with a chief complaint of s/p cardiac cath (24 Oct 2017 15:51)      INTERVAL HPI/OVERNIGHT EVENTS:        T(C): 36.8 (11-09-17 @ 05:25), Max: 36.9 (11-09-17 @ 00:26)  HR: 86 (11-09-17 @ 05:25) (82 - 94)  BP: 149/94 (11-09-17 @ 05:25) (115/75 - 149/94)  RR: 18 (11-09-17 @ 05:25) (15 - 18)  SpO2: 99% (11-09-17 @ 05:25) (94% - 100%)  Wt(kg): --Vital Signs Last 24 Hrs  T(C): 36.8 (09 Nov 2017 05:25), Max: 36.9 (09 Nov 2017 00:26)  T(F): 98.3 (09 Nov 2017 05:25), Max: 98.5 (09 Nov 2017 00:26)  HR: 86 (09 Nov 2017 05:25) (82 - 94)  BP: 149/94 (09 Nov 2017 05:25) (115/75 - 149/94)  BP(mean): --  RR: 18 (09 Nov 2017 05:25) (15 - 18)  SpO2: 99% (09 Nov 2017 05:25) (94% - 100%)    PHYSICAL EXAM:  GENERAL: NAD, well-developed, sleeping comfortably in bed  HEAD:  Atraumatic, Normocephalic  EYES: conjunctiva and sclera clear  NECK: Supple, No JVD  CHEST/LUNG: Clear to auscultation bilaterally; No wheeze, permacath removal site taped and draped no sign of infection/bleeding/erythema  HEART: Regular rate and rhythm; holosytolic murmur, rubs, or gallops  ABDOMEN: Soft, Nontender, Nondistended; Bowel sounds present  EXTREMITIES:  2+ Peripheral Pulses, No clubbing, cyanosis, or edema  PSYCH: awake and alert, not following commands  SKIN: sacral decubitus stage 3 ulcer present, permacath removal site in R anterior chest wall, small blister on lateral edge of R thumb       Consultant(s) Notes Reviewed:  [x ] YES  [ ] NO  Care Discussed with Consultants/Other Providers [ x] YES  [ ] NO    LABS:                        12.0   6.4   )-----------( 289      ( 08 Nov 2017 07:08 )             36.8     11-08    137  |  96  |  54<H>  ----------------------------<  139<H>  4.8   |  23  |  4.91<H>    Ca    9.9      08 Nov 2017 07:08      PT/INR - ( 08 Nov 2017 07:08 )   PT: 17.1 sec;   INR: 1.57 ratio         PTT - ( 08 Nov 2017 07:08 )  PTT:34.3 sec    CAPILLARY BLOOD GLUCOSE      POCT Blood Glucose.: 196 mg/dL (08 Nov 2017 20:47)  POCT Blood Glucose.: 140 mg/dL (08 Nov 2017 17:48)  POCT Blood Glucose.: 174 mg/dL (08 Nov 2017 12:59)  POCT Blood Glucose.: 170 mg/dL (08 Nov 2017 08:46)            RADIOLOGY & ADDITIONAL TESTS:    Imaging Personally Reviewed:  [ ] YES  [ ] NO TEAM 4 Medicine Intern: Oscar Lorenzo  Spectralink: [phone]  Pager: [phone]      Patient is a 72y old  Male who presents with a chief complaint of s/p cardiac cath (24 Oct 2017 15:51)      INTERVAL HPI/OVERNIGHT EVENTS: Overnight, no acute events. Blood cultures from 11/7 now negative after 48 hours. Patient will go for permacath removal by IR today if cultures remain clear. Morning vanc level at 17.7 Patient non-verbal with staff at baseline.        T(C): 36.8 (11-09-17 @ 05:25), Max: 36.9 (11-09-17 @ 00:26)  HR: 86 (11-09-17 @ 05:25) (82 - 94)  BP: 149/94 (11-09-17 @ 05:25) (115/75 - 149/94)  RR: 18 (11-09-17 @ 05:25) (15 - 18)  SpO2: 99% (11-09-17 @ 05:25) (94% - 100%)  Wt(kg): --Vital Signs Last 24 Hrs  T(C): 36.8 (09 Nov 2017 05:25), Max: 36.9 (09 Nov 2017 00:26)  T(F): 98.3 (09 Nov 2017 05:25), Max: 98.5 (09 Nov 2017 00:26)  HR: 86 (09 Nov 2017 05:25) (82 - 94)  BP: 149/94 (09 Nov 2017 05:25) (115/75 - 149/94)  BP(mean): --  RR: 18 (09 Nov 2017 05:25) (15 - 18)  SpO2: 99% (09 Nov 2017 05:25) (94% - 100%)    PHYSICAL EXAM:  GENERAL: NAD, well-developed, sleeping comfortably in bed  HEAD:  Atraumatic, Normocephalic  EYES: conjunctiva and sclera clear  NECK: Supple, No JVD  CHEST/LUNG: Clear to auscultation bilaterally; No wheeze  HEART: Regular rate and rhythm; holosytolic murmur, rubs, or gallops  ABDOMEN: Soft, Nontender, Nondistended; Bowel sounds present  EXTREMITIES:  2+ Peripheral Pulses, No clubbing, cyanosis, or edema  PSYCH: awake and alert, not following commands  SKIN: sacral decubitus stage 3 ulcer present, permacath removal site in R anterior chest wall no sign of infection/bleeding/erythema, small blister on lateral edge of R thumb       Consultant(s) Notes Reviewed:  [x ] YES  [ ] NO  Care Discussed with Consultants/Other Providers [ x] YES  [ ] NO    LABS:                        12.0   6.4   )-----------( 289      ( 08 Nov 2017 07:08 )             36.8     11-08    137  |  96  |  54<H>  ----------------------------<  139<H>  4.8   |  23  |  4.91<H>    Ca    9.9      08 Nov 2017 07:08      PT/INR - ( 08 Nov 2017 07:08 )   PT: 17.1 sec;   INR: 1.57 ratio         PTT - ( 08 Nov 2017 07:08 )  PTT:34.3 sec    CAPILLARY BLOOD GLUCOSE      POCT Blood Glucose.: 196 mg/dL (08 Nov 2017 20:47)  POCT Blood Glucose.: 140 mg/dL (08 Nov 2017 17:48)  POCT Blood Glucose.: 174 mg/dL (08 Nov 2017 12:59)  POCT Blood Glucose.: 170 mg/dL (08 Nov 2017 08:46)            RADIOLOGY & ADDITIONAL TESTS:    Imaging Personally Reviewed:  [ ] YES  [ ] NO

## 2017-11-09 NOTE — PROGRESS NOTE ADULT - SUBJECTIVE AND OBJECTIVE BOX
Patient is a 72y old  Male who presents with a chief complaint of s/p cardiac cath (24 Oct 2017 15:51)    Being followed by ID for bacteremia    Interval history:  No acute events      ROS:  Not obtainable    Antimicrobials:  vanco post HD    Other medications reviewed    Vital Signs Last 24 Hrs  T(C): 36.6 (11-09-17 @ 13:47), Max: 36.9 (11-09-17 @ 00:26)  T(F): 97.9 (11-09-17 @ 13:47), Max: 98.5 (11-09-17 @ 00:26)  HR: 92 (11-09-17 @ 13:47) (82 - 92)  BP: 157/89 (11-09-17 @ 13:47) (115/75 - 157/89)  BP(mean): --  RR: 18 (11-09-17 @ 13:47) (16 - 18)  SpO2: 100% (11-09-17 @ 13:47) (99% - 100%)    Physical Exam:    Constitutional comfortable,    HEENT PERRLA ,No pallor or icterus    No oral exudate or erythema    Neck supple no JVD or LN  removed HD cath  site no erythema or tenderness    Chest Good AE,CTA    CVS RRR S1 S2 WNl No murmur or rub or gallop    Abd soft BS normal No tenderness no masses    Ext No cyanosis clubbing or edema    IV site no erythema tenderness or discharge    Joints no swelling or LOM    CNS no response to stimuli    Lab Data:                          11.0   6.9   )-----------( 283      ( 09 Nov 2017 06:46 )             33.4       11-09    141  |  96  |  71<H>  ----------------------------<  145<H>  4.1   |  23  |  6.02<H>    Ca    9.1      09 Nov 2017 06:46          Culture - Blood (collected 07 Nov 2017 08:47)  Source: .Blood Blood-Venous  Preliminary Report (08 Nov 2017 09:01):    No growth to date.    Culture - Blood (collected 07 Nov 2017 08:47)  Source: .Blood Blood-Peripheral  Preliminary Report (08 Nov 2017 09:01):    No growth to date.    Culture - Catheter (collected 06 Nov 2017 23:36)  Source: .Catheter IV Dialysis Perm Cath  Final Report (08 Nov 2017 20:04):    No growth at 48 hours    Culture - Blood (collected 04 Nov 2017 22:37)  Source: .Blood Blood-Peripheral  Preliminary Report (05 Nov 2017 22:01):    No growth to date.    Culture - Blood (collected 04 Nov 2017 22:37)  Source: .Blood Blood  Preliminary Report (05 Nov 2017 22:01):    No growth to date.            Vancomycin Level, Random: 17.7 ug/mL (11-09-17 @ 06:46)

## 2017-11-09 NOTE — PROGRESS NOTE ADULT - SUBJECTIVE AND OBJECTIVE BOX
Subjective: resting in bed. Disoriented. Verbalizes very little. R IJ PC inserted this am.       MEDICATIONS  (STANDING):  amiodarone    Tablet 200 milliGRAM(s) Oral daily  artificial  tears Solution 1 Drop(s) Both EYES four times a day  aspirin  chewable 81 milliGRAM(s) Oral daily  atorvastatin 40 milliGRAM(s) Oral at bedtime  BACItracin   Ointment 1 Application(s) Topical two times a day  busPIRone 5 milliGRAM(s) Oral two times a day  clopidogrel Tablet 75 milliGRAM(s) Oral daily  clotrimazole/betamethasone Cream 1 Application(s) Topical two times a day  dextrose 5%. 1000 milliLiter(s) (50 mL/Hr) IV Continuous <Continuous>  dextrose 50% Injectable 12.5 Gram(s) IV Push once  dextrose 50% Injectable 25 Gram(s) IV Push once  dextrose 50% Injectable 25 Gram(s) IV Push once  fluticasone propionate   110 MICROgram(s) HFA Inhaler 1 Puff(s) Inhalation daily  insulin lispro (HumaLOG) corrective regimen sliding scale   SubCutaneous Before meals and at bedtime  Nephro-sophie 1 Tablet(s) Oral daily  pantoprazole   Suspension 40 milliGRAM(s) Oral before breakfast  tamsulosin 0.4 milliGRAM(s) Oral at bedtime    MEDICATIONS  (PRN):  ALBUTerol    90 MICROgram(s) HFA Inhaler 2 Puff(s) Inhalation every 6 hours PRN Shortness of Breath and/or Wheezing  dextrose Gel 1 Dose(s) Oral once PRN Blood Glucose LESS THAN 70 milliGRAM(s)/deciliter  glucagon  Injectable 1 milliGRAM(s) IntraMuscular once PRN Glucose LESS THAN 70 milligrams/deciliter          T(C): 36.6 (11-09-17 @ 13:47), Max: 36.9 (11-09-17 @ 00:26)  HR: 92 (11-09-17 @ 13:47) (82 - 92)  BP: 157/89 (11-09-17 @ 13:47) (115/75 - 157/89)  RR: 18 (11-09-17 @ 13:47) (16 - 18)  SpO2: 100% (11-09-17 @ 13:47) (99% - 100%)  Wt(kg): --        I&O's Detail    08 Nov 2017 07:01  -  09 Nov 2017 07:00  --------------------------------------------------------  IN:  Total IN: 0 mL    OUT:  Total OUT: 0 mL    Total NET: 0 mL               PHYSICAL EXAM:    GENERAL: no distress  EYES: EOMI, PERRLA, conjunctiva and sclera clear  NECK: Supple, no inc in JVP  CHEST/LUNG: Clear  HEART: S1S2  ABDOMEN: Soft, Nontender, Nondistended; Bowel sounds present  EXTREMITIES:  no edema  ACCESS: R chest tunneled cath      LABS:  CBC Full  -  ( 09 Nov 2017 06:46 )  WBC Count : 6.9 K/uL  Hemoglobin : 11.0 g/dL  Hematocrit : 33.4 %  Platelet Count - Automated : 283 K/uL  Mean Cell Volume : 90.4 fl  Mean Cell Hemoglobin : 29.7 pg  Mean Cell Hemoglobin Concentration : 32.9 gm/dL  Auto Neutrophil # : x  Auto Lymphocyte # : x  Auto Monocyte # : x  Auto Eosinophil # : x  Auto Basophil # : x  Auto Neutrophil % : x  Auto Lymphocyte % : x  Auto Monocyte % : x  Auto Eosinophil % : x  Auto Basophil % : x    11-09    141  |  96  |  71<H>  ----------------------------<  145<H>  4.1   |  23  |  6.02<H>    Ca    9.1      09 Nov 2017 06:46      PT/INR - ( 09 Nov 2017 06:46 )   PT: 16.1 sec;   INR: 1.47 ratio         PTT - ( 09 Nov 2017 06:46 )  PTT:32.6 sec    Culture Results:   No growth to date. (11-07 @ 08:47)  Culture Results:   No growth to date. (11-07 @ 08:47)  Culture Results:   No growth at 48 hours (11-06 @ 23:36)        Assessment and Recommendation:     ESRD on HD, admitted for eval/management of bacteremia. On Vanco thrice weekly after HD  S/p perm cath exchange. New tunneled cath placed today by IR  Next maint HD 11/10 as Rxed.   AVF eventually once cleared by ID

## 2017-11-09 NOTE — PROGRESS NOTE ADULT - PROBLEM SELECTOR PLAN 2
ESRD on HD, nephrology on board Dr. Elif Solis - HD T, Thu, Saturday via permcath.   - s/p permacath removal 11/6, will place permacath pending 11/7 cultures 48-72 hours remain negative. If positive, however, will need to consider shiley placement  - d/w Dr. Solis, would like for next dialysis session to be either on 11/9 or 11/10  - Vascular sx consult for AVF, plan for OR next Thursday

## 2017-11-09 NOTE — PROGRESS NOTE ADULT - SUBJECTIVE AND OBJECTIVE BOX
Interventional Radiology Pre-Procedure Note    This is a 72y Male with renal failure requirinf long term dialysis access prior to avf placement.    PAST MEDICAL & SURGICAL HISTORY:  CVA (cerebral vascular accident)  COPD (chronic obstructive pulmonary disease)  BPH (benign prostatic hyperplasia)  Bipolar affective disorder  CKD (chronic kidney disease)  Pancreatitis  Hypertension  Dyslipidemia  Diabetes mellitus  Coronary artery disease  HLD (hyperlipidemia)  Anemia  Acute renal failure  CKD (chronic kidney disease)  COPD (chronic obstructive pulmonary disease)  Fainting  Cardiac arrhythmia  Dizziness  Hydronephrosis  Cholecystitis  Bipolar 1 disorder  DM (diabetes mellitus)  HTN (hypertension)  Lumbar radiculopathy  Left heart failure  Reflux esophagitis  Coronary atherosclerosis  History of cardiac catheterization  No significant past surgical history       Allergies: No Known Allergies      LABS:                        11.0   6.9   )-----------( 283      ( 09 Nov 2017 06:46 )             33.4     11-09    141  |  96  |  71<H>  ----------------------------<  145<H>  4.1   |  23  |  6.02<H>    Ca    9.1      09 Nov 2017 06:46      PT/INR - ( 09 Nov 2017 06:46 )   PT: 16.1 sec;   INR: 1.47 ratio         PTT - ( 09 Nov 2017 06:46 )  PTT:32.6 sec    Procedure/ risks/ benefits were explained, informed consent obtained from patients family, verbalizes understanding.

## 2017-11-09 NOTE — PROGRESS NOTE ADULT - SUBJECTIVE AND OBJECTIVE BOX
SUBJECTIVE: Patient seen and examined in IR recovery suite. He is s/p RIJ permacath placement this morning.    OBJECTIVE    T(C): 36.8 (11-09-17 @ 05:25), Max: 36.9 (11-09-17 @ 00:26)  HR: 86 (11-09-17 @ 05:25) (82 - 88)  BP: 149/94 (11-09-17 @ 05:25) (115/75 - 149/94)  RR: 18 (11-09-17 @ 05:25) (16 - 18)  SpO2: 99% (11-09-17 @ 05:25) (99% - 100%)    EXAM  General: NAD  Extremities: BUE warm, RUE in splint    LABS                        11.0   6.9   )-----------( 283      ( 09 Nov 2017 06:46 )             33.4     11-09    141  |  96  |  71<H>  ----------------------------<  145<H>  4.1   |  23  |  6.02<H>    Ca    9.1      09 Nov 2017 06:46      PT/INR - ( 09 Nov 2017 06:46 )   PT: 16.1 sec;   INR: 1.47 ratio      PTT - ( 09 Nov 2017 06:46 )  PTT:32.6 sec    RADIOLOGY & ADDITIONAL STUDIES    < from: VA Duplex Upper Ext Vein Scan, Bilat (11.07.17 @ 11:53) >  The right and left brachial veins, axillary veins, the left subclavian   vein and the right and left internal jugular veins are patent and free of   thrombus.    The right and left cephalic veins were NOT visualized.    The right  basilic vein joins the brachial vein in the upper arm.    The diameters of the imaged portions of the right and left basilic veins   are tabulated below:        Right Cephalic Vein   NOT visualized       Right Basilic Vein   mid   0.27 cm  distal   0.22  ACF    0.11   prox forearm  0.06  mid      not visualized   distal   not visualized        Left Cephalic Vein   NOT visualized       Left Basilic Vein   prox upper arm  0.22 cm   mid   0.18  distal   0.08  ACF    0.03  prox forearm  0.06  mid   0.06  distal    not visualized       IMPRESSION: The diameters of the IMAGED right and left basilic veins are   entered in the table.     < end of copied text >

## 2017-11-09 NOTE — PROGRESS NOTE ADULT - ASSESSMENT
72M w/ESRD on HD, s/p removal of IR permacath due to positive blood cultures, now with NG x 48 hours in repeat BCx and s/p RIJ permacath placement this morning.    -booked for OR for AV fistula (probable graft) creation next Thursday 11/16/17  -NPOpMN on 11/15/17, preop labs to be drawn 11/15/17 including type and screen  -please continue to observe RUE precautions (NO IVs, blood draws, or BPs in R arm)  -continue care per primary team, ID  -d/w Dr. Saez  -please call with any questions    A Rich, R3  [phone]

## 2017-11-09 NOTE — PROGRESS NOTE ADULT - PROBLEM SELECTOR PLAN 1
Afebrile, No SOB, repeat blood culture (11/2) growing GPC, however, 11/4 cultures  negative x2. Cultures from 11/6 permacath negative x2. Cultures from 11/7 now negative x2 after 48 hours.   - vanc level this morning 17.7  - s/p permacath removal 11/6, repeat BCx x2 from 11/7 negative x2 after 48 hours  - c/w IV Vanco 500mg post HD (s/p vancomycin post-HD for 10 days now)  - CT A/P negative, CXR negative

## 2017-11-09 NOTE — PROGRESS NOTE ADULT - PROBLEM SELECTOR PLAN 9
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 Has been stable  - c/w ASA, Plavix, statin  - holding coumadin in setting of possible procedures for permacath placement

## 2017-11-09 NOTE — PROGRESS NOTE ADULT - ASSESSMENT
73 yo with DM,ESRD recent HD,CVA ,non verbal.Recent bacteremia,pyelo s/p antimicrobia   hypotension during HD    Blood Cx-CNS and Bacillus-Initial Cx negative  But 10/24 blood Cx again with CNS  s/p RX  11/2 blood Cx still with CNS-though single set but persistent growth  suspicious for ?catheter related CNS BSI  s/p catheter removal  Clinically stable  No fevers  Continue Vanco post HD   repeat Cx so far negative   If stay negative at 72 hrs no ID contraindication to permacath  Vaco post HD for about 10 days   other plan per primary  Case  d/w primary team  I will be away for the next 3 days .ID service will cover.Please call [phone] if issues .

## 2017-11-10 LAB
ALBUMIN SERPL ELPH-MCNC: 4.3 G/DL — SIGNIFICANT CHANGE UP (ref 3.3–5)
ALP SERPL-CCNC: 125 U/L — HIGH (ref 40–120)
ALT FLD-CCNC: 30 U/L RC — SIGNIFICANT CHANGE UP (ref 10–45)
ANION GAP SERPL CALC-SCNC: 18 MMOL/L — HIGH (ref 5–17)
APTT BLD: 32.2 SEC — SIGNIFICANT CHANGE UP (ref 27.5–37.4)
AST SERPL-CCNC: 28 U/L — SIGNIFICANT CHANGE UP (ref 10–40)
BILIRUB SERPL-MCNC: 0.5 MG/DL — SIGNIFICANT CHANGE UP (ref 0.2–1.2)
BUN SERPL-MCNC: 35 MG/DL — HIGH (ref 7–23)
CALCIUM SERPL-MCNC: 9.9 MG/DL — SIGNIFICANT CHANGE UP (ref 8.4–10.5)
CHLORIDE SERPL-SCNC: 96 MMOL/L — SIGNIFICANT CHANGE UP (ref 96–108)
CO2 SERPL-SCNC: 25 MMOL/L — SIGNIFICANT CHANGE UP (ref 22–31)
CREAT SERPL-MCNC: 3.77 MG/DL — HIGH (ref 0.5–1.3)
GLUCOSE BLDC GLUCOMTR-MCNC: 128 MG/DL — HIGH (ref 70–99)
GLUCOSE BLDC GLUCOMTR-MCNC: 152 MG/DL — HIGH (ref 70–99)
GLUCOSE BLDC GLUCOMTR-MCNC: 193 MG/DL — HIGH (ref 70–99)
GLUCOSE BLDC GLUCOMTR-MCNC: 221 MG/DL — HIGH (ref 70–99)
GLUCOSE SERPL-MCNC: 103 MG/DL — HIGH (ref 70–99)
HCT VFR BLD CALC: 37.2 % — LOW (ref 39–50)
HGB BLD-MCNC: 11.7 G/DL — LOW (ref 13–17)
INR BLD: 1.46 RATIO — HIGH (ref 0.88–1.16)
MCHC RBC-ENTMCNC: 28.9 PG — SIGNIFICANT CHANGE UP (ref 27–34)
MCHC RBC-ENTMCNC: 31.5 GM/DL — LOW (ref 32–36)
MCV RBC AUTO: 91.7 FL — SIGNIFICANT CHANGE UP (ref 80–100)
PLATELET # BLD AUTO: 319 K/UL — SIGNIFICANT CHANGE UP (ref 150–400)
POTASSIUM SERPL-MCNC: 3.9 MMOL/L — SIGNIFICANT CHANGE UP (ref 3.5–5.3)
POTASSIUM SERPL-SCNC: 3.9 MMOL/L — SIGNIFICANT CHANGE UP (ref 3.5–5.3)
PROT SERPL-MCNC: 7.9 G/DL — SIGNIFICANT CHANGE UP (ref 6–8.3)
PROTHROM AB SERPL-ACNC: 15.9 SEC — HIGH (ref 9.8–12.7)
RBC # BLD: 4.05 M/UL — LOW (ref 4.2–5.8)
RBC # FLD: 14.8 % — HIGH (ref 10.3–14.5)
SODIUM SERPL-SCNC: 139 MMOL/L — SIGNIFICANT CHANGE UP (ref 135–145)
WBC # BLD: 7.1 K/UL — SIGNIFICANT CHANGE UP (ref 3.8–10.5)
WBC # FLD AUTO: 7.1 K/UL — SIGNIFICANT CHANGE UP (ref 3.8–10.5)

## 2017-11-10 PROCEDURE — 99232 SBSQ HOSP IP/OBS MODERATE 35: CPT

## 2017-11-10 PROCEDURE — 99233 SBSQ HOSP IP/OBS HIGH 50: CPT

## 2017-11-10 RX ORDER — WARFARIN SODIUM 2.5 MG/1
3 TABLET ORAL ONCE
Qty: 0 | Refills: 0 | Status: COMPLETED | OUTPATIENT
Start: 2017-11-10 | End: 2017-11-10

## 2017-11-10 RX ADMIN — ATORVASTATIN CALCIUM 40 MILLIGRAM(S): 80 TABLET, FILM COATED ORAL at 22:02

## 2017-11-10 RX ADMIN — TAMSULOSIN HYDROCHLORIDE 0.4 MILLIGRAM(S): 0.4 CAPSULE ORAL at 22:02

## 2017-11-10 RX ADMIN — Medication 1 DROP(S): at 17:44

## 2017-11-10 RX ADMIN — WARFARIN SODIUM 3 MILLIGRAM(S): 2.5 TABLET ORAL at 22:02

## 2017-11-10 RX ADMIN — CLOTRIMAZOLE AND BETAMETHASONE DIPROPIONATE 1 APPLICATION(S): 10; .5 CREAM TOPICAL at 05:39

## 2017-11-10 RX ADMIN — Medication 81 MILLIGRAM(S): at 11:53

## 2017-11-10 RX ADMIN — Medication 1 APPLICATION(S): at 05:39

## 2017-11-10 RX ADMIN — CLOTRIMAZOLE AND BETAMETHASONE DIPROPIONATE 1 APPLICATION(S): 10; .5 CREAM TOPICAL at 17:46

## 2017-11-10 RX ADMIN — Medication 2: at 22:03

## 2017-11-10 RX ADMIN — Medication 1 PUFF(S): at 17:45

## 2017-11-10 RX ADMIN — Medication 1 DROP(S): at 11:53

## 2017-11-10 RX ADMIN — AMIODARONE HYDROCHLORIDE 200 MILLIGRAM(S): 400 TABLET ORAL at 05:40

## 2017-11-10 RX ADMIN — Medication 1 TABLET(S): at 11:53

## 2017-11-10 RX ADMIN — Medication 1 APPLICATION(S): at 17:44

## 2017-11-10 RX ADMIN — Medication 5 MILLIGRAM(S): at 17:46

## 2017-11-10 RX ADMIN — Medication 5 MILLIGRAM(S): at 05:39

## 2017-11-10 RX ADMIN — PANTOPRAZOLE SODIUM 40 MILLIGRAM(S): 20 TABLET, DELAYED RELEASE ORAL at 05:39

## 2017-11-10 RX ADMIN — CLOPIDOGREL BISULFATE 75 MILLIGRAM(S): 75 TABLET, FILM COATED ORAL at 11:53

## 2017-11-10 RX ADMIN — Medication 1 DROP(S): at 05:39

## 2017-11-10 RX ADMIN — Medication 1: at 13:56

## 2017-11-10 RX ADMIN — Medication: at 18:45

## 2017-11-10 NOTE — PROGRESS NOTE ADULT - ASSESSMENT
72M ESRD admitted for bacteremia and sepsis 2/2 infected temporary dialysis catheter, blood cultures cleared for eventual AV fistula. For OR Thursday 11/16.     -recommend staring heparin gtt for anticoagulation while awaiting OR next week.  -vein mapping completed; poor targets, likely will require graft.  -con't R upper extremity precautions  -will need temporary tunneled dialysis catheter to Left; the side opposite planned AV fistula (right)  -plan for fistula vs graft on Thursday 11/16    -Wednesday 11/15 requires: pre-op labs (CBC, BMP/Mg/Phos, PT/PTT/INR, T&S); CXR, EKG  -NPO @ midnight 11/15      SE Skaggs MD (PGY2)  Surgery Jr. Resident      (Please page [phone] for questions/concerns.) 72M ESRD admitted for bacteremia and sepsis 2/2 infected temporary dialysis catheter, blood cultures cleared for eventual AV fistula. For OR Thursday 11/16.     -recommend staring heparin gtt for anticoagulation while awaiting OR next week.  -vein mapping completed; poor targets, likely will require graft.  -con't R upper extremity arm precautions  -will need temporary tunneled dialysis catheter to Left; the side opposite planned AV fistula (right), per Vascular Attending    -plan for fistula vs graft on Thursday 11/16  -Wednesday 11/15 requires: pre-op labs (CBC, BMP/Mg/Phos, PT/PTT/INR, T&S); CXR, EKG  -NPO @ midnight 11/15        SE Skaggs MD (PGY2)  Surgery Jr. Resident      (Please page [phone] for questions/concerns.)

## 2017-11-10 NOTE — PROGRESS NOTE ADULT - ASSESSMENT
72/M with T2DM, HTN, HLD, Embolic CVA with right hemiplegia, COPD, bipolar, bilateral nephrolithiasis and bladder stones, ASHD, discharged 10/18 after long hospital stay when initially presented with NSTEMI/pulm edema, UTI, VT s/p shock, embolic CVA, new AF, re-admitted after hypotensive episode in dialysis found with consistent bacteremia with COANS and was started on Vancomycin post HD s/p permacath exchange    Bacteremia cleared  Non toxic  recent cultures negative    Continue Vanco post HD through about 11/19/17 as per Dr Huntley    Please call ID if needed over weekend

## 2017-11-10 NOTE — PROVIDER CONTACT NOTE (OTHER) - BACKGROUND
Pt had HD and permacath was used 11/9. There was slight bleeding then. Dressing was reinforced on arrival to floor from HD. Pt on coumadin. INR subtherapeutic.

## 2017-11-10 NOTE — PROGRESS NOTE ADULT - ATTENDING COMMENTS
Seen, examined. Calm, not in distress, tele- no event  - Had HD after permcath put in by IR yesterday  - Reviewed labs, imaging  - Renal, ID, Vascular Sx plan noted  - IV vanco post HD 2 weeks from now. Next week on thursday 11/16 he will have AVF  - coumadin to be stopped and IV heparin gtt to be started for the procedure  - c/w all other meds  ** Spoke to Dtmatthew- Samantha (Dtr) [phone]

## 2017-11-10 NOTE — PROGRESS NOTE ADULT - NSHPATTENDINGPLANDISCUSS_GEN_ALL_CORE
house staff

## 2017-11-10 NOTE — PROGRESS NOTE ADULT - SUBJECTIVE AND OBJECTIVE BOX
TEAM 4 Medicine Intern: Oscar Lorenzo  Spectralink: [id number]  Pager: [phone]      Patient is a 72y old  Male who presents with a chief complaint of s/p cardiac cath (24 Oct 2017 15:51)      INTERVAL HPI/OVERNIGHT EVENTS:        REVIEW OF SYSTEMS:  CONSTITUTIONAL: No fever, weight loss, or fatigue  EYES: No eye pain, visual disturbances, or discharge  ENMT:  No difficulty hearing, tinnitus, vertigo; No sinus or throat pain  NECK: No pain or stiffness  BREASTS: No pain, masses, or nipple discharge  RESPIRATORY: No cough, wheezing, chills or hemoptysis; No shortness of breath  CARDIOVASCULAR: No chest pain, palpitations, dizziness, or leg swelling  GASTROINTESTINAL: No abdominal or epigastric pain. No nausea, vomiting, or hematemesis; No diarrhea or constipation. No melena or hematochezia.  GENITOURINARY: No dysuria, frequency, hematuria, or incontinence  NEUROLOGICAL: No headaches, memory loss, loss of strength, numbness, or tremors  SKIN: No itching, burning, rashes, or lesions   LYMPH NODES: No enlarged glands  ENDOCRINE: No heat or cold intolerance; No hair loss  MUSCULOSKELETAL: No joint pain or swelling; No muscle, back, or extremity pain  PSYCHIATRIC: No depression, anxiety, mood swings, or difficulty sleeping  HEME/LYMPH: No easy bruising, or bleeding gums  ALLERY AND IMMUNOLOGIC: No hives or eczema    T(C): 36.7 (11-10-17 @ 05:02), Max: 37.1 (11-09-17 @ 16:50)  HR: 87 (11-10-17 @ 05:02) (87 - 101)  BP: 124/80 (11-10-17 @ 05:02) (124/80 - 157/89)  RR: 18 (11-10-17 @ 05:02) (17 - 18)  SpO2: 96% (11-10-17 @ 05:02) (94% - 100%)  Wt(kg): --Vital Signs Last 24 Hrs  T(C): 36.7 (10 Nov 2017 05:02), Max: 37.1 (09 Nov 2017 16:50)  T(F): 98.1 (10 Nov 2017 05:02), Max: 98.7 (09 Nov 2017 16:50)  HR: 87 (10 Nov 2017 05:02) (87 - 101)  BP: 124/80 (10 Nov 2017 05:02) (124/80 - 157/89)  BP(mean): --  RR: 18 (10 Nov 2017 05:02) (17 - 18)  SpO2: 96% (10 Nov 2017 05:02) (94% - 100%)    PHYSICAL EXAM:  GENERAL: NAD, well-groomed, well-developed  HEAD:  Atraumatic, Normocephalic  EYES: EOMI, PERRLA, conjunctiva and sclera clear  ENMT: No tonsillar erythema, exudates, or enlargement; Moist mucous membranes, Good dentition, No lesions  NECK: Supple, No JVD, Normal thyroid  NERVOUS SYSTEM:  Alert & Oriented X3, Good concentration; Motor Strength 5/5 B/L upper and lower extremities; DTRs 2+ intact and symmetric  CHEST/LUNG: Clear to percussion bilaterally; No rales, rhonchi, wheezing, or rubs  HEART: Regular rate and rhythm; No murmurs, rubs, or gallops  ABDOMEN: Soft, Nontender, Nondistended; Bowel sounds present  EXTREMITIES:  2+ Peripheral Pulses, No clubbing, cyanosis, or edema  LYMPH: No lymphadenopathy noted  SKIN: No rashes or lesions    Consultant(s) Notes Reviewed:  [x ] YES  [ ] NO  Care Discussed with Consultants/Other Providers [ x] YES  [ ] NO    LABS:                        11.7   7.1   )-----------( 319      ( 10 Nov 2017 06:46 )             37.2     11-10    139  |  96  |  35<H>  ----------------------------<  103<H>  3.9   |  25  |  3.77<H>    Ca    9.9      10 Nov 2017 06:46    TPro  7.9  /  Alb  4.3  /  TBili  0.5  /  DBili  x   /  AST  28  /  ALT  30  /  AlkPhos  125<H>  11-10    PT/INR - ( 10 Nov 2017 06:48 )   PT: 15.9 sec;   INR: 1.46 ratio         PTT - ( 10 Nov 2017 06:48 )  PTT:32.2 sec    CAPILLARY BLOOD GLUCOSE      POCT Blood Glucose.: 173 mg/dL (09 Nov 2017 21:06)  POCT Blood Glucose.: 201 mg/dL (09 Nov 2017 13:49)            RADIOLOGY & ADDITIONAL TESTS:    Imaging Personally Reviewed:  [ ] YES  [ ] NO TE[employer] 4 Medicine Intern: Oscar Lorenzo  Spectralink: [id number]  Pager: [phone]      Patient is a 72y old  Male who presents with a chief complaint of s/p cardiac cath (24 Oct 2017 15:51)      INTERVAL HPI/OVERNIGHT EVENTS: Overnight, blood was noted to be oozing from permacath site. However, this AM site is not actively bleeding from the site. Patient is anticoagulated on Coumadin but is not currently therapeutic. Patient is nonverbal with staff at baseline.      T(C): 36.7 (11-10-17 @ 05:02), Max: 37.1 (11-09-17 @ 16:50)  HR: 87 (11-10-17 @ 05:02) (87 - 101)  BP: 124/80 (11-10-17 @ 05:02) (124/80 - 157/89)  RR: 18 (11-10-17 @ 05:02) (17 - 18)  SpO2: 96% (11-10-17 @ 05:02) (94% - 100%)  Wt(kg): --Vital Signs Last 24 Hrs  T(C): 36.7 (10 Nov 2017 05:02), Max: 37.1 (09 Nov 2017 16:50)  T(F): 98.1 (10 Nov 2017 05:02), Max: 98.7 (09 Nov 2017 16:50)  HR: 87 (10 Nov 2017 05:02) (87 - 101)  BP: 124/80 (10 Nov 2017 05:02) (124/80 - 157/89)  BP(mean): --  RR: 18 (10 Nov 2017 05:02) (17 - 18)  SpO2: 96% (10 Nov 2017 05:02) (94% - 100%)    PHYSICAL EXAM:  GENERAL: NAD, well-developed, sleeping comfortably in bed  HEAD:  Atraumatic, Normocephalic  EYES: conjunctiva and sclera clear  NECK: Supple, No JVD  CHEST/LUNG: Clear to auscultation bilaterally; No wheeze  HEART: Regular rate and rhythm; holosytolic murmur, rubs, or gallops  ABDOMEN: Soft, Nontender, Nondistended; Bowel sounds present  EXTREMITIES:  2+ Peripheral Pulses, No clubbing, cyanosis, or edema  PSYCH: awake and alert, not following commands  SKIN: sacral decubitus stage 3 ulcer present, permacath site in R anterior chest wall no sign of infection/active bleeding/erythema dressing c/d/i, small blister on lateral edge of R thumb     Consultant(s) Notes Reviewed:  [x ] YES  [ ] NO  Care Discussed with Consultants/Other Providers [ x] YES  [ ] NO    LABS:                        11.7   7.1   )-----------( 319      ( 10 Nov 2017 06:46 )             37.2     11-10    139  |  96  |  35<H>  ----------------------------<  103<H>  3.9   |  25  |  3.77<H>    Ca    9.9      10 Nov 2017 06:46    TPro  7.9  /  Alb  4.3  /  TBili  0.5  /  DBili  x   /  AST  28  /  ALT  30  /  AlkPhos  125<H>  11-10    PT/INR - ( 10 Nov 2017 06:48 )   PT: 15.9 sec;   INR: 1.46 ratio         PTT - ( 10 Nov 2017 06:48 )  PTT:32.2 sec    CAPILLARY BLOOD GLUCOSE      POCT Blood Glucose.: 173 mg/dL (09 Nov 2017 21:06)  POCT Blood Glucose.: 201 mg/dL (09 Nov 2017 13:49)            RADIOLOGY & ADDITIONAL TESTS:    Imaging Personally Reviewed:  [ ] YES  [ ] NO

## 2017-11-10 NOTE — PROGRESS NOTE ADULT - PROBLEM SELECTOR PLAN 2
ESRD on HD, nephrology on board Dr. Elif Solis - HD T, Thu, Saturday via permcath.   - s/p permacath removal 11/6 and replacement on 11/9, received dialysis on 11/9  - next dialysis session to be on 11/10  - Vascular sx consult for AVF, plan for OR next Thursday

## 2017-11-10 NOTE — PROGRESS NOTE ADULT - SUBJECTIVE AND OBJECTIVE BOX
No distress, non-verbal    Vital Signs Last 24 Hrs  T(C): 37.8 (11-10-17 @ 12:00), Max: 37.8 (11-10-17 @ 12:00)  T(F): 100 (11-10-17 @ 12:00), Max: 100 (11-10-17 @ 12:00)  HR: 87 (11-10-17 @ 12:00) (87 - 101)  BP: 111/67 (11-10-17 @ 12:00) (96/64 - 137/80)  BP(mean): --  RR: 18 (11-10-17 @ 12:00) (17 - 18)  SpO2: 100% (11-10-17 @ 12:00) (96% - 100%)    Respiratory: b/l air entry, clear  Cardiovascular: S1 S2 regular  Gastrointestinal: soft, NT, BS present  Extremities: no edema     ALBUTerol    90 MICROgram(s) HFA Inhaler 2 Puff(s) Inhalation every 6 hours PRN  amiodarone    Tablet 200 milliGRAM(s) Oral daily  artificial  tears Solution 1 Drop(s) Both EYES four times a day  aspirin  chewable 81 milliGRAM(s) Oral daily  atorvastatin 40 milliGRAM(s) Oral at bedtime  BACItracin   Ointment 1 Application(s) Topical two times a day  busPIRone 5 milliGRAM(s) Oral two times a day  clopidogrel Tablet 75 milliGRAM(s) Oral daily  clotrimazole/betamethasone Cream 1 Application(s) Topical two times a day  dextrose 5%. 1000 milliLiter(s) IV Continuous <Continuous>  dextrose 50% Injectable 12.5 Gram(s) IV Push once  dextrose 50% Injectable 25 Gram(s) IV Push once  dextrose 50% Injectable 25 Gram(s) IV Push once  dextrose Gel 1 Dose(s) Oral once PRN  fluticasone propionate   110 MICROgram(s) HFA Inhaler 1 Puff(s) Inhalation daily  glucagon  Injectable 1 milliGRAM(s) IntraMuscular once PRN  insulin lispro (HumaLOG) corrective regimen sliding scale   SubCutaneous Before meals and at bedtime  Nephro-sophie 1 Tablet(s) Oral daily  pantoprazole   Suspension 40 milliGRAM(s) Oral before breakfast  tamsulosin 0.4 milliGRAM(s) Oral at bedtime  warfarin 3 milliGRAM(s) Oral once                          11.7   7.1   )-----------( 319      ( 10 Nov 2017 06:46 )             37.2     10 Nov 2017 06:46    139    |  96     |  35     ----------------------------<  103    3.9     |  25     |  3.77     Ca    9.9        10 Nov 2017 06:46    TPro  7.9    /  Alb  4.3    /  TBili  0.5    /  DBili  x      /  AST  28     /  ALT  30     /  AlkPhos  125    10 Nov 2017 06:46    LIVER FUNCTIONS - ( 10 Nov 2017 06:46 )  Alb: 4.3 g/dL / Pro: 7.9 g/dL / ALK PHOS: 125 U/L / ALT: 30 U/L RC / AST: 28 U/L / GGT: x           PT/INR - ( 10 Nov 2017 06:48 )   PT: 15.9 sec;   INR: 1.46 ratio      Assessment and Recommendation:     ESRD on HD  Adm w/bacteremia, initially thought to be contam, but repeat bld cx on 10/24 and 11/2 positive for CNS  Perm Cath d/c-d after HD 11/6  F/u blood cx are negative  S/p new perm cath 10/9  S/p stable HD 10/9 via new perm cath  Will continue HD TTS  AVF scheduled for 11/16   Abx per ID

## 2017-11-10 NOTE — PROGRESS NOTE ADULT - PROBLEM SELECTOR PLAN 1
Afebrile, No SOB. Cultures from 11/7 now negative x2 after 72 hours. s/p Permacath placement 11/9.   - s/p permacath removal 11/6  - c/w IV Vanco 500mg post HD (last dose yesterday 11/9 after HD))  - CT A/P negative, CXR negative

## 2017-11-10 NOTE — PROGRESS NOTE ADULT - ASSESSMENT
72/M with T2DM, HTN, HLD, Embolic CVA with right hemiplegia, COPD, bipolar, bilateral nephrolithiasis and bladder stones, ASHD, discharged 10/18 after long hospital stay when initially presented with NSTEMI/pulm edema, UTI, VT s/p shock, embolic CVA, new AF, re-admitted after hypotensive episode in dialysis found with bacteremia x2/ with 2 different organisms likely contaminants.  However, repeat blood culture done off antibiotics grew COANS and was restarted on Vancomycin per ID 72/M with T2DM, HTN, HLD, Embolic CVA with right hemiplegia, COPD, bipolar, bilateral nephrolithiasis and bladder stones, ASHD, discharged 10/18 after long hospital stay when initially presented with NSTEMI/pulm edema, UTI, VT s/p shock, embolic CVA, new AF, re-admitted after hypotensive episode in dialysis found with consistent bacteremia with COANS and was started on Vancomycin post HD s/p permacath exchange

## 2017-11-10 NOTE — PROGRESS NOTE ADULT - SUBJECTIVE AND OBJECTIVE BOX
Follow Up:      Interval History/ROS: resting comfortably    Allergies  No Known Allergies        ANTIMICROBIALS:      OTHER MEDS:  MEDICATIONS  (STANDING):  ALBUTerol    90 MICROgram(s) HFA Inhaler 2 every 6 hours PRN  amiodarone    Tablet 200 daily  aspirin  chewable 81 daily  atorvastatin 40 at bedtime  busPIRone 5 two times a day  clopidogrel Tablet 75 daily  dextrose 50% Injectable 12.5 once  dextrose 50% Injectable 25 once  dextrose 50% Injectable 25 once  dextrose Gel 1 once PRN  fluticasone propionate   110 MICROgram(s) HFA Inhaler 1 daily  glucagon  Injectable 1 once PRN  insulin lispro (HumaLOG) corrective regimen sliding scale  Before meals and at bedtime  pantoprazole   Suspension 40 before breakfast  tamsulosin 0.4 at bedtime  warfarin 3 once      Vital Signs Last 24 Hrs  T(C): 37.8 (10 Nov 2017 12:00), Max: 37.8 (10 Nov 2017 12:00)  T(F): 100 (10 Nov 2017 12:00), Max: 100 (10 Nov 2017 12:00)  HR: 87 (10 Nov 2017 12:00) (87 - 101)  BP: 111/67 (10 Nov 2017 12:00) (96/64 - 150/83)  BP(mean): --  RR: 18 (10 Nov 2017 12:00) (17 - 18)  SpO2: 100% (10 Nov 2017 12:00) (94% - 100%)    PHYSICAL EXAM:  General: WN/WD NAD, Non-toxic  Neurology: asleep  Respiratory: Clear to auscultation bilaterally, anterior chest  CV: RRR, S1S2, no murmurs, rubs or gallops  Abdominal: Soft, Non-tender, non-distended, normal bowel sounds  Extremities: No edema, + peripheral pulses  Line Sites: Clear left peripheral line - Left subclavian site dressing clean and dry  Skin: No rash                          11.7   7.1   )-----------( 319      ( 10 Nov 2017 06:46 )             37.2       11-10    139  |  96  |  35<H>  ----------------------------<  103<H>  3.9   |  25  |  3.77<H>    Ca    9.9      10 Nov 2017 06:46    TPro  7.9  /  Alb  4.3  /  TBili  0.5  /  DBili  x   /  AST  28  /  ALT  30  /  AlkPhos  125<H>  11-10          MICROBIOLOGY:  .Blood Blood-Peripheral  11-07-17   No growth to date.  --  --      .Catheter IV Dialysis Perm Cath  11-06-17   No growth at 48 hours  --  --      .Blood Blood  11-04-17   No growth at 5 days.  --  --      .Blood Blood-Peripheral  11-02-17   Growth in anaerobic bottle: Coag Negative Staphylococcus  Single set isolate, possible contaminant. Contact  Microbiology if susceptibility testing clinically  indicated.  --    Growth in anaerobic bottle: Gram Positive Cocci in Clusters      .Blood Blood-Peripheral  10-31-17   No growth at 5 days.  --  --      .Blood Blood-Peripheral  10-24-17   No growth at 5 days.  --  --      .Blood Blood  10-24-17   Growth in aerobic and anaerobic bottles: Coag Negative Staphylococcus  Single set isolate, possible contaminant. Contact  Microbiology if susceptibility testing clinically  indicated.  --    Growth in anaerobic bottle: Gram Positive Cocci in Clusters  Growth in aerobic bottle: Gram Positive Cocci in Clusters      .Blood Blood  10-22-17   No growth at 5 days.  --  --      .Blood Blood-Peripheral  10-22-17   No growth at 5 days.  --  --      .Blood Blood-Peripheral  10-21-17   Growth in aerobic bottle: Bacillus species not anthracis        RADIOLOGY:  < from: IR Procedure (11.09.17 @ 11:04) >  Impression: Successful placement of the right internal jugular vein   tunneled 19 cm tip to cuff hemodialysis catheter.    < end of copied text >        Eleazar Melgoza MD; Division of Infectious Disease; Pager: [phone]; nights and weekends: [phone]

## 2017-11-11 LAB
APTT BLD: 93.3 SEC — HIGH (ref 27.5–37.4)
GLUCOSE BLDC GLUCOMTR-MCNC: 149 MG/DL — HIGH (ref 70–99)
GLUCOSE BLDC GLUCOMTR-MCNC: 186 MG/DL — HIGH (ref 70–99)
GLUCOSE BLDC GLUCOMTR-MCNC: 190 MG/DL — HIGH (ref 70–99)
GLUCOSE BLDC GLUCOMTR-MCNC: 209 MG/DL — HIGH (ref 70–99)
HCT VFR BLD CALC: 36.2 % — LOW (ref 39–50)
HGB BLD-MCNC: 11.6 G/DL — LOW (ref 13–17)
MCHC RBC-ENTMCNC: 29.1 PG — SIGNIFICANT CHANGE UP (ref 27–34)
MCHC RBC-ENTMCNC: 32 GM/DL — SIGNIFICANT CHANGE UP (ref 32–36)
MCV RBC AUTO: 90.9 FL — SIGNIFICANT CHANGE UP (ref 80–100)
PLATELET # BLD AUTO: 312 K/UL — SIGNIFICANT CHANGE UP (ref 150–400)
RBC # BLD: 3.98 M/UL — LOW (ref 4.2–5.8)
RBC # FLD: 14.6 % — HIGH (ref 10.3–14.5)
WBC # BLD: 8.5 K/UL — SIGNIFICANT CHANGE UP (ref 3.8–10.5)
WBC # FLD AUTO: 8.5 K/UL — SIGNIFICANT CHANGE UP (ref 3.8–10.5)

## 2017-11-11 PROCEDURE — 99233 SBSQ HOSP IP/OBS HIGH 50: CPT | Mod: GC

## 2017-11-11 RX ORDER — HEPARIN SODIUM 5000 [USP'U]/ML
INJECTION INTRAVENOUS; SUBCUTANEOUS
Qty: 25000 | Refills: 0 | Status: DISCONTINUED | OUTPATIENT
Start: 2017-11-11 | End: 2017-11-16

## 2017-11-11 RX ORDER — VANCOMYCIN HCL 1 G
500 VIAL (EA) INTRAVENOUS ONCE
Qty: 0 | Refills: 0 | Status: COMPLETED | OUTPATIENT
Start: 2017-11-11 | End: 2017-11-11

## 2017-11-11 RX ORDER — HEPARIN SODIUM 5000 [USP'U]/ML
3000 INJECTION INTRAVENOUS; SUBCUTANEOUS EVERY 6 HOURS
Qty: 0 | Refills: 0 | Status: DISCONTINUED | OUTPATIENT
Start: 2017-11-11 | End: 2017-11-16

## 2017-11-11 RX ORDER — HEPARIN SODIUM 5000 [USP'U]/ML
6500 INJECTION INTRAVENOUS; SUBCUTANEOUS EVERY 6 HOURS
Qty: 0 | Refills: 0 | Status: DISCONTINUED | OUTPATIENT
Start: 2017-11-11 | End: 2017-11-16

## 2017-11-11 RX ADMIN — Medication 1 TABLET(S): at 12:12

## 2017-11-11 RX ADMIN — CLOTRIMAZOLE AND BETAMETHASONE DIPROPIONATE 1 APPLICATION(S): 10; .5 CREAM TOPICAL at 05:33

## 2017-11-11 RX ADMIN — Medication 1 APPLICATION(S): at 17:50

## 2017-11-11 RX ADMIN — Medication 5 MILLIGRAM(S): at 17:44

## 2017-11-11 RX ADMIN — Medication 1 PUFF(S): at 17:45

## 2017-11-11 RX ADMIN — AMIODARONE HYDROCHLORIDE 200 MILLIGRAM(S): 400 TABLET ORAL at 05:30

## 2017-11-11 RX ADMIN — Medication 100 MILLIGRAM(S): at 18:33

## 2017-11-11 RX ADMIN — Medication 1 DROP(S): at 05:30

## 2017-11-11 RX ADMIN — Medication 5 MILLIGRAM(S): at 05:30

## 2017-11-11 RX ADMIN — HEPARIN SODIUM 1400 UNIT(S)/HR: 5000 INJECTION INTRAVENOUS; SUBCUTANEOUS at 16:12

## 2017-11-11 RX ADMIN — Medication 81 MILLIGRAM(S): at 12:12

## 2017-11-11 RX ADMIN — Medication 1 DROP(S): at 03:14

## 2017-11-11 RX ADMIN — Medication 1: at 13:24

## 2017-11-11 RX ADMIN — CLOPIDOGREL BISULFATE 75 MILLIGRAM(S): 75 TABLET, FILM COATED ORAL at 12:12

## 2017-11-11 RX ADMIN — Medication 2: at 18:20

## 2017-11-11 RX ADMIN — ATORVASTATIN CALCIUM 40 MILLIGRAM(S): 80 TABLET, FILM COATED ORAL at 22:20

## 2017-11-11 RX ADMIN — Medication 1 DROP(S): at 12:13

## 2017-11-11 RX ADMIN — Medication 1 APPLICATION(S): at 05:30

## 2017-11-11 RX ADMIN — PANTOPRAZOLE SODIUM 40 MILLIGRAM(S): 20 TABLET, DELAYED RELEASE ORAL at 05:30

## 2017-11-11 RX ADMIN — TAMSULOSIN HYDROCHLORIDE 0.4 MILLIGRAM(S): 0.4 CAPSULE ORAL at 22:20

## 2017-11-11 RX ADMIN — Medication 1: at 22:20

## 2017-11-11 RX ADMIN — CLOTRIMAZOLE AND BETAMETHASONE DIPROPIONATE 1 APPLICATION(S): 10; .5 CREAM TOPICAL at 17:44

## 2017-11-11 RX ADMIN — Medication 1 DROP(S): at 17:43

## 2017-11-11 RX ADMIN — Medication 1 DROP(S): at 23:26

## 2017-11-11 NOTE — PROGRESS NOTE ADULT - ASSESSMENT
72/M with T2DM, HTN, HLD, Embolic CVA with right hemiplegia, COPD, bipolar, bilateral nephrolithiasis and bladder stones, ASHD, discharged 10/18 after long hospital stay when initially presented with NSTEMI/pulm edema, UTI, VT s/p shock, embolic CVA, new AF, re-admitted after hypotensive episode in dialysis found with consistent bacteremia with COANS and was started on Vancomycin post HD s/p permacath exchange

## 2017-11-11 NOTE — PROGRESS NOTE ADULT - PROBLEM SELECTOR PLAN 7
- holding coumadin in setting of possible procedures for permacath placement - holding coumadin in setting of possible procedures for permacath placement, heparin gtt for anticoagulation

## 2017-11-11 NOTE — PROGRESS NOTE ADULT - PROBLEM SELECTOR PLAN 2
ESRD on HD, nephrology on board Dr. Elif Solis - HD T, Thu, Saturday via permcath.   - s/p permacath removal 11/6 and replacement on 11/9, received dialysis on 11/9  - next dialysis session to be on 11/11  - Vascular sx consult for AVF, plan for OR next Thursday ESRD on HD, nephrology on board Dr. Elif Solis - HD T, Thu, Saturday via permcath.   - s/p permacath removal 11/6 and replacement on 11/9, received dialysis on 11/9  - next dialysis session to be on 11/11  - Vascular sx consult for AVF, plan for OR next Thursday - recommend holding coumadin and starting IV heparin gtt for anticoagulation

## 2017-11-11 NOTE — PROGRESS NOTE ADULT - SUBJECTIVE AND OBJECTIVE BOX
TEAM 4 Medicine Intern: Oscar Lorenzo  Spectralink: [phone]  Pager: [phone]      Patient is a 72y old  Male who presents with a chief complaint of s/p cardiac cath (24 Oct 2017 15:51)      INTERVAL HPI/OVERNIGHT EVENTS: Overnight, no acute events. Patient will go for dialysis later today and will dose vancomycin post HD. Patient nonverbal with staff at baseline. Permacath site with no signs of active bleeding, but with dried dark blood around.    T(C): 36.8 (11-11-17 @ 04:14), Max: 37.8 (11-10-17 @ 12:00)  HR: 93 (11-11-17 @ 04:14) (87 - 94)  BP: 126/82 (11-11-17 @ 04:14) (96/64 - 126/82)  RR: 18 (11-11-17 @ 04:14) (18 - 18)  SpO2: 96% (11-11-17 @ 04:14) (96% - 100%)  Wt(kg): --Vital Signs Last 24 Hrs  T(C): 36.8 (11 Nov 2017 04:14), Max: 37.8 (10 Nov 2017 12:00)  T(F): 98.2 (11 Nov 2017 04:14), Max: 100 (10 Nov 2017 12:00)  HR: 93 (11 Nov 2017 04:14) (87 - 94)  BP: 126/82 (11 Nov 2017 04:14) (96/64 - 126/82)  BP(mean): --  RR: 18 (11 Nov 2017 04:14) (18 - 18)  SpO2: 96% (11 Nov 2017 04:14) (96% - 100%)    PHYSICAL EXAM:  GENERAL: NAD, well-developed, sleeping comfortably in bed  HEAD:  Atraumatic, Normocephalic  EYES: conjunctiva and sclera clear  NECK: Supple, No JVD  CHEST/LUNG: Clear to auscultation bilaterally; No wheeze  HEART: Regular rate and rhythm; holosytolic murmur, rubs, or gallops  ABDOMEN: Soft, Nontender, Nondistended; Bowel sounds present  EXTREMITIES:  2+ Peripheral Pulses, No clubbing, cyanosis, or edema  PSYCH: awake and alert, not following commands  SKIN: sacral decubitus stage 3 ulcer present, permacath removal site in R anterior chest wall no sign of infection/bleeding/erythema, small blister on lateral edge of R thumb     Consultant(s) Notes Reviewed:  [x ] YES  [ ] NO  Care Discussed with Consultants/Other Providers [ x] YES  [ ] NO    LABS:                        11.7   7.1   )-----------( 319      ( 10 Nov 2017 06:46 )             37.2     11-10    139  |  96  |  35<H>  ----------------------------<  103<H>  3.9   |  25  |  3.77<H>    Ca    9.9      10 Nov 2017 06:46    TPro  7.9  /  Alb  4.3  /  TBili  0.5  /  DBili  x   /  AST  28  /  ALT  30  /  AlkPhos  125<H>  11-10    PT/INR - ( 10 Nov 2017 06:48 )   PT: 15.9 sec;   INR: 1.46 ratio         PTT - ( 10 Nov 2017 06:48 )  PTT:32.2 sec    CAPILLARY BLOOD GLUCOSE      POCT Blood Glucose.: 149 mg/dL (11 Nov 2017 08:06)  POCT Blood Glucose.: 221 mg/dL (10 Nov 2017 21:41)  POCT Blood Glucose.: 152 mg/dL (10 Nov 2017 18:08)  POCT Blood Glucose.: 193 mg/dL (10 Nov 2017 12:33)            RADIOLOGY & ADDITIONAL TESTS:    Imaging Personally Reviewed:  [ ] YES  [ ] NO

## 2017-11-11 NOTE — PROGRESS NOTE ADULT - ATTENDING COMMENTS
Patient seen and examined, agree with resident note, assessment, and plan (edited where appropriate). Patient is a 72yoM with multiple medical comorbidities, recent prolonged hospitalization for NSTEMI c/b CVA and new AF, who now presents with hypotension and is found to be bacteremic. Patient on vancomycin post-HD, dialysis catheter removed and then reinserted, plan for AVF next week. Patient seen and examined, agree with resident note, assessment, and plan (edited where appropriate). Patient is a 72yoM with multiple medical comorbidities, recent prolonged hospitalization for NSTEMI c/b CVA and new AF, who now presents with hypotension and is found to be bacteremic. Patient on vancomycin post-HD, dialysis catheter removed and then reinserted, plan for AVF next week. Coumadin being held for AVF procedure next week, started on heparin gtt for anticoagulation (CHADSVASC score of 7).

## 2017-11-11 NOTE — PROGRESS NOTE ADULT - PROBLEM SELECTOR PLAN 1
Afebrile, No SOB. Cultures from 11/7negative x2 after 72 hours. s/p Permacath placement 11/9.   - s/p permacath removal 11/6  - c/w IV Vanco 500mg post HD (last dose yesterday 11/9 after HD), will get another dose today 11/11 after HD. Last day of tx is November 23rd.  - CT A/P negative, CXR negative

## 2017-11-11 NOTE — PROGRESS NOTE ADULT - SUBJECTIVE AND OBJECTIVE BOX
Pt being fed by nursing staff. s/p HD today.     MEDICATIONS  (STANDING):  amiodarone    Tablet 200 milliGRAM(s) Oral daily  artificial  tears Solution 1 Drop(s) Both EYES four times a day  aspirin  chewable 81 milliGRAM(s) Oral daily  atorvastatin 40 milliGRAM(s) Oral at bedtime  BACItracin   Ointment 1 Application(s) Topical two times a day  busPIRone 5 milliGRAM(s) Oral two times a day  clopidogrel Tablet 75 milliGRAM(s) Oral daily  clotrimazole/betamethasone Cream 1 Application(s) Topical two times a day  dextrose 5%. 1000 milliLiter(s) (50 mL/Hr) IV Continuous <Continuous>  dextrose 50% Injectable 12.5 Gram(s) IV Push once  dextrose 50% Injectable 25 Gram(s) IV Push once  dextrose 50% Injectable 25 Gram(s) IV Push once  fluticasone propionate   110 MICROgram(s) HFA Inhaler 1 Puff(s) Inhalation daily  insulin lispro (HumaLOG) corrective regimen sliding scale   SubCutaneous Before meals and at bedtime  Nephro-sophie 1 Tablet(s) Oral daily  pantoprazole   Suspension 40 milliGRAM(s) Oral before breakfast  tamsulosin 0.4 milliGRAM(s) Oral at bedtime    MEDICATIONS  (PRN):  ALBUTerol    90 MICROgram(s) HFA Inhaler 2 Puff(s) Inhalation every 6 hours PRN Shortness of Breath and/or Wheezing  dextrose Gel 1 Dose(s) Oral once PRN Blood Glucose LESS THAN 70 milliGRAM(s)/deciliter  glucagon  Injectable 1 milliGRAM(s) IntraMuscular once PRN Glucose LESS THAN 70 milligrams/deciliter    T(C): 36.8 (11-11-17 @ 12:28), Max: 37.8 (11-10-17 @ 12:00)  HR: 108 (11-11-17 @ 12:28) (53 - 108)  BP: 115/77 (11-11-17 @ 12:28) (96/64 - 150/83)  RR: 18 (11-11-17 @ 12:28)  SpO2: 100% (11-11-17 @ 12:28)  Wt(kg): --  I&O's Detail    10 Nov 2017 07:01  -  11 Nov 2017 07:00  --------------------------------------------------------  IN:    Oral Fluid: 960 mL  Total IN: 960 mL    OUT:  Total OUT: 0 mL    Total NET: 960 mL      11 Nov 2017 07:01  -  11 Nov 2017 13:53  --------------------------------------------------------  IN:  Total IN: 0 mL    OUT:    Other: 1000 mL  Total OUT: 1000 mL    Total NET: -1000 mL        General: Rt chest dialysis catheter  Respiratory: b/l air entry, clear  Cardiovascular: S1 S2 regular  Gastrointestinal: soft, NT, BS present  Extremities: no edema       LABORATORY:                        11.7   7.1   )-----------( 319      ( 10 Nov 2017 06:46 )             37.2     11-10    139  |  96  |  35<H>  ----------------------------<  103<H>  3.9   |  25  |  3.77<H>    Ca    9.9      10 Nov 2017 06:46    TPro  7.9  /  Alb  4.3  /  TBili  0.5  /  DBili  x   /  AST  28  /  ALT  30  /  AlkPhos  125<H>  11-10    Sodium, Serum: 139 mmol/L (11-10 @ 06:46)    Potassium, Serum: 3.9 mmol/L (11-10 @ 06:46)    Hemoglobin: 11.7 g/dL (11-10 @ 06:46)  Hemoglobin: 11.0 g/dL (11-09 @ 06:46)    Creatinine, Serum 3.77 (11-10 @ 06:46)  Creatinine, Serum 6.02 (11-09 @ 06:46)        LIVER FUNCTIONS - ( 10 Nov 2017 06:46 )  Alb: 4.3 g/dL / Pro: 7.9 g/dL / ALK PHOS: 125 U/L / ALT: 30 U/L RC / AST: 28 U/L / GGT: x                 Assessment and Recommendation:     ESRD on HD  Adm w/bacteremia, initially thought to be contaminant, but repeat bld cx on 10/24 and 11/2 positive for CNS  Perm Cath d/c-d after HD 11/6. F/u blood cx are negative  S/p new perm cath 10/9. S/p stable HD 10/9 via new perm cath  HD today. Tolerated HD today.   Will continue HD TTS. AVF scheduled for 11/16. Abx per ID.

## 2017-11-11 NOTE — PROVIDER CONTACT NOTE (OTHER) - ASSESSMENT
pt is confused pt  had  diaylsis today  pt  perma cath site   has  small amt of  pt  appears to be uncomfortable

## 2017-11-12 LAB
ANION GAP SERPL CALC-SCNC: 20 MMOL/L — HIGH (ref 5–17)
APTT BLD: 87.5 SEC — HIGH (ref 27.5–37.4)
BUN SERPL-MCNC: 40 MG/DL — HIGH (ref 7–23)
CALCIUM SERPL-MCNC: 9.8 MG/DL — SIGNIFICANT CHANGE UP (ref 8.4–10.5)
CHLORIDE SERPL-SCNC: 97 MMOL/L — SIGNIFICANT CHANGE UP (ref 96–108)
CO2 SERPL-SCNC: 22 MMOL/L — SIGNIFICANT CHANGE UP (ref 22–31)
CREAT SERPL-MCNC: 4.29 MG/DL — HIGH (ref 0.5–1.3)
CULTURE RESULTS: SIGNIFICANT CHANGE UP
CULTURE RESULTS: SIGNIFICANT CHANGE UP
GLUCOSE BLDC GLUCOMTR-MCNC: 125 MG/DL — HIGH (ref 70–99)
GLUCOSE BLDC GLUCOMTR-MCNC: 155 MG/DL — HIGH (ref 70–99)
GLUCOSE BLDC GLUCOMTR-MCNC: 169 MG/DL — HIGH (ref 70–99)
GLUCOSE BLDC GLUCOMTR-MCNC: 207 MG/DL — HIGH (ref 70–99)
GLUCOSE SERPL-MCNC: 172 MG/DL — HIGH (ref 70–99)
HCT VFR BLD CALC: 35.6 % — LOW (ref 39–50)
HGB BLD-MCNC: 11.4 G/DL — LOW (ref 13–17)
INR BLD: 1.51 RATIO — HIGH (ref 0.88–1.16)
MCHC RBC-ENTMCNC: 29.3 PG — SIGNIFICANT CHANGE UP (ref 27–34)
MCHC RBC-ENTMCNC: 32.2 GM/DL — SIGNIFICANT CHANGE UP (ref 32–36)
MCV RBC AUTO: 90.9 FL — SIGNIFICANT CHANGE UP (ref 80–100)
PLATELET # BLD AUTO: 310 K/UL — SIGNIFICANT CHANGE UP (ref 150–400)
POTASSIUM SERPL-MCNC: 3.9 MMOL/L — SIGNIFICANT CHANGE UP (ref 3.5–5.3)
POTASSIUM SERPL-SCNC: 3.9 MMOL/L — SIGNIFICANT CHANGE UP (ref 3.5–5.3)
PROTHROM AB SERPL-ACNC: 16.5 SEC — HIGH (ref 9.8–12.7)
RBC # BLD: 3.91 M/UL — LOW (ref 4.2–5.8)
RBC # FLD: 14.4 % — SIGNIFICANT CHANGE UP (ref 10.3–14.5)
SODIUM SERPL-SCNC: 139 MMOL/L — SIGNIFICANT CHANGE UP (ref 135–145)
SPECIMEN SOURCE: SIGNIFICANT CHANGE UP
SPECIMEN SOURCE: SIGNIFICANT CHANGE UP
WBC # BLD: 7.8 K/UL — SIGNIFICANT CHANGE UP (ref 3.8–10.5)
WBC # FLD AUTO: 7.8 K/UL — SIGNIFICANT CHANGE UP (ref 3.8–10.5)

## 2017-11-12 PROCEDURE — 99233 SBSQ HOSP IP/OBS HIGH 50: CPT | Mod: GC

## 2017-11-12 RX ADMIN — Medication 1 DROP(S): at 05:46

## 2017-11-12 RX ADMIN — CLOTRIMAZOLE AND BETAMETHASONE DIPROPIONATE 1 APPLICATION(S): 10; .5 CREAM TOPICAL at 17:18

## 2017-11-12 RX ADMIN — CLOTRIMAZOLE AND BETAMETHASONE DIPROPIONATE 1 APPLICATION(S): 10; .5 CREAM TOPICAL at 05:48

## 2017-11-12 RX ADMIN — AMIODARONE HYDROCHLORIDE 200 MILLIGRAM(S): 400 TABLET ORAL at 05:47

## 2017-11-12 RX ADMIN — Medication 1 APPLICATION(S): at 05:47

## 2017-11-12 RX ADMIN — Medication 81 MILLIGRAM(S): at 10:08

## 2017-11-12 RX ADMIN — Medication 1 APPLICATION(S): at 17:19

## 2017-11-12 RX ADMIN — ATORVASTATIN CALCIUM 40 MILLIGRAM(S): 80 TABLET, FILM COATED ORAL at 22:36

## 2017-11-12 RX ADMIN — Medication 1: at 09:25

## 2017-11-12 RX ADMIN — TAMSULOSIN HYDROCHLORIDE 0.4 MILLIGRAM(S): 0.4 CAPSULE ORAL at 22:36

## 2017-11-12 RX ADMIN — Medication 1: at 12:50

## 2017-11-12 RX ADMIN — Medication 1 DROP(S): at 17:17

## 2017-11-12 RX ADMIN — Medication 1 DROP(S): at 12:50

## 2017-11-12 RX ADMIN — Medication 5 MILLIGRAM(S): at 05:47

## 2017-11-12 RX ADMIN — Medication 5 MILLIGRAM(S): at 17:18

## 2017-11-12 RX ADMIN — HEPARIN SODIUM 1400 UNIT(S)/HR: 5000 INJECTION INTRAVENOUS; SUBCUTANEOUS at 01:28

## 2017-11-12 RX ADMIN — Medication 2: at 18:38

## 2017-11-12 RX ADMIN — PANTOPRAZOLE SODIUM 40 MILLIGRAM(S): 20 TABLET, DELAYED RELEASE ORAL at 05:47

## 2017-11-12 RX ADMIN — Medication 1 PUFF(S): at 17:19

## 2017-11-12 RX ADMIN — Medication 1 TABLET(S): at 10:09

## 2017-11-12 RX ADMIN — CLOPIDOGREL BISULFATE 75 MILLIGRAM(S): 75 TABLET, FILM COATED ORAL at 10:08

## 2017-11-12 RX ADMIN — HEPARIN SODIUM 1400 UNIT(S)/HR: 5000 INJECTION INTRAVENOUS; SUBCUTANEOUS at 07:22

## 2017-11-12 NOTE — PROGRESS NOTE ADULT - SUBJECTIVE AND OBJECTIVE BOX
Dr. Nette Martinez PGY-2 Pager [phone]      Patient is a 72y old  Male who presents with a chief complaint of s/p cardiac cath (24 Oct 2017 15:51)      INTERVAL HPI/OVERNIGHT EVENTS: No acute events overnight. Was informed by RN this morning that pt's permacath site has been gradually oozing, however is only requiring a change in dressing once in 12 hours. Patient otherwise is at baseline. Patient is arousable to his name. No complaints at this time.       REVIEW OF SYSTEMS:  See HPI     T(C): 37.2 (11-12-17 @ 04:46), Max: 37.2 (11-12-17 @ 04:46)  HR: 98 (11-12-17 @ 04:46) (53 - 108)  BP: 142/88 (11-12-17 @ 04:46) (107/70 - 142/88)  RR: 18 (11-12-17 @ 04:46) (16 - 18)  SpO2: 99% (11-12-17 @ 04:46) (96% - 100%)  Wt(kg): --Vital Signs Last 24 Hrs  T(C): 37.2 (12 Nov 2017 04:46), Max: 37.2 (12 Nov 2017 04:46)  T(F): 99 (12 Nov 2017 04:46), Max: 99 (12 Nov 2017 04:46)  HR: 98 (12 Nov 2017 04:46) (53 - 108)  BP: 142/88 (12 Nov 2017 04:46) (107/70 - 142/88)  BP(mean): --  RR: 18 (12 Nov 2017 04:46) (16 - 18)  SpO2: 99% (12 Nov 2017 04:46) (96% - 100%)    PHYSICAL EXAM:  GENERAL: NAD, well-developed, sleeping comfortably in bed  HEAD:  Atraumatic, Normocephalic  EYES: conjunctiva and sclera clear  NECK: Supple, No JVD  CHEST/LUNG: Clear to auscultation bilaterally; No wheeze  HEART: Regular rate and rhythm; holosytolic murmur, rubs, or gallops  ABDOMEN: Soft, Nontender, Nondistended; Bowel sounds present  EXTREMITIES:  2+ Peripheral Pulses, No clubbing, cyanosis, or edema  PSYCH: awake and alert, not following commands  SKIN: sacral decubitus stage 3 ulcer present, there is oozing at the permacath site, with dressing and gauze in place, not completely saturated.   LABS:                        11.4   7.8   )-----------( 310      ( 12 Nov 2017 06:34 )             35.6     11-12    139  |  97  |  40<H>  ----------------------------<  172<H>  3.9   |  22  |  4.29<H>    Ca    9.8      12 Nov 2017 06:34      PT/INR - ( 12 Nov 2017 06:34 )   PT: 16.5 sec;   INR: 1.51 ratio         PTT - ( 12 Nov 2017 06:34 )  PTT:87.5 sec    CAPILLARY BLOOD GLUCOSE      POCT Blood Glucose.: 190 mg/dL (11 Nov 2017 21:10)  POCT Blood Glucose.: 209 mg/dL (11 Nov 2017 18:04)  POCT Blood Glucose.: 186 mg/dL (11 Nov 2017 13:09)            RADIOLOGY & ADDITIONAL TESTS:    Imaging Personally Reviewed:  [ ] YES  [ ] NO

## 2017-11-12 NOTE — PROGRESS NOTE ADULT - PROBLEM SELECTOR PLAN 7
- holding coumadin in setting of possible procedures for permacath placement, heparin gtt for anticoagulation

## 2017-11-12 NOTE — PROGRESS NOTE ADULT - PROBLEM SELECTOR PLAN 2
ESRD on HD, nephrology on board Dr. Elif Solis - HD T, Thu, Saturday via permcath.   - s/p permacath removal 11/6 and replacement on 11/9, received dialysis on 11/9  - next dialysis session to be on Tuesday.   - With regard to permacath oozing, will continue to monitor with dressing changes, unlikely IR will intervene urgently. If continues to worsen, will touch base with them tomorrow.   - Vascular sx consult for AVF, plan for OR next Thursday - recommend holding coumadin and starting IV heparin gtt for anticoagulation

## 2017-11-12 NOTE — PROGRESS NOTE ADULT - PROBLEM SELECTOR PLAN 1
Afebrile, No SOB. Cultures from 11/7negative x2 after 72 hours. s/p Permacath placement 11/9.   - s/p permacath removal 11/6  - c/w IV Vanco 500mg post HD (last dose yesterday 11/11 after HD). Last day of tx is November 23rd.  - CT A/P negative, CXR negative

## 2017-11-12 NOTE — PROGRESS NOTE ADULT - ATTENDING COMMENTS
Patient seen and examined, agree with resident note, assessment, and plan (edited where appropriate). Patient is a 72yoM with multiple medical comorbidities, recent prolonged hospitalization for NSTEMI c/b CVA and new AF, who now presents with hypotension and is found to be bacteremic. Patient on vancomycin post-HD, dialysis catheter removed and then reinserted, plan for AVF next week. Coumadin being held for AVF procedure next week, started on heparin gtt for anticoagulation (CHADSVASC score of 7) yesterday. Patient currently with oozing from permacath site but H/H is fairly stable and patient is hemodynamically stable - will continue to monitor closely.

## 2017-11-13 DIAGNOSIS — Z29.9 ENCOUNTER FOR PROPHYLACTIC MEASURES, UNSPECIFIED: ICD-10-CM

## 2017-11-13 LAB
ANION GAP SERPL CALC-SCNC: 23 MMOL/L — HIGH (ref 5–17)
APTT BLD: 88 SEC — HIGH (ref 27.5–37.4)
BUN SERPL-MCNC: 60 MG/DL — HIGH (ref 7–23)
CALCIUM SERPL-MCNC: 9.5 MG/DL — SIGNIFICANT CHANGE UP (ref 8.4–10.5)
CHLORIDE SERPL-SCNC: 95 MMOL/L — LOW (ref 96–108)
CO2 SERPL-SCNC: 20 MMOL/L — LOW (ref 22–31)
CREAT SERPL-MCNC: 5.7 MG/DL — HIGH (ref 0.5–1.3)
GLUCOSE BLDC GLUCOMTR-MCNC: 115 MG/DL — HIGH (ref 70–99)
GLUCOSE BLDC GLUCOMTR-MCNC: 134 MG/DL — HIGH (ref 70–99)
GLUCOSE BLDC GLUCOMTR-MCNC: 188 MG/DL — HIGH (ref 70–99)
GLUCOSE BLDC GLUCOMTR-MCNC: 242 MG/DL — HIGH (ref 70–99)
GLUCOSE SERPL-MCNC: 139 MG/DL — HIGH (ref 70–99)
HCT VFR BLD CALC: 31.7 % — LOW (ref 39–50)
HGB BLD-MCNC: 10.3 G/DL — LOW (ref 13–17)
INR BLD: 1.54 RATIO — HIGH (ref 0.88–1.16)
MCHC RBC-ENTMCNC: 29.3 PG — SIGNIFICANT CHANGE UP (ref 27–34)
MCHC RBC-ENTMCNC: 32.5 GM/DL — SIGNIFICANT CHANGE UP (ref 32–36)
MCV RBC AUTO: 90.3 FL — SIGNIFICANT CHANGE UP (ref 80–100)
PLATELET # BLD AUTO: 292 K/UL — SIGNIFICANT CHANGE UP (ref 150–400)
POTASSIUM SERPL-MCNC: 3.9 MMOL/L — SIGNIFICANT CHANGE UP (ref 3.5–5.3)
POTASSIUM SERPL-SCNC: 3.9 MMOL/L — SIGNIFICANT CHANGE UP (ref 3.5–5.3)
PROTHROM AB SERPL-ACNC: 16.9 SEC — HIGH (ref 9.8–12.7)
RBC # BLD: 3.52 M/UL — LOW (ref 4.2–5.8)
RBC # FLD: 14.2 % — SIGNIFICANT CHANGE UP (ref 10.3–14.5)
SODIUM SERPL-SCNC: 138 MMOL/L — SIGNIFICANT CHANGE UP (ref 135–145)
WBC # BLD: 7.8 K/UL — SIGNIFICANT CHANGE UP (ref 3.8–10.5)
WBC # FLD AUTO: 7.8 K/UL — SIGNIFICANT CHANGE UP (ref 3.8–10.5)

## 2017-11-13 PROCEDURE — 99232 SBSQ HOSP IP/OBS MODERATE 35: CPT

## 2017-11-13 PROCEDURE — 99233 SBSQ HOSP IP/OBS HIGH 50: CPT | Mod: GC

## 2017-11-13 RX ORDER — VANCOMYCIN HCL 1 G
500 VIAL (EA) INTRAVENOUS ONCE
Qty: 0 | Refills: 0 | Status: COMPLETED | OUTPATIENT
Start: 2017-11-13 | End: 2017-11-14

## 2017-11-13 RX ORDER — ASCORBIC ACID 60 MG
500 TABLET,CHEWABLE ORAL
Qty: 0 | Refills: 0 | Status: DISCONTINUED | OUTPATIENT
Start: 2017-11-13 | End: 2017-11-16

## 2017-11-13 RX ORDER — ERYTHROPOIETIN 10000 [IU]/ML
10000 INJECTION, SOLUTION INTRAVENOUS; SUBCUTANEOUS EVERY OTHER DAY
Qty: 0 | Refills: 0 | Status: DISCONTINUED | OUTPATIENT
Start: 2017-11-13 | End: 2017-11-16

## 2017-11-13 RX ADMIN — CLOPIDOGREL BISULFATE 75 MILLIGRAM(S): 75 TABLET, FILM COATED ORAL at 09:56

## 2017-11-13 RX ADMIN — Medication 1 TABLET(S): at 09:56

## 2017-11-13 RX ADMIN — Medication 1 DROP(S): at 17:43

## 2017-11-13 RX ADMIN — Medication 500 MILLIGRAM(S): at 17:47

## 2017-11-13 RX ADMIN — CLOTRIMAZOLE AND BETAMETHASONE DIPROPIONATE 1 APPLICATION(S): 10; .5 CREAM TOPICAL at 05:38

## 2017-11-13 RX ADMIN — Medication 1 APPLICATION(S): at 05:38

## 2017-11-13 RX ADMIN — ERYTHROPOIETIN 10000 UNIT(S): 10000 INJECTION, SOLUTION INTRAVENOUS; SUBCUTANEOUS at 20:17

## 2017-11-13 RX ADMIN — Medication 5 MILLIGRAM(S): at 17:43

## 2017-11-13 RX ADMIN — AMIODARONE HYDROCHLORIDE 200 MILLIGRAM(S): 400 TABLET ORAL at 05:37

## 2017-11-13 RX ADMIN — HEPARIN SODIUM 1400 UNIT(S)/HR: 5000 INJECTION INTRAVENOUS; SUBCUTANEOUS at 08:03

## 2017-11-13 RX ADMIN — ATORVASTATIN CALCIUM 40 MILLIGRAM(S): 80 TABLET, FILM COATED ORAL at 23:14

## 2017-11-13 RX ADMIN — Medication 2: at 23:16

## 2017-11-13 RX ADMIN — Medication 1: at 13:36

## 2017-11-13 RX ADMIN — Medication 1 DROP(S): at 23:14

## 2017-11-13 RX ADMIN — Medication 1 PUFF(S): at 17:44

## 2017-11-13 RX ADMIN — CLOTRIMAZOLE AND BETAMETHASONE DIPROPIONATE 1 APPLICATION(S): 10; .5 CREAM TOPICAL at 17:44

## 2017-11-13 RX ADMIN — Medication 1 APPLICATION(S): at 17:43

## 2017-11-13 RX ADMIN — Medication 5 MILLIGRAM(S): at 05:37

## 2017-11-13 RX ADMIN — Medication 1 DROP(S): at 05:38

## 2017-11-13 RX ADMIN — PANTOPRAZOLE SODIUM 40 MILLIGRAM(S): 20 TABLET, DELAYED RELEASE ORAL at 05:37

## 2017-11-13 RX ADMIN — Medication 1 DROP(S): at 03:40

## 2017-11-13 RX ADMIN — TAMSULOSIN HYDROCHLORIDE 0.4 MILLIGRAM(S): 0.4 CAPSULE ORAL at 23:14

## 2017-11-13 RX ADMIN — Medication 1 DROP(S): at 13:36

## 2017-11-13 RX ADMIN — Medication 81 MILLIGRAM(S): at 09:56

## 2017-11-13 NOTE — PROGRESS NOTE ADULT - PROBLEM SELECTOR PLAN 2
ESRD on HD, nephrology on board Dr. Elif Solis - HD T, Thu, Saturday via permcath.   - s/p permacath removal 11/6 and replacement on 11/9, received dialysis on 11/9  - next dialysis session to be on Tuesday 11/14.   - Permacath noted to have been oozing yesterday, will continue to monitor with dressing changes, unlikely IR will intervene urgently. If continues to worsen, will touch base with them.   - Vascular sx planning for AVF on Thursday 11/16 - coumadin being held and IV heparin gtt in place for anticoagulation ESRD on HD, nephrology on board Dr. Elif Solis - HD T, Thu, Saturday via permcath.   - s/p permacath removal 11/6 and replacement on 11/9, received dialysis on 11/9  - next dialysis session to be on Tuesday 11/14.   - Permacath continuing to ooze clotted blood, will continue to monitor with dressing changes, unlikely IR will intervene urgently. If continues to worsen, will touch base with them.   - Vascular sx planning for AVF on Thursday 11/16 - coumadin being held and IV heparin gtt in place for anticoagulation ESRD on HD, nephrology on board Dr. Elif Solis - HD T, Thu, Saturday via permcath.   - s/p permacath removal 11/6 and replacement on 11/9, received dialysis on 11/9  - next dialysis session to be on Tuesday 11/14.   - Permacath continuing to ooze clotted blood, though improving, will continue to monitor with dressing changes, unlikely IR will intervene urgently. If continues to worsen, will touch base with them.   - Vascular sx planning for AVF placement in OR on Thursday 11/16 - coumadin being held and IV heparin gtt in place for anticoagulation, to be held morning of procedure (pt to be kept NPO as of midnight on 11/14, w/ preoperative lab work ordered for AM of 11/14)

## 2017-11-13 NOTE — PROGRESS NOTE ADULT - SUBJECTIVE AND OBJECTIVE BOX
No distress, non-verbal    Vital Signs Last 24 Hrs  T(C): 37.1 (11-13-17 @ 13:10), Max: 37.2 (11-13-17 @ 09:00)  T(F): 98.8 (11-13-17 @ 13:10), Max: 98.9 (11-13-17 @ 09:00)  HR: 84 (11-13-17 @ 13:10) (84 - 94)  BP: 125/80 (11-13-17 @ 13:10) (103/72 - 143/814)  BP(mean): --  RR: 18 (11-13-17 @ 13:10) (17 - 18)  SpO2: 98% (11-13-17 @ 13:10) (96% - 99%)    Respiratory: b/l air entry, clear  Cardiovascular: S1 S2 regular  Gastrointestinal: soft, NT, BS present  Extremities: no edema     ALBUTerol    90 MICROgram(s) HFA Inhaler 2 Puff(s) Inhalation every 6 hours PRN  amiodarone    Tablet 200 milliGRAM(s) Oral daily  artificial  tears Solution 1 Drop(s) Both EYES four times a day  ascorbic acid 500 milliGRAM(s) Oral two times a day  aspirin  chewable 81 milliGRAM(s) Oral daily  atorvastatin 40 milliGRAM(s) Oral at bedtime  BACItracin   Ointment 1 Application(s) Topical two times a day  busPIRone 5 milliGRAM(s) Oral two times a day  clopidogrel Tablet 75 milliGRAM(s) Oral daily  clotrimazole/betamethasone Cream 1 Application(s) Topical two times a day  dextrose 5%. 1000 milliLiter(s) IV Continuous <Continuous>  dextrose 50% Injectable 12.5 Gram(s) IV Push once  dextrose 50% Injectable 25 Gram(s) IV Push once  dextrose 50% Injectable 25 Gram(s) IV Push once  dextrose Gel 1 Dose(s) Oral once PRN  fluticasone propionate   110 MICROgram(s) HFA Inhaler 1 Puff(s) Inhalation daily  glucagon  Injectable 1 milliGRAM(s) IntraMuscular once PRN  heparin  Infusion.  Unit(s)/Hr IV Continuous <Continuous>  heparin  Injectable 6500 Unit(s) IV Push every 6 hours PRN  heparin  Injectable 3000 Unit(s) IV Push every 6 hours PRN  insulin lispro (HumaLOG) corrective regimen sliding scale   SubCutaneous Before meals and at bedtime  Nephro-sophie 1 Tablet(s) Oral daily  pantoprazole   Suspension 40 milliGRAM(s) Oral before breakfast  tamsulosin 0.4 milliGRAM(s) Oral at bedtime                          10.3   7.8   )-----------( 292      ( 13 Nov 2017 06:55 )             31.7     13 Nov 2017 06:55    138    |  95     |  60     ----------------------------<  139    3.9     |  20     |  5.70     Ca    9.5        13 Nov 2017 06:55       Assessment and Recommendation:     ESRD on HD  Adm w/bacteremia, initially thought to be contam, but repeat bld cx on 10/24 and 11/2 positive for CNS  Perm Cath d/c-d after HD 11/6  F/u blood cx are negative  S/p new perm cath 10/9  Will continue HD MWF  AVF scheduled for 11/16   Abx per ID

## 2017-11-13 NOTE — PROGRESS NOTE ADULT - SUBJECTIVE AND OBJECTIVE BOX
SUBJECTIVE: No acute events. Patient receiving dialysis via RIJ catheter.    OBJECTIVE    T(C): 37.2 (11-13-17 @ 09:00), Max: 37.2 (11-13-17 @ 09:00)  HR: 91 (11-13-17 @ 09:00) (87 - 94)  BP: 127/77 (11-13-17 @ 09:00) (103/72 - 143/814)  RR: 18 (11-13-17 @ 09:00) (17 - 18)  SpO2: 98% (11-13-17 @ 09:00) (96% - 100%)    EXAM  General: NAD  Extremities: RUE in splint, warm, well-perfused; dried blood at RIJ catheter insertion site    LABS                        10.3   7.8   )-----------( 292      ( 13 Nov 2017 06:55 )             31.7     11-13    138  |  95<L>  |  60<H>  ----------------------------<  139<H>  3.9   |  20<L>  |  5.70<H>    Ca    9.5      13 Nov 2017 06:55      PT/INR - ( 13 Nov 2017 06:55 )   PT: 16.9 sec;   INR: 1.54 ratio      PTT - ( 13 Nov 2017 06:55 )  PTT:88.0 sec    RADIOLOGY & ADDITIONAL STUDIES  < from: [facility] Duplex Upper Ext Vein Scan, Bilat (11.07.17 @ 11:53) >     Right Cephalic Vein   NOT visualized       Right Basilic Vein   mid   0.27 cm  distal   0.22  ACF    0.11   prox forearm  0.06  mid      not visualized   distal   not visualized        Left Cephalic Vein   NOT visualized       Left Basilic Vein   prox upper arm  0.22 cm   mid   0.18  distal   0.08  ACF    0.03  prox forearm  0.06  mid   0.06  distal    not visualized     < end of copied text >

## 2017-11-13 NOTE — PROGRESS NOTE ADULT - SUBJECTIVE AND OBJECTIVE BOX
Patient is a 72y old  Male who presents with a chief complaint of s/p cardiac cath (24 Oct 2017 15:51)      SUBJECTIVE / OVERNIGHT EVENTS:  Patient seen and examined at bedside. No acute events overnight. Patient non-verbal with housestaff, therefore unable to obtain ROS.    MEDICATIONS  (STANDING):  amiodarone    Tablet 200 milliGRAM(s) Oral daily  artificial  tears Solution 1 Drop(s) Both EYES four times a day  aspirin  chewable 81 milliGRAM(s) Oral daily  atorvastatin 40 milliGRAM(s) Oral at bedtime  BACItracin   Ointment 1 Application(s) Topical two times a day  busPIRone 5 milliGRAM(s) Oral two times a day  clopidogrel Tablet 75 milliGRAM(s) Oral daily  clotrimazole/betamethasone Cream 1 Application(s) Topical two times a day  dextrose 5%. 1000 milliLiter(s) (50 mL/Hr) IV Continuous <Continuous>  dextrose 50% Injectable 12.5 Gram(s) IV Push once  dextrose 50% Injectable 25 Gram(s) IV Push once  dextrose 50% Injectable 25 Gram(s) IV Push once  fluticasone propionate   110 MICROgram(s) HFA Inhaler 1 Puff(s) Inhalation daily  heparin  Infusion.  Unit(s)/Hr (14 mL/Hr) IV Continuous <Continuous>  insulin lispro (HumaLOG) corrective regimen sliding scale   SubCutaneous Before meals and at bedtime  Nephro-sophie 1 Tablet(s) Oral daily  pantoprazole   Suspension 40 milliGRAM(s) Oral before breakfast  tamsulosin 0.4 milliGRAM(s) Oral at bedtime    MEDICATIONS  (PRN):  ALBUTerol    90 MICROgram(s) HFA Inhaler 2 Puff(s) Inhalation every 6 hours PRN Shortness of Breath and/or Wheezing  dextrose Gel 1 Dose(s) Oral once PRN Blood Glucose LESS THAN 70 milliGRAM(s)/deciliter  glucagon  Injectable 1 milliGRAM(s) IntraMuscular once PRN Glucose LESS THAN 70 milligrams/deciliter  heparin  Injectable 6500 Unit(s) IV Push every 6 hours PRN For aPTT less than 40  heparin  Injectable 3000 Unit(s) IV Push every 6 hours PRN For aPTT between 40 - 57      Vital Signs Last 24 Hrs  T(C): 37.1 (13 Nov 2017 04:50), Max: 37.1 (13 Nov 2017 04:50)  T(F): 98.8 (13 Nov 2017 04:50), Max: 98.8 (13 Nov 2017 04:50)  HR: 94 (13 Nov 2017 04:50) (87 - 94)  BP: 103/72 (13 Nov 2017 04:50) (103/72 - 143/814)  BP(mean): --  RR: 18 (13 Nov 2017 04:50) (17 - 18)  SpO2: 99% (13 Nov 2017 04:50) (96% - 100%)  CAPILLARY BLOOD GLUCOSE      POCT Blood Glucose.: 125 mg/dL (12 Nov 2017 21:23)  POCT Blood Glucose.: 207 mg/dL (12 Nov 2017 18:18)  POCT Blood Glucose.: 169 mg/dL (12 Nov 2017 12:39)  POCT Blood Glucose.: 155 mg/dL (12 Nov 2017 09:10)    I&O's Summary    11 Nov 2017 07:01  -  12 Nov 2017 07:00  --------------------------------------------------------  IN: 180 mL / OUT: 1000 mL / NET: -820 mL    12 Nov 2017 07:01  -  13 Nov 2017 05:56  --------------------------------------------------------  IN: 240 mL / OUT: 0 mL / NET: 240 mL        PHYSICAL EXAM:  GENERAL: NAD, well-developed  HEAD:  Atraumatic, Normocephalic  EYES: conjunctiva and sclera clear  NECK: Supple, No JVD  CHEST/LUNG: Clear to auscultation bilaterally; No wheeze  HEART: Regular rate and rhythm; holosytolic murmur, no rubs or gallops  ABDOMEN: Soft, Nontender, Nondistended; Bowel sounds present  EXTREMITIES:  2+ Peripheral Pulses, No clubbing, cyanosis, or edema  PSYCH: awake and alert, not following commands  SKIN: sacral decubitus stage 3 ulcer present, permacath site with overlying dressing CDI    LABS:                        11.4   7.8   )-----------( 310      ( 12 Nov 2017 06:34 )             35.6     WBC Trend: 7.8<--, 8.5<--, 7.1<--  11-12    139  |  97  |  40<H>  ----------------------------<  172<H>  3.9   |  22  |  4.29<H>    Ca    9.8      12 Nov 2017 06:34      Creatinine Trend: 4.29<--, 3.77<--, 6.02<--, 4.91<--, 3.37<--, 5.05<--  PT/INR - ( 12 Nov 2017 06:34 )   PT: 16.5 sec;   INR: 1.51 ratio         PTT - ( 12 Nov 2017 06:34 )  PTT:87.5 sec            RADIOLOGY & ADDITIONAL TESTS:    Imaging Personally Reviewed:    Consultant(s) Notes Reviewed:      Care Discussed with Consultants/Other Providers:    CONTACT #: [id number] Patient is a 72y old  Male who presents with a chief complaint of s/p cardiac cath (24 Oct 2017 15:51)      SUBJECTIVE / OVERNIGHT EVENTS:  Patient seen and examined at bedside. No acute events overnight. Patient non-verbal with housestaff, therefore unable to obtain ROS.    MEDICATIONS  (STANDING):  amiodarone    Tablet 200 milliGRAM(s) Oral daily  artificial  tears Solution 1 Drop(s) Both EYES four times a day  aspirin  chewable 81 milliGRAM(s) Oral daily  atorvastatin 40 milliGRAM(s) Oral at bedtime  BACItracin   Ointment 1 Application(s) Topical two times a day  busPIRone 5 milliGRAM(s) Oral two times a day  clopidogrel Tablet 75 milliGRAM(s) Oral daily  clotrimazole/betamethasone Cream 1 Application(s) Topical two times a day  dextrose 5%. 1000 milliLiter(s) (50 mL/Hr) IV Continuous <Continuous>  dextrose 50% Injectable 12.5 Gram(s) IV Push once  dextrose 50% Injectable 25 Gram(s) IV Push once  dextrose 50% Injectable 25 Gram(s) IV Push once  fluticasone propionate   110 MICROgram(s) HFA Inhaler 1 Puff(s) Inhalation daily  heparin  Infusion.  Unit(s)/Hr (14 mL/Hr) IV Continuous <Continuous>  insulin lispro (HumaLOG) corrective regimen sliding scale   SubCutaneous Before meals and at bedtime  Nephro-sophie 1 Tablet(s) Oral daily  pantoprazole   Suspension 40 milliGRAM(s) Oral before breakfast  tamsulosin 0.4 milliGRAM(s) Oral at bedtime    MEDICATIONS  (PRN):  ALBUTerol    90 MICROgram(s) HFA Inhaler 2 Puff(s) Inhalation every 6 hours PRN Shortness of Breath and/or Wheezing  dextrose Gel 1 Dose(s) Oral once PRN Blood Glucose LESS THAN 70 milliGRAM(s)/deciliter  glucagon  Injectable 1 milliGRAM(s) IntraMuscular once PRN Glucose LESS THAN 70 milligrams/deciliter  heparin  Injectable 6500 Unit(s) IV Push every 6 hours PRN For aPTT less than 40  heparin  Injectable 3000 Unit(s) IV Push every 6 hours PRN For aPTT between 40 - 57      Vital Signs Last 24 Hrs  T(C): 37.1 (13 Nov 2017 04:50), Max: 37.1 (13 Nov 2017 04:50)  T(F): 98.8 (13 Nov 2017 04:50), Max: 98.8 (13 Nov 2017 04:50)  HR: 94 (13 Nov 2017 04:50) (87 - 94)  BP: 103/72 (13 Nov 2017 04:50) (103/72 - 143/814)  BP(mean): --  RR: 18 (13 Nov 2017 04:50) (17 - 18)  SpO2: 99% (13 Nov 2017 04:50) (96% - 100%)  CAPILLARY BLOOD GLUCOSE      POCT Blood Glucose.: 125 mg/dL (12 Nov 2017 21:23)  POCT Blood Glucose.: 207 mg/dL (12 Nov 2017 18:18)  POCT Blood Glucose.: 169 mg/dL (12 Nov 2017 12:39)  POCT Blood Glucose.: 155 mg/dL (12 Nov 2017 09:10)    I&O's Summary    11 Nov 2017 07:01  -  12 Nov 2017 07:00  --------------------------------------------------------  IN: 180 mL / OUT: 1000 mL / NET: -820 mL    12 Nov 2017 07:01  -  13 Nov 2017 05:56  --------------------------------------------------------  IN: 240 mL / OUT: 0 mL / NET: 240 mL        PHYSICAL EXAM:  GENERAL: NAD, well-developed  HEAD:  Atraumatic, Normocephalic  EYES: conjunctiva and sclera clear  NECK: Supple, No JVD  CHEST/LUNG: Clear to auscultation bilaterally; No wheeze  HEART: Regular rate and rhythm; holosytolic murmur, no rubs or gallops  ABDOMEN: Soft, Nontender, Nondistended; Bowel sounds present  EXTREMITIES:  2+ Peripheral Pulses, No clubbing, cyanosis, or edema  PSYCH: awake and alert, not following commands  SKIN: sacral decubitus stage 3 ulcer present, permacath site with overlying dressing CDI    LABS:                        11.4   7.8   )-----------( 310      ( 12 Nov 2017 06:34 )             35.6     WBC Trend: 7.8<--, 8.5<--, 7.1<--  11-12    139  |  97  |  40<H>  ----------------------------<  172<H>  3.9   |  22  |  4.29<H>    Ca    9.8      12 Nov 2017 06:34      Creatinine Trend: 4.29<--, 3.77<--, 6.02<--, 4.91<--, 3.37<--, 5.05<--  PT/INR - ( 12 Nov 2017 06:34 )   PT: 16.5 sec;   INR: 1.51 ratio         PTT - ( 12 Nov 2017 06:34 )  PTT:87.5 sec    Culture - Blood in AM (11.07.17 @ 08:47)    Specimen Source: .Blood Blood-Venous    Culture Results:   No growth at 5 days.    Culture - Blood (11.07.17 @ 08:47)    Specimen Source: .Blood Blood-Peripheral    Culture Results:   No growth at 5 days.    RADIOLOGY & ADDITIONAL TESTS:    Imaging Personally Reviewed:    Consultant(s) Notes Reviewed:      Care Discussed with Consultants/Other Providers:    CONTACT #: [id number] Patient is a 72y old  Male who presents with a chief complaint of s/p cardiac cath (24 Oct 2017 15:51)      SUBJECTIVE / OVERNIGHT EVENTS:  Patient seen and examined at bedside. No acute events overnight. Oozing from permacath site persists. Patient non-verbal with housestaff, therefore unable to obtain ROS.    MEDICATIONS  (STANDING):  amiodarone    Tablet 200 milliGRAM(s) Oral daily  artificial  tears Solution 1 Drop(s) Both EYES four times a day  aspirin  chewable 81 milliGRAM(s) Oral daily  atorvastatin 40 milliGRAM(s) Oral at bedtime  BACItracin   Ointment 1 Application(s) Topical two times a day  busPIRone 5 milliGRAM(s) Oral two times a day  clopidogrel Tablet 75 milliGRAM(s) Oral daily  clotrimazole/betamethasone Cream 1 Application(s) Topical two times a day  dextrose 5%. 1000 milliLiter(s) (50 mL/Hr) IV Continuous <Continuous>  dextrose 50% Injectable 12.5 Gram(s) IV Push once  dextrose 50% Injectable 25 Gram(s) IV Push once  dextrose 50% Injectable 25 Gram(s) IV Push once  fluticasone propionate   110 MICROgram(s) HFA Inhaler 1 Puff(s) Inhalation daily  heparin  Infusion.  Unit(s)/Hr (14 mL/Hr) IV Continuous <Continuous>  insulin lispro (HumaLOG) corrective regimen sliding scale   SubCutaneous Before meals and at bedtime  Nephro-sophie 1 Tablet(s) Oral daily  pantoprazole   Suspension 40 milliGRAM(s) Oral before breakfast  tamsulosin 0.4 milliGRAM(s) Oral at bedtime    MEDICATIONS  (PRN):  ALBUTerol    90 MICROgram(s) HFA Inhaler 2 Puff(s) Inhalation every 6 hours PRN Shortness of Breath and/or Wheezing  dextrose Gel 1 Dose(s) Oral once PRN Blood Glucose LESS THAN 70 milliGRAM(s)/deciliter  glucagon  Injectable 1 milliGRAM(s) IntraMuscular once PRN Glucose LESS THAN 70 milligrams/deciliter  heparin  Injectable 6500 Unit(s) IV Push every 6 hours PRN For aPTT less than 40  heparin  Injectable 3000 Unit(s) IV Push every 6 hours PRN For aPTT between 40 - 57      Vital Signs Last 24 Hrs  T(C): 37.1 (13 Nov 2017 04:50), Max: 37.1 (13 Nov 2017 04:50)  T(F): 98.8 (13 Nov 2017 04:50), Max: 98.8 (13 Nov 2017 04:50)  HR: 94 (13 Nov 2017 04:50) (87 - 94)  BP: 103/72 (13 Nov 2017 04:50) (103/72 - 143/814)  BP(mean): --  RR: 18 (13 Nov 2017 04:50) (17 - 18)  SpO2: 99% (13 Nov 2017 04:50) (96% - 100%)  CAPILLARY BLOOD GLUCOSE      POCT Blood Glucose.: 125 mg/dL (12 Nov 2017 21:23)  POCT Blood Glucose.: 207 mg/dL (12 Nov 2017 18:18)  POCT Blood Glucose.: 169 mg/dL (12 Nov 2017 12:39)  POCT Blood Glucose.: 155 mg/dL (12 Nov 2017 09:10)    I&O's Summary    11 Nov 2017 07:01  -  12 Nov 2017 07:00  --------------------------------------------------------  IN: 180 mL / OUT: 1000 mL / NET: -820 mL    12 Nov 2017 07:01  -  13 Nov 2017 05:56  --------------------------------------------------------  IN: 240 mL / OUT: 0 mL / NET: 240 mL        PHYSICAL EXAM:  GENERAL: NAD, well-developed  HEAD:  Atraumatic, Normocephalic  EYES: conjunctiva and sclera clear  NECK: Supple, No JVD  CHEST/LUNG: Clear to auscultation bilaterally; No wheeze  HEART: Regular rate and rhythm; no murmurs, rubs, or gallops  ABDOMEN: Soft, Nontender, Nondistended; Bowel sounds present  EXTREMITIES:  2+ Peripheral Pulses, No clubbing, cyanosis, or edema  PSYCH: awake and alert, not following commands  SKIN: sacral decubitus stage 3 ulcer present, clotted blood oozing from permacath site, with dressing and gauze in place, not completely saturated    LABS:                        11.4   7.8   )-----------( 310      ( 12 Nov 2017 06:34 )             35.6     WBC Trend: 7.8<--, 8.5<--, 7.1<--  11-12    139  |  97  |  40<H>  ----------------------------<  172<H>  3.9   |  22  |  4.29<H>    Ca    9.8      12 Nov 2017 06:34      Creatinine Trend: 4.29<--, 3.77<--, 6.02<--, 4.91<--, 3.37<--, 5.05<--  PT/INR - ( 12 Nov 2017 06:34 )   PT: 16.5 sec;   INR: 1.51 ratio         PTT - ( 12 Nov 2017 06:34 )  PTT:87.5 sec    Culture - Blood in AM (11.07.17 @ 08:47)    Specimen Source: .Blood Blood-Venous    Culture Results:   No growth at 5 days.    Culture - Blood (11.07.17 @ 08:47)    Specimen Source: .Blood Blood-Peripheral    Culture Results:   No growth at 5 days.    RADIOLOGY & ADDITIONAL TESTS:    Imaging Personally Reviewed:    Consultant(s) Notes Reviewed:      Care Discussed with Consultants/Other Providers:    CONTACT #: [id number] Patient is a 72y old  Male who presents with a chief complaint of s/p cardiac cath (24 Oct 2017 15:51)      SUBJECTIVE / OVERNIGHT EVENTS:  Patient seen and examined at bedside. No acute events overnight. Oozing from permacath site persists. Patient non-verbal with housestaff, therefore unable to obtain ROS.    MEDICATIONS  (STANDING):  amiodarone    Tablet 200 milliGRAM(s) Oral daily  artificial  tears Solution 1 Drop(s) Both EYES four times a day  aspirin  chewable 81 milliGRAM(s) Oral daily  atorvastatin 40 milliGRAM(s) Oral at bedtime  BACItracin   Ointment 1 Application(s) Topical two times a day  busPIRone 5 milliGRAM(s) Oral two times a day  clopidogrel Tablet 75 milliGRAM(s) Oral daily  clotrimazole/betamethasone Cream 1 Application(s) Topical two times a day  dextrose 5%. 1000 milliLiter(s) (50 mL/Hr) IV Continuous <Continuous>  dextrose 50% Injectable 12.5 Gram(s) IV Push once  dextrose 50% Injectable 25 Gram(s) IV Push once  dextrose 50% Injectable 25 Gram(s) IV Push once  fluticasone propionate   110 MICROgram(s) HFA Inhaler 1 Puff(s) Inhalation daily  heparin  Infusion.  Unit(s)/Hr (14 mL/Hr) IV Continuous <Continuous>  insulin lispro (HumaLOG) corrective regimen sliding scale   SubCutaneous Before meals and at bedtime  Nephro-sophie 1 Tablet(s) Oral daily  pantoprazole   Suspension 40 milliGRAM(s) Oral before breakfast  tamsulosin 0.4 milliGRAM(s) Oral at bedtime    MEDICATIONS  (PRN):  ALBUTerol    90 MICROgram(s) HFA Inhaler 2 Puff(s) Inhalation every 6 hours PRN Shortness of Breath and/or Wheezing  dextrose Gel 1 Dose(s) Oral once PRN Blood Glucose LESS THAN 70 milliGRAM(s)/deciliter  glucagon  Injectable 1 milliGRAM(s) IntraMuscular once PRN Glucose LESS THAN 70 milligrams/deciliter  heparin  Injectable 6500 Unit(s) IV Push every 6 hours PRN For aPTT less than 40  heparin  Injectable 3000 Unit(s) IV Push every 6 hours PRN For aPTT between 40 - 57      Vital Signs Last 24 Hrs  T(C): 37.1 (13 Nov 2017 04:50), Max: 37.1 (13 Nov 2017 04:50)  T(F): 98.8 (13 Nov 2017 04:50), Max: 98.8 (13 Nov 2017 04:50)  HR: 94 (13 Nov 2017 04:50) (87 - 94)  BP: 103/72 (13 Nov 2017 04:50) (103/72 - 143/814)  BP(mean): --  RR: 18 (13 Nov 2017 04:50) (17 - 18)  SpO2: 99% (13 Nov 2017 04:50) (96% - 100%)  CAPILLARY BLOOD GLUCOSE      POCT Blood Glucose.: 125 mg/dL (12 Nov 2017 21:23)  POCT Blood Glucose.: 207 mg/dL (12 Nov 2017 18:18)  POCT Blood Glucose.: 169 mg/dL (12 Nov 2017 12:39)  POCT Blood Glucose.: 155 mg/dL (12 Nov 2017 09:10)    I&O's Summary    11 Nov 2017 07:01  -  12 Nov 2017 07:00  --------------------------------------------------------  IN: 180 mL / OUT: 1000 mL / NET: -820 mL    12 Nov 2017 07:01  -  13 Nov 2017 05:56  --------------------------------------------------------  IN: 240 mL / OUT: 0 mL / NET: 240 mL        PHYSICAL EXAM:  GENERAL: NAD, well-developed  HEAD:  Atraumatic, Normocephalic  EYES: conjunctiva and sclera clear  NECK: Supple, No JVD  CHEST/LUNG: Clear to auscultation bilaterally; No wheeze  HEART: Regular rate and rhythm; no murmurs, rubs, or gallops  ABDOMEN: Soft, Nontender, Nondistended; Bowel sounds present  EXTREMITIES:  2+ Peripheral Pulses, No clubbing, cyanosis, or edema  PSYCH: awake and alert, not following commands  SKIN: sacral decubitus stage 3 ulcer present, clotted blood oozing from permacath site, with dressing and gauze in place, not completely saturated    LABS:                                   10.3   7.8   )-----------( 292      ( 13 Nov 2017 06:55 )             31.7     WBC Trend: 7.8<--, 7.8<--, 8.5<--, 7.1<--  11-13    138  |  95<L>  |  60<H>  ----------------------------<  139<H>  3.9   |  20<L>  |  5.70<H>    Ca    9.5      13 Nov 2017 06:55    Prothrombin Time and INR, Plasma in AM (11.13.17 @ 06:55)    Prothrombin Time, Plasma: 16.9: Effective March 21st, the reference range for PT has changed. sec    INR: 1.54: RECOMMENDED RANGES FOR THERAPEUTIC INR:    2.0-3.0 for most medical and surgical thromboembolic states    2.0-3.0 for atrial fibrillation    2.0-3.0 for bileaflet mechanical valve in aortic position    2.5-3.5 for mechanical heart valves   Chest 2004;[id number]  The presence of direct thrombin inhibitors (argatroban, refludan)  may falsely increase results. ratio     Activated Partial Thromboplastin Time in AM (11.13.17 @ 06:55)    Activated Partial Thromboplastin Time: 88.0: The recommended therapeutic heparin range (full dose) is 58-99 seconds.  Recommended therapeutic Argatroban range is 1.5 to 3.0 times the baseline  APTT value, not to exceed 100 seconds. Recommended therapeutic Refludan  range is 1.5 to 2.5 times thebaseline APTT. sec    Culture - Blood in AM (11.07.17 @ 08:47)    Specimen Source: .Blood Blood-Venous    Culture Results:   No growth at 5 days.    Culture - Blood (11.07.17 @ 08:47)    Specimen Source: .Blood Blood-Peripheral    Culture Results:   No growth at 5 days.    RADIOLOGY & ADDITIONAL TESTS:    Imaging Personally Reviewed:    Consultant(s) Notes Reviewed:      Care Discussed with Consultants/Other Providers:    CONTACT #: [id number] Patient is a 72y old  Male who presents with a chief complaint of s/p cardiac cath (24 Oct 2017 15:51)    SUBJECTIVE / OVERNIGHT EVENTS:  Patient seen and examined at bedside. No acute events overnight. Oozing from permacath site persists. Patient non-verbal with housestaff, therefore unable to obtain ROS.    MEDICATIONS  (STANDING):  amiodarone    Tablet 200 milliGRAM(s) Oral daily  artificial  tears Solution 1 Drop(s) Both EYES four times a day  aspirin  chewable 81 milliGRAM(s) Oral daily  atorvastatin 40 milliGRAM(s) Oral at bedtime  BACItracin   Ointment 1 Application(s) Topical two times a day  busPIRone 5 milliGRAM(s) Oral two times a day  clopidogrel Tablet 75 milliGRAM(s) Oral daily  clotrimazole/betamethasone Cream 1 Application(s) Topical two times a day  dextrose 5%. 1000 milliLiter(s) (50 mL/Hr) IV Continuous <Continuous>  dextrose 50% Injectable 12.5 Gram(s) IV Push once  dextrose 50% Injectable 25 Gram(s) IV Push once  dextrose 50% Injectable 25 Gram(s) IV Push once  fluticasone propionate   110 MICROgram(s) HFA Inhaler 1 Puff(s) Inhalation daily  heparin  Infusion.  Unit(s)/Hr (14 mL/Hr) IV Continuous <Continuous>  insulin lispro (HumaLOG) corrective regimen sliding scale   SubCutaneous Before meals and at bedtime  Nephro-sophie 1 Tablet(s) Oral daily  pantoprazole   Suspension 40 milliGRAM(s) Oral before breakfast  tamsulosin 0.4 milliGRAM(s) Oral at bedtime    MEDICATIONS  (PRN):  ALBUTerol    90 MICROgram(s) HFA Inhaler 2 Puff(s) Inhalation every 6 hours PRN Shortness of Breath and/or Wheezing  dextrose Gel 1 Dose(s) Oral once PRN Blood Glucose LESS THAN 70 milliGRAM(s)/deciliter  glucagon  Injectable 1 milliGRAM(s) IntraMuscular once PRN Glucose LESS THAN 70 milligrams/deciliter  heparin  Injectable 6500 Unit(s) IV Push every 6 hours PRN For aPTT less than 40  heparin  Injectable 3000 Unit(s) IV Push every 6 hours PRN For aPTT between 40 - 57      Vital Signs Last 24 Hrs  T(C): 37.1 (13 Nov 2017 04:50), Max: 37.1 (13 Nov 2017 04:50)  T(F): 98.8 (13 Nov 2017 04:50), Max: 98.8 (13 Nov 2017 04:50)  HR: 94 (13 Nov 2017 04:50) (87 - 94)  BP: 103/72 (13 Nov 2017 04:50) (103/72 - 143/814)  BP(mean): --  RR: 18 (13 Nov 2017 04:50) (17 - 18)  SpO2: 99% (13 Nov 2017 04:50) (96% - 100%)    CAPILLARY BLOOD GLUCOSE  POCT Blood Glucose.: 125 mg/dL (12 Nov 2017 21:23)  POCT Blood Glucose.: 207 mg/dL (12 Nov 2017 18:18)  POCT Blood Glucose.: 169 mg/dL (12 Nov 2017 12:39)  POCT Blood Glucose.: 155 mg/dL (12 Nov 2017 09:10)    I&O's Summary    11 Nov 2017 07:01  -  12 Nov 2017 07:00  --------------------------------------------------------  IN: 180 mL / OUT: 1000 mL / NET: -820 mL    12 Nov 2017 07:01  -  13 Nov 2017 05:56  --------------------------------------------------------  IN: 240 mL / OUT: 0 mL / NET: 240 mL        PHYSICAL EXAM:  GENERAL: NAD, well-developed  HEAD:  Atraumatic, Normocephalic  EYES: conjunctiva and sclera clear  NECK: Supple, No JVD  CHEST/LUNG: Clear to auscultation bilaterally; No wheeze  HEART: Regular rate and rhythm; no murmurs, rubs, or gallops  ABDOMEN: Soft, Nontender, Nondistended; Bowel sounds present  EXTREMITIES:  2+ Peripheral Pulses, No clubbing, cyanosis, or edema  PSYCH: awake and alert, not following commands  SKIN: sacral decubitus stage 3 ulcer present, clotted blood oozing from permacath site, with dressing and gauze in place, not completely saturated    LABS:                                   10.3   7.8   )-----------( 292      ( 13 Nov 2017 06:55 )             31.7     WBC Trend: 7.8<--, 7.8<--, 8.5<--, 7.1<--  11-13    138  |  95<L>  |  60<H>  ----------------------------<  139<H>  3.9   |  20<L>  |  5.70<H>    Ca    9.5      13 Nov 2017 06:55    Prothrombin Time and INR, Plasma in AM (11.13.17 @ 06:55)    Prothrombin Time, Plasma: 16.9: Effective March 21st, the reference range for PT has changed. sec    INR: 1.54: RECOMMENDED RANGES FOR THERAPEUTIC INR:    2.0-3.0 for most medical and surgical thromboembolic states    2.0-3.0 for atrial fibrillation    2.0-3.0 for bileaflet mechanical valve in aortic position    2.5-3.5 for mechanical heart valves   Chest 2004;126:[id number]  The presence of direct thrombin inhibitors (argatroban, refludan)  may falsely increase results. ratio     Activated Partial Thromboplastin Time in AM (11.13.17 @ 06:55)    Activated Partial Thromboplastin Time: 88.0: The recommended therapeutic heparin range (full dose) is 58-99 seconds.  Recommended therapeutic Argatroban range is 1.5 to 3.0 times the baseline  APTT value, not to exceed 100 seconds. Recommended therapeutic Refludan  range is 1.5 to 2.5 times thebaseline APTT. sec    Culture - Blood in AM (11.07.17 @ 08:47)    Specimen Source: .Blood Blood-Venous    Culture Results:   No growth at 5 days.    Culture - Blood (11.07.17 @ 08:47)    Specimen Source: .Blood Blood-Peripheral    Culture Results:   No growth at 5 days.    RADIOLOGY & ADDITIONAL TESTS:  Consultant(s) Notes Reviewed:  Infectious Disease, Vascular Surgery    CONTACT #: [id number]

## 2017-11-13 NOTE — PROVIDER CONTACT NOTE (OTHER) - BACKGROUND
Pt admitted because he became hypotensive during dialysis. Pt came from rehab. Pt has a hx of CAD, DM, CKD, CVA's, HTN and COPD.

## 2017-11-13 NOTE — PROGRESS NOTE ADULT - PROBLEM SELECTOR PLAN 3
- holding coumadin as patient is for AVF placement on 11/16, on heparin gtt for anticoagulation - holding Coumadin as patient is for AVF placement on 11/16, on Heparin gtt for anticoagulation titrated to maintain therapeutic levels

## 2017-11-13 NOTE — PROGRESS NOTE ADULT - PROBLEM SELECTOR PLAN 10
DVT PPX: holding coumadin as patient is for AVF placement on 11/16, on heparin gtt    Dispo: PT recommends d/c to [facility]

## 2017-11-13 NOTE — PROGRESS NOTE ADULT - ASSESSMENT
72/M with T2DM, HTN, HLD, Embolic CVA with right hemiplegia, COPD, bipolar, bilateral nephrolithiasis and bladder stones, ASHD, discharged 10/18 after long hospital stay when initially presented with NSTEMI/pulm edema, UTI, VT s/p shock, embolic CVA, new AF, re-admitted after hypotensive episode in dialysis found with consistent bacteremia with COANS and was started on Vancomycin post HD, s/p permacath exchange 72/M with T2DM, HTN, HLD, Embolic CVA with right hemiplegia, COPD, bipolar, bilateral nephrolithiasis and bladder stones, ASHD, discharged 10/18 after long hospital stay when initially presented with NSTEMI/pulm edema, UTI, VT s/p shock, embolic CVA, new AF, re-admitted after hypotensive episode in dialysis found with consistent bacteremia with COANS and was started on Vancomycin post HD, s/p permacath exchange, with plan for AVF fistula placement for continued HD 3x/week.

## 2017-11-13 NOTE — PROGRESS NOTE ADULT - ASSESSMENT
72M w/ESRD on HD.    - schedule for OR THURSDAY 11/16/17 for creation of RIGHT arm arteriovenous fistula, probable graft  - continnue HD via [facility] permacath -- may need to be changed to [facility] if patient experiences RUE swelling in the future to protect AVF/AVG  - please continue to observe right upper extremity precautions (no IVs, blood draws, BPs)  - please keep NPO after midnight on Wednesday in preparation for OR Thursday  - please send preoperative labs on Wednesday AM (type and screen, coags, BMP, CBC)  - heparin gtt to be held on morning of 11/16 prior to OR; will confirm time once schedule finalized  - please call with questions  - d/w Dr. Se Villanueva, R3 72M w/ESRD on HD.    - schedule for OR THURSDAY 11/16/17 for creation of RIGHT arm arteriovenous fistula, probable graft  - continnue HD via [facility]IJ permacath -- may need to be changed to [facility] if patient experiences RUE swelling in the future to protect AVF/AVG  - please continue to observe right upper extremity precautions (no IVs, blood draws, BPs)  - please keep NPO after midnight on Wednesday in preparation for OR Thursday  - please send preoperative labs on Wednesday AM (type and screen, coags, BMP, CBC)  - heparin gtt to be held on morning of 11/16 prior to OR; will confirm time once schedule finalized  - please call with questions  - d/w Dr. Se Villanueva, R3  [id number]

## 2017-11-13 NOTE — PROGRESS NOTE ADULT - SUBJECTIVE AND OBJECTIVE BOX
Patient is a 72y old  Male who presents with a chief complaint of s/p cardiac cath (24 Oct 2017 15:51)    Being followed by ID for CNS bacteremia    Interval history:comfortable  No acute events      ROS:  Not obtainable    Antimicrobials:Vanco post HD       Other medications reviewed    Vital Signs Last 24 Hrs  T(C): 37.1 (11-13-17 @ 04:50), Max: 37.1 (11-13-17 @ 04:50)  T(F): 98.8 (11-13-17 @ 04:50), Max: 98.8 (11-13-17 @ 04:50)  HR: 94 (11-13-17 @ 04:50) (87 - 94)  BP: 103/72 (11-13-17 @ 04:50) (103/72 - 143/814)  BP(mean): --  RR: 18 (11-13-17 @ 04:50) (17 - 18)  SpO2: 99% (11-13-17 @ 04:50) (96% - 100%)    Physical Exam:    Constitutional well preserved,comfortable,pleasant    HEENT PERRLA EOMI,No pallor or icterus    No oral exudate or erythema    Neck supple no JVD or LN    R SC HD catheter -no erythema no tenderness    Chest Good AE,CTA    CVS RRR S1 S2 WNl No murmur or rub or gallop    Abd soft BS normal No tenderness no masses    Ext No cyanosis clubbing or edema    IV site no erythema tenderness or discharge    Joints no swelling or LOM    CNS no verbal response    Lab Data:                          10.3   7.8   )-----------( 292      ( 13 Nov 2017 06:55 )             31.7       11-13    138  |  95<L>  |  60<H>  ----------------------------<  139<H>  3.9   |  20<L>  |  5.70<H>    Ca    9.5      13 Nov 2017 06:55          Culture - Blood in AM (11.07.17 @ 08:47)    Specimen Source: .Blood Blood-Venous    Culture Results:   No growth at 5 days.

## 2017-11-13 NOTE — PROGRESS NOTE ADULT - PROBLEM SELECTOR PLAN 9
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 Has been stable  - c/w ASA, Plavix, statin  - holding coumadin as patient is for AVF placement on 11/16, on heparin gtt for anticoagulation

## 2017-11-13 NOTE — PROGRESS NOTE ADULT - ASSESSMENT
71 yo with DM,ESRD recent HD,CVA ,non verbal.Recent bacteremia,pyelo s/p antimicrobia   hypotension during HD  CNS bacteremia-?HD cath related  s/p replacement  Repeat Cx negative  Continue IV vanco-14 days from HD cath removal  Other plan per primary  Given negative cultures and clinical stability no ID contraindications to planned AVF ,however risks inherent to any invasive procedure/surgery including infection will remain.Risks/benefits of the same should be discussed with the patient and an informed consent should be obtained by the performing physician.        Will Follow.  Beeper [id number]/afterhours/No response-[id number]

## 2017-11-13 NOTE — PROGRESS NOTE ADULT - PROBLEM SELECTOR PLAN 1
Afebrile, No SOB. Cultures from 11/7 negative x2 after 72 hours. S/p Permacath removal on 11/6 and replacement on 11/9.   - c/w IV Vanco 500mg post HD (last dose 11/11 after HD). Last day of tx is November 23rd.  - CT A/P negative, CXR negative Afebrile, No SOB. Cultures from 11/7 negative x2 after 72 hours. S/p Permacath removal on 11/6 and replacement on 11/9.   - c/w IV Vanco 500mg post HD (last dose 11/11 after HD). Last day of tx is November 20th (14days after catheter removal)  - CT A/P negative, CXR negative

## 2017-11-13 NOTE — PROGRESS NOTE ADULT - PROBLEM SELECTOR PLAN 4
though pt had depressed T's on EKG, trop is dramatically lower than last troponin  will continue to monitor on telemetry  - cont asa/plavix though pt had depressed T's on EKG, trop is dramatically lower than last troponin  - cont asa/plavix though pt had depressed T's on EKG, trop is dramatically lower  - cont ASA/Plavix

## 2017-11-14 LAB
ANION GAP SERPL CALC-SCNC: 19 MMOL/L — HIGH (ref 5–17)
APTT BLD: 119.3 SEC — HIGH (ref 27.5–37.4)
APTT BLD: 125.8 SEC — CRITICAL HIGH (ref 27.5–37.4)
BLD GP AB SCN SERPL QL: NEGATIVE — SIGNIFICANT CHANGE UP
BUN SERPL-MCNC: 32 MG/DL — HIGH (ref 7–23)
CALCIUM SERPL-MCNC: 9.7 MG/DL — SIGNIFICANT CHANGE UP (ref 8.4–10.5)
CHLORIDE SERPL-SCNC: 94 MMOL/L — LOW (ref 96–108)
CO2 SERPL-SCNC: 24 MMOL/L — SIGNIFICANT CHANGE UP (ref 22–31)
CREAT SERPL-MCNC: 3.66 MG/DL — HIGH (ref 0.5–1.3)
GLUCOSE BLDC GLUCOMTR-MCNC: 130 MG/DL — HIGH (ref 70–99)
GLUCOSE BLDC GLUCOMTR-MCNC: 138 MG/DL — HIGH (ref 70–99)
GLUCOSE BLDC GLUCOMTR-MCNC: 161 MG/DL — HIGH (ref 70–99)
GLUCOSE BLDC GLUCOMTR-MCNC: 176 MG/DL — HIGH (ref 70–99)
GLUCOSE SERPL-MCNC: 151 MG/DL — HIGH (ref 70–99)
HBV SURFACE AB SER-ACNC: <3 MIU/ML — LOW
HBV SURFACE AG SER-ACNC: SIGNIFICANT CHANGE UP
HCT VFR BLD CALC: 33.3 % — LOW (ref 39–50)
HCV AB S/CO SERPL IA: 0.3 S/CO — SIGNIFICANT CHANGE UP
HCV AB SERPL-IMP: SIGNIFICANT CHANGE UP
HGB BLD-MCNC: 10.6 G/DL — LOW (ref 13–17)
INR BLD: 1.42 RATIO — HIGH (ref 0.88–1.16)
MCHC RBC-ENTMCNC: 28 PG — SIGNIFICANT CHANGE UP (ref 27–34)
MCHC RBC-ENTMCNC: 31.8 GM/DL — LOW (ref 32–36)
MCV RBC AUTO: 87.9 FL — SIGNIFICANT CHANGE UP (ref 80–100)
PLATELET # BLD AUTO: 324 K/UL — SIGNIFICANT CHANGE UP (ref 150–400)
POTASSIUM SERPL-MCNC: 4.1 MMOL/L — SIGNIFICANT CHANGE UP (ref 3.5–5.3)
POTASSIUM SERPL-SCNC: 4.1 MMOL/L — SIGNIFICANT CHANGE UP (ref 3.5–5.3)
PROTHROM AB SERPL-ACNC: 16.2 SEC — HIGH (ref 10–13.1)
RBC # BLD: 3.79 M/UL — LOW (ref 4.2–5.8)
RBC # FLD: 15.2 % — HIGH (ref 10.3–14.5)
RH IG SCN BLD-IMP: NEGATIVE — SIGNIFICANT CHANGE UP
SODIUM SERPL-SCNC: 137 MMOL/L — SIGNIFICANT CHANGE UP (ref 135–145)
WBC # BLD: 7.89 K/UL — SIGNIFICANT CHANGE UP (ref 3.8–10.5)
WBC # FLD AUTO: 7.89 K/UL — SIGNIFICANT CHANGE UP (ref 3.8–10.5)

## 2017-11-14 PROCEDURE — 99232 SBSQ HOSP IP/OBS MODERATE 35: CPT

## 2017-11-14 PROCEDURE — 71010: CPT | Mod: 26

## 2017-11-14 PROCEDURE — 99233 SBSQ HOSP IP/OBS HIGH 50: CPT | Mod: GC

## 2017-11-14 RX ADMIN — Medication 500 MILLIGRAM(S): at 05:21

## 2017-11-14 RX ADMIN — CLOPIDOGREL BISULFATE 75 MILLIGRAM(S): 75 TABLET, FILM COATED ORAL at 12:05

## 2017-11-14 RX ADMIN — Medication 500 MILLIGRAM(S): at 00:53

## 2017-11-14 RX ADMIN — Medication 1 APPLICATION(S): at 17:07

## 2017-11-14 RX ADMIN — Medication 1 DROP(S): at 17:07

## 2017-11-14 RX ADMIN — CLOTRIMAZOLE AND BETAMETHASONE DIPROPIONATE 1 APPLICATION(S): 10; .5 CREAM TOPICAL at 05:31

## 2017-11-14 RX ADMIN — Medication 1 DROP(S): at 05:22

## 2017-11-14 RX ADMIN — PANTOPRAZOLE SODIUM 40 MILLIGRAM(S): 20 TABLET, DELAYED RELEASE ORAL at 05:22

## 2017-11-14 RX ADMIN — Medication 81 MILLIGRAM(S): at 12:06

## 2017-11-14 RX ADMIN — HEPARIN SODIUM 1200 UNIT(S)/HR: 5000 INJECTION INTRAVENOUS; SUBCUTANEOUS at 12:00

## 2017-11-14 RX ADMIN — Medication 5 MILLIGRAM(S): at 17:07

## 2017-11-14 RX ADMIN — Medication 5 MILLIGRAM(S): at 05:21

## 2017-11-14 RX ADMIN — CLOTRIMAZOLE AND BETAMETHASONE DIPROPIONATE 1 APPLICATION(S): 10; .5 CREAM TOPICAL at 17:08

## 2017-11-14 RX ADMIN — HEPARIN SODIUM 1000 UNIT(S)/HR: 5000 INJECTION INTRAVENOUS; SUBCUTANEOUS at 20:22

## 2017-11-14 RX ADMIN — TAMSULOSIN HYDROCHLORIDE 0.4 MILLIGRAM(S): 0.4 CAPSULE ORAL at 22:07

## 2017-11-14 RX ADMIN — Medication 1 APPLICATION(S): at 05:25

## 2017-11-14 RX ADMIN — Medication 1 DROP(S): at 23:23

## 2017-11-14 RX ADMIN — AMIODARONE HYDROCHLORIDE 200 MILLIGRAM(S): 400 TABLET ORAL at 05:22

## 2017-11-14 RX ADMIN — Medication 1: at 17:26

## 2017-11-14 RX ADMIN — Medication 1 TABLET(S): at 12:05

## 2017-11-14 RX ADMIN — Medication 1 DROP(S): at 12:05

## 2017-11-14 RX ADMIN — Medication 0: at 22:07

## 2017-11-14 RX ADMIN — Medication 1: at 08:53

## 2017-11-14 RX ADMIN — Medication 500 MILLIGRAM(S): at 17:07

## 2017-11-14 RX ADMIN — Medication 1 PUFF(S): at 20:32

## 2017-11-14 RX ADMIN — ATORVASTATIN CALCIUM 40 MILLIGRAM(S): 80 TABLET, FILM COATED ORAL at 22:07

## 2017-11-14 NOTE — PROGRESS NOTE ADULT - PROBLEM SELECTOR PLAN 1
Afebrile, No SOB. Cultures from 11/7 negative x2 after 5 days. S/p Permacath removal on 11/6 and replacement on 11/9.   - c/w IV Vanco 500mg post HD (last dose 11/14 after HD). Last day of tx is November 20th (14 days after catheter removal)  - CT A/P negative, CXR negative

## 2017-11-14 NOTE — PROGRESS NOTE ADULT - SUBJECTIVE AND OBJECTIVE BOX
No distress, non-verbal    Vital Signs Last 24 Hrs  T(C): 36.7 (11-14-17 @ 11:03), Max: 37.1 (11-14-17 @ 05:00)  T(F): 98 (11-14-17 @ 11:03), Max: 98.8 (11-14-17 @ 05:00)  HR: 73 (11-14-17 @ 11:03) (73 - 107)  BP: 104/66 (11-14-17 @ 11:03) (104/66 - 145/74)  BP(mean): --  RR: 17 (11-14-17 @ 11:03) (17 - 19)  SpO2: 95% (11-14-17 @ 11:03) (95% - 99%)    Respiratory: b/l air entry, clear  Cardiovascular: S1 S2 regular  Gastrointestinal: soft, NT, BS present  Extremities: no edema     ALBUTerol    90 MICROgram(s) HFA Inhaler 2 Puff(s) Inhalation every 6 hours PRN  amiodarone    Tablet 200 milliGRAM(s) Oral daily  artificial  tears Solution 1 Drop(s) Both EYES four times a day  ascorbic acid 500 milliGRAM(s) Oral two times a day  aspirin  chewable 81 milliGRAM(s) Oral daily  atorvastatin 40 milliGRAM(s) Oral at bedtime  BACItracin   Ointment 1 Application(s) Topical two times a day  busPIRone 5 milliGRAM(s) Oral two times a day  clopidogrel Tablet 75 milliGRAM(s) Oral daily  clotrimazole/betamethasone Cream 1 Application(s) Topical two times a day  dextrose 5%. 1000 milliLiter(s) IV Continuous <Continuous>  dextrose 50% Injectable 12.5 Gram(s) IV Push once  dextrose 50% Injectable 25 Gram(s) IV Push once  dextrose 50% Injectable 25 Gram(s) IV Push once  dextrose Gel 1 Dose(s) Oral once PRN  epoetin georgie Injectable 06280 Unit(s) IV Push every other day  fluticasone propionate   110 MICROgram(s) HFA Inhaler 1 Puff(s) Inhalation daily  glucagon  Injectable 1 milliGRAM(s) IntraMuscular once PRN  heparin  Infusion.  Unit(s)/Hr IV Continuous <Continuous>  heparin  Injectable 6500 Unit(s) IV Push every 6 hours PRN  heparin  Injectable 3000 Unit(s) IV Push every 6 hours PRN  insulin lispro (HumaLOG) corrective regimen sliding scale   SubCutaneous Before meals and at bedtime  Nephro-sophie 1 Tablet(s) Oral daily  pantoprazole   Suspension 40 milliGRAM(s) Oral before breakfast  tamsulosin 0.4 milliGRAM(s) Oral at bedtime                          10.6   7.89  )-----------( 324      ( 14 Nov 2017 07:37 )             33.3     14 Nov 2017 07:37    137    |  94     |  32     ----------------------------<  151    4.1     |  24     |  3.66     Ca    9.7        14 Nov 2017 07:37      Assessment and Recommendation:     ESRD on HD  Adm w/bacteremia, initially thought to be contam, but repeat bld cx on 10/24 and 11/2 positive for CNS  Perm Cath d/c-d after HD 11/6  F/u blood cx are negative  S/p new perm cath 10/9  Will continue HD MWF  AVF scheduled for 11/16   Abx per ID  D/c planning in progress to [facility] w/HD 3 x week after AVF is placed

## 2017-11-14 NOTE — PROGRESS NOTE ADULT - SUBJECTIVE AND OBJECTIVE BOX
Patient is a 72y old  Male who presents with a chief complaint of s/p cardiac cath (24 Oct 2017 15:51)    Being followed by ID for    Interval history:  No acute events      ROS:  Awake-but non verbal-no ROS obtainable      Antimicrobials:vanco post HD-last 11/13      Other medications reviewed    Vital Signs Last 24 Hrs  T(C): 37.1 (11-14-17 @ 05:00), Max: 37.1 (11-13-17 @ 13:10)  T(F): 98.8 (11-14-17 @ 05:00), Max: 98.8 (11-13-17 @ 13:10)  HR: 104 (11-14-17 @ 05:00) (84 - 107)  BP: 124/79 (11-14-17 @ 05:00) (109/77 - 145/74)  BP(mean): --  RR: 17 (11-14-17 @ 05:00) (17 - 19)  SpO2: 97% (11-14-17 @ 05:00) (97% - 99%)    Physical Exam:    Constitutional well preserved,comfortable,pleasant    HEENT PERRLA EOMI,No pallor or icterus    No oral exudate or erythema    Neck supple no JVD or LN    R SC HD catheter no discharge,dressing in place     Chest Good AE,CTA    CVS RRR S1 S2 WNl No murmur or rub or gallop    Abd soft BS normal No tenderness no masses    Ext No cyanosis clubbing or edema    IV site no erythema tenderness or discharge    Joints no swelling or LOM    CNS as above   Lab Data:                          10.6   7.89  )-----------( 324      ( 14 Nov 2017 07:37 )             33.3       11-13    138  |  95<L>  |  60<H>  ----------------------------<  139<H>  3.9   |  20<L>  |  5.70<H>    Ca    9.5      13 Nov 2017 06:55

## 2017-11-14 NOTE — PROGRESS NOTE ADULT - PROBLEM SELECTOR PLAN 2
ESRD on HD, nephrology on board Dr. Elif Solis - HD MWF via permcath.   - s/p permacath removal 11/6 and replacement on 11/9, received dialysis on 11/13  - next dialysis session to be on Wenesday 11/15.   - Permacath continuing to ooze clotted blood, will continue to monitor with dressing changes, unlikely IR will intervene urgently. If continues to worsen, will touch base with them.   - Vascular sx planning for AVF placement in OR on Thursday 11/16 - coumadin being held and IV heparin gtt in place for anticoagulation, to be held morning of procedure (pt to be kept NPO as of midnight on 11/15, w/ preoperative lab work ordered for AM of 11/14) ESRD on HD, nephrology on board Dr. Elif Solis - HD MWF via permcath.   - s/p permacath removal 11/6 and replacement on 11/9, received dialysis on 11/13  - next dialysis session to be on Wednesday 11/15.   - Permacath continuing to ooze clotted blood, will continue to monitor with dressing changes, unlikely IR will intervene urgently. If continues to worsen, will touch base with them.   - Vascular sx planning for AVF placement in OR on Thursday 11/16 - coumadin being held and IV heparin gtt in place for anticoagulation, to be held morning of procedure (pt to be kept NPO as of midnight on 11/15, w/ preoperative lab work ordered for AM of 11/14)

## 2017-11-14 NOTE — PROGRESS NOTE ADULT - SUBJECTIVE AND OBJECTIVE BOX
Patient is a 72y old  Male who presents with a chief complaint of s/p cardiac cath (24 Oct 2017 15:51)      SUBJECTIVE / OVERNIGHT EVENTS:  Patient seen and examined at bedside. Dialyzed and subsequently dosed vanc yesterday. Clotted blood continuing to ooze from [facility] site. Unable to obtain ROS as patient non-verbal with housestaff.    MEDICATIONS  (STANDING):  amiodarone    Tablet 200 milliGRAM(s) Oral daily  artificial  tears Solution 1 Drop(s) Both EYES four times a day  ascorbic acid 500 milliGRAM(s) Oral two times a day  aspirin  chewable 81 milliGRAM(s) Oral daily  atorvastatin 40 milliGRAM(s) Oral at bedtime  BACItracin   Ointment 1 Application(s) Topical two times a day  busPIRone 5 milliGRAM(s) Oral two times a day  clopidogrel Tablet 75 milliGRAM(s) Oral daily  clotrimazole/betamethasone Cream 1 Application(s) Topical two times a day  dextrose 5%. 1000 milliLiter(s) (50 mL/Hr) IV Continuous <Continuous>  dextrose 50% Injectable 12.5 Gram(s) IV Push once  dextrose 50% Injectable 25 Gram(s) IV Push once  dextrose 50% Injectable 25 Gram(s) IV Push once  epoetin georgie Injectable 49367 Unit(s) IV Push every other day  fluticasone propionate   110 MICROgram(s) HFA Inhaler 1 Puff(s) Inhalation daily  heparin  Infusion.  Unit(s)/Hr (14 mL/Hr) IV Continuous <Continuous>  insulin lispro (HumaLOG) corrective regimen sliding scale   SubCutaneous Before meals and at bedtime  Nephro-sophie 1 Tablet(s) Oral daily  pantoprazole   Suspension 40 milliGRAM(s) Oral before breakfast  tamsulosin 0.4 milliGRAM(s) Oral at bedtime    MEDICATIONS  (PRN):  ALBUTerol    90 MICROgram(s) HFA Inhaler 2 Puff(s) Inhalation every 6 hours PRN Shortness of Breath and/or Wheezing  dextrose Gel 1 Dose(s) Oral once PRN Blood Glucose LESS THAN 70 milliGRAM(s)/deciliter  glucagon  Injectable 1 milliGRAM(s) IntraMuscular once PRN Glucose LESS THAN 70 milligrams/deciliter  heparin  Injectable 6500 Unit(s) IV Push every 6 hours PRN For aPTT less than 40  heparin  Injectable 3000 Unit(s) IV Push every 6 hours PRN For aPTT between 40 - 57      Vital Signs Last 24 Hrs  T(C): 37.1 (14 Nov 2017 05:00), Max: 37.2 (13 Nov 2017 09:00)  T(F): 98.8 (14 Nov 2017 05:00), Max: 98.9 (13 Nov 2017 09:00)  HR: 104 (14 Nov 2017 05:00) (84 - 107)  BP: 124/79 (14 Nov 2017 05:00) (109/77 - 145/74)  BP(mean): --  RR: 17 (14 Nov 2017 05:00) (17 - 19)  SpO2: 97% (14 Nov 2017 05:00) (97% - 99%)  CAPILLARY BLOOD GLUCOSE  134 (13 Nov 2017 08:55)      POCT Blood Glucose.: 242 mg/dL (13 Nov 2017 22:42)  POCT Blood Glucose.: 115 mg/dL (13 Nov 2017 17:09)  POCT Blood Glucose.: 188 mg/dL (13 Nov 2017 13:12)  POCT Blood Glucose.: 134 mg/dL (13 Nov 2017 08:57)    I&O's Summary    12 Nov 2017 07:01  -  13 Nov 2017 07:00  --------------------------------------------------------  IN: 408 mL / OUT: 0 mL / NET: 408 mL    13 Nov 2017 07:01  -  14 Nov 2017 05:56  --------------------------------------------------------  IN: 580 mL / OUT: 0 mL / NET: 580 mL        PHYSICAL EXAM:  GENERAL: NAD, well-developed  HEAD:  Atraumatic, Normocephalic  EYES: conjunctiva and sclera clear  NECK: Supple, No JVD  CHEST/LUNG: Clear to auscultation bilaterally; No wheeze  HEART: Regular rate and rhythm; no murmurs, rubs, or gallops  ABDOMEN: Soft, Nontender, Nondistended; Bowel sounds present  EXTREMITIES:  2+ Peripheral Pulses, No clubbing, cyanosis, or edema  PSYCH: awake and alert, not following commands  SKIN: sacral decubitus stage 3 ulcer present, clotted blood oozing from permacath site, with dressing and gauze in place, not completely saturated    LABS:                        10.3   7.8   )-----------( 292      ( 13 Nov 2017 06:55 )             31.7     WBC Trend: 7.8<--, 7.8<--, 8.5<--  11-13    138  |  95<L>  |  60<H>  ----------------------------<  139<H>  3.9   |  20<L>  |  5.70<H>    Ca    9.5      13 Nov 2017 06:55      Creatinine Trend: 5.70<--, 4.29<--, 3.77<--, 6.02<--, 4.91<--, 3.37<--  PT/INR - ( 13 Nov 2017 06:55 )   PT: 16.9 sec;   INR: 1.54 ratio         PTT - ( 13 Nov 2017 06:55 )  PTT:88.0 sec            RADIOLOGY & ADDITIONAL TESTS:    Imaging Personally Reviewed:    Consultant(s) Notes Reviewed: Infectious Disease, Vascular Surgery, Nephrology     Care Discussed with Consultants/Other Providers:    CONTACT #: [id number] Patient is a 72y old  Male who presents with a chief complaint of s/p cardiac cath (24 Oct 2017 15:51)      SUBJECTIVE / OVERNIGHT EVENTS:  Patient seen and examined at bedside. Dialyzed and subsequently dosed vanc yesterday. Clotted blood no longer oozing from permacath site. Overlying dressing only minimally blood-tinged. Unable to obtain ROS as patient non-verbal with housestaff.    MEDICATIONS  (STANDING):  amiodarone    Tablet 200 milliGRAM(s) Oral daily  artificial  tears Solution 1 Drop(s) Both EYES four times a day  ascorbic acid 500 milliGRAM(s) Oral two times a day  aspirin  chewable 81 milliGRAM(s) Oral daily  atorvastatin 40 milliGRAM(s) Oral at bedtime  BACItracin   Ointment 1 Application(s) Topical two times a day  busPIRone 5 milliGRAM(s) Oral two times a day  clopidogrel Tablet 75 milliGRAM(s) Oral daily  clotrimazole/betamethasone Cream 1 Application(s) Topical two times a day  dextrose 5%. 1000 milliLiter(s) (50 mL/Hr) IV Continuous <Continuous>  dextrose 50% Injectable 12.5 Gram(s) IV Push once  dextrose 50% Injectable 25 Gram(s) IV Push once  dextrose 50% Injectable 25 Gram(s) IV Push once  epoetin georgie Injectable 06074 Unit(s) IV Push every other day  fluticasone propionate   110 MICROgram(s) HFA Inhaler 1 Puff(s) Inhalation daily  heparin  Infusion.  Unit(s)/Hr (14 mL/Hr) IV Continuous <Continuous>  insulin lispro (HumaLOG) corrective regimen sliding scale   SubCutaneous Before meals and at bedtime  Nephro-sophie 1 Tablet(s) Oral daily  pantoprazole   Suspension 40 milliGRAM(s) Oral before breakfast  tamsulosin 0.4 milliGRAM(s) Oral at bedtime    MEDICATIONS  (PRN):  ALBUTerol    90 MICROgram(s) HFA Inhaler 2 Puff(s) Inhalation every 6 hours PRN Shortness of Breath and/or Wheezing  dextrose Gel 1 Dose(s) Oral once PRN Blood Glucose LESS THAN 70 milliGRAM(s)/deciliter  glucagon  Injectable 1 milliGRAM(s) IntraMuscular once PRN Glucose LESS THAN 70 milligrams/deciliter  heparin  Injectable 6500 Unit(s) IV Push every 6 hours PRN For aPTT less than 40  heparin  Injectable 3000 Unit(s) IV Push every 6 hours PRN For aPTT between 40 - 57      Vital Signs Last 24 Hrs  T(C): 37.1 (14 Nov 2017 05:00), Max: 37.2 (13 Nov 2017 09:00)  T(F): 98.8 (14 Nov 2017 05:00), Max: 98.9 (13 Nov 2017 09:00)  HR: 104 (14 Nov 2017 05:00) (84 - 107)  BP: 124/79 (14 Nov 2017 05:00) (109/77 - 145/74)  BP(mean): --  RR: 17 (14 Nov 2017 05:00) (17 - 19)  SpO2: 97% (14 Nov 2017 05:00) (97% - 99%)  CAPILLARY BLOOD GLUCOSE  134 (13 Nov 2017 08:55)    POCT  Blood Glucose (11.14.17 @ 07:46)    POCT Blood Glucose.: 176 mg/dL  POCT Blood Glucose.: 242 mg/dL (13 Nov 2017 22:42)  POCT Blood Glucose.: 115 mg/dL (13 Nov 2017 17:09)  POCT Blood Glucose.: 188 mg/dL (13 Nov 2017 13:12)  POCT Blood Glucose.: 134 mg/dL (13 Nov 2017 08:57)    I&O's Summary    12 Nov 2017 07:01  -  13 Nov 2017 07:00  --------------------------------------------------------  IN: 408 mL / OUT: 0 mL / NET: 408 mL    13 Nov 2017 07:01  -  14 Nov 2017 05:56  --------------------------------------------------------  IN: 580 mL / OUT: 0 mL / NET: 580 mL        PHYSICAL EXAM:  GENERAL: NAD, well-developed  HEAD:  Atraumatic, Normocephalic  EYES: conjunctiva and sclera clear  NECK: Supple, No JVD  CHEST/LUNG: Clear to auscultation bilaterally; No wheeze  HEART: Regular rate and rhythm; no murmurs, rubs, or gallops  ABDOMEN: Soft, Nontender, Nondistended; Bowel sounds present  BACK: Bleeding papular lesion in left upper back  EXTREMITIES:  2+ Peripheral Pulses, No clubbing, cyanosis, or edema  PSYCH: awake and alert, not following commands  SKIN: sacral decubitus stage 3 ulcer present, permacath site w/ overlying dressing only minimally blood-tinged    LABS:                                   10.6   7.89  )-----------( 324      ( 14 Nov 2017 07:37 )             33.3     WBC Trend: 7.89<--, 7.8<--, 7.8<--, 8.5<--  11-13    138  |  95<L>  |  60<H>  ----------------------------<  139<H>  3.9   |  20<L>  |  5.70<H>    Ca    9.5      13 Nov 2017 06:55      Creatinine Trend: 5.70<--, 4.29<--, 3.77<--, 6.02<--, 4.91<--, 3.37<--  PT/INR - ( 13 Nov 2017 06:55 )   PT: 16.9 sec;   INR: 1.54 ratio         PTT - ( 13 Nov 2017 06:55 )  PTT:88.0 sec            RADIOLOGY & ADDITIONAL TESTS:    Imaging Personally Reviewed:    Consultant(s) Notes Reviewed: Infectious Disease, Vascular Surgery, Nephrology     Care Discussed with Consultants/Other Providers:    CONTACT #: [id number] Patient is a 72y old  Male who presents with a chief complaint of s/p cardiac cath (24 Oct 2017 15:51)      SUBJECTIVE / OVERNIGHT EVENTS:  Patient seen and examined at bedside. Dialyzed and subsequently dosed vanc yesterday. Clotted blood no longer oozing from permacath site. Overlying dressing only minimally blood-tinged. Unable to obtain ROS as patient non-verbal with housestaff.    MEDICATIONS  (STANDING):  amiodarone    Tablet 200 milliGRAM(s) Oral daily  artificial  tears Solution 1 Drop(s) Both EYES four times a day  ascorbic acid 500 milliGRAM(s) Oral two times a day  aspirin  chewable 81 milliGRAM(s) Oral daily  atorvastatin 40 milliGRAM(s) Oral at bedtime  BACItracin   Ointment 1 Application(s) Topical two times a day  busPIRone 5 milliGRAM(s) Oral two times a day  clopidogrel Tablet 75 milliGRAM(s) Oral daily  clotrimazole/betamethasone Cream 1 Application(s) Topical two times a day  dextrose 5%. 1000 milliLiter(s) (50 mL/Hr) IV Continuous <Continuous>  dextrose 50% Injectable 12.5 Gram(s) IV Push once  dextrose 50% Injectable 25 Gram(s) IV Push once  dextrose 50% Injectable 25 Gram(s) IV Push once  epoetin georgie Injectable 27373 Unit(s) IV Push every other day  fluticasone propionate   110 MICROgram(s) HFA Inhaler 1 Puff(s) Inhalation daily  heparin  Infusion.  Unit(s)/Hr (14 mL/Hr) IV Continuous <Continuous>  insulin lispro (HumaLOG) corrective regimen sliding scale   SubCutaneous Before meals and at bedtime  Nephro-sophie 1 Tablet(s) Oral daily  pantoprazole   Suspension 40 milliGRAM(s) Oral before breakfast  tamsulosin 0.4 milliGRAM(s) Oral at bedtime    MEDICATIONS  (PRN):  ALBUTerol    90 MICROgram(s) HFA Inhaler 2 Puff(s) Inhalation every 6 hours PRN Shortness of Breath and/or Wheezing  dextrose Gel 1 Dose(s) Oral once PRN Blood Glucose LESS THAN 70 milliGRAM(s)/deciliter  glucagon  Injectable 1 milliGRAM(s) IntraMuscular once PRN Glucose LESS THAN 70 milligrams/deciliter  heparin  Injectable 6500 Unit(s) IV Push every 6 hours PRN For aPTT less than 40  heparin  Injectable 3000 Unit(s) IV Push every 6 hours PRN For aPTT between 40 - 57      Vital Signs Last 24 Hrs  T(C): 37.1 (14 Nov 2017 05:00), Max: 37.2 (13 Nov 2017 09:00)  T(F): 98.8 (14 Nov 2017 05:00), Max: 98.9 (13 Nov 2017 09:00)  HR: 104 (14 Nov 2017 05:00) (84 - 107)  BP: 124/79 (14 Nov 2017 05:00) (109/77 - 145/74)  BP(mean): --  RR: 17 (14 Nov 2017 05:00) (17 - 19)  SpO2: 97% (14 Nov 2017 05:00) (97% - 99%)  CAPILLARY BLOOD GLUCOSE  134 (13 Nov 2017 08:55)    POCT  Blood Glucose (11.14.17 @ 07:46)    POCT Blood Glucose.: 176 mg/dL  POCT Blood Glucose.: 242 mg/dL (13 Nov 2017 22:42)  POCT Blood Glucose.: 115 mg/dL (13 Nov 2017 17:09)  POCT Blood Glucose.: 188 mg/dL (13 Nov 2017 13:12)  POCT Blood Glucose.: 134 mg/dL (13 Nov 2017 08:57)    I&O's Summary    12 Nov 2017 07:01  -  13 Nov 2017 07:00  --------------------------------------------------------  IN: 408 mL / OUT: 0 mL / NET: 408 mL    13 Nov 2017 07:01  -  14 Nov 2017 05:56  --------------------------------------------------------  IN: 580 mL / OUT: 0 mL / NET: 580 mL        PHYSICAL EXAM:  GENERAL: NAD, well-developed  HEAD:  Atraumatic, Normocephalic  EYES: conjunctiva and sclera clear  NECK: Supple, No JVD  CHEST/LUNG: Clear to auscultation bilaterally; No wheeze  HEART: Regular rate and rhythm; no murmurs, rubs, or gallops  ABDOMEN: Soft, Nontender, Nondistended; Bowel sounds present  BACK: Bleeding papular lesion in left upper back  EXTREMITIES:  2+ Peripheral Pulses, No clubbing, cyanosis, or edema  PSYCH: awake and alert, not following commands  SKIN: sacral decubitus stage 3 ulcer present, permacath site w/ overlying dressing only minimally blood-tinged    LABS:                                   10.6   7.89  )-----------( 324      ( 14 Nov 2017 07:37 )             33.3     WBC Trend: 7.89<--, 7.8<--, 7.8<--, 8.5<--  11-13    138  |  95<L>  |  60<H>  ----------------------------<  139<H>  3.9   |  20<L>  |  5.70<H>    Ca    9.5      13 Nov 2017 06:55      Creatinine Trend: 5.70<--, 4.29<--, 3.77<--, 6.02<--, 4.91<--, 3.37<--  Prothrombin Time and INR, Plasma (11.14.17 @ 07:37)    Prothrombin Time, Plasma: 16.2 sec    INR: 1.42: Recommended ranges for therapeutic INR:    2.0-3.0 for most medical and surgical thromboembolic states    2.0-3.0 for atrial fibrillation    2.0-3.0 for bileaflet mechanical valve in aortic position    2.5-3.5 for mechanical heart valves    Chest 2004;126:[id number]  The presence of direct thrombin inhibitors (argatroban, refludan)  may falsely increase results. ratio     Activated Partial Thromboplastin Time (11.14.17 @ 07:37)    Activated Partial Thromboplastin Time: 125.8: Specimen integrity verified.  Test repeated.  The recommended therapeutic heparin range(full dose) is 58-99 seconds.  Recommended therapeutic Argatroban range is 1.5 to 3.0 times the baseline  APTT value, not to exceed 100 seconds. Recommended therapeutic Refludan  range is 1.5 to 2.5 times the baseline APTT. sec    RADIOLOGY & ADDITIONAL TESTS:    Imaging Personally Reviewed:    Consultant(s) Notes Reviewed: Infectious Disease, Vascular Surgery, Nephrology     Care Discussed with Consultants/Other Providers:    CONTACT #: [id number]

## 2017-11-14 NOTE — PROGRESS NOTE ADULT - ASSESSMENT
71 yo with DM,ESRD recent HD,CVA ,non verbal.Recent bacteremia,pyelo s/p antimicrobia   hypotension during HD  CNS bacteremia-?HD cath related  s/p replacement  Repeat Cx negative  Continue IV vanco-14 days from HD cath removal  Other plan per primary  Given negative cultures and clinical stability no ID contraindications to planned AVF on thursday ,however risks inherent to any invasive procedure/surgery including infection will remain.Risks/benefits of the same should be discussed with the patient and an informed consent should be obtained by the performing physician.        Will Follow.  Beeper [id number]/afterhours/No response-[id number]

## 2017-11-14 NOTE — PROGRESS NOTE ADULT - SUBJECTIVE AND OBJECTIVE BOX
SUBJECTIVE: Pt seen, chart reviewed.  Pt w/ some improvement w/ motor function noted  MD stated pt more alert- RN stated pt ate good portion of lunch  Pt unable to offer complaints  no f/c/s, redness, warmth, odor noted by team  (+)incontinent & sedentary      ROS skin see HPI  All other systems      aspirin  chewable 81 milliGRAM(s) Oral daily  clopidogrel Tablet 75 milliGRAM(s) Oral daily  heparin  Infusion.  Unit(s)/Hr IV Continuous <Continuous>  heparin  Injectable 6500 Unit(s) IV Push every 6 hours PRN  heparin  Injectable 3000 Unit(s) IV Push every 6 hours PRN      Physical Exam:  Vital Signs Last 24 Hrs  T(C): 36.7 (14 Nov 2017 11:03), Max: 37.1 (14 Nov 2017 05:00)  T(F): 98 (14 Nov 2017 11:03), Max: 98.8 (14 Nov 2017 05:00)  HR: 73 (14 Nov 2017 11:03) (73 - 107)  BP: 104/66 (14 Nov 2017 11:03) (104/66 - 145/74)  BP(mean): --  RR: 17 (14 Nov 2017 11:03) (17 - 19)  SpO2: 95% (14 Nov 2017 11:03) (95% - 99%)      General Appearance:  NAD, A&Ox3,   Versa Care P500    Skin:  frail w/ decreased turgor, but good cap refill    Sacral Stage III pressure ulcer pale moist & granular   3cm x  1.3cm x 0.2cm    (+) scant serosanguinous drainage  No odor, erythema, increased warmth, tenderness, induration, fluctuance      LABS:                        10.6   7.89  )-----------( 324      ( 14 Nov 2017 07:37 )             33.3     PT/INR - ( 14 Nov 2017 07:37 )   PT: 16.2 sec;   INR: 1.42 ratio         PTT - ( 14 Nov 2017 07:37 )  PTT:125.8 sec

## 2017-11-14 NOTE — PROGRESS NOTE ADULT - ASSESSMENT
72/M with T2DM, HTN, HLD, Embolic CVA with right hemiplegia, COPD, bipolar, bilateral nephrolithiasis and bladder stones, ASHD, discharged 10/18 after long hospital stay when initially presented with NSTEMI/pulm edema, UTI, VT s/p shock, embolic CVA, new AF, re-admitted after hypotensive episode in dialysis found with consistent bacteremia with COANS and was started on Vancomycin post HD, s/p permacath exchange, with plan for AVF fistula placement for continued HD 3x/week.

## 2017-11-14 NOTE — PROGRESS NOTE ADULT - PROBLEM SELECTOR PLAN 3
- holding Coumadin as patient is for AVF placement on 11/16, on Heparin gtt for anticoagulation titrated to maintain therapeutic levels

## 2017-11-14 NOTE — PROGRESS NOTE ADULT - ASSESSMENT
A/P:  Sacral pressure ulcer Probable stage III - Aquacel    con't Nutrition (as tolerated)  con't Offloading   con't Pericare  Care as per medicine will follow w/ you  Upon discharge f/u as outpatient [facility] [phone]  Seen w/ attng and D/w team  Kenisha Chicas PA-C [id number]  I spent 15  minutes w/ this pt of which more than 50% of the time was spent counseling & coordinating care of this pt.

## 2017-11-15 LAB
ANION GAP SERPL CALC-SCNC: 23 MMOL/L — HIGH (ref 5–17)
APTT BLD: 155 SEC — CRITICAL HIGH (ref 27.5–37.4)
APTT BLD: 65 SEC — HIGH (ref 27.5–37.4)
APTT BLD: 66.5 SEC — HIGH (ref 27.5–37.4)
APTT BLD: 87.5 SEC — HIGH (ref 27.5–37.4)
BUN SERPL-MCNC: 50 MG/DL — HIGH (ref 7–23)
CALCIUM SERPL-MCNC: 9.4 MG/DL — SIGNIFICANT CHANGE UP (ref 8.4–10.5)
CHLORIDE SERPL-SCNC: 94 MMOL/L — LOW (ref 96–108)
CO2 SERPL-SCNC: 20 MMOL/L — LOW (ref 22–31)
CREAT SERPL-MCNC: 5.03 MG/DL — HIGH (ref 0.5–1.3)
GLUCOSE BLDC GLUCOMTR-MCNC: 144 MG/DL — HIGH (ref 70–99)
GLUCOSE BLDC GLUCOMTR-MCNC: 144 MG/DL — HIGH (ref 70–99)
GLUCOSE BLDC GLUCOMTR-MCNC: 171 MG/DL — HIGH (ref 70–99)
GLUCOSE BLDC GLUCOMTR-MCNC: 195 MG/DL — HIGH (ref 70–99)
GLUCOSE SERPL-MCNC: 117 MG/DL — HIGH (ref 70–99)
HCT VFR BLD CALC: 30.3 % — LOW (ref 39–50)
HGB BLD-MCNC: 9.4 G/DL — LOW (ref 13–17)
INR BLD: 1.21 RATIO — HIGH (ref 0.88–1.16)
MCHC RBC-ENTMCNC: 27.6 PG — SIGNIFICANT CHANGE UP (ref 27–34)
MCHC RBC-ENTMCNC: 31 GM/DL — LOW (ref 32–36)
MCV RBC AUTO: 88.9 FL — SIGNIFICANT CHANGE UP (ref 80–100)
PLATELET # BLD AUTO: 280 K/UL — SIGNIFICANT CHANGE UP (ref 150–400)
POTASSIUM SERPL-MCNC: 4.3 MMOL/L — SIGNIFICANT CHANGE UP (ref 3.5–5.3)
POTASSIUM SERPL-SCNC: 4.3 MMOL/L — SIGNIFICANT CHANGE UP (ref 3.5–5.3)
PROTHROM AB SERPL-ACNC: 13.7 SEC — HIGH (ref 10–13.1)
RBC # BLD: 3.41 M/UL — LOW (ref 4.2–5.8)
RBC # FLD: 14.9 % — HIGH (ref 10.3–14.5)
SODIUM SERPL-SCNC: 137 MMOL/L — SIGNIFICANT CHANGE UP (ref 135–145)
WBC # BLD: 8.07 K/UL — SIGNIFICANT CHANGE UP (ref 3.8–10.5)
WBC # FLD AUTO: 8.07 K/UL — SIGNIFICANT CHANGE UP (ref 3.8–10.5)

## 2017-11-15 PROCEDURE — 99233 SBSQ HOSP IP/OBS HIGH 50: CPT | Mod: GC

## 2017-11-15 PROCEDURE — 93010 ELECTROCARDIOGRAM REPORT: CPT

## 2017-11-15 PROCEDURE — 99232 SBSQ HOSP IP/OBS MODERATE 35: CPT

## 2017-11-15 PROCEDURE — 71010: CPT | Mod: 26

## 2017-11-15 RX ORDER — VANCOMYCIN HCL 1 G
500 VIAL (EA) INTRAVENOUS ONCE
Qty: 0 | Refills: 0 | Status: COMPLETED | OUTPATIENT
Start: 2017-11-15 | End: 2017-11-15

## 2017-11-15 RX ADMIN — Medication 5 MILLIGRAM(S): at 05:10

## 2017-11-15 RX ADMIN — Medication 5 MILLIGRAM(S): at 17:19

## 2017-11-15 RX ADMIN — Medication 1 DROP(S): at 05:09

## 2017-11-15 RX ADMIN — HEPARIN SODIUM 0 UNIT(S)/HR: 5000 INJECTION INTRAVENOUS; SUBCUTANEOUS at 10:10

## 2017-11-15 RX ADMIN — Medication 1 PUFF(S): at 17:19

## 2017-11-15 RX ADMIN — PANTOPRAZOLE SODIUM 40 MILLIGRAM(S): 20 TABLET, DELAYED RELEASE ORAL at 05:09

## 2017-11-15 RX ADMIN — Medication 1: at 21:37

## 2017-11-15 RX ADMIN — Medication 1 DROP(S): at 23:07

## 2017-11-15 RX ADMIN — ATORVASTATIN CALCIUM 40 MILLIGRAM(S): 80 TABLET, FILM COATED ORAL at 21:04

## 2017-11-15 RX ADMIN — Medication 1 TABLET(S): at 12:41

## 2017-11-15 RX ADMIN — TAMSULOSIN HYDROCHLORIDE 0.4 MILLIGRAM(S): 0.4 CAPSULE ORAL at 21:04

## 2017-11-15 RX ADMIN — Medication 1: at 17:56

## 2017-11-15 RX ADMIN — HEPARIN SODIUM 1000 UNIT(S)/HR: 5000 INJECTION INTRAVENOUS; SUBCUTANEOUS at 02:40

## 2017-11-15 RX ADMIN — CLOTRIMAZOLE AND BETAMETHASONE DIPROPIONATE 1 APPLICATION(S): 10; .5 CREAM TOPICAL at 05:09

## 2017-11-15 RX ADMIN — Medication 1 APPLICATION(S): at 17:18

## 2017-11-15 RX ADMIN — Medication 500 MILLIGRAM(S): at 17:19

## 2017-11-15 RX ADMIN — CLOPIDOGREL BISULFATE 75 MILLIGRAM(S): 75 TABLET, FILM COATED ORAL at 12:41

## 2017-11-15 RX ADMIN — AMIODARONE HYDROCHLORIDE 200 MILLIGRAM(S): 400 TABLET ORAL at 05:11

## 2017-11-15 RX ADMIN — Medication 100 MILLIGRAM(S): at 13:49

## 2017-11-15 RX ADMIN — Medication 81 MILLIGRAM(S): at 12:41

## 2017-11-15 RX ADMIN — Medication 1 DROP(S): at 12:41

## 2017-11-15 RX ADMIN — Medication 1 APPLICATION(S): at 05:11

## 2017-11-15 RX ADMIN — Medication 500 MILLIGRAM(S): at 05:10

## 2017-11-15 RX ADMIN — HEPARIN SODIUM 700 UNIT(S)/HR: 5000 INJECTION INTRAVENOUS; SUBCUTANEOUS at 17:55

## 2017-11-15 RX ADMIN — HEPARIN SODIUM 700 UNIT(S)/HR: 5000 INJECTION INTRAVENOUS; SUBCUTANEOUS at 11:15

## 2017-11-15 RX ADMIN — Medication 1 DROP(S): at 17:17

## 2017-11-15 RX ADMIN — CLOTRIMAZOLE AND BETAMETHASONE DIPROPIONATE 1 APPLICATION(S): 10; .5 CREAM TOPICAL at 17:18

## 2017-11-15 NOTE — PROGRESS NOTE ADULT - SUBJECTIVE AND OBJECTIVE BOX
Patient is a 72y old  Male who presents with a chief complaint of s/p cardiac cath (24 Oct 2017 15:51)    Being followed by ID for bacteremia    Interval history:Undergoing HD  No acute events      ROS:  Non verbal-not obtainable    Antimicrobials:    vancomycin  IVPB 500 milliGRAM(s) IV Intermittent once(vanco by levels)    Other medications reviewed    Vital Signs Last 24 Hrs  T(C): 37.3 (11-15-17 @ 04:12), Max: 37.3 (11-15-17 @ 04:12)  T(F): 99.2 (11-15-17 @ 04:12), Max: 99.2 (11-15-17 @ 04:12)  HR: 88 (11-15-17 @ 04:12) (73 - 93)  BP: 107/71 (11-15-17 @ 04:12) (104/66 - 108/69)  BP(mean): --  RR: 18 (11-15-17 @ 04:12) (17 - 18)  SpO2: 97% (11-15-17 @ 04:12) (95% - 97%)    Physical Exam:    Constitutional well preserved,comfortable,pleasant    HEENT PERRLA EOMI,No pallor or icterus    No oral exudate or erythema    Neck supple no JVD or LN    R SC HD catheter no erythema or tenderness    Chest Good AE,CTA    CVS RRR S1 S2 WNl No murmur or rub or gallop    Abd soft BS normal No tenderness no masses    Ext No cyanosis clubbing or edema    IV site no erythema tenderness or discharge    Joints no swelling or LOM    CNS Non verbal though opens eyes and tracks    Lab Data:                          9.4    8.07  )-----------( 280      ( 15 Nov 2017 07:03 )             30.3       11-15    137  |  94<L>  |  50<H>  ----------------------------<  117<H>  4.3   |  20<L>  |  5.03<H>    Ca    9.4      15 Nov 2017 07:34

## 2017-11-15 NOTE — PROGRESS NOTE ADULT - PROBLEM SELECTOR PLAN 2
ESRD on HD, nephrology on board Dr. Elif Solis - HD MWF via permcath.   - s/p permacath removal 11/6 and replacement on 11/9, received dialysis on 11/13  - next dialysis session to be today 11/15.   - Permacath no longer oozing clotted blood, will continue to monitor with dressing changes, unlikely IR will intervene urgently. If worsens, will touch base with them.   - Vascular sx planning for AVF placement in OR tomorrow 11/16 - coumadin being held and IV heparin gtt in place for anticoagulation, to be held morning of procedure (pt to be kept NPO as of midnight on 11/15, preoperative lab work ordered for AM of 11/14)

## 2017-11-15 NOTE — PROGRESS NOTE ADULT - SUBJECTIVE AND OBJECTIVE BOX
Patient for HD today. No new events.  Tolerating HD treatment well.  HD prescription reviewed.        PHYSICAL EXAM:      T(C): 37.3 (11-15-17 @ 04:12), Max: 37.3 (11-15-17 @ 04:12)  HR: 88 (11-15-17 @ 04:12) (73 - 93)  BP: 107/71 (11-15-17 @ 04:12) (104/66 - 108/69)  RR: 18 (11-15-17 @ 04:12) (17 - 18)  SpO2: 97% (11-15-17 @ 04:12) (95% - 97%)  Wt(kg): --  Respiratory: clear anteriorly, decreased BS at bases  Cardiovascular: S1 S2  Gastrointestinal: soft NT ND +BS  Extremities:   1 edema                                          9.4    8.07  )-----------( 280      ( 15 Nov 2017 07:03 )             30.3     11-15    137  |  94<L>  |  50<H>  ----------------------------<  117<H>  4.3   |  20<L>  |  5.03<H>    Ca    9.4      15 Nov 2017 07:34        Maintenance hemodialysis.  Blood pressure trends, BFR, AP, , UF goal reviewed.  Clinically stable.

## 2017-11-15 NOTE — PROGRESS NOTE ADULT - SUBJECTIVE AND OBJECTIVE BOX
PRE OPERATIVE NOTE    Pre-op Diagnosis: ESRD  Procedure: AVF  Surgeon: Se                          9.4    8.07  )-----------( 280      ( 15 Nov 2017 07:03 )             30.3     11-15    137  |  94<L>  |  50<H>  ----------------------------<  117<H>  4.3   |  20<L>  |  5.03<H>    Ca    9.4      15 Nov 2017 07:34        PT/INR - ( 15 Nov 2017 07:00 )   PT: 13.7 sec;   INR: 1.21 ratio         PTT - ( 15 Nov 2017 09:32 )  PTT:155.0 sec    CAPILLARY BLOOD GLUCOSE      POCT Blood Glucose.: 144 mg/dL (15 Nov 2017 06:53)      Type & Screen: blood available until 11/16  CXR: pending 11/15  EKG: pending 11/15

## 2017-11-15 NOTE — PROGRESS NOTE ADULT - SUBJECTIVE AND OBJECTIVE BOX
Patient is a 72y old  Male who presents with a chief complaint of s/p cardiac cath (24 Oct 2017 15:51)      SUBJECTIVE / OVERNIGHT EVENTS:  Patient seen and examined at bedside. No acute events overnight. Dressing overlying permacath site minimally blood-tinged. Unable to obtain ROS as patient non-verbal with housestaff. For HD today and AVF placement by vascular surgery tomorrow.    MEDICATIONS  (STANDING):  amiodarone    Tablet 200 milliGRAM(s) Oral daily  artificial  tears Solution 1 Drop(s) Both EYES four times a day  ascorbic acid 500 milliGRAM(s) Oral two times a day  aspirin  chewable 81 milliGRAM(s) Oral daily  atorvastatin 40 milliGRAM(s) Oral at bedtime  BACItracin   Ointment 1 Application(s) Topical two times a day  busPIRone 5 milliGRAM(s) Oral two times a day  clopidogrel Tablet 75 milliGRAM(s) Oral daily  clotrimazole/betamethasone Cream 1 Application(s) Topical two times a day  dextrose 5%. 1000 milliLiter(s) (50 mL/Hr) IV Continuous <Continuous>  dextrose 50% Injectable 12.5 Gram(s) IV Push once  dextrose 50% Injectable 25 Gram(s) IV Push once  dextrose 50% Injectable 25 Gram(s) IV Push once  epoetin georgie Injectable 31532 Unit(s) IV Push every other day  fluticasone propionate   110 MICROgram(s) HFA Inhaler 1 Puff(s) Inhalation daily  heparin  Infusion.  Unit(s)/Hr (14 mL/Hr) IV Continuous <Continuous>  insulin lispro (HumaLOG) corrective regimen sliding scale   SubCutaneous Before meals and at bedtime  Nephro-sophie 1 Tablet(s) Oral daily  pantoprazole   Suspension 40 milliGRAM(s) Oral before breakfast  tamsulosin 0.4 milliGRAM(s) Oral at bedtime    MEDICATIONS  (PRN):  ALBUTerol    90 MICROgram(s) HFA Inhaler 2 Puff(s) Inhalation every 6 hours PRN Shortness of Breath and/or Wheezing  dextrose Gel 1 Dose(s) Oral once PRN Blood Glucose LESS THAN 70 milliGRAM(s)/deciliter  glucagon  Injectable 1 milliGRAM(s) IntraMuscular once PRN Glucose LESS THAN 70 milligrams/deciliter  heparin  Injectable 6500 Unit(s) IV Push every 6 hours PRN For aPTT less than 40  heparin  Injectable 3000 Unit(s) IV Push every 6 hours PRN For aPTT between 40 - 57      Vital Signs Last 24 Hrs  T(C): 37.3 (15 Nov 2017 04:12), Max: 37.3 (15 Nov 2017 04:12)  T(F): 99.2 (15 Nov 2017 04:12), Max: 99.2 (15 Nov 2017 04:12)  HR: 88 (15 Nov 2017 04:12) (73 - 93)  BP: 107/71 (15 Nov 2017 04:12) (104/66 - 108/69)  BP(mean): --  RR: 18 (15 Nov 2017 04:12) (17 - 18)  SpO2: 97% (15 Nov 2017 04:12) (95% - 97%)  CAPILLARY BLOOD GLUCOSE      POCT Blood Glucose.: 138 mg/dL (14 Nov 2017 21:30)  POCT Blood Glucose.: 161 mg/dL (14 Nov 2017 17:12)  POCT Blood Glucose.: 130 mg/dL (14 Nov 2017 12:09)  POCT Blood Glucose.: 176 mg/dL (14 Nov 2017 07:46)    I&O's Summary    13 Nov 2017 07:01  -  14 Nov 2017 07:00  --------------------------------------------------------  IN: 778 mL / OUT: 0 mL / NET: 778 mL    14 Nov 2017 07:01  -  15 Nov 2017 06:15  --------------------------------------------------------  IN: 580 mL / OUT: 0 mL / NET: 580 mL        PHYSICAL EXAM:  GENERAL: NAD, well-developed  HEAD:  Atraumatic, Normocephalic  EYES: conjunctiva and sclera clear  NECK: Supple, No JVD  CHEST/LUNG: Clear to auscultation bilaterally; No wheeze  HEART: Regular rate and rhythm; no murmurs, rubs, or gallops  ABDOMEN: Soft, Nontender, Nondistended; Bowel sounds present  BACK: Bleeding papular lesion in left upper back  EXTREMITIES:  2+ Peripheral Pulses, No clubbing, cyanosis, or edema  PSYCH: awake and alert, not following commands  SKIN: sacral decubitus stage 3 ulcer present, dressing overlying permacath site minimally blood-tinged    LABS:                        10.6   7.89  )-----------( 324      ( 14 Nov 2017 07:37 )             33.3     WBC Trend: 7.89<--, 7.8<--, 7.8<--  11-14    137  |  94<L>  |  32<H>  ----------------------------<  151<H>  4.1   |  24  |  3.66<H>    Ca    9.7      14 Nov 2017 07:37      Creatinine Trend: 3.66<--, 5.70<--, 4.29<--, 3.77<--, 6.02<--, 4.91<--  PT/INR - ( 14 Nov 2017 07:37 )   PT: 16.2 sec;   INR: 1.42 ratio         PTT - ( 15 Nov 2017 02:10 )  PTT:87.5 sec    Hepatitis C Antibody Test (11.13.17 @ 21:57)    Hepatitis C Virus S/CO Ratio: 0.30 S/CO    Hepatitis C Virus Interpretation: Nonreact: Hepatitis C AB  S/CO Ratio                        Interpretation  < 1.0                                     Non-Reactive  1.0 - 4.9                           Weakly-Reactive  > 5.0                                 Reactive  Non-Reactive: A person witha non-reactive HCV antibody result is  considered uninfected.  No further action is needed unless recent  infection is suspected.  In these cases, consider repeat testing later to  detect seroconversion..  Weakly-Reactive: HCV antibody test is abnormal, HCV RNA Qualitative test  will follow.  Reactive: HCV antibody test is abnormal, HCV RNA Qualitative test will  follow.  Note: HCV antibody testing is performed on the Abbott  system.    Hepatitis B Surface AB Quantitative (11.13.17 @ 21:57)    Hepatitis B Surface AB Quantitative: <3.0:   HEPBSAB Quantitative Interpretation:  > 12.0         mIU/mL     Immune  8.0-12.0      mIU/mL     Borderline  < 8.0           mIU/mL     Non-Immune mIU/mL    Hepatitis B Surface Antigen (11.13.17 @ 21:57)    Hepatitis B Surface Antigen: Nonreact    RADIOLOGY & ADDITIONAL TESTS:    Imaging Personally Reviewed:    Consultant(s) Notes Reviewed: Infectious Disease, Nephrology    Care Discussed with Consultants/Other Providers:    CONTACT #: [id number] Patient is a 72y old  Male who presents with a chief complaint of s/p cardiac cath (24 Oct 2017 15:51)      SUBJECTIVE / OVERNIGHT EVENTS:  Patient seen and examined at bedside. No acute events overnight. Dressing overlying permacath CDI. Unable to obtain ROS as patient non-verbal with housestaff. For HD today and AVF placement by vascular surgery tomorrow.    MEDICATIONS  (STANDING):  amiodarone    Tablet 200 milliGRAM(s) Oral daily  artificial  tears Solution 1 Drop(s) Both EYES four times a day  ascorbic acid 500 milliGRAM(s) Oral two times a day  aspirin  chewable 81 milliGRAM(s) Oral daily  atorvastatin 40 milliGRAM(s) Oral at bedtime  BACItracin   Ointment 1 Application(s) Topical two times a day  busPIRone 5 milliGRAM(s) Oral two times a day  clopidogrel Tablet 75 milliGRAM(s) Oral daily  clotrimazole/betamethasone Cream 1 Application(s) Topical two times a day  dextrose 5%. 1000 milliLiter(s) (50 mL/Hr) IV Continuous <Continuous>  dextrose 50% Injectable 12.5 Gram(s) IV Push once  dextrose 50% Injectable 25 Gram(s) IV Push once  dextrose 50% Injectable 25 Gram(s) IV Push once  epoetin georgie Injectable 76210 Unit(s) IV Push every other day  fluticasone propionate   110 MICROgram(s) HFA Inhaler 1 Puff(s) Inhalation daily  heparin  Infusion.  Unit(s)/Hr (14 mL/Hr) IV Continuous <Continuous>  insulin lispro (HumaLOG) corrective regimen sliding scale   SubCutaneous Before meals and at bedtime  Nephro-sophie 1 Tablet(s) Oral daily  pantoprazole   Suspension 40 milliGRAM(s) Oral before breakfast  tamsulosin 0.4 milliGRAM(s) Oral at bedtime    MEDICATIONS  (PRN):  ALBUTerol    90 MICROgram(s) HFA Inhaler 2 Puff(s) Inhalation every 6 hours PRN Shortness of Breath and/or Wheezing  dextrose Gel 1 Dose(s) Oral once PRN Blood Glucose LESS THAN 70 milliGRAM(s)/deciliter  glucagon  Injectable 1 milliGRAM(s) IntraMuscular once PRN Glucose LESS THAN 70 milligrams/deciliter  heparin  Injectable 6500 Unit(s) IV Push every 6 hours PRN For aPTT less than 40  heparin  Injectable 3000 Unit(s) IV Push every 6 hours PRN For aPTT between 40 - 57      Vital Signs Last 24 Hrs  T(C): 37.3 (15 Nov 2017 04:12), Max: 37.3 (15 Nov 2017 04:12)  T(F): 99.2 (15 Nov 2017 04:12), Max: 99.2 (15 Nov 2017 04:12)  HR: 88 (15 Nov 2017 04:12) (73 - 93)  BP: 107/71 (15 Nov 2017 04:12) (104/66 - 108/69)  BP(mean): --  RR: 18 (15 Nov 2017 04:12) (17 - 18)  SpO2: 97% (15 Nov 2017 04:12) (95% - 97%)  CAPILLARY BLOOD GLUCOSE    POCT  Blood Glucose (11.15.17 @ 06:53)    POCT Blood Glucose.: 144 mg/dL  POCT Blood Glucose.: 138 mg/dL (14 Nov 2017 21:30)  POCT Blood Glucose.: 161 mg/dL (14 Nov 2017 17:12)  POCT Blood Glucose.: 130 mg/dL (14 Nov 2017 12:09)  POCT Blood Glucose.: 176 mg/dL (14 Nov 2017 07:46)    I&O's Summary    13 Nov 2017 07:01  -  14 Nov 2017 07:00  --------------------------------------------------------  IN: 778 mL / OUT: 0 mL / NET: 778 mL    14 Nov 2017 07:01  -  15 Nov 2017 06:15  --------------------------------------------------------  IN: 580 mL / OUT: 0 mL / NET: 580 mL        PHYSICAL EXAM:  GENERAL: NAD, well-developed  HEAD:  Atraumatic, Normocephalic  EYES: conjunctiva and sclera clear  NECK: Supple, No JVD  CHEST/LUNG: Clear to auscultation bilaterally; No wheeze  HEART: Regular rate and rhythm; no murmurs, rubs, or gallops  ABDOMEN: Soft, Nontender, Nondistended; Bowel sounds present  BACK: Bleeding papular lesion in left upper back  EXTREMITIES:  2+ Peripheral Pulses, No clubbing, cyanosis, or edema  PSYCH: awake and alert, not following commands  SKIN: sacral decubitus stage 3 ulcer present, dressing overlying permacath site CDI    LABS:                                   9.4    8.07  )-----------( 280      ( 15 Nov 2017 07:03 )             30.3     WBC Trend: 8.07<--, 7.89<--, 7.8<--, 7.8<--  11-14    137  |  94<L>  |  32<H>  ----------------------------<  151<H>  4.1   |  24  |  3.66<H>    Ca    9.7      14 Nov 2017 07:37      Creatinine Trend: 3.66<--, 5.70<--, 4.29<--, 3.77<--, 6.02<--, 4.91<--  Prothrombin Time and INR, Plasma in AM (11.15.17 @ 07:00)    Prothrombin Time, Plasma: 13.7 sec    INR: 1.21: Recommended ranges for therapeutic INR:    2.0-3.0 for most medical and surgical thromboembolic states    2.0-3.0 for atrial fibrillation    2.0-3.0 for bileaflet mechanical valve in aortic position    2.5-3.5 for mechanical heart valves    Chest 2004;126:[id number]  The presence of direct thrombin inhibitors (argatroban, refludan)  may falsely increase results. ratio     Activated Partial Thromboplastin Time in AM (11.15.17 @ 07:00)    Activated Partial Thromboplastin Time: 66.5: The recommended therapeutic heparin range(full dose) is 58-99 seconds.  Recommended therapeutic Argatroban range is 1.5 to 3.0 times the baseline  APTT value, not to exceed 100 seconds. Recommended therapeutic Refludan  range is 1.5 to 2.5 times the baseline APTT. sec    Hepatitis C Antibody Test (11.13.17 @ 21:57)    Hepatitis C Virus S/CO Ratio: 0.30 S/CO    Hepatitis C Virus Interpretation: Nonreact: Hepatitis C AB  S/CO Ratio                        Interpretation  < 1.0                                     Non-Reactive  1.0 - 4.9                           Weakly-Reactive  > 5.0                                 Reactive  Non-Reactive: A person witha non-reactive HCV antibody result is  considered uninfected.  No further action is needed unless recent  infection is suspected.  In these cases, consider repeat testing later to  detect seroconversion..  Weakly-Reactive: HCV antibody test is abnormal, HCV RNA Qualitative test  will follow.  Reactive: HCV antibody test is abnormal, HCV RNA Qualitative test will  follow.  Note: HCV antibody testing is performed on the Abbott  system.    Hepatitis B Surface AB Quantitative (11.13.17 @ 21:57)    Hepatitis B Surface AB Quantitative: <3.0:   HEPBSAB Quantitative Interpretation:  > 12.0         mIU/mL     Immune  8.0-12.0      mIU/mL     Borderline  < 8.0           mIU/mL     Non-Immune mIU/mL    Hepatitis B Surface Antigen (11.13.17 @ 21:57)    Hepatitis B Surface Antigen: Nonreact    RADIOLOGY & ADDITIONAL TESTS:    Imaging Personally Reviewed:    Consultant(s) Notes Reviewed: Infectious Disease, Nephrology    Care Discussed with Consultants/Other Providers:    CONTACT #: [id number] Patient is a 72y old  Male who presents with a chief complaint of s/p cardiac cath (24 Oct 2017 15:51)      SUBJECTIVE / OVERNIGHT EVENTS:  Patient seen and examined at bedside. No acute events overnight. Dressing overlying permacath CDI. Unable to obtain ROS as patient non-verbal with housestaff. For HD today and AVF placement by vascular surgery tomorrow.    MEDICATIONS  (STANDING):  amiodarone    Tablet 200 milliGRAM(s) Oral daily  artificial  tears Solution 1 Drop(s) Both EYES four times a day  ascorbic acid 500 milliGRAM(s) Oral two times a day  aspirin  chewable 81 milliGRAM(s) Oral daily  atorvastatin 40 milliGRAM(s) Oral at bedtime  BACItracin   Ointment 1 Application(s) Topical two times a day  busPIRone 5 milliGRAM(s) Oral two times a day  clopidogrel Tablet 75 milliGRAM(s) Oral daily  clotrimazole/betamethasone Cream 1 Application(s) Topical two times a day  dextrose 5%. 1000 milliLiter(s) (50 mL/Hr) IV Continuous <Continuous>  dextrose 50% Injectable 12.5 Gram(s) IV Push once  dextrose 50% Injectable 25 Gram(s) IV Push once  dextrose 50% Injectable 25 Gram(s) IV Push once  epoetin georgie Injectable 98734 Unit(s) IV Push every other day  fluticasone propionate   110 MICROgram(s) HFA Inhaler 1 Puff(s) Inhalation daily  heparin  Infusion.  Unit(s)/Hr (14 mL/Hr) IV Continuous <Continuous>  insulin lispro (HumaLOG) corrective regimen sliding scale   SubCutaneous Before meals and at bedtime  Nephro-sophie 1 Tablet(s) Oral daily  pantoprazole   Suspension 40 milliGRAM(s) Oral before breakfast  tamsulosin 0.4 milliGRAM(s) Oral at bedtime    MEDICATIONS  (PRN):  ALBUTerol    90 MICROgram(s) HFA Inhaler 2 Puff(s) Inhalation every 6 hours PRN Shortness of Breath and/or Wheezing  dextrose Gel 1 Dose(s) Oral once PRN Blood Glucose LESS THAN 70 milliGRAM(s)/deciliter  glucagon  Injectable 1 milliGRAM(s) IntraMuscular once PRN Glucose LESS THAN 70 milligrams/deciliter  heparin  Injectable 6500 Unit(s) IV Push every 6 hours PRN For aPTT less than 40  heparin  Injectable 3000 Unit(s) IV Push every 6 hours PRN For aPTT between 40 - 57      Vital Signs Last 24 Hrs  T(C): 37.3 (15 Nov 2017 04:12), Max: 37.3 (15 Nov 2017 04:12)  T(F): 99.2 (15 Nov 2017 04:12), Max: 99.2 (15 Nov 2017 04:12)  HR: 88 (15 Nov 2017 04:12) (73 - 93)  BP: 107/71 (15 Nov 2017 04:12) (104/66 - 108/69)  BP(mean): --  RR: 18 (15 Nov 2017 04:12) (17 - 18)  SpO2: 97% (15 Nov 2017 04:12) (95% - 97%)  CAPILLARY BLOOD GLUCOSE    POCT  Blood Glucose (11.15.17 @ 06:53)    POCT Blood Glucose.: 144 mg/dL  POCT Blood Glucose.: 138 mg/dL (14 Nov 2017 21:30)  POCT Blood Glucose.: 161 mg/dL (14 Nov 2017 17:12)  POCT Blood Glucose.: 130 mg/dL (14 Nov 2017 12:09)  POCT Blood Glucose.: 176 mg/dL (14 Nov 2017 07:46)    I&O's Summary    13 Nov 2017 07:01  -  14 Nov 2017 07:00  --------------------------------------------------------  IN: 778 mL / OUT: 0 mL / NET: 778 mL    14 Nov 2017 07:01  -  15 Nov 2017 06:15  --------------------------------------------------------  IN: 580 mL / OUT: 0 mL / NET: 580 mL        PHYSICAL EXAM:  GENERAL: NAD, well-developed  HEAD:  Atraumatic, Normocephalic  EYES: conjunctiva and sclera clear  NECK: Supple, No JVD  CHEST/LUNG: Clear to auscultation bilaterally; No wheeze  HEART: Regular rate and rhythm; no murmurs, rubs, or gallops  ABDOMEN: Soft, Nontender, Nondistended; Bowel sounds present  BACK: Bleeding papular lesion in left upper back  EXTREMITIES:  2+ Peripheral Pulses, No clubbing, cyanosis, or edema  PSYCH: awake and alert, not following commands  SKIN: sacral decubitus stage 3 ulcer present, dressing overlying permacath site CDI    LABS:                                   9.4    8.07  )-----------( 280      ( 15 Nov 2017 07:03 )             30.3     WBC Trend: 8.07<--, 7.89<--, 7.8<--, 7.8<--  11-15    137  |  94<L>  |  50<H>  ----------------------------<  117<H>  4.3   |  20<L>  |  5.03<H>    Ca    9.4      15 Nov 2017 07:34    Creatinine Trend: 5.03<--, 3.66<--, 5.70<--, 4.29<--, 3.77<--, 6.02<--, 4.91<--  Prothrombin Time and INR, Plasma in AM (11.15.17 @ 07:00)    Prothrombin Time, Plasma: 13.7 sec    INR: 1.21: Recommended ranges for therapeutic INR:    2.0-3.0 for most medical and surgical thromboembolic states    2.0-3.0 for atrial fibrillation    2.0-3.0 for bileaflet mechanical valve in aortic position    2.5-3.5 for mechanical heart valves    Chest 2004;126:[id number]  The presence of direct thrombin inhibitors (argatroban, refludan)  may falsely increase results. ratio     Activated Partial Thromboplastin Time in AM (11.15.17 @ 07:00)    Activated Partial Thromboplastin Time: 66.5: The recommended therapeutic heparin range(full dose) is 58-99 seconds.  Recommended therapeutic Argatroban range is 1.5 to 3.0 times the baseline  APTT value, not to exceed 100 seconds. Recommended therapeutic Refludan  range is 1.5 to 2.5 times the baseline APTT. sec    Hepatitis C Antibody Test (11.13.17 @ 21:57)    Hepatitis C Virus S/CO Ratio: 0.30 S/CO    Hepatitis C Virus Interpretation: Nonreact: Hepatitis C AB  S/CO Ratio                        Interpretation  < 1.0                                     Non-Reactive  1.0 - 4.9                           Weakly-Reactive  > 5.0                                 Reactive  Non-Reactive: A person witha non-reactive HCV antibody result is  considered uninfected.  No further action is needed unless recent  infection is suspected.  In these cases, consider repeat testing later to  detect seroconversion..  Weakly-Reactive: HCV antibody test is abnormal, HCV RNA Qualitative test  will follow.  Reactive: HCV antibody test is abnormal, HCV RNA Qualitative test will  follow.  Note: HCV antibody testing is performed on the Abbott  system.    Hepatitis B Surface AB Quantitative (11.13.17 @ 21:57)    Hepatitis B Surface AB Quantitative: <3.0:   HEPBSAB Quantitative Interpretation:  > 12.0         mIU/mL     Immune  8.0-12.0      mIU/mL     Borderline  < 8.0           mIU/mL     Non-Immune mIU/mL    Hepatitis B Surface Antigen (11.13.17 @ 21:57)    Hepatitis B Surface Antigen: Nonreact    RADIOLOGY & ADDITIONAL TESTS:    Imaging Personally Reviewed:    Consultant(s) Notes Reviewed: Infectious Disease, Nephrology    Care Discussed with Consultants/Other Providers:    CONTACT #: [id number] Patient is a 72y old  Male who presents with a chief complaint of s/p cardiac cath (24 Oct 2017 15:51)      SUBJECTIVE / OVERNIGHT EVENTS:  Patient seen and examined at bedside. No acute events overnight. Dressing overlying permacath CDI. Unable to obtain ROS as patient non-verbal with housestaff. For HD today and AVF placement by vascular surgery tomorrow.    MEDICATIONS  (STANDING):  amiodarone    Tablet 200 milliGRAM(s) Oral daily  artificial  tears Solution 1 Drop(s) Both EYES four times a day  ascorbic acid 500 milliGRAM(s) Oral two times a day  aspirin  chewable 81 milliGRAM(s) Oral daily  atorvastatin 40 milliGRAM(s) Oral at bedtime  BACItracin   Ointment 1 Application(s) Topical two times a day  busPIRone 5 milliGRAM(s) Oral two times a day  clopidogrel Tablet 75 milliGRAM(s) Oral daily  clotrimazole/betamethasone Cream 1 Application(s) Topical two times a day  dextrose 5%. 1000 milliLiter(s) (50 mL/Hr) IV Continuous <Continuous>  dextrose 50% Injectable 12.5 Gram(s) IV Push once  dextrose 50% Injectable 25 Gram(s) IV Push once  dextrose 50% Injectable 25 Gram(s) IV Push once  epoetin georgie Injectable 51580 Unit(s) IV Push every other day  fluticasone propionate   110 MICROgram(s) HFA Inhaler 1 Puff(s) Inhalation daily  heparin  Infusion.  Unit(s)/Hr (14 mL/Hr) IV Continuous <Continuous>  insulin lispro (HumaLOG) corrective regimen sliding scale   SubCutaneous Before meals and at bedtime  Nephro-sophie 1 Tablet(s) Oral daily  pantoprazole   Suspension 40 milliGRAM(s) Oral before breakfast  tamsulosin 0.4 milliGRAM(s) Oral at bedtime    MEDICATIONS  (PRN):  ALBUTerol    90 MICROgram(s) HFA Inhaler 2 Puff(s) Inhalation every 6 hours PRN Shortness of Breath and/or Wheezing  dextrose Gel 1 Dose(s) Oral once PRN Blood Glucose LESS THAN 70 milliGRAM(s)/deciliter  glucagon  Injectable 1 milliGRAM(s) IntraMuscular once PRN Glucose LESS THAN 70 milligrams/deciliter  heparin  Injectable 6500 Unit(s) IV Push every 6 hours PRN For aPTT less than 40  heparin  Injectable 3000 Unit(s) IV Push every 6 hours PRN For aPTT between 40 - 57      Vital Signs Last 24 Hrs  T(C): 37.3 (15 Nov 2017 04:12), Max: 37.3 (15 Nov 2017 04:12)  T(F): 99.2 (15 Nov 2017 04:12), Max: 99.2 (15 Nov 2017 04:12)  HR: 88 (15 Nov 2017 04:12) (73 - 93)  BP: 107/71 (15 Nov 2017 04:12) (104/66 - 108/69)  BP(mean): --  RR: 18 (15 Nov 2017 04:12) (17 - 18)  SpO2: 97% (15 Nov 2017 04:12) (95% - 97%)  CAPILLARY BLOOD GLUCOSE    POCT  Blood Glucose (11.15.17 @ 06:53)    POCT Blood Glucose.: 144 mg/dL  POCT Blood Glucose.: 138 mg/dL (14 Nov 2017 21:30)  POCT Blood Glucose.: 161 mg/dL (14 Nov 2017 17:12)  POCT Blood Glucose.: 130 mg/dL (14 Nov 2017 12:09)  POCT Blood Glucose.: 176 mg/dL (14 Nov 2017 07:46)    I&O's Summary    13 Nov 2017 07:01  -  14 Nov 2017 07:00  --------------------------------------------------------  IN: 778 mL / OUT: 0 mL / NET: 778 mL    14 Nov 2017 07:01  -  15 Nov 2017 06:15  --------------------------------------------------------  IN: 580 mL / OUT: 0 mL / NET: 580 mL        PHYSICAL EXAM:  GENERAL: NAD, well-developed  HEAD:  Atraumatic, Normocephalic  EYES: conjunctiva and sclera clear  NECK: Supple, No JVD  CHEST/LUNG: Clear to auscultation bilaterally; No wheeze  HEART: Regular rate and rhythm; no murmurs, rubs, or gallops  ABDOMEN: Soft, Nontender, Nondistended; Bowel sounds present  BACK: Bleeding papular lesion in left upper back  EXTREMITIES:  2+ Peripheral Pulses, No clubbing, cyanosis, or edema  PSYCH: awake and alert, not following commands  SKIN: sacral decubitus stage 3 ulcer present, dressing overlying permacath site CDI    LABS:                                   9.4    8.07  )-----------( 280      ( 15 Nov 2017 07:03 )             30.3     WBC Trend: 8.07<--, 7.89<--, 7.8<--, 7.8<--  11-15    137  |  94<L>  |  50<H>  ----------------------------<  117<H>  4.3   |  20<L>  |  5.03<H>    Ca    9.4      15 Nov 2017 07:34    Creatinine Trend: 5.03<--, 3.66<--, 5.70<--, 4.29<--, 3.77<--, 6.02<--, 4.91<--  Prothrombin Time and INR, Plasma in AM (11.15.17 @ 07:00)    Prothrombin Time, Plasma: 13.7 sec    INR: 1.21: Recommended ranges for therapeutic INR:    2.0-3.0 for most medical and surgical thromboembolic states    2.0-3.0 for atrial fibrillation    2.0-3.0 for bileaflet mechanical valve in aortic position    2.5-3.5 for mechanical heart valves    Chest 2004;126:[id number]  The presence of direct thrombin inhibitors (argatroban, refludan)  may falsely increase results. ratio     Activated Partial Thromboplastin Time in AM (11.15.17 @ 07:00)    Activated Partial Thromboplastin Time: 66.5: The recommended therapeutic heparin range(full dose) is 58-99 seconds.  Recommended therapeutic Argatroban range is 1.5 to 3.0 times the baseline  APTT value, not to exceed 100 seconds. Recommended therapeutic Refludan  range is 1.5 to 2.5 times the baseline APTT. sec    Hepatitis C Antibody Test (11.13.17 @ 21:57)    Hepatitis C Virus S/CO Ratio: 0.30 S/CO    Hepatitis C Virus Interpretation: Nonreact: Hepatitis C AB  S/CO Ratio                        Interpretation  < 1.0                                     Non-Reactive  1.0 - 4.9                           Weakly-Reactive  > 5.0                                 Reactive  Non-Reactive: A person witha non-reactive HCV antibody result is  considered uninfected.  No further action is needed unless recent  infection is suspected.  In these cases, consider repeat testing later to  detect seroconversion..  Weakly-Reactive: HCV antibody test is abnormal, HCV RNA Qualitative test  will follow.  Reactive: HCV antibody test is abnormal, HCV RNA Qualitative test will  follow.  Note: HCV antibody testing is performed on the Abbott  system.    Hepatitis B Surface AB Quantitative (11.13.17 @ 21:57)    Hepatitis B Surface AB Quantitative: <3.0:   HEPBSAB Quantitative Interpretation:  > 12.0         mIU/mL     Immune  8.0-12.0      mIU/mL     Borderline  < 8.0           mIU/mL     Non-Immune mIU/mL    Hepatitis B Surface Antigen (11.13.17 @ 21:57)    Hepatitis B Surface Antigen: Nonreact    RADIOLOGY & ADDITIONAL TESTS:    Consultant(s) Notes Reviewed: Infectious Disease, Nephrology, Vascular Surgery, Infectious Diseases    CONTACT #: [id number]

## 2017-11-15 NOTE — PROGRESS NOTE ADULT - ASSESSMENT
A/P: 72y Male planned for above procedure  NPO past midnight, except medications  IVF when NPO as per primary team (pt is renal)  Pain/nausea control  AM labs, CBC, BMP mg, Phos, Coags , type and screen still active til 11/16  Consent to be obtained A/P: 72y Male planned for above procedure  NPO past midnight, except medications  IVF when NPO as per primary team (pt is renal), spoke with team 4 Medicine [phone], pager [phone]  Pain/nausea control  AM labs, CBC, BMP mg, Phos, Coags , type and screen still active til 11/16  Consent to be obtained

## 2017-11-15 NOTE — PROGRESS NOTE ADULT - ASSESSMENT
73 yo with DM,ESRD recent HD,CVA ,non verbal.Recent bacteremia,pyelo s/p antimicrobia   hypotension during HD  CNS bacteremia-?HD cath related  s/p replacement  Repeat Cx negative  Continue IV vanco-14 days from HD cath removal  Other plan per primary          Will Follow.  Beeper [phone]-kemt/afterhours/No response-[phone]

## 2017-11-16 LAB
ANION GAP SERPL CALC-SCNC: 20 MMOL/L — HIGH (ref 5–17)
APTT BLD: 30.8 SEC — SIGNIFICANT CHANGE UP (ref 27.5–37.4)
BUN SERPL-MCNC: 36 MG/DL — HIGH (ref 7–23)
CALCIUM SERPL-MCNC: 9.9 MG/DL — SIGNIFICANT CHANGE UP (ref 8.4–10.5)
CHLORIDE SERPL-SCNC: 99 MMOL/L — SIGNIFICANT CHANGE UP (ref 96–108)
CO2 SERPL-SCNC: 21 MMOL/L — LOW (ref 22–31)
CREAT SERPL-MCNC: 4.11 MG/DL — HIGH (ref 0.5–1.3)
GLUCOSE BLDC GLUCOMTR-MCNC: 125 MG/DL — HIGH (ref 70–99)
GLUCOSE BLDC GLUCOMTR-MCNC: 129 MG/DL — HIGH (ref 70–99)
GLUCOSE BLDC GLUCOMTR-MCNC: 171 MG/DL — HIGH (ref 70–99)
GLUCOSE SERPL-MCNC: 121 MG/DL — HIGH (ref 70–99)
HBV CORE AB SER-ACNC: SIGNIFICANT CHANGE UP
HBV SURFACE AB SER-ACNC: <3 MIU/ML — LOW
HBV SURFACE AG SER-ACNC: SIGNIFICANT CHANGE UP
HCT VFR BLD CALC: 30.8 % — LOW (ref 39–50)
HGB BLD-MCNC: 9.4 G/DL — LOW (ref 13–17)
INR BLD: 1.16 RATIO — SIGNIFICANT CHANGE UP (ref 0.88–1.16)
MAGNESIUM SERPL-MCNC: 2 MG/DL — SIGNIFICANT CHANGE UP (ref 1.6–2.6)
MCHC RBC-ENTMCNC: 27.7 PG — SIGNIFICANT CHANGE UP (ref 27–34)
MCHC RBC-ENTMCNC: 30.5 GM/DL — LOW (ref 32–36)
MCV RBC AUTO: 90.9 FL — SIGNIFICANT CHANGE UP (ref 80–100)
PHOSPHATE SERPL-MCNC: 3.1 MG/DL — SIGNIFICANT CHANGE UP (ref 2.5–4.5)
PLATELET # BLD AUTO: 257 K/UL — SIGNIFICANT CHANGE UP (ref 150–400)
POTASSIUM SERPL-MCNC: 4.4 MMOL/L — SIGNIFICANT CHANGE UP (ref 3.5–5.3)
POTASSIUM SERPL-SCNC: 4.4 MMOL/L — SIGNIFICANT CHANGE UP (ref 3.5–5.3)
PROTHROM AB SERPL-ACNC: 13.2 SEC — HIGH (ref 10–13.1)
RBC # BLD: 3.39 M/UL — LOW (ref 4.2–5.8)
RBC # FLD: 15.2 % — HIGH (ref 10.3–14.5)
SODIUM SERPL-SCNC: 140 MMOL/L — SIGNIFICANT CHANGE UP (ref 135–145)
WBC # BLD: 7.38 K/UL — SIGNIFICANT CHANGE UP (ref 3.8–10.5)
WBC # FLD AUTO: 7.38 K/UL — SIGNIFICANT CHANGE UP (ref 3.8–10.5)

## 2017-11-16 PROCEDURE — 99233 SBSQ HOSP IP/OBS HIGH 50: CPT | Mod: GC

## 2017-11-16 PROCEDURE — 36830 ARTERY-VEIN NONAUTOGRAFT: CPT | Mod: GC

## 2017-11-16 PROCEDURE — 99232 SBSQ HOSP IP/OBS MODERATE 35: CPT

## 2017-11-16 RX ORDER — HEPARIN SODIUM 5000 [USP'U]/ML
5000 INJECTION INTRAVENOUS; SUBCUTANEOUS EVERY 12 HOURS
Qty: 0 | Refills: 0 | Status: DISCONTINUED | OUTPATIENT
Start: 2017-11-16 | End: 2017-11-17

## 2017-11-16 RX ORDER — TAMSULOSIN HYDROCHLORIDE 0.4 MG/1
0.4 CAPSULE ORAL AT BEDTIME
Qty: 0 | Refills: 0 | Status: DISCONTINUED | OUTPATIENT
Start: 2017-11-16 | End: 2017-11-21

## 2017-11-16 RX ORDER — ATORVASTATIN CALCIUM 80 MG/1
40 TABLET, FILM COATED ORAL AT BEDTIME
Qty: 0 | Refills: 0 | Status: DISCONTINUED | OUTPATIENT
Start: 2017-11-16 | End: 2017-11-21

## 2017-11-16 RX ORDER — HYDROMORPHONE HYDROCHLORIDE 2 MG/ML
0.25 INJECTION INTRAMUSCULAR; INTRAVENOUS; SUBCUTANEOUS
Qty: 0 | Refills: 0 | Status: DISCONTINUED | OUTPATIENT
Start: 2017-11-16 | End: 2017-11-16

## 2017-11-16 RX ORDER — BACITRACIN ZINC 500 UNIT/G
1 OINTMENT IN PACKET (EA) TOPICAL
Qty: 0 | Refills: 0 | Status: DISCONTINUED | OUTPATIENT
Start: 2017-11-16 | End: 2017-11-21

## 2017-11-16 RX ORDER — AMIODARONE HYDROCHLORIDE 400 MG/1
200 TABLET ORAL DAILY
Qty: 0 | Refills: 0 | Status: DISCONTINUED | OUTPATIENT
Start: 2017-11-16 | End: 2017-11-21

## 2017-11-16 RX ORDER — PANTOPRAZOLE SODIUM 20 MG/1
40 TABLET, DELAYED RELEASE ORAL
Qty: 0 | Refills: 0 | Status: DISCONTINUED | OUTPATIENT
Start: 2017-11-16 | End: 2017-11-21

## 2017-11-16 RX ORDER — ONDANSETRON 8 MG/1
4 TABLET, FILM COATED ORAL
Qty: 0 | Refills: 0 | Status: DISCONTINUED | OUTPATIENT
Start: 2017-11-16 | End: 2017-11-16

## 2017-11-16 RX ORDER — CLOTRIMAZOLE AND BETAMETHASONE DIPROPIONATE 10; .5 MG/G; MG/G
1 CREAM TOPICAL
Qty: 0 | Refills: 0 | Status: DISCONTINUED | OUTPATIENT
Start: 2017-11-16 | End: 2017-11-21

## 2017-11-16 RX ORDER — ASCORBIC ACID 60 MG
500 TABLET,CHEWABLE ORAL
Qty: 0 | Refills: 0 | Status: DISCONTINUED | OUTPATIENT
Start: 2017-11-16 | End: 2017-11-18

## 2017-11-16 RX ORDER — ACETAMINOPHEN 500 MG
1000 TABLET ORAL ONCE
Qty: 0 | Refills: 0 | Status: COMPLETED | OUTPATIENT
Start: 2017-11-16 | End: 2017-11-16

## 2017-11-16 RX ORDER — INSULIN LISPRO 100/ML
VIAL (ML) SUBCUTANEOUS
Qty: 0 | Refills: 0 | Status: DISCONTINUED | OUTPATIENT
Start: 2017-11-16 | End: 2017-11-21

## 2017-11-16 RX ORDER — ASPIRIN/CALCIUM CARB/MAGNESIUM 324 MG
81 TABLET ORAL DAILY
Qty: 0 | Refills: 0 | Status: DISCONTINUED | OUTPATIENT
Start: 2017-11-16 | End: 2017-11-21

## 2017-11-16 RX ORDER — CLOPIDOGREL BISULFATE 75 MG/1
75 TABLET, FILM COATED ORAL DAILY
Qty: 0 | Refills: 0 | Status: DISCONTINUED | OUTPATIENT
Start: 2017-11-16 | End: 2017-11-21

## 2017-11-16 RX ORDER — FLUTICASONE PROPIONATE 220 MCG
1 AEROSOL WITH ADAPTER (GRAM) INHALATION DAILY
Qty: 0 | Refills: 0 | Status: DISCONTINUED | OUTPATIENT
Start: 2017-11-16 | End: 2017-11-21

## 2017-11-16 RX ORDER — ACETAMINOPHEN 500 MG
1000 TABLET ORAL ONCE
Qty: 0 | Refills: 0 | Status: COMPLETED | OUTPATIENT
Start: 2017-11-17 | End: 2017-11-17

## 2017-11-16 RX ORDER — ALBUTEROL 90 UG/1
2 AEROSOL, METERED ORAL EVERY 6 HOURS
Qty: 0 | Refills: 0 | Status: DISCONTINUED | OUTPATIENT
Start: 2017-11-16 | End: 2017-11-21

## 2017-11-16 RX ORDER — ERYTHROPOIETIN 10000 [IU]/ML
10000 INJECTION, SOLUTION INTRAVENOUS; SUBCUTANEOUS EVERY OTHER DAY
Qty: 0 | Refills: 0 | Status: DISCONTINUED | OUTPATIENT
Start: 2017-11-16 | End: 2017-11-21

## 2017-11-16 RX ADMIN — Medication 1 DROP(S): at 05:49

## 2017-11-16 RX ADMIN — Medication 500 MILLIGRAM(S): at 05:48

## 2017-11-16 RX ADMIN — AMIODARONE HYDROCHLORIDE 200 MILLIGRAM(S): 400 TABLET ORAL at 05:48

## 2017-11-16 RX ADMIN — HEPARIN SODIUM 5000 UNIT(S): 5000 INJECTION INTRAVENOUS; SUBCUTANEOUS at 21:46

## 2017-11-16 RX ADMIN — TAMSULOSIN HYDROCHLORIDE 0.4 MILLIGRAM(S): 0.4 CAPSULE ORAL at 21:45

## 2017-11-16 RX ADMIN — Medication 5 MILLIGRAM(S): at 05:48

## 2017-11-16 RX ADMIN — CLOTRIMAZOLE AND BETAMETHASONE DIPROPIONATE 1 APPLICATION(S): 10; .5 CREAM TOPICAL at 05:49

## 2017-11-16 RX ADMIN — Medication 1 APPLICATION(S): at 05:49

## 2017-11-16 RX ADMIN — Medication 400 MILLIGRAM(S): at 21:45

## 2017-11-16 RX ADMIN — Medication 1000 MILLIGRAM(S): at 22:15

## 2017-11-16 RX ADMIN — Medication 1: at 22:47

## 2017-11-16 RX ADMIN — Medication 1 DROP(S): at 19:39

## 2017-11-16 RX ADMIN — Medication 5 MILLIGRAM(S): at 22:46

## 2017-11-16 RX ADMIN — ATORVASTATIN CALCIUM 40 MILLIGRAM(S): 80 TABLET, FILM COATED ORAL at 21:44

## 2017-11-16 NOTE — PROGRESS NOTE ADULT - PROBLEM SELECTOR PLAN 10
DVT PPX: holding Coumadin as patient is for AVF placement today, will resume heparin gtt after procedure and ultimately bridge back to Coumadin    Dispo: PT recommends d/c to [facility]

## 2017-11-16 NOTE — PROGRESS NOTE ADULT - ASSESSMENT
71 yo with DM,ESRD recent HD,CVA ,non verbal.Recent bacteremia,pyelo s/p antimicrobia   hypotension during HD  CNS bacteremia-?HD cath related  s/p replacement  Repeat Cx negative  Continue IV vanco till 11/23-plan as detailed earlier  Other plan per primary          Will Follow.  Beeper [id number]-auvu/afterhours/No response-[phone]

## 2017-11-16 NOTE — PROGRESS NOTE ADULT - SUBJECTIVE AND OBJECTIVE BOX
Patient is a 72y old  Male who presents with a chief complaint of s/p cardiac cath (24 Oct 2017 15:51)    Being followed by ID for bacteremia    Interval history:For avf  No acute events      ROS:  Not obtainable    Antimicrobials:  vanco post HD-last 11/15    Other medications reviewed    Vital Signs Last 24 Hrs  T(C): 36.9 (11-16-17 @ 04:57), Max: 36.9 (11-15-17 @ 19:33)  T(F): 98.5 (11-16-17 @ 04:57), Max: 98.5 (11-16-17 @ 04:57)  HR: 86 (11-16-17 @ 04:57) (86 - 99)  BP: 123/74 (11-16-17 @ 04:57) (115/65 - 135/80)  BP(mean): --  RR: 18 (11-16-17 @ 04:57) (18 - 18)  SpO2: 98% (11-16-17 @ 04:57) (96% - 100%)    Physical Exam:    Constitutional ,comfortable,    HEENT PERRLA No pallor or icterus    No oral exudate or erythema    Neck supple no JVD or LN    R Sc Hd catheter no erythema or tenderness    Chest Good AE,CTA    CVS RRR S1 S2 WNl No murmur or rub or gallop    Abd soft BS normal No tenderness no masses    Ext No cyanosis clubbing or edema    IV site no erythema tenderness or discharge    Joints no swelling or LOM    CNS non verbal    Lab Data:                          9.4    7.38  )-----------( 257      ( 16 Nov 2017 08:00 )             30.8       11-15    137  |  94<L>  |  50<H>  ----------------------------<  117<H>  4.3   |  20<L>  |  5.03<H>    Ca    9.4      15 Nov 2017 07:34

## 2017-11-16 NOTE — PROGRESS NOTE ADULT - PROBLEM SELECTOR PLAN 9
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 Has been stable  - c/w ASA, Plavix, statin  - holding Coumadin as patient is for AVF placement today, will resume heparin gtt after procedure and ultimately bridge back to Coumadin

## 2017-11-16 NOTE — PROGRESS NOTE ADULT - SUBJECTIVE AND OBJECTIVE BOX
No distress, non-verbal    Vital Signs Last 24 Hrs  T(C): 36.9 (11-16-17 @ 21:15), Max: 36.9 (11-16-17 @ 04:57)  T(F): 98.5 (11-16-17 @ 21:15), Max: 98.5 (11-16-17 @ 04:57)  HR: 93 (11-16-17 @ 21:15) (77 - 96)  BP: 128/77 (11-16-17 @ 21:15) (123/74 - 160/82)  BP(mean): 91 (11-16-17 @ 17:15) (87 - 96)  RR: 18 (11-16-17 @ 21:15) (14 - 18)  SpO2: 96% (11-16-17 @ 21:15) (93% - 100%)    Respiratory: b/l air entry, clear  Cardiovascular: S1 S2 regular  Gastrointestinal: soft, NT, BS present  Extremities: no edema     ALBUTerol    90 MICROgram(s) HFA Inhaler 2 Puff(s) Inhalation every 6 hours PRN  amiodarone    Tablet 200 milliGRAM(s) Oral daily  artificial  tears Solution 1 Drop(s) Both EYES four times a day  ascorbic acid 500 milliGRAM(s) Oral two times a day  aspirin  chewable 81 milliGRAM(s) Oral daily  atorvastatin 40 milliGRAM(s) Oral at bedtime  BACItracin   Ointment 1 Application(s) Topical two times a day  busPIRone 5 milliGRAM(s) Oral two times a day  clopidogrel Tablet 75 milliGRAM(s) Oral daily  clotrimazole/betamethasone Cream 1 Application(s) Topical two times a day  epoetin georgie Injectable 34605 Unit(s) IV Push every other day  fluticasone propionate   110 MICROgram(s) HFA Inhaler 1 Puff(s) Inhalation daily  heparin  Injectable 5000 Unit(s) SubCutaneous every 12 hours  insulin lispro (HumaLOG) corrective regimen sliding scale   SubCutaneous Before meals and at bedtime  Nephro-sophie 1 Tablet(s) Oral daily  pantoprazole   Suspension 40 milliGRAM(s) Oral before breakfast  tamsulosin 0.4 milliGRAM(s) Oral at bedtime                          9.4    7.38  )-----------( 257      ( 16 Nov 2017 08:00 )             30.8     16 Nov 2017 08:00    140    |  99     |  36     ----------------------------<  121    4.4     |  21     |  4.11     Ca    9.9        16 Nov 2017 08:00  Phos  3.1       16 Nov 2017 08:00  Mg     2.0       16 Nov 2017 08:00      Assessment and Recommendation:     ESRD on HD  Adm w/bacteremia, initially thought to be contam, but repeat bld cx on 10/24 and 11/2 positive for CNS  Perm Cath d/c-d after HD 11/6  F/u blood cx are negative  S/p new perm cath 10/9, AVF 11/16  HD MWF  Abx per ID  D/c planning to [facility]

## 2017-11-16 NOTE — PROGRESS NOTE ADULT - PROBLEM SELECTOR PLAN 2
ESRD on HD, nephrology on board Dr. Elif Solis - HD MWF via permcath.   - s/p permacath removal 11/6 and replacement on 11/9, received dialysis on 11/13  - next dialysis session to be tomorrow 11/17.   - Permacath no longer oozing clotted blood, will continue to monitor with dressing changes, unlikely IR will intervene urgently. If worsens, will touch base with them.   - Vascular sx planning for AVF placement in OR tomorrow 11/16 - coumadin being held and IV heparin gtt in place for anticoagulation, to be held morning of procedure (pt to be kept NPO as of midnight on 11/15, preoperative lab work ordered for AM of 11/14) ESRD on HD, nephrology on board Dr. Elif Solis - HD MWF via permcath.   - s/p permacath removal 11/6 and replacement on 11/9, received dialysis on 11/13  - next dialysis session to be tomorrow 11/17.   - Permacath dressing bloody but not fully saturated, will continue to monitor with dressing changes, unlikely IR will intervene urgently. If worsens, will touch base with them.   - Vascular sx planning for AVF placement in OR today - coumadin being held, will resume heparin gtt after procedure and ultimately bridge back to coumadin ESRD on HD, nephrology on board Dr. Elif Solis - HD MWF via permcath.   - s/p permacath removal 11/6 and replacement on 11/9, received dialysis on 11/13  - next dialysis session to be tomorrow 11/17.   - Permacath dressing bloody but not fully saturated, will continue to monitor with dressing changes, unlikely IR will intervene urgently. If worsens, will touch base with them.   - Vascular sx planning for AVF placement in OR today - coumadin being held, will resume heparin gtt after procedure and ultimately bridge back to coumadin; will f/u w/ vascular surgery

## 2017-11-16 NOTE — PROGRESS NOTE ADULT - PROBLEM SELECTOR PLAN 3
- holding Coumadin as patient is for AVF placement today, will resume heparin gtt after procedure and ultimately bridge back to Coumadin

## 2017-11-16 NOTE — BRIEF OPERATIVE NOTE - OPERATION/FINDINGS
R arm brachio-brachial arteriovenous fistula with graft R arm brachio-basilic arteriovenous fistula with graft

## 2017-11-16 NOTE — BRIEF OPERATIVE NOTE - COMMENTS
-continue arm elevation/ace wrap x 24 hours -palpable graft thrill, triphasic radial and ulnar signals at end of case  -continue arm elevation/ace wrap x 24 hours

## 2017-11-16 NOTE — PROGRESS NOTE ADULT - SUBJECTIVE AND OBJECTIVE BOX
Patient is a 72y old  Male who presents with a chief complaint of s/p cardiac cath (24 Oct 2017 15:51)      SUBJECTIVE / OVERNIGHT EVENTS:  Patient seen and examined at bedside. No acute events overnight. Dressing overlying permacath CDI. Unable to obtain ROS as patient non-verbal with housestaff. For AVF placement by vascular surgery today.    MEDICATIONS  (STANDING):  amiodarone    Tablet 200 milliGRAM(s) Oral daily  artificial  tears Solution 1 Drop(s) Both EYES four times a day  ascorbic acid 500 milliGRAM(s) Oral two times a day  aspirin  chewable 81 milliGRAM(s) Oral daily  atorvastatin 40 milliGRAM(s) Oral at bedtime  BACItracin   Ointment 1 Application(s) Topical two times a day  busPIRone 5 milliGRAM(s) Oral two times a day  clopidogrel Tablet 75 milliGRAM(s) Oral daily  clotrimazole/betamethasone Cream 1 Application(s) Topical two times a day  dextrose 5%. 1000 milliLiter(s) (50 mL/Hr) IV Continuous <Continuous>  dextrose 50% Injectable 12.5 Gram(s) IV Push once  dextrose 50% Injectable 25 Gram(s) IV Push once  dextrose 50% Injectable 25 Gram(s) IV Push once  epoetin georgie Injectable 78902 Unit(s) IV Push every other day  fluticasone propionate   110 MICROgram(s) HFA Inhaler 1 Puff(s) Inhalation daily  insulin lispro (HumaLOG) corrective regimen sliding scale   SubCutaneous Before meals and at bedtime  Nephro-sophie 1 Tablet(s) Oral daily  pantoprazole   Suspension 40 milliGRAM(s) Oral before breakfast  tamsulosin 0.4 milliGRAM(s) Oral at bedtime    MEDICATIONS  (PRN):  ALBUTerol    90 MICROgram(s) HFA Inhaler 2 Puff(s) Inhalation every 6 hours PRN Shortness of Breath and/or Wheezing  dextrose Gel 1 Dose(s) Oral once PRN Blood Glucose LESS THAN 70 milliGRAM(s)/deciliter  glucagon  Injectable 1 milliGRAM(s) IntraMuscular once PRN Glucose LESS THAN 70 milligrams/deciliter      Vital Signs Last 24 Hrs  T(C): 36.9 (16 Nov 2017 04:57), Max: 37.2 (15 Nov 2017 08:50)  T(F): 98.5 (16 Nov 2017 04:57), Max: 98.9 (15 Nov 2017 08:50)  HR: 86 (16 Nov 2017 04:57) (86 - 99)  BP: 123/74 (16 Nov 2017 04:57) (115/65 - 136/63)  BP(mean): --  RR: 18 (16 Nov 2017 04:57) (18 - 18)  SpO2: 98% (16 Nov 2017 04:57) (96% - 100%)  CAPILLARY BLOOD GLUCOSE      POCT Blood Glucose.: 195 mg/dL (15 Nov 2017 21:30)  POCT Blood Glucose.: 171 mg/dL (15 Nov 2017 17:42)  POCT Blood Glucose.: 144 mg/dL (15 Nov 2017 15:47)  POCT Blood Glucose.: 144 mg/dL (15 Nov 2017 06:53)    I&O's Summary    14 Nov 2017 07:01  -  15 Nov 2017 07:00  --------------------------------------------------------  IN: 580 mL / OUT: 0 mL / NET: 580 mL    15 Nov 2017 07:01  -  16 Nov 2017 06:03  --------------------------------------------------------  IN: 549 mL / OUT: 0 mL / NET: 549 mL        PHYSICAL EXAM:  GENERAL: NAD, well-developed  HEAD:  Atraumatic, Normocephalic  EYES: conjunctiva and sclera clear  NECK: Supple, No JVD  CHEST/LUNG: Clear to auscultation bilaterally; No wheeze  HEART: Regular rate and rhythm; no murmurs, rubs, or gallops  ABDOMEN: Soft, Nontender, Nondistended; Bowel sounds present  EXTREMITIES:  2+ Peripheral Pulses, No clubbing, cyanosis, or edema  PSYCH: awake and alert, not following commands  SKIN: sacral decubitus stage 3 ulcer present, dressing overlying permacath site CDI    LABS:                        9.4    8.07  )-----------( 280      ( 15 Nov 2017 07:03 )             30.3     WBC Trend: 8.07<--, 7.89<--, 7.8<--  11-15    137  |  94<L>  |  50<H>  ----------------------------<  117<H>  4.3   |  20<L>  |  5.03<H>    Ca    9.4      15 Nov 2017 07:34      Creatinine Trend: 5.03<--, 3.66<--, 5.70<--, 4.29<--, 3.77<--, 6.02<--  PT/INR - ( 15 Nov 2017 07:00 )   PT: 13.7 sec;   INR: 1.21 ratio         PTT - ( 15 Nov 2017 17:20 )  PTT:65.0 sec            RADIOLOGY & ADDITIONAL TESTS:    Imaging Personally Reviewed:  CXR: < from: Xray Chest 1 View AP -PORTABLE-Routine (11.15.17 @ 12:53) >  Clear lungs.    Consultant(s) Notes Reviewed: Infectious Disease, Nephrology, Vascular Surgery    Care Discussed with Consultants/Other Providers:    CONTACT #: [id number] Patient is a 72y old  Male who presents with a chief complaint of s/p cardiac cath (24 Oct 2017 15:51)      SUBJECTIVE / OVERNIGHT EVENTS:  Patient seen and examined at bedside. No acute events overnight. Dressing overlying permacath CDI. Unable to obtain ROS as patient non-verbal with housestaff. For AVF placement by vascular surgery today.    MEDICATIONS  (STANDING):  amiodarone    Tablet 200 milliGRAM(s) Oral daily  artificial  tears Solution 1 Drop(s) Both EYES four times a day  ascorbic acid 500 milliGRAM(s) Oral two times a day  aspirin  chewable 81 milliGRAM(s) Oral daily  atorvastatin 40 milliGRAM(s) Oral at bedtime  BACItracin   Ointment 1 Application(s) Topical two times a day  busPIRone 5 milliGRAM(s) Oral two times a day  clopidogrel Tablet 75 milliGRAM(s) Oral daily  clotrimazole/betamethasone Cream 1 Application(s) Topical two times a day  dextrose 5%. 1000 milliLiter(s) (50 mL/Hr) IV Continuous <Continuous>  dextrose 50% Injectable 12.5 Gram(s) IV Push once  dextrose 50% Injectable 25 Gram(s) IV Push once  dextrose 50% Injectable 25 Gram(s) IV Push once  epoetin georgie Injectable [id number] Unit(s) IV Push every other day  fluticasone propionate   110 MICROgram(s) HFA Inhaler 1 Puff(s) Inhalation daily  insulin lispro (HumaLOG) corrective regimen sliding scale   SubCutaneous Before meals and at bedtime  Nephro-sophie 1 Tablet(s) Oral daily  pantoprazole   Suspension 40 milliGRAM(s) Oral before breakfast  tamsulosin 0.4 milliGRAM(s) Oral at bedtime    MEDICATIONS  (PRN):  ALBUTerol    90 MICROgram(s) HFA Inhaler 2 Puff(s) Inhalation every 6 hours PRN Shortness of Breath and/or Wheezing  dextrose Gel 1 Dose(s) Oral once PRN Blood Glucose LESS THAN 70 milliGRAM(s)/deciliter  glucagon  Injectable 1 milliGRAM(s) IntraMuscular once PRN Glucose LESS THAN 70 milligrams/deciliter      Vital Signs Last 24 Hrs  T(C): 36.9 (16 Nov 2017 04:57), Max: 37.2 (15 Nov 2017 08:50)  T(F): 98.5 (16 Nov 2017 04:57), Max: 98.9 (15 Nov 2017 08:50)  HR: 86 (16 Nov 2017 04:57) (86 - 99)  BP: 123/74 (16 Nov 2017 04:57) (115/65 - 136/63)  BP(mean): --  RR: 18 (16 Nov 2017 04:57) (18 - 18)  SpO2: 98% (16 Nov 2017 04:57) (96% - 100%)  CAPILLARY BLOOD GLUCOSE      POCT Blood Glucose.: 195 mg/dL (15 Nov 2017 21:30)  POCT Blood Glucose.: 171 mg/dL (15 Nov 2017 17:42)  POCT Blood Glucose.: 144 mg/dL (15 Nov 2017 15:47)  POCT Blood Glucose.: 144 mg/dL (15 Nov 2017 06:53)    I&O's Summary    14 Nov 2017 07:01  -  15 Nov 2017 07:00  --------------------------------------------------------  IN: 580 mL / OUT: 0 mL / NET: 580 mL    15 Nov 2017 07:01  -  16 Nov 2017 06:03  --------------------------------------------------------  IN: 549 mL / OUT: 0 mL / NET: 549 mL        PHYSICAL EXAM:  GENERAL: NAD, well-developed  HEAD:  Atraumatic, Normocephalic  EYES: conjunctiva and sclera clear  NECK: Supple, No JVD  CHEST/LUNG: Clear to auscultation bilaterally; No wheeze  HEART: Regular rate and rhythm; no murmurs, rubs, or gallops  ABDOMEN: Soft, Nontender, Nondistended; Bowel sounds present  EXTREMITIES:  2+ Peripheral Pulses, No clubbing, cyanosis, or edema  PSYCH: awake and alert, not following commands  SKIN: sacral decubitus stage 3 ulcer present, dressing overlying permacath site CDI    LABS:                        9.4    8.07  )-----------( 280      ( 15 Nov 2017 07:03 )             30.3     WBC Trend: 8.07<--, 7.89<--, 7.8<--  11-15    137  |  94<L>  |  50<H>  ----------------------------<  117<H>  4.3   |  20<L>  |  5.03<H>    Ca    9.4      15 Nov 2017 07:34      Creatinine Trend: 5.03<--, 3.66<--, 5.70<--, 4.29<--, 3.77<--, 6.02<--  PT/INR - ( 15 Nov 2017 07:00 )   PT: 13.7 sec;   INR: 1.21 ratio         PTT - ( 15 Nov 2017 17:20 )  PTT:65.0 sec    Hepatitis B Core Antibody, Total (11.15.17 @ 10:55)    Hepatitis B Core Antibody, Total: Nonreact    Hepatitis B Surface AB Quantitative (11.15.17 @ 10:55)    Hepatitis B Surface AB Quantitative: <3.0:   HEPBSAB Quantitative Interpretation:  > 12.0         mIU/mL     Immune  8.0-12.0      mIU/mL     Borderline  < 8.0           mIU/mL     Non-Immune mIU/mL    Hepatitis B Surface Antigen (11.15.17 @ 10:55)    Hepatitis B Surface Antigen: Nonreact    RADIOLOGY & ADDITIONAL TESTS:    Imaging Personally Reviewed:  CXR: < from: Xray Chest 1 View AP -PORTABLE-Routine (11.15.17 @ 12:53) >  Clear lungs.    Consultant(s) Notes Reviewed: Infectious Disease, Nephrology, Vascular Surgery    Care Discussed with Consultants/Other Providers:    CONTACT #: [id number] Patient is a 72y old  Male who presents with a chief complaint of s/p cardiac cath (24 Oct 2017 15:51)      SUBJECTIVE / OVERNIGHT EVENTS:  Patient seen and examined at bedside. No acute events overnight. Dressing overlying permacath bloody but not fully saturated. Unable to obtain ROS as patient non-verbal with housestaff. For AVF placement by vascular surgery today.    MEDICATIONS  (STANDING):  amiodarone    Tablet 200 milliGRAM(s) Oral daily  artificial  tears Solution 1 Drop(s) Both EYES four times a day  ascorbic acid 500 milliGRAM(s) Oral two times a day  aspirin  chewable 81 milliGRAM(s) Oral daily  atorvastatin 40 milliGRAM(s) Oral at bedtime  BACItracin   Ointment 1 Application(s) Topical two times a day  busPIRone 5 milliGRAM(s) Oral two times a day  clopidogrel Tablet 75 milliGRAM(s) Oral daily  clotrimazole/betamethasone Cream 1 Application(s) Topical two times a day  dextrose 5%. 1000 milliLiter(s) (50 mL/Hr) IV Continuous <Continuous>  dextrose 50% Injectable 12.5 Gram(s) IV Push once  dextrose 50% Injectable 25 Gram(s) IV Push once  dextrose 50% Injectable 25 Gram(s) IV Push once  epoetin georgie Injectable 14672 Unit(s) IV Push every other day  fluticasone propionate   110 MICROgram(s) HFA Inhaler 1 Puff(s) Inhalation daily  insulin lispro (HumaLOG) corrective regimen sliding scale   SubCutaneous Before meals and at bedtime  Nephro-sophie 1 Tablet(s) Oral daily  pantoprazole   Suspension 40 milliGRAM(s) Oral before breakfast  tamsulosin 0.4 milliGRAM(s) Oral at bedtime    MEDICATIONS  (PRN):  ALBUTerol    90 MICROgram(s) HFA Inhaler 2 Puff(s) Inhalation every 6 hours PRN Shortness of Breath and/or Wheezing  dextrose Gel 1 Dose(s) Oral once PRN Blood Glucose LESS THAN 70 milliGRAM(s)/deciliter  glucagon  Injectable 1 milliGRAM(s) IntraMuscular once PRN Glucose LESS THAN 70 milligrams/deciliter      Vital Signs Last 24 Hrs  T(C): 36.9 (16 Nov 2017 04:57), Max: 37.2 (15 Nov 2017 08:50)  T(F): 98.5 (16 Nov 2017 04:57), Max: 98.9 (15 Nov 2017 08:50)  HR: 86 (16 Nov 2017 04:57) (86 - 99)  BP: 123/74 (16 Nov 2017 04:57) (115/65 - 136/63)  BP(mean): --  RR: 18 (16 Nov 2017 04:57) (18 - 18)  SpO2: 98% (16 Nov 2017 04:57) (96% - 100%)  CAPILLARY BLOOD GLUCOSE      POCT Blood Glucose.: 195 mg/dL (15 Nov 2017 21:30)  POCT Blood Glucose.: 171 mg/dL (15 Nov 2017 17:42)  POCT Blood Glucose.: 144 mg/dL (15 Nov 2017 15:47)  POCT Blood Glucose.: 144 mg/dL (15 Nov 2017 06:53)    I&O's Summary    14 Nov 2017 07:01  -  15 Nov 2017 07:00  --------------------------------------------------------  IN: 580 mL / OUT: 0 mL / NET: 580 mL    15 Nov 2017 07:01  -  16 Nov 2017 06:03  --------------------------------------------------------  IN: 549 mL / OUT: 0 mL / NET: 549 mL        PHYSICAL EXAM:  GENERAL: NAD, well-developed  HEAD:  Atraumatic, Normocephalic  EYES: conjunctiva and sclera clear  NECK: Supple, No JVD  CHEST/LUNG: Clear to auscultation bilaterally; No wheeze  HEART: Regular rate and rhythm; no murmurs, rubs, or gallops  ABDOMEN: Soft, Nontender, Nondistended; Bowel sounds present  EXTREMITIES:  2+ Peripheral Pulses, No clubbing, cyanosis, or edema  PSYCH: awake and alert, not following commands  SKIN: sacral decubitus stage 3 ulcer present, dressing overlying permacath site bloody but not fully saturated	    LABS:                        9.4    8.07  )-----------( 280      ( 15 Nov 2017 07:03 )             30.3     WBC Trend: 8.07<--, 7.89<--, 7.8<--  11-15    137  |  94<L>  |  50<H>  ----------------------------<  117<H>  4.3   |  20<L>  |  5.03<H>    Ca    9.4      15 Nov 2017 07:34      Creatinine Trend: 5.03<--, 3.66<--, 5.70<--, 4.29<--, 3.77<--, 6.02<--  PT/INR - ( 15 Nov 2017 07:00 )   PT: 13.7 sec;   INR: 1.21 ratio         PTT - ( 15 Nov 2017 17:20 )  PTT:65.0 sec    Hepatitis B Core Antibody, Total (11.15.17 @ 10:55)    Hepatitis B Core Antibody, Total: Nonreact    Hepatitis B Surface AB Quantitative (11.15.17 @ 10:55)    Hepatitis B Surface AB Quantitative: <3.0:   HEPBSAB Quantitative Interpretation:  > 12.0         mIU/mL     Immune  8.0-12.0      mIU/mL     Borderline  < 8.0           mIU/mL     Non-Immune mIU/mL    Hepatitis B Surface Antigen (11.15.17 @ 10:55)    Hepatitis B Surface Antigen: Nonreact    RADIOLOGY & ADDITIONAL TESTS:    Imaging Personally Reviewed:  CXR: < from: Xray Chest 1 View AP -PORTABLE-Routine (11.15.17 @ 12:53) >  Clear lungs.    Consultant(s) Notes Reviewed: Infectious Disease, Nephrology, Vascular Surgery    Care Discussed with Consultants/Other Providers:    CONTACT #: [id number] Patient is a 72y old  Male who presents with a chief complaint of s/p cardiac cath (24 Oct 2017 15:51)      SUBJECTIVE / OVERNIGHT EVENTS:  Patient seen and examined at bedside. No acute events overnight. Dressing overlying permacath bloody but not fully saturated. Unable to obtain ROS as patient non-verbal with housestaff. For AVF placement by vascular surgery today.    MEDICATIONS  (STANDING):  amiodarone    Tablet 200 milliGRAM(s) Oral daily  artificial  tears Solution 1 Drop(s) Both EYES four times a day  ascorbic acid 500 milliGRAM(s) Oral two times a day  aspirin  chewable 81 milliGRAM(s) Oral daily  atorvastatin 40 milliGRAM(s) Oral at bedtime  BACItracin   Ointment 1 Application(s) Topical two times a day  busPIRone 5 milliGRAM(s) Oral two times a day  clopidogrel Tablet 75 milliGRAM(s) Oral daily  clotrimazole/betamethasone Cream 1 Application(s) Topical two times a day  dextrose 5%. 1000 milliLiter(s) (50 mL/Hr) IV Continuous <Continuous>  dextrose 50% Injectable 12.5 Gram(s) IV Push once  dextrose 50% Injectable 25 Gram(s) IV Push once  dextrose 50% Injectable 25 Gram(s) IV Push once  epoetin georgie Injectable 39596 Unit(s) IV Push every other day  fluticasone propionate   110 MICROgram(s) HFA Inhaler 1 Puff(s) Inhalation daily  insulin lispro (HumaLOG) corrective regimen sliding scale   SubCutaneous Before meals and at bedtime  Nephro-sophie 1 Tablet(s) Oral daily  pantoprazole   Suspension 40 milliGRAM(s) Oral before breakfast  tamsulosin 0.4 milliGRAM(s) Oral at bedtime    MEDICATIONS  (PRN):  ALBUTerol    90 MICROgram(s) HFA Inhaler 2 Puff(s) Inhalation every 6 hours PRN Shortness of Breath and/or Wheezing  dextrose Gel 1 Dose(s) Oral once PRN Blood Glucose LESS THAN 70 milliGRAM(s)/deciliter  glucagon  Injectable 1 milliGRAM(s) IntraMuscular once PRN Glucose LESS THAN 70 milligrams/deciliter      Vital Signs Last 24 Hrs  T(C): 36.9 (16 Nov 2017 04:57), Max: 37.2 (15 Nov 2017 08:50)  T(F): 98.5 (16 Nov 2017 04:57), Max: 98.9 (15 Nov 2017 08:50)  HR: 86 (16 Nov 2017 04:57) (86 - 99)  BP: 123/74 (16 Nov 2017 04:57) (115/65 - 136/63)  BP(mean): --  RR: 18 (16 Nov 2017 04:57) (18 - 18)  SpO2: 98% (16 Nov 2017 04:57) (96% - 100%)  CAPILLARY BLOOD GLUCOSE    POCT  Blood Glucose (11.16.17 @ 08:40)    POCT Blood Glucose.: 125 mg/dL  POCT Blood Glucose.: 195 mg/dL (15 Nov 2017 21:30)  POCT Blood Glucose.: 171 mg/dL (15 Nov 2017 17:42)  POCT Blood Glucose.: 144 mg/dL (15 Nov 2017 15:47)  POCT Blood Glucose.: 144 mg/dL (15 Nov 2017 06:53)    I&O's Summary    14 Nov 2017 07:01  -  15 Nov 2017 07:00  --------------------------------------------------------  IN: 580 mL / OUT: 0 mL / NET: 580 mL    15 Nov 2017 07:01  -  16 Nov 2017 06:03  --------------------------------------------------------  IN: 549 mL / OUT: 0 mL / NET: 549 mL        PHYSICAL EXAM:  GENERAL: NAD, well-developed  HEAD:  Atraumatic, Normocephalic  EYES: conjunctiva and sclera clear  NECK: Supple, No JVD  CHEST/LUNG: Clear to auscultation bilaterally; No wheeze  HEART: Regular rate and rhythm; no murmurs, rubs, or gallops  ABDOMEN: Soft, Nontender, Nondistended; Bowel sounds present  EXTREMITIES:  2+ Peripheral Pulses, No clubbing, cyanosis, or edema  PSYCH: awake and alert, not following commands  SKIN: sacral decubitus stage 3 ulcer present, dressing overlying permacath site bloody but not fully saturated	    LABS:                                   9.4    7.38  )-----------( 257      ( 16 Nov 2017 08:00 )             30.8     WBC Trend: 7.38<--, 8.07<--, 7.89<--, 7.8<--  11-15    137  |  94<L>  |  50<H>  ----------------------------<  117<H>  4.3   |  20<L>  |  5.03<H>    Ca    9.4      15 Nov 2017 07:34      Creatinine Trend: 5.03<--, 3.66<--, 5.70<--, 4.29<--, 3.77<--, 6.02<--  Prothrombin Time and INR, Plasma in AM (11.16.17 @ 08:00)    Prothrombin Time, Plasma: 13.2 sec    INR: 1.16: Recommended ranges for therapeutic INR:    2.0-3.0 for most medical and surgical thromboembolic states    2.0-3.0 for atrial fibrillation    2.0-3.0 for bileaflet mechanical valve in aortic position    2.5-3.5 for mechanical heart valves    Chest 2004;[id number]  The presence of direct thrombin inhibitors (argatroban, refludan)  may falsely increase results. ratio    Activated Partial Thromboplastin Time in AM (11.16.17 @ 08:00)    Activated Partial Thromboplastin Time: 30.8: The recommended therapeutic heparin range(full dose) is 58-99 seconds.  Recommended therapeutic Argatroban range is 1.5 to 3.0 times the baseline  APTT value, not to exceed 100 seconds. Recommended therapeutic Refludan  range is 1.5 to 2.5 times the baseline APTT. sec    Hepatitis B Core Antibody, Total (11.15.17 @ 10:55)    Hepatitis B Core Antibody, Total: Nonreact    Hepatitis B Surface AB Quantitative (11.15.17 @ 10:55)    Hepatitis B Surface AB Quantitative: <3.0:   HEPBSAB Quantitative Interpretation:  > 12.0         mIU/mL     Immune  8.0-12.0      mIU/mL     Borderline  < 8.0           mIU/mL     Non-Immune mIU/mL    Hepatitis B Surface Antigen (11.15.17 @ 10:55)    Hepatitis B Surface Antigen: Nonreact    RADIOLOGY & ADDITIONAL TESTS:    Imaging Personally Reviewed:  CXR: < from: Xray Chest 1 View AP -PORTABLE-Routine (11.15.17 @ 12:53) >  Clear lungs.    Consultant(s) Notes Reviewed: Infectious Disease, Nephrology, Vascular Surgery    Care Discussed with Consultants/Other Providers:    CONTACT #: [id number] Patient is a 72y old  Male who presents with a chief complaint of s/p cardiac cath (24 Oct 2017 15:51)    SUBJECTIVE / OVERNIGHT EVENTS:  Patient seen and examined at bedside. No acute events overnight. Dressing overlying permacath bloody but not fully saturated. Unable to obtain ROS as patient non-verbal with housestaff. For AVF placement by vascular surgery today.    MEDICATIONS  (STANDING):  amiodarone    Tablet 200 milliGRAM(s) Oral daily  artificial  tears Solution 1 Drop(s) Both EYES four times a day  ascorbic acid 500 milliGRAM(s) Oral two times a day  aspirin  chewable 81 milliGRAM(s) Oral daily  atorvastatin 40 milliGRAM(s) Oral at bedtime  BACItracin   Ointment 1 Application(s) Topical two times a day  busPIRone 5 milliGRAM(s) Oral two times a day  clopidogrel Tablet 75 milliGRAM(s) Oral daily  clotrimazole/betamethasone Cream 1 Application(s) Topical two times a day  dextrose 5%. 1000 milliLiter(s) (50 mL/Hr) IV Continuous <Continuous>  dextrose 50% Injectable 12.5 Gram(s) IV Push once  dextrose 50% Injectable 25 Gram(s) IV Push once  dextrose 50% Injectable 25 Gram(s) IV Push once  epoetin georgie Injectable 03552 Unit(s) IV Push every other day  fluticasone propionate   110 MICROgram(s) HFA Inhaler 1 Puff(s) Inhalation daily  insulin lispro (HumaLOG) corrective regimen sliding scale   SubCutaneous Before meals and at bedtime  Nephro-sophie 1 Tablet(s) Oral daily  pantoprazole   Suspension 40 milliGRAM(s) Oral before breakfast  tamsulosin 0.4 milliGRAM(s) Oral at bedtime    MEDICATIONS  (PRN):  ALBUTerol    90 MICROgram(s) HFA Inhaler 2 Puff(s) Inhalation every 6 hours PRN Shortness of Breath and/or Wheezing  dextrose Gel 1 Dose(s) Oral once PRN Blood Glucose LESS THAN 70 milliGRAM(s)/deciliter  glucagon  Injectable 1 milliGRAM(s) IntraMuscular once PRN Glucose LESS THAN 70 milligrams/deciliter    Vital Signs Last 24 Hrs  T(C): 36.9 (16 Nov 2017 04:57), Max: 37.2 (15 Nov 2017 08:50)  T(F): 98.5 (16 Nov 2017 04:57), Max: 98.9 (15 Nov 2017 08:50)  HR: 86 (16 Nov 2017 04:57) (86 - 99)  BP: 123/74 (16 Nov 2017 04:57) (115/65 - 136/63)  RR: 18 (16 Nov 2017 04:57) (18 - 18)  SpO2: 98% (16 Nov 2017 04:57) (96% - 100%)    CAPILLARY BLOOD GLUCOSE  POCT  Blood Glucose (11.16.17 @ 08:40)  POCT Blood Glucose.: 125 mg/dL  POCT Blood Glucose.: 195 mg/dL (15 Nov 2017 21:30)  POCT Blood Glucose.: 171 mg/dL (15 Nov 2017 17:42)  POCT Blood Glucose.: 144 mg/dL (15 Nov 2017 15:47)  POCT Blood Glucose.: 144 mg/dL (15 Nov 2017 06:53)    I&O's Summary  14 Nov 2017 07:01  -  15 Nov 2017 07:00  --------------------------------------------------------  IN: 580 mL / OUT: 0 mL / NET: 580 mL    15 Nov 2017 07:01  -  16 Nov 2017 06:03  --------------------------------------------------------  IN: 549 mL / OUT: 0 mL / NET: 549 mL    PHYSICAL EXAM:  GENERAL: NAD, well-developed  HEAD:  Atraumatic, Normocephalic  EYES: conjunctiva and sclera clear  NECK: Supple, No JVD  CHEST/LUNG: Clear to auscultation bilaterally; No wheeze  HEART: Regular rate and rhythm; no murmurs, rubs, or gallops  ABDOMEN: Soft, Nontender, Nondistended; Bowel sounds present  EXTREMITIES:  2+ Peripheral Pulses, No clubbing, cyanosis, or edema  PSYCH: awake and alert, not following commands  SKIN: sacral decubitus stage 3 ulcer present, dressing overlying permacath site bloody but not fully saturated	    LABS:                              9.4    7.38  )-----------( 257      ( 16 Nov 2017 08:00 )             30.8     WBC Trend: 7.38<--, 8.07<--, 7.89<--, 7.8<--  11-15    137  |  94<L>  |  50<H>  ----------------------------<  117<H>  4.3   |  20<L>  |  5.03<H>    Ca    9.4      15 Nov 2017 07:34    Creatinine Trend: 5.03<--, 3.66<--, 5.70<--, 4.29<--, 3.77<--, 6.02<--    Prothrombin Time and INR, Plasma in AM (11.16.17 @ 08:00)    Prothrombin Time, Plasma: 13.2 sec    INR: 1.16: Recommended ranges for therapeutic INR:    2.0-3.0 for most medical and surgical thromboembolic states    2.0-3.0 for atrial fibrillation    2.0-3.0 for bileaflet mechanical valve in aortic position    2.5-3.5 for mechanical heart valves    Chest 2004;126:[id number]  The presence of direct thrombin inhibitors (argatroban, refludan)  may falsely increase results. ratio    Activated Partial Thromboplastin Time in AM (11.16.17 @ 08:00)    Activated Partial Thromboplastin Time: 30.8: The recommended therapeutic heparin range(full dose) is 58-99 seconds.  Recommended therapeutic Argatroban range is 1.5 to 3.0 times the baseline  APTT value, not to exceed 100 seconds. Recommended therapeutic Refludan  range is 1.5 to 2.5 times the baseline APTT. sec    Hepatitis B Core Antibody, Total (11.15.17 @ 10:55)    Hepatitis B Core Antibody, Total: Nonreact    Hepatitis B Surface AB Quantitative (11.15.17 @ 10:55)    Hepatitis B Surface AB Quantitative: <3.0:   HEPBSAB Quantitative Interpretation:  > 12.0         mIU/mL     Immune  8.0-12.0      mIU/mL     Borderline  < 8.0           mIU/mL     Non-Immune mIU/mL    Hepatitis B Surface Antigen (11.15.17 @ 10:55)    Hepatitis B Surface Antigen: Nonreact    RADIOLOGY & ADDITIONAL TESTS:    Imaging Personally Reviewed:  CXR: < from: Xray Chest 1 View AP -PORTABLE-Routine (11.15.17 @ 12:53) >  Clear lungs.    Consultant(s) Notes Reviewed: Infectious Disease, Nephrology, Vascular Surgery    Care Discussed with Consultants/Other Providers:    CONTACT #: [id number] Patient is a 72y old  Male who presents with a chief complaint of s/p cardiac cath (24 Oct 2017 15:51)    SUBJECTIVE / OVERNIGHT EVENTS:  Patient seen and examined at bedside. No acute events overnight. Dressing overlying permacath bloody but not fully saturated. Unable to obtain ROS as patient non-verbal with housestaff. For AVF placement by vascular surgery today.    MEDICATIONS  (STANDING):  amiodarone    Tablet 200 milliGRAM(s) Oral daily  artificial  tears Solution 1 Drop(s) Both EYES four times a day  ascorbic acid 500 milliGRAM(s) Oral two times a day  aspirin  chewable 81 milliGRAM(s) Oral daily  atorvastatin 40 milliGRAM(s) Oral at bedtime  BACItracin   Ointment 1 Application(s) Topical two times a day  busPIRone 5 milliGRAM(s) Oral two times a day  clopidogrel Tablet 75 milliGRAM(s) Oral daily  clotrimazole/betamethasone Cream 1 Application(s) Topical two times a day  dextrose 5%. 1000 milliLiter(s) (50 mL/Hr) IV Continuous <Continuous>  dextrose 50% Injectable 12.5 Gram(s) IV Push once  dextrose 50% Injectable 25 Gram(s) IV Push once  dextrose 50% Injectable 25 Gram(s) IV Push once  epoetin georgie Injectable 85675 Unit(s) IV Push every other day  fluticasone propionate   110 MICROgram(s) HFA Inhaler 1 Puff(s) Inhalation daily  insulin lispro (HumaLOG) corrective regimen sliding scale   SubCutaneous Before meals and at bedtime  Nephro-sophie 1 Tablet(s) Oral daily  pantoprazole   Suspension 40 milliGRAM(s) Oral before breakfast  tamsulosin 0.4 milliGRAM(s) Oral at bedtime    MEDICATIONS  (PRN):  ALBUTerol    90 MICROgram(s) HFA Inhaler 2 Puff(s) Inhalation every 6 hours PRN Shortness of Breath and/or Wheezing  dextrose Gel 1 Dose(s) Oral once PRN Blood Glucose LESS THAN 70 milliGRAM(s)/deciliter  glucagon  Injectable 1 milliGRAM(s) IntraMuscular once PRN Glucose LESS THAN 70 milligrams/deciliter    Vital Signs Last 24 Hrs  T(C): 36.9 (16 Nov 2017 04:57), Max: 37.2 (15 Nov 2017 08:50)  T(F): 98.5 (16 Nov 2017 04:57), Max: 98.9 (15 Nov 2017 08:50)  HR: 86 (16 Nov 2017 04:57) (86 - 99)  BP: 123/74 (16 Nov 2017 04:57) (115/65 - 136/63)  RR: 18 (16 Nov 2017 04:57) (18 - 18)  SpO2: 98% (16 Nov 2017 04:57) (96% - 100%)    CAPILLARY BLOOD GLUCOSE  POCT  Blood Glucose (11.16.17 @ 08:40)  POCT Blood Glucose.: 125 mg/dL  POCT Blood Glucose.: 195 mg/dL (15 Nov 2017 21:30)  POCT Blood Glucose.: 171 mg/dL (15 Nov 2017 17:42)  POCT Blood Glucose.: 144 mg/dL (15 Nov 2017 15:47)  POCT Blood Glucose.: 144 mg/dL (15 Nov 2017 06:53)    I&O's Summary  14 Nov 2017 07:01  -  15 Nov 2017 07:00  --------------------------------------------------------  IN: 580 mL / OUT: 0 mL / NET: 580 mL    15 Nov 2017 07:01  -  16 Nov 2017 06:03  --------------------------------------------------------  IN: 549 mL / OUT: 0 mL / NET: 549 mL    PHYSICAL EXAM:  GENERAL: NAD, well-developed  HEAD:  Atraumatic, Normocephalic  EYES: conjunctiva and sclera clear  NECK: Supple, No JVD  CHEST/LUNG: Clear to auscultation bilaterally; No wheeze  HEART: Regular rate and rhythm; no murmurs, rubs, or gallops  ABDOMEN: Soft, Nontender, Nondistended; Bowel sounds present  EXTREMITIES:  2+ Peripheral Pulses, No clubbing, cyanosis, or edema  PSYCH: awake and alert, not following commands  SKIN: sacral decubitus stage 3 ulcer present, dressing overlying permacath site bloody but not fully saturated	    LABS:                              9.4    7.38  )-----------( 257      ( 16 Nov 2017 08:00 )             30.8     WBC Trend: 7.38<--, 8.07<--, 7.89<--, 7.8<--  11-16    140  |  99  |  36<H>  ----------------------------<  121<H>  4.4   |  21<L>  |  4.11<H>    Ca    9.9      16 Nov 2017 08:00  Phos  3.1     11-16  Mg     2.0     11-16    Creatinine Trend: 5.03<--, 3.66<--, 5.70<--, 4.29<--, 3.77<--, 6.02<--    Prothrombin Time and INR, Plasma in AM (11.16.17 @ 08:00)    Prothrombin Time, Plasma: 13.2 sec    INR: 1.16: Recommended ranges for therapeutic INR:    2.0-3.0 for most medical and surgical thromboembolic states    2.0-3.0 for atrial fibrillation    2.0-3.0 for bileaflet mechanical valve in aortic position    2.5-3.5 for mechanical heart valves    Chest 2004;126:[id number]  The presence of direct thrombin inhibitors (argatroban, refludan)  may falsely increase results. ratio    Activated Partial Thromboplastin Time in AM (11.16.17 @ 08:00)    Activated Partial Thromboplastin Time: 30.8: The recommended therapeutic heparin range(full dose) is 58-99 seconds.  Recommended therapeutic Argatroban range is 1.5 to 3.0 times the baseline  APTT value, not to exceed 100 seconds. Recommended therapeutic Refludan  range is 1.5 to 2.5 times the baseline APTT. sec    Hepatitis B Core Antibody, Total (11.15.17 @ 10:55)    Hepatitis B Core Antibody, Total: Nonreact    Hepatitis B Surface AB Quantitative (11.15.17 @ 10:55)    Hepatitis B Surface AB Quantitative: <3.0:   HEPBSAB Quantitative Interpretation:  > 12.0         mIU/mL     Immune  8.0-12.0      mIU/mL     Borderline  < 8.0           mIU/mL     Non-Immune mIU/mL    Hepatitis B Surface Antigen (11.15.17 @ 10:55)    Hepatitis B Surface Antigen: Nonreact    RADIOLOGY & ADDITIONAL TESTS:    Imaging Personally Reviewed:  CXR: < from: Xray Chest 1 View AP -PORTABLE-Routine (11.15.17 @ 12:53) >  Clear lungs.    Consultant(s) Notes Reviewed: Infectious Disease, Nephrology, Vascular Surgery    Care Discussed with Consultants/Other Providers:    CONTACT #: [id number]

## 2017-11-16 NOTE — PROGRESS NOTE ADULT - SUBJECTIVE AND OBJECTIVE BOX
POST-OPERATION NOTE  STATUS POST:  right arm brachio-basilic arteriovenous fistula with graft      SUBJECTIVE:   Patient was seen and examined this evening at 8:30 pm. There was no acute event post operation . He is resting comfortably in bed. Pain is well controlled. He does not report pain, fever, n/v, chest pain, or shortness of breath.     Vital Signs Last 24 Hrs  T(C): 36.9 (16 Nov 2017 21:15), Max: 36.9 (16 Nov 2017 04:57)  T(F): 98.5 (16 Nov 2017 21:15), Max: 98.5 (16 Nov 2017 04:57)  HR: 93 (16 Nov 2017 21:15) (77 - 96)  BP: 128/77 (16 Nov 2017 21:15) (123/74 - 160/82)  BP(mean): 91 (16 Nov 2017 17:15) (87 - 96)  RR: 18 (16 Nov 2017 21:15) (14 - 18)  SpO2: 96% (16 Nov 2017 21:15) (93% - 100%)  I&O's Summary    15 Nov 2017 07:01  -  16 Nov 2017 07:00  --------------------------------------------------------  IN: 549 mL / OUT: 0 mL / NET: 549 mL    16 Nov 2017 07:01  -  16 Nov 2017 23:40  --------------------------------------------------------  IN: 60 mL / OUT: 0 mL / NET: 60 mL      I&O's Detail    15 Nov 2017 07:01  -  16 Nov 2017 07:00  --------------------------------------------------------  IN:    heparin  Infusion.: 89 mL    Oral Fluid: 360 mL    Solution: 100 mL  Total IN: 549 mL    OUT:  Total OUT: 0 mL    Total NET: 549 mL      16 Nov 2017 07:01  -  16 Nov 2017 23:40  --------------------------------------------------------  IN:    Oral Fluid: 60 mL  Total IN: 60 mL    OUT:  Total OUT: 0 mL    Total NET: 60 mL          MEDICATIONS  (STANDING):  amiodarone    Tablet 200 milliGRAM(s) Oral daily  artificial  tears Solution 1 Drop(s) Both EYES four times a day  ascorbic acid 500 milliGRAM(s) Oral two times a day  aspirin  chewable 81 milliGRAM(s) Oral daily  atorvastatin 40 milliGRAM(s) Oral at bedtime  BACItracin   Ointment 1 Application(s) Topical two times a day  busPIRone 5 milliGRAM(s) Oral two times a day  clopidogrel Tablet 75 milliGRAM(s) Oral daily  clotrimazole/betamethasone Cream 1 Application(s) Topical two times a day  epoetin georgei Injectable 41950 Unit(s) IV Push every other day  fluticasone propionate   110 MICROgram(s) HFA Inhaler 1 Puff(s) Inhalation daily  heparin  Injectable 5000 Unit(s) SubCutaneous every 12 hours  insulin lispro (HumaLOG) corrective regimen sliding scale   SubCutaneous Before meals and at bedtime  Nephro-sophie 1 Tablet(s) Oral daily  pantoprazole   Suspension 40 milliGRAM(s) Oral before breakfast  tamsulosin 0.4 milliGRAM(s) Oral at bedtime    MEDICATIONS  (PRN):  ALBUTerol    90 MICROgram(s) HFA Inhaler 2 Puff(s) Inhalation every 6 hours PRN Shortness of Breath and/or Wheezing      LABS:                        9.4    7.38  )-----------( 257      ( 16 Nov 2017 08:00 )             30.8     11-16    140  |  99  |  36<H>  ----------------------------<  121<H>  4.4   |  21<L>  |  4.11<H>    Ca    9.9      16 Nov 2017 08:00  Phos  3.1     11-16  Mg     2.0     11-16      PT/INR - ( 16 Nov 2017 08:00 )   PT: 13.2 sec;   INR: 1.16 ratio         PTT - ( 16 Nov 2017 08:00 )  PTT:30.8 sec      RADIOLOGY & ADDITIONAL STUDIES:    PHYSICAL EXAM:  Gen: Well-nourished, well-developed, A&O x1, sleeping in bed in no acute distress  CV: RRR, normal S1, S2  Resp: CTAB, unlabored   Abdomen: Soft, nontender, nondistended  Extremities: All 4 extremities warm and well perfused, no edema   RUE: in ACE bondage and immobilizer, fingers are warm, well perfused with 1 second capillary refill, graft with good strong thrill, palpable radial pulse. dressing is little blood tinged with no active bleeding or hematoma/ecchymosis formation        A/P: 72y Male s/p right arm brachio-basilic arteriovenous fistula with graft    - will monitor radial/ulnar pulses  - Pain control: acetaminophen  - Diet: dysphagia pureed-honey consistency fluid  - Labs: will f/u with am labs  - DVT ppx:SQH    Anticoagulant: aspirin Plavix  -

## 2017-11-16 NOTE — PROGRESS NOTE ADULT - PROBLEM SELECTOR PLAN 1
Afebrile, No SOB. Cultures from 11/7 negative x2 after 5 days. S/p Permacath removal on 11/6 and replacement on 11/9.   - c/w IV Vanco 500mg post HD (last dose 11/14 after HD). Last day of tx is November 20th (14 days after catheter removal)  - CT A/P negative, CXR negative Afebrile, No SOB. Cultures from 11/7 negative x2 after 5 days. S/p Permacath removal on 11/6 and replacement on 11/9.   - c/w IV Vanco 500mg post HD (most recent dose 11/14 after HD). Last day of tx is November 20th (14 days after catheter removal) (vs. November 23rd-- will clarify with ID, whose recommendations are appreciated)  - CT A/P negative, CXR negative

## 2017-11-17 LAB
ANION GAP SERPL CALC-SCNC: 22 MMOL/L — HIGH (ref 5–17)
APTT BLD: 31.8 SEC — SIGNIFICANT CHANGE UP (ref 27.5–37.4)
APTT BLD: 39.5 SEC — HIGH (ref 27.5–37.4)
BUN SERPL-MCNC: 48 MG/DL — HIGH (ref 7–23)
CALCIUM SERPL-MCNC: 8.8 MG/DL — SIGNIFICANT CHANGE UP (ref 8.4–10.5)
CHLORIDE SERPL-SCNC: 95 MMOL/L — LOW (ref 96–108)
CO2 SERPL-SCNC: 20 MMOL/L — LOW (ref 22–31)
CREAT SERPL-MCNC: 5.51 MG/DL — HIGH (ref 0.5–1.3)
GLUCOSE BLDC GLUCOMTR-MCNC: 110 MG/DL — HIGH (ref 70–99)
GLUCOSE BLDC GLUCOMTR-MCNC: 132 MG/DL — HIGH (ref 70–99)
GLUCOSE BLDC GLUCOMTR-MCNC: 135 MG/DL — HIGH (ref 70–99)
GLUCOSE BLDC GLUCOMTR-MCNC: 175 MG/DL — HIGH (ref 70–99)
GLUCOSE SERPL-MCNC: 107 MG/DL — HIGH (ref 70–99)
HCT VFR BLD CALC: 28.4 % — LOW (ref 39–50)
HCT VFR BLD CALC: 29 % — LOW (ref 39–50)
HGB BLD-MCNC: 8.6 G/DL — LOW (ref 13–17)
HGB BLD-MCNC: 9.4 G/DL — LOW (ref 13–17)
INR BLD: 1.13 RATIO — SIGNIFICANT CHANGE UP (ref 0.88–1.16)
MCHC RBC-ENTMCNC: 27 PG — SIGNIFICANT CHANGE UP (ref 27–34)
MCHC RBC-ENTMCNC: 29.7 GM/DL — LOW (ref 32–36)
MCHC RBC-ENTMCNC: 30.1 PG — SIGNIFICANT CHANGE UP (ref 27–34)
MCHC RBC-ENTMCNC: 33.1 GM/DL — SIGNIFICANT CHANGE UP (ref 32–36)
MCV RBC AUTO: 90.9 FL — SIGNIFICANT CHANGE UP (ref 80–100)
MCV RBC AUTO: 91.1 FL — SIGNIFICANT CHANGE UP (ref 80–100)
PLATELET # BLD AUTO: 262 K/UL — SIGNIFICANT CHANGE UP (ref 150–400)
PLATELET # BLD AUTO: 288 K/UL — SIGNIFICANT CHANGE UP (ref 150–400)
POTASSIUM SERPL-MCNC: 4.8 MMOL/L — SIGNIFICANT CHANGE UP (ref 3.5–5.3)
POTASSIUM SERPL-SCNC: 4.8 MMOL/L — SIGNIFICANT CHANGE UP (ref 3.5–5.3)
PROTHROM AB SERPL-ACNC: 12.8 SEC — SIGNIFICANT CHANGE UP (ref 10–13.1)
RBC # BLD: 3.12 M/UL — LOW (ref 4.2–5.8)
RBC # BLD: 3.19 M/UL — LOW (ref 4.2–5.8)
RBC # FLD: 14.7 % — HIGH (ref 10.3–14.5)
RBC # FLD: 15 % — HIGH (ref 10.3–14.5)
SODIUM SERPL-SCNC: 137 MMOL/L — SIGNIFICANT CHANGE UP (ref 135–145)
WBC # BLD: 8.2 K/UL — SIGNIFICANT CHANGE UP (ref 3.8–10.5)
WBC # BLD: 8.83 K/UL — SIGNIFICANT CHANGE UP (ref 3.8–10.5)
WBC # FLD AUTO: 8.2 K/UL — SIGNIFICANT CHANGE UP (ref 3.8–10.5)
WBC # FLD AUTO: 8.83 K/UL — SIGNIFICANT CHANGE UP (ref 3.8–10.5)

## 2017-11-17 PROCEDURE — 99233 SBSQ HOSP IP/OBS HIGH 50: CPT | Mod: GC

## 2017-11-17 PROCEDURE — 99232 SBSQ HOSP IP/OBS MODERATE 35: CPT

## 2017-11-17 RX ORDER — HEPARIN SODIUM 5000 [USP'U]/ML
3000 INJECTION INTRAVENOUS; SUBCUTANEOUS EVERY 6 HOURS
Qty: 0 | Refills: 0 | Status: DISCONTINUED | OUTPATIENT
Start: 2017-11-17 | End: 2017-11-19

## 2017-11-17 RX ORDER — WARFARIN SODIUM 2.5 MG/1
3 TABLET ORAL ONCE
Qty: 0 | Refills: 0 | Status: COMPLETED | OUTPATIENT
Start: 2017-11-17 | End: 2017-11-17

## 2017-11-17 RX ORDER — HEPARIN SODIUM 5000 [USP'U]/ML
6500 INJECTION INTRAVENOUS; SUBCUTANEOUS EVERY 6 HOURS
Qty: 0 | Refills: 0 | Status: DISCONTINUED | OUTPATIENT
Start: 2017-11-17 | End: 2017-11-19

## 2017-11-17 RX ORDER — HEPARIN SODIUM 5000 [USP'U]/ML
700 INJECTION INTRAVENOUS; SUBCUTANEOUS
Qty: 25000 | Refills: 0 | Status: DISCONTINUED | OUTPATIENT
Start: 2017-11-17 | End: 2017-11-19

## 2017-11-17 RX ORDER — WARFARIN SODIUM 2.5 MG/1
3 TABLET ORAL ONCE
Qty: 0 | Refills: 0 | Status: DISCONTINUED | OUTPATIENT
Start: 2017-11-17 | End: 2017-11-17

## 2017-11-17 RX ORDER — VANCOMYCIN HCL 1 G
500 VIAL (EA) INTRAVENOUS ONCE
Qty: 0 | Refills: 0 | Status: COMPLETED | OUTPATIENT
Start: 2017-11-17 | End: 2017-11-17

## 2017-11-17 RX ADMIN — Medication 400 MILLIGRAM(S): at 04:51

## 2017-11-17 RX ADMIN — Medication 1: at 21:35

## 2017-11-17 RX ADMIN — WARFARIN SODIUM 3 MILLIGRAM(S): 2.5 TABLET ORAL at 21:36

## 2017-11-17 RX ADMIN — Medication 1 PUFF(S): at 13:02

## 2017-11-17 RX ADMIN — Medication 5 MILLIGRAM(S): at 05:09

## 2017-11-17 RX ADMIN — Medication 1 DROP(S): at 13:01

## 2017-11-17 RX ADMIN — TAMSULOSIN HYDROCHLORIDE 0.4 MILLIGRAM(S): 0.4 CAPSULE ORAL at 21:36

## 2017-11-17 RX ADMIN — Medication 1 DROP(S): at 05:01

## 2017-11-17 RX ADMIN — Medication 100 MILLIGRAM(S): at 12:58

## 2017-11-17 RX ADMIN — HEPARIN SODIUM 700 UNIT(S)/HR: 5000 INJECTION INTRAVENOUS; SUBCUTANEOUS at 15:05

## 2017-11-17 RX ADMIN — Medication 1000 MILLIGRAM(S): at 15:00

## 2017-11-17 RX ADMIN — HEPARIN SODIUM 3000 UNIT(S): 5000 INJECTION INTRAVENOUS; SUBCUTANEOUS at 22:23

## 2017-11-17 RX ADMIN — Medication 1 DROP(S): at 17:48

## 2017-11-17 RX ADMIN — PANTOPRAZOLE SODIUM 40 MILLIGRAM(S): 20 TABLET, DELAYED RELEASE ORAL at 05:01

## 2017-11-17 RX ADMIN — Medication 81 MILLIGRAM(S): at 13:01

## 2017-11-17 RX ADMIN — AMIODARONE HYDROCHLORIDE 200 MILLIGRAM(S): 400 TABLET ORAL at 05:02

## 2017-11-17 RX ADMIN — CLOTRIMAZOLE AND BETAMETHASONE DIPROPIONATE 1 APPLICATION(S): 10; .5 CREAM TOPICAL at 05:01

## 2017-11-17 RX ADMIN — Medication 500 MILLIGRAM(S): at 17:47

## 2017-11-17 RX ADMIN — ATORVASTATIN CALCIUM 40 MILLIGRAM(S): 80 TABLET, FILM COATED ORAL at 21:36

## 2017-11-17 RX ADMIN — Medication 1 APPLICATION(S): at 05:01

## 2017-11-17 RX ADMIN — HEPARIN SODIUM 900 UNIT(S)/HR: 5000 INJECTION INTRAVENOUS; SUBCUTANEOUS at 22:02

## 2017-11-17 RX ADMIN — Medication 500 MILLIGRAM(S): at 05:02

## 2017-11-17 RX ADMIN — CLOPIDOGREL BISULFATE 75 MILLIGRAM(S): 75 TABLET, FILM COATED ORAL at 13:01

## 2017-11-17 RX ADMIN — Medication 5 MILLIGRAM(S): at 17:47

## 2017-11-17 RX ADMIN — Medication 1 DROP(S): at 00:19

## 2017-11-17 RX ADMIN — CLOTRIMAZOLE AND BETAMETHASONE DIPROPIONATE 1 APPLICATION(S): 10; .5 CREAM TOPICAL at 17:48

## 2017-11-17 RX ADMIN — Medication 1 APPLICATION(S): at 17:48

## 2017-11-17 RX ADMIN — Medication 1000 MILLIGRAM(S): at 05:15

## 2017-11-17 RX ADMIN — Medication 400 MILLIGRAM(S): at 14:32

## 2017-11-17 RX ADMIN — ERYTHROPOIETIN 10000 UNIT(S): 10000 INJECTION, SOLUTION INTRAVENOUS; SUBCUTANEOUS at 10:57

## 2017-11-17 RX ADMIN — Medication 1 TABLET(S): at 13:03

## 2017-11-17 NOTE — PROGRESS NOTE ADULT - PROBLEM SELECTOR PLAN 1
Afebrile, No SOB. Cultures from 11/7 negative x2 after 5 days. S/p Permacath removal on 11/6, replacement on 11/9, and RUE AVF placement on 11/16.   - c/w IV Vanco 500mg post HD (most recent dose 11/14 after HD). Last day of tx is November 20th (14 days after catheter removal) (vs. November 23rd-- will clarify with ID, whose recommendations are appreciated)  - CT A/P negative, CXR negative Afebrile, No SOB. Cultures from 11/7 negative x2 after 5 days. S/p Permacath removal on 11/6, replacement on 11/9, and RUE AVF placement on 11/16.   - c/w IV Vanco 500mg post HD (most recent dose 11/14 after HD). Last day of tx is November 23rd (14 days after catheter removal)  - CT A/P negative, CXR negative

## 2017-11-17 NOTE — PROGRESS NOTE ADULT - SUBJECTIVE AND OBJECTIVE BOX
Patient is a 72y old  Male who presents with a chief complaint of s/p cardiac cath (24 Oct 2017 15:51)      SUBJECTIVE / OVERNIGHT EVENTS:  Patient seen and examined at bedside. RUE AVF placed by vascular surgery yesterday, patient tolerated procedure well. Unable to obtain ROS as patient non-verbal with housestaff.    MEDICATIONS  (STANDING):  acetaminophen  IVPB. 1000 milliGRAM(s) IV Intermittent once  amiodarone    Tablet 200 milliGRAM(s) Oral daily  artificial  tears Solution 1 Drop(s) Both EYES four times a day  ascorbic acid 500 milliGRAM(s) Oral two times a day  aspirin  chewable 81 milliGRAM(s) Oral daily  atorvastatin 40 milliGRAM(s) Oral at bedtime  BACItracin   Ointment 1 Application(s) Topical two times a day  busPIRone 5 milliGRAM(s) Oral two times a day  clopidogrel Tablet 75 milliGRAM(s) Oral daily  clotrimazole/betamethasone Cream 1 Application(s) Topical two times a day  epoetin georgie Injectable 00714 Unit(s) IV Push every other day  fluticasone propionate   110 MICROgram(s) HFA Inhaler 1 Puff(s) Inhalation daily  heparin  Injectable 5000 Unit(s) SubCutaneous every 12 hours  insulin lispro (HumaLOG) corrective regimen sliding scale   SubCutaneous Before meals and at bedtime  Nephro-sophie 1 Tablet(s) Oral daily  pantoprazole   Suspension 40 milliGRAM(s) Oral before breakfast  tamsulosin 0.4 milliGRAM(s) Oral at bedtime  vancomycin  IVPB 500 milliGRAM(s) IV Intermittent once    MEDICATIONS  (PRN):  ALBUTerol    90 MICROgram(s) HFA Inhaler 2 Puff(s) Inhalation every 6 hours PRN Shortness of Breath and/or Wheezing      Vital Signs Last 24 Hrs  T(C): 36.4 (17 Nov 2017 04:19), Max: 36.9 (16 Nov 2017 20:08)  T(F): 97.5 (17 Nov 2017 04:19), Max: 98.5 (16 Nov 2017 21:15)  HR: 89 (17 Nov 2017 04:19) (77 - 96)  BP: 135/79 (17 Nov 2017 04:19) (124/65 - 160/82)  BP(mean): 91 (16 Nov 2017 17:15) (87 - 96)  RR: 18 (17 Nov 2017 04:19) (14 - 18)  SpO2: 99% (17 Nov 2017 04:19) (93% - 100%)  CAPILLARY BLOOD GLUCOSE      POCT Blood Glucose.: 171 mg/dL (16 Nov 2017 21:56)  POCT Blood Glucose.: 129 mg/dL (16 Nov 2017 12:25)  POCT Blood Glucose.: 125 mg/dL (16 Nov 2017 08:40)    I&O's Summary    15 Nov 2017 07:01  -  16 Nov 2017 07:00  --------------------------------------------------------  IN: 549 mL / OUT: 0 mL / NET: 549 mL    16 Nov 2017 07:01  -  17 Nov 2017 05:55  --------------------------------------------------------  IN: 60 mL / OUT: 0 mL / NET: 60 mL        PHYSICAL EXAM:  GENERAL: NAD, well-developed  HEAD:  Atraumatic, Normocephalic  EYES: conjunctiva and sclera clear  NECK: Supple, No JVD  CHEST/LUNG: Clear to auscultation bilaterally; No wheeze  HEART: Regular rate and rhythm; no murmurs, rubs, or gallops  ABDOMEN: Soft, Nontender, Nondistended; Bowel sounds present  EXTREMITIES:  2+ Peripheral Pulses, No clubbing, cyanosis, or edema; RUE AVF w/ good thrill and bruit  PSYCH: awake and alert, not following commands  SKIN: sacral decubitus stage 3 ulcer present, dressing overlying permacath site CDI	    LABS:                        9.4    7.38  )-----------( 257      ( 16 Nov 2017 08:00 )             30.8     WBC Trend: 7.38<--, 8.07<--, 7.89<--  11-16    140  |  99  |  36<H>  ----------------------------<  121<H>  4.4   |  21<L>  |  4.11<H>    Ca    9.9      16 Nov 2017 08:00  Phos  3.1     11-16  Mg     2.0     11-16      Creatinine Trend: 4.11<--, 5.03<--, 3.66<--, 5.70<--, 4.29<--, 3.77<--  PT/INR - ( 16 Nov 2017 08:00 )   PT: 13.2 sec;   INR: 1.16 ratio         PTT - ( 16 Nov 2017 08:00 )  PTT:30.8 sec            RADIOLOGY & ADDITIONAL TESTS:    Imaging Personally Reviewed:    Consultant(s) Notes Reviewed: Infectious Disease, Nephrology, Surgery     Care Discussed with Consultants/Other Providers:    CONTACT #: [id number] Patient is a 72y old  Male who presents with a chief complaint of s/p cardiac cath (24 Oct 2017 15:51)      SUBJECTIVE / OVERNIGHT EVENTS:  Patient seen and examined at bedside. RUE AVF placed by vascular surgery yesterday, patient tolerated procedure well. Unable to obtain ROS as patient non-verbal with housestaff.    MEDICATIONS  (STANDING):  acetaminophen  IVPB. 1000 milliGRAM(s) IV Intermittent once  amiodarone    Tablet 200 milliGRAM(s) Oral daily  artificial  tears Solution 1 Drop(s) Both EYES four times a day  ascorbic acid 500 milliGRAM(s) Oral two times a day  aspirin  chewable 81 milliGRAM(s) Oral daily  atorvastatin 40 milliGRAM(s) Oral at bedtime  BACItracin   Ointment 1 Application(s) Topical two times a day  busPIRone 5 milliGRAM(s) Oral two times a day  clopidogrel Tablet 75 milliGRAM(s) Oral daily  clotrimazole/betamethasone Cream 1 Application(s) Topical two times a day  epoetin georgie Injectable 06994 Unit(s) IV Push every other day  fluticasone propionate   110 MICROgram(s) HFA Inhaler 1 Puff(s) Inhalation daily  heparin  Injectable 5000 Unit(s) SubCutaneous every 12 hours  insulin lispro (HumaLOG) corrective regimen sliding scale   SubCutaneous Before meals and at bedtime  Nephro-sophie 1 Tablet(s) Oral daily  pantoprazole   Suspension 40 milliGRAM(s) Oral before breakfast  tamsulosin 0.4 milliGRAM(s) Oral at bedtime  vancomycin  IVPB 500 milliGRAM(s) IV Intermittent once    MEDICATIONS  (PRN):  ALBUTerol    90 MICROgram(s) HFA Inhaler 2 Puff(s) Inhalation every 6 hours PRN Shortness of Breath and/or Wheezing      Vital Signs Last 24 Hrs  T(C): 36.4 (17 Nov 2017 04:19), Max: 36.9 (16 Nov 2017 20:08)  T(F): 97.5 (17 Nov 2017 04:19), Max: 98.5 (16 Nov 2017 21:15)  HR: 89 (17 Nov 2017 04:19) (77 - 96)  BP: 135/79 (17 Nov 2017 04:19) (124/65 - 160/82)  BP(mean): 91 (16 Nov 2017 17:15) (87 - 96)  RR: 18 (17 Nov 2017 04:19) (14 - 18)  SpO2: 99% (17 Nov 2017 04:19) (93% - 100%)  CAPILLARY BLOOD GLUCOSE    POCT  Blood Glucose (11.17.17 @ 07:58)    POCT Blood Glucose.: 132: NotifiedMDClndMtr mg/dL  POCT Blood Glucose.: 171 mg/dL (16 Nov 2017 21:56)  POCT Blood Glucose.: 129 mg/dL (16 Nov 2017 12:25)  POCT Blood Glucose.: 125 mg/dL (16 Nov 2017 08:40)    I&O's Summary    15 Nov 2017 07:01  -  16 Nov 2017 07:00  --------------------------------------------------------  IN: 549 mL / OUT: 0 mL / NET: 549 mL    16 Nov 2017 07:01  -  17 Nov 2017 05:55  --------------------------------------------------------  IN: 60 mL / OUT: 0 mL / NET: 60 mL        PHYSICAL EXAM:  GENERAL: NAD, well-developed  HEAD:  Atraumatic, Normocephalic  EYES: conjunctiva and sclera clear  NECK: Supple, No JVD  CHEST/LUNG: Clear to auscultation bilaterally; No wheeze  HEART: Regular rate and rhythm; no murmurs, rubs, or gallops  ABDOMEN: Soft, Nontender, Nondistended; Bowel sounds present  EXTREMITIES:  2+ Peripheral Pulses, No clubbing, cyanosis, or edema; RUE wrapped in ace bandage  PSYCH: awake and alert, not following commands  SKIN: sacral decubitus stage 3 ulcer present, dressing overlying permacath site CDI	    LABS:                        9.4    7.38  )-----------( 257      ( 16 Nov 2017 08:00 )             30.8     WBC Trend: 7.38<--, 8.07<--, 7.89<--  11-16    140  |  99  |  36<H>  ----------------------------<  121<H>  4.4   |  21<L>  |  4.11<H>    Ca    9.9      16 Nov 2017 08:00  Phos  3.1     11-16  Mg     2.0     11-16      Creatinine Trend: 4.11<--, 5.03<--, 3.66<--, 5.70<--, 4.29<--, 3.77<--  Prothrombin Time and INR, Plasma (11.17.17 @ 08:05)    Prothrombin Time, Plasma: 12.8 sec    INR: 1.13: Recommended ranges for therapeutic INR:    2.0-3.0 for most medical and surgical thromboembolic states    2.0-3.0 for atrial fibrillation    2.0-3.0 for bileaflet mechanical valve in aortic position    2.5-3.5 for mechanical heart valves    Chest 2004;126:[id number]  The presence of direct thrombin inhibitors (argatroban, refludan)  may falsely increase results. ratio    Activated Partial Thromboplastin Time (11.17.17 @ 08:05)    Activated Partial Thromboplastin Time: 31.8: The recommended therapeutic heparin range(full dose) is 58-99 seconds.  Recommended therapeutic Argatroban range is 1.5 to 3.0 times the baseline  APTT value, not to exceed 100 seconds. Recommended therapeutic Refludan  range is 1.5 to 2.5 times the baseline APTT. sec    RADIOLOGY & ADDITIONAL TESTS:    Imaging Personally Reviewed:    Consultant(s) Notes Reviewed: Infectious Disease, Nephrology, Surgery, Vascular Surgery     Care Discussed with Consultants/Other Providers:    CONTACT #: [id number] Patient is a 72y old  Male who presents with a chief complaint of s/p cardiac cath (24 Oct 2017 15:51)      SUBJECTIVE / OVERNIGHT EVENTS:  Patient seen and examined at bedside. RUE AVF placed by vascular surgery yesterday, patient tolerated procedure well. Unable to obtain ROS as patient non-verbal with housestaff.    MEDICATIONS  (STANDING):  acetaminophen  IVPB. 1000 milliGRAM(s) IV Intermittent once  amiodarone    Tablet 200 milliGRAM(s) Oral daily  artificial  tears Solution 1 Drop(s) Both EYES four times a day  ascorbic acid 500 milliGRAM(s) Oral two times a day  aspirin  chewable 81 milliGRAM(s) Oral daily  atorvastatin 40 milliGRAM(s) Oral at bedtime  BACItracin   Ointment 1 Application(s) Topical two times a day  busPIRone 5 milliGRAM(s) Oral two times a day  clopidogrel Tablet 75 milliGRAM(s) Oral daily  clotrimazole/betamethasone Cream 1 Application(s) Topical two times a day  epoetin georgie Injectable 37103 Unit(s) IV Push every other day  fluticasone propionate   110 MICROgram(s) HFA Inhaler 1 Puff(s) Inhalation daily  heparin  Injectable 5000 Unit(s) SubCutaneous every 12 hours  insulin lispro (HumaLOG) corrective regimen sliding scale   SubCutaneous Before meals and at bedtime  Nephro-sophie 1 Tablet(s) Oral daily  pantoprazole   Suspension 40 milliGRAM(s) Oral before breakfast  tamsulosin 0.4 milliGRAM(s) Oral at bedtime  vancomycin  IVPB 500 milliGRAM(s) IV Intermittent once    MEDICATIONS  (PRN):  ALBUTerol    90 MICROgram(s) HFA Inhaler 2 Puff(s) Inhalation every 6 hours PRN Shortness of Breath and/or Wheezing      Vital Signs Last 24 Hrs  T(C): 36.4 (17 Nov 2017 04:19), Max: 36.9 (16 Nov 2017 20:08)  T(F): 97.5 (17 Nov 2017 04:19), Max: 98.5 (16 Nov 2017 21:15)  HR: 89 (17 Nov 2017 04:19) (77 - 96)  BP: 135/79 (17 Nov 2017 04:19) (124/65 - 160/82)  BP(mean): 91 (16 Nov 2017 17:15) (87 - 96)  RR: 18 (17 Nov 2017 04:19) (14 - 18)  SpO2: 99% (17 Nov 2017 04:19) (93% - 100%)  CAPILLARY BLOOD GLUCOSE    POCT  Blood Glucose (11.17.17 @ 07:58)    POCT Blood Glucose.: 132: NotifiedMDClndMtr mg/dL  POCT Blood Glucose.: 171 mg/dL (16 Nov 2017 21:56)  POCT Blood Glucose.: 129 mg/dL (16 Nov 2017 12:25)  POCT Blood Glucose.: 125 mg/dL (16 Nov 2017 08:40)    I&O's Summary    15 Nov 2017 07:01  -  16 Nov 2017 07:00  --------------------------------------------------------  IN: 549 mL / OUT: 0 mL / NET: 549 mL    16 Nov 2017 07:01  -  17 Nov 2017 05:55  --------------------------------------------------------  IN: 60 mL / OUT: 0 mL / NET: 60 mL        PHYSICAL EXAM:  GENERAL: NAD, well-developed  HEAD:  Atraumatic, Normocephalic  EYES: conjunctiva and sclera clear  NECK: Supple, No JVD  CHEST/LUNG: Clear to auscultation bilaterally; No wheeze  HEART: Regular rate and rhythm; no murmurs, rubs, or gallops  ABDOMEN: Soft, Nontender, Nondistended; Bowel sounds present  EXTREMITIES:  2+ Peripheral Pulses, No clubbing, cyanosis, or edema; RUE wrapped in ace bandage  PSYCH: awake and alert, not following commands  SKIN: sacral decubitus stage 3 ulcer present, dressing overlying permacath site CDI	    LABS:                        9.4    7.38  )-----------( 257      ( 16 Nov 2017 08:00 )             30.8     WBC Trend: 7.38<--, 8.07<--, 7.89<--  11-17    137  |  95<L>  |  48<H>  ----------------------------<  107<H>  4.8   |  20<L>  |  5.51<H>    Ca    8.8      17 Nov 2017 08:09  Phos  3.1     11-16  Mg     2.0     11-16    Creatinine Trend: 5.51<--, 4.11<--, 5.03<--, 3.66<--, 5.70<--, 4.29<--, 3.77<--    Prothrombin Time and INR, Plasma (11.17.17 @ 08:05)    Prothrombin Time, Plasma: 12.8 sec    INR: 1.13: Recommended ranges for therapeutic INR:    2.0-3.0 for most medical and surgical thromboembolic states    2.0-3.0 for atrial fibrillation    2.0-3.0 for bileaflet mechanical valve in aortic position    2.5-3.5 for mechanical heart valves    Chest 2004;126:[id number]  The presence of direct thrombin inhibitors (argatroban, refludan)  may falsely increase results. ratio    Activated Partial Thromboplastin Time (11.17.17 @ 08:05)    Activated Partial Thromboplastin Time: 31.8: The recommended therapeutic heparin range(full dose) is 58-99 seconds.  Recommended therapeutic Argatroban range is 1.5 to 3.0 times the baseline  APTT value, not to exceed 100 seconds. Recommended therapeutic Refludan  range is 1.5 to 2.5 times the baseline APTT. sec    RADIOLOGY & ADDITIONAL TESTS:    Imaging Personally Reviewed:    Consultant(s) Notes Reviewed: Infectious Disease, Nephrology, Surgery, Vascular Surgery     Care Discussed with Consultants/Other Providers:    CONTACT #: [id number] Patient is a 72y old  Male who presents with a chief complaint of s/p cardiac cath (24 Oct 2017 15:51)    SUBJECTIVE / OVERNIGHT EVENTS:  Patient seen and examined at bedside. RUE AVF placed by vascular surgery yesterday, patient tolerated procedure well. Unable to obtain ROS as patient non-verbal with housestaff.    MEDICATIONS  (STANDING):  acetaminophen  IVPB. 1000 milliGRAM(s) IV Intermittent once  amiodarone    Tablet 200 milliGRAM(s) Oral daily  artificial  tears Solution 1 Drop(s) Both EYES four times a day  ascorbic acid 500 milliGRAM(s) Oral two times a day  aspirin  chewable 81 milliGRAM(s) Oral daily  atorvastatin 40 milliGRAM(s) Oral at bedtime  BACItracin   Ointment 1 Application(s) Topical two times a day  busPIRone 5 milliGRAM(s) Oral two times a day  clopidogrel Tablet 75 milliGRAM(s) Oral daily  clotrimazole/betamethasone Cream 1 Application(s) Topical two times a day  epoetin georgie Injectable 99023 Unit(s) IV Push every other day  fluticasone propionate   110 MICROgram(s) HFA Inhaler 1 Puff(s) Inhalation daily  heparin  Injectable 5000 Unit(s) SubCutaneous every 12 hours  insulin lispro (HumaLOG) corrective regimen sliding scale   SubCutaneous Before meals and at bedtime  Nephro-sophie 1 Tablet(s) Oral daily  pantoprazole   Suspension 40 milliGRAM(s) Oral before breakfast  tamsulosin 0.4 milliGRAM(s) Oral at bedtime  vancomycin  IVPB 500 milliGRAM(s) IV Intermittent once    MEDICATIONS  (PRN):  ALBUTerol    90 MICROgram(s) HFA Inhaler 2 Puff(s) Inhalation every 6 hours PRN Shortness of Breath and/or Wheezing    Vital Signs Last 24 Hrs  T(C): 36.4 (17 Nov 2017 04:19), Max: 36.9 (16 Nov 2017 20:08)  T(F): 97.5 (17 Nov 2017 04:19), Max: 98.5 (16 Nov 2017 21:15)  HR: 89 (17 Nov 2017 04:19) (77 - 96)  BP: 135/79 (17 Nov 2017 04:19) (124/65 - 160/82)  BP(mean): 91 (16 Nov 2017 17:15) (87 - 96)  RR: 18 (17 Nov 2017 04:19) (14 - 18)  SpO2: 99% (17 Nov 2017 04:19) (93% - 100%)    CAPILLARY BLOOD GLUCOSE  POCT  Blood Glucose (11.17.17 @ 07:58)  POCT Blood Glucose.: 132: NotifiedMDClndMtr mg/dL  POCT Blood Glucose.: 171 mg/dL (16 Nov 2017 21:56)  POCT Blood Glucose.: 129 mg/dL (16 Nov 2017 12:25)  POCT Blood Glucose.: 125 mg/dL (16 Nov 2017 08:40)    I&O's Summary    15 Nov 2017 07:01  -  16 Nov 2017 07:00  --------------------------------------------------------  IN: 549 mL / OUT: 0 mL / NET: 549 mL    16 Nov 2017 07:01  -  17 Nov 2017 05:55  --------------------------------------------------------  IN: 60 mL / OUT: 0 mL / NET: 60 mL    PHYSICAL EXAM:  GENERAL: NAD, well-developed  HEAD:  Atraumatic, Normocephalic  EYES: conjunctiva and sclera clear  NECK: Supple, No JVD  CHEST/LUNG: Clear to auscultation bilaterally; No wheeze  HEART: Regular rate and rhythm; no murmurs, rubs, or gallops  ABDOMEN: Soft, Nontender, Nondistended; Bowel sounds present  EXTREMITIES:  2+ Peripheral Pulses, No clubbing, cyanosis, or edema; RUE wrapped in ace bandage  PSYCH: awake and alert, not following commands  SKIN: sacral decubitus stage 3 ulcer present, dressing overlying permacath site CDI	    LABS:                        9.4    7.38  )-----------( 257      ( 16 Nov 2017 08:00 )             30.8     WBC Trend: 7.38<--, 8.07<--, 7.89<--  11-17    137  |  95<L>  |  48<H>  ----------------------------<  107<H>  4.8   |  20<L>  |  5.51<H>    Ca    8.8      17 Nov 2017 08:09  Phos  3.1     11-16  Mg     2.0     11-16    Creatinine Trend: 5.51<--, 4.11<--, 5.03<--, 3.66<--, 5.70<--, 4.29<--, 3.77<--    Prothrombin Time and INR, Plasma (11.17.17 @ 08:05)    Prothrombin Time, Plasma: 12.8 sec    INR: 1.13: Recommended ranges for therapeutic INR:    2.0-3.0 for most medical and surgical thromboembolic states    2.0-3.0 for atrial fibrillation    2.0-3.0 for bileaflet mechanical valve in aortic position    2.5-3.5 for mechanical heart valves    Chest 2004;126:[id number]  The presence of direct thrombin inhibitors (argatroban, refludan)  may falsely increase results. ratio    Activated Partial Thromboplastin Time (11.17.17 @ 08:05)    Activated Partial Thromboplastin Time: 31.8: The recommended therapeutic heparin range(full dose) is 58-99 seconds.  Recommended therapeutic Argatroban range is 1.5 to 3.0 times the baseline  APTT value, not to exceed 100 seconds. Recommended therapeutic Refludan  range is 1.5 to 2.5 times the baseline APTT. sec    RADIOLOGY & ADDITIONAL TESTS:    Consultant(s) Notes Reviewed: Infectious Disease, Nephrology, Surgery, Vascular Surgery     CONTACT #: [id number] Patient is a 72y old  Male who presents with a chief complaint of s/p cardiac cath (24 Oct 2017 15:51)    SUBJECTIVE / OVERNIGHT EVENTS:  Patient seen and examined at bedside. RUE AVF placed by vascular surgery yesterday, patient tolerated procedure well. Unable to obtain ROS as patient non-verbal with housestaff.    MEDICATIONS  (STANDING):  acetaminophen  IVPB. 1000 milliGRAM(s) IV Intermittent once  amiodarone    Tablet 200 milliGRAM(s) Oral daily  artificial  tears Solution 1 Drop(s) Both EYES four times a day  ascorbic acid 500 milliGRAM(s) Oral two times a day  aspirin  chewable 81 milliGRAM(s) Oral daily  atorvastatin 40 milliGRAM(s) Oral at bedtime  BACItracin   Ointment 1 Application(s) Topical two times a day  busPIRone 5 milliGRAM(s) Oral two times a day  clopidogrel Tablet 75 milliGRAM(s) Oral daily  clotrimazole/betamethasone Cream 1 Application(s) Topical two times a day  epoetin georgie Injectable 33469 Unit(s) IV Push every other day  fluticasone propionate   110 MICROgram(s) HFA Inhaler 1 Puff(s) Inhalation daily  heparin  Injectable 5000 Unit(s) SubCutaneous every 12 hours  insulin lispro (HumaLOG) corrective regimen sliding scale   SubCutaneous Before meals and at bedtime  Nephro-sophie 1 Tablet(s) Oral daily  pantoprazole   Suspension 40 milliGRAM(s) Oral before breakfast  tamsulosin 0.4 milliGRAM(s) Oral at bedtime  vancomycin  IVPB 500 milliGRAM(s) IV Intermittent once    MEDICATIONS  (PRN):  ALBUTerol    90 MICROgram(s) HFA Inhaler 2 Puff(s) Inhalation every 6 hours PRN Shortness of Breath and/or Wheezing    Vital Signs Last 24 Hrs  T(C): 36.4 (17 Nov 2017 04:19), Max: 36.9 (16 Nov 2017 20:08)  T(F): 97.5 (17 Nov 2017 04:19), Max: 98.5 (16 Nov 2017 21:15)  HR: 89 (17 Nov 2017 04:19) (77 - 96)  BP: 135/79 (17 Nov 2017 04:19) (124/65 - 160/82)  BP(mean): 91 (16 Nov 2017 17:15) (87 - 96)  RR: 18 (17 Nov 2017 04:19) (14 - 18)  SpO2: 99% (17 Nov 2017 04:19) (93% - 100%)    CAPILLARY BLOOD GLUCOSE  POCT  Blood Glucose (11.17.17 @ 07:58)  POCT Blood Glucose.: 132: NotifiedMDClndMtr mg/dL  POCT Blood Glucose.: 171 mg/dL (16 Nov 2017 21:56)  POCT Blood Glucose.: 129 mg/dL (16 Nov 2017 12:25)  POCT Blood Glucose.: 125 mg/dL (16 Nov 2017 08:40)    I&O's Summary    15 Nov 2017 07:01  -  16 Nov 2017 07:00  --------------------------------------------------------  IN: 549 mL / OUT: 0 mL / NET: 549 mL    16 Nov 2017 07:01  -  17 Nov 2017 05:55  --------------------------------------------------------  IN: 60 mL / OUT: 0 mL / NET: 60 mL    PHYSICAL EXAM:  GENERAL: NAD, well-developed  HEAD:  Atraumatic, Normocephalic  EYES: conjunctiva and sclera clear  NECK: Supple, No JVD  CHEST/LUNG: Clear to auscultation bilaterally; No wheeze  HEART: Regular rate and rhythm; no murmurs, rubs, or gallops  ABDOMEN: Soft, Nontender, Nondistended; Bowel sounds present  EXTREMITIES:  2+ Peripheral Pulses, No clubbing, cyanosis, or edema; RUE wrapped in ace bandage  PSYCH: awake and alert, not following commands  SKIN: sacral decubitus stage 3 ulcer present, dressing overlying permacath site CDI	    LABS:                                   8.6    8.83  )-----------( 288      ( 17 Nov 2017 08:05 )             29.0     WBC Trend: 8.83<--, 7.38<--, 8.07<--, 7.89<--  11-17    137  |  95<L>  |  48<H>  ----------------------------<  107<H>  4.8   |  20<L>  |  5.51<H>    Ca    8.8      17 Nov 2017 08:09  Phos  3.1     11-16  Mg     2.0     11-16    Creatinine Trend: 5.51<--, 4.11<--, 5.03<--, 3.66<--, 5.70<--, 4.29<--, 3.77<--    Prothrombin Time and INR, Plasma (11.17.17 @ 08:05)    Prothrombin Time, Plasma: 12.8 sec    INR: 1.13: Recommended ranges for therapeutic INR:    2.0-3.0 for most medical and surgical thromboembolic states    2.0-3.0 for atrial fibrillation    2.0-3.0 for bileaflet mechanical valve in aortic position    2.5-3.5 for mechanical heart valves    Chest 2004;126:[id number]  The presence of direct thrombin inhibitors (argatroban, refludan)  may falsely increase results. ratio    Activated Partial Thromboplastin Time (11.17.17 @ 08:05)    Activated Partial Thromboplastin Time: 31.8: The recommended therapeutic heparin range(full dose) is 58-99 seconds.  Recommended therapeutic Argatroban range is 1.5 to 3.0 times the baseline  APTT value, not to exceed 100 seconds. Recommended therapeutic Refludan  range is 1.5 to 2.5 times the baseline APTT. sec    RADIOLOGY & ADDITIONAL TESTS:    Consultant(s) Notes Reviewed: Infectious Disease, Nephrology, Surgery, Vascular Surgery     CONTACT #: [id number]

## 2017-11-17 NOTE — PROGRESS NOTE ADULT - SUBJECTIVE AND OBJECTIVE BOX
Patient is a 72y old  Male who presents with a chief complaint of s/p cardiac cath (24 Oct 2017 15:51)    Being followed by ID for bacteremia    Interval history:s/p HD   No acute events      ROS:  Not obtainable     Antimicrobials:Vanco post HD       Other medications reviewed    Vital Signs Last 24 Hrs  T(C): 36.7 (11-17-17 @ 12:56), Max: 36.9 (11-16-17 @ 20:08)  T(F): 98.1 (11-17-17 @ 12:56), Max: 98.5 (11-16-17 @ 21:15)  HR: 97 (11-17-17 @ 12:56) (77 - 99)  BP: 124/77 (11-17-17 @ 12:56) (105/66 - 160/82)  BP(mean): 91 (11-16-17 @ 17:15) (87 - 96)  RR: 18 (11-17-17 @ 12:56) (14 - 18)  SpO2: 97% (11-17-17 @ 12:56) (93% - 100%)    Physical Exam:    Constitutional well preserved,comfortable,pleasant    HEENT PERRLA EOMI,No pallor or icterus    No oral exudate or erythema    Neck supple no JVD or LN  R SC HD catheter no discharge     Chest Good AE,CTA    CVS RRR S1 S2 WNl No murmur or rub or gallop    Abd soft BS normal No tenderness no masses    Ext R arm AVF no erythema or tenderness    IV site no erythema tenderness or discharge    Joints no swelling or LOM    CNS AAO X 3 no focal    Lab Data:                          8.6    8.83  )-----------( 288      ( 17 Nov 2017 08:05 )             29.0       11-17    137  |  95<L>  |  48<H>  ----------------------------<  107<H>  4.8   |  20<L>  |  5.51<H>    Ca    8.8      17 Nov 2017 08:09  Phos  3.1     11-16  Mg     2.0     11-16

## 2017-11-17 NOTE — PROGRESS NOTE ADULT - PROBLEM SELECTOR PLAN 9
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 Has been stable  - c/w ASA, Plavix, statin  - coumadin was held for AVF placement, will resume heparin gtt and bridge back to coumadin

## 2017-11-17 NOTE — PROGRESS NOTE ADULT - PROBLEM SELECTOR PLAN 10
DVT PPX: coumadin was held for AVF placement, will f/u vascular sx recommendations regarding when to resume heparin gtt and ultimately bridge back to coumadin    Dispo: PT recommends d/c to [facility] DVT PPX: coumadin was held for AVF placement, will resume heparin gtt and bridge back to coumadin    Dispo: PT recommends d/c to [facility]

## 2017-11-17 NOTE — PROGRESS NOTE ADULT - ASSESSMENT
71 yo with DM,ESRD recent HD,CVA ,non verbal.Recent bacteremia,pyelo s/p antimicrobials  CNS bacteremia-?HD cath related  s/p replacement  Repeat Cx negative  Continue IV vanco till 11/23-plan as detailed earlier  Other plan per primary  Infectious Diseases Service will cover over weekend.  Please call [phone] if issues

## 2017-11-17 NOTE — PROGRESS NOTE ADULT - SUBJECTIVE AND OBJECTIVE BOX
Vascular Surgery Progress Note  [id number]      24hr Events:  -s/p RUE AV graft placement 11/16/17      HPI:  72M PMHx CAD s/p 12-14 stents ([id number]), DM. HTN, HLD, w/ residual R sided facial weakness, radiculopathy with herniated disc, COPD, CKD Cr 2 months ago 1.2 on dialysis MWF, bipolar (no meds followed by psychiatry), chronic kidney stones and has had urethral tents in the past presents from rehab facility for episode of hypotension to SBP 90s while at dialysis today.   Pt was discharged 10/18 after long hospital stay after initially presenting with NSTEMI/pulmonary edema and transferred to [facility] 9/12 for LHC + BABAR x3 + ARF on CKD +HD, + UTI on CTX.  Pt declined HD at one point, rising K, did undergo HD and later afterwards + unresponsive, CODE BLUE x 20 min with ROSC c/b VT s/p shock/amio load c/b CVA (r.weakness + embolic CVAs on CT), + new AF + heparin gtt c/b groin hematoma s/p txn, new CHF EF 35%, + palliative care discussions + dnr/dni.  Planned to cont HD via new r.permcath.  dysphagia treated with dysphagia 1 diet with honey liquids, PPM declined by family and sent to rehab.  Pt sbps low 100s to 110s.  on discharge trop 7.64    Pt needs AV Fistula for placement in HD centers. Vascular surgery was consulted in this context. The patient is minimally verbal, but Dr. Saez saw him with his brother at the bedside.       S:  -patient resting comfortably.      O:  Vital Signs Last 24 Hrs  T(C): 36.4 (17 Nov 2017 04:19), Max: 36.9 (16 Nov 2017 20:08)  T(F): 97.5 (17 Nov 2017 04:19), Max: 98.5 (16 Nov 2017 21:15)  HR: 89 (17 Nov 2017 04:19) (77 - 96)  BP: 135/79 (17 Nov 2017 04:19) (124/65 - 160/82)  BP(mean): 91 (16 Nov 2017 17:15) (87 - 96)  RR: 18 (17 Nov 2017 04:19) (14 - 18)  SpO2: 99% (17 Nov 2017 04:19) (93% - 100%)      I&O's Detail    15 Nov 2017 07:01  -  16 Nov 2017 07:00  --------------------------------------------------------  IN:    heparin  Infusion.: 89 mL    Oral Fluid: 360 mL    Solution: 100 mL  Total IN: 549 mL    OUT:  Total OUT: 0 mL    Total NET: 549 mL      16 Nov 2017 07:01  -  17 Nov 2017 06:37  --------------------------------------------------------  IN:    Oral Fluid: 80 mL    Solution: 100 mL  Total IN: 180 mL    OUT:  Total OUT: 0 mL    Total NET: 180 mL        PE:  Gen: NAD; nonverbal, doesn't follow commands  Neuro: Residual Right sided weakness  Pulm/chest: breathing comfortably on RA; R chest tunneled dual lumen catheter w/ clear gauze dressing covered by clear dressing.   Cor: regular  Ext: hands warm, well perfused, <2sec capillary refill b/l; R ACE wrap & wrist splint in place; RUE incision clean, no bleeding/drainage, ecchymosis, palpable thrill        MEDICATIONS  (STANDING):  acetaminophen  IVPB. 1000 milliGRAM(s) IV Intermittent once  amiodarone    Tablet 200 milliGRAM(s) Oral daily  artificial  tears Solution 1 Drop(s) Both EYES four times a day  ascorbic acid 500 milliGRAM(s) Oral two times a day  aspirin  chewable 81 milliGRAM(s) Oral daily  atorvastatin 40 milliGRAM(s) Oral at bedtime  BACItracin   Ointment 1 Application(s) Topical two times a day  busPIRone 5 milliGRAM(s) Oral two times a day  clopidogrel Tablet 75 milliGRAM(s) Oral daily  clotrimazole/betamethasone Cream 1 Application(s) Topical two times a day  epoetin georgie Injectable 81142 Unit(s) IV Push every other day  fluticasone propionate   110 MICROgram(s) HFA Inhaler 1 Puff(s) Inhalation daily  heparin  Injectable 5000 Unit(s) SubCutaneous every 12 hours  insulin lispro (HumaLOG) corrective regimen sliding scale   SubCutaneous Before meals and at bedtime  Nephro-sophie 1 Tablet(s) Oral daily  pantoprazole   Suspension 40 milliGRAM(s) Oral before breakfast  tamsulosin 0.4 milliGRAM(s) Oral at bedtime  vancomycin  IVPB 500 milliGRAM(s) IV Intermittent once    MEDICATIONS  (PRN):  ALBUTerol    90 MICROgram(s) HFA Inhaler 2 Puff(s) Inhalation every 6 hours PRN Shortness of Breath and/or Wheezing      Labs:                             9.4    7.38  )-----------( 257      ( 16 Nov 2017 08:00 )             30.8                      11.7   7.1   )-----------( 319      ( 10 Nov 2017 06:46 )             37.2     PT/INR - ( 16 Nov 2017 08:00 )   PT: 13.2 sec;   INR: 1.16 ratio    PTT - ( 16 Nov 2017 08:00 )  PTT:30.8 sec    PT/INR - ( 10 Nov 2017 06:48 )   PT: 15.9 sec;   INR: 1.46 ratio    PTT - ( 10 Nov 2017 06:48 )  PTT:32.2 sec    11-16    140  |  99  |  36<H>  ----------------------------<  121<H>  4.4   |  21<L>  |  4.11<H>    Ca    9.9      16 Nov 2017 08:00  Phos  3.1     11-16  Mg     2.0     11-16 11-10    139  |  96  |  35<H>  ----------------------------<  103<H>  3.9   |  25  |  3.77<H>    Ca    9.9      10 Nov 2017 06:46    TPro  7.9  /  Alb  4.3  /  TBili  0.5  /  DBili  x   /  AST  28  /  ALT  30  /  AlkPhos  125<H>  11-10      Hemoglobin A1C, Whole Blood (09.13.17 @ 10:32)    Hemoglobin A1C, Whole Blood: 6.5: Method: Immunoassay      Vancomycin Level, Random (11.09.17 @ 06:46)    Vancomycin Level, Random: 17.7: The "VANCOMYCIN, RANDOM" test should only be ordered for patients with        Microbiology:  Culture - Blood (10.21.17 @ 00:35)    Gram Stain:   Growth in aerobic bottle: Gram Variable Rods    -  Multidrug (KPC pos) resistant organism: Nondet    -  Staphylococcus aureus: Nondet    -  Methicillin resistant Staphylococcus aureus (MRSA): Nondet    -  Coagulase negative Staphylococcus: Nondet    -  Enterococcus species: Nondet    -  Vancomycin resistant Enterococcus sp.: Nondet    -  Escherichia coli: Nondet    -  Klebsiella oxytoca: Nondet    -  Klebsiella pneumoniae: Nondet    -  Serratia marcescens: Nondet    -  Proteus species: Nondet    -  Haemophilus influenzae: Nondet    -  Listeria monocytogenes: Nondet    -  Neisseria meningitidis: Nondet    -  Pseudomonas aeruginosa: Nondet    -  Acinetobacter baumanii: Nondet    -  Enterobacter cloacae complex: Nondet    -  Streptococcus sp. (Not Grp A, B or S pneumoniae): Nondet    -  Streptococcus agalactiae (Group B): Nondet    -  Streptococcus pyogenes (Group A): Nondet    -  Streptococcus pneumoniae: Nondet    -  Candida albicans: Nondet    -  Candida glabrata: Nondet    -  Candida krusei: Nondet    -  Candida parapsilosis: Nondet    -  Candida tropicalis: Nondet    Specimen Source: .Blood Blood-Peripheral    Organism: Blood Culture PCR    Culture Results:   Growth in aerobic bottle: Bacillus species not anthracis      Culture - Blood (10.21.17 @ 00:35)    Gram Stain:   Growth in aerobic bottle: Gram Positive Cocci in Clusters    -  Coagulase negative Staphylococcus: Detec    Specimen Source: .Blood Blood-Peripheral    Organism: Blood Culture PCR    Culture Results:   Growth in aerobic bottle: Coag Negative Staphylococcus  Single set isolate, possible contaminant. Contact  Microbiology if susceptibility testing clinically  indicated.      Culture - Blood (11.02.17 @ 18:07)    Gram Stain:   Growth in anaerobic bottle: Gram Positive Cocci in Clusters    Specimen Source: .Blood Blood-Peripheral    Culture Results:   Growth in anaerobic bottle: Coag Negative Staphylococcus  Single set isolate, possible contaminant. Contact  Microbiology if susceptibility testing clinically  indicated.      Culture - Blood (11.04.17 @ 22:37)    Specimen Source: .Blood Blood    Culture Results:   No growth at 5 days.      Culture - Blood (11.07.17 @ 08:47)    Specimen Source: .Blood Blood-Peripheral    Culture Results:   No growth to date.      Culture - Blood (11.02.17 @ 18:07)    Specimen Source: .Blood Blood-Venous    Culture Results:   No growth at 5 days.    Culture - Catheter (11.06.17 @ 23:36)    Specimen Source: .Catheter IV Dialysis Perm Cath    Culture Results:   No growth at 48 hours    Culture - Blood (10.24.17 @ 17:43)    Specimen Source: .Blood Blood-Peripheral    Culture Results:   No growth at 5 days.          VA Duplex Upper Ext Vein Scan, Bilat (11.07.17 @ 11:53) >  The diameters of the imaged portions of the right and left basilic veins   are tabulated below:        Right Cephalic Vein   NOT visualized       Right Basilic Vein       mid   0.27 cm  distal   0.22  ACF    0.11   prox forearm  0.06  mid      not visualized   distal   not visualized        Left Cephalic Vein   NOT visualized       Left Basilic Vein   prox upper arm  0.22 cm   mid   0.18  distal   0.08  ACF    0.03  prox forearm  0.06  mid   0.06  distal    not visualized       IMPRESSION: The diameters of the IMAGED right and left basilic veins are   entered in the table.

## 2017-11-17 NOTE — PROGRESS NOTE ADULT - SUBJECTIVE AND OBJECTIVE BOX
JIMI BUCHANAN  72y  Male    Patient is a 72y old  Male who presents with a chief complaint of s/p cardiac cath (24 Oct 2017 15:51)    seen on dialysis. more awake now. permacath works well. had an AVG which has a good thrill.        HPI:  71 yo M PMH CAD s/p 12-14 stents placed [id number], T2 DM. HTN HLD, w/ residual R sided facial weakness, radiculopathy with herniated disc, COPD, CKD Cr 2 months ago 1.2 on dialysis MWF, bipolar (no meds followed by psychiatry), chronic kidney stones and has had urethral tents in the past presents from rehab facility for episode of hypotension to SBP 90s while at dialysis today.         PAST MEDICAL & SURGICAL HISTORY:  CVA (cerebral vascular accident)  COPD (chronic obstructive pulmonary disease)  BPH (benign prostatic hyperplasia)  Bipolar affective disorder  CKD (chronic kidney disease)  Pancreatitis  Hypertension  Dyslipidemia  Diabetes mellitus  Coronary artery disease  HLD (hyperlipidemia)  Anemia  Acute renal failure  CKD (chronic kidney disease)  COPD (chronic obstructive pulmonary disease)  Fainting  Cardiac arrhythmia  Dizziness  Hydronephrosis  Cholecystitis  Bipolar 1 disorder  DM (diabetes mellitus)  HTN (hypertension)  Lumbar radiculopathy  Left heart failure  Reflux esophagitis  Coronary atherosclerosis  History of cardiac catheterization  No significant past surgical history          PHYSICAL EXAM:    T(C): 36.7 (11-17-17 @ 11:30), Max: 36.9 (11-16-17 @ 20:08)  HR: 99 (11-17-17 @ 11:30) (77 - 99)  BP: 105/66 (11-17-17 @ 11:30) (105/66 - 160/82)  RR: 18 (11-17-17 @ 11:30) (14 - 18)  SpO2: 98% (11-17-17 @ 11:30) (93% - 100%)  Wt(kg): --    I&O's Detail    16 Nov 2017 07:01  -  17 Nov 2017 07:00  --------------------------------------------------------  IN:    Oral Fluid: 80 mL    Solution: 100 mL  Total IN: 180 mL    OUT:  Total OUT: 0 mL    Total NET: 180 mL      17 Nov 2017 07:01  -  17 Nov 2017 12:44  --------------------------------------------------------  IN:  Total IN: 0 mL    OUT:    Other: 500 mL  Total OUT: 500 mL    Total NET: -500 mL          Respiratory: clear anteriorly, decreased BS at bases  Cardiovascular: S1 S2  Gastrointestinal: soft NT ND +BS  Extremities: edema   Neuro: Awake and alert    MEDICATIONS  (STANDING):  acetaminophen  IVPB. 1000 milliGRAM(s) IV Intermittent once  amiodarone    Tablet 200 milliGRAM(s) Oral daily  artificial  tears Solution 1 Drop(s) Both EYES four times a day  ascorbic acid 500 milliGRAM(s) Oral two times a day  aspirin  chewable 81 milliGRAM(s) Oral daily  atorvastatin 40 milliGRAM(s) Oral at bedtime  BACItracin   Ointment 1 Application(s) Topical two times a day  busPIRone 5 milliGRAM(s) Oral two times a day  clopidogrel Tablet 75 milliGRAM(s) Oral daily  clotrimazole/betamethasone Cream 1 Application(s) Topical two times a day  epoetin georgie Injectable 66863 Unit(s) IV Push every other day  fluticasone propionate   110 MICROgram(s) HFA Inhaler 1 Puff(s) Inhalation daily  heparin  Infusion. 700 Unit(s)/Hr (7 mL/Hr) IV Continuous <Continuous>  heparin  Injectable 5000 Unit(s) SubCutaneous every 12 hours  insulin lispro (HumaLOG) corrective regimen sliding scale   SubCutaneous Before meals and at bedtime  Nephro-sophie 1 Tablet(s) Oral daily  pantoprazole   Suspension 40 milliGRAM(s) Oral before breakfast  tamsulosin 0.4 milliGRAM(s) Oral at bedtime  vancomycin  IVPB 500 milliGRAM(s) IV Intermittent once  warfarin 3 milliGRAM(s) Oral once    MEDICATIONS  (PRN):  ALBUTerol    90 MICROgram(s) HFA Inhaler 2 Puff(s) Inhalation every 6 hours PRN Shortness of Breath and/or Wheezing                            8.6    8.83  )-----------( 288      ( 17 Nov 2017 08:05 )             29.0       11-17    137  |  95<L>  |  48<H>  ----------------------------<  107<H>  4.8   |  20<L>  |  5.51<H>    Ca    8.8      17 Nov 2017 08:09  Phos  3.1     11-16  Mg     2.0     11-16

## 2017-11-17 NOTE — PROGRESS NOTE ADULT - ASSESSMENT
72 male with a history of CVA, HTN, ESRD on HD. S/P sepsis, now improved.  stable dialysis via perma cath. Had right UE AVG yesterday.  labs and medications reviewed. continue supportive care. will follow.

## 2017-11-17 NOTE — PROGRESS NOTE ADULT - ASSESSMENT
72M ESRD admitted for bacteremia and sepsis 2/2 infected temporary dialysis catheter, blood cultures cleared, s/p AV graft insertion 11/16.       -con't R upper extremity arm precautions  -will discuss w/ Attending & f/u w/ primary team regarding timing of restarting anticoagulation, likely later today        SE Skaggs MD (PGY2)  Surgery Jr. Resident      (Please page [phone] for questions/concerns.) 72M ESRD admitted for bacteremia and sepsis 2/2 infected temporary dialysis catheter, blood cultures cleared, s/p AV graft insertion 11/16.       -con't R upper extremity arm precautions  -remove ACE this afternoon  -okay to resume anticoagulation  -please have patient f/u with Dr. Saez one week after discharge  -will sign off, call if any further questions  -d/w Dr. Se Skaggs MD (PGY2)  Surgery Jr. Resident      (Please page [phone] for questions/concerns.) 72M ESRD admitted for bacteremia and sepsis 2/2 infected temporary dialysis catheter, blood cultures cleared, s/p AV graft insertion 11/16.       -con't R upper extremity arm precautions  -remove ACE this afternoon  -okay to resume anticoagulation  -continue dialysis via RIJ permacath  -please have patient f/u with Dr. Saez one week after discharge  -will sign off, call if any further questions  -d/w Dr. Se Skaggs MD (PGY2)  Surgery Jr. Resident      (Please page [phone] for questions/concerns.)

## 2017-11-17 NOTE — PROGRESS NOTE ADULT - PROBLEM SELECTOR PLAN 2
ESRD on HD, nephrology on board Dr. Elif Solis - HD MWF via permcath.   - s/p permacath removal 11/6, replacement on 11/9, and RUE AVF placement on 11/16, received dialysis on 11/15  - next dialysis session to be today 11/17.   - Permacath had been oozing clotted blood but CDI presently, will continue to monitor with dressing changes, unlikely IR will intervene urgently. If worsens, will touch base with them.   - Vascular placed RUE AVF in OR yesterday - coumadin was held, will f/u vascular sx recommendations regarding when to resume heparin gtt and ultimately bridge back to coumadin ESRD on HD, nephrology on board Dr. Elif Solis - HD MWF via permcath.   - s/p permacath removal 11/6, replacement on 11/9, and RUE AVF placement on 11/16, received dialysis on 11/15  - next dialysis session to be today 11/17  - Permacath had been oozing clotted blood but CDI presently, will continue to monitor with dressing changes, unlikely IR will intervene urgently. If worsens, will touch base with them.   - Vascular placed RUE AVF in OR yesterday

## 2017-11-17 NOTE — PROGRESS NOTE ADULT - ASSESSMENT
72/M with T2DM, HTN, HLD, Embolic CVA with right hemiplegia, COPD, bipolar, bilateral nephrolithiasis and bladder stones, ASHD, discharged 10/18 after long hospital stay when initially presented with NSTEMI/pulm edema, UTI, VT s/p shock, embolic CVA, new AF, re-admitted after hypotensive episode in dialysis found with consistent bacteremia with COANS and was started on Vancomycin post HD, s/p permacath exchange and AVF placement for continued HD 3x/week.

## 2017-11-17 NOTE — PROGRESS NOTE ADULT - PROBLEM SELECTOR PLAN 3
- coumadin was held for AVF placement, will f/u vascular sx recommendations regarding when to resume heparin gtt and ultimately bridge back to coumadin - coumadin was held for AVF placement  - will resume Heparin gtt and bridge back to Coumadin

## 2017-11-18 LAB
ANION GAP SERPL CALC-SCNC: 22 MMOL/L — HIGH (ref 5–17)
APTT BLD: 58.2 SEC — HIGH (ref 27.5–37.4)
APTT BLD: 76.4 SEC — HIGH (ref 27.5–37.4)
BUN SERPL-MCNC: 29 MG/DL — HIGH (ref 7–23)
CALCIUM SERPL-MCNC: 9.3 MG/DL — SIGNIFICANT CHANGE UP (ref 8.4–10.5)
CHLORIDE SERPL-SCNC: 96 MMOL/L — SIGNIFICANT CHANGE UP (ref 96–108)
CO2 SERPL-SCNC: 20 MMOL/L — LOW (ref 22–31)
CREAT SERPL-MCNC: 3.43 MG/DL — HIGH (ref 0.5–1.3)
GLUCOSE BLDC GLUCOMTR-MCNC: 160 MG/DL — HIGH (ref 70–99)
GLUCOSE BLDC GLUCOMTR-MCNC: 163 MG/DL — HIGH (ref 70–99)
GLUCOSE BLDC GLUCOMTR-MCNC: 174 MG/DL — HIGH (ref 70–99)
GLUCOSE BLDC GLUCOMTR-MCNC: 243 MG/DL — HIGH (ref 70–99)
GLUCOSE SERPL-MCNC: 133 MG/DL — HIGH (ref 70–99)
HCT VFR BLD CALC: 30 % — LOW (ref 39–50)
HGB BLD-MCNC: 9.2 G/DL — LOW (ref 13–17)
INR BLD: 1.32 RATIO — HIGH (ref 0.88–1.16)
MCHC RBC-ENTMCNC: 27.9 PG — SIGNIFICANT CHANGE UP (ref 27–34)
MCHC RBC-ENTMCNC: 30.7 GM/DL — LOW (ref 32–36)
MCV RBC AUTO: 90.9 FL — SIGNIFICANT CHANGE UP (ref 80–100)
PLATELET # BLD AUTO: 318 K/UL — SIGNIFICANT CHANGE UP (ref 150–400)
POTASSIUM SERPL-MCNC: 4.2 MMOL/L — SIGNIFICANT CHANGE UP (ref 3.5–5.3)
POTASSIUM SERPL-SCNC: 4.2 MMOL/L — SIGNIFICANT CHANGE UP (ref 3.5–5.3)
PROTHROM AB SERPL-ACNC: 14.3 SEC — HIGH (ref 9.8–12.7)
RBC # BLD: 3.3 M/UL — LOW (ref 4.2–5.8)
RBC # FLD: 15.4 % — HIGH (ref 10.3–14.5)
SODIUM SERPL-SCNC: 138 MMOL/L — SIGNIFICANT CHANGE UP (ref 135–145)
WBC # BLD: 9.13 K/UL — SIGNIFICANT CHANGE UP (ref 3.8–10.5)
WBC # FLD AUTO: 9.13 K/UL — SIGNIFICANT CHANGE UP (ref 3.8–10.5)

## 2017-11-18 PROCEDURE — 99233 SBSQ HOSP IP/OBS HIGH 50: CPT | Mod: GC

## 2017-11-18 RX ORDER — VANCOMYCIN HCL 1 G
500 VIAL (EA) INTRAVENOUS ONCE
Qty: 0 | Refills: 0 | Status: COMPLETED | OUTPATIENT
Start: 2017-11-19 | End: 2017-11-19

## 2017-11-18 RX ORDER — WARFARIN SODIUM 2.5 MG/1
3 TABLET ORAL ONCE
Qty: 0 | Refills: 0 | Status: COMPLETED | OUTPATIENT
Start: 2017-11-18 | End: 2017-11-18

## 2017-11-18 RX ORDER — ACETAMINOPHEN 500 MG
1000 TABLET ORAL ONCE
Qty: 0 | Refills: 0 | Status: COMPLETED | OUTPATIENT
Start: 2017-11-18 | End: 2017-11-18

## 2017-11-18 RX ADMIN — Medication 2: at 08:29

## 2017-11-18 RX ADMIN — Medication 1 APPLICATION(S): at 05:06

## 2017-11-18 RX ADMIN — CLOTRIMAZOLE AND BETAMETHASONE DIPROPIONATE 1 APPLICATION(S): 10; .5 CREAM TOPICAL at 05:07

## 2017-11-18 RX ADMIN — Medication 1 PUFF(S): at 09:59

## 2017-11-18 RX ADMIN — HEPARIN SODIUM 900 UNIT(S)/HR: 5000 INJECTION INTRAVENOUS; SUBCUTANEOUS at 05:04

## 2017-11-18 RX ADMIN — Medication 5 MILLIGRAM(S): at 05:05

## 2017-11-18 RX ADMIN — Medication 1: at 21:56

## 2017-11-18 RX ADMIN — Medication 1: at 13:30

## 2017-11-18 RX ADMIN — Medication 1 APPLICATION(S): at 18:55

## 2017-11-18 RX ADMIN — Medication 1 DROP(S): at 05:08

## 2017-11-18 RX ADMIN — Medication 1 DROP(S): at 09:59

## 2017-11-18 RX ADMIN — WARFARIN SODIUM 3 MILLIGRAM(S): 2.5 TABLET ORAL at 19:35

## 2017-11-18 RX ADMIN — Medication 1 DROP(S): at 00:06

## 2017-11-18 RX ADMIN — HEPARIN SODIUM 900 UNIT(S)/HR: 5000 INJECTION INTRAVENOUS; SUBCUTANEOUS at 13:28

## 2017-11-18 RX ADMIN — Medication 5 MILLIGRAM(S): at 16:45

## 2017-11-18 RX ADMIN — AMIODARONE HYDROCHLORIDE 200 MILLIGRAM(S): 400 TABLET ORAL at 05:05

## 2017-11-18 RX ADMIN — Medication 1000 MILLIGRAM(S): at 19:42

## 2017-11-18 RX ADMIN — ATORVASTATIN CALCIUM 40 MILLIGRAM(S): 80 TABLET, FILM COATED ORAL at 21:06

## 2017-11-18 RX ADMIN — Medication 1: at 16:54

## 2017-11-18 RX ADMIN — Medication 1 TABLET(S): at 10:00

## 2017-11-18 RX ADMIN — CLOTRIMAZOLE AND BETAMETHASONE DIPROPIONATE 1 APPLICATION(S): 10; .5 CREAM TOPICAL at 18:56

## 2017-11-18 RX ADMIN — Medication 81 MILLIGRAM(S): at 09:59

## 2017-11-18 RX ADMIN — Medication 400 MILLIGRAM(S): at 18:55

## 2017-11-18 RX ADMIN — PANTOPRAZOLE SODIUM 40 MILLIGRAM(S): 20 TABLET, DELAYED RELEASE ORAL at 05:08

## 2017-11-18 RX ADMIN — TAMSULOSIN HYDROCHLORIDE 0.4 MILLIGRAM(S): 0.4 CAPSULE ORAL at 21:06

## 2017-11-18 RX ADMIN — CLOPIDOGREL BISULFATE 75 MILLIGRAM(S): 75 TABLET, FILM COATED ORAL at 09:59

## 2017-11-18 RX ADMIN — Medication 1 DROP(S): at 18:56

## 2017-11-18 RX ADMIN — Medication 500 MILLIGRAM(S): at 05:05

## 2017-11-18 NOTE — PROGRESS NOTE ADULT - SUBJECTIVE AND OBJECTIVE BOX
Patient is a 72y old  Male who presents with a chief complaint of s/p cardiac cath (24 Oct 2017 15:51)      SUBJECTIVE / OVERNIGHT EVENTS:  Patient seen and examined at bedside. No acute events overnight. Unable to obtain ROS as patient non-verbal with housestaff.    MEDICATIONS  (STANDING):  amiodarone    Tablet 200 milliGRAM(s) Oral daily  artificial  tears Solution 1 Drop(s) Both EYES four times a day  ascorbic acid 500 milliGRAM(s) Oral two times a day  aspirin  chewable 81 milliGRAM(s) Oral daily  atorvastatin 40 milliGRAM(s) Oral at bedtime  BACItracin   Ointment 1 Application(s) Topical two times a day  busPIRone 5 milliGRAM(s) Oral two times a day  clopidogrel Tablet 75 milliGRAM(s) Oral daily  clotrimazole/betamethasone Cream 1 Application(s) Topical two times a day  epoetin georgie Injectable 27182 Unit(s) IV Push every other day  fluticasone propionate   110 MICROgram(s) HFA Inhaler 1 Puff(s) Inhalation daily  heparin  Infusion. 700 Unit(s)/Hr (7 mL/Hr) IV Continuous <Continuous>  insulin lispro (HumaLOG) corrective regimen sliding scale   SubCutaneous Before meals and at bedtime  Nephro-sophie 1 Tablet(s) Oral daily  pantoprazole   Suspension 40 milliGRAM(s) Oral before breakfast  tamsulosin 0.4 milliGRAM(s) Oral at bedtime    MEDICATIONS  (PRN):  ALBUTerol    90 MICROgram(s) HFA Inhaler 2 Puff(s) Inhalation every 6 hours PRN Shortness of Breath and/or Wheezing  heparin  Injectable 6500 Unit(s) IV Push every 6 hours PRN For aPTT less than 40  heparin  Injectable 3000 Unit(s) IV Push every 6 hours PRN For aPTT between 40 - 57      Vital Signs Last 24 Hrs  T(C): 36.5 (18 Nov 2017 05:11), Max: 37.7 (17 Nov 2017 15:59)  T(F): 97.7 (18 Nov 2017 05:11), Max: 99.9 (17 Nov 2017 15:59)  HR: 97 (18 Nov 2017 05:11) (85 - 99)  BP: 122/74 (18 Nov 2017 05:11) (105/66 - 124/77)  BP(mean): --  RR: 17 (18 Nov 2017 05:11) (17 - 18)  SpO2: 98% (18 Nov 2017 05:11) (96% - 98%)  CAPILLARY BLOOD GLUCOSE      POCT Blood Glucose.: 175 mg/dL (17 Nov 2017 21:26)  POCT Blood Glucose.: 135 mg/dL (17 Nov 2017 17:00)  POCT Blood Glucose.: 110 mg/dL (17 Nov 2017 12:45)  POCT Blood Glucose.: 132 mg/dL (17 Nov 2017 07:58)    I&O's Summary    16 Nov 2017 07:01  -  17 Nov 2017 07:00  --------------------------------------------------------  IN: 180 mL / OUT: 0 mL / NET: 180 mL    17 Nov 2017 07:01  -  18 Nov 2017 06:08  --------------------------------------------------------  IN: 240 mL / OUT: 500 mL / NET: -260 mL        PHYSICAL EXAM:  GENERAL: NAD, well-developed  HEAD:  Atraumatic, Normocephalic  EYES: conjunctiva and sclera clear  NECK: Supple, No JVD  CHEST/LUNG: Clear to auscultation bilaterally; No wheeze  HEART: Regular rate and rhythm; no murmurs, rubs, or gallops  ABDOMEN: Soft, Nontender, Nondistended; Bowel sounds present  EXTREMITIES:  2+ Peripheral Pulses, No clubbing, cyanosis, or edema; RUE AVF w/ good thrill and bruit  PSYCH: awake and alert, not following commands  SKIN: sacral decubitus stage 3 ulcer present, dressing overlying permacath site CDI	    LABS:                        9.4    8.2   )-----------( 262      ( 17 Nov 2017 21:29 )             28.4     WBC Trend: 8.2<--, 8.83<--, 7.38<--  11-17    137  |  95<L>  |  48<H>  ----------------------------<  107<H>  4.8   |  20<L>  |  5.51<H>    Ca    8.8      17 Nov 2017 08:09  Phos  3.1     11-16  Mg     2.0     11-16      Creatinine Trend: 5.51<--, 4.11<--, 5.03<--, 3.66<--, 5.70<--, 4.29<--  PT/INR - ( 18 Nov 2017 04:29 )   PT: 14.3 sec;   INR: 1.32 ratio         PTT - ( 18 Nov 2017 04:29 )  PTT:58.2 sec            RADIOLOGY & ADDITIONAL TESTS:    Imaging Personally Reviewed:    Consultant(s) Notes Reviewed: Vascular Surgery, Nephrology, Internal Medicine    Care Discussed with Consultants/Other Providers:    CONTACT #: [id number] Patient is a 72y old  Male who presents with a chief complaint of s/p cardiac cath (24 Oct 2017 15:51)      SUBJECTIVE / OVERNIGHT EVENTS:  Patient seen and examined at bedside. No acute events overnight. Unable to obtain ROS as patient non-verbal with housestaff.    MEDICATIONS  (STANDING):  amiodarone    Tablet 200 milliGRAM(s) Oral daily  artificial  tears Solution 1 Drop(s) Both EYES four times a day  ascorbic acid 500 milliGRAM(s) Oral two times a day  aspirin  chewable 81 milliGRAM(s) Oral daily  atorvastatin 40 milliGRAM(s) Oral at bedtime  BACItracin   Ointment 1 Application(s) Topical two times a day  busPIRone 5 milliGRAM(s) Oral two times a day  clopidogrel Tablet 75 milliGRAM(s) Oral daily  clotrimazole/betamethasone Cream 1 Application(s) Topical two times a day  epoetin georgie Injectable 74297 Unit(s) IV Push every other day  fluticasone propionate   110 MICROgram(s) HFA Inhaler 1 Puff(s) Inhalation daily  heparin  Infusion. 700 Unit(s)/Hr (7 mL/Hr) IV Continuous <Continuous>  insulin lispro (HumaLOG) corrective regimen sliding scale   SubCutaneous Before meals and at bedtime  Nephro-sophie 1 Tablet(s) Oral daily  pantoprazole   Suspension 40 milliGRAM(s) Oral before breakfast  tamsulosin 0.4 milliGRAM(s) Oral at bedtime    MEDICATIONS  (PRN):  ALBUTerol    90 MICROgram(s) HFA Inhaler 2 Puff(s) Inhalation every 6 hours PRN Shortness of Breath and/or Wheezing  heparin  Injectable 6500 Unit(s) IV Push every 6 hours PRN For aPTT less than 40  heparin  Injectable 3000 Unit(s) IV Push every 6 hours PRN For aPTT between 40 - 57      Vital Signs Last 24 Hrs  T(C): 36.5 (18 Nov 2017 05:11), Max: 37.7 (17 Nov 2017 15:59)  T(F): 97.7 (18 Nov 2017 05:11), Max: 99.9 (17 Nov 2017 15:59)  HR: 97 (18 Nov 2017 05:11) (85 - 99)  BP: 122/74 (18 Nov 2017 05:11) (105/66 - 124/77)  BP(mean): --  RR: 17 (18 Nov 2017 05:11) (17 - 18)  SpO2: 98% (18 Nov 2017 05:11) (96% - 98%)  CAPILLARY BLOOD GLUCOSE    POCT  Blood Glucose (11.18.17 @ 08:24)    POCT Blood Glucose.: 243 mg/dL  POCT Blood Glucose.: 175 mg/dL (17 Nov 2017 21:26)  POCT Blood Glucose.: 135 mg/dL (17 Nov 2017 17:00)  POCT Blood Glucose.: 110 mg/dL (17 Nov 2017 12:45)  POCT Blood Glucose.: 132 mg/dL (17 Nov 2017 07:58)    I&O's Summary    16 Nov 2017 07:01  -  17 Nov 2017 07:00  --------------------------------------------------------  IN: 180 mL / OUT: 0 mL / NET: 180 mL    17 Nov 2017 07:01  -  18 Nov 2017 06:08  --------------------------------------------------------  IN: 240 mL / OUT: 500 mL / NET: -260 mL        PHYSICAL EXAM:  GENERAL: NAD, well-developed  HEAD:  Atraumatic, Normocephalic  EYES: conjunctiva and sclera clear  NECK: Supple, No JVD  CHEST/LUNG: Clear to auscultation bilaterally; No wheeze  HEART: Regular rate and rhythm; no murmurs, rubs, or gallops  ABDOMEN: Soft, Nontender, Nondistended; Bowel sounds present  EXTREMITIES:  2+ Peripheral Pulses, No clubbing, cyanosis, or edema; RUE w/ overlying ace bandage  PSYCH: awake and alert, not following commands  SKIN: sacral decubitus stage 3 ulcer present, dressing overlying permacath site CDI	    LABS:                                   9.2    9.13  )-----------( 318      ( 18 Nov 2017 08:37 )             30.0     WBC Trend: 9.13<--, 8.2<--, 8.83<--, 7.38<--  11-17    137  |  95<L>  |  48<H>  ----------------------------<  107<H>  4.8   |  20<L>  |  5.51<H>    Ca    8.8      17 Nov 2017 08:09  Phos  3.1     11-16  Mg     2.0     11-16      Creatinine Trend: 5.51<--, 4.11<--, 5.03<--, 3.66<--, 5.70<--, 4.29<--  PT/INR - ( 18 Nov 2017 04:29 )   PT: 14.3 sec;   INR: 1.32 ratio         PTT - ( 18 Nov 2017 04:29 )  PTT:58.2 sec            RADIOLOGY & ADDITIONAL TESTS:    Imaging Personally Reviewed:    Consultant(s) Notes Reviewed: Vascular Surgery, Nephrology, Internal Medicine    Care Discussed with Consultants/Other Providers:    CONTACT #: [id number] Patient is a 72y old  Male who presents with a chief complaint of s/p cardiac cath (24 Oct 2017 15:51)      SUBJECTIVE / OVERNIGHT EVENTS:  Patient seen and examined at bedside. No acute events overnight. Unable to obtain ROS as patient non-verbal with housestaff.    MEDICATIONS  (STANDING):  amiodarone    Tablet 200 milliGRAM(s) Oral daily  artificial  tears Solution 1 Drop(s) Both EYES four times a day  ascorbic acid 500 milliGRAM(s) Oral two times a day  aspirin  chewable 81 milliGRAM(s) Oral daily  atorvastatin 40 milliGRAM(s) Oral at bedtime  BACItracin   Ointment 1 Application(s) Topical two times a day  busPIRone 5 milliGRAM(s) Oral two times a day  clopidogrel Tablet 75 milliGRAM(s) Oral daily  clotrimazole/betamethasone Cream 1 Application(s) Topical two times a day  epoetin georgie Injectable 11253 Unit(s) IV Push every other day  fluticasone propionate   110 MICROgram(s) HFA Inhaler 1 Puff(s) Inhalation daily  heparin  Infusion. 700 Unit(s)/Hr (7 mL/Hr) IV Continuous <Continuous>  insulin lispro (HumaLOG) corrective regimen sliding scale   SubCutaneous Before meals and at bedtime  Nephro-sophie 1 Tablet(s) Oral daily  pantoprazole   Suspension 40 milliGRAM(s) Oral before breakfast  tamsulosin 0.4 milliGRAM(s) Oral at bedtime    MEDICATIONS  (PRN):  ALBUTerol    90 MICROgram(s) HFA Inhaler 2 Puff(s) Inhalation every 6 hours PRN Shortness of Breath and/or Wheezing  heparin  Injectable 6500 Unit(s) IV Push every 6 hours PRN For aPTT less than 40  heparin  Injectable 3000 Unit(s) IV Push every 6 hours PRN For aPTT between 40 - 57      Vital Signs Last 24 Hrs  T(C): 36.5 (18 Nov 2017 05:11), Max: 37.7 (17 Nov 2017 15:59)  T(F): 97.7 (18 Nov 2017 05:11), Max: 99.9 (17 Nov 2017 15:59)  HR: 97 (18 Nov 2017 05:11) (85 - 99)  BP: 122/74 (18 Nov 2017 05:11) (105/66 - 124/77)  BP(mean): --  RR: 17 (18 Nov 2017 05:11) (17 - 18)  SpO2: 98% (18 Nov 2017 05:11) (96% - 98%)  CAPILLARY BLOOD GLUCOSE    POCT  Blood Glucose (11.18.17 @ 08:24)    POCT Blood Glucose.: 243 mg/dL  POCT Blood Glucose.: 175 mg/dL (17 Nov 2017 21:26)  POCT Blood Glucose.: 135 mg/dL (17 Nov 2017 17:00)  POCT Blood Glucose.: 110 mg/dL (17 Nov 2017 12:45)  POCT Blood Glucose.: 132 mg/dL (17 Nov 2017 07:58)    I&O's Summary    16 Nov 2017 07:01  -  17 Nov 2017 07:00  --------------------------------------------------------  IN: 180 mL / OUT: 0 mL / NET: 180 mL    17 Nov 2017 07:01  -  18 Nov 2017 06:08  --------------------------------------------------------  IN: 240 mL / OUT: 500 mL / NET: -260 mL        PHYSICAL EXAM:  GENERAL: NAD, well-developed  HEAD:  Atraumatic, Normocephalic  EYES: conjunctiva and sclera clear  NECK: Supple, No JVD  CHEST/LUNG: Clear to auscultation bilaterally; No wheeze  HEART: Regular rate and rhythm; no murmurs, rubs, or gallops  ABDOMEN: Soft, Nontender, Nondistended; Bowel sounds present  EXTREMITIES:  2+ Peripheral Pulses, No clubbing, cyanosis, or edema; RUE w/ overlying ace bandage  PSYCH: awake and alert, not following commands  SKIN: sacral decubitus stage 3 ulcer present, dressing overlying permacath site CDI	    LABS:                                   9.2    9.13  )-----------( 318      ( 18 Nov 2017 08:37 )             30.0     WBC Trend: 9.13<--, 8.2<--, 8.83<--, 7.38<--  11-18    138  |  96  |  29<H>  ----------------------------<  133<H>  4.2   |  20<L>  |  3.43<H>    Ca    9.3      18 Nov 2017 08:40    Creatinine Trend: 3.43<--, 5.51<--, 4.11<--, 5.03<--, 3.66<--, 5.70<--, 4.29<--  PT/INR - ( 18 Nov 2017 04:29 )   PT: 14.3 sec;   INR: 1.32 ratio         PTT - ( 18 Nov 2017 04:29 )  PTT:58.2 sec            RADIOLOGY & ADDITIONAL TESTS:    Imaging Personally Reviewed:    Consultant(s) Notes Reviewed: Vascular Surgery, Nephrology, Internal Medicine    Care Discussed with Consultants/Other Providers:    CONTACT #: [id number]

## 2017-11-18 NOTE — PROGRESS NOTE ADULT - PROBLEM SELECTOR PLAN 10
DVT PPX: coumadin was held for AVF placement, resumed heparin gtt and bridging back to coumadin    Dispo: PT recommends d/c to [facility]

## 2017-11-18 NOTE — PROGRESS NOTE ADULT - PROBLEM SELECTOR PLAN 1
Afebrile, No SOB. Cultures from 11/7 negative x2 after 5 days. S/p Permacath removal on 11/6, replacement on 11/9, and RUE AVF placement on 11/16.   - c/w IV Vanco 500mg post HD (most recent dose 11/17 after HD). Last day of tx is November 23rd (14 days after catheter replacement)  - CT A/P negative, CXR negative

## 2017-11-18 NOTE — PROGRESS NOTE ADULT - SUBJECTIVE AND OBJECTIVE BOX
JIMI BUCHANAN  72y  Male    Patient is a 72y old  Male who presents with a chief complaint of s/p cardiac cath (24 Oct 2017 15:51)    awake and alert. family at bed side.       HPI:  71 yo M PMH CAD s/p 12-14 stents placed [id number], T2 DM. HTN HLD, w/ residual R sided facial weakness, radiculopathy with herniated disc, COPD, CKD Cr 2 months ago 1.2 on dialysis MWF, bipolar (no meds followed by psychiatry), chronic kidney stones and has had urethral tents in the past presents from rehab facility for episode of hypotension to SBP 90s while at dialysis today.   Pt was discharged 10/18 after long hospital stay when initially presented with NSTEMI/pulm edema and transferred to [facility] 9/12 for LHC + BABAR x 3 + ARF on CKD +HD, + UTI on CTX.  Pt declined HD at one point, rising K, did undergo HD and later afterwards + unresponsive, CODE BLUE x 20 min with ROSC c/b VT s/p shock/amio load c/b CVA (r.weakness + embolic CVAs on CT), + new AF + heparin gtt c/b groin hematoma s/p txn, new CHF EF 35%, + palliative care discussions + dnr/dni.  Planned to cont HD via new [facility].  dysphagia treated with dysphagia 1 diet with honey liquids, PPM declined by family and sent to rehab.  Pt sbps low 100s to 110s.  on discharge trop 7.64    Spoke with daughter Anisa Buchanan [phone].  Pt has been poorly responsive since CVA.  Reportedly pt can grimace or shake head yes/no to some questions.  Has been eating/drinking and tolerated regular food yesterday.  Pt went to first HD yesterday and became hypotensive but daughter had not noted any change in his behavior or cognition.  Confirmed dnr/dni.    In ED 99/62, hr 78, rr 18, temp 98.3, sat 100  no interventions done. (21 Oct 2017 00:52)      PAST MEDICAL & SURGICAL HISTORY:  CVA (cerebral vascular accident)  COPD (chronic obstructive pulmonary disease)  BPH (benign prostatic hyperplasia)  Bipolar affective disorder  CKD (chronic kidney disease)  Pancreatitis  Hypertension  Dyslipidemia  Diabetes mellitus  Coronary artery disease  HLD (hyperlipidemia)  Anemia  Acute renal failure  CKD (chronic kidney disease)  COPD (chronic obstructive pulmonary disease)  Fainting  Cardiac arrhythmia  Dizziness  Hydronephrosis  Cholecystitis  Bipolar 1 disorder  DM (diabetes mellitus)  HTN (hypertension)  Lumbar radiculopathy  Left heart failure  Reflux esophagitis  Coronary atherosclerosis  History of cardiac catheterization  No significant past surgical history          PHYSICAL EXAM:    T(C): 37.5 (11-18-17 @ 15:55), Max: 37.5 (11-18-17 @ 15:55)  HR: 87 (11-18-17 @ 15:55) (80 - 98)  BP: 112/68 (11-18-17 @ 15:55) (110/60 - 125/73)  RR: 18 (11-18-17 @ 15:55) (17 - 18)  SpO2: 98% (11-18-17 @ 15:55) (96% - 99%)  Wt(kg): --    I&O's Detail    17 Nov 2017 07:01  -  18 Nov 2017 07:00  --------------------------------------------------------  IN:    heparin  Infusion.: 102 mL    Oral Fluid: 260 mL  Total IN: 362 mL    OUT:    Other: 500 mL  Total OUT: 500 mL    Total NET: -138 mL      18 Nov 2017 07:01  -  18 Nov 2017 18:37  --------------------------------------------------------  IN:    Oral Fluid: 50 mL  Total IN: 50 mL    OUT:  Total OUT: 0 mL    Total NET: 50 mL          Respiratory: clear anteriorly, decreased BS at bases  Cardiovascular: S1 S2  Gastrointestinal: soft NT ND +BS  Extremities: edema   Neuro: Awake and alert    MEDICATIONS  (STANDING):  amiodarone    Tablet 200 milliGRAM(s) Oral daily  artificial  tears Solution 1 Drop(s) Both EYES four times a day  aspirin  chewable 81 milliGRAM(s) Oral daily  atorvastatin 40 milliGRAM(s) Oral at bedtime  BACItracin   Ointment 1 Application(s) Topical two times a day  busPIRone 5 milliGRAM(s) Oral two times a day  clopidogrel Tablet 75 milliGRAM(s) Oral daily  clotrimazole/betamethasone Cream 1 Application(s) Topical two times a day  epoetin georgie Injectable 86910 Unit(s) IV Push every other day  fluticasone propionate   110 MICROgram(s) HFA Inhaler 1 Puff(s) Inhalation daily  heparin  Infusion. 700 Unit(s)/Hr (7 mL/Hr) IV Continuous <Continuous>  insulin lispro (HumaLOG) corrective regimen sliding scale   SubCutaneous Before meals and at bedtime  Nephro-sophie 1 Tablet(s) Oral daily  pantoprazole   Suspension 40 milliGRAM(s) Oral before breakfast  tamsulosin 0.4 milliGRAM(s) Oral at bedtime  warfarin 3 milliGRAM(s) Oral once    MEDICATIONS  (PRN):  ALBUTerol    90 MICROgram(s) HFA Inhaler 2 Puff(s) Inhalation every 6 hours PRN Shortness of Breath and/or Wheezing  heparin  Injectable 6500 Unit(s) IV Push every 6 hours PRN For aPTT less than 40  heparin  Injectable 3000 Unit(s) IV Push every 6 hours PRN For aPTT between 40 - 57                            9.2    9.13  )-----------( 318      ( 18 Nov 2017 08:37 )             30.0       11-18    138  |  96  |  29<H>  ----------------------------<  133<H>  4.2   |  20<L>  |  3.43<H>    Ca    9.3      18 Nov 2017 08:40

## 2017-11-18 NOTE — PROGRESS NOTE ADULT - PROBLEM SELECTOR PLAN 2
ESRD on HD, nephrology on board Dr. Elif Solis - HD MWF via permcath.   - s/p permacath removal 11/6, replacement on 11/9, and RUE AVF placement on 11/16, received dialysis on 11/17  - next dialysis session to be Monday 11/20  - Permacath had been oozing clotted blood but CDI presently, will continue to monitor with dressing changes, unlikely IR will intervene urgently. If worsens, will touch base with them.   - Vascular placed RUE AVF in OR on 11/16 ESRD on HD, nephrology on board Dr. Elif Solis - HD MWF via permcath.   - s/p permacath removal 11/6, replacement on 11/9, and RUE AVF placement on 11/16, received dialysis on 11/17  - next dialysis session to be Sunday 11/19  - Permacath had been oozing clotted blood but CDI presently, will continue to monitor with dressing changes, unlikely IR will intervene urgently. If worsens, will touch base with them.   - Vascular placed RUE AVF in OR on 11/16

## 2017-11-18 NOTE — PROGRESS NOTE ADULT - PROBLEM SELECTOR PLAN 9
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 Has been stable  - c/w ASA, Plavix, statin  - coumadin was held for AVF placement, resumed heparin gtt and bridging back to coumadin

## 2017-11-19 LAB
ANION GAP SERPL CALC-SCNC: 20 MMOL/L — HIGH (ref 5–17)
APTT BLD: 42.6 SEC — HIGH (ref 27.5–37.4)
APTT BLD: > 200 SEC (ref 27.5–37.4)
BUN SERPL-MCNC: 48 MG/DL — HIGH (ref 7–23)
CALCIUM SERPL-MCNC: 9.6 MG/DL — SIGNIFICANT CHANGE UP (ref 8.4–10.5)
CHLORIDE SERPL-SCNC: 97 MMOL/L — SIGNIFICANT CHANGE UP (ref 96–108)
CO2 SERPL-SCNC: 22 MMOL/L — SIGNIFICANT CHANGE UP (ref 22–31)
CREAT SERPL-MCNC: 5.42 MG/DL — HIGH (ref 0.5–1.3)
GLUCOSE BLDC GLUCOMTR-MCNC: 104 MG/DL — HIGH (ref 70–99)
GLUCOSE BLDC GLUCOMTR-MCNC: 132 MG/DL — HIGH (ref 70–99)
GLUCOSE BLDC GLUCOMTR-MCNC: 142 MG/DL — HIGH (ref 70–99)
GLUCOSE BLDC GLUCOMTR-MCNC: 145 MG/DL — HIGH (ref 70–99)
GLUCOSE SERPL-MCNC: 121 MG/DL — HIGH (ref 70–99)
HCT VFR BLD CALC: 26 % — LOW (ref 39–50)
HCT VFR BLD CALC: 28.7 % — LOW (ref 39–50)
HGB BLD-MCNC: 8.1 G/DL — LOW (ref 13–17)
HGB BLD-MCNC: 9.2 G/DL — LOW (ref 13–17)
INR BLD: 3.19 RATIO — HIGH (ref 0.88–1.16)
MCHC RBC-ENTMCNC: 27.8 PG — SIGNIFICANT CHANGE UP (ref 27–34)
MCHC RBC-ENTMCNC: 29.5 PG — SIGNIFICANT CHANGE UP (ref 27–34)
MCHC RBC-ENTMCNC: 31.2 GM/DL — LOW (ref 32–36)
MCHC RBC-ENTMCNC: 32.2 GM/DL — SIGNIFICANT CHANGE UP (ref 32–36)
MCV RBC AUTO: 89.3 FL — SIGNIFICANT CHANGE UP (ref 80–100)
MCV RBC AUTO: 91.6 FL — SIGNIFICANT CHANGE UP (ref 80–100)
PLATELET # BLD AUTO: 283 K/UL — SIGNIFICANT CHANGE UP (ref 150–400)
PLATELET # BLD AUTO: 285 K/UL — SIGNIFICANT CHANGE UP (ref 150–400)
POTASSIUM SERPL-MCNC: 3.9 MMOL/L — SIGNIFICANT CHANGE UP (ref 3.5–5.3)
POTASSIUM SERPL-SCNC: 3.9 MMOL/L — SIGNIFICANT CHANGE UP (ref 3.5–5.3)
PROTHROM AB SERPL-ACNC: 36.9 SEC — HIGH (ref 10–13.1)
RBC # BLD: 2.91 M/UL — LOW (ref 4.2–5.8)
RBC # BLD: 3.13 M/UL — LOW (ref 4.2–5.8)
RBC # FLD: 14.4 % — SIGNIFICANT CHANGE UP (ref 10.3–14.5)
RBC # FLD: 15.3 % — HIGH (ref 10.3–14.5)
SODIUM SERPL-SCNC: 139 MMOL/L — SIGNIFICANT CHANGE UP (ref 135–145)
WBC # BLD: 7.6 K/UL — SIGNIFICANT CHANGE UP (ref 3.8–10.5)
WBC # BLD: 8 K/UL — SIGNIFICANT CHANGE UP (ref 3.8–10.5)
WBC # FLD AUTO: 7.6 K/UL — SIGNIFICANT CHANGE UP (ref 3.8–10.5)
WBC # FLD AUTO: 8 K/UL — SIGNIFICANT CHANGE UP (ref 3.8–10.5)

## 2017-11-19 PROCEDURE — 99233 SBSQ HOSP IP/OBS HIGH 50: CPT | Mod: GC

## 2017-11-19 RX ORDER — HEPARIN SODIUM 5000 [USP'U]/ML
3000 INJECTION INTRAVENOUS; SUBCUTANEOUS EVERY 6 HOURS
Qty: 0 | Refills: 0 | Status: DISCONTINUED | OUTPATIENT
Start: 2017-11-19 | End: 2017-11-21

## 2017-11-19 RX ORDER — HEPARIN SODIUM 5000 [USP'U]/ML
6500 INJECTION INTRAVENOUS; SUBCUTANEOUS EVERY 6 HOURS
Qty: 0 | Refills: 0 | Status: DISCONTINUED | OUTPATIENT
Start: 2017-11-19 | End: 2017-11-21

## 2017-11-19 RX ORDER — HEPARIN SODIUM 5000 [USP'U]/ML
600 INJECTION INTRAVENOUS; SUBCUTANEOUS
Qty: 25000 | Refills: 0 | Status: DISCONTINUED | OUTPATIENT
Start: 2017-11-19 | End: 2017-11-21

## 2017-11-19 RX ADMIN — PANTOPRAZOLE SODIUM 40 MILLIGRAM(S): 20 TABLET, DELAYED RELEASE ORAL at 13:30

## 2017-11-19 RX ADMIN — ATORVASTATIN CALCIUM 40 MILLIGRAM(S): 80 TABLET, FILM COATED ORAL at 21:38

## 2017-11-19 RX ADMIN — TAMSULOSIN HYDROCHLORIDE 0.4 MILLIGRAM(S): 0.4 CAPSULE ORAL at 21:38

## 2017-11-19 RX ADMIN — HEPARIN SODIUM 800 UNIT(S)/HR: 5000 INJECTION INTRAVENOUS; SUBCUTANEOUS at 23:32

## 2017-11-19 RX ADMIN — HEPARIN SODIUM 3000 UNIT(S): 5000 INJECTION INTRAVENOUS; SUBCUTANEOUS at 23:33

## 2017-11-19 RX ADMIN — HEPARIN SODIUM 600 UNIT(S)/HR: 5000 INJECTION INTRAVENOUS; SUBCUTANEOUS at 16:41

## 2017-11-19 RX ADMIN — Medication 81 MILLIGRAM(S): at 13:31

## 2017-11-19 RX ADMIN — Medication 1 DROP(S): at 00:35

## 2017-11-19 RX ADMIN — Medication 1 PUFF(S): at 13:31

## 2017-11-19 RX ADMIN — CLOTRIMAZOLE AND BETAMETHASONE DIPROPIONATE 1 APPLICATION(S): 10; .5 CREAM TOPICAL at 06:57

## 2017-11-19 RX ADMIN — CLOPIDOGREL BISULFATE 75 MILLIGRAM(S): 75 TABLET, FILM COATED ORAL at 13:31

## 2017-11-19 RX ADMIN — Medication 1 DROP(S): at 06:57

## 2017-11-19 RX ADMIN — AMIODARONE HYDROCHLORIDE 200 MILLIGRAM(S): 400 TABLET ORAL at 13:30

## 2017-11-19 RX ADMIN — ERYTHROPOIETIN 10000 UNIT(S): 10000 INJECTION, SOLUTION INTRAVENOUS; SUBCUTANEOUS at 10:49

## 2017-11-19 RX ADMIN — Medication 1 APPLICATION(S): at 18:00

## 2017-11-19 RX ADMIN — CLOTRIMAZOLE AND BETAMETHASONE DIPROPIONATE 1 APPLICATION(S): 10; .5 CREAM TOPICAL at 18:00

## 2017-11-19 RX ADMIN — Medication 1 APPLICATION(S): at 06:57

## 2017-11-19 RX ADMIN — Medication 1 TABLET(S): at 13:27

## 2017-11-19 RX ADMIN — Medication 1 DROP(S): at 13:32

## 2017-11-19 RX ADMIN — Medication 5 MILLIGRAM(S): at 13:30

## 2017-11-19 RX ADMIN — Medication 1 DROP(S): at 23:35

## 2017-11-19 RX ADMIN — Medication 1 DROP(S): at 19:42

## 2017-11-19 RX ADMIN — HEPARIN SODIUM 0 UNIT(S)/HR: 5000 INJECTION INTRAVENOUS; SUBCUTANEOUS at 15:41

## 2017-11-19 RX ADMIN — Medication 100 MILLIGRAM(S): at 16:44

## 2017-11-19 RX ADMIN — Medication 5 MILLIGRAM(S): at 19:42

## 2017-11-19 NOTE — PROVIDER CONTACT NOTE (CRITICAL VALUE NOTIFICATION) - BACKGROUND
Hypotension
Patient admitted for hypotension.
Pt had + cultures with variable rods before and is on IV abx.
Sepsis; hypotension; left AVF;
cva, diabetes mellitus 2
esrd cva
+bld cultures

## 2017-11-19 NOTE — PROGRESS NOTE ADULT - SUBJECTIVE AND OBJECTIVE BOX
Patient is a 72y old  Male who presents with a chief complaint of s/p cardiac cath (24 Oct 2017 15:51)      SUBJECTIVE / OVERNIGHT EVENTS:  No acute events overnight. Patient was seen at dialysis and is tolerating it well.    REVIEW OF SYSTEMS:  unable to obtain - patient not answering questions    MEDICATIONS  (STANDING):  amiodarone    Tablet 200 milliGRAM(s) Oral daily  artificial  tears Solution 1 Drop(s) Both EYES four times a day  aspirin  chewable 81 milliGRAM(s) Oral daily  atorvastatin 40 milliGRAM(s) Oral at bedtime  BACItracin   Ointment 1 Application(s) Topical two times a day  busPIRone 5 milliGRAM(s) Oral two times a day  clopidogrel Tablet 75 milliGRAM(s) Oral daily  clotrimazole/betamethasone Cream 1 Application(s) Topical two times a day  epoetin georgie Injectable 87539 Unit(s) IV Push every other day  fluticasone propionate   110 MICROgram(s) HFA Inhaler 1 Puff(s) Inhalation daily  heparin  Infusion. 700 Unit(s)/Hr (7 mL/Hr) IV Continuous <Continuous>  insulin lispro (HumaLOG) corrective regimen sliding scale   SubCutaneous Before meals and at bedtime  Nephro-sophie 1 Tablet(s) Oral daily  pantoprazole   Suspension 40 milliGRAM(s) Oral before breakfast  tamsulosin 0.4 milliGRAM(s) Oral at bedtime  vancomycin  IVPB 500 milliGRAM(s) IV Intermittent once    MEDICATIONS  (PRN):  ALBUTerol    90 MICROgram(s) HFA Inhaler 2 Puff(s) Inhalation every 6 hours PRN Shortness of Breath and/or Wheezing  heparin  Injectable 6500 Unit(s) IV Push every 6 hours PRN For aPTT less than 40  heparin  Injectable 3000 Unit(s) IV Push every 6 hours PRN For aPTT between 40 - 57      CAPILLARY BLOOD GLUCOSE      POCT Blood Glucose.: 142 mg/dL (19 Nov 2017 08:19)  POCT Blood Glucose.: 160 mg/dL (18 Nov 2017 21:48)  POCT Blood Glucose.: 163 mg/dL (18 Nov 2017 16:52)  POCT Blood Glucose.: 174 mg/dL (18 Nov 2017 12:07)    I&O's Summary    18 Nov 2017 07:01  -  19 Nov 2017 07:00  --------------------------------------------------------  IN: 170 mL / OUT: 0 mL / NET: 170 mL        PHYSICAL EXAM:  GENERAL: NAD, laying comfortably in bed  EYES:  PERRLA, conjunctiva and sclera clear  CHEST/LUNG: Clear to auscultation bilaterally; No wheeze  HEART: Regular rate and rhythm; No murmurs, rubs, or gallops  ABDOMEN: Soft, Nontender, Nondistended; Bowel sounds present  EXTREMITIES:  No clubbing, cyanosis, or edema; mild LUE decorticate posturing  PSYCH: normal mood/affect  NEUROLOGY: unable to assess full neuro exam; patient awake and alert - responds to name; but not following commands  SKIN: Permacath site clean/dry/intact    LABS:                        9.2    9.13  )-----------( 318      ( 18 Nov 2017 08:37 )             30.0     11-18    138  |  96  |  29<H>  ----------------------------<  133<H>  4.2   |  20<L>  |  3.43<H>    Ca    9.3      18 Nov 2017 08:40      PT/INR - ( 18 Nov 2017 04:29 )   PT: 14.3 sec;   INR: 1.32 ratio         PTT - ( 18 Nov 2017 12:48 )  PTT:76.4 sec            RADIOLOGY & ADDITIONAL TESTS:    Imaging Personally Reviewed:    Consultant(s) Notes Reviewed:  Nephrology    Care Discussed with Consultants/Other Providers:

## 2017-11-19 NOTE — PROGRESS NOTE ADULT - PROBLEM SELECTOR PLAN 1
Afebrile, No SOB. Cultures from 11/7 negative x2 after 5 days. S/p Permacath removal on 11/6, replacement on 11/9, and RUE AVF placement on 11/16.   - c/w IV Vanco 500mg post HD. Last day of tx is November 23rd (14 days after catheter replacement)  - CT A/P negative, CXR negative

## 2017-11-19 NOTE — PROVIDER CONTACT NOTE (CRITICAL VALUE NOTIFICATION) - ASSESSMENT
Pt has no change in condition.
VSS, no signs of any acute distress noted
asymptomatic
awake, confused, noncombative
vss; no signs of bleeding
afebrile

## 2017-11-19 NOTE — PROVIDER CONTACT NOTE (CRITICAL VALUE NOTIFICATION) - ACTION/TREATMENT ORDERED:
Continue to monitor patient.
Heparin drip stopped for and hour; to be restarted with decreased rate of 6ml/hr.  will continue to monitor.
Con't to monitor. Con't present tx.
No new order, continue to monitor pt.
cont with abt treatment
continue to monitor
heparin drip stopped for 1 hr as per protocol and restart at 7cc/r

## 2017-11-19 NOTE — PROGRESS NOTE ADULT - ASSESSMENT
72-year-old male with T2DM, HTN, HLD, Embolic CVA with right hemiplegia, COPD, bipolar, bilateral nephrolithiasis and bladder stones, ASHD, discharged 10/18 after long hospital stay when initially presented with NSTEMI/pulm edema, UTI, VT s/p shock, embolic CVA, new AF, re-admitted after hypotensive episode in dialysis found with consistent bacteremia with COANS and was started on Vancomycin post HD, s/p permacath exchange and AVF placement for continued HD 3x/week.

## 2017-11-19 NOTE — PROVIDER CONTACT NOTE (MEDICATION) - ASSESSMENT
Pt vss; pt refusing medications; nonverbal; Pt to go to dialysis 1st shift; pt usually takes medications with family present

## 2017-11-19 NOTE — PROGRESS NOTE ADULT - SUBJECTIVE AND OBJECTIVE BOX
JIMI BUCHANAN  72y  Male    Patient is a 72y old  Male who presents with a chief complaint of s/p cardiac cath (24 Oct 2017 15:51)    awake and alert. Denies any chest pain. S/P AVG.   perma cath works well.       HPI:  71 yo M PMH CAD s/p 12-14 stents placed [id number], T2 DM. HTN HLD, w/ residual R sided facial weakness, radiculopathy with herniated disc, COPD, CKD Cr 2 months ago 1.2 on dialysis MWF, bipolar (no meds followed by psychiatry), chronic kidney stones and has had urethral tents in the past presents from rehab facility for episode of hypotension to SBP 90s while at dialysis today.   Pt was discharged 10/18 after long hospital stay when initially presented with NSTEMI/pulm edema and transferred to [facility] 9/12 for LHC + BABAR x 3 + ARF on CKD +HD, + UTI on CTX.  Pt declined HD at one point, rising K, did undergo HD and later afterwards + unresponsive, CODE BLUE x 20 min with ROSC c/b VT s/p shock/amio load c/b CVA (r.weakness + embolic CVAs on CT), + new AF + heparin gtt c/b groin hematoma s/p txn, new CHF EF 35%, + palliative care discussions + dnr/dni.  Planned to cont HD via new r.permcath.  dysphagia treated with dysphagia 1 diet with honey liquids, PPM declined by family and sent to rehab.  Pt sbps low 100s to 110s.  on discharge trop 7.64    Spoke with daughter Anisa Buchanan [phone].  Pt has been poorly responsive since CVA.  Reportedly pt can grimace or shake head yes/no to some questions.  Has been eating/drinking and tolerated regular food yesterday.  Pt went to first HD yesterday and became hypotensive but daughter had not noted any change in his behavior or cognition.  Confirmed dnr/dni.    In ED 99/62, hr 78, rr 18, temp 98.3, sat 100  no interventions done. (21 Oct 2017 00:52)      PAST MEDICAL & SURGICAL HISTORY:  CVA (cerebral vascular accident)  COPD (chronic obstructive pulmonary disease)  BPH (benign prostatic hyperplasia)  Bipolar affective disorder  CKD (chronic kidney disease)  Pancreatitis  Hypertension  Dyslipidemia  Diabetes mellitus  Coronary artery disease  HLD (hyperlipidemia)  Anemia  Acute renal failure  CKD (chronic kidney disease)  COPD (chronic obstructive pulmonary disease)  Fainting  Cardiac arrhythmia  Dizziness  Hydronephrosis  Cholecystitis  Bipolar 1 disorder  DM (diabetes mellitus)  HTN (hypertension)  Lumbar radiculopathy  Left heart failure  Reflux esophagitis  Coronary atherosclerosis  History of cardiac catheterization  No significant past surgical history          PHYSICAL EXAM:    T(C): 37.1 (11-19-17 @ 12:00), Max: 37.5 (11-18-17 @ 15:55)  HR: 99 (11-19-17 @ 12:00) (70 - 99)  BP: 138/64 (11-19-17 @ 12:00) (105/63 - 154/77)  RR: 18 (11-19-17 @ 12:00) (18 - 18)  SpO2: 97% (11-19-17 @ 12:00) (96% - 100%)  Wt(kg): --    I&O's Detail    18 Nov 2017 07:01  -  19 Nov 2017 07:00  --------------------------------------------------------  IN:    Oral Fluid: 170 mL  Total IN: 170 mL    OUT:  Total OUT: 0 mL    Total NET: 170 mL      19 Nov 2017 07:01  -  19 Nov 2017 15:53  --------------------------------------------------------  IN:    Oral Fluid: 120 mL  Total IN: 120 mL    OUT:    Other: 1000 mL  Total OUT: 1000 mL    Total NET: -880 mL          Respiratory: clear anteriorly, decreased BS at bases  Cardiovascular: S1 S2  Gastrointestinal: soft NT ND +BS  Extremities: edema   Neuro: Awake and alert    MEDICATIONS  (STANDING):  amiodarone    Tablet 200 milliGRAM(s) Oral daily  artificial  tears Solution 1 Drop(s) Both EYES four times a day  aspirin  chewable 81 milliGRAM(s) Oral daily  atorvastatin 40 milliGRAM(s) Oral at bedtime  BACItracin   Ointment 1 Application(s) Topical two times a day  busPIRone 5 milliGRAM(s) Oral two times a day  clopidogrel Tablet 75 milliGRAM(s) Oral daily  clotrimazole/betamethasone Cream 1 Application(s) Topical two times a day  epoetin georgie Injectable 13252 Unit(s) IV Push every other day  fluticasone propionate   110 MICROgram(s) HFA Inhaler 1 Puff(s) Inhalation daily  heparin  Infusion. 700 Unit(s)/Hr (7 mL/Hr) IV Continuous <Continuous>  insulin lispro (HumaLOG) corrective regimen sliding scale   SubCutaneous Before meals and at bedtime  Nephro-sophie 1 Tablet(s) Oral daily  pantoprazole   Suspension 40 milliGRAM(s) Oral before breakfast  tamsulosin 0.4 milliGRAM(s) Oral at bedtime  vancomycin  IVPB 500 milliGRAM(s) IV Intermittent once    MEDICATIONS  (PRN):  ALBUTerol    90 MICROgram(s) HFA Inhaler 2 Puff(s) Inhalation every 6 hours PRN Shortness of Breath and/or Wheezing  heparin  Injectable 6500 Unit(s) IV Push every 6 hours PRN For aPTT less than 40  heparin  Injectable 3000 Unit(s) IV Push every 6 hours PRN For aPTT between 40 - 57                            8.1    7.60  )-----------( 285      ( 19 Nov 2017 11:31 )             26.0       11-19    139  |  97  |  48<H>  ----------------------------<  121<H>  3.9   |  22  |  5.42<H>    Ca    9.6      19 Nov 2017 11:31

## 2017-11-19 NOTE — PROGRESS NOTE ADULT - PROBLEM SELECTOR PLAN 2
ESRD on HD, nephrology on board Dr. Elif Solis - HD MWF via permcath.   - s/p permacath removal 11/6, replacement on 11/9, and RUE AVF placement on 11/16, received dialysis on Sunday 11/19

## 2017-11-19 NOTE — PROGRESS NOTE ADULT - ASSESSMENT
72 male with a history of CVA, HTN, ESRD on HD. S/P sepsis, now improved.  stable dialysis via perma cath. Had right UE AVG thursday.  labs and medications reviewed. continue supportive care.  spoke to patient's dtr yesterday.  will follow.

## 2017-11-19 NOTE — PROGRESS NOTE ADULT - PROBLEM SELECTOR PLAN 3
- coumadin was held for AVF placement  - resumed Heparin gtt and bridging back to Coumadin  - follow-up daily INR

## 2017-11-20 LAB
ANION GAP SERPL CALC-SCNC: 18 MMOL/L — HIGH (ref 5–17)
APTT BLD: 41.1 SEC — HIGH (ref 27.5–37.4)
APTT BLD: 61 SEC — HIGH (ref 27.5–37.4)
APTT BLD: 65.4 SEC — HIGH (ref 27.5–37.4)
BUN SERPL-MCNC: 26 MG/DL — HIGH (ref 7–23)
CALCIUM SERPL-MCNC: 9.7 MG/DL — SIGNIFICANT CHANGE UP (ref 8.4–10.5)
CHLORIDE SERPL-SCNC: 97 MMOL/L — SIGNIFICANT CHANGE UP (ref 96–108)
CO2 SERPL-SCNC: 23 MMOL/L — SIGNIFICANT CHANGE UP (ref 22–31)
CREAT SERPL-MCNC: 3.67 MG/DL — HIGH (ref 0.5–1.3)
GLUCOSE BLDC GLUCOMTR-MCNC: 137 MG/DL — HIGH (ref 70–99)
GLUCOSE BLDC GLUCOMTR-MCNC: 141 MG/DL — HIGH (ref 70–99)
GLUCOSE BLDC GLUCOMTR-MCNC: 143 MG/DL — HIGH (ref 70–99)
GLUCOSE BLDC GLUCOMTR-MCNC: 182 MG/DL — HIGH (ref 70–99)
GLUCOSE SERPL-MCNC: 128 MG/DL — HIGH (ref 70–99)
HCT VFR BLD CALC: 29.1 % — LOW (ref 39–50)
HGB BLD-MCNC: 9 G/DL — LOW (ref 13–17)
INR BLD: 1.63 RATIO — HIGH (ref 0.88–1.16)
MCHC RBC-ENTMCNC: 28.4 PG — SIGNIFICANT CHANGE UP (ref 27–34)
MCHC RBC-ENTMCNC: 30.9 GM/DL — LOW (ref 32–36)
MCV RBC AUTO: 91.8 FL — SIGNIFICANT CHANGE UP (ref 80–100)
PLATELET # BLD AUTO: 329 K/UL — SIGNIFICANT CHANGE UP (ref 150–400)
POTASSIUM SERPL-MCNC: 4.3 MMOL/L — SIGNIFICANT CHANGE UP (ref 3.5–5.3)
POTASSIUM SERPL-SCNC: 4.3 MMOL/L — SIGNIFICANT CHANGE UP (ref 3.5–5.3)
PROTHROM AB SERPL-ACNC: 18.6 SEC — HIGH (ref 10–13.1)
RBC # BLD: 3.17 M/UL — LOW (ref 4.2–5.8)
RBC # FLD: 15.1 % — HIGH (ref 10.3–14.5)
SODIUM SERPL-SCNC: 138 MMOL/L — SIGNIFICANT CHANGE UP (ref 135–145)
WBC # BLD: 8.93 K/UL — SIGNIFICANT CHANGE UP (ref 3.8–10.5)
WBC # FLD AUTO: 8.93 K/UL — SIGNIFICANT CHANGE UP (ref 3.8–10.5)

## 2017-11-20 PROCEDURE — 99232 SBSQ HOSP IP/OBS MODERATE 35: CPT

## 2017-11-20 RX ORDER — WARFARIN SODIUM 2.5 MG/1
3 TABLET ORAL ONCE
Qty: 0 | Refills: 0 | Status: COMPLETED | OUTPATIENT
Start: 2017-11-20 | End: 2017-11-20

## 2017-11-20 RX ORDER — VANCOMYCIN HCL 1 G
500 VIAL (EA) INTRAVENOUS ONCE
Qty: 0 | Refills: 0 | Status: COMPLETED | OUTPATIENT
Start: 2017-11-21 | End: 2017-11-21

## 2017-11-20 RX ADMIN — Medication 1 DROP(S): at 11:43

## 2017-11-20 RX ADMIN — AMIODARONE HYDROCHLORIDE 200 MILLIGRAM(S): 400 TABLET ORAL at 05:56

## 2017-11-20 RX ADMIN — Medication 81 MILLIGRAM(S): at 11:44

## 2017-11-20 RX ADMIN — Medication 5 MILLIGRAM(S): at 17:48

## 2017-11-20 RX ADMIN — Medication 1 PUFF(S): at 11:44

## 2017-11-20 RX ADMIN — Medication 1 DROP(S): at 05:56

## 2017-11-20 RX ADMIN — WARFARIN SODIUM 3 MILLIGRAM(S): 2.5 TABLET ORAL at 21:00

## 2017-11-20 RX ADMIN — HEPARIN SODIUM 800 UNIT(S)/HR: 5000 INJECTION INTRAVENOUS; SUBCUTANEOUS at 16:16

## 2017-11-20 RX ADMIN — CLOTRIMAZOLE AND BETAMETHASONE DIPROPIONATE 1 APPLICATION(S): 10; .5 CREAM TOPICAL at 17:47

## 2017-11-20 RX ADMIN — Medication 1 TABLET(S): at 11:44

## 2017-11-20 RX ADMIN — Medication 1: at 22:18

## 2017-11-20 RX ADMIN — Medication 1 APPLICATION(S): at 05:56

## 2017-11-20 RX ADMIN — ATORVASTATIN CALCIUM 40 MILLIGRAM(S): 80 TABLET, FILM COATED ORAL at 21:00

## 2017-11-20 RX ADMIN — HEPARIN SODIUM 800 UNIT(S)/HR: 5000 INJECTION INTRAVENOUS; SUBCUTANEOUS at 09:30

## 2017-11-20 RX ADMIN — Medication 1 APPLICATION(S): at 17:47

## 2017-11-20 RX ADMIN — CLOTRIMAZOLE AND BETAMETHASONE DIPROPIONATE 1 APPLICATION(S): 10; .5 CREAM TOPICAL at 05:57

## 2017-11-20 RX ADMIN — Medication 1 DROP(S): at 17:47

## 2017-11-20 RX ADMIN — Medication 5 MILLIGRAM(S): at 05:57

## 2017-11-20 RX ADMIN — TAMSULOSIN HYDROCHLORIDE 0.4 MILLIGRAM(S): 0.4 CAPSULE ORAL at 21:00

## 2017-11-20 RX ADMIN — CLOPIDOGREL BISULFATE 75 MILLIGRAM(S): 75 TABLET, FILM COATED ORAL at 11:44

## 2017-11-20 RX ADMIN — PANTOPRAZOLE SODIUM 40 MILLIGRAM(S): 20 TABLET, DELAYED RELEASE ORAL at 07:40

## 2017-11-20 NOTE — PROGRESS NOTE ADULT - SUBJECTIVE AND OBJECTIVE BOX
Patient is a 72y old  Male who presents with a chief complaint of s/p cardiac cath (24 Oct 2017 15:51)      SUBJECTIVE / OVERNIGHT EVENTS:  Patient seen and examined at bedside. No acute events overnight. Unable to obtain ROS as patient non-verbal with housestaff.    MEDICATIONS  (STANDING):  amiodarone    Tablet 200 milliGRAM(s) Oral daily  artificial  tears Solution 1 Drop(s) Both EYES four times a day  aspirin  chewable 81 milliGRAM(s) Oral daily  atorvastatin 40 milliGRAM(s) Oral at bedtime  BACItracin   Ointment 1 Application(s) Topical two times a day  busPIRone 5 milliGRAM(s) Oral two times a day  clopidogrel Tablet 75 milliGRAM(s) Oral daily  clotrimazole/betamethasone Cream 1 Application(s) Topical two times a day  epoetin georgie Injectable 72914 Unit(s) IV Push every other day  fluticasone propionate   110 MICROgram(s) HFA Inhaler 1 Puff(s) Inhalation daily  heparin  Infusion. 600 Unit(s)/Hr (6 mL/Hr) IV Continuous <Continuous>  insulin lispro (HumaLOG) corrective regimen sliding scale   SubCutaneous Before meals and at bedtime  Nephro-sophie 1 Tablet(s) Oral daily  pantoprazole   Suspension 40 milliGRAM(s) Oral before breakfast  tamsulosin 0.4 milliGRAM(s) Oral at bedtime    MEDICATIONS  (PRN):  ALBUTerol    90 MICROgram(s) HFA Inhaler 2 Puff(s) Inhalation every 6 hours PRN Shortness of Breath and/or Wheezing  heparin  Injectable 6500 Unit(s) IV Push every 6 hours PRN For aPTT less than 40  heparin  Injectable 3000 Unit(s) IV Push every 6 hours PRN For aPTT between 40 - 57      Vital Signs Last 24 Hrs  T(C): 36.7 (20 Nov 2017 05:51), Max: 37.1 (19 Nov 2017 12:00)  T(F): 98 (20 Nov 2017 05:51), Max: 98.8 (19 Nov 2017 12:00)  HR: 94 (20 Nov 2017 05:51) (80 - 99)  BP: 135/80 (20 Nov 2017 05:51) (117/67 - 149/68)  BP(mean): --  RR: 18 (20 Nov 2017 05:51) (18 - 18)  SpO2: 97% (20 Nov 2017 05:51) (93% - 100%)  CAPILLARY BLOOD GLUCOSE      POCT Blood Glucose.: 145 mg/dL (19 Nov 2017 21:40)  POCT Blood Glucose.: 132 mg/dL (19 Nov 2017 16:49)  POCT Blood Glucose.: 104 mg/dL (19 Nov 2017 12:47)  POCT Blood Glucose.: 142 mg/dL (19 Nov 2017 08:19)    I&O's Summary    18 Nov 2017 07:01  -  19 Nov 2017 07:00  --------------------------------------------------------  IN: 170 mL / OUT: 0 mL / NET: 170 mL    19 Nov 2017 07:01  -  20 Nov 2017 06:00  --------------------------------------------------------  IN: 343 mL / OUT: 1000 mL / NET: -657 mL        PHYSICAL EXAM:  GENERAL: NAD, laying comfortably in bed  HEAD: NC/AT  EYES:  PERRLA, conjunctiva and sclera clear  CHEST/LUNG: Clear to auscultation bilaterally; No wheeze  HEART: Regular rate and rhythm; No murmurs, rubs, or gallops  ABDOMEN: Soft, Nontender, Nondistended; Bowel sounds present  EXTREMITIES:  No clubbing, cyanosis, or edema  PSYCH: normal mood/affect  NEURO: mild LUE decorticate posturing  SKIN: Permacath site clean/dry/intact    LABS:                        9.2    8.0   )-----------( 283      ( 19 Nov 2017 23:15 )             28.7     WBC Trend: 8.0<--, 7.60<--, 9.13<--  11-19    139  |  97  |  48<H>  ----------------------------<  121<H>  3.9   |  22  |  5.42<H>    Ca    9.6      19 Nov 2017 11:31      Creatinine Trend: 5.42<--, 3.43<--, 5.51<--, 4.11<--, 5.03<--, 3.66<--  PT/INR - ( 19 Nov 2017 14:49 )   PT: 36.9 sec;   INR: 3.19 ratio         PTT - ( 19 Nov 2017 23:04 )  PTT:42.6 sec            RADIOLOGY & ADDITIONAL TESTS:    Imaging Personally Reviewed:    Consultant(s) Notes Reviewed: Nephrology      Care Discussed with Consultants/Other Providers:    CONTACT #: [id number] Patient is a 72y old  Male who presents with a chief complaint of s/p cardiac cath (24 Oct 2017 15:51)      SUBJECTIVE / OVERNIGHT EVENTS:  Patient seen and examined at bedside. No acute events overnight. Unable to obtain ROS as patient non-verbal with housestaff.    MEDICATIONS  (STANDING):  amiodarone    Tablet 200 milliGRAM(s) Oral daily  artificial  tears Solution 1 Drop(s) Both EYES four times a day  aspirin  chewable 81 milliGRAM(s) Oral daily  atorvastatin 40 milliGRAM(s) Oral at bedtime  BACItracin   Ointment 1 Application(s) Topical two times a day  busPIRone 5 milliGRAM(s) Oral two times a day  clopidogrel Tablet 75 milliGRAM(s) Oral daily  clotrimazole/betamethasone Cream 1 Application(s) Topical two times a day  epoetin georgie Injectable 02429 Unit(s) IV Push every other day  fluticasone propionate   110 MICROgram(s) HFA Inhaler 1 Puff(s) Inhalation daily  heparin  Infusion. 600 Unit(s)/Hr (6 mL/Hr) IV Continuous <Continuous>  insulin lispro (HumaLOG) corrective regimen sliding scale   SubCutaneous Before meals and at bedtime  Nephro-sophie 1 Tablet(s) Oral daily  pantoprazole   Suspension 40 milliGRAM(s) Oral before breakfast  tamsulosin 0.4 milliGRAM(s) Oral at bedtime    MEDICATIONS  (PRN):  ALBUTerol    90 MICROgram(s) HFA Inhaler 2 Puff(s) Inhalation every 6 hours PRN Shortness of Breath and/or Wheezing  heparin  Injectable 6500 Unit(s) IV Push every 6 hours PRN For aPTT less than 40  heparin  Injectable 3000 Unit(s) IV Push every 6 hours PRN For aPTT between 40 - 57      Vital Signs Last 24 Hrs  T(C): 36.7 (20 Nov 2017 05:51), Max: 37.1 (19 Nov 2017 12:00)  T(F): 98 (20 Nov 2017 05:51), Max: 98.8 (19 Nov 2017 12:00)  HR: 94 (20 Nov 2017 05:51) (80 - 99)  BP: 135/80 (20 Nov 2017 05:51) (117/67 - 149/68)  BP(mean): --  RR: 18 (20 Nov 2017 05:51) (18 - 18)  SpO2: 97% (20 Nov 2017 05:51) (93% - 100%)  CAPILLARY BLOOD GLUCOSE      POCT Blood Glucose.: 145 mg/dL (19 Nov 2017 21:40)  POCT Blood Glucose.: 132 mg/dL (19 Nov 2017 16:49)  POCT Blood Glucose.: 104 mg/dL (19 Nov 2017 12:47)  POCT Blood Glucose.: 142 mg/dL (19 Nov 2017 08:19)    I&O's Summary    18 Nov 2017 07:01  -  19 Nov 2017 07:00  --------------------------------------------------------  IN: 170 mL / OUT: 0 mL / NET: 170 mL    19 Nov 2017 07:01  -  20 Nov 2017 06:00  --------------------------------------------------------  IN: 343 mL / OUT: 1000 mL / NET: -657 mL        PHYSICAL EXAM:  GENERAL: NAD, laying comfortably in bed  HEAD: NC/AT  EYES:  PERRLA, conjunctiva and sclera clear  CHEST/LUNG: Clear to auscultation bilaterally; No wheeze  HEART: Regular rate and rhythm; No murmurs, rubs, or gallops  ABDOMEN: Soft, Nontender, Nondistended; Bowel sounds present  EXTREMITIES:  No clubbing, cyanosis, or edema  PSYCH: normal mood/affect  NEURO: mild LUE decorticate posturing  SKIN: Permacath site clean/dry/intact    LABS:                                   9.0    8.93  )-----------( 329      ( 20 Nov 2017 06:29 )             29.1     WBC Trend: 8.93<--, 8.0<--, 7.60<--, 9.13<--  11-20    138  |  97  |  26<H>  ----------------------------<  128<H>  4.3   |  23  |  3.67<H>    Ca    9.7      20 Nov 2017 06:29    Creatinine Trend: 3.67<--, 5.42<--, 3.43<--, 5.51<--, 4.11<--, 5.03<--, 3.66<--  PT/INR - ( 19 Nov 2017 14:49 )   PT: 36.9 sec;   INR: 3.19 ratio         PTT - ( 19 Nov 2017 23:04 )  PTT:42.6 sec            RADIOLOGY & ADDITIONAL TESTS:    Imaging Personally Reviewed:    Consultant(s) Notes Reviewed: Nephrology      Care Discussed with Consultants/Other Providers:    CONTACT #: [id number] Patient is a 72y old  Male who presents with a chief complaint of s/p cardiac cath (24 Oct 2017 15:51)      SUBJECTIVE / OVERNIGHT EVENTS:  Patient seen and examined at bedside. No acute events overnight. Unable to obtain ROS as patient non-verbal with housestaff.    MEDICATIONS  (STANDING):  amiodarone    Tablet 200 milliGRAM(s) Oral daily  artificial  tears Solution 1 Drop(s) Both EYES four times a day  aspirin  chewable 81 milliGRAM(s) Oral daily  atorvastatin 40 milliGRAM(s) Oral at bedtime  BACItracin   Ointment 1 Application(s) Topical two times a day  busPIRone 5 milliGRAM(s) Oral two times a day  clopidogrel Tablet 75 milliGRAM(s) Oral daily  clotrimazole/betamethasone Cream 1 Application(s) Topical two times a day  epoetin georgie Injectable 77303 Unit(s) IV Push every other day  fluticasone propionate   110 MICROgram(s) HFA Inhaler 1 Puff(s) Inhalation daily  heparin  Infusion. 600 Unit(s)/Hr (6 mL/Hr) IV Continuous <Continuous>  insulin lispro (HumaLOG) corrective regimen sliding scale   SubCutaneous Before meals and at bedtime  Nephro-sophie 1 Tablet(s) Oral daily  pantoprazole   Suspension 40 milliGRAM(s) Oral before breakfast  tamsulosin 0.4 milliGRAM(s) Oral at bedtime    MEDICATIONS  (PRN):  ALBUTerol    90 MICROgram(s) HFA Inhaler 2 Puff(s) Inhalation every 6 hours PRN Shortness of Breath and/or Wheezing  heparin  Injectable 6500 Unit(s) IV Push every 6 hours PRN For aPTT less than 40  heparin  Injectable 3000 Unit(s) IV Push every 6 hours PRN For aPTT between 40 - 57      Vital Signs Last 24 Hrs  T(C): 36.7 (20 Nov 2017 05:51), Max: 37.1 (19 Nov 2017 12:00)  T(F): 98 (20 Nov 2017 05:51), Max: 98.8 (19 Nov 2017 12:00)  HR: 94 (20 Nov 2017 05:51) (80 - 99)  BP: 135/80 (20 Nov 2017 05:51) (117/67 - 149/68)  BP(mean): --  RR: 18 (20 Nov 2017 05:51) (18 - 18)  SpO2: 97% (20 Nov 2017 05:51) (93% - 100%)  CAPILLARY BLOOD GLUCOSE    POCT  Blood Glucose (11.20.17 @ 08:10)    POCT Blood Glucose.: 141 mg/dL  POCT Blood Glucose.: 145 mg/dL (19 Nov 2017 21:40)  POCT Blood Glucose.: 132 mg/dL (19 Nov 2017 16:49)  POCT Blood Glucose.: 104 mg/dL (19 Nov 2017 12:47)  POCT Blood Glucose.: 142 mg/dL (19 Nov 2017 08:19)    I&O's Summary    18 Nov 2017 07:01  -  19 Nov 2017 07:00  --------------------------------------------------------  IN: 170 mL / OUT: 0 mL / NET: 170 mL    19 Nov 2017 07:01  -  20 Nov 2017 06:00  --------------------------------------------------------  IN: 343 mL / OUT: 1000 mL / NET: -657 mL        PHYSICAL EXAM:  GENERAL: NAD, laying comfortably in bed  HEAD: NC/AT  EYES:  PERRLA, conjunctiva and sclera clear  CHEST/LUNG: Clear to auscultation bilaterally; No wheeze  HEART: Regular rate and rhythm; No murmurs, rubs, or gallops  ABDOMEN: Soft, Nontender, Nondistended; Bowel sounds present  EXTREMITIES:  No clubbing, cyanosis, or edema; Ace bandage overlying RUE  PSYCH: normal mood/affect  NEURO: mild LUE decorticate posturing  SKIN: Permacath site clean/dry/intact    LABS:                                   9.0    8.93  )-----------( 329      ( 20 Nov 2017 06:29 )             29.1     WBC Trend: 8.93<--, 8.0<--, 7.60<--, 9.13<--  11-20    138  |  97  |  26<H>  ----------------------------<  128<H>  4.3   |  23  |  3.67<H>    Ca    9.7      20 Nov 2017 06:29    Creatinine Trend: 3.67<--, 5.42<--, 3.43<--, 5.51<--, 4.11<--, 5.03<--, 3.66<--  Prothrombin Time and INR, Plasma in AM (11.20.17 @ 06:29)    Prothrombin Time, Plasma: 18.6 sec    INR: 1.63: Recommended ranges for therapeutic INR:    2.0-3.0 for most medical and surgical thromboembolic states    2.0-3.0 for atrial fibrillation    2.0-3.0 for bileaflet mechanical valve in aortic position    2.5-3.5 for mechanical heart valves    Chest 2004;126:[id number]  The presence of direct thrombin inhibitors (argatroban, refludan)  may falsely increase results. ratio     PTT - ( 19 Nov 2017 23:04 )  PTT:42.6 sec            RADIOLOGY & ADDITIONAL TESTS:    Imaging Personally Reviewed:    Consultant(s) Notes Reviewed: Nephrology      Care Discussed with Consultants/Other Providers:    CONTACT #: [id number] Patient is a 72y old  Male who presents with a chief complaint of s/p cardiac cath (24 Oct 2017 15:51)      SUBJECTIVE / OVERNIGHT EVENTS:  Patient seen and examined at bedside. No acute events overnight. Unable to obtain ROS as patient non-verbal with housestaff.    MEDICATIONS  (STANDING):  amiodarone    Tablet 200 milliGRAM(s) Oral daily  artificial  tears Solution 1 Drop(s) Both EYES four times a day  aspirin  chewable 81 milliGRAM(s) Oral daily  atorvastatin 40 milliGRAM(s) Oral at bedtime  BACItracin   Ointment 1 Application(s) Topical two times a day  busPIRone 5 milliGRAM(s) Oral two times a day  clopidogrel Tablet 75 milliGRAM(s) Oral daily  clotrimazole/betamethasone Cream 1 Application(s) Topical two times a day  epoetin georgie Injectable 66420 Unit(s) IV Push every other day  fluticasone propionate   110 MICROgram(s) HFA Inhaler 1 Puff(s) Inhalation daily  heparin  Infusion. 600 Unit(s)/Hr (6 mL/Hr) IV Continuous <Continuous>  insulin lispro (HumaLOG) corrective regimen sliding scale   SubCutaneous Before meals and at bedtime  Nephro-sophie 1 Tablet(s) Oral daily  pantoprazole   Suspension 40 milliGRAM(s) Oral before breakfast  tamsulosin 0.4 milliGRAM(s) Oral at bedtime    MEDICATIONS  (PRN):  ALBUTerol    90 MICROgram(s) HFA Inhaler 2 Puff(s) Inhalation every 6 hours PRN Shortness of Breath and/or Wheezing  heparin  Injectable 6500 Unit(s) IV Push every 6 hours PRN For aPTT less than 40  heparin  Injectable 3000 Unit(s) IV Push every 6 hours PRN For aPTT between 40 - 57      Vital Signs Last 24 Hrs  T(C): 36.7 (20 Nov 2017 05:51), Max: 37.1 (19 Nov 2017 12:00)  T(F): 98 (20 Nov 2017 05:51), Max: 98.8 (19 Nov 2017 12:00)  HR: 94 (20 Nov 2017 05:51) (80 - 99)  BP: 135/80 (20 Nov 2017 05:51) (117/67 - 149/68)  BP(mean): --  RR: 18 (20 Nov 2017 05:51) (18 - 18)  SpO2: 97% (20 Nov 2017 05:51) (93% - 100%)  CAPILLARY BLOOD GLUCOSE    POCT  Blood Glucose (11.20.17 @ 08:10)    POCT Blood Glucose.: 141 mg/dL  POCT Blood Glucose.: 145 mg/dL (19 Nov 2017 21:40)  POCT Blood Glucose.: 132 mg/dL (19 Nov 2017 16:49)  POCT Blood Glucose.: 104 mg/dL (19 Nov 2017 12:47)  POCT Blood Glucose.: 142 mg/dL (19 Nov 2017 08:19)    I&O's Summary    18 Nov 2017 07:01  -  19 Nov 2017 07:00  --------------------------------------------------------  IN: 170 mL / OUT: 0 mL / NET: 170 mL    19 Nov 2017 07:01  -  20 Nov 2017 06:00  --------------------------------------------------------  IN: 343 mL / OUT: 1000 mL / NET: -657 mL        PHYSICAL EXAM:  GENERAL: NAD, laying comfortably in bed  HEAD: NC/AT  EYES:  PERRLA, conjunctiva and sclera clear  CHEST/LUNG: Clear to auscultation bilaterally; No wheeze  HEART: Regular rate and rhythm; No murmurs, rubs, or gallops  ABDOMEN: Soft, Nontender, Nondistended; Bowel sounds present  EXTREMITIES:  No clubbing, cyanosis, or edema; Ace bandage overlying RUE  PSYCH: normal mood/affect  NEURO: mild LUE decorticate posturing  SKIN: Permacath site clean/dry/intact    LABS:                                   9.0    8.93  )-----------( 329      ( 20 Nov 2017 06:29 )             29.1     WBC Trend: 8.93<--, 8.0<--, 7.60<--, 9.13<--  11-20    138  |  97  |  26<H>  ----------------------------<  128<H>  4.3   |  23  |  3.67<H>    Ca    9.7      20 Nov 2017 06:29    Creatinine Trend: 3.67<--, 5.42<--, 3.43<--, 5.51<--, 4.11<--, 5.03<--, 3.66<--  Prothrombin Time and INR, Plasma in AM (11.20.17 @ 06:29)    Prothrombin Time, Plasma: 18.6 sec    INR: 1.63: Recommended ranges for therapeutic INR:    2.0-3.0 for most medical and surgical thromboembolic states    2.0-3.0 for atrial fibrillation    2.0-3.0 for bileaflet mechanical valve in aortic position    2.5-3.5 for mechanical heart valves    Chest 2004;126:[id number]  The presence of direct thrombin inhibitors (argatroban, refludan)  may falsely increase results. ratio     Activated Partial Thromboplastin Time (11.20.17 @ 09:01)    Activated Partial Thromboplastin Time: 65.4: The recommended therapeutic heparin range (full dose) is 58-99 seconds.  Recommended therapeutic Argatroban range is 1.5 to 3.0 times the baseline  APTT value, not to exceed 100 seconds. Recommended therapeutic Refludan  range is 1.5 to 2.5 times thebaseline APTT. sec    RADIOLOGY & ADDITIONAL TESTS:    Imaging Personally Reviewed:    Consultant(s) Notes Reviewed: Nephrology      Care Discussed with Consultants/Other Providers:    CONTACT #: [id number] Patient is a 72y old  Male who presents with a chief complaint of s/p cardiac cath (24 Oct 2017 15:51)    SUBJECTIVE / OVERNIGHT EVENTS:  Patient seen and examined at bedside. No acute events overnight. Unable to obtain ROS as patient non-verbal with housestaff.    MEDICATIONS  (STANDING):  amiodarone    Tablet 200 milliGRAM(s) Oral daily  artificial  tears Solution 1 Drop(s) Both EYES four times a day  aspirin  chewable 81 milliGRAM(s) Oral daily  atorvastatin 40 milliGRAM(s) Oral at bedtime  BACItracin   Ointment 1 Application(s) Topical two times a day  busPIRone 5 milliGRAM(s) Oral two times a day  clopidogrel Tablet 75 milliGRAM(s) Oral daily  clotrimazole/betamethasone Cream 1 Application(s) Topical two times a day  epoetin georgie Injectable 00492 Unit(s) IV Push every other day  fluticasone propionate   110 MICROgram(s) HFA Inhaler 1 Puff(s) Inhalation daily  heparin  Infusion. 600 Unit(s)/Hr (6 mL/Hr) IV Continuous <Continuous>  insulin lispro (HumaLOG) corrective regimen sliding scale   SubCutaneous Before meals and at bedtime  Nephro-sophie 1 Tablet(s) Oral daily  pantoprazole   Suspension 40 milliGRAM(s) Oral before breakfast  tamsulosin 0.4 milliGRAM(s) Oral at bedtime    MEDICATIONS  (PRN):  ALBUTerol    90 MICROgram(s) HFA Inhaler 2 Puff(s) Inhalation every 6 hours PRN Shortness of Breath and/or Wheezing  heparin  Injectable 6500 Unit(s) IV Push every 6 hours PRN For aPTT less than 40  heparin  Injectable 3000 Unit(s) IV Push every 6 hours PRN For aPTT between 40 - 57    Vital Signs Last 24 Hrs  T(C): 36.7 (20 Nov 2017 05:51), Max: 37.1 (19 Nov 2017 12:00)  T(F): 98 (20 Nov 2017 05:51), Max: 98.8 (19 Nov 2017 12:00)  HR: 94 (20 Nov 2017 05:51) (80 - 99)  BP: 135/80 (20 Nov 2017 05:51) (117/67 - 149/68)  RR: 18 (20 Nov 2017 05:51) (18 - 18)  SpO2: 97% (20 Nov 2017 05:51) (93% - 100%)    CAPILLARY BLOOD GLUCOSE  POCT  Blood Glucose (11.20.17 @ 08:10)  POCT Blood Glucose.: 141 mg/dL  POCT Blood Glucose.: 145 mg/dL (19 Nov 2017 21:40)  POCT Blood Glucose.: 132 mg/dL (19 Nov 2017 16:49)  POCT Blood Glucose.: 104 mg/dL (19 Nov 2017 12:47)  POCT Blood Glucose.: 142 mg/dL (19 Nov 2017 08:19)    I&O's Summary  18 Nov 2017 07:01  -  19 Nov 2017 07:00  --------------------------------------------------------  IN: 170 mL / OUT: 0 mL / NET: 170 mL    19 Nov 2017 07:01  -  20 Nov 2017 06:00  --------------------------------------------------------  IN: 343 mL / OUT: 1000 mL / NET: -657 mL    PHYSICAL EXAM:  GENERAL: NAD, laying comfortably in bed  HEAD: NC/AT  EYES:  PERRLA, conjunctiva and sclera clear  CHEST/LUNG: Clear to auscultation bilaterally; No wheeze  HEART: Regular rate and rhythm; No murmurs, rubs, or gallops  ABDOMEN: Soft, Nontender, Nondistended; Bowel sounds present  EXTREMITIES:  No clubbing, cyanosis, or edema; Ace bandage overlying RUE  PSYCH: normal mood/affect  NEURO: mild LUE decorticate posturing  SKIN: Permacath site clean/dry/intact    LABS:                               9.0    8.93  )-----------( 329      ( 20 Nov 2017 06:29 )             29.1     WBC Trend: 8.93<--, 8.0<--, 7.60<--, 9.13<--  11-20    138  |  97  |  26<H>  ----------------------------<  128<H>  4.3   |  23  |  3.67<H>    Ca    9.7      20 Nov 2017 06:29    Creatinine Trend: 3.67<--, 5.42<--, 3.43<--, 5.51<--, 4.11<--, 5.03<--, 3.66<--  Prothrombin Time and INR, Plasma in AM (11.20.17 @ 06:29)    Prothrombin Time, Plasma: 18.6 sec    INR: 1.63: Recommended ranges for therapeutic INR:    2.0-3.0 for most medical and surgical thromboembolic states    2.0-3.0 for atrial fibrillation    2.0-3.0 for bileaflet mechanical valve in aortic position    2.5-3.5 for mechanical heart valves    Chest 2004;126:[id number]  The presence of direct thrombin inhibitors (argatroban, refludan)  may falsely increase results. ratio     Activated Partial Thromboplastin Time (11.20.17 @ 09:01)    Activated Partial Thromboplastin Time: 65.4: The recommended therapeutic heparin range (full dose) is 58-99 seconds.  Recommended therapeutic Argatroban range is 1.5 to 3.0 times the baseline  APTT value, not to exceed 100 seconds. Recommended therapeutic Refludan  range is 1.5 to 2.5 times thebaseline APTT. sec    RADIOLOGY & ADDITIONAL TESTS:    Consultant(s) Notes Reviewed: Nephrology      CONTACT #: [id number]

## 2017-11-20 NOTE — PROGRESS NOTE ADULT - PROBLEM SELECTOR PLAN 3
- coumadin was held for AVF placement  - resumed Heparin gtt and bridging back to Coumadin  - monitor INR daily

## 2017-11-20 NOTE — PROGRESS NOTE ADULT - SUBJECTIVE AND OBJECTIVE BOX
No distress, non-verbal    Vital Signs Last 24 Hrs  T(C): 36.7 (11-20-17 @ 11:16), Max: 36.8 (11-19-17 @ 20:27)  T(F): 98 (11-20-17 @ 11:16), Max: 98.3 (11-19-17 @ 20:27)  HR: 96 (11-20-17 @ 11:16) (80 - 99)  BP: 139/79 (11-20-17 @ 11:16) (119/79 - 149/68)  BP(mean): --  RR: 18 (11-20-17 @ 11:16) (18 - 18)  SpO2: 100% (11-20-17 @ 11:16) (93% - 100%)    Respiratory: b/l air entry, clear  Cardiovascular: S1 S2 regular  Gastrointestinal: soft, NT, BS present  Extremities: no edema     ALBUTerol    90 MICROgram(s) HFA Inhaler 2 Puff(s) Inhalation every 6 hours PRN  amiodarone    Tablet 200 milliGRAM(s) Oral daily  artificial  tears Solution 1 Drop(s) Both EYES four times a day  aspirin  chewable 81 milliGRAM(s) Oral daily  atorvastatin 40 milliGRAM(s) Oral at bedtime  BACItracin   Ointment 1 Application(s) Topical two times a day  busPIRone 5 milliGRAM(s) Oral two times a day  clopidogrel Tablet 75 milliGRAM(s) Oral daily  clotrimazole/betamethasone Cream 1 Application(s) Topical two times a day  epoetin georgie Injectable 25624 Unit(s) IV Push every other day  fluticasone propionate   110 MICROgram(s) HFA Inhaler 1 Puff(s) Inhalation daily  heparin  Infusion. 600 Unit(s)/Hr IV Continuous <Continuous>  heparin  Injectable 6500 Unit(s) IV Push every 6 hours PRN  heparin  Injectable 3000 Unit(s) IV Push every 6 hours PRN  insulin lispro (HumaLOG) corrective regimen sliding scale   SubCutaneous Before meals and at bedtime  Nephro-sophie 1 Tablet(s) Oral daily  pantoprazole   Suspension 40 milliGRAM(s) Oral before breakfast  tamsulosin 0.4 milliGRAM(s) Oral at bedtime  warfarin 3 milliGRAM(s) Oral once                          9.0    8.93  )-----------( 329      ( 20 Nov 2017 06:29 )             29.1     20 Nov 2017 06:29    138    |  97     |  26     ----------------------------<  128    4.3     |  23     |  3.67     Ca    9.7        20 Nov 2017 06:29    Assessment and Recommendation:     ESRD on HD  Adm w/bacteremia, initially thought to be contam, but repeat bld cx on 10/24 and 11/2 positive for CNS  Perm Cath d/c-d after HD 11/6  F/u blood cx are negative  S/p new perm cath 10/9, s/p AVG 11/16  HD in am  D/c planning to [facility]

## 2017-11-20 NOTE — PROGRESS NOTE ADULT - ATTENDING COMMENTS
Pt w/ INR > 3 yesterday, and Coumadin not dosed thereafter, w/ continuation of IV Heparin, with return to subtherapeutic INR. Will dose Coumadin, and continue IV Heparin until therapeutic for now given increased risk for thrombosis d/t functional quadriplegia. Discharge planning underway, with placement pending at rehab w/ resumption of thrice weekly HD sessions.

## 2017-11-20 NOTE — PROGRESS NOTE ADULT - PROBLEM SELECTOR PLAN 2
ESRD on HD, nephrology on board Dr. Elif Solis - HD 3x/wk via permcath.   - s/p permacath removal 11/6, replacement on 11/9, and RUE AVF placement on 11/16, received dialysis on Sunday 11/19

## 2017-11-20 NOTE — PROGRESS NOTE ADULT - ASSESSMENT
71 yo with DM,ESRD recent HD,CVA ,non verbal.Recent bacteremia,pyelo s/p antimicrobials  CNS bacteremia-?HD cath related  s/p replacement  Repeat Cx negative  Continue IV vanco till 11/23-  Other plan per primary  Will Follow.  Beeper [id number]-twmn/afterhours/No response-[phone]

## 2017-11-20 NOTE — PROGRESS NOTE ADULT - I WAS PHYSICALLY PRESENT FOR THE KEY PORTIONS OF THE EVALUATION AND MANAGEMENT (E/M) SERVICE PROVIDED.  I AGREE WITH THE ABOVE HISTORY, PHYSICAL, AND PLAN WHICH I HAVE REVIEWED AND EDITED WHERE APPROPRIATE

## 2017-11-20 NOTE — PROGRESS NOTE ADULT - SUBJECTIVE AND OBJECTIVE BOX
Patient is a 72y old  Male who presents with a chief complaint of s/p cardiac cath (24 Oct 2017 15:51)    Being followed by ID for bacteremia    Interval history:  No acute events      ROS:  Not obtainable    Antimicrobials:Vanco post HD  Last dose 11/19      Other medications reviewed    Vital Signs Last 24 Hrs  T(C): 36.7 (11-20-17 @ 11:16), Max: 37.1 (11-19-17 @ 12:00)  T(F): 98 (11-20-17 @ 11:16), Max: 98.8 (11-19-17 @ 12:00)  HR: 96 (11-20-17 @ 11:16) (80 - 99)  BP: 139/79 (11-20-17 @ 11:16) (119/79 - 149/68)  BP(mean): --  RR: 18 (11-20-17 @ 11:16) (18 - 18)  SpO2: 100% (11-20-17 @ 11:16) (93% - 100%)    Physical Exam:    Constitutional well preserved,comfortable,pleasant    HEENT PERRLA EOMI,No pallor or icterus    No oral exudate or erythema    Neck supple no JVD or LN    R SC HD cath no erythema or tenderness  Left arm AVF no erythema ,tenderness     Chest Good AE,CTA    CVS RRR S1 S2 WNl No murmur or rub or gallop    Abd soft BS normal No tenderness no masses    Ext No cyanosis clubbing or edema    IV site no erythema tenderness or discharge    Joints no swelling or LOM    CNS  non verbal    Lab Data:                          9.0    8.93  )-----------( 329      ( 20 Nov 2017 06:29 )             29.1       11-20    138  |  97  |  26<H>  ----------------------------<  128<H>  4.3   |  23  |  3.67<H>    Ca    9.7      20 Nov 2017 06:29

## 2017-11-21 VITALS
HEART RATE: 87 BPM | SYSTOLIC BLOOD PRESSURE: 123 MMHG | DIASTOLIC BLOOD PRESSURE: 75 MMHG | OXYGEN SATURATION: 100 % | TEMPERATURE: 98 F | RESPIRATION RATE: 18 BRPM

## 2017-11-21 DIAGNOSIS — L89.90 PRESSURE ULCER OF UNSPECIFIED SITE, UNSPECIFIED STAGE: ICD-10-CM

## 2017-11-21 LAB
ANION GAP SERPL CALC-SCNC: 20 MMOL/L — HIGH (ref 5–17)
APTT BLD: 70.6 SEC — HIGH (ref 27.5–37.4)
BUN SERPL-MCNC: 44 MG/DL — HIGH (ref 7–23)
CALCIUM SERPL-MCNC: 9.4 MG/DL — SIGNIFICANT CHANGE UP (ref 8.4–10.5)
CHLORIDE SERPL-SCNC: 97 MMOL/L — SIGNIFICANT CHANGE UP (ref 96–108)
CO2 SERPL-SCNC: 24 MMOL/L — SIGNIFICANT CHANGE UP (ref 22–31)
CREAT SERPL-MCNC: 5.64 MG/DL — HIGH (ref 0.5–1.3)
GLUCOSE BLDC GLUCOMTR-MCNC: 150 MG/DL — HIGH (ref 70–99)
GLUCOSE BLDC GLUCOMTR-MCNC: 160 MG/DL — HIGH (ref 70–99)
GLUCOSE SERPL-MCNC: 145 MG/DL — HIGH (ref 70–99)
HAV IGM SER-ACNC: SIGNIFICANT CHANGE UP
HBV CORE IGM SER-ACNC: SIGNIFICANT CHANGE UP
HBV SURFACE AG SER-ACNC: SIGNIFICANT CHANGE UP
HCT VFR BLD CALC: 28.8 % — LOW (ref 39–50)
HCV AB S/CO SERPL IA: 0.3 S/CO — SIGNIFICANT CHANGE UP
HCV AB SERPL-IMP: SIGNIFICANT CHANGE UP
HGB BLD-MCNC: 8.7 G/DL — LOW (ref 13–17)
INR BLD: 1.71 RATIO — HIGH (ref 0.88–1.16)
MCHC RBC-ENTMCNC: 27.6 PG — SIGNIFICANT CHANGE UP (ref 27–34)
MCHC RBC-ENTMCNC: 30.2 GM/DL — LOW (ref 32–36)
MCV RBC AUTO: 91.4 FL — SIGNIFICANT CHANGE UP (ref 80–100)
PLATELET # BLD AUTO: 334 K/UL — SIGNIFICANT CHANGE UP (ref 150–400)
POTASSIUM SERPL-MCNC: 4.4 MMOL/L — SIGNIFICANT CHANGE UP (ref 3.5–5.3)
POTASSIUM SERPL-SCNC: 4.4 MMOL/L — SIGNIFICANT CHANGE UP (ref 3.5–5.3)
PROTHROM AB SERPL-ACNC: 18.7 SEC — HIGH (ref 9.8–12.7)
RBC # BLD: 3.15 M/UL — LOW (ref 4.2–5.8)
RBC # FLD: 15.7 % — HIGH (ref 10.3–14.5)
SODIUM SERPL-SCNC: 141 MMOL/L — SIGNIFICANT CHANGE UP (ref 135–145)
WBC # BLD: 7.58 K/UL — SIGNIFICANT CHANGE UP (ref 3.8–10.5)
WBC # FLD AUTO: 7.58 K/UL — SIGNIFICANT CHANGE UP (ref 3.8–10.5)

## 2017-11-21 PROCEDURE — 99239 HOSP IP/OBS DSCHRG MGMT >30: CPT

## 2017-11-21 PROCEDURE — 99232 SBSQ HOSP IP/OBS MODERATE 35: CPT

## 2017-11-21 RX ORDER — CLOTRIMAZOLE AND BETAMETHASONE DIPROPIONATE 10; .5 MG/G; MG/G
1 CREAM TOPICAL
Qty: 0 | Refills: 0 | COMMUNITY
Start: 2017-11-21

## 2017-11-21 RX ORDER — ASPIRIN/CALCIUM CARB/MAGNESIUM 324 MG
1 TABLET ORAL
Qty: 0 | Refills: 0 | COMMUNITY
Start: 2017-11-21

## 2017-11-21 RX ORDER — ATORVASTATIN CALCIUM 80 MG/1
1 TABLET, FILM COATED ORAL
Qty: 0 | Refills: 0 | COMMUNITY
Start: 2017-11-21

## 2017-11-21 RX ORDER — ALBUTEROL 90 UG/1
2 AEROSOL, METERED ORAL
Qty: 0 | Refills: 0 | COMMUNITY

## 2017-11-21 RX ORDER — PANTOPRAZOLE SODIUM 20 MG/1
1 TABLET, DELAYED RELEASE ORAL
Qty: 0 | Refills: 0 | COMMUNITY
Start: 2017-11-21

## 2017-11-21 RX ORDER — SITAGLIPTIN 50 MG/1
1 TABLET, FILM COATED ORAL
Qty: 0 | Refills: 0 | COMMUNITY

## 2017-11-21 RX ORDER — ASPIRIN/CALCIUM CARB/MAGNESIUM 324 MG
1 TABLET ORAL
Qty: 0 | Refills: 0 | COMMUNITY

## 2017-11-21 RX ORDER — INSULIN LISPRO 100/ML
0 VIAL (ML) SUBCUTANEOUS
Qty: 0 | Refills: 0 | COMMUNITY
Start: 2017-11-21

## 2017-11-21 RX ORDER — TAMSULOSIN HYDROCHLORIDE 0.4 MG/1
1 CAPSULE ORAL
Qty: 0 | Refills: 0 | COMMUNITY
Start: 2017-11-21

## 2017-11-21 RX ORDER — WARFARIN SODIUM 2.5 MG/1
3.5 TABLET ORAL ONCE
Qty: 0 | Refills: 0 | Status: COMPLETED | OUTPATIENT
Start: 2017-11-21 | End: 2017-11-21

## 2017-11-21 RX ORDER — FLUTICASONE PROPIONATE 220 MCG
1 AEROSOL WITH ADAPTER (GRAM) INHALATION
Qty: 0 | Refills: 0 | COMMUNITY
Start: 2017-11-21

## 2017-11-21 RX ORDER — TAMSULOSIN HYDROCHLORIDE 0.4 MG/1
1 CAPSULE ORAL
Qty: 0 | Refills: 0 | COMMUNITY

## 2017-11-21 RX ORDER — BACITRACIN ZINC 500 UNIT/G
1 OINTMENT IN PACKET (EA) TOPICAL
Qty: 0 | Refills: 0 | COMMUNITY
Start: 2017-11-21

## 2017-11-21 RX ORDER — WARFARIN SODIUM 2.5 MG/1
1 TABLET ORAL
Qty: 0 | Refills: 0 | COMMUNITY

## 2017-11-21 RX ORDER — WARFARIN SODIUM 2.5 MG/1
3.5 TABLET ORAL ONCE
Qty: 0 | Refills: 0 | Status: DISCONTINUED | OUTPATIENT
Start: 2017-11-21 | End: 2017-11-21

## 2017-11-21 RX ORDER — AMIODARONE HYDROCHLORIDE 400 MG/1
1 TABLET ORAL
Qty: 0 | Refills: 0 | COMMUNITY
Start: 2017-11-21

## 2017-11-21 RX ORDER — ERYTHROPOIETIN 10000 [IU]/ML
10000 INJECTION, SOLUTION INTRAVENOUS; SUBCUTANEOUS
Qty: 0 | Refills: 0 | COMMUNITY
Start: 2017-11-21

## 2017-11-21 RX ORDER — CLOPIDOGREL BISULFATE 75 MG/1
1 TABLET, FILM COATED ORAL
Qty: 0 | Refills: 0 | COMMUNITY
Start: 2017-11-21

## 2017-11-21 RX ADMIN — Medication 5 MILLIGRAM(S): at 06:33

## 2017-11-21 RX ADMIN — Medication 81 MILLIGRAM(S): at 14:36

## 2017-11-21 RX ADMIN — HEPARIN SODIUM 800 UNIT(S)/HR: 5000 INJECTION INTRAVENOUS; SUBCUTANEOUS at 09:07

## 2017-11-21 RX ADMIN — Medication 1 TABLET(S): at 14:39

## 2017-11-21 RX ADMIN — Medication 100 MILLIGRAM(S): at 14:33

## 2017-11-21 RX ADMIN — CLOTRIMAZOLE AND BETAMETHASONE DIPROPIONATE 1 APPLICATION(S): 10; .5 CREAM TOPICAL at 06:34

## 2017-11-21 RX ADMIN — PANTOPRAZOLE SODIUM 40 MILLIGRAM(S): 20 TABLET, DELAYED RELEASE ORAL at 06:33

## 2017-11-21 RX ADMIN — Medication 1 APPLICATION(S): at 06:33

## 2017-11-21 RX ADMIN — Medication 1 DROP(S): at 14:38

## 2017-11-21 RX ADMIN — Medication 1 DROP(S): at 06:34

## 2017-11-21 RX ADMIN — Medication 1 PUFF(S): at 14:39

## 2017-11-21 RX ADMIN — WARFARIN SODIUM 3.5 MILLIGRAM(S): 2.5 TABLET ORAL at 14:36

## 2017-11-21 RX ADMIN — ERYTHROPOIETIN 10000 UNIT(S): 10000 INJECTION, SOLUTION INTRAVENOUS; SUBCUTANEOUS at 09:19

## 2017-11-21 RX ADMIN — CLOPIDOGREL BISULFATE 75 MILLIGRAM(S): 75 TABLET, FILM COATED ORAL at 14:37

## 2017-11-21 RX ADMIN — Medication 1: at 14:21

## 2017-11-21 RX ADMIN — Medication 1 DROP(S): at 00:52

## 2017-11-21 RX ADMIN — AMIODARONE HYDROCHLORIDE 200 MILLIGRAM(S): 400 TABLET ORAL at 06:33

## 2017-11-21 NOTE — PROGRESS NOTE ADULT - PROVIDER SPECIALTY LIST ADULT
Hospitalist
Infectious Disease
Internal Medicine
Intervent Radiology
Nephrology
Surgery
Vascular Surgery
Wound Care
Wound Care
Infectious Disease
Internal Medicine
Intervent Radiology
Nephrology
Nephrology
Internal Medicine

## 2017-11-21 NOTE — PROGRESS NOTE ADULT - PROBLEM SELECTOR PLAN 2
ESRD on HD, nephrology on board Dr. Elif Solis - HD 3x/wk via permcath.   - s/p permacath removal 11/6, replacement on 11/9, and RUE AVF placement.  -having HD today. ESRD on HD, nephrology on board Dr. Elif Solis - HD 3x/wk via permcath.   - s/p permacath removal 11/6, replacement on 11/9, and RUE AVF placement.  -having HD today  -As per nephrology, next HD session will be on Friday

## 2017-11-21 NOTE — PROGRESS NOTE ADULT - SUBJECTIVE AND OBJECTIVE BOX
Patient is a 72y old  Male who presents with a chief complaint of Hypotension (24 Oct 2017 15:51)      SUBJECTIVE / OVERNIGHT EVENTS: Patient is mute and nonverbal, did not answer to any question. As per HD nurse he was much more awake and comfortable, than other days.    ROS:  All other review of systems- can not be obtained due to current condition.    Allergies    No Known Allergies    Intolerances        MEDICATIONS  (STANDING):  amiodarone    Tablet 200 milliGRAM(s) Oral daily  artificial  tears Solution 1 Drop(s) Both EYES four times a day  aspirin  chewable 81 milliGRAM(s) Oral daily  atorvastatin 40 milliGRAM(s) Oral at bedtime  BACItracin   Ointment 1 Application(s) Topical two times a day  busPIRone 5 milliGRAM(s) Oral two times a day  clopidogrel Tablet 75 milliGRAM(s) Oral daily  clotrimazole/betamethasone Cream 1 Application(s) Topical two times a day  epoetin georgie Injectable 43141 Unit(s) IV Push every other day  fluticasone propionate   110 MICROgram(s) HFA Inhaler 1 Puff(s) Inhalation daily  heparin  Infusion. 600 Unit(s)/Hr (6 mL/Hr) IV Continuous <Continuous>  insulin lispro (HumaLOG) corrective regimen sliding scale   SubCutaneous Before meals and at bedtime  Nephro-sophie 1 Tablet(s) Oral daily  pantoprazole   Suspension 40 milliGRAM(s) Oral before breakfast  tamsulosin 0.4 milliGRAM(s) Oral at bedtime  vancomycin  IVPB 500 milliGRAM(s) IV Intermittent once    MEDICATIONS  (PRN):  ALBUTerol    90 MICROgram(s) HFA Inhaler 2 Puff(s) Inhalation every 6 hours PRN Shortness of Breath and/or Wheezing  heparin  Injectable 6500 Unit(s) IV Push every 6 hours PRN For aPTT less than 40  heparin  Injectable 3000 Unit(s) IV Push every 6 hours PRN For aPTT between 40 - 57      Vital Signs Last 24 Hrs  T(C): 36.8 (21 Nov 2017 08:54), Max: 37.4 (20 Nov 2017 16:53)  T(F): 98.2 (21 Nov 2017 08:54), Max: 99.3 (20 Nov 2017 16:53)  HR: 84 (21 Nov 2017 08:54) (81 - 90)  BP: 116/62 (21 Nov 2017 08:54) (103/69 - 145/74)  BP(mean): --  RR: 18 (21 Nov 2017 08:54) (18 - 18)  SpO2: 100% (21 Nov 2017 08:54) (97% - 100%)  CAPILLARY BLOOD GLUCOSE      POCT Blood Glucose.: 150 mg/dL (21 Nov 2017 08:02)  POCT Blood Glucose.: 182 mg/dL (20 Nov 2017 22:10)  POCT Blood Glucose.: 143 mg/dL (20 Nov 2017 17:51)  POCT Blood Glucose.: 137 mg/dL (20 Nov 2017 13:05)    I&O's Summary    20 Nov 2017 07:01  -  21 Nov 2017 07:00  --------------------------------------------------------  IN: 362 mL / OUT: 0 mL / NET: 362 mL        PHYSICAL EXAM:  GENERAL: NAD.  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  PSYCH: calm  SKIN: No rashes or lesions    LABS:                        9.0    8.93  )-----------( 329      ( 20 Nov 2017 06:29 )             29.1     11-20    138  |  97  |  26<H>  ----------------------------<  128<H>  4.3   |  23  |  3.67<H>    Ca    9.7      20 Nov 2017 06:29      PT/INR - ( 21 Nov 2017 08:20 )   PT: 18.7 sec;   INR: 1.71 ratio         PTT - ( 21 Nov 2017 08:20 )  PTT:70.6 sec    Consultant(s) Notes Reviewed:  Yes    Care Discussed with Consultants/Other Providers: Yes    Case Discussed with Family: family aware of the current situation.    Goals of Care: Patient is a 72y old  Male who presents with a chief complaint of Hypotension (24 Oct 2017 15:51)      SUBJECTIVE / OVERNIGHT EVENTS:   Patient is mute and nonverbal, did not answer to any question. As per HD nurse he was much more awake and comfortable, than other days.    ROS:  All other review of systems- can not be obtained due to current condition.    Allergies    No Known Allergies    Intolerances        MEDICATIONS  (STANDING):  amiodarone    Tablet 200 milliGRAM(s) Oral daily  artificial  tears Solution 1 Drop(s) Both EYES four times a day  aspirin  chewable 81 milliGRAM(s) Oral daily  atorvastatin 40 milliGRAM(s) Oral at bedtime  BACItracin   Ointment 1 Application(s) Topical two times a day  busPIRone 5 milliGRAM(s) Oral two times a day  clopidogrel Tablet 75 milliGRAM(s) Oral daily  clotrimazole/betamethasone Cream 1 Application(s) Topical two times a day  epoetin georgie Injectable 32397 Unit(s) IV Push every other day  fluticasone propionate   110 MICROgram(s) HFA Inhaler 1 Puff(s) Inhalation daily  heparin  Infusion. 600 Unit(s)/Hr (6 mL/Hr) IV Continuous <Continuous>  insulin lispro (HumaLOG) corrective regimen sliding scale   SubCutaneous Before meals and at bedtime  Nephro-sophie 1 Tablet(s) Oral daily  pantoprazole   Suspension 40 milliGRAM(s) Oral before breakfast  tamsulosin 0.4 milliGRAM(s) Oral at bedtime  vancomycin  IVPB 500 milliGRAM(s) IV Intermittent once    MEDICATIONS  (PRN):  ALBUTerol    90 MICROgram(s) HFA Inhaler 2 Puff(s) Inhalation every 6 hours PRN Shortness of Breath and/or Wheezing  heparin  Injectable 6500 Unit(s) IV Push every 6 hours PRN For aPTT less than 40  heparin  Injectable 3000 Unit(s) IV Push every 6 hours PRN For aPTT between 40 - 57      Vital Signs Last 24 Hrs  T(C): 36.8 (21 Nov 2017 08:54), Max: 37.4 (20 Nov 2017 16:53)  T(F): 98.2 (21 Nov 2017 08:54), Max: 99.3 (20 Nov 2017 16:53)  HR: 84 (21 Nov 2017 08:54) (81 - 90)  BP: 116/62 (21 Nov 2017 08:54) (103/69 - 145/74)  BP(mean): --  RR: 18 (21 Nov 2017 08:54) (18 - 18)  SpO2: 100% (21 Nov 2017 08:54) (97% - 100%)  CAPILLARY BLOOD GLUCOSE      POCT Blood Glucose.: 150 mg/dL (21 Nov 2017 08:02)  POCT Blood Glucose.: 182 mg/dL (20 Nov 2017 22:10)  POCT Blood Glucose.: 143 mg/dL (20 Nov 2017 17:51)  POCT Blood Glucose.: 137 mg/dL (20 Nov 2017 13:05)    I&O's Summary    20 Nov 2017 07:01  -  21 Nov 2017 07:00  --------------------------------------------------------  IN: 362 mL / OUT: 0 mL / NET: 362 mL        PHYSICAL EXAM:  GENERAL: NAD.  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  PSYCH: calm  SKIN: No rashes or lesions    LABS:                        9.0    8.93  )-----------( 329      ( 20 Nov 2017 06:29 )             29.1     11-20    138  |  97  |  26<H>  ----------------------------<  128<H>  4.3   |  23  |  3.67<H>    Ca    9.7      20 Nov 2017 06:29      PT/INR - ( 21 Nov 2017 08:20 )   PT: 18.7 sec;   INR: 1.71 ratio         PTT - ( 21 Nov 2017 08:20 )  PTT:70.6 sec    Consultant(s) Notes Reviewed:  Yes    Care Discussed with Consultants/Other Providers: Nephrology, ID     Case Discussed with Family: family aware of the current situation.    Goals of Care: Patient is a 72y old  Male who presents with a chief complaint of Hypotension (24 Oct 2017 15:51)      SUBJECTIVE / OVERNIGHT EVENTS:   Patient is mute and nonverbal, did not answer to any question. As per HD nurse he was much more awake and comfortable, than other days.    ROS:  All other review of systems- can not be obtained due to current condition.    Allergies    No Known Allergies    Intolerances        MEDICATIONS  (STANDING):  amiodarone    Tablet 200 milliGRAM(s) Oral daily  artificial  tears Solution 1 Drop(s) Both EYES four times a day  aspirin  chewable 81 milliGRAM(s) Oral daily  atorvastatin 40 milliGRAM(s) Oral at bedtime  BACItracin   Ointment 1 Application(s) Topical two times a day  busPIRone 5 milliGRAM(s) Oral two times a day  clopidogrel Tablet 75 milliGRAM(s) Oral daily  clotrimazole/betamethasone Cream 1 Application(s) Topical two times a day  epoetin georgie Injectable 83226 Unit(s) IV Push every other day  fluticasone propionate   110 MICROgram(s) HFA Inhaler 1 Puff(s) Inhalation daily  heparin  Infusion. 600 Unit(s)/Hr (6 mL/Hr) IV Continuous <Continuous>  insulin lispro (HumaLOG) corrective regimen sliding scale   SubCutaneous Before meals and at bedtime  Nephro-sophie 1 Tablet(s) Oral daily  pantoprazole   Suspension 40 milliGRAM(s) Oral before breakfast  tamsulosin 0.4 milliGRAM(s) Oral at bedtime  vancomycin  IVPB 500 milliGRAM(s) IV Intermittent once    MEDICATIONS  (PRN):  ALBUTerol    90 MICROgram(s) HFA Inhaler 2 Puff(s) Inhalation every 6 hours PRN Shortness of Breath and/or Wheezing  heparin  Injectable 6500 Unit(s) IV Push every 6 hours PRN For aPTT less than 40  heparin  Injectable 3000 Unit(s) IV Push every 6 hours PRN For aPTT between 40 - 57      Vital Signs Last 24 Hrs  T(C): 36.8 (21 Nov 2017 08:54), Max: 37.4 (20 Nov 2017 16:53)  T(F): 98.2 (21 Nov 2017 08:54), Max: 99.3 (20 Nov 2017 16:53)  HR: 84 (21 Nov 2017 08:54) (81 - 90)  BP: 116/62 (21 Nov 2017 08:54) (103/69 - 145/74)  BP(mean): --  RR: 18 (21 Nov 2017 08:54) (18 - 18)  SpO2: 100% (21 Nov 2017 08:54) (97% - 100%)  CAPILLARY BLOOD GLUCOSE      POCT Blood Glucose.: 150 mg/dL (21 Nov 2017 08:02)  POCT Blood Glucose.: 182 mg/dL (20 Nov 2017 22:10)  POCT Blood Glucose.: 143 mg/dL (20 Nov 2017 17:51)  POCT Blood Glucose.: 137 mg/dL (20 Nov 2017 13:05)    I&O's Summary    20 Nov 2017 07:01  -  21 Nov 2017 07:00  --------------------------------------------------------  IN: 362 mL / OUT: 0 mL / NET: 362 mL        PHYSICAL EXAM:  GENERAL: NAD.  HEAD:  Atraumatic, Normocephalic  EYES: EOMI, PERRLA, conjunctiva and sclera clear  NECK: Supple, No JVD  CHEST/LUNG: Clear to auscultation bilaterally; No wheeze  HEART: Regular rate and rhythm; No murmurs, rubs, or gallops  ABDOMEN: Soft, Nontender, Nondistended; Bowel sounds present  EXTREMITIES:  2+ Peripheral Pulses, No clubbing, cyanosis, or edema  NEUROLOGY: Mute,muscle wasting in right lower limb with contractures in both upper and lower extremities.  PSYCH: calm  SKIN: No rashes, stage 3 sacral ulcer     LABS:                        9.0    8.93  )-----------( 329      ( 20 Nov 2017 06:29 )             29.1     11-20    138  |  97  |  26<H>  ----------------------------<  128<H>  4.3   |  23  |  3.67<H>    Ca    9.7      20 Nov 2017 06:29      PT/INR - ( 21 Nov 2017 08:20 )   PT: 18.7 sec;   INR: 1.71 ratio         PTT - ( 21 Nov 2017 08:20 )  PTT:70.6 sec    Consultant(s) Notes Reviewed:  Yes    Care Discussed with Consultants/Other Providers: Nephrology, ID, palliative      Case Discussed with Family: family aware of the current situation.    Goals of Care:

## 2017-11-21 NOTE — PROGRESS NOTE ADULT - PROBLEM SELECTOR PLAN 10
DVT PPX: coumadin was held for AVF placement, resumed heparin gtt and bridging back to coumadin    Dispo: PT recommends d/c to [facility] DVT PPX: on Coumadin   Dispo: PT recommends d/c to [facility]

## 2017-11-21 NOTE — PROGRESS NOTE ADULT - PROBLEM SELECTOR PLAN 1
Afebrile, No SOB. Cultures from 11/7 negative x2 after 5 days. S/p Permacath removal on 11/6, replacement on 11/9, and RUE AVF placement on 11/16.   - c/w IV Vanco 500mg post HD. Last day today, as cultures are negative as per ID.  - CT A/P negative, CXR negative Afebrile, No SOB. Cultures from 11/7 negative x2 after 5 days. S/p Permacath removal on 11/6, replacement on 11/9, and RUE AVF placement on 11/16.   - c/w IV Vanco 500mg post HD. Last day today, as cultures are negative as per ID.  - D/W ID, will stop IV Vancomycin

## 2017-11-21 NOTE — PROGRESS NOTE ADULT - SUBJECTIVE AND OBJECTIVE BOX
Seen on HD, no distress, non-verbal    Vital Signs Last 24 Hrs  T(C): 36.9 (11-21-17 @ 13:55), Max: 37.4 (11-20-17 @ 16:53)  T(F): 98.4 (11-21-17 @ 13:55), Max: 99.3 (11-20-17 @ 16:53)  HR: 100 (11-21-17 @ 13:55) (81 - 100)  BP: 125/78 (11-21-17 @ 13:55) (103/69 - 145/74)  BP(mean): --  RR: 18 (11-21-17 @ 13:55) (18 - 18)  SpO2: 98% (11-21-17 @ 13:55) (97% - 100%)    Respiratory: b/l air entry, clear  Cardiovascular: S1 S2 regular  Gastrointestinal: soft, NT, BS present  Extremities: no edema     ALBUTerol    90 MICROgram(s) HFA Inhaler 2 Puff(s) Inhalation every 6 hours PRN  amiodarone    Tablet 200 milliGRAM(s) Oral daily  artificial  tears Solution 1 Drop(s) Both EYES four times a day  aspirin  chewable 81 milliGRAM(s) Oral daily  atorvastatin 40 milliGRAM(s) Oral at bedtime  BACItracin   Ointment 1 Application(s) Topical two times a day  busPIRone 5 milliGRAM(s) Oral two times a day  clopidogrel Tablet 75 milliGRAM(s) Oral daily  clotrimazole/betamethasone Cream 1 Application(s) Topical two times a day  epoetin georgie Injectable 69535 Unit(s) IV Push every other day  fluticasone propionate   110 MICROgram(s) HFA Inhaler 1 Puff(s) Inhalation daily  insulin lispro (HumaLOG) corrective regimen sliding scale   SubCutaneous Before meals and at bedtime  Nephro-sophie 1 Tablet(s) Oral daily  pantoprazole   Suspension 40 milliGRAM(s) Oral before breakfast  tamsulosin 0.4 milliGRAM(s) Oral at bedtime  vancomycin  IVPB 500 milliGRAM(s) IV Intermittent once  warfarin 3.5 milliGRAM(s) Oral once                          8.7    7.58  )-----------( 334      ( 21 Nov 2017 11:19 )             28.8     21 Nov 2017 11:16    141    |  97     |  44     ----------------------------<  145    4.4     |  24     |  5.64     Ca    9.4        21 Nov 2017 11:16      Assessment and Recommendation:     ESRD on HD  Adm w/bacteremia, initially thought to be contam, but repeat bld cx on 10/24 and 11/2 positive for CNS  Perm Cath d/c-d after HD 11/6  F/u blood cx are negative  S/p new perm cath 10/9, s/p AVG 11/16  Stable on HD today  Next HD Friday 11/24  D/c planning to [facility]

## 2017-11-21 NOTE — PROGRESS NOTE ADULT - PROBLEM SELECTOR PLAN 3
- coumadin was held for AVF placement  - resumed Heparin gtt and bridging back to Coumadin  - monitor INR daily - coumadin was held for AVF placement  - Given the risk of bleeding with Aspirin and Plavix, will stop Heparin Drip   - One dose Coumadin 3.5 mg stat   - monitor INR daily with goal INR 2-3

## 2017-11-21 NOTE — PROGRESS NOTE ADULT - PROBLEM SELECTOR PROBLEM 6
Chronic obstructive pulmonary disease, unspecified COPD type
Chronic obstructive pulmonary disease, unspecified COPD type
Dysphagia, unspecified type
Chronic obstructive pulmonary disease, unspecified COPD type
Dysphagia, unspecified type
Chronic obstructive pulmonary disease, unspecified COPD type
Dysphagia, unspecified type
Chronic obstructive pulmonary disease, unspecified COPD type
Dysphagia, unspecified type
Chronic obstructive pulmonary disease, unspecified COPD type

## 2017-11-21 NOTE — PROGRESS NOTE ADULT - PROBLEM SELECTOR PROBLEM 5
Type 2 diabetes mellitus without complication, without long-term current use of insulin
Type 2 diabetes mellitus without complication, without long-term current use of insulin
Chronic obstructive pulmonary disease, unspecified COPD type
Chronic obstructive pulmonary disease, unspecified COPD type
Type 2 diabetes mellitus without complication, without long-term current use of insulin
Chronic obstructive pulmonary disease, unspecified COPD type
Type 2 diabetes mellitus without complication, without long-term current use of insulin
Chronic obstructive pulmonary disease, unspecified COPD type
Chronic obstructive pulmonary disease, unspecified COPD type
Type 2 diabetes mellitus without complication, without long-term current use of insulin
Chronic obstructive pulmonary disease, unspecified COPD type
Chronic obstructive pulmonary disease, unspecified COPD type
Type 2 diabetes mellitus without complication, without long-term current use of insulin

## 2017-11-21 NOTE — PROGRESS NOTE ADULT - ASSESSMENT
73 yo with DM,ESRD recent HD,CVA ,non verbal.Recent bacteremia,pyelo s/p antimicrobials  CNS bacteremia-?HD cath related  s/p Catheter replacement  Repeat Cx negative  DC vanco after today  Other plan per primary team  ID will follow as needed,please call [phone] if any questions or issues.

## 2017-11-21 NOTE — PROGRESS NOTE ADULT - ATTENDING COMMENTS
I have performed a history and physical exam of this patient and discussed the management with the geriatric medicine fellow, Dr. Ramirez. I have reviewed the geriatric medicine fellow's note and agree with the documented findings and plan of care. The above recommendations and possible side effects of medications were discussed with the patient's family. The patient's family had all questions answered and expressed understanding of the plan.

## 2017-11-21 NOTE — PROGRESS NOTE ADULT - PROBLEM SELECTOR PLAN 4
though pt had depressed T's on EKG, trop is dramatically lower  - cont ASA/Plavix though pt had depressed T's on EKG, trop is dramatically lower  - cont ASA/Plavix  - No further cardiac intervention at this time  - Poor prognosis   - Discussed with family

## 2017-11-21 NOTE — PROGRESS NOTE ADULT - PROBLEM SELECTOR PLAN 9
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 Has been stable  - c/w ASA, Plavix, statin  - coumadin was held for AVF placement  - Given the risk of bleeding with Aspirin and Plavix, will stop Heparin Drip, last INR 1.71

## 2017-11-21 NOTE — PROGRESS NOTE ADULT - SUBJECTIVE AND OBJECTIVE BOX
Patient is a 72y old  Male who presents with a chief complaint of Hypotension (24 Oct 2017 15:51)    Being followed by ID for abcteremia    Interval history:  No acute events      ROS:  Not obtainable    Antimicrobials:    vancomycin  IVPB 500 milliGRAM(s) IV Intermittent once-post HD     Other medications reviewed    Vital Signs Last 24 Hrs  T(C): 36.8 (11-21-17 @ 08:54), Max: 37.4 (11-20-17 @ 16:53)  T(F): 98.2 (11-21-17 @ 08:54), Max: 99.3 (11-20-17 @ 16:53)  HR: 84 (11-21-17 @ 08:54) (81 - 96)  BP: 116/62 (11-21-17 @ 08:54) (103/69 - 145/74)  BP(mean): --  RR: 18 (11-21-17 @ 08:54) (18 - 18)  SpO2: 100% (11-21-17 @ 08:54) (97% - 100%)    Physical Exam:    Constitutional well preserved,comfortable,pleasant    HEENT PERRLA ,No pallor or icterus    No oral exudate or erythema    Neck supple no JVD or LN    Chest Good AE,CTA  R SC HD catheter no erythema or tenderness    CVS RRR S1 S2 WNl No murmur or rub or gallop    Abd soft BS normal No tenderness no masses    Ext R arm AVF no discharge or tenderness    IV site no erythema tenderness or discharge    Joints no swelling or LOM    CNS no verbal response    Lab Data:                          9.0    8.93  )-----------( 329      ( 20 Nov 2017 06:29 )             29.1       11-20    138  |  97  |  26<H>  ----------------------------<  128<H>  4.3   |  23  |  3.67<H>    Ca    9.7      20 Nov 2017 06:29

## 2017-11-22 ENCOUNTER — TRANSCRIPTION ENCOUNTER (OUTPATIENT)
Age: 72
End: 2017-11-22

## 2017-12-19 PROCEDURE — C1769: CPT

## 2017-12-19 PROCEDURE — 76937 US GUIDE VASCULAR ACCESS: CPT

## 2017-12-19 PROCEDURE — 84132 ASSAY OF SERUM POTASSIUM: CPT

## 2017-12-19 PROCEDURE — 74230 X-RAY XM SWLNG FUNCJ C+: CPT

## 2017-12-19 PROCEDURE — 94002 VENT MGMT INPAT INIT DAY: CPT

## 2017-12-19 PROCEDURE — 74176 CT ABD & PELVIS W/O CONTRAST: CPT

## 2017-12-19 PROCEDURE — C1889: CPT

## 2017-12-19 PROCEDURE — 86900 BLOOD TYPING SEROLOGIC ABO: CPT

## 2017-12-19 PROCEDURE — C8929: CPT

## 2017-12-19 PROCEDURE — 86803 HEPATITIS C AB TEST: CPT

## 2017-12-19 PROCEDURE — 94799 UNLISTED PULMONARY SVC/PX: CPT

## 2017-12-19 PROCEDURE — P9011: CPT

## 2017-12-19 PROCEDURE — 83550 IRON BINDING TEST: CPT

## 2017-12-19 PROCEDURE — C1760: CPT

## 2017-12-19 PROCEDURE — 97163 PT EVAL HIGH COMPLEX 45 MIN: CPT

## 2017-12-19 PROCEDURE — 97535 SELF CARE MNGMENT TRAINING: CPT

## 2017-12-19 PROCEDURE — 83880 ASSAY OF NATRIURETIC PEPTIDE: CPT

## 2017-12-19 PROCEDURE — 99285 EMERGENCY DEPT VISIT HI MDM: CPT | Mod: 25

## 2017-12-19 PROCEDURE — 85014 HEMATOCRIT: CPT

## 2017-12-19 PROCEDURE — 82607 VITAMIN B-12: CPT

## 2017-12-19 PROCEDURE — 36430 TRANSFUSION BLD/BLD COMPNT: CPT

## 2017-12-19 PROCEDURE — 82728 ASSAY OF FERRITIN: CPT

## 2017-12-19 PROCEDURE — 77001 FLUOROGUIDE FOR VEIN DEVICE: CPT

## 2017-12-19 PROCEDURE — 82550 ASSAY OF CK (CPK): CPT

## 2017-12-19 PROCEDURE — 85730 THROMBOPLASTIN TIME PARTIAL: CPT

## 2017-12-19 PROCEDURE — C1725: CPT

## 2017-12-19 PROCEDURE — 84484 ASSAY OF TROPONIN QUANT: CPT

## 2017-12-19 PROCEDURE — 86901 BLOOD TYPING SEROLOGIC RH(D): CPT

## 2017-12-19 PROCEDURE — 82330 ASSAY OF CALCIUM: CPT

## 2017-12-19 PROCEDURE — 87340 HEPATITIS B SURFACE AG IA: CPT

## 2017-12-19 PROCEDURE — 82803 BLOOD GASES ANY COMBINATION: CPT

## 2017-12-19 PROCEDURE — 99261: CPT

## 2017-12-19 PROCEDURE — 80053 COMPREHEN METABOLIC PANEL: CPT

## 2017-12-19 PROCEDURE — 71045 X-RAY EXAM CHEST 1 VIEW: CPT

## 2017-12-19 PROCEDURE — 97760 ORTHOTIC MGMT&TRAING 1ST ENC: CPT

## 2017-12-19 PROCEDURE — 87086 URINE CULTURE/COLONY COUNT: CPT

## 2017-12-19 PROCEDURE — 86850 RBC ANTIBODY SCREEN: CPT

## 2017-12-19 PROCEDURE — C1750: CPT

## 2017-12-19 PROCEDURE — 80074 ACUTE HEPATITIS PANEL: CPT

## 2017-12-19 PROCEDURE — 82947 ASSAY GLUCOSE BLOOD QUANT: CPT

## 2017-12-19 PROCEDURE — 85027 COMPLETE CBC AUTOMATED: CPT

## 2017-12-19 PROCEDURE — 85610 PROTHROMBIN TIME: CPT

## 2017-12-19 PROCEDURE — C1768: CPT

## 2017-12-19 PROCEDURE — 94003 VENT MGMT INPAT SUBQ DAY: CPT

## 2017-12-19 PROCEDURE — 97110 THERAPEUTIC EXERCISES: CPT

## 2017-12-19 PROCEDURE — 86706 HEP B SURFACE ANTIBODY: CPT

## 2017-12-19 PROCEDURE — 94640 AIRWAY INHALATION TREATMENT: CPT

## 2017-12-19 PROCEDURE — 80061 LIPID PANEL: CPT

## 2017-12-19 PROCEDURE — 86704 HEP B CORE ANTIBODY TOTAL: CPT

## 2017-12-19 PROCEDURE — 97167 OT EVAL HIGH COMPLEX 60 MIN: CPT

## 2017-12-19 PROCEDURE — 82553 CREATINE MB FRACTION: CPT

## 2017-12-19 PROCEDURE — 93005 ELECTROCARDIOGRAM TRACING: CPT

## 2017-12-19 PROCEDURE — C1894: CPT

## 2017-12-19 PROCEDURE — 70450 CT HEAD/BRAIN W/O DYE: CPT

## 2017-12-19 PROCEDURE — G0515: CPT

## 2017-12-19 PROCEDURE — 82533 TOTAL CORTISOL: CPT

## 2017-12-19 PROCEDURE — 87070 CULTURE OTHR SPECIMN AEROBIC: CPT

## 2017-12-19 PROCEDURE — 83735 ASSAY OF MAGNESIUM: CPT

## 2017-12-19 PROCEDURE — C1752: CPT

## 2017-12-19 PROCEDURE — 93970 EXTREMITY STUDY: CPT

## 2017-12-19 PROCEDURE — 93458 L HRT ARTERY/VENTRICLE ANGIO: CPT

## 2017-12-19 PROCEDURE — 82435 ASSAY OF BLOOD CHLORIDE: CPT

## 2017-12-19 PROCEDURE — 80076 HEPATIC FUNCTION PANEL: CPT

## 2017-12-19 PROCEDURE — 86923 COMPATIBILITY TEST ELECTRIC: CPT

## 2017-12-19 PROCEDURE — 84443 ASSAY THYROID STIM HORMONE: CPT

## 2017-12-19 PROCEDURE — 80048 BASIC METABOLIC PNL TOTAL CA: CPT

## 2017-12-19 PROCEDURE — 83605 ASSAY OF LACTIC ACID: CPT

## 2017-12-19 PROCEDURE — C1874: CPT

## 2017-12-19 PROCEDURE — 87040 BLOOD CULTURE FOR BACTERIA: CPT

## 2017-12-19 PROCEDURE — 76775 US EXAM ABDO BACK WALL LIM: CPT

## 2017-12-19 PROCEDURE — C1887: CPT

## 2017-12-19 PROCEDURE — 82962 GLUCOSE BLOOD TEST: CPT

## 2017-12-19 PROCEDURE — 82272 OCCULT BLD FECES 1-3 TESTS: CPT

## 2017-12-19 PROCEDURE — 97112 NEUROMUSCULAR REEDUCATION: CPT

## 2017-12-19 PROCEDURE — 93926 LOWER EXTREMITY STUDY: CPT

## 2017-12-19 PROCEDURE — 36556 INSERT NON-TUNNEL CV CATH: CPT

## 2017-12-19 PROCEDURE — 87150 DNA/RNA AMPLIFIED PROBE: CPT

## 2017-12-19 PROCEDURE — 80202 ASSAY OF VANCOMYCIN: CPT

## 2017-12-19 PROCEDURE — 92526 ORAL FUNCTION THERAPY: CPT

## 2017-12-19 PROCEDURE — 84295 ASSAY OF SERUM SODIUM: CPT

## 2017-12-19 PROCEDURE — 82746 ASSAY OF FOLIC ACID SERUM: CPT

## 2017-12-19 PROCEDURE — 84100 ASSAY OF PHOSPHORUS: CPT

## 2017-12-19 PROCEDURE — P9016: CPT

## 2017-12-19 PROCEDURE — 92611 MOTION FLUOROSCOPY/SWALLOW: CPT

## 2017-12-19 PROCEDURE — 97530 THERAPEUTIC ACTIVITIES: CPT

## 2017-12-19 PROCEDURE — 36558 INSERT TUNNELED CV CATH: CPT

## 2017-12-19 PROCEDURE — 83036 HEMOGLOBIN GLYCOSYLATED A1C: CPT

## 2017-12-19 PROCEDURE — 92610 EVALUATE SWALLOWING FUNCTION: CPT

## 2018-02-07 ENCOUNTER — INPATIENT (INPATIENT)
Facility: HOSPITAL | Age: 73
LOS: 7 days | Discharge: NURSING, REHAB OR LONG-TERM CARE | End: 2018-02-15
Attending: HOSPITALIST | Admitting: HOSPITALIST
Payer: MEDICARE

## 2018-02-07 VITALS
SYSTOLIC BLOOD PRESSURE: 122 MMHG | DIASTOLIC BLOOD PRESSURE: 67 MMHG | HEART RATE: 77 BPM | RESPIRATION RATE: 18 BRPM | TEMPERATURE: 98 F | OXYGEN SATURATION: 100 %

## 2018-02-07 DIAGNOSIS — Z98.890 OTHER SPECIFIED POSTPROCEDURAL STATES: Chronic | ICD-10-CM

## 2018-02-07 PROCEDURE — 71045 X-RAY EXAM CHEST 1 VIEW: CPT | Mod: 26

## 2018-02-07 NOTE — ED ADULT NURSE NOTE - NS ED NURSE LEVEL OF CONSCIOUSNESS ORIENTATION
HPI      ROS      Physical Exam    Note reviewed about itching reaction after platelet transfusion.  1.  Premed with po benadryl 25 mg ordered for platelets; not RBCs.  2. Platelet transfusion parameter reduced to 20,000 since counts have been improving.    Rafita Rogel     Oriented - self; Oriented - place; Oriented - time

## 2018-02-07 NOTE — ED PROVIDER NOTE - OBJECTIVE STATEMENT
71yo male, PMH CAD s/p 12-14 stents (placed [phone]), DM, HTN, HLD, CVA (4 yrs ago, residual left sided facial weakness), lumbar radiculopathy with herniated disc, COPD, CKD , bipolar disorder (no meds, sees psychiatrist), chronic kidney stones (has had ureteral stent in past)   from [facility] for AMS. Pt was at HD and was pulling out dialysis cath from right chest. Pt missed HD today.    Daughter:    last HD on Monday 73yo male, PMH CAD s/p 12-14 stents (placed [phone]), DM, HTN, HLD, CVA (4 yrs ago, residual left sided facial weakness), lumbar radiculopathy with herniated disc, COPD, CKD , bipolar disorder (no meds, sees psychiatrist), chronic kidney stones  from [facility] for pulling out his HD catheter.  Pt missed HD today. Last HD on Monday  Daughter: Anisa [phone] 73yo male, PMH CAD s/p 12-14 stents (placed [phone]), DM, HTN, HLD, CVA (4 yrs ago, residual left sided facial weakness), lumbar radiculopathy with herniated disc, COPD, CKD , bipolar disorder (no meds, sees psychiatrist), chronic kidney stones from [facility] for pulling out his HD catheter.  Pt missed HD today. Last HD on Monday.   Daughter: Anisa [phone] states that this is not unusual of patient's behavior. 73yo male, PMH CAD s/p 12-14 stents (placed [phone]), DM, HTN, HLD, CVA (4 yrs ago, residual left sided facial weakness), lumbar radiculopathy with herniated disc, COPD, CKD , bipolar disorder (no meds, sees psychiatrist), chronic kidney stones from [facility] for pulling out his HD catheter.  Pt missed HD today. Last HD on Monday.   Daughter: Anisa [phone] states that this is not unusual of patient's behavior. Pt usually likes to hold on to things and family tends to give him "toys" to hold. However, this time, did not have anything.

## 2018-02-07 NOTE — ED PROVIDER NOTE - ATTENDING CONTRIBUTION TO CARE
pt unable to speak, history obtained from daughter.   pt with recent long hospitalization. many comorbidites. he has esrd on hd mwf, last on monday. [facility] as he pulled out his right permacath nursing home. he did not get his HD today. per daughter, pt is otherwise baseline. exam, vs wnl, nad. hs and lungs normal, cathether partially out.  no signs of infection or bleeding. pt had an AVF done in 11/2017 on left arm but it has no palpable thrill. lungs clear. labs show normal k. cxr no pulm edema but HD catheter is sitting in subclavian instead of SVC. no need for emergent HD. will admit for HD and to place new catheter.

## 2018-02-07 NOTE — ED ADULT NURSE NOTE - OBJECTIVE STATEMENT
Patient awake, alert, respirations equal and unlabored, in NAD. Pt is a&ox1-2 (knows birthday, name, states year is "2017"), baseline per daughter ( Anisa Lara ) "he normally knows where he is, he doesn't know now. I saw him yesterday". As per daughter at bedside ( Anisa Lara) pt came to ED from dialysis, pt partially pulled out R chest wall port. Observed to have some blood around canula, site is dressed and secured. Blanchable redness to sacrum. Pt cleaned, new diaper applied. Last dialysis yesterday. 20G to [place] by RN Marco A. bloodwork/XR results pending. Safety and comfort maintained.

## 2018-02-07 NOTE — ED ADULT TRIAGE NOTE - CHIEF COMPLAINT QUOTE
Pt. from [facility] here for pulling out dialysis cath form R chest. Pt. missed dialysis today

## 2018-02-07 NOTE — ED PROVIDER NOTE - SKIN, MLM
Skin normal color for race, warm, dry and intact. No evidence of rash. HD catheter in right upper chest

## 2018-02-07 NOTE — ED ADULT NURSE NOTE - CHIEF COMPLAINT QUOTE
Pt. from [facility] here for pulling out dialysis cath form R chest. Pt. missed dialysis today

## 2018-02-08 DIAGNOSIS — L89.150 PRESSURE ULCER OF SACRAL REGION, UNSTAGEABLE: ICD-10-CM

## 2018-02-08 DIAGNOSIS — I25.10 ATHEROSCLEROTIC HEART DISEASE OF NATIVE CORONARY ARTERY WITHOUT ANGINA PECTORIS: ICD-10-CM

## 2018-02-08 DIAGNOSIS — J44.9 CHRONIC OBSTRUCTIVE PULMONARY DISEASE, UNSPECIFIED: ICD-10-CM

## 2018-02-08 DIAGNOSIS — N18.6 END STAGE RENAL DISEASE: ICD-10-CM

## 2018-02-08 DIAGNOSIS — I50.22 CHRONIC SYSTOLIC (CONGESTIVE) HEART FAILURE: ICD-10-CM

## 2018-02-08 DIAGNOSIS — I63.9 CEREBRAL INFARCTION, UNSPECIFIED: ICD-10-CM

## 2018-02-08 DIAGNOSIS — E11.22 TYPE 2 DIABETES MELLITUS WITH DIABETIC CHRONIC KIDNEY DISEASE: ICD-10-CM

## 2018-02-08 DIAGNOSIS — I48.91 UNSPECIFIED ATRIAL FIBRILLATION: ICD-10-CM

## 2018-02-08 DIAGNOSIS — I10 ESSENTIAL (PRIMARY) HYPERTENSION: ICD-10-CM

## 2018-02-08 LAB
ALBUMIN SERPL ELPH-MCNC: 2.8 G/DL — LOW (ref 3.3–5)
ALBUMIN SERPL ELPH-MCNC: 3 G/DL — LOW (ref 3.3–5)
ALP SERPL-CCNC: 84 U/L — SIGNIFICANT CHANGE UP (ref 40–120)
ALP SERPL-CCNC: 90 U/L — SIGNIFICANT CHANGE UP (ref 40–120)
ALT FLD-CCNC: 22 U/L — SIGNIFICANT CHANGE UP (ref 4–41)
ALT FLD-CCNC: 22 U/L — SIGNIFICANT CHANGE UP (ref 4–41)
APTT BLD: 39.1 SEC — HIGH (ref 27.5–37.4)
AST SERPL-CCNC: 21 U/L — SIGNIFICANT CHANGE UP (ref 4–40)
AST SERPL-CCNC: 58 U/L — HIGH (ref 4–40)
BASOPHILS # BLD AUTO: 0.02 K/UL — SIGNIFICANT CHANGE UP (ref 0–0.2)
BASOPHILS # BLD AUTO: 0.03 K/UL — SIGNIFICANT CHANGE UP (ref 0–0.2)
BASOPHILS NFR BLD AUTO: 0.3 % — SIGNIFICANT CHANGE UP (ref 0–2)
BASOPHILS NFR BLD AUTO: 0.5 % — SIGNIFICANT CHANGE UP (ref 0–2)
BILIRUB SERPL-MCNC: 0.2 MG/DL — SIGNIFICANT CHANGE UP (ref 0.2–1.2)
BILIRUB SERPL-MCNC: 0.2 MG/DL — SIGNIFICANT CHANGE UP (ref 0.2–1.2)
BUN SERPL-MCNC: 50 MG/DL — HIGH (ref 7–23)
BUN SERPL-MCNC: 53 MG/DL — HIGH (ref 7–23)
CALCIUM SERPL-MCNC: 8.7 MG/DL — SIGNIFICANT CHANGE UP (ref 8.4–10.5)
CALCIUM SERPL-MCNC: 8.8 MG/DL — SIGNIFICANT CHANGE UP (ref 8.4–10.5)
CHLORIDE SERPL-SCNC: 98 MMOL/L — SIGNIFICANT CHANGE UP (ref 98–107)
CHLORIDE SERPL-SCNC: 99 MMOL/L — SIGNIFICANT CHANGE UP (ref 98–107)
CO2 SERPL-SCNC: 28 MMOL/L — SIGNIFICANT CHANGE UP (ref 22–31)
CO2 SERPL-SCNC: 28 MMOL/L — SIGNIFICANT CHANGE UP (ref 22–31)
CREAT SERPL-MCNC: 3.95 MG/DL — HIGH (ref 0.5–1.3)
CREAT SERPL-MCNC: 4.34 MG/DL — HIGH (ref 0.5–1.3)
EOSINOPHIL # BLD AUTO: 0.03 K/UL — SIGNIFICANT CHANGE UP (ref 0–0.5)
EOSINOPHIL # BLD AUTO: 0.06 K/UL — SIGNIFICANT CHANGE UP (ref 0–0.5)
EOSINOPHIL NFR BLD AUTO: 0.5 % — SIGNIFICANT CHANGE UP (ref 0–6)
EOSINOPHIL NFR BLD AUTO: 1 % — SIGNIFICANT CHANGE UP (ref 0–6)
GLUCOSE BLDC GLUCOMTR-MCNC: 100 MG/DL — HIGH (ref 70–99)
GLUCOSE SERPL-MCNC: 111 MG/DL — HIGH (ref 70–99)
GLUCOSE SERPL-MCNC: 157 MG/DL — HIGH (ref 70–99)
HBV SURFACE AG SER-ACNC: NEGATIVE — SIGNIFICANT CHANGE UP
HCT VFR BLD CALC: 28.8 % — LOW (ref 39–50)
HCT VFR BLD CALC: 30.3 % — LOW (ref 39–50)
HGB BLD-MCNC: 8.6 G/DL — LOW (ref 13–17)
HGB BLD-MCNC: 8.9 G/DL — LOW (ref 13–17)
IMM GRANULOCYTES # BLD AUTO: 0.01 # — SIGNIFICANT CHANGE UP
IMM GRANULOCYTES # BLD AUTO: 0.02 # — SIGNIFICANT CHANGE UP
IMM GRANULOCYTES NFR BLD AUTO: 0.2 % — SIGNIFICANT CHANGE UP (ref 0–1.5)
IMM GRANULOCYTES NFR BLD AUTO: 0.3 % — SIGNIFICANT CHANGE UP (ref 0–1.5)
INR BLD: 1.95 — HIGH (ref 0.88–1.17)
LYMPHOCYTES # BLD AUTO: 1.64 K/UL — SIGNIFICANT CHANGE UP (ref 1–3.3)
LYMPHOCYTES # BLD AUTO: 1.79 K/UL — SIGNIFICANT CHANGE UP (ref 1–3.3)
LYMPHOCYTES # BLD AUTO: 25.8 % — SIGNIFICANT CHANGE UP (ref 13–44)
LYMPHOCYTES # BLD AUTO: 29.2 % — SIGNIFICANT CHANGE UP (ref 13–44)
MAGNESIUM SERPL-MCNC: 1.9 MG/DL — SIGNIFICANT CHANGE UP (ref 1.6–2.6)
MCHC RBC-ENTMCNC: 28.1 PG — SIGNIFICANT CHANGE UP (ref 27–34)
MCHC RBC-ENTMCNC: 28.6 PG — SIGNIFICANT CHANGE UP (ref 27–34)
MCHC RBC-ENTMCNC: 29.4 % — LOW (ref 32–36)
MCHC RBC-ENTMCNC: 29.9 % — LOW (ref 32–36)
MCV RBC AUTO: 94.1 FL — SIGNIFICANT CHANGE UP (ref 80–100)
MCV RBC AUTO: 97.4 FL — SIGNIFICANT CHANGE UP (ref 80–100)
MONOCYTES # BLD AUTO: 0.5 K/UL — SIGNIFICANT CHANGE UP (ref 0–0.9)
MONOCYTES # BLD AUTO: 0.58 K/UL — SIGNIFICANT CHANGE UP (ref 0–0.9)
MONOCYTES NFR BLD AUTO: 7.9 % — SIGNIFICANT CHANGE UP (ref 2–14)
MONOCYTES NFR BLD AUTO: 9.4 % — SIGNIFICANT CHANGE UP (ref 2–14)
NEUTROPHILS # BLD AUTO: 3.66 K/UL — SIGNIFICANT CHANGE UP (ref 1.8–7.4)
NEUTROPHILS # BLD AUTO: 4.16 K/UL — SIGNIFICANT CHANGE UP (ref 1.8–7.4)
NEUTROPHILS NFR BLD AUTO: 59.6 % — SIGNIFICANT CHANGE UP (ref 43–77)
NEUTROPHILS NFR BLD AUTO: 65.3 % — SIGNIFICANT CHANGE UP (ref 43–77)
NRBC # FLD: 0 — SIGNIFICANT CHANGE UP
NRBC # FLD: 0.02 — SIGNIFICANT CHANGE UP
PHOSPHATE SERPL-MCNC: 3.8 MG/DL — SIGNIFICANT CHANGE UP (ref 2.5–4.5)
PLATELET # BLD AUTO: 278 K/UL — SIGNIFICANT CHANGE UP (ref 150–400)
PLATELET # BLD AUTO: 283 K/UL — SIGNIFICANT CHANGE UP (ref 150–400)
PMV BLD: 9.5 FL — SIGNIFICANT CHANGE UP (ref 7–13)
PMV BLD: 9.5 FL — SIGNIFICANT CHANGE UP (ref 7–13)
POTASSIUM SERPL-MCNC: 3.6 MMOL/L — SIGNIFICANT CHANGE UP (ref 3.5–5.3)
POTASSIUM SERPL-MCNC: 4.9 MMOL/L — SIGNIFICANT CHANGE UP (ref 3.5–5.3)
POTASSIUM SERPL-SCNC: 3.6 MMOL/L — SIGNIFICANT CHANGE UP (ref 3.5–5.3)
POTASSIUM SERPL-SCNC: 4.9 MMOL/L — SIGNIFICANT CHANGE UP (ref 3.5–5.3)
PROT SERPL-MCNC: 5.9 G/DL — LOW (ref 6–8.3)
PROT SERPL-MCNC: 6 G/DL — SIGNIFICANT CHANGE UP (ref 6–8.3)
PROTHROM AB SERPL-ACNC: 22.7 SEC — HIGH (ref 9.8–13.1)
RBC # BLD: 3.06 M/UL — LOW (ref 4.2–5.8)
RBC # BLD: 3.11 M/UL — LOW (ref 4.2–5.8)
RBC # FLD: 15.5 % — HIGH (ref 10.3–14.5)
RBC # FLD: 15.6 % — HIGH (ref 10.3–14.5)
SODIUM SERPL-SCNC: 142 MMOL/L — SIGNIFICANT CHANGE UP (ref 135–145)
SODIUM SERPL-SCNC: 142 MMOL/L — SIGNIFICANT CHANGE UP (ref 135–145)
WBC # BLD: 6.14 K/UL — SIGNIFICANT CHANGE UP (ref 3.8–10.5)
WBC # BLD: 6.36 K/UL — SIGNIFICANT CHANGE UP (ref 3.8–10.5)
WBC # FLD AUTO: 6.14 K/UL — SIGNIFICANT CHANGE UP (ref 3.8–10.5)
WBC # FLD AUTO: 6.36 K/UL — SIGNIFICANT CHANGE UP (ref 3.8–10.5)

## 2018-02-08 PROCEDURE — 12345: CPT | Mod: NC

## 2018-02-08 PROCEDURE — 77001 FLUOROGUIDE FOR VEIN DEVICE: CPT | Mod: 26,GC

## 2018-02-08 PROCEDURE — 99223 1ST HOSP IP/OBS HIGH 75: CPT

## 2018-02-08 PROCEDURE — 36558 INSERT TUNNELED CV CATH: CPT

## 2018-02-08 PROCEDURE — 76937 US GUIDE VASCULAR ACCESS: CPT | Mod: 26

## 2018-02-08 RX ORDER — WARFARIN SODIUM 2.5 MG/1
1 TABLET ORAL
Qty: 0 | Refills: 0 | COMMUNITY

## 2018-02-08 RX ORDER — GLUCAGON INJECTION, SOLUTION 0.5 MG/.1ML
1 INJECTION, SOLUTION SUBCUTANEOUS ONCE
Qty: 0 | Refills: 0 | Status: DISCONTINUED | OUTPATIENT
Start: 2018-02-08 | End: 2018-02-15

## 2018-02-08 RX ORDER — DEXTROSE 50 % IN WATER 50 %
25 SYRINGE (ML) INTRAVENOUS ONCE
Qty: 0 | Refills: 0 | Status: DISCONTINUED | OUTPATIENT
Start: 2018-02-08 | End: 2018-02-15

## 2018-02-08 RX ORDER — DEXTROSE 50 % IN WATER 50 %
1 SYRINGE (ML) INTRAVENOUS ONCE
Qty: 0 | Refills: 0 | Status: DISCONTINUED | OUTPATIENT
Start: 2018-02-08 | End: 2018-02-15

## 2018-02-08 RX ORDER — SEVELAMER CARBONATE 2400 MG/1
1600 POWDER, FOR SUSPENSION ORAL
Qty: 0 | Refills: 0 | Status: DISCONTINUED | OUTPATIENT
Start: 2018-02-08 | End: 2018-02-15

## 2018-02-08 RX ORDER — AMIODARONE HYDROCHLORIDE 400 MG/1
200 TABLET ORAL DAILY
Qty: 0 | Refills: 0 | Status: DISCONTINUED | OUTPATIENT
Start: 2018-02-08 | End: 2018-02-15

## 2018-02-08 RX ORDER — DEXTROSE 50 % IN WATER 50 %
12.5 SYRINGE (ML) INTRAVENOUS ONCE
Qty: 0 | Refills: 0 | Status: DISCONTINUED | OUTPATIENT
Start: 2018-02-08 | End: 2018-02-15

## 2018-02-08 RX ORDER — INFLUENZA VIRUS VACCINE 15; 15; 15; 15 UG/.5ML; UG/.5ML; UG/.5ML; UG/.5ML
0.5 SUSPENSION INTRAMUSCULAR ONCE
Qty: 0 | Refills: 0 | Status: DISCONTINUED | OUTPATIENT
Start: 2018-02-08 | End: 2018-02-15

## 2018-02-08 RX ORDER — ERYTHROPOIETIN 10000 [IU]/ML
10000 INJECTION, SOLUTION INTRAVENOUS; SUBCUTANEOUS
Qty: 0 | Refills: 0 | Status: DISCONTINUED | OUTPATIENT
Start: 2018-02-08 | End: 2018-02-15

## 2018-02-08 RX ORDER — METOPROLOL TARTRATE 50 MG
25 TABLET ORAL
Qty: 0 | Refills: 0 | Status: DISCONTINUED | OUTPATIENT
Start: 2018-02-08 | End: 2018-02-15

## 2018-02-08 RX ORDER — WARFARIN SODIUM 2.5 MG/1
3.5 TABLET ORAL ONCE
Qty: 0 | Refills: 0 | Status: COMPLETED | OUTPATIENT
Start: 2018-02-08 | End: 2018-02-08

## 2018-02-08 RX ORDER — INSULIN LISPRO 100/ML
VIAL (ML) SUBCUTANEOUS EVERY 6 HOURS
Qty: 0 | Refills: 0 | Status: DISCONTINUED | OUTPATIENT
Start: 2018-02-08 | End: 2018-02-13

## 2018-02-08 RX ORDER — SODIUM CHLORIDE 9 MG/ML
1000 INJECTION, SOLUTION INTRAVENOUS
Qty: 0 | Refills: 0 | Status: DISCONTINUED | OUTPATIENT
Start: 2018-02-08 | End: 2018-02-15

## 2018-02-08 RX ADMIN — ERYTHROPOIETIN 10000 UNIT(S): 10000 INJECTION, SOLUTION INTRAVENOUS; SUBCUTANEOUS at 21:44

## 2018-02-08 RX ADMIN — WARFARIN SODIUM 3.5 MILLIGRAM(S): 2.5 TABLET ORAL at 20:55

## 2018-02-08 NOTE — PROGRESS NOTE ADULT - PROBLEM SELECTOR PROBLEM 3
Type 2 diabetes mellitus with chronic kidney disease on chronic dialysis, without long-term current use of insulin Chronic systolic congestive heart failure

## 2018-02-08 NOTE — CONSULT NOTE ADULT - SUBJECTIVE AND OBJECTIVE BOX
NEPHROLOGY - NSN    Patient seen and examined while in bed. Non-verbal, opens eyes to verbal stimuli.     HPI: 73 yo M with HTN, DM, CVA, ESRD on HD, COPD, A fib on coumadin, dementia sent [facility] after pt partially removed his permacath. Pt currently at baseline mental status; often tries to grab objects (pulled out multiple PIV since admission). Unable to obtain ROS/history due to mental status. His last dialysis was 2/5/18 at [facility]. Patient is in no acute distress. His electroyltes today are acceptable. No urgent need for dialysis at this time. Renal consulted for management of dialysis.     PAST MEDICAL & SURGICAL HISTORY:  CVA (cerebral vascular accident)  COPD (chronic obstructive pulmonary disease)  BPH (benign prostatic hyperplasia)  Bipolar affective disorder  CKD (chronic kidney disease)  Pancreatitis  Hypertension  Dyslipidemia  Diabetes mellitus  Coronary artery disease  HLD (hyperlipidemia)  Anemia  Acute renal failure  CKD (chronic kidney disease)  COPD (chronic obstructive pulmonary disease)  Fainting  Cardiac arrhythmia  Dizziness  Hydronephrosis  Cholecystitis  Bipolar 1 disorder  DM (diabetes mellitus)  HTN (hypertension)  Lumbar radiculopathy  Left heart failure  Reflux esophagitis  Coronary atherosclerosis  History of cardiac catheterization  No significant past surgical history      MEDICATIONS  (STANDING):  dextrose 5%. 1000 milliLiter(s) (50 mL/Hr) IV Continuous <Continuous>  dextrose 50% Injectable 12.5 Gram(s) IV Push once  dextrose 50% Injectable 25 Gram(s) IV Push once  dextrose 50% Injectable 25 Gram(s) IV Push once  influenza   Vaccine 0.5 milliLiter(s) IntraMuscular once  insulin lispro (HumaLOG) corrective regimen sliding scale   SubCutaneous every 6 hours      Allergies  No Known Allergies    SOCIAL HISTORY:  Denies alcohol abuse, drug abuse or tobacco usage    FAMILY HISTORY:  No pertinent family history in first degree relatives      REVIEW OF SYSTEMS:  Unable to obtain 2/2 mental status    PHYSICAL EXAM:  Constitutional: NAD, cachectic  HEENT: PERRLA    Neck:  No JVD  Respiratory: CTAB/L  Cardiovascular: S1 and S2  Gastrointestinal: BS+, soft, NT/ND  Extremities: No peripheral edema, contracted  Neurological: awake  Psychiatric: minimally responsive  : No Landa  Skin: No rashes  Access: R permacath partially removed, RUE AVF (+thrill)    VITAL:  T(C): , Max: 37.4 (02-08-18 @ 02:37)  T(F): , Max: 99.4 (02-08-18 @ 02:37)  HR: 83 (02-08-18 @ 05:15)  BP: 150/66 (02-08-18 @ 05:15)  BP(mean): --  RR: 98 (02-08-18 @ 05:15)  SpO2: 97% (02-08-18 @ 02:37)  Height (cm): 170.18 (02-08 @ 05:15)  Weight (kg): 62.5 (02-08 @ 05:15)  BMI (kg/m2): 21.6 (02-08 @ 05:15)  BSA (m2): 1.73 (02-08 @ 05:15)    LABS:                        8.6    6.36  )-----------( 278      ( 08 Feb 2018 06:30 )             28.8     02-08    142  |  98  |  53<H>  ----------------------------<  111<H>  3.6   |  28  |  4.34<H>    Ca    8.8      08 Feb 2018 06:30  Phos  3.8     02-08  Mg     1.9     02-08    TPro  5.9<L>  /  Alb  3.0<L>  /  TBili  0.2  /  DBili  x   /  AST  21  /  ALT  22  /  AlkPhos  90  02-08      RADIOLOGY & ADDITIONAL STUDIES:    < from: Xray Chest 1 View- PORTABLE-Urgent (02.07.18 @ 23:50) >    EXAM:  XR CHEST PORTABLE URGENT 1V        PROCEDURE DATE:  Feb 7 2018         INTERPRETATION:  CLINICAL INFORMATION: Evaluate hemodialysis catheter   placement.    EXAM: AP portable chest from 2/7/2018.    COMPARISON: Chest radiograph from 11/15/2017    FINDINGS:  Hemodialysis catheter overlies the upper chest with its tip in the region   of the right subclavian vein.  Clear lungs.  There is no evidence of pleural effusion or pneumothorax.  The cardiomediastinal silhouette is unchanged. Coronary stents.  Degenerative changes of the spine.    IMPRESSION:   Hemodialysis catheter overlies the upper chest with its tip in the region   of the right subclavian vein.  Clear lungs.              STEVE GARZA M.D., RADIOLOGY RESIDENT  This document has been electronically signed.  ANTONIA HARRISON M.D.; ATTENDING RADIOLOGIST  This document has been electronically signed. Feb  8 2018  6:06AM                  < end of copied text >

## 2018-02-08 NOTE — PROGRESS NOTE ADULT - PROBLEM SELECTOR PLAN 3
- Monitor finger stick  - ISS last echo in Sept, daughter deferred defibrillator in past given debilitated condition, possible repeat echo and EP eval

## 2018-02-08 NOTE — CONSULT NOTE ADULT - ASSESSMENT
RENAL - ESRD on HD MWF at [facility], last HD Monday (2/5)  VASCULAR - R permacath partially removed, RUE AVF ?immature, awaiting INR <1.5 for procedure per RN  VOLUME STATUS - Clinically euvolemic  HTN - BP acceptable  ELECTROLYTES - acceptable at this time    RECOMMENDATIONS  - vascular and/or IR follow up for insertion of permacath  - HD after insertion of permacath as ordered (spoke with HD RN)  - send labs with dialysis  - epogen 06073 units with HD   - renvela 1600 mg po tid with meals once no longer NPO (home dose)  - dose medication for a CrCl <10 cc/min  - no urgent need for dialysis at this time    Felicity Cifuentes, NP  [facility]  [phone] RENAL - ESRD on HD MWF at [facility], last HD Monday (2/5)  VASCULAR - R permacath partially removed, RUE AVF ?immature, awaiting INR <1.5 for procedure per RN  VOLUME STATUS - Clinically euvolemic  HTN - BP acceptable  ELECTROLYTES - acceptable at this time    RECOMMENDATIONS  - vascular and/or IR follow up for insertion of permacath  - HD after insertion of permacath as ordered (spoke with HD RN)  - send labs with dialysis  - epogen 69891 units with HD   - renvela 1600 mg po tid with meals once no longer NPO (home dose)  - dose medication for a CrCl <10 cc/min  - no urgent need for dialysis at this time    Felicity Cifuentes NP      ***************************RENAL ATTENDING****************************************************************  Seen/examined with NP. I agree with NP assessment as above.      General: Demented - minimally communicative; contracted; NAD  HEENT: NCAT, DMM  Pulm: CTA-b/l  Card: RRR s1s2  Abd: soft NTND  Ext: no c/c/e  Neuro: contracted x 4 extremities  Derm: no rash, no nevi  Access: (+)permacath RIJ (partially pulled out); RUE medial upper arm AVF (+)thrill but immature    ASSESSMENT:  (1)Renal - ESRD- HD MWF - no urgent need for HD today  (2)Vasc - permacath out of place; AVF is immature/not appropriate for use  (3)Anemia - receives Epogen with HD    RECOMMEND:  (1)Catheter change by IR  (2)Eventual AVF maturation as outpatient  (3)Next HD once catheter changed  (4)No needlesticks RUE  (5)Dose new meds for GFR<10/HD  (6)Renal diet  (7)Epogen with HD  (8)Renagel as taken at home    Wong Pablo MD  [facility]  [phone]        [facility],   [phone]

## 2018-02-08 NOTE — PROGRESS NOTE ADULT - PROBLEM SELECTOR PLAN 2
- Monitor BP  - c/w home meds d/w daughter - need to hold DAPT and coumadin for permacath replacement as noted above, pt at elevated risk for stroke if a/c held, aim for catheter exchange and resume a/c;  restart amio

## 2018-02-08 NOTE — H&P ADULT - ASSESSMENT
73 yo M with HTN, DM, CVA, ESRD on HD, COPD, A fib on coumadin, dementia sent from [facility] after pt partially removed his permacath.

## 2018-02-08 NOTE — ADVANCED PRACTICE NURSE CONSULT - RECOMMEDATIONS
Right lateral knee scab: Apply Betadine daily, allow to dry.    Bilateral buttock and groin: Apply Critic-aid clear twice a day and PRN.    Continue low air loss bed therapy, continue heel elevation with z-flex boots, continue to turn & reposition q2h, soft pillow between bony prominences, continue moisture management with barrier creams & single breathable pad, continue measures to decrease friction/shear/pressure.     Please call wound care service line is further assistance is needed ([phone]).

## 2018-02-08 NOTE — H&P ADULT - PROBLEM SELECTOR PLAN 1
- No emergent need for HD. Renal consult in am   - permacath slightly removed   - IR vs vasc sx  for replacement  - NPO pending IR  - Monitor CMP - No emergent need for HD. Renal consult in am   - permacath slightly removed   - IR vs vasc sx  for replacement  - NPO pending IR procedure- if not scheduled today will need to restart diet and home meds   - Monitor CMP

## 2018-02-08 NOTE — PROGRESS NOTE ADULT - PROBLEM SELECTOR PLAN 4
- Hold coumadin prior to IR procedure  - Restart when permacath replaced - Monitor BP  - c/w home meds significant h/o CVA in past, s/p CAC in Sept, with significant brain injury after - goal is to c/w DAPT/coumadin   - daughter feels pt making progress with rehab

## 2018-02-08 NOTE — PROGRESS NOTE ADULT - PROBLEM SELECTOR PROBLEM 6
Coronary artery disease involving native coronary artery of native heart without angina pectoris Chronic obstructive pulmonary disease, unspecified COPD type Essential hypertension

## 2018-02-08 NOTE — PROGRESS NOTE ADULT - PROBLEM SELECTOR PLAN 7
- with vascular dementia  - Enhanced supervision - Continue ASA and statin - Monitor finger stick  - ISS

## 2018-02-08 NOTE — H&P ADULT - NSHPPHYSICALEXAM_GEN_ALL_CORE
T(C): 37.4 (02-08-18 @ 02:37), Max: 37.4 (02-08-18 @ 02:37)  HR: 93 (02-08-18 @ 02:37) (76 - 93)  BP: 152/70 (02-08-18 @ 02:37) (122/67 - 152/70)  RR: 18 (02-08-18 @ 02:37) (16 - 18)  SpO2: 97% (02-08-18 @ 02:37) (97% - 100%)    GENERAL: Slightly agitated removing clothes, well-developed  HEAD:  Atraumatic, Normocephalic  ENT: EOMI, PERRLA, conjunctiva and sclera clear, Neck supple, No JVD, moist mucosa, no pharyngeal erythema, no tonsillar enlargement or exudate  CHEST/LUNG: Clear to auscultation bilaterally; No wheeze, equal breath sounds bilaterally, permacath on R chest wall partly removed   HEART: Regular rate and rhythm; No murmurs, rubs, or gallops  ABDOMEN: Soft, Nontender, Nondistended; Bowel sounds present, no organomegaly  EXTREMITIES:  2+ Peripheral Pulses, No clubbing, cyanosis, or edema  PSYCH: Pt non verbal, slightly agitated but not getting out of bed   NEUROLOGY: Awake, moving all extremities, does not follow any commands   SKIN: Normal color, No rashes or lesions, sacral decubitus ulcer unstageable

## 2018-02-08 NOTE — H&P ADULT - NSHPLABSRESULTS_GEN_ALL_CORE
.  LABS:                         8.9    6.14  )-----------( 283      ( 07 Feb 2018 23:30 )             30.3     02-07    142  |  99  |  50<H>  ----------------------------<  157<H>  4.9   |  28  |  3.95<H>    Ca    8.7      07 Feb 2018 23:30    TPro  6.0  /  Alb  2.8<L>  /  TBili  0.2  /  DBili  x   /  AST  58<H>  /  ALT  22  /  AlkPhos  84  02-07      RADIOLOGY, EKG & ADDITIONAL TESTS: Reviewed.

## 2018-02-08 NOTE — CHART NOTE - NSCHARTNOTEFT_GEN_A_CORE
PRE-INTERVENTIONAL RADIOLOGY PROCEDURE NOTE    Patient Age:  72  Patient Gender:  Male  Procedure (including site / side if known): Non - tunnelled HD catheter (plan for Permacath after ASA/Plavix held x 5 days)  Diagnosis / Indication:  ESRD on HD  Interventional Radiology Attending Physician: Bam Evans Attending Physician: Tay  Pertinent medical history: HTN, DM, CVA, ESRD on HD, COPD, A fib on coumadin, dementia   Pertinent labs:     02-08    142  |  98  |  53<H>  ----------------------------<  111<H>  3.6   |  28  |  4.34<H>    Ca    8.8      08 Feb 2018 06:30  Phos  3.8     02-08  Mg     1.9     02-08    TPro  5.9<L>  /  Alb  3.0<L>  /  TBili  0.2  /  DBili  x   /  AST  21  /  ALT  22  /  AlkPhos  90  02-08                        8.6    6.36  )-----------( 278      ( 08 Feb 2018 06:30 )             28.8   PT/INR - ( 08 Feb 2018 06:30 )   PT: 22.7 SEC;   INR: 1.95          PTT - ( 08 Feb 2018 06:30 )  PTT:39.1 SEC    Patient and Family aware? Yes      Eva Shaffer NP  [phone]

## 2018-02-08 NOTE — PROGRESS NOTE ADULT - PROBLEM SELECTOR PROBLEM 5
Chronic obstructive pulmonary disease, unspecified COPD type Type 2 diabetes mellitus with chronic kidney disease on chronic dialysis, without long-term current use of insulin Coronary artery disease involving native coronary artery of native heart without angina pectoris

## 2018-02-08 NOTE — PROGRESS NOTE ADULT - PROBLEM SELECTOR PROBLEM 7
Cerebrovascular accident (CVA), unspecified mechanism Coronary artery disease involving native coronary artery of native heart without angina pectoris Type 2 diabetes mellitus with chronic kidney disease on chronic dialysis, without long-term current use of insulin

## 2018-02-08 NOTE — PROGRESS NOTE ADULT - SUBJECTIVE AND OBJECTIVE BOX
Patient is a 72y old  Male who presents with a chief complaint of Removed permacath (08 Feb 2018 04:06)    SUBJECTIVE / OVERNIGHT EVENTS:  Pt calm over night.  Says his name.  In no distress.    MEDICATIONS  (STANDING):  dextrose 5%. 1000 milliLiter(s) (50 mL/Hr) IV Continuous <Continuous>  dextrose 50% Injectable 12.5 Gram(s) IV Push once  dextrose 50% Injectable 25 Gram(s) IV Push once  dextrose 50% Injectable 25 Gram(s) IV Push once  influenza   Vaccine 0.5 milliLiter(s) IntraMuscular once  insulin lispro (HumaLOG) corrective regimen sliding scale   SubCutaneous every 6 hours  sevelamer hydrochloride 1600 milliGRAM(s) Oral three times a day with meals    MEDICATIONS  (PRN):  dextrose Gel 1 Dose(s) Oral once PRN Blood Glucose LESS THAN 70 milliGRAM(s)/deciliter  glucagon  Injectable 1 milliGRAM(s) IntraMuscular once PRN Glucose LESS THAN 70 milligrams/deciliter    CAPILLARY BLOOD GLUCOSE  POCT Blood Glucose.: 113 mg/dL (08 Feb 2018 06:36)    Vital Signs Last 24 Hrs  T(C): 37.3 (08 Feb 2018 05:15), Max: 37.4 (08 Feb 2018 02:37)  T(F): 99.1 (08 Feb 2018 05:15), Max: 99.4 (08 Feb 2018 02:37)  HR: 83 (08 Feb 2018 05:15) (76 - 93)  BP: 150/66 (08 Feb 2018 05:15) (122/67 - 152/70)  RR: 98 (08 Feb 2018 05:15) (16 - 98)  SpO2: 97% (08 Feb 2018 02:37) (97% - 100%)    PHYSICAL EXAM:  GENERAL: chronically ill appearing elderly Syrian M  HEAD:  Atraumatic, Normocephalic  EYES: EOMI, PERRLA, conjunctiva and sclera clear  NECK: Supple, No JVD  CHEST/LUNG: Clear to auscultation bilaterally; No wheeze  HEART: Regular rate and rhythm; No murmurs, rubs, or gallops  ABDOMEN: Soft, Nontender, Nondistended; Bowel sounds present  EXTREMITIES:  2+ Peripheral Pulses, No clubbing, cyanosis, or edema  PSYCH: calm, oriented self only  SKIN: No rashes or lesions  HD catheter partially displaced, secured under R arm to chest wall    LABS:             8.6    6.36  )-----------( 278      ( 08 Feb 2018 06:30 )             28.8     142  |  98  |  53<H>  ----------------------------<  111<H>  3.6   |  28  |  4.34<H>    Ca    8.8      08 Feb 2018 06:30  Phos  3.8     02-08  Mg     1.9     02-08    TPro  5.9<L>  /  Alb  3.0<L>  /  TBili  0.2  /  DBili  x   /  AST  21  /  ALT  22  /  AlkPhos  90  02-08    PT/INR - ( 08 Feb 2018 06:30 )   PT: 22.7 SEC;   INR: 1.95     PTT - ( 08 Feb 2018 06:30 )  PTT:39.1 SEC    RADIOLOGY & ADDITIONAL TESTS:    Imaging Personally Reviewed:    Consultant(s) Notes Reviewed:      Care Discussed with Consultants/Other Providers: Patient is a 72y old  Male who presents with a chief complaint of Removed permacath (08 Feb 2018 04:06)    SUBJECTIVE / OVERNIGHT EVENTS:  Pt calm over night.  Says his name.  In no distress.    MEDICATIONS  (STANDING):  dextrose 5%. 1000 milliLiter(s) (50 mL/Hr) IV Continuous <Continuous>  dextrose 50% Injectable 12.5 Gram(s) IV Push once  dextrose 50% Injectable 25 Gram(s) IV Push once  dextrose 50% Injectable 25 Gram(s) IV Push once  influenza   Vaccine 0.5 milliLiter(s) IntraMuscular once  insulin lispro (HumaLOG) corrective regimen sliding scale   SubCutaneous every 6 hours  sevelamer hydrochloride 1600 milliGRAM(s) Oral three times a day with meals    MEDICATIONS  (PRN):  dextrose Gel 1 Dose(s) Oral once PRN Blood Glucose LESS THAN 70 milliGRAM(s)/deciliter  glucagon  Injectable 1 milliGRAM(s) IntraMuscular once PRN Glucose LESS THAN 70 milligrams/deciliter    CAPILLARY BLOOD GLUCOSE  POCT Blood Glucose.: 113 mg/dL (08 Feb 2018 06:36)    Vital Signs Last 24 Hrs  T(C): 37.3 (08 Feb 2018 05:15), Max: 37.4 (08 Feb 2018 02:37)  T(F): 99.1 (08 Feb 2018 05:15), Max: 99.4 (08 Feb 2018 02:37)  HR: 83 (08 Feb 2018 05:15) (76 - 93)  BP: 150/66 (08 Feb 2018 05:15) (122/67 - 152/70)  RR: 98 (08 Feb 2018 05:15) (16 - 98)  SpO2: 97% (08 Feb 2018 02:37) (97% - 100%)    PHYSICAL EXAM:  GENERAL: elderly Pakistani M, NAD  HEAD:  Atraumatic, Normocephalic  EYES: EOMI, PERRLA, conjunctiva and sclera clear  NECK: Supple, No JVD  CHEST/LUNG: Clear to auscultation bilaterally; No wheeze  HEART: Regular rate and rhythm; No murmurs, rubs, or gallops  ABDOMEN: Soft, Nontender, Nondistended; Bowel sounds present  EXTREMITIES:  2+ Peripheral Pulses, No clubbing, cyanosis, or edema  PSYCH: calm, oriented self only  SKIN: No rashes or lesions  HD catheter partially displaced, secured under R arm to chest wall    LABS:             8.6    6.36  )-----------( 278      ( 08 Feb 2018 06:30 )             28.8     142  |  98  |  53<H>  ----------------------------<  111<H>  3.6   |  28  |  4.34<H>    Ca    8.8      08 Feb 2018 06:30  Phos  3.8     02-08  Mg     1.9     02-08    TPro  5.9<L>  /  Alb  3.0<L>  /  TBili  0.2  /  DBili  x   /  AST  21  /  ALT  22  /  AlkPhos  90  02-08    PT/INR - ( 08 Feb 2018 06:30 )   PT: 22.7 SEC;   INR: 1.95     PTT - ( 08 Feb 2018 06:30 )  PTT:39.1 SEC    RADIOLOGY & ADDITIONAL TESTS:    Imaging Personally Reviewed:    Consultant(s) Notes Reviewed:      Care Discussed with Consultants/Other Providers: RN, SW, CM, ADS re overall care;  cards re cardiomyopathy

## 2018-02-08 NOTE — ED ADULT NURSE REASSESSMENT NOTE - NS ED NURSE REASSESS COMMENT FT1
Pt pulled out first IV, second IV placed to left hand (thumb area) 20G and wrapped, pt cleaned, changed, and repositioned, report given to [address] [place] [address], will continue to monitor. Awaiting transport.

## 2018-02-08 NOTE — PROGRESS NOTE ADULT - ASSESSMENT
72M dementia, CAD, s/p 12-14 stents placed in [id number], admitted for 3 months end of 2017 at [facility] (s/p CAC, MI, CVA, infections), chronic CHFrEF, DM, HTN, AF (coumadin), cerebrovascular accident, ESRD, radiculopathy with herniated disc, COPD, kidney stone with h/o urethral stents in the past, BPH, sent from [facility] after pt partially removed his permacath. 72M dementia, CAD, s/p 12-14 stents placed in [id number], admitted for 3 months end of 2017 at [facility] (s/p CAC, MI, CVA, infections), chronic CHFrEF, DM, HTN, AF (coumadin), CVAs, ESRD, radiculopathy with herniated disc, COPD, kidney stone with h/o urethral stents in the past, BPH, sent from [facility] after pt partially removed his permacath. 72M dementia, CAD, s/p 12-14 stents placed in [id number], admitted for 3 months end of 2017 at [facility] (s/p CAC, MI, CVA, infections), chronic CHFrEF, DM, HTN, AF (coumadin), CVAs, ESRD, radiculopathy with herniated disc, COPD, kidney stone with h/o urethral stents in the past, BPH, rehabing at [facility], sent in after pt partially removed his permacath.

## 2018-02-08 NOTE — H&P ADULT - PROBLEM SELECTOR PROBLEM 3
Type 2 diabetes mellitus with chronic kidney disease on chronic dialysis, without long-term current use of insulin

## 2018-02-08 NOTE — PROGRESS NOTE ADULT - PROBLEM SELECTOR PROBLEM 4
Atrial fibrillation, unspecified type Essential hypertension Cerebrovascular accident (CVA), unspecified mechanism

## 2018-02-08 NOTE — ADVANCED PRACTICE NURSE CONSULT - REASON FOR CONSULT
Patient seen on skin care rounds after wound care referral received for assessment of skin impairment and recommendations of topical management. Chart reviewed: Serum albumin 2.8g/dL, serum WBC 6.14, Alessio 13, BMI 21.6kg/m2. Patient H/O HTN, DM, CVA, ESRD on HD, COPD, Atrial fibrillation on coumadin, dementia. As per H&P, sent from [facility] after patient partially removed his permacath. Unable to obtain history from pt due to dementia. Admitted with ESRD.

## 2018-02-08 NOTE — H&P ADULT - HISTORY OF PRESENT ILLNESS
73 yo M with HTN, DM, CVA, ESRD on HD, COPD, A fib on coumadin, dementia sent from [facility] after pt partially removed his permacath. Unable to obtain history from pt due to dementia. Pt MS currently at baseline often tries to grab objects. He has not had any significant bleeding from catheter site. He was unable to get HD yesterdy as usually scheduled. He has not been SOB, no fevers or chills, no LE edema.    ED VS:  122/67  77  98.3  18  100% on RA

## 2018-02-08 NOTE — PROGRESS NOTE ADULT - PROBLEM SELECTOR PROBLEM 8
Decubitus ulcer of sacral region, unstageable Cerebrovascular accident (CVA), unspecified mechanism Chronic obstructive pulmonary disease, unspecified COPD type

## 2018-02-08 NOTE — PROGRESS NOTE ADULT - PROBLEM SELECTOR PLAN 1
- No emergent need for HD, f/u BMP. Renal consult, permacath slightly removed   - IR consult for replacement  - NPO pending IR procedure- if not scheduled today will need to restart diet and home meds - No emergent need for HD, f/u BMP. Renal consult, permacath slightly removed   - IR - Azucena, hold ASA/Plavix 5d for permacath - No emergent need for HD, f/u BMP. Renal consult, permacath slightly removed   - d/w Dr. Kuhn IR - pt at very high risk of recurrent stroke if DAPT and coumadin held.  stroke risk >> bleeding risk - I d/w daughter Anisa [phone] who understands and is agreeable to permacath exchange as soon as possible - No emergent need for HD, f/u BMP. Renal consult, permacath slightly removed   - d/w Dr. Kuhn IR - pt at very high risk of recurrent stroke if DAPT and coumadin held.  stroke risk >> bleeding risk - I d/w daughter Anisa [phone] who understands and is agreeable to permacath exchange as soon as possible  - d/w Dr. Pablo - AVF not mature, so must continue to use catheter - No emergent need for HD, f/u BMP. Renal consult, permacath slightly removed secured to chest wall by nursing  - d/w Dr. Kuhn IR - pt at very high risk of recurrent stroke if DAPT and coumadin held.  stroke risk >> bleeding risk - I d/w daughter Anisa [phone] who understands and is agreeable to permacath exchange as soon as possible  - d/w Dr. Pablo - AVF not mature, so must continue to use catheter

## 2018-02-08 NOTE — ADVANCED PRACTICE NURSE CONSULT - ASSESSMENT
A&Ox1, bedbound, right lower leg contracted, incontinent of stool, loose/brown, incontinence care provided. Skin warm, dry with increased moisture in intertriginous folds, adequate skin turgor. Blanchable erythema on bilateral heels. Skin tag noted on midline lower abdomen. Right lateral knee scab. Dry/scaly skin on feet.   Ecchymosis of left upper arm and right hand.    Incontinence associated dermatitis of bilateral buttock, hypo and chronic hyperpigmentation noted. Skin intact. Bilateral groin with erythema noted, skin intact. No candidiasis suspected.

## 2018-02-09 ENCOUNTER — TRANSCRIPTION ENCOUNTER (OUTPATIENT)
Age: 73
End: 2018-02-09

## 2018-02-09 LAB
B PERT DNA SPEC QL NAA+PROBE: SIGNIFICANT CHANGE UP
BASOPHILS # BLD AUTO: 0.02 K/UL — SIGNIFICANT CHANGE UP (ref 0–0.2)
BASOPHILS NFR BLD AUTO: 0.4 % — SIGNIFICANT CHANGE UP (ref 0–2)
BUN SERPL-MCNC: 21 MG/DL — SIGNIFICANT CHANGE UP (ref 7–23)
C PNEUM DNA SPEC QL NAA+PROBE: NOT DETECTED — SIGNIFICANT CHANGE UP
CALCIUM SERPL-MCNC: 8.5 MG/DL — SIGNIFICANT CHANGE UP (ref 8.4–10.5)
CHLORIDE SERPL-SCNC: 99 MMOL/L — SIGNIFICANT CHANGE UP (ref 98–107)
CO2 SERPL-SCNC: 31 MMOL/L — SIGNIFICANT CHANGE UP (ref 22–31)
CREAT SERPL-MCNC: 2.27 MG/DL — HIGH (ref 0.5–1.3)
EOSINOPHIL # BLD AUTO: 0.04 K/UL — SIGNIFICANT CHANGE UP (ref 0–0.5)
EOSINOPHIL NFR BLD AUTO: 0.8 % — SIGNIFICANT CHANGE UP (ref 0–6)
FLUAV H1 2009 PAND RNA SPEC QL NAA+PROBE: NOT DETECTED — SIGNIFICANT CHANGE UP
FLUAV H1 RNA SPEC QL NAA+PROBE: NOT DETECTED — SIGNIFICANT CHANGE UP
FLUAV H3 RNA SPEC QL NAA+PROBE: NOT DETECTED — SIGNIFICANT CHANGE UP
FLUAV SUBTYP SPEC NAA+PROBE: SIGNIFICANT CHANGE UP
FLUBV RNA SPEC QL NAA+PROBE: NOT DETECTED — SIGNIFICANT CHANGE UP
GLUCOSE BLDC GLUCOMTR-MCNC: 106 MG/DL — HIGH (ref 70–99)
GLUCOSE SERPL-MCNC: 84 MG/DL — SIGNIFICANT CHANGE UP (ref 70–99)
HADV DNA SPEC QL NAA+PROBE: NOT DETECTED — SIGNIFICANT CHANGE UP
HBA1C BLD-MCNC: 4.8 % — SIGNIFICANT CHANGE UP (ref 4–5.6)
HCOV 229E RNA SPEC QL NAA+PROBE: NOT DETECTED — SIGNIFICANT CHANGE UP
HCOV HKU1 RNA SPEC QL NAA+PROBE: NOT DETECTED — SIGNIFICANT CHANGE UP
HCOV NL63 RNA SPEC QL NAA+PROBE: NOT DETECTED — SIGNIFICANT CHANGE UP
HCOV OC43 RNA SPEC QL NAA+PROBE: NOT DETECTED — SIGNIFICANT CHANGE UP
HCT VFR BLD CALC: 32.3 % — LOW (ref 39–50)
HGB BLD-MCNC: 9.5 G/DL — LOW (ref 13–17)
HMPV RNA SPEC QL NAA+PROBE: NOT DETECTED — SIGNIFICANT CHANGE UP
HPIV1 RNA SPEC QL NAA+PROBE: NOT DETECTED — SIGNIFICANT CHANGE UP
HPIV2 RNA SPEC QL NAA+PROBE: NOT DETECTED — SIGNIFICANT CHANGE UP
HPIV3 RNA SPEC QL NAA+PROBE: NOT DETECTED — SIGNIFICANT CHANGE UP
HPIV4 RNA SPEC QL NAA+PROBE: NOT DETECTED — SIGNIFICANT CHANGE UP
IMM GRANULOCYTES # BLD AUTO: 0.01 # — SIGNIFICANT CHANGE UP
IMM GRANULOCYTES NFR BLD AUTO: 0.2 % — SIGNIFICANT CHANGE UP (ref 0–1.5)
INR BLD: 2.03 — HIGH (ref 0.88–1.17)
LYMPHOCYTES # BLD AUTO: 1.21 K/UL — SIGNIFICANT CHANGE UP (ref 1–3.3)
LYMPHOCYTES # BLD AUTO: 24.1 % — SIGNIFICANT CHANGE UP (ref 13–44)
M PNEUMO DNA SPEC QL NAA+PROBE: NOT DETECTED — SIGNIFICANT CHANGE UP
MAGNESIUM SERPL-MCNC: 1.8 MG/DL — SIGNIFICANT CHANGE UP (ref 1.6–2.6)
MCHC RBC-ENTMCNC: 27.9 PG — SIGNIFICANT CHANGE UP (ref 27–34)
MCHC RBC-ENTMCNC: 29.4 % — LOW (ref 32–36)
MCV RBC AUTO: 95 FL — SIGNIFICANT CHANGE UP (ref 80–100)
MONOCYTES # BLD AUTO: 0.41 K/UL — SIGNIFICANT CHANGE UP (ref 0–0.9)
MONOCYTES NFR BLD AUTO: 8.2 % — SIGNIFICANT CHANGE UP (ref 2–14)
NEUTROPHILS # BLD AUTO: 3.34 K/UL — SIGNIFICANT CHANGE UP (ref 1.8–7.4)
NEUTROPHILS NFR BLD AUTO: 66.3 % — SIGNIFICANT CHANGE UP (ref 43–77)
NRBC # FLD: 0 — SIGNIFICANT CHANGE UP
PHOSPHATE SERPL-MCNC: 2.6 MG/DL — SIGNIFICANT CHANGE UP (ref 2.5–4.5)
PLATELET # BLD AUTO: 263 K/UL — SIGNIFICANT CHANGE UP (ref 150–400)
PMV BLD: 9.3 FL — SIGNIFICANT CHANGE UP (ref 7–13)
POTASSIUM SERPL-MCNC: 4 MMOL/L — SIGNIFICANT CHANGE UP (ref 3.5–5.3)
POTASSIUM SERPL-SCNC: 4 MMOL/L — SIGNIFICANT CHANGE UP (ref 3.5–5.3)
PROTHROM AB SERPL-ACNC: 22.9 SEC — HIGH (ref 9.8–13.1)
RBC # BLD: 3.4 M/UL — LOW (ref 4.2–5.8)
RBC # FLD: 15.6 % — HIGH (ref 10.3–14.5)
RSV RNA SPEC QL NAA+PROBE: NOT DETECTED — SIGNIFICANT CHANGE UP
RV+EV RNA SPEC QL NAA+PROBE: NOT DETECTED — SIGNIFICANT CHANGE UP
SODIUM SERPL-SCNC: 141 MMOL/L — SIGNIFICANT CHANGE UP (ref 135–145)
WBC # BLD: 5.03 K/UL — SIGNIFICANT CHANGE UP (ref 3.8–10.5)
WBC # FLD AUTO: 5.03 K/UL — SIGNIFICANT CHANGE UP (ref 3.8–10.5)

## 2018-02-09 PROCEDURE — 99233 SBSQ HOSP IP/OBS HIGH 50: CPT

## 2018-02-09 RX ORDER — WARFARIN SODIUM 2.5 MG/1
3.5 TABLET ORAL ONCE
Qty: 0 | Refills: 0 | Status: COMPLETED | OUTPATIENT
Start: 2018-02-09 | End: 2018-02-09

## 2018-02-09 RX ORDER — DOXAZOSIN MESYLATE 4 MG
2 TABLET ORAL AT BEDTIME
Qty: 0 | Refills: 0 | Status: DISCONTINUED | OUTPATIENT
Start: 2018-02-09 | End: 2018-02-15

## 2018-02-09 RX ORDER — ERYTHROPOIETIN 10000 [IU]/ML
10000 INJECTION, SOLUTION INTRAVENOUS; SUBCUTANEOUS
Qty: 0 | Refills: 0 | COMMUNITY
Start: 2018-02-09

## 2018-02-09 RX ORDER — ASPIRIN/CALCIUM CARB/MAGNESIUM 324 MG
1 TABLET ORAL
Qty: 0 | Refills: 0 | COMMUNITY
Start: 2018-02-09

## 2018-02-09 RX ORDER — PANTOPRAZOLE SODIUM 20 MG/1
40 TABLET, DELAYED RELEASE ORAL
Qty: 0 | Refills: 0 | Status: DISCONTINUED | OUTPATIENT
Start: 2018-02-09 | End: 2018-02-15

## 2018-02-09 RX ORDER — BUDESONIDE AND FORMOTEROL FUMARATE DIHYDRATE 160; 4.5 UG/1; UG/1
2 AEROSOL RESPIRATORY (INHALATION)
Qty: 0 | Refills: 0 | Status: DISCONTINUED | OUTPATIENT
Start: 2018-02-09 | End: 2018-02-15

## 2018-02-09 RX ORDER — ACETAMINOPHEN 500 MG
650 TABLET ORAL EVERY 6 HOURS
Qty: 0 | Refills: 0 | Status: DISCONTINUED | OUTPATIENT
Start: 2018-02-09 | End: 2018-02-15

## 2018-02-09 RX ORDER — CLOPIDOGREL BISULFATE 75 MG/1
1 TABLET, FILM COATED ORAL
Qty: 0 | Refills: 0 | COMMUNITY
Start: 2018-02-09

## 2018-02-09 RX ORDER — CLOPIDOGREL BISULFATE 75 MG/1
75 TABLET, FILM COATED ORAL DAILY
Qty: 0 | Refills: 0 | Status: DISCONTINUED | OUTPATIENT
Start: 2018-02-09 | End: 2018-02-15

## 2018-02-09 RX ORDER — ASPIRIN/CALCIUM CARB/MAGNESIUM 324 MG
81 TABLET ORAL DAILY
Qty: 0 | Refills: 0 | Status: DISCONTINUED | OUTPATIENT
Start: 2018-02-09 | End: 2018-02-15

## 2018-02-09 RX ORDER — ATORVASTATIN CALCIUM 80 MG/1
40 TABLET, FILM COATED ORAL AT BEDTIME
Qty: 0 | Refills: 0 | Status: DISCONTINUED | OUTPATIENT
Start: 2018-02-09 | End: 2018-02-15

## 2018-02-09 RX ADMIN — Medication 2 DROP(S): at 18:12

## 2018-02-09 RX ADMIN — Medication 81 MILLIGRAM(S): at 18:12

## 2018-02-09 RX ADMIN — SEVELAMER CARBONATE 1600 MILLIGRAM(S): 2400 POWDER, FOR SUSPENSION ORAL at 13:07

## 2018-02-09 RX ADMIN — BUDESONIDE AND FORMOTEROL FUMARATE DIHYDRATE 2 PUFF(S): 160; 4.5 AEROSOL RESPIRATORY (INHALATION) at 22:45

## 2018-02-09 RX ADMIN — CLOPIDOGREL BISULFATE 75 MILLIGRAM(S): 75 TABLET, FILM COATED ORAL at 18:12

## 2018-02-09 RX ADMIN — AMIODARONE HYDROCHLORIDE 200 MILLIGRAM(S): 400 TABLET ORAL at 05:16

## 2018-02-09 RX ADMIN — Medication 650 MILLIGRAM(S): at 16:57

## 2018-02-09 RX ADMIN — ATORVASTATIN CALCIUM 40 MILLIGRAM(S): 80 TABLET, FILM COATED ORAL at 22:48

## 2018-02-09 RX ADMIN — SEVELAMER CARBONATE 1600 MILLIGRAM(S): 2400 POWDER, FOR SUSPENSION ORAL at 18:13

## 2018-02-09 RX ADMIN — Medication 5 MILLIGRAM(S): at 18:12

## 2018-02-09 RX ADMIN — Medication 25 MILLIGRAM(S): at 18:13

## 2018-02-09 RX ADMIN — WARFARIN SODIUM 3.5 MILLIGRAM(S): 2.5 TABLET ORAL at 18:23

## 2018-02-09 RX ADMIN — Medication 25 MILLIGRAM(S): at 05:16

## 2018-02-09 RX ADMIN — Medication 2 MILLIGRAM(S): at 23:32

## 2018-02-09 NOTE — DISCHARGE NOTE ADULT - CARE PLAN
Principal Discharge DX:	End stage renal failure on dialysis  Goal:	continue HD  Assessment and plan of treatment:	Perm-a-cath was exchanged and patient received successful HD with no signs of bleeding.  Secondary Diagnosis:	Essential hypertension  Assessment and plan of treatment:	Continue blood pressure medication regimen as directed. Monitor for any visual changes, headaches or dizziness.  Monitor blood pressure regularly.  Follow up with your PCP for further management for high blood pressure, please call to make appointment  Secondary Diagnosis:	Cerebrovascular accident (CVA), unspecified mechanism  Assessment and plan of treatment:	Continue coumadin. INR on discharge day 2.07. Follow up with your PCP for further evaluation and management. Please call to make an appointment within 1-2 weeks of discharge. Principal Discharge DX:	End stage renal failure on dialysis  Goal:	management  Assessment and plan of treatment:	Perm-a-cath was exchanged and patient received successful HD with no signs of bleeding.  AVF maturing, not ready for use yet.  Continue HD as directed per nephrologist.  Continue all medications.  Secondary Diagnosis:	Essential hypertension  Assessment and plan of treatment:	Continue blood pressure medication regimen as directed. Monitor for any visual changes, headaches or dizziness.  Monitor blood pressure regularly.  Follow up with your PCP for further management for high blood pressure, please call to make appointment  Secondary Diagnosis:	Cerebrovascular accident (CVA), unspecified mechanism  Assessment and plan of treatment:	Continue coumadin. INR on------------------------------. Follow up with your PCP for further evaluation and management. Please call to make an appointment within 1-2 weeks of discharge.  Secondary Diagnosis:	Influenza B  Goal:	resolution  Assessment and plan of treatment:	Continue tamiflu until completed.  Supportive care. Principal Discharge DX:	End stage renal failure on dialysis  Goal:	management  Assessment and plan of treatment:	Perm-a-cath was exchanged and patient received successful HD with no signs of bleeding.  AVF maturing, not ready for use yet.  Continue HD as directed per nephrologist.  Continue all medications.  Secondary Diagnosis:	Essential hypertension  Assessment and plan of treatment:	Continue blood pressure medication regimen as directed. Monitor for any visual changes, headaches or dizziness.  Monitor blood pressure regularly.  Follow up with your PCP for further management for high blood pressure, please call to make appointment  Secondary Diagnosis:	Cerebrovascular accident (CVA), unspecified mechanism  Assessment and plan of treatment:	Continue coumadin. INR on------------------------------. Follow up with your PCP for further evaluation and management. Please call to make an appointment within 1-2 weeks of discharge.  Secondary Diagnosis:	Influenza B  Goal:	resolution  Assessment and plan of treatment:	Tamiflu completed.  Supportive care. Principal Discharge DX:	End stage renal failure on dialysis  Goal:	management  Assessment and plan of treatment:	Perm-a-cath was exchanged and patient received successful HD with no signs of bleeding.  AVF maturing, not ready for use yet.  Continue HD as directed per nephrologist.  Continue all medications.  Secondary Diagnosis:	Essential hypertension  Assessment and plan of treatment:	Continue blood pressure medication regimen as directed. Monitor for any visual changes, headaches or dizziness.  Monitor blood pressure regularly.  Follow up with your PCP for further management for high blood pressure, please call to make appointment  Secondary Diagnosis:	Cerebrovascular accident (CVA), unspecified mechanism  Assessment and plan of treatment:	Continue coumadin Follow up with your PCP for further evaluation and management. Please call to make an appointment within 1-2 weeks of discharge.  Secondary Diagnosis:	Influenza B  Goal:	resolution  Assessment and plan of treatment:	Tamiflu completed.  Supportive care.

## 2018-02-09 NOTE — PROGRESS NOTE ADULT - PROBLEM SELECTOR PLAN 1
permacath exchanged without significant bleeding, had HD without problem  (AVF not mature)  pt with low grade temp - will monitor over night, for HD in am, check RVP, blood cx/CXR if spikes

## 2018-02-09 NOTE — PHYSICAL THERAPY INITIAL EVALUATION ADULT - BED MOBILITY LIMITATIONS, REHAB EVAL
Pt requires moderate-maximum assist x1 for trunk control while sitting edge of bed./decreased ability to use arms for pushing/pulling/decreased ability to use legs for bridging/pushing/impaired ability to control trunk for mobility

## 2018-02-09 NOTE — PROGRESS NOTE ADULT - SUBJECTIVE AND OBJECTIVE BOX
Minimally communicative      VITAL:  T(C): , Max: 37.3 (02-08-18 @ 13:01)  T(F): , Max: 99.2 (02-08-18 @ 13:01)  HR: 85 (02-09-18 @ 05:08)  BP: 139/78 (02-09-18 @ 05:08)  BP(mean): --  RR: 18 (02-09-18 @ 05:08)  SpO2: 100% (02-09-18 @ 05:08)      PHYSICAL EXAM:  Constitutional: NAD, cachectic  HEENT: PERRLA    Neck:  No JVD  Respiratory: CTAB/L  Cardiovascular: S1 and S2  Gastrointestinal: BS+, soft, NT/ND  Extremities: No peripheral edema, contracted  Neurological: awake  Psychiatric: minimally responsive  : No Landa  Skin: No rashes      LABS:                        9.5    5.03  )-----------( 263      ( 09 Feb 2018 06:30 )             32.3     Na(141)/K(4.0)/Cl(99)/HCO3(31)/BUN(21)/Cr(2.27)Glu(84)/Ca(8.5)/Mg(1.8)/PO4(2.6)    02-09 @ 06:30  Na(142)/K(3.6)/Cl(98)/HCO3(28)/BUN(53)/Cr(4.34)Glu(111)/Ca(8.8)/Mg(1.9)/PO4(3.8)    02-08 @ 06:30  Na(142)/K(4.9)/Cl(99)/HCO3(28)/BUN(50)/Cr(3.95)Glu(157)/Ca(8.7)/Mg(--)/PO4(--)    02-07 @ 23:30    IMPRESSION: 72M w/ HTN, DM, CVA, ESRD on HD, COPD, A fib on coumadin, and dementia, 2/7/18 from [place] w/ displaced tunneled dialysis catheter    (1)Renal - ESRD-HD usually MWF - last dialyzed yesterday (Thursday) - can plan for HD tomorrow and to revert to MWF HD as of this coming Monday (2/12)  (2)Vascular - s/p successful permacath change yesterday. Immature AVF.  (3)Anemia - on Epogen with HD    RECOMMEND:  (1)Next HD tomorrow; revert to MWF schedule on 2/12/17.  (2)AVF maturation as outpatient.          Wong Pablo MD  [facility]  [phone] Minimally communicative; brother at bedside      VITAL:  T(C): , Max: 37.3 (02-08-18 @ 13:01)  T(F): , Max: 99.2 (02-08-18 @ 13:01)  HR: 85 (02-09-18 @ 05:08)  BP: 139/78 (02-09-18 @ 05:08)  BP(mean): --  RR: 18 (02-09-18 @ 05:08)  SpO2: 100% (02-09-18 @ 05:08)      PHYSICAL EXAM:  Constitutional: NAD, cachectic  HEENT: NCAT, DMM  Neck:  No JVD  Respiratory: CTAB/L  Cardiovascular: S1 and S2  Gastrointestinal: BS+, soft, NT/ND  Extremities: No peripheral edema, contracted  Neurological: tone WNL  : No Landa  Skin: No rash  Access: (+)[facility]    LABS:                        9.5    5.03  )-----------( 263      ( 09 Feb 2018 06:30 )             32.3     Na(141)/K(4.0)/Cl(99)/HCO3(31)/BUN(21)/Cr(2.27)Glu(84)/Ca(8.5)/Mg(1.8)/PO4(2.6)    02-09 @ 06:30  Na(142)/K(3.6)/Cl(98)/HCO3(28)/BUN(53)/Cr(4.34)Glu(111)/Ca(8.8)/Mg(1.9)/PO4(3.8)    02-08 @ 06:30  Na(142)/K(4.9)/Cl(99)/HCO3(28)/BUN(50)/Cr(3.95)Glu(157)/Ca(8.7)/Mg(--)/PO4(--)    02-07 @ 23:30    IMPRESSION: 72M w/ HTN, DM, CVA, ESRD on HD, COPD, A fib on coumadin, and dementia, 2/7/18 from Que w/ displaced tunneled dialysis catheter    (1)Renal - ESRD-HD usually MWF - last dialyzed yesterday (Thursday) - can plan for HD tomorrow and to revert to MWF HD as of this coming Monday (2/12). [facility] tomorrow at 6:30pm.  (2)Vascular - s/p successful permacath change yesterday. Immature AVF.  (3)Anemia - on Epogen with HD    RECOMMEND:  (1)Next HD tomorrow at [facility] (630pm); revert to MWF schedule on 2/12/17.  (2)AVF maturation as outpatient.          Wong Pablo MD  [facility]  [phone]

## 2018-02-09 NOTE — DISCHARGE NOTE ADULT - PLAN OF CARE
continue HD Perm-a-cath was exchanged and patient received successful HD with no signs of bleeding. Continue blood pressure medication regimen as directed. Monitor for any visual changes, headaches or dizziness.  Monitor blood pressure regularly.  Follow up with your PCP for further management for high blood pressure, please call to make appointment Continue coumadin. INR on discharge day 2.07. Follow up with your PCP for further evaluation and management. Please call to make an appointment within 1-2 weeks of discharge. management Perm-a-cath was exchanged and patient received successful HD with no signs of bleeding.  AVF maturing, not ready for use yet.  Continue HD as directed per nephrologist.  Continue all medications. Continue coumadin. INR on------------------------------. Follow up with your PCP for further evaluation and management. Please call to make an appointment within 1-2 weeks of discharge. resolution Continue tamiflu until completed.  Supportive care. Tamiflu completed.  Supportive care. Continue coumadin Follow up with your PCP for further evaluation and management. Please call to make an appointment within 1-2 weeks of discharge.

## 2018-02-09 NOTE — PHYSICAL THERAPY INITIAL EVALUATION ADULT - PERTINENT HX OF CURRENT PROBLEM, REHAB EVAL
Pt admitted 2/8/2018 secondary to partially removed permacath by pt. Pt has h/o HTN, DM, ESRD, COPD, A-fib, dementia, CVA & MI in October 2017. (-) Chest x-ray. PT consult requested secondary to short term rehab.

## 2018-02-09 NOTE — PROGRESS NOTE ADULT - SUBJECTIVE AND OBJECTIVE BOX
Patient is a 72y old  Male who presents with a chief complaint of Removed permacath (09 Feb 2018 13:43)    SUBJECTIVE / OVERNIGHT EVENTS:  Permacath exchanged, no significant bleeding noted.  Tolerated HD.  Pt calm, no distress.    MEDICATIONS  (STANDING):  amiodarone    Tablet 200 milliGRAM(s) Oral daily  aspirin  chewable 81 milliGRAM(s) Oral daily  clopidogrel Tablet 75 milliGRAM(s) Oral daily  dextrose 5%. 1000 milliLiter(s) (50 mL/Hr) IV Continuous <Continuous>  dextrose 50% Injectable 12.5 Gram(s) IV Push once  dextrose 50% Injectable 25 Gram(s) IV Push once  dextrose 50% Injectable 25 Gram(s) IV Push once  epoetin georgie Injectable 09587 Unit(s) IV Push <User Schedule>  influenza   Vaccine 0.5 milliLiter(s) IntraMuscular once  insulin lispro (HumaLOG) corrective regimen sliding scale   SubCutaneous every 6 hours  metoprolol     tartrate 25 milliGRAM(s) Oral two times a day  sevelamer hydrochloride 1600 milliGRAM(s) Oral three times a day with meals  warfarin 3.5 milliGRAM(s) Oral once    MEDICATIONS  (PRN):  acetaminophen   Tablet 650 milliGRAM(s) Oral every 6 hours PRN For Temp greater than 38 C (100.4 F)  dextrose Gel 1 Dose(s) Oral once PRN Blood Glucose LESS THAN 70 milliGRAM(s)/deciliter  glucagon  Injectable 1 milliGRAM(s) IntraMuscular once PRN Glucose LESS THAN 70 milligrams/deciliter    CAPILLARY BLOOD GLUCOSE  POCT Blood Glucose.: 143 mg/dL (09 Feb 2018 16:20)  POCT Blood Glucose.: 120 mg/dL (09 Feb 2018 11:28)  POCT Blood Glucose.: 84 mg/dL (09 Feb 2018 06:27)  POCT Blood Glucose.: 106 mg/dL (09 Feb 2018 00:04)  POCT Blood Glucose.: 100 mg/dL (08 Feb 2018 18:32)    Vital Signs Last 24 Hrs  T(C): 38 (09 Feb 2018 14:55), Max: 38 (09 Feb 2018 14:55)  T(F): 100.4 (09 Feb 2018 14:55), Max: 100.4 (09 Feb 2018 14:55)  HR: 73 (09 Feb 2018 14:50) (72 - 85)  BP: 128/66 (09 Feb 2018 14:50) (123/60 - 145/96)  RR: 18 (09 Feb 2018 14:50) (17 - 18)  SpO2: 96% (09 Feb 2018 14:50) (96% - 100%)    PHYSICAL EXAM:  GENERAL: elderly [place] M, NAD  HEAD:  Atraumatic, Normocephalic  EYES: EOMI, PERRLA, conjunctiva and sclera clear  NECK: Supple, No JVD  CHEST/LUNG: Clear to auscultation bilaterally; No wheeze  HEART: Regular rate and rhythm; No murmurs, rubs, or gallops  ABDOMEN: Soft, Nontender, Nondistended; Bowel sounds present  EXTREMITIES:  2+ Peripheral Pulses, No clubbing, cyanosis, or edema  PSYCH: calm, oriented self only  SKIN: No rashes or lesions  HD catheter R CW in place    LABS:             9.5    5.03  )-----------( 263      ( 09 Feb 2018 06:30 )             32.3     141  |  99  |  21  ----------------------------<  84  4.0   |  31  |  2.27<H>    Ca    8.5      09 Feb 2018 06:30  Phos  2.6     02-09  Mg     1.8     02-09    TPro  5.9<L>  /  Alb  3.0<L>  /  TBili  0.2  /  DBili  x   /  AST  21  /  ALT  22  /  AlkPhos  90  02-08    PT/INR - ( 09 Feb 2018 06:30 )   PT: 22.9 SEC;   INR: 2.03     PTT - ( 08 Feb 2018 06:30 )  PTT:39.1 SEC    RADIOLOGY & ADDITIONAL TESTS:    Imaging Personally Reviewed:    Consultant(s) Notes Reviewed:      Care Discussed with Consultants/Other Providers: RN, SW, CM, ADS re overall care;  renal re HD

## 2018-02-09 NOTE — PHYSICAL THERAPY INITIAL EVALUATION ADULT - PATIENT PROFILE REVIEW, REHAB EVAL
yes/ACTIVITY: bedrest 1 hour post-procedure. Consulted with Felicity Cabrera, RN - pt is ready for PT evaluation.

## 2018-02-09 NOTE — PHYSICAL THERAPY INITIAL EVALUATION ADULT - ACTIVE RANGE OF MOTION EXAMINATION, REHAB EVAL
deficits as listed below/Pt has limited active ROM with bilateral UE for shoulder flexion (Right limited secondary to s/p permacath placement), UE elbow flexion/extension WNL. Bilateral LE active ROM limited with knee and hip extension; hip and knee flexion WNL deficits as listed below/Pt has limited active ROM with bilateral UE for shoulder flexion (Right limited secondary to s/p permacath placement 2/8/2018), UE elbow flexion/extension WNL. Bilateral LE active ROM limited with knee and hip extension; hip and knee flexion WNL

## 2018-02-09 NOTE — PHYSICAL THERAPY INITIAL EVALUATION ADULT - FOLLOWS COMMANDS/ANSWERS QUESTIONS, REHAB EVAL
75% of the time/able to follow single-step instructions/Pt was able to follow visual cues for commands, however pt was minimally verbal, even with brother who translated to Zimbabwean.

## 2018-02-09 NOTE — PROGRESS NOTE ADULT - SUBJECTIVE AND OBJECTIVE BOX
ANESTHESIA POSTOP CHECK    72y Male POSTOP DAY 1     Vital Signs Last 24 Hrs  T(C): 37.3 (09 Feb 2018 05:08), Max: 37.3 (08 Feb 2018 13:01)  T(F): 99.1 (09 Feb 2018 05:08), Max: 99.2 (08 Feb 2018 13:01)  HR: 85 (09 Feb 2018 05:08) (72 - 85)  BP: 139/78 (09 Feb 2018 05:08) (123/60 - 152/67)  BP(mean): --  RR: 18 (09 Feb 2018 05:08) (17 - 18)  SpO2: 100% (09 Feb 2018 05:08) (98% - 100%)  I&O's Summary    08 Feb 2018 07:01  -  09 Feb 2018 07:00  --------------------------------------------------------  IN: 500 mL / OUT: 1900 mL / NET: -1400 mL        [x ] NO APPARENT ANESTHESIA COMPLICATIONS      Comments: Discussed with RN

## 2018-02-09 NOTE — DISCHARGE NOTE ADULT - CARE PROVIDER_API CALL
Wong Pablo), Internal Medicine; Nephrology  [address] [address]  Phone: [phone]  Fax: [phone]

## 2018-02-09 NOTE — PHYSICAL THERAPY INITIAL EVALUATION ADULT - ADDITIONAL COMMENTS
As per pt's brother, prior to admission to [place], pt was independent with all ADLs and ambulated with a rolling walker. Pt had an aide 8 hours/day, 5 days/week for cooking, cleaning, errands and doctors appointments. Since being admitted to [place], pt initially had PT, however PT was D/C'd secondary to large sacral ulcer. Pt has been mainly bed- and wheelchair-bound for the past 2 months. As per brother, pt was transferred with mechanical lift from bed to wheelchair.     Pt was left supine in bed in NAD with all lines and tubes intact, call bell within reach, brother at bedside. GAYATRI brown.

## 2018-02-09 NOTE — DISCHARGE NOTE ADULT - HOSPITAL COURSE
72M dementia, CAD, s/p 12-14 stents placed in [id number], admitted for 3 months end of 2017 at [facility] (s/p CAC, MI, CVA, infections), chronic CHFrEF, DM, HTN, AF (coumadin), CVAs, ESRD, radiculopathy with herniated disc, COPD, kidney stone with h/o urethral stents in the past, BPH, rehabbing at [place], sent in after pt partially removed his permacath. Vascular exchanged permacath and patient receive HD through new cath.     Stable for d/c back to [facility] with HD set up for 2/10 72M dementia, CAD, s/p 12-14 stents placed in [id number], admitted for 3 months end of 2017 at [facility] (s/p CAC, MI, CVA, infections), chronic CHFrEF, DM, HTN, AF (coumadin), CVAs, ESRD, radiculopathy with herniated disc, COPD, kidney stone with h/o urethral stents in the past, BPH, rehabbing at [place], sent in after pt partially removed his permacath. Vascular exchanged permacath and patient receive HD through new cath.   Spiked fevers after permacath exchange - ID consulted. Pt was found flu B positive. Tamiflu, supportive care, fevers resolved.    Stable for d/c back to [facility] with HD set up for 2/10 72M dementia, CAD, s/p 12-14 stents placed in [id number], admitted for 3 months end of 2017 at [facility] (s/p CAC, MI, CVA, infections), chronic CHFrEF, DM, HTN, AF (coumadin), CVAs, ESRD, radiculopathy with herniated disc, COPD, kidney stone with h/o urethral stents in the past, BPH, rehabbing at [place], sent in after pt partially removed his permacath. Vascular exchanged permacath and patient receive HD through new cath.   Spiked fevers after permacath exchange - ID consulted. Pt was found flu B positive. Tamiflu, supportive care, fevers resolved.  PT recs rehab    Stable for d/c back to [facility] with HD 72M dementia, CAD, s/p 12-14 stents placed in [id number], admitted for 3 months end of 2017 at [facility] (s/p CAC, MI, CVA, infections), chronic CHFrEF, DM, HTN, AF (coumadin), CVAs, ESRD, radiculopathy with herniated disc, COPD, kidney stone with h/o urethral stents in the past, BPH, rehabbing at [place], sent in after pt partially removed his permacath. Vascular exchanged permacath and patient receive HD through new cath.   Spiked fevers after permacath exchange - ID consulted. Pt was found flu B positive. Tamiflu, supportive care, fevers resolved.  PT recs rehab    Stable for d/c back to [facility] with HD.

## 2018-02-09 NOTE — PROGRESS NOTE ADULT - ATTENDING COMMENTS
D/w daughter in detail re overall care, reinstated DNR/I, MOLST filled out. (daughter Anisa [phone]) appreciate WC consult, R lateral knee scab, buttock/groin IAD - Rx as recommended    D/w daughter in detail re overall care, reinstated DNR/I, MOLST filled out. (daughter Anisa [phone])

## 2018-02-09 NOTE — DISCHARGE NOTE ADULT - MEDICATION SUMMARY - MEDICATIONS TO TAKE
I will START or STAY ON the medications listed below when I get home from the hospital:    aspirin 81 mg oral tablet, chewable  -- 1 tab(s) by mouth once a day  -- Indication: For Cerebrovascular accident (CVA), unspecified mechanism    tamsulosin 0.4 mg oral capsule  -- 1 cap(s) by mouth once a day (at bedtime)  -- Indication: For bph    amiodarone 200 mg oral tablet  -- 1 tab(s) by mouth once a day  -- Indication: For Atrial fibrillation, unspecified type    Coumadin  -- 3.5 milligram(s) by mouth once a day (at bedtime)  -- Indication: For Atrial fibrillation, unspecified type    glipiZIDE 5 mg oral tablet  -- 1 tab(s) by mouth once a day  -- Indication: For Type 2 diabetes mellitus with chronic kidney disease on chronic dialysis, without long-term current use of insulin    atorvastatin 40 mg oral tablet  -- 1 tab(s) by mouth once a day (at bedtime)  -- Indication: For Cerebrovascular accident (CVA), unspecified mechanism    clopidogrel 75 mg oral tablet  -- 1 tab(s) by mouth once a day  -- Indication: For Cerebrovascular accident (CVA), unspecified mechanism    busPIRone 5 mg oral tablet  -- 1 tab(s) by mouth 2 times a day  -- Indication: For mood    metoprolol tartrate 25 mg oral tablet  -- 1 tab(s) by mouth 2 times a day  -- Indication: For Essential hypertension    Breo Ellipta 100 mcg-25 mcg/inh inhalation powder  -- 1 puff(s) inhaled once a day, home  -- Indication: For Chronic obstructive pulmonary disease, unspecified COPD type    DuoNeb 0.5 mg-2.5 mg/3 mL inhalation solution  -- 3 milliliter(s) inhaled 4 times a day, As Needed sob, wheezing  -- Indication: For Chronic obstructive pulmonary disease, unspecified COPD type    Silvadene 1% topical cream  -- Apply on skin to affected area 2 times a day to sacrum  -- Indication: For Decubitus ulcer of sacral region, unstageable    clotrimazole-betamethasone dipropionate 1%-0.05% topical cream  -- 1 application on skin 2 times a day  -- Indication: For Decubitus ulcer of sacral region, unstageable    epoetin georgie  -- 69073 unit(s) injectable 3 times a week with HD  -- Indication: For End stage renal failure on dialysis    ocular lubricant ophthalmic solution  -- 1 drop(s) to each affected eye 4 times a day  -- Indication: For Eye drops     sevelamer hydrochloride 800 mg oral tablet  -- 2 tab(s) by mouth 3 times a day (with meals)  -- Indication: For End stage renal failure on dialysis    pantoprazole 40 mg oral granule, enteric coated  -- 1 dose(s) by mouth once a day  -- Indication: For gerd    fluticasone CFC free 110 mcg/inh inhalation aerosol  -- 1 puff(s) inhaled once a day  -- Indication: For Chronic obstructive pulmonary disease, unspecified COPD type    Nephro-Rachel oral tablet  -- 1 tab(s) by mouth once a day  -- Indication: For End stage renal failure on dialysis    Vitamin D3 50,000 intl units oral capsule  -- 1 cap(s) by mouth once a month  -- Indication: For End stage renal failure on dialysis I will START or STAY ON the medications listed below when I get home from the hospital:    aspirin 81 mg oral tablet, chewable  -- 1 tab(s) by mouth once a day  -- Indication: For Cerebrovascular accident (CVA), unspecified mechanism    acetaminophen 325 mg oral tablet  -- 2 tab(s) by mouth every 6 hours, As needed, For Temp greater than 38 C (100.4 F)  -- Indication: For Fever, unspecified fever cause    tamsulosin 0.4 mg oral capsule  -- 1 cap(s) by mouth once a day (at bedtime)  -- Indication: For BPH    amiodarone 200 mg oral tablet  -- 1 tab(s) by mouth once a day  -- Indication: For Atrial fibrillation, unspecified type    Coumadin  -- 3.5 milligram(s) by mouth once a day (at bedtime)  -- Indication: For Atrial fibrillation, unspecified type    glipiZIDE 5 mg oral tablet  -- 1 tab(s) by mouth once a day  -- Indication: For Type 2 diabetes mellitus with chronic kidney disease on chronic dialysis, without long-term current use of insulin    atorvastatin 40 mg oral tablet  -- 1 tab(s) by mouth once a day (at bedtime)  -- Indication: For Cerebrovascular accident (CVA), unspecified mechanism    clopidogrel 75 mg oral tablet  -- 1 tab(s) by mouth once a day  -- Indication: For Cerebrovascular accident (CVA), unspecified mechanism    busPIRone 5 mg oral tablet  -- 1 tab(s) by mouth 2 times a day  -- Indication: For Mood Disorder    metoprolol tartrate 25 mg oral tablet  -- 1 tab(s) by mouth 2 times a day  -- Indication: For Essential hypertension    Breo Ellipta 100 mcg-25 mcg/inh inhalation powder  -- 1 puff(s) inhaled once a day, home  -- Indication: For Chronic obstructive pulmonary disease, unspecified COPD type    DuoNeb 0.5 mg-2.5 mg/3 mL inhalation solution  -- 3 milliliter(s) inhaled 4 times a day, As Needed sob, wheezing  -- Indication: For Chronic obstructive pulmonary disease, unspecified COPD type    Silvadene 1% topical cream  -- Apply on skin to affected area 2 times a day to sacrum  -- Indication: For Decubitus ulcer of sacral region, unstageable    clotrimazole-betamethasone dipropionate 1%-0.05% topical cream  -- 1 application on skin 2 times a day  -- Indication: For Decubitus ulcer of sacral region, unstageable    epoetin georgie  -- 85244 unit(s) injectable 3 times a week with HD  -- Indication: For End stage renal failure on dialysis    ocular lubricant ophthalmic solution  -- 1 drop(s) to each affected eye 4 times a day  -- Indication: For Eye drops     sevelamer hydrochloride 800 mg oral tablet  -- 2 tab(s) by mouth 3 times a day (with meals)  -- Indication: For End stage renal failure on dialysis    pantoprazole 40 mg oral granule, enteric coated  -- 1 dose(s) by mouth once a day  -- Indication: For GERD    fluticasone CFC free 110 mcg/inh inhalation aerosol  -- 1 puff(s) inhaled once a day  -- Indication: For Chronic obstructive pulmonary disease, unspecified COPD type    Nephro-Rachel oral tablet  -- 1 tab(s) by mouth once a day  -- Indication: For End stage renal failure on dialysis    Vitamin D3 50,000 intl units oral capsule  -- 1 cap(s) by mouth once a month  -- Indication: For End stage renal failure on dialysis I will START or STAY ON the medications listed below when I get home from the hospital:    aspirin 81 mg oral tablet, chewable  -- 1 tab(s) by mouth once a day  -- Indication: For Cerebrovascular accident (CVA), unspecified mechanism    acetaminophen 325 mg oral tablet  -- 2 tab(s) by mouth every 6 hours, As needed, For Temp greater than 38 C (100.4 F)  -- Indication: For Fever, unspecified fever cause    tamsulosin 0.4 mg oral capsule  -- 1 cap(s) by mouth once a day (at bedtime)  -- Indication: For BPH    amiodarone 200 mg oral tablet  -- 1 tab(s) by mouth once a day  -- Indication: For Atrial fibrillation, unspecified type    Coumadin  -- 3.5 milligram(s) by mouth once a day (at bedtime)  -- Indication: For Atrial fibrillation, unspecified type    glipiZIDE 5 mg oral tablet  -- 1 tab(s) by mouth once a day  -- Indication: For Type 2 diabetes mellitus with chronic kidney disease on chronic dialysis, without long-term current use of insulin    atorvastatin 40 mg oral tablet  -- 1 tab(s) by mouth once a day (at bedtime)  -- Indication: For Cerebrovascular accident (CVA), unspecified mechanism    clopidogrel 75 mg oral tablet  -- 1 tab(s) by mouth once a day  -- Indication: For Cerebrovascular accident (CVA), unspecified mechanism    busPIRone 5 mg oral tablet  -- 1 tab(s) by mouth 2 times a day  -- Indication: For Mood Disorder    metoprolol tartrate 25 mg oral tablet  -- 1 tab(s) by mouth 2 times a day  -- Indication: For Essential hypertension    Breo Ellipta 100 mcg-25 mcg/inh inhalation powder  -- 1 puff(s) inhaled once a day, home  -- Indication: For Chronic obstructive pulmonary disease, unspecified COPD type    DuoNeb 0.5 mg-2.5 mg/3 mL inhalation solution  -- 3 milliliter(s) inhaled 4 times a day, As Needed sob, wheezing  -- Indication: For Chronic obstructive pulmonary disease, unspecified COPD type    Silvadene 1% topical cream  -- Apply on skin to affected area 2 times a day to sacrum  -- Indication: For Decubitus ulcer of sacral region, unstageable    clotrimazole-betamethasone dipropionate 1%-0.05% topical cream  -- 1 application on skin 2 times a day  -- Indication: For Decubitus ulcer of sacral region, unstageable    epoetin georgie  -- 03610 unit(s) injectable 3 times a week with HD  -- Indication: For End stage renal failure on dialysis    ocular lubricant ophthalmic solution  -- 1 drop(s) to each affected eye 4 times a day  -- Indication: For Eye drops     sevelamer hydrochloride 800 mg oral tablet  -- 2 tab(s) by mouth 3 times a day (with meals)  -- Indication: For End stage renal failure on dialysis    pantoprazole 40 mg oral granule, enteric coated  -- 1 dose(s) by mouth once a day  -- Indication: For GERD    fluticasone CFC free 110 mcg/inh inhalation aerosol  -- 1 puff(s) inhaled once a day  -- Indication: For Chronic obstructive pulmonary disease, unspecified COPD type    Nephro-Rachel oral tablet  -- 1 tab(s) by mouth once a day  -- Indication: For End stage renal failure on dialysis    Vitamin D3 50,000 intl units oral capsule  -- 1 cap(s) by mouth once a month  -- Indication: For End stage renal failure on dialysis

## 2018-02-09 NOTE — PROGRESS NOTE ADULT - PROBLEM SELECTOR PLAN 4
significant h/o CVA in past, s/p CAC in Sept, with significant brain injury after, c/w DAPT/coumadin   - daughter feels pt making progress with rehab

## 2018-02-09 NOTE — DISCHARGE NOTE ADULT - PATIENT PORTAL LINK FT
You can access the [facility] Portal, offered by [facility], by registering with the following website: [email or web address]

## 2018-02-09 NOTE — PHYSICAL THERAPY INITIAL EVALUATION ADULT - LIVES WITH, PROFILE
alone/Pt is from [facility]. Pt was unable to participate in interview, pt's brother stated prior to rehab, pt lived alone in an apartment with elevator.

## 2018-02-09 NOTE — PROGRESS NOTE ADULT - ASSESSMENT
72M dementia, CAD, s/p 12-14 stents placed in [id number], admitted for 3 months end of 2017 at [facility] (s/p CAC, MI, CVA, infections), chronic CHFrEF, DM, HTN, AF (coumadin), CVAs, ESRD, radiculopathy with herniated disc, COPD, kidney stone with h/o urethral stents in the past, BPH, rehabing at [place], sent in after pt partially removed his permacath. Permacath replaced without problem.  Now with low grade temp

## 2018-02-09 NOTE — PHYSICAL THERAPY INITIAL EVALUATION ADULT - LEVEL OF INDEPENDENCE: SIT/STAND, REHAB EVAL
Deferred secondary to pt being bed- and wheel-chair bound for past 2 months, used of mechanical lift for transfers.

## 2018-02-10 DIAGNOSIS — R50.9 FEVER, UNSPECIFIED: ICD-10-CM

## 2018-02-10 LAB
BUN SERPL-MCNC: 29 MG/DL — HIGH (ref 7–23)
CALCIUM SERPL-MCNC: 8.3 MG/DL — LOW (ref 8.4–10.5)
CHLORIDE SERPL-SCNC: 96 MMOL/L — LOW (ref 98–107)
CO2 SERPL-SCNC: 27 MMOL/L — SIGNIFICANT CHANGE UP (ref 22–31)
CREAT SERPL-MCNC: 3.63 MG/DL — HIGH (ref 0.5–1.3)
GLUCOSE BLDC GLUCOMTR-MCNC: 106 MG/DL — HIGH (ref 70–99)
GLUCOSE BLDC GLUCOMTR-MCNC: 94 MG/DL — SIGNIFICANT CHANGE UP (ref 70–99)
GLUCOSE BLDC GLUCOMTR-MCNC: 98 MG/DL — SIGNIFICANT CHANGE UP (ref 70–99)
GLUCOSE BLDC GLUCOMTR-MCNC: 98 MG/DL — SIGNIFICANT CHANGE UP (ref 70–99)
GLUCOSE SERPL-MCNC: 107 MG/DL — HIGH (ref 70–99)
HCT VFR BLD CALC: 29.1 % — LOW (ref 39–50)
HGB BLD-MCNC: 8.5 G/DL — LOW (ref 13–17)
INR BLD: 2.89 — HIGH (ref 0.88–1.17)
MCHC RBC-ENTMCNC: 27.8 PG — SIGNIFICANT CHANGE UP (ref 27–34)
MCHC RBC-ENTMCNC: 29.2 % — LOW (ref 32–36)
MCV RBC AUTO: 95.1 FL — SIGNIFICANT CHANGE UP (ref 80–100)
NRBC # FLD: 0 — SIGNIFICANT CHANGE UP
PLATELET # BLD AUTO: 260 K/UL — SIGNIFICANT CHANGE UP (ref 150–400)
PMV BLD: 9.4 FL — SIGNIFICANT CHANGE UP (ref 7–13)
POTASSIUM SERPL-MCNC: 3.7 MMOL/L — SIGNIFICANT CHANGE UP (ref 3.5–5.3)
POTASSIUM SERPL-SCNC: 3.7 MMOL/L — SIGNIFICANT CHANGE UP (ref 3.5–5.3)
PROTHROM AB SERPL-ACNC: 34 SEC — HIGH (ref 9.8–13.1)
RBC # BLD: 3.06 M/UL — LOW (ref 4.2–5.8)
RBC # FLD: 15.4 % — HIGH (ref 10.3–14.5)
SODIUM SERPL-SCNC: 137 MMOL/L — SIGNIFICANT CHANGE UP (ref 135–145)
WBC # BLD: 7.66 K/UL — SIGNIFICANT CHANGE UP (ref 3.8–10.5)
WBC # FLD AUTO: 7.66 K/UL — SIGNIFICANT CHANGE UP (ref 3.8–10.5)

## 2018-02-10 PROCEDURE — 71045 X-RAY EXAM CHEST 1 VIEW: CPT | Mod: 26

## 2018-02-10 PROCEDURE — 99233 SBSQ HOSP IP/OBS HIGH 50: CPT

## 2018-02-10 RX ORDER — WARFARIN SODIUM 2.5 MG/1
3 TABLET ORAL ONCE
Qty: 0 | Refills: 0 | Status: COMPLETED | OUTPATIENT
Start: 2018-02-10 | End: 2018-02-10

## 2018-02-10 RX ADMIN — Medication 1 TABLET(S): at 13:00

## 2018-02-10 RX ADMIN — SEVELAMER CARBONATE 1600 MILLIGRAM(S): 2400 POWDER, FOR SUSPENSION ORAL at 13:01

## 2018-02-10 RX ADMIN — Medication 2 DROP(S): at 23:25

## 2018-02-10 RX ADMIN — Medication 25 MILLIGRAM(S): at 05:44

## 2018-02-10 RX ADMIN — Medication 2 DROP(S): at 17:52

## 2018-02-10 RX ADMIN — Medication 2 DROP(S): at 05:44

## 2018-02-10 RX ADMIN — Medication 1: at 17:53

## 2018-02-10 RX ADMIN — BUDESONIDE AND FORMOTEROL FUMARATE DIHYDRATE 2 PUFF(S): 160; 4.5 AEROSOL RESPIRATORY (INHALATION) at 13:00

## 2018-02-10 RX ADMIN — Medication 2 DROP(S): at 13:00

## 2018-02-10 RX ADMIN — BUDESONIDE AND FORMOTEROL FUMARATE DIHYDRATE 2 PUFF(S): 160; 4.5 AEROSOL RESPIRATORY (INHALATION) at 23:25

## 2018-02-10 RX ADMIN — Medication 81 MILLIGRAM(S): at 13:00

## 2018-02-10 RX ADMIN — ATORVASTATIN CALCIUM 40 MILLIGRAM(S): 80 TABLET, FILM COATED ORAL at 21:19

## 2018-02-10 RX ADMIN — WARFARIN SODIUM 3 MILLIGRAM(S): 2.5 TABLET ORAL at 17:52

## 2018-02-10 RX ADMIN — Medication 2 MILLIGRAM(S): at 21:19

## 2018-02-10 RX ADMIN — PANTOPRAZOLE SODIUM 40 MILLIGRAM(S): 20 TABLET, DELAYED RELEASE ORAL at 05:43

## 2018-02-10 RX ADMIN — CLOPIDOGREL BISULFATE 75 MILLIGRAM(S): 75 TABLET, FILM COATED ORAL at 13:00

## 2018-02-10 RX ADMIN — Medication 650 MILLIGRAM(S): at 00:45

## 2018-02-10 RX ADMIN — Medication 5 MILLIGRAM(S): at 17:52

## 2018-02-10 RX ADMIN — AMIODARONE HYDROCHLORIDE 200 MILLIGRAM(S): 400 TABLET ORAL at 05:44

## 2018-02-10 RX ADMIN — ERYTHROPOIETIN 10000 UNIT(S): 10000 INJECTION, SOLUTION INTRAVENOUS; SUBCUTANEOUS at 06:45

## 2018-02-10 RX ADMIN — Medication 2 DROP(S): at 00:45

## 2018-02-10 RX ADMIN — Medication 5 MILLIGRAM(S): at 05:44

## 2018-02-10 RX ADMIN — Medication 650 MILLIGRAM(S): at 15:02

## 2018-02-10 RX ADMIN — SEVELAMER CARBONATE 1600 MILLIGRAM(S): 2400 POWDER, FOR SUSPENSION ORAL at 17:52

## 2018-02-10 RX ADMIN — Medication 25 MILLIGRAM(S): at 17:52

## 2018-02-10 NOTE — PROGRESS NOTE ADULT - SUBJECTIVE AND OBJECTIVE BOX
Monitor fever curve. HD catheter exchanged.                           8.5    7.66  )-----------( 260      ( 10 Feb 2018 03:30 )             29.1                           8.5    7.66  )-----------( 260      ( 10 Feb 2018 03:30 )             29.1     02-10    137  |  96<L>  |  29<H>  ----------------------------<  107<H>  3.7   |  27  |  3.63<H>    Ca    8.3<L>      10 Feb 2018 03:30  Phos  2.6     02-  Mg     1.8     -        I&O's Detail    10 Feb 2018 07:  -  10 Feb 2018 16:43  --------------------------------------------------------  IN:    Other: 500 mL  Total IN: 500 mL    OUT:    Other: 1600 mL  Total OUT: 1600 mL    Total NET: -1100 mL          I&O's Summary    10 Feb 2018 07:  -  10 Feb 2018 16:43  --------------------------------------------------------  IN: 500 mL / OUT: 1600 mL / NET: -1100 mL        Daily     Daily Weight in k.9 (10 Feb 2018 11:32)    Vital Signs Last 24 Hrs  T(C): 38.2 (10 Feb 2018 15:01), Max: 38.4 (10 Feb 2018 00:42)  T(F): 100.7 (10 Feb 2018 15:01), Max: 101.2 (10 Feb 2018 00:42)  HR: 84 (10 Feb 2018 11:40) (69 - 84)  BP: 110/56 (10 Feb 2018 14:19) (108/57 - 125/62)  BP(mean): --  RR: 17 (10 Feb 2018 14:19) (17 - 18)  SpO2: 99% (10 Feb 2018 14:19) (96% - 99%)      MEDICATIONS  (STANDING):  amiodarone    Tablet 200 milliGRAM(s) Oral daily  artificial tears (preservative free) Ophthalmic Solution 2 Drop(s) Both EYES four times a day  aspirin  chewable 81 milliGRAM(s) Oral daily  atorvastatin 40 milliGRAM(s) Oral at bedtime  buDESOnide  80 MICROgram(s)/formoterol 4.5 MICROgram(s) Inhaler 2 Puff(s) Inhalation two times a day  busPIRone 5 milliGRAM(s) Oral two times a day  clopidogrel Tablet 75 milliGRAM(s) Oral daily  dextrose 5%. 1000 milliLiter(s) (50 mL/Hr) IV Continuous <Continuous>  dextrose 50% Injectable 12.5 Gram(s) IV Push once  dextrose 50% Injectable 25 Gram(s) IV Push once  dextrose 50% Injectable 25 Gram(s) IV Push once  doxazosin 2 milliGRAM(s) Oral at bedtime  epoetin georgie Injectable 91337 Unit(s) IV Push <User Schedule>  influenza   Vaccine 0.5 milliLiter(s) IntraMuscular once  insulin lispro (HumaLOG) corrective regimen sliding scale   SubCutaneous every 6 hours  metoprolol     tartrate 25 milliGRAM(s) Oral two times a day  Nephro-sophie 1 Tablet(s) Oral daily  pantoprazole    Tablet 40 milliGRAM(s) Oral before breakfast  sevelamer hydrochloride 1600 milliGRAM(s) Oral three times a day with meals  warfarin 3 milliGRAM(s) Oral once    MEDICATIONS  (PRN):  acetaminophen   Tablet 650 milliGRAM(s) Oral every 6 hours PRN For Temp greater than 38 C (100.4 F)  dextrose Gel 1 Dose(s) Oral once PRN Blood Glucose LESS THAN 70 milliGRAM(s)/deciliter  glucagon  Injectable 1 milliGRAM(s) IntraMuscular once PRN Glucose LESS THAN 70 milligrams/deciliter

## 2018-02-10 NOTE — PROGRESS NOTE ADULT - ASSESSMENT
72M dementia, CAD, s/p 12-14 stents placed in [id number], admitted for 3 months end of 2017 at [facility] (s/p CAC, MI, CVA, infections), chronic CHFrEF, DM, HTN, AF (coumadin), CVAs, ESRD, radiculopathy with herniated disc, COPD, kidney stone with h/o urethral stents in the past, BPH, rehabing at [place], sent in after pt partially removed his permacath. Permacath replaced without problem.  Now with low grade temp

## 2018-02-10 NOTE — PROGRESS NOTE ADULT - SUBJECTIVE AND OBJECTIVE BOX
CC: F/U for fever    SUBJECTIVE / OVERNIGHT EVENTS:  Patient unable to make his needs known.  Episode of T to 101.2 last night.  No hemodynamic compromise.  Low grade temp at 100.6 at HD today.    MEDICATIONS  (STANDING):  amiodarone    Tablet 200 milliGRAM(s) Oral daily  artificial tears (preservative free) Ophthalmic Solution 2 Drop(s) Both EYES four times a day  aspirin  chewable 81 milliGRAM(s) Oral daily  atorvastatin 40 milliGRAM(s) Oral at bedtime  buDESOnide  80 MICROgram(s)/formoterol 4.5 MICROgram(s) Inhaler 2 Puff(s) Inhalation two times a day  busPIRone 5 milliGRAM(s) Oral two times a day  clopidogrel Tablet 75 milliGRAM(s) Oral daily  dextrose 5%. 1000 milliLiter(s) (50 mL/Hr) IV Continuous <Continuous>  dextrose 50% Injectable 12.5 Gram(s) IV Push once  dextrose 50% Injectable 25 Gram(s) IV Push once  dextrose 50% Injectable 25 Gram(s) IV Push once  doxazosin 2 milliGRAM(s) Oral at bedtime  epoetin georgie Injectable 91801 Unit(s) IV Push <User Schedule>  influenza   Vaccine 0.5 milliLiter(s) IntraMuscular once  insulin lispro (HumaLOG) corrective regimen sliding scale   SubCutaneous every 6 hours  metoprolol     tartrate 25 milliGRAM(s) Oral two times a day  Nephro-sophie 1 Tablet(s) Oral daily  pantoprazole    Tablet 40 milliGRAM(s) Oral before breakfast  sevelamer hydrochloride 1600 milliGRAM(s) Oral three times a day with meals    MEDICATIONS  (PRN):  acetaminophen   Tablet 650 milliGRAM(s) Oral every 6 hours PRN For Temp greater than 38 C (100.4 F)  dextrose Gel 1 Dose(s) Oral once PRN Blood Glucose LESS THAN 70 milliGRAM(s)/deciliter  glucagon  Injectable 1 milliGRAM(s) IntraMuscular once PRN Glucose LESS THAN 70 milligrams/deciliter      Vital Signs Last 24 Hrs  T(C): 38.1 (10 Feb 2018 07:50), Max: 38.4 (10 Feb 2018 00:42)  T(F): 100.6 (10 Feb 2018 07:50), Max: 101.2 (10 Feb 2018 00:42)  HR: 74 (10 Feb 2018 07:50) (69 - 84)  BP: 117/59 (10 Feb 2018 07:50) (117/59 - 128/66)  BP(mean): --  RR: 18 (10 Feb 2018 07:50) (17 - 18)  SpO2: 96% (10 Feb 2018 05:36) (96% - 99%)  CAPILLARY BLOOD GLUCOSE      POCT Blood Glucose.: 98 mg/dL (10 Feb 2018 10:22)  POCT Blood Glucose.: 106 mg/dL (10 Feb 2018 06:48)  POCT Blood Glucose.: 98 mg/dL (10 Feb 2018 00:22)  POCT Blood Glucose.: 143 mg/dL (09 Feb 2018 16:20)  POCT Blood Glucose.: 120 mg/dL (09 Feb 2018 11:28)    I&O's Summary      PHYSICAL EXAM:  GENERAL: elderly Gabones[place] M, NAD  HEAD:  Atraumatic, Normocephalic  EYES: EOMI, PERRLA, conjunctiva and sclera clear  NECK: Supple, No JVD  CHEST/LUNG: Clear to auscultation bilaterally; No wheeze  HEART: Regular rate and rhythm; No murmurs, rubs, or gallops  ABDOMEN: Soft, Nontender, Nondistended; Bowel sounds present  EXTREMITIES:  2+ Peripheral Pulses, No clubbing, cyanosis, or edema  PSYCH: calm, oriented self only  SKIN: No rashes or lesions  HD catheter R CW in place    LABS:                        8.5    7.66  )-----------( 260      ( 10 Feb 2018 03:30 )             29.1     02-10    137  |  96<L>  |  29<H>  ----------------------------<  107<H>  3.7   |  27  |  3.63<H>    Ca    8.3<L>      10 Feb 2018 03:30  Phos  2.6     02-09  Mg     1.8     02-09      PT/INR - ( 10 Feb 2018 08:50 )   PT: 34.0 SEC;   INR: 2.89                    RADIOLOGY & ADDITIONAL TESTS:    Imaging Personally Reviewed:    Care Discussed with Consultants/Other Providers:

## 2018-02-10 NOTE — PROGRESS NOTE ADULT - PROBLEM SELECTOR PLAN 1
Infection work up - RVP negative.  WBC in nml range. Awaiting BCx.  Will obtain UA/UCx if patient is able to produce urine.  CXR unrevealing of PNA.  Low clinical suspicion for infection at this time. However if there is another episode of T>101, would start empiric treatment with broad spectrum antibiotics.

## 2018-02-10 NOTE — PROGRESS NOTE ADULT - ASSESSMENT
72M w/ HTN, DM, CVA, ESRD on HD, COPD, A fib on coumadin, and dementia, 2/7/18 from Que w/ displaced tunneled dialysis catheter    (1) ESRD-HD usually MWF - last dialyzed yesterday (Thursday)   (2)Vascular - s/p successful permacath change. Immature AVF.  (3)Anemia - Hgb trending down  (4)ID- Low grade fever    RECOMMEND:  (1)HD today, then back to MWF schedule on 2/12/17.  (2)AVF maturation as outpatient.  (3)continue epogen as ordered, trend HH  (4)monitor fever curve

## 2018-02-10 NOTE — CHART NOTE - NSCHARTNOTEFT_GEN_A_CORE
Pt with low grade fever- Blood cx sent - RVP neg   CXR done   No leuckocytosis noted - No signs of infection- Pt is S/P HD today   D/w DR Arzate- will hold of on ID c/s/ ABX until blood cx back or fever >102  If spikes fever may need ABX coverage- f/u blood cx

## 2018-02-11 LAB
B PERT DNA SPEC QL NAA+PROBE: SIGNIFICANT CHANGE UP
C PNEUM DNA SPEC QL NAA+PROBE: NOT DETECTED — SIGNIFICANT CHANGE UP
FLUAV H1 2009 PAND RNA SPEC QL NAA+PROBE: NOT DETECTED — SIGNIFICANT CHANGE UP
FLUAV H1 RNA SPEC QL NAA+PROBE: NOT DETECTED — SIGNIFICANT CHANGE UP
FLUAV H3 RNA SPEC QL NAA+PROBE: NOT DETECTED — SIGNIFICANT CHANGE UP
FLUAV SUBTYP SPEC NAA+PROBE: SIGNIFICANT CHANGE UP
FLUBV RNA SPEC QL NAA+PROBE: POSITIVE — HIGH
GLUCOSE BLDC GLUCOMTR-MCNC: 112 MG/DL — HIGH (ref 70–99)
GLUCOSE BLDC GLUCOMTR-MCNC: 113 MG/DL — HIGH (ref 70–99)
GLUCOSE BLDC GLUCOMTR-MCNC: 120 MG/DL — HIGH (ref 70–99)
GLUCOSE BLDC GLUCOMTR-MCNC: 129 MG/DL — HIGH (ref 70–99)
GLUCOSE BLDC GLUCOMTR-MCNC: 138 MG/DL — HIGH (ref 70–99)
HADV DNA SPEC QL NAA+PROBE: NOT DETECTED — SIGNIFICANT CHANGE UP
HCOV 229E RNA SPEC QL NAA+PROBE: NOT DETECTED — SIGNIFICANT CHANGE UP
HCOV HKU1 RNA SPEC QL NAA+PROBE: NOT DETECTED — SIGNIFICANT CHANGE UP
HCOV NL63 RNA SPEC QL NAA+PROBE: NOT DETECTED — SIGNIFICANT CHANGE UP
HCOV OC43 RNA SPEC QL NAA+PROBE: NOT DETECTED — SIGNIFICANT CHANGE UP
HMPV RNA SPEC QL NAA+PROBE: NOT DETECTED — SIGNIFICANT CHANGE UP
HPIV1 RNA SPEC QL NAA+PROBE: NOT DETECTED — SIGNIFICANT CHANGE UP
HPIV2 RNA SPEC QL NAA+PROBE: NOT DETECTED — SIGNIFICANT CHANGE UP
HPIV3 RNA SPEC QL NAA+PROBE: NOT DETECTED — SIGNIFICANT CHANGE UP
HPIV4 RNA SPEC QL NAA+PROBE: NOT DETECTED — SIGNIFICANT CHANGE UP
INR BLD: 3.17 — HIGH (ref 0.88–1.17)
M PNEUMO DNA SPEC QL NAA+PROBE: NOT DETECTED — SIGNIFICANT CHANGE UP
PROTHROM AB SERPL-ACNC: 36 SEC — HIGH (ref 9.8–13.1)
RSV RNA SPEC QL NAA+PROBE: NOT DETECTED — SIGNIFICANT CHANGE UP
RV+EV RNA SPEC QL NAA+PROBE: NOT DETECTED — SIGNIFICANT CHANGE UP
SPECIMEN SOURCE: SIGNIFICANT CHANGE UP

## 2018-02-11 PROCEDURE — 99222 1ST HOSP IP/OBS MODERATE 55: CPT | Mod: GC

## 2018-02-11 PROCEDURE — 99233 SBSQ HOSP IP/OBS HIGH 50: CPT

## 2018-02-11 RX ORDER — WARFARIN SODIUM 2.5 MG/1
1.5 TABLET ORAL ONCE
Qty: 0 | Refills: 0 | Status: COMPLETED | OUTPATIENT
Start: 2018-02-11 | End: 2018-02-11

## 2018-02-11 RX ADMIN — BUDESONIDE AND FORMOTEROL FUMARATE DIHYDRATE 2 PUFF(S): 160; 4.5 AEROSOL RESPIRATORY (INHALATION) at 21:23

## 2018-02-11 RX ADMIN — Medication 25 MILLIGRAM(S): at 05:38

## 2018-02-11 RX ADMIN — CLOPIDOGREL BISULFATE 75 MILLIGRAM(S): 75 TABLET, FILM COATED ORAL at 13:06

## 2018-02-11 RX ADMIN — WARFARIN SODIUM 1.5 MILLIGRAM(S): 2.5 TABLET ORAL at 17:42

## 2018-02-11 RX ADMIN — Medication 2 DROP(S): at 13:07

## 2018-02-11 RX ADMIN — SEVELAMER CARBONATE 1600 MILLIGRAM(S): 2400 POWDER, FOR SUSPENSION ORAL at 08:19

## 2018-02-11 RX ADMIN — Medication 25 MILLIGRAM(S): at 17:42

## 2018-02-11 RX ADMIN — Medication 30 MILLIGRAM(S): at 17:42

## 2018-02-11 RX ADMIN — Medication 2 MILLIGRAM(S): at 21:23

## 2018-02-11 RX ADMIN — Medication 5 MILLIGRAM(S): at 05:38

## 2018-02-11 RX ADMIN — SEVELAMER CARBONATE 1600 MILLIGRAM(S): 2400 POWDER, FOR SUSPENSION ORAL at 13:07

## 2018-02-11 RX ADMIN — Medication 2 DROP(S): at 05:38

## 2018-02-11 RX ADMIN — Medication 1 TABLET(S): at 13:06

## 2018-02-11 RX ADMIN — Medication 5 MILLIGRAM(S): at 17:42

## 2018-02-11 RX ADMIN — ATORVASTATIN CALCIUM 40 MILLIGRAM(S): 80 TABLET, FILM COATED ORAL at 21:23

## 2018-02-11 RX ADMIN — Medication 81 MILLIGRAM(S): at 13:06

## 2018-02-11 RX ADMIN — BUDESONIDE AND FORMOTEROL FUMARATE DIHYDRATE 2 PUFF(S): 160; 4.5 AEROSOL RESPIRATORY (INHALATION) at 08:19

## 2018-02-11 RX ADMIN — Medication 2 DROP(S): at 17:43

## 2018-02-11 RX ADMIN — Medication 2 DROP(S): at 23:41

## 2018-02-11 RX ADMIN — AMIODARONE HYDROCHLORIDE 200 MILLIGRAM(S): 400 TABLET ORAL at 05:38

## 2018-02-11 RX ADMIN — PANTOPRAZOLE SODIUM 40 MILLIGRAM(S): 20 TABLET, DELAYED RELEASE ORAL at 05:38

## 2018-02-11 NOTE — PROGRESS NOTE ADULT - PROBLEM SELECTOR PLAN 1
Infection work up - RVP negative.  WBC in nml range. Awaiting peripheral BCx and BCx from [facility].  Will obtain UA/UCx if patient is able to produce urine.  CXR unrevealing of PNA.  Appreciate ID input.  Monitor off Abx at this time.

## 2018-02-11 NOTE — CONSULT NOTE ADULT - ASSESSMENT
72 male with DM, ESRD on HD, HTN, CVA, COPD, A fib on coumadin, dementia here with fevers after Permacath exchange. In oct 2017 his blood cultures grew coagulase negative staphylococcus and bacillus species- treated at the time with vancomycin for 14 days and HD catheter removal.    1- fevers   - likely HD catheter related since they started right after it was exchanged  - check repeat blood cultures  - if fevers continue or blood cultures grow organisms would consider changing the catheter   - monitor off antibiotics for now 72 male with DM, ESRD on HD, HTN, CVA, COPD, A fib on coumadin, dementia here with fevers after Permacath exchange.   In oct 2017 his blood cultures grew coagulase negative staphylococcus and bacillus species- treated at the time with vancomycin for 14 days and HD catheter removal.    1- fevers   - likely HD catheter related since they started right after it was exchanged  - check repeat blood cultures  - if fevers continue or blood cultures grow organisms would consider changing the catheter   - monitor off antibiotics for now

## 2018-02-11 NOTE — PROGRESS NOTE ADULT - ASSESSMENT
72M w/ HTN, DM, CVA, ESRD on HD, COPD, A fib on coumadin, and dementia, 2/7/18 from Que w/ displaced tunneled dialysis catheter. S/p HD catheter exchange, now + Influenza B.    (1) ESRD-HD usually MWF  (2)Vascular - s/p successful permacath change. Immature AVF.  (3)Anemia - Hgb trending down yesterday  (4)ID- + influenza B    RECOMMEND:  (1)HD tomorrow, may draw blood cultures with HD  (2)trend H/H, continue epogen as ordered  (3)continue tamiflu as ordered, droplet precautions    family updated at bedside

## 2018-02-11 NOTE — PROGRESS NOTE ADULT - ASSESSMENT
72M dementia, CAD, s/p 12-14 stents placed in [id number], admitted for 3 months end of 2017 at [facility] (s/p CAC, MI, CVA, infections), chronic CHFrEF, DM, HTN, AF (coumadin), CVAs, ESRD, radiculopathy with herniated disc, COPD, kidney stone with h/o urethral stents in the past, BPH, rehabing at [place], sent in after pt partially removed his permacath. Permacath replaced without problem.  Now with low grade temp suspect vascular catheter infection.

## 2018-02-11 NOTE — CONSULT NOTE ADULT - ATTENDING COMMENTS
72M s/p recent HD cathter placement with fever and cough  -repeat RVP  -F/u bcx  -HD catheter site looks ok  -hold off abx  -CXR clear    Sean Gallego  Attending Physician   Division of Infectious Disease  Pager #[phone]  After 5pm/weekend or no response, call #[phone]

## 2018-02-11 NOTE — CHART NOTE - NSCHARTNOTEFT_GEN_A_CORE
d/w ID ok to send blood cx via permacath on Monday when pt goes to HD.  D/w medical attending no need to repeat peripheral blood cx as pts has repeat cultures from 10th as per medical attending

## 2018-02-11 NOTE — PROGRESS NOTE ADULT - SUBJECTIVE AND OBJECTIVE BOX
CC: F/U for fever    SUBJECTIVE / OVERNIGHT EVENTS:  Continues to have low grade temperature in the evening.  No N/V, CP, SOB, Cough, lightheadedness, dizziness, abdominal pain, diarrhea, dysuria.    MEDICATIONS  (STANDING):  amiodarone    Tablet 200 milliGRAM(s) Oral daily  artificial tears (preservative free) Ophthalmic Solution 2 Drop(s) Both EYES four times a day  aspirin  chewable 81 milliGRAM(s) Oral daily  atorvastatin 40 milliGRAM(s) Oral at bedtime  buDESOnide  80 MICROgram(s)/formoterol 4.5 MICROgram(s) Inhaler 2 Puff(s) Inhalation two times a day  busPIRone 5 milliGRAM(s) Oral two times a day  clopidogrel Tablet 75 milliGRAM(s) Oral daily  dextrose 5%. 1000 milliLiter(s) (50 mL/Hr) IV Continuous <Continuous>  dextrose 50% Injectable 12.5 Gram(s) IV Push once  dextrose 50% Injectable 25 Gram(s) IV Push once  dextrose 50% Injectable 25 Gram(s) IV Push once  doxazosin 2 milliGRAM(s) Oral at bedtime  epoetin georgie Injectable 94594 Unit(s) IV Push <User Schedule>  influenza   Vaccine 0.5 milliLiter(s) IntraMuscular once  insulin lispro (HumaLOG) corrective regimen sliding scale   SubCutaneous every 6 hours  metoprolol     tartrate 25 milliGRAM(s) Oral two times a day  Nephro-sophie 1 Tablet(s) Oral daily  pantoprazole    Tablet 40 milliGRAM(s) Oral before breakfast  sevelamer hydrochloride 1600 milliGRAM(s) Oral three times a day with meals    MEDICATIONS  (PRN):  acetaminophen   Tablet 650 milliGRAM(s) Oral every 6 hours PRN For Temp greater than 38 C (100.4 F)  dextrose Gel 1 Dose(s) Oral once PRN Blood Glucose LESS THAN 70 milliGRAM(s)/deciliter  glucagon  Injectable 1 milliGRAM(s) IntraMuscular once PRN Glucose LESS THAN 70 milligrams/deciliter      Vital Signs Last 24 Hrs  T(C): 36.6 (11 Feb 2018 05:25), Max: 38.2 (10 Feb 2018 15:01)  T(F): 97.9 (11 Feb 2018 05:25), Max: 100.7 (10 Feb 2018 15:01)  HR: 80 (11 Feb 2018 05:25) (71 - 84)  BP: 114/79 (11 Feb 2018 05:25) (108/57 - 125/62)  BP(mean): --  RR: 18 (11 Feb 2018 05:25) (16 - 18)  SpO2: 97% (11 Feb 2018 05:25) (97% - 100%)  CAPILLARY BLOOD GLUCOSE  170 (10 Feb 2018 17:07)      POCT Blood Glucose.: 112 mg/dL (11 Feb 2018 06:27)  POCT Blood Glucose.: 120 mg/dL (11 Feb 2018 00:23)  POCT Blood Glucose.: 94 mg/dL (10 Feb 2018 11:41)  POCT Blood Glucose.: 98 mg/dL (10 Feb 2018 10:22)    I&O's Summary    10 Feb 2018 07:01  -  11 Feb 2018 07:00  --------------------------------------------------------  IN: 500 mL / OUT: 1600 mL / NET: -1100 mL        PHYSICAL EXAM:  GENERAL: elderly [place] M, NAD  HEAD:  Atraumatic, Normocephalic  EYES: EOMI, PERRLA, conjunctiva and sclera clear  NECK: Supple, No JVD  CHEST/LUNG: Clear to auscultation bilaterally; No wheeze  HEART: Regular rate and rhythm; No murmurs, rubs, or gallops  ABDOMEN: Soft, Nontender, Nondistended; Bowel sounds present  EXTREMITIES:  2+ Peripheral Pulses, No clubbing, cyanosis, or edema  PSYCH: calm, oriented self only  SKIN: No rashes or lesions  HD catheter R CW in place    LABS:                        8.5    7.66  )-----------( 260      ( 10 Feb 2018 03:30 )             29.1     02-10    137  |  96<L>  |  29<H>  ----------------------------<  107<H>  3.7   |  27  |  3.63<H>    Ca    8.3<L>      10 Feb 2018 03:30      PT/INR - ( 11 Feb 2018 06:30 )   PT: 36.0 SEC;   INR: 3.17          Culture - Blood (collected 10 Feb 2018 03:56)  Source: BLOOD PERIPHERAL  Preliminary Report (11 Feb 2018 03:55):    NO ORGANISMS ISOLATED    NO ORGANISMS ISOLATED AT 24 HOURS                RADIOLOGY & ADDITIONAL TESTS:    Imaging Personally Reviewed:    Care Discussed with Consultants/Other Providers:

## 2018-02-11 NOTE — CONSULT NOTE ADULT - SUBJECTIVE AND OBJECTIVE BOX
Infectious Diseases Consult note  Patient is a 72y old  Male who presents with a chief complaint of Removed permacath (2018 13:43)    DAVIDSON BUCHANAN-[id number]      HPI:  72 male with HTN, DM, CVA, ESRD on HD, COPD, A fib on coumadin, dementia sent from [place] after pt partially removed his permacath. Unable to obtain history from pt due to dementia. Pt MS currently at baseline often tries to grab objects. He has not had any significant bleeding from catheter site. He was unable to get HD yesterdy as usually scheduled. He has not been SOB, no fevers or chills, no LE edema.    Tmax=    WBC=     ESR=   CRP=    Patient has been afebrile with no leukocytosis, no hypotension and no tachycardia. Patient's UA has >50 cells, large leukocyte esterase and negative nitrate. RVP is negative. Urine legionella is negative. Normal Lactate. Normal Liver function tests Bili/ALKP/ AST/ALT. Normal renal clearance. CXR shows clear lungs. Blood cultures show no growth to date. Urine cultures show now growth to date. Sputum cultures showed no growth to date. Wounds cultures showed no growth to date. Patient was started on             No sick contacts. No recent travel. No recent antibiotics.       REVIEW OF SYSTEMS  All as below unless noted otherwise    General:  Denies fever and chills. 	  Ophthalmologic: Denies any visual complaints. 	  ENMT: No throat pain.  Respiratory: No cough, sputum. No shortness of breath.   Cardiovascular: No chest pain.   Gastrointestinal: No nausea or vomiting. No abdominal pain or diarrhea.   Genitourinary: No burning urine, no frequency. No flank pain  Musculoskeletal: No joint swelling or pain.   Neurological: No confusion. 	  Skin: No rash    PAST MEDICAL & SURGICAL HISTORY:  CVA (cerebral vascular accident)  COPD (chronic obstructive pulmonary disease)  BPH (benign prostatic hyperplasia)  Bipolar affective disorder  CKD (chronic kidney disease)  Pancreatitis  Hypertension  Diabetes mellitus  Coronary artery disease  HLD (hyperlipidemia)  Anemia  Fainting  Cardiac arrhythmia  Dizziness  Hydronephrosis  Cholecystitis  Bipolar 1 disorder  Lumbar radiculopathy  Left heart failure  Reflux esophagitis  History of cardiac catheterization  No significant past surgical history    FAMILY HISTORY:  No pertinent family history in first degree relatives    SOCIAL HISTORY:  non smoker      ANTIMICROBIALS:      OTHER MEDS:    acetaminophen   Tablet 650 milliGRAM(s) Oral every 6 hours PRN  amiodarone    Tablet 200 milliGRAM(s) Oral daily  artificial tears (preservative free) Ophthalmic Solution 2 Drop(s) Both EYES four times a day  aspirin  chewable 81 milliGRAM(s) Oral daily  atorvastatin 40 milliGRAM(s) Oral at bedtime  buDESOnide  80 MICROgram(s)/formoterol 4.5 MICROgram(s) Inhaler 2 Puff(s) Inhalation two times a day  busPIRone 5 milliGRAM(s) Oral two times a day  clopidogrel Tablet 75 milliGRAM(s) Oral daily  doxazosin 2 milliGRAM(s) Oral at bedtime  epoetin georgie Injectable 05468 Unit(s) IV Push <User Schedule>  glucagon  Injectable 1 milliGRAM(s) IntraMuscular once PRN  influenza   Vaccine 0.5 milliLiter(s) IntraMuscular once  insulin lispro (HumaLOG) corrective regimen sliding scale   SubCutaneous every 6 hours  metoprolol     tartrate 25 milliGRAM(s) Oral two times a day  Nephro-sophie 1 Tablet(s) Oral daily  pantoprazole    Tablet 40 milliGRAM(s) Oral before breakfast  sevelamer hydrochloride 1600 milliGRAM(s) Oral three times a day with meals    Allergies  No Known Allergies      Vital Signs Last 24 Hrs  T(C): 36.6 (2018 05:25), Max: 38.2 (10 Feb 2018 15:01)  T(F): 97.9 (2018 05:25), Max: 100.7 (10 Feb 2018 15:01)  HR: 80 (2018 05:25) (71 - 84)  BP: 114/79 (2018 05:25) (108/57 - 125/62)  RR: 18 (2018 05:25) (16 - 18)  SpO2: 97% (2018 05:25) (97% - 100%)  CAPILLARY BLOOD GLUCOSE  170 (10 Feb 2018 17:07)  POCT Blood Glucose.: 112 mg/dL (2018 06:27)   Daily Weight in k.9 (10 Feb 2018 11:32)        PHYSICAL EXAM:  patient in no acute respiratory distress.  Constitutional: Comfortable. Awake and alert  Eyes: PERRL EOMI. No discharge.  ENMT: No sinus tenderness.  No pharyngeal erythema.   Neck: Supple. No thyromegaly   Respiratory: Good air entry bilaterally, no wheezes, rhonchi, or crackles  Cardiovascular:S1 S2 wnl, No murmurs  Gastrointestinal: Soft, no tenderness , bowel sounds present,   Genitourinary: No suprapubic tenderness. no CVA tenderness   Musculoskeletal: No joint swelling. No edema.  Vascular: peripheral pulses felt  Neurological: No grossly focal deficits  Skin: No rash   Lymph Nodes: No palpable Lymphadenopathy   Psychiatric: Affect normal  Lines:                           8.5    7.66  )-----------( 260      ( 10 Feb 2018 03:30 )             29.1     WBC Count: 7.66 (02-10 @ 03:30)  WBC Count: 5.03 ( @ 06:30)  WBC Count: 6.36 ( @ 06:30)  WBC Count: 6.14 ( @ 23:30)    02-10    137  |  96<L>  |  29<H>  ----------------------------<  107<H>  3.7   |  27  |  3.63<H>    Ca    8.3<L>      10 Feb 2018 03:30      PT/INR - ( 2018 06:30 )   PT: 36.0 SEC;   INR: 3.17                  RVP:   @ 15:30  229E Coronavirus: --           Adenovirus: NOT DETECTED  Bordetella Pertussis NOT DETECTED  Chlamydia Pneumoniae NOT DETECTED  Entero/Rhinovirus NOT DETECTED  HKU1 Coronavirus --           hMPV NOT DETECTED  Influenza A NOT DETECTED (any subtype)  Influenza AH1 NOT DETECTED  Influenza AH1 2009 NOT DETECTED  Influenza AH3 NOT DETECTED  Influenza B NOT DETECTED  Mycoplasma pneumoniae NOT DETECTED This nucleic acid amplification assay was performed using  the [employer]. The following pathogens are tested  for: Adenovirus, Coronavirus 229E, Coronavirus HKU1,  Coronavirus NL63, Coronavirus OC43, Human Metapneumovirus  (HMPV), Rhinovirus/Enterovirus, Influenza A H1, Influenza A  H1 2009 (Pandemic H1 2009), Influenza A H3, Influenza A (Flu  A) subtype not identified, Influenza B, Parainfluenza Virus  (types 1, 2, 3, 4), Respiratory Syncytial Virus (RSV),  Bordetella pertussis, Chlamydophila pneumoniae, and  Mycoplasma pneumoniae. A negative FilmArray result does not  always exclude the possibility of viral or bacterial  infection. Laboratory results should always be interpreted  in the context of clinical findings.  NL63 Coronavirus --           OC43 Coronavirus --           Parainfluenza 1 NOT DETECTED  Parainfluenza 2 NOT DETECTED  Parainfluenza 3 NOT DETECTED  Parainfluenza 4 NOT DETECTED  Resp Syncytial Virus NOT DETECTED          MICROBIOLOGY:    Culture - Blood (collected 10 Feb 2018 03:56)  Source: BLOOD PERIPHERAL  Preliminary Report (2018 03:55):    NO ORGANISMS ISOLATED    NO ORGANISMS ISOLATED AT 24 HOURS          RADIOLOGY:    < from: Xray Chest 1 View- PORTABLE-Urgent (02.10.18 @ 03:47) >    Hemodialysis catheter is seen with its tip at the SVC/right atrial   junction. No focal consolidations, effusions or pneumothorax. Mild   cardiomegaly with a coronary stent seen.      COMPARISON:  2018      IMPRESSION:  Clear lungs with hemodialysis catheter that appears to be in   satisfactory position. Infectious Diseases Consult note  Patient is a 72y old  Male who presents with a chief complaint of Removed permacath (2018 13:43)    DAVIDSON BUCHANAN-[id number]      HPI:  72 male with DM, ESRD on HD, HTN, CVA, COPD, A fib on coumadin, dementia sent from [place] after pt partially removed his permacath. Permacath exchanged on 18. He started having fevers after the procedure. t max 101.2.RVP is negative. CXR shows clear lungs. Blood cultures show no growth to date. Patient has been febrile with no leukocytosis. Normal Liver function tests. He received no antibiotics this admission. In oct 2017 his blood cultures grew  coagulase negative staphylococcus and bacillus species- treated at the time with vancomycin for 14 days and HD catheter removal. No sick contacts. No recent travel.      REVIEW OF SYSTEMS  All as below unless noted otherwise    General:  Denies fever and chills. 	  Ophthalmologic: Denies any visual complaints. 	  ENMT: No throat pain.  Respiratory: No cough, sputum. No shortness of breath.   Cardiovascular: No chest pain.   Gastrointestinal: No nausea or vomiting. No abdominal pain or diarrhea.   Genitourinary: No burning urine, no frequency. No flank pain  Musculoskeletal: No joint swelling or pain.   Neurological: No confusion. 	  Skin: No rash    PAST MEDICAL & SURGICAL HISTORY:  CVA (cerebral vascular accident)  COPD (chronic obstructive pulmonary disease)  BPH (benign prostatic hyperplasia)  Bipolar affective disorder  CKD (chronic kidney disease)  Pancreatitis  Hypertension  Diabetes mellitus  Coronary artery disease  HLD (hyperlipidemia)  Anemia  Fainting  Cardiac arrhythmia  Dizziness  Hydronephrosis  Cholecystitis  Bipolar 1 disorder  Lumbar radiculopathy  Left heart failure  Reflux esophagitis  History of cardiac catheterization  No significant past surgical history    FAMILY HISTORY:  No pertinent family history in first degree relatives    SOCIAL HISTORY:  non smoker      ANTIMICROBIALS:      OTHER MEDS:    acetaminophen   Tablet 650 milliGRAM(s) Oral every 6 hours PRN  amiodarone    Tablet 200 milliGRAM(s) Oral daily  artificial tears (preservative free) Ophthalmic Solution 2 Drop(s) Both EYES four times a day  aspirin  chewable 81 milliGRAM(s) Oral daily  atorvastatin 40 milliGRAM(s) Oral at bedtime  buDESOnide  80 MICROgram(s)/formoterol 4.5 MICROgram(s) Inhaler 2 Puff(s) Inhalation two times a day  busPIRone 5 milliGRAM(s) Oral two times a day  clopidogrel Tablet 75 milliGRAM(s) Oral daily  doxazosin 2 milliGRAM(s) Oral at bedtime  epoetin georgie Injectable 89684 Unit(s) IV Push <User Schedule>  glucagon  Injectable 1 milliGRAM(s) IntraMuscular once PRN  influenza   Vaccine 0.5 milliLiter(s) IntraMuscular once  insulin lispro (HumaLOG) corrective regimen sliding scale   SubCutaneous every 6 hours  metoprolol     tartrate 25 milliGRAM(s) Oral two times a day  Nephro-sophie 1 Tablet(s) Oral daily  pantoprazole    Tablet 40 milliGRAM(s) Oral before breakfast  sevelamer hydrochloride 1600 milliGRAM(s) Oral three times a day with meals    Allergies  No Known Allergies      Vital Signs Last 24 Hrs  T(C): 36.6 (2018 05:25), Max: 38.2 (10 Feb 2018 15:01)  T(F): 97.9 (2018 05:25), Max: 100.7 (10 Feb 2018 15:01)  HR: 80 (2018 05:25) (71 - 84)  BP: 114/79 (2018 05:25) (108/57 - 125/62)  RR: 18 (2018 05:25) (16 - 18)  SpO2: 97% (2018 05:25) (97% - 100%)  CAPILLARY BLOOD GLUCOSE  170 (10 Feb 2018 17:07)  POCT Blood Glucose.: 112 mg/dL (2018 06:27)   Daily Weight in k.9 (10 Feb 2018 11:32)        PHYSICAL EXAM:  patient in no acute respiratory distress.  Constitutional: Comfortable. Awake and alert  Eyes: PERRL EOMI. No discharge.  ENMT: No sinus tenderness.  No pharyngeal erythema.   Neck: Supple. No thyromegaly   Respiratory: Good air entry bilaterally, no wheezes, rhonchi, or crackles  Cardiovascular:S1 S2 wnl, No murmurs  Gastrointestinal: Soft, no tenderness , bowel sounds present,   Genitourinary: No suprapubic tenderness. no CVA tenderness   Musculoskeletal: No joint swelling. No edema.  Vascular: peripheral pulses felt  Neurological: No grossly focal deficits  Skin: No rash   Lymph Nodes: No palpable Lymphadenopathy   Psychiatric: Affect normal  Lines:                           8.5    7.66  )-----------( 260      ( 10 Feb 2018 03:30 )             29.1     WBC Count: 7.66 (02-10 @ 03:30)  WBC Count: 5.03 ( @ 06:30)  WBC Count: 6.36 ( @ 06:30)  WBC Count: 6.14 ( @ 23:30)    02-10    137  |  96<L>  |  29<H>  ----------------------------<  107<H>  3.7   |  27  |  3.63<H>    Ca    8.3<L>      10 Feb 2018 03:30      PT/INR - ( 2018 06:30 )   PT: 36.0 SEC;   INR: 3.17                  RVP:   @ 15:30  229E Coronavirus: --           Adenovirus: NOT DETECTED  Bordetella Pertussis NOT DETECTED  Chlamydia Pneumoniae NOT DETECTED  Entero/Rhinovirus NOT DETECTED  HKU1 Coronavirus --           hMPV NOT DETECTED  Influenza A NOT DETECTED (any subtype)  Influenza AH1 NOT DETECTED  Influenza AH1 2009 NOT DETECTED  Influenza AH3 NOT DETECTED  Influenza B NOT DETECTED  Mycoplasma pneumoniae NOT DETECTED This nucleic acid amplification assay was performed using  the [employer]. The following pathogens are tested  for: Adenovirus, Coronavirus 229E, Coronavirus HKU1,  Coronavirus NL63, Coronavirus OC43, Human Metapneumovirus  (HMPV), Rhinovirus/Enterovirus, Influenza A H1, Influenza A  H1 2009 (Pandemic H1 2009), Influenza A H3, Influenza A (Flu  A) subtype not identified, Influenza B, Parainfluenza Virus  (types 1, 2, 3, 4), Respiratory Syncytial Virus (RSV),  Bordetella pertussis, Chlamydophila pneumoniae, and  Mycoplasma pneumoniae. A negative FilmArray result does not  always exclude the possibility of viral or bacterial  infection. Laboratory results should always be interpreted  in the context of clinical findings.  NL63 Coronavirus --           OC43 Coronavirus --           Parainfluenza 1 NOT DETECTED  Parainfluenza 2 NOT DETECTED  Parainfluenza 3 NOT DETECTED  Parainfluenza 4 NOT DETECTED  Resp Syncytial Virus NOT DETECTED          MICROBIOLOGY:    Culture - Blood (collected 10 Feb 2018 03:56)  Source: BLOOD PERIPHERAL  Preliminary Report (2018 03:55):    NO ORGANISMS ISOLATED    NO ORGANISMS ISOLATED AT 24 HOURS          RADIOLOGY:    < from: Xray Chest 1 View- PORTABLE-Urgent (02.10.18 @ 03:47) >    Hemodialysis catheter is seen with its tip at the SVC/right atrial   junction. No focal consolidations, effusions or pneumothorax. Mild   cardiomegaly with a coronary stent seen.      COMPARISON:  2018      IMPRESSION:  Clear lungs with hemodialysis catheter that appears to be in   satisfactory position. Infectious Diseases Consult note  Patient is a 72y old  Male who presents with a chief complaint of Removed permacath (2018 13:43)    DAVIDSON BUCHANAN-[id number]      HPI:  72 male with DM, ESRD on HD, HTN, CVA, COPD, A fib on coumadin, dementia sent from [place] after pt partially removed his permacath. Permacath exchanged on 18. He started having fevers after the procedure. t max 101.2.RVP is negative. CXR shows clear lungs. Blood cultures show no growth to date. Patient has been febrile with no leukocytosis. Normal Liver function tests. He received no antibiotics this admission. In oct 2017 his blood cultures grew  coagulase negative staphylococcus and bacillus species- treated at the time with vancomycin for 14 days and HD catheter removal. No sick contacts. No recent travel.  patient is not say a word during interview. He did not give review of systems.     REVIEW OF SYSTEMS  patient is not say a word during interview. He did not give review of systems.     PAST MEDICAL & SURGICAL HISTORY:  CVA (cerebral vascular accident)  COPD (chronic obstructive pulmonary disease)  BPH (benign prostatic hyperplasia)  Bipolar affective disorder  CKD (chronic kidney disease)  Pancreatitis  Hypertension  Diabetes mellitus  Coronary artery disease  HLD (hyperlipidemia)  Anemia  Fainting  Cardiac arrhythmia  Dizziness  Hydronephrosis  Cholecystitis  Bipolar 1 disorder  Lumbar radiculopathy  Left heart failure  Reflux esophagitis  History of cardiac catheterization  No significant past surgical history    FAMILY HISTORY:  No pertinent family history in first degree relatives    SOCIAL HISTORY:  non smoker      ANTIMICROBIALS:      OTHER MEDS:    acetaminophen   Tablet 650 milliGRAM(s) Oral every 6 hours PRN  amiodarone    Tablet 200 milliGRAM(s) Oral daily  artificial tears (preservative free) Ophthalmic Solution 2 Drop(s) Both EYES four times a day  aspirin  chewable 81 milliGRAM(s) Oral daily  atorvastatin 40 milliGRAM(s) Oral at bedtime  buDESOnide  80 MICROgram(s)/formoterol 4.5 MICROgram(s) Inhaler 2 Puff(s) Inhalation two times a day  busPIRone 5 milliGRAM(s) Oral two times a day  clopidogrel Tablet 75 milliGRAM(s) Oral daily  doxazosin 2 milliGRAM(s) Oral at bedtime  epoetin georgie Injectable 22733 Unit(s) IV Push <User Schedule>  glucagon  Injectable 1 milliGRAM(s) IntraMuscular once PRN  influenza   Vaccine 0.5 milliLiter(s) IntraMuscular once  insulin lispro (HumaLOG) corrective regimen sliding scale   SubCutaneous every 6 hours  metoprolol     tartrate 25 milliGRAM(s) Oral two times a day  Nephro-sophie 1 Tablet(s) Oral daily  pantoprazole    Tablet 40 milliGRAM(s) Oral before breakfast  sevelamer hydrochloride 1600 milliGRAM(s) Oral three times a day with meals    Allergies  No Known Allergies      Vital Signs Last 24 Hrs  T(C): 36.6 (2018 05:25), Max: 38.2 (10 Feb 2018 15:01)  T(F): 97.9 (2018 05:25), Max: 100.7 (10 Feb 2018 15:01)  HR: 80 (2018 05:25) (71 - 84)  BP: 114/79 (2018 05:25) (108/57 - 125/62)  RR: 18 (2018 05:25) (16 - 18)  SpO2: 97% (2018 05:25) (97% - 100%)  CAPILLARY BLOOD GLUCOSE  170 (10 Feb 2018 17:07)  POCT Blood Glucose.: 112 mg/dL (2018 06:27)   Daily Weight in k.9 (10 Feb 2018 11:32)        PHYSICAL EXAM:  patient in no acute respiratory distress.  Constitutional: Comfortable. Awake  Eyes: PERRL EOMI. No discharge.  ENMT: No sinus tenderness.  No pharyngeal erythema.   Neck: Supple. No thyromegaly   Respiratory:  air entry bilaterally, no wheezes, rhonchi, or crackles  right chest HD catheter - permacath  Cardiovascular:S1 S2 wnl, No murmurs  Gastrointestinal: Soft, no tenderness , bowel sounds present,   Genitourinary: No suprapubic tenderness. no CVA tenderness   Musculoskeletal: No joint swelling. No edema.  Vascular: peripheral pulses felt  Neurological: did not follow commands   Skin: No rash   Lymph Nodes: No palpable Lymphadenopathy   Psychiatric: patient is not say a word during interview  Lines:                           8.5    7.66  )-----------( 260      ( 10 Feb 2018 03:30 )             29.1     WBC Count: 7.66 (02-10 @ 03:30)  WBC Count: 5.03 ( @ 06:30)  WBC Count: 6.36 ( @ 06:30)  WBC Count: 6.14 ( @ 23:30)    02-10    137  |  96<L>  |  29<H>  ----------------------------<  107<H>  3.7   |  27  |  3.63<H>    Ca    8.3<L>      10 Feb 2018 03:30      PT/INR - ( 2018 06:30 )   PT: 36.0 SEC;   INR: 3.17                  RVP:   @ 15:30  229E Coronavirus: --           Adenovirus: NOT DETECTED  Bordetella Pertussis NOT DETECTED  Chlamydia Pneumoniae NOT DETECTED  Entero/Rhinovirus NOT DETECTED  HKU1 Coronavirus --           hMPV NOT DETECTED  Influenza A NOT DETECTED (any subtype)  Influenza AH1 NOT DETECTED  Influenza AH1 2009 NOT DETECTED  Influenza AH3 NOT DETECTED  Influenza B NOT DETECTED  Mycoplasma pneumoniae NOT DETECTED This nucleic acid amplification assay was performed using  the [employer]. The following pathogens are tested  for: Adenovirus, Coronavirus 229E, Coronavirus HKU1,  Coronavirus NL63, Coronavirus OC43, Human Metapneumovirus  (HMPV), Rhinovirus/Enterovirus, Influenza A H1, Influenza A  H1 2009 (Pandemic H1 2009), Influenza A H3, Influenza A (Flu  A) subtype not identified, Influenza B, Parainfluenza Virus  (types 1, 2, 3, 4), Respiratory Syncytial Virus (RSV),  Bordetella pertussis, Chlamydophila pneumoniae, and  Mycoplasma pneumoniae. A negative [employer]Array result does not  always exclude the possibility of viral or bacterial  infection. Laboratory results should always be interpreted  in the context of clinical findings.  NL63 Coronavirus --           OC43 Coronavirus --           Parainfluenza 1 NOT DETECTED  Parainfluenza 2 NOT DETECTED  Parainfluenza 3 NOT DETECTED  Parainfluenza 4 NOT DETECTED  Resp Syncytial Virus NOT DETECTED          MICROBIOLOGY:    Culture - Blood (collected 10 Feb 2018 03:56)  Source: BLOOD PERIPHERAL  Preliminary Report (2018 03:55):    NO ORGANISMS ISOLATED    NO ORGANISMS ISOLATED AT 24 HOURS          RADIOLOGY:    < from: Xray Chest 1 View- PORTABLE-Urgent (02.10.18 @ 03:47) >    Hemodialysis catheter is seen with its tip at the SVC/right atrial   junction. No focal consolidations, effusions or pneumothorax. Mild   cardiomegaly with a coronary stent seen.      COMPARISON:  2018      IMPRESSION:  Clear lungs with hemodialysis catheter that appears to be in   satisfactory position.

## 2018-02-11 NOTE — PROGRESS NOTE ADULT - SUBJECTIVE AND OBJECTIVE BOX
Febrile overnight. Now + influenza B                          8.5    7.66  )-----------( 260      ( 10 Feb 2018 03:30 )             29.1                           8.5    7.66  )-----------( 260      ( 10 Feb 2018 03:30 )             29.1     02-10    137  |  96<L>  |  29<H>  ----------------------------<  107<H>  3.7   |  27  |  3.63<H>    Ca    8.3<L>      10 Feb 2018 03:30        I&O's Detail    10 Feb 2018 07:01  -  11 Feb 2018 07:00  --------------------------------------------------------  IN:    Other: 500 mL  Total IN: 500 mL    OUT:    Other: 1600 mL  Total OUT: 1600 mL    Total NET: -1100 mL          I&O's Summary    10 Feb 2018 07:01  -  11 Feb 2018 07:00  --------------------------------------------------------  IN: 500 mL / OUT: 1600 mL / NET: -1100 mL        Daily     Daily     Vital Signs Last 24 Hrs  T(C): 37.2 (11 Feb 2018 13:22), Max: 38.2 (10 Feb 2018 18:06)  T(F): 99 (11 Feb 2018 13:22), Max: 100.7 (10 Feb 2018 18:06)  HR: 81 (11 Feb 2018 13:22) (71 - 81)  BP: 103/61 (11 Feb 2018 13:22) (103/61 - 119/60)  BP(mean): --  RR: 18 (11 Feb 2018 13:22) (16 - 18)  SpO2: 96% (11 Feb 2018 13:22) (96% - 100%)      MEDICATIONS  (STANDING):  amiodarone    Tablet 200 milliGRAM(s) Oral daily  artificial tears (preservative free) Ophthalmic Solution 2 Drop(s) Both EYES four times a day  aspirin  chewable 81 milliGRAM(s) Oral daily  atorvastatin 40 milliGRAM(s) Oral at bedtime  buDESOnide  80 MICROgram(s)/formoterol 4.5 MICROgram(s) Inhaler 2 Puff(s) Inhalation two times a day  busPIRone 5 milliGRAM(s) Oral two times a day  clopidogrel Tablet 75 milliGRAM(s) Oral daily  dextrose 5%. 1000 milliLiter(s) (50 mL/Hr) IV Continuous <Continuous>  dextrose 50% Injectable 12.5 Gram(s) IV Push once  dextrose 50% Injectable 25 Gram(s) IV Push once  dextrose 50% Injectable 25 Gram(s) IV Push once  doxazosin 2 milliGRAM(s) Oral at bedtime  epoetin georgie Injectable 31256 Unit(s) IV Push <User Schedule>  influenza   Vaccine 0.5 milliLiter(s) IntraMuscular once  insulin lispro (HumaLOG) corrective regimen sliding scale   SubCutaneous every 6 hours  metoprolol     tartrate 25 milliGRAM(s) Oral two times a day  Nephro-sophie 1 Tablet(s) Oral daily  oseltamivir 30 milliGRAM(s) Oral once  pantoprazole    Tablet 40 milliGRAM(s) Oral before breakfast  sevelamer hydrochloride 1600 milliGRAM(s) Oral three times a day with meals  warfarin 1.5 milliGRAM(s) Oral once    MEDICATIONS  (PRN):  acetaminophen   Tablet 650 milliGRAM(s) Oral every 6 hours PRN For Temp greater than 38 C (100.4 F)  dextrose Gel 1 Dose(s) Oral once PRN Blood Glucose LESS THAN 70 milliGRAM(s)/deciliter  glucagon  Injectable 1 milliGRAM(s) IntraMuscular once PRN Glucose LESS THAN 70 milligrams/deciliter

## 2018-02-12 DIAGNOSIS — J10.1 INFLUENZA DUE TO OTHER IDENTIFIED INFLUENZA VIRUS WITH OTHER RESPIRATORY MANIFESTATIONS: ICD-10-CM

## 2018-02-12 LAB
BASOPHILS # BLD AUTO: 0.01 K/UL — SIGNIFICANT CHANGE UP (ref 0–0.2)
BASOPHILS NFR BLD AUTO: 0.1 % — SIGNIFICANT CHANGE UP (ref 0–2)
BUN SERPL-MCNC: 41 MG/DL — HIGH (ref 7–23)
BUN SERPL-MCNC: 41 MG/DL — HIGH (ref 7–23)
CALCIUM SERPL-MCNC: 8.3 MG/DL — LOW (ref 8.4–10.5)
CALCIUM SERPL-MCNC: 8.3 MG/DL — LOW (ref 8.4–10.5)
CHLORIDE SERPL-SCNC: 100 MMOL/L — SIGNIFICANT CHANGE UP (ref 98–107)
CHLORIDE SERPL-SCNC: 100 MMOL/L — SIGNIFICANT CHANGE UP (ref 98–107)
CO2 SERPL-SCNC: 24 MMOL/L — SIGNIFICANT CHANGE UP (ref 22–31)
CO2 SERPL-SCNC: 24 MMOL/L — SIGNIFICANT CHANGE UP (ref 22–31)
CREAT SERPL-MCNC: 4.25 MG/DL — HIGH (ref 0.5–1.3)
CREAT SERPL-MCNC: 4.25 MG/DL — HIGH (ref 0.5–1.3)
EOSINOPHIL # BLD AUTO: 0.05 K/UL — SIGNIFICANT CHANGE UP (ref 0–0.5)
EOSINOPHIL NFR BLD AUTO: 0.7 % — SIGNIFICANT CHANGE UP (ref 0–6)
GLUCOSE BLDC GLUCOMTR-MCNC: 101 MG/DL — HIGH (ref 70–99)
GLUCOSE BLDC GLUCOMTR-MCNC: 97 MG/DL — SIGNIFICANT CHANGE UP (ref 70–99)
GLUCOSE SERPL-MCNC: 92 MG/DL — SIGNIFICANT CHANGE UP (ref 70–99)
GLUCOSE SERPL-MCNC: 92 MG/DL — SIGNIFICANT CHANGE UP (ref 70–99)
HCT VFR BLD CALC: 28.8 % — LOW (ref 39–50)
HCT VFR BLD CALC: 28.8 % — LOW (ref 39–50)
HGB BLD-MCNC: 8.4 G/DL — LOW (ref 13–17)
HGB BLD-MCNC: 8.4 G/DL — LOW (ref 13–17)
IMM GRANULOCYTES # BLD AUTO: 0.01 # — SIGNIFICANT CHANGE UP
IMM GRANULOCYTES NFR BLD AUTO: 0.1 % — SIGNIFICANT CHANGE UP (ref 0–1.5)
INR BLD: 2.72 — HIGH (ref 0.88–1.17)
LYMPHOCYTES # BLD AUTO: 2.31 K/UL — SIGNIFICANT CHANGE UP (ref 1–3.3)
LYMPHOCYTES # BLD AUTO: 34.3 % — SIGNIFICANT CHANGE UP (ref 13–44)
MAGNESIUM SERPL-MCNC: 2 MG/DL — SIGNIFICANT CHANGE UP (ref 1.6–2.6)
MCHC RBC-ENTMCNC: 27.5 PG — SIGNIFICANT CHANGE UP (ref 27–34)
MCHC RBC-ENTMCNC: 27.5 PG — SIGNIFICANT CHANGE UP (ref 27–34)
MCHC RBC-ENTMCNC: 29.2 % — LOW (ref 32–36)
MCHC RBC-ENTMCNC: 29.2 % — LOW (ref 32–36)
MCV RBC AUTO: 94.4 FL — SIGNIFICANT CHANGE UP (ref 80–100)
MCV RBC AUTO: 94.4 FL — SIGNIFICANT CHANGE UP (ref 80–100)
MONOCYTES # BLD AUTO: 0.48 K/UL — SIGNIFICANT CHANGE UP (ref 0–0.9)
MONOCYTES NFR BLD AUTO: 7.1 % — SIGNIFICANT CHANGE UP (ref 2–14)
NEUTROPHILS # BLD AUTO: 3.87 K/UL — SIGNIFICANT CHANGE UP (ref 1.8–7.4)
NEUTROPHILS NFR BLD AUTO: 57.7 % — SIGNIFICANT CHANGE UP (ref 43–77)
NRBC # FLD: 0.02 — SIGNIFICANT CHANGE UP
NRBC # FLD: 0.02 — SIGNIFICANT CHANGE UP
PHOSPHATE SERPL-MCNC: 3.4 MG/DL — SIGNIFICANT CHANGE UP (ref 2.5–4.5)
PLATELET # BLD AUTO: 256 K/UL — SIGNIFICANT CHANGE UP (ref 150–400)
PLATELET # BLD AUTO: 256 K/UL — SIGNIFICANT CHANGE UP (ref 150–400)
PMV BLD: 9.4 FL — SIGNIFICANT CHANGE UP (ref 7–13)
PMV BLD: 9.4 FL — SIGNIFICANT CHANGE UP (ref 7–13)
POTASSIUM SERPL-MCNC: 3.7 MMOL/L — SIGNIFICANT CHANGE UP (ref 3.5–5.3)
POTASSIUM SERPL-MCNC: 3.7 MMOL/L — SIGNIFICANT CHANGE UP (ref 3.5–5.3)
POTASSIUM SERPL-SCNC: 3.7 MMOL/L — SIGNIFICANT CHANGE UP (ref 3.5–5.3)
POTASSIUM SERPL-SCNC: 3.7 MMOL/L — SIGNIFICANT CHANGE UP (ref 3.5–5.3)
PROTHROM AB SERPL-ACNC: 30.8 SEC — HIGH (ref 9.8–13.1)
RBC # BLD: 3.05 M/UL — LOW (ref 4.2–5.8)
RBC # BLD: 3.05 M/UL — LOW (ref 4.2–5.8)
RBC # FLD: 15.1 % — HIGH (ref 10.3–14.5)
RBC # FLD: 15.1 % — HIGH (ref 10.3–14.5)
SODIUM SERPL-SCNC: 140 MMOL/L — SIGNIFICANT CHANGE UP (ref 135–145)
SODIUM SERPL-SCNC: 140 MMOL/L — SIGNIFICANT CHANGE UP (ref 135–145)
WBC # BLD: 6.73 K/UL — SIGNIFICANT CHANGE UP (ref 3.8–10.5)
WBC # BLD: 6.73 K/UL — SIGNIFICANT CHANGE UP (ref 3.8–10.5)
WBC # FLD AUTO: 6.73 K/UL — SIGNIFICANT CHANGE UP (ref 3.8–10.5)
WBC # FLD AUTO: 6.73 K/UL — SIGNIFICANT CHANGE UP (ref 3.8–10.5)

## 2018-02-12 PROCEDURE — 99233 SBSQ HOSP IP/OBS HIGH 50: CPT

## 2018-02-12 PROCEDURE — 99232 SBSQ HOSP IP/OBS MODERATE 35: CPT

## 2018-02-12 RX ORDER — WARFARIN SODIUM 2.5 MG/1
3.5 TABLET ORAL ONCE
Qty: 0 | Refills: 0 | Status: COMPLETED | OUTPATIENT
Start: 2018-02-12 | End: 2018-02-12

## 2018-02-12 RX ORDER — ACETAMINOPHEN 500 MG
2 TABLET ORAL
Qty: 0 | Refills: 0 | COMMUNITY
Start: 2018-02-12

## 2018-02-12 RX ADMIN — Medication 25 MILLIGRAM(S): at 21:21

## 2018-02-12 RX ADMIN — AMIODARONE HYDROCHLORIDE 200 MILLIGRAM(S): 400 TABLET ORAL at 11:53

## 2018-02-12 RX ADMIN — Medication 25 MILLIGRAM(S): at 11:53

## 2018-02-12 RX ADMIN — PANTOPRAZOLE SODIUM 40 MILLIGRAM(S): 20 TABLET, DELAYED RELEASE ORAL at 06:26

## 2018-02-12 RX ADMIN — SEVELAMER CARBONATE 1600 MILLIGRAM(S): 2400 POWDER, FOR SUSPENSION ORAL at 11:54

## 2018-02-12 RX ADMIN — Medication 2 DROP(S): at 06:26

## 2018-02-12 RX ADMIN — Medication 5 MILLIGRAM(S): at 21:21

## 2018-02-12 RX ADMIN — BUDESONIDE AND FORMOTEROL FUMARATE DIHYDRATE 2 PUFF(S): 160; 4.5 AEROSOL RESPIRATORY (INHALATION) at 21:21

## 2018-02-12 RX ADMIN — BUDESONIDE AND FORMOTEROL FUMARATE DIHYDRATE 2 PUFF(S): 160; 4.5 AEROSOL RESPIRATORY (INHALATION) at 11:53

## 2018-02-12 RX ADMIN — ATORVASTATIN CALCIUM 40 MILLIGRAM(S): 80 TABLET, FILM COATED ORAL at 21:21

## 2018-02-12 RX ADMIN — Medication 5 MILLIGRAM(S): at 06:26

## 2018-02-12 RX ADMIN — Medication 30 MILLIGRAM(S): at 21:21

## 2018-02-12 RX ADMIN — Medication 81 MILLIGRAM(S): at 11:54

## 2018-02-12 RX ADMIN — Medication 2 MILLIGRAM(S): at 21:21

## 2018-02-12 RX ADMIN — Medication 1 TABLET(S): at 11:54

## 2018-02-12 RX ADMIN — WARFARIN SODIUM 3.5 MILLIGRAM(S): 2.5 TABLET ORAL at 22:27

## 2018-02-12 RX ADMIN — SEVELAMER CARBONATE 1600 MILLIGRAM(S): 2400 POWDER, FOR SUSPENSION ORAL at 08:34

## 2018-02-12 RX ADMIN — ERYTHROPOIETIN 10000 UNIT(S): 10000 INJECTION, SOLUTION INTRAVENOUS; SUBCUTANEOUS at 18:40

## 2018-02-12 RX ADMIN — Medication 1: at 16:58

## 2018-02-12 RX ADMIN — CLOPIDOGREL BISULFATE 75 MILLIGRAM(S): 75 TABLET, FILM COATED ORAL at 11:53

## 2018-02-12 RX ADMIN — Medication 2 DROP(S): at 11:53

## 2018-02-12 NOTE — PROGRESS NOTE ADULT - SUBJECTIVE AND OBJECTIVE BOX
No pain, no shortness of breath      VITAL:  T(C): , Max: 37.4 (02-11-18 @ 19:42)  T(F): , Max: 99.3 (02-11-18 @ 19:42)  HR: 73 (02-12-18 @ 06:13)  BP: 126/61 (02-12-18 @ 06:13)  BP(mean): --  RR: 17 (02-12-18 @ 06:13)  SpO2: 98% (02-12-18 @ 06:13)      PHYSICAL EXAM:  Constitutional: NAD, cachectic  HEENT: NCAT, DMM  Neck:  No JVD  Respiratory: CTAB/L  Cardiovascular: S1 and S2  Gastrointestinal: BS+, soft, NT/ND  Extremities: No peripheral edema, contracted  Neurological: tone WNL  : No Landa  Skin: No rash  Access: (+)[facility] permcat      LABS:                        8.4    6.73  )-----------( 256      ( 12 Feb 2018 04:50 )             28.8     Na(140)/K(3.7)/Cl(100)/HCO3(24)/BUN(41)/Cr(4.25)Glu(92)/Ca(8.3)/Mg(2.0)/PO4(3.4)    02-12 @ 04:50  Na(137)/K(3.7)/Cl(96)/HCO3(27)/BUN(29)/Cr(3.63)Glu(107)/Ca(8.3)/Mg(--)/PO4(--)    02-10 @ 03:30      IMPRESSION: 72M w/ HTN, DM, CVA, ESRD on HD, COPD, A fib on coumadin, and dementia, 2/7/18 from Que w/ displaced tunneled dialysis catheter and c/b influenza    (1)Renal - ESRD-HD usually MWF - received HD TTS last week - can transition to MWF as of today.  (2)Vascular - s/p successful permacath change. Immature AVF.  (3)Anemia - on Epogen with HD  (4)Influenza - on Tamiflu - dosed appropriately for ESRD-HD      RECOMMEND:  (1)HD today as ordered; Epogen with HD  (2)Tamiflu as ordered  (3)AVF maturation as outpatient.              Wong Pablo MD  [facility]  [phone]

## 2018-02-12 NOTE — PROGRESS NOTE ADULT - ASSESSMENT
72M dementia, CAD, s/p 12-14 stents placed in [id number], admitted for 3 months end of 2017 at [facility] (s/p CAC, MI, CVA, infections), chronic CHFrEF, DM, HTN, AF (coumadin), CVAs, ESRD, radiculopathy with herniated disc, COPD, kidney stone with h/o urethral stents in the past, BPH, rehabing at [place], sent in after pt partially removed his permacath. Permacath replaced without problem.  p/w Influenza B infection.

## 2018-02-12 NOTE — PROGRESS NOTE ADULT - ATTENDING COMMENTS
Sean Gallego  Attending Physician   Division of Infectious Disease  Pager #[phone]  After 5pm/weekend or no response, call #[phone]    Will sign off, recall ID if needed #[phone].

## 2018-02-12 NOTE — PROGRESS NOTE ADULT - ASSESSMENT
72 male with DM, ESRD on HD, HTN, CVA, COPD, A fib on coumadin, dementia here with fevers after Permacath exchange.   In oct 2017 his blood cultures grew coagulase negative staphylococcus and bacillus species- treated at the time with vancomycin for 14 days and HD catheter removal.

## 2018-02-12 NOTE — PROGRESS NOTE ADULT - PROBLEM SELECTOR PLAN 1
Influenza B detected. WBC in nml range. Awaiting peripheral BCx and BCx from permacath to rule out device infection. CXR unrevealing of PNA.  Appreciate ID input.  Monitor off Abx at this time.

## 2018-02-12 NOTE — PROGRESS NOTE ADULT - RS GEN PE MLT RESP DETAILS PC
diminished breath sounds, L/diminished breath sounds, R
clear to auscultation bilaterally/breath sounds equal/good air movement/respirations non-labored

## 2018-02-12 NOTE — PROGRESS NOTE ADULT - SUBJECTIVE AND OBJECTIVE BOX
CC: F/U for influenza.    SUBJECTIVE / OVERNIGHT EVENTS:  No new episodes of fever/chills, N/V, CP, SOB, Cough, lightheadedness, dizziness, abdominal pain, diarrhea, dysuria.    MEDICATIONS  (STANDING):  amiodarone    Tablet 200 milliGRAM(s) Oral daily  artificial tears (preservative free) Ophthalmic Solution 2 Drop(s) Both EYES four times a day  aspirin  chewable 81 milliGRAM(s) Oral daily  atorvastatin 40 milliGRAM(s) Oral at bedtime  buDESOnide  80 MICROgram(s)/formoterol 4.5 MICROgram(s) Inhaler 2 Puff(s) Inhalation two times a day  busPIRone 5 milliGRAM(s) Oral two times a day  clopidogrel Tablet 75 milliGRAM(s) Oral daily  dextrose 5%. 1000 milliLiter(s) (50 mL/Hr) IV Continuous <Continuous>  dextrose 50% Injectable 12.5 Gram(s) IV Push once  dextrose 50% Injectable 25 Gram(s) IV Push once  dextrose 50% Injectable 25 Gram(s) IV Push once  doxazosin 2 milliGRAM(s) Oral at bedtime  epoetin georgie Injectable 38605 Unit(s) IV Push <User Schedule>  influenza   Vaccine 0.5 milliLiter(s) IntraMuscular once  insulin lispro (HumaLOG) corrective regimen sliding scale   SubCutaneous every 6 hours  metoprolol     tartrate 25 milliGRAM(s) Oral two times a day  Nephro-sophie 1 Tablet(s) Oral daily  oseltamivir 30 milliGRAM(s) Oral <User Schedule>  pantoprazole    Tablet 40 milliGRAM(s) Oral before breakfast  sevelamer hydrochloride 1600 milliGRAM(s) Oral three times a day with meals  warfarin 3.5 milliGRAM(s) Oral once    MEDICATIONS  (PRN):  acetaminophen   Tablet 650 milliGRAM(s) Oral every 6 hours PRN For Temp greater than 38 C (100.4 F)  dextrose Gel 1 Dose(s) Oral once PRN Blood Glucose LESS THAN 70 milliGRAM(s)/deciliter  glucagon  Injectable 1 milliGRAM(s) IntraMuscular once PRN Glucose LESS THAN 70 milligrams/deciliter      Vital Signs Last 24 Hrs  T(C): 37 (12 Feb 2018 06:13), Max: 37.4 (11 Feb 2018 19:42)  T(F): 98.6 (12 Feb 2018 06:13), Max: 99.3 (11 Feb 2018 19:42)  HR: 73 (12 Feb 2018 06:13) (73 - 94)  BP: 126/61 (12 Feb 2018 06:13) (103/61 - 131/63)  BP(mean): --  RR: 17 (12 Feb 2018 06:13) (16 - 18)  SpO2: 98% (12 Feb 2018 06:13) (95% - 98%)  CAPILLARY BLOOD GLUCOSE      POCT Blood Glucose.: 101 mg/dL (12 Feb 2018 08:31)  POCT Blood Glucose.: 97 mg/dL (12 Feb 2018 06:24)  POCT Blood Glucose.: 113 mg/dL (11 Feb 2018 23:39)  POCT Blood Glucose.: 128 mg/dL (11 Feb 2018 21:46)  POCT Blood Glucose.: 138 mg/dL (11 Feb 2018 16:53)  POCT Blood Glucose.: 129 mg/dL (11 Feb 2018 12:22)    I&O's Summary      PHYSICAL EXAM:  GENERAL: elderly [place] M, NAD  HEAD:  Atraumatic, Normocephalic  EYES: EOMI, PERRLA, conjunctiva and sclera clear  NECK: Supple, No JVD  CHEST/LUNG: Clear to auscultation bilaterally; No wheeze  HEART: Regular rate and rhythm; No murmurs, rubs, or gallops  ABDOMEN: Soft, Nontender, Nondistended; Bowel sounds present  EXTREMITIES:  2+ Peripheral Pulses, No clubbing, cyanosis, or edema  PSYCH: calm, oriented self only  SKIN: No rashes or lesions  HD catheter R CW in place    LABS:                        8.4    6.73  )-----------( 256      ( 12 Feb 2018 04:50 )             28.8     02-12    140  |  100  |  41<H>  ----------------------------<  92  3.7   |  24  |  4.25<H>    Ca    8.3<L>      12 Feb 2018 04:50  Phos  3.4     02-12  Mg     2.0     02-12      PT/INR - ( 12 Feb 2018 04:50 )   PT: 30.8 SEC;   INR: 2.72          Culture - Blood (collected 10 Feb 2018 03:56)  Source: BLOOD PERIPHERAL  Preliminary Report (12 Feb 2018 03:55):    NO ORGANISMS ISOLATED    NO ORGANISMS ISOLATED AT 48 HRS.                RADIOLOGY & ADDITIONAL TESTS:    Imaging Personally Reviewed:    Care Discussed with Consultants/Other Providers:

## 2018-02-13 LAB
INR BLD: 2.57 — HIGH (ref 0.88–1.17)
PROTHROM AB SERPL-ACNC: 30.1 SEC — HIGH (ref 9.8–13.1)
SPECIMEN SOURCE: SIGNIFICANT CHANGE UP

## 2018-02-13 PROCEDURE — 99233 SBSQ HOSP IP/OBS HIGH 50: CPT

## 2018-02-13 RX ORDER — INSULIN LISPRO 100/ML
VIAL (ML) SUBCUTANEOUS
Qty: 0 | Refills: 0 | Status: DISCONTINUED | OUTPATIENT
Start: 2018-02-13 | End: 2018-02-15

## 2018-02-13 RX ORDER — WARFARIN SODIUM 2.5 MG/1
3.5 TABLET ORAL ONCE
Qty: 0 | Refills: 0 | Status: COMPLETED | OUTPATIENT
Start: 2018-02-13 | End: 2018-02-13

## 2018-02-13 RX ORDER — ACETAMINOPHEN 500 MG
650 TABLET ORAL ONCE
Qty: 0 | Refills: 0 | Status: COMPLETED | OUTPATIENT
Start: 2018-02-13 | End: 2018-02-13

## 2018-02-13 RX ADMIN — Medication 1: at 17:25

## 2018-02-13 RX ADMIN — Medication 2 MILLIGRAM(S): at 21:18

## 2018-02-13 RX ADMIN — WARFARIN SODIUM 3.5 MILLIGRAM(S): 2.5 TABLET ORAL at 17:34

## 2018-02-13 RX ADMIN — Medication 25 MILLIGRAM(S): at 17:34

## 2018-02-13 RX ADMIN — Medication 5 MILLIGRAM(S): at 05:28

## 2018-02-13 RX ADMIN — AMIODARONE HYDROCHLORIDE 200 MILLIGRAM(S): 400 TABLET ORAL at 05:28

## 2018-02-13 RX ADMIN — BUDESONIDE AND FORMOTEROL FUMARATE DIHYDRATE 2 PUFF(S): 160; 4.5 AEROSOL RESPIRATORY (INHALATION) at 08:50

## 2018-02-13 RX ADMIN — Medication 5 MILLIGRAM(S): at 17:34

## 2018-02-13 RX ADMIN — CLOPIDOGREL BISULFATE 75 MILLIGRAM(S): 75 TABLET, FILM COATED ORAL at 17:34

## 2018-02-13 RX ADMIN — SEVELAMER CARBONATE 1600 MILLIGRAM(S): 2400 POWDER, FOR SUSPENSION ORAL at 08:50

## 2018-02-13 RX ADMIN — ATORVASTATIN CALCIUM 40 MILLIGRAM(S): 80 TABLET, FILM COATED ORAL at 21:18

## 2018-02-13 RX ADMIN — BUDESONIDE AND FORMOTEROL FUMARATE DIHYDRATE 2 PUFF(S): 160; 4.5 AEROSOL RESPIRATORY (INHALATION) at 21:18

## 2018-02-13 RX ADMIN — SEVELAMER CARBONATE 1600 MILLIGRAM(S): 2400 POWDER, FOR SUSPENSION ORAL at 17:34

## 2018-02-13 RX ADMIN — Medication 650 MILLIGRAM(S): at 12:45

## 2018-02-13 RX ADMIN — Medication 81 MILLIGRAM(S): at 17:34

## 2018-02-13 RX ADMIN — Medication 2 DROP(S): at 00:13

## 2018-02-13 RX ADMIN — Medication 25 MILLIGRAM(S): at 05:28

## 2018-02-13 RX ADMIN — Medication 2 DROP(S): at 05:28

## 2018-02-13 RX ADMIN — PANTOPRAZOLE SODIUM 40 MILLIGRAM(S): 20 TABLET, DELAYED RELEASE ORAL at 05:29

## 2018-02-13 NOTE — PROVIDER CONTACT NOTE (OTHER) - ASSESSMENT
at baseline pt is A&Ox2, but pt is not opening eyes presently.   temp 100.3, bp 109/61, hr 84, O2 100% and

## 2018-02-13 NOTE — PROGRESS NOTE ADULT - PROBLEM SELECTOR PLAN 1
Influenza B detected. Tamiflu started. WBC in nml range. Awaiting peripheral BCx and BCx from permacath to rule out device infection. CXR unrevealing of PNA.  Appreciate ID input.  Monitor off Abx at this time.

## 2018-02-13 NOTE — PROVIDER CONTACT NOTE (OTHER) - ACTION/TREATMENT ORDERED:
ADS Irene #[id number] aware. Will administer Tylenol suppository and reschedule other meds. Will continue to monitor for changes.

## 2018-02-13 NOTE — PROGRESS NOTE ADULT - SUBJECTIVE AND OBJECTIVE BOX
No complaints      VITAL:  T(C): , Max: 37.8 (02-12-18 @ 11:41)  T(F): , Max: 100.1 (02-12-18 @ 11:41)  HR: 76 (02-13-18 @ 05:51)  BP: 119/68 (02-13-18 @ 05:51)  BP(mean): --  RR: 18 (02-13-18 @ 05:51)  SpO2: 98% (02-13-18 @ 05:51)      PHYSICAL EXAM:  Constitutional: NAD, cachectic  HEENT: NCAT, DMM  Neck:  No JVD  Respiratory: CTAB/L  Cardiovascular: S1 and S2  Gastrointestinal: BS+, soft, NT/ND  Extremities: No peripheral edema, contracted  Neurological: tone WNL  : No Landa  Skin: No rash  Access: (+)[facility] permcath      LABS:                        8.4    6.73  )-----------( 256      ( 12 Feb 2018 04:50 )             28.8     Na(140)/K(3.7)/Cl(100)/HCO3(24)/BUN(41)/Cr(4.25)Glu(92)/Ca(8.3)/Mg(2.0)/PO4(3.4)    02-12 @ 04:50      IMPRESSION: 72M w/ HTN, DM, CVA, ESRD on HD, COPD, A fib on coumadin, and dementia, 2/7/18 from Que w/ displaced tunneled dialysis catheter and c/b influenza    (1)Renal - ESRD-HD MWF; last dialyzed yesterday - due for next HD tomorrow  (2)Vascular - s/p successful permacath change. Immature AVF.  (3)Anemia - on Epogen with HD  (4)Influenza - on Tamiflu - dosed appropriately for ESRD-HD      RECOMMEND:  (1)HD today as ordered; Epogen with HD  (2)Tamiflu as ordered  (3)AVF maturation as outpatient.              Wong Pablo MD  [facility]  [phone] No complaints      VITAL:  T(C): , Max: 37.8 (02-12-18 @ 11:41)  T(F): , Max: 100.1 (02-12-18 @ 11:41)  HR: 76 (02-13-18 @ 05:51)  BP: 119/68 (02-13-18 @ 05:51)  BP(mean): --  RR: 18 (02-13-18 @ 05:51)  SpO2: 98% (02-13-18 @ 05:51)      PHYSICAL EXAM:  Constitutional: NAD, cachectic  HEENT: NCAT, DMM  Neck:  No JVD  Respiratory: CTAB/L  Cardiovascular: S1 and S2  Gastrointestinal: BS+, soft, NT/ND  Extremities: No peripheral edema, contracted  Neurological: tone WNL  : No Landa  Skin: No rash  Access: (+)RIJ permcath      LABS:                        8.4    6.73  )-----------( 256      ( 12 Feb 2018 04:50 )             28.8     Na(140)/K(3.7)/Cl(100)/HCO3(24)/BUN(41)/Cr(4.25)Glu(92)/Ca(8.3)/Mg(2.0)/PO4(3.4)    02-12 @ 04:50      IMPRESSION: 72M w/ HTN, DM, CVA, ESRD on HD, COPD, A fib on coumadin, and dementia, 2/7/18 from Que w/ displaced tunneled dialysis catheter and c/b influenza    (1)Renal - ESRD-HD MWF; last dialyzed yesterday - due for next HD tomorrow  (2)Vascular - s/p successful permacath change. Immature AVF.  (3)Anemia - on Epogen with HD  (4)Influenza - on Tamiflu - dosed appropriately for ESRD-HD      RECOMMEND:  (1)Next HD tomorrow  (2)Tamiflu as ordered  (3)AVF maturation as outpatient.              Wong Pablo MD  [facility]  [phone]

## 2018-02-13 NOTE — PROGRESS NOTE ADULT - ASSESSMENT
72M dementia, CAD, s/p 12-14 stents placed in [id number], admitted for 3 months end of 2017 at [facility] (s/p CAC, MI, CVA, infections), chronic CHFrEF, DM, HTN, AF (coumadin), CVAs, ESRD, radiculopathy with herniated disc, COPD, kidney stone with h/o urethral stents in the past, BPH, rehabing at [place], sent in after pt partially removed his permacath. Permacath replaced without problem.  p/w Influenza B infection.

## 2018-02-13 NOTE — PROGRESS NOTE ADULT - SUBJECTIVE AND OBJECTIVE BOX
CC: F/U for influenza.    SUBJECTIVE / OVERNIGHT EVENTS:  NO new issues overnight.  No F/C, N/V, CP, SOB, Cough, lightheadedness, dizziness, abdominal pain, diarrhea, dysuria.    MEDICATIONS  (STANDING):  amiodarone    Tablet 200 milliGRAM(s) Oral daily  artificial tears (preservative free) Ophthalmic Solution 2 Drop(s) Both EYES four times a day  aspirin  chewable 81 milliGRAM(s) Oral daily  atorvastatin 40 milliGRAM(s) Oral at bedtime  buDESOnide  80 MICROgram(s)/formoterol 4.5 MICROgram(s) Inhaler 2 Puff(s) Inhalation two times a day  busPIRone 5 milliGRAM(s) Oral two times a day  clopidogrel Tablet 75 milliGRAM(s) Oral daily  dextrose 5%. 1000 milliLiter(s) (50 mL/Hr) IV Continuous <Continuous>  dextrose 50% Injectable 12.5 Gram(s) IV Push once  dextrose 50% Injectable 25 Gram(s) IV Push once  dextrose 50% Injectable 25 Gram(s) IV Push once  doxazosin 2 milliGRAM(s) Oral at bedtime  epoetin georgie Injectable 97310 Unit(s) IV Push <User Schedule>  influenza   Vaccine 0.5 milliLiter(s) IntraMuscular once  insulin lispro (HumaLOG) corrective regimen sliding scale   SubCutaneous three times a day with meals  metoprolol     tartrate 25 milliGRAM(s) Oral two times a day  Nephro-sophie 1 Tablet(s) Oral daily  oseltamivir 30 milliGRAM(s) Oral <User Schedule>  pantoprazole    Tablet 40 milliGRAM(s) Oral before breakfast  sevelamer hydrochloride 1600 milliGRAM(s) Oral three times a day with meals  warfarin 3.5 milliGRAM(s) Oral once    MEDICATIONS  (PRN):  acetaminophen   Tablet 650 milliGRAM(s) Oral every 6 hours PRN For Temp greater than 38 C (100.4 F)  dextrose Gel 1 Dose(s) Oral once PRN Blood Glucose LESS THAN 70 milliGRAM(s)/deciliter  glucagon  Injectable 1 milliGRAM(s) IntraMuscular once PRN Glucose LESS THAN 70 milligrams/deciliter      Vital Signs Last 24 Hrs  T(C): 36.7 (13 Feb 2018 05:51), Max: 37.8 (12 Feb 2018 11:41)  T(F): 98 (13 Feb 2018 05:51), Max: 100.1 (12 Feb 2018 11:41)  HR: 76 (13 Feb 2018 05:51) (71 - 79)  BP: 119/68 (13 Feb 2018 05:51) (109/50 - 141/68)  BP(mean): --  RR: 18 (13 Feb 2018 05:51) (16 - 18)  SpO2: 98% (13 Feb 2018 05:51) (98% - 99%)  CAPILLARY BLOOD GLUCOSE      POCT Blood Glucose.: 89 mg/dL (13 Feb 2018 07:50)  POCT Blood Glucose.: 88 mg/dL (13 Feb 2018 05:31)  POCT Blood Glucose.: 76 mg/dL (13 Feb 2018 00:28)  POCT Blood Glucose.: 88 mg/dL (12 Feb 2018 22:00)  POCT Blood Glucose.: 198 mg/dL (12 Feb 2018 16:44)  POCT Blood Glucose.: 135 mg/dL (12 Feb 2018 11:59)    I&O's Summary    12 Feb 2018 07:01  -  13 Feb 2018 07:00  --------------------------------------------------------  IN: 400 mL / OUT: 1900 mL / NET: -1500 mL        PHYSICAL EXAM:  GENERAL: elderly [place] M, NAD  HEAD:  Atraumatic, Normocephalic  EYES: EOMI, PERRLA, conjunctiva and sclera clear  NECK: Supple, No JVD  CHEST/LUNG: Clear to auscultation bilaterally; No wheeze  HEART: Regular rate and rhythm; No murmurs, rubs, or gallops  ABDOMEN: Soft, Nontender, Nondistended; Bowel sounds present  EXTREMITIES:  2+ Peripheral Pulses, No clubbing, cyanosis, or edema  PSYCH: calm, oriented self only  SKIN: No rashes or lesions  HD catheter R CW in place    LABS:                        8.4    6.73  )-----------( 256      ( 12 Feb 2018 04:50 )             28.8     02-12    140  |  100  |  41<H>  ----------------------------<  92  3.7   |  24  |  4.25<H>    Ca    8.3<L>      12 Feb 2018 04:50  Phos  3.4     02-12  Mg     2.0     02-12      PT/INR - ( 13 Feb 2018 06:45 )   PT: 30.1 SEC;   INR: 2.57                    RADIOLOGY & ADDITIONAL TESTS:    Imaging Personally Reviewed:    Care Discussed with Consultants/Other Providers:

## 2018-02-14 LAB
BUN SERPL-MCNC: 14 MG/DL — SIGNIFICANT CHANGE UP (ref 7–23)
CALCIUM SERPL-MCNC: 8.9 MG/DL — SIGNIFICANT CHANGE UP (ref 8.4–10.5)
CHLORIDE SERPL-SCNC: 95 MMOL/L — LOW (ref 98–107)
CO2 SERPL-SCNC: 19 MMOL/L — LOW (ref 22–31)
CREAT SERPL-MCNC: 1.95 MG/DL — HIGH (ref 0.5–1.3)
GLUCOSE SERPL-MCNC: 153 MG/DL — HIGH (ref 70–99)
HCT VFR BLD CALC: 39.8 % — SIGNIFICANT CHANGE UP (ref 39–50)
HGB BLD-MCNC: 11.5 G/DL — LOW (ref 13–17)
INR BLD: 2.5 — HIGH (ref 0.88–1.17)
MCHC RBC-ENTMCNC: 26.7 PG — LOW (ref 27–34)
MCHC RBC-ENTMCNC: 28.9 % — LOW (ref 32–36)
MCV RBC AUTO: 92.6 FL — SIGNIFICANT CHANGE UP (ref 80–100)
NRBC # FLD: 0 — SIGNIFICANT CHANGE UP
PLATELET # BLD AUTO: 405 K/UL — HIGH (ref 150–400)
PMV BLD: 9.4 FL — SIGNIFICANT CHANGE UP (ref 7–13)
POTASSIUM SERPL-MCNC: 4.3 MMOL/L — SIGNIFICANT CHANGE UP (ref 3.5–5.3)
POTASSIUM SERPL-SCNC: 4.3 MMOL/L — SIGNIFICANT CHANGE UP (ref 3.5–5.3)
PROTHROM AB SERPL-ACNC: 28.3 SEC — HIGH (ref 9.8–13.1)
RBC # BLD: 4.3 M/UL — SIGNIFICANT CHANGE UP (ref 4.2–5.8)
RBC # FLD: 15 % — HIGH (ref 10.3–14.5)
SODIUM SERPL-SCNC: 140 MMOL/L — SIGNIFICANT CHANGE UP (ref 135–145)
WBC # BLD: 8.43 K/UL — SIGNIFICANT CHANGE UP (ref 3.8–10.5)
WBC # FLD AUTO: 8.43 K/UL — SIGNIFICANT CHANGE UP (ref 3.8–10.5)

## 2018-02-14 PROCEDURE — 99233 SBSQ HOSP IP/OBS HIGH 50: CPT

## 2018-02-14 RX ORDER — WARFARIN SODIUM 2.5 MG/1
3.5 TABLET ORAL ONCE
Qty: 0 | Refills: 0 | Status: COMPLETED | OUTPATIENT
Start: 2018-02-14 | End: 2018-02-14

## 2018-02-14 RX ADMIN — Medication 81 MILLIGRAM(S): at 17:42

## 2018-02-14 RX ADMIN — Medication 25 MILLIGRAM(S): at 05:26

## 2018-02-14 RX ADMIN — Medication 30 MILLIGRAM(S): at 17:40

## 2018-02-14 RX ADMIN — BUDESONIDE AND FORMOTEROL FUMARATE DIHYDRATE 2 PUFF(S): 160; 4.5 AEROSOL RESPIRATORY (INHALATION) at 21:27

## 2018-02-14 RX ADMIN — Medication 5 MILLIGRAM(S): at 05:26

## 2018-02-14 RX ADMIN — Medication 5 MILLIGRAM(S): at 17:40

## 2018-02-14 RX ADMIN — Medication 2 DROP(S): at 05:26

## 2018-02-14 RX ADMIN — Medication 2 DROP(S): at 17:42

## 2018-02-14 RX ADMIN — Medication 2 MILLIGRAM(S): at 21:27

## 2018-02-14 RX ADMIN — AMIODARONE HYDROCHLORIDE 200 MILLIGRAM(S): 400 TABLET ORAL at 05:26

## 2018-02-14 RX ADMIN — Medication 25 MILLIGRAM(S): at 17:40

## 2018-02-14 RX ADMIN — PANTOPRAZOLE SODIUM 40 MILLIGRAM(S): 20 TABLET, DELAYED RELEASE ORAL at 05:26

## 2018-02-14 RX ADMIN — ERYTHROPOIETIN 10000 UNIT(S): 10000 INJECTION, SOLUTION INTRAVENOUS; SUBCUTANEOUS at 13:18

## 2018-02-14 RX ADMIN — Medication 2 DROP(S): at 00:06

## 2018-02-14 RX ADMIN — Medication 1 TABLET(S): at 17:40

## 2018-02-14 RX ADMIN — ATORVASTATIN CALCIUM 40 MILLIGRAM(S): 80 TABLET, FILM COATED ORAL at 21:27

## 2018-02-14 RX ADMIN — WARFARIN SODIUM 3.5 MILLIGRAM(S): 2.5 TABLET ORAL at 17:39

## 2018-02-14 RX ADMIN — BUDESONIDE AND FORMOTEROL FUMARATE DIHYDRATE 2 PUFF(S): 160; 4.5 AEROSOL RESPIRATORY (INHALATION) at 10:50

## 2018-02-14 RX ADMIN — CLOPIDOGREL BISULFATE 75 MILLIGRAM(S): 75 TABLET, FILM COATED ORAL at 17:42

## 2018-02-14 RX ADMIN — SEVELAMER CARBONATE 1600 MILLIGRAM(S): 2400 POWDER, FOR SUSPENSION ORAL at 17:42

## 2018-02-14 NOTE — PROGRESS NOTE ADULT - ASSESSMENT
72M dementia, CAD, s/p 12-14 stents placed in [id number], admitted for 3 months end of 2017 at [facility] (s/p CAC, MI, CVA, infections), chronic CHFrEF, DM, HTN, AF (coumadin), CVAs, ESRD, radiculopathy with herniated disc, COPD, kidney stone with h/o urethral stents in the past, BPH, rehabing at [place], sent in after pt partially removed his permacath. Permacath replaced without problem.  p/w Influenza B infection.

## 2018-02-14 NOTE — PROGRESS NOTE ADULT - SUBJECTIVE AND OBJECTIVE BOX
CC: F/U for influenza    SUBJECTIVE / OVERNIGHT EVENTS:  No new issues.  No F/C, N/V, CP, SOB, Cough, lightheadedness, dizziness, abdominal pain, diarrhea, dysuria.    MEDICATIONS  (STANDING):  amiodarone    Tablet 200 milliGRAM(s) Oral daily  artificial tears (preservative free) Ophthalmic Solution 2 Drop(s) Both EYES four times a day  aspirin  chewable 81 milliGRAM(s) Oral daily  atorvastatin 40 milliGRAM(s) Oral at bedtime  buDESOnide  80 MICROgram(s)/formoterol 4.5 MICROgram(s) Inhaler 2 Puff(s) Inhalation two times a day  busPIRone 5 milliGRAM(s) Oral two times a day  clopidogrel Tablet 75 milliGRAM(s) Oral daily  dextrose 5%. 1000 milliLiter(s) (50 mL/Hr) IV Continuous <Continuous>  dextrose 50% Injectable 12.5 Gram(s) IV Push once  dextrose 50% Injectable 25 Gram(s) IV Push once  dextrose 50% Injectable 25 Gram(s) IV Push once  doxazosin 2 milliGRAM(s) Oral at bedtime  epoetin georgie Injectable 54308 Unit(s) IV Push <User Schedule>  influenza   Vaccine 0.5 milliLiter(s) IntraMuscular once  insulin lispro (HumaLOG) corrective regimen sliding scale   SubCutaneous three times a day with meals  metoprolol     tartrate 25 milliGRAM(s) Oral two times a day  Nephro-sophie 1 Tablet(s) Oral daily  oseltamivir 30 milliGRAM(s) Oral once  pantoprazole    Tablet 40 milliGRAM(s) Oral before breakfast  sevelamer hydrochloride 1600 milliGRAM(s) Oral three times a day with meals  warfarin 3.5 milliGRAM(s) Oral once    MEDICATIONS  (PRN):  acetaminophen   Tablet 650 milliGRAM(s) Oral every 6 hours PRN For Temp greater than 38 C (100.4 F)  dextrose Gel 1 Dose(s) Oral once PRN Blood Glucose LESS THAN 70 milliGRAM(s)/deciliter  glucagon  Injectable 1 milliGRAM(s) IntraMuscular once PRN Glucose LESS THAN 70 milligrams/deciliter      Vital Signs Last 24 Hrs  T(C): 36.7 (14 Feb 2018 11:00), Max: 37.8 (13 Feb 2018 14:56)  T(F): 98 (14 Feb 2018 11:00), Max: 100 (13 Feb 2018 14:56)  HR: 75 (14 Feb 2018 11:00) (68 - 75)  BP: 150/72 (14 Feb 2018 11:00) (116/60 - 150/72)  BP(mean): --  RR: 18 (14 Feb 2018 11:00) (17 - 18)  SpO2: 98% (14 Feb 2018 06:06) (97% - 98%)  CAPILLARY BLOOD GLUCOSE      POCT Blood Glucose.: 92 mg/dL (14 Feb 2018 13:14)  POCT Blood Glucose.: 100 mg/dL (14 Feb 2018 07:47)  POCT Blood Glucose.: 103 mg/dL (13 Feb 2018 23:49)  POCT Blood Glucose.: 171 mg/dL (13 Feb 2018 17:03)    I&O's Summary      PHYSICAL EXAM:  GENERAL: elderly Nepalese M, NAD  HEAD:  Atraumatic, Normocephalic  EYES: EOMI, PERRLA, conjunctiva and sclera clear  NECK: Supple, No JVD  CHEST/LUNG: Clear to auscultation bilaterally; No wheeze  HEART: Regular rate and rhythm; No murmurs, rubs, or gallops  ABDOMEN: Soft, Nontender, Nondistended; Bowel sounds present  EXTREMITIES:  2+ Peripheral Pulses, No clubbing, cyanosis, or edema  PSYCH: calm, oriented self only  SKIN: No rashes or lesions  HD catheter R CW in place    LABS:          PT/INR - ( 14 Feb 2018 05:30 )   PT: 28.3 SEC;   INR: 2.50                    RADIOLOGY & ADDITIONAL TESTS:    Imaging Personally Reviewed:    Care Discussed with Consultants/Other Providers:

## 2018-02-14 NOTE — PROGRESS NOTE ADULT - SUBJECTIVE AND OBJECTIVE BOX
Minimally communicative    VITAL:  T(C): , Max: 37.9 (02-13-18 @ 12:15)  T(F): , Max: 100.3 (02-13-18 @ 12:15)  HR: 71 (02-14-18 @ 06:06)  BP: 121/63 (02-14-18 @ 06:06)  BP(mean): --  RR: 18 (02-14-18 @ 06:06)  SpO2: 98% (02-14-18 @ 06:06)      PHYSICAL EXAM:  Constitutional: NAD, cachectic  HEENT: NCAT, DMM  Neck:  No JVD  Respiratory: CTAB/L  Cardiovascular: S1 and S2  Gastrointestinal: BS+, soft, NT/ND  Extremities: No peripheral edema, contracted  Neurological: tone WNL  : No Landa  Skin: No rash  Access: (+)RIJ permcath      LABS:  Na(140)/K(3.7)/Cl(100)/HCO3(24)/BUN(41)/Cr(4.25)Glu(92)/Ca(8.3)/Mg(2.0)/PO4(3.4)    02-12 @ 04:50      IMPRESSION: 72M w/ HTN, DM, CVA, ESRD on HD, COPD, A fib on coumadin, and dementia, 2/7/18 from Que w/ displaced tunneled dialysis catheter and c/b influenza    (1)Renal - ESRD-HD MWF; last dialyzed Monday 2/12 - due for HD today  (2)Vascular - s/p successful permacath change. Immature AVF.  (3)Anemia - on Epogen with HD  (4)Influenza - on Tamiflu - dosed appropriately for ESRD-HD      RECOMMEND:  (1)Next HD today as ordered  (2)Tamiflu as ordered  (3)AVF maturation as outpatient.            Wnog Pablo MD  [facility]  [phone] No complaints    VITAL:  T(C): , Max: 37.9 (02-13-18 @ 12:15)  T(F): , Max: 100.3 (02-13-18 @ 12:15)  HR: 71 (02-14-18 @ 06:06)  BP: 121/63 (02-14-18 @ 06:06)  BP(mean): --  RR: 18 (02-14-18 @ 06:06)  SpO2: 98% (02-14-18 @ 06:06)      PHYSICAL EXAM:  Constitutional: NAD, cachectic  HEENT: NCAT, DMM  Neck:  No JVD  Respiratory: CTAB/L  Cardiovascular: S1 and S2  Gastrointestinal: BS+, soft, NT/ND  Extremities: No peripheral edema, contracted  Neurological: tone WNL  : No Landa  Skin: No rash  Access: (+)RIJ permcath      LABS:  Na(140)/K(3.7)/Cl(100)/HCO3(24)/BUN(41)/Cr(4.25)Glu(92)/Ca(8.3)/Mg(2.0)/PO4(3.4)    02-12 @ 04:50      IMPRESSION: 72M w/ HTN, DM, CVA, ESRD on HD, COPD, A fib on coumadin, and dementia, 2/7/18 from Que w/ displaced tunneled dialysis catheter and c/b influenza    (1)Renal - ESRD-HD MWF; last dialyzed Monday 2/12 - due for HD today  (2)Vascular - s/p successful permacath change. Immature AVF.  (3)Anemia - on Epogen with HD  (4)Influenza - on Tamiflu - dosed appropriately for ESRD-HD      RECOMMEND:  (1)Next HD today as ordered  (2)Tamiflu as ordered  (3)AVF maturation as outpatient.  (4)No objection to discharge post HD today          Wong Pablo MD  [facility]  [phone]

## 2018-02-15 VITALS
OXYGEN SATURATION: 100 % | HEART RATE: 75 BPM | DIASTOLIC BLOOD PRESSURE: 65 MMHG | TEMPERATURE: 100 F | RESPIRATION RATE: 17 BRPM | SYSTOLIC BLOOD PRESSURE: 119 MMHG

## 2018-02-15 LAB
BACTERIA BLD CULT: SIGNIFICANT CHANGE UP
INR BLD: 3.73 — HIGH (ref 0.88–1.17)
PROTHROM AB SERPL-ACNC: 44.1 SEC — HIGH (ref 9.8–13.1)

## 2018-02-15 PROCEDURE — 99239 HOSP IP/OBS DSCHRG MGMT >30: CPT

## 2018-02-15 RX ADMIN — Medication 2 DROP(S): at 14:20

## 2018-02-15 RX ADMIN — Medication 5 MILLIGRAM(S): at 05:42

## 2018-02-15 RX ADMIN — Medication 1 TABLET(S): at 12:21

## 2018-02-15 RX ADMIN — BUDESONIDE AND FORMOTEROL FUMARATE DIHYDRATE 2 PUFF(S): 160; 4.5 AEROSOL RESPIRATORY (INHALATION) at 12:22

## 2018-02-15 RX ADMIN — AMIODARONE HYDROCHLORIDE 200 MILLIGRAM(S): 400 TABLET ORAL at 05:42

## 2018-02-15 RX ADMIN — SEVELAMER CARBONATE 1600 MILLIGRAM(S): 2400 POWDER, FOR SUSPENSION ORAL at 12:22

## 2018-02-15 RX ADMIN — Medication 81 MILLIGRAM(S): at 12:21

## 2018-02-15 RX ADMIN — Medication 2 DROP(S): at 00:03

## 2018-02-15 RX ADMIN — SEVELAMER CARBONATE 1600 MILLIGRAM(S): 2400 POWDER, FOR SUSPENSION ORAL at 09:08

## 2018-02-15 RX ADMIN — CLOPIDOGREL BISULFATE 75 MILLIGRAM(S): 75 TABLET, FILM COATED ORAL at 12:21

## 2018-02-15 RX ADMIN — Medication 2 DROP(S): at 05:42

## 2018-02-15 RX ADMIN — PANTOPRAZOLE SODIUM 40 MILLIGRAM(S): 20 TABLET, DELAYED RELEASE ORAL at 05:42

## 2018-02-15 RX ADMIN — Medication 1: at 12:21

## 2018-02-15 RX ADMIN — Medication 1: at 09:07

## 2018-02-15 RX ADMIN — Medication 25 MILLIGRAM(S): at 05:42

## 2018-02-15 NOTE — PROGRESS NOTE ADULT - PROBLEM SELECTOR PLAN 9
wound care consult
- Continue inhalers
wound care consult

## 2018-02-15 NOTE — PROGRESS NOTE ADULT - SUBJECTIVE AND OBJECTIVE BOX
CC: F/U for influenza    SUBJECTIVE / OVERNIGHT EVENTS:  No new issues overnight.  No F/C, N/V, CP, SOB, Cough, lightheadedness, dizziness, abdominal pain, diarrhea, dysuria.    MEDICATIONS  (STANDING):  amiodarone    Tablet 200 milliGRAM(s) Oral daily  artificial tears (preservative free) Ophthalmic Solution 2 Drop(s) Both EYES four times a day  aspirin  chewable 81 milliGRAM(s) Oral daily  atorvastatin 40 milliGRAM(s) Oral at bedtime  buDESOnide  80 MICROgram(s)/formoterol 4.5 MICROgram(s) Inhaler 2 Puff(s) Inhalation two times a day  busPIRone 5 milliGRAM(s) Oral two times a day  clopidogrel Tablet 75 milliGRAM(s) Oral daily  dextrose 5%. 1000 milliLiter(s) (50 mL/Hr) IV Continuous <Continuous>  dextrose 50% Injectable 12.5 Gram(s) IV Push once  dextrose 50% Injectable 25 Gram(s) IV Push once  dextrose 50% Injectable 25 Gram(s) IV Push once  doxazosin 2 milliGRAM(s) Oral at bedtime  epoetin georgie Injectable 54093 Unit(s) IV Push <User Schedule>  influenza   Vaccine 0.5 milliLiter(s) IntraMuscular once  insulin lispro (HumaLOG) corrective regimen sliding scale   SubCutaneous three times a day with meals  metoprolol     tartrate 25 milliGRAM(s) Oral two times a day  Nephro-sophie 1 Tablet(s) Oral daily  pantoprazole    Tablet 40 milliGRAM(s) Oral before breakfast  sevelamer hydrochloride 1600 milliGRAM(s) Oral three times a day with meals    MEDICATIONS  (PRN):  acetaminophen   Tablet 650 milliGRAM(s) Oral every 6 hours PRN For Temp greater than 38 C (100.4 F)  dextrose Gel 1 Dose(s) Oral once PRN Blood Glucose LESS THAN 70 milliGRAM(s)/deciliter  glucagon  Injectable 1 milliGRAM(s) IntraMuscular once PRN Glucose LESS THAN 70 milligrams/deciliter      Vital Signs Last 24 Hrs  T(C): 37.1 (15 Feb 2018 05:39), Max: 37.1 (15 Feb 2018 05:39)  T(F): 98.8 (15 Feb 2018 05:39), Max: 98.8 (15 Feb 2018 05:39)  HR: 83 (15 Feb 2018 05:39) (79 - 96)  BP: 109/61 (15 Feb 2018 05:39) (99/51 - 114/56)  BP(mean): --  RR: 16 (15 Feb 2018 05:39) (14 - 18)  SpO2: 99% (15 Feb 2018 05:39) (96% - 100%)  CAPILLARY BLOOD GLUCOSE      POCT Blood Glucose.: 157 mg/dL (15 Feb 2018 07:33)  POCT Blood Glucose.: 215 mg/dL (14 Feb 2018 21:29)  POCT Blood Glucose.: 146 mg/dL (14 Feb 2018 16:46)  POCT Blood Glucose.: 92 mg/dL (14 Feb 2018 13:14)    I&O's Summary    14 Feb 2018 07:01  -  15 Feb 2018 07:00  --------------------------------------------------------  IN: 400 mL / OUT: 1900 mL / NET: -1500 mL        PHYSICAL EXAM:  GENERAL: elderly Iraqi M, NAD  HEAD:  Atraumatic, Normocephalic  EYES: EOMI, PERRLA, conjunctiva and sclera clear  NECK: Supple, No JVD  CHEST/LUNG: Clear to auscultation bilaterally; No wheeze  HEART: Regular rate and rhythm; No murmurs, rubs, or gallops  ABDOMEN: Soft, Nontender, Nondistended; Bowel sounds present  EXTREMITIES:  2+ Peripheral Pulses, No clubbing, cyanosis, or edema  PSYCH: calm, oriented self only  SKIN: No rashes or lesions  HD catheter R CW in place    LABS:                        11.5   8.43  )-----------( 405      ( 14 Feb 2018 16:36 )             39.8     02-14    140  |  95<L>  |  14  ----------------------------<  153<H>  4.3   |  19<L>  |  1.95<H>    Ca    8.9      14 Feb 2018 16:36      PT/INR - ( 15 Feb 2018 05:44 )   PT: 44.1 SEC;   INR: 3.73                    RADIOLOGY & ADDITIONAL TESTS:    Imaging Personally Reviewed:    Care Discussed with Consultants/Other Providers:

## 2018-02-15 NOTE — PROGRESS NOTE ADULT - PROBLEM SELECTOR PROBLEM 9
Decubitus ulcer of sacral region, unstageable
Chronic obstructive pulmonary disease, unspecified COPD type
Decubitus ulcer of sacral region, unstageable

## 2018-02-15 NOTE — PROGRESS NOTE ADULT - PROVIDER SPECIALTY LIST ADULT
Anesthesia
Hospitalist
Nephrology
Infectious Disease
Hospitalist

## 2018-02-15 NOTE — PROGRESS NOTE ADULT - ASSESSMENT
72M w/ HTN, DM, CVA, ESRD on HD, COPD, A fib on coumadin, and dementia, 2/7/18 from [facility] w/ displaced tunneled dialysis catheter and c/b influenza  (1)Renal - ESRD-HD MWF  (2)Vascular - s/p successful permacath change. Immature AVF.  (3)Anemia - on Epogen with HD  (4)Influenza - on Tamiflu - dosed appropriately for ESRD-HD    RECOMMEND:  (1)Next HD tomorrow as ordered  (2)Tamiflu as ordered  (3)AVF maturation as outpatient  (4)No objection to discharge    Felicity Cifuentes NP  [facility]  [phone]

## 2018-02-15 NOTE — PROGRESS NOTE ADULT - ATTENDING COMMENTS
Patient medically optimized for discharge for [facility].  Discharge planning 40 minutes - discussed with patient and consultants.

## 2018-02-15 NOTE — PROGRESS NOTE ADULT - ASSESSMENT
72M dementia, CAD, s/p 12-14 stents placed in [id number], admitted for 3 months end of 2017 at [facility] (s/p CAC, MI, CVA, infections), chronic CHFrEF, DM, HTN, AF (coumadin), CVAs, ESRD, radiculopathy with herniated disc, COPD, kidney stone with h/o urethral stents in the past, BPH, rehabing at [place], sent in after pt partially removed his permacath. Permacath replaced without problem.  p/w Influenza B infection.

## 2018-02-15 NOTE — PROGRESS NOTE ADULT - SUBJECTIVE AND OBJECTIVE BOX
NEPHROLOGY - NSN    Patient seen and examined; no complaints.     MEDICATIONS  (STANDING):  amiodarone    Tablet 200 milliGRAM(s) Oral daily  artificial tears (preservative free) Ophthalmic Solution 2 Drop(s) Both EYES four times a day  aspirin  chewable 81 milliGRAM(s) Oral daily  atorvastatin 40 milliGRAM(s) Oral at bedtime  buDESOnide  80 MICROgram(s)/formoterol 4.5 MICROgram(s) Inhaler 2 Puff(s) Inhalation two times a day  busPIRone 5 milliGRAM(s) Oral two times a day  clopidogrel Tablet 75 milliGRAM(s) Oral daily  dextrose 5%. 1000 milliLiter(s) (50 mL/Hr) IV Continuous <Continuous>  dextrose 50% Injectable 12.5 Gram(s) IV Push once  dextrose 50% Injectable 25 Gram(s) IV Push once  dextrose 50% Injectable 25 Gram(s) IV Push once  doxazosin 2 milliGRAM(s) Oral at bedtime  epoetin georgie Injectable 86314 Unit(s) IV Push <User Schedule>  influenza   Vaccine 0.5 milliLiter(s) IntraMuscular once  insulin lispro (HumaLOG) corrective regimen sliding scale   SubCutaneous three times a day with meals  metoprolol     tartrate 25 milliGRAM(s) Oral two times a day  Nephro-sophie 1 Tablet(s) Oral daily  pantoprazole    Tablet 40 milliGRAM(s) Oral before breakfast  sevelamer hydrochloride 1600 milliGRAM(s) Oral three times a day with meals    VITAL:  T(C): , Max: 37.7 (02-15-18 @ 15:20)  T(F): , Max: 99.8 (02-15-18 @ 15:20)  HR: 75 (02-15-18 @ 15:20)  BP: 119/65 (02-15-18 @ 15:20)  BP(mean): --  RR: 17 (02-15-18 @ 15:20)  SpO2: 100% (02-15-18 @ 15:20)  Wt(kg): --  I and O's:    02-14 @ 07:01  -  02-15 @ 07:00  --------------------------------------------------------  IN: 400 mL / OUT: 1900 mL / NET: -1500 mL      REVIEW OF SYSTEMS:  As per HPI    PHYSICAL EXAM:  Constitutional: NAD, cachectic   Respiratory: diminished B/L  Cardiovascular: S1 and S2  Gastrointestinal: BS+, soft, NT/ND  Extremities: No peripheral edema  Neurological: awake  : No Landa  Access: [facility]    LABS:                        11.5   8.43  )-----------( 405      ( 14 Feb 2018 16:36 )             39.8     02-14    140  |  95<L>  |  14  ----------------------------<  153<H>  4.3   |  19<L>  |  1.95<H>    Ca    8.9      14 Feb 2018 16:36

## 2018-02-15 NOTE — PROGRESS NOTE ADULT - PROBLEM SELECTOR PROBLEM 1
End stage renal failure on dialysis
Fever, unspecified fever cause
Fever, unspecified fever cause
Influenza B
Fever, unspecified fever cause
End stage renal failure on dialysis
Influenza B

## 2018-02-17 LAB — BACTERIA BLD CULT: SIGNIFICANT CHANGE UP

## 2018-02-20 NOTE — PROGRESS NOTE ADULT - SUBJECTIVE AND OBJECTIVE BOX
JIMI BUCHANAN 72y [id number]    Patient is a 72y old  Male who presents with a chief complaint of Removed permacath (09 Feb 2018 13:43)      Follow Up/CC:  fever    Interval History/ROS: flu+    Allergies    No Known Allergies    Intolerances        ANTIMICROBIALS:  oseltamivir 30 <User Schedule>      MEDICATIONS  (STANDING):  amiodarone    Tablet 200 milliGRAM(s) Oral daily  artificial tears (preservative free) Ophthalmic Solution 2 Drop(s) Both EYES four times a day  aspirin  chewable 81 milliGRAM(s) Oral daily  atorvastatin 40 milliGRAM(s) Oral at bedtime  buDESOnide  80 MICROgram(s)/formoterol 4.5 MICROgram(s) Inhaler 2 Puff(s) Inhalation two times a day  busPIRone 5 milliGRAM(s) Oral two times a day  clopidogrel Tablet 75 milliGRAM(s) Oral daily  dextrose 5%. 1000 milliLiter(s) (50 mL/Hr) IV Continuous <Continuous>  dextrose 50% Injectable 12.5 Gram(s) IV Push once  dextrose 50% Injectable 25 Gram(s) IV Push once  dextrose 50% Injectable 25 Gram(s) IV Push once  doxazosin 2 milliGRAM(s) Oral at bedtime  epoetin georgie Injectable 44379 Unit(s) IV Push <User Schedule>  influenza   Vaccine 0.5 milliLiter(s) IntraMuscular once  insulin lispro (HumaLOG) corrective regimen sliding scale   SubCutaneous every 6 hours  metoprolol     tartrate 25 milliGRAM(s) Oral two times a day  Nephro-sophie 1 Tablet(s) Oral daily  oseltamivir 30 milliGRAM(s) Oral <User Schedule>  pantoprazole    Tablet 40 milliGRAM(s) Oral before breakfast  sevelamer hydrochloride 1600 milliGRAM(s) Oral three times a day with meals  warfarin 3.5 milliGRAM(s) Oral once    MEDICATIONS  (PRN):  acetaminophen   Tablet 650 milliGRAM(s) Oral every 6 hours PRN For Temp greater than 38 C (100.4 F)  dextrose Gel 1 Dose(s) Oral once PRN Blood Glucose LESS THAN 70 milliGRAM(s)/deciliter  glucagon  Injectable 1 milliGRAM(s) IntraMuscular once PRN Glucose LESS THAN 70 milligrams/deciliter        Vital Signs Last 24 Hrs  T(C): 37.8 (12 Feb 2018 11:41), Max: 37.8 (12 Feb 2018 11:41)  T(F): 100.1 (12 Feb 2018 11:41), Max: 100.1 (12 Feb 2018 11:41)  HR: 79 (12 Feb 2018 11:41) (73 - 94)  BP: 124/58 (12 Feb 2018 11:41) (124/58 - 131/63)  BP(mean): --  RR: 17 (12 Feb 2018 11:41) (16 - 17)  SpO2: 99% (12 Feb 2018 11:41) (95% - 99%)    CBC Full  -  ( 12 Feb 2018 04:50 )  WBC Count : 6.73 K/uL  Hemoglobin : 8.4 g/dL  Hematocrit : 28.8 %  Platelet Count - Automated : 256 K/uL  Mean Cell Volume : 94.4 fL  Mean Cell Hemoglobin : 27.5 pg  Mean Cell Hemoglobin Concentration : 29.2 %  Auto Neutrophil # : 3.87 K/uL  Auto Lymphocyte # : 2.31 K/uL  Auto Monocyte # : 0.48 K/uL  Auto Eosinophil # : 0.05 K/uL  Auto Basophil # : 0.01 K/uL  Auto Neutrophil % : 57.7 %  Auto Lymphocyte % : 34.3 %  Auto Monocyte % : 7.1 %  Auto Eosinophil % : 0.7 %  Auto Basophil % : 0.1 %    02-12    140  |  100  |  41<H>  ----------------------------<  92  3.7   |  24  |  4.25<H>    Ca    8.3<L>      12 Feb 2018 04:50  Phos  3.4     02-12  Mg     2.0     02-12            MICROBIOLOGY:  BLOOD PERIPHERAL  02-10-18 --  --  --              v            RADIOLOGY 45

## 2018-06-06 NOTE — PROGRESS NOTE ADULT - PROBLEM SELECTOR PLAN 3
Patient with worsening systolic dysfunction s/p NSTEMI/arrest  - daughter declining PPM at this time,   - c/w fluid removal by intermittent HD  - c/w metoprolol 25mg BID  - cardio following No

## 2018-06-18 ENCOUNTER — INPATIENT (INPATIENT)
Facility: HOSPITAL | Age: 73
LOS: 17 days | Discharge: NURSING, REHAB OR LONG-TERM CARE | End: 2018-07-06
Attending: INTERNAL MEDICINE | Admitting: INTERNAL MEDICINE
Payer: MEDICARE

## 2018-06-18 VITALS
OXYGEN SATURATION: 99 % | RESPIRATION RATE: 18 BRPM | TEMPERATURE: 99 F | DIASTOLIC BLOOD PRESSURE: 48 MMHG | SYSTOLIC BLOOD PRESSURE: 117 MMHG | HEART RATE: 82 BPM

## 2018-06-18 DIAGNOSIS — Z98.890 OTHER SPECIFIED POSTPROCEDURAL STATES: Chronic | ICD-10-CM

## 2018-06-18 NOTE — ED ADULT TRIAGE NOTE - CHIEF COMPLAINT QUOTE
Pt from [facility], sent for shortness of breath.  Per EMS, pt had low SpO2 all day (Monday) and was due for dialysis, but did not get dialyzed due to low SpO2.  EMS states pt was 88% on room air upon arrival, placed on non-rebreather.  Per paperwork, pt is DNR/DNI,

## 2018-06-19 DIAGNOSIS — I25.10 ATHEROSCLEROTIC HEART DISEASE OF NATIVE CORONARY ARTERY WITHOUT ANGINA PECTORIS: ICD-10-CM

## 2018-06-19 DIAGNOSIS — D64.9 ANEMIA, UNSPECIFIED: ICD-10-CM

## 2018-06-19 DIAGNOSIS — R74.8 ABNORMAL LEVELS OF OTHER SERUM ENZYMES: ICD-10-CM

## 2018-06-19 DIAGNOSIS — E11.22 TYPE 2 DIABETES MELLITUS WITH DIABETIC CHRONIC KIDNEY DISEASE: ICD-10-CM

## 2018-06-19 DIAGNOSIS — I48.91 UNSPECIFIED ATRIAL FIBRILLATION: ICD-10-CM

## 2018-06-19 DIAGNOSIS — N18.6 END STAGE RENAL DISEASE: ICD-10-CM

## 2018-06-19 DIAGNOSIS — I15.0 RENOVASCULAR HYPERTENSION: ICD-10-CM

## 2018-06-19 DIAGNOSIS — F31.9 BIPOLAR DISORDER, UNSPECIFIED: ICD-10-CM

## 2018-06-19 DIAGNOSIS — I50.23 ACUTE ON CHRONIC SYSTOLIC (CONGESTIVE) HEART FAILURE: ICD-10-CM

## 2018-06-19 DIAGNOSIS — J96.01 ACUTE RESPIRATORY FAILURE WITH HYPOXIA: ICD-10-CM

## 2018-06-19 DIAGNOSIS — J81.0 ACUTE PULMONARY EDEMA: ICD-10-CM

## 2018-06-19 LAB
ALBUMIN SERPL ELPH-MCNC: 3.1 G/DL — LOW (ref 3.3–5)
ALP SERPL-CCNC: 91 U/L — SIGNIFICANT CHANGE UP (ref 40–120)
ALT FLD-CCNC: 31 U/L — SIGNIFICANT CHANGE UP (ref 4–41)
APTT BLD: 34.6 SEC — SIGNIFICANT CHANGE UP (ref 27.5–37.4)
AST SERPL-CCNC: 20 U/L — SIGNIFICANT CHANGE UP (ref 4–40)
BASE EXCESS BLDV CALC-SCNC: -0.9 MMOL/L — SIGNIFICANT CHANGE UP
BASOPHILS # BLD AUTO: 0.03 K/UL — SIGNIFICANT CHANGE UP (ref 0–0.2)
BASOPHILS NFR BLD AUTO: 0.2 % — SIGNIFICANT CHANGE UP (ref 0–2)
BILIRUB SERPL-MCNC: 0.3 MG/DL — SIGNIFICANT CHANGE UP (ref 0.2–1.2)
BLOOD GAS VENOUS - CREATININE: 7.95 MG/DL — HIGH (ref 0.5–1.3)
BUN SERPL-MCNC: 85 MG/DL — HIGH (ref 7–23)
CALCIUM SERPL-MCNC: 8.6 MG/DL — SIGNIFICANT CHANGE UP (ref 8.4–10.5)
CHLORIDE BLDV-SCNC: 96 MMOL/L — SIGNIFICANT CHANGE UP (ref 96–108)
CHLORIDE SERPL-SCNC: 89 MMOL/L — LOW (ref 98–107)
CO2 SERPL-SCNC: 22 MMOL/L — SIGNIFICANT CHANGE UP (ref 22–31)
CREAT SERPL-MCNC: 7.78 MG/DL — HIGH (ref 0.5–1.3)
EOSINOPHIL # BLD AUTO: 0.02 K/UL — SIGNIFICANT CHANGE UP (ref 0–0.5)
EOSINOPHIL NFR BLD AUTO: 0.1 % — SIGNIFICANT CHANGE UP (ref 0–6)
GAS PNL BLDV: 127 MMOL/L — LOW (ref 136–146)
GLUCOSE BLDC GLUCOMTR-MCNC: 120 MG/DL — HIGH (ref 70–99)
GLUCOSE BLDC GLUCOMTR-MCNC: 179 MG/DL — HIGH (ref 70–99)
GLUCOSE BLDV-MCNC: 392 — HIGH (ref 70–99)
GLUCOSE SERPL-MCNC: 388 MG/DL — HIGH (ref 70–99)
HBV SURFACE AG SER-ACNC: NEGATIVE — SIGNIFICANT CHANGE UP
HCO3 BLDV-SCNC: 23 MMOL/L — SIGNIFICANT CHANGE UP (ref 20–27)
HCT VFR BLD CALC: 25.7 % — LOW (ref 39–50)
HCT VFR BLDV CALC: 26 % — LOW (ref 39–51)
HGB BLD-MCNC: 8 G/DL — LOW (ref 13–17)
HGB BLDV-MCNC: 8.4 G/DL — LOW (ref 13–17)
IMM GRANULOCYTES # BLD AUTO: 0.07 # — SIGNIFICANT CHANGE UP
IMM GRANULOCYTES NFR BLD AUTO: 0.5 % — SIGNIFICANT CHANGE UP (ref 0–1.5)
INR BLD: 1.6 — HIGH (ref 0.88–1.17)
LACTATE BLDV-MCNC: 2.6 MMOL/L — HIGH (ref 0.5–2)
LYMPHOCYTES # BLD AUTO: 0.86 K/UL — LOW (ref 1–3.3)
LYMPHOCYTES # BLD AUTO: 6 % — LOW (ref 13–44)
MCHC RBC-ENTMCNC: 27.2 PG — SIGNIFICANT CHANGE UP (ref 27–34)
MCHC RBC-ENTMCNC: 31.1 % — LOW (ref 32–36)
MCV RBC AUTO: 87.4 FL — SIGNIFICANT CHANGE UP (ref 80–100)
MONOCYTES # BLD AUTO: 0.71 K/UL — SIGNIFICANT CHANGE UP (ref 0–0.9)
MONOCYTES NFR BLD AUTO: 5 % — SIGNIFICANT CHANGE UP (ref 2–14)
NEUTROPHILS # BLD AUTO: 12.6 K/UL — HIGH (ref 1.8–7.4)
NEUTROPHILS NFR BLD AUTO: 88.2 % — HIGH (ref 43–77)
NRBC # FLD: 0 — SIGNIFICANT CHANGE UP
PCO2 BLDV: 45 MMHG — SIGNIFICANT CHANGE UP (ref 41–51)
PH BLDV: 7.35 PH — SIGNIFICANT CHANGE UP (ref 7.32–7.43)
PLATELET # BLD AUTO: 199 K/UL — SIGNIFICANT CHANGE UP (ref 150–400)
PMV BLD: 11.4 FL — SIGNIFICANT CHANGE UP (ref 7–13)
PO2 BLDV: 48 MMHG — HIGH (ref 35–40)
POTASSIUM BLDV-SCNC: 4.3 MMOL/L — SIGNIFICANT CHANGE UP (ref 3.4–4.5)
POTASSIUM SERPL-MCNC: 4.7 MMOL/L — SIGNIFICANT CHANGE UP (ref 3.5–5.3)
POTASSIUM SERPL-SCNC: 4.7 MMOL/L — SIGNIFICANT CHANGE UP (ref 3.5–5.3)
PROT SERPL-MCNC: 6.7 G/DL — SIGNIFICANT CHANGE UP (ref 6–8.3)
PROTHROM AB SERPL-ACNC: 17.9 SEC — HIGH (ref 9.8–13.1)
RBC # BLD: 2.94 M/UL — LOW (ref 4.2–5.8)
RBC # FLD: 14.5 % — SIGNIFICANT CHANGE UP (ref 10.3–14.5)
SAO2 % BLDV: 73.2 % — SIGNIFICANT CHANGE UP (ref 60–85)
SODIUM SERPL-SCNC: 131 MMOL/L — LOW (ref 135–145)
TROPONIN T, HIGH SENSITIVITY RESULT: 237 NG/L — CRITICAL HIGH (ref ?–14)
TROPONIN T, HIGH SENSITIVITY RESULT: 258 NG/L — CRITICAL HIGH (ref ?–14)
WBC # BLD: 14.29 K/UL — HIGH (ref 3.8–10.5)
WBC # FLD AUTO: 14.29 K/UL — HIGH (ref 3.8–10.5)

## 2018-06-19 PROCEDURE — 99223 1ST HOSP IP/OBS HIGH 75: CPT

## 2018-06-19 PROCEDURE — 71045 X-RAY EXAM CHEST 1 VIEW: CPT | Mod: 26

## 2018-06-19 PROCEDURE — 99222 1ST HOSP IP/OBS MODERATE 55: CPT

## 2018-06-19 PROCEDURE — 93010 ELECTROCARDIOGRAM REPORT: CPT

## 2018-06-19 RX ORDER — IPRATROPIUM/ALBUTEROL SULFATE 18-103MCG
3 AEROSOL WITH ADAPTER (GRAM) INHALATION
Qty: 0 | Refills: 0 | COMMUNITY

## 2018-06-19 RX ORDER — ASPIRIN/CALCIUM CARB/MAGNESIUM 324 MG
81 TABLET ORAL DAILY
Qty: 0 | Refills: 0 | Status: DISCONTINUED | OUTPATIENT
Start: 2018-06-19 | End: 2018-07-06

## 2018-06-19 RX ORDER — PANTOPRAZOLE SODIUM 20 MG/1
40 TABLET, DELAYED RELEASE ORAL
Qty: 0 | Refills: 0 | Status: DISCONTINUED | OUTPATIENT
Start: 2018-06-19 | End: 2018-07-06

## 2018-06-19 RX ORDER — FUROSEMIDE 40 MG
80 TABLET ORAL ONCE
Qty: 0 | Refills: 0 | Status: DISCONTINUED | OUTPATIENT
Start: 2018-06-19 | End: 2018-06-19

## 2018-06-19 RX ORDER — DEXTROSE 50 % IN WATER 50 %
15 SYRINGE (ML) INTRAVENOUS ONCE
Qty: 0 | Refills: 0 | Status: DISCONTINUED | OUTPATIENT
Start: 2018-06-19 | End: 2018-07-06

## 2018-06-19 RX ORDER — DEXTROSE 50 % IN WATER 50 %
12.5 SYRINGE (ML) INTRAVENOUS ONCE
Qty: 0 | Refills: 0 | Status: DISCONTINUED | OUTPATIENT
Start: 2018-06-19 | End: 2018-07-06

## 2018-06-19 RX ORDER — INSULIN LISPRO 100/ML
VIAL (ML) SUBCUTANEOUS
Qty: 0 | Refills: 0 | Status: DISCONTINUED | OUTPATIENT
Start: 2018-06-19 | End: 2018-07-06

## 2018-06-19 RX ORDER — FUROSEMIDE 40 MG
40 TABLET ORAL ONCE
Qty: 0 | Refills: 0 | Status: COMPLETED | OUTPATIENT
Start: 2018-06-19 | End: 2018-06-19

## 2018-06-19 RX ORDER — INSULIN LISPRO 100/ML
VIAL (ML) SUBCUTANEOUS AT BEDTIME
Qty: 0 | Refills: 0 | Status: DISCONTINUED | OUTPATIENT
Start: 2018-06-19 | End: 2018-07-06

## 2018-06-19 RX ORDER — ALBUTEROL 90 UG/1
2.5 AEROSOL, METERED ORAL ONCE
Qty: 0 | Refills: 0 | Status: COMPLETED | OUTPATIENT
Start: 2018-06-19 | End: 2018-06-19

## 2018-06-19 RX ORDER — ACETAMINOPHEN 500 MG
650 TABLET ORAL EVERY 6 HOURS
Qty: 0 | Refills: 0 | Status: DISCONTINUED | OUTPATIENT
Start: 2018-06-19 | End: 2018-07-06

## 2018-06-19 RX ORDER — AMIODARONE HYDROCHLORIDE 400 MG/1
200 TABLET ORAL DAILY
Qty: 0 | Refills: 0 | Status: DISCONTINUED | OUTPATIENT
Start: 2018-06-19 | End: 2018-07-06

## 2018-06-19 RX ORDER — WARFARIN SODIUM 2.5 MG/1
3.5 TABLET ORAL
Qty: 0 | Refills: 0 | COMMUNITY

## 2018-06-19 RX ORDER — GLUCAGON INJECTION, SOLUTION 0.5 MG/.1ML
1 INJECTION, SOLUTION SUBCUTANEOUS ONCE
Qty: 0 | Refills: 0 | Status: DISCONTINUED | OUTPATIENT
Start: 2018-06-19 | End: 2018-07-06

## 2018-06-19 RX ORDER — ATORVASTATIN CALCIUM 80 MG/1
40 TABLET, FILM COATED ORAL AT BEDTIME
Qty: 0 | Refills: 0 | Status: DISCONTINUED | OUTPATIENT
Start: 2018-06-19 | End: 2018-07-06

## 2018-06-19 RX ORDER — CLOPIDOGREL BISULFATE 75 MG/1
75 TABLET, FILM COATED ORAL DAILY
Qty: 0 | Refills: 0 | Status: DISCONTINUED | OUTPATIENT
Start: 2018-06-19 | End: 2018-07-06

## 2018-06-19 RX ORDER — DEXTROSE 50 % IN WATER 50 %
25 SYRINGE (ML) INTRAVENOUS ONCE
Qty: 0 | Refills: 0 | Status: DISCONTINUED | OUTPATIENT
Start: 2018-06-19 | End: 2018-07-06

## 2018-06-19 RX ORDER — TAMSULOSIN HYDROCHLORIDE 0.4 MG/1
0.4 CAPSULE ORAL AT BEDTIME
Qty: 0 | Refills: 0 | Status: DISCONTINUED | OUTPATIENT
Start: 2018-06-19 | End: 2018-07-06

## 2018-06-19 RX ORDER — METOPROLOL TARTRATE 50 MG
25 TABLET ORAL
Qty: 0 | Refills: 0 | Status: DISCONTINUED | OUTPATIENT
Start: 2018-06-19 | End: 2018-07-06

## 2018-06-19 RX ADMIN — Medication 81 MILLIGRAM(S): at 13:25

## 2018-06-19 RX ADMIN — Medication 40 MILLIGRAM(S): at 02:56

## 2018-06-19 RX ADMIN — Medication 1 DROP(S): at 13:24

## 2018-06-19 RX ADMIN — AMIODARONE HYDROCHLORIDE 200 MILLIGRAM(S): 400 TABLET ORAL at 20:33

## 2018-06-19 RX ADMIN — CLOPIDOGREL BISULFATE 75 MILLIGRAM(S): 75 TABLET, FILM COATED ORAL at 13:25

## 2018-06-19 RX ADMIN — Medication 1: at 13:25

## 2018-06-19 RX ADMIN — ALBUTEROL 2.5 MILLIGRAM(S): 90 AEROSOL, METERED ORAL at 01:38

## 2018-06-19 RX ADMIN — Medication 25 MILLIGRAM(S): at 20:33

## 2018-06-19 RX ADMIN — Medication 40 MILLIGRAM(S): at 01:38

## 2018-06-19 NOTE — CONSULT NOTE ADULT - SUBJECTIVE AND OBJECTIVE BOX
HPI:    71 y/o M with CAD, CHF with EF 20%, DM, HTN, HLD, AF on coumadin, CVA with R facial droop, ESRD on HD (MWF) COPD, bipolar disorder, dementia transferred from [facility] for respiratory distress.     Pt was unable to receive HD yesterday 2/2 dyspnea and hypoxia to high 80's on RA, and was sent to ED for further management.  Pt reports dyspnea without chest pain.  Denies nausea, cough, fever or chills.  No other complaints.        In the ED, pt was noted to have pulmonary edema on XR, and was given Lasix 80mg.  He reports he still feels dyspneic, but improved from prior after lasix.    At this time, the patient has remained afebrile. he is breathing comfortably on room air.          PAST MEDICAL & SURGICAL HISTORY:  CVA (cerebral vascular accident)  COPD (chronic obstructive pulmonary disease)  BPH (benign prostatic hyperplasia)  Bipolar affective disorder  CKD (chronic kidney disease)  Pancreatitis  Hypertension  Dyslipidemia  Diabetes mellitus  Coronary artery disease  HLD (hyperlipidemia)  Anemia  Acute renal failure  CKD (chronic kidney disease)  COPD (chronic obstructive pulmonary disease)  Fainting  Cardiac arrhythmia  Dizziness  Hydronephrosis  Cholecystitis  Bipolar 1 disorder  DM (diabetes mellitus)  HTN (hypertension)  Lumbar radiculopathy  Left heart failure  Reflux esophagitis  Coronary atherosclerosis  History of cardiac catheterization  No significant past surgical history      Allergies    No Known Allergies    Intolerances        ANTIMICROBIALS:      OTHER MEDS:  amiodarone    Tablet 200 milliGRAM(s) Oral daily  artificial  tears Solution 1 Drop(s) Both EYES four times a day  aspirin  chewable 81 milliGRAM(s) Oral daily  atorvastatin 40 milliGRAM(s) Oral at bedtime  busPIRone 5 milliGRAM(s) Oral two times a day  clopidogrel Tablet 75 milliGRAM(s) Oral daily  dextrose 40% Gel 15 Gram(s) Oral once PRN  dextrose 50% Injectable 12.5 Gram(s) IV Push once  dextrose 50% Injectable 25 Gram(s) IV Push once  dextrose 50% Injectable 25 Gram(s) IV Push once  glucagon  Injectable 1 milliGRAM(s) IntraMuscular once PRN  insulin lispro (HumaLOG) corrective regimen sliding scale   SubCutaneous three times a day before meals  insulin lispro (HumaLOG) corrective regimen sliding scale   SubCutaneous at bedtime  metoprolol tartrate 25 milliGRAM(s) Oral two times a day  multivitamin 1 Tablet(s) Oral daily  pantoprazole    Tablet 40 milliGRAM(s) Oral before breakfast  tamsulosin 0.4 milliGRAM(s) Oral at bedtime      SOCIAL HISTORY:  tx from [facility]  no recent travel      FAMILY HISTORY:  No pertinent family history in first degree relatives      REVIEW OF SYSTEMS  [  ] ROS unobtainable because:    [  ] All other systems negative except as noted below:	    Constitutional:  [ ] fever [ ] chills  [ ] weight loss  [ x] weakness  Skin:  [ ] rash [ ] phlebitis	  Eyes: [ ] icterus [ ] pain  [ ] discharge	  ENMT: [ ] sore throat  [ ] thrush [ ] ulcers [ ] exudates  Respiratory: [x ] dyspnea [ ] hemoptysis [ ] cough [ ] sputum	  Cardiovascular:  [ ] chest pain [ ] palpitations [ ] edema	  Gastrointestinal:  [ ] nausea [ ] vomiting [ ] diarrhea [ ] constipation [ ] pain	  Genitourinary:  [ ] dysuria [ ] frequency [ ] hematuria [ ] discharge [ ] flank pain  [ ] incontinence  Musculoskeletal:  [ ] myalgias [ ] arthralgias [ ] arthritis  [ ] back pain  Neurological:  [ ] headache [ ] seizures  [ ] confusion/altered mental status  Psychiatric:  [ ] anxiety [ ] depression	  Hematology/Lymphatics:  [ ] lymphadenopathy  Endocrine:  [ ] adrenal [ ] thyroid  Allergic/Immunologic:	 [ ] transplant [ ] seasonal    PHYSICAL EXAM:  General: [ x] non-toxic  HEAD/EYES: [ ] PERRL [x ] white sclera [ ] icterus  ENT:  [ ] normal [x ] supple [ ] thrush [ ] pharyngeal exudate  Cardiovascular:   [ ] murmur [x ] normal [ ] PPM/AICD  Respiratory:  [x ] clear to ausculation bilaterally  GI:  [ x] soft, non-tender, normal bowel sounds  :  [ ] james [ ] no CVA tenderness   Musculoskeletal:  [ x] no synovitis  Neurologic:  [x ] non-focal exam   Skin:  [x ] no rash  Lymph: [ ] no lymphadenopathy  Psychiatric:  [ ] appropriate affect [ ] alert & oriented  Lines:  [ x] no phlebitis [ ] central line          Drug Dosing Weight  Height (cm): 170.18 (08 Feb 2018 05:15)  Weight (kg): 62.5 (08 Feb 2018 05:15)  BMI (kg/m2): 21.6 (08 Feb 2018 05:15)  BSA (m2): 1.73 (08 Feb 2018 05:15)    Vital Signs Last 24 Hrs  T(F): 98.5 (06-19-18 @ 13:29), Max: 98.8 (06-18-18 @ 23:56)    Vital Signs Last 24 Hrs  HR: 78 (06-19-18 @ 13:29) (73 - 82)  BP: 148/70 (06-19-18 @ 13:29) (111/48 - 148/70)  RR: 22 (06-19-18 @ 13:29)  SpO2: 99% (06-19-18 @ 13:29) (99% - 100%)  Wt(kg): --                          8.0    14.29 )-----------( 199      ( 19 Jun 2018 01:11 )             25.7       06-19    131<L>  |  89<L>  |  85<H>  ----------------------------<  388<H>  4.7   |  22  |  7.78<H>    Ca    8.6      19 Jun 2018 01:11    TPro  6.7  /  Alb  3.1<L>  /  TBili  0.3  /  DBili  x   /  AST  20  /  ALT  31  /  AlkPhos  91  06-19          MICROBIOLOGY:    RADIOLOGY:  < from: Xray Chest 1 View- PORTABLE-Urgent (06.19.18 @ 01:26) >    Impression:    Mild pulmonary edema.    < end of copied text >

## 2018-06-19 NOTE — H&P ADULT - PROBLEM SELECTOR PLAN 2
Likely demand ischemia.  HS troponin increased from 237 to 268  - will consult cardiology given significant cardiac history and recent NSTEMI requiring 5 stents in September and support with Impella Likely demand ischemia.  HS troponin increased from 237 to 258  - will consult cardiology given significant cardiac history and recent NSTEMI requiring 5 stents in September and support with Ilda

## 2018-06-19 NOTE — CONSULT NOTE ADULT - SUBJECTIVE AND OBJECTIVE BOX
REASON FOR ADMISSION: Patient is a 72y old  Male who presents with a chief complaint of Dyspnea (19 Jun 2018 06:32)    HISTORY OF PRESENT ILLNESS: 73 y/o M with CAD s/p 12-14 stents placed in 1990's, and BABAR x 5 placed in Sept 2017, CHF with EF 20%, DM, HTN, HLD, AF on coumadin, CVA with R facial droop, ESRD on HD (MWF) COPD, bipolar disorder, dementia transferred from [facility] for respiratory distress and admitted with acute resp failure likely due to pulm edema as seen on CXR.  Pt was unable to receive HD yesterday due to dyspnea and hypoxia to high 80's on RA, . He was sent to ED for further management.      Patient seen and evaluated in the ED; looks comfortable and not in distress; saturating ok on RA. A dose of lasix 80 mg IC once was given last night and noted a james was placed. Patient feels better, improved breathing but still with some difficulty. No CP or other complaints.     Dialysis consent form signed and in the chart. Patient is coherent, comprehensible but unable to legibly sign or write his name. He agrees to dialysis; witnessed by the nurse at bedside.     REVIEW OF SYSTEMS: 12 review of systems negative except what is stated in HPI    PAST MEDICAL & SURGICAL HISTORY:  CVA (cerebral vascular accident)  COPD (chronic obstructive pulmonary disease)  BPH (benign prostatic hyperplasia)  Bipolar affective disorder  CKD (chronic kidney disease)  Pancreatitis  Hypertension  Dyslipidemia  Diabetes mellitus  Coronary artery disease  HLD (hyperlipidemia)  Anemia  Acute renal failure  CKD (chronic kidney disease)  COPD (chronic obstructive pulmonary disease)  Fainting  Cardiac arrhythmia  Dizziness  Hydronephrosis  Cholecystitis  Bipolar 1 disorder  DM (diabetes mellitus)  HTN (hypertension)  Lumbar radiculopathy  Left heart failure  Reflux esophagitis  Coronary atherosclerosis  History of cardiac catheterization  No significant past surgical history      SOCIAL HISTORY: no smoking, drinking or drugs    FAMILY HISTORY: FAMILY HISTORY:  No pertinent family history in first degree relatives      ALLERGIES: Allergies    No Known Allergies    Intolerances        Home Medications:  acetaminophen 325 mg oral tablet: 2 tab(s) orally every 6 hours, As needed, For Temp greater than 38 C (100.4 F) (19 Jun 2018 06:49)  amiodarone 200 mg oral tablet: 1 tab(s) orally once a day (19 Jun 2018 06:49)  aspirin 81 mg oral tablet, chewable: 1 tab(s) orally once a day (19 Jun 2018 06:49)  atorvastatin 40 mg oral tablet: 1 tab(s) orally once a day (at bedtime) (19 Jun 2018 06:49)  Breo Ellipta 100 mcg-25 mcg/inh inhalation powder: 1 puff(s) inhaled once a day, home (19 Jun 2018 06:49)  busPIRone 5 mg oral tablet: 1 tab(s) orally 2 times a day (19 Jun 2018 06:49)  clopidogrel 75 mg oral tablet: 1 tab(s) orally once a day (19 Jun 2018 06:49)  Coumadin 2 mg oral tablet: 1 tab(s) orally once a day (19 Jun 2018 06:49)  metoprolol tartrate 25 mg oral tablet: 1 tab(s) orally 2 times a day (19 Jun 2018 06:49)  Nephro-Rachel oral tablet: 1 tab(s) orally once a day (19 Jun 2018 06:49)  ocular lubricant ophthalmic solution: 1 drop(s) to each affected eye 4 times a day (19 Jun 2018 06:49)  pantoprazole 40 mg oral granule, enteric coated: 1 dose(s) orally once a day (19 Jun 2018 06:49)  tamsulosin 0.4 mg oral capsule: 1 cap(s) orally once a day (at bedtime) (19 Jun 2018 06:49)  Vitamin D3 50,000 intl units oral capsule: 1 cap(s) orally once a month (19 Jun 2018 06:49)      MEDICATIONS  (STANDING):  amiodarone    Tablet 200 milliGRAM(s) Oral daily  artificial  tears Solution 1 Drop(s) Both EYES four times a day  aspirin  chewable 81 milliGRAM(s) Oral daily  atorvastatin 40 milliGRAM(s) Oral at bedtime  busPIRone 5 milliGRAM(s) Oral two times a day  clopidogrel Tablet 75 milliGRAM(s) Oral daily  dextrose 50% Injectable 12.5 Gram(s) IV Push once  dextrose 50% Injectable 25 Gram(s) IV Push once  dextrose 50% Injectable 25 Gram(s) IV Push once  insulin lispro (HumaLOG) corrective regimen sliding scale   SubCutaneous three times a day before meals  insulin lispro (HumaLOG) corrective regimen sliding scale   SubCutaneous at bedtime  metoprolol tartrate 25 milliGRAM(s) Oral two times a day  multivitamin 1 Tablet(s) Oral daily  pantoprazole    Tablet 40 milliGRAM(s) Oral before breakfast  tamsulosin 0.4 milliGRAM(s) Oral at bedtime    MEDICATIONS  (PRN):  dextrose 40% Gel 15 Gram(s) Oral once PRN Blood Glucose LESS THAN 70 milliGRAM(s)/deciliter  glucagon  Injectable 1 milliGRAM(s) IntraMuscular once PRN Glucose LESS THAN 70 milligrams/deciliter      PHYSICAL EXAMINATION  VITAL SIGNS:  Vital Signs Last 24 Hrs  T(C): 36.5 (19 Jun 2018 06:49), Max: 37.1 (18 Jun 2018 23:56)  T(F): 97.7 (19 Jun 2018 06:49), Max: 98.8 (18 Jun 2018 23:56)  HR: 76 (19 Jun 2018 08:38) (73 - 82)  BP: 127/54 (19 Jun 2018 08:38) (111/48 - 128/50)  BP(mean): --  RR: 21 (19 Jun 2018 08:38) (18 - 24)  SpO2: 100% (19 Jun 2018 08:38) (99% - 100%)  I&O's Summary    18 Jun 2018 07:01  -  19 Jun 2018 07:00  --------------------------------------------------------  IN: 0 mL / OUT: 100 mL / NET: -100 mL      GA: awake and alert, no distress  EYES: EOMI, clear conj, anicteric sclera  ENT: MMM, clear OP, neck supple  LUNGS: faint rales, decreased breath sounds on the bases, normal effort  HEART; RS1S2 normal, no peripheral edema  ABD; Soft, NT/ND/BS+, no peritoneal signs  EXT; well perfused, no edema or cyanosis  ; + james  NEURO: awake and alert, not celaborating much but answering appropriately, moves all extremities  SKIN: warm and dry, no rash on visible skin    LABORATORY DATA:                        8.0    14.29 )-----------( 199      ( 19 Jun 2018 01:11 )             25.7     06-19    131<L>  |  89<L>  |  85<H>  ----------------------------<  388<H>  4.7   |  22  |  7.78<H>    Ca    8.6      19 Jun 2018 01:11    TPro  6.7  /  Alb  3.1<L>  /  TBili  0.3  /  DBili  x   /  AST  20  /  ALT  31  /  AlkPhos  91  06-19    LIVER FUNCTIONS - ( 19 Jun 2018 01:11 )  Alb: 3.1 g/dL / Pro: 6.7 g/dL / ALK PHOS: 91 u/L / ALT: 31 u/L / AST: 20 u/L / GGT: x             IMAGING: Reviewed and as per records: pertinent positives:       ASSESSMENT: This is a 73 y/o M with CAD s/p 12-14 stents placed in 1990's, and BABAR x 5 placed in Sept 2017, CHF with EF 20%, DM, HTN, HLD, AF on coumadin, CVA with R facial droop, ESRD on HD (MWF) COPD, bipolar disorder, [facility] and its complications.     1. Acute resp failure and pulm edema on CXR; no significant peripheral edema otherwise. Patient with significant cardiac history.   2. ESRD on HD MWF at [facility] rehab; missed HD yesterday due to SOB  3. Mild hyponatremia likely due to fluid overload  4. Anemia likely of chronic disease/ESRD  5. Mild Hypoalbuminemia    RECOMMENDATIONS:  - dialyze today x 3 hrs on a 2 K+ bath, up to 2 liters of UF as tolerated considering soft BP   - continue with MWF schedule will plan for next HD tomorrow  - limit fluid intake to less than 1.2 liters a day  - BMP daily while inpatient, check phos, mag and PTH  - check anemia panel  - renally dose all medications for a GFR< 15 mL/min/1.73 square meters   Rest of management per primary team    Thank you for allowing me to participate on this patient's care. Please do not hesitate to call with questions.    I will follow with you.    Maryanne Leon MD   [facility]  [phone] Consulted to see this patient this morning; it seems patient is followed by Dr. Jamie Adams as outpatient - confirmed with dialysis unit. Noted Patient was seen by Dr. Rao today. Will differ treatment to Dr. Jalen Leon MD   [facility]  [phone]

## 2018-06-19 NOTE — CONSULT NOTE ADULT - PROBLEM SELECTOR RECOMMENDATION 3
Hb noted to be low. Not on epogen as outpatient due to history of CVA?? Will hold off on CABRERA therapy for now. Check ferritin, TIBC, and serum iron levels. Monitor Hb.

## 2018-06-19 NOTE — ED ADULT NURSE NOTE - CHIEF COMPLAINT QUOTE
Pt from [facility], sent for shortness of breath.  Per EMS, pt had low SpO2 all day (Monday) and was due for dialysis, but did not get dialyzed due to low SpO2.  EMS states pt was 88% on room air upon arrival, placed on non-rebreather.  Per paperwork, pt is DNR/DNI,

## 2018-06-19 NOTE — H&P ADULT - HISTORY OF PRESENT ILLNESS
73 y/o M with CAD s/p 12-14 stents placed in 1990's, and BABAR x 5 placed in Sept 2017, CHF with EF 20%, DM, HTN, HLD, AF on coumadin, CVA with R facial droop, ESRD on HD (MWF) COPD, bipolar disorder, dementia transferred from [facility] for respiratory distress.  Pt was unable to receive HD yesterday 2/2 dyspnea and hypoxia to high 80's on RA, and was sent to ED for further management.  Pt reports dyspnea without chest pain.  Denies nausea, cough, fever or chills.  No other complaints.        In the ED, pt was noted to have pulmonary edema on XR, and was given Lasix 80mg.  He reports he still feels dyspneic, but improved from prior.

## 2018-06-19 NOTE — PROGRESS NOTE ADULT - SUBJECTIVE AND OBJECTIVE BOX
pt was evaluated by hospitalist earlier during the day   labs, vitals consults reviewed   discussed management plan with pt

## 2018-06-19 NOTE — ED ADULT NURSE REASSESSMENT NOTE - NS ED NURSE REASSESS COMMENT FT1
pt. resting in bed, daughter remains at the bedside, pt. denies pain/discomfort at the present time.  Respirations even and unlabored.  Vital signs stable. Pt. admitted to medicine, awaiting bed assignment.
pt. sleeping comfortably in bed, evaluated by admitting team, denies CP/SOB at the present time, respirations even and unlabored.  Vital signs stable. Pt. continues to await bed assignment.
received report on pt from GAYATRI Escobar. pt awake, alert, not verbally answering questions, but able to follow commands. skin warm, dry, appropriate for race. respirations even unlabored. no respiratory distress noted. no n/v/d. pt upper extremities noted to be contracted. ivl site clean, dry, intact. cardiac monitor in place in nsr. right av fistula noted. james catheter in place patently draining 100ml's dark yellow urine. safety maintained. awaiting bed assignment.

## 2018-06-19 NOTE — H&P ADULT - ASSESSMENT
71 y/o M with PMH as above including CAD with numerous stents, and ESRD on HD p/w pulmonary edema with respiratory distress, and noted to have elevated troponin level.

## 2018-06-19 NOTE — H&P ADULT - NSHPLABSRESULTS_GEN_ALL_CORE
06-19    131<L>  |  89<L>  |  85<H>  ----------------------------<  388<H>  4.7   |  22  |  7.78<H>    Ca    8.6      19 Jun 2018 01:11    TPro  6.7  /  Alb  3.1<L>  /  TBili  0.3  /  DBili  x   /  AST  20  /  ALT  31  /  AlkPhos  91  06-19                            8.0    14.29 )-----------( 199      ( 19 Jun 2018 01:11 )             25.7             PT/INR - ( 19 Jun 2018 01:11 )   PT: 17.9 SEC;   INR: 1.60          PTT - ( 19 Jun 2018 01:11 )  PTT:34.6 SEC    HS troponin: 237->268

## 2018-06-19 NOTE — H&P ADULT - NSHPREVIEWOFSYSTEMS_GEN_ALL_CORE
REVIEW OF SYSTEMS    General:  Negative  Skin/Breast:  Negative  Ophthalmologic:  Negative  ENMT:  Negative  Respiratory and Thorax:  Dyspnea  Cardiovascular:  Negative  Gastrointestinal:  Negative  Genitourinary:  Negative  Musculoskeletal:  Negative  Neurological:  Negative  Psychiatric:  Negative  Hematology/Lymphatics:  Negative	  Endocrine:	  Negative  Allergic/Immunologic:	 Negative

## 2018-06-19 NOTE — H&P ADULT - NSHPPHYSICALEXAM_GEN_ALL_CORE
Vital Signs Last 24 Hrs  T(C): 36.5 (19 Jun 2018 06:49), Max: 37.1 (18 Jun 2018 23:56)  T(F): 97.7 (19 Jun 2018 06:49), Max: 98.8 (18 Jun 2018 23:56)  HR: 74 (19 Jun 2018 06:49) (73 - 82)  BP: 111/48 (19 Jun 2018 06:49) (111/48 - 128/50)  BP(mean): --  RR: 20 (19 Jun 2018 06:49) (18 - 24)  SpO2: 100% (19 Jun 2018 06:49) (99% - 100%)    PHYSICAL EXAM:  GENERAL: No Acute Distress  EYES: conjunctiva and sclera clear  ENMT: Moist mucous membranes   NECK: Supple  CHEST/LUNG: Bilateral rales  HEART: Regular rate and rhythm; 2/6 systolic murmur  ABDOMEN: Soft, Nontender, Nondistended; Bowel sounds normal  EXTREMITIES:   No clubbing, cyanosis, or pedal edema  PSYCH: Normal Affect, Normal Behavior  SKIN: No rashes or lesions  MUSCULOSKELETAL: No joint swelling

## 2018-06-19 NOTE — CONSULT NOTE ADULT - PROBLEM SELECTOR RECOMMENDATION 9
Patient with h/o ESRD on HD; MWF schedule. Patient was unable to receive HD treatment yesterday. Labs reviewed from today. Will arrange for HD today. Monitor BMP and BP.

## 2018-06-19 NOTE — ED PROVIDER NOTE - MEDICAL DECISION MAKING DETAILS
Dyspnea after missed dialysis. CXR r/o pulmonary edema. Check electrolytes, EKG r/o hyperkalemia. Assess for emergency dialysis. Dyspnea after missed dialysis. CXR r/o pulmonary edema. Check electrolytes, EKG r/o hyperkalemia. [place] of Lasix as pt makes some urine. Assess for emergency dialysis.

## 2018-06-19 NOTE — ED PROVIDER NOTE - OBJECTIVE STATEMENT
72M Que [facility] patient BIBEMS for hypoxia down to 88% RA for several hours after not getting dialysis tonight due to technical issue. PMH of CVA, Cardiac arrest, asthma (never smoker) anemia, 72M Que [facility] patient BIBEMS for hypoxia down to 88% RA for several hours after not getting dialysis tonight due to technical issue. PMH of CVA residual right hemiplegia, Cardiac arrest, asthma (never smoker) anemia, CKD on HD MWF. History provided by son and daughter at bedside. Pt speaks Algerian only. Pt was in normal state of health until he missed dialysis today. No cough, no fever, no CP.    Per Que papers patient is DNR/DNI, however, per conversation with family DNR ONLY, OK with intubation. 72M Que [facility] patient BIBEMS for hypoxia down to 88% RA for several hours after not getting dialysis tonight due to technical issue. PMH of CVA residual right hemiplegia, Cardiac arrest, asthma (never smoker) anemia, CKD on HD MWF. History provided by son and daughter at bedside. Pt speaks Marshallese only. Pt was in normal state of health until he missed dialysis today. No cough, no fever, no CP. Pt c/o urinary retention, so Landa catheter placed PTA with minor urine output, does not normally make much urine.    Per Que papers patient is DNR/DNI, however, per conversation with daughter (Anisa Lisa) DNR ONLY, OK with intubation.

## 2018-06-19 NOTE — H&P ADULT - PROBLEM SELECTOR PLAN 6
- INR subtherapeutic  - continue metoprolol and amiodarone - INR subtherapeutic  - Will d/w cardiology re starting heparin for possible NSTEMI vs continuing coumadin  - continue metoprolol and amiodarone

## 2018-06-19 NOTE — ED ADULT NURSE NOTE - OBJECTIVE STATEMENT
Pt received in #25, lethargic and minimally responsive to tactile stimuli. Pt's daughter at bedside who states that the pt is from [facility]. Pt was scheduled to have HD today "but they said something was wrong with their machine and no one was able to get dialysis", "Then they put in a james catheter when he doesn't even make urine", "they later called me and said he was suffocating and that they were sending him here ([facility])". Pt on CM in SR, IV established, labs sent.

## 2018-06-19 NOTE — CONSULT NOTE ADULT - SUBJECTIVE AND OBJECTIVE BOX
Reid Martinez MD  Interventional Cardiology / Advance Heart Failure and Cardiac Transplant Specialist  [place] Office : [address] [place] [address] [place] [address]  Tel:   [place] Office : [address] [facility] [place] [address]  Tel: [phone]  Cell : [phone]    HISTORY OF PRESENT ILLNESS:  71 y/o M with CAD s/p 12-14 stents placed in 1990's, and BABAR x 5 placed in Sept 2017, CHF with EF 20%, DM, HTN, HLD, AF on coumadin, CVA with R facial droop, ESRD on HD (MWF) COPD, bipolar disorder, dementia transferred from [facility] for respiratory distress.  Pt was unable to receive HD yesterday 2/2 dyspnea and hypoxia to high 80's on RA, and was sent to ED for further management.  Pt reports dyspnea without chest pain.  Denies nausea, cough, fever or chills.  No other complaints.     Pt is a demented so good history not available    In the ED, pt was noted to have pulmonary edema on XR, and was given Lasix 80mg.  He reports he still feels dyspneic, but improved from prior.    PAST MEDICAL & SURGICAL HISTORY:  CVA (cerebral vascular accident)  COPD (chronic obstructive pulmonary disease)  BPH (benign prostatic hyperplasia)  Bipolar affective disorder  CKD (chronic kidney disease)  Pancreatitis  Hypertension  Dyslipidemia  Diabetes mellitus  Coronary artery disease  HLD (hyperlipidemia)  Anemia  Acute renal failure  CKD (chronic kidney disease)  COPD (chronic obstructive pulmonary disease)  Fainting  Cardiac arrhythmia  Dizziness  Hydronephrosis  Cholecystitis  Bipolar 1 disorder  DM (diabetes mellitus)  HTN (hypertension)  Lumbar radiculopathy  Left heart failure  Reflux esophagitis  Coronary atherosclerosis  History of cardiac catheterization  No significant past surgical history    	    MEDICATIONS:  amiodarone    Tablet 200 milliGRAM(s) Oral daily  aspirin  chewable 81 milliGRAM(s) Oral daily  clopidogrel Tablet 75 milliGRAM(s) Oral daily  metoprolol tartrate 25 milliGRAM(s) Oral two times a day  tamsulosin 0.4 milliGRAM(s) Oral at bedtime  busPIRone 5 milliGRAM(s) Oral two times a day  pantoprazole    Tablet 40 milliGRAM(s) Oral before breakfast  atorvastatin 40 milliGRAM(s) Oral at bedtime  dextrose 40% Gel 15 Gram(s) Oral once PRN  dextrose 50% Injectable 12.5 Gram(s) IV Push once  dextrose 50% Injectable 25 Gram(s) IV Push once  dextrose 50% Injectable 25 Gram(s) IV Push once  glucagon  Injectable 1 milliGRAM(s) IntraMuscular once PRN  insulin lispro (HumaLOG) corrective regimen sliding scale   SubCutaneous three times a day before meals  insulin lispro (HumaLOG) corrective regimen sliding scale   SubCutaneous at bedtime    artificial  tears Solution 1 Drop(s) Both EYES four times a day  multivitamin 1 Tablet(s) Oral daily      FAMILY HISTORY:  No pertinent family history in first degree relatives        Allergies    No Known Allergies    Intolerances    	      PHYSICAL EXAM:  T(C): 37.1 (06-19-18 @ 19:55), Max: 37.4 (06-19-18 @ 16:55)  HR: 88 (06-19-18 @ 19:55) (73 - 88)  BP: 116/51 (06-19-18 @ 19:55) (111/48 - 148/70)  RR: 16 (06-19-18 @ 19:55) (16 - 24)  SpO2: 99% (06-19-18 @ 13:29) (99% - 100%)  Wt(kg): --  I&O's Summary    18 Jun 2018 07:01  -  19 Jun 2018 07:00  --------------------------------------------------------  IN: 0 mL / OUT: 100 mL / NET: -100 mL    19 Jun 2018 07:01  -  19 Jun 2018 21:13  --------------------------------------------------------  IN: 500 mL / OUT: 1428 mL / NET: -928 mL        Appearance: Normal	  HEENT:   Normal oral mucosa, PERRL, EOMI	  Cardiovascular: Normal S1 S2, No JVD, No murmurs, No edema  Respiratory: b/l crackles	  Gastrointestinal:  Soft, Non-tender, + BS	  Extremities: no edema    LABS:	 	    CARDIAC MARKERS:                                  8.0    14.29 )-----------( 199      ( 19 Jun 2018 01:11 )             25.7     06-19    131<L>  |  89<L>  |  85<H>  ----------------------------<  388<H>  4.7   |  22  |  7.78<H>    Ca    8.6      19 Jun 2018 01:11    TPro  6.7  /  Alb  3.1<L>  /  TBili  0.3  /  DBili  x   /  AST  20  /  ALT  31  /  AlkPhos  91  06-19    proBNP:   Lipid Profile:   HgA1c:   TSH:     ASSESSMENT/PLAN:

## 2018-06-20 DIAGNOSIS — R50.9 FEVER, UNSPECIFIED: ICD-10-CM

## 2018-06-20 LAB
ALBUMIN SERPL ELPH-MCNC: 3.2 G/DL — LOW (ref 3.3–5)
ALP SERPL-CCNC: 90 U/L — SIGNIFICANT CHANGE UP (ref 40–120)
ALT FLD-CCNC: 30 U/L — SIGNIFICANT CHANGE UP (ref 4–41)
AMYLASE P1 CFR SERPL: 58 U/L — SIGNIFICANT CHANGE UP (ref 25–125)
AST SERPL-CCNC: 26 U/L — SIGNIFICANT CHANGE UP (ref 4–40)
BASOPHILS # BLD AUTO: 0.03 K/UL — SIGNIFICANT CHANGE UP (ref 0–0.2)
BASOPHILS NFR BLD AUTO: 0.3 % — SIGNIFICANT CHANGE UP (ref 0–2)
BILIRUB SERPL-MCNC: 0.5 MG/DL — SIGNIFICANT CHANGE UP (ref 0.2–1.2)
BUN SERPL-MCNC: 35 MG/DL — HIGH (ref 7–23)
BUN SERPL-MCNC: 39 MG/DL — HIGH (ref 7–23)
CALCIUM SERPL-MCNC: 9 MG/DL — SIGNIFICANT CHANGE UP (ref 8.4–10.5)
CALCIUM SERPL-MCNC: 9.2 MG/DL — SIGNIFICANT CHANGE UP (ref 8.4–10.5)
CHLORIDE SERPL-SCNC: 90 MMOL/L — LOW (ref 98–107)
CHLORIDE SERPL-SCNC: 94 MMOL/L — LOW (ref 98–107)
CO2 SERPL-SCNC: 25 MMOL/L — SIGNIFICANT CHANGE UP (ref 22–31)
CO2 SERPL-SCNC: 25 MMOL/L — SIGNIFICANT CHANGE UP (ref 22–31)
CREAT SERPL-MCNC: 4.12 MG/DL — HIGH (ref 0.5–1.3)
CREAT SERPL-MCNC: 4.72 MG/DL — HIGH (ref 0.5–1.3)
EOSINOPHIL # BLD AUTO: 0.03 K/UL — SIGNIFICANT CHANGE UP (ref 0–0.5)
EOSINOPHIL NFR BLD AUTO: 0.3 % — SIGNIFICANT CHANGE UP (ref 0–6)
FERRITIN SERPL-MCNC: 2578 NG/ML — HIGH (ref 30–400)
FOLATE SERPL-MCNC: > 20 NG/ML — HIGH (ref 4.7–20)
GLUCOSE BLDC GLUCOMTR-MCNC: 165 MG/DL — HIGH (ref 70–99)
GLUCOSE BLDC GLUCOMTR-MCNC: 203 MG/DL — HIGH (ref 70–99)
GLUCOSE BLDC GLUCOMTR-MCNC: 211 MG/DL — HIGH (ref 70–99)
GLUCOSE BLDC GLUCOMTR-MCNC: 238 MG/DL — HIGH (ref 70–99)
GLUCOSE SERPL-MCNC: 122 MG/DL — HIGH (ref 70–99)
GLUCOSE SERPL-MCNC: 177 MG/DL — HIGH (ref 70–99)
HBA1C BLD-MCNC: 6.6 % — HIGH (ref 4–5.6)
HBV CORE AB SER-ACNC: NONREACTIVE — SIGNIFICANT CHANGE UP
HBV SURFACE AB SER-ACNC: <3 MLU/ML — LOW
HCT VFR BLD CALC: 25.2 % — LOW (ref 39–50)
HCT VFR BLD CALC: 25.8 % — LOW (ref 39–50)
HCV AB S/CO SERPL IA: 0.35 S/CO — SIGNIFICANT CHANGE UP
HCV AB SERPL-IMP: SIGNIFICANT CHANGE UP
HGB BLD-MCNC: 8 G/DL — LOW (ref 13–17)
HGB BLD-MCNC: 8.1 G/DL — LOW (ref 13–17)
IMM GRANULOCYTES # BLD AUTO: 0.04 # — SIGNIFICANT CHANGE UP
IMM GRANULOCYTES NFR BLD AUTO: 0.4 % — SIGNIFICANT CHANGE UP (ref 0–1.5)
IRON SATN MFR SERPL: 157 UG/DL — SIGNIFICANT CHANGE UP (ref 155–535)
IRON SATN MFR SERPL: 41 UG/DL — LOW (ref 45–165)
LACTATE SERPL-SCNC: 1.2 MMOL/L — SIGNIFICANT CHANGE UP (ref 0.5–2)
LIDOCAIN IGE QN: 35.1 U/L — SIGNIFICANT CHANGE UP (ref 7–60)
LYMPHOCYTES # BLD AUTO: 1.26 K/UL — SIGNIFICANT CHANGE UP (ref 1–3.3)
LYMPHOCYTES # BLD AUTO: 13.4 % — SIGNIFICANT CHANGE UP (ref 13–44)
MAGNESIUM SERPL-MCNC: 2.1 MG/DL — SIGNIFICANT CHANGE UP (ref 1.6–2.6)
MAGNESIUM SERPL-MCNC: 2.2 MG/DL — SIGNIFICANT CHANGE UP (ref 1.6–2.6)
MCHC RBC-ENTMCNC: 27.5 PG — SIGNIFICANT CHANGE UP (ref 27–34)
MCHC RBC-ENTMCNC: 27.6 PG — SIGNIFICANT CHANGE UP (ref 27–34)
MCHC RBC-ENTMCNC: 31.4 % — LOW (ref 32–36)
MCHC RBC-ENTMCNC: 31.7 % — LOW (ref 32–36)
MCV RBC AUTO: 86.9 FL — SIGNIFICANT CHANGE UP (ref 80–100)
MCV RBC AUTO: 87.5 FL — SIGNIFICANT CHANGE UP (ref 80–100)
METHOD TYPE: SIGNIFICANT CHANGE UP
MONOCYTES # BLD AUTO: 0.73 K/UL — SIGNIFICANT CHANGE UP (ref 0–0.9)
MONOCYTES NFR BLD AUTO: 7.8 % — SIGNIFICANT CHANGE UP (ref 2–14)
NEUTROPHILS # BLD AUTO: 7.32 K/UL — SIGNIFICANT CHANGE UP (ref 1.8–7.4)
NEUTROPHILS NFR BLD AUTO: 77.8 % — HIGH (ref 43–77)
NRBC # FLD: 0 — SIGNIFICANT CHANGE UP
NRBC # FLD: 0 — SIGNIFICANT CHANGE UP
ORGANISM # SPEC MICROSCOPIC CNT: SIGNIFICANT CHANGE UP
ORGANISM # SPEC MICROSCOPIC CNT: SIGNIFICANT CHANGE UP
PHOSPHATE SERPL-MCNC: 2.4 MG/DL — LOW (ref 2.5–4.5)
PLATELET # BLD AUTO: 217 K/UL — SIGNIFICANT CHANGE UP (ref 150–400)
PLATELET # BLD AUTO: 218 K/UL — SIGNIFICANT CHANGE UP (ref 150–400)
PMV BLD: 10.8 FL — SIGNIFICANT CHANGE UP (ref 7–13)
PMV BLD: 11.1 FL — SIGNIFICANT CHANGE UP (ref 7–13)
POTASSIUM SERPL-MCNC: 3.6 MMOL/L — SIGNIFICANT CHANGE UP (ref 3.5–5.3)
POTASSIUM SERPL-MCNC: 3.9 MMOL/L — SIGNIFICANT CHANGE UP (ref 3.5–5.3)
POTASSIUM SERPL-SCNC: 3.6 MMOL/L — SIGNIFICANT CHANGE UP (ref 3.5–5.3)
POTASSIUM SERPL-SCNC: 3.9 MMOL/L — SIGNIFICANT CHANGE UP (ref 3.5–5.3)
PROT SERPL-MCNC: 7.1 G/DL — SIGNIFICANT CHANGE UP (ref 6–8.3)
PTH-INTACT SERPL-MCNC: 76.54 PG/ML — HIGH (ref 15–65)
RBC # BLD: 2.9 M/UL — LOW (ref 4.2–5.8)
RBC # BLD: 2.95 M/UL — LOW (ref 4.2–5.8)
RBC # FLD: 14.5 % — SIGNIFICANT CHANGE UP (ref 10.3–14.5)
RBC # FLD: 14.6 % — HIGH (ref 10.3–14.5)
SODIUM SERPL-SCNC: 135 MMOL/L — SIGNIFICANT CHANGE UP (ref 135–145)
SODIUM SERPL-SCNC: 138 MMOL/L — SIGNIFICANT CHANGE UP (ref 135–145)
SPECIMEN SOURCE: SIGNIFICANT CHANGE UP
TRANSFERRIN SERPL-MCNC: 132 MG/DL — LOW (ref 200–360)
UIBC SERPL-MCNC: 115.5 UG/DL — SIGNIFICANT CHANGE UP (ref 110–370)
VIT B12 SERPL-MCNC: 747 PG/ML — SIGNIFICANT CHANGE UP (ref 200–900)
WBC # BLD: 10.38 K/UL — SIGNIFICANT CHANGE UP (ref 3.8–10.5)
WBC # BLD: 9.41 K/UL — SIGNIFICANT CHANGE UP (ref 3.8–10.5)
WBC # FLD AUTO: 10.38 K/UL — SIGNIFICANT CHANGE UP (ref 3.8–10.5)
WBC # FLD AUTO: 9.41 K/UL — SIGNIFICANT CHANGE UP (ref 3.8–10.5)

## 2018-06-20 PROCEDURE — 99233 SBSQ HOSP IP/OBS HIGH 50: CPT | Mod: GC

## 2018-06-20 PROCEDURE — 71045 X-RAY EXAM CHEST 1 VIEW: CPT | Mod: 26

## 2018-06-20 PROCEDURE — 99232 SBSQ HOSP IP/OBS MODERATE 35: CPT

## 2018-06-20 RX ORDER — PIPERACILLIN AND TAZOBACTAM 4; .5 G/20ML; G/20ML
3.38 INJECTION, POWDER, LYOPHILIZED, FOR SOLUTION INTRAVENOUS EVERY 12 HOURS
Qty: 0 | Refills: 0 | Status: DISCONTINUED | OUTPATIENT
Start: 2018-06-20 | End: 2018-06-22

## 2018-06-20 RX ORDER — PIPERACILLIN AND TAZOBACTAM 4; .5 G/20ML; G/20ML
3.38 INJECTION, POWDER, LYOPHILIZED, FOR SOLUTION INTRAVENOUS ONCE
Qty: 0 | Refills: 0 | Status: COMPLETED | OUTPATIENT
Start: 2018-06-20 | End: 2018-06-20

## 2018-06-20 RX ORDER — VANCOMYCIN HCL 1 G
1000 VIAL (EA) INTRAVENOUS ONCE
Qty: 0 | Refills: 0 | Status: COMPLETED | OUTPATIENT
Start: 2018-06-20 | End: 2018-06-20

## 2018-06-20 RX ADMIN — Medication 250 MILLIGRAM(S): at 01:07

## 2018-06-20 RX ADMIN — Medication 5 MILLIGRAM(S): at 06:38

## 2018-06-20 RX ADMIN — Medication 81 MILLIGRAM(S): at 18:34

## 2018-06-20 RX ADMIN — Medication 2: at 18:18

## 2018-06-20 RX ADMIN — PIPERACILLIN AND TAZOBACTAM 25 GRAM(S): 4; .5 INJECTION, POWDER, LYOPHILIZED, FOR SOLUTION INTRAVENOUS at 17:11

## 2018-06-20 RX ADMIN — Medication 1: at 13:02

## 2018-06-20 RX ADMIN — Medication 650 MILLIGRAM(S): at 00:47

## 2018-06-20 RX ADMIN — ATORVASTATIN CALCIUM 40 MILLIGRAM(S): 80 TABLET, FILM COATED ORAL at 21:54

## 2018-06-20 RX ADMIN — Medication 2: at 10:05

## 2018-06-20 RX ADMIN — TAMSULOSIN HYDROCHLORIDE 0.4 MILLIGRAM(S): 0.4 CAPSULE ORAL at 21:54

## 2018-06-20 RX ADMIN — Medication 1 DROP(S): at 06:38

## 2018-06-20 RX ADMIN — Medication 1 DROP(S): at 17:03

## 2018-06-20 RX ADMIN — Medication 1 TABLET(S): at 18:35

## 2018-06-20 RX ADMIN — PIPERACILLIN AND TAZOBACTAM 200 GRAM(S): 4; .5 INJECTION, POWDER, LYOPHILIZED, FOR SOLUTION INTRAVENOUS at 02:37

## 2018-06-20 RX ADMIN — CLOPIDOGREL BISULFATE 75 MILLIGRAM(S): 75 TABLET, FILM COATED ORAL at 18:34

## 2018-06-20 RX ADMIN — PANTOPRAZOLE SODIUM 40 MILLIGRAM(S): 20 TABLET, DELAYED RELEASE ORAL at 06:37

## 2018-06-20 RX ADMIN — Medication 1 DROP(S): at 01:08

## 2018-06-20 RX ADMIN — Medication 25 MILLIGRAM(S): at 06:38

## 2018-06-20 RX ADMIN — PIPERACILLIN AND TAZOBACTAM 25 GRAM(S): 4; .5 INJECTION, POWDER, LYOPHILIZED, FOR SOLUTION INTRAVENOUS at 06:46

## 2018-06-20 RX ADMIN — AMIODARONE HYDROCHLORIDE 200 MILLIGRAM(S): 400 TABLET ORAL at 06:37

## 2018-06-20 RX ADMIN — Medication 5 MILLIGRAM(S): at 18:35

## 2018-06-20 RX ADMIN — Medication 1 DROP(S): at 23:50

## 2018-06-20 NOTE — PROGRESS NOTE ADULT - ASSESSMENT
Echo - 9/17 - EF severely decreased    a/p     1) Acute on chronic systolic CHF - pt on HD, SOB improving mild troponin elevation likely sec to ESRD and CHF    2) CAD s/p PCI - on ASA and plavix     3) ESRD on HD    4) S/P code sepsis - UTI vs. PNA, ID following, on iv abx, pending blood cx, ua/urine cx

## 2018-06-20 NOTE — PROGRESS NOTE ADULT - ASSESSMENT
72 year old with esrd and CHF presents with shortness of breath and hypoxia.   He improved after diuresis with furosemide.  Had leukocytosis, then overnight acute onset of fevers  Appears at ? baseline this AM with no clear source infection  Initially with SOB, also has chronic james with poor output  ? PNA vs UTI in setting chronic james vs occult  Overall, fever, leukocytosis, SOB  - Continue Zosyn 3.375g q 12 for present time  - Repeat BCXs, F/U pending BCX  - Check UA/UCX  - Consider CT Chest if recurrent episodes fevers  - Bladder scan to ensure functional james  - Monitor for focal signs infection    Claude Nolasco MD  Pager [phone]  After 5pm and on weekends call [phone]

## 2018-06-20 NOTE — PROGRESS NOTE ADULT - SUBJECTIVE AND OBJECTIVE BOX
chief complaint : dyspnea        SUBJECTIVE / OVERNIGHT EVENTS: pt appears lethargic having fevers , agitated , removing clothes, unable to obtain ros from pt - pt not cooperative      MEDICATIONS  (STANDING):  amiodarone    Tablet 200 milliGRAM(s) Oral daily  artificial  tears Solution 1 Drop(s) Both EYES four times a day  aspirin  chewable 81 milliGRAM(s) Oral daily  atorvastatin 40 milliGRAM(s) Oral at bedtime  busPIRone 5 milliGRAM(s) Oral two times a day  clopidogrel Tablet 75 milliGRAM(s) Oral daily  dextrose 50% Injectable 12.5 Gram(s) IV Push once  dextrose 50% Injectable 25 Gram(s) IV Push once  dextrose 50% Injectable 25 Gram(s) IV Push once  insulin lispro (HumaLOG) corrective regimen sliding scale   SubCutaneous three times a day before meals  insulin lispro (HumaLOG) corrective regimen sliding scale   SubCutaneous at bedtime  metoprolol tartrate 25 milliGRAM(s) Oral two times a day  multivitamin 1 Tablet(s) Oral daily  pantoprazole    Tablet 40 milliGRAM(s) Oral before breakfast  piperacillin/tazobactam IVPB. 3.375 Gram(s) IV Intermittent every 12 hours  tamsulosin 0.4 milliGRAM(s) Oral at bedtime    MEDICATIONS  (PRN):  acetaminophen  Suppository 650 milliGRAM(s) Rectal every 6 hours PRN For Temp greater than 38 C (100.4 F)  dextrose 40% Gel 15 Gram(s) Oral once PRN Blood Glucose LESS THAN 70 milliGRAM(s)/deciliter  glucagon  Injectable 1 milliGRAM(s) IntraMuscular once PRN Glucose LESS THAN 70 milligrams/deciliter      Vital Signs Last 24 Hrs  T(C): 36.7 (20 Jun 2018 21:51), Max: 39.3 (19 Jun 2018 23:34)  T(F): 98.1 (20 Jun 2018 21:51), Max: 102.8 (19 Jun 2018 23:34)  HR: 77 (20 Jun 2018 21:51) (77 - 104)  BP: 103/64 (20 Jun 2018 21:51) (93/35 - 124/55)  BP(mean): --  RR: 18 (20 Jun 2018 21:51) (18 - 18)  SpO2: 100% (20 Jun 2018 21:51) (94% - 100%)  CAPILLARY BLOOD GLUCOSE      POCT Blood Glucose.: 211 mg/dL (20 Jun 2018 17:48)  POCT Blood Glucose.: 165 mg/dL (20 Jun 2018 13:02)  POCT Blood Glucose.: 238 mg/dL (20 Jun 2018 09:31)    I&O's Summary    19 Jun 2018 07:01  -  20 Jun 2018 07:00  --------------------------------------------------------  IN: 500 mL / OUT: 1628 mL / NET: -1128 mL    unable to obtain ros from pt sec to pts cognitive status    PHYSICAL EXAM:    NECK: Supple, No JVD  CHEST/LUNG: dec breath sounds at bases   HEART:  S1 , S2 +  ABDOMEN: sof,t bs+  EXTREMITIES:  no edema  NEUROLOGY: pt lethargic, intermittently agitated   james+    LABS:  06-20    135  |  90<L>  |  39<H>  ----------------------------<  177<H>  3.6   |  25  |  4.72<H>    Ca    9.2      20 Jun 2018 06:00  Phos  2.4     06-20  Mg     2.2     06-20    TPro  7.1  /  Alb  3.2<L>  /  TBili  0.5  /  DBili      /  AST  26  /  ALT  30  /  AlkPhos  90  06-20    Creatinine Trend: 4.72 <--, 4.12 <--, 7.78 <--                        8.1    10.38 )-----------( 218      ( 20 Jun 2018 06:00 )             25.8     Urine Studies:            LIVER FUNCTIONS - ( 20 Jun 2018 00:20 )  Alb: 3.2 g/dL / Pro: 7.1 g/dL / ALK PHOS: 90 u/L / ALT: 30 u/L / AST: 26 u/L / GGT: x           PT/INR - ( 19 Jun 2018 01:11 )   PT: 17.9 SEC;   INR: 1.60          PTT - ( 19 Jun 2018 01:11 )  PTT:34.6 SEC

## 2018-06-20 NOTE — PROGRESS NOTE ADULT - SUBJECTIVE AND OBJECTIVE BOX
CARDIOLOGY FOLLOW UP - Dr. Patterson  coverage for Dr. Soria     CC events noted, code sepsis called, ID following. VS improving       PHYSICAL EXAM:  T(C): 38.1 (06-20-18 @ 06:31), Max: 39.3 (06-19-18 @ 23:34)  HR: 91 (06-20-18 @ 06:31) (78 - 104)  BP: 105/60 (06-20-18 @ 06:31) (93/35 - 148/70)  RR: 18 (06-20-18 @ 06:31) (16 - 22)  SpO2: 97% (06-20-18 @ 06:31) (94% - 99%)  Wt(kg): --  I&O's Summary    19 Jun 2018 07:01  -  20 Jun 2018 07:00  --------------------------------------------------------  IN: 500 mL / OUT: 1628 mL / NET: -1128 mL        Appearance: Normal	  Cardiovascular: Normal S1 S2,RRR, No JVD, No murmurs  Respiratory: crackles at bases 	  Gastrointestinal:  Soft, Non-tender, + BS	  Extremities: Normal range of motion, No clubbing, cyanosis or edema        MEDICATIONS  (STANDING):  amiodarone    Tablet 200 milliGRAM(s) Oral daily  artificial  tears Solution 1 Drop(s) Both EYES four times a day  aspirin  chewable 81 milliGRAM(s) Oral daily  atorvastatin 40 milliGRAM(s) Oral at bedtime  busPIRone 5 milliGRAM(s) Oral two times a day  clopidogrel Tablet 75 milliGRAM(s) Oral daily  dextrose 50% Injectable 12.5 Gram(s) IV Push once  dextrose 50% Injectable 25 Gram(s) IV Push once  dextrose 50% Injectable 25 Gram(s) IV Push once  insulin lispro (HumaLOG) corrective regimen sliding scale   SubCutaneous three times a day before meals  insulin lispro (HumaLOG) corrective regimen sliding scale   SubCutaneous at bedtime  metoprolol tartrate 25 milliGRAM(s) Oral two times a day  multivitamin 1 Tablet(s) Oral daily  pantoprazole    Tablet 40 milliGRAM(s) Oral before breakfast  piperacillin/tazobactam IVPB. 3.375 Gram(s) IV Intermittent every 12 hours  tamsulosin 0.4 milliGRAM(s) Oral at bedtime      TELEMETRY: SR 1st degree av block	    ECG:  	  RADIOLOGY:   DIAGNOSTIC TESTING:  [ ] Echocardiogram:  [ ]  Catheterization:  [ ] Stress Test:    OTHER: 	    LABS:	 	                                8.1    10.38 )-----------( 218      ( 20 Jun 2018 06:00 )             25.8     06-20    135  |  90<L>  |  39<H>  ----------------------------<  177<H>  3.6   |  25  |  4.72<H>    Ca    9.2      20 Jun 2018 06:00  Phos  2.4     06-20  Mg     2.2     06-20    TPro  7.1  /  Alb  3.2<L>  /  TBili  0.5  /  DBili  x   /  AST  26  /  ALT  30  /  AlkPhos  90  06-20    PT/INR - ( 19 Jun 2018 01:11 )   PT: 17.9 SEC;   INR: 1.60          PTT - ( 19 Jun 2018 01:11 )  PTT:34.6 SEC

## 2018-06-20 NOTE — CHART NOTE - NSCHARTNOTEFT_GEN_A_CORE
d/w Dr. Salcido - will discontinue james   d/w ID - re: Positive blood cultures. Vanco trough in am 6/21 - will continue vancomycin and dose according to level

## 2018-06-20 NOTE — CHART NOTE - NSCHARTNOTEFT_GEN_A_CORE
Called by nurse for temp of 103, BP 93/35      Pt is c/o fever denies nausea, vomiting    73 y/o M with CAD s/p 12-14 stents placed in 1990's, and BABAR x 5 placed in Sept 2017, CHF with EF 20%, DM, HTN, HLD, AF on coumadin, CVA , ESRD on HD (MWF) COPD, bipolar disorder, dementia admitted for SOB secondary to fluid overload. Called by nurse for temp of 103, BP 93/35      Pt is asymptomatic, he denies headache, neck pain, blurred vision, cough, chest pain, abdominal pain, dysuria, back pain.   Vital Signs Last 24 Hrs  T(C): 38 (20 Jun 2018 02:54), Max: 39.3 (19 Jun 2018 23:34)  T(F): 100.4 (20 Jun 2018 02:54), Max: 102.8 (19 Jun 2018 23:34)  HR: 100 (20 Jun 2018 02:54) (74 - 104)  BP: 100/60 (20 Jun 2018 02:54) (93/35 - 148/70)  RR: 18 (20 Jun 2018 02:54) (16 - 22)  SpO2: 94% (20 Jun 2018 02:54) (94% - 100%)    CBC, CMP, amylase, lipase, lactate level, blood Cx, UA, Ucx and CXR ordered    71 y/o M with CAD s/p 12-14 stents placed in 1990's, and BABAR x 5 placed in Sept 2017, CHF with EF 20%, DM, HTN, HLD, AF on coumadin, CVA , ESRD on HD (MWF) COPD, bipolar disorder, dementia admitted for SOB secondary to fluid overload improved s/p HD.     Tylenol 650 mg given   Vanco 1 gram X 1 ordered  Zosyn Ordered  Will FU CXR Result  Will FU with ID in AM   Case discussed with Dr. Salcido, agreed with above

## 2018-06-20 NOTE — PROGRESS NOTE ADULT - ASSESSMENT
73 y/o M with CAD s/p 12-14 stents placed in 1990's, and BABAR x 5 placed in Sept 2017, CHF with EF 20%, DM, HTN, HLD, AF on coumadin, CVA , ESRD on HD (MWF) COPD, bipolar disorder, dementia admitted for SOB secondary to fluid overload.

## 2018-06-20 NOTE — PROGRESS NOTE ADULT - PROBLEM SELECTOR PLAN 1
Patient with h/o ESRD on HD; MWF schedule. Last HD was done yesterday on 6/19 but was unable to complete due to HD. Labs reviewed from today. No plan for HD today. Monitor BMP and BP.

## 2018-06-20 NOTE — PROGRESS NOTE ADULT - SUBJECTIVE AND OBJECTIVE BOX
CC: F/U Fever    Saw/spoke to patient. Patient had episode of fevers overnight. Patient now on abx. Today, patient able to verbalize but not able to accurately provide history. Per RN, no new localizing. Has had poor output from james, no diarrhea noted. No other events.    Allergies  No Known Allergies    ANTIMICROBIALS:  piperacillin/tazobactam IVPB. 3.375 every 12 hours    PE:    Vital Signs Last 24 Hrs  T(C): 38.1 (20 Jun 2018 06:31), Max: 39.3 (19 Jun 2018 23:34)  T(F): 100.6 (20 Jun 2018 06:31), Max: 102.8 (19 Jun 2018 23:34)  HR: 91 (20 Jun 2018 06:31) (78 - 104)  BP: 105/60 (20 Jun 2018 06:31) (93/35 - 148/70)  RR: 18 (20 Jun 2018 06:31) (16 - 22)  SpO2: 97% (20 Jun 2018 06:31) (94% - 99%)    Gen: AOx1, NAD, non-toxic  CV: S1+S2 normal, no murmurs, nontachycardic  Resp: Clear bilat, no resp distress, no crackles/wheezes  Abd: Soft, nontender, +BS  Ext: No LE edema, no wounds  : James, no pain to palpation bladder    LABS:                        8.1    10.38 )-----------( 218      ( 20 Jun 2018 06:00 )             25.8     06-20    135  |  90<L>  |  39<H>  ----------------------------<  177<H>  3.6   |  25  |  4.72<H>    Ca    9.2      20 Jun 2018 06:00  Phos  2.4     06-20  Mg     2.2     06-20    TPro  7.1  /  Alb  3.2<L>  /  TBili  0.5  /  DBili  x   /  AST  26  /  ALT  30  /  AlkPhos  90  06-20    MICROBIOLOGY:    BCX pending    RADIOLOGY:    6/20 CXR:    FINDINGS:    Lines/Tubes: None    Heart and mediastinum:  Unchanged.    Lungs, pleura, and airways: Mild interstitial edema is unchanged    Bones and soft tissues: The bones and soft tissues are unchanged.    Impression:    Mild interstitial edema, unchanged

## 2018-06-20 NOTE — PROGRESS NOTE ADULT - SUBJECTIVE AND OBJECTIVE BOX
[facility] Division of Kidney Diseases & Hypertension  FOLLOW UP NOTE  [phone]--------------------------------------------------------------------------------     HPI: 73 y/o M with CAD s/p 12-14 stents placed in 1990's, and BABAR x 5 placed in Sept 2017, CHF with EF 20%, DM, HTN, HLD, AF on coumadin, CVA , ESRD on HD (MWF) COPD, bipolar disorder, dementia admitted for SOB. Patient had HD yesterday but unable to complete full treatment due to tachycardia. Patient seen and examined today; appears more lethargic than yesterday.     PAST HISTORY  --------------------------------------------------------------------------------  No significant changes to PMH, PSH, FHx, SHx, unless otherwise noted    ALLERGIES & MEDICATIONS  --------------------------------------------------------------------------------  Allergies    No Known Allergies    Intolerances      Standing Inpatient Medications  amiodarone    Tablet 200 milliGRAM(s) Oral daily  artificial  tears Solution 1 Drop(s) Both EYES four times a day  aspirin  chewable 81 milliGRAM(s) Oral daily  atorvastatin 40 milliGRAM(s) Oral at bedtime  busPIRone 5 milliGRAM(s) Oral two times a day  clopidogrel Tablet 75 milliGRAM(s) Oral daily  dextrose 50% Injectable 12.5 Gram(s) IV Push once  dextrose 50% Injectable 25 Gram(s) IV Push once  dextrose 50% Injectable 25 Gram(s) IV Push once  insulin lispro (HumaLOG) corrective regimen sliding scale   SubCutaneous three times a day before meals  insulin lispro (HumaLOG) corrective regimen sliding scale   SubCutaneous at bedtime  metoprolol tartrate 25 milliGRAM(s) Oral two times a day  multivitamin 1 Tablet(s) Oral daily  pantoprazole    Tablet 40 milliGRAM(s) Oral before breakfast  piperacillin/tazobactam IVPB. 3.375 Gram(s) IV Intermittent every 12 hours  tamsulosin 0.4 milliGRAM(s) Oral at bedtime    PRN Inpatient Medications  acetaminophen  Suppository 650 milliGRAM(s) Rectal every 6 hours PRN  dextrose 40% Gel 15 Gram(s) Oral once PRN  glucagon  Injectable 1 milliGRAM(s) IntraMuscular once PRN      REVIEW OF SYSTEMS  --------------------------------------------------------------------------------  Unable to obtain.    VITALS/PHYSICAL EXAM  --------------------------------------------------------------------------------  T(C): 38.1 (06-20-18 @ 06:31), Max: 39.3 (06-19-18 @ 23:34)  HR: 91 (06-20-18 @ 06:31) (78 - 104)  BP: 105/60 (06-20-18 @ 06:31) (93/35 - 148/70)  RR: 18 (06-20-18 @ 06:31) (16 - 22)  SpO2: 97% (06-20-18 @ 06:31) (94% - 99%)  Wt(kg): --        06-19-18 @ 07:01  -  06-20-18 @ 07:00  --------------------------------------------------------  IN: 500 mL / OUT: 1628 mL / NET: -1128 mL      Physical Exam:  	  Gen: Elderly male, NAD  	HEENT: anicteric  	Pulm: Rales present over both lungs.   	CV:  S1S2 rrr  	Abd: +BS, soft   	Ext: No B/L Lower ext edema  	Neuro: No focal deficits  	Skin: Warm, without rashes  	Vascular access: Right UE AVF present; thrill and bruit heard.       LABS/STUDIES  --------------------------------------------------------------------------------              8.1    10.38 >-----------<  218      [06-20-18 @ 06:00]              25.8     135  |  90  |  39  ----------------------------<  177      [06-20-18 @ 06:00]  3.6   |  25  |  4.72        Ca     9.2     [06-20-18 @ 06:00]      Mg     2.2     [06-20-18 @ 06:00]      Phos  2.4     [06-20-18 @ 06:00]    TPro  7.1  /  Alb  3.2  /  TBili  0.5  /  DBili  x   /  AST  26  /  ALT  30  /  AlkPhos  90  [06-20-18 @ 00:20]    PT/INR: PT 17.9 , INR 1.60       [06-19-18 @ 01:11]  PTT: 34.6       [06-19-18 @ 01:11]      Creatinine Trend:  SCr 4.72 [06-20 @ 06:00]  SCr 4.12 [06-20 @ 00:20]  SCr 7.78 [06-19 @ 01:11]        Iron 41, TIBC 157, %sat --      [06-20-18 @ 06:00]  Ferritin 2578      [06-20-18 @ 06:00]  PTH 76.54 (Ca --)      [06-20-18 @ 06:00]   --  HbA1c 6.6      [06-20-18 @ 06:00]    HBsAb <3.0      [06-19-18 @ 17:05]  HBsAg NEGATIVE      [06-19-18 @ 17:05]  HBcAb Nonreactive      [06-19-18 @ 17:05]  HCV 0.35, Nonreactive Hepatitis C AB  S/CO Ratio                        Interpretation  < 1.0                                     Non-Reactive  1.0 - 4.9                           Weakly-Reactive  > 5.0                                 Reactive  Non-Reactive: Aperson with a non-reactive HCV antibody  result is considered uninfected.  No further action is  needed unless recent infection is suspected.  In these  cases, consider repeat testing later to detect  seroconversion..  Weakly-Reactive: HCV antibody test is abnormal, HCV RNA  Qualitative test will follow.  Reactive: HCV antibody test is abnormal, HCV RNA  Qualitative test will follow.  Note: HCV antibody testing is performed on the Abbott   system.      [06-19-18 @ 17:05] [facility] Division of Kidney Diseases & Hypertension  FOLLOW UP NOTE  [phone]--------------------------------------------------------------------------------     HPI: 73 y/o M with CAD s/p 12-14 stents placed in 1990's, and BABAR x 5 placed in Sept 2017, CHF with EF 20%, DM, HTN, HLD, AF on coumadin, CVA , ESRD on HD (MWF) COPD, bipolar disorder, dementia admitted for SOB. Patient had HD yesterday but unable to complete full treatment due to tachycardia. Patient seen and examined today; appears more lethargic than yesterday.     PAST HISTORY  --------------------------------------------------------------------------------  No significant changes to PMH, PSH, FHx, SHx, unless otherwise noted    ALLERGIES & MEDICATIONS  --------------------------------------------------------------------------------  Allergies    No Known Allergies    Intolerances      Standing Inpatient Medications  amiodarone    Tablet 200 milliGRAM(s) Oral daily  artificial  tears Solution 1 Drop(s) Both EYES four times a day  aspirin  chewable 81 milliGRAM(s) Oral daily  atorvastatin 40 milliGRAM(s) Oral at bedtime  busPIRone 5 milliGRAM(s) Oral two times a day  clopidogrel Tablet 75 milliGRAM(s) Oral daily  dextrose 50% Injectable 12.5 Gram(s) IV Push once  dextrose 50% Injectable 25 Gram(s) IV Push once  dextrose 50% Injectable 25 Gram(s) IV Push once  insulin lispro (HumaLOG) corrective regimen sliding scale   SubCutaneous three times a day before meals  insulin lispro (HumaLOG) corrective regimen sliding scale   SubCutaneous at bedtime  metoprolol tartrate 25 milliGRAM(s) Oral two times a day  multivitamin 1 Tablet(s) Oral daily  pantoprazole    Tablet 40 milliGRAM(s) Oral before breakfast  piperacillin/tazobactam IVPB. 3.375 Gram(s) IV Intermittent every 12 hours  tamsulosin 0.4 milliGRAM(s) Oral at bedtime    PRN Inpatient Medications  acetaminophen  Suppository 650 milliGRAM(s) Rectal every 6 hours PRN  dextrose 40% Gel 15 Gram(s) Oral once PRN  glucagon  Injectable 1 milliGRAM(s) IntraMuscular once PRN      REVIEW OF SYSTEMS  --------------------------------------------------------------------------------  Unable to obtain.    VITALS/PHYSICAL EXAM  --------------------------------------------------------------------------------  T(C): 38.1 (06-20-18 @ 06:31), Max: 39.3 (06-19-18 @ 23:34)  HR: 91 (06-20-18 @ 06:31) (78 - 104)  BP: 105/60 (06-20-18 @ 06:31) (93/35 - 148/70)  RR: 18 (06-20-18 @ 06:31) (16 - 22)  SpO2: 97% (06-20-18 @ 06:31) (94% - 99%)  Wt(kg): --        06-19-18 @ 07:01  -  06-20-18 @ 07:00  --------------------------------------------------------  IN: 500 mL / OUT: 1628 mL / NET: -1128 mL      Physical Exam:  	  Gen: Elderly male, NAD  	HEENT: anicteric  	Pulm: Rales present over both lungs.   	CV:  S1S2 rrr  	Abd: +BS, soft   	Ext: No B/L Lower ext edema  	Neuro: minimally verbal, follows command  	Skin: Warm, without rashes  	Vascular access: Right UE AVF present; thrill and bruit heard.       LABS/STUDIES  --------------------------------------------------------------------------------              8.1    10.38 >-----------<  218      [06-20-18 @ 06:00]              25.8     135  |  90  |  39  ----------------------------<  177      [06-20-18 @ 06:00]  3.6   |  25  |  4.72        Ca     9.2     [06-20-18 @ 06:00]      Mg     2.2     [06-20-18 @ 06:00]      Phos  2.4     [06-20-18 @ 06:00]    TPro  7.1  /  Alb  3.2  /  TBili  0.5  /  DBili  x   /  AST  26  /  ALT  30  /  AlkPhos  90  [06-20-18 @ 00:20]    PT/INR: PT 17.9 , INR 1.60       [06-19-18 @ 01:11]  PTT: 34.6       [06-19-18 @ 01:11]      Creatinine Trend:  SCr 4.72 [06-20 @ 06:00]  SCr 4.12 [06-20 @ 00:20]  SCr 7.78 [06-19 @ 01:11]        Iron 41, TIBC 157, %sat --      [06-20-18 @ 06:00]  Ferritin 2578      [06-20-18 @ 06:00]  PTH 76.54 (Ca --)      [06-20-18 @ 06:00]   --  HbA1c 6.6      [06-20-18 @ 06:00]    HBsAb <3.0      [06-19-18 @ 17:05]  HBsAg NEGATIVE      [06-19-18 @ 17:05]  HBcAb Nonreactive      [06-19-18 @ 17:05]  HCV 0.35, Nonreactive Hepatitis C AB  S/CO Ratio                        Interpretation  < 1.0                                     Non-Reactive  1.0 - 4.9                           Weakly-Reactive  > 5.0                                 Reactive  Non-Reactive: Aperson with a non-reactive HCV antibody  result is considered uninfected.  No further action is  needed unless recent infection is suspected.  In these  cases, consider repeat testing later to detect  seroconversion..  Weakly-Reactive: HCV antibody test is abnormal, HCV RNA  Qualitative test will follow.  Reactive: HCV antibody test is abnormal, HCV RNA  Qualitative test will follow.  Note: HCV antibody testing is performed on the Abbott   system.      [06-19-18 @ 17:05]

## 2018-06-20 NOTE — PROGRESS NOTE ADULT - ASSESSMENT
73 y/o M with PMH as above including CAD with numerous stents, and ESRD on HD p/w pulmonary edema with respiratory distress, and noted to have elevated troponin level.

## 2018-06-21 LAB
APTT BLD: 37.4 SEC — SIGNIFICANT CHANGE UP (ref 27.5–37.4)
BASE EXCESS BLDA CALC-SCNC: 0.8 MMOL/L — SIGNIFICANT CHANGE UP
BLD GP AB SCN SERPL QL: NEGATIVE — SIGNIFICANT CHANGE UP
BUN SERPL-MCNC: 53 MG/DL — HIGH (ref 7–23)
BUN SERPL-MCNC: 55 MG/DL — HIGH (ref 7–23)
CA-I BLDA-SCNC: 1.16 MMOL/L — SIGNIFICANT CHANGE UP (ref 1.15–1.29)
CALCIUM SERPL-MCNC: 9 MG/DL — SIGNIFICANT CHANGE UP (ref 8.4–10.5)
CALCIUM SERPL-MCNC: 9 MG/DL — SIGNIFICANT CHANGE UP (ref 8.4–10.5)
CHLORIDE SERPL-SCNC: 89 MMOL/L — LOW (ref 98–107)
CHLORIDE SERPL-SCNC: 91 MMOL/L — LOW (ref 98–107)
CO2 SERPL-SCNC: 18 MMOL/L — LOW (ref 22–31)
CO2 SERPL-SCNC: 24 MMOL/L — SIGNIFICANT CHANGE UP (ref 22–31)
CREAT SERPL-MCNC: 6.08 MG/DL — HIGH (ref 0.5–1.3)
CREAT SERPL-MCNC: 6.67 MG/DL — HIGH (ref 0.5–1.3)
GLUCOSE BLDA-MCNC: 226 MG/DL — HIGH (ref 70–99)
GLUCOSE BLDC GLUCOMTR-MCNC: 102 MG/DL — HIGH (ref 70–99)
GLUCOSE BLDC GLUCOMTR-MCNC: 211 MG/DL — HIGH (ref 70–99)
GLUCOSE BLDC GLUCOMTR-MCNC: 215 MG/DL — HIGH (ref 70–99)
GLUCOSE BLDC GLUCOMTR-MCNC: 263 MG/DL — HIGH (ref 70–99)
GLUCOSE SERPL-MCNC: 202 MG/DL — HIGH (ref 70–99)
GLUCOSE SERPL-MCNC: 225 MG/DL — HIGH (ref 70–99)
HCO3 BLDA-SCNC: 25 MMOL/L — SIGNIFICANT CHANGE UP (ref 22–26)
HCT VFR BLD CALC: 24.3 % — LOW (ref 39–50)
HCT VFR BLD CALC: 24.5 % — LOW (ref 39–50)
HCT VFR BLD CALC: 28.9 % — LOW (ref 39–50)
HCT VFR BLDA CALC: 25.5 % — LOW (ref 39–51)
HGB BLD-MCNC: 7.6 G/DL — LOW (ref 13–17)
HGB BLD-MCNC: 7.8 G/DL — LOW (ref 13–17)
HGB BLD-MCNC: 8.7 G/DL — LOW (ref 13–17)
HGB BLDA-MCNC: 8.2 G/DL — LOW (ref 13–17)
INR BLD: 2.01 — HIGH (ref 0.88–1.17)
LACTATE BLDA-SCNC: 1.5 MMOL/L — SIGNIFICANT CHANGE UP (ref 0.5–2)
MAGNESIUM SERPL-MCNC: 2.3 MG/DL — SIGNIFICANT CHANGE UP (ref 1.6–2.6)
MAGNESIUM SERPL-MCNC: 2.3 MG/DL — SIGNIFICANT CHANGE UP (ref 1.6–2.6)
MCHC RBC-ENTMCNC: 27.6 PG — SIGNIFICANT CHANGE UP (ref 27–34)
MCHC RBC-ENTMCNC: 27.6 PG — SIGNIFICANT CHANGE UP (ref 27–34)
MCHC RBC-ENTMCNC: 28 PG — SIGNIFICANT CHANGE UP (ref 27–34)
MCHC RBC-ENTMCNC: 30.1 % — LOW (ref 32–36)
MCHC RBC-ENTMCNC: 31.3 % — LOW (ref 32–36)
MCHC RBC-ENTMCNC: 31.8 % — LOW (ref 32–36)
MCV RBC AUTO: 89.7 FL — SIGNIFICANT CHANGE UP (ref 80–100)
MCV RBC AUTO: 90.4 FL — SIGNIFICANT CHANGE UP (ref 80–100)
MCV RBC AUTO: 91.7 FL — SIGNIFICANT CHANGE UP (ref 80–100)
NRBC # FLD: 0 — SIGNIFICANT CHANGE UP
ORGANISM # SPEC MICROSCOPIC CNT: SIGNIFICANT CHANGE UP
PCO2 BLDA: 36 MMHG — SIGNIFICANT CHANGE UP (ref 35–48)
PH BLDA: 7.45 PH — SIGNIFICANT CHANGE UP (ref 7.35–7.45)
PLATELET # BLD AUTO: 172 K/UL — SIGNIFICANT CHANGE UP (ref 150–400)
PLATELET # BLD AUTO: 212 K/UL — SIGNIFICANT CHANGE UP (ref 150–400)
PLATELET # BLD AUTO: 221 K/UL — SIGNIFICANT CHANGE UP (ref 150–400)
PMV BLD: 10.7 FL — SIGNIFICANT CHANGE UP (ref 7–13)
PMV BLD: 10.8 FL — SIGNIFICANT CHANGE UP (ref 7–13)
PMV BLD: 11.9 FL — SIGNIFICANT CHANGE UP (ref 7–13)
PO2 BLDA: 129 MMHG — HIGH (ref 83–108)
POTASSIUM BLDA-SCNC: 3.9 MMOL/L — SIGNIFICANT CHANGE UP (ref 3.4–4.5)
POTASSIUM SERPL-MCNC: 4.1 MMOL/L — SIGNIFICANT CHANGE UP (ref 3.5–5.3)
POTASSIUM SERPL-MCNC: 4.8 MMOL/L — SIGNIFICANT CHANGE UP (ref 3.5–5.3)
POTASSIUM SERPL-SCNC: 4.1 MMOL/L — SIGNIFICANT CHANGE UP (ref 3.5–5.3)
POTASSIUM SERPL-SCNC: 4.8 MMOL/L — SIGNIFICANT CHANGE UP (ref 3.5–5.3)
PROTHROM AB SERPL-ACNC: 23.5 SEC — HIGH (ref 9.8–13.1)
RBC # BLD: 2.71 M/UL — LOW (ref 4.2–5.8)
RBC # BLD: 2.83 M/UL — LOW (ref 4.2–5.8)
RBC # BLD: 3.15 M/UL — LOW (ref 4.2–5.8)
RBC # FLD: 14.5 % — SIGNIFICANT CHANGE UP (ref 10.3–14.5)
RBC # FLD: 14.6 % — HIGH (ref 10.3–14.5)
RBC # FLD: 14.7 % — HIGH (ref 10.3–14.5)
RH IG SCN BLD-IMP: NEGATIVE — SIGNIFICANT CHANGE UP
SAO2 % BLDA: 96.9 % — SIGNIFICANT CHANGE UP (ref 95–99)
SODIUM BLDA-SCNC: 135 MMOL/L — LOW (ref 136–146)
SODIUM SERPL-SCNC: 133 MMOL/L — LOW (ref 135–145)
SODIUM SERPL-SCNC: 135 MMOL/L — SIGNIFICANT CHANGE UP (ref 135–145)
VANCOMYCIN FLD-MCNC: 9.8 UG/ML — SIGNIFICANT CHANGE UP
WBC # BLD: 7.43 K/UL — SIGNIFICANT CHANGE UP (ref 3.8–10.5)
WBC # BLD: 8.25 K/UL — SIGNIFICANT CHANGE UP (ref 3.8–10.5)
WBC # BLD: 8.87 K/UL — SIGNIFICANT CHANGE UP (ref 3.8–10.5)
WBC # FLD AUTO: 7.43 K/UL — SIGNIFICANT CHANGE UP (ref 3.8–10.5)
WBC # FLD AUTO: 8.25 K/UL — SIGNIFICANT CHANGE UP (ref 3.8–10.5)
WBC # FLD AUTO: 8.87 K/UL — SIGNIFICANT CHANGE UP (ref 3.8–10.5)

## 2018-06-21 PROCEDURE — 99232 SBSQ HOSP IP/OBS MODERATE 35: CPT

## 2018-06-21 PROCEDURE — 71045 X-RAY EXAM CHEST 1 VIEW: CPT | Mod: 26

## 2018-06-21 PROCEDURE — 99233 SBSQ HOSP IP/OBS HIGH 50: CPT | Mod: GC

## 2018-06-21 RX ORDER — VANCOMYCIN HCL 1 G
1000 VIAL (EA) INTRAVENOUS ONCE
Qty: 0 | Refills: 0 | Status: COMPLETED | OUTPATIENT
Start: 2018-06-21 | End: 2018-06-21

## 2018-06-21 RX ORDER — IPRATROPIUM/ALBUTEROL SULFATE 18-103MCG
3 AEROSOL WITH ADAPTER (GRAM) INHALATION EVERY 6 HOURS
Qty: 0 | Refills: 0 | Status: DISCONTINUED | OUTPATIENT
Start: 2018-06-21 | End: 2018-07-02

## 2018-06-21 RX ORDER — ACETAMINOPHEN 500 MG
650 TABLET ORAL ONCE
Qty: 0 | Refills: 0 | Status: COMPLETED | OUTPATIENT
Start: 2018-06-21 | End: 2018-06-21

## 2018-06-21 RX ADMIN — Medication 1 TABLET(S): at 09:33

## 2018-06-21 RX ADMIN — PIPERACILLIN AND TAZOBACTAM 25 GRAM(S): 4; .5 INJECTION, POWDER, LYOPHILIZED, FOR SOLUTION INTRAVENOUS at 05:32

## 2018-06-21 RX ADMIN — Medication 1 DROP(S): at 18:33

## 2018-06-21 RX ADMIN — Medication 650 MILLIGRAM(S): at 03:11

## 2018-06-21 RX ADMIN — Medication 5 MILLIGRAM(S): at 05:32

## 2018-06-21 RX ADMIN — Medication 3: at 13:40

## 2018-06-21 RX ADMIN — Medication 2: at 09:32

## 2018-06-21 RX ADMIN — Medication 1 DROP(S): at 11:27

## 2018-06-21 RX ADMIN — Medication 650 MILLIGRAM(S): at 04:00

## 2018-06-21 RX ADMIN — PANTOPRAZOLE SODIUM 40 MILLIGRAM(S): 20 TABLET, DELAYED RELEASE ORAL at 05:34

## 2018-06-21 RX ADMIN — Medication 1 DROP(S): at 05:35

## 2018-06-21 RX ADMIN — AMIODARONE HYDROCHLORIDE 200 MILLIGRAM(S): 400 TABLET ORAL at 05:35

## 2018-06-21 RX ADMIN — Medication 5 MILLIGRAM(S): at 18:47

## 2018-06-21 RX ADMIN — Medication 25 MILLIGRAM(S): at 18:47

## 2018-06-21 RX ADMIN — PIPERACILLIN AND TAZOBACTAM 25 GRAM(S): 4; .5 INJECTION, POWDER, LYOPHILIZED, FOR SOLUTION INTRAVENOUS at 18:34

## 2018-06-21 RX ADMIN — Medication 250 MILLIGRAM(S): at 11:31

## 2018-06-21 RX ADMIN — Medication 25 MILLIGRAM(S): at 05:32

## 2018-06-21 RX ADMIN — Medication 1 DROP(S): at 23:43

## 2018-06-21 RX ADMIN — CLOPIDOGREL BISULFATE 75 MILLIGRAM(S): 75 TABLET, FILM COATED ORAL at 09:33

## 2018-06-21 RX ADMIN — Medication 81 MILLIGRAM(S): at 09:33

## 2018-06-21 NOTE — PROGRESS NOTE ADULT - PROBLEM SELECTOR PLAN 2
Likely demand ischemia.  HS troponin increased from 237 to 258  - will consult cardiology given significant cardiac history and recent NSTEMI requiring 5 stents in September and support with Ilda

## 2018-06-21 NOTE — CONSULT NOTE ADULT - ATTENDING COMMENTS
Respiratory insufficiency likely related to fluid overload in setting of ESRD/HF and now bacteremia.  Patient is clinically improved after HD, no need for NIV. Would use bpap only as needed (fluid overloaded or for work of breathing).

## 2018-06-21 NOTE — PROGRESS NOTE ADULT - SUBJECTIVE AND OBJECTIVE BOX
Follow Up:      Inverval History/ROS:Patient is a 72y old  Male who presents with a chief complaint of Dyspnea (19 Jun 2018 06:32)  Afebrile. No events.        Allergies    No Known Allergies    Intolerances        ANTIMICROBIALS:  piperacillin/tazobactam IVPB. 3.375 every 12 hours  vancomycin  IVPB 1000 once      OTHER MEDS:  acetaminophen  Suppository 650 milliGRAM(s) Rectal every 6 hours PRN  amiodarone    Tablet 200 milliGRAM(s) Oral daily  artificial  tears Solution 1 Drop(s) Both EYES four times a day  aspirin  chewable 81 milliGRAM(s) Oral daily  atorvastatin 40 milliGRAM(s) Oral at bedtime  busPIRone 5 milliGRAM(s) Oral two times a day  clopidogrel Tablet 75 milliGRAM(s) Oral daily  dextrose 40% Gel 15 Gram(s) Oral once PRN  dextrose 50% Injectable 12.5 Gram(s) IV Push once  dextrose 50% Injectable 25 Gram(s) IV Push once  dextrose 50% Injectable 25 Gram(s) IV Push once  glucagon  Injectable 1 milliGRAM(s) IntraMuscular once PRN  insulin lispro (HumaLOG) corrective regimen sliding scale   SubCutaneous three times a day before meals  insulin lispro (HumaLOG) corrective regimen sliding scale   SubCutaneous at bedtime  metoprolol tartrate 25 milliGRAM(s) Oral two times a day  multivitamin 1 Tablet(s) Oral daily  pantoprazole    Tablet 40 milliGRAM(s) Oral before breakfast  tamsulosin 0.4 milliGRAM(s) Oral at bedtime      Vital Signs Last 24 Hrs  T(C): 36.4 (21 Jun 2018 05:29), Max: 37.9 (20 Jun 2018 14:53)  T(F): 97.5 (21 Jun 2018 05:29), Max: 100.2 (20 Jun 2018 14:53)  HR: 82 (21 Jun 2018 05:29) (77 - 85)  BP: 119/69 (21 Jun 2018 05:29) (103/64 - 119/69)  BP(mean): --  RR: 18 (21 Jun 2018 05:29) (18 - 18)  SpO2: 100% (21 Jun 2018 05:29) (96% - 100%)    PHYSICAL EXAM:  General: [x ] non-toxic  HEAD/EYES: [ ] PERRL [x ] white sclera [ ] icterus  ENT:  [ ] normal [x ] supple [ ] thrush [ ] pharyngeal exudate  Cardiovascular:   [ ] murmur [ ] normal [ ] PPM/AICD  Respiratory:  x[ ] clear to ausculation bilaterally  GI:  [x ] soft, non-tender, normal bowel sounds  :  [ ] james [ ] no CVA tenderness   Musculoskeletal:  [ ] no synovitis  Neurologic:  [ ] non-focal exam   Skin:  [ ] no rash  Lymph: [ ] no lymphadenopathy  Psychiatric:  [ x] appropriate affect [ ] alert & oriented  Lines:  [x ] no phlebitis [ ] central line                                8.7    8.87  )-----------( 172      ( 21 Jun 2018 06:45 )             28.9       06-21    133<L>  |  89<L>  |  53<H>  ----------------------------<  202<H>  4.8   |  18<L>  |  6.08<H>    Ca    9.0      21 Jun 2018 06:45  Phos  2.4     06-20  Mg     2.3     06-21    TPro  7.1  /  Alb  3.2<L>  /  TBili  0.5  /  DBili  x   /  AST  26  /  ALT  30  /  AlkPhos  90  06-20          MICROBIOLOGY:    RADIOLOGY:

## 2018-06-21 NOTE — PROGRESS NOTE ADULT - ASSESSMENT
72 year old with esrd and CHF presents with shortness of breath and hypoxia.   HD via AV fistula  He improved after diuresis with furosemide.  Had leukocytosis and fever.    No wwith enterococcal bacteremia    ?  focus with chronic james. Check UA/ culture  Check Echo     Consider CT a/p with contrast    Continue zosyn/ vanco based on daily level for now.

## 2018-06-21 NOTE — CHART NOTE - NSCHARTNOTEFT_GEN_A_CORE
Called by RN pt noted with tachypnea and use of abdominal muscle. afebrile. SBP 140s. sat 98 on NC. S1S2 no murmur no gallops (tachy), lung noted mild crackle bilaterally. + BS with pain on palpation. ABG obtained, lab work obtain. Pt placed on nonrebreather. Attending made aware and recommended to call pulmonary, nebs standing. ABG normal. CXR at bedside with pulmonary edema, pt require urgent HD (unable to get bedside due to sink malfunction), per Dr Omari gipson to be off floor for HD. Pulmonary recommended BiPAP temporary. Attending also spoke with family member regarding updates and family decided to make pt DNR/DNI. Daughter arrived at bedside DNR/DNI form signed. consent for CT A/P signed to rule out infectious etiology given bacteremia.     Writer received call [place]: [phone] ext [phone] requesting updates regarding pt since pt has a son that is currently imprison and wants to visit pt. discussed with attending who will give the Jese a call back (contact information given)

## 2018-06-21 NOTE — CONSULT NOTE ADULT - SUBJECTIVE AND OBJECTIVE BOX
CHIEF COMPLAINT: Sob    HPI:  71 y/o M    PAST MEDICAL & SURGICAL HISTORY:  CVA (cerebral vascular accident)  COPD (chronic obstructive pulmonary disease)  BPH (benign prostatic hyperplasia)  Bipolar affective disorder  CKD (chronic kidney disease)  Pancreatitis  Hypertension  Dyslipidemia  Diabetes mellitus  Coronary artery disease  HLD (hyperlipidemia)  Anemia  Acute renal failure  CKD (chronic kidney disease)  COPD (chronic obstructive pulmonary disease)  Fainting  Cardiac arrhythmia  Dizziness  Hydronephrosis  Cholecystitis  Bipolar 1 disorder  DM (diabetes mellitus)  HTN (hypertension)  Lumbar radiculopathy  Left heart failure  Reflux esophagitis  Coronary atherosclerosis  History of cardiac catheterization  No significant past surgical history      FAMILY HISTORY:  No pertinent family history in first degree relatives      SOCIAL HISTORY:  Smoking: __ packs x ___ years  EtOH Use:  Marital Status:  Occupation:  Recent Travel:  Country of Birth:  Advance Directives:    Allergies    No Known Allergies    Intolerances        HOME MEDICATIONS:    REVIEW OF SYSTEMS:  Constitutional:   Eyes:  ENT:  CV:  Resp:  GI:  :  MSK:  Integumentary:  Neurological:  Psychiatric:  Endocrine:  Hematologic/Lymphatic:  Allergic/Immunologic:  [ ] All other systems negative  [ ] Unable to assess ROS because ________    OBJECTIVE:  ICU Vital Signs Last 24 Hrs  T(C): 36.4 (21 Jun 2018 13:30), Max: 37.9 (20 Jun 2018 14:53)  T(F): 97.5 (21 Jun 2018 13:30), Max: 100.2 (20 Jun 2018 14:53)  HR: 85 (21 Jun 2018 13:30) (77 - 85)  BP: 145/78 (21 Jun 2018 13:30) (103/64 - 145/78)  BP(mean): --  ABP: --  ABP(mean): --  RR: 23 (21 Jun 2018 13:30) (18 - 23)  SpO2: 100% (21 Jun 2018 13:30) (100% - 100%)        CAPILLARY BLOOD GLUCOSE      POCT Blood Glucose.: 263 mg/dL (21 Jun 2018 12:38)      PHYSICAL EXAM:  General:   HEENT:   Lymph Nodes:  Neck:   Respiratory:   Cardiovascular:   Abdomen:   Extremities:   Skin:   Neurological:  Psychiatry:    HOSPITAL MEDICATIONS:  MEDICATIONS  (STANDING):  ALBUTerol/ipratropium for Nebulization 3 milliLiter(s) Nebulizer every 6 hours  amiodarone    Tablet 200 milliGRAM(s) Oral daily  artificial  tears Solution 1 Drop(s) Both EYES four times a day  aspirin  chewable 81 milliGRAM(s) Oral daily  atorvastatin 40 milliGRAM(s) Oral at bedtime  busPIRone 5 milliGRAM(s) Oral two times a day  clopidogrel Tablet 75 milliGRAM(s) Oral daily  dextrose 50% Injectable 12.5 Gram(s) IV Push once  dextrose 50% Injectable 25 Gram(s) IV Push once  dextrose 50% Injectable 25 Gram(s) IV Push once  insulin lispro (HumaLOG) corrective regimen sliding scale   SubCutaneous three times a day before meals  insulin lispro (HumaLOG) corrective regimen sliding scale   SubCutaneous at bedtime  metoprolol tartrate 25 milliGRAM(s) Oral two times a day  multivitamin 1 Tablet(s) Oral daily  pantoprazole    Tablet 40 milliGRAM(s) Oral before breakfast  piperacillin/tazobactam IVPB. 3.375 Gram(s) IV Intermittent every 12 hours  tamsulosin 0.4 milliGRAM(s) Oral at bedtime    MEDICATIONS  (PRN):  acetaminophen  Suppository 650 milliGRAM(s) Rectal every 6 hours PRN For Temp greater than 38 C (100.4 F)  dextrose 40% Gel 15 Gram(s) Oral once PRN Blood Glucose LESS THAN 70 milliGRAM(s)/deciliter  glucagon  Injectable 1 milliGRAM(s) IntraMuscular once PRN Glucose LESS THAN 70 milligrams/deciliter      LABS:                        8.7    8.87  )-----------( 172      ( 21 Jun 2018 06:45 )             28.9     06-21    133<L>  |  89<L>  |  53<H>  ----------------------------<  202<H>  4.8   |  18<L>  |  6.08<H>    Ca    9.0      21 Jun 2018 06:45  Phos  2.4     06-20  Mg     2.3     06-21    TPro  7.1  /  Alb  3.2<L>  /  TBili  0.5  /  DBili  x   /  AST  26  /  ALT  30  /  AlkPhos  90  06-20              MICROBIOLOGY:     RADIOLOGY:  [ ] Reviewed and interpreted by me    EKG: CHIEF COMPLAINT: Sob    HPI:  73 y/o M with CAD s/p 12-14 stents placed in 1990's, and BABAR x 5 placed in Sept 2017, CHF with EF 20%, DM, HTN, HLD, AF on coumadin, CVA with R facial droop, ESRD on HD (MWF) COPD, bipolar disorder, transferred from [facility] for respiratory distress.  Pt was unable to receive HD yesterday 2/2 dyspnea and hypoxia to high 80's on RA, and was sent to ED for further management.     Today, patient developed acute onset shorntess of breath/HRF at around 1pm.  Pulmonary consult for further management     PAST MEDICAL & SURGICAL HISTORY:  CVA (cerebral vascular accident)  COPD (chronic obstructive pulmonary disease)  BPH (benign prostatic hyperplasia)  Bipolar affective disorder  CKD (chronic kidney disease)  Pancreatitis  Hypertension  Dyslipidemia  Diabetes mellitus  Coronary artery disease  HLD (hyperlipidemia)  Anemia  Acute renal failure  CKD (chronic kidney disease)  COPD (chronic obstructive pulmonary disease)  Fainting  Cardiac arrhythmia  Dizziness  Hydronephrosis  Cholecystitis  Bipolar 1 disorder  DM (diabetes mellitus)  HTN (hypertension)  Lumbar radiculopathy  Left heart failure  Reflux esophagitis  Coronary atherosclerosis  History of cardiac catheterization  No significant past surgical history      FAMILY HISTORY:  No pertinent family history in first degree relatives      SOCIAL HISTORY:  Smoking: fomer  EtOH Use: social  Advance Directives: DNR    Allergies    No Known Allergies    Intolerances        HOME MEDICATIONS:   	acetaminophen 325 mg oral tablet: 2 tab(s) orally every 6 hours, As needed, For Temp greater than 38 C (100.4 F), Last Dose Taken:    · 	clopidogrel 75 mg oral tablet: 1 tab(s) orally once a day, Last Dose Taken:    · 	aspirin 81 mg oral tablet, chewable: 1 tab(s) orally once a day, Last Dose Taken:    · 	atorvastatin 40 mg oral tablet: 1 tab(s) orally once a day (at bedtime), Last Dose Taken:    · 	busPIRone 5 mg oral tablet: 1 tab(s) orally 2 times a day, Last Dose Taken:    · 	pantoprazole 40 mg oral granule, enteric coated: 1 dose(s) orally once a day, Last Dose Taken:    · 	ocular lubricant ophthalmic solution: 1 drop(s) to each affected eye 4 times a day, Last Dose Taken:    · 	amiodarone 200 mg oral tablet: 1 tab(s) orally once a day, Last Dose Taken:    · 	tamsulosin 0.4 mg oral capsule: 1 cap(s) orally once a day (at bedtime), Last Dose Taken:    · 	metoprolol tartrate 25 mg oral tablet: 1 tab(s) orally 2 times a day, Last Dose Taken:    · 	Nephro-Rachel oral tablet: 1 tab(s) orally once a day, Last Dose Taken:    · 	Breo Ellipta 100 mcg-25 mcg/inh inhalation powder: 1 puff(s) inhaled once a day, home, Last Dose Taken:    · 	Vitamin D3 50,000 intl units oral capsule: 1 cap(s) orally once a month, Last Dose Taken:    · 	Coumadin 2 mg oral tablet: 1 tab(s) orally once a day, Last Dose Taken:      REVIEW OF SYSTEMS:  Constitutional:   Eyes:  ENT:  CV: chest pain  Resp: shortness of breath  GI:  :  MSK:  Integumentary:  Neurological:  Psychiatric:  Endocrine:  Hematologic/Lymphatic:  Allergic/Immunologic:  [x] All other systems negative  [ ] Unable to assess ROS because ________    OBJECTIVE:  ICU Vital Signs Last 24 Hrs  T(C): 36.4 (21 Jun 2018 13:30), Max: 37.9 (20 Jun 2018 14:53)  T(F): 97.5 (21 Jun 2018 13:30), Max: 100.2 (20 Jun 2018 14:53)  HR: 85 (21 Jun 2018 13:30) (77 - 85)  BP: 145/78 (21 Jun 2018 13:30) (103/64 - 145/78)  BP(mean): --  ABP: --  ABP(mean): --  RR: 23 (21 Jun 2018 13:30) (18 - 23)  SpO2: 100% (21 Jun 2018 13:30) (100% - 100%)        CAPILLARY BLOOD GLUCOSE      POCT Blood Glucose.: 263 mg/dL (21 Jun 2018 12:38)      PHYSICAL EXAM:  General:   HEENT:   Lymph Nodes:  Neck:   Respiratory:   Cardiovascular:   Abdomen:   Extremities:   Skin:   Neurological:  Psychiatry:    HOSPITAL MEDICATIONS:  MEDICATIONS  (STANDING):  ALBUTerol/ipratropium for Nebulization 3 milliLiter(s) Nebulizer every 6 hours  amiodarone    Tablet 200 milliGRAM(s) Oral daily  artificial  tears Solution 1 Drop(s) Both EYES four times a day  aspirin  chewable 81 milliGRAM(s) Oral daily  atorvastatin 40 milliGRAM(s) Oral at bedtime  busPIRone 5 milliGRAM(s) Oral two times a day  clopidogrel Tablet 75 milliGRAM(s) Oral daily  dextrose 50% Injectable 12.5 Gram(s) IV Push once  dextrose 50% Injectable 25 Gram(s) IV Push once  dextrose 50% Injectable 25 Gram(s) IV Push once  insulin lispro (HumaLOG) corrective regimen sliding scale   SubCutaneous three times a day before meals  insulin lispro (HumaLOG) corrective regimen sliding scale   SubCutaneous at bedtime  metoprolol tartrate 25 milliGRAM(s) Oral two times a day  multivitamin 1 Tablet(s) Oral daily  pantoprazole    Tablet 40 milliGRAM(s) Oral before breakfast  piperacillin/tazobactam IVPB. 3.375 Gram(s) IV Intermittent every 12 hours  tamsulosin 0.4 milliGRAM(s) Oral at bedtime    MEDICATIONS  (PRN):  acetaminophen  Suppository 650 milliGRAM(s) Rectal every 6 hours PRN For Temp greater than 38 C (100.4 F)  dextrose 40% Gel 15 Gram(s) Oral once PRN Blood Glucose LESS THAN 70 milliGRAM(s)/deciliter  glucagon  Injectable 1 milliGRAM(s) IntraMuscular once PRN Glucose LESS THAN 70 milligrams/deciliter      LABS:                        8.7    8.87  )-----------( 172      ( 21 Jun 2018 06:45 )             28.9     06-21    133<L>  |  89<L>  |  53<H>  ----------------------------<  202<H>  4.8   |  18<L>  |  6.08<H>    Ca    9.0      21 Jun 2018 06:45  Phos  2.4     06-20  Mg     2.3     06-21    TPro  7.1  /  Alb  3.2<L>  /  TBili  0.5  /  DBili  x   /  AST  26  /  ALT  30  /  AlkPhos  90  06-20        RADIOLOGY:  [x] Reviewed and interpreted by me

## 2018-06-21 NOTE — PROGRESS NOTE ADULT - SUBJECTIVE AND OBJECTIVE BOX
[facility] Division of Kidney Diseases & Hypertension  FOLLOW UP NOTE  [phone]--------------------------------------------------------------------------------     HPI: 73 y/o M with CAD s/p 12-14 stents placed in 1990's, and BABAR x 5 placed in Sept 2017, CHF with EF 20%, DM, HTN, HLD, AF on coumadin, CVA , ESRD on HD (MWF) COPD, bipolar disorder, dementia admitted for SOB. Patient seen and examined today; appears more awake today.     PAST HISTORY  --------------------------------------------------------------------------------  No significant changes to PMH, PSH, FHx, SHx, unless otherwise noted    ALLERGIES & MEDICATIONS  --------------------------------------------------------------------------------  Allergies    No Known Allergies    Intolerances      Standing Inpatient Medications  amiodarone    Tablet 200 milliGRAM(s) Oral daily  artificial  tears Solution 1 Drop(s) Both EYES four times a day  aspirin  chewable 81 milliGRAM(s) Oral daily  atorvastatin 40 milliGRAM(s) Oral at bedtime  busPIRone 5 milliGRAM(s) Oral two times a day  clopidogrel Tablet 75 milliGRAM(s) Oral daily  dextrose 50% Injectable 12.5 Gram(s) IV Push once  dextrose 50% Injectable 25 Gram(s) IV Push once  dextrose 50% Injectable 25 Gram(s) IV Push once  insulin lispro (HumaLOG) corrective regimen sliding scale   SubCutaneous three times a day before meals  insulin lispro (HumaLOG) corrective regimen sliding scale   SubCutaneous at bedtime  metoprolol tartrate 25 milliGRAM(s) Oral two times a day  multivitamin 1 Tablet(s) Oral daily  pantoprazole    Tablet 40 milliGRAM(s) Oral before breakfast  piperacillin/tazobactam IVPB. 3.375 Gram(s) IV Intermittent every 12 hours  tamsulosin 0.4 milliGRAM(s) Oral at bedtime    PRN Inpatient Medications  acetaminophen  Suppository 650 milliGRAM(s) Rectal every 6 hours PRN  dextrose 40% Gel 15 Gram(s) Oral once PRN  glucagon  Injectable 1 milliGRAM(s) IntraMuscular once PRN      REVIEW OF SYSTEMS  --------------------------------------------------------------------------------        VITALS/PHYSICAL EXAM  --------------------------------------------------------------------------------  T(C): 36.4 (06-21-18 @ 05:29), Max: 37.9 (06-20-18 @ 14:53)  HR: 82 (06-21-18 @ 05:29) (77 - 82)  BP: 119/69 (06-21-18 @ 05:29) (103/64 - 119/69)  RR: 18 (06-21-18 @ 05:29) (18 - 18)  SpO2: 100% (06-21-18 @ 05:29) (100% - 100%)  Wt(kg): --  Height (cm): 175.26 (06-20-18 @ 17:19)  Weight (kg): 61.9 (06-20-18 @ 17:19)  BMI (kg/m2): 20.2 (06-20-18 @ 17:19)  BSA (m2): 1.76 (06-20-18 @ 17:19)      Physical Exam:  	Gen: NAD, well-appearing  	HEENT: PERRL, supple neck, clear oropharynx  	Pulm: CTA B/L  	CV: RRR, S1S2;  	Back: No spinal or CVA tenderness  	Abd: +BS, soft, nontender/nondistended  	: No suprapubic tenderness                      Extremities: no bilateral LE edema noted.                       Neuro: No focal deficits, intact gait  	Skin: Warm, without rashes  	Vascular access:    LABS/STUDIES  --------------------------------------------------------------------------------              8.7    8.87  >-----------<  172      [06-21-18 @ 06:45]              28.9     133  |  89  |  53  ----------------------------<  202      [06-21-18 @ 06:45]  4.8   |  18  |  6.08        Ca     9.0     [06-21-18 @ 06:45]      Mg     2.3     [06-21-18 @ 06:45]      Phos  2.4     [06-20-18 @ 06:00]    TPro  7.1  /  Alb  3.2  /  TBili  0.5  /  DBili  x   /  AST  26  /  ALT  30  /  AlkPhos  90  [06-20-18 @ 00:20]          Creatinine Trend:  SCr 6.08 [06-21 @ 06:45]  SCr 4.72 [06-20 @ 06:00]  SCr 4.12 [06-20 @ 00:20]  SCr 7.78 [06-19 @ 01:11]        Iron 41, TIBC 157, %sat --      [06-20-18 @ 06:00]  Ferritin 2578      [06-20-18 @ 06:00]  PTH 76.54 (Ca --)      [06-20-18 @ 06:00]   --  HbA1c 6.6      [06-20-18 @ 06:00]    HBsAb <3.0      [06-19-18 @ 17:05]  HBsAg NEGATIVE      [06-19-18 @ 17:05]  HBcAb Nonreactive      [06-19-18 @ 17:05]  HCV 0.35, Nonreactive Hepatitis C AB  S/CO Ratio                        Interpretation  < 1.0                                     Non-Reactive  1.0 - 4.9                           Weakly-Reactive  > 5.0                                 Reactive  Non-Reactive: Aperson with a non-reactive HCV antibody  result is considered uninfected.  No further action is  needed unless recent infection is suspected.  In these  cases, consider repeat testing later to detect  seroconversion..  Weakly-Reactive: HCV antibody test is abnormal, HCV RNA  Qualitative test will follow.  Reactive: HCV antibody test is abnormal, HCV RNA  Qualitative test will follow.  Note: HCV antibody testing is performed on the Abbott   system.      [06-19-18 @ 17:05] [facility] Division of Kidney Diseases & Hypertension  FOLLOW UP NOTE  [phone]--------------------------------------------------------------------------------     HPI: 73 y/o M with CAD s/p 12-14 stents placed in 1990's, and BABAR x 5 placed in Sept 2017, CHF with EF 20%, DM, HTN, HLD, AF on coumadin, CVA , ESRD on HD (MWF) COPD, bipolar disorder, dementia admitted for SOB. Patient seen and examined today; appears more awake today.     PAST HISTORY  --------------------------------------------------------------------------------  No significant changes to PMH, PSH, FHx, SHx, unless otherwise noted    ALLERGIES & MEDICATIONS  --------------------------------------------------------------------------------  Allergies    No Known Allergies    Intolerances      Standing Inpatient Medications  amiodarone    Tablet 200 milliGRAM(s) Oral daily  artificial  tears Solution 1 Drop(s) Both EYES four times a day  aspirin  chewable 81 milliGRAM(s) Oral daily  atorvastatin 40 milliGRAM(s) Oral at bedtime  busPIRone 5 milliGRAM(s) Oral two times a day  clopidogrel Tablet 75 milliGRAM(s) Oral daily  dextrose 50% Injectable 12.5 Gram(s) IV Push once  dextrose 50% Injectable 25 Gram(s) IV Push once  dextrose 50% Injectable 25 Gram(s) IV Push once  insulin lispro (HumaLOG) corrective regimen sliding scale   SubCutaneous three times a day before meals  insulin lispro (HumaLOG) corrective regimen sliding scale   SubCutaneous at bedtime  metoprolol tartrate 25 milliGRAM(s) Oral two times a day  multivitamin 1 Tablet(s) Oral daily  pantoprazole    Tablet 40 milliGRAM(s) Oral before breakfast  piperacillin/tazobactam IVPB. 3.375 Gram(s) IV Intermittent every 12 hours  tamsulosin 0.4 milliGRAM(s) Oral at bedtime    PRN Inpatient Medications  acetaminophen  Suppository 650 milliGRAM(s) Rectal every 6 hours PRN  dextrose 40% Gel 15 Gram(s) Oral once PRN  glucagon  Injectable 1 milliGRAM(s) IntraMuscular once PRN      REVIEW OF SYSTEMS  --------------------------------------------------------------------------------  Unable to obtain    VITALS/PHYSICAL EXAM  --------------------------------------------------------------------------------  T(C): 36.4 (06-21-18 @ 05:29), Max: 37.9 (06-20-18 @ 14:53)  HR: 82 (06-21-18 @ 05:29) (77 - 82)  BP: 119/69 (06-21-18 @ 05:29) (103/64 - 119/69)  RR: 18 (06-21-18 @ 05:29) (18 - 18)  SpO2: 100% (06-21-18 @ 05:29) (100% - 100%)  Wt(kg): --  Height (cm): 175.26 (06-20-18 @ 17:19)  Weight (kg): 61.9 (06-20-18 @ 17:19)  BMI (kg/m2): 20.2 (06-20-18 @ 17:19)  BSA (m2): 1.76 (06-20-18 @ 17:19)      Physical Exam:  	  Gen: Elderly male, NAD  	HEENT: anicteric  	Pulm: Rales present over both lungs.   	CV:  S1S2 rrr  	Abd: +BS, soft   	Ext: No B/L Lower ext edema  	Neuro: minimally verbal, follows commands  	Skin: Warm, without rashes  	Vascular access: Right UE AVF present; thrill and bruit heard.       LABS/STUDIES  --------------------------------------------------------------------------------              8.7    8.87  >-----------<  172      [06-21-18 @ 06:45]              28.9     133  |  89  |  53  ----------------------------<  202      [06-21-18 @ 06:45]  4.8   |  18  |  6.08        Ca     9.0     [06-21-18 @ 06:45]      Mg     2.3     [06-21-18 @ 06:45]      Phos  2.4     [06-20-18 @ 06:00]    TPro  7.1  /  Alb  3.2  /  TBili  0.5  /  DBili  x   /  AST  26  /  ALT  30  /  AlkPhos  90  [06-20-18 @ 00:20]          Creatinine Trend:  SCr 6.08 [06-21 @ 06:45]  SCr 4.72 [06-20 @ 06:00]  SCr 4.12 [06-20 @ 00:20]  SCr 7.78 [06-19 @ 01:11]        Iron 41, TIBC 157, %sat --      [06-20-18 @ 06:00]  Ferritin 2578      [06-20-18 @ 06:00]  PTH 76.54 (Ca --)      [06-20-18 @ 06:00]   --  HbA1c 6.6      [06-20-18 @ 06:00]    HBsAb <3.0      [06-19-18 @ 17:05]  HBsAg NEGATIVE      [06-19-18 @ 17:05]  HBcAb Nonreactive      [06-19-18 @ 17:05]  HCV 0.35, Nonreactive Hepatitis C AB  S/CO Ratio                        Interpretation  < 1.0                                     Non-Reactive  1.0 - 4.9                           Weakly-Reactive  > 5.0                                 Reactive  Non-Reactive: Aperson with a non-reactive HCV antibody  result is considered uninfected.  No further action is  needed unless recent infection is suspected.  In these  cases, consider repeat testing later to detect  seroconversion..  Weakly-Reactive: HCV antibody test is abnormal, HCV RNA  Qualitative test will follow.  Reactive: HCV antibody test is abnormal, HCV RNA  Qualitative test will follow.  Note: HCV antibody testing is performed on the [employer]   system.      [06-19-18 @ 17:05]

## 2018-06-21 NOTE — PROGRESS NOTE ADULT - PROBLEM SELECTOR PLAN 2
BP noted to be low today. Recommend to hold antihypertensive medications for now. Monitor BP. Recommend to hold antihypertensive medications for now. Monitor BP.

## 2018-06-21 NOTE — PROGRESS NOTE ADULT - PROBLEM SELECTOR PLAN 1
Patient with h/o ESRD on HD; MWF schedule. Last HD was done on 6/19 but was unable to complete due to HD. Labs reviewed from today. Will arrange for HD today. Monitor BMP and BP. Patient with h/o ESRD on HD; MWF schedule. Last HD was done on 6/19 but was unable to complete due to hypotension and tachycardia. Labs reviewed from today. Will arrange for HD today. Monitor BMP and BP.

## 2018-06-21 NOTE — CONSULT NOTE ADULT - ASSESSMENT
72 year old with esrd and CHF presents with shortness of breaht and hypoxia.   He improved after diuresis with furosemide.    His white blood cell count was elevated but he has been afebrile.    Overall, he appears to have improved with diuresis/ without antibiotics.  Pneumonia seems less likely. ? fluid overload/ CHF exacerbation.    Monitor off antimicrobials.    Monitor leukocytosis.
73 y/o M with CAD s/p 12-14 stents placed in 1990's, and BABAR x 5 placed in Sept 2017, CHF with EF 20%, DM, HTN, HLD, AF on coumadin, CVA , ESRD on HD (MWF) COPD, bipolar disorder, dementia admitted for SOB secondary to fluid overload.
Echo - 9/17 - EF severely decreased    a/p     1) Acute on chronic systolic CHF - pt on HD, SOB improving mild troponin elevation likely sec to ESRD and CHF    2) CAD s/p PCI - on ASA and plavix     3) ESRD on HD
71 y/o M with CAD s/p 12-14 stents placed in 1990's, and BABAR x 5 placed in Sept 2017, CHF with EF 20%, DM, HTN, HLD, AF on coumadin, CVA with R facial droop, ESRD on HD (MWF) COPD, bipolar disorder, transferred from [facility] for respiratory distress.    1.  Acute Hypoxic Respiratory Failure-  -Etiology: secondary to fluid overload  -POCUS: diffuse bilateral B-line pattern  -Needs urgent hemodialysis  -Placed on NIPPV support for short-term while getting HD  -Cont to monitor volume status  -Supplemental oxygen as needed  -Monitor BPs: given systolic dysfunction would target SBP<120    Margaritoduc Magaña DO  Pulmonary and Critical Care Fellow

## 2018-06-21 NOTE — PROGRESS NOTE ADULT - ASSESSMENT
Echo - 9/17 - EF severely decreased    a/p     1) Acute on chronic systolic CHF - pt on HD, SOB improving mild troponin elevation likely sec to ESRD and CHF    2) CAD s/p PCI - on ASA and plavix     3) ESRD on HD    4) S/P code sepsis - UTI vs. PNA, ID following, on iv abx, blood cx gram + cocci in chains, ua/urine cx pending Echo - 9/17 - EF severely decreased    a/p     1) Acute on chronic systolic CHF - pt. on HD, more SOB today, plan for extra dialysis session per renal for fluid removal, recommend repeat cxr, abg, repeat echo, pulm consult.   mild troponin elevation likely sec to ESRD and CHF    2) CAD s/p PCI - on ASA and plavix     3) ESRD on HD    4) S/P code sepsis - UTI vs. PNA, ID following, on iv abx, blood cx gram + cocci in chains, ua/urine cx pending.

## 2018-06-21 NOTE — PROGRESS NOTE ADULT - SUBJECTIVE AND OBJECTIVE BOX
chief complaint : dyspnea        SUBJECTIVE / OVERNIGHT EVENTS: pt was wheezing earlier improved after albuterol nebs/ bipap    MEDICATIONS  (STANDING):  ALBUTerol/ipratropium for Nebulization 3 milliLiter(s) Nebulizer every 6 hours  amiodarone    Tablet 200 milliGRAM(s) Oral daily  artificial  tears Solution 1 Drop(s) Both EYES four times a day  aspirin  chewable 81 milliGRAM(s) Oral daily  atorvastatin 40 milliGRAM(s) Oral at bedtime  busPIRone 5 milliGRAM(s) Oral two times a day  clopidogrel Tablet 75 milliGRAM(s) Oral daily  dextrose 50% Injectable 12.5 Gram(s) IV Push once  dextrose 50% Injectable 25 Gram(s) IV Push once  dextrose 50% Injectable 25 Gram(s) IV Push once  insulin lispro (HumaLOG) corrective regimen sliding scale   SubCutaneous three times a day before meals  insulin lispro (HumaLOG) corrective regimen sliding scale   SubCutaneous at bedtime  metoprolol tartrate 25 milliGRAM(s) Oral two times a day  multivitamin 1 Tablet(s) Oral daily  pantoprazole    Tablet 40 milliGRAM(s) Oral before breakfast  piperacillin/tazobactam IVPB. 3.375 Gram(s) IV Intermittent every 12 hours  tamsulosin 0.4 milliGRAM(s) Oral at bedtime    MEDICATIONS  (PRN):  acetaminophen  Suppository 650 milliGRAM(s) Rectal every 6 hours PRN For Temp greater than 38 C (100.4 F)  dextrose 40% Gel 15 Gram(s) Oral once PRN Blood Glucose LESS THAN 70 milliGRAM(s)/deciliter  glucagon  Injectable 1 milliGRAM(s) IntraMuscular once PRN Glucose LESS THAN 70 milligrams/deciliter    Vital Signs Last 24 Hrs  T(C): 36.5 (21 Jun 2018 21:51), Max: 36.8 (21 Jun 2018 17:50)  T(F): 97.7 (21 Jun 2018 21:51), Max: 98.2 (21 Jun 2018 17:50)  HR: 72 (21 Jun 2018 21:51) (72 - 105)  BP: 102/53 (21 Jun 2018 21:51) (102/53 - 145/78)  BP(mean): --  RR: 16 (21 Jun 2018 21:51) (16 - 25)  SpO2: 100% (21 Jun 2018 21:51) (98% - 100%)    Constitutional: No fever, fatigue  Skin: No rash.  Eyes: No recent vision problems or eye pain.  ENT: No congestion, ear pain, or sore throat.  Cardiovascular: No chest pain or palpation.  Respiratory: No cough, shortness of breath, congestion, or wheezing.  Gastrointestinal: No abdominal pain, nausea, vomiting, or diarrhea.  Genitourinary: No dysuria.  Musculoskeletal: No joint swelling.  Neurologic: No headache.    PHYSICAL EXAM:  GENERAL: NAD  EYES: EOMI, PERRLA  NECK: Supple, No JVD  CHEST/LUNG: dec breath sounds at bases, fine exp wheezing+  HEART:  S1 , S2 +  ABDOMEN: soft bs+  EXTREMITIES:  trace edema+  NEUROLOGY:alert awake       LABS:                        7.8    8.25  )-----------( 221      ( 21 Jun 2018 13:35 )             24.5     06-21    135  |  91<L>  |  55<H>  ----------------------------<  225<H>  4.1   |  24  |  6.67<H>    Ca    9.0      21 Jun 2018 13:35  Phos  2.4     06-20  Mg     2.3     06-21    TPro  7.1  /  Alb  3.2<L>  /  TBili  0.5  /  DBili  x   /  AST  26  /  ALT  30  /  AlkPhos  90  06-20    PT/INR - ( 21 Jun 2018 13:35 )   PT: 23.5 SEC;   INR: 2.01          PTT - ( 21 Jun 2018 13:35 )  PTT:37.4 SEC          RADIOLOGY & ADDITIONAL TESTS:    Imaging Personally Reviewed:    Consultant(s) Notes Reviewed:      Care Discussed with Consultants/Other Providers:

## 2018-06-21 NOTE — PROGRESS NOTE ADULT - SUBJECTIVE AND OBJECTIVE BOX
CARDIOLOGY FOLLOW UP - Dr. Patterson  Coverage for Dr. Soria       CC no events        PHYSICAL EXAM:  T(C): 36.4 (06-21-18 @ 05:29), Max: 37.9 (06-20-18 @ 14:53)  HR: 82 (06-21-18 @ 05:29) (77 - 85)  BP: 119/69 (06-21-18 @ 05:29) (103/64 - 119/69)  RR: 18 (06-21-18 @ 05:29) (18 - 18)  SpO2: 100% (06-21-18 @ 05:29) (96% - 100%)  Wt(kg): --  I&O's Summary      Appearance: Normal	  Cardiovascular: Normal S1 S2,RRR, No JVD, No murmurs  Respiratory: + wheeze   Gastrointestinal:  Soft, Non-tender, + BS	  Extremities:  No clubbing, cyanosis or edema        MEDICATIONS  (STANDING):  amiodarone    Tablet 200 milliGRAM(s) Oral daily  artificial  tears Solution 1 Drop(s) Both EYES four times a day  aspirin  chewable 81 milliGRAM(s) Oral daily  atorvastatin 40 milliGRAM(s) Oral at bedtime  busPIRone 5 milliGRAM(s) Oral two times a day  clopidogrel Tablet 75 milliGRAM(s) Oral daily  dextrose 50% Injectable 12.5 Gram(s) IV Push once  dextrose 50% Injectable 25 Gram(s) IV Push once  dextrose 50% Injectable 25 Gram(s) IV Push once  insulin lispro (HumaLOG) corrective regimen sliding scale   SubCutaneous three times a day before meals  insulin lispro (HumaLOG) corrective regimen sliding scale   SubCutaneous at bedtime  metoprolol tartrate 25 milliGRAM(s) Oral two times a day  multivitamin 1 Tablet(s) Oral daily  pantoprazole    Tablet 40 milliGRAM(s) Oral before breakfast  piperacillin/tazobactam IVPB. 3.375 Gram(s) IV Intermittent every 12 hours  tamsulosin 0.4 milliGRAM(s) Oral at bedtime  vancomycin  IVPB 1000 milliGRAM(s) IV Intermittent once      TELEMETRY: SR 1st degree AVB 	    ECG:  	  RADIOLOGY:   DIAGNOSTIC TESTING:  [ ] Echocardiogram:  [ ]  Catheterization:  [ ] Stress Test:    OTHER: 	    LABS:	 	                                8.7    8.87  )-----------( 172      ( 21 Jun 2018 06:45 )             28.9     06-21    133<L>  |  89<L>  |  53<H>  ----------------------------<  202<H>  4.8   |  18<L>  |  6.08<H>    Ca    9.0      21 Jun 2018 06:45  Phos  2.4     06-20  Mg     2.3     06-21    TPro  7.1  /  Alb  3.2<L>  /  TBili  0.5  /  DBili  x   /  AST  26  /  ALT  30  /  AlkPhos  90  06-20

## 2018-06-22 LAB
-  AMPICILLIN: SIGNIFICANT CHANGE UP
-  GENTAMICIN 500: SIGNIFICANT CHANGE UP
-  STREPTOMYCIN 1000: SIGNIFICANT CHANGE UP
-  VANCOMYCIN: SIGNIFICANT CHANGE UP
BACTERIA BLD CULT: SIGNIFICANT CHANGE UP
BASOPHILS # BLD AUTO: 0.02 K/UL — SIGNIFICANT CHANGE UP (ref 0–0.2)
BASOPHILS NFR BLD AUTO: 0.3 % — SIGNIFICANT CHANGE UP (ref 0–2)
BLD GP AB SCN SERPL QL: NEGATIVE — SIGNIFICANT CHANGE UP
BUN SERPL-MCNC: 31 MG/DL — HIGH (ref 7–23)
CALCIUM SERPL-MCNC: 9.3 MG/DL — SIGNIFICANT CHANGE UP (ref 8.4–10.5)
CHLORIDE SERPL-SCNC: 93 MMOL/L — LOW (ref 98–107)
CO2 SERPL-SCNC: 28 MMOL/L — SIGNIFICANT CHANGE UP (ref 22–31)
CREAT SERPL-MCNC: 4.68 MG/DL — HIGH (ref 0.5–1.3)
ENTEROCOC DNA BLD POS QL NAA+NON-PROBE: SIGNIFICANT CHANGE UP
EOSINOPHIL # BLD AUTO: 0.18 K/UL — SIGNIFICANT CHANGE UP (ref 0–0.5)
EOSINOPHIL NFR BLD AUTO: 2.7 % — SIGNIFICANT CHANGE UP (ref 0–6)
GLUCOSE BLDC GLUCOMTR-MCNC: 167 MG/DL — HIGH (ref 70–99)
GLUCOSE BLDC GLUCOMTR-MCNC: 236 MG/DL — HIGH (ref 70–99)
GLUCOSE BLDC GLUCOMTR-MCNC: 255 MG/DL — HIGH (ref 70–99)
GLUCOSE BLDC GLUCOMTR-MCNC: 267 MG/DL — HIGH (ref 70–99)
GLUCOSE SERPL-MCNC: 149 MG/DL — HIGH (ref 70–99)
HCT VFR BLD CALC: 23.6 % — LOW (ref 39–50)
HCT VFR BLD CALC: 24.1 % — LOW (ref 39–50)
HGB BLD-MCNC: 7.2 G/DL — LOW (ref 13–17)
HGB BLD-MCNC: 7.4 G/DL — LOW (ref 13–17)
IMM GRANULOCYTES # BLD AUTO: 0.02 # — SIGNIFICANT CHANGE UP
IMM GRANULOCYTES NFR BLD AUTO: 0.3 % — SIGNIFICANT CHANGE UP (ref 0–1.5)
LYMPHOCYTES # BLD AUTO: 1.93 K/UL — SIGNIFICANT CHANGE UP (ref 1–3.3)
LYMPHOCYTES # BLD AUTO: 29.4 % — SIGNIFICANT CHANGE UP (ref 13–44)
MAGNESIUM SERPL-MCNC: 2.3 MG/DL — SIGNIFICANT CHANGE UP (ref 1.6–2.6)
MCHC RBC-ENTMCNC: 28.1 PG — SIGNIFICANT CHANGE UP (ref 27–34)
MCHC RBC-ENTMCNC: 28.1 PG — SIGNIFICANT CHANGE UP (ref 27–34)
MCHC RBC-ENTMCNC: 30.5 % — LOW (ref 32–36)
MCHC RBC-ENTMCNC: 30.7 % — LOW (ref 32–36)
MCV RBC AUTO: 91.6 FL — SIGNIFICANT CHANGE UP (ref 80–100)
MCV RBC AUTO: 92.2 FL — SIGNIFICANT CHANGE UP (ref 80–100)
METHOD TYPE: SIGNIFICANT CHANGE UP
MONOCYTES # BLD AUTO: 0.56 K/UL — SIGNIFICANT CHANGE UP (ref 0–0.9)
MONOCYTES NFR BLD AUTO: 8.5 % — SIGNIFICANT CHANGE UP (ref 2–14)
NEUTROPHILS # BLD AUTO: 3.86 K/UL — SIGNIFICANT CHANGE UP (ref 1.8–7.4)
NEUTROPHILS NFR BLD AUTO: 58.8 % — SIGNIFICANT CHANGE UP (ref 43–77)
NRBC # FLD: 0 — SIGNIFICANT CHANGE UP
NRBC # FLD: 0 — SIGNIFICANT CHANGE UP
OB PNL STL: NEGATIVE — SIGNIFICANT CHANGE UP
PLATELET # BLD AUTO: 244 K/UL — SIGNIFICANT CHANGE UP (ref 150–400)
PLATELET # BLD AUTO: 245 K/UL — SIGNIFICANT CHANGE UP (ref 150–400)
PMV BLD: 10.7 FL — SIGNIFICANT CHANGE UP (ref 7–13)
PMV BLD: 10.8 FL — SIGNIFICANT CHANGE UP (ref 7–13)
POTASSIUM SERPL-MCNC: 3.6 MMOL/L — SIGNIFICANT CHANGE UP (ref 3.5–5.3)
POTASSIUM SERPL-SCNC: 3.6 MMOL/L — SIGNIFICANT CHANGE UP (ref 3.5–5.3)
RBC # BLD: 2.56 M/UL — LOW (ref 4.2–5.8)
RBC # BLD: 2.63 M/UL — LOW (ref 4.2–5.8)
RBC # FLD: 14.6 % — HIGH (ref 10.3–14.5)
RBC # FLD: 14.6 % — HIGH (ref 10.3–14.5)
RH IG SCN BLD-IMP: NEGATIVE — SIGNIFICANT CHANGE UP
SODIUM SERPL-SCNC: 138 MMOL/L — SIGNIFICANT CHANGE UP (ref 135–145)
SPECIMEN SOURCE: SIGNIFICANT CHANGE UP
SPECIMEN SOURCE: SIGNIFICANT CHANGE UP
VANCOMYCIN FLD-MCNC: 18.5 UG/ML — SIGNIFICANT CHANGE UP
WBC # BLD: 6 K/UL — SIGNIFICANT CHANGE UP (ref 3.8–10.5)
WBC # BLD: 6.57 K/UL — SIGNIFICANT CHANGE UP (ref 3.8–10.5)
WBC # FLD AUTO: 6 K/UL — SIGNIFICANT CHANGE UP (ref 3.8–10.5)
WBC # FLD AUTO: 6.57 K/UL — SIGNIFICANT CHANGE UP (ref 3.8–10.5)

## 2018-06-22 PROCEDURE — 99232 SBSQ HOSP IP/OBS MODERATE 35: CPT

## 2018-06-22 PROCEDURE — 99232 SBSQ HOSP IP/OBS MODERATE 35: CPT | Mod: GC

## 2018-06-22 PROCEDURE — 74177 CT ABD & PELVIS W/CONTRAST: CPT | Mod: 26

## 2018-06-22 RX ORDER — AMPICILLIN TRIHYDRATE 250 MG
2 CAPSULE ORAL EVERY 12 HOURS
Qty: 0 | Refills: 0 | Status: DISCONTINUED | OUTPATIENT
Start: 2018-06-22 | End: 2018-07-05

## 2018-06-22 RX ADMIN — Medication 81 MILLIGRAM(S): at 12:28

## 2018-06-22 RX ADMIN — Medication 5 MILLIGRAM(S): at 17:36

## 2018-06-22 RX ADMIN — TAMSULOSIN HYDROCHLORIDE 0.4 MILLIGRAM(S): 0.4 CAPSULE ORAL at 23:00

## 2018-06-22 RX ADMIN — Medication 3 MILLILITER(S): at 15:53

## 2018-06-22 RX ADMIN — PIPERACILLIN AND TAZOBACTAM 25 GRAM(S): 4; .5 INJECTION, POWDER, LYOPHILIZED, FOR SOLUTION INTRAVENOUS at 05:35

## 2018-06-22 RX ADMIN — Medication 1: at 09:17

## 2018-06-22 RX ADMIN — Medication 3 MILLILITER(S): at 11:42

## 2018-06-22 RX ADMIN — PANTOPRAZOLE SODIUM 40 MILLIGRAM(S): 20 TABLET, DELAYED RELEASE ORAL at 05:35

## 2018-06-22 RX ADMIN — AMIODARONE HYDROCHLORIDE 200 MILLIGRAM(S): 400 TABLET ORAL at 05:34

## 2018-06-22 RX ADMIN — Medication 216 GRAM(S): at 17:36

## 2018-06-22 RX ADMIN — Medication 1 DROP(S): at 12:13

## 2018-06-22 RX ADMIN — Medication 1: at 23:01

## 2018-06-22 RX ADMIN — CLOPIDOGREL BISULFATE 75 MILLIGRAM(S): 75 TABLET, FILM COATED ORAL at 12:28

## 2018-06-22 RX ADMIN — Medication 1 DROP(S): at 23:01

## 2018-06-22 RX ADMIN — Medication 2: at 13:59

## 2018-06-22 RX ADMIN — Medication 5 MILLIGRAM(S): at 05:35

## 2018-06-22 RX ADMIN — Medication 25 MILLIGRAM(S): at 05:35

## 2018-06-22 RX ADMIN — Medication 1 DROP(S): at 05:32

## 2018-06-22 RX ADMIN — Medication 1 DROP(S): at 17:37

## 2018-06-22 RX ADMIN — Medication 25 MILLIGRAM(S): at 17:36

## 2018-06-22 RX ADMIN — Medication 3 MILLILITER(S): at 21:14

## 2018-06-22 RX ADMIN — Medication 3: at 17:49

## 2018-06-22 RX ADMIN — Medication 1 TABLET(S): at 12:28

## 2018-06-22 RX ADMIN — ATORVASTATIN CALCIUM 40 MILLIGRAM(S): 80 TABLET, FILM COATED ORAL at 23:00

## 2018-06-22 NOTE — PROGRESS NOTE ADULT - PROBLEM SELECTOR PLAN 1
Patient with h/o ESRD on HD; MWF schedule. Last HD was done on 6/21 and tolerated better now that sepsis addressed.  Patient has no urgent indication for HD today but will request first shift for tomorrow.

## 2018-06-22 NOTE — PROGRESS NOTE ADULT - SUBJECTIVE AND OBJECTIVE BOX
[facility] [place] [facility] Division of Kidney Diseases & Hypertension  FOLLOW UP NOTE  --------------------------------------------------------------------------------     HPI: 71 y/o M with CAD s/p 12-14 stents placed in 1990's, and BABAR x 5 placed in Sept 2017, CHF with EF 20%, DM, HTN, HLD, AF on coumadin, CVA , ESRD on HD (MWF) COPD, bipolar disorder, dementia admitted for SOB. Patient seen and examined today tolerated HD yesterday with > 1 L off.  Seen and evaluated this afternoon resting comfortably.    PAST HISTORY  --------------------------------------------------------------------------------  No significant changes to PMH, PSH, FHx, SHx, unless otherwise noted    ALLERGIES & MEDICATIONS  --------------------------------------------------------------------------------  Allergies    No Known Allergies    Intolerances      Standing Inpatient Medications  ALBUTerol/ipratropium for Nebulization 3 milliLiter(s) Nebulizer every 6 hours  amiodarone    Tablet 200 milliGRAM(s) Oral daily  ampicillin  IVPB 2 Gram(s) IV Intermittent every 12 hours  artificial  tears Solution 1 Drop(s) Both EYES four times a day  aspirin  chewable 81 milliGRAM(s) Oral daily  atorvastatin 40 milliGRAM(s) Oral at bedtime  busPIRone 5 milliGRAM(s) Oral two times a day  clopidogrel Tablet 75 milliGRAM(s) Oral daily  dextrose 50% Injectable 12.5 Gram(s) IV Push once  dextrose 50% Injectable 25 Gram(s) IV Push once  dextrose 50% Injectable 25 Gram(s) IV Push once  insulin lispro (HumaLOG) corrective regimen sliding scale   SubCutaneous three times a day before meals  insulin lispro (HumaLOG) corrective regimen sliding scale   SubCutaneous at bedtime  metoprolol tartrate 25 milliGRAM(s) Oral two times a day  multivitamin 1 Tablet(s) Oral daily  pantoprazole    Tablet 40 milliGRAM(s) Oral before breakfast  tamsulosin 0.4 milliGRAM(s) Oral at bedtime    PRN Inpatient Medications  acetaminophen  Suppository 650 milliGRAM(s) Rectal every 6 hours PRN  dextrose 40% Gel 15 Gram(s) Oral once PRN  glucagon  Injectable 1 milliGRAM(s) IntraMuscular once PRN      REVIEW OF SYSTEMS  --------------------------------------------------------------------------------  unable to obtain due to poor mental status.      VITALS/PHYSICAL EXAM  --------------------------------------------------------------------------------  T(C): 37.5 (06-22-18 @ 13:47), Max: 37.5 (06-22-18 @ 13:47)  HR: 75 (06-22-18 @ 17:31) (72 - 105)  BP: 122/63 (06-22-18 @ 17:31) (102/53 - 130/50)  RR: 16 (06-22-18 @ 13:47) (16 - 22)  SpO2: 98% (06-22-18 @ 15:55) (95% - 100%)  Wt(kg): --        06-21-18 @ 07:01  -  06-22-18 @ 07:00  --------------------------------------------------------  IN: 520 mL / OUT: 1700 mL / NET: -1180 mL    06-22-18 @ 07:01  -  06-22-18 @ 17:34  --------------------------------------------------------  IN: 320 mL / OUT: 0 mL / NET: 320 mL      Physical Exam:                Gen: Elderly male, NAD  	HEENT: anicteric  	Pulm: Rales present over both lungs.   	CV:  S1S2 rrr  	Abd: +BS, soft   	Ext: No B/L Lower ext edema  	Neuro: minimally verbal, follows commands  	Skin: Warm, without rashes  	Vascular access: Right UE AVF present; thrill and bruit heard.     LABS/STUDIES  --------------------------------------------------------------------------------              7.4    6.57  >-----------<  244      [06-22-18 @ 08:10]              24.1     138  |  93  |  31  ----------------------------<  149      [06-22-18 @ 06:30]  3.6   |  28  |  4.68        Ca     9.3     [06-22-18 @ 06:30]      Mg     2.3     [06-22-18 @ 06:30]      PT/INR: PT 23.5 , INR 2.01       [06-21-18 @ 13:35]  PTT: 37.4       [06-21-18 @ 13:35]      Creatinine Trend:  SCr 4.68 [06-22 @ 06:30]  SCr 6.67 [06-21 @ 13:35]  SCr 6.08 [06-21 @ 06:45]  SCr 4.72 [06-20 @ 06:00]  SCr 4.12 [06-20 @ 00:20]        Iron 41, TIBC 157, %sat --      [06-20-18 @ 06:00]  Ferritin 2578      [06-20-18 @ 06:00]  PTH 76.54 (Ca --)      [06-20-18 @ 06:00]   --  HbA1c 6.6      [06-20-18 @ 06:00]    HBsAb <3.0      [06-19-18 @ 17:05]  HBsAg NEGATIVE      [06-19-18 @ 17:05]  HBcAb Nonreactive      [06-19-18 @ 17:05]  HCV 0.35, Nonreactive Hepatitis C AB  S/CO Ratio                        Interpretation  < 1.0                                     Non-Reactive  1.0 - 4.9                           Weakly-Reactive  > 5.0                                 Reactive  Non-Reactive: [place] with a non-reactive HCV antibody  result is considered uninfected.  No further action is  needed unless recent infection is suspected.  In these  cases, consider repeat testing later to detect  seroconversion..  Weakly-Reactive: HCV antibody test is abnormal, HCV RNA  Qualitative test will follow.  Reactive: HCV antibody test is abnormal, HCV RNA  Qualitative test will follow.  Note: HCV antibody testing is performed on the [employer] system.      [06-19-18 @ 17:05]

## 2018-06-22 NOTE — PROGRESS NOTE ADULT - SUBJECTIVE AND OBJECTIVE BOX
Follow Up:      Inverval History/ROS:Patient is a 72y old  Male who presents with a chief complaint of Dyspnea (19 Jun 2018 06:32)    No fever.   No events.     Allergies    No Known Allergies    Intolerances        ANTIMICROBIALS:  piperacillin/tazobactam IVPB. 3.375 every 12 hours      OTHER MEDS:  acetaminophen  Suppository 650 milliGRAM(s) Rectal every 6 hours PRN  ALBUTerol/ipratropium for Nebulization 3 milliLiter(s) Nebulizer every 6 hours  amiodarone    Tablet 200 milliGRAM(s) Oral daily  artificial  tears Solution 1 Drop(s) Both EYES four times a day  aspirin  chewable 81 milliGRAM(s) Oral daily  atorvastatin 40 milliGRAM(s) Oral at bedtime  busPIRone 5 milliGRAM(s) Oral two times a day  clopidogrel Tablet 75 milliGRAM(s) Oral daily  dextrose 40% Gel 15 Gram(s) Oral once PRN  dextrose 50% Injectable 12.5 Gram(s) IV Push once  dextrose 50% Injectable 25 Gram(s) IV Push once  dextrose 50% Injectable 25 Gram(s) IV Push once  glucagon  Injectable 1 milliGRAM(s) IntraMuscular once PRN  insulin lispro (HumaLOG) corrective regimen sliding scale   SubCutaneous three times a day before meals  insulin lispro (HumaLOG) corrective regimen sliding scale   SubCutaneous at bedtime  metoprolol tartrate 25 milliGRAM(s) Oral two times a day  multivitamin 1 Tablet(s) Oral daily  pantoprazole    Tablet 40 milliGRAM(s) Oral before breakfast  tamsulosin 0.4 milliGRAM(s) Oral at bedtime      Vital Signs Last 24 Hrs  T(C): 37.5 (22 Jun 2018 13:47), Max: 37.5 (22 Jun 2018 13:47)  T(F): 99.5 (22 Jun 2018 13:47), Max: 99.5 (22 Jun 2018 13:47)  HR: 83 (22 Jun 2018 13:47) (72 - 105)  BP: 125/70 (22 Jun 2018 13:47) (102/53 - 130/50)  BP(mean): --  RR: 16 (22 Jun 2018 13:47) (16 - 22)  SpO2: 95% (22 Jun 2018 13:47) (95% - 100%)    PHYSICAL EXAM:  General: [x ] non-toxic  HEAD/EYES: [ ] PERRL [x ] white sclera [ ] icterus  ENT:  [ ] normal [x ] supple [ ] thrush [ ] pharyngeal exudate  Cardiovascular:   [ ] murmur [x ] normal [ ] PPM/AICD  Respiratory:  [ x] clear to ausculation bilaterally  GI:  [x ] soft, non-tender, normal bowel sounds  :  [ ] james [x ] no CVA tenderness   Musculoskeletal:  [ ] no synovitis  Neurologic:  [x ] non-focal exam   Skin:  [ x] no rash  Lymph: [ ] no lymphadenopathy  Psychiatric:  [ ] appropriate affect [ ] alert & oriented  Lines:  [x ] no phlebitis [ ] central line                                7.4    6.57  )-----------( 244      ( 22 Jun 2018 08:10 )             24.1       06-22    138  |  93<L>  |  31<H>  ----------------------------<  149<H>  3.6   |  28  |  4.68<H>    Ca    9.3      22 Jun 2018 06:30  Mg     2.3     06-22            MICROBIOLOGY:    RADIOLOGY:  < from: CT Abdomen and Pelvis w/ IV Cont (06.22.18 @ 10:41) >  IMPRESSION:  Atrophic kidneys with cortical scarring and heterogeneous right renal   enhancement, question pyelonephritis. Correlate with urinalysis/culture.    Circumferential mild rectal wall thickening. Correlate for signs of   proctitis.    Bladder stone.    Bilateral pleural effusions    < end of copied text >

## 2018-06-22 NOTE — PROGRESS NOTE ADULT - SUBJECTIVE AND OBJECTIVE BOX
CARDIOLOGY FOLLOW UP - Dr. Yesenia BETANCOURT breathing improved, resting comfortably       PHYSICAL EXAM:  T(C): 36.7 (06-22-18 @ 05:30), Max: 36.8 (06-21-18 @ 17:50)  HR: 79 (06-22-18 @ 11:44) (72 - 105)  BP: 117/60 (06-22-18 @ 05:30) (102/53 - 145/78)  RR: 16 (06-22-18 @ 05:30) (16 - 25)  SpO2: 98% (06-22-18 @ 11:44) (98% - 100%)  Wt(kg): --  I&O's Summary    21 Jun 2018 07:01  -  22 Jun 2018 07:00  --------------------------------------------------------  IN: 520 mL / OUT: 1700 mL / NET: -1180 mL    22 Jun 2018 07:01  -  22 Jun 2018 12:47  --------------------------------------------------------  IN: 120 mL / OUT: 0 mL / NET: 120 mL        Appearance: Normal	  Cardiovascular: Normal S1 S2,RRR, No JVD, No murmurs  Respiratory: Lungs clear to auscultation	  Gastrointestinal:  Soft, Non-tender, + BS	  Extremities: Normal range of motion, No clubbing, cyanosis or edema        MEDICATIONS  (STANDING):  ALBUTerol/ipratropium for Nebulization 3 milliLiter(s) Nebulizer every 6 hours  amiodarone    Tablet 200 milliGRAM(s) Oral daily  artificial  tears Solution 1 Drop(s) Both EYES four times a day  aspirin  chewable 81 milliGRAM(s) Oral daily  atorvastatin 40 milliGRAM(s) Oral at bedtime  busPIRone 5 milliGRAM(s) Oral two times a day  clopidogrel Tablet 75 milliGRAM(s) Oral daily  dextrose 50% Injectable 12.5 Gram(s) IV Push once  dextrose 50% Injectable 25 Gram(s) IV Push once  dextrose 50% Injectable 25 Gram(s) IV Push once  insulin lispro (HumaLOG) corrective regimen sliding scale   SubCutaneous three times a day before meals  insulin lispro (HumaLOG) corrective regimen sliding scale   SubCutaneous at bedtime  metoprolol tartrate 25 milliGRAM(s) Oral two times a day  multivitamin 1 Tablet(s) Oral daily  pantoprazole    Tablet 40 milliGRAM(s) Oral before breakfast  piperacillin/tazobactam IVPB. 3.375 Gram(s) IV Intermittent every 12 hours  tamsulosin 0.4 milliGRAM(s) Oral at bedtime      TELEMETRY: 	SR 1st degree AVB     ECG:  	  RADIOLOGY:   DIAGNOSTIC TESTING:  [ ] Echocardiogram:  [ ]  Catheterization:  [ ] Stress Test:    OTHER: 	  < from: Xray Chest 1 View- PORTABLE-Urgent (06.21.18 @ 14:30) >  IMPRESSION:    Pulmonary edema with interval progression.      < end of copied text >    LABS:	 	                                7.4    6.57  )-----------( 244      ( 22 Jun 2018 08:10 )             24.1     06-22    138  |  93<L>  |  31<H>  ----------------------------<  149<H>  3.6   |  28  |  4.68<H>    Ca    9.3      22 Jun 2018 06:30  Mg     2.3     06-22      PT/INR - ( 21 Jun 2018 13:35 )   PT: 23.5 SEC;   INR: 2.01          PTT - ( 21 Jun 2018 13:35 )  PTT:37.4 SEC

## 2018-06-22 NOTE — PROGRESS NOTE ADULT - ASSESSMENT
71 y/o M with CAD s/p 12-14 stents placed in 1990's, and BABAR x 5 placed in Sept 2017, CHF with EF 20%, DM, HTN, HLD, AF on coumadin, CVA , ESRD on HD (MWF) COPD, bipolar disorder, dementia admitted for SOB secondary to fluid overload.

## 2018-06-22 NOTE — PROGRESS NOTE ADULT - PROBLEM SELECTOR PLAN 1
cont present abx   < from: CT Abdomen and Pelvis w/ IV Cont (06.22.18 @ 10:41) >    Atrophic kidneys with cortical scarring and heterogeneous right renal   enhancement, question pyelonephritis. Correlate with urinalysis/culture.    Circumferential mild rectal wall thickening. Correlate for signs of   proctitis.Bladder stone.Bilateral pleural effusions.

## 2018-06-22 NOTE — PROGRESS NOTE ADULT - ASSESSMENT
Echo - 9/17 - EF severely decreased    a/p     1) Acute on chronic systolic CHF - pt. on HD, CXR w/ pulm edema, s/p extra dialysis session yest, sob improved, abg noted, repeat echo, pulm consult pending  mild troponin elevation likely sec to ESRD and CHF    2) CAD s/p PCI - on ASA and plavix     3) ESRD on HD    4) S/P code sepsis - UTI vs. PNA, ID following, on iv abx, blood cx gram + cocci in chains, ua/urine cx pending.

## 2018-06-22 NOTE — PROGRESS NOTE ADULT - ASSESSMENT
72 year old with esrd and CHF presents with shortness of breath and hypoxia.   HD via AV fistula  He improved after diuresis with furosemide.  Had leukocytosis and fever.    No with enterococcal bacteremia  CT with ? pyelo.   Although he is ESRd, he had a chronic james at presentation.  Change to ampicillin 2 gm iv q 12    Check echo.    If echo is okay and repeat blood cultures are negative, plan for 14 days of abx with ampicillin through 7/4/2018,    I will be away as of 6/23.  ID staff to follow. Call [phone] with issues/ concerns.

## 2018-06-22 NOTE — PROGRESS NOTE ADULT - SUBJECTIVE AND OBJECTIVE BOX
chief complaint : dyspnea        SUBJECTIVE / OVERNIGHT EVENTS: pt denies chest pain, shortness of breath, nausea,v      MEDICATIONS  (STANDING):  ALBUTerol/ipratropium for Nebulization 3 milliLiter(s) Nebulizer every 6 hours  amiodarone    Tablet 200 milliGRAM(s) Oral daily  ampicillin  IVPB 2 Gram(s) IV Intermittent every 12 hours  artificial  tears Solution 1 Drop(s) Both EYES four times a day  aspirin  chewable 81 milliGRAM(s) Oral daily  atorvastatin 40 milliGRAM(s) Oral at bedtime  busPIRone 5 milliGRAM(s) Oral two times a day  clopidogrel Tablet 75 milliGRAM(s) Oral daily  dextrose 50% Injectable 12.5 Gram(s) IV Push once  dextrose 50% Injectable 25 Gram(s) IV Push once  dextrose 50% Injectable 25 Gram(s) IV Push once  insulin lispro (HumaLOG) corrective regimen sliding scale   SubCutaneous three times a day before meals  insulin lispro (HumaLOG) corrective regimen sliding scale   SubCutaneous at bedtime  metoprolol tartrate 25 milliGRAM(s) Oral two times a day  multivitamin 1 Tablet(s) Oral daily  pantoprazole    Tablet 40 milliGRAM(s) Oral before breakfast  tamsulosin 0.4 milliGRAM(s) Oral at bedtime    MEDICATIONS  (PRN):  acetaminophen  Suppository 650 milliGRAM(s) Rectal every 6 hours PRN For Temp greater than 38 C (100.4 F)  dextrose 40% Gel 15 Gram(s) Oral once PRN Blood Glucose LESS THAN 70 milliGRAM(s)/deciliter  glucagon  Injectable 1 milliGRAM(s) IntraMuscular once PRN Glucose LESS THAN 70 milligrams/deciliter    Vital Signs Last 24 Hrs  T(C): 37.5 (22 Jun 2018 13:47), Max: 37.5 (22 Jun 2018 13:47)  T(F): 99.5 (22 Jun 2018 13:47), Max: 99.5 (22 Jun 2018 13:47)  HR: 73 (22 Jun 2018 21:14) (73 - 83)  BP: 122/63 (22 Jun 2018 17:31) (117/60 - 125/70)  BP(mean): --  RR: 16 (22 Jun 2018 13:47) (16 - 16)  SpO2: 97% (22 Jun 2018 21:14) (95% - 100%)    Constitutional: No fever, fatigue  Skin: No rash.  Eyes: No recent vision problems or eye pain.  ENT: No congestion, ear pain, or sore throat.  Cardiovascular: No chest pain or palpation.  Respiratory: No cough, shortness of breath, congestion, or wheezing.  Gastrointestinal: No abdominal pain, nausea, vomiting, or diarrhea.  Genitourinary: No dysuria.  Musculoskeletal: No joint swelling.  Neurologic: No headache.    PHYSICAL EXAM:  GENERAL: NAD  EYES: EOMI, PERRLA  NECK: Supple, No JVD  CHEST/LUNG: dec breath sounds at bases, fine exp wheezing+  HEART:  S1 , S2 +  ABDOMEN: soft bs+  EXTREMITIES:  trace edema+  NEUROLOGY:alert awake       LABS:  06-22    138  |  93<L>  |  31<H>  ----------------------------<  149<H>  3.6   |  28  |  4.68<H>    Ca    9.3      22 Jun 2018 06:30  Mg     2.3     06-22      Creatinine Trend: 4.68 <--, 6.67 <--, 6.08 <--, 4.72 <--, 4.12 <--, 7.78 <--                        7.4    6.57  )-----------( 244      ( 22 Jun 2018 08:10 )             24.1     Urine Studies:          ABG - ( 21 Jun 2018 13:35 )  pH, Arterial: 7.45  pH, Blood: x     /  pCO2: 36    /  pO2: 129   / HCO3: 25    / Base Excess: 0.8   /  SaO2: 96.9                PT/INR - ( 21 Jun 2018 13:35 )   PT: 23.5 SEC;   INR: 2.01          PTT - ( 21 Jun 2018 13:35 )  PTT:37.4 SEC

## 2018-06-23 LAB
BASOPHILS # BLD AUTO: 0.02 K/UL — SIGNIFICANT CHANGE UP (ref 0–0.2)
BASOPHILS NFR BLD AUTO: 0.3 % — SIGNIFICANT CHANGE UP (ref 0–2)
BUN SERPL-MCNC: 41 MG/DL — HIGH (ref 7–23)
CALCIUM SERPL-MCNC: 8.8 MG/DL — SIGNIFICANT CHANGE UP (ref 8.4–10.5)
CHLORIDE SERPL-SCNC: 93 MMOL/L — LOW (ref 98–107)
CO2 SERPL-SCNC: 23 MMOL/L — SIGNIFICANT CHANGE UP (ref 22–31)
CREAT SERPL-MCNC: 6.21 MG/DL — HIGH (ref 0.5–1.3)
EOSINOPHIL # BLD AUTO: 0.14 K/UL — SIGNIFICANT CHANGE UP (ref 0–0.5)
EOSINOPHIL NFR BLD AUTO: 2.3 % — SIGNIFICANT CHANGE UP (ref 0–6)
GLUCOSE BLDC GLUCOMTR-MCNC: 143 MG/DL — HIGH (ref 70–99)
GLUCOSE BLDC GLUCOMTR-MCNC: 167 MG/DL — HIGH (ref 70–99)
GLUCOSE BLDC GLUCOMTR-MCNC: 190 MG/DL — HIGH (ref 70–99)
GLUCOSE BLDC GLUCOMTR-MCNC: 216 MG/DL — HIGH (ref 70–99)
GLUCOSE BLDC GLUCOMTR-MCNC: 325 MG/DL — HIGH (ref 70–99)
GLUCOSE BLDC GLUCOMTR-MCNC: 338 MG/DL — HIGH (ref 70–99)
GLUCOSE SERPL-MCNC: 190 MG/DL — HIGH (ref 70–99)
HCT VFR BLD CALC: 23.4 % — LOW (ref 39–50)
HGB BLD-MCNC: 7.1 G/DL — LOW (ref 13–17)
IMM GRANULOCYTES # BLD AUTO: 0.02 # — SIGNIFICANT CHANGE UP
IMM GRANULOCYTES NFR BLD AUTO: 0.3 % — SIGNIFICANT CHANGE UP (ref 0–1.5)
LYMPHOCYTES # BLD AUTO: 0.94 K/UL — LOW (ref 1–3.3)
LYMPHOCYTES # BLD AUTO: 15.5 % — SIGNIFICANT CHANGE UP (ref 13–44)
MAGNESIUM SERPL-MCNC: 2.2 MG/DL — SIGNIFICANT CHANGE UP (ref 1.6–2.6)
MCHC RBC-ENTMCNC: 28 PG — SIGNIFICANT CHANGE UP (ref 27–34)
MCHC RBC-ENTMCNC: 30.3 % — LOW (ref 32–36)
MCV RBC AUTO: 92.1 FL — SIGNIFICANT CHANGE UP (ref 80–100)
MONOCYTES # BLD AUTO: 0.39 K/UL — SIGNIFICANT CHANGE UP (ref 0–0.9)
MONOCYTES NFR BLD AUTO: 6.4 % — SIGNIFICANT CHANGE UP (ref 2–14)
NEUTROPHILS # BLD AUTO: 4.56 K/UL — SIGNIFICANT CHANGE UP (ref 1.8–7.4)
NEUTROPHILS NFR BLD AUTO: 75.2 % — SIGNIFICANT CHANGE UP (ref 43–77)
NRBC # FLD: 0 — SIGNIFICANT CHANGE UP
PLATELET # BLD AUTO: 226 K/UL — SIGNIFICANT CHANGE UP (ref 150–400)
PMV BLD: 10.4 FL — SIGNIFICANT CHANGE UP (ref 7–13)
POTASSIUM SERPL-MCNC: 3.4 MMOL/L — LOW (ref 3.5–5.3)
POTASSIUM SERPL-SCNC: 3.4 MMOL/L — LOW (ref 3.5–5.3)
RBC # BLD: 2.54 M/UL — LOW (ref 4.2–5.8)
RBC # FLD: 14.9 % — HIGH (ref 10.3–14.5)
SODIUM SERPL-SCNC: 137 MMOL/L — SIGNIFICANT CHANGE UP (ref 135–145)
VANCOMYCIN FLD-MCNC: 16.1 UG/ML — SIGNIFICANT CHANGE UP
WBC # BLD: 6.07 K/UL — SIGNIFICANT CHANGE UP (ref 3.8–10.5)
WBC # FLD AUTO: 6.07 K/UL — SIGNIFICANT CHANGE UP (ref 3.8–10.5)

## 2018-06-23 PROCEDURE — 90935 HEMODIALYSIS ONE EVALUATION: CPT | Mod: GC

## 2018-06-23 RX ORDER — ERYTHROPOIETIN 10000 [IU]/ML
4000 INJECTION, SOLUTION INTRAVENOUS; SUBCUTANEOUS
Qty: 0 | Refills: 0 | Status: DISCONTINUED | OUTPATIENT
Start: 2018-06-23 | End: 2018-07-06

## 2018-06-23 RX ADMIN — Medication 3 MILLILITER(S): at 15:46

## 2018-06-23 RX ADMIN — Medication 216 GRAM(S): at 05:38

## 2018-06-23 RX ADMIN — CLOPIDOGREL BISULFATE 75 MILLIGRAM(S): 75 TABLET, FILM COATED ORAL at 11:44

## 2018-06-23 RX ADMIN — Medication 1 DROP(S): at 17:27

## 2018-06-23 RX ADMIN — Medication 2: at 17:29

## 2018-06-23 RX ADMIN — ATORVASTATIN CALCIUM 40 MILLIGRAM(S): 80 TABLET, FILM COATED ORAL at 21:37

## 2018-06-23 RX ADMIN — Medication 81 MILLIGRAM(S): at 11:44

## 2018-06-23 RX ADMIN — Medication 1 DROP(S): at 05:38

## 2018-06-23 RX ADMIN — PANTOPRAZOLE SODIUM 40 MILLIGRAM(S): 20 TABLET, DELAYED RELEASE ORAL at 05:37

## 2018-06-23 RX ADMIN — AMIODARONE HYDROCHLORIDE 200 MILLIGRAM(S): 400 TABLET ORAL at 05:37

## 2018-06-23 RX ADMIN — Medication 5 MILLIGRAM(S): at 17:26

## 2018-06-23 RX ADMIN — Medication 650 MILLIGRAM(S): at 17:23

## 2018-06-23 RX ADMIN — Medication 4: at 13:15

## 2018-06-23 RX ADMIN — TAMSULOSIN HYDROCHLORIDE 0.4 MILLIGRAM(S): 0.4 CAPSULE ORAL at 21:37

## 2018-06-23 RX ADMIN — Medication 1 TABLET(S): at 11:44

## 2018-06-23 RX ADMIN — Medication 1 DROP(S): at 11:51

## 2018-06-23 RX ADMIN — Medication 216 GRAM(S): at 17:25

## 2018-06-23 RX ADMIN — Medication 3 MILLILITER(S): at 04:16

## 2018-06-23 RX ADMIN — Medication 25 MILLIGRAM(S): at 17:25

## 2018-06-23 RX ADMIN — Medication 5 MILLIGRAM(S): at 05:37

## 2018-06-23 RX ADMIN — Medication 1 DROP(S): at 21:38

## 2018-06-23 RX ADMIN — Medication 2: at 22:41

## 2018-06-23 RX ADMIN — Medication 3 MILLILITER(S): at 20:48

## 2018-06-23 NOTE — PROGRESS NOTE ADULT - SUBJECTIVE AND OBJECTIVE BOX
CARDIOLOGY FOLLOW UP - Dr. Patterson ( for dr. Martinez)     CC no chest pain or sob       PHYSICAL EXAM:  T(C): 36.8 (06-23-18 @ 09:30), Max: 37.5 (06-22-18 @ 13:47)  HR: 78 (06-23-18 @ 11:33) (73 - 96)  BP: 108/45 (06-23-18 @ 09:30) (108/45 - 129/84)  RR: 18 (06-23-18 @ 09:30) (16 - 19)  SpO2: 96% (06-23-18 @ 11:33) (95% - 100%)  Wt(kg): --  I&O's Summary    22 Jun 2018 07:01  -  23 Jun 2018 07:00  --------------------------------------------------------  IN: 320 mL / OUT: 0 mL / NET: 320 mL    23 Jun 2018 07:01  -  23 Jun 2018 11:51  --------------------------------------------------------  IN: 400 mL / OUT: 1900 mL / NET: -1500 mL        Appearance: Normal	  Cardiovascular: Normal S1 S2,RRR, No JVD, No murmurs  Respiratory: diminished   Gastrointestinal:  Soft, Non-tender, + BS	  Extremities: Normal range of motion, No clubbing, cyanosis or edema        MEDICATIONS  (STANDING):  ALBUTerol/ipratropium for Nebulization 3 milliLiter(s) Nebulizer every 6 hours  amiodarone    Tablet 200 milliGRAM(s) Oral daily  ampicillin  IVPB 2 Gram(s) IV Intermittent every 12 hours  artificial  tears Solution 1 Drop(s) Both EYES four times a day  aspirin  chewable 81 milliGRAM(s) Oral daily  atorvastatin 40 milliGRAM(s) Oral at bedtime  busPIRone 5 milliGRAM(s) Oral two times a day  clopidogrel Tablet 75 milliGRAM(s) Oral daily  dextrose 50% Injectable 12.5 Gram(s) IV Push once  dextrose 50% Injectable 25 Gram(s) IV Push once  dextrose 50% Injectable 25 Gram(s) IV Push once  insulin lispro (HumaLOG) corrective regimen sliding scale   SubCutaneous three times a day before meals  insulin lispro (HumaLOG) corrective regimen sliding scale   SubCutaneous at bedtime  metoprolol tartrate 25 milliGRAM(s) Oral two times a day  multivitamin 1 Tablet(s) Oral daily  pantoprazole    Tablet 40 milliGRAM(s) Oral before breakfast  tamsulosin 0.4 milliGRAM(s) Oral at bedtime      TELEMETRY: 	NSR     ECG:  	  RADIOLOGY:   DIAGNOSTIC TESTING:  [ ] Echocardiogram:    [ ]  Catheterization:  [ ] Stress Test:    OTHER: 	    LABS:	 	                                7.1    6.07  )-----------( 226      ( 23 Jun 2018 06:45 )             23.4     06-23    137  |  93<L>  |  41<H>  ----------------------------<  190<H>  3.4<L>   |  23  |  6.21<H>    Ca    8.8      23 Jun 2018 06:45  Mg     2.2     06-23      PT/INR - ( 21 Jun 2018 13:35 )   PT: 23.5 SEC;   INR: 2.01          PTT - ( 21 Jun 2018 13:35 )  PTT:37.4 SEC

## 2018-06-23 NOTE — PROGRESS NOTE ADULT - PROBLEM SELECTOR PLAN 1
Patient with h/o ESRD on HD; Pt seen on dialysis tolerating well. aVF working well.  Monitor BMP and BP

## 2018-06-23 NOTE — PROGRESS NOTE ADULT - ASSESSMENT
Echo - 9/17 - EF severely decreased  CXR w/ pulm edema    a/p     1) Acute on chronic systolic CHF - fluid removal with HD, SOB improving, pending repeat echo, pulm consult pending  mild troponin elevation likely sec to ESRD and CHF. cont with BB . no acei or arb due to borderline low bp    2) CAD s/p PCI - on ASA and plavix     3) ESRD on HD    4) S/P code sepsis - ID following, on iv abx, blood cx gram + cocci in chains . pending echo

## 2018-06-23 NOTE — PROGRESS NOTE ADULT - SUBJECTIVE AND OBJECTIVE BOX
[facility] DIVISION OF KIDNEY DISEASES AND HYPERTENSION -- HEMODIALYSIS NOTE  [phone]--------------------------------------------------------------------------------  Chief Complaint: ESRD/Ongoing hemodialysis requirement    24 hour events/subjective:  Patient seen and examined during HD , tolerating well.       PAST HISTORY  --------------------------------------------------------------------------------  No significant changes to PMH, PSH, FHx, SHx, unless otherwise noted    ALLERGIES & MEDICATIONS  --------------------------------------------------------------------------------  Allergies    No Known Allergies    Intolerances      Standing Inpatient Medications  ALBUTerol/ipratropium for Nebulization 3 milliLiter(s) Nebulizer every 6 hours  amiodarone    Tablet 200 milliGRAM(s) Oral daily  ampicillin  IVPB 2 Gram(s) IV Intermittent every 12 hours  artificial  tears Solution 1 Drop(s) Both EYES four times a day  aspirin  chewable 81 milliGRAM(s) Oral daily  atorvastatin 40 milliGRAM(s) Oral at bedtime  busPIRone 5 milliGRAM(s) Oral two times a day  clopidogrel Tablet 75 milliGRAM(s) Oral daily  dextrose 50% Injectable 12.5 Gram(s) IV Push once  dextrose 50% Injectable 25 Gram(s) IV Push once  dextrose 50% Injectable 25 Gram(s) IV Push once  insulin lispro (HumaLOG) corrective regimen sliding scale   SubCutaneous three times a day before meals  insulin lispro (HumaLOG) corrective regimen sliding scale   SubCutaneous at bedtime  metoprolol tartrate 25 milliGRAM(s) Oral two times a day  multivitamin 1 Tablet(s) Oral daily  pantoprazole    Tablet 40 milliGRAM(s) Oral before breakfast  tamsulosin 0.4 milliGRAM(s) Oral at bedtime    PRN Inpatient Medications  acetaminophen  Suppository 650 milliGRAM(s) Rectal every 6 hours PRN  dextrose 40% Gel 15 Gram(s) Oral once PRN  glucagon  Injectable 1 milliGRAM(s) IntraMuscular once PRN      REVIEW OF SYSTEMS  --------------------------------------------------------------------------------  As above.    VITALS/PHYSICAL EXAM  --------------------------------------------------------------------------------  T(C): 36.8 (06-23-18 @ 09:30), Max: 37.5 (06-22-18 @ 13:47)  HR: 78 (06-23-18 @ 11:33) (73 - 96)  BP: 108/45 (06-23-18 @ 09:30) (108/45 - 129/84)  RR: 18 (06-23-18 @ 09:30) (16 - 19)  SpO2: 96% (06-23-18 @ 11:33) (95% - 100%)  Wt(kg): --        06-22-18 @ 07:01  -  06-23-18 @ 07:00  --------------------------------------------------------  IN: 320 mL / OUT: 0 mL / NET: 320 mL    06-23-18 @ 07:01  -  06-23-18 @ 12:05  --------------------------------------------------------  IN: 400 mL / OUT: 1900 mL / NET: -1500 mL      Physical Exam:  	  Gen: Elderly male, NAD  	HEENT: anicteric  	Pulm: Rales present over both lungs.   	CV:  S1S2 rrr  	Abd: +BS, soft   	Ext: No B/L Lower ext edema  	Neuro: minimally verbal, follows commands  	Skin: Warm, without rashes  	Vascular access: Right UE AVF connected to dialysis machine  LABS/STUDIES  --------------------------------------------------------------------------------              7.1    6.07  >-----------<  226      [06-23-18 @ 06:45]              23.4     137  |  93  |  41  ----------------------------<  190      [06-23-18 @ 06:45]  3.4   |  23  |  6.21        Ca     8.8     [06-23-18 @ 06:45]      Mg     2.2     [06-23-18 @ 06:45]      PT/INR: PT 23.5 , INR 2.01       [06-21-18 @ 13:35]  PTT: 37.4       [06-21-18 @ 13:35]      Iron 41, TIBC 157, %sat --      [06-20-18 @ 06:00]  Ferritin 2578      [06-20-18 @ 06:00]  PTH 76.54 (Ca --)      [06-20-18 @ 06:00]   --  PTH -- (Ca 8.5)      [09-08-17 @ 09:29]   156  HbA1c 6.6      [06-20-18 @ 06:00]  TSH 5.93      [10-21-17 @ 09:04]  Lipid: chol 105, , HDL 35, LDL 35      [09-27-17 @ 07:40]    HBsAb <3.0      [06-19-18 @ 17:05]  HBsAg NEGATIVE      [06-19-18 @ 17:05]  HBcAb Nonreactive      [06-19-18 @ 17:05]  HCV 0.35, Nonreactive Hepatitis C AB  S/CO Ratio                        Interpretation  < 1.0                                     Non-Reactive  1.0 - 4.9                           Weakly-Reactive  > 5.0                                 Reactive  Non-Reactive: Aperson with a non-reactive HCV antibody  result is considered uninfected.  No further action is  needed unless recent infection is suspected.  In these  cases, consider repeat testing later to detect  seroconversion..  Weakly-Reactive: HCV antibody test is abnormal, HCV RNA  Qualitative test will follow.  Reactive: HCV antibody test is abnormal, HCV RNA  Qualitative test will follow.  Note: HCV antibody testing is performed on the [employer]   system.      [06-19-18 @ 17:05]

## 2018-06-23 NOTE — PROGRESS NOTE ADULT - SUBJECTIVE AND OBJECTIVE BOX
chief complaint : dyspnea        SUBJECTIVE / OVERNIGHT EVENTS: pt denies chest pain, shortness of breath, nausea,v      MEDICATIONS  (STANDING):  ALBUTerol/ipratropium for Nebulization 3 milliLiter(s) Nebulizer every 6 hours  amiodarone    Tablet 200 milliGRAM(s) Oral daily  ampicillin  IVPB 2 Gram(s) IV Intermittent every 12 hours  artificial  tears Solution 1 Drop(s) Both EYES four times a day  aspirin  chewable 81 milliGRAM(s) Oral daily  atorvastatin 40 milliGRAM(s) Oral at bedtime  busPIRone 5 milliGRAM(s) Oral two times a day  clopidogrel Tablet 75 milliGRAM(s) Oral daily  dextrose 50% Injectable 12.5 Gram(s) IV Push once  dextrose 50% Injectable 25 Gram(s) IV Push once  dextrose 50% Injectable 25 Gram(s) IV Push once  epoetin georgie Injectable 4000 Unit(s) IV Push <User Schedule>  insulin lispro (HumaLOG) corrective regimen sliding scale   SubCutaneous three times a day before meals  insulin lispro (HumaLOG) corrective regimen sliding scale   SubCutaneous at bedtime  metoprolol tartrate 25 milliGRAM(s) Oral two times a day  multivitamin 1 Tablet(s) Oral daily  pantoprazole    Tablet 40 milliGRAM(s) Oral before breakfast  tamsulosin 0.4 milliGRAM(s) Oral at bedtime    MEDICATIONS  (PRN):  acetaminophen  Suppository 650 milliGRAM(s) Rectal every 6 hours PRN For Temp greater than 38 C (100.4 F)  dextrose 40% Gel 15 Gram(s) Oral once PRN Blood Glucose LESS THAN 70 milliGRAM(s)/deciliter  glucagon  Injectable 1 milliGRAM(s) IntraMuscular once PRN Glucose LESS THAN 70 milligrams/deciliter    Vital Signs Last 24 Hrs  T(C): 37.5 (23 Jun 2018 16:41), Max: 37.5 (23 Jun 2018 14:43)  T(F): 99.5 (23 Jun 2018 16:41), Max: 99.5 (23 Jun 2018 14:43)  HR: 90 (23 Jun 2018 16:41) (73 - 96)  BP: 131/68 (23 Jun 2018 14:43) (108/45 - 131/68)  BP(mean): --  RR: 17 (23 Jun 2018 16:41) (17 - 19)  SpO2: 100% (23 Jun 2018 16:41) (95% - 100%)    Constitutional: No fever, fatigue  Skin: No rash.  Eyes: No recent vision problems or eye pain.  ENT: No congestion, ear pain, or sore throat.  Cardiovascular: No chest pain or palpation.  Respiratory: No cough, shortness of breath, congestion, or wheezing.  Gastrointestinal: No abdominal pain, nausea, vomiting, or diarrhea.  Genitourinary: No dysuria.  Musculoskeletal: No joint swelling.  Neurologic: No headache.    PHYSICAL EXAM:  GENERAL: NAD  EYES: EOMI, PERRLA  NECK: Supple, No JVD  CHEST/LUNG: dec breath sounds at bases, fine exp wheezing+  HEART:  S1 , S2 +  ABDOMEN: soft bs+  EXTREMITIES:  trace edema+  NEUROLOGY:alert awake       LABS:  06-23    137  |  93<L>  |  41<H>  ----------------------------<  190<H>  3.4<L>   |  23  |  6.21<H>    Ca    8.8      23 Jun 2018 06:45  Mg     2.2     06-23      Creatinine Trend: 6.21 <--, 4.68 <--, 6.67 <--, 6.08 <--, 4.72 <--, 4.12 <--, 7.78 <--                        7.1    6.07  )-----------( 226      ( 23 Jun 2018 06:45 )             23.4     Urine Studies:

## 2018-06-24 LAB
BACTERIA BLD CULT: SIGNIFICANT CHANGE UP
BACTERIA BLD CULT: SIGNIFICANT CHANGE UP
BASOPHILS # BLD AUTO: 0.03 K/UL — SIGNIFICANT CHANGE UP (ref 0–0.2)
BASOPHILS NFR BLD AUTO: 0.5 % — SIGNIFICANT CHANGE UP (ref 0–2)
BUN SERPL-MCNC: 26 MG/DL — HIGH (ref 7–23)
CALCIUM SERPL-MCNC: 8.8 MG/DL — SIGNIFICANT CHANGE UP (ref 8.4–10.5)
CHLORIDE SERPL-SCNC: 95 MMOL/L — LOW (ref 98–107)
CO2 SERPL-SCNC: 26 MMOL/L — SIGNIFICANT CHANGE UP (ref 22–31)
CREAT SERPL-MCNC: 4.44 MG/DL — HIGH (ref 0.5–1.3)
EOSINOPHIL # BLD AUTO: 0.24 K/UL — SIGNIFICANT CHANGE UP (ref 0–0.5)
EOSINOPHIL NFR BLD AUTO: 3.7 % — SIGNIFICANT CHANGE UP (ref 0–6)
GLUCOSE BLDC GLUCOMTR-MCNC: 134 MG/DL — HIGH (ref 70–99)
GLUCOSE BLDC GLUCOMTR-MCNC: 166 MG/DL — HIGH (ref 70–99)
GLUCOSE BLDC GLUCOMTR-MCNC: 181 MG/DL — HIGH (ref 70–99)
GLUCOSE BLDC GLUCOMTR-MCNC: 210 MG/DL — HIGH (ref 70–99)
GLUCOSE SERPL-MCNC: 157 MG/DL — HIGH (ref 70–99)
HCT VFR BLD CALC: 23.9 % — LOW (ref 39–50)
HGB BLD-MCNC: 7.1 G/DL — LOW (ref 13–17)
IMM GRANULOCYTES # BLD AUTO: 0.02 # — SIGNIFICANT CHANGE UP
IMM GRANULOCYTES NFR BLD AUTO: 0.3 % — SIGNIFICANT CHANGE UP (ref 0–1.5)
LYMPHOCYTES # BLD AUTO: 1.84 K/UL — SIGNIFICANT CHANGE UP (ref 1–3.3)
LYMPHOCYTES # BLD AUTO: 28.3 % — SIGNIFICANT CHANGE UP (ref 13–44)
MAGNESIUM SERPL-MCNC: 2.2 MG/DL — SIGNIFICANT CHANGE UP (ref 1.6–2.6)
MCHC RBC-ENTMCNC: 27.7 PG — SIGNIFICANT CHANGE UP (ref 27–34)
MCHC RBC-ENTMCNC: 29.7 % — LOW (ref 32–36)
MCV RBC AUTO: 93.4 FL — SIGNIFICANT CHANGE UP (ref 80–100)
MONOCYTES # BLD AUTO: 0.55 K/UL — SIGNIFICANT CHANGE UP (ref 0–0.9)
MONOCYTES NFR BLD AUTO: 8.4 % — SIGNIFICANT CHANGE UP (ref 2–14)
NEUTROPHILS # BLD AUTO: 3.83 K/UL — SIGNIFICANT CHANGE UP (ref 1.8–7.4)
NEUTROPHILS NFR BLD AUTO: 58.8 % — SIGNIFICANT CHANGE UP (ref 43–77)
NRBC # FLD: 0 — SIGNIFICANT CHANGE UP
PLATELET # BLD AUTO: 257 K/UL — SIGNIFICANT CHANGE UP (ref 150–400)
PMV BLD: 10.2 FL — SIGNIFICANT CHANGE UP (ref 7–13)
POTASSIUM SERPL-MCNC: 3.7 MMOL/L — SIGNIFICANT CHANGE UP (ref 3.5–5.3)
POTASSIUM SERPL-SCNC: 3.7 MMOL/L — SIGNIFICANT CHANGE UP (ref 3.5–5.3)
RBC # BLD: 2.56 M/UL — LOW (ref 4.2–5.8)
RBC # FLD: 15.1 % — HIGH (ref 10.3–14.5)
SODIUM SERPL-SCNC: 137 MMOL/L — SIGNIFICANT CHANGE UP (ref 135–145)
SPECIMEN SOURCE: SIGNIFICANT CHANGE UP
SPECIMEN SOURCE: SIGNIFICANT CHANGE UP
WBC # BLD: 6.51 K/UL — SIGNIFICANT CHANGE UP (ref 3.8–10.5)
WBC # FLD AUTO: 6.51 K/UL — SIGNIFICANT CHANGE UP (ref 3.8–10.5)

## 2018-06-24 PROCEDURE — 71045 X-RAY EXAM CHEST 1 VIEW: CPT | Mod: 26

## 2018-06-24 PROCEDURE — 93306 TTE W/DOPPLER COMPLETE: CPT | Mod: 26

## 2018-06-24 RX ORDER — HEPARIN SODIUM 5000 [USP'U]/ML
INJECTION INTRAVENOUS; SUBCUTANEOUS
Qty: 25000 | Refills: 0 | Status: DISCONTINUED | OUTPATIENT
Start: 2018-06-24 | End: 2018-06-30

## 2018-06-24 RX ORDER — HEPARIN SODIUM 5000 [USP'U]/ML
2500 INJECTION INTRAVENOUS; SUBCUTANEOUS EVERY 6 HOURS
Qty: 0 | Refills: 0 | Status: DISCONTINUED | OUTPATIENT
Start: 2018-06-24 | End: 2018-06-30

## 2018-06-24 RX ORDER — HEPARIN SODIUM 5000 [USP'U]/ML
5000 INJECTION INTRAVENOUS; SUBCUTANEOUS EVERY 6 HOURS
Qty: 0 | Refills: 0 | Status: DISCONTINUED | OUTPATIENT
Start: 2018-06-24 | End: 2018-06-30

## 2018-06-24 RX ORDER — IPRATROPIUM/ALBUTEROL SULFATE 18-103MCG
3 AEROSOL WITH ADAPTER (GRAM) INHALATION ONCE
Qty: 0 | Refills: 0 | Status: COMPLETED | OUTPATIENT
Start: 2018-06-24 | End: 2018-06-24

## 2018-06-24 RX ADMIN — Medication 216 GRAM(S): at 19:40

## 2018-06-24 RX ADMIN — Medication 216 GRAM(S): at 05:41

## 2018-06-24 RX ADMIN — CLOPIDOGREL BISULFATE 75 MILLIGRAM(S): 75 TABLET, FILM COATED ORAL at 13:39

## 2018-06-24 RX ADMIN — Medication 1 TABLET(S): at 13:39

## 2018-06-24 RX ADMIN — Medication 1: at 09:16

## 2018-06-24 RX ADMIN — ATORVASTATIN CALCIUM 40 MILLIGRAM(S): 80 TABLET, FILM COATED ORAL at 23:35

## 2018-06-24 RX ADMIN — Medication 25 MILLIGRAM(S): at 05:41

## 2018-06-24 RX ADMIN — Medication 1 DROP(S): at 13:40

## 2018-06-24 RX ADMIN — Medication 3 MILLILITER(S): at 22:10

## 2018-06-24 RX ADMIN — Medication 1 DROP(S): at 05:41

## 2018-06-24 RX ADMIN — Medication 2: at 13:38

## 2018-06-24 RX ADMIN — PANTOPRAZOLE SODIUM 40 MILLIGRAM(S): 20 TABLET, DELAYED RELEASE ORAL at 05:42

## 2018-06-24 RX ADMIN — TAMSULOSIN HYDROCHLORIDE 0.4 MILLIGRAM(S): 0.4 CAPSULE ORAL at 23:35

## 2018-06-24 RX ADMIN — Medication 5 MILLIGRAM(S): at 23:35

## 2018-06-24 RX ADMIN — Medication 1 DROP(S): at 23:36

## 2018-06-24 RX ADMIN — Medication 81 MILLIGRAM(S): at 13:39

## 2018-06-24 RX ADMIN — Medication 5 MILLIGRAM(S): at 05:41

## 2018-06-24 RX ADMIN — HEPARIN SODIUM 1100 UNIT(S)/HR: 5000 INJECTION INTRAVENOUS; SUBCUTANEOUS at 20:41

## 2018-06-24 RX ADMIN — Medication 3 MILLILITER(S): at 03:09

## 2018-06-24 RX ADMIN — Medication 25 MILLIGRAM(S): at 23:35

## 2018-06-24 RX ADMIN — Medication 3 MILLILITER(S): at 16:30

## 2018-06-24 RX ADMIN — AMIODARONE HYDROCHLORIDE 200 MILLIGRAM(S): 400 TABLET ORAL at 05:41

## 2018-06-24 RX ADMIN — Medication 3 MILLILITER(S): at 12:51

## 2018-06-24 RX ADMIN — Medication 1 DROP(S): at 17:49

## 2018-06-24 NOTE — CHART NOTE - NSCHARTNOTEFT_GEN_A_CORE
Notified by RN that patient with increased work of breathing and tachypnea.  Assessed patient at bedside.  He endorses shortness of breath.  Denies of complaints including chest pain, palpitations, dizziness.     Vital Signs Last 24 Hrs  T(C): 36.8 (24 Jun 2018 16:25), Max: 37.1 (23 Jun 2018 21:36)  T(F): 98.3 (24 Jun 2018 16:25), Max: 98.7 (23 Jun 2018 21:36)  HR: 93 (24 Jun 2018 16:25) (77 - 98)  BP: 144/80 (24 Jun 2018 16:25) (119/67 - 144/80)  BP(mean): --  RR: 28 (24 Jun 2018 16:25) (18 - 28)  SpO2: 99% (24 Jun 2018 16:18) (95% - 100%)    Exam:  General: A&Ox1, tachypneic, using accessory muscles to breath  Cardiac: S1, S2, RRR  Lungs: bilateral wheeze appreciated, bibasilar crackles  Abdomen: soft, NT, ND, positive bowel sounds  Extremities: no pedal edema                          7.1    6.51  )-----------( 257      ( 24 Jun 2018 03:45 )             23.9     < from: Transthoracic Echocardiogram (09.07.17 @ 06:51) >    CONCLUSIONS:  1. Mitral annular calcification, and calcified mitral  leaflets with normal diastolic opening. Mild mitral  regurgitation.  2. Aortic valve leaflet morphology not well visualized,  appears calcified with reduced opening. Peak transaortic  valve gradient equals 19 mm Hg, mean transaortic valve  gradient equals 10 mm Hg, consistent with mild aortic  stenosis. Trace aortic regurgitation.  3. Normal left ventricular internal dimensions and wall  thicknesses.  4. Severe segmental left ventricular dysfunction.  The mid  to distal anterior wall, distal inferior wall, anteroseptum  and apex are akinetic. Moderate diastolic dysfunction  (Stage II).  5. Normal right ventricular size and function.  6. RVS Pressure is 46 mm Hg.  Mild pulmonary hypertension.    < end of copied text >    A/P: 71 y/o M PMH CAD, CHF EF (moderate LV systolic dysfunction), DM2, HTN, HLD, CVA, ESRD ON HD, COPD, dementia admitted for respiratory distress.  Found to have pulmonary edema with fluid removal via HD (last HD session was yesterday 6/23/18).  Course c/b enterococcus bacteremia for which he is on ampicillin.  Now with increased work of breathing and signs of volume overload.  RN reports that patient's family members were seen today giving patient large cups of liquid & food to drink/eat when visiting.   -Patient given duoneb treatment for wheezing.  Stat CXR done; awaiting report by radiology.  Will continue standing duoneb treatments as ordered.  -Placed patient back on BIPAP for increased work of breathing.    -Discussed case with nephrology who recommended additional session of HD today.  They will also do session of HD tomorrow 6/25.  -Continuous pulse oximetry to monitor oxygen saturations (no hypoxia noted).   -Dr. Salcido educated patient's family members on avoiding bringing patient copious amounts of fluid & salty foods to prevent fluid overload.  -Patient noted with anemia.  Discussed Hg 7.1 with Dr. Salcido.  Given fluid overloaded status, will hold off on transfusion today.  If H/H remains low tomorrow 6/25, will consider transfusing tomorrow during HD.    -Will continue to monitor patient closely.

## 2018-06-24 NOTE — PROGRESS NOTE ADULT - PROBLEM SELECTOR PLAN 1
Patient with h/o ESRD on HD; Pt dialzyed yesterday with 1.5 L fluid removal, tolerated well. Plan for HD today for fluid removal.  Monitor BMP and BP

## 2018-06-24 NOTE — PROGRESS NOTE ADULT - SUBJECTIVE AND OBJECTIVE BOX
CARDIOLOGY FOLLOW UP NOTE - DR. MENDOZA (coverage for [facility])    Subjective:    no inc sob, no cp     PHYSICAL EXAM:  T(C): 36.8 (06-24-18 @ 05:33), Max: 37.5 (06-23-18 @ 14:43)  HR: 84 (06-24-18 @ 05:33) (77 - 98)  BP: 143/69 (06-24-18 @ 05:33) (119/67 - 143/69)  RR: 18 (06-24-18 @ 05:33) (17 - 18)  SpO2: 99% (06-24-18 @ 05:33) (95% - 100%)  Wt(kg): --  I&O's Summary    23 Jun 2018 07:01  -  24 Jun 2018 07:00  --------------------------------------------------------  IN: 1120 mL / OUT: 1900 mL / NET: -780 mL        Appearance: Normal	  Cardiovascular: Normal S1 S2,RRR, No JVD, No murmurs  Respiratory: Lungs clear to auscultation	  Gastrointestinal:  Soft, Non-tender, + BS	  Extremities: Normal range of motion, No clubbing, cyanosis or edema      MEDICATIONS  (STANDING):  ALBUTerol/ipratropium for Nebulization 3 milliLiter(s) Nebulizer every 6 hours  amiodarone    Tablet 200 milliGRAM(s) Oral daily  ampicillin  IVPB 2 Gram(s) IV Intermittent every 12 hours  artificial  tears Solution 1 Drop(s) Both EYES four times a day  aspirin  chewable 81 milliGRAM(s) Oral daily  atorvastatin 40 milliGRAM(s) Oral at bedtime  busPIRone 5 milliGRAM(s) Oral two times a day  clopidogrel Tablet 75 milliGRAM(s) Oral daily  dextrose 50% Injectable 12.5 Gram(s) IV Push once  dextrose 50% Injectable 25 Gram(s) IV Push once  dextrose 50% Injectable 25 Gram(s) IV Push once  epoetin georgie Injectable 4000 Unit(s) IV Push <User Schedule>  insulin lispro (HumaLOG) corrective regimen sliding scale   SubCutaneous three times a day before meals  insulin lispro (HumaLOG) corrective regimen sliding scale   SubCutaneous at bedtime  metoprolol tartrate 25 milliGRAM(s) Oral two times a day  multivitamin 1 Tablet(s) Oral daily  pantoprazole    Tablet 40 milliGRAM(s) Oral before breakfast  tamsulosin 0.4 milliGRAM(s) Oral at bedtime      TELEMETRY: 	    ECG:  	  RADIOLOGY:   DIAGNOSTIC TESTING:  [ ] Echocardiogram:  [ ] Catheterization:  [ ] Stress Test:    OTHER: 	    LABS:	 	    CARDIAC MARKERS:                                7.1    6.51  )-----------( 257      ( 24 Jun 2018 03:45 )             23.9     06-24    137  |  95<L>  |  26<H>  ----------------------------<  157<H>  3.7   |  26  |  4.44<H>    Ca    8.8      24 Jun 2018 03:45  Mg     2.2     06-24      proBNP:     Lipid Profile:   HgA1c:

## 2018-06-24 NOTE — CHART NOTE - NSCHARTNOTEFT_GEN_A_CORE
Notified by RN that patient converted to Afib.  Discussed case with Dr. Salcido.  Will start heparin gtt now given increased CHADSVASC score.  Cardiology to follow up regarding long-term anticoagulation plan in AM.  Will monitor closely. Notified by RN that patient converted to Afib.  Discussed case with Dr. Salcido.  Will start heparin gtt now given increased CHADSVASC score.  Cardiology to follow up regarding long-term anticoagulation plan in AM.  Cardiology Dr. Patterson made aware.  Will monitor closely.

## 2018-06-24 NOTE — PROGRESS NOTE ADULT - ASSESSMENT
Echo - 9/17 - EF severely decreased  CXR w/ pulm edema    a/p     1) Acute on chronic systolic CHF - clinically stable, continue volume removal with HD, repeat echo with some improvement in LV function  mild troponin elevation likely sec to ESRD and CHF. cont with BB . no acei or arb due to borderline low bp  2) CAD s/p PCI - on ASA and plavix   3) ESRD on HD  4) bacteremia -iv abx, repeat bcx negative, echo with no obvious nve

## 2018-06-24 NOTE — PROGRESS NOTE ADULT - SUBJECTIVE AND OBJECTIVE BOX
[facility] DIVISION OF KIDNEY DISEASES AND HYPERTENSION -- [phone]   FOLLOW UP NOTE  --------------------------------------------------------------------------------  24 hr events:  Pt dialyzed yesterday with 1.5L fluid removal, tolerated well. Pt with sob today.    PAST HISTORY  --------------------------------------------------------------------------------  No significant changes to PMH, PSH, FHx, SHx, unless otherwise noted    ALLERGIES & MEDICATIONS  --------------------------------------------------------------------------------  Allergies    No Known Allergies    Intolerances      Standing Inpatient Medications  ALBUTerol/ipratropium for Nebulization 3 milliLiter(s) Nebulizer every 6 hours  amiodarone    Tablet 200 milliGRAM(s) Oral daily  ampicillin  IVPB 2 Gram(s) IV Intermittent every 12 hours  artificial  tears Solution 1 Drop(s) Both EYES four times a day  aspirin  chewable 81 milliGRAM(s) Oral daily  atorvastatin 40 milliGRAM(s) Oral at bedtime  busPIRone 5 milliGRAM(s) Oral two times a day  clopidogrel Tablet 75 milliGRAM(s) Oral daily  dextrose 50% Injectable 12.5 Gram(s) IV Push once  dextrose 50% Injectable 25 Gram(s) IV Push once  dextrose 50% Injectable 25 Gram(s) IV Push once  epoetin georgie Injectable 4000 Unit(s) IV Push <User Schedule>  insulin lispro (HumaLOG) corrective regimen sliding scale   SubCutaneous three times a day before meals  insulin lispro (HumaLOG) corrective regimen sliding scale   SubCutaneous at bedtime  metoprolol tartrate 25 milliGRAM(s) Oral two times a day  multivitamin 1 Tablet(s) Oral daily  pantoprazole    Tablet 40 milliGRAM(s) Oral before breakfast  tamsulosin 0.4 milliGRAM(s) Oral at bedtime    PRN Inpatient Medications  acetaminophen  Suppository 650 milliGRAM(s) Rectal every 6 hours PRN  dextrose 40% Gel 15 Gram(s) Oral once PRN  glucagon  Injectable 1 milliGRAM(s) IntraMuscular once PRN      REVIEW OF SYSTEMS  --------------------------------------------------------------------------------  General: no fever  CVS: no chest pain  RESP: + sob, no cough  ABD: no abdominal pain  MSK: no edema     VITALS/PHYSICAL EXAM  --------------------------------------------------------------------------------  T(C): 36.8 (06-24-18 @ 12:34), Max: 37.5 (06-23-18 @ 16:41)  HR: 93 (06-24-18 @ 12:51) (77 - 98)  BP: 140/84 (06-24-18 @ 12:34) (119/67 - 143/69)  RR: 18 (06-24-18 @ 12:34) (17 - 18)  SpO2: 100% (06-24-18 @ 12:51) (95% - 100%)  Wt(kg): --        06-23-18 @ 07:01  -  06-24-18 @ 07:00  --------------------------------------------------------  IN: 1120 mL / OUT: 1900 mL / NET: -780 mL      Physical Exam:  	Gen: NAD  	HEENT: MMM  	Pulm: bibasilar rales  	CV: S1S2  	Abd: Soft, +BS  	Ext: No LE edema B/L                      Neuro: follows commands  	Skin: Warm and Dry   	Vascular access: RUE AVF+ thrill + bruit    LABS/STUDIES  --------------------------------------------------------------------------------              7.1    6.51  >-----------<  257      [06-24-18 @ 03:45]              23.9     137  |  95  |  26  ----------------------------<  157      [06-24-18 @ 03:45]  3.7   |  26  |  4.44        Ca     8.8     [06-24-18 @ 03:45]      Mg     2.2     [06-24-18 @ 03:45]            Creatinine Trend:  SCr 4.44 [06-24 @ 03:45]  SCr 6.21 [06-23 @ 06:45]  SCr 4.68 [06-22 @ 06:30]  SCr 6.67 [06-21 @ 13:35]  SCr 6.08 [06-21 @ 06:45]        Iron 41, TIBC 157, %sat --      [06-20-18 @ 06:00]  Ferritin 2578      [06-20-18 @ 06:00]  PTH 76.54 (Ca --)      [06-20-18 @ 06:00]   --  PTH -- (Ca 8.5)      [09-08-17 @ 09:29]   156  HbA1c 6.6      [06-20-18 @ 06:00]  TSH 5.93      [10-21-17 @ 09:04]  Lipid: chol 105, , HDL 35, LDL 35      [09-27-17 @ 07:40]    HBsAb <3.0      [06-19-18 @ 17:05]  HBsAg NEGATIVE      [06-19-18 @ 17:05]  HBcAb Nonreactive      [06-19-18 @ 17:05]  HCV 0.35, Nonreactive Hepatitis C AB  S/CO Ratio                        Interpretation  < 1.0                                     Non-Reactive  1.0 - 4.9                           Weakly-Reactive  > 5.0                                 Reactive  Non-Reactive: Aperson with a non-reactive HCV antibody  result is considered uninfected.  No further action is  needed unless recent infection is suspected.  In these  cases, consider repeat testing later to detect  seroconversion..  Weakly-Reactive: HCV antibody test is abnormal, HCV RNA  Qualitative test will follow.  Reactive: HCV antibody test is abnormal, HCV RNA  Qualitative test will follow.  Note: HCV antibody testing is performed on the [employer]   system.      [06-19-18 @ 17:05]

## 2018-06-24 NOTE — PROGRESS NOTE ADULT - PROBLEM SELECTOR PLAN 2
Check iron studies. Continue epogen with HD Pt may benefit from pRbC. Monitor H/H    * Discussed with Dr. Gonzales

## 2018-06-25 DIAGNOSIS — E87.70 FLUID OVERLOAD, UNSPECIFIED: ICD-10-CM

## 2018-06-25 LAB
APTT BLD: 50.2 SEC — HIGH (ref 27.5–37.4)
APTT BLD: 55.1 SEC — HIGH (ref 27.5–37.4)
APTT BLD: 85.5 SEC — HIGH (ref 27.5–37.4)
BASOPHILS # BLD AUTO: 0.02 K/UL — SIGNIFICANT CHANGE UP (ref 0–0.2)
BASOPHILS NFR BLD AUTO: 0.2 % — SIGNIFICANT CHANGE UP (ref 0–2)
BUN SERPL-MCNC: 19 MG/DL — SIGNIFICANT CHANGE UP (ref 7–23)
CALCIUM SERPL-MCNC: 9.2 MG/DL — SIGNIFICANT CHANGE UP (ref 8.4–10.5)
CHLORIDE SERPL-SCNC: 94 MMOL/L — LOW (ref 98–107)
CO2 SERPL-SCNC: 27 MMOL/L — SIGNIFICANT CHANGE UP (ref 22–31)
CREAT SERPL-MCNC: 3.84 MG/DL — HIGH (ref 0.5–1.3)
EOSINOPHIL # BLD AUTO: 0.3 K/UL — SIGNIFICANT CHANGE UP (ref 0–0.5)
EOSINOPHIL NFR BLD AUTO: 3.7 % — SIGNIFICANT CHANGE UP (ref 0–6)
GLUCOSE BLDC GLUCOMTR-MCNC: 116 MG/DL — HIGH (ref 70–99)
GLUCOSE BLDC GLUCOMTR-MCNC: 129 MG/DL — HIGH (ref 70–99)
GLUCOSE BLDC GLUCOMTR-MCNC: 136 MG/DL — HIGH (ref 70–99)
GLUCOSE BLDC GLUCOMTR-MCNC: 176 MG/DL — HIGH (ref 70–99)
GLUCOSE SERPL-MCNC: 125 MG/DL — HIGH (ref 70–99)
HCT VFR BLD CALC: 26.4 % — LOW (ref 39–50)
HGB BLD-MCNC: 7.9 G/DL — LOW (ref 13–17)
IMM GRANULOCYTES # BLD AUTO: 0.04 # — SIGNIFICANT CHANGE UP
IMM GRANULOCYTES NFR BLD AUTO: 0.5 % — SIGNIFICANT CHANGE UP (ref 0–1.5)
INR BLD: 1.26 — HIGH (ref 0.88–1.17)
LYMPHOCYTES # BLD AUTO: 1.85 K/UL — SIGNIFICANT CHANGE UP (ref 1–3.3)
LYMPHOCYTES # BLD AUTO: 22.9 % — SIGNIFICANT CHANGE UP (ref 13–44)
MAGNESIUM SERPL-MCNC: 2.3 MG/DL — SIGNIFICANT CHANGE UP (ref 1.6–2.6)
MCHC RBC-ENTMCNC: 27.9 PG — SIGNIFICANT CHANGE UP (ref 27–34)
MCHC RBC-ENTMCNC: 29.9 % — LOW (ref 32–36)
MCV RBC AUTO: 93.3 FL — SIGNIFICANT CHANGE UP (ref 80–100)
MONOCYTES # BLD AUTO: 0.63 K/UL — SIGNIFICANT CHANGE UP (ref 0–0.9)
MONOCYTES NFR BLD AUTO: 7.8 % — SIGNIFICANT CHANGE UP (ref 2–14)
NEUTROPHILS # BLD AUTO: 5.25 K/UL — SIGNIFICANT CHANGE UP (ref 1.8–7.4)
NEUTROPHILS NFR BLD AUTO: 64.9 % — SIGNIFICANT CHANGE UP (ref 43–77)
NRBC # FLD: 0 — SIGNIFICANT CHANGE UP
PLATELET # BLD AUTO: 273 K/UL — SIGNIFICANT CHANGE UP (ref 150–400)
PMV BLD: 9.9 FL — SIGNIFICANT CHANGE UP (ref 7–13)
POTASSIUM SERPL-MCNC: 3.7 MMOL/L — SIGNIFICANT CHANGE UP (ref 3.5–5.3)
POTASSIUM SERPL-SCNC: 3.7 MMOL/L — SIGNIFICANT CHANGE UP (ref 3.5–5.3)
PROTHROM AB SERPL-ACNC: 14.6 SEC — HIGH (ref 9.8–13.1)
RBC # BLD: 2.83 M/UL — LOW (ref 4.2–5.8)
RBC # FLD: 14.9 % — HIGH (ref 10.3–14.5)
SODIUM SERPL-SCNC: 138 MMOL/L — SIGNIFICANT CHANGE UP (ref 135–145)
WBC # BLD: 8.09 K/UL — SIGNIFICANT CHANGE UP (ref 3.8–10.5)
WBC # FLD AUTO: 8.09 K/UL — SIGNIFICANT CHANGE UP (ref 3.8–10.5)

## 2018-06-25 PROCEDURE — 99233 SBSQ HOSP IP/OBS HIGH 50: CPT | Mod: GC

## 2018-06-25 PROCEDURE — 99232 SBSQ HOSP IP/OBS MODERATE 35: CPT

## 2018-06-25 RX ADMIN — Medication 5 MILLIGRAM(S): at 06:45

## 2018-06-25 RX ADMIN — ATORVASTATIN CALCIUM 40 MILLIGRAM(S): 80 TABLET, FILM COATED ORAL at 23:55

## 2018-06-25 RX ADMIN — Medication 81 MILLIGRAM(S): at 11:21

## 2018-06-25 RX ADMIN — Medication 25 MILLIGRAM(S): at 18:06

## 2018-06-25 RX ADMIN — Medication 1 DROP(S): at 06:45

## 2018-06-25 RX ADMIN — Medication 5 MILLIGRAM(S): at 18:07

## 2018-06-25 RX ADMIN — Medication 1: at 12:55

## 2018-06-25 RX ADMIN — Medication 216 GRAM(S): at 18:05

## 2018-06-25 RX ADMIN — HEPARIN SODIUM 1300 UNIT(S)/HR: 5000 INJECTION INTRAVENOUS; SUBCUTANEOUS at 12:55

## 2018-06-25 RX ADMIN — Medication 3 MILLILITER(S): at 10:36

## 2018-06-25 RX ADMIN — PANTOPRAZOLE SODIUM 40 MILLIGRAM(S): 20 TABLET, DELAYED RELEASE ORAL at 06:45

## 2018-06-25 RX ADMIN — HEPARIN SODIUM 1200 UNIT(S)/HR: 5000 INJECTION INTRAVENOUS; SUBCUTANEOUS at 04:12

## 2018-06-25 RX ADMIN — Medication 1 DROP(S): at 18:07

## 2018-06-25 RX ADMIN — Medication 3 MILLILITER(S): at 16:31

## 2018-06-25 RX ADMIN — Medication 216 GRAM(S): at 06:53

## 2018-06-25 RX ADMIN — ERYTHROPOIETIN 4000 UNIT(S): 10000 INJECTION, SOLUTION INTRAVENOUS; SUBCUTANEOUS at 21:06

## 2018-06-25 RX ADMIN — HEPARIN SODIUM 2500 UNIT(S): 5000 INJECTION INTRAVENOUS; SUBCUTANEOUS at 04:12

## 2018-06-25 RX ADMIN — HEPARIN SODIUM 1300 UNIT(S)/HR: 5000 INJECTION INTRAVENOUS; SUBCUTANEOUS at 20:15

## 2018-06-25 RX ADMIN — Medication 25 MILLIGRAM(S): at 06:45

## 2018-06-25 RX ADMIN — Medication 5 MILLIGRAM(S): at 11:22

## 2018-06-25 RX ADMIN — Medication 1 TABLET(S): at 11:21

## 2018-06-25 RX ADMIN — Medication 1 DROP(S): at 11:22

## 2018-06-25 RX ADMIN — CLOPIDOGREL BISULFATE 75 MILLIGRAM(S): 75 TABLET, FILM COATED ORAL at 11:22

## 2018-06-25 RX ADMIN — TAMSULOSIN HYDROCHLORIDE 0.4 MILLIGRAM(S): 0.4 CAPSULE ORAL at 23:55

## 2018-06-25 RX ADMIN — Medication 1 DROP(S): at 23:55

## 2018-06-25 RX ADMIN — AMIODARONE HYDROCHLORIDE 200 MILLIGRAM(S): 400 TABLET ORAL at 06:44

## 2018-06-25 RX ADMIN — Medication 3 MILLILITER(S): at 04:35

## 2018-06-25 NOTE — PROGRESS NOTE ADULT - PROBLEM SELECTOR PLAN 1
Patient with h/o ESRD on HD; MWF schedule. Last HD was done on 6/24 for fluid overload. Labs reviewed from today. Will arrange for maintenance HD today. Monitor BMP.

## 2018-06-25 NOTE — PROGRESS NOTE ADULT - ASSESSMENT
· Assessment		  72 year old with esrd and CHF presents with shortness of breath and hypoxia.   HD via AV fistula  He improved after diuresis with furosemide.  Had leukocytosis and fever.     enterococcal bacteremia cleared  CT with ? pyelo. likely urinary source  Although he is ESRd, he had a chronic james at presentation.  Echo demonstrates no evidence of endocarditis    Suggest:  Continue ampicillin 2 gm iv q 12 for 14 days of abx with ampicillin through 7/4/2018,

## 2018-06-25 NOTE — PROGRESS NOTE ADULT - SUBJECTIVE AND OBJECTIVE BOX
chief complaint : dyspnea        SUBJECTIVE / OVERNIGHT EVENTS: pt appears comfortable    MEDICATIONS  (STANDING):  ALBUTerol/ipratropium for Nebulization 3 milliLiter(s) Nebulizer every 6 hours  amiodarone    Tablet 200 milliGRAM(s) Oral daily  ampicillin  IVPB 2 Gram(s) IV Intermittent every 12 hours  artificial  tears Solution 1 Drop(s) Both EYES four times a day  aspirin  chewable 81 milliGRAM(s) Oral daily  atorvastatin 40 milliGRAM(s) Oral at bedtime  busPIRone 5 milliGRAM(s) Oral two times a day  clopidogrel Tablet 75 milliGRAM(s) Oral daily  dextrose 50% Injectable 12.5 Gram(s) IV Push once  dextrose 50% Injectable 25 Gram(s) IV Push once  dextrose 50% Injectable 25 Gram(s) IV Push once  epoetin georgie Injectable 4000 Unit(s) IV Push <User Schedule>  heparin  Infusion.  Unit(s)/Hr (11 mL/Hr) IV Continuous <Continuous>  insulin lispro (HumaLOG) corrective regimen sliding scale   SubCutaneous three times a day before meals  insulin lispro (HumaLOG) corrective regimen sliding scale   SubCutaneous at bedtime  metoprolol tartrate 25 milliGRAM(s) Oral two times a day  multivitamin 1 Tablet(s) Oral daily  pantoprazole    Tablet 40 milliGRAM(s) Oral before breakfast  tamsulosin 0.4 milliGRAM(s) Oral at bedtime    MEDICATIONS  (PRN):  acetaminophen  Suppository 650 milliGRAM(s) Rectal every 6 hours PRN For Temp greater than 38 C (100.4 F)  dextrose 40% Gel 15 Gram(s) Oral once PRN Blood Glucose LESS THAN 70 milliGRAM(s)/deciliter  glucagon  Injectable 1 milliGRAM(s) IntraMuscular once PRN Glucose LESS THAN 70 milligrams/deciliter  heparin  Injectable 5000 Unit(s) IV Push every 6 hours PRN For aPTT less than 40  heparin  Injectable 2500 Unit(s) IV Push every 6 hours PRN For aPTT between 40 - 57    Vital Signs Last 24 Hrs  T(C): 36.5 (25 Jun 2018 19:55), Max: 36.8 (25 Jun 2018 06:57)  T(F): 97.7 (25 Jun 2018 19:55), Max: 98.3 (25 Jun 2018 06:57)  HR: 75 (25 Jun 2018 19:55) (74 - 93)  BP: 101/59 (25 Jun 2018 19:55) (101/59 - 129/61)  BP(mean): 105 (25 Jun 2018 18:02) (105 - 105)  RR: 18 (25 Jun 2018 19:55) (18 - 19)  SpO2: 95% (25 Jun 2018 19:07) (95% - 100%)      PHYSICAL EXAM:  GENERAL: NAD  EYES: EOMI, PERRLA  NECK: Supple, No JVD  CHEST/LUNG: dec breath sounds at bases,  HEART:  S1 , S2 +  ABDOMEN: soft bs+  EXTREMITIES:  trace edema+  NEUROLOGY:alert awake     LABS:  06-25    138  |  94<L>  |  19  ----------------------------<  125<H>  3.7   |  27  |  3.84<H>    Ca    9.2      25 Jun 2018 03:00  Mg     2.3     06-25      Creatinine Trend: 3.84 <--, 4.44 <--, 6.21 <--, 4.68 <--, 6.67 <--, 6.08 <--, 4.72 <--, 4.12 <--, 7.78 <--                        7.9    8.09  )-----------( 273      ( 25 Jun 2018 03:00 )             26.4     Urine Studies:              PT/INR - ( 25 Jun 2018 02:11 )   PT: 14.6 SEC;   INR: 1.26          PTT - ( 25 Jun 2018 19:05 )  PTT:85.5 SEC

## 2018-06-25 NOTE — PROGRESS NOTE ADULT - SUBJECTIVE AND OBJECTIVE BOX
Follow Up:      Interval History/ROS: fatigued, denies pain    Allergies  No Known Allergies    ANTIMICROBIALS:  ampicillin  IVPB 2 every 12 hours    OTHER MEDS:  MEDICATIONS  (STANDING):  acetaminophen  Suppository 650 every 6 hours PRN  ALBUTerol/ipratropium for Nebulization 3 every 6 hours  amiodarone    Tablet 200 daily  aspirin  chewable 81 daily  atorvastatin 40 at bedtime  busPIRone 5 two times a day  clopidogrel Tablet 75 daily  dextrose 40% Gel 15 once PRN  dextrose 50% Injectable 12.5 once  dextrose 50% Injectable 25 once  dextrose 50% Injectable 25 once  epoetin georgie Injectable 4000 <User Schedule>  glucagon  Injectable 1 once PRN  heparin  Infusion.  <Continuous>  heparin  Injectable 5000 every 6 hours PRN  heparin  Injectable 2500 every 6 hours PRN  insulin lispro (HumaLOG) corrective regimen sliding scale  three times a day before meals  insulin lispro (HumaLOG) corrective regimen sliding scale  at bedtime  metoprolol tartrate 25 two times a day  pantoprazole    Tablet 40 before breakfast  tamsulosin 0.4 at bedtime      Vital Signs Last 24 Hrs  T(C): 36.8 (25 Jun 2018 12:46), Max: 36.9 (24 Jun 2018 18:40)  T(F): 98.3 (25 Jun 2018 12:46), Max: 98.4 (24 Jun 2018 18:40)  HR: 80 (25 Jun 2018 16:35) (77 - 103)  BP: 128/64 (25 Jun 2018 12:46) (109/72 - 128/64)  BP(mean): --  RR: 18 (25 Jun 2018 12:46) (18 - 20)  SpO2: 96% (25 Jun 2018 16:35) (95% - 100%)    PHYSICAL EXAM:  General: chronically ill appearing, NAD, Non-toxic  Neurology: A&Ox3, nonfocal, lethargic  Respiratory: no resp distress  CV: RRR, S1S2, no murmurs, rubs or gallops  Abdominal: Soft, Non-tender, mildly distended, no CVAT  Extremities: No edema  Line Sites: Clear  Skin: No rash                        7.9    8.09  )-----------( 273      ( 25 Jun 2018 03:00 )             26.4       06-25    138  |  94<L>  |  19  ----------------------------<  125<H>  3.7   |  27  |  3.84<H>    Ca    9.2      25 Jun 2018 03:00  Mg     2.3     06-25      MICROBIOLOGY:  BLOOD PERIPHERAL  06-23-18 --  --  --      BLOOD PERIPHERAL  06-21-18 --  --  --      BLOOD PERIPHERAL  06-20-18 --  --  BLOOD CULTURE PCR  Enterococcus faecalis      < from: TTE with Doppler (w/Cont) (06.24.18 @ 09:39) >  CONCLUSIONS:  1. Mild-moderate mitral regurgitation.  2. Calcified trileaflet aortic valve with normal opening.  3. Moderate global left ventricular systolic dysfunction.  Endocardial visualization enhanced with intravenous  injection of echo contrast (Definity).  4. Normal right ventricular size and function.  *** No previous Echo exam.  ---------------------------------------------    < end of copied text >  RADIOLOGY:      Eleazar Melgoza MD; Division of Infectious Disease; Pager: [phone]; nights and weekends: [phone]

## 2018-06-25 NOTE — PROGRESS NOTE ADULT - PROBLEM SELECTOR PLAN 2
Patient does not appear to be in fluid overload on examination today. Will arrange for maintenance HD with UF as tolerated.

## 2018-06-25 NOTE — PROGRESS NOTE ADULT - PROBLEM SELECTOR PLAN 1
cont present abx   < from: CT Abdomen and Pelvis w/ IV Cont (06.22.18 @ 10:41) >    Atrophic kidneys with cortical scarring and heterogeneous right renal   enhancement, question pyelonephritis. Correlate with urinalysis/culture.    Circumferential mild rectal wall thickening. Correlate for signs of   proctitis.Bladder stone.Bilateral pleural effusions. gi eval

## 2018-06-25 NOTE — PROGRESS NOTE ADULT - SUBJECTIVE AND OBJECTIVE BOX
[facility] Division of Kidney Diseases & Hypertension  FOLLOW UP NOTE  [phone]--------------------------------------------------------------------------------     HPI: 71 y/o M with CAD s/p 12-14 stents placed in 1990's, and BABAR x 5 placed in Sept 2017, CHF with EF 20%, DM, HTN, HLD, AF on coumadin, CVA , ESRD on HD (MWF) COPD, bipolar disorder, dementia admitted for SOB. Patient had urgent session of HD yesterday for fluid overload; had ~ 2.6 L of fluid removed and tolerated it well. Patient seen and examined at bedside; denies any complaints at this time.     PAST HISTORY  --------------------------------------------------------------------------------  No significant changes to PMH, PSH, FHx, SHx, unless otherwise noted    ALLERGIES & MEDICATIONS  --------------------------------------------------------------------------------  Allergies    No Known Allergies    Intolerances      Standing Inpatient Medications  ALBUTerol/ipratropium for Nebulization 3 milliLiter(s) Nebulizer every 6 hours  amiodarone    Tablet 200 milliGRAM(s) Oral daily  ampicillin  IVPB 2 Gram(s) IV Intermittent every 12 hours  artificial  tears Solution 1 Drop(s) Both EYES four times a day  aspirin  chewable 81 milliGRAM(s) Oral daily  atorvastatin 40 milliGRAM(s) Oral at bedtime  busPIRone 5 milliGRAM(s) Oral two times a day  clopidogrel Tablet 75 milliGRAM(s) Oral daily  dextrose 50% Injectable 12.5 Gram(s) IV Push once  dextrose 50% Injectable 25 Gram(s) IV Push once  dextrose 50% Injectable 25 Gram(s) IV Push once  epoetin georgie Injectable 4000 Unit(s) IV Push <User Schedule>  heparin  Infusion.  Unit(s)/Hr IV Continuous <Continuous>  insulin lispro (HumaLOG) corrective regimen sliding scale   SubCutaneous three times a day before meals  insulin lispro (HumaLOG) corrective regimen sliding scale   SubCutaneous at bedtime  metoprolol tartrate 25 milliGRAM(s) Oral two times a day  multivitamin 1 Tablet(s) Oral daily  pantoprazole    Tablet 40 milliGRAM(s) Oral before breakfast  tamsulosin 0.4 milliGRAM(s) Oral at bedtime    PRN Inpatient Medications  acetaminophen  Suppository 650 milliGRAM(s) Rectal every 6 hours PRN  dextrose 40% Gel 15 Gram(s) Oral once PRN  glucagon  Injectable 1 milliGRAM(s) IntraMuscular once PRN  heparin  Injectable 5000 Unit(s) IV Push every 6 hours PRN  heparin  Injectable 2500 Unit(s) IV Push every 6 hours PRN      REVIEW OF SYSTEMS  --------------------------------------------------------------------------------    Unable to obtain.     VITALS/PHYSICAL EXAM  --------------------------------------------------------------------------------  T(C): 36.8 (06-25-18 @ 12:46), Max: 36.9 (06-24-18 @ 18:40)  HR: 82 (06-25-18 @ 12:46) (77 - 103)  BP: 128/64 (06-25-18 @ 12:46) (109/72 - 144/80)  RR: 18 (06-25-18 @ 12:46) (18 - 28)  SpO2: 100% (06-25-18 @ 12:46) (95% - 100%)  Wt(kg): --        06-24-18 @ 07:01  -  06-25-18 @ 07:00  --------------------------------------------------------  IN: 924 mL / OUT: 3000 mL / NET: -2076 mL      Physical Exam:  	           Gen: Elderly male, NAD  	HEENT: anicteric  	Pulm: Rales present over both lungs.   	CV:  S1S2 rrr  	Abd: +BS, soft   	Ext: No B/L Lower ext edema  	Neuro: minimally verbal, follows commands  	Skin: Warm, without rashes  	Vascular access: Right UE AVF present; thrill and bruit heard.       LABS/STUDIES  --------------------------------------------------------------------------------              7.9    8.09  >-----------<  273      [06-25-18 @ 03:00]              26.4     138  |  94  |  19  ----------------------------<  125      [06-25-18 @ 03:00]  3.7   |  27  |  3.84        Ca     9.2     [06-25-18 @ 03:00]      Mg     2.3     [06-25-18 @ 03:00]      PT/INR: PT 14.6 , INR 1.26       [06-25-18 @ 02:11]  PTT: 50.2       [06-25-18 @ 11:16]      Creatinine Trend:  SCr 3.84 [06-25 @ 03:00]  SCr 4.44 [06-24 @ 03:45]  SCr 6.21 [06-23 @ 06:45]  SCr 4.68 [06-22 @ 06:30]  SCr 6.67 [06-21 @ 13:35]        Iron 41, TIBC 157, %sat --      [06-20-18 @ 06:00]  Ferritin 2578      [06-20-18 @ 06:00]  PTH 76.54 (Ca --)      [06-20-18 @ 06:00]   --  HbA1c 6.6      [06-20-18 @ 06:00]    HBsAb <3.0      [06-19-18 @ 17:05]  HBsAg NEGATIVE      [06-19-18 @ 17:05]  HBcAb Nonreactive      [06-19-18 @ 17:05]  HCV 0.35, Nonreactive Hepatitis C AB  S/CO Ratio                        Interpretation  < 1.0                                     Non-Reactive  1.0 - 4.9                           Weakly-Reactive  > 5.0                                 Reactive  Non-Reactive: [place]erson with a non-reactive HCV antibody  result is considered uninfected.  No further action is  needed unless recent infection is suspected.  In these  cases, consider repeat testing later to detect  seroconversion..  Weakly-Reactive: HCV antibody test is abnormal, HCV RNA  Qualitative test will follow.  Reactive: HCV antibody test is abnormal, HCV RNA  Qualitative test will follow.  Note: HCV antibody testing is performed on the [employer]   system.      [06-19-18 @ 17:05]

## 2018-06-25 NOTE — PROGRESS NOTE ADULT - ASSESSMENT
Echo - 9/17 - EF severely decreased  CXR w/ pulm edema    a/p     1) Acute on chronic systolic CHF - clinically stable, continue volume removal with HD, repeat echo with some improvement in LV function  mild troponin elevation likely sec to ESRD and CHF. cont with BB . no acei or arb due to borderline low bp  2) CAD s/p PCI - on ASA and plavix   3) ESRD on HD  4) bacteremia -iv abx, repeat bcx negative, echo with no obvious source

## 2018-06-25 NOTE — PROGRESS NOTE ADULT - SUBJECTIVE AND OBJECTIVE BOX
Reid Martinez MD  Interventional Cardiology / Advance Heart Failure and Cardiac Transplant Specialist  [place] Office : [phone] [place] [address]. [phone]  Tel:   [place] Office : [address] [facility] [address] [address]  Tel: [phone]  Cell : [phone]    Subjective : Pt lying in bed comfortable, not in distress, denies any chest pain or SOB  	  MEDICATIONS:  amiodarone    Tablet 200 milliGRAM(s) Oral daily  aspirin  chewable 81 milliGRAM(s) Oral daily  clopidogrel Tablet 75 milliGRAM(s) Oral daily  heparin  Infusion.  Unit(s)/Hr IV Continuous <Continuous>  heparin  Injectable 5000 Unit(s) IV Push every 6 hours PRN  heparin  Injectable 2500 Unit(s) IV Push every 6 hours PRN  metoprolol tartrate 25 milliGRAM(s) Oral two times a day  tamsulosin 0.4 milliGRAM(s) Oral at bedtime    ampicillin  IVPB 2 Gram(s) IV Intermittent every 12 hours    ALBUTerol/ipratropium for Nebulization 3 milliLiter(s) Nebulizer every 6 hours    acetaminophen  Suppository 650 milliGRAM(s) Rectal every 6 hours PRN  busPIRone 5 milliGRAM(s) Oral two times a day    pantoprazole    Tablet 40 milliGRAM(s) Oral before breakfast    atorvastatin 40 milliGRAM(s) Oral at bedtime  dextrose 40% Gel 15 Gram(s) Oral once PRN  dextrose 50% Injectable 12.5 Gram(s) IV Push once  dextrose 50% Injectable 25 Gram(s) IV Push once  dextrose 50% Injectable 25 Gram(s) IV Push once  glucagon  Injectable 1 milliGRAM(s) IntraMuscular once PRN  insulin lispro (HumaLOG) corrective regimen sliding scale   SubCutaneous three times a day before meals  insulin lispro (HumaLOG) corrective regimen sliding scale   SubCutaneous at bedtime    artificial  tears Solution 1 Drop(s) Both EYES four times a day  epoetin georgie Injectable 4000 Unit(s) IV Push <User Schedule>  multivitamin 1 Tablet(s) Oral daily      PHYSICAL EXAM:  T(C): 36.5 (06-25-18 @ 19:55), Max: 36.8 (06-25-18 @ 06:57)  HR: 75 (06-25-18 @ 19:55) (74 - 93)  BP: 101/59 (06-25-18 @ 19:55) (101/59 - 129/61)  RR: 18 (06-25-18 @ 19:55) (18 - 19)  SpO2: 95% (06-25-18 @ 19:07) (95% - 100%)  Wt(kg): --  I&O's Summary    24 Jun 2018 07:01  -  25 Jun 2018 07:00  --------------------------------------------------------  IN: 924 mL / OUT: 3000 mL / NET: -2076 mL          Appearance: Normal	  HEENT:   Normal oral mucosa, PERRL, EOMI	  Cardiovascular: Normal S1 S2, No JVD, No murmurs, No edema  Respiratory: Lungs clear to auscultation	  Gastrointestinal:  Soft, Non-tender, + BS	  Extremities: Normal range of motion, No clubbing, cyanosis or edema                                    7.9    8.09  )-----------( 273      ( 25 Jun 2018 03:00 )             26.4     06-25    138  |  94<L>  |  19  ----------------------------<  125<H>  3.7   |  27  |  3.84<H>    Ca    9.2      25 Jun 2018 03:00  Mg     2.3     06-25      proBNP:   Lipid Profile:   HgA1c:   TSH:

## 2018-06-26 LAB
APTT BLD: 93.2 SEC — HIGH (ref 27.5–37.4)
BUN SERPL-MCNC: 12 MG/DL — SIGNIFICANT CHANGE UP (ref 7–23)
CALCIUM SERPL-MCNC: 9 MG/DL — SIGNIFICANT CHANGE UP (ref 8.4–10.5)
CEA SERPL-MCNC: 24.2 NG/ML — HIGH (ref 1–3.8)
CHLORIDE SERPL-SCNC: 90 MMOL/L — LOW (ref 98–107)
CO2 SERPL-SCNC: 28 MMOL/L — SIGNIFICANT CHANGE UP (ref 22–31)
CREAT SERPL-MCNC: 2.47 MG/DL — HIGH (ref 0.5–1.3)
GLUCOSE BLDC GLUCOMTR-MCNC: 141 MG/DL — HIGH (ref 70–99)
GLUCOSE BLDC GLUCOMTR-MCNC: 145 MG/DL — HIGH (ref 70–99)
GLUCOSE BLDC GLUCOMTR-MCNC: 150 MG/DL — HIGH (ref 70–99)
GLUCOSE BLDC GLUCOMTR-MCNC: 169 MG/DL — HIGH (ref 70–99)
GLUCOSE SERPL-MCNC: 99 MG/DL — SIGNIFICANT CHANGE UP (ref 70–99)
HCT VFR BLD CALC: 25.9 % — LOW (ref 39–50)
HGB BLD-MCNC: 7.9 G/DL — LOW (ref 13–17)
INR BLD: 1.33 — HIGH (ref 0.88–1.17)
MAGNESIUM SERPL-MCNC: 2.1 MG/DL — SIGNIFICANT CHANGE UP (ref 1.6–2.6)
MCHC RBC-ENTMCNC: 27.1 PG — SIGNIFICANT CHANGE UP (ref 27–34)
MCHC RBC-ENTMCNC: 30.5 % — LOW (ref 32–36)
MCV RBC AUTO: 89 FL — SIGNIFICANT CHANGE UP (ref 80–100)
NRBC # FLD: 0 — SIGNIFICANT CHANGE UP
PLATELET # BLD AUTO: 266 K/UL — SIGNIFICANT CHANGE UP (ref 150–400)
PMV BLD: 9.3 FL — SIGNIFICANT CHANGE UP (ref 7–13)
POTASSIUM SERPL-MCNC: 3.5 MMOL/L — SIGNIFICANT CHANGE UP (ref 3.5–5.3)
POTASSIUM SERPL-SCNC: 3.5 MMOL/L — SIGNIFICANT CHANGE UP (ref 3.5–5.3)
PROTHROM AB SERPL-ACNC: 14.9 SEC — HIGH (ref 9.8–13.1)
RBC # BLD: 2.91 M/UL — LOW (ref 4.2–5.8)
RBC # FLD: 14.9 % — HIGH (ref 10.3–14.5)
SODIUM SERPL-SCNC: 132 MMOL/L — LOW (ref 135–145)
WBC # BLD: 9.34 K/UL — SIGNIFICANT CHANGE UP (ref 3.8–10.5)
WBC # FLD AUTO: 9.34 K/UL — SIGNIFICANT CHANGE UP (ref 3.8–10.5)

## 2018-06-26 PROCEDURE — 99232 SBSQ HOSP IP/OBS MODERATE 35: CPT | Mod: GC

## 2018-06-26 RX ADMIN — AMIODARONE HYDROCHLORIDE 200 MILLIGRAM(S): 400 TABLET ORAL at 05:42

## 2018-06-26 RX ADMIN — Medication 1 TABLET(S): at 16:53

## 2018-06-26 RX ADMIN — Medication 25 MILLIGRAM(S): at 16:53

## 2018-06-26 RX ADMIN — Medication 216 GRAM(S): at 16:54

## 2018-06-26 RX ADMIN — Medication 5 MILLIGRAM(S): at 16:52

## 2018-06-26 RX ADMIN — Medication 1 DROP(S): at 05:42

## 2018-06-26 RX ADMIN — Medication 216 GRAM(S): at 05:42

## 2018-06-26 RX ADMIN — HEPARIN SODIUM 1300 UNIT(S)/HR: 5000 INJECTION INTRAVENOUS; SUBCUTANEOUS at 04:35

## 2018-06-26 RX ADMIN — PANTOPRAZOLE SODIUM 40 MILLIGRAM(S): 20 TABLET, DELAYED RELEASE ORAL at 05:42

## 2018-06-26 RX ADMIN — Medication 5 MILLIGRAM(S): at 05:41

## 2018-06-26 RX ADMIN — Medication 3 MILLILITER(S): at 03:47

## 2018-06-26 RX ADMIN — Medication 1 DROP(S): at 16:54

## 2018-06-26 RX ADMIN — Medication 25 MILLIGRAM(S): at 05:42

## 2018-06-26 RX ADMIN — Medication 3 MILLILITER(S): at 10:16

## 2018-06-26 RX ADMIN — Medication 3 MILLILITER(S): at 15:47

## 2018-06-26 RX ADMIN — Medication 1 DROP(S): at 23:24

## 2018-06-26 RX ADMIN — Medication 81 MILLIGRAM(S): at 16:54

## 2018-06-26 RX ADMIN — CLOPIDOGREL BISULFATE 75 MILLIGRAM(S): 75 TABLET, FILM COATED ORAL at 16:52

## 2018-06-26 RX ADMIN — Medication 3 MILLILITER(S): at 21:48

## 2018-06-26 NOTE — PROGRESS NOTE ADULT - ASSESSMENT
pt with procitis on ct scan and anemia, guiac negative.  will get cea.  pt will need colon at some point, but in setting of chf risk>> benefit.  await clearance.  inpatient vs op scope/ flex sig.

## 2018-06-26 NOTE — PROGRESS NOTE ADULT - SUBJECTIVE AND OBJECTIVE BOX
chief complaint : dyspnea        SUBJECTIVE / OVERNIGHT EVENTS: pt appears comfortable    MEDICATIONS  (STANDING):  ALBUTerol/ipratropium for Nebulization 3 milliLiter(s) Nebulizer every 6 hours  amiodarone    Tablet 200 milliGRAM(s) Oral daily  ampicillin  IVPB 2 Gram(s) IV Intermittent every 12 hours  artificial  tears Solution 1 Drop(s) Both EYES four times a day  aspirin  chewable 81 milliGRAM(s) Oral daily  atorvastatin 40 milliGRAM(s) Oral at bedtime  busPIRone 5 milliGRAM(s) Oral two times a day  clopidogrel Tablet 75 milliGRAM(s) Oral daily  dextrose 50% Injectable 12.5 Gram(s) IV Push once  dextrose 50% Injectable 25 Gram(s) IV Push once  dextrose 50% Injectable 25 Gram(s) IV Push once  epoetin georgie Injectable 4000 Unit(s) IV Push <User Schedule>  heparin  Infusion.  Unit(s)/Hr (11 mL/Hr) IV Continuous <Continuous>  insulin lispro (HumaLOG) corrective regimen sliding scale   SubCutaneous three times a day before meals  insulin lispro (HumaLOG) corrective regimen sliding scale   SubCutaneous at bedtime  metoprolol tartrate 25 milliGRAM(s) Oral two times a day  multivitamin 1 Tablet(s) Oral daily  pantoprazole    Tablet 40 milliGRAM(s) Oral before breakfast  tamsulosin 0.4 milliGRAM(s) Oral at bedtime    MEDICATIONS  (PRN):  acetaminophen  Suppository 650 milliGRAM(s) Rectal every 6 hours PRN For Temp greater than 38 C (100.4 F)  dextrose 40% Gel 15 Gram(s) Oral once PRN Blood Glucose LESS THAN 70 milliGRAM(s)/deciliter  glucagon  Injectable 1 milliGRAM(s) IntraMuscular once PRN Glucose LESS THAN 70 milligrams/deciliter  heparin  Injectable 5000 Unit(s) IV Push every 6 hours PRN For aPTT less than 40  heparin  Injectable 2500 Unit(s) IV Push every 6 hours PRN For aPTT between 40 - 57    Vital Signs Last 24 Hrs  T(C): 36.7 (26 Jun 2018 05:40), Max: 36.7 (26 Jun 2018 05:40)  T(F): 98 (26 Jun 2018 05:40), Max: 98 (26 Jun 2018 05:40)  HR: 71 (26 Jun 2018 19:16) (71 - 78)  BP: 121/62 (26 Jun 2018 05:40) (101/59 - 121/62)  BP(mean): --  RR: 18 (26 Jun 2018 05:40) (18 - 18)  SpO2: 98% (26 Jun 2018 19:16) (95% - 100%)    PHYSICAL EXAM:  GENERAL: NAD  EYES: EOMI, PERRLA  NECK: Supple, No JVD  CHEST/LUNG: dec breath sounds at bases,  HEART:  S1 , S2 +  ABDOMEN: soft bs+  EXTREMITIES:  trace edema+  NEUROLOGY:alert awake     LABS:  06-26    132<L>  |  90<L>  |  12  ----------------------------<  99  3.5   |  28  |  2.47<H>    Ca    9.0      26 Jun 2018 02:58  Mg     2.1     06-26      Creatinine Trend: 2.47 <--, 3.84 <--, 4.44 <--, 6.21 <--, 4.68 <--, 6.67 <--, 6.08 <--, 4.72 <--, 4.12 <--                        7.9    9.34  )-----------( 266      ( 26 Jun 2018 02:58 )             25.9     Urine Studies:              PT/INR - ( 26 Jun 2018 02:58 )   PT: 14.9 SEC;   INR: 1.33          PTT - ( 26 Jun 2018 02:58 )  PTT:93.2 SEC

## 2018-06-26 NOTE — PROGRESS NOTE ADULT - ASSESSMENT
Echo - 9/17 - EF severely decreased  CXR w/ pulm edema    a/p     1) Acute on chronic systolic CHF - clinically stable, continue volume removal with HD, repeat echo with some improvement in LV function  mild troponin elevation likely sec to ESRD and CHF. cont with BB . no acei or arb due to borderline low bp  2) CAD s/p PCI - on ASA and plavix   3) ESRD on HD  4) bacteremia -iv abx, repeat bcx negative, echo with no obvious source  5) Afib - on IV heparin sec to GI bleed, f/u GI

## 2018-06-26 NOTE — PROGRESS NOTE ADULT - SUBJECTIVE AND OBJECTIVE BOX
Reid Martinez MD  Interventional Cardiology / Advance Heart Failure and Cardiac Transplant Specialist  [place] Office : [address] [place] [id number]  Tel:   [place] Office : [address] [facility] [address] [address]  Tel: [phone]  Cell : [phone]    Subjective : Pt lying in bed comfortable, not in distress, denies any chest pain or SOB  	  MEDICATIONS:  amiodarone    Tablet 200 milliGRAM(s) Oral daily  aspirin  chewable 81 milliGRAM(s) Oral daily  clopidogrel Tablet 75 milliGRAM(s) Oral daily  heparin  Infusion.  Unit(s)/Hr IV Continuous <Continuous>  heparin  Injectable 5000 Unit(s) IV Push every 6 hours PRN  heparin  Injectable 2500 Unit(s) IV Push every 6 hours PRN  metoprolol tartrate 25 milliGRAM(s) Oral two times a day  tamsulosin 0.4 milliGRAM(s) Oral at bedtime    ampicillin  IVPB 2 Gram(s) IV Intermittent every 12 hours    ALBUTerol/ipratropium for Nebulization 3 milliLiter(s) Nebulizer every 6 hours    acetaminophen  Suppository 650 milliGRAM(s) Rectal every 6 hours PRN  busPIRone 5 milliGRAM(s) Oral two times a day    pantoprazole    Tablet 40 milliGRAM(s) Oral before breakfast    atorvastatin 40 milliGRAM(s) Oral at bedtime  dextrose 40% Gel 15 Gram(s) Oral once PRN  dextrose 50% Injectable 12.5 Gram(s) IV Push once  dextrose 50% Injectable 25 Gram(s) IV Push once  dextrose 50% Injectable 25 Gram(s) IV Push once  glucagon  Injectable 1 milliGRAM(s) IntraMuscular once PRN  insulin lispro (HumaLOG) corrective regimen sliding scale   SubCutaneous three times a day before meals  insulin lispro (HumaLOG) corrective regimen sliding scale   SubCutaneous at bedtime    artificial  tears Solution 1 Drop(s) Both EYES four times a day  epoetin georgie Injectable 4000 Unit(s) IV Push <User Schedule>  multivitamin 1 Tablet(s) Oral daily      PHYSICAL EXAM:  T(C): 36.7 (06-26-18 @ 05:40), Max: 36.7 (06-26-18 @ 05:40)  HR: 76 (06-26-18 @ 15:48) (74 - 86)  BP: 121/62 (06-26-18 @ 05:40) (101/59 - 129/61)  RR: 18 (06-26-18 @ 05:40) (18 - 19)  SpO2: 96% (06-26-18 @ 15:00) (95% - 100%)  Wt(kg): --  I&O's Summary    25 Jun 2018 07:01  -  26 Jun 2018 07:00  --------------------------------------------------------  IN: 756 mL / OUT: 1400 mL / NET: -644 mL    26 Jun 2018 07:01  -  26 Jun 2018 16:08  --------------------------------------------------------  IN: 480 mL / OUT: 0 mL / NET: 480 mL          Appearance: Normal	  HEENT:   Normal oral mucosa, PERRL, EOMI	  Cardiovascular: Normal S1 S2, No JVD, No murmurs, No edema  Respiratory: Lungs clear to auscultation	  Gastrointestinal:  Soft, Non-tender, + BS	  Extremities: Normal range of motion, No clubbing, cyanosis or edema                                    7.9    9.34  )-----------( 266      ( 26 Jun 2018 02:58 )             25.9     06-26    132<L>  |  90<L>  |  12  ----------------------------<  99  3.5   |  28  |  2.47<H>    Ca    9.0      26 Jun 2018 02:58  Mg     2.1     06-26      proBNP:   Lipid Profile:   HgA1c:   TSH:

## 2018-06-26 NOTE — PROGRESS NOTE ADULT - SUBJECTIVE AND OBJECTIVE BOX
JIMI BUCHANAN:[id number],   72yMale followed for:  No Known Allergies    PAST MEDICAL & SURGICAL HISTORY:  CVA (cerebral vascular accident)  COPD (chronic obstructive pulmonary disease)  BPH (benign prostatic hyperplasia)  Bipolar affective disorder  CKD (chronic kidney disease)  Pancreatitis  Hypertension  Dyslipidemia  Diabetes mellitus  Coronary artery disease  HLD (hyperlipidemia)  Anemia  Acute renal failure  CKD (chronic kidney disease)  COPD (chronic obstructive pulmonary disease)  Fainting  Cardiac arrhythmia  Dizziness  Hydronephrosis  Cholecystitis  Bipolar 1 disorder  DM (diabetes mellitus)  HTN (hypertension)  Lumbar radiculopathy  Left heart failure  Reflux esophagitis  Coronary atherosclerosis  History of cardiac catheterization  No significant past surgical history    FAMILY HISTORY:  No pertinent family history in first degree relatives    MEDICATIONS  (STANDING):  ALBUTerol/ipratropium for Nebulization 3 milliLiter(s) Nebulizer every 6 hours  amiodarone    Tablet 200 milliGRAM(s) Oral daily  ampicillin  IVPB 2 Gram(s) IV Intermittent every 12 hours  artificial  tears Solution 1 Drop(s) Both EYES four times a day  aspirin  chewable 81 milliGRAM(s) Oral daily  atorvastatin 40 milliGRAM(s) Oral at bedtime  busPIRone 5 milliGRAM(s) Oral two times a day  clopidogrel Tablet 75 milliGRAM(s) Oral daily  dextrose 50% Injectable 12.5 Gram(s) IV Push once  dextrose 50% Injectable 25 Gram(s) IV Push once  dextrose 50% Injectable 25 Gram(s) IV Push once  epoetin georgie Injectable 4000 Unit(s) IV Push <User Schedule>  heparin  Infusion.  Unit(s)/Hr (11 mL/Hr) IV Continuous <Continuous>  insulin lispro (HumaLOG) corrective regimen sliding scale   SubCutaneous three times a day before meals  insulin lispro (HumaLOG) corrective regimen sliding scale   SubCutaneous at bedtime  metoprolol tartrate 25 milliGRAM(s) Oral two times a day  multivitamin 1 Tablet(s) Oral daily  pantoprazole    Tablet 40 milliGRAM(s) Oral before breakfast  tamsulosin 0.4 milliGRAM(s) Oral at bedtime    MEDICATIONS  (PRN):  acetaminophen  Suppository 650 milliGRAM(s) Rectal every 6 hours PRN For Temp greater than 38 C (100.4 F)  dextrose 40% Gel 15 Gram(s) Oral once PRN Blood Glucose LESS THAN 70 milliGRAM(s)/deciliter  glucagon  Injectable 1 milliGRAM(s) IntraMuscular once PRN Glucose LESS THAN 70 milligrams/deciliter  heparin  Injectable 5000 Unit(s) IV Push every 6 hours PRN For aPTT less than 40  heparin  Injectable 2500 Unit(s) IV Push every 6 hours PRN For aPTT between 40 - 57      Vital Signs Last 24 Hrs  T(C): 36.7 (26 Jun 2018 05:40), Max: 36.8 (25 Jun 2018 12:46)  T(F): 98 (26 Jun 2018 05:40), Max: 98.3 (25 Jun 2018 12:46)  HR: 75 (26 Jun 2018 05:40) (74 - 86)  BP: 121/62 (26 Jun 2018 05:40) (101/59 - 129/61)  BP(mean): 105 (25 Jun 2018 18:02) (105 - 105)  RR: 18 (26 Jun 2018 05:40) (18 - 19)  SpO2: 95% (26 Jun 2018 05:40) (95% - 100%)  nc/at  s1s2  cta  soft, nt, nd no guarding or rebound  no c/c/e    CBC Full  -  ( 26 Jun 2018 02:58 )  WBC Count : 9.34 K/uL  Hemoglobin : 7.9 g/dL  Hematocrit : 25.9 %  Platelet Count - Automated : 266 K/uL  Mean Cell Volume : 89.0 fL  Mean Cell Hemoglobin : 27.1 pg  Mean Cell Hemoglobin Concentration : 30.5 %  Auto Neutrophil # : x  Auto Lymphocyte # : x  Auto Monocyte # : x  Auto Eosinophil # : x  Auto Basophil # : x  Auto Neutrophil % : x  Auto Lymphocyte % : x  Auto Monocyte % : x  Auto Eosinophil % : x  Auto Basophil % : x    06-26    132<L>  |  90<L>  |  12  ----------------------------<  99  3.5   |  28  |  2.47<H>    Ca    9.0      26 Jun 2018 02:58  Mg     2.1     06-26      PT/INR - ( 26 Jun 2018 02:58 )   PT: 14.9 SEC;   INR: 1.33          PTT - ( 26 Jun 2018 02:58 )  PTT:93.2 SEC

## 2018-06-26 NOTE — PROGRESS NOTE ADULT - PROBLEM SELECTOR PLAN 1
Patient with h/o ESRD on HD; MWF schedule. Last HD was done on 6/25. Labs reviewed from today. No plan for HD today. Monitor BMP.

## 2018-06-26 NOTE — PROGRESS NOTE ADULT - SUBJECTIVE AND OBJECTIVE BOX
[facility] Division of Kidney Diseases & Hypertension  FOLLOW UP NOTE  [phone]--------------------------------------------------------------------------------     HPI: 71 y/o M with CAD s/p 12-14 stents placed in 1990's, and BABAR x 5 placed in Sept 2017, CHF with EF 20%, DM, HTN, HLD, AF on coumadin, CVA , ESRD on HD (MWF) COPD, bipolar disorder, dementia admitted for SOB. Last HD session was on 6/25/18.  Patient seen and examined at bedside; appears comfortable at this time and denies any complaints.     PAST HISTORY  --------------------------------------------------------------------------------  No significant changes to PMH, PSH, FHx, SHx, unless otherwise noted    ALLERGIES & MEDICATIONS  --------------------------------------------------------------------------------  Allergies    No Known Allergies    Intolerances      Standing Inpatient Medications  ALBUTerol/ipratropium for Nebulization 3 milliLiter(s) Nebulizer every 6 hours  amiodarone    Tablet 200 milliGRAM(s) Oral daily  ampicillin  IVPB 2 Gram(s) IV Intermittent every 12 hours  artificial  tears Solution 1 Drop(s) Both EYES four times a day  aspirin  chewable 81 milliGRAM(s) Oral daily  atorvastatin 40 milliGRAM(s) Oral at bedtime  busPIRone 5 milliGRAM(s) Oral two times a day  clopidogrel Tablet 75 milliGRAM(s) Oral daily  dextrose 50% Injectable 12.5 Gram(s) IV Push once  dextrose 50% Injectable 25 Gram(s) IV Push once  dextrose 50% Injectable 25 Gram(s) IV Push once  epoetin georgie Injectable 4000 Unit(s) IV Push <User Schedule>  heparin  Infusion.  Unit(s)/Hr IV Continuous <Continuous>  insulin lispro (HumaLOG) corrective regimen sliding scale   SubCutaneous three times a day before meals  insulin lispro (HumaLOG) corrective regimen sliding scale   SubCutaneous at bedtime  metoprolol tartrate 25 milliGRAM(s) Oral two times a day  multivitamin 1 Tablet(s) Oral daily  pantoprazole    Tablet 40 milliGRAM(s) Oral before breakfast  tamsulosin 0.4 milliGRAM(s) Oral at bedtime    PRN Inpatient Medications  acetaminophen  Suppository 650 milliGRAM(s) Rectal every 6 hours PRN  dextrose 40% Gel 15 Gram(s) Oral once PRN  glucagon  Injectable 1 milliGRAM(s) IntraMuscular once PRN  heparin  Injectable 5000 Unit(s) IV Push every 6 hours PRN  heparin  Injectable 2500 Unit(s) IV Push every 6 hours PRN      REVIEW OF SYSTEMS  --------------------------------------------------------------------------------    Unable to obtain.     VITALS/PHYSICAL EXAM  --------------------------------------------------------------------------------  T(C): 36.7 (06-26-18 @ 05:40), Max: 36.7 (06-26-18 @ 05:40)  HR: 74 (06-26-18 @ 15:00) (74 - 86)  BP: 121/62 (06-26-18 @ 05:40) (101/59 - 129/61)  RR: 18 (06-26-18 @ 05:40) (18 - 19)  SpO2: 96% (06-26-18 @ 15:00) (95% - 100%)  Wt(kg): --        06-25-18 @ 07:01  -  06-26-18 @ 07:00  --------------------------------------------------------  IN: 756 mL / OUT: 1400 mL / NET: -644 mL    06-26-18 @ 07:01  -  06-26-18 @ 15:02  --------------------------------------------------------  IN: 480 mL / OUT: 0 mL / NET: 480 mL      Physical Exam:  	     Gen: Elderly male, NAD  	HEENT: anicteric  	Pulm: Rales present over both lungs.   	CV:  S1S2 rrr  	Abd: +BS, soft   	Ext: No B/L Lower ext edema  	Neuro: minimally verbal, follows commands  	Skin: Warm, without rashes  	Vascular access: Right UE AVF present; thrill and bruit heard.       LABS/STUDIES  --------------------------------------------------------------------------------              7.9    9.34  >-----------<  266      [06-26-18 @ 02:58]              25.9     132  |  90  |  12  ----------------------------<  99      [06-26-18 @ 02:58]  3.5   |  28  |  2.47        Ca     9.0     [06-26-18 @ 02:58]      Mg     2.1     [06-26-18 @ 02:58]      PT/INR: PT 14.9 , INR 1.33       [06-26-18 @ 02:58]  PTT: 93.2       [06-26-18 @ 02:58]      Creatinine Trend:  SCr 2.47 [06-26 @ 02:58]  SCr 3.84 [06-25 @ 03:00]  SCr 4.44 [06-24 @ 03:45]  SCr 6.21 [06-23 @ 06:45]  SCr 4.68 [06-22 @ 06:30]        Iron 41, TIBC 157, %sat --      [06-20-18 @ 06:00]  Ferritin 2578      [06-20-18 @ 06:00]  PTH 76.54 (Ca --)      [06-20-18 @ 06:00]   --  HbA1c 6.6      [06-20-18 @ 06:00]    HBsAb <3.0      [06-19-18 @ 17:05]  HBsAg NEGATIVE      [06-19-18 @ 17:05]  HBcAb Nonreactive      [06-19-18 @ 17:05]  HCV 0.35, Nonreactive Hepatitis C AB  S/CO Ratio                        Interpretation  < 1.0                                     Non-Reactive  1.0 - 4.9                           Weakly-Reactive  > 5.0                                 Reactive  Non-Reactive: Aperson with a non-reactive HCV antibody  result is considered uninfected.  No further action is  needed unless recent infection is suspected.  In these  cases, consider repeat testing later to detect  seroconversion..  Weakly-Reactive: HCV antibody test is abnormal, HCV RNA  Qualitative test will follow.  Reactive: HCV antibody test is abnormal, HCV RNA  Qualitative test will follow.  Note: HCV antibody testing is performed on the [employer] system.      [06-19-18 @ 17:05] [facility] Division of Kidney Diseases & Hypertension  FOLLOW UP NOTE  [phone]--------------------------------------------------------------------------------    HPI: 73 y/o M with CAD s/p 12-14 stents placed in 1990's, and BABAR x 5 placed in Sept 2017, CHF with EF 20%, DM, HTN, HLD, AF on coumadin, CVA , ESRD on HD (MWF) COPD, bipolar disorder, dementia admitted for SOB. Last HD session was on 6/25/18.  Patient seen and examined at bedside; appears comfortable at this time and denies any complaints.     PAST HISTORY  --------------------------------------------------------------------------------  No significant changes to PMH, PSH, FHx, SHx, unless otherwise noted    ALLERGIES & MEDICATIONS  --------------------------------------------------------------------------------  Allergies    No Known Allergies    Intolerances      Standing Inpatient Medications  ALBUTerol/ipratropium for Nebulization 3 milliLiter(s) Nebulizer every 6 hours  amiodarone    Tablet 200 milliGRAM(s) Oral daily  ampicillin  IVPB 2 Gram(s) IV Intermittent every 12 hours  artificial  tears Solution 1 Drop(s) Both EYES four times a day  aspirin  chewable 81 milliGRAM(s) Oral daily  atorvastatin 40 milliGRAM(s) Oral at bedtime  busPIRone 5 milliGRAM(s) Oral two times a day  clopidogrel Tablet 75 milliGRAM(s) Oral daily  dextrose 50% Injectable 12.5 Gram(s) IV Push once  dextrose 50% Injectable 25 Gram(s) IV Push once  dextrose 50% Injectable 25 Gram(s) IV Push once  epoetin georgie Injectable 4000 Unit(s) IV Push <User Schedule>  heparin  Infusion.  Unit(s)/Hr IV Continuous <Continuous>  insulin lispro (HumaLOG) corrective regimen sliding scale   SubCutaneous three times a day before meals  insulin lispro (HumaLOG) corrective regimen sliding scale   SubCutaneous at bedtime  metoprolol tartrate 25 milliGRAM(s) Oral two times a day  multivitamin 1 Tablet(s) Oral daily  pantoprazole    Tablet 40 milliGRAM(s) Oral before breakfast  tamsulosin 0.4 milliGRAM(s) Oral at bedtime    PRN Inpatient Medications  acetaminophen  Suppository 650 milliGRAM(s) Rectal every 6 hours PRN  dextrose 40% Gel 15 Gram(s) Oral once PRN  glucagon  Injectable 1 milliGRAM(s) IntraMuscular once PRN  heparin  Injectable 5000 Unit(s) IV Push every 6 hours PRN  heparin  Injectable 2500 Unit(s) IV Push every 6 hours PRN      REVIEW OF SYSTEMS  --------------------------------------------------------------------------------    Unable to obtain.     VITALS/PHYSICAL EXAM  --------------------------------------------------------------------------------  T(C): 36.7 (06-26-18 @ 05:40), Max: 36.7 (06-26-18 @ 05:40)  HR: 74 (06-26-18 @ 15:00) (74 - 86)  BP: 121/62 (06-26-18 @ 05:40) (101/59 - 129/61)  RR: 18 (06-26-18 @ 05:40) (18 - 19)  SpO2: 96% (06-26-18 @ 15:00) (95% - 100%)  Wt(kg): --        06-25-18 @ 07:01  -  06-26-18 @ 07:00  --------------------------------------------------------  IN: 756 mL / OUT: 1400 mL / NET: -644 mL    06-26-18 @ 07:01  -  06-26-18 @ 15:02  --------------------------------------------------------  IN: 480 mL / OUT: 0 mL / NET: 480 mL      Physical Exam:  	     Gen: Elderly male, NAD  	HEENT: anicteric  	Pulm: lung exam improved CTA B/L today  	CV:  S1S2 rrr  	Abd: +BS, soft   	Ext: No B/L Lower ext edema  	Neuro: minimally verbal, follows commands  	Skin: Warm, without rashes  	Vascular access: Right UE AVF present; thrill and bruit heard.       LABS/STUDIES  --------------------------------------------------------------------------------              7.9    9.34  >-----------<  266      [06-26-18 @ 02:58]              25.9     132  |  90  |  12  ----------------------------<  99      [06-26-18 @ 02:58]  3.5   |  28  |  2.47        Ca     9.0     [06-26-18 @ 02:58]      Mg     2.1     [06-26-18 @ 02:58]      PT/INR: PT 14.9 , INR 1.33       [06-26-18 @ 02:58]  PTT: 93.2       [06-26-18 @ 02:58]      Creatinine Trend:  SCr 2.47 [06-26 @ 02:58]  SCr 3.84 [06-25 @ 03:00]  SCr 4.44 [06-24 @ 03:45]  SCr 6.21 [06-23 @ 06:45]  SCr 4.68 [06-22 @ 06:30]        Iron 41, TIBC 157, %sat --      [06-20-18 @ 06:00]  Ferritin 2578      [06-20-18 @ 06:00]  PTH 76.54 (Ca --)      [06-20-18 @ 06:00]   --  HbA1c 6.6      [06-20-18 @ 06:00]    HBsAb <3.0      [06-19-18 @ 17:05]  HBsAg NEGATIVE      [06-19-18 @ 17:05]  HBcAb Nonreactive      [06-19-18 @ 17:05]  HCV 0.35, Nonreactive Hepatitis C AB  S/CO Ratio                        Interpretation  < 1.0                                     Non-Reactive  1.0 - 4.9                           Weakly-Reactive  > 5.0                                 Reactive  Non-Reactive: Aperson with a non-reactive HCV antibody  result is considered uninfected.  No further action is  needed unless recent infection is suspected.  In these  cases, consider repeat testing later to detect  seroconversion..  Weakly-Reactive: HCV antibody test is abnormal, HCV RNA  Qualitative test will follow.  Reactive: HCV antibody test is abnormal, HCV RNA  Qualitative test will follow.  Note: HCV antibody testing is performed on the [employer] system.      [06-19-18 @ 17:05]

## 2018-06-27 DIAGNOSIS — R78.81 BACTEREMIA: ICD-10-CM

## 2018-06-27 LAB
APTT BLD: 83.4 SEC — HIGH (ref 27.5–37.4)
BASOPHILS # BLD AUTO: 0.03 K/UL — SIGNIFICANT CHANGE UP (ref 0–0.2)
BASOPHILS NFR BLD AUTO: 0.4 % — SIGNIFICANT CHANGE UP (ref 0–2)
BUN SERPL-MCNC: 28 MG/DL — HIGH (ref 7–23)
CALCIUM SERPL-MCNC: 9.4 MG/DL — SIGNIFICANT CHANGE UP (ref 8.4–10.5)
CHLORIDE SERPL-SCNC: 88 MMOL/L — LOW (ref 98–107)
CO2 SERPL-SCNC: 27 MMOL/L — SIGNIFICANT CHANGE UP (ref 22–31)
CREAT SERPL-MCNC: 4.59 MG/DL — HIGH (ref 0.5–1.3)
EOSINOPHIL # BLD AUTO: 0.38 K/UL — SIGNIFICANT CHANGE UP (ref 0–0.5)
EOSINOPHIL NFR BLD AUTO: 4.5 % — SIGNIFICANT CHANGE UP (ref 0–6)
GLUCOSE BLDC GLUCOMTR-MCNC: 125 MG/DL — HIGH (ref 70–99)
GLUCOSE BLDC GLUCOMTR-MCNC: 127 MG/DL — HIGH (ref 70–99)
GLUCOSE BLDC GLUCOMTR-MCNC: 131 MG/DL — HIGH (ref 70–99)
GLUCOSE BLDC GLUCOMTR-MCNC: 167 MG/DL — HIGH (ref 70–99)
GLUCOSE BLDC GLUCOMTR-MCNC: 215 MG/DL — HIGH (ref 70–99)
GLUCOSE BLDC GLUCOMTR-MCNC: 253 MG/DL — HIGH (ref 70–99)
GLUCOSE SERPL-MCNC: 136 MG/DL — HIGH (ref 70–99)
HCT VFR BLD CALC: 24.2 % — LOW (ref 39–50)
HCT VFR BLD CALC: 24.2 % — LOW (ref 39–50)
HGB BLD-MCNC: 7.3 G/DL — LOW (ref 13–17)
HGB BLD-MCNC: 7.3 G/DL — LOW (ref 13–17)
IMM GRANULOCYTES # BLD AUTO: 0.07 # — SIGNIFICANT CHANGE UP
IMM GRANULOCYTES NFR BLD AUTO: 0.8 % — SIGNIFICANT CHANGE UP (ref 0–1.5)
INR BLD: 1.3 — HIGH (ref 0.88–1.17)
LYMPHOCYTES # BLD AUTO: 2.16 K/UL — SIGNIFICANT CHANGE UP (ref 1–3.3)
LYMPHOCYTES # BLD AUTO: 25.6 % — SIGNIFICANT CHANGE UP (ref 13–44)
MAGNESIUM SERPL-MCNC: 2.1 MG/DL — SIGNIFICANT CHANGE UP (ref 1.6–2.6)
MCHC RBC-ENTMCNC: 28 PG — SIGNIFICANT CHANGE UP (ref 27–34)
MCHC RBC-ENTMCNC: 28 PG — SIGNIFICANT CHANGE UP (ref 27–34)
MCHC RBC-ENTMCNC: 30.2 % — LOW (ref 32–36)
MCHC RBC-ENTMCNC: 30.2 % — LOW (ref 32–36)
MCV RBC AUTO: 92.7 FL — SIGNIFICANT CHANGE UP (ref 80–100)
MCV RBC AUTO: 92.7 FL — SIGNIFICANT CHANGE UP (ref 80–100)
MONOCYTES # BLD AUTO: 0.62 K/UL — SIGNIFICANT CHANGE UP (ref 0–0.9)
MONOCYTES NFR BLD AUTO: 7.3 % — SIGNIFICANT CHANGE UP (ref 2–14)
NEUTROPHILS # BLD AUTO: 5.18 K/UL — SIGNIFICANT CHANGE UP (ref 1.8–7.4)
NEUTROPHILS NFR BLD AUTO: 61.4 % — SIGNIFICANT CHANGE UP (ref 43–77)
NRBC # FLD: 0 — SIGNIFICANT CHANGE UP
NRBC # FLD: 0 — SIGNIFICANT CHANGE UP
PLATELET # BLD AUTO: 259 K/UL — SIGNIFICANT CHANGE UP (ref 150–400)
PLATELET # BLD AUTO: 259 K/UL — SIGNIFICANT CHANGE UP (ref 150–400)
PMV BLD: 9.7 FL — SIGNIFICANT CHANGE UP (ref 7–13)
PMV BLD: 9.7 FL — SIGNIFICANT CHANGE UP (ref 7–13)
POTASSIUM SERPL-MCNC: 3.5 MMOL/L — SIGNIFICANT CHANGE UP (ref 3.5–5.3)
POTASSIUM SERPL-SCNC: 3.5 MMOL/L — SIGNIFICANT CHANGE UP (ref 3.5–5.3)
PROTHROM AB SERPL-ACNC: 15 SEC — HIGH (ref 9.8–13.1)
RBC # BLD: 2.61 M/UL — LOW (ref 4.2–5.8)
RBC # BLD: 2.61 M/UL — LOW (ref 4.2–5.8)
RBC # FLD: 14.8 % — HIGH (ref 10.3–14.5)
RBC # FLD: 14.8 % — HIGH (ref 10.3–14.5)
SODIUM SERPL-SCNC: 133 MMOL/L — LOW (ref 135–145)
WBC # BLD: 8.44 K/UL — SIGNIFICANT CHANGE UP (ref 3.8–10.5)
WBC # BLD: 8.44 K/UL — SIGNIFICANT CHANGE UP (ref 3.8–10.5)
WBC # FLD AUTO: 8.44 K/UL — SIGNIFICANT CHANGE UP (ref 3.8–10.5)
WBC # FLD AUTO: 8.44 K/UL — SIGNIFICANT CHANGE UP (ref 3.8–10.5)

## 2018-06-27 PROCEDURE — 99232 SBSQ HOSP IP/OBS MODERATE 35: CPT | Mod: GC

## 2018-06-27 RX ORDER — WARFARIN SODIUM 2.5 MG/1
2 TABLET ORAL ONCE
Qty: 0 | Refills: 0 | Status: COMPLETED | OUTPATIENT
Start: 2018-06-27 | End: 2018-06-27

## 2018-06-27 RX ADMIN — Medication 5 MILLIGRAM(S): at 17:58

## 2018-06-27 RX ADMIN — Medication 3 MILLILITER(S): at 21:09

## 2018-06-27 RX ADMIN — Medication 216 GRAM(S): at 17:58

## 2018-06-27 RX ADMIN — Medication 5 MILLIGRAM(S): at 06:10

## 2018-06-27 RX ADMIN — Medication 3 MILLILITER(S): at 15:14

## 2018-06-27 RX ADMIN — WARFARIN SODIUM 2 MILLIGRAM(S): 2.5 TABLET ORAL at 17:58

## 2018-06-27 RX ADMIN — Medication 81 MILLIGRAM(S): at 13:27

## 2018-06-27 RX ADMIN — HEPARIN SODIUM 1300 UNIT(S)/HR: 5000 INJECTION INTRAVENOUS; SUBCUTANEOUS at 08:44

## 2018-06-27 RX ADMIN — AMIODARONE HYDROCHLORIDE 200 MILLIGRAM(S): 400 TABLET ORAL at 06:10

## 2018-06-27 RX ADMIN — ERYTHROPOIETIN 4000 UNIT(S): 10000 INJECTION, SOLUTION INTRAVENOUS; SUBCUTANEOUS at 09:35

## 2018-06-27 RX ADMIN — ATORVASTATIN CALCIUM 40 MILLIGRAM(S): 80 TABLET, FILM COATED ORAL at 21:57

## 2018-06-27 RX ADMIN — Medication 216 GRAM(S): at 06:10

## 2018-06-27 RX ADMIN — TAMSULOSIN HYDROCHLORIDE 0.4 MILLIGRAM(S): 0.4 CAPSULE ORAL at 21:57

## 2018-06-27 RX ADMIN — Medication 1: at 12:57

## 2018-06-27 RX ADMIN — Medication 1 DROP(S): at 17:58

## 2018-06-27 RX ADMIN — Medication 3 MILLILITER(S): at 04:10

## 2018-06-27 RX ADMIN — CLOPIDOGREL BISULFATE 75 MILLIGRAM(S): 75 TABLET, FILM COATED ORAL at 13:27

## 2018-06-27 RX ADMIN — Medication 1 DROP(S): at 23:22

## 2018-06-27 RX ADMIN — Medication 25 MILLIGRAM(S): at 17:58

## 2018-06-27 RX ADMIN — Medication 1 TABLET(S): at 13:27

## 2018-06-27 NOTE — PROGRESS NOTE ADULT - SUBJECTIVE AND OBJECTIVE BOX
[facility] Division of Kidney Diseases & Hypertension  FOLLOW UP NOTE  [phone]--------------------------------------------------------------------------------    HPI: 71 y/o M with CAD s/p 12-14 stents placed in 1990's, and BABAR x 5 placed in Sept 2017, CHF with EF 20%, DM, HTN, HLD, AF on coumadin, CVA , ESRD on HD (MWF) COPD, bipolar disorder, dementia admitted for SOB. Last HD session was on 6/25/18.  Patient seen and examined at bedside; appears comfortable at this time.  PAST HISTORY  --------------------------------------------------------------------------------  No significant changes to PMH, PSH, FHx, SHx, unless otherwise noted    ALLERGIES & MEDICATIONS  --------------------------------------------------------------------------------  Allergies    No Known Allergies    Intolerances      Standing Inpatient Medications  ALBUTerol/ipratropium for Nebulization 3 milliLiter(s) Nebulizer every 6 hours  amiodarone    Tablet 200 milliGRAM(s) Oral daily  ampicillin  IVPB 2 Gram(s) IV Intermittent every 12 hours  artificial  tears Solution 1 Drop(s) Both EYES four times a day  aspirin  chewable 81 milliGRAM(s) Oral daily  atorvastatin 40 milliGRAM(s) Oral at bedtime  busPIRone 5 milliGRAM(s) Oral two times a day  clopidogrel Tablet 75 milliGRAM(s) Oral daily  dextrose 50% Injectable 12.5 Gram(s) IV Push once  dextrose 50% Injectable 25 Gram(s) IV Push once  dextrose 50% Injectable 25 Gram(s) IV Push once  epoetin georgie Injectable 4000 Unit(s) IV Push <User Schedule>  heparin  Infusion.  Unit(s)/Hr IV Continuous <Continuous>  insulin lispro (HumaLOG) corrective regimen sliding scale   SubCutaneous three times a day before meals  insulin lispro (HumaLOG) corrective regimen sliding scale   SubCutaneous at bedtime  metoprolol tartrate 25 milliGRAM(s) Oral two times a day  multivitamin 1 Tablet(s) Oral daily  pantoprazole    Tablet 40 milliGRAM(s) Oral before breakfast  tamsulosin 0.4 milliGRAM(s) Oral at bedtime  warfarin 2 milliGRAM(s) Oral once    PRN Inpatient Medications  acetaminophen  Suppository 650 milliGRAM(s) Rectal every 6 hours PRN  dextrose 40% Gel 15 Gram(s) Oral once PRN  glucagon  Injectable 1 milliGRAM(s) IntraMuscular once PRN  heparin  Injectable 5000 Unit(s) IV Push every 6 hours PRN  heparin  Injectable 2500 Unit(s) IV Push every 6 hours PRN      REVIEW OF SYSTEMS  --------------------------------------------------------------------------------    Unable to obtain.    VITALS/PHYSICAL EXAM  --------------------------------------------------------------------------------  T(C): 36.7 (06-27-18 @ 12:40), Max: 37.1 (06-26-18 @ 22:53)  HR: 83 (06-27-18 @ 12:40) (71 - 83)  BP: 112/84 (06-27-18 @ 12:40) (112/84 - 132/63)  RR: 17 (06-27-18 @ 12:40) (16 - 18)  SpO2: 96% (06-27-18 @ 12:40) (96% - 98%)  Wt(kg): --        06-26-18 @ 07:01  -  06-27-18 @ 07:00  --------------------------------------------------------  IN: 480 mL / OUT: 0 mL / NET: 480 mL    06-27-18 @ 07:01  -  06-27-18 @ 14:44  --------------------------------------------------------  IN: 400 mL / OUT: 1400 mL / NET: -1000 mL      Physical Exam:  	  Gen: Elderly male, NAD  	HEENT: anicteric  	Pulm: clear on auscultation.   	CV:  S1S2 rrr  	Abd: +BS, soft   	Ext: No B/L Lower ext edema  	Neuro: minimally verbal, follows commands  	Skin: Warm, without rashes  	Vascular access: Right UE AVF present; thrill and bruit heard.     LABS/STUDIES  --------------------------------------------------------------------------------              7.3    8.44  >-----------<  259      [06-27-18 @ 06:47]              24.2     133  |  88  |  28  ----------------------------<  136      [06-27-18 @ 06:47]  3.5   |  27  |  4.59        Ca     9.4     [06-27-18 @ 06:47]      Mg     2.1     [06-27-18 @ 06:47]      PT/INR: PT 15.0 , INR 1.30       [06-27-18 @ 06:47]  PTT: 83.4       [06-27-18 @ 06:47]      Creatinine Trend:  SCr 4.59 [06-27 @ 06:47]  SCr 2.47 [06-26 @ 02:58]  SCr 3.84 [06-25 @ 03:00]  SCr 4.44 [06-24 @ 03:45]  SCr 6.21 [06-23 @ 06:45] [facility] Division of Kidney Diseases & Hypertension  FOLLOW UP NOTE  [phone]--------------------------------------------------------------------------------    HPI: 71 y/o M with CAD s/p 12-14 stents placed in 1990's, and BABAR x 5 placed in Sept 2017, CHF with EF 20%, DM, HTN, HLD, AF on coumadin, CVA , ESRD on HD (MWF) COPD, bipolar disorder, dementia admitted for SOB. Last HD session was on 6/25/18.  Patient seen and examined at bedside; appears comfortable at this time.  PAST HISTORY  --------------------------------------------------------------------------------  No significant changes to PMH, PSH, FHx, SHx, unless otherwise noted    ALLERGIES & MEDICATIONS  --------------------------------------------------------------------------------  Allergies    No Known Allergies    Intolerances      Standing Inpatient Medications  ALBUTerol/ipratropium for Nebulization 3 milliLiter(s) Nebulizer every 6 hours  amiodarone    Tablet 200 milliGRAM(s) Oral daily  ampicillin  IVPB 2 Gram(s) IV Intermittent every 12 hours  artificial  tears Solution 1 Drop(s) Both EYES four times a day  aspirin  chewable 81 milliGRAM(s) Oral daily  atorvastatin 40 milliGRAM(s) Oral at bedtime  busPIRone 5 milliGRAM(s) Oral two times a day  clopidogrel Tablet 75 milliGRAM(s) Oral daily  dextrose 50% Injectable 12.5 Gram(s) IV Push once  dextrose 50% Injectable 25 Gram(s) IV Push once  dextrose 50% Injectable 25 Gram(s) IV Push once  epoetin georgie Injectable 4000 Unit(s) IV Push <User Schedule>  heparin  Infusion.  Unit(s)/Hr IV Continuous <Continuous>  insulin lispro (HumaLOG) corrective regimen sliding scale   SubCutaneous three times a day before meals  insulin lispro (HumaLOG) corrective regimen sliding scale   SubCutaneous at bedtime  metoprolol tartrate 25 milliGRAM(s) Oral two times a day  multivitamin 1 Tablet(s) Oral daily  pantoprazole    Tablet 40 milliGRAM(s) Oral before breakfast  tamsulosin 0.4 milliGRAM(s) Oral at bedtime  warfarin 2 milliGRAM(s) Oral once    PRN Inpatient Medications  acetaminophen  Suppository 650 milliGRAM(s) Rectal every 6 hours PRN  dextrose 40% Gel 15 Gram(s) Oral once PRN  glucagon  Injectable 1 milliGRAM(s) IntraMuscular once PRN  heparin  Injectable 5000 Unit(s) IV Push every 6 hours PRN  heparin  Injectable 2500 Unit(s) IV Push every 6 hours PRN      REVIEW OF SYSTEMS  --------------------------------------------------------------------------------    Unable to obtain.    VITALS/PHYSICAL EXAM  --------------------------------------------------------------------------------  T(C): 36.7 (06-27-18 @ 12:40), Max: 37.1 (06-26-18 @ 22:53)  HR: 83 (06-27-18 @ 12:40) (71 - 83)  BP: 112/84 (06-27-18 @ 12:40) (112/84 - 132/63)  RR: 17 (06-27-18 @ 12:40) (16 - 18)  SpO2: 96% (06-27-18 @ 12:40) (96% - 98%)  Wt(kg): --        06-26-18 @ 07:01  -  06-27-18 @ 07:00  --------------------------------------------------------  IN: 480 mL / OUT: 0 mL / NET: 480 mL    06-27-18 @ 07:01  -  06-27-18 @ 14:44  --------------------------------------------------------  IN: 400 mL / OUT: 1400 mL / NET: -1000 mL      Physical Exam:  	              Gen: Elderly male, NAD  	HEENT: anicteric  	Pulm: clear on auscultation.   	CV:  S1S2 rrr  	Abd: +BS, soft   	Ext: No B/L Lower ext edema  	Neuro: minimally verbal, follows commands  	Skin: Warm, without rashes  	Vascular access: Right UE AVF present; thrill and bruit heard.     LABS/STUDIES  --------------------------------------------------------------------------------              7.3    8.44  >-----------<  259      [06-27-18 @ 06:47]              24.2     133  |  88  |  28  ----------------------------<  136      [06-27-18 @ 06:47]  3.5   |  27  |  4.59        Ca     9.4     [06-27-18 @ 06:47]      Mg     2.1     [06-27-18 @ 06:47]      PT/INR: PT 15.0 , INR 1.30       [06-27-18 @ 06:47]  PTT: 83.4       [06-27-18 @ 06:47]      Creatinine Trend:  SCr 4.59 [06-27 @ 06:47]  SCr 2.47 [06-26 @ 02:58]  SCr 3.84 [06-25 @ 03:00]  SCr 4.44 [06-24 @ 03:45]  SCr 6.21 [06-23 @ 06:45]

## 2018-06-27 NOTE — PROGRESS NOTE ADULT - SUBJECTIVE AND OBJECTIVE BOX
JIMI BUCHANAN:[id number],   72yMale followed for: rectal thickening  No Known Allergies    PAST MEDICAL & SURGICAL HISTORY:  CVA (cerebral vascular accident)  COPD (chronic obstructive pulmonary disease)  BPH (benign prostatic hyperplasia)  Bipolar affective disorder  CKD (chronic kidney disease)  Pancreatitis  Hypertension  Dyslipidemia  Diabetes mellitus  Coronary artery disease  HLD (hyperlipidemia)  Anemia  Acute renal failure  CKD (chronic kidney disease)  COPD (chronic obstructive pulmonary disease)  Fainting  Cardiac arrhythmia  Dizziness  Hydronephrosis  Cholecystitis  Bipolar 1 disorder  DM (diabetes mellitus)  HTN (hypertension)  Lumbar radiculopathy  Left heart failure  Reflux esophagitis  Coronary atherosclerosis  History of cardiac catheterization  No significant past surgical history    FAMILY HISTORY:  No pertinent family history in first degree relatives    MEDICATIONS  (STANDING):  ALBUTerol/ipratropium for Nebulization 3 milliLiter(s) Nebulizer every 6 hours  amiodarone    Tablet 200 milliGRAM(s) Oral daily  ampicillin  IVPB 2 Gram(s) IV Intermittent every 12 hours  artificial  tears Solution 1 Drop(s) Both EYES four times a day  aspirin  chewable 81 milliGRAM(s) Oral daily  atorvastatin 40 milliGRAM(s) Oral at bedtime  busPIRone 5 milliGRAM(s) Oral two times a day  clopidogrel Tablet 75 milliGRAM(s) Oral daily  dextrose 50% Injectable 12.5 Gram(s) IV Push once  dextrose 50% Injectable 25 Gram(s) IV Push once  dextrose 50% Injectable 25 Gram(s) IV Push once  epoetin georgie Injectable 4000 Unit(s) IV Push <User Schedule>  heparin  Infusion.  Unit(s)/Hr (11 mL/Hr) IV Continuous <Continuous>  insulin lispro (HumaLOG) corrective regimen sliding scale   SubCutaneous three times a day before meals  insulin lispro (HumaLOG) corrective regimen sliding scale   SubCutaneous at bedtime  metoprolol tartrate 25 milliGRAM(s) Oral two times a day  multivitamin 1 Tablet(s) Oral daily  pantoprazole    Tablet 40 milliGRAM(s) Oral before breakfast  tamsulosin 0.4 milliGRAM(s) Oral at bedtime    MEDICATIONS  (PRN):  acetaminophen  Suppository 650 milliGRAM(s) Rectal every 6 hours PRN For Temp greater than 38 C (100.4 F)  dextrose 40% Gel 15 Gram(s) Oral once PRN Blood Glucose LESS THAN 70 milliGRAM(s)/deciliter  glucagon  Injectable 1 milliGRAM(s) IntraMuscular once PRN Glucose LESS THAN 70 milligrams/deciliter  heparin  Injectable 5000 Unit(s) IV Push every 6 hours PRN For aPTT less than 40  heparin  Injectable 2500 Unit(s) IV Push every 6 hours PRN For aPTT between 40 - 57      Vital Signs Last 24 Hrs  T(C): 37.1 (27 Jun 2018 07:20), Max: 37.1 (26 Jun 2018 22:53)  T(F): 98.8 (27 Jun 2018 07:20), Max: 98.8 (26 Jun 2018 22:53)  HR: 71 (27 Jun 2018 07:20) (71 - 78)  BP: 132/63 (27 Jun 2018 07:20) (117/61 - 132/63)  BP(mean): --  RR: 18 (27 Jun 2018 07:20) (18 - 18)  SpO2: 97% (27 Jun 2018 06:05) (96% - 98%)  nc/at  s1s2  cta  soft, nt, nd no guarding or rebound  no c/c/e    CBC Full  -  ( 27 Jun 2018 06:47 )  WBC Count : 8.44 K/uL  Hemoglobin : 7.3 g/dL  Hematocrit : 24.2 %  Platelet Count - Automated : 259 K/uL  Mean Cell Volume : 92.7 fL  Mean Cell Hemoglobin : 28.0 pg  Mean Cell Hemoglobin Concentration : 30.2 %  Auto Neutrophil # : 5.18 K/uL  Auto Lymphocyte # : 2.16 K/uL  Auto Monocyte # : 0.62 K/uL  Auto Eosinophil # : 0.38 K/uL  Auto Basophil # : 0.03 K/uL  Auto Neutrophil % : 61.4 %  Auto Lymphocyte % : 25.6 %  Auto Monocyte % : 7.3 %  Auto Eosinophil % : 4.5 %  Auto Basophil % : 0.4 %    06-27    133<L>  |  88<L>  |  28<H>  ----------------------------<  136<H>  3.5   |  27  |  4.59<H>    Ca    9.4      27 Jun 2018 06:47  Mg     2.1     06-27      PT/INR - ( 27 Jun 2018 06:47 )   PT: 15.0 SEC;   INR: 1.30          PTT - ( 27 Jun 2018 06:47 )  PTT:83.4 SEC

## 2018-06-27 NOTE — PROGRESS NOTE ADULT - SUBJECTIVE AND OBJECTIVE BOX
Reid Martinez MD  Interventional Cardiology / Advance Heart Failure and Cardiac Transplant Specialist  [place] Office : [address] [place] [address]  Tel:   [place] Office : [address] [facility] [address] [address]  Tel: [phone]  Cell : [phone]    Subjective : Pt lying in bed comfortable, not in distress, denies any chest pain or SOB  	  MEDICATIONS:  amiodarone    Tablet 200 milliGRAM(s) Oral daily  aspirin  chewable 81 milliGRAM(s) Oral daily  clopidogrel Tablet 75 milliGRAM(s) Oral daily  heparin  Infusion.  Unit(s)/Hr IV Continuous <Continuous>  heparin  Injectable 5000 Unit(s) IV Push every 6 hours PRN  heparin  Injectable 2500 Unit(s) IV Push every 6 hours PRN  metoprolol tartrate 25 milliGRAM(s) Oral two times a day  tamsulosin 0.4 milliGRAM(s) Oral at bedtime    ampicillin  IVPB 2 Gram(s) IV Intermittent every 12 hours    ALBUTerol/ipratropium for Nebulization 3 milliLiter(s) Nebulizer every 6 hours    acetaminophen  Suppository 650 milliGRAM(s) Rectal every 6 hours PRN  busPIRone 5 milliGRAM(s) Oral two times a day    pantoprazole    Tablet 40 milliGRAM(s) Oral before breakfast    atorvastatin 40 milliGRAM(s) Oral at bedtime  dextrose 40% Gel 15 Gram(s) Oral once PRN  dextrose 50% Injectable 12.5 Gram(s) IV Push once  dextrose 50% Injectable 25 Gram(s) IV Push once  dextrose 50% Injectable 25 Gram(s) IV Push once  glucagon  Injectable 1 milliGRAM(s) IntraMuscular once PRN  insulin lispro (HumaLOG) corrective regimen sliding scale   SubCutaneous three times a day before meals  insulin lispro (HumaLOG) corrective regimen sliding scale   SubCutaneous at bedtime    artificial  tears Solution 1 Drop(s) Both EYES four times a day  epoetin georgie Injectable 4000 Unit(s) IV Push <User Schedule>  multivitamin 1 Tablet(s) Oral daily      PHYSICAL EXAM:  T(C): 36.7 (06-27-18 @ 12:40), Max: 37.1 (06-26-18 @ 22:53)  HR: 83 (06-27-18 @ 12:40) (71 - 83)  BP: 112/84 (06-27-18 @ 12:40) (112/84 - 132/63)  RR: 17 (06-27-18 @ 12:40) (16 - 18)  SpO2: 96% (06-27-18 @ 12:40) (96% - 98%)  Wt(kg): --  I&O's Summary    26 Jun 2018 07:01  -  27 Jun 2018 07:00  --------------------------------------------------------  IN: 480 mL / OUT: 0 mL / NET: 480 mL    27 Jun 2018 07:01  -  27 Jun 2018 13:03  --------------------------------------------------------  IN: 400 mL / OUT: 1400 mL / NET: -1000 mL          Appearance: Normal	  HEENT:   Normal oral mucosa, PERRL, EOMI	  Cardiovascular: Normal S1 S2, No JVD, No murmurs, No edema  Respiratory: Lungs clear to auscultation	  Gastrointestinal:  Soft, Non-tender, + BS	  Extremities: Normal range of motion, No clubbing, cyanosis or edema                                    7.3    8.44  )-----------( 259      ( 27 Jun 2018 06:47 )             24.2     06-27    133<L>  |  88<L>  |  28<H>  ----------------------------<  136<H>  3.5   |  27  |  4.59<H>    Ca    9.4      27 Jun 2018 06:47  Mg     2.1     06-27      proBNP:   Lipid Profile:   HgA1c:   TSH:

## 2018-06-27 NOTE — PROGRESS NOTE ADULT - PROBLEM SELECTOR PLAN 1
< from: CT Abdomen and Pelvis w/ IV Cont (06.22.18 @ 10:41) Atrophic kidneys with cortical scarring and heterogeneous right renal   enhancement, question pyelonephritis. Correlate with urinalysis/culture.  Circumferential mild rectal wall thickening. Correlate for signs of   proctitis.Bladder stone.Bilateral pleural effusions.   as per ID ampicillin 2 gm iv q 12 for 14 days of abx with ampicillin through 7/4/2018,

## 2018-06-27 NOTE — PROGRESS NOTE ADULT - SUBJECTIVE AND OBJECTIVE BOX
chief complaint : dyspnea        SUBJECTIVE / OVERNIGHT EVENTS: pt appears comfortable, denies chest pain, shortness of breath , nausea, v    MEDICATIONS  (STANDING):  ALBUTerol/ipratropium for Nebulization 3 milliLiter(s) Nebulizer every 6 hours  amiodarone    Tablet 200 milliGRAM(s) Oral daily  ampicillin  IVPB 2 Gram(s) IV Intermittent every 12 hours  artificial  tears Solution 1 Drop(s) Both EYES four times a day  aspirin  chewable 81 milliGRAM(s) Oral daily  atorvastatin 40 milliGRAM(s) Oral at bedtime  busPIRone 5 milliGRAM(s) Oral two times a day  clopidogrel Tablet 75 milliGRAM(s) Oral daily  dextrose 50% Injectable 12.5 Gram(s) IV Push once  dextrose 50% Injectable 25 Gram(s) IV Push once  dextrose 50% Injectable 25 Gram(s) IV Push once  epoetin georgie Injectable 4000 Unit(s) IV Push <User Schedule>  heparin  Infusion.  Unit(s)/Hr (11 mL/Hr) IV Continuous <Continuous>  insulin lispro (HumaLOG) corrective regimen sliding scale   SubCutaneous three times a day before meals  insulin lispro (HumaLOG) corrective regimen sliding scale   SubCutaneous at bedtime  metoprolol tartrate 25 milliGRAM(s) Oral two times a day  multivitamin 1 Tablet(s) Oral daily  pantoprazole    Tablet 40 milliGRAM(s) Oral before breakfast  tamsulosin 0.4 milliGRAM(s) Oral at bedtime    MEDICATIONS  (PRN):  acetaminophen  Suppository 650 milliGRAM(s) Rectal every 6 hours PRN For Temp greater than 38 C (100.4 F)  dextrose 40% Gel 15 Gram(s) Oral once PRN Blood Glucose LESS THAN 70 milliGRAM(s)/deciliter  glucagon  Injectable 1 milliGRAM(s) IntraMuscular once PRN Glucose LESS THAN 70 milligrams/deciliter  heparin  Injectable 5000 Unit(s) IV Push every 6 hours PRN For aPTT less than 40  heparin  Injectable 2500 Unit(s) IV Push every 6 hours PRN For aPTT between 40 - 57    Vital Signs Last 24 Hrs  T(C): 36.7 (27 Jun 2018 12:40), Max: 37.1 (26 Jun 2018 22:53)  T(F): 98 (27 Jun 2018 12:40), Max: 98.8 (26 Jun 2018 22:53)  HR: 78 (27 Jun 2018 15:15) (71 - 83)  BP: 112/84 (27 Jun 2018 12:40) (112/84 - 132/63)  BP(mean): --  RR: 17 (27 Jun 2018 12:40) (16 - 18)  SpO2: 95% (27 Jun 2018 15:15) (95% - 98%)    Constitutional: No fever, fatigue  Skin: No rash.  Eyes: No recent vision problems or eye pain.  ENT: No congestion, ear pain, or sore throat.  Cardiovascular: No chest pain or palpation.  Respiratory: No cough, shortness of breath, congestion, or wheezing.  Gastrointestinal: No abdominal pain, nausea, vomiting, or diarrhea.  Genitourinary: No dysuria.  Musculoskeletal: No joint swelling.  Neurologic: No headache.    PHYSICAL EXAM:  GENERAL: NAD  EYES: EOMI, PERRLA  NECK: Supple, No JVD  CHEST/LUNG: dec breath sounds at bases,  HEART:  S1 , S2 +  ABDOMEN: soft bs+  EXTREMITIES:  trace edema+  NEUROLOGY:alert awake     LABS:  06-27    133<L>  |  88<L>  |  28<H>  ----------------------------<  136<H>  3.5   |  27  |  4.59<H>    Ca    9.4      27 Jun 2018 06:47  Mg     2.1     06-27      Creatinine Trend: 4.59 <--, 2.47 <--, 3.84 <--, 4.44 <--, 6.21 <--, 4.68 <--, 6.67 <--, 6.08 <--                        7.3    8.44  )-----------( 259      ( 27 Jun 2018 06:47 )             24.2     Urine Studies:              PT/INR - ( 27 Jun 2018 06:47 )   PT: 15.0 SEC;   INR: 1.30          PTT - ( 27 Jun 2018 06:47 )  PTT:83.4 SEC

## 2018-06-27 NOTE — PROGRESS NOTE ADULT - ASSESSMENT
continue current rx.  rectal thickening.  should have colonoscopy at some point, but ? risk> benefit.  check cea

## 2018-06-28 LAB
APTT BLD: 92.3 SEC — HIGH (ref 27.5–37.4)
BACTERIA BLD CULT: SIGNIFICANT CHANGE UP
BACTERIA BLD CULT: SIGNIFICANT CHANGE UP
BASOPHILS # BLD AUTO: 0.03 K/UL — SIGNIFICANT CHANGE UP (ref 0–0.2)
BASOPHILS NFR BLD AUTO: 0.3 % — SIGNIFICANT CHANGE UP (ref 0–2)
BUN SERPL-MCNC: 16 MG/DL — SIGNIFICANT CHANGE UP (ref 7–23)
CALCIUM SERPL-MCNC: 9.4 MG/DL — SIGNIFICANT CHANGE UP (ref 8.4–10.5)
CHLORIDE SERPL-SCNC: 97 MMOL/L — LOW (ref 98–107)
CO2 SERPL-SCNC: 27 MMOL/L — SIGNIFICANT CHANGE UP (ref 22–31)
CREAT SERPL-MCNC: 3.48 MG/DL — HIGH (ref 0.5–1.3)
EOSINOPHIL # BLD AUTO: 0.24 K/UL — SIGNIFICANT CHANGE UP (ref 0–0.5)
EOSINOPHIL NFR BLD AUTO: 2.7 % — SIGNIFICANT CHANGE UP (ref 0–6)
GLUCOSE BLDC GLUCOMTR-MCNC: 156 MG/DL — HIGH (ref 70–99)
GLUCOSE BLDC GLUCOMTR-MCNC: 171 MG/DL — HIGH (ref 70–99)
GLUCOSE BLDC GLUCOMTR-MCNC: 180 MG/DL — HIGH (ref 70–99)
GLUCOSE BLDC GLUCOMTR-MCNC: 229 MG/DL — HIGH (ref 70–99)
GLUCOSE SERPL-MCNC: 128 MG/DL — HIGH (ref 70–99)
HCT VFR BLD CALC: 25.3 % — LOW (ref 39–50)
HCT VFR BLD CALC: 25.3 % — LOW (ref 39–50)
HGB BLD-MCNC: 7.5 G/DL — LOW (ref 13–17)
HGB BLD-MCNC: 7.5 G/DL — LOW (ref 13–17)
IMM GRANULOCYTES # BLD AUTO: 0.06 # — SIGNIFICANT CHANGE UP
IMM GRANULOCYTES NFR BLD AUTO: 0.7 % — SIGNIFICANT CHANGE UP (ref 0–1.5)
INR BLD: 1.39 — HIGH (ref 0.88–1.17)
LYMPHOCYTES # BLD AUTO: 2.66 K/UL — SIGNIFICANT CHANGE UP (ref 1–3.3)
LYMPHOCYTES # BLD AUTO: 29.9 % — SIGNIFICANT CHANGE UP (ref 13–44)
MAGNESIUM SERPL-MCNC: 2.1 MG/DL — SIGNIFICANT CHANGE UP (ref 1.6–2.6)
MCHC RBC-ENTMCNC: 28.2 PG — SIGNIFICANT CHANGE UP (ref 27–34)
MCHC RBC-ENTMCNC: 28.2 PG — SIGNIFICANT CHANGE UP (ref 27–34)
MCHC RBC-ENTMCNC: 29.6 % — LOW (ref 32–36)
MCHC RBC-ENTMCNC: 29.6 % — LOW (ref 32–36)
MCV RBC AUTO: 95.1 FL — SIGNIFICANT CHANGE UP (ref 80–100)
MCV RBC AUTO: 95.1 FL — SIGNIFICANT CHANGE UP (ref 80–100)
MONOCYTES # BLD AUTO: 0.66 K/UL — SIGNIFICANT CHANGE UP (ref 0–0.9)
MONOCYTES NFR BLD AUTO: 7.4 % — SIGNIFICANT CHANGE UP (ref 2–14)
NEUTROPHILS # BLD AUTO: 5.24 K/UL — SIGNIFICANT CHANGE UP (ref 1.8–7.4)
NEUTROPHILS NFR BLD AUTO: 59 % — SIGNIFICANT CHANGE UP (ref 43–77)
NRBC # FLD: 0 — SIGNIFICANT CHANGE UP
NRBC # FLD: 0 — SIGNIFICANT CHANGE UP
PLATELET # BLD AUTO: 256 K/UL — SIGNIFICANT CHANGE UP (ref 150–400)
PLATELET # BLD AUTO: 256 K/UL — SIGNIFICANT CHANGE UP (ref 150–400)
PMV BLD: 9.7 FL — SIGNIFICANT CHANGE UP (ref 7–13)
PMV BLD: 9.7 FL — SIGNIFICANT CHANGE UP (ref 7–13)
POTASSIUM SERPL-MCNC: 3.7 MMOL/L — SIGNIFICANT CHANGE UP (ref 3.5–5.3)
POTASSIUM SERPL-SCNC: 3.7 MMOL/L — SIGNIFICANT CHANGE UP (ref 3.5–5.3)
PROTHROM AB SERPL-ACNC: 15.5 SEC — HIGH (ref 9.8–13.1)
RBC # BLD: 2.66 M/UL — LOW (ref 4.2–5.8)
RBC # BLD: 2.66 M/UL — LOW (ref 4.2–5.8)
RBC # FLD: 14.9 % — HIGH (ref 10.3–14.5)
RBC # FLD: 14.9 % — HIGH (ref 10.3–14.5)
SODIUM SERPL-SCNC: 141 MMOL/L — SIGNIFICANT CHANGE UP (ref 135–145)
WBC # BLD: 8.89 K/UL — SIGNIFICANT CHANGE UP (ref 3.8–10.5)
WBC # BLD: 8.89 K/UL — SIGNIFICANT CHANGE UP (ref 3.8–10.5)
WBC # FLD AUTO: 8.89 K/UL — SIGNIFICANT CHANGE UP (ref 3.8–10.5)
WBC # FLD AUTO: 8.89 K/UL — SIGNIFICANT CHANGE UP (ref 3.8–10.5)

## 2018-06-28 PROCEDURE — 99232 SBSQ HOSP IP/OBS MODERATE 35: CPT

## 2018-06-28 RX ORDER — WARFARIN SODIUM 2.5 MG/1
2 TABLET ORAL ONCE
Qty: 0 | Refills: 0 | Status: COMPLETED | OUTPATIENT
Start: 2018-06-28 | End: 2018-06-28

## 2018-06-28 RX ADMIN — Medication 3 MILLILITER(S): at 09:03

## 2018-06-28 RX ADMIN — Medication 216 GRAM(S): at 06:37

## 2018-06-28 RX ADMIN — Medication 81 MILLIGRAM(S): at 09:49

## 2018-06-28 RX ADMIN — Medication 25 MILLIGRAM(S): at 06:37

## 2018-06-28 RX ADMIN — Medication 3 MILLILITER(S): at 15:47

## 2018-06-28 RX ADMIN — CLOPIDOGREL BISULFATE 75 MILLIGRAM(S): 75 TABLET, FILM COATED ORAL at 09:49

## 2018-06-28 RX ADMIN — TAMSULOSIN HYDROCHLORIDE 0.4 MILLIGRAM(S): 0.4 CAPSULE ORAL at 22:01

## 2018-06-28 RX ADMIN — Medication 1: at 13:15

## 2018-06-28 RX ADMIN — Medication 5 MILLIGRAM(S): at 18:22

## 2018-06-28 RX ADMIN — Medication 3 MILLILITER(S): at 21:06

## 2018-06-28 RX ADMIN — WARFARIN SODIUM 2 MILLIGRAM(S): 2.5 TABLET ORAL at 18:22

## 2018-06-28 RX ADMIN — PANTOPRAZOLE SODIUM 40 MILLIGRAM(S): 20 TABLET, DELAYED RELEASE ORAL at 06:37

## 2018-06-28 RX ADMIN — Medication 3 MILLILITER(S): at 03:56

## 2018-06-28 RX ADMIN — Medication 216 GRAM(S): at 18:22

## 2018-06-28 RX ADMIN — Medication 5 MILLIGRAM(S): at 06:46

## 2018-06-28 RX ADMIN — Medication 2: at 09:40

## 2018-06-28 RX ADMIN — Medication 25 MILLIGRAM(S): at 18:22

## 2018-06-28 RX ADMIN — Medication 1: at 18:22

## 2018-06-28 RX ADMIN — AMIODARONE HYDROCHLORIDE 200 MILLIGRAM(S): 400 TABLET ORAL at 06:37

## 2018-06-28 RX ADMIN — Medication 1 TABLET(S): at 09:49

## 2018-06-28 RX ADMIN — ATORVASTATIN CALCIUM 40 MILLIGRAM(S): 80 TABLET, FILM COATED ORAL at 22:01

## 2018-06-28 NOTE — PROGRESS NOTE ADULT - SUBJECTIVE AND OBJECTIVE BOX
JIMI BUCHANAN:[id number],   72yMale followed for:  No Known Allergies    PAST MEDICAL & SURGICAL HISTORY:  CVA (cerebral vascular accident)  COPD (chronic obstructive pulmonary disease)  BPH (benign prostatic hyperplasia)  Bipolar affective disorder  CKD (chronic kidney disease)  Pancreatitis  Hypertension  Dyslipidemia  Diabetes mellitus  Coronary artery disease  HLD (hyperlipidemia)  Anemia  Acute renal failure  CKD (chronic kidney disease)  COPD (chronic obstructive pulmonary disease)  Fainting  Cardiac arrhythmia  Dizziness  Hydronephrosis  Cholecystitis  Bipolar 1 disorder  DM (diabetes mellitus)  HTN (hypertension)  Lumbar radiculopathy  Left heart failure  Reflux esophagitis  Coronary atherosclerosis  History of cardiac catheterization  No significant past surgical history    FAMILY HISTORY:  No pertinent family history in first degree relatives    MEDICATIONS  (STANDING):  ALBUTerol/ipratropium for Nebulization 3 milliLiter(s) Nebulizer every 6 hours  amiodarone    Tablet 200 milliGRAM(s) Oral daily  ampicillin  IVPB 2 Gram(s) IV Intermittent every 12 hours  artificial  tears Solution 1 Drop(s) Both EYES four times a day  aspirin  chewable 81 milliGRAM(s) Oral daily  atorvastatin 40 milliGRAM(s) Oral at bedtime  busPIRone 5 milliGRAM(s) Oral two times a day  clopidogrel Tablet 75 milliGRAM(s) Oral daily  dextrose 50% Injectable 12.5 Gram(s) IV Push once  dextrose 50% Injectable 25 Gram(s) IV Push once  dextrose 50% Injectable 25 Gram(s) IV Push once  epoetin georgie Injectable 4000 Unit(s) IV Push <User Schedule>  heparin  Infusion.  Unit(s)/Hr (11 mL/Hr) IV Continuous <Continuous>  insulin lispro (HumaLOG) corrective regimen sliding scale   SubCutaneous three times a day before meals  insulin lispro (HumaLOG) corrective regimen sliding scale   SubCutaneous at bedtime  metoprolol tartrate 25 milliGRAM(s) Oral two times a day  multivitamin 1 Tablet(s) Oral daily  pantoprazole    Tablet 40 milliGRAM(s) Oral before breakfast  tamsulosin 0.4 milliGRAM(s) Oral at bedtime    MEDICATIONS  (PRN):  acetaminophen  Suppository 650 milliGRAM(s) Rectal every 6 hours PRN For Temp greater than 38 C (100.4 F)  dextrose 40% Gel 15 Gram(s) Oral once PRN Blood Glucose LESS THAN 70 milliGRAM(s)/deciliter  glucagon  Injectable 1 milliGRAM(s) IntraMuscular once PRN Glucose LESS THAN 70 milligrams/deciliter  heparin  Injectable 5000 Unit(s) IV Push every 6 hours PRN For aPTT less than 40  heparin  Injectable 2500 Unit(s) IV Push every 6 hours PRN For aPTT between 40 - 57      Vital Signs Last 24 Hrs  T(C): 37.1 (28 Jun 2018 05:45), Max: 37.1 (28 Jun 2018 05:45)  T(F): 98.7 (28 Jun 2018 05:45), Max: 98.7 (28 Jun 2018 05:45)  HR: 74 (28 Jun 2018 09:03) (66 - 83)  BP: 107/47 (28 Jun 2018 05:45) (107/47 - 127/62)  BP(mean): --  RR: 16 (28 Jun 2018 05:45) (16 - 17)  SpO2: 96% (28 Jun 2018 09:03) (95% - 100%)  nc/at  s1s2  cta  soft, nt, nd no guarding or rebound  no c/c/e    CBC Full  -  ( 28 Jun 2018 06:15 )  WBC Count : 8.89 K/uL  Hemoglobin : 7.5 g/dL  Hematocrit : 25.3 %  Platelet Count - Automated : 256 K/uL  Mean Cell Volume : 95.1 fL  Mean Cell Hemoglobin : 28.2 pg  Mean Cell Hemoglobin Concentration : 29.6 %  Auto Neutrophil # : 5.24 K/uL  Auto Lymphocyte # : 2.66 K/uL  Auto Monocyte # : 0.66 K/uL  Auto Eosinophil # : 0.24 K/uL  Auto Basophil # : 0.03 K/uL  Auto Neutrophil % : 59.0 %  Auto Lymphocyte % : 29.9 %  Auto Monocyte % : 7.4 %  Auto Eosinophil % : 2.7 %  Auto Basophil % : 0.3 %    06-28    141  |  97<L>  |  16  ----------------------------<  128<H>  3.7   |  27  |  3.48<H>    Ca    9.4      28 Jun 2018 06:15  Mg     2.1     06-28      PT/INR - ( 28 Jun 2018 06:15 )   PT: 15.5 SEC;   INR: 1.39          PTT - ( 28 Jun 2018 06:15 )  PTT:92.3 SEC

## 2018-06-28 NOTE — PROGRESS NOTE ADULT - ASSESSMENT
no acute gi complaints.  proctiits on ct.  risk>> benefit endosopic evaluatino now but should be considered in future.

## 2018-06-28 NOTE — PHYSICAL THERAPY INITIAL EVALUATION ADULT - PASSIVE RANGE OF MOTION EXAMINATION, REHAB EVAL
no Passive ROM deficits were identified/except right knee -10 degrees from full extension due to hamstring tightness

## 2018-06-28 NOTE — PROGRESS NOTE ADULT - ASSESSMENT
72 year old with esrd and CHF presents with shortness of breath and hypoxia.   HD via AV fistula  He improved after diuresis with furosemide.  Had leukocytosis and fever.     enterococcal bacteremia cleared  CT with ? pyelo. likely urinary source  Echo demonstrates no evidence of endocarditis    Suggest:  Continue ampicillin 2 gm iv q 12 for 14 days of abx with ampicillin through 7/4/2018,

## 2018-06-28 NOTE — PROGRESS NOTE ADULT - SUBJECTIVE AND OBJECTIVE BOX
Follow Up:      Interval History/ROS: listless, fatigued - want to go home    Allergies  No Known Allergies    ANTIMICROBIALS:  ampicillin  IVPB 2 every 12 hours      OTHER MEDS:  MEDICATIONS  (STANDING):  acetaminophen  Suppository 650 every 6 hours PRN  ALBUTerol/ipratropium for Nebulization 3 every 6 hours  amiodarone    Tablet 200 daily  aspirin  chewable 81 daily  atorvastatin 40 at bedtime  busPIRone 5 two times a day  clopidogrel Tablet 75 daily  dextrose 40% Gel 15 once PRN  dextrose 50% Injectable 12.5 once  dextrose 50% Injectable 25 once  dextrose 50% Injectable 25 once  epoetin georgie Injectable 4000 <User Schedule>  glucagon  Injectable 1 once PRN  heparin  Infusion.  <Continuous>  heparin  Injectable 5000 every 6 hours PRN  heparin  Injectable 2500 every 6 hours PRN  insulin lispro (HumaLOG) corrective regimen sliding scale  three times a day before meals  insulin lispro (HumaLOG) corrective regimen sliding scale  at bedtime  metoprolol tartrate 25 two times a day  pantoprazole    Tablet 40 before breakfast  tamsulosin 0.4 at bedtime      Vital Signs Last 24 Hrs  T(C): 36.9 (28 Jun 2018 12:53), Max: 37.1 (28 Jun 2018 05:45)  T(F): 98.5 (28 Jun 2018 12:53), Max: 98.7 (28 Jun 2018 05:45)  HR: 66 (28 Jun 2018 19:08) (66 - 78)  BP: 117/60 (28 Jun 2018 18:00) (107/47 - 117/60)  BP(mean): --  RR: 16 (28 Jun 2018 12:53) (16 - 16)  SpO2: 98% (28 Jun 2018 19:08) (95% - 100%)    PHYSICAL EXAM:  General: WN/WD NAD, Non-toxic  Neurology: A&Ox3, nonfocal  Respiratory: Clear to auscultation bilaterally  CV: RRR, S1S2, no murmurs, rubs or gallops  Abdominal: Soft, Non-tender, non-distended, normal bowel sounds  Extremities: No edema  Line Sites: Clear  Skin: No rash               7.5    8.89  )-----------( 256      ( 28 Jun 2018 06:15 )             25.3       06-28    141  |  97<L>  |  16  ----------------------------<  128<H>  3.7   |  27  |  3.48<H>    Ca    9.4      28 Jun 2018 06:15  Mg     2.1     06-28      MICROBIOLOGY:  BLOOD PERIPHERAL  06-23-18 --  --  --      BLOOD PERIPHERAL  06-21-18 --  --  --      BLOOD PERIPHERAL  06-20-18 --  --  BLOOD CULTURE PCR  Enterococcus faecalis      RADIOLOGY:  < from: Xray Chest 1 View- PORTABLE-Urgent (06.24.18 @ 13:01) >  Heart size and the mediastinum cannot be accurately evaluated on this   projection.  Accentuation and indistinctness of pulmonary vascular markings and right   lower lung airspace opacity is again noted, not significantly changed   allowing for different patient position.  No definite pleural effusion seen. No pneumothorax noted.    < end of copied text >      Eleazar Melgoza MD; Division of Infectious Disease; Pager: [phone]; nights and weekends: [phone]

## 2018-06-28 NOTE — PROGRESS NOTE ADULT - ASSESSMENT
Echo - 9/17 - EF severely decreased  CXR w/ pulm edema    a/p     1) Acute on chronic systolic CHF - clinically stable, continue volume removal with HD, repeat echo with some improvement in LV function  mild troponin elevation likely sec to ESRD and CHF. cont with BB . no acei or arb due to borderline low bp  2) CAD s/p PCI - on ASA and plavix   3) ESRD on HD  4) bacteremia -iv abx, repeat bcx negative, echo with no obvious source  5) Afib - on IV heparin sec to GI bleed, as per GI conservative managemnt resume coumadin

## 2018-06-28 NOTE — PROGRESS NOTE ADULT - SUBJECTIVE AND OBJECTIVE BOX
Reid Martinez MD  Interventional Cardiology / Advance Heart Failure and Cardiac Transplant Specialist  [place] Office : [address] [place] [address]. [address]  Tel:   [place] Office : [address] [facility] [place] [address]  Tel: [phone]  Cell : [phone]    Subjective : Pt lying in bed comfortable, not in distress, denies any chest pain or SOB  	  MEDICATIONS:  amiodarone    Tablet 200 milliGRAM(s) Oral daily  aspirin  chewable 81 milliGRAM(s) Oral daily  clopidogrel Tablet 75 milliGRAM(s) Oral daily  heparin  Infusion.  Unit(s)/Hr IV Continuous <Continuous>  heparin  Injectable 5000 Unit(s) IV Push every 6 hours PRN  heparin  Injectable 2500 Unit(s) IV Push every 6 hours PRN  metoprolol tartrate 25 milliGRAM(s) Oral two times a day  tamsulosin 0.4 milliGRAM(s) Oral at bedtime  warfarin 2 milliGRAM(s) Oral once    ampicillin  IVPB 2 Gram(s) IV Intermittent every 12 hours    ALBUTerol/ipratropium for Nebulization 3 milliLiter(s) Nebulizer every 6 hours    acetaminophen  Suppository 650 milliGRAM(s) Rectal every 6 hours PRN  busPIRone 5 milliGRAM(s) Oral two times a day    pantoprazole    Tablet 40 milliGRAM(s) Oral before breakfast    atorvastatin 40 milliGRAM(s) Oral at bedtime  dextrose 40% Gel 15 Gram(s) Oral once PRN  dextrose 50% Injectable 12.5 Gram(s) IV Push once  dextrose 50% Injectable 25 Gram(s) IV Push once  dextrose 50% Injectable 25 Gram(s) IV Push once  glucagon  Injectable 1 milliGRAM(s) IntraMuscular once PRN  insulin lispro (HumaLOG) corrective regimen sliding scale   SubCutaneous three times a day before meals  insulin lispro (HumaLOG) corrective regimen sliding scale   SubCutaneous at bedtime    artificial  tears Solution 1 Drop(s) Both EYES four times a day  epoetin georgie Injectable 4000 Unit(s) IV Push <User Schedule>  multivitamin 1 Tablet(s) Oral daily      PHYSICAL EXAM:  T(C): 36.9 (06-28-18 @ 12:53), Max: 37.1 (06-28-18 @ 05:45)  HR: 70 (06-28-18 @ 15:48) (66 - 78)  BP: 115/53 (06-28-18 @ 12:53) (107/47 - 115/53)  RR: 16 (06-28-18 @ 12:53) (16 - 16)  SpO2: 95% (06-28-18 @ 15:48) (95% - 100%)  Wt(kg): --  I&O's Summary    27 Jun 2018 07:01  -  28 Jun 2018 07:00  --------------------------------------------------------  IN: 520 mL / OUT: 1400 mL / NET: -880 mL    28 Jun 2018 07:01  -  28 Jun 2018 16:41  --------------------------------------------------------  IN: 240 mL / OUT: 0 mL / NET: 240 mL          Appearance: Normal	  HEENT:   Normal oral mucosa, PERRL, EOMI	  Cardiovascular: Normal S1 S2, No JVD, No murmurs, No edema  Respiratory: Lungs clear to auscultation	  Gastrointestinal:  Soft, Non-tender, + BS	  Extremities: Normal range of motion, No clubbing, cyanosis or edema                                    7.5    8.89  )-----------( 256      ( 28 Jun 2018 06:15 )             25.3     06-28    141  |  97<L>  |  16  ----------------------------<  128<H>  3.7   |  27  |  3.48<H>    Ca    9.4      28 Jun 2018 06:15  Mg     2.1     06-28      proBNP:   Lipid Profile:   HgA1c:   TSH:

## 2018-06-28 NOTE — PROGRESS NOTE ADULT - SUBJECTIVE AND OBJECTIVE BOX
chief complaint : dyspnea        SUBJECTIVE / OVERNIGHT EVENTS: pt appears comfortable, denies chest pain, shortness of breath , nausea, v    MEDICATIONS  (STANDING):  ALBUTerol/ipratropium for Nebulization 3 milliLiter(s) Nebulizer every 6 hours  amiodarone    Tablet 200 milliGRAM(s) Oral daily  ampicillin  IVPB 2 Gram(s) IV Intermittent every 12 hours  artificial  tears Solution 1 Drop(s) Both EYES four times a day  aspirin  chewable 81 milliGRAM(s) Oral daily  atorvastatin 40 milliGRAM(s) Oral at bedtime  busPIRone 5 milliGRAM(s) Oral two times a day  clopidogrel Tablet 75 milliGRAM(s) Oral daily  dextrose 50% Injectable 12.5 Gram(s) IV Push once  dextrose 50% Injectable 25 Gram(s) IV Push once  dextrose 50% Injectable 25 Gram(s) IV Push once  epoetin georgie Injectable 4000 Unit(s) IV Push <User Schedule>  heparin  Infusion.  Unit(s)/Hr (11 mL/Hr) IV Continuous <Continuous>  insulin lispro (HumaLOG) corrective regimen sliding scale   SubCutaneous three times a day before meals  insulin lispro (HumaLOG) corrective regimen sliding scale   SubCutaneous at bedtime  metoprolol tartrate 25 milliGRAM(s) Oral two times a day  multivitamin 1 Tablet(s) Oral daily  pantoprazole    Tablet 40 milliGRAM(s) Oral before breakfast  tamsulosin 0.4 milliGRAM(s) Oral at bedtime    MEDICATIONS  (PRN):  acetaminophen  Suppository 650 milliGRAM(s) Rectal every 6 hours PRN For Temp greater than 38 C (100.4 F)  dextrose 40% Gel 15 Gram(s) Oral once PRN Blood Glucose LESS THAN 70 milliGRAM(s)/deciliter  glucagon  Injectable 1 milliGRAM(s) IntraMuscular once PRN Glucose LESS THAN 70 milligrams/deciliter  heparin  Injectable 5000 Unit(s) IV Push every 6 hours PRN For aPTT less than 40  heparin  Injectable 2500 Unit(s) IV Push every 6 hours PRN For aPTT between 40 - 57    Vital Signs Last 24 Hrs  T(C): 36.9 (28 Jun 2018 12:53), Max: 37.1 (28 Jun 2018 05:45)  T(F): 98.5 (28 Jun 2018 12:53), Max: 98.7 (28 Jun 2018 05:45)  HR: 70 (28 Jun 2018 15:48) (66 - 78)  BP: 115/53 (28 Jun 2018 12:53) (107/47 - 115/53)  BP(mean): --  RR: 16 (28 Jun 2018 12:53) (16 - 16)  SpO2: 95% (28 Jun 2018 15:48) (95% - 100%)    Constitutional: No fever, fatigue  Skin: No rash.  Eyes: No recent vision problems or eye pain.  ENT: No congestion, ear pain, or sore throat.  Cardiovascular: No chest pain or palpation.  Respiratory: No cough, shortness of breath, congestion, or wheezing.  Gastrointestinal: No abdominal pain, nausea, vomiting, or diarrhea.  Genitourinary: No dysuria.  Musculoskeletal: No joint swelling.  Neurologic: No headache.    PHYSICAL EXAM:  GENERAL: NAD  EYES: EOMI, PERRLA  NECK: Supple, No JVD  CHEST/LUNG: dec breath sounds at bases,  HEART:  S1 , S2 +  ABDOMEN: soft bs+  EXTREMITIES:  trace edema+  NEUROLOGY:alert awake     LABS:  06-28    141  |  97<L>  |  16  ----------------------------<  128<H>  3.7   |  27  |  3.48<H>    Ca    9.4      28 Jun 2018 06:15  Mg     2.1     06-28      Creatinine Trend: 3.48 <--, 4.59 <--, 2.47 <--, 3.84 <--, 4.44 <--, 6.21 <--, 4.68 <--                        7.5    8.89  )-----------( 256      ( 28 Jun 2018 06:15 )             25.3     Urine Studies:              PT/INR - ( 28 Jun 2018 06:15 )   PT: 15.5 SEC;   INR: 1.39          PTT - ( 28 Jun 2018 06:15 )  PTT:92.3 SEC

## 2018-06-29 LAB
APTT BLD: 75.1 SEC — HIGH (ref 27.5–37.4)
APTT BLD: 80.7 SEC — HIGH (ref 27.5–37.4)
APTT BLD: > 200 SEC — CRITICAL HIGH (ref 27.5–37.4)
BASOPHILS # BLD AUTO: 0.03 K/UL — SIGNIFICANT CHANGE UP (ref 0–0.2)
BASOPHILS NFR BLD AUTO: 0.3 % — SIGNIFICANT CHANGE UP (ref 0–2)
BUN SERPL-MCNC: 27 MG/DL — HIGH (ref 7–23)
CALCIUM SERPL-MCNC: 9.4 MG/DL — SIGNIFICANT CHANGE UP (ref 8.4–10.5)
CEA SERPL-MCNC: 21.9 NG/ML — HIGH (ref 1–3.8)
CHLORIDE SERPL-SCNC: 90 MMOL/L — LOW (ref 98–107)
CO2 SERPL-SCNC: 21 MMOL/L — LOW (ref 22–31)
CREAT SERPL-MCNC: 4.85 MG/DL — HIGH (ref 0.5–1.3)
EOSINOPHIL # BLD AUTO: 0.22 K/UL — SIGNIFICANT CHANGE UP (ref 0–0.5)
EOSINOPHIL NFR BLD AUTO: 2.5 % — SIGNIFICANT CHANGE UP (ref 0–6)
GLUCOSE BLDC GLUCOMTR-MCNC: 125 MG/DL — HIGH (ref 70–99)
GLUCOSE BLDC GLUCOMTR-MCNC: 130 MG/DL — HIGH (ref 70–99)
GLUCOSE BLDC GLUCOMTR-MCNC: 134 MG/DL — HIGH (ref 70–99)
GLUCOSE BLDC GLUCOMTR-MCNC: 178 MG/DL — HIGH (ref 70–99)
GLUCOSE BLDC GLUCOMTR-MCNC: 194 MG/DL — HIGH (ref 70–99)
GLUCOSE BLDC GLUCOMTR-MCNC: 228 MG/DL — HIGH (ref 70–99)
GLUCOSE SERPL-MCNC: 177 MG/DL — HIGH (ref 70–99)
HCT VFR BLD CALC: 26.9 % — LOW (ref 39–50)
HCT VFR BLD CALC: 26.9 % — LOW (ref 39–50)
HGB BLD-MCNC: 8.2 G/DL — LOW (ref 13–17)
HGB BLD-MCNC: 8.2 G/DL — LOW (ref 13–17)
IMM GRANULOCYTES # BLD AUTO: 0.07 # — SIGNIFICANT CHANGE UP
IMM GRANULOCYTES NFR BLD AUTO: 0.8 % — SIGNIFICANT CHANGE UP (ref 0–1.5)
INR BLD: 1.38 — HIGH (ref 0.88–1.17)
LYMPHOCYTES # BLD AUTO: 1.75 K/UL — SIGNIFICANT CHANGE UP (ref 1–3.3)
LYMPHOCYTES # BLD AUTO: 20.1 % — SIGNIFICANT CHANGE UP (ref 13–44)
MAGNESIUM SERPL-MCNC: 2.2 MG/DL — SIGNIFICANT CHANGE UP (ref 1.6–2.6)
MCHC RBC-ENTMCNC: 28.1 PG — SIGNIFICANT CHANGE UP (ref 27–34)
MCHC RBC-ENTMCNC: 28.1 PG — SIGNIFICANT CHANGE UP (ref 27–34)
MCHC RBC-ENTMCNC: 30.5 % — LOW (ref 32–36)
MCHC RBC-ENTMCNC: 30.5 % — LOW (ref 32–36)
MCV RBC AUTO: 92.1 FL — SIGNIFICANT CHANGE UP (ref 80–100)
MCV RBC AUTO: 92.1 FL — SIGNIFICANT CHANGE UP (ref 80–100)
MONOCYTES # BLD AUTO: 0.5 K/UL — SIGNIFICANT CHANGE UP (ref 0–0.9)
MONOCYTES NFR BLD AUTO: 5.7 % — SIGNIFICANT CHANGE UP (ref 2–14)
NEUTROPHILS # BLD AUTO: 6.14 K/UL — SIGNIFICANT CHANGE UP (ref 1.8–7.4)
NEUTROPHILS NFR BLD AUTO: 70.6 % — SIGNIFICANT CHANGE UP (ref 43–77)
NRBC # FLD: 0 — SIGNIFICANT CHANGE UP
NRBC # FLD: 0 — SIGNIFICANT CHANGE UP
PLATELET # BLD AUTO: 248 K/UL — SIGNIFICANT CHANGE UP (ref 150–400)
PLATELET # BLD AUTO: 248 K/UL — SIGNIFICANT CHANGE UP (ref 150–400)
PMV BLD: 9.6 FL — SIGNIFICANT CHANGE UP (ref 7–13)
PMV BLD: 9.6 FL — SIGNIFICANT CHANGE UP (ref 7–13)
POTASSIUM SERPL-MCNC: 4.1 MMOL/L — SIGNIFICANT CHANGE UP (ref 3.5–5.3)
POTASSIUM SERPL-SCNC: 4.1 MMOL/L — SIGNIFICANT CHANGE UP (ref 3.5–5.3)
PROTHROM AB SERPL-ACNC: 16 SEC — HIGH (ref 9.8–13.1)
RBC # BLD: 2.92 M/UL — LOW (ref 4.2–5.8)
RBC # BLD: 2.92 M/UL — LOW (ref 4.2–5.8)
RBC # FLD: 15.1 % — HIGH (ref 10.3–14.5)
RBC # FLD: 15.1 % — HIGH (ref 10.3–14.5)
SODIUM SERPL-SCNC: 135 MMOL/L — SIGNIFICANT CHANGE UP (ref 135–145)
WBC # BLD: 8.71 K/UL — SIGNIFICANT CHANGE UP (ref 3.8–10.5)
WBC # BLD: 8.71 K/UL — SIGNIFICANT CHANGE UP (ref 3.8–10.5)
WBC # FLD AUTO: 8.71 K/UL — SIGNIFICANT CHANGE UP (ref 3.8–10.5)
WBC # FLD AUTO: 8.71 K/UL — SIGNIFICANT CHANGE UP (ref 3.8–10.5)

## 2018-06-29 PROCEDURE — 99232 SBSQ HOSP IP/OBS MODERATE 35: CPT | Mod: GC

## 2018-06-29 PROCEDURE — 99232 SBSQ HOSP IP/OBS MODERATE 35: CPT

## 2018-06-29 RX ORDER — WARFARIN SODIUM 2.5 MG/1
3 TABLET ORAL ONCE
Qty: 0 | Refills: 0 | Status: COMPLETED | OUTPATIENT
Start: 2018-06-29 | End: 2018-06-29

## 2018-06-29 RX ADMIN — HEPARIN SODIUM 1100 UNIT(S)/HR: 5000 INJECTION INTRAVENOUS; SUBCUTANEOUS at 12:36

## 2018-06-29 RX ADMIN — Medication 5 MILLIGRAM(S): at 17:48

## 2018-06-29 RX ADMIN — Medication 3 MILLILITER(S): at 22:58

## 2018-06-29 RX ADMIN — ERYTHROPOIETIN 4000 UNIT(S): 10000 INJECTION, SOLUTION INTRAVENOUS; SUBCUTANEOUS at 09:13

## 2018-06-29 RX ADMIN — TAMSULOSIN HYDROCHLORIDE 0.4 MILLIGRAM(S): 0.4 CAPSULE ORAL at 21:32

## 2018-06-29 RX ADMIN — Medication 1 DROP(S): at 23:20

## 2018-06-29 RX ADMIN — Medication 2: at 17:48

## 2018-06-29 RX ADMIN — Medication 216 GRAM(S): at 04:31

## 2018-06-29 RX ADMIN — Medication 25 MILLIGRAM(S): at 17:48

## 2018-06-29 RX ADMIN — AMIODARONE HYDROCHLORIDE 200 MILLIGRAM(S): 400 TABLET ORAL at 04:31

## 2018-06-29 RX ADMIN — Medication 3 MILLILITER(S): at 03:05

## 2018-06-29 RX ADMIN — HEPARIN SODIUM 1100 UNIT(S)/HR: 5000 INJECTION INTRAVENOUS; SUBCUTANEOUS at 19:02

## 2018-06-29 RX ADMIN — Medication 1 TABLET(S): at 17:47

## 2018-06-29 RX ADMIN — HEPARIN SODIUM 1100 UNIT(S)/HR: 5000 INJECTION INTRAVENOUS; SUBCUTANEOUS at 06:01

## 2018-06-29 RX ADMIN — ATORVASTATIN CALCIUM 40 MILLIGRAM(S): 80 TABLET, FILM COATED ORAL at 21:32

## 2018-06-29 RX ADMIN — PANTOPRAZOLE SODIUM 40 MILLIGRAM(S): 20 TABLET, DELAYED RELEASE ORAL at 04:31

## 2018-06-29 RX ADMIN — Medication 5 MILLIGRAM(S): at 04:31

## 2018-06-29 RX ADMIN — Medication 81 MILLIGRAM(S): at 17:48

## 2018-06-29 RX ADMIN — WARFARIN SODIUM 3 MILLIGRAM(S): 2.5 TABLET ORAL at 17:48

## 2018-06-29 RX ADMIN — Medication 3 MILLILITER(S): at 16:00

## 2018-06-29 RX ADMIN — HEPARIN SODIUM 0 UNIT(S)/HR: 5000 INJECTION INTRAVENOUS; SUBCUTANEOUS at 05:00

## 2018-06-29 RX ADMIN — Medication 216 GRAM(S): at 17:58

## 2018-06-29 RX ADMIN — CLOPIDOGREL BISULFATE 75 MILLIGRAM(S): 75 TABLET, FILM COATED ORAL at 17:48

## 2018-06-29 NOTE — PROGRESS NOTE ADULT - SUBJECTIVE AND OBJECTIVE BOX
Follow Up:      Interval History/ROS: resting comfortably    Allergies  No Known Allergies  ANTIMICROBIALS:  ampicillin  IVPB 2 every 12 hours    OTHER MEDS:  MEDICATIONS  (STANDING):  acetaminophen  Suppository 650 every 6 hours PRN  ALBUTerol/ipratropium for Nebulization 3 every 6 hours  amiodarone    Tablet 200 daily  aspirin  chewable 81 daily  atorvastatin 40 at bedtime  busPIRone 5 two times a day  clopidogrel Tablet 75 daily  dextrose 40% Gel 15 once PRN  dextrose 50% Injectable 12.5 once  dextrose 50% Injectable 25 once  dextrose 50% Injectable 25 once  epoetin georgie Injectable 4000 <User Schedule>  glucagon  Injectable 1 once PRN  heparin  Infusion.  <Continuous>  heparin  Injectable 5000 every 6 hours PRN  heparin  Injectable 2500 every 6 hours PRN  insulin lispro (HumaLOG) corrective regimen sliding scale  three times a day before meals  insulin lispro (HumaLOG) corrective regimen sliding scale  at bedtime  metoprolol tartrate 25 two times a day  pantoprazole    Tablet 40 before breakfast  tamsulosin 0.4 at bedtime      Vital Signs Last 24 Hrs  T(C): 36.9 (29 Jun 2018 13:22), Max: 36.9 (29 Jun 2018 13:22)  T(F): 98.4 (29 Jun 2018 13:22), Max: 98.4 (29 Jun 2018 13:22)  HR: 76 (29 Jun 2018 17:40) (61 - 94)  BP: 112/57 (29 Jun 2018 17:40) (100/43 - 132/68)  BP(mean): --  RR: 18 (29 Jun 2018 13:22) (18 - 26)  SpO2: 99% (29 Jun 2018 16:00) (89% - 100%)    PHYSICAL EXAM:  General: WN/WD NAD, Non-toxic  Respiratory: no resp distress  Abdominal: Soft, Non-tender, non-distended  Extremities: No edema,   Line Sites: Clear  Skin: No rash                        8.2    8.71  )-----------( 248      ( 29 Jun 2018 03:38 )             26.9       06-29    135  |  90<L>  |  27<H>  ----------------------------<  177<H>  4.1   |  21<L>  |  4.85<H>    Ca    9.4      29 Jun 2018 03:38  Mg     2.2     06-29      MICROBIOLOGY:  BLOOD PERIPHERAL  06-23-18 --  --  --      BLOOD PERIPHERAL  06-21-18 --  --  --      BLOOD PERIPHERAL  06-20-18 --  --  BLOOD CULTURE PCR  Enterococcus faecalis          v          RADIOLOGY:    Eleazar Melgoza MD; Division of Infectious Disease; Pager: [phone]; nights and weekends: [phone]

## 2018-06-29 NOTE — DIETITIAN INITIAL EVALUATION ADULT. - NS AS NUTRI INTERV MEALS SNACK
Other (specify)/Diets modified for specific foods and ingredients/1. Suggest: PO diet rx: Dysphagia 2 Mechanical Soft-Thin Liquids; Consistent Carbohydrate (no snacks), Renal Replacement (no prot restr, no conc K, no conc phos, low sodium);               2. Encourage & assist Pt with meals; Monitor PO diet tolerance; Honor food preferences;               3. Suggest: Swallow Bedside Assessment Adult &/or MBS if indicated;            4. Monitor labs, daily weights, hydration status;/Texture-modified diet

## 2018-06-29 NOTE — PROGRESS NOTE ADULT - SUBJECTIVE AND OBJECTIVE BOX
[facility] Division of Kidney Diseases & Hypertension  FOLLOW UP NOTE  [phone]--------------------------------------------------------------------------------    HPI: 73 y/o M with CAD s/p 12-14 stents placed in 1990's, and BABAR x 5 placed in Sept 2017, CHF with EF 20%, DM, HTN, HLD, AF on coumadin, CVA , ESRD on HD (MWF) COPD, bipolar disorder, dementia admitted for SOB. Last HD session was on 6/27/18.  Patient seen and examined at bedside; appears comfortable at this time.    PAST HISTORY  --------------------------------------------------------------------------------  No significant changes to PMH, PSH, FHx, SHx, unless otherwise noted    ALLERGIES & MEDICATIONS  --------------------------------------------------------------------------------  Allergies    No Known Allergies    Intolerances      Standing Inpatient Medications  ALBUTerol/ipratropium for Nebulization 3 milliLiter(s) Nebulizer every 6 hours  amiodarone    Tablet 200 milliGRAM(s) Oral daily  ampicillin  IVPB 2 Gram(s) IV Intermittent every 12 hours  artificial  tears Solution 1 Drop(s) Both EYES four times a day  aspirin  chewable 81 milliGRAM(s) Oral daily  atorvastatin 40 milliGRAM(s) Oral at bedtime  busPIRone 5 milliGRAM(s) Oral two times a day  clopidogrel Tablet 75 milliGRAM(s) Oral daily  dextrose 50% Injectable 12.5 Gram(s) IV Push once  dextrose 50% Injectable 25 Gram(s) IV Push once  dextrose 50% Injectable 25 Gram(s) IV Push once  epoetin georgie Injectable 4000 Unit(s) IV Push <User Schedule>  heparin  Infusion.  Unit(s)/Hr IV Continuous <Continuous>  insulin lispro (HumaLOG) corrective regimen sliding scale   SubCutaneous three times a day before meals  insulin lispro (HumaLOG) corrective regimen sliding scale   SubCutaneous at bedtime  metoprolol tartrate 25 milliGRAM(s) Oral two times a day  multivitamin 1 Tablet(s) Oral daily  pantoprazole    Tablet 40 milliGRAM(s) Oral before breakfast  tamsulosin 0.4 milliGRAM(s) Oral at bedtime  warfarin 3 milliGRAM(s) Oral once    PRN Inpatient Medications  acetaminophen  Suppository 650 milliGRAM(s) Rectal every 6 hours PRN  dextrose 40% Gel 15 Gram(s) Oral once PRN  glucagon  Injectable 1 milliGRAM(s) IntraMuscular once PRN  heparin  Injectable 5000 Unit(s) IV Push every 6 hours PRN  heparin  Injectable 2500 Unit(s) IV Push every 6 hours PRN      REVIEW OF SYSTEMS  --------------------------------------------------------------------------------    Unable to obtain.    VITALS/PHYSICAL EXAM  --------------------------------------------------------------------------------  T(C): 36.9 (06-29-18 @ 13:22), Max: 36.9 (06-29-18 @ 13:22)  HR: 81 (06-29-18 @ 13:22) (61 - 94)  BP: 114/52 (06-29-18 @ 13:22) (100/43 - 132/68)  RR: 18 (06-29-18 @ 13:22) (18 - 26)  SpO2: 100% (06-29-18 @ 13:22) (89% - 100%)  Wt(kg): --        06-28-18 @ 07:01  -  06-29-18 @ 07:00  --------------------------------------------------------  IN: 568 mL / OUT: 0 mL / NET: 568 mL    06-29-18 @ 07:01  -  06-29-18 @ 13:58  --------------------------------------------------------  IN: 500 mL / OUT: 1900 mL / NET: -1400 mL      Physical Exam:  	  Gen: Elderly male, NAD  	HEENT: anicteric  	Pulm: clear on auscultation.   	CV:  S1S2 rrr  	Abd: +BS, soft   	Ext: No B/L Lower ext edema  	Neuro: minimally verbal, follows commands  	Skin: Warm, without rashes  	Vascular access: Right UE AVF present; thrill and bruit heard.     LABS/STUDIES  --------------------------------------------------------------------------------              8.2    8.71  >-----------<  248      [06-29-18 @ 03:38]              26.9     135  |  90  |  27  ----------------------------<  177      [06-29-18 @ 03:38]  4.1   |  21  |  4.85        Ca     9.4     [06-29-18 @ 03:38]      Mg     2.2     [06-29-18 @ 03:38]      PT/INR: PT 16.0 , INR 1.38       [06-29-18 @ 03:38]  PTT: 80.7       [06-29-18 @ 11:45]      Creatinine Trend:  SCr 4.85 [06-29 @ 03:38]  SCr 3.48 [06-28 @ 06:15]  SCr 4.59 [06-27 @ 06:47]  SCr 2.47 [06-26 @ 02:58]  SCr 3.84 [06-25 @ 03:00] [facility] Division of Kidney Diseases & Hypertension  FOLLOW UP NOTE  [phone]--------------------------------------------------------------------------------    HPI: 73 y/o M with CAD s/p 12-14 stents placed in 1990's, and BABAR x 5 placed in Sept 2017, CHF with EF 20%, DM, HTN, HLD, AF on coumadin, CVA , ESRD on HD (MWF) COPD, bipolar disorder, dementia admitted for SOB.  Last HD session was on 6/27/18.  Patient seen and examined at bedside; appears comfortable at this time.    PAST HISTORY  --------------------------------------------------------------------------------  No significant changes to PMH, PSH, FHx, SHx, unless otherwise noted    ALLERGIES & MEDICATIONS  --------------------------------------------------------------------------------  Allergies    No Known Allergies    Intolerances    Standing Inpatient Medications  ALBUTerol/ipratropium for Nebulization 3 milliLiter(s) Nebulizer every 6 hours  amiodarone    Tablet 200 milliGRAM(s) Oral daily  ampicillin  IVPB 2 Gram(s) IV Intermittent every 12 hours  artificial  tears Solution 1 Drop(s) Both EYES four times a day  aspirin  chewable 81 milliGRAM(s) Oral daily  atorvastatin 40 milliGRAM(s) Oral at bedtime  busPIRone 5 milliGRAM(s) Oral two times a day  clopidogrel Tablet 75 milliGRAM(s) Oral daily  dextrose 50% Injectable 12.5 Gram(s) IV Push once  dextrose 50% Injectable 25 Gram(s) IV Push once  dextrose 50% Injectable 25 Gram(s) IV Push once  epoetin georgie Injectable 4000 Unit(s) IV Push <User Schedule>  heparin  Infusion.  Unit(s)/Hr IV Continuous <Continuous>  insulin lispro (HumaLOG) corrective regimen sliding scale   SubCutaneous three times a day before meals  insulin lispro (HumaLOG) corrective regimen sliding scale   SubCutaneous at bedtime  metoprolol tartrate 25 milliGRAM(s) Oral two times a day  multivitamin 1 Tablet(s) Oral daily  pantoprazole    Tablet 40 milliGRAM(s) Oral before breakfast  tamsulosin 0.4 milliGRAM(s) Oral at bedtime  warfarin 3 milliGRAM(s) Oral once    PRN Inpatient Medications  acetaminophen  Suppository 650 milliGRAM(s) Rectal every 6 hours PRN  dextrose 40% Gel 15 Gram(s) Oral once PRN  glucagon  Injectable 1 milliGRAM(s) IntraMuscular once PRN  heparin  Injectable 5000 Unit(s) IV Push every 6 hours PRN  heparin  Injectable 2500 Unit(s) IV Push every 6 hours PRN      REVIEW OF SYSTEMS  --------------------------------------------------------------------------------    Unable to obtain.    VITALS/PHYSICAL EXAM  --------------------------------------------------------------------------------  T(C): 36.9 (06-29-18 @ 13:22), Max: 36.9 (06-29-18 @ 13:22)  HR: 81 (06-29-18 @ 13:22) (61 - 94)  BP: 114/52 (06-29-18 @ 13:22) (100/43 - 132/68)  RR: 18 (06-29-18 @ 13:22) (18 - 26)  SpO2: 100% (06-29-18 @ 13:22) (89% - 100%)  Wt(kg): --        06-28-18 @ 07:01  -  06-29-18 @ 07:00  --------------------------------------------------------  IN: 568 mL / OUT: 0 mL / NET: 568 mL    06-29-18 @ 07:01  -  06-29-18 @ 13:58  --------------------------------------------------------  IN: 500 mL / OUT: 1900 mL / NET: -1400 mL      Physical Exam:  	  Gen: Elderly male, NAD  	HEENT: anicteric  	Pulm: rales on exam  	CV:  S1S2 rrr  	Abd: +BS, soft   	Ext: No B/L Lower ext edema  	Neuro: minimally verbal, follows commands  	Skin: Warm, without rashes  	Vascular access: Right UE AVF present; thrill and bruit heard.     LABS/STUDIES  --------------------------------------------------------------------------------              8.2    8.71  >-----------<  248      [06-29-18 @ 03:38]              26.9     135  |  90  |  27  ----------------------------<  177      [06-29-18 @ 03:38]  4.1   |  21  |  4.85        Ca     9.4     [06-29-18 @ 03:38]      Mg     2.2     [06-29-18 @ 03:38]      PT/INR: PT 16.0 , INR 1.38       [06-29-18 @ 03:38]  PTT: 80.7       [06-29-18 @ 11:45]      Creatinine Trend:  SCr 4.85 [06-29 @ 03:38]  SCr 3.48 [06-28 @ 06:15]  SCr 4.59 [06-27 @ 06:47]  SCr 2.47 [06-26 @ 02:58]  SCr 3.84 [06-25 @ 03:00]

## 2018-06-29 NOTE — PROGRESS NOTE ADULT - PROBLEM SELECTOR PLAN 1
Patient with h/o ESRD on HD; MWF schedule. Patient had HD session and tolerated it well. BP noted to be stable during HD. AVF working well with good blood flow.  Monitor BMP.

## 2018-06-29 NOTE — DIETITIAN INITIAL EVALUATION ADULT. - OTHER INFO
Pt seen for Length of stay. Pt 73 yo male with ESRD on dialysis. Pt appears alert; Pt speaks [place]. Of note Pt with history of dementia, bipolar disorder, transferred from [place]. No family @ bed side @ time of visit. RDN spoke to Pt's daughter (over the phone: [phone]). Per daughter Pt's appetite not that well since September of 2017, 2/2 his sickness & multiple institutionalizations. Daughter also stated Pt lost weight: ~60# in 9 months. Food preferences discussed; RDN offered PO supplement: Nepro, but daughter declined. Per daughter Nepro gives Pt diarrhea. Pt with chewing difficulty - daughter stated. Of note Pt on Dysphagia 2 Mechanical Soft consistency diet @ present. No swallowing difficulty reported; no report of nausea, vomiting or diarrhea @ this time. Case discussed with Nurse, Nurse Manager; RDN remains available.

## 2018-06-29 NOTE — PROGRESS NOTE ADULT - SUBJECTIVE AND OBJECTIVE BOX
chief complaint : dyspnea        SUBJECTIVE / OVERNIGHT EVENTS: pt appears comfortable, denies chest pain, shortness of breath , nausea, v    MEDICATIONS  (STANDING):  ALBUTerol/ipratropium for Nebulization 3 milliLiter(s) Nebulizer every 6 hours  amiodarone    Tablet 200 milliGRAM(s) Oral daily  ampicillin  IVPB 2 Gram(s) IV Intermittent every 12 hours  artificial  tears Solution 1 Drop(s) Both EYES four times a day  aspirin  chewable 81 milliGRAM(s) Oral daily  atorvastatin 40 milliGRAM(s) Oral at bedtime  busPIRone 5 milliGRAM(s) Oral two times a day  clopidogrel Tablet 75 milliGRAM(s) Oral daily  dextrose 50% Injectable 12.5 Gram(s) IV Push once  dextrose 50% Injectable 25 Gram(s) IV Push once  dextrose 50% Injectable 25 Gram(s) IV Push once  epoetin georgie Injectable 4000 Unit(s) IV Push <User Schedule>  heparin  Infusion.  Unit(s)/Hr (11 mL/Hr) IV Continuous <Continuous>  insulin lispro (HumaLOG) corrective regimen sliding scale   SubCutaneous three times a day before meals  insulin lispro (HumaLOG) corrective regimen sliding scale   SubCutaneous at bedtime  metoprolol tartrate 25 milliGRAM(s) Oral two times a day  multivitamin 1 Tablet(s) Oral daily  pantoprazole    Tablet 40 milliGRAM(s) Oral before breakfast  tamsulosin 0.4 milliGRAM(s) Oral at bedtime    MEDICATIONS  (PRN):  acetaminophen  Suppository 650 milliGRAM(s) Rectal every 6 hours PRN For Temp greater than 38 C (100.4 F)  dextrose 40% Gel 15 Gram(s) Oral once PRN Blood Glucose LESS THAN 70 milliGRAM(s)/deciliter  glucagon  Injectable 1 milliGRAM(s) IntraMuscular once PRN Glucose LESS THAN 70 milligrams/deciliter  heparin  Injectable 5000 Unit(s) IV Push every 6 hours PRN For aPTT less than 40  heparin  Injectable 2500 Unit(s) IV Push every 6 hours PRN For aPTT between 40 - 57    Vital Signs Last 24 Hrs  T(C): 37.3 (29 Jun 2018 19:31), Max: 37.3 (29 Jun 2018 19:31)  T(F): 99.1 (29 Jun 2018 19:31), Max: 99.1 (29 Jun 2018 19:31)  HR: 82 (29 Jun 2018 22:58) (76 - 94)  BP: 112/58 (29 Jun 2018 19:31) (100/43 - 132/68)  BP(mean): --  RR: 18 (29 Jun 2018 19:31) (18 - 26)  SpO2: 98% (29 Jun 2018 22:58) (89% - 100%)    Constitutional: No fever, fatigue  Skin: No rash.  Eyes: No recent vision problems or eye pain.  ENT: No congestion, ear pain, or sore throat.  Cardiovascular: No chest pain or palpation.  Respiratory: No cough, shortness of breath, congestion, or wheezing.  Gastrointestinal: No abdominal pain, nausea, vomiting, or diarrhea.  Genitourinary: No dysuria.  Musculoskeletal: No joint swelling.  Neurologic: No headache.    PHYSICAL EXAM:  GENERAL: NAD  EYES: EOMI, PERRLA  NECK: Supple, No JVD  CHEST/LUNG: dec breath sounds at bases,  HEART:  S1 , S2 +  ABDOMEN: soft bs+  EXTREMITIES:  trace edema+  NEUROLOGY:alert awake     LABS:  06-29    135  |  90<L>  |  27<H>  ----------------------------<  177<H>  4.1   |  21<L>  |  4.85<H>    Ca    9.4      29 Jun 2018 03:38  Mg     2.2     06-29      Creatinine Trend: 4.85 <--, 3.48 <--, 4.59 <--, 2.47 <--, 3.84 <--, 4.44 <--, 6.21 <--                        8.2    8.71  )-----------( 248      ( 29 Jun 2018 03:38 )             26.9     Urine Studies:              PT/INR - ( 29 Jun 2018 03:38 )   PT: 16.0 SEC;   INR: 1.38          PTT - ( 29 Jun 2018 18:05 )  PTT:75.1 SEC

## 2018-06-29 NOTE — DIETITIAN INITIAL EVALUATION ADULT. - DIET TYPE
renal replacement pts:no protein restr,no conc K & phos, low sodium/dysphagia 2, mechanical soft, thin liquids/consistent carbohydrate (evening snack)

## 2018-06-29 NOTE — DIETITIAN INITIAL EVALUATION ADULT. - PERTINENT LABORATORY DATA
(6/29) H/H 8.2/26.9 L, Cl 90 L, BUN 27 H, Creat4.85 H, Glu 177 H;             (6/20) HbA1c 6.6% H, Albumin 3.2 L, phosphorus 2.4 L
Statement Selected

## 2018-06-29 NOTE — PROGRESS NOTE ADULT - SUBJECTIVE AND OBJECTIVE BOX
Reid Martinez MD  Interventional Cardiology / Advance Heart Failure and Cardiac Transplant Specialist  [place] Office : [phone] [place] [address]  Tel:   [place] Office : [phone] [facility] [address] [address]  Tel: [phone]  Cell : [phone]    Subjective : Pt lying in bed comfortable, not in distress, denies any chest pain or SOB  	  MEDICATIONS:  amiodarone    Tablet 200 milliGRAM(s) Oral daily  aspirin  chewable 81 milliGRAM(s) Oral daily  clopidogrel Tablet 75 milliGRAM(s) Oral daily  heparin  Infusion.  Unit(s)/Hr IV Continuous <Continuous>  heparin  Injectable 5000 Unit(s) IV Push every 6 hours PRN  heparin  Injectable 2500 Unit(s) IV Push every 6 hours PRN  metoprolol tartrate 25 milliGRAM(s) Oral two times a day  tamsulosin 0.4 milliGRAM(s) Oral at bedtime    ampicillin  IVPB 2 Gram(s) IV Intermittent every 12 hours    ALBUTerol/ipratropium for Nebulization 3 milliLiter(s) Nebulizer every 6 hours    acetaminophen  Suppository 650 milliGRAM(s) Rectal every 6 hours PRN  busPIRone 5 milliGRAM(s) Oral two times a day    pantoprazole    Tablet 40 milliGRAM(s) Oral before breakfast    atorvastatin 40 milliGRAM(s) Oral at bedtime  dextrose 40% Gel 15 Gram(s) Oral once PRN  dextrose 50% Injectable 12.5 Gram(s) IV Push once  dextrose 50% Injectable 25 Gram(s) IV Push once  dextrose 50% Injectable 25 Gram(s) IV Push once  glucagon  Injectable 1 milliGRAM(s) IntraMuscular once PRN  insulin lispro (HumaLOG) corrective regimen sliding scale   SubCutaneous three times a day before meals  insulin lispro (HumaLOG) corrective regimen sliding scale   SubCutaneous at bedtime    artificial  tears Solution 1 Drop(s) Both EYES four times a day  epoetin georgie Injectable 4000 Unit(s) IV Push <User Schedule>  multivitamin 1 Tablet(s) Oral daily      PHYSICAL EXAM:  T(C): 37.3 (06-29-18 @ 19:31), Max: 37.3 (06-29-18 @ 19:31)  HR: 78 (06-29-18 @ 19:48) (61 - 94)  BP: 112/58 (06-29-18 @ 19:31) (100/43 - 132/68)  RR: 18 (06-29-18 @ 19:31) (18 - 26)  SpO2: 98% (06-29-18 @ 19:48) (89% - 100%)  Wt(kg): --  I&O's Summary    28 Jun 2018 07:01  -  29 Jun 2018 07:00  --------------------------------------------------------  IN: 568 mL / OUT: 0 mL / NET: 568 mL    29 Jun 2018 07:01  -  29 Jun 2018 21:33  --------------------------------------------------------  IN: 500 mL / OUT: 1900 mL / NET: -1400 mL          Appearance: Normal	  HEENT:   Normal oral mucosa, PERRL, EOMI	  Cardiovascular: Normal S1 S2, No JVD, No murmurs, No edema  Respiratory: Lungs clear to auscultation	  Gastrointestinal:  Soft, Non-tender, + BS	  Extremities: Normal range of motion, No clubbing, cyanosis or edema                                    8.2    8.71  )-----------( 248      ( 29 Jun 2018 03:38 )             26.9     06-29    135  |  90<L>  |  27<H>  ----------------------------<  177<H>  4.1   |  21<L>  |  4.85<H>    Ca    9.4      29 Jun 2018 03:38  Mg     2.2     06-29      proBNP:   Lipid Profile:   HgA1c:   TSH:

## 2018-06-29 NOTE — DIETITIAN INITIAL EVALUATION ADULT. - SOURCE
Spoke to Pt's daughter (over the phone: [phone]), Nurse, Medical Chart/family/significant other/other (specify)

## 2018-06-29 NOTE — PROGRESS NOTE ADULT - ASSESSMENT
72 year old with esrd and CHF presents with shortness of breath and hypoxia.   HD via AV fistula  Had leukocytosis and fever.- now resolved     enterococcal bacteremia cleared  CT with ? pyelo. likely urinary source  Echo demonstrates no evidence of endocarditis    Suggest:  Continue ampicillin 2 gm iv q 12 for 14 days of abx with ampicillin through 7/4/2018,    Please call ID if needed this weekend

## 2018-06-29 NOTE — DIETITIAN INITIAL EVALUATION ADULT. - PROBLEM SELECTOR PLAN 6
- INR subtherapeutic  - Will d/w cardiology re starting heparin for possible NSTEMI vs continuing coumadin  - continue metoprolol and amiodarone

## 2018-06-29 NOTE — DIETITIAN INITIAL EVALUATION ADULT. - SIGNS/SYMPTOMS
Pt with chewing difficulty - daughter stated; Pt on Dysphagia 2 Mechanical Soft diet As evidenced by weight change: 30% x 9 months, < 75% of Kcal intake

## 2018-06-30 LAB
APTT BLD: 94.2 SEC — HIGH (ref 27.5–37.4)
BASOPHILS # BLD AUTO: 0.03 K/UL — SIGNIFICANT CHANGE UP (ref 0–0.2)
BASOPHILS NFR BLD AUTO: 0.5 % — SIGNIFICANT CHANGE UP (ref 0–2)
BLD GP AB SCN SERPL QL: NEGATIVE — SIGNIFICANT CHANGE UP
BUN SERPL-MCNC: 22 MG/DL — SIGNIFICANT CHANGE UP (ref 7–23)
CALCIUM SERPL-MCNC: 9.5 MG/DL — SIGNIFICANT CHANGE UP (ref 8.4–10.5)
CHLORIDE SERPL-SCNC: 96 MMOL/L — LOW (ref 98–107)
CK MB BLD-MCNC: 4.38 NG/ML — SIGNIFICANT CHANGE UP (ref 1–6.6)
CK MB BLD-MCNC: SIGNIFICANT CHANGE UP (ref 0–2.5)
CK SERPL-CCNC: 82 U/L — SIGNIFICANT CHANGE UP (ref 30–200)
CO2 SERPL-SCNC: 28 MMOL/L — SIGNIFICANT CHANGE UP (ref 22–31)
CREAT SERPL-MCNC: 3.84 MG/DL — HIGH (ref 0.5–1.3)
EOSINOPHIL # BLD AUTO: 0.23 K/UL — SIGNIFICANT CHANGE UP (ref 0–0.5)
EOSINOPHIL NFR BLD AUTO: 3.5 % — SIGNIFICANT CHANGE UP (ref 0–6)
GLUCOSE BLDC GLUCOMTR-MCNC: 134 MG/DL — HIGH (ref 70–99)
GLUCOSE BLDC GLUCOMTR-MCNC: 179 MG/DL — HIGH (ref 70–99)
GLUCOSE BLDC GLUCOMTR-MCNC: 193 MG/DL — HIGH (ref 70–99)
GLUCOSE BLDC GLUCOMTR-MCNC: 213 MG/DL — HIGH (ref 70–99)
GLUCOSE SERPL-MCNC: 130 MG/DL — HIGH (ref 70–99)
HCT VFR BLD CALC: 23 % — LOW (ref 39–50)
HCT VFR BLD CALC: 23 % — LOW (ref 39–50)
HCT VFR BLD CALC: 24 % — LOW (ref 39–50)
HGB BLD-MCNC: 7 G/DL — CRITICAL LOW (ref 13–17)
HGB BLD-MCNC: 7 G/DL — CRITICAL LOW (ref 13–17)
HGB BLD-MCNC: 7.3 G/DL — LOW (ref 13–17)
IMM GRANULOCYTES # BLD AUTO: 0.03 # — SIGNIFICANT CHANGE UP
IMM GRANULOCYTES NFR BLD AUTO: 0.5 % — SIGNIFICANT CHANGE UP (ref 0–1.5)
INR BLD: 1.59 — HIGH (ref 0.88–1.17)
LYMPHOCYTES # BLD AUTO: 2.06 K/UL — SIGNIFICANT CHANGE UP (ref 1–3.3)
LYMPHOCYTES # BLD AUTO: 31.7 % — SIGNIFICANT CHANGE UP (ref 13–44)
MAGNESIUM SERPL-MCNC: 2.2 MG/DL — SIGNIFICANT CHANGE UP (ref 1.6–2.6)
MCHC RBC-ENTMCNC: 28.1 PG — SIGNIFICANT CHANGE UP (ref 27–34)
MCHC RBC-ENTMCNC: 28.1 PG — SIGNIFICANT CHANGE UP (ref 27–34)
MCHC RBC-ENTMCNC: 28.2 PG — SIGNIFICANT CHANGE UP (ref 27–34)
MCHC RBC-ENTMCNC: 30.4 % — LOW (ref 32–36)
MCV RBC AUTO: 92.4 FL — SIGNIFICANT CHANGE UP (ref 80–100)
MCV RBC AUTO: 92.4 FL — SIGNIFICANT CHANGE UP (ref 80–100)
MCV RBC AUTO: 92.7 FL — SIGNIFICANT CHANGE UP (ref 80–100)
MONOCYTES # BLD AUTO: 0.55 K/UL — SIGNIFICANT CHANGE UP (ref 0–0.9)
MONOCYTES NFR BLD AUTO: 8.5 % — SIGNIFICANT CHANGE UP (ref 2–14)
NEUTROPHILS # BLD AUTO: 3.6 K/UL — SIGNIFICANT CHANGE UP (ref 1.8–7.4)
NEUTROPHILS NFR BLD AUTO: 55.3 % — SIGNIFICANT CHANGE UP (ref 43–77)
NRBC # FLD: 0 — SIGNIFICANT CHANGE UP
PLATELET # BLD AUTO: 242 K/UL — SIGNIFICANT CHANGE UP (ref 150–400)
PLATELET # BLD AUTO: 242 K/UL — SIGNIFICANT CHANGE UP (ref 150–400)
PLATELET # BLD AUTO: 249 K/UL — SIGNIFICANT CHANGE UP (ref 150–400)
PMV BLD: 10 FL — SIGNIFICANT CHANGE UP (ref 7–13)
POTASSIUM SERPL-MCNC: 3.5 MMOL/L — SIGNIFICANT CHANGE UP (ref 3.5–5.3)
POTASSIUM SERPL-SCNC: 3.5 MMOL/L — SIGNIFICANT CHANGE UP (ref 3.5–5.3)
PROTHROM AB SERPL-ACNC: 17.8 SEC — HIGH (ref 9.8–13.1)
RBC # BLD: 2.49 M/UL — LOW (ref 4.2–5.8)
RBC # BLD: 2.49 M/UL — LOW (ref 4.2–5.8)
RBC # BLD: 2.59 M/UL — LOW (ref 4.2–5.8)
RBC # FLD: 15.7 % — HIGH (ref 10.3–14.5)
RH IG SCN BLD-IMP: NEGATIVE — SIGNIFICANT CHANGE UP
SODIUM SERPL-SCNC: 141 MMOL/L — SIGNIFICANT CHANGE UP (ref 135–145)
TROPONIN T, HIGH SENSITIVITY: 566 NG/L — CRITICAL HIGH (ref ?–14)
TROPONIN T, HIGH SENSITIVITY: 572 NG/L — CRITICAL HIGH (ref ?–14)
WBC # BLD: 6.5 K/UL — SIGNIFICANT CHANGE UP (ref 3.8–10.5)
WBC # BLD: 6.5 K/UL — SIGNIFICANT CHANGE UP (ref 3.8–10.5)
WBC # BLD: 6.84 K/UL — SIGNIFICANT CHANGE UP (ref 3.8–10.5)
WBC # FLD AUTO: 6.5 K/UL — SIGNIFICANT CHANGE UP (ref 3.8–10.5)
WBC # FLD AUTO: 6.5 K/UL — SIGNIFICANT CHANGE UP (ref 3.8–10.5)
WBC # FLD AUTO: 6.84 K/UL — SIGNIFICANT CHANGE UP (ref 3.8–10.5)

## 2018-06-30 PROCEDURE — 93010 ELECTROCARDIOGRAM REPORT: CPT

## 2018-06-30 RX ORDER — HALOPERIDOL DECANOATE 100 MG/ML
0.5 INJECTION INTRAMUSCULAR ONCE
Qty: 0 | Refills: 0 | Status: COMPLETED | OUTPATIENT
Start: 2018-06-30 | End: 2018-06-30

## 2018-06-30 RX ORDER — WARFARIN SODIUM 2.5 MG/1
5 TABLET ORAL ONCE
Qty: 0 | Refills: 0 | Status: DISCONTINUED | OUTPATIENT
Start: 2018-06-30 | End: 2018-06-30

## 2018-06-30 RX ADMIN — Medication 5 MILLIGRAM(S): at 18:07

## 2018-06-30 RX ADMIN — Medication 3 MILLILITER(S): at 22:44

## 2018-06-30 RX ADMIN — Medication 1 DROP(S): at 18:12

## 2018-06-30 RX ADMIN — Medication 1: at 13:33

## 2018-06-30 RX ADMIN — Medication 3 MILLILITER(S): at 11:13

## 2018-06-30 RX ADMIN — Medication 1 TABLET(S): at 13:32

## 2018-06-30 RX ADMIN — Medication 3 MILLILITER(S): at 17:15

## 2018-06-30 RX ADMIN — CLOPIDOGREL BISULFATE 75 MILLIGRAM(S): 75 TABLET, FILM COATED ORAL at 13:32

## 2018-06-30 RX ADMIN — Medication 25 MILLIGRAM(S): at 05:27

## 2018-06-30 RX ADMIN — HEPARIN SODIUM 1100 UNIT(S)/HR: 5000 INJECTION INTRAVENOUS; SUBCUTANEOUS at 07:28

## 2018-06-30 RX ADMIN — PANTOPRAZOLE SODIUM 40 MILLIGRAM(S): 20 TABLET, DELAYED RELEASE ORAL at 05:27

## 2018-06-30 RX ADMIN — Medication 81 MILLIGRAM(S): at 13:32

## 2018-06-30 RX ADMIN — Medication 3 MILLILITER(S): at 03:56

## 2018-06-30 RX ADMIN — Medication 1 DROP(S): at 13:35

## 2018-06-30 RX ADMIN — Medication 25 MILLIGRAM(S): at 18:07

## 2018-06-30 RX ADMIN — ATORVASTATIN CALCIUM 40 MILLIGRAM(S): 80 TABLET, FILM COATED ORAL at 21:48

## 2018-06-30 RX ADMIN — Medication 5 MILLIGRAM(S): at 05:27

## 2018-06-30 RX ADMIN — Medication 1: at 18:06

## 2018-06-30 RX ADMIN — AMIODARONE HYDROCHLORIDE 200 MILLIGRAM(S): 400 TABLET ORAL at 05:27

## 2018-06-30 RX ADMIN — Medication 216 GRAM(S): at 18:06

## 2018-06-30 RX ADMIN — Medication 1 DROP(S): at 05:27

## 2018-06-30 RX ADMIN — TAMSULOSIN HYDROCHLORIDE 0.4 MILLIGRAM(S): 0.4 CAPSULE ORAL at 21:48

## 2018-06-30 RX ADMIN — HALOPERIDOL DECANOATE 0.5 MILLIGRAM(S): 100 INJECTION INTRAMUSCULAR at 00:52

## 2018-06-30 RX ADMIN — Medication 216 GRAM(S): at 05:27

## 2018-06-30 NOTE — SWALLOW BEDSIDE ASSESSMENT ADULT - PHARYNGEAL PHASE
Delayed pharyngeal swallow/Decreased laryngeal elevation/Inconsistent throat clear post swallow Delayed pharyngeal swallow/Decreased laryngeal elevation Decreased laryngeal elevation/Delayed pharyngeal swallow

## 2018-06-30 NOTE — PROGRESS NOTE ADULT - SUBJECTIVE AND OBJECTIVE BOX
chief complaint : dyspnea        SUBJECTIVE / OVERNIGHT EVENTS: pt appears comfortable, denies chest pain, shortness of breath , nausea, vomiting, as per nursing staff pt was bleeding at hd access site earlier stopped at present     MEDICATIONS  (STANDING):  ALBUTerol/ipratropium for Nebulization 3 milliLiter(s) Nebulizer every 6 hours  amiodarone    Tablet 200 milliGRAM(s) Oral daily  ampicillin  IVPB 2 Gram(s) IV Intermittent every 12 hours  artificial  tears Solution 1 Drop(s) Both EYES four times a day  aspirin  chewable 81 milliGRAM(s) Oral daily  atorvastatin 40 milliGRAM(s) Oral at bedtime  busPIRone 5 milliGRAM(s) Oral two times a day  clopidogrel Tablet 75 milliGRAM(s) Oral daily  dextrose 50% Injectable 12.5 Gram(s) IV Push once  dextrose 50% Injectable 25 Gram(s) IV Push once  dextrose 50% Injectable 25 Gram(s) IV Push once  epoetin georgie Injectable 4000 Unit(s) IV Push <User Schedule>  insulin lispro (HumaLOG) corrective regimen sliding scale   SubCutaneous three times a day before meals  insulin lispro (HumaLOG) corrective regimen sliding scale   SubCutaneous at bedtime  metoprolol tartrate 25 milliGRAM(s) Oral two times a day  multivitamin 1 Tablet(s) Oral daily  pantoprazole    Tablet 40 milliGRAM(s) Oral before breakfast  tamsulosin 0.4 milliGRAM(s) Oral at bedtime    MEDICATIONS  (PRN):  acetaminophen  Suppository 650 milliGRAM(s) Rectal every 6 hours PRN For Temp greater than 38 C (100.4 F)  dextrose 40% Gel 15 Gram(s) Oral once PRN Blood Glucose LESS THAN 70 milliGRAM(s)/deciliter  glucagon  Injectable 1 milliGRAM(s) IntraMuscular once PRN Glucose LESS THAN 70 milligrams/deciliter    Vital Signs Last 24 Hrs  T(C): 36.8 (30 Jun 2018 15:06), Max: 37.3 (29 Jun 2018 19:31)  T(F): 98.3 (30 Jun 2018 15:06), Max: 99.1 (29 Jun 2018 19:31)  HR: 76 (30 Jun 2018 17:15) (75 - 85)  BP: 128/64 (30 Jun 2018 15:06) (101/58 - 128/64)  BP(mean): --  RR: 18 (30 Jun 2018 15:06) (18 - 18)  SpO2: 100% (30 Jun 2018 15:06) (98% - 100%)    Constitutional: No fever, fatigue  Skin: No rash.  Eyes: No recent vision problems or eye pain.  ENT: No congestion, ear pain, or sore throat.  Cardiovascular: No chest pain or palpation.  Respiratory: No cough, shortness of breath, congestion, or wheezing.  Gastrointestinal: No abdominal pain, nausea, vomiting, or diarrhea.  Genitourinary: No dysuria.  Musculoskeletal: No joint swelling.  Neurologic: No headache.    PHYSICAL EXAM:  GENERAL: NAD  EYES: EOMI, PERRLA  NECK: Supple, No JVD  CHEST/LUNG: dec breath sounds at bases,  HEART:  S1 , S2 +  ABDOMEN: soft bs+  EXTREMITIES:  trace edema+  NEUROLOGY:alert awake     LABS:  06-30    141  |  96<L>  |  22  ----------------------------<  130<H>  3.5   |  28  |  3.84<H>    Ca    9.5      30 Jun 2018 06:10  Mg     2.2     06-30      Creatinine Trend: 3.84 <--, 4.85 <--, 3.48 <--, 4.59 <--, 2.47 <--, 3.84 <--, 4.44 <--                        7.3    6.84  )-----------( 249      ( 30 Jun 2018 08:20 )             24.0     Urine Studies:      CARDIAC MARKERS ( 30 Jun 2018 15:00 )  x     / x     / 82 u/L / 4.38 ng/mL / x              PT/INR - ( 30 Jun 2018 06:10 )   PT: 17.8 SEC;   INR: 1.59          PTT - ( 30 Jun 2018 06:10 )  PTT:94.2 SEC

## 2018-06-30 NOTE — SWALLOW BEDSIDE ASSESSMENT ADULT - SWALLOW EVAL: RECOMMENDED DIET
1) Dysphagia 1 (Puree) with Honey-Thick Liquids; 2) Cinesophagram to objectively assess the swallow mechanism and r/o aspiration for possible diet advancement

## 2018-06-30 NOTE — CHART NOTE - NSCHARTNOTEFT_GEN_A_CORE
Pt with substernal chest pressure and dyspnea. VSS. HR 85 /64 sat 100 on NC. S1S2 no murmur no gallops. Lung noted bibasilar crackle. abdomen + BS. soft. Pt reported chest pressure with breathing. EKG done at bedside SR. V5-V6 ST depression which has remain same as before. Trop sent noted elevated 566 with normal CKMB and CK. Lab and EKG reviewed with cardiology recommended to cycle trop. Pt already on asa and palvix    pt on hep to coumadin for afib, now in SR. Hgb low with episode of bleeding from the av fistula today. Per Attending transfuse 1 unit with HD Monday. Cardiology and GI recommended to discontinue Hep gtt and coumadin given risk of bleeding. Per cards if trop trending up will restart hep gtt for acs. Hep Gtt and coumadin dc per cards. Attending aware Pt with substernal chest pressure and dyspnea. VSS. HR 85 /64 sat 100 on NC. S1S2 no murmur no gallops. Lung noted bibasilar crackle. abdomen + BS. soft. Pt reported chest pressure with breathing. EKG done at bedside SR. V5-V6 ST depression which has remain same as before. Trop sent noted elevated 566 with normal CKMB and CK. Lab and EKG reviewed with cardiology recommended to cycle trop. Pt already on asa and palvix    pt on hep to coumadin for afib, now in SR. Hgb low with episode of bleeding from the av fistula today. Per Attending transfuse 1 unit with HD Monday. Cardiology and GI recommended to discontinue Hep gtt and coumadin given risk of bleeding. Per cards if trop trending up will restart hep gtt for acs. Hep Gtt and coumadin dc per cards. Attending aware    Addendum   repeat trop 572. discussed with cards attending stable. will monitor Pt with substernal chest pressure and dyspnea. VSS. HR 85 /64 sat 100 on NC. S1S2 no murmur no gallops. Lung noted bibasilar crackle. abdomen + BS. soft. Pt reported chest pressure with breathing. EKG done at bedside SR. V5-V6 ST depression which has remain same as before. Trop sent noted elevated 566 with normal CKMB and CK. Lab and EKG reviewed with cardiology recommended to cycle trop. Pt already on asa and palvix    pt on hep to coumadin for afib, now in SR. Hgb low with episode of bleeding from the av fistula today. Per Attending transfuse 1 unit with HD Monday. Cardiology and GI recommended to discontinue Hep gtt and coumadin given risk of bleeding. Per cards if trop trending up will restart hep gtt for acs. Hep Gtt and coumadin dc per cards. Attending aware    Addendum   repeat trop 572. Lab value discussed with cards attending stable. will monitor no need for hep gtt for now

## 2018-06-30 NOTE — SWALLOW BEDSIDE ASSESSMENT ADULT - COMMENTS
As per charting, patient is a 72 year old male with a PMHx of CAD S/P stent placement x 12-14 stents placed in 1990's and BABAR x 5 placed in Sept 2017, ischemic cardiomyopathy with severe LV dysfunction, atrial fibrillation on Coumadin, ESRD on HD M/W/F with chronic james, CVA with right facial droop, COPD, HTN, HLD, DM, bipolar disorder and dementia was transferred from [facility] for respiratory distress. In the ED, pt was noted to have pulmonary edema on CXR S/P Lasix 80mg with improvement. Last CXR 6/24/18 shows "No significant interval change in findings likely due to interstitial and alveolar pulmonary edema. Superimposed pneumonia, particularly in the right lower lung is not excluded."    Patient was received in drowsy state though arousable to verbal/tactile stimuli this AM. Respiratory pattern appeared adequate on room air. Recommendations discussed with RN and PA (pager #[phone]).    Of note, patient was seen for a modified barium swallow study on 9/29/17, during a prior hospitalization at [facility]. Recommendations were made for a dysphagia 1 diet with honey-thick liquids via tspn given findings of silent laryngeal penetration (see chart for full report).

## 2018-06-30 NOTE — PROGRESS NOTE ADULT - SUBJECTIVE AND OBJECTIVE BOX
Reid Martinez MD  Interventional Cardiology / Advance Heart Failure and Cardiac Transplant Specialist  [place] Office : [address] [place] [address]  Tel:   [place] Office : [address] [facility] [place] [address]  Tel: [phone]  Cell : [phone]    Subjective : Pt lying in bed comfortable, not in distress, denies any SOB some chest pain  	  MEDICATIONS:  amiodarone    Tablet 200 milliGRAM(s) Oral daily  aspirin  chewable 81 milliGRAM(s) Oral daily  clopidogrel Tablet 75 milliGRAM(s) Oral daily  metoprolol tartrate 25 milliGRAM(s) Oral two times a day  tamsulosin 0.4 milliGRAM(s) Oral at bedtime    ampicillin  IVPB 2 Gram(s) IV Intermittent every 12 hours    ALBUTerol/ipratropium for Nebulization 3 milliLiter(s) Nebulizer every 6 hours    acetaminophen  Suppository 650 milliGRAM(s) Rectal every 6 hours PRN  busPIRone 5 milliGRAM(s) Oral two times a day    pantoprazole    Tablet 40 milliGRAM(s) Oral before breakfast    atorvastatin 40 milliGRAM(s) Oral at bedtime  dextrose 40% Gel 15 Gram(s) Oral once PRN  dextrose 50% Injectable 12.5 Gram(s) IV Push once  dextrose 50% Injectable 25 Gram(s) IV Push once  dextrose 50% Injectable 25 Gram(s) IV Push once  glucagon  Injectable 1 milliGRAM(s) IntraMuscular once PRN  insulin lispro (HumaLOG) corrective regimen sliding scale   SubCutaneous three times a day before meals  insulin lispro (HumaLOG) corrective regimen sliding scale   SubCutaneous at bedtime    artificial  tears Solution 1 Drop(s) Both EYES four times a day  epoetin georgie Injectable 4000 Unit(s) IV Push <User Schedule>  multivitamin 1 Tablet(s) Oral daily      PHYSICAL EXAM:  T(C): 36.8 (06-30-18 @ 15:06), Max: 37.3 (06-29-18 @ 19:31)  HR: 76 (06-30-18 @ 17:15) (75 - 85)  BP: 128/64 (06-30-18 @ 15:06) (101/58 - 128/64)  RR: 18 (06-30-18 @ 15:06) (18 - 18)  SpO2: 100% (06-30-18 @ 15:06) (98% - 100%)  Wt(kg): --  I&O's Summary    29 Jun 2018 07:01  -  30 Jun 2018 07:00  --------------------------------------------------------  IN: 500 mL / OUT: 1900 mL / NET: -1400 mL    30 Jun 2018 07:01  -  30 Jun 2018 17:42  --------------------------------------------------------  IN: 220 mL / OUT: 0 mL / NET: 220 mL          Appearance: Normal	  HEENT:   Normal oral mucosa, PERRL, EOMI	  Cardiovascular: Normal S1 S2, No JVD, No murmurs, No edema  Respiratory: Lungs clear to auscultation	  Gastrointestinal:  Soft, Non-tender, + BS	  Extremities: Normal range of motion, No clubbing, cyanosis or edema        CKMB: 4.38 ng/mL (06-30 @ 15:00)    CKMB Relative Index: Test not performed (06-30 @ 15:00)                            7.3    6.84  )-----------( 249      ( 30 Jun 2018 08:20 )             24.0     06-30    141  |  96<L>  |  22  ----------------------------<  130<H>  3.5   |  28  |  3.84<H>    Ca    9.5      30 Jun 2018 06:10  Mg     2.2     06-30      proBNP:   Lipid Profile:   HgA1c:   TSH:

## 2018-06-30 NOTE — SWALLOW BEDSIDE ASSESSMENT ADULT - ASR SWALLOW ASPIRATION MONITOR
cough/gurgly voice/change of breathing pattern/position upright (90Y)/fever/throat clearing/oral hygiene/pneumonia/upper respiratory infection

## 2018-06-30 NOTE — SWALLOW BEDSIDE ASSESSMENT ADULT - SWALLOW EVAL: DIAGNOSIS
Patient demonstrates oropharyngeal dysphagia characterized by slow mastication and delayed bolus collection, manipulation and transfer of mechanical soft textures, exacerbated by edentulous status. Functional oral management noted for puree, honey-thick, nectar-thick and thin liquids. The pharyngeal stage is characterized by a suspected delay in swallow trigger with reduced hyolaryngeal excursion upon digital palpation. Inconsistent, delayed throat clear noted after the swallow with nectar-thick and thin liquids, suggesting laryngeal penetration vs aspiration. No overt clinical s/s noted with puree, mech soft and honey-thick liquids.

## 2018-06-30 NOTE — SWALLOW BEDSIDE ASSESSMENT ADULT - SWALLOW EVAL: RECOMMENDED FEEDING/EATING TECHNIQUES
oral hygiene/maintain upright posture during/after eating for 30 mins/no straws/allow for swallow between intakes/crush medication (when feasible)/position upright (90 degrees)/small sips/bites

## 2018-06-30 NOTE — PROGRESS NOTE ADULT - SUBJECTIVE AND OBJECTIVE BOX
JIMI BUCHANAN:[id number],   72yMale followed for:  No Known Allergies    PAST MEDICAL & SURGICAL HISTORY:  CVA (cerebral vascular accident)  COPD (chronic obstructive pulmonary disease)  BPH (benign prostatic hyperplasia)  Bipolar affective disorder  CKD (chronic kidney disease)  Pancreatitis  Hypertension  Dyslipidemia  Diabetes mellitus  Coronary artery disease  HLD (hyperlipidemia)  Anemia  Acute renal failure  CKD (chronic kidney disease)  COPD (chronic obstructive pulmonary disease)  Fainting  Cardiac arrhythmia  Dizziness  Hydronephrosis  Cholecystitis  Bipolar 1 disorder  DM (diabetes mellitus)  HTN (hypertension)  Lumbar radiculopathy  Left heart failure  Reflux esophagitis  Coronary atherosclerosis  History of cardiac catheterization  No significant past surgical history    FAMILY HISTORY:  No pertinent family history in first degree relatives    MEDICATIONS  (STANDING):  ALBUTerol/ipratropium for Nebulization 3 milliLiter(s) Nebulizer every 6 hours  amiodarone    Tablet 200 milliGRAM(s) Oral daily  ampicillin  IVPB 2 Gram(s) IV Intermittent every 12 hours  artificial  tears Solution 1 Drop(s) Both EYES four times a day  aspirin  chewable 81 milliGRAM(s) Oral daily  atorvastatin 40 milliGRAM(s) Oral at bedtime  busPIRone 5 milliGRAM(s) Oral two times a day  clopidogrel Tablet 75 milliGRAM(s) Oral daily  dextrose 50% Injectable 12.5 Gram(s) IV Push once  dextrose 50% Injectable 25 Gram(s) IV Push once  dextrose 50% Injectable 25 Gram(s) IV Push once  epoetin georgie Injectable 4000 Unit(s) IV Push <User Schedule>  heparin  Infusion.  Unit(s)/Hr (11 mL/Hr) IV Continuous <Continuous>  insulin lispro (HumaLOG) corrective regimen sliding scale   SubCutaneous three times a day before meals  insulin lispro (HumaLOG) corrective regimen sliding scale   SubCutaneous at bedtime  metoprolol tartrate 25 milliGRAM(s) Oral two times a day  multivitamin 1 Tablet(s) Oral daily  pantoprazole    Tablet 40 milliGRAM(s) Oral before breakfast  tamsulosin 0.4 milliGRAM(s) Oral at bedtime    MEDICATIONS  (PRN):  acetaminophen  Suppository 650 milliGRAM(s) Rectal every 6 hours PRN For Temp greater than 38 C (100.4 F)  dextrose 40% Gel 15 Gram(s) Oral once PRN Blood Glucose LESS THAN 70 milliGRAM(s)/deciliter  glucagon  Injectable 1 milliGRAM(s) IntraMuscular once PRN Glucose LESS THAN 70 milligrams/deciliter  heparin  Injectable 5000 Unit(s) IV Push every 6 hours PRN For aPTT less than 40  heparin  Injectable 2500 Unit(s) IV Push every 6 hours PRN For aPTT between 40 - 57      Vital Signs Last 24 Hrs  T(C): 36.9 (30 Jun 2018 05:23), Max: 37.3 (29 Jun 2018 19:31)  T(F): 98.4 (30 Jun 2018 05:23), Max: 99.1 (29 Jun 2018 19:31)  HR: 75 (30 Jun 2018 05:23) (75 - 86)  BP: 101/58 (30 Jun 2018 05:23) (100/43 - 114/52)  BP(mean): --  RR: 18 (30 Jun 2018 05:23) (18 - 18)  SpO2: 98% (30 Jun 2018 05:23) (98% - 100%)  nc/at  s1s2  cta  soft, nt, nd no guarding or rebound  no c/c/e    CBC Full  -  ( 30 Jun 2018 06:10 )  WBC Count : 6.50 K/uL  Hemoglobin : 7.0 g/dL  Hematocrit : 23.0 %  Platelet Count - Automated : 242 K/uL  Mean Cell Volume : 92.4 fL  Mean Cell Hemoglobin : 28.1 pg  Mean Cell Hemoglobin Concentration : 30.4 %  Auto Neutrophil # : 3.60 K/uL  Auto Lymphocyte # : 2.06 K/uL  Auto Monocyte # : 0.55 K/uL  Auto Eosinophil # : 0.23 K/uL  Auto Basophil # : 0.03 K/uL  Auto Neutrophil % : 55.3 %  Auto Lymphocyte % : 31.7 %  Auto Monocyte % : 8.5 %  Auto Eosinophil % : 3.5 %  Auto Basophil % : 0.5 %    06-30    141  |  96<L>  |  22  ----------------------------<  130<H>  3.5   |  28  |  3.84<H>    Ca    9.5      30 Jun 2018 06:10  Mg     2.2     06-30      PT/INR - ( 30 Jun 2018 06:10 )   PT: 17.8 SEC;   INR: 1.59          PTT - ( 30 Jun 2018 06:10 )  PTT:94.2 SEC

## 2018-06-30 NOTE — PROGRESS NOTE ADULT - ASSESSMENT
Echo - 9/17 - EF severely decreased  CXR w/ pulm edema    a/p     1) Acute on chronic systolic CHF - clinically stable, continue volume removal with HD, repeat echo with some improvement in LV function  mild troponin elevation likely sec to ESRD and CHF. cont with BB . no acei or arb due to borderline low bp  2) CAD s/p PCI - on ASA and plavix , had chest pain EKG shows no changes st depression in lateral leads which are old  3) ESRD on HD  4) bacteremia -iv abx, repeat bcx negative, echo with no obvious source  5) Afib - d/c IV heparin and coumadin as hemoglobin in low risk > benefit, discussed with GI

## 2018-07-01 LAB
APTT BLD: 34.9 SEC — SIGNIFICANT CHANGE UP (ref 27.5–37.4)
BASOPHILS # BLD AUTO: 0.03 K/UL — SIGNIFICANT CHANGE UP (ref 0–0.2)
BASOPHILS NFR BLD AUTO: 0.3 % — SIGNIFICANT CHANGE UP (ref 0–2)
BUN SERPL-MCNC: 34 MG/DL — HIGH (ref 7–23)
CALCIUM SERPL-MCNC: 9.7 MG/DL — SIGNIFICANT CHANGE UP (ref 8.4–10.5)
CHLORIDE SERPL-SCNC: 91 MMOL/L — LOW (ref 98–107)
CO2 SERPL-SCNC: 24 MMOL/L — SIGNIFICANT CHANGE UP (ref 22–31)
CREAT SERPL-MCNC: 5.24 MG/DL — HIGH (ref 0.5–1.3)
EOSINOPHIL # BLD AUTO: 0.03 K/UL — SIGNIFICANT CHANGE UP (ref 0–0.5)
EOSINOPHIL NFR BLD AUTO: 0.3 % — SIGNIFICANT CHANGE UP (ref 0–6)
GLUCOSE BLDC GLUCOMTR-MCNC: 140 MG/DL — HIGH (ref 70–99)
GLUCOSE BLDC GLUCOMTR-MCNC: 149 MG/DL — HIGH (ref 70–99)
GLUCOSE BLDC GLUCOMTR-MCNC: 240 MG/DL — HIGH (ref 70–99)
GLUCOSE BLDC GLUCOMTR-MCNC: 259 MG/DL — HIGH (ref 70–99)
GLUCOSE SERPL-MCNC: 193 MG/DL — HIGH (ref 70–99)
HCT VFR BLD CALC: 28.1 % — LOW (ref 39–50)
HCT VFR BLD CALC: 28.1 % — LOW (ref 39–50)
HGB BLD-MCNC: 8.1 G/DL — LOW (ref 13–17)
HGB BLD-MCNC: 8.1 G/DL — LOW (ref 13–17)
IMM GRANULOCYTES # BLD AUTO: 0.06 # — SIGNIFICANT CHANGE UP
IMM GRANULOCYTES NFR BLD AUTO: 0.6 % — SIGNIFICANT CHANGE UP (ref 0–1.5)
INR BLD: 1.36 — HIGH (ref 0.88–1.17)
LYMPHOCYTES # BLD AUTO: 0.96 K/UL — LOW (ref 1–3.3)
LYMPHOCYTES # BLD AUTO: 9.3 % — LOW (ref 13–44)
MAGNESIUM SERPL-MCNC: 2.2 MG/DL — SIGNIFICANT CHANGE UP (ref 1.6–2.6)
MCHC RBC-ENTMCNC: 27.9 PG — SIGNIFICANT CHANGE UP (ref 27–34)
MCHC RBC-ENTMCNC: 27.9 PG — SIGNIFICANT CHANGE UP (ref 27–34)
MCHC RBC-ENTMCNC: 28.8 % — LOW (ref 32–36)
MCHC RBC-ENTMCNC: 28.8 % — LOW (ref 32–36)
MCV RBC AUTO: 96.9 FL — SIGNIFICANT CHANGE UP (ref 80–100)
MCV RBC AUTO: 96.9 FL — SIGNIFICANT CHANGE UP (ref 80–100)
MONOCYTES # BLD AUTO: 0.39 K/UL — SIGNIFICANT CHANGE UP (ref 0–0.9)
MONOCYTES NFR BLD AUTO: 3.8 % — SIGNIFICANT CHANGE UP (ref 2–14)
NEUTROPHILS # BLD AUTO: 8.82 K/UL — HIGH (ref 1.8–7.4)
NEUTROPHILS NFR BLD AUTO: 85.7 % — HIGH (ref 43–77)
NRBC # FLD: 0 — SIGNIFICANT CHANGE UP
NRBC # FLD: 0 — SIGNIFICANT CHANGE UP
PLATELET # BLD AUTO: 259 K/UL — SIGNIFICANT CHANGE UP (ref 150–400)
PLATELET # BLD AUTO: 259 K/UL — SIGNIFICANT CHANGE UP (ref 150–400)
PMV BLD: 10.2 FL — SIGNIFICANT CHANGE UP (ref 7–13)
PMV BLD: 10.2 FL — SIGNIFICANT CHANGE UP (ref 7–13)
POTASSIUM SERPL-MCNC: 3.8 MMOL/L — SIGNIFICANT CHANGE UP (ref 3.5–5.3)
POTASSIUM SERPL-SCNC: 3.8 MMOL/L — SIGNIFICANT CHANGE UP (ref 3.5–5.3)
PROTHROM AB SERPL-ACNC: 15.7 SEC — HIGH (ref 9.8–13.1)
RBC # BLD: 2.9 M/UL — LOW (ref 4.2–5.8)
RBC # BLD: 2.9 M/UL — LOW (ref 4.2–5.8)
RBC # FLD: 15.4 % — HIGH (ref 10.3–14.5)
RBC # FLD: 15.4 % — HIGH (ref 10.3–14.5)
SODIUM SERPL-SCNC: 138 MMOL/L — SIGNIFICANT CHANGE UP (ref 135–145)
TROPONIN T, HIGH SENSITIVITY: 446 NG/L — CRITICAL HIGH (ref ?–14)
WBC # BLD: 10.29 K/UL — SIGNIFICANT CHANGE UP (ref 3.8–10.5)
WBC # BLD: 10.29 K/UL — SIGNIFICANT CHANGE UP (ref 3.8–10.5)
WBC # FLD AUTO: 10.29 K/UL — SIGNIFICANT CHANGE UP (ref 3.8–10.5)
WBC # FLD AUTO: 10.29 K/UL — SIGNIFICANT CHANGE UP (ref 3.8–10.5)

## 2018-07-01 PROCEDURE — 93010 ELECTROCARDIOGRAM REPORT: CPT

## 2018-07-01 RX ADMIN — TAMSULOSIN HYDROCHLORIDE 0.4 MILLIGRAM(S): 0.4 CAPSULE ORAL at 21:58

## 2018-07-01 RX ADMIN — Medication 1 TABLET(S): at 12:58

## 2018-07-01 RX ADMIN — Medication 216 GRAM(S): at 06:11

## 2018-07-01 RX ADMIN — AMIODARONE HYDROCHLORIDE 200 MILLIGRAM(S): 400 TABLET ORAL at 06:11

## 2018-07-01 RX ADMIN — Medication 1 DROP(S): at 00:05

## 2018-07-01 RX ADMIN — Medication 25 MILLIGRAM(S): at 17:19

## 2018-07-01 RX ADMIN — Medication 3 MILLILITER(S): at 21:04

## 2018-07-01 RX ADMIN — Medication 1 DROP(S): at 06:14

## 2018-07-01 RX ADMIN — Medication 3 MILLILITER(S): at 09:55

## 2018-07-01 RX ADMIN — Medication 2: at 12:58

## 2018-07-01 RX ADMIN — Medication 81 MILLIGRAM(S): at 12:58

## 2018-07-01 RX ADMIN — ATORVASTATIN CALCIUM 40 MILLIGRAM(S): 80 TABLET, FILM COATED ORAL at 21:58

## 2018-07-01 RX ADMIN — CLOPIDOGREL BISULFATE 75 MILLIGRAM(S): 75 TABLET, FILM COATED ORAL at 12:58

## 2018-07-01 RX ADMIN — Medication 25 MILLIGRAM(S): at 06:11

## 2018-07-01 RX ADMIN — Medication 3 MILLILITER(S): at 15:38

## 2018-07-01 RX ADMIN — PANTOPRAZOLE SODIUM 40 MILLIGRAM(S): 20 TABLET, DELAYED RELEASE ORAL at 06:11

## 2018-07-01 RX ADMIN — Medication 5 MILLIGRAM(S): at 06:11

## 2018-07-01 RX ADMIN — Medication 1: at 21:58

## 2018-07-01 RX ADMIN — Medication 1 DROP(S): at 12:52

## 2018-07-01 RX ADMIN — Medication 1 DROP(S): at 17:19

## 2018-07-01 RX ADMIN — Medication 216 GRAM(S): at 17:19

## 2018-07-01 RX ADMIN — Medication 5 MILLIGRAM(S): at 17:19

## 2018-07-01 NOTE — PROVIDER CONTACT NOTE (OTHER) - ASSESSMENT
Patient short of breath, diaphoretic and using accessory muscles.
VSS. pt placed on 3L NC
belly breathing. Vitals are stable.
Patient alert, ox3 but anxious.  at 1945, 137 at 1946, 121 at 1948, 115 at 1953.

## 2018-07-01 NOTE — PROGRESS NOTE ADULT - SUBJECTIVE AND OBJECTIVE BOX
JIMI BUCHANAN:[id number],   72yMale followed for:  No Known Allergies    PAST MEDICAL & SURGICAL HISTORY:  CVA (cerebral vascular accident)  COPD (chronic obstructive pulmonary disease)  BPH (benign prostatic hyperplasia)  Bipolar affective disorder  CKD (chronic kidney disease)  Pancreatitis  Hypertension  Dyslipidemia  Diabetes mellitus  Coronary artery disease  HLD (hyperlipidemia)  Anemia  Acute renal failure  CKD (chronic kidney disease)  COPD (chronic obstructive pulmonary disease)  Fainting  Cardiac arrhythmia  Dizziness  Hydronephrosis  Cholecystitis  Bipolar 1 disorder  DM (diabetes mellitus)  HTN (hypertension)  Lumbar radiculopathy  Left heart failure  Reflux esophagitis  Coronary atherosclerosis  History of cardiac catheterization  No significant past surgical history    FAMILY HISTORY:  No pertinent family history in first degree relatives    MEDICATIONS  (STANDING):  ALBUTerol/ipratropium for Nebulization 3 milliLiter(s) Nebulizer every 6 hours  amiodarone    Tablet 200 milliGRAM(s) Oral daily  ampicillin  IVPB 2 Gram(s) IV Intermittent every 12 hours  artificial  tears Solution 1 Drop(s) Both EYES four times a day  aspirin  chewable 81 milliGRAM(s) Oral daily  atorvastatin 40 milliGRAM(s) Oral at bedtime  busPIRone 5 milliGRAM(s) Oral two times a day  clopidogrel Tablet 75 milliGRAM(s) Oral daily  dextrose 50% Injectable 12.5 Gram(s) IV Push once  dextrose 50% Injectable 25 Gram(s) IV Push once  dextrose 50% Injectable 25 Gram(s) IV Push once  epoetin georgie Injectable 4000 Unit(s) IV Push <User Schedule>  insulin lispro (HumaLOG) corrective regimen sliding scale   SubCutaneous three times a day before meals  insulin lispro (HumaLOG) corrective regimen sliding scale   SubCutaneous at bedtime  metoprolol tartrate 25 milliGRAM(s) Oral two times a day  multivitamin 1 Tablet(s) Oral daily  pantoprazole    Tablet 40 milliGRAM(s) Oral before breakfast  tamsulosin 0.4 milliGRAM(s) Oral at bedtime    MEDICATIONS  (PRN):  acetaminophen  Suppository 650 milliGRAM(s) Rectal every 6 hours PRN For Temp greater than 38 C (100.4 F)  dextrose 40% Gel 15 Gram(s) Oral once PRN Blood Glucose LESS THAN 70 milliGRAM(s)/deciliter  glucagon  Injectable 1 milliGRAM(s) IntraMuscular once PRN Glucose LESS THAN 70 milligrams/deciliter      Vital Signs Last 24 Hrs  T(C): 36.7 (01 Jul 2018 06:08), Max: 37 (30 Jun 2018 20:10)  T(F): 98.1 (01 Jul 2018 06:08), Max: 98.6 (30 Jun 2018 20:10)  HR: 87 (01 Jul 2018 07:37) (70 - 87)  BP: 118/69 (01 Jul 2018 06:08) (118/69 - 144/73)  BP(mean): --  RR: 18 (01 Jul 2018 06:08) (18 - 18)  SpO2: 97% (01 Jul 2018 07:37) (97% - 100%)  nc/at  s1s2  cta  soft, nt, nd no guarding or rebound  no c/c/e    CBC Full  -  ( 01 Jul 2018 04:58 )  WBC Count : 10.29 K/uL  Hemoglobin : 8.1 g/dL  Hematocrit : 28.1 %  Platelet Count - Automated : 259 K/uL  Mean Cell Volume : 96.9 fL  Mean Cell Hemoglobin : 27.9 pg  Mean Cell Hemoglobin Concentration : 28.8 %  Auto Neutrophil # : 8.82 K/uL  Auto Lymphocyte # : 0.96 K/uL  Auto Monocyte # : 0.39 K/uL  Auto Eosinophil # : 0.03 K/uL  Auto Basophil # : 0.03 K/uL  Auto Neutrophil % : 85.7 %  Auto Lymphocyte % : 9.3 %  Auto Monocyte % : 3.8 %  Auto Eosinophil % : 0.3 %  Auto Basophil % : 0.3 %    07-01    138  |  91<L>  |  34<H>  ----------------------------<  193<H>  3.8   |  24  |  5.24<H>    Ca    9.7      01 Jul 2018 04:58  Mg     2.2     07-01      PT/INR - ( 01 Jul 2018 04:58 )   PT: 15.7 SEC;   INR: 1.36          PTT - ( 01 Jul 2018 04:58 )  PTT:34.9 SEC

## 2018-07-01 NOTE — PROGRESS NOTE ADULT - ASSESSMENT
h/h stable.  risk>> benefit endoswopic eval now. d/w dr. del rio, aspirin/ plavix without warfarin seems appropriate from gi perspective

## 2018-07-01 NOTE — PROGRESS NOTE ADULT - SUBJECTIVE AND OBJECTIVE BOX
chief complaint : dyspnea        SUBJECTIVE / OVERNIGHT EVENTS: pt appears comfortable, denies chest pain, shortness of breath , nausea, vomiting,     MEDICATIONS  (STANDING):  ALBUTerol/ipratropium for Nebulization 3 milliLiter(s) Nebulizer every 6 hours  amiodarone    Tablet 200 milliGRAM(s) Oral daily  ampicillin  IVPB 2 Gram(s) IV Intermittent every 12 hours  artificial  tears Solution 1 Drop(s) Both EYES four times a day  aspirin  chewable 81 milliGRAM(s) Oral daily  atorvastatin 40 milliGRAM(s) Oral at bedtime  busPIRone 5 milliGRAM(s) Oral two times a day  clopidogrel Tablet 75 milliGRAM(s) Oral daily  dextrose 50% Injectable 12.5 Gram(s) IV Push once  dextrose 50% Injectable 25 Gram(s) IV Push once  dextrose 50% Injectable 25 Gram(s) IV Push once  epoetin georgie Injectable 4000 Unit(s) IV Push <User Schedule>  insulin lispro (HumaLOG) corrective regimen sliding scale   SubCutaneous three times a day before meals  insulin lispro (HumaLOG) corrective regimen sliding scale   SubCutaneous at bedtime  metoprolol tartrate 25 milliGRAM(s) Oral two times a day  multivitamin 1 Tablet(s) Oral daily  pantoprazole    Tablet 40 milliGRAM(s) Oral before breakfast  tamsulosin 0.4 milliGRAM(s) Oral at bedtime    MEDICATIONS  (PRN):  acetaminophen  Suppository 650 milliGRAM(s) Rectal every 6 hours PRN For Temp greater than 38 C (100.4 F)  dextrose 40% Gel 15 Gram(s) Oral once PRN Blood Glucose LESS THAN 70 milliGRAM(s)/deciliter  glucagon  Injectable 1 milliGRAM(s) IntraMuscular once PRN Glucose LESS THAN 70 milligrams/deciliter    Vital Signs Last 24 Hrs  T(C): 36.4 (01 Jul 2018 22:04), Max: 36.7 (01 Jul 2018 06:08)  T(F): 97.6 (01 Jul 2018 22:04), Max: 98.1 (01 Jul 2018 06:08)  HR: 82 (01 Jul 2018 22:04) (70 - 87)  BP: 112/64 (01 Jul 2018 22:04) (97/58 - 130/69)  BP(mean): --  RR: 19 (01 Jul 2018 22:04) (17 - 19)  SpO2: 97% (01 Jul 2018 22:04) (96% - 100%)    Constitutional: No fever, fatigue  Skin: No rash.  Eyes: No recent vision problems or eye pain.  ENT: No congestion, ear pain, or sore throat.  Cardiovascular: No chest pain or palpation.  Respiratory: No cough, shortness of breath, congestion, or wheezing.  Gastrointestinal: No abdominal pain, nausea, vomiting, or diarrhea.  Genitourinary: No dysuria.  Musculoskeletal: No joint swelling.  Neurologic: No headache.    PHYSICAL EXAM:  GENERAL: NAD  EYES: EOMI, PERRLA  NECK: Supple, No JVD  CHEST/LUNG: dec breath sounds at bases,  HEART:  S1 , S2 +  ABDOMEN: soft bs+  EXTREMITIES:  trace edema+  NEUROLOGY:alert awake     LABS:  07-01    138  |  91<L>  |  34<H>  ----------------------------<  193<H>  3.8   |  24  |  5.24<H>    Ca    9.7      01 Jul 2018 04:58  Mg     2.2     07-01      Creatinine Trend: 5.24 <--, 3.84 <--, 4.85 <--, 3.48 <--, 4.59 <--, 2.47 <--, 3.84 <--                        8.1    10.29 )-----------( 259      ( 01 Jul 2018 04:58 )             28.1     Urine Studies:      CARDIAC MARKERS ( 30 Jun 2018 15:00 )  x     / x     / 82 u/L / 4.38 ng/mL / x              PT/INR - ( 01 Jul 2018 04:58 )   PT: 15.7 SEC;   INR: 1.36          PTT - ( 01 Jul 2018 04:58 )  PTT:34.9 SEC

## 2018-07-01 NOTE — PROGRESS NOTE ADULT - ASSESSMENT
Echo - 9/17 - EF severely decreased  CXR w/ pulm edema    a/p     1) Acute on chronic systolic CHF - clinically stable, continue volume removal with HD, repeat echo with some improvement in LV function  mild troponin elevation likely sec to ESRD and CHF. cont with BB . no acei or arb due to borderline low bp  2) CAD s/p PCI - on ASA and plavix , had chest pain EKG shows no changes st depression in lateral leads which are old  3) ESRD on HD  4) bacteremia -iv abx, repeat bcx negative, echo with no obvious source  5) Afib - d/c IV heparin and coumadin as hemoglobin in low,  risk > benefit, discussed with GI

## 2018-07-01 NOTE — PROGRESS NOTE ADULT - SUBJECTIVE AND OBJECTIVE BOX
Reid Martinez MD  Interventional Cardiology / Advance Heart Failure and Cardiac Transplant Specialist  [place] Office : [address] [place] [address]  Tel:   [place] Office : [address] [facility] [address] [address]  Tel: [phone]  Cell : [phone]    Subjective : Pt lying in bed comfortable, not in distress, denies any chest pain or SOB  	  MEDICATIONS:  amiodarone    Tablet 200 milliGRAM(s) Oral daily  aspirin  chewable 81 milliGRAM(s) Oral daily  clopidogrel Tablet 75 milliGRAM(s) Oral daily  metoprolol tartrate 25 milliGRAM(s) Oral two times a day  tamsulosin 0.4 milliGRAM(s) Oral at bedtime    ampicillin  IVPB 2 Gram(s) IV Intermittent every 12 hours    ALBUTerol/ipratropium for Nebulization 3 milliLiter(s) Nebulizer every 6 hours    acetaminophen  Suppository 650 milliGRAM(s) Rectal every 6 hours PRN  busPIRone 5 milliGRAM(s) Oral two times a day    pantoprazole    Tablet 40 milliGRAM(s) Oral before breakfast    atorvastatin 40 milliGRAM(s) Oral at bedtime  dextrose 40% Gel 15 Gram(s) Oral once PRN  dextrose 50% Injectable 12.5 Gram(s) IV Push once  dextrose 50% Injectable 25 Gram(s) IV Push once  dextrose 50% Injectable 25 Gram(s) IV Push once  glucagon  Injectable 1 milliGRAM(s) IntraMuscular once PRN  insulin lispro (HumaLOG) corrective regimen sliding scale   SubCutaneous three times a day before meals  insulin lispro (HumaLOG) corrective regimen sliding scale   SubCutaneous at bedtime    artificial  tears Solution 1 Drop(s) Both EYES four times a day  epoetin georgie Injectable 4000 Unit(s) IV Push <User Schedule>  multivitamin 1 Tablet(s) Oral daily      PHYSICAL EXAM:  T(C): 36.6 (07-01-18 @ 12:37), Max: 37 (06-30-18 @ 20:10)  HR: 86 (07-01-18 @ 17:18) (70 - 87)  BP: 130/69 (07-01-18 @ 17:18) (97/58 - 144/73)  RR: 17 (07-01-18 @ 12:37) (17 - 18)  SpO2: 96% (07-01-18 @ 15:42) (96% - 100%)  Wt(kg): --  I&O's Summary    30 Jun 2018 07:01  -  01 Jul 2018 07:00  --------------------------------------------------------  IN: 220 mL / OUT: 0 mL / NET: 220 mL    01 Jul 2018 07:01  -  01 Jul 2018 18:26  --------------------------------------------------------  IN: 240 mL / OUT: 0 mL / NET: 240 mL          Appearance: Normal	  HEENT:   Normal oral mucosa, PERRL, EOMI	  Cardiovascular: Normal S1 S2, No JVD, No murmurs, No edema  Respiratory: Lungs clear to auscultation	  Gastrointestinal:  Soft, Non-tender, + BS	  Extremities: Normal range of motion, No clubbing, cyanosis or edema                                    8.1    10.29 )-----------( 259      ( 01 Jul 2018 04:58 )             28.1     07-01    138  |  91<L>  |  34<H>  ----------------------------<  193<H>  3.8   |  24  |  5.24<H>    Ca    9.7      01 Jul 2018 04:58  Mg     2.2     07-01      proBNP:   Lipid Profile:   HgA1c:   TSH:

## 2018-07-02 LAB
BASOPHILS # BLD AUTO: 0.03 K/UL — SIGNIFICANT CHANGE UP (ref 0–0.2)
BASOPHILS NFR BLD AUTO: 0.3 % — SIGNIFICANT CHANGE UP (ref 0–2)
BLD GP AB SCN SERPL QL: NEGATIVE — SIGNIFICANT CHANGE UP
BUN SERPL-MCNC: 45 MG/DL — HIGH (ref 7–23)
CALCIUM SERPL-MCNC: 9.2 MG/DL — SIGNIFICANT CHANGE UP (ref 8.4–10.5)
CHLORIDE SERPL-SCNC: 92 MMOL/L — LOW (ref 98–107)
CO2 SERPL-SCNC: 24 MMOL/L — SIGNIFICANT CHANGE UP (ref 22–31)
CREAT SERPL-MCNC: 6.3 MG/DL — HIGH (ref 0.5–1.3)
EOSINOPHIL # BLD AUTO: 0.13 K/UL — SIGNIFICANT CHANGE UP (ref 0–0.5)
EOSINOPHIL NFR BLD AUTO: 1.4 % — SIGNIFICANT CHANGE UP (ref 0–6)
GLUCOSE BLDC GLUCOMTR-MCNC: 135 MG/DL — HIGH (ref 70–99)
GLUCOSE BLDC GLUCOMTR-MCNC: 142 MG/DL — HIGH (ref 70–99)
GLUCOSE BLDC GLUCOMTR-MCNC: 153 MG/DL — HIGH (ref 70–99)
GLUCOSE BLDC GLUCOMTR-MCNC: 168 MG/DL — HIGH (ref 70–99)
GLUCOSE BLDC GLUCOMTR-MCNC: 187 MG/DL — HIGH (ref 70–99)
GLUCOSE SERPL-MCNC: 149 MG/DL — HIGH (ref 70–99)
HCT VFR BLD CALC: 23.6 % — LOW (ref 39–50)
HCT VFR BLD CALC: 23.6 % — LOW (ref 39–50)
HCT VFR BLD CALC: 32 % — LOW (ref 39–50)
HGB BLD-MCNC: 10 G/DL — LOW (ref 13–17)
HGB BLD-MCNC: 7 G/DL — CRITICAL LOW (ref 13–17)
HGB BLD-MCNC: 7 G/DL — CRITICAL LOW (ref 13–17)
IMM GRANULOCYTES # BLD AUTO: 0.03 # — SIGNIFICANT CHANGE UP
IMM GRANULOCYTES NFR BLD AUTO: 0.3 % — SIGNIFICANT CHANGE UP (ref 0–1.5)
INR BLD: 1.43 — HIGH (ref 0.88–1.17)
LYMPHOCYTES # BLD AUTO: 1.7 K/UL — SIGNIFICANT CHANGE UP (ref 1–3.3)
LYMPHOCYTES # BLD AUTO: 18.4 % — SIGNIFICANT CHANGE UP (ref 13–44)
MAGNESIUM SERPL-MCNC: 2.3 MG/DL — SIGNIFICANT CHANGE UP (ref 1.6–2.6)
MCHC RBC-ENTMCNC: 27.9 PG — SIGNIFICANT CHANGE UP (ref 27–34)
MCHC RBC-ENTMCNC: 27.9 PG — SIGNIFICANT CHANGE UP (ref 27–34)
MCHC RBC-ENTMCNC: 28.1 PG — SIGNIFICANT CHANGE UP (ref 27–34)
MCHC RBC-ENTMCNC: 29.7 % — LOW (ref 32–36)
MCHC RBC-ENTMCNC: 29.7 % — LOW (ref 32–36)
MCHC RBC-ENTMCNC: 31.3 % — LOW (ref 32–36)
MCV RBC AUTO: 89.9 FL — SIGNIFICANT CHANGE UP (ref 80–100)
MCV RBC AUTO: 94 FL — SIGNIFICANT CHANGE UP (ref 80–100)
MCV RBC AUTO: 94 FL — SIGNIFICANT CHANGE UP (ref 80–100)
MONOCYTES # BLD AUTO: 0.62 K/UL — SIGNIFICANT CHANGE UP (ref 0–0.9)
MONOCYTES NFR BLD AUTO: 6.7 % — SIGNIFICANT CHANGE UP (ref 2–14)
NEUTROPHILS # BLD AUTO: 6.72 K/UL — SIGNIFICANT CHANGE UP (ref 1.8–7.4)
NEUTROPHILS NFR BLD AUTO: 72.9 % — SIGNIFICANT CHANGE UP (ref 43–77)
NRBC # FLD: 0 — SIGNIFICANT CHANGE UP
PLATELET # BLD AUTO: 251 K/UL — SIGNIFICANT CHANGE UP (ref 150–400)
PLATELET # BLD AUTO: 251 K/UL — SIGNIFICANT CHANGE UP (ref 150–400)
PLATELET # BLD AUTO: 263 K/UL — SIGNIFICANT CHANGE UP (ref 150–400)
PMV BLD: 10 FL — SIGNIFICANT CHANGE UP (ref 7–13)
PMV BLD: 10 FL — SIGNIFICANT CHANGE UP (ref 7–13)
PMV BLD: 9.9 FL — SIGNIFICANT CHANGE UP (ref 7–13)
POTASSIUM SERPL-MCNC: 3.6 MMOL/L — SIGNIFICANT CHANGE UP (ref 3.5–5.3)
POTASSIUM SERPL-SCNC: 3.6 MMOL/L — SIGNIFICANT CHANGE UP (ref 3.5–5.3)
PROTHROM AB SERPL-ACNC: 16 SEC — HIGH (ref 9.8–13.1)
RBC # BLD: 2.51 M/UL — LOW (ref 4.2–5.8)
RBC # BLD: 2.51 M/UL — LOW (ref 4.2–5.8)
RBC # BLD: 3.56 M/UL — LOW (ref 4.2–5.8)
RBC # FLD: 15.9 % — HIGH (ref 10.3–14.5)
RBC # FLD: 15.9 % — HIGH (ref 10.3–14.5)
RBC # FLD: 16.2 % — HIGH (ref 10.3–14.5)
RH IG SCN BLD-IMP: NEGATIVE — SIGNIFICANT CHANGE UP
SODIUM SERPL-SCNC: 136 MMOL/L — SIGNIFICANT CHANGE UP (ref 135–145)
WBC # BLD: 7.32 K/UL — SIGNIFICANT CHANGE UP (ref 3.8–10.5)
WBC # BLD: 9.23 K/UL — SIGNIFICANT CHANGE UP (ref 3.8–10.5)
WBC # BLD: 9.23 K/UL — SIGNIFICANT CHANGE UP (ref 3.8–10.5)
WBC # FLD AUTO: 7.32 K/UL — SIGNIFICANT CHANGE UP (ref 3.8–10.5)
WBC # FLD AUTO: 9.23 K/UL — SIGNIFICANT CHANGE UP (ref 3.8–10.5)
WBC # FLD AUTO: 9.23 K/UL — SIGNIFICANT CHANGE UP (ref 3.8–10.5)

## 2018-07-02 PROCEDURE — 99232 SBSQ HOSP IP/OBS MODERATE 35: CPT

## 2018-07-02 PROCEDURE — 93971 EXTREMITY STUDY: CPT | Mod: 26

## 2018-07-02 PROCEDURE — 74230 X-RAY XM SWLNG FUNCJ C+: CPT | Mod: 26

## 2018-07-02 PROCEDURE — 90935 HEMODIALYSIS ONE EVALUATION: CPT

## 2018-07-02 RX ORDER — ACETAMINOPHEN 500 MG
650 TABLET ORAL ONCE
Qty: 0 | Refills: 0 | Status: COMPLETED | OUTPATIENT
Start: 2018-07-02 | End: 2018-07-02

## 2018-07-02 RX ORDER — DIPHENHYDRAMINE HCL 50 MG
25 CAPSULE ORAL ONCE
Qty: 0 | Refills: 0 | Status: COMPLETED | OUTPATIENT
Start: 2018-07-02 | End: 2018-07-02

## 2018-07-02 RX ORDER — IPRATROPIUM/ALBUTEROL SULFATE 18-103MCG
3 AEROSOL WITH ADAPTER (GRAM) INHALATION EVERY 6 HOURS
Qty: 0 | Refills: 0 | Status: DISCONTINUED | OUTPATIENT
Start: 2018-07-02 | End: 2018-07-06

## 2018-07-02 RX ADMIN — Medication 25 MILLIGRAM(S): at 18:09

## 2018-07-02 RX ADMIN — Medication 3 MILLILITER(S): at 03:55

## 2018-07-02 RX ADMIN — CLOPIDOGREL BISULFATE 75 MILLIGRAM(S): 75 TABLET, FILM COATED ORAL at 15:07

## 2018-07-02 RX ADMIN — Medication 216 GRAM(S): at 18:29

## 2018-07-02 RX ADMIN — TAMSULOSIN HYDROCHLORIDE 0.4 MILLIGRAM(S): 0.4 CAPSULE ORAL at 21:28

## 2018-07-02 RX ADMIN — Medication 25 MILLIGRAM(S): at 07:09

## 2018-07-02 RX ADMIN — Medication 5 MILLIGRAM(S): at 18:09

## 2018-07-02 RX ADMIN — Medication 216 GRAM(S): at 05:14

## 2018-07-02 RX ADMIN — Medication 650 MILLIGRAM(S): at 07:09

## 2018-07-02 RX ADMIN — AMIODARONE HYDROCHLORIDE 200 MILLIGRAM(S): 400 TABLET ORAL at 05:14

## 2018-07-02 RX ADMIN — Medication 1: at 18:06

## 2018-07-02 RX ADMIN — Medication 1 TABLET(S): at 15:07

## 2018-07-02 RX ADMIN — Medication 3 MILLILITER(S): at 21:18

## 2018-07-02 RX ADMIN — PANTOPRAZOLE SODIUM 40 MILLIGRAM(S): 20 TABLET, DELAYED RELEASE ORAL at 05:14

## 2018-07-02 RX ADMIN — ATORVASTATIN CALCIUM 40 MILLIGRAM(S): 80 TABLET, FILM COATED ORAL at 21:28

## 2018-07-02 RX ADMIN — Medication 81 MILLIGRAM(S): at 15:07

## 2018-07-02 RX ADMIN — Medication 5 MILLIGRAM(S): at 05:14

## 2018-07-02 NOTE — PROGRESS NOTE ADULT - SUBJECTIVE AND OBJECTIVE BOX
Reid Martinez MD  Interventional Cardiology / Advance Heart Failure and Cardiac Transplant Specialist  [place] Office : [address] [place] [id number]  Tel:   [place] Office : [address] [facility] [address] [address]  Tel: [phone]  Cell : [phone]    Subjective : Pt lying in bed comfortable, not in distress, denies any chest pain or SOB  	  MEDICATIONS:  amiodarone    Tablet 200 milliGRAM(s) Oral daily  aspirin  chewable 81 milliGRAM(s) Oral daily  clopidogrel Tablet 75 milliGRAM(s) Oral daily  metoprolol tartrate 25 milliGRAM(s) Oral two times a day  tamsulosin 0.4 milliGRAM(s) Oral at bedtime    ampicillin  IVPB 2 Gram(s) IV Intermittent every 12 hours    ALBUTerol/ipratropium for Nebulization 3 milliLiter(s) Nebulizer every 6 hours    acetaminophen  Suppository 650 milliGRAM(s) Rectal every 6 hours PRN  busPIRone 5 milliGRAM(s) Oral two times a day    pantoprazole    Tablet 40 milliGRAM(s) Oral before breakfast    atorvastatin 40 milliGRAM(s) Oral at bedtime  dextrose 40% Gel 15 Gram(s) Oral once PRN  dextrose 50% Injectable 12.5 Gram(s) IV Push once  dextrose 50% Injectable 25 Gram(s) IV Push once  dextrose 50% Injectable 25 Gram(s) IV Push once  glucagon  Injectable 1 milliGRAM(s) IntraMuscular once PRN  insulin lispro (HumaLOG) corrective regimen sliding scale   SubCutaneous three times a day before meals  insulin lispro (HumaLOG) corrective regimen sliding scale   SubCutaneous at bedtime    artificial  tears Solution 1 Drop(s) Both EYES four times a day  epoetin georgie Injectable 4000 Unit(s) IV Push <User Schedule>  multivitamin 1 Tablet(s) Oral daily      PHYSICAL EXAM:  T(C): 36.9 (07-02-18 @ 19:49), Max: 36.9 (07-02-18 @ 19:49)  HR: 74 (07-02-18 @ 21:19) (74 - 87)  BP: 109/59 (07-02-18 @ 19:49) (109/59 - 135/66)  RR: 18 (07-02-18 @ 19:49) (16 - 21)  SpO2: 98% (07-02-18 @ 21:19) (97% - 100%)  Wt(kg): --  I&O's Summary    01 Jul 2018 07:01  -  02 Jul 2018 07:00  --------------------------------------------------------  IN: 440 mL / OUT: 0 mL / NET: 440 mL    02 Jul 2018 07:01  -  02 Jul 2018 22:44  --------------------------------------------------------  IN: 700 mL / OUT: 2700 mL / NET: -2000 mL          Appearance: Normal	  HEENT:   Normal oral mucosa, PERRL, EOMI	  Cardiovascular: Normal S1 S2, No JVD, No murmurs, No edema  Respiratory: Lungs clear to auscultation	  Gastrointestinal:  Soft, Non-tender, + BS	  Extremities: Normal range of motion, No clubbing, cyanosis or edema                                    10.0   7.32  )-----------( 263      ( 02 Jul 2018 15:20 )             32.0     07-02    136  |  92<L>  |  45<H>  ----------------------------<  149<H>  3.6   |  24  |  6.30<H>    Ca    9.2      02 Jul 2018 03:10  Mg     2.3     07-02      proBNP:   Lipid Profile:   HgA1c:   TSH:

## 2018-07-02 NOTE — PROGRESS NOTE ADULT - SUBJECTIVE AND OBJECTIVE BOX
Follow Up:      Inverval History/ROS:Patient is a 72y old  Male who presents with a chief complaint of Dyspnea (19 Jun 2018 06:32)    No events      Allergies    No Known Allergies    Intolerances        ANTIMICROBIALS:  ampicillin  IVPB 2 every 12 hours      OTHER MEDS:  acetaminophen  Suppository 650 milliGRAM(s) Rectal every 6 hours PRN  ALBUTerol/ipratropium for Nebulization 3 milliLiter(s) Nebulizer every 6 hours  amiodarone    Tablet 200 milliGRAM(s) Oral daily  artificial  tears Solution 1 Drop(s) Both EYES four times a day  aspirin  chewable 81 milliGRAM(s) Oral daily  atorvastatin 40 milliGRAM(s) Oral at bedtime  busPIRone 5 milliGRAM(s) Oral two times a day  clopidogrel Tablet 75 milliGRAM(s) Oral daily  dextrose 40% Gel 15 Gram(s) Oral once PRN  dextrose 50% Injectable 12.5 Gram(s) IV Push once  dextrose 50% Injectable 25 Gram(s) IV Push once  dextrose 50% Injectable 25 Gram(s) IV Push once  epoetin georgie Injectable 4000 Unit(s) IV Push <User Schedule>  glucagon  Injectable 1 milliGRAM(s) IntraMuscular once PRN  insulin lispro (HumaLOG) corrective regimen sliding scale   SubCutaneous three times a day before meals  insulin lispro (HumaLOG) corrective regimen sliding scale   SubCutaneous at bedtime  metoprolol tartrate 25 milliGRAM(s) Oral two times a day  multivitamin 1 Tablet(s) Oral daily  pantoprazole    Tablet 40 milliGRAM(s) Oral before breakfast  tamsulosin 0.4 milliGRAM(s) Oral at bedtime      Vital Signs Last 24 Hrs  T(C): 36.6 (02 Jul 2018 14:21), Max: 36.6 (02 Jul 2018 14:21)  T(F): 97.9 (02 Jul 2018 14:21), Max: 97.9 (02 Jul 2018 14:21)  HR: 81 (02 Jul 2018 14:21) (74 - 87)  BP: 119/65 (02 Jul 2018 14:21) (112/64 - 135/66)  BP(mean): --  RR: 17 (02 Jul 2018 14:21) (16 - 21)  SpO2: 100% (02 Jul 2018 14:21) (96% - 100%)    PHYSICAL EXAM:  General: [ x] non-toxic  HEAD/EYES: [ ] PERRL [x ] white sclera [ ] icterus  ENT:  [ ] normal [x ] supple [ ] thrush [ ] pharyngeal exudate  Cardiovascular:   [ ] murmur [x ] normal [ ] PPM/AICD  Respiratory:  [x ] clear to ausculation bilaterally  GI:  [ ] soft, non-tender, normal bowel sounds  :  [ ] james [ ] no CVA tenderness   Musculoskeletal:  [ ] no synovitis  Neurologic:  [ ] non-focal exam   Skin:  [ ] no rash  Lymph: [ ] no lymphadenopathy  Psychiatric:  [ ] appropriate affect [ ] alert & oriented  Lines:  [ x] no phlebitis [ ] central line                                7.0    9.23  )-----------( 251      ( 02 Jul 2018 03:10 )             23.6       07-02    136  |  92<L>  |  45<H>  ----------------------------<  149<H>  3.6   |  24  |  6.30<H>    Ca    9.2      02 Jul 2018 03:10  Mg     2.3     07-02            MICROBIOLOGY:    RADIOLOGY:

## 2018-07-02 NOTE — SWALLOW VFSS/MBS ASSESSMENT ADULT - DIAGNOSTIC IMPRESSIONS
This was a limited study as patient was reluctant to accept PO trials.  Patient's body habitus (Shoulder Girdle) also limited view of the airway/trachea. Patient presents with Mild Oral Stage and Moderate to Severe Pharyngeal Stage Dysphagia. The Oral Stage is characterized by adequate oral containment, slow bolus manipulation, slow/delayed tongue motion for anterior to posterior transfer of the bolus which may have been exacerbated by the taste of the barium as patient was exhibiting reluctance to accept PO trials; premature spillage/loss to the hypopharynx for Thin Liquids due to reduced oral/tongue control.  There was adequate oral clearance post swallow.    The Pharyngeal Stage is characterized by delayed initiation of the pharyngeal swallow (Bolus head is in the laryngeal vestibule for thin liquids), reduced laryngeal elevation with incomplete laryngeal vestibular closure, adequate tongue base retraction and adequate pharyngeal constriction. There is adequate pharyngeal clearance post swallow.  There was Aspiration observed before, and during the swallow for Thin Liquids. Patient is not sensate to the Aspiration given no reflexive cough response. There was No Aspiration observed before, during or after the swallow for puree/honey thick liquids/nectar thick liquids; however it was with very limited oral intake. Therefore, suggest continuing with a Puree and Honey Thick Liquid diet. This was a limited study as patient was reluctant to accept PO trials.  Patient's body habitus (Shoulder Girdle) also limited view of the airway/trachea. Patient presents with Mild Oral Stage and Moderate to Severe Pharyngeal Stage Dysphagia. The Oral Stage is characterized by adequate oral containment, slow bolus manipulation, slow/delayed tongue motion for anterior to posterior transfer of the bolus which may have been exacerbated by the taste of the barium as patient was exhibiting reluctance to accept PO trials; premature spillage/loss to the hypopharynx for Thin Liquids due to reduced oral/tongue control.  There was adequate oral clearance post swallow.    The Pharyngeal Stage is characterized by delayed initiation of the pharyngeal swallow (Bolus head is in the laryngeal vestibule for thin liquids), reduced laryngeal elevation with incomplete laryngeal vestibular closure, adequate tongue base retraction and adequate pharyngeal constriction. There is adequate pharyngeal clearance post swallow.  There was Aspiration observed before and during the swallow for Thin Liquids. Patient is not sensate to the Aspiration given no reflexive cough response. There was No Aspiration observed before, during or after the swallow for puree/honey thick liquids/nectar thick liquids; however it was with very limited small bolus amount. Therefore, suggest continuing with a Puree and Honey Thick Liquid diet.

## 2018-07-02 NOTE — SWALLOW VFSS/MBS ASSESSMENT ADULT - RECOMMENDED CONSISTENCY
1.) Continue with Puree and Honey Thick Liquids  2.) Feeding/Swallowing Guidelines: Sit upright, provide puree via teaspoon amount, allow patient to swallow prior to next presentation, small single cup sip of honey thick liquids.  3.) Aspiration Precautions  4.) Maintain Good Oral Hygiene Care

## 2018-07-02 NOTE — PROGRESS NOTE ADULT - SUBJECTIVE AND OBJECTIVE BOX
JIMI BUCHANAN:[id number],   72yMale followed for:  No Known Allergies    PAST MEDICAL & SURGICAL HISTORY:  CVA (cerebral vascular accident)  COPD (chronic obstructive pulmonary disease)  BPH (benign prostatic hyperplasia)  Bipolar affective disorder  CKD (chronic kidney disease)  Pancreatitis  Hypertension  Dyslipidemia  Diabetes mellitus  Coronary artery disease  HLD (hyperlipidemia)  Anemia  Acute renal failure  CKD (chronic kidney disease)  COPD (chronic obstructive pulmonary disease)  Fainting  Cardiac arrhythmia  Dizziness  Hydronephrosis  Cholecystitis  Bipolar 1 disorder  DM (diabetes mellitus)  HTN (hypertension)  Lumbar radiculopathy  Left heart failure  Reflux esophagitis  Coronary atherosclerosis  History of cardiac catheterization  No significant past surgical history    FAMILY HISTORY:  No pertinent family history in first degree relatives    MEDICATIONS  (STANDING):  ALBUTerol/ipratropium for Nebulization 3 milliLiter(s) Nebulizer every 6 hours  amiodarone    Tablet 200 milliGRAM(s) Oral daily  ampicillin  IVPB 2 Gram(s) IV Intermittent every 12 hours  artificial  tears Solution 1 Drop(s) Both EYES four times a day  aspirin  chewable 81 milliGRAM(s) Oral daily  atorvastatin 40 milliGRAM(s) Oral at bedtime  busPIRone 5 milliGRAM(s) Oral two times a day  clopidogrel Tablet 75 milliGRAM(s) Oral daily  dextrose 50% Injectable 12.5 Gram(s) IV Push once  dextrose 50% Injectable 25 Gram(s) IV Push once  dextrose 50% Injectable 25 Gram(s) IV Push once  epoetin georgie Injectable 4000 Unit(s) IV Push <User Schedule>  insulin lispro (HumaLOG) corrective regimen sliding scale   SubCutaneous three times a day before meals  insulin lispro (HumaLOG) corrective regimen sliding scale   SubCutaneous at bedtime  metoprolol tartrate 25 milliGRAM(s) Oral two times a day  multivitamin 1 Tablet(s) Oral daily  pantoprazole    Tablet 40 milliGRAM(s) Oral before breakfast  tamsulosin 0.4 milliGRAM(s) Oral at bedtime    MEDICATIONS  (PRN):  acetaminophen  Suppository 650 milliGRAM(s) Rectal every 6 hours PRN For Temp greater than 38 C (100.4 F)  dextrose 40% Gel 15 Gram(s) Oral once PRN Blood Glucose LESS THAN 70 milliGRAM(s)/deciliter  glucagon  Injectable 1 milliGRAM(s) IntraMuscular once PRN Glucose LESS THAN 70 milligrams/deciliter      Vital Signs Last 24 Hrs  T(C): 36.4 (02 Jul 2018 06:45), Max: 36.6 (01 Jul 2018 12:37)  T(F): 97.5 (02 Jul 2018 06:45), Max: 97.9 (01 Jul 2018 12:37)  HR: 87 (02 Jul 2018 06:45) (71 - 87)  BP: 133/71 (02 Jul 2018 06:45) (97/58 - 133/71)  BP(mean): --  RR: 21 (02 Jul 2018 06:45) (17 - 21)  SpO2: 99% (02 Jul 2018 05:10) (96% - 100%)  nc/at  s1s2  cta  soft, nt, nd no guarding or rebound  no c/c/e    CBC Full  -  ( 02 Jul 2018 03:10 )  WBC Count : 9.23 K/uL  Hemoglobin : 7.0 g/dL  Hematocrit : 23.6 %  Platelet Count - Automated : 251 K/uL  Mean Cell Volume : 94.0 fL  Mean Cell Hemoglobin : 27.9 pg  Mean Cell Hemoglobin Concentration : 29.7 %  Auto Neutrophil # : 6.72 K/uL  Auto Lymphocyte # : 1.70 K/uL  Auto Monocyte # : 0.62 K/uL  Auto Eosinophil # : 0.13 K/uL  Auto Basophil # : 0.03 K/uL  Auto Neutrophil % : 72.9 %  Auto Lymphocyte % : 18.4 %  Auto Monocyte % : 6.7 %  Auto Eosinophil % : 1.4 %  Auto Basophil % : 0.3 %    07-02    136  |  92<L>  |  45<H>  ----------------------------<  149<H>  3.6   |  24  |  6.30<H>    Ca    9.2      02 Jul 2018 03:10  Mg     2.3     07-02      PT/INR - ( 02 Jul 2018 03:10 )   PT: 16.0 SEC;   INR: 1.43          PTT - ( 01 Jul 2018 04:58 )  PTT:34.9 SEC

## 2018-07-02 NOTE — PROVIDER CONTACT NOTE (CRITICAL VALUE NOTIFICATION) - ACTION/TREATMENT ORDERED:
Patient to be transfused in dialysis
will adjust heparin as per nomogram
Abx therapy cont-will discuss with ID

## 2018-07-02 NOTE — PROGRESS NOTE ADULT - SUBJECTIVE AND OBJECTIVE BOX
[facility] DIVISION OF KIDNEY DISEASES AND HYPERTENSION --   --------------------------------------------------------------------------------  Chief Complaint: ESRD/Ongoing hemodialysis requirement    24 hour events/subjective: Pt. seen and examined during HD today. Pt. feels better and denies CP, SOB, dizziness or HA. Pt. receiving blood transfusion during HD today.     PAST HISTORY  --------------------------------------------------------------------------------  No significant changes to PMH, PSH, FHx, SHx, unless otherwise noted    ALLERGIES & MEDICATIONS  --------------------------------------------------------------------------------  Allergies    No Known Allergies    Intolerances    Standing Inpatient Medications  ALBUTerol/ipratropium for Nebulization 3 milliLiter(s) Nebulizer every 6 hours  amiodarone    Tablet 200 milliGRAM(s) Oral daily  ampicillin  IVPB 2 Gram(s) IV Intermittent every 12 hours  artificial  tears Solution 1 Drop(s) Both EYES four times a day  aspirin  chewable 81 milliGRAM(s) Oral daily  atorvastatin 40 milliGRAM(s) Oral at bedtime  busPIRone 5 milliGRAM(s) Oral two times a day  clopidogrel Tablet 75 milliGRAM(s) Oral daily  dextrose 50% Injectable 12.5 Gram(s) IV Push once  dextrose 50% Injectable 25 Gram(s) IV Push once  dextrose 50% Injectable 25 Gram(s) IV Push once  epoetin georgie Injectable 4000 Unit(s) IV Push <User Schedule>  insulin lispro (HumaLOG) corrective regimen sliding scale   SubCutaneous three times a day before meals  insulin lispro (HumaLOG) corrective regimen sliding scale   SubCutaneous at bedtime  metoprolol tartrate 25 milliGRAM(s) Oral two times a day  multivitamin 1 Tablet(s) Oral daily  pantoprazole    Tablet 40 milliGRAM(s) Oral before breakfast  tamsulosin 0.4 milliGRAM(s) Oral at bedtime    REVIEW OF SYSTEMS  --------------------------------------------------------------------------------  Gen: No fever  Respiratory: No dyspnea  CV: No chest pain  GI: No abdominal pain  Neuro: No dizziness  VITALS/PHYSICAL EXAM  --------------------------------------------------------------------------------  T(C): 36.4 (07-02-18 @ 08:05), Max: 36.6 (07-01-18 @ 12:37)  HR: 78 (07-02-18 @ 08:05) (71 - 87)  BP: 135/66 (07-02-18 @ 08:05) (97/58 - 135/66)  RR: 19 (07-02-18 @ 08:05) (17 - 21)  SpO2: 99% (07-02-18 @ 05:10) (96% - 100%)  Wt(kg): --    07-01-18 @ 07:01  -  07-02-18 @ 07:00  --------------------------------------------------------  IN: 440 mL / OUT: 0 mL / NET: 440 mL    Physical Exam:  Gen: resting, NAD  	Pulm: fair air entry B/L  	CV: S1S2  	Abd: Soft, NT   	Ext: Bilateral trace edema present  	Neuro: Awake  	Vascular access: RUE AVF site: no bleeding/redness     LABS/STUDIES  --------------------------------------------------------------------------------              7.0    9.23  >-----------<  251      [07-02-18 @ 03:10]              23.6     136  |  92  |  45  ----------------------------<  149      [07-02-18 @ 03:10]  3.6   |  24  |  6.30        Ca     9.2     [07-02-18 @ 03:10]      Mg     2.3     [07-02-18 @ 03:10]    HBsAb <3.0      [06-19-18 @ 17:05]  HBsAg NEGATIVE      [06-19-18 @ 17:05]  HBcAb Nonreactive      [06-19-18 @ 17:05]  HCV 0.35, Nonreactive Hepatitis C AB  S/CO Ratio                        Interpretation  < 1.0                                     Non-Reactive  1.0 - 4.9                           Weakly-Reactive  > 5.0                                 Reactive  Non-Reactive: [place] with a non-reactive HCV antibody  result is considered uninfected.  No further action is  needed unless recent infection is suspected.  In these  cases, consider repeat testing later to detect  seroconversion..  Weakly-Reactive: HCV antibody test is abnormal, HCV RNA  Qualitative test will follow.  Reactive: HCV antibody test is abnormal, HCV RNA  Qualitative test will follow.  Note: HCV antibody testing is performed on the [employer] system.      [06-19-18 @ 17:05]

## 2018-07-02 NOTE — SWALLOW VFSS/MBS ASSESSMENT ADULT - PHARYNGEAL PHASE COMMENTS
adequate initiation of the pharyngeal swallow, adequate laryngeal elevation, adequate tongue base retraction, adequate pharyngeal constriction delayed initiation of the pharyngeal swallow, adequate laryngeal elevation, adequate tongue base retraction, adequate pharyngeal constriction delayed initiation of the pharyngeal swallow, reduced laryngeal elevation, adequate tongue base retraction, adequate pharyngeal constriction

## 2018-07-02 NOTE — PROGRESS NOTE ADULT - PROBLEM SELECTOR PLAN 1
Pt. tolerating HD. BP stable during HD. AVF functioning well. Pt. receiving blood transfusion during HD today. Monitor BP and labs

## 2018-07-02 NOTE — PROGRESS NOTE ADULT - SUBJECTIVE AND OBJECTIVE BOX
chief complaint : dyspnea        SUBJECTIVE / OVERNIGHT EVENTS: pt appears comfortable, denies chest pain, shortness of breath , nausea, vomiting,     MEDICATIONS  (STANDING):  ALBUTerol/ipratropium for Nebulization 3 milliLiter(s) Nebulizer every 6 hours  amiodarone    Tablet 200 milliGRAM(s) Oral daily  ampicillin  IVPB 2 Gram(s) IV Intermittent every 12 hours  artificial  tears Solution 1 Drop(s) Both EYES four times a day  aspirin  chewable 81 milliGRAM(s) Oral daily  atorvastatin 40 milliGRAM(s) Oral at bedtime  busPIRone 5 milliGRAM(s) Oral two times a day  clopidogrel Tablet 75 milliGRAM(s) Oral daily  dextrose 50% Injectable 12.5 Gram(s) IV Push once  dextrose 50% Injectable 25 Gram(s) IV Push once  dextrose 50% Injectable 25 Gram(s) IV Push once  epoetin georgie Injectable 4000 Unit(s) IV Push <User Schedule>  insulin lispro (HumaLOG) corrective regimen sliding scale   SubCutaneous three times a day before meals  insulin lispro (HumaLOG) corrective regimen sliding scale   SubCutaneous at bedtime  metoprolol tartrate 25 milliGRAM(s) Oral two times a day  multivitamin 1 Tablet(s) Oral daily  pantoprazole    Tablet 40 milliGRAM(s) Oral before breakfast  tamsulosin 0.4 milliGRAM(s) Oral at bedtime    MEDICATIONS  (PRN):  acetaminophen  Suppository 650 milliGRAM(s) Rectal every 6 hours PRN For Temp greater than 38 C (100.4 F)  dextrose 40% Gel 15 Gram(s) Oral once PRN Blood Glucose LESS THAN 70 milliGRAM(s)/deciliter  glucagon  Injectable 1 milliGRAM(s) IntraMuscular once PRN Glucose LESS THAN 70 milligrams/deciliter    Vital Signs Last 24 Hrs  T(C): 36.6 (02 Jul 2018 14:21), Max: 36.6 (02 Jul 2018 14:21)  T(F): 97.9 (02 Jul 2018 14:21), Max: 97.9 (02 Jul 2018 14:21)  HR: 80 (02 Jul 2018 16:10) (74 - 87)  BP: 119/65 (02 Jul 2018 14:21) (112/64 - 135/66)  BP(mean): --  RR: 17 (02 Jul 2018 14:21) (16 - 21)  SpO2: 97% (02 Jul 2018 16:10) (97% - 100%)    Constitutional: No fever, fatigue  Skin: No rash.  Eyes: No recent vision problems or eye pain.  ENT: No congestion, ear pain, or sore throat.  Cardiovascular: No chest pain or palpation.  Respiratory: No cough, shortness of breath, congestion, or wheezing.  Gastrointestinal: No abdominal pain, nausea, vomiting, or diarrhea.  Genitourinary: No dysuria.  Musculoskeletal: No joint swelling.  Neurologic: No headache.    PHYSICAL EXAM:  GENERAL: NAD  EYES: EOMI, PERRLA  NECK: Supple, No JVD  CHEST/LUNG: dec breath sounds at bases,  HEART:  S1 , S2 +  ABDOMEN: soft bs+  EXTREMITIES:  trace edema+  NEUROLOGY:alert awake     LABS:  07-02    136  |  92<L>  |  45<H>  ----------------------------<  149<H>  3.6   |  24  |  6.30<H>    Ca    9.2      02 Jul 2018 03:10  Mg     2.3     07-02      Creatinine Trend: 6.30 <--, 5.24 <--, 3.84 <--, 4.85 <--, 3.48 <--, 4.59 <--, 2.47 <--                        10.0   7.32  )-----------( 263      ( 02 Jul 2018 15:20 )             32.0     Urine Studies:              PT/INR - ( 02 Jul 2018 03:10 )   PT: 16.0 SEC;   INR: 1.43          PTT - ( 01 Jul 2018 04:58 )  PTT:34.9 SEC

## 2018-07-02 NOTE — SWALLOW VFSS/MBS ASSESSMENT ADULT - COMMENTS
72 year old male with a PMHx of CAD S/P stent placement x 12-14 stents placed in 1990's and BABAR x 5 placed in Sept 2017, ischemic cardiomyopathy with severe LV dysfunction, atrial fibrillation on Coumadin, ESRD on HD M/W/F with chronic james, CVA with right facial droop, COPD, HTN, HLD, DM, bipolar disorder and dementia was transferred from [facility] for respiratory distress.    Patient was seen for a Clinical Swallow Eval on 6/30/2018 (See Consult).

## 2018-07-02 NOTE — PROGRESS NOTE ADULT - ASSESSMENT
72 year old with esrd and CHF presents with shortness of breath and hypoxia.   HD via AV fistula  Had leukocytosis and fever.- now resolved     enterococcal bacteremia cleared  CT with ? pyelo. likely urinary source  Echo demonstrates no evidence of endocarditis    Suggest:    Continue ampicillin 2 gm iv q 12 for 14 days of abx with ampicillin through 7/4/2018,    Call with additional questions

## 2018-07-03 LAB
BUN SERPL-MCNC: 31 MG/DL — HIGH (ref 7–23)
CALCIUM SERPL-MCNC: 9.3 MG/DL — SIGNIFICANT CHANGE UP (ref 8.4–10.5)
CHLORIDE SERPL-SCNC: 93 MMOL/L — LOW (ref 98–107)
CO2 SERPL-SCNC: 26 MMOL/L — SIGNIFICANT CHANGE UP (ref 22–31)
CREAT SERPL-MCNC: 4.15 MG/DL — HIGH (ref 0.5–1.3)
GLUCOSE BLDC GLUCOMTR-MCNC: 126 MG/DL — HIGH (ref 70–99)
GLUCOSE BLDC GLUCOMTR-MCNC: 167 MG/DL — HIGH (ref 70–99)
GLUCOSE BLDC GLUCOMTR-MCNC: 187 MG/DL — HIGH (ref 70–99)
GLUCOSE BLDC GLUCOMTR-MCNC: 197 MG/DL — HIGH (ref 70–99)
GLUCOSE SERPL-MCNC: 118 MG/DL — HIGH (ref 70–99)
HCT VFR BLD CALC: 27 % — LOW (ref 39–50)
HCT VFR BLD CALC: 27.1 % — LOW (ref 39–50)
HGB BLD-MCNC: 8.5 G/DL — LOW (ref 13–17)
HGB BLD-MCNC: 8.5 G/DL — LOW (ref 13–17)
MCHC RBC-ENTMCNC: 28.3 PG — SIGNIFICANT CHANGE UP (ref 27–34)
MCHC RBC-ENTMCNC: 28.3 PG — SIGNIFICANT CHANGE UP (ref 27–34)
MCHC RBC-ENTMCNC: 31.4 % — LOW (ref 32–36)
MCHC RBC-ENTMCNC: 31.5 % — LOW (ref 32–36)
MCV RBC AUTO: 90 FL — SIGNIFICANT CHANGE UP (ref 80–100)
MCV RBC AUTO: 90.3 FL — SIGNIFICANT CHANGE UP (ref 80–100)
NRBC # FLD: 0 — SIGNIFICANT CHANGE UP
NRBC # FLD: 0 — SIGNIFICANT CHANGE UP
PLATELET # BLD AUTO: 222 K/UL — SIGNIFICANT CHANGE UP (ref 150–400)
PLATELET # BLD AUTO: 256 K/UL — SIGNIFICANT CHANGE UP (ref 150–400)
PMV BLD: 9.6 FL — SIGNIFICANT CHANGE UP (ref 7–13)
PMV BLD: 9.9 FL — SIGNIFICANT CHANGE UP (ref 7–13)
POTASSIUM SERPL-MCNC: 3.6 MMOL/L — SIGNIFICANT CHANGE UP (ref 3.5–5.3)
POTASSIUM SERPL-SCNC: 3.6 MMOL/L — SIGNIFICANT CHANGE UP (ref 3.5–5.3)
RBC # BLD: 3 M/UL — LOW (ref 4.2–5.8)
RBC # BLD: 3 M/UL — LOW (ref 4.2–5.8)
RBC # FLD: 16.1 % — HIGH (ref 10.3–14.5)
RBC # FLD: 16.3 % — HIGH (ref 10.3–14.5)
SODIUM SERPL-SCNC: 137 MMOL/L — SIGNIFICANT CHANGE UP (ref 135–145)
WBC # BLD: 6.3 K/UL — SIGNIFICANT CHANGE UP (ref 3.8–10.5)
WBC # BLD: 6.65 K/UL — SIGNIFICANT CHANGE UP (ref 3.8–10.5)
WBC # FLD AUTO: 6.3 K/UL — SIGNIFICANT CHANGE UP (ref 3.8–10.5)
WBC # FLD AUTO: 6.65 K/UL — SIGNIFICANT CHANGE UP (ref 3.8–10.5)

## 2018-07-03 RX ADMIN — Medication 5 MILLIGRAM(S): at 17:01

## 2018-07-03 RX ADMIN — Medication 25 MILLIGRAM(S): at 17:01

## 2018-07-03 RX ADMIN — CLOPIDOGREL BISULFATE 75 MILLIGRAM(S): 75 TABLET, FILM COATED ORAL at 09:00

## 2018-07-03 RX ADMIN — Medication 1 TABLET(S): at 09:00

## 2018-07-03 RX ADMIN — Medication 25 MILLIGRAM(S): at 05:31

## 2018-07-03 RX ADMIN — Medication 216 GRAM(S): at 05:31

## 2018-07-03 RX ADMIN — Medication 1: at 18:04

## 2018-07-03 RX ADMIN — Medication 1 DROP(S): at 09:00

## 2018-07-03 RX ADMIN — ATORVASTATIN CALCIUM 40 MILLIGRAM(S): 80 TABLET, FILM COATED ORAL at 21:50

## 2018-07-03 RX ADMIN — Medication 5 MILLIGRAM(S): at 05:31

## 2018-07-03 RX ADMIN — Medication 216 GRAM(S): at 17:00

## 2018-07-03 RX ADMIN — PANTOPRAZOLE SODIUM 40 MILLIGRAM(S): 20 TABLET, DELAYED RELEASE ORAL at 05:31

## 2018-07-03 RX ADMIN — Medication 1: at 13:23

## 2018-07-03 RX ADMIN — TAMSULOSIN HYDROCHLORIDE 0.4 MILLIGRAM(S): 0.4 CAPSULE ORAL at 21:50

## 2018-07-03 RX ADMIN — Medication 81 MILLIGRAM(S): at 08:59

## 2018-07-03 RX ADMIN — AMIODARONE HYDROCHLORIDE 200 MILLIGRAM(S): 400 TABLET ORAL at 05:31

## 2018-07-03 RX ADMIN — Medication 1 DROP(S): at 17:01

## 2018-07-03 RX ADMIN — Medication 1 DROP(S): at 23:59

## 2018-07-03 NOTE — PROGRESS NOTE ADULT - SUBJECTIVE AND OBJECTIVE BOX
chief complaint : dyspnea        SUBJECTIVE / OVERNIGHT EVENTS: pt appears comfortable, denies chest pain, shortness of breath , nausea, vomiting,     MEDICATIONS  (STANDING):  amiodarone    Tablet 200 milliGRAM(s) Oral daily  ampicillin  IVPB 2 Gram(s) IV Intermittent every 12 hours  artificial  tears Solution 1 Drop(s) Both EYES four times a day  aspirin  chewable 81 milliGRAM(s) Oral daily  atorvastatin 40 milliGRAM(s) Oral at bedtime  busPIRone 5 milliGRAM(s) Oral two times a day  clopidogrel Tablet 75 milliGRAM(s) Oral daily  dextrose 50% Injectable 12.5 Gram(s) IV Push once  dextrose 50% Injectable 25 Gram(s) IV Push once  dextrose 50% Injectable 25 Gram(s) IV Push once  epoetin georgie Injectable 4000 Unit(s) IV Push <User Schedule>  insulin lispro (HumaLOG) corrective regimen sliding scale   SubCutaneous three times a day before meals  insulin lispro (HumaLOG) corrective regimen sliding scale   SubCutaneous at bedtime  metoprolol tartrate 25 milliGRAM(s) Oral two times a day  multivitamin 1 Tablet(s) Oral daily  pantoprazole    Tablet 40 milliGRAM(s) Oral before breakfast  tamsulosin 0.4 milliGRAM(s) Oral at bedtime    MEDICATIONS  (PRN):  acetaminophen  Suppository 650 milliGRAM(s) Rectal every 6 hours PRN For Temp greater than 38 C (100.4 F)  ALBUTerol/ipratropium for Nebulization 3 milliLiter(s) Nebulizer every 6 hours PRN Shortness of Breath  dextrose 40% Gel 15 Gram(s) Oral once PRN Blood Glucose LESS THAN 70 milliGRAM(s)/deciliter  glucagon  Injectable 1 milliGRAM(s) IntraMuscular once PRN Glucose LESS THAN 70 milligrams/deciliter    Vital Signs Last 24 Hrs  T(C): 36.7 (03 Jul 2018 19:18), Max: 36.9 (03 Jul 2018 05:19)  T(F): 98.1 (03 Jul 2018 19:18), Max: 98.4 (03 Jul 2018 05:19)  HR: 72 (03 Jul 2018 22:28) (70 - 82)  BP: 110/60 (03 Jul 2018 19:18) (106/56 - 125/64)  BP(mean): --  RR: 16 (03 Jul 2018 19:18) (16 - 18)  SpO2: 95% (03 Jul 2018 22:28) (94% - 100%)    Constitutional: No fever, fatigue  Skin: No rash.  Eyes: No recent vision problems or eye pain.  ENT: No congestion, ear pain, or sore throat.  Cardiovascular: No chest pain or palpation.  Respiratory: No cough, shortness of breath, congestion, or wheezing.  Gastrointestinal: No abdominal pain, nausea, vomiting, or diarrhea.  Genitourinary: No dysuria.  Musculoskeletal: No joint swelling.  Neurologic: No headache.    PHYSICAL EXAM:  GENERAL: NAD  EYES: EOMI, PERRLA  NECK: Supple, No JVD  CHEST/LUNG: dec breath sounds at bases,  HEART:  S1 , S2 +  ABDOMEN: soft bs+  EXTREMITIES:  trace edema+  NEUROLOGY:alert awake     LABS:  07-03    137  |  93<L>  |  31<H>  ----------------------------<  118<H>  3.6   |  26  |  4.15<H>    Ca    9.3      03 Jul 2018 03:10  Mg     2.3     07-02      Creatinine Trend: 4.15 <--, 6.30 <--, 5.24 <--, 3.84 <--, 4.85 <--, 3.48 <--, 4.59 <--                        8.5    6.65  )-----------( 222      ( 03 Jul 2018 16:47 )             27.0     Urine Studies:              PT/INR - ( 02 Jul 2018 03:10 )   PT: 16.0 SEC;   INR: 1.43

## 2018-07-03 NOTE — PROGRESS NOTE ADULT - ATTENDING COMMENTS
Agree with above NP note.  cv stable   clinically improved  cont volume removal with hd
Agree with above NP note.  cv stable  aggressive volume removal with hd  cont cv meds as ordered  ct a/p noted  med f/u
agree with above note  events noted  IV abx  volume removal w HD  BP stable
agree with above NP note.  increased dyspnea  volume removal with hd
Patient seen on dialysis.  Tolerating treatment/fluid removal.  Monitor hemoglobin for which Aranesp, etc. may be needed.  Iron studies as noted.
I saw and evaluated the patient this AM with Dr. Barakat.  Yesterday the patient was unable to tolerate HD due to hypotension and tachycardia.  Although he has rales today he was on room air and not in apparent respiratory distress.  We will hold off on HD today as the patient is now septic and was not able to tolerate HD yesterday.  Will re-evaluate tomorrow.  If respiratory status worsens we can attempt HD with midodrine or pressor support.
I saw and evaluated the patient this morning.  At that time he was eating breakfast and was not in apparent respiratory distress.  However, I spoke wit the RN on the floor and the patient's respiratory status is worsening.  Although he is saturating 100 % on NC he is tachypneic.  I spoke with the nurses in the dialysis unit and we have expedited bedside HD.  We will start with a UF goal of 500 and then titrate up as tolerated.  Last systolic BP per RN on floor was > 140.  No need for midodrine.
poss dc after inr therapeutic
poss dc after inr therapeutic

## 2018-07-03 NOTE — PROGRESS NOTE ADULT - SUBJECTIVE AND OBJECTIVE BOX
Reid Martinez MD  Interventional Cardiology / Advance Heart Failure and Cardiac Transplant Specialist  [place] Office : [phone] [place] [address]  Tel:   [place] Office : [address] [facility] [place] [address]  Tel: [phone]  Cell : [phone]    Subjective : Pt lying in bed comfortable, not in distress, denies any chest pain or SOB  	  MEDICATIONS:  amiodarone    Tablet 200 milliGRAM(s) Oral daily  aspirin  chewable 81 milliGRAM(s) Oral daily  clopidogrel Tablet 75 milliGRAM(s) Oral daily  metoprolol tartrate 25 milliGRAM(s) Oral two times a day  tamsulosin 0.4 milliGRAM(s) Oral at bedtime  ampicillin  IVPB 2 Gram(s) IV Intermittent every 12 hours  ALBUTerol/ipratropium for Nebulization 3 milliLiter(s) Nebulizer every 6 hours PRN  acetaminophen  Suppository 650 milliGRAM(s) Rectal every 6 hours PRN  busPIRone 5 milliGRAM(s) Oral two times a day  pantoprazole    Tablet 40 milliGRAM(s) Oral before breakfast    atorvastatin 40 milliGRAM(s) Oral at bedtime  dextrose 40% Gel 15 Gram(s) Oral once PRN  dextrose 50% Injectable 12.5 Gram(s) IV Push once  dextrose 50% Injectable 25 Gram(s) IV Push once  dextrose 50% Injectable 25 Gram(s) IV Push once  glucagon  Injectable 1 milliGRAM(s) IntraMuscular once PRN  insulin lispro (HumaLOG) corrective regimen sliding scale   SubCutaneous three times a day before meals  insulin lispro (HumaLOG) corrective regimen sliding scale   SubCutaneous at bedtime    artificial  tears Solution 1 Drop(s) Both EYES four times a day  epoetin georgie Injectable 4000 Unit(s) IV Push <User Schedule>  multivitamin 1 Tablet(s) Oral daily      PHYSICAL EXAM:  T(C): 36.7 (07-03-18 @ 12:25), Max: 36.9 (07-02-18 @ 19:49)  HR: 74 (07-03-18 @ 12:25) (72 - 82)  BP: 106/56 (07-03-18 @ 12:25) (106/56 - 125/64)  RR: 17 (07-03-18 @ 12:25) (16 - 18)  SpO2: 100% (07-03-18 @ 12:25) (94% - 100%)  Wt(kg): --  I&O's Summary    02 Jul 2018 07:01  -  03 Jul 2018 07:00  --------------------------------------------------------  IN: 700 mL / OUT: 2700 mL / NET: -2000 mL          Appearance: Normal	  HEENT:   Normal oral mucosa, PERRL, EOMI	  Cardiovascular: Normal S1 S2, No JVD, No murmurs, No edema  Respiratory: Lungs clear to auscultation	  Gastrointestinal:  Soft, Non-tender, + BS	  Extremities: Normal range of motion, No clubbing, cyanosis or edema                                    8.5    6.30  )-----------( 256      ( 03 Jul 2018 03:10 )             27.1     07-03    137  |  93<L>  |  31<H>  ----------------------------<  118<H>  3.6   |  26  |  4.15<H>    Ca    9.3      03 Jul 2018 03:10  Mg     2.3     07-02      proBNP:   Lipid Profile:   HgA1c:   TSH:

## 2018-07-03 NOTE — PROGRESS NOTE ADULT - SUBJECTIVE AND OBJECTIVE BOX
JIMI BUCHANAN:[id number],   72yMale followed for:  No Known Allergies    PAST MEDICAL & SURGICAL HISTORY:  CVA (cerebral vascular accident)  COPD (chronic obstructive pulmonary disease)  BPH (benign prostatic hyperplasia)  Bipolar affective disorder  CKD (chronic kidney disease)  Pancreatitis  Hypertension  Dyslipidemia  Diabetes mellitus  Coronary artery disease  HLD (hyperlipidemia)  Anemia  Acute renal failure  CKD (chronic kidney disease)  COPD (chronic obstructive pulmonary disease)  Fainting  Cardiac arrhythmia  Dizziness  Hydronephrosis  Cholecystitis  Bipolar 1 disorder  DM (diabetes mellitus)  HTN (hypertension)  Lumbar radiculopathy  Left heart failure  Reflux esophagitis  Coronary atherosclerosis  History of cardiac catheterization  No significant past surgical history    FAMILY HISTORY:  No pertinent family history in first degree relatives    MEDICATIONS  (STANDING):  amiodarone    Tablet 200 milliGRAM(s) Oral daily  ampicillin  IVPB 2 Gram(s) IV Intermittent every 12 hours  artificial  tears Solution 1 Drop(s) Both EYES four times a day  aspirin  chewable 81 milliGRAM(s) Oral daily  atorvastatin 40 milliGRAM(s) Oral at bedtime  busPIRone 5 milliGRAM(s) Oral two times a day  clopidogrel Tablet 75 milliGRAM(s) Oral daily  dextrose 50% Injectable 12.5 Gram(s) IV Push once  dextrose 50% Injectable 25 Gram(s) IV Push once  dextrose 50% Injectable 25 Gram(s) IV Push once  epoetin georgie Injectable 4000 Unit(s) IV Push <User Schedule>  insulin lispro (HumaLOG) corrective regimen sliding scale   SubCutaneous three times a day before meals  insulin lispro (HumaLOG) corrective regimen sliding scale   SubCutaneous at bedtime  metoprolol tartrate 25 milliGRAM(s) Oral two times a day  multivitamin 1 Tablet(s) Oral daily  pantoprazole    Tablet 40 milliGRAM(s) Oral before breakfast  tamsulosin 0.4 milliGRAM(s) Oral at bedtime    MEDICATIONS  (PRN):  acetaminophen  Suppository 650 milliGRAM(s) Rectal every 6 hours PRN For Temp greater than 38 C (100.4 F)  ALBUTerol/ipratropium for Nebulization 3 milliLiter(s) Nebulizer every 6 hours PRN Shortness of Breath  dextrose 40% Gel 15 Gram(s) Oral once PRN Blood Glucose LESS THAN 70 milliGRAM(s)/deciliter  glucagon  Injectable 1 milliGRAM(s) IntraMuscular once PRN Glucose LESS THAN 70 milligrams/deciliter      Vital Signs Last 24 Hrs  T(C): 36.9 (03 Jul 2018 05:19), Max: 36.9 (02 Jul 2018 19:49)  T(F): 98.4 (03 Jul 2018 05:19), Max: 98.4 (02 Jul 2018 19:49)  HR: 78 (03 Jul 2018 05:19) (74 - 85)  BP: 125/64 (03 Jul 2018 05:19) (109/59 - 135/66)  BP(mean): --  RR: 16 (03 Jul 2018 05:19) (16 - 19)  SpO2: 100% (03 Jul 2018 05:19) (96% - 100%)  nc/at  s1s2  cta  soft, nt, nd no guarding or rebound  no c/c/e    CBC Full  -  ( 03 Jul 2018 03:10 )  WBC Count : 6.30 K/uL  Hemoglobin : 8.5 g/dL  Hematocrit : 27.1 %  Platelet Count - Automated : 256 K/uL  Mean Cell Volume : 90.3 fL  Mean Cell Hemoglobin : 28.3 pg  Mean Cell Hemoglobin Concentration : 31.4 %  Auto Neutrophil # : x  Auto Lymphocyte # : x  Auto Monocyte # : x  Auto Eosinophil # : x  Auto Basophil # : x  Auto Neutrophil % : x  Auto Lymphocyte % : x  Auto Monocyte % : x  Auto Eosinophil % : x  Auto Basophil % : x    07-03    137  |  93<L>  |  31<H>  ----------------------------<  118<H>  3.6   |  26  |  4.15<H>    Ca    9.3      03 Jul 2018 03:10  Mg     2.3     07-02      PT/INR - ( 02 Jul 2018 03:10 )   PT: 16.0 SEC;   INR: 1.43

## 2018-07-03 NOTE — PROVIDER CONTACT NOTE (OTHER) - ACTION/TREATMENT ORDERED:
Placed on bipap
vitals. Notified pa will continue to monitor
notified pa. Did ekg and trop blood levels. will continue to monitor.
Medicate with amidarone and metoprolol as per eMAR.
repeat CBC sent. cont to monitor
send trop stat

## 2018-07-04 LAB
BUN SERPL-MCNC: 53 MG/DL — HIGH (ref 7–23)
CALCIUM SERPL-MCNC: 9.4 MG/DL — SIGNIFICANT CHANGE UP (ref 8.4–10.5)
CHLORIDE SERPL-SCNC: 96 MMOL/L — LOW (ref 98–107)
CO2 SERPL-SCNC: 25 MMOL/L — SIGNIFICANT CHANGE UP (ref 22–31)
CREAT SERPL-MCNC: 5.76 MG/DL — HIGH (ref 0.5–1.3)
GLUCOSE BLDC GLUCOMTR-MCNC: 129 MG/DL — HIGH (ref 70–99)
GLUCOSE BLDC GLUCOMTR-MCNC: 140 MG/DL — HIGH (ref 70–99)
GLUCOSE BLDC GLUCOMTR-MCNC: 178 MG/DL — HIGH (ref 70–99)
GLUCOSE BLDC GLUCOMTR-MCNC: 192 MG/DL — HIGH (ref 70–99)
GLUCOSE SERPL-MCNC: 121 MG/DL — HIGH (ref 70–99)
HCT VFR BLD CALC: 29.7 % — LOW (ref 39–50)
HGB BLD-MCNC: 9 G/DL — LOW (ref 13–17)
INR BLD: 1.26 — HIGH (ref 0.88–1.17)
MCHC RBC-ENTMCNC: 28.8 PG — SIGNIFICANT CHANGE UP (ref 27–34)
MCHC RBC-ENTMCNC: 30.3 % — LOW (ref 32–36)
MCV RBC AUTO: 94.9 FL — SIGNIFICANT CHANGE UP (ref 80–100)
NRBC # FLD: 0 — SIGNIFICANT CHANGE UP
PLATELET # BLD AUTO: 254 K/UL — SIGNIFICANT CHANGE UP (ref 150–400)
PMV BLD: 10 FL — SIGNIFICANT CHANGE UP (ref 7–13)
POTASSIUM SERPL-MCNC: 3.6 MMOL/L — SIGNIFICANT CHANGE UP (ref 3.5–5.3)
POTASSIUM SERPL-SCNC: 3.6 MMOL/L — SIGNIFICANT CHANGE UP (ref 3.5–5.3)
PROTHROM AB SERPL-ACNC: 14 SEC — HIGH (ref 9.8–13.1)
RBC # BLD: 3.13 M/UL — LOW (ref 4.2–5.8)
RBC # FLD: 15.6 % — HIGH (ref 10.3–14.5)
SODIUM SERPL-SCNC: 140 MMOL/L — SIGNIFICANT CHANGE UP (ref 135–145)
WBC # BLD: 8.06 K/UL — SIGNIFICANT CHANGE UP (ref 3.8–10.5)
WBC # FLD AUTO: 8.06 K/UL — SIGNIFICANT CHANGE UP (ref 3.8–10.5)

## 2018-07-04 PROCEDURE — 99232 SBSQ HOSP IP/OBS MODERATE 35: CPT | Mod: GC

## 2018-07-04 RX ADMIN — Medication 25 MILLIGRAM(S): at 21:56

## 2018-07-04 RX ADMIN — TAMSULOSIN HYDROCHLORIDE 0.4 MILLIGRAM(S): 0.4 CAPSULE ORAL at 21:55

## 2018-07-04 RX ADMIN — Medication 216 GRAM(S): at 05:30

## 2018-07-04 RX ADMIN — CLOPIDOGREL BISULFATE 75 MILLIGRAM(S): 75 TABLET, FILM COATED ORAL at 09:20

## 2018-07-04 RX ADMIN — Medication 216 GRAM(S): at 21:55

## 2018-07-04 RX ADMIN — ATORVASTATIN CALCIUM 40 MILLIGRAM(S): 80 TABLET, FILM COATED ORAL at 21:56

## 2018-07-04 RX ADMIN — Medication 1: at 13:38

## 2018-07-04 RX ADMIN — Medication 1 DROP(S): at 05:31

## 2018-07-04 RX ADMIN — ERYTHROPOIETIN 4000 UNIT(S): 10000 INJECTION, SOLUTION INTRAVENOUS; SUBCUTANEOUS at 17:22

## 2018-07-04 RX ADMIN — Medication 81 MILLIGRAM(S): at 09:20

## 2018-07-04 RX ADMIN — Medication 1 TABLET(S): at 09:20

## 2018-07-04 RX ADMIN — Medication 1 DROP(S): at 21:56

## 2018-07-04 RX ADMIN — Medication 5 MILLIGRAM(S): at 21:56

## 2018-07-04 RX ADMIN — Medication 1 DROP(S): at 11:17

## 2018-07-04 NOTE — PROGRESS NOTE ADULT - SUBJECTIVE AND OBJECTIVE BOX
JIMI BUCHANAN:[id number],   72yMale followed for:  No Known Allergies    PAST MEDICAL & SURGICAL HISTORY:  CVA (cerebral vascular accident)  COPD (chronic obstructive pulmonary disease)  BPH (benign prostatic hyperplasia)  Bipolar affective disorder  CKD (chronic kidney disease)  Pancreatitis  Hypertension  Dyslipidemia  Diabetes mellitus  Coronary artery disease  HLD (hyperlipidemia)  Anemia  Acute renal failure  CKD (chronic kidney disease)  COPD (chronic obstructive pulmonary disease)  Fainting  Cardiac arrhythmia  Dizziness  Hydronephrosis  Cholecystitis  Bipolar 1 disorder  DM (diabetes mellitus)  HTN (hypertension)  Lumbar radiculopathy  Left heart failure  Reflux esophagitis  Coronary atherosclerosis  History of cardiac catheterization  No significant past surgical history    FAMILY HISTORY:  No pertinent family history in first degree relatives    MEDICATIONS  (STANDING):  amiodarone    Tablet 200 milliGRAM(s) Oral daily  ampicillin  IVPB 2 Gram(s) IV Intermittent every 12 hours  artificial  tears Solution 1 Drop(s) Both EYES four times a day  aspirin  chewable 81 milliGRAM(s) Oral daily  atorvastatin 40 milliGRAM(s) Oral at bedtime  busPIRone 5 milliGRAM(s) Oral two times a day  clopidogrel Tablet 75 milliGRAM(s) Oral daily  dextrose 50% Injectable 12.5 Gram(s) IV Push once  dextrose 50% Injectable 25 Gram(s) IV Push once  dextrose 50% Injectable 25 Gram(s) IV Push once  epoetin georgie Injectable 4000 Unit(s) IV Push <User Schedule>  insulin lispro (HumaLOG) corrective regimen sliding scale   SubCutaneous three times a day before meals  insulin lispro (HumaLOG) corrective regimen sliding scale   SubCutaneous at bedtime  metoprolol tartrate 25 milliGRAM(s) Oral two times a day  multivitamin 1 Tablet(s) Oral daily  pantoprazole    Tablet 40 milliGRAM(s) Oral before breakfast  tamsulosin 0.4 milliGRAM(s) Oral at bedtime    MEDICATIONS  (PRN):  acetaminophen  Suppository 650 milliGRAM(s) Rectal every 6 hours PRN For Temp greater than 38 C (100.4 F)  ALBUTerol/ipratropium for Nebulization 3 milliLiter(s) Nebulizer every 6 hours PRN Shortness of Breath  dextrose 40% Gel 15 Gram(s) Oral once PRN Blood Glucose LESS THAN 70 milliGRAM(s)/deciliter  glucagon  Injectable 1 milliGRAM(s) IntraMuscular once PRN Glucose LESS THAN 70 milligrams/deciliter      Vital Signs Last 24 Hrs  T(C): 36.7 (04 Jul 2018 05:26), Max: 36.7 (03 Jul 2018 12:25)  T(F): 98 (04 Jul 2018 05:26), Max: 98.1 (03 Jul 2018 19:18)  HR: 72 (04 Jul 2018 06:34) (70 - 79)  BP: 132/67 (04 Jul 2018 05:26) (106/56 - 132/67)  BP(mean): --  RR: 16 (04 Jul 2018 05:26) (16 - 17)  SpO2: 97% (04 Jul 2018 06:34) (95% - 100%)  nc/at  s1s2  cta  soft, nt, nd no guarding or rebound  no c/c/e    CBC Full  -  ( 03 Jul 2018 16:47 )  WBC Count : 6.65 K/uL  Hemoglobin : 8.5 g/dL  Hematocrit : 27.0 %  Platelet Count - Automated : 222 K/uL  Mean Cell Volume : 90.0 fL  Mean Cell Hemoglobin : 28.3 pg  Mean Cell Hemoglobin Concentration : 31.5 %  Auto Neutrophil # : x  Auto Lymphocyte # : x  Auto Monocyte # : x  Auto Eosinophil # : x  Auto Basophil # : x  Auto Neutrophil % : x  Auto Lymphocyte % : x  Auto Monocyte % : x  Auto Eosinophil % : x  Auto Basophil % : x    07-03    137  |  93<L>  |  31<H>  ----------------------------<  118<H>  3.6   |  26  |  4.15<H>    Ca    9.3      03 Jul 2018 03:10

## 2018-07-04 NOTE — PROGRESS NOTE ADULT - SUBJECTIVE AND OBJECTIVE BOX
Reid Martinez MD  Interventional Cardiology / Advance Heart Failure and Cardiac Transplant Specialist  [place] Office : [address] [place] [address]  Tel:   [place] Office : [address] [facility] [place] [address]  Tel: [phone]  Cell : [phone]    Subjective : Pt lying in bed comfortable, not in distress, denies any chest pain or SOB  	  MEDICATIONS:  amiodarone    Tablet 200 milliGRAM(s) Oral daily  aspirin  chewable 81 milliGRAM(s) Oral daily  clopidogrel Tablet 75 milliGRAM(s) Oral daily  metoprolol tartrate 25 milliGRAM(s) Oral two times a day  tamsulosin 0.4 milliGRAM(s) Oral at bedtime    ampicillin  IVPB 2 Gram(s) IV Intermittent every 12 hours    ALBUTerol/ipratropium for Nebulization 3 milliLiter(s) Nebulizer every 6 hours PRN    acetaminophen  Suppository 650 milliGRAM(s) Rectal every 6 hours PRN  busPIRone 5 milliGRAM(s) Oral two times a day    pantoprazole    Tablet 40 milliGRAM(s) Oral before breakfast    atorvastatin 40 milliGRAM(s) Oral at bedtime  dextrose 40% Gel 15 Gram(s) Oral once PRN  dextrose 50% Injectable 12.5 Gram(s) IV Push once  dextrose 50% Injectable 25 Gram(s) IV Push once  dextrose 50% Injectable 25 Gram(s) IV Push once  glucagon  Injectable 1 milliGRAM(s) IntraMuscular once PRN  insulin lispro (HumaLOG) corrective regimen sliding scale   SubCutaneous three times a day before meals  insulin lispro (HumaLOG) corrective regimen sliding scale   SubCutaneous at bedtime    artificial  tears Solution 1 Drop(s) Both EYES four times a day  epoetin georgie Injectable 4000 Unit(s) IV Push <User Schedule>  multivitamin 1 Tablet(s) Oral daily      PHYSICAL EXAM:  T(C): 36.7 (07-04-18 @ 05:26), Max: 36.7 (07-03-18 @ 12:25)  HR: 72 (07-04-18 @ 06:34) (70 - 79)  BP: 132/67 (07-04-18 @ 05:26) (106/56 - 132/67)  RR: 16 (07-04-18 @ 05:26) (16 - 17)  SpO2: 97% (07-04-18 @ 06:34) (95% - 100%)  Wt(kg): --  I&O's Summary    04 Jul 2018 07:01  -  04 Jul 2018 11:24  --------------------------------------------------------  IN: 240 mL / OUT: 0 mL / NET: 240 mL          Appearance: Normal	  HEENT:   Normal oral mucosa, PERRL, EOMI	  Cardiovascular: Normal S1 S2, No JVD, No murmurs, No edema  Respiratory: Lungs clear to auscultation	  Gastrointestinal:  Soft, Non-tender, + BS	  Extremities: Normal range of motion, No clubbing, cyanosis or edema                                    9.0    8.06  )-----------( 254      ( 04 Jul 2018 05:55 )             29.7     07-04    140  |  96<L>  |  53<H>  ----------------------------<  121<H>  3.6   |  25  |  5.76<H>    Ca    9.4      04 Jul 2018 05:55      proBNP:   Lipid Profile:   HgA1c:   TSH:

## 2018-07-04 NOTE — PROGRESS NOTE ADULT - SUBJECTIVE AND OBJECTIVE BOX
[facility] DIVISION OF KIDNEY DISEASES AND HYPERTENSION -- FOLLOW UP NOTE  --------------------------------------------------------------------------------    HPI: 73 y/o M with CAD s/p 12-14 stents placed in 1990's, and BABAR x 5 placed in Sept 2017, CHF with EF 20%, DM, HTN, HLD, AF on coumadin, CVA , ESRD on HD (MWF) COPD, bipolar disorder, dementia admitted for SOB. Last HD session was on 7/2/18. Pt planned for HD today. Patient seen and examined at bedside.    PAST HISTORY  --------------------------------------------------------------------------------  No significant changes to PMH, PSH, FHx, SHx, unless otherwise noted    ALLERGIES & MEDICATIONS  --------------------------------------------------------------------------------  Allergies    No Known Allergies    Intolerances      Standing Inpatient Medications  amiodarone    Tablet 200 milliGRAM(s) Oral daily  ampicillin  IVPB 2 Gram(s) IV Intermittent every 12 hours  artificial  tears Solution 1 Drop(s) Both EYES four times a day  aspirin  chewable 81 milliGRAM(s) Oral daily  atorvastatin 40 milliGRAM(s) Oral at bedtime  busPIRone 5 milliGRAM(s) Oral two times a day  clopidogrel Tablet 75 milliGRAM(s) Oral daily  dextrose 50% Injectable 12.5 Gram(s) IV Push once  dextrose 50% Injectable 25 Gram(s) IV Push once  dextrose 50% Injectable 25 Gram(s) IV Push once  epoetin georgie Injectable 4000 Unit(s) IV Push <User Schedule>  insulin lispro (HumaLOG) corrective regimen sliding scale   SubCutaneous three times a day before meals  insulin lispro (HumaLOG) corrective regimen sliding scale   SubCutaneous at bedtime  metoprolol tartrate 25 milliGRAM(s) Oral two times a day  multivitamin 1 Tablet(s) Oral daily  pantoprazole    Tablet 40 milliGRAM(s) Oral before breakfast  tamsulosin 0.4 milliGRAM(s) Oral at bedtime    PRN Inpatient Medications  acetaminophen  Suppository 650 milliGRAM(s) Rectal every 6 hours PRN  ALBUTerol/ipratropium for Nebulization 3 milliLiter(s) Nebulizer every 6 hours PRN  dextrose 40% Gel 15 Gram(s) Oral once PRN  glucagon  Injectable 1 milliGRAM(s) IntraMuscular once PRN      REVIEW OF SYSTEMS  --------------------------------------------------------------------------------  Gen: + weakness  Skin: No rashes  Head/Eyes/Ears/Mouth: No headache  Respiratory: No dyspnea, cough  CV: No chest pain, PND, orthopnea  GI: No abdominal pain  MSK: No edema    All other systems were reviewed and are negative, except as noted.    VITALS/PHYSICAL EXAM  --------------------------------------------------------------------------------  T(C): 36.7 (07-04-18 @ 05:26), Max: 36.7 (07-03-18 @ 12:25)  HR: 72 (07-04-18 @ 06:34) (70 - 79)  BP: 132/67 (07-04-18 @ 05:26) (106/56 - 132/67)  RR: 16 (07-04-18 @ 05:26) (16 - 17)  SpO2: 97% (07-04-18 @ 06:34) (95% - 100%)  Wt(kg): --    Physical Exam:  	Gen: Elderly male, NAD  	HEENT: anicteric  	Pulm: rales on exam  	CV:  S1S2 rrr  	Abd: +BS, soft   	Ext: No B/L Lower ext edema  	Neuro: minimally verbal, follows commands  	Skin: Warm, without rashes  	Vascular access: Right UE AVF present; thrill and bruit heard.     LABS/STUDIES  --------------------------------------------------------------------------------              9.0    8.06  >-----------<  254      [07-04-18 @ 05:55]              29.7     140  |  96  |  53  ----------------------------<  121      [07-04-18 @ 05:55]  3.6   |  25  |  5.76        Ca     9.4     [07-04-18 @ 05:55]      PT/INR: PT 14.0 , INR 1.26       [07-04-18 @ 05:55]      Creatinine Trend:  SCr 5.76 [07-04 @ 05:55]  SCr 4.15 [07-03 @ 03:10]  SCr 6.30 [07-02 @ 03:10]  SCr 5.24 [07-01 @ 04:58]  SCr 3.84 [06-30 @ 06:10]    Iron 41, TIBC 157, %sat --      [06-20-18 @ 06:00]  Ferritin 2578      [06-20-18 @ 06:00]  PTH 76.54 (Ca --)      [06-20-18 @ 06:00]   --  PTH -- (Ca 8.5)      [09-08-17 @ 09:29]   156  HbA1c 6.6      [06-20-18 @ 06:00]  TSH 5.93      [10-21-17 @ 09:04]  Lipid: chol 105, , HDL 35, LDL 35      [09-27-17 @ 07:40]    HBsAb <3.0      [06-19-18 @ 17:05]  HBsAg NEGATIVE      [06-19-18 @ 17:05]  HBcAb Nonreactive      [06-19-18 @ 17:05]  HCV 0.35, Nonreactive Hepatitis C AB  S/CO Ratio                        Interpretation  < 1.0                                     Non-Reactive  1.0 - 4.9                           Weakly-Reactive  > 5.0                                 Reactive  Non-Reactive: Aperson with a non-reactive HCV antibody  result is considered uninfected.  No further action is  needed unless recent infection is suspected.  In these  cases, consider repeat testing later to detect  seroconversion..  Weakly-Reactive: HCV antibody test is abnormal, HCV RNA  Qualitative test will follow.  Reactive: HCV antibody test is abnormal, HCV RNA  Qualitative test will follow.  Note: HCV antibody testing is performed on the Abbott   system.      [06-19-18 @ 17:05] [facility] DIVISION OF KIDNEY DISEASES AND HYPERTENSION -- FOLLOW UP NOTE  --------------------------------------------------------------------------------  HPI: 71 y/o M with CAD s/p 12-14 stents placed in 1990's, and BABAR x 5 placed in Sept 2017, CHF with EF 20%, DM, HTN, HLD, AF on coumadin, CVA , ESRD on HD (MWF) COPD, bipolar disorder, dementia admitted for SOB. Last HD session was on 7/2/18. Pt planned for HD today. Patient seen and examined at bedside.    PAST HISTORY  --------------------------------------------------------------------------------  No significant changes to PMH, PSH, FHx, SHx, unless otherwise noted    ALLERGIES & MEDICATIONS  --------------------------------------------------------------------------------  Allergies    No Known Allergies    Intolerances      Standing Inpatient Medications  amiodarone    Tablet 200 milliGRAM(s) Oral daily  ampicillin  IVPB 2 Gram(s) IV Intermittent every 12 hours  artificial  tears Solution 1 Drop(s) Both EYES four times a day  aspirin  chewable 81 milliGRAM(s) Oral daily  atorvastatin 40 milliGRAM(s) Oral at bedtime  busPIRone 5 milliGRAM(s) Oral two times a day  clopidogrel Tablet 75 milliGRAM(s) Oral daily  dextrose 50% Injectable 12.5 Gram(s) IV Push once  dextrose 50% Injectable 25 Gram(s) IV Push once  dextrose 50% Injectable 25 Gram(s) IV Push once  epoetin georgie Injectable 4000 Unit(s) IV Push <User Schedule>  insulin lispro (HumaLOG) corrective regimen sliding scale   SubCutaneous three times a day before meals  insulin lispro (HumaLOG) corrective regimen sliding scale   SubCutaneous at bedtime  metoprolol tartrate 25 milliGRAM(s) Oral two times a day  multivitamin 1 Tablet(s) Oral daily  pantoprazole    Tablet 40 milliGRAM(s) Oral before breakfast  tamsulosin 0.4 milliGRAM(s) Oral at bedtime    PRN Inpatient Medications  acetaminophen  Suppository 650 milliGRAM(s) Rectal every 6 hours PRN  ALBUTerol/ipratropium for Nebulization 3 milliLiter(s) Nebulizer every 6 hours PRN  dextrose 40% Gel 15 Gram(s) Oral once PRN  glucagon  Injectable 1 milliGRAM(s) IntraMuscular once PRN      REVIEW OF SYSTEMS  --------------------------------------------------------------------------------  Gen: + weakness  Skin: No rashes  Head/Eyes/Ears/Mouth: No headache  Respiratory: No dyspnea, cough  CV: No chest pain, PND, orthopnea  GI: No abdominal pain  MSK: No edema    All other systems were reviewed and are negative, except as noted.    VITALS/PHYSICAL EXAM  --------------------------------------------------------------------------------  T(C): 36.7 (07-04-18 @ 05:26), Max: 36.7 (07-03-18 @ 12:25)  HR: 72 (07-04-18 @ 06:34) (70 - 79)  BP: 132/67 (07-04-18 @ 05:26) (106/56 - 132/67)  RR: 16 (07-04-18 @ 05:26) (16 - 17)  SpO2: 97% (07-04-18 @ 06:34) (95% - 100%)  Wt(kg): --    Physical Exam:  	Gen: Elderly male, NAD  	HEENT: anicteric  	Pulm: rales on exam  	CV:  S1S2 rrr  	Abd: +BS, soft   	Ext: No B/L Lower ext edema  	Neuro: minimally verbal, follows commands  	Skin: Warm, without rashes  	Vascular access: Right UE AVF present; thrill and bruit heard.     LABS/STUDIES  --------------------------------------------------------------------------------              9.0    8.06  >-----------<  254      [07-04-18 @ 05:55]              29.7     140  |  96  |  53  ----------------------------<  121      [07-04-18 @ 05:55]  3.6   |  25  |  5.76        Ca     9.4     [07-04-18 @ 05:55]      PT/INR: PT 14.0 , INR 1.26       [07-04-18 @ 05:55]      Creatinine Trend:  SCr 5.76 [07-04 @ 05:55]  SCr 4.15 [07-03 @ 03:10]  SCr 6.30 [07-02 @ 03:10]  SCr 5.24 [07-01 @ 04:58]  SCr 3.84 [06-30 @ 06:10]    Iron 41, TIBC 157, %sat --      [06-20-18 @ 06:00]  Ferritin 2578      [06-20-18 @ 06:00]  PTH 76.54 (Ca --)      [06-20-18 @ 06:00]   --  PTH -- (Ca 8.5)      [09-08-17 @ 09:29]   156  HbA1c 6.6      [06-20-18 @ 06:00]  TSH 5.93      [10-21-17 @ 09:04]  Lipid: chol 105, , HDL 35, LDL 35      [09-27-17 @ 07:40]    HBsAb <3.0      [06-19-18 @ 17:05]  HBsAg NEGATIVE      [06-19-18 @ 17:05]  HBcAb Nonreactive      [06-19-18 @ 17:05]  HCV 0.35, Nonreactive Hepatitis C AB  S/CO Ratio                        Interpretation  < 1.0                                     Non-Reactive  1.0 - 4.9                           Weakly-Reactive  > 5.0                                 Reactive  Non-Reactive: Aperson with a non-reactive HCV antibody  result is considered uninfected.  No further action is  needed unless recent infection is suspected.  In these  cases, consider repeat testing later to detect  seroconversion..  Weakly-Reactive: HCV antibody test is abnormal, HCV RNA  Qualitative test will follow.  Reactive: HCV antibody test is abnormal, HCV RNA  Qualitative test will follow.  Note: HCV antibody testing is performed on the Abbott   system.      [06-19-18 @ 17:05]

## 2018-07-04 NOTE — PROGRESS NOTE ADULT - ASSESSMENT
h/h stable. ok with current regimen.  will need workup in [place]. none now secondary to recent cardiac issues.

## 2018-07-04 NOTE — PROGRESS NOTE ADULT - SUBJECTIVE AND OBJECTIVE BOX
chief complaint : dyspnea        SUBJECTIVE / OVERNIGHT EVENTS: pt appears comfortable, denies chest pain, shortness of breath , nausea, vomiting,     MEDICATIONS  (STANDING):  amiodarone    Tablet 200 milliGRAM(s) Oral daily  ampicillin  IVPB 2 Gram(s) IV Intermittent every 12 hours  artificial  tears Solution 1 Drop(s) Both EYES four times a day  aspirin  chewable 81 milliGRAM(s) Oral daily  atorvastatin 40 milliGRAM(s) Oral at bedtime  busPIRone 5 milliGRAM(s) Oral two times a day  clopidogrel Tablet 75 milliGRAM(s) Oral daily  dextrose 50% Injectable 12.5 Gram(s) IV Push once  dextrose 50% Injectable 25 Gram(s) IV Push once  dextrose 50% Injectable 25 Gram(s) IV Push once  epoetin georgie Injectable 4000 Unit(s) IV Push <User Schedule>  insulin lispro (HumaLOG) corrective regimen sliding scale   SubCutaneous three times a day before meals  insulin lispro (HumaLOG) corrective regimen sliding scale   SubCutaneous at bedtime  metoprolol tartrate 25 milliGRAM(s) Oral two times a day  multivitamin 1 Tablet(s) Oral daily  pantoprazole    Tablet 40 milliGRAM(s) Oral before breakfast  tamsulosin 0.4 milliGRAM(s) Oral at bedtime    MEDICATIONS  (PRN):  acetaminophen  Suppository 650 milliGRAM(s) Rectal every 6 hours PRN For Temp greater than 38 C (100.4 F)  ALBUTerol/ipratropium for Nebulization 3 milliLiter(s) Nebulizer every 6 hours PRN Shortness of Breath  dextrose 40% Gel 15 Gram(s) Oral once PRN Blood Glucose LESS THAN 70 milliGRAM(s)/deciliter  glucagon  Injectable 1 milliGRAM(s) IntraMuscular once PRN Glucose LESS THAN 70 milligrams/deciliter      Vital Signs Last 24 Hrs  T(C): 36.3 (04 Jul 2018 19:40), Max: 36.9 (04 Jul 2018 16:20)  T(F): 97.3 (04 Jul 2018 19:40), Max: 98.4 (04 Jul 2018 16:20)  HR: 85 (04 Jul 2018 19:40) (70 - 85)  BP: 141/69 (04 Jul 2018 19:40) (116/60 - 141/69)  BP(mean): --  RR: 19 (04 Jul 2018 19:40) (16 - 20)  SpO2: 98% (04 Jul 2018 15:14) (95% - 100%)    Constitutional: No fever, fatigue  Skin: No rash.  Eyes: No recent vision problems or eye pain.  ENT: No congestion, ear pain, or sore throat.  Cardiovascular: No chest pain or palpation.  Respiratory: No cough, shortness of breath, congestion, or wheezing.  Gastrointestinal: No abdominal pain, nausea, vomiting, or diarrhea.  Genitourinary: No dysuria.  Musculoskeletal: No joint swelling.  Neurologic: No headache.    PHYSICAL EXAM:  GENERAL: NAD  EYES: EOMI, PERRLA  NECK: Supple, No JVD  CHEST/LUNG: dec breath sounds at bases,  HEART:  S1 , S2 +  ABDOMEN: soft bs+  EXTREMITIES:  trace edema+  NEUROLOGY:alert awake     LABS:  07-04    140  |  96<L>  |  53<H>  ----------------------------<  121<H>  3.6   |  25  |  5.76<H>    Ca    9.4      04 Jul 2018 05:55      Creatinine Trend: 5.76 <--, 4.15 <--, 6.30 <--, 5.24 <--, 3.84 <--, 4.85 <--, 3.48 <--                        9.0    8.06  )-----------( 254      ( 04 Jul 2018 05:55 )             29.7     Urine Studies:              PT/INR - ( 04 Jul 2018 05:55 )   PT: 14.0 SEC;   INR: 1.26

## 2018-07-05 ENCOUNTER — TRANSCRIPTION ENCOUNTER (OUTPATIENT)
Age: 73
End: 2018-07-05

## 2018-07-05 LAB
APTT BLD: 34.1 SEC — SIGNIFICANT CHANGE UP (ref 27.5–37.4)
BUN SERPL-MCNC: 28 MG/DL — HIGH (ref 7–23)
CALCIUM SERPL-MCNC: 9.4 MG/DL — SIGNIFICANT CHANGE UP (ref 8.4–10.5)
CHLORIDE SERPL-SCNC: 97 MMOL/L — LOW (ref 98–107)
CO2 SERPL-SCNC: 27 MMOL/L — SIGNIFICANT CHANGE UP (ref 22–31)
CREAT SERPL-MCNC: 3.69 MG/DL — HIGH (ref 0.5–1.3)
GLUCOSE BLDC GLUCOMTR-MCNC: 115 MG/DL — HIGH (ref 70–99)
GLUCOSE BLDC GLUCOMTR-MCNC: 119 MG/DL — HIGH (ref 70–99)
GLUCOSE BLDC GLUCOMTR-MCNC: 165 MG/DL — HIGH (ref 70–99)
GLUCOSE BLDC GLUCOMTR-MCNC: 181 MG/DL — HIGH (ref 70–99)
GLUCOSE SERPL-MCNC: 119 MG/DL — HIGH (ref 70–99)
HCT VFR BLD CALC: 26.6 % — LOW (ref 39–50)
HGB BLD-MCNC: 8.3 G/DL — LOW (ref 13–17)
INR BLD: 1.14 — SIGNIFICANT CHANGE UP (ref 0.88–1.17)
MCHC RBC-ENTMCNC: 28.4 PG — SIGNIFICANT CHANGE UP (ref 27–34)
MCHC RBC-ENTMCNC: 31.2 % — LOW (ref 32–36)
MCV RBC AUTO: 91.1 FL — SIGNIFICANT CHANGE UP (ref 80–100)
NRBC # FLD: 0 — SIGNIFICANT CHANGE UP
PLATELET # BLD AUTO: 249 K/UL — SIGNIFICANT CHANGE UP (ref 150–400)
PMV BLD: 9.6 FL — SIGNIFICANT CHANGE UP (ref 7–13)
POTASSIUM SERPL-MCNC: 3.8 MMOL/L — SIGNIFICANT CHANGE UP (ref 3.5–5.3)
POTASSIUM SERPL-SCNC: 3.8 MMOL/L — SIGNIFICANT CHANGE UP (ref 3.5–5.3)
PROTHROM AB SERPL-ACNC: 13.2 SEC — HIGH (ref 9.8–13.1)
RBC # BLD: 2.92 M/UL — LOW (ref 4.2–5.8)
RBC # FLD: 15.6 % — HIGH (ref 10.3–14.5)
SODIUM SERPL-SCNC: 140 MMOL/L — SIGNIFICANT CHANGE UP (ref 135–145)
WBC # BLD: 6.32 K/UL — SIGNIFICANT CHANGE UP (ref 3.8–10.5)
WBC # FLD AUTO: 6.32 K/UL — SIGNIFICANT CHANGE UP (ref 3.8–10.5)

## 2018-07-05 RX ORDER — ASPIRIN/CALCIUM CARB/MAGNESIUM 324 MG
1 TABLET ORAL
Qty: 0 | Refills: 0 | COMMUNITY
Start: 2018-07-05

## 2018-07-05 RX ORDER — PANTOPRAZOLE SODIUM 20 MG/1
1 TABLET, DELAYED RELEASE ORAL
Qty: 0 | Refills: 0 | COMMUNITY
Start: 2018-07-05

## 2018-07-05 RX ORDER — ACETAMINOPHEN 500 MG
1 TABLET ORAL
Qty: 0 | Refills: 0 | COMMUNITY
Start: 2018-07-05

## 2018-07-05 RX ORDER — ATORVASTATIN CALCIUM 80 MG/1
1 TABLET, FILM COATED ORAL
Qty: 0 | Refills: 0 | COMMUNITY
Start: 2018-07-05

## 2018-07-05 RX ORDER — TAMSULOSIN HYDROCHLORIDE 0.4 MG/1
1 CAPSULE ORAL
Qty: 0 | Refills: 0 | COMMUNITY
Start: 2018-07-05

## 2018-07-05 RX ORDER — FLUTICASONE FUROATE AND VILANTEROL TRIFENATATE 100; 25 UG/1; UG/1
1 POWDER RESPIRATORY (INHALATION)
Qty: 0 | Refills: 0 | COMMUNITY

## 2018-07-05 RX ORDER — AMIODARONE HYDROCHLORIDE 400 MG/1
1 TABLET ORAL
Qty: 0 | Refills: 0 | COMMUNITY
Start: 2018-07-05

## 2018-07-05 RX ORDER — ACETAMINOPHEN 500 MG
2 TABLET ORAL
Qty: 0 | Refills: 0 | COMMUNITY
Start: 2018-07-05

## 2018-07-05 RX ORDER — WARFARIN SODIUM 2.5 MG/1
1 TABLET ORAL
Qty: 0 | Refills: 0 | COMMUNITY

## 2018-07-05 RX ORDER — CHOLECALCIFEROL (VITAMIN D3) 125 MCG
1 CAPSULE ORAL
Qty: 0 | Refills: 0 | COMMUNITY

## 2018-07-05 RX ORDER — METOPROLOL TARTRATE 50 MG
1 TABLET ORAL
Qty: 0 | Refills: 0 | COMMUNITY
Start: 2018-07-05

## 2018-07-05 RX ORDER — IPRATROPIUM/ALBUTEROL SULFATE 18-103MCG
3 AEROSOL WITH ADAPTER (GRAM) INHALATION
Qty: 0 | Refills: 0 | COMMUNITY
Start: 2018-07-05

## 2018-07-05 RX ORDER — ACETAMINOPHEN 500 MG
650 TABLET ORAL EVERY 6 HOURS
Qty: 0 | Refills: 0 | Status: DISCONTINUED | OUTPATIENT
Start: 2018-07-05 | End: 2018-07-06

## 2018-07-05 RX ORDER — CLOPIDOGREL BISULFATE 75 MG/1
1 TABLET, FILM COATED ORAL
Qty: 0 | Refills: 0 | COMMUNITY
Start: 2018-07-05

## 2018-07-05 RX ADMIN — Medication 81 MILLIGRAM(S): at 10:45

## 2018-07-05 RX ADMIN — Medication 1 DROP(S): at 17:10

## 2018-07-05 RX ADMIN — Medication 1 DROP(S): at 05:11

## 2018-07-05 RX ADMIN — Medication 1 DROP(S): at 10:47

## 2018-07-05 RX ADMIN — Medication 1: at 13:31

## 2018-07-05 RX ADMIN — AMIODARONE HYDROCHLORIDE 200 MILLIGRAM(S): 400 TABLET ORAL at 05:11

## 2018-07-05 RX ADMIN — ATORVASTATIN CALCIUM 40 MILLIGRAM(S): 80 TABLET, FILM COATED ORAL at 22:45

## 2018-07-05 RX ADMIN — CLOPIDOGREL BISULFATE 75 MILLIGRAM(S): 75 TABLET, FILM COATED ORAL at 10:45

## 2018-07-05 RX ADMIN — Medication 5 MILLIGRAM(S): at 05:11

## 2018-07-05 RX ADMIN — Medication 650 MILLIGRAM(S): at 11:45

## 2018-07-05 RX ADMIN — Medication 5 MILLIGRAM(S): at 17:10

## 2018-07-05 RX ADMIN — Medication 650 MILLIGRAM(S): at 10:45

## 2018-07-05 RX ADMIN — Medication 216 GRAM(S): at 05:11

## 2018-07-05 RX ADMIN — TAMSULOSIN HYDROCHLORIDE 0.4 MILLIGRAM(S): 0.4 CAPSULE ORAL at 22:45

## 2018-07-05 RX ADMIN — Medication 25 MILLIGRAM(S): at 17:10

## 2018-07-05 RX ADMIN — Medication 1 TABLET(S): at 10:45

## 2018-07-05 RX ADMIN — Medication 1: at 18:15

## 2018-07-05 RX ADMIN — PANTOPRAZOLE SODIUM 40 MILLIGRAM(S): 20 TABLET, DELAYED RELEASE ORAL at 05:11

## 2018-07-05 RX ADMIN — Medication 25 MILLIGRAM(S): at 05:11

## 2018-07-05 NOTE — PROGRESS NOTE ADULT - SUBJECTIVE AND OBJECTIVE BOX
chief complaint : dyspnea        SUBJECTIVE / OVERNIGHT EVENTS: pt appears comfortable, denies chest pain, shortness of breath , nausea, vomiting,     MEDICATIONS  (STANDING):  amiodarone    Tablet 200 milliGRAM(s) Oral daily  artificial  tears Solution 1 Drop(s) Both EYES four times a day  aspirin  chewable 81 milliGRAM(s) Oral daily  atorvastatin 40 milliGRAM(s) Oral at bedtime  busPIRone 5 milliGRAM(s) Oral two times a day  clopidogrel Tablet 75 milliGRAM(s) Oral daily  dextrose 50% Injectable 12.5 Gram(s) IV Push once  dextrose 50% Injectable 25 Gram(s) IV Push once  dextrose 50% Injectable 25 Gram(s) IV Push once  epoetin georgie Injectable 4000 Unit(s) IV Push <User Schedule>  insulin lispro (HumaLOG) corrective regimen sliding scale   SubCutaneous three times a day before meals  insulin lispro (HumaLOG) corrective regimen sliding scale   SubCutaneous at bedtime  metoprolol tartrate 25 milliGRAM(s) Oral two times a day  multivitamin 1 Tablet(s) Oral daily  pantoprazole    Tablet 40 milliGRAM(s) Oral before breakfast  tamsulosin 0.4 milliGRAM(s) Oral at bedtime    MEDICATIONS  (PRN):  acetaminophen   Tablet. 650 milliGRAM(s) Oral every 6 hours PRN Mild Pain (1 - 3)  acetaminophen  Suppository 650 milliGRAM(s) Rectal every 6 hours PRN For Temp greater than 38 C (100.4 F)  ALBUTerol/ipratropium for Nebulization 3 milliLiter(s) Nebulizer every 6 hours PRN Shortness of Breath  dextrose 40% Gel 15 Gram(s) Oral once PRN Blood Glucose LESS THAN 70 milliGRAM(s)/deciliter  glucagon  Injectable 1 milliGRAM(s) IntraMuscular once PRN Glucose LESS THAN 70 milligrams/deciliter    Vital Signs Last 24 Hrs  T(C): 36.8 (05 Jul 2018 14:30), Max: 37.1 (04 Jul 2018 21:54)  T(F): 98.2 (05 Jul 2018 14:30), Max: 98.8 (04 Jul 2018 21:54)  HR: 74 (05 Jul 2018 19:37) (64 - 77)  BP: 121/62 (05 Jul 2018 17:09) (116/64 - 134/70)  BP(mean): --  RR: 18 (05 Jul 2018 14:30) (18 - 18)  SpO2: 97% (05 Jul 2018 19:37) (97% - 100%)      Constitutional: No fever, fatigue  Skin: No rash.  Eyes: No recent vision problems or eye pain.  ENT: No congestion, ear pain, or sore throat.  Cardiovascular: No chest pain or palpation.  Respiratory: No cough, shortness of breath, congestion, or wheezing.  Gastrointestinal: No abdominal pain, nausea, vomiting, or diarrhea.  Genitourinary: No dysuria.  Musculoskeletal: No joint swelling.  Neurologic: No headache.    PHYSICAL EXAM:  GENERAL: NAD  EYES: EOMI, PERRLA  NECK: Supple, No JVD  CHEST/LUNG: dec breath sounds at bases,  HEART:  S1 , S2 +  ABDOMEN: soft bs+  EXTREMITIES:  trace edema+  NEUROLOGY:alert awake     LABS:  07-05    140  |  97<L>  |  28<H>  ----------------------------<  119<H>  3.8   |  27  |  3.69<H>    Ca    9.4      05 Jul 2018 07:25      Creatinine Trend: 3.69 <--, 5.76 <--, 4.15 <--, 6.30 <--, 5.24 <--, 3.84 <--, 4.85 <--                        8.3    6.32  )-----------( 249      ( 05 Jul 2018 07:25 )             26.6     Urine Studies:              PT/INR - ( 05 Jul 2018 07:25 )   PT: 13.2 SEC;   INR: 1.14          PTT - ( 05 Jul 2018 07:25 )  PTT:34.1 SEC

## 2018-07-05 NOTE — DISCHARGE NOTE ADULT - HOSPITAL COURSE
73 y/o male with a PMHx of CAD S/P stent placement x 12-14 stents placed in 1990's and BABAR x 5 placed in Sept 2017, ischemic cardiomyopathy with severe LV dysfunction, atrial fibrillation on Coumadin, ESRD on HD M/W/F with chronic james, CVA with right facial droop, COPD, HTN, HLD, DM, bipolar disorder and dementia was transferred from [facility] for respiratory distress. In the ED, pt was noted to have pulmonary edema on CXR S/P Lasix 80mg with improvement.    + Acute on chronic systolic heart failure- cards (Dr. Patterson) and renal (house) following, S/P BIPAP in ED, S/P urgent HD with extra sessions of HD, S/P IV Lasix, now on PO  + Enterococcus faecalis bacteremia- on Ampicillin IV until 07/04   + History of atrial fibrillation with subtherapeutic INR- s/p Heparin gtt, no AC given bleeding risk and anemia   + Elevated troponin- cards following (Dr. Martinez), likely in the setting of ESRD/CHF  + procitis     6/29 SOB and diaphoretic with O2 sat 89% resolved once placed on BiPAP  6/30 mild oozing from AVF now resolved   7/2 HD s/p 1 unit of PRBCs   07/03 4 beats of NSVT  EKG: NSR at 77 bpm with LAD  CE x2: Trop 237-->258  WBC: 14.29  H/H: 8.0/25.7  Na: 131  BUN/Cr: 85/7.78  Glucose: 388    6/19 CXR: Mild pulmonary edema.    6/20 CXR: Mild interstitial edema, unchanged.    6/21 CXR: Pulmonary edema with interval progression.    6/22 CT abdomen/pelvis: Atrophic kidneys with cortical scarring and heterogeneous right renal enhancement, question pyelonephritis. Correlate with urinalysis/culture. Circumferential mild rectal wall thickening. Correlate for signs of proctitis. Bladder stone. Bilateral pleural effusions.    6/24 CXR: No significant interval change in findings likely due to interstitial and alveolar pulmonary edema. Superimposed pneumonia, particularly in the right lower lung is not excluded.  6/24 Echo: Mild-moderate mitral regurgitation. Calcified trileaflet aortic valve with normal opening. Moderate global left ventricular systolic dysfunction. Endocardial visualization enhanced with intravenous injection of echo contrast (Definity). Normal right ventricular size and function. No previous Echo exam.  Repeat Blood cultures from 6/23 negative x 48 hours  7/2 Cine: Continue with puree and honey thick liquids  7/02 LUE duplex - No evidence of deep vein thrombosis in the left upper  extremity.

## 2018-07-05 NOTE — DISCHARGE NOTE ADULT - PLAN OF CARE
Resolution of symptoms Follow up with your PMD within 1 week  Call for appointment Follow with PMD. Monitor your weight and record results  Take medications as prescribed Follow with PMD

## 2018-07-05 NOTE — DISCHARGE NOTE ADULT - MEDICATION SUMMARY - MEDICATIONS TO TAKE
I will START or STAY ON the medications listed below when I get home from the hospital:    aspirin 81 mg oral tablet, chewable  -- 1 tab(s) by mouth once a day  -- Indication: For Acute on chronic systolic (congestive) heart failure    acetaminophen 325 mg oral tablet  -- 2 tab(s) by mouth every 6 hours, As needed, Mild Pain (1 - 3)  -- Indication: For pain    tamsulosin 0.4 mg oral capsule  -- 1 cap(s) by mouth once a day (at bedtime)  -- Indication: For prophylactic measure    amiodarone 200 mg oral tablet  -- 1 tab(s) by mouth once a day  -- Indication: For Atrial fibrillation, unspecified type    atorvastatin 40 mg oral tablet  -- 1 tab(s) by mouth once a day (at bedtime)  -- Indication: For Hyperlipidemia    clopidogrel 75 mg oral tablet  -- 1 tab(s) by mouth once a day  -- Indication: For Cardiac    busPIRone 5 mg oral tablet  -- 1 tab(s) by mouth 2 times a day  -- Indication: For prophylactic    metoprolol tartrate 25 mg oral tablet  -- 1 tab(s) by mouth 2 times a day  -- Indication: For Acute on chronic systolic (congestive) heart failure    ipratropium-albuterol 0.5 mg-2.5 mg/3 mLinhalation solution  -- 3 milliliter(s) inhaled every 6 hours, As needed, Shortness of Breath  -- Indication: For Acute on chronic systolic (congestive) heart failure    ocular lubricant ophthalmic solution  -- 1 drop(s) to each affected eye 4 times a day  -- Indication: For Eye lubricant    pantoprazole 40 mg oral delayed release tablet  -- 1 tab(s) by mouth once a day (before a meal)  -- Indication: For GERD    Multiple Vitamins oral tablet  -- 1 tab(s) by mouth once a day  -- Indication: For supplement    Vitamin D3 50,000 intl units oral capsule  -- 1 cap(s) by mouth once a month  -- Indication: For supplement

## 2018-07-05 NOTE — PROGRESS NOTE ADULT - SUBJECTIVE AND OBJECTIVE BOX
Reid Martinez MD  Interventional Cardiology / Advance Heart Failure and Cardiac Transplant Specialist  [place] Office : [address] [place] [id number]  Tel:   [place] Office : [address] [facility] [place] [address]  Tel: [phone]  Cell : [phone]    Subjective : Pt lying in bed comfortable, not in distress, denies any chest pain or SOB  	  MEDICATIONS:  amiodarone    Tablet 200 milliGRAM(s) Oral daily  aspirin  chewable 81 milliGRAM(s) Oral daily  clopidogrel Tablet 75 milliGRAM(s) Oral daily  metoprolol tartrate 25 milliGRAM(s) Oral two times a day  tamsulosin 0.4 milliGRAM(s) Oral at bedtime      ALBUTerol/ipratropium for Nebulization 3 milliLiter(s) Nebulizer every 6 hours PRN    acetaminophen   Tablet. 650 milliGRAM(s) Oral every 6 hours PRN  acetaminophen  Suppository 650 milliGRAM(s) Rectal every 6 hours PRN  busPIRone 5 milliGRAM(s) Oral two times a day    pantoprazole    Tablet 40 milliGRAM(s) Oral before breakfast    atorvastatin 40 milliGRAM(s) Oral at bedtime  dextrose 40% Gel 15 Gram(s) Oral once PRN  dextrose 50% Injectable 12.5 Gram(s) IV Push once  dextrose 50% Injectable 25 Gram(s) IV Push once  dextrose 50% Injectable 25 Gram(s) IV Push once  glucagon  Injectable 1 milliGRAM(s) IntraMuscular once PRN  insulin lispro (HumaLOG) corrective regimen sliding scale   SubCutaneous three times a day before meals  insulin lispro (HumaLOG) corrective regimen sliding scale   SubCutaneous at bedtime    artificial  tears Solution 1 Drop(s) Both EYES four times a day  epoetin georgie Injectable 4000 Unit(s) IV Push <User Schedule>  multivitamin 1 Tablet(s) Oral daily      PHYSICAL EXAM:  T(C): 36.8 (07-05-18 @ 14:30), Max: 37.1 (07-04-18 @ 21:54)  HR: 74 (07-05-18 @ 19:37) (64 - 77)  BP: 121/62 (07-05-18 @ 17:09) (116/64 - 134/70)  RR: 18 (07-05-18 @ 14:30) (18 - 18)  SpO2: 97% (07-05-18 @ 19:37) (97% - 100%)  Wt(kg): --  I&O's Summary    04 Jul 2018 07:01  -  05 Jul 2018 07:00  --------------------------------------------------------  IN: 640 mL / OUT: 1900 mL / NET: -1260 mL          Appearance: Normal	  HEENT:   Normal oral mucosa, PERRL, EOMI	  Cardiovascular: Normal S1 S2, No JVD, No murmurs, No edema  Respiratory: Lungs clear to auscultation	  Gastrointestinal:  Soft, Non-tender, + BS	  Extremities: Normal range of motion, No clubbing, cyanosis or edema                                    8.3    6.32  )-----------( 249      ( 05 Jul 2018 07:25 )             26.6     07-05    140  |  97<L>  |  28<H>  ----------------------------<  119<H>  3.8   |  27  |  3.69<H>    Ca    9.4      05 Jul 2018 07:25      proBNP:   Lipid Profile:   HgA1c:   TSH:

## 2018-07-05 NOTE — DISCHARGE NOTE ADULT - CARE PROVIDERS DIRECT ADDRESSES
,DirectAddress_Unknown ,DirectAddress_Unknown,[email or web address],DirectAddress_Unknown

## 2018-07-05 NOTE — DISCHARGE NOTE ADULT - ADDITIONAL INSTRUCTIONS
Follow with PMD Dr Salcido within 1 week. Call for appointment -  take medications as prescribed  Monitor your weight and record results. Bring results with you to MD appointment Follow with PMD Dr Salcido within 1 week. Call for appointment -[phone]  take medications as prescribed  Monitor your weight and record results. Bring results with you to MD appointment  Follow up with consultants - Dr Weiss of gastroenterology, Dr Martinez of cardiology, Dr Michel of nephrology. Call to make appointments

## 2018-07-05 NOTE — PROGRESS NOTE ADULT - SUBJECTIVE AND OBJECTIVE BOX
JIMI BUCHANAN:[id number],   72yMale followed for:  No Known Allergies    PAST MEDICAL & SURGICAL HISTORY:  CVA (cerebral vascular accident)  COPD (chronic obstructive pulmonary disease)  BPH (benign prostatic hyperplasia)  Bipolar affective disorder  CKD (chronic kidney disease)  Pancreatitis  Hypertension  Dyslipidemia  Diabetes mellitus  Coronary artery disease  HLD (hyperlipidemia)  Anemia  Acute renal failure  CKD (chronic kidney disease)  COPD (chronic obstructive pulmonary disease)  Fainting  Cardiac arrhythmia  Dizziness  Hydronephrosis  Cholecystitis  Bipolar 1 disorder  DM (diabetes mellitus)  HTN (hypertension)  Lumbar radiculopathy  Left heart failure  Reflux esophagitis  Coronary atherosclerosis  History of cardiac catheterization  No significant past surgical history    FAMILY HISTORY:  No pertinent family history in first degree relatives    MEDICATIONS  (STANDING):  amiodarone    Tablet 200 milliGRAM(s) Oral daily  ampicillin  IVPB 2 Gram(s) IV Intermittent every 12 hours  artificial  tears Solution 1 Drop(s) Both EYES four times a day  aspirin  chewable 81 milliGRAM(s) Oral daily  atorvastatin 40 milliGRAM(s) Oral at bedtime  busPIRone 5 milliGRAM(s) Oral two times a day  clopidogrel Tablet 75 milliGRAM(s) Oral daily  dextrose 50% Injectable 12.5 Gram(s) IV Push once  dextrose 50% Injectable 25 Gram(s) IV Push once  dextrose 50% Injectable 25 Gram(s) IV Push once  epoetin georgie Injectable 4000 Unit(s) IV Push <User Schedule>  insulin lispro (HumaLOG) corrective regimen sliding scale   SubCutaneous three times a day before meals  insulin lispro (HumaLOG) corrective regimen sliding scale   SubCutaneous at bedtime  metoprolol tartrate 25 milliGRAM(s) Oral two times a day  multivitamin 1 Tablet(s) Oral daily  pantoprazole    Tablet 40 milliGRAM(s) Oral before breakfast  tamsulosin 0.4 milliGRAM(s) Oral at bedtime    MEDICATIONS  (PRN):  acetaminophen  Suppository 650 milliGRAM(s) Rectal every 6 hours PRN For Temp greater than 38 C (100.4 F)  ALBUTerol/ipratropium for Nebulization 3 milliLiter(s) Nebulizer every 6 hours PRN Shortness of Breath  dextrose 40% Gel 15 Gram(s) Oral once PRN Blood Glucose LESS THAN 70 milliGRAM(s)/deciliter  glucagon  Injectable 1 milliGRAM(s) IntraMuscular once PRN Glucose LESS THAN 70 milligrams/deciliter      Vital Signs Last 24 Hrs  T(C): 36.8 (05 Jul 2018 05:05), Max: 37.1 (04 Jul 2018 21:54)  T(F): 98.3 (05 Jul 2018 05:05), Max: 98.8 (04 Jul 2018 21:54)  HR: 66 (05 Jul 2018 07:54) (66 - 85)  BP: 134/70 (05 Jul 2018 05:05) (116/60 - 141/69)  BP(mean): --  RR: 18 (05 Jul 2018 05:05) (17 - 20)  SpO2: 98% (05 Jul 2018 07:54) (98% - 100%)  nc/at  s1s2  cta  soft, nt, nd no guarding or rebound  no c/c/e    CBC Full  -  ( 04 Jul 2018 05:55 )  WBC Count : 8.06 K/uL  Hemoglobin : 9.0 g/dL  Hematocrit : 29.7 %  Platelet Count - Automated : 254 K/uL  Mean Cell Volume : 94.9 fL  Mean Cell Hemoglobin : 28.8 pg  Mean Cell Hemoglobin Concentration : 30.3 %  Auto Neutrophil # : x  Auto Lymphocyte # : x  Auto Monocyte # : x  Auto Eosinophil # : x  Auto Basophil # : x  Auto Neutrophil % : x  Auto Lymphocyte % : x  Auto Monocyte % : x  Auto Eosinophil % : x  Auto Basophil % : x    07-04    140  |  96<L>  |  53<H>  ----------------------------<  121<H>  3.6   |  25  |  5.76<H>    Ca    9.4      04 Jul 2018 05:55      PT/INR - ( 04 Jul 2018 05:55 )   PT: 14.0 SEC;   INR: 1.26

## 2018-07-05 NOTE — PROGRESS NOTE ADULT - PROBLEM SELECTOR PLAN 1
< from: CT Abdomen and Pelvis w/ IV Cont (06.22.18 @ 10:41) Atrophic kidneys with cortical scarring and heterogeneous right renal   enhancement, question pyelonephritis. Correlate with urinalysis/culture.  Circumferential mild rectal wall thickening. Correlate for signs of   proctitis.Bladder stone.Bilateral pleural effusions.   s/p ampicillin 2 gm iv q 12 for 14 days of abx with ampicillin

## 2018-07-05 NOTE — DISCHARGE NOTE ADULT - PATIENT PORTAL LINK FT
You can access the [facility], offered by [facility], by registering with the following website: [email or web address]

## 2018-07-05 NOTE — DISCHARGE NOTE ADULT - MEDICATION SUMMARY - MEDICATIONS TO STOP TAKING
I will STOP taking the medications listed below when I get home from the hospital:    Breo Ellipta 100 mcg-25 mcg/inh inhalation powder  -- 1 puff(s) inhaled once a day, home    Nephro-Rachel oral tablet  -- 1 tab(s) by mouth once a day    Coumadin 2 mg oral tablet  -- 1 tab(s) by mouth once a day

## 2018-07-05 NOTE — DISCHARGE NOTE ADULT - SECONDARY DIAGNOSIS.
Acute on chronic systolic (congestive) heart failure Atrial fibrillation, unspecified type Bacteremia Bipolar affective disorder, remission status unspecified Anemia CKD (chronic kidney disease)

## 2018-07-05 NOTE — DISCHARGE NOTE ADULT - CARE PROVIDER_API CALL
Sam Salcido), Internal Medicine  [address] [place] [address]  Phone: [phone]  Fax: [phone] Sam Salcido), Internal Medicine  [address] [place] [address]  Phone: [phone]  Fax: [phone]    Joel Weiss (DO), Gastroenterology; Internal Medicine  [address] [address]  Phone: [phone]  Fax: [phone]    Reid Martinez), Cardiovascular Disease; Internal Medicine  [address] [place] [address]  Phone: [phone]  Fax: [phone]

## 2018-07-06 VITALS — DIASTOLIC BLOOD PRESSURE: 65 MMHG | HEART RATE: 75 BPM | SYSTOLIC BLOOD PRESSURE: 125 MMHG

## 2018-07-06 LAB
BUN SERPL-MCNC: 45 MG/DL — HIGH (ref 7–23)
CALCIUM SERPL-MCNC: 10 MG/DL — SIGNIFICANT CHANGE UP (ref 8.4–10.5)
CHLORIDE SERPL-SCNC: 98 MMOL/L — SIGNIFICANT CHANGE UP (ref 98–107)
CO2 SERPL-SCNC: 25 MMOL/L — SIGNIFICANT CHANGE UP (ref 22–31)
CREAT SERPL-MCNC: 5.35 MG/DL — HIGH (ref 0.5–1.3)
GLUCOSE BLDC GLUCOMTR-MCNC: 106 MG/DL — HIGH (ref 70–99)
GLUCOSE BLDC GLUCOMTR-MCNC: 110 MG/DL — HIGH (ref 70–99)
GLUCOSE BLDC GLUCOMTR-MCNC: 166 MG/DL — HIGH (ref 70–99)
GLUCOSE BLDC GLUCOMTR-MCNC: 221 MG/DL — HIGH (ref 70–99)
GLUCOSE SERPL-MCNC: 111 MG/DL — HIGH (ref 70–99)
HCT VFR BLD CALC: 25.9 % — LOW (ref 39–50)
HGB BLD-MCNC: 8 G/DL — LOW (ref 13–17)
MCHC RBC-ENTMCNC: 28.1 PG — SIGNIFICANT CHANGE UP (ref 27–34)
MCHC RBC-ENTMCNC: 30.9 % — LOW (ref 32–36)
MCV RBC AUTO: 90.9 FL — SIGNIFICANT CHANGE UP (ref 80–100)
NRBC # FLD: 0 — SIGNIFICANT CHANGE UP
PLATELET # BLD AUTO: 226 K/UL — SIGNIFICANT CHANGE UP (ref 150–400)
PMV BLD: 9.4 FL — SIGNIFICANT CHANGE UP (ref 7–13)
POTASSIUM SERPL-MCNC: 3.6 MMOL/L — SIGNIFICANT CHANGE UP (ref 3.5–5.3)
POTASSIUM SERPL-SCNC: 3.6 MMOL/L — SIGNIFICANT CHANGE UP (ref 3.5–5.3)
RBC # BLD: 2.85 M/UL — LOW (ref 4.2–5.8)
RBC # FLD: 15.3 % — HIGH (ref 10.3–14.5)
SODIUM SERPL-SCNC: 141 MMOL/L — SIGNIFICANT CHANGE UP (ref 135–145)
WBC # BLD: 6.6 K/UL — SIGNIFICANT CHANGE UP (ref 3.8–10.5)
WBC # FLD AUTO: 6.6 K/UL — SIGNIFICANT CHANGE UP (ref 3.8–10.5)

## 2018-07-06 PROCEDURE — 90935 HEMODIALYSIS ONE EVALUATION: CPT

## 2018-07-06 RX ADMIN — Medication 5 MILLIGRAM(S): at 17:37

## 2018-07-06 RX ADMIN — Medication 1 TABLET(S): at 13:17

## 2018-07-06 RX ADMIN — CLOPIDOGREL BISULFATE 75 MILLIGRAM(S): 75 TABLET, FILM COATED ORAL at 13:17

## 2018-07-06 RX ADMIN — Medication 81 MILLIGRAM(S): at 13:17

## 2018-07-06 RX ADMIN — ERYTHROPOIETIN 4000 UNIT(S): 10000 INJECTION, SOLUTION INTRAVENOUS; SUBCUTANEOUS at 07:28

## 2018-07-06 RX ADMIN — Medication 2: at 17:37

## 2018-07-06 RX ADMIN — Medication 5 MILLIGRAM(S): at 05:27

## 2018-07-06 RX ADMIN — Medication 1 DROP(S): at 17:00

## 2018-07-06 RX ADMIN — Medication 1 DROP(S): at 00:00

## 2018-07-06 RX ADMIN — Medication 1 DROP(S): at 11:15

## 2018-07-06 RX ADMIN — AMIODARONE HYDROCHLORIDE 200 MILLIGRAM(S): 400 TABLET ORAL at 05:27

## 2018-07-06 RX ADMIN — PANTOPRAZOLE SODIUM 40 MILLIGRAM(S): 20 TABLET, DELAYED RELEASE ORAL at 05:27

## 2018-07-06 RX ADMIN — Medication 1: at 13:17

## 2018-07-06 RX ADMIN — Medication 25 MILLIGRAM(S): at 17:37

## 2018-07-06 RX ADMIN — Medication 1 DROP(S): at 05:28

## 2018-07-06 NOTE — PROGRESS NOTE ADULT - PROBLEM SELECTOR PROBLEM 7
Coronary artery disease involving native coronary artery of native heart without angina pectoris
Atrial fibrillation, unspecified type
Coronary artery disease involving native coronary artery of native heart without angina pectoris
Coronary artery disease involving native coronary artery of native heart without angina pectoris
Atrial fibrillation, unspecified type
Coronary artery disease involving native coronary artery of native heart without angina pectoris
Atrial fibrillation, unspecified type
Bipolar affective disorder, remission status unspecified
Coronary artery disease involving native coronary artery of native heart without angina pectoris

## 2018-07-06 NOTE — PROGRESS NOTE ADULT - PROBLEM SELECTOR PLAN 10
- not on any hypoglycemic agents  - Humalog sliding scale

## 2018-07-06 NOTE — PROGRESS NOTE ADULT - PROBLEM SELECTOR PROBLEM 8
Atrial fibrillation, unspecified type
Bipolar affective disorder, remission status unspecified
Atrial fibrillation, unspecified type
Atrial fibrillation, unspecified type
Bipolar affective disorder, remission status unspecified
Atrial fibrillation, unspecified type
Bipolar affective disorder, remission status unspecified
Type 2 diabetes mellitus with chronic kidney disease on chronic dialysis, without long-term current use of insulin

## 2018-07-06 NOTE — PROGRESS NOTE ADULT - PROBLEM SELECTOR PROBLEM 5
ESRD on hemodialysis
Acute on chronic systolic (congestive) heart failure
ESRD on hemodialysis
ESRD on hemodialysis
Acute on chronic systolic (congestive) heart failure
ESRD on hemodialysis
Acute on chronic systolic (congestive) heart failure
Coronary artery disease involving native coronary artery of native heart without angina pectoris
ESRD on hemodialysis

## 2018-07-06 NOTE — PROGRESS NOTE ADULT - PROBLEM SELECTOR PLAN 7
- continue ASA and Plavix  - continue Lipitor
- INR subtherapeutic  - Will d/w cardiology re starting heparin for possible NSTEMI vs continuing coumadin  - continue metoprolol and amiodarone
- continue ASA and Plavix  - continue Lipitor
- continue ASA and Plavix  - continue Lipitor
- INR subtherapeutic  - Will d/w cardiology re starting heparin for possible NSTEMI vs continuing coumadin  - continue metoprolol and amiodarone
- continue ASA and Plavix  - continue Lipitor
- INR subtherapeutic  - Will d/w cardiology re starting heparin for possible NSTEMI vs continuing coumadin  - continue metoprolol and amiodarone
- continue ASA and Plavix  - continue Lipitor
- not on medication

## 2018-07-06 NOTE — PROGRESS NOTE ADULT - PROBLEM SELECTOR PLAN 5
- HD as above
- continue ASA and Plavix  - continue Lipitor

## 2018-07-06 NOTE — PROGRESS NOTE ADULT - PROBLEM SELECTOR PLAN 3
Likely demand ischemia.  HS troponin increased from 237 to 258  - will consult cardiology given significant cardiac history and recent NSTEMI requiring 5 stents in September and support with Ilda
Likely demand ischemia.  HS troponin increased from 237 to 258  - will consult cardiology given significant cardiac history and recent NSTEMI requiring 5 stents in September and support with Ilda
CARDS F/U
Hb noted to be low. Monitor CBC can transfuse with HD as needed.
Likely demand ischemia.  HS troponin increased from 237 to 258  - will consult cardiology given significant cardiac history and recent NSTEMI requiring 5 stents in September and support with Ilda
Likely demand ischemia.  HS troponin increased from 237 to 258  - will consult cardiology given significant cardiac history and recent NSTEMI requiring 5 stents in September and support with Ilda
Hb noted to be low. C/w epogen TIW. Transfusions as per primary team. Monitor Hb.
Likely demand ischemia.  HS troponin increased from 237 to 258  - will consult cardiology given significant cardiac history and recent NSTEMI requiring 5 stents in September and support with Ilda
Likely demand ischemia.  HS troponin increased from 237 to 258  - will consult cardiology given significant cardiac history and recent NSTEMI requiring 5 stents in September and support with Ilda
- HD as above
Hb noted to be low. C/w epogen TIW. Transfusions as per primary team. Monitor Hb.
Hb noted to be low. Not on epogen as outpatient due to history of CVA?? Will hold off on CABRERA therapy for now. Monitor Hb.
Likely demand ischemia.  HS troponin increased from 237 to 258  - will consult cardiology given significant cardiac history and recent NSTEMI requiring 5 stents in September and support with Ilda
Recommend to hold antihypertensive medications for now. Monitor BP.
Recommend to hold antihypertensive medications for now. Monitor BP.
Hb noted to be low. Not on epogen as outpatient due to history of CVA?? Will hold off on CABRERA therapy for now. Monitor Hb.
Likely demand ischemia.  HS troponin increased from 237 to 258  - will consult cardiology given significant cardiac history and recent NSTEMI requiring 5 stents in September and support with Ilda

## 2018-07-06 NOTE — PROGRESS NOTE ADULT - PROBLEM SELECTOR PROBLEM 2
Acute respiratory failure with hypoxia
Renovascular hypertension
Acute respiratory failure with hypoxia
Anemia
Elevated troponin
Hypervolemia
Hypervolemia
Renovascular hypertension
Acute respiratory failure with hypoxia

## 2018-07-06 NOTE — PROGRESS NOTE ADULT - PROBLEM SELECTOR PROBLEM 6
Acute on chronic systolic (congestive) heart failure
Coronary artery disease involving native coronary artery of native heart without angina pectoris
Acute on chronic systolic (congestive) heart failure
Acute on chronic systolic (congestive) heart failure
Coronary artery disease involving native coronary artery of native heart without angina pectoris
Acute on chronic systolic (congestive) heart failure
Atrial fibrillation, unspecified type
Coronary artery disease involving native coronary artery of native heart without angina pectoris

## 2018-07-06 NOTE — PROGRESS NOTE ADULT - PROBLEM SELECTOR PROBLEM 1
Bacteremia
ESRD on dialysis
ESRD on dialysis
Fever
Acute respiratory failure with hypoxia
Bacteremia
ESRD on dialysis
Fever
ESRD on dialysis
ESRD on dialysis
Bacteremia
Fever
Fever
Bacteremia

## 2018-07-06 NOTE — PROGRESS NOTE ADULT - PROBLEM SELECTOR PLAN 1
Pt. tolerating HD. BP stable during HD. AVF functioning well. Hemoglobin level below target,. Pt. on CABRERA treatment. Monitor BP and labs.

## 2018-07-06 NOTE — PROGRESS NOTE ADULT - SUBJECTIVE AND OBJECTIVE BOX
[facility] DIVISION OF KIDNEY DISEASES AND HYPERTENSION --   --------------------------------------------------------------------------------  Chief Complaint: ESRD/Ongoing hemodialysis requirement    24 hour events/subjective: Pt. seen and examined during HD today. Pt. feels better and denies CP, SOB, dizziness or HA.     PAST HISTORY  --------------------------------------------------------------------------------  No significant changes to PMH, PSH, FHx, SHx, unless otherwise noted    ALLERGIES & MEDICATIONS  --------------------------------------------------------------------------------  Allergies    No Known Allergies    Intolerances    Standing Inpatient Medications  amiodarone    Tablet 200 milliGRAM(s) Oral daily  artificial  tears Solution 1 Drop(s) Both EYES four times a day  aspirin  chewable 81 milliGRAM(s) Oral daily  atorvastatin 40 milliGRAM(s) Oral at bedtime  busPIRone 5 milliGRAM(s) Oral two times a day  clopidogrel Tablet 75 milliGRAM(s) Oral daily  dextrose 50% Injectable 12.5 Gram(s) IV Push once  dextrose 50% Injectable 25 Gram(s) IV Push once  dextrose 50% Injectable 25 Gram(s) IV Push once  epoetin georgie Injectable 4000 Unit(s) IV Push <User Schedule>  insulin lispro (HumaLOG) corrective regimen sliding scale   SubCutaneous three times a day before meals  insulin lispro (HumaLOG) corrective regimen sliding scale   SubCutaneous at bedtime  metoprolol tartrate 25 milliGRAM(s) Oral two times a day  multivitamin 1 Tablet(s) Oral daily  pantoprazole    Tablet 40 milliGRAM(s) Oral before breakfast  tamsulosin 0.4 milliGRAM(s) Oral at bedtime    REVIEW OF SYSTEMS  --------------------------------------------------------------------------------  Gen: No fever  Respiratory: No dyspnea  CV: No chest pain  GI: No abdominal pain  Neuro: No dizziness  VITALS/PHYSICAL EXAM  --------------------------------------------------------------------------------  T(C): 36.4 (07-06-18 @ 06:51), Max: 36.9 (07-05-18 @ 22:43)  HR: 78 (07-06-18 @ 06:51) (64 - 78)  BP: 132/55 (07-06-18 @ 06:51) (116/64 - 135/67)  RR: 16 (07-06-18 @ 06:51) (16 - 18)  SpO2: 100% (07-06-18 @ 05:25) (97% - 100%)  Wt(kg): --    Physical Exam:  Gen: resting, NAD  	Pulm: fair air entry B/L  	CV: S1S2  	Abd: Soft, NT   	Ext: No LE edema present  	Neuro: Awake  	Vascular access: RUE AVF site: no bleeding/redness     LABS/STUDIES  --------------------------------------------------------------------------------              8.0    6.60  >-----------<  226      [07-06-18 @ 06:50]              25.9     141  |  98  |  45  ----------------------------<  111      [07-06-18 @ 06:50]  3.6   |  25  |  5.35        Ca     10.0     [07-06-18 @ 06:50]    Iron 41, TIBC 157, %sat --      [06-20-18 @ 06:00]  Ferritin 2578      [06-20-18 @ 06:00]  PTH 76.54 (Ca --)      [06-20-18 @ 06:00]   --  PTH -- (Ca 8.5)      [09-08-17 @ 09:29]   156    HBsAb <3.0      [06-19-18 @ 17:05]  HBsAg NEGATIVE      [06-19-18 @ 17:05]  HBcAb Nonreactive      [06-19-18 @ 17:05]  HCV 0.35, Nonreactive Hepatitis C AB  S/CO Ratio                        Interpretation  < 1.0                                     Non-Reactive  1.0 - 4.9                           Weakly-Reactive  > 5.0                                 Reactive  Non-Reactive: Aperson with a non-reactive HCV antibody  result is considered uninfected.  No further action is  needed unless recent infection is suspected.  In these  cases, consider repeat testing later to detect  seroconversion..  Weakly-Reactive: HCV antibody test is abnormal, HCV RNA  Qualitative test will follow.  Reactive: HCV antibody test is abnormal, HCV RNA  Qualitative test will follow.  Note: HCV antibody testing is performed on the [employer]   system.      [06-19-18 @ 17:05]

## 2018-07-06 NOTE — PROGRESS NOTE ADULT - PROBLEM SELECTOR PROBLEM 3
Elevated troponin
Anemia
Elevated troponin
Anemia
Elevated troponin
Elevated troponin
Anemia
Anemia
ESRD on hemodialysis
Elevated troponin
Renovascular hypertension
Renovascular hypertension
Anemia
Elevated troponin
Elevated troponin

## 2018-07-06 NOTE — PROGRESS NOTE ADULT - PROBLEM SELECTOR PLAN 6
- HD as above
- continue ASA and Plavix  - continue Lipitor
- HD as above
- HD as above
- continue ASA and Plavix  - continue Lipitor
- HD as above
- INR subtherapeutic  - Will d/w cardiology re starting heparin for possible NSTEMI vs continuing coumadin  - continue metoprolol and amiodarone
- continue ASA and Plavix  - continue Lipitor

## 2018-07-06 NOTE — PROGRESS NOTE ADULT - SUBJECTIVE AND OBJECTIVE BOX
chief complaint : dyspnea        SUBJECTIVE / OVERNIGHT EVENTS: pt appears comfortable, denies chest pain, shortness of breath , nausea, vomiting,     MEDICATIONS  (STANDING):  amiodarone    Tablet 200 milliGRAM(s) Oral daily  artificial  tears Solution 1 Drop(s) Both EYES four times a day  aspirin  chewable 81 milliGRAM(s) Oral daily  atorvastatin 40 milliGRAM(s) Oral at bedtime  busPIRone 5 milliGRAM(s) Oral two times a day  clopidogrel Tablet 75 milliGRAM(s) Oral daily  dextrose 50% Injectable 12.5 Gram(s) IV Push once  dextrose 50% Injectable 25 Gram(s) IV Push once  dextrose 50% Injectable 25 Gram(s) IV Push once  epoetin georgie Injectable 4000 Unit(s) IV Push <User Schedule>  insulin lispro (HumaLOG) corrective regimen sliding scale   SubCutaneous three times a day before meals  insulin lispro (HumaLOG) corrective regimen sliding scale   SubCutaneous at bedtime  metoprolol tartrate 25 milliGRAM(s) Oral two times a day  multivitamin 1 Tablet(s) Oral daily  pantoprazole    Tablet 40 milliGRAM(s) Oral before breakfast  tamsulosin 0.4 milliGRAM(s) Oral at bedtime    MEDICATIONS  (PRN):  acetaminophen   Tablet. 650 milliGRAM(s) Oral every 6 hours PRN Mild Pain (1 - 3)  acetaminophen  Suppository 650 milliGRAM(s) Rectal every 6 hours PRN For Temp greater than 38 C (100.4 F)  ALBUTerol/ipratropium for Nebulization 3 milliLiter(s) Nebulizer every 6 hours PRN Shortness of Breath  dextrose 40% Gel 15 Gram(s) Oral once PRN Blood Glucose LESS THAN 70 milliGRAM(s)/deciliter  glucagon  Injectable 1 milliGRAM(s) IntraMuscular once PRN Glucose LESS THAN 70 milligrams/deciliter    Vital Signs Last 24 Hrs  T(C): 36.8 (06 Jul 2018 13:07), Max: 36.9 (05 Jul 2018 22:43)  T(F): 98.2 (06 Jul 2018 13:07), Max: 98.4 (05 Jul 2018 22:43)  HR: 75 (06 Jul 2018 17:15) (72 - 80)  BP: 125/65 (06 Jul 2018 17:15) (111/63 - 135/67)  BP(mean): --  RR: 16 (06 Jul 2018 13:07) (16 - 16)  SpO2: 100% (06 Jul 2018 13:07) (97% - 100%)    Constitutional: No fever, fatigue  Skin: No rash.  Eyes: No recent vision problems or eye pain.  ENT: No congestion, ear pain, or sore throat.  Cardiovascular: No chest pain or palpation.  Respiratory: No cough, shortness of breath, congestion, or wheezing.  Gastrointestinal: No abdominal pain, nausea, vomiting, or diarrhea.  Genitourinary: No dysuria.  Musculoskeletal: No joint swelling.  Neurologic: No headache.    PHYSICAL EXAM:  GENERAL: NAD  EYES: EOMI, PERRLA  NECK: Supple, No JVD  CHEST/LUNG: dec breath sounds at bases,  HEART:  S1 , S2 +  ABDOMEN: soft bs+  EXTREMITIES:  trace edema+  NEUROLOGY:alert awake     LABS:  07-06    141  |  98  |  45<H>  ----------------------------<  111<H>  3.6   |  25  |  5.35<H>    Ca    10.0      06 Jul 2018 06:50      Creatinine Trend: 5.35 <--, 3.69 <--, 5.76 <--, 4.15 <--, 6.30 <--, 5.24 <--, 3.84 <--                        8.0    6.60  )-----------( 226      ( 06 Jul 2018 06:50 )             25.9     Urine Studies:              PT/INR - ( 05 Jul 2018 07:25 )   PT: 13.2 SEC;   INR: 1.14          PTT - ( 05 Jul 2018 07:25 )  PTT:34.1 SEC

## 2018-07-06 NOTE — PROGRESS NOTE ADULT - PROVIDER SPECIALTY LIST ADULT
Cardiology
Gastroenterology
Infectious Disease
Internal Medicine
Nephrology
Cardiology
Gastroenterology
Gastroenterology
Cardiology
Nephrology
Internal Medicine

## 2018-07-06 NOTE — PROGRESS NOTE ADULT - SUBJECTIVE AND OBJECTIVE BOX
JIMI BUCHANAN:[id number],   72yMale followed for:  No Known Allergies    PAST MEDICAL & SURGICAL HISTORY:  CVA (cerebral vascular accident)  COPD (chronic obstructive pulmonary disease)  BPH (benign prostatic hyperplasia)  Bipolar affective disorder  CKD (chronic kidney disease)  Pancreatitis  Hypertension  Dyslipidemia  Diabetes mellitus  Coronary artery disease  HLD (hyperlipidemia)  Anemia  Acute renal failure  CKD (chronic kidney disease)  COPD (chronic obstructive pulmonary disease)  Fainting  Cardiac arrhythmia  Dizziness  Hydronephrosis  Cholecystitis  Bipolar 1 disorder  DM (diabetes mellitus)  HTN (hypertension)  Lumbar radiculopathy  Left heart failure  Reflux esophagitis  Coronary atherosclerosis  History of cardiac catheterization  No significant past surgical history    FAMILY HISTORY:  No pertinent family history in first degree relatives    MEDICATIONS  (STANDING):  amiodarone    Tablet 200 milliGRAM(s) Oral daily  artificial  tears Solution 1 Drop(s) Both EYES four times a day  aspirin  chewable 81 milliGRAM(s) Oral daily  atorvastatin 40 milliGRAM(s) Oral at bedtime  busPIRone 5 milliGRAM(s) Oral two times a day  clopidogrel Tablet 75 milliGRAM(s) Oral daily  dextrose 50% Injectable 12.5 Gram(s) IV Push once  dextrose 50% Injectable 25 Gram(s) IV Push once  dextrose 50% Injectable 25 Gram(s) IV Push once  epoetin georgie Injectable 4000 Unit(s) IV Push <User Schedule>  insulin lispro (HumaLOG) corrective regimen sliding scale   SubCutaneous three times a day before meals  insulin lispro (HumaLOG) corrective regimen sliding scale   SubCutaneous at bedtime  metoprolol tartrate 25 milliGRAM(s) Oral two times a day  multivitamin 1 Tablet(s) Oral daily  pantoprazole    Tablet 40 milliGRAM(s) Oral before breakfast  tamsulosin 0.4 milliGRAM(s) Oral at bedtime    MEDICATIONS  (PRN):  acetaminophen   Tablet. 650 milliGRAM(s) Oral every 6 hours PRN Mild Pain (1 - 3)  acetaminophen  Suppository 650 milliGRAM(s) Rectal every 6 hours PRN For Temp greater than 38 C (100.4 F)  ALBUTerol/ipratropium for Nebulization 3 milliLiter(s) Nebulizer every 6 hours PRN Shortness of Breath  dextrose 40% Gel 15 Gram(s) Oral once PRN Blood Glucose LESS THAN 70 milliGRAM(s)/deciliter  glucagon  Injectable 1 milliGRAM(s) IntraMuscular once PRN Glucose LESS THAN 70 milligrams/deciliter      Vital Signs Last 24 Hrs  T(C): 36.4 (06 Jul 2018 06:51), Max: 36.9 (05 Jul 2018 22:43)  T(F): 97.5 (06 Jul 2018 06:51), Max: 98.4 (05 Jul 2018 22:43)  HR: 78 (06 Jul 2018 06:51) (64 - 78)  BP: 132/55 (06 Jul 2018 06:51) (116/64 - 135/67)  BP(mean): --  RR: 16 (06 Jul 2018 06:51) (16 - 18)  SpO2: 100% (06 Jul 2018 05:25) (97% - 100%)  nc/at  s1s2  cta  soft, nt, nd no guarding or rebound  no c/c/e    CBC Full  -  ( 05 Jul 2018 07:25 )  WBC Count : 6.32 K/uL  Hemoglobin : 8.3 g/dL  Hematocrit : 26.6 %  Platelet Count - Automated : 249 K/uL  Mean Cell Volume : 91.1 fL  Mean Cell Hemoglobin : 28.4 pg  Mean Cell Hemoglobin Concentration : 31.2 %  Auto Neutrophil # : x  Auto Lymphocyte # : x  Auto Monocyte # : x  Auto Eosinophil # : x  Auto Basophil # : x  Auto Neutrophil % : x  Auto Lymphocyte % : x  Auto Monocyte % : x  Auto Eosinophil % : x  Auto Basophil % : x    07-05    140  |  97<L>  |  28<H>  ----------------------------<  119<H>  3.8   |  27  |  3.69<H>    Ca    9.4      05 Jul 2018 07:25      PT/INR - ( 05 Jul 2018 07:25 )   PT: 13.2 SEC;   INR: 1.14          PTT - ( 05 Jul 2018 07:25 )  PTT:34.1 SEC

## 2018-07-06 NOTE — PROGRESS NOTE ADULT - SUBJECTIVE AND OBJECTIVE BOX
Reid Martinez MD  Interventional Cardiology / Advance Heart Failure and Cardiac Transplant Specialist  [place] Office : [address] [place] [id number]  Tel:   [place] Office : [address] [facility] [place] [address]  Tel: [phone]  Cell : [phone]    Subjective : Pt lying in bed comfortable, not in distress, denies any chest pain or SOB  	  MEDICATIONS:  amiodarone    Tablet 200 milliGRAM(s) Oral daily  aspirin  chewable 81 milliGRAM(s) Oral daily  clopidogrel Tablet 75 milliGRAM(s) Oral daily  metoprolol tartrate 25 milliGRAM(s) Oral two times a day  tamsulosin 0.4 milliGRAM(s) Oral at bedtime      ALBUTerol/ipratropium for Nebulization 3 milliLiter(s) Nebulizer every 6 hours PRN    acetaminophen   Tablet. 650 milliGRAM(s) Oral every 6 hours PRN  acetaminophen  Suppository 650 milliGRAM(s) Rectal every 6 hours PRN  busPIRone 5 milliGRAM(s) Oral two times a day    pantoprazole    Tablet 40 milliGRAM(s) Oral before breakfast    atorvastatin 40 milliGRAM(s) Oral at bedtime  dextrose 40% Gel 15 Gram(s) Oral once PRN  dextrose 50% Injectable 12.5 Gram(s) IV Push once  dextrose 50% Injectable 25 Gram(s) IV Push once  dextrose 50% Injectable 25 Gram(s) IV Push once  glucagon  Injectable 1 milliGRAM(s) IntraMuscular once PRN  insulin lispro (HumaLOG) corrective regimen sliding scale   SubCutaneous three times a day before meals  insulin lispro (HumaLOG) corrective regimen sliding scale   SubCutaneous at bedtime    artificial  tears Solution 1 Drop(s) Both EYES four times a day  epoetin georgie Injectable 4000 Unit(s) IV Push <User Schedule>  multivitamin 1 Tablet(s) Oral daily      PHYSICAL EXAM:  T(C): 36.8 (07-06-18 @ 13:07), Max: 36.9 (07-05-18 @ 22:43)  HR: 80 (07-06-18 @ 13:07) (72 - 80)  BP: 117/64 (07-06-18 @ 13:07) (111/63 - 135/67)  RR: 16 (07-06-18 @ 13:07) (16 - 16)  SpO2: 100% (07-06-18 @ 13:07) (97% - 100%)  Wt(kg): --  I&O's Summary    06 Jul 2018 07:01  -  06 Jul 2018 17:13  --------------------------------------------------------  IN: 720 mL / OUT: 1471 mL / NET: -751 mL          Appearance: Normal	  HEENT:   Normal oral mucosa, PERRL, EOMI	  Cardiovascular: Normal S1 S2, No JVD, No murmurs, No edema  Respiratory: Lungs clear to auscultation	  Gastrointestinal:  Soft, Non-tender, + BS	  Extremities: Normal range of motion, No clubbing, cyanosis or edema                                    8.0    6.60  )-----------( 226      ( 06 Jul 2018 06:50 )             25.9     07-06    141  |  98  |  45<H>  ----------------------------<  111<H>  3.6   |  25  |  5.35<H>    Ca    10.0      06 Jul 2018 06:50      proBNP:   Lipid Profile:   HgA1c:   TSH:

## 2018-07-06 NOTE — PROGRESS NOTE ADULT - PROBLEM SELECTOR PROBLEM 4
Anemia
ESRD on hemodialysis
Anemia
Anemia
ESRD on hemodialysis
Acute on chronic systolic (congestive) heart failure
Anemia
ESRD on hemodialysis

## 2018-07-06 NOTE — PROGRESS NOTE ADULT - PROBLEM SELECTOR PROBLEM 9
Bipolar affective disorder, remission status unspecified
Type 2 diabetes mellitus with chronic kidney disease on chronic dialysis, without long-term current use of insulin
Bipolar affective disorder, remission status unspecified
Bipolar affective disorder, remission status unspecified
Type 2 diabetes mellitus with chronic kidney disease on chronic dialysis, without long-term current use of insulin
Bipolar affective disorder, remission status unspecified
Type 2 diabetes mellitus with chronic kidney disease on chronic dialysis, without long-term current use of insulin

## 2018-07-06 NOTE — PROGRESS NOTE ADULT - PROBLEM SELECTOR PLAN 8
- STARTED ON HEPARIN + coumadin
- STARTED ON HEPARIN
- STARTED ON HEPARIN + coumadin
- not on medication
- STARTED ON HEPARIN + coumadin
- not on medication
restarted eliquis since pts hb been stable
- INR subtherapeutic  - Will d/w cardiology re starting heparin for possible NSTEMI vs continuing coumadin  - continue metoprolol and amiodarone
- STARTED ON HEPARIN
- STARTED ON HEPARIN
- STARTED ON HEPARIN + coumadin
- STARTED ON HEPARIN + coumadin
- not on any hypoglycemic agents  - Humalog sliding scale
- not on medication
restarted eliquis since pts hb been stable

## 2018-07-06 NOTE — PROGRESS NOTE ADULT - PROBLEM SELECTOR PLAN 9
- not on medication
- not on medication
- not on any hypoglycemic agents  - Humalog sliding scale
- not on medication
- not on medication
- not on any hypoglycemic agents  - Humalog sliding scale
- not on medication
- not on any hypoglycemic agents  - Humalog sliding scale
- not on medication

## 2018-07-06 NOTE — PROGRESS NOTE ADULT - NSHPATTENDINGPLANDISCUSS_GEN_ALL_CORE
PA
patient and HD RN
GIFTY RN
patient and patient's family at bedside
dr nunez
pts daughter , all questions answered
PTS DAUGHTER

## 2018-07-06 NOTE — PROGRESS NOTE ADULT - PROBLEM SELECTOR PLAN 4
likely acute on chronic   fobt check   transfuse during hd
fobt check and hD access site check for bleeding   transfuse 1 unit during hd
likely acute on chronic   fobt check   transfuse during hd
- HD as above
likely acute on chronic   fobt check   transfuse during hd
fobt check and hD access site check for bleeding   transfuse 1 unit during hd
- HD as above
no signs of active bleeding at present
- HD as above
- HD as above
Hb noted to be low. C/w epogen TIW. Transfusions as per primary team. Monitor Hb.
Hb noted to be low. Will start patient on epogen 4000 TIW. Transfusions as per primary team. Monitor Hb.
fobt check and hD access site check for bleeding   transfuse 1 unit during hd
likely acute on chronic   fobt check   transfuse during hd
likely acute on chronic   fobt check   transfuse during hd
likely acute on chronic   fobt check   transfuse tomorrow during hd
no signs of active bleeding at present

## 2018-07-11 ENCOUNTER — INPATIENT (INPATIENT)
Facility: HOSPITAL | Age: 73
LOS: 8 days | Discharge: NURSING, REHAB OR LONG-TERM CARE | End: 2018-07-20
Attending: INTERNAL MEDICINE | Admitting: INTERNAL MEDICINE
Payer: MEDICARE

## 2018-07-11 VITALS
TEMPERATURE: 97 F | HEART RATE: 79 BPM | DIASTOLIC BLOOD PRESSURE: 85 MMHG | OXYGEN SATURATION: 98 % | RESPIRATION RATE: 23 BRPM | SYSTOLIC BLOOD PRESSURE: 135 MMHG

## 2018-07-11 DIAGNOSIS — I10 ESSENTIAL (PRIMARY) HYPERTENSION: ICD-10-CM

## 2018-07-11 DIAGNOSIS — N18.6 END STAGE RENAL DISEASE: ICD-10-CM

## 2018-07-11 DIAGNOSIS — D64.9 ANEMIA, UNSPECIFIED: ICD-10-CM

## 2018-07-11 DIAGNOSIS — J81.0 ACUTE PULMONARY EDEMA: ICD-10-CM

## 2018-07-11 DIAGNOSIS — Z98.890 OTHER SPECIFIED POSTPROCEDURAL STATES: Chronic | ICD-10-CM

## 2018-07-11 LAB
ALBUMIN SERPL ELPH-MCNC: 3.3 G/DL — SIGNIFICANT CHANGE UP (ref 3.3–5)
ALP SERPL-CCNC: 94 U/L — SIGNIFICANT CHANGE UP (ref 40–120)
ALT FLD-CCNC: 19 U/L — SIGNIFICANT CHANGE UP (ref 4–41)
AST SERPL-CCNC: 42 U/L — HIGH (ref 4–40)
BASE EXCESS BLDV CALC-SCNC: -1.5 MMOL/L — SIGNIFICANT CHANGE UP
BASE EXCESS BLDV CALC-SCNC: -2.8 MMOL/L — SIGNIFICANT CHANGE UP
BILIRUB SERPL-MCNC: 0.4 MG/DL — SIGNIFICANT CHANGE UP (ref 0.2–1.2)
BLOOD GAS VENOUS - CREATININE: 5.03 MG/DL — HIGH (ref 0.5–1.3)
BLOOD GAS VENOUS - CREATININE: 5.53 MG/DL — HIGH (ref 0.5–1.3)
BUN SERPL-MCNC: 49 MG/DL — HIGH (ref 7–23)
BUN SERPL-MCNC: 51 MG/DL — HIGH (ref 7–23)
CALCIUM SERPL-MCNC: 9.3 MG/DL — SIGNIFICANT CHANGE UP (ref 8.4–10.5)
CALCIUM SERPL-MCNC: 9.5 MG/DL — SIGNIFICANT CHANGE UP (ref 8.4–10.5)
CHLORIDE BLDV-SCNC: 103 MMOL/L — SIGNIFICANT CHANGE UP (ref 96–108)
CHLORIDE BLDV-SCNC: 99 MMOL/L — SIGNIFICANT CHANGE UP (ref 96–108)
CHLORIDE SERPL-SCNC: 92 MMOL/L — LOW (ref 98–107)
CHLORIDE SERPL-SCNC: 93 MMOL/L — LOW (ref 98–107)
CK MB BLD-MCNC: 11.04 NG/ML — HIGH (ref 1–6.6)
CK MB BLD-MCNC: 11.3 NG/ML — HIGH (ref 1–6.6)
CK MB BLD-MCNC: SIGNIFICANT CHANGE UP (ref 0–2.5)
CK MB BLD-MCNC: SIGNIFICANT CHANGE UP (ref 0–2.5)
CK SERPL-CCNC: 106 U/L — SIGNIFICANT CHANGE UP (ref 30–200)
CK SERPL-CCNC: 97 U/L — SIGNIFICANT CHANGE UP (ref 30–200)
CO2 SERPL-SCNC: 14 MMOL/L — LOW (ref 22–31)
CO2 SERPL-SCNC: 23 MMOL/L — SIGNIFICANT CHANGE UP (ref 22–31)
CREAT SERPL-MCNC: 4.64 MG/DL — HIGH (ref 0.5–1.3)
CREAT SERPL-MCNC: 5.09 MG/DL — HIGH (ref 0.5–1.3)
GAS PNL BLDV: 127 MMOL/L — LOW (ref 136–146)
GAS PNL BLDV: 134 MMOL/L — LOW (ref 136–146)
GLUCOSE BLDC GLUCOMTR-MCNC: 161 MG/DL — HIGH (ref 70–99)
GLUCOSE BLDC GLUCOMTR-MCNC: 255 MG/DL — HIGH (ref 70–99)
GLUCOSE BLDV-MCNC: 345 — HIGH (ref 70–99)
GLUCOSE BLDV-MCNC: 364 — HIGH (ref 70–99)
GLUCOSE SERPL-MCNC: 354 MG/DL — HIGH (ref 70–99)
GLUCOSE SERPL-MCNC: 402 MG/DL — HIGH (ref 70–99)
HCO3 BLDV-SCNC: 21 MMOL/L — SIGNIFICANT CHANGE UP (ref 20–27)
HCO3 BLDV-SCNC: 23 MMOL/L — SIGNIFICANT CHANGE UP (ref 20–27)
HCT VFR BLDV CALC: 27.9 % — LOW (ref 39–51)
HCT VFR BLDV CALC: 29.9 % — LOW (ref 39–51)
HGB BLDV-MCNC: 9 G/DL — LOW (ref 13–17)
HGB BLDV-MCNC: 9.7 G/DL — LOW (ref 13–17)
LACTATE BLDV-MCNC: 4.6 MMOL/L — CRITICAL HIGH (ref 0.5–2)
LACTATE BLDV-MCNC: 5.3 MMOL/L — CRITICAL HIGH (ref 0.5–2)
NT-PROBNP SERPL-SCNC: SIGNIFICANT CHANGE UP PG/ML
PCO2 BLDV: 44 MMHG — SIGNIFICANT CHANGE UP (ref 41–51)
PCO2 BLDV: 53 MMHG — HIGH (ref 41–51)
PH BLDV: 7.27 PH — LOW (ref 7.32–7.43)
PH BLDV: 7.35 PH — SIGNIFICANT CHANGE UP (ref 7.32–7.43)
PO2 BLDV: 49 MMHG — HIGH (ref 35–40)
PO2 BLDV: 65 MMHG — HIGH (ref 35–40)
POTASSIUM BLDV-SCNC: 4.5 MMOL/L — SIGNIFICANT CHANGE UP (ref 3.4–4.5)
POTASSIUM BLDV-SCNC: 4.7 MMOL/L — HIGH (ref 3.4–4.5)
POTASSIUM SERPL-MCNC: 4.6 MMOL/L — SIGNIFICANT CHANGE UP (ref 3.5–5.3)
POTASSIUM SERPL-MCNC: 6.4 MMOL/L — CRITICAL HIGH (ref 3.5–5.3)
POTASSIUM SERPL-SCNC: 4.6 MMOL/L — SIGNIFICANT CHANGE UP (ref 3.5–5.3)
POTASSIUM SERPL-SCNC: 6.4 MMOL/L — CRITICAL HIGH (ref 3.5–5.3)
PROT SERPL-MCNC: 7.6 G/DL — SIGNIFICANT CHANGE UP (ref 6–8.3)
SAO2 % BLDV: 77.6 % — SIGNIFICANT CHANGE UP (ref 60–85)
SAO2 % BLDV: 88.6 % — HIGH (ref 60–85)
SODIUM SERPL-SCNC: 133 MMOL/L — LOW (ref 135–145)
SODIUM SERPL-SCNC: 136 MMOL/L — SIGNIFICANT CHANGE UP (ref 135–145)
TROPONIN T, HIGH SENSITIVITY: 332 NG/L — CRITICAL HIGH (ref ?–14)
TROPONIN T, HIGH SENSITIVITY: 367 NG/L — CRITICAL HIGH (ref ?–14)

## 2018-07-11 PROCEDURE — 71045 X-RAY EXAM CHEST 1 VIEW: CPT | Mod: 26

## 2018-07-11 RX ORDER — FUROSEMIDE 40 MG
40 TABLET ORAL ONCE
Qty: 0 | Refills: 0 | Status: COMPLETED | OUTPATIENT
Start: 2018-07-11 | End: 2018-07-11

## 2018-07-11 RX ORDER — IPRATROPIUM/ALBUTEROL SULFATE 18-103MCG
3 AEROSOL WITH ADAPTER (GRAM) INHALATION ONCE
Qty: 0 | Refills: 0 | Status: COMPLETED | OUTPATIENT
Start: 2018-07-11 | End: 2018-07-11

## 2018-07-11 RX ORDER — CALCIUM GLUCONATE 100 MG/ML
2 VIAL (ML) INTRAVENOUS ONCE
Qty: 0 | Refills: 0 | Status: COMPLETED | OUTPATIENT
Start: 2018-07-11 | End: 2018-07-11

## 2018-07-11 RX ADMIN — Medication 3 MILLILITER(S): at 15:02

## 2018-07-11 RX ADMIN — Medication 40 MILLIGRAM(S): at 15:30

## 2018-07-11 RX ADMIN — Medication 40 MILLIGRAM(S): at 17:51

## 2018-07-11 RX ADMIN — Medication 200 GRAM(S): at 17:58

## 2018-07-11 RX ADMIN — Medication 3 MILLILITER(S): at 15:15

## 2018-07-11 NOTE — ED PROVIDER NOTE - CONSTITUTIONAL, MLM
normal... Ill appearing, well nourished, awake, alert, oriented to person, place, time/situation and in moderate respiratory distress

## 2018-07-11 NOTE — CONSULT NOTE ADULT - PROBLEM SELECTOR RECOMMENDATION 3
Pt with stable Hb of 9.5. Pt with recent CVA as per daughter, will hold off epogen at this time. Pt with stable Hb of 9.5. Pt with recent CVA as per daughter, will hold off epogen at this time. Monitor hemoglobin

## 2018-07-11 NOTE — CONSULT NOTE ADULT - PROBLEM SELECTOR RECOMMENDATION 2
Pt BP currently elevated. Plan for UF with HD. Continue home medications. Monitor BP BP currently elevated. Plan for UF with HD. Continue outpatient BP medications. Monitor BP

## 2018-07-11 NOTE — ED PROVIDER NOTE - CHIEF COMPLAINT
The patient is a 72y Male complaining of multiple medical complaints. The patient is a 72y Male complaining of shortness of breath

## 2018-07-11 NOTE — ED ADULT TRIAGE NOTE - CHIEF COMPLAINT QUOTE
Pt IVETTE from [facility] for hyperglycemia, AMS, chest pain and SOB. Per daughter, last seen normal 8pm last night. Pt on dialysis MWF, R arm fistula. Arrives with 20G in R forearm by EMS, on 3-4L chronic O2. Respirations appear labored Pt IVETTE from [facility] for hyperglycemia, AMS, chest pain and SOB. Chest xray done this AM to r/o Pneumonia. Per daughter, last seen normal 8pm last night. Pt on dialysis MWF, R arm fistula. Arrives with 20G in R forearm by EMS, on 3-4L chronic O2. Respirations appear labored

## 2018-07-11 NOTE — CONSULT NOTE ADULT - ATTENDING COMMENTS
Pt. with ESRD on HD TIW admitted for fluid overload. Pt. underwent urgent HD yesterday. Pt. seen today. Pt. clinically stable. No SOB during rounds. Labs reviewed. Continue with maintenance HD during current hospital stay. Monitor BP and labs

## 2018-07-11 NOTE — CONSULT NOTE ADULT - PROBLEM SELECTOR RECOMMENDATION 9
Pt with ESRD on HD. Last outpatient HD was on Monday. Plan for urgent HD today for respiratory distress and fluid overload. Monitor BMP and BP Pt with ESRD on HD. Last outpatient HD was on Monday. Plan for urgent HD today for respiratory distress and fluid overload management. Monitor BP and labs

## 2018-07-11 NOTE — ED ADULT NURSE NOTE - CHIEF COMPLAINT QUOTE
Pt IVETTE from [facility] for hyperglycemia, AMS, chest pain and SOB. Chest xray done this AM to r/o Pneumonia. Per daughter, last seen normal 8pm last night. Pt on dialysis MWF, R arm fistula. Arrives with 20G in R forearm by EMS, on 3-4L chronic O2. Respirations appear labored

## 2018-07-11 NOTE — ED PROCEDURE NOTE - PROCEDURE ADDITIONAL DETAILS
Focused ED Transthoracic echo 37101 - indication (  SOB    )    Grey scale imaging obtained in four views ( parasternal long, parasternal short, apical and subxiphoid)    No pericardial effusion noted.  No evidence of cardiac tamponade  LV contractility - severely decreased - EPSS 1.35cm.  RV not well seen  IVC - 2.4 - 1.7cm.    Impression: no pericardial effusion, severely decreased contractility, RV not well seen.  Dexeus.

## 2018-07-11 NOTE — ED PROVIDER NOTE - OBJECTIVE STATEMENT
72M BIBA for dyspnea since this morning. Reports gradually worsening since waking with the dyspnea. Associated w/ substernal chest pain in the morning that has since resolved. Recently admitted for similar symptoms r/t missed HD. Due for dialysis this evening. 72M BIBA for dyspnea since this morning. Reports gradually worsening since waking with the dyspnea. Associated w/ substernal chest pain in the morning that has since resolved. Recently admitted for similar symptoms r/t missed HD. Due for dialysis this evening. Exam lungs with rhochi, appears dypsneic

## 2018-07-11 NOTE — ED ADULT NURSE NOTE - OBJECTIVE STATEMENT
Patient received AA&Ox3 sent from [facility] for worsening SOB with dyspnea that started this morning. VSS on 3L NC - pt. placed on BiPAP. Patient denies chest pain, N/V, abdominal pain, dizziness at this time. Patient with right AVF - HD on MWF - and was not dialysis was not performed d/t 'unstable' status per family. Patient presents to ED with 20g PIV in place to left forearm, labs drawn, medications administered per orders. Family at bedside - will continue to monitor.

## 2018-07-11 NOTE — ED PROVIDER NOTE - PROGRESS NOTE DETAILS
Jonathan Weil, PGY2 - lactate noted to be markedly elevated at 5.3. Patient clinically appears mildly improved on bipap. Nephrology consulted for HD. Repeat bmp and vbg. Jonathan Weil, PGY2 - lactate and CO2 downtrending on repeating vbg. Tolerating Bipap. DNR/DNI confirmed with daughter who is his POA. Admitted to medicine. Verbal signout given to MAR.

## 2018-07-11 NOTE — ED PROVIDER NOTE - MEDICAL DECISION MAKING DETAILS
Jonathan Weil, PGY2 - placed on Bipap for acute pulmonary edema. Bedside sono w/ bilateral B lines, severely reduced EF, c/w diminished EF on recent echo. Nebs, steroids for possible concomitant COPD component, admit.

## 2018-07-11 NOTE — CONSULT NOTE ADULT - ASSESSMENT
73 y/o M with CAD s/p 12-14 stents placed in 1990's, and BABAR x 5 placed in Sept 2017, CHF with EF 20%, DM, HTN, HLD, AF on coumadin, CVA , ESRD on HD (MWF) COPD, bipolar disorder, dementia was transferred from [facility] for respiratory distress. Pt. with ESRD on HD TIW now with fluid overload.

## 2018-07-11 NOTE — ED PROVIDER NOTE - ATTENDING CONTRIBUTION TO CARE
73 yo male hx of CKD on dialysis, was too SOB for dialysis this morning, brought to ED for SOB. Brief episode of chest pain today relieved with aspirin, none currently. Pe Bp 135/80 P80 RR 24 sat 98using accessory muscle, rales bilaterally, cor RR ext trace edema.  Imp: Respiratory distress, was dialyzed Monday and not hypertensive, r/o pneumonia vs. CHF/fluid overload. Plan CXR, urgent dialysis 71 yo male hx of CKD on dialysis, was too SOB for dialysis this morning, brought to ED for SOB. Brief episode of chest pain today relieved with aspirin, none currently. Pe Bp 135/80 P80 RR 24 sat 98using accessory muscle, rales bilaterally, cor RR ext trace edema.  Imp: Respiratory distress, was dialyzed Monday and not hypertensive, r/o pneumonia vs. CHF/fluid overload. Plan CXR, urgent dialysis  Addendum: pat admitted, required BiPAP, nephrology consulted

## 2018-07-11 NOTE — CONSULT NOTE ADULT - SUBJECTIVE AND OBJECTIVE BOX
[facility] DIVISION OF KIDNEY DISEASES AND HYPERTENSION -- INITIAL CONSULT NOTE  --------------------------------------------------------------------------------  HPI: 71 y/o M with CAD s/p 12-14 stents placed in 1990's, and BABAR x 5 placed in Sept 2017, CHF with EF 20%, DM, HTN, HLD, AF on coumadin, CVA , ESRD on HD (MW) COPD, bipolar disorder, dementia was transferred from [facility] for respiratory distress. Pt last outpatient dialysis at [place] was on Monday. Pts daughter at bedside and states he was extremely SOB when she was visiting today. Pt was suppose to have his maintenance HD tonight however he was too unstable. Pt has been on dialysis since September of 2017. Pt does not know which nephrologist currently follows him at [facility]. Pt seen and examined in the ER. Pt on Bipap in respiratory distress.     PAST HISTORY  --------------------------------------------------------------------------------  PAST MEDICAL & SURGICAL HISTORY:  CVA (cerebral vascular accident)  COPD (chronic obstructive pulmonary disease)  BPH (benign prostatic hyperplasia)  Bipolar affective disorder  CKD (chronic kidney disease)  Pancreatitis  Hypertension  Dyslipidemia  Diabetes mellitus  Coronary artery disease  HLD (hyperlipidemia)  Anemia  Acute renal failure  CKD (chronic kidney disease)  COPD (chronic obstructive pulmonary disease)  Fainting  Cardiac arrhythmia  Dizziness  Hydronephrosis  Cholecystitis  Bipolar 1 disorder  DM (diabetes mellitus)  HTN (hypertension)  Lumbar radiculopathy  Left heart failure  Reflux esophagitis  Coronary atherosclerosis  History of cardiac catheterization  No significant past surgical history    FAMILY HISTORY:  No pertinent family history in first degree relatives    PAST SOCIAL HISTORY:    ALLERGIES & MEDICATIONS  --------------------------------------------------------------------------------  Allergies    No Known Allergies    Intolerances      Standing Inpatient Medications    PRN Inpatient Medications      REVIEW OF SYSTEMS  --------------------------------------------------------------------------------  Gen: +weakness  Skin: No rashes  Head/Eyes/Ears/Mouth: No headache  Respiratory: + dyspnea  CV: No chest pain, PND, +orthopnea  GI: No abdominal pain  : No increased frequency  MSK: No edema  Neuro: No dizziness/lightheadedness  Heme: No easy bruising or bleeding    All other systems were reviewed and are negative, except as noted.    VITALS/PHYSICAL EXAM  --------------------------------------------------------------------------------  T(C): 37.1 (07-11-18 @ 16:13), Max: 37.1 (07-11-18 @ 16:13)  HR: 87 (07-11-18 @ 17:50) (79 - 87)  BP: 137/69 (07-11-18 @ 17:50) (135/85 - 157/74)  RR: 20 (07-11-18 @ 17:50) (20 - 23)  SpO2: 99% (07-11-18 @ 17:50) (98% - 100%)  Wt(kg): --      Physical Exam:  	Gen: Noted respiratory distress on bipap  	Pulm: rales B/L  	CV: RRR, S1S2; no rub  	Abd: +BS, soft, nontender/nondistended  	UE: Warm, no asterixis  	LE: Warm, no edema  	Psych: Normal affect and mood  	Skin: Warm, without rashes  	Vascular access: RUE AVF + thrill, + bruit, skin intact     LABS/STUDIES  --------------------------------------------------------------------------------              9.5    6.64  >-----------<  250      [07-11-18 @ 15:50]              31.4     136  |  92  |  51  ----------------------------<  354      [07-11-18 @ 17:19]  4.6   |  23  |  5.09        Ca     9.5     [07-11-18 @ 17:19]    TPro  7.6  /  Alb  3.3  /  TBili  0.4  /  DBili  x   /  AST  42  /  ALT  19  /  AlkPhos  94  [07-11-18 @ 14:00]    Creatinine Trend:  SCr 5.09 [07-11 @ 17:19]  SCr 4.64 [07-11 @ 14:00]  SCr 5.35 [07-06 @ 06:50]  SCr 3.69 [07-05 @ 07:25]  SCr 5.76 [07-04 @ 05:55]    Urinalysis - [09-07-17 @ 19:24]      Color Yellow / Appearance Clear / SG 1.020 / pH 5.0      Gluc Negative / Ketone Trace  / Bili Negative / Urobili Negative       Blood Large / Protein 100 / Leuk Est Small / Nitrite Negative      RBC >50 / WBC 6-10 / Hyaline  / Gran  / Sq Epi  / Non Sq Epi Few / Bacteria Moderate      Iron 41, TIBC 157, %sat --      [06-20-18 @ 06:00]  Ferritin 2578      [06-20-18 @ 06:00]  PTH 76.54 (Ca --)      [06-20-18 @ 06:00]   --  PTH -- (Ca 8.5)      [09-08-17 @ 09:29]   156  HbA1c 6.6      [06-20-18 @ 06:00]  TSH 5.93      [10-21-17 @ 09:04]  Lipid: chol 105, , HDL 35, LDL 35      [09-27-17 @ 07:40]    HBsAb <3.0      [06-19-18 @ 17:05]  HBsAg NEGATIVE      [06-19-18 @ 17:05]  HBcAb Nonreactive      [06-19-18 @ 17:05]  HCV 0.35, Nonreactive Hepatitis C AB  S/CO Ratio                        Interpretation  < 1.0                                     Non-Reactive  1.0 - 4.9                           Weakly-Reactive  > 5.0                                 Reactive  Non-Reactive: Aperson with a non-reactive HCV antibody  result is considered uninfected.  No further action is  needed unless recent infection is suspected.  In these  cases, consider repeat testing later to detect  seroconversion..  Weakly-Reactive: HCV antibody test is abnormal, HCV RNA  Qualitative test will follow.  Reactive: HCV antibody test is abnormal, HCV RNA  Qualitative test will follow.  Note: HCV antibody testing is performed on the [employer]   system.      [06-19-18 @ 17:05] [facility] DIVISION OF KIDNEY DISEASES AND HYPERTENSION -- INITIAL CONSULT NOTE  --------------------------------------------------------------------------------  HPI: 73 y/o M with CAD s/p 12-14 stents placed in 1990's, and BABAR x 5 placed in Sept 2017, CHF with EF 20%, DM, HTN, HLD, AF on coumadin, CVA , ESRD on HD (MW) COPD, bipolar disorder, dementia was transferred from [facility] for respiratory distress. Pt. had his last outpatient dialysis at [place] on Monday 7/10/18. Pts daughter at bedside and states he was extremely SOB when she was visiting today. Pt has been on dialysis since September of 2017. Pt does not know which nephrologist currently follows him at [facility]. Pt seen and examined in the ER. Pt on BiPAP in respiratory distress.     PAST HISTORY  --------------------------------------------------------------------------------  PAST MEDICAL & SURGICAL HISTORY:  CVA (cerebral vascular accident)  COPD (chronic obstructive pulmonary disease)  BPH (benign prostatic hyperplasia)  Bipolar affective disorder  CKD (chronic kidney disease)  Pancreatitis  Hypertension  Dyslipidemia  Diabetes mellitus  Coronary artery disease  HLD (hyperlipidemia)  Anemia  Acute renal failure  CKD (chronic kidney disease)  COPD (chronic obstructive pulmonary disease)  Fainting  Cardiac arrhythmia  Dizziness  Hydronephrosis  Cholecystitis  Bipolar 1 disorder  DM (diabetes mellitus)  HTN (hypertension)  Lumbar radiculopathy  Left heart failure  Reflux esophagitis  Coronary atherosclerosis  History of cardiac catheterization  No significant past surgical history    FAMILY HISTORY:  No pertinent family history in first degree relatives    PAST SOCIAL HISTORY:    ALLERGIES & MEDICATIONS  --------------------------------------------------------------------------------  Allergies    No Known Allergies    Intolerances    Standing Inpatient Medications    PRN Inpatient Medications    REVIEW OF SYSTEMS  --------------------------------------------------------------------------------  Gen: +weakness  Skin: No rashes  Head/Eyes/Ears/Mouth: No headache  Respiratory: + dyspnea  CV: No chest pain, +orthopnea  GI: No abdominal pain  : No increased frequency  MSK: No edema  Neuro: No dizziness  Heme: No bleeding    All other systems were reviewed and are negative, except as noted.    VITALS/PHYSICAL EXAM  --------------------------------------------------------------------------------  T(C): 37.1 (07-11-18 @ 16:13), Max: 37.1 (07-11-18 @ 16:13)  HR: 87 (07-11-18 @ 17:50) (79 - 87)  BP: 137/69 (07-11-18 @ 17:50) (135/85 - 157/74)  RR: 20 (07-11-18 @ 17:50) (20 - 23)  SpO2: 99% (07-11-18 @ 17:50) (98% - 100%)  Wt(kg): --    Physical Exam:  	Gen: Noted respiratory distress on BiPAP  	Pulm: rales B/L  	CV: RRR, S1S2; no rub  	Abd: +BS, soft, nontender/nondistended  	UE: Warm, no asterixis  	LE: Warm, no edema  	Psych: Normal affect and mood  	Skin: Warm, without rashes  	Vascular access: RUE AVF + thrill, + bruit, skin intact     LABS/STUDIES  --------------------------------------------------------------------------------              9.5    6.64  >-----------<  250      [07-11-18 @ 15:50]              31.4     136  |  92  |  51  ----------------------------<  354      [07-11-18 @ 17:19]  4.6   |  23  |  5.09        Ca     9.5     [07-11-18 @ 17:19]    TPro  7.6  /  Alb  3.3  /  TBili  0.4  /  DBili  x   /  AST  42  /  ALT  19  /  AlkPhos  94  [07-11-18 @ 14:00]    Creatinine Trend:  SCr 5.09 [07-11 @ 17:19]  SCr 4.64 [07-11 @ 14:00]  SCr 5.35 [07-06 @ 06:50]  SCr 3.69 [07-05 @ 07:25]  SCr 5.76 [07-04 @ 05:55]    HBsAg NEGATIVE      [06-19-18 @ 17:05]  HBcAb Nonreactive      [06-19-18 @ 17:05]  HCV 0.35, Nonreactive Hepatitis C AB  S/CO Ratio                        Interpretation  < 1.0                                     Non-Reactive  1.0 - 4.9                           Weakly-Reactive  > 5.0                                 Reactive  Non-Reactive: Aperson with a non-reactive HCV antibody  result is considered uninfected.  No further action is  needed unless recent infection is suspected.  In these  cases, consider repeat testing later to detect  seroconversion..  Weakly-Reactive: HCV antibody test is abnormal, HCV RNA  Qualitative test will follow.  Reactive: HCV antibody test is abnormal, HCV RNA  Qualitative test will follow.  Note: HCV antibody testing is performed on the [employer] system.      [06-19-18 @ 17:05]

## 2018-07-11 NOTE — ED PROCEDURE NOTE - PROCEDURE ADDITIONAL DETAILS
ED focused limited thoracic US [id number]    Focused ED ultrasound of the lungs and pleura:  indic SOB    Findings: Normal lung sliding bilaterally  (+)signs of interstitial syndrome - bilat anterior b-line  bilat mod-large pleural effusions  No pneumonia visualized    Impression: Abnormal focused lung ultrasound - bilat anterior B-lines - bilat mod-large pleural effusions.  Procedure note and images placed in chart

## 2018-07-12 DIAGNOSIS — I10 ESSENTIAL (PRIMARY) HYPERTENSION: ICD-10-CM

## 2018-07-12 DIAGNOSIS — R74.8 ABNORMAL LEVELS OF OTHER SERUM ENZYMES: ICD-10-CM

## 2018-07-12 DIAGNOSIS — R73.9 HYPERGLYCEMIA, UNSPECIFIED: ICD-10-CM

## 2018-07-12 DIAGNOSIS — J96.01 ACUTE RESPIRATORY FAILURE WITH HYPOXIA: ICD-10-CM

## 2018-07-12 DIAGNOSIS — E78.5 HYPERLIPIDEMIA, UNSPECIFIED: ICD-10-CM

## 2018-07-12 DIAGNOSIS — Z29.9 ENCOUNTER FOR PROPHYLACTIC MEASURES, UNSPECIFIED: ICD-10-CM

## 2018-07-12 LAB
BASE EXCESS BLDV CALC-SCNC: 3.7 MMOL/L — SIGNIFICANT CHANGE UP
BASE EXCESS BLDV CALC-SCNC: 7 MMOL/L — SIGNIFICANT CHANGE UP
BLOOD GAS VENOUS - CREATININE: 3.04 MG/DL — HIGH (ref 0.5–1.3)
BLOOD GAS VENOUS - CREATININE: 3.52 MG/DL — HIGH (ref 0.5–1.3)
BUN SERPL-MCNC: 26 MG/DL — HIGH (ref 7–23)
CALCIUM SERPL-MCNC: 10 MG/DL — SIGNIFICANT CHANGE UP (ref 8.4–10.5)
CHLORIDE BLDV-SCNC: 102 MMOL/L — SIGNIFICANT CHANGE UP (ref 96–108)
CHLORIDE BLDV-SCNC: 102 MMOL/L — SIGNIFICANT CHANGE UP (ref 96–108)
CHLORIDE SERPL-SCNC: 94 MMOL/L — LOW (ref 98–107)
CHOLEST SERPL-MCNC: 170 MG/DL — SIGNIFICANT CHANGE UP (ref 120–199)
CK MB BLD-MCNC: 9 NG/ML — HIGH (ref 1–6.6)
CK SERPL-CCNC: 79 U/L — SIGNIFICANT CHANGE UP (ref 30–200)
CO2 SERPL-SCNC: 26 MMOL/L — SIGNIFICANT CHANGE UP (ref 22–31)
CREAT SERPL-MCNC: 3.31 MG/DL — HIGH (ref 0.5–1.3)
GAS PNL BLDV: 138 MMOL/L — SIGNIFICANT CHANGE UP (ref 136–146)
GAS PNL BLDV: 139 MMOL/L — SIGNIFICANT CHANGE UP (ref 136–146)
GLUCOSE BLDC GLUCOMTR-MCNC: 140 MG/DL — HIGH (ref 70–99)
GLUCOSE BLDC GLUCOMTR-MCNC: 150 MG/DL — HIGH (ref 70–99)
GLUCOSE BLDC GLUCOMTR-MCNC: 169 MG/DL — HIGH (ref 70–99)
GLUCOSE BLDC GLUCOMTR-MCNC: 204 MG/DL — HIGH (ref 70–99)
GLUCOSE BLDV-MCNC: 132 — HIGH (ref 70–99)
GLUCOSE BLDV-MCNC: 185 — HIGH (ref 70–99)
GLUCOSE SERPL-MCNC: 137 MG/DL — HIGH (ref 70–99)
HBA1C BLD-MCNC: 6.4 % — HIGH (ref 4–5.6)
HCO3 BLDV-SCNC: 27 MMOL/L — SIGNIFICANT CHANGE UP (ref 20–27)
HCO3 BLDV-SCNC: 30 MMOL/L — HIGH (ref 20–27)
HCT VFR BLD CALC: 28.9 % — LOW (ref 39–50)
HCT VFR BLDV CALC: 28.3 % — LOW (ref 39–51)
HCT VFR BLDV CALC: 30.4 % — LOW (ref 39–51)
HDLC SERPL-MCNC: 48 MG/DL — SIGNIFICANT CHANGE UP (ref 35–55)
HGB BLD-MCNC: 9 G/DL — LOW (ref 13–17)
HGB BLDV-MCNC: 9.1 G/DL — LOW (ref 13–17)
HGB BLDV-MCNC: 9.8 G/DL — LOW (ref 13–17)
LACTATE BLDV-MCNC: 1.2 MMOL/L — SIGNIFICANT CHANGE UP (ref 0.5–2)
LACTATE BLDV-MCNC: 5 MMOL/L — CRITICAL HIGH (ref 0.5–2)
LIPID PNL WITH DIRECT LDL SERPL: 107 MG/DL — SIGNIFICANT CHANGE UP
MAGNESIUM SERPL-MCNC: 2.2 MG/DL — SIGNIFICANT CHANGE UP (ref 1.6–2.6)
MCHC RBC-ENTMCNC: 29.4 PG — SIGNIFICANT CHANGE UP (ref 27–34)
MCHC RBC-ENTMCNC: 31.1 % — LOW (ref 32–36)
MCV RBC AUTO: 94.4 FL — SIGNIFICANT CHANGE UP (ref 80–100)
NRBC # FLD: 0 — SIGNIFICANT CHANGE UP
PCO2 BLDV: 42 MMHG — SIGNIFICANT CHANGE UP (ref 41–51)
PCO2 BLDV: 49 MMHG — SIGNIFICANT CHANGE UP (ref 41–51)
PH BLDV: 7.38 PH — SIGNIFICANT CHANGE UP (ref 7.32–7.43)
PH BLDV: 7.48 PH — HIGH (ref 7.32–7.43)
PLATELET # BLD AUTO: 286 K/UL — SIGNIFICANT CHANGE UP (ref 150–400)
PMV BLD: 9.7 FL — SIGNIFICANT CHANGE UP (ref 7–13)
PO2 BLDV: 29 MMHG — LOW (ref 35–40)
PO2 BLDV: 41 MMHG — HIGH (ref 35–40)
POTASSIUM BLDV-SCNC: 3.6 MMOL/L — SIGNIFICANT CHANGE UP (ref 3.4–4.5)
POTASSIUM BLDV-SCNC: 4.4 MMOL/L — SIGNIFICANT CHANGE UP (ref 3.4–4.5)
POTASSIUM SERPL-MCNC: 4.1 MMOL/L — SIGNIFICANT CHANGE UP (ref 3.5–5.3)
POTASSIUM SERPL-SCNC: 4.1 MMOL/L — SIGNIFICANT CHANGE UP (ref 3.5–5.3)
RBC # BLD: 3.06 M/UL — LOW (ref 4.2–5.8)
RBC # FLD: 15.5 % — HIGH (ref 10.3–14.5)
SAO2 % BLDV: 43.8 % — LOW (ref 60–85)
SAO2 % BLDV: 74.4 % — SIGNIFICANT CHANGE UP (ref 60–85)
SODIUM SERPL-SCNC: 140 MMOL/L — SIGNIFICANT CHANGE UP (ref 135–145)
SPECIMEN SOURCE: SIGNIFICANT CHANGE UP
SPECIMEN SOURCE: SIGNIFICANT CHANGE UP
TRIGL SERPL-MCNC: 101 MG/DL — SIGNIFICANT CHANGE UP (ref 10–149)
TROPONIN T, HIGH SENSITIVITY: 326 NG/L — CRITICAL HIGH (ref ?–14)
TSH SERPL-MCNC: 4.54 UIU/ML — HIGH (ref 0.27–4.2)
WBC # BLD: 7.17 K/UL — SIGNIFICANT CHANGE UP (ref 3.8–10.5)
WBC # FLD AUTO: 7.17 K/UL — SIGNIFICANT CHANGE UP (ref 3.8–10.5)

## 2018-07-12 PROCEDURE — 99222 1ST HOSP IP/OBS MODERATE 55: CPT | Mod: GC

## 2018-07-12 RX ORDER — HEPARIN SODIUM 5000 [USP'U]/ML
5000 INJECTION INTRAVENOUS; SUBCUTANEOUS EVERY 12 HOURS
Qty: 0 | Refills: 0 | Status: DISCONTINUED | OUTPATIENT
Start: 2018-07-12 | End: 2018-07-20

## 2018-07-12 RX ORDER — DEXTROSE 50 % IN WATER 50 %
25 SYRINGE (ML) INTRAVENOUS ONCE
Qty: 0 | Refills: 0 | Status: DISCONTINUED | OUTPATIENT
Start: 2018-07-12 | End: 2018-07-20

## 2018-07-12 RX ORDER — ASPIRIN/CALCIUM CARB/MAGNESIUM 324 MG
81 TABLET ORAL DAILY
Qty: 0 | Refills: 0 | Status: DISCONTINUED | OUTPATIENT
Start: 2018-07-12 | End: 2018-07-20

## 2018-07-12 RX ORDER — PANTOPRAZOLE SODIUM 20 MG/1
40 TABLET, DELAYED RELEASE ORAL
Qty: 0 | Refills: 0 | Status: DISCONTINUED | OUTPATIENT
Start: 2018-07-12 | End: 2018-07-20

## 2018-07-12 RX ORDER — TAMSULOSIN HYDROCHLORIDE 0.4 MG/1
0.4 CAPSULE ORAL AT BEDTIME
Qty: 0 | Refills: 0 | Status: DISCONTINUED | OUTPATIENT
Start: 2018-07-12 | End: 2018-07-20

## 2018-07-12 RX ORDER — INSULIN LISPRO 100/ML
VIAL (ML) SUBCUTANEOUS
Qty: 0 | Refills: 0 | Status: DISCONTINUED | OUTPATIENT
Start: 2018-07-12 | End: 2018-07-20

## 2018-07-12 RX ORDER — CLOPIDOGREL BISULFATE 75 MG/1
75 TABLET, FILM COATED ORAL DAILY
Qty: 0 | Refills: 0 | Status: DISCONTINUED | OUTPATIENT
Start: 2018-07-12 | End: 2018-07-20

## 2018-07-12 RX ORDER — DEXTROSE 50 % IN WATER 50 %
12.5 SYRINGE (ML) INTRAVENOUS ONCE
Qty: 0 | Refills: 0 | Status: DISCONTINUED | OUTPATIENT
Start: 2018-07-12 | End: 2018-07-20

## 2018-07-12 RX ORDER — IPRATROPIUM/ALBUTEROL SULFATE 18-103MCG
3 AEROSOL WITH ADAPTER (GRAM) INHALATION EVERY 6 HOURS
Qty: 0 | Refills: 0 | Status: DISCONTINUED | OUTPATIENT
Start: 2018-07-12 | End: 2018-07-20

## 2018-07-12 RX ORDER — SODIUM CHLORIDE 9 MG/ML
1000 INJECTION, SOLUTION INTRAVENOUS
Qty: 0 | Refills: 0 | Status: DISCONTINUED | OUTPATIENT
Start: 2018-07-12 | End: 2018-07-20

## 2018-07-12 RX ORDER — METOPROLOL TARTRATE 50 MG
25 TABLET ORAL
Qty: 0 | Refills: 0 | Status: DISCONTINUED | OUTPATIENT
Start: 2018-07-12 | End: 2018-07-20

## 2018-07-12 RX ORDER — AMIODARONE HYDROCHLORIDE 400 MG/1
200 TABLET ORAL DAILY
Qty: 0 | Refills: 0 | Status: DISCONTINUED | OUTPATIENT
Start: 2018-07-12 | End: 2018-07-20

## 2018-07-12 RX ORDER — INSULIN LISPRO 100/ML
VIAL (ML) SUBCUTANEOUS AT BEDTIME
Qty: 0 | Refills: 0 | Status: DISCONTINUED | OUTPATIENT
Start: 2018-07-12 | End: 2018-07-20

## 2018-07-12 RX ORDER — GLUCAGON INJECTION, SOLUTION 0.5 MG/.1ML
1 INJECTION, SOLUTION SUBCUTANEOUS ONCE
Qty: 0 | Refills: 0 | Status: DISCONTINUED | OUTPATIENT
Start: 2018-07-12 | End: 2018-07-20

## 2018-07-12 RX ORDER — DEXTROSE 50 % IN WATER 50 %
15 SYRINGE (ML) INTRAVENOUS ONCE
Qty: 0 | Refills: 0 | Status: DISCONTINUED | OUTPATIENT
Start: 2018-07-12 | End: 2018-07-20

## 2018-07-12 RX ORDER — ATORVASTATIN CALCIUM 80 MG/1
40 TABLET, FILM COATED ORAL AT BEDTIME
Qty: 0 | Refills: 0 | Status: DISCONTINUED | OUTPATIENT
Start: 2018-07-12 | End: 2018-07-20

## 2018-07-12 RX ADMIN — Medication 1 TABLET(S): at 13:01

## 2018-07-12 RX ADMIN — PANTOPRAZOLE SODIUM 40 MILLIGRAM(S): 20 TABLET, DELAYED RELEASE ORAL at 06:56

## 2018-07-12 RX ADMIN — ATORVASTATIN CALCIUM 40 MILLIGRAM(S): 80 TABLET, FILM COATED ORAL at 21:49

## 2018-07-12 RX ADMIN — Medication 1 DROP(S): at 06:56

## 2018-07-12 RX ADMIN — Medication 1 DROP(S): at 17:22

## 2018-07-12 RX ADMIN — Medication 81 MILLIGRAM(S): at 13:01

## 2018-07-12 RX ADMIN — Medication 5 MILLIGRAM(S): at 17:20

## 2018-07-12 RX ADMIN — Medication 1 DROP(S): at 23:21

## 2018-07-12 RX ADMIN — HEPARIN SODIUM 5000 UNIT(S): 5000 INJECTION INTRAVENOUS; SUBCUTANEOUS at 17:20

## 2018-07-12 RX ADMIN — CLOPIDOGREL BISULFATE 75 MILLIGRAM(S): 75 TABLET, FILM COATED ORAL at 13:01

## 2018-07-12 RX ADMIN — Medication 2: at 17:23

## 2018-07-12 RX ADMIN — Medication 25 MILLIGRAM(S): at 06:57

## 2018-07-12 RX ADMIN — AMIODARONE HYDROCHLORIDE 200 MILLIGRAM(S): 400 TABLET ORAL at 06:56

## 2018-07-12 RX ADMIN — HEPARIN SODIUM 5000 UNIT(S): 5000 INJECTION INTRAVENOUS; SUBCUTANEOUS at 06:57

## 2018-07-12 RX ADMIN — Medication 5 MILLIGRAM(S): at 06:58

## 2018-07-12 RX ADMIN — Medication 1 DROP(S): at 13:28

## 2018-07-12 RX ADMIN — Medication 25 MILLIGRAM(S): at 17:20

## 2018-07-12 RX ADMIN — TAMSULOSIN HYDROCHLORIDE 0.4 MILLIGRAM(S): 0.4 CAPSULE ORAL at 21:49

## 2018-07-12 NOTE — H&P ADULT - GASTROINTESTINAL DETAILS
bowel sounds normal/no rebound tenderness/no rigidity/no distention/normal/nontender/no guarding/soft

## 2018-07-12 NOTE — H&P ADULT - COMMENTS
Patient unable to provide any ROS as patient is AAOx1 and is easily arousable to verbal stimuli but is somnolent

## 2018-07-12 NOTE — H&P ADULT - HISTORY OF PRESENT ILLNESS
*HPI and ROS was obtained from ED provider noted and Nephrology note as patient is AAOx1 at baseline and responding to verbal stimuli but somnolent*     73 y/o M with PMH of CAD(s/p 12-14 stents placed in 1990's, and BABAR x 5 placed in Sept 2017), ESRD(on HD M/W/F), Bipolar disorder, COPD, BPH, CVA, DM, HLD, HTN, Afib(off coumadin 2/2 to bleed last admission), CHF(with EF of 20%) presented with the complaint of CHAMPION and SOB. As per renal note, "Patient was transferred from [facility] for respiratory distress. Pt last outpatient dialysis at [place] was on Monday. Pts daughter at bedside and states he was extremely SOB when she was visiting today. Pt was suppose to have his maintenance HD tonight however he was too unstable. Pt has been on dialysis since September of 2017. Pt does not know which nephrologist currently follows him at [facility]". As per ED provider note, Patient was brought in by ambulance for CHAMPION since this morning. Reports gradually worsening since waking with the dyspnea with associated substernal chest pain in the morning that has since resolved. Recently admitted for similar symptoms d/t missed HD"     On ED admission EKG revealed NSR at a rate of 79 with possible LAE and QTc of 497, CE x 1: Trop: 332->367, H&H: 9.5/31.4, BUN/Cr: 51/5.09, Gluc: 354, BNP: 23665. Prelim CXR: No emergent findings. In the ED patient received 40IVP of Lasix, Duonebs and IV Solumedrol and house nephrology was consulted for urgent HD.

## 2018-07-12 NOTE — CONSULT NOTE ADULT - SUBJECTIVE AND OBJECTIVE BOX
Reid Martinez MD  Interventional Cardiology / Advance Heart Failure and Cardiac Transplant Specialist  [place] Office : [address] [place] [address] [place] [address]  Tel:   [place] Office : [address] [facility] [place] [address]  Tel: [phone]  Cell : [phone]    HISTORY OF PRESENT ILLNESS:  73 y/o M with PMH of CAD(s/p 12-14 stents placed in 1990's, and BABAR x 5 placed in Sept 2017), ESRD(on HD M/W/F), Bipolar disorder, COPD, BPH, CVA, DM, HLD, HTN, Afib(off coumadin 2/2 to bleed last admission), CHF(with EF of 20%) presented with the complaint of CHAMPION and SOB. As per renal note, "Patient was transferred from [facility] for respiratory distress. Pt last outpatient dialysis at [facility] was on Monday. Pts daughter at bedside and states he was extremely SOB when she was visiting today. Pt was suppose to have his maintenance HD tonight however he was too unstable. Pt has been on dialysis since September of 2017. Pt does not know which nephrologist currently follows him at [facility]". As per ED provider note, Patient was brought in by ambulance for CHAMPION since this morning. Reports gradually worsening since waking with the dyspnea with associated substernal chest pain in the morning that has since resolved. Recently admitted for similar symptoms d/t missed HD"     On ED admission EKG revealed NSR at a rate of 79 with possible LAE and QTc of 497, CE x 1: Trop: 332->367, H&H: 9.5/31.4, BUN/Cr: 51/5.09, Gluc: 354, BNP: 69464. Prelim CXR: No emergent findings. In the ED patient received 40IVP of Lasix, Duonebs and IV Solumedrol and house nephrology was consulted for urgent HD.     PAST MEDICAL & SURGICAL HISTORY:  CVA (cerebral vascular accident)  COPD (chronic obstructive pulmonary disease)  BPH (benign prostatic hyperplasia)  Bipolar affective disorder  CKD (chronic kidney disease)  Pancreatitis  Hypertension  Dyslipidemia  Diabetes mellitus  Coronary artery disease  HLD (hyperlipidemia)  Anemia  Acute renal failure  CKD (chronic kidney disease)  COPD (chronic obstructive pulmonary disease)  Fainting  Cardiac arrhythmia  Dizziness  Hydronephrosis  Cholecystitis  Bipolar 1 disorder  DM (diabetes mellitus)  HTN (hypertension)  Lumbar radiculopathy  Left heart failure  Reflux esophagitis  Coronary atherosclerosis  History of cardiac catheterization  No significant past surgical history    	    MEDICATIONS:  amiodarone    Tablet 200 milliGRAM(s) Oral daily  aspirin  chewable 81 milliGRAM(s) Oral daily  clopidogrel Tablet 75 milliGRAM(s) Oral daily  heparin  Injectable 5000 Unit(s) SubCutaneous every 12 hours  metoprolol tartrate 25 milliGRAM(s) Oral two times a day  tamsulosin 0.4 milliGRAM(s) Oral at bedtime      ALBUTerol/ipratropium for Nebulization 3 milliLiter(s) Nebulizer every 6 hours PRN    busPIRone 5 milliGRAM(s) Oral two times a day    pantoprazole    Tablet 40 milliGRAM(s) Oral before breakfast    atorvastatin 40 milliGRAM(s) Oral at bedtime  dextrose 40% Gel 15 Gram(s) Oral once PRN  dextrose 50% Injectable 12.5 Gram(s) IV Push once  dextrose 50% Injectable 25 Gram(s) IV Push once  dextrose 50% Injectable 25 Gram(s) IV Push once  glucagon  Injectable 1 milliGRAM(s) IntraMuscular once PRN  insulin lispro (HumaLOG) corrective regimen sliding scale   SubCutaneous three times a day before meals  insulin lispro (HumaLOG) corrective regimen sliding scale   SubCutaneous at bedtime    artificial  tears Solution 1 Drop(s) Both EYES four times a day  dextrose 5%. 1000 milliLiter(s) IV Continuous <Continuous>  multivitamin 1 Tablet(s) Oral daily      FAMILY HISTORY:  No pertinent family history in first degree relatives        Allergies    No Known Allergies    Intolerances    	      PHYSICAL EXAM:  T(C): 37.7 (07-12-18 @ 12:51), Max: 37.7 (07-12-18 @ 12:51)  HR: 73 (07-12-18 @ 15:10) (73 - 99)  BP: 109/57 (07-12-18 @ 12:51) (105/59 - 157/74)  RR: 16 (07-12-18 @ 12:51) (16 - 20)  SpO2: 97% (07-12-18 @ 15:10) (94% - 100%)  Wt(kg): --  I&O's Summary    11 Jul 2018 07:01  -  12 Jul 2018 07:00  --------------------------------------------------------  IN: 400 mL / OUT: 2500 mL / NET: -2100 mL    12 Jul 2018 07:01  -  12 Jul 2018 16:07  --------------------------------------------------------  IN: 480 mL / OUT: 0 mL / NET: 480 mL        Appearance: Normal	  HEENT:   Normal oral mucosa, PERRL, EOMI	  Cardiovascular: Normal S1 S2, No JVD, No murmurs, No edema  Respiratory: bibasilar crackles  Gastrointestinal:  Soft, Non-tender, + BS	  Extremities: Normal range of motion, No clubbing, cyanosis or edema    LABS:	 	    CARDIAC MARKERS:      CKMB: 9.00 ng/mL (07-12 @ 00:40)  CKMB: 11.04 ng/mL (07-11 @ 17:19)    CKMB Relative Index: Test not performed (07-11 @ 17:19)                            9.0    7.17  )-----------( 286      ( 12 Jul 2018 07:59 )             28.9     07-12    140  |  94<L>  |  26<H>  ----------------------------<  137<H>  4.1   |  26  |  3.31<H>    Ca    10.0      12 Jul 2018 07:59  Mg     2.2     07-12    TPro  7.6  /  Alb  3.3  /  TBili  0.4  /  DBili  x   /  AST  42<H>  /  ALT  19  /  AlkPhos  94  07-11    proBNP:   Lipid Profile:   HgA1c: Hemoglobin A1C, Whole Blood: 6.4 % (07-12 @ 07:59)    TSH: Thyroid Stimulating Hormone, Serum: 4.54 uIU/mL (07-12 @ 07:59)      ASSESSMENT/PLAN:

## 2018-07-12 NOTE — CONSULT NOTE ADULT - ASSESSMENT
Echo - 6/18- EF moderately decreased      a/p     1) Acute on chronic systolic CHF - clinically improving , continue volume removal with HD, recent echo shows mod LV dysfunction  mild troponin elevation likely sec to ESRD and CHF. cont with BB . no acei or arb due to borderline low bp  2) CAD s/p PCI - on ASA and plavix ,   3) ESRD on HD  4)  Afib - h/o GI bleed and anemia off coumadin last admission

## 2018-07-12 NOTE — H&P ADULT - RS GEN PE MLT RESP DETAILS PC
no intercostal retractions/normal/no rhonchi/rales/no wheezes/respirations non-labored/airway patent/no chest wall tenderness

## 2018-07-12 NOTE — H&P ADULT - PROBLEM SELECTOR PLAN 4
FS on admission noted to be 354 and prior sugars from last admission ranged between 190-200s  Will placed patient on sliding scale(humalog)  FS TID and at bedtime, HgbA1C in am

## 2018-07-12 NOTE — H&P ADULT - ASSESSMENT
71 y/o M with PMH of CAD(s/p 12-14 stents placed in 1990's, and BABAR x 5 placed in Sept 2017), ESRD(on HD M/W/F), Bipolar disorder, COPD, BPH, CVA, DM, HLD, HTN, Afib(off coumadin 2/2 to bleed last admission), CHF(with EF of 20%) presented with the complaint of CHAMPION and SOB. R/o CHF vs fluid overload from ESRD.    +CHAMPION and SOB-s/p IV Lasix 40mg and currently getting dialysis

## 2018-07-12 NOTE — H&P ADULT - NSHPLABSRESULTS_GEN_ALL_CORE
9.5    6.64  )-----------( 250      ( 11 Jul 2018 15:50 )             31.4     07-11    136  |  92<L>  |  51<H>  ----------------------------<  354<H>  4.6   |  23  |  5.09<H>    Ca    9.5      11 Jul 2018 17:19    TPro  7.6  /  Alb  3.3  /  TBili  0.4  /  DBili  x   /  AST  42<H>  /  ALT  19  /  AlkPhos  94  07-11    Prelim CXR: No emergent findings    EKG: NSR at a rate of 79 with possible LAE and QTc of 497

## 2018-07-12 NOTE — H&P ADULT - PROBLEM SELECTOR PLAN 3
Patient gets dialysis M/W/F  House Renal following and is to get dialysis tonight   Renal diet ordered   Avoid nephrotoxic medications

## 2018-07-12 NOTE — H&P ADULT - PROBLEM SELECTOR PLAN 1
Patient s/p IV Lasix 40mg, and BIPAP in the ED. Likely 2/2 to acute pulmonary edema from underline ESRD(last HD was on Monday)   House Renal follow and their recommendation noted   Patient to have urgent HD tonight  Continue with BIPAP and try to wean to 3L NC after dialysis

## 2018-07-12 NOTE — H&P ADULT - PROBLEM SELECTOR PLAN 2
HsT on admission noted to be 332->367 in the setting of demand ischemia. Will check 1 more set of cardiac enzyme to trend   Will monitor on telemetry, serial EKG and CE prn for any episodes of chest pain   Cardiology consult with Dr. Martinez to be called in am  Continue with Aspirin 81mg and Plavix 75mg daily

## 2018-07-13 LAB
BUN SERPL-MCNC: 55 MG/DL — HIGH (ref 7–23)
CALCIUM SERPL-MCNC: 9.6 MG/DL — SIGNIFICANT CHANGE UP (ref 8.4–10.5)
CHLORIDE SERPL-SCNC: 96 MMOL/L — LOW (ref 98–107)
CO2 SERPL-SCNC: 29 MMOL/L — SIGNIFICANT CHANGE UP (ref 22–31)
CREAT SERPL-MCNC: 4.83 MG/DL — HIGH (ref 0.5–1.3)
GLUCOSE BLDC GLUCOMTR-MCNC: 113 MG/DL — HIGH (ref 70–99)
GLUCOSE BLDC GLUCOMTR-MCNC: 137 MG/DL — HIGH (ref 70–99)
GLUCOSE BLDC GLUCOMTR-MCNC: 142 MG/DL — HIGH (ref 70–99)
GLUCOSE BLDC GLUCOMTR-MCNC: 193 MG/DL — HIGH (ref 70–99)
GLUCOSE BLDC GLUCOMTR-MCNC: 235 MG/DL — HIGH (ref 70–99)
GLUCOSE SERPL-MCNC: 132 MG/DL — HIGH (ref 70–99)
HCT VFR BLD CALC: 26.4 % — LOW (ref 39–50)
HGB BLD-MCNC: 8 G/DL — LOW (ref 13–17)
MCHC RBC-ENTMCNC: 28.9 PG — SIGNIFICANT CHANGE UP (ref 27–34)
MCHC RBC-ENTMCNC: 30.3 % — LOW (ref 32–36)
MCV RBC AUTO: 95.3 FL — SIGNIFICANT CHANGE UP (ref 80–100)
NRBC # FLD: 0 — SIGNIFICANT CHANGE UP
PLATELET # BLD AUTO: 234 K/UL — SIGNIFICANT CHANGE UP (ref 150–400)
PMV BLD: 9.8 FL — SIGNIFICANT CHANGE UP (ref 7–13)
POTASSIUM SERPL-MCNC: 3.6 MMOL/L — SIGNIFICANT CHANGE UP (ref 3.5–5.3)
POTASSIUM SERPL-SCNC: 3.6 MMOL/L — SIGNIFICANT CHANGE UP (ref 3.5–5.3)
RBC # BLD: 2.77 M/UL — LOW (ref 4.2–5.8)
RBC # FLD: 15.6 % — HIGH (ref 10.3–14.5)
SODIUM SERPL-SCNC: 140 MMOL/L — SIGNIFICANT CHANGE UP (ref 135–145)
SPECIMEN SOURCE: SIGNIFICANT CHANGE UP
SPECIMEN SOURCE: SIGNIFICANT CHANGE UP
WBC # BLD: 7.47 K/UL — SIGNIFICANT CHANGE UP (ref 3.8–10.5)
WBC # FLD AUTO: 7.47 K/UL — SIGNIFICANT CHANGE UP (ref 3.8–10.5)

## 2018-07-13 PROCEDURE — 90935 HEMODIALYSIS ONE EVALUATION: CPT

## 2018-07-13 RX ADMIN — Medication 1 DROP(S): at 06:08

## 2018-07-13 RX ADMIN — Medication 2: at 13:37

## 2018-07-13 RX ADMIN — HEPARIN SODIUM 5000 UNIT(S): 5000 INJECTION INTRAVENOUS; SUBCUTANEOUS at 17:08

## 2018-07-13 RX ADMIN — Medication 5 MILLIGRAM(S): at 17:08

## 2018-07-13 RX ADMIN — Medication 81 MILLIGRAM(S): at 11:08

## 2018-07-13 RX ADMIN — AMIODARONE HYDROCHLORIDE 200 MILLIGRAM(S): 400 TABLET ORAL at 11:08

## 2018-07-13 RX ADMIN — TAMSULOSIN HYDROCHLORIDE 0.4 MILLIGRAM(S): 0.4 CAPSULE ORAL at 22:08

## 2018-07-13 RX ADMIN — Medication 1 DROP(S): at 17:09

## 2018-07-13 RX ADMIN — Medication 1 DROP(S): at 11:08

## 2018-07-13 RX ADMIN — Medication 1 TABLET(S): at 11:08

## 2018-07-13 RX ADMIN — ATORVASTATIN CALCIUM 40 MILLIGRAM(S): 80 TABLET, FILM COATED ORAL at 22:08

## 2018-07-13 RX ADMIN — Medication 25 MILLIGRAM(S): at 17:08

## 2018-07-13 RX ADMIN — Medication 5 MILLIGRAM(S): at 06:08

## 2018-07-13 RX ADMIN — PANTOPRAZOLE SODIUM 40 MILLIGRAM(S): 20 TABLET, DELAYED RELEASE ORAL at 06:08

## 2018-07-13 RX ADMIN — HEPARIN SODIUM 5000 UNIT(S): 5000 INJECTION INTRAVENOUS; SUBCUTANEOUS at 06:08

## 2018-07-13 RX ADMIN — CLOPIDOGREL BISULFATE 75 MILLIGRAM(S): 75 TABLET, FILM COATED ORAL at 11:08

## 2018-07-14 LAB
BUN SERPL-MCNC: 45 MG/DL — HIGH (ref 7–23)
CALCIUM SERPL-MCNC: 9.6 MG/DL — SIGNIFICANT CHANGE UP (ref 8.4–10.5)
CHLORIDE SERPL-SCNC: 96 MMOL/L — LOW (ref 98–107)
CO2 SERPL-SCNC: 28 MMOL/L — SIGNIFICANT CHANGE UP (ref 22–31)
CREAT SERPL-MCNC: 4.3 MG/DL — HIGH (ref 0.5–1.3)
GLUCOSE BLDC GLUCOMTR-MCNC: 128 MG/DL — HIGH (ref 70–99)
GLUCOSE BLDC GLUCOMTR-MCNC: 149 MG/DL — HIGH (ref 70–99)
GLUCOSE BLDC GLUCOMTR-MCNC: 182 MG/DL — HIGH (ref 70–99)
GLUCOSE BLDC GLUCOMTR-MCNC: 231 MG/DL — HIGH (ref 70–99)
GLUCOSE SERPL-MCNC: 128 MG/DL — HIGH (ref 70–99)
HCT VFR BLD CALC: 28.3 % — LOW (ref 39–50)
HGB BLD-MCNC: 8.6 G/DL — LOW (ref 13–17)
MAGNESIUM SERPL-MCNC: 2.1 MG/DL — SIGNIFICANT CHANGE UP (ref 1.6–2.6)
MCHC RBC-ENTMCNC: 29.5 PG — SIGNIFICANT CHANGE UP (ref 27–34)
MCHC RBC-ENTMCNC: 30.4 % — LOW (ref 32–36)
MCV RBC AUTO: 96.9 FL — SIGNIFICANT CHANGE UP (ref 80–100)
NRBC # FLD: 0 — SIGNIFICANT CHANGE UP
NT-PROBNP SERPL-SCNC: SIGNIFICANT CHANGE UP PG/ML
PLATELET # BLD AUTO: 268 K/UL — SIGNIFICANT CHANGE UP (ref 150–400)
PMV BLD: 10 FL — SIGNIFICANT CHANGE UP (ref 7–13)
POTASSIUM SERPL-MCNC: 3.4 MMOL/L — LOW (ref 3.5–5.3)
POTASSIUM SERPL-SCNC: 3.4 MMOL/L — LOW (ref 3.5–5.3)
RBC # BLD: 2.92 M/UL — LOW (ref 4.2–5.8)
RBC # FLD: 15.7 % — HIGH (ref 10.3–14.5)
SODIUM SERPL-SCNC: 139 MMOL/L — SIGNIFICANT CHANGE UP (ref 135–145)
WBC # BLD: 7.8 K/UL — SIGNIFICANT CHANGE UP (ref 3.8–10.5)
WBC # FLD AUTO: 7.8 K/UL — SIGNIFICANT CHANGE UP (ref 3.8–10.5)

## 2018-07-14 RX ORDER — LANOLIN ALCOHOL/MO/W.PET/CERES
3 CREAM (GRAM) TOPICAL AT BEDTIME
Qty: 0 | Refills: 0 | Status: DISCONTINUED | OUTPATIENT
Start: 2018-07-14 | End: 2018-07-20

## 2018-07-14 RX ADMIN — PANTOPRAZOLE SODIUM 40 MILLIGRAM(S): 20 TABLET, DELAYED RELEASE ORAL at 05:22

## 2018-07-14 RX ADMIN — Medication 25 MILLIGRAM(S): at 17:28

## 2018-07-14 RX ADMIN — TAMSULOSIN HYDROCHLORIDE 0.4 MILLIGRAM(S): 0.4 CAPSULE ORAL at 20:28

## 2018-07-14 RX ADMIN — AMIODARONE HYDROCHLORIDE 200 MILLIGRAM(S): 400 TABLET ORAL at 05:22

## 2018-07-14 RX ADMIN — Medication 1 TABLET(S): at 11:21

## 2018-07-14 RX ADMIN — Medication 25 MILLIGRAM(S): at 05:22

## 2018-07-14 RX ADMIN — Medication 5 MILLIGRAM(S): at 17:28

## 2018-07-14 RX ADMIN — CLOPIDOGREL BISULFATE 75 MILLIGRAM(S): 75 TABLET, FILM COATED ORAL at 11:21

## 2018-07-14 RX ADMIN — HEPARIN SODIUM 5000 UNIT(S): 5000 INJECTION INTRAVENOUS; SUBCUTANEOUS at 05:22

## 2018-07-14 RX ADMIN — Medication 3 MILLIGRAM(S): at 20:28

## 2018-07-14 RX ADMIN — Medication 5 MILLIGRAM(S): at 05:22

## 2018-07-14 RX ADMIN — Medication 1 DROP(S): at 17:28

## 2018-07-14 RX ADMIN — Medication 81 MILLIGRAM(S): at 11:21

## 2018-07-14 RX ADMIN — Medication 1 DROP(S): at 11:21

## 2018-07-14 RX ADMIN — HEPARIN SODIUM 5000 UNIT(S): 5000 INJECTION INTRAVENOUS; SUBCUTANEOUS at 17:28

## 2018-07-14 RX ADMIN — ATORVASTATIN CALCIUM 40 MILLIGRAM(S): 80 TABLET, FILM COATED ORAL at 20:28

## 2018-07-14 RX ADMIN — Medication 1: at 13:37

## 2018-07-14 RX ADMIN — Medication 1 DROP(S): at 05:22

## 2018-07-15 LAB
BUN SERPL-MCNC: 64 MG/DL — HIGH (ref 7–23)
CALCIUM SERPL-MCNC: 9.6 MG/DL — SIGNIFICANT CHANGE UP (ref 8.4–10.5)
CHLORIDE SERPL-SCNC: 96 MMOL/L — LOW (ref 98–107)
CO2 SERPL-SCNC: 26 MMOL/L — SIGNIFICANT CHANGE UP (ref 22–31)
CREAT SERPL-MCNC: 5.8 MG/DL — HIGH (ref 0.5–1.3)
GLUCOSE BLDC GLUCOMTR-MCNC: 127 MG/DL — HIGH (ref 70–99)
GLUCOSE BLDC GLUCOMTR-MCNC: 130 MG/DL — HIGH (ref 70–99)
GLUCOSE BLDC GLUCOMTR-MCNC: 186 MG/DL — HIGH (ref 70–99)
GLUCOSE BLDC GLUCOMTR-MCNC: 230 MG/DL — HIGH (ref 70–99)
GLUCOSE SERPL-MCNC: 131 MG/DL — HIGH (ref 70–99)
HCT VFR BLD CALC: 30.8 % — LOW (ref 39–50)
HGB BLD-MCNC: 9.4 G/DL — LOW (ref 13–17)
MAGNESIUM SERPL-MCNC: 2.3 MG/DL — SIGNIFICANT CHANGE UP (ref 1.6–2.6)
MCHC RBC-ENTMCNC: 29.3 PG — SIGNIFICANT CHANGE UP (ref 27–34)
MCHC RBC-ENTMCNC: 30.5 % — LOW (ref 32–36)
MCV RBC AUTO: 96 FL — SIGNIFICANT CHANGE UP (ref 80–100)
NRBC # FLD: 0 — SIGNIFICANT CHANGE UP
PLATELET # BLD AUTO: 274 K/UL — SIGNIFICANT CHANGE UP (ref 150–400)
PMV BLD: 9.6 FL — SIGNIFICANT CHANGE UP (ref 7–13)
POTASSIUM SERPL-MCNC: 4 MMOL/L — SIGNIFICANT CHANGE UP (ref 3.5–5.3)
POTASSIUM SERPL-SCNC: 4 MMOL/L — SIGNIFICANT CHANGE UP (ref 3.5–5.3)
RBC # BLD: 3.21 M/UL — LOW (ref 4.2–5.8)
RBC # FLD: 15.9 % — HIGH (ref 10.3–14.5)
SODIUM SERPL-SCNC: 142 MMOL/L — SIGNIFICANT CHANGE UP (ref 135–145)
WBC # BLD: 8.1 K/UL — SIGNIFICANT CHANGE UP (ref 3.8–10.5)
WBC # FLD AUTO: 8.1 K/UL — SIGNIFICANT CHANGE UP (ref 3.8–10.5)

## 2018-07-15 RX ADMIN — TAMSULOSIN HYDROCHLORIDE 0.4 MILLIGRAM(S): 0.4 CAPSULE ORAL at 21:55

## 2018-07-15 RX ADMIN — ATORVASTATIN CALCIUM 40 MILLIGRAM(S): 80 TABLET, FILM COATED ORAL at 21:55

## 2018-07-15 RX ADMIN — Medication 1 DROP(S): at 12:48

## 2018-07-15 RX ADMIN — Medication 1 TABLET(S): at 12:48

## 2018-07-15 RX ADMIN — Medication 25 MILLIGRAM(S): at 17:28

## 2018-07-15 RX ADMIN — CLOPIDOGREL BISULFATE 75 MILLIGRAM(S): 75 TABLET, FILM COATED ORAL at 12:48

## 2018-07-15 RX ADMIN — HEPARIN SODIUM 5000 UNIT(S): 5000 INJECTION INTRAVENOUS; SUBCUTANEOUS at 17:28

## 2018-07-15 RX ADMIN — AMIODARONE HYDROCHLORIDE 200 MILLIGRAM(S): 400 TABLET ORAL at 05:30

## 2018-07-15 RX ADMIN — Medication 5 MILLIGRAM(S): at 17:28

## 2018-07-15 RX ADMIN — HEPARIN SODIUM 5000 UNIT(S): 5000 INJECTION INTRAVENOUS; SUBCUTANEOUS at 05:30

## 2018-07-15 RX ADMIN — Medication 81 MILLIGRAM(S): at 12:48

## 2018-07-15 RX ADMIN — PANTOPRAZOLE SODIUM 40 MILLIGRAM(S): 20 TABLET, DELAYED RELEASE ORAL at 05:30

## 2018-07-15 RX ADMIN — Medication 1 DROP(S): at 05:30

## 2018-07-15 RX ADMIN — Medication 2: at 18:18

## 2018-07-15 RX ADMIN — Medication 25 MILLIGRAM(S): at 05:30

## 2018-07-15 RX ADMIN — Medication 1: at 12:48

## 2018-07-15 RX ADMIN — Medication 5 MILLIGRAM(S): at 05:30

## 2018-07-15 RX ADMIN — Medication 1 DROP(S): at 17:29

## 2018-07-15 NOTE — DIETITIAN INITIAL EVALUATION ADULT. - FEEDING SKILL
At skilled nursing facility, patient was with some ability to feed himself, however RN notes that patient presently requires near full assistance.

## 2018-07-15 NOTE — DIETITIAN INITIAL EVALUATION ADULT. - OTHER INFO
Patient has past medical history inclusive of ESRD (on hemodialysis), Patient has past medical history inclusive of ESRD (on hemodialysis), COPD, CAD (s/p placement of stents), hypertension, and bipolar disorder.  He has been admitted to [facility] out of concern for respiratory distress.  RD met with patient, however he remained asleep during time of encounter.  Therefore, RD secured telephone contact with daughter of patient (Anisa) via telephone number [phone].  Daughter remarks that patient was maintained upon an oral dietary regimen consisting of mechanical soft foods and thin fluids at [facility].  Moreover, she notes that patient was with some dislike of institutional foods and thus, family was providing patient with many of his meals.  Daughter and family are well-aware of pertinent dietary restrictions including those of sodium, potassium, and phosphorus.  Patient has no known food allergies.  Daughter states that patient's highest, usual body weight equated to approximately 106.8 kg prior to September, 2017, after which his weight status progressively declined.  She attributes such a decline mainly toward his dislike of hospital/nursing home foods and possibly toward the effects of his multiple medical conditions.  RD encouraged adequate consumption of nutrient-/protein-dense foods in accordance with his preferences.  Daughter verbalized excellent comprehension. Patient has past medical history inclusive of ESRD (on hemodialysis), COPD, CAD (s/p placement of stents), hypertension, and bipolar disorder.  He has been admitted to [facility] out of concern for respiratory distress.  RD met with patient, however he remained asleep during time of encounters.  Therefore, RD secured telephone contact with daughter of patient (Anisa) via telephone number [phone].  Daughter remarks that patient was maintained upon an oral dietary regimen consisting of mechanical soft foods and thin fluids at [facility].  Moreover, she notes that patient was with some dislike of institutional foods and thus, family was providing patient with many of his meals.  Daughter and family are well-aware of pertinent dietary restrictions including those of sodium, potassium, and phosphorus.  Patient has no known food allergies.  Daughter states that patient's highest, usual body weight equated to approximately 106.8 kg prior to September, 2017, after which his weight status progressively declined.  She attributes such a decline mainly toward his dislike of hospital/nursing home foods and possibly toward the effects of multiple medical conditions.  Relative to current body weight, overall weight trend is indicative of overall 37.7% weight decline, however daughter remarks that patient may have gained a modest amount of weight within past several weeks.  RD encouraged adequate consumption of nutrient-/protein-dense foods in accordance with his preferences.  Daughter verbalized excellent comprehension.  RD states that patient has been with relatively good appetite/tolerance of pureed foods/honey thickened fluids.  No recent episodes of emesis, diarrhea, or edema noted.  Weight trend:  (7/13) 60.8 kg, (7/14) 66.4 kg, (7/15) 66.5 kg. Patient has past medical history inclusive of ESRD (on hemodialysis), COPD, CAD (s/p placement of stents), hypertension, and bipolar disorder.  He has been admitted to [facility] out of concern for respiratory distress.  RD met with patient, however he remained asleep during time of encounters.  Therefore, RD secured telephone contact with daughter of patient (Anisa) via telephone number [phone].  Daughter remarks that patient was maintained upon an oral dietary regimen consisting of mechanical soft foods and thin fluids at [facility].  Moreover, she notes that patient was with some dislike of institutional foods and thus, family was providing patient with many of his meals.  Daughter and family are well-aware of pertinent dietary restrictions including those of sodium, potassium, and phosphorus.  Patient has no known food allergies.  Daughter states that patient's highest, usual body weight equated to approximately 106.8 kg prior to September, 2017, after which his weight status progressively declined.  She attributes such a decline mainly toward his dislike of hospital/nursing home foods and possibly toward the effects of multiple medical conditions.  Relative to current body weight, overall weight trend is indicative of overall 37.7% weight decline, however daughter remarks that patient may have gained a modest amount of weight within past several weeks.  RD encouraged adequate consumption of nutrient-/protein-dense foods in accordance with his preferences.  Daughter verbalized excellent comprehension.  RN states that patient has been with relatively good appetite/tolerance of pureed foods/honey thickened fluids.  No recent episodes of emesis, diarrhea, or edema noted.  Weight trend:  (7/13) 60.8 kg, (7/14) 66.4 kg, (7/15) 66.5 kg.  As per RN notation, patient is with incontinent associated erythema of right/left buttocks.

## 2018-07-15 NOTE — DIETITIAN INITIAL EVALUATION ADULT. - NUTRITION INTERVENTION
Medical Food Supplements/Collaboration and Referral of Nutrition Care Medical Food Supplements/Meals and Snack/Collaboration and Referral of Nutrition Care Meals and Snack

## 2018-07-15 NOTE — DIETITIAN INITIAL EVALUATION ADULT. - PERTINENT LABORATORY DATA
(7/15/18) BUN: 64 (elevated), Cr: 5.8 (elevated), Glucose: 131 (elevated), POCT Blood Glucose: 130 (elevated)

## 2018-07-15 NOTE — DIETITIAN INITIAL EVALUATION ADULT. - ENERGY NEEDS
Height = Height = 175.26 cm;  Weight obtained on 7/15 = 66.5 kg  BMI = 21.6 kg/m^2   Ideal body weight = 72.7 kg  Noted that during previous admission on 6/29/18, patient was diagnosed with severe malnutrition based upon fulfillment of "weight loss" and "sub-otpimal p.o. intake" criteria.

## 2018-07-15 NOTE — DIETITIAN INITIAL EVALUATION ADULT. - SIGNS/SYMPTOMS
as evidenced by prolonged need for consistency modified dietary regimen. as evidenced by elevations in BUN and creatinine levels.

## 2018-07-15 NOTE — DIETITIAN INITIAL EVALUATION ADULT. - ORAL INTAKE PTA
As per transfer records from [facility], patient was maintained upon a Kosher oral dietary regimen consisting of Mechanical Soft foods and thin fluids.

## 2018-07-15 NOTE — DIETITIAN INITIAL EVALUATION ADULT. - NS AS NUTRI INTERV COLLABORAT
Suggest evaluation by Speech Language Pathologist in an effort to clarify most appropriate diet consistency. Suggest evaluation by Speech Language Pathologist in an effort to clarify most appropriate diet consistency.  Please update dietary prescription in strict accordance with findings.

## 2018-07-15 NOTE — DIETITIAN INITIAL EVALUATION ADULT. - NS AS NUTRI INTERV MEALS SNACK
Adjust features of therapeutic dietary prescription in accordance with patient needs, tolerance, and medical findings.

## 2018-07-15 NOTE — DIETITIAN INITIAL EVALUATION ADULT. - NS AS NUTRI INTERV MEDICAL AND FOOD SUPPLEMENTS
Daughter of patient remarks that patient would manifest with diarrhea following ingestion of Nepro p.o. supplement.  In an effort to enhance protein intake of patient, consider daily provision of 30 ml Prosource Liquid Protein Supplement (yields 15 grams of protein).

## 2018-07-15 NOTE — DIETITIAN INITIAL EVALUATION ADULT. - DIET TYPE
1000ml/dysphagia 1, pureed, honey consistency fluid 1000ml/low sodium/caffeine free/dysphagia 1, pureed, honey consistency fluid

## 2018-07-16 LAB
BACTERIA BLD CULT: SIGNIFICANT CHANGE UP
BACTERIA BLD CULT: SIGNIFICANT CHANGE UP
BUN SERPL-MCNC: 77 MG/DL — HIGH (ref 7–23)
BUN SERPL-MCNC: 77 MG/DL — HIGH (ref 7–23)
CALCIUM SERPL-MCNC: 9.3 MG/DL — SIGNIFICANT CHANGE UP (ref 8.4–10.5)
CALCIUM SERPL-MCNC: 9.3 MG/DL — SIGNIFICANT CHANGE UP (ref 8.4–10.5)
CHLORIDE SERPL-SCNC: 96 MMOL/L — LOW (ref 98–107)
CHLORIDE SERPL-SCNC: 96 MMOL/L — LOW (ref 98–107)
CO2 SERPL-SCNC: 23 MMOL/L — SIGNIFICANT CHANGE UP (ref 22–31)
CO2 SERPL-SCNC: 23 MMOL/L — SIGNIFICANT CHANGE UP (ref 22–31)
CREAT SERPL-MCNC: 6.65 MG/DL — HIGH (ref 0.5–1.3)
CREAT SERPL-MCNC: 6.65 MG/DL — HIGH (ref 0.5–1.3)
GLUCOSE BLDC GLUCOMTR-MCNC: 119 MG/DL — HIGH (ref 70–99)
GLUCOSE BLDC GLUCOMTR-MCNC: 139 MG/DL — HIGH (ref 70–99)
GLUCOSE BLDC GLUCOMTR-MCNC: 142 MG/DL — HIGH (ref 70–99)
GLUCOSE BLDC GLUCOMTR-MCNC: 162 MG/DL — HIGH (ref 70–99)
GLUCOSE BLDC GLUCOMTR-MCNC: 164 MG/DL — HIGH (ref 70–99)
GLUCOSE SERPL-MCNC: 130 MG/DL — HIGH (ref 70–99)
GLUCOSE SERPL-MCNC: 130 MG/DL — HIGH (ref 70–99)
HCT VFR BLD CALC: 26.9 % — LOW (ref 39–50)
HGB BLD-MCNC: 8.4 G/DL — LOW (ref 13–17)
MAGNESIUM SERPL-MCNC: 2.3 MG/DL — SIGNIFICANT CHANGE UP (ref 1.6–2.6)
MCHC RBC-ENTMCNC: 29.6 PG — SIGNIFICANT CHANGE UP (ref 27–34)
MCHC RBC-ENTMCNC: 31.2 % — LOW (ref 32–36)
MCV RBC AUTO: 94.7 FL — SIGNIFICANT CHANGE UP (ref 80–100)
NRBC # FLD: 0 — SIGNIFICANT CHANGE UP
PLATELET # BLD AUTO: 238 K/UL — SIGNIFICANT CHANGE UP (ref 150–400)
PMV BLD: 10.3 FL — SIGNIFICANT CHANGE UP (ref 7–13)
POTASSIUM SERPL-MCNC: 4 MMOL/L — SIGNIFICANT CHANGE UP (ref 3.5–5.3)
POTASSIUM SERPL-MCNC: 4 MMOL/L — SIGNIFICANT CHANGE UP (ref 3.5–5.3)
POTASSIUM SERPL-SCNC: 4 MMOL/L — SIGNIFICANT CHANGE UP (ref 3.5–5.3)
POTASSIUM SERPL-SCNC: 4 MMOL/L — SIGNIFICANT CHANGE UP (ref 3.5–5.3)
RBC # BLD: 2.84 M/UL — LOW (ref 4.2–5.8)
RBC # FLD: 15.7 % — HIGH (ref 10.3–14.5)
SODIUM SERPL-SCNC: 139 MMOL/L — SIGNIFICANT CHANGE UP (ref 135–145)
SODIUM SERPL-SCNC: 139 MMOL/L — SIGNIFICANT CHANGE UP (ref 135–145)
WBC # BLD: 7.75 K/UL — SIGNIFICANT CHANGE UP (ref 3.8–10.5)
WBC # FLD AUTO: 7.75 K/UL — SIGNIFICANT CHANGE UP (ref 3.8–10.5)

## 2018-07-16 PROCEDURE — 90935 HEMODIALYSIS ONE EVALUATION: CPT

## 2018-07-16 RX ORDER — ACETAMINOPHEN 500 MG
650 TABLET ORAL EVERY 6 HOURS
Qty: 0 | Refills: 0 | Status: DISCONTINUED | OUTPATIENT
Start: 2018-07-16 | End: 2018-07-20

## 2018-07-16 RX ADMIN — PANTOPRAZOLE SODIUM 40 MILLIGRAM(S): 20 TABLET, DELAYED RELEASE ORAL at 05:26

## 2018-07-16 RX ADMIN — Medication 1: at 18:15

## 2018-07-16 RX ADMIN — CLOPIDOGREL BISULFATE 75 MILLIGRAM(S): 75 TABLET, FILM COATED ORAL at 13:31

## 2018-07-16 RX ADMIN — Medication 1 DROP(S): at 05:26

## 2018-07-16 RX ADMIN — Medication 650 MILLIGRAM(S): at 09:13

## 2018-07-16 RX ADMIN — Medication 650 MILLIGRAM(S): at 10:00

## 2018-07-16 RX ADMIN — Medication 5 MILLIGRAM(S): at 17:00

## 2018-07-16 RX ADMIN — Medication 1 DROP(S): at 13:32

## 2018-07-16 RX ADMIN — ATORVASTATIN CALCIUM 40 MILLIGRAM(S): 80 TABLET, FILM COATED ORAL at 21:50

## 2018-07-16 RX ADMIN — HEPARIN SODIUM 5000 UNIT(S): 5000 INJECTION INTRAVENOUS; SUBCUTANEOUS at 05:26

## 2018-07-16 RX ADMIN — Medication 3 MILLIGRAM(S): at 21:50

## 2018-07-16 RX ADMIN — Medication 1 TABLET(S): at 13:32

## 2018-07-16 RX ADMIN — Medication 5 MILLIGRAM(S): at 05:26

## 2018-07-16 RX ADMIN — Medication 25 MILLIGRAM(S): at 17:00

## 2018-07-16 RX ADMIN — AMIODARONE HYDROCHLORIDE 200 MILLIGRAM(S): 400 TABLET ORAL at 13:32

## 2018-07-16 RX ADMIN — TAMSULOSIN HYDROCHLORIDE 0.4 MILLIGRAM(S): 0.4 CAPSULE ORAL at 21:50

## 2018-07-16 RX ADMIN — Medication 81 MILLIGRAM(S): at 13:32

## 2018-07-16 RX ADMIN — HEPARIN SODIUM 5000 UNIT(S): 5000 INJECTION INTRAVENOUS; SUBCUTANEOUS at 17:00

## 2018-07-17 ENCOUNTER — TRANSCRIPTION ENCOUNTER (OUTPATIENT)
Age: 73
End: 2018-07-17

## 2018-07-17 LAB
BACTERIA BLD CULT: SIGNIFICANT CHANGE UP
BACTERIA BLD CULT: SIGNIFICANT CHANGE UP
BUN SERPL-MCNC: 46 MG/DL — HIGH (ref 7–23)
CALCIUM SERPL-MCNC: 9.5 MG/DL — SIGNIFICANT CHANGE UP (ref 8.4–10.5)
CHLORIDE SERPL-SCNC: 96 MMOL/L — LOW (ref 98–107)
CO2 SERPL-SCNC: 27 MMOL/L — SIGNIFICANT CHANGE UP (ref 22–31)
CREAT SERPL-MCNC: 4.67 MG/DL — HIGH (ref 0.5–1.3)
GLUCOSE BLDC GLUCOMTR-MCNC: 123 MG/DL — HIGH (ref 70–99)
GLUCOSE BLDC GLUCOMTR-MCNC: 166 MG/DL — HIGH (ref 70–99)
GLUCOSE BLDC GLUCOMTR-MCNC: 176 MG/DL — HIGH (ref 70–99)
GLUCOSE BLDC GLUCOMTR-MCNC: 205 MG/DL — HIGH (ref 70–99)
GLUCOSE SERPL-MCNC: 125 MG/DL — HIGH (ref 70–99)
HCT VFR BLD CALC: 31 % — LOW (ref 39–50)
HGB BLD-MCNC: 9.6 G/DL — LOW (ref 13–17)
MAGNESIUM SERPL-MCNC: 2.3 MG/DL — SIGNIFICANT CHANGE UP (ref 1.6–2.6)
MCHC RBC-ENTMCNC: 29.9 PG — SIGNIFICANT CHANGE UP (ref 27–34)
MCHC RBC-ENTMCNC: 31 % — LOW (ref 32–36)
MCV RBC AUTO: 96.6 FL — SIGNIFICANT CHANGE UP (ref 80–100)
NRBC # FLD: 0 — SIGNIFICANT CHANGE UP
PLATELET # BLD AUTO: 258 K/UL — SIGNIFICANT CHANGE UP (ref 150–400)
PMV BLD: 9.7 FL — SIGNIFICANT CHANGE UP (ref 7–13)
POTASSIUM SERPL-MCNC: 4.2 MMOL/L — SIGNIFICANT CHANGE UP (ref 3.5–5.3)
POTASSIUM SERPL-SCNC: 4.2 MMOL/L — SIGNIFICANT CHANGE UP (ref 3.5–5.3)
RBC # BLD: 3.21 M/UL — LOW (ref 4.2–5.8)
RBC # FLD: 15.7 % — HIGH (ref 10.3–14.5)
SODIUM SERPL-SCNC: 139 MMOL/L — SIGNIFICANT CHANGE UP (ref 135–145)
WBC # BLD: 6.96 K/UL — SIGNIFICANT CHANGE UP (ref 3.8–10.5)
WBC # FLD AUTO: 6.96 K/UL — SIGNIFICANT CHANGE UP (ref 3.8–10.5)

## 2018-07-17 RX ORDER — LANOLIN ALCOHOL/MO/W.PET/CERES
1 CREAM (GRAM) TOPICAL
Qty: 0 | Refills: 0 | COMMUNITY
Start: 2018-07-17

## 2018-07-17 RX ORDER — METOPROLOL TARTRATE 50 MG
1 TABLET ORAL
Qty: 0 | Refills: 0 | DISCHARGE
Start: 2018-07-17

## 2018-07-17 RX ORDER — CHOLECALCIFEROL (VITAMIN D3) 125 MCG
1 CAPSULE ORAL
Qty: 0 | Refills: 0 | COMMUNITY

## 2018-07-17 RX ORDER — CLOPIDOGREL BISULFATE 75 MG/1
1 TABLET, FILM COATED ORAL
Qty: 0 | Refills: 0 | DISCHARGE
Start: 2018-07-17

## 2018-07-17 RX ORDER — ATORVASTATIN CALCIUM 80 MG/1
1 TABLET, FILM COATED ORAL
Qty: 0 | Refills: 0 | DISCHARGE
Start: 2018-07-17

## 2018-07-17 RX ORDER — ASPIRIN/CALCIUM CARB/MAGNESIUM 324 MG
1 TABLET ORAL
Qty: 0 | Refills: 0 | DISCHARGE
Start: 2018-07-17

## 2018-07-17 RX ORDER — IPRATROPIUM/ALBUTEROL SULFATE 18-103MCG
3 AEROSOL WITH ADAPTER (GRAM) INHALATION
Qty: 0 | Refills: 0 | DISCHARGE
Start: 2018-07-17

## 2018-07-17 RX ORDER — ACETAMINOPHEN 500 MG
2 TABLET ORAL
Qty: 0 | Refills: 0 | DISCHARGE
Start: 2018-07-17

## 2018-07-17 RX ORDER — TAMSULOSIN HYDROCHLORIDE 0.4 MG/1
1 CAPSULE ORAL
Qty: 0 | Refills: 0 | DISCHARGE
Start: 2018-07-17

## 2018-07-17 RX ORDER — AMIODARONE HYDROCHLORIDE 400 MG/1
1 TABLET ORAL
Qty: 0 | Refills: 0 | DISCHARGE
Start: 2018-07-17

## 2018-07-17 RX ORDER — PANTOPRAZOLE SODIUM 20 MG/1
1 TABLET, DELAYED RELEASE ORAL
Qty: 0 | Refills: 0 | COMMUNITY
Start: 2018-07-17

## 2018-07-17 RX ADMIN — HEPARIN SODIUM 5000 UNIT(S): 5000 INJECTION INTRAVENOUS; SUBCUTANEOUS at 04:33

## 2018-07-17 RX ADMIN — ATORVASTATIN CALCIUM 40 MILLIGRAM(S): 80 TABLET, FILM COATED ORAL at 21:47

## 2018-07-17 RX ADMIN — Medication 1 TABLET(S): at 09:13

## 2018-07-17 RX ADMIN — Medication 81 MILLIGRAM(S): at 09:13

## 2018-07-17 RX ADMIN — Medication 5 MILLIGRAM(S): at 17:57

## 2018-07-17 RX ADMIN — Medication 3 MILLIGRAM(S): at 21:47

## 2018-07-17 RX ADMIN — TAMSULOSIN HYDROCHLORIDE 0.4 MILLIGRAM(S): 0.4 CAPSULE ORAL at 21:47

## 2018-07-17 RX ADMIN — PANTOPRAZOLE SODIUM 40 MILLIGRAM(S): 20 TABLET, DELAYED RELEASE ORAL at 04:34

## 2018-07-17 RX ADMIN — Medication 650 MILLIGRAM(S): at 16:40

## 2018-07-17 RX ADMIN — Medication 650 MILLIGRAM(S): at 15:40

## 2018-07-17 RX ADMIN — HEPARIN SODIUM 5000 UNIT(S): 5000 INJECTION INTRAVENOUS; SUBCUTANEOUS at 17:57

## 2018-07-17 RX ADMIN — Medication 2: at 17:57

## 2018-07-17 RX ADMIN — Medication 25 MILLIGRAM(S): at 17:57

## 2018-07-17 RX ADMIN — AMIODARONE HYDROCHLORIDE 200 MILLIGRAM(S): 400 TABLET ORAL at 04:34

## 2018-07-17 RX ADMIN — Medication 5 MILLIGRAM(S): at 04:34

## 2018-07-17 RX ADMIN — Medication 25 MILLIGRAM(S): at 04:34

## 2018-07-17 RX ADMIN — Medication 1: at 12:57

## 2018-07-17 RX ADMIN — CLOPIDOGREL BISULFATE 75 MILLIGRAM(S): 75 TABLET, FILM COATED ORAL at 09:13

## 2018-07-17 NOTE — DISCHARGE NOTE ADULT - SECONDARY DIAGNOSIS.
Acute pulmonary edema Elevated troponin ESRD (end stage renal disease) on dialysis Essential hypertension Dyslipidemia

## 2018-07-17 NOTE — DISCHARGE NOTE ADULT - PATIENT PORTAL LINK FT
You can access the [facility] system Patient Portal, offered by [facility], by registering with the following website: [email or web address]

## 2018-07-17 NOTE — PHYSICAL THERAPY INITIAL EVALUATION ADULT - GENERAL OBSERVATIONS, REHAB EVAL
Pt seen lying in bed, Right LE contracted, heel relief boots in place, pt [place]/english speaking

## 2018-07-17 NOTE — DISCHARGE NOTE ADULT - PLAN OF CARE
Improvement in respiratory status Improved. Continue HD as scheduled for fluid removal. Follow up with your primary care physician routinely. Continue HD as scheduled for fluid removal. Follow up with your primary care physician and cardiologist routinely. Likely secondary to ESRD. Follow up with your primary care physician and cardiologist routinely. Continue HD as scheduled. Continue blood pressure and cholesterol medications. Avoid nephrotoxic drugs such as nonsteroidal anti-inflammatory agents (NSAIDs). Follow up with your nephrologist to monitor your kidney function, continue with low protein and potassium diet. Continue Metoprolol. Monitor BP. Low salt, low cholesterol diet. Follow up with your primary care physician routinely. Continue Atorvastatin. Low cholesterol, low fat diet. Follow up with your primary care physician routinely.

## 2018-07-17 NOTE — DISCHARGE NOTE ADULT - COMMUNITY RESOURCES
Return to [facility] [phone].  Dialysis onsite at [facility] [phone]. [facility] ambulance [phone]

## 2018-07-17 NOTE — DISCHARGE NOTE ADULT - MEDICATION SUMMARY - MEDICATIONS TO TAKE
I will START or STAY ON the medications listed below when I get home from the hospital:    acetaminophen 325 mg oral tablet  -- 2 tab(s) by mouth every 6 hours, As needed, Severe Pain (7 - 10)  -- Indication: For Pain    aspirin 81 mg oral tablet, chewable  -- 1 tab(s) by mouth once a day  -- Indication: For CAD    tamsulosin 0.4 mg oral capsule  -- 1 cap(s) by mouth once a day (at bedtime)  -- Indication: For BPH    amiodarone 200 mg oral tablet  -- 1 tab(s) by mouth once a day  -- Indication: For Atrial fibrillation    atorvastatin 40 mg oral tablet  -- 1 tab(s) by mouth once a day (at bedtime)  -- Indication: For Dyslipidemia    clopidogrel 75 mg oral tablet  -- 1 tab(s) by mouth once a day  -- Indication: For CAD    busPIRone 5 mg oral tablet  -- 1 tab(s) by mouth 2 times a day  -- Indication: For Bipolar disorder    metoprolol tartrate 25 mg oral tablet  -- 1 tab(s) by mouth 2 times a day  -- Indication: For Atrial fibrillation    ipratropium-albuterol 0.5 mg-2.5 mg/3 mLinhalation solution  -- 3 milliliter(s) inhaled every 6 hours, As needed, Shortness of Breath and/or Wheezing  -- Indication: For Shortness of breath    melatonin 3 mg oral tablet  -- 1 tab(s) by mouth once a day (at bedtime), As needed, Insomnia  -- Indication: For Insomnia    ocular lubricant ophthalmic solution  -- 1 drop(s) to each affected eye 4 times a day  -- Indication: For Dry eyes    pantoprazole 40 mg oral delayed release tablet  -- 1 tab(s) by mouth once a day (before a meal)  -- Indication: For GERD prophylaxis    Multiple Vitamins oral tablet  -- 1 tab(s) by mouth once a day  -- Indication: For Supplement

## 2018-07-17 NOTE — DISCHARGE NOTE ADULT - CARE PLAN
Principal Discharge DX:	Acute respiratory failure with hypoxia  Goal:	Improvement in respiratory status  Assessment and plan of treatment:	Improved. Continue HD as scheduled for fluid removal. Follow up with your primary care physician routinely.  Secondary Diagnosis:	Acute pulmonary edema  Assessment and plan of treatment:	Continue HD as scheduled for fluid removal. Follow up with your primary care physician and cardiologist routinely.  Secondary Diagnosis:	Elevated troponin  Assessment and plan of treatment:	Likely secondary to ESRD. Follow up with your primary care physician and cardiologist routinely.  Secondary Diagnosis:	ESRD (end stage renal disease) on dialysis  Assessment and plan of treatment:	Continue HD as scheduled. Continue blood pressure and cholesterol medications. Avoid nephrotoxic drugs such as nonsteroidal anti-inflammatory agents (NSAIDs). Follow up with your nephrologist to monitor your kidney function, continue with low protein and potassium diet.  Secondary Diagnosis:	Essential hypertension  Assessment and plan of treatment:	Continue Metoprolol. Monitor BP. Low salt, low cholesterol diet. Follow up with your primary care physician routinely.  Secondary Diagnosis:	Dyslipidemia  Assessment and plan of treatment:	Continue Atorvastatin. Low cholesterol, low fat diet. Follow up with your primary care physician routinely.

## 2018-07-17 NOTE — PHYSICAL THERAPY INITIAL EVALUATION ADULT - PASSIVE RANGE OF MOTION EXAMINATION, REHAB EVAL
bilateral upper extremity Passive ROM was WFL (within functional limits)/Left LE Passive ROM was WFL (within functional limits)/Left knee extension ~ -10 deg

## 2018-07-17 NOTE — DISCHARGE NOTE ADULT - HOSPITAL COURSE
73 y/o M with PMHx of CAD (s/p 12-14 stents placed in 1990's, and BABAR x 5 placed in Sept 2017), ESRD(on HD M/W/F), Bipolar disorder, COPD, BPH, CVA, DM, HLD, HTN, Afib (off coumadin 2/2 to bleed last admission), CHF (with EF of 20%) presented with the complaint of CHAMPION and SOB, admitted to telemetry for rule out CHF exacerbation vs. fluid overload from ESRD. Patient was made DNR. EKG: NSR at a rate of 79 with possible LAE and QTc of 497. CE: Trop: 332->367-->326. BNP: 33,945. CXR: Pulmonary edema. Lactate: 5.0 --> 1.2. Cardiology and renal were consulted and patient was given IV Lasix 40mg x 1 dose and received urgent HD for fluid removal. Patient was placed on BiPAP for respiratory distress then was weaned off. Recent echo shows moderate LV dysfunction. Mild troponin elevation likely secondary to ESRD and CHF. On tele, patient had an episode of 24 beats of NSVT while eating, patient asymptomatic, VSS. Patient's respiratory and fluid status improved. 71 y/o M with PMHx of CAD (s/p 12-14 stents placed in 1990's, and BABAR x 5 placed in Sept 2017), ESRD(on HD M/W/F), Bipolar disorder, COPD, BPH, CVA, DM, HLD, HTN, Afib (off coumadin 2/2 to bleed last admission), CHF (with EF of 20%) presented with the complaint of CHAMPION and SOB, admitted to telemetry for rule out CHF exacerbation vs. fluid overload from ESRD. Patient was made DNR. EKG: NSR at a rate of 79 with possible LAE and QTc of 497. CE: Trop: 332->367-->326. BNP: 33,945. CXR: Pulmonary edema. Lactate: 5.0 --> 1.2. Cardiology and renal were consulted and patient was given IV Lasix 40mg x 1 dose and received urgent HD for fluid removal. Patient was placed on BiPAP for respiratory distress then was weaned off. Recent echo shows moderate LV dysfunction. Mild troponin elevation likely secondary to ESRD and CHF. On tele, patient had an episode of 24 beats of NSVT while eating, patient asymptomatic, VSS. Patient's respiratory and fluid status improved.  Case discussed with attending, Pt is stable For Discharge to Rehab.

## 2018-07-17 NOTE — DISCHARGE NOTE ADULT - CARE PROVIDER_API CALL
Reid Martinez), Cardiovascular Disease; Internal Medicine; Nuclear Cardiology  [address] [place] [address]  Phone: [phone]  Fax: [phone]

## 2018-07-17 NOTE — PHYSICAL THERAPY INITIAL EVALUATION ADULT - PERTINENT HX OF CURRENT PROBLEM, REHAB EVAL
73 y/o M with PMH of CAD, ESRD on HD, Bipolar disorder, COPD, BPH, CVA, DM, HLD, HTN, Afib(off coumadin 2/2 to bleed last admission), CHF(with EF of 20%) presented with the complaint of CHAMPION and SOB. R/o CHF vs fluid overload from ESRD.

## 2018-07-18 LAB
BUN SERPL-MCNC: 71 MG/DL — HIGH (ref 7–23)
CALCIUM SERPL-MCNC: 9.3 MG/DL — SIGNIFICANT CHANGE UP (ref 8.4–10.5)
CHLORIDE SERPL-SCNC: 94 MMOL/L — LOW (ref 98–107)
CO2 SERPL-SCNC: 26 MMOL/L — SIGNIFICANT CHANGE UP (ref 22–31)
CREAT SERPL-MCNC: 6.35 MG/DL — HIGH (ref 0.5–1.3)
GLUCOSE BLDC GLUCOMTR-MCNC: 104 MG/DL — HIGH (ref 70–99)
GLUCOSE BLDC GLUCOMTR-MCNC: 106 MG/DL — HIGH (ref 70–99)
GLUCOSE BLDC GLUCOMTR-MCNC: 120 MG/DL — HIGH (ref 70–99)
GLUCOSE BLDC GLUCOMTR-MCNC: 138 MG/DL — HIGH (ref 70–99)
GLUCOSE BLDC GLUCOMTR-MCNC: 199 MG/DL — HIGH (ref 70–99)
GLUCOSE SERPL-MCNC: 118 MG/DL — HIGH (ref 70–99)
HBV SURFACE AG SER-ACNC: NEGATIVE — SIGNIFICANT CHANGE UP
HCT VFR BLD CALC: 28.2 % — LOW (ref 39–50)
HGB BLD-MCNC: 8.9 G/DL — LOW (ref 13–17)
MAGNESIUM SERPL-MCNC: 2.3 MG/DL — SIGNIFICANT CHANGE UP (ref 1.6–2.6)
MCHC RBC-ENTMCNC: 29.9 PG — SIGNIFICANT CHANGE UP (ref 27–34)
MCHC RBC-ENTMCNC: 31.6 % — LOW (ref 32–36)
MCV RBC AUTO: 94.6 FL — SIGNIFICANT CHANGE UP (ref 80–100)
NRBC # FLD: 0 — SIGNIFICANT CHANGE UP
PHOSPHATE SERPL-MCNC: 4.8 MG/DL — HIGH (ref 2.5–4.5)
PLATELET # BLD AUTO: 229 K/UL — SIGNIFICANT CHANGE UP (ref 150–400)
PMV BLD: 9.8 FL — SIGNIFICANT CHANGE UP (ref 7–13)
POTASSIUM SERPL-MCNC: 4 MMOL/L — SIGNIFICANT CHANGE UP (ref 3.5–5.3)
POTASSIUM SERPL-SCNC: 4 MMOL/L — SIGNIFICANT CHANGE UP (ref 3.5–5.3)
RBC # BLD: 2.98 M/UL — LOW (ref 4.2–5.8)
RBC # FLD: 15.4 % — HIGH (ref 10.3–14.5)
SODIUM SERPL-SCNC: 138 MMOL/L — SIGNIFICANT CHANGE UP (ref 135–145)
WBC # BLD: 6.98 K/UL — SIGNIFICANT CHANGE UP (ref 3.8–10.5)
WBC # FLD AUTO: 6.98 K/UL — SIGNIFICANT CHANGE UP (ref 3.8–10.5)

## 2018-07-18 PROCEDURE — 90935 HEMODIALYSIS ONE EVALUATION: CPT

## 2018-07-18 RX ADMIN — HEPARIN SODIUM 5000 UNIT(S): 5000 INJECTION INTRAVENOUS; SUBCUTANEOUS at 17:01

## 2018-07-18 RX ADMIN — TAMSULOSIN HYDROCHLORIDE 0.4 MILLIGRAM(S): 0.4 CAPSULE ORAL at 22:54

## 2018-07-18 RX ADMIN — AMIODARONE HYDROCHLORIDE 200 MILLIGRAM(S): 400 TABLET ORAL at 04:53

## 2018-07-18 RX ADMIN — Medication 5 MILLIGRAM(S): at 17:01

## 2018-07-18 RX ADMIN — Medication 5 MILLIGRAM(S): at 04:53

## 2018-07-18 RX ADMIN — CLOPIDOGREL BISULFATE 75 MILLIGRAM(S): 75 TABLET, FILM COATED ORAL at 12:50

## 2018-07-18 RX ADMIN — Medication 1: at 12:50

## 2018-07-18 RX ADMIN — HEPARIN SODIUM 5000 UNIT(S): 5000 INJECTION INTRAVENOUS; SUBCUTANEOUS at 04:53

## 2018-07-18 RX ADMIN — Medication 25 MILLIGRAM(S): at 17:01

## 2018-07-18 RX ADMIN — Medication 1 TABLET(S): at 12:50

## 2018-07-18 RX ADMIN — ATORVASTATIN CALCIUM 40 MILLIGRAM(S): 80 TABLET, FILM COATED ORAL at 22:54

## 2018-07-18 RX ADMIN — Medication 81 MILLIGRAM(S): at 12:50

## 2018-07-18 RX ADMIN — PANTOPRAZOLE SODIUM 40 MILLIGRAM(S): 20 TABLET, DELAYED RELEASE ORAL at 04:53

## 2018-07-19 LAB
BASOPHILS # BLD AUTO: 0.03 K/UL — SIGNIFICANT CHANGE UP (ref 0–0.2)
BASOPHILS NFR BLD AUTO: 0.4 % — SIGNIFICANT CHANGE UP (ref 0–2)
BUN SERPL-MCNC: 43 MG/DL — HIGH (ref 7–23)
CALCIUM SERPL-MCNC: 9.7 MG/DL — SIGNIFICANT CHANGE UP (ref 8.4–10.5)
CHLORIDE SERPL-SCNC: 94 MMOL/L — LOW (ref 98–107)
CO2 SERPL-SCNC: 28 MMOL/L — SIGNIFICANT CHANGE UP (ref 22–31)
CREAT SERPL-MCNC: 4.27 MG/DL — HIGH (ref 0.5–1.3)
EOSINOPHIL # BLD AUTO: 0.28 K/UL — SIGNIFICANT CHANGE UP (ref 0–0.5)
EOSINOPHIL NFR BLD AUTO: 3.8 % — SIGNIFICANT CHANGE UP (ref 0–6)
GLUCOSE BLDC GLUCOMTR-MCNC: 119 MG/DL — HIGH (ref 70–99)
GLUCOSE BLDC GLUCOMTR-MCNC: 147 MG/DL — HIGH (ref 70–99)
GLUCOSE BLDC GLUCOMTR-MCNC: 186 MG/DL — HIGH (ref 70–99)
GLUCOSE BLDC GLUCOMTR-MCNC: 219 MG/DL — HIGH (ref 70–99)
GLUCOSE SERPL-MCNC: 100 MG/DL — HIGH (ref 70–99)
HCT VFR BLD CALC: 32.8 % — LOW (ref 39–50)
HGB BLD-MCNC: 9.9 G/DL — LOW (ref 13–17)
IMM GRANULOCYTES # BLD AUTO: 0.02 # — SIGNIFICANT CHANGE UP
IMM GRANULOCYTES NFR BLD AUTO: 0.3 % — SIGNIFICANT CHANGE UP (ref 0–1.5)
LYMPHOCYTES # BLD AUTO: 2.3 K/UL — SIGNIFICANT CHANGE UP (ref 1–3.3)
LYMPHOCYTES # BLD AUTO: 31.6 % — SIGNIFICANT CHANGE UP (ref 13–44)
MAGNESIUM SERPL-MCNC: 2.3 MG/DL — SIGNIFICANT CHANGE UP (ref 1.6–2.6)
MCHC RBC-ENTMCNC: 28.7 PG — SIGNIFICANT CHANGE UP (ref 27–34)
MCHC RBC-ENTMCNC: 30.2 % — LOW (ref 32–36)
MCV RBC AUTO: 95.1 FL — SIGNIFICANT CHANGE UP (ref 80–100)
MONOCYTES # BLD AUTO: 0.62 K/UL — SIGNIFICANT CHANGE UP (ref 0–0.9)
MONOCYTES NFR BLD AUTO: 8.5 % — SIGNIFICANT CHANGE UP (ref 2–14)
NEUTROPHILS # BLD AUTO: 4.04 K/UL — SIGNIFICANT CHANGE UP (ref 1.8–7.4)
NEUTROPHILS NFR BLD AUTO: 55.4 % — SIGNIFICANT CHANGE UP (ref 43–77)
NRBC # FLD: 0 — SIGNIFICANT CHANGE UP
PLATELET # BLD AUTO: 243 K/UL — SIGNIFICANT CHANGE UP (ref 150–400)
PMV BLD: 9.8 FL — SIGNIFICANT CHANGE UP (ref 7–13)
POTASSIUM SERPL-MCNC: 4 MMOL/L — SIGNIFICANT CHANGE UP (ref 3.5–5.3)
POTASSIUM SERPL-SCNC: 4 MMOL/L — SIGNIFICANT CHANGE UP (ref 3.5–5.3)
RBC # BLD: 3.45 M/UL — LOW (ref 4.2–5.8)
RBC # FLD: 15.5 % — HIGH (ref 10.3–14.5)
SODIUM SERPL-SCNC: 139 MMOL/L — SIGNIFICANT CHANGE UP (ref 135–145)
WBC # BLD: 7.29 K/UL — SIGNIFICANT CHANGE UP (ref 3.8–10.5)
WBC # FLD AUTO: 7.29 K/UL — SIGNIFICANT CHANGE UP (ref 3.8–10.5)

## 2018-07-19 PROCEDURE — 99222 1ST HOSP IP/OBS MODERATE 55: CPT | Mod: GC

## 2018-07-19 RX ADMIN — AMIODARONE HYDROCHLORIDE 200 MILLIGRAM(S): 400 TABLET ORAL at 05:09

## 2018-07-19 RX ADMIN — Medication 650 MILLIGRAM(S): at 11:32

## 2018-07-19 RX ADMIN — Medication 25 MILLIGRAM(S): at 17:38

## 2018-07-19 RX ADMIN — CLOPIDOGREL BISULFATE 75 MILLIGRAM(S): 75 TABLET, FILM COATED ORAL at 10:40

## 2018-07-19 RX ADMIN — Medication 650 MILLIGRAM(S): at 10:40

## 2018-07-19 RX ADMIN — Medication 5 MILLIGRAM(S): at 05:09

## 2018-07-19 RX ADMIN — Medication 1 DROP(S): at 05:09

## 2018-07-19 RX ADMIN — HEPARIN SODIUM 5000 UNIT(S): 5000 INJECTION INTRAVENOUS; SUBCUTANEOUS at 17:38

## 2018-07-19 RX ADMIN — Medication 81 MILLIGRAM(S): at 10:40

## 2018-07-19 RX ADMIN — Medication 2: at 17:39

## 2018-07-19 RX ADMIN — Medication 1 TABLET(S): at 10:40

## 2018-07-19 RX ADMIN — Medication 5 MILLIGRAM(S): at 17:38

## 2018-07-19 RX ADMIN — Medication 1 DROP(S): at 13:34

## 2018-07-19 RX ADMIN — ATORVASTATIN CALCIUM 40 MILLIGRAM(S): 80 TABLET, FILM COATED ORAL at 21:22

## 2018-07-19 RX ADMIN — Medication 25 MILLIGRAM(S): at 05:10

## 2018-07-19 RX ADMIN — TAMSULOSIN HYDROCHLORIDE 0.4 MILLIGRAM(S): 0.4 CAPSULE ORAL at 21:22

## 2018-07-19 RX ADMIN — HEPARIN SODIUM 5000 UNIT(S): 5000 INJECTION INTRAVENOUS; SUBCUTANEOUS at 05:10

## 2018-07-19 RX ADMIN — Medication 3 MILLIGRAM(S): at 21:22

## 2018-07-19 RX ADMIN — PANTOPRAZOLE SODIUM 40 MILLIGRAM(S): 20 TABLET, DELAYED RELEASE ORAL at 05:10

## 2018-07-19 NOTE — PROGRESS NOTE ADULT - PROBLEM SELECTOR PLAN 5
Continue with antihypertensive medications   DASH diet recommended

## 2018-07-19 NOTE — CONSULT NOTE ADULT - ASSESSMENT
RLE contracture hamstring  Bedside ROM qshift  WOuld benefit from Botox injection back at [place] ( will contact PM&R there )

## 2018-07-19 NOTE — PROGRESS NOTE ADULT - PROBLEM SELECTOR PLAN 8
On heparin sq 5000U q12hrs

## 2018-07-19 NOTE — PROGRESS NOTE ADULT - PROBLEM SELECTOR PROBLEM 2
Elevated troponin

## 2018-07-19 NOTE — PROGRESS NOTE ADULT - PROBLEM SELECTOR PLAN 6
Continue with Lipitor daily

## 2018-07-19 NOTE — CONSULT NOTE ADULT - SUBJECTIVE AND OBJECTIVE BOX
71 y/o M with PMH of CAD(s/p 12-14 stents placed in 1990's, and BABAR x 5 placed in Sept 2017), ESRD(on HD M/W/F), Bipolar disorder, COPD, BPH, CVA, DM, HLD, HTN, Afib(off coumadin 2/2 to bleed last admission), CHF(with EF of 20%) presented with the complaint of CHAMPION and SOB. R/o CHF vs fluid overload from ESRD.s/p IV Lasix 40mg and currently getting dialysis   Asked to see pt for LE contracture.      REVIEW OF SYSTEMS: No chest pain, shortness of breath, nausea, vomiting or diarhea.      PAST MEDICAL & SURGICAL HISTORY  CVA (cerebral vascular accident)  COPD (chronic obstructive pulmonary disease)  BPH (benign prostatic hyperplasia)  Bipolar affective disorder  CKD (chronic kidney disease)  Pancreatitis  Hypertension  Dyslipidemia  Diabetes mellitus  Coronary artery disease  HLD (hyperlipidemia)  Anemia  Acute renal failure  CKD (chronic kidney disease)  COPD (chronic obstructive pulmonary disease)  Fainting  Cardiac arrhythmia  Dizziness  Hydronephrosis  Cholecystitis  Bipolar 1 disorder  DM (diabetes mellitus)  HTN (hypertension)  Lumbar radiculopathy  Left heart failure  Reflux esophagitis  Coronary atherosclerosis  History of cardiac catheterization  No significant past surgical history      SOCIAL HISTORY  Smoking - Denied, EtOH - Denied, Drugs - Denied    FUNCTIONAL HISTORY:   Lives   Independent    CURRENT FUNCTIONAL STATUS:      FAMILY HISTORY   No pertinent family history in first degree relatives      RECENT LABS/IMAGING  CBC Full  -  ( 19 Jul 2018 05:45 )  WBC Count : 7.29 K/uL  Hemoglobin : 9.9 g/dL  Hematocrit : 32.8 %  Platelet Count - Automated : 243 K/uL  Mean Cell Volume : 95.1 fL  Mean Cell Hemoglobin : 28.7 pg  Mean Cell Hemoglobin Concentration : 30.2 %  Auto Neutrophil # : 4.04 K/uL  Auto Lymphocyte # : 2.30 K/uL  Auto Monocyte # : 0.62 K/uL  Auto Eosinophil # : 0.28 K/uL  Auto Basophil # : 0.03 K/uL  Auto Neutrophil % : 55.4 %  Auto Lymphocyte % : 31.6 %  Auto Monocyte % : 8.5 %  Auto Eosinophil % : 3.8 %  Auto Basophil % : 0.4 %    07-19    139  |  94<L>  |  43<H>  ----------------------------<  100<H>  4.0   |  28  |  4.27<H>    Ca    9.7      19 Jul 2018 05:45  Phos  4.8     07-18  Mg     2.3     07-19          VITALS  T(C): 36.9 (07-19-18 @ 05:06), Max: 36.9 (07-19-18 @ 05:06)  HR: 70 (07-19-18 @ 05:06) (70 - 76)  BP: 137/67 (07-19-18 @ 05:06) (109/51 - 137/67)  RR: 16 (07-19-18 @ 05:06) (16 - 17)  SpO2: 100% (07-19-18 @ 05:06) (100% - 100%)  Wt(kg): --    ALLERGIES  No Known Allergies      MEDICATIONS   acetaminophen   Tablet. 650 milliGRAM(s) Oral every 6 hours PRN  ALBUTerol/ipratropium for Nebulization 3 milliLiter(s) Nebulizer every 6 hours PRN  amiodarone    Tablet 200 milliGRAM(s) Oral daily  artificial  tears Solution 1 Drop(s) Both EYES four times a day  aspirin  chewable 81 milliGRAM(s) Oral daily  atorvastatin 40 milliGRAM(s) Oral at bedtime  busPIRone 5 milliGRAM(s) Oral two times a day  clopidogrel Tablet 75 milliGRAM(s) Oral daily  dextrose 40% Gel 15 Gram(s) Oral once PRN  dextrose 5%. 1000 milliLiter(s) IV Continuous <Continuous>  dextrose 50% Injectable 12.5 Gram(s) IV Push once  dextrose 50% Injectable 25 Gram(s) IV Push once  dextrose 50% Injectable 25 Gram(s) IV Push once  glucagon  Injectable 1 milliGRAM(s) IntraMuscular once PRN  heparin  Injectable 5000 Unit(s) SubCutaneous every 12 hours  insulin lispro (HumaLOG) corrective regimen sliding scale   SubCutaneous three times a day before meals  insulin lispro (HumaLOG) corrective regimen sliding scale   SubCutaneous at bedtime  melatonin 3 milliGRAM(s) Oral at bedtime PRN  metoprolol tartrate 25 milliGRAM(s) Oral two times a day  multivitamin 1 Tablet(s) Oral daily  pantoprazole    Tablet 40 milliGRAM(s) Oral before breakfast  tamsulosin 0.4 milliGRAM(s) Oral at bedtime      ----------------------------------------------------------------------------------------  PHYSICAL EXAM  Constitutional - NAD, Comfortable  HEENT - NCAT, EOMI  Neck - Supple, No limited ROM  Chest - CTA bilaterally, No wheeze, No rhonchi, No crackles  Cardiovascular - RRR, S1S2, No murmurs  Abdomen - BS+, Soft, NTND  Extremities - No C/C/E, No calf tenderness   Neurologic Exam -                    Cognitive - Awake, Alert, AAO to self, place, date, year, situation     Communication - Fluent, No dysarthria, no aphasia     Cranial Nerves - CN 2-12 intact     Motor - No focal deficits                       Sensory - Intact to LT     Reflexes - DTR Intact, No primitive reflexive     Balance - WNL Static  Psychiatric - Mood stable, Affect WNL 73 y/o M with PMH of CAD(s/p 12-14 stents placed in 1990's, and BABAR x 5 placed in Sept 2017), ESRD(on HD M/W/F), Bipolar disorder, COPD, BPH, CVA, DM, HLD, HTN, Afib(off coumadin 2/2 to bleed last admission), CHF(with EF of 20%) presented with the complaint of CHAMPION and SOB. R/o CHF vs fluid overload from ESRD.s/p IV Lasix 40mg and currently getting dialysis   Asked to see pt for LE contracture. + contracture right hip/knee since CVA. PT states it;s painful to move.       REVIEW OF SYSTEMS: No chest pain, shortness of breath, nausea, vomiting or diarhea.      PAST MEDICAL & SURGICAL HISTORY  CVA (cerebral vascular accident)  COPD (chronic obstructive pulmonary disease)  BPH (benign prostatic hyperplasia)  Bipolar affective disorder  CKD (chronic kidney disease)  Pancreatitis  Hypertension  Dyslipidemia  Diabetes mellitus  Coronary artery disease  HLD (hyperlipidemia)  Anemia  Acute renal failure  CKD (chronic kidney disease)  COPD (chronic obstructive pulmonary disease)  Fainting  Cardiac arrhythmia  Dizziness  Hydronephrosis  Cholecystitis  Bipolar 1 disorder  DM (diabetes mellitus)  HTN (hypertension)  Lumbar radiculopathy  Left heart failure  Reflux esophagitis  Coronary atherosclerosis  History of cardiac catheterization  No significant past surgical history      SOCIAL HISTORY  Smoking - Denied, EtOH - Denied, Drugs - Denied    CURRENT FUNCTIONAL STATUS: max a       FAMILY HISTORY   No pertinent family history in first degree relatives      RECENT LABS/IMAGING  CBC Full  -  ( 19 Jul 2018 05:45 )  WBC Count : 7.29 K/uL  Hemoglobin : 9.9 g/dL  Hematocrit : 32.8 %  Platelet Count - Automated : 243 K/uL  Mean Cell Volume : 95.1 fL  Mean Cell Hemoglobin : 28.7 pg  Mean Cell Hemoglobin Concentration : 30.2 %  Auto Neutrophil # : 4.04 K/uL  Auto Lymphocyte # : 2.30 K/uL  Auto Monocyte # : 0.62 K/uL  Auto Eosinophil # : 0.28 K/uL  Auto Basophil # : 0.03 K/uL  Auto Neutrophil % : 55.4 %  Auto Lymphocyte % : 31.6 %  Auto Monocyte % : 8.5 %  Auto Eosinophil % : 3.8 %  Auto Basophil % : 0.4 %    07-19    139  |  94<L>  |  43<H>  ----------------------------<  100<H>  4.0   |  28  |  4.27<H>    Ca    9.7      19 Jul 2018 05:45  Phos  4.8     07-18  Mg     2.3     07-19          VITALS  T(C): 36.9 (07-19-18 @ 05:06), Max: 36.9 (07-19-18 @ 05:06)  HR: 70 (07-19-18 @ 05:06) (70 - 76)  BP: 137/67 (07-19-18 @ 05:06) (109/51 - 137/67)  RR: 16 (07-19-18 @ 05:06) (16 - 17)  SpO2: 100% (07-19-18 @ 05:06) (100% - 100%)  Wt(kg): --    ALLERGIES  No Known Allergies      MEDICATIONS   acetaminophen   Tablet. 650 milliGRAM(s) Oral every 6 hours PRN  ALBUTerol/ipratropium for Nebulization 3 milliLiter(s) Nebulizer every 6 hours PRN  amiodarone    Tablet 200 milliGRAM(s) Oral daily  artificial  tears Solution 1 Drop(s) Both EYES four times a day  aspirin  chewable 81 milliGRAM(s) Oral daily  atorvastatin 40 milliGRAM(s) Oral at bedtime  busPIRone 5 milliGRAM(s) Oral two times a day  clopidogrel Tablet 75 milliGRAM(s) Oral daily  dextrose 40% Gel 15 Gram(s) Oral once PRN  dextrose 5%. 1000 milliLiter(s) IV Continuous <Continuous>  dextrose 50% Injectable 12.5 Gram(s) IV Push once  dextrose 50% Injectable 25 Gram(s) IV Push once  dextrose 50% Injectable 25 Gram(s) IV Push once  glucagon  Injectable 1 milliGRAM(s) IntraMuscular once PRN  heparin  Injectable 5000 Unit(s) SubCutaneous every 12 hours  insulin lispro (HumaLOG) corrective regimen sliding scale   SubCutaneous three times a day before meals  insulin lispro (HumaLOG) corrective regimen sliding scale   SubCutaneous at bedtime  melatonin 3 milliGRAM(s) Oral at bedtime PRN  metoprolol tartrate 25 milliGRAM(s) Oral two times a day  multivitamin 1 Tablet(s) Oral daily  pantoprazole    Tablet 40 milliGRAM(s) Oral before breakfast  tamsulosin 0.4 milliGRAM(s) Oral at bedtime      ----------------------------------------------------------------------------------------  PHYSICAL EXAM  Constitutional - NAD, Comfortable  HEENT - NCAT, EOMI  Neck - Supple, No limited ROM  Chest - CTA bilaterally, No wheeze, No rhonchi, No crackles  Cardiovascular - RRR, S1S2, No murmurs  Abdomen - BS+, Soft, NTND  Extremities - No C/C/E, No calf tenderness   Neurologic Exam -                    Cognitive - Awake, Alert, AAO to self, place, date, year, situation     Communication - Fluent, No dysarthria, no aphasia     Cranial Nerves - CN 2-12 intact     Motor - 3/5 LLE, RLE difficult to examine due to cooperation but appears + hamstring contracture                        Sensory - Intact to LT   Psychiatric - Mood stable, Affect WNL

## 2018-07-19 NOTE — PROGRESS NOTE ADULT - PROBLEM SELECTOR PLAN 7
H&H: 9.5/31.4 in the setting of underline ESRD  Will monitor for now

## 2018-07-19 NOTE — PROGRESS NOTE ADULT - PROBLEM/PLAN-2
DISPLAY PLAN FREE TEXT
Discharged

## 2018-07-19 NOTE — PROGRESS NOTE ADULT - PROBLEM SELECTOR PLAN 3
HD as scheduled

## 2018-07-20 VITALS — OXYGEN SATURATION: 100 % | HEART RATE: 75 BPM

## 2018-07-20 LAB
BASOPHILS # BLD AUTO: 0.02 K/UL — SIGNIFICANT CHANGE UP (ref 0–0.2)
BASOPHILS NFR BLD AUTO: 0.3 % — SIGNIFICANT CHANGE UP (ref 0–2)
BUN SERPL-MCNC: 70 MG/DL — HIGH (ref 7–23)
CALCIUM SERPL-MCNC: 9.3 MG/DL — SIGNIFICANT CHANGE UP (ref 8.4–10.5)
CHLORIDE SERPL-SCNC: 96 MMOL/L — LOW (ref 98–107)
CO2 SERPL-SCNC: 27 MMOL/L — SIGNIFICANT CHANGE UP (ref 22–31)
CREAT SERPL-MCNC: 5.82 MG/DL — HIGH (ref 0.5–1.3)
EOSINOPHIL # BLD AUTO: 0.26 K/UL — SIGNIFICANT CHANGE UP (ref 0–0.5)
EOSINOPHIL NFR BLD AUTO: 3.5 % — SIGNIFICANT CHANGE UP (ref 0–6)
GLUCOSE BLDC GLUCOMTR-MCNC: 116 MG/DL — HIGH (ref 70–99)
GLUCOSE BLDC GLUCOMTR-MCNC: 155 MG/DL — HIGH (ref 70–99)
GLUCOSE SERPL-MCNC: 112 MG/DL — HIGH (ref 70–99)
HCT VFR BLD CALC: 26.9 % — LOW (ref 39–50)
HGB BLD-MCNC: 8.3 G/DL — LOW (ref 13–17)
IMM GRANULOCYTES # BLD AUTO: 0.02 # — SIGNIFICANT CHANGE UP
IMM GRANULOCYTES NFR BLD AUTO: 0.3 % — SIGNIFICANT CHANGE UP (ref 0–1.5)
LYMPHOCYTES # BLD AUTO: 2.05 K/UL — SIGNIFICANT CHANGE UP (ref 1–3.3)
LYMPHOCYTES # BLD AUTO: 27.8 % — SIGNIFICANT CHANGE UP (ref 13–44)
MAGNESIUM SERPL-MCNC: 2.3 MG/DL — SIGNIFICANT CHANGE UP (ref 1.6–2.6)
MCHC RBC-ENTMCNC: 29.2 PG — SIGNIFICANT CHANGE UP (ref 27–34)
MCHC RBC-ENTMCNC: 30.9 % — LOW (ref 32–36)
MCV RBC AUTO: 94.7 FL — SIGNIFICANT CHANGE UP (ref 80–100)
MONOCYTES # BLD AUTO: 0.6 K/UL — SIGNIFICANT CHANGE UP (ref 0–0.9)
MONOCYTES NFR BLD AUTO: 8.1 % — SIGNIFICANT CHANGE UP (ref 2–14)
NEUTROPHILS # BLD AUTO: 4.43 K/UL — SIGNIFICANT CHANGE UP (ref 1.8–7.4)
NEUTROPHILS NFR BLD AUTO: 60 % — SIGNIFICANT CHANGE UP (ref 43–77)
NRBC # FLD: 0 — SIGNIFICANT CHANGE UP
PLATELET # BLD AUTO: 197 K/UL — SIGNIFICANT CHANGE UP (ref 150–400)
PMV BLD: 10 FL — SIGNIFICANT CHANGE UP (ref 7–13)
POTASSIUM SERPL-MCNC: 3.9 MMOL/L — SIGNIFICANT CHANGE UP (ref 3.5–5.3)
POTASSIUM SERPL-SCNC: 3.9 MMOL/L — SIGNIFICANT CHANGE UP (ref 3.5–5.3)
RBC # BLD: 2.84 M/UL — LOW (ref 4.2–5.8)
RBC # FLD: 14.8 % — HIGH (ref 10.3–14.5)
SODIUM SERPL-SCNC: 138 MMOL/L — SIGNIFICANT CHANGE UP (ref 135–145)
WBC # BLD: 7.38 K/UL — SIGNIFICANT CHANGE UP (ref 3.8–10.5)
WBC # FLD AUTO: 7.38 K/UL — SIGNIFICANT CHANGE UP (ref 3.8–10.5)

## 2018-07-20 PROCEDURE — 90935 HEMODIALYSIS ONE EVALUATION: CPT

## 2018-07-20 RX ADMIN — Medication 5 MILLIGRAM(S): at 05:08

## 2018-07-20 RX ADMIN — Medication 1 DROP(S): at 05:08

## 2018-07-20 RX ADMIN — Medication 1 TABLET(S): at 11:48

## 2018-07-20 RX ADMIN — HEPARIN SODIUM 5000 UNIT(S): 5000 INJECTION INTRAVENOUS; SUBCUTANEOUS at 05:07

## 2018-07-20 RX ADMIN — Medication 81 MILLIGRAM(S): at 11:48

## 2018-07-20 RX ADMIN — CLOPIDOGREL BISULFATE 75 MILLIGRAM(S): 75 TABLET, FILM COATED ORAL at 11:48

## 2018-07-20 RX ADMIN — PANTOPRAZOLE SODIUM 40 MILLIGRAM(S): 20 TABLET, DELAYED RELEASE ORAL at 05:09

## 2018-07-20 RX ADMIN — Medication 1 DROP(S): at 11:51

## 2018-07-20 RX ADMIN — Medication 1: at 13:51

## 2018-07-20 RX ADMIN — AMIODARONE HYDROCHLORIDE 200 MILLIGRAM(S): 400 TABLET ORAL at 05:08

## 2018-07-20 NOTE — PROGRESS NOTE ADULT - PROBLEM SELECTOR PROBLEM 1
ESRD (end stage renal disease) on dialysis
Acute respiratory failure with hypoxia
ESRD (end stage renal disease) on dialysis
Acute respiratory failure with hypoxia

## 2018-07-20 NOTE — PROGRESS NOTE ADULT - ASSESSMENT
71 y/o M with PMH of CAD(s/p 12-14 stents placed in 1990's, and BABAR x 5 placed in Sept 2017), ESRD(on HD M/W/F), Bipolar disorder, COPD, BPH, CVA, DM, HLD, HTN, Afib(off coumadin 2/2 to bleed last admission), CHF(with EF of 20%) presented with the complaint of CHAMPION and SOB. R/o CHF vs fluid overload from ESRD.    +CHAMPION and SOB-s/p IV Lasix 40mg and currently getting dialysis
73 y/o M with PMH of CAD(s/p 12-14 stents placed in 1990's, and BABAR x 5 placed in Sept 2017), ESRD(on HD M/W/F), Bipolar disorder, COPD, BPH, CVA, DM, HLD, HTN, Afib(off coumadin 2/2 to bleed last admission), CHF(with EF of 20%) presented with the complaint of CHAMPION and SOB. R/o CHF vs fluid overload from ESRD.    +CHAMPION and SOB-s/p IV Lasix 40mg and currently getting dialysis
Echo - 6/18- EF moderately decreased      a/p     1) Acute on chronic systolic CHF - clinically improving , continue volume removal with HD, recent echo shows mod LV dysfunction  mild troponin elevation likely sec to ESRD and CHF. cont with BB . no acei or arb due to borderline low bp  2) CAD s/p PCI - on ASA and plavix ,   3) ESRD on HD  4)  Afib - h/o GI bleed and anemia off coumadin last admission
71 y/o M with PMH of CAD(s/p 12-14 stents placed in 1990's, and BABAR x 5 placed in Sept 2017), ESRD(on HD M/W/F), Bipolar disorder, COPD, BPH, CVA, DM, HLD, HTN, Afib(off coumadin 2/2 to bleed last admission), CHF(with EF of 20%) presented with the complaint of CHAMPION and SOB. R/o CHF vs fluid overload from ESRD.    +CHAMPION and SOB-s/p IV Lasix 40mg and currently getting dialysis
73 y/o M with PMH of CAD(s/p 12-14 stents placed in 1990's, and BABAR x 5 placed in Sept 2017), ESRD(on HD M/W/F), Bipolar disorder, COPD, BPH, CVA, DM, HLD, HTN, Afib(off coumadin 2/2 to bleed last admission), CHF(with EF of 20%) presented with the complaint of CHAMPION and SOB. R/o CHF vs fluid overload from ESRD.    +CHAMPION and SOB-s/p IV Lasix 40mg and currently getting dialysis
Echo - 6/18- EF moderately decreased      a/p     1) Acute on chronic systolic CHF - clinically improving , continue volume removal with HD, recent echo shows mod LV dysfunction  mild troponin elevation likely sec to ESRD and CHF. cont with BB . no acei or arb due to borderline low bp  2) CAD s/p PCI - on ASA and plavix ,   3) ESRD on HD  4)  Afib - h/o GI bleed and anemia off coumadin last admission
Pt. with ESRD on HD TIW. Pt. seen during HD today. /56 at time of HD rounds
Pt. with ESRD on HD TIW. Pt. seen during HD today. /64 at time of HD rounds.
Pt. with ESRD on HD TIW. Pt. seen during HD today. /70 at time of HD rounds.
Pt. with ESRD on HD TIW. Pt. seen during HD today. /60 at time of HD rounds
71 y/o M with PMH of CAD(s/p 12-14 stents placed in 1990's, and BABAR x 5 placed in Sept 2017), ESRD(on HD M/W/F), Bipolar disorder, COPD, BPH, CVA, DM, HLD, HTN, Afib(off coumadin 2/2 to bleed last admission), CHF(with EF of 20%) presented with the complaint of CHAMPION and SOB. R/o CHF vs fluid overload from ESRD.    +CHAMPION and SOB-s/p IV Lasix 40mg and currently getting dialysis
71 y/o M with PMH of CAD(s/p 12-14 stents placed in 1990's, and BABAR x 5 placed in Sept 2017), ESRD(on HD M/W/F), Bipolar disorder, COPD, BPH, CVA, DM, HLD, HTN, Afib(off coumadin 2/2 to bleed last admission), CHF(with EF of 20%) presented with the complaint of CHAMPION and SOB. R/o CHF vs fluid overload from ESRD.    +CHAMPION and SOB-s/p IV Lasix 40mg and currently getting dialysis
73 y/o M with PMH of CAD(s/p 12-14 stents placed in 1990's, and BABAR x 5 placed in Sept 2017), ESRD(on HD M/W/F), Bipolar disorder, COPD, BPH, CVA, DM, HLD, HTN, Afib(off coumadin 2/2 to bleed last admission), CHF(with EF of 20%) presented with the complaint of CHAMPION and SOB. R/o CHF vs fluid overload from ESRD.    +CHAMPION and SOB-s/p IV Lasix 40mg and currently getting dialysis

## 2018-07-20 NOTE — PROGRESS NOTE ADULT - PROVIDER SPECIALTY LIST ADULT
Cardiology
Internal Medicine
Internal Medicine
Nephrology
Cardiology
Nephrology
Internal Medicine

## 2018-07-20 NOTE — PROGRESS NOTE ADULT - PROBLEM SELECTOR PLAN 1
Pt. tolerating HD. BP stable during HD rounds. AVG functioning well. Monitor BP and labs
improving with HD
improving with HD  poss dc fater cards clearance  oob in chair
Pt. tolerating HD. BP stable during HD rounds. AVF functioning well. Monitor BP and labs
Pt. tolerating HD. BP stable during HD rounds. RUE AV access functioning well. Monitor BP and labs
Pt. tolerating HD. Pt. clinically stable. BP stable during HD rounds. AVF functioning well. Monitor BP and labs
improving with HD  poss dc fater cards clearance  oob in chair
improving with HD
improving with HD

## 2018-07-20 NOTE — PROGRESS NOTE ADULT - SUBJECTIVE AND OBJECTIVE BOX
Reid Martinez MD  Interventional Cardiology / Advance Heart Failure and Cardiac Transplant Specialist  [place] Office : [address] [place] [address] [address]  Tel:   [place] Office : [address] [facility] [place] [address]  Tel: [phone]  Cell : [phone]    Subjective : Pt lying in bed comfortable, not in distress, denies any chest pain or SOB  	  MEDICATIONS:  amiodarone    Tablet 200 milliGRAM(s) Oral daily  aspirin  chewable 81 milliGRAM(s) Oral daily  clopidogrel Tablet 75 milliGRAM(s) Oral daily  heparin  Injectable 5000 Unit(s) SubCutaneous every 12 hours  metoprolol tartrate 25 milliGRAM(s) Oral two times a day  tamsulosin 0.4 milliGRAM(s) Oral at bedtime  ALBUTerol/ipratropium for Nebulization 3 milliLiter(s) Nebulizer every 6 hours PRN  acetaminophen   Tablet. 650 milliGRAM(s) Oral every 6 hours PRN  busPIRone 5 milliGRAM(s) Oral two times a day  melatonin 3 milliGRAM(s) Oral at bedtime PRN  pantoprazole    Tablet 40 milliGRAM(s) Oral before breakfast  atorvastatin 40 milliGRAM(s) Oral at bedtime  dextrose 40% Gel 15 Gram(s) Oral once PRN  dextrose 50% Injectable 12.5 Gram(s) IV Push once  dextrose 50% Injectable 25 Gram(s) IV Push once  dextrose 50% Injectable 25 Gram(s) IV Push once  glucagon  Injectable 1 milliGRAM(s) IntraMuscular once PRN  insulin lispro (HumaLOG) corrective regimen sliding scale   SubCutaneous three times a day before meals  insulin lispro (HumaLOG) corrective regimen sliding scale   SubCutaneous at bedtime  artificial  tears Solution 1 Drop(s) Both EYES four times a day  dextrose 5%. 1000 milliLiter(s) IV Continuous <Continuous>  multivitamin 1 Tablet(s) Oral daily      PHYSICAL EXAM:  T(C): 36.7 (07-18-18 @ 12:22), Max: 36.7 (07-17-18 @ 21:27)  HR: 75 (07-18-18 @ 17:00) (70 - 83)  BP: 121/70 (07-18-18 @ 17:00) (109/51 - 138/64)  RR: 17 (07-18-18 @ 12:22) (16 - 18)  SpO2: 100% (07-18-18 @ 12:22) (100% - 100%)  Wt(kg): --  I&O's Summary    17 Jul 2018 07:01  -  18 Jul 2018 07:00  --------------------------------------------------------  IN: 200 mL / OUT: 0 mL / NET: 200 mL    18 Jul 2018 07:01  -  18 Jul 2018 17:45  --------------------------------------------------------  IN: 740 mL / OUT: 2000 mL / NET: -1260 mL          Appearance: Normal	  HEENT:   Normal oral mucosa, PERRL, EOMI	  Cardiovascular: Normal S1 S2, No JVD, No murmurs, No edema  Respiratory: Lungs clear to auscultation	  Gastrointestinal:  Soft, Non-tender, + BS	  Extremities: Normal range of motion, No clubbing, cyanosis or edema                                    8.9    6.98  )-----------( 229      ( 18 Jul 2018 06:40 )             28.2     07-18    138  |  94<L>  |  71<H>  ----------------------------<  118<H>  4.0   |  26  |  6.35<H>    Ca    9.3      18 Jul 2018 06:40  Phos  4.8     07-18  Mg     2.3     07-18      proBNP:   Lipid Profile:   HgA1c:   TSH:
chief complaint : shortness of breath     Subjective : Pt lying in bed comfortable, not in distress, denies any chest pain or SOB    MEDICATIONS  (STANDING):  amiodarone    Tablet 200 milliGRAM(s) Oral daily  artificial  tears Solution 1 Drop(s) Both EYES four times a day  aspirin  chewable 81 milliGRAM(s) Oral daily  atorvastatin 40 milliGRAM(s) Oral at bedtime  busPIRone 5 milliGRAM(s) Oral two times a day  clopidogrel Tablet 75 milliGRAM(s) Oral daily  dextrose 5%. 1000 milliLiter(s) (50 mL/Hr) IV Continuous <Continuous>  dextrose 50% Injectable 12.5 Gram(s) IV Push once  dextrose 50% Injectable 25 Gram(s) IV Push once  dextrose 50% Injectable 25 Gram(s) IV Push once  heparin  Injectable 5000 Unit(s) SubCutaneous every 12 hours  insulin lispro (HumaLOG) corrective regimen sliding scale   SubCutaneous three times a day before meals  insulin lispro (HumaLOG) corrective regimen sliding scale   SubCutaneous at bedtime  metoprolol tartrate 25 milliGRAM(s) Oral two times a day  multivitamin 1 Tablet(s) Oral daily  pantoprazole    Tablet 40 milliGRAM(s) Oral before breakfast  tamsulosin 0.4 milliGRAM(s) Oral at bedtime    MEDICATIONS  (PRN):  ALBUTerol/ipratropium for Nebulization 3 milliLiter(s) Nebulizer every 6 hours PRN Shortness of Breath and/or Wheezing  dextrose 40% Gel 15 Gram(s) Oral once PRN Blood Glucose LESS THAN 70 milliGRAM(s)/deciliter  glucagon  Injectable 1 milliGRAM(s) IntraMuscular once PRN Glucose LESS THAN 70 milligrams/deciliter  melatonin 3 milliGRAM(s) Oral at bedtime PRN Insomnia    Vital Signs Last 24 Hrs  T(C): 36.8 (15 Jul 2018 12:45), Max: 36.8 (14 Jul 2018 20:25)  T(F): 98.2 (15 Jul 2018 12:45), Max: 98.3 (14 Jul 2018 20:25)  HR: 70 (15 Jul 2018 15:27) (66 - 77)  BP: 141/63 (15 Jul 2018 12:45) (127/62 - 141/63)  BP(mean): --  RR: 18 (15 Jul 2018 12:45) (17 - 18)  SpO2: 100% (15 Jul 2018 15:27) (96% - 100%)    Constitutional: No fever, fatigue  Skin: No rash.  Eyes: No recent vision problems or eye pain.  ENT: No congestion, ear pain, or sore throat.  Cardiovascular: No chest pain or palpation.  Respiratory: No cough, shortness of breath, congestion, or wheezing.  Gastrointestinal: No abdominal pain, nausea, vomiting, or diarrhea.  Genitourinary: No dysuria.  Musculoskeletal: No joint swelling.  Neurologic: No headache.    Appearance: Normal	  HEENT:   Normal oral mucosa, PERRL, EOMI	  Cardiovascular: Normal S1 S2, No JVD, No murmurs, No edema  Respiratory: dec breath sounds at bases 	  Gastrointestinal:  Soft, Non-tender, + BS	  Extremities: Normal range of motion, No clubbing, cyanosis or edema    LABS:  07-15    142  |  96<L>  |  64<H>  ----------------------------<  131<H>  4.0   |  26  |  5.80<H>    Ca    9.6      15 Jul 2018 07:05  Mg     2.3     07-15      Creatinine Trend: 5.80 <--, 4.30 <--, 4.83 <--, 3.31 <--, 5.09 <--, 4.64 <--                        9.4    8.10  )-----------( 274      ( 15 Jul 2018 07:05 )             30.8     Urine Studies:
chief complaint : shortness of breath     Subjective : Pt lying in bed,  comfortable, not in distress, denies any chest pain or SOB    MEDICATIONS  (STANDING):  amiodarone    Tablet 200 milliGRAM(s) Oral daily  artificial  tears Solution 1 Drop(s) Both EYES four times a day  aspirin  chewable 81 milliGRAM(s) Oral daily  atorvastatin 40 milliGRAM(s) Oral at bedtime  busPIRone 5 milliGRAM(s) Oral two times a day  clopidogrel Tablet 75 milliGRAM(s) Oral daily  dextrose 5%. 1000 milliLiter(s) (50 mL/Hr) IV Continuous <Continuous>  dextrose 50% Injectable 12.5 Gram(s) IV Push once  dextrose 50% Injectable 25 Gram(s) IV Push once  dextrose 50% Injectable 25 Gram(s) IV Push once  heparin  Injectable 5000 Unit(s) SubCutaneous every 12 hours  insulin lispro (HumaLOG) corrective regimen sliding scale   SubCutaneous three times a day before meals  insulin lispro (HumaLOG) corrective regimen sliding scale   SubCutaneous at bedtime  metoprolol tartrate 25 milliGRAM(s) Oral two times a day  multivitamin 1 Tablet(s) Oral daily  pantoprazole    Tablet 40 milliGRAM(s) Oral before breakfast  tamsulosin 0.4 milliGRAM(s) Oral at bedtime    MEDICATIONS  (PRN):  acetaminophen   Tablet. 650 milliGRAM(s) Oral every 6 hours PRN Severe Pain (7 - 10)  ALBUTerol/ipratropium for Nebulization 3 milliLiter(s) Nebulizer every 6 hours PRN Shortness of Breath and/or Wheezing  dextrose 40% Gel 15 Gram(s) Oral once PRN Blood Glucose LESS THAN 70 milliGRAM(s)/deciliter  glucagon  Injectable 1 milliGRAM(s) IntraMuscular once PRN Glucose LESS THAN 70 milligrams/deciliter  melatonin 3 milliGRAM(s) Oral at bedtime PRN Insomnia    Vital Signs Last 24 Hrs  T(C): 36.7 (17 Jul 2018 21:27), Max: 36.8 (17 Jul 2018 12:45)  T(F): 98 (17 Jul 2018 21:27), Max: 98.2 (17 Jul 2018 12:45)  HR: 70 (17 Jul 2018 21:27) (68 - 77)  BP: 111/56 (17 Jul 2018 21:27) (111/56 - 137/65)  BP(mean): --  RR: 18 (17 Jul 2018 21:27) (17 - 18)  SpO2: 100% (17 Jul 2018 21:27) (98% - 100%)    Constitutional: No fever, fatigue  Skin: No rash.  Eyes: No recent vision problems or eye pain.  ENT: No congestion, ear pain, or sore throat.  Cardiovascular: No chest pain or palpation.  Respiratory: No cough, shortness of breath, congestion, or wheezing.  Gastrointestinal: No abdominal pain, nausea, vomiting, or diarrhea.  Genitourinary: No dysuria.  Musculoskeletal: No joint swelling.  Neurologic: No headache.    Appearance: Normal	  HEENT:   Normal oral mucosa, PERRL, EOMI	  Cardiovascular: Normal S1 S2, No JVD, No murmurs, No edema  Respiratory: dec breath sounds at bases 	  Gastrointestinal:  Soft, Non-tender, + BS	  Extremities: Normal range of motion, No clubbing, cyanosis or edema    LABS:  07-17    139  |  96<L>  |  46<H>  ----------------------------<  125<H>  4.2   |  27  |  4.67<H>    Ca    9.5      17 Jul 2018 06:50  Mg     2.3     07-17      Creatinine Trend: 4.67 <--, 6.65 <--, 5.80 <--, 4.30 <--, 4.83 <--, 3.31 <--, 5.09 <--, 4.64 <--                        9.6    6.96  )-----------( 258      ( 17 Jul 2018 06:50 )             31.0     Urine Studies:
Health system DIVISION OF KIDNEY DISEASES AND HYPERTENSION --   --------------------------------------------------------------------------------  Chief Complaint: ESRD/Ongoing hemodialysis requirement    24 hour events/subjective: Pt. seen during HD today. Pt. feels well and denies CP, SOB, HA or dizziness.    PAST HISTORY  --------------------------------------------------------------------------------  No significant changes to PMH, PSH, FHx, SHx, unless otherwise noted    ALLERGIES & MEDICATIONS  --------------------------------------------------------------------------------  Allergies    No Known Allergies    Intolerances    Standing Inpatient Medications  amiodarone    Tablet 200 milliGRAM(s) Oral daily  artificial  tears Solution 1 Drop(s) Both EYES four times a day  aspirin  chewable 81 milliGRAM(s) Oral daily  atorvastatin 40 milliGRAM(s) Oral at bedtime  busPIRone 5 milliGRAM(s) Oral two times a day  clopidogrel Tablet 75 milliGRAM(s) Oral daily  dextrose 5%. 1000 milliLiter(s) IV Continuous <Continuous>  dextrose 50% Injectable 12.5 Gram(s) IV Push once  dextrose 50% Injectable 25 Gram(s) IV Push once  dextrose 50% Injectable 25 Gram(s) IV Push once  heparin  Injectable 5000 Unit(s) SubCutaneous every 12 hours  insulin lispro (HumaLOG) corrective regimen sliding scale   SubCutaneous three times a day before meals  insulin lispro (HumaLOG) corrective regimen sliding scale   SubCutaneous at bedtime  metoprolol tartrate 25 milliGRAM(s) Oral two times a day  multivitamin 1 Tablet(s) Oral daily  pantoprazole    Tablet 40 milliGRAM(s) Oral before breakfast  tamsulosin 0.4 milliGRAM(s) Oral at bedtime    REVIEW OF SYSTEMS  --------------------------------------------------------------------------------  Gen: No  fever  Respiratory: No dyspnea  CV: No chest pain  GI: No abdominal pain  MSK: No edema  Neuro: No dizziness    VITALS/PHYSICAL EXAM  --------------------------------------------------------------------------------  T(C): 36.7 (07-13-18 @ 06:45), Max: 37.7 (07-12-18 @ 12:51)  HR: 82 (07-13-18 @ 06:45) (73 - 88)  BP: 131/65 (07-13-18 @ 06:45) (100/53 - 131/65)  RR: 20 (07-13-18 @ 06:45) (16 - 20)  SpO2: 100% (07-13-18 @ 06:07) (96% - 100%)  Wt(kg): --  Height (cm): 175.26 (07-12-18 @ 16:50)  Weight (kg): 61.7 (07-12-18 @ 07:35)  BMI (kg/m2): 20.1 (07-12-18 @ 16:50)  BSA (m2): 1.75 (07-12-18 @ 16:50)      07-12-18 @ 07:01  -  07-13-18 @ 07:00  --------------------------------------------------------  IN: 720 mL / OUT: 0 mL / NET: 720 mL    Physical Exam:  	Gen: NAD, well-appearing  	HEENT: No JVD  	Pulm: CTA B/L  	CV: S1S2+  	Abd: +BS, sof  	LE: No edema  	Skin: Warm  	Vascular access: RUE AVF site: skin intact     LABS/STUDIES  --------------------------------------------------------------------------------              8.0    7.47  >-----------<  234      [07-13-18 @ 06:45]              26.4     140  |  96  |  55  ----------------------------<  132      [07-13-18 @ 06:45]  3.6   |  29  |  4.83        Ca     9.6     [07-13-18 @ 06:45]      Mg     2.2     [07-12-18 @ 07:59]    TPro  7.6  /  Alb  3.3  /  TBili  0.4  /  DBili  x   /  AST  42  /  ALT  19  /  AlkPhos  94  [07-11-18 @ 14:00]
[facility] DIVISION OF KIDNEY DISEASES AND HYPERTENSION --   --------------------------------------------------------------------------------  Chief Complaint: ESRD/Ongoing hemodialysis requirement    24 hour events/subjective: Pt. seen during HD today. Pt. resting and denies CP or SOB.    PAST HISTORY  --------------------------------------------------------------------------------  No significant changes to PMH, PSH, FHx, SHx, unless otherwise noted    ALLERGIES & MEDICATIONS  --------------------------------------------------------------------------------  Allergies    No Known Allergies    Intolerances    Standing Inpatient Medications  amiodarone    Tablet 200 milliGRAM(s) Oral daily  artificial  tears Solution 1 Drop(s) Both EYES four times a day  aspirin  chewable 81 milliGRAM(s) Oral daily  atorvastatin 40 milliGRAM(s) Oral at bedtime  busPIRone 5 milliGRAM(s) Oral two times a day  clopidogrel Tablet 75 milliGRAM(s) Oral daily  dextrose 5%. 1000 milliLiter(s) IV Continuous <Continuous>  dextrose 50% Injectable 12.5 Gram(s) IV Push once  dextrose 50% Injectable 25 Gram(s) IV Push once  dextrose 50% Injectable 25 Gram(s) IV Push once  heparin  Injectable 5000 Unit(s) SubCutaneous every 12 hours  insulin lispro (HumaLOG) corrective regimen sliding scale   SubCutaneous three times a day before meals  insulin lispro (HumaLOG) corrective regimen sliding scale   SubCutaneous at bedtime  metoprolol tartrate 25 milliGRAM(s) Oral two times a day  multivitamin 1 Tablet(s) Oral daily  pantoprazole    Tablet 40 milliGRAM(s) Oral before breakfast  tamsulosin 0.4 milliGRAM(s) Oral at bedtime    REVIEW OF SYSTEMS  --------------------------------------------------------------------------------  Gen: No fever  Respiratory: No dyspnea  CV: No chest pain  GI: No abdominal pain    VITALS/PHYSICAL EXAM  --------------------------------------------------------------------------------  T(C): 36.7 (07-20-18 @ 06:50), Max: 36.8 (07-19-18 @ 21:03)  HR: 71 (07-20-18 @ 06:50) (67 - 73)  BP: 145/54 (07-20-18 @ 06:50) (121/64 - 145/54)  RR: 16 (07-20-18 @ 06:50) (16 - 18)  SpO2: 100% (07-20-18 @ 04:35) (97% - 100%)  Wt(kg): --    07-19-18 @ 07:01  -  07-20-18 @ 07:00  --------------------------------------------------------  IN: 336 mL / OUT: 0 mL / NET: 336 mL    Physical Exam:  	Gen: resting  	HEENT: No JVD  	Pulm: Fair air entry B/L  	CV: S1S2+  	Abd: Soft  	LE: No edema  	Skin: Warm  	Vascular access: RUE AV access site: skin intact    LABS/STUDIES  --------------------------------------------------------------------------------              8.3    7.38  >-----------<  197      [07-20-18 @ 06:45]              26.9     138  |  96  |  70  ----------------------------<  112      [07-20-18 @ 06:45]  3.9   |  27  |  5.82        Ca     9.3     [07-20-18 @ 06:45]      Mg     2.3     [07-20-18 @ 06:45]    HBsAg NEGATIVE      [07-18-18 @ 06:40]
Reid Martinez MD  Interventional Cardiology / Advance Heart Failure and Cardiac Transplant Specialist  [place] Office : [address] [place] [address] [address]  Tel:   [place] Office : [address] [facility] [place] [address]  Tel: [phone]  Cell : [phone]    Subjective : Pt lying in bed comfortable, not in distress, denies any chest pain or SOB  	  MEDICATIONS:  amiodarone    Tablet 200 milliGRAM(s) Oral daily  aspirin  chewable 81 milliGRAM(s) Oral daily  clopidogrel Tablet 75 milliGRAM(s) Oral daily  heparin  Injectable 5000 Unit(s) SubCutaneous every 12 hours  metoprolol tartrate 25 milliGRAM(s) Oral two times a day  tamsulosin 0.4 milliGRAM(s) Oral at bedtime  ALBUTerol/ipratropium for Nebulization 3 milliLiter(s) Nebulizer every 6 hours PRN  acetaminophen   Tablet. 650 milliGRAM(s) Oral every 6 hours PRN  busPIRone 5 milliGRAM(s) Oral two times a day  melatonin 3 milliGRAM(s) Oral at bedtime PRN  pantoprazole    Tablet 40 milliGRAM(s) Oral before breakfast  atorvastatin 40 milliGRAM(s) Oral at bedtime  dextrose 40% Gel 15 Gram(s) Oral once PRN  dextrose 50% Injectable 12.5 Gram(s) IV Push once  dextrose 50% Injectable 25 Gram(s) IV Push once  dextrose 50% Injectable 25 Gram(s) IV Push once  glucagon  Injectable 1 milliGRAM(s) IntraMuscular once PRN  insulin lispro (HumaLOG) corrective regimen sliding scale   SubCutaneous three times a day before meals  insulin lispro (HumaLOG) corrective regimen sliding scale   SubCutaneous at bedtime  artificial  tears Solution 1 Drop(s) Both EYES four times a day  dextrose 5%. 1000 milliLiter(s) IV Continuous <Continuous>  multivitamin 1 Tablet(s) Oral daily      PHYSICAL EXAM:  T(C): 36.9 (07-20-18 @ 13:01), Max: 36.9 (07-20-18 @ 13:01)  HR: 75 (07-20-18 @ 15:22) (68 - 97)  BP: 135/62 (07-20-18 @ 13:01) (135/62 - 145/54)  RR: 17 (07-20-18 @ 13:01) (16 - 17)  SpO2: 100% (07-20-18 @ 15:22) (98% - 100%)  Wt(kg): --  I&O's Summary    19 Jul 2018 07:01  -  20 Jul 2018 07:00  --------------------------------------------------------  IN: 336 mL / OUT: 0 mL / NET: 336 mL    20 Jul 2018 07:01  -  20 Jul 2018 22:58  --------------------------------------------------------  IN: 620 mL / OUT: 1500 mL / NET: -880 mL          Appearance: Normal	  HEENT:   Normal oral mucosa, PERRL, EOMI	  Cardiovascular: Normal S1 S2, No JVD, No murmurs, No edema  Respiratory: Lungs clear to auscultation	  Gastrointestinal:  Soft, Non-tender, + BS	  Extremities: Normal range of motion, No clubbing, cyanosis or edema                                    8.3    7.38  )-----------( 197      ( 20 Jul 2018 06:45 )             26.9     07-20    138  |  96<L>  |  70<H>  ----------------------------<  112<H>  3.9   |  27  |  5.82<H>    Ca    9.3      20 Jul 2018 06:45  Mg     2.3     07-20      proBNP:   Lipid Profile:   HgA1c:   TSH:
Reid Martinez MD  Interventional Cardiology / Advance Heart Failure and Cardiac Transplant Specialist  [place] Office : [address] [place] [address] [address] [address]  Tel:   [place] Office : [address] [facility] [place] [address]  Tel: [phone]  Cell : [phone]    Subjective : Pt lying in bed comfortable, not in distress, denies any chest pain or SOB  	  MEDICATIONS:  amiodarone    Tablet 200 milliGRAM(s) Oral daily  aspirin  chewable 81 milliGRAM(s) Oral daily  clopidogrel Tablet 75 milliGRAM(s) Oral daily  heparin  Injectable 5000 Unit(s) SubCutaneous every 12 hours  metoprolol tartrate 25 milliGRAM(s) Oral two times a day  tamsulosin 0.4 milliGRAM(s) Oral at bedtime      ALBUTerol/ipratropium for Nebulization 3 milliLiter(s) Nebulizer every 6 hours PRN    acetaminophen   Tablet. 650 milliGRAM(s) Oral every 6 hours PRN  busPIRone 5 milliGRAM(s) Oral two times a day  melatonin 3 milliGRAM(s) Oral at bedtime PRN    pantoprazole    Tablet 40 milliGRAM(s) Oral before breakfast    atorvastatin 40 milliGRAM(s) Oral at bedtime  dextrose 40% Gel 15 Gram(s) Oral once PRN  dextrose 50% Injectable 12.5 Gram(s) IV Push once  dextrose 50% Injectable 25 Gram(s) IV Push once  dextrose 50% Injectable 25 Gram(s) IV Push once  glucagon  Injectable 1 milliGRAM(s) IntraMuscular once PRN  insulin lispro (HumaLOG) corrective regimen sliding scale   SubCutaneous three times a day before meals  insulin lispro (HumaLOG) corrective regimen sliding scale   SubCutaneous at bedtime    artificial  tears Solution 1 Drop(s) Both EYES four times a day  dextrose 5%. 1000 milliLiter(s) IV Continuous <Continuous>  multivitamin 1 Tablet(s) Oral daily      PHYSICAL EXAM:  T(C): 36.7 (07-16-18 @ 21:29), Max: 37.2 (07-16-18 @ 12:34)  HR: 68 (07-16-18 @ 21:29) (67 - 81)  BP: 108/56 (07-16-18 @ 21:29) (108/56 - 156/72)  RR: 18 (07-16-18 @ 21:29) (17 - 18)  SpO2: 100% (07-16-18 @ 21:29) (95% - 100%)  Wt(kg): --  I&O's Summary    15 Jul 2018 07:01  -  16 Jul 2018 07:00  --------------------------------------------------------  IN: 240 mL / OUT: 0 mL / NET: 240 mL    16 Jul 2018 07:01  -  16 Jul 2018 21:46  --------------------------------------------------------  IN: 500 mL / OUT: 2400 mL / NET: -1900 mL          Appearance: Normal	  HEENT:   Normal oral mucosa, PERRL, EOMI	  Cardiovascular: Normal S1 S2, No JVD, No murmurs, No edema  Respiratory: Lungs clear to auscultation	  Gastrointestinal:  Soft, Non-tender, + BS	  Extremities: Normal range of motion, No clubbing, cyanosis or edema                                    8.4    7.75  )-----------( 238      ( 16 Jul 2018 07:00 )             26.9     07-16    139  |  96<L>  |  77<H>  ----------------------------<  130<H>  4.0   |  23  |  6.65<H>    Ca    9.3      16 Jul 2018 07:00  Mg     2.3     07-16      proBNP:   Lipid Profile:   HgA1c:   TSH:
Reid Martinez MD  Interventional Cardiology / Advance Heart Failure and Cardiac Transplant Specialist  [place] Office : [address] [place] [address] [place]. [address]  Tel:   [place] Office : [address] [facility] [place] [address]  Tel: [phone]  Cell : [phone]    Subjective : Pt lying in bed comfortable, not in distress, denies any chest pain or SOB  	  MEDICATIONS:  amiodarone    Tablet 200 milliGRAM(s) Oral daily  aspirin  chewable 81 milliGRAM(s) Oral daily  clopidogrel Tablet 75 milliGRAM(s) Oral daily  heparin  Injectable 5000 Unit(s) SubCutaneous every 12 hours  metoprolol tartrate 25 milliGRAM(s) Oral two times a day  tamsulosin 0.4 milliGRAM(s) Oral at bedtime      ALBUTerol/ipratropium for Nebulization 3 milliLiter(s) Nebulizer every 6 hours PRN    acetaminophen   Tablet. 650 milliGRAM(s) Oral every 6 hours PRN  busPIRone 5 milliGRAM(s) Oral two times a day  melatonin 3 milliGRAM(s) Oral at bedtime PRN    pantoprazole    Tablet 40 milliGRAM(s) Oral before breakfast    atorvastatin 40 milliGRAM(s) Oral at bedtime  dextrose 40% Gel 15 Gram(s) Oral once PRN  dextrose 50% Injectable 12.5 Gram(s) IV Push once  dextrose 50% Injectable 25 Gram(s) IV Push once  dextrose 50% Injectable 25 Gram(s) IV Push once  glucagon  Injectable 1 milliGRAM(s) IntraMuscular once PRN  insulin lispro (HumaLOG) corrective regimen sliding scale   SubCutaneous three times a day before meals  insulin lispro (HumaLOG) corrective regimen sliding scale   SubCutaneous at bedtime    artificial  tears Solution 1 Drop(s) Both EYES four times a day  dextrose 5%. 1000 milliLiter(s) IV Continuous <Continuous>  multivitamin 1 Tablet(s) Oral daily      PHYSICAL EXAM:  T(C): 36.8 (07-17-18 @ 12:45), Max: 37 (07-16-18 @ 16:49)  HR: 71 (07-17-18 @ 12:45) (67 - 77)  BP: 122/63 (07-17-18 @ 12:45) (108/56 - 122/63)  RR: 17 (07-17-18 @ 12:45) (17 - 18)  SpO2: 100% (07-17-18 @ 12:45) (98% - 100%)  Wt(kg): --  I&O's Summary    16 Jul 2018 07:01  -  17 Jul 2018 07:00  --------------------------------------------------------  IN: 807 mL / OUT: 2400 mL / NET: -1593 mL          Appearance: Normal	  HEENT:   Normal oral mucosa, PERRL, EOMI	  Cardiovascular: Normal S1 S2, No JVD, No murmurs, No edema  Respiratory: Lungs clear to auscultation	  Gastrointestinal:  Soft, Non-tender, + BS	  Extremities: Normal range of motion, No clubbing, cyanosis or edema                                    9.6    6.96  )-----------( 258      ( 17 Jul 2018 06:50 )             31.0     07-17    139  |  96<L>  |  46<H>  ----------------------------<  125<H>  4.2   |  27  |  4.67<H>    Ca    9.5      17 Jul 2018 06:50  Mg     2.3     07-17      proBNP:   Lipid Profile:   HgA1c:   TSH:
Reid Martinez MD  Interventional Cardiology / Advance Heart Failure and Cardiac Transplant Specialist  [place] Office : [address] [place] [address]. [address]  Tel:   [place] Office : [address] [facility] [place] [address]  Tel: [phone]  Cell : [phone]    Subjective : Pt lying in bed comfortable, not in distress, denies any chest pain or SOB  	  MEDICATIONS:  amiodarone    Tablet 200 milliGRAM(s) Oral daily  aspirin  chewable 81 milliGRAM(s) Oral daily  clopidogrel Tablet 75 milliGRAM(s) Oral daily  heparin  Injectable 5000 Unit(s) SubCutaneous every 12 hours  metoprolol tartrate 25 milliGRAM(s) Oral two times a day  tamsulosin 0.4 milliGRAM(s) Oral at bedtime      ALBUTerol/ipratropium for Nebulization 3 milliLiter(s) Nebulizer every 6 hours PRN    busPIRone 5 milliGRAM(s) Oral two times a day  melatonin 3 milliGRAM(s) Oral at bedtime PRN    pantoprazole    Tablet 40 milliGRAM(s) Oral before breakfast    atorvastatin 40 milliGRAM(s) Oral at bedtime  dextrose 40% Gel 15 Gram(s) Oral once PRN  dextrose 50% Injectable 12.5 Gram(s) IV Push once  dextrose 50% Injectable 25 Gram(s) IV Push once  dextrose 50% Injectable 25 Gram(s) IV Push once  glucagon  Injectable 1 milliGRAM(s) IntraMuscular once PRN  insulin lispro (HumaLOG) corrective regimen sliding scale   SubCutaneous three times a day before meals  insulin lispro (HumaLOG) corrective regimen sliding scale   SubCutaneous at bedtime    artificial  tears Solution 1 Drop(s) Both EYES four times a day  dextrose 5%. 1000 milliLiter(s) IV Continuous <Continuous>  multivitamin 1 Tablet(s) Oral daily      PHYSICAL EXAM:  T(C): 36.8 (07-15-18 @ 12:45), Max: 36.8 (07-14-18 @ 20:25)  HR: 73 (07-15-18 @ 12:45) (66 - 77)  BP: 141/63 (07-15-18 @ 12:45) (127/62 - 141/63)  RR: 18 (07-15-18 @ 12:45) (17 - 18)  SpO2: 100% (07-15-18 @ 12:45) (96% - 100%)  Wt(kg): --  I&O's Summary    14 Jul 2018 07:01  -  15 Jul 2018 07:00  --------------------------------------------------------  IN: 207 mL / OUT: 0 mL / NET: 207 mL          Appearance: Normal	  HEENT:   Normal oral mucosa, PERRL, EOMI	  Cardiovascular: Normal S1 S2, No JVD, No murmurs, No edema  Respiratory: Lungs clear to auscultation	  Gastrointestinal:  Soft, Non-tender, + BS	  Extremities: Normal range of motion, No clubbing, cyanosis or edema                                    9.4    8.10  )-----------( 274      ( 15 Jul 2018 07:05 )             30.8     07-15    142  |  96<L>  |  64<H>  ----------------------------<  131<H>  4.0   |  26  |  5.80<H>    Ca    9.6      15 Jul 2018 07:05  Mg     2.3     07-15      proBNP:   Lipid Profile:   HgA1c:   TSH:
Reid Martinez MD  Interventional Cardiology / Advance Heart Failure and Cardiac Transplant Specialist  [place] Office : [address] [place] [address]  Tel:   [place] Office : [address] [facility] [address] [address]  Tel: [phone]  Cell : [phone]    Subjective : Pt lying in bed comfortable, not in distress, denies any chest pain or SOB  	  MEDICATIONS:  amiodarone    Tablet 200 milliGRAM(s) Oral daily  aspirin  chewable 81 milliGRAM(s) Oral daily  clopidogrel Tablet 75 milliGRAM(s) Oral daily  heparin  Injectable 5000 Unit(s) SubCutaneous every 12 hours  metoprolol tartrate 25 milliGRAM(s) Oral two times a day  tamsulosin 0.4 milliGRAM(s) Oral at bedtime      ALBUTerol/ipratropium for Nebulization 3 milliLiter(s) Nebulizer every 6 hours PRN    busPIRone 5 milliGRAM(s) Oral two times a day    pantoprazole    Tablet 40 milliGRAM(s) Oral before breakfast    atorvastatin 40 milliGRAM(s) Oral at bedtime  dextrose 40% Gel 15 Gram(s) Oral once PRN  dextrose 50% Injectable 12.5 Gram(s) IV Push once  dextrose 50% Injectable 25 Gram(s) IV Push once  dextrose 50% Injectable 25 Gram(s) IV Push once  glucagon  Injectable 1 milliGRAM(s) IntraMuscular once PRN  insulin lispro (HumaLOG) corrective regimen sliding scale   SubCutaneous three times a day before meals  insulin lispro (HumaLOG) corrective regimen sliding scale   SubCutaneous at bedtime    artificial  tears Solution 1 Drop(s) Both EYES four times a day  dextrose 5%. 1000 milliLiter(s) IV Continuous <Continuous>  multivitamin 1 Tablet(s) Oral daily      PHYSICAL EXAM:  T(C): 36.8 (07-13-18 @ 11:07), Max: 37.7 (07-12-18 @ 12:51)  HR: 80 (07-13-18 @ 11:07) (73 - 88)  BP: 110/58 (07-13-18 @ 11:07) (100/53 - 131/65)  RR: 20 (07-13-18 @ 11:07) (16 - 20)  SpO2: 100% (07-13-18 @ 11:07) (96% - 100%)  Wt(kg): --  I&O's Summary    12 Jul 2018 07:01  -  13 Jul 2018 07:00  --------------------------------------------------------  IN: 720 mL / OUT: 0 mL / NET: 720 mL      Height (cm): 175.26 (07-12 @ 16:50)    Appearance: Normal	  HEENT:   Normal oral mucosa, PERRL, EOMI	  Cardiovascular: Normal S1 S2, No JVD, No murmurs, No edema  Respiratory: Lungs clear to auscultation	  Gastrointestinal:  Soft, Non-tender, + BS	  Extremities: Normal range of motion, No clubbing, cyanosis or edema                                    8.0    7.47  )-----------( 234      ( 13 Jul 2018 06:45 )             26.4     07-13    140  |  96<L>  |  55<H>  ----------------------------<  132<H>  3.6   |  29  |  4.83<H>    Ca    9.6      13 Jul 2018 06:45  Mg     2.2     07-12    TPro  7.6  /  Alb  3.3  /  TBili  0.4  /  DBili  x   /  AST  42<H>  /  ALT  19  /  AlkPhos  94  07-11    proBNP:   Lipid Profile:   HgA1c:   TSH:
Reid Martinez MD  Interventional Cardiology / Advance Heart Failure and Cardiac Transplant Specialist  [place] Office : [address] [place] [address]  Tel:   [place] Office : [address] [facility] [address] [address]  Tel: [phone]  Cell : [phone]    Subjective : Pt lying in bed comfortable, not in distress, denies any chest pain or SOB  	  MEDICATIONS:  amiodarone    Tablet 200 milliGRAM(s) Oral daily  aspirin  chewable 81 milliGRAM(s) Oral daily  clopidogrel Tablet 75 milliGRAM(s) Oral daily  heparin  Injectable 5000 Unit(s) SubCutaneous every 12 hours  metoprolol tartrate 25 milliGRAM(s) Oral two times a day  tamsulosin 0.4 milliGRAM(s) Oral at bedtime      ALBUTerol/ipratropium for Nebulization 3 milliLiter(s) Nebulizer every 6 hours PRN    busPIRone 5 milliGRAM(s) Oral two times a day    pantoprazole    Tablet 40 milliGRAM(s) Oral before breakfast    atorvastatin 40 milliGRAM(s) Oral at bedtime  dextrose 40% Gel 15 Gram(s) Oral once PRN  dextrose 50% Injectable 12.5 Gram(s) IV Push once  dextrose 50% Injectable 25 Gram(s) IV Push once  dextrose 50% Injectable 25 Gram(s) IV Push once  glucagon  Injectable 1 milliGRAM(s) IntraMuscular once PRN  insulin lispro (HumaLOG) corrective regimen sliding scale   SubCutaneous three times a day before meals  insulin lispro (HumaLOG) corrective regimen sliding scale   SubCutaneous at bedtime    artificial  tears Solution 1 Drop(s) Both EYES four times a day  dextrose 5%. 1000 milliLiter(s) IV Continuous <Continuous>  multivitamin 1 Tablet(s) Oral daily      PHYSICAL EXAM:  T(C): 36.8 (07-14-18 @ 13:36), Max: 36.8 (07-14-18 @ 13:36)  HR: 76 (07-14-18 @ 13:36) (70 - 88)  BP: 121/62 (07-14-18 @ 13:36) (106/55 - 136/61)  RR: 18 (07-14-18 @ 13:36) (18 - 20)  SpO2: 100% (07-14-18 @ 13:36) (96% - 100%)  Wt(kg): --  I&O's Summary    13 Jul 2018 07:01  -  14 Jul 2018 07:00  --------------------------------------------------------  IN: 707 mL / OUT: 2900 mL / NET: -2193 mL          Appearance: Normal	  HEENT:   Normal oral mucosa, PERRL, EOMI	  Cardiovascular: Normal S1 S2, No JVD, No murmurs, No edema  Respiratory: Lungs clear to auscultation	  Gastrointestinal:  Soft, Non-tender, + BS	  Extremities: Normal range of motion, No clubbing, cyanosis or edema                                    8.6    7.80  )-----------( 268      ( 14 Jul 2018 06:35 )             28.3     07-14    139  |  96<L>  |  45<H>  ----------------------------<  128<H>  3.4<L>   |  28  |  4.30<H>    Ca    9.6      14 Jul 2018 06:55  Mg     2.1     07-14      proBNP: Serum Pro-Brain Natriuretic Peptide: 12660 pg/mL (07-14 @ 06:55)    Lipid Profile:   HgA1c:   TSH:
Reid Martinez MD  Interventional Cardiology / Advance Heart Failure and Cardiac Transplant Specialist  [place] Office : [address] [place] [address] [address] [address]  Tel:   [place] Office : [address] [facility] [place] [address]  Tel: [phone]  Cell : [phone]    Subjective : Pt lying in bed comfortable, not in distress, denies any chest pain or SOB  	  MEDICATIONS:  amiodarone    Tablet 200 milliGRAM(s) Oral daily  aspirin  chewable 81 milliGRAM(s) Oral daily  clopidogrel Tablet 75 milliGRAM(s) Oral daily  heparin  Injectable 5000 Unit(s) SubCutaneous every 12 hours  metoprolol tartrate 25 milliGRAM(s) Oral two times a day  tamsulosin 0.4 milliGRAM(s) Oral at bedtime  ALBUTerol/ipratropium for Nebulization 3 milliLiter(s) Nebulizer every 6 hours PRN  acetaminophen   Tablet. 650 milliGRAM(s) Oral every 6 hours PRN  busPIRone 5 milliGRAM(s) Oral two times a day  melatonin 3 milliGRAM(s) Oral at bedtime PRN  pantoprazole    Tablet 40 milliGRAM(s) Oral before breakfast  atorvastatin 40 milliGRAM(s) Oral at bedtime  dextrose 40% Gel 15 Gram(s) Oral once PRN  dextrose 50% Injectable 12.5 Gram(s) IV Push once  dextrose 50% Injectable 25 Gram(s) IV Push once  dextrose 50% Injectable 25 Gram(s) IV Push once  glucagon  Injectable 1 milliGRAM(s) IntraMuscular once PRN  insulin lispro (HumaLOG) corrective regimen sliding scale   SubCutaneous three times a day before meals  insulin lispro (HumaLOG) corrective regimen sliding scale   SubCutaneous at bedtime  artificial  tears Solution 1 Drop(s) Both EYES four times a day  dextrose 5%. 1000 milliLiter(s) IV Continuous <Continuous>  multivitamin 1 Tablet(s) Oral daily      PHYSICAL EXAM:  T(C): 36.8 (07-19-18 @ 21:03), Max: 36.9 (07-19-18 @ 05:06)  HR: 67 (07-19-18 @ 21:03) (67 - 73)  BP: 134/71 (07-19-18 @ 21:03) (121/64 - 137/67)  RR: 18 (07-19-18 @ 21:03) (16 - 18)  SpO2: 100% (07-19-18 @ 21:03) (97% - 100%)  Wt(kg): --  I&O's Summary    18 Jul 2018 07:01  -  19 Jul 2018 07:00  --------------------------------------------------------  IN: 980 mL / OUT: 2000 mL / NET: -1020 mL    19 Jul 2018 07:01  -  19 Jul 2018 23:51  --------------------------------------------------------  IN: 236 mL / OUT: 0 mL / NET: 236 mL          Appearance: Normal	  HEENT:   Normal oral mucosa, PERRL, EOMI	  Cardiovascular: Normal S1 S2, No JVD, No murmurs, No edema  Respiratory: Lungs clear to auscultation	  Gastrointestinal:  Soft, Non-tender, + BS	  Extremities: Normal range of motion, No clubbing, cyanosis or edema                                    9.9    7.29  )-----------( 243      ( 19 Jul 2018 05:45 )             32.8     07-19    139  |  94<L>  |  43<H>  ----------------------------<  100<H>  4.0   |  28  |  4.27<H>    Ca    9.7      19 Jul 2018 05:45  Phos  4.8     07-18  Mg     2.3     07-19      proBNP:   Lipid Profile:   HgA1c:   TSH:
[facility] DIVISION OF KIDNEY DISEASES AND HYPERTENSION --   --------------------------------------------------------------------------------  Chief Complaint: ESRD/Ongoing hemodialysis requirement    24 hour events/subjective: Pt. seen during HD today. Pt. resting and denies CP or SOB.    PAST HISTORY  --------------------------------------------------------------------------------  No significant changes to PMH, PSH, FHx, SHx, unless otherwise noted    ALLERGIES & MEDICATIONS  --------------------------------------------------------------------------------  Allergies    No Known Allergies    Intolerances    Standing Inpatient Medications  amiodarone    Tablet 200 milliGRAM(s) Oral daily  artificial  tears Solution 1 Drop(s) Both EYES four times a day  aspirin  chewable 81 milliGRAM(s) Oral daily  atorvastatin 40 milliGRAM(s) Oral at bedtime  busPIRone 5 milliGRAM(s) Oral two times a day  clopidogrel Tablet 75 milliGRAM(s) Oral daily  dextrose 5%. 1000 milliLiter(s) IV Continuous <Continuous>  dextrose 50% Injectable 12.5 Gram(s) IV Push once  dextrose 50% Injectable 25 Gram(s) IV Push once  dextrose 50% Injectable 25 Gram(s) IV Push once  heparin  Injectable 5000 Unit(s) SubCutaneous every 12 hours  insulin lispro (HumaLOG) corrective regimen sliding scale   SubCutaneous three times a day before meals  insulin lispro (HumaLOG) corrective regimen sliding scale   SubCutaneous at bedtime  metoprolol tartrate 25 milliGRAM(s) Oral two times a day  multivitamin 1 Tablet(s) Oral daily  pantoprazole    Tablet 40 milliGRAM(s) Oral before breakfast  tamsulosin 0.4 milliGRAM(s) Oral at bedtime      REVIEW OF SYSTEMS  --------------------------------------------------------------------------------  Limited ROS. See HPI    VITALS/PHYSICAL EXAM  --------------------------------------------------------------------------------  T(C): 36.6 (07-16-18 @ 07:00), Max: 36.8 (07-15-18 @ 12:45)  HR: 73 (07-16-18 @ 07:00) (69 - 77)  BP: 132/62 (07-16-18 @ 07:00) (126/65 - 141/63)  RR: 18 (07-16-18 @ 07:00) (18 - 18)  SpO2: 100% (07-16-18 @ 07:00) (95% - 100%)  Wt(kg): --    07-15-18 @ 07:01  -  07-16-18 @ 07:00  --------------------------------------------------------  IN: 240 mL / OUT: 0 mL / NET: 240 mL    Physical Exam:  	Gen: resting  	HEENT: No JVD  	Pulm: Fair air entry B/L  	CV: S1S2+  	Abd: Soft  	LE: No edema  	Skin: Warm  	Vascular access: RUE AVF site: skin intact     LABS/STUDIES  --------------------------------------------------------------------------------              8.4    7.75  >-----------<  238      [07-16-18 @ 07:00]              26.9     139  |  96  |  77  ----------------------------<  130      [07-16-18 @ 07:00]  4.0   |  23  |  6.65        Ca     9.3     [07-16-18 @ 07:00]      Mg     2.3     [07-16-18 @ 07:00]
chief complaint : shortness of breath     Subjective : Pt lying in bed,  comfortable, not in distress, denies any chest pain or SOB    MEDICATIONS  (STANDING):  amiodarone    Tablet 200 milliGRAM(s) Oral daily  artificial  tears Solution 1 Drop(s) Both EYES four times a day  aspirin  chewable 81 milliGRAM(s) Oral daily  atorvastatin 40 milliGRAM(s) Oral at bedtime  busPIRone 5 milliGRAM(s) Oral two times a day  clopidogrel Tablet 75 milliGRAM(s) Oral daily  dextrose 5%. 1000 milliLiter(s) (50 mL/Hr) IV Continuous <Continuous>  dextrose 50% Injectable 12.5 Gram(s) IV Push once  dextrose 50% Injectable 25 Gram(s) IV Push once  dextrose 50% Injectable 25 Gram(s) IV Push once  heparin  Injectable 5000 Unit(s) SubCutaneous every 12 hours  insulin lispro (HumaLOG) corrective regimen sliding scale   SubCutaneous three times a day before meals  insulin lispro (HumaLOG) corrective regimen sliding scale   SubCutaneous at bedtime  metoprolol tartrate 25 milliGRAM(s) Oral two times a day  multivitamin 1 Tablet(s) Oral daily  pantoprazole    Tablet 40 milliGRAM(s) Oral before breakfast  tamsulosin 0.4 milliGRAM(s) Oral at bedtime    MEDICATIONS  (PRN):  acetaminophen   Tablet. 650 milliGRAM(s) Oral every 6 hours PRN Severe Pain (7 - 10)  ALBUTerol/ipratropium for Nebulization 3 milliLiter(s) Nebulizer every 6 hours PRN Shortness of Breath and/or Wheezing  dextrose 40% Gel 15 Gram(s) Oral once PRN Blood Glucose LESS THAN 70 milliGRAM(s)/deciliter  glucagon  Injectable 1 milliGRAM(s) IntraMuscular once PRN Glucose LESS THAN 70 milligrams/deciliter  melatonin 3 milliGRAM(s) Oral at bedtime PRN Insomnia    Vital Signs Last 24 Hrs  T(C): 36.7 (18 Jul 2018 12:22), Max: 36.7 (18 Jul 2018 12:22)  T(F): 98.1 (18 Jul 2018 12:22), Max: 98.1 (18 Jul 2018 12:22)  HR: 75 (18 Jul 2018 17:00) (70 - 83)  BP: 121/70 (18 Jul 2018 17:00) (109/51 - 138/64)  BP(mean): --  RR: 17 (18 Jul 2018 12:22) (16 - 18)  SpO2: 100% (18 Jul 2018 12:22) (100% - 100%)    Constitutional: No fever, fatigue  Skin: No rash.  Eyes: No recent vision problems or eye pain.  ENT: No congestion, ear pain, or sore throat.  Cardiovascular: No chest pain or palpation.  Respiratory: No cough, shortness of breath, congestion, or wheezing.  Gastrointestinal: No abdominal pain, nausea, vomiting, or diarrhea.  Genitourinary: No dysuria.  Musculoskeletal: No joint swelling.  Neurologic: No headache.    Appearance: Normal	  HEENT:   Normal oral mucosa, PERRL, EOMI	  Cardiovascular: Normal S1 S2, No JVD, No murmurs, No edema  Respiratory: dec breath sounds at bases 	  Gastrointestinal:  Soft, Non-tender, + BS	  Extremities: Normal range of motion, No clubbing, cyanosis or edema    LABS:  07-18    138  |  94<L>  |  71<H>  ----------------------------<  118<H>  4.0   |  26  |  6.35<H>    Ca    9.3      18 Jul 2018 06:40  Phos  4.8     07-18  Mg     2.3     07-18      Creatinine Trend: 6.35 <--, 4.67 <--, 6.65 <--, 5.80 <--, 4.30 <--, 4.83 <--, 3.31 <--                        8.9    6.98  )-----------( 229      ( 18 Jul 2018 06:40 )             28.2     Urine Studies:
chief complaint : shortness of breath     Subjective : Pt lying in bed,  comfortable, not in distress, denies any chest pain or SOB    MEDICATIONS  (STANDING):  amiodarone    Tablet 200 milliGRAM(s) Oral daily  artificial  tears Solution 1 Drop(s) Both EYES four times a day  aspirin  chewable 81 milliGRAM(s) Oral daily  atorvastatin 40 milliGRAM(s) Oral at bedtime  busPIRone 5 milliGRAM(s) Oral two times a day  clopidogrel Tablet 75 milliGRAM(s) Oral daily  dextrose 5%. 1000 milliLiter(s) (50 mL/Hr) IV Continuous <Continuous>  dextrose 50% Injectable 12.5 Gram(s) IV Push once  dextrose 50% Injectable 25 Gram(s) IV Push once  dextrose 50% Injectable 25 Gram(s) IV Push once  heparin  Injectable 5000 Unit(s) SubCutaneous every 12 hours  insulin lispro (HumaLOG) corrective regimen sliding scale   SubCutaneous three times a day before meals  insulin lispro (HumaLOG) corrective regimen sliding scale   SubCutaneous at bedtime  metoprolol tartrate 25 milliGRAM(s) Oral two times a day  multivitamin 1 Tablet(s) Oral daily  pantoprazole    Tablet 40 milliGRAM(s) Oral before breakfast  tamsulosin 0.4 milliGRAM(s) Oral at bedtime    MEDICATIONS  (PRN):  acetaminophen   Tablet. 650 milliGRAM(s) Oral every 6 hours PRN Severe Pain (7 - 10)  ALBUTerol/ipratropium for Nebulization 3 milliLiter(s) Nebulizer every 6 hours PRN Shortness of Breath and/or Wheezing  dextrose 40% Gel 15 Gram(s) Oral once PRN Blood Glucose LESS THAN 70 milliGRAM(s)/deciliter  glucagon  Injectable 1 milliGRAM(s) IntraMuscular once PRN Glucose LESS THAN 70 milligrams/deciliter  melatonin 3 milliGRAM(s) Oral at bedtime PRN Insomnia    Vital Signs Last 24 Hrs  T(C): 36.8 (19 Jul 2018 21:03), Max: 36.9 (19 Jul 2018 05:06)  T(F): 98.2 (19 Jul 2018 21:03), Max: 98.5 (19 Jul 2018 05:06)  HR: 67 (19 Jul 2018 21:03) (67 - 73)  BP: 134/71 (19 Jul 2018 21:03) (121/64 - 137/67)  BP(mean): --  RR: 18 (19 Jul 2018 21:03) (16 - 18)  SpO2: 100% (19 Jul 2018 21:03) (97% - 100%)    Constitutional: No fever, fatigue  Skin: No rash.  Eyes: No recent vision problems or eye pain.  ENT: No congestion, ear pain, or sore throat.  Cardiovascular: No chest pain or palpation.  Respiratory: No cough, shortness of breath, congestion, or wheezing.  Gastrointestinal: No abdominal pain, nausea, vomiting, or diarrhea.  Genitourinary: No dysuria.  Musculoskeletal: No joint swelling.  Neurologic: No headache.    Appearance: Normal	  HEENT:   Normal oral mucosa, PERRL, EOMI	  Cardiovascular: Normal S1 S2, No JVD, No murmurs, No edema  Respiratory: dec breath sounds at bases 	  Gastrointestinal:  Soft, Non-tender, + BS	  Extremities: Normal range of motion, No clubbing, cyanosis or edema    LABS:  07-19    139  |  94<L>  |  43<H>  ----------------------------<  100<H>  4.0   |  28  |  4.27<H>    Ca    9.7      19 Jul 2018 05:45  Phos  4.8     07-18  Mg     2.3     07-19      Creatinine Trend: 4.27 <--, 6.35 <--, 4.67 <--, 6.65 <--, 5.80 <--, 4.30 <--, 4.83 <--                        9.9    7.29  )-----------( 243      ( 19 Jul 2018 05:45 )             32.8     Urine Studies:
[facility] DIVISION OF KIDNEY DISEASES AND HYPERTENSION --   --------------------------------------------------------------------------------  Chief Complaint: ESRD/Ongoing hemodialysis requirement    24 hour events/subjective: Pt. seen during HD today. Pt. resting and denies CP or SOB.    PAST HISTORY  --------------------------------------------------------------------------------  No significant changes to PMH, PSH, FHx, SHx, unless otherwise noted    ALLERGIES & MEDICATIONS  --------------------------------------------------------------------------------  Allergies    No Known Allergies    Intolerances    Standing Inpatient Medications  amiodarone    Tablet 200 milliGRAM(s) Oral daily  artificial  tears Solution 1 Drop(s) Both EYES four times a day  aspirin  chewable 81 milliGRAM(s) Oral daily  atorvastatin 40 milliGRAM(s) Oral at bedtime  busPIRone 5 milliGRAM(s) Oral two times a day  clopidogrel Tablet 75 milliGRAM(s) Oral daily  dextrose 5%. 1000 milliLiter(s) IV Continuous <Continuous>  dextrose 50% Injectable 12.5 Gram(s) IV Push once  dextrose 50% Injectable 25 Gram(s) IV Push once  dextrose 50% Injectable 25 Gram(s) IV Push once  heparin  Injectable 5000 Unit(s) SubCutaneous every 12 hours  insulin lispro (HumaLOG) corrective regimen sliding scale   SubCutaneous three times a day before meals  insulin lispro (HumaLOG) corrective regimen sliding scale   SubCutaneous at bedtime  metoprolol tartrate 25 milliGRAM(s) Oral two times a day  multivitamin 1 Tablet(s) Oral daily  pantoprazole    Tablet 40 milliGRAM(s) Oral before breakfast  tamsulosin 0.4 milliGRAM(s) Oral at bedtime    REVIEW OF SYSTEMS  --------------------------------------------------------------------------------  Gen: No fever  Respiratory: No dyspnea  CV: No chest pain  GI: No abdominal pain    VITALS/PHYSICAL EXAM  --------------------------------------------------------------------------------  T(C): 36.4 (07-18-18 @ 06:31), Max: 36.8 (07-17-18 @ 12:45)  HR: 76 (07-18-18 @ 06:31) (70 - 76)  BP: 122/62 (07-18-18 @ 06:31) (111/56 - 138/64)  RR: 18 (07-18-18 @ 06:31) (16 - 18)  SpO2: 100% (07-18-18 @ 04:51) (100% - 100%)  Wt(kg): --    07-17-18 @ 07:01  -  07-18-18 @ 07:00  --------------------------------------------------------  IN: 200 mL / OUT: 0 mL / NET: 200 mL    Physical Exam:  	Gen: resting  	HEENT: No JVD  	Pulm: Fair air entry B/L  	CV: S1S2+  	Abd: Soft  	LE: No edema  	Skin: Warm  	Vascular access: RUE AVG site: skin intact     LABS/STUDIES  --------------------------------------------------------------------------------              8.9    6.98  >-----------<  229      [07-18-18 @ 06:40]              28.2     138  |  94  |  71  ----------------------------<  118      [07-18-18 @ 06:40]  4.0   |  26  |  6.35        Ca     9.3     [07-18-18 @ 06:40]      Mg     2.3     [07-18-18 @ 06:40]      Phos  4.8     [07-18-18 @ 06:40]
chief complaint : shortness of breath     Subjective : Pt lying in bed,  comfortable, not in distress, denies any chest pain or SOB    MEDICATIONS  (STANDING):  amiodarone    Tablet 200 milliGRAM(s) Oral daily  artificial  tears Solution 1 Drop(s) Both EYES four times a day  aspirin  chewable 81 milliGRAM(s) Oral daily  atorvastatin 40 milliGRAM(s) Oral at bedtime  busPIRone 5 milliGRAM(s) Oral two times a day  clopidogrel Tablet 75 milliGRAM(s) Oral daily  dextrose 5%. 1000 milliLiter(s) (50 mL/Hr) IV Continuous <Continuous>  dextrose 50% Injectable 12.5 Gram(s) IV Push once  dextrose 50% Injectable 25 Gram(s) IV Push once  dextrose 50% Injectable 25 Gram(s) IV Push once  heparin  Injectable 5000 Unit(s) SubCutaneous every 12 hours  insulin lispro (HumaLOG) corrective regimen sliding scale   SubCutaneous three times a day before meals  insulin lispro (HumaLOG) corrective regimen sliding scale   SubCutaneous at bedtime  metoprolol tartrate 25 milliGRAM(s) Oral two times a day  multivitamin 1 Tablet(s) Oral daily  pantoprazole    Tablet 40 milliGRAM(s) Oral before breakfast  tamsulosin 0.4 milliGRAM(s) Oral at bedtime    MEDICATIONS  (PRN):  acetaminophen   Tablet. 650 milliGRAM(s) Oral every 6 hours PRN Severe Pain (7 - 10)  ALBUTerol/ipratropium for Nebulization 3 milliLiter(s) Nebulizer every 6 hours PRN Shortness of Breath and/or Wheezing  dextrose 40% Gel 15 Gram(s) Oral once PRN Blood Glucose LESS THAN 70 milliGRAM(s)/deciliter  glucagon  Injectable 1 milliGRAM(s) IntraMuscular once PRN Glucose LESS THAN 70 milligrams/deciliter  melatonin 3 milliGRAM(s) Oral at bedtime PRN Insomnia    Vital Signs Last 24 Hrs  T(C): 37 (16 Jul 2018 16:49), Max: 37.2 (16 Jul 2018 12:34)  T(F): 98.6 (16 Jul 2018 16:49), Max: 99 (16 Jul 2018 12:34)  HR: 74 (16 Jul 2018 16:49) (69 - 81)  BP: 120/57 (16 Jul 2018 16:49) (120/57 - 156/72)  BP(mean): --  RR: 18 (16 Jul 2018 16:49) (17 - 18)  SpO2: 100% (16 Jul 2018 16:49) (95% - 100%)    Constitutional: No fever, fatigue  Skin: No rash.  Eyes: No recent vision problems or eye pain.  ENT: No congestion, ear pain, or sore throat.  Cardiovascular: No chest pain or palpation.  Respiratory: No cough, shortness of breath, congestion, or wheezing.  Gastrointestinal: No abdominal pain, nausea, vomiting, or diarrhea.  Genitourinary: No dysuria.  Musculoskeletal: No joint swelling.  Neurologic: No headache.    Appearance: Normal	  HEENT:   Normal oral mucosa, PERRL, EOMI	  Cardiovascular: Normal S1 S2, No JVD, No murmurs, No edema  Respiratory: dec breath sounds at bases 	  Gastrointestinal:  Soft, Non-tender, + BS	  Extremities: Normal range of motion, No clubbing, cyanosis or edema    LABS:  07-16    139  |  96<L>  |  77<H>  ----------------------------<  130<H>  4.0   |  23  |  6.65<H>    Ca    9.3      16 Jul 2018 07:00  Mg     2.3     07-16      Creatinine Trend: 6.65 <--, 5.80 <--, 4.30 <--, 4.83 <--, 3.31 <--, 5.09 <--, 4.64 <--                        8.4    7.75  )-----------( 238      ( 16 Jul 2018 07:00 )             26.9     Urine Studies:
chief complaint : shortness of breath     Subjective : Pt lying in bed comfortable, not in distress, denies any chest pain or SOB  	  MEDICATIONS  (STANDING):  amiodarone    Tablet 200 milliGRAM(s) Oral daily  artificial  tears Solution 1 Drop(s) Both EYES four times a day  aspirin  chewable 81 milliGRAM(s) Oral daily  atorvastatin 40 milliGRAM(s) Oral at bedtime  busPIRone 5 milliGRAM(s) Oral two times a day  clopidogrel Tablet 75 milliGRAM(s) Oral daily  dextrose 5%. 1000 milliLiter(s) (50 mL/Hr) IV Continuous <Continuous>  dextrose 50% Injectable 12.5 Gram(s) IV Push once  dextrose 50% Injectable 25 Gram(s) IV Push once  dextrose 50% Injectable 25 Gram(s) IV Push once  heparin  Injectable 5000 Unit(s) SubCutaneous every 12 hours  insulin lispro (HumaLOG) corrective regimen sliding scale   SubCutaneous three times a day before meals  insulin lispro (HumaLOG) corrective regimen sliding scale   SubCutaneous at bedtime  metoprolol tartrate 25 milliGRAM(s) Oral two times a day  multivitamin 1 Tablet(s) Oral daily  pantoprazole    Tablet 40 milliGRAM(s) Oral before breakfast  tamsulosin 0.4 milliGRAM(s) Oral at bedtime    MEDICATIONS  (PRN):  ALBUTerol/ipratropium for Nebulization 3 milliLiter(s) Nebulizer every 6 hours PRN Shortness of Breath and/or Wheezing  dextrose 40% Gel 15 Gram(s) Oral once PRN Blood Glucose LESS THAN 70 milliGRAM(s)/deciliter  glucagon  Injectable 1 milliGRAM(s) IntraMuscular once PRN Glucose LESS THAN 70 milligrams/deciliter    Vital Signs Last 24 Hrs  T(C): 36.7 (13 Jul 2018 22:07), Max: 36.8 (13 Jul 2018 09:45)  T(F): 98 (13 Jul 2018 22:07), Max: 98.2 (13 Jul 2018 09:45)  HR: 73 (13 Jul 2018 22:07) (73 - 89)  BP: 106/55 (13 Jul 2018 22:07) (106/55 - 133/68)  BP(mean): --  RR: 18 (13 Jul 2018 22:07) (16 - 20)  SpO2: 99% (13 Jul 2018 22:07) (96% - 100%)    Constitutional: No fever, fatigue  Skin: No rash.  Eyes: No recent vision problems or eye pain.  ENT: No congestion, ear pain, or sore throat.  Cardiovascular: No chest pain or palpation.  Respiratory: No cough, shortness of breath, congestion, or wheezing.  Gastrointestinal: No abdominal pain, nausea, vomiting, or diarrhea.  Genitourinary: No dysuria.  Musculoskeletal: No joint swelling.  Neurologic: No headache.    Appearance: Normal	  HEENT:   Normal oral mucosa, PERRL, EOMI	  Cardiovascular: Normal S1 S2, No JVD, No murmurs, No edema  Respiratory: dec breath sounds at bases 	  Gastrointestinal:  Soft, Non-tender, + BS	  Extremities: Normal range of motion, No clubbing, cyanosis or edema    LABS:  07-13    140  |  96<L>  |  55<H>  ----------------------------<  132<H>  3.6   |  29  |  4.83<H>    Ca    9.6      13 Jul 2018 06:45  Mg     2.2     07-12      Creatinine Trend: 4.83 <--, 3.31 <--, 5.09 <--, 4.64 <--                        8.0    7.47  )-----------( 234      ( 13 Jul 2018 06:45 )             26.4     Urine Studies:      CARDIAC MARKERS ( 12 Jul 2018 00:40 )  x     / x     / 79 u/L / 9.00 ng/mL / x
chief complaint : shortness of breath     Subjective : Pt lying in bed comfortable, not in distress, denies any chest pain or SOB  	  MEDICATIONS  (STANDING):  amiodarone    Tablet 200 milliGRAM(s) Oral daily  artificial  tears Solution 1 Drop(s) Both EYES four times a day  aspirin  chewable 81 milliGRAM(s) Oral daily  atorvastatin 40 milliGRAM(s) Oral at bedtime  busPIRone 5 milliGRAM(s) Oral two times a day  clopidogrel Tablet 75 milliGRAM(s) Oral daily  dextrose 5%. 1000 milliLiter(s) (50 mL/Hr) IV Continuous <Continuous>  dextrose 50% Injectable 12.5 Gram(s) IV Push once  dextrose 50% Injectable 25 Gram(s) IV Push once  dextrose 50% Injectable 25 Gram(s) IV Push once  heparin  Injectable 5000 Unit(s) SubCutaneous every 12 hours  insulin lispro (HumaLOG) corrective regimen sliding scale   SubCutaneous three times a day before meals  insulin lispro (HumaLOG) corrective regimen sliding scale   SubCutaneous at bedtime  metoprolol tartrate 25 milliGRAM(s) Oral two times a day  multivitamin 1 Tablet(s) Oral daily  pantoprazole    Tablet 40 milliGRAM(s) Oral before breakfast  tamsulosin 0.4 milliGRAM(s) Oral at bedtime    MEDICATIONS  (PRN):  ALBUTerol/ipratropium for Nebulization 3 milliLiter(s) Nebulizer every 6 hours PRN Shortness of Breath and/or Wheezing  dextrose 40% Gel 15 Gram(s) Oral once PRN Blood Glucose LESS THAN 70 milliGRAM(s)/deciliter  glucagon  Injectable 1 milliGRAM(s) IntraMuscular once PRN Glucose LESS THAN 70 milligrams/deciliter  melatonin 3 milliGRAM(s) Oral at bedtime PRN Insomnia    Vital Signs Last 24 Hrs  T(C): 36.8 (14 Jul 2018 20:25), Max: 36.8 (14 Jul 2018 13:36)  T(F): 98.3 (14 Jul 2018 20:25), Max: 98.3 (14 Jul 2018 20:25)  HR: 71 (14 Jul 2018 20:25) (70 - 88)  BP: 127/67 (14 Jul 2018 20:25) (121/62 - 136/61)  BP(mean): --  RR: 17 (14 Jul 2018 20:25) (17 - 18)  SpO2: 100% (14 Jul 2018 20:25) (96% - 100%)    Constitutional: No fever, fatigue  Skin: No rash.  Eyes: No recent vision problems or eye pain.  ENT: No congestion, ear pain, or sore throat.  Cardiovascular: No chest pain or palpation.  Respiratory: No cough, shortness of breath, congestion, or wheezing.  Gastrointestinal: No abdominal pain, nausea, vomiting, or diarrhea.  Genitourinary: No dysuria.  Musculoskeletal: No joint swelling.  Neurologic: No headache.    Appearance: Normal	  HEENT:   Normal oral mucosa, PERRL, EOMI	  Cardiovascular: Normal S1 S2, No JVD, No murmurs, No edema  Respiratory: dec breath sounds at bases 	  Gastrointestinal:  Soft, Non-tender, + BS	  Extremities: Normal range of motion, No clubbing, cyanosis or edema    LABS:  07-14    139  |  96<L>  |  45<H>  ----------------------------<  128<H>  3.4<L>   |  28  |  4.30<H>    Ca    9.6      14 Jul 2018 06:55  Mg     2.1     07-14      Creatinine Trend: 4.30 <--, 4.83 <--, 3.31 <--, 5.09 <--, 4.64 <--                        8.6    7.80  )-----------( 268      ( 14 Jul 2018 06:35 )             28.3     Urine Studies:

## 2018-07-20 NOTE — PROGRESS NOTE ADULT - PROBLEM/PLAN-7
Subjective   Rachel Pedraza is a 76 y.o. female is here today as a self referral for dyspareunia and bleeding with use of vaginal applicator of Rephresh.    History of Present Illness-here today for recent problems with intercourse and vaginal discomfort.    The following portions of the patient's history were reviewed and updated as appropriate: allergies, current medications, past family history, past medical history, past social history, past surgical history and problem list.    Review of Systems   Constitutional: Negative.    Gastrointestinal: Negative.    Genitourinary: Positive for dyspareunia.   Neurological: Negative.    Psychiatric/Behavioral: Negative.        Objective   Physical Exam   Constitutional: She is oriented to person, place, and time. She appears well-developed and well-nourished.   HENT:   Head: Normocephalic and atraumatic.   Eyes: Pupils are equal, round, and reactive to light.   Pulmonary/Chest: Effort normal.   Genitourinary: Vagina normal.   Genitourinary Comments: No lesions noted and no problems noted except for atrophic vaginitis.   Neurological: She is alert and oriented to person, place, and time. She has normal reflexes.   Psychiatric: She has a normal mood and affect. Her behavior is normal. Judgment and thought content normal.   Nursing note and vitals reviewed.        Assessment/Plan   Problems Addressed this Visit     None      Visit Diagnoses     Vaginitis, atrophic    -  Primary    Dyspareunia, female          Recommended the use of additional lubricants for intercourse.  Sample and prescription given for Premarin vaginal cream.             
DISPLAY PLAN FREE TEXT

## 2018-07-24 NOTE — PROGRESS NOTE ADULT - SUBJECTIVE AND OBJECTIVE BOX
JIMI BUCHANAN:[id number],   72yMale followed for:  No Known Allergies    PAST MEDICAL & SURGICAL HISTORY:  CVA (cerebral vascular accident)  COPD (chronic obstructive pulmonary disease)  BPH (benign prostatic hyperplasia)  Bipolar affective disorder  CKD (chronic kidney disease)  Pancreatitis  Hypertension  Dyslipidemia  Diabetes mellitus  Coronary artery disease  HLD (hyperlipidemia)  Anemia  Acute renal failure  CKD (chronic kidney disease)  COPD (chronic obstructive pulmonary disease)  Fainting  Cardiac arrhythmia  Dizziness  Hydronephrosis  Cholecystitis  Bipolar 1 disorder  DM (diabetes mellitus)  HTN (hypertension)  Lumbar radiculopathy  Left heart failure  Reflux esophagitis  Coronary atherosclerosis  History of cardiac catheterization  No significant past surgical history    FAMILY HISTORY:  No pertinent family history in first degree relatives    MEDICATIONS  (STANDING):  ALBUTerol/ipratropium for Nebulization 3 milliLiter(s) Nebulizer every 6 hours  amiodarone    Tablet 200 milliGRAM(s) Oral daily  ampicillin  IVPB 2 Gram(s) IV Intermittent every 12 hours  artificial  tears Solution 1 Drop(s) Both EYES four times a day  aspirin  chewable 81 milliGRAM(s) Oral daily  atorvastatin 40 milliGRAM(s) Oral at bedtime  busPIRone 5 milliGRAM(s) Oral two times a day  clopidogrel Tablet 75 milliGRAM(s) Oral daily  dextrose 50% Injectable 12.5 Gram(s) IV Push once  dextrose 50% Injectable 25 Gram(s) IV Push once  dextrose 50% Injectable 25 Gram(s) IV Push once  epoetin georgie Injectable 4000 Unit(s) IV Push <User Schedule>  heparin  Infusion.  Unit(s)/Hr (11 mL/Hr) IV Continuous <Continuous>  insulin lispro (HumaLOG) corrective regimen sliding scale   SubCutaneous three times a day before meals  insulin lispro (HumaLOG) corrective regimen sliding scale   SubCutaneous at bedtime  metoprolol tartrate 25 milliGRAM(s) Oral two times a day  multivitamin 1 Tablet(s) Oral daily  pantoprazole    Tablet 40 milliGRAM(s) Oral before breakfast  tamsulosin 0.4 milliGRAM(s) Oral at bedtime  warfarin 3 milliGRAM(s) Oral once    MEDICATIONS  (PRN):  acetaminophen  Suppository 650 milliGRAM(s) Rectal every 6 hours PRN For Temp greater than 38 C (100.4 F)  dextrose 40% Gel 15 Gram(s) Oral once PRN Blood Glucose LESS THAN 70 milliGRAM(s)/deciliter  glucagon  Injectable 1 milliGRAM(s) IntraMuscular once PRN Glucose LESS THAN 70 milligrams/deciliter  heparin  Injectable 5000 Unit(s) IV Push every 6 hours PRN For aPTT less than 40  heparin  Injectable 2500 Unit(s) IV Push every 6 hours PRN For aPTT between 40 - 57      Vital Signs Last 24 Hrs  T(C): 36.1 (29 Jun 2018 07:30), Max: 36.9 (28 Jun 2018 12:53)  T(F): 96.9 (29 Jun 2018 07:30), Max: 98.5 (28 Jun 2018 12:53)  HR: 86 (29 Jun 2018 07:30) (61 - 94)  BP: 132/68 (29 Jun 2018 07:30) (107/57 - 132/68)  BP(mean): --  RR: 20 (29 Jun 2018 07:30) (16 - 26)  SpO2: 100% (29 Jun 2018 07:30) (89% - 100%)  nc/at  s1s2  cta  soft, nt, nd no guarding or rebound  no c/c/e    CBC Full  -  ( 29 Jun 2018 03:38 )  WBC Count : 8.71 K/uL  Hemoglobin : 8.2 g/dL  Hematocrit : 26.9 %  Platelet Count - Automated : 248 K/uL  Mean Cell Volume : 92.1 fL  Mean Cell Hemoglobin : 28.1 pg  Mean Cell Hemoglobin Concentration : 30.5 %  Auto Neutrophil # : 6.14 K/uL  Auto Lymphocyte # : 1.75 K/uL  Auto Monocyte # : 0.50 K/uL  Auto Eosinophil # : 0.22 K/uL  Auto Basophil # : 0.03 K/uL  Auto Neutrophil % : 70.6 %  Auto Lymphocyte % : 20.1 %  Auto Monocyte % : 5.7 %  Auto Eosinophil % : 2.5 %  Auto Basophil % : 0.3 %    06-29    135  |  90<L>  |  27<H>  ----------------------------<  177<H>  4.1   |  21<L>  |  4.85<H>    Ca    9.4      29 Jun 2018 03:38  Mg     2.2     06-29      PT/INR - ( 29 Jun 2018 03:38 )   PT: 16.0 SEC;   INR: 1.38          PTT - ( 29 Jun 2018 03:38 )  PTT:> 200.0 SEC No please have my  present whenever possible

## 2018-09-03 ENCOUNTER — INPATIENT (INPATIENT)
Facility: HOSPITAL | Age: 73
LOS: 7 days | Discharge: NURSING, REHAB OR LONG-TERM CARE | End: 2018-09-11
Attending: HOSPITALIST | Admitting: HOSPITALIST
Payer: MEDICARE

## 2018-09-03 VITALS
DIASTOLIC BLOOD PRESSURE: 74 MMHG | OXYGEN SATURATION: 100 % | RESPIRATION RATE: 22 BRPM | HEART RATE: 85 BPM | SYSTOLIC BLOOD PRESSURE: 128 MMHG | TEMPERATURE: 98 F

## 2018-09-03 DIAGNOSIS — I63.9 CEREBRAL INFARCTION, UNSPECIFIED: ICD-10-CM

## 2018-09-03 DIAGNOSIS — F31.9 BIPOLAR DISORDER, UNSPECIFIED: ICD-10-CM

## 2018-09-03 DIAGNOSIS — E11.9 TYPE 2 DIABETES MELLITUS WITHOUT COMPLICATIONS: ICD-10-CM

## 2018-09-03 DIAGNOSIS — N40.0 BENIGN PROSTATIC HYPERPLASIA WITHOUT LOWER URINARY TRACT SYMPTOMS: ICD-10-CM

## 2018-09-03 DIAGNOSIS — I50.23 ACUTE ON CHRONIC SYSTOLIC (CONGESTIVE) HEART FAILURE: ICD-10-CM

## 2018-09-03 DIAGNOSIS — N18.6 END STAGE RENAL DISEASE: ICD-10-CM

## 2018-09-03 DIAGNOSIS — Z29.9 ENCOUNTER FOR PROPHYLACTIC MEASURES, UNSPECIFIED: ICD-10-CM

## 2018-09-03 DIAGNOSIS — E78.5 HYPERLIPIDEMIA, UNSPECIFIED: ICD-10-CM

## 2018-09-03 DIAGNOSIS — Z98.890 OTHER SPECIFIED POSTPROCEDURAL STATES: Chronic | ICD-10-CM

## 2018-09-03 DIAGNOSIS — N18.9 CHRONIC KIDNEY DISEASE, UNSPECIFIED: ICD-10-CM

## 2018-09-03 DIAGNOSIS — D64.9 ANEMIA, UNSPECIFIED: ICD-10-CM

## 2018-09-03 DIAGNOSIS — I25.10 ATHEROSCLEROTIC HEART DISEASE OF NATIVE CORONARY ARTERY WITHOUT ANGINA PECTORIS: ICD-10-CM

## 2018-09-03 DIAGNOSIS — R06.02 SHORTNESS OF BREATH: ICD-10-CM

## 2018-09-03 PROBLEM — K85.90 ACUTE PANCREATITIS WITHOUT NECROSIS OR INFECTION, UNSPECIFIED: Chronic | Status: ACTIVE | Noted: 2017-09-12

## 2018-09-03 PROBLEM — N17.9 ACUTE KIDNEY FAILURE, UNSPECIFIED: Chronic | Status: ACTIVE | Noted: 2017-09-07

## 2018-09-03 PROBLEM — K21.0 GASTRO-ESOPHAGEAL REFLUX DISEASE WITH ESOPHAGITIS: Chronic | Status: ACTIVE | Noted: 2017-09-07

## 2018-09-03 PROBLEM — R55 SYNCOPE AND COLLAPSE: Chronic | Status: ACTIVE | Noted: 2017-09-07

## 2018-09-03 PROBLEM — J44.9 CHRONIC OBSTRUCTIVE PULMONARY DISEASE, UNSPECIFIED: Chronic | Status: ACTIVE | Noted: 2017-09-12

## 2018-09-03 PROBLEM — R42 DIZZINESS AND GIDDINESS: Chronic | Status: ACTIVE | Noted: 2017-09-07

## 2018-09-03 PROBLEM — I10 ESSENTIAL (PRIMARY) HYPERTENSION: Chronic | Status: ACTIVE | Noted: 2017-09-12

## 2018-09-03 PROBLEM — J44.9 CHRONIC OBSTRUCTIVE PULMONARY DISEASE, UNSPECIFIED: Chronic | Status: ACTIVE | Noted: 2017-09-07

## 2018-09-03 PROBLEM — I50.1 LEFT VENTRICULAR FAILURE, UNSPECIFIED: Chronic | Status: ACTIVE | Noted: 2017-09-07

## 2018-09-03 PROBLEM — M54.16 RADICULOPATHY, LUMBAR REGION: Chronic | Status: ACTIVE | Noted: 2017-09-07

## 2018-09-03 PROBLEM — I10 ESSENTIAL (PRIMARY) HYPERTENSION: Chronic | Status: ACTIVE | Noted: 2017-09-07

## 2018-09-03 PROBLEM — I49.9 CARDIAC ARRHYTHMIA, UNSPECIFIED: Chronic | Status: ACTIVE | Noted: 2017-09-07

## 2018-09-03 PROBLEM — K81.9 CHOLECYSTITIS, UNSPECIFIED: Chronic | Status: ACTIVE | Noted: 2017-09-07

## 2018-09-03 PROBLEM — N13.30 UNSPECIFIED HYDRONEPHROSIS: Chronic | Status: ACTIVE | Noted: 2017-09-07

## 2018-09-03 LAB
ALBUMIN SERPL ELPH-MCNC: 3.5 G/DL — SIGNIFICANT CHANGE UP (ref 3.3–5)
ALP SERPL-CCNC: 90 U/L — SIGNIFICANT CHANGE UP (ref 40–120)
ALT FLD-CCNC: 16 U/L — SIGNIFICANT CHANGE UP (ref 4–41)
APTT BLD: 33.7 SEC — SIGNIFICANT CHANGE UP (ref 27.5–37.4)
AST SERPL-CCNC: 14 U/L — SIGNIFICANT CHANGE UP (ref 4–40)
BASE EXCESS BLDA CALC-SCNC: 5.1 MMOL/L — SIGNIFICANT CHANGE UP
BASE EXCESS BLDV CALC-SCNC: 0 MMOL/L — SIGNIFICANT CHANGE UP
BASE EXCESS BLDV CALC-SCNC: 0 MMOL/L — SIGNIFICANT CHANGE UP
BASOPHILS # BLD AUTO: 0.02 K/UL — SIGNIFICANT CHANGE UP (ref 0–0.2)
BASOPHILS # BLD AUTO: 0.03 K/UL — SIGNIFICANT CHANGE UP (ref 0–0.2)
BASOPHILS NFR BLD AUTO: 0.2 % — SIGNIFICANT CHANGE UP (ref 0–2)
BASOPHILS NFR BLD AUTO: 0.5 % — SIGNIFICANT CHANGE UP (ref 0–2)
BILIRUB SERPL-MCNC: 0.3 MG/DL — SIGNIFICANT CHANGE UP (ref 0.2–1.2)
BLOOD GAS VENOUS - CREATININE: 6.37 MG/DL — HIGH (ref 0.5–1.3)
BUN SERPL-MCNC: 74 MG/DL — HIGH (ref 7–23)
CALCIUM SERPL-MCNC: 9.3 MG/DL — SIGNIFICANT CHANGE UP (ref 8.4–10.5)
CHLORIDE BLDV-SCNC: 103 MMOL/L — SIGNIFICANT CHANGE UP (ref 96–108)
CHLORIDE SERPL-SCNC: 99 MMOL/L — SIGNIFICANT CHANGE UP (ref 98–107)
CK MB BLD-MCNC: 3.69 NG/ML — SIGNIFICANT CHANGE UP (ref 1–6.6)
CK MB BLD-MCNC: SIGNIFICANT CHANGE UP (ref 0–2.5)
CK SERPL-CCNC: 34 U/L — SIGNIFICANT CHANGE UP (ref 30–200)
CO2 SERPL-SCNC: 23 MMOL/L — SIGNIFICANT CHANGE UP (ref 22–31)
CREAT SERPL-MCNC: 5.91 MG/DL — HIGH (ref 0.5–1.3)
EOSINOPHIL # BLD AUTO: 0 K/UL — SIGNIFICANT CHANGE UP (ref 0–0.5)
EOSINOPHIL # BLD AUTO: 0.01 K/UL — SIGNIFICANT CHANGE UP (ref 0–0.5)
EOSINOPHIL NFR BLD AUTO: 0 % — SIGNIFICANT CHANGE UP (ref 0–6)
EOSINOPHIL NFR BLD AUTO: 0.1 % — SIGNIFICANT CHANGE UP (ref 0–6)
GAS PNL BLDV: 138 MMOL/L — SIGNIFICANT CHANGE UP (ref 136–146)
GLUCOSE BLDA-MCNC: 177 MG/DL — HIGH (ref 70–99)
GLUCOSE BLDV-MCNC: 217 — HIGH (ref 70–99)
GLUCOSE SERPL-MCNC: 228 MG/DL — HIGH (ref 70–99)
HBV SURFACE AG SER-ACNC: NEGATIVE — SIGNIFICANT CHANGE UP
HCO3 BLDA-SCNC: 29 MMOL/L — HIGH (ref 22–26)
HCO3 BLDV-SCNC: 23 MMOL/L — SIGNIFICANT CHANGE UP (ref 20–27)
HCO3 BLDV-SCNC: 24 MMOL/L — SIGNIFICANT CHANGE UP (ref 20–27)
HCT VFR BLD CALC: 26.9 % — LOW (ref 39–50)
HCT VFR BLD CALC: 32 % — LOW (ref 39–50)
HCT VFR BLDA CALC: 27.9 % — LOW (ref 39–51)
HCT VFR BLDV CALC: 29.4 % — LOW (ref 39–51)
HGB BLD-MCNC: 8.3 G/DL — LOW (ref 13–17)
HGB BLD-MCNC: 9.6 G/DL — LOW (ref 13–17)
HGB BLDA-MCNC: 9 G/DL — LOW (ref 13–17)
HGB BLDV-MCNC: 9.5 G/DL — LOW (ref 13–17)
IMM GRANULOCYTES # BLD AUTO: 0.02 # — SIGNIFICANT CHANGE UP
IMM GRANULOCYTES # BLD AUTO: 0.03 # — SIGNIFICANT CHANGE UP
IMM GRANULOCYTES NFR BLD AUTO: 0.3 % — SIGNIFICANT CHANGE UP (ref 0–1.5)
IMM GRANULOCYTES NFR BLD AUTO: 0.3 % — SIGNIFICANT CHANGE UP (ref 0–1.5)
INR BLD: 1.06 — SIGNIFICANT CHANGE UP (ref 0.88–1.17)
LACTATE BLDV-MCNC: 2.9 MMOL/L — HIGH (ref 0.5–2)
LACTATE BLDV-MCNC: 4.1 MMOL/L — CRITICAL HIGH (ref 0.5–2)
LYMPHOCYTES # BLD AUTO: 0.55 K/UL — LOW (ref 1–3.3)
LYMPHOCYTES # BLD AUTO: 0.88 K/UL — LOW (ref 1–3.3)
LYMPHOCYTES # BLD AUTO: 14.3 % — SIGNIFICANT CHANGE UP (ref 13–44)
LYMPHOCYTES # BLD AUTO: 6.1 % — LOW (ref 13–44)
MCHC RBC-ENTMCNC: 29.4 PG — SIGNIFICANT CHANGE UP (ref 27–34)
MCHC RBC-ENTMCNC: 29.6 PG — SIGNIFICANT CHANGE UP (ref 27–34)
MCHC RBC-ENTMCNC: 30 % — LOW (ref 32–36)
MCHC RBC-ENTMCNC: 30.9 % — LOW (ref 32–36)
MCV RBC AUTO: 95.4 FL — SIGNIFICANT CHANGE UP (ref 80–100)
MCV RBC AUTO: 98.8 FL — SIGNIFICANT CHANGE UP (ref 80–100)
MONOCYTES # BLD AUTO: 0.36 K/UL — SIGNIFICANT CHANGE UP (ref 0–0.9)
MONOCYTES # BLD AUTO: 0.42 K/UL — SIGNIFICANT CHANGE UP (ref 0–0.9)
MONOCYTES NFR BLD AUTO: 4.7 % — SIGNIFICANT CHANGE UP (ref 2–14)
MONOCYTES NFR BLD AUTO: 5.8 % — SIGNIFICANT CHANGE UP (ref 2–14)
NEUTROPHILS # BLD AUTO: 4.87 K/UL — SIGNIFICANT CHANGE UP (ref 1.8–7.4)
NEUTROPHILS # BLD AUTO: 8 K/UL — HIGH (ref 1.8–7.4)
NEUTROPHILS NFR BLD AUTO: 79.1 % — HIGH (ref 43–77)
NEUTROPHILS NFR BLD AUTO: 88.6 % — HIGH (ref 43–77)
NRBC # FLD: 0 — SIGNIFICANT CHANGE UP
NRBC # FLD: 0 — SIGNIFICANT CHANGE UP
NT-PROBNP SERPL-SCNC: SIGNIFICANT CHANGE UP PG/ML
PCO2 BLDA: 36 MMHG — SIGNIFICANT CHANGE UP (ref 35–48)
PCO2 BLDV: 54 MMHG — HIGH (ref 41–51)
PCO2 BLDV: 61 MMHG — HIGH (ref 41–51)
PH BLDA: 7.51 PH — HIGH (ref 7.35–7.45)
PH BLDV: 7.26 PH — LOW (ref 7.32–7.43)
PH BLDV: 7.3 PH — LOW (ref 7.32–7.43)
PLATELET # BLD AUTO: 241 K/UL — SIGNIFICANT CHANGE UP (ref 150–400)
PLATELET # BLD AUTO: 248 K/UL — SIGNIFICANT CHANGE UP (ref 150–400)
PMV BLD: 9.9 FL — SIGNIFICANT CHANGE UP (ref 7–13)
PMV BLD: 9.9 FL — SIGNIFICANT CHANGE UP (ref 7–13)
PO2 BLDA: 104 MMHG — SIGNIFICANT CHANGE UP (ref 83–108)
PO2 BLDV: 42 MMHG — HIGH (ref 35–40)
PO2 BLDV: 51 MMHG — HIGH (ref 35–40)
POTASSIUM BLDA-SCNC: 3.2 MMOL/L — LOW (ref 3.4–4.5)
POTASSIUM BLDV-SCNC: 4.8 MMOL/L — HIGH (ref 3.4–4.5)
POTASSIUM SERPL-MCNC: 4.8 MMOL/L — SIGNIFICANT CHANGE UP (ref 3.5–5.3)
POTASSIUM SERPL-SCNC: 4.8 MMOL/L — SIGNIFICANT CHANGE UP (ref 3.5–5.3)
PROT SERPL-MCNC: 6.8 G/DL — SIGNIFICANT CHANGE UP (ref 6–8.3)
PROTHROM AB SERPL-ACNC: 12.2 SEC — SIGNIFICANT CHANGE UP (ref 9.8–13.1)
RBC # BLD: 2.82 M/UL — LOW (ref 4.2–5.8)
RBC # BLD: 3.24 M/UL — LOW (ref 4.2–5.8)
RBC # FLD: 14.6 % — HIGH (ref 10.3–14.5)
RBC # FLD: 14.6 % — HIGH (ref 10.3–14.5)
SAO2 % BLDA: 97.6 % — SIGNIFICANT CHANGE UP (ref 95–99)
SAO2 % BLDV: 62.3 % — SIGNIFICANT CHANGE UP (ref 60–85)
SAO2 % BLDV: 77 % — SIGNIFICANT CHANGE UP (ref 60–85)
SODIUM BLDA-SCNC: 138 MMOL/L — SIGNIFICANT CHANGE UP (ref 136–146)
SODIUM SERPL-SCNC: 143 MMOL/L — SIGNIFICANT CHANGE UP (ref 135–145)
TROPONIN T, HIGH SENSITIVITY: 503 NG/L — CRITICAL HIGH (ref ?–14)
TROPONIN T, HIGH SENSITIVITY: 536 NG/L — CRITICAL HIGH (ref ?–14)
WBC # BLD: 6.16 K/UL — SIGNIFICANT CHANGE UP (ref 3.8–10.5)
WBC # BLD: 9.03 K/UL — SIGNIFICANT CHANGE UP (ref 3.8–10.5)
WBC # FLD AUTO: 6.16 K/UL — SIGNIFICANT CHANGE UP (ref 3.8–10.5)
WBC # FLD AUTO: 9.03 K/UL — SIGNIFICANT CHANGE UP (ref 3.8–10.5)

## 2018-09-03 PROCEDURE — 71045 X-RAY EXAM CHEST 1 VIEW: CPT | Mod: 26

## 2018-09-03 PROCEDURE — 99232 SBSQ HOSP IP/OBS MODERATE 35: CPT

## 2018-09-03 PROCEDURE — 99222 1ST HOSP IP/OBS MODERATE 55: CPT | Mod: GC

## 2018-09-03 RX ORDER — AMIODARONE HYDROCHLORIDE 400 MG/1
200 TABLET ORAL DAILY
Qty: 0 | Refills: 0 | Status: DISCONTINUED | OUTPATIENT
Start: 2018-09-03 | End: 2018-09-03

## 2018-09-03 RX ORDER — HEPARIN SODIUM 5000 [USP'U]/ML
5000 INJECTION INTRAVENOUS; SUBCUTANEOUS EVERY 8 HOURS
Qty: 0 | Refills: 0 | Status: DISCONTINUED | OUTPATIENT
Start: 2018-09-03 | End: 2018-09-11

## 2018-09-03 RX ORDER — GLUCAGON INJECTION, SOLUTION 0.5 MG/.1ML
1 INJECTION, SOLUTION SUBCUTANEOUS ONCE
Qty: 0 | Refills: 0 | Status: DISCONTINUED | OUTPATIENT
Start: 2018-09-03 | End: 2018-09-11

## 2018-09-03 RX ORDER — SODIUM CHLORIDE 9 MG/ML
1000 INJECTION, SOLUTION INTRAVENOUS
Qty: 0 | Refills: 0 | Status: DISCONTINUED | OUTPATIENT
Start: 2018-09-03 | End: 2018-09-11

## 2018-09-03 RX ORDER — LANOLIN ALCOHOL/MO/W.PET/CERES
3 CREAM (GRAM) TOPICAL AT BEDTIME
Qty: 0 | Refills: 0 | Status: DISCONTINUED | OUTPATIENT
Start: 2018-09-03 | End: 2018-09-11

## 2018-09-03 RX ORDER — DEXTROSE 50 % IN WATER 50 %
12.5 SYRINGE (ML) INTRAVENOUS ONCE
Qty: 0 | Refills: 0 | Status: DISCONTINUED | OUTPATIENT
Start: 2018-09-03 | End: 2018-09-11

## 2018-09-03 RX ORDER — DEXTROSE 50 % IN WATER 50 %
15 SYRINGE (ML) INTRAVENOUS ONCE
Qty: 0 | Refills: 0 | Status: DISCONTINUED | OUTPATIENT
Start: 2018-09-03 | End: 2018-09-11

## 2018-09-03 RX ORDER — INSULIN LISPRO 100/ML
VIAL (ML) SUBCUTANEOUS
Qty: 0 | Refills: 0 | Status: DISCONTINUED | OUTPATIENT
Start: 2018-09-03 | End: 2018-09-11

## 2018-09-03 RX ORDER — TIOTROPIUM BROMIDE 18 UG/1
1 CAPSULE ORAL; RESPIRATORY (INHALATION) DAILY
Qty: 0 | Refills: 0 | Status: DISCONTINUED | OUTPATIENT
Start: 2018-09-03 | End: 2018-09-11

## 2018-09-03 RX ORDER — METOPROLOL TARTRATE 50 MG
25 TABLET ORAL
Qty: 0 | Refills: 0 | Status: DISCONTINUED | OUTPATIENT
Start: 2018-09-03 | End: 2018-09-03

## 2018-09-03 RX ORDER — ALBUTEROL 90 UG/1
2 AEROSOL, METERED ORAL EVERY 6 HOURS
Qty: 0 | Refills: 0 | Status: DISCONTINUED | OUTPATIENT
Start: 2018-09-03 | End: 2018-09-11

## 2018-09-03 RX ORDER — ACETAMINOPHEN 500 MG
1000 TABLET ORAL ONCE
Qty: 0 | Refills: 0 | Status: DISCONTINUED | OUTPATIENT
Start: 2018-09-03 | End: 2018-09-11

## 2018-09-03 RX ORDER — DEXTROSE 50 % IN WATER 50 %
25 SYRINGE (ML) INTRAVENOUS ONCE
Qty: 0 | Refills: 0 | Status: DISCONTINUED | OUTPATIENT
Start: 2018-09-03 | End: 2018-09-11

## 2018-09-03 RX ORDER — FUROSEMIDE 40 MG
40 TABLET ORAL ONCE
Qty: 0 | Refills: 0 | Status: COMPLETED | OUTPATIENT
Start: 2018-09-03 | End: 2018-09-03

## 2018-09-03 RX ORDER — CLOPIDOGREL BISULFATE 75 MG/1
75 TABLET, FILM COATED ORAL DAILY
Qty: 0 | Refills: 0 | Status: DISCONTINUED | OUTPATIENT
Start: 2018-09-03 | End: 2018-09-11

## 2018-09-03 RX ORDER — TAMSULOSIN HYDROCHLORIDE 0.4 MG/1
0.4 CAPSULE ORAL AT BEDTIME
Qty: 0 | Refills: 0 | Status: DISCONTINUED | OUTPATIENT
Start: 2018-09-03 | End: 2018-09-11

## 2018-09-03 RX ORDER — INSULIN LISPRO 100/ML
VIAL (ML) SUBCUTANEOUS AT BEDTIME
Qty: 0 | Refills: 0 | Status: DISCONTINUED | OUTPATIENT
Start: 2018-09-03 | End: 2018-09-11

## 2018-09-03 RX ORDER — METOPROLOL TARTRATE 50 MG
25 TABLET ORAL
Qty: 0 | Refills: 0 | Status: DISCONTINUED | OUTPATIENT
Start: 2018-09-03 | End: 2018-09-11

## 2018-09-03 RX ORDER — ATORVASTATIN CALCIUM 80 MG/1
40 TABLET, FILM COATED ORAL AT BEDTIME
Qty: 0 | Refills: 0 | Status: DISCONTINUED | OUTPATIENT
Start: 2018-09-03 | End: 2018-09-11

## 2018-09-03 RX ORDER — AMIODARONE HYDROCHLORIDE 400 MG/1
200 TABLET ORAL AT BEDTIME
Qty: 0 | Refills: 0 | Status: DISCONTINUED | OUTPATIENT
Start: 2018-09-03 | End: 2018-09-11

## 2018-09-03 RX ORDER — ASPIRIN/CALCIUM CARB/MAGNESIUM 324 MG
81 TABLET ORAL DAILY
Qty: 0 | Refills: 0 | Status: DISCONTINUED | OUTPATIENT
Start: 2018-09-03 | End: 2018-09-11

## 2018-09-03 RX ADMIN — Medication 2: at 17:21

## 2018-09-03 RX ADMIN — AMIODARONE HYDROCHLORIDE 200 MILLIGRAM(S): 400 TABLET ORAL at 22:21

## 2018-09-03 RX ADMIN — Medication 25 MILLIGRAM(S): at 17:21

## 2018-09-03 RX ADMIN — ALBUTEROL 2 PUFF(S): 90 AEROSOL, METERED ORAL at 17:21

## 2018-09-03 RX ADMIN — Medication 3 MILLIGRAM(S): at 21:46

## 2018-09-03 RX ADMIN — CLOPIDOGREL BISULFATE 75 MILLIGRAM(S): 75 TABLET, FILM COATED ORAL at 17:21

## 2018-09-03 RX ADMIN — Medication 40 MILLIGRAM(S): at 08:14

## 2018-09-03 RX ADMIN — HEPARIN SODIUM 5000 UNIT(S): 5000 INJECTION INTRAVENOUS; SUBCUTANEOUS at 17:21

## 2018-09-03 RX ADMIN — Medication 5 MILLIGRAM(S): at 18:33

## 2018-09-03 RX ADMIN — TAMSULOSIN HYDROCHLORIDE 0.4 MILLIGRAM(S): 0.4 CAPSULE ORAL at 21:46

## 2018-09-03 RX ADMIN — Medication 81 MILLIGRAM(S): at 17:21

## 2018-09-03 RX ADMIN — ATORVASTATIN CALCIUM 40 MILLIGRAM(S): 80 TABLET, FILM COATED ORAL at 21:46

## 2018-09-03 RX ADMIN — ALBUTEROL 2 PUFF(S): 90 AEROSOL, METERED ORAL at 21:47

## 2018-09-03 RX ADMIN — HEPARIN SODIUM 5000 UNIT(S): 5000 INJECTION INTRAVENOUS; SUBCUTANEOUS at 21:46

## 2018-09-03 NOTE — H&P ADULT - PROBLEM SELECTOR PLAN 6
- likely 2/2 CKD  - trend CBC  - maintain active T&S; hgb > 8 given cardiac hx  - if transfusion necessary, will give half unit over three hours followed by diuresis with another half unit  - f/u renal recs

## 2018-09-03 NOTE — H&P ADULT - PROBLEM SELECTOR PLAN 3
- c/w ASA + Plavix + statin - s/p BABAR x5 in Sept 2017  - c/w ASA + Plavix + statin - s/p BABAR x5 in Sept 2017  -Troponemia to 500s on this admission 2/2 ESRD and volume overloaded status  - c/w ASA + Plavix + statin

## 2018-09-03 NOTE — ED PROVIDER NOTE - MEDICAL DECISION MAKING DETAILS
73M p/w SOB, h/o ESRD on dialysis. Concern for COPD vs. CHF exacerbation vs. ESRD vs. ACS vs. fluid overload. Will get labs. BiPAP, trop. Admit.

## 2018-09-03 NOTE — CONSULT NOTE ADULT - SUBJECTIVE AND OBJECTIVE BOX
[facility] Division of Kidney Diseases & Hypertension  INITIAL CONSULT NOTE  [phone]--------------------------------------------------------------------------------    HPI: 71 y/o M with CAD s/p 12-14 stents placed in 1990's, and BABAR x 5 placed in Sept 2017, CHF with EF 20%, DM, HTN, HLD, AF on coumadin, CVA , ESRD on HD (MWF) COPD, bipolar disorder, dementia was sent from rehab center due to SOB. Nephrology was consulted for management of ESRD. Last HD was done on 8/31 at outpatient HD center. Patient currently on BIPAP; unable to obtain further history.         PAST HISTORY  --------------------------------------------------------------------------------  PAST MEDICAL & SURGICAL HISTORY:  CVA (cerebral vascular accident)  COPD (chronic obstructive pulmonary disease)  BPH (benign prostatic hyperplasia)  Bipolar affective disorder  CKD (chronic kidney disease)  Pancreatitis  Hypertension  Dyslipidemia  Diabetes mellitus  Coronary artery disease  HLD (hyperlipidemia)  Anemia  Acute renal failure  CKD (chronic kidney disease)  COPD (chronic obstructive pulmonary disease)  Fainting  Cardiac arrhythmia  Dizziness  Hydronephrosis  Cholecystitis  Bipolar 1 disorder  DM (diabetes mellitus)  HTN (hypertension)  Lumbar radiculopathy  Left heart failure  Reflux esophagitis  Coronary atherosclerosis  History of cardiac catheterization  No significant past surgical history    FAMILY HISTORY:  No pertinent family history in first degree relatives    PAST SOCIAL HISTORY:    ALLERGIES & MEDICATIONS  --------------------------------------------------------------------------------  Allergies    No Known Allergies    Intolerances      Standing Inpatient Medications  acetaminophen  IVPB. 1000 milliGRAM(s) IV Intermittent once  ALBUTerol    90 MICROgram(s) HFA Inhaler 2 Puff(s) Inhalation every 6 hours  amiodarone    Tablet 200 milliGRAM(s) Oral daily  aspirin  chewable 81 milliGRAM(s) Oral daily  atorvastatin 40 milliGRAM(s) Oral at bedtime  busPIRone 5 milliGRAM(s) Oral two times a day  clopidogrel Tablet 75 milliGRAM(s) Oral daily  dextrose 5%. 1000 milliLiter(s) IV Continuous <Continuous>  dextrose 50% Injectable 12.5 Gram(s) IV Push once  dextrose 50% Injectable 25 Gram(s) IV Push once  dextrose 50% Injectable 25 Gram(s) IV Push once  heparin  Injectable 5000 Unit(s) SubCutaneous every 8 hours  insulin lispro (HumaLOG) corrective regimen sliding scale   SubCutaneous three times a day before meals  insulin lispro (HumaLOG) corrective regimen sliding scale   SubCutaneous at bedtime  metoprolol tartrate 25 milliGRAM(s) Oral two times a day  multivitamin 1 Tablet(s) Oral daily  tamsulosin 0.4 milliGRAM(s) Oral at bedtime  tiotropium 18 MICROgram(s) Capsule 1 Capsule(s) Inhalation daily    PRN Inpatient Medications  dextrose 40% Gel 15 Gram(s) Oral once PRN  glucagon  Injectable 1 milliGRAM(s) IntraMuscular once PRN  melatonin 3 milliGRAM(s) Oral at bedtime PRN      REVIEW OF SYSTEMS  --------------------------------------------------------------------------------  Unable to obtain.     VITALS/PHYSICAL EXAM  --------------------------------------------------------------------------------  T(C): 36.9 (09-03-18 @ 14:51), Max: 36.9 (09-03-18 @ 14:51)  HR: 72 (09-03-18 @ 15:41) (72 - 85)  BP: 105/58 (09-03-18 @ 14:51) (105/58 - 128/74)  RR: 18 (09-03-18 @ 14:51) (15 - 22)  SpO2: 100% (09-03-18 @ 15:41) (99% - 100%)  Wt(kg): --        09-03-18 @ 07:01  -  09-03-18 @ 16:16  --------------------------------------------------------  IN: 800 mL / OUT: 2510 mL / NET: -1710 mL      Physical Exam:  	  Gen: Elderly male, on BIPAP  	HEENT: anicteric  	Pulm: Rales present over both lungs.   	CV:  S1S2 rrr  	Abd: +BS, soft   	Ext: No B/L Lower ext edema  	Neuro: No focal deficits  	Skin: Warm, without rashes  	Vascular access: Right UE AVF present; thrill and bruit heard.       LABS/STUDIES  --------------------------------------------------------------------------------              8.3    6.16  >-----------<  248      [09-03-18 @ 12:21]              26.9     143  |  99  |  74  ----------------------------<  228      [09-03-18 @ 05:11]  4.8   |  23  |  5.91        Ca     9.3     [09-03-18 @ 05:11]    TPro  6.8  /  Alb  3.5  /  TBili  0.3  /  DBili  x   /  AST  14  /  ALT  16  /  AlkPhos  90  [09-03-18 @ 05:11]    PT/INR: PT 12.2 , INR 1.06       [09-03-18 @ 05:11]  PTT: 33.7       [09-03-18 @ 05:11]    CK 34      [09-03-18 @ 05:11]    Creatinine Trend:  SCr 5.91 [09-03 @ 05:11]          HBsAg NEGATIVE      [09-03-18 @ 10:16] [facility] Division of Kidney Diseases & Hypertension  INITIAL CONSULT NOTE  [phone]--------------------------------------------------------------------------------    HPI: 73 y/o M with CAD s/p 12-14 stents placed in 1990's, and BABAR x 5 placed in Sept 2017, CHF with EF 20%, DM, HTN, HLD, AF on coumadin, CVA , ESRD on HD (MWF) COPD, bipolar disorder, dementia was sent from rehab center due to SOB. Nephrology was consulted for management of ESRD. Last HD was done on 8/31 at outpatient HD center. Patient currently on BIPAP; unable to obtain further history.         PAST HISTORY  --------------------------------------------------------------------------------  PAST MEDICAL & SURGICAL HISTORY:  CVA (cerebral vascular accident)  COPD (chronic obstructive pulmonary disease)  BPH (benign prostatic hyperplasia)  Bipolar affective disorder  CKD (chronic kidney disease)  Pancreatitis  Hypertension  Dyslipidemia  Diabetes mellitus  Coronary artery disease  HLD (hyperlipidemia)  Anemia  Acute renal failure  CKD (chronic kidney disease)  COPD (chronic obstructive pulmonary disease)  Fainting  Cardiac arrhythmia  Dizziness  Hydronephrosis  Cholecystitis  Bipolar 1 disorder  DM (diabetes mellitus)  HTN (hypertension)  Lumbar radiculopathy  Left heart failure  Reflux esophagitis  Coronary atherosclerosis  History of cardiac catheterization  No significant past surgical history    FAMILY HISTORY:  No pertinent family history in first degree relatives    PAST SOCIAL HISTORY: no reported tobacco or alcohol use, per records, but patient unable to provide    ALLERGIES & MEDICATIONS  --------------------------------------------------------------------------------  Allergies    No Known Allergies    Intolerances      Standing Inpatient Medications  acetaminophen  IVPB. 1000 milliGRAM(s) IV Intermittent once  ALBUTerol    90 MICROgram(s) HFA Inhaler 2 Puff(s) Inhalation every 6 hours  amiodarone    Tablet 200 milliGRAM(s) Oral daily  aspirin  chewable 81 milliGRAM(s) Oral daily  atorvastatin 40 milliGRAM(s) Oral at bedtime  busPIRone 5 milliGRAM(s) Oral two times a day  clopidogrel Tablet 75 milliGRAM(s) Oral daily  dextrose 5%. 1000 milliLiter(s) IV Continuous <Continuous>  dextrose 50% Injectable 12.5 Gram(s) IV Push once  dextrose 50% Injectable 25 Gram(s) IV Push once  dextrose 50% Injectable 25 Gram(s) IV Push once  heparin  Injectable 5000 Unit(s) SubCutaneous every 8 hours  insulin lispro (HumaLOG) corrective regimen sliding scale   SubCutaneous three times a day before meals  insulin lispro (HumaLOG) corrective regimen sliding scale   SubCutaneous at bedtime  metoprolol tartrate 25 milliGRAM(s) Oral two times a day  multivitamin 1 Tablet(s) Oral daily  tamsulosin 0.4 milliGRAM(s) Oral at bedtime  tiotropium 18 MICROgram(s) Capsule 1 Capsule(s) Inhalation daily    PRN Inpatient Medications  dextrose 40% Gel 15 Gram(s) Oral once PRN  glucagon  Injectable 1 milliGRAM(s) IntraMuscular once PRN  melatonin 3 milliGRAM(s) Oral at bedtime PRN      REVIEW OF SYSTEMS  --------------------------------------------------------------------------------  Unable to obtain.     VITALS/PHYSICAL EXAM  --------------------------------------------------------------------------------  T(C): 36.9 (09-03-18 @ 14:51), Max: 36.9 (09-03-18 @ 14:51)  HR: 72 (09-03-18 @ 15:41) (72 - 85)  BP: 105/58 (09-03-18 @ 14:51) (105/58 - 128/74)  RR: 18 (09-03-18 @ 14:51) (15 - 22)  SpO2: 100% (09-03-18 @ 15:41) (99% - 100%)  Wt(kg): --        09-03-18 @ 07:01  -  09-03-18 @ 16:16  --------------------------------------------------------  IN: 800 mL / OUT: 2510 mL / NET: -1710 mL      Physical Exam:  	  Gen: Elderly male, on BIPAP  	HEENT: anicteric  	Pulm: Rales present over both lungs.   	CV:  S1S2 rrr  	Abd: +BS, soft   	Ext: No B/L Lower ext edema  	Neuro: No focal deficits  	Skin: Warm, without rashes  	Vascular access: Right UE AVF present; thrill and bruit heard.       LABS/STUDIES  --------------------------------------------------------------------------------              8.3    6.16  >-----------<  248      [09-03-18 @ 12:21]              26.9     143  |  99  |  74  ----------------------------<  228      [09-03-18 @ 05:11]  4.8   |  23  |  5.91        Ca     9.3     [09-03-18 @ 05:11]    TPro  6.8  /  Alb  3.5  /  TBili  0.3  /  DBili  x   /  AST  14  /  ALT  16  /  AlkPhos  90  [09-03-18 @ 05:11]    PT/INR: PT 12.2 , INR 1.06       [09-03-18 @ 05:11]  PTT: 33.7       [09-03-18 @ 05:11]    CK 34      [09-03-18 @ 05:11]    Creatinine Trend:  SCr 5.91 [09-03 @ 05:11]          HBsAg NEGATIVE      [09-03-18 @ 10:16]

## 2018-09-03 NOTE — ED ADULT NURSE NOTE - OBJECTIVE STATEMENT
pt received alert and pt received alert and oriented x3. pt was sent from [place] for low o2 saturations. pt received on non rebreather aturation at 100%. respirations are labored. b/l crackles in lungs. pt unable to participate in interview due to labored breathing. md at bedside for eval. respiratory called for bipap. 20g placed in left wrist.labs drawn and sent. nsr on cm. md at bedside for eval. pt received alert and oriented x3. pt was sent from [place] for low o2 saturations. pt received on non rebreather aturation at 100%. respirations are labored. b/l crackles in lungs. pt unable to participate in interview due to labored breathing. stage 1 ulcer noted on right scrotum, otherwise skin intact. md at bedside for eval. respiratory called for bipap. 20g placed in left wrist. labs drawn and sent. nsr on cm. md at bedside for eval.

## 2018-09-03 NOTE — H&P ADULT - NSHPSOCIALHISTORY_GEN_ALL_CORE
Marital Status:  (   )    (   ) Single    (   )    (  )   Occupation:   Lives with: (  ) alone  (  ) children   (  ) spouse   (  ) parents  (  ) other  Substance Use (street drugs): (  ) never used  (  ) other:  Tobacco Usage:  (   ) never smoked   (   ) former smoker   (   ) current smoker  (     ) pack year  (        ) last cigarette date  Alcohol Usage:  Sexual History: Marital Status:  (   )    (   ) Single    (   )    (  )   Occupation:   Lives with: ( x ) alone  (  ) children   (  ) spouse   (  ) parents  (  ) other  Substance Use (street drugs): (  ) never used  (  ) other:  Tobacco Usage:  (   ) never smoked   (   ) former smoker   (   ) current smoker  (     ) pack year  (        ) last cigarette date  Alcohol Usage: unable to obtain  Sexual History: unable to obtain

## 2018-09-03 NOTE — ED ADULT NURSE NOTE - NSIMPLEMENTINTERV_GEN_ALL_ED
Implemented All Fall Risk Interventions:  [place] to call system. Call bell, personal items and telephone within reach. Instruct patient to call for assistance. Room bathroom lighting operational. Non-slip footwear when patient is off stretcher. Physically safe environment: no spills, clutter or unnecessary equipment. Stretcher in lowest position, wheels locked, appropriate side rails in place. Provide visual cue, wrist band, yellow gown, etc. Monitor gait and stability. Monitor for mental status changes and reorient to person, place, and time. Review medications for side effects contributing to fall risk. Reinforce activity limits and safety measures with patient and family.

## 2018-09-03 NOTE — PATIENT PROFILE ADULT. - INTERPRETATION SERVICES DECLINED
Patient on cont bipap lethargic at current time will reassess when more alert. Patient speaks some english

## 2018-09-03 NOTE — CONSULT NOTE ADULT - SUBJECTIVE AND OBJECTIVE BOX
CHIEF COMPLAINT:  SOB    HPI:  73 M with CAD s/p multiple PCI, ESRD, CVA, Afib (not on A/C), CHF (EF 20%) here from [place] with SOB. Uses 4 L NC at [place]. SOB onset around 2 days ago. Daughter at bedside who provided history. She states this happens from time to time and improved with dialysis. Patient seen and examined during dialysis session. By this time his symptoms have improved and he is back to his baseline. Saturating 100% on 3 L NC. Patient is not having chest pain, fevers. Does have some LE swelling.      PAST MEDICAL & SURGICAL HISTORY:  CVA (cerebral vascular accident)  COPD (chronic obstructive pulmonary disease)  BPH (benign prostatic hyperplasia)  Bipolar affective disorder  CKD (chronic kidney disease)  Pancreatitis  Hypertension  Dyslipidemia  Diabetes mellitus  Coronary artery disease  HLD (hyperlipidemia)  Anemia  Acute renal failure  CKD (chronic kidney disease)  COPD (chronic obstructive pulmonary disease)  Fainting  Cardiac arrhythmia  Dizziness  Hydronephrosis  Cholecystitis  Bipolar 1 disorder  DM (diabetes mellitus)  HTN (hypertension)  Lumbar radiculopathy  Left heart failure  Reflux esophagitis  Coronary atherosclerosis  History of cardiac catheterization  No significant past surgical history      FAMILY HISTORY:  No pertinent family history in first degree relatives      SOCIAL HISTORY:  Smoking: [x] Never Smoked [ ] Former Smoker (__ packs x ___ years) [ ] Current Smoker  (__ packs x ___ years)  Substance Use: [x] Never Used [ ] Used ____  EtOH Use: Daughter denies  Marital Status: [ ] Single [ ]  [ ]  [ ]   Sexual History:   Occupation: Retired, former knife maker  Recent Travel:  Country of Birth:  Advance Directives:    Allergies    No Known Allergies    Intolerances        HOME MEDICATIONS:    REVIEW OF SYSTEMS:  Constitutional: [ ] negative [-] fevers [-] chills [ ] weight loss [ ] weight gain  HEENT: [ ] negative [ ] dry eyes [ ] eye irritation [ ] postnasal drip [ ] nasal congestion  CV: [ ] negative  [-] chest pain [-] orthopnea [-] palpitations [ ] murmur  Resp: [ ] negative [-] cough [-] shortness of breath [ ] dyspnea [-] wheezing [-] sputum [-] hemoptysis  GI: [ ] negative [-] nausea [-] vomiting [-] diarrhea [-] constipation [-] abd pain [ ] dysphagia   : [ ] negative [-] dysuria [ ] nocturia [ ] hematuria [ ] increased urinary frequency  Musculoskeletal: [ ] negative [ ] back pain [-] myalgias [-] arthralgias [ ] fracture  Skin: [ ] negative [-] rash [ ] itch  Neurological: [ ] negative [-] headache [-] dizziness [ ] syncope [ ] weakness [ ] numbness  Psychiatric: [ ] negative [ ] anxiety [ ] depression  Endocrine: [ ] negative [ ] diabetes [ ] thyroid problem  Hematologic/Lymphatic: [ ] negative [ ] anemia [ ] bleeding problem  Allergic/Immunologic: [ ] negative [ ] itchy eyes [ ] nasal discharge [ ] hives [ ] angioedema  [ ] All other systems negative  [ ] Unable to assess ROS because ________    OBJECTIVE:  ICU Vital Signs Last 24 Hrs  T(C): 36.9 (03 Sep 2018 16:50), Max: 36.9 (03 Sep 2018 14:51)  T(F): 98.4 (03 Sep 2018 16:50), Max: 98.4 (03 Sep 2018 14:51)  HR: 83 (03 Sep 2018 16:50) (72 - 85)  BP: 112/51 (03 Sep 2018 16:50) (105/58 - 128/74)  BP(mean): --  ABP: --  ABP(mean): --  RR: 18 (03 Sep 2018 16:50) (15 - 22)  SpO2: 100% (03 Sep 2018 16:50) (99% - 100%)        09-03 @ 07:01  -  09-03 @ 19:09  --------------------------------------------------------  IN: 926 mL / OUT: 2510 mL / NET: -1584 mL      CAPILLARY BLOOD GLUCOSE      POCT Blood Glucose.: 152 mg/dL (03 Sep 2018 17:13)      PHYSICAL EXAM:  General: No apparent distress  HEENT: NC/AT  Neck: Supple  Respiratory: CTAB  Cardiovascular: RRR, no murmur, 1+ LE edema  Abdomen: Soft, nontender, nondistended  Extremities: Warm  Skin: Intact, Feet are well groomed  Neurological: A&Ox2 (name and place)      HOSPITAL MEDICATIONS:  aspirin  chewable 81 milliGRAM(s) Oral daily  clopidogrel Tablet 75 milliGRAM(s) Oral daily  heparin  Injectable 5000 Unit(s) SubCutaneous every 8 hours      amiodarone    Tablet 200 milliGRAM(s) Oral daily  metoprolol tartrate 25 milliGRAM(s) Oral two times a day  tamsulosin 0.4 milliGRAM(s) Oral at bedtime    atorvastatin 40 milliGRAM(s) Oral at bedtime  dextrose 40% Gel 15 Gram(s) Oral once PRN  dextrose 50% Injectable 12.5 Gram(s) IV Push once  dextrose 50% Injectable 25 Gram(s) IV Push once  dextrose 50% Injectable 25 Gram(s) IV Push once  glucagon  Injectable 1 milliGRAM(s) IntraMuscular once PRN  insulin lispro (HumaLOG) corrective regimen sliding scale   SubCutaneous three times a day before meals  insulin lispro (HumaLOG) corrective regimen sliding scale   SubCutaneous at bedtime    ALBUTerol    90 MICROgram(s) HFA Inhaler 2 Puff(s) Inhalation every 6 hours  tiotropium 18 MICROgram(s) Capsule 1 Capsule(s) Inhalation daily    acetaminophen  IVPB. 1000 milliGRAM(s) IV Intermittent once  busPIRone 5 milliGRAM(s) Oral two times a day  melatonin 3 milliGRAM(s) Oral at bedtime PRN          dextrose 5%. 1000 milliLiter(s) IV Continuous <Continuous>  multivitamin 1 Tablet(s) Oral daily            LABS:                        8.3    6.16  )-----------( 248      ( 03 Sep 2018 12:21 )             26.9     Hgb Trend: 8.3<--, 9.6<--  09-03    143  |  99  |  74<H>  ----------------------------<  228<H>  4.8   |  23  |  5.91<H>    Ca    9.3      03 Sep 2018 05:11    TPro  6.8  /  Alb  3.5  /  TBili  0.3  /  DBili  x   /  AST  14  /  ALT  16  /  AlkPhos  90  09-03    Creatinine Trend: 5.91<--  PT/INR - ( 03 Sep 2018 05:11 )   PT: 12.2 SEC;   INR: 1.06          PTT - ( 03 Sep 2018 05:11 )  PTT:33.7 SEC    Arterial Blood Gas:  09-03 @ 17:54  7.51/36/104/29/97.6/5.1  ABG lactate: --    Venous Blood Gas:  09-03 @ 10:16  7.26/61/42/23/62.3  VBG Lactate: 4.1  Venous Blood Gas:  09-03 @ 05:11  7.30/54/51/24/77.0  VBG Lactate: 2.9      MICROBIOLOGY:     RADIOLOGY:  [x] Reviewed and interpreted by me    Some pulmonary vascular congestion on CXR    ASSESSMENT AND RECOMMENDATION:    73 M with CAD s/p multiple PCI, ESRD, CVA, Afib (not on A/C), CHF (EF 20%) here from [place] with SOB due to acute on chronic HF exacerbation.    Feels back to his baseline after dialysis session  Seen again after dialysis session in evening and he remains not SOB  Saturating well on 3L NC now (uses 4 at [place])  No active pulmonary issues  Please call with questions    Colton Mcdaniels MD  Pulmonary and Critical Care Fellow  [phone]

## 2018-09-03 NOTE — CONSULT NOTE ADULT - ATTENDING COMMENTS
Agree with above. Patient with ESRD who was volume overloaded. Got HD and doing better and now on 2L of oxygen. Please follow up with [facility] given ESRD and HF history. Will sign off.
Patient with history of CAD, CHF, DM, HTN, CVA, ESRD on dialysis, presents with shortness of breath.  For dialysis and monitoring of course.  See above for assessment and recommendations

## 2018-09-03 NOTE — ED PROVIDER NOTE - OBJECTIVE STATEMENT
73M h/o Acute renal failure (, Bipolar, BPH, COPD, HTN, CAD presents from [facility] for low O2 sat (90s). He is on 4L O2 at [facility]. He presents alone, Libyan speaking, states that he has been having SOB since last night also admits to chest pain. Patient is poor historian.   Supervisor states that the reason for sending him was because he looked like he was more short of breath, using more accessory muscles. Dialysis last friday.

## 2018-09-03 NOTE — ED ADULT NURSE NOTE - CHIEF COMPLAINT QUOTE
Patient from [facility] c/o sob x1day. O2sat low 90s on 4L NC in [place]. Patient received with NRB. 1 duoneb given by EMS. Hx. A.Fib., COPD,CVA (right side residuals), DNR/DNI, ESRD, Fistula noted to right arm.

## 2018-09-03 NOTE — H&P ADULT - ASSESSMENT
73M h/o ESRD (Dialysis M/W/F), Bipolar, BPH, COPD, HTN, CAD presents from [place] for CHAMPION and desaturation likely 2/2 ADHF 73M h/o ESRD (Dialysis M/W/F), Bipolar, BPH, COPD, HTN, CAD presents from [place] for CHAMPION and desaturation likely 2/2 ADHF. 73M h/o ESRD (Dialysis M/W/F), Bipolar, BPH, COPD, HTN, CAD presents from [place] for [facility] and desaturation likely 2/2 acute decompensated heart failure in the setting of ESRD w/ possible component of cardiorenal syndrome. Now improved following BiPAP and Lasix IVP 40mg x1. 73M h/o ESRD (Dialysis M/W/F), Bipolar, BPH, COPD, HTN, CAD presents from [place] for CHAMPION and desaturation likely 2/2 acute decompensated heart failure vs acute pulmonary edema from underlying ESRD. Now improved following BiPAP and Lasix IVP 40mg x1. 73M h/o ESRD (Dialysis M/W/F), Bipolar, BPH, COPD, HTN, CAD presents from [place] for [facility] and desaturation likely 2/2 acute decompensated heart failure vs acute pulmonary edema from underlying ESRD vs infectious etiology. Now symptomatically improved following BiPAP and Lasix IVP 40mg x1. Lactate uptrending to 4.1.

## 2018-09-03 NOTE — H&P ADULT - PROBLEM SELECTOR PLAN 1
- daily weight  - monitor on telemetry  - diabetic-renal-DASH diet w/ fluid restriction initially presented with worsening SOB; now improved following BiPAP and Lasix 40mg IVP x1. Vitals wnl  - f/u TTE  - daily weights  - strict I&Os  - monitor on telemetry  - diabetic-renal-DASH diet w/ fluid restriction  - goal net negative fluid balance Initially presented with worsening SOB; now improved following BiPAP and Lasix 40mg IVP x1. Vitals wnl  - f/u TTE  - daily weights  - strict I&Os  - monitor on telemetry  - diabetic-renal-DASH diet w/ fluid restriction  - goal net negative fluid balance Initially presented with worsening SOB. Likely 2/2 CHF exacerbation vs fluid overload from ESRD; now improved following BiPAP and Lasix 40mg IVP x1. Vitals wnl  - f/u TTE  - daily weights  - strict I&Os  - monitor on telemetry  - diabetic-renal-DASH diet w/ fluid restriction  - goal net negative fluid balance Initially presented with worsening SOB. Likely 2/2 CHF exacerbation vs fluid overload from ESRD vs PNA; now improved following BiPAP and Lasix 40mg IVP x1. Vitals wnl  - f/u TTE  - daily weights  - strict I&Os  - monitor on telemetry  - diabetic-renal-DASH diet w/ fluid restriction  - goal net negative fluid balance

## 2018-09-03 NOTE — H&P ADULT - NSHPLABSRESULTS_GEN_ALL_CORE
09-03    143  |  99  |  74<H>  ----------------------------<  228<H>  4.8   |  23  |  5.91<H>    Ca    9.3      03 Sep 2018 05:11    TPro  6.8  /  Alb  3.5  /  TBili  0.3  /  DBili  x   /  AST  14  /  ALT  16  /  AlkPhos  90  09-03      PT/INR - ( 03 Sep 2018 05:11 )   PT: 12.2 SEC;   INR: 1.06          PTT - ( 03 Sep 2018 05:11 )  PTT:33.7 SEC                                        9.6    9.03  )-----------( 241      ( 03 Sep 2018 05:11 )             32.0     CAPILLARY BLOOD GLUCOSE

## 2018-09-03 NOTE — H&P ADULT - NSHPREVIEWOFSYSTEMS_GEN_ALL_CORE
CONSTITUTIONAL: No weakness, fevers or chills  EYES/ENT: No visual changes;  No vertigo or throat pain   NECK: No pain or stiffness  RESPIRATORY: No cough, wheezing, hemoptysis; No shortness of breath  CARDIOVASCULAR: No chest pain or palpitations  GASTROINTESTINAL: No abdominal or epigastric pain. No nausea, vomiting, or hematemesis; No diarrhea or constipation. No melena or hematochezia.  GENITOURINARY: No dysuria, frequency or hematuria  NEUROLOGICAL: No numbness or weakness  SKIN: No itching, burning, rashes, or lesions   All other review of systems is negative unless indicated above. Unable to obtain complete ROS

## 2018-09-03 NOTE — H&P ADULT - PROBLEM SELECTOR PLAN 2
Hx of CKD, on dialysis M/W/F, acutely dyspenic at rehab, possibly 2/2 cardiorenal in setting of ADHF  - f/u renal recs  - strict I/O, daily weight, avoid nephrotoxin, hold ACEi/ARB/RCA, diabetic-DASH-renal diet w/ fluid restriction Hx of CKD, on dialysis M/W/F, acutely dyspneic at rehab, possibly 2/2 cardiorenal in setting of ADHF  - f/u renal recs  - strict I/O, daily weight, avoid nephrotoxin, hold ACEi/ARB/RCA, diabetic-DASH-renal diet w/ fluid restriction Hx of CKD, on dialysis M/W/F, acutely dyspneic at rehab, possibly 2/2 cardiorenal in setting of ADHF  -Dialysis today  - f/u renal recs  - strict I/O, daily weight, avoid nephrotoxin, hold ACEi/ARB/RCA, diabetic-DASH-renal diet w/ fluid restriction

## 2018-09-03 NOTE — H&P ADULT - HISTORY OF PRESENT ILLNESS
73M h/o ESRD (Dialysis M/W/F), Bipolar, BPH, COPD, HTN, CAD presents from [facility] for low O2 sat (90s). He is on 4L O2 at [facility]. He presents alone, Nigerien speaking, states that he has been having SOB since last night also admits to chest pain.     Supervisor states that the reason for sending him was because he looked like he was more short of breath, using more accessory muscles.     Dialysis last Friday.    In the ED:  Vital Signs Last 24 Hrs  T(C): 36.7 (09-03-18 @ 04:13), Max: 36.7 (09-03-18 @ 04:13)  T(F): 98 (09-03-18 @ 04:13), Max: 98 (09-03-18 @ 04:13)  HR: 74 (09-03-18 @ 06:52) (72 - 85)  BP: 116/62 (09-03-18 @ 05:26) (116/62 - 128/74)  BP(mean): --  RR: 21 (09-03-18 @ 05:26) (21 - 22)  SpO2: 100% (09-03-18 @ 06:52) (99% - 100%)    Labs:  Imaging:  Received: 74y/o [place]er-speaking M with PMH of CAD(s/p numerous stents; most recently BABAR x5 placed in Sept 2017), ESRD (on HD M/W/F), Bipolar disorder, COPD, BPH, CVA, DM, HLD, HTN, Afib(off coumadin 2/2 to bleed last admission), CHF(with EF of 20%) presented from [facility] for worsening shortness of breath w/ low O2 sat (90s). He is on 4L O2 at [facility]. Patient reports he has been having SOB over the last 2 days. He denies any chest pain. Denies orthopnea, but reports using 3 pillows to sleep at night. Reports that he is able to ambulate. Last dialysis was on Friday.    In the ED:  Vitals: afeb, HR 85, /74, 100% ON RA  Imaging: CXR showing pulmonary edema  Patient was started on BiPAP w/ symptomatic improvement. Remained stable vitally.    Vital Signs Last 24 Hrs  T(C): 36.7 (09-03-18 @ 04:13), Max: 36.7 (09-03-18 @ 04:13)  T(F): 98 (09-03-18 @ 04:13), Max: 98 (09-03-18 @ 04:13)  HR: 74 (09-03-18 @ 06:52) (72 - 85)  BP: 116/62 (09-03-18 @ 05:26) (116/62 - 128/74)  BP(mean): --  RR: 21 (09-03-18 @ 05:26) (21 - 22)  SpO2: 100% (09-03-18 @ 06:52) (99% - 100%) 74 y/o Omani speaking male with PMH of CAD (s/p numerous stents; most recently BABAR x5 placed in Sept 2017), ESRD (on HD M/W/F), Afib (off coumadin 2/2 to bleed last admission), CHF(EF 20% 09/2017) Bipolar disorder, COPD, BPH, CVA, DM, HLD, and HTN presented from [facility] for worsening shortness of breath w/ low O2 sat (90s). He is on 4L O2 at [facility]. Patient reports he has been having SOB over the last 2 days. He denies any chest pain. Denies orthopnea, but reports using 3 pillows to sleep at night. Reports that he is able to ambulate. Last dialysis was on Friday.     In the ED:  Vitals: afeb, HR 85, /74, 100% ON RA  Imaging: CXR showing pulmonary edema  Patient was started on BiPAP w/ symptomatic improvement. Remained stable vitally.    Vital Signs Last 24 Hrs  T(C): 36.7 (09-03-18 @ 04:13), Max: 36.7 (09-03-18 @ 04:13)  T(F): 98 (09-03-18 @ 04:13), Max: 98 (09-03-18 @ 04:13)  HR: 74 (09-03-18 @ 06:52) (72 - 85)  BP: 116/62 (09-03-18 @ 05:26) (116/62 - 128/74)  BP(mean): --  RR: 21 (09-03-18 @ 05:26) (21 - 22)  SpO2: 100% (09-03-18 @ 06:52) (99% - 100%) Ramiro Perkins PGY1, Pager #[phone]    72 y/o Haitian speaking male with PMH of CAD (s/p numerous stents; most recently BABAR x5 placed in Sept 2017), ESRD (on HD M/W/F), Afib (off coumadin 2/2 to bleed last admission), CHF(EF 20% 09/2017) Bipolar disorder, COPD, BPH, CVA, DM, HLD, and HTN presented from [facility] for worsening shortness of breath w/ low O2 sat (90s). He is on 4L O2 at [facility]. Patient reports he has been having SOB over the last 2 days. He denies any chest pain. Denies orthopnea, but reports using 3 pillows to sleep at night. Reports that he is able to ambulate. Last dialysis was on Friday.     In the ED:  Vitals: afeb, HR 85, /74, 100% ON RA  Imaging: CXR showing pulmonary edema  Patient was started on BiPAP w/ symptomatic improvement. Remained stable vitally.    Vital Signs Last 24 Hrs  T(C): 36.7 (09-03-18 @ 04:13), Max: 36.7 (09-03-18 @ 04:13)  T(F): 98 (09-03-18 @ 04:13), Max: 98 (09-03-18 @ 04:13)  HR: 74 (09-03-18 @ 06:52) (72 - 85)  BP: 116/62 (09-03-18 @ 05:26) (116/62 - 128/74)  BP(mean): --  RR: 21 (09-03-18 @ 05:26) (21 - 22)  SpO2: 100% (09-03-18 @ 06:52) (99% - 100%) Ramiro Perkins PGY1, Pager #[phone]    74 y/o Montenegrin speaking male with PMH of CAD (s/p numerous stents; most recently BABAR x5 placed in Sept 2017), ESRD (on HD M/W/F), Afib (off coumadin 2/2 to bleed last admission), CHF(EF 20% 09/2017) Bipolar disorder, COPD, BPH, CVA, DM, HLD, and HTN presented from [facility] for worsening shortness of breath w/ low O2 sat (90s). He is on 4L O2 at [facility]. Patient reports he has been having SOB over the last 2 days. He denies any chest pain. Denies orthopnea, but reports using 3 pillows to sleep at night. Reports that he is able to ambulate. Last dialysis was on Friday. Of note, patient had a recent admission in July w/ similar presentation; was dialyzed and diuresed w/ clinical improvement at that time. Patient had been previously on anticoagulation for Afib, which had been stopped due to GI bleeding.    In the ED:  Vitals: afeb, HR 85, /74, 100% ON RA  Imaging: CXR showing pulmonary edema  Patient was started on BiPAP w/ symptomatic improvement. Remained stable vitally.    Vital Signs Last 24 Hrs  T(C): 36.7 (09-03-18 @ 04:13), Max: 36.7 (09-03-18 @ 04:13)  T(F): 98 (09-03-18 @ 04:13), Max: 98 (09-03-18 @ 04:13)  HR: 74 (09-03-18 @ 06:52) (72 - 85)  BP: 116/62 (09-03-18 @ 05:26) (116/62 - 128/74)  BP(mean): --  RR: 21 (09-03-18 @ 05:26) (21 - 22)  SpO2: 100% (09-03-18 @ 06:52) (99% - 100%)

## 2018-09-03 NOTE — CONSULT NOTE ADULT - PROBLEM SELECTOR RECOMMENDATION 9
Patient with h/o ESRD on MWF schedule. Last HD was done on 8/31/18 at outpatient HD center. Labs reviewed from today and are acceptable. However patient appears to be in fluid overload. Will arrange for HD today with UF goal of 2-2.5 kg as tolerated. Monitor BMP daily.

## 2018-09-03 NOTE — ED ADULT TRIAGE NOTE - CHIEF COMPLAINT QUOTE
Patient from [facility] c/o sob x1day. O2sat low 90s on 4L NC in [place]. Patient received with NRB. 1 duoneb given by EMS. Hx. A.Fib., COPD,CVA (right side residuals), DNR/DNI, ESRD, Fistula noted to right arm.

## 2018-09-03 NOTE — H&P ADULT - PROBLEM SELECTOR PLAN 10
Occupational Therapy         Patient Name: Elba He  [id number] Date: 4/3/2018      OT ORDERS RECEIVED - NO ACUTE OT NEEDS INDICATED AT THIS TIME- OK FOR D/C HOME WITH FAMILY SUPPORT WHEN MEDICALLY CLEARED- D/C FROM [facility]  Petr Serrato, OT DVT: heparin subQ given ESRD   Dispo: Tele  Consult: Nephrology  Code: DNR/DNI - will clarify

## 2018-09-03 NOTE — H&P ADULT - NSHPPHYSICALEXAM_GEN_ALL_CORE
Vital Signs Last 24 Hrs  T(C): 36.7 (09-03-18 @ 04:13), Max: 36.7 (09-03-18 @ 04:13)  T(F): 98 (09-03-18 @ 04:13), Max: 98 (09-03-18 @ 04:13)  HR: 74 (09-03-18 @ 06:52) (72 - 85)  BP: 116/62 (09-03-18 @ 05:26) (116/62 - 128/74)  BP(mean): --  RR: 21 (09-03-18 @ 05:26) (21 - 22)  SpO2: 100% (09-03-18 @ 06:52) (99% - 100%)    PHYSICAL EXAM:  GENERAL: NAD, well-groomed, well-developed  HEAD:  Atraumatic, Normocephalic  EYES: EOMI, PERRLA, conjunctiva and sclera clear  ENMT: No tonsillar erythema, exudates, or enlargement; Moist mucous membranes, Good dentition, No lesions  NECK: Supple, No JVD, Normal thyroid  CHEST/LUNG: Clear to percussion bilaterally; No rales, rhonchi, wheezing, or rubs  HEART: Regular rate and rhythm; No murmurs, rubs, or gallops  ABDOMEN: Soft, Nontender, Nondistended; Bowel sounds present  EXTREMITIES:  2+ Peripheral Pulses, No clubbing, cyanosis, or edema  LYMPH: No lymphadenopathy noted  SKIN: No rashes or lesions  NERVOUS SYSTEM:  Alert & Oriented X3, Good concentration; Motor Strength 5/5 B/L upper and lower extremities; DTRs 2+ intact and symmetric Vital Signs Last 24 Hrs  T(C): 36.7 (09-03-18 @ 04:13), Max: 36.7 (09-03-18 @ 04:13)  T(F): 98 (09-03-18 @ 04:13), Max: 98 (09-03-18 @ 04:13)  HR: 74 (09-03-18 @ 06:52) (72 - 85)  BP: 116/62 (09-03-18 @ 05:26) (116/62 - 128/74)  BP(mean): --  RR: 21 (09-03-18 @ 05:26) (21 - 22)  SpO2: 100% (09-03-18 @ 06:52) (99% - 100%)    PHYSICAL EXAM:  GENERAL: NAD, on BiPAP  HEAD:  Atraumatic, Normocephalic  EYES: EOMI, sclera clear  ENMT: moist mucous membranes  NECK: Supple  CHEST/LUNG: Clear to auscultation bilaterally; No rales, rhonchi, wheezing, or rubs  HEART: Regular rate and rhythm; No murmurs noted  ABDOMEN: Soft, Nontender, Nondistended; Bowel sounds present  EXTREMITIES:  No LE edema  LYMPH: No lymphadenopathy noted  SKIN: No rashes or lesions  NERVOUS SYSTEM:  Alert & Oriented X2, following commands

## 2018-09-03 NOTE — CONSULT NOTE ADULT - PROBLEM SELECTOR RECOMMENDATION 2
Patient with anemia in setting of ESRD. Check serum ferritin, serum iron, and TIBC. Will start patient on epogen 4000 U TIW during HD. Monitor Hb.

## 2018-09-03 NOTE — ED PROVIDER NOTE - ATTENDING CONTRIBUTION TO CARE
I performed a face-to-face evaluation of the patient and performed a history and physical examination. I agree with the history and physical examination.    Sent from [place]. Dialysis. CHF. COPD (on 4 L O2). (B) rhonchi, expiratory wheezing. EDBUS w/ large (B) pleural effusions. Likely CHF exacerbation, DDx: COPD, CHF. Plan: admit, labs, arrange dilaysis.

## 2018-09-04 LAB
ALBUMIN SERPL ELPH-MCNC: 3.3 G/DL — SIGNIFICANT CHANGE UP (ref 3.3–5)
ALP SERPL-CCNC: 81 U/L — SIGNIFICANT CHANGE UP (ref 40–120)
ALT FLD-CCNC: 15 U/L — SIGNIFICANT CHANGE UP (ref 4–41)
APTT BLD: 33.9 SEC — SIGNIFICANT CHANGE UP (ref 27.5–37.4)
AST SERPL-CCNC: 15 U/L — SIGNIFICANT CHANGE UP (ref 4–40)
BASOPHILS # BLD AUTO: 0.02 K/UL — SIGNIFICANT CHANGE UP (ref 0–0.2)
BASOPHILS NFR BLD AUTO: 0.3 % — SIGNIFICANT CHANGE UP (ref 0–2)
BILIRUB SERPL-MCNC: 0.3 MG/DL — SIGNIFICANT CHANGE UP (ref 0.2–1.2)
BUN SERPL-MCNC: 42 MG/DL — HIGH (ref 7–23)
CALCIUM SERPL-MCNC: 9.1 MG/DL — SIGNIFICANT CHANGE UP (ref 8.4–10.5)
CHLORIDE SERPL-SCNC: 98 MMOL/L — SIGNIFICANT CHANGE UP (ref 98–107)
CHOLEST SERPL-MCNC: 106 MG/DL — LOW (ref 120–199)
CO2 SERPL-SCNC: 28 MMOL/L — SIGNIFICANT CHANGE UP (ref 22–31)
CREAT SERPL-MCNC: 4.15 MG/DL — HIGH (ref 0.5–1.3)
EOSINOPHIL # BLD AUTO: 0.17 K/UL — SIGNIFICANT CHANGE UP (ref 0–0.5)
EOSINOPHIL NFR BLD AUTO: 2.6 % — SIGNIFICANT CHANGE UP (ref 0–6)
GLUCOSE SERPL-MCNC: 106 MG/DL — HIGH (ref 70–99)
HCT VFR BLD CALC: 26.2 % — LOW (ref 39–50)
HCT VFR BLD CALC: 26.2 % — LOW (ref 39–50)
HDLC SERPL-MCNC: 33 MG/DL — LOW (ref 35–55)
HGB BLD-MCNC: 7.9 G/DL — LOW (ref 13–17)
HGB BLD-MCNC: 7.9 G/DL — LOW (ref 13–17)
IMM GRANULOCYTES # BLD AUTO: 0.02 # — SIGNIFICANT CHANGE UP
IMM GRANULOCYTES NFR BLD AUTO: 0.3 % — SIGNIFICANT CHANGE UP (ref 0–1.5)
INR BLD: 1.11 — SIGNIFICANT CHANGE UP (ref 0.88–1.17)
LIPID PNL WITH DIRECT LDL SERPL: 54 MG/DL — SIGNIFICANT CHANGE UP
LYMPHOCYTES # BLD AUTO: 1.7 K/UL — SIGNIFICANT CHANGE UP (ref 1–3.3)
LYMPHOCYTES # BLD AUTO: 26.2 % — SIGNIFICANT CHANGE UP (ref 13–44)
MAGNESIUM SERPL-MCNC: 2.1 MG/DL — SIGNIFICANT CHANGE UP (ref 1.6–2.6)
MCHC RBC-ENTMCNC: 29.3 PG — SIGNIFICANT CHANGE UP (ref 27–34)
MCHC RBC-ENTMCNC: 30.2 % — LOW (ref 32–36)
MCV RBC AUTO: 97 FL — SIGNIFICANT CHANGE UP (ref 80–100)
MONOCYTES # BLD AUTO: 0.56 K/UL — SIGNIFICANT CHANGE UP (ref 0–0.9)
MONOCYTES NFR BLD AUTO: 8.6 % — SIGNIFICANT CHANGE UP (ref 2–14)
NEUTROPHILS # BLD AUTO: 4.02 K/UL — SIGNIFICANT CHANGE UP (ref 1.8–7.4)
NEUTROPHILS NFR BLD AUTO: 62 % — SIGNIFICANT CHANGE UP (ref 43–77)
NRBC # FLD: 0 — SIGNIFICANT CHANGE UP
PHOSPHATE SERPL-MCNC: 4.4 MG/DL — SIGNIFICANT CHANGE UP (ref 2.5–4.5)
PLATELET # BLD AUTO: 228 K/UL — SIGNIFICANT CHANGE UP (ref 150–400)
PMV BLD: 9.9 FL — SIGNIFICANT CHANGE UP (ref 7–13)
POTASSIUM SERPL-MCNC: 4 MMOL/L — SIGNIFICANT CHANGE UP (ref 3.5–5.3)
POTASSIUM SERPL-SCNC: 4 MMOL/L — SIGNIFICANT CHANGE UP (ref 3.5–5.3)
PROT SERPL-MCNC: 6.4 G/DL — SIGNIFICANT CHANGE UP (ref 6–8.3)
PROTHROM AB SERPL-ACNC: 12.3 SEC — SIGNIFICANT CHANGE UP (ref 9.8–13.1)
RBC # BLD: 2.7 M/UL — LOW (ref 4.2–5.8)
RBC # FLD: 14.6 % — HIGH (ref 10.3–14.5)
SODIUM SERPL-SCNC: 142 MMOL/L — SIGNIFICANT CHANGE UP (ref 135–145)
TRIGL SERPL-MCNC: 97 MG/DL — SIGNIFICANT CHANGE UP (ref 10–149)
TSH SERPL-MCNC: 8.78 UIU/ML — HIGH (ref 0.27–4.2)
WBC # BLD: 6.49 K/UL — SIGNIFICANT CHANGE UP (ref 3.8–10.5)
WBC # FLD AUTO: 6.49 K/UL — SIGNIFICANT CHANGE UP (ref 3.8–10.5)

## 2018-09-04 PROCEDURE — 71045 X-RAY EXAM CHEST 1 VIEW: CPT | Mod: 26

## 2018-09-04 PROCEDURE — 90935 HEMODIALYSIS ONE EVALUATION: CPT

## 2018-09-04 RX ORDER — ERYTHROPOIETIN 10000 [IU]/ML
2000 INJECTION, SOLUTION INTRAVENOUS; SUBCUTANEOUS
Qty: 0 | Refills: 0 | Status: DISCONTINUED | OUTPATIENT
Start: 2018-09-05 | End: 2018-09-11

## 2018-09-04 RX ORDER — PIPERACILLIN AND TAZOBACTAM 4; .5 G/20ML; G/20ML
3.38 INJECTION, POWDER, LYOPHILIZED, FOR SOLUTION INTRAVENOUS EVERY 12 HOURS
Qty: 0 | Refills: 0 | Status: DISCONTINUED | OUTPATIENT
Start: 2018-09-04 | End: 2018-09-11

## 2018-09-04 RX ORDER — ACETAMINOPHEN 500 MG
650 TABLET ORAL EVERY 6 HOURS
Qty: 0 | Refills: 0 | Status: DISCONTINUED | OUTPATIENT
Start: 2018-09-04 | End: 2018-09-11

## 2018-09-04 RX ADMIN — ALBUTEROL 2 PUFF(S): 90 AEROSOL, METERED ORAL at 05:25

## 2018-09-04 RX ADMIN — TAMSULOSIN HYDROCHLORIDE 0.4 MILLIGRAM(S): 0.4 CAPSULE ORAL at 21:56

## 2018-09-04 RX ADMIN — HEPARIN SODIUM 5000 UNIT(S): 5000 INJECTION INTRAVENOUS; SUBCUTANEOUS at 06:39

## 2018-09-04 RX ADMIN — Medication 5 MILLIGRAM(S): at 18:24

## 2018-09-04 RX ADMIN — Medication 81 MILLIGRAM(S): at 15:20

## 2018-09-04 RX ADMIN — HEPARIN SODIUM 5000 UNIT(S): 5000 INJECTION INTRAVENOUS; SUBCUTANEOUS at 15:20

## 2018-09-04 RX ADMIN — CLOPIDOGREL BISULFATE 75 MILLIGRAM(S): 75 TABLET, FILM COATED ORAL at 15:20

## 2018-09-04 RX ADMIN — Medication 25 MILLIGRAM(S): at 18:24

## 2018-09-04 RX ADMIN — PIPERACILLIN AND TAZOBACTAM 25 GRAM(S): 4; .5 INJECTION, POWDER, LYOPHILIZED, FOR SOLUTION INTRAVENOUS at 18:23

## 2018-09-04 RX ADMIN — Medication 1 TABLET(S): at 15:20

## 2018-09-04 RX ADMIN — ALBUTEROL 2 PUFF(S): 90 AEROSOL, METERED ORAL at 15:20

## 2018-09-04 RX ADMIN — Medication 5 MILLIGRAM(S): at 06:40

## 2018-09-04 RX ADMIN — Medication 25 MILLIGRAM(S): at 06:39

## 2018-09-04 RX ADMIN — ALBUTEROL 2 PUFF(S): 90 AEROSOL, METERED ORAL at 10:11

## 2018-09-04 RX ADMIN — TIOTROPIUM BROMIDE 1 CAPSULE(S): 18 CAPSULE ORAL; RESPIRATORY (INHALATION) at 10:11

## 2018-09-04 NOTE — PHYSICAL THERAPY INITIAL EVALUATION ADULT - ADDITIONAL COMMENTS
Unable to obtain accurate social history secondary to pt. presenting with confusion during subjective history and presenting with difficulty following commands, limited social history obtained through past medical documents, please refer to social work for more attainable social history. Pt. offered interpretive services however, deferred. as per documents, pt. admitted to [facility] from [facility]. Pt. returned supine in bed with all tubes/lines intact, call bell in reach and in NAD.

## 2018-09-04 NOTE — PROGRESS NOTE ADULT - ASSESSMENT
73M h/o ESRD (Dialysis M/W/F), Bipolar, BPH, COPD, HTN, CAD presents from [place] for CHAMPION and desaturation likely 2/2 acute decompensated heart failure vs acute pulmonary edema from underlying ESRD vs infectious etiology. Now symptomatically improved following BiPAP and Lasix IVP 40mg x1. Lactate uptrended to 4.1.

## 2018-09-04 NOTE — CONSULT NOTE ADULT - ASSESSMENT
EKG - NSR no significant STT changes  Echo - 6/18- EF moderately decreased      a/p     1) Acute on chronic systolic CHF - clinically improving , continue volume removal with HD, recent echo shows mod LV dysfunction  mild troponin elevation likely sec to ESRD and CHF. cont with BB . no acei or arb due to borderline low bp    2) CAD s/p PCI - on ASA and plavix ,     3) ESRD on HD    4)  Afib - h/o GI bleed and anemia off coumadin, on ASA and plavix

## 2018-09-04 NOTE — PROGRESS NOTE ADULT - PROBLEM SELECTOR PLAN 1
Initially presented with worsening SOB. Likely 2/2 CHF exacerbation vs fluid overload from ESRD vs PNA; now improved following BiPAP and Lasix 40mg IVP x1. Vitals wnl  - f/u TTE  - daily weights  - strict I&Os  - monitor on telemetry  - diabetic-renal-DASH diet w/ fluid restriction  - goal net negative fluid balance Initially presented with worsening SOB. Likely 2/2 CHF exacerbation vs fluid overload from ESRD vs PNA; now improved following BiPAP and Lasix 40mg IVP x1. Vitals wnl  - f/u TTE  - daily weights  - strict I&Os  - diabetic-renal-DASH diet w/ fluid restriction  - goal net negative fluid balance

## 2018-09-04 NOTE — PROGRESS NOTE ADULT - PROBLEM SELECTOR PLAN 3
- s/p BABAR x5 in Sept 2017  -Troponemia to 500s on this admission 2/2 ESRD and volume overloaded status  - c/w ASA + Plavix + statin

## 2018-09-04 NOTE — PROGRESS NOTE ADULT - SUBJECTIVE AND OBJECTIVE BOX
Ramiro Perkins PGY1, Pager#[phone]      Patient is a 73y old Male who presents with a chief complaint of CHF exacerbation (03 Sep 2018 07:46)      SUBJECTIVE: No acute events overnight. Patient seen at bedside this AM.    REVIEW OF SYSTEMS:  CONSTITUTIONAL: No fever  RESPIRATORY: No cough, chills or hemoptysis; No shortness of breath  CARDIOVASCULAR: No chest pain, palpitations, dizziness, or leg swelling  GASTROINTESTINAL: No abdominal or epigastric pain  GENITOURINARY: No dysuria  NEUROLOGICAL: No headaches  SKIN: No lesions       MEDICATIONS  (STANDING):  acetaminophen  IVPB. 1000 milliGRAM(s) IV Intermittent once  ALBUTerol    90 MICROgram(s) HFA Inhaler 2 Puff(s) Inhalation every 6 hours  amiodarone    Tablet 200 milliGRAM(s) Oral at bedtime  aspirin  chewable 81 milliGRAM(s) Oral daily  atorvastatin 40 milliGRAM(s) Oral at bedtime  busPIRone 5 milliGRAM(s) Oral two times a day  clopidogrel Tablet 75 milliGRAM(s) Oral daily  dextrose 5%. 1000 milliLiter(s) (50 mL/Hr) IV Continuous <Continuous>  dextrose 50% Injectable 12.5 Gram(s) IV Push once  dextrose 50% Injectable 25 Gram(s) IV Push once  dextrose 50% Injectable 25 Gram(s) IV Push once  heparin  Injectable 5000 Unit(s) SubCutaneous every 8 hours  insulin lispro (HumaLOG) corrective regimen sliding scale   SubCutaneous three times a day before meals  insulin lispro (HumaLOG) corrective regimen sliding scale   SubCutaneous at bedtime  metoprolol tartrate 25 milliGRAM(s) Oral two times a day  multivitamin 1 Tablet(s) Oral daily  tamsulosin 0.4 milliGRAM(s) Oral at bedtime  tiotropium 18 MICROgram(s) Capsule 1 Capsule(s) Inhalation daily    MEDICATIONS  (PRN):  dextrose 40% Gel 15 Gram(s) Oral once PRN Blood Glucose LESS THAN 70 milliGRAM(s)/deciliter  glucagon  Injectable 1 milliGRAM(s) IntraMuscular once PRN Glucose LESS THAN 70 milligrams/deciliter  melatonin 3 milliGRAM(s) Oral at bedtime PRN Insomnia        Objective:    Vitals: Vital Signs Last 24 Hrs  T(C): 36.6 (09-04-18 @ 05:21), Max: 36.9 (09-03-18 @ 14:51)  T(F): 97.8 (09-04-18 @ 05:21), Max: 98.4 (09-03-18 @ 14:51)  HR: 71 (09-04-18 @ 05:21) (68 - 83)  BP: 109/60 (09-04-18 @ 06:36) (100/56 - 122/65)  BP(mean): --  RR: 17 (09-04-18 @ 05:21) (15 - 18)  SpO2: 100% (09-04-18 @ 05:21) (98% - 100%)            I&O's Summary    03 Sep 2018 07:01  -  04 Sep 2018 06:41  --------------------------------------------------------  IN: 1044 mL / OUT: 2510 mL / NET: -1466 mL        PHYSICAL EXAM:  GENERAL: NAD  HEAD:  Atraumatic, Normocephalic  CHEST/LUNG: Clear to auscultation bilaterally; No rales or wheezing  HEART: Regular rate and rhythm; No murmurs  ABDOMEN: Soft, nontender, nondistended  EXTREMITIES:  No edema  SKIN: No rashes or lesions  NERVOUS SYSTEM:  Alert & Oriented X2    LABS:  09-03    143  |  99  |  74<H>  ----------------------------<  228<H>  4.8   |  23  |  5.91<H>    Ca    9.3      03 Sep 2018 05:11    TPro  6.8  /  Alb  3.5  /  TBili  0.3  /  DBili  x   /  AST  14  /  ALT  16  /  AlkPhos  90  09-03    PT/INR - ( 03 Sep 2018 05:11 )   PT: 12.2 SEC;   INR: 1.06          PTT - ( 03 Sep 2018 05:11 )  PTT:33.7 SEC                ABG - ( 03 Sep 2018 17:54 )  pH, Arterial: 7.51  pH, Blood: x     /  pCO2: 36    /  pO2: 104   / HCO3: 29    / Base Excess: 5.1   /  SaO2: 97.6                                8.3    6.16  )-----------( 248      ( 03 Sep 2018 12:21 )             26.9                         9.6    9.03  )-----------( 241      ( 03 Sep 2018 05:11 )             32.0     CAPILLARY BLOOD GLUCOSE      POCT Blood Glucose.: 149 mg/dL (03 Sep 2018 21:27)  POCT Blood Glucose.: 152 mg/dL (03 Sep 2018 17:13)  POCT Blood Glucose.: 113 mg/dL (03 Sep 2018 12:50)    RADIOLOGY:  Imaging Personally Reviewed: YES      Consultants involved in case: YES  Consultant(s) Notes Reviewed:  YES  Care Discussed with Consultants/Other Providers       Ramiro Perkins, PGY-1 Ramiro Perkins PGY1, Pager#[phone]      Patient is a 73y old Male who presents with a chief complaint of CHF exacerbation (03 Sep 2018 07:46)      SUBJECTIVE: No acute events overnight. Patient seen at bedside this AM. No acute complaints.     REVIEW OF SYSTEMS:  CONSTITUTIONAL: No fever  RESPIRATORY: No cough, chills or hemoptysis; No shortness of breath  CARDIOVASCULAR: No chest pain, palpitations, dizziness, or leg swelling  GASTROINTESTINAL: No abdominal or epigastric pain  GENITOURINARY: No dysuria  NEUROLOGICAL: No headaches  SKIN: No lesions       MEDICATIONS  (STANDING):  acetaminophen  IVPB. 1000 milliGRAM(s) IV Intermittent once  ALBUTerol    90 MICROgram(s) HFA Inhaler 2 Puff(s) Inhalation every 6 hours  amiodarone    Tablet 200 milliGRAM(s) Oral at bedtime  aspirin  chewable 81 milliGRAM(s) Oral daily  atorvastatin 40 milliGRAM(s) Oral at bedtime  busPIRone 5 milliGRAM(s) Oral two times a day  clopidogrel Tablet 75 milliGRAM(s) Oral daily  dextrose 5%. 1000 milliLiter(s) (50 mL/Hr) IV Continuous <Continuous>  dextrose 50% Injectable 12.5 Gram(s) IV Push once  dextrose 50% Injectable 25 Gram(s) IV Push once  dextrose 50% Injectable 25 Gram(s) IV Push once  heparin  Injectable 5000 Unit(s) SubCutaneous every 8 hours  insulin lispro (HumaLOG) corrective regimen sliding scale   SubCutaneous three times a day before meals  insulin lispro (HumaLOG) corrective regimen sliding scale   SubCutaneous at bedtime  metoprolol tartrate 25 milliGRAM(s) Oral two times a day  multivitamin 1 Tablet(s) Oral daily  tamsulosin 0.4 milliGRAM(s) Oral at bedtime  tiotropium 18 MICROgram(s) Capsule 1 Capsule(s) Inhalation daily    MEDICATIONS  (PRN):  dextrose 40% Gel 15 Gram(s) Oral once PRN Blood Glucose LESS THAN 70 milliGRAM(s)/deciliter  glucagon  Injectable 1 milliGRAM(s) IntraMuscular once PRN Glucose LESS THAN 70 milligrams/deciliter  melatonin 3 milliGRAM(s) Oral at bedtime PRN Insomnia        Objective:    Vitals: Vital Signs Last 24 Hrs  T(C): 36.6 (09-04-18 @ 05:21), Max: 36.9 (09-03-18 @ 14:51)  T(F): 97.8 (09-04-18 @ 05:21), Max: 98.4 (09-03-18 @ 14:51)  HR: 71 (09-04-18 @ 05:21) (68 - 83)  BP: 109/60 (09-04-18 @ 06:36) (100/56 - 122/65)  BP(mean): --  RR: 17 (09-04-18 @ 05:21) (15 - 18)  SpO2: 100% (09-04-18 @ 05:21) (98% - 100%)            I&O's Summary    03 Sep 2018 07:01  -  04 Sep 2018 06:41  --------------------------------------------------------  IN: 1044 mL / OUT: 2510 mL / NET: -1466 mL        PHYSICAL EXAM:  GENERAL: NAD  HEAD:  Atraumatic, Normocephalic  CHEST/LUNG: Clear to auscultation bilaterally; No rales or wheezing  HEART: Regular rate and rhythm; No murmurs  ABDOMEN: Soft, nontender, nondistended  EXTREMITIES:  No edema  SKIN: No rashes or lesions  NERVOUS SYSTEM:  Alert & Oriented X2    LABS:  09-03    143  |  99  |  74<H>  ----------------------------<  228<H>  4.8   |  23  |  5.91<H>    Ca    9.3      03 Sep 2018 05:11    TPro  6.8  /  Alb  3.5  /  TBili  0.3  /  DBili  x   /  AST  14  /  ALT  16  /  AlkPhos  90  09-03    PT/INR - ( 03 Sep 2018 05:11 )   PT: 12.2 SEC;   INR: 1.06          PTT - ( 03 Sep 2018 05:11 )  PTT:33.7 SEC                ABG - ( 03 Sep 2018 17:54 )  pH, Arterial: 7.51  pH, Blood: x     /  pCO2: 36    /  pO2: 104   / HCO3: 29    / Base Excess: 5.1   /  SaO2: 97.6                                8.3    6.16  )-----------( 248      ( 03 Sep 2018 12:21 )             26.9                         9.6    9.03  )-----------( 241      ( 03 Sep 2018 05:11 )             32.0     CAPILLARY BLOOD GLUCOSE      POCT Blood Glucose.: 149 mg/dL (03 Sep 2018 21:27)  POCT Blood Glucose.: 152 mg/dL (03 Sep 2018 17:13)  POCT Blood Glucose.: 113 mg/dL (03 Sep 2018 12:50)    RADIOLOGY:  Imaging Personally Reviewed: YES      Consultants involved in case: YES  Consultant(s) Notes Reviewed:  YES  Care Discussed with Consultants/Other Providers       Ramiro Perkins, PGY-1

## 2018-09-04 NOTE — PHYSICAL THERAPY INITIAL EVALUATION ADULT - PERTINENT HX OF CURRENT PROBLEM, REHAB EVAL
Pt. is a 73 year old male admitted to [facility] secondary to SOB. PMH: CVA, COPD, DM, Anemia, Acute renal failure, CAD.

## 2018-09-04 NOTE — PROGRESS NOTE ADULT - PROBLEM SELECTOR PLAN 2
Hx of CKD, on dialysis M/W/F, acutely dyspneic at rehab, possibly 2/2 cardiorenal in setting of ADHF  -Dialysis today  - f/u renal recs  - strict I/O, daily weight, avoid nephrotoxin, hold ACEi/ARB/RCA, diabetic-DASH-renal diet w/ fluid restriction Hx of CKD, on dialysis M/W/F, acutely dyspneic at rehab, possibly 2/2 cardiorenal in setting of ADHF  -Dialysis tomorrow  - f/u renal recs  - strict I/O, daily weight, avoid nephrotoxin, hold ACEi/ARB/RCA, diabetic-DASH-renal diet w/ fluid restriction

## 2018-09-04 NOTE — CONSULT NOTE ADULT - SUBJECTIVE AND OBJECTIVE BOX
Reid Martinez MD  Interventional Cardiology / Advance Heart Failure and Cardiac Transplant Specialist  [place] Office : [phone] [place] [address] [place] [address]  Tel:   [place] Office : [phone] [facility] [place] [address]  Tel: [phone]  Cell : [phone]    HISTORY OF PRESENT ILLNESS:    74 y/o [place] speaking male with PMH of CAD (s/p numerous stents; most recently BABAR x5 placed in Sept 2017), ESRD (on HD M/W/F), Afib (off coumadin 2/2 to bleed last admission), CHF(EF 20% 09/2017) Bipolar disorder, COPD, BPH, CVA, DM, HLD, and HTN presented from [facility] for worsening shortness of breath w/ low O2 sat (90s). He is on 4L O2 at [facility]. Patient reports he has been having SOB over the last 2 days. He denies any chest pain. Denies orthopnea, but reports using 3 pillows to sleep at night. Reports that he is able to ambulate. Last dialysis was on Friday. Of note, patient had a recent admission in July w/ similar presentation; was dialyzed and diuresed w/ clinical improvement at that time. Patient had been previously on anticoagulation for Afib, which had been stopped due to GI bleeding.    In the ED:  Vitals: afeb, HR 85, /74, 100% ON RA  Imaging: CXR showing pulmonary edema  Patient was started on BiPAP w/ symptomatic improvement. Remained stable vitally    Pt currently off BIPAP doing ok, speaking full sentences, SOB improved    PAST MEDICAL & SURGICAL HISTORY:  CVA (cerebral vascular accident)  COPD (chronic obstructive pulmonary disease)  BPH (benign prostatic hyperplasia)  Bipolar affective disorder  CKD (chronic kidney disease)  Pancreatitis  Hypertension  Dyslipidemia  Diabetes mellitus  Coronary artery disease  HLD (hyperlipidemia)  Anemia  Acute renal failure  CKD (chronic kidney disease)  COPD (chronic obstructive pulmonary disease)  Fainting  Cardiac arrhythmia  Dizziness  Hydronephrosis  Cholecystitis  Bipolar 1 disorder  DM (diabetes mellitus)  HTN (hypertension)  Lumbar radiculopathy  Left heart failure  Reflux esophagitis  Coronary atherosclerosis  History of cardiac catheterization  No significant past surgical history    	    MEDICATIONS:  amiodarone    Tablet 200 milliGRAM(s) Oral at bedtime  aspirin  chewable 81 milliGRAM(s) Oral daily  clopidogrel Tablet 75 milliGRAM(s) Oral daily  heparin  Injectable 5000 Unit(s) SubCutaneous every 8 hours  metoprolol tartrate 25 milliGRAM(s) Oral two times a day  tamsulosin 0.4 milliGRAM(s) Oral at bedtime      ALBUTerol    90 MICROgram(s) HFA Inhaler 2 Puff(s) Inhalation every 6 hours  tiotropium 18 MICROgram(s) Capsule 1 Capsule(s) Inhalation daily    acetaminophen  IVPB. 1000 milliGRAM(s) IV Intermittent once  busPIRone 5 milliGRAM(s) Oral two times a day  melatonin 3 milliGRAM(s) Oral at bedtime PRN      atorvastatin 40 milliGRAM(s) Oral at bedtime  dextrose 40% Gel 15 Gram(s) Oral once PRN  dextrose 50% Injectable 12.5 Gram(s) IV Push once  dextrose 50% Injectable 25 Gram(s) IV Push once  dextrose 50% Injectable 25 Gram(s) IV Push once  glucagon  Injectable 1 milliGRAM(s) IntraMuscular once PRN  insulin lispro (HumaLOG) corrective regimen sliding scale   SubCutaneous three times a day before meals  insulin lispro (HumaLOG) corrective regimen sliding scale   SubCutaneous at bedtime    dextrose 5%. 1000 milliLiter(s) IV Continuous <Continuous>  multivitamin 1 Tablet(s) Oral daily      FAMILY HISTORY:  No pertinent family history in first degree relatives        Allergies    No Known Allergies    Intolerances    	      PHYSICAL EXAM:  T(C): 36.7 (09-04-18 @ 09:10), Max: 36.9 (09-03-18 @ 14:51)  HR: 67 (09-04-18 @ 10:48) (67 - 83)  BP: 100/54 (09-04-18 @ 09:10) (100/54 - 112/51)  RR: 18 (09-04-18 @ 09:10) (17 - 18)  SpO2: 96% (09-04-18 @ 10:48) (96% - 100%)  Wt(kg): --  I&O's Summary    03 Sep 2018 07:01  -  04 Sep 2018 07:00  --------------------------------------------------------  IN: 1044 mL / OUT: 2510 mL / NET: -1466 mL        Appearance: Normal	  HEENT:   Normal oral mucosa, PERRL, EOMI	  Cardiovascular: Normal S1 S2, No JVD, No murmurs, No edema  Respiratory: bibasilar crackles  Gastrointestinal:  Soft, Non-tender, + BS	  Extremities: Normal range of motion, No clubbing, cyanosis or edema    LABS:	 	    CARDIAC MARKERS:                                  7.9    6.49  )-----------( 228      ( 04 Sep 2018 06:12 )             26.2     09-04    142  |  98  |  42<H>  ----------------------------<  106<H>  4.0   |  28  |  4.15<H>    Ca    9.1      04 Sep 2018 06:12  Phos  4.4     09-04  Mg     2.1     09-04    TPro  6.4  /  Alb  3.3  /  TBili  0.3  /  DBili  x   /  AST  15  /  ALT  15  /  AlkPhos  81  09-04    proBNP:   Lipid Profile:   HgA1c:   TSH: Thyroid Stimulating Hormone, Serum: 8.78 uIU/mL (09-04 @ 06:12)      ASSESSMENT/PLAN:

## 2018-09-04 NOTE — PROGRESS NOTE ADULT - SUBJECTIVE AND OBJECTIVE BOX
[facility] Division of Kidney Diseases & Hypertension  FOLLOW UP NOTE  --------------------------------------------------------------------------------  HPI: 73 y/o M with CAD s/p 12-14 stents placed in 1990's, and BABAR x 5 placed in Sept 2017, CHF with EF 20%, DM, HTN, HLD, AF on coumadin, CVA , ESRD on HD (MWF) COPD, bipolar disorder, dementia was sent from rehab center due to SOB. Nephrology was consulted for management of ESRD. Last HD was done on 8/31 at outpatient HD center.  Patient was seen and examined at bedside today resting comfortably on room air.  He has no acute complaints but minimally verbal due to dementia.        PAST HISTORY  --------------------------------------------------------------------------------  No significant changes to PMH, PSH, FHx, SHx, unless otherwise noted    ALLERGIES & MEDICATIONS  --------------------------------------------------------------------------------  Allergies    No Known Allergies    Intolerances      Standing Inpatient Medications  acetaminophen  IVPB. 1000 milliGRAM(s) IV Intermittent once  ALBUTerol    90 MICROgram(s) HFA Inhaler 2 Puff(s) Inhalation every 6 hours  amiodarone    Tablet 200 milliGRAM(s) Oral at bedtime  aspirin  chewable 81 milliGRAM(s) Oral daily  atorvastatin 40 milliGRAM(s) Oral at bedtime  busPIRone 5 milliGRAM(s) Oral two times a day  clopidogrel Tablet 75 milliGRAM(s) Oral daily  dextrose 5%. 1000 milliLiter(s) IV Continuous <Continuous>  dextrose 50% Injectable 12.5 Gram(s) IV Push once  dextrose 50% Injectable 25 Gram(s) IV Push once  dextrose 50% Injectable 25 Gram(s) IV Push once  heparin  Injectable 5000 Unit(s) SubCutaneous every 8 hours  insulin lispro (HumaLOG) corrective regimen sliding scale   SubCutaneous three times a day before meals  insulin lispro (HumaLOG) corrective regimen sliding scale   SubCutaneous at bedtime  metoprolol tartrate 25 milliGRAM(s) Oral two times a day  multivitamin 1 Tablet(s) Oral daily  tamsulosin 0.4 milliGRAM(s) Oral at bedtime  tiotropium 18 MICROgram(s) Capsule 1 Capsule(s) Inhalation daily    PRN Inpatient Medications  dextrose 40% Gel 15 Gram(s) Oral once PRN  glucagon  Injectable 1 milliGRAM(s) IntraMuscular once PRN  melatonin 3 milliGRAM(s) Oral at bedtime PRN      REVIEW OF SYSTEMS  --------------------------------------------------------------------------------  unable to obtain due to patient with dementia and minimally verbal.    All other systems were reviewed and are negative, except as noted.    VITALS/PHYSICAL EXAM  --------------------------------------------------------------------------------  T(C): 36.7 (09-04-18 @ 09:10), Max: 36.9 (09-03-18 @ 14:51)  HR: 67 (09-04-18 @ 10:48) (67 - 83)  BP: 100/54 (09-04-18 @ 09:10) (100/54 - 112/51)  RR: 18 (09-04-18 @ 09:10) (17 - 18)  SpO2: 96% (09-04-18 @ 10:48) (96% - 100%)  Wt(kg): --  Height (cm): 172.72 (09-03-18 @ 09:07)  Weight (kg): 62.7 (09-03-18 @ 09:07)  BMI (kg/m2): 21 (09-03-18 @ 09:07)  BSA (m2): 1.75 (09-03-18 @ 09:07)      09-03-18 @ 07:01  -  09-04-18 @ 07:00  --------------------------------------------------------  IN: 1044 mL / OUT: 2510 mL / NET: -1466 mL      Physical Exam:                Gen: Elderly male, on room air  	HEENT: anicteric  	Pulm: Rales present over only left.  	CV:  S1S2 rrr  	Abd: +BS, soft   	Ext: No B/L Lower ext edema  	Neuro: awake alert responsive  	Skin: Warm, without rashes  	Vascular access: Right UE AVF present; thrill and bruit heard.    LABS/STUDIES  --------------------------------------------------------------------------------              7.9    6.49  >-----------<  228      [09-04-18 @ 06:12]              26.2     142  |  98  |  42  ----------------------------<  106      [09-04-18 @ 06:12]  4.0   |  28  |  4.15        Ca     9.1     [09-04-18 @ 06:12]      Mg     2.1     [09-04-18 @ 06:12]      Phos  4.4     [09-04-18 @ 06:12]    TPro  6.4  /  Alb  3.3  /  TBili  0.3  /  DBili  x   /  AST  15  /  ALT  15  /  AlkPhos  81  [09-04-18 @ 06:12]    PT/INR: PT 12.3 , INR 1.11       [09-04-18 @ 06:12]  PTT: 33.9       [09-04-18 @ 06:12]    CK 34      [09-03-18 @ 05:11]    Creatinine Trend:  SCr 4.15 [09-04 @ 06:12]  SCr 5.91 [09-03 @ 05:11]        TSH 8.78      [09-04-18 @ 06:12]  Lipid: chol 106, TG 97, HDL 33, LDL 54      [09-04-18 @ 06:12]    HBsAg NEGATIVE      [09-03-18 @ 10:16]

## 2018-09-04 NOTE — PHYSICAL THERAPY INITIAL EVALUATION ADULT - PATIENT PROFILE REVIEW, REHAB EVAL
Pt. profile reviewed, consulted with RN Zulay HESS prior to initial PT evaluation and tx, as per RN, Pt. is OK to participate in skilled therapy session, current activity orders bedrest/yes

## 2018-09-04 NOTE — PROGRESS NOTE ADULT - PROBLEM SELECTOR PLAN 1
Patient had emergent HD yesterday with volume overload.  He is now resting comfortably.  There is no emergent HD indication today.  Will continue to monitor blood work daily.

## 2018-09-04 NOTE — PROGRESS NOTE ADULT - PROBLEM SELECTOR PLAN 2
The patient has anemia likely of chronic disease.  Monitor complete blood count.  Hgb below goal but with past history of stroke.  Will start 2000U epogen for now.

## 2018-09-04 NOTE — PROGRESS NOTE ADULT - PROBLEM SELECTOR PLAN 10
DVT: heparin subQ given ESRD   Dispo: Tele  Consult: Nephrology  Code: DNR/DNI - will clarify DVT: heparin subQ given ESRD   Dispo: Tele  Consult: Nephrology  Code: DNR/DNI - will sign form

## 2018-09-04 NOTE — PHYSICAL THERAPY INITIAL EVALUATION ADULT - RANGE OF MOTION EXAMINATION, REHAB EVAL
Right shoulder ROM 0-90 degrees; remainder Right UE WFL, Left UE ROM WFL. Right knee presents with increased muscle tension and fixed in flexion, Pt. able to achieve 15 degrees from full extension upon passive ROM assessment, Remainder of Right LE WFL, Left LE ROM WFL.

## 2018-09-05 ENCOUNTER — TRANSCRIPTION ENCOUNTER (OUTPATIENT)
Age: 73
End: 2018-09-05

## 2018-09-05 LAB
ALBUMIN SERPL ELPH-MCNC: 3.2 G/DL — LOW (ref 3.3–5)
ALP SERPL-CCNC: 79 U/L — SIGNIFICANT CHANGE UP (ref 40–120)
ALT FLD-CCNC: 13 U/L — SIGNIFICANT CHANGE UP (ref 4–41)
AST SERPL-CCNC: 16 U/L — SIGNIFICANT CHANGE UP (ref 4–40)
BASOPHILS # BLD AUTO: 0.03 K/UL — SIGNIFICANT CHANGE UP (ref 0–0.2)
BASOPHILS NFR BLD AUTO: 0.5 % — SIGNIFICANT CHANGE UP (ref 0–2)
BILIRUB SERPL-MCNC: 0.3 MG/DL — SIGNIFICANT CHANGE UP (ref 0.2–1.2)
BUN SERPL-MCNC: 59 MG/DL — HIGH (ref 7–23)
CALCIUM SERPL-MCNC: 9.1 MG/DL — SIGNIFICANT CHANGE UP (ref 8.4–10.5)
CHLORIDE SERPL-SCNC: 98 MMOL/L — SIGNIFICANT CHANGE UP (ref 98–107)
CO2 SERPL-SCNC: 25 MMOL/L — SIGNIFICANT CHANGE UP (ref 22–31)
CREAT SERPL-MCNC: 5.66 MG/DL — HIGH (ref 0.5–1.3)
EOSINOPHIL # BLD AUTO: 0.17 K/UL — SIGNIFICANT CHANGE UP (ref 0–0.5)
EOSINOPHIL NFR BLD AUTO: 2.6 % — SIGNIFICANT CHANGE UP (ref 0–6)
GLUCOSE SERPL-MCNC: 90 MG/DL — SIGNIFICANT CHANGE UP (ref 70–99)
HCT VFR BLD CALC: 27.5 % — LOW (ref 39–50)
HGB BLD-MCNC: 8.4 G/DL — LOW (ref 13–17)
IMM GRANULOCYTES # BLD AUTO: 0.02 # — SIGNIFICANT CHANGE UP
IMM GRANULOCYTES NFR BLD AUTO: 0.3 % — SIGNIFICANT CHANGE UP (ref 0–1.5)
LYMPHOCYTES # BLD AUTO: 1.6 K/UL — SIGNIFICANT CHANGE UP (ref 1–3.3)
LYMPHOCYTES # BLD AUTO: 24.9 % — SIGNIFICANT CHANGE UP (ref 13–44)
MAGNESIUM SERPL-MCNC: 2.2 MG/DL — SIGNIFICANT CHANGE UP (ref 1.6–2.6)
MCHC RBC-ENTMCNC: 29.7 PG — SIGNIFICANT CHANGE UP (ref 27–34)
MCHC RBC-ENTMCNC: 30.5 % — LOW (ref 32–36)
MCV RBC AUTO: 97.2 FL — SIGNIFICANT CHANGE UP (ref 80–100)
MONOCYTES # BLD AUTO: 0.46 K/UL — SIGNIFICANT CHANGE UP (ref 0–0.9)
MONOCYTES NFR BLD AUTO: 7.2 % — SIGNIFICANT CHANGE UP (ref 2–14)
NEUTROPHILS # BLD AUTO: 4.14 K/UL — SIGNIFICANT CHANGE UP (ref 1.8–7.4)
NEUTROPHILS NFR BLD AUTO: 64.5 % — SIGNIFICANT CHANGE UP (ref 43–77)
NRBC # FLD: 0 — SIGNIFICANT CHANGE UP
PHOSPHATE SERPL-MCNC: 4.6 MG/DL — HIGH (ref 2.5–4.5)
PLATELET # BLD AUTO: 237 K/UL — SIGNIFICANT CHANGE UP (ref 150–400)
PMV BLD: 10 FL — SIGNIFICANT CHANGE UP (ref 7–13)
POTASSIUM SERPL-MCNC: 4.1 MMOL/L — SIGNIFICANT CHANGE UP (ref 3.5–5.3)
POTASSIUM SERPL-SCNC: 4.1 MMOL/L — SIGNIFICANT CHANGE UP (ref 3.5–5.3)
PROT SERPL-MCNC: 6.5 G/DL — SIGNIFICANT CHANGE UP (ref 6–8.3)
RBC # BLD: 2.83 M/UL — LOW (ref 4.2–5.8)
RBC # FLD: 14.4 % — SIGNIFICANT CHANGE UP (ref 10.3–14.5)
SODIUM SERPL-SCNC: 142 MMOL/L — SIGNIFICANT CHANGE UP (ref 135–145)
SPECIMEN SOURCE: SIGNIFICANT CHANGE UP
SPECIMEN SOURCE: SIGNIFICANT CHANGE UP
WBC # BLD: 6.42 K/UL — SIGNIFICANT CHANGE UP (ref 3.8–10.5)
WBC # FLD AUTO: 6.42 K/UL — SIGNIFICANT CHANGE UP (ref 3.8–10.5)

## 2018-09-05 PROCEDURE — 93306 TTE W/DOPPLER COMPLETE: CPT | Mod: 26

## 2018-09-05 PROCEDURE — 99232 SBSQ HOSP IP/OBS MODERATE 35: CPT

## 2018-09-05 RX ORDER — TIOTROPIUM BROMIDE 18 UG/1
1 CAPSULE ORAL; RESPIRATORY (INHALATION)
Qty: 0 | Refills: 0 | DISCHARGE
Start: 2018-09-05

## 2018-09-05 RX ORDER — ERYTHROPOIETIN 10000 [IU]/ML
2000 INJECTION, SOLUTION INTRAVENOUS; SUBCUTANEOUS
Qty: 0 | Refills: 0 | DISCHARGE
Start: 2018-09-05

## 2018-09-05 RX ORDER — ALBUTEROL 90 UG/1
2 AEROSOL, METERED ORAL
Qty: 0 | Refills: 0 | DISCHARGE
Start: 2018-09-05

## 2018-09-05 RX ADMIN — ERYTHROPOIETIN 2000 UNIT(S): 10000 INJECTION, SOLUTION INTRAVENOUS; SUBCUTANEOUS at 10:32

## 2018-09-05 RX ADMIN — PIPERACILLIN AND TAZOBACTAM 25 GRAM(S): 4; .5 INJECTION, POWDER, LYOPHILIZED, FOR SOLUTION INTRAVENOUS at 05:39

## 2018-09-05 RX ADMIN — ALBUTEROL 2 PUFF(S): 90 AEROSOL, METERED ORAL at 17:50

## 2018-09-05 RX ADMIN — HEPARIN SODIUM 5000 UNIT(S): 5000 INJECTION INTRAVENOUS; SUBCUTANEOUS at 05:39

## 2018-09-05 RX ADMIN — HEPARIN SODIUM 5000 UNIT(S): 5000 INJECTION INTRAVENOUS; SUBCUTANEOUS at 13:48

## 2018-09-05 RX ADMIN — TAMSULOSIN HYDROCHLORIDE 0.4 MILLIGRAM(S): 0.4 CAPSULE ORAL at 21:24

## 2018-09-05 RX ADMIN — CLOPIDOGREL BISULFATE 75 MILLIGRAM(S): 75 TABLET, FILM COATED ORAL at 11:43

## 2018-09-05 RX ADMIN — Medication 5 MILLIGRAM(S): at 17:49

## 2018-09-05 RX ADMIN — Medication 1 TABLET(S): at 11:49

## 2018-09-05 RX ADMIN — Medication 25 MILLIGRAM(S): at 17:49

## 2018-09-05 RX ADMIN — ALBUTEROL 2 PUFF(S): 90 AEROSOL, METERED ORAL at 05:39

## 2018-09-05 RX ADMIN — AMIODARONE HYDROCHLORIDE 200 MILLIGRAM(S): 400 TABLET ORAL at 21:24

## 2018-09-05 RX ADMIN — PIPERACILLIN AND TAZOBACTAM 25 GRAM(S): 4; .5 INJECTION, POWDER, LYOPHILIZED, FOR SOLUTION INTRAVENOUS at 17:49

## 2018-09-05 RX ADMIN — Medication 81 MILLIGRAM(S): at 11:43

## 2018-09-05 RX ADMIN — Medication 5 MILLIGRAM(S): at 05:39

## 2018-09-05 NOTE — DISCHARGE NOTE ADULT - PATIENT PORTAL LINK FT
You can access the [employer] [facility] Patient Portal, offered by [facility], by registering with the following website: [email or web address]

## 2018-09-05 NOTE — SWALLOW BEDSIDE ASSESSMENT ADULT - COMMENTS
Patient is a 73 year old male Albanian speaking male with PMHx of CAD (s/p numerous stents; most recently BABAR x5 placed in Sept 2017), ESRD (on HD M/W/F), Afib (off coumadin 2/2 to bleed last admission), CHF (EF 20% 09/2017), Bipolar disorder, COPD, BPH, CVA, DM, HLD, and HTN, presented from [facility] for worsening shortness of breath with low O2 sat (90s). He is on 4L O2 at [facility]. Patient reports he has been having SOB over the last 2 days. He denies any chest pain. Denies orthopnea, but reports using 3 pillows to sleep at night. Reports that he is able to ambulate. Last dialysis was on Friday. Of note, patient had a recent admission in July w/ similar presentation; was dialyzed and diuresed w/ clinical improvement at that time. Patient had been previously on anticoagulation for Afib, which had been stopped due to GI bleeding.    Patient is known to this department from prior admission. He was last seen for cinesophagram on 7/2/18, with recommendations for a puree with honey-thick liquids (please see chart for full report). Patient was received awake and alert this AM.     CXR Patient is a 73 year old male Indonesian speaking male with PMHx of CAD (s/p numerous stents; most recently BABAR x5 placed in Sept 2017), ESRD (on HD M/W/F), Afib (off coumadin 2/2 to bleed last admission), CHF (EF 20% 09/2017), Bipolar disorder, COPD, BPH, CVA, DM, HLD, and HTN, presented from [facility] for worsening shortness of breath with low O2 sat (90s). He is on 4L O2 at [facility]. Patient reports he has been having SOB over the last 2 days. He denies any chest pain. Denies orthopnea, but reports using 3 pillows to sleep at night. Reports that he is able to ambulate. Last dialysis was on Friday. Of note, patient had a recent admission in July w/ similar presentation; was dialyzed and diuresed w/ clinical improvement at that time. Patient had been previously on anticoagulation for Afib, which had been stopped due to GI bleeding.    Patient is known to this department from prior admission. He was last seen for cinesophagram on 7/2/18, with recommendations for a puree with honey-thick liquids (please see chart for full report). Patient was received awake and alert this AM. Supplemental O2 via NC in place. As per discussion with primary RN, patient was initially placed on a puree diet with honey-thick liquids upon admission, however after receiving results of CXR he was made NPO. Recommendations discussed with Team 2.     CXR 9/3/18 - Patchy and reticular opacities bilaterally, consistent with pulmonary edema. Patient is a 73 year old male New Zealander speaking male with PMHx of CAD (s/p numerous stents; most recently BABAR x5 placed in Sept 2017), ESRD (on HD M/W/F), Afib (off coumadin 2/2 to bleed last admission), CHF (EF 20% 09/2017), Bipolar disorder, COPD, BPH, CVA, DM, HLD, and HTN, presented from [facility] for worsening shortness of breath with low O2 sat (90s). He is on 4L O2 at [facility]. Patient reports he has been having SOB over the last 2 days. He denies any chest pain. Denies orthopnea, but reports using 3 pillows to sleep at night. Reports that he is able to ambulate. Last dialysis was on Friday. Of note, patient had a recent admission in July w/ similar presentation; was dialyzed and diuresed w/ clinical improvement at that time. Patient had been previously on anticoagulation for Afib, which had been stopped due to GI bleeding.    Patient is known to this department from prior admission. He was last seen for cinesophagram on 7/2/18, with recommendations for a puree with honey-thick liquids (please see chart for full report). Patient was received awake and alert this AM. Patient is primarily New Zealander speaking though able to follow simple gestural cues for the purpose of this evaluation. Supplemental O2 via NC in place. As per discussion with primary RN, patient was initially placed on a puree diet with honey-thick liquids upon admission, however after receiving results of CXR he was made NPO. Recommendations discussed with Team 2.     CXR 9/3/18 - Patchy and reticular opacities bilaterally, consistent with pulmonary edema. Patient is a 73 year old male Syrian speaking male with PMHx of CAD (s/p numerous stents; most recently BABAR x5 placed in Sept 2017), ESRD (on HD M/W/F), Afib (off coumadin 2/2 to bleed last admission), CHF (EF 20% 09/2017), Bipolar disorder, COPD, BPH, CVA, DM, HLD, and HTN, presented from [facility] for worsening shortness of breath with low O2 sat (90s). He is on 4L O2 at [facility]. Patient reports he has been having SOB over the last 2 days. He denies any chest pain. Denies orthopnea, but reports using 3 pillows to sleep at night. Reports that he is able to ambulate. Last dialysis was on Friday. Of note, patient had a recent admission in July w/ similar presentation; was dialyzed and diuresed w/ clinical improvement at that time. Patient had been previously on anticoagulation for Afib, which had been stopped due to GI bleeding.    Patient is known to this department from prior admission. He was last seen for cinesophagram on 7/2/18, with recommendations for a puree with honey-thick liquids (please see chart for full report). Patient was received awake, alert and appearing to breathe comfortably on room air this AM. Patient is primarily Syrian speaking though able to follow simple gestural cues for the purpose of this evaluation. As per discussion with primary RN, patient was initially placed on a puree diet with honey-thick liquids upon admission, however after receiving results of CXR he was made NPO. Recommendations discussed with Team 2.     CXR 9/3/18 - Patchy and reticular opacities bilaterally, consistent with pulmonary edema.

## 2018-09-05 NOTE — SWALLOW BEDSIDE ASSESSMENT ADULT - SWALLOW EVAL: RECOMMENDED DIET
1) NPO with consideration for temporary non-oral means of nutrition/hydration/medication; 2) Cinesophagram to objectively assess the oropharyngeal swallow mechanism and r/o aspiration

## 2018-09-05 NOTE — DISCHARGE NOTE ADULT - CARE PLAN
Principal Discharge DX:	Acute on chronic systolic (congestive) heart failure  Goal:	treat  Assessment and plan of treatment:	You were admitted with volume overload concerning for acute on chronic heart failure, which improved with Bipap, diuresis (water pills), and dialysis.  Secondary Diagnosis:	Anemia  Goal:	treat  Assessment and plan of treatment:	Please continue to go to dialysis.  Nephrology recommended erythropoietin injections to increase your red blood cell mass.  Please take your medications as prescribed and follow up with nephrology and primary care provider within one to two weeks as discharged.  Secondary Diagnosis:	Bipolar affective disorder  Goal:	Treat  Assessment and plan of treatment:	Please take your medications as prescribed and follow up with your primary care physician and psychiatrist within 1-2 weeks of discharge.  Secondary Diagnosis:	Coronary artery disease  Goal:	Treat  Assessment and plan of treatment:	Please take your medications as prescribed and follow up with your primary care physician within 1-2 weeks of discharge.  Secondary Diagnosis:	Diabetes mellitus  Goal:	Treat  Assessment and plan of treatment:	Please take your medications as prescribed and follow up with your primary care physician within 1-2 weeks of discharge.  Secondary Diagnosis:	ESRD on dialysis  Goal:	treat  Assessment and plan of treatment:	Please attend dialysis as regularly scheduled.  Secondary Diagnosis:	COPD (chronic obstructive pulmonary disease)  Goal:	treat  Assessment and plan of treatment:	Please take your medications as prescribed and follow up with your primary care physician within 1-2 weeks of discharge. Principal Discharge DX:	Acute on chronic systolic (congestive) heart failure  Goal:	treat  Assessment and plan of treatment:	You were admitted with volume overload concerning for acute on chronic heart failure, which improved with Bipap, diuresis (water pills), and dialysis.  Please follow up with your primary care physician, cardiologist, and nephrologist within 1-2 weeks of discharge.  Secondary Diagnosis:	Anemia  Goal:	treat  Assessment and plan of treatment:	Please continue to go to dialysis.  Nephrology recommended erythropoietin injections to increase your red blood cell mass.  Please take your medications as prescribed and follow up with nephrology and primary care provider within one to two weeks as discharged.  Secondary Diagnosis:	Bipolar affective disorder  Goal:	Treat  Assessment and plan of treatment:	Please take your medications as prescribed and follow up with your primary care physician and psychiatrist within 1-2 weeks of discharge.  Secondary Diagnosis:	Coronary artery disease  Goal:	Treat  Assessment and plan of treatment:	Please take your medications as prescribed and follow up with your primary care physician within 1-2 weeks of discharge.  Secondary Diagnosis:	Diabetes mellitus  Goal:	Treat  Assessment and plan of treatment:	Please take your medications as prescribed and follow up with your primary care physician within 1-2 weeks of discharge.  Secondary Diagnosis:	ESRD on dialysis  Goal:	treat  Assessment and plan of treatment:	Please attend dialysis as regularly scheduled.  Secondary Diagnosis:	COPD (chronic obstructive pulmonary disease)  Goal:	treat  Assessment and plan of treatment:	Please take your medications as prescribed and follow up with your primary care physician within 1-2 weeks of discharge.

## 2018-09-05 NOTE — DISCHARGE NOTE ADULT - HOSPITAL COURSE
Pt admitted from [facility]; concern for CHF exacerbation.  Pt received Lasix and dialysis was initiated.  Pt began producing green sputum and received 6 days of Zosyn to treat possible pneumonia, the likely precipitant of ADHF.  Pt had no significant changes in medication.  Pt will require further evaluation for his capacity to swallow; pt will need family member present to assist with barium swallow study.

## 2018-09-05 NOTE — PROGRESS NOTE ADULT - SUBJECTIVE AND OBJECTIVE BOX
[facility] Division of Kidney Diseases & Hypertension  HEMODIALYSIS NOTE  --------------------------------------------------------------------------------  Chief Complaint: ESRD/Ongoing hemodialysis requirement    24 hour events/subjective: Patient was seen and evaluated this morning prior to dialysis.  He was less verbal than his typical.          PAST HISTORY  --------------------------------------------------------------------------------  No significant changes to PMH, PSH, FHx, SHx, unless otherwise noted    ALLERGIES & MEDICATIONS  --------------------------------------------------------------------------------  Allergies    No Known Allergies    Intolerances      Standing Inpatient Medications  acetaminophen  IVPB. 1000 milliGRAM(s) IV Intermittent once  ALBUTerol    90 MICROgram(s) HFA Inhaler 2 Puff(s) Inhalation every 6 hours  amiodarone    Tablet 200 milliGRAM(s) Oral at bedtime  aspirin  chewable 81 milliGRAM(s) Oral daily  atorvastatin 40 milliGRAM(s) Oral at bedtime  busPIRone 5 milliGRAM(s) Oral two times a day  clopidogrel Tablet 75 milliGRAM(s) Oral daily  dextrose 5%. 1000 milliLiter(s) IV Continuous <Continuous>  dextrose 50% Injectable 12.5 Gram(s) IV Push once  dextrose 50% Injectable 25 Gram(s) IV Push once  dextrose 50% Injectable 25 Gram(s) IV Push once  epoetin georgie Injectable 2000 Unit(s) IV Push <User Schedule>  heparin  Injectable 5000 Unit(s) SubCutaneous every 8 hours  insulin lispro (HumaLOG) corrective regimen sliding scale   SubCutaneous three times a day before meals  insulin lispro (HumaLOG) corrective regimen sliding scale   SubCutaneous at bedtime  metoprolol tartrate 25 milliGRAM(s) Oral two times a day  multivitamin 1 Tablet(s) Oral daily  piperacillin/tazobactam IVPB. 3.375 Gram(s) IV Intermittent every 12 hours  tamsulosin 0.4 milliGRAM(s) Oral at bedtime  tiotropium 18 MICROgram(s) Capsule 1 Capsule(s) Inhalation daily    PRN Inpatient Medications  acetaminophen   Tablet .. 650 milliGRAM(s) Oral every 6 hours PRN  dextrose 40% Gel 15 Gram(s) Oral once PRN  glucagon  Injectable 1 milliGRAM(s) IntraMuscular once PRN  melatonin 3 milliGRAM(s) Oral at bedtime PRN      REVIEW OF SYSTEMS  --------------------------------------------------------------------------------    unable to obtain due to lethargy and poor mental status.    VITALS/PHYSICAL EXAM  --------------------------------------------------------------------------------  T(C): 36.8 (09-05-18 @ 11:34), Max: 37.3 (09-05-18 @ 00:30)  HR: 77 (09-05-18 @ 11:34) (69 - 87)  BP: 127/66 (09-05-18 @ 11:34) (101/56 - 127/66)  RR: 18 (09-05-18 @ 11:34) (17 - 18)  SpO2: 97% (09-05-18 @ 07:29) (96% - 100%)  Wt(kg): --        09-04-18 @ 07:01  -  09-05-18 @ 07:00  --------------------------------------------------------  IN: 546 mL / OUT: 0 mL / NET: 546 mL    09-05-18 @ 07:01  -  09-05-18 @ 14:31  --------------------------------------------------------  IN: 500 mL / OUT: 2201 mL / NET: -1701 mL      Physical Exam:                Gen: Elderly male, on room air  	HEENT: anicteric  	Pulm: Rales present diffusely today  	CV:  S1S2 rrr  	Abd: +BS, soft   	Ext: No B/L Lower ext edema  	Neuro: awake alert responsive  	Skin: Warm, without rashes  	Vascular access: Right UE AVF present; thrill and bruit heard.    LABS/STUDIES  --------------------------------------------------------------------------------              8.4    6.42  >-----------<  237      [09-05-18 @ 05:50]              27.5     142  |  98  |  59  ----------------------------<  90      [09-05-18 @ 05:50]  4.1   |  25  |  5.66        Ca     9.1     [09-05-18 @ 05:50]      Mg     2.2     [09-05-18 @ 05:50]      Phos  4.6     [09-05-18 @ 05:50]    TPro  6.5  /  Alb  3.2  /  TBili  0.3  /  DBili  x   /  AST  16  /  ALT  13  /  AlkPhos  79  [09-05-18 @ 05:50]    PT/INR: PT 12.3 , INR 1.11       [09-04-18 @ 06:12]  PTT: 33.9       [09-04-18 @ 06:12]      TSH 8.78      [09-04-18 @ 06:12]  Lipid: chol 106, TG 97, HDL 33, LDL 54      [09-04-18 @ 06:12]    HBsAg NEGATIVE      [09-03-18 @ 10:16]

## 2018-09-05 NOTE — DISCHARGE NOTE ADULT - COMMUNITY RESOURCES
Que for LTC with onsite HD @ [facility] address: [id number] [place] [address] tel # [phone]

## 2018-09-05 NOTE — DISCHARGE NOTE ADULT - PLAN OF CARE
treat You were admitted with volume overload concerning for acute on chronic heart failure, which improved with Bipap, diuresis (water pills), and dialysis. Please continue to go to dialysis.  Nephrology recommended erythropoietin injections to increase your red blood cell mass.  Please take your medications as prescribed and follow up with nephrology and primary care provider within one to two weeks as discharged. Treat Please take your medications as prescribed and follow up with your primary care physician and psychiatrist within 1-2 weeks of discharge. Please take your medications as prescribed and follow up with your primary care physician within 1-2 weeks of discharge. Please attend dialysis as regularly scheduled. You were admitted with volume overload concerning for acute on chronic heart failure, which improved with Bipap, diuresis (water pills), and dialysis.  Please follow up with your primary care physician, cardiologist, and nephrologist within 1-2 weeks of discharge.

## 2018-09-05 NOTE — DISCHARGE NOTE ADULT - MEDICATION SUMMARY - MEDICATIONS TO TAKE
I will START or STAY ON the medications listed below when I get home from the hospital:    acetaminophen 325 mg oral tablet  -- 2 tab(s) by mouth every 6 hours, As needed, Severe Pain (7 - 10)  -- Indication: For Pain    aspirin 81 mg oral tablet, chewable  -- 1 tab(s) by mouth once a day  -- Indication: For CAD    tamsulosin 0.4 mg oral capsule  -- 1 cap(s) by mouth once a day (at bedtime)  -- Indication: For BPH (benign prostatic hyperplasia)    amiodarone 200 mg oral tablet  -- 1 tab(s) by mouth once a day  -- Indication: For Arrhythmia    atorvastatin 40 mg oral tablet  -- 1 tab(s) by mouth once a day (at bedtime)  -- Indication: For HLD (hyperlipidemia)    clopidogrel 75 mg oral tablet  -- 1 tab(s) by mouth once a day  -- Indication: For CAD    busPIRone 5 mg oral tablet  -- 1 tab(s) by mouth 2 times a day  -- Indication: For Depression    metoprolol tartrate 25 mg oral tablet  -- 1 tab(s) by mouth 2 times a day  -- Indication: For CHF (congestive heart failure)    ipratropium-albuterol 0.5 mg-2.5 mg/3 mLinhalation solution  -- 3 milliliter(s) inhaled every 6 hours, As needed, Shortness of Breath and/or Wheezing  -- Indication: For COPD (chronic obstructive pulmonary disease)    tiotropium 18 mcg inhalation capsule  -- 1 cap(s) inhaled once a day  -- Indication: For COPD (chronic obstructive pulmonary disease)    albuterol 90 mcg/inh inhalation aerosol  -- 2 puff(s) inhaled every 6 hours  -- Indication: For COPD (chronic obstructive pulmonary disease)    epoetin georgie  -- 2000 unit(s) injectable during dialysis  -- Indication: For Anemia    melatonin 3 mg oral tablet  -- 1 tab(s) by mouth once a day (at bedtime), As needed, Insomnia  -- Indication: For insomnia    Multiple Vitamins oral tablet  -- 1 tab(s) by mouth once a day  -- Indication: For nutrition

## 2018-09-05 NOTE — DISCHARGE NOTE ADULT - CARE PROVIDER_API CALL
Sam Salcido), Internal Medicine  [address] [address] [place] [address]  Phone: [phone]  Fax: [phone]

## 2018-09-05 NOTE — PROGRESS NOTE ADULT - ASSESSMENT
73M h/o ESRD (Dialysis M/W/F), Bipolar, BPH, COPD, HTN, CAD presents from [facility] for [facility] and desaturation likely 2/2 acute decompensated heart failure vs acute pulmonary edema from underlying ESRD vs infectious etiology. Now symptomatically improved following BiPAP and Lasix IVP 40mg x1. Now with cough productive of greenish sputum. Empirically started on zosyn for aspiration pneumonia.

## 2018-09-05 NOTE — PROGRESS NOTE ADULT - SUBJECTIVE AND OBJECTIVE BOX
Ramiro Perkins PGY1, Pager #[phone]    Patient is a 73y old  Male who presents with a chief complaint of CHF exacerbation (04 Sep 2018 12:59)      O/N: No acute events overnight.    REVIEW OF SYSTEMS:  CONSTITUTIONAL: No fever  NECK: No pain or stiffness  RESPIRATORY: No cough or shortness of breath  CARDIOVASCULAR: No chest pain  GASTROINTESTINAL: No abdominal or epigastric pain, No melena or hematochezia.  GENITOURINARY: No dysuria  NEUROLOGICAL: No headaches  SKIN: No rashes or lesions     MEDICATIONS  (STANDING):  acetaminophen  IVPB. 1000 milliGRAM(s) IV Intermittent once  ALBUTerol    90 MICROgram(s) HFA Inhaler 2 Puff(s) Inhalation every 6 hours  amiodarone    Tablet 200 milliGRAM(s) Oral at bedtime  aspirin  chewable 81 milliGRAM(s) Oral daily  atorvastatin 40 milliGRAM(s) Oral at bedtime  busPIRone 5 milliGRAM(s) Oral two times a day  clopidogrel Tablet 75 milliGRAM(s) Oral daily  dextrose 5%. 1000 milliLiter(s) (50 mL/Hr) IV Continuous <Continuous>  dextrose 50% Injectable 12.5 Gram(s) IV Push once  dextrose 50% Injectable 25 Gram(s) IV Push once  dextrose 50% Injectable 25 Gram(s) IV Push once  epoetin georgie Injectable 2000 Unit(s) IV Push <User Schedule>  heparin  Injectable 5000 Unit(s) SubCutaneous every 8 hours  insulin lispro (HumaLOG) corrective regimen sliding scale   SubCutaneous three times a day before meals  insulin lispro (HumaLOG) corrective regimen sliding scale   SubCutaneous at bedtime  metoprolol tartrate 25 milliGRAM(s) Oral two times a day  multivitamin 1 Tablet(s) Oral daily  piperacillin/tazobactam IVPB. 3.375 Gram(s) IV Intermittent every 12 hours  tamsulosin 0.4 milliGRAM(s) Oral at bedtime  tiotropium 18 MICROgram(s) Capsule 1 Capsule(s) Inhalation daily    MEDICATIONS  (PRN):  acetaminophen   Tablet .. 650 milliGRAM(s) Oral every 6 hours PRN Temp greater or equal to 38C (100.4F), Mild Pain (1 - 3), Moderate Pain (4 - 6)  dextrose 40% Gel 15 Gram(s) Oral once PRN Blood Glucose LESS THAN 70 milliGRAM(s)/deciliter  glucagon  Injectable 1 milliGRAM(s) IntraMuscular once PRN Glucose LESS THAN 70 milligrams/deciliter  melatonin 3 milliGRAM(s) Oral at bedtime PRN Insomnia        Objective:    Vitals: Vital Signs Last 24 Hrs  T(C): 37.1 (09-05-18 @ 05:00), Max: 37.3 (09-05-18 @ 00:30)  T(F): 98.8 (09-05-18 @ 05:00), Max: 99.1 (09-05-18 @ 00:30)  HR: 84 (09-05-18 @ 05:00) (67 - 84)  BP: 118/60 (09-05-18 @ 05:00) (100/54 - 118/60)  BP(mean): --  RR: 18 (09-05-18 @ 05:00) (17 - 18)  SpO2: 96% (09-05-18 @ 05:00) (96% - 100%)            I&O's Summary    03 Sep 2018 07:01  -  04 Sep 2018 07:00  --------------------------------------------------------  IN: 1044 mL / OUT: 2510 mL / NET: -1466 mL    04 Sep 2018 07:01  -  05 Sep 2018 06:43  --------------------------------------------------------  IN: 546 mL / OUT: 0 mL / NET: 546 mL        PHYSICAL EXAM:  GENERAL: NAD  HEAD:  Atraumatic, Normocephalic  CHEST/LUNG: Clear to auscultation bilaterally; No rales, rhonchi, or wheezing  HEART: Regular rate and rhythm; No murmurs, rubs, or gallops  ABDOMEN: Soft, nontender, nondistended  EXTREMITIES:  No edema  LYMPH: No lymphadenopathy noted  SKIN: No rashes or lesions  NERVOUS SYSTEM:  Alert & Oriented X2    LABS:  09-04    142  |  98  |  42<H>  ----------------------------<  106<H>  4.0   |  28  |  4.15<H>  09-03    143  |  99  |  74<H>  ----------------------------<  228<H>  4.8   |  23  |  5.91<H>    Ca    9.1      04 Sep 2018 06:12  Ca    9.3      03 Sep 2018 05:11  Phos  4.4     09-04  Mg     2.1     09-04    TPro  6.4  /  Alb  3.3  /  TBili  0.3  /  DBili  x   /  AST  15  /  ALT  15  /  AlkPhos  81  09-04  TPro  6.8  /  Alb  3.5  /  TBili  0.3  /  DBili  x   /  AST  14  /  ALT  16  /  AlkPhos  90  09-03    PT/INR - ( 04 Sep 2018 06:12 )   PT: 12.3 SEC;   INR: 1.11          PTT - ( 04 Sep 2018 06:12 )  PTT:33.9 SEC                ABG - ( 03 Sep 2018 17:54 )  pH, Arterial: 7.51  pH, Blood: x     /  pCO2: 36    /  pO2: 104   / HCO3: 29    / Base Excess: 5.1   /  SaO2: 97.6                   7.9    6.49  )-----------( 228      ( 04 Sep 2018 06:12 )             26.2                         8.3    6.16  )-----------( 248      ( 03 Sep 2018 12:21 )             26.9                         9.6    9.03  )-----------( 241      ( 03 Sep 2018 05:11 )             32.0     CAPILLARY BLOOD GLUCOSE      POCT Blood Glucose.: 113 mg/dL (04 Sep 2018 22:34)  POCT Blood Glucose.: 140 mg/dL (04 Sep 2018 17:01)  POCT Blood Glucose.: 125 mg/dL (04 Sep 2018 12:07)  POCT Blood Glucose.: 118 mg/dL (04 Sep 2018 07:45)    RADIOLOGY:  Imaging Personally Reviewed: yes      Consultants involved in case: yes  Consultant(s) Notes Reviewed:  yes  Care Discussed with Consultants/Other Providers       Ramiro Perkins, PGY-1

## 2018-09-05 NOTE — PROGRESS NOTE ADULT - SUBJECTIVE AND OBJECTIVE BOX
Reid Martinez MD  Interventional Cardiology / Advance Heart Failure and Cardiac Transplant Specialist  [place] Office : [phone] [place] [address]  Tel:   [place] Office : [address] [facility] [place] [address]  Tel: [phone]  Cell : [phone]    Subjective : Pt lying in bed comfortable, not in distress, denies any chest pain or SOB  	  MEDICATIONS:  amiodarone    Tablet 200 milliGRAM(s) Oral at bedtime  aspirin  chewable 81 milliGRAM(s) Oral daily  clopidogrel Tablet 75 milliGRAM(s) Oral daily  heparin  Injectable 5000 Unit(s) SubCutaneous every 8 hours  metoprolol tartrate 25 milliGRAM(s) Oral two times a day  tamsulosin 0.4 milliGRAM(s) Oral at bedtime    piperacillin/tazobactam IVPB. 3.375 Gram(s) IV Intermittent every 12 hours    ALBUTerol    90 MICROgram(s) HFA Inhaler 2 Puff(s) Inhalation every 6 hours  tiotropium 18 MICROgram(s) Capsule 1 Capsule(s) Inhalation daily    acetaminophen   Tablet .. 650 milliGRAM(s) Oral every 6 hours PRN  acetaminophen  IVPB. 1000 milliGRAM(s) IV Intermittent once  busPIRone 5 milliGRAM(s) Oral two times a day  melatonin 3 milliGRAM(s) Oral at bedtime PRN      atorvastatin 40 milliGRAM(s) Oral at bedtime  dextrose 40% Gel 15 Gram(s) Oral once PRN  dextrose 50% Injectable 12.5 Gram(s) IV Push once  dextrose 50% Injectable 25 Gram(s) IV Push once  dextrose 50% Injectable 25 Gram(s) IV Push once  glucagon  Injectable 1 milliGRAM(s) IntraMuscular once PRN  insulin lispro (HumaLOG) corrective regimen sliding scale   SubCutaneous three times a day before meals  insulin lispro (HumaLOG) corrective regimen sliding scale   SubCutaneous at bedtime    dextrose 5%. 1000 milliLiter(s) IV Continuous <Continuous>  epoetin georgie Injectable 2000 Unit(s) IV Push <User Schedule>  multivitamin 1 Tablet(s) Oral daily      PHYSICAL EXAM:  T(C): 36.8 (09-05-18 @ 11:34), Max: 37.3 (09-05-18 @ 00:30)  HR: 77 (09-05-18 @ 11:34) (69 - 87)  BP: 127/66 (09-05-18 @ 11:34) (101/56 - 127/66)  RR: 18 (09-05-18 @ 11:34) (17 - 18)  SpO2: 97% (09-05-18 @ 07:29) (96% - 100%)  Wt(kg): --  I&O's Summary    04 Sep 2018 07:01  -  05 Sep 2018 07:00  --------------------------------------------------------  IN: 546 mL / OUT: 0 mL / NET: 546 mL    05 Sep 2018 07:01  -  05 Sep 2018 12:03  --------------------------------------------------------  IN: 500 mL / OUT: 2201 mL / NET: -1701 mL          Appearance: Normal	  HEENT:   Normal oral mucosa, PERRL, EOMI	  Cardiovascular: Normal S1 S2, No JVD, No murmurs, No edema  Respiratory: mild bibasilar crackles  Gastrointestinal:  Soft, Non-tender, + BS	  Extremities: Normal range of motion, No clubbing, cyanosis or edema                                    8.4    6.42  )-----------( 237      ( 05 Sep 2018 05:50 )             27.5     09-05    142  |  98  |  59<H>  ----------------------------<  90  4.1   |  25  |  5.66<H>    Ca    9.1      05 Sep 2018 05:50  Phos  4.6     09-05  Mg     2.2     09-05    TPro  6.5  /  Alb  3.2<L>  /  TBili  0.3  /  DBili  x   /  AST  16  /  ALT  13  /  AlkPhos  79  09-05    proBNP:   Lipid Profile:   HgA1c:   TSH:

## 2018-09-05 NOTE — PROGRESS NOTE ADULT - PROBLEM SELECTOR PLAN 1
Initially presented with worsening SOB. Likely 2/2 CHF exacerbation vs fluid overload from ESRD vs PNA; now improved following BiPAP and Lasix 40mg IVP x1. Vitals wnl  - f/u TTE  - daily weights  - strict I&Os  - diabetic-renal-DASH diet w/ fluid restriction  - goal net negative fluid balance  - now with productive cough; started on zosyn (day 2)

## 2018-09-05 NOTE — SWALLOW BEDSIDE ASSESSMENT ADULT - ASR SWALLOW ASPIRATION MONITOR
pneumonia/oral hygiene/position upright (90Y)/gurgly voice/change of breathing pattern/cough/fever/throat clearing/upper respiratory infection

## 2018-09-05 NOTE — DISCHARGE NOTE ADULT - ADDITIONAL INSTRUCTIONS
Please take your medications as prescribed.  Please go to dialysis as scheduled.  Please follow up with your primary care physician, cardiologist, nephrologist, and psychiatrist within 1-2 weeks of discharge.

## 2018-09-05 NOTE — DISCHARGE NOTE ADULT - SECONDARY DIAGNOSIS.
Anemia Bipolar affective disorder Coronary artery disease Diabetes mellitus ESRD on dialysis COPD (chronic obstructive pulmonary disease)

## 2018-09-05 NOTE — PROGRESS NOTE ADULT - PROBLEM SELECTOR PLAN 2
The patient has anemia likely of chronic disease.  Monitor complete blood count.  Hgb below goal but with past history of stroke.  Will start 2000U epogen for now and monitor.

## 2018-09-05 NOTE — PROGRESS NOTE ADULT - PROBLEM SELECTOR PLAN 2
Hx of CKD, on dialysis M/W/F, acutely dyspneic at rehab, possibly 2/2 cardiorenal in setting of ADHF  -Dialysis today  - f/u renal recs  - strict I/O, daily weight, avoid nephrotoxin, hold ACEi/ARB/RCA, diabetic-DASH-renal diet w/ fluid restriction

## 2018-09-05 NOTE — PROGRESS NOTE ADULT - PROBLEM SELECTOR PLAN 1
Patient had emergent HD for volume overload.  Patient had maintenance HD today which he had to end early as his BP was low with pain in his knee.  He had 2.5 hours will re-evaluate for volume overload.

## 2018-09-05 NOTE — SWALLOW BEDSIDE ASSESSMENT ADULT - SWALLOW EVAL: DIAGNOSIS
Patient demonstrates oropharyngeal dysphagia characterized by mildly delayed bolus collection, manipulation and transfer of puree and honey-thick liquids. The pharyngeal stage is characterized by a suspected delay in pharyngeal trigger with reduced hyolaryngeal excursion upon digital palpation. No immediate overt s/s of laryngeal penetration/aspiration noted, however patient exhibited delayed coughing post trials suggesting compromised airway protection. No additional trials presented. Given above clinical presentation, concerning chest imaging, and history of dysphagia, oral nutrition/hydration/medication deemed contraindicated at this time. Objective testing is warranted to r/o aspiration and determine candidacy for safe initiation of PO intake.

## 2018-09-06 DIAGNOSIS — I50.9 HEART FAILURE, UNSPECIFIED: ICD-10-CM

## 2018-09-06 LAB
ALBUMIN SERPL ELPH-MCNC: 3.3 G/DL — SIGNIFICANT CHANGE UP (ref 3.3–5)
ALP SERPL-CCNC: 80 U/L — SIGNIFICANT CHANGE UP (ref 40–120)
ALT FLD-CCNC: 11 U/L — SIGNIFICANT CHANGE UP (ref 4–41)
AST SERPL-CCNC: 13 U/L — SIGNIFICANT CHANGE UP (ref 4–40)
BASOPHILS # BLD AUTO: 0.03 K/UL — SIGNIFICANT CHANGE UP (ref 0–0.2)
BASOPHILS NFR BLD AUTO: 0.7 % — SIGNIFICANT CHANGE UP (ref 0–2)
BILIRUB SERPL-MCNC: 0.3 MG/DL — SIGNIFICANT CHANGE UP (ref 0.2–1.2)
BUN SERPL-MCNC: 31 MG/DL — HIGH (ref 7–23)
CALCIUM SERPL-MCNC: 9 MG/DL — SIGNIFICANT CHANGE UP (ref 8.4–10.5)
CHLORIDE SERPL-SCNC: 95 MMOL/L — LOW (ref 98–107)
CO2 SERPL-SCNC: 25 MMOL/L — SIGNIFICANT CHANGE UP (ref 22–31)
CREAT SERPL-MCNC: 4.14 MG/DL — HIGH (ref 0.5–1.3)
EOSINOPHIL # BLD AUTO: 0.11 K/UL — SIGNIFICANT CHANGE UP (ref 0–0.5)
EOSINOPHIL NFR BLD AUTO: 2.5 % — SIGNIFICANT CHANGE UP (ref 0–6)
GLUCOSE SERPL-MCNC: 68 MG/DL — LOW (ref 70–99)
HCT VFR BLD CALC: 28.3 % — LOW (ref 39–50)
HGB BLD-MCNC: 8.5 G/DL — LOW (ref 13–17)
IMM GRANULOCYTES # BLD AUTO: 0.01 # — SIGNIFICANT CHANGE UP
IMM GRANULOCYTES NFR BLD AUTO: 0.2 % — SIGNIFICANT CHANGE UP (ref 0–1.5)
LYMPHOCYTES # BLD AUTO: 1.56 K/UL — SIGNIFICANT CHANGE UP (ref 1–3.3)
LYMPHOCYTES # BLD AUTO: 35.7 % — SIGNIFICANT CHANGE UP (ref 13–44)
MAGNESIUM SERPL-MCNC: 2.2 MG/DL — SIGNIFICANT CHANGE UP (ref 1.6–2.6)
MCHC RBC-ENTMCNC: 28.1 PG — SIGNIFICANT CHANGE UP (ref 27–34)
MCHC RBC-ENTMCNC: 30 % — LOW (ref 32–36)
MCV RBC AUTO: 93.7 FL — SIGNIFICANT CHANGE UP (ref 80–100)
MONOCYTES # BLD AUTO: 0.44 K/UL — SIGNIFICANT CHANGE UP (ref 0–0.9)
MONOCYTES NFR BLD AUTO: 10.1 % — SIGNIFICANT CHANGE UP (ref 2–14)
NEUTROPHILS # BLD AUTO: 2.22 K/UL — SIGNIFICANT CHANGE UP (ref 1.8–7.4)
NEUTROPHILS NFR BLD AUTO: 50.8 % — SIGNIFICANT CHANGE UP (ref 43–77)
NRBC # FLD: 0 — SIGNIFICANT CHANGE UP
PHOSPHATE SERPL-MCNC: 4.4 MG/DL — SIGNIFICANT CHANGE UP (ref 2.5–4.5)
PLATELET # BLD AUTO: 227 K/UL — SIGNIFICANT CHANGE UP (ref 150–400)
PMV BLD: 9.5 FL — SIGNIFICANT CHANGE UP (ref 7–13)
POTASSIUM SERPL-MCNC: 3.6 MMOL/L — SIGNIFICANT CHANGE UP (ref 3.5–5.3)
POTASSIUM SERPL-SCNC: 3.6 MMOL/L — SIGNIFICANT CHANGE UP (ref 3.5–5.3)
PROT SERPL-MCNC: 6.7 G/DL — SIGNIFICANT CHANGE UP (ref 6–8.3)
RBC # BLD: 3.02 M/UL — LOW (ref 4.2–5.8)
RBC # FLD: 14.2 % — SIGNIFICANT CHANGE UP (ref 10.3–14.5)
SODIUM SERPL-SCNC: 140 MMOL/L — SIGNIFICANT CHANGE UP (ref 135–145)
WBC # BLD: 4.37 K/UL — SIGNIFICANT CHANGE UP (ref 3.8–10.5)
WBC # FLD AUTO: 4.37 K/UL — SIGNIFICANT CHANGE UP (ref 3.8–10.5)

## 2018-09-06 PROCEDURE — 74230 X-RAY XM SWLNG FUNCJ C+: CPT | Mod: 26

## 2018-09-06 RX ADMIN — ALBUTEROL 2 PUFF(S): 90 AEROSOL, METERED ORAL at 21:05

## 2018-09-06 RX ADMIN — Medication 25 MILLIGRAM(S): at 17:16

## 2018-09-06 RX ADMIN — ALBUTEROL 2 PUFF(S): 90 AEROSOL, METERED ORAL at 10:12

## 2018-09-06 RX ADMIN — ATORVASTATIN CALCIUM 40 MILLIGRAM(S): 80 TABLET, FILM COATED ORAL at 21:05

## 2018-09-06 RX ADMIN — Medication 5 MILLIGRAM(S): at 17:16

## 2018-09-06 RX ADMIN — Medication 5 MILLIGRAM(S): at 06:09

## 2018-09-06 RX ADMIN — AMIODARONE HYDROCHLORIDE 200 MILLIGRAM(S): 400 TABLET ORAL at 21:05

## 2018-09-06 RX ADMIN — Medication 25 MILLIGRAM(S): at 06:09

## 2018-09-06 RX ADMIN — TIOTROPIUM BROMIDE 1 CAPSULE(S): 18 CAPSULE ORAL; RESPIRATORY (INHALATION) at 10:12

## 2018-09-06 RX ADMIN — TAMSULOSIN HYDROCHLORIDE 0.4 MILLIGRAM(S): 0.4 CAPSULE ORAL at 21:05

## 2018-09-06 RX ADMIN — CLOPIDOGREL BISULFATE 75 MILLIGRAM(S): 75 TABLET, FILM COATED ORAL at 11:58

## 2018-09-06 RX ADMIN — Medication 81 MILLIGRAM(S): at 11:58

## 2018-09-06 RX ADMIN — PIPERACILLIN AND TAZOBACTAM 25 GRAM(S): 4; .5 INJECTION, POWDER, LYOPHILIZED, FOR SOLUTION INTRAVENOUS at 17:15

## 2018-09-06 RX ADMIN — PIPERACILLIN AND TAZOBACTAM 25 GRAM(S): 4; .5 INJECTION, POWDER, LYOPHILIZED, FOR SOLUTION INTRAVENOUS at 06:10

## 2018-09-06 RX ADMIN — HEPARIN SODIUM 5000 UNIT(S): 5000 INJECTION INTRAVENOUS; SUBCUTANEOUS at 13:56

## 2018-09-06 RX ADMIN — HEPARIN SODIUM 5000 UNIT(S): 5000 INJECTION INTRAVENOUS; SUBCUTANEOUS at 21:05

## 2018-09-06 RX ADMIN — HEPARIN SODIUM 5000 UNIT(S): 5000 INJECTION INTRAVENOUS; SUBCUTANEOUS at 06:10

## 2018-09-06 RX ADMIN — Medication 1 TABLET(S): at 11:58

## 2018-09-06 RX ADMIN — ALBUTEROL 2 PUFF(S): 90 AEROSOL, METERED ORAL at 16:23

## 2018-09-06 NOTE — PROGRESS NOTE ADULT - PROBLEM SELECTOR PLAN 5
- no urinary retention at this time  - c/w Flomax - no active symptoms at this time  - c/w home psych med buspirone

## 2018-09-06 NOTE — SWALLOW VFSS/MBS ASSESSMENT ADULT - DIAGNOSTIC IMPRESSIONS
1. The patient demonstrated a mild oral dysphagia for two PO trials of honey thick liquid textures marked by delayed bolus collection, transfer, and AP transit. Mild anterior loss observed from the oral cavity.   2. The patient demonstrated a mild pharyngeal dysphagia for two PO trials of honey thick liquid textures marked by a delayed pharyngeal swallow trigger with adequate base of tongue retraction, adequate epiglottic deflection, reduced hyolaryngeal elevation, and adequate pharyngeal contractility. Trace stasis noted along the base of tongue and in the vallecula. No laryngeal penetration/aspiration was observed.   *Additional PO trials of puree and nectar thick liquids were presented, at which time the patient maintained a tight labial seal and pushed this clinician's hand away despite maximum encouragement to participate in the evaluation.

## 2018-09-06 NOTE — PROGRESS NOTE ADULT - ASSESSMENT
73M h/o ESRD (Dialysis M/W/F), Bipolar, BPH, COPD, HTN, CAD presents from [facility] for [facility] and desaturation likely 2/2 acute decompensated heart failure vs acute pulmonary edema from underlying ESRD vs infectious etiology. Now symptomatically improved following BiPAP and Lasix IVP 40mg x1. Now with cough productive of greenish sputum. Empirically started on zosyn for aspiration pneumonia. 73M h/o ESRD (Dialysis M/W/F), Bipolar, BPH, COPD, HTN, CAD presents from [place] for [facility] and desaturation likely 2/2 acute decompensated heart failure vs acute pulmonary edema from underlying ESRD vs infectious etiology. Now symptomatically improved following BiPAP and Lasix IVP 40mg x1. Concerns for aspiration pneumonia given cough productive of greenish sputum. Empirically started on zosyn. Patient has remained afebrile w/o leukocytosis. Vitals stable

## 2018-09-06 NOTE — PROGRESS NOTE ADULT - PROBLEM SELECTOR PLAN 4
- no active symptoms at this time  - c/w home psych med buspirone - s/p BABAR x5 in Sept 2017  -Troponemia to 500s on this admission 2/2 ESRD and volume overloaded status  - c/w ASA + Plavix + statin  - h/o Afib (not on AC given hx of GI bleeding)

## 2018-09-06 NOTE — PROGRESS NOTE ADULT - PROBLEM SELECTOR PLAN 7
- fall + aspiration precautions - likely 2/2 CKD  - trend CBC  - maintain active T&S; hgb > 8 given cardiac hx  - if transfusion necessary, will give half unit over three hours followed by diuresis with another half unit  - f/u renal recs

## 2018-09-06 NOTE — CHART NOTE - NSCHARTNOTEFT_GEN_A_CORE
Patient evaluated earlier today.  resting comfortably no need for additional UF today.  maintenance hd tomorrow.

## 2018-09-06 NOTE — PROGRESS NOTE ADULT - SUBJECTIVE AND OBJECTIVE BOX
Ramiro Perkins PGY-1   [facility] Pager # [phone]  NS Pager # [phone]      Patient is a 73y old  Male who presents with a chief complaint of CHF exacerbation (05 Sep 2018 16:49)      SUBJECTIVE: No acute events overnight. Patient seen and examined at bedside.    REVIEW OF SYSTEMS:  CONSTITUTIONAL: No fever, weight loss, or fatigue  RESPIRATORY: No cough or shortness of breath  CARDIOVASCULAR: No chest pain, palpitations, dizziness, or leg swelling  GASTROINTESTINAL: No abdominal or epigastric pain. No nausea, vomiting, or hematemesis; No diarrhea or constipation. No melena or hematochezia.  GENITOURINARY: No dysuria  NEUROLOGICAL: No headaches  SKIN: No itching or lesions       MEDICATIONS  (STANDING):  acetaminophen  IVPB. 1000 milliGRAM(s) IV Intermittent once  ALBUTerol    90 MICROgram(s) HFA Inhaler 2 Puff(s) Inhalation every 6 hours  amiodarone    Tablet 200 milliGRAM(s) Oral at bedtime  aspirin  chewable 81 milliGRAM(s) Oral daily  atorvastatin 40 milliGRAM(s) Oral at bedtime  busPIRone 5 milliGRAM(s) Oral two times a day  clopidogrel Tablet 75 milliGRAM(s) Oral daily  dextrose 5%. 1000 milliLiter(s) (50 mL/Hr) IV Continuous <Continuous>  dextrose 50% Injectable 12.5 Gram(s) IV Push once  dextrose 50% Injectable 25 Gram(s) IV Push once  dextrose 50% Injectable 25 Gram(s) IV Push once  epoetin georgie Injectable 2000 Unit(s) IV Push <User Schedule>  heparin  Injectable 5000 Unit(s) SubCutaneous every 8 hours  insulin lispro (HumaLOG) corrective regimen sliding scale   SubCutaneous three times a day before meals  insulin lispro (HumaLOG) corrective regimen sliding scale   SubCutaneous at bedtime  metoprolol tartrate 25 milliGRAM(s) Oral two times a day  multivitamin 1 Tablet(s) Oral daily  piperacillin/tazobactam IVPB. 3.375 Gram(s) IV Intermittent every 12 hours  tamsulosin 0.4 milliGRAM(s) Oral at bedtime  tiotropium 18 MICROgram(s) Capsule 1 Capsule(s) Inhalation daily    MEDICATIONS  (PRN):  acetaminophen   Tablet .. 650 milliGRAM(s) Oral every 6 hours PRN Temp greater or equal to 38C (100.4F), Mild Pain (1 - 3), Moderate Pain (4 - 6)  dextrose 40% Gel 15 Gram(s) Oral once PRN Blood Glucose LESS THAN 70 milliGRAM(s)/deciliter  glucagon  Injectable 1 milliGRAM(s) IntraMuscular once PRN Glucose LESS THAN 70 milligrams/deciliter  melatonin 3 milliGRAM(s) Oral at bedtime PRN Insomnia        Objective:    Vitals: Vital Signs Last 24 Hrs  T(C): 37.1 (09-06-18 @ 05:27), Max: 37.6 (09-05-18 @ 15:16)  T(F): 98.7 (09-06-18 @ 05:27), Max: 99.6 (09-05-18 @ 15:16)  HR: 72 (09-06-18 @ 07:09) (67 - 87)  BP: 121/61 (09-06-18 @ 05:27) (114/63 - 136/68)  BP(mean): --  RR: 18 (09-06-18 @ 05:27) (17 - 18)  SpO2: 95% (09-06-18 @ 07:09) (95% - 100%)            I&O's Summary    05 Sep 2018 07:01  -  06 Sep 2018 07:00  --------------------------------------------------------  IN: 500 mL / OUT: 2201 mL / NET: -1701 mL        PHYSICAL EXAM:  GENERAL: NAD  HEAD:  Atraumatic, Normocephalic  CHEST/LUNG: Clear to auscultation bilaterally; No rales or wheezing  HEART: Regular rate and rhythm; No murmurs, rubs, or gallops  ABDOMEN: Soft, nontender, nondistended  EXTREMITIES:  No clubbing, cyanosis, or edema  LYMPH: No lymphadenopathy noted  SKIN: No rashes or lesions  NERVOUS SYSTEM:  alert and oriented x2    LABS:  09-06    140  |  95<L>  |  31<H>  ----------------------------<  68<L>  3.6   |  25  |  4.14<H>  09-05    142  |  98  |  59<H>  ----------------------------<  90  4.1   |  25  |  5.66<H>  09-04    142  |  98  |  42<H>  ----------------------------<  106<H>  4.0   |  28  |  4.15<H>    Ca    9.0      06 Sep 2018 05:38  Ca    9.1      05 Sep 2018 05:50  Ca    9.1      04 Sep 2018 06:12  Phos  4.4     09-06  Mg     2.2     09-06    TPro  6.7  /  Alb  3.3  /  TBili  0.3  /  DBili  x   /  AST  13  /  ALT  11  /  AlkPhos  80  09-06  TPro  6.5  /  Alb  3.2<L>  /  TBili  0.3  /  DBili  x   /  AST  16  /  ALT  13  /  AlkPhos  79  09-05  TPro  6.4  /  Alb  3.3  /  TBili  0.3  /  DBili  x   /  AST  15  /  ALT  15  /  AlkPhos  81  09-04                                            8.5    4.37  )-----------( 227      ( 06 Sep 2018 05:38 )             28.3                         8.4    6.42  )-----------( 237      ( 05 Sep 2018 05:50 )             27.5                         7.9    6.49  )-----------( 228      ( 04 Sep 2018 06:12 )             26.2     CAPILLARY BLOOD GLUCOSE      POCT Blood Glucose.: 79 mg/dL (06 Sep 2018 03:26)  POCT Blood Glucose.: 85 mg/dL (05 Sep 2018 22:22)  POCT Blood Glucose.: 87 mg/dL (05 Sep 2018 11:51)  POCT Blood Glucose.: 94 mg/dL (05 Sep 2018 10:34)  POCT Blood Glucose.: 101 mg/dL (05 Sep 2018 07:53)    RADIOLOGY:  Imaging Personally Reviewed: YES      Consultants involved in case: YES  Consultant(s) Notes Reviewed:  YES  Care Discussed with Consultants/Other Providers       Ramiro Perkins, PGY-1 Ramiro Perkins PGY-1   [facility] Pager # [phone]  NS Pager # [phone]      Patient is a 73y old  Male who presents with a chief complaint of CHF exacerbation (05 Sep 2018 16:49)      SUBJECTIVE: No acute events overnight. Patient seen and examined at bedside. Patient denying any SOB or CP.     REVIEW OF SYSTEMS:  CONSTITUTIONAL: No fever, weight loss, or fatigue  RESPIRATORY: No cough or shortness of breath  CARDIOVASCULAR: No chest pain, palpitations, dizziness, or leg swelling  GASTROINTESTINAL: No abdominal or epigastric pain. No nausea, vomiting, or hematemesis; No diarrhea or constipation. No melena or hematochezia.  GENITOURINARY: No dysuria  NEUROLOGICAL: No headaches  SKIN: No itching or lesions       MEDICATIONS  (STANDING):  acetaminophen  IVPB. 1000 milliGRAM(s) IV Intermittent once  ALBUTerol    90 MICROgram(s) HFA Inhaler 2 Puff(s) Inhalation every 6 hours  amiodarone    Tablet 200 milliGRAM(s) Oral at bedtime  aspirin  chewable 81 milliGRAM(s) Oral daily  atorvastatin 40 milliGRAM(s) Oral at bedtime  busPIRone 5 milliGRAM(s) Oral two times a day  clopidogrel Tablet 75 milliGRAM(s) Oral daily  dextrose 5%. 1000 milliLiter(s) (50 mL/Hr) IV Continuous <Continuous>  dextrose 50% Injectable 12.5 Gram(s) IV Push once  dextrose 50% Injectable 25 Gram(s) IV Push once  dextrose 50% Injectable 25 Gram(s) IV Push once  epoetin georgie Injectable 2000 Unit(s) IV Push <User Schedule>  heparin  Injectable 5000 Unit(s) SubCutaneous every 8 hours  insulin lispro (HumaLOG) corrective regimen sliding scale   SubCutaneous three times a day before meals  insulin lispro (HumaLOG) corrective regimen sliding scale   SubCutaneous at bedtime  metoprolol tartrate 25 milliGRAM(s) Oral two times a day  multivitamin 1 Tablet(s) Oral daily  piperacillin/tazobactam IVPB. 3.375 Gram(s) IV Intermittent every 12 hours  tamsulosin 0.4 milliGRAM(s) Oral at bedtime  tiotropium 18 MICROgram(s) Capsule 1 Capsule(s) Inhalation daily    MEDICATIONS  (PRN):  acetaminophen   Tablet .. 650 milliGRAM(s) Oral every 6 hours PRN Temp greater or equal to 38C (100.4F), Mild Pain (1 - 3), Moderate Pain (4 - 6)  dextrose 40% Gel 15 Gram(s) Oral once PRN Blood Glucose LESS THAN 70 milliGRAM(s)/deciliter  glucagon  Injectable 1 milliGRAM(s) IntraMuscular once PRN Glucose LESS THAN 70 milligrams/deciliter  melatonin 3 milliGRAM(s) Oral at bedtime PRN Insomnia        Objective:    Vitals: Vital Signs Last 24 Hrs  T(C): 37.1 (09-06-18 @ 05:27), Max: 37.6 (09-05-18 @ 15:16)  T(F): 98.7 (09-06-18 @ 05:27), Max: 99.6 (09-05-18 @ 15:16)  HR: 72 (09-06-18 @ 07:09) (67 - 87)  BP: 121/61 (09-06-18 @ 05:27) (114/63 - 136/68)  BP(mean): --  RR: 18 (09-06-18 @ 05:27) (17 - 18)  SpO2: 95% (09-06-18 @ 07:09) (95% - 100%)            I&O's Summary    05 Sep 2018 07:01  -  06 Sep 2018 07:00  --------------------------------------------------------  IN: 500 mL / OUT: 2201 mL / NET: -1701 mL        PHYSICAL EXAM:  GENERAL: NAD  HEAD:  Atraumatic, Normocephalic  CHEST/LUNG: Clear to auscultation bilaterally; No rales or wheezing  HEART: Regular rate and rhythm; No murmurs, rubs, or gallops  ABDOMEN: Soft, nontender, nondistended  EXTREMITIES:  No clubbing, cyanosis, or edema  LYMPH: No lymphadenopathy noted  SKIN: No rashes or lesions  NERVOUS SYSTEM:  alert and oriented x2    LABS:  09-06    140  |  95<L>  |  31<H>  ----------------------------<  68<L>  3.6   |  25  |  4.14<H>  09-05    142  |  98  |  59<H>  ----------------------------<  90  4.1   |  25  |  5.66<H>  09-04    142  |  98  |  42<H>  ----------------------------<  106<H>  4.0   |  28  |  4.15<H>    Ca    9.0      06 Sep 2018 05:38  Ca    9.1      05 Sep 2018 05:50  Ca    9.1      04 Sep 2018 06:12  Phos  4.4     09-06  Mg     2.2     09-06    TPro  6.7  /  Alb  3.3  /  TBili  0.3  /  DBili  x   /  AST  13  /  ALT  11  /  AlkPhos  80  09-06  TPro  6.5  /  Alb  3.2<L>  /  TBili  0.3  /  DBili  x   /  AST  16  /  ALT  13  /  AlkPhos  79  09-05  TPro  6.4  /  Alb  3.3  /  TBili  0.3  /  DBili  x   /  AST  15  /  ALT  15  /  AlkPhos  81  09-04                                            8.5    4.37  )-----------( 227      ( 06 Sep 2018 05:38 )             28.3                         8.4    6.42  )-----------( 237      ( 05 Sep 2018 05:50 )             27.5                         7.9    6.49  )-----------( 228      ( 04 Sep 2018 06:12 )             26.2     CAPILLARY BLOOD GLUCOSE      POCT Blood Glucose.: 79 mg/dL (06 Sep 2018 03:26)  POCT Blood Glucose.: 85 mg/dL (05 Sep 2018 22:22)  POCT Blood Glucose.: 87 mg/dL (05 Sep 2018 11:51)  POCT Blood Glucose.: 94 mg/dL (05 Sep 2018 10:34)  POCT Blood Glucose.: 101 mg/dL (05 Sep 2018 07:53)    RADIOLOGY:  Imaging Personally Reviewed: YES      Consultants involved in case: YES  Consultant(s) Notes Reviewed:  YES  Care Discussed with Consultants/Other Providers       Ramiro Perkins, PGY-1

## 2018-09-06 NOTE — PROGRESS NOTE ADULT - ASSESSMENT
a/p     1) Acute on chronic systolic CHF - clinically improving , continue volume removal with HD, recent echo shows mod LV dysfunction  mild troponin elevation likely sec to ESRD and CHF. cont with BB . no acei or arb due to borderline low bp    2) CAD s/p PCI - on ASA and plavix ,     3) ESRD on HD    4)  Afib - h/o GI bleed and anemia off coumadin, on ASA and pxytoe30 mL

## 2018-09-06 NOTE — SWALLOW VFSS/MBS ASSESSMENT ADULT - RECOMMENDED CONSISTENCY
1. A PO diet cannot be recommended at this time given the limited amount of trials the patient accepted during today's evaluation.   2. Discuss the plan of care regarding the nutritional goals of care with the patient and his caregivers.   3. Strict oral hygiene and strict aspiration precautions

## 2018-09-06 NOTE — PROGRESS NOTE ADULT - PROBLEM SELECTOR PLAN 2
Hx of CKD, on dialysis M/W/F, acutely dyspneic at rehab, possibly 2/2 cardiorenal in setting of ADHF  -Dialysis today  - f/u renal recs  - strict I/O, daily weight, avoid nephrotoxin, hold ACEi/ARB/RCA, diabetic-DASH-renal diet w/ fluid restriction Hx of CKD, on dialysis M/W/F, acutely dyspneic at rehab, possibly 2/2 fluid overload in setting of ADHF  -Dialysis tomorrow; Dialysis session ended early yesterday due to hypotension  - f/u renal recs  - strict I/O, daily weight, avoid nephrotoxin, hold ACEi/ARB/RCA, diabetic-DASH-renal diet w/ fluid restriction

## 2018-09-06 NOTE — PROGRESS NOTE ADULT - SUBJECTIVE AND OBJECTIVE BOX
Reid Martinez MD  Interventional Cardiology  [place] Office : [address] [place] [address] [address]  Tel:   [place] Office : [address] [facility] [place] [address]  Tel: [phone]  Cell : [phone]    Subjective : Pt lying in bed comfortable, not in distress, denies any chest pain or SOB  	  MEDICATIONS:  amiodarone    Tablet 200 milliGRAM(s) Oral at bedtime  aspirin  chewable 81 milliGRAM(s) Oral daily  clopidogrel Tablet 75 milliGRAM(s) Oral daily  heparin  Injectable 5000 Unit(s) SubCutaneous every 8 hours  metoprolol tartrate 25 milliGRAM(s) Oral two times a day  tamsulosin 0.4 milliGRAM(s) Oral at bedtime    piperacillin/tazobactam IVPB. 3.375 Gram(s) IV Intermittent every 12 hours    ALBUTerol    90 MICROgram(s) HFA Inhaler 2 Puff(s) Inhalation every 6 hours  tiotropium 18 MICROgram(s) Capsule 1 Capsule(s) Inhalation daily    acetaminophen   Tablet .. 650 milliGRAM(s) Oral every 6 hours PRN  acetaminophen  IVPB. 1000 milliGRAM(s) IV Intermittent once  busPIRone 5 milliGRAM(s) Oral two times a day  melatonin 3 milliGRAM(s) Oral at bedtime PRN      atorvastatin 40 milliGRAM(s) Oral at bedtime  dextrose 40% Gel 15 Gram(s) Oral once PRN  dextrose 50% Injectable 12.5 Gram(s) IV Push once  dextrose 50% Injectable 25 Gram(s) IV Push once  dextrose 50% Injectable 25 Gram(s) IV Push once  glucagon  Injectable 1 milliGRAM(s) IntraMuscular once PRN  insulin lispro (HumaLOG) corrective regimen sliding scale   SubCutaneous three times a day before meals  insulin lispro (HumaLOG) corrective regimen sliding scale   SubCutaneous at bedtime    dextrose 5%. 1000 milliLiter(s) IV Continuous <Continuous>  epoetin georgie Injectable 2000 Unit(s) IV Push <User Schedule>  multivitamin 1 Tablet(s) Oral daily      PHYSICAL EXAM:  T(C): 36.7 (09-06-18 @ 17:16), Max: 37.1 (09-06-18 @ 05:27)  HR: 66 (09-06-18 @ 17:16) (66 - 84)  BP: 115/61 (09-06-18 @ 17:16) (115/57 - 128/63)  RR: 17 (09-06-18 @ 17:16) (17 - 18)  SpO2: 98% (09-06-18 @ 17:16) (95% - 100%)  Wt(kg): --  I&O's Summary    05 Sep 2018 07:01  -  06 Sep 2018 07:00  --------------------------------------------------------  IN: 500 mL / OUT: 2201 mL / NET: -17EKG - NSR no significant STT changes  Echo - 6/18- EF moderately decreased              Appearance: Normal	  HEENT:   Normal oral mucosa, PERRL, EOMI	  Cardiovascular: Normal S1 S2, No JVD, No murmurs, No edema  Respiratory: mild bibasilar crackles  Gastrointestinal:  Soft, Non-tender, + BS	  Extremities: Normal range of motion, No clubbing, cyanosis or edema                                      8.5    4.37  )-----------( 227      ( 06 Sep 2018 05:38 )             28.3     09-06    140  |  95<L>  |  31<H>  ----------------------------<  68<L>  3.6   |  25  |  4.14<H>    Ca    9.0      06 Sep 2018 05:38  Phos  4.4     09-06  Mg     2.2     09-06    TPro  6.7  /  Alb  3.3  /  TBili  0.3  /  DBili  x   /  AST  13  /  ALT  11  /  AlkPhos  80  09-06    proBNP:   Lipid Profile:   HgA1c:   TSH:

## 2018-09-06 NOTE — PROGRESS NOTE ADULT - PROBLEM SELECTOR PLAN 1
Initially presented with worsening SOB. Likely 2/2 CHF exacerbation vs fluid overload from ESRD vs PNA; now improved following BiPAP and Lasix 40mg IVP x1. Vitals wnl  - f/u TTE  - daily weights  - strict I&Os  - diabetic-renal-DASH diet w/ fluid restriction  - goal net negative fluid balance  - now with productive cough; started on zosyn (day 2) Initially presented with worsening SOB. Likely 2/2 CHF exacerbation vs fluid overload from ESRD vs PNA; now improved following BiPAP and Lasix 40mg IVP x1. - afebrile, no leukocytosis, and vitals wnl  - developed productive cough during this admission  - CXR neg for focal consolidation  - BCx NGTD  - c/w zosyn for possible aspiration pneumonia (day 3)  - failed speech and swallow eval  - now NPO for aspiration risk  - Nutrition consult placed

## 2018-09-06 NOTE — PROGRESS NOTE ADULT - PROBLEM SELECTOR PLAN 6
- likely 2/2 CKD  - trend CBC  - maintain active T&S; hgb > 8 given cardiac hx  - if transfusion necessary, will give half unit over three hours followed by diuresis with another half unit  - f/u renal recs - no urinary retention at this time  - c/w Flomax

## 2018-09-06 NOTE — PROGRESS NOTE ADULT - PROBLEM SELECTOR PLAN 3
- s/p BABAR x5 in Sept 2017  -Troponemia to 500s on this admission 2/2 ESRD and volume overloaded status  - c/w ASA + Plavix + statin - f/u TTE  - appears euvolemic at this time  - daily weights  - strict I&Os  - diabetic-renal-DASH diet w/ fluid restriction  - goal net negative fluid balance  - c/w tele

## 2018-09-06 NOTE — SWALLOW VFSS/MBS ASSESSMENT ADULT - COMMENTS
The patient was received in the radiology suite this AM, at which time he was alert, though refusing PO trials despite maximum verbal/visual/tactile prompting by this clinician. The patient is known to this department as he was seen for a clinical bedside swallow evaluation on 9/5/18 (please see full report for details), at which time oral nutrition/hydration was contraindicated and a cinesophagram was recommended. The patient had a previous cinesophagram on 7/2/18 (please see full report for details), at which time a dysphagia 1 with honey thick liquid diet was recommended.     Per charting, the patient is a 74 y/o [place]-speaking M with PMHx significant of CAD(s/p numerous stents; most recently BABAR x5 placed in Sept 2017), ESRD (on HD M/W/F), Bipolar disorder, COPD, BPH, CVA, DM, HLD, HTN, Afib(off coumadin 2/2 to bleed last admission), and CHF(with EF of 20%). Patient admitted for likely HF exacerbation. Most recent CXR revealed, "Improving patchy and reticular opacities bilaterally. Heart size is normal. Degenerative changes of the spine." Discussed results and recommendations from this evaluation with Team 2.

## 2018-09-07 LAB
ALBUMIN SERPL ELPH-MCNC: 3.3 G/DL — SIGNIFICANT CHANGE UP (ref 3.3–5)
ALP SERPL-CCNC: 75 U/L — SIGNIFICANT CHANGE UP (ref 40–120)
ALT FLD-CCNC: 10 U/L — SIGNIFICANT CHANGE UP (ref 4–41)
AST SERPL-CCNC: 12 U/L — SIGNIFICANT CHANGE UP (ref 4–40)
BASOPHILS # BLD AUTO: 0.01 K/UL — SIGNIFICANT CHANGE UP (ref 0–0.2)
BASOPHILS NFR BLD AUTO: 0.2 % — SIGNIFICANT CHANGE UP (ref 0–2)
BILIRUB SERPL-MCNC: 0.3 MG/DL — SIGNIFICANT CHANGE UP (ref 0.2–1.2)
BUN SERPL-MCNC: 43 MG/DL — HIGH (ref 7–23)
CALCIUM SERPL-MCNC: 9 MG/DL — SIGNIFICANT CHANGE UP (ref 8.4–10.5)
CHLORIDE SERPL-SCNC: 97 MMOL/L — LOW (ref 98–107)
CO2 SERPL-SCNC: 27 MMOL/L — SIGNIFICANT CHANGE UP (ref 22–31)
CREAT SERPL-MCNC: 5.61 MG/DL — HIGH (ref 0.5–1.3)
EOSINOPHIL # BLD AUTO: 0.22 K/UL — SIGNIFICANT CHANGE UP (ref 0–0.5)
EOSINOPHIL NFR BLD AUTO: 5.3 % — SIGNIFICANT CHANGE UP (ref 0–6)
GLUCOSE SERPL-MCNC: 110 MG/DL — HIGH (ref 70–99)
HCT VFR BLD CALC: 25.8 % — LOW (ref 39–50)
HGB BLD-MCNC: 7.9 G/DL — LOW (ref 13–17)
IMM GRANULOCYTES # BLD AUTO: 0.01 # — SIGNIFICANT CHANGE UP
IMM GRANULOCYTES NFR BLD AUTO: 0.2 % — SIGNIFICANT CHANGE UP (ref 0–1.5)
LYMPHOCYTES # BLD AUTO: 1.57 K/UL — SIGNIFICANT CHANGE UP (ref 1–3.3)
LYMPHOCYTES # BLD AUTO: 38.1 % — SIGNIFICANT CHANGE UP (ref 13–44)
MAGNESIUM SERPL-MCNC: 2.3 MG/DL — SIGNIFICANT CHANGE UP (ref 1.6–2.6)
MCHC RBC-ENTMCNC: 28.3 PG — SIGNIFICANT CHANGE UP (ref 27–34)
MCHC RBC-ENTMCNC: 30.6 % — LOW (ref 32–36)
MCV RBC AUTO: 92.5 FL — SIGNIFICANT CHANGE UP (ref 80–100)
MONOCYTES # BLD AUTO: 0.39 K/UL — SIGNIFICANT CHANGE UP (ref 0–0.9)
MONOCYTES NFR BLD AUTO: 9.5 % — SIGNIFICANT CHANGE UP (ref 2–14)
NEUTROPHILS # BLD AUTO: 1.92 K/UL — SIGNIFICANT CHANGE UP (ref 1.8–7.4)
NEUTROPHILS NFR BLD AUTO: 46.7 % — SIGNIFICANT CHANGE UP (ref 43–77)
NRBC # FLD: 0 — SIGNIFICANT CHANGE UP
PHOSPHATE SERPL-MCNC: 5.6 MG/DL — HIGH (ref 2.5–4.5)
PLATELET # BLD AUTO: 236 K/UL — SIGNIFICANT CHANGE UP (ref 150–400)
PMV BLD: 9.9 FL — SIGNIFICANT CHANGE UP (ref 7–13)
POTASSIUM SERPL-MCNC: 3.2 MMOL/L — LOW (ref 3.5–5.3)
POTASSIUM SERPL-SCNC: 3.2 MMOL/L — LOW (ref 3.5–5.3)
PROT SERPL-MCNC: 6.3 G/DL — SIGNIFICANT CHANGE UP (ref 6–8.3)
RBC # BLD: 2.79 M/UL — LOW (ref 4.2–5.8)
RBC # FLD: 14.2 % — SIGNIFICANT CHANGE UP (ref 10.3–14.5)
SODIUM SERPL-SCNC: 141 MMOL/L — SIGNIFICANT CHANGE UP (ref 135–145)
WBC # BLD: 4.12 K/UL — SIGNIFICANT CHANGE UP (ref 3.8–10.5)
WBC # FLD AUTO: 4.12 K/UL — SIGNIFICANT CHANGE UP (ref 3.8–10.5)

## 2018-09-07 PROCEDURE — 90935 HEMODIALYSIS ONE EVALUATION: CPT

## 2018-09-07 RX ADMIN — CLOPIDOGREL BISULFATE 75 MILLIGRAM(S): 75 TABLET, FILM COATED ORAL at 12:24

## 2018-09-07 RX ADMIN — Medication 5 MILLIGRAM(S): at 17:23

## 2018-09-07 RX ADMIN — HEPARIN SODIUM 5000 UNIT(S): 5000 INJECTION INTRAVENOUS; SUBCUTANEOUS at 05:53

## 2018-09-07 RX ADMIN — ALBUTEROL 2 PUFF(S): 90 AEROSOL, METERED ORAL at 17:23

## 2018-09-07 RX ADMIN — TAMSULOSIN HYDROCHLORIDE 0.4 MILLIGRAM(S): 0.4 CAPSULE ORAL at 21:50

## 2018-09-07 RX ADMIN — TIOTROPIUM BROMIDE 1 CAPSULE(S): 18 CAPSULE ORAL; RESPIRATORY (INHALATION) at 10:23

## 2018-09-07 RX ADMIN — Medication 5 MILLIGRAM(S): at 05:53

## 2018-09-07 RX ADMIN — ALBUTEROL 2 PUFF(S): 90 AEROSOL, METERED ORAL at 21:50

## 2018-09-07 RX ADMIN — Medication 1 TABLET(S): at 12:24

## 2018-09-07 RX ADMIN — Medication 81 MILLIGRAM(S): at 12:24

## 2018-09-07 RX ADMIN — ATORVASTATIN CALCIUM 40 MILLIGRAM(S): 80 TABLET, FILM COATED ORAL at 21:50

## 2018-09-07 RX ADMIN — HEPARIN SODIUM 5000 UNIT(S): 5000 INJECTION INTRAVENOUS; SUBCUTANEOUS at 21:50

## 2018-09-07 RX ADMIN — Medication 650 MILLIGRAM(S): at 21:06

## 2018-09-07 RX ADMIN — ERYTHROPOIETIN 2000 UNIT(S): 10000 INJECTION, SOLUTION INTRAVENOUS; SUBCUTANEOUS at 08:25

## 2018-09-07 RX ADMIN — Medication 650 MILLIGRAM(S): at 22:00

## 2018-09-07 RX ADMIN — AMIODARONE HYDROCHLORIDE 200 MILLIGRAM(S): 400 TABLET ORAL at 21:50

## 2018-09-07 RX ADMIN — ALBUTEROL 2 PUFF(S): 90 AEROSOL, METERED ORAL at 10:23

## 2018-09-07 RX ADMIN — PIPERACILLIN AND TAZOBACTAM 25 GRAM(S): 4; .5 INJECTION, POWDER, LYOPHILIZED, FOR SOLUTION INTRAVENOUS at 05:54

## 2018-09-07 RX ADMIN — PIPERACILLIN AND TAZOBACTAM 25 GRAM(S): 4; .5 INJECTION, POWDER, LYOPHILIZED, FOR SOLUTION INTRAVENOUS at 17:23

## 2018-09-07 RX ADMIN — ALBUTEROL 2 PUFF(S): 90 AEROSOL, METERED ORAL at 05:53

## 2018-09-07 NOTE — PROGRESS NOTE ADULT - PROBLEM SELECTOR PLAN 2
The patient has anemia likely of chronic disease.  Monitor complete blood count.  Hgb below goal but with past history of stroke.  Will continue 2000U epogen for now and monitor.

## 2018-09-07 NOTE — PROGRESS NOTE ADULT - SUBJECTIVE AND OBJECTIVE BOX
Subjective:    Patient is a 73y old  Male who presents with a chief complaint of CHF exacerbation (06 Sep 2018 18:26)    Overnight events:    MEDICATIONS  (STANDING):  acetaminophen  IVPB. 1000 milliGRAM(s) IV Intermittent once  ALBUTerol    90 MICROgram(s) HFA Inhaler 2 Puff(s) Inhalation every 6 hours  amiodarone    Tablet 200 milliGRAM(s) Oral at bedtime  aspirin  chewable 81 milliGRAM(s) Oral daily  atorvastatin 40 milliGRAM(s) Oral at bedtime  busPIRone 5 milliGRAM(s) Oral two times a day  clopidogrel Tablet 75 milliGRAM(s) Oral daily  dextrose 5%. 1000 milliLiter(s) (50 mL/Hr) IV Continuous <Continuous>  dextrose 50% Injectable 12.5 Gram(s) IV Push once  dextrose 50% Injectable 25 Gram(s) IV Push once  dextrose 50% Injectable 25 Gram(s) IV Push once  epoetin georgie Injectable 2000 Unit(s) IV Push <User Schedule>  heparin  Injectable 5000 Unit(s) SubCutaneous every 8 hours  insulin lispro (HumaLOG) corrective regimen sliding scale   SubCutaneous three times a day before meals  insulin lispro (HumaLOG) corrective regimen sliding scale   SubCutaneous at bedtime  metoprolol tartrate 25 milliGRAM(s) Oral two times a day  multivitamin 1 Tablet(s) Oral daily  piperacillin/tazobactam IVPB. 3.375 Gram(s) IV Intermittent every 12 hours  tamsulosin 0.4 milliGRAM(s) Oral at bedtime  tiotropium 18 MICROgram(s) Capsule 1 Capsule(s) Inhalation daily    MEDICATIONS  (PRN):  acetaminophen   Tablet .. 650 milliGRAM(s) Oral every 6 hours PRN Temp greater or equal to 38C (100.4F), Mild Pain (1 - 3), Moderate Pain (4 - 6)  dextrose 40% Gel 15 Gram(s) Oral once PRN Blood Glucose LESS THAN 70 milliGRAM(s)/deciliter  glucagon  Injectable 1 milliGRAM(s) IntraMuscular once PRN Glucose LESS THAN 70 milligrams/deciliter  melatonin 3 milliGRAM(s) Oral at bedtime PRN Insomnia    Objective:    Vitals: Vital Signs Last 24 Hrs  T(C): 37.2 (09-07-18 @ 05:17), Max: 37.2 (09-07-18 @ 01:30)  T(F): 99 (09-07-18 @ 05:17), Max: 99 (09-07-18 @ 01:30)  HR: 66 (09-07-18 @ 05:17) (66 - 87)  BP: 113/52 (09-07-18 @ 05:17) (113/52 - 131/64)  RR: 17 (09-07-18 @ 05:17) (17 - 18)  SpO2: 100% (09-07-18 @ 05:17) (95% - 100%)              I&O's Summary    05 Sep 2018 07:01  -  06 Sep 2018 07:00  --------------------------------------------------------  IN: 500 mL / OUT: 2201 mL / NET: -1701 mL    PHYSICAL EXAM:  GENERAL: NAD, well-groomed, well-developed  HEAD:  Atraumatic, Normocephalic  EYES: EOMI, PERRLA, conjunctiva and sclera clear  ENMT: No tonsillar erythema, exudates, or enlargement; Moist mucous membranes, Good dentition, No lesions  NECK: Supple, No JVD, Normal thyroid  CHEST/LUNG: Clear to percussion bilaterally; No rales, rhonchi, wheezing, or rubs  HEART: Regular rate and rhythm; No murmurs, rubs, or gallops  ABDOMEN: Soft, Nontender, Nondistended; Bowel sounds present  EXTREMITIES:  2+ Peripheral Pulses, No clubbing, cyanosis, or edema  LYMPH: No lymphadenopathy noted  SKIN: No rashes or lesions  NERVOUS SYSTEM:  Alert & Oriented X3, Good concentration; Motor Strength 5/5 B/L upper and lower extremities; DTRs 2+ intact and symmetric                                             LABS:  09-06    140  |  95<L>  |  31<H>  ----------------------------<  68<L>  3.6   |  25  |  4.14<H>  09-05    142  |  98  |  59<H>  ----------------------------<  90  4.1   |  25  |  5.66<H>  09-04    142  |  98  |  42<H>  ----------------------------<  106<H>  4.0   |  28  |  4.15<H>    Ca    9.0      06 Sep 2018 05:38  Ca    9.1      05 Sep 2018 05:50  Ca    9.1      04 Sep 2018 06:12  Phos  4.4     09-06  Mg     2.2     09-06    TPro  6.7  /  Alb  3.3  /  TBili  0.3  /  DBili  x   /  AST  13  /  ALT  11  /  AlkPhos  80  09-06  TPro  6.5  /  Alb  3.2<L>  /  TBili  0.3  /  DBili  x   /  AST  16  /  ALT  13  /  AlkPhos  79  09-05  TPro  6.4  /  Alb  3.3  /  TBili  0.3  /  DBili  x   /  AST  15  /  ALT  15  /  AlkPhos  81  09-04                        8.5    4.37  )-----------( 227      ( 06 Sep 2018 05:38 )             28.3                         8.4    6.42  )-----------( 237      ( 05 Sep 2018 05:50 )             27.5                         7.9    6.49  )-----------( 228      ( 04 Sep 2018 06:12 )             26.2     CAPILLARY BLOOD GLUCOSE      POCT Blood Glucose.: 160 mg/dL (06 Sep 2018 22:14)  POCT Blood Glucose.: 116 mg/dL (06 Sep 2018 17:03)  POCT Blood Glucose.: 78 mg/dL (06 Sep 2018 11:49)  POCT Blood Glucose.: 76 mg/dL (06 Sep 2018 07:33) Ramiro Perkins PGY-1   [facility] Pager # [phone]  NS Pager # [phone]      Patient is a 73y old  Male who presents with a chief complaint of CHF exacerbation (07 Sep 2018 05:50)      SUBJECTIVE: No acute events overnight. Patient seen and examined at bedside.    REVIEW OF SYSTEMS:  CONSTITUTIONAL: No fever, weight loss, or fatigue  RESPIRATORY: No cough or shortness of breath  CARDIOVASCULAR: No chest pain, palpitations, dizziness, or leg swelling  GASTROINTESTINAL: No abdominal or epigastric pain. No nausea, vomiting, or hematemesis; No diarrhea or constipation. No melena or hematochezia.  GENITOURINARY: No dysuria  NEUROLOGICAL: No headaches  SKIN: No itching or lesions       MEDICATIONS  (STANDING):  acetaminophen  IVPB. 1000 milliGRAM(s) IV Intermittent once  ALBUTerol    90 MICROgram(s) HFA Inhaler 2 Puff(s) Inhalation every 6 hours  amiodarone    Tablet 200 milliGRAM(s) Oral at bedtime  aspirin  chewable 81 milliGRAM(s) Oral daily  atorvastatin 40 milliGRAM(s) Oral at bedtime  busPIRone 5 milliGRAM(s) Oral two times a day  clopidogrel Tablet 75 milliGRAM(s) Oral daily  dextrose 5%. 1000 milliLiter(s) (50 mL/Hr) IV Continuous <Continuous>  dextrose 50% Injectable 12.5 Gram(s) IV Push once  dextrose 50% Injectable 25 Gram(s) IV Push once  dextrose 50% Injectable 25 Gram(s) IV Push once  epoetin georgie Injectable 2000 Unit(s) IV Push <User Schedule>  heparin  Injectable 5000 Unit(s) SubCutaneous every 8 hours  insulin lispro (HumaLOG) corrective regimen sliding scale   SubCutaneous three times a day before meals  insulin lispro (HumaLOG) corrective regimen sliding scale   SubCutaneous at bedtime  metoprolol tartrate 25 milliGRAM(s) Oral two times a day  multivitamin 1 Tablet(s) Oral daily  piperacillin/tazobactam IVPB. 3.375 Gram(s) IV Intermittent every 12 hours  tamsulosin 0.4 milliGRAM(s) Oral at bedtime  tiotropium 18 MICROgram(s) Capsule 1 Capsule(s) Inhalation daily    MEDICATIONS  (PRN):  acetaminophen   Tablet .. 650 milliGRAM(s) Oral every 6 hours PRN Temp greater or equal to 38C (100.4F), Mild Pain (1 - 3), Moderate Pain (4 - 6)  dextrose 40% Gel 15 Gram(s) Oral once PRN Blood Glucose LESS THAN 70 milliGRAM(s)/deciliter  glucagon  Injectable 1 milliGRAM(s) IntraMuscular once PRN Glucose LESS THAN 70 milligrams/deciliter  melatonin 3 milliGRAM(s) Oral at bedtime PRN Insomnia        Objective:    Vitals: Vital Signs Last 24 Hrs  T(C): 37.2 (09-07-18 @ 05:17), Max: 37.2 (09-07-18 @ 01:30)  T(F): 99 (09-07-18 @ 05:17), Max: 99 (09-07-18 @ 01:30)  HR: 66 (09-07-18 @ 05:17) (66 - 87)  BP: 113/52 (09-07-18 @ 05:17) (113/52 - 131/64)  BP(mean): --  RR: 17 (09-07-18 @ 05:17) (17 - 18)  SpO2: 100% (09-07-18 @ 05:17) (95% - 100%)            I&O's Summary    05 Sep 2018 07:01  -  06 Sep 2018 07:00  --------------------------------------------------------  IN: 500 mL / OUT: 2201 mL / NET: -1701 mL        PHYSICAL EXAM:  GENERAL: NAD  HEAD:  Atraumatic, Normocephalic  CHEST/LUNG: Clear to auscultation bilaterally; No rales or wheezing  HEART: Regular rate and rhythm; No murmurs, rubs, or gallops  ABDOMEN: Soft, nontender, nondistended  EXTREMITIES:  No clubbing, cyanosis, or edema  LYMPH: No lymphadenopathy noted  SKIN: No rashes or lesions  NERVOUS SYSTEM:  Alert & Oriented X3    LABS:  09-06    140  |  95<L>  |  31<H>  ----------------------------<  68<L>  3.6   |  25  |  4.14<H>  09-05    142  |  98  |  59<H>  ----------------------------<  90  4.1   |  25  |  5.66<H>    Ca    9.0      06 Sep 2018 05:38  Ca    9.1      05 Sep 2018 05:50  Phos  4.4     09-06  Mg     2.2     09-06    TPro  6.7  /  Alb  3.3  /  TBili  0.3  /  DBili  x   /  AST  13  /  ALT  11  /  AlkPhos  80  09-06  TPro  6.5  /  Alb  3.2<L>  /  TBili  0.3  /  DBili  x   /  AST  16  /  ALT  13  /  AlkPhos  79  09-05                                            8.5    4.37  )-----------( 227      ( 06 Sep 2018 05:38 )             28.3                         8.4    6.42  )-----------( 237      ( 05 Sep 2018 05:50 )             27.5     CAPILLARY BLOOD GLUCOSE      POCT Blood Glucose.: 160 mg/dL (06 Sep 2018 22:14)  POCT Blood Glucose.: 116 mg/dL (06 Sep 2018 17:03)  POCT Blood Glucose.: 78 mg/dL (06 Sep 2018 11:49)  POCT Blood Glucose.: 76 mg/dL (06 Sep 2018 07:33)    RADIOLOGY:  Imaging Personally Reviewed: YES      Consultants involved in case: YES  Consultant(s) Notes Reviewed:  YES  Care Discussed with Consultants/Other Providers       Ramiro Perkins, PGY-1

## 2018-09-07 NOTE — PROGRESS NOTE ADULT - ASSESSMENT
73M h/o ESRD (Dialysis M/W/F), Bipolar, BPH, COPD, HTN, CAD presents from [place] for [facility] and desaturation likely 2/2 acute decompensated heart failure vs acute pulmonary edema from underlying ESRD vs infectious etiology. Now symptomatically improved following BiPAP and Lasix IVP 40mg x1. Concerns for aspiration pneumonia given cough productive of greenish sputum. Empirically started on zosyn. Patient has remained afebrile w/o leukocytosis. Vitals stable

## 2018-09-07 NOTE — PROGRESS NOTE ADULT - PROBLEM SELECTOR PLAN 2
Hx of CKD, on dialysis M/W/F, acutely dyspneic at rehab, possibly 2/2 fluid overload in setting of ADHF  -Dialysis tomorrow; Dialysis session ended early yesterday due to hypotension  - f/u renal recs  - strict I/O, daily weight, avoid nephrotoxin, hold ACEi/ARB/RCA, diabetic-DASH-renal diet w/ fluid restriction Hx of CKD, on dialysis M/W/F, acutely dyspneic at rehab, possibly 2/2 fluid overload in setting of ADHF  -Dialysis today  - f/u renal recs  - strict I/O, daily weight, avoid nephrotoxin, hold ACEi/ARB/RCA, diabetic-DASH-renal diet w/ fluid restriction

## 2018-09-07 NOTE — PROGRESS NOTE ADULT - PROBLEM SELECTOR PLAN 3
- f/u TTE  - appears euvolemic at this time  - daily weights  - strict I&Os  - diabetic-renal-DASH diet w/ fluid restriction  - goal net negative fluid balance  - c/w tele

## 2018-09-07 NOTE — PROGRESS NOTE ADULT - SUBJECTIVE AND OBJECTIVE BOX
Reid Martinez MD  Interventional Cardiology / Advance Heart Failure and Cardiac Transplant Specialist  [place] Office : [phone] [place] [address] [address] [address]  Tel:   [place] Office : [address] [facility] [place] [address]  Tel: [phone]  Cell : [phone]    Subjective : Pt lying in bed comfortable, not in distress, denies any chest pain ,  SOB improved  	  MEDICATIONS:  amiodarone    Tablet 200 milliGRAM(s) Oral at bedtime  aspirin  chewable 81 milliGRAM(s) Oral daily  clopidogrel Tablet 75 milliGRAM(s) Oral daily  heparin  Injectable 5000 Unit(s) SubCutaneous every 8 hours  metoprolol tartrate 25 milliGRAM(s) Oral two times a day  tamsulosin 0.4 milliGRAM(s) Oral at bedtime    piperacillin/tazobactam IVPB. 3.375 Gram(s) IV Intermittent every 12 hours    ALBUTerol    90 MICROgram(s) HFA Inhaler 2 Puff(s) Inhalation every 6 hours  tiotropium 18 MICROgram(s) Capsule 1 Capsule(s) Inhalation daily    acetaminophen   Tablet .. 650 milliGRAM(s) Oral every 6 hours PRN  acetaminophen  IVPB. 1000 milliGRAM(s) IV Intermittent once  busPIRone 5 milliGRAM(s) Oral two times a day  melatonin 3 milliGRAM(s) Oral at bedtime PRN      atorvastatin 40 milliGRAM(s) Oral at bedtime  dextrose 40% Gel 15 Gram(s) Oral once PRN  dextrose 50% Injectable 12.5 Gram(s) IV Push once  dextrose 50% Injectable 25 Gram(s) IV Push once  dextrose 50% Injectable 25 Gram(s) IV Push once  glucagon  Injectable 1 milliGRAM(s) IntraMuscular once PRN  insulin lispro (HumaLOG) corrective regimen sliding scale   SubCutaneous three times a day before meals  insulin lispro (HumaLOG) corrective regimen sliding scale   SubCutaneous at bedtime    dextrose 5%. 1000 milliLiter(s) IV Continuous <Continuous>  epoetin georgie Injectable 2000 Unit(s) IV Push <User Schedule>  multivitamin 1 Tablet(s) Oral daily      PHYSICAL EXAM:  T(C): 36.9 (09-07-18 @ 12:50), Max: 37.2 (09-07-18 @ 01:30)  HR: 81 (09-07-18 @ 12:50) (66 - 87)  BP: 97/52 (09-07-18 @ 12:50) (97/52 - 131/64)  RR: 18 (09-07-18 @ 12:50) (17 - 18)  SpO2: 100% (09-07-18 @ 12:50) (97% - 100%)  Wt(kg): --  I&O's Summary    07 Sep 2018 07:01  -  07 Sep 2018 14:53  --------------------------------------------------------  IN: 500 mL / OUT: 2000 mL / NET: -1500 mL          Appearance: Normal	  HEENT:   Normal oral mucosa, PERRL, EOMI	  Cardiovascular: Normal S1 S2, No JVD, No murmurs, No edema  Respiratory: mild b/l rhonchi  Gastrointestinal:  Soft, Non-tender, + BS	  Extremities: Normal range of motion, No clubbing, cyanosis or edema                                    7.9    4.12  )-----------( 236      ( 07 Sep 2018 06:45 )             25.8     09-07    141  |  97<L>  |  43<H>  ----------------------------<  110<H>  3.2<L>   |  27  |  5.61<H>    Ca    9.0      07 Sep 2018 06:45  Phos  5.6     09-07  Mg     2.3     09-07    TPro  6.3  /  Alb  3.3  /  TBili  0.3  /  DBili  x   /  AST  12  /  ALT  10  /  AlkPhos  75  09-07    proBNP:   Lipid Profile:   HgA1c:   TSH:

## 2018-09-07 NOTE — PROGRESS NOTE ADULT - SUBJECTIVE AND OBJECTIVE BOX
[facility] Division of Kidney Diseases & Hypertension  HEMODIALYSIS NOTE  --------------------------------------------------------------------------------  Chief Complaint: ESRD/Ongoing hemodialysis requirement    24 hour events/subjective: patient seen and evaluated at dialysis no complaints of SOB.  feeling ok.    PAST HISTORY  --------------------------------------------------------------------------------  No significant changes to PMH, PSH, FHx, SHx, unless otherwise noted    ALLERGIES & MEDICATIONS  --------------------------------------------------------------------------------  Allergies    No Known Allergies    Intolerances      Standing Inpatient Medications  acetaminophen  IVPB. 1000 milliGRAM(s) IV Intermittent once  ALBUTerol    90 MICROgram(s) HFA Inhaler 2 Puff(s) Inhalation every 6 hours  amiodarone    Tablet 200 milliGRAM(s) Oral at bedtime  aspirin  chewable 81 milliGRAM(s) Oral daily  atorvastatin 40 milliGRAM(s) Oral at bedtime  busPIRone 5 milliGRAM(s) Oral two times a day  clopidogrel Tablet 75 milliGRAM(s) Oral daily  dextrose 5%. 1000 milliLiter(s) IV Continuous <Continuous>  dextrose 50% Injectable 12.5 Gram(s) IV Push once  dextrose 50% Injectable 25 Gram(s) IV Push once  dextrose 50% Injectable 25 Gram(s) IV Push once  epoetin georgie Injectable 2000 Unit(s) IV Push <User Schedule>  heparin  Injectable 5000 Unit(s) SubCutaneous every 8 hours  insulin lispro (HumaLOG) corrective regimen sliding scale   SubCutaneous three times a day before meals  insulin lispro (HumaLOG) corrective regimen sliding scale   SubCutaneous at bedtime  metoprolol tartrate 25 milliGRAM(s) Oral two times a day  multivitamin 1 Tablet(s) Oral daily  piperacillin/tazobactam IVPB. 3.375 Gram(s) IV Intermittent every 12 hours  tamsulosin 0.4 milliGRAM(s) Oral at bedtime  tiotropium 18 MICROgram(s) Capsule 1 Capsule(s) Inhalation daily    PRN Inpatient Medications  acetaminophen   Tablet .. 650 milliGRAM(s) Oral every 6 hours PRN  dextrose 40% Gel 15 Gram(s) Oral once PRN  glucagon  Injectable 1 milliGRAM(s) IntraMuscular once PRN  melatonin 3 milliGRAM(s) Oral at bedtime PRN      REVIEW OF SYSTEMS  --------------------------------------------------------------------------------  Gen: no fever  Cv: no pain  Pulm: no difficulty breathing  GI: no abdominal pain    VITALS/PHYSICAL EXAM  --------------------------------------------------------------------------------  T(C): 36.8 (09-07-18 @ 10:10), Max: 37.2 (09-07-18 @ 01:30)  HR: 68 (09-07-18 @ 10:10) (66 - 87)  BP: 130/60 (09-07-18 @ 10:10) (113/52 - 131/64)  RR: 18 (09-07-18 @ 10:10) (17 - 18)  SpO2: 100% (09-07-18 @ 05:17) (96% - 100%)  Wt(kg): --        09-07-18 @ 07:01  -  09-07-18 @ 11:02  --------------------------------------------------------  IN: 500 mL / OUT: 2000 mL / NET: -1500 mL      Physical Exam:                Gen: Elderly male, on room air  	HEENT: anicteric  	Pulm: clear lungs today  	CV:  S1S2 rrr  	Abd: +BS, soft   	Ext: No B/L Lower ext edema  	Neuro: awake alert responsive  	Skin: Warm, without rashes  	Vascular access: Right UE AVF present; thrill and bruit heard    LABS/STUDIES  --------------------------------------------------------------------------------              7.9    4.12  >-----------<  236      [09-07-18 @ 06:45]              25.8     141  |  97  |  43  ----------------------------<  110      [09-07-18 @ 06:45]  3.2   |  27  |  5.61        Ca     9.0     [09-07-18 @ 06:45]      Mg     2.3     [09-07-18 @ 06:45]      Phos  5.6     [09-07-18 @ 06:45]    TPro  6.3  /  Alb  3.3  /  TBili  0.3  /  DBili  x   /  AST  12  /  ALT  10  /  AlkPhos  75  [09-07-18 @ 06:45]          TSH 8.78      [09-04-18 @ 06:12]  Lipid: chol 106, TG 97, HDL 33, LDL 54      [09-04-18 @ 06:12]    HBsAg NEGATIVE      [09-03-18 @ 10:16]

## 2018-09-07 NOTE — PROGRESS NOTE ADULT - PROBLEM SELECTOR PLAN 1
Initially presented with worsening SOB. Likely 2/2 CHF exacerbation vs fluid overload from ESRD vs PNA; now improved following BiPAP and Lasix 40mg IVP x1. - afebrile, no leukocytosis, and vitals wnl  - developed productive cough during this admission  - CXR neg for focal consolidation  - BCx NGTD  - c/w zosyn for possible aspiration pneumonia (day 3)  - failed speech and swallow eval  - now NPO for aspiration risk  - Nutrition consult placed Initially presented with worsening SOB. Likely 2/2 CHF exacerbation vs fluid overload from ESRD vs PNA; now improved following BiPAP and Lasix 40mg IVP x1. - afebrile, no leukocytosis, and vitals wnl  - developed productive cough during this admission  - CXR neg for focal consolidation  - BCx NGTD  - c/w zosyn for possible aspiration pneumonia (day 3)  - failed speech and swallow eval; cineesophagogram showed no aspiration   - dysphagia diet-honey consistency  - Nutrition consult placed

## 2018-09-07 NOTE — PROGRESS NOTE ADULT - ASSESSMENT
a/p     1) Acute on chronic systolic CHF - clinically improving , continue volume removal with HD, recent echo shows mod LV dysfunction  mild troponin elevation likely sec to ESRD and CHF. cont with BB . no acei or arb due to borderline low bp    2) CAD s/p PCI - on ASA and plavix ,     3) ESRD on HD    4)  Afib - h/o GI bleed and anemia off coumadin, on ASA and plavix

## 2018-09-07 NOTE — PROGRESS NOTE ADULT - PROBLEM SELECTOR PLAN 4
- s/p BABAR x5 in Sept 2017  -Troponemia to 500s on this admission 2/2 ESRD and volume overloaded status  - c/w ASA + Plavix + statin  - h/o Afib (not on AC given hx of GI bleeding)

## 2018-09-07 NOTE — PROGRESS NOTE ADULT - PROBLEM SELECTOR PLAN 1
Patient had emergent HD for volume overload.  Patient seen and evaluated on HD this morning tolerating well.

## 2018-09-08 LAB
BUN SERPL-MCNC: 27 MG/DL — HIGH (ref 7–23)
CALCIUM SERPL-MCNC: 9.3 MG/DL — SIGNIFICANT CHANGE UP (ref 8.4–10.5)
CHLORIDE SERPL-SCNC: 96 MMOL/L — LOW (ref 98–107)
CO2 SERPL-SCNC: 27 MMOL/L — SIGNIFICANT CHANGE UP (ref 22–31)
CREAT SERPL-MCNC: 4.45 MG/DL — HIGH (ref 0.5–1.3)
GLUCOSE SERPL-MCNC: 96 MG/DL — SIGNIFICANT CHANGE UP (ref 70–99)
HCT VFR BLD CALC: 29 % — LOW (ref 39–50)
HGB BLD-MCNC: 9 G/DL — LOW (ref 13–17)
MAGNESIUM SERPL-MCNC: 2.2 MG/DL — SIGNIFICANT CHANGE UP (ref 1.6–2.6)
MCHC RBC-ENTMCNC: 29.1 PG — SIGNIFICANT CHANGE UP (ref 27–34)
MCHC RBC-ENTMCNC: 31 % — LOW (ref 32–36)
MCV RBC AUTO: 93.9 FL — SIGNIFICANT CHANGE UP (ref 80–100)
NRBC # FLD: 0 — SIGNIFICANT CHANGE UP
PHOSPHATE SERPL-MCNC: 4.6 MG/DL — HIGH (ref 2.5–4.5)
PLATELET # BLD AUTO: 277 K/UL — SIGNIFICANT CHANGE UP (ref 150–400)
PMV BLD: 9.8 FL — SIGNIFICANT CHANGE UP (ref 7–13)
POTASSIUM SERPL-MCNC: 3.4 MMOL/L — LOW (ref 3.5–5.3)
POTASSIUM SERPL-SCNC: 3.4 MMOL/L — LOW (ref 3.5–5.3)
RBC # BLD: 3.09 M/UL — LOW (ref 4.2–5.8)
RBC # FLD: 14.3 % — SIGNIFICANT CHANGE UP (ref 10.3–14.5)
SODIUM SERPL-SCNC: 141 MMOL/L — SIGNIFICANT CHANGE UP (ref 135–145)
WBC # BLD: 4.18 K/UL — SIGNIFICANT CHANGE UP (ref 3.8–10.5)
WBC # FLD AUTO: 4.18 K/UL — SIGNIFICANT CHANGE UP (ref 3.8–10.5)

## 2018-09-08 RX ADMIN — AMIODARONE HYDROCHLORIDE 200 MILLIGRAM(S): 400 TABLET ORAL at 22:02

## 2018-09-08 RX ADMIN — Medication 1 TABLET(S): at 11:23

## 2018-09-08 RX ADMIN — TIOTROPIUM BROMIDE 1 CAPSULE(S): 18 CAPSULE ORAL; RESPIRATORY (INHALATION) at 11:22

## 2018-09-08 RX ADMIN — PIPERACILLIN AND TAZOBACTAM 25 GRAM(S): 4; .5 INJECTION, POWDER, LYOPHILIZED, FOR SOLUTION INTRAVENOUS at 18:23

## 2018-09-08 RX ADMIN — ATORVASTATIN CALCIUM 40 MILLIGRAM(S): 80 TABLET, FILM COATED ORAL at 22:02

## 2018-09-08 RX ADMIN — Medication 81 MILLIGRAM(S): at 11:23

## 2018-09-08 RX ADMIN — ALBUTEROL 2 PUFF(S): 90 AEROSOL, METERED ORAL at 22:02

## 2018-09-08 RX ADMIN — ALBUTEROL 2 PUFF(S): 90 AEROSOL, METERED ORAL at 11:22

## 2018-09-08 RX ADMIN — Medication 25 MILLIGRAM(S): at 18:25

## 2018-09-08 RX ADMIN — Medication 5 MILLIGRAM(S): at 18:24

## 2018-09-08 RX ADMIN — CLOPIDOGREL BISULFATE 75 MILLIGRAM(S): 75 TABLET, FILM COATED ORAL at 11:23

## 2018-09-08 RX ADMIN — Medication 25 MILLIGRAM(S): at 05:17

## 2018-09-08 RX ADMIN — TAMSULOSIN HYDROCHLORIDE 0.4 MILLIGRAM(S): 0.4 CAPSULE ORAL at 22:01

## 2018-09-08 RX ADMIN — Medication 5 MILLIGRAM(S): at 05:17

## 2018-09-08 RX ADMIN — PIPERACILLIN AND TAZOBACTAM 25 GRAM(S): 4; .5 INJECTION, POWDER, LYOPHILIZED, FOR SOLUTION INTRAVENOUS at 05:18

## 2018-09-08 RX ADMIN — ALBUTEROL 2 PUFF(S): 90 AEROSOL, METERED ORAL at 05:17

## 2018-09-08 RX ADMIN — ALBUTEROL 2 PUFF(S): 90 AEROSOL, METERED ORAL at 18:23

## 2018-09-08 NOTE — DIETITIAN INITIAL EVALUATION ADULT. - OTHER INFO
Nutrition consult ordered for Nutrition assessment,74 y/o male admitted with the DX of shortness of breath , CKD, HTN,.status post s/s evaluation on 9/6, no po diet   recommended by therapist due to pt decreased cooperation for evaluation. As per RN pt tolerating dysphagia diet with no coughing and   chewing swallowing difficulty. No N/V/D or weight issue s reported at this time. LABS reviewed, decreased  K and elevated Phos levels noted, recommend to only restrict phosphorous and monitor labs. Diet consistency deferred to MD. RD remains available.

## 2018-09-08 NOTE — PROGRESS NOTE ADULT - PROBLEM SELECTOR PLAN 1
Initially presented with worsening SOB. Likely 2/2 CHF exacerbation vs fluid overload from ESRD vs PNA; now improved following BiPAP and Lasix 40mg IVP x1. - afebrile, no leukocytosis, and vitals wnl  - developed productive cough during this admission  - CXR neg for focal consolidation  - BCx NGTD  - c/w zosyn for possible aspiration pneumonia (day 3)  - failed speech and swallow eval; cineesophagogram showed no aspiration   - dysphagia diet-honey consistency  - Nutrition consult placed

## 2018-09-08 NOTE — PROGRESS NOTE ADULT - PROBLEM SELECTOR PLAN 2
Hx of CKD, on dialysis M/W/F, acutely dyspneic at rehab, possibly 2/2 fluid overload in setting of ADHF  -Dialysis today  - f/u renal recs  - strict I/O, daily weight, avoid nephrotoxin, hold ACEi/ARB/RCA, diabetic-DASH-renal diet w/ fluid restriction

## 2018-09-08 NOTE — PROGRESS NOTE ADULT - SUBJECTIVE AND OBJECTIVE BOX
Ramiro ePrkins PGY-1   [facility] Pager # [phone]  NS Pager # [phone]      Patient is a 73y old  Male who presents with a chief complaint of CHF exacerbation (07 Sep 2018 11:53)      SUBJECTIVE: No acute events overnight. Patient seen and examined at bedside.    REVIEW OF SYSTEMS: Unable to Obtain      MEDICATIONS  (STANDING):  acetaminophen  IVPB. 1000 milliGRAM(s) IV Intermittent once  ALBUTerol    90 MICROgram(s) HFA Inhaler 2 Puff(s) Inhalation every 6 hours  amiodarone    Tablet 200 milliGRAM(s) Oral at bedtime  aspirin  chewable 81 milliGRAM(s) Oral daily  atorvastatin 40 milliGRAM(s) Oral at bedtime  busPIRone 5 milliGRAM(s) Oral two times a day  clopidogrel Tablet 75 milliGRAM(s) Oral daily  dextrose 5%. 1000 milliLiter(s) (50 mL/Hr) IV Continuous <Continuous>  dextrose 50% Injectable 12.5 Gram(s) IV Push once  dextrose 50% Injectable 25 Gram(s) IV Push once  dextrose 50% Injectable 25 Gram(s) IV Push once  epoetin georgie Injectable 2000 Unit(s) IV Push <User Schedule>  heparin  Injectable 5000 Unit(s) SubCutaneous every 8 hours  insulin lispro (HumaLOG) corrective regimen sliding scale   SubCutaneous three times a day before meals  insulin lispro (HumaLOG) corrective regimen sliding scale   SubCutaneous at bedtime  metoprolol tartrate 25 milliGRAM(s) Oral two times a day  multivitamin 1 Tablet(s) Oral daily  piperacillin/tazobactam IVPB. 3.375 Gram(s) IV Intermittent every 12 hours  tamsulosin 0.4 milliGRAM(s) Oral at bedtime  tiotropium 18 MICROgram(s) Capsule 1 Capsule(s) Inhalation daily    MEDICATIONS  (PRN):  acetaminophen   Tablet .. 650 milliGRAM(s) Oral every 6 hours PRN Temp greater or equal to 38C (100.4F), Mild Pain (1 - 3), Moderate Pain (4 - 6)  dextrose 40% Gel 15 Gram(s) Oral once PRN Blood Glucose LESS THAN 70 milliGRAM(s)/deciliter  glucagon  Injectable 1 milliGRAM(s) IntraMuscular once PRN Glucose LESS THAN 70 milligrams/deciliter  melatonin 3 milliGRAM(s) Oral at bedtime PRN Insomnia        Objective:    Vitals: Vital Signs Last 24 Hrs  T(C): 36.7 (09-08-18 @ 04:00), Max: 36.9 (09-07-18 @ 06:55)  T(F): 98 (09-08-18 @ 04:00), Max: 98.4 (09-07-18 @ 06:55)  HR: 77 (09-08-18 @ 04:00) (64 - 82)  BP: 118/61 (09-08-18 @ 04:00) (97/52 - 130/60)  BP(mean): --  RR: 18 (09-08-18 @ 04:00) (18 - 18)  SpO2: 98% (09-08-18 @ 04:00) (98% - 100%)            I&O's Summary    07 Sep 2018 07:01  -  08 Sep 2018 06:25  --------------------------------------------------------  IN: 1420 mL / OUT: 2000 mL / NET: -580 mL        PHYSICAL EXAM:  GENERAL: NAD  HEAD:  Atraumatic, Normocephalic  CHEST/LUNG: Clear to auscultation bilaterally; No rales or wheezing  HEART: Regular rate and rhythm; No murmurs, rubs, or gallops  ABDOMEN: Soft, nontender, nondistended  EXTREMITIES:  No clubbing, cyanosis, or edema  SKIN: No rashes or lesions  NERVOUS SYSTEM:  Alert & Oriented X2    LABS:  09-07    141  |  97<L>  |  43<H>  ----------------------------<  110<H>  3.2<L>   |  27  |  5.61<H>  09-06    140  |  95<L>  |  31<H>  ----------------------------<  68<L>  3.6   |  25  |  4.14<H>    Ca    9.0      07 Sep 2018 06:45  Ca    9.0      06 Sep 2018 05:38  Phos  5.6     09-07  Mg     2.3     09-07    TPro  6.3  /  Alb  3.3  /  TBili  0.3  /  DBili  x   /  AST  12  /  ALT  10  /  AlkPhos  75  09-07  TPro  6.7  /  Alb  3.3  /  TBili  0.3  /  DBili  x   /  AST  13  /  ALT  11  /  AlkPhos  80  09-06                                         7.9    4.12  )-----------( 236      ( 07 Sep 2018 06:45 )             25.8                         8.5    4.37  )-----------( 227      ( 06 Sep 2018 05:38 )             28.3     CAPILLARY BLOOD GLUCOSE      POCT Blood Glucose.: 144 mg/dL (07 Sep 2018 21:41)  POCT Blood Glucose.: 107 mg/dL (07 Sep 2018 17:00)  POCT Blood Glucose.: 114 mg/dL (07 Sep 2018 11:56)  POCT Blood Glucose.: 105 mg/dL (07 Sep 2018 08:29)    RADIOLOGY:  Imaging Personally Reviewed: YES      Consultants involved in case: YES  Consultant(s) Notes Reviewed:  YES  Care Discussed with Consultants/Other Providers       Ramiro Perkins, PGY-1

## 2018-09-08 NOTE — PROGRESS NOTE ADULT - ASSESSMENT
73M h/o ESRD (Dialysis M/W/F), Bipolar, BPH, COPD, HTN, CAD presents from [place] for [facility]ION and desaturation likely 2/2 acute decompensated heart failure vs acute pulmonary edema from underlying ESRD vs infectious etiology. Now symptomatically improved following BiPAP and Lasix IVP 40mg x1. Concerns for aspiration pneumonia given cough productive of greenish sputum. Empirically started on zosyn. Patient has remained afebrile w/o leukocytosis. Vitals stable

## 2018-09-08 NOTE — DIETITIAN INITIAL EVALUATION ADULT. - DIET TYPE
dysphagia 1, pureed, honey consistency fluid/renal replacement pts:no protein restr,no conc K & phos, low sodium/DASH/TLC (sodium and cholesterol restricted diet)

## 2018-09-08 NOTE — PROGRESS NOTE ADULT - SUBJECTIVE AND OBJECTIVE BOX
Reid Martinez MD  Interventional Cardiology / Advance Heart Failure and Cardiac Transplant Specialist  [place] Office : [phone] [place] [address]  Tel:   [place] Office : [address] [facility] [address] [address]  Tel: [phone]  Cell : [phone]    Subjective : Pt lying in bed comfortable, not in distress, denies any chest pain or SOB  	  MEDICATIONS:  amiodarone    Tablet 200 milliGRAM(s) Oral at bedtime  aspirin  chewable 81 milliGRAM(s) Oral daily  clopidogrel Tablet 75 milliGRAM(s) Oral daily  heparin  Injectable 5000 Unit(s) SubCutaneous every 8 hours  metoprolol tartrate 25 milliGRAM(s) Oral two times a day  tamsulosin 0.4 milliGRAM(s) Oral at bedtime    piperacillin/tazobactam IVPB. 3.375 Gram(s) IV Intermittent every 12 hours    ALBUTerol    90 MICROgram(s) HFA Inhaler 2 Puff(s) Inhalation every 6 hours  tiotropium 18 MICROgram(s) Capsule 1 Capsule(s) Inhalation daily    acetaminophen   Tablet .. 650 milliGRAM(s) Oral every 6 hours PRN  acetaminophen  IVPB. 1000 milliGRAM(s) IV Intermittent once  busPIRone 5 milliGRAM(s) Oral two times a day  melatonin 3 milliGRAM(s) Oral at bedtime PRN      atorvastatin 40 milliGRAM(s) Oral at bedtime  dextrose 40% Gel 15 Gram(s) Oral once PRN  dextrose 50% Injectable 12.5 Gram(s) IV Push once  dextrose 50% Injectable 25 Gram(s) IV Push once  dextrose 50% Injectable 25 Gram(s) IV Push once  glucagon  Injectable 1 milliGRAM(s) IntraMuscular once PRN  insulin lispro (HumaLOG) corrective regimen sliding scale   SubCutaneous three times a day before meals  insulin lispro (HumaLOG) corrective regimen sliding scale   SubCutaneous at bedtime    dextrose 5%. 1000 milliLiter(s) IV Continuous <Continuous>  epoetin georgie Injectable 2000 Unit(s) IV Push <User Schedule>  multivitamin 1 Tablet(s) Oral daily      PHYSICAL EXAM:  T(C): 37 (09-08-18 @ 13:30), Max: 37 (09-08-18 @ 13:30)  HR: 73 (09-08-18 @ 13:30) (64 - 82)  BP: 137/65 (09-08-18 @ 13:30) (98/67 - 137/65)  RR: 17 (09-08-18 @ 13:30) (17 - 18)  SpO2: 100% (09-08-18 @ 13:30) (97% - 100%)  Wt(kg): --  I&O's Summary    07 Sep 2018 07:01  -  08 Sep 2018 07:00  --------------------------------------------------------  IN: 1570 mL / OUT: 2000 mL / NET: -430 mL          Appearance: Normal	  HEENT:   Normal oral mucosa, PERRL, EOMI	  Cardiovascular: Normal S1 S2, No JVD, No murmurs, No edema  Respiratory: Lungs clear to auscultation	  Gastrointestinal:  Soft, Non-tender, + BS	  Extremities: Normal range of motion, No clubbing, cyanosis or edema                                    9.0    4.18  )-----------( 277      ( 08 Sep 2018 05:40 )             29.0     09-08    141  |  96<L>  |  27<H>  ----------------------------<  96  3.4<L>   |  27  |  4.45<H>    Ca    9.3      08 Sep 2018 05:40  Phos  4.6     09-08  Mg     2.2     09-08    TPro  6.3  /  Alb  3.3  /  TBili  0.3  /  DBili  x   /  AST  12  /  ALT  10  /  AlkPhos  75  09-07    proBNP:   Lipid Profile:   HgA1c:   TSH:

## 2018-09-08 NOTE — PROGRESS NOTE ADULT - PROBLEM SELECTOR PLAN 3
- TTE: EF 35%  - appears euvolemic at this time  - daily weights  - strict I&Os  - diabetic-renal-DASH diet w/ fluid restriction  - goal net negative fluid balance  - c/w tele

## 2018-09-09 LAB
ALBUMIN SERPL ELPH-MCNC: 3.3 G/DL — SIGNIFICANT CHANGE UP (ref 3.3–5)
ALP SERPL-CCNC: 72 U/L — SIGNIFICANT CHANGE UP (ref 40–120)
ALT FLD-CCNC: 18 U/L — SIGNIFICANT CHANGE UP (ref 4–41)
AST SERPL-CCNC: 19 U/L — SIGNIFICANT CHANGE UP (ref 4–40)
BACTERIA BLD CULT: SIGNIFICANT CHANGE UP
BACTERIA BLD CULT: SIGNIFICANT CHANGE UP
BASOPHILS # BLD AUTO: 0.02 K/UL — SIGNIFICANT CHANGE UP (ref 0–0.2)
BASOPHILS NFR BLD AUTO: 0.4 % — SIGNIFICANT CHANGE UP (ref 0–2)
BILIRUB SERPL-MCNC: < 0.2 MG/DL — LOW (ref 0.2–1.2)
BUN SERPL-MCNC: 49 MG/DL — HIGH (ref 7–23)
CALCIUM SERPL-MCNC: 9 MG/DL — SIGNIFICANT CHANGE UP (ref 8.4–10.5)
CHLORIDE SERPL-SCNC: 94 MMOL/L — LOW (ref 98–107)
CO2 SERPL-SCNC: 27 MMOL/L — SIGNIFICANT CHANGE UP (ref 22–31)
CREAT SERPL-MCNC: 6.06 MG/DL — HIGH (ref 0.5–1.3)
EOSINOPHIL # BLD AUTO: 0.34 K/UL — SIGNIFICANT CHANGE UP (ref 0–0.5)
EOSINOPHIL NFR BLD AUTO: 6.7 % — HIGH (ref 0–6)
GLUCOSE SERPL-MCNC: 147 MG/DL — HIGH (ref 70–99)
HCT VFR BLD CALC: 28.6 % — LOW (ref 39–50)
HGB BLD-MCNC: 9.1 G/DL — LOW (ref 13–17)
IMM GRANULOCYTES # BLD AUTO: 0.01 # — SIGNIFICANT CHANGE UP
IMM GRANULOCYTES NFR BLD AUTO: 0.2 % — SIGNIFICANT CHANGE UP (ref 0–1.5)
LYMPHOCYTES # BLD AUTO: 1.49 K/UL — SIGNIFICANT CHANGE UP (ref 1–3.3)
LYMPHOCYTES # BLD AUTO: 29.2 % — SIGNIFICANT CHANGE UP (ref 13–44)
MAGNESIUM SERPL-MCNC: 2.3 MG/DL — SIGNIFICANT CHANGE UP (ref 1.6–2.6)
MCHC RBC-ENTMCNC: 30 PG — SIGNIFICANT CHANGE UP (ref 27–34)
MCHC RBC-ENTMCNC: 31.8 % — LOW (ref 32–36)
MCV RBC AUTO: 94.4 FL — SIGNIFICANT CHANGE UP (ref 80–100)
MONOCYTES # BLD AUTO: 0.4 K/UL — SIGNIFICANT CHANGE UP (ref 0–0.9)
MONOCYTES NFR BLD AUTO: 7.8 % — SIGNIFICANT CHANGE UP (ref 2–14)
NEUTROPHILS # BLD AUTO: 2.85 K/UL — SIGNIFICANT CHANGE UP (ref 1.8–7.4)
NEUTROPHILS NFR BLD AUTO: 55.7 % — SIGNIFICANT CHANGE UP (ref 43–77)
NRBC # FLD: 0 — SIGNIFICANT CHANGE UP
PHOSPHATE SERPL-MCNC: 5.9 MG/DL — HIGH (ref 2.5–4.5)
PLATELET # BLD AUTO: 241 K/UL — SIGNIFICANT CHANGE UP (ref 150–400)
PMV BLD: 9.6 FL — SIGNIFICANT CHANGE UP (ref 7–13)
POTASSIUM SERPL-MCNC: 3.2 MMOL/L — LOW (ref 3.5–5.3)
POTASSIUM SERPL-SCNC: 3.2 MMOL/L — LOW (ref 3.5–5.3)
PROT SERPL-MCNC: 6.7 G/DL — SIGNIFICANT CHANGE UP (ref 6–8.3)
RBC # BLD: 3.03 M/UL — LOW (ref 4.2–5.8)
RBC # FLD: 14.4 % — SIGNIFICANT CHANGE UP (ref 10.3–14.5)
SODIUM SERPL-SCNC: 142 MMOL/L — SIGNIFICANT CHANGE UP (ref 135–145)
WBC # BLD: 5.11 K/UL — SIGNIFICANT CHANGE UP (ref 3.8–10.5)
WBC # FLD AUTO: 5.11 K/UL — SIGNIFICANT CHANGE UP (ref 3.8–10.5)

## 2018-09-09 PROCEDURE — 99231 SBSQ HOSP IP/OBS SF/LOW 25: CPT | Mod: GC

## 2018-09-09 RX ORDER — POTASSIUM CHLORIDE 20 MEQ
20 PACKET (EA) ORAL ONCE
Qty: 0 | Refills: 0 | Status: DISCONTINUED | OUTPATIENT
Start: 2018-09-09 | End: 2018-09-10

## 2018-09-09 RX ADMIN — Medication 1 TABLET(S): at 12:34

## 2018-09-09 RX ADMIN — CLOPIDOGREL BISULFATE 75 MILLIGRAM(S): 75 TABLET, FILM COATED ORAL at 12:34

## 2018-09-09 RX ADMIN — Medication 5 MILLIGRAM(S): at 05:44

## 2018-09-09 RX ADMIN — TIOTROPIUM BROMIDE 1 CAPSULE(S): 18 CAPSULE ORAL; RESPIRATORY (INHALATION) at 12:34

## 2018-09-09 RX ADMIN — Medication 81 MILLIGRAM(S): at 12:34

## 2018-09-09 RX ADMIN — ALBUTEROL 2 PUFF(S): 90 AEROSOL, METERED ORAL at 21:14

## 2018-09-09 RX ADMIN — PIPERACILLIN AND TAZOBACTAM 25 GRAM(S): 4; .5 INJECTION, POWDER, LYOPHILIZED, FOR SOLUTION INTRAVENOUS at 05:31

## 2018-09-09 RX ADMIN — Medication 4: at 17:25

## 2018-09-09 RX ADMIN — ALBUTEROL 2 PUFF(S): 90 AEROSOL, METERED ORAL at 05:30

## 2018-09-09 RX ADMIN — ALBUTEROL 2 PUFF(S): 90 AEROSOL, METERED ORAL at 17:22

## 2018-09-09 RX ADMIN — Medication 5 MILLIGRAM(S): at 17:22

## 2018-09-09 RX ADMIN — AMIODARONE HYDROCHLORIDE 200 MILLIGRAM(S): 400 TABLET ORAL at 21:14

## 2018-09-09 RX ADMIN — PIPERACILLIN AND TAZOBACTAM 25 GRAM(S): 4; .5 INJECTION, POWDER, LYOPHILIZED, FOR SOLUTION INTRAVENOUS at 18:42

## 2018-09-09 RX ADMIN — Medication 25 MILLIGRAM(S): at 17:22

## 2018-09-09 RX ADMIN — Medication 25 MILLIGRAM(S): at 05:31

## 2018-09-09 NOTE — PROGRESS NOTE ADULT - SUBJECTIVE AND OBJECTIVE BOX
Reid Martinez MD  Interventional Cardiology / Advance Heart Failure and Cardiac Transplant Specialist  [place] Office : [address] [place] [address]  Tel:   [place] Office : [address] [facility] [address] [address]  Tel: [phone]  Cell : [phone]    Subjective : Pt lying in bed comfortable, not in distress, denies any chest pain or SOB  	  MEDICATIONS:  amiodarone    Tablet 200 milliGRAM(s) Oral at bedtime  aspirin  chewable 81 milliGRAM(s) Oral daily  clopidogrel Tablet 75 milliGRAM(s) Oral daily  heparin  Injectable 5000 Unit(s) SubCutaneous every 8 hours  metoprolol tartrate 25 milliGRAM(s) Oral two times a day  tamsulosin 0.4 milliGRAM(s) Oral at bedtime    piperacillin/tazobactam IVPB. 3.375 Gram(s) IV Intermittent every 12 hours    ALBUTerol    90 MICROgram(s) HFA Inhaler 2 Puff(s) Inhalation every 6 hours  tiotropium 18 MICROgram(s) Capsule 1 Capsule(s) Inhalation daily    acetaminophen   Tablet .. 650 milliGRAM(s) Oral every 6 hours PRN  acetaminophen  IVPB. 1000 milliGRAM(s) IV Intermittent once  busPIRone 5 milliGRAM(s) Oral two times a day  melatonin 3 milliGRAM(s) Oral at bedtime PRN      atorvastatin 40 milliGRAM(s) Oral at bedtime  dextrose 40% Gel 15 Gram(s) Oral once PRN  dextrose 50% Injectable 12.5 Gram(s) IV Push once  dextrose 50% Injectable 25 Gram(s) IV Push once  dextrose 50% Injectable 25 Gram(s) IV Push once  glucagon  Injectable 1 milliGRAM(s) IntraMuscular once PRN  insulin lispro (HumaLOG) corrective regimen sliding scale   SubCutaneous three times a day before meals  insulin lispro (HumaLOG) corrective regimen sliding scale   SubCutaneous at bedtime    dextrose 5%. 1000 milliLiter(s) IV Continuous <Continuous>  epoetin georgie Injectable 2000 Unit(s) IV Push <User Schedule>  multivitamin 1 Tablet(s) Oral daily      PHYSICAL EXAM:  T(C): 36.8 (09-09-18 @ 08:26), Max: 37.1 (09-08-18 @ 17:57)  HR: 69 (09-09-18 @ 08:26) (60 - 78)  BP: 130/56 (09-09-18 @ 08:26) (108/64 - 137/65)  RR: 17 (09-09-18 @ 08:26) (17 - 17)  SpO2: 99% (09-09-18 @ 08:26) (97% - 100%)  Wt(kg): --  I&O's Summary    08 Sep 2018 07:01  -  09 Sep 2018 07:00  --------------------------------------------------------  IN: 100 mL / OUT: 0 mL / NET: 100 mL          Appearance: Normal	  HEENT:   Normal oral mucosa, PERRL, EOMI	  Cardiovascular: Normal S1 S2, No JVD, No murmurs, No edema  Respiratory: Lungs clear to auscultation	  Gastrointestinal:  Soft, Non-tender, + BS	  Extremities: Normal range of motion, No clubbing, cyanosis or edema                                    9.0    4.18  )-----------( 277      ( 08 Sep 2018 05:40 )             29.0     09-08    141  |  96<L>  |  27<H>  ----------------------------<  96  3.4<L>   |  27  |  4.45<H>    Ca    9.3      08 Sep 2018 05:40  Phos  4.6     09-08  Mg     2.2     09-08      proBNP:   Lipid Profile:   HgA1c:   TSH:

## 2018-09-09 NOTE — PROGRESS NOTE ADULT - SUBJECTIVE AND OBJECTIVE BOX
[facility] DIVISION OF KIDNEY DISEASES AND HYPERTENSION -- FOLLOW UP NOTE  --------------------------------------------------------------------------------  HPI: 71 y/o M with ESRD on HD (MWF), CAD, CHF, DM, HTN, HLD, AF on coumadin, CVA,COPD, bipolar disorder, dementia was sent from rehab center due to SOB. Nephrology was consulted for management of ESRD. Patient with last HD treatment as inpatient on 9/7/18 was tolerated well with 1.3L UF.    Patient seen and examined at bedside this AM. Patient states that he has no shortness of breath, CP, or abdominal pain.    PAST HISTORY  --------------------------------------------------------------------------------  No significant changes to PMH, PSH, FHx, SHx, unless otherwise noted    ALLERGIES & MEDICATIONS  --------------------------------------------------------------------------------  Allergies    No Known Allergies    Intolerances    Standing Inpatient Medications  acetaminophen  IVPB. 1000 milliGRAM(s) IV Intermittent once  ALBUTerol    90 MICROgram(s) HFA Inhaler 2 Puff(s) Inhalation every 6 hours  amiodarone    Tablet 200 milliGRAM(s) Oral at bedtime  aspirin  chewable 81 milliGRAM(s) Oral daily  atorvastatin 40 milliGRAM(s) Oral at bedtime  busPIRone 5 milliGRAM(s) Oral two times a day  clopidogrel Tablet 75 milliGRAM(s) Oral daily  dextrose 5%. 1000 milliLiter(s) IV Continuous <Continuous>  dextrose 50% Injectable 12.5 Gram(s) IV Push once  dextrose 50% Injectable 25 Gram(s) IV Push once  dextrose 50% Injectable 25 Gram(s) IV Push once  epoetin georgie Injectable 2000 Unit(s) IV Push <User Schedule>  heparin  Injectable 5000 Unit(s) SubCutaneous every 8 hours  insulin lispro (HumaLOG) corrective regimen sliding scale   SubCutaneous three times a day before meals  insulin lispro (HumaLOG) corrective regimen sliding scale   SubCutaneous at bedtime  metoprolol tartrate 25 milliGRAM(s) Oral two times a day  multivitamin 1 Tablet(s) Oral daily  piperacillin/tazobactam IVPB. 3.375 Gram(s) IV Intermittent every 12 hours  tamsulosin 0.4 milliGRAM(s) Oral at bedtime  tiotropium 18 MICROgram(s) Capsule 1 Capsule(s) Inhalation daily    REVIEW OF SYSTEMS  --------------------------------------------------------------------------------  Gen: no fever  CV: no chest pain  Pulm: no dyspnea  GI: no abdominal pain    VITALS/PHYSICAL EXAM  --------------------------------------------------------------------------------  T(C): 36.8 (09-09-18 @ 08:26), Max: 37.1 (09-08-18 @ 17:57)  HR: 69 (09-09-18 @ 08:26) (60 - 78)  BP: 130/56 (09-09-18 @ 08:26) (108/64 - 137/65)  RR: 17 (09-09-18 @ 08:26) (17 - 17)  SpO2: 99% (09-09-18 @ 08:26) (97% - 100%)  Wt(kg): --    09-08-18 @ 07:01  -  09-09-18 @ 07:00  --------------------------------------------------------  IN: 100 mL / OUT: 0 mL / NET: 100 mL    Physical Exam:  	Gen: Elderly male, NAD  	HEENT: anicteric  	Pulm: CTA b/l  	CV:  S1S2, RRR  	Abd: +BS, soft   	Ext: No B/L Lower ext edema  	Neuro: awake alert responsive  	Skin: Warm, without rashes  	Vascular access: RUE AVF: site without bleeding, + thrill,+ bruit    LABS/STUDIES  --------------------------------------------------------------------------------              9.0    4.18  >-----------<  277      [09-08-18 @ 05:40]              29.0     141  |  96  |  27  ----------------------------<  96      [09-08-18 @ 05:40]  3.4   |  27  |  4.45        Ca     9.3     [09-08-18 @ 05:40]      Mg     2.2     [09-08-18 @ 05:40]      Phos  4.6     [09-08-18 @ 05:40]    Creatinine Trend:  SCr 4.45 [09-08 @ 05:40]  SCr 5.61 [09-07 @ 06:45]  SCr 4.14 [09-06 @ 05:38]  SCr 5.66 [09-05 @ 05:50]  SCr 4.15 [09-04 @ 06:12]

## 2018-09-09 NOTE — PROGRESS NOTE ADULT - SUBJECTIVE AND OBJECTIVE BOX
Patient is a 73y old  Male who presents with a chief complaint of CHF exacerbation (09 Sep 2018 10:50)      SUBJECTIVE / OVERNIGHT EVENTS:    Pt seen and examined using [facility] ID number [id number] for [place]. He reports no SOB, no CP overnight, no abdominal pain, good po intake. No conern at this time.     MEDICATIONS  (STANDING):  acetaminophen  IVPB. 1000 milliGRAM(s) IV Intermittent once  ALBUTerol    90 MICROgram(s) HFA Inhaler 2 Puff(s) Inhalation every 6 hours  amiodarone    Tablet 200 milliGRAM(s) Oral at bedtime  aspirin  chewable 81 milliGRAM(s) Oral daily  atorvastatin 40 milliGRAM(s) Oral at bedtime  busPIRone 5 milliGRAM(s) Oral two times a day  clopidogrel Tablet 75 milliGRAM(s) Oral daily  dextrose 5%. 1000 milliLiter(s) (50 mL/Hr) IV Continuous <Continuous>  dextrose 50% Injectable 12.5 Gram(s) IV Push once  dextrose 50% Injectable 25 Gram(s) IV Push once  dextrose 50% Injectable 25 Gram(s) IV Push once  epoetin georgie Injectable 2000 Unit(s) IV Push <User Schedule>  heparin  Injectable 5000 Unit(s) SubCutaneous every 8 hours  insulin lispro (HumaLOG) corrective regimen sliding scale   SubCutaneous three times a day before meals  insulin lispro (HumaLOG) corrective regimen sliding scale   SubCutaneous at bedtime  metoprolol tartrate 25 milliGRAM(s) Oral two times a day  multivitamin 1 Tablet(s) Oral daily  piperacillin/tazobactam IVPB. 3.375 Gram(s) IV Intermittent every 12 hours  tamsulosin 0.4 milliGRAM(s) Oral at bedtime  tiotropium 18 MICROgram(s) Capsule 1 Capsule(s) Inhalation daily    MEDICATIONS  (PRN):  acetaminophen   Tablet .. 650 milliGRAM(s) Oral every 6 hours PRN Temp greater or equal to 38C (100.4F), Mild Pain (1 - 3), Moderate Pain (4 - 6)  dextrose 40% Gel 15 Gram(s) Oral once PRN Blood Glucose LESS THAN 70 milliGRAM(s)/deciliter  glucagon  Injectable 1 milliGRAM(s) IntraMuscular once PRN Glucose LESS THAN 70 milligrams/deciliter  melatonin 3 milliGRAM(s) Oral at bedtime PRN Insomnia      Vital Signs Last 24 Hrs  T(C): 36.8 (09 Sep 2018 08:26), Max: 37.1 (08 Sep 2018 17:57)  T(F): 98.3 (09 Sep 2018 08:26), Max: 98.7 (08 Sep 2018 17:57)  HR: 69 (09 Sep 2018 08:26) (60 - 78)  BP: 130/56 (09 Sep 2018 08:26) (108/64 - 137/65)  BP(mean): --  RR: 17 (09 Sep 2018 08:26) (17 - 17)  SpO2: 99% (09 Sep 2018 08:26) (97% - 100%)  CAPILLARY BLOOD GLUCOSE      POCT Blood Glucose.: 104 mg/dL (09 Sep 2018 07:31)  POCT Blood Glucose.: 121 mg/dL (08 Sep 2018 21:49)  POCT Blood Glucose.: 161 mg/dL (08 Sep 2018 17:17)  POCT Blood Glucose.: 136 mg/dL (08 Sep 2018 11:29)    I&O's Summary    08 Sep 2018 07:01  -  09 Sep 2018 07:00  --------------------------------------------------------  IN: 100 mL / OUT: 0 mL / NET: 100 mL        PHYSICAL EXAM:  Vital Signs Last 24 Hrs  T(C): 36.8 (09-09-18 @ 08:26)  T(F): 98.3 (09-09-18 @ 08:26), Max: 98.7 (09-08-18 @ 17:57)  HR: 69 (09-09-18 @ 08:26) (60 - 78)  BP: 130/56 (09-09-18 @ 08:26)  BP(mean): --  RR: 17 (09-09-18 @ 08:26) (17 - 17)  SpO2: 99% (09-09-18 @ 08:26) (97% - 100%)  Wt(kg): --    09-08 @ 07:01  -  09-09 @ 07:00  --------------------------------------------------------  IN: 100 mL / OUT: 0 mL / NET: 100 mL      Constitutional: NAD, awake and alert, cachectic  EYES: EOMI  ENT:  Normal Hearing, no tonsillar exudates   Neck: Soft and supple , no thyromegaly   Respiratory: Breath sounds are clear bilaterally, No wheezing, rales or rhonchi  Cardiovascular: S1 and S2, regular rate and rhythm, no Murmurs, gallops or rubs, no JVD,    Gastrointestinal: Bowel Sounds present, soft, nontender, nondistended, no guarding, no rebound  Extremities: No cyanosis or clubbing; warm to touch  Vascular: 2+ peripheral pulses lower ex  Neurological: No focal deficits, CN II-XII intact bilaterally, sensation to light touch intact in all extremities, gait intact. Pupils are equally reactive to light and symmetrical in size.   Musculoskeletal: 5/5 strength b/l upper and lower extremities; no joint swelling.  Skin: No rashes  Psych: no depression or anhedonia, AAOx3  HEME: no bruises, no nose bleeds      LABS:                        9.0    4.18  )-----------( 277      ( 08 Sep 2018 05:40 )             29.0     09-08    141  |  96<L>  |  27<H>  ----------------------------<  96  3.4<L>   |  27  |  4.45<H>    Ca    9.3      08 Sep 2018 05:40  Phos  4.6     09-08  Mg     2.2     09-08                RADIOLOGY & ADDITIONAL TESTS:    Imaging Personally Reviewed:    Consultant(s) Notes Reviewed:      Care Discussed with Consultants/Other Providers:

## 2018-09-09 NOTE — PROGRESS NOTE ADULT - PROBLEM SELECTOR PLAN 2
Hx of CKD, on dialysis M/W/F, acutely dyspneic at rehab, possibly 2/2 fluid overload in setting of ADHF  - f/u renal recs, will c/w epo 2000   - strict I/O, daily weight, avoid nephrotoxin, hold ACEi/ARB/RCA, diabetic-DASH-renal diet w/ fluid restriction.

## 2018-09-09 NOTE — PROGRESS NOTE ADULT - PROBLEM SELECTOR PLAN 4
- s/p BABRA x5 in Sept 2017  -Troponemia to 500s on this admission 2/2 ESRD and volume overloaded status  - c/w ASA + Plavix + statin  - h/o Afib (not on AC given hx of GI bleeding)

## 2018-09-09 NOTE — PROGRESS NOTE ADULT - PROBLEM SELECTOR PLAN 1
Pt. with ESRD on HD three times a week (MWF). Last HD was on 9/7/18 via RUE AVF. Pt. clinically stable. Labs reviewed. Hgb improved to 9.0 today (9/9/18). Will arrange for maintenance HD tomorrow. Monitor labs Pt. with ESRD on HD TIW (MWF). Last HD was on 9/7/18 via RUE AVF. Pt. clinically stable. Labs reviewed. Hemoglobin improved to 9.0 today (9/9/18). Will arrange for maintenance HD tomorrow. Monitor BP and labs

## 2018-09-09 NOTE — PROGRESS NOTE ADULT - PROBLEM SELECTOR PLAN 3
TTE: EF 35%  - appears euvolemic at this time  - daily weights  - strict I&Os  - diabetic-renal-DASH diet w/ fluid restriction  - goal net negative fluid balance, UOP not recorded yesterday, pt euvolemic on exam  - c/w tele.

## 2018-09-09 NOTE — PROGRESS NOTE ADULT - PROBLEM SELECTOR PLAN 1
Initially presented with worsening SOB. Likely 2/2 CHF exacerbation vs fluid overload from ESRD vs PNA; now improved following BiPAP and Lasix 40mg IVP x1. - afebrile, no leukocytosis, and vitals wnl  - developed productive cough during this admission  - CXR neg for focal consolidation  - BCx NGTD  - c/w zosyn for possible aspiration pneumonia (day 4)  - failed speech and swallow eval; cineesophagogram showed no aspiration   - dysphagia diet-honey consistency  - Nutrition consult placed.

## 2018-09-09 NOTE — PROGRESS NOTE ADULT - ASSESSMENT
73M h/o ESRD (Dialysis M/W/F), Bipolar, BPH, COPD, HTN, CAD presents from [place] for CHAMPION and desaturation likely 2/2 acute decompensated heart failure vs acute pulmonary edema from underlying ESRD vs infectious etiology. Now symptomatically improved following BiPAP and Lasix IVP 40mg x1. Concerns for aspiration pneumonia given cough productive of greenish sputum. Empirically started on zosyn. Patient has remained afebrile w/o leukocytosis. Vitals stable

## 2018-09-10 DIAGNOSIS — E87.79 OTHER FLUID OVERLOAD: ICD-10-CM

## 2018-09-10 DIAGNOSIS — N18.6 END STAGE RENAL DISEASE: ICD-10-CM

## 2018-09-10 PROCEDURE — 90935 HEMODIALYSIS ONE EVALUATION: CPT

## 2018-09-10 RX ORDER — LANOLIN ALCOHOL/MO/W.PET/CERES
1 CREAM (GRAM) TOPICAL
Qty: 0 | Refills: 0 | DISCHARGE
Start: 2018-09-10

## 2018-09-10 RX ADMIN — TIOTROPIUM BROMIDE 1 CAPSULE(S): 18 CAPSULE ORAL; RESPIRATORY (INHALATION) at 16:08

## 2018-09-10 RX ADMIN — Medication 5 MILLIGRAM(S): at 17:35

## 2018-09-10 RX ADMIN — PIPERACILLIN AND TAZOBACTAM 25 GRAM(S): 4; .5 INJECTION, POWDER, LYOPHILIZED, FOR SOLUTION INTRAVENOUS at 05:23

## 2018-09-10 RX ADMIN — AMIODARONE HYDROCHLORIDE 200 MILLIGRAM(S): 400 TABLET ORAL at 21:08

## 2018-09-10 RX ADMIN — Medication 25 MILLIGRAM(S): at 17:34

## 2018-09-10 RX ADMIN — Medication 81 MILLIGRAM(S): at 17:35

## 2018-09-10 RX ADMIN — ATORVASTATIN CALCIUM 40 MILLIGRAM(S): 80 TABLET, FILM COATED ORAL at 21:08

## 2018-09-10 RX ADMIN — HEPARIN SODIUM 5000 UNIT(S): 5000 INJECTION INTRAVENOUS; SUBCUTANEOUS at 05:23

## 2018-09-10 RX ADMIN — TAMSULOSIN HYDROCHLORIDE 0.4 MILLIGRAM(S): 0.4 CAPSULE ORAL at 21:08

## 2018-09-10 RX ADMIN — HEPARIN SODIUM 5000 UNIT(S): 5000 INJECTION INTRAVENOUS; SUBCUTANEOUS at 16:07

## 2018-09-10 RX ADMIN — ALBUTEROL 2 PUFF(S): 90 AEROSOL, METERED ORAL at 21:11

## 2018-09-10 RX ADMIN — PIPERACILLIN AND TAZOBACTAM 25 GRAM(S): 4; .5 INJECTION, POWDER, LYOPHILIZED, FOR SOLUTION INTRAVENOUS at 17:35

## 2018-09-10 RX ADMIN — ERYTHROPOIETIN 2000 UNIT(S): 10000 INJECTION, SOLUTION INTRAVENOUS; SUBCUTANEOUS at 14:29

## 2018-09-10 RX ADMIN — ALBUTEROL 2 PUFF(S): 90 AEROSOL, METERED ORAL at 10:12

## 2018-09-10 RX ADMIN — HEPARIN SODIUM 5000 UNIT(S): 5000 INJECTION INTRAVENOUS; SUBCUTANEOUS at 21:08

## 2018-09-10 RX ADMIN — ALBUTEROL 2 PUFF(S): 90 AEROSOL, METERED ORAL at 16:08

## 2018-09-10 RX ADMIN — Medication 1 TABLET(S): at 17:34

## 2018-09-10 RX ADMIN — CLOPIDOGREL BISULFATE 75 MILLIGRAM(S): 75 TABLET, FILM COATED ORAL at 17:34

## 2018-09-10 RX ADMIN — ALBUTEROL 2 PUFF(S): 90 AEROSOL, METERED ORAL at 05:23

## 2018-09-10 NOTE — PROGRESS NOTE ADULT - PROBLEM SELECTOR PLAN 3
in the setting of CHF exacerbation, now clinically appears stable.   UF with HD today  Low salt diet and fluid restriction

## 2018-09-10 NOTE — PROGRESS NOTE ADULT - SUBJECTIVE AND OBJECTIVE BOX
[facility] DIVISION OF KIDNEY DISEASES AND HYPERTENSION -- HEMODIALYSIS NOTE  --------------------------------------------------------------------------------  Chief Complaint: ESRD/Ongoing hemodialysis requirement    24 hour events/subjective: seen on maintenance HD today        PAST HISTORY  --------------------------------------------------------------------------------  No significant changes to PMH, PSH, FHx, SHx, unless otherwise noted    ALLERGIES & MEDICATIONS  --------------------------------------------------------------------------------  Allergies    No Known Allergies    Intolerances      Standing Inpatient Medications  acetaminophen  IVPB. 1000 milliGRAM(s) IV Intermittent once  ALBUTerol    90 MICROgram(s) HFA Inhaler 2 Puff(s) Inhalation every 6 hours  amiodarone    Tablet 200 milliGRAM(s) Oral at bedtime  aspirin  chewable 81 milliGRAM(s) Oral daily  atorvastatin 40 milliGRAM(s) Oral at bedtime  busPIRone 5 milliGRAM(s) Oral two times a day  clopidogrel Tablet 75 milliGRAM(s) Oral daily  dextrose 5%. 1000 milliLiter(s) IV Continuous <Continuous>  dextrose 50% Injectable 12.5 Gram(s) IV Push once  dextrose 50% Injectable 25 Gram(s) IV Push once  dextrose 50% Injectable 25 Gram(s) IV Push once  epoetin georgie Injectable 2000 Unit(s) IV Push <User Schedule>  heparin  Injectable 5000 Unit(s) SubCutaneous every 8 hours  insulin lispro (HumaLOG) corrective regimen sliding scale   SubCutaneous three times a day before meals  insulin lispro (HumaLOG) corrective regimen sliding scale   SubCutaneous at bedtime  metoprolol tartrate 25 milliGRAM(s) Oral two times a day  multivitamin 1 Tablet(s) Oral daily  piperacillin/tazobactam IVPB. 3.375 Gram(s) IV Intermittent every 12 hours  potassium chloride   Solution 20 milliEquivalent(s) Oral once  tamsulosin 0.4 milliGRAM(s) Oral at bedtime  tiotropium 18 MICROgram(s) Capsule 1 Capsule(s) Inhalation daily    PRN Inpatient Medications  acetaminophen   Tablet .. 650 milliGRAM(s) Oral every 6 hours PRN  dextrose 40% Gel 15 Gram(s) Oral once PRN  glucagon  Injectable 1 milliGRAM(s) IntraMuscular once PRN  melatonin 3 milliGRAM(s) Oral at bedtime PRN      REVIEW OF SYSTEMS  --------------------------------------------------------------------------------  Constitutional: [ ] Fever [ ] Chills [ ] Fatigue [ ] Weight change   HEENT: [ ] Blurred vision [ ] Eye Pain [ ] Headache [ ] Runny nose [ ] Sore Throat   Respiratory: [ ] Cough [ ] Wheezing [ ] Shortness of breath  Cardiovascular: [ ] Chest Pain [ ] Palpitations [ ] CHAMPION [ ] PND [ ] Orthopnea  Gastrointestinal: [ ] Abdominal Pain [ ] Diarrhea [ ] Constipation [ ] Hemorrhoids [ ] Nausea [ ] Vomiting  Genitourinary: [ ] Nocturia [ ] Dysuria [ ] Incontinence  Extremities: [ ] Swelling [ ] Joint Pain  Neurologic: [ ] Focal deficit [ ] Paresthesias [ ] Syncope  Lymphatic: [ ] Swelling [ ] Lymphadenopathy   Skin: [ ] Rash [ ] Ecchymoses [ ] Wounds [ ] Lesions  Psychiatry: [ ] Depression [ ] Suicidal/Homicidal Ideation [ ] Anxiety [ ] Sleep Disturbances  [ ] 10 point review of systems is otherwise negative except as mentioned above              [ ]Unable to obtain due to   All other systems were reviewed and are negative, except as noted.      VITALS/PHYSICAL EXAM  --------------------------------------------------------------------------------  T(C): 36.7 (09-10-18 @ 11:55), Max: 37.1 (09-10-18 @ 04:55)  HR: 66 (09-10-18 @ 11:55) (62 - 75)  BP: 128/80 (09-10-18 @ 11:55) (110/60 - 136/62)  RR: 18 (09-10-18 @ 11:55) (17 - 18)  SpO2: 97% (09-10-18 @ 08:55) (96% - 100%)  Wt(kg): --        09-10-18 @ 07:01  -  09-10-18 @ 14:08  --------------------------------------------------------  IN: 118 mL / OUT: 0 mL / NET: 118 mL      Physical Exam:  	Gen: NAD, well-appearing  	HEENT: on room air  	Pulm: CTA B/L  	CV: normal S1S2; no rub  	Abd: soft                      Back : No sacral edema  	: No jacob  	LE: no edema  	Skin: Warm, without rashes  	Vascular access:     LABS/STUDIES  --------------------------------------------------------------------------------              9.1    5.11  >-----------<  241      [09-09-18 @ 13:30]              28.6     142  |  94  |  49  ----------------------------<  147      [09-09-18 @ 13:30]  3.2   |  27  |  6.06        Ca     9.0     [09-09-18 @ 13:30]      Mg     2.3     [09-09-18 @ 13:30]      Phos  5.9     [09-09-18 @ 13:30]    TPro  6.7  /  Alb  3.3  /  TBili  < 0.2  /  DBili  x   /  AST  19  /  ALT  18  /  AlkPhos  72  [09-09-18 @ 13:30]          Iron 41, TIBC 157, %sat --      [06-20-18 @ 06:00]  Ferritin 2578      [06-20-18 @ 06:00]  PTH 76.54 (Ca --)      [06-20-18 @ 06:00]   --  HbA1c 6.4      [07-12-18 @ 07:59]  TSH 8.78      [09-04-18 @ 06:12]  Lipid: chol 106, TG 97, HDL 33, LDL 54      [09-04-18 @ 06:12]    HBsAg NEGATIVE      [09-03-18 @ 10:16]

## 2018-09-10 NOTE — PROGRESS NOTE ADULT - SUBJECTIVE AND OBJECTIVE BOX
Reid Martinez MD  Interventional Cardiology  [place] Office : [address] [place] [address]. [address]  Tel:   [place] Office : [address] [facility] [place] [address]  Tel: [phone]  Cell : [phone]    Subjective : Pt lying in bed comfortable, not in distress, denies any chest pain or SOB, getting dialysis , c/o B/L leg pain   	  MEDICATIONS:  amiodarone    Tablet 200 milliGRAM(s) Oral at bedtime  aspirin  chewable 81 milliGRAM(s) Oral daily  clopidogrel Tablet 75 milliGRAM(s) Oral daily  heparin  Injectable 5000 Unit(s) SubCutaneous every 8 hours  metoprolol tartrate 25 milliGRAM(s) Oral two times a day  tamsulosin 0.4 milliGRAM(s) Oral at bedtime    piperacillin/tazobactam IVPB. 3.375 Gram(s) IV Intermittent every 12 hours    ALBUTerol    90 MICROgram(s) HFA Inhaler 2 Puff(s) Inhalation every 6 hours  tiotropium 18 MICROgram(s) Capsule 1 Capsule(s) Inhalation daily    acetaminophen   Tablet .. 650 milliGRAM(s) Oral every 6 hours PRN  acetaminophen  IVPB. 1000 milliGRAM(s) IV Intermittent once  busPIRone 5 milliGRAM(s) Oral two times a day  melatonin 3 milliGRAM(s) Oral at bedtime PRN      atorvastatin 40 milliGRAM(s) Oral at bedtime  dextrose 40% Gel 15 Gram(s) Oral once PRN  dextrose 50% Injectable 12.5 Gram(s) IV Push once  dextrose 50% Injectable 25 Gram(s) IV Push once  dextrose 50% Injectable 25 Gram(s) IV Push once  glucagon  Injectable 1 milliGRAM(s) IntraMuscular once PRN  insulin lispro (HumaLOG) corrective regimen sliding scale   SubCutaneous three times a day before meals  insulin lispro (HumaLOG) corrective regimen sliding scale   SubCutaneous at bedtime    dextrose 5%. 1000 milliLiter(s) IV Continuous <Continuous>  epoetin georgie Injectable 2000 Unit(s) IV Push <User Schedule>  multivitamin 1 Tablet(s) Oral daily      PHYSICAL EXAM:  T(C): 36.7 (09-10-18 @ 21:23), Max: 37.1 (09-10-18 @ 04:55)  HR: 69 (09-10-18 @ 21:23) (63 - 79)  BP: 140/66 (09-10-18 @ 21:23) (120/62 - 141/67)  RR: 17 (09-10-18 @ 21:23) (17 - 18)  SpO2: 98% (09-10-18 @ 21:23) (96% - 100%)  Wt(kg): --  I&O's Summary    10 Sep 2018 07:01  -  11 Sep 2018 00:23  --------------------------------------------------------  IN: 618 mL / OUT: 2000 mL / NET: -1382 mL          Appearance: Normal	  HEENT:   Normal oral mucosa, PERRL, EOMI	  Cardiovascular: Normal S1 S2, No JVD, No murmurs, No edema  Respiratory: Lungs clear to auscultation	  Gastrointestinal:  Soft, Non-tender, + BS	  Extremities: Normal range of motion, No clubbing, cyanosis or edema                                    9.1    5.11  )-----------( 241      ( 09 Sep 2018 13:30 )             28.6     09-09    142  |  94<L>  |  49<H>  ----------------------------<  147<H>  3.2<L>   |  27  |  6.06<H>    Ca    9.0      09 Sep 2018 13:30  Phos  5.9     09-09  Mg     2.3     09-09    TPro  6.7  /  Alb  3.3  /  TBili  < 0.2<L>  /  DBili  x   /  AST  19  /  ALT  18  /  AlkPhos  72  09-09    proBNP:   Lipid Profile:   HgA1c:   TSH:

## 2018-09-10 NOTE — PROGRESS NOTE ADULT - PROBLEM SELECTOR PLAN 1
Initially presented with worsening SOB. Likely 2/2 CHF exacerbation vs fluid overload from ESRD vs PNA; now improved following BiPAP and Lasix 40mg IVP x1. - afebrile, no leukocytosis, and vitals wnl  - developed productive cough during this admission  - CXR neg for focal consolidation  - BCx NGTD  - c/w zosyn for possible aspiration pneumonia (day 6)  - failed speech and swallow eval; cineesophagogram showed no aspiration   - dysphagia diet-honey consistency  - Nutrition consult placed

## 2018-09-10 NOTE — PROGRESS NOTE ADULT - SUBJECTIVE AND OBJECTIVE BOX
Ramiro Perkins PGY-1   [facility] Pager # [phone]  NS Pager # [phone]      Patient is a 73y old  Male who presents with a chief complaint of CHF exacerbation (09 Sep 2018 11:11)      SUBJECTIVE: No acute events overnight. Patient seen and examined at bedside.    REVIEW OF SYSTEMS:  Unable to obtain      MEDICATIONS  (STANDING):  acetaminophen  IVPB. 1000 milliGRAM(s) IV Intermittent once  ALBUTerol    90 MICROgram(s) HFA Inhaler 2 Puff(s) Inhalation every 6 hours  amiodarone    Tablet 200 milliGRAM(s) Oral at bedtime  aspirin  chewable 81 milliGRAM(s) Oral daily  atorvastatin 40 milliGRAM(s) Oral at bedtime  busPIRone 5 milliGRAM(s) Oral two times a day  clopidogrel Tablet 75 milliGRAM(s) Oral daily  dextrose 5%. 1000 milliLiter(s) (50 mL/Hr) IV Continuous <Continuous>  dextrose 50% Injectable 12.5 Gram(s) IV Push once  dextrose 50% Injectable 25 Gram(s) IV Push once  dextrose 50% Injectable 25 Gram(s) IV Push once  epoetin georgie Injectable 2000 Unit(s) IV Push <User Schedule>  heparin  Injectable 5000 Unit(s) SubCutaneous every 8 hours  insulin lispro (HumaLOG) corrective regimen sliding scale   SubCutaneous three times a day before meals  insulin lispro (HumaLOG) corrective regimen sliding scale   SubCutaneous at bedtime  metoprolol tartrate 25 milliGRAM(s) Oral two times a day  multivitamin 1 Tablet(s) Oral daily  piperacillin/tazobactam IVPB. 3.375 Gram(s) IV Intermittent every 12 hours  potassium chloride   Solution 20 milliEquivalent(s) Oral once  tamsulosin 0.4 milliGRAM(s) Oral at bedtime  tiotropium 18 MICROgram(s) Capsule 1 Capsule(s) Inhalation daily    MEDICATIONS  (PRN):  acetaminophen   Tablet .. 650 milliGRAM(s) Oral every 6 hours PRN Temp greater or equal to 38C (100.4F), Mild Pain (1 - 3), Moderate Pain (4 - 6)  dextrose 40% Gel 15 Gram(s) Oral once PRN Blood Glucose LESS THAN 70 milliGRAM(s)/deciliter  glucagon  Injectable 1 milliGRAM(s) IntraMuscular once PRN Glucose LESS THAN 70 milligrams/deciliter  melatonin 3 milliGRAM(s) Oral at bedtime PRN Insomnia        Objective:    Vitals: Vital Signs Last 24 Hrs  T(C): 37.1 (09-10-18 @ 04:55), Max: 37.1 (09-10-18 @ 04:55)  T(F): 98.7 (09-10-18 @ 04:55), Max: 98.7 (09-10-18 @ 04:55)  HR: 72 (09-10-18 @ 04:55) (62 - 72)  BP: 136/62 (09-10-18 @ 04:55) (108/55 - 136/62)  BP(mean): --  RR: 17 (09-10-18 @ 04:55) (17 - 17)  SpO2: 100% (09-10-18 @ 04:55) (97% - 100%)            I&O's Summary    08 Sep 2018 07:01  -  09 Sep 2018 07:00  --------------------------------------------------------  IN: 100 mL / OUT: 0 mL / NET: 100 mL        PHYSICAL EXAM:  GENERAL: NAD  HEAD:  Atraumatic, Normocephalic  CHEST/LUNG: Clear to auscultation bilaterally; No rales or wheezing  HEART: Regular rate and rhythm; No murmurs, rubs, or gallops  ABDOMEN: Soft, nontender, nondistended  EXTREMITIES:  No clubbing, cyanosis, or edema  LYMPH: No lymphadenopathy noted  SKIN: No rashes or lesions  NERVOUS SYSTEM:  Alert & Oriented X1    LABS:  09-09    142  |  94<L>  |  49<H>  ----------------------------<  147<H>  3.2<L>   |  27  |  6.06<H>  09-08    141  |  96<L>  |  27<H>  ----------------------------<  96  3.4<L>   |  27  |  4.45<H>    Ca    9.0      09 Sep 2018 13:30  Ca    9.3      08 Sep 2018 05:40  Phos  5.9     09-09  Mg     2.3     09-09    TPro  6.7  /  Alb  3.3  /  TBili  < 0.2<L>  /  DBili  x   /  AST  19  /  ALT  18  /  AlkPhos  72  09-09                                            9.1    5.11  )-----------( 241      ( 09 Sep 2018 13:30 )             28.6                         9.0    4.18  )-----------( 277      ( 08 Sep 2018 05:40 )             29.0     CAPILLARY BLOOD GLUCOSE      POCT Blood Glucose.: 91 mg/dL (10 Sep 2018 05:35)  POCT Blood Glucose.: 141 mg/dL (09 Sep 2018 22:08)  POCT Blood Glucose.: 215 mg/dL (09 Sep 2018 17:09)  POCT Blood Glucose.: 145 mg/dL (09 Sep 2018 12:05)  POCT Blood Glucose.: 104 mg/dL (09 Sep 2018 07:31)    RADIOLOGY:  Imaging Personally Reviewed: YES      Consultants involved in case: YES  Consultant(s) Notes Reviewed:  YES  Care Discussed with Consultants/Other Providers       Ramiro Perkins, PGY-1

## 2018-09-10 NOTE — PROGRESS NOTE ADULT - ASSESSMENT
73M h/o ESRD (Dialysis M/W/F), Bipolar, BPH, COPD, HTN, CAD presents from [place] for CHAMPION and desaturation likely 2/2 acute decompensated heart failure vs acute pulmonary edema from underlying ESRD vs infectious etiology. Now symptomatically improved following BiPAP and Lasix IVP 40mg x1. Concerns for aspiration pneumonia given cough productive of greenish sputum. Empirically started on zosyn. Patient has remained afebrile w/o leukocytosis. Vitals stable

## 2018-09-10 NOTE — PROGRESS NOTE ADULT - PROBLEM SELECTOR PLAN 1
Pt. with ESRD on HD TIW (MWF). Last HD was on 9/7/18 via [facility]. Seen on maintenance HD today. Tolerating HD well. Plan for next HD on Wednesday.  Monitor BP and labs

## 2018-09-11 VITALS
OXYGEN SATURATION: 100 % | HEART RATE: 63 BPM | DIASTOLIC BLOOD PRESSURE: 58 MMHG | TEMPERATURE: 99 F | RESPIRATION RATE: 18 BRPM | SYSTOLIC BLOOD PRESSURE: 123 MMHG

## 2018-09-11 LAB
BUN SERPL-MCNC: 32 MG/DL — HIGH (ref 7–23)
CALCIUM SERPL-MCNC: 9.3 MG/DL — SIGNIFICANT CHANGE UP (ref 8.4–10.5)
CHLORIDE SERPL-SCNC: 96 MMOL/L — LOW (ref 98–107)
CO2 SERPL-SCNC: 27 MMOL/L — SIGNIFICANT CHANGE UP (ref 22–31)
CREAT SERPL-MCNC: 4.36 MG/DL — HIGH (ref 0.5–1.3)
GLUCOSE SERPL-MCNC: 93 MG/DL — SIGNIFICANT CHANGE UP (ref 70–99)
HCT VFR BLD CALC: 32.5 % — LOW (ref 39–50)
HGB BLD-MCNC: 9.8 G/DL — LOW (ref 13–17)
MAGNESIUM SERPL-MCNC: 2.3 MG/DL — SIGNIFICANT CHANGE UP (ref 1.6–2.6)
MCHC RBC-ENTMCNC: 28.7 PG — SIGNIFICANT CHANGE UP (ref 27–34)
MCHC RBC-ENTMCNC: 30.2 % — LOW (ref 32–36)
MCV RBC AUTO: 95 FL — SIGNIFICANT CHANGE UP (ref 80–100)
NRBC # FLD: 0 — SIGNIFICANT CHANGE UP
PHOSPHATE SERPL-MCNC: 4.1 MG/DL — SIGNIFICANT CHANGE UP (ref 2.5–4.5)
PLATELET # BLD AUTO: 251 K/UL — SIGNIFICANT CHANGE UP (ref 150–400)
PMV BLD: 10.3 FL — SIGNIFICANT CHANGE UP (ref 7–13)
POTASSIUM SERPL-MCNC: 3.6 MMOL/L — SIGNIFICANT CHANGE UP (ref 3.5–5.3)
POTASSIUM SERPL-SCNC: 3.6 MMOL/L — SIGNIFICANT CHANGE UP (ref 3.5–5.3)
RBC # BLD: 3.42 M/UL — LOW (ref 4.2–5.8)
RBC # FLD: 14.5 % — SIGNIFICANT CHANGE UP (ref 10.3–14.5)
SODIUM SERPL-SCNC: 141 MMOL/L — SIGNIFICANT CHANGE UP (ref 135–145)
WBC # BLD: 5.45 K/UL — SIGNIFICANT CHANGE UP (ref 3.8–10.5)
WBC # FLD AUTO: 5.45 K/UL — SIGNIFICANT CHANGE UP (ref 3.8–10.5)

## 2018-09-11 RX ADMIN — TIOTROPIUM BROMIDE 1 CAPSULE(S): 18 CAPSULE ORAL; RESPIRATORY (INHALATION) at 10:18

## 2018-09-11 RX ADMIN — ALBUTEROL 2 PUFF(S): 90 AEROSOL, METERED ORAL at 10:19

## 2018-09-11 RX ADMIN — Medication 25 MILLIGRAM(S): at 05:31

## 2018-09-11 RX ADMIN — HEPARIN SODIUM 5000 UNIT(S): 5000 INJECTION INTRAVENOUS; SUBCUTANEOUS at 05:31

## 2018-09-11 RX ADMIN — Medication 5 MILLIGRAM(S): at 05:31

## 2018-09-11 RX ADMIN — ALBUTEROL 2 PUFF(S): 90 AEROSOL, METERED ORAL at 05:31

## 2018-09-11 RX ADMIN — HEPARIN SODIUM 5000 UNIT(S): 5000 INJECTION INTRAVENOUS; SUBCUTANEOUS at 14:01

## 2018-09-11 RX ADMIN — PIPERACILLIN AND TAZOBACTAM 25 GRAM(S): 4; .5 INJECTION, POWDER, LYOPHILIZED, FOR SOLUTION INTRAVENOUS at 05:31

## 2018-09-11 NOTE — PROGRESS NOTE ADULT - PROBLEM SELECTOR PLAN 9
- check A1c  - moderate corrective ISS + FSG + diabetic-DASH-renal diet w/ fluid restriction

## 2018-09-11 NOTE — PROGRESS NOTE ADULT - SUBJECTIVE AND OBJECTIVE BOX
Reid Martinez MD  Interventional Cardiology  [place] Office : [address] [place] [address] [place]. [address]  Tel:   [place] Office : [address] [facility] [place] [address]  Tel: [phone]  Cell : [phone]    Subjective : Pt lying in bed comfortable, not in distress, denies any chest pain or SOB, B/L LE pain improved  	  MEDICATIONS:  amiodarone    Tablet 200 milliGRAM(s) Oral at bedtime  aspirin  chewable 81 milliGRAM(s) Oral daily  clopidogrel Tablet 75 milliGRAM(s) Oral daily  heparin  Injectable 5000 Unit(s) SubCutaneous every 8 hours  metoprolol tartrate 25 milliGRAM(s) Oral two times a day  tamsulosin 0.4 milliGRAM(s) Oral at bedtime    piperacillin/tazobactam IVPB. 3.375 Gram(s) IV Intermittent every 12 hours    ALBUTerol    90 MICROgram(s) HFA Inhaler 2 Puff(s) Inhalation every 6 hours  tiotropium 18 MICROgram(s) Capsule 1 Capsule(s) Inhalation daily    acetaminophen   Tablet .. 650 milliGRAM(s) Oral every 6 hours PRN  acetaminophen  IVPB. 1000 milliGRAM(s) IV Intermittent once  busPIRone 5 milliGRAM(s) Oral two times a day  melatonin 3 milliGRAM(s) Oral at bedtime PRN      atorvastatin 40 milliGRAM(s) Oral at bedtime  dextrose 40% Gel 15 Gram(s) Oral once PRN  dextrose 50% Injectable 12.5 Gram(s) IV Push once  dextrose 50% Injectable 25 Gram(s) IV Push once  dextrose 50% Injectable 25 Gram(s) IV Push once  glucagon  Injectable 1 milliGRAM(s) IntraMuscular once PRN  insulin lispro (HumaLOG) corrective regimen sliding scale   SubCutaneous three times a day before meals  insulin lispro (HumaLOG) corrective regimen sliding scale   SubCutaneous at bedtime    dextrose 5%. 1000 milliLiter(s) IV Continuous <Continuous>  epoetin georgie Injectable 2000 Unit(s) IV Push <User Schedule>  multivitamin 1 Tablet(s) Oral daily      PHYSICAL EXAM:  T(C): 37.3 (09-11-18 @ 12:42), Max: 37.3 (09-11-18 @ 12:42)  HR: 63 (09-11-18 @ 12:42) (62 - 79)  BP: 123/58 (09-11-18 @ 12:42) (123/58 - 141/67)  RR: 18 (09-11-18 @ 12:42) (17 - 18)  SpO2: 100% (09-11-18 @ 12:42) (96% - 100%)  Wt(kg): --  I&O's Summary    10 Sep 2018 07:01  -  11 Sep 2018 07:00  --------------------------------------------------------  IN: 618 mL / OUT: 2000 mL / NET: -1382 mL          Appearance: Normal	  HEENT:   Normal oral mucosa, PERRL, EOMI	  Cardiovascular: Normal S1 S2, No JVD, No murmurs, No edema  Respiratory: Lungs clear to auscultation	  Gastrointestinal:  Soft, Non-tender, + BS	  Extremities: Normal range of motion, No clubbing, cyanosis or edema                                  9.8    5.45  )-----------( 251      ( 11 Sep 2018 07:10 )             32.5     09-11    141  |  96<L>  |  32<H>  ----------------------------<  93  3.6   |  27  |  4.36<H>    Ca    9.3      11 Sep 2018 07:10  Phos  4.1     09-11  Mg     2.3     09-11    TPro  6.7  /  Alb  3.3  /  TBili  < 0.2<L>  /  DBili  x   /  AST  19  /  ALT  18  /  AlkPhos  72  09-09    proBNP:   Lipid Profile:   HgA1c:   TSH:

## 2018-09-11 NOTE — PROGRESS NOTE ADULT - PROBLEM SELECTOR PROBLEM 2
CKD (chronic kidney disease)
Anemia
Anemia in chronic kidney disease, on chronic dialysis
CKD (chronic kidney disease)

## 2018-09-11 NOTE — PROGRESS NOTE ADULT - REASON FOR ADMISSION
CHF exacerbation

## 2018-09-11 NOTE — PROGRESS NOTE ADULT - PROVIDER SPECIALTY LIST ADULT
Cardiology
Internal Medicine
Nephrology
Internal Medicine

## 2018-09-11 NOTE — PROGRESS NOTE ADULT - SUBJECTIVE AND OBJECTIVE BOX
Ramiro Perkins PGY-1   [facility] Pager # [phone]  NS Pager # [phone]      Patient is a 73y old  Male who presents with a chief complaint of CHF exacerbation (11 Sep 2018 13:06)      SUBJECTIVE: No acute events overnight.    REVIEW OF SYSTEMS:  CONSTITUTIONAL: No fever, weight loss, or fatigue  RESPIRATORY: No cough or shortness of breath  CARDIOVASCULAR: No chest pain, palpitations, dizziness, or leg swelling  GASTROINTESTINAL: No abdominal or epigastric pain. No nausea, vomiting, or hematemesis; No diarrhea or constipation. No melena or hematochezia.  GENITOURINARY: No dysuria  NEUROLOGICAL: No headaches  SKIN: No itching or lesions       MEDICATIONS  (STANDING):  acetaminophen  IVPB. 1000 milliGRAM(s) IV Intermittent once  ALBUTerol    90 MICROgram(s) HFA Inhaler 2 Puff(s) Inhalation every 6 hours  amiodarone    Tablet 200 milliGRAM(s) Oral at bedtime  aspirin  chewable 81 milliGRAM(s) Oral daily  atorvastatin 40 milliGRAM(s) Oral at bedtime  busPIRone 5 milliGRAM(s) Oral two times a day  clopidogrel Tablet 75 milliGRAM(s) Oral daily  dextrose 5%. 1000 milliLiter(s) (50 mL/Hr) IV Continuous <Continuous>  dextrose 50% Injectable 12.5 Gram(s) IV Push once  dextrose 50% Injectable 25 Gram(s) IV Push once  dextrose 50% Injectable 25 Gram(s) IV Push once  epoetin georgie Injectable 2000 Unit(s) IV Push <User Schedule>  heparin  Injectable 5000 Unit(s) SubCutaneous every 8 hours  insulin lispro (HumaLOG) corrective regimen sliding scale   SubCutaneous three times a day before meals  insulin lispro (HumaLOG) corrective regimen sliding scale   SubCutaneous at bedtime  metoprolol tartrate 25 milliGRAM(s) Oral two times a day  multivitamin 1 Tablet(s) Oral daily  piperacillin/tazobactam IVPB. 3.375 Gram(s) IV Intermittent every 12 hours  tamsulosin 0.4 milliGRAM(s) Oral at bedtime  tiotropium 18 MICROgram(s) Capsule 1 Capsule(s) Inhalation daily    MEDICATIONS  (PRN):  acetaminophen   Tablet .. 650 milliGRAM(s) Oral every 6 hours PRN Temp greater or equal to 38C (100.4F), Mild Pain (1 - 3), Moderate Pain (4 - 6)  dextrose 40% Gel 15 Gram(s) Oral once PRN Blood Glucose LESS THAN 70 milliGRAM(s)/deciliter  glucagon  Injectable 1 milliGRAM(s) IntraMuscular once PRN Glucose LESS THAN 70 milligrams/deciliter  melatonin 3 milliGRAM(s) Oral at bedtime PRN Insomnia        Objective:    Vitals: Vital Signs Last 24 Hrs  T(C): 37.3 (09-11-18 @ 12:42), Max: 37.3 (09-11-18 @ 12:42)  T(F): 99.1 (09-11-18 @ 12:42), Max: 99.1 (09-11-18 @ 12:42)  HR: 63 (09-11-18 @ 12:42) (62 - 79)  BP: 123/58 (09-11-18 @ 12:42) (123/58 - 141/67)  BP(mean): --  RR: 18 (09-11-18 @ 12:42) (17 - 18)  SpO2: 100% (09-11-18 @ 12:42) (96% - 100%)            I&O's Summary    10 Sep 2018 07:01  -  11 Sep 2018 07:00  --------------------------------------------------------  IN: 618 mL / OUT: 2000 mL / NET: -1382 mL        PHYSICAL EXAM:  GENERAL: NAD  HEAD:  Atraumatic, Normocephalic  CHEST/LUNG: Clear to auscultation bilaterally; No rales or wheezing  HEART: Regular rate and rhythm; No murmurs, rubs, or gallops  ABDOMEN: Soft, nontender, nondistended  EXTREMITIES:  No clubbing, cyanosis, or edema  LYMPH: No lymphadenopathy noted  SKIN: No rashes or lesions  NERVOUS SYSTEM:  Alert & Oriented X2    LABS:  09-11    141  |  96<L>  |  32<H>  ----------------------------<  93  3.6   |  27  |  4.36<H>  09-09    142  |  94<L>  |  49<H>  ----------------------------<  147<H>  3.2<L>   |  27  |  6.06<H>    Ca    9.3      11 Sep 2018 07:10  Ca    9.0      09 Sep 2018 13:30  Phos  4.1     09-11  Mg     2.3     09-11    TPro  6.7  /  Alb  3.3  /  TBili  < 0.2<L>  /  DBili  x   /  AST  19  /  ALT  18  /  AlkPhos  72  09-09                                            9.8    5.45  )-----------( 251      ( 11 Sep 2018 07:10 )             32.5                         9.1    5.11  )-----------( 241      ( 09 Sep 2018 13:30 )             28.6     CAPILLARY BLOOD GLUCOSE      POCT Blood Glucose.: 123 mg/dL (11 Sep 2018 12:01)  POCT Blood Glucose.: 92 mg/dL (11 Sep 2018 07:49)  POCT Blood Glucose.: 147 mg/dL (10 Sep 2018 21:20)  POCT Blood Glucose.: 128 mg/dL (10 Sep 2018 17:01)    RADIOLOGY:  Imaging Personally Reviewed: YES      Consultants involved in case: YES  Consultant(s) Notes Reviewed:  YES  Care Discussed with Consultants/Other Providers       Ramiro Perkins, PGY-1

## 2018-09-11 NOTE — PROGRESS NOTE ADULT - PROBLEM SELECTOR PLAN 1
Initially presented with worsening SOB. Likely 2/2 CHF exacerbation vs fluid overload from ESRD vs PNA; now improved following BiPAP and Lasix 40mg IVP x1. - afebrile, no leukocytosis, and vitals wnl  - developed productive cough during this admission  - CXR neg for focal consolidation  - BCx NGTD  - c/w zosyn for possible aspiration pneumonia (day 6)  - failed speech and swallow eval; cineesophagogram showed no aspiration   - dysphagia diet-honey consistency

## 2018-09-11 NOTE — PROGRESS NOTE ADULT - ASSESSMENT
73M h/o ESRD (Dialysis M/W/F), Bipolar, BPH, COPD, HTN, CAD presents from [place] for CHAMPION and desaturation likely 2/2 acute decompensated heart failure vs acute pulmonary edema from underlying ESRD vs infectious etiology. Now symptomatically improved following BiPAP and Lasix IVP 40mg x1. Concerns for aspiration pneumonia given cough productive of greenish sputum. Empirically started on zosyn. Patient has remained afebrile w/o leukocytosis. Vitals stable. Now improved and ready for discharge.

## 2018-10-18 ENCOUNTER — INPATIENT (INPATIENT)
Facility: HOSPITAL | Age: 73
LOS: 4 days | Discharge: NURSING, REHAB OR LONG-TERM CARE | End: 2018-10-23
Attending: HOSPITALIST | Admitting: HOSPITALIST
Payer: MEDICARE

## 2018-10-18 VITALS
HEART RATE: 90 BPM | DIASTOLIC BLOOD PRESSURE: 67 MMHG | OXYGEN SATURATION: 100 % | SYSTOLIC BLOOD PRESSURE: 141 MMHG | RESPIRATION RATE: 24 BRPM | TEMPERATURE: 98 F

## 2018-10-18 DIAGNOSIS — Z98.890 OTHER SPECIFIED POSTPROCEDURAL STATES: Chronic | ICD-10-CM

## 2018-10-18 DIAGNOSIS — J81.1 CHRONIC PULMONARY EDEMA: ICD-10-CM

## 2018-10-18 DIAGNOSIS — E87.79 OTHER FLUID OVERLOAD: ICD-10-CM

## 2018-10-18 DIAGNOSIS — N18.6 END STAGE RENAL DISEASE: ICD-10-CM

## 2018-10-18 LAB
ALBUMIN SERPL ELPH-MCNC: 3.5 G/DL — SIGNIFICANT CHANGE UP (ref 3.3–5)
ALP SERPL-CCNC: 87 U/L — SIGNIFICANT CHANGE UP (ref 40–120)
ALT FLD-CCNC: 10 U/L — SIGNIFICANT CHANGE UP (ref 4–41)
APTT BLD: 36.8 SEC — SIGNIFICANT CHANGE UP (ref 27.5–37.4)
AST SERPL-CCNC: 10 U/L — SIGNIFICANT CHANGE UP (ref 4–40)
B PERT DNA SPEC QL NAA+PROBE: SIGNIFICANT CHANGE UP
BASE EXCESS BLDV CALC-SCNC: 6 MMOL/L — SIGNIFICANT CHANGE UP
BASOPHILS # BLD AUTO: 0.02 K/UL — SIGNIFICANT CHANGE UP (ref 0–0.2)
BASOPHILS NFR BLD AUTO: 0.2 % — SIGNIFICANT CHANGE UP (ref 0–2)
BILIRUB SERPL-MCNC: 0.2 MG/DL — SIGNIFICANT CHANGE UP (ref 0.2–1.2)
BLOOD GAS VENOUS - CREATININE: 2.95 MG/DL — HIGH (ref 0.5–1.3)
BUN SERPL-MCNC: 25 MG/DL — HIGH (ref 7–23)
C PNEUM DNA SPEC QL NAA+PROBE: NOT DETECTED — SIGNIFICANT CHANGE UP
CALCIUM SERPL-MCNC: 8.9 MG/DL — SIGNIFICANT CHANGE UP (ref 8.4–10.5)
CHLORIDE BLDV-SCNC: 102 MMOL/L — SIGNIFICANT CHANGE UP (ref 96–108)
CHLORIDE SERPL-SCNC: 98 MMOL/L — SIGNIFICANT CHANGE UP (ref 98–107)
CO2 SERPL-SCNC: 28 MMOL/L — SIGNIFICANT CHANGE UP (ref 22–31)
CREAT SERPL-MCNC: 2.93 MG/DL — HIGH (ref 0.5–1.3)
EOSINOPHIL # BLD AUTO: 0.03 K/UL — SIGNIFICANT CHANGE UP (ref 0–0.5)
EOSINOPHIL NFR BLD AUTO: 0.3 % — SIGNIFICANT CHANGE UP (ref 0–6)
FLUAV H1 2009 PAND RNA SPEC QL NAA+PROBE: NOT DETECTED — SIGNIFICANT CHANGE UP
FLUAV H1 RNA SPEC QL NAA+PROBE: NOT DETECTED — SIGNIFICANT CHANGE UP
FLUAV H3 RNA SPEC QL NAA+PROBE: NOT DETECTED — SIGNIFICANT CHANGE UP
FLUAV SUBTYP SPEC NAA+PROBE: SIGNIFICANT CHANGE UP
FLUBV RNA SPEC QL NAA+PROBE: NOT DETECTED — SIGNIFICANT CHANGE UP
GAS PNL BLDV: 136 MMOL/L — SIGNIFICANT CHANGE UP (ref 136–146)
GLUCOSE BLDC GLUCOMTR-MCNC: 174 MG/DL — HIGH (ref 70–99)
GLUCOSE BLDV-MCNC: 226 — HIGH (ref 70–99)
GLUCOSE SERPL-MCNC: 232 MG/DL — HIGH (ref 70–99)
HADV DNA SPEC QL NAA+PROBE: NOT DETECTED — SIGNIFICANT CHANGE UP
HBV SURFACE AG SER-ACNC: NEGATIVE — SIGNIFICANT CHANGE UP
HCO3 BLDV-SCNC: 28 MMOL/L — HIGH (ref 20–27)
HCOV 229E RNA SPEC QL NAA+PROBE: NOT DETECTED — SIGNIFICANT CHANGE UP
HCOV HKU1 RNA SPEC QL NAA+PROBE: NOT DETECTED — SIGNIFICANT CHANGE UP
HCOV NL63 RNA SPEC QL NAA+PROBE: NOT DETECTED — SIGNIFICANT CHANGE UP
HCOV OC43 RNA SPEC QL NAA+PROBE: NOT DETECTED — SIGNIFICANT CHANGE UP
HCT VFR BLD CALC: 28.7 % — LOW (ref 39–50)
HCT VFR BLDV CALC: 27.2 % — LOW (ref 39–51)
HGB BLD-MCNC: 8.2 G/DL — LOW (ref 13–17)
HGB BLDV-MCNC: 8.8 G/DL — LOW (ref 13–17)
HMPV RNA SPEC QL NAA+PROBE: NOT DETECTED — SIGNIFICANT CHANGE UP
HPIV1 RNA SPEC QL NAA+PROBE: NOT DETECTED — SIGNIFICANT CHANGE UP
HPIV2 RNA SPEC QL NAA+PROBE: NOT DETECTED — SIGNIFICANT CHANGE UP
HPIV3 RNA SPEC QL NAA+PROBE: NOT DETECTED — SIGNIFICANT CHANGE UP
HPIV4 RNA SPEC QL NAA+PROBE: NOT DETECTED — SIGNIFICANT CHANGE UP
IMM GRANULOCYTES # BLD AUTO: 0.04 # — SIGNIFICANT CHANGE UP
IMM GRANULOCYTES NFR BLD AUTO: 0.4 % — SIGNIFICANT CHANGE UP (ref 0–1.5)
INR BLD: 1.07 — SIGNIFICANT CHANGE UP (ref 0.88–1.17)
LACTATE BLDV-MCNC: 3.6 MMOL/L — HIGH (ref 0.5–2)
LYMPHOCYTES # BLD AUTO: 0.64 K/UL — LOW (ref 1–3.3)
LYMPHOCYTES # BLD AUTO: 6.5 % — LOW (ref 13–44)
M PNEUMO DNA SPEC QL NAA+PROBE: NOT DETECTED — SIGNIFICANT CHANGE UP
MAGNESIUM SERPL-MCNC: 2.2 MG/DL — SIGNIFICANT CHANGE UP (ref 1.6–2.6)
MCHC RBC-ENTMCNC: 27.5 PG — SIGNIFICANT CHANGE UP (ref 27–34)
MCHC RBC-ENTMCNC: 28.6 % — LOW (ref 32–36)
MCV RBC AUTO: 96.3 FL — SIGNIFICANT CHANGE UP (ref 80–100)
MONOCYTES # BLD AUTO: 0.47 K/UL — SIGNIFICANT CHANGE UP (ref 0–0.9)
MONOCYTES NFR BLD AUTO: 4.8 % — SIGNIFICANT CHANGE UP (ref 2–14)
NEUTROPHILS # BLD AUTO: 8.59 K/UL — HIGH (ref 1.8–7.4)
NEUTROPHILS NFR BLD AUTO: 87.8 % — HIGH (ref 43–77)
NRBC # FLD: 0 — SIGNIFICANT CHANGE UP
NT-PROBNP SERPL-SCNC: SIGNIFICANT CHANGE UP PG/ML
PCO2 BLDV: 71 MMHG — HIGH (ref 41–51)
PH BLDV: 7.28 PH — LOW (ref 7.32–7.43)
PHOSPHATE SERPL-MCNC: 3.3 MG/DL — SIGNIFICANT CHANGE UP (ref 2.5–4.5)
PLATELET # BLD AUTO: 262 K/UL — SIGNIFICANT CHANGE UP (ref 150–400)
PMV BLD: 9.3 FL — SIGNIFICANT CHANGE UP (ref 7–13)
PO2 BLDV: 31 MMHG — LOW (ref 35–40)
POTASSIUM BLDV-SCNC: 4 MMOL/L — SIGNIFICANT CHANGE UP (ref 3.4–4.5)
POTASSIUM SERPL-MCNC: 4 MMOL/L — SIGNIFICANT CHANGE UP (ref 3.5–5.3)
POTASSIUM SERPL-SCNC: 4 MMOL/L — SIGNIFICANT CHANGE UP (ref 3.5–5.3)
PROT SERPL-MCNC: 6.6 G/DL — SIGNIFICANT CHANGE UP (ref 6–8.3)
PROTHROM AB SERPL-ACNC: 11.9 SEC — SIGNIFICANT CHANGE UP (ref 9.8–13.1)
RBC # BLD: 2.98 M/UL — LOW (ref 4.2–5.8)
RBC # FLD: 14.9 % — HIGH (ref 10.3–14.5)
RSV RNA SPEC QL NAA+PROBE: NOT DETECTED — SIGNIFICANT CHANGE UP
RV+EV RNA SPEC QL NAA+PROBE: NOT DETECTED — SIGNIFICANT CHANGE UP
SAO2 % BLDV: 42.2 % — LOW (ref 60–85)
SODIUM SERPL-SCNC: 142 MMOL/L — SIGNIFICANT CHANGE UP (ref 135–145)
TROPONIN T, HIGH SENSITIVITY: 156 NG/L — CRITICAL HIGH (ref ?–14)
WBC # BLD: 9.79 K/UL — SIGNIFICANT CHANGE UP (ref 3.8–10.5)
WBC # FLD AUTO: 9.79 K/UL — SIGNIFICANT CHANGE UP (ref 3.8–10.5)

## 2018-10-18 PROCEDURE — 74176 CT ABD & PELVIS W/O CONTRAST: CPT | Mod: 26

## 2018-10-18 PROCEDURE — 99223 1ST HOSP IP/OBS HIGH 75: CPT | Mod: GC

## 2018-10-18 PROCEDURE — 71045 X-RAY EXAM CHEST 1 VIEW: CPT | Mod: 26

## 2018-10-18 PROCEDURE — 71250 CT THORAX DX C-: CPT | Mod: 26

## 2018-10-18 RX ORDER — FERROUS SULFATE 325(65) MG
325 TABLET ORAL DAILY
Qty: 0 | Refills: 0 | Status: DISCONTINUED | OUTPATIENT
Start: 2018-10-18 | End: 2018-10-23

## 2018-10-18 RX ORDER — PIPERACILLIN AND TAZOBACTAM 4; .5 G/20ML; G/20ML
3.38 INJECTION, POWDER, LYOPHILIZED, FOR SOLUTION INTRAVENOUS EVERY 12 HOURS
Qty: 0 | Refills: 0 | Status: DISCONTINUED | OUTPATIENT
Start: 2018-10-18 | End: 2018-10-19

## 2018-10-18 RX ORDER — CLOPIDOGREL BISULFATE 75 MG/1
75 TABLET, FILM COATED ORAL DAILY
Qty: 0 | Refills: 0 | Status: DISCONTINUED | OUTPATIENT
Start: 2018-10-18 | End: 2018-10-23

## 2018-10-18 RX ORDER — IPRATROPIUM/ALBUTEROL SULFATE 18-103MCG
3 AEROSOL WITH ADAPTER (GRAM) INHALATION ONCE
Qty: 0 | Refills: 0 | Status: COMPLETED | OUTPATIENT
Start: 2018-10-18 | End: 2018-10-18

## 2018-10-18 RX ORDER — LANOLIN ALCOHOL/MO/W.PET/CERES
3 CREAM (GRAM) TOPICAL AT BEDTIME
Qty: 0 | Refills: 0 | Status: DISCONTINUED | OUTPATIENT
Start: 2018-10-18 | End: 2018-10-23

## 2018-10-18 RX ORDER — INSULIN LISPRO 100/ML
VIAL (ML) SUBCUTANEOUS
Qty: 0 | Refills: 0 | Status: DISCONTINUED | OUTPATIENT
Start: 2018-10-18 | End: 2018-10-23

## 2018-10-18 RX ORDER — IPRATROPIUM/ALBUTEROL SULFATE 18-103MCG
3 AEROSOL WITH ADAPTER (GRAM) INHALATION EVERY 6 HOURS
Qty: 0 | Refills: 0 | Status: DISCONTINUED | OUTPATIENT
Start: 2018-10-18 | End: 2018-10-23

## 2018-10-18 RX ORDER — INSULIN LISPRO 100/ML
VIAL (ML) SUBCUTANEOUS AT BEDTIME
Qty: 0 | Refills: 0 | Status: DISCONTINUED | OUTPATIENT
Start: 2018-10-18 | End: 2018-10-23

## 2018-10-18 RX ORDER — CEFEPIME 1 G/1
2000 INJECTION, POWDER, FOR SOLUTION INTRAMUSCULAR; INTRAVENOUS ONCE
Qty: 0 | Refills: 0 | Status: COMPLETED | OUTPATIENT
Start: 2018-10-18 | End: 2018-10-18

## 2018-10-18 RX ORDER — METOPROLOL TARTRATE 50 MG
25 TABLET ORAL
Qty: 0 | Refills: 0 | Status: DISCONTINUED | OUTPATIENT
Start: 2018-10-18 | End: 2018-10-23

## 2018-10-18 RX ORDER — CIPROFLOXACIN LACTATE 400MG/40ML
400 VIAL (ML) INTRAVENOUS ONCE
Qty: 0 | Refills: 0 | Status: COMPLETED | OUTPATIENT
Start: 2018-10-18 | End: 2018-10-18

## 2018-10-18 RX ORDER — SEVELAMER CARBONATE 2400 MG/1
800 POWDER, FOR SUSPENSION ORAL THREE TIMES A DAY
Qty: 0 | Refills: 0 | Status: DISCONTINUED | OUTPATIENT
Start: 2018-10-18 | End: 2018-10-20

## 2018-10-18 RX ORDER — DOXAZOSIN MESYLATE 4 MG
1 TABLET ORAL AT BEDTIME
Qty: 0 | Refills: 0 | Status: DISCONTINUED | OUTPATIENT
Start: 2018-10-18 | End: 2018-10-23

## 2018-10-18 RX ORDER — ASPIRIN/CALCIUM CARB/MAGNESIUM 324 MG
81 TABLET ORAL DAILY
Qty: 0 | Refills: 0 | Status: DISCONTINUED | OUTPATIENT
Start: 2018-10-18 | End: 2018-10-23

## 2018-10-18 RX ORDER — HALOPERIDOL DECANOATE 100 MG/ML
2 INJECTION INTRAMUSCULAR ONCE
Qty: 0 | Refills: 0 | Status: COMPLETED | OUTPATIENT
Start: 2018-10-18 | End: 2018-10-18

## 2018-10-18 RX ORDER — VANCOMYCIN HCL 1 G
1000 VIAL (EA) INTRAVENOUS ONCE
Qty: 0 | Refills: 0 | Status: COMPLETED | OUTPATIENT
Start: 2018-10-18 | End: 2018-10-18

## 2018-10-18 RX ORDER — PIPERACILLIN AND TAZOBACTAM 4; .5 G/20ML; G/20ML
3.38 INJECTION, POWDER, LYOPHILIZED, FOR SOLUTION INTRAVENOUS ONCE
Qty: 0 | Refills: 0 | Status: COMPLETED | OUTPATIENT
Start: 2018-10-18 | End: 2018-10-18

## 2018-10-18 RX ORDER — ATORVASTATIN CALCIUM 80 MG/1
40 TABLET, FILM COATED ORAL AT BEDTIME
Qty: 0 | Refills: 0 | Status: DISCONTINUED | OUTPATIENT
Start: 2018-10-18 | End: 2018-10-23

## 2018-10-18 RX ORDER — AMIODARONE HYDROCHLORIDE 400 MG/1
200 TABLET ORAL DAILY
Qty: 0 | Refills: 0 | Status: DISCONTINUED | OUTPATIENT
Start: 2018-10-18 | End: 2018-10-23

## 2018-10-18 RX ORDER — HEPARIN SODIUM 5000 [USP'U]/ML
5000 INJECTION INTRAVENOUS; SUBCUTANEOUS EVERY 8 HOURS
Qty: 0 | Refills: 0 | Status: DISCONTINUED | OUTPATIENT
Start: 2018-10-18 | End: 2018-10-23

## 2018-10-18 RX ADMIN — PIPERACILLIN AND TAZOBACTAM 200 GRAM(S): 4; .5 INJECTION, POWDER, LYOPHILIZED, FOR SOLUTION INTRAVENOUS at 22:38

## 2018-10-18 RX ADMIN — Medication 5 MILLIGRAM(S): at 22:05

## 2018-10-18 RX ADMIN — SEVELAMER CARBONATE 800 MILLIGRAM(S): 2400 POWDER, FOR SUSPENSION ORAL at 21:40

## 2018-10-18 RX ADMIN — Medication 3 MILLILITER(S): at 13:42

## 2018-10-18 RX ADMIN — Medication 81 MILLIGRAM(S): at 21:40

## 2018-10-18 RX ADMIN — CEFEPIME 100 MILLIGRAM(S): 1 INJECTION, POWDER, FOR SOLUTION INTRAMUSCULAR; INTRAVENOUS at 13:26

## 2018-10-18 RX ADMIN — CLOPIDOGREL BISULFATE 75 MILLIGRAM(S): 75 TABLET, FILM COATED ORAL at 21:40

## 2018-10-18 RX ADMIN — Medication 125 MILLIGRAM(S): at 13:26

## 2018-10-18 RX ADMIN — AMIODARONE HYDROCHLORIDE 200 MILLIGRAM(S): 400 TABLET ORAL at 21:40

## 2018-10-18 RX ADMIN — Medication 200 MILLIGRAM(S): at 14:45

## 2018-10-18 RX ADMIN — Medication 3 MILLILITER(S): at 13:35

## 2018-10-18 RX ADMIN — Medication 3 MILLILITER(S): at 14:19

## 2018-10-18 RX ADMIN — HALOPERIDOL DECANOATE 2 MILLIGRAM(S): 100 INJECTION INTRAMUSCULAR at 22:04

## 2018-10-18 RX ADMIN — Medication 400 MILLIGRAM(S): at 16:16

## 2018-10-18 RX ADMIN — Medication 1 MILLIGRAM(S): at 22:06

## 2018-10-18 RX ADMIN — HEPARIN SODIUM 5000 UNIT(S): 5000 INJECTION INTRAVENOUS; SUBCUTANEOUS at 21:40

## 2018-10-18 RX ADMIN — Medication 250 MILLIGRAM(S): at 16:46

## 2018-10-18 RX ADMIN — Medication 25 MILLIGRAM(S): at 21:40

## 2018-10-18 RX ADMIN — Medication 325 MILLIGRAM(S): at 21:40

## 2018-10-18 RX ADMIN — CEFEPIME 2000 MILLIGRAM(S): 1 INJECTION, POWDER, FOR SOLUTION INTRAMUSCULAR; INTRAVENOUS at 14:00

## 2018-10-18 RX ADMIN — ATORVASTATIN CALCIUM 40 MILLIGRAM(S): 80 TABLET, FILM COATED ORAL at 22:05

## 2018-10-18 NOTE — H&P ADULT - HISTORY OF PRESENT ILLNESS
74 y/o Georgian speaking M with T2DM, HTN, CAD s/p 12-14 stents placed in 1990's, and BABAR x 5 placed in Sept 2017, CHF with EF 20%, AF, CVA , ESRD on HD (MWF) COPD, bipolar disorder, dementia admitted for evaluation of SOB. Patient on BIPAP and unable to obtain his Nephrology was consulted for management of ESRD and Hypervolemia. Pt's last hemodialysis was yesteday. Pt currently seen in the ER and complaints of significant trouble breathing. A CT chest reveals large b/l pleural effusions with possible consolidations. Plan for Urgent HD today to assist with hypervolemia. 72 y/o Belizean speaking M with T2DM, HTN, CAD s/p 12-14 stents placed in 1990's, and BABAR x 5 placed in Sept 2017, CHF with EF 20%, AF (not a/c 2/2 GIB), CVA , ESRD on HD (MWF) COPD, bipolar disorder, dementia admitted for evaluation of SOB. Patient on BIPAP and history obtained from daughter at bedside. Reports patient has frequent admissions for SOB 2/2 volume overload. Patient has been SOB the past week. Last HD Nephrology was consulted for management of ESRD and Hypervolemia. Pt's last hemodialysis was yesteday. Pt currently seen in the ER and complaints of significant trouble breathing. A CT chest reveals large b/l pleural effusions with possible consolidations. Plan for Urgent HD today to assist with hypervolemia. 74 y/o Salvadorean speaking M with T2DM, HTN, CAD s/p 12-14 stents placed in 1990's, and BABAR x 5 placed in Sept 2017, CHF with EF 20%, AF (not a/c 2/2 GIB), CVA , ESRD on HD (MWF) COPD, bipolar disorder, dementia admitted for evaluation of SOB. Patient on BIPAP and history obtained from daughter at bedside. Reports patient has frequent admissions for SOB 2/2 volume overload. Patient has been SOB the past week. Last HD per daughter yesterday. Recieves midodrine prior to dailysis. Patient has not had fevers, productive cough, , abn pain, N/V/D/C. Makes a small amount of urine daily without dysuria.  C/o pleuritic CP and no palpitations.     In ED initially with inc WOB, tachypneic and placed on BIPAP. A CT chest reveals large b/l pleural effusions with possible consolidations. S/p vanc, cefepime, cipro, duoneb. Plan for Urgent HD today to assist with hypervolemia.

## 2018-10-18 NOTE — CONSULT NOTE ADULT - PROBLEM SELECTOR RECOMMENDATION 2
Pt with ESRD on HD. Pt's last outpatient dialysis was yesterday. Plan for urgent HD today due to volume overload. Monitor BP during HD.

## 2018-10-18 NOTE — ED PROVIDER NOTE - ATTENDING CONTRIBUTION TO CARE
[place]  pt arrives with SOB reported fever   [place] SOB yesterday which improved after dialysis  pt denies CP  ABD pain    exam  pt with b/l wheezing  card S1S2  no gallop hear d  benign abd     CXR hazy on rt (?pl effusion tracking)  with fevr  CT  noncon of chest ordered  treated for COPD  renal called for possible repeat dialysis  bipap ordered   pt has required midodrine for BP support with dialysis so nitrates not considered for fluid overload tx [place]  pt arrives with SOB reported fever   [place] SOB yesterday which improved after dialysis  pt denies CP  ABD pain    exam  pt with b/l wheezing  card S1S2  no gallop hear d  benign abd     CXR hazy on rt (?pl effusion tracking)  with fever  CT  noncon of chest ordered  no infiltrate but mod pulm edema  treated for COPD  renal called for possible repeat dialysis  bipap ordered   pt has required midodrine for BP support with dialysis so nitrates not considered for fluid overload tx

## 2018-10-18 NOTE — ED PROVIDER NOTE - MEDICAL DECISION MAKING DETAILS
72 y/o M with PMH of CAD(s/p 12-14 stents placed in 1990's, and BABAR x 5 placed in Sept 2017), ESRD(on HD M/W/F), Bipolar disorder, COPD, BPH, CVA, DM, HLD, HTN, Afib(off coumadin 2/2 to bleed last admission), CHF(with EF of 20%) BIBEMS from [place] for SOB, wheezing, fever x 2days.  - Labs, Duonebs, CXR, abx

## 2018-10-18 NOTE — ED ADULT TRIAGE NOTE - CHIEF COMPLAINT QUOTE
from [facility] for sob since 830am. 2 duonebs given by ems. pt appears in distress . 02 sat 100% at this time. pmh htn, dm, bipolar, asthma, bph, esrd , afib denies pain/discomfort from [facility] for sob since 830am. 2 albuterols  given by ems. pt appears in distress . 02 sat 100% at this time. pmh htn, dm, bipolar, asthma, bph, esrd , afib

## 2018-10-18 NOTE — ED PROVIDER NOTE - OBJECTIVE STATEMENT
72 y/o M with PMH of CAD(s/p 12-14 stents placed in 1990's, and BABAR x 5 placed in Sept 2017), ESRD(on HD M/W/F), Bipolar disorder, COPD, BPH, CVA, DM, HLD, HTN, Afib(off coumadin 2/2 to bleed last admission), CHF(with EF of 20%) BIBEMS from [place] for SOB and fever x 2days. No other symptoms. Pt was fully dialyzed yesterday. As per EMS, Pt was noted to be wheezing, got Albuterol x3 en route

## 2018-10-18 NOTE — CONSULT NOTE ADULT - ATTENDING COMMENTS
Patient seen and examined.  Fluid overload  ESRD  HD again today for further UF to prevent further shortness of breath

## 2018-10-18 NOTE — H&P ADULT - NSHPLABSRESULTS_GEN_ALL_CORE
8.2    9.79  )-----------( 262      ( 18 Oct 2018 13:02 )             28.7     10-18    142  |  98  |  25<H>  ----------------------------<  232<H>  4.0   |  28  |  2.93<H>    Ca    8.9      18 Oct 2018 13:00  Phos  3.3     10-18  Mg     2.2     10-18    TPro  6.6  /  Alb  3.5  /  TBili  0.2  /  DBili  x   /  AST  10  /  ALT  10  /  AlkPhos  87  10-18        PT/INR - ( 18 Oct 2018 13:02 )   PT: 11.9 SEC;   INR: 1.07       PTT - ( 18 Oct 2018 13:02 )  PTT:36.8 SEC    Lactate Trend    CAPILLARY BLOOD GLUCOSE    POCT Blood Glucose.: 258 mg/dL (18 Oct 2018 16:02)    Cultures: blood and urine cultures sent, RVP, sputum and legionella pending     Radiology:  < from: CT Chest No Cont (10.18.18 @ 15:35) >    FINDINGS:    CHEST:     LUNGS AND LARGE AIRWAYS/PLEURA: Diffuse groundglass opacities and   interlobular septal thickening. Moderate right and left pleural effusions   with associated bibasilar compressive atelectasis. Patent central airways.  VESSELS: Coronary and aortic atherosclerosis  HEART: Cardiomegaly. No pericardial effusion.  MEDIASTINUM AND QUITA:No lymphadenopathy.  CHEST WALL AND LOWER NECK: Within normal limits.    ABDOMEN AND PELVIS:    LIVER: Within normal limits.  BILE DUCTS: Normal caliber.  GALLBLADDER: Tiny gallstones.  SPLEEN: Within normal limits.  PANCREAS: Atrophic pancreas  ADRENALS: Within normal limits.  KIDNEYS/URETERS: Atrophic kidneys bilaterally. No hydroureter or   hydronephrosis. No renal calculi.    BLADDER: Stable bladder calculus measuring 1.9 cm.     REPRODUCTIVE ORGANS: Prostate within normal limits.    BOWEL: Focal narrowing of the sigmoid colon, new since 6/22/2018, and   likely represents peristalsis. No bowel obstruction. Appendix is normal.  PERITONEUM: No ascites.  VESSELS:  Within normal limits.  RETROPERITONEUM: No lymphadenopathy.    ABDOMINAL WALL:Within normal limits.  BONES: Spondylosis.    IMPRESSION:   Moderate pulmonary edema with bilateral pleural effusions.    Stable bladder calculus.    < end of copied text > 8.2    9.79  )-----------( 262      ( 18 Oct 2018 13:02 )             28.7     10-18    142  |  98  |  25<H>  ----------------------------<  232<H>  4.0   |  28  |  2.93<H>    Ca    8.9      18 Oct 2018 13:00  Phos  3.3     10-18  Mg     2.2     10-18    TPro  6.6  /  Alb  3.5  /  TBili  0.2  /  DBili  x   /  AST  10  /  ALT  10  /  AlkPhos  87  10-18        PT/INR - ( 18 Oct 2018 13:02 )   PT: 11.9 SEC;   INR: 1.07       PTT - ( 18 Oct 2018 13:02 )  PTT:36.8 SEC    Lactate Trend    CAPILLARY BLOOD GLUCOSE    POCT Blood Glucose.: 258 mg/dL (18 Oct 2018 16:02)    Cultures: blood and urine cultures sent, RVP, sputum and legionella pending     Radiology:  < from: CT Chest No Cont (10.18.18 @ 15:35) >    FINDINGS:    CHEST:     LUNGS AND LARGE AIRWAYS/PLEURA: Diffuse groundglass opacities and   interlobular septal thickening. Moderate right and left pleural effusions   with associated bibasilar compressive atelectasis. Patent central airways.  VESSELS: Coronary and aortic atherosclerosis  HEART: Cardiomegaly. No pericardial effusion.  MEDIASTINUM AND QUITA:No lymphadenopathy.  CHEST WALL AND LOWER NECK: Within normal limits.    ABDOMEN AND PELVIS:    LIVER: Within normal limits.  BILE DUCTS: Normal caliber.  GALLBLADDER: Tiny gallstones.  SPLEEN: Within normal limits.  PANCREAS: Atrophic pancreas  ADRENALS: Within normal limits.  KIDNEYS/URETERS: Atrophic kidneys bilaterally. No hydroureter or   hydronephrosis. No renal calculi.    BLADDER: Stable bladder calculus measuring 1.9 cm.     REPRODUCTIVE ORGANS: Prostate within normal limits.    BOWEL: Focal narrowing of the sigmoid colon, new since 6/22/2018, and   likely represents peristalsis. No bowel obstruction. Appendix is normal.  PERITONEUM: No ascites.  VESSELS:  Within normal limits.  RETROPERITONEUM: No lymphadenopathy.    ABDOMINAL WALL:Within normal limits.  BONES: Spondylosis.    IMPRESSION:   Moderate pulmonary edema with bilateral pleural effusions.    Stable bladder calculus.    < end of copied text >    EKG: NSR

## 2018-10-18 NOTE — ED ADULT NURSE NOTE - NSIMPLEMENTINTERV_GEN_ALL_ED
Implemented All Fall with Harm Risk Interventions:  [place] to call system. Call bell, personal items and telephone within reach. Instruct patient to call for assistance. Room bathroom lighting operational. Non-slip footwear when patient is off stretcher. Physically safe environment: no spills, clutter or unnecessary equipment. Stretcher in lowest position, wheels locked, appropriate side rails in place. Provide visual cue, wrist band, yellow gown, etc. Monitor gait and stability. Monitor for mental status changes and reorient to person, place, and time. Review medications for side effects contributing to fall risk. Reinforce activity limits and safety measures with patient and family. Provide visual clues: red socks.

## 2018-10-18 NOTE — CONSULT NOTE ADULT - SUBJECTIVE AND OBJECTIVE BOX
[facility] DIVISION OF KIDNEY DISEASES AND HYPERTENSION -- INITIAL CONSULT NOTE  --------------------------------------------------------------------------------    HPI: 72 y/o [place] speaking M with CAD s/p 12-14 stents placed in 1990's, and BABAR x 5 placed in Sept 2017, CHF with EF 20%, DM, HTN, HLD, AF on coumadin, CVA , ESRD on HD (MWF) COPD, bipolar disorder, dementia admitted for evaluation of SOB. Nephrology was consulted for management of ESRD and Hypervolemia. Pt's last hemodialysis was yesteday. Pt currently seen in the ER and complaints of significant trouble breathing. A CT chest reveals large b/l pleural effusions with possible consolidations. Plan for Urgent HD today to assist with hypervolemia.     PAST HISTORY  --------------------------------------------------------------------------------  PAST MEDICAL & SURGICAL HISTORY:  CVA (cerebral vascular accident)  COPD (chronic obstructive pulmonary disease)  BPH (benign prostatic hyperplasia)  Bipolar affective disorder  CKD (chronic kidney disease)  Pancreatitis  Hypertension  Dyslipidemia  Diabetes mellitus  Coronary artery disease  HLD (hyperlipidemia)  Anemia  Acute renal failure  CKD (chronic kidney disease)  COPD (chronic obstructive pulmonary disease)  Fainting  Cardiac arrhythmia  Dizziness  Hydronephrosis  Cholecystitis  Bipolar 1 disorder  DM (diabetes mellitus)  HTN (hypertension)  Lumbar radiculopathy  Left heart failure  Reflux esophagitis  Coronary atherosclerosis  History of cardiac catheterization  No significant past surgical history    FAMILY HISTORY:  No pertinent family history in first degree relatives    PAST SOCIAL HISTORY:    ALLERGIES & MEDICATIONS  --------------------------------------------------------------------------------  Allergies    No Known Allergies    Intolerances      Standing Inpatient Medications    PRN Inpatient Medications      REVIEW OF SYSTEMS  --------------------------------------------------------------------------------  Gen: No weakness  Skin: No rashes  Head/Eyes/Ears/Mouth: No headache  Respiratory: + dyspnea  CV: No chest pain, PND, orthopnea  GI: No abdominal pain, diarrhea  : No increased frequency  MSK: No edema  Neuro: No dizziness/lightheadedness    All other systems were reviewed and are negative, except as noted.    VITALS/PHYSICAL EXAM  --------------------------------------------------------------------------------  T(C): 36.4 (10-18-18 @ 16:06), Max: 36.7 (10-18-18 @ 12:09)  HR: 92 (10-18-18 @ 16:26) (90 - 98)  BP: 142/59 (10-18-18 @ 16:06) (141/67 - 142/59)  RR: 27 (10-18-18 @ 16:06) (24 - 27)  SpO2: 100% (10-18-18 @ 16:26) (98% - 100%)  Wt(kg): --    Physical Exam:  	Gen: Pt in resp. distress  	Pulm: CTA B/L  	CV: RRR, S1S2; no rub  	Abd: +BS, soft, nontender/nondistended  	UE: Warm, no asterixis  	LE: Warm, no edema  	Psych: Agitated   	Skin: Warm, without rashes  	Vascular access: Right UE AVF present; thrill and bruit heard.     LABS/STUDIES  --------------------------------------------------------------------------------              8.2    9.79  >-----------<  262      [10-18-18 @ 13:02]              28.7     142  |  98  |  25  ----------------------------<  232      [10-18-18 @ 13:00]  4.0   |  28  |  2.93        Ca     8.9     [10-18-18 @ 13:00]      Mg     2.2     [10-18-18 @ 13:00]      Phos  3.3     [10-18-18 @ 13:00]    TPro  6.6  /  Alb  3.5  /  TBili  0.2  /  DBili  x   /  AST  10  /  ALT  10  /  AlkPhos  87  [10-18-18 @ 13:00]    PT/INR: PT 11.9 , INR 1.07       [10-18-18 @ 13:02]  PTT: 36.8       [10-18-18 @ 13:02]    Creatinine Trend:  SCr 2.93 [10-18 @ 13:00]    Urinalysis - [09-07-17 @ 19:24]      Color Yellow / Appearance Clear / SG 1.020 / pH 5.0      Gluc Negative / Ketone Trace  / Bili Negative / Urobili Negative       Blood Large / Protein 100 / Leuk Est Small / Nitrite Negative      RBC >50 / WBC 6-10 / Hyaline  / Gran  / Sq Epi  / Non Sq Epi Few / Bacteria Moderate    Iron 41, TIBC 157, %sat --      [06-20-18 @ 06:00]  Ferritin 2578      [06-20-18 @ 06:00]  PTH 76.54 (Ca --)      [06-20-18 @ 06:00]   --  HbA1c 6.4      [07-12-18 @ 07:59]  TSH 8.78      [09-04-18 @ 06:12]  Lipid: chol 106, TG 97, HDL 33, LDL 54      [09-04-18 @ 06:12]    HBsAb <3.0      [06-19-18 @ 17:05]  HBsAg NEGATIVE      [09-03-18 @ 10:16]  HBcAb Nonreactive      [06-19-18 @ 17:05]  HCV 0.35, Nonreactive Hepatitis C AB  S/CO Ratio                        Interpretation  < 1.0                                     Non-Reactive  1.0 - 4.9                           Weakly-Reactive  > 5.0                                 Reactive  Non-Reactive: Aperson with a non-reactive HCV antibody  result is considered uninfected.  No further action is  needed unless recent infection is suspected.  In these  cases, consider repeat testing later to detect  seroconversion..  Weakly-Reactive: HCV antibody test is abnormal, HCV RNA  Qualitative test will follow.  Reactive: HCV antibody test is abnormal, HCV RNA  Qualitative test will follow.  Note: HCV antibody testing is performed on the Abbott   system.      [06-19-18 @ 17:05] [facility] DIVISION OF KIDNEY DISEASES AND HYPERTENSION -- INITIAL CONSULT NOTE  --------------------------------------------------------------------------------    HPI: 74 y/o Greenlandic speaking M with CAD s/p 12-14 stents placed in 1990's, and BABAR x 5 placed in Sept 2017, CHF with EF 20%, DM, HTN, HLD, AF on coumadin, CVA , ESRD on HD (MW) COPD, bipolar disorder, dementia admitted for evaluation of SOB. Nephrology was consulted for management of ESRD and Hypervolemia. Pt's last hemodialysis was [place]ay. Pt currently seen in the ER and complaints of significant trouble breathing. A CT chest reveals large b/l pleural effusions with possible consolidations. Plan for Urgent HD today to assist with hypervolemia.     PAST HISTORY  --------------------------------------------------------------------------------  PAST MEDICAL & SURGICAL HISTORY:  CVA (cerebral vascular accident)  COPD (chronic obstructive pulmonary disease)  BPH (benign prostatic hyperplasia)  Bipolar affective disorder  CKD (chronic kidney disease)  Pancreatitis  Hypertension  Dyslipidemia  Diabetes mellitus  Coronary artery disease  HLD (hyperlipidemia)  Anemia  Acute renal failure  CKD (chronic kidney disease)  COPD (chronic obstructive pulmonary disease)  Fainting  Cardiac arrhythmia  Dizziness  Hydronephrosis  Cholecystitis  Bipolar 1 disorder  DM (diabetes mellitus)  HTN (hypertension)  Lumbar radiculopathy  Left heart failure  Reflux esophagitis  Coronary atherosclerosis  History of cardiac catheterization  No significant past surgical history    FAMILY HISTORY:  No pertinent family history in first degree relatives    PAST SOCIAL HISTORY:  No cigarette use, IVDA, alcohol use, lives [facility]    ALLERGIES & MEDICATIONS  --------------------------------------------------------------------------------  Allergies    No Known Allergies    Intolerances      Standing Inpatient Medications    PRN Inpatient Medications      REVIEW OF SYSTEMS  --------------------------------------------------------------------------------  Gen: No weakness  Skin: No rashes  Head/Eyes/Ears/Mouth: No headache  Respiratory: + dyspnea  CV: No chest pain, PND, orthopnea  GI: No abdominal pain, diarrhea  : No increased frequency  MSK: No edema  Neuro: No dizziness/lightheadedness    All other systems were reviewed and are negative, except as noted.    VITALS/PHYSICAL EXAM  --------------------------------------------------------------------------------  T(C): 36.4 (10-18-18 @ 16:06), Max: 36.7 (10-18-18 @ 12:09)  HR: 92 (10-18-18 @ 16:26) (90 - 98)  BP: 142/59 (10-18-18 @ 16:06) (141/67 - 142/59)  RR: 27 (10-18-18 @ 16:06) (24 - 27)  SpO2: 100% (10-18-18 @ 16:26) (98% - 100%)  Wt(kg): --    Physical Exam:  	Gen: Pt in resp. distress  	Pulm: CTA B/L  	CV: RRR, S1S2; no rub  	Abd: +BS, soft, nontender/nondistended  	UE: Warm, no asterixis  	LE: Warm, no edema  	Psych: Agitated   	Skin: Warm, without rashes  	Vascular access: Right UE AVF present; thrill and bruit heard.     LABS/STUDIES  --------------------------------------------------------------------------------              8.2    9.79  >-----------<  262      [10-18-18 @ 13:02]              28.7     142  |  98  |  25  ----------------------------<  232      [10-18-18 @ 13:00]  4.0   |  28  |  2.93        Ca     8.9     [10-18-18 @ 13:00]      Mg     2.2     [10-18-18 @ 13:00]      Phos  3.3     [10-18-18 @ 13:00]    TPro  6.6  /  Alb  3.5  /  TBili  0.2  /  DBili  x   /  AST  10  /  ALT  10  /  AlkPhos  87  [10-18-18 @ 13:00]    PT/INR: PT 11.9 , INR 1.07       [10-18-18 @ 13:02]  PTT: 36.8       [10-18-18 @ 13:02]    Creatinine Trend:  SCr 2.93 [10-18 @ 13:00]    Urinalysis - [09-07-17 @ 19:24]      Color Yellow / Appearance Clear / SG 1.020 / pH 5.0      Gluc Negative / Ketone Trace  / Bili Negative / Urobili Negative       Blood Large / Protein 100 / Leuk Est Small / Nitrite Negative      RBC >50 / WBC 6-10 / Hyaline  / Gran  / Sq Epi  / Non Sq Epi Few / Bacteria Moderate    Iron 41, TIBC 157, %sat --      [06-20-18 @ 06:00]  Ferritin 2578      [06-20-18 @ 06:00]  PTH 76.54 (Ca --)      [06-20-18 @ 06:00]   --  HbA1c 6.4      [07-12-18 @ 07:59]  TSH 8.78      [09-04-18 @ 06:12]  Lipid: chol 106, TG 97, HDL 33, LDL 54      [09-04-18 @ 06:12]    HBsAb <3.0      [06-19-18 @ 17:05]  HBsAg NEGATIVE      [09-03-18 @ 10:16]  HBcAb Nonreactive      [06-19-18 @ 17:05]  HCV 0.35, Nonreactive Hepatitis C AB  S/CO Ratio                        Interpretation  < 1.0                                     Non-Reactive  1.0 - 4.9                           Weakly-Reactive  > 5.0                                 Reactive  Non-Reactive: Aperson with a non-reactive HCV antibody  result is considered uninfected.  No further action is  needed unless recent infection is suspected.  In these  cases, consider repeat testing later to detect  seroconversion..  Weakly-Reactive: HCV antibody test is abnormal, HCV RNA  Qualitative test will follow.  Reactive: HCV antibody test is abnormal, HCV RNA  Qualitative test will follow.  Note: HCV antibody testing is performed on the [employer]   system.      [06-19-18 @ 17:05]

## 2018-10-18 NOTE — H&P ADULT - NSHPREVIEWOFSYSTEMS_GEN_ALL_CORE
REVIEW OF SYSTEMS:    CONSTITUTIONAL: No weakness, fevers or chills  EYES/ENT: No visual changes;  no throat pain   NECK: No pain or stiffness  RESPIRATORY: (+) chronic cough, no wheezing, hemoptysis; (+) shortness of breath. (+) pleuritic CP  CARDIOVASCULAR: (+) pleuritic CP  GASTROINTESTINAL: No abdominal or epigastric pain. No nausea, vomiting, or hematemesis; No diarrhea or constipation. No melena or hematochezia.  GENITOURINARY: No dysuria, change in frequency or hematuria  NEUROLOGICAL: No numbness or weakness  SKIN: No itching, burning, rashes, or lesions   All other review of systems is negative unless indicated above.

## 2018-10-18 NOTE — ED ADULT NURSE NOTE - OBJECTIVE STATEMENT
Patient received to room 5 by EMS from [facility], with complaints of SOB, patient Bur[place]ian speaking, responsive to verbal and tactile stimulation, tachypneic, grimacing and guarding RUQ, skin warm and dry good for color, skin intact, lower extremities contracted, right AVF. Provider at bedside. Left forearm 20g IV placed. Hx CAD, DM, HTN, HDL, Afib on coumadin, Bipolar, Dementia. Unable to complete questionnaire due to patient acuity of illness.

## 2018-10-18 NOTE — CONSULT NOTE ADULT - ASSESSMENT
74 y/o Latvian speaking M with CAD s/p 12-14 stents placed in 1990's, and BABAR x 5 placed in Sept 2017, CHF with EF 20%, DM, HTN, HLD, AF on coumadin, CVA , ESRD on HD (MWF) COPD, bipolar disorder, dementia admitted for evaluation of SOB.

## 2018-10-18 NOTE — ED ADULT NURSE NOTE - CHIEF COMPLAINT QUOTE
from [facility] for sob since 830am. 2 albuterols  given by ems. pt appears in distress . 02 sat 100% at this time. pmh htn, dm, bipolar, asthma, bph, esrd , afib

## 2018-10-18 NOTE — H&P ADULT - NSHPPHYSICALEXAM_GEN_ALL_CORE
ICU Vital Signs Last 24 Hrs  T(C): 35.4 (10-18-18 @ 17:52), Max: 36.7 (10-18-18 @ 12:09)  T(F): 95.7 (10-18-18 @ 17:52), Max: 98.1 (10-18-18 @ 12:09)  HR: 80 (10-18-18 @ 18:28) (80 - 98)  BP: 134/67 (10-18-18 @ 18:28) (134/67 - 143/63)  BP(mean): 85 (10-18-18 @ 18:28) (83 - 85)  ABP: --  ABP(mean): --  RR: 19 (10-18-18 @ 18:28) (19 - 27)  SpO2: 100% (10-18-18 @ 18:28) (98% - 100%)    GENERAL: NAD  HEENT: EOMI, MMM,   Pulm: tachypneic, on BIPAP, b/l rhales   CV: RRR, S1&S2+,   ABDOMEN: soft, nt, nd,   MSK: nl ROM  EXTREMITIES:  no appreciable edema in b/l LE  Neuro: A&Ox3, no focal deficits  SKIN: warm and dry, no visible rash

## 2018-10-18 NOTE — H&P ADULT - ASSESSMENT
74 y/o [place]oc[place] speaking M with T2DM, HTN, CAD s/p 12-14 stents placed in 1990's, and BABAR x 5 placed in Sept 2017, CHF with EF 20%, AF (not a/c 2/2 GIB), CVA , ESRD on HD (MWF) COPD, bipolar disorder, p/w admitted for hypoxic respiratory failure 2/2 volume overload (ADHF vs not enough fluid removed during HD vs new pna). Improved on BIPAP. Admit to MICU for urgent HD:    #neuro   -AAOx3,    #pulm-hypoxic respiratory failure   -c/w BIPAP, pending HD and transition to NC when patient can toleraet  -CT chest w/ b/l effusions, will reassess with POCUS after HD, may require thoracentesis  -given hypothermia, will cover for pneumonia with vanc, zosyn   -f/u sputum culture, urine legionella  -COPD- c/w spiriva and duoneb q6    #cardiovascular  -hemodynamically stable off pressors   -CHF- fluid removal with HD  -afib- c/w ASA and plavix, off coumadin 2/2 GIB   -c/w midodrine with HD     #ID- sepsis 2/2 pna   -c/w vanc, zosyn and f/u cultures     #GI   -had incomplete cinesophagram on last admission, will c/w dysphagia diet   -speech consult placed for re-eval     #renal   -urgent HD today   -c/w sevelamer    #endo  -T2DM- c/w FS and SSI     #psych  -BPD- c/w abilify     #ppx  -HSQ    Robe Torres PGY3 74 y/o Micronesian speaking M with T2DM, HTN, CAD s/p 12-14 stents placed in 1990's, and BABAR x 5 placed in Sept 2017, CHF with EF 20%, AF (not a/c 2/2 GIB), CVA , ESRD on HD (MWF) COPD, bipolar disorder, p/w admitted for hypoxic respiratory failure 2/2 volume overload (ADHF vs not enough fluid removed during HD vs new pna). Improved on BIPAP. Admit to MICU for urgent HD:    #neuro   -AAOx3,    #pulm-hypoxic respiratory failure   -c/w BIPAP, pending HD and transition to NC when patient can toleraet  -CT chest w/ b/l effusions, will reassess with POCUS after HD, may require thoracentesis  -given hypothermia, will cover for pneumonia with vanc, zosyn   -f/u sputum culture, urine legionella  -COPD- c/w duoneb q6    #cardiovascular  -hemodynamically stable off pressors   -CHF- fluid removal with HD  -afib- c/w ASA and plavix, off coumadin 2/2 GIB   -c/w midodrine with HD     #ID- sepsis 2/2 pna   -c/w vanc, zosyn and f/u cultures     #GI   -had incomplete cinesophagram on last admission, will c/w dysphagia diet   -speech consult placed for re-eval     #renal   -urgent HD today   -c/w sevelamer    #endo  -T2DM- c/w FS and SSI     #psych  -BPD- c/w abilify     #ppx  -HSQ    Robe Torres PGY3

## 2018-10-18 NOTE — CONSULT NOTE ADULT - PROBLEM SELECTOR RECOMMENDATION 9
Pt with clinical and CT scan evidence of hypervolemia. Plan for urgent HD with UF to assist with volume overload.

## 2018-10-18 NOTE — ED ADULT NURSE REASSESSMENT NOTE - NS ED NURSE REASSESS COMMENT FT1
Patient began to grimace and appears uncomfortable, RUQ tenderness noted on palpation, patient grimacing increased, MD Gonzalez made aware. Will continue to monitor patient.

## 2018-10-19 LAB
ALBUMIN SERPL ELPH-MCNC: 3.6 G/DL — SIGNIFICANT CHANGE UP (ref 3.3–5)
ALP SERPL-CCNC: 85 U/L — SIGNIFICANT CHANGE UP (ref 40–120)
ALT FLD-CCNC: 8 U/L — SIGNIFICANT CHANGE UP (ref 4–41)
AST SERPL-CCNC: 11 U/L — SIGNIFICANT CHANGE UP (ref 4–40)
BASOPHILS # BLD AUTO: 0.01 K/UL — SIGNIFICANT CHANGE UP (ref 0–0.2)
BASOPHILS NFR BLD AUTO: 0.2 % — SIGNIFICANT CHANGE UP (ref 0–2)
BILIRUB SERPL-MCNC: 0.4 MG/DL — SIGNIFICANT CHANGE UP (ref 0.2–1.2)
BLD GP AB SCN SERPL QL: NEGATIVE — SIGNIFICANT CHANGE UP
BUN SERPL-MCNC: 15 MG/DL — SIGNIFICANT CHANGE UP (ref 7–23)
CALCIUM SERPL-MCNC: 9.2 MG/DL — SIGNIFICANT CHANGE UP (ref 8.4–10.5)
CHLORIDE SERPL-SCNC: 97 MMOL/L — LOW (ref 98–107)
CO2 SERPL-SCNC: 27 MMOL/L — SIGNIFICANT CHANGE UP (ref 22–31)
CREAT SERPL-MCNC: 1.91 MG/DL — HIGH (ref 0.5–1.3)
EOSINOPHIL # BLD AUTO: 0 K/UL — SIGNIFICANT CHANGE UP (ref 0–0.5)
EOSINOPHIL NFR BLD AUTO: 0 % — SIGNIFICANT CHANGE UP (ref 0–6)
GLUCOSE BLDC GLUCOMTR-MCNC: 173 MG/DL — HIGH (ref 70–99)
GLUCOSE SERPL-MCNC: 194 MG/DL — HIGH (ref 70–99)
HBV CORE AB SER-ACNC: NONREACTIVE — SIGNIFICANT CHANGE UP
HBV SURFACE AB SER-ACNC: <3 MLU/ML — LOW
HCT VFR BLD CALC: 27.6 % — LOW (ref 39–50)
HCV AB S/CO SERPL IA: 0.23 S/CO — SIGNIFICANT CHANGE UP
HCV AB SERPL-IMP: SIGNIFICANT CHANGE UP
HGB BLD-MCNC: 8.1 G/DL — LOW (ref 13–17)
IMM GRANULOCYTES # BLD AUTO: 0.01 # — SIGNIFICANT CHANGE UP
IMM GRANULOCYTES NFR BLD AUTO: 0.2 % — SIGNIFICANT CHANGE UP (ref 0–1.5)
LYMPHOCYTES # BLD AUTO: 0.59 K/UL — LOW (ref 1–3.3)
LYMPHOCYTES # BLD AUTO: 14.4 % — SIGNIFICANT CHANGE UP (ref 13–44)
MAGNESIUM SERPL-MCNC: 2.1 MG/DL — SIGNIFICANT CHANGE UP (ref 1.6–2.6)
MCHC RBC-ENTMCNC: 27.1 PG — SIGNIFICANT CHANGE UP (ref 27–34)
MCHC RBC-ENTMCNC: 29.3 % — LOW (ref 32–36)
MCV RBC AUTO: 92.3 FL — SIGNIFICANT CHANGE UP (ref 80–100)
MONOCYTES # BLD AUTO: 0.15 K/UL — SIGNIFICANT CHANGE UP (ref 0–0.9)
MONOCYTES NFR BLD AUTO: 3.6 % — SIGNIFICANT CHANGE UP (ref 2–14)
NEUTROPHILS # BLD AUTO: 3.35 K/UL — SIGNIFICANT CHANGE UP (ref 1.8–7.4)
NEUTROPHILS NFR BLD AUTO: 81.6 % — HIGH (ref 43–77)
NRBC # FLD: 0 — SIGNIFICANT CHANGE UP
PHOSPHATE SERPL-MCNC: 1.6 MG/DL — LOW (ref 2.5–4.5)
PLATELET # BLD AUTO: 287 K/UL — SIGNIFICANT CHANGE UP (ref 150–400)
PMV BLD: 10 FL — SIGNIFICANT CHANGE UP (ref 7–13)
POTASSIUM SERPL-MCNC: 3.7 MMOL/L — SIGNIFICANT CHANGE UP (ref 3.5–5.3)
POTASSIUM SERPL-SCNC: 3.7 MMOL/L — SIGNIFICANT CHANGE UP (ref 3.5–5.3)
PROT SERPL-MCNC: 6.7 G/DL — SIGNIFICANT CHANGE UP (ref 6–8.3)
RBC # BLD: 2.99 M/UL — LOW (ref 4.2–5.8)
RBC # FLD: 15 % — HIGH (ref 10.3–14.5)
RH IG SCN BLD-IMP: NEGATIVE — SIGNIFICANT CHANGE UP
RH IG SCN BLD-IMP: NEGATIVE — SIGNIFICANT CHANGE UP
SODIUM SERPL-SCNC: 138 MMOL/L — SIGNIFICANT CHANGE UP (ref 135–145)
SPECIMEN SOURCE: SIGNIFICANT CHANGE UP
SPECIMEN SOURCE: SIGNIFICANT CHANGE UP
TSH SERPL-MCNC: 3.98 UIU/ML — SIGNIFICANT CHANGE UP (ref 0.27–4.2)
VANCOMYCIN FLD-MCNC: 7.6 UG/ML — SIGNIFICANT CHANGE UP
WBC # BLD: 4.11 K/UL — SIGNIFICANT CHANGE UP (ref 3.8–10.5)
WBC # FLD AUTO: 4.11 K/UL — SIGNIFICANT CHANGE UP (ref 3.8–10.5)

## 2018-10-19 PROCEDURE — 99233 SBSQ HOSP IP/OBS HIGH 50: CPT | Mod: GC

## 2018-10-19 PROCEDURE — 99223 1ST HOSP IP/OBS HIGH 75: CPT | Mod: GC

## 2018-10-19 RX ORDER — AZITHROMYCIN 500 MG/1
500 TABLET, FILM COATED ORAL EVERY 24 HOURS
Qty: 0 | Refills: 0 | Status: DISCONTINUED | OUTPATIENT
Start: 2018-10-19 | End: 2018-10-22

## 2018-10-19 RX ORDER — AZITHROMYCIN 500 MG/1
TABLET, FILM COATED ORAL
Qty: 0 | Refills: 0 | Status: DISCONTINUED | OUTPATIENT
Start: 2018-10-19 | End: 2018-10-19

## 2018-10-19 RX ORDER — AZITHROMYCIN 500 MG/1
500 TABLET, FILM COATED ORAL ONCE
Qty: 0 | Refills: 0 | Status: COMPLETED | OUTPATIENT
Start: 2018-10-19 | End: 2018-10-19

## 2018-10-19 RX ORDER — MIDODRINE HYDROCHLORIDE 2.5 MG/1
2.5 TABLET ORAL
Qty: 0 | Refills: 0 | Status: DISCONTINUED | OUTPATIENT
Start: 2018-10-19 | End: 2018-10-23

## 2018-10-19 RX ORDER — POTASSIUM PHOSPHATE, MONOBASIC POTASSIUM PHOSPHATE, DIBASIC 236; 224 MG/ML; MG/ML
15 INJECTION, SOLUTION INTRAVENOUS ONCE
Qty: 0 | Refills: 0 | Status: COMPLETED | OUTPATIENT
Start: 2018-10-19 | End: 2018-10-19

## 2018-10-19 RX ADMIN — Medication 3 MILLILITER(S): at 04:04

## 2018-10-19 RX ADMIN — Medication 81 MILLIGRAM(S): at 12:40

## 2018-10-19 RX ADMIN — Medication 1 MILLIGRAM(S): at 23:21

## 2018-10-19 RX ADMIN — Medication 5 MILLIGRAM(S): at 23:21

## 2018-10-19 RX ADMIN — Medication 325 MILLIGRAM(S): at 12:40

## 2018-10-19 RX ADMIN — Medication 25 MILLIGRAM(S): at 05:28

## 2018-10-19 RX ADMIN — Medication 1: at 17:48

## 2018-10-19 RX ADMIN — SEVELAMER CARBONATE 800 MILLIGRAM(S): 2400 POWDER, FOR SUSPENSION ORAL at 14:48

## 2018-10-19 RX ADMIN — Medication 3 MILLILITER(S): at 09:55

## 2018-10-19 RX ADMIN — Medication 1: at 08:27

## 2018-10-19 RX ADMIN — Medication 1: at 12:33

## 2018-10-19 RX ADMIN — SEVELAMER CARBONATE 800 MILLIGRAM(S): 2400 POWDER, FOR SUSPENSION ORAL at 23:21

## 2018-10-19 RX ADMIN — HEPARIN SODIUM 5000 UNIT(S): 5000 INJECTION INTRAVENOUS; SUBCUTANEOUS at 05:27

## 2018-10-19 RX ADMIN — HEPARIN SODIUM 5000 UNIT(S): 5000 INJECTION INTRAVENOUS; SUBCUTANEOUS at 23:21

## 2018-10-19 RX ADMIN — AZITHROMYCIN 500 MILLIGRAM(S): 500 TABLET, FILM COATED ORAL at 23:21

## 2018-10-19 RX ADMIN — HEPARIN SODIUM 5000 UNIT(S): 5000 INJECTION INTRAVENOUS; SUBCUTANEOUS at 14:26

## 2018-10-19 RX ADMIN — ATORVASTATIN CALCIUM 40 MILLIGRAM(S): 80 TABLET, FILM COATED ORAL at 23:21

## 2018-10-19 RX ADMIN — PIPERACILLIN AND TAZOBACTAM 25 GRAM(S): 4; .5 INJECTION, POWDER, LYOPHILIZED, FOR SOLUTION INTRAVENOUS at 05:27

## 2018-10-19 RX ADMIN — SEVELAMER CARBONATE 800 MILLIGRAM(S): 2400 POWDER, FOR SUSPENSION ORAL at 05:28

## 2018-10-19 RX ADMIN — CLOPIDOGREL BISULFATE 75 MILLIGRAM(S): 75 TABLET, FILM COATED ORAL at 12:40

## 2018-10-19 RX ADMIN — POTASSIUM PHOSPHATE, MONOBASIC POTASSIUM PHOSPHATE, DIBASIC 62.5 MILLIMOLE(S): 236; 224 INJECTION, SOLUTION INTRAVENOUS at 06:00

## 2018-10-19 RX ADMIN — MIDODRINE HYDROCHLORIDE 2.5 MILLIGRAM(S): 2.5 TABLET ORAL at 14:26

## 2018-10-19 RX ADMIN — AZITHROMYCIN 250 MILLIGRAM(S): 500 TABLET, FILM COATED ORAL at 10:02

## 2018-10-19 RX ADMIN — AMIODARONE HYDROCHLORIDE 200 MILLIGRAM(S): 400 TABLET ORAL at 05:28

## 2018-10-19 NOTE — PROGRESS NOTE ADULT - SUBJECTIVE AND OBJECTIVE BOX
CHIEF COMPLAINT: Patient is a 73y old  Male who presents with a chief complaint of hypoxic respiratory failure (18 Oct 2018 18:15)      Interval Events:    REVIEW OF SYSTEMS:  Constitutional: [ ] fevers [ ] chills [ ] weight loss [ ] weight gain  HEENT: [ ] dry eyes [ ] eye irritation   CV: [ ] chest pain [ ] orthopnea [ ] palpitations   Resp: [ ] cough [ ] shortness of breath [ ] dyspnea [ ] wheezing [ ] sputum [ ] hemoptysis  GI: [ ] nausea [ ] vomiting [ ] diarrhea [ ] constipation [ ] abd pain [ ] dysphagia   : [ ] dysuria [ ] hematuria [ ] increased urinary frequency  Musculoskeletal: [ ] back pain [ ] myalgias [ ] arthralgias  Skin: [ ] rash [ ] itch  Neurological: [ ] headache [ ] dizziness [ ] syncope [ ] weakness [ ] numbness  Psychiatric: [ ] anxiety [ ] depression  Hematologic/Lymphatic: [ ] anemia [ ] bleeding problem  Allergic/Immunologic: [ ] itchy eyes [ ] nasal discharge [ ] hives [ ] angioedema  [ ] All other systems negative  [ ] Unable to assess ROS because ________    OBJECTIVE:  ICU Vital Signs Last 24 Hrs  T(C): 36.1 (19 Oct 2018 00:00), Max: 36.7 (18 Oct 2018 12:09)  T(F): 97 (19 Oct 2018 00:00), Max: 98.1 (18 Oct 2018 12:09)  HR: 69 (19 Oct 2018 06:00) (69 - 98)  BP: 134/48 (19 Oct 2018 06:00) (100/66 - 151/58)  BP(mean): 71 (19 Oct 2018 06:00) (60 - 95)  ABP: --  ABP(mean): --  RR: 20 (19 Oct 2018 06:00) (14 - 27)  SpO2: 97% (19 Oct 2018 06:00) (93% - 100%)        10-18 @ 07:01  -  10-19 @ 07:00  --------------------------------------------------------  IN:    IV PiggyBack: 200 mL    Oral Fluid: 360 mL    Other: 400 mL  Total IN: 960 mL    OUT:    Other: 3900 mL  Total OUT: 3900 mL    Total NET: -2940 mL        Weight (kg): 67.1 (10-18 @ 17:52)  CAPILLARY BLOOD GLUCOSE      POCT Blood Glucose.: 174 mg/dL (18 Oct 2018 22:10)  POCT Blood Glucose.: 258 mg/dL (18 Oct 2018 16:02)      PHYSICAL EXAM:  Neuro:    Pulm:    C/V:    GI/:    Lymph:    Neck:    LINES:    HOSPITAL MEDICATIONS:  MEDICATIONS  (STANDING):  ALBUTerol/ipratropium for Nebulization 3 milliLiter(s) Nebulizer every 6 hours  amiodarone    Tablet 200 milliGRAM(s) Oral daily  aspirin  chewable 81 milliGRAM(s) Oral daily  atorvastatin 40 milliGRAM(s) Oral at bedtime  busPIRone 5 milliGRAM(s) Oral <User Schedule>  clopidogrel Tablet 75 milliGRAM(s) Oral daily  doxazosin 1 milliGRAM(s) Oral at bedtime  ferrous    sulfate 325 milliGRAM(s) Oral daily  heparin  Injectable 5000 Unit(s) SubCutaneous every 8 hours  insulin lispro (HumaLOG) corrective regimen sliding scale   SubCutaneous three times a day before meals  insulin lispro (HumaLOG) corrective regimen sliding scale   SubCutaneous at bedtime  metoprolol tartrate 25 milliGRAM(s) Oral two times a day  midodrine 2.5 milliGRAM(s) Oral <User Schedule>  piperacillin/tazobactam IVPB. 3.375 Gram(s) IV Intermittent every 12 hours  sevelamer hydrochloride 800 milliGRAM(s) Oral three times a day    MEDICATIONS  (PRN):  melatonin 3 milliGRAM(s) Oral at bedtime PRN Insomnia      LABS:                        8.1    4.11  )-----------( 287      ( 19 Oct 2018 02:42 )             27.6 ,                       8.2    9.79  )-----------( 262      ( 18 Oct 2018 13:02 )             28.7   Hgb Trend: 8.1<--, 8.2<--    10-19    138  |  97<L>  |  15  ----------------------------<  194<H>  3.7   |  27  |  1.91<H>  10-18    142  |  98  |  25<H>  ----------------------------<  232<H>  4.0   |  28  |  2.93<H>    Ca   9.2   19 Oct 2018 02:42 /  , Ca   8.9   18 Oct 2018 13:00 /    Phos  1.6  10-19 /, Phos  3.3  10-18 /  Mg   2.1   10-19 /, Mg   2.2   10-18 /  TPro  6.7  /  Alb  3.6  /  TBili  0.4  /  DBili  x   /  AST  11  /  ALT  8   /  AlkPhos  85  10-19  TPro  6.6  /  Alb  3.5  /  TBili  0.2  /  DBili  x   /  AST  10  /  ALT  10  /  AlkPhos  87  10-18    Creatinine Trend: 1.91<--, 2.93<--    PT/INR - ( 18 Oct 2018 13:02 )   PT: 11.9 SEC;   INR: 1.07          PTT - ( 18 Oct 2018 13:02 )  PTT:36.8 SEC      Venous Blood Gas:  10-18 @ 13:04  7.28/71/31/28/42.2  VBG Lactate: 3.6          MICROBIOLOGY:       RADIOLOGY:

## 2018-10-19 NOTE — PROGRESS NOTE ADULT - ASSESSMENT
74 y/o [place]itanian speaking M with CAD s/p 12-14 stents placed in 1990's, and BABAR x 5 placed in Sept 2017, CHF with EF 20%, DM, HTN, HLD, AF on coumadin, CVA , ESRD on HD (MWF) COPD, bipolar disorder, dementia admitted for evaluation of SOB.

## 2018-10-19 NOTE — CHART NOTE - NSCHARTNOTEFT_GEN_A_CORE
74 y/o [place] speaking M with T2DM, HTN, CAD s/p 12-14 stents placed in 1990's, and BABAR x 5 placed in Sept 2017, CHF with EF 20%, AF (not a/c 2/2 GIB), CVA , ESRD on HD (MWF) COPD, bipolar disorder, p/w admitted for hypoxic respiratory failure 2/2 volume overload (ADHF vs not enough fluid removed during HD vs new pna). Improved on BIPAP. Admited to MICU for urgent HD. Pt had successful dialysis in the MICU and was able to wean off of bipap after HD. Pt is now on RA and stable. Plan for HD again today. Will need midodrine prior to HD, which has been ordered. House nephrology following. Pt also being treated for PNA with vancomycin and zosyn and being treated for a COPD exacerbation with duonebs q6hrs. Of note pt is DNR/DNI. Pt signed out to House Staff Team 4.\    Vital Signs Last 24 Hrs  T(C): 36.4 (19 Oct 2018 11:19), Max: 36.5 (18 Oct 2018 20:00)  T(F): 97.6 (19 Oct 2018 11:19), Max: 97.7 (18 Oct 2018 20:00)  HR: 76 (19 Oct 2018 11:25) (65 - 98)  BP: 116/62 (19 Oct 2018 11:19) (100/66 - 151/58)  BP(mean): 68 (19 Oct 2018 10:00) (60 - 95)  RR: 16 (19 Oct 2018 11:19) (14 - 27)  SpO2: 100% (19 Oct 2018 11:19) (93% - 100%)    PHYSICAL EXAM:  GENERAL: NAD, cachectic elderly man laying in bed, mildly tachypneic 18brpm  HEAD: Atraumatic, Normocephalic  EYES: EOMI, PERRLA, conjunctiva and sclera clear  NECK: Supple, No JVD  CHEST/LUNG: diffuse crackles 2/3 up posterior lung fields L>R; ant lung field clear, No wheezes noted. RR 18. No intercostal muscle retractions  HEART: Regular rate and rhythm; +HS murmur rad to axilla, no rubs, or gallops  ABDOMEN: Soft, Nontender, large, soft, reducible protrusion on L w/ +BS; Bowel sounds present  EXTREMITIES:  2+ dP pulses b/l, No clubbing, cyanosis, or edema  PSYCH: reactive affect  NEUROLOGY: AAOx3, non-focal  SKIN: No rashes or lesions    LABS:                        8.1    4.11  )-----------( 287      ( 19 Oct 2018 02:42 )             27.6     19 Oct 2018 02:42    138    |  97     |  15     ----------------------------<  194    3.7     |  27     |  1.91     Ca    9.2        19 Oct 2018 02:42  Phos  1.6       19 Oct 2018 02:42  Mg     2.1       19 Oct 2018 02:42    TPro  6.7    /  Alb  3.6    /  TBili  0.4    /  DBili  x      /  AST  11     /  ALT  8      /  AlkPhos  85     19 Oct 2018 02:42    PT/INR - ( 18 Oct 2018 13:02 )   PT: 11.9 SEC;   INR: 1.07          PTT - ( 18 Oct 2018 13:02 )  PTT:36.8 SEC    CT Abdomen and Pelvis No Cont (10.18.18 @ 15:36)     IMPRESSION:   Moderate pulmonary edema with bilateral pleural effusions.    Stable bladder calculus.        TTE with Doppler (w/Cont) (09.05.18 @ 09:23)     CONCLUSIONS:  1. Mildly thickened mitral valve. Mild-moderate mitral  regurgitation.  2. Moderate global left ventricular systolic dysfunction.  Endocardial visualization enhanced with intravenous  injection of echo contrast (Definity).  3. The right ventricle is not well visualized; grossly  normal right ventricular systolic function.  *** Compared with echocardiogram of 6/24/2018, no  significant changes noted.    Plan:   74 y/o [place] speaking M with T2DM, HTN, CAD s/p 12-14 stents placed in 1990's, and BABAR x 5 placed in Sept 2017, CHF with EF 20%, AF (not a/c 2/2 GIB), CVA , ESRD on HD (MWF) COPD, bipolar disorder, p/w admitted for hypoxic respiratory failure 2/2 volume overload though to be 2/2 volume overload w/ possible PNA and COPD exacerbation. Pt improved on BIPAP, urgent HD, duonebs. Scheduled for HD today.    # Hypoxic respiratory failure  -2/2 HFpEF exacerbation/volume overload + COPD exacerbation + possible PNA  -pt received HD yesterday, scheduled for HD today  -midodrine 2.5 mg today before HD  -EPO 2000 U at HD    #CAP  -pt p/w fevers, tachypnea, hypothermia, b/l pl effusions  -c/w vanc/zosyn  -f/u sputum cultures- waiting to be sent    #ESRD on HD MWF; R AVF intact +thrill/bruit  -HD today   -c/w ferrous sulfate 325mg qd  -EPO 2000U at HD  -nephro recs appreciated    #Atrial fibrillation   -on metoprolol tartrate 25 BID for rate control  -off a/c 2/2 GIB last admission? however, per review of chart 7/18, pt did have a bleed but no GI imaging was performed, Hgb stable - will investigate    #COPD  -c/w duoneb q6hrs    #T2DM  -last HBA1c    #CAD  -s/p 12-14stents in 90s + 5 BABAR's in 2017  -c/w asa 81mg, plavix 75mg po    #HLD  -c/w atorvastatin 40mg     #BPH  -on tamsulosin 0.4mg qd at home  -c/w doxazosin 1mg po qhs    #Preventive Measures  -DVT ppx: Heparin SQ q8hr  -Diet: dysphagia 1  -Dispo: [facility]; pt eval pending      Jayla Kulkarni  Team 4 74 y/o [place]an speaking M with T2DM, HTN, CAD s/p 12-14 stents placed in 1990's, and BABAR x 5 placed in Sept 2017, CHF with EF 20%, AF (not a/c 2/2 GIB), CVA , ESRD on HD (MWF) COPD, bipolar disorder, p/w admitted for hypoxic respiratory failure 2/2 volume overload (ADHF vs not enough fluid removed during HD vs new pna). Improved on BIPAP. Admited to MICU for urgent HD. Pt had successful dialysis in the MICU and was able to wean off of bipap after HD. Pt is now on RA and stable. Plan for HD again today. Will need midodrine prior to HD, which has been ordered. House nephrology following. Pt also being treated for PNA with vancomycin and zosyn and being treated for a COPD exacerbation with duonebs q6hrs. Of note pt is DNR/DNI. Pt signed out to House Staff Team 4.\    Vital Signs Last 24 Hrs  T(C): 36.4 (19 Oct 2018 11:19), Max: 36.5 (18 Oct 2018 20:00)  T(F): 97.6 (19 Oct 2018 11:19), Max: 97.7 (18 Oct 2018 20:00)  HR: 76 (19 Oct 2018 11:25) (65 - 98)  BP: 116/62 (19 Oct 2018 11:19) (100/66 - 151/58)  BP(mean): 68 (19 Oct 2018 10:00) (60 - 95)  RR: 16 (19 Oct 2018 11:19) (14 - 27)  SpO2: 100% (19 Oct 2018 11:19) (93% - 100%)    PHYSICAL EXAM:  GENERAL: NAD, cachectic elderly man laying in bed, mildly tachypneic 18brpm  HEAD: Atraumatic, Normocephalic  EYES: EOMI, PERRLA, conjunctiva and sclera clear  NECK: Supple, No JVD  CHEST/LUNG: diffuse crackles 2/3 up posterior lung fields L>R; ant lung field clear, No wheezes noted. RR 18. No intercostal muscle retractions  HEART: Regular rate and rhythm; +HS murmur rad to axilla, no rubs, or gallops  ABDOMEN: Soft, Nontender, large, soft, reducible protrusion on L w/ +BS; Bowel sounds present  EXTREMITIES:  2+ dP pulses b/l, No clubbing, cyanosis, or edema  PSYCH: reactive affect  NEUROLOGY: AAOx3, non-focal  SKIN: No rashes or lesions    LABS:                        8.1    4.11  )-----------( 287      ( 19 Oct 2018 02:42 )             27.6     19 Oct 2018 02:42    138    |  97     |  15     ----------------------------<  194    3.7     |  27     |  1.91     Ca    9.2        19 Oct 2018 02:42  Phos  1.6       19 Oct 2018 02:42  Mg     2.1       19 Oct 2018 02:42    TPro  6.7    /  Alb  3.6    /  TBili  0.4    /  DBili  x      /  AST  11     /  ALT  8      /  AlkPhos  85     19 Oct 2018 02:42    PT/INR - ( 18 Oct 2018 13:02 )   PT: 11.9 SEC;   INR: 1.07          PTT - ( 18 Oct 2018 13:02 )  PTT:36.8 SEC    CT Abdomen and Pelvis No Cont (10.18.18 @ 15:36)     IMPRESSION:   Moderate pulmonary edema with bilateral pleural effusions.    Stable bladder calculus.        TTE with Doppler (w/Cont) (09.05.18 @ 09:23)     CONCLUSIONS:  1. Mildly thickened mitral valve. Mild-moderate mitral  regurgitation.  2. Moderate global left ventricular systolic dysfunction.  Endocardial visualization enhanced with intravenous  injection of echo contrast (Definity).  3. The right ventricle is not well visualized; grossly  normal right ventricular systolic function.  *** Compared with echocardiogram of 6/24/2018, no  significant changes noted.    Plan:   74 y/o [place] speaking M with T2DM, HTN, CAD s/p 12-14 stents placed in 1990's, and BABAR x 5 placed in Sept 2017, CHF with EF 20%, AF (not a/c 2/2 GIB), CVA , ESRD on HD (MWF) COPD, bipolar disorder, p/w admitted for hypoxic respiratory failure 2/2 volume overload though to be 2/2 volume overload w/ possible PNA and COPD exacerbation. Pt improved on BIPAP, urgent HD, duonebs. Scheduled for HD today.    # Hypoxic respiratory failure  -2/2 HFpEF exacerbation/volume overload + COPD exacerbation + possible PNA  -pt received HD yesterday, scheduled for HD today  -midodrine 2.5 mg today before HD  -EPO 2000 U at HD    #CAP  -pt p/w fevers, tachypnea, hypothermia, b/l pl effusions  -c/w vanc/zosyn  -f/u sputum cultures- waiting to be sent    #ESRD on HD MWF; R AVF intact +thrill/bruit  -HD today   -c/w ferrous sulfate 325mg qd  -EPO 2000U at HD  -nephro recs appreciated    #Atrial fibrillation   -on metoprolol tartrate 25 BID, amiodarone 200mg  -off a/c 2/2 GIB last admission? however, per review of chart 7/18, pt did have a bleed but no GI imaging was performed, Hgb stable - will investigate    #HFrEF  -TTE shows improved EF 35% in 9/18 compared to 9/17 (20%), global LV dysfunction w/ mild-mod mitral thickening +regurg    #COPD  -c/w duoneb q6hrs    #T2DM  -last HBA1c    #CAD  -s/p 12-14stents in 90s + 5 BABAR's in 2017  -c/w asa 81mg, plavix 75mg po    #HLD  -c/w atorvastatin 40mg     #BPH  -on tamsulosin 0.4mg qd at home  -c/w doxazosin 1mg po qhs    #Anxiety  -c/w buspirone 5mg BID    #Preventive Measures  -DVT ppx: Heparin SQ q8hr  -Diet: dysphagia 1  -Dispo: [facility]; pt eval pending      Jayla Kulkarni  Team 4 72 y/o [place] speaking M with T2DM, HTN, CAD s/p 12-14 stents placed in 1990's, and BABAR x 5 placed in Sept 2017, CHF with EF 20%, AF (not a/c 2/2 GIB), CVA , ESRD on HD (MWF) COPD, bipolar disorder, p/w admitted for hypoxic respiratory failure 2/2 volume overload (ADHF vs not enough fluid removed during HD vs new pna). Improved on BIPAP. Admited to MICU for urgent HD. Pt had successful dialysis in the MICU and was able to wean off of bipap after HD. Pt is now on RA and stable. Plan for HD again today. Will need midodrine prior to HD, which has been ordered. House nephrology following. Pt also being treated for PNA with vancomycin and zosyn and being treated for a COPD exacerbation with duonebs q6hrs. Of note pt is DNR/DNI. Pt signed out to House Staff Team 4.\    Vital Signs Last 24 Hrs  T(C): 36.4 (19 Oct 2018 11:19), Max: 36.5 (18 Oct 2018 20:00)  T(F): 97.6 (19 Oct 2018 11:19), Max: 97.7 (18 Oct 2018 20:00)  HR: 76 (19 Oct 2018 11:25) (65 - 98)  BP: 116/62 (19 Oct 2018 11:19) (100/66 - 151/58)  BP(mean): 68 (19 Oct 2018 10:00) (60 - 95)  RR: 16 (19 Oct 2018 11:19) (14 - 27)  SpO2: 100% (19 Oct 2018 11:19) (93% - 100%)    PHYSICAL EXAM:  GENERAL: NAD, cachectic elderly man laying in bed, mildly tachypneic 18brpm  HEAD: Atraumatic, Normocephalic  EYES: EOMI, PERRLA, conjunctiva and sclera clear  NECK: Supple, No JVD  CHEST/LUNG: diffuse crackles 2/3 up posterior lung fields L>R; ant lung field clear, No wheezes noted. RR 18. No intercostal muscle retractions  HEART: Regular rate and rhythm; +HS murmur rad to axilla, no rubs, or gallops  ABDOMEN: Soft, Nontender, large, soft, reducible protrusion on L w/ +BS; Bowel sounds present  EXTREMITIES:  2+ dP pulses b/l, No clubbing, cyanosis, or edema  PSYCH: reactive affect  NEUROLOGY: AAOx3, non-focal  SKIN: No rashes or lesions    LABS:                        8.1    4.11  )-----------( 287      ( 19 Oct 2018 02:42 )             27.6     19 Oct 2018 02:42    138    |  97     |  15     ----------------------------<  194    3.7     |  27     |  1.91     Ca    9.2        19 Oct 2018 02:42  Phos  1.6       19 Oct 2018 02:42  Mg     2.1       19 Oct 2018 02:42    TPro  6.7    /  Alb  3.6    /  TBili  0.4    /  DBili  x      /  AST  11     /  ALT  8      /  AlkPhos  85     19 Oct 2018 02:42    PT/INR - ( 18 Oct 2018 13:02 )   PT: 11.9 SEC;   INR: 1.07          PTT - ( 18 Oct 2018 13:02 )  PTT:36.8 SEC    CT Abdomen and Pelvis No Cont (10.18.18 @ 15:36)     IMPRESSION:   Moderate pulmonary edema with bilateral pleural effusions.    Stable bladder calculus.        TTE with Doppler (w/Cont) (09.05.18 @ 09:23)     CONCLUSIONS:  1. Mildly thickened mitral valve. Mild-moderate mitral  regurgitation.  2. Moderate global left ventricular systolic dysfunction.  Endocardial visualization enhanced with intravenous  injection of echo contrast (Definity).  3. The right ventricle is not well visualized; grossly  normal right ventricular systolic function.  *** Compared with echocardiogram of 6/24/2018, no  significant changes noted.    Plan:   72 y/o [place] speaking M with T2DM, HTN, CAD s/p 12-14 stents placed in 1990's, and BABAR x 5 placed in Sept 2017, CHF with EF 20%, AF (not a/c 2/2 GIB), CVA , ESRD on HD (MWF) COPD, bipolar disorder, p/w admitted for hypoxic respiratory failure 2/2 volume overload though to be 2/2 volume overload w/ possible PNA and COPD exacerbation. Pt improved on BIPAP, urgent HD, duonebs. Scheduled for HD today.    # Hypoxic respiratory failure  -2/2 HFpEF exacerbation/volume overload + COPD exacerbation + possible PNA  -pt received HD yesterday, scheduled for HD today  -midodrine 2.5 mg today before HD  -EPO 2000 U at HD    #CAP  -pt p/w fevers, tachypnea, hypothermia, b/l pl effusions  -c/w azithromycin 500mg daily, zosyn 3.375 g q12h  -f/u sputum cultures- waiting to be sent    #ESRD on HD MWF; R AVF intact +thrill/bruit  -HD today   -c/w ferrous sulfate 325mg qd  -EPO 2000U at HD  -c/w sevelamir 800 mg TID  -nephro recs appreciated    #Atrial fibrillation   -on metoprolol tartrate 25 BID, amiodarone 200mg  -off a/c 2/2 GIB last admission? 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  -last HBA1c  -ISS TID, qhs    #CAD  -s/p 12-14stents in 90s + 5 BABAR's in 2017  -c/w asa 81mg, plavix 75mg po    #HLD  -c/w atorvastatin 40mg     #BPH  -on tamsulosin 0.4mg qd at home  -c/w doxazosin 1mg po qhs    #Anxiety  -c/w buspirone 5mg BID  -melatonin 3mg PRN qhs    #Preventive Measures  -DVT ppx: Heparin SQ q8hr  -Diet: dysphagia 1  -Dispo: [facility]; pt eval pending      Jayla Kulkarni  Team 4 74 y/o Somali speaking M with T2DM, HTN, CAD s/p 12-14 stents placed in 1990's, and BABAR x 5 placed in Sept 2017, CHF with EF 20%, AF (not a/c 2/2 GIB), CVA , ESRD on HD (MWF) COPD, bipolar disorder, p/w admitted for hypoxic respiratory failure 2/2 volume overload (ADHF vs not enough fluid removed during HD vs new pna). Improved on BIPAP. Admited to MICU for urgent HD. Pt had successful dialysis in the MICU and was able to wean off of bipap after HD. Pt is now on RA and stable. Plan for HD again today. Will need midodrine prior to HD, which has been ordered. House nephrology following. Pt also being treated for PNA with vancomycin and zosyn and being treated for a COPD exacerbation with duonebs q6hrs. Of note pt is DNR/DNI. Pt signed out to House Staff Team 4.\    Vital Signs Last 24 Hrs  T(C): 36.4 (19 Oct 2018 11:19), Max: 36.5 (18 Oct 2018 20:00)  T(F): 97.6 (19 Oct 2018 11:19), Max: 97.7 (18 Oct 2018 20:00)  HR: 76 (19 Oct 2018 11:25) (65 - 98)  BP: 116/62 (19 Oct 2018 11:19) (100/66 - 151/58)  BP(mean): 68 (19 Oct 2018 10:00) (60 - 95)  RR: 16 (19 Oct 2018 11:19) (14 - 27)  SpO2: 100% (19 Oct 2018 11:19) (93% - 100%)    PHYSICAL EXAM:  GENERAL: NAD, cachectic elderly man laying in bed, mildly tachypneic 18brpm  HEAD: Atraumatic, Normocephalic  EYES: EOMI, PERRLA, conjunctiva and sclera clear  NECK: Supple, No JVD  CHEST/LUNG: diffuse crackles 2/3 up posterior lung fields L>R; ant lung field clear, No wheezes noted. RR 18. No intercostal muscle retractions  HEART: Regular rate and rhythm; +HS murmur rad to axilla, no rubs, or gallops  ABDOMEN: Soft, Nontender, large, soft, reducible protrusion on L w/ +BS; Bowel sounds present  EXTREMITIES:  2+ dP pulses b/l, No clubbing, cyanosis, or edema  PSYCH: reactive affect  NEUROLOGY: AAOx3, non-focal  SKIN: No rashes or lesions    LABS:                        8.1    4.11  )-----------( 287      ( 19 Oct 2018 02:42 )             27.6     19 Oct 2018 02:42    138    |  97     |  15     ----------------------------<  194    3.7     |  27     |  1.91     Ca    9.2        19 Oct 2018 02:42  Phos  1.6       19 Oct 2018 02:42  Mg     2.1       19 Oct 2018 02:42    TPro  6.7    /  Alb  3.6    /  TBili  0.4    /  DBili  x      /  AST  11     /  ALT  8      /  AlkPhos  85     19 Oct 2018 02:42    PT/INR - ( 18 Oct 2018 13:02 )   PT: 11.9 SEC;   INR: 1.07          PTT - ( 18 Oct 2018 13:02 )  PTT:36.8 SEC    CT Abdomen and Pelvis No Cont (10.18.18 @ 15:36)     IMPRESSION:   Moderate pulmonary edema with bilateral pleural effusions.    Stable bladder calculus.        TTE with Doppler (w/Cont) (09.05.18 @ 09:23)     CONCLUSIONS:  1. Mildly thickened mitral valve. Mild-moderate mitral  regurgitation.  2. Moderate global left ventricular systolic dysfunction.  Endocardial visualization enhanced with intravenous  injection of echo contrast (Definity).  3. The right ventricle is not well visualized; grossly  normal right ventricular systolic function.  *** Compared with echocardiogram of 6/24/2018, no  significant changes noted.    Plan:   74 y/o Somali speaking M with T2DM, HTN, CAD s/p 12-14 stents placed in 1990's, and BABAR x 5 placed in Sept 2017, CHF with EF 20%, AF (not a/c 2/2 GIB), CVA , ESRD on HD (MWF) COPD, bipolar disorder, p/w admitted for hypoxic respiratory failure 2/2 volume overload though to be 2/2 volume overload w/ possible PNA and COPD exacerbation. Pt improved on BIPAP, urgent HD, duonebs. Scheduled for HD today.    # Hypoxic respiratory failure  -2/2 HFpEF exacerbation/volume overload + COPD exacerbation + possible PNA  -pt received HD yesterday, scheduled for HD today  -midodrine 2.5 mg today before HD  -EPO 2000 U at HD    #CAP  -pt p/w fevers, tachypnea, hypothermia, b/l pl effusions  -c/w azithromycin 500mg daily, zosyn 3.375 g q12h  -f/u sputum cultures- waiting to be sent    #ESRD on HD MWF; R AVF intact +thrill/bruit  -HD today   -c/w ferrous sulfate 325mg qd  -EPO 2000U at HD  -c/w sevelamir 800 mg TID  -nephro recs appreciated    #Atrial fibrillation   -on metoprolol tartrate 25 BID, amiodarone 200mg  -off a/c 2/2 GIB last admission? 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  -last HBA1c  -ISS TID, qhs    #CAD  -s/p 12-14stents in 90s + 5 BABAR's in 2017  -c/w asa 81mg, plavix 75mg po    #HLD  -c/w atorvastatin 40mg     #BPH  -on tamsulosin 0.4mg qd at home  -c/w doxazosin 1mg po qhs    #Bipolar d/o  -c/w buspirone 5mg BID (home med)  -melatonin 3mg PRN qhs    #Preventive Measures  -DVT ppx: Heparin SQ q8hr  -Diet: dysphagia 1  -Dispo: [facility]; pt eval pending      Jayla Kulkarni  Team 4 74 y/o [place] speaking M with T2DM, HTN, CAD s/p 12-14 stents placed in 1990's, and BABAR x 5 placed in Sept 2017, CHF with EF 20%, AF (not a/c 2/2 GIB), CVA , ESRD on HD (MWF) COPD, bipolar disorder, p/w admitted for hypoxic respiratory failure 2/2 volume overload (ADHF vs not enough fluid removed during HD vs new pna). Improved on BIPAP. Admited to MICU for urgent HD. Pt had successful dialysis in the MICU and was able to wean off of bipap after HD. Pt is now on RA and stable. Plan for HD again today. Will need midodrine prior to HD, which has been ordered. House nephrology following. Pt also being treated for PNA with vancomycin and zosyn and being treated for a COPD exacerbation with duonebs q6hrs. Of note pt is DNR/DNI. Pt signed out to House Staff Team 4.\    Vital Signs Last 24 Hrs  T(C): 36.4 (19 Oct 2018 11:19), Max: 36.5 (18 Oct 2018 20:00)  T(F): 97.6 (19 Oct 2018 11:19), Max: 97.7 (18 Oct 2018 20:00)  HR: 76 (19 Oct 2018 11:25) (65 - 98)  BP: 116/62 (19 Oct 2018 11:19) (100/66 - 151/58)  BP(mean): 68 (19 Oct 2018 10:00) (60 - 95)  RR: 16 (19 Oct 2018 11:19) (14 - 27)  SpO2: 100% (19 Oct 2018 11:19) (93% - 100%)    PHYSICAL EXAM:  GENERAL: NAD, cachectic elderly man laying in bed, mildly tachypneic 18brpm  HEAD: Atraumatic, Normocephalic  EYES: EOMI, PERRLA, conjunctiva and sclera clear  NECK: Supple, No JVD  CHEST/LUNG: diffuse crackles 2/3 up posterior lung fields L>R; ant lung field clear, No wheezes noted. RR 18. No intercostal muscle retractions  HEART: Regular rate and rhythm; +HS murmur rad to axilla, no rubs, or gallops  ABDOMEN: Soft, Nontender, large, soft, reducible protrusion on L w/ +BS; Bowel sounds present  EXTREMITIES:  2+ dP pulses b/l, No clubbing, cyanosis, or edema  PSYCH: reactive affect  NEUROLOGY: AAOx3, non-focal  SKIN: No rashes or lesions    LABS:                        8.1    4.11  )-----------( 287      ( 19 Oct 2018 02:42 )             27.6     19 Oct 2018 02:42    138    |  97     |  15     ----------------------------<  194    3.7     |  27     |  1.91     Ca    9.2        19 Oct 2018 02:42  Phos  1.6       19 Oct 2018 02:42  Mg     2.1       19 Oct 2018 02:42    TPro  6.7    /  Alb  3.6    /  TBili  0.4    /  DBili  x      /  AST  11     /  ALT  8      /  AlkPhos  85     19 Oct 2018 02:42    PT/INR - ( 18 Oct 2018 13:02 )   PT: 11.9 SEC;   INR: 1.07          PTT - ( 18 Oct 2018 13:02 )  PTT:36.8 SEC    CT Abdomen and Pelvis No Cont (10.18.18 @ 15:36)     IMPRESSION:   Moderate pulmonary edema with bilateral pleural effusions.    Stable bladder calculus.        TTE with Doppler (w/Cont) (09.05.18 @ 09:23)     CONCLUSIONS:  1. Mildly thickened mitral valve. Mild-moderate mitral  regurgitation.  2. Moderate global left ventricular systolic dysfunction.  Endocardial visualization enhanced with intravenous  injection of echo contrast (Definity).  3. The right ventricle is not well visualized; grossly  normal right ventricular systolic function.  *** Compared with echocardiogram of 6/24/2018, no  significant changes noted.    Plan:   74 y/o Macedonian speaking M with T2DM, HTN, CAD s/p 12-14 stents placed in 1990's, and BABAR x 5 placed in Sept 2017, CHF with EF 20%, AF (not a/c 2/2 GIB), CVA , ESRD on HD (MWF) COPD, bipolar disorder, p/w admitted for hypoxic respiratory failure 2/2 volume overload though to be 2/2 volume overload w/ possible PNA and COPD exacerbation. Pt improved on BIPAP, urgent HD, duonebs. Scheduled for HD today.    # Hypoxic respiratory failure  -2/2 HFpEF exacerbation/volume overload + COPD exacerbation + possible PNA  -pt received HD yesterday, scheduled for HD today  -midodrine 2.5 mg today before HD  -EPO 2000 U at HD    #CAP  -pt p/w fevers, tachypnea, hypothermia, b/l pl effusions  -c/w azithromycin 500mg daily, zosyn 3.375 g q12h  -f/u sputum cultures- waiting to be sent    #ESRD on HD MWF; R AVF intact +thrill/bruit  -HD today   -c/w ferrous sulfate 325mg qd  -EPO 2000U at HD  -c/w sevelamir 800 mg TID  -nephro recs appreciated    #Atrial fibrillation   -on metoprolol tartrate 25 BID, amiodarone 200mg  -off a/c 2/2 GIB last admission? 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  -last HBA1c  -ISS TID, qhs    #CAD  -s/p 12-14stents in 90s + 5 BABAR's in 2017  -c/w asa 81mg, plavix 75mg po    #HLD  -c/w atorvastatin 40mg     #BPH  -on tamsulosin 0.4mg qd at home  -c/w doxazosin 1mg po qhs    #Bipolar d/o  -c/w buspirone 5mg BID (home med)  -melatonin 3mg PRN qhs    #Preventive Measures  -DVT ppx: Heparin SQ q8hr  -Diet: dysphagia 1  -Dispo: [facility]; pt eval pending      Jayla Kulkarni  Team 4    Attending Attestation:  73M DM2, HTN, CAD s/p 12-14 stents placed in 1990's, and BABAR x 5 placed in Sept 2017, CHF with EF 35%, AF (not a/c 2/2 bleeding risk), hx CVA , ESRD on HD (MWF) COPD, bipolar disorder, p/w admitted for hypoxic respiratory failure 2/2 volume overload w/ possible PNA and COPD exacerbation  --Respiratory failure with hypothermia as possible sign of infection, would continue course of abx for pneumonia, possibly due to aspiration  --clinically greatly improved with volume removal by HD  --anticipate stability to return to SNF    I was physically present for the key portions of the evaluation and management (E/M) service provided.  I agree with the above history, physical, and plan which I have reviewed and edited where appropriate.     Plan discussed with Dr Kulkarni, patient.

## 2018-10-19 NOTE — PROGRESS NOTE ADULT - ASSESSMENT
72 y/o Faroese speaking M with T2DM, HTN, CAD s/p 12-14 stents placed in 1990's, and BABAR x 5 placed in Sept 2017, CHF with EF 20%, AF (not a/c 2/2 GIB), CVA , ESRD on HD (MWF) COPD, bipolar disorder, p/w admitted for hypoxic respiratory failure 2/2 volume overload (ADHF vs not enough fluid removed during HD vs new pna). Improved on BIPAP. Admit to MICU for urgent HD:    #neuro   -AAOx3,    #pulm-hypoxic respiratory failure   -c/w BIPAP, pending HD and transition to NC when patient can toleraet  -CT chest w/ b/l effusions, will reassess with POCUS after HD, may require thoracentesis  -given hypothermia, will cover for pneumonia with vanc, zosyn   -f/u sputum culture, urine legionella  -COPD- c/w duoneb q6    #cardiovascular  -hemodynamically stable off pressors   -CHF- fluid removal with HD  -afib- c/w ASA and plavix, off coumadin 2/2 GIB   -c/w midodrine with HD     #ID- sepsis 2/2 pna   -c/w vanc, zosyn and f/u cultures     #GI   -had incomplete cinesophagram on last admission, will c/w dysphagia diet   -speech consult placed for re-eval     #renal   -urgent HD today   -c/w sevelamer    #endo  -T2DM- c/w FS and SSI     #psych  -BPD- c/w abilify     #ppx  -HSQ

## 2018-10-19 NOTE — CHART NOTE - NSCHARTNOTEFT_GEN_A_CORE
MICU Transfer Note    Transfer from: MICU  Transfer to:  ( X ) Medicine    (  ) Telemetry    (  ) RCU    (  ) Palliative    (  ) Stroke Unit    (  ) Room: 926B  Accepting physician:      HPI:  72 y/o German speaking M with T2DM, HTN, CAD s/p 12-14 stents placed in 1990's, and BABAR x 5 placed in Sept 2017, CHF with EF 20%, AF (not a/c 2/2 GIB), CVA , ESRD on HD (MWF) COPD, bipolar disorder, dementia admitted for evaluation of SOB. Patient on BIPAP and history obtained from daughter at bedside. Reports patient has frequent admissions for SOB 2/2 volume overload. Patient has been SOB the past week. Last HD per daughter yesterday. Recieves midodrine prior to dailysis. Patient has not had fevers, productive cough, , abn pain, N/V/D/C. Makes a small amount of urine daily without dysuria.  C/o pleuritic CP and no palpitations.     In ED initially with increased WOB, tachypneic and placed on BIPAP. A CT chest reveals large b/l pleural effusions with possible consolidations. S/p vanc, cefepime, cipro, duoneb.  Admitted to MICU for urgent HD on 10/18 to assist with hypervolemia.     MICU COURSE:  Pt had Urgent HD in the MICU to assist with hypervolemia on 10/18 which was tolerated well. Plan for another session of HD today 10/19.       ASSESSMENT & PLAN:   72 y/o German speaking M with T2DM, HTN, CAD s/p 12-14 stents placed in 1990's, and BABAR x 5 placed in Sept 2017, CHF with EF 20%, AF (not a/c 2/2 GIB), CVA , ESRD on HD (MWF) COPD, bipolar disorder, p/w admitted for hypoxic respiratory failure 2/2 volume overload (ADHF vs not enough fluid removed during HD vs new pna). Improved on BIPAP. Admited to MICU for urgent HD.     #neuro   -AAOx3,    #pulm-hypoxic respiratory failure   - Saturating well on RA  - CT chest w/ b/l effusions, will reassess with POCUS after HD, may require thoracentesis  - Started vanc and zosyn on 10/18 for concern of PNA given    - F/u sputum culture, urine legionella  - COPD- c/w duoneb q6    #cardiovascular  - Hemodynamically stable off pressors   - CHF- fluid removal with HD  - Afib - c/w ASA and plavix, off coumadin 2/2 GIB   - c/w midodrine with HD     #ID- sepsis 2/2 pna   - c/w vanc, zosyn (10/18 ->); vancomycin dosed renally  - BCx 10/18 NGTD     #GI   - Had incomplete cinesophagram on last admission, will c/w dysphagia diet   - Speech consult placed for re-eval     #renal   - Last HD 10/18   - Plan for another session of HD today (10/19) to assist with volume overload  - c/w sevelamer    #endo  - T2DM- c/w FS and SSI     #psych  - BPD- c/w abilify     #ppx  - HSQ    For Follow-Up:  [ ] HD to assist with volume overload, c/w midodrine w/ HD  [ ] CT chest with b/l effusions, may require thoracentesis       Vital Signs Last 24 Hrs  T(C): 35.7 (19 Oct 2018 08:00), Max: 36.7 (18 Oct 2018 12:09)  T(F): 96.2 (19 Oct 2018 08:00), Max: 98.1 (18 Oct 2018 12:09)  HR: 73 (19 Oct 2018 08:00) (65 - 98)  BP: 124/51 (19 Oct 2018 08:00) (100/66 - 151/58)  BP(mean): 71 (19 Oct 2018 08:00) (60 - 95)  RR: 19 (19 Oct 2018 08:00) (14 - 27)  SpO2: 99% (19 Oct 2018 08:00) (93% - 100%)  I&O's Summary    18 Oct 2018 07:01  -  19 Oct 2018 07:00  --------------------------------------------------------  IN: 960 mL / OUT: 3900 mL / NET: -2940 mL          MEDICATIONS  (STANDING):  ALBUTerol/ipratropium for Nebulization 3 milliLiter(s) Nebulizer every 6 hours  amiodarone    Tablet 200 milliGRAM(s) Oral daily  aspirin  chewable 81 milliGRAM(s) Oral daily  atorvastatin 40 milliGRAM(s) Oral at bedtime  azithromycin  IVPB 500 milliGRAM(s) IV Intermittent once  azithromycin  IVPB      busPIRone 5 milliGRAM(s) Oral <User Schedule>  clopidogrel Tablet 75 milliGRAM(s) Oral daily  doxazosin 1 milliGRAM(s) Oral at bedtime  ferrous    sulfate 325 milliGRAM(s) Oral daily  heparin  Injectable 5000 Unit(s) SubCutaneous every 8 hours  insulin lispro (HumaLOG) corrective regimen sliding scale   SubCutaneous three times a day before meals  insulin lispro (HumaLOG) corrective regimen sliding scale   SubCutaneous at bedtime  metoprolol tartrate 25 milliGRAM(s) Oral two times a day  midodrine 2.5 milliGRAM(s) Oral <User Schedule>  piperacillin/tazobactam IVPB. 3.375 Gram(s) IV Intermittent every 12 hours  sevelamer hydrochloride 800 milliGRAM(s) Oral three times a day    MEDICATIONS  (PRN):  melatonin 3 milliGRAM(s) Oral at bedtime PRN Insomnia        LABS                                            8.1                   Neurophils% (auto):   81.6   (10-19 @ 02:42):    4.11 )-----------(287          Lymphocytes% (auto):  14.4                                          27.6                   Eosinphils% (auto):   0.0      Manual%: Neutrophils x    ; Lymphocytes x    ; Eosinophils x    ; Bands%: x    ; Blasts x                                    138    |  97     |  15                  Calcium: 9.2   / iCa: x      (10-19 @ 02:42)    ----------------------------<  194       Magnesium: 2.1                              3.7     |  27     |  1.91             Phosphorous: 1.6      TPro  6.7    /  Alb  3.6    /  TBili  0.4    /  DBili  x      /  AST  11     /  ALT  8      /  AlkPhos  85     19 Oct 2018 02:42    ( 10-18 @ 13:02 )   PT: 11.9 SEC;   INR: 1.07   aPTT: 36.8 SEC MICU Transfer Note    Transfer from: MICU  Transfer to:  ( X ) Medicine    (  ) Telemetry    (  ) RCU    (  ) Palliative    (  ) Stroke Unit    (  ) Room: 926B  Accepting physician:      HPI:  72 y/o Marshallese speaking M with T2DM, HTN, CAD s/p 12-14 stents placed in 1990's, and BABAR x 5 placed in Sept 2017, CHF with EF 20%, AF (not a/c 2/2 GIB), CVA , ESRD on HD (MWF) COPD, bipolar disorder, dementia admitted for evaluation of SOB. Patient on BIPAP and history obtained from daughter at bedside. Reports patient has frequent admissions for SOB 2/2 volume overload. Patient has been SOB the past week. Last HD per daughter yesterday. Recieves midodrine prior to dailysis. Patient has not had fevers, productive cough, , abn pain, N/V/D/C. Makes a small amount of urine daily without dysuria.  C/o pleuritic CP and no palpitations.     In ED initially with increased WOB, tachypneic and placed on BIPAP. A CT chest reveals large b/l pleural effusions with possible consolidations. S/p vanc, cefepime, cipro, duoneb.  Admitted to MICU for urgent HD on 10/18 to assist with hypervolemia.     MICU COURSE:  Pt had Urgent HD in the MICU to assist with hypervolemia on 10/18 which was tolerated well. Plan for another session of HD today 10/19.       ASSESSMENT & PLAN:   72 y/o Marshallese speaking M with T2DM, HTN, CAD s/p 12-14 stents placed in 1990's, and BABAR x 5 placed in Sept 2017, CHF with EF 20%, AF (not a/c 2/2 GIB), CVA , ESRD on HD (MWF) COPD, bipolar disorder, p/w admitted for hypoxic respiratory failure 2/2 volume overload (ADHF vs not enough fluid removed during HD vs new pna). Improved on BIPAP. Admited to MICU for urgent HD.     #neuro   -AAOx3,    #pulm-hypoxic respiratory failure   - Saturating well on RA  - CT chest w/ b/l effusions, will reassess with POCUS after HD, may require thoracentesis  - Started vanc and zosyn on 10/18 for concern of PNA given    - F/u sputum culture, urine legionella  - COPD- c/w duoneb q6    #cardiovascular  - Hemodynamically stable off pressors   - CHF- fluid removal with HD  - Afib - c/w ASA and plavix, off coumadin 2/2 GIB   - c/w midodrine with HD     #ID- sepsis 2/2 pna   - c/w vanc, zosyn (10/18 ->); vancomycin dosed renally  - BCx 10/18 NGTD     #GI   - Had incomplete cinesophagram on last admission, will c/w dysphagia diet   - Speech consult placed for re-eval     #renal   - Last HD 10/18   - Plan for another session of HD today (10/19) to assist with volume overload  - c/w sevelamer    #endo  - T2DM- c/w FS and SSI     #psych  - BPD- c/w abilify     #ppx  - HSQ    For Follow-Up:  [ ] HD to assist with volume overload, c/w midodrine w/ HD      Vital Signs Last 24 Hrs  T(C): 35.7 (19 Oct 2018 08:00), Max: 36.7 (18 Oct 2018 12:09)  T(F): 96.2 (19 Oct 2018 08:00), Max: 98.1 (18 Oct 2018 12:09)  HR: 73 (19 Oct 2018 08:00) (65 - 98)  BP: 124/51 (19 Oct 2018 08:00) (100/66 - 151/58)  BP(mean): 71 (19 Oct 2018 08:00) (60 - 95)  RR: 19 (19 Oct 2018 08:00) (14 - 27)  SpO2: 99% (19 Oct 2018 08:00) (93% - 100%)  I&O's Summary    18 Oct 2018 07:01  -  19 Oct 2018 07:00  --------------------------------------------------------  IN: 960 mL / OUT: 3900 mL / NET: -2940 mL          MEDICATIONS  (STANDING):  ALBUTerol/ipratropium for Nebulization 3 milliLiter(s) Nebulizer every 6 hours  amiodarone    Tablet 200 milliGRAM(s) Oral daily  aspirin  chewable 81 milliGRAM(s) Oral daily  atorvastatin 40 milliGRAM(s) Oral at bedtime  azithromycin  IVPB 500 milliGRAM(s) IV Intermittent once  azithromycin  IVPB      busPIRone 5 milliGRAM(s) Oral <User Schedule>  clopidogrel Tablet 75 milliGRAM(s) Oral daily  doxazosin 1 milliGRAM(s) Oral at bedtime  ferrous    sulfate 325 milliGRAM(s) Oral daily  heparin  Injectable 5000 Unit(s) SubCutaneous every 8 hours  insulin lispro (HumaLOG) corrective regimen sliding scale   SubCutaneous three times a day before meals  insulin lispro (HumaLOG) corrective regimen sliding scale   SubCutaneous at bedtime  metoprolol tartrate 25 milliGRAM(s) Oral two times a day  midodrine 2.5 milliGRAM(s) Oral <User Schedule>  piperacillin/tazobactam IVPB. 3.375 Gram(s) IV Intermittent every 12 hours  sevelamer hydrochloride 800 milliGRAM(s) Oral three times a day    MEDICATIONS  (PRN):  melatonin 3 milliGRAM(s) Oral at bedtime PRN Insomnia        LABS                                            8.1                   Neurophils% (auto):   81.6   (10-19 @ 02:42):    4.11 )-----------(287          Lymphocytes% (auto):  14.4                                          27.6                   Eosinphils% (auto):   0.0      Manual%: Neutrophils x    ; Lymphocytes x    ; Eosinophils x    ; Bands%: x    ; Blasts x                                    138    |  97     |  15                  Calcium: 9.2   / iCa: x      (10-19 @ 02:42)    ----------------------------<  194       Magnesium: 2.1                              3.7     |  27     |  1.91             Phosphorous: 1.6      TPro  6.7    /  Alb  3.6    /  TBili  0.4    /  DBili  x      /  AST  11     /  ALT  8      /  AlkPhos  85     19 Oct 2018 02:42    ( 10-18 @ 13:02 )   PT: 11.9 SEC;   INR: 1.07   aPTT: 36.8 SEC

## 2018-10-19 NOTE — PROGRESS NOTE ADULT - PROBLEM SELECTOR PLAN 1
Pt with clinical and CT scan evidence of hypervolemia. Pt admitted to the MICU and received urgent HD for hypervolemia on 10/18. Plan for another session of HD today to assist with volume overload

## 2018-10-19 NOTE — PROGRESS NOTE ADULT - SUBJECTIVE AND OBJECTIVE BOX
[facility] DIVISION OF KIDNEY DISEASES AND HYPERTENSION -- FOLLOW UP NOTE  --------------------------------------------------------------------------------    HPI: 72 y/o Micronesian speaking M with CAD s/p 12-14 stents placed in 1990's, and BABAR x 5 placed in Sept 2017, CHF with EF 20%, DM, HTN, HLD, AF on coumadin, CVA , ESRD on HD (MWF) COPD, bipolar disorder, dementia admitted for evaluation of SOB. Nephrology was consulted for management of ESRD and Hypervolemia. Pt's last hemodialysis was 10/17. A CT chest reveals large b/l pleural effusions with possible consolidations on 10/18. Pt had Urgent HD in the MICU to assist with hypervolemia on 10/18 which was tolerated well. Plan for another session of HD today.     PAST HISTORY  --------------------------------------------------------------------------------  No significant changes to PMH, PSH, FHx, SHx, unless otherwise noted    ALLERGIES & MEDICATIONS  --------------------------------------------------------------------------------  Allergies    No Known Allergies    Intolerances      Standing Inpatient Medications  ALBUTerol/ipratropium for Nebulization 3 milliLiter(s) Nebulizer every 6 hours  amiodarone    Tablet 200 milliGRAM(s) Oral daily  aspirin  chewable 81 milliGRAM(s) Oral daily  atorvastatin 40 milliGRAM(s) Oral at bedtime  azithromycin  IVPB 500 milliGRAM(s) IV Intermittent once  azithromycin  IVPB      busPIRone 5 milliGRAM(s) Oral <User Schedule>  clopidogrel Tablet 75 milliGRAM(s) Oral daily  doxazosin 1 milliGRAM(s) Oral at bedtime  ferrous    sulfate 325 milliGRAM(s) Oral daily  heparin  Injectable 5000 Unit(s) SubCutaneous every 8 hours  insulin lispro (HumaLOG) corrective regimen sliding scale   SubCutaneous three times a day before meals  insulin lispro (HumaLOG) corrective regimen sliding scale   SubCutaneous at bedtime  metoprolol tartrate 25 milliGRAM(s) Oral two times a day  midodrine 2.5 milliGRAM(s) Oral <User Schedule>  piperacillin/tazobactam IVPB. 3.375 Gram(s) IV Intermittent every 12 hours  sevelamer hydrochloride 800 milliGRAM(s) Oral three times a day    PRN Inpatient Medications  melatonin 3 milliGRAM(s) Oral at bedtime PRN      REVIEW OF SYSTEMS  --------------------------------------------------------------------------------  Gen: No weakness  Skin: No rashes  Head/Eyes/Ears/Mouth: No headache  Respiratory: + dyspnea  CV: No chest pain, PND, orthopnea  GI: No abdominal pain, diarrhea  : No increased frequency  MSK: No edema  Neuro: No dizziness/lightheadedness    All other systems were reviewed and are negative, except as noted.    VITALS/PHYSICAL EXAM  --------------------------------------------------------------------------------  T(C): 35.7 (10-19-18 @ 08:00), Max: 36.7 (10-18-18 @ 12:09)  HR: 73 (10-19-18 @ 08:00) (65 - 98)  BP: 124/51 (10-19-18 @ 08:00) (100/66 - 151/58)  RR: 19 (10-19-18 @ 08:00) (14 - 27)  SpO2: 99% (10-19-18 @ 08:00) (93% - 100%)  Wt(kg): --  Height (cm): 172.72 (10-18-18 @ 17:52)  Weight (kg): 67.1 (10-18-18 @ 17:52)  BMI (kg/m2): 22.5 (10-18-18 @ 17:52)  BSA (m2): 1.8 (10-18-18 @ 17:52)      10-18-18 @ 07:01  -  10-19-18 @ 07:00  --------------------------------------------------------  IN: 960 mL / OUT: 3900 mL / NET: -2940 mL    Physical Exam:  	Gen: NAD  	Pulm: Decrease BS at bases B/L  	CV: RRR, S1S2; no rub  	Abd: +BS, soft, nontender/nondistended  	UE: Warm, no asterixis  	LE: Warm, no edema  	Psych: Calm  	Skin: Warm, without rashes  	Vascular access: Right UE AVF present; thrill and bruit heard.     LABS/STUDIES  --------------------------------------------------------------------------------              8.1    4.11  >-----------<  287      [10-19-18 @ 02:42]              27.6     138  |  97  |  15  ----------------------------<  194      [10-19-18 @ 02:42]  3.7   |  27  |  1.91        Ca     9.2     [10-19-18 @ 02:42]      Mg     2.1     [10-19-18 @ 02:42]      Phos  1.6     [10-19-18 @ 02:42]    TPro  6.7  /  Alb  3.6  /  TBili  0.4  /  DBili  x   /  AST  11  /  ALT  8   /  AlkPhos  85  [10-19-18 @ 02:42]    PT/INR: PT 11.9 , INR 1.07       [10-18-18 @ 13:02]  PTT: 36.8       [10-18-18 @ 13:02]      Creatinine Trend:  SCr 1.91 [10-19 @ 02:42]  SCr 2.93 [10-18 @ 13:00]        Iron 41, TIBC 157, %sat --      [06-20-18 @ 06:00]  Ferritin 2578      [06-20-18 @ 06:00]  PTH 76.54 (Ca --)      [06-20-18 @ 06:00]   --  HbA1c 6.4      [07-12-18 @ 07:59]  TSH 3.98      [10-19-18 @ 02:42]  Lipid: chol 106, TG 97, HDL 33, LDL 54      [09-04-18 @ 06:12]    HBsAg NEGATIVE      [10-18-18 @ 18:00]

## 2018-10-19 NOTE — PROGRESS NOTE ADULT - PROBLEM SELECTOR PLAN 2
Pt with ESRD on HD. Pt had urgent HD yesterday due to volume overload. Plan for HD today to assist with further UF. Monitor BP during HD.

## 2018-10-19 NOTE — CHART NOTE - NSCHARTNOTEFT_GEN_A_CORE
**************************************  [facility] MICU Transfer Note  **************************************    72 y/o Turkish speaking M with T2DM, HTN, CAD s/p 12-14 stents placed in 1990's, and BABAR x 5 placed in Sept 2017, CHF with EF 20%, AF (not a/c 2/2 GIB), CVA , ESRD on HD (MWF) COPD, bipolar disorder, p/w admitted for hypoxic respiratory failure 2/2 volume overload (ADHF vs not enough fluid removed during HD vs new pna). Improved on BIPAP. Admited to MICU for urgent HD. Pt had successful dialysis in the MICU and was able to wean off of bipap after HD. Pt is now on RA and stable. Plan for HD again today. Will need midodrine prior to HD, which has been ordered. House nephrology following. Pt also being treated for PNA with vancomycin and zosyn and being treated for a COPD exacerbation with duonebs q6hrs. Of note pt is DNR/DNI. Pt signed out to House Staff Team 4.    VITALS:   Vital Signs Last 24 Hrs  T(C): 35.7 (19 Oct 2018 08:00), Max: 36.7 (18 Oct 2018 12:09)  T(F): 96.2 (19 Oct 2018 08:00), Max: 98.1 (18 Oct 2018 12:09)  HR: 74 (19 Oct 2018 10:00) (65 - 98)  BP: 111/56 (19 Oct 2018 10:00) (100/66 - 151/58)  BP(mean): 68 (19 Oct 2018 10:00) (60 - 95)  RR: 15 (19 Oct 2018 10:00) (14 - 27)  SpO2: 98% (19 Oct 2018 10:00) (93% - 100%)  I&O's Summary    18 Oct 2018 07:01  -  19 Oct 2018 07:00  --------------------------------------------------------  IN: 960 mL / OUT: 3900 mL / NET: -2940 mL      CAPILLARY BLOOD GLUCOSE      POCT Blood Glucose.: 159 mg/dL (19 Oct 2018 08:16)      PHYSICAL EXAM:  General: Chronically ill appearing in NAD on room air.  HEENT: PERRLA, EOMI, moist mucous membranes  Neurology: Alert, nonfocal, KRAUS x 4  Respiratory: CTA B/L, normal respiratory effort, no wheezes, crackles, rales  CV: RRR, S1S2, no murmurs, rubs or gallops  Abdominal: Soft, NT, ND +BS  Extremities: No edema, + peripheral pulses, +AVF palp thrill    Ralph Delgado MD  [facility]  Resident Physician  Emergency Medicine &  Internal Medicine  PGY-3  Pager: [phone]  Spectra: [phone]

## 2018-10-20 DIAGNOSIS — E78.5 HYPERLIPIDEMIA, UNSPECIFIED: ICD-10-CM

## 2018-10-20 DIAGNOSIS — N40.0 BENIGN PROSTATIC HYPERPLASIA WITHOUT LOWER URINARY TRACT SYMPTOMS: ICD-10-CM

## 2018-10-20 DIAGNOSIS — R06.02 SHORTNESS OF BREATH: ICD-10-CM

## 2018-10-20 DIAGNOSIS — Z29.9 ENCOUNTER FOR PROPHYLACTIC MEASURES, UNSPECIFIED: ICD-10-CM

## 2018-10-20 DIAGNOSIS — E11.9 TYPE 2 DIABETES MELLITUS WITHOUT COMPLICATIONS: ICD-10-CM

## 2018-10-20 DIAGNOSIS — J44.9 CHRONIC OBSTRUCTIVE PULMONARY DISEASE, UNSPECIFIED: ICD-10-CM

## 2018-10-20 DIAGNOSIS — F31.9 BIPOLAR DISORDER, UNSPECIFIED: ICD-10-CM

## 2018-10-20 DIAGNOSIS — M25.551 PAIN IN RIGHT HIP: ICD-10-CM

## 2018-10-20 DIAGNOSIS — I50.23 ACUTE ON CHRONIC SYSTOLIC (CONGESTIVE) HEART FAILURE: ICD-10-CM

## 2018-10-20 DIAGNOSIS — N18.6 END STAGE RENAL DISEASE: ICD-10-CM

## 2018-10-20 LAB
BUN SERPL-MCNC: 18 MG/DL — SIGNIFICANT CHANGE UP (ref 7–23)
CALCIUM SERPL-MCNC: 9.1 MG/DL — SIGNIFICANT CHANGE UP (ref 8.4–10.5)
CHLORIDE SERPL-SCNC: 100 MMOL/L — SIGNIFICANT CHANGE UP (ref 98–107)
CO2 SERPL-SCNC: 28 MMOL/L — SIGNIFICANT CHANGE UP (ref 22–31)
CREAT SERPL-MCNC: 2.76 MG/DL — HIGH (ref 0.5–1.3)
GLUCOSE BLDC GLUCOMTR-MCNC: 183 MG/DL — HIGH (ref 70–99)
GLUCOSE SERPL-MCNC: 119 MG/DL — HIGH (ref 70–99)
MAGNESIUM SERPL-MCNC: 2.2 MG/DL — SIGNIFICANT CHANGE UP (ref 1.6–2.6)
PHOSPHATE SERPL-MCNC: 2.3 MG/DL — LOW (ref 2.5–4.5)
POTASSIUM SERPL-MCNC: 3.2 MMOL/L — LOW (ref 3.5–5.3)
POTASSIUM SERPL-SCNC: 3.2 MMOL/L — LOW (ref 3.5–5.3)
SODIUM SERPL-SCNC: 142 MMOL/L — SIGNIFICANT CHANGE UP (ref 135–145)

## 2018-10-20 PROCEDURE — 99233 SBSQ HOSP IP/OBS HIGH 50: CPT | Mod: GC

## 2018-10-20 RX ORDER — SODIUM,POTASSIUM PHOSPHATES 278-250MG
1 POWDER IN PACKET (EA) ORAL ONCE
Qty: 0 | Refills: 0 | Status: COMPLETED | OUTPATIENT
Start: 2018-10-20 | End: 2018-10-20

## 2018-10-20 RX ORDER — POTASSIUM CHLORIDE 20 MEQ
20 PACKET (EA) ORAL ONCE
Qty: 0 | Refills: 0 | Status: COMPLETED | OUTPATIENT
Start: 2018-10-20 | End: 2018-10-20

## 2018-10-20 RX ORDER — TIOTROPIUM BROMIDE 18 UG/1
1 CAPSULE ORAL; RESPIRATORY (INHALATION) DAILY
Qty: 0 | Refills: 0 | Status: DISCONTINUED | OUTPATIENT
Start: 2018-10-20 | End: 2018-10-23

## 2018-10-20 RX ORDER — SEVELAMER CARBONATE 2400 MG/1
800 POWDER, FOR SUSPENSION ORAL
Qty: 0 | Refills: 0 | Status: DISCONTINUED | OUTPATIENT
Start: 2018-10-20 | End: 2018-10-23

## 2018-10-20 RX ADMIN — Medication 3 MILLILITER(S): at 16:13

## 2018-10-20 RX ADMIN — Medication 1 PACKET(S): at 11:36

## 2018-10-20 RX ADMIN — Medication 3 MILLIGRAM(S): at 21:41

## 2018-10-20 RX ADMIN — ATORVASTATIN CALCIUM 40 MILLIGRAM(S): 80 TABLET, FILM COATED ORAL at 21:41

## 2018-10-20 RX ADMIN — Medication 3 MILLILITER(S): at 10:27

## 2018-10-20 RX ADMIN — HEPARIN SODIUM 5000 UNIT(S): 5000 INJECTION INTRAVENOUS; SUBCUTANEOUS at 06:11

## 2018-10-20 RX ADMIN — Medication 20 MILLIEQUIVALENT(S): at 09:34

## 2018-10-20 RX ADMIN — AZITHROMYCIN 500 MILLIGRAM(S): 500 TABLET, FILM COATED ORAL at 22:26

## 2018-10-20 RX ADMIN — Medication 325 MILLIGRAM(S): at 11:36

## 2018-10-20 RX ADMIN — SEVELAMER CARBONATE 800 MILLIGRAM(S): 2400 POWDER, FOR SUSPENSION ORAL at 06:11

## 2018-10-20 RX ADMIN — Medication 25 MILLIGRAM(S): at 17:46

## 2018-10-20 RX ADMIN — Medication 1 MILLIGRAM(S): at 21:41

## 2018-10-20 RX ADMIN — SEVELAMER CARBONATE 800 MILLIGRAM(S): 2400 POWDER, FOR SUSPENSION ORAL at 13:31

## 2018-10-20 RX ADMIN — Medication 5 MILLIGRAM(S): at 21:41

## 2018-10-20 RX ADMIN — Medication 3 MILLILITER(S): at 21:07

## 2018-10-20 RX ADMIN — SEVELAMER CARBONATE 800 MILLIGRAM(S): 2400 POWDER, FOR SUSPENSION ORAL at 17:56

## 2018-10-20 RX ADMIN — Medication 81 MILLIGRAM(S): at 11:36

## 2018-10-20 RX ADMIN — HEPARIN SODIUM 5000 UNIT(S): 5000 INJECTION INTRAVENOUS; SUBCUTANEOUS at 13:31

## 2018-10-20 RX ADMIN — HEPARIN SODIUM 5000 UNIT(S): 5000 INJECTION INTRAVENOUS; SUBCUTANEOUS at 21:41

## 2018-10-20 RX ADMIN — Medication 25 MILLIGRAM(S): at 06:11

## 2018-10-20 RX ADMIN — AMIODARONE HYDROCHLORIDE 200 MILLIGRAM(S): 400 TABLET ORAL at 06:11

## 2018-10-20 RX ADMIN — CLOPIDOGREL BISULFATE 75 MILLIGRAM(S): 75 TABLET, FILM COATED ORAL at 11:36

## 2018-10-20 RX ADMIN — Medication 2: at 17:22

## 2018-10-20 NOTE — PROGRESS NOTE ADULT - PROBLEM SELECTOR PLAN 2
Hypoxic respiratory failure  -2/2 HFpEF exacerbation/volume overload + COPD exacerbation + possible PNA  -pt received HD yesterday, tolerated well; will f/u w/ nephro re: further recs. still has faint crackles b/l; however, improved from prior  pt p/w fevers, tachypnea, hypothermia, b/l pl effusions  -c/w azithromycin 500mg daily, augmentin 250 mg (renally dosed) - day 3/7 today  -f/u sputum cultures- waiting to be sent Hypoxic respiratory failure  -2/2 HFpEF exacerbation/volume overload + COPD exacerbation + possible PNA  -pt received HD yesterday, tolerated well; will f/u w/ nephro re: further recs. still has faint crackles b/l; however, improved from prior  pt p/w fevers, tachypnea, hypothermia, b/l pl effusions  -c/w azithromycin 500mg daily, augmentin 250 mg (renally dosed) - day 4/7 today  -f/u sputum cultures- waiting to be sent

## 2018-10-20 NOTE — PROGRESS NOTE ADULT - ASSESSMENT
72 y/o Zimbabwean speaking M with T2DM, HTN, CAD s/p 12-14 stents placed in 1990's, and BABAR x 5 placed in Sept 2017, CHF with EF 20%, AF (not a/c 2/2 GIB), CVA , ESRD on HD (MWF) COPD, bipolar disorder, p/w admitted for hypoxic respiratory failure 2/2 volume overload though to be 2/2 volume overload w/ possible PNA and COPD exacerbation. Pt improved on BIPAP, urgent HD, duonebs, stable to d/c pending R hip eval to r/o dislocation.

## 2018-10-20 NOTE — PROGRESS NOTE ADULT - PROBLEM SELECTOR PLAN 1
-no hematoma, reports of fall noted, low suspicion for fracture at this time  -may be 2/2 leg cramps from awkward positioning insisted by pt at night  -will obtain Abd xray to eval for posterior dislocation -no hematoma/bruising visible, no reports of fall noted; therefore, low suspicion for fracture or dislocation at this time  -may be 2/2 leg cramps from awkward positioning insisted by pt yesterday night  -will obtain R Hip xray to r/o posterior dislocation/fracture

## 2018-10-20 NOTE — PROGRESS NOTE ADULT - PROBLEM SELECTOR PLAN 4
on HD MWF; R AVF intact +thrill/bruit  -no plan for HD today, currently stable. will reeval on Monday  -c/w ferrous sulfate 325mg qd  -EPO 2000U at HD  -c/w sevelamir 800 mg TID  -nephro recs appreciated on HD MWF; R AVF intact +thrill/bruit  -no plan for HD today, currently stable. will re-eval on Monday  -c/w ferrous sulfate 325mg qd  -EPO 2000U at HD  -c/w sevelamir 800 mg TID  -nephro recs appreciated

## 2018-10-20 NOTE — PROGRESS NOTE ADULT - PROBLEM SELECTOR PLAN 3
-TTE shows improved EF 35% in 9/18 compared to 9/17 (20%), global LV dysfunction w/ mild-mod mitral thickening +regurg    Atrial fibrillation  -on metoprolol tartrate 25 BID, amiodarone 200mg  -off a/c 2/2 GIB last admission? however, per review of chart 7/18, pt did have a bleed but no GI imaging was performed, discussion was held between cards, prim team and GI, and risks of bleed det to be greater than benefit, d/c withheld -TTE shows improved EF 35% in 9/18 compared to 9/17 (20%), global LV dysfunction w/ mild-mod mitral thickening +regurg    Atrial fibrillation  -on metoprolol tartrate 25 BID, amiodarone 200mg  -off a/c 2/2 GIB on a previous admission; discussion was held between cards, prim team and GI, and risks of bleed det to be greater than benefit, d/c withheld

## 2018-10-20 NOTE — PROGRESS NOTE ADULT - SUBJECTIVE AND OBJECTIVE BOX
***************************************************************  Jayla Kulkarni PGY1  Internal Medicine   pager [phone]  ***************************************************************    JIMI BUCHANAN  73y  MRN: [id number]    Patient is a 73y old  Male who presents with a chief complaint of hypoxic respiratory failure (19 Oct 2018 08:35)      Subjective: Pt required constant obs during HD yesterday for attempting to pull at needle. Other than this, tolerated well. Spoke to pt with Fijian  this AM (ID [id number]). Pt states his breathing feels back at baseline; however, asking "Why does my right leg hurt?" Pt denies hitting leg on rail or otherwise, fall, bruising. Does not rmbr when pain started. Per RN staff, no reports of pt being agitated overnight, slept with his leg folded all night, refused to spread out legs.   Denies fever, CP, SOB, abn pain, N/V, dysuria. Tolerating diet.      MEDICATIONS  (STANDING):  ALBUTerol/ipratropium for Nebulization 3 milliLiter(s) Nebulizer every 6 hours  amiodarone    Tablet 200 milliGRAM(s) Oral daily  amoxicillin  250 milliGRAM(s)/clavulanate 1 Tablet(s) Oral every 24 hours  aspirin  chewable 81 milliGRAM(s) Oral daily  atorvastatin 40 milliGRAM(s) Oral at bedtime  azithromycin   Tablet 500 milliGRAM(s) Oral every 24 hours  busPIRone 5 milliGRAM(s) Oral <User Schedule>  clopidogrel Tablet 75 milliGRAM(s) Oral daily  doxazosin 1 milliGRAM(s) Oral at bedtime  ferrous    sulfate 325 milliGRAM(s) Oral daily  heparin  Injectable 5000 Unit(s) SubCutaneous every 8 hours  insulin lispro (HumaLOG) corrective regimen sliding scale   SubCutaneous three times a day before meals  insulin lispro (HumaLOG) corrective regimen sliding scale   SubCutaneous at bedtime  metoprolol tartrate 25 milliGRAM(s) Oral two times a day  midodrine 2.5 milliGRAM(s) Oral <User Schedule>  potassium phosphate / sodium phosphate powder 1 Packet(s) Oral once  sevelamer hydrochloride 800 milliGRAM(s) Oral three times a day    MEDICATIONS  (PRN):  melatonin 3 milliGRAM(s) Oral at bedtime PRN Insomnia      Objective:    Vitals: Vital Signs Last 24 Hrs  T(C): 36.8 (10-20-18 @ 05:46), Max: 37 (10-19-18 @ 20:53)  T(F): 98.2 (10-20-18 @ 05:46), Max: 98.6 (10-19-18 @ 20:53)  HR: 80 (10-20-18 @ 07:04) (72 - 80)  BP: 115/58 (10-20-18 @ 05:46) (95/51 - 116/62)  BP(mean): --  RR: 16 (10-20-18 @ 05:46) (16 - 16)  SpO2: 95% (10-20-18 @ 07:04) (95% - 100%)              I&O's Summary    19 Oct 2018 07:01  -  20 Oct 2018 07:00  --------------------------------------------------------  IN: 500 mL / OUT: 2456 mL / NET: -1956 mL        PHYSICAL EXAM:  GENERAL: NAD, cachectic elderly man laying in bed, appears well  HEAD: Atraumatic, Normocephalic  EYES: EOMI, PERRLA, conjunctiva and sclera clear  NECK: Supple, No JVD  CHEST/LUNG: diffuse mild crackles 2/3 up posterior lung fields L>R; ant lung field clear, No wheezes noted. No intercostal muscle retractions  HEART: Regular rate and rhythm; +HS murmur rad to axilla, no rubs, or gallops  ABDOMEN: Soft, Nontender, large, soft, reducible protrusion on L w/ +BS; Bowel sounds present  EXTREMITIES:  2+ dP pulses b/l, No clubbing, cyanosis, or edema. R hip internally rotated w/ adduction, mild tenderness elicited on hip flexion and rotation, leg adducted, stiff. no hematoma, bruising. L hip normal.  PSYCH: reactive affect  NEUROLOGY: AAOx3, non-focal  SKIN: No rashes or lesions    LABS:                        8.1    4.11  )-----------( 287      ( 19 Oct 2018 02:42 )             27.6                         8.2    9.79  )-----------( 262      ( 18 Oct 2018 13:02 )             28.7     10-20    142  |  100  |  18  ----------------------------<  119<H>  3.2<L>   |  28  |  2.76<H>  10-19    138  |  97<L>  |  15  ----------------------------<  194<H>  3.7   |  27  |  1.91<H>  10-18    142  |  98  |  25<H>  ----------------------------<  232<H>  4.0   |  28  |  2.93<H>    Ca    9.1      20 Oct 2018 06:30  Ca    9.2      19 Oct 2018 02:42  Ca    8.9      18 Oct 2018 13:00  Phos  2.3     10-20  Mg     2.2     10-20    TPro  6.7  /  Alb  3.6  /  TBili  0.4  /  DBili  x   /  AST  11  /  ALT  8   /  AlkPhos  85  10-19  TPro  6.6  /  Alb  3.5  /  TBili  0.2  /  DBili  x   /  AST  10  /  ALT  10  /  AlkPhos  87  10-18    PT/INR - ( 18 Oct 2018 13:02 )   PT: 11.9 SEC;   INR: 1.07          PTT - ( 18 Oct 2018 13:02 )  PTT:36.8 SEC                  CAPILLARY BLOOD GLUCOSE      POCT Blood Glucose.: 120 mg/dL (20 Oct 2018 08:10)  POCT Blood Glucose.: 178 mg/dL (19 Oct 2018 21:39)  POCT Blood Glucose.: 173 mg/dL (19 Oct 2018 17:23)  POCT Blood Glucose.: 175 mg/dL (19 Oct 2018 16:52)  POCT Blood Glucose.: 172 mg/dL (19 Oct 2018 16:33)  POCT Blood Glucose.: 168 mg/dL (19 Oct 2018 12:09)    RADIOLOGY & ADDITIONAL TESTS:  Imaging Personally Reviewed:  [x ] YES  [ ] NO  Consultants involved in case:   Consultant(s) Notes Reviewed:  [ x] YES  [ ] NO:   Care Discussed with Consultants/Other Providers [x ] YES  [ ] NO

## 2018-10-21 ENCOUNTER — TRANSCRIPTION ENCOUNTER (OUTPATIENT)
Age: 73
End: 2018-10-21

## 2018-10-21 LAB
BUN SERPL-MCNC: 41 MG/DL — HIGH (ref 7–23)
CALCIUM SERPL-MCNC: 9.1 MG/DL — SIGNIFICANT CHANGE UP (ref 8.4–10.5)
CHLORIDE SERPL-SCNC: 102 MMOL/L — SIGNIFICANT CHANGE UP (ref 98–107)
CO2 SERPL-SCNC: 27 MMOL/L — SIGNIFICANT CHANGE UP (ref 22–31)
CREAT SERPL-MCNC: 4.51 MG/DL — HIGH (ref 0.5–1.3)
GLUCOSE BLDC GLUCOMTR-MCNC: 193 MG/DL — HIGH (ref 70–99)
GLUCOSE SERPL-MCNC: 135 MG/DL — HIGH (ref 70–99)
HCT VFR BLD CALC: 31.6 % — LOW (ref 39–50)
HGB BLD-MCNC: 9.1 G/DL — LOW (ref 13–17)
MAGNESIUM SERPL-MCNC: 2.2 MG/DL — SIGNIFICANT CHANGE UP (ref 1.6–2.6)
MCHC RBC-ENTMCNC: 27.7 PG — SIGNIFICANT CHANGE UP (ref 27–34)
MCHC RBC-ENTMCNC: 28.8 % — LOW (ref 32–36)
MCV RBC AUTO: 96 FL — SIGNIFICANT CHANGE UP (ref 80–100)
NRBC # FLD: 0 — SIGNIFICANT CHANGE UP
PHOSPHATE SERPL-MCNC: 3.6 MG/DL — SIGNIFICANT CHANGE UP (ref 2.5–4.5)
PLATELET # BLD AUTO: 303 K/UL — SIGNIFICANT CHANGE UP (ref 150–400)
PMV BLD: 9.9 FL — SIGNIFICANT CHANGE UP (ref 7–13)
POTASSIUM SERPL-MCNC: 3.7 MMOL/L — SIGNIFICANT CHANGE UP (ref 3.5–5.3)
POTASSIUM SERPL-SCNC: 3.7 MMOL/L — SIGNIFICANT CHANGE UP (ref 3.5–5.3)
RBC # BLD: 3.29 M/UL — LOW (ref 4.2–5.8)
RBC # FLD: 15.8 % — HIGH (ref 10.3–14.5)
SODIUM SERPL-SCNC: 145 MMOL/L — SIGNIFICANT CHANGE UP (ref 135–145)
WBC # BLD: 8.85 K/UL — SIGNIFICANT CHANGE UP (ref 3.8–10.5)
WBC # FLD AUTO: 8.85 K/UL — SIGNIFICANT CHANGE UP (ref 3.8–10.5)

## 2018-10-21 PROCEDURE — 99233 SBSQ HOSP IP/OBS HIGH 50: CPT | Mod: GC

## 2018-10-21 RX ADMIN — Medication 3 MILLILITER(S): at 15:45

## 2018-10-21 RX ADMIN — Medication 3 MILLILITER(S): at 21:07

## 2018-10-21 RX ADMIN — SEVELAMER CARBONATE 800 MILLIGRAM(S): 2400 POWDER, FOR SUSPENSION ORAL at 08:33

## 2018-10-21 RX ADMIN — Medication 1 MILLIGRAM(S): at 21:28

## 2018-10-21 RX ADMIN — HEPARIN SODIUM 5000 UNIT(S): 5000 INJECTION INTRAVENOUS; SUBCUTANEOUS at 13:28

## 2018-10-21 RX ADMIN — HEPARIN SODIUM 5000 UNIT(S): 5000 INJECTION INTRAVENOUS; SUBCUTANEOUS at 21:28

## 2018-10-21 RX ADMIN — CLOPIDOGREL BISULFATE 75 MILLIGRAM(S): 75 TABLET, FILM COATED ORAL at 12:26

## 2018-10-21 RX ADMIN — Medication 81 MILLIGRAM(S): at 12:26

## 2018-10-21 RX ADMIN — Medication 1: at 17:47

## 2018-10-21 RX ADMIN — Medication 325 MILLIGRAM(S): at 12:26

## 2018-10-21 RX ADMIN — TIOTROPIUM BROMIDE 1 CAPSULE(S): 18 CAPSULE ORAL; RESPIRATORY (INHALATION) at 10:38

## 2018-10-21 RX ADMIN — Medication 3 MILLILITER(S): at 03:42

## 2018-10-21 RX ADMIN — Medication 25 MILLIGRAM(S): at 06:44

## 2018-10-21 RX ADMIN — HEPARIN SODIUM 5000 UNIT(S): 5000 INJECTION INTRAVENOUS; SUBCUTANEOUS at 06:45

## 2018-10-21 RX ADMIN — AMIODARONE HYDROCHLORIDE 200 MILLIGRAM(S): 400 TABLET ORAL at 06:44

## 2018-10-21 RX ADMIN — SEVELAMER CARBONATE 800 MILLIGRAM(S): 2400 POWDER, FOR SUSPENSION ORAL at 17:47

## 2018-10-21 RX ADMIN — SEVELAMER CARBONATE 800 MILLIGRAM(S): 2400 POWDER, FOR SUSPENSION ORAL at 12:26

## 2018-10-21 RX ADMIN — Medication 3 MILLILITER(S): at 10:00

## 2018-10-21 RX ADMIN — AZITHROMYCIN 500 MILLIGRAM(S): 500 TABLET, FILM COATED ORAL at 22:49

## 2018-10-21 RX ADMIN — Medication 25 MILLIGRAM(S): at 17:47

## 2018-10-21 RX ADMIN — Medication 5 MILLIGRAM(S): at 21:28

## 2018-10-21 RX ADMIN — ATORVASTATIN CALCIUM 40 MILLIGRAM(S): 80 TABLET, FILM COATED ORAL at 21:28

## 2018-10-21 NOTE — DISCHARGE NOTE ADULT - OTHER SIGNIFICANT FINDINGS
Xray Chest 1 View- PORTABLE-Urgent (10.18.18 @ 13:28)   IMPRESSION:  Lower right hemithorax haziness may reflect a small layering pleural   effusion.  Dense left basilar/or discrete opacification with obscured medial left   hemidiaphragm suggesting underlying parietal consolidation clear possible   infiltrate/pneumonia in the proper clinical context. Ill-defined   increased markings and hazy opacity in right lower lung may reflect   consolidative changes as well.  Fine strand-like opacities in peripheral left midlung compatible with   subsegmental atelectatic change.  Heart size and mediastinal width inaccurately assessed on this   projection. Aortic calcifications.  Trachea midline.  Generalized osteopenia and spinal degenerative change.    CT Chest No Cont (10.18.18 @ 15:35)   CT Abdomen and Pelvis No Cont (10.18.18 @ 15:36)     FINDINGS:  CHEST:   LUNGS AND LARGE AIRWAYS/PLEURA: Diffuse groundglass opacities and   interlobular septal thickening. Moderate right and left pleural effusions   with associated bibasilar compressive atelectasis. Patent central airways.  VESSELS: Coronary and aortic atherosclerosis  HEART: Cardiomegaly. No pericardial effusion.  MEDIASTINUM AND QUITA:No lymphadenopathy.  CHEST WALL AND LOWER NECK: Within normal limits.  ABDOMEN AND PELVIS:  LIVER: Within normal limits.  BILE DUCTS: Normal caliber.  GALLBLADDER: Tiny gallstones.  SPLEEN: Within normal limits.  PANCREAS: Atrophic pancreas  ADRENALS: Within normal limits.  KIDNEYS/URETERS: Atrophic kidneys bilaterally. No hydroureter or   hydronephrosis. No renal calculi.  BLADDER: Stable bladder calculus measuring 1.9 cm.   REPRODUCTIVE ORGANS: Prostate within normal limits.  BOWEL: Focal narrowing of the sigmoid colon, new since 6/22/2018, and   likely represents peristalsis. No bowel obstruction. Appendix is normal.  PERITONEUM: No ascites.  VESSELS:  Within normal limits.  RETROPERITONEUM: No lymphadenopathy.    ABDOMINAL WALL:Within normal limits.  BONES: Spondylosis.    IMPRESSION:   Moderate pulmonary edema with bilateral pleural effusions.  Stable bladder calculus.

## 2018-10-21 NOTE — PHYSICAL THERAPY INITIAL EVALUATION ADULT - NS ASR WT BEARING DETAIL RLE
partial weight-bearing/However, as per PT discretion, keeping Right LE NWB as pt. is r/o hip dislocation and fracture

## 2018-10-21 NOTE — PHYSICAL THERAPY INITIAL EVALUATION ADULT - CRITERIA FOR SKILLED THERAPEUTIC INTERVENTIONS
impairments found/anticipated equipment needs at discharge/therapy frequency/anticipated discharge recommendation/rehab potential/predicted duration of therapy intervention impairments found/therapy frequency/anticipated discharge recommendation/rehab potential/predicted duration of therapy intervention

## 2018-10-21 NOTE — PROGRESS NOTE ADULT - ASSESSMENT
74 y/o Marshallese speaking M with T2DM, HTN, CAD s/p 12-14 stents placed in 1990's, and BABAR x 5 placed in Sept 2017, CHF with EF 20%, AF (not a/c 2/2 GIB), CVA , ESRD on HD (MWF) COPD, bipolar disorder, p/w admitted for hypoxic respiratory failure 2/2 volume overload though to be 2/2 volume overload w/ possible PNA and COPD exacerbation. Pt improved on BIPAP, urgent HD, duonebs, stable to d/c pending R hip eval to r/o dislocation.

## 2018-10-21 NOTE — DISCHARGE NOTE ADULT - PATIENT PORTAL LINK FT
You can access the [facility], offered by [facility], by registering with the following website: [email or web address]

## 2018-10-21 NOTE — DISCHARGE NOTE ADULT - MEDICATION SUMMARY - MEDICATIONS TO STOP TAKING
I will STOP taking the medications listed below when I get home from the hospital:  None I will STOP taking the medications listed below when I get home from the hospital:    predniSONE 20 mg oral tablet  -- 1.5 tab(s) by mouth once a day

## 2018-10-21 NOTE — PHYSICAL THERAPY INITIAL EVALUATION ADULT - RANGE OF MOTION EXAMINATION, REHAB EVAL
Right LE knee ~1/2 range, Left LE WNL/bilateral upper extremity ROM was WFL (within functional limits)

## 2018-10-21 NOTE — PROGRESS NOTE ADULT - PROBLEM SELECTOR PLAN 10
-DVT ppx: Heparin SQ q8hr  -Diet: dysphagia 1  -Dispo: [facility]; pt eval pending -DVT ppx: Heparin SQ q8hr  -Diet: dysphagia 1  -Dispo: [facility]

## 2018-10-21 NOTE — PROGRESS NOTE ADULT - PROBLEM SELECTOR PLAN 2
Hypoxic respiratory failure  -2/2 HFpEF exacerbation/volume overload + COPD exacerbation + possible PNA  -pt received HD yesterday, tolerated well; will f/u w/ nephro re: further recs. still has faint crackles b/l; however, improved from prior  pt p/w fevers, tachypnea, hypothermia, b/l pl effusions  -c/w azithromycin 500mg daily, augmentin 250 mg (renally dosed) - day 4/7 today  -f/u sputum cultures- waiting to be sent Hypoxic respiratory failure  -2/2 HFpEF exacerbation/volume overload + COPD exacerbation + possible PNA  -pt received HD yesterday, tolerated well; will f/u w/ nephro   -c/w azithromycin 500mg daily, augmentin 250 mg (renally dosed)   -f/u sputum cultures

## 2018-10-21 NOTE — PROGRESS NOTE ADULT - PROBLEM SELECTOR PLAN 4
on HD MWF; R AVF intact +thrill/bruit  -no plan for HD today, currently stable. will re-eval on Monday  -c/w ferrous sulfate 325mg qd  -EPO 2000U at HD  -c/w sevelamir 800 mg TID  -nephro recs appreciated on HD MWF; R AVF intact +thrill/bruit  -c/w ferrous sulfate 325mg qd  -EPO 2000U at HD  -c/w sevelamir 800 mg TID  -nephro recs appreciated

## 2018-10-21 NOTE — PHYSICAL THERAPY INITIAL EVALUATION ADULT - DISCHARGE DISPOSITION, PT EVAL
TBD pending completion of functional assessment Anticipate return to prior nursing facility, please follow PT notes.

## 2018-10-21 NOTE — DISCHARGE NOTE ADULT - HOSPITAL COURSE
74 y/o Rwandan speaking M with T2DM, HTN, CAD s/p 12-14 stents placed in 1990's, and BABAR x 5 placed in Sept 2017, CHF with EF 20%, AF (not a/c 2/2 GIB), CVA , ESRD on HD (MWF; last session 1 day PTA, receives midodrine, oliguric) COPD, bipolar disorder, dementia (AOX3) w/ frequent admissions for volume overload who p/w w/ worsening SOB x 1 wk and pleuritic CP x few days. Per daughter, denied recent fevers, productive cough, palpitations, abn pain, N/V/D/C. In the ED, pt was initially found tachypneic w/ increased WOB, so he was placed on BIPAP. A CT chest revealed large b/l pleural effusions with possible consolidations. Pt was given vanc, cefepime, cipro,and duonebs, then admitted to MICU for urgent HD on 10/18 to assist with hypervolemia.     MICU COURSE:  Pt had Urgent HD in the MICU to assist with hypervolemia on 10/18 which was tolerated well. Pt was taken off BiPap, 02 Sats remained 100% on RA w/ residual crackles. Pt transferred to medicine in stable condition.  MEDICINE COURSE:  Pt remained HD stable w/ no signs of resp distress. Pt received HD again on 10/19 for continued hypervolemia, tolerated well. With use of Rwandan , pt endorsed feeling at baseline. He continues to have mild diffuse insp crackles, no resp distress, satting at 100% RA. On 10/20, pt endorsed some R leg pain, no recent trauma noted; hip Xray was ___ for dislocation/fracture, managed w/ pain control. Pt received HD on ______ prior to d/c.       On day of discharge patient is medically and hemodynamically stable for discharge to rehab facility. 72 y/o Swiss speaking M with T2DM, HTN, CAD s/p 12-14 stents placed in 1990's, and BABAR x 5 placed in Sept 2017, CHF with EF 20%, AF (not a/c 2/2 GIB), CVA , ESRD on HD (MWF; last session 1 day PTA, receives midodrine, oliguric) COPD, bipolar disorder, dementia (AOX3) w/ frequent admissions for volume overload who p/w w/ worsening SOB x 1 wk and pleuritic CP x few days. Per daughter, denied recent fevers, productive cough, palpitations, abn pain, N/V/D/C. In the ED, pt was initially found tachypneic w/ increased WOB, so he was placed on BIPAP. A CT chest revealed large b/l pleural effusions with possible consolidations. Pt was given vanc, cefepime, cipro,and duonebs, then admitted to MICU for urgent HD on 10/18 to assist with hypervolemia.     MICU COURSE:  Pt had Urgent HD in the MICU to assist with hypervolemia on 10/18 which was tolerated well. Pt was taken off BiPap, 02 Sats remained 100% on RA w/ residual crackles. Pt transferred to medicine in stable condition.  MEDICINE COURSE:  Pt remained HD stable w/ no signs of resp distress. Pt received HD again on 10/19 for continued hypervolemia, tolerated well. With use of Swiss , pt endorsed feeling at baseline. He continues to have mild diffuse insp crackles, no resp distress, satting at 100% RA. On 10/20, pt endorsed some R leg pain that resolved within 1 day, no recent trauma noted; hip xray was deferred as pain felt to be 2/2 cramping from awkward leg positioning at night. Pt received HD on 10/22/18 with removal of 3L fluid during UF prior to d/c. Given pt's repeated hospitalizations for volume overload, our nephrology team recommends that patient has at least 2kg fluid removed per dialysis       On day of discharge patient is medically and hemodynamically stable for discharge to [facility] 72 y/o Togolese speaking M with T2DM, HTN, CAD s/p 12-14 stents placed in 1990's, and BABAR x 5 placed in Sept 2017, CHF with EF 20%, AF (not a/c 2/2 GIB), CVA , ESRD on HD (MWF; last session 1 day PTA, receives midodrine, oliguric) COPD, bipolar disorder, dementia (AOX3) w/ frequent admissions for volume overload who p/w w/ worsening SOB x 1 wk and pleuritic CP x few days. Per daughter, denied recent fevers, productive cough, palpitations, abn pain, N/V/D/C. In the ED, pt was initially found tachypneic w/ increased WOB, so he was placed on BIPAP. A CT chest revealed large b/l pleural effusions with possible consolidations. Pt was given vanc, cefepime, cipro,and duonebs, then admitted to MICU for urgent HD on 10/18 to assist with hypervolemia.   MICU COURSE:  Pt had Urgent HD in the MICU to assist with hypervolemia on 10/18 which was tolerated well. Pt was taken off BiPap, 02 Sats remained 100% on RA w/ residual crackles. Pt transferred to medicine in stable condition.  MEDICINE COURSE:  Pt remained HD stable w/ no signs of resp distress. Pt received HD again on 10/19 for continued hypervolemia, tolerated well. With use of Togolese , pt endorsed feeling at baseline. He continues to have mild diffuse insp crackles, no resp distress, satting at 100% RA. On 10/20, pt endorsed some R leg pain that resolved within 1 day, no recent trauma noted; hip xray was deferred as pain is felt to be 2/2 cramping from awkward leg positioning at night. Pt received HD on 10/22/18 with removal of 3L fluid during UF prior to d/c. Given pt's repeated hospitalizations for volume overload, our nephrology team recommends that patient has at least 2kg fluid removed per dialysis. His discharge weight is 61kg, per nephrology team, pt's weight should be lowered further via HD.      On day of discharge patient is medically and hemodynamically stable for discharge to [facility] 72 y/o Tuvaluan speaking M with T2DM, HTN, CAD s/p 12-14 stents placed in 1990's, and BABAR x 5 placed in Sept 2017, CHF with EF 20%, AF (not a/c 2/2 GIB), CVA , ESRD on HD (MWF; last session 1 day PTA, receives midodrine, oliguric) COPD, bipolar disorder, dementia (AOX3) w/ frequent admissions for volume overload who p/w w/ worsening SOB x 1 wk and pleuritic CP x few days. Per daughter, denied recent fevers, productive cough, palpitations, abn pain, N/V/D/C. In the ED, pt was initially found tachypneic w/ increased WOB, so he was placed on BIPAP. A CT chest revealed large b/l pleural effusions with possible consolidations. Pt was given vanc, cefepime, cipro,and duonebs, then admitted to MICU for urgent HD on 10/18 to assist with hypervolemia.   MICU COURSE:  Pt had Urgent HD in the MICU to assist with hypervolemia on 10/18 which was tolerated well. Pt was taken off BiPap, 02 Sats remained 100% on RA w/ residual crackles. Pt transferred to medicine in stable condition.  MEDICINE COURSE:  Pt remained HD stable w/ no signs of resp distress. Pt received HD again on 10/19 for continued hypervolemia, tolerated well. With use of Tuvaluan , pt endorsed feeling at baseline. He continues to have mild diffuse insp crackles, no resp distress, satting at 100% RA. On 10/20, pt endorsed some R leg pain that resolved within 1 day, no recent trauma noted; hip xray neg for fracture/dislocation, pain is felt to be 2/2 cramping from awkward leg positioning at night, no pain on d/c. Pt received HD on 10/22/18 with removal of 3L fluid during UF prior to d/c. Given pt's repeated hospitalizations for volume overload, our nephrology team recommends that patient has at least 2kg fluid removed per dialysis. His discharge weight is 61kg, per nephrology team, pt's weight should be lowered further via HD.      On day of discharge patient is medically and hemodynamically stable for discharge to [facility].

## 2018-10-21 NOTE — PROVIDER CONTACT NOTE (OTHER) - RECOMMENDATIONS
Consider d/c Bipap as pt. is confused and has not been using Bipap. Respiratory status currently stable.

## 2018-10-21 NOTE — PROGRESS NOTE ADULT - PROBLEM SELECTOR PLAN 1
-no hematoma/bruising visible, no reports of fall noted; therefore, low suspicion for fracture or dislocation at this time  -may be 2/2 leg cramps from awkward positioning insisted by pt yesterday night  -will obtain R Hip xray to r/o posterior dislocation/fracture -no hematoma/bruising visible, no reports of fall noted; therefore, low suspicion for fracture or dislocation at this time  -may be 2/2 leg cramps from awkward positioning   -Hip xray pending

## 2018-10-21 NOTE — DISCHARGE NOTE ADULT - MEDICATION SUMMARY - MEDICATIONS TO TAKE
I will START or STAY ON the medications listed below when I get home from the hospital:    predniSONE 20 mg oral tablet  -- 1.5 tab(s) by mouth once a day  -- Indication: For Preventive measure    acetaminophen 325 mg oral tablet  -- 2 tab(s) by mouth every 6 hours, As needed, Severe Pain (7 - 10)  -- Indication: For Pain    aspirin 81 mg oral tablet, chewable  -- 1 tab(s) by mouth once a day  -- Indication: For Preventive measure    tamsulosin 0.4 mg oral capsule  -- 1 cap(s) by mouth once a day (at bedtime)  -- Indication: For Benign prostatic hyperplasia, unspecified whether lower urinary tract symptoms present    Cardura 1 mg oral tablet  -- 1 tab(s) by mouth once a day  -- Indication: For Benign prostatic hyperplasia, unspecified whether lower urinary tract symptoms present    amiodarone 200 mg oral tablet  -- 1 tab(s) by mouth once a day  -- Indication: For Chronic atrial fibrillation    atorvastatin 40 mg oral tablet  -- 1 tab(s) by mouth once a day (at bedtime)  -- Indication: For Hyperlipidemia, unspecified hyperlipidemia type    clopidogrel 75 mg oral tablet  -- 1 tab(s) by mouth once a day  -- Indication: For Preventive measure    busPIRone 5 mg oral tablet  -- 1 tab(s) by mouth once a day (at bedtime)  -- Indication: For Bipolar affective disorder, remission status unspecified    metoprolol tartrate 25 mg oral tablet  -- 1 tab(s) by mouth 2 times a day  -- Indication: For Chronic atrial fibrillation    ipratropium-albuterol 0.5 mg-2.5 mg/3 mLinhalation solution  -- 3 milliliter(s) inhaled every 6 hours, As needed, Shortness of Breath and/or Wheezing  -- Indication: For Chronic obstructive pulmonary disease, unspecified COPD type    Spiriva 18 mcg inhalation capsule  -- 1 cap(s) inhaled once a day  -- Indication: For Chronic obstructive pulmonary disease, unspecified COPD type    tiotropium 18 mcg inhalation capsule  -- 1 cap(s) inhaled once a day  -- Indication: For Chronic obstructive pulmonary disease, unspecified COPD type    albuterol 90 mcg/inh inhalation aerosol  -- 2 puff(s) inhaled every 6 hours  -- Indication: For Chronic obstructive pulmonary disease, unspecified COPD type    epoetin georgie  -- 2000 unit(s) injectable during dialysis  -- Indication: For ESRD (end stage renal disease) on dialysis    ferrous sulfate 324 mg (65 mg elemental iron) oral tablet  -- orally once a day  -- Indication: For ESRD (end stage renal disease) on dialysis    midodrine 2.5 mg oral tablet  -- 1 tab(s) by mouth 3 times a week  -- Indication: For ESRD (end stage renal disease) on dialysis    melatonin 3 mg oral tablet  -- 1 tab(s) by mouth once a day (at bedtime), As needed, Insomnia  -- Indication: For Bipolar affective disorder, remission status unspecified    Renvela 0.8 g oral powder for reconstitution  -- 1 packet(s) by mouth 3 times a day  -- Indication: For ESRD (end stage renal disease) on dialysis    Multiple Vitamins oral tablet  -- 1 tab(s) by mouth once a day  -- Indication: For Preventive measure I will START or STAY ON the medications listed below when I get home from the hospital:    acetaminophen 325 mg oral tablet  -- 2 tab(s) by mouth every 6 hours, As needed, Severe Pain (7 - 10)  -- Indication: For Pain    aspirin 81 mg oral tablet, chewable  -- 1 tab(s) by mouth once a day  -- Indication: For Preventive measure    tamsulosin 0.4 mg oral capsule  -- 1 cap(s) by mouth once a day (at bedtime)  -- Indication: For Benign prostatic hyperplasia, unspecified whether lower urinary tract symptoms present    Cardura 1 mg oral tablet  -- 1 tab(s) by mouth once a day  -- Indication: For Benign prostatic hyperplasia, unspecified whether lower urinary tract symptoms present    amiodarone 200 mg oral tablet  -- 1 tab(s) by mouth once a day  -- Indication: For Chronic atrial fibrillation    atorvastatin 40 mg oral tablet  -- 1 tab(s) by mouth once a day (at bedtime)  -- Indication: For Hyperlipidemia, unspecified hyperlipidemia type    clopidogrel 75 mg oral tablet  -- 1 tab(s) by mouth once a day  -- Indication: For Preventive measure    busPIRone 5 mg oral tablet  -- 1 tab(s) by mouth once a day (at bedtime)  -- Indication: For Bipolar affective disorder, remission status unspecified    metoprolol tartrate 25 mg oral tablet  -- 1 tab(s) by mouth 2 times a day  -- Indication: For Chronic atrial fibrillation    ipratropium-albuterol 0.5 mg-2.5 mg/3 mLinhalation solution  -- 3 milliliter(s) inhaled every 6 hours, As needed, Shortness of Breath and/or Wheezing  -- Indication: For Chronic obstructive pulmonary disease, unspecified COPD type    Spiriva 18 mcg inhalation capsule  -- 1 cap(s) inhaled once a day  -- Indication: For Chronic obstructive pulmonary disease, unspecified COPD type    tiotropium 18 mcg inhalation capsule  -- 1 cap(s) inhaled once a day  -- Indication: For Chronic obstructive pulmonary disease, unspecified COPD type    albuterol 90 mcg/inh inhalation aerosol  -- 2 puff(s) inhaled every 6 hours  -- Indication: For Chronic obstructive pulmonary disease, unspecified COPD type    epoetin georgie  -- 2000 unit(s) injectable during dialysis  -- Indication: For ESRD (end stage renal disease) on dialysis    ferrous sulfate 324 mg (65 mg elemental iron) oral tablet  -- orally once a day  -- Indication: For ESRD (end stage renal disease) on dialysis    midodrine 2.5 mg oral tablet  -- 1 tab(s) by mouth 3 times a week  -- Indication: For ESRD (end stage renal disease) on dialysis    melatonin 3 mg oral tablet  -- 1 tab(s) by mouth once a day (at bedtime), As needed, Insomnia  -- Indication: For Bipolar affective disorder, remission status unspecified    Renvela 0.8 g oral powder for reconstitution  -- 1 packet(s) by mouth 3 times a day  -- Indication: For ESRD (end stage renal disease) on dialysis    Multiple Vitamins oral tablet  -- 1 tab(s) by mouth once a day  -- Indication: For Preventive measure

## 2018-10-21 NOTE — PHYSICAL THERAPY INITIAL EVALUATION ADULT - PERTINENT HX OF CURRENT PROBLEM, REHAB EVAL
74 y/o Tajik speaking M with T2DM, HTN, CAD s/p 12-14 stents placed in 1990's, and BABAR x 5 placed in Sept 2017, CHF with EF 20%, AF (not a/c 2/2 GIB), CVA , ESRD on HD (MWF) COPD, bipolar disorder, p/w admitted for hypoxic respiratory failure 2/2 volume overload though to be 2/2 volume overload w/ possible PNA and COPD exacerbation. Pt improved on BIPAP, urgent HD, duonebs, stable to d/c pending R hip eval to r/o dislocation.

## 2018-10-21 NOTE — PHYSICAL THERAPY INITIAL EVALUATION ADULT - PLANNED THERAPY INTERVENTIONS, PT EVAL
balance training/bed mobility training/transfer training/ROM/strengthening/gait training/postural re-education

## 2018-10-21 NOTE — PROVIDER CONTACT NOTE (OTHER) - ASSESSMENT
Patient alert and verbally responsive with confusion. O2 sat 100% on room air. No SOB or s/s of respiratory distress.

## 2018-10-21 NOTE — PHYSICAL THERAPY INITIAL EVALUATION ADULT - ADDITIONAL COMMENTS
Patient left supine in bed as received, NAD, call bell in reach, all lines/devices intact, brother present.

## 2018-10-21 NOTE — DISCHARGE NOTE ADULT - COMMUNITY RESOURCES
[facility] [address] [place], [place]; [facility] for hemodialysis on Mon, Wed, and Fridays.

## 2018-10-21 NOTE — DISCHARGE NOTE ADULT - PLAN OF CARE
resolution You complained of pain in your right leg. An Xray of your hip showed ____. Please follow up with your doctor if your symptoms continue. Improved breathing You came in with difficult breathing. You were found to have increased fluid in your lungs, which we believe is due to a recent lung infection causing an exacerbation of your heart failure and chronic lung disease. You were given oxygen via a bipap machine, and started on urgent hemodialysis to remove the fluid. You tolerated the hemodialysis well, and your breathing improved significantly. Please follow up wit

## 2018-10-21 NOTE — DISCHARGE NOTE ADULT - PROVIDER TOKENS
TOKEN:'[id number]',TOKEN:'[id number]' TOKEN:'[id number]',FREE:[LAST:[[facility]],FIRST:[Critical access hospital],PHONE:[phone]:[phone]:[[address] [place] [address]]],FREE:[LAST:[Dialysis Center],PHONE:[(   )    -],FAX:[(   [phone]    -],ADDRESS:[[address] [place] [address]]]

## 2018-10-21 NOTE — DISCHARGE NOTE ADULT - CARE PLAN
Principal Discharge DX:	Acute on chronic systolic heart failure  Goal:	Improved breathing  Assessment and plan of treatment:	You came in with difficult breathing. You were found to have increased fluid in your lungs, which we believe is due to a recent lung infection causing an exacerbation of your heart failure and chronic lung disease. You were given oxygen via a bipap machine, and started on urgent hemodialysis to remove the fluid. You tolerated the hemodialysis well, and your breathing improved significantly. Please follow up wit  Secondary Diagnosis:	Right hip pain  Goal:	resolution  Assessment and plan of treatment:	You complained of pain in your right leg. An Xray of your hip showed ____. Please follow up with your doctor if your symptoms continue.

## 2018-10-21 NOTE — PHYSICAL THERAPY INITIAL EVALUATION ADULT - GENERAL OBSERVATIONS, REHAB EVAL
Consult received, chart reviewed. Patient received supine in bed, NAD. Patient agreed to EVALUATION from Physical Therapist. Consult received, chart reviewed. Patient received supine in bed, brother present for translation, NAD. Patient agreed to EVALUATION from Physical Therapist.

## 2018-10-21 NOTE — DISCHARGE NOTE ADULT - CONDITION (STATED IN TERMS THAT PERMIT A SPECIFIC MEASURABLE COMPARISON WITH CONDITION ON ADMISSION):
On day of discharge patient is medically and hemodynamically stable for discharge to [facility],

## 2018-10-21 NOTE — PROGRESS NOTE ADULT - PROBLEM SELECTOR PLAN 3
-TTE shows improved EF 35% in 9/18 compared to 9/17 (20%), global LV dysfunction w/ mild-mod mitral thickening +regurg    Atrial fibrillation  -on metoprolol tartrate 25 BID, amiodarone 200mg  -off a/c 2/2 GIB on a previous admission; discussion was held between cards, prim team and GI, and risks of bleed det to be greater than benefit, d/c withheld

## 2018-10-21 NOTE — PROGRESS NOTE ADULT - SUBJECTIVE AND OBJECTIVE BOX
Patient is a 73y old  Male who presents with a chief complaint of hypoxic respiratory failure (20 Oct 2018 10:19)      SUBJECTIVE / OVERNIGHT EVENTS:  Patient seen and examined at bedside. No acute events overnight.     MEDICATIONS  (STANDING):  ALBUTerol/ipratropium for Nebulization 3 milliLiter(s) Nebulizer every 6 hours  amiodarone    Tablet 200 milliGRAM(s) Oral daily  amoxicillin  250 milliGRAM(s)/clavulanate 1 Tablet(s) Oral every 24 hours  aspirin  chewable 81 milliGRAM(s) Oral daily  atorvastatin 40 milliGRAM(s) Oral at bedtime  azithromycin   Tablet 500 milliGRAM(s) Oral every 24 hours  busPIRone 5 milliGRAM(s) Oral <User Schedule>  clopidogrel Tablet 75 milliGRAM(s) Oral daily  doxazosin 1 milliGRAM(s) Oral at bedtime  ferrous    sulfate 325 milliGRAM(s) Oral daily  heparin  Injectable 5000 Unit(s) SubCutaneous every 8 hours  insulin lispro (HumaLOG) corrective regimen sliding scale   SubCutaneous three times a day before meals  insulin lispro (HumaLOG) corrective regimen sliding scale   SubCutaneous at bedtime  metoprolol tartrate 25 milliGRAM(s) Oral two times a day  midodrine 2.5 milliGRAM(s) Oral <User Schedule>  sevelamer carbonate Powder 800 milliGRAM(s) Oral three times a day with meals  tiotropium 18 MICROgram(s) Capsule 1 Capsule(s) Inhalation daily    MEDICATIONS  (PRN):  melatonin 3 milliGRAM(s) Oral at bedtime PRN Insomnia      Vital Signs Last 24 Hrs  T(C): 36.6 (21 Oct 2018 05:18), Max: 36.8 (20 Oct 2018 14:01)  T(F): 97.8 (21 Oct 2018 05:18), Max: 98.3 (20 Oct 2018 14:01)  HR: 86 (21 Oct 2018 05:18) (69 - 91)  BP: 121/56 (21 Oct 2018 05:18) (121/56 - 145/76)  BP(mean): --  RR: 18 (21 Oct 2018 05:18) (16 - 18)  SpO2: 99% (21 Oct 2018 05:18) (96% - 100%)  CAPILLARY BLOOD GLUCOSE      POCT Blood Glucose.: 132 mg/dL (21 Oct 2018 08:02)  POCT Blood Glucose.: 183 mg/dL (20 Oct 2018 22:23)  POCT Blood Glucose.: 208 mg/dL (20 Oct 2018 17:10)  POCT Blood Glucose.: 115 mg/dL (20 Oct 2018 12:05)    I&O's Summary    20 Oct 2018 07:01  -  21 Oct 2018 07:00  --------------------------------------------------------  IN: 360 mL / OUT: 0 mL / NET: 360 mL        PHYSICAL EXAM:  GENERAL: NAD, well-developed  HEAD:  Atraumatic, Normocephalic  EYES: EOMI,  conjunctiva and sclera clear  NECK: Supple  CHEST/LUNG: Clear to auscultation bilaterally; No wheeze  HEART: Regular rate and rhythm; No murmurs, rubs, or gallops  ABDOMEN: Soft, Nontender, Nondistended; Bowel sounds present  EXTREMITIES:  No clubbing, cyanosis, or edema  PSYCH: AAOx3  NEUROLOGY: non-focal  SKIN: No rashes or lesions    LABS:    WBC Trend: 4.11<--, 9.79<--  10-20    142  |  100  |  18  ----------------------------<  119<H>  3.2<L>   |  28  |  2.76<H>    Ca    9.1      20 Oct 2018 06:30  Phos  2.3     10-20  Mg     2.2     10-20      Creatinine Trend: 2.76<--, 1.91<--, 2.93<--              RADIOLOGY & ADDITIONAL TESTS:    Imaging Personally Reviewed:    Consultant(s) Notes Reviewed:      Care Discussed with Consultants/Other Providers: Patient is a 73y old  Male who presents with a chief complaint of hypoxic respiratory failure (20 Oct 2018 10:19)      SUBJECTIVE / OVERNIGHT EVENTS:  Patient seen and examined at bedside. No acute events overnight. Patient endorses feeling well. States that he no longer has hip pain. Reports no CP/SOB/abd pain. Last BM was today and it was normal.     MEDICATIONS  (STANDING):  ALBUTerol/ipratropium for Nebulization 3 milliLiter(s) Nebulizer every 6 hours  amiodarone    Tablet 200 milliGRAM(s) Oral daily  amoxicillin  250 milliGRAM(s)/clavulanate 1 Tablet(s) Oral every 24 hours  aspirin  chewable 81 milliGRAM(s) Oral daily  atorvastatin 40 milliGRAM(s) Oral at bedtime  azithromycin   Tablet 500 milliGRAM(s) Oral every 24 hours  busPIRone 5 milliGRAM(s) Oral <User Schedule>  clopidogrel Tablet 75 milliGRAM(s) Oral daily  doxazosin 1 milliGRAM(s) Oral at bedtime  ferrous    sulfate 325 milliGRAM(s) Oral daily  heparin  Injectable 5000 Unit(s) SubCutaneous every 8 hours  insulin lispro (HumaLOG) corrective regimen sliding scale   SubCutaneous three times a day before meals  insulin lispro (HumaLOG) corrective regimen sliding scale   SubCutaneous at bedtime  metoprolol tartrate 25 milliGRAM(s) Oral two times a day  midodrine 2.5 milliGRAM(s) Oral <User Schedule>  sevelamer carbonate Powder 800 milliGRAM(s) Oral three times a day with meals  tiotropium 18 MICROgram(s) Capsule 1 Capsule(s) Inhalation daily    MEDICATIONS  (PRN):  melatonin 3 milliGRAM(s) Oral at bedtime PRN Insomnia      Vital Signs Last 24 Hrs  T(C): 36.6 (21 Oct 2018 05:18), Max: 36.8 (20 Oct 2018 14:01)  T(F): 97.8 (21 Oct 2018 05:18), Max: 98.3 (20 Oct 2018 14:01)  HR: 86 (21 Oct 2018 05:18) (69 - 91)  BP: 121/56 (21 Oct 2018 05:18) (121/56 - 145/76)  BP(mean): --  RR: 18 (21 Oct 2018 05:18) (16 - 18)  SpO2: 99% (21 Oct 2018 05:18) (96% - 100%)  CAPILLARY BLOOD GLUCOSE      POCT Blood Glucose.: 132 mg/dL (21 Oct 2018 08:02)  POCT Blood Glucose.: 183 mg/dL (20 Oct 2018 22:23)  POCT Blood Glucose.: 208 mg/dL (20 Oct 2018 17:10)  POCT Blood Glucose.: 115 mg/dL (20 Oct 2018 12:05)    I&O's Summary    20 Oct 2018 07:01  -  21 Oct 2018 07:00  --------------------------------------------------------  IN: 360 mL / OUT: 0 mL / NET: 360 mL      PHYSICAL EXAM:  GENERAL: NAD,  HEAD: Atraumatic, Normocephalic  EYES: EOMI, PERRLA, conjunctiva and sclera clear  NECK: Supple, No JVD  CHEST/LUNG: clear to auscultation, No wheezes noted. No intercostal muscle retractions  HEART: Regular rate and rhythm;  ABDOMEN: Soft, Nontender, large, soft, reducible protrusion on L w/ +BS; Bowel sounds present  EXTREMITIES:  2+ dP pulses b/l, No clubbing, cyanosis, or edema. R hip internally rotated w/ adduction, hast L leg muscle waisting   PSYCH: reactive affect  NEUROLOGY: AAOx3, non-focal  SKIN: No rashes or lesions    LABS:    WBC Trend: 4.11<--, 9.79<--  10-20    142  |  100  |  18  ----------------------------<  119<H>  3.2<L>   |  28  |  2.76<H>    Ca    9.1      20 Oct 2018 06:30  Phos  2.3     10-20  Mg     2.2     10-20      Creatinine Trend: 2.76<--, 1.91<--, 2.93<--              RADIOLOGY & ADDITIONAL TESTS:    Imaging Personally Reviewed:    Consultant(s) Notes Reviewed:      Care Discussed with Consultants/Other Providers:

## 2018-10-21 NOTE — DISCHARGE NOTE ADULT - CARE PROVIDER_API CALL
Sam Salcido), Internal Medicine  [address] [place] [address]  Phone: [phone]  Fax: [phone]    Reid Martinez (MD), Cardiovascular Disease; Internal Medicine; Nuclear Cardiology  [address] [place] [address]  Phone: [phone]  Fax: [phone] Reid Martinez), Cardiovascular Disease; Internal Medicine; Nuclear Cardiology  [address] [place] [address]  Phone: [phone]  Fax: [phone]    Ivana Montano  [address] [place] [address]  Phone: [phone]  Fax: [phone]    [facility],   [address] [place] [id number]  Phone: (   )    -  Fax: (   )    -

## 2018-10-22 DIAGNOSIS — I48.2 CHRONIC ATRIAL FIBRILLATION: ICD-10-CM

## 2018-10-22 LAB
BUN SERPL-MCNC: 49 MG/DL — HIGH (ref 7–23)
CALCIUM SERPL-MCNC: 8.9 MG/DL — SIGNIFICANT CHANGE UP (ref 8.4–10.5)
CHLORIDE SERPL-SCNC: 101 MMOL/L — SIGNIFICANT CHANGE UP (ref 98–107)
CO2 SERPL-SCNC: 26 MMOL/L — SIGNIFICANT CHANGE UP (ref 22–31)
CREAT SERPL-MCNC: 5.28 MG/DL — HIGH (ref 0.5–1.3)
GLUCOSE BLDC GLUCOMTR-MCNC: 116 MG/DL — HIGH (ref 70–99)
GLUCOSE SERPL-MCNC: 138 MG/DL — HIGH (ref 70–99)
HCT VFR BLD CALC: 27.9 % — LOW (ref 39–50)
HCT VFR BLD CALC: 34.4 % — LOW (ref 39–50)
HGB BLD-MCNC: 10.1 G/DL — LOW (ref 13–17)
HGB BLD-MCNC: 8.1 G/DL — LOW (ref 13–17)
MAGNESIUM SERPL-MCNC: 2 MG/DL — SIGNIFICANT CHANGE UP (ref 1.6–2.6)
MCHC RBC-ENTMCNC: 27.2 PG — SIGNIFICANT CHANGE UP (ref 27–34)
MCHC RBC-ENTMCNC: 27.5 PG — SIGNIFICANT CHANGE UP (ref 27–34)
MCHC RBC-ENTMCNC: 29 % — LOW (ref 32–36)
MCHC RBC-ENTMCNC: 29.4 % — LOW (ref 32–36)
MCV RBC AUTO: 93.6 FL — SIGNIFICANT CHANGE UP (ref 80–100)
MCV RBC AUTO: 93.7 FL — SIGNIFICANT CHANGE UP (ref 80–100)
NRBC # FLD: 0 — SIGNIFICANT CHANGE UP
NRBC # FLD: 0 — SIGNIFICANT CHANGE UP
PHOSPHATE SERPL-MCNC: 3.8 MG/DL — SIGNIFICANT CHANGE UP (ref 2.5–4.5)
PLATELET # BLD AUTO: 169 K/UL — SIGNIFICANT CHANGE UP (ref 150–400)
PLATELET # BLD AUTO: 279 K/UL — SIGNIFICANT CHANGE UP (ref 150–400)
PMV BLD: 10.7 FL — SIGNIFICANT CHANGE UP (ref 7–13)
PMV BLD: 9.8 FL — SIGNIFICANT CHANGE UP (ref 7–13)
POTASSIUM SERPL-MCNC: 3.7 MMOL/L — SIGNIFICANT CHANGE UP (ref 3.5–5.3)
POTASSIUM SERPL-SCNC: 3.7 MMOL/L — SIGNIFICANT CHANGE UP (ref 3.5–5.3)
RBC # BLD: 2.98 M/UL — LOW (ref 4.2–5.8)
RBC # BLD: 3.67 M/UL — LOW (ref 4.2–5.8)
RBC # FLD: 15.6 % — HIGH (ref 10.3–14.5)
RBC # FLD: 15.6 % — HIGH (ref 10.3–14.5)
SODIUM SERPL-SCNC: 141 MMOL/L — SIGNIFICANT CHANGE UP (ref 135–145)
WBC # BLD: 6.77 K/UL — SIGNIFICANT CHANGE UP (ref 3.8–10.5)
WBC # BLD: 6.98 K/UL — SIGNIFICANT CHANGE UP (ref 3.8–10.5)
WBC # FLD AUTO: 6.77 K/UL — SIGNIFICANT CHANGE UP (ref 3.8–10.5)
WBC # FLD AUTO: 6.98 K/UL — SIGNIFICANT CHANGE UP (ref 3.8–10.5)

## 2018-10-22 PROCEDURE — 90935 HEMODIALYSIS ONE EVALUATION: CPT | Mod: GC

## 2018-10-22 PROCEDURE — 73502 X-RAY EXAM HIP UNI 2-3 VIEWS: CPT | Mod: 26,RT

## 2018-10-22 PROCEDURE — 99233 SBSQ HOSP IP/OBS HIGH 50: CPT | Mod: GC

## 2018-10-22 RX ADMIN — HEPARIN SODIUM 5000 UNIT(S): 5000 INJECTION INTRAVENOUS; SUBCUTANEOUS at 13:45

## 2018-10-22 RX ADMIN — CLOPIDOGREL BISULFATE 75 MILLIGRAM(S): 75 TABLET, FILM COATED ORAL at 11:24

## 2018-10-22 RX ADMIN — Medication 3 MILLILITER(S): at 21:00

## 2018-10-22 RX ADMIN — SEVELAMER CARBONATE 800 MILLIGRAM(S): 2400 POWDER, FOR SUSPENSION ORAL at 17:43

## 2018-10-22 RX ADMIN — Medication 25 MILLIGRAM(S): at 17:43

## 2018-10-22 RX ADMIN — HEPARIN SODIUM 5000 UNIT(S): 5000 INJECTION INTRAVENOUS; SUBCUTANEOUS at 05:48

## 2018-10-22 RX ADMIN — Medication 81 MILLIGRAM(S): at 11:24

## 2018-10-22 RX ADMIN — TIOTROPIUM BROMIDE 1 CAPSULE(S): 18 CAPSULE ORAL; RESPIRATORY (INHALATION) at 11:24

## 2018-10-22 RX ADMIN — HEPARIN SODIUM 5000 UNIT(S): 5000 INJECTION INTRAVENOUS; SUBCUTANEOUS at 21:54

## 2018-10-22 RX ADMIN — SEVELAMER CARBONATE 800 MILLIGRAM(S): 2400 POWDER, FOR SUSPENSION ORAL at 11:25

## 2018-10-22 RX ADMIN — Medication 3 MILLILITER(S): at 04:12

## 2018-10-22 RX ADMIN — Medication 325 MILLIGRAM(S): at 11:24

## 2018-10-22 RX ADMIN — ATORVASTATIN CALCIUM 40 MILLIGRAM(S): 80 TABLET, FILM COATED ORAL at 21:53

## 2018-10-22 RX ADMIN — Medication 1 MILLIGRAM(S): at 21:53

## 2018-10-22 RX ADMIN — AMIODARONE HYDROCHLORIDE 200 MILLIGRAM(S): 400 TABLET ORAL at 05:49

## 2018-10-22 RX ADMIN — Medication 5 MILLIGRAM(S): at 21:53

## 2018-10-22 NOTE — PROGRESS NOTE ADULT - ASSESSMENT
72 y/o Canadian speaking M with CAD s/p 12-14 stents placed in 1990's, and BABAR x 5 placed in Sept 2017, CHF with EF 20%, DM, HTN, HLD, AF on coumadin, CVA , ESRD on HD (MWF) COPD, bipolar disorder, dementia admitted for evaluation of SOB.

## 2018-10-22 NOTE — PROGRESS NOTE ADULT - SUBJECTIVE AND OBJECTIVE BOX
***************************************************************  Jayla Kulkarni PGY1  Internal Medicine   pager [phone]  ***************************************************************    JIMI BUCHANAN  73y  MRN: [id number]    Patient is a 73y old  Male who presents with a chief complaint of hypoxic respiratory failure (21 Oct 2018 20:20)      Subjective: no events ON. Pt examined at HD, Sleepy but denies fever, CP, SOB, abn pain, N/V, dysuria, leg pain. Tolerating diet.      MEDICATIONS  (STANDING):  ALBUTerol/ipratropium for Nebulization 3 milliLiter(s) Nebulizer every 6 hours  amiodarone    Tablet 200 milliGRAM(s) Oral daily  amoxicillin  250 milliGRAM(s)/clavulanate 1 Tablet(s) Oral every 24 hours  aspirin  chewable 81 milliGRAM(s) Oral daily  atorvastatin 40 milliGRAM(s) Oral at bedtime  azithromycin   Tablet 500 milliGRAM(s) Oral every 24 hours  busPIRone 5 milliGRAM(s) Oral <User Schedule>  clopidogrel Tablet 75 milliGRAM(s) Oral daily  doxazosin 1 milliGRAM(s) Oral at bedtime  ferrous    sulfate 325 milliGRAM(s) Oral daily  heparin  Injectable 5000 Unit(s) SubCutaneous every 8 hours  insulin lispro (HumaLOG) corrective regimen sliding scale   SubCutaneous three times a day before meals  insulin lispro (HumaLOG) corrective regimen sliding scale   SubCutaneous at bedtime  metoprolol tartrate 25 milliGRAM(s) Oral two times a day  midodrine 2.5 milliGRAM(s) Oral <User Schedule>  sevelamer carbonate Powder 800 milliGRAM(s) Oral three times a day with meals  tiotropium 18 MICROgram(s) Capsule 1 Capsule(s) Inhalation daily    MEDICATIONS  (PRN):  melatonin 3 milliGRAM(s) Oral at bedtime PRN Insomnia      Objective:    Vitals: Vital Signs Last 24 Hrs  T(C): 36.8 (10-22-18 @ 06:50), Max: 36.8 (10-22-18 @ 06:50)  T(F): 98.2 (10-22-18 @ 06:50), Max: 98.2 (10-22-18 @ 06:50)  HR: 77 (10-22-18 @ 06:50) (70 - 87)  BP: 144/66 (10-22-18 @ 06:50) (125/67 - 144/66)  BP(mean): --  RR: 18 (10-22-18 @ 06:50) (17 - 18)  SpO2: 97% (10-22-18 @ 04:48) (97% - 100%)              I&O's Summary    21 Oct 2018 07:01  -  22 Oct 2018 07:00  --------------------------------------------------------  IN: 598 mL / OUT: 100 mL / NET: 498 mL        PHYSICAL EXAM:  GENERAL: NAD,  HEAD: Atraumatic, Normocephalic  EYES: EOMI, PERRLA, conjunctiva and sclera clear  NECK: Supple, No JVD  CHEST/LUNG:   HEART: Regular rate and rhythm;  ABDOMEN: Soft, Nontender, large, soft, reducible protrusion on L w/ +BS; Bowel sounds present  EXTREMITIES:  2+ dP pulses b/l, No clubbing, cyanosis, or edema. R hip internally rotated w/ adduction, hast L leg muscle waisting   PSYCH: reactive affect  NEUROLOGY: AAOx3, non-focal  SKIN: No rashes or lesions    LABS:                        8.1    6.77  )-----------( 279      ( 22 Oct 2018 06:30 )             27.9                         9.1    8.85  )-----------( 303      ( 21 Oct 2018 07:45 )             31.6     10-22    141  |  101  |  49<H>  ----------------------------<  138<H>  3.7   |  26  |  5.28<H>  10-21    145  |  102  |  41<H>  ----------------------------<  135<H>  3.7   |  27  |  4.51<H>  10-20    142  |  100  |  18  ----------------------------<  119<H>  3.2<L>   |  28  |  2.76<H>    Ca    8.9      22 Oct 2018 06:30  Ca    9.1      21 Oct 2018 07:45  Ca    9.1      20 Oct 2018 06:30  Phos  3.8     10-22  Mg     2.0     10-22                        CAPILLARY BLOOD GLUCOSE      POCT Blood Glucose.: 116 mg/dL (22 Oct 2018 08:21)  POCT Blood Glucose.: 137 mg/dL (22 Oct 2018 06:02)  POCT Blood Glucose.: 141 mg/dL (21 Oct 2018 22:29)  POCT Blood Glucose.: 193 mg/dL (21 Oct 2018 17:23)  POCT Blood Glucose.: 146 mg/dL (21 Oct 2018 11:50)    RADIOLOGY & ADDITIONAL TESTS:  Imaging Personally Reviewed:  [x ] YES  [ ] NO  Consultants involved in case:   Consultant(s) Notes Reviewed:  [ x] YES  [ ] NO:   Care Discussed with Consultants/Other Providers [x ] YES  [ ] NO ***************************************************************  Jayla Kulkarni PGY1  Internal Medicine   pager [phone]  ***************************************************************    JIMI BUCHANAN  73y  MRN: [id number]    Patient is a 73y old  Male who presents with a chief complaint of hypoxic respiratory failure (21 Oct 2018 20:20)      Subjective: no events ON. Pt examined at HD, Sleepy but denies fever, CP, SOB, abn pain, N/V, dysuria, leg pain. Tolerating diet.      MEDICATIONS  (STANDING):  ALBUTerol/ipratropium for Nebulization 3 milliLiter(s) Nebulizer every 6 hours  amiodarone    Tablet 200 milliGRAM(s) Oral daily  amoxicillin  250 milliGRAM(s)/clavulanate 1 Tablet(s) Oral every 24 hours  aspirin  chewable 81 milliGRAM(s) Oral daily  atorvastatin 40 milliGRAM(s) Oral at bedtime  azithromycin   Tablet 500 milliGRAM(s) Oral every 24 hours  busPIRone 5 milliGRAM(s) Oral <User Schedule>  clopidogrel Tablet 75 milliGRAM(s) Oral daily  doxazosin 1 milliGRAM(s) Oral at bedtime  ferrous    sulfate 325 milliGRAM(s) Oral daily  heparin  Injectable 5000 Unit(s) SubCutaneous every 8 hours  insulin lispro (HumaLOG) corrective regimen sliding scale   SubCutaneous three times a day before meals  insulin lispro (HumaLOG) corrective regimen sliding scale   SubCutaneous at bedtime  metoprolol tartrate 25 milliGRAM(s) Oral two times a day  midodrine 2.5 milliGRAM(s) Oral <User Schedule>  sevelamer carbonate Powder 800 milliGRAM(s) Oral three times a day with meals  tiotropium 18 MICROgram(s) Capsule 1 Capsule(s) Inhalation daily    MEDICATIONS  (PRN):  melatonin 3 milliGRAM(s) Oral at bedtime PRN Insomnia      Objective:    Vitals: Vital Signs Last 24 Hrs  T(C): 36.8 (10-22-18 @ 06:50), Max: 36.8 (10-22-18 @ 06:50)  T(F): 98.2 (10-22-18 @ 06:50), Max: 98.2 (10-22-18 @ 06:50)  HR: 77 (10-22-18 @ 06:50) (70 - 87)  BP: 144/66 (10-22-18 @ 06:50) (125/67 - 144/66)  BP(mean): --  RR: 18 (10-22-18 @ 06:50) (17 - 18)  SpO2: 97% (10-22-18 @ 04:48) (97% - 100%)              I&O's Summary    21 Oct 2018 07:01  -  22 Oct 2018 07:00  --------------------------------------------------------  IN: 598 mL / OUT: 100 mL / NET: 498 mL        PHYSICAL EXAM:  GENERAL: NAD, laying in bed currently receiving HD, pt sleepy but arousable  HEAD: Atraumatic, Normocephalic  EYES: EOMI, PERRLA, conjunctiva and sclera clear  NECK: Supple, No JVD  CHEST/LUNG: v faint crackles diffusely, significantly improved from prior  HEART: Regular rate and rhythm;  ABDOMEN: Soft, Nontender, large, soft, reducible protrusion on L w/ +BS; Bowel sounds present  EXTREMITIES:  2+ dP pulses b/l, No clubbing, cyanosis, or edema. R hip internally rotated w/ adduction, hast L leg muscle wasting   PSYCH: reactive affect  NEUROLOGY: AAOx3, non-focal  SKIN: No rashes or lesions    LABS:                        8.1    6.77  )-----------( 279      ( 22 Oct 2018 06:30 )             27.9                         9.1    8.85  )-----------( 303      ( 21 Oct 2018 07:45 )             31.6     10-22    141  |  101  |  49<H>  ----------------------------<  138<H>  3.7   |  26  |  5.28<H>  10-21    145  |  102  |  41<H>  ----------------------------<  135<H>  3.7   |  27  |  4.51<H>  10-20    142  |  100  |  18  ----------------------------<  119<H>  3.2<L>   |  28  |  2.76<H>    Ca    8.9      22 Oct 2018 06:30  Ca    9.1      21 Oct 2018 07:45  Ca    9.1      20 Oct 2018 06:30  Phos  3.8     10-22  Mg     2.0     10-22                        CAPILLARY BLOOD GLUCOSE      POCT Blood Glucose.: 116 mg/dL (22 Oct 2018 08:21)  POCT Blood Glucose.: 137 mg/dL (22 Oct 2018 06:02)  POCT Blood Glucose.: 141 mg/dL (21 Oct 2018 22:29)  POCT Blood Glucose.: 193 mg/dL (21 Oct 2018 17:23)  POCT Blood Glucose.: 146 mg/dL (21 Oct 2018 11:50)    RADIOLOGY & ADDITIONAL TESTS:  Imaging Personally Reviewed:  [x ] YES  [ ] NO  Consultants involved in case:   Consultant(s) Notes Reviewed:  [ x] YES  [ ] NO:   Care Discussed with Consultants/Other Providers [x ] YES  [ ] NO

## 2018-10-22 NOTE — PROGRESS NOTE ADULT - ASSESSMENT
74 y/o Azerbaijani speaking M with T2DM, HTN, CAD s/p 12-14 stents placed in 1990's, and BABAR x 5 placed in Sept 2017, CHF with EF 20%, AF (not a/c 2/2 GIB), CVA , ESRD on HD (MWF) COPD, bipolar disorder, p/w admitted for hypoxic respiratory failure 2/2 volume overload though to be 2/2 volume overload w/ possible PNA and COPD exacerbation. Pt improved on BIPAP, urgent HD, duonebs. Stable for d/c today to [facility].

## 2018-10-22 NOTE — PROGRESS NOTE ADULT - SUBJECTIVE AND OBJECTIVE BOX
[facility] Division of Kidney Diseases & Hypertension  HEMODIALYSIS NOTE  [phone]--------------------------------------------------------------------------------  Chief Complaint: ESRD/Ongoing hemodialysis requirement    24 hour events/subjective:  Patient seen and examined while on HD this AM. Tolerating it well, no complaints.       PAST HISTORY  --------------------------------------------------------------------------------  No significant changes to PMH, PSH, FHx, SHx, unless otherwise noted    ALLERGIES & MEDICATIONS  --------------------------------------------------------------------------------  Allergies    No Known Allergies    Intolerances      Standing Inpatient Medications  ALBUTerol/ipratropium for Nebulization 3 milliLiter(s) Nebulizer every 6 hours  amiodarone    Tablet 200 milliGRAM(s) Oral daily  aspirin  chewable 81 milliGRAM(s) Oral daily  atorvastatin 40 milliGRAM(s) Oral at bedtime  busPIRone 5 milliGRAM(s) Oral <User Schedule>  clopidogrel Tablet 75 milliGRAM(s) Oral daily  doxazosin 1 milliGRAM(s) Oral at bedtime  ferrous    sulfate 325 milliGRAM(s) Oral daily  heparin  Injectable 5000 Unit(s) SubCutaneous every 8 hours  insulin lispro (HumaLOG) corrective regimen sliding scale   SubCutaneous three times a day before meals  insulin lispro (HumaLOG) corrective regimen sliding scale   SubCutaneous at bedtime  metoprolol tartrate 25 milliGRAM(s) Oral two times a day  midodrine 2.5 milliGRAM(s) Oral <User Schedule>  sevelamer carbonate Powder 800 milliGRAM(s) Oral three times a day with meals  tiotropium 18 MICROgram(s) Capsule 1 Capsule(s) Inhalation daily    PRN Inpatient Medications  melatonin 3 milliGRAM(s) Oral at bedtime PRN      REVIEW OF SYSTEMS  --------------------------------------------------------------------------------  Gen: No weakness  Skin: No rashes  Head/Eyes/Ears/Mouth: No headache  Respiratory: + dyspnea  CV: No chest pain, PND, orthopnea  GI: No abdominal pain, diarrhea  : No increased frequency  MSK: No edema  Neuro: No dizziness/lightheadedness    All other systems were reviewed and are negative, except as noted.    VITALS/PHYSICAL EXAM  --------------------------------------------------------------------------------  T(C): 37 (10-22-18 @ 12:19), Max: 37 (10-22-18 @ 12:19)  HR: 72 (10-22-18 @ 12:19) (70 - 87)  BP: 116/58 (10-22-18 @ 12:19) (116/58 - 144/66)  RR: 18 (10-22-18 @ 12:19) (17 - 18)  SpO2: 100% (10-22-18 @ 12:19) (97% - 100%)  Wt(kg): --        10-21-18 @ 07:01  -  10-22-18 @ 07:00  --------------------------------------------------------  IN: 598 mL / OUT: 100 mL / NET: 498 mL    10-22-18 @ 07:01  -  10-22-18 @ 12:48  --------------------------------------------------------  IN: 400 mL / OUT: 3400 mL / NET: -3000 mL      Physical Exam:  	Gen: NAD  	Pulm: Decrease BS B/L  	CV: RRR, S1S2; no rub  	Abd: +BS, soft, nontender/nondistended  	UE: Warm, no asterixis  	LE: Warm, no edema  	Psych: Calm  	Skin: Warm, without rashes  	Vascular access: Right UE AVF present; thrill and bruit heard.     LABS/STUDIES  --------------------------------------------------------------------------------              8.1    6.77  >-----------<  279      [10-22-18 @ 06:30]              27.9     141  |  101  |  49  ----------------------------<  138      [10-22-18 @ 06:30]  3.7   |  26  |  5.28        Ca     8.9     [10-22-18 @ 06:30]      Mg     2.0     [10-22-18 @ 06:30]      Phos  3.8     [10-22-18 @ 06:30]            TSH 3.98      [10-19-18 @ 02:42]    HBsAb <3.0      [10-18-18 @ 18:00]  HBsAg NEGATIVE      [10-18-18 @ 18:00]  HBcAb Nonreactive      [10-18-18 @ 18:00]  HCV 0.23, Nonreactive Hepatitis C AB  S/CO Ratio                        Interpretation  < 1.0                                     Non-Reactive  1.0 - 4.9                           Weakly-Reactive  > 5.0                                 Reactive  Non-Reactive: Aperson with a non-reactive HCV antibody  result is considered uninfected.  No further action is  needed unless recent infection is suspected.  In these  cases, consider repeat testing later to detect  seroconversion..  Weakly-Reactive: HCV antibody test is abnormal, HCV RNA  Qualitative test will follow.  Reactive: HCV antibody test is abnormal, HCV RNA  Qualitative test will follow.  Note: HCV antibody testing is performed on the [employer]   system.      [10-18-18 @ 18:00]

## 2018-10-22 NOTE — PROGRESS NOTE ADULT - PROBLEM SELECTOR PLAN 4
on HD MWF; R AVF intact +thrill/bruit  -c/w ferrous sulfate 325mg qd  -EPO 2000U at HD  -c/w sevelamir 800 mg TID  -nephro recs appreciated on HD MWF; R AVF intact +thrill/bruit  -c/w ferrous sulfate 325mg qd  -EPO 2000U at HD  -c/w sevelamir 800 mg TID  would appreciate in-house renal recommendations for outpatient HD provider regarding increased fluid removal during HD

## 2018-10-22 NOTE — PROGRESS NOTE ADULT - PROBLEM SELECTOR PLAN 1
Pt with clinical and CT scan evidence of hypervolemia. Pt admitted to the MICU and received urgent HD for hypervolemia on 10/18. Patient has had UF with HD to help with volume removal. Will cont to do so with HD. Next session is planned for Wednesday.

## 2018-10-22 NOTE — PROGRESS NOTE ADULT - PROBLEM SELECTOR PLAN 3
well controlled  -on metoprolol tartrate 25 BID, amiodarone 200mg  -off a/c 2/2 GIB on a previous admission; discussion was held between cards, prim team and GI, and risks of bleed det to be greater than benefit; therefore, withheld

## 2018-10-22 NOTE — PROGRESS NOTE ADULT - PROBLEM SELECTOR PLAN 2
-no hematoma/bruising visible, no reports of fall noted; therefore, low suspicion for fracture or dislocation at this time  -may be 2/2 leg cramps from awkward positioning  -Hip xray pending; however, given pt denies further sx, can hold off for outpt re-eval -resolved, per pt  -no hematoma/bruising visible, no reports of fall noted; therefore, low suspicion for fracture or dislocation at this time  -may be 2/2 leg cramps from awkward positioning  -given pt denies further sx, can hold off hip xray for outpt re-eval

## 2018-10-22 NOTE — PROGRESS NOTE ADULT - PROBLEM SELECTOR PLAN 1
-TTE shows improved EF 35% in 9/18 compared to 9/17 (20%), global LV dysfunction w/ mild-mod mitral thickening +regurg  -possibly 2/2 recent asp PNA, currently on azithromycin 500mg daily, augmentin 250 mg (renally dosed) -possibly 2/2 recent asp PNA, now s/p 5d of azithromycin 500mg daily, augmentin 250 mg (renally dosed) and urgent HD 2/2 volume overload w/ significant improvement in clinical status  -TTE shows improved EF 35% in 9/18 compared to 9/17 (20%), global LV dysfunction w/ mild-mod mitral thickening +regurg  -will d/c abx now that pt has shown clinical improvement on 5-day course  -recurrent hospitalizations for volume may be 2/2 insufficient UF during HD; would appreciate in-house renal recommendations for outpatient HD provider regarding increased fluid removal during HD

## 2018-10-23 VITALS
TEMPERATURE: 98 F | HEART RATE: 71 BPM | SYSTOLIC BLOOD PRESSURE: 140 MMHG | DIASTOLIC BLOOD PRESSURE: 70 MMHG | RESPIRATION RATE: 16 BRPM | OXYGEN SATURATION: 100 %

## 2018-10-23 LAB
BACTERIA BLD CULT: SIGNIFICANT CHANGE UP
BACTERIA BLD CULT: SIGNIFICANT CHANGE UP

## 2018-10-23 PROCEDURE — 99239 HOSP IP/OBS DSCHRG MGMT >30: CPT

## 2018-10-23 RX ADMIN — SEVELAMER CARBONATE 800 MILLIGRAM(S): 2400 POWDER, FOR SUSPENSION ORAL at 09:37

## 2018-10-23 RX ADMIN — Medication 325 MILLIGRAM(S): at 12:17

## 2018-10-23 RX ADMIN — SEVELAMER CARBONATE 800 MILLIGRAM(S): 2400 POWDER, FOR SUSPENSION ORAL at 12:17

## 2018-10-23 RX ADMIN — Medication 3 MILLILITER(S): at 09:57

## 2018-10-23 RX ADMIN — HEPARIN SODIUM 5000 UNIT(S): 5000 INJECTION INTRAVENOUS; SUBCUTANEOUS at 06:09

## 2018-10-23 RX ADMIN — TIOTROPIUM BROMIDE 1 CAPSULE(S): 18 CAPSULE ORAL; RESPIRATORY (INHALATION) at 12:18

## 2018-10-23 RX ADMIN — Medication 1: at 12:17

## 2018-10-23 RX ADMIN — Medication 3 MILLILITER(S): at 03:44

## 2018-10-23 RX ADMIN — AMIODARONE HYDROCHLORIDE 200 MILLIGRAM(S): 400 TABLET ORAL at 06:09

## 2018-10-23 RX ADMIN — Medication 25 MILLIGRAM(S): at 06:08

## 2018-10-23 RX ADMIN — Medication 81 MILLIGRAM(S): at 12:18

## 2018-10-23 RX ADMIN — CLOPIDOGREL BISULFATE 75 MILLIGRAM(S): 75 TABLET, FILM COATED ORAL at 12:19

## 2018-10-23 NOTE — PROGRESS NOTE ADULT - PROBLEM SELECTOR PROBLEM 5
Benign prostatic hyperplasia, unspecified whether lower urinary tract symptoms present

## 2018-10-23 NOTE — PROGRESS NOTE ADULT - PROBLEM SELECTOR PROBLEM 9
Bipolar affective disorder, remission status unspecified

## 2018-10-23 NOTE — PROGRESS NOTE ADULT - PROBLEM SELECTOR PLAN 9
-c/w buspirone 5mg BID (home med)  -melatonin 3mg PRN qhs

## 2018-10-23 NOTE — PROGRESS NOTE ADULT - ASSESSMENT
72 y/o Mauritanian speaking M with T2DM, HTN, CAD s/p 12-14 stents placed in 1990's, and BABAR x 5 placed in Sept 2017, CHF with EF 20%, AF (not a/c 2/2 GIB), CVA , ESRD on HD (MWF) COPD, bipolar disorder, p/w admitted for hypoxic respiratory failure 2/2 volume overload though to be 2/2 volume overload w/ possible PNA and COPD exacerbation. Pt improved on BIPAP, urgent HD, duonebs. Stable for d/c today to [facility]. 74 y/o Lao speaking M with T2DM, HTN, CAD s/p 12-14 stents placed in 1990's, and BABAR x 5 placed in Sept 2017, CHF with EF 20%, AF (not a/c 2/2 GIB), CVA , ESRD on HD (MWF) COPD, bipolar disorder, p/w admitted for hypoxic respiratory failure 2/2 volume overload though to be 2/2 volume overload w/ possible PNA and COPD exacerbation. Pt improved on BIPAP, urgent HD, duonebs. Stable for d/c today to [facility].

## 2018-10-23 NOTE — PROGRESS NOTE ADULT - PROBLEM SELECTOR PLAN 5
-on tamsulosin 0.4mg qd, doxazosin 1mg at home  -c/w doxazosin 1mg po qhs

## 2018-10-23 NOTE — PROGRESS NOTE ADULT - PROBLEM SELECTOR PROBLEM 1
Other hypervolemia
Acute on chronic systolic heart failure
Other hypervolemia
Right hip pain
Right hip pain
Acute on chronic systolic heart failure

## 2018-10-23 NOTE — PROGRESS NOTE ADULT - PROBLEM SELECTOR PROBLEM 6
Chronic obstructive pulmonary disease, unspecified COPD type

## 2018-10-23 NOTE — PROGRESS NOTE ADULT - PROBLEM SELECTOR PROBLEM 3
Acute on chronic systolic heart failure
Acute on chronic systolic heart failure
Chronic atrial fibrillation
Chronic atrial fibrillation

## 2018-10-23 NOTE — PROGRESS NOTE ADULT - REASON FOR ADMISSION
hypoxic respiratory failure

## 2018-10-23 NOTE — PROGRESS NOTE ADULT - ATTENDING COMMENTS
73M DM2, HTN, CAD s/p 12-14 stents placed in 1990's, and BABAR x 5 placed in Sept 2017, CHF with EF 35%, AF (not a/c 2/2 bleeding risk), hx CVA , ESRD on HD (MWF) COPD, bipolar disorder, p/w admitted for hypoxic respiratory failure 2/2 volume overload w/ possible PNA and COPD exacerbation  --Respiratory failure with hypothermia, completed empiric abx for possible pneumonia  --clinically greatly improved with volume removal by HD  --stable for d/c to SNF  d/c time 35min coordinating care
73M DM2, HTN, CAD s/p 12-14 stents placed in 1990's, and BABAR x 5 placed in Sept 2017, CHF with EF 35%, AF (not a/c 2/2 bleeding risk), hx CVA , ESRD on HD (MWF) COPD, bipolar disorder, p/w admitted for hypoxic respiratory failure 2/2 volume overload w/ possible PNA and COPD exacerbation  --Respiratory failure with hypothermia as possible sign of infection, would continue course of abx for pneumonia, possibly due to aspiration  --clinically greatly improved with volume removal by HD  --f/u hip xrays  --anticipate stability to return to SNF
73M DM2, HTN, CAD s/p 12-14 stents placed in 1990's, and BABAR x 5 placed in Sept 2017, CHF with EF 35%, AF (not a/c 2/2 bleeding risk), hx CVA , ESRD on HD (MWF) COPD, bipolar disorder, p/w admitted for hypoxic respiratory failure 2/2 volume overload w/ possible PNA and COPD exacerbation  --Respiratory failure with hypothermia, completed empiric abx for possible pneumonia  --clinically greatly improved with volume removal by HD  --anticipate stable to return to SNF
73M DM2, HTN, CAD s/p 12-14 stents placed in 1990's, and BABAR x 5 placed in Sept 2017, CHF with EF 35%, AF (not a/c 2/2 bleeding risk), hx CVA , ESRD on HD (MWF) COPD, bipolar disorder, p/w admitted for hypoxic respiratory failure 2/2 volume overload w/ possible PNA and COPD exacerbation  --Respiratory failure with hypothermia as possible sign of infection, would continue course of abx for pneumonia, possibly due to aspiration  --clinically greatly improved with volume removal by HD  --f/u hip xrays--no longer c/o hip pan  --anticipate stability to return to SNF

## 2018-10-23 NOTE — PROGRESS NOTE ADULT - PROBLEM SELECTOR PLAN 4
on HD MWF; R AVF intact +thrill/bruit  -c/w ferrous sulfate 325mg qd  -EPO 2000U at HD  -c/w sevelamir 800 mg TID  would appreciate in-house renal recommendations for outpatient HD provider regarding increased fluid removal during HD

## 2018-10-23 NOTE — PROGRESS NOTE ADULT - PROBLEM SELECTOR PLAN 1
-possibly 2/2 recent asp PNA, now s/p 5d of azithromycin 500mg daily, augmentin 250 mg (renally dosed) and urgent HD 2/2 volume overload w/ significant improvement in clinical status  -TTE shows improved EF 35% in 9/18 compared to 9/17 (20%), global LV dysfunction w/ mild-mod mitral thickening +regurg  -will d/c abx now that pt has shown clinical improvement on 5-day course  -recurrent hospitalizations for volume may be 2/2 insufficient UF during HD; per renal, recommend removing at least 2kg per HD session. Pt needs to be at lower wt at next HD session than his dc weight

## 2018-10-23 NOTE — PROGRESS NOTE ADULT - PROBLEM SELECTOR PROBLEM 2
ESRD on hemodialysis
ESRD on hemodialysis
Right hip pain
SOB (shortness of breath)
SOB (shortness of breath)
Right hip pain

## 2018-10-23 NOTE — PROGRESS NOTE ADULT - PROBLEM SELECTOR PLAN 7
-c/w atorvastatin 40mg  -s/p 12-14stents in 90s + 5 BABAR's in 2017  -c/w asa 81mg, plavix 75mg po

## 2018-10-23 NOTE — PROGRESS NOTE ADULT - NSHPATTENDINGPLANDISCUSS_GEN_ALL_CORE
team
Dr Kulkarni, patient
Dr Kulkarni
Dr Kulkarni, patient
Dr Recinos, patient and family at bedside

## 2018-10-23 NOTE — PROGRESS NOTE ADULT - PROBLEM SELECTOR PLAN 2
-resolved, per pt  -no hematoma/bruising visible, no reports of fall noted; therefore, low suspicion for fracture or dislocation at this time. Hip Xray neg.  - pain resolved

## 2018-10-23 NOTE — PROGRESS NOTE ADULT - PROBLEM SELECTOR PLAN 6
-c/w efe q6h as needed - pt has not required since 10/19  -c/w spiriva
-c/w efe q6h as needed  -c/w spiriva
-c/w efe q6h as needed  -c/w spiriva
-c/w efe q6h as needed - pt has not required since 10/19  -c/w spiriva

## 2018-10-23 NOTE — PROGRESS NOTE ADULT - PROBLEM SELECTOR PLAN 8
FSGS at goal <180 inpt, not on meds  -last HBA1c 6.4% 6/18  -ISS TID, qhs

## 2018-10-23 NOTE — PROGRESS NOTE ADULT - SUBJECTIVE AND OBJECTIVE BOX
***************************************************************  Jayla Kulkarni PGY1  Internal Medicine   Pager: [phone]  [facility] in house pager: [phone]  ***************************************************************    JIMI BUCHANAN  73y  MRN: [id number]    Patient is a 73y old  Male who presents with a chief complaint of hypoxic respiratory failure (22 Oct 2018 12:48)      Subjective: no events ON. Denies fever, CP, SOB, abn pain, N/V, dysuria, leg pain. Tolerating diet.      MEDICATIONS  (STANDING):  ALBUTerol/ipratropium for Nebulization 3 milliLiter(s) Nebulizer every 6 hours  amiodarone    Tablet 200 milliGRAM(s) Oral daily  aspirin  chewable 81 milliGRAM(s) Oral daily  atorvastatin 40 milliGRAM(s) Oral at bedtime  busPIRone 5 milliGRAM(s) Oral <User Schedule>  clopidogrel Tablet 75 milliGRAM(s) Oral daily  doxazosin 1 milliGRAM(s) Oral at bedtime  ferrous    sulfate 325 milliGRAM(s) Oral daily  heparin  Injectable 5000 Unit(s) SubCutaneous every 8 hours  insulin lispro (HumaLOG) corrective regimen sliding scale   SubCutaneous three times a day before meals  insulin lispro (HumaLOG) corrective regimen sliding scale   SubCutaneous at bedtime  metoprolol tartrate 25 milliGRAM(s) Oral two times a day  midodrine 2.5 milliGRAM(s) Oral <User Schedule>  sevelamer carbonate Powder 800 milliGRAM(s) Oral three times a day with meals  tiotropium 18 MICROgram(s) Capsule 1 Capsule(s) Inhalation daily    MEDICATIONS  (PRN):  melatonin 3 milliGRAM(s) Oral at bedtime PRN Insomnia      Objective:    Vitals: Vital Signs Last 24 Hrs  T(C): 36.5 (10-23-18 @ 05:42), Max: 37 (10-22-18 @ 12:19)  T(F): 97.7 (10-23-18 @ 05:42), Max: 98.6 (10-22-18 @ 12:19)  HR: 73 (10-23-18 @ 05:42) (66 - 82)  BP: 125/63 (10-23-18 @ 05:42) (111/58 - 145/69)  BP(mean): --  RR: 16 (10-23-18 @ 05:42) (16 - 18)  SpO2: 96% (10-23-18 @ 05:42) (96% - 100%)              I&O's Summary    22 Oct 2018 07:01  -  23 Oct 2018 07:00  --------------------------------------------------------  IN: 936 mL / OUT: 3400 mL / NET: -2464 mL      PHYSICAL EXAM:  GENERAL: NAD  HEAD:  Atraumatic, Normocephalic  EYES: EOMI, conjunctiva and sclera clear  CHEST/LUNG: Clear to percussion bilaterally; No rales, rhonchi, wheezing, or rubs  HEART: Regular rate and rhythm; 3/6  HS murmur rad to axilla, rubs, or gallops  ABDOMEN: Soft, Nontender, Nondistended;   SKIN: No rashes or lesions  LE: muscle wasting, no pain on passive motion  NERVOUS SYSTEM:  Alert & Oriented X3, no focal deficit    LABS:                        10.1   6.98  )-----------( 169      ( 22 Oct 2018 12:44 )             34.4                         8.1    6.77  )-----------( 279      ( 22 Oct 2018 06:30 )             27.9                         9.1    8.85  )-----------( 303      ( 21 Oct 2018 07:45 )             31.6     10-22    141  |  101  |  49<H>  ----------------------------<  138<H>  3.7   |  26  |  5.28<H>  10-21    145  |  102  |  41<H>  ----------------------------<  135<H>  3.7   |  27  |  4.51<H>    Ca    8.9      22 Oct 2018 06:30  Ca    9.1      21 Oct 2018 07:45  Phos  3.8     10-22  Mg     2.0     10-22                        CAPILLARY BLOOD GLUCOSE      POCT Blood Glucose.: 108 mg/dL (23 Oct 2018 08:19)  POCT Blood Glucose.: 141 mg/dL (22 Oct 2018 22:27)  POCT Blood Glucose.: 110 mg/dL (22 Oct 2018 17:17)  POCT Blood Glucose.: 145 mg/dL (22 Oct 2018 11:32)    RADIOLOGY & ADDITIONAL TESTS:  Imaging Personally Reviewed:  [x ] YES  [ ] NO  Consultants involved in case:   Consultant(s) Notes Reviewed:  [ x] YES  [ ] NO:   Care Discussed with Consultants/Other Providers [x ] YES  [ ] NO ***************************************************************  Jayla Kulkarni PGY1  Internal Medicine   Pager: [phone]  [facility] in house pager: [phone]  ***************************************************************    JIMI BUCHANAN  73y  MRN: [id number]    Patient is a 73y old  Male who presents with a chief complaint of hypoxic respiratory failure (22 Oct 2018 12:48)      Subjective: no events ON. received hip xray yesterday night. spoke to his daughter, who is in accordance with plan of returning to . Denies fever, CP, SOB, abn pain, N/V, dysuria, leg pain. Tolerating diet.      MEDICATIONS  (STANDING):  ALBUTerol/ipratropium for Nebulization 3 milliLiter(s) Nebulizer every 6 hours  amiodarone    Tablet 200 milliGRAM(s) Oral daily  aspirin  chewable 81 milliGRAM(s) Oral daily  atorvastatin 40 milliGRAM(s) Oral at bedtime  busPIRone 5 milliGRAM(s) Oral <User Schedule>  clopidogrel Tablet 75 milliGRAM(s) Oral daily  doxazosin 1 milliGRAM(s) Oral at bedtime  ferrous    sulfate 325 milliGRAM(s) Oral daily  heparin  Injectable 5000 Unit(s) SubCutaneous every 8 hours  insulin lispro (HumaLOG) corrective regimen sliding scale   SubCutaneous three times a day before meals  insulin lispro (HumaLOG) corrective regimen sliding scale   SubCutaneous at bedtime  metoprolol tartrate 25 milliGRAM(s) Oral two times a day  midodrine 2.5 milliGRAM(s) Oral <User Schedule>  sevelamer carbonate Powder 800 milliGRAM(s) Oral three times a day with meals  tiotropium 18 MICROgram(s) Capsule 1 Capsule(s) Inhalation daily    MEDICATIONS  (PRN):  melatonin 3 milliGRAM(s) Oral at bedtime PRN Insomnia      Objective:    Vitals: Vital Signs Last 24 Hrs  T(C): 36.5 (10-23-18 @ 05:42), Max: 37 (10-22-18 @ 12:19)  T(F): 97.7 (10-23-18 @ 05:42), Max: 98.6 (10-22-18 @ 12:19)  HR: 73 (10-23-18 @ 05:42) (66 - 82)  BP: 125/63 (10-23-18 @ 05:42) (111/58 - 145/69)  BP(mean): --  RR: 16 (10-23-18 @ 05:42) (16 - 18)  SpO2: 96% (10-23-18 @ 05:42) (96% - 100%)              I&O's Summary    22 Oct 2018 07:01  -  23 Oct 2018 07:00  --------------------------------------------------------  IN: 936 mL / OUT: 3400 mL / NET: -2464 mL      PHYSICAL EXAM:  GENERAL: NAD  HEAD:  Atraumatic, Normocephalic  EYES: EOMI, conjunctiva and sclera clear  CHEST/LUNG: Clear to percussion bilaterally; No rales, rhonchi, wheezing, or rubs  HEART: Regular rate and rhythm; 3/6  HS murmur rad to axilla, rubs, or gallops  ABDOMEN: Soft, Nontender, Nondistended;   SKIN: No rashes or lesions  LE: muscle wasting, no pain on passive motion  NERVOUS SYSTEM:  Alert & Oriented X3, no focal deficit    LABS:                        10.1   6.98  )-----------( 169      ( 22 Oct 2018 12:44 )             34.4                         8.1    6.77  )-----------( 279      ( 22 Oct 2018 06:30 )             27.9                         9.1    8.85  )-----------( 303      ( 21 Oct 2018 07:45 )             31.6     10-22    141  |  101  |  49<H>  ----------------------------<  138<H>  3.7   |  26  |  5.28<H>  10-21    145  |  102  |  41<H>  ----------------------------<  135<H>  3.7   |  27  |  4.51<H>    Ca    8.9      22 Oct 2018 06:30  Ca    9.1      21 Oct 2018 07:45  Phos  3.8     10-22  Mg     2.0     10-22                        CAPILLARY BLOOD GLUCOSE      POCT Blood Glucose.: 108 mg/dL (23 Oct 2018 08:19)  POCT Blood Glucose.: 141 mg/dL (22 Oct 2018 22:27)  POCT Blood Glucose.: 110 mg/dL (22 Oct 2018 17:17)  POCT Blood Glucose.: 145 mg/dL (22 Oct 2018 11:32)    RADIOLOGY & ADDITIONAL TESTS:  Imaging Personally Reviewed:  [x ] YES  [ ] NO  Consultants involved in case:   Consultant(s) Notes Reviewed:  [ x] YES  [ ] NO:   Care Discussed with Consultants/Other Providers [x ] YES  [ ] NO

## 2018-10-24 DIAGNOSIS — I25.10 ATHEROSCLEROTIC HEART DISEASE OF NATIVE CORONARY ARTERY W/OUT ANGINA PECTORIS: ICD-10-CM

## 2018-10-24 DIAGNOSIS — I10 ESSENTIAL (PRIMARY) HYPERTENSION: ICD-10-CM

## 2018-10-24 DIAGNOSIS — J44.9 CHRONIC OBSTRUCTIVE PULMONARY DISEASE, UNSPECIFIED: ICD-10-CM

## 2018-10-24 DIAGNOSIS — E78.5 HYPERLIPIDEMIA, UNSPECIFIED: ICD-10-CM

## 2018-10-24 DIAGNOSIS — G47.00 INSOMNIA, UNSPECIFIED: ICD-10-CM

## 2018-10-24 DIAGNOSIS — I48.91 UNSPECIFIED ATRIAL FIBRILLATION: ICD-10-CM

## 2018-10-24 DIAGNOSIS — F32.9 MAJOR DEPRESSIVE DISORDER, SINGLE EPISODE, UNSPECIFIED: ICD-10-CM

## 2018-10-24 DIAGNOSIS — N40.0 BENIGN PROSTATIC HYPERPLASIA WITHOUT LOWER URINARY TRACT SYMPMS: ICD-10-CM

## 2018-10-24 DIAGNOSIS — D64.9 ANEMIA, UNSPECIFIED: ICD-10-CM

## 2018-10-24 RX ORDER — DOXAZOSIN 1 MG/1
1 TABLET ORAL DAILY
Refills: 0 | Status: ACTIVE | COMMUNITY
Start: 2018-10-24

## 2018-10-24 RX ORDER — MULTIVITAMIN
TABLET ORAL DAILY
Refills: 0 | Status: ACTIVE | COMMUNITY
Start: 2018-10-24

## 2018-10-24 RX ORDER — ASPIRIN ENTERIC COATED TABLETS 81 MG 81 MG/1
81 TABLET, DELAYED RELEASE ORAL DAILY
Refills: 0 | Status: ACTIVE | COMMUNITY
Start: 2018-10-24

## 2018-10-24 RX ORDER — SEVELAMER CARBONATE 800 MG/1
0.8 POWDER, FOR SUSPENSION ORAL 3 TIMES DAILY
Refills: 0 | Status: ACTIVE | COMMUNITY
Start: 2018-10-24

## 2018-10-24 RX ORDER — MIDODRINE HYDROCHLORIDE 2.5 MG/1
2.5 TABLET ORAL
Refills: 0 | Status: ACTIVE | COMMUNITY
Start: 2018-10-24

## 2018-10-24 RX ORDER — BUSPIRONE HYDROCHLORIDE 5 MG/1
5 TABLET ORAL
Refills: 0 | Status: ACTIVE | COMMUNITY
Start: 2018-10-24

## 2018-10-24 RX ORDER — TAMSULOSIN HYDROCHLORIDE 0.4 MG/1
0.4 CAPSULE ORAL AT BEDTIME
Refills: 0 | Status: ACTIVE | COMMUNITY
Start: 2018-10-24

## 2018-10-24 RX ORDER — ACETAMINOPHEN 325 MG/1
325 TABLET ORAL EVERY 6 HOURS
Refills: 0 | Status: ACTIVE | COMMUNITY
Start: 2018-10-24

## 2018-10-24 RX ORDER — CLOPIDOGREL BISULFATE 75 MG/1
75 TABLET, FILM COATED ORAL DAILY
Refills: 0 | Status: ACTIVE | COMMUNITY
Start: 2018-10-24

## 2018-10-24 RX ORDER — AMIODARONE HYDROCHLORIDE 200 MG/1
200 TABLET ORAL DAILY
Refills: 0 | Status: ACTIVE | COMMUNITY
Start: 2018-10-24

## 2018-10-24 RX ORDER — ATORVASTATIN CALCIUM 40 MG/1
40 TABLET, FILM COATED ORAL
Refills: 0 | Status: ACTIVE | COMMUNITY
Start: 2018-10-24

## 2018-10-24 RX ORDER — TIOTROPIUM BROMIDE 18 UG/1
18 CAPSULE ORAL; RESPIRATORY (INHALATION) DAILY
Refills: 0 | Status: ACTIVE | COMMUNITY
Start: 2018-10-24

## 2018-10-24 RX ORDER — METOPROLOL TARTRATE 25 MG/1
25 TABLET, FILM COATED ORAL TWICE DAILY
Refills: 0 | Status: ACTIVE | COMMUNITY
Start: 2018-10-24

## 2018-10-24 RX ORDER — IPRATROPIUM BROMIDE AND ALBUTEROL SULFATE 2.5; .5 MG/3ML; MG/3ML
0.5-2.5 (3) SOLUTION RESPIRATORY (INHALATION)
Refills: 0 | Status: ACTIVE | COMMUNITY
Start: 2018-10-24

## 2018-10-24 RX ORDER — CHLORHEXIDINE GLUCONATE 4 %
325 (65 FE) LIQUID (ML) TOPICAL 3 TIMES DAILY
Refills: 0 | Status: ACTIVE | COMMUNITY
Start: 2018-10-24

## 2018-10-24 RX ORDER — ALBUTEROL SULFATE 90 UG/1
108 (90 BASE) AEROSOL, METERED RESPIRATORY (INHALATION) EVERY 6 HOURS
Refills: 0 | Status: ACTIVE | COMMUNITY
Start: 2018-10-24

## 2018-10-24 RX ORDER — MELATONIN 3 MG
3 CAPSULE ORAL
Refills: 0 | Status: ACTIVE | COMMUNITY
Start: 2018-10-24

## 2019-02-13 NOTE — PATIENT PROFILE ADULT. - HAS THE PATIENT HAD A SIGNIFICANT CHANGE IN FUNCTIONAL STATUS DUE TO CVA, HEAD TRAUMA, ORTHOPEDIC TRAUMA/SURGERY, OR FALL, WITH THE WEEK PRIOR TO ADMISSION
Patient Instructions by Jeanette Quiros CMA at 10/04/18 05:06 PM     Author:  Jeanette Quiros CMA Service:  (none) Author Type:  Certified Medical Assistant     Filed:  10/04/18 05:06 PM Encounter Date:  10/4/2018 Status:  Signed     :  Jeanette Quiros CMA (Certified Medical Assistant)            TREATMENT ORDERS      Follow-Up  4 weeks    Time left: 10/4/2018 5:06 PM     Next appointment:        Location:        Provider:         Please note: 24 hour notice for cancellation of appointment is required.    You may receive a survey in the mail, or via the e-mail address that you have provided.  We would appreciate if you could fill out the survey and provide us with any feedback on your experience regarding your visit today. Thank you for allowing us to provide you with your health care needs.     Do not hesitate to call if you are experiencing severe pain, worsening or change in your pain, have symptoms of infection (fever, warmth, redness, increased drainage), or have any other problem that concerns you ~ [phone] (or [phone] after hours).    Please remember when requesting refills on pain medication that the request should be made by Thursday at the latest. [facility] is open Monday-Friday, 8am-5pm, and closed on the weekends.  No narcotic refills will be filled after hours.    Additional Educational Resources:  For additional resources regarding your symptoms, diagnosis, or further health information, please visit the Health Resources section on [email or web address] or the Online Health Resources section in [facility].          Revision History        User Key Date/Time User Provider Type Action    > [N/A] 10/04/18 05:06 PM Jeanette Quiros CMA Certified Medical Assistant Sign             no

## 2019-02-21 ENCOUNTER — EMERGENCY (EMERGENCY)
Facility: HOSPITAL | Age: 74
LOS: 1 days | Discharge: HOME | End: 2019-02-21
Attending: EMERGENCY MEDICINE | Admitting: PERSONAL EMERGENCY RESPONSE ATTENDANT
Payer: MEDICARE

## 2019-02-21 VITALS
OXYGEN SATURATION: 100 % | TEMPERATURE: 98 F | SYSTOLIC BLOOD PRESSURE: 142 MMHG | DIASTOLIC BLOOD PRESSURE: 89 MMHG | HEART RATE: 71 BPM | RESPIRATION RATE: 16 BRPM

## 2019-02-21 VITALS
HEART RATE: 72 BPM | SYSTOLIC BLOOD PRESSURE: 126 MMHG | OXYGEN SATURATION: 100 % | TEMPERATURE: 99 F | RESPIRATION RATE: 16 BRPM | DIASTOLIC BLOOD PRESSURE: 88 MMHG

## 2019-02-21 DIAGNOSIS — Z98.890 OTHER SPECIFIED POSTPROCEDURAL STATES: Chronic | ICD-10-CM

## 2019-02-21 LAB
ALBUMIN SERPL ELPH-MCNC: 4 G/DL — SIGNIFICANT CHANGE UP (ref 3.3–5)
ALP SERPL-CCNC: 111 U/L — SIGNIFICANT CHANGE UP (ref 40–120)
ALT FLD-CCNC: 13 U/L — SIGNIFICANT CHANGE UP (ref 4–41)
ANION GAP SERPL CALC-SCNC: 18 MMO/L — HIGH (ref 7–14)
AST SERPL-CCNC: 12 U/L — SIGNIFICANT CHANGE UP (ref 4–40)
BILIRUB SERPL-MCNC: 0.3 MG/DL — SIGNIFICANT CHANGE UP (ref 0.2–1.2)
BLD GP AB SCN SERPL QL: NEGATIVE — SIGNIFICANT CHANGE UP
BUN SERPL-MCNC: 26 MG/DL — HIGH (ref 7–23)
CALCIUM SERPL-MCNC: 9.7 MG/DL — SIGNIFICANT CHANGE UP (ref 8.4–10.5)
CHLORIDE SERPL-SCNC: 95 MMOL/L — LOW (ref 98–107)
CO2 SERPL-SCNC: 27 MMOL/L — SIGNIFICANT CHANGE UP (ref 22–31)
CREAT SERPL-MCNC: 3.94 MG/DL — HIGH (ref 0.5–1.3)
GLUCOSE SERPL-MCNC: 135 MG/DL — HIGH (ref 70–99)
HCT VFR BLD CALC: 34.8 % — LOW (ref 39–50)
HGB BLD-MCNC: 9.9 G/DL — LOW (ref 13–17)
MCHC RBC-ENTMCNC: 27 PG — SIGNIFICANT CHANGE UP (ref 27–34)
MCHC RBC-ENTMCNC: 28.4 % — LOW (ref 32–36)
MCV RBC AUTO: 94.8 FL — SIGNIFICANT CHANGE UP (ref 80–100)
NRBC # FLD: 0 K/UL — LOW (ref 25–125)
PLATELET # BLD AUTO: 264 K/UL — SIGNIFICANT CHANGE UP (ref 150–400)
PMV BLD: 10.2 FL — SIGNIFICANT CHANGE UP (ref 7–13)
POTASSIUM SERPL-MCNC: 3.4 MMOL/L — LOW (ref 3.5–5.3)
POTASSIUM SERPL-SCNC: 3.4 MMOL/L — LOW (ref 3.5–5.3)
PROT SERPL-MCNC: 7.4 G/DL — SIGNIFICANT CHANGE UP (ref 6–8.3)
RBC # BLD: 3.67 M/UL — LOW (ref 4.2–5.8)
RBC # FLD: 14.8 % — HIGH (ref 10.3–14.5)
RH IG SCN BLD-IMP: NEGATIVE — SIGNIFICANT CHANGE UP
SODIUM SERPL-SCNC: 140 MMOL/L — SIGNIFICANT CHANGE UP (ref 135–145)
WBC # BLD: 8.33 K/UL — SIGNIFICANT CHANGE UP (ref 3.8–10.5)
WBC # FLD AUTO: 8.33 K/UL — SIGNIFICANT CHANGE UP (ref 3.8–10.5)

## 2019-02-21 PROCEDURE — 99283 EMERGENCY DEPT VISIT LOW MDM: CPT | Mod: GC,25

## 2019-02-21 PROCEDURE — 99283 EMERGENCY DEPT VISIT LOW MDM: CPT

## 2019-02-21 NOTE — ED ADULT NURSE NOTE - OBJECTIVE STATEMENT
Pt A&Ox1. Pt coming in from [place] for bleeding at av fistula site. Pt with AV fistula to right arm with bandage around fistula. Dressing dry and intact. MD Regan at bedside assessing fistula area. Fistula not actively bleeding at this time. Site clean/dry, no oozing or redness noted. Positive thrill felt. Pt arrived on 2L nasal cannula sating 98%. Respirations equal, nonlabored, no sign of respiratory distress. Normal sinus on monitor. Stage 1 nonblanchable pressure ulcer noted to sacral area. Dressing placed to area. Denies any complaints at this time. Pt appears comfortable and in no distress. Per nursing home paperwork DNR/DNI/No feeding tube in chart. Pt Citizen of [place] speaking and refusing to answer many questions. Awaiting vascular to assess patient. Will continue to monitor Pt A&Ox1 to name. Pt coming in from [place] for bleeding at av fistula site. Pt with AV fistula to right arm with bandage around fistula. Dressing dry and intact. MD Regan at bedside assessing fistula area. Fistula not actively bleeding at this time. Site clean/dry, no oozing or redness noted. Positive thrill felt. Pt arrived on 2L nasal cannula sating 98%. Respirations equal, nonlabored, no sign of respiratory distress. Normal sinus on monitor. Stage 1 nonblanchable pressure ulcer noted to sacral area. Dressing placed to area. Denies any complaints at this time. Pt appears comfortable and in no distress. Per nursing home paperwork DNR/DNI/No feeding tube in chart. Pt [place]ian speaking and refusing to answer many questions. Awaiting vascular to assess patient. Will continue to monitor Pt A&Ox1 to name. Pt coming in from [place] for bleeding at av fistula site. Pt with AV fistula to right arm with bandage around fistula. Dressing dry and intact. MD Regan at bedside assessing fistula area. Fistula not actively bleeding at this time. Site clean/dry, no oozing or redness noted. Positive thrill felt. Dialysis M/W/F. Last dialysis yesterday 02/20. Pt noted to be contracted. Pt arrived on 2L nasal cannula sating 98%. Respirations equal, nonlabored, no sign of respiratory distress. Normal sinus on monitor. Stage 1 nonblanchable pressure ulcer noted to sacral area. Dressing placed to area. Denies any complaints at this time. Pt appears comfortable and in no distress. Per nursing home paperwork DNR/DNI/No feeding tube in chart. Pt [place] speaking and refusing to answer many questions. Awaiting vascular to assess patient. Will continue to monitor

## 2019-02-21 NOTE — ED PROVIDER NOTE - NSFOLLOWUPINSTRUCTIONS_ED_ALL_ED_FT
- Follow up with Dr Tinajero (information above) within 1-2 weeks. Please call for an appointment. \  - Bring results with you to the appointment.   - Return to the ED for any new or worsening symptoms.

## 2019-02-21 NOTE — ED PROVIDER NOTE - TOBACCO USE
Last office visit: 8/27/18  Follow-up: 2/27/19    Received refill request for patient's montelukast tablets. Will send in 30-day supply to get patient to follow-up appointment.     
Unknown if ever smoked

## 2019-02-21 NOTE — CONSULT NOTE ADULT - SUBJECTIVE AND OBJECTIVE BOX
72yo M with PMHx CVA, COPD, BPH, CKD, pancreatitis, HTN, ESRD on HD via RUE AVG, now sent in from dialysis with bleeding fistula. The patient last underwent dialysis yesterday (2/20). Per signout, the bleeding continued and required a pressure dressing; the pt was then sent to the ED.    On arrival, per the ED staff, the patient had a mostly dry pressure dressing on his arm; upon removal, the fistula was no longer bleeding.    PMH  CVA (cerebral vascular accident)  COPD (chronic obstructive pulmonary disease)  BPH (benign prostatic hyperplasia)  Bipolar affective disorder  CKD (chronic kidney disease)  Pancreatitis  Hypertension  Dyslipidemia  Diabetes mellitus  Coronary artery disease  HLD (hyperlipidemia)  Anemia  Acute renal failure  CKD (chronic kidney disease)  COPD (chronic obstructive pulmonary disease)  Fainting  Cardiac arrhythmia  Dizziness  Hydronephrosis  Cholecystitis  Bipolar 1 disorder  DM (diabetes mellitus)  HTN (hypertension)  Lumbar radiculopathy  Left heart failure  Reflux esophagitis  Coronary atherosclerosis    PSH  History of cardiac catheterization    Allergies  No Known Allergies    Physical Exam  T(C): 36.4 (02-21-19 @ 08:27), Max: 37 (02-21-19 @ 04:16)  HR: 71 (02-21-19 @ 08:27) (71 - 73)  BP: 142/89 (02-21-19 @ 08:27) (126/88 - 144/66)  RR: 16 (02-21-19 @ 08:27) (16 - 20)  SpO2: 100% (02-21-19 @ 08:27) (100% - 100%)  Wt(kg): --  Tmax: T(C): , Max: 37 (02-21-19 @ 04:16)  Wt(kg): --    Gen: NAD  HEENT: normocephalic, atraumatic  RUE graft: +thrill, palpable distal pulses, warm, no edema, superficial clot overlying fistula, no ulcers, no aneurysms.  Ext: warm, no edema    Labs:                        9.9    8.33  )-----------( 264      ( 21 Feb 2019 06:37 )             34.8     02-21    140  |  95<L>  |  26<H>  ----------------------------<  135<H>  3.4<L>   |  27  |  3.94<H>    Ca    9.7      21 Feb 2019 07:08    TPro  7.4  /  Alb  4.0  /  TBili  0.3  /  DBili  x   /  AST  12  /  ALT  13  /  AlkPhos  111  02-21

## 2019-02-21 NOTE — ED PROVIDER NOTE - CARE PROVIDER_API CALL
Rob Tinajero)  Surgery  Vascular  [address] [facility], [address] [place] [address]  Phone: [phone]  Fax: [phone]  Follow Up Time:

## 2019-02-21 NOTE — ED ADULT TRIAGE NOTE - CHIEF COMPLAINT QUOTE
pt arrives from [place] for bleeding at av fistula site. pt with av fistula to right arm with bandage around unable to visualize. Bleeding appears to be under control.

## 2019-02-21 NOTE — ED PROVIDER NOTE - OBJECTIVE STATEMENT
73 y.o. male ESRD on HD M,W,F presents from [place] with bleeding AV fistula.  Patient denies lightheadedness, chest pain, dizziness.  No bleeding at time of eval.

## 2019-02-21 NOTE — ED PROVIDER NOTE - PHYSICAL EXAMINATION
Gen:  NAD, alert  HEENT:  sclera clear, MMM  Heart:  RRR, no M/R/G  Lung:  CTA b/l, no rales or wheeze  ABd:  ND, soft, NT  Ext:  Small dried blood on AV fistula on right upper arm - no sign of active bleeding nor blood on bandages.  Thrill palpable.  Skin:  no rash or ecchymosis  Neuro:  Nonfocal

## 2019-02-21 NOTE — ED ADULT NURSE REASSESSMENT NOTE - NS ED NURSE REASSESS COMMENT FT1
pt A&O1 pt able to nod to answer questions, pt appears comfortable on 2L nC sating 100% respirations equal and non labored, will monitor,

## 2019-02-21 NOTE — ED PROVIDER NOTE - PROGRESS NOTE DETAILS
Attending MD Casillas.  Pt signed out to me in stable condition pending vasc fellow to see, not bleeding in ED, plan to monitor and vasc fellow to see Kenyetta MAYORGA: Spoke to vascular attending (Dr Rob Tinajero). Agrees that since pt is not bleeding and there is no evidence of ulceration he can be discharged. Patient to follow with Dr Tinajero within 1-2 weeks. Pt with no bleeding in the ED. SW to arrange transport. Attending MD Casillas.  Pt continues to have no bleeding.  Vascular attending called and recommending outpt follow-up, return to ED for development of bleeding/pain.

## 2019-02-21 NOTE — ED ADULT NURSE REASSESSMENT NOTE - NS ED NURSE REASSESS COMMENT FT1
pt turned and positioned cleaned pt appears more alert known name and birthday, denies pain at this time no respiratory distress noted at this time

## 2019-02-21 NOTE — ED ADULT NURSE NOTE - CHIEF COMPLAINT QUOTE
pt arrives from [place] for bleeding at av fistula site. pt with av fistula to right arm with bandage around unable to visualize. Bleeding appears to be under control.

## 2019-02-21 NOTE — CONSULT NOTE ADULT - ASSESSMENT
72yo M with ESRD on HD via RUE AVG, now with bleeding from fistula post-dialysis, controlled with pressure  - No acute vascular surgery interventions at this time  - Recommend f/u for fistulogram to assess for central stenosis  - Dispo per ED  - D/w vascular fellow    C team  [id number]

## 2019-04-22 NOTE — CHART NOTE - NSCHARTNOTEFT_GEN_A_CORE
Source: Patient [ ]    Family [ ]     other [ x]Nurse, chart    Diet : CSTCHO/DYS1HC/RENAL  3 X HC NEPRO  As per Internal Medicine note  "72/M with T2DM, HTN, HLD, Embolic CVA with right hemiplegia, COPD, bipolar, bilateral nephrolithiasis and bladder stones, ASHD, discharged 10/18 after long hospital stay when initially presented with NSTEMI/pulm edema, UTI, VT s/p shock, embolic CVA, new AF, re-admitted after hypotensive episode in dialysis found with consistent bacteremia with COANS and was started on Vancomycin post HD, s/p permacath exchange"  as per Infectious Disease note:  "Given negative cultures and clinical stability no ID contraindications to planned AVF ,however risks inherent to any invasive procedure/surgery including infection will remain.Risks/benefits of the same should be discussed with the patient and an informed consent should be obtained by the performing physician."        Patient reports [ ] nausea  [ ] vomiting [ ] diarrhea [ ] constipation  [ ]chewing problems [ ] swallowing issues  [X ] other: PT NON-VERBAL, Nurse reports good intake, >75% of meals and Nepro. pt dislikes applesauce so she gives him meds with oatmeal.     PO intake:  < 50% [ ] 50-75% [ ]   % [X ]  other :     Source for PO intake [ ] Patient [ ] family [ ] chart [X ] staff [ ] other     Enteral /Parenteral Nutrition:       Current Weight: 173.2POUNDS/78.6KG  % Weight Change:2% LOSS SINCE PREVIOUS ASSESS ON 11/5    Pertinent Medications: MEDICATIONS  (STANDING):  amiodarone    Tablet 200 milliGRAM(s) Oral daily  artificial  tears Solution 1 Drop(s) Both EYES four times a day  aspirin  chewable 81 milliGRAM(s) Oral daily  atorvastatin 40 milliGRAM(s) Oral at bedtime  BACItracin   Ointment 1 Application(s) Topical two times a day  busPIRone 5 milliGRAM(s) Oral two times a day  clopidogrel Tablet 75 milliGRAM(s) Oral daily  clotrimazole/betamethasone Cream 1 Application(s) Topical two times a day  dextrose 5%. 1000 milliLiter(s) (50 mL/Hr) IV Continuous <Continuous>  dextrose 50% Injectable 12.5 Gram(s) IV Push once  dextrose 50% Injectable 25 Gram(s) IV Push once  dextrose 50% Injectable 25 Gram(s) IV Push once  fluticasone propionate   110 MICROgram(s) HFA Inhaler 1 Puff(s) Inhalation daily  heparin  Infusion.  Unit(s)/Hr (14 mL/Hr) IV Continuous <Continuous>  insulin lispro (HumaLOG) corrective regimen sliding scale   SubCutaneous Before meals and at bedtime  Nephro-sophie 1 Tablet(s) Oral daily  pantoprazole   Suspension 40 milliGRAM(s) Oral before breakfast  tamsulosin 0.4 milliGRAM(s) Oral at bedtime    MEDICATIONS  (PRN):  ALBUTerol    90 MICROgram(s) HFA Inhaler 2 Puff(s) Inhalation every 6 hours PRN Shortness of Breath and/or Wheezing  dextrose Gel 1 Dose(s) Oral once PRN Blood Glucose LESS THAN 70 milliGRAM(s)/deciliter  glucagon  Injectable 1 milliGRAM(s) IntraMuscular once PRN Glucose LESS THAN 70 milligrams/deciliter  heparin  Injectable 6500 Unit(s) IV Push every 6 hours PRN For aPTT less than 40  heparin  Injectable 3000 Unit(s) IV Push every 6 hours PRN For aPTT between 40 - 57    Pertinent Labs:  11-13 Na138 mmol/L Glu 139 mg/dL<H> K+ 3.9 mmol/L Cr  5.70 mg/dL<H> BUN 60 mg/dL<H>       CAPILLARY BLOOD GLUCOSE  134 (13 Nov 2017 08:55)      POCT Blood Glucose.: 134 mg/dL (13 Nov 2017 08:57)  POCT Blood Glucose.: 125 mg/dL (12 Nov 2017 21:23)  POCT Blood Glucose.: 207 mg/dL (12 Nov 2017 18:18)  POCT Blood Glucose.: 169 mg/dL (12 Nov 2017 12:39)    Hemoglobin A1C, Whole Blood: 6.5 % (09-13 @ 10:32)  Hemoglobin A1C, Whole Blood: 6.5 % (09-07 @ 09:57)          Skin: STAGE 3 COCCYX, no edema    Estimated Needs:   [ X] no change since previous assessment  [ ] recalculated:       Previous Nutrition Diagnosis:     [ ] Inadequate Energy Intake [ ]Inadequate Oral Intake [ ] Excessive Energy Intake     [ ] Underweight [ X] Increased Nutrient Needs [ ] Overweight/Obesity     [ ] Altered GI Function [ ] Unintended Weight Loss [ ] Food & Nutrition Related Knowledge Deficit [ ] Malnutrition          Nutrition Diagnosis is [X ] ongoing  [ ] resolved [ ] not applicable          New Nutrition Diagnosis: [ X] not applicable    [ ] Inadequate Protein Energy Intake [ ]Inadequate Oral Intake [ ] Excessive Energy Intake     [ ] Underweight [ ] Increased Nutrient Needs [ ] Overweight/Obesity     [ ] Altered GI Function [ ] Unintended Weight Loss [ ] Food & Nutrition Related Knowledge Deficit[ ] Limited Adherence to nutrition related recommendations [ ] Malnutrition  [ ] other: Free text       Related to:      As evidenced by:      Interventions:     Recommend    [ ] Change Diet To:    [X ] Nutrition Supplement: VITC    [ ] Nutrition Support    [ ] Other:        Monitoring and Evaluation:     [x ] PO intake [ x] Tolerance to diet prescription [x ] weights [x ] follow up per protocol    [ ] other: I have reviewed and confirmed nurses' notes for patient's medications, allergies, medical history, and surgical history.

## 2019-05-08 NOTE — CONSULT NOTE ADULT - PROBLEM SELECTOR PROBLEM 1
Labs as discussed.  Continue same medicines.  Stay active.  Monitor blood pressure at least once a week.  Monitor blood sugars at least twice weekly.  Recommend diabetic eye exam.  Patient will schedule on her own.  Pt verbalizes understanding and agreement with plan of care.    Other hypervolemia

## 2019-05-09 NOTE — PATIENT PROFILE ADULT. - NS TRANSFER PATIENT BELONGINGS
----- Message from JAMES Marte CNP sent at 5/8/2019  5:20 PM EDT -----  D-dimer neg ; waiting on sed rate    Labs are likely not fasting so leaving trig elevated ; plan to recheck in six months Clothing

## 2019-07-11 NOTE — PATIENT PROFILE ADULT. - SPIRITUAL CULTURAL, RELIGIOUS PRACTICES/VALUES, PROFILE
Is This A New Presentation, Or A Follow-Up?: Skin Lesions What Type Of Note Output Would You Prefer (Optional)?: Bullet Format How Severe Is Your Skin Lesion?: moderate Has Your Skin Lesion Been Treated?: not been treated Additional History: PCP tried a few topicals but didn’t helped None

## 2019-08-09 NOTE — CONSULT NOTE ADULT - CONSULT REQUESTED DATE/TIME
Laxmi Ruvalcaba is a 50year old male. HPI:     HPI     Diabetic Eye Exam     Diabetes characteristics include Type 2, controlled with diet and on insulin. Duration of 19 years. Number of years diabetic 23. Number of years on pills 0.   Number of ye Strip TEST 6 TIMES PER DAY Disp: 200 strip Rfl: 11   FLUOCINONIDE 0.05 % External Cream APPLY TO AFFECTED AREA(S) TWO TIMES A DAY Disp: 30 g Rfl: 0   aspirin (ASPIR-81) 81 MG Oral Tab EC Take 81 mg by mouth daily.  Disp:  Rfl:        Allergies:  No Known Al 1 diabetes mellitus without complication ([facility].)  I advised patient that there is no background diabetic retinopathy in either eye and that they should continue to keep their blood sugar under control and continue to see their physician as directed.  I stress 23-Oct-2017 09:23

## 2020-01-01 ENCOUNTER — OUTPATIENT (OUTPATIENT)
Dept: OUTPATIENT SERVICES | Facility: HOSPITAL | Age: 75
LOS: 1 days | End: 2020-01-01

## 2020-01-01 ENCOUNTER — INPATIENT (INPATIENT)
Facility: HOSPITAL | Age: 75
LOS: 0 days | End: 2020-11-04
Attending: INTERNAL MEDICINE | Admitting: INTERNAL MEDICINE
Payer: MEDICARE

## 2020-01-01 ENCOUNTER — APPOINTMENT (OUTPATIENT)
Dept: CV DIAGNOSITCS | Facility: HOSPITAL | Age: 75
End: 2020-01-01

## 2020-01-01 VITALS — HEIGHT: 68 IN

## 2020-01-01 DIAGNOSIS — Z98.890 OTHER SPECIFIED POSTPROCEDURAL STATES: Chronic | ICD-10-CM

## 2020-01-01 DIAGNOSIS — R07.9 CHEST PAIN, UNSPECIFIED: ICD-10-CM

## 2020-01-01 DIAGNOSIS — N18.6 END STAGE RENAL DISEASE: ICD-10-CM

## 2020-01-01 DIAGNOSIS — E87.70 FLUID OVERLOAD, UNSPECIFIED: ICD-10-CM

## 2020-01-01 DIAGNOSIS — E87.6 HYPOKALEMIA: ICD-10-CM

## 2020-01-01 LAB
ALBUMIN SERPL ELPH-MCNC: 2.6 G/DL — LOW (ref 3.3–5)
ALBUMIN SERPL ELPH-MCNC: 3.2 G/DL — LOW (ref 3.3–5)
ALP SERPL-CCNC: 118 U/L — SIGNIFICANT CHANGE UP (ref 40–120)
ALP SERPL-CCNC: 123 U/L — HIGH (ref 40–120)
ALT FLD-CCNC: 16 U/L — SIGNIFICANT CHANGE UP (ref 4–41)
ALT FLD-CCNC: 19 U/L — SIGNIFICANT CHANGE UP (ref 4–41)
ANION GAP SERPL CALC-SCNC: 14 MMO/L — SIGNIFICANT CHANGE UP (ref 7–14)
ANION GAP SERPL CALC-SCNC: 15 MMO/L — HIGH (ref 7–14)
ANION GAP SERPL CALC-SCNC: 18 MMO/L — HIGH (ref 7–14)
ANISOCYTOSIS BLD QL: SLIGHT — SIGNIFICANT CHANGE UP
APTT BLD: 41.3 SEC — HIGH (ref 27–36.3)
AST SERPL-CCNC: 13 U/L — SIGNIFICANT CHANGE UP (ref 4–40)
AST SERPL-CCNC: 17 U/L — SIGNIFICANT CHANGE UP (ref 4–40)
B PERT DNA SPEC QL NAA+PROBE: SIGNIFICANT CHANGE UP
BASE EXCESS BLDA CALC-SCNC: -3.6 MMOL/L — SIGNIFICANT CHANGE UP
BASE EXCESS BLDA CALC-SCNC: 2 MMOL/L — SIGNIFICANT CHANGE UP
BASE EXCESS BLDV CALC-SCNC: 1.5 MMOL/L — SIGNIFICANT CHANGE UP
BASOPHILS # BLD AUTO: 0.01 K/UL — SIGNIFICANT CHANGE UP (ref 0–0.2)
BASOPHILS # BLD AUTO: 0.01 K/UL — SIGNIFICANT CHANGE UP (ref 0–0.2)
BASOPHILS NFR BLD AUTO: 0.1 % — SIGNIFICANT CHANGE UP (ref 0–2)
BASOPHILS NFR BLD AUTO: 0.2 % — SIGNIFICANT CHANGE UP (ref 0–2)
BASOPHILS NFR SPEC: 0 % — SIGNIFICANT CHANGE UP (ref 0–2)
BILIRUB SERPL-MCNC: 0.3 MG/DL — SIGNIFICANT CHANGE UP (ref 0.2–1.2)
BILIRUB SERPL-MCNC: 0.4 MG/DL — SIGNIFICANT CHANGE UP (ref 0.2–1.2)
BLOOD GAS ARTERIAL - FIO2: 100 — SIGNIFICANT CHANGE UP
BLOOD GAS ARTERIAL - FIO2: 50 — SIGNIFICANT CHANGE UP
BLOOD GAS VENOUS - CREATININE: 3.07 MG/DL — HIGH (ref 0.5–1.3)
BLOOD GAS VENOUS - FIO2: 21 — SIGNIFICANT CHANGE UP
BUN SERPL-MCNC: 28 MG/DL — HIGH (ref 7–23)
BUN SERPL-MCNC: 29 MG/DL — HIGH (ref 7–23)
BUN SERPL-MCNC: 58 MG/DL — HIGH (ref 7–23)
C PNEUM DNA SPEC QL NAA+PROBE: SIGNIFICANT CHANGE UP
CA-I BLD-SCNC: 1.06 MMOL/L — SIGNIFICANT CHANGE UP (ref 1.03–1.23)
CALCIUM SERPL-MCNC: 7.6 MG/DL — LOW (ref 8.4–10.5)
CALCIUM SERPL-MCNC: 8.6 MG/DL — SIGNIFICANT CHANGE UP (ref 8.4–10.5)
CALCIUM SERPL-MCNC: 9 MG/DL — SIGNIFICANT CHANGE UP (ref 8.4–10.5)
CHLORIDE BLDA-SCNC: 102 MMOL/L — SIGNIFICANT CHANGE UP (ref 96–108)
CHLORIDE BLDV-SCNC: 103 MMOL/L — SIGNIFICANT CHANGE UP (ref 96–108)
CHLORIDE SERPL-SCNC: 89 MMOL/L — LOW (ref 98–107)
CHLORIDE SERPL-SCNC: 98 MMOL/L — SIGNIFICANT CHANGE UP (ref 98–107)
CHLORIDE SERPL-SCNC: 99 MMOL/L — SIGNIFICANT CHANGE UP (ref 98–107)
CO2 SERPL-SCNC: 25 MMOL/L — SIGNIFICANT CHANGE UP (ref 22–31)
CO2 SERPL-SCNC: 26 MMOL/L — SIGNIFICANT CHANGE UP (ref 22–31)
CO2 SERPL-SCNC: 27 MMOL/L — SIGNIFICANT CHANGE UP (ref 22–31)
CREAT SERPL-MCNC: 1.63 MG/DL — HIGH (ref 0.5–1.3)
CREAT SERPL-MCNC: 1.7 MG/DL — HIGH (ref 0.5–1.3)
CREAT SERPL-MCNC: 2.81 MG/DL — HIGH (ref 0.5–1.3)
DACRYOCYTES BLD QL SMEAR: SLIGHT — SIGNIFICANT CHANGE UP
EOSINOPHIL # BLD AUTO: 0 K/UL — SIGNIFICANT CHANGE UP (ref 0–0.5)
EOSINOPHIL # BLD AUTO: 0 K/UL — SIGNIFICANT CHANGE UP (ref 0–0.5)
EOSINOPHIL NFR BLD AUTO: 0 % — SIGNIFICANT CHANGE UP (ref 0–6)
EOSINOPHIL NFR BLD AUTO: 0 % — SIGNIFICANT CHANGE UP (ref 0–6)
EOSINOPHIL NFR FLD: 0 % — SIGNIFICANT CHANGE UP (ref 0–6)
FLUAV H1 2009 PAND RNA SPEC QL NAA+PROBE: SIGNIFICANT CHANGE UP
FLUAV H1 RNA SPEC QL NAA+PROBE: SIGNIFICANT CHANGE UP
FLUAV H3 RNA SPEC QL NAA+PROBE: SIGNIFICANT CHANGE UP
FLUAV SUBTYP SPEC NAA+PROBE: SIGNIFICANT CHANGE UP
FLUBV RNA SPEC QL NAA+PROBE: SIGNIFICANT CHANGE UP
GAS PNL BLDV: 134 MMOL/L — LOW (ref 136–146)
GIANT PLATELETS BLD QL SMEAR: PRESENT — SIGNIFICANT CHANGE UP
GLUCOSE BLDA-MCNC: 120 MG/DL — HIGH (ref 70–99)
GLUCOSE BLDA-MCNC: 173 MG/DL — HIGH (ref 70–99)
GLUCOSE BLDC GLUCOMTR-MCNC: 101 MG/DL — HIGH (ref 70–99)
GLUCOSE BLDC GLUCOMTR-MCNC: 110 MG/DL — HIGH (ref 70–99)
GLUCOSE BLDC GLUCOMTR-MCNC: 111 MG/DL — HIGH (ref 70–99)
GLUCOSE BLDC GLUCOMTR-MCNC: 48 MG/DL — LOW (ref 70–99)
GLUCOSE BLDC GLUCOMTR-MCNC: 53 MG/DL — LOW (ref 70–99)
GLUCOSE BLDV-MCNC: 105 MG/DL — HIGH (ref 70–99)
GLUCOSE SERPL-MCNC: 104 MG/DL — HIGH (ref 70–99)
GLUCOSE SERPL-MCNC: 413 MG/DL — HIGH (ref 70–99)
GLUCOSE SERPL-MCNC: 72 MG/DL — SIGNIFICANT CHANGE UP (ref 70–99)
HADV DNA SPEC QL NAA+PROBE: SIGNIFICANT CHANGE UP
HBV CORE AB SER-ACNC: NONREACTIVE — SIGNIFICANT CHANGE UP
HBV SURFACE AB SER-ACNC: 3.6 MLU/ML — LOW
HBV SURFACE AG SER-ACNC: NEGATIVE — SIGNIFICANT CHANGE UP
HCO3 BLDA-SCNC: 18 MMOL/L — LOW (ref 22–26)
HCO3 BLDA-SCNC: 24 MMOL/L — SIGNIFICANT CHANGE UP (ref 22–26)
HCO3 BLDV-SCNC: 24 MMOL/L — SIGNIFICANT CHANGE UP (ref 20–27)
HCOV PNL SPEC NAA+PROBE: SIGNIFICANT CHANGE UP
HCT VFR BLD CALC: 44.3 % — SIGNIFICANT CHANGE UP (ref 39–50)
HCT VFR BLD CALC: 47.6 % — SIGNIFICANT CHANGE UP (ref 39–50)
HCT VFR BLDA CALC: 31.9 % — LOW (ref 39–51)
HCT VFR BLDA CALC: 36 % — LOW (ref 39–51)
HCT VFR BLDV CALC: 37.1 % — LOW (ref 39–51)
HCV AB S/CO SERPL IA: 0.13 S/CO — SIGNIFICANT CHANGE UP (ref 0–0.99)
HCV AB SERPL-IMP: SIGNIFICANT CHANGE UP
HGB BLD-MCNC: 11.3 G/DL — LOW (ref 13–17)
HGB BLD-MCNC: 12.2 G/DL — LOW (ref 13–17)
HGB BLDA-MCNC: 10.3 G/DL — LOW (ref 13–17)
HGB BLDA-MCNC: 11.7 G/DL — LOW (ref 13–17)
HGB BLDV-MCNC: 12 G/DL — LOW (ref 13–17)
HMPV RNA SPEC QL NAA+PROBE: SIGNIFICANT CHANGE UP
HPIV1 RNA SPEC QL NAA+PROBE: SIGNIFICANT CHANGE UP
HPIV2 RNA SPEC QL NAA+PROBE: SIGNIFICANT CHANGE UP
HPIV3 RNA SPEC QL NAA+PROBE: SIGNIFICANT CHANGE UP
HPIV4 RNA SPEC QL NAA+PROBE: SIGNIFICANT CHANGE UP
IMM GRANULOCYTES NFR BLD AUTO: 0.5 % — SIGNIFICANT CHANGE UP (ref 0–1.5)
IMM GRANULOCYTES NFR BLD AUTO: 0.6 % — SIGNIFICANT CHANGE UP (ref 0–1.5)
INR BLD: 1.23 — HIGH (ref 0.88–1.16)
LACTATE BLDA-SCNC: 3.1 MMOL/L — HIGH (ref 0.5–2)
LACTATE BLDV-MCNC: 0.9 MMOL/L — SIGNIFICANT CHANGE UP (ref 0.5–2)
LYMPHOCYTES # BLD AUTO: 0.56 K/UL — LOW (ref 1–3.3)
LYMPHOCYTES # BLD AUTO: 0.65 K/UL — LOW (ref 1–3.3)
LYMPHOCYTES # BLD AUTO: 4 % — LOW (ref 13–44)
LYMPHOCYTES # BLD AUTO: 9.3 % — LOW (ref 13–44)
LYMPHOCYTES NFR SPEC AUTO: 2.7 % — LOW (ref 13–44)
MACROCYTES BLD QL: SLIGHT — SIGNIFICANT CHANGE UP
MAGNESIUM SERPL-MCNC: 1.6 MG/DL — SIGNIFICANT CHANGE UP (ref 1.6–2.6)
MAGNESIUM SERPL-MCNC: 1.8 MG/DL — SIGNIFICANT CHANGE UP (ref 1.6–2.6)
MAGNESIUM SERPL-MCNC: 2 MG/DL — SIGNIFICANT CHANGE UP (ref 1.6–2.6)
MCHC RBC-ENTMCNC: 24.4 PG — LOW (ref 27–34)
MCHC RBC-ENTMCNC: 24.5 PG — LOW (ref 27–34)
MCHC RBC-ENTMCNC: 25.5 % — LOW (ref 32–36)
MCHC RBC-ENTMCNC: 25.6 % — LOW (ref 32–36)
MCV RBC AUTO: 95.4 FL — SIGNIFICANT CHANGE UP (ref 80–100)
MCV RBC AUTO: 95.9 FL — SIGNIFICANT CHANGE UP (ref 80–100)
METAMYELOCYTES # FLD: 7.1 % — HIGH (ref 0–1)
MONOCYTES # BLD AUTO: 0.03 K/UL — SIGNIFICANT CHANGE UP (ref 0–0.9)
MONOCYTES # BLD AUTO: 0.64 K/UL — SIGNIFICANT CHANGE UP (ref 0–0.9)
MONOCYTES NFR BLD AUTO: 0.5 % — LOW (ref 2–14)
MONOCYTES NFR BLD AUTO: 4 % — SIGNIFICANT CHANGE UP (ref 2–14)
MONOCYTES NFR BLD: 4.5 % — SIGNIFICANT CHANGE UP (ref 2–9)
NEUTROPHIL AB SER-ACNC: 49.1 % — SIGNIFICANT CHANGE UP (ref 43–77)
NEUTROPHILS # BLD AUTO: 14.66 K/UL — HIGH (ref 1.8–7.4)
NEUTROPHILS # BLD AUTO: 5.38 K/UL — SIGNIFICANT CHANGE UP (ref 1.8–7.4)
NEUTROPHILS NFR BLD AUTO: 89.5 % — HIGH (ref 43–77)
NEUTROPHILS NFR BLD AUTO: 91.3 % — HIGH (ref 43–77)
NEUTS BAND # BLD: 34.8 % — HIGH (ref 0–6)
NRBC # FLD: 0 K/UL — SIGNIFICANT CHANGE UP (ref 0–0)
NRBC # FLD: 0 K/UL — SIGNIFICANT CHANGE UP (ref 0–0)
OVALOCYTES BLD QL SMEAR: SIGNIFICANT CHANGE UP
PCO2 BLDA: 137 MMHG — CRITICAL HIGH (ref 35–48)
PCO2 BLDA: 89 MMHG — CRITICAL HIGH (ref 35–48)
PCO2 BLDV: 82 MMHG — CRITICAL HIGH (ref 41–51)
PH BLDA: 6.94 PH — CRITICAL LOW (ref 7.35–7.45)
PH BLDA: 7.16 PH — CRITICAL LOW (ref 7.35–7.45)
PH BLDV: 7.18 PH — CRITICAL LOW (ref 7.32–7.43)
PHOSPHATE SERPL-MCNC: 1.2 MG/DL — LOW (ref 2.5–4.5)
PHOSPHATE SERPL-MCNC: 3.1 MG/DL — SIGNIFICANT CHANGE UP (ref 2.5–4.5)
PLATELET # BLD AUTO: 184 K/UL — SIGNIFICANT CHANGE UP (ref 150–400)
PLATELET # BLD AUTO: 198 K/UL — SIGNIFICANT CHANGE UP (ref 150–400)
PLATELET COUNT - ESTIMATE: NORMAL — SIGNIFICANT CHANGE UP
PMV BLD: 11.3 FL — SIGNIFICANT CHANGE UP (ref 7–13)
PMV BLD: 9.6 FL — SIGNIFICANT CHANGE UP (ref 7–13)
PO2 BLDA: 214 MMHG — HIGH (ref 83–108)
PO2 BLDA: 39 MMHG — CRITICAL LOW (ref 83–108)
PO2 BLDV: 88 MMHG — HIGH (ref 35–40)
POIKILOCYTOSIS BLD QL AUTO: SLIGHT — SIGNIFICANT CHANGE UP
POTASSIUM BLDA-SCNC: 2.7 MMOL/L — CRITICAL LOW (ref 3.4–4.5)
POTASSIUM BLDA-SCNC: 3.6 MMOL/L — SIGNIFICANT CHANGE UP (ref 3.4–4.5)
POTASSIUM BLDV-SCNC: 2.7 MMOL/L — CRITICAL LOW (ref 3.4–4.5)
POTASSIUM SERPL-MCNC: 2.7 MMOL/L — CRITICAL LOW (ref 3.5–5.3)
POTASSIUM SERPL-MCNC: 2.9 MMOL/L — CRITICAL LOW (ref 3.5–5.3)
POTASSIUM SERPL-MCNC: 3 MMOL/L — LOW (ref 3.5–5.3)
POTASSIUM SERPL-SCNC: 2.7 MMOL/L — CRITICAL LOW (ref 3.5–5.3)
POTASSIUM SERPL-SCNC: 2.9 MMOL/L — CRITICAL LOW (ref 3.5–5.3)
POTASSIUM SERPL-SCNC: 3 MMOL/L — LOW (ref 3.5–5.3)
PROT SERPL-MCNC: 5.9 G/DL — LOW (ref 6–8.3)
PROT SERPL-MCNC: 6.4 G/DL — SIGNIFICANT CHANGE UP (ref 6–8.3)
PROTHROM AB SERPL-ACNC: 13.9 SEC — HIGH (ref 10.6–13.6)
RAPID RVP RESULT: SIGNIFICANT CHANGE UP
RBC # BLD: 4.62 M/UL — SIGNIFICANT CHANGE UP (ref 4.2–5.8)
RBC # BLD: 4.99 M/UL — SIGNIFICANT CHANGE UP (ref 4.2–5.8)
RBC # FLD: 16.9 % — HIGH (ref 10.3–14.5)
RBC # FLD: 16.9 % — HIGH (ref 10.3–14.5)
RSV RNA SPEC QL NAA+PROBE: SIGNIFICANT CHANGE UP
RV+EV RNA SPEC QL NAA+PROBE: SIGNIFICANT CHANGE UP
SAO2 % BLDA: 55.6 % — LOW (ref 95–99)
SAO2 % BLDA: 97.3 % — SIGNIFICANT CHANGE UP (ref 95–99)
SAO2 % BLDV: 93 % — HIGH (ref 60–85)
SARS-COV-2 RNA SPEC QL NAA+PROBE: SIGNIFICANT CHANGE UP
SCHISTOCYTES BLD QL AUTO: SLIGHT — SIGNIFICANT CHANGE UP
SMUDGE CELLS # BLD: PRESENT — SIGNIFICANT CHANGE UP
SODIUM BLDA-SCNC: 134 MMOL/L — LOW (ref 136–146)
SODIUM BLDA-SCNC: 137 MMOL/L — SIGNIFICANT CHANGE UP (ref 136–146)
SODIUM SERPL-SCNC: 134 MMOL/L — LOW (ref 135–145)
SODIUM SERPL-SCNC: 138 MMOL/L — SIGNIFICANT CHANGE UP (ref 135–145)
SODIUM SERPL-SCNC: 139 MMOL/L — SIGNIFICANT CHANGE UP (ref 135–145)
VARIANT LYMPHS # BLD: 1.8 % — SIGNIFICANT CHANGE UP
WBC # BLD: 16.06 K/UL — HIGH (ref 3.8–10.5)
WBC # BLD: 6.01 K/UL — SIGNIFICANT CHANGE UP (ref 3.8–10.5)
WBC # FLD AUTO: 16.06 K/UL — HIGH (ref 3.8–10.5)
WBC # FLD AUTO: 6.01 K/UL — SIGNIFICANT CHANGE UP (ref 3.8–10.5)

## 2020-01-01 PROCEDURE — 74019 RADEX ABDOMEN 2 VIEWS: CPT | Mod: 26

## 2020-01-01 PROCEDURE — 99223 1ST HOSP IP/OBS HIGH 75: CPT

## 2020-01-01 PROCEDURE — 71045 X-RAY EXAM CHEST 1 VIEW: CPT | Mod: 26

## 2020-01-01 PROCEDURE — 99291 CRITICAL CARE FIRST HOUR: CPT

## 2020-01-01 PROCEDURE — 99291 CRITICAL CARE FIRST HOUR: CPT | Mod: 25

## 2020-01-01 RX ORDER — IPRATROPIUM/ALBUTEROL SULFATE 18-103MCG
3 AEROSOL WITH ADAPTER (GRAM) INHALATION EVERY 4 HOURS
Refills: 0 | Status: DISCONTINUED | OUTPATIENT
Start: 2020-01-01 | End: 2020-01-01

## 2020-01-01 RX ORDER — DOXAZOSIN MESYLATE 4 MG
1 TABLET ORAL AT BEDTIME
Refills: 0 | Status: DISCONTINUED | OUTPATIENT
Start: 2020-01-01 | End: 2020-01-01

## 2020-01-01 RX ORDER — HEPARIN SODIUM 5000 [USP'U]/ML
7500 INJECTION INTRAVENOUS; SUBCUTANEOUS EVERY 8 HOURS
Refills: 0 | Status: DISCONTINUED | OUTPATIENT
Start: 2020-01-01 | End: 2020-01-01

## 2020-01-01 RX ORDER — DOXAZOSIN MESYLATE 4 MG
1 TABLET ORAL
Qty: 0 | Refills: 0 | DISCHARGE

## 2020-01-01 RX ORDER — GLYCERIN ADULT
1 SUPPOSITORY, RECTAL RECTAL ONCE
Refills: 0 | Status: DISCONTINUED | OUTPATIENT
Start: 2020-01-01 | End: 2020-01-01

## 2020-01-01 RX ORDER — IPRATROPIUM BROMIDE 0.2 MG/ML
1 SOLUTION, NON-ORAL INHALATION EVERY 6 HOURS
Refills: 0 | Status: DISCONTINUED | OUTPATIENT
Start: 2020-01-01 | End: 2020-01-01

## 2020-01-01 RX ORDER — TAMSULOSIN HYDROCHLORIDE 0.4 MG/1
0.4 CAPSULE ORAL AT BEDTIME
Refills: 0 | Status: DISCONTINUED | OUTPATIENT
Start: 2020-01-01 | End: 2020-01-01

## 2020-01-01 RX ORDER — PANTOPRAZOLE SODIUM 20 MG/1
40 TABLET, DELAYED RELEASE ORAL DAILY
Refills: 0 | Status: DISCONTINUED | OUTPATIENT
Start: 2020-01-01 | End: 2020-01-01

## 2020-01-01 RX ORDER — PIPERACILLIN AND TAZOBACTAM 4; .5 G/20ML; G/20ML
3.38 INJECTION, POWDER, LYOPHILIZED, FOR SOLUTION INTRAVENOUS EVERY 8 HOURS
Refills: 0 | Status: DISCONTINUED | OUTPATIENT
Start: 2020-01-01 | End: 2020-01-01

## 2020-01-01 RX ORDER — SEVELAMER CARBONATE 2400 MG/1
1 POWDER, FOR SUSPENSION ORAL
Qty: 0 | Refills: 0 | DISCHARGE

## 2020-01-01 RX ORDER — PIPERACILLIN AND TAZOBACTAM 4; .5 G/20ML; G/20ML
3.38 INJECTION, POWDER, LYOPHILIZED, FOR SOLUTION INTRAVENOUS EVERY 12 HOURS
Refills: 0 | Status: DISCONTINUED | OUTPATIENT
Start: 2020-01-01 | End: 2020-01-01

## 2020-01-01 RX ORDER — ASPIRIN/CALCIUM CARB/MAGNESIUM 324 MG
81 TABLET ORAL DAILY
Refills: 0 | Status: DISCONTINUED | OUTPATIENT
Start: 2020-01-01 | End: 2020-01-01

## 2020-01-01 RX ORDER — DEXTROSE 50 % IN WATER 50 %
50 SYRINGE (ML) INTRAVENOUS ONCE
Refills: 0 | Status: COMPLETED | OUTPATIENT
Start: 2020-01-01 | End: 2020-01-01

## 2020-01-01 RX ORDER — CHLORHEXIDINE GLUCONATE 213 G/1000ML
1 SOLUTION TOPICAL
Refills: 0 | Status: DISCONTINUED | OUTPATIENT
Start: 2020-01-01 | End: 2020-01-01

## 2020-01-01 RX ORDER — TIOTROPIUM BROMIDE 18 UG/1
1 CAPSULE ORAL; RESPIRATORY (INHALATION)
Qty: 0 | Refills: 0 | DISCHARGE

## 2020-01-01 RX ORDER — NOREPINEPHRINE BITARTRATE/D5W 8 MG/250ML
0.05 PLASTIC BAG, INJECTION (ML) INTRAVENOUS
Qty: 8 | Refills: 0 | Status: DISCONTINUED | OUTPATIENT
Start: 2020-01-01 | End: 2020-01-01

## 2020-01-01 RX ORDER — ATORVASTATIN CALCIUM 80 MG/1
40 TABLET, FILM COATED ORAL AT BEDTIME
Refills: 0 | Status: DISCONTINUED | OUTPATIENT
Start: 2020-01-01 | End: 2020-01-01

## 2020-01-01 RX ORDER — AMIODARONE HYDROCHLORIDE 400 MG/1
200 TABLET ORAL DAILY
Refills: 0 | Status: DISCONTINUED | OUTPATIENT
Start: 2020-01-01 | End: 2020-01-01

## 2020-01-01 RX ORDER — CLOPIDOGREL BISULFATE 75 MG/1
75 TABLET, FILM COATED ORAL DAILY
Refills: 0 | Status: DISCONTINUED | OUTPATIENT
Start: 2020-01-01 | End: 2020-01-01

## 2020-01-01 RX ORDER — POTASSIUM CHLORIDE 20 MEQ
10 PACKET (EA) ORAL
Refills: 0 | Status: COMPLETED | OUTPATIENT
Start: 2020-01-01 | End: 2020-01-01

## 2020-01-01 RX ORDER — PIPERACILLIN AND TAZOBACTAM 4; .5 G/20ML; G/20ML
2.25 INJECTION, POWDER, LYOPHILIZED, FOR SOLUTION INTRAVENOUS EVERY 6 HOURS
Refills: 0 | Status: DISCONTINUED | OUTPATIENT
Start: 2020-01-01 | End: 2020-01-01

## 2020-01-01 RX ORDER — TIOTROPIUM BROMIDE 18 UG/1
1 CAPSULE ORAL; RESPIRATORY (INHALATION) DAILY
Refills: 0 | Status: DISCONTINUED | OUTPATIENT
Start: 2020-01-01 | End: 2020-01-01

## 2020-01-01 RX ORDER — PIPERACILLIN AND TAZOBACTAM 4; .5 G/20ML; G/20ML
3.38 INJECTION, POWDER, LYOPHILIZED, FOR SOLUTION INTRAVENOUS ONCE
Refills: 0 | Status: COMPLETED | OUTPATIENT
Start: 2020-01-01 | End: 2020-01-01

## 2020-01-01 RX ORDER — METOPROLOL TARTRATE 50 MG
25 TABLET ORAL
Refills: 0 | Status: DISCONTINUED | OUTPATIENT
Start: 2020-01-01 | End: 2020-01-01

## 2020-01-01 RX ORDER — ALBUTEROL 90 UG/1
2 AEROSOL, METERED ORAL EVERY 6 HOURS
Refills: 0 | Status: DISCONTINUED | OUTPATIENT
Start: 2020-01-01 | End: 2020-01-01

## 2020-01-01 RX ORDER — POTASSIUM CHLORIDE 20 MEQ
10 PACKET (EA) ORAL
Refills: 0 | Status: DISCONTINUED | OUTPATIENT
Start: 2020-01-01 | End: 2020-01-01

## 2020-01-01 RX ORDER — MIDODRINE HYDROCHLORIDE 2.5 MG/1
1 TABLET ORAL
Qty: 0 | Refills: 0 | DISCHARGE

## 2020-01-01 RX ORDER — HEPARIN SODIUM 5000 [USP'U]/ML
5000 INJECTION INTRAVENOUS; SUBCUTANEOUS EVERY 8 HOURS
Refills: 0 | Status: DISCONTINUED | OUTPATIENT
Start: 2020-01-01 | End: 2020-01-01

## 2020-01-01 RX ORDER — SODIUM CHLORIDE 9 MG/ML
1000 INJECTION, SOLUTION INTRAVENOUS
Refills: 0 | Status: DISCONTINUED | OUTPATIENT
Start: 2020-01-01 | End: 2020-01-01

## 2020-01-01 RX ORDER — VANCOMYCIN HCL 1 G
1000 VIAL (EA) INTRAVENOUS ONCE
Refills: 0 | Status: COMPLETED | OUTPATIENT
Start: 2020-01-01 | End: 2020-01-01

## 2020-01-01 RX ORDER — SEVELAMER CARBONATE 2400 MG/1
800 POWDER, FOR SUSPENSION ORAL THREE TIMES A DAY
Refills: 0 | Status: DISCONTINUED | OUTPATIENT
Start: 2020-01-01 | End: 2020-01-01

## 2020-01-01 RX ORDER — HYDROMORPHONE HYDROCHLORIDE 2 MG/ML
0.25 INJECTION INTRAMUSCULAR; INTRAVENOUS; SUBCUTANEOUS
Refills: 0 | Status: DISCONTINUED | OUTPATIENT
Start: 2020-01-01 | End: 2020-01-01

## 2020-01-01 RX ORDER — FERROUS SULFATE 325(65) MG
0 TABLET ORAL
Qty: 0 | Refills: 0 | DISCHARGE

## 2020-01-01 RX ADMIN — Medication 100 MILLIEQUIVALENT(S): at 12:57

## 2020-01-01 RX ADMIN — HEPARIN SODIUM 5000 UNIT(S): 5000 INJECTION INTRAVENOUS; SUBCUTANEOUS at 05:17

## 2020-01-01 RX ADMIN — Medication 5.26 MICROGRAM(S)/KG/MIN: at 13:30

## 2020-01-01 RX ADMIN — Medication 100 MILLIEQUIVALENT(S): at 14:24

## 2020-01-01 RX ADMIN — Medication 100 MILLIEQUIVALENT(S): at 04:38

## 2020-01-01 RX ADMIN — HYDROMORPHONE HYDROCHLORIDE 0.25 MILLIGRAM(S): 2 INJECTION INTRAMUSCULAR; INTRAVENOUS; SUBCUTANEOUS at 17:04

## 2020-01-01 RX ADMIN — HYDROMORPHONE HYDROCHLORIDE 0.25 MILLIGRAM(S): 2 INJECTION INTRAMUSCULAR; INTRAVENOUS; SUBCUTANEOUS at 16:49

## 2020-01-01 RX ADMIN — CHLORHEXIDINE GLUCONATE 1 APPLICATION(S): 213 SOLUTION TOPICAL at 08:26

## 2020-01-01 RX ADMIN — PIPERACILLIN AND TAZOBACTAM 25 GRAM(S): 4; .5 INJECTION, POWDER, LYOPHILIZED, FOR SOLUTION INTRAVENOUS at 17:15

## 2020-01-01 RX ADMIN — Medication 100 MILLIEQUIVALENT(S): at 06:22

## 2020-01-01 RX ADMIN — Medication 3 MILLILITER(S): at 04:34

## 2020-01-01 RX ADMIN — SODIUM CHLORIDE 50 MILLILITER(S): 9 INJECTION, SOLUTION INTRAVENOUS at 09:20

## 2020-01-01 RX ADMIN — Medication 3 MILLILITER(S): at 08:47

## 2020-01-01 RX ADMIN — PANTOPRAZOLE SODIUM 40 MILLIGRAM(S): 20 TABLET, DELAYED RELEASE ORAL at 12:57

## 2020-01-01 RX ADMIN — Medication 100 MILLIEQUIVALENT(S): at 05:16

## 2020-01-01 RX ADMIN — Medication 3 MILLILITER(S): at 21:39

## 2020-01-01 RX ADMIN — PIPERACILLIN AND TAZOBACTAM 200 GRAM(S): 4; .5 INJECTION, POWDER, LYOPHILIZED, FOR SOLUTION INTRAVENOUS at 06:37

## 2020-01-01 RX ADMIN — HEPARIN SODIUM 5000 UNIT(S): 5000 INJECTION INTRAVENOUS; SUBCUTANEOUS at 23:49

## 2020-01-01 RX ADMIN — Medication 3 MILLILITER(S): at 01:33

## 2020-01-01 RX ADMIN — HEPARIN SODIUM 5000 UNIT(S): 5000 INJECTION INTRAVENOUS; SUBCUTANEOUS at 15:13

## 2020-01-01 RX ADMIN — Medication 50 MILLILITER(S): at 06:47

## 2020-01-01 RX ADMIN — Medication 100 MILLIEQUIVALENT(S): at 11:53

## 2020-01-01 RX ADMIN — Medication 250 MILLIGRAM(S): at 11:54

## 2020-01-20 NOTE — ED PROVIDER NOTE - CPE EDP GASTRO NORM
normal... No Repair - Repaired With Adjacent Surgical Defect Text (Leave Blank If You Do Not Want): After obtaining clear surgical margins the defect was repaired concurrently with another surgical defect which was in close approximation.

## 2020-06-23 NOTE — PROGRESS NOTE ADULT - PROBLEM SELECTOR PROBLEM 7
Quality 130: Documentation Of Current Medications In The Medical Record: Current Medications Documented Detail Level: Detailed Essential hypertension

## 2020-07-08 NOTE — PROGRESS NOTE ADULT - PROBLEM SELECTOR PROBLEM 8
Type 2 diabetes mellitus Eye Protection Verbiage: Before proceeding with the stage, a plastic scleral shield was inserted. The globe was anesthetized with a few drops of 1% lidocaine with 1:100,000 epinephrine. Then, an appropriate sized scleral shield was chosen and coated with lacrilube ointment. The shield was gently inserted and left in place for the duration of each stage. After the stage was completed, the shield was gently removed.

## 2020-08-05 NOTE — DISCHARGE NOTE ADULT - LAUNCH MEDICATION RECONCILIATION
Subjective:       Patient ID:  Freddy Meza Jr. is a 22 y.o. male who presents for   Chief Complaint   Patient presents with    Warts     follow up      Hx of VV of the right 5th digit, last seen on 7/8/20.  He is s/p paring and cryo at last visit. He has been taking cimetdine once daily, prescribed bid.  He is also using compounded sal acid 70% daily x 2 weeks.     Prior tx:  s/p 2 shave biopsies showing VV on 5/20/20, 7/17/20, aldara x 2 courses with gradual return, s/p cryotherapy.        Review of Systems   Constitutional: Negative for fever and chills.   Gastrointestinal: Negative for nausea and vomiting.   Skin: Positive for activity-related sunscreen use. Negative for daily sunscreen use and recent sunburn.   Hematologic/Lymphatic: Does not bruise/bleed easily.        Objective:    Physical Exam   Constitutional: He appears well-developed and well-nourished. No distress.   Neurological: He is alert and oriented to person, place, and time. He is not disoriented.   Psychiatric: He has a normal mood and affect.   Skin:   Areas Examined (abnormalities noted in diagram):   Head / Face Inspection Performed  Neck Inspection Performed  RUE Inspected  LUE Inspection Performed             Diagram Legend       Verrucoid papule consistent consistent with wart     Assessment / Plan:        Verruca vulgaris    Continue sal acid cream, increase cimetdine to bid. The patient acknowledged understanding.  Discussed diagnosis, AVS given.  Will treat with cryotherapy.      Cryosurgery procedure note:    After risks, benefits, and alternatives discussed, including blistering, pain, scar, recurrence, allergy to anesthesia (if given), hyper- and hypopigmentation, verbal consent from the patient is obtained.  Liquid nitrogen cryosurgery is applied to 1 verruca with prior paring, as detailed in the physical exam, to produce a freeze injury. 2 consecutive freeze thaw cycles are applied to each verruca. The patient is aware that  blisters (possibly blood blisters) may form.           Follow up in about 4 months (around 12/5/2020).   <<-----Click here for Discharge Medication Review

## 2020-11-03 NOTE — H&P ADULT - NSHPPHYSICALEXAM_GEN_ALL_CORE
Vitals: I have reviewed the patients vital signs  General: ill appearing, responsive to touch  HEENT: atraumatic, normocephalic, airway patent,   Neck: no tracheal deviation  Chest/Lungs: no trauma, symmetric chest rise, diffuse rhonchi worse on L  Heart: Regular rate, skin pale, 2+ pulses  Abdomen: Soft and nontender throughout, mildly distended  Neuro: non verbal, somnolent, responsive to touch  MSK: moving all extremities  Skin: no active bleeding

## 2020-11-03 NOTE — ED PROVIDER NOTE - CLINICAL SUMMARY MEDICAL DECISION MAKING FREE TEXT BOX
75M here from [facility] for sob on nrb found to be in fluid overload. Patient desatting to 77 on nrb and bipap, satting high 80s on AVAPS. Will call MICU, likely admit. ?code dialysis.

## 2020-11-03 NOTE — CONSULT NOTE ADULT - PROBLEM SELECTOR RECOMMENDATION 3
will schedule for urgent HD today goal UF 1.5L.   If persistently overload tentative UF for tomorrow. will schedule for urgent HD today goal UF 1.5L.   If persistently overload tentative UF for tomorrow.      Discussed with Nephrology attending on call Dr. Gonzales.

## 2020-11-03 NOTE — ED PROVIDER NOTE - OBJECTIVE STATEMENT
75M Tuvaluan speaking with T2DM, HTN, CAD s/p 12-14 stents placed in 1990's, and BABAR x 5 placed in Sept 2017, CHF with EF 20%, AF (not a/c 2/2 GIB), CVA , ESRD on HD (MWF) COPD, bipolar disorder, dementia here for SOB, desatting to high 70s, low 80s on non-rebreather.

## 2020-11-03 NOTE — CONSULT NOTE ADULT - SUBJECTIVE AND OBJECTIVE BOX
[facility] DIVISION OF KIDNEY DISEASES AND HYPERTENSION -- [phone]  -- INITIAL CONSULT NOTE  --------------------------------------------------------------------------------  If any questions, please feel free to contact me  NS pager: [phone], [facility]: [phone]  Bill Akers M.D.  Nephrology Fellow    (After 5 pm or on weekends please page the on-call fellow)  --------------------------------------------------------------------------------    HPI:  75 y.o. Male Nepalese speaking with T2DM, HTN, CAD s/p 12-14 stents placed in 1990's, and BABAR x 5 placed in Sept 2017, CHF with EF 20%, AF (not a/c 2/2 GIB), CVA, ESRD on HD (MWF), COPD, bipolar disorder, dementia sent from [facility] for SOB and hypoxia. Patient's daughter at bedside reports that patient has been with decreased po intake for the past 2 weeks, and worsening mental status. As per Dialysis unit patient completed 3hrs of treatment yesterday with 1L fluid removal. Today noted to be hypoxic to high 70s, At ED placed on non rebreather and improved to only low 80s. Now on BiPAP O2sat improved to 94-97. Also noted hypokalemic to 2.9.     Nephrology consulted for ESRD management      PAST HISTORY  --------------------------------------------------------------------------------  PAST MEDICAL & SURGICAL HISTORY:  CVA (cerebral vascular accident)    COPD (chronic obstructive pulmonary disease)    BPH (benign prostatic hyperplasia)    Bipolar affective disorder    CKD (chronic kidney disease)    Pancreatitis    Hypertension    Dyslipidemia    Diabetes mellitus    Coronary artery disease    HLD (hyperlipidemia)    Anemia    Acute renal failure    CKD (chronic kidney disease)    COPD (chronic obstructive pulmonary disease)    Fainting    Cardiac arrhythmia    Dizziness    Hydronephrosis    Cholecystitis    Bipolar 1 disorder    DM (diabetes mellitus)    HTN (hypertension)    Lumbar radiculopathy    Left heart failure    Reflux esophagitis    Coronary atherosclerosis    History of cardiac catheterization    No significant past surgical history      FAMILY HISTORY:  No pertinent family history in first degree relatives      PAST SOCIAL HISTORY:  unable to obtain     ALLERGIES & MEDICATIONS  --------------------------------------------------------------------------------  Allergies    No Known Allergies    Intolerances      Standing Inpatient Medications    PRN Inpatient Medications      REVIEW OF SYSTEMS  --------------------------------------------------------------------------------  unable to obtain patient non verbal at this time.     VITALS/PHYSICAL EXAM  --------------------------------------------------------------------------------  T(C): 36.8 (11-03-20 @ 17:18), Max: 36.8 (11-03-20 @ 17:18)  HR: 71 (11-03-20 @ 17:18) (71 - 71)  BP: 125/50 (11-03-20 @ 17:18) (125/50 - 125/50)  RR: 21 (11-03-20 @ 17:18) (21 - 21)  SpO2: 94% (11-03-20 @ 17:18) (94% - 94%)  Wt(kg): --  Height (cm): 172.7 (11-03-20 @ 16:23)      Physical Exam:  	Gen: non verbal, tachypneic on BiPAP   	HEENT: BIPAP mask  	Pulm: coarse breath sounds   	CV: S1S2, RRR  	Abd: Soft, +BS, +ve edema of abd wall.    	Ext: No LE edema B/L  	Neuro: non verbal  	Skin: Warm and dry     	Vascular access: right sided AVF +ve thrill and +ve bruit     LABS/STUDIES  --------------------------------------------------------------------------------              11.3   16.06 >-----------<  198      [11-03-20 @ 16:38]              44.3     139  |  98  |  58  ----------------------------<  104      [11-03-20 @ 16:38]  2.9   |  26  |  2.81        Ca     9.0     [11-03-20 @ 16:38]      Mg     2.0     [11-03-20 @ 16:38]      Phos  3.1     [11-03-20 @ 16:38]    TPro  6.4  /  Alb  3.2  /  TBili  0.3  /  DBili  x   /  AST  13  /  ALT  16  /  AlkPhos  123  [11-03-20 @ 16:38]    PT/INR: PT 13.9 , INR 1.23       [11-03-20 @ 16:38]  PTT: 41.3       [11-03-20 @ 16:38]      Creatinine Trend:  SCr 2.81 [11-03 @ 16:38]    Urinalysis - [09-07-17 @ 19:24]      Color Yellow / Appearance Clear / SG 1.020 / pH 5.0      Gluc Negative / Ketone Trace  / Bili Negative / Urobili Negative       Blood Large / Protein 100 / Leuk Est Small / Nitrite Negative      RBC >50 / WBC 6-10 / Hyaline  / Gran  / Sq Epi  / Non Sq Epi Few / Bacteria Moderate      HbA1c 6.4      [07-12-18 @ 07:59]    HBsAb <3.0      [10-18-18 @ 18:00]  HBsAg NEGATIVE      [10-18-18 @ 18:00]  HBcAb Nonreactive      [10-18-18 @ 18:00]  HCV 0.23, Nonreactive Hepatitis C AB  S/CO Ratio                        Interpretation  < 1.0                                     Non-Reactive  1.0 - 4.9                           Weakly-Reactive  > 5.0                                 Reactive  Non-Reactive: Aperson with a non-reactive HCV antibody  result is considered uninfected.  No further action is  needed unless recent infection is suspected.  In these  cases, consider repeat testing later to detect  seroconversion..  Weakly-Reactive: HCV antibody test is abnormal, HCV RNA  Qualitative test will follow.  Reactive: HCV antibody test is abnormal, HCV RNA  Qualitative test will follow.  Note: HCV antibody testing is performed on the [employer]   system.      [10-18-18 @ 18:00]

## 2020-11-03 NOTE — ED ADULT NURSE NOTE - COVID-19 RESULT
[No Acute Distress] : in no acute distress [Well developed] : well developed [Well Nourished] : ~L well nourished [Oriented x3] : oriented to person, place, and time [Normal Memory] : ~T memory was ~L unimpaired [Normal Mood/Affect] : mood and affect are normal [Cough] : no cough [No Edema] : ~T edema was not present [Tenderness] : ~T no ~M abdominal tenderness observed [Distended] : not distended [None] : no CVA tenderness [Inguinal LAD] : no adenopathy was noted in the inguinal lymph nodes NEGATIVE [Warm and Dry] : was warm and dry to touch [Normal Gait] : gait was normal [Vulvar Atrophy] : vulvar atrophy [Labia Majora] : were normal [Labia Minora] : were normal [Normal Appearance] : general appearance was normal [No Bleeding] : there was no active vaginal bleeding [2] : 2 [Aa ____] : Aa [unfilled] [Ba ____] : Ba [unfilled] [C ____] : C [unfilled] [GH ____] : GH [unfilled] [PB ____] : PB [unfilled] [TVL ____] : TVL  [unfilled] [Ap ____] : Ap [unfilled] [Bp ____] : Bp [unfilled] [D ____] : D [unfilled] [] : IV [Soft] :  the cervix was soft [Uterine Adnexae] : were not tender and not enlarged [Normal] : no abnormalities [Post Void Residual ____ml] : post void residual via catheterization was [unfilled] ml [FreeTextEntry3] : hypermobility based on POPQ

## 2020-11-03 NOTE — ED ADULT TRIAGE NOTE - CHIEF COMPLAINT QUOTE
Notification brought from [place] for respiratory distress. SpO2 84 on RA, placed on non-rebreather. Pt. brought straight to room.

## 2020-11-03 NOTE — H&P ADULT - NSICDXPASTMEDICALHX_GEN_ALL_CORE_FT
PAST MEDICAL HISTORY:  Acute renal failure     Anemia     Bipolar 1 disorder     Bipolar affective disorder     BPH (benign prostatic hyperplasia)     Cardiac arrhythmia     Cholecystitis     CKD (chronic kidney disease)     CKD (chronic kidney disease)     COPD (chronic obstructive pulmonary disease)     COPD (chronic obstructive pulmonary disease)     Coronary artery disease     Coronary atherosclerosis     CVA (cerebral vascular accident)     Diabetes mellitus     Dizziness     DM (diabetes mellitus)     Dyslipidemia     Fainting     HLD (hyperlipidemia)     HTN (hypertension)     Hydronephrosis     Hypertension     Left heart failure     Lumbar radiculopathy     Pancreatitis     Reflux esophagitis

## 2020-11-03 NOTE — ED PROVIDER NOTE - CRITICAL CARE PROVIDED
consult w/ pt's family directly relating to pts condition/direct patient care (not related to procedure)/documentation/additional history taking/conducted a detailed discussion of DNR status/interpretation of diagnostic studies/consultation with other physicians

## 2020-11-03 NOTE — H&P ADULT - ASSESSMENT
75M Colombian speaking with T2DM, HTN, CAD s/p 12-14 stents placed in 1990's, and BABAR x 5 placed in Sept 2017, CHF with EF 20%, AF (not a/c 2/2 GIB), CVA , ESRD on HD (MWF) COPD, dementia, brought in from [place] for SOB. Found to have whiteout of left lung, hypokalemia, acidosis. Concern for fluid overload possibly with underlying COPD exacerbation/atelectasis    Neuro  - baseline dementia, more confused, nonverbal    Cards  - on ASA/Plavix will hold while NPO and restart after      Pulm  - hypercapnic respiratory failure, whiteout of left lung, fluid overload  - Emergent HD then reassess respiratory status  - On AVAPs, trend blood gas    GI  - NPO for mental status and mask for AVAPs, will reassess after    Renal  - ESRD, dialyzed yesterday, code dialysis for fluid overload  - Emergent HD tonight  - Hypokalemic will reassess BMP after HD  - VBG 7.18/82/88, resp acidosis, some correction as bicarb 26    Endo  - HX T2DM, glucose normal, will monitor    ID  - whiteout of left lung on CXR, afebrile though has a leukoctosis  - Will reassess need for abx after HX    Ethics  - DNR/DNI

## 2020-11-03 NOTE — CHART NOTE - NSCHARTNOTEFT_GEN_A_CORE
Dialysis Consent   Thoroughly reviewed risks and benefits of RRT/HD with patient's daughter who agrees to dialytic therapy. All questions answered.  obtained by Dr. Akers    PAPER FORM CONSENT obtained and placed in patient's chart.

## 2020-11-03 NOTE — H&P ADULT - NSICDXPASTSURGICALHX_GEN_ALL_CORE_FT
PAST SURGICAL HISTORY:  History of cardiac catheterization     No significant past surgical history

## 2020-11-03 NOTE — ED PROVIDER NOTE - PHYSICAL EXAMINATION
Gen: WDWN, NAD  HEENT: EOMI, no nasal discharge, mucous membranes moist  CV: RRR, +S1/S2, no M/R/G  Resp: Crackles diffusely  GI: Abdomen soft non-distended, NTTP, no masses  MSK: No open wounds, no bruising, no LE edema  Neuro: A&Ox4, following commands, moving all four extremities spontaneously  Psych: appropriate mood, denies AH, VH, SI

## 2020-11-03 NOTE — CONSULT NOTE ADULT - ASSESSMENT
75 y.o Male sent from [facility] for hypoxia and SOB, hypoxic to 70's requiring BiPAP -Nephrology consulted for ESRD management

## 2020-11-03 NOTE — H&P ADULT - ATTENDING COMMENTS
pt is a 74 yo male with hx esrd,  copd, htn, hld, dementia, hx cardiac arrest  who presents from [facility] with decreased mental status, and hypoxemia,  Pt had hd one day ago, in the er noted k 2.9, cxr left sided atelectasis,    PE pt opens eyes nonverbal , lungs rhonchi b/l left greather than right,  heart s1s2 abdomen benign, ext no edema, labs noted, wbc 16, bicarb 26 cr 2.8  cxr left sided atelectasis, right opacities and vas congestion,    -74 yo male with hypoxemic respiratory failure, left sided atelectasis  , for urgent  hemodialysis for pulmonary edema,   -chest pt, bronchodilators,  -repeat bmp  -panculture if temp spike  -continue bp meds,  -aspiration precaution  -monitor hemodynamically

## 2020-11-03 NOTE — H&P ADULT - HISTORY OF PRESENT ILLNESS
Information obtained from charts, patients family.    75M East Timorese speaking with T2DM, HTN, CAD s/p 12-14 stents placed in 1990's, and BABAR x 5 placed in Sept 2017, CHF with EF 20%, AF (not a/c 2/2 GIB), CVA , ESRD on HD (MWF) COPD, dementia, brought in from [place] for SOB. Patient A+Ox1 at baseline, but now has increasing confusion per family and no longer recognizes them. Patient does not communicate verbally and further ROS is unable to be obtained. Patient was dialyzed yesterday.     In ED pt hypoxic on NRB, upgraded to BIPAP and then AVAPS. CXR whiteout of the left lung. Concern for fluid overload given SOB, CXR findings, hypokalemia, Acidosis (7.18/82/88). Code Dialysis called and nephrology aware, patient admitted for ICU for HD and further respiratory management.

## 2020-11-03 NOTE — CONSULT NOTE ADULT - ATTENDING COMMENTS
Patient seen and evaluated this morning 11/4 with fellow Dr. Akers and discussed with MICU attending Earl.  While patient remains dyspneic after HD the primary concern is for mucus plugging rather than pulmonary edema.  Will plan on dialysis likely tomorrow.

## 2020-11-03 NOTE — ED ADULT NURSE NOTE - OBJECTIVE STATEMENT
Pt to bed 7 as a notification. Pt in resp distress. Non rebreather in place. o2 saturation on 90's %. Pt daughter at bedside. DNR confirmed. Pt with pressure ulcers to b/l heal and sacrum. Pt with right arm fistula and right arm contracture. Sono guided IV line place to left AC. Bloods drawn and sent to lab. Pt placed on BI Pap for respiratory distress. Will continue to monitor.

## 2020-11-03 NOTE — ED ADULT NURSE NOTE - CHIEF COMPLAINT QUOTE
Notification brought from [place] for respiratory distress. SpO2 84 on RA, placed on non-rebreather. Pt. brought straight to room.

## 2020-11-03 NOTE — CONSULT NOTE ADULT - PROBLEM SELECTOR RECOMMENDATION 9
Pt. with ESRD on HD three times a week (MWF). Last HD was on 11/2 via AVF @ [facility]. pt with acute resp failure, DNI - requiring BiPAP. Labs reviewed. Will arrange for urgent HD today, UF goal of 1.5L -  Monitor labs. Adjust medications as per HD.

## 2020-11-04 NOTE — PROGRESS NOTE ADULT - ATTENDING COMMENTS
75 M with CAD, CHF, Afib, ESRD on HD, COPD here with acute hypoxemic and hypercapnic respiratory failure due to aspiration pna (had mucous plugging of left lung).  Initially thought to have fluid overload requiring urgent HD, but I suspect this is more aspiration related.  Cxr improved, but this AM, he had another episode of hypoxemia requiring aggressive NT suctioning with suctioning out of thick, purulent secretions.  Patient is DNR/DNI and this was confirmed again.  On pressors, will f/u GOC with family, who will visit today.

## 2020-11-04 NOTE — CHART NOTE - NSCHARTNOTEFT_GEN_A_CORE
Spoke with daughter who is health care proxy. Confirmed that pt is DNR/DNI. After discussion regarding patient's poor prognosis, daughter decided to cap pressors. Ok with pushes of opioids in case of discomfort

## 2020-11-04 NOTE — PROGRESS NOTE ADULT - ASSESSMENT
75M Palauan speaking with T2DM, HTN, CAD s/p 12-14 stents placed in 1990's, and BABAR x 5 placed in Sept 2017, CHF with EF 20%, AF (not a/c 2/2 GIB), CVA , ESRD on HD (MWF) COPD, dementia, brought in from [place] for SOB. Found to have whiteout of left lung, hypokalemia, acidosis. Concern for fluid overload possibly with underlying COPD exacerbation/atelectasis    Neuro  - baseline dementia, more confused, nonverbal    Cards  - on ASA/Plavix will hold while NPO and restart after      Pulm  - hypercapnic respiratory failure, whiteout of left lung, fluid overload  - Emergent HD then reassess respiratory status  - On AVAPs, trend blood gas    GI  - NPO for mental status and mask for AVAPs, will reassess after    Renal  - ESRD, dialyzed prior to admission, code dialysis for fluid overload  - Emergent 11/3 dialysis net neg -2L  - Hypokalemic will reassess BMP after HD, still hypokalemic to 3, given 3 K riders will check at next BMP  - VBG on admission 7.18/82/88, resp acidosis, some correction as bicarb 26    Endo  - HX T2DM, glucose normal, will monitor    ID  - whiteout of left lung on CXR, afebrile though has a leukocytosis, now with bandemia, started on Vanc/Zosyn overnight    Ethics  - DNR/DNI, family ok with pressors if needed

## 2020-11-04 NOTE — DISCHARGE NOTE FOR THE EXPIRED PATIENT - HOSPITAL COURSE
75M Egyptian speaking with T2DM, HTN, CAD s/p 12-14 stents placed in 1990's, and BABAR x 5 placed in Sept 2017, CHF with EF 20%, AF (not a/c 2/2 GIB), CVA , ESRD on HD (MWF) COPD, dementia, brought in from [place] for SOB. Found to have whiteout of left lung, hypokalemia, acidosis, likely acute hypoxic respiratory failure from recurrent aspiration and mucus plugging. Patient was placed on NIPPV, yet respiratory status continued to worsen with encephalopathy. GOC discussed with family, confirmed DNR/DNI. Acidemia worsened with both metabolic and respiratory acidosis. Patient's gradually anthony downed and became asystole with family member at bedside. Patient pronounced at 5:45PM.

## 2020-11-04 NOTE — CHART NOTE - NSCHARTNOTEFT_GEN_A_CORE
Called by bedside by nurse for asystole on cardiac monitor     On physical exam, no spontaneous movements were present. Patient did not respond to verbal or physical stimuli. Pupils were mid-dilated and fixed, without light reflex and corneal reflex. No breath sounds were appreciated over either lung field. No carotid pulses were palpable. No heart sounds were auscultated.   Patient pronounced dead at 5:35PM. Attending notified.  Family was notified. Family refused autopsy. Called by bedside by nurse for asystole on cardiac monitor     On physical exam, no spontaneous movements were present. Patient did not respond to verbal or physical stimuli. Pupils were mid-dilated and fixed, without light reflex and corneal reflex. No breath sounds were appreciated over either lung field. No carotid pulses were palpable. No heart sounds were auscultated.   Patient pronounced dead at 5:45PM. Attending notified.  Family was notified. Family refused autopsy.

## 2020-11-04 NOTE — CHART NOTE - NSCHARTNOTEFT_GEN_A_CORE
Dr Godoy: At approximately 11:15 the patient had an episode of acute hypoxia and decreased mental status, began to become bradycardic. Upon evaluation he was obtunded, his blood sugar was normal, and his AVAPs settings were increased to 100% FiO2. With deep nasopharyngeal suction we were able to remove a large amount of mucus, which seems to have resolved the acute issue and the patient is back to his baseline.     I contacted the daughter at this time and explained the gravity of the situation, and stressed that although this episode has resolved he is still acutely ill and could decompensate further. She understands and will come to visit later this afternoon.

## 2020-11-04 NOTE — DIETITIAN INITIAL EVALUATION ADULT. - PERTINENT LABORATORY DATA
11/4/2020 Sodium 134 low, K 2.7 low, BUN 29 high, Cr. 1.7 high, Glu 413 high ???, Ca 7.6 low, Phos. 1.2 low but noted this morning labs were different electrolytes normal except K 3.0

## 2020-11-04 NOTE — DIETITIAN INITIAL EVALUATION ADULT. - ORAL INTAKE PTA/DIET HISTORY
Pt. on BiPAP, unable to speak, per EMR pt. from SNF, with dementia , and multiple co morbidities , pt. DNR / DNI , pressure ulcer - unstageable - sacrum and B/L heel . Pt. maintained NPO .

## 2020-11-04 NOTE — PROGRESS NOTE ADULT - SUBJECTIVE AND OBJECTIVE BOX
OVERNIGHT EVENTS / SUBJECTIVE: Patient seen and examined at bedside. Admitted yesterday evening and had HD which removed 2L. CXR notable for L lung whiteout, labs notable for bandemia and patient started on vanc/zosyn    OBJECTIVE:    VITAL SIGNS:  ICU Vital Signs Last 24 Hrs  T(C): 36.3 (04 Nov 2020 04:00), Max: 36.8 (03 Nov 2020 17:18)  T(F): 97.4 (04 Nov 2020 04:00), Max: 98.3 (03 Nov 2020 17:18)  HR: 81 (04 Nov 2020 06:28) (65 - 98)  BP: 110/55 (04 Nov 2020 06:00) (106/51 - 137/72)  BP(mean): 73 (04 Nov 2020 06:00) (67 - 91)  ABP: --  ABP(mean): --  RR: 29 (04 Nov 2020 06:00) (20 - 30)  SpO2: 96% (04 Nov 2020 06:28) (94% - 100%)    Mode: NIV (Noninvasive Ventilation), RR (machine): 14, TV (machine): 400, FiO2: 50, PEEP: 5, ITime: 0.9, P-High: 24, P-Low: 10, PIP: 14    11-03 @ 07:01  -  11-04 @ 07:00  --------------------------------------------------------  IN: 700 mL / OUT: 2000 mL / NET: -1300 mL      CAPILLARY BLOOD GLUCOSE      POCT Blood Glucose.: 48 mg/dL (04 Nov 2020 06:41)      PHYSICAL EXAM:    Vitals: I have reviewed the patients vital signs  General: ill appearing, responsive to touch  HEENT: atraumatic, normocephalic, airway patent,   Neck: no tracheal deviation  Chest/Lungs: no trauma, symmetric chest rise, diffuse rhonchi worse on L  Heart: Regular rate, skin pale, 2+ pulses  Abdomen: Soft and nontender throughout, mildly distended  Neuro: non verbal, somnolent, responsive to touch  MSK: moving all extremities  Skin: no active bleeding    MEDICATIONS:  MEDICATIONS  (STANDING):  albuterol/ipratropium for Nebulization 3 milliLiter(s) Nebulizer every 4 hours  aMIOdarone    Tablet 200 milliGRAM(s) Oral daily  aspirin  chewable 81 milliGRAM(s) Oral daily  atorvastatin 40 milliGRAM(s) Oral at bedtime  busPIRone 5 milliGRAM(s) Oral <User Schedule>  chlorhexidine 4% Liquid 1 Application(s) Topical <User Schedule>  clopidogrel Tablet 75 milliGRAM(s) Oral daily  heparin   Injectable 5000 Unit(s) SubCutaneous every 8 hours  metoprolol tartrate 25 milliGRAM(s) Oral two times a day  pantoprazole  Injectable 40 milliGRAM(s) IV Push daily  piperacillin/tazobactam IVPB.. 3.375 Gram(s) IV Intermittent every 8 hours  sevelamer carbonate 800 milliGRAM(s) Oral three times a day  tamsulosin 0.4 milliGRAM(s) Oral at bedtime  tiotropium 18 MICROgram(s) Capsule 1 Capsule(s) Inhalation daily  vancomycin  IVPB 1000 milliGRAM(s) IV Intermittent once    MEDICATIONS  (PRN):      ALLERGIES:  Allergies    No Known Allergies    Intolerances        LABS:                        12.2   6.01  )-----------( 184      ( 04 Nov 2020 03:02 )             47.6     Hemoglobin: 12.2 g/dL (11-04 @ 03:02)  Hemoglobin: 11.3 g/dL (11-03 @ 16:38)    CBC Full  -  ( 04 Nov 2020 03:02 )  WBC Count : 6.01 K/uL  RBC Count : 4.99 M/uL  Hemoglobin : 12.2 g/dL  Hematocrit : 47.6 %  Platelet Count - Automated : 184 K/uL  Mean Cell Volume : 95.4 fL  Mean Cell Hemoglobin : 24.4 pg  Mean Cell Hemoglobin Concentration : 25.6 %  Auto Neutrophil # : 5.38 K/uL  Auto Lymphocyte # : 0.56 K/uL  Auto Monocyte # : 0.03 K/uL  Auto Eosinophil # : 0.00 K/uL  Auto Basophil # : 0.01 K/uL  Auto Neutrophil % : 89.5 %  Auto Lymphocyte % : 9.3 %  Auto Monocyte % : 0.5 %  Auto Eosinophil % : 0.0 %  Auto Basophil % : 0.2 %    11-04    138  |  99  |  28<H>  ----------------------------<  72  3.0<L>   |  25  |  1.63<H>    Ca    8.6      04 Nov 2020 03:02  Phos  3.1     11-03  Mg     1.8     11-04    TPro  5.9<L>  /  Alb  2.6<L>  /  TBili  0.4  /  DBili  x   /  AST  17  /  ALT  19  /  AlkPhos  118  11-04    Creatinine Trend: 1.63<--, 2.81<--  LIVER FUNCTIONS - ( 04 Nov 2020 03:02 )  Alb: 2.6 g/dL / Pro: 5.9 g/dL / ALK PHOS: 118 u/L / ALT: 19 u/L / AST: 17 u/L / GGT: x           PT/INR - ( 03 Nov 2020 16:38 )   PT: 13.9 SEC;   INR: 1.23          PTT - ( 03 Nov 2020 16:38 )  PTT:41.3 SEC    16:38 - VBG - pH: 7.18  | pCO2: 82    | pO2: 88    | Lactate: 0.9      EKG:   MICROBIOLOGY:    IMAGING:      Labs, imaging, EKG personally reviewed    RADIOLOGY & ADDITIONAL TESTS: Reviewed.

## 2020-11-05 LAB
GRAM STN FLD: SIGNIFICANT CHANGE UP
METHOD TYPE: SIGNIFICANT CHANGE UP
P AERUGINOSA DNA BLD POS NAA+NON-PROBE: SIGNIFICANT CHANGE UP
SPECIMEN SOURCE: SIGNIFICANT CHANGE UP

## 2020-11-06 LAB
-  AMIKACIN: SIGNIFICANT CHANGE UP
-  AZTREONAM: SIGNIFICANT CHANGE UP
-  CEFEPIME: SIGNIFICANT CHANGE UP
-  CEFTAZIDIME: SIGNIFICANT CHANGE UP
-  CIPROFLOXACIN: SIGNIFICANT CHANGE UP
-  COAGULASE NEGATIVE STAPHYLOCOCCUS: SIGNIFICANT CHANGE UP
-  GENTAMICIN: SIGNIFICANT CHANGE UP
-  IMIPENEM: SIGNIFICANT CHANGE UP
-  LEVOFLOXACIN: SIGNIFICANT CHANGE UP
-  MEROPENEM: SIGNIFICANT CHANGE UP
-  PIPERACILLIN/TAZOBACTAM: SIGNIFICANT CHANGE UP
-  TOBRAMYCIN: SIGNIFICANT CHANGE UP
CULTURE RESULTS: SIGNIFICANT CHANGE UP
GRAM STN FLD: SIGNIFICANT CHANGE UP
METHOD TYPE: SIGNIFICANT CHANGE UP
METHOD TYPE: SIGNIFICANT CHANGE UP
ORGANISM # SPEC MICROSCOPIC CNT: SIGNIFICANT CHANGE UP
P AERUGINOSA DNA BLD POS NAA+NON-PROBE: SIGNIFICANT CHANGE UP
SPECIMEN SOURCE: SIGNIFICANT CHANGE UP

## 2020-11-07 LAB — CULTURE RESULTS: SIGNIFICANT CHANGE UP

## 2020-11-16 NOTE — PROVIDER CONTACT NOTE (MEDICATION) - RECOMMENDATIONS
11/16/20    Patient ID: Fanny is a 89 year old female.    Chief Complaint   Patient presents with   • Follow-up     B12 needed           HPI    Chart reviewed    Fanny is a nice patient who is here today for follow-up stating that she is doing good and offers no complaints or problems, she will be getting B12 today, she have significant whitecoat component to her blood pressure which is always high in the office, however she usually bring her blood pressure from home which has been good.  She is up-to-date on her tetanus and pneumonia and flu shot, advised patient to get Shingrix vaccination from her local pharmacy.  Reviewed her last blood work, her thyroid is stable, she offers no complaints today      .  ALLERGIES:   Allergen Reactions   • Actonel Other (See Comments)     Stomach Ache          • Amlodipine Other (See Comments)     tingling   • Calcitonin ([place]) Other (See Comments)     epistaxis     • Losartan Potassium-Hctz HEADACHES   • Raloxifene Other (See Comments)     leg pain           Current Outpatient Medications   Medication Sig Dispense Refill   • atenolol (TENORMIN) 50 MG tablet Take 1 tablet by mouth 2 times daily. 180 tablet 1   • Benicar HCT 40-25 MG per tablet Take 1 tablet by mouth daily. 90 tablet 1   • levothyroxine 75 MCG tablet Take 1 tablet on empty stomach Monday thru Friday, then take 2 tablets on Saturday and Sunday 120 tablet 1   • pravastatin (PRAVACHOL) 40 MG tablet Take 1 tablet by mouth daily. 90 tablet 1   • Multiple Vitamin (MULTI VITAMIN DAILY) Tab Take 1 tablet by mouth daily.     • ferrous sulfate 325 (65 FE) MG tablet Take 1 tablet by mouth daily.     • DENOSumab (PROLIA) 60 MG/ML Solution      • cyanocobalamin 1000 MCG/ML injection Inject 1,000 mcg as directed every 30 days.     • cetirizine (ZYRTEC ALLERGY) 10 MG tablet      • calcium carbonate-vitamin D (CALTRATE+D) 600-400 MG-UNIT per tablet Take 2 tablets by mouth daily.     • acetaminophen (TYLENOL) 500 MG tablet  Take 1 tablet by mouth daily as needed.     • vitamin D, Cholecalciferol, 1000 units capsule Take 1 capsule by mouth daily. 60 capsule      Current Facility-Administered Medications   Medication Dose Route Frequency Provider Last Rate Last Dose   • cyanocobalamin injection 1,000 mcg  1,000 mcg Intramuscular Once Rhea Zheng MD             Patient Active Problem List   Diagnosis   • Anemia of chronic disease   • Arthritis of knee   • Essential hypertension   • Goiter   • Hypercholesterolemia   • Low sodium levels   • Mass of left submandibular region   • Osteoarthritis of multiple joints   • Osteoporosis   • Other allergic rhinitis   • Primary hypothyroidism   • TIA (transient ischemic attack)   • Vitamin B 12 deficiency   • White coat syndrome with hypertension          Past Surgical History:   Procedure Laterality Date   • Bunionectomy     •  section, low transverse      2   • Colonoscopy      2015, Dr. Salmon, small hyperplastic polyp   • Esophagogastroduodenoscopy      2015, Dr. Vargas   • Fracture surgery      ORIF left hip fracture 2011   • Total knee replacement      Left, 2014, Bere         Family History   Problem Relation Age of Onset   • Thyroid Mother    • Alcohol Abuse Father    • Cancer, Throat Brother         Smoker and drinker         Social History     Tobacco Use   • Smoking status: Former Smoker   • Smokeless tobacco: Never Used   • Tobacco comment: On and off more than 50 years up to half pack per day, quit around , smoking years count to about 20 years   Substance Use Topics   • Alcohol use: Yes     Comment: Few times a months   • Drug use: No        Sexually Active: Not Asked           Comment: , 2 children         Immunization History   Administered Date(s) Administered   • Influenza, high dose quadrivalent, preservative-free 10/07/2020   • Influenza, high dose seasonal, preservative-free 2015, 10/09/2017, 10/22/2018,  09/16/2019   • Influenza, injectable, quadrivalent 11/30/2015, 11/21/2016   • Influenza, injectable, quadrivalent, preservative-free 11/21/2016   • Pneumococcal Conjugate 13 valent 02/29/2016   • Pneumococcal polysaccharide, adult, 23 valent 12/15/2014   • TD Adult, Adsorbed 07/31/2015, 08/24/2015   • Tdap 08/24/2015        Review of Systems   Constitutional: Negative for fatigue.   HENT: Negative for congestion.    Respiratory: Negative for cough, shortness of breath and wheezing.    Cardiovascular: Negative for chest pain and palpitations.        No shortness of breath   Gastrointestinal: Negative for abdominal pain, nausea and vomiting.   Skin: Negative for rash.   Neurological: Negative for seizures and weakness.        No acute focal symptoms   Hematological: Does not bruise/bleed easily.   Psychiatric/Behavioral: Negative for confusion. The patient is not nervous/anxious.        Physical Exam:  Visit Vitals  /75 Comment: average home readings   Pulse 74   Temp 97.5 °F (36.4 °C)   Resp 18   Ht 5' 1\" (1.549 m)   Wt 62.6 kg (138 lb 0.1 oz)   SpO2 98%   BMI 26.08 kg/m²       Physical Exam   Constitutional: No distress.   HENT:   Head: Normocephalic and atraumatic.   Eyes: Conjunctivae are normal.   Neck: Neck supple.   Cardiovascular: Normal rate, regular rhythm and normal heart sounds. Exam reveals no gallop.   Pulmonary/Chest: Effort normal and breath sounds normal. She has no wheezes.   Abdominal: Soft. Bowel sounds are normal. She exhibits no distension. There is no abdominal tenderness.   Musculoskeletal:      Comments: Using a cane for ambulation   Neurological: She is alert. Coordination normal.   Skin: Skin is warm and dry. She is not diaphoretic.   Psychiatric: Her behavior is normal.       Last Results:    No visits with results within 1 Month(s) from this visit.   Latest known visit with results is:   Lab Services on 08/24/2020   Component Date Value Ref Range Status   • TSH 08/24/2020 2.088   0.350 - 5.000 mcUnits/mL Final   • WBC 08/24/2020 6.9  4.2 - 11.0 K/mcL Final   • RBC 08/24/2020 3.75* 4.00 - 5.20 mil/mcL Final   • HGB 08/24/2020 10.5* 12.0 - 15.5 g/dL Final   • HCT 08/24/2020 32.0* 36.0 - 46.5 % Final   • MCV 08/24/2020 85.3  78.0 - 100.0 fl Final   • MCH 08/24/2020 28.0  26.0 - 34.0 pg Final   • MCHC 08/24/2020 32.8  32.0 - 36.5 g/dL Final   • RDW-CV 08/24/2020 14.2  11.0 - 15.0 % Final   • PLT 08/24/2020 284  140 - 450 K/mcL Final   • NRBC 08/24/2020 0  0 /100 WBC Final   • DIFF TYPE 08/24/2020 AUTOMATED DIFFERENTIAL   Final   • Neutrophil 08/24/2020 66  % Final   • LYMPH 08/24/2020 19  % Final   • MONO 08/24/2020 10  % Final   • EOSIN 08/24/2020 3  % Final   • BASO 08/24/2020 1  % Final   • Percent Immature Granuloctyes 08/24/2020 1  % Final   • Absolute Neutrophil 08/24/2020 4.6  1.8 - 7.7 K/mcL Final   • Absolute Lymph 08/24/2020 1.3  1.0 - 4.0 K/mcL Final   • Absolute Mono 08/24/2020 0.7  0.3 - 0.9 K/mcL Final   • Absolute Eos 08/24/2020 0.2  0.1 - 0.5 K/mcL Final   • Absolute Baso 08/24/2020 0.1  0.0 - 0.3 K/mcL Final   • Absolute Immature Granulocytes 08/24/2020 0.0  0 - 0.2 K/mcl Final   • Fasting Status 08/24/2020 12  hrs Final   • Sodium 08/24/2020 131* 135 - 145 mmol/L Final   • Potassium 08/24/2020 4.0  3.4 - 5.1 mmol/L Final   • Chloride 08/24/2020 96* 98 - 107 mmol/L Final   • Carbon Dioxide 08/24/2020 27  21 - 32 mmol/L Final   • Anion Gap 08/24/2020 12  10 - 20 mmol/L Final   • Glucose 08/24/2020 93  65 - 99 mg/dL Final   • BUN 08/24/2020 7  6 - 20 mg/dL Final   • Creatinine 08/24/2020 0.61  0.51 - 0.95 mg/dL Final   • GFR Estimate,  08/24/2020 >90   Final   • GFR Estimate, Non  08/24/2020 80   Final   • BUN/Creatinine Ratio 08/24/2020 11  7 - 25 Final   • CALCIUM 08/24/2020 8.8  8.4 - 10.2 mg/dL Final   • TOTAL BILIRUBIN 08/24/2020 0.7  0.2 - 1.0 mg/dL Final   • AST/SGOT 08/24/2020 23  <38 Units/L Final   • ALT/SGPT 08/24/2020 26  <64 Units/L  Final   • ALK PHOSPHATASE 08/24/2020 79  45 - 117 Units/L Final   • TOTAL PROTEIN 08/24/2020 6.9  6.4 - 8.2 g/dL Final   • Albumin 08/24/2020 3.4* 3.6 - 5.1 g/dL Final   • GLOBULIN 08/24/2020 3.5  2.0 - 4.0 g/dL Final   • A/G Ratio, Serum 08/24/2020 1.0  1.0 - 2.4 Final   • FASTING STATUS 08/24/2020 12  hrs Final   • CHOLESTEROL 08/24/2020 167  <200 mg/dL Final   • CALCULATED LDL 08/24/2020 85  <130 mg/dL Final   • HDL 08/24/2020 70  >49 mg/dL Final   • TRIGLYCERIDE 08/24/2020 60  <150 mg/dL Final   • CALCULATED NON HDL 08/24/2020 97  mg/dL Final   • CHOL/HDL 08/24/2020 2.4  <4.5 Final             ASSESSMENT/PLAN:  Fanny was seen today for follow-up.    Diagnoses and all orders for this visit:    Essential hypertension    Vitamin B 12 deficiency  -     cyanocobalamin injection 1,000 mcg    Primary hypothyroidism    Osteoarthritis of multiple joints, unspecified osteoarthritis type      Patient is stable medically, continue current medication for high blood pressure and thyroid, B12 injection today  Tylenol as needed for arthritis.  Shingrix vaccination from her local pharmacy.  See me in 3 months.  Medical compliance with plan discussed and risks of non-compliance reviewed.    Patient education completed on disease process, etiology & prognosis.    Patient expresses understanding of the plan.    Proper usage and side effects of medications reviewed & discussed.   After visit summary printout explained and given to patient        Rhea Zheng MD   Dr. Land contacted & aware. Dialysis ordered.  Hold Dextrose, Insulin Regular 10 units, Kayexalate & Albuterol. Will continue to monitor.

## 2021-02-17 NOTE — ED ADULT TRIAGE NOTE - AS TEMP SITE
Render Risk Assessment In Note?: no Additional Notes: Present for years with no changes. Detail Level: Simple Additional Notes: -pt noted in HPI oral

## 2021-02-22 NOTE — DIETITIAN INITIAL EVALUATION ADULT. - PROBLEM SELECTOR PLAN 1
Medication list has been updated  Will continue to monitor for now.    Thanks  LYNDA  
Patient is calling in regards to her blood test results. Patient would like a call back.    Patient Name: Sheila L Anderson  Caller Name: Sheila  Callback Number: [phone]  Best Availability: Anytime  Can A Detailed Message Be left? yes  Did you confirm the message with the caller?: yes    Thank you,  Amada Garcia V    
Pt informed of all lab results.  She wanted [facility] to know that her hiccups have improved, have been \"less and less\" since starting medication and has not had any today. Was she given script for gabapentin? I do not see it on her med list.        ----- Message from Sheila Craft, DO sent at 2/17/2021  7:52 AM CST -----  Normal kidney function  Liver function is stable   Thyroid is normal - not source of hiccups  Pt has a mild anemia that is likely due to chronic disease  Nothing in labs indicating why she has hiccups.  Will see how she does with gabapentin  
Initially presented with worsening SOB. Likely 2/2 CHF exacerbation vs fluid overload from ESRD vs PNA; now improved following BiPAP and Lasix 40mg IVP x1. Vitals wnl  - f/u TTE  - daily weights  - strict I&Os  - monitor on telemetry  - diabetic-renal-DASH diet w/ fluid restriction  - goal net negative fluid balance

## 2021-05-29 NOTE — PATIENT PROFILE ADULT. - HEALTHCARE QUESTIONS, PROFILE
Medication Question or Clarification  Who is calling: Pharmacy: [facility] #[id number]  What medication are you calling about? (include dose and sig) Losartan/HCTZ 100/25 mg, one daily  Who prescribed the medication?: Dr. Cortes  What is your question/concern?: Medication is on back order.  Please rewrite for losartan and HCTZ separately.  Pharmacy: [facility] #[id number]  Okay to leave a detailed message?: Yes  Site CMT - Please call the pharmacy to obtain any additional needed information.   none

## 2021-06-08 NOTE — PROGRESS NOTE ADULT - PROBLEM SELECTOR PLAN 3
Medication (1) And Associated J-Code Units: Triamcinolone acetonide, 10mg though pt has depressed t's on ekg trop is dramatically lower than last troponin  will monitor on telemetry  - cont asa/plavix

## 2021-06-11 NOTE — ED ADULT TRIAGE NOTE - INTERPRETER NAME
Patient of Dr. Zahida Bray with Breast Cancer. Patient was to start Taxol and Bennye Bucco today, however per chart review appointment was moved out 2 weeks. The past is 12 days s/p removal of right tissue expander. Surgery was 5/30/21. Port placed on 5/27, okay to wait until 6/23 as port flush would be scheduled every 6-8 weeks. Discussed with Dr. Amparo Rizo. We would like drain removed as risk for infection with neutropenia/chemo is expected. Call placed to patient and discussed above. She verbalized understanding and had no further questions. Anisa

## 2021-10-06 PROBLEM — I10 ESSENTIAL HYPERTENSION: Status: ACTIVE | Noted: 2018-10-24

## 2021-12-22 NOTE — CONSULT NOTE ADULT - REASON FOR ADMISSION
From: Theresa Knight  To: Abi Gale MD  Sent: 12/22/2021 2:04 PM EST  Subject: Metformin    Hello, I was trying to get my metformin refilled but they said at the pharmacy in [place] that the doctor canceled it. Are you able to call in the the 90 day supply please if you still want me to take it. Please call it into [facility] in [place] please . CHF exacerbation

## 2022-03-12 NOTE — PROGRESS NOTE ADULT - PROBLEM/PLAN-8
Patient Education     Back Pain (Acute or Chronic)     Back pain is one of the most common problems. The good news is that most people feel better in 1 to 2 weeks, and most of the rest in 1 to 2 months. Most people can remain active.  People who have pain describe it differently--not everyone is the same.  · The pain can be sharp, stabbing, shooting, aching, cramping or burning.  · Movement, standing, bending, lifting, sitting, or walking may worsen pain.  · It can be limited to one spot or area, or it can be more generalized.  · It can spread upwards, to the front, or go down your arms or legs (sciatica).  · It can cause muscle spasm.  Most of the time, mechanical problems with the muscles or spine cause the pain. Mechanical problems are usually caused by an injury to the muscles or ligaments. Illness can cause back pain, but it's usually not caused by a serious illness. Mechanical problems include:   · Physical activity such as sports, exercise, work, or normal activity  · Overexertion, lifting, pushing, pulling incorrectly or too aggressively  · Sudden twisting, bending, or stretching from an accident, or accidental movement  · Poor posture  · Stretching or moving wrong, without noticing pain at the time  · Poor coordination, lack of regular exercise (check with your doctor about this)  · Spinal disc disease or arthritis  · Stress  Pain can also be related to pregnancy, or illness like appendicitis, bladder or kidney infections, pelvic infections, and many other things.  Acute back pain usually gets better in 1 to 2 weeks. Back pain related to disk disease, arthritis in the spinal joints, or narrowing of the spinal canal (spinal stenosis) can become chronic and last for months or years.  Unless you had a physical injury such as a car accident or fall, X-rays are usually not needed for the first assessment of back pain. If pain continues and does not respond to medical treatment, you may need X-rays and other 
tests.  Home care  Try this home care advice:  · When in bed, try to find a position of comfort. A firm mattress is best. Try lying flat on your back with pillows under your knees. You can also try lying on your side with your knees bent up toward your chest and a pillow between your knees.  · At first, don't try to stretch out the sore spots. If there is a strain, it's not like the good soreness you get after exercising without an injury. In this case, stretching may make it worse.  · Don't sit for long periods, as in a long car ride or during other travel. This puts more stress on the lower back than standing or walking.  · During the first 24 to 72 hours after an acute injury or flare up of chronic back pain, apply an ice pack to the painful area for 20 minutes and then remove it for 20 minutes. Do this over a period of 60 to 90 minutes or several times a day. This will reduce swelling and pain. Wrap the ice pack in a thin towel or plastic to protect your skin.  · You can start with ice, then switch to heat. Heat (hot shower, hot bath, or heating pad) reduces pain and works well for muscle spasms. Heat can be applied to the painful area for 20 minutes then remove it for 20 minutes. Do this over a period of 60 to 90 minutes or several times a day. Don't sleep on a heating pad. It can lead to skin burns or tissue damage.  · You can alternate ice and heat therapy. Talk with your doctor about the best treatment for your back pain.  · Therapeutic massage can help relax the back muscles without stretching them.  · Be aware of safe lifting methods and don't lift anything without stretching first.  Medicines  Talk to your doctor before using medicine, especially if you have other medical problems or are taking other medicines.  · You may use over-the-counter medicine as directed on the bottle to control pain, unless another pain medicine was prescribed. If you have chronic conditions like diabetes, liver or kidney 
disease, stomach ulcers, or gastrointestinal bleeding, or are taking blood thinners, talk to your doctor before taking any medicine.  · Be careful if you are given a prescription medicines, narcotics, or medicine for muscle spasms. They can cause drowsiness, affect your coordination, reflexes, and judgement. Don't drive or operate heavy machinery.  Follow-up care  Follow up with your healthcare provider, or as advised.   If X-rays were taken, you will be told of any new findings that may affect your care  Call 911  Call 911 if any of the following occur:  · Trouble breathing  · Confusion  · Very drowsy or trouble awakening  · Fainting or loss of consciousness  · Rapid or very slow heart rate  · Loss of bowel or bladder control  When to seek medical advice  Call your healthcare provider right away if any of these occur:   · Pain becomes worse or spreads to your legs  · Weakness or numbness in one or both legs  · Numbness in the groin or genital area  [employer] last reviewed this educational content on 10/1/2019  © 1221-9004 [employer]. All rights reserved. This information is not intended as a substitute for professional medical care. Always follow your healthcare professional's instructions.           
DISPLAY PLAN FREE TEXT
No

## 2022-05-16 NOTE — DISCHARGE NOTE ADULT - CARE PLAN
well controlled, continue current medications, medication adherence emphasized Principal Discharge DX:	Acute pulmonary edema  Goal:	Resolution of symptoms  Assessment and plan of treatment:	Follow up with your PMD within 1 week  Call for appointment  Secondary Diagnosis:	Acute on chronic systolic (congestive) heart failure  Assessment and plan of treatment:	Follow with PMD. Monitor your weight and record results  Take medications as prescribed  Secondary Diagnosis:	Atrial fibrillation, unspecified type  Assessment and plan of treatment:	Follow with PMD  Secondary Diagnosis:	Bacteremia  Assessment and plan of treatment:	Follow with PMD  Secondary Diagnosis:	Bipolar affective disorder, remission status unspecified  Assessment and plan of treatment:	Follow with PMD  Secondary Diagnosis:	Anemia  Assessment and plan of treatment:	Follow with PMD  Secondary Diagnosis:	CKD (chronic kidney disease)  Assessment and plan of treatment:	Follow with PMD

## 2022-06-28 NOTE — PROGRESS NOTE ADULT - PROBLEM SELECTOR PLAN 5
Patient initially presented with NSTEMI, s/p Impella-assisted Cath with BABAR x5 to LAD/RCA  - c/w DAPT, statin, and beta-blocker  -  per renal, no plan to start ACE/ARB  - cards following [FreeTextEntry8] : This is a 52 y/o female with PMHx significant for anemia, GERD, Obesity, +PPD, Vitamin D insufficiency, Varicose veins of LE, s/p uterine cystectomy on 12/5/20, recent  Covid infection 5/22 s/p Ab infusion at [facility] presenting to the office c/o continued sore throat and cough, worse at night time, cough is now dry. Pt states that she has tried OTC medication with no relief.

## 2022-07-21 NOTE — PROGRESS NOTE ADULT - SUBJECTIVE AND OBJECTIVE BOX
Patient is a 72y old  Male who presents with a chief complaint of s/p cardiac cath (24 Oct 2017 15:51)    Being followed by ID for bacteremia    Interval history:  No acute events      ROS:  No cough,SOB,CP  No N/V/D./abd pain  No other complaints      Antimicrobials:Vanco post HD        Vital Signs Last 24 Hrs  T(C): 36.7 (10-27-17 @ 04:30), Max: 36.8 (10-26-17 @ 21:35)  T(F): 98 (10-27-17 @ 04:30), Max: 98.3 (10-26-17 @ 21:35)  HR: 86 (10-27-17 @ 04:30) (85 - 94)  BP: 113/73 (10-27-17 @ 04:30) (100/63 - 115/74)  BP(mean): --  RR: 18 (10-27-17 @ 04:30) (18 - 18)  SpO2: 96% (10-27-17 @ 04:30) (96% - 98%)    Physical Exam:    Constitutional well preserved,comfortable,pleasant    HEENT PERRLA EOMI,No pallor or icterus    No oral exudate or erythema    Neck supple no JVD or LN    R SC HD catheter no erythema or tenderness    Chest Good AE,CTA    CVS RRR S1 S2 WNl No murmur or rub or gallop    Abd soft BS normal No tenderness no masses    Ext No cyanosis clubbing or edema    IV site no erythema tenderness or discharge    Joints no swelling or LOM    CNS AAO X 3 no focal    Lab Data:                          9.8    7.1   )-----------( 226      ( 26 Oct 2017 06:40 )             29.3       10-26    139  |  97  |  75<H>  ----------------------------<  196<H>  3.6   |  22  |  7.26<H>    Ca    9.2      26 Oct 2017 06:40  Phos  4.9     10-26  Mg     2.2     10-26          Culture - Blood (collected 24 Oct 2017 17:43)  Source: .Blood Blood-Peripheral  Preliminary Report (25 Oct 2017 18:01):    No growth to date.    Culture - Blood (collected 24 Oct 2017 17:42)  Source: .Blood Blood  Gram Stain (25 Oct 2017 14:04):    Growth in anaerobic bottle: Gram Positive Cocci in Clusters    Growth in aerobic bottle: Gram Positive Cocci in Clusters  Final Report (26 Oct 2017 10:44):    Growth in aerobic and anaerobic bottles: Coag Negative Staphylococcus    Single set isolate, possible contaminant. Contact    Microbiology if susceptibility testing clinically    indicated.            Vancomycin Level, Random: 27.9 ug/mL (10-27-17 @ 07:01)  Vancomycin Level, Random: 21.8 ug/mL (10-26-17 @ 06:40) asymptomatic

## 2022-07-27 NOTE — PROGRESS NOTE ADULT - SUBJECTIVE AND OBJECTIVE BOX
Cardiology   Halie Jaimes 70 y o  male MRN: [id number]  Unit/Bed#: S -01 Encounter: [id number]      Reason for Consult / Principal Problem:  Elevated troponin  Physician Requesting Consult:  Kirsten Epperson MD    Outpatient Cardiologist:  Cardiologist will be Dr Zak Del Castillo but has seen Vineet Marquez recently as an outpatient  Assessment  1  Elevated troponin - suspect to be a non myocardial ischemic injury - likely secondary to # 2  -Low suspicion for primary ACS  -No complaints of chest pain  -HS troponin levels 77 -- 89 -- 119  -ECG 7/26/22 - sinus bradycardia, rate 58 bpm, lateral ST T-wave abnormalities - no acute change from prior ECG in 6/22     2  Accelerated hypertension (POA)  -/93 on admission, last recorded 142/72   -Outpatient BP regimen; amlodipine 10 mg daily, lisinopril 20 mg daily, and metoprolol tartrate 50 mg q 12 hours  -Inpatient BP regimen; amlodipine 10 mg daily, lisinopril 40 mg daily, and metoprolol tartrate 50 mg q 12 hours  3   Mild volume overload / orthopnea  ?  Mild CHF exacerbation  -Since his recent NSTEMI back in June 2022 he has been complaining of persistent orthopnea at [facility] and when lying on his living room couch watching TV  He denies progressive daytime shortness of breath or dyspnea  -Chest x-ray - appears to have mild pulmonary vascular congestion   -  -Not previously on diuretics  4  Sinus bradycardia  ? Symptomatic - Pt correlates administration of metoprolol with symptoms of upset stomach/nausea and diarrhea  He denies dizziness or lightheadedness  -ECG on admission, HR 58    -Not currently on telemetry  Heart rate currently in the low to mid 50s on Bill monitoring    -On metoprolol tartrate 50 mg q 12 hours (recently up titrated as an outpatient from 25 mg q 12 hours for more effective BP control)    5  COVID-19  -Management per the primary team   -Saturating in the mid 90s on RA  -On the mild COVID pathway    6   Recent NSTEMI (6/28/2022)  7  CAD s/p PCI/ALLEN x3 (mid LCX, mid RCA, and distal RCA) - (6/28/2022)  -[facility] 6/28/2022 - pLAD 50% stenosis, not flow-limiting by iFR (iFR = 0 92), mid LCX 70% stenosis, mid RCA 70% stenosis, and distal RCA 90% stenosis  -On DAPT w/aspirin 81 mg daily, and Brilinta 90 mg q 12 hours   -On high-intensity statin, and BB  8  Preserved LV systolic function  9  Mild-to-moderate AI   -TTE 6/28/2022 - LVEF 60%, grade 1 DD, mild LVH, hypokinetic basal inferior, basal inferior lateral, mid inferior, and mid inferior lateral wall, LA mildly dilated, mild-to-moderate AI, mild MR   10  PAD s/p prior R femoral-popliteal bypass (11/2017)  -On DAPT, and high-intensity statin therapy  11  Dyslipidemia  -Lipid profile 6/28/2022 - cholesterol 157, triglycerides 94, HDL 34, and     -On atorvastatin 40 mg daily this admission  12  Tobacco abuse (50 pack per year history)  -Still smoking  Finds it difficult to quit   -Counseled extensively on smoking cessation  Plan  -Follow-up limited TTE imaging results    -Trial a 1 time dose of IV Lasix 20 mg and assess his response to this  -DAPT w/aspirin/Brilinta, and high-intensity statin  -DC metoprolol tartrate -- ? GI side effects (see above)--- start Coreg 6 25 mg b i d provide more effective BP control as well  -COVID management per the primary team   -Monitor renal function and electrolytes closely  Replete to maintain K +level 4 0, magnesium 2 0   -Place on telemetry for 24 hours to assess heart rate trends and to rule out any significant arrhythmias while hospitalized  -Extensive counseling on smoking cessation  HPI: Haley Pewamo Cioc 70y o  year old male with a medical history of a recent NSTEMI (6/28/22), CAD s/p PCI/ALLEN x3 (mid LCX, mid RCA, and distal RCA), preserved LV systolic function, mild to moderate AI, PAD s//p prior right fem-popliteal bypass (11/2017), dyslipidemia, and ongoing nicotine dependence (50 pack per year history)    Denies Patient is a 72y old  Male who presents with a chief complaint of s/p cardiac cath (22 Sep 2017 12:34)      SUBJECTIVE / OVERNIGHT EVENTS:    MEDICATIONS  (STANDING):  tamsulosin 0.4 milliGRAM(s) Oral at bedtime  clopidogrel Tablet 75 milliGRAM(s) Oral daily  busPIRone 5 milliGRAM(s) Oral two times a day  atorvastatin 40 milliGRAM(s) Oral at bedtime  aspirin  chewable 81 milliGRAM(s) Oral daily  insulin lispro (HumaLOG) corrective regimen sliding scale   SubCutaneous every 6 hours  metoprolol 25 milliGRAM(s) Enteral Tube two times a day  amiodarone Infusion 0.5 mG/Min (16.667 mL/Hr) IV Continuous <Continuous>  pantoprazole    Tablet 40 milliGRAM(s) Oral before breakfast  amiodarone    Tablet 400 milliGRAM(s) Oral every 8 hours  heparin  Infusion 650 Unit(s)/Hr (6.5 mL/Hr) IV Continuous <Continuous>    MEDICATIONS  (PRN):        CAPILLARY BLOOD GLUCOSE  115 (26 Sep 2017 13:00)        I&O's Summary    26 Sep 2017 07:01  -  27 Sep 2017 07:00  --------------------------------------------------------  IN: 1583.1 mL / OUT: 2600 mL / NET: -1016.9 mL    PHYSICAL EXAM:  GENERAL: NAD  HEAD: NC/AT  NECK: Supple  CHEST/LUNG: Grossly CTAB anteriorly, No wheeze appreciated  HEART: Irregular rhythm, Normal rate  ABDOMEN: +BS, Soft, Nontender, No rigidity  EXTREMITIES: No significant peripheral edema  PSYCH: Calm  NEUROLOGY: PERRLA, Moves LUE on command, Able to move proximal LLE, No movement seen of RUE/RLE  SKIN: Ecchymosis seen on left proximal anterior thigh extending to LLQ/Left flank which has not worsened compared to yesterday    LABS:                        8.0    8.9   )-----------( 155      ( 27 Sep 2017 04:46 )             23.5     09-27    136  |  94<L>  |  41<H>  ----------------------------<  168<H>  4.0   |  23  |  4.85<H>    Ca    8.5      27 Sep 2017 04:47  Phos  4.4     09-27  Mg     2.4     09-27    TPro  6.4  /  Alb  2.6<L>  /  TBili  0.6  /  DBili  x   /  AST  56<H>  /  ALT  40  /  AlkPhos  99  09-27    PTT - ( 27 Sep 2017 04:46 )  PTT:46.9 sec    RADIOLOGY & ADDITIONAL TESTS:    TTE (9/22): Endocardial visualization enhanced with intravenous injection of echo contrast (Definity). Severe segmental left ventricular systolic dysfunction. EF approximately 20%. The inferior, anterior, septal, and apical cap are akinetic. The lateral and inferolateral walls are the best preserved. No left ventricular thrombus. The right ventricle is not well visualized; grossly normal right ventricular systolic function. No pericardial effusion seen.    Head CT (9/23): Multiple areas of hypodensities within the bilateral cerebral and cerebellar hemispheres concerning for embolic infarcts.     CT A+P (9/23): No retroperitoneal hemorrhage. Slightly decreased in size of the left hematoma superficial to the left femoral artery and a decrease in the subcutaneous fat stranding in the left inguinal region. The right hydronephrosis and hydroureter have resolved. The calculi at the right UVJ have migrated into the bladder. No change in the additional, bilateral nonobstructing renal calculi. Multiple bladder calculi.    LLE U/S (9/25): Small left groin hematoma components, without evidence of pseudoaneurysm.          RADIOLOGY & ADDITIONAL TESTS:    Imaging Personally Reviewed:    Consultant(s) Notes Reviewed:      Care Discussed with Consultants/Other Providers: excessive alcohol or recreational drug use  He was recently hospitalized at the [facility] 6/28 through 6/29/22 after presenting with an NSTEMI  He underwent coronary angiography on 6/28/22 which demonstrated severe 2 vessel CAD with moderate nonobstructive LAD disease  He underwent PCI/ALLEN x3  He was placed on dual anti-platelet therapy, high-intensity statin, and beta-blocker during this admission  He was discharged home on 6/29 and did follow-up with the cardiology nurse practitioner in the office on 7/18  He was overall reported to have been doing well from a cardiac perspective  It was noted that his blood pressure was elevated during this office visit and his metoprolol had been up titrated to 50 mg q 12 hours and was continued on amlodipine/lisinopril at their prior doses  Presented to the [facility] on 7/26/2022 with a 1 month complaints of orthopnea progressively worsened over the past 1-2 days, and abdominal discomfort/upset stomach, nausea and diarrhea over the last week to 10 days  Further workup in the ED  Hemodynamics on admission  -Temp 98 2° F, HR 60, R 18, /93, sat 98% on RA  Laboratory data on admission  -NA + 142, K +3 8, chloride 112, CO2 24, anion gap 6, BUN 22, creatinine 1 1, glucose 108, calcium 8 6, normal LFTs, albumin 3 8, phosphorus 2 3, magnesium 2 2, WBC 7 5, HGB 14 8, and platelet count 343  -HS troponin levels; 77 -- 89 -- 119  -  -SARS-COVID-2 +   -D-dimer 1 52  Imaging  Chest x-ray - no acute cardiopulmonary disease  Cardiology was consulted for further treatment recommendations/management      Family History:   Family History   Problem Relation Age of Onset    Stomach cancer Mother     No Known Problems Father      Historical Information   Past Medical History:   Diagnosis Date    Hypertension      Past Surgical History:   Procedure Laterality Date    ARTERY SURGERY  2018    CARDIAC CATHETERIZATION N/A 6/28/2022    Procedure: CARDIAC CATHETERIZATION;  Surgeon: Margie Malagon MD;  [facility]; Service: Cardiology    CARDIAC CATHETERIZATION N/A 6/28/2022    Procedure: Cardiac pci;  Surgeon: Margie Malagon MD;  [facility]; Service: Cardiology     Social History   Social History     Substance and Sexual Activity   Alcohol Use Yes    Comment: ocassionally     Social History     Substance and Sexual Activity   Drug Use Never     Social History     Tobacco Use   Smoking Status Heavy Tobacco Smoker    Packs/day: 0 50    Types: Cigarettes    Start date: 3/11/1975   Smokeless Tobacco Never Used     Family History:   Family History   Problem Relation Age of Onset    Stomach cancer Mother     No Known Problems Father        Review of Systems:  Review of Systems   Constitutional: Negative for chills, fatigue and fever  Respiratory: Positive for cough  Negative for chest tightness and shortness of breath  Cardiovascular: Negative for chest pain, palpitations and leg swelling         +orthopnea   Gastrointestinal: Positive for diarrhea and nausea  Neurological: Negative for dizziness, light-headedness and headaches  All other systems reviewed and are negative            Scheduled Meds:  Current Facility-Administered Medications   Medication Dose Route Frequency Provider Last Rate    amLODIPine  10 mg Oral Daily Nick Estrella MD      aspirin  81 mg Oral Daily Nick Estrella MD      atorvastatin  40 mg Oral Daily With Antolin Patricio MD      [START ON 7/28/2022] lisinopril  40 mg Oral Daily Marjorie Shearer MD      metoprolol tartrate  50 mg Oral Q12H [address] Nick Estrella MD      pantoprazole  40 mg Oral Early Morning Marjorie Shearer MD      ticagrelor  90 mg Oral Q12H [address] Nick Estrella MD       Continuous Infusions:   PRN Meds:   all current active meds have been reviewed and current meds:   Current Facility-Administered Medications   Medication Dose Route Frequency    Patient is a 72y old  Male who presents with a chief complaint of s/p cardiac cath (22 Sep 2017 12:34)    SUBJECTIVE / OVERNIGHT EVENTS:  Patient transitioned to PO Amiodarone through NGT overnight. Amiodarone gtt was discontinued. Patient's brothers were at bedside at time of visit. Patient is Burmese-speaking and so interview was conducted with the help of patient's brothers who helped translate.    MEDICATIONS  (STANDING):  tamsulosin 0.4 milliGRAM(s) Oral at bedtime  clopidogrel Tablet 75 milliGRAM(s) Oral daily  busPIRone 5 milliGRAM(s) Oral two times a day  atorvastatin 40 milliGRAM(s) Oral at bedtime  aspirin  chewable 81 milliGRAM(s) Oral daily  insulin lispro (HumaLOG) corrective regimen sliding scale   SubCutaneous every 6 hours  metoprolol 25 milliGRAM(s) Enteral Tube two times a day  amiodarone Infusion 0.5 mG/Min (16.667 mL/Hr) IV Continuous <Continuous>  pantoprazole    Tablet 40 milliGRAM(s) Oral before breakfast  amiodarone    Tablet 400 milliGRAM(s) Oral every 8 hours  heparin  Infusion 650 Unit(s)/Hr (6.5 mL/Hr) IV Continuous <Continuous>    MEDICATIONS  (PRN):        CAPILLARY BLOOD GLUCOSE  115 (26 Sep 2017 13:00)        I&O's Summary    26 Sep 2017 07:01  -  27 Sep 2017 07:00  --------------------------------------------------------  IN: 1583.1 mL / OUT: 2600 mL / NET: -1016.9 mL    PHYSICAL EXAM:  GENERAL: NAD  HEAD: NC/AT  NECK: Supple  CHEST/LUNG: Grossly CTAB anteriorly, No wheeze appreciated  HEART: Irregular rhythm, Normal rate  ABDOMEN: +BS, Soft, Nontender, No rigidity  EXTREMITIES: No significant peripheral edema  PSYCH: Calm  NEUROLOGY: PERRLA, Moves LUE on command, Able to move proximal LLE, No movement seen of RUE/RLE  SKIN: Ecchymosis seen on left proximal anterior thigh extending to LLQ/Left flank which has not worsened compared to yesterday    LABS:                        8.0    8.9   )-----------( 155      ( 27 Sep 2017 04:46 )             23.5     09-27    136  |  94<L>  |  41<H>  ----------------------------<  168<H>  4.0   |  23  |  4.85<H>    Ca    8.5      27 Sep 2017 04:47  Phos  4.4     09-27  Mg     2.4     09-27    TPro  6.4  /  Alb  2.6<L>  /  TBili  0.6  /  DBili  x   /  AST  56<H>  /  ALT  40  /  AlkPhos  99  09-27    PTT - ( 27 Sep 2017 04:46 )  PTT:46.9 sec    RADIOLOGY & ADDITIONAL TESTS:    TTE (9/22): Endocardial visualization enhanced with intravenous injection of echo contrast (Definity). Severe segmental left ventricular systolic dysfunction. EF approximately 20%. The inferior, anterior, septal, and apical cap are akinetic. The lateral and inferolateral walls are the best preserved. No left ventricular thrombus. The right ventricle is not well visualized; grossly normal right ventricular systolic function. No pericardial effusion seen.    Head CT (9/23): Multiple areas of hypodensities within the bilateral cerebral and cerebellar hemispheres concerning for embolic infarcts.     CT A+P (9/23): No retroperitoneal hemorrhage. Slightly decreased in size of the left hematoma superficial to the left femoral artery and a decrease in the subcutaneous fat stranding in the left inguinal region. The right hydronephrosis and hydroureter have resolved. The calculi at the right UVJ have migrated into the bladder. No change in the additional, bilateral nonobstructing renal calculi. Multiple bladder calculi.    LLE U/S (9/25): Small left groin hematoma components, without evidence of pseudoaneurysm.          RADIOLOGY & ADDITIONAL TESTS:    Imaging Personally Reviewed:    Consultant(s) Notes Reviewed:      Care Discussed with Consultants/Other Providers: Patient is a 72y old  Male who presents with a chief complaint of s/p cardiac cath (22 Sep 2017 12:34)    SUBJECTIVE / OVERNIGHT EVENTS:  Patient transitioned to PO Amiodarone through NGT overnight. Amiodarone gtt was discontinued. Patient's brothers were at bedside at time of visit. Patient is Mauritanian-speaking and so interview was conducted with the help of patient's brothers who helped translate. Patient continues to remain grossly nonverbal. His mental status is improved and he remains alert.    MEDICATIONS  (STANDING):  tamsulosin 0.4 milliGRAM(s) Oral at bedtime  clopidogrel Tablet 75 milliGRAM(s) Oral daily  busPIRone 5 milliGRAM(s) Oral two times a day  atorvastatin 40 milliGRAM(s) Oral at bedtime  aspirin  chewable 81 milliGRAM(s) Oral daily  insulin lispro (HumaLOG) corrective regimen sliding scale   SubCutaneous every 6 hours  metoprolol 25 milliGRAM(s) Enteral Tube two times a day  amiodarone Infusion 0.5 mG/Min (16.667 mL/Hr) IV Continuous <Continuous>  pantoprazole    Tablet 40 milliGRAM(s) Oral before breakfast  amiodarone    Tablet 400 milliGRAM(s) Oral every 8 hours  heparin  Infusion 650 Unit(s)/Hr (6.5 mL/Hr) IV Continuous <Continuous>    MEDICATIONS  (PRN):        CAPILLARY BLOOD GLUCOSE  115 (26 Sep 2017 13:00)        I&O's Summary    26 Sep 2017 07:01  -  27 Sep 2017 07:00  --------------------------------------------------------  IN: 1583.1 mL / OUT: 2600 mL / NET: -1016.9 mL    PHYSICAL EXAM:  GENERAL: NAD  HEAD: NC/AT  NECK: Supple  CHEST/LUNG: Grossly CTAB anteriorly, No wheeze appreciated  HEART: Irregular rhythm, Normal rate  ABDOMEN: +BS, Soft, Nontender, No rigidity  EXTREMITIES: No significant peripheral edema  PSYCH: Calm  NEUROLOGY: PERRLA, Moves LUE on command, Able to move proximal LLE, No movement seen of RUE/RLE  SKIN: Ecchymosis seen on left proximal anterior thigh extending to LLQ/Left flank which has not worsened compared to yesterday    LABS:                        8.0    8.9   )-----------( 155      ( 27 Sep 2017 04:46 )             23.5     09-27    136  |  94<L>  |  41<H>  ----------------------------<  168<H>  4.0   |  23  |  4.85<H>    Ca    8.5      27 Sep 2017 04:47  Phos  4.4     09-27  Mg     2.4     09-27    TPro  6.4  /  Alb  2.6<L>  /  TBili  0.6  /  DBili  x   /  AST  56<H>  /  ALT  40  /  AlkPhos  99  09-27    PTT - ( 27 Sep 2017 04:46 )  PTT:46.9 sec    RADIOLOGY & ADDITIONAL TESTS:    TTE (9/22): Endocardial visualization enhanced with intravenous injection of echo contrast (Definity). Severe segmental left ventricular systolic dysfunction. EF approximately 20%. The inferior, anterior, septal, and apical cap are akinetic. The lateral and inferolateral walls are the best preserved. No left ventricular thrombus. The right ventricle is not well visualized; grossly normal right ventricular systolic function. No pericardial effusion seen.    Head CT (9/23): Multiple areas of hypodensities within the bilateral cerebral and cerebellar hemispheres concerning for embolic infarcts.     CT A+P (9/23): No retroperitoneal hemorrhage. Slightly decreased in size of the left hematoma superficial to the left femoral artery and a decrease in the subcutaneous fat stranding in the left inguinal region. The right hydronephrosis and hydroureter have resolved. The calculi at the right UVJ have migrated into the bladder. No change in the additional, bilateral nonobstructing renal calculi. Multiple bladder calculi.    LLE U/S (9/25): Small left groin hematoma components, without evidence of pseudoaneurysm.          RADIOLOGY & ADDITIONAL TESTS:    Imaging Personally Reviewed:    Consultant(s) Notes Reviewed:      Care Discussed with Consultants/Other Providers: amLODIPine (NORVASC) tablet 10 mg  10 mg Oral Daily    aspirin chewable tablet 81 mg  81 mg Oral Daily    atorvastatin (LIPITOR) tablet 40 mg  40 mg Oral Daily With Dinner    [START ON 7/28/2022] lisinopril (ZESTRIL) tablet 40 mg  40 mg Oral Daily    metoprolol tartrate (LOPRESSOR) tablet 50 mg  50 mg Oral Q12H VAL[place]    pantoprazole (PROTONIX) EC tablet 40 mg  40 mg Oral Early Morning    ticagrelor (BRILINTA) tablet 90 mg  90 mg Oral Q12H [address]       No Known Allergies    Objective   Vitals: Blood pressure 142/72, pulse (!) 54, temperature 98 3 °F (36 8 °C), resp  rate 18, height 5' 10" (1 778 m), weight 73 5 kg (162 lb 0 6 oz), SpO2 96 %  , Body mass index is 23 25 kg/m² ,   Orthostatic Blood Pressures    Flowsheet Row Most Recent Value   Blood Pressure 142/72 filed at 07/27/2022 1117   Patient Position - Orthostatic VS Lying filed at 07/27/2022 0756            Intake/Output Summary (Last 24 hours) at 7/27/2022 1151  Last data filed at 7/27/2022 0151  Gross per 24 hour   Intake 240 ml   Output --   Net 240 ml       Invasive Devices  Report    Peripheral Intravenous Line  Duration           Peripheral IV 07/26/22 Right Forearm <1 day              Physical Exam:  Physical Exam  Vitals and nursing note reviewed  Constitutional:       General: He is not in acute distress  Appearance: He is not diaphoretic  HENT:      Head: Normocephalic and atraumatic  Mouth/Throat:      Mouth: Mucous membranes are moist    Eyes:      General: No scleral icterus  Cardiovascular:      Rate and Rhythm: Normal rate and regular rhythm  Pulses: Normal pulses  Heart sounds: Murmur heard  Pulmonary:      Effort: Pulmonary effort is normal       Breath sounds: No wheezing or rales  Abdominal:      Palpations: Abdomen is soft  Tenderness: There is no abdominal tenderness  Musculoskeletal:      Cervical back: Neck supple  Right lower leg: No edema  Left lower leg: No edema     Skin:     General: Skin Patient is a 72y old  Male who presents with a chief complaint of s/p cardiac cath (22 Sep 2017 12:34)    SUBJECTIVE / OVERNIGHT EVENTS:  Patient transitioned to PO Amiodarone through NGT overnight. Amiodarone gtt was discontinued. Patient's heart rhythm converted to NSR overnight. Patient's brothers were at bedside at time of visit. Patient is Uruguayan-speaking and so interview was conducted with the help of patient's brothers who helped translate. Patient continues to remain grossly nonverbal. He is alert and able to follow some commands. He continues to have good strength of his LUE and some movement of his LLE. No noticeable movement of RUE/RLE seen. Patient currently breathing comfortably on RA.    MEDICATIONS  (STANDING):  tamsulosin 0.4 milliGRAM(s) Oral at bedtime  clopidogrel Tablet 75 milliGRAM(s) Oral daily  busPIRone 5 milliGRAM(s) Oral two times a day  atorvastatin 40 milliGRAM(s) Oral at bedtime  aspirin  chewable 81 milliGRAM(s) Oral daily  insulin lispro (HumaLOG) corrective regimen sliding scale   SubCutaneous every 6 hours  metoprolol 25 milliGRAM(s) Enteral Tube two times a day  amiodarone Infusion 0.5 mG/Min (16.667 mL/Hr) IV Continuous <Continuous>  pantoprazole    Tablet 40 milliGRAM(s) Oral before breakfast  amiodarone    Tablet 400 milliGRAM(s) Oral every 8 hours  heparin  Infusion 650 Unit(s)/Hr (6.5 mL/Hr) IV Continuous <Continuous>    MEDICATIONS  (PRN):    CAPILLARY BLOOD GLUCOSE  115 (26 Sep 2017 13:00)    I&O's Summary    26 Sep 2017 07:01  -  27 Sep 2017 07:00  --------------------------------------------------------  IN: 1583.1 mL / OUT: 2600 mL / NET: -1016.9 mL    PHYSICAL EXAM:  GENERAL: NAD  HEAD: NC/AT  NECK: Supple  CHEST/LUNG: Grossly CTAB anteriorly, No wheeze appreciated  HEART: RRR  ABDOMEN: +BS, Soft, Nontender, No rigidity  EXTREMITIES: No significant peripheral edema  PSYCH: Calm  NEUROLOGY: PERRLA, Moves LUE on command, Able to move proximal LLE, No movement seen of RUE/RLE  SKIN: No significant change of ecchymosis on left proximal anterior thigh which extends to LLQ/Left flank    LABS:                        8.0    8.9   )-----------( 155      ( 27 Sep 2017 04:46 )             23.5     09-27    136  |  94<L>  |  41<H>  ----------------------------<  168<H>  4.0   |  23  |  4.85<H>    Ca    8.5      27 Sep 2017 04:47  Phos  4.4     09-27  Mg     2.4     09-27    TPro  6.4  /  Alb  2.6<L>  /  TBili  0.6  /  DBili  x   /  AST  56<H>  /  ALT  40  /  AlkPhos  99  09-27    PTT - ( 27 Sep 2017 04:46 )  PTT:46.9 sec    RADIOLOGY & ADDITIONAL TESTS:    TTE (9/22): Endocardial visualization enhanced with intravenous injection of echo contrast (Definity). Severe segmental left ventricular systolic dysfunction. EF approximately 20%. The inferior, anterior, septal, and apical cap are akinetic. The lateral and inferolateral walls are the best preserved. No left ventricular thrombus. The right ventricle is not well visualized; grossly normal right ventricular systolic function. No pericardial effusion seen.    Head CT (9/23): Multiple areas of hypodensities within the bilateral cerebral and cerebellar hemispheres concerning for embolic infarcts.     CT A+P (9/23): No retroperitoneal hemorrhage. Slightly decreased in size of the left hematoma superficial to the left femoral artery and a decrease in the subcutaneous fat stranding in the left inguinal region. The right hydronephrosis and hydroureter have resolved. The calculi at the right UVJ have migrated into the bladder. No change in the additional, bilateral nonobstructing renal calculi. Multiple bladder calculi.    LLE U/S (9/25): Small left groin hematoma components, without evidence of pseudoaneurysm. is warm and dry  Capillary Refill: Capillary refill takes less than 2 seconds  Neurological:      General: No focal deficit present  Mental Status: He is alert and oriented to person, place, and time     Psychiatric:         Mood and Affect: Mood normal          Lab Results:   Recent Results (from the past 24 hour(s))   ECG 12 lead    Collection Time: 07/26/22  6:04 PM   Result Value Ref Range    Ventricular Rate 58 BPM    Atrial Rate 58 BPM    NM Interval 200 ms    QRSD Interval 74 ms    QT Interval 458 ms    QTC Interval 449 ms    P Axis 85 degrees    QRS Axis 73 degrees    T Wave Axis 75 degrees   CBC and differential    Collection Time: 07/26/22  7:11 PM   Result Value Ref Range    WBC 7 54 4 31 - 10 16 Thousand/uL    RBC 5 07 3 88 - 5 62 Million/uL    Hemoglobin 14 8 12 0 - 17 0 g/dL    Hematocrit 45 4 36 5 - 49 3 %    MCV 90 82 - 98 fL    MCH 29 2 26 8 - 34 3 pg    MCHC 32 6 31 4 - 37 4 g/dL    RDW 15 8 (H) 11 6 - 15 1 %    MPV 11 7 8 9 - 12 7 fL    Platelets 383 (L) 426 - 390 Thousands/uL    nRBC 0 /100 WBCs    Neutrophils Relative 49 43 - 75 %    Immat GRANS % 0 0 - 2 %    Lymphocytes Relative 33 14 - 44 %    Monocytes Relative 14 (H) 4 - 12 %    Eosinophils Relative 3 0 - 6 %    Basophils Relative 1 0 - 1 %    Neutrophils Absolute 3 77 1 85 - 7 62 Thousands/µL    Immature Grans Absolute 0 02 0 00 - 0 20 Thousand/uL    Lymphocytes Absolute 2 45 0 60 - 4 47 Thousands/µL    Monocytes Absolute 1 02 0 17 - 1 22 Thousand/µL    Eosinophils Absolute 0 24 0 00 - 0 61 Thousand/µL    Basophils Absolute 0 04 0 00 - 0 10 Thousands/µL   Comprehensive metabolic panel    Collection Time: 07/26/22  7:11 PM   Result Value Ref Range    Sodium 142 135 - 147 mmol/L    Potassium 3 8 3 5 - 5 3 mmol/L    Chloride 112 (H) 96 - 108 mmol/L    CO2 24 21 - 32 mmol/L    ANION GAP 6 4 - 13 mmol/L    BUN 22 5 - 25 mg/dL    Creatinine 1 09 0 60 - 1 30 mg/dL    Glucose 108 65 - 140 mg/dL    Calcium 8 6 8 4 - 10 2 mg/dL    AST 21 13 - 39 U/L    ALT 21 7 - 52 U/L    Alkaline Phosphatase 81 34 - 104 U/L    Total Protein 6 6 6 4 - 8 4 g/dL    Albumin 3 8 3 5 - 5 0 g/dL    Total Bilirubin 0 48 0 20 - 1 00 mg/dL    eGFR 66 ml/min/1 73sq m   HS Troponin 0hr (reflex protocol)    Collection Time: 07/26/22  7:11 PM   Result Value Ref Range    hs TnI 0hr 77 (H) "Refer to ACS Flowchart"- see link ng/L   B-Type Natriuretic Peptide(BNP) AN, CA, EA Campuses Only    Collection Time: 07/26/22  7:11 PM   Result Value Ref Range     (H) 0 - 100 pg/mL   C-reactive protein    Collection Time: 07/26/22  7:11 PM   Result Value Ref Range    CRP 3 6 (H) <3 0 mg/L   Ferritin    Collection Time: 07/26/22  7:11 PM   Result Value Ref Range    Ferritin 42 8 - 388 ng/mL   Magnesium    Collection Time: 07/26/22  7:11 PM   Result Value Ref Range    Magnesium 2 2 1 6 - 2 6 mg/dL   Phosphorus    Collection Time: 07/26/22  7:11 PM   Result Value Ref Range    Phosphorus 2 3 2 3 - 4 1 mg/dL   UA w Reflex to Microscopic w Reflex to Culture    Collection Time: 07/26/22  7:13 PM    Specimen: Urine, Clean Catch   Result Value Ref Range    Color, UA Light Yellow     Clarity, UA Clear     Specific Gravity, UA 1 017 1 003 - 1 030    pH, UA 6 0 4 5, 5 0, 5 5, 6 0, 6 5, 7 0, 7 5, 8 0    Leukocytes, UA Negative Negative    Nitrite, UA Negative Negative    Protein, UA Trace (A) Negative mg/dl    Glucose, UA Negative Negative mg/dl    Ketones, UA Negative Negative mg/dl    Urobilinogen, UA <2 0 <2 0 mg/dl mg/dl    Bilirubin, UA Negative Negative    Occult Blood, UA Negative Negative   FLU/RSV/COVID - if FLU/RSV clinically relevant    Collection Time: 07/26/22  7:13 PM    Specimen: Nose; Nares   Result Value Ref Range    SARS-CoV-2 Positive (A) Negative    INFLUENZA A PCR Negative Negative    INFLUENZA B PCR Negative Negative    RSV PCR Negative Negative   Urine Microscopic    Collection Time: 07/26/22  7:13 PM   Result Value Ref Range    RBC, UA None Seen None Seen, 1-2 /hpf    WBC, UA None Seen None Seen, 1-2 /hpf    Epithelial Cells None Seen None Seen, Occasional /hpf    Bacteria, UA None Seen None Seen, Occasional /hpf   HS Troponin I 2hr    Collection Time: 07/26/22  9:08 PM   Result Value Ref Range    hs TnI 2hr 89 (H) "Refer to ACS Flowchart"- see link ng/L    Delta 2hr hsTnI 12 <20 ng/L   HS Troponin I 4hr    Collection Time: 07/26/22 11:43 PM   Result Value Ref Range    hs TnI 4hr 119 (H) "Refer to ACS Flowchart"- see link ng/L    Delta 4hr hsTnI 42 (H) <20 ng/L   TSH, 3rd generation    Collection Time: 07/26/22 11:43 PM   Result Value Ref Range    TSH 3RD GENERATON 1 488 0 450 - 4 500 uIU/mL   CK (with reflex to MB)    Collection Time: 07/26/22 11:43 PM   Result Value Ref Range    Total CK 46 39 - 308 U/L   D-dimer, quantitative    Collection Time: 07/26/22 11:43 PM   Result Value Ref Range    D-Dimer, Quant 1 52 (H) <0 50 ug/ml FEU   Comprehensive metabolic panel    Collection Time: 07/27/22  5:59 AM   Result Value Ref Range    Sodium 139 135 - 147 mmol/L    Potassium 3 5 3 5 - 5 3 mmol/L    Chloride 110 (H) 96 - 108 mmol/L    CO2 23 21 - 32 mmol/L    ANION GAP 6 4 - 13 mmol/L    BUN 19 5 - 25 mg/dL    Creatinine 0 84 0 60 - 1 30 mg/dL    Glucose 98 65 - 140 mg/dL    Calcium 8 8 8 4 - 10 2 mg/dL    AST 19 13 - 39 U/L    ALT 20 7 - 52 U/L    Alkaline Phosphatase 79 34 - 104 U/L    Total Protein 6 6 6 4 - 8 4 g/dL    Albumin 3 8 3 5 - 5 0 g/dL    Total Bilirubin 0 74 0 20 - 1 00 mg/dL    eGFR 86 ml/min/1 73sq m   CBC and differential    Collection Time: 07/27/22  5:59 AM   Result Value Ref Range    WBC 7 35 4 31 - 10 16 Thousand/uL    RBC 5 17 3 88 - 5 62 Million/uL    Hemoglobin 14 9 12 0 - 17 0 g/dL    Hematocrit 45 5 36 5 - 49 3 %    MCV 88 82 - 98 fL    MCH 28 8 26 8 - 34 3 pg    MCHC 32 7 31 4 - 37 4 g/dL    RDW 15 5 (H) 11 6 - 15 1 %    MPV 11 9 8 9 - 12 7 fL    Platelets 943 526 - 320 Thousands/uL    nRBC 0 /100 WBCs    Neutrophils Relative 54 43 - 75 %    Immat GRANS % 0 0 - 2 %    Lymphocytes Relative 29 14 - 44 %    Monocytes Relative 14 (H) 4 - 12 %    Eosinophils Relative 2 0 - 6 %    Basophils Relative 1 0 - 1 %    Neutrophils Absolute 3 96 1 85 - 7 62 Thousands/µL    Immature Grans Absolute 0 03 0 00 - 0 20 Thousand/uL    Lymphocytes Absolute 2 11 0 60 - 4 47 Thousands/µL    Monocytes Absolute 1 04 0 17 - 1 22 Thousand/µL    Eosinophils Absolute 0 17 0 00 - 0 61 Thousand/µL    Basophils Absolute 0 04 0 00 - 0 10 Thousands/µL     Imaging: I have personally reviewed pertinent reports  and I have personally reviewed pertinent films in PACS  Code Status:  Level 1 full code  Epic/ Allscripts/Care Everywhere records reviewed: Yes    * Please Note: Fluency DirectDictation voice to text software may have been used in the creation of this document   **

## 2022-08-24 NOTE — DIETITIAN INITIAL EVALUATION ADULT. - PROBLEM SELECTOR PLAN 5
- no urinary retention at this time  - c/w Flomax Rotation Flap Text: The defect edges were debeveled with a #15 scalpel blade.  Given the location of the defect, shape of the defect and the proximity to free margins a rotation flap was deemed most appropriate.  Using a sterile surgical marker, an appropriate rotation flap was drawn incorporating the defect and placing the expected incisions within the relaxed skin tension lines where possible.    The area thus outlined was incised deep to adipose tissue with a #15 scalpel blade.  The skin margins were undermined to an appropriate distance in all directions utilizing iris scissors.

## 2022-08-24 NOTE — PATIENT PROFILE ADULT. - BLOOD TRANSFUSION, PREVIOUS, PROFILE
unable to determine/no Mirvaso Pregnancy And Lactation Text: This medication has not been assigned a Pregnancy Risk Category. It is unknown if the medication is excreted in breast milk.

## 2022-08-31 NOTE — PHYSICAL THERAPY INITIAL EVALUATION ADULT - BALANCE DISTURBANCE, SYSTEM IMPAIRMENT CONTRIBUTE, REHAB EVAL
You can access the FollowMyHealth Patient Portal offered by [facility] by registering at the following website: [email or web address]. By joining [facility]’s FollowMyHealth portal, you will also be able to view your health information using other applications (apps) compatible with our system.
musculoskeletal/cognitive/neuromuscular

## 2022-10-05 NOTE — DIETITIAN INITIAL EVALUATION ADULT. - ETIOLOGY
"----- Message from Nga Arthur sent at 10/5/2022 11:42 AM CDT -----  Regarding: Reschedule Existing Appointment        Appt Date:    10/07    Type of appt:   Np    Reason for rescheduling?   There was a mix up with the doctors for pt's Ct scan and they need to reschedule their appt after 10/19.    Caller:    Lizbeth    Contact Preference:   [phone]      "Thank you for all that you do for our patients"     " RELATED TO Physiological causes - renal dysfunction

## 2022-10-08 NOTE — ED PROVIDER NOTE - RATE
89 yo M with PMH end stage renal cancer presents to the ED with unresponsiveness being admitted for sepsis and unresponsiveness. 79

## 2022-12-10 NOTE — PHYSICAL THERAPY INITIAL EVALUATION ADULT - PRECAUTIONS/LIMITATIONS, REHAB EVAL
Diet, DASH/TLC:   Sodium & Cholesterol Restricted  Easy to Chew (EASYTOCHEW) (12-09-22 @ 12:00) [Active]      
fall precautions

## 2022-12-21 NOTE — DISCHARGE NOTE ADULT - INSTRUCTIONS
Update History & Physical    The patient's History and Physical of December 13, 2022 was reviewed with the patient and I examined the patient. There was no change. The surgical site was confirmed by the patient and me. Plan: The risks, benefits, expected outcome, and alternative to the recommended procedure have been discussed with the patient. Patient understands and wants to proceed with the procedure. Proceed with JERROD DCCV.      Electronically signed by Rachel Almaguer MD on 12/21/2022 at 11:21 AM low salt low fat 1800 isac ADA renal  diet

## 2023-01-01 NOTE — PROGRESS NOTE ADULT - PROBLEM SELECTOR PLAN 1
REPORT GIVEN TO GERRI LUBIN. < from: CT Abdomen and Pelvis w/ IV Cont (06.22.18 @ 10:41) Atrophic kidneys with cortical scarring and heterogeneous right renal   enhancement, question pyelonephritis. Correlate with urinalysis/culture.  Circumferential mild rectal wall thickening. Correlate for signs of   proctitis.Bladder stone.Bilateral pleural effusions.   s/p ampicillin 2 gm iv q 12 for 14 days of abx with ampicillin

## 2023-01-23 NOTE — PROGRESS NOTE ADULT - PROBLEM SELECTOR PLAN 7
PAST SURGICAL HISTORY:  No significant past surgical history        - holding coumadin in setting of possible procedures for short term dialysis access and AV fistula on Thursday

## 2023-02-23 NOTE — PROCEDURE NOTE - NSINDICATIONS_GEN_A_CORE
Patient advised to get metabolic labs including uric acid to assess  baseline  Started on allopurinol 100 mg  We will follow-up after 1 week to review labs  critical illness

## 2023-04-21 NOTE — PROGRESS NOTE ADULT - ASSESSMENT
(between 7am- 5pm, Mon-Fri). After hours you should contact your physician. [facility]  Cardiovascular Special Procedures  General Discharge Instructions    PROCEDURE: ____________________________________________________    _x___ Nithya Myrick may be drowsy or lightheaded after receiving sedation. DO NOT operate a vehicle (automobile, bicycle, motorcycle, machinery, or power tools), make any important decisions or sign any important/legal documents, or drink alcoholic beverages for the next 24 hours  _x___ We strongly suggest that a responsible adult be with you for the next 24 hours for your protection and safety  ____ If the intravenous catheter site is painful, apply warm wet compresses on the site until the soreness is relieved and elevate the arm above the heart. Call your physician if no improvement in 2 to 3 days    DIETARY INSTRUCTIONS:    ____ Drink extra fluids over the next 24 hours (If not contraindicated by illness or by physician order)  ____ Start with clear liquids and progress to normal diet as you feel like eating. If you experience nausea or repeated episodes of vomiting, which persist beyond 12-24 hours, notify your doctor        _x___ Resume your previous diet      MEDICATION INSTRUCTIONS:    ____ See Medication Reconciliation Sheet      Call: _________________________________     FOLLOW-UP APPOINTMENT    Follow up with MD as directed. Belongings returned to patient and/or family: Yes. The Discharge Instructions have been explained to me. I understand and can verbalize these instructions. 71 yo M w/PMH of extensive CAD, T2DM, CKD, prior CVA transferred from OSH for NSTEMI s/p Impella-assisted LHC w/BABAR x 5 and obstructive nephropathy requiring HD transferred from CCU after PEA/Vtach arrest with ROSC c/b new embolic CVA.

## 2023-05-05 NOTE — PROGRESS NOTE ADULT - PROBLEM SELECTOR PLAN 8
35 - Patient with severe debility due to CVA, dementia - supportive care  - Patient is DNR - Patient with severe debility due to CVA, advanced vascular dementia  - supportive care  - Patient is DNR  - Poor prognosis

## 2023-08-14 NOTE — PROCEDURE NOTE - NSINFORMCONSENT_GEN_A_CORE
unable to perform
Benefits, risks, and possible complications of procedure explained to patient/caregiver who verbalized understanding and gave written consent.

## 2023-08-18 NOTE — PROGRESS NOTE ADULT - SUBJECTIVE AND OBJECTIVE BOX
Stable on HD, non-verbal    Vital Signs Last 24 Hrs  T(C): 36.7 (10-31-17 @ 14:45), Max: 37 (10-31-17 @ 08:30)  T(F): 98 (10-31-17 @ 14:45), Max: 98.6 (10-31-17 @ 08:30)  HR: 90 (10-31-17 @ 14:45) (77 - 90)  BP: 115/75 (10-31-17 @ 14:45) (112/64 - 133/71)  BP(mean): --  RR: 17 (10-31-17 @ 14:45) (16 - 18)  SpO2: 96% (10-31-17 @ 14:45) (96% - 100%)    Respiratory: b/l air entry, clear  Cardiovascular: S1 S2 regular  Gastrointestinal: soft, NT, BS present  Extremities: no edema     ALBUTerol    90 MICROgram(s) HFA Inhaler 2 Puff(s) Inhalation every 6 hours PRN  amiodarone    Tablet 200 milliGRAM(s) Oral daily  artificial  tears Solution 1 Drop(s) Both EYES four times a day  aspirin enteric coated 81 milliGRAM(s) Oral daily  atorvastatin 40 milliGRAM(s) Oral at bedtime  BACItracin   Ointment 1 Application(s) Topical two times a day  busPIRone 5 milliGRAM(s) Oral two times a day  clopidogrel Tablet 75 milliGRAM(s) Oral daily  clotrimazole/betamethasone Cream 1 Application(s) Topical two times a day  dextrose 5%. 1000 milliLiter(s) IV Continuous <Continuous>  dextrose 50% Injectable 12.5 Gram(s) IV Push once  dextrose 50% Injectable 25 Gram(s) IV Push once  dextrose 50% Injectable 25 Gram(s) IV Push once  dextrose Gel 1 Dose(s) Oral once PRN  epoetin georgie Injectable 54065 Unit(s) IV Push <User Schedule>  fluticasone propionate   110 MICROgram(s) HFA Inhaler 1 Puff(s) Inhalation daily  glucagon  Injectable 1 milliGRAM(s) IntraMuscular once PRN  insulin lispro (HumaLOG) corrective regimen sliding scale   SubCutaneous Before meals and at bedtime  Nephro-sophie 1 Tablet(s) Oral daily  pantoprazole    Tablet 40 milliGRAM(s) Oral before breakfast  tamsulosin 0.4 milliGRAM(s) Oral at bedtime  warfarin 3 milliGRAM(s) Oral once                           10.5   7.2   )-----------( 327      ( 31 Oct 2017 06:42 )             31.2     31 Oct 2017 06:42    142    |  99     |  63     ----------------------------<  113    4.1     |  25     |  6.49     Ca    9.6        31 Oct 2017 06:42  Phos  3.1       31 Oct 2017 06:42  Mg     2.4       31 Oct 2017 06:42        Assessment and Recommendation:     ESRD on HD  HD TTS, TMP as able  Procrit w/HD  HD cath site clean, no erythema, no d/c  Adm w/bacteremia, initially thought to be contam, but repeat bld cx w/CNS again  On Vanco 3 x week per ID  If f/u blood cx still positive, would favor d/c perm Cath   Vascular to eval for AVF pending clinical course  D/w medical team Elevated lipase GERD (gastroesophageal reflux disease) Transaminitis

## 2023-08-25 NOTE — GOALS OF CARE CONVERSATION - PERSONAL ADVANCE DIRECTIVE - DECISION MAKER
HISTORY:    1. Follow-up BPH on tamsulosin, increasing frequency voiding small amounts more often    2. His brother has had kidney cancer, recent nephrectomy at [facility]. This patient was concerned, we did ultrasound of kidneys in December 2022, it was normal     Cystoscopy     Date/Time 8/25/2023 11:00 AM     Performed by  Edy Nava MD   Authorized by Edy Nava MD         Procedure Details:  Procedure type: cystoscopy    Patient tolerance: Patient tolerated the procedure well with no immediate complications    Additional Procedure Details:      Patient presents for cystoscopy. I have discussed the reasons for doing the exam, and the potential risks and complications. Patient expressed understanding, and signed informed consent document. The patient was carefully  positioned supine on the examining table. Sterile preparation was performed on the urethra. Xylocaine jelly was instilled and left  Indwelling for the procedure. The 13 [place] flexible cystoscope was passed with the following findings:      Urethra: No stricture    Prostate:  lateral lobes significant enlargement, obstructing    Bladder: Diverticula formation, severe trabeculation, no lesions, tumor, or stones. Residual urine: 100 mL    Patient tolerated the procedure well and was escorted from the examining table. ASSESSMENT / PLAN:    Significant bladder outlet obstruction on cystoscopy    Patient empties her bladder well and he is comfortable with his voiding pattern, despite the nocturia x2-3    Continue tamsulosin and finasteride, follow-up 1 year.     Does not need PSA    The following portions of the patient's history were reviewed and updated as appropriate: allergies, current medications, past family history, past medical history, past social history, past surgical history and problem list.    Review of Systems      Objective:     Physical Exam      No results found for: "PSA"]  No results found for:
"BUN"  No results found for: "CREATININE"  No components found for: "CBC"      Patient Active Problem List   Diagnosis   • Amnestic MCI (mild cognitive impairment with memory loss)   • Arteriosclerosis of abdominal aorta (HCC)   • Atherosclerosis of native coronary artery of native heart without angina pectoris   • Bruit of large artery   • Class 1 obesity due to excess calories with serious comorbidity and body mass index (BMI) of 32.0 to 32.9 in adult   • Dyslipidemia   • Essential hypertension   • Hyperglycemia   • Paroxysmal SVT (supraventricular tachycardia) ([facility])   • S/P PTCA (percutaneous transluminal coronary angioplasty)   • Spinal stenosis of lumbar region with neurogenic claudication        There are no diagnoses linked to this encounter. Patient ID: Charisma Bullock is a 80 y.o. male. Current Outpatient Medications:   •  aspirin (ECOTRIN LOW STRENGTH) 81 mg EC tablet, Take 81 mg by mouth daily, Disp: , Rfl:   •  Coenzyme Q10 10 MG capsule, Take 10 mg by mouth daily, Disp: , Rfl:   •  finasteride (PROSCAR) 5 mg tablet, Take 5 mg by mouth daily, Disp: , Rfl:   •  methocarbamol (ROBAXIN) 500 mg tablet, Take 500 mg by mouth 4 (four) times a day, Disp: , Rfl:   •  metoprolol succinate (TOPROL-XL) 50 mg 24 hr tablet, Take 50 mg by mouth daily, Disp: , Rfl:   •  Multiple Vitamin (MULTI-VITAMIN DAILY PO), Take 1 tablet by mouth daily, Disp: , Rfl:   •  rosuvastatin (CRESTOR) 20 MG tablet, TAKE 1 TABLET BY MOUTH EVERY DAY AT NIGHT, Disp: , Rfl:   •  tamsulosin (FLOMAX) 0.4 mg, TAKE 1 CAPSULE BY MOUTH NIGHTLY, Disp: , Rfl:   •  latanoprost (XALATAN) 0.005 % ophthalmic solution, Apply 1 drop to eye, Disp: , Rfl:   •  meloxicam (MOBIC) 15 mg tablet, Take 15 mg by mouth, Disp: , Rfl:   •  nitroglycerin (NITROSTAT) 0.4 mg SL tablet, Place 0.4 mg under the tongue, Disp: , Rfl:     History reviewed. No pertinent past medical history. History reviewed. No pertinent surgical history.     Social History
Health Care Proxy
Surrogate

## 2023-10-23 NOTE — H&P CARDIOLOGY - BP NONINVASIVE DIASTOLIC (MM HG)
Requesting advisement on 10/20 lab results from Dr. Grullon.     Regarding fluticasone, when calling back patient regarding labs, is patient receiving this medication from [facility] pharmacy? Pt's most recent prescription was sent on 8/16/23 with 3 refills.    80

## 2023-11-01 NOTE — H&P ADULT - NSHPPOADEEPVENOUSTHROMB_GEN_A_CORE
no
No. JESICA screening performed.  STOP BANG Legend: 0-2 = LOW Risk; 3-4 = INTERMEDIATE Risk; 5-8 = HIGH Risk

## 2024-02-26 NOTE — PROCEDURE NOTE - NSCVLACTUALSITE_VASC_A_CORE
Problem: Adult Inpatient Plan of Care  Goal: Plan of Care Review  Outcome: Ongoing, Progressing  Goal: Patient-Specific Goal (Individualized)  Outcome: Ongoing, Progressing  Goal: Absence of Hospital-Acquired Illness or Injury  Outcome: Ongoing, Progressing  Goal: Optimal Comfort and Wellbeing  Outcome: Ongoing, Progressing  Goal: Readiness for Transition of Care  Outcome: Ongoing, Progressing     Problem: Pain Acute  Goal: Acceptable Pain Control and Functional Ability  Outcome: Ongoing, Progressing     Problem: Fatigue  Goal: Improved Activity Tolerance  Outcome: Ongoing, Progressing     Problem: Behavioral Health Comorbidity  Goal: Maintenance of Behavioral Health Symptom Control  Outcome: Ongoing, Progressing     Problem: Hypertension Comorbidity  Goal: Blood Pressure in Desired Range  Outcome: Ongoing, Progressing     Problem: Anxiety  Goal: Anxiety Reduction or Resolution  Outcome: Ongoing, Progressing     Problem: Depression  Goal: Improved Mood  Outcome: Ongoing, Progressing      right/internal jugular

## 2024-02-29 NOTE — AIRWAY REMOVAL NOTE  ADULT & PEDS - O2 DELIVERY METHOD
CARDIOLOGY PROGRESS NOTE:    Shruthi Amin  89 y.o.  female  6/10/1926  [id number]      Referring Provider: Hospitalist    Reason for follow-up: Abnormal echo     Patient Care Team:  Cristal Goodwin MD as PCP - General (Family Medicine)  Aleksey Mathew MD as Consulting Physician (Cardiology)  Anai Moise APRN as Nurse Practitioner (Cardiology)    Subjective .  Doing well without any chest pain or shortness of breath but complains of back pain    Objective   sitting in chair comfortable     Review of Systems   Constitutional: Negative for fever and malaise/fatigue.   HENT:  Negative for ear pain and nosebleeds.    Eyes:  Negative for blurred vision and double vision.   Cardiovascular:  Negative for chest pain, dyspnea on exertion and palpitations.   Respiratory:  Negative for cough and shortness of breath.    Skin:  Negative for rash.   Musculoskeletal:  Positive for back pain. Negative for joint pain.   Gastrointestinal:  Negative for abdominal pain, nausea and vomiting.   Neurological:  Negative for focal weakness and headaches.   Psychiatric/Behavioral:  Negative for depression. The patient is not nervous/anxious.    All other systems reviewed and are negative.      Allergies: Penicillins, Codeine, and Latex    Scheduled Meds:budesonide, 0.5 mg, Nebulization, BID - RT  cefdinir, 300 mg, Oral, Q12H  digoxin, 125 mcg, Oral, Daily  docusate sodium, 100 mg, Oral, Nightly  furosemide, 20 mg, Intravenous, Daily  ipratropium-albuterol, 3 mL, Nebulization, 4x Daily - RT  latanoprost, 1 drop, Right Eye, Nightly  Lidocaine, 1 patch, Transdermal, Q24H  metoprolol succinate XL, 25 mg, Oral, BID  sodium chloride, 10 mL, Intravenous, Q12H      Continuous Infusions:Pharmacy to dose warfarin,       PRN Meds:.  acetaminophen    senna-docusate sodium **AND** polyethylene glycol **AND** bisacodyl **AND** bisacodyl    Calcium Replacement - Follow Nurse / BPA Driven Protocol    HYDROcodone-acetaminophen    Magnesium Cardiology  "Dose Replacement - Follow Nurse / BPA Driven Protocol    magnesium hydroxide    ondansetron    Pharmacy to dose warfarin    Phosphorus Replacement - Follow Nurse / BPA Driven Protocol    Potassium Replacement - Follow Nurse / BPA Driven Protocol    [COMPLETED] Insert Peripheral IV **AND** sodium chloride    sodium chloride    sodium chloride        VITAL SIGNS  Vitals:    02/29/24 0629 02/29/24 0630 02/29/24 0634 02/29/24 0814   BP:    108/58   BP Location:    Left arm   Patient Position:    Lying   Pulse: 72 78 71 85   Resp: 16 16 16 15   Temp:    97.9 °F (36.6 °C)   TempSrc:    Oral   SpO2: 98% 98% 96% 97%   Weight:       Height:           Flowsheet Rows      Flowsheet Row First Filed Value   Admission Height 160 cm (63\") Documented at 02/23/2024 1252   Admission Weight 49.4 kg (109 lb) Documented at 02/23/2024 1252             TELEMETRY: Atrial fibrillation with controlled ventricular response    Physical Exam:  Constitutional:       Appearance: Well-developed.   Eyes:      General: No scleral icterus.     Conjunctiva/sclera: Conjunctivae normal.      Pupils: Pupils are equal, round, and reactive to light.   HENT:      Head: Normocephalic and atraumatic.   Neck:      Vascular: No carotid bruit or JVD.   Pulmonary:      Effort: Pulmonary effort is normal.      Breath sounds: Normal breath sounds. No wheezing. No rales.   Cardiovascular:      Normal rate. Regular rhythm.      Murmurs: There is a systolic murmur.   Pulses:     Intact distal pulses.   Abdominal:      General: Bowel sounds are normal.      Palpations: Abdomen is soft.   Musculoskeletal: Normal range of motion.      Cervical back: Normal range of motion and neck supple. Skin:     General: Skin is warm and dry.      Findings: No rash.   Neurological:      Mental Status: Alert.      Comments: No focal deficits          Results Review:   I reviewed the patient's new clinical results.  Lab Results (last 24 hours)       Procedure Component Value Units " Date/Time    Extra Tubes [[id number]] Collected: 02/29/24 0320    Specimen: Blood, Venous Line Updated: 02/29/24 0430    Narrative:      The following orders were created for panel order Extra Tubes.  Procedure                               Abnormality         Status                     ---------                               -----------         ------                     Lavender Top[[id number]]                                     Final result                 Please view results for these tests on the individual orders.    Lavender Top [[id number]] Collected: 02/29/24 0320    Specimen: Blood Updated: 02/29/24 0430     Extra Tube hold for add-on     Comment: Auto resulted       Blood Culture - Blood, Arm, Left [[id number]]  (Normal) Collected: 02/26/24 0412    Specimen: Blood from Arm, Left Updated: 02/29/24 0430     Blood Culture No growth at 3 days    Blood Culture - Blood, Arm, Right [[id number]]  (Normal) Collected: 02/26/24 0407    Specimen: Blood from Arm, Right Updated: 02/29/24 0430     Blood Culture No growth at 3 days    Narrative:      Less than seven (7) mL's of blood was collected.  Insufficient quantity may yield false negative results.    Basic Metabolic Panel [[id number]]  (Abnormal) Collected: 02/29/24 0320    Specimen: Blood Updated: 02/29/24 0422     Glucose 98 mg/dL      BUN 16 mg/dL      Creatinine 0.58 mg/dL      Sodium 131 mmol/L      Potassium 4.9 mmol/L      Chloride 94 mmol/L      CO2 31.0 mmol/L      Calcium 8.9 mg/dL      BUN/Creatinine Ratio 27.6     Anion Gap 6.0 mmol/L      eGFR 83.5 mL/min/1.73     Narrative:      GFR Normal >60  Chronic Kidney Disease <60  Kidney Failure <15    The GFR formula is only valid for adults with stable renal function between ages 18 and 70.    Protime-INR [[id number]]  (Abnormal) Collected: 02/29/24 0320    Specimen: Blood Updated: 02/29/24 0415     Protime 17.2 Seconds      INR 1.64    Blood Culture - Blood, Arm, Left [[id number]]  (Normal) Collected: 02/23/24  1524    Specimen: Blood from Arm, Left Updated: 02/28/24 1545     Blood Culture No growth at 5 days    Narrative:      Less than seven (7) mL's of blood was collected.  Insufficient quantity may yield false negative results.    Blood Culture - Blood, Arm, Right [[id number]]  (Normal) Collected: 02/23/24 1454    Specimen: Blood from Arm, Right Updated: 02/28/24 1515     Blood Culture No growth at 5 days    Narrative:      Less than seven (7) mL's of blood was collected.  Insufficient quantity may yield false negative results.            Imaging Results (Last 24 Hours)       ** No results found for the last 24 hours. **            EKG      I personally viewed and interpreted the patient's EKG/Telemetry data:    ECHOCARDIOGRAM:  Results for orders placed during the hospital encounter of 02/23/24    Adult Transesophageal Echo (JEFF) W/ Cont if Necessary Per Protocol    Interpretation Summary    Left ventricular ejection fraction appears to be 56 - 60%.    Severe mitral valve regurgitation is present.    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    Clinical correlation required       STRESS MYOVIEW:       CARDIAC CATHETERIZATION:  No results found for this or any previous visit.       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  Patient is currently on warfarin which will be held.  Patient had a JEFF which showed a possible healed vegetation versus a fibroblastoma or calcified mass on the aortic valve but also she had severe mitral  regurgitation and hence will be on medical therapy.    CAD  Patient has nonobstructive coronary disease and is ruled out for MI by EKG and enzymes and is currently stable without any angina    Hypertension  Patient blood pressure currently stable on medical therapy including metoprolol    Atrial fibrillation  Patient has history of atrial fibrillation is currently on digoxin and metoprolol and also warfarin and keep his INR on 2-2.5 and will hold it for her JEFF  Patient patient will restart the Coumadin tonight and maintain INR of 2-2.5    History of CVA  Patient is currently stable without any symptoms    Back problems  Patient had back surgery and is followed by the primary care doctor.  Patient is on pain medicines but still has back problems with pain and needs an evaluation done before she goes home  Physical therapy and Occupational Therapy are also working with patient    Severe mitral regurgitation  Patient has severe mitral regurgitation with normal function but because of her age we will do conservative medical therapy only    I discussed the patients findings and my recommendations with patient and nurse    Aleksey Mathew MD  02/29/24  11:45 EST               aerosol mask

## 2024-05-22 NOTE — H&P ADULT - NSHPLABSRESULTS_GEN_ALL_CORE
(4) no impairment .  LABS:                         11.3   16.06 )-----------( 198      ( 03 Nov 2020 16:38 )             44.3     11-03    139  |  98  |  58<H>  ----------------------------<  104<H>  2.9<LL>   |  26  |  2.81<H>    Ca    9.0      03 Nov 2020 16:38  Phos  3.1     11-03  Mg     2.0     11-03    TPro  6.4  /  Alb  3.2<L>  /  TBili  0.3  /  DBili  x   /  AST  13  /  ALT  16  /  AlkPhos  123<H>  11-03    PT/INR - ( 03 Nov 2020 16:38 )   PT: 13.9 SEC;   INR: 1.23          PTT - ( 03 Nov 2020 16:38 )  PTT:41.3 SEC              RADIOLOGY, EKG & ADDITIONAL TESTS: Reviewed.

## 2024-06-21 NOTE — CHART NOTE - NSCHARTNOTEFT_GEN_A_CORE
Nutrition consult received for assessment.  73 yo male with PMH of CAD S/P 12-14 stents (placed [phone]), T2DM, HTN, HLD, COPD, ESRD on dialysis (MWF), bipolar disorder presents from rehab facility for episode of hypotension while at dialysis. Recently discharged 10/18 from [facility] after long hospital stay when initially presented with NSTEMI/pulmonary edema, UTI, VT S/P shock, embolic CVA, new A Fib. Now found with bacteremia x2    Source: Patient [x]    Family [x- nephews at bedside]     other [x- medical record; RN]  Patient's primary language is [facility]. Per medical record, patient is nonverbal at baseline and confused/disoriented. Nephews provided majority of subjective information    Diet : Consistent CHO, Renal, Dysphagia 1 pureed diet with honey thickened liquids  PO intake:   % [x]     Source for PO intake [ ] Patient [x] family [x] chart [x] staff [ ] other  Per medical record, % PO intake x3 meals noted. Seen drinking Nepro (50% consumption thus far) with assist of nephews. Per family, patient is eating well and has a good appetite. Nephews translated and confirmed with patient. Per RN, patient with good PO intake of breakfast this am. Patient doing well with pureed foods and thickened liquids per family. Discussed importance of maintaining good PO intake secondary to increased nutrient needs while on HD. Per family, patient denied nausea/emesis or other GI distress. Per chart, last BM 11/5      Current Weight: Pre-.3 pounds & Post-.1 pounds on 11/4  Weight Change: Variable wt history since admission ranging from 158.7 to 177.2 pounds noted. Weight fluctuations likely secondary to fluid shifts as patient with ESRD on HD    Pertinent Medications: MEDICATIONS  (STANDING):  amiodarone    Tablet 200 milliGRAM(s) Oral daily  artificial  tears Solution 1 Drop(s) Both EYES four times a day  aspirin enteric coated 81 milliGRAM(s) Oral daily  atorvastatin 40 milliGRAM(s) Oral at bedtime  BACItracin   Ointment 1 Application(s) Topical two times a day  busPIRone 5 milliGRAM(s) Oral two times a day  clopidogrel Tablet 75 milliGRAM(s) Oral daily  clotrimazole/betamethasone Cream 1 Application(s) Topical two times a day  dextrose 5%. 1000 milliLiter(s) (50 mL/Hr) IV Continuous <Continuous>  dextrose 50% Injectable 12.5 Gram(s) IV Push once  dextrose 50% Injectable 25 Gram(s) IV Push once  dextrose 50% Injectable 25 Gram(s) IV Push once  epoetin georgie Injectable 94884 Unit(s) IV Push <User Schedule>  fluticasone propionate   110 MICROgram(s) HFA Inhaler 1 Puff(s) Inhalation daily  insulin lispro (HumaLOG) corrective regimen sliding scale   SubCutaneous Before meals and at bedtime  Nephro-sophie 1 Tablet(s) Oral daily  pantoprazole   Suspension 40 milliGRAM(s) Oral before breakfast  tamsulosin 0.4 milliGRAM(s) Oral at bedtime    MEDICATIONS  (PRN):  ALBUTerol    90 MICROgram(s) HFA Inhaler 2 Puff(s) Inhalation every 6 hours PRN Shortness of Breath and/or Wheezing  dextrose Gel 1 Dose(s) Oral once PRN Blood Glucose LESS THAN 70 milliGRAM(s)/deciliter  glucagon  Injectable 1 milliGRAM(s) IntraMuscular once PRN Glucose LESS THAN 70 milligrams/deciliter    Pertinent Labs:  (11/5) BUN 35H, Cr 3.94H, BG 170H, POCT ; (11/4) POCT -231; (11/3) Phos 2.4L- monitor as previously was elevated    Skin: stage III sacral pressure ulcer, no edema noted    Estimated Needs:   [x] no change since previous assessment  [ ] recalculated:       Previous Nutrition Diagnosis:      [x] Increased Nutrient Needs        Nutrition Diagnosis is [x] ongoing, addressed with Nepro supplement         New Nutrition Diagnosis: [x] not applicable         Interventions:     Recommend    1) Continue consistent CHO, renal, dysphagia 1 pureed diet with honey thickened liquids  2) Continue Nepro 1x daily  3) Encouraged continued good PO intake of meals due to increased nutrient needs       Monitoring and Evaluation:     [x] PO intake [x] Tolerance to diet prescription [x] weights [x] follow up per protocol    [x] other: RD remains available, Lou Phillips RD Pager #[phone] Patient/Caregiver provided printed discharge information.

## 2024-09-04 NOTE — PROGRESS NOTE ADULT - PROBLEM SELECTOR PLAN 2
diffuse maculopapular rash, discussed with daughter - rash is new, no new medications at rehab, no new meds given in ED.  Rash improved today - monitor closely, if no improvement would consider derm on Monday 179.8 - uknown etiology, diffuse maculopapular rash now resolved with small area still present around R axilla  - discussed with daughter - rash is new, no new medications at rehab, no new meds given in ED. - unknown etiology, diffuse maculopapular rash now resolved with small area still present around R axilla  - discussed with daughter - rash is new, no new medications at rehab, no new meds given in ED.

## 2024-09-29 NOTE — H&P ADULT - POSTERIOR CERVICAL R
normal
2 mm cavernous aneurysm seen on CTA Case discussed with neurointerventional/endovascular Dr. Bradley if through transfer center advised no clinical indication for transfer at this time can follow-up outpatient with Dr. Varela All results discussed with patient and daughter given copy of results  Reevaluated patient at bedside.  Patient feeling much improved.  Discussed the results of all diagnostic testing in ED and copies of all available reports given.   An opportunity to ask questions was provided.  Discussed the importance of prompt, close medical follow-up.  Patient will return with any changes, concerns or persistent/worsening symptoms.  Understanding of all instructions verbalized.

## 2024-12-27 NOTE — PATIENT PROFILE ADULT - NSPRONUTRITIONRISK_GEN_A_NUR
Nonbillable note : sent script for clonazepam for ok for early fill due to travel plans , ran oarrs , no concerns on oarrs . Reviewed med order history in the [facility] em[facility]    No indicators present

## 2024-12-30 NOTE — CONSULT NOTE ADULT - PROBLEM/RECOMMENDATION-3
DISPLAY PLAN FREE TEXT
Do not remove dressing/bandage  Keep dressing clean and dry   Digital range of motion  Hand elevation     Please contact the office at [phone] if you have any questions or concerns.

## 2025-02-10 NOTE — PHYSICAL THERAPY INITIAL EVALUATION ADULT - LEVEL OF INDEPENDENCE: STAND/SIT, REHAB EVAL
Triage note: Patient arrives via EMS for stridor breath sounds at pediatricians office.  
unable to perform/(+) BEDREST ORDER

## 2025-04-12 NOTE — PROGRESS NOTE ADULT - PROBLEM SELECTOR PROBLEM 2
Pyelonephritis

## 2025-04-14 NOTE — DISCHARGE NOTE ADULT - MEDICATION SUMMARY - MEDICATIONS TO STOP TAKING
Left message for pts dtr, Simin, to call back    I will STOP taking the medications listed below when I get home from the hospital:  None

## 2025-04-18 NOTE — DISCHARGE NOTE ADULT - MEDICATION SUMMARY - MEDICATIONS TO TAKE
Individual Psychotherapy / discharge note 0900 20 Minutes                S-20 min individual psychotherapy provided via HIPAA complaint Zoom telehealth services.  This visit is being performed virtually via Non-integrated Video Visit. Consent to treat includes permission to submit charges to the patient's insurance. It was shared that without being seen and evaluated in person, there is a risk that the information and/or assessment may be incomplete or inaccurate. This video visit may be discontinued by patient or clinician, if it is felt that the videoconferencing connections are not adequate for her situation.   Clinical Location: Home  Cammie's location [facility] and is physically present in   the [facility] at the time of this visit.   Writer met with the patient in order to review plan for discharge to outpatient providers.  The pt reported her plan is to follow up with her psychiatrist Aj Tinoco DO [phone], but has yet to make a follow up appointment.  The pt also will need to make an appointment with Anusha Loco LCSW [phone].  The pt plans on following up with [facility] on Monday, 4/21/2025.  The pt reported she is struggling with some concerns regarding transportation as well as legal concerns.  The pt indicated a score of 21 on her discharge PHQ-9, and a score of 16 on her ERIKA-7.  Pt's C-SSRS was yellow, history only.  Please refer to pt's discharge paperwork located in pt's chart for more information.            Therapeutic interventions of processing the pt's feelings and thoughts regarding the current stressors in her life, psychological education regarding dx to normalize the pt's experience, reframe cognitive distortion.  Writer offered support, empathy, and encouragement.            O-OrientedX4, Hygiene-good, Mood-less depressed, less anxious, eye contact-good, speech-normal rate at times and rhythm, thought  process-clear & logical, no psychosis/delusions/paranoia reported, pt denies passive suicide ideation, denied plan or intent.   A-Axis I: F33.1 Major Depressive Disorder, moderate, recurrent without psychotic features, F41.1 Generalized Anxiety Disorder, F10.20 Alcohol Use Disorder,F12.20 Cannabis Use Disorder   Axis II: Z03.89 No diagnosis   Axis III: None  Axis IV: severe: poor coping skills, occupational, nutrition, environment   Axis V: Current: 52 Past Year:72             P-Pt will continue to utilize cognitive behavioral strategies to increase self-confidence, improve mood, decrease depressive sxs, and decrease anxiety sxs.  She is discharged as of today; please refer to d/c paperwork and pt's AVS for additional information.  Pt is amenable to plan of care.   I will START or STAY ON the medications listed below when I get home from the hospital:    aspirin 81 mg oral tablet, chewable  -- 1 tab(s) by mouth once a day  -- Indication: For Coronary atherosclerosis    Flomax 0.4 mg oral capsule  -- 1 cap(s) by mouth once a day  -- Indication: For urinary retention    isosorbide mononitrate 30 mg oral tablet, extended release  -- 1 tab(s) by mouth once a day  -- Indication: For Chest pain and HTN    heparin  -- Indication: For DVT PPx    insulin lispro 100 units/mL subcutaneous solution  --  subcutaneous every 6 hours; 2 Unit(s) if Glucose 151 - 200  4 Unit(s) if Glucose 201 - 250  6 Unit(s) if Glucose 251 - 300  8 Unit(s) if Glucose 301 - 350  10 Unit(s) if Glucose 351 - 400  12 Unit(s) if Glucose GREATER THAN 400  -- Indication: For DM (diabetes mellitus)    Lipitor 80 mg oral tablet  -- 1 tab(s) by mouth once a day  -- Indication: For HLD (hyperlipidemia)    Plavix 75 mg oral tablet  -- 1 tab(s) by mouth once a day  -- Indication: For Coronary atherosclerosis    metoprolol tartrate 50 mg oral tablet  -- 1 tab(s) by mouth every 8 hours  -- Indication: For Coronary atherosclerosis    budesonide-formoterol 80 mcg-4.5 mcg/inh inhalation aerosol  --  inhaled   -- Indication: For COPD (chronic obstructive pulmonary disease)    NIFEdipine 60 mg oral tablet, extended release  -- 1 tab(s) by mouth once a day  -- Indication: For HTN (hypertension)    sevelamer hydrochloride 800 mg oral tablet  -- 2 tab(s) by mouth 3 times a day (with meals)  -- Indication: For Hyperphospatemia    pantoprazole 40 mg intravenous injection  -- 40 milligram(s) intravenous once a day  -- Indication: For GI PPx    Vitamin D3 50,000 intl units oral capsule  -- 1 cap(s) by mouth once a month  -- Indication: For vitamin D deficiency